# Patient Record
Sex: MALE | Race: BLACK OR AFRICAN AMERICAN | NOT HISPANIC OR LATINO | Employment: OTHER | URBAN - METROPOLITAN AREA
[De-identification: names, ages, dates, MRNs, and addresses within clinical notes are randomized per-mention and may not be internally consistent; named-entity substitution may affect disease eponyms.]

---

## 2018-08-31 LAB
BUN SERPL-MCNC: 9 MG/DL (ref 8–20)
CALCIUM SERPL-MCNC: 8.3 MG/DL (ref 7.7–10.4)
CHLORIDE: 99 MMOL/L (ref 98–110)
CO2 SERPL-SCNC: 26.8 MMOL/L (ref 22.8–31.6)
CREATININE: 0.87 MG/DL (ref 0.6–1.4)
GLUCOSE: 108 MG/DL (ref 70–99)
INR PPP: 1.6
POTASSIUM SERPL-SCNC: 3.5 MMOL/L (ref 3.5–5)
PROTHROMBIN TIME: 18.9 SEC (ref 11.3–15.2)
SODIUM: 134 MMOL/L (ref 134–144)

## 2018-09-02 LAB
BASOPHILS NFR BLD: 0 K/UL (ref 0–0.2)
BASOPHILS NFR BLD: 0.4 %
BUN SERPL-MCNC: 8 MG/DL (ref 8–20)
CALCIUM SERPL-MCNC: 8.5 MG/DL (ref 7.7–10.4)
CHLORIDE: 102 MMOL/L (ref 98–110)
CO2 SERPL-SCNC: 25.3 MMOL/L (ref 22.8–31.6)
CREATININE: 0.91 MG/DL (ref 0.6–1.4)
EOSINOPHIL NFR BLD: 0.1 K/UL (ref 0–0.7)
EOSINOPHIL NFR BLD: 1.6 %
ERYTHROCYTE [DISTWIDTH] IN BLOOD BY AUTOMATED COUNT: 14.1 % (ref 11.7–14.9)
GLUCOSE: 99 MG/DL (ref 70–99)
GRAN #: 4.6 K/UL (ref 1.4–6.5)
GRAN%: 60.5 %
HCT VFR BLD AUTO: 29.9 % (ref 39–55)
HGB BLD-MCNC: 10.3 G/DL (ref 14–16)
IMMATURE GRANS (ABS): 0 K/UL (ref 0–1)
IMMATURE GRANULOCYTES: 0.1 %
INR PPP: 1.8
LYMPH #: 2 K/UL (ref 1.2–3.4)
LYMPH%: 25.5 %
MCH RBC QN AUTO: 37.9 PG (ref 25–35)
MCHC RBC AUTO-ENTMCNC: 34.4 G/DL (ref 31–36)
MCV RBC AUTO: 109.9 FL (ref 80–100)
MONO #: 0.9 K/UL (ref 0.1–0.6)
MONO%: 11.9 %
NUCLEATED RBCS: 0 %
PLATELET # BLD AUTO: 83 K/UL (ref 140–440)
PMV BLD AUTO: 11.3 FL (ref 8.8–12.7)
POTASSIUM SERPL-SCNC: 3.5 MMOL/L (ref 3.5–5)
PROTHROMBIN TIME: 20.7 SEC (ref 11.3–15.2)
RBC # BLD AUTO: 2.72 M/UL (ref 4.3–5.9)
SODIUM: 135 MMOL/L (ref 134–144)
WBC # BLD AUTO: 7.7 K/UL (ref 5–10)

## 2019-05-11 ENCOUNTER — HOSPITAL ENCOUNTER (INPATIENT)
Facility: OTHER | Age: 44
LOS: 2 days | Discharge: HOME | DRG: 682 | End: 2019-05-13
Attending: EMERGENCY MEDICINE | Admitting: HOSPITALIST
Payer: MEDICAID

## 2019-05-11 DIAGNOSIS — N17.9 AKI (ACUTE KIDNEY INJURY): Primary | ICD-10-CM

## 2019-05-11 DIAGNOSIS — R53.1 WEAKNESS: ICD-10-CM

## 2019-05-11 DIAGNOSIS — K70.30 ALCOHOLIC CIRRHOSIS OF LIVER WITHOUT ASCITES: ICD-10-CM

## 2019-05-11 DIAGNOSIS — S72.002A CLOSED FRACTURE OF LEFT HIP, INITIAL ENCOUNTER: ICD-10-CM

## 2019-05-11 PROBLEM — D64.9 ANEMIA: Status: ACTIVE | Noted: 2019-05-11

## 2019-05-11 PROBLEM — E87.6 HYPOKALEMIA: Status: ACTIVE | Noted: 2019-05-11

## 2019-05-11 PROBLEM — N17.0 ACUTE RENAL FAILURE WITH TUBULAR NECROSIS: Status: ACTIVE | Noted: 2019-05-11

## 2019-05-11 PROBLEM — M87.00 AVN (AVASCULAR NECROSIS OF BONE): Status: ACTIVE | Noted: 2019-05-11

## 2019-05-11 PROBLEM — Z86.711 HISTORY OF PULMONARY EMBOLUS (PE): Status: ACTIVE | Noted: 2019-05-11

## 2019-05-11 PROBLEM — I85.00 ESOPHAGEAL VARICES WITHOUT BLEEDING: Status: ACTIVE | Noted: 2019-05-11

## 2019-05-11 PROBLEM — D69.6 THROMBOCYTOPENIA: Status: ACTIVE | Noted: 2019-05-11

## 2019-05-11 PROBLEM — D68.9 COAGULOPATHY: Status: ACTIVE | Noted: 2019-05-11

## 2019-05-11 LAB
ALBUMIN SERPL BCP-MCNC: 3.3 G/DL (ref 3.5–5.2)
ALBUMIN SERPL BCP-MCNC: 3.4 G/DL (ref 3.5–5.2)
ALP SERPL-CCNC: 78 U/L (ref 55–135)
ALP SERPL-CCNC: 86 U/L (ref 55–135)
ALT SERPL W/O P-5'-P-CCNC: 22 U/L (ref 10–44)
ALT SERPL W/O P-5'-P-CCNC: 24 U/L (ref 10–44)
AMMONIA PLAS-SCNC: 46 UMOL/L (ref 10–50)
AMPHET+METHAMPHET UR QL: NEGATIVE
ANION GAP SERPL CALC-SCNC: 11 MMOL/L (ref 8–16)
ANION GAP SERPL CALC-SCNC: 15 MMOL/L (ref 8–16)
APTT BLDCRRT: 31.7 SEC (ref 21–32)
AST SERPL-CCNC: 39 U/L (ref 10–40)
AST SERPL-CCNC: 42 U/L (ref 10–40)
BACTERIA #/AREA URNS HPF: ABNORMAL /HPF
BARBITURATES UR QL SCN>200 NG/ML: NEGATIVE
BASOPHILS # BLD AUTO: 0.02 K/UL (ref 0–0.2)
BASOPHILS # BLD AUTO: 0.03 K/UL (ref 0–0.2)
BASOPHILS NFR BLD: 0.2 % (ref 0–1.9)
BASOPHILS NFR BLD: 0.3 % (ref 0–1.9)
BENZODIAZ UR QL SCN>200 NG/ML: NEGATIVE
BILIRUB SERPL-MCNC: 2.7 MG/DL (ref 0.1–1)
BILIRUB SERPL-MCNC: 2.9 MG/DL (ref 0.1–1)
BILIRUB UR QL STRIP: NEGATIVE
BNP SERPL-MCNC: 13 PG/ML (ref 0–99)
BUN SERPL-MCNC: 35 MG/DL (ref 6–20)
BUN SERPL-MCNC: 38 MG/DL (ref 6–20)
BZE UR QL SCN: NEGATIVE
CALCIUM SERPL-MCNC: 8.4 MG/DL (ref 8.7–10.5)
CALCIUM SERPL-MCNC: 8.6 MG/DL (ref 8.7–10.5)
CANNABINOIDS UR QL SCN: NORMAL
CHLORIDE SERPL-SCNC: 100 MMOL/L (ref 95–110)
CHLORIDE SERPL-SCNC: 95 MMOL/L (ref 95–110)
CK SERPL-CCNC: 236 U/L (ref 20–200)
CLARITY UR: ABNORMAL
CO2 SERPL-SCNC: 23 MMOL/L (ref 23–29)
CO2 SERPL-SCNC: 24 MMOL/L (ref 23–29)
COLOR UR: YELLOW
CREAT SERPL-MCNC: 5 MG/DL (ref 0.5–1.4)
CREAT SERPL-MCNC: 6.1 MG/DL (ref 0.5–1.4)
CREAT UR-MCNC: 38.7 MG/DL (ref 23–375)
CREAT UR-MCNC: 39 MG/DL (ref 23–375)
DIFFERENTIAL METHOD: ABNORMAL
DIFFERENTIAL METHOD: ABNORMAL
EOSINOPHIL # BLD AUTO: 0.1 K/UL (ref 0–0.5)
EOSINOPHIL # BLD AUTO: 0.1 K/UL (ref 0–0.5)
EOSINOPHIL NFR BLD: 0.5 % (ref 0–8)
EOSINOPHIL NFR BLD: 0.6 % (ref 0–8)
EOSINOPHIL URNS QL WRIGHT STN: NORMAL
ERYTHROCYTE [DISTWIDTH] IN BLOOD BY AUTOMATED COUNT: 14 % (ref 11.5–14.5)
ERYTHROCYTE [DISTWIDTH] IN BLOOD BY AUTOMATED COUNT: 14.3 % (ref 11.5–14.5)
ERYTHROCYTE [SEDIMENTATION RATE] IN BLOOD: 60 MM/HR (ref 0–10)
EST. GFR  (AFRICAN AMERICAN): 12 ML/MIN/1.73 M^2
EST. GFR  (AFRICAN AMERICAN): 15 ML/MIN/1.73 M^2
EST. GFR  (NON AFRICAN AMERICAN): 10 ML/MIN/1.73 M^2
EST. GFR  (NON AFRICAN AMERICAN): 13 ML/MIN/1.73 M^2
ETHANOL UR-MCNC: <10 MG/DL
GLUCOSE SERPL-MCNC: 118 MG/DL (ref 70–110)
GLUCOSE SERPL-MCNC: 94 MG/DL (ref 70–110)
GLUCOSE UR QL STRIP: NEGATIVE
HCT VFR BLD AUTO: 28 % (ref 40–54)
HCT VFR BLD AUTO: 29.3 % (ref 40–54)
HGB BLD-MCNC: 9.3 G/DL (ref 14–18)
HGB BLD-MCNC: 9.7 G/DL (ref 14–18)
HGB UR QL STRIP: ABNORMAL
HYALINE CASTS #/AREA URNS LPF: 5 /LPF
INR PPP: 1.3 (ref 0.8–1.2)
KETONES UR QL STRIP: NEGATIVE
LACTATE SERPL-SCNC: 2.3 MMOL/L (ref 0.5–2.2)
LACTATE SERPL-SCNC: 3.2 MMOL/L (ref 0.5–2.2)
LEUKOCYTE ESTERASE UR QL STRIP: NEGATIVE
LIPASE SERPL-CCNC: 124 U/L (ref 4–60)
LYMPHOCYTES # BLD AUTO: 1.9 K/UL (ref 1–4.8)
LYMPHOCYTES # BLD AUTO: 2.7 K/UL (ref 1–4.8)
LYMPHOCYTES NFR BLD: 21.4 % (ref 18–48)
LYMPHOCYTES NFR BLD: 28.8 % (ref 18–48)
MAGNESIUM SERPL-MCNC: 2 MG/DL (ref 1.6–2.6)
MAGNESIUM SERPL-MCNC: 2.3 MG/DL (ref 1.6–2.6)
MCH RBC QN AUTO: 32.9 PG (ref 27–31)
MCH RBC QN AUTO: 33.1 PG (ref 27–31)
MCHC RBC AUTO-ENTMCNC: 33.1 G/DL (ref 32–36)
MCHC RBC AUTO-ENTMCNC: 33.2 G/DL (ref 32–36)
MCV RBC AUTO: 100 FL (ref 82–98)
MCV RBC AUTO: 99 FL (ref 82–98)
METHADONE UR QL SCN>300 NG/ML: NEGATIVE
MICROSCOPIC COMMENT: ABNORMAL
MONOCYTES # BLD AUTO: 0.9 K/UL (ref 0.3–1)
MONOCYTES # BLD AUTO: 1.1 K/UL (ref 0.3–1)
MONOCYTES NFR BLD: 12.2 % (ref 4–15)
MONOCYTES NFR BLD: 9.7 % (ref 4–15)
NEUTROPHILS # BLD AUTO: 5.6 K/UL (ref 1.8–7.7)
NEUTROPHILS # BLD AUTO: 5.8 K/UL (ref 1.8–7.7)
NEUTROPHILS NFR BLD: 60.5 % (ref 38–73)
NEUTROPHILS NFR BLD: 65.4 % (ref 38–73)
NITRITE UR QL STRIP: NEGATIVE
OPIATES UR QL SCN: NEGATIVE
PCP UR QL SCN>25 NG/ML: NEGATIVE
PH UR STRIP: 6 [PH] (ref 5–8)
PHOSPHATE SERPL-MCNC: 4.6 MG/DL (ref 2.7–4.5)
PHOSPHATE SERPL-MCNC: 5.4 MG/DL (ref 2.7–4.5)
PLATELET # BLD AUTO: 129 K/UL (ref 150–350)
PLATELET # BLD AUTO: 140 K/UL (ref 150–350)
PMV BLD AUTO: 10.2 FL (ref 9.2–12.9)
PMV BLD AUTO: 10.8 FL (ref 9.2–12.9)
POTASSIUM SERPL-SCNC: 3.3 MMOL/L (ref 3.5–5.1)
POTASSIUM SERPL-SCNC: 3.3 MMOL/L (ref 3.5–5.1)
PROCALCITONIN SERPL IA-MCNC: 0.67 NG/ML
PROT SERPL-MCNC: 8.2 G/DL (ref 6–8.4)
PROT SERPL-MCNC: 8.2 G/DL (ref 6–8.4)
PROT UR QL STRIP: ABNORMAL
PROT UR-MCNC: 8 MG/DL (ref 0–15)
PROT/CREAT UR: 0.21 MG/G{CREAT} (ref 0–0.2)
PROTHROMBIN TIME: 14.5 SEC (ref 9–12.5)
RBC # BLD AUTO: 2.81 M/UL (ref 4.6–6.2)
RBC # BLD AUTO: 2.95 M/UL (ref 4.6–6.2)
RBC #/AREA URNS HPF: 2 /HPF (ref 0–4)
SODIUM SERPL-SCNC: 133 MMOL/L (ref 136–145)
SODIUM SERPL-SCNC: 135 MMOL/L (ref 136–145)
SODIUM UR-SCNC: 51 MMOL/L (ref 20–250)
SP GR UR STRIP: <=1.005 (ref 1–1.03)
SQUAMOUS #/AREA URNS HPF: 1 /HPF
TOXICOLOGY INFORMATION: NORMAL
TROPONIN I SERPL DL<=0.01 NG/ML-MCNC: 0.06 NG/ML (ref 0–0.03)
URATE UR-MCNC: <5 MG/DL
URN SPEC COLLECT METH UR: ABNORMAL
UROBILINOGEN UR STRIP-ACNC: 1 EU/DL
WBC # BLD AUTO: 8.79 K/UL (ref 3.9–12.7)
WBC # BLD AUTO: 9.34 K/UL (ref 3.9–12.7)
WBC #/AREA URNS HPF: 3 /HPF (ref 0–5)

## 2019-05-11 PROCEDURE — 99223 PR INITIAL HOSPITAL CARE,LEVL III: ICD-10-PCS | Mod: ,,, | Performed by: HOSPITALIST

## 2019-05-11 PROCEDURE — 83605 ASSAY OF LACTIC ACID: CPT | Mod: 91

## 2019-05-11 PROCEDURE — 96360 HYDRATION IV INFUSION INIT: CPT

## 2019-05-11 PROCEDURE — 84560 ASSAY OF URINE/URIC ACID: CPT

## 2019-05-11 PROCEDURE — 81000 URINALYSIS NONAUTO W/SCOPE: CPT

## 2019-05-11 PROCEDURE — 25000003 PHARM REV CODE 250: Performed by: EMERGENCY MEDICINE

## 2019-05-11 PROCEDURE — 85610 PROTHROMBIN TIME: CPT

## 2019-05-11 PROCEDURE — 84145 PROCALCITONIN (PCT): CPT

## 2019-05-11 PROCEDURE — 85025 COMPLETE CBC W/AUTO DIFF WBC: CPT

## 2019-05-11 PROCEDURE — 80053 COMPREHEN METABOLIC PANEL: CPT | Mod: 91

## 2019-05-11 PROCEDURE — 80074 ACUTE HEPATITIS PANEL: CPT

## 2019-05-11 PROCEDURE — 87205 SMEAR GRAM STAIN: CPT

## 2019-05-11 PROCEDURE — 82550 ASSAY OF CK (CPK): CPT

## 2019-05-11 PROCEDURE — 36415 COLL VENOUS BLD VENIPUNCTURE: CPT

## 2019-05-11 PROCEDURE — 80307 DRUG TEST PRSMV CHEM ANLYZR: CPT

## 2019-05-11 PROCEDURE — 99223 1ST HOSP IP/OBS HIGH 75: CPT | Mod: ,,, | Performed by: HOSPITALIST

## 2019-05-11 PROCEDURE — 83735 ASSAY OF MAGNESIUM: CPT | Mod: 91

## 2019-05-11 PROCEDURE — 25000003 PHARM REV CODE 250: Performed by: HOSPITALIST

## 2019-05-11 PROCEDURE — 82140 ASSAY OF AMMONIA: CPT

## 2019-05-11 PROCEDURE — 11000001 HC ACUTE MED/SURG PRIVATE ROOM

## 2019-05-11 PROCEDURE — 83880 ASSAY OF NATRIURETIC PEPTIDE: CPT

## 2019-05-11 PROCEDURE — 83690 ASSAY OF LIPASE: CPT

## 2019-05-11 PROCEDURE — 84100 ASSAY OF PHOSPHORUS: CPT

## 2019-05-11 PROCEDURE — 85730 THROMBOPLASTIN TIME PARTIAL: CPT

## 2019-05-11 PROCEDURE — 84484 ASSAY OF TROPONIN QUANT: CPT

## 2019-05-11 PROCEDURE — 85651 RBC SED RATE NONAUTOMATED: CPT

## 2019-05-11 PROCEDURE — 84300 ASSAY OF URINE SODIUM: CPT

## 2019-05-11 PROCEDURE — 87040 BLOOD CULTURE FOR BACTERIA: CPT

## 2019-05-11 PROCEDURE — 99291 CRITICAL CARE FIRST HOUR: CPT | Mod: 25

## 2019-05-11 PROCEDURE — 82570 ASSAY OF URINE CREATININE: CPT

## 2019-05-11 RX ORDER — GLUCAGON 1 MG
1 KIT INJECTION
Status: DISCONTINUED | OUTPATIENT
Start: 2019-05-11 | End: 2019-05-13 | Stop reason: HOSPADM

## 2019-05-11 RX ORDER — POTASSIUM CHLORIDE 20 MEQ/1
40 TABLET, EXTENDED RELEASE ORAL ONCE
Status: COMPLETED | OUTPATIENT
Start: 2019-05-11 | End: 2019-05-11

## 2019-05-11 RX ORDER — ACETAMINOPHEN 325 MG/1
650 TABLET ORAL
Status: COMPLETED | OUTPATIENT
Start: 2019-05-11 | End: 2019-05-11

## 2019-05-11 RX ORDER — RAMELTEON 8 MG/1
8 TABLET ORAL NIGHTLY PRN
Status: DISCONTINUED | OUTPATIENT
Start: 2019-05-11 | End: 2019-05-11

## 2019-05-11 RX ORDER — ONDANSETRON 8 MG/1
8 TABLET, ORALLY DISINTEGRATING ORAL EVERY 8 HOURS PRN
Status: DISCONTINUED | OUTPATIENT
Start: 2019-05-11 | End: 2019-05-12

## 2019-05-11 RX ORDER — SODIUM CHLORIDE 0.9 % (FLUSH) 0.9 %
10 SYRINGE (ML) INJECTION
Status: DISCONTINUED | OUTPATIENT
Start: 2019-05-11 | End: 2019-05-13 | Stop reason: HOSPADM

## 2019-05-11 RX ORDER — CARVEDILOL 6.25 MG/1
TABLET ORAL
Refills: 3 | Status: ON HOLD | COMMUNITY
Start: 2019-05-10 | End: 2019-05-13 | Stop reason: HOSPADM

## 2019-05-11 RX ORDER — IBUPROFEN 200 MG
16 TABLET ORAL
Status: DISCONTINUED | OUTPATIENT
Start: 2019-05-11 | End: 2019-05-13 | Stop reason: HOSPADM

## 2019-05-11 RX ORDER — SODIUM CHLORIDE 0.9 % (FLUSH) 0.9 %
5 SYRINGE (ML) INJECTION
Status: DISCONTINUED | OUTPATIENT
Start: 2019-05-11 | End: 2019-05-13 | Stop reason: HOSPADM

## 2019-05-11 RX ORDER — HEPARIN SODIUM 5000 [USP'U]/ML
5000 INJECTION, SOLUTION INTRAVENOUS; SUBCUTANEOUS EVERY 8 HOURS
Status: DISCONTINUED | OUTPATIENT
Start: 2019-05-11 | End: 2019-05-11

## 2019-05-11 RX ORDER — IBUPROFEN 200 MG
24 TABLET ORAL
Status: DISCONTINUED | OUTPATIENT
Start: 2019-05-11 | End: 2019-05-13 | Stop reason: HOSPADM

## 2019-05-11 RX ORDER — AMOXICILLIN 250 MG
1 CAPSULE ORAL 2 TIMES DAILY PRN
Status: DISCONTINUED | OUTPATIENT
Start: 2019-05-11 | End: 2019-05-13 | Stop reason: HOSPADM

## 2019-05-11 RX ORDER — SODIUM CHLORIDE, SODIUM LACTATE, POTASSIUM CHLORIDE, CALCIUM CHLORIDE 600; 310; 30; 20 MG/100ML; MG/100ML; MG/100ML; MG/100ML
INJECTION, SOLUTION INTRAVENOUS CONTINUOUS
Status: DISCONTINUED | OUTPATIENT
Start: 2019-05-11 | End: 2019-05-12

## 2019-05-11 RX ORDER — SODIUM CHLORIDE, SODIUM LACTATE, POTASSIUM CHLORIDE, CALCIUM CHLORIDE 600; 310; 30; 20 MG/100ML; MG/100ML; MG/100ML; MG/100ML
1000 INJECTION, SOLUTION INTRAVENOUS
Status: COMPLETED | OUTPATIENT
Start: 2019-05-11 | End: 2019-05-11

## 2019-05-11 RX ORDER — ACETAMINOPHEN 325 MG/1
650 TABLET ORAL EVERY 8 HOURS PRN
Status: DISCONTINUED | OUTPATIENT
Start: 2019-05-11 | End: 2019-05-13 | Stop reason: HOSPADM

## 2019-05-11 RX ORDER — POLYETHYLENE GLYCOL 3350 17 G/17G
17 POWDER, FOR SOLUTION ORAL DAILY PRN
Status: DISCONTINUED | OUTPATIENT
Start: 2019-05-11 | End: 2019-05-13 | Stop reason: HOSPADM

## 2019-05-11 RX ORDER — METHOCARBAMOL 500 MG/1
TABLET, FILM COATED ORAL
Refills: 0 | Status: ON HOLD | COMMUNITY
Start: 2019-05-06 | End: 2019-05-13 | Stop reason: HOSPADM

## 2019-05-11 RX ADMIN — ONDANSETRON 8 MG: 8 TABLET, ORALLY DISINTEGRATING ORAL at 11:05

## 2019-05-11 RX ADMIN — POTASSIUM CHLORIDE 40 MEQ: 1500 TABLET, EXTENDED RELEASE ORAL at 04:05

## 2019-05-11 RX ADMIN — SODIUM CHLORIDE 1721 ML: 0.9 INJECTION, SOLUTION INTRAVENOUS at 02:05

## 2019-05-11 RX ADMIN — SODIUM CHLORIDE, SODIUM LACTATE, POTASSIUM CHLORIDE, AND CALCIUM CHLORIDE: .6; .31; .03; .02 INJECTION, SOLUTION INTRAVENOUS at 08:05

## 2019-05-11 RX ADMIN — SODIUM CHLORIDE, SODIUM LACTATE, POTASSIUM CHLORIDE, AND CALCIUM CHLORIDE 1000 ML: .6; .31; .03; .02 INJECTION, SOLUTION INTRAVENOUS at 04:05

## 2019-05-11 RX ADMIN — ACETAMINOPHEN 650 MG: 325 TABLET, FILM COATED ORAL at 03:05

## 2019-05-11 NOTE — HPI
Mr. Irvin Diaz is a 44yo male with alcoholic cirrhosis, complicated by esophageal varices s/p banding for hematemesis, thrombocytopenia, and coagulopathy, and history of pulmonary embolism (diagnosed Oct 2018, not on anticoagulation because of high risk for bleeding), who presents to the emergency department after he had 2 syncopal episodes at work.  He mentions that he started to feel poorly on 5/8 with some nausea without hematemesis.  He underwent an EGD 5/9 to evaluate his varices, which he was told were stable.  Since then, he has continued to feel nauseous with weakness and decreased p.o. intake.  He went to work the day of admission, and had a syncopal episode, and then had another.  He denies any alcohol intake the last few weeks.  He denies any chest pain or shortness of breath or LE swelling.  He claims to be compliant all of his medications.   He claims that he is not on any anticoagulation because of his varices with hematemesis.  Of note, he recently had a fall with his dog, landing on his left hip.  Since then, he has been ambulating with a limp.  He was told at OSH that he had a fracture.    In the emergency department, he was found to have a blood pressure of 93/53 on arrival, but was reported by EMS to have a systolic blood pressure in the 60s.  Labs were notable for a creatinine of 6.1 up from a baseline of 1.  He was given IV fluids with improvement in his creatinine down to 5 while in the emergency department.  Nephrology was consulted.  He was admitted to Hospital Medicine for further management.

## 2019-05-11 NOTE — ASSESSMENT & PLAN NOTE
· Diagnosed with PE Oct 2018 and was started on Warfarin for increased bleeding risk because of liver disease and varices  · Hasn't been on treatment  · Check LE US, VQ scan  · Would benefit from repeat CTA outpatient to monitor PE progression  · Likely to need IVC filter since not a good candidate for anticoagulation  · Will need to discuss risk/benefit with patient but will start Heparin TID for prophylaxis for now until further evaluated.  If positive, will need Heparin gtt in the meantime

## 2019-05-11 NOTE — ASSESSMENT & PLAN NOTE
· Hgb 9.7, around baseline  · Likely to drop with IVF  · Monitor  · Check type and screen, iron studies

## 2019-05-11 NOTE — ASSESSMENT & PLAN NOTE
MELD-Na score: 27 at 5/11/2019  3:41 PM  MELD score: 26 at 5/11/2019  3:41 PM  Calculated from:  Serum Creatinine: 5.0 mg/dL (Rounded to 4 mg/dL) at 5/11/2019  3:41 PM  Serum Sodium: 135 mmol/L at 5/11/2019  3:41 PM  Total Bilirubin: 2.7 mg/dL at 5/11/2019  3:41 PM  INR(ratio): 1.3 at 5/11/2019  1:46 PM  Age: 43 years  · Worsening MELD 2/2 renal failure  · IVF as above  · Check Hep panel  · Will need referral to Hepatology outpatient

## 2019-05-11 NOTE — ED TRIAGE NOTES
Pt presents to ED via EMS with c/o hypotension, chronic hip pain and alleged hallucinations per EMS. Pt reportedly 60s/30s on scene and describing visual hallucinations, received 1 L NS from EMS. Pt reports compliance with BP medications last taken this morning, but pt is poor historian of medications and is unable to give names or indications of medications. Pt semi-Fowlers in stretcher, drowsy but easily arousable, AAOx4, cooperative, responding appropriately to questions, speaking in full sentences, respirations even and unlabored, skin warm and dry with color appropriate for ethnicity.

## 2019-05-11 NOTE — H&P
[facility] Hospital Medicine  History & Physical    Patient Name: Irvin Diaz  MRN: [id number]  Admission Date: 5/11/2019  Attending Physician: Mila Kinney MD   Primary Care Provider: Primary Doctor No         Patient information was obtained from patient and ER records.     Subjective:     Principal Problem:Acute renal failure with tubular necrosis    Chief Complaint:   Chief Complaint   Patient presents with    Hypotension     Pt with hypotension (initial BP 60/30's), hip pain and alledged hallucinations (pt denies), denies trauma, received 1l NS per EMS        HPI: Mr. Irvin Diaz is a 42yo male with alcoholic cirrhosis, complicated by esophageal varices s/p banding for hematemesis, thrombocytopenia, and coagulopathy, and history of pulmonary embolism (diagnosed Oct 2018, not on anticoagulation because of high risk for bleeding), who presents to the emergency department after he had 2 syncopal episodes at work.  He mentions that he started to feel poorly on 5/8 with some nausea without hematemesis.  He underwent an EGD 5/9 to evaluate his varices, which he was told were stable.  Since then, he has continued to feel nauseous with weakness and decreased p.o. intake.  He went to work the day of admission, and had a syncopal episode, and then had another.  He denies any alcohol intake the last few weeks.  He denies any chest pain or shortness of breath or LE swelling.  He claims to be compliant all of his medications.   He claims that he is not on any anticoagulation because of his varices with hematemesis.  Of note, he recently had a fall with his dog, landing on his left hip.  Since then, he has been ambulating with a limp.  He was told at OSH that he had a fracture.    In the emergency department, he was found to have a blood pressure of 93/53 on arrival, but was reported by EMS to have a systolic blood pressure in the 60s.  Labs were notable for a creatinine of 6.1 up from a  baseline of 1.  He was given IV fluids with improvement in his creatinine down to 5 while in the emergency department.  Nephrology was consulted.  He was admitted to Hospital Medicine for further management.    Past Medical History:   Diagnosis Date    Alcoholic cirrhosis of liver     Hypertension        Past Surgical History:   Procedure Laterality Date    COLON SURGERY      HERNIA REPAIR         Review of patient's allergies indicates:  No Known Allergies    No current facility-administered medications on file prior to encounter.      Current Outpatient Medications on File Prior to Encounter   Medication Sig    carvedilol (COREG) 6.25 MG tablet TK 1 T PO  BID WITH MEALS    lisinopril-hydrochlorothiazide (PRINZIDE,ZESTORETIC) 20-25 mg Tab Take 1 tablet by mouth once daily.    cloNIDine (CATAPRES) 0.1 MG tablet Take 1 tablet (0.1 mg total) by mouth 2 (two) times daily.    methocarbamol (ROBAXIN) 500 MG Tab TAKE 1 TABLET BY MOUTH THREE TIMES A DAY AS NEEDED FOR MUSCLE SPASMS     Family History     None        Tobacco Use    Smoking status: Never Smoker   Substance and Sexual Activity    Alcohol use: Yes     Comment: freq    Drug use: Yes     Types: Marijuana    Sexual activity: Not on file     Comment: occ     Review of Systems   Constitutional: Positive for fatigue. Negative for chills and fever.   HENT: Negative for sore throat and trouble swallowing.    Eyes: Negative for photophobia and visual disturbance.   Respiratory: Negative for cough and shortness of breath.    Cardiovascular: Negative for chest pain, palpitations and leg swelling.   Gastrointestinal: Positive for nausea. Negative for abdominal pain, constipation, diarrhea and vomiting.   Endocrine: Negative for cold intolerance and heat intolerance.   Genitourinary: Negative for dysuria and frequency.   Musculoskeletal: Negative for arthralgias and myalgias.   Skin: Negative for rash and wound.   Neurological: Positive for syncope, weakness and  light-headedness. Negative for dizziness.   Psychiatric/Behavioral: Negative for confusion and hallucinations.   All other systems reviewed and are negative.    Objective:     Vital Signs (Most Recent):  Temp: 98.1 °F (36.7 °C) (05/11/19 1442)  Pulse: 84 (05/11/19 1652)  Resp: 16 (05/11/19 1652)  BP: 128/63 (05/11/19 1651)  SpO2: 100 % (05/11/19 1652) Vital Signs (24h Range):  Temp:  [98.1 °F (36.7 °C)-98.6 °F (37 °C)] 98.1 °F (36.7 °C)  Pulse:  [72-84] 84  Resp:  [16-21] 16  SpO2:  [96 %-100 %] 100 %  BP: ()/(49-63) 128/63     Weight: 90.7 kg (200 lb)  Body mass index is 27.12 kg/m².    Physical Exam   Constitutional: He is oriented to person, place, and time. He appears well-developed and well-nourished.   HENT:   Head: Normocephalic and atraumatic.   Dry mucus membranes   Eyes: Pupils are equal, round, and reactive to light. EOM are normal. No scleral icterus.   Neck: Normal range of motion. Neck supple.   Cardiovascular: Normal rate and regular rhythm.   No murmur heard.  Pulmonary/Chest: Effort normal and breath sounds normal. No respiratory distress. He has no wheezes.   Abdominal: Soft. Bowel sounds are normal. He exhibits no distension. There is no tenderness.   Musculoskeletal: Normal range of motion. He exhibits no edema or tenderness.   Neurological: He is alert and oriented to person, place, and time.   Skin: Skin is warm and dry.   Psychiatric: He has a normal mood and affect. His behavior is normal.   Vitals reviewed.        CRANIAL NERVES     CN III, IV, VI   Pupils are equal, round, and reactive to light.  Extraocular motions are normal.        Significant Labs:   CBC:   Recent Labs   Lab 05/11/19  1346 05/11/19  1541   WBC 9.34 8.79   HGB 9.3* 9.7*   HCT 28.0* 29.3*   * 129*     CMP:   Recent Labs   Lab 05/11/19  1346 05/11/19  1541   * 135*   K 3.3* 3.3*   CL 95 100   CO2 23 24   * 94   BUN 38* 35*   CREATININE 6.1* 5.0*   CALCIUM 8.6* 8.4*   PROT 8.2 8.2   ALBUMIN 3.4*  3.3*   BILITOT 2.9* 2.7*   ALKPHOS 78 86   AST 42* 39   ALT 24 22   ANIONGAP 15 11   EGFRNONAA 10* 13*     Coagulation:   Recent Labs   Lab 05/11/19  1346   INR 1.3*   APTT 31.7     All pertinent labs within the past 24 hours have been reviewed.    Significant Imaging: I have reviewed and interpreted all pertinent imaging results/findings within the past 24 hours.    Assessment/Plan:     * Acute renal failure with tubular necrosis  · Creatinine 6.1 on admit, baseline around 1  · Likely pre-renal from dehydration and volume losses with intra-renal from hypotension from Lisinopril/HCTZ  · Check urine lytes and renal ultrasound  · Strict I&Os  · IVF with LR  · Avoid Nephrotoxic agents  · Nephro consulted, appreciate assistance      AVN (avascular necrosis of bone)  · Reports he had a fall with his dog, landing on his left hip  · Has been ambulating with a limp  · OSH CT scan with reported fracture (unable to be seen on Epic or Care Everywhere) but XR here with AVN  · Check MRI to further evaluate  · PT eval for DME  · Might need Ortho      Esophageal varices without bleeding  · Serial EGD on 5/9 with no acute issues  · Monitor for bleeding      Anemia  · Hgb 9.7, around baseline  · Likely to drop with IVF  · Monitor  · Check type and screen, iron studies      History of pulmonary embolus (PE)  · Diagnosed with PE Oct 2018 and was started on Warfarin for increased bleeding risk because of liver disease and varices  · Hasn't been on treatment  · Check LE US, VQ scan  · Would benefit from repeat CTA outpatient to monitor PE progression  · Likely to need IVC filter since not a good candidate for anticoagulation  · Will need to discuss risk/benefit with patient but will start Heparin TID for prophylaxis for now until further evaluated.  If positive, will need Heparin gtt in the meantime       Hypokalemia  · K 3.3 likely diuretic induced  · Replace      Thrombocytopenia  · Plt chronically low 2/2 liver disease  · Monitor for  bleeding      Coagulopathy  · 2/2 liver disease  · Monitor      Alcoholic cirrhosis  MELD-Na score: 27 at 5/11/2019  3:41 PM  MELD score: 26 at 5/11/2019  3:41 PM  Calculated from:  Serum Creatinine: 5.0 mg/dL (Rounded to 4 mg/dL) at 5/11/2019  3:41 PM  Serum Sodium: 135 mmol/L at 5/11/2019  3:41 PM  Total Bilirubin: 2.7 mg/dL at 5/11/2019  3:41 PM  INR(ratio): 1.3 at 5/11/2019  1:46 PM  Age: 43 years  · Worsening MELD 2/2 renal failure  · IVF as above  · Check Hep panel  · Will need referral to Hepatology outpatient        VTE Risk Mitigation (From admission, onward)        Ordered     heparin (porcine) injection 5,000 Units  Every 8 hours      05/11/19 1709     IP VTE LOW RISK PATIENT  Once      05/11/19 1625     Place sequential compression device  Until discontinued      05/11/19 1625         Dispo pending Nephro and renal function    Mila Kinney MD  Department of Hospital Medicine   [facility]

## 2019-05-11 NOTE — ASSESSMENT & PLAN NOTE
· Creatinine 6.1 on admit, baseline around 1  · Likely pre-renal from dehydration and volume losses with intra-renal from hypotension from Lisinopril/HCTZ  · Check urine lytes and renal ultrasound  · Strict I&Os  · IVF with LR  · Avoid Nephrotoxic agents  · Nephro consulted, appreciate assistance

## 2019-05-11 NOTE — CONSULTS
Consult Note  Nephrology    Consult Requested By: Jono Ludwig MD  Reason for Consult: ENA    SUBJECTIVE:     History of Present Illness:  Patient is a 43 y.o. male presents with weakness found to be severely hypotensive by EMS and upon eval in ED Cr noted to be 6.1 with baseline a few days ago .of 1.12. He unfortunately had taken his BP meds this am which include lisinopril-HCTZ, Metorprol and clonidine. Thus far he has been given  2L NS in ED with some improvement in BP and minimal urineoutput of 25cc. He was recently admitted to [facility] with Dx of PE and early cirrhosis due to ETOH.    Past Medical History:   Diagnosis Date    Alcoholic cirrhosis of liver     Hypertension      Past Surgical History:   Procedure Laterality Date    COLON SURGERY      HERNIA REPAIR       No family history on file.  Social History     Tobacco Use    Smoking status: Never Smoker   Substance Use Topics    Alcohol use: Yes     Comment: freq    Drug use: Yes     Types: Marijuana         (Not in a hospital admission)    Review of patient's allergies indicates:  No Known Allergies     Review of Systems:  Constitutional: No fever or chills, no weight changes.  Eyes: No visual changes or photophobia  HEENT: No nasal congestion or sore throat  Respiratory: No cough or shorness of breath  Cardiovascular: No chest pain or palpitations  Gastrointestinal: Good appetite, no nausea or vomiting, no change in bowel habits  Genitourinary: No hematuria or dysuria  Skin: No rash or pruritis  Hematologic/lymphatic: No easy bruising, bleeding or lymphadenopathy  Musculoskeletal: No arthralgias or myalgias  Neurological: No seizures or tremors  Endocrine: No heat/cold intolerance.  No polyuria/polydipsia.  Psychiatric:  No depression or anxiety.     OBJECTIVE:     Vital Signs (Most Recent)  Temp: 98.1 °F (36.7 °C) (05/11/19 1442)  Pulse: 80 (05/11/19 1540)  Resp: 18 (05/11/19 1540)  BP: (!) 114/54 (05/11/19 1527)  SpO2: 99 % (05/11/19  1540)    Vital Signs Range (Last 24H):  Temp:  [98.1 °F (36.7 °C)-98.6 °F (37 °C)]   Pulse:  [72-82]   Resp:  [18-20]   BP: ()/(53-60)   SpO2:  [96 %-100 %]     Physical Exam:    General appearance: Well developed, well nourished  Head: Normocephalic, atraumatic  Eyes:  Conjunctivae nl. Sclera anicteric. PERRL.  HEENT: Lips, mucosa, and tongue normal; teeth and gums normal and oropharynx clear.  Neck: Supple, trachea midline, thyroid not enlarged,   Lungs: Clear to auscultation bilaterally and normal respiratory effort  Heart: Regular rate and rhythm, S1, S2 normal, no murmur, click, rub or gallop  Abdomen: Soft, non-tender non-distended; bowel sounds normal; no masses,  no organomegaly  Extremities: No cyanosis or clubbing. No edema  Pulses: 2+ and symmetric  Skin: Skin color, texture, turgor normal. No rashes or lesions  Lymph nodes: Cervical, supraclavicular, and axillary nodes normal.  Neurologic: Normal strength and tone. No focal numbness or weakness  Psychiatric:  Alert and oriented times 3.  Affect appropriate.     Diagnostic Results:  Labs: Reviewed    ASSESSMENT/PLAN:     1. ENA/ Hypokalemia  -Baseline Cr 1.1-1.2 on 5/6/19   - Hopefully thus is pre-renal vs ATN  -Continue with volume repletion but would change to LR which has some K+.   -Gentle K replacement.  -Place boyce and monitor closely.  -Will send remainder of w/u and check Renal US as well.    2. Hypotension  -unfortunately he took his home meds this am.  -IVFs  -mild elevation in procalcitonin and Lactate, but I suspect this may be due to degree of volume depletion would repeat both after some volume.    3. Anemia with Hx of GIB in past/ Thrombocytopenia/ Coagulopathy  -Hgb a few days ago was 10.6 and now 9.3  -I suspect this will decline further after volume given as well.  -Send w/u.  -Plts lower than recent baseline, monitor.  -Uremia will decrease plt function as well.  - INR currently 1.3    4. ETOH Cirrhosis  -pending w/u at [facility]    5.  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 Hx of PE  -Has not taken Coumadin as Rx'd for 6 mos.  -Can consider rescan with V/C and dopplers to assess need for full anti-coagulation vs prophylaxis.  -Cannot have IV contrast.    Case discussed with Dr. Gauthier and

## 2019-05-11 NOTE — SUBJECTIVE & OBJECTIVE
Past Medical History:   Diagnosis Date    Alcoholic cirrhosis of liver     Hypertension        Past Surgical History:   Procedure Laterality Date    COLON SURGERY      HERNIA REPAIR         Review of patient's allergies indicates:  No Known Allergies    No current facility-administered medications on file prior to encounter.      Current Outpatient Medications on File Prior to Encounter   Medication Sig    carvedilol (COREG) 6.25 MG tablet TK 1 T PO  BID WITH MEALS    lisinopril-hydrochlorothiazide (PRINZIDE,ZESTORETIC) 20-25 mg Tab Take 1 tablet by mouth once daily.    cloNIDine (CATAPRES) 0.1 MG tablet Take 1 tablet (0.1 mg total) by mouth 2 (two) times daily.    methocarbamol (ROBAXIN) 500 MG Tab TAKE 1 TABLET BY MOUTH THREE TIMES A DAY AS NEEDED FOR MUSCLE SPASMS     Family History     None        Tobacco Use    Smoking status: Never Smoker   Substance and Sexual Activity    Alcohol use: Yes     Comment: mary    Drug use: Yes     Types: Marijuana    Sexual activity: Not on file     Comment: occ     Review of Systems   Constitutional: Positive for fatigue. Negative for chills and fever.   HENT: Negative for sore throat and trouble swallowing.    Eyes: Negative for photophobia and visual disturbance.   Respiratory: Negative for cough and shortness of breath.    Cardiovascular: Negative for chest pain, palpitations and leg swelling.   Gastrointestinal: Positive for nausea. Negative for abdominal pain, constipation, diarrhea and vomiting.   Endocrine: Negative for cold intolerance and heat intolerance.   Genitourinary: Negative for dysuria and frequency.   Musculoskeletal: Negative for arthralgias and myalgias.   Skin: Negative for rash and wound.   Neurological: Positive for syncope, weakness and light-headedness. Negative for dizziness.   Psychiatric/Behavioral: Negative for confusion and hallucinations.   All other systems reviewed and are negative.    Objective:     Vital Signs (Most Recent):  Temp:  98.1 °F (36.7 °C) (05/11/19 1442)  Pulse: 84 (05/11/19 1652)  Resp: 16 (05/11/19 1652)  BP: 128/63 (05/11/19 1651)  SpO2: 100 % (05/11/19 1652) Vital Signs (24h Range):  Temp:  [98.1 °F (36.7 °C)-98.6 °F (37 °C)] 98.1 °F (36.7 °C)  Pulse:  [72-84] 84  Resp:  [16-21] 16  SpO2:  [96 %-100 %] 100 %  BP: ()/(49-63) 128/63     Weight: 90.7 kg (200 lb)  Body mass index is 27.12 kg/m².    Physical Exam   Constitutional: He is oriented to person, place, and time. He appears well-developed and well-nourished.   HENT:   Head: Normocephalic and atraumatic.   Dry mucus membranes   Eyes: Pupils are equal, round, and reactive to light. EOM are normal. No scleral icterus.   Neck: Normal range of motion. Neck supple.   Cardiovascular: Normal rate and regular rhythm.   No murmur heard.  Pulmonary/Chest: Effort normal and breath sounds normal. No respiratory distress. He has no wheezes.   Abdominal: Soft. Bowel sounds are normal. He exhibits no distension. There is no tenderness.   Musculoskeletal: Normal range of motion. He exhibits no edema or tenderness.   Neurological: He is alert and oriented to person, place, and time.   Skin: Skin is warm and dry.   Psychiatric: He has a normal mood and affect. His behavior is normal.   Vitals reviewed.        CRANIAL NERVES     CN III, IV, VI   Pupils are equal, round, and reactive to light.  Extraocular motions are normal.        Significant Labs:   CBC:   Recent Labs   Lab 05/11/19  1346 05/11/19  1541   WBC 9.34 8.79   HGB 9.3* 9.7*   HCT 28.0* 29.3*   * 129*     CMP:   Recent Labs   Lab 05/11/19  1346 05/11/19  1541   * 135*   K 3.3* 3.3*   CL 95 100   CO2 23 24   * 94   BUN 38* 35*   CREATININE 6.1* 5.0*   CALCIUM 8.6* 8.4*   PROT 8.2 8.2   ALBUMIN 3.4* 3.3*   BILITOT 2.9* 2.7*   ALKPHOS 78 86   AST 42* 39   ALT 24 22   ANIONGAP 15 11   EGFRNONAA 10* 13*     Coagulation:   Recent Labs   Lab 05/11/19  1346   INR 1.3*   APTT 31.7     All pertinent labs within the  past 24 hours have been reviewed.    Significant Imaging: I have reviewed and interpreted all pertinent imaging results/findings within the past 24 hours.

## 2019-05-11 NOTE — ED PROVIDER NOTES
Encounter Date: 5/11/2019    SCRIBE #1 NOTE: I, Tiffany Slade, am scribing for, and in the presence of,  Dr. Ludwig. I have scribed the entire note.       History     Chief Complaint   Patient presents with    Hypotension     Pt with hypotension (initial BP 60/30's), hip pain and alledged hallucinations (pt denies), denies trauma, received 1l NS per EMS     This is a 43 y.o. male who has a past medical history of HTN, alcoholic cirrhosis, presents to the Emergency Department via EMS with generalized weakness that began this morning at 10:00 AM. He reports associated symptoms of neck pain and HA that waxes and wanes in intensity and is located towards the top of his head. Patient reports that he has never had symptoms like this before. He says he has been in good health recently and was at normal baseline last night. He denies any blood in stool, cough, fever, abdominal pain, diarrhea, fever or drinking/smoking. He says he is not taking medication at this time HTN. Patient also complains of left hip pain x 6 months that patient believes came from a minor fall that happened when patient was walking his dog. Patient says that pain gradually worsened but has improved over the last x 2 weeks. He is able to walk but with a limp. His PCP ordered X-rays last week and he was initially told he had arthritis but later told he may have a fracture.     The history is provided by the patient.     Review of patient's allergies indicates:  No Known Allergies  Past Medical History:   Diagnosis Date    Alcoholic cirrhosis of liver     Hypertension      Past Surgical History:   Procedure Laterality Date    COLON SURGERY      HERNIA REPAIR       No family history on file.  Social History     Tobacco Use    Smoking status: Never Smoker   Substance Use Topics    Alcohol use: Yes     Comment: freq    Drug use: Yes     Types: Marijuana     Review of Systems   Constitutional: Negative for appetite change, chills and fever.    HENT: Negative for rhinorrhea, sore throat and trouble swallowing.    Eyes: Negative for visual disturbance.   Respiratory: Negative for cough and shortness of breath.    Cardiovascular: Negative for chest pain.   Gastrointestinal: Negative for abdominal pain, blood in stool, diarrhea and vomiting.   Genitourinary: Negative for dysuria and hematuria.   Musculoskeletal: Positive for neck pain. Negative for back pain.   Skin: Negative for rash.   Neurological: Positive for weakness and headaches. Negative for numbness.   Hematological: Negative for adenopathy.       Physical Exam     Initial Vitals   BP Pulse Resp Temp SpO2   05/11/19 1323 05/11/19 1323 05/11/19 1409 05/11/19 1325 05/11/19 1323   (!) 93/53 72 20 98.6 °F (37 °C) 96 %      MAP       --                Physical Exam    Constitutional: He appears well-nourished. He is not diaphoretic.  Non-toxic appearance. No distress.   HENT:   Head: Normocephalic and atraumatic.   Mouth/Throat: Mucous membranes are dry. No oropharyngeal exudate.   Eyes: Conjunctivae and EOM are normal. Pupils are equal, round, and reactive to light. Right eye exhibits no nystagmus. Left eye exhibits no nystagmus.   Neck: Normal range of motion. Neck supple.   No meningeal signs   Cardiovascular: Normal rate, regular rhythm, S1 normal, S2 normal, normal heart sounds and intact distal pulses. Exam reveals no gallop.    No murmur heard.  Pulmonary/Chest: Breath sounds normal. He has no wheezes. He has no rales.   Abdominal: Soft. He exhibits no distension. There is no tenderness.   Musculoskeletal: He exhibits tenderness. He exhibits no edema.   Mild left hip tenderness with full ROM.   Neurological: He is alert and oriented to person, place, and time. He has normal strength. No cranial nerve deficit. GCS score is 15. GCS eye subscore is 4. GCS verbal subscore is 5. GCS motor subscore is 6.   Skin: Skin is warm and dry. No rash noted.   Psychiatric: He has a normal mood and affect. His  behavior is normal.         ED Course   Procedures  Labs Reviewed   CBC W/ AUTO DIFFERENTIAL - Abnormal; Notable for the following components:       Result Value    RBC 2.81 (*)     Hemoglobin 9.3 (*)     Hematocrit 28.0 (*)     Mean Corpuscular Volume 100 (*)     Mean Corpuscular Hemoglobin 33.1 (*)     Platelets 140 (*)     All other components within normal limits   COMPREHENSIVE METABOLIC PANEL - Abnormal; Notable for the following components:    Sodium 133 (*)     Potassium 3.3 (*)     Glucose 118 (*)     BUN, Bld 38 (*)     Creatinine 6.1 (*)     Calcium 8.6 (*)     Albumin 3.4 (*)     Total Bilirubin 2.9 (*)     AST 42 (*)     eGFR if  12 (*)     eGFR if non  10 (*)     All other components within normal limits   LACTIC ACID, PLASMA - Abnormal; Notable for the following components:    Lactate (Lactic Acid) 3.2 (*)     All other components within normal limits   URINALYSIS, REFLEX TO URINE CULTURE - Abnormal; Notable for the following components:    Appearance, UA Hazy (*)     Specific Gravity, UA <=1.005 (*)     Protein, UA Trace (*)     Occult Blood UA 1+ (*)     All other components within normal limits    Narrative:     Preferred Collection Type->Urine, Clean Catch   PHOSPHORUS - Abnormal; Notable for the following components:    Phosphorus 5.4 (*)     All other components within normal limits   PROTIME-INR - Abnormal; Notable for the following components:    Prothrombin Time 14.5 (*)     INR 1.3 (*)     All other components within normal limits   LIPASE - Abnormal; Notable for the following components:    Lipase 124 (*)     All other components within normal limits   TROPONIN I - Abnormal; Notable for the following components:    Troponin I 0.056 (*)     All other components within normal limits   PROCALCITONIN - Abnormal; Notable for the following components:    Procalcitonin 0.67 (*)     All other components within normal limits   URINALYSIS MICROSCOPIC - Abnormal;  Notable for the following components:    Hyaline Casts, UA 5 (*)     All other components within normal limits    Narrative:     Preferred Collection Type->Urine, Clean Catch   CK - Abnormal; Notable for the following components:     (*)     All other components within normal limits   COMPREHENSIVE METABOLIC PANEL - Abnormal; Notable for the following components:    Sodium 135 (*)     Potassium 3.3 (*)     BUN, Bld 35 (*)     Creatinine 5.0 (*)     Calcium 8.4 (*)     Albumin 3.3 (*)     Total Bilirubin 2.7 (*)     eGFR if  15 (*)     eGFR if non  13 (*)     All other components within normal limits   CBC W/ AUTO DIFFERENTIAL - Abnormal; Notable for the following components:    RBC 2.95 (*)     Hemoglobin 9.7 (*)     Hematocrit 29.3 (*)     Mean Corpuscular Volume 99 (*)     Mean Corpuscular Hemoglobin 32.9 (*)     Platelets 129 (*)     Mono # 1.1 (*)     All other components within normal limits   PHOSPHORUS - Abnormal; Notable for the following components:    Phosphorus 4.6 (*)     All other components within normal limits   CULTURE, BLOOD   CULTURE, BLOOD   MAGNESIUM   APTT   B-TYPE NATRIURETIC PEPTIDE   AMMONIA   CK   MAGNESIUM   URIC ACID   IRON AND TIBC   FERRITIN   VITAMIN B12   LACTATE DEHYDROGENASE   FOLATE   IRON AND TIBC   VITAMIN B12   FOLATE   FERRITIN   PROTEIN / CREATININE RATIO, URINE   SODIUM, URINE, RANDOM   URIC ACID, URINE RANDOM   NELSON'S STAIN, URINE RANDOM   LACTIC ACID, PLASMA   HEPATITIS PANEL, ACUTE   TOXICOLOGY SCREEN, URINE, RANDOM (COMPLIANCE)          Imaging Results          X-Ray Hip 2 View Left (Final result)  Result time 05/11/19 15:52:16    Final result by Elver Figueroa MD (05/11/19 15:52:16)                 Impression:      Collapse and sclerosis of the left femoral head consistent with avascular necrosis with fracture, age indeterminate.  Consider MRI for further evaluation.      Electronically signed by: Elver Figueroa  MD  Date:    05/11/2019  Time:    15:52             Narrative:    EXAMINATION:  XR HIP 2 VIEW LEFT    CLINICAL HISTORY:  L hip pain;    TECHNIQUE:  AP view of the pelvis and frog leg lateral view of the left hip were performed.    COMPARISON:  None    FINDINGS:  There is collapse of the left femoral head with sclerotic changes suggestive of avascular necrosis.  No comparisons are available.  This may be in acute process.  Degenerative changes of the right hip.  No soft tissue abnormality.                               X-Ray Chest AP Portable (Final result)  Result time 05/11/19 14:03:52    Final result by Alpa Dial MD (05/11/19 14:03:52)                 Impression:      No acute or active disease.      Electronically signed by: Alpa Dial  Date:    05/11/2019  Time:    14:03             Narrative:    EXAMINATION:  XR CHEST AP PORTABLE    CLINICAL HISTORY:  Sepsis;    TECHNIQUE:  Single frontal view of the chest was performed.    COMPARISON:  10/06/2014    FINDINGS:  The heart is normal size unchanged.  The hilar shadows and trachea appear normal.  No pneumothorax or pleural effusion or interstitial edema or consolidation or nodule or cavitary lesion.  No abdominal free air.  No fracture.  There are overlying leads.  Modest inspiratory exam.                              X-Rays:   Independently Interpreted Readings:   Chest X-Ray: Normal heart size.  No infiltrates.  No acute abnormalities.     Medical Decision Making:   Initial Assessment:       43-year-old male with history of hypertension, alcoholic cirrhosis, PE last year no longer on anticoagulation, brought by EMS due to generalized weakness that started this morning.  Per chart review of [facility] records, patient was found to have signs of alcoholic cirrhosis when he was admitted for PE in October 2018.  He states no definite etiology for PE found, and he was on anticoagulation for 3 months then stopped.  Since then he was admitted for to GI bleeds, the  1st time attributed to NSAID use and the 2nd due to varices.  He is being followed by [facility] and has EGD upcoming to monitor varices.  No known history of ascites or other cirrhosis complications, and he no longer drinks ETOH or takes NSAIDs.  He states he has been at normal baseline recently, though in the past few days he notes decreased appetite and p.o. Intake.  He was at baseline last night, and this morning noted generalized weakness so called EMS.  EMS found him to be hypotensive with BP 60s over 30s, improved with IVF; by ED arrival initial BP 93/53.  EMS mentions some possible hallucinations on route here, but patient with normal mental status now.  No known history of hepatic encephalopathy.    Sepsis workup done given hypotension, though no fever or infectious symptoms on arrival.  He does complain of mild headache and neck pain, but no meningeal signs or neuro deficits, no sign of meningitis or CVA/ICH.  He was given 30 mL/kg IV bolus and SBP improved to 110s, still with no tachycardia or fever.  Labs concerning for ENA, with BUN 38 and CR 6.1; he had outpatient labs done all 5 days ago with normal renal function and Cr 1.1.  He has associated mild hypokalemia and low Ca and elevated Phos. The could be prerenal component causing severe ENA, or also ATN from hypotension.  He had postvoid residual of less than 200 mL, with no history of urinary retention or sign of this now.    He does have mildly elevated lactate and procalcitonin, but this could also be from volume depletion.  No other signs of sepsis such as fever, tachycardia, elevated WBC, and no clear source for any infection, no sign of UTI or pneumonia.  No sign of intra-abdominal infection or ascites.  Since no definite sepsis, will hold empiric antibiotics. He denies any known signs of GI bleed or melena; his hemoglobin now is 9.3; on labs 5 days ago it was 10.6.  Since the 9.3 now may be hemoconcentrated, concern for subtle GI bleed  contributing to hypotension and weakness.   Labs repeated after initial sepsis IVF bolus.  Hemoglobin now 9.7, arguing against GI bleed, and CR improved to 5.  Nephrology consult and will evaluate patient for further recommendations.     Patient also complains of chronic left hip pain for the past 6 months, somewhat improved than last past 2 weeks.  He had minor fall prior to onset of pain, but has been ambulatory with limp since then.  He finally had x-[facility] done at [facility] on 05/[facility]; he was initially told it was left hip arthritis, but later called and told it may be fractured.  Official radiology reading does show left femoral head fracture.  He does have left hip pain but full ROM.  X-ray repeated here does confirm left femoral head collapse and sclerosis consistent with avascular necrosis with fracture.  Will consult Ortho while he is admitted for ENA.     Discussed with hospitalist, who agrees with admission plan and holding antibiotics for now.  Will admit to ICU for close monitoring given degree of ENA and initial hypotension.      Clinical Tests:   Lab Tests: Ordered and Reviewed  Radiological Study: Ordered and Reviewed  Medical Tests: Ordered and Reviewed            Scribe Attestation:   Scribe #1: I performed the above scribed service and the documentation accurately describes the services I performed. I attest to the accuracy of the note.    Attending Attestation:         Attending Critical Care:   Critical Care Times:   Direct Patient Care (initial evaluation, reassessments, and time considering the case)................................................................22 minutes.   Additional History from reviewing old medical records or taking additional history from the family, EMS, PCP, etc.......................7 minutes.   Ordering, Reviewing, and Interpreting Diagnostic Studies...............................................................................................................5  minutes.   Documentation..................................................................................................................................................................................5 minutes.   Consultation with other Physicians. .................................................................................................................................................5 minutes.   ==============================================================  · Total Critical Care Time - exclusive of procedural time: 44 minutes.  ==============================================================  Critical Care Condition: potentially life-threatening   Critical Care Comments: Severe ENA and hypotension, emergent evaluation and ICU admit     Physician Attestation for Scribe:  Physician Attestation Statement for Scribe #1: I, Dr. Ludwig, reviewed documentation, as scribed by Tiffany Slade in my presence, and it is both accurate and complete.                    Clinical Impression:     1. ENA (acute kidney injury)    2. Weakness    3. Closed fracture of left hip, initial encounter                               Jono Ludwig MD  05/11/19 1632       Jono Ludwig MD  05/11/19 2306

## 2019-05-11 NOTE — ED NOTES
Bleeding reported to IV in right wrist by pt, IV assessed and small amount of sanguineous drainage noted, attempted IV flush and pt reported burning, infiltration noted. IV removed.

## 2019-05-12 PROBLEM — E87.6 HYPOKALEMIA: Status: RESOLVED | Noted: 2019-05-11 | Resolved: 2019-05-12

## 2019-05-12 LAB
ABO + RH BLD: NORMAL
ALBUMIN SERPL BCP-MCNC: 3.2 G/DL (ref 3.5–5.2)
ALP SERPL-CCNC: 81 U/L (ref 55–135)
ALT SERPL W/O P-5'-P-CCNC: 23 U/L (ref 10–44)
ANION GAP SERPL CALC-SCNC: 7 MMOL/L (ref 8–16)
AST SERPL-CCNC: 40 U/L (ref 10–40)
BASOPHILS # BLD AUTO: 0.03 K/UL (ref 0–0.2)
BASOPHILS NFR BLD: 0.4 % (ref 0–1.9)
BILIRUB SERPL-MCNC: 2.6 MG/DL (ref 0.1–1)
BLD GP AB SCN CELLS X3 SERPL QL: NORMAL
BUN SERPL-MCNC: 26 MG/DL (ref 6–20)
CALCIUM SERPL-MCNC: 9.2 MG/DL (ref 8.7–10.5)
CHLORIDE SERPL-SCNC: 100 MMOL/L (ref 95–110)
CK SERPL-CCNC: 300 U/L (ref 20–200)
CO2 SERPL-SCNC: 30 MMOL/L (ref 23–29)
CREAT SERPL-MCNC: 2.4 MG/DL (ref 0.5–1.4)
DIFFERENTIAL METHOD: ABNORMAL
EOSINOPHIL # BLD AUTO: 0.1 K/UL (ref 0–0.5)
EOSINOPHIL NFR BLD: 0.8 % (ref 0–8)
ERYTHROCYTE [DISTWIDTH] IN BLOOD BY AUTOMATED COUNT: 14 % (ref 11.5–14.5)
EST. GFR  (AFRICAN AMERICAN): 37 ML/MIN/1.73 M^2
EST. GFR  (NON AFRICAN AMERICAN): 32 ML/MIN/1.73 M^2
GLUCOSE SERPL-MCNC: 116 MG/DL (ref 70–110)
HCT VFR BLD AUTO: 31.3 % (ref 40–54)
HGB BLD-MCNC: 10.4 G/DL (ref 14–18)
INR PPP: 1.3 (ref 0.8–1.2)
LYMPHOCYTES # BLD AUTO: 1.8 K/UL (ref 1–4.8)
LYMPHOCYTES NFR BLD: 23.3 % (ref 18–48)
MAGNESIUM SERPL-MCNC: 2 MG/DL (ref 1.6–2.6)
MCH RBC QN AUTO: 32.5 PG (ref 27–31)
MCHC RBC AUTO-ENTMCNC: 33.2 G/DL (ref 32–36)
MCV RBC AUTO: 98 FL (ref 82–98)
MONOCYTES # BLD AUTO: 0.9 K/UL (ref 0.3–1)
MONOCYTES NFR BLD: 11.1 % (ref 4–15)
NEUTROPHILS # BLD AUTO: 5 K/UL (ref 1.8–7.7)
NEUTROPHILS NFR BLD: 64.1 % (ref 38–73)
PHOSPHATE SERPL-MCNC: 2.7 MG/DL (ref 2.7–4.5)
PLATELET # BLD AUTO: 147 K/UL (ref 150–350)
PMV BLD AUTO: 11.1 FL (ref 9.2–12.9)
POTASSIUM SERPL-SCNC: 3.6 MMOL/L (ref 3.5–5.1)
PROT SERPL-MCNC: 8.1 G/DL (ref 6–8.4)
PROTHROMBIN TIME: 14.7 SEC (ref 9–12.5)
RBC # BLD AUTO: 3.2 M/UL (ref 4.6–6.2)
SODIUM SERPL-SCNC: 137 MMOL/L (ref 136–145)
WBC # BLD AUTO: 7.81 K/UL (ref 3.9–12.7)

## 2019-05-12 PROCEDURE — 85610 PROTHROMBIN TIME: CPT

## 2019-05-12 PROCEDURE — 36415 COLL VENOUS BLD VENIPUNCTURE: CPT

## 2019-05-12 PROCEDURE — 25000003 PHARM REV CODE 250: Performed by: NURSE PRACTITIONER

## 2019-05-12 PROCEDURE — 82550 ASSAY OF CK (CPK): CPT

## 2019-05-12 PROCEDURE — 84100 ASSAY OF PHOSPHORUS: CPT

## 2019-05-12 PROCEDURE — 80053 COMPREHEN METABOLIC PANEL: CPT

## 2019-05-12 PROCEDURE — 99232 SBSQ HOSP IP/OBS MODERATE 35: CPT | Mod: ,,, | Performed by: HOSPITALIST

## 2019-05-12 PROCEDURE — 25000003 PHARM REV CODE 250: Performed by: INTERNAL MEDICINE

## 2019-05-12 PROCEDURE — 11000001 HC ACUTE MED/SURG PRIVATE ROOM

## 2019-05-12 PROCEDURE — 25000003 PHARM REV CODE 250: Performed by: HOSPITALIST

## 2019-05-12 PROCEDURE — 63600175 PHARM REV CODE 636 W HCPCS: Performed by: NURSE PRACTITIONER

## 2019-05-12 PROCEDURE — 85025 COMPLETE CBC W/AUTO DIFF WBC: CPT

## 2019-05-12 PROCEDURE — 99232 PR SUBSEQUENT HOSPITAL CARE,LEVL II: ICD-10-PCS | Mod: ,,, | Performed by: HOSPITALIST

## 2019-05-12 PROCEDURE — 86901 BLOOD TYPING SEROLOGIC RH(D): CPT

## 2019-05-12 PROCEDURE — 94761 N-INVAS EAR/PLS OXIMETRY MLT: CPT

## 2019-05-12 PROCEDURE — 63600175 PHARM REV CODE 636 W HCPCS: Performed by: HOSPITALIST

## 2019-05-12 PROCEDURE — 83735 ASSAY OF MAGNESIUM: CPT

## 2019-05-12 RX ORDER — SODIUM CHLORIDE 9 MG/ML
INJECTION, SOLUTION INTRAVENOUS CONTINUOUS
Status: DISCONTINUED | OUTPATIENT
Start: 2019-05-12 | End: 2019-05-13 | Stop reason: HOSPADM

## 2019-05-12 RX ADMIN — PROMETHAZINE HYDROCHLORIDE 12.5 MG: 25 INJECTION INTRAMUSCULAR; INTRAVENOUS at 03:05

## 2019-05-12 RX ADMIN — PROMETHAZINE HYDROCHLORIDE 12.5 MG: 25 INJECTION INTRAMUSCULAR; INTRAVENOUS at 09:05

## 2019-05-12 RX ADMIN — SODIUM CHLORIDE, SODIUM LACTATE, POTASSIUM CHLORIDE, AND CALCIUM CHLORIDE: .6; .31; .03; .02 INJECTION, SOLUTION INTRAVENOUS at 09:05

## 2019-05-12 RX ADMIN — SODIUM CHLORIDE: 0.9 INJECTION, SOLUTION INTRAVENOUS at 03:05

## 2019-05-12 RX ADMIN — PROMETHAZINE HYDROCHLORIDE 12.5 MG: 25 INJECTION INTRAMUSCULAR; INTRAVENOUS at 05:05

## 2019-05-12 NOTE — PROGRESS NOTES
[facility] Hospital Medicine  Progress Note    Patient Name: Irvin Diaz  MRN: [id number]  Patient Class: IP- Inpatient   Admission Date: 5/11/2019  Length of Stay: 1 days  Attending Physician: Mila Kinney MD  Primary Care Provider: Primary Doctor No        Subjective:     Principal Problem:Acute renal failure with tubular necrosis    HPI:  Mr. Irvin Diaz is a 44yo male with alcoholic cirrhosis, complicated by esophageal varices s/p banding for hematemesis, thrombocytopenia, and coagulopathy, and history of pulmonary embolism (diagnosed Oct 2018, not on anticoagulation because of high risk for bleeding), who presents to the emergency department after he had 2 syncopal episodes at work.  He mentions that he started to feel poorly on 5/8 with some nausea without hematemesis.  He underwent an EGD 5/9 to evaluate his varices, which he was told were stable.  Since then, he has continued to feel nauseous with weakness and decreased p.o. intake.  He went to work the day of admission, and had a syncopal episode, and then had another.  He denies any alcohol intake the last few weeks.  He denies any chest pain or shortness of breath or LE swelling.  He claims to be compliant all of his medications.   He claims that he is not on any anticoagulation because of his varices with hematemesis.  Of note, he recently had a fall with his dog, landing on his left hip.  Since then, he has been ambulating with a limp.  He was told at OSH that he had a fracture.    In the emergency department, he was found to have a blood pressure of 93/53 on arrival, but was reported by EMS to have a systolic blood pressure in the 60s.  Labs were notable for a creatinine of 6.1 up from a baseline of 1.  He was given IV fluids with improvement in his creatinine down to 5 while in the emergency department.  Nephrology was consulted.  He was admitted to Hospital Medicine for further management.    Hospital Course:    Joe was admitted to Hospital Medicine for management of his acute renal failure.  Creatinine was 6 on admit that trended down in the ED with IVF.  Nephro was consulted.  Renal US was normal.  LE US was negative for DVT. MRI of the hip showed an effusion and AVN.  Ortho was consulted.    Interval History: Admitted to Hospital Medicine overnight.  Had nausea and vomiting that responded to Phenergan.  Creatinine trended down this morning.  Continues to have good UOP    Review of Systems   Constitutional: Negative for chills, fatigue and fever.   Respiratory: Negative for cough and shortness of breath.    Cardiovascular: Negative for chest pain, palpitations and leg swelling.   Gastrointestinal: Positive for nausea and vomiting. Negative for abdominal pain and diarrhea.   Genitourinary: Negative for dysuria and urgency.   Musculoskeletal: Positive for arthralgias (Hip pain) and gait problem.   Neurological: Negative for dizziness and headaches.   All other systems reviewed and are negative.    Objective:     Vital Signs (Most Recent):  Temp: 98.9 °F (37.2 °C) (05/12/19 0706)  Pulse: 73 (05/12/19 0706)  Resp: 16 (05/12/19 0706)  BP: 137/66 (05/12/19 0706)  SpO2: 96 % (05/12/19 0706) Vital Signs (24h Range):  Temp:  [98.1 °F (36.7 °C)-99.5 °F (37.5 °C)] 98.9 °F (37.2 °C)  Pulse:  [72-88] 73  Resp:  [14-21] 16  SpO2:  [96 %-100 %] 96 %  BP: ()/(49-79) 137/66     Weight: 81.8 kg (180 lb 5.4 oz)  Body mass index is 24.46 kg/m².    Intake/Output Summary (Last 24 hours) at 5/12/2019 1235  Last data filed at 5/12/2019 1100  Gross per 24 hour   Intake 4604.33 ml   Output 3600 ml   Net 1004.33 ml      Physical Exam   Constitutional: He is oriented to person, place, and time. He appears well-developed and well-nourished.   HENT:   Head: Normocephalic and atraumatic.   Cardiovascular: Normal rate and regular rhythm.   No murmur heard.  Pulmonary/Chest: Effort normal and breath sounds normal. No respiratory distress. He  has no wheezes. He has no rales.   Abdominal: Soft. He exhibits no distension. There is no tenderness.   Musculoskeletal: He exhibits no edema or deformity.   Neurological: He is alert and oriented to person, place, and time.   Skin: Skin is warm and dry. He is not diaphoretic.       Significant Labs:   CBC:   Recent Labs   Lab 05/11/19  1346 05/11/19  1541 05/12/19  0537   WBC 9.34 8.79 7.81   HGB 9.3* 9.7* 10.4*   HCT 28.0* 29.3* 31.3*   * 129* 147*     CMP:   Recent Labs   Lab 05/11/19  1346 05/11/19  1541 05/12/19  0537   * 135* 137   K 3.3* 3.3* 3.6   CL 95 100 100   CO2 23 24 30*   * 94 116*   BUN 38* 35* 26*   CREATININE 6.1* 5.0* 2.4*   CALCIUM 8.6* 8.4* 9.2   PROT 8.2 8.2 8.1   ALBUMIN 3.4* 3.3* 3.2*   BILITOT 2.9* 2.7* 2.6*   ALKPHOS 78 86 81   AST 42* 39 40   ALT 24 22 23   ANIONGAP 15 11 7*   EGFRNONAA 10* 13* 32*     All pertinent labs within the past 24 hours have been reviewed.    Significant Imaging: U/S: I have reviewed all pertinent results/findings within the past 24 hours and my personal findings are:  No DVT    Assessment/Plan:      * Acute renal failure with tubular necrosis  · Creatinine 6.1 on admit, baseline around 1.  Trended down to 2.4  · Likely pre-renal from dehydration and volume losses with intra-renal from hypotension from Lisinopril/HCTZ  · Renal US with no acute pathology  · Strict I&Os  · IVF with LR  · Avoid Nephrotoxic agents  · Nephro consulted, appreciate assistance      History of pulmonary embolus (PE)  · Diagnosed with PE Oct 2018 and was started on Warfarin for increased bleeding risk because of liver disease and varices  · Hasn't been on treatment  · LE US negative for treatment  · VQ scan ordered to evaluate PE.  Would benefit from repeat CTA outpatient to monitor PE progression  · Likely to need IVC filter since not a good candidate for anticoagulation  · Will need to discuss risk/benefit with patient but will start Heparin TID for prophylaxis for  now until further evaluated (holding in case Ortho taps hip effusion).  If positive, will need Heparin gtt in the meantime       AVN (avascular necrosis of bone)  · Reports he had a fall with his dog, landing on his left hip  · Has been ambulating with a limp  · OSH CT scan with reported fracture (unable to be seen on Epic or Care Everywhere) but XR here with AVN  · MRI with AVN and hip effusion with elevated ESR and CRP. Ortho consulted in case tap is needed  · PT eval for DME    Esophageal varices without bleeding  · Serial EGD on 5/9 with no acute issues  · Monitor for bleeding      Anemia  · Hgb 9.7, around baseline.  Currently 10.4  · Likely to drop with IVF  · Monitor      Thrombocytopenia  · Plt chronically low 2/2 liver disease  · Monitor for bleeding      Coagulopathy  · 2/2 liver disease  · Monitor      Alcoholic cirrhosis  MELD-Na score: 21 at 5/12/2019  5:37 AM  MELD score: 21 at 5/12/2019  5:37 AM  Calculated from:  Serum Creatinine: 2.4 mg/dL at 5/12/2019  5:37 AM  Serum Sodium: 137 mmol/L at 5/12/2019  5:37 AM  Total Bilirubin: 2.6 mg/dL at 5/12/2019  5:37 AM  INR(ratio): 1.3 at 5/12/2019  5:37 AM  Age: 43 years  · Worsening MELD 2/2 renal failure  · IVF as above  · Check Hep panel  · Will need referral to Hepatology outpatient        VTE Risk Mitigation (From admission, onward)        Ordered     IP VTE LOW RISK PATIENT  Once      05/11/19 1625     Place sequential compression device  Until discontinued      05/11/19 1625          Possibly home richard pending creatinine, PT and Ortho recs      Mila Kinney MD  Department of Hospital Medicine   [facility]

## 2019-05-12 NOTE — ASSESSMENT & PLAN NOTE
· Diagnosed with PE Oct 2018 and was started on Warfarin for increased bleeding risk because of liver disease and varices  · Hasn't been on treatment  · LE US negative for treatment  · VQ scan ordered to evaluate PE.  Would benefit from repeat CTA outpatient to monitor PE progression  · Likely to need IVC filter since not a good candidate for anticoagulation  · Will need to discuss risk/benefit with patient but will start Heparin TID for prophylaxis for now until further evaluated (holding in case Ortho taps hip effusion).  If positive, will need Heparin gtt in the meantime

## 2019-05-12 NOTE — HOSPITAL COURSE
Mr. Diaz was admitted to Hospital Medicine for management of his acute renal failure.  Creatinine was 6 on admit that trended down back to baseline with IVF.  Nephro was consulted.  Renal US was normal.  LE US was negative for DVT, and VQ study was negative for PE.  MRI of the hip showed an effusion and AVN with femoral neck collapse.  Ortho was consulted, who suggest NWB to LLE, as he is currently high risk for IVETH.  PT was consulted for DME recs.  He was discharged with a rolling walker and Ortho f/u in 1-2 weeks.

## 2019-05-12 NOTE — PLAN OF CARE
Problem: Adult Inpatient Plan of Care  Goal: Plan of Care Review  Outcome: Ongoing (interventions implemented as appropriate)  Pt resting in bed. NAD, VSS on RA, AOx4. Pt complaining of nausea. PRN meds given. Ambulatory with stand by assist. Urinal at the bedside. PT consulted. POC reviewed with pt. Bed low and locked. Personal items and call light within reach. Will continue to monitor.

## 2019-05-12 NOTE — PT/OT/SLP PROGRESS
Physical Therapy - Pt not seen      Patient Name:  Irvin Diaz   MRN:  [id number]    Patient not seen today secondary to Other  MRI of the L hip showed an effusion and AVN.  Ortho was consulted.    Will follow-up after ortho consult.    Hossein Pollard, PT

## 2019-05-12 NOTE — PROGRESS NOTES
Consult Note  Nephrology    Consult Requested By: Mila Kinney MD  Reason for Consult: ENA    SUBJECTIVE:     History of Present Illness:  Patient is a 43 y.o. male presents with weakness found to be severely hypotensive by EMS and upon eval in ED Cr noted to be 6.1 with baseline a few days ago .of 1.12. He unfortunately had taken his BP meds this am which include lisinopril-HCTZ, Metorprol and clonidine. Thus far he has been given  2L NS in ED with some improvement in BP and minimal urineoutput of 25cc. He was recently admitted to [facility] with Dx of PE and early cirrhosis due to ETOH.    Interval History:    Pt reports increased nausea and vomiting today such that he cannot tolerate liquids or solid intake. Denies f/c/cough/ abd pain or diarrhea. Good UOP of 2.7L, no LUTS.    Past Medical History:   Diagnosis Date    Alcoholic cirrhosis of liver     Arthritis     Hypertension      Past Surgical History:   Procedure Laterality Date    COLON SURGERY      HERNIA REPAIR       History reviewed. No pertinent family history.  Social History     Tobacco Use    Smoking status: Never Smoker   Substance Use Topics    Alcohol use: Not Currently     Comment: freq    Drug use: Yes     Types: Marijuana       Medications Prior to Admission   Medication Sig Dispense Refill Last Dose    carvedilol (COREG) 6.25 MG tablet TK 1 T PO  BID WITH MEALS  3 5/11/2019    lisinopril-hydrochlorothiazide (PRINZIDE,ZESTORETIC) 20-25 mg Tab Take 1 tablet by mouth once daily.   5/11/2019    cloNIDine (CATAPRES) 0.1 MG tablet Take 1 tablet (0.1 mg total) by mouth 2 (two) times daily. 60 tablet 10 Unknown    methocarbamol (ROBAXIN) 500 MG Tab TAKE 1 TABLET BY MOUTH THREE TIMES A DAY AS NEEDED FOR MUSCLE SPASMS  0 More than a month at Unknown time       Review of patient's allergies indicates:  No Known Allergies     Review of Systems:  Constitutional: No fever or chills, no weight changes.  Eyes: No visual changes or photophobia  HEENT:  No nasal congestion or sore throat  Respiratory: No cough or shorness of breath  Cardiovascular: No chest pain or palpitations  Gastrointestinal: Good appetite, no nausea or vomiting, no change in bowel habits  Genitourinary: No hematuria or dysuria  Skin: No rash or pruritis  Hematologic/lymphatic: No easy bruising, bleeding or lymphadenopathy  Musculoskeletal: No arthralgias or myalgias  Neurological: No seizures or tremors  Endocrine: No heat/cold intolerance.  No polyuria/polydipsia.  Psychiatric:  No depression or anxiety.     OBJECTIVE:     Vital Signs (Most Recent)  Temp: 98.7 °F (37.1 °C) (05/12/19 1311)  Pulse: (!) 59 (05/12/19 1311)  Resp: 16 (05/12/19 1311)  BP: (!) 153/82 (05/12/19 1311)  SpO2: 99 % (05/12/19 1311)    Vital Signs Range (Last 24H):  Temp:  [98.7 °F (37.1 °C)-99.5 °F (37.5 °C)]   Pulse:  [59-88]   Resp:  [14-21]   BP: ()/(49-82)   SpO2:  [96 %-100 %]     Physical Exam:    General appearance: Well developed, well nourished  Head: Normocephalic, atraumatic  Eyes:  Conjunctivae nl. Sclera anicteric. PERRL.  HEENT: Lips, mucosa, and tongue normal; teeth and gums normal and oropharynx clear.  Neck: Supple, trachea midline, thyroid not enlarged,   Lungs: Clear to auscultation bilaterally and normal respiratory effort  Heart: Regular rate and rhythm, S1, S2 normal, no murmur, click, rub or gallop  Abdomen: Soft, non-tender non-distended; bowel sounds normal; no masses,  no organomegaly  Extremities: No cyanosis or clubbing. No edema  Pulses: 2+ and symmetric  Skin: Skin color, texture, turgor normal. No rashes or lesions  Lymph nodes: Cervical, supraclavicular, and axillary nodes normal.  Neurologic: Normal strength and tone. No focal numbness or weakness  Psychiatric:  Alert and oriented times 3.  Affect appropriate.     Diagnostic Results:  Labs: Reviewed    ASSESSMENT/PLAN:     1. ENA/ Hypokalemia  -Baseline Cr 1.1-1.2 on 5/6/19   - Appears pre-renal /early ATN   -Continue with volume  repletion but would change to NS today.   -K normalized.  -Renal US normal.  -Cr down from 6.0 to 2.4 today    2. Hypotension  -unfortunately he took his home meds on day of admission.  -Now held  -Continue IVFs.  -Would not over treat BP especially in face of ongoing N/V.    3. Anemia with Hx of GIB in past/ Thrombocytopenia/ Coagulopathy  -Hgb a few days ago was 10.6 and now 10.4  -Plts better today, monitor..  - INR currently 1.3    4. ETOH Cirrhosis  -pending w/u at [facility]    5. 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 Hx of PE  -Has not taken Coumadin as Rx'd for 6 mos due to had GIB on Advil.  -Can consider rescan with V/C,to assess need for full anti-coagulation vs prophylaxis.   dopplers done and are neg   -Cannot have IV contrast.    Nausea and Vomiting  -defer to primary team

## 2019-05-12 NOTE — ASSESSMENT & PLAN NOTE
· Reports he had a fall with his dog, landing on his left hip  · Has been ambulating with a limp  · OSH CT scan with reported fracture (unable to be seen on Epic or Care Everywhere) but XR here with AVN  · MRI with AVN and hip effusion with elevated ESR and CRP. Ortho consulted in case tap is needed  · PT eval for DME

## 2019-05-12 NOTE — ASSESSMENT & PLAN NOTE
· Creatinine 6.1 on admit, baseline around 1.  Trended down to 2.4  · Likely pre-renal from dehydration and volume losses with intra-renal from hypotension from Lisinopril/HCTZ  · Renal US with no acute pathology  · Strict I&Os  · IVF with LR  · Avoid Nephrotoxic agents  · Nephro consulted, appreciate assistance

## 2019-05-12 NOTE — CONSULTS
[facility]  Orthopedics  Consult Note    Patient Name: Irvin Diaz  MRN: [id number]  Admission Date: 5/11/2019  Hospital Length of Stay: 1 days  Attending Provider: Mila Kineny MD  Primary Care Provider: Primary Doctor No    Patient information was obtained from ER records.     Inpatient consult to Orthopedic Surgery  Consult performed by: Gurpreet Pat MD  Consult ordered by: Mila Kinney MD        Subjective:     Principal Problem:Acute renal failure with tubular necrosis    Chief Complaint:   Chief Complaint   Patient presents with    Hypotension     Pt with hypotension (initial BP 60/30's), hip pain and alledged hallucinations (pt denies), denies trauma, received 1l NS per EMS        HPI:  43-year-old gentleman with alcoholic cirrhosis, history of varices with banding, also pulmonary embolus in 2018 on no anticoagulation secondary to unacceptable risk of bleeding is admitted for acute renal failure.  He also complains of left hip pain.  X-ray showed avascular necrosis of the left femoral head with collapse.    Past Medical History:   Diagnosis Date    Alcoholic cirrhosis of liver     Arthritis     Hypertension        Past Surgical History:   Procedure Laterality Date    COLON SURGERY      HERNIA REPAIR         Review of patient's allergies indicates:  No Known Allergies    Current Facility-Administered Medications   Medication    acetaminophen tablet 650 mg    dextrose 50% injection 12.5 g    dextrose 50% injection 25 g    glucagon (human recombinant) injection 1 mg    glucose chewable tablet 16 g    glucose chewable tablet 24 g    lactated ringers infusion    polyethylene glycol packet 17 g    promethazine (PHENERGAN) 12.5 mg in dextrose 5 % 50 mL IVPB    senna-docusate 8.6-50 mg per tablet 1 tablet    sodium chloride 0.9% flush 10 mL    sodium chloride 0.9% flush 5 mL     Family History     None        Tobacco Use    Smoking status: Never Smoker    Substance and Sexual Activity    Alcohol use: Not Currently     Comment: nicoq    Drug use: Yes     Types: Marijuana    Sexual activity: Not on file     Comment: occ     ROS  Objective:     Vital Signs (Most Recent):  Temp: 98.7 °F (37.1 °C) (05/12/19 1311)  Pulse: (!) 59 (05/12/19 1311)  Resp: 16 (05/12/19 1311)  BP: (!) 153/82 (05/12/19 1311)  SpO2: 99 % (05/12/19 1311) Vital Signs (24h Range):  Temp:  [98.1 °F (36.7 °C)-99.5 °F (37.5 °C)] 98.7 °F (37.1 °C)  Pulse:  [59-88] 59  Resp:  [14-21] 16  SpO2:  [96 %-100 %] 99 %  BP: ()/(49-82) 153/82     Weight: 81.8 kg (180 lb 5.4 oz)  Height: 6' (182.9 cm)  Body mass index is 24.46 kg/m².      Intake/Output Summary (Last 24 hours) at 5/12/2019 1432  Last data filed at 5/12/2019 1100  Gross per 24 hour   Intake 3604.33 ml   Output 3500 ml   Net 104.33 ml       Ortho/SPM Exam  Left hip:  Slightly shortened with resting external rotation.  Pain with logroll.  1+ DP pulse.  Wiggles toes.    Significant Labs:   CBC:   Recent Labs   Lab 05/11/19  1346 05/11/19  1541 05/12/19  0537   WBC 9.34 8.79 7.81   HGB 9.3* 9.7* 10.4*   HCT 28.0* 29.3* 31.3*   * 129* 147*     All pertinent labs within the past 24 hours have been reviewed.    Significant Imaging: X-Ray: I have reviewed all pertinent results/findings and my personal findings are:  AVN of the left femoral head/neck with collapse    Assessment/Plan:     Active Diagnoses:    Diagnosis Date Noted POA    PRINCIPAL PROBLEM:  Acute renal failure with tubular necrosis [N17.0] 05/11/2019 Yes    Alcoholic cirrhosis [K70.30] 05/11/2019 Yes    Coagulopathy [D68.9] 05/11/2019 Yes    Thrombocytopenia [D69.6] 05/11/2019 Yes    History of pulmonary embolus (PE) [Z86.711] 05/11/2019 Yes    Anemia [D64.9] 05/11/2019 Yes    Esophageal varices without bleeding [I85.00] 05/11/2019 Yes    AVN (avascular necrosis of bone) [M87.00] 05/11/2019 Yes      Problems Resolved During this Admission:    Diagnosis Date Noted Date  Resolved POA    Hypokalemia [E87.6] 05/11/2019 05/12/2019 Yes       Left femoral head/neck AVN with collapse    - NWB LLE  - definitive treatment is IVETH but risk of elective surgery at this time is unacceptably high. He is at extremely high risk for complications if he were to undergo an arthroplasty procedure. NWB x 2 weeks, the can progress to WBAT if symptoms allow      Gurpreet Toussaint MD  Orthopedics  [facility]

## 2019-05-12 NOTE — PLAN OF CARE
Met with patient at bedside to complete discharge planning assessment. Patient is current with Dr Beasley from [facility] on [facility] for primary care - unable to locate in [facility]. Pharmacy of choice is [facility] [address] [facility] updated. Patient's wife will provide transportation home. Patient denied any anticipated DC needs      05/12/19 1027   Discharge Assessment   Assessment Type Discharge Planning Assessment   Confirmed/corrected address and phone number on facesheet? Yes   Assessment information obtained from? Patient   Communicated expected length of stay with patient/caregiver no   Prior to hospitilization cognitive status: Alert/Oriented   Prior to hospitalization functional status: Independent   Current cognitive status: Alert/Oriented   Current Functional Status: Independent   Lives With spouse;child(yolanda), dependent   Able to Return to Prior Arrangements yes   Is patient able to care for self after discharge? Yes   Patient's perception of discharge disposition home or selfcare   Readmission Within the Last 30 Days no previous admission in last 30 days   Patient currently being followed by outpatient case management? No   Patient currently receives any other outside agency services? No   Equipment Currently Used at Home none   Do you have any problems affording any of your prescribed medications? No   Is the patient taking medications as prescribed? yes   Does the patient have transportation home? Yes   Transportation Anticipated family or friend will provide   Does the patient receive services at the Coumadin Clinic? No   Discharge Plan A Home with family   DME Needed Upon Discharge  none   Patient/Family in Agreement with Plan yes

## 2019-05-12 NOTE — ASSESSMENT & PLAN NOTE
MELD-Na score: 21 at 5/12/2019  5:37 AM  MELD score: 21 at 5/12/2019  5:37 AM  Calculated from:  Serum Creatinine: 2.4 mg/dL at 5/12/2019  5:37 AM  Serum Sodium: 137 mmol/L at 5/12/2019  5:37 AM  Total Bilirubin: 2.6 mg/dL at 5/12/2019  5:37 AM  INR(ratio): 1.3 at 5/12/2019  5:37 AM  Age: 43 years  · Worsening MELD 2/2 renal failure  · IVF as above  · Check Hep panel  · Will need referral to Hepatology outpatient

## 2019-05-12 NOTE — SUBJECTIVE & OBJECTIVE
Interval History: Admitted to Hospital Medicine overnight.  Had nausea and vomiting that responded to Phenergan.  Creatinine trended down this morning.  Continues to have good UOP    Review of Systems   Constitutional: Negative for chills, fatigue and fever.   Respiratory: Negative for cough and shortness of breath.    Cardiovascular: Negative for chest pain, palpitations and leg swelling.   Gastrointestinal: Positive for nausea and vomiting. Negative for abdominal pain and diarrhea.   Genitourinary: Negative for dysuria and urgency.   Musculoskeletal: Positive for arthralgias (Hip pain) and gait problem.   Neurological: Negative for dizziness and headaches.   All other systems reviewed and are negative.    Objective:     Vital Signs (Most Recent):  Temp: 98.9 °F (37.2 °C) (05/12/19 0706)  Pulse: 73 (05/12/19 0706)  Resp: 16 (05/12/19 0706)  BP: 137/66 (05/12/19 0706)  SpO2: 96 % (05/12/19 0706) Vital Signs (24h Range):  Temp:  [98.1 °F (36.7 °C)-99.5 °F (37.5 °C)] 98.9 °F (37.2 °C)  Pulse:  [72-88] 73  Resp:  [14-21] 16  SpO2:  [96 %-100 %] 96 %  BP: ()/(49-79) 137/66     Weight: 81.8 kg (180 lb 5.4 oz)  Body mass index is 24.46 kg/m².    Intake/Output Summary (Last 24 hours) at 5/12/2019 1235  Last data filed at 5/12/2019 1100  Gross per 24 hour   Intake 4604.33 ml   Output 3600 ml   Net 1004.33 ml      Physical Exam   Constitutional: He is oriented to person, place, and time. He appears well-developed and well-nourished.   HENT:   Head: Normocephalic and atraumatic.   Cardiovascular: Normal rate and regular rhythm.   No murmur heard.  Pulmonary/Chest: Effort normal and breath sounds normal. No respiratory distress. He has no wheezes. He has no rales.   Abdominal: Soft. He exhibits no distension. There is no tenderness.   Musculoskeletal: He exhibits no edema or deformity.   Neurological: He is alert and oriented to person, place, and time.   Skin: Skin is warm and dry. He is not diaphoretic.        Significant Labs:   CBC:   Recent Labs   Lab 05/11/19  1346 05/11/19  1541 05/12/19  0537   WBC 9.34 8.79 7.81   HGB 9.3* 9.7* 10.4*   HCT 28.0* 29.3* 31.3*   * 129* 147*     CMP:   Recent Labs   Lab 05/11/19  1346 05/11/19  1541 05/12/19  0537   * 135* 137   K 3.3* 3.3* 3.6   CL 95 100 100   CO2 23 24 30*   * 94 116*   BUN 38* 35* 26*   CREATININE 6.1* 5.0* 2.4*   CALCIUM 8.6* 8.4* 9.2   PROT 8.2 8.2 8.1   ALBUMIN 3.4* 3.3* 3.2*   BILITOT 2.9* 2.7* 2.6*   ALKPHOS 78 86 81   AST 42* 39 40   ALT 24 22 23   ANIONGAP 15 11 7*   EGFRNONAA 10* 13* 32*     All pertinent labs within the past 24 hours have been reviewed.    Significant Imaging: U/S: I have reviewed all pertinent results/findings within the past 24 hours and my personal findings are:  No DVT

## 2019-05-13 VITALS
DIASTOLIC BLOOD PRESSURE: 78 MMHG | WEIGHT: 180.31 LBS | BODY MASS INDEX: 24.42 KG/M2 | RESPIRATION RATE: 17 BRPM | TEMPERATURE: 98 F | HEIGHT: 72 IN | HEART RATE: 72 BPM | SYSTOLIC BLOOD PRESSURE: 136 MMHG | OXYGEN SATURATION: 98 %

## 2019-05-13 LAB
ALBUMIN SERPL BCP-MCNC: 3.2 G/DL (ref 3.5–5.2)
ALP SERPL-CCNC: 80 U/L (ref 55–135)
ALT SERPL W/O P-5'-P-CCNC: 23 U/L (ref 10–44)
ANION GAP SERPL CALC-SCNC: 7 MMOL/L (ref 8–16)
AST SERPL-CCNC: 41 U/L (ref 10–40)
BASOPHILS # BLD AUTO: 0.03 K/UL (ref 0–0.2)
BASOPHILS NFR BLD: 0.4 % (ref 0–1.9)
BILIRUB SERPL-MCNC: 2.5 MG/DL (ref 0.1–1)
BUN SERPL-MCNC: 14 MG/DL (ref 6–20)
CALCIUM SERPL-MCNC: 9.3 MG/DL (ref 8.7–10.5)
CHLORIDE SERPL-SCNC: 101 MMOL/L (ref 95–110)
CO2 SERPL-SCNC: 33 MMOL/L (ref 23–29)
CREAT SERPL-MCNC: 1.5 MG/DL (ref 0.5–1.4)
DIFFERENTIAL METHOD: ABNORMAL
EOSINOPHIL # BLD AUTO: 0.1 K/UL (ref 0–0.5)
EOSINOPHIL NFR BLD: 1.6 % (ref 0–8)
ERYTHROCYTE [DISTWIDTH] IN BLOOD BY AUTOMATED COUNT: 14.1 % (ref 11.5–14.5)
EST. GFR  (AFRICAN AMERICAN): >60 ML/MIN/1.73 M^2
EST. GFR  (NON AFRICAN AMERICAN): 56 ML/MIN/1.73 M^2
GLUCOSE SERPL-MCNC: 104 MG/DL (ref 70–110)
HAV IGM SERPL QL IA: NEGATIVE
HBV CORE IGM SERPL QL IA: NEGATIVE
HBV SURFACE AG SERPL QL IA: NEGATIVE
HCT VFR BLD AUTO: 31.8 % (ref 40–54)
HCV AB SERPL QL IA: NEGATIVE
HGB BLD-MCNC: 10.5 G/DL (ref 14–18)
INR PPP: 1.4 (ref 0.8–1.2)
LYMPHOCYTES # BLD AUTO: 2.9 K/UL (ref 1–4.8)
LYMPHOCYTES NFR BLD: 37.3 % (ref 18–48)
MAGNESIUM SERPL-MCNC: 1.8 MG/DL (ref 1.6–2.6)
MCH RBC QN AUTO: 32.7 PG (ref 27–31)
MCHC RBC AUTO-ENTMCNC: 33 G/DL (ref 32–36)
MCV RBC AUTO: 99 FL (ref 82–98)
MONOCYTES # BLD AUTO: 0.7 K/UL (ref 0.3–1)
MONOCYTES NFR BLD: 8.5 % (ref 4–15)
NEUTROPHILS # BLD AUTO: 4 K/UL (ref 1.8–7.7)
NEUTROPHILS NFR BLD: 52.1 % (ref 38–73)
PHOSPHATE SERPL-MCNC: 3 MG/DL (ref 2.7–4.5)
PLATELET # BLD AUTO: 135 K/UL (ref 150–350)
PMV BLD AUTO: 10.8 FL (ref 9.2–12.9)
POTASSIUM SERPL-SCNC: 3.7 MMOL/L (ref 3.5–5.1)
PROT SERPL-MCNC: 8.2 G/DL (ref 6–8.4)
PROTHROMBIN TIME: 15.6 SEC (ref 9–12.5)
RBC # BLD AUTO: 3.21 M/UL (ref 4.6–6.2)
SODIUM SERPL-SCNC: 141 MMOL/L (ref 136–145)
WBC # BLD AUTO: 7.69 K/UL (ref 3.9–12.7)

## 2019-05-13 PROCEDURE — 80053 COMPREHEN METABOLIC PANEL: CPT

## 2019-05-13 PROCEDURE — 99239 HOSP IP/OBS DSCHRG MGMT >30: CPT | Mod: ,,, | Performed by: HOSPITALIST

## 2019-05-13 PROCEDURE — 36415 COLL VENOUS BLD VENIPUNCTURE: CPT

## 2019-05-13 PROCEDURE — 85610 PROTHROMBIN TIME: CPT

## 2019-05-13 PROCEDURE — 94761 N-INVAS EAR/PLS OXIMETRY MLT: CPT

## 2019-05-13 PROCEDURE — 99239 PR HOSPITAL DISCHARGE DAY,>30 MIN: ICD-10-PCS | Mod: ,,, | Performed by: HOSPITALIST

## 2019-05-13 PROCEDURE — 84100 ASSAY OF PHOSPHORUS: CPT

## 2019-05-13 PROCEDURE — 83735 ASSAY OF MAGNESIUM: CPT

## 2019-05-13 PROCEDURE — 85025 COMPLETE CBC W/AUTO DIFF WBC: CPT

## 2019-05-13 NOTE — ASSESSMENT & PLAN NOTE
· Reports he had a fall with his dog, landing on his left hip  · Has been ambulating with a limp  · OSH CT scan with reported fracture (unable to be seen on Epic or Care Everywhere) but XR here with AVN  · MRI with AVN with collapse of the femoral neck, and hip effusion  · Ortho says NWB to LLE - high risk for IVETH  · PT eval pending for DME  · Needs Ortho f/u outpatient

## 2019-05-13 NOTE — PT/OT/SLP DISCHARGE
Physical Therapy Discharge Summary    Name: Irvin Diaz  MRN: [id number]   Principal Problem: Acute renal failure with tubular necrosis     Patient Discharged from acute Physical Therapy prior to PT evaluation. PT attempted to see patient but patient d/c'ed few minutes prior. Spoke with RN who stated patient d/c'ed with RW.       Laney Villalpando, PT  5/13/2019

## 2019-05-13 NOTE — DISCHARGE SUMMARY
[facility] Hospital Medicine  Discharge Summary      Patient Name: Irvin Diaz  MRN: [id number]  Admission Date: 5/11/2019  Hospital Length of Stay: 2 days  Discharge Date and Time:  05/13/2019 1:59 PM  Attending Physician: No att. providers found   Discharging Provider: Mila Kinney MD  Primary Care Provider: Primary Doctor Ladi      HPI:   Mr. Irvin Diaz is a 44yo male with alcoholic cirrhosis, complicated by esophageal varices s/p banding for hematemesis, thrombocytopenia, and coagulopathy, and history of pulmonary embolism (diagnosed Oct 2018, not on anticoagulation because of high risk for bleeding), who presents to the emergency department after he had 2 syncopal episodes at work.  He mentions that he started to feel poorly on 5/8 with some nausea without hematemesis.  He underwent an EGD 5/9 to evaluate his varices, which he was told were stable.  Since then, he has continued to feel nauseous with weakness and decreased p.o. intake.  He went to work the day of admission, and had a syncopal episode, and then had another.  He denies any alcohol intake the last few weeks.  He denies any chest pain or shortness of breath or LE swelling.  He claims to be compliant all of his medications.   He claims that he is not on any anticoagulation because of his varices with hematemesis.  Of note, he recently had a fall with his dog, landing on his left hip.  Since then, he has been ambulating with a limp.  He was told at OSH that he had a fracture.    In the emergency department, he was found to have a blood pressure of 93/53 on arrival, but was reported by EMS to have a systolic blood pressure in the 60s.  Labs were notable for a creatinine of 6.1 up from a baseline of 1.  He was given IV fluids with improvement in his creatinine down to 5 while in the emergency department.  Nephrology was consulted.  He was admitted to Hospital Medicine for further management.    * No surgery found *       Hospital Course:   Mr. Diaz was admitted to Hospital Medicine for management of his acute renal failure.  Creatinine was 6 on admit that trended down back to baseline with IVF.  Nephro was consulted.  Renal US was normal.  LE US was negative for DVT, and VQ study was negative for PE.  MRI of the hip showed an effusion and AVN with femoral neck collapse.  Ortho was consulted, who suggest NWB to LLE, as he is currently high risk for IVETH.  PT was consulted for DME recs.  He was discharged with a rolling walker and Ortho f/u in 1-2 weeks.     Consults:   Consults (From admission, onward)        Status Ordering Provider     Inpatient consult to Nephrology  Once     Provider:  Precious Samson MD    Completed VERN SAMUELS     Inpatient consult to Orthopedic Surgery  Once     Provider:  Gurpreet Pat MD    Completed STACY WOLFF            Final Active Diagnoses:    Diagnosis Date Noted POA    PRINCIPAL PROBLEM:  Acute renal failure with tubular necrosis [N17.0] 05/11/2019 Yes    History of pulmonary embolus (PE) [Z86.711] 05/11/2019 Yes    Alcoholic cirrhosis [K70.30] 05/11/2019 Yes    Coagulopathy [D68.9] 05/11/2019 Yes    Thrombocytopenia [D69.6] 05/11/2019 Yes    Anemia [D64.9] 05/11/2019 Yes    Esophageal varices without bleeding [I85.00] 05/11/2019 Yes    AVN (avascular necrosis of bone) [M87.00] 05/11/2019 Yes      Problems Resolved During this Admission:    Diagnosis Date Noted Date Resolved POA    Hypokalemia [E87.6] 05/11/2019 05/12/2019 Yes       Discharged Condition: stable    Disposition: Home or Self Care    Follow Up:  Follow-up Information     Gurpreet Pat MD. Schedule an appointment as soon as possible for a visit in 1 week.    Specialty:  Orthopedic Surgery  Contact information:  [address] [place] [id number] [phone]                 Patient Instructions:      Ambulatory Referral to Hepatology   Referral Priority: Routine Referral Type:  Consultation   Referral Reason: Specialty Services Required   Number of Visits Requested: 1       Significant Diagnostic Studies: Labs:   CMP   Recent Labs   Lab 05/11/19  1541 05/12/19  0537 05/13/19  0433   * 137 141   K 3.3* 3.6 3.7    100 101   CO2 24 30* 33*   GLU 94 116* 104   BUN 35* 26* 14   CREATININE 5.0* 2.4* 1.5*   CALCIUM 8.4* 9.2 9.3   PROT 8.2 8.1 8.2   ALBUMIN 3.3* 3.2* 3.2*   BILITOT 2.7* 2.6* 2.5*   ALKPHOS 86 81 80   AST 39 40 41*   ALT 22 23 23   ANIONGAP 11 7* 7*   ESTGFRAFRICA 15* 37* >60   EGFRNONAA 13* 32* 56*    and All labs within the past 24 hours have been reviewed    Radiology: MRI: AVN of left hip with femoral neck collapse  Ultrasound: LE negative for DVT  Nuclear Medicine: VQ scan negative for PE    Pending Diagnostic Studies:     None         Medications:  Reconciled Home Medications:      Medication List      STOP taking these medications    carvedilol 6.25 MG tablet  Commonly known as:  COREG     cloNIDine 0.1 MG tablet  Commonly known as:  CATAPRES     lisinopril-hydrochlorothiazide 20-25 mg Tab  Commonly known as:  PRINZIDE,ZESTORETIC     methocarbamol 500 MG Tab  Commonly known as:  ROBAXIN            Indwelling Lines/Drains at time of discharge:   Lines/Drains/Airways          None          Time spent on the discharge of patient: 35 minutes  Patient was seen and examined on the date of discharge and determined to be suitable for discharge.         Mila Kinney MD  Department of Hospital Medicine  [facility]

## 2019-05-13 NOTE — ASSESSMENT & PLAN NOTE
· Creatinine 6.1 on admit, baseline around 1.  Trended down to 1.5  · Likely pre-renal from dehydration and volume losses with intra-renal from hypotension from Lisinopril/HCTZ  · Renal US with no acute pathology  · Strict I&Os  · IVF with LR  · Avoid Nephrotoxic agents  · Nephro consulted, appreciate assistance

## 2019-05-13 NOTE — SUBJECTIVE & OBJECTIVE
Interval History: Admitted to Hospital Medicine overnight.  Had nausea and vomiting that responded to Phenergan.  Creatinine trended down this morning.  Continues to have good UOP    Review of Systems   Constitutional: Negative for chills, fatigue and fever.   Respiratory: Negative for cough and shortness of breath.    Cardiovascular: Negative for chest pain, palpitations and leg swelling.   Gastrointestinal: Negative for abdominal pain, diarrhea, nausea and vomiting.   Genitourinary: Negative for dysuria and urgency.   Musculoskeletal: Positive for arthralgias (Hip pain) and gait problem.   Neurological: Negative for dizziness and headaches.   All other systems reviewed and are negative.    Objective:     Vital Signs (Most Recent):  Temp: 98.2 °F (36.8 °C) (05/13/19 0744)  Pulse: 72 (05/13/19 0744)  Resp: 17 (05/13/19 0744)  BP: 136/78 (05/13/19 0744)  SpO2: 98 % (05/13/19 0744) Vital Signs (24h Range):  Temp:  [98.2 °F (36.8 °C)-99.7 °F (37.6 °C)] 98.2 °F (36.8 °C)  Pulse:  [59-77] 72  Resp:  [16-19] 17  SpO2:  [94 %-100 %] 98 %  BP: (134-153)/(64-82) 136/78     Weight: 81.8 kg (180 lb 5.4 oz)  Body mass index is 24.46 kg/m².    Intake/Output Summary (Last 24 hours) at 5/13/2019 1300  Last data filed at 5/13/2019 0500  Gross per 24 hour   Intake 573.33 ml   Output 600 ml   Net -26.67 ml      Physical Exam   Constitutional: He is oriented to person, place, and time. He appears well-developed and well-nourished.   HENT:   Head: Normocephalic and atraumatic.   Cardiovascular: Normal rate and regular rhythm.   No murmur heard.  Pulmonary/Chest: Effort normal and breath sounds normal. No respiratory distress. He has no wheezes. He has no rales.   Abdominal: Soft. He exhibits no distension. There is no tenderness.   Musculoskeletal: He exhibits no edema or deformity.   Neurological: He is alert and oriented to person, place, and time.   Skin: Skin is warm and dry. He is not diaphoretic.       Significant Labs:   CBC:    Recent Labs   Lab 05/11/19  1541 05/12/19  0537 05/13/19  0433   WBC 8.79 7.81 7.69   HGB 9.7* 10.4* 10.5*   HCT 29.3* 31.3* 31.8*   * 147* 135*     CMP:   Recent Labs   Lab 05/11/19  1541 05/12/19  0537 05/13/19  0433   * 137 141   K 3.3* 3.6 3.7    100 101   CO2 24 30* 33*   GLU 94 116* 104   BUN 35* 26* 14   CREATININE 5.0* 2.4* 1.5*   CALCIUM 8.4* 9.2 9.3   PROT 8.2 8.1 8.2   ALBUMIN 3.3* 3.2* 3.2*   BILITOT 2.7* 2.6* 2.5*   ALKPHOS 86 81 80   AST 39 40 41*   ALT 22 23 23   ANIONGAP 11 7* 7*   EGFRNONAA 13* 32* 56*     All pertinent labs within the past 24 hours have been reviewed.    Significant Imaging: U/S: I have reviewed all pertinent results/findings within the past 24 hours and my personal findings are:  No DVT

## 2019-05-13 NOTE — PROGRESS NOTES
Nephrology    Consult Requested By: Mila Kinney MD  Reason for Consult: ENA    SUBJECTIVE:     History of Present Illness:  Patient is a 43 y.o. male presents with weakness found to be severely hypotensive by EMS and upon eval in ED Cr noted to be 6.1 with baseline a few days ago .of 1.12. He unfortunately had taken his BP meds this am which include lisinopril-HCTZ, Metorprol and clonidine. Thus far he has been given  2L NS in ED with some improvement in BP and minimal urineoutput of 25cc. He was recently admitted to [facility] with Dx of PE and early cirrhosis due to ETOH.    Interval History:    Feels better today and ready to go home.  Discussed with Dr. Kinney.  UOP stable and Creat markedly improved.  No CP/SOB.      Review of patient's allergies indicates:  No Known Allergies     Review of Systems:  Constitutional: No fever or chills, no weight changes.  Eyes: No visual changes or photophobia  HEENT: No nasal congestion or sore throat  Respiratory: No cough or shortness of breath  Cardiovascular: No chest pain or palpitations  Gastrointestinal: Good appetite, no nausea or vomiting, no change in bowel habits  Genitourinary: No hematuria or dysuria  Skin: No rash or pruritis  Hematologic/lymphatic: No easy bruising, bleeding or lymphadenopathy  Musculoskeletal: No arthralgias or myalgias  Neurological: No seizures or tremors  Endocrine: No heat/cold intolerance.  No polyuria/polydipsia.  Psychiatric:  No depression or anxiety.     OBJECTIVE:     Vital Signs (Most Recent)  Temp: 98.2 °F (36.8 °C) (05/13/19 0744)  Pulse: 72 (05/13/19 0744)  Resp: 17 (05/13/19 0744)  BP: 136/78 (05/13/19 0744)  SpO2: 98 % (05/13/19 0744)    Vital Signs Range (Last 24H):  Temp:  [98.2 °F (36.8 °C)-99.7 °F (37.6 °C)]   Pulse:  [59-77]   Resp:  [16-19]   BP: (134-153)/(64-82)   SpO2:  [94 %-100 %]     Physical Exam:    General appearance: Well developed, well nourished  Head: Normocephalic, atraumatic  Eyes:  Conjunctivae nl. Sclera  anicteric. PERRL.  HEENT: Lips, mucosa, and tongue normal; teeth and gums normal and oropharynx clear.  Neck: Supple, trachea midline, thyroid not enlarged,   Lungs: Clear to auscultation bilaterally and normal respiratory effort  Heart: Regular rate and rhythm, S1, S2 normal, no murmur, click, rub or gallop  Abdomen: Soft, non-tender non-distended; bowel sounds normal; no masses,  no organomegaly  Extremities: No cyanosis or clubbing. No edema  Pulses: 2+ and symmetric  Skin: Skin color, texture, turgor normal. No rashes or lesions  Lymph nodes: Cervical, supraclavicular, and axillary nodes normal.  Neurologic: Normal strength and tone. No focal numbness or weakness  Psychiatric:  Alert and oriented times 3.  Affect appropriate.     Diagnostic Results:  Labs: Reviewed    ASSESSMENT/PLAN:     1. Resolved ENA/ Hypokalemia  -Baseline Cr 1.1-1.2 on 5/6/19   - Appears was in pre-renal /early ATN   -did well with volume replacement.    -K normalized.  -Renal US normal.  -Cr down from 6.0 to 1.5 today  -Renally dose meds, avoid nephrotoxins, and monitor I/O's closely.      2. Hypotension  -unfortunately he took his home meds on day of admission.  -Now held  -Continue IVFs.  -stable and would not restart meds at this time.   -see PCP and restart as needed.     3. Anemia with Hx of GIB in past/ Thrombocytopenia/ Coagulopathy  -stable.  -defer.    4. ETOH Cirrhosis  -pending w/u at [facility]    5. 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   -defer.      6. Hx of PE  -Has not taken Coumadin as Rx'd for 6 mos due to had GIB and on Advil.  -VC scan this am and low prob.    - dopplers done and are neg   -defer to primary        Ok to DC from renal

## 2019-05-13 NOTE — ASSESSMENT & PLAN NOTE
· Diagnosed with PE Oct 2018 and was started on Warfarin for increased bleeding risk because of liver disease and varices  · Hasn't been on treatment  · LE US negative for treatment  · VQ scan ordered to evaluate PE which was negative  · Unable to get CTA with contrast given ARF

## 2019-05-13 NOTE — PLAN OF CARE
05/13/2019      Irvin Diaz  [address] [address]          Hospital Medicine Dept.  [facility] [address] [place] [id number] [phone] after hours  [phone] fax Irvin Diaz has been hospitalized at the [facility] since 5/11/2019.  Please excuse the patient from duties.  Patient may return on 5/14/19.  No weight bearing on the LLE for 2 weeks.    Please contact me if you have any questions.                  __________________________  Mila Kinney MD  05/13/2019

## 2019-05-13 NOTE — ASSESSMENT & PLAN NOTE
MELD-Na score: 18 at 5/13/2019  4:33 AM  MELD score: 18 at 5/13/2019  4:33 AM  Calculated from:  Serum Creatinine: 1.5 mg/dL at 5/13/2019  4:33 AM  Serum Sodium: 141 mmol/L (Rounded to 137 mmol/L) at 5/13/2019  4:33 AM  Total Bilirubin: 2.5 mg/dL at 5/13/2019  4:33 AM  INR(ratio): 1.4 at 5/13/2019  4:33 AM  Age: 43 years  · Worsening MELD 2/2 renal failure  · IVF as above  · Check Hep panel  · Will need referral to Hepatology outpatient

## 2019-05-13 NOTE — PLAN OF CARE
MD states pt cleared for DC after being seen by PT.    Spoke with Laney in physical therapy.  She will eval pt and contact SW with DME needs.

## 2019-05-13 NOTE — PROGRESS NOTES
[facility] Hospital Medicine  Progress Note    Patient Name: Irvin Diaz  MRN: [id number]  Patient Class: IP- Inpatient   Admission Date: 5/11/2019  Length of Stay: 2 days  Attending Physician: Mila Kinney MD  Primary Care Provider: Primary Doctor No        Subjective:     Principal Problem:Acute renal failure with tubular necrosis    HPI:  Mr. Irvin Diaz is a 42yo male with alcoholic cirrhosis, complicated by esophageal varices s/p banding for hematemesis, thrombocytopenia, and coagulopathy, and history of pulmonary embolism (diagnosed Oct 2018, not on anticoagulation because of high risk for bleeding), who presents to the emergency department after he had 2 syncopal episodes at work.  He mentions that he started to feel poorly on 5/8 with some nausea without hematemesis.  He underwent an EGD 5/9 to evaluate his varices, which he was told were stable.  Since then, he has continued to feel nauseous with weakness and decreased p.o. intake.  He went to work the day of admission, and had a syncopal episode, and then had another.  He denies any alcohol intake the last few weeks.  He denies any chest pain or shortness of breath or LE swelling.  He claims to be compliant all of his medications.   He claims that he is not on any anticoagulation because of his varices with hematemesis.  Of note, he recently had a fall with his dog, landing on his left hip.  Since then, he has been ambulating with a limp.  He was told at OSH that he had a fracture.    In the emergency department, he was found to have a blood pressure of 93/53 on arrival, but was reported by EMS to have a systolic blood pressure in the 60s.  Labs were notable for a creatinine of 6.1 up from a baseline of 1.  He was given IV fluids with improvement in his creatinine down to 5 while in the emergency department.  Nephrology was consulted.  He was admitted to Hospital Medicine for further management.    Hospital Course:    Joe was admitted to Hospital Medicine for management of his acute renal failure.  Creatinine was 6 on admit that trended down with IVF.  Nephro was consulted.  Renal US was normal.  LE US was negative for DVT.  MRI of the hip showed an effusion and AVN with femoral neck collapse.  Ortho was consulted, who suggest NWB to LLE, as he is currently high risk for IVETH.  PT was consulted for DME recs.    Interval History: Admitted to Hospital Medicine overnight.  Had nausea and vomiting that responded to Phenergan.  Creatinine trended down this morning.  Continues to have good UOP    Review of Systems   Constitutional: Negative for chills, fatigue and fever.   Respiratory: Negative for cough and shortness of breath.    Cardiovascular: Negative for chest pain, palpitations and leg swelling.   Gastrointestinal: Negative for abdominal pain, diarrhea, nausea and vomiting.   Genitourinary: Negative for dysuria and urgency.   Musculoskeletal: Positive for arthralgias (Hip pain) and gait problem.   Neurological: Negative for dizziness and headaches.   All other systems reviewed and are negative.    Objective:     Vital Signs (Most Recent):  Temp: 98.2 °F (36.8 °C) (05/13/19 0744)  Pulse: 72 (05/13/19 0744)  Resp: 17 (05/13/19 0744)  BP: 136/78 (05/13/19 0744)  SpO2: 98 % (05/13/19 0744) Vital Signs (24h Range):  Temp:  [98.2 °F (36.8 °C)-99.7 °F (37.6 °C)] 98.2 °F (36.8 °C)  Pulse:  [59-77] 72  Resp:  [16-19] 17  SpO2:  [94 %-100 %] 98 %  BP: (134-153)/(64-82) 136/78     Weight: 81.8 kg (180 lb 5.4 oz)  Body mass index is 24.46 kg/m².    Intake/Output Summary (Last 24 hours) at 5/13/2019 1300  Last data filed at 5/13/2019 0500  Gross per 24 hour   Intake 573.33 ml   Output 600 ml   Net -26.67 ml      Physical Exam   Constitutional: He is oriented to person, place, and time. He appears well-developed and well-nourished.   HENT:   Head: Normocephalic and atraumatic.   Cardiovascular: Normal rate and regular rhythm.   No murmur  heard.  Pulmonary/Chest: Effort normal and breath sounds normal. No respiratory distress. He has no wheezes. He has no rales.   Abdominal: Soft. He exhibits no distension. There is no tenderness.   Musculoskeletal: He exhibits no edema or deformity.   Neurological: He is alert and oriented to person, place, and time.   Skin: Skin is warm and dry. He is not diaphoretic.       Significant Labs:   CBC:   Recent Labs   Lab 05/11/19  1541 05/12/19  0537 05/13/19  0433   WBC 8.79 7.81 7.69   HGB 9.7* 10.4* 10.5*   HCT 29.3* 31.3* 31.8*   * 147* 135*     CMP:   Recent Labs   Lab 05/11/19  1541 05/12/19  0537 05/13/19  0433   * 137 141   K 3.3* 3.6 3.7    100 101   CO2 24 30* 33*   GLU 94 116* 104   BUN 35* 26* 14   CREATININE 5.0* 2.4* 1.5*   CALCIUM 8.4* 9.2 9.3   PROT 8.2 8.1 8.2   ALBUMIN 3.3* 3.2* 3.2*   BILITOT 2.7* 2.6* 2.5*   ALKPHOS 86 81 80   AST 39 40 41*   ALT 22 23 23   ANIONGAP 11 7* 7*   EGFRNONAA 13* 32* 56*     All pertinent labs within the past 24 hours have been reviewed.    Significant Imaging: U/S: I have reviewed all pertinent results/findings within the past 24 hours and my personal findings are:  No DVT    Assessment/Plan:      * Acute renal failure with tubular necrosis  · Creatinine 6.1 on admit, baseline around 1.  Trended down to 1.5  · Likely pre-renal from dehydration and volume losses with intra-renal from hypotension from Lisinopril/HCTZ  · Renal US with no acute pathology  · Strict I&Os  · IVF with LR  · Avoid Nephrotoxic agents  · Nephro consulted, appreciate assistance      History of pulmonary embolus (PE)  · Diagnosed with PE Oct 2018 and was started on Warfarin for increased bleeding risk because of liver disease and varices  · Hasn't been on treatment  · LE US negative for treatment  · VQ scan ordered to evaluate PE which was negative  · Unable to get CTA with contrast given ARF    AVN (avascular necrosis of bone)  · Reports he had a fall with his dog, landing on his  left hip  · Has been ambulating with a limp  · OSH CT scan with reported fracture (unable to be seen on Epic or Care Everywhere) but XR here with AVN  · MRI with AVN with collapse of the femoral neck, and hip effusion  · Ortho says NWB to LLE - high risk for IVETH  · PT eval pending for DME  · Needs Ortho f/u outpatient    Esophageal varices without bleeding  · Serial EGD on 5/9 with no acute issues  · Monitor for bleeding      Anemia  · Hgb 9.7, around baseline.  Currently 10.5  · Likely to drop with IVF  · Monitor      Thrombocytopenia  · Plt chronically low 2/2 liver disease  · Monitor for bleeding      Coagulopathy  · 2/2 liver disease  · Monitor      Alcoholic cirrhosis  MELD-Na score: 18 at 5/13/2019  4:33 AM  MELD score: 18 at 5/13/2019  4:33 AM  Calculated from:  Serum Creatinine: 1.5 mg/dL at 5/13/2019  4:33 AM  Serum Sodium: 141 mmol/L (Rounded to 137 mmol/L) at 5/13/2019  4:33 AM  Total Bilirubin: 2.5 mg/dL at 5/13/2019  4:33 AM  INR(ratio): 1.4 at 5/13/2019  4:33 AM  Age: 43 years  · Worsening MELD 2/2 renal failure  · IVF as above  · Check Hep panel  · Will need referral to Hepatology outpatient        VTE Risk Mitigation (From admission, onward)        Ordered     IP VTE LOW RISK PATIENT  Once      05/11/19 1625     Place sequential compression device  Until discontinued      05/11/19 1625          Home tonight with DME      Mila Kinney MD  Department of Hospital Medicine   [facility]

## 2019-05-14 NOTE — PLAN OF CARE
05/14/19 0844   Final Note   Assessment Type Final Discharge Note   Anticipated Discharge Disposition Home   Hospital Follow Up  Appt(s) scheduled? Yes   Discharge plans and expectations educations in teach back method with documentation complete? Yes   Right Care Referral Info   Post Acute Recommendation No Care

## 2019-05-16 LAB
BACTERIA BLD CULT: NORMAL
BACTERIA BLD CULT: NORMAL

## 2019-07-25 ENCOUNTER — HOSPITAL ENCOUNTER (INPATIENT)
Facility: OTHER | Age: 44
LOS: 2 days | Discharge: HOME | DRG: 378 | End: 2019-07-27
Attending: EMERGENCY MEDICINE | Admitting: INTERNAL MEDICINE
Payer: MEDICAID

## 2019-07-25 DIAGNOSIS — K70.30 ALCOHOLIC CIRRHOSIS OF LIVER WITHOUT ASCITES: ICD-10-CM

## 2019-07-25 DIAGNOSIS — K76.6 PORTAL HYPERTENSION: ICD-10-CM

## 2019-07-25 DIAGNOSIS — E87.6 HYPOKALEMIA: ICD-10-CM

## 2019-07-25 DIAGNOSIS — I85.01 BLEEDING ESOPHAGEAL VARICES, UNSPECIFIED ESOPHAGEAL VARICES TYPE: ICD-10-CM

## 2019-07-25 DIAGNOSIS — K92.2 UPPER GI BLEED: Primary | ICD-10-CM

## 2019-07-25 PROBLEM — N13.30 HYDRONEPHROSIS OF RIGHT KIDNEY: Status: ACTIVE | Noted: 2019-07-25

## 2019-07-25 PROBLEM — R79.89 ELEVATED LFTS: Status: ACTIVE | Noted: 2019-07-25

## 2019-07-25 PROBLEM — I10 BENIGN ESSENTIAL HTN: Status: ACTIVE | Noted: 2019-07-25

## 2019-07-25 LAB
ABO + RH BLD: NORMAL
ALBUMIN SERPL BCP-MCNC: 3.7 G/DL (ref 3.5–5.2)
ALP SERPL-CCNC: 128 U/L (ref 55–135)
ALT SERPL W/O P-5'-P-CCNC: 98 U/L (ref 10–44)
ANION GAP SERPL CALC-SCNC: 14 MMOL/L (ref 8–16)
AST SERPL-CCNC: 274 U/L (ref 10–40)
BASOPHILS # BLD AUTO: 0.06 K/UL (ref 0–0.2)
BASOPHILS NFR BLD: 0.8 % (ref 0–1.9)
BILIRUB SERPL-MCNC: 7.7 MG/DL (ref 0.1–1)
BILIRUB UR QL STRIP: NEGATIVE
BLD GP AB SCN CELLS X3 SERPL QL: NORMAL
BUN SERPL-MCNC: 7 MG/DL (ref 6–20)
CALCIUM SERPL-MCNC: 9.7 MG/DL (ref 8.7–10.5)
CHLORIDE SERPL-SCNC: 96 MMOL/L (ref 95–110)
CLARITY UR: CLEAR
CO2 SERPL-SCNC: 28 MMOL/L (ref 23–29)
COLOR UR: YELLOW
CREAT SERPL-MCNC: 1.1 MG/DL (ref 0.5–1.4)
DIFFERENTIAL METHOD: ABNORMAL
EOSINOPHIL # BLD AUTO: 0 K/UL (ref 0–0.5)
EOSINOPHIL NFR BLD: 0.4 % (ref 0–8)
ERYTHROCYTE [DISTWIDTH] IN BLOOD BY AUTOMATED COUNT: 16.2 % (ref 11.5–14.5)
EST. GFR  (AFRICAN AMERICAN): >60 ML/MIN/1.73 M^2
EST. GFR  (NON AFRICAN AMERICAN): >60 ML/MIN/1.73 M^2
GLUCOSE SERPL-MCNC: 101 MG/DL (ref 70–110)
GLUCOSE UR QL STRIP: NEGATIVE
HCT VFR BLD AUTO: 28.8 % (ref 40–54)
HCT VFR BLD AUTO: 30.1 % (ref 40–54)
HGB BLD-MCNC: 10 G/DL (ref 14–18)
HGB BLD-MCNC: 10.4 G/DL (ref 14–18)
HGB BLD-MCNC: 9.5 G/DL (ref 14–18)
HGB UR QL STRIP: NEGATIVE
IMM GRANULOCYTES # BLD AUTO: 0.03 K/UL (ref 0–0.04)
IMM GRANULOCYTES NFR BLD AUTO: 0.4 % (ref 0–0.5)
INR PPP: 1.2 (ref 0.8–1.2)
KETONES UR QL STRIP: NEGATIVE
LEUKOCYTE ESTERASE UR QL STRIP: NEGATIVE
LIPASE SERPL-CCNC: 124 U/L (ref 4–60)
LYMPHOCYTES # BLD AUTO: 1.5 K/UL (ref 1–4.8)
LYMPHOCYTES NFR BLD: 19.3 % (ref 18–48)
MCH RBC QN AUTO: 36.2 PG (ref 27–31)
MCHC RBC AUTO-ENTMCNC: 34.6 G/DL (ref 32–36)
MCV RBC AUTO: 105 FL (ref 82–98)
MONOCYTES # BLD AUTO: 0.7 K/UL (ref 0.3–1)
MONOCYTES NFR BLD: 9.3 % (ref 4–15)
NEUTROPHILS # BLD AUTO: 5.5 K/UL (ref 1.8–7.7)
NEUTROPHILS NFR BLD: 69.8 % (ref 38–73)
NITRITE UR QL STRIP: NEGATIVE
NRBC BLD-RTO: 0 /100 WBC
PH UR STRIP: 7 [PH] (ref 5–8)
PLATELET # BLD AUTO: 77 K/UL (ref 150–350)
PMV BLD AUTO: 12 FL (ref 9.2–12.9)
POTASSIUM SERPL-SCNC: 2.9 MMOL/L (ref 3.5–5.1)
PROT SERPL-MCNC: 10.3 G/DL (ref 6–8.4)
PROT UR QL STRIP: NEGATIVE
PROTHROMBIN TIME: 13.4 SEC (ref 9–12.5)
RBC # BLD AUTO: 2.87 M/UL (ref 4.6–6.2)
SODIUM SERPL-SCNC: 138 MMOL/L (ref 136–145)
SP GR UR STRIP: <=1.005 (ref 1–1.03)
URN SPEC COLLECT METH UR: ABNORMAL
UROBILINOGEN UR STRIP-ACNC: NEGATIVE EU/DL
WBC # BLD AUTO: 7.86 K/UL (ref 3.9–12.7)

## 2019-07-25 PROCEDURE — 63600175 PHARM REV CODE 636 W HCPCS: Performed by: PHYSICIAN ASSISTANT

## 2019-07-25 PROCEDURE — 25500020 PHARM REV CODE 255: Performed by: EMERGENCY MEDICINE

## 2019-07-25 PROCEDURE — 80053 COMPREHEN METABOLIC PANEL: CPT

## 2019-07-25 PROCEDURE — 63600175 PHARM REV CODE 636 W HCPCS: Performed by: INTERNAL MEDICINE

## 2019-07-25 PROCEDURE — 85018 HEMOGLOBIN: CPT

## 2019-07-25 PROCEDURE — 25000003 PHARM REV CODE 250: Performed by: PHYSICIAN ASSISTANT

## 2019-07-25 PROCEDURE — 85025 COMPLETE CBC W/AUTO DIFF WBC: CPT

## 2019-07-25 PROCEDURE — 20000000 HC ICU ROOM

## 2019-07-25 PROCEDURE — C9113 INJ PANTOPRAZOLE SODIUM, VIA: HCPCS | Performed by: INTERNAL MEDICINE

## 2019-07-25 PROCEDURE — 96361 HYDRATE IV INFUSION ADD-ON: CPT

## 2019-07-25 PROCEDURE — 96375 TX/PRO/DX INJ NEW DRUG ADDON: CPT

## 2019-07-25 PROCEDURE — 25000003 PHARM REV CODE 250: Performed by: INTERNAL MEDICINE

## 2019-07-25 PROCEDURE — 86850 RBC ANTIBODY SCREEN: CPT

## 2019-07-25 PROCEDURE — 99291 PR CRITICAL CARE, E/M 30-74 MINUTES: ICD-10-PCS | Mod: ,,, | Performed by: INTERNAL MEDICINE

## 2019-07-25 PROCEDURE — 94761 N-INVAS EAR/PLS OXIMETRY MLT: CPT

## 2019-07-25 PROCEDURE — 85014 HEMATOCRIT: CPT

## 2019-07-25 PROCEDURE — C9113 INJ PANTOPRAZOLE SODIUM, VIA: HCPCS | Performed by: PHYSICIAN ASSISTANT

## 2019-07-25 PROCEDURE — 96365 THER/PROPH/DIAG IV INF INIT: CPT

## 2019-07-25 PROCEDURE — 36415 COLL VENOUS BLD VENIPUNCTURE: CPT

## 2019-07-25 PROCEDURE — 83690 ASSAY OF LIPASE: CPT

## 2019-07-25 PROCEDURE — 99285 EMERGENCY DEPT VISIT HI MDM: CPT | Mod: 25

## 2019-07-25 PROCEDURE — 99291 CRITICAL CARE FIRST HOUR: CPT | Mod: ,,, | Performed by: INTERNAL MEDICINE

## 2019-07-25 PROCEDURE — 85018 HEMOGLOBIN: CPT | Mod: 91

## 2019-07-25 PROCEDURE — 85610 PROTHROMBIN TIME: CPT

## 2019-07-25 PROCEDURE — 81003 URINALYSIS AUTO W/O SCOPE: CPT

## 2019-07-25 RX ORDER — ALPRAZOLAM 0.5 MG/1
0.5 TABLET ORAL ONCE
Status: COMPLETED | OUTPATIENT
Start: 2019-07-25 | End: 2019-07-25

## 2019-07-25 RX ORDER — SODIUM CHLORIDE 9 MG/ML
INJECTION, SOLUTION INTRAVENOUS CONTINUOUS
Status: DISCONTINUED | OUTPATIENT
Start: 2019-07-25 | End: 2019-07-26

## 2019-07-25 RX ORDER — ALPRAZOLAM 0.25 MG/1
0.25 TABLET ORAL 3 TIMES DAILY PRN
Status: DISCONTINUED | OUTPATIENT
Start: 2019-07-25 | End: 2019-07-27 | Stop reason: HOSPADM

## 2019-07-25 RX ORDER — SODIUM CHLORIDE 9 MG/ML
1000 INJECTION, SOLUTION INTRAVENOUS
Status: COMPLETED | OUTPATIENT
Start: 2019-07-25 | End: 2019-07-25

## 2019-07-25 RX ORDER — SODIUM CHLORIDE 9 MG/ML
INJECTION, SOLUTION INTRAVENOUS CONTINUOUS
Status: DISCONTINUED | OUTPATIENT
Start: 2019-07-25 | End: 2019-07-25

## 2019-07-25 RX ORDER — POTASSIUM CHLORIDE 7.45 MG/ML
10 INJECTION INTRAVENOUS ONCE
Status: COMPLETED | OUTPATIENT
Start: 2019-07-25 | End: 2019-07-25

## 2019-07-25 RX ORDER — LISINOPRIL 10 MG/1
10 TABLET ORAL DAILY
COMMUNITY
End: 2019-09-23

## 2019-07-25 RX ORDER — PANTOPRAZOLE SODIUM 40 MG/10ML
INJECTION, POWDER, LYOPHILIZED, FOR SOLUTION INTRAVENOUS
Status: DISPENSED
Start: 2019-07-25 | End: 2019-07-25

## 2019-07-25 RX ORDER — LISINOPRIL 10 MG/1
10 TABLET ORAL
Status: COMPLETED | OUTPATIENT
Start: 2019-07-25 | End: 2019-07-25

## 2019-07-25 RX ORDER — SODIUM CHLORIDE 0.9 % (FLUSH) 0.9 %
10 SYRINGE (ML) INJECTION
Status: DISCONTINUED | OUTPATIENT
Start: 2019-07-25 | End: 2019-07-27 | Stop reason: HOSPADM

## 2019-07-25 RX ORDER — ONDANSETRON 2 MG/ML
4 INJECTION INTRAMUSCULAR; INTRAVENOUS EVERY 6 HOURS PRN
Status: DISCONTINUED | OUTPATIENT
Start: 2019-07-25 | End: 2019-07-26 | Stop reason: SDUPTHER

## 2019-07-25 RX ORDER — POTASSIUM CHLORIDE 7.45 MG/ML
10 INJECTION INTRAVENOUS
Status: COMPLETED | OUTPATIENT
Start: 2019-07-25 | End: 2019-07-25

## 2019-07-25 RX ORDER — PANTOPRAZOLE SODIUM 40 MG/10ML
80 INJECTION, POWDER, LYOPHILIZED, FOR SOLUTION INTRAVENOUS
Status: COMPLETED | OUTPATIENT
Start: 2019-07-25 | End: 2019-07-25

## 2019-07-25 RX ORDER — DIPHENHYDRAMINE HCL 25 MG
25 CAPSULE ORAL ONCE
Status: COMPLETED | OUTPATIENT
Start: 2019-07-25 | End: 2019-07-25

## 2019-07-25 RX ORDER — ACETAMINOPHEN 325 MG/1
650 TABLET ORAL
Status: COMPLETED | OUTPATIENT
Start: 2019-07-25 | End: 2019-07-25

## 2019-07-25 RX ORDER — POTASSIUM CHLORIDE 20 MEQ/1
40 TABLET, EXTENDED RELEASE ORAL ONCE
Status: COMPLETED | OUTPATIENT
Start: 2019-07-25 | End: 2019-07-25

## 2019-07-25 RX ORDER — LISINOPRIL 10 MG/1
10 TABLET ORAL DAILY
Status: DISCONTINUED | OUTPATIENT
Start: 2019-07-26 | End: 2019-07-27 | Stop reason: HOSPADM

## 2019-07-25 RX ORDER — ACETAMINOPHEN 325 MG/1
650 TABLET ORAL EVERY 8 HOURS PRN
Status: DISCONTINUED | OUTPATIENT
Start: 2019-07-25 | End: 2019-07-27 | Stop reason: HOSPADM

## 2019-07-25 RX ADMIN — ONDANSETRON 4 MG: 2 INJECTION INTRAMUSCULAR; INTRAVENOUS at 05:07

## 2019-07-25 RX ADMIN — IOHEXOL 75 ML: 350 INJECTION, SOLUTION INTRAVENOUS at 11:07

## 2019-07-25 RX ADMIN — DIPHENHYDRAMINE HYDROCHLORIDE 25 MG: 25 CAPSULE ORAL at 09:07

## 2019-07-25 RX ADMIN — SODIUM CHLORIDE: 0.9 INJECTION, SOLUTION INTRAVENOUS at 04:07

## 2019-07-25 RX ADMIN — POTASSIUM CHLORIDE 40 MEQ: 20 TABLET, EXTENDED RELEASE ORAL at 05:07

## 2019-07-25 RX ADMIN — OCTREOTIDE ACETATE 50 MCG/HR: 500 INJECTION, SOLUTION INTRAVENOUS; SUBCUTANEOUS at 10:07

## 2019-07-25 RX ADMIN — DEXTROSE 8 MG/HR: 50 INJECTION, SOLUTION INTRAVENOUS at 09:07

## 2019-07-25 RX ADMIN — ACETAMINOPHEN 650 MG: 325 TABLET, FILM COATED ORAL at 11:07

## 2019-07-25 RX ADMIN — PANTOPRAZOLE SODIUM 80 MG: 40 INJECTION, POWDER, FOR SOLUTION INTRAVENOUS at 11:07

## 2019-07-25 RX ADMIN — SODIUM CHLORIDE: 0.9 INJECTION, SOLUTION INTRAVENOUS at 09:07

## 2019-07-25 RX ADMIN — LISINOPRIL 10 MG: 10 TABLET ORAL at 01:07

## 2019-07-25 RX ADMIN — SODIUM CHLORIDE 1000 ML: 0.9 INJECTION, SOLUTION INTRAVENOUS at 11:07

## 2019-07-25 RX ADMIN — POTASSIUM CHLORIDE 10 MEQ: 10 INJECTION, SOLUTION INTRAVENOUS at 12:07

## 2019-07-25 RX ADMIN — ALPRAZOLAM 0.5 MG: 0.5 TABLET ORAL at 11:07

## 2019-07-25 RX ADMIN — ALPRAZOLAM 0.25 MG: 0.25 TABLET ORAL at 05:07

## 2019-07-25 RX ADMIN — POTASSIUM CHLORIDE 10 MEQ: 10 INJECTION, SOLUTION INTRAVENOUS at 01:07

## 2019-07-25 RX ADMIN — DEXTROSE 8 MG/HR: 50 INJECTION, SOLUTION INTRAVENOUS at 04:07

## 2019-07-25 RX ADMIN — OCTREOTIDE ACETATE 50 MCG/HR: 500 INJECTION, SOLUTION INTRAVENOUS; SUBCUTANEOUS at 03:07

## 2019-07-25 NOTE — ED NOTES
Appearance: Pt awake, alert & oriented to person, place & time. Pt in no acute distress at present time. Pt is clean and well groomed with clothes appropriately fastened.   Skin: Skin warm, dry & intact. Color consistent with ethnicity. Mucous membranes moist. No breakdown or brusing noted.   Musculoskeletal: Patient moving all extremities well, no obvious swelling or deformities noted.   Respiratory: Respirations spontaneous, even, and non-labored. Visible chest rise noted. Airway is open and patent. No accessory muscle use noted.   Neurologic: Sensation is intact. Speech is clear and appropriate. Eyes open spontaneously, behavior appropriate to situation, follows commands, facial expression symmetrical, bilateral hand grasp equal and even, purposeful motor response noted.  Cardiac: All peripheral pulses present. No Bilateral lower extremity edema. Cap refill is <3 seconds. Pt denies active chest pains, SOB, dizziness, blurred vision.   Abdomen: Abdomen soft, tenderness reported to LUQ abd and LLQ abd pain with reports of coffee ground emesis x 2 episodes. Denies blood in stool, last BM was yesterday and pt reporting it was adequate.   : Pt reports no dysuria or hematuria.

## 2019-07-25 NOTE — H&P
[facility] Hospital Medicine  History & Physical    Patient Name: Irvin Diaz  MRN: [id number]  Admission Date: 7/25/2019  Attending Physician: Kathy Hill MD   Primary Care Provider: Primary Doctor No         Patient information was obtained from patient, past medical records and ER records.     Subjective:     Principal Problem:Upper GI bleed    Chief Complaint:   Chief Complaint   Patient presents with    Hematemesis     Pt reports coffee ground emesis X 1 last night.Report LUQ abdominal pain & nausea. Reports past ETOH related GI bleed. Last ETOH use October.         HPI: 43 year old male with past medical history of alcoholic cirrhosis, htn, thrombocytopenia, esophageal varices with last banding in 10/18 per patient, pulmonary embolism in October 2018 for which he was on anticoagulation but was taken off within 3 months due to the risk of bleeding with esophageal viruses,   came in due to 2 episodes of coffee ground emesis.  He had 1 episode of hematemesis last night and another 1 this morning.  He denied seeing any bright red blood.  He has not had a bowel movement since yesterday and denies melena in the stools.  The patient has not had any further episodes since he has been in the hospital.  He denies drinking alcohol since last year and has not been on NSAIDs since 1 of his GI bleed was attributed to NSAID use.  He also complains of decreased appetite for the last few months as well as weight loss of 20 lb in the last 2-3  Months.  He complains of some left upper abdominal discomfort as well as epigastric discomfort, no back pain, no dysuria or urinary difficulties.  His H&H was stable on admit.  He was started on PPI and octreotide drip and GI was consulted.    Past Medical History:   Diagnosis Date    Alcoholic cirrhosis of liver     Arthritis     Hypertension     Pulmonary embolism        Past Surgical History:   Procedure Laterality Date    COLON SURGERY      HERNIA  REPAIR         Review of patient's allergies indicates:   Allergen Reactions    Morphine Itching       No current facility-administered medications on file prior to encounter.      Current Outpatient Medications on File Prior to Encounter   Medication Sig    lisinopril 10 MG tablet Take 10 mg by mouth once daily.     Family History     None        Tobacco Use    Smoking status: Never Smoker   Substance and Sexual Activity    Alcohol use: Not Currently     Comment: mary    Drug use: Yes     Types: Marijuana    Sexual activity: Not on file     Comment: occ     Review of Systems   Constitutional: Positive for appetite change and unexpected weight change. Negative for chills and fever.   HENT: Negative for trouble swallowing.    Respiratory: Negative for cough and shortness of breath.    Cardiovascular: Negative for chest pain and leg swelling.   Gastrointestinal: Positive for abdominal pain, blood in stool, nausea and vomiting. Negative for diarrhea.   Genitourinary: Negative for dysuria and hematuria.   Musculoskeletal: Positive for arthralgias and gait problem.   Skin: Negative for rash.   Neurological: Negative for headaches.   Psychiatric/Behavioral: Negative for confusion. The patient is nervous/anxious.      Objective:     Vital Signs (Most Recent):  Temp: 98.7 °F (37.1 °C) (07/25/19 1345)  Pulse: 88 (07/25/19 1409)  Resp: 17 (07/25/19 1411)  BP: (!) 160/91 (07/25/19 1406)  SpO2: 98 % (07/25/19 1406) Vital Signs (24h Range):  Temp:  [98.5 °F (36.9 °C)-98.7 °F (37.1 °C)] 98.7 °F (37.1 °C)  Pulse:  [] 88  Resp:  [16-20] 17  SpO2:  [98 %-100 %] 98 %  BP: (160-179)/(80-95) 160/91     Weight: 81.6 kg (180 lb)  Body mass index is 24.41 kg/m².    Physical Exam   Constitutional: He is oriented to person, place, and time. No distress.   anxious   HENT:   Head: Normocephalic and atraumatic.   Eyes: Pupils are equal, round, and reactive to light. EOM are normal.   Neck: Normal range of motion. Neck supple.    Cardiovascular: Regular rhythm.   tachycardic   Pulmonary/Chest: Effort normal and breath sounds normal. No respiratory distress.   Abdominal: Soft. Bowel sounds are normal. He exhibits no distension.   Mild tenderness in left upper quadrant and epigastric area.   Musculoskeletal: Normal range of motion. He exhibits no edema.   Mild hand tremors   Neurological: He is alert and oriented to person, place, and time. No cranial nerve deficit.   Skin: Skin is warm.   On left side of his anal opening, 3 small openings seen which patient says is chronic, no discharge seen currently,? Sinus    Psychiatric: He has a normal mood and affect.   Vitals reviewed.        CRANIAL NERVES     CN III, IV, VI   Pupils are equal, round, and reactive to light.  Extraocular motions are normal.        Significant Labs:   CBC:   Recent Labs   Lab 07/25/19  1053   WBC 7.86   HGB 10.4*   HCT 30.1*   PLT 77*     CMP:   Recent Labs   Lab 07/25/19  1053      K 2.9*   CL 96   CO2 28      BUN 7   CREATININE 1.1   CALCIUM 9.7   PROT 10.3*   ALBUMIN 3.7   BILITOT 7.7*   ALKPHOS 128   *   ALT 98*   ANIONGAP 14   EGFRNONAA >60     Coagulation:   Recent Labs   Lab 07/25/19  1053   INR 1.2       Significant Imaging:   CT Abdomen Pelvis With Contrast  Narrative: EXAMINATION:  CT ABDOMEN PELVIS WITH CONTRAST    CLINICAL HISTORY:  Abdominal distension;GI bleeding;    TECHNIQUE:  Low dose axial images, sagittal and coronal reformations were obtained from the lung bases to the pubic symphysis following the IV administration of 75 mL of Omnipaque 350 .  Oral contrast was not given.    COMPARISON:  None.    FINDINGS:  Images of the lower thorax are remarkable.    The liver is hypoattenuating, may reflect steatosis, correlation with LFTs recommended.  The liver has a somewhat nodular contour, finding is nonspecific.  There is a vague focus of low attenuation adjacent to the gallbladder fossa, could reflect focus of fatty infiltration.   Additionally, ill-defined hypoattenuation is noted along the gallbladder fossa within the posterior aspect of the right hepatic lobe, same differential.  The spleen, pancreas, adrenal glands and gallbladder are grossly unremarkable.  There is no biliary dilation.  Several nonenlarged abdominal lymph nodes are noted.  The portal vein, splenic vein, SMV, celiac axis and SMA all are grossly patent.  Pancreatic duct is not dilated.  Surgical changes are noted of the right upper quadrant.    The kidneys enhance symmetrically without nephrolithiasis.  There is mild right hydronephrosis, no left hydronephrosis.  There is bilateral perinephric fat stranding, right greater than left.  The bilateral ureters are unremarkable without calculi seen.  The urinary bladder is unremarkable.  The prostate is prominent.    There are a few scattered colonic diverticula.  Surgical changes are noted of the right colon.  The appendix is not identified.  No pericecal inflammation.  The small bowel is grossly unremarkable.  No focal organized pelvic fluid collection.  There may be subtle fat stranding adjacent to the mid to distal descending colon in the region of several diverticula versus nonspecific mesenteric edema.  There is atherosclerotic calcification of the aorta and its branches.    Degenerative changes are noted of the spine.  No significant inguinal lymphadenopathy.  There is irregularity, sclerosis, and partial destruction involving the left femoral head, correlation with any history of previous injury versus osteonecrosis/infection recommended.  There is a fat containing umbilical hernia.  Anterior abdominal wall varices noted as well as prominent anterior mesenteric vessels.  Impression: 1. Mild right hydronephrosis noting right perinephric fat stranding greater than left.  Finding is nonspecific however sequela of recently passed calculus or infection are considerations and correlation with urinalysis is recommended.  2.  Findings suggesting hepatic steatosis noting nodular contour to the liver, correlation with any history of cirrhosis recommended.  Prominent venous varices within the anterior abdomen anterior abdominal wall may reflect sequela of portal hypertension.  3. Vague foci of low attenuation about the gallbladder fossa could reflect focal fatty infiltration although nonspecific and suboptimally evaluated.  Nonemergent, outpatient MRI could be performed for further evaluation as warranted to confirm fat deposition.  4. Slight indistinctness about a few diverticula involving the mid to distal descending colon.  This is likely on the basis of scattered strand-like fluid in the region as a generalized process rather than diverticulitis although correlation with any focal tenderness in the region is recommended as early changes of diverticulitis are a consideration.  5. Surgical changes of the colon.  6. Several additional findings above.    Electronically signed by: Brady Hope MD  Date:    07/25/2019  Time:    12:15        Assessment/Plan:     * Upper GI bleed  Could be variceal bleed with h/o cirrhosis and varices  Last egd in 05/19 which showed stable varices, last banding around 10/18  Started on ppi and octreotide drip  Clear liquid diet  Gi consulted , likely plan for egd in am  Monitor h/h  Likely need b blocker outpatient for px for variceal bleeding      Elevated LFTs  Likely due to cirrhosis  Hep panel negative in 05/19  monitor      Hydronephrosis of right kidney  Mild right hydronephrosis on ct scan with perinephric stranding which could be non specific finding  Patient denies flank pain or dysuria or difficulty urinating  ua was negative  Creatinine normal  Will monitor urine output  Check post void residual scan      Benign essential HTN  bp on higher side  Will continue lisinopril and monitor vitals  Coreg listed on his home med lsit but patient is not taking it      AVN (avascular necrosis of bone)  Had avn  of left hip  Has ortho appointment at end of this month  wbat      Anemia  Has chronic anemia which seems stable since last admit  Will monitor h/h and transfuse as needed      Hypokalemia  Replaced 20 meq by iv and 40 meq by mouth   Monitor labs in am  Check mag      Thrombocytopenia  Chronic , likely due to cirrhosis  Will check b12 levels    Alcoholic cirrhosis  Patient says does not drink  Follow gi as scheduled outpatient  Plan for outpatient cscope outpatient scheduled in 09/19        VTE Risk Mitigation (From admission, onward)        Ordered     IP VTE LOW RISK PATIENT  Once      07/25/19 1447     Place sequential compression device  Until discontinued      07/25/19 1447         Critical care time spent on patient 35 minutes.    Kathy Hill MD  Department of Hospital Medicine   [facility]

## 2019-07-25 NOTE — ASSESSMENT & PLAN NOTE
Patient says does not drink  Follow gi as scheduled outpatient  Plan for outpatient cscope outpatient scheduled in 09/19

## 2019-07-25 NOTE — SUBJECTIVE & OBJECTIVE
Past Medical History:   Diagnosis Date    Alcoholic cirrhosis of liver     Arthritis     Hypertension     Pulmonary embolism        Past Surgical History:   Procedure Laterality Date    COLON SURGERY      HERNIA REPAIR         Review of patient's allergies indicates:   Allergen Reactions    Morphine Itching       No current facility-administered medications on file prior to encounter.      Current Outpatient Medications on File Prior to Encounter   Medication Sig    lisinopril 10 MG tablet Take 10 mg by mouth once daily.     Family History     None        Tobacco Use    Smoking status: Never Smoker   Substance and Sexual Activity    Alcohol use: Not Currently     Comment: freq    Drug use: Yes     Types: Marijuana    Sexual activity: Not on file     Comment: occ     Review of Systems   Constitutional: Positive for appetite change and unexpected weight change. Negative for chills and fever.   HENT: Negative for trouble swallowing.    Respiratory: Negative for cough and shortness of breath.    Cardiovascular: Negative for chest pain and leg swelling.   Gastrointestinal: Positive for abdominal pain, blood in stool, nausea and vomiting. Negative for diarrhea.   Genitourinary: Negative for dysuria and hematuria.   Musculoskeletal: Positive for arthralgias and gait problem.   Skin: Negative for rash.   Neurological: Negative for headaches.   Psychiatric/Behavioral: Negative for confusion. The patient is nervous/anxious.      Objective:     Vital Signs (Most Recent):  Temp: 98.7 °F (37.1 °C) (07/25/19 1345)  Pulse: 88 (07/25/19 1409)  Resp: 17 (07/25/19 1411)  BP: (!) 160/91 (07/25/19 1406)  SpO2: 98 % (07/25/19 1406) Vital Signs (24h Range):  Temp:  [98.5 °F (36.9 °C)-98.7 °F (37.1 °C)] 98.7 °F (37.1 °C)  Pulse:  [] 88  Resp:  [16-20] 17  SpO2:  [98 %-100 %] 98 %  BP: (160-179)/(80-95) 160/91     Weight: 81.6 kg (180 lb)  Body mass index is 24.41 kg/m².    Physical Exam   Constitutional: He is oriented  to person, place, and time. No distress.   anxious   HENT:   Head: Normocephalic and atraumatic.   Eyes: Pupils are equal, round, and reactive to light. EOM are normal.   Neck: Normal range of motion. Neck supple.   Cardiovascular: Regular rhythm.   tachycardic   Pulmonary/Chest: Effort normal and breath sounds normal. No respiratory distress.   Abdominal: Soft. Bowel sounds are normal. He exhibits no distension.   Mild tenderness in left upper quadrant and epigastric area.   Musculoskeletal: Normal range of motion. He exhibits no edema.   Mild hand tremors   Neurological: He is alert and oriented to person, place, and time. No cranial nerve deficit.   Skin: Skin is warm.   On left side of his anal opening, 3 small openings seen which patient says is chronic, no discharge seen currently,? Sinus    Psychiatric: He has a normal mood and affect.   Vitals reviewed.        CRANIAL NERVES     CN III, IV, VI   Pupils are equal, round, and reactive to light.  Extraocular motions are normal.        Significant Labs:   CBC:   Recent Labs   Lab 07/25/19  1053   WBC 7.86   HGB 10.4*   HCT 30.1*   PLT 77*     CMP:   Recent Labs   Lab 07/25/19  1053      K 2.9*   CL 96   CO2 28      BUN 7   CREATININE 1.1   CALCIUM 9.7   PROT 10.3*   ALBUMIN 3.7   BILITOT 7.7*   ALKPHOS 128   *   ALT 98*   ANIONGAP 14   EGFRNONAA >60     Coagulation:   Recent Labs   Lab 07/25/19  1053   INR 1.2       Significant Imaging:   CT Abdomen Pelvis With Contrast  Narrative: EXAMINATION:  CT ABDOMEN PELVIS WITH CONTRAST    CLINICAL HISTORY:  Abdominal distension;GI bleeding;    TECHNIQUE:  Low dose axial images, sagittal and coronal reformations were obtained from the lung bases to the pubic symphysis following the IV administration of 75 mL of Omnipaque 350 .  Oral contrast was not given.    COMPARISON:  None.    FINDINGS:  Images of the lower thorax are remarkable.    The liver is hypoattenuating, may reflect steatosis, correlation  with LFTs recommended.  The liver has a somewhat nodular contour, finding is nonspecific.  There is a vague focus of low attenuation adjacent to the gallbladder fossa, could reflect focus of fatty infiltration.  Additionally, ill-defined hypoattenuation is noted along the gallbladder fossa within the posterior aspect of the right hepatic lobe, same differential.  The spleen, pancreas, adrenal glands and gallbladder are grossly unremarkable.  There is no biliary dilation.  Several nonenlarged abdominal lymph nodes are noted.  The portal vein, splenic vein, SMV, celiac axis and SMA all are grossly patent.  Pancreatic duct is not dilated.  Surgical changes are noted of the right upper quadrant.    The kidneys enhance symmetrically without nephrolithiasis.  There is mild right hydronephrosis, no left hydronephrosis.  There is bilateral perinephric fat stranding, right greater than left.  The bilateral ureters are unremarkable without calculi seen.  The urinary bladder is unremarkable.  The prostate is prominent.    There are a few scattered colonic diverticula.  Surgical changes are noted of the right colon.  The appendix is not identified.  No pericecal inflammation.  The small bowel is grossly unremarkable.  No focal organized pelvic fluid collection.  There may be subtle fat stranding adjacent to the mid to distal descending colon in the region of several diverticula versus nonspecific mesenteric edema.  There is atherosclerotic calcification of the aorta and its branches.    Degenerative changes are noted of the spine.  No significant inguinal lymphadenopathy.  There is irregularity, sclerosis, and partial destruction involving the left femoral head, correlation with any history of previous injury versus osteonecrosis/infection recommended.  There is a fat containing umbilical hernia.  Anterior abdominal wall varices noted as well as prominent anterior mesenteric vessels.  Impression: 1. Mild right hydronephrosis  noting right perinephric fat stranding greater than left.  Finding is nonspecific however sequela of recently passed calculus or infection are considerations and correlation with urinalysis is recommended.  2. Findings suggesting hepatic steatosis noting nodular contour to the liver, correlation with any history of cirrhosis recommended.  Prominent venous varices within the anterior abdomen anterior abdominal wall may reflect sequela of portal hypertension.  3. Vague foci of low attenuation about the gallbladder fossa could reflect focal fatty infiltration although nonspecific and suboptimally evaluated.  Nonemergent, outpatient MRI could be performed for further evaluation as warranted to confirm fat deposition.  4. Slight indistinctness about a few diverticula involving the mid to distal descending colon.  This is likely on the basis of scattered strand-like fluid in the region as a generalized process rather than diverticulitis although correlation with any focal tenderness in the region is recommended as early changes of diverticulitis are a consideration.  5. Surgical changes of the colon.  6. Several additional findings above.    Electronically signed by: Brady Hope MD  Date:    07/25/2019  Time:    12:15

## 2019-07-25 NOTE — ED NOTES
Pt reporting PMH of upper GI bleed. Reporting LUQ and LLQ abd pain x yesterday, reporting one episode of coffee ground emesis x yesterday and one this AM. Denies SOB, CP, blood in stool. + reports of fatigue. Pt AAOx4 and appropriate at this time. Respirations even and unlabored. No acute distress noted.

## 2019-07-25 NOTE — CONSULTS
[facility]  Gastroenterology  Consult Note    Patient Name: Irvin Diaz  MRN: [id number]  Admission Date: 7/25/2019  Hospital Length of Stay: 0 days  Code Status: Full Code   Attending Provider: Consulting Provider: Brian Trivedi MD  Primary Care Physician: Primary Doctor No  Principal Problem:Upper GI bleed    Inpatient consult to Gastroenterology  Consult performed by: Brian Trivedi MD  Consult ordered by: Kathy Hill MD  Reason for consult: hematemesis        Subjective:     HPI: This gent presented to the ED with a history of hematemesis last night. He was reclined and felt nauseated suddenly and it was severe resulting in vomiting of coffee ground like material. No fresh blood clots and this was one episode. He was weak and as such came to the ED where he was noted to have stable vital signs. He has cirrhosis related to alcohol use in the past. He has had coffee ground emesis in the past and in 9/2018 had varices banded. He generally tolerates his diet well and has regular bowel habits devoid of passing bloody stool or melena. He has used NSAID's in the past that caused GI symptoms and as such he does not use them especially in the context of prior UGI bleeding. He had  A CT done in the ED and it was reviewed. He has changes of portal hypertension and cirrhosis with fatty liver and he has had a partial colon resection for neoplasia in 2007. He does not smoke or drink alcohol or use illegal drugs. No family history of colon cancer or chronic inheritable liver disease. His H/H shows a modest anemia and he is currently very stable. Long discussion with patient and his wife and discussed with staff. He has hada pulmonary embolus in the relatively recent past and he was on anti-coagulants then but this was stopped months ago.    Past Medical History:   Diagnosis Date    Alcoholic cirrhosis of liver     Arthritis     Hypertension     Pulmonary embolism        Past Surgical  History:   Procedure Laterality Date    COLON SURGERY      HERNIA REPAIR         Review of patient's allergies indicates:   Allergen Reactions    Morphine Itching     Family History     None        Tobacco Use    Smoking status: Never Smoker   Substance and Sexual Activity    Alcohol use: Not Currently     Comment: freq    Drug use: Yes     Types: Marijuana    Sexual activity: Not on file     Comment: occ     Review of Systems   Constitutional: Positive for activity change, appetite change and fatigue. Negative for fever and unexpected weight change.   HENT: Negative for congestion, dental problem, facial swelling, nosebleeds and trouble swallowing.    Eyes: Negative for pain, discharge and itching.   Respiratory: Negative for apnea, cough, choking, chest tightness, shortness of breath, wheezing and stridor.    Cardiovascular: Negative for chest pain, palpitations and leg swelling.   Gastrointestinal: Positive for nausea and vomiting. Negative for abdominal distention, abdominal pain, anal bleeding, blood in stool, constipation, diarrhea and rectal pain.   Endocrine: Negative for cold intolerance, heat intolerance, polydipsia, polyphagia and polyuria.   Genitourinary: Negative for difficulty urinating and flank pain.   Musculoskeletal: Positive for arthralgias and back pain. Negative for gait problem, joint swelling, myalgias, neck pain and neck stiffness.   Skin: Negative for color change, pallor, rash and wound.   Allergic/Immunologic: Negative for environmental allergies, food allergies and immunocompromised state.   Neurological: Positive for tremors and weakness. Negative for dizziness, seizures, facial asymmetry and headaches.   Hematological: Negative for adenopathy. Does not bruise/bleed easily.   Psychiatric/Behavioral: Negative for agitation, behavioral problems, confusion and decreased concentration.     Objective:     Vital Signs (Most Recent):  Temp: 98.7 °F (37.1 °C) (07/25/19 1345)  Pulse: 88  (07/25/19 1409)  Resp: 17 (07/25/19 1411)  BP: (!) 160/91 (07/25/19 1406)  SpO2: 98 % (07/25/19 1406) Vital Signs (24h Range):  Temp:  [98.5 °F (36.9 °C)-98.7 °F (37.1 °C)] 98.7 °F (37.1 °C)  Pulse:  [] 88  Resp:  [16-20] 17  SpO2:  [98 %-100 %] 98 %  BP: (160-179)/(80-95) 160/91     Weight: 79 kg (174 lb 2.6 oz) (07/25/19 1540)  Body mass index is 23.62 kg/m².    No intake or output data in the 24 hours ending 07/25/19 1555    Lines/Drains/Airways     Peripheral Intravenous Line                 Peripheral IV - Single Lumen 07/25/19 1040 20 G Left Antecubital less than 1 day         Peripheral IV - Single Lumen 07/25/19 1108 20 G Right Forearm less than 1 day                Physical Exam   Constitutional: He is oriented to person, place, and time. He appears well-developed and well-nourished. No distress.   HENT:   Head: Normocephalic and atraumatic.   Eyes: Pupils are equal, round, and reactive to light. EOM are normal. No scleral icterus.   Neck: Normal range of motion. Neck supple. No JVD present. No tracheal deviation present. No thyromegaly present.   Cardiovascular: Normal rate, regular rhythm and normal heart sounds. Exam reveals no gallop and no friction rub.   No murmur heard.  Pulmonary/Chest: Effort normal and breath sounds normal. No stridor. No respiratory distress. He has no wheezes. He has no rales. He exhibits no tenderness.   Abdominal: Soft. Bowel sounds are normal. He exhibits no distension and no mass. There is no tenderness. There is no rebound and no guarding. No hernia.   Musculoskeletal: Normal range of motion. He exhibits no edema or deformity.   Neurological: He is alert and oriented to person, place, and time. No cranial nerve deficit.   Skin: Skin is warm and dry. He is not diaphoretic.   Psychiatric: He has a normal mood and affect. His behavior is normal. Judgment and thought content normal.       Significant Labs:  Modest anemia, thrombocytopenia, low albumin, normal  bilirubin    Significant Imaging:  CT scan reviewed    Assessment/Plan: This gent has advanced liver disease/cirrhosis that has been established in the past. He has had esophageal varices banded in 9/2018 and fared quite well until this experience. He is hemodynamically stable ansd has not bled since his admission. No signs of visceral ischemia or encephalopathy and he is on Octreotide as well as pantoprazole. EGD was advised and explained in great detail. The procedure comes with enhanced risk due to advanced liver disease and thrombocytopenia.      Active Diagnoses:    Diagnosis Date Noted POA    PRINCIPAL PROBLEM:  Upper GI bleed [K92.2] 07/25/2019 Yes    Benign essential HTN [I10] 07/25/2019 Yes    Hydronephrosis of right kidney [N13.30] 07/25/2019 Yes    Elevated LFTs [R94.5] 07/25/2019 Yes    AVN (avascular necrosis of bone) [M87.00] 05/11/2019 Yes    Thrombocytopenia [D69.6] 05/11/2019 Yes    Alcoholic cirrhosis [K70.30] 05/11/2019 Yes    Anemia [D64.9] 05/11/2019 Yes    Hypokalemia [E87.6] 05/11/2019 Yes      Problems Resolved During this Admission:           Thank you for your consult.     Brian Trivedi MD  Gastroenterology  [facility]

## 2019-07-25 NOTE — PLAN OF CARE
Problem: Adult Inpatient Plan of Care  Goal: Plan of Care Review  Pt arrived from ED on room air with potassium infusing. Pt tachycardic on arrival, pt voiced anxiety about admission to ICU. MD ordered PRN xanax. Pt with unrepaired fracture to left hip, reports transient need for cane depending on pain. Pt reports chronic pain to left hip. Pt able to ambulate and void per toilet through instructed to call for assistance with mobility. Pt reports new onset blood in stool. No hematemesis since arrival to ICU, PRN order for nausea added to MAR. pts hypertension is stable. Pt continues in the 90s and 100s HR. Pt on room air with octreotide, pantoprazole and NS infusing at this time. Pt stable for handoff.

## 2019-07-25 NOTE — HPI
43 year old male with past medical history of alcoholic cirrhosis, htn, thrombocytopenia, esophageal varices with last banding in 10/18 per patient, pulmonary embolism in October 2018 for which he was on anticoagulation but was taken off within 3 months due to the risk of bleeding with esophageal viruses,   came in due to 2 episodes of coffee ground emesis.  He had 1 episode of hematemesis last night and another 1 this morning.  He denied seeing any bright red blood.  He has not had a bowel movement since yesterday and denies melena in the stools.  The patient has not had any further episodes since he has been in the hospital.  He denies drinking alcohol since last year and has not been on NSAIDs since 1 of his GI bleed was attributed to NSAID use.  He also complains of decreased appetite for the last few months as well as weight loss of 20 lb in the last 2-3  Months.  He complains of some left upper abdominal discomfort as well as epigastric discomfort, no back pain, no dysuria or urinary difficulties.  His H&H was stable on admit.  He was started on PPI and octreotide drip and GI was consulted.

## 2019-07-25 NOTE — ED NOTES
VINCENT at BS for rectal examination. RN at BS for assistance, witness. + FOBT confirmed by VINCENT. Pt tolerated well.

## 2019-07-25 NOTE — ASSESSMENT & PLAN NOTE
bp on higher side  Will continue lisinopril and monitor vitals  Coreg listed on his home med lsit but patient is not taking it

## 2019-07-25 NOTE — ED PROVIDER NOTES
Encounter Date: 7/25/2019       History     Chief Complaint   Patient presents with    Hematemesis     Pt reports ground emesis X 1 last night.Report RUQ abdominal pain & nausea. Reports past ETOH related GI bleed. Last ETOH use October.      Patient is a 43 year old male with alcholic cirrhosis, hypertension and bilateral, unprovoked PE (August 2018), Anticoag x 3 months, history of upper GI bleed (presents secondary to NSAID or alcohol use)  who presents to the emergency department with hematemesis.  Patient reports 1 episode of coffee-ground emesis last night.  He reports he has been spitting up since then.  He reports another episodes of hematemesis this morning.  He reports mild, left upper quadrant pain. He reports generalized fatigue for the past 2 days.  He denies chest pain, shortness of breath, or syncope.  He denies dark stools but states he has not had a bowel movement today.  He also reports unintentional, 20 lb weight loss over the past few months.  He reports decreased appetite.  He is currently being worked up for these symptoms by GI at [facility].  He denies fever.  He denies recent alcohol (last drink October 2018) or NSAID use.        Review of patient's allergies indicates:   Allergen Reactions    Morphine Itching     Past Medical History:   Diagnosis Date    Alcoholic cirrhosis of liver     Arthritis     Hypertension      Past Surgical History:   Procedure Laterality Date    COLON SURGERY      HERNIA REPAIR       No family history on file.  Social History     Tobacco Use    Smoking status: Never Smoker   Substance Use Topics    Alcohol use: Not Currently     Comment: freq    Drug use: Yes     Types: Marijuana     Review of Systems   Constitutional: Positive for appetite change, fatigue and unexpected weight change. Negative for chills and fever.   Eyes: Negative for pain and visual disturbance.   Respiratory: Negative for shortness of breath.    Cardiovascular: Negative for chest pain.    Gastrointestinal: Positive for abdominal distention and vomiting. Negative for abdominal pain, blood in stool, diarrhea and nausea.   Genitourinary: Negative for difficulty urinating and dysuria.   Musculoskeletal: Negative for arthralgias.   Skin: Negative for rash.   Neurological: Negative for headaches.       Physical Exam     Initial Vitals [07/25/19 1010]   BP Pulse Resp Temp SpO2   (!) 179/80 108 18 98.5 °F (36.9 °C) 99 %      MAP       --         Physical Exam    Vitals reviewed.  Constitutional: He is cooperative. No distress.   HENT:   Head: Normocephalic and atraumatic.   Mouth/Throat: Oropharynx is clear and moist.   Eyes: EOM are normal. Pupils are equal, round, and reactive to light. Scleral icterus is present.   Neck: Normal range of motion. Neck supple.   Cardiovascular: Regular rhythm and intact distal pulses. Tachycardia present.    Pulmonary/Chest: Breath sounds normal. He has no wheezes. He has no rhonchi. He has no rales.   Abdominal: Soft. Bowel sounds are normal. He exhibits distension. He exhibits no mass. There is tenderness (Mild left upper quadrant).   Genitourinary: Rectal exam shows guaiac positive stool (positive ). Rectal exam shows no mass. Guaiac positive stool (positive ). : Acceptable.  Musculoskeletal: Normal range of motion. He exhibits no edema.   Neurological: He is alert and oriented to person, place, and time. GCS eye subscore is 4. GCS verbal subscore is 5. GCS motor subscore is 6.   Skin: Skin is warm and dry. No rash noted.   Psychiatric: He has a normal mood and affect. His behavior is normal.         ED Course   Procedures  Labs Reviewed   CBC W/ AUTO DIFFERENTIAL - Abnormal; Notable for the following components:       Result Value    RBC 2.87 (*)     Hemoglobin 10.4 (*)     Hematocrit 30.1 (*)     Mean Corpuscular Volume 105 (*)     Mean Corpuscular Hemoglobin 36.2 (*)     RDW 16.2 (*)     Platelets 77 (*)     All other components within normal limits    COMPREHENSIVE METABOLIC PANEL - Abnormal; Notable for the following components:    Potassium 2.9 (*)     Total Protein 10.3 (*)     Total Bilirubin 7.7 (*)      (*)     ALT 98 (*)     All other components within normal limits   LIPASE - Abnormal; Notable for the following components:    Lipase 124 (*)     All other components within normal limits   PROTIME-INR - Abnormal; Notable for the following components:    Prothrombin Time 13.4 (*)     All other components within normal limits   URINALYSIS, REFLEX TO URINE CULTURE - Abnormal; Notable for the following components:    Specific Gravity, UA <=1.005 (*)     All other components within normal limits    Narrative:     Preferred Collection Type->Urine, Clean Catch   TYPE & SCREEN          Imaging Results          CT Abdomen Pelvis With Contrast (Final result)  Result time 07/25/19 12:15:22    Final result by Brady Hope MD (07/25/19 12:15:22)                 Impression:      1. Mild right hydronephrosis noting right perinephric fat stranding greater than left.  Finding is nonspecific however sequela of recently passed calculus or infection are considerations and correlation with urinalysis is recommended.  2. Findings suggesting hepatic steatosis noting nodular contour to the liver, correlation with any history of cirrhosis recommended.  Prominent venous varices within the anterior abdomen anterior abdominal wall may reflect sequela of portal hypertension.  3. Vague foci of low attenuation about the gallbladder fossa could reflect focal fatty infiltration although nonspecific and suboptimally evaluated.  Nonemergent, outpatient MRI could be performed for further evaluation as warranted to confirm fat deposition.  4. Slight indistinctness about a few diverticula involving the mid to distal descending colon.  This is likely on the basis of scattered strand-like fluid in the region as a generalized process rather than diverticulitis although correlation  with any focal tenderness in the region is recommended as early changes of diverticulitis are a consideration.  5. Surgical changes of the colon.  6. Several additional findings above.      Electronically signed by: Brady Hope MD  Date:    07/25/2019  Time:    12:15             Narrative:    EXAMINATION:  CT ABDOMEN PELVIS WITH CONTRAST    CLINICAL HISTORY:  Abdominal distension;GI bleeding;    TECHNIQUE:  Low dose axial images, sagittal and coronal reformations were obtained from the lung bases to the pubic symphysis following the IV administration of 75 mL of Omnipaque 350 .  Oral contrast was not given.    COMPARISON:  None.    FINDINGS:  Images of the lower thorax are remarkable.    The liver is hypoattenuating, may reflect steatosis, correlation with LFTs recommended.  The liver has a somewhat nodular contour, finding is nonspecific.  There is a vague focus of low attenuation adjacent to the gallbladder fossa, could reflect focus of fatty infiltration.  Additionally, ill-defined hypoattenuation is noted along the gallbladder fossa within the posterior aspect of the right hepatic lobe, same differential.  The spleen, pancreas, adrenal glands and gallbladder are grossly unremarkable.  There is no biliary dilation.  Several nonenlarged abdominal lymph nodes are noted.  The portal vein, splenic vein, SMV, celiac axis and SMA all are grossly patent.  Pancreatic duct is not dilated.  Surgical changes are noted of the right upper quadrant.    The kidneys enhance symmetrically without nephrolithiasis.  There is mild right hydronephrosis, no left hydronephrosis.  There is bilateral perinephric fat stranding, right greater than left.  The bilateral ureters are unremarkable without calculi seen.  The urinary bladder is unremarkable.  The prostate is prominent.    There are a few scattered colonic diverticula.  Surgical changes are noted of the right colon.  The appendix is not identified.  No pericecal inflammation.   The small bowel is grossly unremarkable.  No focal organized pelvic fluid collection.  There may be subtle fat stranding adjacent to the mid to distal descending colon in the region of several diverticula versus nonspecific mesenteric edema.  There is atherosclerotic calcification of the aorta and its branches.    Degenerative changes are noted of the spine.  No significant inguinal lymphadenopathy.  There is irregularity, sclerosis, and partial destruction involving the left femoral head, correlation with any history of previous injury versus osteonecrosis/infection recommended.  There is a fat containing umbilical hernia.  Anterior abdominal wall varices noted as well as prominent anterior mesenteric vessels.                                 Medical Decision Making:   History:   Old Records Summarized: records from another hospital.       <> Summary of Records: EGD 05/10/2019  - Grade 1 Varices Lower 1/3 Esophagus  - Portal Hypertensive gastropathy  - Normal examined duodenum   - No specimens collected  GI history notable for colonic resection of benign unknown lesion in 2008 in [place] and diverticulosis on colonoscopy in 2017  Patient has scheduled CT triple phase liver protocol and colonoscopy next month.  Initial Assessment:   Urgent evaluation of a 43 y.o. male presenting to the emergency department complaining of coffee-ground emesis since yesterday and generalized fatigue. Patient is afebrile, nontoxic appearing and hemodynamically stable. He is mildly tachycardic.  Physical exam outlined above, will workup with GI bleed.  Two large-bore IVs will be established, patient will receive Protonix drip and IV fluids.  Will obtain CT abdomen pelvis.  Patient currently not actively having emesis here in the emergency department.  ED Management:  CBC reveals stable anemia with H&H of 10.4 and 30.1  Chemistry reveals hypokalemia, potassium is 2.9.  LFTs are elevated.  AST is 274 and ALT is 98.  Lipase is elevated at  124.  CT abdomen pelvis reveals right-sided hydronephrosis.  Urinalysis is normal..  There are chronic changes seen, including hepatic steatosis, coronary patient's liver cirrhosis.  There is prominent venous varices, reflecting patient's known portal hypertension.  There are vague low attenuation foci of the gallbladder.  Diverticula noted without diverticulitis.  Patient will be admitted for further observation.   Patient was given IV fluids, IV Protonix drip and IV potassium here in the emergency department.  Case discussed with Dr. Arriaga and  Yuliana Perez.   Will be admitted to inpatient and ICU unit this patient will be on to drips and given high risk of bleeding with varices.    Other:   I have discussed this case with another health care provider.                      Clinical Impression:     1. Upper GI bleed    2. Hypokalemia    3. Portal hypertension    4. Bleeding esophageal varices, unspecified esophageal varices type    5. Alcoholic cirrhosis of liver without ascites                               Rakesh Alexandre PA-C  07/25/19 0779

## 2019-07-25 NOTE — PLAN OF CARE
Problem: Adult Inpatient Plan of Care  Goal: Optimal Comfort and Wellbeing  Pt reports anxiety about needing to be in ICU and in the hospital. Pt has PRN medication for anxiety.

## 2019-07-25 NOTE — ASSESSMENT & PLAN NOTE
Has chronic anemia which seems stable since last admit  Will monitor h/h and transfuse as needed

## 2019-07-25 NOTE — ASSESSMENT & PLAN NOTE
Could be variceal bleed with h/o cirrhosis and varices  Last egd in 05/19 which showed stable varices, last banding around 10/18  Started on ppi and octreotide drip  Clear liquid diet  Gi consulted , likely plan for egd in am  Monitor h/h  Likely need b blocker outpatient for px for variceal bleeding

## 2019-07-25 NOTE — ASSESSMENT & PLAN NOTE
Mild right hydronephrosis on ct scan with perinephric stranding which could be non specific finding  Patient denies flank pain or dysuria or difficulty urinating  ua was negative  Creatinine normal  Will monitor urine output  Check post void residual scan

## 2019-07-26 ENCOUNTER — ANESTHESIA (OUTPATIENT)
Dept: ENDOSCOPY | Facility: OTHER | Age: 44
DRG: 378 | End: 2019-07-26
Payer: MEDICAID

## 2019-07-26 ENCOUNTER — ANESTHESIA EVENT (OUTPATIENT)
Dept: ENDOSCOPY | Facility: OTHER | Age: 44
DRG: 378 | End: 2019-07-26
Payer: MEDICAID

## 2019-07-26 PROBLEM — D62 ACUTE BLOOD LOSS ANEMIA: Status: ACTIVE | Noted: 2019-05-11

## 2019-07-26 PROBLEM — E83.42 HYPOMAGNESEMIA: Status: ACTIVE | Noted: 2019-07-26

## 2019-07-26 LAB
ALBUMIN SERPL BCP-MCNC: 3 G/DL (ref 3.5–5.2)
ALP SERPL-CCNC: 98 U/L (ref 55–135)
ALT SERPL W/O P-5'-P-CCNC: 70 U/L (ref 10–44)
ANION GAP SERPL CALC-SCNC: 9 MMOL/L (ref 8–16)
AST SERPL-CCNC: 174 U/L (ref 10–40)
BASOPHILS # BLD AUTO: 0.02 K/UL (ref 0–0.2)
BASOPHILS NFR BLD: 0.3 % (ref 0–1.9)
BILIRUB DIRECT SERPL-MCNC: 3.5 MG/DL (ref 0.1–0.3)
BILIRUB SERPL-MCNC: 7.1 MG/DL (ref 0.1–1)
BUN SERPL-MCNC: 8 MG/DL (ref 6–20)
CALCIUM SERPL-MCNC: 9.1 MG/DL (ref 8.7–10.5)
CHLORIDE SERPL-SCNC: 102 MMOL/L (ref 95–110)
CO2 SERPL-SCNC: 26 MMOL/L (ref 23–29)
CREAT SERPL-MCNC: 1.2 MG/DL (ref 0.5–1.4)
DIFFERENTIAL METHOD: ABNORMAL
EOSINOPHIL # BLD AUTO: 0 K/UL (ref 0–0.5)
EOSINOPHIL NFR BLD: 0.6 % (ref 0–8)
ERYTHROCYTE [DISTWIDTH] IN BLOOD BY AUTOMATED COUNT: 15.9 % (ref 11.5–14.5)
EST. GFR  (AFRICAN AMERICAN): >60 ML/MIN/1.73 M^2
EST. GFR  (NON AFRICAN AMERICAN): >60 ML/MIN/1.73 M^2
FOLATE SERPL-MCNC: 4.5 NG/ML (ref 4–24)
GLUCOSE SERPL-MCNC: 114 MG/DL (ref 70–110)
HCT VFR BLD AUTO: 27 % (ref 40–54)
HGB BLD-MCNC: 8.9 G/DL (ref 14–18)
IMM GRANULOCYTES # BLD AUTO: 0.01 K/UL (ref 0–0.04)
IMM GRANULOCYTES NFR BLD AUTO: 0.1 % (ref 0–0.5)
LYMPHOCYTES # BLD AUTO: 1.6 K/UL (ref 1–4.8)
LYMPHOCYTES NFR BLD: 24 % (ref 18–48)
MAGNESIUM SERPL-MCNC: 1.4 MG/DL (ref 1.6–2.6)
MCH RBC QN AUTO: 35.3 PG (ref 27–31)
MCHC RBC AUTO-ENTMCNC: 33 G/DL (ref 32–36)
MCV RBC AUTO: 107 FL (ref 82–98)
MONOCYTES # BLD AUTO: 0.7 K/UL (ref 0.3–1)
MONOCYTES NFR BLD: 9.6 % (ref 4–15)
NEUTROPHILS # BLD AUTO: 4.4 K/UL (ref 1.8–7.7)
NEUTROPHILS NFR BLD: 65.4 % (ref 38–73)
NRBC BLD-RTO: 0 /100 WBC
PHOSPHATE SERPL-MCNC: 3.1 MG/DL (ref 2.7–4.5)
PLATELET # BLD AUTO: 68 K/UL (ref 150–350)
PMV BLD AUTO: 12.2 FL (ref 9.2–12.9)
POCT GLUCOSE: 137 MG/DL (ref 70–110)
POTASSIUM SERPL-SCNC: 3.7 MMOL/L (ref 3.5–5.1)
PROT SERPL-MCNC: 8.4 G/DL (ref 6–8.4)
RBC # BLD AUTO: 2.52 M/UL (ref 4.6–6.2)
SODIUM SERPL-SCNC: 137 MMOL/L (ref 136–145)
VIT B12 SERPL-MCNC: 1125 PG/ML (ref 210–950)
WBC # BLD AUTO: 6.79 K/UL (ref 3.9–12.7)

## 2019-07-26 PROCEDURE — 82607 VITAMIN B-12: CPT

## 2019-07-26 PROCEDURE — 83735 ASSAY OF MAGNESIUM: CPT

## 2019-07-26 PROCEDURE — 99233 PR SUBSEQUENT HOSPITAL CARE,LEVL III: ICD-10-PCS | Mod: ,,, | Performed by: INTERNAL MEDICINE

## 2019-07-26 PROCEDURE — 25000003 PHARM REV CODE 250: Performed by: INTERNAL MEDICINE

## 2019-07-26 PROCEDURE — 63600175 PHARM REV CODE 636 W HCPCS: Performed by: NURSE ANESTHETIST, CERTIFIED REGISTERED

## 2019-07-26 PROCEDURE — 11000001 HC ACUTE MED/SURG PRIVATE ROOM

## 2019-07-26 PROCEDURE — 36415 COLL VENOUS BLD VENIPUNCTURE: CPT

## 2019-07-26 PROCEDURE — 43235 EGD DIAGNOSTIC BRUSH WASH: CPT | Performed by: INTERNAL MEDICINE

## 2019-07-26 PROCEDURE — 85025 COMPLETE CBC W/AUTO DIFF WBC: CPT

## 2019-07-26 PROCEDURE — 37000009 HC ANESTHESIA EA ADD 15 MINS: Performed by: INTERNAL MEDICINE

## 2019-07-26 PROCEDURE — 63600175 PHARM REV CODE 636 W HCPCS: Performed by: INTERNAL MEDICINE

## 2019-07-26 PROCEDURE — 99233 SBSQ HOSP IP/OBS HIGH 50: CPT | Mod: ,,, | Performed by: INTERNAL MEDICINE

## 2019-07-26 PROCEDURE — 84100 ASSAY OF PHOSPHORUS: CPT

## 2019-07-26 PROCEDURE — 94761 N-INVAS EAR/PLS OXIMETRY MLT: CPT

## 2019-07-26 PROCEDURE — 82746 ASSAY OF FOLIC ACID SERUM: CPT

## 2019-07-26 PROCEDURE — C9113 INJ PANTOPRAZOLE SODIUM, VIA: HCPCS | Performed by: INTERNAL MEDICINE

## 2019-07-26 PROCEDURE — 82248 BILIRUBIN DIRECT: CPT

## 2019-07-26 PROCEDURE — 80053 COMPREHEN METABOLIC PANEL: CPT

## 2019-07-26 PROCEDURE — 37000008 HC ANESTHESIA 1ST 15 MINUTES: Performed by: INTERNAL MEDICINE

## 2019-07-26 RX ORDER — LANOLIN ALCOHOL/MO/W.PET/CERES
400 CREAM (GRAM) TOPICAL 2 TIMES DAILY
Status: DISCONTINUED | OUTPATIENT
Start: 2019-07-26 | End: 2019-07-27 | Stop reason: HOSPADM

## 2019-07-26 RX ORDER — SODIUM CHLORIDE 0.9 % (FLUSH) 0.9 %
3 SYRINGE (ML) INJECTION
Status: DISCONTINUED | OUTPATIENT
Start: 2019-07-26 | End: 2019-07-27 | Stop reason: HOSPADM

## 2019-07-26 RX ORDER — HYDROMORPHONE HYDROCHLORIDE 2 MG/ML
0.4 INJECTION, SOLUTION INTRAMUSCULAR; INTRAVENOUS; SUBCUTANEOUS EVERY 5 MIN PRN
Status: DISCONTINUED | OUTPATIENT
Start: 2019-07-26 | End: 2019-07-27 | Stop reason: HOSPADM

## 2019-07-26 RX ORDER — MEPERIDINE HYDROCHLORIDE 25 MG/ML
12.5 INJECTION INTRAMUSCULAR; INTRAVENOUS; SUBCUTANEOUS ONCE AS NEEDED
Status: ACTIVE | OUTPATIENT
Start: 2019-07-26 | End: 2019-07-27

## 2019-07-26 RX ORDER — PROPOFOL 10 MG/ML
VIAL (ML) INTRAVENOUS
Status: DISCONTINUED | OUTPATIENT
Start: 2019-07-26 | End: 2019-07-26

## 2019-07-26 RX ORDER — PROPRANOLOL HYDROCHLORIDE 10 MG/1
10 TABLET ORAL 3 TIMES DAILY
Status: DISCONTINUED | OUTPATIENT
Start: 2019-07-26 | End: 2019-07-27 | Stop reason: HOSPADM

## 2019-07-26 RX ORDER — ONDANSETRON 2 MG/ML
4 INJECTION INTRAMUSCULAR; INTRAVENOUS DAILY PRN
Status: DISCONTINUED | OUTPATIENT
Start: 2019-07-26 | End: 2019-07-27 | Stop reason: HOSPADM

## 2019-07-26 RX ORDER — PANTOPRAZOLE SODIUM 40 MG/10ML
40 INJECTION, POWDER, LYOPHILIZED, FOR SOLUTION INTRAVENOUS 2 TIMES DAILY
Status: DISCONTINUED | OUTPATIENT
Start: 2019-07-26 | End: 2019-07-27 | Stop reason: HOSPADM

## 2019-07-26 RX ORDER — OXYCODONE HYDROCHLORIDE 5 MG/1
5 TABLET ORAL
Status: DISCONTINUED | OUTPATIENT
Start: 2019-07-26 | End: 2019-07-27 | Stop reason: HOSPADM

## 2019-07-26 RX ADMIN — DEXTROSE 8 MG/HR: 50 INJECTION, SOLUTION INTRAVENOUS at 02:07

## 2019-07-26 RX ADMIN — PROPRANOLOL HYDROCHLORIDE 10 MG: 10 TABLET ORAL at 09:07

## 2019-07-26 RX ADMIN — ACETAMINOPHEN 650 MG: 325 TABLET ORAL at 09:07

## 2019-07-26 RX ADMIN — OCTREOTIDE ACETATE 50 MCG/HR: 500 INJECTION, SOLUTION INTRAVENOUS; SUBCUTANEOUS at 10:07

## 2019-07-26 RX ADMIN — PROPRANOLOL HYDROCHLORIDE 10 MG: 10 TABLET ORAL at 04:07

## 2019-07-26 RX ADMIN — DEXTROSE 8 MG/HR: 50 INJECTION, SOLUTION INTRAVENOUS at 06:07

## 2019-07-26 RX ADMIN — PANTOPRAZOLE SODIUM 40 MG: 40 INJECTION, POWDER, FOR SOLUTION INTRAVENOUS at 09:07

## 2019-07-26 RX ADMIN — OCTREOTIDE ACETATE 50 MCG/HR: 500 INJECTION, SOLUTION INTRAVENOUS; SUBCUTANEOUS at 12:07

## 2019-07-26 RX ADMIN — LISINOPRIL 10 MG: 10 TABLET ORAL at 08:07

## 2019-07-26 RX ADMIN — PROPOFOL 60 MG: 10 INJECTION, EMULSION INTRAVENOUS at 07:07

## 2019-07-26 RX ADMIN — OCTREOTIDE ACETATE 50 MCG/HR: 500 INJECTION, SOLUTION INTRAVENOUS; SUBCUTANEOUS at 05:07

## 2019-07-26 RX ADMIN — SODIUM CHLORIDE: 0.9 INJECTION, SOLUTION INTRAVENOUS at 06:07

## 2019-07-26 RX ADMIN — Medication 400 MG: at 09:07

## 2019-07-26 RX ADMIN — ALPRAZOLAM 0.25 MG: 0.25 TABLET ORAL at 10:07

## 2019-07-26 NOTE — ASSESSMENT & PLAN NOTE
Likely due to cirrhosis  Hep panel negative in 05/19  Monitor check direct bilirubin with elevated total bilirubin

## 2019-07-26 NOTE — ANESTHESIA PREPROCEDURE EVALUATION
07/26/2019  Irvin Diaz is a 43 y.o., male.    Anesthesia Evaluation    I have reviewed the Patient Summary Reports.    I have reviewed the Nursing Notes.   I have reviewed the Medications.     Review of Systems  Anesthesia Hx:  No problems with previous Anesthesia  Denies Family Hx of Anesthesia complications.   Denies Personal Hx of Anesthesia complications.   Social:  Alcohol Use    Hematology/Oncology:     Oncology Normal    -- Anemia:  -- Thrombocytopenia:    EENT/Dental:EENT/Dental Normal   Cardiovascular:   Exercise tolerance: good Hypertension    Pulmonary:  Pulmonary Normal History of PE   Renal/:  Renal/ Normal     Hepatic/GI:   Liver Disease, esoph varices Liver Disease, Alcoholic Liver Disease    Musculoskeletal:  Musculoskeletal Normal    Neurological:  Neurology Normal    Endocrine:  Endocrine Normal    Dermatological:  Skin Normal    Psych:  Psychiatric Normal           Physical Exam  General:  Well nourished    Airway/Jaw/Neck:  Airway Findings: Mouth Opening: Normal Tongue: Normal  General Airway Assessment: Adult  Mallampati: I  TM Distance: Normal, at least 6 cm  Jaw/Neck Findings:  Neck ROM: Normal ROM      Dental:  Dental Findings: In tact             Anesthesia Plan  Type of Anesthesia, risks & benefits discussed:  Anesthesia Type:  general  Patient's Preference:   Intra-op Monitoring Plan:   Intra-op Monitoring Plan Comments:   Post Op Pain Control Plan:   Post Op Pain Control Plan Comments:   Induction:   IV  Beta Blocker:         Informed Consent: Patient understands risks and agrees with Anesthesia plan.  Questions answered. Anesthesia consent signed with patient.  ASA Score: 3     Day of Surgery Review of History & Physical:    H&P update referred to the surgeon.         Ready For Surgery From Anesthesia Perspective.

## 2019-07-26 NOTE — ASSESSMENT & PLAN NOTE
h/o cirrhosis and varices  Last egd in 05/19 which showed stable varices, last banding around 10/18  egd showed grade 2 varices, portal htn related gastritis  and duodenitis  Started on ppi and octreotide drips, change ppi to 40 mg iv bid, continue octreotide drip  Gi recs appreciated  Monitor h/h  Added propanolol  Advance to low sodium diet  tx to floor

## 2019-07-26 NOTE — PROGRESS NOTES
Stated that he would like something to help with sleep. EICU notified. Also discussed c MAYE the frequency of lab draws after the bloody BM that was flushed on day shift.

## 2019-07-26 NOTE — [employer] PATIENT INSTRUCTIONS
Discharge Summary/Instructions after an Endoscopic Procedure  Patient Name: Irvin Macias  Patient MRN: [id number]  Patient YOB: 1975  Friday, July 26, 2019  Brian Trivedi MD  RESTRICTIONS:  During your procedure today, you received medications for sedation.  These   medications may affect your judgment, balance and coordination.  Therefore,   for 24 hours, you have the following restrictions:   - DO NOT drive a car, operate machinery, make legal/financial decisions,   sign important papers or drink alcohol.    ACTIVITY:  Today: no heavy lifting, straining or running due to procedural   sedation/anesthesia.  The following day: return to full activity including work.  DIET:  Eat and drink normally unless instructed otherwise.     TREATMENT FOR COMMON SIDE EFFECTS:  - Mild abdominal pain, nausea, belching, bloating or excessive gas:  rest,   eat lightly and use a heating pad.  - Sore Throat: treat with throat lozenges and/or gargle with warm salt   water.  - Because air was used during the procedure, expelling large amounts of air   from your rectum or belching is normal.  - If a bowel prep was taken, you may not have a bowel movement for 1-3 days.    This is normal.  SYMPTOMS TO WATCH FOR AND REPORT TO YOUR PHYSICIAN:  1. Abdominal pain or bloating, other than gas cramps.  2. Chest pain.  3. Back pain.  4. Signs of infection such as: chills or fever occurring within 24 hours   after the procedure.  5. Rectal bleeding, which would show as bright red, maroon, or black stools.   (A tablespoon of blood from the rectum is not serious, especially if   hemorrhoids are present.)  6. Vomiting.  7. Weakness or dizziness.  GO DIRECTLY TO THE NEAREST EMERGENCY ROOM IF YOU HAVE ANY OF THE FOLLOWING:      Difficulty breathing              Chills and/or fever over 101 F   Persistent vomiting and/or vomiting blood   Severe abdominal pain   Severe chest pain   Black, tarry stools   Bleeding- more than one  tablespoon   Any other symptom or condition that you feel may need urgent attention  Your doctor recommends these additional instructions:  If any biopsies were taken, your doctors clinic will contact you in 1 to 2   weeks with any results.  - Return patient to ICU for ongoing care.   - Advance diet as tolerated.   - Continue present medications.   - Observe patient's clinical course.  For questions, problems or results please call your physician - Brian Trivedi MD at Work:  [phone].  [facility], EMERGENCY ROOM PHONE NUMBER: [phone], [facility]   [phone].  IF A COMPLICATION OR EMERGENCY SITUATION ARISES AND YOU ARE UNABLE TO REACH   YOUR PHYSICIAN - GO DIRECTLY TO THE EMERGENCY ROOM.  Brian Trivedi MD  7/26/2019 7:59:30 AM  This report has been verified and signed electronically.  [employer]

## 2019-07-26 NOTE — EICU
Insomnia persists inspite of benadryl 2 hours ago    Plan:  Attempt alprazolam 0.5 mg 1 tablet once

## 2019-07-26 NOTE — ANESTHESIA POSTPROCEDURE EVALUATION
Anesthesia Post Evaluation    Patient: Irvin Diaz    Procedure(s) Performed: Procedure(s) (LRB):  EGD (ESOPHAGOGASTRODUODENOSCOPY) (N/A)    Final Anesthesia Type: general  Patient location during evaluation: PACU  Patient participation: Yes- Able to Participate  Level of consciousness: awake and alert  Post-procedure vital signs: reviewed and stable  Pain management: adequate  Airway patency: patent  PONV status at discharge: No PONV  Anesthetic complications: no      Cardiovascular status: blood pressure returned to baseline  Respiratory status: unassisted  Hydration status: euvolemic  Follow-up not needed.          Vitals Value Taken Time   /85 7/26/2019  9:02 AM   Temp 37.4 °C (99.3 °F) 7/26/2019  7:15 AM   Pulse 90 7/26/2019  9:34 AM   Resp 29 7/26/2019  9:34 AM   SpO2 100 % 7/26/2019  9:34 AM   Vitals shown include unvalidated device data.      No case tracking events are documented in the log.      Pain/Leno Score: Pain Rating Prior to Med Admin: 4 (7/26/2019  9:24 AM)

## 2019-07-26 NOTE — ANESTHESIA POSTPROCEDURE EVALUATION
Anesthesia Post Evaluation    Patient: Irvin Diaz    Procedure(s) Performed: Procedure(s) (LRB):  EGD (ESOPHAGOGASTRODUODENOSCOPY) (N/A)    Final Anesthesia Type: general  Patient location during evaluation: ICU  Patient participation: Yes- Able to Participate  Level of consciousness: awake and alert  Post-procedure vital signs: reviewed and stable  Pain management: adequate  Airway patency: patent  PONV status at discharge: No PONV  Anesthetic complications: no      Cardiovascular status: blood pressure returned to baseline  Respiratory status: spontaneous ventilation  Hydration status: euvolemic  Follow-up not needed.          Vitals Value Taken Time   /91 7/26/2019  7:00 AM   Temp 37.3 °C (99.1 °F) 7/26/2019  3:15 AM   Pulse 94 7/26/2019  7:43 AM   Resp 18 7/26/2019  7:43 AM   SpO2 97 % 7/26/2019  7:39 AM   Vitals shown include unvalidated device data.      No case tracking events are documented in the log.      Pain/Leno Score: Pain Rating Prior to Med Admin: 4 (7/25/2019 11:22 AM)

## 2019-07-26 NOTE — PLAN OF CARE
Initial Discharge Planning Assesment:  Patient admitted on 7/25/2019    Chart reviewed, Care plan discussed. Discussed care plan with treatment team,  attending Dr. Hill    PCP updated in Epic: Dr. Hill  Pharmacy, updated in Epic: [facility] on [address]    DME at home: none    Current dispo Home with Family  Transportation: Wife to drive home    Case management  to follow.    No anticipated DC needs from CM perspective.       07/26/19 1224   Discharge Assessment   Assessment Type Discharge Planning Assessment   Confirmed/corrected address and phone number on facesheet? Yes   Assessment information obtained from? Patient   Communicated expected length of stay with patient/caregiver yes   Prior to hospitilization cognitive status: Alert/Oriented   Prior to hospitalization functional status: Independent   Current cognitive status: Alert/Oriented   Current Functional Status: Independent   Lives With spouse   Able to Return to Prior Arrangements yes   Is patient able to care for self after discharge? Yes   Who are your caregiver(s) and their phone number(s)? Wife: Florentino [phone]   Patient's perception of discharge disposition home or selfcare   Readmission Within the Last 30 Days no previous admission in last 30 days   Patient currently being followed by outpatient case management? No   Patient currently receives any other outside agency services? No   Equipment Currently Used at Home none   Do you have any problems affording any of your prescribed medications? No   Is the patient taking medications as prescribed? yes   Does the patient have transportation home? Yes   Transportation Anticipated family or friend will provide   Does the patient receive services at the [facility]mad[facility] Clinic? No   Discharge Plan A Home with family   DME Needed Upon Discharge  none   Patient/Family in Agreement with Plan yes

## 2019-07-26 NOTE — ASSESSMENT & PLAN NOTE
Has chronic anemia with mild decrease , likely due to recent bleeding  Will monitor h/h and transfuse as needed

## 2019-07-26 NOTE — BRIEF OP NOTE
EGD:  1) Esophagitis (reflux) with non-bleeding Grade 2 varices in the distal esophagus  2) Diffuse gastritis related to portal hypertension, with scant oozing of blood with instrument contact  3) Duodenitis related to portal; hypertension.    Plan: continue same medications, advance diet as tolerated.

## 2019-07-26 NOTE — PLAN OF CARE
Problem: Adult Inpatient Plan of Care  Goal: Plan of Care Review  Outcome: Ongoing (interventions implemented as appropriate)  Pt on RA. Sats 100%. No distress noted. Will continue to monitor.

## 2019-07-26 NOTE — HOSPITAL COURSE
He did not have further episodes of hematemesis.Had EGD on 7/26 that showed Esophagitis (reflux) with non-bleeding Grade 2 varices in the distal esophagus, Diffuse gastritis related to portal hypertension, with scant oozing of blood with instrument contact, Duodenitis related to portal; hypertension.H/H was stable and he was tolerating diet.He was started on ppi and propanolol and follow with appointments as scheduled.

## 2019-07-26 NOTE — PROGRESS NOTES
[facility] Hospital Medicine  Progress Note    Patient Name: Irvin Diaz  MRN: [id number]  Patient Class: IP- Inpatient   Admission Date: 7/25/2019  Length of Stay: 1 days  Attending Physician: Kathy Hill MD  Primary Care Provider: [facility]        Subjective:     Principal Problem:Upper GI bleed      HPI:  43 year old male with past medical history of alcoholic cirrhosis, htn, thrombocytopenia, esophageal varices with last banding in 10/18 per patient, pulmonary embolism in October 2018 for which he was on anticoagulation but was taken off within 3 months due to the risk of bleeding with esophageal viruses,   came in due to 2 episodes of coffee ground emesis.  He had 1 episode of hematemesis last night and another 1 this morning.  He denied seeing any bright red blood.  He has not had a bowel movement since yesterday and denies melena in the stools.  The patient has not had any further episodes since he has been in the hospital.  He denies drinking alcohol since last year and has not been on NSAIDs since 1 of his GI bleed was attributed to NSAID use.  He also complains of decreased appetite for the last few months as well as weight loss of 20 lb in the last 2-3  Months.  He complains of some left upper abdominal discomfort as well as epigastric discomfort, no back pain, no dysuria or urinary difficulties.  His H&H was stable on admit.  He was started on PPI and octreotide drip and GI was consulted.    Overview/Hospital Course:  He did not have further episodes of hematemesis.Had EGD on 7/26 that showed Esophagitis (reflux) with non-bleeding Grade 2 varices in the distal esophagus, Diffuse gastritis related to portal hypertension, with scant oozing of blood with instrument contact, Duodenitis related to portal; hypertension.    Interval History: feels better today, had bowel movement yesterday with bright red blood on tissue, no further hematemesis.    Review  of Systems   Constitutional: Positive for appetite change and unexpected weight change. Negative for chills and fever.   HENT: Negative for trouble swallowing.    Respiratory: Negative for cough and shortness of breath.    Cardiovascular: Negative for chest pain and leg swelling.   Gastrointestinal: Positive for blood in stool. Negative for abdominal pain, diarrhea, nausea and vomiting.   Genitourinary: Negative for dysuria and hematuria.   Musculoskeletal: Positive for arthralgias and gait problem.   Skin: Negative for rash.   Neurological: Negative for headaches.   Psychiatric/Behavioral: Negative for confusion. The patient is nervous/anxious.      Objective:     Vital Signs (Most Recent):  Temp: 99.3 °F (37.4 °C) (07/26/19 0715)  Pulse: 84 (07/26/19 1315)  Resp: (!) 29 (07/26/19 1100)  BP: (!) 160/82 (07/26/19 1100)  SpO2: 98 % (07/26/19 1315) Vital Signs (24h Range):  Temp:  [99.1 °F (37.3 °C)-99.5 °F (37.5 °C)] 99.3 °F (37.4 °C)  Pulse:  [] 84  Resp:  [12-29] 29  SpO2:  [97 %-100 %] 98 %  BP: (134-163)/(65-95) 160/82     Weight: 79 kg (174 lb 2.6 oz)  Body mass index is 23.62 kg/m².    Intake/Output Summary (Last 24 hours) at 7/26/2019 1610  Last data filed at 7/26/2019 1055  Gross per 24 hour   Intake 2466.17 ml   Output 2700 ml   Net -233.83 ml      Physical Exam   Constitutional: He is oriented to person, place, and time. No distress.   HENT:   Head: Normocephalic and atraumatic.   Eyes: Pupils are equal, round, and reactive to light. EOM are normal.   Neck: Normal range of motion. Neck supple.   Cardiovascular: Regular rhythm.   tachycardic   Pulmonary/Chest: Effort normal and breath sounds normal. No respiratory distress.   Abdominal: Soft. Bowel sounds are normal. He exhibits no distension.   Mild tenderness in left upper quadrant and epigastric area.   Musculoskeletal: Normal range of motion. He exhibits no edema.   Neurological: He is alert and oriented to person, place, and time. No cranial nerve  deficit.   Skin: Skin is warm.   On left side of his anal opening, 3 small openings seen which patient says is chronic, no discharge seen currently,? Sinus tract   Psychiatric: He has a normal mood and affect.   Vitals reviewed.      Significant Labs:   BMP:   Recent Labs   Lab 07/26/19  0340   *      K 3.7      CO2 26   BUN 8   CREATININE 1.2   CALCIUM 9.1   MG 1.4*     CBC:   Recent Labs   Lab 07/25/19  1053 07/25/19  1634 07/25/19  2046 07/26/19  0340   WBC 7.86  --   --  6.79   HGB 10.4* 10.0* 9.5* 8.9*   HCT 30.1* 28.8*  --  27.0*   PLT 77*  --   --  68*       Significant Imaging: I have reviewed all pertinent imaging results/findings within the past 24 hours.      Assessment/Plan:      * Upper GI bleed   h/o cirrhosis and varices  Last egd in 05/19 which showed stable varices, last banding around 10/18  egd showed grade 2 varices, portal htn related gastritis  and duodenitis  Started on ppi and octreotide drips, change ppi to 40 mg iv bid, continue octreotide drip  Gi recs appreciated  Monitor h/h  Added propanolol  Advance to low sodium diet  tx to floor    Hypomagnesemia  Replace oral       Elevated LFTs  Likely due to cirrhosis  Hep panel negative in 05/19  Monitor check direct bilirubin with elevated total bilirubin      Hydronephrosis of right kidney  Mild right hydronephrosis on ct scan with perinephric stranding which could be non specific finding  Patient denies flank pain or dysuria or difficulty urinating  ua was negative  Creatinine normal  Will monitor urine output  Negative  post void residual scan      Benign essential HTN  bp better than yesterday   Will continue lisinopril and monitor vitals  Coreg listed on his home med lsit but patient is not taking it      AVN (avascular necrosis of bone)  Had avn of left hip  Has ortho appointment at end of this month  wbat      Acute blood loss anemia  Has chronic anemia with mild decrease , likely due to recent bleeding  Will monitor  h/h and transfuse as needed      Hypokalemia  Replaced 20 meq by iv and 40 meq by mouth   Monitor labs in am  Improved  Replace mag    Thrombocytopenia  Chronic , likely due to cirrhosis  nromal  b12 levels    Alcoholic cirrhosis  Patient says does not drink  Follow gi as scheduled outpatient  Plan for outpatient cscope outpatient scheduled in 09/19        VTE Risk Mitigation (From admission, onward)        Ordered     IP VTE LOW RISK PATIENT  Once      07/25/19 1447     Place sequential compression device  Until discontinued      07/25/19 1447                Katyh Hill MD  Department of Hospital Medicine   [facility]

## 2019-07-26 NOTE — NURSING TRANSFER
Nursing Transfer Note      7/26/2019     Transfer to 325    Transfer via wheelchair    Transfer with patient belongings     Transported by Romi Vargas, and transport staff    Medicines sent: octreotide on continuous infusion    Chart send with patient: yes    Notified: Rakesh BERG    Patient reassessed upon arrival to room 325 by Rakesh

## 2019-07-26 NOTE — ASSESSMENT & PLAN NOTE
Mild right hydronephrosis on ct scan with perinephric stranding which could be non specific finding  Patient denies flank pain or dysuria or difficulty urinating  ua was negative  Creatinine normal  Will monitor urine output  Negative  post void residual scan

## 2019-07-26 NOTE — SUBJECTIVE & OBJECTIVE
Interval History: feels better today, had bowel movement yesterday with bright red blood on tissue, no further hematemesis.    Review of Systems   Constitutional: Positive for appetite change and unexpected weight change. Negative for chills and fever.   HENT: Negative for trouble swallowing.    Respiratory: Negative for cough and shortness of breath.    Cardiovascular: Negative for chest pain and leg swelling.   Gastrointestinal: Positive for blood in stool. Negative for abdominal pain, diarrhea, nausea and vomiting.   Genitourinary: Negative for dysuria and hematuria.   Musculoskeletal: Positive for arthralgias and gait problem.   Skin: Negative for rash.   Neurological: Negative for headaches.   Psychiatric/Behavioral: Negative for confusion. The patient is nervous/anxious.      Objective:     Vital Signs (Most Recent):  Temp: 99.3 °F (37.4 °C) (07/26/19 0715)  Pulse: 84 (07/26/19 1315)  Resp: (!) 29 (07/26/19 1100)  BP: (!) 160/82 (07/26/19 1100)  SpO2: 98 % (07/26/19 1315) Vital Signs (24h Range):  Temp:  [99.1 °F (37.3 °C)-99.5 °F (37.5 °C)] 99.3 °F (37.4 °C)  Pulse:  [] 84  Resp:  [12-29] 29  SpO2:  [97 %-100 %] 98 %  BP: (134-163)/(65-95) 160/82     Weight: 79 kg (174 lb 2.6 oz)  Body mass index is 23.62 kg/m².    Intake/Output Summary (Last 24 hours) at 7/26/2019 1610  Last data filed at 7/26/2019 1055  Gross per 24 hour   Intake 2466.17 ml   Output 2700 ml   Net -233.83 ml      Physical Exam   Constitutional: He is oriented to person, place, and time. No distress.   HENT:   Head: Normocephalic and atraumatic.   Eyes: Pupils are equal, round, and reactive to light. EOM are normal.   Neck: Normal range of motion. Neck supple.   Cardiovascular: Regular rhythm.   tachycardic   Pulmonary/Chest: Effort normal and breath sounds normal. No respiratory distress.   Abdominal: Soft. Bowel sounds are normal. He exhibits no distension.   Mild tenderness in left upper quadrant and epigastric area.    Musculoskeletal: Normal range of motion. He exhibits no edema.   Neurological: He is alert and oriented to person, place, and time. No cranial nerve deficit.   Skin: Skin is warm.   On left side of his anal opening, 3 small openings seen which patient says is chronic, no discharge seen currently,? Sinus tract   Psychiatric: He has a normal mood and affect.   Vitals reviewed.      Significant Labs:   BMP:   Recent Labs   Lab 07/26/19  0340   *      K 3.7      CO2 26   BUN 8   CREATININE 1.2   CALCIUM 9.1   MG 1.4*     CBC:   Recent Labs   Lab 07/25/19  1053 07/25/19  1634 07/25/19  2046 07/26/19  0340   WBC 7.86  --   --  6.79   HGB 10.4* 10.0* 9.5* 8.9*   HCT 30.1* 28.8*  --  27.0*   PLT 77*  --   --  68*       Significant Imaging: I have reviewed all pertinent imaging results/findings within the past 24 hours.

## 2019-07-26 NOTE — PLAN OF CARE
Problem: Adult Inpatient Plan of Care  Goal: Plan of Care Review  Pt had EGD at beginning of shift and tolerated well. No nausea or anxiety noted this shift. Pt resumed on clear liquid and advanced and tolerated 2 g sodium diet at lunch. Pt has not had any other bloody bowel movements. Pt urinating per urinal. Pt stands and ambulates independently with mild limp due to left hip fracture. Blood pressures trending lower than yesterday with addition of oral propranolol. Pt currently on room air with octreotide infusing. Pt stable for transport to med surg.

## 2019-07-26 NOTE — EICU
Bloody bowel movement noted twice since 430pm today    Complains of poor sleep inspite of alprazolam 0.25 mg   Plan:  Check HB now  Tab.benadryl 25 mg oral once

## 2019-07-27 VITALS
HEIGHT: 72 IN | DIASTOLIC BLOOD PRESSURE: 85 MMHG | HEART RATE: 81 BPM | TEMPERATURE: 98 F | SYSTOLIC BLOOD PRESSURE: 136 MMHG | OXYGEN SATURATION: 97 % | BODY MASS INDEX: 23.59 KG/M2 | RESPIRATION RATE: 18 BRPM | WEIGHT: 174.19 LBS

## 2019-07-27 LAB
ALBUMIN SERPL BCP-MCNC: 2.8 G/DL (ref 3.5–5.2)
ALP SERPL-CCNC: 102 U/L (ref 55–135)
ALT SERPL W/O P-5'-P-CCNC: 57 U/L (ref 10–44)
ANION GAP SERPL CALC-SCNC: 8 MMOL/L (ref 8–16)
AST SERPL-CCNC: 137 U/L (ref 10–40)
BASOPHILS # BLD AUTO: 0.03 K/UL (ref 0–0.2)
BASOPHILS NFR BLD: 0.4 % (ref 0–1.9)
BILIRUB DIRECT SERPL-MCNC: 2.9 MG/DL (ref 0.1–0.3)
BILIRUB SERPL-MCNC: 5.9 MG/DL (ref 0.1–1)
BUN SERPL-MCNC: 11 MG/DL (ref 6–20)
CALCIUM SERPL-MCNC: 8.9 MG/DL (ref 8.7–10.5)
CHLORIDE SERPL-SCNC: 103 MMOL/L (ref 95–110)
CO2 SERPL-SCNC: 26 MMOL/L (ref 23–29)
CREAT SERPL-MCNC: 1.2 MG/DL (ref 0.5–1.4)
DIFFERENTIAL METHOD: ABNORMAL
EOSINOPHIL # BLD AUTO: 0.1 K/UL (ref 0–0.5)
EOSINOPHIL NFR BLD: 1.5 % (ref 0–8)
ERYTHROCYTE [DISTWIDTH] IN BLOOD BY AUTOMATED COUNT: 15.6 % (ref 11.5–14.5)
EST. GFR  (AFRICAN AMERICAN): >60 ML/MIN/1.73 M^2
EST. GFR  (NON AFRICAN AMERICAN): >60 ML/MIN/1.73 M^2
GLUCOSE SERPL-MCNC: 125 MG/DL (ref 70–110)
HCT VFR BLD AUTO: 26.3 % (ref 40–54)
HGB BLD-MCNC: 8.9 G/DL (ref 14–18)
IMM GRANULOCYTES # BLD AUTO: 0.02 K/UL (ref 0–0.04)
IMM GRANULOCYTES NFR BLD AUTO: 0.2 % (ref 0–0.5)
LYMPHOCYTES # BLD AUTO: 1.8 K/UL (ref 1–4.8)
LYMPHOCYTES NFR BLD: 21.3 % (ref 18–48)
MAGNESIUM SERPL-MCNC: 1.6 MG/DL (ref 1.6–2.6)
MCH RBC QN AUTO: 35.7 PG (ref 27–31)
MCHC RBC AUTO-ENTMCNC: 33.8 G/DL (ref 32–36)
MCV RBC AUTO: 106 FL (ref 82–98)
MONOCYTES # BLD AUTO: 0.7 K/UL (ref 0.3–1)
MONOCYTES NFR BLD: 8.9 % (ref 4–15)
NEUTROPHILS # BLD AUTO: 5.6 K/UL (ref 1.8–7.7)
NEUTROPHILS NFR BLD: 67.7 % (ref 38–73)
NRBC BLD-RTO: 0 /100 WBC
PLATELET # BLD AUTO: 74 K/UL (ref 150–350)
PMV BLD AUTO: 12.1 FL (ref 9.2–12.9)
POTASSIUM SERPL-SCNC: 3.8 MMOL/L (ref 3.5–5.1)
PROT SERPL-MCNC: 8.2 G/DL (ref 6–8.4)
RBC # BLD AUTO: 2.49 M/UL (ref 4.6–6.2)
SODIUM SERPL-SCNC: 137 MMOL/L (ref 136–145)
WBC # BLD AUTO: 8.2 K/UL (ref 3.9–12.7)

## 2019-07-27 PROCEDURE — 63600175 PHARM REV CODE 636 W HCPCS: Performed by: INTERNAL MEDICINE

## 2019-07-27 PROCEDURE — 80053 COMPREHEN METABOLIC PANEL: CPT

## 2019-07-27 PROCEDURE — 82248 BILIRUBIN DIRECT: CPT

## 2019-07-27 PROCEDURE — 99239 PR HOSPITAL DISCHARGE DAY,>30 MIN: ICD-10-PCS | Mod: ,,, | Performed by: INTERNAL MEDICINE

## 2019-07-27 PROCEDURE — C9113 INJ PANTOPRAZOLE SODIUM, VIA: HCPCS | Performed by: INTERNAL MEDICINE

## 2019-07-27 PROCEDURE — 99239 HOSP IP/OBS DSCHRG MGMT >30: CPT | Mod: ,,, | Performed by: INTERNAL MEDICINE

## 2019-07-27 PROCEDURE — 85025 COMPLETE CBC W/AUTO DIFF WBC: CPT

## 2019-07-27 PROCEDURE — 36415 COLL VENOUS BLD VENIPUNCTURE: CPT

## 2019-07-27 PROCEDURE — 83735 ASSAY OF MAGNESIUM: CPT

## 2019-07-27 PROCEDURE — 25000003 PHARM REV CODE 250: Performed by: INTERNAL MEDICINE

## 2019-07-27 RX ORDER — PANTOPRAZOLE SODIUM 40 MG/1
40 TABLET, DELAYED RELEASE ORAL DAILY
Qty: 30 TABLET | Refills: 1 | Status: ON HOLD | OUTPATIENT
Start: 2019-07-27 | End: 2020-04-11 | Stop reason: SDUPTHER

## 2019-07-27 RX ORDER — PROPRANOLOL HYDROCHLORIDE 10 MG/1
10 TABLET ORAL 3 TIMES DAILY
Qty: 90 TABLET | Refills: 0 | Status: SHIPPED | OUTPATIENT
Start: 2019-07-27 | End: 2020-04-08 | Stop reason: CLARIF

## 2019-07-27 RX ADMIN — PROPRANOLOL HYDROCHLORIDE 10 MG: 10 TABLET ORAL at 09:07

## 2019-07-27 RX ADMIN — PANTOPRAZOLE SODIUM 40 MG: 40 INJECTION, POWDER, FOR SOLUTION INTRAVENOUS at 09:07

## 2019-07-27 RX ADMIN — PROPRANOLOL HYDROCHLORIDE 10 MG: 10 TABLET ORAL at 02:07

## 2019-07-27 RX ADMIN — Medication 400 MG: at 09:07

## 2019-07-27 RX ADMIN — LISINOPRIL 10 MG: 10 TABLET ORAL at 09:07

## 2019-07-27 NOTE — PLAN OF CARE
Problem: Adult Inpatient Plan of Care  Goal: Patient-Specific Goal (Individualization)  Outcome: Ongoing (interventions implemented as appropriate)  Pt free of falls/trauma/injuries this shift.VSS.Octreotide infusing, medication administered as perscribed. PRN xanax administered for sleep promotion. Pt stated he had abloody BM last night. Pt instructed to call nurse if he has another BM so it can be seen and document the characteristics. NO N/V this shift. Patient tolerating POC well. Pt verbalizes understanding of care. Pt denies any chest pain, SOB, or any other discomforts at this time. Will continue to monitor.

## 2019-07-27 NOTE — ASSESSMENT & PLAN NOTE
bp improved   Continue lisinopril on dc  Coreg listed on his home med list but patient is not taking it

## 2019-07-27 NOTE — ASSESSMENT & PLAN NOTE
h/o cirrhosis and varices  Last egd in 05/19 which showed stable varices, last banding around 10/18  egd showed grade 2 varices, portal htn related gastritis  and duodenitis  Started on ppi and octreotide drips, which have been dcd  Gi recs appreciated  Monitor h/h, stable   Added propanolol  Advance to low sodium diet  Dc on oral ppi daily

## 2019-07-27 NOTE — PLAN OF CARE
Problem: Adult Inpatient Plan of Care  Goal: Plan of Care Review  Outcome: Ongoing (interventions implemented as appropriate)  Discharge instructions reviewed with patient . No s/s of pain or discomfort at this moment. Bed wheels locked and bed in lowest  Position. IV D/C's with catheter still intact. patient tolerated well. Will continue to monitor patient .

## 2019-07-27 NOTE — PLAN OF CARE
Patient is discharged home.     No CM needs for discharge.     Family will transport the patient home.      07/27/19 1014   Final Note   Assessment Type Final Discharge Note   Anticipated Discharge Disposition Home   What phone number can be called within the next 1-3 days to see how you are doing after discharge? [phone]   Right [facility] Referral Info   Post Acute Recommendation No Care

## 2019-07-27 NOTE — PROGRESS NOTES
Subjective:       Patient ID: Irvin Diaz is a 43 y.o. male.    Chief Complaint:  Hematemesis (Pt reports coffee ground emesis X 1 last night.Report LUQ abdominal pain & nausea. Reports past ETOH related GI bleed. Last ETOH use October. )       History of Present Illness  Pt denies any gi bleed    Review of Systems  Cardiovascular: negative      Objective:     Vitals:    07/27/19 0701 07/27/19 0733 07/27/19 0800 07/27/19 1211   BP:  133/75  136/85   BP Location:    Left arm   Patient Position:    Lying   Pulse: 76 77 77 81   Resp:  18  18   Temp:  97.6 °F (36.4 °C)  98.4 °F (36.9 °C)   TempSrc:    Oral   SpO2:  99%  97%   Weight:       Height:          Abdomen: soft, non-tender; bowel sounds normal; no masses,  no organomegaly    Data Review   Recent Results (from the past 336 hour(s))   CBC auto differential    Collection Time: 07/27/19  5:16 AM   Result Value Ref Range    WBC 8.20 3.90 - 12.70 K/uL    Hemoglobin 8.9 (L) 14.0 - 18.0 g/dL    Hematocrit 26.3 (L) 40.0 - 54.0 %    Platelets 74 (L) 150 - 350 K/uL   CBC auto differential    Collection Time: 07/26/19  3:40 AM   Result Value Ref Range    WBC 6.79 3.90 - 12.70 K/uL    Hemoglobin 8.9 (L) 14.0 - 18.0 g/dL    Hematocrit 27.0 (L) 40.0 - 54.0 %    Platelets 68 (L) 150 - 350 K/uL   CBC auto differential    Collection Time: 07/25/19 10:53 AM   Result Value Ref Range    WBC 7.86 3.90 - 12.70 K/uL    Hemoglobin 10.4 (L) 14.0 - 18.0 g/dL    Hematocrit 30.1 (L) 40.0 - 54.0 %    Platelets 77 (L) 150 - 350 K/uL      Recent Labs   Lab 07/25/19  1053   INR 1.2     Recent Labs   Lab 07/25/19  1053 07/26/19  0340 07/27/19  0516    137 137   K 2.9* 3.7 3.8   CL 96 102 103   CO2 28 26 26   BUN 7 8 11   CREATININE 1.1 1.2 1.2   CALCIUM 9.7 9.1 8.9   PROT 10.3* 8.4 8.2   BILITOT 7.7* 7.1* 5.9*   ALKPHOS 128 98 102   ALT 98* 70* 57*   * 174* 137*      Lab Results   Component Value Date    HEPAIGM Negative 05/11/2019    HEPBIGM Negative 05/11/2019    HEPCAB  Negative 05/11/2019    No results found for: AFP   No results found for: CEA   Recent Labs   Lab 07/25/19  1053   INR 1.2        Assessment:     1. Upper GI bleed    2. Hypokalemia    3. Portal hypertension    4. Bleeding esophageal varices, unspecified esophageal varices type    5. Alcoholic cirrhosis of liver without ascites        Plan:     1) monitor h/h closely  2) Propanolo

## 2019-07-27 NOTE — DISCHARGE SUMMARY
[facility] Hospital Medicine  Discharge Summary      Patient Name: Irvin Diaz  MRN: [id number]  Admission Date: 7/25/2019  Hospital Length of Stay: 2 days  Discharge Date and Time: 7/27/2019  3:14 PM  Attending Physician: No att. providers found   Discharging Provider: Kathy Hill MD  Primary Care Provider:       HPI:   43 year old male with past medical history of alcoholic cirrhosis, htn, thrombocytopenia, esophageal varices with last banding in 10/18 per patient, pulmonary embolism in October 2018 for which he was on anticoagulation but was taken off within 3 months due to the risk of bleeding with esophageal viruses,   came in due to 2 episodes of coffee ground emesis.  He had 1 episode of hematemesis last night and another 1 this morning.  He denied seeing any bright red blood.  He has not had a bowel movement since yesterday and denies melena in the stools.  The patient has not had any further episodes since he has been in the hospital.  He denies drinking alcohol since last year and has not been on NSAIDs since 1 of his GI bleed was attributed to NSAID use.  He also complains of decreased appetite for the last few months as well as weight loss of 20 lb in the last 2-3  Months.  He complains of some left upper abdominal discomfort as well as epigastric discomfort, no back pain, no dysuria or urinary difficulties.  His H&H was stable on admit.  He was started on PPI and octreotide drip and GI was consulted.    Procedure(s) (LRB):  EGD (ESOPHAGOGASTRODUODENOSCOPY) (N/A)      Hospital Course:   He did not have further episodes of hematemesis.Had EGD on 7/26 that showed Esophagitis (reflux) with non-bleeding Grade 2 varices in the distal esophagus, Diffuse gastritis related to portal hypertension, with scant oozing of blood with instrument contact, Duodenitis related to portal; hypertension.H/H was stable and he was tolerating diet.He was started  on ppi and propanolol and follow with appointments as scheduled.     Consults:   Consults (From admission, onward)        Status Ordering Provider     Inpatient consult to Gastroenterology  Once     Provider:  Brian Trivedi MD    Completed LUISA FLOYD          * Upper GI bleed   h/o cirrhosis and varices  Last egd in 05/19 which showed stable varices, last banding around 10/18  egd showed grade 2 varices, portal htn related gastritis  and duodenitis  Started on ppi and octreotide drips, which have been dcd  Gi recs appreciated  Monitor h/h, stable   Added propanolol  Advance to low sodium diet  Dc on oral ppi daily    Hypomagnesemia  Replaced oral       Elevated LFTs  Likely due to cirrhosis  Hep panel negative in 05/19  Improved elevated t bilirubin      Hydronephrosis of right kidney  Mild right hydronephrosis on ct scan with perinephric stranding which could be non specific finding  Patient denies flank pain or dysuria or difficulty urinating  ua was negative  Creatinine normal  Good  urine output  Negative  post void residual scan      Benign essential HTN  bp improved   Continue lisinopril on dc  Coreg listed on his home med list but patient is not taking it      AVN (avascular necrosis of bone)  Had avn of left hip  Has ortho appointment at end of this month  wbat      Acute blood loss anemia  Has chronic anemia with mild decrease , likely due to recent bleeding  H/h stable      Hypokalemia  Replaced 20 meq by iv and 40 meq by mouth   Improved  Replaced mag    Thrombocytopenia  Chronic , likely due to cirrhosis  nromal  b12 levels    Alcoholic cirrhosis  Patient says does not drink  Follow gi as scheduled outpatient  Plan for outpatient cscope outpatient scheduled in 09/19        Final Active Diagnoses:    Diagnosis Date Noted POA    PRINCIPAL PROBLEM:  Upper GI bleed [K92.2] 07/25/2019 Yes    Hypomagnesemia [E83.42] 07/26/2019 Yes    Benign essential HTN [I10] 07/25/2019 Yes    Hydronephrosis of  right kidney [N13.30] 07/25/2019 Yes    Elevated LFTs [R94.5] 07/25/2019 Yes    AVN (avascular necrosis of bone) [M87.00] 05/11/2019 Yes    Thrombocytopenia [D69.6] 05/11/2019 Yes    Alcoholic cirrhosis [K70.30] 05/11/2019 Yes    Acute blood loss anemia [D62] 05/11/2019 Yes    Hypokalemia [E87.6] 05/11/2019 Yes      Problems Resolved During this Admission:       Discharged Condition: stable    Disposition: Home or Self Care    Follow Up:  Follow-up Information     Schedule an appointment as soon as possible for a visit with [facility] and [facility].    Why:  As needed  Contact information:  [address] [place] [address] [phone]           Please follow up.    Contact information:  keep other scheduled appointments               Patient Instructions:      Diet Cardiac   Order Comments: Low sodium diet     Activity as tolerated       Significant Diagnostic Studies:   Lab Results   Component Value Date    WBC 8.20 07/27/2019    HGB 8.9 (L) 07/27/2019    HCT 26.3 (L) 07/27/2019     (H) 07/27/2019    PLT 74 (L) 07/27/2019     CMP  Sodium   Date Value Ref Range Status   07/27/2019 137 136 - 145 mmol/L Final   09/02/2018 135 134 - 144 mmol/L      Potassium   Date Value Ref Range Status   07/27/2019 3.8 3.5 - 5.1 mmol/L Final     Chloride   Date Value Ref Range Status   07/27/2019 103 95 - 110 mmol/L Final   09/02/2018 102 98 - 110 mmol/L      CO2   Date Value Ref Range Status   07/27/2019 26 23 - 29 mmol/L Final     Glucose   Date Value Ref Range Status   07/27/2019 125 (H) 70 - 110 mg/dL Final   09/02/2018 99 70 - 99 mg/dL      BUN, Bld   Date Value Ref Range Status   07/27/2019 11 6 - 20 mg/dL Final     Creatinine   Date Value Ref Range Status   07/27/2019 1.2 0.5 - 1.4 mg/dL Final   09/02/2018 0.91 0.60 - 1.40 mg/dL      Calcium   Date Value Ref Range Status   07/27/2019 8.9 8.7 - 10.5 mg/dL Final     Total Protein   Date Value Ref Range Status   07/27/2019 8.2 6.0 - 8.4  g/dL Final     Albumin   Date Value Ref Range Status   07/27/2019 2.8 (L) 3.5 - 5.2 g/dL Final     Total Bilirubin   Date Value Ref Range Status   07/27/2019 5.9 (H) 0.1 - 1.0 mg/dL Final     Comment:     For infants and newborns, interpretation of results should be based  on gestational age, weight and in agreement with clinical  observations.  Premature Infant recommended reference ranges:  Up to 24 hours.............<8.0 mg/dL  Up to 48 hours............<12.0 mg/dL  3-5 days..................<15.0 mg/dL  6-29 days.................<15.0 mg/dL       Alkaline Phosphatase   Date Value Ref Range Status   07/27/2019 102 55 - 135 U/L Final     AST   Date Value Ref Range Status   07/27/2019 137 (H) 10 - 40 U/L Final     ALT   Date Value Ref Range Status   07/27/2019 57 (H) 10 - 44 U/L Final     Anion Gap   Date Value Ref Range Status   07/27/2019 8 8 - 16 mmol/L Final     eGFR if    Date Value Ref Range Status   07/27/2019 >60 >60 mL/min/1.73 m^2 Final     eGFR if non    Date Value Ref Range Status   07/27/2019 >60 >60 mL/min/1.73 m^2 Final     Comment:     Calculation used to obtain the estimated glomerular filtration  rate (eGFR) is the CKD-EPI equation.        CT Abdomen Pelvis With Contrast  Narrative: EXAMINATION:  CT ABDOMEN PELVIS WITH CONTRAST    CLINICAL HISTORY:  Abdominal distension;GI bleeding;    TECHNIQUE:  Low dose axial images, sagittal and coronal reformations were obtained from the lung bases to the pubic symphysis following the IV administration of 75 mL of Omnipaque 350 .  Oral contrast was not given.    COMPARISON:  None.    FINDINGS:  Images of the lower thorax are remarkable.    The liver is hypoattenuating, may reflect steatosis, correlation with LFTs recommended.  The liver has a somewhat nodular contour, finding is nonspecific.  There is a vague focus of low attenuation adjacent to the gallbladder fossa, could reflect focus of fatty infiltration.  Additionally,  ill-defined hypoattenuation is noted along the gallbladder fossa within the posterior aspect of the right hepatic lobe, same differential.  The spleen, pancreas, adrenal glands and gallbladder are grossly unremarkable.  There is no biliary dilation.  Several nonenlarged abdominal lymph nodes are noted.  The portal vein, splenic vein, SMV, celiac axis and SMA all are grossly patent.  Pancreatic duct is not dilated.  Surgical changes are noted of the right upper quadrant.    The kidneys enhance symmetrically without nephrolithiasis.  There is mild right hydronephrosis, no left hydronephrosis.  There is bilateral perinephric fat stranding, right greater than left.  The bilateral ureters are unremarkable without calculi seen.  The urinary bladder is unremarkable.  The prostate is prominent.    There are a few scattered colonic diverticula.  Surgical changes are noted of the right colon.  The appendix is not identified.  No pericecal inflammation.  The small bowel is grossly unremarkable.  No focal organized pelvic fluid collection.  There may be subtle fat stranding adjacent to the mid to distal descending colon in the region of several diverticula versus nonspecific mesenteric edema.  There is atherosclerotic calcification of the aorta and its branches.    Degenerative changes are noted of the spine.  No significant inguinal lymphadenopathy.  There is irregularity, sclerosis, and partial destruction involving the left femoral head, correlation with any history of previous injury versus osteonecrosis/infection recommended.  There is a fat containing umbilical hernia.  Anterior abdominal wall varices noted as well as prominent anterior mesenteric vessels.  Impression: 1. Mild right hydronephrosis noting right perinephric fat stranding greater than left.  Finding is nonspecific however sequela of recently passed calculus or infection are considerations and correlation with urinalysis is recommended.  2. Findings  suggesting hepatic steatosis noting nodular contour to the liver, correlation with any history of cirrhosis recommended.  Prominent venous varices within the anterior abdomen anterior abdominal wall may reflect sequela of portal hypertension.  3. Vague foci of low attenuation about the gallbladder fossa could reflect focal fatty infiltration although nonspecific and suboptimally evaluated.  Nonemergent, outpatient MRI could be performed for further evaluation as warranted to confirm fat deposition.  4. Slight indistinctness about a few diverticula involving the mid to distal descending colon.  This is likely on the basis of scattered strand-like fluid in the region as a generalized process rather than diverticulitis although correlation with any focal tenderness in the region is recommended as early changes of diverticulitis are a consideration.  5. Surgical changes of the colon.  6. Several additional findings above.    Electronically signed by: Brady Hope MD  Date:    07/25/2019  Time:    12:15        Pending Diagnostic Studies:     None         Medications:  Reconciled Home Medications:      Medication List      START taking these medications    pantoprazole 40 MG tablet  Commonly known as:  PROTONIX  Take 1 tablet (40 mg total) by mouth once daily.     propranolol 10 MG tablet  Commonly known as:  INDERAL  Take 1 tablet (10 mg total) by mouth 3 (three) times daily.        CONTINUE taking these medications    lisinopril 10 MG tablet  Take 10 mg by mouth once daily.            Indwelling Lines/Drains at time of discharge:   Lines/Drains/Airways          None          Time spent on the discharge of patient: 35  minutes  Patient was seen and examined on the date of discharge and determined to be suitable for discharge.         Kathy Hill MD  Department of Hospital Medicine  [facility]

## 2019-07-27 NOTE — ASSESSMENT & PLAN NOTE
Mild right hydronephrosis on ct scan with perinephric stranding which could be non specific finding  Patient denies flank pain or dysuria or difficulty urinating  ua was negative  Creatinine normal  Good  urine output  Negative  post void residual scan

## 2019-07-29 NOTE — PHYSICIAN QUERY
PT Name: Irvin Diaz  MR #: [id number]     Physician Query Form - Etiology of Condition Clarification      CDS Jodie Matamoros, RN, BSN        Contact Information:  [phone]    [email or web address]         This form is a permanent document in the medical record.     Query Date: July 29, 2019    By submitting this query, we are merely seeking further clarification of documentation.  Please utilize your independent clinical judgment when addressing the question(s) below.     The medical record contains the following:    Findings Supporting Clinical Information Location in Medical Record   Upper GI bleed / hematemesis  43 year old male with past medical history of alcoholic cirrhosis, htn, thrombocytopenia, esophageal varices with last banding in 10/18 per patient, pulmonary embolism in October 2018 for which he was on anticoagulation but was taken off within 3 months due to the risk of bleeding with esophageal viruses,   came in due to 2 episodes of coffee ground emesis.  Started on ppi and octreotide drip    Impression:    - Grade II esophageal varices.                        - [place] Grade B reflux esophagitis.                        - Chronic gastritis.                        - Erythematous duodenopathy.                        - No specimens collected    Had EGD on 7/26 that showed Esophagitis (reflux) with non-bleeding Grade 2 varices in the distal esophagus, Diffuse gastritis related to portal hypertension, with scant oozing of blood with instrument contact, Duodenitis related to portal; started on ppi and propanolol and follow with appointments  H&P                7/26 --- EGD             Discharge summary      Please document your best medical opinion regarding the etiology of   Upper GI bleed / hematemesis   for which treatment is/was directed.       _____   Esophageal varices     _____   LA Grade B reflux esophagitis     __x___   Chronic gastritis related to portal hypertension     _____   Duodenitis  related to portal hypertension     _____   Other, (please specify) ________________________________________        [  ] Clinically Undetermined     Please document in your progress notes daily for the duration of treatment, until resolved, and include in your discharge summary.

## 2019-07-30 ENCOUNTER — PATIENT OUTREACH (OUTPATIENT)
Dept: ADMINISTRATIVE | Facility: CLINIC | Age: 44
End: 2019-07-30

## 2019-07-30 NOTE — PATIENT INSTRUCTIONS
When You Have Gastrointestinal (GI) Bleeding    Blood in your vomit or stool can be a sign of gastrointestinal (GI) bleeding. GI bleeding can be scary. But the cause may not be serious. You should always see a doctor if GI bleeding occurs.  The GI tract  The GI tract is the path through which food travels in the body. Food passes from the mouth down the esophagus (the tube from the mouth to the stomach). Food begins to break down in the stomach. It then moves through the duodenum, the first part of the small intestine. Nutrients are absorbed as food travels through the small intestine. What is left passes into the colon (large intestine) as waste. The colon removes water from the waste. Waste continues from the colon to the rectum (where stool is stored). Waste then leaves the body through the anus.  Causes of GI bleeding  GI bleeding can be caused by many different problems. Some of the more common causes include:  · Swollen veins in the anus (hemorrhoids)  · Swollen veins in the esophagus (varices)  · Sore on the lining of the GI tract (ulcer)  · Cuts or scrapes in the mouth or throat  · Infection caused by germs such as bacteria or parasites  · Food allergies, such as milk allergy in young children  · Medicines  · Inflammation of the GI tract (gastritis or esophagitis)  · Colitis (Crohn's disease or ulcerative colitis)  · Cancer (tumors or polyps)  · Abnormal pouches in the colon (diverticula)  · Tears in the esophagus or anus  · Nosebleed  · Abnormal blood vessels in the GI tract (angiodysplasia)  Diagnosing the cause of blood in stool  If blood is coming out in your stool, you may have a lower GI tract problem or a very fast upper GI tract bleed. Bleeding from the GI tract can be bright red. Or it may look dark and tarry. Tests may also find blood in your stool that cant be seen with the eye (occult blood). To find out the cause, tests that may be ordered include:  · Blood tests. A blood sample is taken and  sent to a lab for exam.  · Hemoccult test. Checks a stool sample for blood.  · Stool culture. Checks a stool sample for bacteria or parasites.  · X-ray, ultrasound, or CT scan. Imaging tests that take pictures of the digestive tract.  · Colonoscopy or sigmoidoscopy. This test uses a flexible tube with a tiny camera. The tube is inserted through your anus into your rectum to see the inside of your colon. Your provider can also take a tiny tissue sample (biopsy) and treat a bleeding source  Diagnosing the cause of blood in vomit  If you are vomiting blood or something that looks like coffee grounds, you may have an upper GI tract problem. To find the cause, tests that may be done include:  · Upper Endoscopy. A flexible tube with a tiny camera is inserted through your mouth and throat to see inside your upper GI tract. This lets your provider take a tiny tissue sample (biopsy) and treat a bleeding source.  · Nasogastric lavage. This can tell if you have upper GI or lower GI bleeding.  · X-ray, ultrasound, or CT scan. Imaging tests that take pictures of your digestive tract.  · Upper GI series. X-rays of the upper part of your GI tract taken from inside your body.  · Enteroscopy. This sends a flexible tube or a small, swallowed capsule camera into your small intestine.  When to call your healthcare provider  Call your healthcare provider right away if you have any of the following:  · Bleeding from your mouth or anus that can't be stopped  · Fever of 100.4°F (38.0°) or higher  · Bleeding along with feeling lightheaded or dizzy  · Signs of fluid loss (dehydration). These include a dry, sticky mouth, decreased urine output; and very dark urine.  · Belly (abdominal) pain   Date Last Reviewed: 7/1/2016  © 1914-7272 [employer]. [address] [place] [address], [place] [address]. All rights reserved. This information is not intended as a substitute for professional medical care. Always follow your healthcare  professional's instructions.

## 2019-08-18 ENCOUNTER — HOSPITAL ENCOUNTER (EMERGENCY)
Facility: OTHER | Age: 44
Discharge: HOME | End: 2019-08-19
Attending: EMERGENCY MEDICINE
Payer: MEDICAID

## 2019-08-18 DIAGNOSIS — K76.9 LIVER LESION: ICD-10-CM

## 2019-08-18 DIAGNOSIS — K85.90 ACUTE PANCREATITIS, UNSPECIFIED COMPLICATION STATUS, UNSPECIFIED PANCREATITIS TYPE: Primary | ICD-10-CM

## 2019-08-18 DIAGNOSIS — K52.9 COLITIS: ICD-10-CM

## 2019-08-18 LAB
ALBUMIN SERPL BCP-MCNC: 3.7 G/DL (ref 3.5–5.2)
ALP SERPL-CCNC: 93 U/L (ref 55–135)
ALT SERPL W/O P-5'-P-CCNC: 50 U/L (ref 10–44)
ANION GAP SERPL CALC-SCNC: 18 MMOL/L (ref 8–16)
AST SERPL-CCNC: 124 U/L (ref 10–40)
BASOPHILS # BLD AUTO: 0.05 K/UL (ref 0–0.2)
BASOPHILS NFR BLD: 0.5 % (ref 0–1.9)
BILIRUB SERPL-MCNC: 4.5 MG/DL (ref 0.1–1)
BUN SERPL-MCNC: 13 MG/DL (ref 6–20)
CALCIUM SERPL-MCNC: 9.7 MG/DL (ref 8.7–10.5)
CHLORIDE SERPL-SCNC: 94 MMOL/L (ref 95–110)
CO2 SERPL-SCNC: 24 MMOL/L (ref 23–29)
CREAT SERPL-MCNC: 1.3 MG/DL (ref 0.5–1.4)
DIFFERENTIAL METHOD: ABNORMAL
EOSINOPHIL # BLD AUTO: 0.1 K/UL (ref 0–0.5)
EOSINOPHIL NFR BLD: 0.7 % (ref 0–8)
ERYTHROCYTE [DISTWIDTH] IN BLOOD BY AUTOMATED COUNT: 12.5 % (ref 11.5–14.5)
EST. GFR  (AFRICAN AMERICAN): >60 ML/MIN/1.73 M^2
EST. GFR  (NON AFRICAN AMERICAN): >60 ML/MIN/1.73 M^2
GLUCOSE SERPL-MCNC: 111 MG/DL (ref 70–110)
HCT VFR BLD AUTO: 32.2 % (ref 40–54)
HGB BLD-MCNC: 11 G/DL (ref 14–18)
IMM GRANULOCYTES # BLD AUTO: 0.02 K/UL (ref 0–0.04)
IMM GRANULOCYTES NFR BLD AUTO: 0.2 % (ref 0–0.5)
LIPASE SERPL-CCNC: 484 U/L (ref 4–60)
LYMPHOCYTES # BLD AUTO: 1.5 K/UL (ref 1–4.8)
LYMPHOCYTES NFR BLD: 15.9 % (ref 18–48)
MCH RBC QN AUTO: 36.8 PG (ref 27–31)
MCHC RBC AUTO-ENTMCNC: 34.2 G/DL (ref 32–36)
MCV RBC AUTO: 108 FL (ref 82–98)
MONOCYTES # BLD AUTO: 0.8 K/UL (ref 0.3–1)
MONOCYTES NFR BLD: 8.6 % (ref 4–15)
NEUTROPHILS # BLD AUTO: 6.8 K/UL (ref 1.8–7.7)
NEUTROPHILS NFR BLD: 74.1 % (ref 38–73)
NRBC BLD-RTO: 0 /100 WBC
PLATELET # BLD AUTO: 92 K/UL (ref 150–350)
PMV BLD AUTO: 10.6 FL (ref 9.2–12.9)
POTASSIUM SERPL-SCNC: 3.3 MMOL/L (ref 3.5–5.1)
PROT SERPL-MCNC: 9.7 G/DL (ref 6–8.4)
RBC # BLD AUTO: 2.99 M/UL (ref 4.6–6.2)
SODIUM SERPL-SCNC: 136 MMOL/L (ref 136–145)
WBC # BLD AUTO: 9.21 K/UL (ref 3.9–12.7)

## 2019-08-18 PROCEDURE — 25500020 PHARM REV CODE 255: Performed by: EMERGENCY MEDICINE

## 2019-08-18 PROCEDURE — 85025 COMPLETE CBC W/AUTO DIFF WBC: CPT

## 2019-08-18 PROCEDURE — 96375 TX/PRO/DX INJ NEW DRUG ADDON: CPT

## 2019-08-18 PROCEDURE — 96374 THER/PROPH/DIAG INJ IV PUSH: CPT | Mod: 59

## 2019-08-18 PROCEDURE — 83690 ASSAY OF LIPASE: CPT

## 2019-08-18 PROCEDURE — 80053 COMPREHEN METABOLIC PANEL: CPT

## 2019-08-18 PROCEDURE — 99285 EMERGENCY DEPT VISIT HI MDM: CPT | Mod: 25

## 2019-08-18 PROCEDURE — 63600175 PHARM REV CODE 636 W HCPCS: Performed by: EMERGENCY MEDICINE

## 2019-08-18 RX ORDER — TRAZODONE HYDROCHLORIDE 50 MG/1
50 TABLET ORAL NIGHTLY
COMMUNITY
End: 2020-04-08 | Stop reason: CLARIF

## 2019-08-18 RX ORDER — ONDANSETRON 2 MG/ML
4 INJECTION INTRAMUSCULAR; INTRAVENOUS
Status: COMPLETED | OUTPATIENT
Start: 2019-08-18 | End: 2019-08-18

## 2019-08-18 RX ORDER — FENTANYL CITRATE 50 UG/ML
75 INJECTION, SOLUTION INTRAMUSCULAR; INTRAVENOUS
Status: COMPLETED | OUTPATIENT
Start: 2019-08-18 | End: 2019-08-18

## 2019-08-18 RX ORDER — KETOROLAC TROMETHAMINE 30 MG/ML
15 INJECTION, SOLUTION INTRAMUSCULAR; INTRAVENOUS
Status: DISCONTINUED | OUTPATIENT
Start: 2019-08-18 | End: 2019-08-18

## 2019-08-18 RX ADMIN — FENTANYL CITRATE 75 MCG: 50 INJECTION INTRAMUSCULAR; INTRAVENOUS at 10:08

## 2019-08-18 RX ADMIN — ONDANSETRON 4 MG: 2 INJECTION INTRAMUSCULAR; INTRAVENOUS at 10:08

## 2019-08-18 RX ADMIN — SODIUM CHLORIDE 1000 ML: 0.9 INJECTION, SOLUTION INTRAVENOUS at 10:08

## 2019-08-18 RX ADMIN — IOHEXOL 75 ML: 350 INJECTION, SOLUTION INTRAVENOUS at 11:08

## 2019-08-19 VITALS
HEART RATE: 90 BPM | SYSTOLIC BLOOD PRESSURE: 140 MMHG | DIASTOLIC BLOOD PRESSURE: 76 MMHG | TEMPERATURE: 99 F | RESPIRATION RATE: 18 BRPM | WEIGHT: 178 LBS | BODY MASS INDEX: 24.11 KG/M2 | OXYGEN SATURATION: 98 % | HEIGHT: 72 IN

## 2019-08-19 PROCEDURE — 25000003 PHARM REV CODE 250: Performed by: EMERGENCY MEDICINE

## 2019-08-19 RX ORDER — METRONIDAZOLE 500 MG/1
500 TABLET ORAL
Status: COMPLETED | OUTPATIENT
Start: 2019-08-19 | End: 2019-08-19

## 2019-08-19 RX ORDER — HYDROCODONE BITARTRATE AND ACETAMINOPHEN 5; 325 MG/1; MG/1
1 TABLET ORAL
Status: COMPLETED | OUTPATIENT
Start: 2019-08-19 | End: 2019-08-19

## 2019-08-19 RX ORDER — METRONIDAZOLE 500 MG/1
500 TABLET ORAL EVERY 12 HOURS
Qty: 20 TABLET | Refills: 0 | Status: SHIPPED | OUTPATIENT
Start: 2019-08-19 | End: 2019-08-29

## 2019-08-19 RX ORDER — TRAMADOL HYDROCHLORIDE 50 MG/1
50 TABLET ORAL EVERY 6 HOURS PRN
Qty: 15 TABLET | Refills: 0 | Status: SHIPPED | OUTPATIENT
Start: 2019-08-19 | End: 2019-08-29

## 2019-08-19 RX ORDER — ONDANSETRON 4 MG/1
4 TABLET, ORALLY DISINTEGRATING ORAL EVERY 6 HOURS PRN
Qty: 20 TABLET | Refills: 0 | Status: SHIPPED | OUTPATIENT
Start: 2019-08-19 | End: 2020-09-03

## 2019-08-19 RX ORDER — CIPROFLOXACIN 500 MG/1
500 TABLET ORAL 2 TIMES DAILY
Qty: 20 TABLET | Refills: 0 | Status: SHIPPED | OUTPATIENT
Start: 2019-08-19 | End: 2019-08-29

## 2019-08-19 RX ORDER — CIPROFLOXACIN 500 MG/1
500 TABLET ORAL
Status: COMPLETED | OUTPATIENT
Start: 2019-08-19 | End: 2019-08-19

## 2019-08-19 RX ADMIN — HYDROCODONE BITARTRATE AND ACETAMINOPHEN 1 TABLET: 5; 325 TABLET ORAL at 02:08

## 2019-08-19 RX ADMIN — CIPROFLOXACIN HYDROCHLORIDE 500 MG: 500 TABLET, FILM COATED ORAL at 02:08

## 2019-08-19 RX ADMIN — METRONIDAZOLE 500 MG: 500 TABLET, FILM COATED ORAL at 02:08

## 2019-08-19 NOTE — DISCHARGE INSTRUCTIONS
"Facts about and Definition of Colitis  Colitis is a term used to describe inflammation of the colon.   There are many causes of colitis, for example, infections (food poisoning from E. coli, Salmonella), poor blood supply, and autoimmune reactions.   Symptoms of colitis include   diarrhea that may have blood   frequent and small bowel movements,   abdominal pain and cramping   constipation  Individuals with colitis may have mild, moderate or severe colitis.   Types of colitis include microscopic colitis, C. diff colitis, infectious colitis, ischemic colitis, Crohns disease and ulcerative colitis (one type of inflammatory bowel disease), and chemical colitis.   The diagnosis of colitis is made by patient history, physical examination, laboratory tests, colonoscopy, and imaging tests.   Treatment for colitis depends on the specific type of colitis.  What Is Colitis?  Colitis is an inflammation of the colon, also known as the large intestine. While there are many causes of colitis including infections, poor blood supply (ischemia), and autoimmune reactions, they share common symptoms of abdominal pain and diarrhea.  What Are the Types of Colitis?  There are many types of colitis. The most common include:  Inflammatory bowel disease (IBD) colitis (Crohn's disease or ulcerative colitis)   Microscopic colitis   Chemical colitis   Ischemic colitis   Infectious colitis (food poisoning caused by infections, and infections caused by parasites or bacteria)   Medication caused colitis  Common Causes of Colitis  Inflammation of the colon can be caused by a variety of illnesses and infections. Some of the most common causes are discussed in the next few sections.  1. Infectious Colitis  Viruses and bacteria can cause colon infections. Most are food-borne illnesses or "food poisoning." Common bacterial causes of food borne infection include Shigella, E Coli, Salmonella and Campylobacter. These infections may cause bloody diarrhea " and can result in significant dehydration.   Parasite infections such as giardia also can cause significant diarrhea. The parasite can enter the body when infected water is swallowed. The source may be from recreational water such as rivers, lakes, and swimming pools. It also may be contaminated water from a well or cistern.   Pseudomembranous colitis is caused by the bacteria Clostridium difficile (C. difficile). This disorder is often seen in patients who have recently been taking antibiotics for an infection or have been admitted to the hospital. The antibiotic alters the normal bacteria present in the colon that helps with digestion and allows an overgrowth of the Clostridium bacteria. Clostridium bacteria produce a toxin that causes diarrhea. This is an infection, and often there is a fever present. The diarrhea is usually not bloody.  2. Ischemic Colitis  The arteries that supply blood to the colon are like any other artery in the body. They have the potential to become narrow due to atherosclerosis (just like blood vessels in the heart, which can cause angina, or narrowed vessels in the brain can cause a stroke). When these arteries become narrow, the colon may lose its blood supply and become inflamed.   The colon can also lose its blood supply for mechanical reasons. A couple of examples include volvulus, where the bowel twists on itself, or an incarcerated hernia, where a portion of the colon gets trapped in an outpouching of the abdominal wall, which prevents blood from flowing to the affected portion.   In individuals who are at risk for decreased blood flow to the colon, ischemic colitis can occur if the blood pressure falls. This may occur with dehydration, anemia, or shock.   Ischemia or lack of blood supply causes significant pain, fever, and bloody bowel movements.   Blood clots can also travel or embolize to block an artery and decrease blood flow to the bowel. Individuals who have the common heart  rhythm disturbance, atrial fibrillation, are at risk of forming small clots in the heart, which break off and block the blood supply to the bowel. This is the same mechanism that can cause a stroke or TIA (transient ischemic attack) if the blockage occurs in an artery that supplies the brain.  When to Seek Medical Care for Colitis  Diarrhea is a common symptom of colitis, and most episodes resolve in a matter of hours.  Medical care should be accessed if any of the following conditions exist:  persistent diarrhea,   dehydration (symptoms of dehydration include lightheadedness; weakness; decreased urination; dry mouth, eyes, and skin.   fever,   significant abdominal pain, and/or   blood in the bowel movement.  What Is the Treatment for Colitis?  The definitive treatment of colitis depends upon the cause. Many cases require little more than symptomatic care, including clear fluids to rest the bowel and medications to control pain. Some patients become acutely ill and will need intravenous (IV) fluids and other interventions to treat their illness.  Infections: Depending on the cause, infections that cause diarrhea and colitis may or may not require antibiotics. Viral infections resolve with the supportive care of fluids and time. Some bacterial infections like Salmonella also do not need antibiotic therapy; the body is able to get rid of the infection on its own. However, other bacterial infections like Clostridium difficile always require treatment with antibiotics.   Ischemic colitis: Treatment of ischemic colitis is initially supportive, using intravenous fluids to rest the bowel and prevent dehydration. If adequate blood supply to the bowel isn't restored, surgery may be required to remove parts of the bowel that have lost blood supply and become necrotic (tissue that has ).   Inflammatory bowel disease (IBD): Inflammatory bowel diseases (IBDs) like ulcerative colitis and Crohn's disease, are often controlled  by a combination of medications that are used in a step-wise approach. Initially, anti-inflammatory medications are used, and if these are less than successful, medications that suppress the immune system can be added. In the most severe cases, surgery may be required to remove all or parts of the colon and small intestine.   Diarrhea and abdominal pain: Most causes of colitis present with diarrhea and crampy abdominal pain. These symptoms are also found with mild illnesses like viral enterocolitis (inflammation of the small intestine and colon). Initial treatment at home may include a clear fluid diet for 24 hours, rest, and acetaminophen (Tylenol) or the NSAID ibuprofen (Advil, Motrin, etc.) as needed for pain. Often symptoms resolve quickly and no further care is needed. Loperamide (Imodium) is an effective medicine to control diarrhea if there is no blood or fever present.  Is There a Colitis Diet?  A clear fluid diet may be the best way to treat the diarrhea associated with colitis. Clear fluids are absorbed in the stomach and no waste products are delivered to the colon, allowing it to rest. Clear fluids without carbonation (bubbles) include anything that one can see through, and also includes popsicles and Jell-O.   Depending up on the cause of colitis, there may be some foods that can be tolerated and others that make the symptoms worse or produce flares. Keep a food diary to help identify and eliminate trigger foods, and identify and eat more foods that soothe or calm the colon.   Individuals with certain food intolerance may need to avoid whole groups of foods. Those with lactose intolerance should not eat foods containing dairy products including milk, cheese, yogurt and ice cream. Those with celiac disease need to avoid gluten containing foods.   Individuals with inflammatory bowel disease (ulcerative colitis and Crohn's Disease) may want to limit exposure to fatty, greasy and fried foods, high fiber  foods (seeds, nuts, corn), and dairy products.  Hydration  Hydration: Adequate hydration is important because an individual can lose a significant amount of fluid with each diarrheal bowel movement. Aside from the daily fluid requirements, this excess loss needs to be replaced, otherwise dehydration will occur and potentially worsen the symptoms of abdominal pain and cramping.   IV fluids: Intravenous (IV) fluid may be required, especially if the patient cannot drink enough fluids by mouth. For some illnesses like ischemic colitis, in which blood flow to the bowel is already compromised, adequate hydration is a key element in treatment. Electrolyte replacement may be required in some patients who have significant dehydration.

## 2019-08-19 NOTE — ED TRIAGE NOTES
"Pt reports to ED via personal transport with c/o of L sided abdominal pain x1week.  Pt reports the pain is sharp and cramping and radiates to his back.  Pt reports eating causes the pain to intensify.  Pt reports N/V/D.  Pt reports PMH of diverticulitis, colon resection, GI bleed, and pulmonary embolism.  Pt denies blood in stool.  Pt states "last time I felt like this it was my diverticulitis."  Pt denies fever, CP, and SOB.  Pt lying in stretcher, respirations even and unlabored, eyes open spontaneously.  NAD noted, AAOx4, answering questions appropriately.  Pt placed on BP cycling and pulse ox monitoring q30min.  Bed locked and in lowest position, SRx2 up, call light within reach.  Sister at bedside. Will continue to monitor.    Patient identifiers, allergies, and medications for Irvin Diaz checked and correct   LOC: Patient is awake, alert, and aware of environment with an appropriate affect.  Patient is oriented x4 and speaking appropriately.  APPEARANCE: Patient resting comfortably and in no acute distress.  Patient is clean and well groomed, patient's clothing is properly fastened.  SKIN: The skin is warm and dry.  Patient has normal skin turgor and moist mucus membranes.  Skin is intact: no bruising or breakdown noted.  MUSCULOSKELETAL: Patient is moving all extremities well, no obvious swelling or deformities noted. Pulses intact.  RESPIRATORY: Airway is open and patent.  Respirations are spontaneous, even and unlabored.  Normal effort and rate noted.  CARDIAC: Patient has a normal rate and rhythm.  No peripheral edema noted.  Capillary refill < 3 seconds.  ABDOMEN: No abd distention noted.  Bowel sounds active in all 4 quadrants.  Soft and non-tender upon palpation.  See HPI  NEUROLOGICAL: PERRLA.  Facial expression is symmetrical.  Hand grasps are equal bilaterally.  Normal sensation in all extremities when touched with finger.  Following commands appropriately.  PAIN: Pt reports 9    "

## 2019-08-19 NOTE — ED PROVIDER NOTES
Encounter Date: 8/18/2019    SCRIBE #1 NOTE: I, Latanya Valderrama, am scribing for, and in the presence of, Dr. Avitia.       History     Chief Complaint   Patient presents with    Abdominal Pain     Pt CO LLQ abd pain with N/V/D Xs 1 week.      Time seen by provider: 10:39 PM    This is a 43 y.o. male with hx of HTN, ENA, cirrhosis, and PE who presents with complaint of abdominal pain for one week. The sharp and cramping pain radiates to his back and worsens when eating. He reports N/V/D. He denies blood in stool or rectal bleeding. He experienced similar episode with diverticulitis. He reports hx GI bleed and PSHx colon resection. He denies FHx of PE or DVT. He states he no longer drinks alcohol.    The history is provided by the patient and medical records.     Review of patient's allergies indicates:   Allergen Reactions    Morphine Itching     Past Medical History:   Diagnosis Date    Alcoholic cirrhosis of liver     Arthritis     Hypertension     Pulmonary embolism      Past Surgical History:   Procedure Laterality Date    COLON SURGERY      EGD (ESOPHAGOGASTRODUODENOSCOPY) N/A 7/26/2019    Performed by Brian Trivedi MD at [facility]    HERNIA REPAIR       History reviewed. No pertinent family history.  Social History     Tobacco Use    Smoking status: Never Smoker   Substance Use Topics    Alcohol use: Not Currently     Comment: freq    Drug use: Yes     Types: Marijuana     Review of Systems   Constitutional: Negative for fever.   HENT: Negative for sore throat.    Respiratory: Negative for shortness of breath.    Cardiovascular: Negative for chest pain.   Gastrointestinal: Positive for abdominal pain, diarrhea, nausea and vomiting. Negative for abdominal distention, anal bleeding, blood in stool and constipation.   Genitourinary: Negative for dysuria.   Musculoskeletal: Negative for back pain.   Skin: Negative for rash.   Neurological: Negative for weakness.   Hematological: Does not  bruise/bleed easily.   All other systems reviewed and are negative.      Physical Exam     Initial Vitals [08/18/19 2047]   BP Pulse Resp Temp SpO2   (!) 153/82 96 18 98.2 °F (36.8 °C) 100 %      MAP       --         Physical Exam    Nursing note and vitals reviewed.  Constitutional: He appears well-developed and well-nourished. He is not diaphoretic. No distress.   HENT:   Head: Normocephalic and atraumatic.   Right Ear: External ear normal.   Left Ear: External ear normal.   Nose: Nose normal.   Mouth/Throat: Oropharynx is clear and moist.   Eyes: Conjunctivae and EOM are normal. Pupils are equal, round, and reactive to light.   Neck: Normal range of motion. Neck supple. No tracheal deviation present. No JVD present.   Cardiovascular: Normal rate, regular rhythm, normal heart sounds and intact distal pulses. Exam reveals no gallop and no friction rub.    No murmur heard.  Pulmonary/Chest: Breath sounds normal. No respiratory distress. He has no wheezes. He has no rhonchi. He has no rales. He exhibits no tenderness.   Abdominal: Soft. Bowel sounds are normal. He exhibits no distension and no mass. There is tenderness (left side). There is no rebound and no guarding.   Musculoskeletal: Normal range of motion. He exhibits no edema or tenderness.   Neurological: He is alert and oriented to person, place, and time. He has normal strength. He displays normal reflexes. No cranial nerve deficit or sensory deficit.   Skin: Skin is warm and dry. Capillary refill takes less than 2 seconds. No rash noted. No erythema.   Psychiatric: He has a normal mood and affect. Thought content normal.         ED Course   Procedures  Labs Reviewed   CBC W/ AUTO DIFFERENTIAL - Abnormal; Notable for the following components:       Result Value    RBC 2.99 (*)     Hemoglobin 11.0 (*)     Hematocrit 32.2 (*)     Mean Corpuscular Volume 108 (*)     Mean Corpuscular Hemoglobin 36.8 (*)     Platelets 92 (*)     Gran% 74.1 (*)     Lymph% 15.9 (*)      All other components within normal limits   COMPREHENSIVE METABOLIC PANEL - Abnormal; Notable for the following components:    Potassium 3.3 (*)     Chloride 94 (*)     Glucose 111 (*)     Total Protein 9.7 (*)     Total Bilirubin 4.5 (*)      (*)     ALT 50 (*)     Anion Gap 18 (*)     All other components within normal limits   LIPASE - Abnormal; Notable for the following components:    Lipase 484 (*)     All other components within normal limits          Imaging Results           CT Abdomen Pelvis With Contrast (Final result)  Result time 08/19/19 00:22:28    Final result by Matteo Maguire MD (08/19/19 00:22:28)                 Impression:      Inflammatory appearance throughout the colon, although more prominent involving the descending colon as above, clinical and historical correlation for colitis is needed.  The possibility of a general colitis with a superimposed diverticulitis is a consideration although focal inflamed diverticulum is not seen, as discussed above.    Findings for which clinical and historical correlation for chronic hepatic disease and portal venous hypertension is needed.    Vague area of abnormality of the right lobe of the liver appears similar to the prior study raises concern for a small mass lesion, if not previously evaluated follow-up MRI is recommended as previously discussed.    Mild perinephric stranding without obstructive uropathy may relate to a baseline or may relate to a general systemic process to include mild general edema however given the history of fever correlation for UTI/pyelonephritis is needed.    Abnormal appearance of the left femoral head likely relates to changes of avascular necrosis.    This report was flagged in Epic as abnormal.      Electronically signed by: Matteo Maguire  Date:    08/19/2019  Time:    00:22             Narrative:    EXAMINATION:  CT ABDOMEN PELVIS WITH CONTRAST    CLINICAL HISTORY:  Abd pain, fever, abscess  suspected;    TECHNIQUE:  Low dose axial images, sagittal and coronal reformations were obtained from the lung bases to the pubic symphysis following the IV administration of 75 mL of Omnipaque 350 .  Oral contrast was not given.    COMPARISON:  July 25, 2019    FINDINGS:  Single-phase CT examination of the abdomen and pelvis was performed.  The lung bases demonstrate mild motion artifact and atelectatic change.  The stomach demonstrates incomplete distention nonspecific appearance of mild fluid and air and ingested material.    The liver demonstrates mild irregular contour, appears prominent, there are multiple vascular structures likely representing varices noted, there is mild prominence of the spleen, there is increased attenuation of the mesenteric fat planes that may relate to a general pattern of edema although significant free fluid of ascites not appreciated.  These findings may relate to chronic hepatic disease and portal venous hypertension, clinical and historical correlation is needed.  As seen on axial image 52 there is the subtle suggestion of a lesion of the right lobe of the liver adjacent to the gallbladder fossa measuring approximately 2.2 cm in size.  This appears similar to the prior study, as discussed on the prior examination MRI examination may be helpful to exclude the possibility of a solid lesion.  The liver also demonstrates appearance of mild diminished attenuation which may relate to diffuse fatty infiltrate.    There is no evidence for acute process of the gallbladder, pancreas, spleen or adrenal glands.  There is no evidence for hydronephrosis or obstructive uropathy bilaterally there is mild perinephric haziness, this appears symmetric and may relate to a systemic process, although correlation for UTI/pyelonephritis is recommended given the history of fever.    The abdominal aorta demonstrates appropriate opacification, small retroperitoneal lymph nodes are noted, nonspecific,  stable.  The urinary bladder appears unremarkable for degree of distention, the prostate appears prominent.  There are multiple small groin/inguinal lymph nodes with a prominent lymph node seen on the left.    There is no evidence for small bowel obstructive process.  Postoperative change of the colon with partial colectomy, right colectomy is noted.  There is appearance of mild wall and fold thickening throughout the course of the colon, correlation for colitis is needed, this is more prominent along the descending colon, multiple diverticuli are noted and the possibility of superimposed acute diverticulitis is a consideration although a focal area of inflamed diverticulum is not seen in this would not explain the general pattern of colitis throughout the course of the colon.  Clinical and historical correlation is otherwise needed.  There is no obstruction of the colon there is no free intraperitoneal air there is no evidence for abscess collection.  The osseous structures demonstrate chronic change there is extensive abnormal appearance of the left hip joint, involving the left femoral head likely relating to changes of avascular necrosis.                                 Medical Decision Making:   History:   Old Medical Records: I decided to obtain old medical records.  Differential Diagnosis:   Acute pyelonephritis, ureterolithiais, AAA rupture / dissection, GERD, intestinal spasm, GERD, intestinal spasm, gastroenteritis, gastritis, ulcer, cholecystitis, gallstones, pancreatitis, ileus, small bowel obstruction, appendicitis, constipation, intestinal gas pain, gastritis, gastroenteritis, pancreatitis, testicular torsion, epididymitis, intrabominal hemorrhage, intrabominal thrombus    Clinical Tests:   Lab Tests: Ordered and Reviewed  Radiological Study: Ordered and Reviewed            Scribe Attestation:   Scribe #1: I performed the above scribed service and the documentation accurately describes the services I  performed. I attest to the accuracy of the note.    Attending Attestation:           Physician Attestation for Scribe:  Physician Attestation Statement for Scribe #1: I, Dr. Avitia, reviewed documentation, as scribed by Latanya Valderrama in my presence, and it is both accurate and complete.                 ED Course as of Aug 19 0443   Mon Aug 19, 2019   0042 Discussed with patient and significant other the findings of the CT scan which include colitis and a liver lesion.  Emphasized to them that the liver lesion needs to be imaged and given his history likely he needs a biopsy.  Patient has a GI doctor and instructed them to discuss this with the GI doctor.  Patient and significant other report the patient has not been able to keep any p.o. down.  They are not aware of any past pancreatitis issues.  Patient again reports that he is not drinking alcohol and he has been able to abstain.  Will attempt p.o. challenge    [MA]   0148 Patient was able to tolerate p.o..  Discussed with patient and spouse dietary changes.  Educated them on warning signs and symptoms for which they must  seek immediate medical attention.    [MA]      ED Course User Index  [MA] Edy Avitia MD     Clinical Impression:     1. Acute pancreatitis, unspecified complication status, unspecified pancreatitis type    2. Colitis    3. Liver lesion                                 Edy Avitia MD  08/19/19 0275

## 2019-09-19 ENCOUNTER — TELEPHONE (OUTPATIENT)
Dept: NEUROLOGY | Facility: HOSPITAL | Age: 44
End: 2019-09-19

## 2019-09-19 NOTE — TELEPHONE ENCOUNTER
----- Message from Steve Chairez MD sent at 8/21/2019  8:49 AM CDT -----  Regarding: RE: Make a appointment  Contact: [phone]  Yes. Please refer to [facility]     ----- Message -----  From: Mary Ayon LPN  Sent: 8/20/2019  10:01 AM  To: Steve Chairez MD  Subject: FW: Make a appointment                           New medicaid pt, do want to see or refer to [facility]?  ----- Message -----  From: Maggie Sharp RN  Sent: 8/19/2019   4:27 PM  To: Mary Ayon LPN  Subject: FW: Make a appointment                           This was in the staff messages. Can I help? Do you want all of [facility] messages directly?  ----- Message -----  From: Dorene Otto  Sent: 8/19/2019  10:33 AM  To: [facility] Clinical Support  Subject: Make a appointment                               Patient is calling to schedule a appointment with Dr. Chairez after going to the emergency room. Please call

## 2019-09-23 ENCOUNTER — HOSPITAL ENCOUNTER (EMERGENCY)
Facility: OTHER | Age: 44
Discharge: HOME | End: 2019-09-23
Attending: EMERGENCY MEDICINE
Payer: MEDICAID

## 2019-09-23 ENCOUNTER — TELEPHONE (OUTPATIENT)
Dept: UROLOGY | Facility: CLINIC | Age: 44
End: 2019-09-23

## 2019-09-23 VITALS
TEMPERATURE: 99 F | SYSTOLIC BLOOD PRESSURE: 174 MMHG | HEART RATE: 79 BPM | RESPIRATION RATE: 16 BRPM | WEIGHT: 190 LBS | DIASTOLIC BLOOD PRESSURE: 91 MMHG | HEIGHT: 72 IN | BODY MASS INDEX: 25.73 KG/M2 | OXYGEN SATURATION: 98 %

## 2019-09-23 DIAGNOSIS — R52 PAIN: ICD-10-CM

## 2019-09-23 DIAGNOSIS — N41.0 ACUTE PROSTATITIS: Primary | ICD-10-CM

## 2019-09-23 LAB
ALBUMIN SERPL BCP-MCNC: 3.4 G/DL (ref 3.5–5.2)
ALP SERPL-CCNC: 88 U/L (ref 55–135)
ALT SERPL W/O P-5'-P-CCNC: 35 U/L (ref 10–44)
ANION GAP SERPL CALC-SCNC: 11 MMOL/L (ref 8–16)
APTT BLDCRRT: 32.6 SEC (ref 21–32)
AST SERPL-CCNC: 78 U/L (ref 10–40)
BACTERIA #/AREA URNS HPF: ABNORMAL /HPF
BASOPHILS # BLD AUTO: 0.03 K/UL (ref 0–0.2)
BASOPHILS NFR BLD: 0.5 % (ref 0–1.9)
BILIRUB SERPL-MCNC: 4.9 MG/DL (ref 0.1–1)
BILIRUB UR QL STRIP: ABNORMAL
BUN SERPL-MCNC: 12 MG/DL (ref 6–20)
CALCIUM SERPL-MCNC: 9.5 MG/DL (ref 8.7–10.5)
CHLORIDE SERPL-SCNC: 99 MMOL/L (ref 95–110)
CLARITY UR: CLEAR
CO2 SERPL-SCNC: 27 MMOL/L (ref 23–29)
COLOR UR: YELLOW
CREAT SERPL-MCNC: 1 MG/DL (ref 0.5–1.4)
DIFFERENTIAL METHOD: ABNORMAL
EOSINOPHIL # BLD AUTO: 0 K/UL (ref 0–0.5)
EOSINOPHIL NFR BLD: 0.6 % (ref 0–8)
ERYTHROCYTE [DISTWIDTH] IN BLOOD BY AUTOMATED COUNT: 13.2 % (ref 11.5–14.5)
EST. GFR  (AFRICAN AMERICAN): >60 ML/MIN/1.73 M^2
EST. GFR  (NON AFRICAN AMERICAN): >60 ML/MIN/1.73 M^2
GLUCOSE SERPL-MCNC: 96 MG/DL (ref 70–110)
GLUCOSE UR QL STRIP: NEGATIVE
HCT VFR BLD AUTO: 32 % (ref 40–54)
HGB BLD-MCNC: 10.7 G/DL (ref 14–18)
HGB UR QL STRIP: ABNORMAL
HYALINE CASTS #/AREA URNS LPF: 2 /LPF
IMM GRANULOCYTES # BLD AUTO: 0.02 K/UL (ref 0–0.04)
IMM GRANULOCYTES NFR BLD AUTO: 0.3 % (ref 0–0.5)
INR PPP: 1.2 (ref 0.8–1.2)
KETONES UR QL STRIP: ABNORMAL
LEUKOCYTE ESTERASE UR QL STRIP: ABNORMAL
LYMPHOCYTES # BLD AUTO: 1.4 K/UL (ref 1–4.8)
LYMPHOCYTES NFR BLD: 22 % (ref 18–48)
MCH RBC QN AUTO: 35 PG (ref 27–31)
MCHC RBC AUTO-ENTMCNC: 33.4 G/DL (ref 32–36)
MCV RBC AUTO: 105 FL (ref 82–98)
MICROSCOPIC COMMENT: ABNORMAL
MONOCYTES # BLD AUTO: 0.6 K/UL (ref 0.3–1)
MONOCYTES NFR BLD: 8.8 % (ref 4–15)
NEUTROPHILS # BLD AUTO: 4.3 K/UL (ref 1.8–7.7)
NEUTROPHILS NFR BLD: 67.8 % (ref 38–73)
NITRITE UR QL STRIP: POSITIVE
NRBC BLD-RTO: 0 /100 WBC
PH UR STRIP: 7 [PH] (ref 5–8)
PLATELET # BLD AUTO: 92 K/UL (ref 150–350)
PMV BLD AUTO: 11 FL (ref 9.2–12.9)
POTASSIUM SERPL-SCNC: 3.3 MMOL/L (ref 3.5–5.1)
PROT SERPL-MCNC: 8.1 G/DL (ref 6–8.4)
PROT UR QL STRIP: ABNORMAL
PROTHROMBIN TIME: 13.8 SEC (ref 9–12.5)
RBC # BLD AUTO: 3.06 M/UL (ref 4.6–6.2)
RBC #/AREA URNS HPF: 5 /HPF (ref 0–4)
SODIUM SERPL-SCNC: 137 MMOL/L (ref 136–145)
SP GR UR STRIP: 1.01 (ref 1–1.03)
SQUAMOUS #/AREA URNS HPF: 2 /HPF
URN SPEC COLLECT METH UR: ABNORMAL
UROBILINOGEN UR STRIP-ACNC: 1 EU/DL
WBC # BLD AUTO: 6.36 K/UL (ref 3.9–12.7)
WBC #/AREA URNS HPF: 10 /HPF (ref 0–5)

## 2019-09-23 PROCEDURE — 85610 PROTHROMBIN TIME: CPT

## 2019-09-23 PROCEDURE — 85730 THROMBOPLASTIN TIME PARTIAL: CPT

## 2019-09-23 PROCEDURE — 80053 COMPREHEN METABOLIC PANEL: CPT

## 2019-09-23 PROCEDURE — 99284 EMERGENCY DEPT VISIT MOD MDM: CPT | Mod: 25

## 2019-09-23 PROCEDURE — 85025 COMPLETE CBC W/AUTO DIFF WBC: CPT

## 2019-09-23 PROCEDURE — 81000 URINALYSIS NONAUTO W/SCOPE: CPT

## 2019-09-23 PROCEDURE — 36415 COLL VENOUS BLD VENIPUNCTURE: CPT

## 2019-09-23 RX ORDER — CIPROFLOXACIN 500 MG/1
500 TABLET ORAL 2 TIMES DAILY
Qty: 20 TABLET | Refills: 0 | Status: SHIPPED | OUTPATIENT
Start: 2019-09-23 | End: 2019-10-03

## 2019-09-23 NOTE — ED TRIAGE NOTES
Pt reports to ED c/o lower abd pain and left testicular pain x 3 days worse with movement and coughing. Pt also endorses infrequent episodes of  vomiting, hematuria, and dysuria over past 3 days. Pt denies blood clots in urine, urinary urgency or frequency. Pt also reports fever of 102 yesterday. PMH of cirrhosis, esophageal varices and HTN. 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 Denies other drug use. Pt also reports right flank pain with tenderness to palpation. Abd is distended.     Patient identifiers for Irvin Diaz checked and correct.  LOC: Patient is awake, alert, and aware of environment with an appropriate affect. Patient is oriented x 3 and speaking appropriately.  APPEARANCE: Patient resting comfortably and in no acute distress. Patient is clean and well groomed, patient's clothing is properly fastened.  NEUROLOGICAL: .Normal sensation in all extremities when touched with finger. Following commands appropriately.   Allergies reported:   Review of patient's allergies indicates:   Allergen Reactions    Morphine Itching

## 2019-09-23 NOTE — ED PROVIDER NOTES
Encounter Date: 9/23/2019    SCRIBE #1 NOTE: Connie ISBELL, am scribing for, and in the presence of, Dr. Avitia.       History     Chief Complaint   Patient presents with    Hematuria     since Saturday. pt reports right back pain and testicle pain     Time seen by provider: 8:48 AM    This is a 44 y.o. male, with a hx of alcoholic cirrhosis of the liver, who presents with complaint of testicular pain for the past 2 days. Pt states upon ejaculation, the pain began and he noticed blood in his semen. Pt reports mild abdominal pain, back pain, and one episode of vomiting. He denies bleeding from his gums, easy bruising, blood in stool, hematuria or dysuria. He states is not on blood thinners. [facility] manages his cirrhosis.  Patient reports that his cirrhosis secondary to alcohol use, but he states he has not had any alcohol in greater than 2 years    The history is provided by the patient and medical records.     Review of patient's allergies indicates:   Allergen Reactions    Morphine Itching     Past Medical History:   Diagnosis Date    Alcoholic cirrhosis of liver     Arthritis     Hypertension     Pulmonary embolism      Past Surgical History:   Procedure Laterality Date    COLON SURGERY      ESOPHAGOGASTRODUODENOSCOPY N/A 7/26/2019    Procedure: EGD (ESOPHAGOGASTRODUODENOSCOPY);  Surgeon: Brian Trivedi MD;  Location: [facility];  Service: Endoscopy;  Laterality: N/A;    HERNIA REPAIR       No family history on file.  Social History     Tobacco Use    Smoking status: Never Smoker   Substance Use Topics    Alcohol use: Not Currently     Comment: freq    Drug use: Yes     Types: Marijuana     Review of Systems   Constitutional: Negative for chills and fever.   HENT: Negative for congestion, rhinorrhea and sore throat.    Eyes: Negative for visual disturbance.   Respiratory: Negative for cough and shortness of breath.    Cardiovascular: Negative for chest pain.   Gastrointestinal: Positive for  abdominal pain and vomiting. Negative for blood in stool, diarrhea and nausea.   Genitourinary: Positive for testicular pain. Negative for dysuria and hematuria.   Musculoskeletal: Positive for back pain.   Skin: Negative for rash.   Neurological: Negative for dizziness, weakness and light-headedness.   Hematological: Does not bruise/bleed easily.   Psychiatric/Behavioral: Negative for confusion.   All other systems reviewed and are negative.      Physical Exam     Initial Vitals [09/23/19 0745]   BP Pulse Resp Temp SpO2   (!) 183/93 73 18 98.4 °F (36.9 °C) 100 %      MAP       --         Physical Exam    Nursing note and vitals reviewed.  Constitutional: He appears well-developed and well-nourished. He is not diaphoretic. No distress.   HENT:   Head: Normocephalic and atraumatic.   Right Ear: External ear normal.   Left Ear: External ear normal.   Nose: Nose normal.   Eyes: Conjunctivae and EOM are normal. Pupils are equal, round, and reactive to light. No scleral icterus.   Neck: Normal range of motion. Neck supple. No tracheal deviation present. No JVD present.   Cardiovascular: Normal rate, regular rhythm, normal heart sounds and intact distal pulses. Exam reveals no gallop and no friction rub.    No murmur heard.  Pulmonary/Chest: Breath sounds normal. No respiratory distress. He has no wheezes. He has no rhonchi. He has no rales. He exhibits no tenderness.   Abdominal: Soft. Bowel sounds are normal. He exhibits no distension and no mass. There is no rebound and no guarding.   Slight TTP mid epigastric region.   Genitourinary:   Genitourinary Comments: No testicular edema. Slight TTP anterior left testicle.    Musculoskeletal: Normal range of motion. He exhibits no edema or tenderness.   Neurological: He is alert and oriented to person, place, and time. He has normal strength. He displays normal reflexes. No cranial nerve deficit or sensory deficit.   Skin: Skin is warm and dry. Capillary refill takes less than  2 seconds. No rash noted. No erythema.   Psychiatric: He has a normal mood and affect. Thought content normal.         ED Course   Procedures  Labs Reviewed   URINALYSIS, REFLEX TO URINE CULTURE - Abnormal; Notable for the following components:       Result Value    Protein, UA Trace (*)     Ketones, UA 1+ (*)     Bilirubin (UA) 2+ (*)     Occult Blood UA 1+ (*)     Nitrite, UA Positive (*)     Leukocytes, UA 1+ (*)     All other components within normal limits    Narrative:     Preferred Collection Type->Urine, Clean Catch   CBC W/ AUTO DIFFERENTIAL - Abnormal; Notable for the following components:    RBC 3.06 (*)     Hemoglobin 10.7 (*)     Hematocrit 32.0 (*)     Mean Corpuscular Volume 105 (*)     Mean Corpuscular Hemoglobin 35.0 (*)     Platelets 92 (*)     All other components within normal limits   COMPREHENSIVE METABOLIC PANEL - Abnormal; Notable for the following components:    Potassium 3.3 (*)     Albumin 3.4 (*)     Total Bilirubin 4.9 (*)     AST 78 (*)     All other components within normal limits   PROTIME-INR - Abnormal; Notable for the following components:    Prothrombin Time 13.8 (*)     All other components within normal limits   APTT - Abnormal; Notable for the following components:    aPTT 32.6 (*)     All other components within normal limits   URINALYSIS MICROSCOPIC - Abnormal; Notable for the following components:    RBC, UA 5 (*)     WBC, UA 10 (*)     Hyaline Casts, UA 2 (*)     All other components within normal limits    Narrative:     Preferred Collection Type->Urine, Clean Catch          Imaging Results          US Scrotum And Testicles (Final result)  Result time 09/23/19 09:15:26    Final result by Torrey Carey DO (09/23/19 09:15:26)                 Impression:      Bilateral testicular microlithiasis.    Otherwise testicles are normal size and echogenicity of the testicles bilaterally without evidence for mass lesion or lack of vascular waveform to suggest testicular  torsion.      Electronically signed by: Torrey DO Aurelio  Date:    09/23/2019  Time:    09:15             Narrative:    EXAMINATION:  US SCROTUM AND TESTICLES    CLINICAL HISTORY:  Pain, unspecified    TECHNIQUE:  Transverse and longitudinal scrotal imaging of the testicles with grayscale, color flow and Doppler imaging.    COMPARISON:  None.    FINDINGS:  Right testicle: The right testicle measures 2.3 by2.5 x 3.7 cm    There is normal arterial and venous waveform without varicosity or hydrocele.  There is normal color flow within the right epididymis.    The left testicle measures 2.3x 2.2 x 4.4 cm.  There is normal arterial and venous waveforms in the left testicle.  No evidence for left testicular varicocele or scrotal hydrocele..    1-2 punctate foci of echogenicity within the testicles bilaterally compatible with microlithiasis                                 Medical Decision Making:   History:   Old Medical Records: I decided to obtain old medical records.  Old Records Summarized: records from clinic visits.  Differential Diagnosis:   Acute pyelonephritis, ureterolithiais, AAA rupture / dissection, prostatitis, testicular torsion, epididymitis,  tumor, coagulopathy     Clinical Tests:   Lab Tests: Ordered and Reviewed  Radiological Study: Ordered and Reviewed  ED Management:  Patient's H/H is essentially unchanged, platelets are normal, LFTs have significantly improved.  His urine is significant for positive nitrites, positive leukocytes, 10 white blood cells and positive blood which would be consistent with a UTI or prostatitis.  I do not believe the patient is having an active coagulopathy as he has no other signs of platelet dysfunction and his platelets are unchanged.  Although  tumor still in the possibility, with an abrupt change we will treat 1st with antibiotics.  There is no signs and symptoms for acute pyelonephritis is he does not have any back pain no tenderness palpation, fevers or  tachycardia.  His exam is not consistent with a AAA rupture or ureterolithiasis.  Patient does have midepigastric tenderness palpation and some abdominal pain. We discussed the possibility of gastritis, reflux disease or peptic ulcer disease.  Patient with an otherwise benign abdominal exam.  Discussed dietary changes the patient.  Emphasized the patient the need to follow up with his GI doctors at [facility] for further evaluation.  Educated the patient on warning signs and symptoms which she must seek immediate medical attention            Scribe Attestation:   Scribe #1: I performed the above scribed service and the documentation accurately describes the services I performed. I attest to the accuracy of the note.    Attending Attestation:           Physician Attestation for Scribe:  Physician Attestation Statement for Scribe #1: I, Dr. Avitia, reviewed documentation, as scribed by Connie Álvarez in my presence, and it is both accurate and complete.                 ED Course as of Sep 23 0953   Mon Sep 23, 2019   0944 Discussed case with Dr. Mast, urology.  Given patient's symptoms, likely prostatitis.  Give 2 weeks of antibiotics and he will follow up with him then.   [DM]      ED Course User Index  [DM] Joe Álvarez     Clinical Impression:     1. Acute prostatitis    2. Pain                                   Edy Avitia MD  09/25/19 0630

## 2019-09-23 NOTE — TELEPHONE ENCOUNTER
----- Message from Fran Mast MD sent at 9/23/2019  9:46 AM CDT -----  Please make appt to see NP 2 WEEKS for prostatits    thanks

## 2019-10-16 ENCOUNTER — TELEPHONE (OUTPATIENT)
Dept: UROLOGY | Facility: CLINIC | Age: 44
End: 2019-10-16

## 2019-10-21 ENCOUNTER — TELEPHONE (OUTPATIENT)
Dept: UROLOGY | Facility: CLINIC | Age: 44
End: 2019-10-21

## 2019-10-28 ENCOUNTER — TELEPHONE (OUTPATIENT)
Dept: GASTROENTEROLOGY | Facility: CLINIC | Age: 44
End: 2019-10-28

## 2019-10-28 NOTE — TELEPHONE ENCOUNTER
----- Message from Nadira Kang MA sent at 8/19/2019 10:55 AM CDT -----  Pt had an appt with Dr. Blevins but cancelled. He would like to reschedule it. The appt was for a spot on his liver. He would like to verify that the doctor sees patients for this. Please advise.    [phone]

## 2020-01-08 ENCOUNTER — HOSPITAL ENCOUNTER (INPATIENT)
Facility: OTHER | Age: 45
LOS: 5 days | Discharge: HOME | DRG: 377 | End: 2020-01-13
Attending: EMERGENCY MEDICINE | Admitting: INTERNAL MEDICINE
Payer: MEDICAID

## 2020-01-08 DIAGNOSIS — K62.5 RECTAL BLEEDING: Primary | ICD-10-CM

## 2020-01-08 DIAGNOSIS — N17.9 AKI (ACUTE KIDNEY INJURY): ICD-10-CM

## 2020-01-08 DIAGNOSIS — I10 ESSENTIAL HYPERTENSION: ICD-10-CM

## 2020-01-08 DIAGNOSIS — K57.92 DIVERTICULITIS: ICD-10-CM

## 2020-01-08 DIAGNOSIS — D69.6 THROMBOCYTOPENIA: ICD-10-CM

## 2020-01-08 DIAGNOSIS — N17.0 ACUTE RENAL FAILURE WITH TUBULAR NECROSIS: ICD-10-CM

## 2020-01-08 LAB
BASOPHILS # BLD AUTO: 0.03 K/UL (ref 0–0.2)
BASOPHILS NFR BLD: 0.2 % (ref 0–1.9)
DIFFERENTIAL METHOD: ABNORMAL
EOSINOPHIL # BLD AUTO: 0.1 K/UL (ref 0–0.5)
EOSINOPHIL NFR BLD: 0.6 % (ref 0–8)
ERYTHROCYTE [DISTWIDTH] IN BLOOD BY AUTOMATED COUNT: 17.1 % (ref 11.5–14.5)
HCT VFR BLD AUTO: 35.3 % (ref 40–54)
HGB BLD-MCNC: 11.4 G/DL (ref 14–18)
IMM GRANULOCYTES # BLD AUTO: 0.08 K/UL (ref 0–0.04)
IMM GRANULOCYTES NFR BLD AUTO: 0.5 % (ref 0–0.5)
LYMPHOCYTES # BLD AUTO: 1 K/UL (ref 1–4.8)
LYMPHOCYTES NFR BLD: 5.8 % (ref 18–48)
MCH RBC QN AUTO: 36.3 PG (ref 27–31)
MCHC RBC AUTO-ENTMCNC: 32.3 G/DL (ref 32–36)
MCV RBC AUTO: 112 FL (ref 82–98)
MONOCYTES # BLD AUTO: 1.1 K/UL (ref 0.3–1)
MONOCYTES NFR BLD: 6.7 % (ref 4–15)
NEUTROPHILS # BLD AUTO: 14.2 K/UL (ref 1.8–7.7)
NEUTROPHILS NFR BLD: 86.2 % (ref 38–73)
NRBC BLD-RTO: 0 /100 WBC
PLATELET # BLD AUTO: 97 K/UL (ref 150–350)
PMV BLD AUTO: 12.8 FL (ref 9.2–12.9)
RBC # BLD AUTO: 3.14 M/UL (ref 4.6–6.2)
WBC # BLD AUTO: 16.43 K/UL (ref 3.9–12.7)

## 2020-01-08 PROCEDURE — 96374 THER/PROPH/DIAG INJ IV PUSH: CPT

## 2020-01-08 PROCEDURE — 96375 TX/PRO/DX INJ NEW DRUG ADDON: CPT

## 2020-01-08 PROCEDURE — 85025 COMPLETE CBC W/AUTO DIFF WBC: CPT

## 2020-01-08 PROCEDURE — 80053 COMPREHEN METABOLIC PANEL: CPT

## 2020-01-08 PROCEDURE — 86901 BLOOD TYPING SEROLOGIC RH(D): CPT

## 2020-01-08 PROCEDURE — 96361 HYDRATE IV INFUSION ADD-ON: CPT

## 2020-01-08 PROCEDURE — 63600175 PHARM REV CODE 636 W HCPCS: Performed by: EMERGENCY MEDICINE

## 2020-01-08 PROCEDURE — 12000002 HC ACUTE/MED SURGE SEMI-PRIVATE ROOM

## 2020-01-08 PROCEDURE — 83690 ASSAY OF LIPASE: CPT

## 2020-01-08 PROCEDURE — 99285 EMERGENCY DEPT VISIT HI MDM: CPT | Mod: 25

## 2020-01-08 RX ORDER — SODIUM CHLORIDE 9 MG/ML
1000 INJECTION, SOLUTION INTRAVENOUS
Status: COMPLETED | OUTPATIENT
Start: 2020-01-08 | End: 2020-01-09

## 2020-01-08 RX ORDER — ONDANSETRON 2 MG/ML
4 INJECTION INTRAMUSCULAR; INTRAVENOUS
Status: COMPLETED | OUTPATIENT
Start: 2020-01-08 | End: 2020-01-08

## 2020-01-08 RX ADMIN — ONDANSETRON 4 MG: 2 INJECTION INTRAMUSCULAR; INTRAVENOUS at 11:01

## 2020-01-08 RX ADMIN — SODIUM CHLORIDE 1000 ML: 0.9 INJECTION, SOLUTION INTRAVENOUS at 11:01

## 2020-01-09 PROBLEM — N17.9 AKI (ACUTE KIDNEY INJURY): Status: ACTIVE | Noted: 2020-01-09

## 2020-01-09 PROBLEM — K62.5 RECTAL BLEEDING: Status: ACTIVE | Noted: 2020-01-09

## 2020-01-09 PROBLEM — K57.92 DIVERTICULITIS: Status: ACTIVE | Noted: 2020-01-09

## 2020-01-09 LAB
ABO + RH BLD: NORMAL
ALBUMIN SERPL BCP-MCNC: 3.6 G/DL (ref 3.5–5.2)
ALP SERPL-CCNC: 118 U/L (ref 55–135)
ALT SERPL W/O P-5'-P-CCNC: 61 U/L (ref 10–44)
ANION GAP SERPL CALC-SCNC: 12 MMOL/L (ref 8–16)
ANION GAP SERPL CALC-SCNC: 19 MMOL/L (ref 8–16)
ANION GAP SERPL CALC-SCNC: 31 MMOL/L (ref 8–16)
AST SERPL-CCNC: 186 U/L (ref 10–40)
BACTERIA #/AREA URNS HPF: ABNORMAL /HPF
BASOPHILS # BLD AUTO: 0.02 K/UL (ref 0–0.2)
BASOPHILS # BLD AUTO: 0.03 K/UL (ref 0–0.2)
BASOPHILS # BLD AUTO: 0.04 K/UL (ref 0–0.2)
BASOPHILS NFR BLD: 0.2 % (ref 0–1.9)
BASOPHILS NFR BLD: 0.3 % (ref 0–1.9)
BASOPHILS NFR BLD: 0.4 % (ref 0–1.9)
BILIRUB DIRECT SERPL-MCNC: 3.3 MG/DL (ref 0.1–0.3)
BILIRUB SERPL-MCNC: 7.4 MG/DL (ref 0.1–1)
BILIRUB UR QL STRIP: ABNORMAL
BLD GP AB SCN CELLS X3 SERPL QL: NORMAL
BUN SERPL-MCNC: 23 MG/DL (ref 6–20)
BUN SERPL-MCNC: 26 MG/DL (ref 6–20)
BUN SERPL-MCNC: 31 MG/DL (ref 6–20)
CALCIUM SERPL-MCNC: 7.8 MG/DL (ref 8.7–10.5)
CALCIUM SERPL-MCNC: 8.1 MG/DL (ref 8.7–10.5)
CALCIUM SERPL-MCNC: 9.5 MG/DL (ref 8.7–10.5)
CHLORIDE SERPL-SCNC: 103 MMOL/L (ref 95–110)
CHLORIDE SERPL-SCNC: 103 MMOL/L (ref 95–110)
CHLORIDE SERPL-SCNC: 99 MMOL/L (ref 95–110)
CLARITY UR: CLEAR
CO2 SERPL-SCNC: 11 MMOL/L (ref 23–29)
CO2 SERPL-SCNC: 17 MMOL/L (ref 23–29)
CO2 SERPL-SCNC: 23 MMOL/L (ref 23–29)
COLOR UR: YELLOW
CREAT SERPL-MCNC: 2.8 MG/DL (ref 0.5–1.4)
CREAT SERPL-MCNC: 2.9 MG/DL (ref 0.5–1.4)
CREAT SERPL-MCNC: 2.9 MG/DL (ref 0.5–1.4)
CREAT UR-MCNC: 256.7 MG/DL (ref 23–375)
DIFFERENTIAL METHOD: ABNORMAL
EOSINOPHIL # BLD AUTO: 0 K/UL (ref 0–0.5)
EOSINOPHIL NFR BLD: 0 % (ref 0–8)
EOSINOPHIL NFR BLD: 0.1 % (ref 0–8)
EOSINOPHIL NFR BLD: 0.3 % (ref 0–8)
ERYTHROCYTE [DISTWIDTH] IN BLOOD BY AUTOMATED COUNT: 16.7 % (ref 11.5–14.5)
ERYTHROCYTE [DISTWIDTH] IN BLOOD BY AUTOMATED COUNT: 16.8 % (ref 11.5–14.5)
ERYTHROCYTE [DISTWIDTH] IN BLOOD BY AUTOMATED COUNT: 17 % (ref 11.5–14.5)
EST. GFR  (AFRICAN AMERICAN): 29 ML/MIN/1.73 M^2
EST. GFR  (AFRICAN AMERICAN): 29 ML/MIN/1.73 M^2
EST. GFR  (AFRICAN AMERICAN): 30 ML/MIN/1.73 M^2
EST. GFR  (NON AFRICAN AMERICAN): 25 ML/MIN/1.73 M^2
EST. GFR  (NON AFRICAN AMERICAN): 25 ML/MIN/1.73 M^2
EST. GFR  (NON AFRICAN AMERICAN): 26 ML/MIN/1.73 M^2
GLUCOSE SERPL-MCNC: 118 MG/DL (ref 70–110)
GLUCOSE SERPL-MCNC: 127 MG/DL (ref 70–110)
GLUCOSE SERPL-MCNC: 75 MG/DL (ref 70–110)
GLUCOSE UR QL STRIP: NEGATIVE
HAV IGM SERPL QL IA: NEGATIVE
HBV CORE IGM SERPL QL IA: NEGATIVE
HBV SURFACE AG SERPL QL IA: NEGATIVE
HCT VFR BLD AUTO: 29 % (ref 40–54)
HCT VFR BLD AUTO: 29 % (ref 40–54)
HCT VFR BLD AUTO: 29.3 % (ref 40–54)
HCV AB SERPL QL IA: NEGATIVE
HGB BLD-MCNC: 9.5 G/DL (ref 14–18)
HGB BLD-MCNC: 9.6 G/DL (ref 14–18)
HGB BLD-MCNC: 9.6 G/DL (ref 14–18)
HGB UR QL STRIP: ABNORMAL
IMM GRANULOCYTES # BLD AUTO: 0.02 K/UL (ref 0–0.04)
IMM GRANULOCYTES # BLD AUTO: 0.02 K/UL (ref 0–0.04)
IMM GRANULOCYTES # BLD AUTO: 0.06 K/UL (ref 0–0.04)
IMM GRANULOCYTES NFR BLD AUTO: 0.2 % (ref 0–0.5)
IMM GRANULOCYTES NFR BLD AUTO: 0.2 % (ref 0–0.5)
IMM GRANULOCYTES NFR BLD AUTO: 0.5 % (ref 0–0.5)
KETONES UR QL STRIP: ABNORMAL
LEUKOCYTE ESTERASE UR QL STRIP: ABNORMAL
LIPASE SERPL-CCNC: 132 U/L (ref 4–60)
LYMPHOCYTES # BLD AUTO: 1 K/UL (ref 1–4.8)
LYMPHOCYTES # BLD AUTO: 1.2 K/UL (ref 1–4.8)
LYMPHOCYTES # BLD AUTO: 1.3 K/UL (ref 1–4.8)
LYMPHOCYTES NFR BLD: 10.8 % (ref 18–48)
LYMPHOCYTES NFR BLD: 13.3 % (ref 18–48)
LYMPHOCYTES NFR BLD: 9.4 % (ref 18–48)
MCH RBC QN AUTO: 36 PG (ref 27–31)
MCH RBC QN AUTO: 36.1 PG (ref 27–31)
MCH RBC QN AUTO: 36.2 PG (ref 27–31)
MCHC RBC AUTO-ENTMCNC: 32.4 G/DL (ref 32–36)
MCHC RBC AUTO-ENTMCNC: 33.1 G/DL (ref 32–36)
MCHC RBC AUTO-ENTMCNC: 33.1 G/DL (ref 32–36)
MCV RBC AUTO: 109 FL (ref 82–98)
MCV RBC AUTO: 109 FL (ref 82–98)
MCV RBC AUTO: 111 FL (ref 82–98)
MICROSCOPIC COMMENT: ABNORMAL
MONOCYTES # BLD AUTO: 1.3 K/UL (ref 0.3–1)
MONOCYTES NFR BLD: 13.7 % (ref 4–15)
MONOCYTES NFR BLD: 13.9 % (ref 4–15)
MONOCYTES NFR BLD: 9.7 % (ref 4–15)
NEUTROPHILS # BLD AUTO: 10.5 K/UL (ref 1.8–7.7)
NEUTROPHILS # BLD AUTO: 7.1 K/UL (ref 1.8–7.7)
NEUTROPHILS # BLD AUTO: 7.2 K/UL (ref 1.8–7.7)
NEUTROPHILS NFR BLD: 72.4 % (ref 38–73)
NEUTROPHILS NFR BLD: 74.4 % (ref 38–73)
NEUTROPHILS NFR BLD: 80.2 % (ref 38–73)
NITRITE UR QL STRIP: NEGATIVE
NRBC BLD-RTO: 0 /100 WBC
PH UR STRIP: 6 [PH] (ref 5–8)
PLATELET # BLD AUTO: 62 K/UL (ref 150–350)
PLATELET # BLD AUTO: 62 K/UL (ref 150–350)
PLATELET # BLD AUTO: 73 K/UL (ref 150–350)
PMV BLD AUTO: 12.5 FL (ref 9.2–12.9)
PMV BLD AUTO: 12.5 FL (ref 9.2–12.9)
PMV BLD AUTO: 13.7 FL (ref 9.2–12.9)
POTASSIUM SERPL-SCNC: 4 MMOL/L (ref 3.5–5.1)
POTASSIUM SERPL-SCNC: 4.4 MMOL/L (ref 3.5–5.1)
POTASSIUM SERPL-SCNC: 5 MMOL/L (ref 3.5–5.1)
PROT SERPL-MCNC: 10.6 G/DL (ref 6–8.4)
PROT UR QL STRIP: ABNORMAL
RBC # BLD AUTO: 2.64 M/UL (ref 4.6–6.2)
RBC # BLD AUTO: 2.65 M/UL (ref 4.6–6.2)
RBC # BLD AUTO: 2.66 M/UL (ref 4.6–6.2)
RBC #/AREA URNS HPF: 3 /HPF (ref 0–4)
SODIUM SERPL-SCNC: 138 MMOL/L (ref 136–145)
SODIUM SERPL-SCNC: 139 MMOL/L (ref 136–145)
SODIUM SERPL-SCNC: 141 MMOL/L (ref 136–145)
SODIUM UR-SCNC: 40 MMOL/L (ref 20–250)
SP GR UR STRIP: >=1.03 (ref 1–1.03)
SQUAMOUS #/AREA URNS HPF: 2 /HPF
URN SPEC COLLECT METH UR: ABNORMAL
UROBILINOGEN UR STRIP-ACNC: 1 EU/DL
UUN UR-MCNC: 658 MG/DL (ref 140–1050)
WBC # BLD AUTO: 13.04 K/UL (ref 3.9–12.7)
WBC # BLD AUTO: 9.62 K/UL (ref 3.9–12.7)
WBC # BLD AUTO: 9.8 K/UL (ref 3.9–12.7)
WBC #/AREA URNS HPF: 40 /HPF (ref 0–5)
YEAST URNS QL MICRO: ABNORMAL

## 2020-01-09 PROCEDURE — 25000003 PHARM REV CODE 250: Performed by: PHYSICIAN ASSISTANT

## 2020-01-09 PROCEDURE — 25000003 PHARM REV CODE 250: Performed by: INTERNAL MEDICINE

## 2020-01-09 PROCEDURE — 63600175 PHARM REV CODE 636 W HCPCS: Performed by: PHYSICIAN ASSISTANT

## 2020-01-09 PROCEDURE — 84300 ASSAY OF URINE SODIUM: CPT

## 2020-01-09 PROCEDURE — 99233 SBSQ HOSP IP/OBS HIGH 50: CPT | Mod: ,,, | Performed by: INTERNAL MEDICINE

## 2020-01-09 PROCEDURE — 80048 BASIC METABOLIC PNL TOTAL CA: CPT

## 2020-01-09 PROCEDURE — 11000001 HC ACUTE MED/SURG PRIVATE ROOM

## 2020-01-09 PROCEDURE — 63600175 PHARM REV CODE 636 W HCPCS: Performed by: EMERGENCY MEDICINE

## 2020-01-09 PROCEDURE — 81000 URINALYSIS NONAUTO W/SCOPE: CPT

## 2020-01-09 PROCEDURE — 85025 COMPLETE CBC W/AUTO DIFF WBC: CPT | Mod: 91

## 2020-01-09 PROCEDURE — 82570 ASSAY OF URINE CREATININE: CPT

## 2020-01-09 PROCEDURE — 80074 ACUTE HEPATITIS PANEL: CPT

## 2020-01-09 PROCEDURE — S0030 INJECTION, METRONIDAZOLE: HCPCS | Performed by: PHYSICIAN ASSISTANT

## 2020-01-09 PROCEDURE — 99223 PR INITIAL HOSPITAL CARE,LEVL III: ICD-10-PCS | Mod: ,,, | Performed by: PHYSICIAN ASSISTANT

## 2020-01-09 PROCEDURE — 80048 BASIC METABOLIC PNL TOTAL CA: CPT | Mod: 91

## 2020-01-09 PROCEDURE — 87086 URINE CULTURE/COLONY COUNT: CPT

## 2020-01-09 PROCEDURE — S0030 INJECTION, METRONIDAZOLE: HCPCS | Performed by: EMERGENCY MEDICINE

## 2020-01-09 PROCEDURE — C9113 INJ PANTOPRAZOLE SODIUM, VIA: HCPCS | Performed by: EMERGENCY MEDICINE

## 2020-01-09 PROCEDURE — 99223 1ST HOSP IP/OBS HIGH 75: CPT | Mod: ,,, | Performed by: PHYSICIAN ASSISTANT

## 2020-01-09 PROCEDURE — 63600175 PHARM REV CODE 636 W HCPCS: Performed by: INTERNAL MEDICINE

## 2020-01-09 PROCEDURE — C9113 INJ PANTOPRAZOLE SODIUM, VIA: HCPCS | Performed by: PHYSICIAN ASSISTANT

## 2020-01-09 PROCEDURE — 94761 N-INVAS EAR/PLS OXIMETRY MLT: CPT

## 2020-01-09 PROCEDURE — 82248 BILIRUBIN DIRECT: CPT

## 2020-01-09 PROCEDURE — 99233 PR SUBSEQUENT HOSPITAL CARE,LEVL III: ICD-10-PCS | Mod: ,,, | Performed by: INTERNAL MEDICINE

## 2020-01-09 PROCEDURE — 36415 COLL VENOUS BLD VENIPUNCTURE: CPT

## 2020-01-09 PROCEDURE — 25000003 PHARM REV CODE 250: Performed by: EMERGENCY MEDICINE

## 2020-01-09 PROCEDURE — 84540 ASSAY OF URINE/UREA-N: CPT

## 2020-01-09 RX ORDER — DIPHENHYDRAMINE HCL 25 MG
25 CAPSULE ORAL EVERY 6 HOURS PRN
Status: DISCONTINUED | OUTPATIENT
Start: 2020-01-09 | End: 2020-01-13 | Stop reason: HOSPADM

## 2020-01-09 RX ORDER — ONDANSETRON 2 MG/ML
4 INJECTION INTRAMUSCULAR; INTRAVENOUS EVERY 8 HOURS PRN
Status: DISCONTINUED | OUTPATIENT
Start: 2020-01-09 | End: 2020-01-13 | Stop reason: HOSPADM

## 2020-01-09 RX ORDER — SODIUM CHLORIDE 0.9 % (FLUSH) 0.9 %
10 SYRINGE (ML) INJECTION
Status: DISCONTINUED | OUTPATIENT
Start: 2020-01-09 | End: 2020-01-13 | Stop reason: HOSPADM

## 2020-01-09 RX ORDER — SODIUM CHLORIDE 9 MG/ML
1000 INJECTION, SOLUTION INTRAVENOUS
Status: COMPLETED | OUTPATIENT
Start: 2020-01-09 | End: 2020-01-09

## 2020-01-09 RX ORDER — METRONIDAZOLE 500 MG/100ML
500 INJECTION, SOLUTION INTRAVENOUS
Status: DISCONTINUED | OUTPATIENT
Start: 2020-01-09 | End: 2020-01-13 | Stop reason: HOSPADM

## 2020-01-09 RX ORDER — ACETAMINOPHEN 325 MG/1
650 TABLET ORAL EVERY 6 HOURS PRN
Status: DISCONTINUED | OUTPATIENT
Start: 2020-01-09 | End: 2020-01-13 | Stop reason: HOSPADM

## 2020-01-09 RX ORDER — PROPRANOLOL HYDROCHLORIDE 10 MG/1
10 TABLET ORAL 3 TIMES DAILY
Status: DISCONTINUED | OUTPATIENT
Start: 2020-01-09 | End: 2020-01-13 | Stop reason: HOSPADM

## 2020-01-09 RX ORDER — CIPROFLOXACIN 2 MG/ML
400 INJECTION, SOLUTION INTRAVENOUS
Status: DISCONTINUED | OUTPATIENT
Start: 2020-01-09 | End: 2020-01-13 | Stop reason: HOSPADM

## 2020-01-09 RX ORDER — SODIUM CHLORIDE 9 MG/ML
1000 INJECTION, SOLUTION INTRAVENOUS CONTINUOUS
Status: ACTIVE | OUTPATIENT
Start: 2020-01-09 | End: 2020-01-09

## 2020-01-09 RX ORDER — PANTOPRAZOLE SODIUM 40 MG/10ML
40 INJECTION, POWDER, LYOPHILIZED, FOR SOLUTION INTRAVENOUS 2 TIMES DAILY
Status: DISCONTINUED | OUTPATIENT
Start: 2020-01-09 | End: 2020-01-13 | Stop reason: HOSPADM

## 2020-01-09 RX ORDER — PROPRANOLOL HYDROCHLORIDE 10 MG/1
10 TABLET ORAL
Status: COMPLETED | OUTPATIENT
Start: 2020-01-09 | End: 2020-01-09

## 2020-01-09 RX ORDER — HYDROMORPHONE HYDROCHLORIDE 1 MG/ML
1 INJECTION, SOLUTION INTRAMUSCULAR; INTRAVENOUS; SUBCUTANEOUS
Status: COMPLETED | OUTPATIENT
Start: 2020-01-09 | End: 2020-01-09

## 2020-01-09 RX ORDER — CIPROFLOXACIN 2 MG/ML
400 INJECTION, SOLUTION INTRAVENOUS
Status: COMPLETED | OUTPATIENT
Start: 2020-01-09 | End: 2020-01-09

## 2020-01-09 RX ORDER — METRONIDAZOLE 500 MG/100ML
500 INJECTION, SOLUTION INTRAVENOUS
Status: COMPLETED | OUTPATIENT
Start: 2020-01-09 | End: 2020-01-09

## 2020-01-09 RX ORDER — CIPROFLOXACIN 2 MG/ML
400 INJECTION, SOLUTION INTRAVENOUS
Status: DISCONTINUED | OUTPATIENT
Start: 2020-01-09 | End: 2020-01-09

## 2020-01-09 RX ORDER — PANTOPRAZOLE SODIUM 40 MG/10ML
40 INJECTION, POWDER, LYOPHILIZED, FOR SOLUTION INTRAVENOUS
Status: COMPLETED | OUTPATIENT
Start: 2020-01-09 | End: 2020-01-09

## 2020-01-09 RX ADMIN — PROMETHAZINE HYDROCHLORIDE 12.5 MG: 25 INJECTION INTRAMUSCULAR; INTRAVENOUS at 10:01

## 2020-01-09 RX ADMIN — PANTOPRAZOLE SODIUM 40 MG: 40 INJECTION, POWDER, LYOPHILIZED, FOR SOLUTION INTRAVENOUS at 04:01

## 2020-01-09 RX ADMIN — DIPHENHYDRAMINE HYDROCHLORIDE 25 MG: 25 CAPSULE ORAL at 09:01

## 2020-01-09 RX ADMIN — PROPRANOLOL HYDROCHLORIDE 10 MG: 10 TABLET ORAL at 03:01

## 2020-01-09 RX ADMIN — PANTOPRAZOLE SODIUM 40 MG: 40 INJECTION, POWDER, LYOPHILIZED, FOR SOLUTION INTRAVENOUS at 01:01

## 2020-01-09 RX ADMIN — CIPROFLOXACIN 400 MG: 2 INJECTION, SOLUTION INTRAVENOUS at 02:01

## 2020-01-09 RX ADMIN — DIPHENHYDRAMINE HYDROCHLORIDE 25 MG: 25 CAPSULE ORAL at 03:01

## 2020-01-09 RX ADMIN — METRONIDAZOLE 500 MG: 500 INJECTION, SOLUTION INTRAVENOUS at 08:01

## 2020-01-09 RX ADMIN — PROPRANOLOL HYDROCHLORIDE 10 MG: 10 TABLET ORAL at 01:01

## 2020-01-09 RX ADMIN — HYDROMORPHONE HYDROCHLORIDE 1 MG: 1 INJECTION, SOLUTION INTRAMUSCULAR; INTRAVENOUS; SUBCUTANEOUS at 12:01

## 2020-01-09 RX ADMIN — PROPRANOLOL HYDROCHLORIDE 10 MG: 10 TABLET ORAL at 08:01

## 2020-01-09 RX ADMIN — ACETAMINOPHEN 650 MG: 325 TABLET ORAL at 09:01

## 2020-01-09 RX ADMIN — PANTOPRAZOLE SODIUM 40 MG: 40 INJECTION, POWDER, LYOPHILIZED, FOR SOLUTION INTRAVENOUS at 09:01

## 2020-01-09 RX ADMIN — SODIUM CHLORIDE 1000 ML: 0.9 INJECTION, SOLUTION INTRAVENOUS at 02:01

## 2020-01-09 RX ADMIN — PROPRANOLOL HYDROCHLORIDE 10 MG: 10 TABLET ORAL at 09:01

## 2020-01-09 RX ADMIN — CIPROFLOXACIN 400 MG: 2 INJECTION, SOLUTION INTRAVENOUS at 03:01

## 2020-01-09 RX ADMIN — METRONIDAZOLE 500 MG: 500 INJECTION, SOLUTION INTRAVENOUS at 06:01

## 2020-01-09 RX ADMIN — METRONIDAZOLE 500 MG: 500 INJECTION, SOLUTION INTRAVENOUS at 01:01

## 2020-01-09 RX ADMIN — SODIUM CHLORIDE 1000 ML: 0.9 INJECTION, SOLUTION INTRAVENOUS at 08:01

## 2020-01-09 RX ADMIN — ACETAMINOPHEN 650 MG: 325 TABLET ORAL at 10:01

## 2020-01-09 NOTE — NURSING
Bladder scan done- 45cc; MD notified. Pt also put out 200cc of jess colored urine; urine specimen collected.

## 2020-01-09 NOTE — ASSESSMENT & PLAN NOTE
- Mr. Irvin Diaz is admitted to inpatient status   - he has had 2 episodes of dark red blood per rectum  - he had a colonoscopy showing diverticulosis approx. 1 year ago  - NPO  - GI Bleed pathways initiated   - H&H stable   - GI consulted   - monitor overnight   - PRN pain/nausea meds

## 2020-01-09 NOTE — ASSESSMENT & PLAN NOTE
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 Reggie Wilcox at [facility]

## 2020-01-09 NOTE — HPI
Mr. Irvin Diaz is a 44 y.o. male, with PMH of UGIB, alcoholic cirrhosis, esophageal varicies (s/p banding x 2, on propranolol), previously resected benign colonic lesion, diverticulosis per colonoscopy 1 year ago, who presented to [facility] ED on 1/8/20 with left sided abdominal pain, frequent stooling and passage of dark red blood twice just PTA. He stated over the past few days he has had increased frequency of passing stool, and decreased urination. He has been unable to tolerate PO, and has been nauseous despite PO zofran. He states he was on blood thinners in the past 2/2 PE, but has been off x 1 year. He was evaluated in the ED with imaging c/w diverticulitis and thickening of the left colonic wall. He was admitted to inpatient status.

## 2020-01-09 NOTE — ASSESSMENT & PLAN NOTE
- endorses reduced urination   - BUN &  Cr are elevated at 23 & 2.8 upon admission   - ENA studies & retroperitoneal US ordered   - s/p I fluids in ED   - monitor UOP and renal function

## 2020-01-09 NOTE — ED NOTES
Pt AAOx4, resp pattern even and non labored, denies any needs, all restroom needs met. Pt's bed locked in lowest position, call light within reach, WCTM.

## 2020-01-09 NOTE — ASSESSMENT & PLAN NOTE
- chronic  - associated w/ alcoholic cirrhosis of the liver  - patient states he has been sober >1 year  - acute hep panel ordered    - last hep panel negative 5/19   - Tbili elevated, check direct bili

## 2020-01-09 NOTE — ED TRIAGE NOTES
Pt presents to ed c/o LLQ abd pain that has been constant with episodes of n/v/d. Pt denies any fever or chills. The pt also states having 20 BMs withing the last day, with the last 2 being completely bloody. He states that he could fill the whole toilet bowl with both of those BMs. The pt admits to also being dizzy and weak. Pt AAOx4, resp pattern even and non labored.

## 2020-01-09 NOTE — ED PROVIDER NOTES
"Encounter Date: 1/8/2020    SCRIBE #1 NOTE: I, Latanyaalber Valderrama, am scribing for, and in the presence of, Dr. Dinero.       History     Chief Complaint   Patient presents with    Abdominal Pain     L abd pain radiating to L flank since last night.  +n/+v/+d.  denies any dysuria or polyuria, states he hasn't felt the urge to urinate since yesterday.  Reports hx of cirrhosis.    Rectal Bleeding     started today.  +fatigue and dizziness     Time seen by provider: 11:56 PM    This is a 44 y.o. male with hx of HTN, GI bleed, cirrhosis, and PE who presents with complaint of LUQ abdominal pain since yesterday. He describes the pain as constant and "stabbing." Pain feels similar to past variceal and diverticular bleed. He began having diarrhea last night, which continued today followed by two episodes of rectal bleeding without stool. He reports N/V today, racing heart, shortness of breath, and light headedness. Patient has been unable to tolerate fluids or metoprolol. He has also taken antiemetics. Patient was taken off blood thinners about one year ago. He denies fever. This is the extent of the patient's complaints at this time.    The history is provided by the patient.     Review of patient's allergies indicates:   Allergen Reactions    Morphine Itching     Past Medical History:   Diagnosis Date    Alcoholic cirrhosis of liver     Arthritis     Hypertension     Pulmonary embolism      Past Surgical History:   Procedure Laterality Date    COLON SURGERY      ESOPHAGOGASTRODUODENOSCOPY N/A 7/26/2019    Procedure: EGD (ESOPHAGOGASTRODUODENOSCOPY);  Surgeon: Brian Trivedi MD;  Location: [facility];  Service: Endoscopy;  Laterality: N/A;    HERNIA REPAIR       History reviewed. No pertinent family history.  Social History     Tobacco Use    Smoking status: Never Smoker   Substance Use Topics    Alcohol use: Not Currently     Comment: freq    Drug use: Yes     Types: Marijuana     Review of Systems "   Constitutional: Negative for fever.   HENT: Negative for sore throat.    Respiratory: Positive for shortness of breath.    Cardiovascular: Negative for chest pain.        Positive for racing heart.   Gastrointestinal: Positive for abdominal pain, anal bleeding, diarrhea, nausea and vomiting.   Genitourinary: Negative for dysuria.   Musculoskeletal: Negative for back pain.   Skin: Negative for rash.   Neurological: Positive for light-headedness.   Psychiatric/Behavioral: Negative for confusion.       Physical Exam     Initial Vitals [01/08/20 2240]   BP Pulse Resp Temp SpO2   (!) 162/80 (!) 113 16 98.9 °F (37.2 °C) 100 %      MAP       --         Physical Exam    Nursing note and vitals reviewed.  Constitutional: He appears well-developed and well-nourished. He is not diaphoretic. No distress.   HENT:   Head: Normocephalic and atraumatic.   Eyes: EOM are normal.   Neck: Normal range of motion. Neck supple.   Cardiovascular: Regular rhythm and normal heart sounds. Tachycardia present.  Exam reveals no gallop and no friction rub.    No murmur heard.  Pulmonary/Chest: Breath sounds normal. No respiratory distress. He has no wheezes. He has no rhonchi. He has no rales.   Abdominal: Soft. Bowel sounds are normal. He exhibits distension. There is tenderness (to palpation) in the left upper quadrant and left lower quadrant. There is no rebound and no guarding.   Genitourinary:   Genitourinary Comments: No visible external hemorrhoids and no palpable internal hemorrhoids. Trace amount of brown stool that is guaiac positive.   Musculoskeletal: He exhibits no edema.   Neurological: He is alert and oriented to person, place, and time.   Skin: Skin is warm and dry.         ED Course   Procedures  Labs Reviewed   CBC W/ AUTO DIFFERENTIAL - Abnormal; Notable for the following components:       Result Value    WBC 16.43 (*)     RBC 3.14 (*)     Hemoglobin 11.4 (*)     Hematocrit 35.3 (*)     Mean Corpuscular Volume 112 (*)      Mean Corpuscular Hemoglobin 36.3 (*)     RDW 17.1 (*)     Platelets 97 (*)     Gran # (ANC) 14.2 (*)     Immature Grans (Abs) 0.08 (*)     Mono # 1.1 (*)     Gran% 86.2 (*)     Lymph% 5.8 (*)     All other components within normal limits   COMPREHENSIVE METABOLIC PANEL - Abnormal; Notable for the following components:    CO2 11 (*)     BUN, Bld 23 (*)     Creatinine 2.8 (*)     Total Protein 10.6 (*)     Total Bilirubin 7.4 (*)      (*)     ALT 61 (*)     Anion Gap 31 (*)     eGFR if  30 (*)     eGFR if non  26 (*)     All other components within normal limits   LIPASE - Abnormal; Notable for the following components:    Lipase 132 (*)     All other components within normal limits   URINALYSIS, REFLEX TO URINE CULTURE   TYPE & SCREEN          Imaging Results          CT Abdomen Pelvis  Without Contrast (Final result)  Result time 01/09/20 00:59:54   Procedure changed from CT Abdomen Pelvis With Contrast     Final result by Britta Ma MD (01/09/20 00:59:54)                 Impression:      Stigmata of cirrhosis, including varices and splenomegaly.  Hepatic steatosis.    Colonic diverticulosis.  Prominent thickening of the wall of the decompressed descending colon.  Findings may represent early or resolved diverticulitis.    Mild prostatomegaly.    AVN of the left hip.    Small hiatal hernia.      Electronically signed by: Britta Ma  Date:    01/09/2020  Time:    00:59             Narrative:    EXAMINATION:  CT ABDOMEN PELVIS WITHOUT    CLINICAL HISTORY:  Left abdominal pain radiating to the left flank since last night.    TECHNIQUE:  5 mm unenhanced axial images from the lung bases through the greater trochanters were performed.  Coronal and sagittal reformatted images were provided.    COMPARISON:  None.    FINDINGS:  Within the limits of a noncontrast examination, the pancreas, and adrenal glands are unremarkable.  The gallbladder contains no calcified  gallstones.    There is fatty infiltration of the liver.  The margins of the liver are lobular suggesting cirrhosis.    There is no hydronephrosis.  There are no intrarenal calculi.  There is mild bilateral perinephric stranding.    The spleen is mildly enlarged.    There is no gross abdominal adenopathy or ascites. There is infiltration throughout the mesenteric fat.  There is a tiny fat containing umbilical hernia.    There is a prominent thickening of the left colonic wall.  Colonic diverticulosis is present.  Anastomotic changes are seen involving right colon.  There are no pelvic masses or adenopathy.  There is no free pelvic fluid.  The prostate gland is mildly enlarged.  Consider correlation PSA.    At the lung bases, there is mild bibasilar atelectatic change.  There is a small hiatal hernia.    There is chronic collapse of the left femoral head with degenerative change, which suggest AVN.  Fluid is seen about left hip joint again.                                 Medical Decision Making:   History:   Old Medical Records: I decided to obtain old medical records.  Clinical Tests:   Lab Tests: Ordered and Reviewed  Radiological Study: Ordered and Reviewed  ED Management:  1:17 AM - Discussed case with Chio Mendoza PA-C, and will admit patient to Dr. Becker.    Additional MDM:   Comments: 44-year-old male with history of cirrhosis, esophageal varices and diverticular bleed presents complaining of left-sided abdominal pain since last night with associated nausea, vomiting, diarrhea and to episodes of rectal bleeding tonight.  Triage vital signs significant for tachycardia.  The patient does report a history of tachycardia for which she takes propranolol.  He reports taking his dose today but vomiting shortly thereafter.  On exam he has left upper and lower quadrant tenderness to palpation.  On rectal exam he had no blood present but trace amount of stool was guaiac positive. Labs, urinalysis, and CT of the  abdomen and pelvis will be ordered and IV fluids, Protonix, Zofran, and morphine given.    Labs significant for leukocytosis with a left shift.  H&H slightly improved compared to previous.  BUN and creatinine are double compared to most recent.  His total bilirubin and LFTs have also trended up compared to previous.  CT of the abdomen pelvis with contrast concerning for early diverticulitis.  Will give additional IV fluids as well as Cipro and Flagyl.  Patient will be admitted to the hospitalist service for further evaluation and monitoring.  The case has been discussed with Chio Mendoza the patient has been admitted to Dr. Becker's service..          Scribe Attestation:   Scribe #1: I performed the above scribed service and the documentation accurately describes the services I performed. I attest to the accuracy of the note.    Attending Attestation:           Physician Attestation for Scribe:  Physician Attestation Statement for Scribe #1: I, Dr. Dinero, reviewed documentation, as scribed by Latanya Valderrama in my presence, and it is both accurate and complete.                               Clinical Impression:     1. Rectal bleeding    2. ENA (acute kidney injury)    3. Diverticulitis                              Angela Dinero MD  01/09/20 6368

## 2020-01-09 NOTE — CONSULTS
Gastroenterology Consult    1/9/2020 5:59 AM    Patient Name: Irvin Diaz  MRN: [id number]  Admission Date: 1/8/2020  Hospital Length of Stay: 0 days  Code Status: Full Code   Primary Care Physician: [facility]  Principal Problem:<principal problem not specified>  Consulting Physician: Inpatient consult to Gastroenterology  Consult performed by: Cyndee Bajwa MD  Consult ordered by: Chio Mendoza PA-C        Reason for consultation: cirrhosis, rectal bleeding    HPI:  Irvin Diaz is a 44 y.o. male with a history of HTN, prior PE, arthritis and ETOH cirrhosis complicated by esophageal varices (that have bled and been banded) who presented with LUQ pain as well as nausea, vomiting and diarrhea.  The diarrhea began on Tuesday with about 20 bowel movements and then he developed LUQ pain.  He had nausea and non-bloody emesis.  Then yesterday, he had kenia blood in the stool which was dark.  He has had similar pain to this before, which he gets with his GI bleeds.  He has also had diverticular bleeding in the past.  He has a history of colon resection for a benign mass which was causing obstruction (10+ years ago).  He denies any history of ascites or encephalopathy.  He denies sick contacts or eating raw/unusual foods.  He did have a slightly elevated temperature.  He quit drinking a few years ago and then relapsed and has now been sober again for the last 2-3 months.      Past Medical History:   Diagnosis Date    Alcoholic cirrhosis of liver     Arthritis     Hypertension     Pulmonary embolism        Past Surgical History:   Procedure Laterality Date    COLON SURGERY      ESOPHAGOGASTRODUODENOSCOPY N/A 7/26/2019    Procedure: EGD (ESOPHAGOGASTRODUODENOSCOPY);  Surgeon: Brian Trivedi MD;  Location: [facility];  Service: Endoscopy;  Laterality: N/A;    HERNIA REPAIR         Social History     Tobacco Use    Smoking status: Never Smoker   Substance Use Topics     Alcohol use: Not Currently     Comment: mary    Drug use: Yes     Types: Marijuana        History reviewed. No pertinent family history.  No family history of colon cancer or liver disease.      Review of patient's allergies indicates:   Allergen Reactions    Morphine Itching         Current Facility-Administered Medications:     ciprofloxacin (CIPRO)400mg/200ml D5W IVPB 400 mg, 400 mg, Intravenous, Q12H, Chio Mendoza PA-C    ondansetron injection 4 mg, 4 mg, Intravenous, Q8H PRN, Chio Mendoza PA-C    pantoprazole injection 40 mg, 40 mg, Intravenous, BID, Chio Mendoza PA-C, 40 mg at 01/09/20 0434    propranolol tablet 10 mg, 10 mg, Oral, TID, Chio Mendoza PA-C    sodium chloride 0.9% flush 10 mL, 10 mL, Intravenous, PRN, Angela Dinero MD    Review of Systems   Constitutional: Negative for chills, fever and weight loss.   HENT: Negative.    Eyes: Negative.    Respiratory: Negative.    Cardiovascular: Negative.    Gastrointestinal: Positive for abdominal pain, blood in stool, diarrhea, nausea and vomiting. Negative for constipation, heartburn and melena.   Genitourinary: Negative.         Difficulty urinating   Musculoskeletal: Negative.    Skin: Negative.    Neurological: Negative.    Endo/Heme/Allergies: Negative.        /75 (Patient Position: Sitting)   Pulse 89   Temp 98.9 °F (37.2 °C) (Oral)   Resp 18   Ht 6' (1.829 m)   Wt 83.2 kg (183 lb 6.8 oz)   SpO2 95%   BMI 24.88 kg/m²   Physical Exam   Constitutional: He is oriented to person, place, and time. He appears well-developed and well-nourished. No distress.   HENT:   Head: Normocephalic and atraumatic.   Mouth/Throat: Oropharynx is clear and moist.   Eyes: Pupils are equal, round, and reactive to light. EOM are normal. No scleral icterus.   Cardiovascular: Normal rate, regular rhythm and normal heart sounds.   No murmur heard.  Pulmonary/Chest: Effort normal and breath sounds normal. No respiratory distress.    Abdominal: Soft. Bowel sounds are normal. He exhibits no distension. There is tenderness (LUQ). There is no rebound and no guarding.   Musculoskeletal: Normal range of motion. He exhibits no edema.   Neurological: He is alert and oriented to person, place, and time.   Skin: Skin is warm and dry. No rash noted.   Vitals reviewed.      Labs:  Lab Results   Component Value Date/Time    WBC 16.43 (H) 01/08/2020 11:43 PM    HGB 11.4 (L) 01/08/2020 11:43 PM    HCT 35.3 (L) 01/08/2020 11:43 PM    PLT 97 (L) 01/08/2020 11:43 PM     (H) 01/08/2020 11:43 PM     01/09/2020 03:31 AM     09/02/2018 05:08 AM    K 4.4 01/09/2020 03:31 AM     01/09/2020 03:31 AM     09/02/2018 05:08 AM    CO2 17 (L) 01/09/2020 03:31 AM    BUN 26 (H) 01/09/2020 03:31 AM    CREATININE 2.9 (H) 01/09/2020 03:31 AM    CREATININE 0.91 09/02/2018 05:08 AM     (H) 01/09/2020 03:31 AM    GLU 99 09/02/2018 05:08 AM    CALCIUM 7.8 (L) 01/09/2020 03:31 AM    MG 1.6 07/27/2019 05:16 AM    PHOS 3.1 07/26/2019 03:40 AM    INR 1.2 09/23/2019 08:18 AM    APTT 32.6 (H) 09/23/2019 08:18 AM   ]  Lab Results   Component Value Date/Time    PROT 10.6 (H) 01/08/2020 11:43 PM    ALBUMIN 3.6 01/08/2020 11:43 PM    BILITOT 7.4 (H) 01/08/2020 11:43 PM    BILIDIR 2.9 (H) 07/27/2019 05:16 AM     (H) 01/08/2020 11:43 PM    ALT 61 (H) 01/08/2020 11:43 PM    ALKPHOS 118 01/08/2020 11:43 PM   ]    Imaging and Procedures:  I personally reviewed the imaging/procedures below.  CT A/P 1/9/20:  Stigmata of cirrhosis, including varices and splenomegaly.  Hepatic steatosis.  Colonic diverticulosis.  Prominent thickening of the wall of the decompressed descending colon.  Findings may represent early or resolved diverticulitis.  Mild prostatomegaly.  AVN of the left hip.  Small hiatal hernia.    Assessment:  Irvin Diaz is a 44 y.o. male with a history of HTN, prior PE, arthritis and ETOH cirrhosis complicated by esophageal varices who  presented with LUQ pain as well as nausea, vomiting and diarrhea. Suspect diverticulitis based on CT findings with limited rectal bleeding - brown stool noted on ER rectal exam.    Plan:  - cipro/flagyl  - monitor serial H/H and for evidence of ongoing overt bleeding (low suspicion for variceal bleeding with low BUN)  - continue beta blocker and PPI  - no plans for endoscopy for now unless ongoing blood loss      Cyndee Bajwa MD

## 2020-01-09 NOTE — H&P
[facility] Hospital Medicine  History & Physical    Patient Name: Irvin Diaz  MRN: [id number]  Admission Date: 1/8/2020  Attending Physician: Roberto Becker MD   Primary Care Provider: [facility]         Patient information was obtained from patient, past medical records and ER records.     Subjective:     Principal Problem:Rectal bleeding    Chief Complaint:   Chief Complaint   Patient presents with    Abdominal Pain     L abd pain radiating to L flank since last night.  +n/+v/+d.  denies any dysuria or polyuria, states he hasn't felt the urge to urinate since yesterday.  Reports hx of cirrhosis.    Rectal Bleeding     started today.  +fatigue and dizziness        HPI: Mr. Irvin Diaz is a 44 y.o. male, with PMH of UGIB, alcoholic cirrhosis, esophageal varicies (s/p banding x 2, on propranolol), previously resected benign colonic lesion, diverticulosis per colonoscopy 1 year ago, who presented to [facility] ED on 1/8/20 with left sided abdominal pain, frequent stooling and passage of dark red blood twice just PTA. He stated over the past few days he has had increased frequency of passing stool, and decreased urination. He has been unable to tolerate PO, and has been nauseous despite PO zofran. He states he was on blood thinners in the past 2/2 PE, but has been off x 1 year. He was evaluated in the ED with imaging c/w diverticulitis and thickening of the left colonic wall. He was admitted to inpatient status.     Past Medical History:   Diagnosis Date    Alcoholic cirrhosis of liver     Arthritis     Hypertension     Pulmonary embolism        Past Surgical History:   Procedure Laterality Date    COLON SURGERY      ESOPHAGOGASTRODUODENOSCOPY N/A 7/26/2019    Procedure: EGD (ESOPHAGOGASTRODUODENOSCOPY);  Surgeon: Brian Trivedi MD;  Location: [facility];  Service: Endoscopy;  Laterality: N/A;    HERNIA REPAIR         Review of patient's allergies  indicates:   Allergen Reactions    Morphine Itching       No current facility-administered medications on file prior to encounter.      Current Outpatient Medications on File Prior to Encounter   Medication Sig    ondansetron (ZOFRAN-ODT) 4 MG TbDL Take 1 tablet (4 mg total) by mouth every 6 (six) hours as needed.    pantoprazole (PROTONIX) 40 MG tablet Take 1 tablet (40 mg total) by mouth once daily.    propranolol (INDERAL) 10 MG tablet Take 1 tablet (10 mg total) by mouth 3 (three) times daily.    traZODone (DESYREL) 50 MG tablet Take 50 mg by mouth every evening.     Family History     None        Tobacco Use    Smoking status: Never Smoker   Substance and Sexual Activity    Alcohol use: Not Currently     Comment: freq    Drug use: Yes     Types: Marijuana    Sexual activity: Not on file     Comment: occ     Review of Systems   Constitutional: Positive for appetite change (decreased). Negative for chills, diaphoresis and fever.   Respiratory: Negative for cough, shortness of breath and wheezing.    Cardiovascular: Negative for chest pain and palpitations.   Gastrointestinal: Positive for abdominal pain, blood in stool, diarrhea and nausea. Negative for constipation and vomiting.   Genitourinary: Positive for decreased urine volume. Negative for difficulty urinating, dysuria, frequency, hematuria and urgency.   Musculoskeletal: Negative for back pain, neck pain and neck stiffness.   Skin: Negative for rash and wound.   Neurological: Positive for dizziness and light-headedness. Negative for syncope, weakness and headaches.   Psychiatric/Behavioral: Negative for confusion and decreased concentration.     Objective:     Vital Signs (Most Recent):  Temp: 98.9 °F (37.2 °C) (01/09/20 0258)  Pulse: 88 (01/09/20 0600)  Resp: 18 (01/09/20 0258)  BP: 138/75 (01/09/20 0337)  SpO2: 95 % (01/09/20 0258) Vital Signs (24h Range):  Temp:  [98.9 °F (37.2 °C)] 98.9 °F (37.2 °C)  Pulse:  [] 88  Resp:  [16-18]  18  SpO2:  [95 %-100 %] 95 %  BP: (133-162)/(70-80) 138/75     Weight: 83.2 kg (183 lb 6.8 oz)  Body mass index is 24.88 kg/m².    Physical Exam   Constitutional: He is oriented to person, place, and time. Vital signs are normal. He appears well-developed and well-nourished.  Non-toxic appearance. He does not have a sickly appearance. He does not appear ill. No distress.   HENT:   Head: Normocephalic and atraumatic.   Right Ear: External ear normal.   Left Ear: External ear normal.   Eyes: Pupils are equal, round, and reactive to light. Conjunctivae and EOM are normal. No scleral icterus.   Neck: Normal range of motion. Neck supple. No JVD present. No tracheal deviation present.   Cardiovascular: Normal rate, regular rhythm, normal heart sounds and intact distal pulses. Exam reveals no gallop and no friction rub.   No murmur heard.  Pulmonary/Chest: Effort normal and breath sounds normal. No stridor. No respiratory distress. He has no wheezes. He has no rales.   Abdominal: Soft. Bowel sounds are normal. He exhibits no distension and no mass. There is tenderness (left-sided ). There is guarding (left-sided).   Neurological: He is alert and oriented to person, place, and time.   Skin: Skin is warm and dry. He is not diaphoretic.   Psychiatric: He has a normal mood and affect. His behavior is normal. Judgment and thought content normal.   Nursing note and vitals reviewed.        CRANIAL NERVES     CN III, IV, VI   Pupils are equal, round, and reactive to light.  Extraocular motions are normal.        Significant Labs:   BMP:   Recent Labs   Lab 01/09/20  0331   *      K 4.4      CO2 17*   BUN 26*   CREATININE 2.9*   CALCIUM 7.8*     CBC:   Recent Labs   Lab 01/08/20  2343   WBC 16.43*   HGB 11.4*   HCT 35.3*   PLT 97*     CMP:   Recent Labs   Lab 01/08/20  2343 01/09/20  0331    139   K 5.0 4.4   CL 99 103   CO2 11* 17*   GLU 75 127*   BUN 23* 26*   CREATININE 2.8* 2.9*   CALCIUM 9.5 7.8*    PROT 10.6*  --    ALBUMIN 3.6  --    BILITOT 7.4*  --    ALKPHOS 118  --    *  --    ALT 61*  --    ANIONGAP 31* 19*   EGFRNONAA 26* 25*     Urine Culture: No results for input(s): LABURIN in the last 48 hours.  Urine Studies: No results for input(s): COLORU, APPEARANCEUA, PHUR, SPECGRAV, PROTEINUA, GLUCUA, KETONESU, BILIRUBINUA, OCCULTUA, NITRITE, UROBILINOGEN, LEUKOCYTESUR, RBCUA, WBCUA, BACTERIA, SQUAMEPITHEL, HYALINECASTS in the last 48 hours.    Invalid input(s): WRIGHTSUR  All pertinent labs within the past 24 hours have been reviewed.    Significant Imaging: I have reviewed all pertinent imaging results/findings within the past 24 hours.   Imaging Results          CT Abdomen Pelvis  Without Contrast (Final result)  Result time 01/09/20 00:59:54   Procedure changed from CT Abdomen Pelvis With Contrast     Final result by Britta Ma MD (01/09/20 00:59:54)                 Impression:      Stigmata of cirrhosis, including varices and splenomegaly.  Hepatic steatosis.    Colonic diverticulosis.  Prominent thickening of the wall of the decompressed descending colon.  Findings may represent early or resolved diverticulitis.    Mild prostatomegaly.    AVN of the left hip.    Small hiatal hernia.      Electronically signed by: Britta Ma  Date:    01/09/2020  Time:    00:59             Narrative:    EXAMINATION:  CT ABDOMEN PELVIS WITHOUT    CLINICAL HISTORY:  Left abdominal pain radiating to the left flank since last night.    TECHNIQUE:  5 mm unenhanced axial images from the lung bases through the greater trochanters were performed.  Coronal and sagittal reformatted images were provided.    COMPARISON:  None.    FINDINGS:  Within the limits of a noncontrast examination, the pancreas, and adrenal glands are unremarkable.  The gallbladder contains no calcified gallstones.    There is fatty infiltration of the liver.  The margins of the liver are lobular suggesting cirrhosis.    There is no  hydronephrosis.  There are no intrarenal calculi.  There is mild bilateral perinephric stranding.    The spleen is mildly enlarged.    There is no gross abdominal adenopathy or ascites. There is infiltration throughout the mesenteric fat.  There is a tiny fat containing umbilical hernia.    There is a prominent thickening of the left colonic wall.  Colonic diverticulosis is present.  Anastomotic changes are seen involving right colon.  There are no pelvic masses or adenopathy.  There is no free pelvic fluid.  The prostate gland is mildly enlarged.  Consider correlation PSA.    At the lung bases, there is mild bibasilar atelectatic change.  There is a small hiatal hernia.    There is chronic collapse of the left femoral head with degenerative change, which suggest AVN.  Fluid is seen about left hip joint again.                                 Assessment/Plan:     * Rectal bleeding  - Mr. Irvin Diaz is admitted to inpatient status   - he has had 2 episodes of dark red blood per rectum  - he had a colonoscopy showing diverticulosis approx. 1 year ago  - NPO  - GI Bleed pathways initiated   - H&H stable   - GI consulted   - monitor overnight   - PRN pain/nausea meds       ENA (acute kidney injury)  - endorses reduced urination   - BUN &  Cr are elevated at 23 & 2.8 upon admission   - ENA studies & retroperitoneal US ordered   - s/p I fluids in ED   - monitor UOP and renal function       Diverticulitis  - cipro/flagyl ordered in ED, continue   - no abscess/perforation on imaging  - monitor     Elevated LFTs  - chronic  - associated w/ alcoholic cirrhosis of the liver  - patient states he has been sober >1 year  - acute hep panel ordered    - last hep panel negative 5/19   - Tbili elevated, check direct bili       Benign essential HTN  - normotensive at present   - Continue propranolol   - monitor    Esophageal varices without bleeding  - s/p banding x 2   - follows w/ GI @ [facility]   - last EGD 5/19 showed these were  stable  - last banding 10/18   - continue PPI via IV while NPO  - continue propranolol      History of pulmonary embolus (PE)  - no longer anticoagulated       Thrombocytopenia  - stable w/ prior measurements   - suspect 2/2 cirrhosis   - monitor      Alcoholic cirrhosis  - states he used to drink  - now he is sober x 10/18   - follows w/ GI: Dr. Reggie Wilcox at [facility]     VTE Risk Mitigation (From admission, onward)         Ordered     IP VTE LOW RISK PATIENT  Once      01/09/20 0246     Place sequential compression device  Until discontinued      01/09/20 0246     Reason for No Pharmacological VTE Prophylaxis  Once     Question:  Reasons:  Answer:  Active Bleeding    01/09/20 0246                   Chio Mendoza PA-C  Department of Hospital Medicine   [facility]

## 2020-01-09 NOTE — SUBJECTIVE & OBJECTIVE
Past Medical History:   Diagnosis Date    Alcoholic cirrhosis of liver     Arthritis     Hypertension     Pulmonary embolism        Past Surgical History:   Procedure Laterality Date    COLON SURGERY      ESOPHAGOGASTRODUODENOSCOPY N/A 7/26/2019    Procedure: EGD (ESOPHAGOGASTRODUODENOSCOPY);  Surgeon: Brian Trivedi MD;  Location: [facility];  Service: Endoscopy;  Laterality: N/A;    HERNIA REPAIR         Review of patient's allergies indicates:   Allergen Reactions    Morphine Itching       No current facility-administered medications on file prior to encounter.      Current Outpatient Medications on File Prior to Encounter   Medication Sig    ondansetron (ZOFRAN-ODT) 4 MG TbDL Take 1 tablet (4 mg total) by mouth every 6 (six) hours as needed.    pantoprazole (PROTONIX) 40 MG tablet Take 1 tablet (40 mg total) by mouth once daily.    propranolol (INDERAL) 10 MG tablet Take 1 tablet (10 mg total) by mouth 3 (three) times daily.    traZODone (DESYREL) 50 MG tablet Take 50 mg by mouth every evening.     Family History     None        Tobacco Use    Smoking status: Never Smoker   Substance and Sexual Activity    Alcohol use: Not Currently     Comment: freq    Drug use: Yes     Types: Marijuana    Sexual activity: Not on file     Comment: occ     Review of Systems   Constitutional: Positive for appetite change (decreased). Negative for chills, diaphoresis and fever.   Respiratory: Negative for cough, shortness of breath and wheezing.    Cardiovascular: Negative for chest pain and palpitations.   Gastrointestinal: Positive for abdominal pain, blood in stool, diarrhea and nausea. Negative for constipation and vomiting.   Genitourinary: Positive for decreased urine volume. Negative for difficulty urinating, dysuria, frequency, hematuria and urgency.   Musculoskeletal: Negative for back pain, neck pain and neck stiffness.   Skin: Negative for rash and wound.   Neurological: Positive for dizziness and  light-headedness. Negative for syncope, weakness and headaches.   Psychiatric/Behavioral: Negative for confusion and decreased concentration.     Objective:     Vital Signs (Most Recent):  Temp: 98.9 °F (37.2 °C) (01/09/20 0258)  Pulse: 88 (01/09/20 0600)  Resp: 18 (01/09/20 0258)  BP: 138/75 (01/09/20 0337)  SpO2: 95 % (01/09/20 0258) Vital Signs (24h Range):  Temp:  [98.9 °F (37.2 °C)] 98.9 °F (37.2 °C)  Pulse:  [] 88  Resp:  [16-18] 18  SpO2:  [95 %-100 %] 95 %  BP: (133-162)/(70-80) 138/75     Weight: 83.2 kg (183 lb 6.8 oz)  Body mass index is 24.88 kg/m².    Physical Exam   Constitutional: He is oriented to person, place, and time. Vital signs are normal. He appears well-developed and well-nourished.  Non-toxic appearance. He does not have a sickly appearance. He does not appear ill. No distress.   HENT:   Head: Normocephalic and atraumatic.   Right Ear: External ear normal.   Left Ear: External ear normal.   Eyes: Pupils are equal, round, and reactive to light. Conjunctivae and EOM are normal. No scleral icterus.   Neck: Normal range of motion. Neck supple. No JVD present. No tracheal deviation present.   Cardiovascular: Normal rate, regular rhythm, normal heart sounds and intact distal pulses. Exam reveals no gallop and no friction rub.   No murmur heard.  Pulmonary/Chest: Effort normal and breath sounds normal. No stridor. No respiratory distress. He has no wheezes. He has no rales.   Abdominal: Soft. Bowel sounds are normal. He exhibits no distension and no mass. There is tenderness (left-sided ). There is guarding (left-sided).   Neurological: He is alert and oriented to person, place, and time.   Skin: Skin is warm and dry. He is not diaphoretic.   Psychiatric: He has a normal mood and affect. His behavior is normal. Judgment and thought content normal.   Nursing note and vitals reviewed.        CRANIAL NERVES     CN III, IV, VI   Pupils are equal, round, and reactive to light.  Extraocular motions  are normal.        Significant Labs:   BMP:   Recent Labs   Lab 01/09/20  0331   *      K 4.4      CO2 17*   BUN 26*   CREATININE 2.9*   CALCIUM 7.8*     CBC:   Recent Labs   Lab 01/08/20  2343   WBC 16.43*   HGB 11.4*   HCT 35.3*   PLT 97*     CMP:   Recent Labs   Lab 01/08/20  2343 01/09/20  0331    139   K 5.0 4.4   CL 99 103   CO2 11* 17*   GLU 75 127*   BUN 23* 26*   CREATININE 2.8* 2.9*   CALCIUM 9.5 7.8*   PROT 10.6*  --    ALBUMIN 3.6  --    BILITOT 7.4*  --    ALKPHOS 118  --    *  --    ALT 61*  --    ANIONGAP 31* 19*   EGFRNONAA 26* 25*     Urine Culture: No results for input(s): LABURIN in the last 48 hours.  Urine Studies: No results for input(s): COLORU, APPEARANCEUA, PHUR, SPECGRAV, PROTEINUA, GLUCUA, KETONESU, BILIRUBINUA, OCCULTUA, NITRITE, UROBILINOGEN, LEUKOCYTESUR, RBCUA, WBCUA, BACTERIA, SQUAMEPITHEL, HYALINECASTS in the last 48 hours.    Invalid input(s): WRIGHTSUR  All pertinent labs within the past 24 hours have been reviewed.    Significant Imaging: I have reviewed all pertinent imaging results/findings within the past 24 hours.   Imaging Results          CT Abdomen Pelvis  Without Contrast (Final result)  Result time 01/09/20 00:59:54   Procedure changed from CT Abdomen Pelvis With Contrast     Final result by Britta Ma MD (01/09/20 00:59:54)                 Impression:      Stigmata of cirrhosis, including varices and splenomegaly.  Hepatic steatosis.    Colonic diverticulosis.  Prominent thickening of the wall of the decompressed descending colon.  Findings may represent early or resolved diverticulitis.    Mild prostatomegaly.    AVN of the left hip.    Small hiatal hernia.      Electronically signed by: Britta Ma  Date:    01/09/2020  Time:    00:59             Narrative:    EXAMINATION:  CT ABDOMEN PELVIS WITHOUT    CLINICAL HISTORY:  Left abdominal pain radiating to the left flank since last night.    TECHNIQUE:  5 mm unenhanced axial  images from the lung bases through the greater trochanters were performed.  Coronal and sagittal reformatted images were provided.    COMPARISON:  None.    FINDINGS:  Within the limits of a noncontrast examination, the pancreas, and adrenal glands are unremarkable.  The gallbladder contains no calcified gallstones.    There is fatty infiltration of the liver.  The margins of the liver are lobular suggesting cirrhosis.    There is no hydronephrosis.  There are no intrarenal calculi.  There is mild bilateral perinephric stranding.    The spleen is mildly enlarged.    There is no gross abdominal adenopathy or ascites. There is infiltration throughout the mesenteric fat.  There is a tiny fat containing umbilical hernia.    There is a prominent thickening of the left colonic wall.  Colonic diverticulosis is present.  Anastomotic changes are seen involving right colon.  There are no pelvic masses or adenopathy.  There is no free pelvic fluid.  The prostate gland is mildly enlarged.  Consider correlation PSA.    At the lung bases, there is mild bibasilar atelectatic change.  There is a small hiatal hernia.    There is chronic collapse of the left femoral head with degenerative change, which suggest AVN.  Fluid is seen about left hip joint again.

## 2020-01-09 NOTE — PLAN OF CARE
Initial Discharge Planning Assessment:  Patient admitted on 1/8/2020    Chart reviewed, Care plan discussed with treatment team,  attending Dr. Becker    PCP updated in Epic: Pt states [facility], but cannot recall the name of his new PCP.   Pharmacy, updated in Epic: [facility] on [address]    DME at home: none    Current dispo: Home  Transportation: wife to transport home    Power of  or Living Will: none    Case management  to follow.  No anticipated DC needs from CM perspective.       01/09/20 1525   Discharge Assessment   Assessment Type Discharge Planning Assessment   Confirmed/corrected address and phone number on facesheet? Yes   Assessment information obtained from? Patient   Communicated expected length of stay with patient/caregiver yes   Prior to hospitilization cognitive status: Alert/Oriented   Prior to hospitalization functional status: Independent   Current cognitive status: Alert/Oriented   Current Functional Status: Independent   Lives With spouse   Able to Return to Prior Arrangements yes   Is patient able to care for self after discharge? Yes   Who are your caregiver(s) and their phone number(s)? Florentino Diaz (wife) [phone]   Patient's perception of discharge disposition home or selfcare   Readmission Within the Last 30 Days no previous admission in last 30 days   Patient currently being followed by outpatient case management? No   Patient currently receives any other outside agency services? No   Equipment Currently Used at Home none   Do you have any problems affording any of your prescribed medications? No   Is the patient taking medications as prescribed? yes   Does the patient receive services at the Coumadin Clinic? No   Discharge Plan A Home   DME Needed Upon Discharge  none   Patient/Family in Agreement with Plan yes

## 2020-01-09 NOTE — NURSING
Pt c/o itching and requests Benadryl; thinks the Promethazine is the cause. No s/s of hives or resp distress. Dr. Becker notified, Benadryl 25 mg PO ordered and given. Will continue to monitor.

## 2020-01-10 LAB
ANION GAP SERPL CALC-SCNC: 6 MMOL/L (ref 8–16)
ANION GAP SERPL CALC-SCNC: 9 MMOL/L (ref 8–16)
BACTERIA UR CULT: NORMAL
BASOPHILS # BLD AUTO: 0.02 K/UL (ref 0–0.2)
BASOPHILS NFR BLD: 0.3 % (ref 0–1.9)
BUN SERPL-MCNC: 29 MG/DL (ref 6–20)
BUN SERPL-MCNC: 32 MG/DL (ref 6–20)
CALCIUM SERPL-MCNC: 8.3 MG/DL (ref 8.7–10.5)
CALCIUM SERPL-MCNC: 8.3 MG/DL (ref 8.7–10.5)
CHLORIDE SERPL-SCNC: 103 MMOL/L (ref 95–110)
CHLORIDE SERPL-SCNC: 105 MMOL/L (ref 95–110)
CO2 SERPL-SCNC: 25 MMOL/L (ref 23–29)
CO2 SERPL-SCNC: 26 MMOL/L (ref 23–29)
CREAT SERPL-MCNC: 2.7 MG/DL (ref 0.5–1.4)
CREAT SERPL-MCNC: 2.9 MG/DL (ref 0.5–1.4)
DIFFERENTIAL METHOD: ABNORMAL
EOSINOPHIL # BLD AUTO: 0 K/UL (ref 0–0.5)
EOSINOPHIL NFR BLD: 0.4 % (ref 0–8)
ERYTHROCYTE [DISTWIDTH] IN BLOOD BY AUTOMATED COUNT: 16.5 % (ref 11.5–14.5)
EST. GFR  (AFRICAN AMERICAN): 29 ML/MIN/1.73 M^2
EST. GFR  (AFRICAN AMERICAN): 31 ML/MIN/1.73 M^2
EST. GFR  (NON AFRICAN AMERICAN): 25 ML/MIN/1.73 M^2
EST. GFR  (NON AFRICAN AMERICAN): 27 ML/MIN/1.73 M^2
GLUCOSE SERPL-MCNC: 121 MG/DL (ref 70–110)
GLUCOSE SERPL-MCNC: 97 MG/DL (ref 70–110)
HCT VFR BLD AUTO: 29 % (ref 40–54)
HGB BLD-MCNC: 9.6 G/DL (ref 14–18)
IMM GRANULOCYTES # BLD AUTO: 0.03 K/UL (ref 0–0.04)
IMM GRANULOCYTES NFR BLD AUTO: 0.4 % (ref 0–0.5)
LIPASE SERPL-CCNC: 341 U/L (ref 4–60)
LYMPHOCYTES # BLD AUTO: 1.4 K/UL (ref 1–4.8)
LYMPHOCYTES NFR BLD: 18.3 % (ref 18–48)
MCH RBC QN AUTO: 35.8 PG (ref 27–31)
MCHC RBC AUTO-ENTMCNC: 33.1 G/DL (ref 32–36)
MCV RBC AUTO: 108 FL (ref 82–98)
MONOCYTES # BLD AUTO: 1 K/UL (ref 0.3–1)
MONOCYTES NFR BLD: 13.8 % (ref 4–15)
NEUTROPHILS # BLD AUTO: 5 K/UL (ref 1.8–7.7)
NEUTROPHILS NFR BLD: 66.8 % (ref 38–73)
NRBC BLD-RTO: 0 /100 WBC
PLATELET # BLD AUTO: 60 K/UL (ref 150–350)
PMV BLD AUTO: 12.4 FL (ref 9.2–12.9)
POTASSIUM SERPL-SCNC: 3.5 MMOL/L (ref 3.5–5.1)
POTASSIUM SERPL-SCNC: 3.6 MMOL/L (ref 3.5–5.1)
RBC # BLD AUTO: 2.68 M/UL (ref 4.6–6.2)
SODIUM SERPL-SCNC: 137 MMOL/L (ref 136–145)
SODIUM SERPL-SCNC: 137 MMOL/L (ref 136–145)
WBC # BLD AUTO: 7.48 K/UL (ref 3.9–12.7)

## 2020-01-10 PROCEDURE — 25000003 PHARM REV CODE 250: Performed by: PHYSICIAN ASSISTANT

## 2020-01-10 PROCEDURE — 99233 PR SUBSEQUENT HOSPITAL CARE,LEVL III: ICD-10-PCS | Mod: ,,, | Performed by: INTERNAL MEDICINE

## 2020-01-10 PROCEDURE — C9113 INJ PANTOPRAZOLE SODIUM, VIA: HCPCS | Performed by: PHYSICIAN ASSISTANT

## 2020-01-10 PROCEDURE — 85397 CLOTTING FUNCT ACTIVITY: CPT

## 2020-01-10 PROCEDURE — 11000001 HC ACUTE MED/SURG PRIVATE ROOM

## 2020-01-10 PROCEDURE — 83010 ASSAY OF HAPTOGLOBIN QUANT: CPT

## 2020-01-10 PROCEDURE — 87045 FECES CULTURE AEROBIC BACT: CPT

## 2020-01-10 PROCEDURE — 80048 BASIC METABOLIC PNL TOTAL CA: CPT

## 2020-01-10 PROCEDURE — 83690 ASSAY OF LIPASE: CPT

## 2020-01-10 PROCEDURE — 99233 SBSQ HOSP IP/OBS HIGH 50: CPT | Mod: ,,, | Performed by: INTERNAL MEDICINE

## 2020-01-10 PROCEDURE — 36415 COLL VENOUS BLD VENIPUNCTURE: CPT

## 2020-01-10 PROCEDURE — 87046 STOOL CULTR AEROBIC BACT EA: CPT

## 2020-01-10 PROCEDURE — 63600175 PHARM REV CODE 636 W HCPCS: Performed by: PHYSICIAN ASSISTANT

## 2020-01-10 PROCEDURE — 25000003 PHARM REV CODE 250: Performed by: INTERNAL MEDICINE

## 2020-01-10 PROCEDURE — 63600175 PHARM REV CODE 636 W HCPCS: Performed by: INTERNAL MEDICINE

## 2020-01-10 PROCEDURE — S0030 INJECTION, METRONIDAZOLE: HCPCS | Performed by: PHYSICIAN ASSISTANT

## 2020-01-10 PROCEDURE — 87427 SHIGA-LIKE TOXIN AG IA: CPT

## 2020-01-10 PROCEDURE — 80048 BASIC METABOLIC PNL TOTAL CA: CPT | Mod: 91

## 2020-01-10 PROCEDURE — 85025 COMPLETE CBC W/AUTO DIFF WBC: CPT

## 2020-01-10 RX ORDER — SODIUM CHLORIDE 9 MG/ML
INJECTION, SOLUTION INTRAVENOUS CONTINUOUS
Status: DISCONTINUED | OUTPATIENT
Start: 2020-01-10 | End: 2020-01-11

## 2020-01-10 RX ADMIN — PANTOPRAZOLE SODIUM 40 MG: 40 INJECTION, POWDER, LYOPHILIZED, FOR SOLUTION INTRAVENOUS at 10:01

## 2020-01-10 RX ADMIN — ACETAMINOPHEN 650 MG: 325 TABLET ORAL at 08:01

## 2020-01-10 RX ADMIN — METRONIDAZOLE 500 MG: 500 INJECTION, SOLUTION INTRAVENOUS at 04:01

## 2020-01-10 RX ADMIN — DIPHENHYDRAMINE HYDROCHLORIDE 25 MG: 25 CAPSULE ORAL at 05:01

## 2020-01-10 RX ADMIN — DIPHENHYDRAMINE HYDROCHLORIDE 25 MG: 25 CAPSULE ORAL at 08:01

## 2020-01-10 RX ADMIN — PANTOPRAZOLE SODIUM 40 MG: 40 INJECTION, POWDER, LYOPHILIZED, FOR SOLUTION INTRAVENOUS at 08:01

## 2020-01-10 RX ADMIN — ACETAMINOPHEN 650 MG: 325 TABLET ORAL at 05:01

## 2020-01-10 RX ADMIN — SODIUM CHLORIDE: 0.9 INJECTION, SOLUTION INTRAVENOUS at 07:01

## 2020-01-10 RX ADMIN — CIPROFLOXACIN 400 MG: 2 INJECTION, SOLUTION INTRAVENOUS at 02:01

## 2020-01-10 RX ADMIN — PROPRANOLOL HYDROCHLORIDE 10 MG: 10 TABLET ORAL at 10:01

## 2020-01-10 RX ADMIN — PROPRANOLOL HYDROCHLORIDE 10 MG: 10 TABLET ORAL at 08:01

## 2020-01-10 RX ADMIN — PROPRANOLOL HYDROCHLORIDE 10 MG: 10 TABLET ORAL at 02:01

## 2020-01-10 RX ADMIN — METRONIDAZOLE 500 MG: 500 INJECTION, SOLUTION INTRAVENOUS at 10:01

## 2020-01-10 RX ADMIN — CIPROFLOXACIN 400 MG: 2 INJECTION, SOLUTION INTRAVENOUS at 03:01

## 2020-01-10 RX ADMIN — METRONIDAZOLE 500 MG: 500 INJECTION, SOLUTION INTRAVENOUS at 01:01

## 2020-01-10 NOTE — ASSESSMENT & PLAN NOTE
- chronic  - associated w/ alcoholic cirrhosis of the liver  - patient states he has been sober >1 year  - acute hep panel ordered    - last hep panel negative 5/19   - Tbili elevated, check direct bili     -see above

## 2020-01-10 NOTE — ASSESSMENT & PLAN NOTE
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 Reggie Wilcox at [facility]     Seen by Dr. RENE Guider: "- cipro/flagyl - could transition to PO since tolerating  - monitor serial H/H and for evidence of ongoing overt bleeding (low suspicion for variceal bleeding with low BUN)  - continue beta blocker and PPI  - no plans for endoscopy for now unless ongoing blood loss  - will arrange for clinic follow up to make sure diverticulitis has resolved - will need to obtain prior colonoscopy report- if not done recently, may need a follow up colonoscopy to exclude colon malignancy mimicking diverticulitis".  "

## 2020-01-10 NOTE — ASSESSMENT & PLAN NOTE
- Mr. Irvin Diaz is admitted to inpatient status   - he has had 2 episodes of dark red blood per rectum  - he had a colonoscopy showing diverticulosis approx. 1 year ago  - NPO  - GI Bleed pathways initiated   - H&H stable   - GI consulted   - monitor overnight   - PRN pain/nausea meds     -see above.

## 2020-01-10 NOTE — HOSPITAL COURSE
Given his creatinine is still 2.9 on 1/10/2020 and urine lytes consistent with pre-renal state, so restarted IV fluids.     Creatinine is slightly better at 2.6 on 1/11/2020.  Hemoglobin has been stable at 9.7.    Creatinine on 1/12/2020 was down to 2.2.  K+ ws 3.3, so will check magnesium (1.5) and replaced with KCL and Magnesium Sulfate.  Creatinine down to 2.0 on 1/13/2020.  Per Renal, ok to be discharged with follow up labs in 2 weeks.    Patient is overall improved with resolution of abdominal pain and rectal bleeding on 1/10/2020.   Diet advanced and tolerated well.  Lipase again elevated, but pain free and tolerating diet.  Will complete a 10 day course of Cipro/Flagyl for Diverticulitis on discharge.    See below.

## 2020-01-10 NOTE — ASSESSMENT & PLAN NOTE
- endorses reduced urination   - BUN &  Cr are elevated at 23 & 2.8 upon admission   - ENA studies & retroperitoneal US ordered   - s/p I fluids in ED   - monitor UOP and renal function     He is not actively on IV fluids.  Given his creatinine is still 2.9 and urine lytes consistent with pre-renal state, will restart IV fluids today.  Once creatinine improved, he can be discharged.

## 2020-01-10 NOTE — SUBJECTIVE & OBJECTIVE
Interval History: patient overall improved overnight.    Review of Systems   Constitutional: Negative for appetite change, chills, diaphoresis and fever.   Respiratory: Negative for cough, shortness of breath and wheezing.    Cardiovascular: Negative for chest pain and palpitations.   Gastrointestinal: Negative for abdominal pain, blood in stool, constipation, diarrhea, nausea and vomiting.   Genitourinary: Positive for decreased urine volume. Negative for difficulty urinating, dysuria, frequency, hematuria and urgency.   Musculoskeletal: Negative for back pain, neck pain and neck stiffness.   Skin: Negative for rash and wound.   Neurological: Negative for dizziness, syncope, weakness, light-headedness and headaches.   Psychiatric/Behavioral: Negative for confusion and decreased concentration.     Objective:     Vital Signs (Most Recent):  Temp: 98.6 °F (37 °C) (01/10/20 0717)  Pulse: 76 (01/10/20 1000)  Resp: 18 (01/10/20 0717)  BP: 133/85 (01/10/20 0717)  SpO2: 99 % (01/10/20 0717) Vital Signs (24h Range):  Temp:  [98.4 °F (36.9 °C)-99.3 °F (37.4 °C)] 98.6 °F (37 °C)  Pulse:  [67-90] 76  Resp:  [16-18] 18  SpO2:  [95 %-99 %] 99 %  BP: (124-136)/(72-85) 133/85     Weight: 83.2 kg (183 lb 6.8 oz)  Body mass index is 24.88 kg/m².    Intake/Output Summary (Last 24 hours) at 1/10/2020 1201  Last data filed at 1/10/2020 1004  Gross per 24 hour   Intake 1240 ml   Output 675 ml   Net 565 ml      Physical Exam   Constitutional: He is oriented to person, place, and time. Vital signs are normal. He appears well-developed and well-nourished.  Non-toxic appearance. He does not have a sickly appearance. He does not appear ill. No distress.   HENT:   Head: Normocephalic and atraumatic.   Eyes: Pupils are equal, round, and reactive to light. Conjunctivae and EOM are normal. No scleral icterus.   Neck: Normal range of motion. Neck supple. No JVD present. No tracheal deviation present.   Cardiovascular: Normal rate, regular rhythm,  normal heart sounds and intact distal pulses. Exam reveals no gallop and no friction rub.   No murmur heard.  Pulmonary/Chest: Effort normal and breath sounds normal. No stridor. No respiratory distress. He has no wheezes. He has no rales.   Abdominal: Soft. Bowel sounds are normal. He exhibits no distension and no mass. There is no tenderness.   Neurological: He is alert and oriented to person, place, and time.   Skin: Skin is warm and dry. He is not diaphoretic.   Psychiatric: He has a normal mood and affect. His behavior is normal. Judgment and thought content normal.   Nursing note and vitals reviewed.      Significant Labs: All pertinent labs within the past 24 hours have been reviewed.    Significant Imaging: I have reviewed and interpreted all pertinent imaging results/findings within the past 24 hours.

## 2020-01-10 NOTE — CONSULTS
Consult Note  Nephrology    Consult Requested By: Roberto Becker MD  Reason for Consult: ENA    SUBJECTIVE:     History of Present Illness:  Patient is a 44 y.o. male presents with 2 days of nausea, vomiting, and copious diarrhea.  Evaluation emergency room revealed patient to have acute kidney injury with creatinine of 2.8.  Radiologic studies showing evidence of early diverticulitis and patient admitted and started on IV antibiotics.  Was only given 1 L IV fluids.  This morning creatinine up to 2.9 and consulted for evaluation.    Case discussed with team.  Patient seen and examined.  Known to us from last year following episode of acute kidney injury from volume depletion.  Patient feeling better and has less diarrhea.  Eager to try to eat some solid food.  Urine remains dark jess.  Denies any NSAIDs.  Currently without chest pain, shortness of breath, nausea, vomiting, diarrhea, fever, chills.      Epic reviewed.      Past Medical History:   Diagnosis Date    Alcoholic cirrhosis of liver     Arthritis     Hypertension     Pulmonary embolism      Past Surgical History:   Procedure Laterality Date    COLON SURGERY      ESOPHAGOGASTRODUODENOSCOPY N/A 7/26/2019    Procedure: EGD (ESOPHAGOGASTRODUODENOSCOPY);  Surgeon: Brian Trivedi MD;  Location: [facility];  Service: Endoscopy;  Laterality: N/A;    HERNIA REPAIR       History reviewed. No pertinent family history.  Social History     Tobacco Use    Smoking status: Never Smoker   Substance Use Topics    Alcohol use: Not Currently     Comment: freq    Drug use: Yes     Types: Marijuana       Review of patient's allergies indicates:   Allergen Reactions    Morphine Itching        Review of Systems:  Constitutional: No fever or chills  Respiratory: No cough or shortness of breath  Cardiovascular: No chest pain or palpitations  Gastrointestinal: No nausea or vomiting  Neurological: No confusion or weakness    OBJECTIVE:     Vital Signs (Most  Recent)  Temp: 98.6 °F (37 °C) (01/10/20 0717)  Pulse: 73 (01/10/20 0717)  Resp: 18 (01/10/20 0717)  BP: 133/85 (01/10/20 0717)  SpO2: 99 % (01/10/20 0717)    Vital Signs Range (Last 24H):  Temp:  [98.4 °F (36.9 °C)-99.3 °F (37.4 °C)]   Pulse:  [67-90]   Resp:  [16-18]   BP: (124-136)/(72-85)   SpO2:  [95 %-99 %]       Intake/Output Summary (Last 24 hours) at 1/10/2020 0953  Last data filed at 1/10/2020 0718  Gross per 24 hour   Intake 1000 ml   Output 525 ml   Net 475 ml       Physical Exam:  General appearance: Well developed, well nourished  Eyes:  Conjunctivae/corneas clear. PERRL.  Lungs: Normal respiratory effort,   clear to auscultation bilaterally   Heart: Regular rate and rhythm, S1, S2 normal, no murmur, rub or terence.  Abdomen: Soft, semi-tender non-distended; bowel sounds normal; no masses,  no organomegaly  Extremities: No cyanosis or clubbing. No edema.    Skin: Skin color, texture, turgor normal. No rashes or lesions  Neurologic: Normal strength and tone. No focal numbness or weakness       Laboratory:  Recent Labs   Lab 01/10/20  0407   WBC 7.48   RBC 2.68*   HGB 9.6*   HCT 29.0*   PLT 60*   *   MCH 35.8*   MCHC 33.1     BMP:   Recent Labs   Lab 01/10/20  0407   *      K 3.6      CO2 25   BUN 32*   CREATININE 2.9*   CALCIUM 8.3*     Lab Results   Component Value Date    CALCIUM 8.3 (L) 01/10/2020    PHOS 3.1 07/26/2019     BNP  No results for input(s): BNP, BNPTRIAGEBLO in the last 168 hours.No results found for: URICACIDNo results found for: IRON, TIBC, FERRITIN, SATURATEDIRO  Lab Results   Component Value Date    CALCIUM 8.3 (L) 01/10/2020    PHOS 3.1 07/26/2019       Diagnostic Results:  CT Abdomen Pelvis  Without Contrast   Final Result      Stigmata of cirrhosis, including varices and splenomegaly.  Hepatic steatosis.      Colonic diverticulosis.  Prominent thickening of the wall of the decompressed descending colon.  Findings may represent early or resolved  diverticulitis.      Mild prostatomegaly.      AVN of the left hip.      Small hiatal hernia.         Electronically signed by: Britta Ma   Date:    01/09/2020   Time:    00:59          ASSESSMENT/PLAN:     1. Oliguric acute kidney injury on normal kidney function secondary to volume depletion and early ATN from nausea, vomiting, diarrhea and poor oral intake (N17.9, N17.0):  See HPI.  Denies NSAIDS.  Pt known to us from last year with ATN following volume depletion and hypotension after ETOH.  CT abd w/o obstruction.  Agree with aggressive IVF's as now.  Consider changing PPI to H2 blocker if able.  Will follow trends.  No need for RRT.  Will check for shiga-toxins-adamts to r/o STEC/HUS.  Renally dose meds, avoid nephrotoxins, and monitor I/O's closely.  2. N/VD early diverticulitis:  Defer to GI.  Renally dose antibx.    3. Anemia of GI loss:  Monitor H/H.   4. Thrombocytopenia from cirrhosis vs other:  Defer.       Thanks for consult  See above  Will follow along.

## 2020-01-10 NOTE — PROGRESS NOTES
Gastroenterology Progress Note    Reason for Consult: diverticulitis, cirrhosis    Subjective:  No issues overnight.  He is tolerating PO.  He is urinating.  Abdominal pain improved.  Small bowel movement without blood.    ROS:  Gen: no F/C  CV: no CP/palpitations  Resp: no SOB/wheezing    Physical Exam  /85 (BP Location: Right arm, Patient Position: Lying)   Pulse 73   Temp 98.6 °F (37 °C) (Oral)   Resp 18   Ht 6' (1.829 m)   Wt 83.2 kg (183 lb 6.8 oz)   SpO2 99%   BMI 24.88 kg/m²   General: Awake, alert male in no apparent distress  HEENT: NC/AT, PERRL, EOMI, MMM  CV: RRR, without murmur, gallop, or rubs  Pulm: CTAB, no wheezing, rales, or rhonchi  GI: soft, mild TTP on left side, non-distended, +BS  MSK: no edema and normal ROM  Neuro: A&Ox3, moves all extremities equally, sensation grossly intact  Skin: no rashes or lesions  Psych: normal mood and affect    Medications/Infusions:  Current Facility-Administered Medications   Medication Dose Route Frequency Provider Last Rate Last Dose    0.9%  NaCl infusion   Intravenous Continuous Roberto Becker  mL/hr at 01/10/20 0718      acetaminophen tablet 650 mg  650 mg Oral Q6H PRN Roberto Becker MD   650 mg at 01/10/20 0537    ciprofloxacin (CIPRO)400mg/200ml D5W IVPB 400 mg  400 mg Intravenous Q12H Chio Mendoza PA-C 200 mL/hr at 01/10/20 0300 400 mg at 01/10/20 0300    diphenhydrAMINE capsule 25 mg  25 mg Oral Q6H PRN Roberto Becker MD   25 mg at 01/10/20 0537    metronidazole IVPB 500 mg  500 mg Intravenous Q8H Chio Mendoza PA-C 100 mL/hr at 01/10/20 0100 500 mg at 01/10/20 0100    ondansetron injection 4 mg  4 mg Intravenous Q8H PRN Chio Mendoza PA-C        pantoprazole injection 40 mg  40 mg Intravenous BID Chio Mendoza PA-C   40 mg at 01/09/20 2151    promethazine (PHENERGAN) 12.5 mg in dextrose 5 % 50 mL IVPB  12.5 mg Intravenous Q6H PRN Chio Mendoza PA-C 150 mL/hr at 01/09/20 1052 12.5 mg  at 01/09/20 1052    propranolol tablet 10 mg  10 mg Oral TID Chio Mendoza PA-C   10 mg at 01/09/20 2151    sodium chloride 0.9% flush 10 mL  10 mL Intravenous PRN Angela Dinero MD           Intake and Output:    Intake/Output Summary (Last 24 hours) at 1/10/2020 1004  Last data filed at 1/10/2020 0718  Gross per 24 hour   Intake 1000 ml   Output 525 ml   Net 475 ml       Labs:  Lab Results   Component Value Date/Time    WBC 7.48 01/10/2020 04:07 AM    HGB 9.6 (L) 01/10/2020 04:07 AM    HCT 29.0 (L) 01/10/2020 04:07 AM    PLT 60 (L) 01/10/2020 04:07 AM     (H) 01/10/2020 04:07 AM     01/10/2020 04:07 AM     09/02/2018 05:08 AM    K 3.6 01/10/2020 04:07 AM     01/10/2020 04:07 AM     09/02/2018 05:08 AM    CO2 25 01/10/2020 04:07 AM    BUN 32 (H) 01/10/2020 04:07 AM    CREATININE 2.9 (H) 01/10/2020 04:07 AM    CREATININE 0.91 09/02/2018 05:08 AM     (H) 01/10/2020 04:07 AM    GLU 99 09/02/2018 05:08 AM    CALCIUM 8.3 (L) 01/10/2020 04:07 AM    MG 1.6 07/27/2019 05:16 AM    PHOS 3.1 07/26/2019 03:40 AM    INR 1.2 09/23/2019 08:18 AM    APTT 32.6 (H) 09/23/2019 08:18 AM   ]  Lab Results   Component Value Date/Time    PROT 10.6 (H) 01/08/2020 11:43 PM    ALBUMIN 3.6 01/08/2020 11:43 PM    BILITOT 7.4 (H) 01/08/2020 11:43 PM    BILIDIR 3.3 (H) 01/09/2020 03:31 AM     (H) 01/08/2020 11:43 PM    ALT 61 (H) 01/08/2020 11:43 PM    ALKPHOS 118 01/08/2020 11:43 PM   ]    Imaging and Procedures:  Labs and imaging results were reviewed.  CT A/P 1/9/20:  Stigmata of cirrhosis, including varices and splenomegaly.  Hepatic steatosis.  Colonic diverticulosis.  Prominent thickening of the wall of the decompressed descending colon.  Findings may represent early or resolved diverticulitis.  Mild prostatomegaly.  AVN of the left hip.  Small hiatal hernia.    Assessment:  Irvin Diaz is a 44 y.o. male with a history of HTN, prior PE, arthritis and ETOH cirrhosis complicated by  esophageal varices who presented with LUQ pain as well as nausea, vomiting and diarrhea. Suspect diverticulitis based on CT findings with limited rectal bleeding - brown stool noted on ER rectal exam.     Plan:  - cipro/flagyl - could transition to PO since tolerating  - monitor serial H/H and for evidence of ongoing overt bleeding (low suspicion for variceal bleeding with low BUN)  - continue beta blocker and PPI  - no plans for endoscopy for now unless ongoing blood loss  - will arrange for clinic follow up to make sure diverticulitis has resolved - will need to obtain prior colonoscopy report- if not done recently, may need a follow up colonoscopy to exclude colon malignancy mimicking diverticulitis    Cyndee Bajwa MD

## 2020-01-10 NOTE — ASSESSMENT & PLAN NOTE
- s/p banding x 2   - follows w/ GI @ [facility]   - last EGD 5/19 showed these were stable  - last banding 10/18   - continue PPI via IV while NPO  - continue propranolol    -see above

## 2020-01-10 NOTE — PLAN OF CARE
Assessed pt for spiritual distress - none reported but pt seemed somewhat reluctant to share.  Follow-up spiritual care was offered upon request.  See pt for follow-up care as time allows.

## 2020-01-11 PROBLEM — R74.8 ELEVATED LIPASE: Status: ACTIVE | Noted: 2020-01-11

## 2020-01-11 LAB
ALBUMIN SERPL BCP-MCNC: 2.8 G/DL (ref 3.5–5.2)
ALP SERPL-CCNC: 91 U/L (ref 55–135)
ALT SERPL W/O P-5'-P-CCNC: 41 U/L (ref 10–44)
ANION GAP SERPL CALC-SCNC: 9 MMOL/L (ref 8–16)
AST SERPL-CCNC: 107 U/L (ref 10–40)
BASOPHILS # BLD AUTO: 0.04 K/UL (ref 0–0.2)
BASOPHILS NFR BLD: 0.6 % (ref 0–1.9)
BILIRUB SERPL-MCNC: 4.9 MG/DL (ref 0.1–1)
BUN SERPL-MCNC: 26 MG/DL (ref 6–20)
CALCIUM SERPL-MCNC: 8.2 MG/DL (ref 8.7–10.5)
CHLORIDE SERPL-SCNC: 106 MMOL/L (ref 95–110)
CO2 SERPL-SCNC: 22 MMOL/L (ref 23–29)
CREAT SERPL-MCNC: 2.6 MG/DL (ref 0.5–1.4)
DIFFERENTIAL METHOD: ABNORMAL
EOSINOPHIL # BLD AUTO: 0 K/UL (ref 0–0.5)
EOSINOPHIL NFR BLD: 0.6 % (ref 0–8)
ERYTHROCYTE [DISTWIDTH] IN BLOOD BY AUTOMATED COUNT: 16.6 % (ref 11.5–14.5)
EST. GFR  (AFRICAN AMERICAN): 33 ML/MIN/1.73 M^2
EST. GFR  (NON AFRICAN AMERICAN): 29 ML/MIN/1.73 M^2
FERRITIN SERPL-MCNC: 260 NG/ML (ref 20–300)
GLUCOSE SERPL-MCNC: 104 MG/DL (ref 70–110)
HAPTOGLOB SERPL-MCNC: 52 MG/DL (ref 30–250)
HCT VFR BLD AUTO: 28.9 % (ref 40–54)
HGB BLD-MCNC: 9.7 G/DL (ref 14–18)
IMM GRANULOCYTES # BLD AUTO: 0.02 K/UL (ref 0–0.04)
IMM GRANULOCYTES NFR BLD AUTO: 0.3 % (ref 0–0.5)
IRON SERPL-MCNC: 72 UG/DL (ref 45–160)
LIPASE SERPL-CCNC: 846 U/L (ref 4–60)
LYMPHOCYTES # BLD AUTO: 1.5 K/UL (ref 1–4.8)
LYMPHOCYTES NFR BLD: 21.7 % (ref 18–48)
MCH RBC QN AUTO: 35.9 PG (ref 27–31)
MCHC RBC AUTO-ENTMCNC: 33.6 G/DL (ref 32–36)
MCV RBC AUTO: 107 FL (ref 82–98)
MONOCYTES # BLD AUTO: 0.9 K/UL (ref 0.3–1)
MONOCYTES NFR BLD: 13.5 % (ref 4–15)
NEUTROPHILS # BLD AUTO: 4.4 K/UL (ref 1.8–7.7)
NEUTROPHILS NFR BLD: 63.3 % (ref 38–73)
NRBC BLD-RTO: 0 /100 WBC
PLATELET # BLD AUTO: 57 K/UL (ref 150–350)
PMV BLD AUTO: 11.7 FL (ref 9.2–12.9)
POTASSIUM SERPL-SCNC: 3.5 MMOL/L (ref 3.5–5.1)
PROT SERPL-MCNC: 7.8 G/DL (ref 6–8.4)
RBC # BLD AUTO: 2.7 M/UL (ref 4.6–6.2)
SATURATED IRON: 30 % (ref 20–50)
SODIUM SERPL-SCNC: 137 MMOL/L (ref 136–145)
TOTAL IRON BINDING CAPACITY: 237 UG/DL (ref 250–450)
TRANSFERRIN SERPL-MCNC: 160 MG/DL (ref 200–375)
WBC # BLD AUTO: 6.88 K/UL (ref 3.9–12.7)

## 2020-01-11 PROCEDURE — 11000001 HC ACUTE MED/SURG PRIVATE ROOM

## 2020-01-11 PROCEDURE — 63600175 PHARM REV CODE 636 W HCPCS: Performed by: INTERNAL MEDICINE

## 2020-01-11 PROCEDURE — C9113 INJ PANTOPRAZOLE SODIUM, VIA: HCPCS | Performed by: PHYSICIAN ASSISTANT

## 2020-01-11 PROCEDURE — 85025 COMPLETE CBC W/AUTO DIFF WBC: CPT

## 2020-01-11 PROCEDURE — 25000003 PHARM REV CODE 250: Performed by: PHYSICIAN ASSISTANT

## 2020-01-11 PROCEDURE — 83690 ASSAY OF LIPASE: CPT

## 2020-01-11 PROCEDURE — 25000003 PHARM REV CODE 250: Performed by: INTERNAL MEDICINE

## 2020-01-11 PROCEDURE — 80053 COMPREHEN METABOLIC PANEL: CPT

## 2020-01-11 PROCEDURE — 82728 ASSAY OF FERRITIN: CPT

## 2020-01-11 PROCEDURE — 36415 COLL VENOUS BLD VENIPUNCTURE: CPT

## 2020-01-11 PROCEDURE — 63600175 PHARM REV CODE 636 W HCPCS: Performed by: PHYSICIAN ASSISTANT

## 2020-01-11 PROCEDURE — 99233 SBSQ HOSP IP/OBS HIGH 50: CPT | Mod: ,,, | Performed by: INTERNAL MEDICINE

## 2020-01-11 PROCEDURE — S0030 INJECTION, METRONIDAZOLE: HCPCS | Performed by: PHYSICIAN ASSISTANT

## 2020-01-11 PROCEDURE — 99233 PR SUBSEQUENT HOSPITAL CARE,LEVL III: ICD-10-PCS | Mod: ,,, | Performed by: INTERNAL MEDICINE

## 2020-01-11 PROCEDURE — 83540 ASSAY OF IRON: CPT

## 2020-01-11 RX ORDER — HEPARIN SODIUM 5000 [USP'U]/ML
5000 INJECTION, SOLUTION INTRAVENOUS; SUBCUTANEOUS EVERY 12 HOURS
Status: DISCONTINUED | OUTPATIENT
Start: 2020-01-11 | End: 2020-01-11

## 2020-01-11 RX ORDER — TRAZODONE HYDROCHLORIDE 50 MG/1
50 TABLET ORAL NIGHTLY
Status: DISCONTINUED | OUTPATIENT
Start: 2020-01-11 | End: 2020-01-13

## 2020-01-11 RX ORDER — SODIUM CHLORIDE, SODIUM LACTATE, POTASSIUM CHLORIDE, CALCIUM CHLORIDE 600; 310; 30; 20 MG/100ML; MG/100ML; MG/100ML; MG/100ML
INJECTION, SOLUTION INTRAVENOUS CONTINUOUS
Status: DISCONTINUED | OUTPATIENT
Start: 2020-01-11 | End: 2020-01-13 | Stop reason: HOSPADM

## 2020-01-11 RX ADMIN — CIPROFLOXACIN 400 MG: 2 INJECTION, SOLUTION INTRAVENOUS at 03:01

## 2020-01-11 RX ADMIN — PANTOPRAZOLE SODIUM 40 MG: 40 INJECTION, POWDER, LYOPHILIZED, FOR SOLUTION INTRAVENOUS at 08:01

## 2020-01-11 RX ADMIN — PROPRANOLOL HYDROCHLORIDE 10 MG: 10 TABLET ORAL at 02:01

## 2020-01-11 RX ADMIN — PROPRANOLOL HYDROCHLORIDE 10 MG: 10 TABLET ORAL at 08:01

## 2020-01-11 RX ADMIN — TRAZODONE HYDROCHLORIDE 50 MG: 50 TABLET ORAL at 08:01

## 2020-01-11 RX ADMIN — METRONIDAZOLE 500 MG: 500 INJECTION, SOLUTION INTRAVENOUS at 08:01

## 2020-01-11 RX ADMIN — CIPROFLOXACIN 400 MG: 2 INJECTION, SOLUTION INTRAVENOUS at 02:01

## 2020-01-11 RX ADMIN — DIPHENHYDRAMINE HYDROCHLORIDE 25 MG: 25 CAPSULE ORAL at 07:01

## 2020-01-11 RX ADMIN — METRONIDAZOLE 500 MG: 500 INJECTION, SOLUTION INTRAVENOUS at 05:01

## 2020-01-11 RX ADMIN — SODIUM CHLORIDE, SODIUM LACTATE, POTASSIUM CHLORIDE, AND CALCIUM CHLORIDE: .6; .31; .03; .02 INJECTION, SOLUTION INTRAVENOUS at 12:01

## 2020-01-11 RX ADMIN — METRONIDAZOLE 500 MG: 500 INJECTION, SOLUTION INTRAVENOUS at 02:01

## 2020-01-11 NOTE — PLAN OF CARE
AAOX4. VSS.Pt free of trauma, falls, injury and skin breakdown. Pt denies pain at this time.NaCl infusing @200ml/hr.Pt has been eating and voiding adequately throughout shift. Cardiac monitoring maintained.Pt ambulates and repositions self independently. Purposeful hourly rounding. Pt has call light in reach, side rails up X2, bed in low position, SCDs and nonskid socks on. Pt lying in bed in no distress. Pt seen by Nephrology.

## 2020-01-11 NOTE — ASSESSMENT & PLAN NOTE
- no longer anticoagulated on admission.  -Took coumadin in the past.  Has been off and note in 7/2019 at [facility] confirmed this.  Will give SQ heparin given AKD now for DVT prophylaxis.

## 2020-01-11 NOTE — ASSESSMENT & PLAN NOTE
- endorses reduced urination   - BUN &  Cr are elevated at 23 & 2.8 upon admission   - ENA studies & retroperitoneal US ordered   - s/p I fluids in ED   - monitor UOP and renal function     He was not actively on IV fluids on 1/10/2020.  Given his creatinine is still 2.9 and urine lytes consistent with pre-renal state, we restarted IV fluids 1/10/2020.  Creatinine is 2.6 on 1/11/2020.

## 2020-01-11 NOTE — ASSESSMENT & PLAN NOTE
"- Mr. Irvin Diaz is admitted to inpatient status   - he has had 2 episodes of dark red blood per rectum prior to admission  - he had a colonoscopy showing diverticulosis approx. 1 year ago  - NPO initially.  - Hgb dropped from 11.4 on admission to 9.7 (1/11/2020) - has been stable since initial drop.  Hgb was 10.5 in 9/2019.    - GI consulted - "Irvin Diaz is a 44 y.o. male with a history of HTN, prior PE, arthritis and ETOH cirrhosis complicated by esophageal varices who presented with LUQ pain as well as nausea, vomiting and diarrhea. Suspect diverticulitis based on CT findings with limited rectal bleeding - brown stool noted on ER rectal exam.  Plan:  - cipro/flagyl - could transition to PO since tolerating  - monitor serial H/H and for evidence of ongoing overt bleeding (low suspicion for variceal bleeding with low BUN)  - continue beta blocker and PPI  - no plans for endoscopy for now unless ongoing blood loss  - will arrange for clinic follow up to make sure diverticulitis has resolved - will need to obtain prior colonoscopy report- if not done recently, may need a follow up colonoscopy to exclude colon malignancy mimicking diverticulitis".               "

## 2020-01-11 NOTE — ASSESSMENT & PLAN NOTE
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 Reggie Wilcox at [facility]     Seen by Dr. RENE Guider: "- cipro/flagyl - could transition to PO since tolerating  - monitor serial H/H and for evidence of ongoing overt bleeding (low suspicion for variceal bleeding with low BUN)  - continue beta blocker and PPI  - no plans for endoscopy for now unless ongoing blood loss  - will arrange for clinic follow up to make sure diverticulitis has resolved - will need to obtain prior colonoscopy report- if not done recently, may need a follow up colonoscopy to exclude colon malignancy mimicking diverticulitis".  "

## 2020-01-11 NOTE — PROGRESS NOTES
[facility]  Hospital Medicine  Progress Note    Patient Name: Irvin Diaz  MRN: [id number]  Patient Class: IP- Inpatient   Admission Date: 1/8/2020  Length of Stay: 2 days  Attending Physician: Roberto Becker MD  Primary Care Provider: [facility]        Subjective:     Principal Problem:Rectal bleeding        HPI:  Mr. Irvin Diaz is a 44 y.o. male, with PMH of UGIB, alcoholic cirrhosis, esophageal varicies (s/p banding x 2, on propranolol), previously resected benign colonic lesion, diverticulosis per colonoscopy 1 year ago, who presented to [facility] ED on 1/8/20 with left sided abdominal pain, frequent stooling and passage of dark red blood twice just PTA. He stated over the past few days he has had increased frequency of passing stool, and decreased urination. He has been unable to tolerate PO, and has been nauseous despite PO zofran. He states he was on blood thinners in the past 2/2 PE, but has been off x 1 year. He was evaluated in the ED with imaging c/w diverticulitis and thickening of the left colonic wall. He was admitted to inpatient status.     Overview/Hospital Course:  Patient is overall improved with resolution of abdominal pain and rectal bleeding on 1/10/2020.   Given his creatinine is still 2.9 on 1/10/2020 and urine lytes consistent with pre-renal state, so restarted IV fluids.       Creatinine is slightly better at 2.6 on 1/11/2020.  Hemoglobin has been stable at 9.7.  Lipase again elevated, but pain free and tolerating diet.        Interval History: Patient continues to feel better since admission.  No abdominal pain, N/V/D.    Review of Systems   Constitutional: Negative for appetite change, chills, diaphoresis and fever.   Respiratory: Negative for cough, shortness of breath and wheezing.    Cardiovascular: Negative for chest pain and palpitations.   Gastrointestinal: Negative for abdominal pain, blood in stool, constipation, diarrhea,  nausea and vomiting.   Genitourinary: Negative for decreased urine volume, difficulty urinating, dysuria, frequency, hematuria and urgency.   Musculoskeletal: Negative for back pain, neck pain and neck stiffness.   Skin: Negative for rash and wound.   Neurological: Negative for dizziness, syncope, weakness, light-headedness and headaches.   Psychiatric/Behavioral: Negative for confusion and decreased concentration.     Objective:     Vital Signs (Most Recent):  Temp: 98.4 °F (36.9 °C) (01/11/20 1120)  Pulse: 76 (01/11/20 1120)  Resp: 18 (01/11/20 1120)  BP: (!) 161/93 (01/11/20 1120)  SpO2: 98 % (01/11/20 1120) Vital Signs (24h Range):  Temp:  [98.4 °F (36.9 °C)-99.3 °F (37.4 °C)] 98.4 °F (36.9 °C)  Pulse:  [73-93] 76  Resp:  [18-20] 18  SpO2:  [96 %-99 %] 98 %  BP: (127-161)/(75-94) 161/93     Weight: 83.2 kg (183 lb 6.8 oz)  Body mass index is 24.88 kg/m².    Intake/Output Summary (Last 24 hours) at 1/11/2020 1147  Last data filed at 1/11/2020 0600  Gross per 24 hour   Intake 1560 ml   Output 1125 ml   Net 435 ml      Physical Exam   Constitutional: He is oriented to person, place, and time. Vital signs are normal. He appears well-developed and well-nourished.  Non-toxic appearance. He does not have a sickly appearance. He does not appear ill. No distress.   HENT:   Head: Normocephalic and atraumatic.   Eyes: Pupils are equal, round, and reactive to light. Conjunctivae and EOM are normal. No scleral icterus.   Neck: Normal range of motion. Neck supple. No JVD present. No tracheal deviation present.   Cardiovascular: Normal rate, regular rhythm, normal heart sounds and intact distal pulses. Exam reveals no gallop and no friction rub.   No murmur heard.  Pulmonary/Chest: Effort normal and breath sounds normal. No stridor. No respiratory distress. He has no wheezes. He has no rales.   Abdominal: Soft. Bowel sounds are normal. He exhibits no distension and no mass. There is no tenderness.   Neurological: He is alert  "and oriented to person, place, and time.   Skin: Skin is warm and dry. He is not diaphoretic.   Psychiatric: He has a normal mood and affect. His behavior is normal. Judgment and thought content normal.   Nursing note and vitals reviewed.      Significant Labs: All pertinent labs within the past 24 hours have been reviewed.    Significant Imaging: I have reviewed all pertinent imaging results/findings within the past 24 hours.  I have reviewed and interpreted all pertinent imaging results/findings within the past 24 hours.      Assessment/Plan:      * Rectal bleeding  - Mr. Irvin Diaz is admitted to inpatient status   - he has had 2 episodes of dark red blood per rectum prior to admission  - he had a colonoscopy showing diverticulosis approx. 1 year ago  - NPO initially.  - Hgb dropped from 11.4 on admission to 9.7 (1/11/2020) - has been stable since initial drop.  Hgb was 10.5 in 9/2019.    - GI consulted - "Irvin Diaz is a 44 y.o. male with a history of HTN, prior PE, arthritis and ETOH cirrhosis complicated by esophageal varices who presented with LUQ pain as well as nausea, vomiting and diarrhea. Suspect diverticulitis based on CT findings with limited rectal bleeding - brown stool noted on ER rectal exam.  Plan:  - cipro/flagyl - could transition to PO since tolerating  - monitor serial H/H and for evidence of ongoing overt bleeding (low suspicion for variceal bleeding with low BUN)  - continue beta blocker and PPI  - no plans for endoscopy for now unless ongoing blood loss  - will arrange for clinic follow up to make sure diverticulitis has resolved - will need to obtain prior colonoscopy report- if not done recently, may need a follow up colonoscopy to exclude colon malignancy mimicking diverticulitis".                 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 Reggie Wilcox at [facility]     Seen by GI, Dr. Burkr: "- cipro/flagyl - could transition to PO since tolerating  - monitor serial H/H and for evidence of ongoing overt bleeding (low suspicion for variceal bleeding with low BUN)  - continue beta blocker and PPI  - no plans for endoscopy for now unless ongoing blood loss  - will arrange for clinic follow up to make sure diverticulitis has resolved - will need to obtain prior colonoscopy report- if not done recently, may need a follow up colonoscopy to exclude colon malignancy mimicking diverticulitis".    ENA (acute kidney injury)  - endorses reduced urination   - BUN &  Cr are elevated at 23 & 2.8 upon admission   - ENA studies & retroperitoneal US ordered   - s/p I fluids in ED   - monitor UOP and renal function     He was not actively on IV fluids on 1/10/2020.  Given his creatinine is still 2.9 and urine lytes consistent with pre-renal state, we restarted IV fluids 1/10/2020.  Creatinine is 2.6 on 1/11/2020.      Elevated lipase  Was 132 on admission and increased to 341 on 1/10/2020.  Abdominal pain resolved and tolerating diet well.  Had Acute pancreatitis in 8/2019.  His level was higher at 846 on 1/11/2020.  Given no pain and no pancreas changes on CT, will not alter treatment.  Discussed with Dr. Bajwa and agrees with plan.      Elevated LFTs  - chronic  - associated w/ alcoholic cirrhosis of the liver  - patient states he has been sober >1 year  - acute hep panel ordered    - last hep panel negative 5/19   - Tbili down to 4.9 on 1/11/2020 from 7.4 on admission.  AST/ALT improved.  Follow.  -see above    Benign essential HTN  - normotensive at present   - Continue propranolol   - monitor    Esophageal varices without bleeding  - s/p banding x 2   - follows w/ GI @ [facility]   - last EGD 5/19 showed these were stable  - last banding 10/18   - continue PPI via IV while NPO  - continue propranolol    -see above      History of pulmonary " embolus (PE)  - no longer anticoagulated on admission.  -Took coumadin in the past.  Has been off and note in 7/2019 at [facility] confirmed this.  Will give SQ heparin given AKD now for DVT prophylaxis.      Thrombocytopenia  - stable w/ prior measurements   - suspect 2/2 cirrhosis   - monitor        VTE Risk Mitigation (From admission, onward)         Ordered     heparin (porcine) injection 5,000 Units  Every 12 hours      01/11/20 1211     IP VTE LOW RISK PATIENT  Once      01/09/20 0246     Place sequential compression device  Until discontinued      01/09/20 0246     Reason for No Pharmacological VTE Prophylaxis  Once     Question:  Reasons:  Answer:  Active Bleeding    01/09/20 0246                      Roberto Becker MD  Department of Hospital Medicine   [facility]

## 2020-01-11 NOTE — ASSESSMENT & PLAN NOTE
- s/p banding x 2   - follows w/ GI @ [facility]   - last EGD 5/19 showed these were stable  - last banding 10/18   - continue PPI via IV while NPO  - continue propranolol    -see above

## 2020-01-11 NOTE — ASSESSMENT & PLAN NOTE
- chronic  - associated w/ alcoholic cirrhosis of the liver  - patient states he has been sober >1 year  - acute hep panel ordered    - last hep panel negative 5/19   - Tbili down to 4.9 on 1/11/2020 from 7.4 on admission.  AST/ALT improved.  Follow.  -see above

## 2020-01-11 NOTE — CONSULTS
Progress Note  Nephrology    Consult Requested By: Roberto Becker MD  Reason for Consult: ENA    SUBJECTIVE:     History of Present Illness:  Patient is a 44 y.o. male presents with 2 days of nausea, vomiting, and copious diarrhea.  Evaluation emergency room revealed patient to have acute kidney injury with creatinine of 2.8.  Radiologic studies showing evidence of early diverticulitis and patient admitted and started on IV antibiotics.  Was only given 1 L IV fluids.  This morning creatinine up to 2.9 and consulted for evaluation.    Case discussed with team.  Patient seen and examined.  Known to us from last year following episode of acute kidney injury from volume depletion.  Patient feeling better and has less diarrhea.  Eager to try to eat some solid food.  Urine remains dark jess.  Denies any NSAIDs.  Currently without chest pain, shortness of breath, nausea, vomiting, diarrhea, fever, chills.      Epic reviewed.    Interval History:    Pt feeling much better today. Has only had 2 semi-formed stolls today thus far. No vomiting and is eating and drinking. No LUTS.    Past Medical History:   Diagnosis Date    Alcoholic cirrhosis of liver     Arthritis     Hypertension     Pulmonary embolism      Past Surgical History:   Procedure Laterality Date    COLON SURGERY      ESOPHAGOGASTRODUODENOSCOPY N/A 7/26/2019    Procedure: EGD (ESOPHAGOGASTRODUODENOSCOPY);  Surgeon: Brian Trivedi MD;  Location: [facility];  Service: Endoscopy;  Laterality: N/A;    HERNIA REPAIR       History reviewed. No pertinent family history.  Social History     Tobacco Use    Smoking status: Never Smoker   Substance Use Topics    Alcohol use: Not Currently     Comment: freq    Drug use: Yes     Types: Marijuana       Review of patient's allergies indicates:   Allergen Reactions    Morphine Itching        Review of Systems:  Constitutional: No fever or chills  Respiratory: No cough or shortness of breath  Cardiovascular: No  chest pain or palpitations  Gastrointestinal: No nausea or vomiting  Neurological: No confusion or weakness    OBJECTIVE:     Vital Signs (Most Recent)  Temp: 98.4 °F (36.9 °C) (01/11/20 1120)  Pulse: 76 (01/11/20 1120)  Resp: 18 (01/11/20 1120)  BP: (!) 161/93 (01/11/20 1120)  SpO2: 98 % (01/11/20 1120)    Vital Signs Range (Last 24H):  Temp:  [98.4 °F (36.9 °C)-99.3 °F (37.4 °C)]   Pulse:  [73-93]   Resp:  [18-20]   BP: (127-161)/(75-94)   SpO2:  [96 %-99 %]       Intake/Output Summary (Last 24 hours) at 1/11/2020 1204  Last data filed at 1/11/2020 0600  Gross per 24 hour   Intake 1560 ml   Output 1125 ml   Net 435 ml       Physical Exam:  General appearance: Well developed, well nourished  Eyes:  Conjunctivae/corneas clear. PERRL.  Lungs: Normal respiratory effort,   clear to auscultation bilaterally   Heart: Regular rate and rhythm, S1, S2 normal, no murmur, rub or terence.  Abdomen: Soft, semi-tender non-distended; bowel sounds normal; no masses,  no organomegaly  Extremities: No cyanosis or clubbing. No edema.    Skin: Skin color, texture, turgor normal. No rashes or lesions  Neurologic: Normal strength and tone. No focal numbness or weakness       Laboratory:  Recent Labs   Lab 01/11/20  0351   WBC 6.88   RBC 2.70*   HGB 9.7*   HCT 28.9*   PLT 57*   *   MCH 35.9*   MCHC 33.6     BMP:   Recent Labs   Lab 01/11/20  0351         K 3.5      CO2 22*   BUN 26*   CREATININE 2.6*   CALCIUM 8.2*     Lab Results   Component Value Date    CALCIUM 8.2 (L) 01/11/2020    PHOS 3.1 07/26/2019     BNP  No results for input(s): BNP, BNPTRIAGEBLO in the last 168 hours.No results found for: URICACID  Lab Results   Component Value Date    IRON 72 01/11/2020    TIBC 237 (L) 01/11/2020    FERRITIN 260 01/11/2020     Lab Results   Component Value Date    CALCIUM 8.2 (L) 01/11/2020    PHOS 3.1 07/26/2019       Diagnostic Results:  CT Abdomen Pelvis  Without Contrast   Final Result      Stigmata of cirrhosis,  including varices and splenomegaly.  Hepatic steatosis.      Colonic diverticulosis.  Prominent thickening of the wall of the decompressed descending colon.  Findings may represent early or resolved diverticulitis.      Mild prostatomegaly.      AVN of the left hip.      Small hiatal hernia.         Electronically signed by: Britta Ma   Date:    01/09/2020   Time:    00:59          ASSESSMENT/PLAN:     1. Very Slowly resolving Non-Oliguric acute kidney injury on normal kidney function secondary to volume depletion and early ATN from nausea, vomiting, diarrhea and poor oral intake (N17.9, N17.0):  See HPI.  Denies NSAIDS.  Pt known to us from last year with ATN following volume depletion and hypotension after ETOH.  CT abd w/o obstruction.  Agree with aggressive IVF's as now.  Consider changing PPI to H2 blocker if able.  Will follow trends.  No need for RRT.  Haptoglobin normal, LDH ? Not done, but Hgb and platelets fairly stable. Stool culture pending. Change IVFs to LR.  Renally dose meds, avoid nephrotoxins, and monitor I/O's closely.  2. N/VD early diverticulitis:  Defer to GI.  Renally dose antibx.    3. Anemia of GI loss:  Monitor H/H. Fairly stable over last few days  4. Thrombocytopenia from cirrhosis vs other:  Very mild trend down.Defer.       See above  Will follow along.

## 2020-01-11 NOTE — SUBJECTIVE & OBJECTIVE
Interval History: Patient continues to feel better since admission.  No abdominal pain, N/V/D.    Review of Systems   Constitutional: Negative for appetite change, chills, diaphoresis and fever.   Respiratory: Negative for cough, shortness of breath and wheezing.    Cardiovascular: Negative for chest pain and palpitations.   Gastrointestinal: Negative for abdominal pain, blood in stool, constipation, diarrhea, nausea and vomiting.   Genitourinary: Negative for decreased urine volume, difficulty urinating, dysuria, frequency, hematuria and urgency.   Musculoskeletal: Negative for back pain, neck pain and neck stiffness.   Skin: Negative for rash and wound.   Neurological: Negative for dizziness, syncope, weakness, light-headedness and headaches.   Psychiatric/Behavioral: Negative for confusion and decreased concentration.     Objective:     Vital Signs (Most Recent):  Temp: 98.4 °F (36.9 °C) (01/11/20 1120)  Pulse: 76 (01/11/20 1120)  Resp: 18 (01/11/20 1120)  BP: (!) 161/93 (01/11/20 1120)  SpO2: 98 % (01/11/20 1120) Vital Signs (24h Range):  Temp:  [98.4 °F (36.9 °C)-99.3 °F (37.4 °C)] 98.4 °F (36.9 °C)  Pulse:  [73-93] 76  Resp:  [18-20] 18  SpO2:  [96 %-99 %] 98 %  BP: (127-161)/(75-94) 161/93     Weight: 83.2 kg (183 lb 6.8 oz)  Body mass index is 24.88 kg/m².    Intake/Output Summary (Last 24 hours) at 1/11/2020 1147  Last data filed at 1/11/2020 0600  Gross per 24 hour   Intake 1560 ml   Output 1125 ml   Net 435 ml      Physical Exam   Constitutional: He is oriented to person, place, and time. Vital signs are normal. He appears well-developed and well-nourished.  Non-toxic appearance. He does not have a sickly appearance. He does not appear ill. No distress.   HENT:   Head: Normocephalic and atraumatic.   Eyes: Pupils are equal, round, and reactive to light. Conjunctivae and EOM are normal. No scleral icterus.   Neck: Normal range of motion. Neck supple. No JVD present. No tracheal deviation present.    Cardiovascular: Normal rate, regular rhythm, normal heart sounds and intact distal pulses. Exam reveals no gallop and no friction rub.   No murmur heard.  Pulmonary/Chest: Effort normal and breath sounds normal. No stridor. No respiratory distress. He has no wheezes. He has no rales.   Abdominal: Soft. Bowel sounds are normal. He exhibits no distension and no mass. There is no tenderness.   Neurological: He is alert and oriented to person, place, and time.   Skin: Skin is warm and dry. He is not diaphoretic.   Psychiatric: He has a normal mood and affect. His behavior is normal. Judgment and thought content normal.   Nursing note and vitals reviewed.      Significant Labs: All pertinent labs within the past 24 hours have been reviewed.    Significant Imaging: I have reviewed all pertinent imaging results/findings within the past 24 hours.  I have reviewed and interpreted all pertinent imaging results/findings within the past 24 hours.

## 2020-01-11 NOTE — ASSESSMENT & PLAN NOTE
Was 132 on admission and increased to 341 on 1/10/2020.  Abdominal pain resolved and tolerating diet well.  Had Acute pancreatitis in 8/2019.  His level was higher at 846 on 1/11/2020.  Given no pain and no pancreas changes on CT, will not alter treatment.  Discussed with Dr. Bajwa and agrees with plan.

## 2020-01-12 LAB
ALBUMIN SERPL BCP-MCNC: 2.8 G/DL (ref 3.5–5.2)
ALP SERPL-CCNC: 87 U/L (ref 55–135)
ALT SERPL W/O P-5'-P-CCNC: 34 U/L (ref 10–44)
ANION GAP SERPL CALC-SCNC: 9 MMOL/L (ref 8–16)
AST SERPL-CCNC: 76 U/L (ref 10–40)
BASOPHILS # BLD AUTO: 0.03 K/UL (ref 0–0.2)
BASOPHILS NFR BLD: 0.4 % (ref 0–1.9)
BILIRUB SERPL-MCNC: 5.2 MG/DL (ref 0.1–1)
BUN SERPL-MCNC: 18 MG/DL (ref 6–20)
CALCIUM SERPL-MCNC: 8.5 MG/DL (ref 8.7–10.5)
CHLORIDE SERPL-SCNC: 104 MMOL/L (ref 95–110)
CO2 SERPL-SCNC: 22 MMOL/L (ref 23–29)
CREAT SERPL-MCNC: 2.2 MG/DL (ref 0.5–1.4)
DIFFERENTIAL METHOD: ABNORMAL
E COLI SXT1 STL QL IA: NEGATIVE
E COLI SXT2 STL QL IA: NEGATIVE
EOSINOPHIL # BLD AUTO: 0.1 K/UL (ref 0–0.5)
EOSINOPHIL NFR BLD: 0.8 % (ref 0–8)
ERYTHROCYTE [DISTWIDTH] IN BLOOD BY AUTOMATED COUNT: 16.8 % (ref 11.5–14.5)
EST. GFR  (AFRICAN AMERICAN): 40 ML/MIN/1.73 M^2
EST. GFR  (NON AFRICAN AMERICAN): 35 ML/MIN/1.73 M^2
GLUCOSE SERPL-MCNC: 104 MG/DL (ref 70–110)
HCT VFR BLD AUTO: 31.4 % (ref 40–54)
HGB BLD-MCNC: 10.5 G/DL (ref 14–18)
IMM GRANULOCYTES # BLD AUTO: 0.01 K/UL (ref 0–0.04)
IMM GRANULOCYTES NFR BLD AUTO: 0.1 % (ref 0–0.5)
LYMPHOCYTES # BLD AUTO: 1.7 K/UL (ref 1–4.8)
LYMPHOCYTES NFR BLD: 22 % (ref 18–48)
MAGNESIUM SERPL-MCNC: 1.5 MG/DL (ref 1.6–2.6)
MCH RBC QN AUTO: 36 PG (ref 27–31)
MCHC RBC AUTO-ENTMCNC: 33.4 G/DL (ref 32–36)
MCV RBC AUTO: 108 FL (ref 82–98)
MONOCYTES # BLD AUTO: 1 K/UL (ref 0.3–1)
MONOCYTES NFR BLD: 13.3 % (ref 4–15)
NEUTROPHILS # BLD AUTO: 4.8 K/UL (ref 1.8–7.7)
NEUTROPHILS NFR BLD: 63.4 % (ref 38–73)
NRBC BLD-RTO: 0 /100 WBC
PHOSPHATE SERPL-MCNC: 2.7 MG/DL (ref 2.7–4.5)
PLATELET # BLD AUTO: 72 K/UL (ref 150–350)
PMV BLD AUTO: 11.6 FL (ref 9.2–12.9)
POTASSIUM SERPL-SCNC: 3.3 MMOL/L (ref 3.5–5.1)
PROT SERPL-MCNC: 7.9 G/DL (ref 6–8.4)
RBC # BLD AUTO: 2.92 M/UL (ref 4.6–6.2)
SODIUM SERPL-SCNC: 135 MMOL/L (ref 136–145)
WBC # BLD AUTO: 7.58 K/UL (ref 3.9–12.7)

## 2020-01-12 PROCEDURE — 25000003 PHARM REV CODE 250: Performed by: INTERNAL MEDICINE

## 2020-01-12 PROCEDURE — 63600175 PHARM REV CODE 636 W HCPCS: Performed by: PHYSICIAN ASSISTANT

## 2020-01-12 PROCEDURE — 84100 ASSAY OF PHOSPHORUS: CPT

## 2020-01-12 PROCEDURE — 83735 ASSAY OF MAGNESIUM: CPT

## 2020-01-12 PROCEDURE — 25000003 PHARM REV CODE 250: Performed by: PHYSICIAN ASSISTANT

## 2020-01-12 PROCEDURE — 94761 N-INVAS EAR/PLS OXIMETRY MLT: CPT

## 2020-01-12 PROCEDURE — C9113 INJ PANTOPRAZOLE SODIUM, VIA: HCPCS | Performed by: PHYSICIAN ASSISTANT

## 2020-01-12 PROCEDURE — S0030 INJECTION, METRONIDAZOLE: HCPCS | Performed by: PHYSICIAN ASSISTANT

## 2020-01-12 PROCEDURE — 85025 COMPLETE CBC W/AUTO DIFF WBC: CPT

## 2020-01-12 PROCEDURE — 11000001 HC ACUTE MED/SURG PRIVATE ROOM

## 2020-01-12 PROCEDURE — 63600175 PHARM REV CODE 636 W HCPCS: Performed by: INTERNAL MEDICINE

## 2020-01-12 PROCEDURE — 36415 COLL VENOUS BLD VENIPUNCTURE: CPT

## 2020-01-12 PROCEDURE — 80053 COMPREHEN METABOLIC PANEL: CPT

## 2020-01-12 PROCEDURE — 99233 SBSQ HOSP IP/OBS HIGH 50: CPT | Mod: ,,, | Performed by: INTERNAL MEDICINE

## 2020-01-12 PROCEDURE — 99233 PR SUBSEQUENT HOSPITAL CARE,LEVL III: ICD-10-PCS | Mod: ,,, | Performed by: INTERNAL MEDICINE

## 2020-01-12 RX ORDER — HYDRALAZINE HYDROCHLORIDE 25 MG/1
25 TABLET, FILM COATED ORAL EVERY 8 HOURS PRN
Status: DISCONTINUED | OUTPATIENT
Start: 2020-01-12 | End: 2020-01-13 | Stop reason: HOSPADM

## 2020-01-12 RX ORDER — POTASSIUM CHLORIDE 20 MEQ/1
20 TABLET, EXTENDED RELEASE ORAL 2 TIMES DAILY
Status: COMPLETED | OUTPATIENT
Start: 2020-01-12 | End: 2020-01-12

## 2020-01-12 RX ORDER — MAGNESIUM SULFATE HEPTAHYDRATE 40 MG/ML
2 INJECTION, SOLUTION INTRAVENOUS ONCE
Status: COMPLETED | OUTPATIENT
Start: 2020-01-12 | End: 2020-01-12

## 2020-01-12 RX ADMIN — METRONIDAZOLE 500 MG: 500 INJECTION, SOLUTION INTRAVENOUS at 12:01

## 2020-01-12 RX ADMIN — METRONIDAZOLE 500 MG: 500 INJECTION, SOLUTION INTRAVENOUS at 09:01

## 2020-01-12 RX ADMIN — METRONIDAZOLE 500 MG: 500 INJECTION, SOLUTION INTRAVENOUS at 04:01

## 2020-01-12 RX ADMIN — POTASSIUM CHLORIDE 20 MEQ: 1500 TABLET, EXTENDED RELEASE ORAL at 08:01

## 2020-01-12 RX ADMIN — POTASSIUM CHLORIDE 20 MEQ: 1500 TABLET, EXTENDED RELEASE ORAL at 10:01

## 2020-01-12 RX ADMIN — MAGNESIUM SULFATE IN WATER 2 G: 40 INJECTION, SOLUTION INTRAVENOUS at 01:01

## 2020-01-12 RX ADMIN — CIPROFLOXACIN 400 MG: 2 INJECTION, SOLUTION INTRAVENOUS at 01:01

## 2020-01-12 RX ADMIN — PROPRANOLOL HYDROCHLORIDE 10 MG: 10 TABLET ORAL at 04:01

## 2020-01-12 RX ADMIN — TRAZODONE HYDROCHLORIDE 50 MG: 50 TABLET ORAL at 08:01

## 2020-01-12 RX ADMIN — PROPRANOLOL HYDROCHLORIDE 10 MG: 10 TABLET ORAL at 09:01

## 2020-01-12 RX ADMIN — CIPROFLOXACIN 400 MG: 2 INJECTION, SOLUTION INTRAVENOUS at 02:01

## 2020-01-12 RX ADMIN — PANTOPRAZOLE SODIUM 40 MG: 40 INJECTION, POWDER, LYOPHILIZED, FOR SOLUTION INTRAVENOUS at 08:01

## 2020-01-12 RX ADMIN — PROPRANOLOL HYDROCHLORIDE 10 MG: 10 TABLET ORAL at 08:01

## 2020-01-12 RX ADMIN — PANTOPRAZOLE SODIUM 40 MG: 40 INJECTION, POWDER, LYOPHILIZED, FOR SOLUTION INTRAVENOUS at 09:01

## 2020-01-12 RX ADMIN — HYDRALAZINE HYDROCHLORIDE 25 MG: 25 TABLET, FILM COATED ORAL at 10:01

## 2020-01-12 NOTE — ASSESSMENT & PLAN NOTE
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 Reggie Wilcox at [facility]     Seen by Dr. RENE Guider: "- cipro/flagyl - could transition to PO since tolerating  - monitor serial H/H and for evidence of ongoing overt bleeding (low suspicion for variceal bleeding with low BUN)  - continue beta blocker and PPI  - no plans for endoscopy for now unless ongoing blood loss  - will arrange for clinic follow up to make sure diverticulitis has resolved - will need to obtain prior colonoscopy report- if not done recently, may need a follow up colonoscopy to exclude colon malignancy mimicking diverticulitis".    TBili 5.2 and AST 76/ALT 34.  Albumin 2.8.  "

## 2020-01-12 NOTE — ASSESSMENT & PLAN NOTE
- s/p banding x 2   - follows w/ GI @ [facility]   - last EGD 5/19 showed these were stable  - last banding 10/18   - continue PPI via IV while NPO  - continue propranolol    -see above

## 2020-01-12 NOTE — PLAN OF CARE
AAOX4. VSS.Pt free of trauma, falls, injury and skin breakdown. Pt denies pain at this time.LR infusing @125ml/hr. Pt has been eating and voiding(via urinal) adequately throughout shift. Cardiac monitoring maintained.Pt ambulates and repositions self independently. Purposeful hourly rounding. Pt has call light in reach, side rails up X2, bed in low position, SCDs and nonskid socks on. Pt lying in bed in no distress.

## 2020-01-12 NOTE — ASSESSMENT & PLAN NOTE
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 Reggie Wilcox at [facility]     Seen by Dr. RENE Guider: "- cipro/flagyl - could transition to PO since tolerating  - monitor serial H/H and for evidence of ongoing overt bleeding (low suspicion for variceal bleeding with low BUN)  - continue beta blocker and PPI  - no plans for endoscopy for now unless ongoing blood loss  - will arrange for clinic follow up to make sure diverticulitis has resolved - will need to obtain prior colonoscopy report- if not done recently, may need a follow up colonoscopy to exclude colon malignancy mimicking diverticulitis".    TBili 5.2 and AST 76/ALT 34.  Albumin 2.8.  "

## 2020-01-12 NOTE — PROGRESS NOTES
[facility]  Hospital Medicine  Progress Note    Patient Name: Irvin Diaz  MRN: [id number]  Patient Class: IP- Inpatient   Admission Date: 1/8/2020  Length of Stay: 3 days  Attending Physician: Roberto Becker MD  Primary Care Provider: [facility]        Subjective:     Principal Problem:Rectal bleeding        HPI:  Mr. Irvin Diaz is a 44 y.o. male, with PMH of UGIB, alcoholic cirrhosis, esophageal varicies (s/p banding x 2, on propranolol), previously resected benign colonic lesion, diverticulosis per colonoscopy 1 year ago, who presented to [facility] on 1/8/20 with left sided abdominal pain, frequent stooling and passage of dark red blood twice just PTA. He stated over the past few days he has had increased frequency of passing stool, and decreased urination. He has been unable to tolerate PO, and has been nauseous despite PO zofran. He states he was on blood thinners in the past 2/2 PE, but has been off x 1 year. He was evaluated in the ED with imaging c/w diverticulitis and thickening of the left colonic wall. He was admitted to inpatient status.     Overview/Hospital Course:  Patient is overall improved with resolution of abdominal pain and rectal bleeding on 1/10/2020.   Given his creatinine is still 2.9 on 1/10/2020 and urine lytes consistent with pre-renal state, so restarted IV fluids.       Creatinine is slightly better at 2.6 on 1/11/2020.  Hemoglobin has been stable at 9.7.  Lipase again elevated, but pain free and tolerating diet.  Creatinine on 1/12/2020 was down to 2.2.  K+ ws 3.3, so will check magnesium and replace with KCL.        Interval History: Patient overall stable this morning with no abdominal pain.  No rectal bleeding.  Urine output improved.    Review of Systems   Constitutional: Negative for appetite change, chills, diaphoresis and fever.   Respiratory: Negative for cough, shortness of breath and wheezing.    Cardiovascular:  Negative for chest pain and palpitations.   Gastrointestinal: Negative for abdominal pain, blood in stool, constipation, diarrhea, nausea and vomiting.   Genitourinary: Negative for decreased urine volume, difficulty urinating, dysuria, frequency, hematuria and urgency.   Musculoskeletal: Negative for back pain, neck pain and neck stiffness.   Skin: Negative for rash and wound.   Neurological: Negative for dizziness, syncope, weakness, light-headedness and headaches.   Psychiatric/Behavioral: Negative for confusion and decreased concentration.     Objective:     Vital Signs (Most Recent):  Temp: 98.6 °F (37 °C) (01/12/20 0747)  Pulse: 68 (01/12/20 0747)  Resp: 18 (01/12/20 0747)  BP: (!) 148/93 (01/12/20 0747)  SpO2: 99 % (01/12/20 0747) Vital Signs (24h Range):  Temp:  [98.3 °F (36.8 °C)-98.9 °F (37.2 °C)] 98.6 °F (37 °C)  Pulse:  [68-82] 68  Resp:  [16-20] 18  SpO2:  [95 %-99 %] 99 %  BP: (127-161)/(69-93) 148/93     Weight: 83.2 kg (183 lb 6.8 oz)  Body mass index is 24.88 kg/m².    Intake/Output Summary (Last 24 hours) at 1/12/2020 0946  Last data filed at 1/12/2020 0919  Gross per 24 hour   Intake 2040 ml   Output 2525 ml   Net -485 ml      Physical Exam   Constitutional: He is oriented to person, place, and time. Vital signs are normal. He appears well-developed and well-nourished.  Non-toxic appearance. He does not have a sickly appearance. He does not appear ill. No distress.   HENT:   Head: Normocephalic and atraumatic.   Eyes: Pupils are equal, round, and reactive to light. Conjunctivae and EOM are normal. No scleral icterus.   Neck: Normal range of motion. Neck supple. No JVD present. No tracheal deviation present.   Cardiovascular: Normal rate, regular rhythm, normal heart sounds and intact distal pulses. Exam reveals no gallop and no friction rub.   No murmur heard.  Pulmonary/Chest: Effort normal and breath sounds normal. No stridor. No respiratory distress. He has no wheezes. He has no rales.  "  Abdominal: Soft. Bowel sounds are normal. He exhibits no distension and no mass. There is no tenderness.   Neurological: He is alert and oriented to person, place, and time.   Skin: Skin is warm and dry. He is not diaphoretic.   Psychiatric: He has a normal mood and affect. His behavior is normal. Judgment and thought content normal.   Nursing note and vitals reviewed.      Significant Labs: All pertinent labs within the past 24 hours have been reviewed.    Significant Imaging: I have reviewed all pertinent imaging results/findings within the past 24 hours.      Assessment/Plan:      * Rectal bleeding  - Mr. Irvin Diaz is admitted to inpatient status   - he has had 2 episodes of dark red blood per rectum prior to admission  - he had a colonoscopy showing diverticulosis approx. 1 year ago  - NPO initially.  - Hgb dropped from 11.4 on admission to 9.7 (1/11/2020) - has been stable since initial drop.  Hgb was 10.5 in 9/2019.  Hgb on 1/12/2020 was 10.5.    - GI consulted - "Irvin Diaz is a 44 y.o. male with a history of HTN, prior PE, arthritis and ETOH cirrhosis complicated by esophageal varices who presented with LUQ pain as well as nausea, vomiting and diarrhea. Suspect diverticulitis based on CT findings with limited rectal bleeding - brown stool noted on ER rectal exam.  Plan:  - cipro/flagyl - could transition to PO since tolerating  - monitor serial H/H and for evidence of ongoing overt bleeding (low suspicion for variceal bleeding with low BUN)  - continue beta blocker and PPI  - no plans for endoscopy for now unless ongoing blood loss  - will arrange for clinic follow up to make sure diverticulitis has resolved - will need to obtain prior colonoscopy report- if not done recently, may need a follow up colonoscopy to exclude colon malignancy mimicking diverticulitis".                 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 Reggie Wilcox at [facility]     Seen by GI, Dr. Burkr: "- cipro/flagyl - could transition to PO since tolerating  - monitor serial H/H and for evidence of ongoing overt bleeding (low suspicion for variceal bleeding with low BUN)  - continue beta blocker and PPI  - no plans for endoscopy for now unless ongoing blood loss  - will arrange for clinic follow up to make sure diverticulitis has resolved - will need to obtain prior colonoscopy report- if not done recently, may need a follow up colonoscopy to exclude colon malignancy mimicking diverticulitis".    TBili 5.2 and AST 76/ALT 34.  Albumin 2.8.    ENA (acute kidney injury)  - endorses reduced urination   - BUN &  Cr are elevated at 23 & 2.8 upon admission   - ENA studies & retroperitoneal US ordered   - s/p I fluids in ED   - monitor UOP and renal function     He was not actively on IV fluids on 1/10/2020.  Given his creatinine is still 2.9 and urine lytes consistent with pre-renal state, we restarted IV fluids 1/10/2020.  Creatinine is 2.6 on 1/11/2020.  Down to 2.2 today.  K+ low at 3.2, so will replace with KCl and check Mag      Elevated lipase  Was 132 on admission and increased to 341 on 1/10/2020.  Abdominal pain resolved and tolerating diet well.  Had Acute pancreatitis in 8/2019.  His level was higher at 846 on 1/11/2020.  Given no pain and no pancreas changes on CT, will not alter treatment.  Discussed with Dr. Bajwa and agrees with plan.      Elevated LFTs  - chronic  - associated w/ alcoholic cirrhosis of the liver  - patient states he has been sober >1 year  - acute hep panel ordered    - last hep panel negative 5/19   - Tbili down to 4.9 on 1/11/2020 from 7.4 on admission.  AST/ALT improved.  Follow.  -see above    Benign essential HTN  Normotensive on Admission, but now a little elevated.  - Continue propranolol as above.  Consider adding Amlodipine.  - " monitor    Esophageal varices without bleeding  - s/p banding x 2   - follows w/ GI @ [facility]   - last EGD 5/19 showed these were stable  - last banding 10/18   - continue PPI via IV while NPO  - continue propranolol    -see above      History of pulmonary embolus (PE)  - no longer anticoagulated on admission.  -Took coumadin in the past.  Has been off and note in 7/2019 at [facility] confirmed this.  Will not give SQ heparin given low plts.  Continue SCDs      Thrombocytopenia  - stable w/ prior measurements   - suspect 2/2 cirrhosis   - monitor        VTE Risk Mitigation (From admission, onward)         Ordered     IP VTE LOW RISK PATIENT  Once      01/09/20 0246     Place sequential compression device  Until discontinued      01/09/20 0246     Reason for No Pharmacological VTE Prophylaxis  Once     Question:  Reasons:  Answer:  Active Bleeding    01/09/20 0246                      Roberto Becker MD  Department of Hospital Medicine   [facility]

## 2020-01-12 NOTE — ASSESSMENT & PLAN NOTE
Normotensive on Admission, but now a little elevated.  - Continue propranolol as above.  Consider adding Amlodipine.  - monitor

## 2020-01-12 NOTE — ASSESSMENT & PLAN NOTE
- s/p banding x 2   - follows w/ GI @ [facility]   - last EGD 5/19 showed these were stable  - last banding 10/18   - continue PPI via IV while NPO  - continue propranolol    -see above

## 2020-01-12 NOTE — ASSESSMENT & PLAN NOTE
"- Mr. Irvin Diaz is admitted to inpatient status   - he has had 2 episodes of dark red blood per rectum prior to admission  - he had a colonoscopy showing diverticulosis approx. 1 year ago  - NPO initially.  - Hgb dropped from 11.4 on admission to 9.7 (1/11/2020) - has been stable since initial drop.  Hgb was 10.5 in 9/2019.  Hgb on 1/12/2020 was 10.5.    - GI consulted - "Irvin Diaz is a 44 y.o. male with a history of HTN, prior PE, arthritis and ETOH cirrhosis complicated by esophageal varices who presented with LUQ pain as well as nausea, vomiting and diarrhea. Suspect diverticulitis based on CT findings with limited rectal bleeding - brown stool noted on ER rectal exam.  Plan:  - cipro/flagyl - could transition to PO since tolerating  - monitor serial H/H and for evidence of ongoing overt bleeding (low suspicion for variceal bleeding with low BUN)  - continue beta blocker and PPI  - no plans for endoscopy for now unless ongoing blood loss  - will arrange for clinic follow up to make sure diverticulitis has resolved - will need to obtain prior colonoscopy report- if not done recently, may need a follow up colonoscopy to exclude colon malignancy mimicking diverticulitis".               "

## 2020-01-12 NOTE — ASSESSMENT & PLAN NOTE
- no longer anticoagulated on admission.  -Took coumadin in the past.  Has been off and note in 7/2019 at [facility] confirmed this.  Will not give SQ heparin given low plts.  Continue SCDs

## 2020-01-12 NOTE — ASSESSMENT & PLAN NOTE
"- endorses reduced urination   - BUN &  Cr are elevated at 23 & 2.8 upon admission   - ENA studies & retroperitoneal US ordered   - s/p I fluids in ED   - monitor UOP and renal function     He was not actively on IV fluids on 1/10/2020.  Given his creatinine is still 2.9 and urine lytes consistent with pre-renal state, we restarted IV fluids 1/10/2020.  Creatinine is 2.6 on 1/11/2020.  Down to 2.2 on 1/12/2020.  Changed to LR per Renal.  Replaced K+ and Mag as above.    Seen by Renal (Chap[place]):"   1. Slowly resolving Non-Oliguric acute kidney injury on normal kidney function secondary to volume depletion and early ATN from nausea, vomiting, diarrhea and poor oral intake (N17.9, N17.0):  See HPI.  Denies NSAIDS.  Pt known to us from last year with ATN following volume depletion and hypotension after ETOH.  CT abd w/o obstruction.  Responded to aggressive IVF's, now on oral fluids.  Consider changing PPI to H2 blocker if able.  Will follow trend outpt.  Cr 2.0 today. Stool culture NGTD.Urine culture neg.  Renally dose meds, avoid nephrotoxins, and monitor I/O's closely.  2. Hypokalemia/ Hypomagnesemia: Replace both, check phos.  3. N/VD early diverticulitis/ Pancreatitis:  Defer to GI.  Renally dose antibx.  Lipase markedly elevated, LFTs improving.  4. Anemia of GI loss:  Monitor H/H. Fairly stable over last few days  5. Thrombocytopenia from cirrhosis vs other:  Improving.Defer.   Okay for discharge from renal standpoint. Will need follow up in my clinic within 10-14 day with CMP and CBC a few days prior to OV".    "

## 2020-01-12 NOTE — DISCHARGE SUMMARY
[facility]  Hospital Medicine  Discharge Summary      Patient Name: Irvin Diaz  MRN: [id number]  Admission Date: 1/8/2020  Hospital Length of Stay: 4 days  Discharge Date and Time:  01/13/2020 5:01 PM  Attending Physician: Roberto Becker MD   Discharging Provider: Roberto Becker MD  Primary Care Provider: [facility]      HPI:   Mr. Irvin Diaz is a 44 y.o. male, with PMH of UGIB, alcoholic cirrhosis, esophageal varicies (s/p banding x 2, on propranolol), previously resected benign colonic lesion, diverticulosis per colonoscopy 1 year ago, who presented to [facility] ED on 1/8/20 with left sided abdominal pain, frequent stooling and passage of dark red blood twice just PTA. He stated over the past few days he has had increased frequency of passing stool, and decreased urination. He has been unable to tolerate PO, and has been nauseous despite PO zofran. He states he was on blood thinners in the past 2/2 PE, but has been off x 1 year. He was evaluated in the ED with imaging c/w diverticulitis and thickening of the left colonic wall. He was admitted to inpatient status.     * No surgery found *      Hospital Course:   Given his creatinine is still 2.9 on 1/10/2020 and urine lytes consistent with pre-renal state, so restarted IV fluids.     Creatinine is slightly better at 2.6 on 1/11/2020.  Hemoglobin has been stable at 9.7.    Creatinine on 1/12/2020 was down to 2.2.  K+ ws 3.3, so will check magnesium (1.5) and replaced with KCL and Magnesium Sulfate.  Creatinine down to 2.0 on 1/13/2020.  Per Renal, ok to be discharged with follow up labs in 2 weeks.    Patient is overall improved with resolution of abdominal pain and rectal bleeding on 1/10/2020.   Diet advanced and tolerated well.  Lipase again elevated, but pain free and tolerating diet.  Will complete a 10 day course of Cipro/Flagyl for Diverticulitis on discharge.    See below.               Consults:  "  Consults (From admission, onward)        Status Ordering Provider     Inpatient consult to Gastroenterology  Once     Provider:  Cyndee Bajwa MD    Completed AVELINO STEPHENSON     Inpatient consult to Nephrology  Once     Provider:  Shamar Benitez NP    Completed ELDA FRANCES          * Rectal bleeding-resolved as of 1/13/2020  - Mr. Irvin Diaz is admitted to inpatient status   - he has had 2 episodes of dark red blood per rectum prior to admission  - he had a colonoscopy showing diverticulosis approx. 1 year ago  - NPO initially.  - Hgb dropped from 11.4 on admission to 9.7 (1/11/2020) - has been stable since initial drop.  Hgb was 10.5 in 9/2019.  Hgb on 1/12/2020 was 10.5.    - GI consulted - "Irvin Diaz is a 44 y.o. male with a history of HTN, prior PE, arthritis and ETOH cirrhosis complicated by esophageal varices who presented with LUQ pain as well as nausea, vomiting and diarrhea. Suspect diverticulitis based on CT findings with limited rectal bleeding - brown stool noted on ER rectal exam.  Plan:  - cipro/flagyl - could transition to PO since tolerating  - monitor serial H/H and for evidence of ongoing overt bleeding (low suspicion for variceal bleeding with low BUN)  - continue beta blocker and PPI  - no plans for endoscopy for now unless ongoing blood loss  - will arrange for clinic follow up to make sure diverticulitis has resolved - will need to obtain prior colonoscopy report- if not done recently, may need a follow up colonoscopy to exclude colon malignancy mimicking diverticulitis".     Will d/c home to complete a 10 day course of antibiotics              Diverticulitis  - cipro/flagyl ordered in ED, continue.  Will dose renally.   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 Reggie Wilcox at [facility]     Seen by GI, Dr. Burkr: "- cipro/flagyl - could transition " "to PO since tolerating  - monitor serial H/H and for evidence of ongoing overt bleeding (low suspicion for variceal bleeding with low BUN)  - continue beta blocker and PPI  - no plans for endoscopy for now unless ongoing blood loss  - will arrange for clinic follow up to make sure diverticulitis has resolved - will need to obtain prior colonoscopy report- if not done recently, may need a follow up colonoscopy to exclude colon malignancy mimicking diverticulitis".    TBili 5.2 and AST 76/ALT 34.  Albumin 2.8.    ENA (acute kidney injury)  - endorses reduced urination   - BUN &  Cr are elevated at 23 & 2.8 upon admission   - ENA studies & retroperitoneal US ordered   - s/p I fluids in ED   - monitor UOP and renal function     He was not actively on IV fluids on 1/10/2020.  Given his creatinine is still 2.9 and urine lytes consistent with pre-renal state, we restarted IV fluids 1/10/2020.  Creatinine is 2.6 on 1/11/2020.  Down to 2.2 on 1/12/2020.  Changed to LR per Renal.  Replaced K+ and Mag as above.    Seen by Renal ([place]):"   1. Slowly resolving Non-Oliguric acute kidney injury on normal kidney function secondary to volume depletion and early ATN from nausea, vomiting, diarrhea and poor oral intake (N17.9, N17.0):  See HPI.  Denies NSAIDS.  Pt known to us from last year with ATN following volume depletion and hypotension after ETOH.  CT abd w/o obstruction.  Responded to aggressive IVF's, now on oral fluids.  Consider changing PPI to H2 blocker if able.  Will follow trend outpt.  Cr 2.0 today. Stool culture NGTD.Urine culture neg.  Renally dose meds, avoid nephrotoxins, and monitor I/O's closely.  2. Hypokalemia/ Hypomagnesemia: Replace both, check phos.  3. N/VD early diverticulitis/ Pancreatitis:  Defer to GI.  Renally dose antibx.  Lipase markedly elevated, LFTs improving.  4. Anemia of GI loss:  Monitor H/H. Fairly stable over last few days  5. Thrombocytopenia from cirrhosis vs other:  Improving.Defer. " "  Okay for discharge from renal standpoint. Will need follow up in my clinic within 10-14 day with CMP and CBC a few days prior to OV".      Elevated LFTs  - chronic  - associated w/ alcoholic cirrhosis of the liver  - patient states he has been sober >1 year  - acute hep panel ordered    - last hep panel negative 5/19   - Tbili down to 4.9 on 1/11/2020 from 7.4 on admission.  AST/ALT improved.  Follow.  -see above    Benign essential HTN  Normotensive on Admission, but now a little elevated.  - Continue propranolol as above.  Will Add Amlodipine 5mg once a day.      Esophageal varices without bleeding  - s/p banding x 2   - follows w/ GI @ [facility]   - last EGD 5/19 showed these were stable  - last banding 10/18   - continue PPI via IV while NPO  - continue propranolol    -see above      History of pulmonary embolus (PE)  - no longer anticoagulated on admission.  -Took coumadin in the past.  Has been off and note in 7/2019 at [facility] confirmed this.  Will not give SQ heparin given low plts.  Continue SCDs      Thrombocytopenia  - stable w/ prior measurements   - suspect 2/2 cirrhosis   - monitor      Final Active Diagnoses:    Diagnosis Date Noted POA    Diverticulitis [K57.92] 01/09/2020 Yes    Alcoholic cirrhosis [K70.30] 05/11/2019 Yes    ENA (acute kidney injury) [N17.9] 01/09/2020 Yes    Elevated lipase [R74.8] 01/11/2020 Yes    Benign essential HTN [I10] 07/25/2019 Yes    Elevated LFTs [R94.5] 07/25/2019 Yes    Thrombocytopenia [D69.6] 05/11/2019 Yes    History of pulmonary embolus (PE) [Z86.711] 05/11/2019 Yes    Esophageal varices without bleeding [I85.00] 05/11/2019 Yes      Problems Resolved During this Admission:    Diagnosis Date Noted Date Resolved POA    PRINCIPAL PROBLEM:  Rectal bleeding [K62.5] 01/09/2020 01/13/2020 Yes       Discharged Condition: good    Disposition:     Follow Up:  Follow-up Information     [facility] In 1 week.    Specialties:  Internal Medicine, " Family Medicine  Contact information:  [address] [address]  [phone]             Precious Samson MD In 2 weeks.    Specialty:  Nephrology  Contact information:  [address] [facility] [address] [facility] [address]  [place] [address]  [phone]                 Patient Instructions:   No discharge procedures on file.    Significant Diagnostic Studies: Labs:   CBC   Recent Labs   Lab 01/12/20  0604 01/13/20  0408   WBC 7.58 8.67   HGB 10.5* 12.2*   HCT 31.4* 36.1*   PLT 72* 106*       Pending Diagnostic Studies:     Procedure Component Value Units Date/Time    [id number] Evaluation [[id number]] Collected:  01/10/20 1115    Order Status:  Sent Lab Status:  In process Updated:  01/10/20 1135    Specimen:  Blood          Medications:  Reconciled Home Medications:      Medication List      START taking these medications    amLODIPine 5 MG tablet  Commonly known as:  NORVASC  Take 1 tablet (5 mg total) by mouth once daily.     ciprofloxacin HCl 500 MG tablet  Commonly known as:  CIPRO  Take 1 tablet (500 mg total) by mouth every 12 (twelve) hours. for 5 days     metroNIDAZOLE 500 MG tablet  Commonly known as:  FLAGYL  Take 1 tablet (500 mg total) by mouth every 6 (six) hours. for 5 days        CONTINUE taking these medications    ondansetron 4 MG Tbdl  Commonly known as:  ZOFRAN-ODT  Take 1 tablet (4 mg total) by mouth every 6 (six) hours as needed.     pantoprazole 40 MG tablet  Commonly known as:  PROTONIX  Take 1 tablet (40 mg total) by mouth once daily.     propranolol 10 MG tablet  Commonly known as:  INDERAL  Take 1 tablet (10 mg total) by mouth 3 (three) times daily.     traZODone 50 MG tablet  Commonly known as:  DESYREL  Take 50 mg by mouth every evening.          BMP and CBC labs needed in 10-14 days.    Time spent on the discharge of patient: 35 minutes  Patient was seen and examined on the date of discharge and determined to be suitable for discharge.         Roberto Becker MD  Department of Hospital  Medicine  [facility]

## 2020-01-12 NOTE — PLAN OF CARE
VSS and afebrile, AAOx4. No pain reported this shift. Ordered diet tolerated well. Urinal at bedside, I&Os recorded. Tele maintained - NSR. No significant events occurred this shift. Plan of care reviewed with patient. Purposeful rounding done, call light at bed side, bed at lowest position, brakes on, non-skid socks on, will continue to monitor.

## 2020-01-12 NOTE — SUBJECTIVE & OBJECTIVE
Interval History: Patient overall stable this morning with no abdominal pain.  No rectal bleeding.  Urine output improved.    Review of Systems   Constitutional: Negative for appetite change, chills, diaphoresis and fever.   Respiratory: Negative for cough, shortness of breath and wheezing.    Cardiovascular: Negative for chest pain and palpitations.   Gastrointestinal: Negative for abdominal pain, blood in stool, constipation, diarrhea, nausea and vomiting.   Genitourinary: Negative for decreased urine volume, difficulty urinating, dysuria, frequency, hematuria and urgency.   Musculoskeletal: Negative for back pain, neck pain and neck stiffness.   Skin: Negative for rash and wound.   Neurological: Negative for dizziness, syncope, weakness, light-headedness and headaches.   Psychiatric/Behavioral: Negative for confusion and decreased concentration.     Objective:     Vital Signs (Most Recent):  Temp: 98.6 °F (37 °C) (01/12/20 0747)  Pulse: 68 (01/12/20 0747)  Resp: 18 (01/12/20 0747)  BP: (!) 148/93 (01/12/20 0747)  SpO2: 99 % (01/12/20 0747) Vital Signs (24h Range):  Temp:  [98.3 °F (36.8 °C)-98.9 °F (37.2 °C)] 98.6 °F (37 °C)  Pulse:  [68-82] 68  Resp:  [16-20] 18  SpO2:  [95 %-99 %] 99 %  BP: (127-161)/(69-93) 148/93     Weight: 83.2 kg (183 lb 6.8 oz)  Body mass index is 24.88 kg/m².    Intake/Output Summary (Last 24 hours) at 1/12/2020 0946  Last data filed at 1/12/2020 0919  Gross per 24 hour   Intake 2040 ml   Output 2525 ml   Net -485 ml      Physical Exam   Constitutional: He is oriented to person, place, and time. Vital signs are normal. He appears well-developed and well-nourished.  Non-toxic appearance. He does not have a sickly appearance. He does not appear ill. No distress.   HENT:   Head: Normocephalic and atraumatic.   Eyes: Pupils are equal, round, and reactive to light. Conjunctivae and EOM are normal. No scleral icterus.   Neck: Normal range of motion. Neck supple. No JVD present. No tracheal  deviation present.   Cardiovascular: Normal rate, regular rhythm, normal heart sounds and intact distal pulses. Exam reveals no gallop and no friction rub.   No murmur heard.  Pulmonary/Chest: Effort normal and breath sounds normal. No stridor. No respiratory distress. He has no wheezes. He has no rales.   Abdominal: Soft. Bowel sounds are normal. He exhibits no distension and no mass. There is no tenderness.   Neurological: He is alert and oriented to person, place, and time.   Skin: Skin is warm and dry. He is not diaphoretic.   Psychiatric: He has a normal mood and affect. His behavior is normal. Judgment and thought content normal.   Nursing note and vitals reviewed.      Significant Labs: All pertinent labs within the past 24 hours have been reviewed.    Significant Imaging: I have reviewed all pertinent imaging results/findings within the past 24 hours.

## 2020-01-12 NOTE — ASSESSMENT & PLAN NOTE
- endorses reduced urination   - BUN &  Cr are elevated at 23 & 2.8 upon admission   - ENA studies & retroperitoneal US ordered   - s/p I fluids in ED   - monitor UOP and renal function     He was not actively on IV fluids on 1/10/2020.  Given his creatinine is still 2.9 and urine lytes consistent with pre-renal state, we restarted IV fluids 1/10/2020.  Creatinine is 2.6 on 1/11/2020.  Down to 2.2 today.  K+ low at 3.2, so will replace with KCl and check Mag

## 2020-01-12 NOTE — ASSESSMENT & PLAN NOTE
- no longer anticoagulated on admission.  -Took coumadin in the past.  Has been off and note in 7/2019 at [facility] confirmed this.  Will not give SQ heparin given low plts.  Continue SCDs

## 2020-01-12 NOTE — ASSESSMENT & PLAN NOTE
- cipro/flagyl ordered in ED, continue.  Will dose renally.   - no abscess/perforation on imaging  - monitor   -see above

## 2020-01-12 NOTE — ASSESSMENT & PLAN NOTE
"- Mr. Irvin Diaz is admitted to inpatient status   - he has had 2 episodes of dark red blood per rectum prior to admission  - he had a colonoscopy showing diverticulosis approx. 1 year ago  - NPO initially.  - Hgb dropped from 11.4 on admission to 9.7 (1/11/2020) - has been stable since initial drop.  Hgb was 10.5 in 9/2019.  Hgb on 1/12/2020 was 10.5.    - GI consulted - "Irvin Diaz is a 44 y.o. male with a history of HTN, prior PE, arthritis and ETOH cirrhosis complicated by esophageal varices who presented with LUQ pain as well as nausea, vomiting and diarrhea. Suspect diverticulitis based on CT findings with limited rectal bleeding - brown stool noted on ER rectal exam.  Plan:  - cipro/flagyl - could transition to PO since tolerating  - monitor serial H/H and for evidence of ongoing overt bleeding (low suspicion for variceal bleeding with low BUN)  - continue beta blocker and PPI  - no plans for endoscopy for now unless ongoing blood loss  - will arrange for clinic follow up to make sure diverticulitis has resolved - will need to obtain prior colonoscopy report- if not done recently, may need a follow up colonoscopy to exclude colon malignancy mimicking diverticulitis".     Will d/c home to complete a 10 day course of antibiotics            "

## 2020-01-12 NOTE — ASSESSMENT & PLAN NOTE
Normotensive on Admission, but now a little elevated.  - Continue propranolol as above.  Will Add Amlodipine 5mg once a day.

## 2020-01-12 NOTE — PROGRESS NOTES
Progress Note  Nephrology    Consult Requested By: Roberto Becker MD  Reason for Consult: ENA    SUBJECTIVE:     History of Present Illness:  Patient is a 44 y.o. male presents with 2 days of nausea, vomiting, and copious diarrhea.  Evaluation emergency room revealed patient to have acute kidney injury with creatinine of 2.8.  Radiologic studies showing evidence of early diverticulitis and patient admitted and started on IV antibiotics.  Was only given 1 L IV fluids.  This morning creatinine up to 2.9 and consulted for evaluation.    Case discussed with team.  Patient seen and examined.  Known to us from last year following episode of acute kidney injury from volume depletion.  Patient feeling better and has less diarrhea.  Eager to try to eat some solid food.  Urine remains dark jess.  Denies any NSAIDs.  Currently without chest pain, shortness of breath, nausea, vomiting, diarrhea, fever, chills.      Epic reviewed.    Interval History:    Pt again feeling much better today. Only one formed stool today. Denies abd pain. No vomiting and is eating and drinking. Good UOP.    Past Medical History:   Diagnosis Date    Alcoholic cirrhosis of liver     Arthritis     Hypertension     Pulmonary embolism      Past Surgical History:   Procedure Laterality Date    COLON SURGERY      ESOPHAGOGASTRODUODENOSCOPY N/A 7/26/2019    Procedure: EGD (ESOPHAGOGASTRODUODENOSCOPY);  Surgeon: Brian Trivedi MD;  Location: [facility];  Service: Endoscopy;  Laterality: N/A;    HERNIA REPAIR       History reviewed. No pertinent family history.  Social History     Tobacco Use    Smoking status: Never Smoker   Substance Use Topics    Alcohol use: Not Currently     Comment: freq    Drug use: Yes     Types: Marijuana       Review of patient's allergies indicates:   Allergen Reactions    Morphine Itching        Review of Systems:  Constitutional: No fever or chills  Respiratory: No cough or shortness of breath  Cardiovascular: No  chest pain or palpitations  Gastrointestinal: No nausea or vomiting  Neurological: No confusion or weakness    OBJECTIVE:     Vital Signs (Most Recent)  Temp: 98.6 °F (37 °C) (01/12/20 0747)  Pulse: 68 (01/12/20 0747)  Resp: 18 (01/12/20 0747)  BP: (!) 148/93 (01/12/20 0747)  SpO2: 99 % (01/12/20 0747)    Vital Signs Range (Last 24H):  Temp:  [98.3 °F (36.8 °C)-98.9 °F (37.2 °C)]   Pulse:  [68-82]   Resp:  [16-20]   BP: (127-157)/(69-93)   SpO2:  [95 %-99 %]       Intake/Output Summary (Last 24 hours) at 1/12/2020 1223  Last data filed at 1/12/2020 0919  Gross per 24 hour   Intake 2040 ml   Output 2525 ml   Net -485 ml       Physical Exam:  General appearance: Well developed, well nourished  Eyes:  Conjunctivae/corneas clear. PERRL.  Lungs: Normal respiratory effort,   clear to auscultation bilaterally   Heart: Regular rate and rhythm, S1, S2 normal, no murmur, rub or terence.  Abdomen: Soft, semi-tender non-distended; bowel sounds normal; no masses,  no organomegaly  Extremities: No cyanosis or clubbing. No edema.    Skin: Skin color, texture, turgor normal. No rashes or lesions  Neurologic: Normal strength and tone. No focal numbness or weakness       Laboratory:  Recent Labs   Lab 01/12/20  0604   WBC 7.58   RBC 2.92*   HGB 10.5*   HCT 31.4*   PLT 72*   *   MCH 36.0*   MCHC 33.4     BMP:   Recent Labs   Lab 01/12/20  0604      *   K 3.3*      CO2 22*   BUN 18   CREATININE 2.2*   CALCIUM 8.5*   MG 1.5*     Lab Results   Component Value Date    CALCIUM 8.5 (L) 01/12/2020    PHOS 3.1 07/26/2019     BNP  No results for input(s): BNP, BNPTRIAGEBLO in the last 168 hours.No results found for: URICACID  Lab Results   Component Value Date    IRON 72 01/11/2020    TIBC 237 (L) 01/11/2020    FERRITIN 260 01/11/2020     Lab Results   Component Value Date    CALCIUM 8.5 (L) 01/12/2020    PHOS 3.1 07/26/2019       Diagnostic Results:  CT Abdomen Pelvis  Without Contrast   Final Result      Stigmata of  cirrhosis, including varices and splenomegaly.  Hepatic steatosis.      Colonic diverticulosis.  Prominent thickening of the wall of the decompressed descending colon.  Findings may represent early or resolved diverticulitis.      Mild prostatomegaly.      AVN of the left hip.      Small hiatal hernia.         Electronically signed by: Britta Ma   Date:    01/09/2020   Time:    00:59          ASSESSMENT/PLAN:     1. Slowly resolving Non-Oliguric acute kidney injury on normal kidney function secondary to volume depletion and early ATN from nausea, vomiting, diarrhea and poor oral intake (N17.9, N17.0):  See HPI.  Denies NSAIDS.  Pt known to us from last year with ATN following volume depletion and hypotension after ETOH.  CT abd w/o obstruction.  Agree with aggressive IVF's as now.  Consider changing PPI to H2 blocker if able.  Will follow trends.  Cr 2.2 today. Stool culture pending.Urine culture neg. Changed IVFs to LR and will continue again today to complete current bag then stop since he is taking adequate po now.  Renally dose meds, avoid nephrotoxins, and monitor I/O's closely.  2. Hypokalemia/ Hypomagnesemia: Replace both, check phos.  3. N/VD early diverticulitis/ Pancreatitis:  Defer to GI.  Renally dose antibx.  Lipase markedly elevated, LFTs improving.  4. Anemia of GI loss:  Monitor H/H. Fairly stable over last few days  5. Thrombocytopenia from cirrhosis vs other:  Improving.Defer.       See above  Will follow along.

## 2020-01-13 VITALS
DIASTOLIC BLOOD PRESSURE: 85 MMHG | HEART RATE: 76 BPM | OXYGEN SATURATION: 98 % | TEMPERATURE: 98 F | HEIGHT: 72 IN | BODY MASS INDEX: 24.85 KG/M2 | RESPIRATION RATE: 16 BRPM | WEIGHT: 183.44 LBS | SYSTOLIC BLOOD PRESSURE: 151 MMHG

## 2020-01-13 PROBLEM — K62.5 RECTAL BLEEDING: Status: RESOLVED | Noted: 2020-01-09 | Resolved: 2020-01-13

## 2020-01-13 LAB
ALBUMIN SERPL BCP-MCNC: 3.3 G/DL (ref 3.5–5.2)
ALBUMIN SERPL BCP-MCNC: 3.3 G/DL (ref 3.5–5.2)
ALP SERPL-CCNC: 101 U/L (ref 55–135)
ALP SERPL-CCNC: 102 U/L (ref 55–135)
ALT SERPL W/O P-5'-P-CCNC: 36 U/L (ref 10–44)
ALT SERPL W/O P-5'-P-CCNC: 38 U/L (ref 10–44)
AMPHET+METHAMPHET UR QL: NEGATIVE
ANION GAP SERPL CALC-SCNC: 10 MMOL/L (ref 8–16)
AST SERPL-CCNC: 71 U/L (ref 10–40)
AST SERPL-CCNC: 74 U/L (ref 10–40)
BARBITURATES UR QL SCN>200 NG/ML: NORMAL
BASOPHILS # BLD AUTO: 0.03 K/UL (ref 0–0.2)
BASOPHILS NFR BLD: 0.3 % (ref 0–1.9)
BENZODIAZ UR QL SCN>200 NG/ML: NORMAL
BILIRUB DIRECT SERPL-MCNC: 3.1 MG/DL (ref 0.1–0.3)
BILIRUB SERPL-MCNC: 6.3 MG/DL (ref 0.1–1)
BILIRUB SERPL-MCNC: 6.3 MG/DL (ref 0.1–1)
BUN SERPL-MCNC: 16 MG/DL (ref 6–20)
BZE UR QL SCN: NEGATIVE
CALCIUM SERPL-MCNC: 9.2 MG/DL (ref 8.7–10.5)
CANNABINOIDS UR QL SCN: NORMAL
CHLORIDE SERPL-SCNC: 103 MMOL/L (ref 95–110)
CO2 SERPL-SCNC: 20 MMOL/L (ref 23–29)
CREAT SERPL-MCNC: 2 MG/DL (ref 0.5–1.4)
CREAT UR-MCNC: 264.4 MG/DL (ref 23–375)
DIFFERENTIAL METHOD: ABNORMAL
EOSINOPHIL # BLD AUTO: 0 K/UL (ref 0–0.5)
EOSINOPHIL NFR BLD: 0.1 % (ref 0–8)
ERYTHROCYTE [DISTWIDTH] IN BLOOD BY AUTOMATED COUNT: 16.6 % (ref 11.5–14.5)
EST. GFR  (AFRICAN AMERICAN): 45 ML/MIN/1.73 M^2
EST. GFR  (NON AFRICAN AMERICAN): 39 ML/MIN/1.73 M^2
ETHANOL UR-MCNC: <10 MG/DL
GLUCOSE SERPL-MCNC: 137 MG/DL (ref 70–110)
HCT VFR BLD AUTO: 36.1 % (ref 40–54)
HGB BLD-MCNC: 12.2 G/DL (ref 14–18)
HIV 1+2 AB+HIV1 P24 AG SERPL QL IA: NEGATIVE
IMM GRANULOCYTES # BLD AUTO: 0.04 K/UL (ref 0–0.04)
IMM GRANULOCYTES NFR BLD AUTO: 0.5 % (ref 0–0.5)
LYMPHOCYTES # BLD AUTO: 1.7 K/UL (ref 1–4.8)
LYMPHOCYTES NFR BLD: 19.1 % (ref 18–48)
MAGNESIUM SERPL-MCNC: 2 MG/DL (ref 1.6–2.6)
MCH RBC QN AUTO: 36 PG (ref 27–31)
MCHC RBC AUTO-ENTMCNC: 33.8 G/DL (ref 32–36)
MCV RBC AUTO: 107 FL (ref 82–98)
METHADONE UR QL SCN>300 NG/ML: NORMAL
MONOCYTES # BLD AUTO: 1 K/UL (ref 0.3–1)
MONOCYTES NFR BLD: 11.5 % (ref 4–15)
NEUTROPHILS # BLD AUTO: 5.9 K/UL (ref 1.8–7.7)
NEUTROPHILS NFR BLD: 68.5 % (ref 38–73)
NRBC BLD-RTO: 0 /100 WBC
OPIATES UR QL SCN: NORMAL
PCP UR QL SCN>25 NG/ML: NORMAL
PLATELET # BLD AUTO: 106 K/UL (ref 150–350)
PMV BLD AUTO: 12.1 FL (ref 9.2–12.9)
POTASSIUM SERPL-SCNC: 3.6 MMOL/L (ref 3.5–5.1)
PROT SERPL-MCNC: 9.4 G/DL (ref 6–8.4)
PROT SERPL-MCNC: 9.5 G/DL (ref 6–8.4)
RBC # BLD AUTO: 3.39 M/UL (ref 4.6–6.2)
SODIUM SERPL-SCNC: 133 MMOL/L (ref 136–145)
TOXICOLOGY INFORMATION: NORMAL
TSH SERPL DL<=0.005 MIU/L-ACNC: 1.5 UIU/ML (ref 0.4–4)
WBC # BLD AUTO: 8.67 K/UL (ref 3.9–12.7)

## 2020-01-13 PROCEDURE — 84443 ASSAY THYROID STIM HORMONE: CPT

## 2020-01-13 PROCEDURE — 93010 EKG 12-LEAD: ICD-10-PCS | Mod: ,,, | Performed by: INTERNAL MEDICINE

## 2020-01-13 PROCEDURE — 93010 ELECTROCARDIOGRAM REPORT: CPT | Mod: ,,, | Performed by: INTERNAL MEDICINE

## 2020-01-13 PROCEDURE — 63600175 PHARM REV CODE 636 W HCPCS: Performed by: PHYSICIAN ASSISTANT

## 2020-01-13 PROCEDURE — 80053 COMPREHEN METABOLIC PANEL: CPT

## 2020-01-13 PROCEDURE — 25000003 PHARM REV CODE 250: Performed by: PHYSICIAN ASSISTANT

## 2020-01-13 PROCEDURE — 80307 DRUG TEST PRSMV CHEM ANLYZR: CPT

## 2020-01-13 PROCEDURE — 86703 HIV-1/HIV-2 1 RESULT ANTBDY: CPT

## 2020-01-13 PROCEDURE — 93005 ELECTROCARDIOGRAM TRACING: CPT

## 2020-01-13 PROCEDURE — 99239 HOSP IP/OBS DSCHRG MGMT >30: CPT | Mod: ,,, | Performed by: INTERNAL MEDICINE

## 2020-01-13 PROCEDURE — 80076 HEPATIC FUNCTION PANEL: CPT

## 2020-01-13 PROCEDURE — 36415 COLL VENOUS BLD VENIPUNCTURE: CPT

## 2020-01-13 PROCEDURE — S0030 INJECTION, METRONIDAZOLE: HCPCS | Performed by: PHYSICIAN ASSISTANT

## 2020-01-13 PROCEDURE — 83735 ASSAY OF MAGNESIUM: CPT

## 2020-01-13 PROCEDURE — 99239 PR HOSPITAL DISCHARGE DAY,>30 MIN: ICD-10-PCS | Mod: ,,, | Performed by: INTERNAL MEDICINE

## 2020-01-13 PROCEDURE — C9113 INJ PANTOPRAZOLE SODIUM, VIA: HCPCS | Performed by: PHYSICIAN ASSISTANT

## 2020-01-13 PROCEDURE — 85025 COMPLETE CBC W/AUTO DIFF WBC: CPT

## 2020-01-13 RX ORDER — METRONIDAZOLE 500 MG/1
500 TABLET ORAL EVERY 6 HOURS
Qty: 20 TABLET | Refills: 0 | Status: SHIPPED | OUTPATIENT
Start: 2020-01-13 | End: 2020-01-14

## 2020-01-13 RX ORDER — AMLODIPINE BESYLATE 5 MG/1
5 TABLET ORAL DAILY
Qty: 30 TABLET | Refills: 1 | Status: ON HOLD | OUTPATIENT
Start: 2020-01-13 | End: 2020-04-11 | Stop reason: SDUPTHER

## 2020-01-13 RX ORDER — CIPROFLOXACIN 500 MG/1
500 TABLET ORAL EVERY 12 HOURS
Qty: 10 TABLET | Refills: 0 | Status: SHIPPED | OUTPATIENT
Start: 2020-01-13 | End: 2020-01-14

## 2020-01-13 RX ORDER — AMLODIPINE BESYLATE 5 MG/1
5 TABLET ORAL DAILY
Status: DISCONTINUED | OUTPATIENT
Start: 2020-01-13 | End: 2020-01-13 | Stop reason: HOSPADM

## 2020-01-13 RX ADMIN — HYDRALAZINE HYDROCHLORIDE 25 MG: 25 TABLET, FILM COATED ORAL at 04:01

## 2020-01-13 RX ADMIN — PANTOPRAZOLE SODIUM 40 MG: 40 INJECTION, POWDER, LYOPHILIZED, FOR SOLUTION INTRAVENOUS at 10:01

## 2020-01-13 RX ADMIN — METRONIDAZOLE 500 MG: 500 INJECTION, SOLUTION INTRAVENOUS at 01:01

## 2020-01-13 RX ADMIN — CIPROFLOXACIN 400 MG: 2 INJECTION, SOLUTION INTRAVENOUS at 02:01

## 2020-01-13 RX ADMIN — PROPRANOLOL HYDROCHLORIDE 10 MG: 10 TABLET ORAL at 10:01

## 2020-01-13 RX ADMIN — METRONIDAZOLE 500 MG: 500 INJECTION, SOLUTION INTRAVENOUS at 10:01

## 2020-01-13 NOTE — DISCHARGE INSTRUCTIONS
I added Amlodipine 5mg to help control your blood pressure better.  This may need to be increased when you see your PCP if your blood pressure is still not well controlled.  Continue your Propranolol as well.    Please follow up with Dr. Samson (Nephrology) in 2 weeks.  You needs labs prior to that visit.    Please follow up with your PCP in 1-2 weeks.

## 2020-01-13 NOTE — NURSING
"Contacted WINNIE Uribe regarding the pt complaining of visual hallucinations.  Informed PA that the pt is AAOx4 and that he stated he has been "seeing things" since Sunday (1/12).  Informed PA that the pt's trazodone is scheduled nightly and the pt states he only takes trazodone once a week.  PA stated to d/c trazodone and report any abnormal labs.  Will continue to monitor.   "

## 2020-01-13 NOTE — PLAN OF CARE
AAOX4. VSS.Pt free of trauma, falls, injury and skin breakdown. Pt denies pain at this time.LR infusing @125ml/hr, but will d/c IV fluids after current bag is complete per Dr Samson. Pt has been eating and voiding(via urinal) adequately throughout shift.Pt ambulates and repositions self independently. Treated w/ IV Abx as ordered.Purposeful hourly rounding. Pt has call light in reach, side rails up X2, bed in low position, SCDs and nonskid socks on. Pt lying in bed in no distress. Will continue to monitor.

## 2020-01-13 NOTE — PLAN OF CARE
Pt family to provide transportation home.     Followup with Dr. Samson scheduled for Feb 10 at 11:30 am.     No DC needs from  perspective.       01/13/20 1148   Final Note   Assessment Type Final Discharge Note   Anticipated Discharge Disposition Home   What phone number can be called within the next 1-3 days to see how you are doing after discharge? [phone]   Hospital Follow Up  Appt(s) scheduled? Yes   Discharge plans and expectations educations in teach back method with documentation complete? Yes   Right Care Referral Info   Post Acute Recommendation No Care

## 2020-01-13 NOTE — PLAN OF CARE
Assessed pt for spiritual distress - none reported nor presented.   Pt felt good about upcoming discharge.  Future spiritual care offered upon request.

## 2020-01-13 NOTE — PLAN OF CARE
AAOx4.  NAD noted.  Pt remained free from injury or falls.  Pt remains free from skin breakdowns. Vitals signs stable throughout shift on RA.  Positions self independently.  Pt ambulated in horan x2. Voiding adequately throughout shift.  Pt has no complaints of pain this shift.  Pt remained afebrile this shift.  Pt tolerating regular diet.  Pt able to voice needs and concerns.  Purposeful rounding done.  All needs met.  Bed locked in lowest position, call bell in reach.  Will continue to monitor.

## 2020-01-13 NOTE — NURSING
Eager & in agreement w/ DC. VU of DC instructions--paperwork passed & explained, scripts called to pharm per MD.  IV removed w/ cath tip intact, WNL. To be DCd home w/ family-- pt refused transport and walked off unit once given d/c paperwork. Free from falls, injury, or skin breakdown this hospital admission.  Hourly rounding performed this shift.

## 2020-01-13 NOTE — PROGRESS NOTES
Progress Note  Nephrology    Consult Requested By: Roberto Becker MD  Reason for Consult: ENA    SUBJECTIVE:     History of Present Illness:  Patient is a 44 y.o. male presents with 2 days of nausea, vomiting, and copious diarrhea.  Evaluation emergency room revealed patient to have acute kidney injury with creatinine of 2.8.  Radiologic studies showing evidence of early diverticulitis and patient admitted and started on IV antibiotics.  Was only given 1 L IV fluids.  This morning creatinine up to 2.9 and consulted for evaluation.    Case discussed with team.  Patient seen and examined.  Known to us from last year following episode of acute kidney injury from volume depletion.  Patient feeling better and has less diarrhea.  Eager to try to eat some solid food.  Urine remains dark jess.  Denies any NSAIDs.  Currently without chest pain, shortness of breath, nausea, vomiting, diarrhea, fever, chills.      Epic reviewed.    Interval History:    Pt again feeling much better today. Only one formed stool today. Denies abd pain. No vomiting and is eating and drinking. Good UOP.    Past Medical History:   Diagnosis Date    Alcoholic cirrhosis of liver     Arthritis     Hypertension     Pulmonary embolism      Past Surgical History:   Procedure Laterality Date    COLON SURGERY      ESOPHAGOGASTRODUODENOSCOPY N/A 7/26/2019    Procedure: EGD (ESOPHAGOGASTRODUODENOSCOPY);  Surgeon: Brian Trivedi MD;  Location: [facility];  Service: Endoscopy;  Laterality: N/A;    HERNIA REPAIR       History reviewed. No pertinent family history.  Social History     Tobacco Use    Smoking status: Never Smoker   Substance Use Topics    Alcohol use: Not Currently     Comment: freq    Drug use: Yes     Types: Marijuana       Review of patient's allergies indicates:   Allergen Reactions    Morphine Itching        Review of Systems:  Constitutional: No fever or chills  Respiratory: No cough or shortness of breath  Cardiovascular: No  chest pain or palpitations  Gastrointestinal: No nausea or vomiting  Neurological: No confusion or weakness    OBJECTIVE:     Vital Signs (Most Recent)  Temp: 98.2 °F (36.8 °C) (01/13/20 0737)  Pulse: 76 (01/13/20 0737)  Resp: 16 (01/13/20 0737)  BP: (!) 151/85 (01/13/20 0737)  SpO2: 98 % (01/13/20 0737)    Vital Signs Range (Last 24H):  Temp:  [98.1 °F (36.7 °C)-98.7 °F (37.1 °C)]   Pulse:  [71-87]   Resp:  [16-20]   BP: (113-188)/()   SpO2:  [96 %-99 %]       Intake/Output Summary (Last 24 hours) at 1/13/2020 1123  Last data filed at 1/13/2020 0600  Gross per 24 hour   Intake 1260 ml   Output 1125 ml   Net 135 ml       Physical Exam:  General appearance: Well developed, well nourished  Eyes:  Conjunctivae/corneas clear. PERRL.  Lungs: Normal respiratory effort,   clear to auscultation bilaterally   Heart: Regular rate and rhythm, S1, S2 normal, no murmur, rub or terence.  Abdomen: Soft, semi-tender non-distended; bowel sounds normal; no masses,  no organomegaly  Extremities: No cyanosis or clubbing. No edema.    Skin: Skin color, texture, turgor normal. No rashes or lesions  Neurologic: Normal strength and tone. No focal numbness or weakness       Laboratory:  Recent Labs   Lab 01/13/20 0408   WBC 8.67   RBC 3.39*   HGB 12.2*   HCT 36.1*   *   *   MCH 36.0*   MCHC 33.8     BMP:   Recent Labs   Lab 01/13/20  0408 01/13/20  0555   *  --    *  --    K 3.6  --      --    CO2 20*  --    BUN 16  --    CREATININE 2.0*  --    CALCIUM 9.2  --    MG  --  2.0     Lab Results   Component Value Date    CALCIUM 9.2 01/13/2020    PHOS 2.7 01/12/2020     BNP  No results for input(s): BNP, BNPTRIAGEBLO in the last 168 hours.No results found for: URICACID  Lab Results   Component Value Date    IRON 72 01/11/2020    TIBC 237 (L) 01/11/2020    FERRITIN 260 01/11/2020     Lab Results   Component Value Date    CALCIUM 9.2 01/13/2020    PHOS 2.7 01/12/2020       Diagnostic Results:  CT Abdomen  Pelvis  Without Contrast   Final Result      Stigmata of cirrhosis, including varices and splenomegaly.  Hepatic steatosis.      Colonic diverticulosis.  Prominent thickening of the wall of the decompressed descending colon.  Findings may represent early or resolved diverticulitis.      Mild prostatomegaly.      AVN of the left hip.      Small hiatal hernia.         Electronically signed by: Britta Ma   Date:    01/09/2020   Time:    00:59          ASSESSMENT/PLAN:     1. Slowly resolving Non-Oliguric acute kidney injury on normal kidney function secondary to volume depletion and early ATN from nausea, vomiting, diarrhea and poor oral intake (N17.9, N17.0):  See HPI.  Denies NSAIDS.  Pt known to us from last year with ATN following volume depletion and hypotension after ETOH.  CT abd w/o obstruction.  Responded to aggressive IVF's, now on oral fluids.  Consider changing PPI to H2 blocker if able.  Will follow trend outpt.  Cr 2.0 today. Stool culture NGTD.Urine culture neg.  Renally dose meds, avoid nephrotoxins, and monitor I/O's closely.  2. Hypokalemia/ Hypomagnesemia: Replace both, check phos.  3. N/VD early diverticulitis/ Pancreatitis:  Defer to GI.  Renally dose antibx.  Lipase markedly elevated, LFTs improving.  4. Anemia of GI loss:  Monitor H/H. Fairly stable over last few days  5. Thrombocytopenia from cirrhosis vs other:  Improving.Defer.     Okay for discharge from renal standpoint. Will need follow up in my clinic within 10-14 day with CMP and CBC a few days prior to OV  Case discussed with Dr. Becker

## 2020-01-13 NOTE — PLAN OF CARE
"Patient noted to be having hallucinations. RN relays the patient described these hallucinations as "vivid dreams." He states he normally only takes trazodone once weekly, and has been having it nightly since arrival. Verbal order to d/c trazodone. AM labs drawn, will monitor for any abnormalities.  Also, will add hepatic function panel, TSH, UDS, B12 level and HIV test to evaluate for other non-medicinal causes.   "

## 2020-01-14 ENCOUNTER — NURSE TRIAGE (OUTPATIENT)
Dept: ADMINISTRATIVE | Facility: CLINIC | Age: 45
End: 2020-01-14

## 2020-01-14 LAB — BACTERIA STL CULT: NORMAL

## 2020-01-14 NOTE — TELEPHONE ENCOUNTER
"  Mother advised to dial 911  Patient is displaying hallucinating symptoms of seeing people and it is worse now.  Reason for Disposition   Caller has urgent medication question about med that PCP prescribed and triager unable to answer question    Additional Information   Negative: Caller requesting information not related to medicine   Negative: Drug overdose and nurse unable to answer question   Negative: Caller requesting a prescription for Strep throat and has a positive culture result   Negative: Rash while taking a medication or within 3 days of stopping it   Negative: Immunization reaction suspected   Negative: [1] Asthma AND [2] having symptoms of asthma (cough, wheezing, etc)   Negative: MORE THAN A DOUBLE DOSE of a prescription or over-the-counter (OTC) drug   Negative: [1] DOUBLE DOSE (an extra dose or lesser amount) of over-the-counter (OTC) drug AND [2] any symptoms (e.g., dizziness, nausea, pain, sleepiness)   Negative: [1] DOUBLE DOSE (an extra dose or lesser amount) of prescription drug AND [2] any symptoms (e.g., dizziness, nausea, pain, sleepiness)   Negative: Took another person's prescription drug   Negative: [1] DOUBLE DOSE (an extra dose or lesser amount) of prescription drug AND [2] NO symptoms (Exception: a double dose of antibiotics)   Negative: Diabetes drug error or overdose (e.g., insulin or extra dose)   Negative: [1] Request for URGENT new prescription or refill of "essential" medication (i.e., likelihood of harm to patient if not taken) AND [2] triager unable to fill per unit policy   Negative: [1] Prescription not at pharmacy AND [2] was prescribed today by PCP   Negative: Pharmacy calling with prescription questions and triager unable to answer question    Protocols used: MEDICATION QUESTION CALL-A-AH      "

## 2020-01-14 NOTE — NURSING
Remains free from fall, injury, and skin breakdown. Ambulates with assist to bathroom and urinal at bedside. VSS stable on RA and afebrile. Positions self independently. Pain controlled with PO Tylenol. Neuro checks WDL. SCDs maintained.Tolerating ordered diet. IV site WNL. No other complaints of itching this shift after dose of PO Benadryl given. Plan of care reviewed with patient and all questions answered. Bed low, locked w/ bed alarm on. Call light within reach. Purposeful rounding performed. No other complaints at this time.

## 2020-01-15 ENCOUNTER — PATIENT OUTREACH (OUTPATIENT)
Dept: ADMINISTRATIVE | Facility: CLINIC | Age: 45
End: 2020-01-15

## 2020-01-15 NOTE — PROGRESS NOTES
C3 nurse attempted to contact patient. No answer. The following message was left for the patient to return the call:  Good Morning  I am a nurse calling on behalf of [facility] from the Care Coordination [facility].  This is a Transitional Care Call for Irvin Diaz . When you have a moment please contact us at [phone] or [phone] Monday through Friday, between the hours of 8 am to 4 pm. We look forward to speaking with you. On behalf of [facility] have a nice day.    The patient does not have a scheduled HOSFU appointment within 7-14 days post hospital discharge date 01/13/2020, Non [facility] PCP[facility] appointment scheduling.

## 2020-01-16 NOTE — PHYSICIAN QUERY
PT Name: Irvin Diaz  MR #: [id number]     Physician Query Form - Etiology of Condition Clarification      CDS Jodie Matamoros, RN, BSN        Contact Information:    [email or web address]         This form is a permanent document in the medical record.     Query Date: January 16, 2020    By submitting this query, we are merely seeking further clarification of documentation.  Please utilize your independent clinical judgment when addressing the question(s) below.     The medical record contains the following:    Findings Supporting Clinical Information Location in Medical Record   Principal Problem:Rectal bleeding 44 y.o. male, with PMH of UGIB, alcoholic cirrhosis, esophageal varicies (s/p banding x 2, on propranolol), previously resected benign colonic lesion, diverticulosis per colonoscopy 1 year ago, who presented to [facility] ED on 1/8/20 with left sided abdominal pain, frequent stooling and passage of dark red blood twice just PTA.  Gastrointestinal: Positive for abdominal pain, blood in stool, diarrhea and nausea.      Stigmata of cirrhosis, including varices and splenomegaly.  Hepatic steatosis.    Colonic diverticulosis.  Prominent thickening of the wall of the decompressed descending colon.  Findings may represent early or resolved diverticulitis.    Suspect diverticulitis based on CT findings with limited rectal bleeding - brown stool noted on ER rectal exam.  - cipro/flagyl  - monitor serial H/H and for evidence of ongoing overt bleeding (low suspicion for variceal bleeding with low BUN)  - continue beta blocker and PPI  - no plans for endoscopy for now unless ongoing blood loss      Patient is overall improved with resolution of abdominal pain and rectal bleeding on 1/10/2020.   Diet advanced and tolerated well.  Lipase again elevated, but pain free and tolerating diet.  Will complete a 10 day course of Cipro/Flagyl for Diverticulitis on discharge.    1/9 H&P                  1/9 CT              1/9 GI  consult                 Discharge summary      Please document your best medical opinion regarding the etiology of   Rectal bleeding    for which treatment is/was directed.       x  _____   Diverticulitis of large intestines without perforation or abscess with                  Bleeding     _____   Other (please specify) _______________________________________       [  ] Clinically Undetermined     Please document in your progress notes daily for the duration of treatment, until resolved, and include in your discharge summary.

## 2020-01-28 LAB — ADAMTS13 ACTIVITY: NORMAL %

## 2020-04-08 ENCOUNTER — ANESTHESIA EVENT (OUTPATIENT)
Dept: ENDOSCOPY | Facility: OTHER | Age: 45
DRG: 432 | End: 2020-04-08
Payer: MEDICAID

## 2020-04-08 ENCOUNTER — HOSPITAL ENCOUNTER (INPATIENT)
Facility: OTHER | Age: 45
LOS: 3 days | Discharge: HOME | DRG: 432 | End: 2020-04-11
Attending: EMERGENCY MEDICINE | Admitting: HOSPITALIST
Payer: MEDICAID

## 2020-04-08 ENCOUNTER — ANESTHESIA (OUTPATIENT)
Dept: ENDOSCOPY | Facility: OTHER | Age: 45
DRG: 432 | End: 2020-04-08
Payer: MEDICAID

## 2020-04-08 DIAGNOSIS — D69.6 THROMBOCYTOPENIA: ICD-10-CM

## 2020-04-08 DIAGNOSIS — D53.9 MACROCYTIC ANEMIA: ICD-10-CM

## 2020-04-08 DIAGNOSIS — I85.11 SECONDARY ESOPHAGEAL VARICES WITH BLEEDING: ICD-10-CM

## 2020-04-08 DIAGNOSIS — K92.2 ACUTE UPPER GI BLEED: ICD-10-CM

## 2020-04-08 DIAGNOSIS — R57.1 HYPOVOLEMIC SHOCK: ICD-10-CM

## 2020-04-08 DIAGNOSIS — K70.30 ALCOHOLIC CIRRHOSIS, UNSPECIFIED WHETHER ASCITES PRESENT: ICD-10-CM

## 2020-04-08 DIAGNOSIS — K92.2 GASTROINTESTINAL HEMORRHAGE, UNSPECIFIED GASTROINTESTINAL HEMORRHAGE TYPE: ICD-10-CM

## 2020-04-08 DIAGNOSIS — N18.30 CKD (CHRONIC KIDNEY DISEASE) STAGE 3, GFR 30-59 ML/MIN: ICD-10-CM

## 2020-04-08 DIAGNOSIS — N17.9 AKI (ACUTE KIDNEY INJURY): ICD-10-CM

## 2020-04-08 DIAGNOSIS — I26.99 PULMONARY EMBOLISM, UNSPECIFIED CHRONICITY, UNSPECIFIED PULMONARY EMBOLISM TYPE, UNSPECIFIED WHETHER ACUTE COR PULMONALE PRESENT: ICD-10-CM

## 2020-04-08 DIAGNOSIS — K70.30 ALCOHOLIC CIRRHOSIS OF LIVER WITHOUT ASCITES: Primary | ICD-10-CM

## 2020-04-08 DIAGNOSIS — I10 ESSENTIAL HYPERTENSION: ICD-10-CM

## 2020-04-08 DIAGNOSIS — D62 ACUTE BLOOD LOSS ANEMIA: ICD-10-CM

## 2020-04-08 DIAGNOSIS — R00.0 TACHYCARDIA: ICD-10-CM

## 2020-04-08 DIAGNOSIS — R79.89 ELEVATED TROPONIN: ICD-10-CM

## 2020-04-08 PROBLEM — R74.01 TRANSAMINITIS: Status: ACTIVE | Noted: 2019-07-25

## 2020-04-08 PROBLEM — E87.29 HIGH ANION GAP METABOLIC ACIDOSIS: Status: ACTIVE | Noted: 2020-04-08

## 2020-04-08 PROBLEM — M16.10 HIP ARTHRITIS: Status: ACTIVE | Noted: 2020-04-08

## 2020-04-08 LAB
ABO + RH BLD: NORMAL
ALBUMIN SERPL BCP-MCNC: 3.3 G/DL (ref 3.5–5.2)
ALP SERPL-CCNC: 100 U/L (ref 55–135)
ALT SERPL W/O P-5'-P-CCNC: 48 U/L (ref 10–44)
ANION GAP SERPL CALC-SCNC: 27 MMOL/L (ref 8–16)
APTT BLDCRRT: 32 SEC (ref 21–32)
AST SERPL-CCNC: 119 U/L (ref 10–40)
BASOPHILS # BLD AUTO: 0.02 K/UL (ref 0–0.2)
BASOPHILS # BLD AUTO: 0.03 K/UL (ref 0–0.2)
BASOPHILS # BLD AUTO: 0.07 K/UL (ref 0–0.2)
BASOPHILS NFR BLD: 0.2 % (ref 0–1.9)
BASOPHILS NFR BLD: 0.3 % (ref 0–1.9)
BASOPHILS NFR BLD: 0.6 % (ref 0–1.9)
BILIRUB SERPL-MCNC: 4.9 MG/DL (ref 0.1–1)
BLD GP AB SCN CELLS X3 SERPL QL: NORMAL
BLD PROD TYP BPU: NORMAL
BLOOD UNIT EXPIRATION DATE: NORMAL
BLOOD UNIT TYPE CODE: 5100
BLOOD UNIT TYPE: NORMAL
BUN SERPL-MCNC: 13 MG/DL (ref 6–20)
CALCIUM SERPL-MCNC: 9.3 MG/DL (ref 8.7–10.5)
CHLORIDE SERPL-SCNC: 97 MMOL/L (ref 95–110)
CO2 SERPL-SCNC: 12 MMOL/L (ref 23–29)
CODING SYSTEM: NORMAL
CREAT SERPL-MCNC: 1.7 MG/DL (ref 0.5–1.4)
DIFFERENTIAL METHOD: ABNORMAL
DISPENSE STATUS: NORMAL
EOSINOPHIL # BLD AUTO: 0 K/UL (ref 0–0.5)
EOSINOPHIL # BLD AUTO: 0.1 K/UL (ref 0–0.5)
EOSINOPHIL # BLD AUTO: 0.1 K/UL (ref 0–0.5)
EOSINOPHIL NFR BLD: 0.1 % (ref 0–8)
EOSINOPHIL NFR BLD: 0.5 % (ref 0–8)
EOSINOPHIL NFR BLD: 0.6 % (ref 0–8)
ERYTHROCYTE [DISTWIDTH] IN BLOOD BY AUTOMATED COUNT: 13.5 % (ref 11.5–14.5)
ERYTHROCYTE [DISTWIDTH] IN BLOOD BY AUTOMATED COUNT: 13.8 % (ref 11.5–14.5)
ERYTHROCYTE [DISTWIDTH] IN BLOOD BY AUTOMATED COUNT: 17.4 % (ref 11.5–14.5)
EST. GFR  (AFRICAN AMERICAN): 55 ML/MIN/1.73 M^2
EST. GFR  (NON AFRICAN AMERICAN): 48 ML/MIN/1.73 M^2
ETHANOL SERPL-MCNC: <10 MG/DL
GLUCOSE SERPL-MCNC: 57 MG/DL (ref 70–110)
HCT VFR BLD AUTO: 24 % (ref 40–54)
HCT VFR BLD AUTO: 24.2 % (ref 40–54)
HCT VFR BLD AUTO: 29 % (ref 40–54)
HGB BLD-MCNC: 7.7 G/DL (ref 14–18)
HGB BLD-MCNC: 7.9 G/DL (ref 14–18)
HGB BLD-MCNC: 9.5 G/DL (ref 14–18)
IMM GRANULOCYTES # BLD AUTO: 0.03 K/UL (ref 0–0.04)
IMM GRANULOCYTES # BLD AUTO: 0.04 K/UL (ref 0–0.04)
IMM GRANULOCYTES # BLD AUTO: 0.04 K/UL (ref 0–0.04)
IMM GRANULOCYTES NFR BLD AUTO: 0.3 % (ref 0–0.5)
IMM GRANULOCYTES NFR BLD AUTO: 0.4 % (ref 0–0.5)
IMM GRANULOCYTES NFR BLD AUTO: 0.4 % (ref 0–0.5)
INR PPP: 1.3 (ref 0.8–1.2)
LACTATE SERPL-SCNC: 8.6 MMOL/L (ref 0.5–2.2)
LYMPHOCYTES # BLD AUTO: 0.7 K/UL (ref 1–4.8)
LYMPHOCYTES # BLD AUTO: 1.4 K/UL (ref 1–4.8)
LYMPHOCYTES # BLD AUTO: 4.1 K/UL (ref 1–4.8)
LYMPHOCYTES NFR BLD: 11.9 % (ref 18–48)
LYMPHOCYTES NFR BLD: 38 % (ref 18–48)
LYMPHOCYTES NFR BLD: 7.3 % (ref 18–48)
MCH RBC QN AUTO: 35.4 PG (ref 27–31)
MCH RBC QN AUTO: 36.7 PG (ref 27–31)
MCH RBC QN AUTO: 36.8 PG (ref 27–31)
MCHC RBC AUTO-ENTMCNC: 31.8 G/DL (ref 32–36)
MCHC RBC AUTO-ENTMCNC: 32.8 G/DL (ref 32–36)
MCHC RBC AUTO-ENTMCNC: 32.9 G/DL (ref 32–36)
MCV RBC AUTO: 108 FL (ref 82–98)
MCV RBC AUTO: 112 FL (ref 82–98)
MCV RBC AUTO: 115 FL (ref 82–98)
MONOCYTES # BLD AUTO: 0.8 K/UL (ref 0.3–1)
MONOCYTES # BLD AUTO: 0.8 K/UL (ref 0.3–1)
MONOCYTES # BLD AUTO: 1 K/UL (ref 0.3–1)
MONOCYTES NFR BLD: 6.9 % (ref 4–15)
MONOCYTES NFR BLD: 7.7 % (ref 4–15)
MONOCYTES NFR BLD: 9.1 % (ref 4–15)
NEUTROPHILS # BLD AUTO: 5.8 K/UL (ref 1.8–7.7)
NEUTROPHILS # BLD AUTO: 8.5 K/UL (ref 1.8–7.7)
NEUTROPHILS # BLD AUTO: 8.9 K/UL (ref 1.8–7.7)
NEUTROPHILS NFR BLD: 53.6 % (ref 38–73)
NEUTROPHILS NFR BLD: 78.3 % (ref 38–73)
NEUTROPHILS NFR BLD: 83.8 % (ref 38–73)
NRBC BLD-RTO: 0 /100 WBC
PLATELET # BLD AUTO: 60 K/UL (ref 150–350)
PLATELET # BLD AUTO: 65 K/UL (ref 150–350)
PLATELET # BLD AUTO: 80 K/UL (ref 150–350)
PMV BLD AUTO: 11.4 FL (ref 9.2–12.9)
PMV BLD AUTO: 11.6 FL (ref 9.2–12.9)
PMV BLD AUTO: 11.8 FL (ref 9.2–12.9)
POCT GLUCOSE: 54 MG/DL (ref 70–110)
POTASSIUM SERPL-SCNC: 3.8 MMOL/L (ref 3.5–5.1)
PROT SERPL-MCNC: 8.8 G/DL (ref 6–8.4)
PROTHROMBIN TIME: 14.1 SEC (ref 9–12.5)
RBC # BLD AUTO: 2.1 M/UL (ref 4.6–6.2)
RBC # BLD AUTO: 2.23 M/UL (ref 4.6–6.2)
RBC # BLD AUTO: 2.58 M/UL (ref 4.6–6.2)
SARS-COV-2 RDRP RESP QL NAA+PROBE: NEGATIVE
SODIUM SERPL-SCNC: 136 MMOL/L (ref 136–145)
TRANS ERYTHROCYTES VOL PATIENT: NORMAL ML
TROPONIN I SERPL DL<=0.01 NG/ML-MCNC: 0.04 NG/ML (ref 0–0.03)
TROPONIN I SERPL DL<=0.01 NG/ML-MCNC: 0.04 NG/ML (ref 0–0.03)
TROPONIN I SERPL DL<=0.01 NG/ML-MCNC: 0.05 NG/ML (ref 0–0.03)
WBC # BLD AUTO: 10.09 K/UL (ref 3.9–12.7)
WBC # BLD AUTO: 10.86 K/UL (ref 3.9–12.7)
WBC # BLD AUTO: 11.32 K/UL (ref 3.9–12.7)

## 2020-04-08 PROCEDURE — 80053 COMPREHEN METABOLIC PANEL: CPT

## 2020-04-08 PROCEDURE — 11000001 HC ACUTE MED/SURG PRIVATE ROOM

## 2020-04-08 PROCEDURE — 37000009 HC ANESTHESIA EA ADD 15 MINS: Performed by: INTERNAL MEDICINE

## 2020-04-08 PROCEDURE — 25000003 PHARM REV CODE 250: Performed by: NURSE ANESTHETIST, CERTIFIED REGISTERED

## 2020-04-08 PROCEDURE — 93005 ELECTROCARDIOGRAM TRACING: CPT

## 2020-04-08 PROCEDURE — 63600175 PHARM REV CODE 636 W HCPCS: Performed by: INTERNAL MEDICINE

## 2020-04-08 PROCEDURE — 20000000 HC ICU ROOM

## 2020-04-08 PROCEDURE — 63600175 PHARM REV CODE 636 W HCPCS: Performed by: NURSE PRACTITIONER

## 2020-04-08 PROCEDURE — 63600175 PHARM REV CODE 636 W HCPCS: Performed by: NURSE ANESTHETIST, CERTIFIED REGISTERED

## 2020-04-08 PROCEDURE — 84300 ASSAY OF URINE SODIUM: CPT

## 2020-04-08 PROCEDURE — 36430 TRANSFUSION BLD/BLD COMPNT: CPT

## 2020-04-08 PROCEDURE — 84484 ASSAY OF TROPONIN QUANT: CPT | Mod: 91

## 2020-04-08 PROCEDURE — 93010 ELECTROCARDIOGRAM REPORT: CPT | Mod: ,,, | Performed by: INTERNAL MEDICINE

## 2020-04-08 PROCEDURE — 25000003 PHARM REV CODE 250: Performed by: EMERGENCY MEDICINE

## 2020-04-08 PROCEDURE — C9113 INJ PANTOPRAZOLE SODIUM, VIA: HCPCS | Performed by: EMERGENCY MEDICINE

## 2020-04-08 PROCEDURE — 37000008 HC ANESTHESIA 1ST 15 MINUTES: Performed by: INTERNAL MEDICINE

## 2020-04-08 PROCEDURE — 93010 EKG 12-LEAD: ICD-10-PCS | Mod: 76,,, | Performed by: INTERNAL MEDICINE

## 2020-04-08 PROCEDURE — P9021 RED BLOOD CELLS UNIT: HCPCS

## 2020-04-08 PROCEDURE — 93010 ELECTROCARDIOGRAM REPORT: CPT | Mod: 76,,, | Performed by: INTERNAL MEDICINE

## 2020-04-08 PROCEDURE — 85610 PROTHROMBIN TIME: CPT

## 2020-04-08 PROCEDURE — 63600175 PHARM REV CODE 636 W HCPCS: Performed by: EMERGENCY MEDICINE

## 2020-04-08 PROCEDURE — 87205 SMEAR GRAM STAIN: CPT

## 2020-04-08 PROCEDURE — 43255 EGD CONTROL BLEEDING ANY: CPT | Performed by: INTERNAL MEDICINE

## 2020-04-08 PROCEDURE — 84540 ASSAY OF URINE/UREA-N: CPT

## 2020-04-08 PROCEDURE — 84156 ASSAY OF PROTEIN URINE: CPT

## 2020-04-08 PROCEDURE — 96374 THER/PROPH/DIAG INJ IV PUSH: CPT

## 2020-04-08 PROCEDURE — 25000003 PHARM REV CODE 250: Performed by: INTERNAL MEDICINE

## 2020-04-08 PROCEDURE — 86920 COMPATIBILITY TEST SPIN: CPT

## 2020-04-08 PROCEDURE — 99223 1ST HOSP IP/OBS HIGH 75: CPT | Mod: ,,, | Performed by: INTERNAL MEDICINE

## 2020-04-08 PROCEDURE — 84484 ASSAY OF TROPONIN QUANT: CPT

## 2020-04-08 PROCEDURE — 27201022: Performed by: INTERNAL MEDICINE

## 2020-04-08 PROCEDURE — 36410 VNPNXR 3YR/> PHY/QHP DX/THER: CPT

## 2020-04-08 PROCEDURE — U0002 COVID-19 LAB TEST NON-CDC: HCPCS

## 2020-04-08 PROCEDURE — S5010 5% DEXTROSE AND 0.45% SALINE: HCPCS | Performed by: EMERGENCY MEDICINE

## 2020-04-08 PROCEDURE — 85025 COMPLETE CBC W/AUTO DIFF WBC: CPT | Mod: 91

## 2020-04-08 PROCEDURE — 82962 GLUCOSE BLOOD TEST: CPT

## 2020-04-08 PROCEDURE — C9113 INJ PANTOPRAZOLE SODIUM, VIA: HCPCS | Performed by: INTERNAL MEDICINE

## 2020-04-08 PROCEDURE — 27000221 HC OXYGEN, UP TO 24 HOURS

## 2020-04-08 PROCEDURE — 86850 RBC ANTIBODY SCREEN: CPT

## 2020-04-08 PROCEDURE — 36415 COLL VENOUS BLD VENIPUNCTURE: CPT

## 2020-04-08 PROCEDURE — 96375 TX/PRO/DX INJ NEW DRUG ADDON: CPT

## 2020-04-08 PROCEDURE — 85730 THROMBOPLASTIN TIME PARTIAL: CPT

## 2020-04-08 PROCEDURE — 99291 CRITICAL CARE FIRST HOUR: CPT | Mod: 25

## 2020-04-08 PROCEDURE — 80307 DRUG TEST PRSMV CHEM ANLYZR: CPT

## 2020-04-08 PROCEDURE — 99223 PR INITIAL HOSPITAL CARE,LEVL III: ICD-10-PCS | Mod: ,,, | Performed by: INTERNAL MEDICINE

## 2020-04-08 PROCEDURE — 80320 DRUG SCREEN QUANTALCOHOLS: CPT

## 2020-04-08 PROCEDURE — 83605 ASSAY OF LACTIC ACID: CPT

## 2020-04-08 PROCEDURE — 99900035 HC TECH TIME PER 15 MIN (STAT)

## 2020-04-08 RX ORDER — PROPOFOL 10 MG/ML
VIAL (ML) INTRAVENOUS
Status: DISCONTINUED | OUTPATIENT
Start: 2020-04-08 | End: 2020-04-08

## 2020-04-08 RX ORDER — CARVEDILOL 6.25 MG/1
6.25 TABLET ORAL 2 TIMES DAILY WITH MEALS
Status: ON HOLD | COMMUNITY
End: 2020-11-12 | Stop reason: HOSPADM

## 2020-04-08 RX ORDER — SODIUM CHLORIDE 0.9 % (FLUSH) 0.9 %
3 SYRINGE (ML) INJECTION
Status: DISCONTINUED | OUTPATIENT
Start: 2020-04-08 | End: 2020-04-11 | Stop reason: HOSPADM

## 2020-04-08 RX ORDER — HYDROMORPHONE HYDROCHLORIDE 1 MG/ML
0.5 INJECTION, SOLUTION INTRAMUSCULAR; INTRAVENOUS; SUBCUTANEOUS EVERY 30 MIN PRN
Status: DISCONTINUED | OUTPATIENT
Start: 2020-04-08 | End: 2020-04-08

## 2020-04-08 RX ORDER — OCTREOTIDE ACETATE 50 UG/ML
50 INJECTION, SOLUTION INTRAVENOUS; SUBCUTANEOUS
Status: COMPLETED | OUTPATIENT
Start: 2020-04-08 | End: 2020-04-08

## 2020-04-08 RX ORDER — SUCCINYLCHOLINE CHLORIDE 20 MG/ML
INJECTION INTRAMUSCULAR; INTRAVENOUS
Status: DISCONTINUED | OUTPATIENT
Start: 2020-04-08 | End: 2020-04-08

## 2020-04-08 RX ORDER — HYDROMORPHONE HYDROCHLORIDE 2 MG/ML
0.4 INJECTION, SOLUTION INTRAMUSCULAR; INTRAVENOUS; SUBCUTANEOUS EVERY 5 MIN PRN
Status: CANCELLED | OUTPATIENT
Start: 2020-04-08

## 2020-04-08 RX ORDER — SODIUM CHLORIDE 9 MG/ML
INJECTION, SOLUTION INTRAVENOUS CONTINUOUS
Status: DISCONTINUED | OUTPATIENT
Start: 2020-04-08 | End: 2020-04-11 | Stop reason: HOSPADM

## 2020-04-08 RX ORDER — HYDROCODONE BITARTRATE AND ACETAMINOPHEN 500; 5 MG/1; MG/1
TABLET ORAL
Status: CANCELLED | OUTPATIENT
Start: 2020-04-08

## 2020-04-08 RX ORDER — FENTANYL CITRATE 50 UG/ML
INJECTION, SOLUTION INTRAMUSCULAR; INTRAVENOUS
Status: DISCONTINUED | OUTPATIENT
Start: 2020-04-08 | End: 2020-04-08

## 2020-04-08 RX ORDER — POTASSIUM CHLORIDE 750 MG/1
10 TABLET, EXTENDED RELEASE ORAL DAILY
Status: ON HOLD | COMMUNITY
End: 2020-04-10 | Stop reason: HOSPADM

## 2020-04-08 RX ORDER — SODIUM CHLORIDE 0.9 % (FLUSH) 0.9 %
10 SYRINGE (ML) INJECTION
Status: DISCONTINUED | OUTPATIENT
Start: 2020-04-08 | End: 2020-04-11 | Stop reason: HOSPADM

## 2020-04-08 RX ORDER — OXYCODONE HYDROCHLORIDE 5 MG/1
5 TABLET ORAL
Status: CANCELLED | OUTPATIENT
Start: 2020-04-08

## 2020-04-08 RX ORDER — ROCURONIUM BROMIDE 10 MG/ML
INJECTION, SOLUTION INTRAVENOUS
Status: DISCONTINUED | OUTPATIENT
Start: 2020-04-08 | End: 2020-04-08

## 2020-04-08 RX ORDER — ONDANSETRON 2 MG/ML
8 INJECTION INTRAMUSCULAR; INTRAVENOUS
Status: COMPLETED | OUTPATIENT
Start: 2020-04-08 | End: 2020-04-08

## 2020-04-08 RX ORDER — ONDANSETRON 2 MG/ML
4 INJECTION INTRAMUSCULAR; INTRAVENOUS EVERY 8 HOURS PRN
Status: DISCONTINUED | OUTPATIENT
Start: 2020-04-08 | End: 2020-04-11 | Stop reason: HOSPADM

## 2020-04-08 RX ORDER — FOLIC ACID 1 MG/1
1 TABLET ORAL DAILY
Status: DISCONTINUED | OUTPATIENT
Start: 2020-04-09 | End: 2020-04-11 | Stop reason: HOSPADM

## 2020-04-08 RX ORDER — SODIUM CHLORIDE 9 MG/ML
INJECTION, SOLUTION INTRAVENOUS CONTINUOUS PRN
Status: DISCONTINUED | OUTPATIENT
Start: 2020-04-08 | End: 2020-04-08

## 2020-04-08 RX ORDER — DIPHENHYDRAMINE HYDROCHLORIDE 50 MG/ML
25 INJECTION INTRAMUSCULAR; INTRAVENOUS EVERY 6 HOURS PRN
Status: CANCELLED | OUTPATIENT
Start: 2020-04-08

## 2020-04-08 RX ORDER — TRAMADOL HYDROCHLORIDE 50 MG/1
50 TABLET ORAL EVERY 6 HOURS PRN
Status: DISCONTINUED | OUTPATIENT
Start: 2020-04-08 | End: 2020-04-11 | Stop reason: HOSPADM

## 2020-04-08 RX ORDER — SODIUM CHLORIDE, SODIUM LACTATE, POTASSIUM CHLORIDE, CALCIUM CHLORIDE 600; 310; 30; 20 MG/100ML; MG/100ML; MG/100ML; MG/100ML
INJECTION, SOLUTION INTRAVENOUS CONTINUOUS PRN
Status: DISCONTINUED | OUTPATIENT
Start: 2020-04-08 | End: 2020-04-08

## 2020-04-08 RX ORDER — MEPERIDINE HYDROCHLORIDE 25 MG/ML
12.5 INJECTION INTRAMUSCULAR; INTRAVENOUS; SUBCUTANEOUS ONCE AS NEEDED
Status: CANCELLED | OUTPATIENT
Start: 2020-04-08 | End: 2020-04-09

## 2020-04-08 RX ORDER — TALC
6 POWDER (GRAM) TOPICAL NIGHTLY PRN
Status: DISCONTINUED | OUTPATIENT
Start: 2020-04-08 | End: 2020-04-11 | Stop reason: HOSPADM

## 2020-04-08 RX ORDER — LIDOCAINE HCL/PF 100 MG/5ML
SYRINGE (ML) INTRAVENOUS
Status: DISCONTINUED | OUTPATIENT
Start: 2020-04-08 | End: 2020-04-08

## 2020-04-08 RX ORDER — LANOLIN ALCOHOL/MO/W.PET/CERES
100 CREAM (GRAM) TOPICAL DAILY
Status: DISCONTINUED | OUTPATIENT
Start: 2020-04-09 | End: 2020-04-11 | Stop reason: HOSPADM

## 2020-04-08 RX ORDER — OCTREOTIDE ACETATE 500 UG/ML
500 INJECTION, SOLUTION INTRAVENOUS; SUBCUTANEOUS
Status: DISCONTINUED | OUTPATIENT
Start: 2020-04-08 | End: 2020-04-08

## 2020-04-08 RX ORDER — HYDROCODONE BITARTRATE AND ACETAMINOPHEN 500; 5 MG/1; MG/1
TABLET ORAL
Status: DISCONTINUED | OUTPATIENT
Start: 2020-04-08 | End: 2020-04-11 | Stop reason: HOSPADM

## 2020-04-08 RX ORDER — ONDANSETRON 2 MG/ML
4 INJECTION INTRAMUSCULAR; INTRAVENOUS DAILY PRN
Status: CANCELLED | OUTPATIENT
Start: 2020-04-08

## 2020-04-08 RX ORDER — PANTOPRAZOLE SODIUM 40 MG/10ML
80 INJECTION, POWDER, LYOPHILIZED, FOR SOLUTION INTRAVENOUS
Status: COMPLETED | OUTPATIENT
Start: 2020-04-08 | End: 2020-04-08

## 2020-04-08 RX ORDER — PHENYLEPHRINE HYDROCHLORIDE 10 MG/ML
INJECTION INTRAVENOUS
Status: DISCONTINUED | OUTPATIENT
Start: 2020-04-08 | End: 2020-04-08

## 2020-04-08 RX ADMIN — DEXTROSE 8 MG/HR: 50 INJECTION, SOLUTION INTRAVENOUS at 06:04

## 2020-04-08 RX ADMIN — SUCCINYLCHOLINE CHLORIDE 120 MG: 20 INJECTION, SOLUTION INTRAMUSCULAR; INTRAVENOUS at 02:04

## 2020-04-08 RX ADMIN — ROCURONIUM BROMIDE 5 MG: 10 INJECTION, SOLUTION INTRAVENOUS at 02:04

## 2020-04-08 RX ADMIN — PHENYLEPHRINE HYDROCHLORIDE 200 MCG: 10 INJECTION INTRAVENOUS at 02:04

## 2020-04-08 RX ADMIN — SODIUM CHLORIDE: 0.9 INJECTION, SOLUTION INTRAVENOUS at 01:04

## 2020-04-08 RX ADMIN — OCTREOTIDE ACETATE 50 MCG/HR: 500 INJECTION, SOLUTION INTRAVENOUS; SUBCUTANEOUS at 02:04

## 2020-04-08 RX ADMIN — Medication 6 MG: at 10:04

## 2020-04-08 RX ADMIN — DEXTROSE AND SODIUM CHLORIDE 500 ML: 5; .45 INJECTION, SOLUTION INTRAVENOUS at 02:04

## 2020-04-08 RX ADMIN — TRAMADOL HYDROCHLORIDE 50 MG: 50 TABLET, FILM COATED ORAL at 06:04

## 2020-04-08 RX ADMIN — LIDOCAINE HYDROCHLORIDE 40 MG: 20 INJECTION, SOLUTION INTRAVENOUS at 02:04

## 2020-04-08 RX ADMIN — LORAZEPAM 1 MG: 2 INJECTION, SOLUTION INTRAMUSCULAR; INTRAVENOUS at 07:04

## 2020-04-08 RX ADMIN — PROPOFOL 200 MG: 10 INJECTION, EMULSION INTRAVENOUS at 02:04

## 2020-04-08 RX ADMIN — PROPOFOL 30 MG: 10 INJECTION, EMULSION INTRAVENOUS at 02:04

## 2020-04-08 RX ADMIN — SODIUM CHLORIDE, SODIUM LACTATE, POTASSIUM CHLORIDE, AND CALCIUM CHLORIDE: 600; 310; 30; 20 INJECTION, SOLUTION INTRAVENOUS at 02:04

## 2020-04-08 RX ADMIN — PHENYLEPHRINE HYDROCHLORIDE 100 MCG: 10 INJECTION INTRAVENOUS at 02:04

## 2020-04-08 RX ADMIN — OCTREOTIDE ACETATE 50 MCG/HR: 500 INJECTION, SOLUTION INTRAVENOUS; SUBCUTANEOUS at 11:04

## 2020-04-08 RX ADMIN — ONDANSETRON 8 MG: 2 INJECTION INTRAMUSCULAR; INTRAVENOUS at 12:04

## 2020-04-08 RX ADMIN — PANTOPRAZOLE SODIUM 80 MG: 40 INJECTION, POWDER, LYOPHILIZED, FOR SOLUTION INTRAVENOUS at 12:04

## 2020-04-08 RX ADMIN — OCTREOTIDE ACETATE 50 MCG: 50 INJECTION, SOLUTION INTRAVENOUS; SUBCUTANEOUS at 01:04

## 2020-04-08 RX ADMIN — CEFTRIAXONE 1 G: 1 INJECTION, SOLUTION INTRAVENOUS at 04:04

## 2020-04-08 RX ADMIN — FENTANYL CITRATE 100 MCG: 50 INJECTION, SOLUTION INTRAMUSCULAR; INTRAVENOUS at 02:04

## 2020-04-08 RX ADMIN — Medication 20 MG: at 02:04

## 2020-04-08 RX ADMIN — FOLIC ACID: 5 INJECTION, SOLUTION INTRAMUSCULAR; INTRAVENOUS; SUBCUTANEOUS at 08:04

## 2020-04-08 RX ADMIN — SODIUM CHLORIDE 1000 ML: 0.9 INJECTION, SOLUTION INTRAVENOUS at 12:04

## 2020-04-08 RX ADMIN — DEXTROSE 8 MG/HR: 50 INJECTION, SOLUTION INTRAVENOUS at 03:04

## 2020-04-08 NOTE — ANESTHESIA PROCEDURE NOTES
Intubation  Performed by: Corey Jarvis Jr., CRNA  Authorized by: Corby Gomez MD     Intubation:     Induction:  Rapid sequence induction    Intubated:  Postinduction    Mask Ventilation:  N/a    Attempts:  1    Attempted By:  CRNA    Method of Intubation:  Video laryngoscopy    Blade:  Mazariegos 3    Laryngeal View Grade: Grade I - full view of chords      Difficult Airway Encountered?: No      Complications:  None    Airway Device:  Oral endotracheal tube    Airway Device Size:  7.5    Tube secured:  22    Secured at:  The lips    Placement Verified By:  Capnometry    Complicating Factors:  None    Findings Post-Intubation:  BS equal bilateral and atraumatic/condition of teeth unchanged

## 2020-04-08 NOTE — PROGRESS NOTES
[facility] Hospital Medicine  Progress Note    Patient Name: Irvin Diaz  MRN: [id number]  Patient Class: IP- Inpatient   Admission Date: 4/8/2020  Length of Stay: 0 days  Attending Physician: Ayah Whitney MD  Primary Care Provider: [facility]        Subjective:     Principal Problem:Acute upper GI bleed        HPI:  Irvin Diaz is a 44 y.o. male with PMHx alcoholic cirrhosis, varices (s/p banding x2 ), gastritis, microcytic anemia, thrombocytopenia, PE, CKD 3 who presented to the ED d/t hematemesis.    USOH:  He lives with his significant other.  On disability/SSI.  PCP at [facility].    Yesterday, he felt fatigued with exertion.  He did not eat yesterday or today due to decreased appetite.  Took Aleeve x 2 yesterday d/t headache.  He also reported palpitations late last night.  Denied dizziness, lightheadedness, SOB, CP.  He also did not eat anything this morning due to decreased appetite and started to have episodes of hematemesis around noon.  It initially appeared like coffee ground emesis which progressed to bright red hematemesis right before and in the ED.    He has mild diffuse abd pain.  No hematochezia, melena or diarrhea.     He has a history of ETOH cirrhosis.  Prior imaging was not concerning for HCC.  He underwent EGD  due to coffee-ground emesis which revealed grade II varices, grade B reflux esophagitis, chronic gastritis.  S/p banding x2 in 3-2018.  Follows with [facility]tar L[facility]PE[facility] at [facility].  He was on propranolol, but this was switched to Coreg by nephrology.  He has a history of ETOH abuse and used to drink a pint of gin daily.  Last drink reportedly 1 month ago.  H/o withdrawals when he initially quit, but no hospitalizations for this.  No h/o seizures. No illicit drug use.  H/o C scope at [facility]  with diverticulosis.    He has a history of unprovoked PE, DVT .  He was on Coumadin, but this was discontinued  due to GI bleed.  He underwent hemophilia workup with [facility].    He has chronic hip pain.  Was unable to perform hip replacement due to cirrhosis.  Used to be on tramadol.    He has a history of ATN thought to be from volume depletion from colitis and ETOH use.  He follows with Nephrology, Dr. Samson.    Overview/Hospital Course:  ED reported 200-300 cc of hematemesis in the ED.  He was tachycardic up to 128 with hypotension to 89/44.  H/H 9.5/29 with baseline HGB 9-10.  INR 1.3.  Troponin was elevated at 0.043, likely demand ischemia.  EKGs and troponins were trended.    He was admitted to the ICU and underwent emergent EGD that revealed 2 columns of esophageal varices, 1 with active bleeding.  There was active oozing in the cardia, fundus and gastric body.  S/p banding x4.  He was started on octreotide, Protonix gtts and Rocephin for SBP prophylaxis.  There was no ascites.  He was transfused 1 unit PRBCs due to bleeding seen on EGD.  Plan to repeat EGD in 4 weeks for banding.    Labs also revealed ENA on CKD 3 with creatinine elevated to 1.7.  Baseline creatinine 1.0-1.3.  He was transfused as above, given banana bag and started on IVF.  Lisinopril 10 was held.    Past Medical History:   Diagnosis Date    Alcoholic cirrhosis of liver     Arthritis     ATN (acute tubular necrosis)     Diverticulitis 01/2020    GI bleed     Hip arthritis     Left    Hypertension     Macrocytic anemia     Pulmonary embolism 08/2018    Unprovoked DVT.  Stop Coumadin due to GIB    Thrombocytopenia        Past Surgical History:   Procedure Laterality Date    ANKLE SURGERY      COLON SURGERY  2007    COLONOSCOPY  09/05/2019    [facility]    ESOPHAGOGASTRODUODENOSCOPY N/A 7/26/2019    Procedure: EGD (ESOPHAGOGASTRODUODENOSCOPY);  Surgeon: Brian Trivedi MD;  Location: [facility];  Service: Endoscopy;  Laterality: N/A;    HERNIA REPAIR Left     Inguinal       Review of patient's allergies indicates:   Allergen Reactions     Ciprofloxacin hcl Hallucinations    Morphine Itching       No current facility-administered medications on file prior to encounter.      Current Outpatient Medications on File Prior to Encounter   Medication Sig    amLODIPine (NORVASC) 5 MG tablet Take 1 tablet (5 mg total) by mouth once daily. (Patient taking differently: Take 10 mg by mouth once daily. )    carvediloL (COREG) 6.25 MG tablet Take 12.5 mg by mouth 2 (two) times daily with meals.     lisinopril 10 MG tablet Take 10 mg by mouth once daily.    pantoprazole (PROTONIX) 40 MG tablet Take 1 tablet (40 mg total) by mouth once daily.    potassium chloride SA (K-DUR,KLOR-CON) 10 MEQ tablet Take 10 mEq by mouth once daily.     ondansetron (ZOFRAN-ODT) 4 MG TbDL Take 1 tablet (4 mg total) by mouth every 6 (six) hours as needed.    [DISCONTINUED] amoxicillin-clavulanate 875-125mg (AUGMENTIN) 875-125 mg per tablet Take 1 tablet by mouth 2 (two) times daily.    [DISCONTINUED] propranolol (INDERAL) 10 MG tablet Take 1 tablet (10 mg total) by mouth 3 (three) times daily.    [DISCONTINUED] traZODone (DESYREL) 50 MG tablet Take 50 mg by mouth every evening.     Family History     Problem Relation (Age of Onset)    Bladder Cancer Father    Breast cancer Mother    Hypertension Mother, Father    Prostate cancer Father        Tobacco Use    Smoking status: Former Smoker     Last attempt to quit: 2000     Years since quittin.2    Smokeless tobacco: Never Used    Tobacco comment: quit 20 years ago   Substance and Sexual Activity    Alcohol use: Not Currently     Comment: freq    Drug use: Not Currently     Types: Marijuana    Sexual activity: Yes     Comment: occ     Review of Systems   Constitutional: Positive for appetite change. Negative for fever.   HENT: Positive for sore throat. Negative for rhinorrhea and sneezing.    Eyes: Negative for discharge and redness.   Respiratory: Negative for cough and shortness of breath.    Cardiovascular:  Negative for chest pain, palpitations and leg swelling.   Gastrointestinal: Negative for abdominal pain, blood in stool, nausea and vomiting.   Genitourinary: Negative for difficulty urinating and dysuria.   Musculoskeletal: Positive for arthralgias. Negative for gait problem.   Skin: Negative for rash and wound.   Neurological: Negative for dizziness and light-headedness.   Hematological: Negative for adenopathy.   Psychiatric/Behavioral: Negative for confusion. The patient is not nervous/anxious.      Objective:     Vital Signs (Most Recent):  Temp: 99 °F (37.2 °C) (04/08/20 1205)  Pulse: 92 (04/08/20 1336)  Resp: 20 (04/08/20 1336)  BP: (!) 90/44 (04/08/20 1336)  SpO2: 97 % (04/08/20 1336) Vital Signs (24h Range):  Temp:  [99 °F (37.2 °C)] 99 °F (37.2 °C)  Pulse:  [] 92  Resp:  [17-20] 20  SpO2:  [97 %-100 %] 97 %  BP: ()/(44-78) 90/44     Weight: 86.6 kg (191 lb)  Body mass index is 25.9 kg/m².    Physical Exam   Constitutional: He appears well-developed and well-nourished. No distress.   HENT:   Head: Normocephalic and atraumatic.   Dry mouth. OP clear.  Sublingual jaundice   Eyes: Conjunctivae and EOM are normal. Right eye exhibits no discharge. Left eye exhibits no discharge. Scleral icterus is present.   Neck: No tracheal deviation present.   Cardiovascular: Regular rhythm and normal heart sounds.   No murmur heard.  Tachycardia   Pulmonary/Chest: Effort normal and breath sounds normal. No stridor. No respiratory distress. He has no wheezes.   Abdominal: Soft. Bowel sounds are normal. He exhibits no distension. There is no tenderness.   Musculoskeletal: He exhibits no edema or tenderness.   Neurological: He is alert. No cranial nerve deficit.   Skin: Skin is warm. No rash noted. He is not diaphoretic.   Psychiatric: He has a normal mood and affect. His behavior is normal.         CRANIAL NERVES     CN III, IV, VI   Extraocular motions are normal.        Significant Labs:   CBC:   Recent Labs    Lab 04/08/20  1215   WBC 10.86   HGB 9.5*   HCT 29.0*   PLT 80*     CMP:   Recent Labs   Lab 04/08/20  1215      K 3.8   CL 97   CO2 12*   GLU 57*   BUN 13   CREATININE 1.7*   CALCIUM 9.3   PROT 8.8*   ALBUMIN 3.3*   BILITOT 4.9*   ALKPHOS 100   *   ALT 48*   ANIONGAP 27*   EGFRNONAA 48*     Cardiac Markers: No results for input(s): CKMB, MYOGLOBIN, BNP, TROPISTAT in the last 48 hours.  Coagulation:   Recent Labs   Lab 04/08/20  1215   INR 1.3*   APTT 32.0       Significant Imaging: CXR: I have reviewed all pertinent results/findings within the past 24 hours and my personal findings are:  No acute abnormality, no consolidation      Assessment/Plan:      * Acute upper GI bleed  -with hypotension 89/44, tachycardic to 128 on admission  -Hgb at baseline, 9-10  -GI following.  EGD with varices, oozing.  S/p banding x 4  -will need repeat EGD in 4 weeks  -start PPI, octreotide gtt  -CBC q6h, vitals q.4 hours  -will start 1 unit PRBCs due to active bleeding on EGD      Hip arthritis  -unable to perform outpatient hip replacement due to cirrhosis  -avoid NSAIDs due to GI bleed  -tramadol p.r.n.      Elevated troponin level  -likely demand ischemia from hypovolemia, GI bleed  -will trend troponins, EKG    CKD (chronic kidney disease) stage 3, GFR 30-59 ml/min  -see ENA  -follows with Nephrology, Dr. Samson    High anion gap metabolic acidosis  -possibly from ETOH, ENA  -check lactic acid, ETOH, tox      Macrocytic anemia  -iron panel  with ferritin 160, TIBC low.  B12, folate WNL  -see GI bleed      Alcoholic cirrhosis of liver  -follows with [facility] at [facility]  -prior imaging not c/w HCC  -see GI bleed      Essential hypertension  -currently with hypovolemia from GI bleed  -hold Coreg 12.5 b.i.d., Norvasc 10  -hold lisinopril 10 due to ENA  -will monitor      ENA (acute kidney injury)  -likely hypovolemic from UGIB, decreased p.o. intake  -will receive 1 unit PRBCs, followed by banana bag and NS at  125 cc/h while NPO  -ordering urine lytes  -hold lisinopril 10      Transaminitis  -mildly elevated, 2:1 ratio.  Likely 2/2 ETOH and cirrhosis  -hepatitis panel NR   -monitor    Esophageal varices  -h/o banding x2 in   -Repeat EGD with varices, s/p banding x4  -propranolol was switched to Coreg; hold given hypovolemic shock      History of pulmonary embolus (PE)  -unprovoked  -underwent outpatient hemophilia workup with Hematology at [facility]  -Coumadin was discontinued due to GI bleed  -IAIN hose, SCDs d/t GIB      Thrombocytopenia  -stable  -2/2 cirrhosis  -has UGIB, but no need for platelet transfusion at this time  -continue to monitor      VTE Risk Mitigation (From admission, onward)         Ordered     Place IAIN hose  Until discontinued      04/08/20 1617     IP VTE HIGH RISK PATIENT  Once      04/08/20 1617     Place IAIN hose  Until discontinued      04/08/20 1617     Place sequential compression device  Until discontinued      04/08/20 1617     Reason for no Mechanical VTE Prophylaxis  Once     Question:  Reasons:  Answer:  Risk of Bleeding    04/08/20 1617                      Cyndee Salinas MD  Department of Hospital Medicine   [facility]

## 2020-04-08 NOTE — ASSESSMENT & PLAN NOTE
-h/o banding x2 in   -Repeat EGD with varices, s/p banding x4  -propranolol was switched to Coreg; hold given hypovolemic shock

## 2020-04-08 NOTE — ASSESSMENT & PLAN NOTE
-currently with hypovolemia from GI bleed  -hold Coreg 12.5 b.i.d., Norvasc 10  -hold lisinopril 10 due to ENA  -will monitor

## 2020-04-08 NOTE — ASSESSMENT & PLAN NOTE
-with hypotension 89/44, tachycardic to 128 on admission  -Hgb at baseline, 9-10  -GI following.  EGD with varices, oozing.  S/p banding x 4  -will need repeat EGD in 4 weeks  -start PPI, octreotide gtt  -CBC q6h, vitals q.4 hours  -will start 1 unit PRBCs due to active bleeding on EGD

## 2020-04-08 NOTE — [employer] PATIENT INSTRUCTIONS
Discharge Summary/Instructions after an Endoscopic Procedure  Patient Name: Irvin Macias  Patient MRN: [id number]  Patient YOB: 1975  Wednesday, April 8, 2020  Donn Acuna MD  RESTRICTIONS:  During your procedure today, you received medications for sedation.  These   medications may affect your judgment, balance and coordination.  Therefore,   for 24 hours, you have the following restrictions:   - DO NOT drive a car, operate machinery, make legal/financial decisions,   sign important papers or drink alcohol.    ACTIVITY:  Today: no heavy lifting, straining or running due to procedural   sedation/anesthesia.  The following day: return to full activity including work.  DIET:  Eat and drink normally unless instructed otherwise.     TREATMENT FOR COMMON SIDE EFFECTS:  - Mild abdominal pain, nausea, belching, bloating or excessive gas:  rest,   eat lightly and use a heating pad.  - Sore Throat: treat with throat lozenges and/or gargle with warm salt   water.  - Because air was used during the procedure, expelling large amounts of air   from your rectum or belching is normal.  - If a bowel prep was taken, you may not have a bowel movement for 1-3 days.    This is normal.  SYMPTOMS TO WATCH FOR AND REPORT TO YOUR PHYSICIAN:  1. Abdominal pain or bloating, other than gas cramps.  2. Chest pain.  3. Back pain.  4. Signs of infection such as: chills or fever occurring within 24 hours   after the procedure.  5. Rectal bleeding, which would show as bright red, maroon, or black stools.   (A tablespoon of blood from the rectum is not serious, especially if   hemorrhoids are present.)  6. Vomiting.  7. Weakness or dizziness.  GO DIRECTLY TO THE NEAREST EMERGENCY ROOM IF YOU HAVE ANY OF THE FOLLOWING:      Difficulty breathing              Chills and/or fever over 101 F   Persistent vomiting and/or vomiting blood   Severe abdominal pain   Severe chest pain   Black, tarry stools   Bleeding- more than one  tablespoon   Any other symptom or condition that you feel may need urgent attention  Your doctor recommends these additional instructions:  If any biopsies were taken, your doctors clinic will contact you in 1 to 2   weeks with any results.  - Admit the patient to ICU for ongoing care.   - NPO.   - Give Protonix (pantoprazole): 8 mg/hr IV by continuous infusion.   - Administer an IV bolus of 50 micrograms of octreotide followed by an   infusion of 50 micrograms per hour.   - Repeat upper endoscopy in 4 weeks for endoscopic band ligation.   - Rocephin 1 gram IV daily for SBP prophylaxis.  - Check hemoglobin q 6 hours until stable.  For questions, problems or results please call your physician - Donn Acuna MD at Work:  [phone].  [facility], EMERGENCY ROOM PHONE NUMBER: [phone], [facility]   [phone].  IF A COMPLICATION OR EMERGENCY SITUATION ARISES AND YOU ARE UNABLE TO REACH   YOUR PHYSICIAN - GO DIRECTLY TO THE EMERGENCY ROOM.  MD Donn Fleming MD  4/8/2020 3:27:56 PM  This report has been verified and signed electronically.  [employer]

## 2020-04-08 NOTE — ASSESSMENT & PLAN NOTE
-unprovoked  -underwent outpatient hemophilia workup with Hematology at [facility]  -Coumadin was discontinued due to GI bleed  -IAIN hose, SCDs d/t GIB

## 2020-04-08 NOTE — ASSESSMENT & PLAN NOTE
-stable  -2/2 cirrhosis  -has UGIB, but no need for platelet transfusion at this time  -continue to monitor

## 2020-04-08 NOTE — ANESTHESIA POSTPROCEDURE EVALUATION
Anesthesia Post Evaluation    Patient: Irvin Diaz    Procedure(s) Performed: Procedure(s) (LRB):  EGD (ESOPHAGOGASTRODUODENOSCOPY) (N/A)    Final Anesthesia Type: general    Patient location during evaluation: PACU  Patient participation: Yes- Able to Participate  Level of consciousness: awake and alert and oriented  Post-procedure vital signs: reviewed and stable  Pain management: adequate  Airway patency: patent    PONV status at discharge: No PONV  Anesthetic complications: no      Cardiovascular status: blood pressure returned to baseline and hemodynamically stable  Respiratory status: unassisted, spontaneous ventilation and nasal cannula  Hydration status: euvolemic  Follow-up not needed.          Vitals Value Taken Time   BP 97/49 4/8/2020  4:49 PM   Temp 37.2 °C (99 °F) 4/8/2020 12:05 PM   Pulse 114 4/8/2020  5:37 PM   Resp 21 4/8/2020  1:37 PM   SpO2 100 % 4/8/2020  5:37 PM   Vitals shown include unvalidated device data.      No case tracking events are documented in the log.      Pain/Leno Score: No data recorded

## 2020-04-08 NOTE — ANESTHESIA PREPROCEDURE EVALUATION
04/08/2020  Irvin Diaz is a 44 y.o., male.    Anesthesia Evaluation    I have reviewed the Patient Summary Reports.    I have reviewed the Nursing Notes.   I have reviewed the Medications.     Review of Systems  Anesthesia Hx:  No problems with previous Anesthesia    Social:  Alcohol Use, Former Smoker    Hematology/Oncology:     Oncology Normal    -- Anemia: Hematology Comments: Coagulopathy.    EENT/Dental:EENT/Dental Normal   Cardiovascular:   Hypertension    Pulmonary:  Pulmonary Normal H/O PE.   Renal/:   Chronic Renal Disease, CRI    Hepatic/GI:   Liver Disease,    Musculoskeletal:   Arthritis     Neurological:  Neurology Normal    Endocrine:  Endocrine Normal    Dermatological:  Skin Normal    Psych:  Psychiatric Normal           Physical Exam  General:  Well nourished    Airway/Jaw/Neck:  Airway Findings: Mouth Opening: Normal Tongue: Normal  General Airway Assessment: Adult, Average  Mallampati: II  TM Distance: Normal, at least 6 cm  Jaw/Neck Findings:  Neck ROM: Normal ROM      Dental:  Dental Findings: In tact        Mental Status:  Mental Status Findings:  Cooperative, Alert and Oriented         Anesthesia Plan  Type of Anesthesia, risks & benefits discussed:  Anesthesia Type:  general  Patient's Preference:   Intra-op Monitoring Plan: standard ASA monitors  Intra-op Monitoring Plan Comments:   Post Op Pain Control Plan: per primary service following discharge from PACU  Post Op Pain Control Plan Comments:   Induction:    Beta Blocker:         Informed Consent: Patient understands risks and agrees with Anesthesia plan.  Questions answered. Anesthesia consent signed with patient.  ASA Score: 3  emergent   Day of Surgery Review of History & Physical:    H&P update referred to the provider.     Anesthesia Plan Notes: RSI        Ready For Surgery From Anesthesia Perspective.

## 2020-04-08 NOTE — ASSESSMENT & PLAN NOTE
-unable to perform outpatient hip replacement due to cirrhosis  -avoid NSAIDs due to GI bleed  -tramadol p.r.n.

## 2020-04-08 NOTE — ED NOTES
Pt brought to endoscopy on transport monitor by nurse kika Prater. Pt stable for transport. All belongings and blood consent sent with patient and given to endoscopy nurse.

## 2020-04-08 NOTE — SUBJECTIVE & OBJECTIVE
Past Medical History:   Diagnosis Date    Alcoholic cirrhosis of liver     Arthritis     ATN (acute tubular necrosis)     Diverticulitis 01/2020    GI bleed     Hip arthritis     Left    Hypertension     Macrocytic anemia     Pulmonary embolism 08/2018    Unprovoked DVT.  Stop Coumadin due to GIB    Thrombocytopenia        Past Surgical History:   Procedure Laterality Date    ANKLE SURGERY      COLON SURGERY  2007    [facility]  09/05/2019    [facility]    ESOPHAGOGASTRODUODENOSCOPY N/A 7/26/2019    Procedure: EGD (ESOPHAGOGASTRODUODENOSCOPY);  Surgeon: Brian Trivedi MD;  Location: [facility];  Service: Endoscopy;  Laterality: N/A;    HERNIA REPAIR Left     Inguinal       Review of patient's allergies indicates:   Allergen Reactions    Ciprofloxacin hcl Hallucinations    Morphine Itching       No current facility-administered medications on file prior to encounter.      Current Outpatient Medications on File Prior to Encounter   Medication Sig    amLODIPine (NORVASC) 5 MG tablet Take 1 tablet (5 mg total) by mouth once daily. (Patient taking differently: Take 10 mg by mouth once daily. )    carvediloL (COREG) 6.25 MG tablet Take 12.5 mg by mouth 2 (two) times daily with meals.     lisinopril 10 MG tablet Take 10 mg by mouth once daily.    pantoprazole (PROTONIX) 40 MG tablet Take 1 tablet (40 mg total) by mouth once daily.    potassium chloride SA (K-DUR,KLOR-CON) 10 MEQ tablet Take 10 mEq by mouth once daily.     ondansetron (ZOFRAN-ODT) 4 MG TbDL Take 1 tablet (4 mg total) by mouth every 6 (six) hours as needed.    [DISCONTINUED] amoxicillin-clavulanate 875-125mg (AUGMENTIN) 875-125 mg per tablet Take 1 tablet by mouth 2 (two) times daily.    [DISCONTINUED] propranolol (INDERAL) 10 MG tablet Take 1 tablet (10 mg total) by mouth 3 (three) times daily.    [DISCONTINUED] traZODone (DESYREL) 50 MG tablet Take 50 mg by mouth every evening.     Family History     Problem Relation (Age of Onset)     Bladder Cancer Father    Breast cancer Mother    Hypertension Mother, Father    Prostate cancer Father        Tobacco Use    Smoking status: Former Smoker     Last attempt to quit: 2000     Years since quittin.2    Smokeless tobacco: Never Used    Tobacco comment: quit 20 years ago   Substance and Sexual Activity    Alcohol use: Not Currently     Comment: freq    Drug use: Not Currently     Types: Marijuana    Sexual activity: Yes     Comment: occ     Review of Systems   Constitutional: Positive for appetite change. Negative for fever.   HENT: Positive for sore throat. Negative for rhinorrhea and sneezing.    Eyes: Negative for discharge and redness.   Respiratory: Negative for cough and shortness of breath.    Cardiovascular: Negative for chest pain, palpitations and leg swelling.   Gastrointestinal: Negative for abdominal pain, blood in stool, nausea and vomiting.   Genitourinary: Negative for difficulty urinating and dysuria.   Musculoskeletal: Positive for arthralgias. Negative for gait problem.   Skin: Negative for rash and wound.   Neurological: Negative for dizziness and light-headedness.   Hematological: Negative for adenopathy.   Psychiatric/Behavioral: Negative for confusion. The patient is not nervous/anxious.      Objective:     Vital Signs (Most Recent):  Temp: 99 °F (37.2 °C) (20 1205)  Pulse: 92 (20 1336)  Resp: 20 (20 1336)  BP: (!) 90/44 (20 1336)  SpO2: 97 % (20 1336) Vital Signs (24h Range):  Temp:  [99 °F (37.2 °C)] 99 °F (37.2 °C)  Pulse:  [] 92  Resp:  [17-20] 20  SpO2:  [97 %-100 %] 97 %  BP: ()/(44-78) 90/44     Weight: 86.6 kg (191 lb)  Body mass index is 25.9 kg/m².    Physical Exam   Constitutional: He appears well-developed and well-nourished. No distress.   HENT:   Head: Normocephalic and atraumatic.   Dry mouth. OP clear.  Sublingual jaundice   Eyes: Conjunctivae and EOM are normal. Right eye exhibits no discharge. Left eye exhibits  no discharge. Scleral icterus is present.   Neck: No tracheal deviation present.   Cardiovascular: Regular rhythm and normal heart sounds.   No murmur heard.  Tachycardia   Pulmonary/Chest: Effort normal and breath sounds normal. No stridor. No respiratory distress. He has no wheezes.   Abdominal: Soft. Bowel sounds are normal. He exhibits no distension. There is no tenderness.   Musculoskeletal: He exhibits no edema or tenderness.   Neurological: He is alert. No cranial nerve deficit.   Skin: Skin is warm. No rash noted. He is not diaphoretic.   Psychiatric: He has a normal mood and affect. His behavior is normal.         CRANIAL NERVES     CN III, IV, VI   Extraocular motions are normal.        Significant Labs:   CBC:   Recent Labs   Lab 04/08/20  1215   WBC 10.86   HGB 9.5*   HCT 29.0*   PLT 80*     CMP:   Recent Labs   Lab 04/08/20  1215      K 3.8   CL 97   CO2 12*   GLU 57*   BUN 13   CREATININE 1.7*   CALCIUM 9.3   PROT 8.8*   ALBUMIN 3.3*   BILITOT 4.9*   ALKPHOS 100   *   ALT 48*   ANIONGAP 27*   EGFRNONAA 48*     Cardiac Markers: No results for input(s): CKMB, MYOGLOBIN, BNP, TROPISTAT in the last 48 hours.  Coagulation:   Recent Labs   Lab 04/08/20  1215   INR 1.3*   APTT 32.0       Significant Imaging: CXR: I have reviewed all pertinent results/findings within the past 24 hours and my personal findings are:  No acute abnormality, no consolidation

## 2020-04-08 NOTE — HPI
From H&P by Dr. Salinas:  Mr. Diaz is a 44 y.o. male with PMHx alcoholic cirrhosis, varices (s/p banding x2 ), gastritis, microcytic anemia, thrombocytopenia, PE, CKD 3 who presented to the ED d/t hematemesis.    USOH:  He lives with his significant other.  On disability/SSI.  PCP (Dr. Snyder) at [facility].    Yesterday, he felt fatigued with exertion.  He did not eat yesterday or today due to decreased appetite.  Took Aleve x 2 yesterday d/t headache.  He also reported palpitations late last night.  Denied dizziness, lightheadedness, SOB, CP.  He also did not eat anything this morning due to decreased appetite and started to have episodes of hematemesis around noon.  It initially appeared like coffee ground emesis which progressed to bright red hematemesis right before and in the ED.    He has mild diffuse abd pain.  No hematochezia, melena or diarrhea.     He has a history of ETOH cirrhosis.  Prior imaging was not concerning for HCC.  He underwent EGD  due to coffee-ground emesis which revealed grade II varices, grade B reflux esophagitis, chronic gastritis.  S/p banding x2 in 3-2018.  Follows with Avtar LÓPEZ at [facility].  He was on propranolol, but this was switched to Coreg by nephrology.  He has a history of ETOH abuse and used to drink a pint of gin daily.  Last drink reportedly 1 month ago.  H/o withdrawals when he initially quit, but no hospitalizations for this.  No h/o seizures. No illicit drug use.  H/o C scope at [facility]  with diverticulosis.    He has a history of unprovoked PE, DVT .  He was on Coumadin, but this was discontinued due to GI bleed.  He underwent hemophilia workup with [facility].    He has chronic hip pain.  Was unable to perform hip replacement due to cirrhosis.  Used to be on tramadol.    He has a history of ATN thought to be from volume depletion from colitis and ETOH use.  He follows with Nephrology, Dr. Samson.

## 2020-04-08 NOTE — TRANSFER OF CARE
Anesthesia Transfer of Care Note    Patient: Irvin Diaz    Procedure(s) Performed: Procedure(s) (LRB):  EGD (ESOPHAGOGASTRODUODENOSCOPY) (N/A)    Patient location: PACU (ICU)    Anesthesia Type: general    Transport from OR: Transported from OR on 2-3 L/min O2 by NC with adequate spontaneous ventilation. Continuous SpO2 monitoring in transport    Post pain: adequate analgesia    Post assessment: no apparent anesthetic complications and tolerated procedure well    Post vital signs: stable    Level of consciousness: awake and alert    Nausea/Vomiting: no nausea/vomiting    Complications: none    Transfer of care protocol was followed      Last vitals:   Visit Vitals  BP (!) 90/44   Pulse 92   Temp 37.2 °C (99 °F) (Oral)   Resp 20   Ht 6' (1.829 m)   Wt 86.6 kg (191 lb)   SpO2 97%   BMI 25.90 kg/m²

## 2020-04-08 NOTE — CONSULTS
Gastroenterology Consult    4/8/2020  2:55 PM    Consulting Physician:  Donn Acuna MD    Primary Care Provider: [facility]    Reason for consultation: Hematemesis    HPI:  Irvin Diaz is a 44 y.o. male who presents with complaints of large volume hematemesis that started today around noon.  No specific triggers.  No clear aggravating or alleviating factors.  He had another large volume episode of hematemesis in the ED.  GI consulted emergently as patient has a history of ETOH cirrhosis with known esophageal varices s/p banding.  Review of [facility] medical record showed an EGD with Dr. Trivedi 7/2019 where grade II esophageal varices were seen.  Per review of [facility] medical records, he has a history of esphageal banding on two occasions, last in 2018.  Cirrhosis secondary to ETOH with recent cessation about one month ago.  He had been previously on Coreg per [facility] hepatology.    Emergent EGD today showed two columns of medium varices, one with active oozing.  Four bands applied with cessation of bleeding at conclusion of the procedure.  He was also noted to have actively oozing portal hypertensive gastropathy.      Past Medical History:  Past Medical History:   Diagnosis Date    Alcoholic cirrhosis of liver     Arthritis     ATN (acute tubular necrosis)     Diverticulitis 01/2020    GI bleed     Hip arthritis     Left    Hypertension     Macrocytic anemia     Pulmonary embolism 08/2018    Unprovoked DVT.  Stop Coumadin due to GIB    Thrombocytopenia        Allergies:   Review of patient's allergies indicates:   Allergen Reactions    Ciprofloxacin hcl Hallucinations    Morphine Itching       Current Medications:  Medications Prior to Admission   Medication Sig Dispense Refill Last Dose    amLODIPine (NORVASC) 5 MG tablet Take 1 tablet (5 mg total) by mouth once daily. (Patient taking differently: Take 10 mg by mouth once daily. ) 30 tablet 1 4/7/2020    carvediloL  (COREG) 6.25 MG tablet Take 6.25 mg by mouth 2 (two) times daily with meals.   4/7/2020    lisinopril 10 MG tablet Take 10 mg by mouth once daily.   4/7/2020    pantoprazole (PROTONIX) 40 MG tablet Take 1 tablet (40 mg total) by mouth once daily. 30 tablet 1 4/7/2020    potassium chloride (KLOR-CON) 20 mEq Pack Take 10 mEq by mouth once daily.   4/7/2020    ondansetron (ZOFRAN-ODT) 4 MG TbDL Take 1 tablet (4 mg total) by mouth every 6 (six) hours as needed. 20 tablet 0 More than a month         Social History:  Social History     Socioeconomic History    Marital status:      Spouse name: Not on file    Number of children: Not on file    Years of education: Not on file    Highest education level: Not on file   Occupational History    Not on file   Social Needs    Financial resource strain: Not on file    Food insecurity:     Worry: Not on file     Inability: Not on file    Transportation needs:     Medical: Not on file     Non-medical: Not on file   Tobacco Use    Smoking status: Former Smoker    Smokeless tobacco: Never Used    Tobacco comment: quit 20 years ago   Substance and Sexual Activity    Alcohol use: Not Currently     Comment: freq    Drug use: Not Currently     Types: Marijuana    Sexual activity: Yes     Comment: occ   Lifestyle    Physical activity:     Days per week: Not on file     Minutes per session: Not on file    Stress: Not on file   Relationships    Social connections:     Talks on phone: Not on file     Gets together: Not on file     Attends Congregation service: Not on file     Active member of club or organization: Not on file     Attends meetings of clubs or organizations: Not on file     Relationship status: Not on file   Other Topics Concern    Not on file   Social History Narrative    Not on file       Surgical History:  Past Surgical History:   Procedure Laterality Date    ANKLE SURGERY      COLON SURGERY  2007    [facility]  09/05/2019    [facility]     ESOPHAGOGASTRODUODENOSCOPY N/A 7/26/2019    Procedure: EGD (ESOPHAGOGASTRODUODENOSCOPY);  Surgeon: Brian Trivedi MD;  Location: [facility];  Service: Endoscopy;  Laterality: N/A;    HERNIA REPAIR Left     Inguinal         Family History:  Family History   Problem Relation Age of Onset    Hypertension Mother     Cancer Mother     Hypertension Father     Cancer Father        Review of systems:     CONSTITUTIONAL: Negative for fever, chills, weakness, weight loss, weight gain.  HEENT: Negative for blurred vision, hearing loss, nasal congestion, dry mouth, sore throat.  CARDIOVASCULAR: Negative for chest pain or palpitations.  RESPIRATORY: Negative for SOB or cough.  GASTROINTESTINAL: See HPI  GENITOURINARY: Negative for dysuria or hematuria.  MUSCULOSKELETAL: Negative for osteoarthritis or muscle pain.  SKIN: Negative for rashes/lesions.  NEUROLOGIC: Negative for headaches, numbness/tingling.  ENDOCRINE: Negative for diabetes or thyroid abnormalities.  HEMATOLOGIC: Negative for anemia or blood dyscrasias.  Aside from above positives, complete 10 point review of systems negative.    Physical Exam:  Vital Signs (Most Recent):  Temp: 99 °F (37.2 °C) (04/08/20 1205)  Pulse: 92 (04/08/20 1336)  Resp: 20 (04/08/20 1336)  BP: (!) 90/44 (04/08/20 1336)  SpO2: 97 % (04/08/20 1336) Vital Signs (24h Range):  Temp:  [99 °F (37.2 °C)] 99 °F (37.2 °C)  Pulse:  [] 92  Resp:  [17-20] 20  SpO2:  [97 %-100 %] 97 %  BP: ()/(44-78) 90/44       General: Well developed, well nourished, male in no acute distress.    Eyes:  Anicteric sclera, PERRLA  ENT:  Moist mucous membranes, no drainage from ears or nose, hearing grossly intact  Lymph:  No cervical, supraclavicular or axillary lymphadenopathy  Neck:  Supple, no nodes or masses felt, no thyromegaly  Cardiovascular:  Regular rate and rhythm without murmur  Lungs:  Clear to auscultation with normal effort; no wheezes or rales noted  GI:  Soft, NTND, normal bowel  sounds  Musculoskeletal:  5/5 strength bilaterally  Extremities: No clubbing, cyanosis, or edema, 2+ dorsalis pedis bilaterally  Neurologic:  No focal deficits, alert and oriented x 3  Psych:  Appropriate mood and affect  Skin:  No rash, no pallor, no lesions       Labs:  Results for MARIA ISABEL LUX (MRN [id number]) as of 4/8/2020 15:02   Ref. Range 4/8/2020 12:15   WBC Latest Ref Range: 3.90 - 12.70 K/uL 10.86   RBC Latest Ref Range: 4.60 - 6.20 M/uL 2.58 (L)   Hemoglobin Latest Ref Range: 14.0 - 18.0 g/dL 9.5 (L)   Hematocrit Latest Ref Range: 40.0 - 54.0 % 29.0 (L)   MCV Latest Ref Range: 82 - 98 fL 112 (H)   MCH Latest Ref Range: 27.0 - 31.0 pg 36.8 (H)   MCHC Latest Ref Range: 32.0 - 36.0 g/dL 32.8   RDW Latest Ref Range: 11.5 - 14.5 % 13.5   Platelets Latest Ref Range: 150 - 350 K/uL 80 (L)   Results for MARIA ISABEL LUX (MRN [id number]) as of 4/8/2020 15:02   Ref. Range 4/8/2020 12:15   Protime Latest Ref Range: 9.0 - 12.5 sec 14.1 (H)   INR Latest Ref Range: 0.8 - 1.2  1.3 (H)   aPTT Latest Ref Range: 21.0 - 32.0 sec 32.0   Results for MARIA ISABEL LUX (MRN [id number]) as of 4/8/2020 15:02   Ref. Range 4/8/2020 12:15   Sodium Latest Ref Range: 136 - 145 mmol/L 136   Potassium Latest Ref Range: 3.5 - 5.1 mmol/L 3.8   Chloride Latest Ref Range: 95 - 110 mmol/L 97   CO2 Latest Ref Range: 23 - 29 mmol/L 12 (L)   Anion Gap Latest Ref Range: 8 - 16 mmol/L 27 (H)   BUN, Bld Latest Ref Range: 6 - 20 mg/dL 13   Creatinine Latest Ref Range: 0.5 - 1.4 mg/dL 1.7 (H)   eGFR if non African American Latest Ref Range: >60 mL/min/1.73 m^2 48 (A)   eGFR if African American Latest Ref Range: >60 mL/min/1.73 m^2 55 (A)   Glucose Latest Ref Range: 70 - 110 mg/dL 57 (L)   Calcium Latest Ref Range: 8.7 - 10.5 mg/dL 9.3   Alkaline Phosphatase Latest Ref Range: 55 - 135 U/L 100   PROTEIN TOTAL Latest Ref Range: 6.0 - 8.4 g/dL 8.8 (H)   Albumin Latest Ref Range: 3.5 - 5.2 g/dL 3.3 (L)   BILIRUBIN TOTAL Latest Ref Range: 0.1 - 1.0  mg/dL 4.9 (H)   AST Latest Ref Range: 10 - 40 U/L 119 (H)   ALT Latest Ref Range: 10 - 44 U/L 48 (H)   Troponin I Latest Ref Range: 0.000 - 0.026 ng/mL 0.043 (H)       Imaging and Other Studies:  Single frontal view of the chest was performed.    COMPARISON:  05/11/2019.    FINDINGS:  The heart is not enlarged.  Superior mediastinal structures are unremarkable.  Pulmonary vasculature is within normal limits.  The lungs are free of focal consolidations.  There is no evidence for pneumothorax or large pleural effusions.  Bony structures are grossly intact.      Impression       No acute chest disease identified         Assessment:  45 yo with known history of ETOH cirrhosis, esophageal banding x 2 (last 2018) who presents with large volume hematemesis.  EGD with actively bleeding esophageal varices s/p banding x 4 and portal hypertensive gastropathy.    Plan:  1.  Hold NPO  2.  Pantoprazole infusion  3.  Octreotide infusion  4.  Rocephin 1 gram every 24 hours for SBP prophylaxis  5.  Serial Hb/Hct, trasfuse for Hb less than 7.  6.  Patient lost approx 500 cc of kenia blood during procedure.  He is tachy at conclusion of procedure with stable BP.  Plan to transfuse 1 unit of PRBCs upon arrival to the ICU.  7.  Thiamine, folate, MVI  8.  DT precautions.  AST/ALT elevated consistent with ongoing ETOH use.    Case discussed with Dr. Salinas from hospital medicine who will be admitting the patient.    Donn Acuna

## 2020-04-08 NOTE — ED TRIAGE NOTES
"+hematemesis 30  Min PTA. Pt reports " vomiting about 1 pint of blood". Pt reports SOB and weakness but denies fever, chills, cp, abdominal pain. Pt talking in complete sentences, answering questions appropriately. Pt actively vomiting blood upon arrival   "

## 2020-04-08 NOTE — ED PROVIDER NOTES
"Encounter Date: 4/8/2020    SCRIBE #1 NOTE: I, Mechelle Kevin, am scribing for, and in the presence of, Dr. Fernandes.       History     Chief Complaint   Patient presents with    Hematemesis     pt states " I threw up about a pint of blood about 30 min ago". PMH GI bleed. pt reports weakness.      Time seen by provider: 12:15 PM    This is a 44 y.o. male who presents due to episode of large volume hematemesis just prior to arrival. He reports associated weakness and shortness of breath. He states that he was in his usual state of health and denies any easy bruising, bright red blood per rectum, black or tarry stools, or other bleeding prior to episode. He has a history of esophageal varices and upper GI bleed. He had another episode of hematemesis in the Emergency Department. He states that he stopped drinking alcohol about a month ago. He has no other acute complaints at the time.    The history is provided by the patient and medical records.     Review of patient's allergies indicates:   Allergen Reactions    Ciprofloxacin hcl Hallucinations    Morphine Itching     Past Medical History:   Diagnosis Date    Alcoholic cirrhosis of liver     Arthritis     GI bleed     Hypertension     Pulmonary embolism      Past Surgical History:   Procedure Laterality Date    COLON SURGERY      ESOPHAGOGASTRODUODENOSCOPY N/A 7/26/2019    Procedure: EGD (ESOPHAGOGASTRODUODENOSCOPY);  Surgeon: Brian Trivedi MD;  Location: [facility];  Service: Endoscopy;  Laterality: N/A;    HERNIA REPAIR       History reviewed. No pertinent family history.  Social History     Tobacco Use    Smoking status: Former Smoker    Smokeless tobacco: Never Used    Tobacco comment: quit 20 years ago   Substance Use Topics    Alcohol use: Not Currently     Comment: freq    Drug use: Not Currently     Types: Marijuana     ROS: As per HPI and below:   General: No fever.   HENT: No facial pain.   Eyes: Negative for eye pain.   Cardiovascular: No " chest pain.   Respiratory:  Notes dyspnea.   GI: No abdominal pain. Notes large volume hematemesis. No diarrhea. No bright red blood per rectum. No black or tarry stools.  Skin: No rashes. No easy bruising.  Neuro:  Notes weakness. No syncope.  No focal deficits.   Musculoskeletal: No extremity pain.  All other systems reviewed and are negative.    Physical Exam     Initial Vitals [04/08/20 1205]   BP Pulse Resp Temp SpO2   (!) 105/52 (!) 128 18 99 °F (37.2 °C) 100 %      MAP       --         Nursing note and vitals reviewed.  BP (!) 95/54   Pulse 94   Temp 99 °F (37.2 °C) (Oral)   Resp 17   Ht 6' (1.829 m)   Wt 86.6 kg (191 lb)   SpO2 97%   BMI 25.90 kg/m²   Constitutional: AAOx3. Acutely ill-appearing. 200-300 cc's bright red blood in emesis bag.  Eyes: EOMI. No discharge. Anicteric.  HENT:   Mouth: Bright red blood in oropharynx.  Neck: Normal range of motion. Neck supple.  Cardiovascular: Tachycardic rate.  Regular rhythm.    Pulmonary/Chest: No respiratory distress. Effort normal.  No tachypnea.  Abdominal: Soft. No distension and no mass. There is no tenderness. There is no rebound, no guarding, no tenderness at McBurney's point.  Musculoskeletal: Normal range of motion.   Neurological: GCS 15. Alert and oriented to person, place, and time. No gross cranial nerve, light touch or strength deficit. Coordination normal.   Skin: Skin is warm and dry.   EXT: 2+ radial pulses.   Psychiatric: Behavior is normal. Judgment normal.    ED Course   Procedures  Labs Reviewed   CBC W/ AUTO DIFFERENTIAL - Abnormal; Notable for the following components:       Result Value    RBC 2.58 (*)     Hemoglobin 9.5 (*)     Hematocrit 29.0 (*)     Mean Corpuscular Volume 112 (*)     Mean Corpuscular Hemoglobin 36.8 (*)     Platelets 80 (*)     All other components within normal limits   COMPREHENSIVE METABOLIC PANEL - Abnormal; Notable for the following components:    CO2 12 (*)     Glucose 57 (*)     Creatinine 1.7 (*)      Total Protein 8.8 (*)     Albumin 3.3 (*)     Total Bilirubin 4.9 (*)      (*)     ALT 48 (*)     Anion Gap 27 (*)     eGFR if  55 (*)     eGFR if non  48 (*)     All other components within normal limits   PROTIME-INR - Abnormal; Notable for the following components:    Prothrombin Time 14.1 (*)     INR 1.3 (*)     All other components within normal limits   TROPONIN I - Abnormal; Notable for the following components:    Troponin I 0.043 (*)     All other components within normal limits   POCT GLUCOSE - Abnormal; Notable for the following components:    POCT Glucose 54 (*)     All other components within normal limits   SARS-COV-2 RNA AMPLIFICATION, QUAL   APTT   TROPONIN I   POCT GLUCOSE, HAND-HELD DEVICE   POCT GLUCOSE, HAND-HELD DEVICE   POCT GLUCOSE, HAND-HELD DEVICE   TYPE & SCREEN          Imaging Results          X-Ray Chest AP Portable (Final result)  Result time 04/08/20 12:52:04    Final result by Lv Archer MD (04/08/20 12:52:04)                 Impression:      No acute chest disease identified.      Electronically signed by: Lv Archer MD  Date:    04/08/2020  Time:    12:52             Narrative:    EXAMINATION:  XR CHEST AP PORTABLE    CLINICAL HISTORY:  Suspected Covid-19 Virus Infection;    TECHNIQUE:  Single frontal view of the chest was performed.    COMPARISON:  05/11/2019.    FINDINGS:  The heart is not enlarged.  Superior mediastinal structures are unremarkable.  Pulmonary vasculature is within normal limits.  The lungs are free of focal consolidations.  There is no evidence for pneumothorax or large pleural effusions.  Bony structures are grossly intact.                                            Scribe Attestation:   Scribe #1: I performed the above scribed service and the documentation accurately describes the services I performed. I attest to the accuracy of the note.    Attending Attestation:           Physician Attestation for  Scribe:  Physician Attestation Statement for Scribe #1: I, Dr. Fernandes, reviewed documentation, as scribed by Mechelle Kevin in my presence, and it is both accurate and complete.                 ED Course as of Apr 08 1321   Wed Apr 08, 2020   1223 Patient is a 44-year-old male with history of alcoholic cirrhosis (denies recent alcohol use, reports sobriety for some time), esophageal varices, upper GI bleed who presents with large vomiting hematemesis prior to arrival associated with shortness of breath and generalized weakness.  He had an episode of hematemesis in the emergency department.  On exam patient tachycardic, with initial normal blood pressure.  He had no abdominal tenderness.  Initial differential included acute blood loss anemia, variceal bleeding, gastritis, coagulopathy.Paging GI.    [RC]   1231 Patient history, findings, results discussed with Dr. Acuna who requests adding octreotide, to hold transfusions for now, and requests admission to hospital.    [SF]   1242 Patient history, findings, results discussed with hospitalist, Dr. Becker, who accepts patient pending confirmation that there is an ICU bed available.    [SF]   1246 House supervisor confirms there is an ICU bed available.    [SF]   1249 Calling hospitalist.    [SF]   1249 I have discussed the patient history, exam, findings with Dr. Becker who accepts the patient to Dr. Whitney.    [SF]   1302 I independently reviewed and interpreted labs which are notable for acute anemia with Hb 9.5, elevated troponin of 0.043, acute kidney injury with Cr 1.7, anion gap metabolic acidosis with CO2 of 12, mild hypoglycemia.     I independently reviewed and interpreted CXR which shows no pneumothorax, no focal consolidation, no cardiomegaly, no acute process.  Repeat blood pressure on my reassessment just was 95/64.           [RC]   1319 =====================================  Critical Care:  45 minutes total critical care time was personally spent by me,  exclusive of procedures and separately billable time.   Critical care was necessary to treat or prevent imminent or life-threatening deterioration of the following conditions: hematemesis, hypotension, GI bleed, hypoglycemia, metabolic crisis.   =====================================        [RC]      ED Course User Index  [RC] Donn Fernandes MD  [SF] Mechelle Kevin                Clinical Impression:     1. Tachycardia    2. Acute upper GI bleed                ED Disposition Condition    Admit                           Donn Fernandes MD  04/08/20 9536

## 2020-04-08 NOTE — ASSESSMENT & PLAN NOTE
-likely hypovolemic from UGIB, decreased p.o. intake  -will receive 1 unit PRBCs, followed by banana bag and NS at 125 cc/h while NPO  -ordering urine lytes  -hold lisinopril 10

## 2020-04-08 NOTE — HOSPITAL COURSE
ED reported 200-300 cc of hematemesis in the ED.  He was tachycardic up to 128 with hypotension to 89/44.  H/H 9.5/29 with baseline HGB 9-10.  INR 1.3.  Troponin was elevated at 0.043 and remained stable, likely demand ischemia.  EKGs with sinus tach, but no ischemic changes.  He denied CP.    He was admitted to the ICU and underwent emergent EGD that revealed 2 columns of esophageal varices, 1 with active bleeding.  There was active oozing in the cardia, fundus and gastric body.  S/p banding x4.  He was started on octreotide, Protonix gtts and Rocephin for SBP prophylaxis.  There was no ascites.  He was transfused 1 unit PRBCs due to bleeding seen on EGD.  Post transfusion Hgb remained stable at 7.  Plan to repeat EGD in 4 weeks for banding.    Labs also revealed ENA on CKD 3 with creatinine elevated to 1.7, 2.7, likely ATN from hypovolemia.  Urine lytes c/w prerenal process.  Given NSAID use, urine eosinophils was negative.  Renal U/S without hydro; consistent with medical renal disease.  Baseline creatinine 1.0-1.3.  He was transfused as above, given IVF and Lisinopril 10 was held.  Creatinine improved to 1.7 and was down to 1.4 on discharge.  Patient was instructed to call his nephrologist's office on Monday with blood pressure readings to guide decision whether or not to resume it.

## 2020-04-09 PROBLEM — K57.92 DIVERTICULITIS: Status: RESOLVED | Noted: 2020-01-09 | Resolved: 2020-04-09

## 2020-04-09 PROBLEM — N17.0 ACUTE RENAL FAILURE WITH TUBULAR NECROSIS: Status: RESOLVED | Noted: 2019-05-11 | Resolved: 2020-04-09

## 2020-04-09 PROBLEM — R00.0 TACHYCARDIA: Status: ACTIVE | Noted: 2020-04-09

## 2020-04-09 PROBLEM — E87.6 HYPOKALEMIA: Status: RESOLVED | Noted: 2019-05-11 | Resolved: 2020-04-09

## 2020-04-09 PROBLEM — R74.8 ELEVATED LIPASE: Status: RESOLVED | Noted: 2020-01-11 | Resolved: 2020-04-09

## 2020-04-09 LAB
ALBUMIN SERPL BCP-MCNC: 2.7 G/DL (ref 3.5–5.2)
ALP SERPL-CCNC: 74 U/L (ref 55–135)
ALT SERPL W/O P-5'-P-CCNC: 37 U/L (ref 10–44)
AMPHET+METHAMPHET UR QL: NEGATIVE
ANION GAP SERPL CALC-SCNC: 14 MMOL/L (ref 8–16)
ANISOCYTOSIS BLD QL SMEAR: SLIGHT
AST SERPL-CCNC: 99 U/L (ref 10–40)
BARBITURATES UR QL SCN>200 NG/ML: NEGATIVE
BASOPHILS # BLD AUTO: 0.02 K/UL (ref 0–0.2)
BASOPHILS NFR BLD: 0.2 % (ref 0–1.9)
BASOPHILS NFR BLD: 0.2 % (ref 0–1.9)
BASOPHILS NFR BLD: 0.3 % (ref 0–1.9)
BENZODIAZ UR QL SCN>200 NG/ML: NEGATIVE
BILIRUB SERPL-MCNC: 4.7 MG/DL (ref 0.1–1)
BUN SERPL-MCNC: 21 MG/DL (ref 6–20)
BURR CELLS BLD QL SMEAR: ABNORMAL
BZE UR QL SCN: NEGATIVE
CALCIUM SERPL-MCNC: 7.7 MG/DL (ref 8.7–10.5)
CANNABINOIDS UR QL SCN: NEGATIVE
CHLORIDE SERPL-SCNC: 102 MMOL/L (ref 95–110)
CO2 SERPL-SCNC: 18 MMOL/L (ref 23–29)
CREAT SERPL-MCNC: 2.7 MG/DL (ref 0.5–1.4)
CREAT UR-MCNC: 192.9 MG/DL (ref 23–375)
DIFFERENTIAL METHOD: ABNORMAL
EOSINOPHIL # BLD AUTO: 0.1 K/UL (ref 0–0.5)
EOSINOPHIL NFR BLD: 0.7 % (ref 0–8)
EOSINOPHIL NFR BLD: 0.7 % (ref 0–8)
EOSINOPHIL NFR BLD: 1 % (ref 0–8)
EOSINOPHIL URNS QL WRIGHT STN: NORMAL
ERYTHROCYTE [DISTWIDTH] IN BLOOD BY AUTOMATED COUNT: 17.7 % (ref 11.5–14.5)
ERYTHROCYTE [DISTWIDTH] IN BLOOD BY AUTOMATED COUNT: 17.8 % (ref 11.5–14.5)
ERYTHROCYTE [DISTWIDTH] IN BLOOD BY AUTOMATED COUNT: 18 % (ref 11.5–14.5)
EST. GFR  (AFRICAN AMERICAN): 31 ML/MIN/1.73 M^2
EST. GFR  (NON AFRICAN AMERICAN): 27 ML/MIN/1.73 M^2
ETHANOL UR-MCNC: <10 MG/DL
GLUCOSE SERPL-MCNC: 132 MG/DL (ref 70–110)
HCT VFR BLD AUTO: 21.2 % (ref 40–54)
HCT VFR BLD AUTO: 21.9 % (ref 40–54)
HCT VFR BLD AUTO: 22.6 % (ref 40–54)
HGB BLD-MCNC: 7 G/DL (ref 14–18)
HGB BLD-MCNC: 7.2 G/DL (ref 14–18)
HGB BLD-MCNC: 7.5 G/DL (ref 14–18)
HYPOCHROMIA BLD QL SMEAR: ABNORMAL
IMM GRANULOCYTES # BLD AUTO: 0.02 K/UL (ref 0–0.04)
IMM GRANULOCYTES NFR BLD AUTO: 0.2 % (ref 0–0.5)
IMM GRANULOCYTES NFR BLD AUTO: 0.2 % (ref 0–0.5)
IMM GRANULOCYTES NFR BLD AUTO: 0.3 % (ref 0–0.5)
LACTATE SERPL-SCNC: 1 MMOL/L (ref 0.5–2.2)
LYMPHOCYTES # BLD AUTO: 1 K/UL (ref 1–4.8)
LYMPHOCYTES # BLD AUTO: 1 K/UL (ref 1–4.8)
LYMPHOCYTES # BLD AUTO: 1.1 K/UL (ref 1–4.8)
LYMPHOCYTES NFR BLD: 11.1 % (ref 18–48)
LYMPHOCYTES NFR BLD: 11.5 % (ref 18–48)
LYMPHOCYTES NFR BLD: 13 % (ref 18–48)
MAGNESIUM SERPL-MCNC: 1.5 MG/DL (ref 1.6–2.6)
MCH RBC QN AUTO: 35 PG (ref 27–31)
MCH RBC QN AUTO: 35 PG (ref 27–31)
MCH RBC QN AUTO: 35.1 PG (ref 27–31)
MCHC RBC AUTO-ENTMCNC: 32.9 G/DL (ref 32–36)
MCHC RBC AUTO-ENTMCNC: 33 G/DL (ref 32–36)
MCHC RBC AUTO-ENTMCNC: 33.2 G/DL (ref 32–36)
MCV RBC AUTO: 106 FL (ref 82–98)
MCV RBC AUTO: 106 FL (ref 82–98)
MCV RBC AUTO: 107 FL (ref 82–98)
METHADONE UR QL SCN>300 NG/ML: NEGATIVE
MONOCYTES # BLD AUTO: 0.7 K/UL (ref 0.3–1)
MONOCYTES # BLD AUTO: 0.9 K/UL (ref 0.3–1)
MONOCYTES # BLD AUTO: 0.9 K/UL (ref 0.3–1)
MONOCYTES NFR BLD: 8.9 % (ref 4–15)
MONOCYTES NFR BLD: 9 % (ref 4–15)
MONOCYTES NFR BLD: 9.4 % (ref 4–15)
NEUTROPHILS # BLD AUTO: 5.6 K/UL (ref 1.8–7.7)
NEUTROPHILS # BLD AUTO: 7.1 K/UL (ref 1.8–7.7)
NEUTROPHILS # BLD AUTO: 7.7 K/UL (ref 1.8–7.7)
NEUTROPHILS NFR BLD: 76.4 % (ref 38–73)
NEUTROPHILS NFR BLD: 78.4 % (ref 38–73)
NEUTROPHILS NFR BLD: 78.5 % (ref 38–73)
NRBC BLD-RTO: 0 /100 WBC
OPIATES UR QL SCN: NEGATIVE
OVALOCYTES BLD QL SMEAR: ABNORMAL
PCP UR QL SCN>25 NG/ML: NEGATIVE
PLATELET # BLD AUTO: 56 K/UL (ref 150–350)
PLATELET # BLD AUTO: 57 K/UL (ref 150–350)
PLATELET # BLD AUTO: 61 K/UL (ref 150–350)
PLATELET BLD QL SMEAR: ABNORMAL
PMV BLD AUTO: 11.1 FL (ref 9.2–12.9)
PMV BLD AUTO: 11.2 FL (ref 9.2–12.9)
PMV BLD AUTO: 11.9 FL (ref 9.2–12.9)
POLYCHROMASIA BLD QL SMEAR: ABNORMAL
POTASSIUM SERPL-SCNC: 4.1 MMOL/L (ref 3.5–5.1)
PROT SERPL-MCNC: 6.9 G/DL (ref 6–8.4)
PROT UR-MCNC: 95 MG/DL (ref 0–15)
PROT/CREAT UR: 0.49 MG/G{CREAT} (ref 0–0.2)
RBC # BLD AUTO: 2 M/UL (ref 4.6–6.2)
RBC # BLD AUTO: 2.05 M/UL (ref 4.6–6.2)
RBC # BLD AUTO: 2.14 M/UL (ref 4.6–6.2)
SODIUM SERPL-SCNC: 134 MMOL/L (ref 136–145)
SODIUM UR-SCNC: 26 MMOL/L (ref 20–250)
TARGETS BLD QL SMEAR: ABNORMAL
TOXICOLOGY INFORMATION: NORMAL
UUN UR-MCNC: 313 MG/DL (ref 140–1050)
WBC # BLD AUTO: 7.32 K/UL (ref 3.9–12.7)
WBC # BLD AUTO: 9.06 K/UL (ref 3.9–12.7)
WBC # BLD AUTO: 9.86 K/UL (ref 3.9–12.7)

## 2020-04-09 PROCEDURE — 25000003 PHARM REV CODE 250: Performed by: INTERNAL MEDICINE

## 2020-04-09 PROCEDURE — C1751 CATH, INF, PER/CENT/MIDLINE: HCPCS

## 2020-04-09 PROCEDURE — 93005 ELECTROCARDIOGRAM TRACING: CPT

## 2020-04-09 PROCEDURE — 80053 COMPREHEN METABOLIC PANEL: CPT

## 2020-04-09 PROCEDURE — 83735 ASSAY OF MAGNESIUM: CPT

## 2020-04-09 PROCEDURE — 93010 ELECTROCARDIOGRAM REPORT: CPT | Mod: ,,, | Performed by: INTERNAL MEDICINE

## 2020-04-09 PROCEDURE — 63600175 PHARM REV CODE 636 W HCPCS: Performed by: INTERNAL MEDICINE

## 2020-04-09 PROCEDURE — 36410 VNPNXR 3YR/> PHY/QHP DX/THER: CPT

## 2020-04-09 PROCEDURE — 83605 ASSAY OF LACTIC ACID: CPT

## 2020-04-09 PROCEDURE — 11000001 HC ACUTE MED/SURG PRIVATE ROOM

## 2020-04-09 PROCEDURE — 94761 N-INVAS EAR/PLS OXIMETRY MLT: CPT

## 2020-04-09 PROCEDURE — 76937 US GUIDE VASCULAR ACCESS: CPT

## 2020-04-09 PROCEDURE — 99233 SBSQ HOSP IP/OBS HIGH 50: CPT | Mod: ,,, | Performed by: INTERNAL MEDICINE

## 2020-04-09 PROCEDURE — 36415 COLL VENOUS BLD VENIPUNCTURE: CPT

## 2020-04-09 PROCEDURE — 99233 PR SUBSEQUENT HOSPITAL CARE,LEVL III: ICD-10-PCS | Mod: ,,, | Performed by: INTERNAL MEDICINE

## 2020-04-09 PROCEDURE — 85025 COMPLETE CBC W/AUTO DIFF WBC: CPT

## 2020-04-09 PROCEDURE — 93010 EKG 12-LEAD: ICD-10-PCS | Mod: ,,, | Performed by: INTERNAL MEDICINE

## 2020-04-09 PROCEDURE — C9113 INJ PANTOPRAZOLE SODIUM, VIA: HCPCS | Performed by: INTERNAL MEDICINE

## 2020-04-09 RX ORDER — MAGNESIUM SULFATE 1 G/100ML
1 INJECTION INTRAVENOUS ONCE
Status: COMPLETED | OUTPATIENT
Start: 2020-04-09 | End: 2020-04-09

## 2020-04-09 RX ORDER — SODIUM CHLORIDE 9 MG/ML
INJECTION, SOLUTION INTRAVENOUS ONCE
Status: DISCONTINUED | OUTPATIENT
Start: 2020-04-09 | End: 2020-04-11 | Stop reason: HOSPADM

## 2020-04-09 RX ADMIN — CEFTRIAXONE 1 G: 1 INJECTION, SOLUTION INTRAVENOUS at 04:04

## 2020-04-09 RX ADMIN — TRAMADOL HYDROCHLORIDE 50 MG: 50 TABLET, FILM COATED ORAL at 10:04

## 2020-04-09 RX ADMIN — SODIUM CHLORIDE: 0.9 INJECTION, SOLUTION INTRAVENOUS at 09:04

## 2020-04-09 RX ADMIN — SODIUM CHLORIDE: 0.9 INJECTION, SOLUTION INTRAVENOUS at 03:04

## 2020-04-09 RX ADMIN — Medication 100 MG: at 08:04

## 2020-04-09 RX ADMIN — DEXTROSE 8 MG/HR: 50 INJECTION, SOLUTION INTRAVENOUS at 11:04

## 2020-04-09 RX ADMIN — MAGNESIUM SULFATE 1 G: 1 INJECTION INTRAVENOUS at 10:04

## 2020-04-09 RX ADMIN — THERA TABS 1 TABLET: TAB at 08:04

## 2020-04-09 RX ADMIN — OCTREOTIDE ACETATE 50 MCG/HR: 500 INJECTION, SOLUTION INTRAVENOUS; SUBCUTANEOUS at 09:04

## 2020-04-09 RX ADMIN — DEXTROSE 8 MG/HR: 50 INJECTION, SOLUTION INTRAVENOUS at 04:04

## 2020-04-09 RX ADMIN — DEXTROSE 8 MG/HR: 50 INJECTION, SOLUTION INTRAVENOUS at 12:04

## 2020-04-09 RX ADMIN — TRAMADOL HYDROCHLORIDE 50 MG: 50 TABLET, FILM COATED ORAL at 07:04

## 2020-04-09 RX ADMIN — SODIUM CHLORIDE: 0.9 INJECTION, SOLUTION INTRAVENOUS at 11:04

## 2020-04-09 RX ADMIN — DEXTROSE 8 MG/HR: 50 INJECTION, SOLUTION INTRAVENOUS at 05:04

## 2020-04-09 RX ADMIN — DEXTROSE 8 MG/HR: 50 INJECTION, SOLUTION INTRAVENOUS at 09:04

## 2020-04-09 RX ADMIN — FOLIC ACID 1 MG: 1 TABLET ORAL at 09:04

## 2020-04-09 NOTE — ASSESSMENT & PLAN NOTE
-h/o banding x2 in   -Repeat EGD with varices, s/p banding x4  -propranolol was switched to Coreg; hold given hypovolemia/hypotension

## 2020-04-09 NOTE — ASSESSMENT & PLAN NOTE
-likely hypovolemic from UGIB, decreased p.o. intake  -Received 1 unit PRBCs. Will give additional 1L NS followed by  cc/h while NPO  -urine lytes c/w prerenal process  -hold lisinopril 10  -Worsened.  Likely ATN from hypovolemia  -check renal ultrasound, urine eosinophils given NSAID use  -if renal function continues to worsen, will consult Nephrology

## 2020-04-09 NOTE — ASSESSMENT & PLAN NOTE
-likely demand ischemia from hypovolemia, GI bleed  -no CP  -Troponins have remained stable. EKG with sinus tach, but no ischemic changes

## 2020-04-09 NOTE — ASSESSMENT & PLAN NOTE
-2/2 cirrhosis  -has UGIB, but no need for platelet transfusion at this time  -continue to monitor

## 2020-04-09 NOTE — ASSESSMENT & PLAN NOTE
-mildly elevated, 2:1 ratio.  Improved  -Likely 2/2 ETOH and cirrhosis  -hepatitis panel NR   -monitor

## 2020-04-09 NOTE — PLAN OF CARE
SW spoke to pt and completed discharge assessment, verified PCP and uses [facility] on [address] and would like bedside delivery.  Pt lives with parents and father will provide transportation home.  Pt reported that he has a LW but didn't bring to hospital and has no POA.  Pt stated that a nurse come to his home once a week from [facility].  Pt has no DME.  No needs identified at this time.     04/09/20 0922   Discharge Assessment   Assessment Type Discharge Planning Assessment   Confirmed/corrected address and phone number on facesheet? Yes   Assessment information obtained from? Patient   Communicated expected length of stay with patient/caregiver no   Prior to hospitilization cognitive status: Alert/Oriented   Prior to hospitalization functional status: Independent   Current cognitive status: Alert/Oriented   Current Functional Status: Independent   Lives With parent(s)   Able to Return to Prior Arrangements yes   Is patient able to care for self after discharge? Unable to determine at this time (comments)   Readmission Within the Last 30 Days no previous admission in last 30 days   Patient currently being followed by outpatient case management? No   Patient currently receives any other outside agency services? Yes   Name and contact number of agency or person providing outside services [facility]   Is it the patient/care giver preference to resume care with the current outside agency? Yes   Equipment Currently Used at Home none   Do you have any problems affording any of your prescribed medications? No   Is the patient taking medications as prescribed? yes   Does the patient have transportation home? Yes   Transportation Anticipated family or friend will provide   Does the patient receive services at the [facility]? No   Discharge Plan A Home   DME Needed Upon Discharge  none   Patient/Family in Agreement with Plan yes

## 2020-04-09 NOTE — PLAN OF CARE
Received patient from PACU. Patient is awake, alert, and oriented. Able to ambulate from stretcher to bed safely and without difficulties. Vital signs WNL. Afebrile. Cardiac monitor continues. No complaints of pain. Peripheral IV and midline intact. Tolerating diet well. No nausea or vomiting. Voids spontaneously per urinal. Reviewed plan of care with patient. Reassurance was provided. Call light within reach. Continue monitoring.

## 2020-04-09 NOTE — PROGRESS NOTES
[facility] Hospital Medicine  Progress Note    Patient Name: Irvin Diaz  MRN: [id number]  Patient Class: IP- Inpatient   Admission Date: 4/8/2020  Length of Stay: 1 days  Attending Physician: Ayah Whitney MD  Primary Care Provider: [facility]        Subjective:     Principal Problem:Acute upper GI bleed        HPI:  Irvin Diaz is a 44 y.o. male with PMHx alcoholic cirrhosis, varices (s/p banding x2 ), gastritis, microcytic anemia, thrombocytopenia, PE, CKD 3 who presented to the ED d/t hematemesis.    USOH:  He lives with his significant other.  On disability/SSI.  PCP at [facility].    Yesterday, he felt fatigued with exertion.  He did not eat yesterday or today due to decreased appetite.  Took Aleeve x 2 yesterday d/t headache.  He also reported palpitations late last night.  Denied dizziness, lightheadedness, SOB, CP.  He also did not eat anything this morning due to decreased appetite and started to have episodes of hematemesis around noon.  It initially appeared like coffee ground emesis which progressed to bright red hematemesis right before and in the ED.    He has mild diffuse abd pain.  No hematochezia, melena or diarrhea.     He has a history of ETOH cirrhosis.  Prior imaging was not concerning for HCC.  He underwent EGD  due to coffee-ground emesis which revealed grade II varices, grade B reflux esophagitis, chronic gastritis.  S/p banding x2 in 3-2018.  Follows with [facility] at [facility].  He was on propranolol, but this was switched to Coreg by nephrology.  He has a history of ETOH abuse and used to drink a pint of gin daily.  Last drink reportedly 1 month ago.  H/o withdrawals when he initially quit, but no hospitalizations for this.  No h/o seizures. No illicit drug use.  H/o C scope at [facility]  with diverticulosis.    He has a history of unprovoked PE, DVT .  He was on Coumadin, but this was discontinued  due to GI bleed.  He underwent hemophilia workup with [facility].    He has chronic hip pain.  Was unable to perform hip replacement due to cirrhosis.  Used to be on tramadol.    He has a history of ATN thought to be from volume depletion from colitis and ETOH use.  He follows with Nephrology, Dr. Samson.    Overview/Hospital Course:  ED reported 200-300 cc of hematemesis in the ED.  He was tachycardic up to 128 with hypotension to 89/44.  H/H 9.5/29 with baseline HGB 9-10.  INR 1.3.  Troponin was elevated at 0.043 and remained stable, likely demand ischemia.  EKGs with sinus tach, but no ischemic changes.  He denied CP.    He was admitted to the ICU and underwent emergent EGD that revealed 2 columns of esophageal varices, 1 with active bleeding.  There was active oozing in the cardia, fundus and gastric body.  S/p banding x4.  He was started on octreotide, Protonix gtts and Rocephin for SBP prophylaxis.  There was no ascites.  He was transfused 1 unit PRBCs due to bleeding seen on EGD.  Post transfusion Hgb remained stable at 7.5.  Plan to repeat EGD in 4 weeks for banding.    Labs also revealed ENA on CKD 3 with creatinine elevated to 1.7, 2.7, likely ATN from hypovolemia.  Urine lytes c/w prerenal process.  Urine eos, Renal U/S ordered.  Baseline creatinine 1.0-1.3.  He was transfused as above, given banana bag and IVF.  Lisinopril 10 was held.    Interval History: NAEON.  Received 1 uPRBCs last night with stable Hgb 7.  No nausea, vomiting, abdominal pain.  Had 1 BM with melena.  Headache, hip pain alleviated by tramadol.  Denies CP, SOB.  Palpitations have improved.    Review of Systems   Constitutional: Negative for activity change, appetite change and fever.   HENT: Negative for rhinorrhea and sneezing.    Eyes: Negative for discharge and redness.   Respiratory: Negative for cough and shortness of breath.    Cardiovascular: Positive for palpitations. Negative for chest pain and leg swelling.    Gastrointestinal: Positive for blood in stool. Negative for abdominal pain, nausea and vomiting.   Genitourinary: Negative for difficulty urinating and dysuria.   Musculoskeletal: Positive for arthralgias. Negative for gait problem.   Skin: Negative for rash and wound.   Neurological: Negative for dizziness and light-headedness.   Hematological: Negative for adenopathy.   Psychiatric/Behavioral: Negative for confusion. The patient is not nervous/anxious.      Objective:     Vital Signs (Most Recent):  Temp: (P) 98.5 °F (36.9 °C) (04/09/20 0700)  Pulse: 110 (04/09/20 0645)  Resp: 18 (04/09/20 0645)  BP: 134/69 (04/09/20 0645)  SpO2: 100 % (04/09/20 0645) Vital Signs (24h Range):  Temp:  [98.4 °F (36.9 °C)-99 °F (37.2 °C)] (P) 98.5 °F (36.9 °C)  Pulse:  [] 110  Resp:  [17-20] 18  SpO2:  [97 %-100 %] 100 %  BP: ()/(44-78) 134/69     Weight: 86.6 kg (191 lb)  Body mass index is 25.9 kg/m².    Intake/Output Summary (Last 24 hours) at 4/9/2020 1006  Last data filed at 4/8/2020 2315  Gross per 24 hour   Intake 2550 ml   Output 201 ml   Net 2349 ml      Physical Exam   Constitutional: He appears well-developed and well-nourished. No distress.   HENT:   Head: Normocephalic and atraumatic.   Dry mucous membranes.  Sublingual jaundice   Eyes: Conjunctivae and EOM are normal. Right eye exhibits no discharge. Left eye exhibits no discharge. Scleral icterus is present.   Neck: No tracheal deviation present.   Cardiovascular: Regular rhythm and normal heart sounds.   No murmur heard.  Tachycardic to low 100s   Pulmonary/Chest: Effort normal and breath sounds normal. No stridor. No respiratory distress. He has no wheezes.   Abdominal: Soft. Bowel sounds are normal. He exhibits no distension. There is no tenderness.   Musculoskeletal: He exhibits no edema or tenderness.   Neurological: He is alert. No cranial nerve deficit.   Skin: Skin is warm. No rash noted. He is not diaphoretic.   Psychiatric: He has a normal mood  and affect. His behavior is normal.       Significant Labs:   CBC:   Recent Labs   Lab 04/08/20  1729 04/08/20  2308 04/09/20  0517   WBC 10.09 11.32 9.86   HGB 7.7* 7.9* 7.5*   HCT 24.2* 24.0* 22.6*   PLT 65* 60* 61*     CMP:   Recent Labs   Lab 04/08/20  1215 04/09/20  0517    134*   K 3.8 4.1   CL 97 102   CO2 12* 18*   GLU 57* 132*   BUN 13 21*   CREATININE 1.7* 2.7*   CALCIUM 9.3 7.7*   PROT 8.8* 6.9   ALBUMIN 3.3* 2.7*   BILITOT 4.9* 4.7*   ALKPHOS 100 74   * 99*   ALT 48* 37   ANIONGAP 27* 14   EGFRNONAA 48* 27*     Cardiac Markers: No results for input(s): CKMB, MYOGLOBIN, BNP, TROPISTAT in the last 48 hours.  Lactic Acid:   Recent Labs   Lab 04/08/20  1729   LACTATE 8.6*       Significant Imaging: No new imaging      Assessment/Plan:      * Acute upper GI bleed  -with hypotension 89/44, tachycardic to 128 on admission.  Hypotension has resolved.  Will give additional IV fluids due to tachycardia.  -Hgb baseline 9-10.  Received 1 unit PRBCs due to active bleeding on EGD.  HGB has remained stable at 7.5  -GI following.  EGD with varices, oozing.  S/p banding x 4  -will need repeat EGD in 4 weeks  -Cont PPI, octreotide gtt  -continue Rocephin for SBP prophylaxis for 7 days.  No ascites.  -CBC q6h, vitals q.4 hours  -will discuss with GI potential step-down and diet    Hip arthritis  -unable to perform outpatient hip replacement due to cirrhosis  -avoid NSAIDs due to GI bleed  -tramadol p.r.n.      Elevated troponin level  -likely demand ischemia from hypovolemia, GI bleed  -no CP  -Troponins have remained stable. EKG with sinus tach, but no ischemic changes    CKD (chronic kidney disease) stage 3, GFR 30-59 ml/min  -see ENA  -follows with Nephrology, Dr. Samson    High anion gap metabolic acidosis  -possibly from ENA, lactic acidosis.  Lactic acid 8.6.  Received 1 unit PRBCs, IV fluids.  Will give additional bolus and recheck lactic acid  -improving with fluid resuscitation  -ETOH, tox  neg      Macrocytic anemia  -iron panel  with ferritin 160, TIBC low.  B12, folate WNL  -see GI bleed      Alcoholic cirrhosis of liver  -follows with [facility] at [facility]  -prior imaging not c/w HCC  -see GI bleed      Essential hypertension  -initially with hypotension, hypovolemia from GI bleed  -hold Coreg 12.5 b.i.d., Norvasc 10  -hold lisinopril 10 due to ENA  -BP currently controlled off antihypertensives  -will monitor      ENA (acute kidney injury)  -likely hypovolemic from UGIB, decreased p.o. intake  -Received 1 unit PRBCs. Will give additional 1L NS followed by  cc/h while NPO  -urine lytes c/w prerenal process  -hold lisinopril 10  -Worsened.  Likely ATN from hypovolemia  -check renal ultrasound, urine eosinophils given NSAID use  -if renal function continues to worsen, will consult Nephrology      Hypomagnesemia  -monitor and replete p.r.n.      Transaminitis  -mildly elevated, 2:1 ratio.  Improved  -Likely 2/2 ETOH and cirrhosis  -hepatitis panel NR   -monitor    Esophageal varices  -h/o banding x2 in   -Repeat EGD with varices, s/p banding x4  -propranolol was switched to Coreg; hold given hypovolemia/hypotension      History of pulmonary embolus (PE)  -unprovoked  -underwent outpatient hemophilia workup with Hematology at [facility]  -Coumadin was discontinued due to GI bleed  -IAIN hose, SCDs d/t GIB      Thrombocytopenia  -2/2 cirrhosis  -has UGIB, but no need for platelet transfusion at this time  -continue to monitor        VTE Risk Mitigation (From admission, onward)         Ordered     Place IAIN hose  Until discontinued      04/08/20 1617     IP VTE HIGH RISK PATIENT  Once      04/08/20 1617     Place IAIN hose  Until discontinued      04/08/20 1617     Place sequential compression device  Until discontinued      04/08/20 1617     Reason for no Mechanical VTE Prophylaxis  Once     Question:  Reasons:  Answer:  Risk of Bleeding    04/08/20 1617                      Cyndee Salinas,  MD  Department of Hospital Medicine   [facility]

## 2020-04-09 NOTE — ASSESSMENT & PLAN NOTE
-follows with [facility]tar L[facility]PE[facility] at [facility]  -prior imaging not c/w HCC  -see GI bleed

## 2020-04-09 NOTE — ASSESSMENT & PLAN NOTE
-with hypotension 89/44, tachycardic to 128 on admission.  Hypotension has resolved.  Will give additional IV fluids due to tachycardia.  -Hgb baseline 9-10.  Received 1 unit PRBCs due to active bleeding on EGD.  HGB has remained stable at 7.5  -GI following.  EGD with varices, oozing.  S/p banding x 4  -will need repeat EGD in 4 weeks  -Cont PPI, octreotide gtt  -continue Rocephin for SBP prophylaxis for 7 days.  No ascites.  -CBC q6h, vitals q.4 hours  -will discuss with GI potential step-down and diet

## 2020-04-09 NOTE — ASSESSMENT & PLAN NOTE
-initially with hypotension, hypovolemia from GI bleed  -hold Coreg 12.5 b.i.d., Norvasc 10  -hold lisinopril 10 due to ENA  -BP currently controlled off antihypertensives  -will monitor

## 2020-04-09 NOTE — SUBJECTIVE & OBJECTIVE
Interval History: NAEON.  Received 1 uPRBCs last night with stable Hgb 7.  No nausea, vomiting, abdominal pain.  Had 1 BM with melena.  Headache, hip pain alleviated by tramadol.  Denies CP, SOB.  Palpitations have improved.    Review of Systems   Constitutional: Negative for activity change, appetite change and fever.   HENT: Negative for rhinorrhea and sneezing.    Eyes: Negative for discharge and redness.   Respiratory: Negative for cough and shortness of breath.    Cardiovascular: Positive for palpitations. Negative for chest pain and leg swelling.   Gastrointestinal: Positive for blood in stool. Negative for abdominal pain, nausea and vomiting.   Genitourinary: Negative for difficulty urinating and dysuria.   Musculoskeletal: Positive for arthralgias. Negative for gait problem.   Skin: Negative for rash and wound.   Neurological: Negative for dizziness and light-headedness.   Hematological: Negative for adenopathy.   Psychiatric/Behavioral: Negative for confusion. The patient is not nervous/anxious.      Objective:     Vital Signs (Most Recent):  Temp: (P) 98.5 °F (36.9 °C) (04/09/20 0700)  Pulse: 110 (04/09/20 0645)  Resp: 18 (04/09/20 0645)  BP: 134/69 (04/09/20 0645)  SpO2: 100 % (04/09/20 0645) Vital Signs (24h Range):  Temp:  [98.4 °F (36.9 °C)-99 °F (37.2 °C)] (P) 98.5 °F (36.9 °C)  Pulse:  [] 110  Resp:  [17-20] 18  SpO2:  [97 %-100 %] 100 %  BP: ()/(44-78) 134/69     Weight: 86.6 kg (191 lb)  Body mass index is 25.9 kg/m².    Intake/Output Summary (Last 24 hours) at 4/9/2020 1006  Last data filed at 4/8/2020 2315  Gross per 24 hour   Intake 2550 ml   Output 201 ml   Net 2349 ml      Physical Exam   Constitutional: He appears well-developed and well-nourished. No distress.   HENT:   Head: Normocephalic and atraumatic.   Dry mucous membranes.  Sublingual jaundice   Eyes: Conjunctivae and EOM are normal. Right eye exhibits no discharge. Left eye exhibits no discharge. Scleral icterus is  present.   Neck: No tracheal deviation present.   Cardiovascular: Regular rhythm and normal heart sounds.   No murmur heard.  Tachycardic to low 100s   Pulmonary/Chest: Effort normal and breath sounds normal. No stridor. No respiratory distress. He has no wheezes.   Abdominal: Soft. Bowel sounds are normal. He exhibits no distension. There is no tenderness.   Musculoskeletal: He exhibits no edema or tenderness.   Neurological: He is alert. No cranial nerve deficit.   Skin: Skin is warm. No rash noted. He is not diaphoretic.   Psychiatric: He has a normal mood and affect. His behavior is normal.       Significant Labs:   CBC:   Recent Labs   Lab 04/08/20  1729 04/08/20  2308 04/09/20  0517   WBC 10.09 11.32 9.86   HGB 7.7* 7.9* 7.5*   HCT 24.2* 24.0* 22.6*   PLT 65* 60* 61*     CMP:   Recent Labs   Lab 04/08/20  1215 04/09/20  0517    134*   K 3.8 4.1   CL 97 102   CO2 12* 18*   GLU 57* 132*   BUN 13 21*   CREATININE 1.7* 2.7*   CALCIUM 9.3 7.7*   PROT 8.8* 6.9   ALBUMIN 3.3* 2.7*   BILITOT 4.9* 4.7*   ALKPHOS 100 74   * 99*   ALT 48* 37   ANIONGAP 27* 14   EGFRNONAA 48* 27*     Cardiac Markers: No results for input(s): CKMB, MYOGLOBIN, BNP, TROPISTAT in the last 48 hours.  Lactic Acid:   Recent Labs   Lab 04/08/20  1729   LACTATE 8.6*       Significant Imaging: No new imaging

## 2020-04-09 NOTE — ASSESSMENT & PLAN NOTE
-possibly from ENA, lactic acidosis.  Lactic acid 8.6.  Received 1 unit PRBCs, IV fluids.  Will give additional bolus and recheck lactic acid  -improving with fluid resuscitation  -ETOH, tox neg

## 2020-04-09 NOTE — PROGRESS NOTES
Gastroenterology Progress Note    Active Hospital Problems    Tachycardia      *Acute upper GI bleed      High anion gap metabolic acidosis      CKD (chronic kidney disease) stage 3, GFR 30-59 ml/min      Elevated troponin level      Hip arthritis      Essential hypertension      Macrocytic anemia      Alcoholic cirrhosis of liver      ENA (acute kidney injury)      Hypomagnesemia      Transaminitis      Esophageal varices      History of pulmonary embolus (PE)      Thrombocytopenia        Subjective:  Patient seen and examined.  The patient is stable this AM.  No further overt GI blood loss.  Remains slightly hypotensive, being given an additional bolus.  No chest pain. Does complain of a sore throat.  Cr up.    Reviews of Systems:  General:  Negative for fever or chills  Cardiovascular:  Negative for chest pain, shortness of breath, palpitations    Physical Exam    Vitals:  Vital Signs (Most Recent):  Temp: 98.3 °F (36.8 °C) (04/09/20 1100)  Pulse: 97 (04/09/20 1100)  Resp: 18 (04/09/20 0645)  BP: 120/67 (04/09/20 1100)  SpO2: 100 % (04/09/20 1100) Vital Signs (24h Range):  Temp:  [98.3 °F (36.8 °C)-99 °F (37.2 °C)] 98.3 °F (36.8 °C)  Pulse:  [] 97  Resp:  [17-20] 18  SpO2:  [97 %-100 %] 100 %  BP: ()/(44-78) 120/67     GEN: Well developed, well nourished in no apparent distress   HENT: Normocephalic, anicteric sclera   Cardiovascular: Regular rate and rhythm. No murmurs appreciated.   Chest: Non-labored respirations. Breath sounds equal   Abdomen: soft, NTND, no masses  Psych: Appropriate mood and affect.   Extermities: No C/C/E. 2+ dorsalis pedis pulses bilaterally  Skin: No new visible or palpable lesions.      Medications/Infusions:  Current Facility-Administered Medications   Medication Dose Route Frequency Provider Last Rate Last Dose    0.9%  NaCl infusion (for blood administration)   Intravenous Q24H PRN Cyndee Salinas MD        0.9%  NaCl infusion   Intravenous Continuous Cyndee Salinas MD  150 mL/hr at 04/09/20 0952      0.9%  NaCl infusion   Intravenous Once Cyndee Salinas MD        cefTRIAXone (ROCEPHIN) 1 g in dextrose 5 % 50 mL IVPB  1 g Intravenous Q24H Cyndee Salinas MD   1 g at 04/08/20 1649    folic acid tablet 1 mg  1 mg Oral Daily Cyndee Salinas MD   1 mg at 04/09/20 0900    melatonin tablet 6 mg  6 mg Oral Nightly PRN Cyndee Salinas MD   6 mg at 04/08/20 2220    multivitamin tablet  1 tablet Oral Daily Cyndee Salinas MD   1 tablet at 04/09/20 0839    octreotide (SANDOSTATIN) 500 mcg in sodium chloride 0.9% 100 mL infusion  50 mcg/hr Intravenous Continuous Cyndee Salinas MD 10 mL/hr at 04/09/20 0928 50 mcg/hr at 04/09/20 0928    ondansetron injection 4 mg  4 mg Intravenous Q8H PRN Donn Fernandes MD        pantoprazole 40 mg in dextrose 5 % 100 mL infusion (ready to mix system)  8 mg/hr Intravenous Continuous Cyndee Salinas MD 20 mL/hr at 04/09/20 1110 8 mg/hr at 04/09/20 1110    sodium chloride 0.9% flush 10 mL  10 mL Intravenous PRN Donn Fernandes MD        sodium chloride 0.9% flush 3 mL  3 mL Intravenous PRN Cyndee Salinas MD        thiamine tablet 100 mg  100 mg Oral Daily Cyndee Salinas MD   100 mg at 04/09/20 0839    traMADoL tablet 50 mg  50 mg Oral Q6H PRN Cyndee Salinas MD   50 mg at 04/09/20 0705       Intake and Output:    Intake/Output Summary (Last 24 hours) at 4/9/2020 1138  Last data filed at 4/9/2020 1110  Gross per 24 hour   Intake 2550 ml   Output 1151 ml   Net 1399 ml       Labs:    Results for MARIA ISABEL LUX (MRN [id number]) as of 4/9/2020 11:39   Ref. Range 4/9/2020 05:17 4/9/2020 09:55   WBC Latest Ref Range: 3.90 - 12.70 K/uL 9.86 9.06   RBC Latest Ref Range: 4.60 - 6.20 M/uL 2.14 (L) 2.00 (L)   Hemoglobin Latest Ref Range: 14.0 - 18.0 g/dL 7.5 (L) 7.0 (L)   Hematocrit Latest Ref Range: 40.0 - 54.0 % 22.6 (L) 21.2 (L)   MCV Latest Ref Range: 82 - 98 fL 106 (H) 106 (H)   MCH Latest Ref Range: 27.0 - 31.0 pg 35.0 (H) 35.0 (H)   MCHC Latest Ref Range:  32.0 - 36.0 g/dL 33.2 33.0   RDW Latest Ref Range: 11.5 - 14.5 % 17.7 (H) 17.8 (H)   Platelets Latest Ref Range: 150 - 350 K/uL 61 (L) 57 (L)   Results for MARIA ISABEL LUX (MRN [id number]) as of 4/9/2020 11:39   Ref. Range 4/9/2020 05:17   Sodium Latest Ref Range: 136 - 145 mmol/L 134 (L)   Potassium Latest Ref Range: 3.5 - 5.1 mmol/L 4.1   Chloride Latest Ref Range: 95 - 110 mmol/L 102   CO2 Latest Ref Range: 23 - 29 mmol/L 18 (L)   Anion Gap Latest Ref Range: 8 - 16 mmol/L 14   BUN, Bld Latest Ref Range: 6 - 20 mg/dL 21 (H)   Creatinine Latest Ref Range: 0.5 - 1.4 mg/dL 2.7 (H)   eGFR if non African American Latest Ref Range: >60 mL/min/1.73 m^2 27 (A)   eGFR if African American Latest Ref Range: >60 mL/min/1.73 m^2 31 (A)   Glucose Latest Ref Range: 70 - 110 mg/dL 132 (H)   Calcium Latest Ref Range: 8.7 - 10.5 mg/dL 7.7 (L)   Magnesium Latest Ref Range: 1.6 - 2.6 mg/dL 1.5 (L)   Alkaline Phosphatase Latest Ref Range: 55 - 135 U/L 74   PROTEIN TOTAL Latest Ref Range: 6.0 - 8.4 g/dL 6.9   Albumin Latest Ref Range: 3.5 - 5.2 g/dL 2.7 (L)   BILIRUBIN TOTAL Latest Ref Range: 0.1 - 1.0 mg/dL 4.7 (H)   AST Latest Ref Range: 10 - 40 U/L 99 (H)   ALT Latest Ref Range: 10 - 44 U/L 37       Imaging and other studies:    No new    Assessment:  45 yo with known history of ETOH cirrhosis, esophageal banding x 2 (last 2018) who presents with large volume hematemesis.  EGD 4/8 with actively bleeding esophageal varices s/p banding x 4 and portal hypertensive gastropathy.  Stable overnight with the exception of a bump in Cr which appears pre renal.     Plan:    1.  Clear liquid diet.  2.  Continue with Pantoprazole and Octreotide infusion for an additional 48 hours  3.   Continue with Rocephin for SBP prophylaxis  4.  Transfuse 1 unit for Hb less than 7, serial Hb/Hct  5.  Thiamine folate MVI  6.  Agree with volume given pre renal labs.    Stable for floor transfer.  Discussed with Dr. Salinas.

## 2020-04-09 NOTE — ASSESSMENT & PLAN NOTE
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

## 2020-04-10 PROBLEM — R00.0 TACHYCARDIA: Status: RESOLVED | Noted: 2020-04-09 | Resolved: 2020-04-10

## 2020-04-10 LAB
ALBUMIN SERPL BCP-MCNC: 2.7 G/DL (ref 3.5–5.2)
ALP SERPL-CCNC: 79 U/L (ref 55–135)
ALT SERPL W/O P-5'-P-CCNC: 39 U/L (ref 10–44)
ANION GAP SERPL CALC-SCNC: 8 MMOL/L (ref 8–16)
ANISOCYTOSIS BLD QL SMEAR: SLIGHT
ANISOCYTOSIS BLD QL SMEAR: SLIGHT
AST SERPL-CCNC: 118 U/L (ref 10–40)
BASOPHILS # BLD AUTO: 0.01 K/UL (ref 0–0.2)
BASOPHILS # BLD AUTO: 0.02 K/UL (ref 0–0.2)
BASOPHILS # BLD AUTO: 0.02 K/UL (ref 0–0.2)
BASOPHILS NFR BLD: 0.2 % (ref 0–1.9)
BASOPHILS NFR BLD: 0.3 % (ref 0–1.9)
BASOPHILS NFR BLD: 0.4 % (ref 0–1.9)
BILIRUB SERPL-MCNC: 3.6 MG/DL (ref 0.1–1)
BUN SERPL-MCNC: 13 MG/DL (ref 6–20)
BURR CELLS BLD QL SMEAR: ABNORMAL
CALCIUM SERPL-MCNC: 7.9 MG/DL (ref 8.7–10.5)
CHLORIDE SERPL-SCNC: 105 MMOL/L (ref 95–110)
CO2 SERPL-SCNC: 24 MMOL/L (ref 23–29)
CREAT SERPL-MCNC: 1.7 MG/DL (ref 0.5–1.4)
DIFFERENTIAL METHOD: ABNORMAL
EOSINOPHIL # BLD AUTO: 0.1 K/UL (ref 0–0.5)
EOSINOPHIL NFR BLD: 1 % (ref 0–8)
EOSINOPHIL NFR BLD: 1.4 % (ref 0–8)
EOSINOPHIL NFR BLD: 1.5 % (ref 0–8)
ERYTHROCYTE [DISTWIDTH] IN BLOOD BY AUTOMATED COUNT: 17.5 % (ref 11.5–14.5)
ERYTHROCYTE [DISTWIDTH] IN BLOOD BY AUTOMATED COUNT: 17.6 % (ref 11.5–14.5)
ERYTHROCYTE [DISTWIDTH] IN BLOOD BY AUTOMATED COUNT: 17.9 % (ref 11.5–14.5)
EST. GFR  (AFRICAN AMERICAN): 55 ML/MIN/1.73 M^2
EST. GFR  (NON AFRICAN AMERICAN): 48 ML/MIN/1.73 M^2
GLUCOSE SERPL-MCNC: 126 MG/DL (ref 70–110)
HCT VFR BLD AUTO: 20 % (ref 40–54)
HCT VFR BLD AUTO: 21.2 % (ref 40–54)
HCT VFR BLD AUTO: 21.5 % (ref 40–54)
HGB BLD-MCNC: 6.8 G/DL (ref 14–18)
HGB BLD-MCNC: 7 G/DL (ref 14–18)
HGB BLD-MCNC: 7.2 G/DL (ref 14–18)
HYPOCHROMIA BLD QL SMEAR: ABNORMAL
HYPOCHROMIA BLD QL SMEAR: ABNORMAL
IMM GRANULOCYTES # BLD AUTO: 0.01 K/UL (ref 0–0.04)
IMM GRANULOCYTES # BLD AUTO: 0.01 K/UL (ref 0–0.04)
IMM GRANULOCYTES # BLD AUTO: 0.02 K/UL (ref 0–0.04)
IMM GRANULOCYTES NFR BLD AUTO: 0.2 % (ref 0–0.5)
IMM GRANULOCYTES NFR BLD AUTO: 0.2 % (ref 0–0.5)
IMM GRANULOCYTES NFR BLD AUTO: 0.4 % (ref 0–0.5)
LYMPHOCYTES # BLD AUTO: 1 K/UL (ref 1–4.8)
LYMPHOCYTES # BLD AUTO: 1.2 K/UL (ref 1–4.8)
LYMPHOCYTES # BLD AUTO: 1.2 K/UL (ref 1–4.8)
LYMPHOCYTES NFR BLD: 19.4 % (ref 18–48)
LYMPHOCYTES NFR BLD: 19.7 % (ref 18–48)
LYMPHOCYTES NFR BLD: 20.1 % (ref 18–48)
MAGNESIUM SERPL-MCNC: 1.7 MG/DL (ref 1.6–2.6)
MCH RBC QN AUTO: 34.8 PG (ref 27–31)
MCH RBC QN AUTO: 35 PG (ref 27–31)
MCH RBC QN AUTO: 36.2 PG (ref 27–31)
MCHC RBC AUTO-ENTMCNC: 33 G/DL (ref 32–36)
MCHC RBC AUTO-ENTMCNC: 33.5 G/DL (ref 32–36)
MCHC RBC AUTO-ENTMCNC: 34 G/DL (ref 32–36)
MCV RBC AUTO: 104 FL (ref 82–98)
MCV RBC AUTO: 106 FL (ref 82–98)
MCV RBC AUTO: 106 FL (ref 82–98)
MONOCYTES # BLD AUTO: 0.7 K/UL (ref 0.3–1)
MONOCYTES NFR BLD: 11.1 % (ref 4–15)
MONOCYTES NFR BLD: 11.5 % (ref 4–15)
MONOCYTES NFR BLD: 12.8 % (ref 4–15)
NEUTROPHILS # BLD AUTO: 3.4 K/UL (ref 1.8–7.7)
NEUTROPHILS # BLD AUTO: 3.8 K/UL (ref 1.8–7.7)
NEUTROPHILS # BLD AUTO: 4.2 K/UL (ref 1.8–7.7)
NEUTROPHILS NFR BLD: 65.5 % (ref 38–73)
NEUTROPHILS NFR BLD: 66.6 % (ref 38–73)
NEUTROPHILS NFR BLD: 67.7 % (ref 38–73)
NRBC BLD-RTO: 0 /100 WBC
OVALOCYTES BLD QL SMEAR: ABNORMAL
OVALOCYTES BLD QL SMEAR: ABNORMAL
PLATELET # BLD AUTO: 51 K/UL (ref 150–350)
PLATELET # BLD AUTO: 53 K/UL (ref 150–350)
PLATELET # BLD AUTO: 54 K/UL (ref 150–350)
PLATELET BLD QL SMEAR: ABNORMAL
PLATELET BLD QL SMEAR: ABNORMAL
PMV BLD AUTO: 10.8 FL (ref 9.2–12.9)
PMV BLD AUTO: 11.1 FL (ref 9.2–12.9)
PMV BLD AUTO: 11.3 FL (ref 9.2–12.9)
POLYCHROMASIA BLD QL SMEAR: ABNORMAL
POLYCHROMASIA BLD QL SMEAR: ABNORMAL
POTASSIUM SERPL-SCNC: 3.5 MMOL/L (ref 3.5–5.1)
PROT SERPL-MCNC: 6.9 G/DL (ref 6–8.4)
RBC # BLD AUTO: 1.88 M/UL (ref 4.6–6.2)
RBC # BLD AUTO: 2.01 M/UL (ref 4.6–6.2)
RBC # BLD AUTO: 2.06 M/UL (ref 4.6–6.2)
SODIUM SERPL-SCNC: 137 MMOL/L (ref 136–145)
TARGETS BLD QL SMEAR: ABNORMAL
WBC # BLD AUTO: 5.25 K/UL (ref 3.9–12.7)
WBC # BLD AUTO: 5.76 K/UL (ref 3.9–12.7)
WBC # BLD AUTO: 6.19 K/UL (ref 3.9–12.7)

## 2020-04-10 PROCEDURE — C9113 INJ PANTOPRAZOLE SODIUM, VIA: HCPCS | Performed by: INTERNAL MEDICINE

## 2020-04-10 PROCEDURE — 94761 N-INVAS EAR/PLS OXIMETRY MLT: CPT

## 2020-04-10 PROCEDURE — 63600175 PHARM REV CODE 636 W HCPCS: Performed by: INTERNAL MEDICINE

## 2020-04-10 PROCEDURE — 80053 COMPREHEN METABOLIC PANEL: CPT

## 2020-04-10 PROCEDURE — 83735 ASSAY OF MAGNESIUM: CPT

## 2020-04-10 PROCEDURE — 25000003 PHARM REV CODE 250: Performed by: INTERNAL MEDICINE

## 2020-04-10 PROCEDURE — 99233 PR SUBSEQUENT HOSPITAL CARE,LEVL III: ICD-10-PCS | Mod: ,,, | Performed by: INTERNAL MEDICINE

## 2020-04-10 PROCEDURE — 11000001 HC ACUTE MED/SURG PRIVATE ROOM

## 2020-04-10 PROCEDURE — 99900035 HC TECH TIME PER 15 MIN (STAT)

## 2020-04-10 PROCEDURE — 25000003 PHARM REV CODE 250: Performed by: NURSE PRACTITIONER

## 2020-04-10 PROCEDURE — 99233 SBSQ HOSP IP/OBS HIGH 50: CPT | Mod: ,,, | Performed by: INTERNAL MEDICINE

## 2020-04-10 PROCEDURE — 85025 COMPLETE CBC W/AUTO DIFF WBC: CPT | Mod: 91

## 2020-04-10 RX ORDER — PANTOPRAZOLE SODIUM 40 MG/10ML
40 INJECTION, POWDER, LYOPHILIZED, FOR SOLUTION INTRAVENOUS 2 TIMES DAILY
Status: DISCONTINUED | OUTPATIENT
Start: 2020-04-10 | End: 2020-04-11 | Stop reason: HOSPADM

## 2020-04-10 RX ORDER — DOCUSATE SODIUM 100 MG/1
100 CAPSULE, LIQUID FILLED ORAL ONCE
Status: COMPLETED | OUTPATIENT
Start: 2020-04-10 | End: 2020-04-10

## 2020-04-10 RX ADMIN — CEFTRIAXONE 1 G: 1 INJECTION, SOLUTION INTRAVENOUS at 05:04

## 2020-04-10 RX ADMIN — DOCUSATE SODIUM 100 MG: 100 CAPSULE, LIQUID FILLED ORAL at 09:04

## 2020-04-10 RX ADMIN — THERA TABS 1 TABLET: TAB at 09:04

## 2020-04-10 RX ADMIN — OCTREOTIDE ACETATE 50 MCG/HR: 500 INJECTION, SOLUTION INTRAVENOUS; SUBCUTANEOUS at 05:04

## 2020-04-10 RX ADMIN — Medication 6 MG: at 08:04

## 2020-04-10 RX ADMIN — PANTOPRAZOLE SODIUM 40 MG: 40 INJECTION, POWDER, LYOPHILIZED, FOR SOLUTION INTRAVENOUS at 08:04

## 2020-04-10 RX ADMIN — SODIUM CHLORIDE: 0.9 INJECTION, SOLUTION INTRAVENOUS at 09:04

## 2020-04-10 RX ADMIN — DEXTROSE 8 MG/HR: 50 INJECTION, SOLUTION INTRAVENOUS at 02:04

## 2020-04-10 RX ADMIN — Medication 6 MG: at 01:04

## 2020-04-10 RX ADMIN — FOLIC ACID 1 MG: 1 TABLET ORAL at 09:04

## 2020-04-10 RX ADMIN — DEXTROSE 8 MG/HR: 50 INJECTION, SOLUTION INTRAVENOUS at 07:04

## 2020-04-10 RX ADMIN — Medication 100 MG: at 09:04

## 2020-04-10 RX ADMIN — TRAMADOL HYDROCHLORIDE 50 MG: 50 TABLET, FILM COATED ORAL at 08:04

## 2020-04-10 RX ADMIN — SODIUM CHLORIDE: 0.9 INJECTION, SOLUTION INTRAVENOUS at 02:04

## 2020-04-10 NOTE — ASSESSMENT & PLAN NOTE
-likely hypovolemic from UGIB, decreased p.o. intake  -Received 1 unit PRBCs and bolus NS. Will continue IVF at 150 while diet is being advanced  -urine lytes c/w prerenal process.  Urine eosinophils negative  -hold lisinopril 10  -Likely ATN from hypovolemia  -renal ultrasound w/o hydro; consistent with medical renal disease.    -Improving

## 2020-04-10 NOTE — ASSESSMENT & PLAN NOTE
-follows with [facility] at [facility]  -prior imaging not c/w HCC  -see GI bleed  -on low-sodium diet

## 2020-04-10 NOTE — PROGRESS NOTES
Gastroenterology Progress Note    Active Hospital Problems    Tachycardia      *Acute upper GI bleed      High anion gap metabolic acidosis      CKD (chronic kidney disease) stage 3, GFR 30-59 ml/min      Elevated troponin level      Hip arthritis      Essential hypertension      Macrocytic anemia      Alcoholic cirrhosis of liver      ENA (acute kidney injury)      Hypomagnesemia      Transaminitis      Esophageal varices      History of pulmonary embolus (PE)      Thrombocytopenia        Subjective:  Patient seen and examined.  The patient is stable this AM with no overt GI blood loss.  Hb remains at 7. Cr declining.    Reviews of Systems:  General:  Negative for fever or chills  Cardiovascular:  Negative for chest pain, shortness of breath, palpitations    Physical Exam    Vitals:  Vital Signs (Most Recent):  Temp: 98.3 °F (36.8 °C) (04/10/20 0758)  Pulse: 90 (04/10/20 0800)  Resp: 18 (04/10/20 0758)  BP: 132/75 (04/10/20 0758)  SpO2: 99 % (04/10/20 0758) Vital Signs (24h Range):  Temp:  [98.3 °F (36.8 °C)-98.6 °F (37 °C)] 98.3 °F (36.8 °C)  Pulse:  [] 90  Resp:  [16-20] 18  SpO2:  [97 %-100 %] 99 %  BP: (102-149)/(51-83) 132/75     GEN: Well developed, well nourished in no apparent distress   HENT: Normocephalic, anicteric sclera   Cardiovascular: Regular rate and rhythm. No murmurs appreciated.   Chest: Non-labored respirations. Breath sounds equal   Abdomen: soft, NTND, no masses  Psych: Appropriate mood and affect.   Extermities: No C/C/E. 2+ dorsalis pedis pulses bilaterally  Skin: No new visible or palpable lesions.      Medications/Infusions:  Current Facility-Administered Medications   Medication Dose Route Frequency Provider Last Rate Last Dose    0.9%  NaCl infusion (for blood administration)   Intravenous Q24H PRN Cyndee Salinas MD        0.9%  NaCl infusion   Intravenous Continuous Cyndee Salinas  mL/hr at 04/10/20 0956      0.9%  NaCl infusion   Intravenous Once Cyndee Salinas MD         cefTRIAXone (ROCEPHIN) 1 g in dextrose 5 % 50 mL IVPB  1 g Intravenous Q24H Cyndee Salinas MD   1 g at 04/09/20 1600    folic acid tablet 1 mg  1 mg Oral Daily Cyndee Salinas MD   1 mg at 04/10/20 0955    melatonin tablet 6 mg  6 mg Oral Nightly PRN Cyndee Salinas MD   6 mg at 04/10/20 0109    multivitamin tablet  1 tablet Oral Daily Cyndee Salinas MD   1 tablet at 04/10/20 0955    octreotide (SANDOSTATIN) 500 mcg in sodium chloride 0.9% 100 mL infusion  50 mcg/hr Intravenous Continuous Cyndee Salinas MD 10 mL/hr at 04/10/20 0548 50 mcg/hr at 04/10/20 0548    ondansetron injection 4 mg  4 mg Intravenous Q8H PRN Cyndee Salinas MD        pantoprazole 40 mg in dextrose 5 % 100 mL infusion (ready to mix system)  8 mg/hr Intravenous Continuous Cyndee Salinas MD 20 mL/hr at 04/10/20 0759 8 mg/hr at 04/10/20 0759    sodium chloride 0.9% flush 10 mL  10 mL Intravenous PRN Cyndee Salinas MD        sodium chloride 0.9% flush 3 mL  3 mL Intravenous PRN Cyndee Salinas MD        thiamine tablet 100 mg  100 mg Oral Daily Cyndee Salinas MD   100 mg at 04/10/20 0955    traMADoL tablet 50 mg  50 mg Oral Q6H PRN Cyndee Salinas MD   50 mg at 04/09/20 2203       Intake and Output:    Intake/Output Summary (Last 24 hours) at 4/10/2020 1009  Last data filed at 4/10/2020 0600  Gross per 24 hour   Intake 4115.69 ml   Output 4875 ml   Net -759.31 ml       Labs:    Results for MARIA ISABEL LUX (MRN [id number]) as of 4/10/2020 10:09   Ref. Range 4/9/2020 17:50 4/10/2020 05:20   WBC Latest Ref Range: 3.90 - 12.70 K/uL 7.32 6.19   RBC Latest Ref Range: 4.60 - 6.20 M/uL 2.05 (L) 2.01 (L)   Hemoglobin Latest Ref Range: 14.0 - 18.0 g/dL 7.2 (L) 7.0 (L)   Hematocrit Latest Ref Range: 40.0 - 54.0 % 21.9 (L) 21.2 (L)   MCV Latest Ref Range: 82 - 98 fL 107 (H) 106 (H)   MCH Latest Ref Range: 27.0 - 31.0 pg 35.1 (H) 34.8 (H)   MCHC Latest Ref Range: 32.0 - 36.0 g/dL 32.9 33.0   RDW Latest Ref Range: 11.5 - 14.5 % 18.0 (H) 17.9 (H)    Platelets Latest Ref Range: 150 - 350 K/uL 56 (L) 53 (L)   Results for MARIA ISABEL LUX (MRN [id number]) as of 4/10/2020 10:09   Ref. Range 4/10/2020 05:20   Magnesium Latest Ref Range: 1.6 - 2.6 mg/dL 1.7   Alkaline Phosphatase Latest Ref Range: 55 - 135 U/L 79   PROTEIN TOTAL Latest Ref Range: 6.0 - 8.4 g/dL 6.9   Albumin Latest Ref Range: 3.5 - 5.2 g/dL 2.7 (L)   BILIRUBIN TOTAL Latest Ref Range: 0.1 - 1.0 mg/dL 3.6 (H)   AST Latest Ref Range: 10 - 40 U/L 118 (H)   ALT Latest Ref Range: 10 - 44 U/L 39       Imaging and other studies:  Results for MARIA ISABEL LUX (MRN [id number]) as of 4/10/2020 10:09   Ref. Range 4/10/2020 05:20   Magnesium Latest Ref Range: 1.6 - 2.6 mg/dL 1.7   Alkaline Phosphatase Latest Ref Range: 55 - 135 U/L 79   PROTEIN TOTAL Latest Ref Range: 6.0 - 8.4 g/dL 6.9   Albumin Latest Ref Range: 3.5 - 5.2 g/dL 2.7 (L)   BILIRUBIN TOTAL Latest Ref Range: 0.1 - 1.0 mg/dL 3.6 (H)   AST Latest Ref Range: 10 - 40 U/L 118 (H)   ALT Latest Ref Range: 10 - 44 U/L 39       Assessment:  45 yo with known history of ETOH cirrhosis, esophageal banding x 2 (last 2018) who presents with large volume hematemesis.  EGD 4/8 with actively bleeding esophageal varices s/p banding x 4 and portal hypertensive gastropathy.  Stable overnight, Cr improving.     Plan:    1.  Low Na diet  2.  Continue with Octreotide infusion until tomorrow.  3.  Change Pantoprazole to BID  4.  Continue with Rocephin for SBP prophylaxis  5.  Will follow.    Likely discharge tomorrow.

## 2020-04-10 NOTE — SUBJECTIVE & OBJECTIVE
Interval History:  He feels well.  Appetite has improved.  No nausea, vomiting.  Had 1 BM without hematochezia, melena.  No abdominal pain.  No lightheadedness, dizziness.  Has insomnia, but has been sleeping during the day.  Palpitations resolved.    Review of Systems   Constitutional: Negative for activity change, appetite change and fever.   HENT: Negative for rhinorrhea and sneezing.    Eyes: Negative for discharge and redness.   Respiratory: Negative for cough and shortness of breath.    Cardiovascular: Negative for chest pain, palpitations and leg swelling.   Gastrointestinal: Negative for abdominal pain, blood in stool, constipation, diarrhea, nausea and vomiting.   Genitourinary: Negative for difficulty urinating and dysuria.   Musculoskeletal: Positive for arthralgias. Negative for gait problem.   Skin: Negative for rash and wound.   Neurological: Negative for dizziness and light-headedness.   Hematological: Negative for adenopathy.   Psychiatric/Behavioral: Positive for sleep disturbance. Negative for confusion. The patient is not nervous/anxious.      Objective:     Vital Signs (Most Recent):  Temp: 98.3 °F (36.8 °C) (04/10/20 0758)  Pulse: 98 (04/10/20 1000)  Resp: 18 (04/10/20 0758)  BP: 132/75 (04/10/20 0758)  SpO2: 99 % (04/10/20 0758) Vital Signs (24h Range):  Temp:  [98.3 °F (36.8 °C)-98.6 °F (37 °C)] 98.3 °F (36.8 °C)  Pulse:  [] 98  Resp:  [16-20] 18  SpO2:  [97 %-100 %] 99 %  BP: (102-149)/(51-83) 132/75     Weight: 86.3 kg (190 lb 4.1 oz)  Body mass index is 25.8 kg/m².    Intake/Output Summary (Last 24 hours) at 4/10/2020 1043  Last data filed at 4/10/2020 0600  Gross per 24 hour   Intake 4115.69 ml   Output 4875 ml   Net -759.31 ml      Physical Exam   Constitutional: He appears well-developed and well-nourished. No distress.   HENT:   Head: Normocephalic and atraumatic.   Mouth/Throat: Oropharynx is clear and moist.   Eyes: Conjunctivae and EOM are normal. Right eye exhibits no  discharge. Left eye exhibits no discharge. Scleral icterus is present.   Neck: No tracheal deviation present.   Cardiovascular: Normal rate, regular rhythm and normal heart sounds.   No murmur heard.  Pulmonary/Chest: Effort normal and breath sounds normal. No stridor. No respiratory distress. He has no wheezes.   Abdominal: Soft. Bowel sounds are normal. He exhibits no distension. There is no tenderness.   Musculoskeletal: He exhibits no edema or tenderness.   Neurological: He is alert. No cranial nerve deficit.   Skin: Skin is warm. No rash noted. He is not diaphoretic.   Psychiatric: He has a normal mood and affect. His behavior is normal.       Significant Labs:   CBC:   Recent Labs   Lab 04/09/20  0955 04/09/20  1750 04/10/20  0520   WBC 9.06 7.32 6.19   HGB 7.0* 7.2* 7.0*   HCT 21.2* 21.9* 21.2*   PLT 57* 56* 53*     CMP:   Recent Labs   Lab 04/08/20  1215 04/09/20  0517 04/10/20  0520    134* 137   K 3.8 4.1 3.5   CL 97 102 105   CO2 12* 18* 24   GLU 57* 132* 126*   BUN 13 21* 13   CREATININE 1.7* 2.7* 1.7*   CALCIUM 9.3 7.7* 7.9*   PROT 8.8* 6.9 6.9   ALBUMIN 3.3* 2.7* 2.7*   BILITOT 4.9* 4.7* 3.6*   ALKPHOS 100 74 79   * 99* 118*   ALT 48* 37 39   ANIONGAP 27* 14 8   EGFRNONAA 48* 27* 48*       Significant Imaging: U/S: I have reviewed all pertinent results/findings within the past 24 hours and my personal findings are:  Renal ultrasound without hydro; consistent with medical renal disease

## 2020-04-10 NOTE — ASSESSMENT & PLAN NOTE
-mildly elevated, 2:1 ratio.  Stable  -Likely 2/2 ETOH and cirrhosis  -hepatitis panel NR   -monitor

## 2020-04-10 NOTE — ASSESSMENT & PLAN NOTE
-possibly from ENA, lactic acidosis.  Lactic acid 8.6 and improved with 1 unit PRBCs, IV fluids.  -resolved with fluid resuscitation  -ETOH, tox neg

## 2020-04-10 NOTE — H&P
For H&P please see Dr. Salinas's progress note of 4/8/2020.  It was mislabeled as a progress note.

## 2020-04-10 NOTE — ASSESSMENT & PLAN NOTE
-with hypotension 89/44, tachycardic to 128 on admission.  Hypotension and tachycardia now resolved.    -Hgb baseline 9-10.  Received 1 unit PRBCs due to active bleeding on EGD.  HGB has remained stable at 7  -GI following.  EGD with varices, oozing.  S/p banding x 4  -will need repeat EGD in 4 weeks  -Cont octreotide gtt.  Protonix gtt switched to IV b.i.d.  -continue Rocephin for SBP prophylaxis for 7 days.  No ascites.  -CBC BID, vitals q.4 hours  -advance diet as tolerated

## 2020-04-10 NOTE — PLAN OF CARE
Patient remained free from injury and fall throughout night. Patient's vitals remained stable.  Pt on room air.   Pt on clear liquid diet.  Pt requested tramadol for pain of 3. Tramadol administered as ordered. Pt also requested melatonin for sleep. Administered as ordered. Bed locked in lowest position with side rails up x 2. Call bell within reach of patient. Purposeful rounding maintained throughout shift.

## 2020-04-10 NOTE — PROGRESS NOTES
[facility] Hospital Medicine  Progress Note    Patient Name: Irvin Diaz  MRN: [id number]  Patient Class: IP- Inpatient   Admission Date: 4/8/2020  Length of Stay: 2 days  Attending Physician: Ayah Whitney MD  Primary Care Provider: [facility]        Subjective:     Principal Problem:Acute upper GI bleed        HPI:  Irvin Diaz is a 44 y.o. male with PMHx alcoholic cirrhosis, varices (s/p banding x2 ), gastritis, microcytic anemia, thrombocytopenia, PE, CKD 3 who presented to the ED d/t hematemesis.    USOH:  He lives with his significant other.  On disability/SSI.  PCP at [facility].    Yesterday, he felt fatigued with exertion.  He did not eat yesterday or today due to decreased appetite.  Took Aleeve x 2 yesterday d/t headache.  He also reported palpitations late last night.  Denied dizziness, lightheadedness, SOB, CP.  He also did not eat anything this morning due to decreased appetite and started to have episodes of hematemesis around noon.  It initially appeared like coffee ground emesis which progressed to bright red hematemesis right before and in the ED.    He has mild diffuse abd pain.  No hematochezia, melena or diarrhea.     He has a history of ETOH cirrhosis.  Prior imaging was not concerning for HCC.  He underwent EGD  due to coffee-ground emesis which revealed grade II varices, grade B reflux esophagitis, chronic gastritis.  S/p banding x2 in 3-2018.  Follows with Avtar LÓPEZ at [facility].  He was on propranolol, but this was switched to Coreg by nephrology.  He has a history of ETOH abuse and used to drink a pint of gin daily.  Last drink reportedly 1 month ago.  H/o withdrawals when he initially quit, but no hospitalizations for this.  No h/o seizures. No illicit drug use.  H/o C scope at [facility]  with diverticulosis.    He has a history of unprovoked PE, DVT .  He was on Coumadin, but this was discontinued  due to GI bleed.  He underwent hemophilia workup with [facility].    He has chronic hip pain.  Was unable to perform hip replacement due to cirrhosis.  Used to be on tramadol.    He has a history of ATN thought to be from volume depletion from colitis and ETOH use.  He follows with Nephrology, Dr. Samson.    Overview/Hospital Course:  ED reported 200-300 cc of hematemesis in the ED.  He was tachycardic up to 128 with hypotension to 89/44.  H/H 9.5/29 with baseline HGB 9-10.  INR 1.3.  Troponin was elevated at 0.043 and remained stable, likely demand ischemia.  EKGs with sinus tach, but no ischemic changes.  He denied CP.    He was admitted to the ICU and underwent emergent EGD that revealed 2 columns of esophageal varices, 1 with active bleeding.  There was active oozing in the cardia, fundus and gastric body.  S/p banding x4.  He was started on octreotide, Protonix gtts and Rocephin for SBP prophylaxis.  There was no ascites.  He was transfused 1 unit PRBCs due to bleeding seen on EGD.  Post transfusion Hgb remained stable at 7.  Plan to repeat EGD in 4 weeks for banding.    Labs also revealed ENA on CKD 3 with creatinine elevated to 1.7, 2.7, likely ATN from hypovolemia.  Urine lytes c/w prerenal process.  Given NSAID use, urine eosinophils was negative.  Renal U/S without hydro; consistent with medical renal disease.  Baseline creatinine 1.0-1.3.  He was transfused as above, given IVF and Lisinopril 10 was held.  Creatinine improved to 1.7.    Interval History:  He feels well.  Appetite has improved.  No nausea, vomiting.  Had 1 BM without hematochezia, melena.  No abdominal pain.  No lightheadedness, dizziness.  Has insomnia, but has been sleeping during the day.  Palpitations resolved.    Review of Systems   Constitutional: Negative for activity change, appetite change and fever.   HENT: Negative for rhinorrhea and sneezing.    Eyes: Negative for discharge and redness.   Respiratory: Negative for cough and  shortness of breath.    Cardiovascular: Negative for chest pain, palpitations and leg swelling.   Gastrointestinal: Negative for abdominal pain, blood in stool, constipation, diarrhea, nausea and vomiting.   Genitourinary: Negative for difficulty urinating and dysuria.   Musculoskeletal: Positive for arthralgias. Negative for gait problem.   Skin: Negative for rash and wound.   Neurological: Negative for dizziness and light-headedness.   Hematological: Negative for adenopathy.   Psychiatric/Behavioral: Positive for sleep disturbance. Negative for confusion. The patient is not nervous/anxious.      Objective:     Vital Signs (Most Recent):  Temp: 98.3 °F (36.8 °C) (04/10/20 0758)  Pulse: 98 (04/10/20 1000)  Resp: 18 (04/10/20 0758)  BP: 132/75 (04/10/20 0758)  SpO2: 99 % (04/10/20 0758) Vital Signs (24h Range):  Temp:  [98.3 °F (36.8 °C)-98.6 °F (37 °C)] 98.3 °F (36.8 °C)  Pulse:  [] 98  Resp:  [16-20] 18  SpO2:  [97 %-100 %] 99 %  BP: (102-149)/(51-83) 132/75     Weight: 86.3 kg (190 lb 4.1 oz)  Body mass index is 25.8 kg/m².    Intake/Output Summary (Last 24 hours) at 4/10/2020 1043  Last data filed at 4/10/2020 0600  Gross per 24 hour   Intake 4115.69 ml   Output 4875 ml   Net -759.31 ml      Physical Exam   Constitutional: He appears well-developed and well-nourished. No distress.   HENT:   Head: Normocephalic and atraumatic.   Mouth/Throat: Oropharynx is clear and moist.   Eyes: Conjunctivae and EOM are normal. Right eye exhibits no discharge. Left eye exhibits no discharge. Scleral icterus is present.   Neck: No tracheal deviation present.   Cardiovascular: Normal rate, regular rhythm and normal heart sounds.   No murmur heard.  Pulmonary/Chest: Effort normal and breath sounds normal. No stridor. No respiratory distress. He has no wheezes.   Abdominal: Soft. Bowel sounds are normal. He exhibits no distension. There is no tenderness.   Musculoskeletal: He exhibits no edema or tenderness.   Neurological: He  is alert. No cranial nerve deficit.   Skin: Skin is warm. No rash noted. He is not diaphoretic.   Psychiatric: He has a normal mood and affect. His behavior is normal.       Significant Labs:   CBC:   Recent Labs   Lab 04/09/20  0955 04/09/20  1750 04/10/20  0520   WBC 9.06 7.32 6.19   HGB 7.0* 7.2* 7.0*   HCT 21.2* 21.9* 21.2*   PLT 57* 56* 53*     CMP:   Recent Labs   Lab 04/08/20  1215 04/09/20  0517 04/10/20  0520    134* 137   K 3.8 4.1 3.5   CL 97 102 105   CO2 12* 18* 24   GLU 57* 132* 126*   BUN 13 21* 13   CREATININE 1.7* 2.7* 1.7*   CALCIUM 9.3 7.7* 7.9*   PROT 8.8* 6.9 6.9   ALBUMIN 3.3* 2.7* 2.7*   BILITOT 4.9* 4.7* 3.6*   ALKPHOS 100 74 79   * 99* 118*   ALT 48* 37 39   ANIONGAP 27* 14 8   EGFRNONAA 48* 27* 48*       Significant Imaging: U/S: I have reviewed all pertinent results/findings within the past 24 hours and my personal findings are:  Renal ultrasound without hydro; consistent with medical renal disease      Assessment/Plan:      * Acute upper GI bleed  -with hypotension 89/44, tachycardic to 128 on admission.  Hypotension and tachycardia now resolved.    -Hgb baseline 9-10.  Received 1 unit PRBCs due to active bleeding on EGD.  HGB has remained stable at 7  -GI following.  EGD with varices, oozing.  S/p banding x 4  -will need repeat EGD in 4 weeks  -Cont octreotide gtt.  Protonix gtt switched to IV b.i.d.  -continue Rocephin for SBP prophylaxis for 7 days.  No ascites.  -CBC BID, vitals q.4 hours  -advance diet as tolerated    Hip arthritis  -unable to perform outpatient hip replacement due to cirrhosis  -avoid NSAIDs due to GI bleed  -tramadol p.r.n.      Elevated troponin level  -likely demand ischemia from hypovolemia, GI bleed  -no CP  -Troponins have remained stable. EKG with sinus tach, but no ischemic changes    CKD (chronic kidney disease) stage 3, GFR 30-59 ml/min  -see ENA  -follows with Nephrology, Dr. Samson    High anion gap metabolic acidosis  -possibly from ENA,  lactic acidosis.  Lactic acid 8.6 and improved with 1 unit PRBCs, IV fluids.  -resolved with fluid resuscitation  -ETOH, tox neg      Macrocytic anemia  -iron panel  with ferritin 160, TIBC low.  B12, folate WNL  -see GI bleed      Alcoholic cirrhosis of liver  -follows with [facility] at [facility]  -prior imaging not c/w HCC  -see GI bleed  -on low-sodium diet      Essential hypertension  -initially with hypotension, hypovolemia from GI bleed  -hold Coreg 12.5 b.i.d., Norvasc 10  -hold lisinopril 10 due to ENA  -BP currently controlled off antihypertensives  -will monitor      ENA (acute kidney injury)  -likely hypovolemic from UGIB, decreased p.o. intake  -Received 1 unit PRBCs and bolus NS. Will continue IVF at 150 while diet is being advanced  -urine lytes c/w prerenal process.  Urine eosinophils negative  -hold lisinopril 10  -Likely ATN from hypovolemia  -renal ultrasound w/o hydro; consistent with medical renal disease.    -Improving    Hypomagnesemia  -monitor and replete p.r.n.      Transaminitis  -mildly elevated, 2:1 ratio.  Stable  -Likely 2/2 ETOH and cirrhosis  -hepatitis panel NR   -monitor    Esophageal varices  -h/o banding x2 in   -Repeat EGD with varices, s/p banding x4  -propranolol was switched to Coreg; hold given hypovolemia/hypotension      History of pulmonary embolus (PE)  -unprovoked  -underwent outpatient hemophilia workup with Hematology at [facility]  -Coumadin was discontinued due to GI bleed  -IAIN hose, SCDs d/t GIB      Thrombocytopenia  -2/2 cirrhosis  -has UGIB, but no need for platelet transfusion  -continue to monitor        VTE Risk Mitigation (From admission, onward)         Ordered     Place IAIN hose  Until discontinued      04/08/20 1617     IP VTE HIGH RISK PATIENT  Once      04/08/20 1617     Place sequential compression device  Until discontinued      04/08/20 1617     Reason for no Mechanical VTE Prophylaxis  Once     Question:  Reasons:  Answer:  Risk of  Bleeding    04/08/20 1619                      Cyndee Salinas MD  Department of Hospital Medicine   [facility]

## 2020-04-10 NOTE — ASSESSMENT & PLAN NOTE
-unprovoked  -underwent outpatient hemophilia workup with Hematology at [facility]  -Coumadin was discontinued due to GI bleed  -IAIN hose, SCDs d/t GIB

## 2020-04-11 VITALS
HEART RATE: 89 BPM | WEIGHT: 190.25 LBS | TEMPERATURE: 99 F | RESPIRATION RATE: 20 BRPM | DIASTOLIC BLOOD PRESSURE: 78 MMHG | HEIGHT: 72 IN | OXYGEN SATURATION: 99 % | SYSTOLIC BLOOD PRESSURE: 146 MMHG | BODY MASS INDEX: 25.77 KG/M2

## 2020-04-11 PROBLEM — I85.11 SECONDARY ESOPHAGEAL VARICES WITH BLEEDING: Status: ACTIVE | Noted: 2019-05-11

## 2020-04-11 LAB
ALBUMIN SERPL BCP-MCNC: 2.7 G/DL (ref 3.5–5.2)
ALP SERPL-CCNC: 75 U/L (ref 55–135)
ALT SERPL W/O P-5'-P-CCNC: 45 U/L (ref 10–44)
ANION GAP SERPL CALC-SCNC: 9 MMOL/L (ref 8–16)
ANISOCYTOSIS BLD QL SMEAR: SLIGHT
AST SERPL-CCNC: 115 U/L (ref 10–40)
BASOPHILS # BLD AUTO: 0.02 K/UL (ref 0–0.2)
BASOPHILS NFR BLD: 0.3 % (ref 0–1.9)
BILIRUB SERPL-MCNC: 3.4 MG/DL (ref 0.1–1)
BUN SERPL-MCNC: 8 MG/DL (ref 6–20)
BURR CELLS BLD QL SMEAR: ABNORMAL
CALCIUM SERPL-MCNC: 8.1 MG/DL (ref 8.7–10.5)
CHLORIDE SERPL-SCNC: 104 MMOL/L (ref 95–110)
CO2 SERPL-SCNC: 24 MMOL/L (ref 23–29)
CREAT SERPL-MCNC: 1.4 MG/DL (ref 0.5–1.4)
DIFFERENTIAL METHOD: ABNORMAL
EOSINOPHIL # BLD AUTO: 0.1 K/UL (ref 0–0.5)
EOSINOPHIL NFR BLD: 1.3 % (ref 0–8)
ERYTHROCYTE [DISTWIDTH] IN BLOOD BY AUTOMATED COUNT: 17.3 % (ref 11.5–14.5)
EST. GFR  (AFRICAN AMERICAN): >60 ML/MIN/1.73 M^2
EST. GFR  (NON AFRICAN AMERICAN): >60 ML/MIN/1.73 M^2
GLUCOSE SERPL-MCNC: 108 MG/DL (ref 70–110)
HCT VFR BLD AUTO: 21.3 % (ref 40–54)
HGB BLD-MCNC: 7.1 G/DL (ref 14–18)
HYPOCHROMIA BLD QL SMEAR: ABNORMAL
IMM GRANULOCYTES # BLD AUTO: 0.02 K/UL (ref 0–0.04)
IMM GRANULOCYTES NFR BLD AUTO: 0.3 % (ref 0–0.5)
LYMPHOCYTES # BLD AUTO: 1.5 K/UL (ref 1–4.8)
LYMPHOCYTES NFR BLD: 24.7 % (ref 18–48)
MAGNESIUM SERPL-MCNC: 1.5 MG/DL (ref 1.6–2.6)
MCH RBC QN AUTO: 35 PG (ref 27–31)
MCHC RBC AUTO-ENTMCNC: 33.3 G/DL (ref 32–36)
MCV RBC AUTO: 105 FL (ref 82–98)
MONOCYTES # BLD AUTO: 0.7 K/UL (ref 0.3–1)
MONOCYTES NFR BLD: 11.1 % (ref 4–15)
NEUTROPHILS # BLD AUTO: 3.9 K/UL (ref 1.8–7.7)
NEUTROPHILS NFR BLD: 62.3 % (ref 38–73)
NRBC BLD-RTO: 0 /100 WBC
OVALOCYTES BLD QL SMEAR: ABNORMAL
PLATELET # BLD AUTO: 58 K/UL (ref 150–350)
PLATELET BLD QL SMEAR: ABNORMAL
PMV BLD AUTO: 11.2 FL (ref 9.2–12.9)
POLYCHROMASIA BLD QL SMEAR: ABNORMAL
POTASSIUM SERPL-SCNC: 3.4 MMOL/L (ref 3.5–5.1)
PROT SERPL-MCNC: 6.8 G/DL (ref 6–8.4)
RBC # BLD AUTO: 2.03 M/UL (ref 4.6–6.2)
SODIUM SERPL-SCNC: 137 MMOL/L (ref 136–145)
TARGETS BLD QL SMEAR: ABNORMAL
WBC # BLD AUTO: 6.23 K/UL (ref 3.9–12.7)

## 2020-04-11 PROCEDURE — 83735 ASSAY OF MAGNESIUM: CPT

## 2020-04-11 PROCEDURE — 99238 PR HOSPITAL DISCHARGE DAY,<30 MIN: ICD-10-PCS | Mod: ,,, | Performed by: HOSPITALIST

## 2020-04-11 PROCEDURE — 80053 COMPREHEN METABOLIC PANEL: CPT

## 2020-04-11 PROCEDURE — 99238 HOSP IP/OBS DSCHRG MGMT 30/<: CPT | Mod: ,,, | Performed by: HOSPITALIST

## 2020-04-11 PROCEDURE — 63600175 PHARM REV CODE 636 W HCPCS: Performed by: INTERNAL MEDICINE

## 2020-04-11 PROCEDURE — C9113 INJ PANTOPRAZOLE SODIUM, VIA: HCPCS | Performed by: INTERNAL MEDICINE

## 2020-04-11 PROCEDURE — 25000003 PHARM REV CODE 250: Performed by: INTERNAL MEDICINE

## 2020-04-11 RX ORDER — AMLODIPINE BESYLATE 5 MG/1
10 TABLET ORAL DAILY
Status: ON HOLD
Start: 2020-04-11 | End: 2020-11-12 | Stop reason: HOSPADM

## 2020-04-11 RX ORDER — POTASSIUM CHLORIDE 750 MG/1
10 TABLET, EXTENDED RELEASE ORAL DAILY
Status: ON HOLD | COMMUNITY
End: 2020-09-08 | Stop reason: CLARIF

## 2020-04-11 RX ORDER — PANTOPRAZOLE SODIUM 40 MG/1
40 TABLET, DELAYED RELEASE ORAL 2 TIMES DAILY
Qty: 180 TABLET | Refills: 0 | Status: SHIPPED | OUTPATIENT
Start: 2020-04-11 | End: 2020-10-18 | Stop reason: CLARIF

## 2020-04-11 RX ADMIN — PANTOPRAZOLE SODIUM 40 MG: 40 INJECTION, POWDER, LYOPHILIZED, FOR SOLUTION INTRAVENOUS at 09:04

## 2020-04-11 RX ADMIN — SODIUM CHLORIDE: 0.9 INJECTION, SOLUTION INTRAVENOUS at 01:04

## 2020-04-11 RX ADMIN — FOLIC ACID 1 MG: 1 TABLET ORAL at 09:04

## 2020-04-11 RX ADMIN — THERA TABS 1 TABLET: TAB at 09:04

## 2020-04-11 RX ADMIN — Medication 100 MG: at 09:04

## 2020-04-11 NOTE — PLAN OF CARE
Patient remained free from injury and fall throughout the night. Patient requested medication for pain and sleep. Medications were administered per order.  Patient's vitals remained stable throughout the night. Labs drawn at 0400. Bed locked in lowest position with side rails up x 2. Call bell within reach of patient. Purposeful rounding maintained throughout the shift.

## 2020-04-11 NOTE — DISCHARGE INSTRUCTIONS
Step-by-Step  Checking Your Blood Pressure    Date Last Reviewed: 4/27/2016  © 8434-1134 [employer]. [address] [place] [address], [place] [address]. All rights reserved. This information is not intended as a substitute for professional medical care. Always follow your healthcare professional's instructions.      Upper GI Bleeding (Stable)  Your upper gastrointestinal (GI) tract includes your esophagus, stomach, and upper small intestine. You have signs of bleeding from your upper GI tract. You may have vomited or coughed up blood or coffee-ground like material. Or you may have black or tarry stools. Very small amounts of GI bleeding may not be visible and can only be found by a test of the stool.  Causes of upper GI bleeding can include:  · Tear in the lining of the esophagus  · Enlarged veins in the esophagus  · An ulcer in the stomach or top of the small intestine  · Severe irritation of the stomach  · Inflammation of the digestive tract  Note: A bloody nose or mouth or dental problems may cause blood to be swallowed and vomited up again. This is not true GI bleeding. Iron supplements and medicines for diarrhea and upset stomach can cause black stools. This is not GI bleeding and is not a cause for concern.  Home care  Depending on the cause of your bleeding, care may include the following:  · You may be given medicines to help protect your GI tract, treat your problem, and promote healing. Take these as directed.  · Avoid NSAIDs, such as aspirin, ibuprofen, or naproxen. They can irritate the stomach and cause further bleeding. If you are taking these medicines for other medical reasons, talk to your healthcare provider before you stop them.    · Avoid alcohol, caffeine, and tobacco, which can delay healing and worsen your problem.  Follow-up care  Follow up with your healthcare provider as advised. Further tests may need to be done to find the cause of your bleeding.  When to seek medical advice  Call  your healthcare provider for any of the following:  · Stomach pain appears or gets worse  · Pain spreads to the neck, back, shoulder, or arm  · Weakness or dizziness  · Swelling of your abdomen  · Red blood in your stool  · Fever of 100.4F (38C) or higher, or as directed by your healthcare provider  Call 911  Get emergency medical care if any of these occur:  · Trouble breathing or swallowing  · Severe dizziness  · Loss of consciousness  · Vomiting blood or large amounts of blood in the stool  Date Last Reviewed: 6/24/2015 © 2000-2017 [employer]. [address] [place] [address]. All rights reserved. This information is not intended as a substitute for professional medical care. Always follow your healthcare professional's instructions.

## 2020-04-11 NOTE — DISCHARGE SUMMARY
[facility]  Hospital Medicine  Discharge Summary      Patient Name: Irvin Diaz  MRN: [id number]  Admission Date: 4/8/2020  Hospital Length of Stay: 3 days  Discharge Date and Time:  04/11/2020 8:19 AM  Attending Physician: Ayah Whitney MD   Discharging Provider: Ayah Whitney MD  Primary Care Provider: [facility]      HPI:   From H&P by Dr. Salinas:  Mr. Diaz is a 44 y.o. male with PMHx alcoholic cirrhosis, varices (s/p banding x2 ), gastritis, microcytic anemia, thrombocytopenia, PE, CKD 3 who presented to the ED d/t hematemesis.    USOH:  He lives with his significant other.  On disability/SSI.  PCP (Dr. Snyder) at [facility].    Yesterday, he felt fatigued with exertion.  He did not eat yesterday or today due to decreased appetite.  Took Aleve x 2 yesterday d/t headache.  He also reported palpitations late last night.  Denied dizziness, lightheadedness, SOB, CP.  He also did not eat anything this morning due to decreased appetite and started to have episodes of hematemesis around noon.  It initially appeared like coffee ground emesis which progressed to bright red hematemesis right before and in the ED.    He has mild diffuse abd pain.  No hematochezia, melena or diarrhea.     He has a history of ETOH cirrhosis.  Prior imaging was not concerning for HCC.  He underwent EGD  due to coffee-ground emesis which revealed grade II varices, grade B reflux esophagitis, chronic gastritis.  S/p banding x2 in 3-2018.  Follows with [facility] at [facility].  He was on propranolol, but this was switched to Coreg by nephrology.  He has a history of ETOH abuse and used to drink a pint of gin daily.  Last drink reportedly 1 month ago.  H/o withdrawals when he initially quit, but no hospitalizations for this.  No h/o seizures. No illicit drug use.  H/o C scope at [facility]  with diverticulosis.    He has a history of unprovoked PE, DVT .   He was on Coumadin, but this was discontinued due to GI bleed.  He underwent hemophilia workup with [facility].    He has chronic hip pain.  Was unable to perform hip replacement due to cirrhosis.  Used to be on tramadol.    He has a history of ATN thought to be from volume depletion from colitis and ETOH use.  He follows with Nephrology, Dr. Samson.    Procedure(s) (LRB):  EGD (ESOPHAGOGASTRODUODENOSCOPY) (N/A)      Hospital Course:   ED reported 200-300 cc of hematemesis in the ED.  He was tachycardic up to 128 with hypotension to 89/44.  H/H 9.5/29 with baseline HGB 9-10.  INR 1.3.  Troponin was elevated at 0.043 and remained stable, likely demand ischemia.  EKGs with sinus tach, but no ischemic changes.  He denied CP.    He was admitted to the ICU and underwent emergent EGD that revealed 2 columns of esophageal varices, 1 with active bleeding.  There was active oozing in the cardia, fundus and gastric body.  S/p banding x4.  He was started on octreotide, Protonix gtts and Rocephin for SBP prophylaxis.  There was no ascites.  He was transfused 1 unit PRBCs due to bleeding seen on EGD.  Post transfusion Hgb remained stable at 7.  Plan to repeat EGD in 4 weeks for banding.    Labs also revealed ENA on CKD 3 with creatinine elevated to 1.7, 2.7, likely ATN from hypovolemia.  Urine lytes c/w prerenal process.  Given NSAID use, urine eosinophils was negative.  Renal U/S without hydro; consistent with medical renal disease.  Baseline creatinine 1.0-1.3.  He was transfused as above, given IVF and Lisinopril 10 was held.  Creatinine improved to 1.7 and was down to 1.4 on discharge.  Patient was instructed to call his nephrologist's office on Monday with blood pressure readings to guide decision whether or not to resume it.     Consults:   Consults (From admission, onward)        Status Ordering Provider     Inpatient consult to PICC team (MOHAMUD)  Once     Provider:  (Not yet assigned)    HARDY Oquendo             Final Active Diagnoses:    Diagnosis Date Noted POA    PRINCIPAL PROBLEM:  Secondary esophageal varices with bleeding [I85.11] 05/11/2019 Yes    Acute blood loss anemia [D62] 05/11/2019 Yes    ENA (acute kidney injury) [N17.9] 01/09/2020 Yes    Alcoholic cirrhosis of liver [K70.30]  Yes    High anion gap metabolic acidosis [E87.2] 04/08/2020 Yes    CKD (chronic kidney disease) stage 3, GFR 30-59 ml/min [N18.3] 04/08/2020 Yes    Elevated troponin level [R79.89] 04/08/2020 Yes    Essential hypertension [I10]  Yes    Macrocytic anemia [D53.9]  Yes    Hypomagnesemia [E83.42] 07/26/2019 Yes    Transaminitis [R74.0] 07/25/2019 Yes    History of pulmonary embolus (PE) [Z86.711] 05/11/2019 Yes    Thrombocytopenia [D69.6] 05/11/2019 Yes      Problems Resolved During this Admission:    Diagnosis Date Noted Date Resolved POA    Hypovolemic shock [R57.1] 04/08/2020 04/08/2020 Yes    Tachycardia [R00.0] 04/09/2020 04/10/2020 Yes       Discharged Condition: stable    Disposition: Home or Self Care    Follow Up:  Follow-up Information     [facility].    Specialties:  Internal Medicine, Family Medicine  Why:  Follow up with your PCP at [facility].  Contact information:  [address]  [place] [address]  [phone]             Precious Samson MD.    Specialty:  Nephrology  Why:  Call Monday with blood pressure reading.  Office appointment is 6/2/2020  Contact information:  [address]  [place] [address]  [phone]             Reggie Wilcox DO.    Specialty:  Internal Medicine  Why:  Call to make appointment in 4 weeks for repeat EGD  Contact information:  [id number]  [place] [address] [phone]                 Patient Instructions:      Diet Adult Regular     Order Specific Question Answer Comments   Na restriction, if any: 2gNa      Activity as tolerated         Medications:  Reconciled Home Medications:       Medication List      CHANGE how you take these medications    pantoprazole 40 MG tablet  Commonly known as:  PROTONIX  Take 1 tablet (40 mg total) by mouth 2 (two) times daily.  What changed:  when to take this     potassium chloride SA 10 MEQ tablet  Commonly known as:  K-DUR,KLOR-CON  Take 10 mEq by mouth once daily.  What changed:  Another medication with the same name was removed. Continue taking this medication, and follow the directions you see here.        CONTINUE taking these medications    amLODIPine 5 MG tablet  Commonly known as:  NORVASC  Take 2 tablets (10 mg total) by mouth once daily.     carvediloL 6.25 MG tablet  Commonly known as:  COREG  Take 12.5 mg by mouth 2 (two) times daily with meals.     ondansetron 4 MG Tbdl  Commonly known as:  ZOFRAN-ODT  Take 1 tablet (4 mg total) by mouth every 6 (six) hours as needed.        STOP taking these medications    lisinopriL 10 MG tablet            Time spent on the discharge of patient: <30 minutes  Patient was seen and examined on the date of discharge and determined to be suitable for discharge.         Ayah Rae MD  Department of Hospital Medicine  [facility]

## 2020-04-11 NOTE — PLAN OF CARE
In agreement and eager for DC.  VU of DC instructions, paperwork passed and prescriptions called. IV & Midline removed with cath tip intact, WNL.    Tele removed, to be returned to [facility]  To be DC home with family.  Will be escorted downstairs via  transport team once dressed and ready.   Free from falls or skin breakdown this hospital admission.

## 2020-09-03 ENCOUNTER — HOSPITAL ENCOUNTER (EMERGENCY)
Facility: OTHER | Age: 45
Discharge: HOME | End: 2020-09-03
Attending: EMERGENCY MEDICINE
Payer: MEDICAID

## 2020-09-03 VITALS
SYSTOLIC BLOOD PRESSURE: 149 MMHG | BODY MASS INDEX: 25.6 KG/M2 | DIASTOLIC BLOOD PRESSURE: 83 MMHG | HEIGHT: 72 IN | HEART RATE: 60 BPM | RESPIRATION RATE: 19 BRPM | WEIGHT: 189 LBS | OXYGEN SATURATION: 100 % | TEMPERATURE: 98 F

## 2020-09-03 DIAGNOSIS — K44.9 HIATAL HERNIA: ICD-10-CM

## 2020-09-03 DIAGNOSIS — K52.9 COLITIS: ICD-10-CM

## 2020-09-03 DIAGNOSIS — N28.9 RENAL INSUFFICIENCY: ICD-10-CM

## 2020-09-03 DIAGNOSIS — R10.32 LLQ ABDOMINAL PAIN: Primary | ICD-10-CM

## 2020-09-03 DIAGNOSIS — K74.60 CIRRHOSIS OF LIVER WITHOUT ASCITES, UNSPECIFIED HEPATIC CIRRHOSIS TYPE: ICD-10-CM

## 2020-09-03 LAB
ALBUMIN SERPL BCP-MCNC: 3.7 G/DL (ref 3.5–5.2)
ALP SERPL-CCNC: 112 U/L (ref 55–135)
ALT SERPL W/O P-5'-P-CCNC: 46 U/L (ref 10–44)
ANION GAP SERPL CALC-SCNC: 15 MMOL/L (ref 8–16)
AST SERPL-CCNC: 93 U/L (ref 10–40)
BACTERIA #/AREA URNS HPF: ABNORMAL /HPF
BASOPHILS # BLD AUTO: 0.03 K/UL (ref 0–0.2)
BASOPHILS NFR BLD: 0.4 % (ref 0–1.9)
BILIRUB SERPL-MCNC: 4.6 MG/DL (ref 0.1–1)
BILIRUB UR QL STRIP: ABNORMAL
BUN SERPL-MCNC: 14 MG/DL (ref 6–20)
CALCIUM SERPL-MCNC: 9.2 MG/DL (ref 8.7–10.5)
CHLORIDE SERPL-SCNC: 97 MMOL/L (ref 95–110)
CLARITY UR: CLEAR
CO2 SERPL-SCNC: 24 MMOL/L (ref 23–29)
COLOR UR: YELLOW
CREAT SERPL-MCNC: 1.6 MG/DL (ref 0.5–1.4)
DIFFERENTIAL METHOD: ABNORMAL
EOSINOPHIL # BLD AUTO: 0.1 K/UL (ref 0–0.5)
EOSINOPHIL NFR BLD: 0.8 % (ref 0–8)
ERYTHROCYTE [DISTWIDTH] IN BLOOD BY AUTOMATED COUNT: 16.1 % (ref 11.5–14.5)
EST. GFR  (AFRICAN AMERICAN): 59 ML/MIN/1.73 M^2
EST. GFR  (NON AFRICAN AMERICAN): 51 ML/MIN/1.73 M^2
GLUCOSE SERPL-MCNC: 111 MG/DL (ref 70–110)
GLUCOSE UR QL STRIP: NEGATIVE
GRAN CASTS #/AREA URNS LPF: 1 /LPF
HCT VFR BLD AUTO: 34.2 % (ref 40–54)
HGB BLD-MCNC: 11.6 G/DL (ref 14–18)
HGB UR QL STRIP: NEGATIVE
HYALINE CASTS #/AREA URNS LPF: 34 /LPF
IMM GRANULOCYTES # BLD AUTO: 0.02 K/UL (ref 0–0.04)
IMM GRANULOCYTES NFR BLD AUTO: 0.3 % (ref 0–0.5)
KETONES UR QL STRIP: ABNORMAL
LEUKOCYTE ESTERASE UR QL STRIP: ABNORMAL
LIPASE SERPL-CCNC: 57 U/L (ref 4–60)
LYMPHOCYTES # BLD AUTO: 2.1 K/UL (ref 1–4.8)
LYMPHOCYTES NFR BLD: 28.8 % (ref 18–48)
MCH RBC QN AUTO: 34.8 PG (ref 27–31)
MCHC RBC AUTO-ENTMCNC: 33.9 G/DL (ref 32–36)
MCV RBC AUTO: 103 FL (ref 82–98)
MICROSCOPIC COMMENT: ABNORMAL
MONOCYTES # BLD AUTO: 0.8 K/UL (ref 0.3–1)
MONOCYTES NFR BLD: 10.3 % (ref 4–15)
NEUTROPHILS # BLD AUTO: 4.3 K/UL (ref 1.8–7.7)
NEUTROPHILS NFR BLD: 59.4 % (ref 38–73)
NITRITE UR QL STRIP: NEGATIVE
NON-SQ EPI CELLS #/AREA URNS HPF: 0 /HPF
NRBC BLD-RTO: 0 /100 WBC
PH UR STRIP: 7 [PH] (ref 5–8)
PLATELET # BLD AUTO: 106 K/UL (ref 150–350)
PMV BLD AUTO: 11.5 FL (ref 9.2–12.9)
POTASSIUM SERPL-SCNC: 3.6 MMOL/L (ref 3.5–5.1)
PROT SERPL-MCNC: 9.5 G/DL (ref 6–8.4)
PROT UR QL STRIP: NEGATIVE
RBC # BLD AUTO: 3.33 M/UL (ref 4.6–6.2)
RBC #/AREA URNS HPF: 6 /HPF (ref 0–4)
SODIUM SERPL-SCNC: 136 MMOL/L (ref 136–145)
SP GR UR STRIP: 1.02 (ref 1–1.03)
SQUAMOUS #/AREA URNS HPF: 0 /HPF
URN SPEC COLLECT METH UR: ABNORMAL
UROBILINOGEN UR STRIP-ACNC: >=8 EU/DL
WBC # BLD AUTO: 7.26 K/UL (ref 3.9–12.7)
WBC #/AREA URNS HPF: 3 /HPF (ref 0–5)

## 2020-09-03 PROCEDURE — 25000003 PHARM REV CODE 250: Performed by: EMERGENCY MEDICINE

## 2020-09-03 PROCEDURE — 96375 TX/PRO/DX INJ NEW DRUG ADDON: CPT

## 2020-09-03 PROCEDURE — 81000 URINALYSIS NONAUTO W/SCOPE: CPT

## 2020-09-03 PROCEDURE — 85025 COMPLETE CBC W/AUTO DIFF WBC: CPT

## 2020-09-03 PROCEDURE — 96374 THER/PROPH/DIAG INJ IV PUSH: CPT

## 2020-09-03 PROCEDURE — 80053 COMPREHEN METABOLIC PANEL: CPT

## 2020-09-03 PROCEDURE — 25500020 PHARM REV CODE 255: Performed by: EMERGENCY MEDICINE

## 2020-09-03 PROCEDURE — 99285 EMERGENCY DEPT VISIT HI MDM: CPT | Mod: 25

## 2020-09-03 PROCEDURE — 96361 HYDRATE IV INFUSION ADD-ON: CPT

## 2020-09-03 PROCEDURE — 63600175 PHARM REV CODE 636 W HCPCS: Performed by: EMERGENCY MEDICINE

## 2020-09-03 PROCEDURE — 83690 ASSAY OF LIPASE: CPT

## 2020-09-03 RX ORDER — METRONIDAZOLE 500 MG/1
500 TABLET ORAL 3 TIMES DAILY
Qty: 21 TABLET | Refills: 0 | Status: ON HOLD | OUTPATIENT
Start: 2020-09-03 | End: 2020-09-09 | Stop reason: HOSPADM

## 2020-09-03 RX ORDER — HYDROMORPHONE HYDROCHLORIDE 1 MG/ML
0.5 INJECTION, SOLUTION INTRAMUSCULAR; INTRAVENOUS; SUBCUTANEOUS
Status: COMPLETED | OUTPATIENT
Start: 2020-09-03 | End: 2020-09-03

## 2020-09-03 RX ORDER — ONDANSETRON 4 MG/1
4 TABLET, ORALLY DISINTEGRATING ORAL EVERY 8 HOURS PRN
Qty: 12 TABLET | Refills: 0 | Status: SHIPPED | OUTPATIENT
Start: 2020-09-03 | End: 2021-05-03

## 2020-09-03 RX ORDER — SULFAMETHOXAZOLE AND TRIMETHOPRIM 800; 160 MG/1; MG/1
1 TABLET ORAL 2 TIMES DAILY
Qty: 14 TABLET | Refills: 0 | Status: ON HOLD | OUTPATIENT
Start: 2020-09-03 | End: 2020-09-09 | Stop reason: HOSPADM

## 2020-09-03 RX ORDER — OXYCODONE AND ACETAMINOPHEN 5; 325 MG/1; MG/1
1 TABLET ORAL EVERY 6 HOURS PRN
Qty: 8 TABLET | Refills: 0 | Status: ON HOLD | OUTPATIENT
Start: 2020-09-03 | End: 2020-10-27 | Stop reason: HOSPADM

## 2020-09-03 RX ORDER — ONDANSETRON 2 MG/ML
4 INJECTION INTRAMUSCULAR; INTRAVENOUS
Status: COMPLETED | OUTPATIENT
Start: 2020-09-03 | End: 2020-09-03

## 2020-09-03 RX ADMIN — HYDROMORPHONE HYDROCHLORIDE 0.5 MG: 1 INJECTION, SOLUTION INTRAMUSCULAR; INTRAVENOUS; SUBCUTANEOUS at 09:09

## 2020-09-03 RX ADMIN — IOHEXOL 100 ML: 350 INJECTION, SOLUTION INTRAVENOUS at 11:09

## 2020-09-03 RX ADMIN — ONDANSETRON 4 MG: 2 INJECTION INTRAMUSCULAR; INTRAVENOUS at 09:09

## 2020-09-03 RX ADMIN — SODIUM CHLORIDE 1000 ML: 0.9 INJECTION, SOLUTION INTRAVENOUS at 09:09

## 2020-09-03 NOTE — ED NOTES
Report received from OTIS Syed.  Pt AAO x 4 and NAD noted.  Updated on POC.  Urinal at bedside to address bathroom needs.  Call light within reach and rails up x 2.

## 2020-09-03 NOTE — ED NOTES
"Pt to ED with c/o upper abdomina,l pain x1 week with constipation and N/V. PMH of cirrhosis, HTN, diverticulitis. Pt reporting "burning and thumping" sensation to upper abdomen, unrelieved with home medications. Pt also without a BM x3 days, unable to past gas. TTP to epigastric region. Denies blood in stool or emesis, recent ETOH use. Pt connected to continuous cardiac monitoring, pulse ox, BP cycled. Will continue to monitor.     Two patient identifiers have been checked and are correct.      Appearance: Pt awake, alert & oriented to person, place & time. Pt in no acute distress at present time. Pt is clean and well groomed with clothes appropriately fastened.   Skin: Skin warm, dry & intact. Color consistent with ethnicity. Mucous membranes moist. No breakdown or brusing noted.   Musculoskeletal: Patient moving all extremities well, no obvious swelling or deformities noted.   Respiratory: Respirations spontaneous, even, and non-labored. Visible chest rise noted. Airway is open and patent. No accessory muscle use noted.   Neurologic: Sensation is intact. Speech is clear and appropriate. Eyes open spontaneously, behavior appropriate to situation, follows commands, facial expression symmetrical, bilateral hand grasp equal and even, purposeful motor response noted.  Cardiac: All peripheral pulses present. No Bilateral lower extremity edema. Cap refill is <3 seconds.  Abdomen: Epigastric TTP, intermittent N/V, constipation x3 days.   : Pt reports no dysuria or hematuria.   "

## 2020-09-03 NOTE — ED PROVIDER NOTES
Encounter Date: 9/3/2020    SCRIBE #1 NOTE: IArthur, am scribing for, and in the presence of, Dr. Ibanez.       History     Chief Complaint   Patient presents with    Abdominal Pain     pt with c/o abdominal pain and self vomitng and not eating. pt without bowel movement x 3 days. pt with pmh of gi bleeding.      This is a 44 y.o. male with a history of HTN, alcoholic cirrhosis, and PE who presents with complaint of abdominal pain that began one week ago. He describes the abdominal ain as an intermittent sharp burning sensation. The pain is exacerbated with eating. He reports that eating causes him to vomit. He was seen by his hepatologist yesterday and was told that if his symptoms had continued, to go to the ER for further evaluation. He also reports that when he takes a deep breath the abdominal pain radiates into his chest. He denies fever, chills, sore throat, chest pain, diarrhea, and dysuria.     The history is provided by the patient.     Review of patient's allergies indicates:   Allergen Reactions    Ciprofloxacin hcl Hallucinations    Morphine Itching     Past Medical History:   Diagnosis Date    Alcoholic cirrhosis of liver     Arthritis     ATN (acute tubular necrosis)     Diverticulitis 01/2020    GI bleed     Hip arthritis     Left    Hypertension     Macrocytic anemia     Pulmonary embolism 08/2018    Unprovoked DVT.  Stop Coumadin due to GIB    Thrombocytopenia      Past Surgical History:   Procedure Laterality Date    ANKLE SURGERY      COLON SURGERY  2007    [facility]    ESOPHAGOGASTRODUODENOSCOPY N/A 7/26/2019    Procedure: EGD (ESOPHAGOGASTRODUODENOSCOPY);  Surgeon: Brian Trivedi MD;  Location: [facility];  Service: Endoscopy;  Laterality: N/A;    ESOPHAGOGASTRODUODENOSCOPY N/A 4/8/2020    Procedure: EGD (ESOPHAGOGASTRODUODENOSCOPY);  Surgeon: Donn Acuna MD;  Location: [facility];  Service: Endoscopy;  Laterality: N/A;    HERNIA REPAIR  Left     Inguinal     Family History   Problem Relation Age of Onset    Hypertension Mother     Breast cancer Mother     Hypertension Father     Prostate cancer Father     Bladder Cancer Father      Social History     Tobacco Use    Smoking status: Former Smoker     Quit date: 2000     Years since quittin.6    Smokeless tobacco: Never Used    Tobacco comment: quit 20 years ago   Substance Use Topics    Alcohol use: Not Currently     Comment: freq    Drug use: Not Currently     Types: Marijuana     Review of Systems   Constitutional: Negative for chills and fever.   HENT: Negative for congestion and sore throat.    Eyes: Negative for photophobia and redness.   Respiratory: Negative for cough and shortness of breath.    Cardiovascular: Negative for chest pain.   Gastrointestinal: Positive for abdominal pain, nausea and vomiting. Negative for diarrhea.   Genitourinary: Negative for dysuria.   Musculoskeletal: Negative for back pain.   Skin: Negative for rash.   Neurological: Negative for weakness, light-headedness and headaches.   Psychiatric/Behavioral: Negative for confusion.       Physical Exam     Initial Vitals [20 0852]   BP Pulse Resp Temp SpO2   (!) 166/84 93 18 97.8 °F (36.6 °C) 99 %      MAP       --         Physical Exam    Nursing note and vitals reviewed.  Constitutional: He appears well-developed and well-nourished. He is not diaphoretic. No distress.   HENT:   Head: Normocephalic and atraumatic.   Right Ear: External ear normal.   Left Ear: External ear normal.   Eyes: EOM are normal. Pupils are equal, round, and reactive to light. Right eye exhibits no discharge. Left eye exhibits no discharge.   Neck: Normal range of motion.   Cardiovascular: Normal rate, regular rhythm and normal heart sounds. Exam reveals no gallop and no friction rub.    No murmur heard.  Pulmonary/Chest: Breath sounds normal. No respiratory distress. He has no wheezes. He has no rhonchi. He has no rales.    Abdominal: Soft. There is abdominal tenderness. There is no rebound and no guarding.   Mild left sided abdominal tenderness.   Musculoskeletal: Normal range of motion. No tenderness or edema.   Neurological: He is alert and oriented to person, place, and time.   Skin: Skin is warm and dry. No rash and no abscess noted. No erythema. No pallor.   Psychiatric: He has a normal mood and affect. His behavior is normal. Judgment and thought content normal.         ED Course   Procedures  Labs Reviewed   CBC W/ AUTO DIFFERENTIAL - Abnormal; Notable for the following components:       Result Value    RBC 3.33 (*)     Hemoglobin 11.6 (*)     Hematocrit 34.2 (*)     Mean Corpuscular Volume 103 (*)     Mean Corpuscular Hemoglobin 34.8 (*)     RDW 16.1 (*)     Platelets 106 (*)     All other components within normal limits   COMPREHENSIVE METABOLIC PANEL - Abnormal; Notable for the following components:    Glucose 111 (*)     Creatinine 1.6 (*)     Total Protein 9.5 (*)     Total Bilirubin 4.6 (*)     AST 93 (*)     ALT 46 (*)     eGFR if  59 (*)     eGFR if non  51 (*)     All other components within normal limits   URINALYSIS, REFLEX TO URINE CULTURE - Abnormal; Notable for the following components:    Ketones, UA Trace (*)     Bilirubin (UA) 2+ (*)     Urobilinogen, UA >=8.0 (*)     Leukocytes, UA Trace (*)     All other components within normal limits    Narrative:     Specimen Source->Urine   URINALYSIS MICROSCOPIC - Abnormal; Notable for the following components:    RBC, UA 6 (*)     Bacteria Few (*)     Hyaline Casts, UA 34. (*)     Granular Casts, UA 1 (*)     All other components within normal limits    Narrative:     Specimen Source->Urine   LIPASE          Imaging Results          CT Abdomen Pelvis With Contrast (Final result)  Result time 09/03/20 12:17:06    Final result by Jacek Shultz MD (09/03/20 12:17:06)                 Impression:      Minimal inflammatory changes  adjacent to the left hemicolon, though significantly improved when compared with multiple prior studies.  Findings may reflect early or resolving colitis in the appropriate clinical setting.  Portal colopathy is also a consideration.    Cirrhotic liver morphology and stigmata of portal hypertension including mild splenomegaly with mesenteric collateral vessels and recanalized umbilical vein.    Stable osteonecrosis of the left hip with associated joint effusion.    Postoperative changes of right hemicolectomy.    Small hiatal hernia.      Electronically signed by: Jacek Shultz MD  Date:    09/03/2020  Time:    12:17             Narrative:    EXAMINATION:  CT ABDOMEN PELVIS WITH CONTRAST    CLINICAL HISTORY:  Bowel obstruction high-grade suspected;    TECHNIQUE:  Low dose axial images, sagittal and coronal reformations were obtained from the lung bases to the pubic symphysis following the IV administration of 100 mL of Omnipaque 350 .  Oral contrast was not given.    COMPARISON:  CT abdomen and pelvis without contrast, 01/09/2020.  CT abdomen and pelvis with contrast, 08/18/2019.    FINDINGS:  Lower Chest:    Mild bibasilar subsegmental atelectasis.  Heart size is normal.  No pleural or pericardial effusion.  Small hiatal hernia.    Abdomen:    Cirrhotic liver morphology with mildly heterogeneous parenchymal attenuation and nodular contour.  No convincing focal hepatic mass on this single phase study.  Gallbladder is unremarkable. No intrahepatic biliary ductal dilatation.    Spleen is mildly enlarged but stable.  Adrenal glands and pancreas are unremarkable.    The kidneys are symmetric.  No hydronephrosis. Mild symmetric bilateral perinephric fat stranding.    No evidence of small-bowel obstruction.  There are postoperative changes of right hemicolectomy.  Multiple colonic diverticula in the descending and sigmoid colon.  Minimal fat stranding in the left pericolic gutter, noting the presence of scattered  mesenteric edema elsewhere in the abdomen.  Degree of inflammatory changes adjacent to the left hemicolon has improved when compared with multiple prior studies.    No pneumoperitoneum or organized fluid collection.    No bulky lymphadenopathy.    Abdominal aorta is normal in caliber with mild calcific atherosclerosis.    Portal, splenic, and superior mesenteric veins are patent.  There are prominent mesenteric collateral vessels.  The umbilical vein is recanalized.    Pelvis:    Urinary bladder is decompressed and not well evaluated.  Rectum is unremarkable.  No significant pelvic free fluid.  No pelvic lymphadenopathy.    Bones and soft tissues:    Chronic erosive changes and femoral head collapse consistent with osteonecrosis, similar to the prior study.  There is a left hip joint effusion, also similar to the prior exam.  No new aggressive osseous lesion.  Minimal body wall edema.                                 Medical Decision Making:   History:   Old Medical Records: I decided to obtain old medical records.            Scribe Attestation:   Scribe #1: I performed the above scribed service and the documentation accurately describes the services I performed. I attest to the accuracy of the note.    Attending Attestation:           Physician Attestation for Scribe:  Physician Attestation Statement for Scribe #1: I, Dr. Ibanez, reviewed documentation, as scribed by Arthur Palmer in my presence, and it is both accurate and complete.         Attending ED Notes:   Emergent evaluation a 44-year-old male with left lower quadrant abdominal pain.  Patient is afebrile, nontoxic appearing with stable vital signs.  Urinary analysis reveals ketones, bilirubin and trace leukocytes with few bacteria.  Patient with no urinary complaints.  No flank tenderness to palpation.  Patient has no elevation white blood cell count.  H&H 11.6 and 34.2.  No acute findings on CMP except for creatinine of 1.6 and an elevation in liver  enzymes including total bili of 4.6, AST of 93 and ALT of 46.  Patient has a known history of cirrhosis of the liver and states that he is on the transplant list.  Lipase is 57.  CT scan reveals cirrhosis of liver, small hiatal hernia and inflammatory changes adjusted to the left hemicolon suggestive of colitis.  The patient is extensively counseled on his diagnosis and treatment including all diagnostic, laboratory and physical exam findings.  The patient discharged good condition and directed follow-up with his PCP in the next 24-48 hours.                        Clinical Impression:     1. LLQ abdominal pain    2. Colitis    3. Hiatal hernia    4. Cirrhosis of liver without ascites, unspecified hepatic cirrhosis type    5. Renal insufficiency              ED Disposition Condition    Discharge Good        ED Prescriptions     Medication Sig Dispense Start Date End Date Auth. Provider    oxyCODONE-acetaminophen (PERCOCET) 5-325 mg per tablet Take 1 tablet by mouth every 6 (six) hours as needed for Pain. 8 tablet 9/3/2020  Edouard Haji MD    ondansetron (ZOFRAN ODT) 4 MG TbDL Take 1 tablet (4 mg total) by mouth every 8 (eight) hours as needed (Nausea and Vomiting). 12 tablet 9/3/2020  Edouard Haji MD    sulfamethoxazole-trimethoprim 800-160mg (BACTRIM DS) 800-160 mg Tab Take 1 tablet by mouth 2 (two) times daily. for 7 days 14 tablet 9/3/2020 9/10/2020 Edouard Haji MD    metroNIDAZOLE (FLAGYL) 500 MG tablet Take 1 tablet (500 mg total) by mouth 3 (three) times daily. for 7 days 21 tablet 9/3/2020 9/10/2020 Edouard Haji MD        Follow-up Information     Follow up With Specialties Details Why Contact Info    [facility]STRO[facility] Gastroenterology In 2 days  [address] [address]  [phone]                                     Edouard Haji MD  09/03/20 [id number]

## 2020-09-06 ENCOUNTER — HOSPITAL ENCOUNTER (EMERGENCY)
Facility: OTHER | Age: 45
Discharge: HOME | End: 2020-09-06
Attending: EMERGENCY MEDICINE
Payer: MEDICAID

## 2020-09-06 VITALS
DIASTOLIC BLOOD PRESSURE: 87 MMHG | TEMPERATURE: 98 F | OXYGEN SATURATION: 97 % | HEART RATE: 69 BPM | BODY MASS INDEX: 25.73 KG/M2 | HEIGHT: 72 IN | RESPIRATION RATE: 19 BRPM | SYSTOLIC BLOOD PRESSURE: 147 MMHG | WEIGHT: 190 LBS

## 2020-09-06 DIAGNOSIS — R10.84 GENERALIZED ABDOMINAL PAIN: Primary | ICD-10-CM

## 2020-09-06 DIAGNOSIS — E87.6 HYPOKALEMIA: ICD-10-CM

## 2020-09-06 DIAGNOSIS — D64.9 ANEMIA, UNSPECIFIED TYPE: ICD-10-CM

## 2020-09-06 DIAGNOSIS — R10.9 ABDOMINAL CRAMPING: ICD-10-CM

## 2020-09-06 DIAGNOSIS — R11.2 NON-INTRACTABLE VOMITING WITH NAUSEA, UNSPECIFIED VOMITING TYPE: ICD-10-CM

## 2020-09-06 DIAGNOSIS — R74.8 ELEVATED LIVER ENZYMES: ICD-10-CM

## 2020-09-06 DIAGNOSIS — N28.9 RENAL INSUFFICIENCY: ICD-10-CM

## 2020-09-06 LAB
ALBUMIN SERPL BCP-MCNC: 4.1 G/DL (ref 3.5–5.2)
ALP SERPL-CCNC: 119 U/L (ref 55–135)
ALT SERPL W/O P-5'-P-CCNC: 47 U/L (ref 10–44)
ANION GAP SERPL CALC-SCNC: 16 MMOL/L (ref 8–16)
AST SERPL-CCNC: 88 U/L (ref 10–40)
BASOPHILS # BLD AUTO: 0.03 K/UL (ref 0–0.2)
BASOPHILS NFR BLD: 0.5 % (ref 0–1.9)
BILIRUB SERPL-MCNC: 4.1 MG/DL (ref 0.1–1)
BILIRUB UR QL STRIP: NEGATIVE
BUN SERPL-MCNC: 12 MG/DL (ref 6–20)
CALCIUM SERPL-MCNC: 9.7 MG/DL (ref 8.7–10.5)
CHLORIDE SERPL-SCNC: 99 MMOL/L (ref 95–110)
CLARITY UR: CLEAR
CO2 SERPL-SCNC: 27 MMOL/L (ref 23–29)
COLOR UR: YELLOW
CREAT SERPL-MCNC: 1.7 MG/DL (ref 0.5–1.4)
DIFFERENTIAL METHOD: ABNORMAL
EOSINOPHIL # BLD AUTO: 0 K/UL (ref 0–0.5)
EOSINOPHIL NFR BLD: 0.2 % (ref 0–8)
ERYTHROCYTE [DISTWIDTH] IN BLOOD BY AUTOMATED COUNT: 16.1 % (ref 11.5–14.5)
EST. GFR  (AFRICAN AMERICAN): 55 ML/MIN/1.73 M^2
EST. GFR  (NON AFRICAN AMERICAN): 48 ML/MIN/1.73 M^2
GLUCOSE SERPL-MCNC: 102 MG/DL (ref 70–110)
GLUCOSE UR QL STRIP: NEGATIVE
HCT VFR BLD AUTO: 37.4 % (ref 40–54)
HGB BLD-MCNC: 12.4 G/DL (ref 14–18)
HGB UR QL STRIP: NEGATIVE
IMM GRANULOCYTES # BLD AUTO: 0.01 K/UL (ref 0–0.04)
IMM GRANULOCYTES NFR BLD AUTO: 0.2 % (ref 0–0.5)
KETONES UR QL STRIP: NEGATIVE
LACTATE SERPL-SCNC: 2.5 MMOL/L (ref 0.5–2.2)
LEUKOCYTE ESTERASE UR QL STRIP: NEGATIVE
LIPASE SERPL-CCNC: 51 U/L (ref 4–60)
LYMPHOCYTES # BLD AUTO: 1 K/UL (ref 1–4.8)
LYMPHOCYTES NFR BLD: 17.7 % (ref 18–48)
MCH RBC QN AUTO: 34.5 PG (ref 27–31)
MCHC RBC AUTO-ENTMCNC: 33.2 G/DL (ref 32–36)
MCV RBC AUTO: 104 FL (ref 82–98)
MONOCYTES # BLD AUTO: 0.5 K/UL (ref 0.3–1)
MONOCYTES NFR BLD: 8.5 % (ref 4–15)
NEUTROPHILS # BLD AUTO: 4 K/UL (ref 1.8–7.7)
NEUTROPHILS NFR BLD: 72.9 % (ref 38–73)
NITRITE UR QL STRIP: NEGATIVE
NRBC BLD-RTO: 0 /100 WBC
PH UR STRIP: 8 [PH] (ref 5–8)
PLATELET # BLD AUTO: 104 K/UL (ref 150–350)
PMV BLD AUTO: 11.7 FL (ref 9.2–12.9)
POTASSIUM SERPL-SCNC: 3.4 MMOL/L (ref 3.5–5.1)
PROT SERPL-MCNC: 10 G/DL (ref 6–8.4)
PROT UR QL STRIP: NEGATIVE
RBC # BLD AUTO: 3.59 M/UL (ref 4.6–6.2)
SARS-COV-2 RDRP RESP QL NAA+PROBE: NEGATIVE
SODIUM SERPL-SCNC: 142 MMOL/L (ref 136–145)
SP GR UR STRIP: 1.02 (ref 1–1.03)
URN SPEC COLLECT METH UR: ABNORMAL
UROBILINOGEN UR STRIP-ACNC: ABNORMAL EU/DL
WBC # BLD AUTO: 5.54 K/UL (ref 3.9–12.7)

## 2020-09-06 PROCEDURE — 99285 EMERGENCY DEPT VISIT HI MDM: CPT | Mod: 25

## 2020-09-06 PROCEDURE — 85025 COMPLETE CBC W/AUTO DIFF WBC: CPT

## 2020-09-06 PROCEDURE — 96375 TX/PRO/DX INJ NEW DRUG ADDON: CPT | Mod: 59

## 2020-09-06 PROCEDURE — U0002 COVID-19 LAB TEST NON-CDC: HCPCS

## 2020-09-06 PROCEDURE — 96376 TX/PRO/DX INJ SAME DRUG ADON: CPT | Mod: 59

## 2020-09-06 PROCEDURE — 83690 ASSAY OF LIPASE: CPT

## 2020-09-06 PROCEDURE — 80053 COMPREHEN METABOLIC PANEL: CPT

## 2020-09-06 PROCEDURE — 96374 THER/PROPH/DIAG INJ IV PUSH: CPT

## 2020-09-06 PROCEDURE — 96361 HYDRATE IV INFUSION ADD-ON: CPT

## 2020-09-06 PROCEDURE — 81003 URINALYSIS AUTO W/O SCOPE: CPT

## 2020-09-06 PROCEDURE — 25500020 PHARM REV CODE 255: Performed by: EMERGENCY MEDICINE

## 2020-09-06 PROCEDURE — 63600175 PHARM REV CODE 636 W HCPCS: Performed by: EMERGENCY MEDICINE

## 2020-09-06 PROCEDURE — 83605 ASSAY OF LACTIC ACID: CPT

## 2020-09-06 PROCEDURE — 25000003 PHARM REV CODE 250: Performed by: EMERGENCY MEDICINE

## 2020-09-06 RX ORDER — HYDROMORPHONE HYDROCHLORIDE 1 MG/ML
0.5 INJECTION, SOLUTION INTRAMUSCULAR; INTRAVENOUS; SUBCUTANEOUS
Status: COMPLETED | OUTPATIENT
Start: 2020-09-06 | End: 2020-09-06

## 2020-09-06 RX ORDER — ONDANSETRON 2 MG/ML
4 INJECTION INTRAMUSCULAR; INTRAVENOUS
Status: COMPLETED | OUTPATIENT
Start: 2020-09-06 | End: 2020-09-06

## 2020-09-06 RX ADMIN — ONDANSETRON 4 MG: 2 INJECTION INTRAMUSCULAR; INTRAVENOUS at 01:09

## 2020-09-06 RX ADMIN — SODIUM CHLORIDE 1000 ML: 0.9 INJECTION, SOLUTION INTRAVENOUS at 01:09

## 2020-09-06 RX ADMIN — HYDROMORPHONE HYDROCHLORIDE 0.5 MG: 1 INJECTION, SOLUTION INTRAMUSCULAR; INTRAVENOUS; SUBCUTANEOUS at 03:09

## 2020-09-06 RX ADMIN — IOHEXOL 100 ML: 350 INJECTION, SOLUTION INTRAVENOUS at 04:09

## 2020-09-06 RX ADMIN — HYDROMORPHONE HYDROCHLORIDE 0.5 MG: 1 INJECTION, SOLUTION INTRAMUSCULAR; INTRAVENOUS; SUBCUTANEOUS at 01:09

## 2020-09-06 NOTE — ED PROVIDER NOTES
"Encounter Date: 9/6/2020    SCRIBE #1 NOTE: I, Edouard Shira, am scribing for, and in the presence of, Dr. Haji.       History     Chief Complaint   Patient presents with    Fatigue     Pt states lack of appetite, emesis, and generally not feeling well. Pt reports Hx of GI bleeds. Denies bloody emesis or stool     Time seen by provider: 1:17 PM     This is a 44 y.o. male with hx of alcoholic cirrhosis, diverticulitis, and GI bleeds who presents with complaint of nausea, vomiting, and "burning" abdominal pain for the past week. Pt was seen here on 9/3 for similar symptoms. He reports vomiting bile. He had a colon resection in 2007.    The history is provided by the patient.     Review of patient's allergies indicates:   Allergen Reactions    Ciprofloxacin hcl Hallucinations    Morphine Itching     Past Medical History:   Diagnosis Date    Alcoholic cirrhosis of liver     Arthritis     ATN (acute tubular necrosis)     Diverticulitis 01/2020    GI bleed     Hip arthritis     Left    Hypertension     Macrocytic anemia     Pulmonary embolism 08/2018    Unprovoked DVT.  Stop Coumadin due to GIB    Thrombocytopenia      Past Surgical History:   Procedure Laterality Date    ANKLE SURGERY      COLON SURGERY  2007    [facility]    ESOPHAGOGASTRODUODENOSCOPY N/A 7/26/2019    Procedure: EGD (ESOPHAGOGASTRODUODENOSCOPY);  Surgeon: Brian Trivedi MD;  Location: [facility];  Service: Endoscopy;  Laterality: N/A;    ESOPHAGOGASTRODUODENOSCOPY N/A 4/8/2020    Procedure: EGD (ESOPHAGOGASTRODUODENOSCOPY);  Surgeon: Donn Acuna MD;  Location: [facility];  Service: Endoscopy;  Laterality: N/A;    HERNIA REPAIR Left     Inguinal     Family History   Problem Relation Age of Onset    Hypertension Mother     Breast cancer Mother     Hypertension Father     Prostate cancer Father     Bladder Cancer Father      Social History     Tobacco Use    Smoking status: Former Smoker     Quit " date:      Years since quittin.6    Smokeless tobacco: Never Used    Tobacco comment: quit 20 years ago   Substance Use Topics    Alcohol use: Not Currently     Comment: freq    Drug use: Not Currently     Types: Marijuana     Review of Systems   Constitutional: Negative for fever.   HENT: Negative for sore throat.    Eyes: Negative for visual disturbance.   Respiratory: Negative for shortness of breath.    Cardiovascular: Negative for chest pain.   Gastrointestinal: Positive for abdominal pain, nausea and vomiting.   Genitourinary: Negative for dysuria.   Musculoskeletal: Negative for back pain.   Skin: Negative for rash.   Neurological: Negative for weakness.       Physical Exam     Initial Vitals [20 1200]   BP Pulse Resp Temp SpO2   (!) 167/86 87 16 98.9 °F (37.2 °C) 99 %      MAP       --         Physical Exam    Nursing note and vitals reviewed.  Constitutional: He appears well-developed and well-nourished. He is not diaphoretic. No distress.   HENT:   Head: Normocephalic and atraumatic.   Eyes: EOM are normal. Pupils are equal, round, and reactive to light.   Neck: Normal range of motion. Neck supple.   Cardiovascular: Normal rate, regular rhythm and normal heart sounds. Exam reveals no gallop and no friction rub.    No murmur heard.  Pulmonary/Chest: Breath sounds normal. No respiratory distress. He has no wheezes. He has no rhonchi. He has no rales.   Abdominal: Soft. Bowel sounds are normal. He exhibits no distension. There is abdominal tenderness (generalized tenderness).   Musculoskeletal: Normal range of motion. No tenderness or edema.   Neurological: He is alert and oriented to person, place, and time.   Skin: Skin is warm and dry.   Psychiatric: He has a normal mood and affect. His behavior is normal. Judgment and thought content normal.         ED Course   Procedures  Labs Reviewed   CBC W/ AUTO DIFFERENTIAL - Abnormal; Notable for the following components:       Result Value     RBC 3.59 (*)     Hemoglobin 12.4 (*)     Hematocrit 37.4 (*)     Mean Corpuscular Volume 104 (*)     Mean Corpuscular Hemoglobin 34.5 (*)     RDW 16.1 (*)     Platelets 104 (*)     Lymph% 17.7 (*)     All other components within normal limits   COMPREHENSIVE METABOLIC PANEL - Abnormal; Notable for the following components:    Potassium 3.4 (*)     Creatinine 1.7 (*)     Total Protein 10.0 (*)     Total Bilirubin 4.1 (*)     AST 88 (*)     ALT 47 (*)     eGFR if  55 (*)     eGFR if non  48 (*)     All other components within normal limits   URINALYSIS, REFLEX TO URINE CULTURE - Abnormal; Notable for the following components:    Urobilinogen, UA 4.0-6.0 (*)     All other components within normal limits    Narrative:     Specimen Source->Urine   LACTIC ACID, PLASMA - Abnormal; Notable for the following components:    Lactate (Lactic Acid) 2.5 (*)     All other components within normal limits   LIPASE   SARS-COV-2 RNA AMPLIFICATION, QUAL          Imaging Results          CTA Abdomen and Pelvis (Final result)  Result time 09/06/20 16:52:34    Final result by Manpreet Dorsey MD (09/06/20 16:52:34)                 Impression:      No convincing CT evidence for mesenteric ischemia.    Overall similar appearance to the recent comparison without detrimental change..      Electronically signed by: Manpreet Dorsey MD  Date:    09/06/2020  Time:    16:52             Narrative:    EXAMINATION:  CTA ABDOMEN AND PELVIS    CLINICAL HISTORY:  Mesenteric ischemia, acute;    TECHNIQUE:  Routine CT angiographic imaging of the abdomen/pelvis performed.  100 cc Omnipaque administered.    COMPARISON:  CT 09/03/2020    FINDINGS:  Lung bases clear.    Within the abdomen, liver cirrhotic changes present without focal lesion.  No intrinsic splenic abnormality.  Multiple upper abdominal and paraesophageal varices noted.  No biliary dilatation.  Portal vein patent.    Pancreas, adrenal glands and kidneys  are unremarkable.    Still stomach is unremarkable.  No small bowel dilatation.  No definite CT evidence of small bowel ischemia.  Colon demonstrates mild to moderate fecal material without definite wall thickening allowing for areas of nondistention.  Distal diverticuli present with faint fat stranding again noted adjacent to the left colon.  No significant ascites.  No portal venous gas.    Abdominal aorta opacifies normally.  Iliac vessels opacified normally.  Celiac and superior mesenteric arteries opacify normally.  Inferior mesenteric artery opacifies normal.  No definite mesenteric arterial abnormality appreciated.    Bladder unremarkable.  No pelvic mass or free fluid.    No acute osseous abnormality with left hip osteonecrosis and reactive joint effusion again noted.                               X-Ray Abdomen Flat And Erect (Final result)  Result time 09/06/20 14:38:59    Final result by Brady Hope MD (09/06/20 14:38:59)                 Impression:      1. Nonobstructive bowel gas pattern noting moderate to large amount of stool within the colon, particularly on the right.      Electronically signed by: Brady Hope MD  Date:    09/06/2020  Time:    14:38             Narrative:    EXAMINATION:  XR ABDOMEN FLAT AND ERECT    CLINICAL HISTORY:  Unspecified abdominal pain    TECHNIQUE:  Flat and erect AP views of the abdomen were performed.    COMPARISON:  03/23/2019, CT 09/03/2020    FINDINGS:  Two upright views, 2 supine views.    No significant air-fluid levels on upright view.  Air and stool is seen within the large bowel and projected over the rectum noting moderate stool throughout the colon.  No focally dilated small bowel loops.  There are no calcifications to suggest nephrolithiasis or cholelithiasis.  Surgical change projects over the right upper quadrant.  The visualized lower lung zones are grossly clear.  No large volume free air or pneumatosis.  The osseous structures are intact noting  severe degenerative change/posttraumatic change involving the left femur/acetabulum.                              X-Rays:   Independently Interpreted Readings:   Other Readings:  Abdomen: Moderate to large amount of stool in the colon. No air fluid levels. No free air under the diaphragm.    Medical Decision Making:   History:   Old Medical Records: I decided to obtain old medical records.  Independently Interpreted Test(s):   I have ordered and independently interpreted X-rays - see prior notes.  Clinical Tests:   Lab Tests: Ordered and Reviewed  Radiological Study: Ordered and Reviewed            Scribe Attestation:   Scribe #1: I performed the above scribed service and the documentation accurately describes the services I performed. I attest to the accuracy of the note.    Attending Attestation:           Physician Attestation for Scribe:  Physician Attestation Statement for Scribe #1: I, Dr. Haji, reviewed documentation, as scribed by Edouard Silva in my presence, and it is both accurate and complete.         Attending ED Notes:   Emergent evaluation of a 44-year-old male with past medical history of cirrhosis of the liver and recently diagnosed colitis now presents to the emergency room with nausea and vomiting and persistent abdominal pain.  Patient is afebrile, nontoxic-appearing stable vital signs except for elevation of blood pressure.  No acute findings on urinary analysis.  COVID screen is negative.  Patient has no elevation white blood cell count.  H&H 12.4 and 37.4.  Platelets are 104.  Potassium is 3.4, creatinine is 1.7, total bili of 4.1 with elevation in AST and ALT.  Lipase is 51.  Lactic acid is 2.5.  Concern for possible mesenteric ischemia.  CTA of abdomen and pelvis reveals no convincing evidence for mesenteric ischemia.  Overall similar appearance to recent CT scan 3 days ago.  The patient is extensively counseled on his diagnosis and treatment including all diagnostic, laboratory and  physical exam findings.  The patient is counseled on continuing his antibiotics, pain medicine and nausea medicine as directed.  The patient is discharged good condition, tolerating p.o. intake without complication and directed follow-up with Gastroenterology in the next 24-48 hours.                        Clinical Impression:     1. Generalized abdominal pain    2. Abdominal cramping    3. Non-intractable vomiting with nausea, unspecified vomiting type    4. Anemia, unspecified type    5. Renal insufficiency    6. Hypokalemia    7. Elevated liver enzymes                ED Disposition Condition    Discharge Good        ED Prescriptions     None        Follow-up Information     Follow up With Specialties Details Why Contact Info    [facility]STROENTEROLOG[facility] Gastroenterology In 2 days  [address] [facility] [address]  [phone]                                     Edouard Haji MD  09/06/20 [id number]

## 2020-09-06 NOTE — ED TRIAGE NOTES
"Pt reports to ED with c/o fatigue and vomiting x 1 week. States that he has hx of GI bleeds and he feels like he normally feels before it happens". Pt is also reported some generalized abdominal pain, 8/10.  States that he has not had a BM,  Reports 3 episodes of vomiting today denies any blood in vomit. Pt states that he was here on Thursday for same issue, reports that it is getting worse.   "

## 2020-09-08 ENCOUNTER — HOSPITAL ENCOUNTER (OUTPATIENT)
Facility: HOSPITAL | Age: 45
Discharge: HOME | End: 2020-09-09
Attending: EMERGENCY MEDICINE | Admitting: INTERNAL MEDICINE
Payer: MEDICAID

## 2020-09-08 DIAGNOSIS — R10.9 ABDOMINAL PAIN, UNSPECIFIED ABDOMINAL LOCATION: ICD-10-CM

## 2020-09-08 DIAGNOSIS — K52.9 COLITIS: ICD-10-CM

## 2020-09-08 DIAGNOSIS — R07.9 CHEST PAIN: ICD-10-CM

## 2020-09-08 DIAGNOSIS — R11.2 NAUSEA AND VOMITING, INTRACTABILITY OF VOMITING NOT SPECIFIED, UNSPECIFIED VOMITING TYPE: Primary | ICD-10-CM

## 2020-09-08 DIAGNOSIS — R11.2 NON-INTRACTABLE VOMITING WITH NAUSEA, UNSPECIFIED VOMITING TYPE: ICD-10-CM

## 2020-09-08 PROBLEM — N18.30 CKD (CHRONIC KIDNEY DISEASE) STAGE 3, GFR 30-59 ML/MIN: Chronic | Status: ACTIVE | Noted: 2020-04-08

## 2020-09-08 LAB
ALBUMIN SERPL BCP-MCNC: 3.8 G/DL (ref 3.5–5.2)
ALP SERPL-CCNC: 99 U/L (ref 55–135)
ALT SERPL W/O P-5'-P-CCNC: 50 U/L (ref 10–44)
AMPHET+METHAMPHET UR QL: NEGATIVE
ANION GAP SERPL CALC-SCNC: 16 MMOL/L (ref 8–16)
AST SERPL-CCNC: 93 U/L (ref 10–40)
BACTERIA #/AREA URNS HPF: NEGATIVE /HPF
BARBITURATES UR QL SCN>200 NG/ML: NEGATIVE
BASOPHILS # BLD AUTO: 0.06 K/UL (ref 0–0.2)
BASOPHILS NFR BLD: 0.8 % (ref 0–1.9)
BENZODIAZ UR QL SCN>200 NG/ML: NEGATIVE
BILIRUB SERPL-MCNC: 4.3 MG/DL (ref 0.1–1)
BILIRUB UR QL STRIP: NEGATIVE
BUN SERPL-MCNC: 12 MG/DL (ref 6–20)
BZE UR QL SCN: NEGATIVE
CALCIUM SERPL-MCNC: 9.1 MG/DL (ref 8.7–10.5)
CANNABINOIDS UR QL SCN: NORMAL
CHLORIDE SERPL-SCNC: 96 MMOL/L (ref 95–110)
CLARITY UR: ABNORMAL
CO2 SERPL-SCNC: 25 MMOL/L (ref 23–29)
COLOR UR: YELLOW
CREAT SERPL-MCNC: 1.5 MG/DL (ref 0.5–1.4)
CREAT UR-MCNC: 320 MG/DL (ref 23–375)
DIFFERENTIAL METHOD: ABNORMAL
EOSINOPHIL # BLD AUTO: 0 K/UL (ref 0–0.5)
EOSINOPHIL NFR BLD: 0.3 % (ref 0–8)
ERYTHROCYTE [DISTWIDTH] IN BLOOD BY AUTOMATED COUNT: 15.8 % (ref 11.5–14.5)
EST. GFR  (AFRICAN AMERICAN): >60 ML/MIN/1.73 M^2
EST. GFR  (NON AFRICAN AMERICAN): 55.4 ML/MIN/1.73 M^2
ETHANOL SERPL-MCNC: <5 MG/DL
GLUCOSE SERPL-MCNC: 133 MG/DL (ref 70–110)
GLUCOSE UR QL STRIP: NEGATIVE
HCT VFR BLD AUTO: 36.9 % (ref 40–54)
HGB BLD-MCNC: 12.5 G/DL (ref 14–18)
HGB UR QL STRIP: NEGATIVE
HYALINE CASTS #/AREA URNS LPF: 7 /LPF
IMM GRANULOCYTES # BLD AUTO: 0.01 K/UL (ref 0–0.04)
IMM GRANULOCYTES NFR BLD AUTO: 0.1 % (ref 0–0.5)
KETONES UR QL STRIP: NEGATIVE
LACTATE SERPL-SCNC: 1.2 MMOL/L (ref 0.5–1.9)
LACTATE SERPL-SCNC: 2.2 MMOL/L (ref 0.5–1.9)
LEUKOCYTE ESTERASE UR QL STRIP: NEGATIVE
LIPASE SERPL-CCNC: 43 U/L (ref 4–60)
LYMPHOCYTES # BLD AUTO: 1.7 K/UL (ref 1–4.8)
LYMPHOCYTES NFR BLD: 21.9 % (ref 18–48)
MCH RBC QN AUTO: 35.2 PG (ref 27–31)
MCHC RBC AUTO-ENTMCNC: 33.9 G/DL (ref 32–36)
MCV RBC AUTO: 104 FL (ref 82–98)
MICROSCOPIC COMMENT: ABNORMAL
MONOCYTES # BLD AUTO: 0.7 K/UL (ref 0.3–1)
MONOCYTES NFR BLD: 9.1 % (ref 4–15)
NEUTROPHILS # BLD AUTO: 5.2 K/UL (ref 1.8–7.7)
NEUTROPHILS NFR BLD: 67.8 % (ref 38–73)
NITRITE UR QL STRIP: NEGATIVE
NRBC BLD-RTO: 0 /100 WBC
OPIATES UR QL SCN: NEGATIVE
PCP UR QL SCN>25 NG/ML: NEGATIVE
PH UR STRIP: 8 [PH] (ref 5–8)
PLATELET # BLD AUTO: 116 K/UL (ref 150–350)
PMV BLD AUTO: 11.4 FL (ref 9.2–12.9)
POTASSIUM SERPL-SCNC: 3.1 MMOL/L (ref 3.5–5.1)
PROT SERPL-MCNC: 9 G/DL (ref 6–8.4)
PROT UR QL STRIP: ABNORMAL
RBC # BLD AUTO: 3.55 M/UL (ref 4.6–6.2)
RBC #/AREA URNS HPF: 1 /HPF (ref 0–4)
SARS-COV-2 RDRP RESP QL NAA+PROBE: NEGATIVE
SODIUM SERPL-SCNC: 137 MMOL/L (ref 136–145)
SP GR UR STRIP: 1.02 (ref 1–1.03)
SQUAMOUS #/AREA URNS HPF: 1 /HPF
TOXICOLOGY INFORMATION: NORMAL
URN SPEC COLLECT METH UR: ABNORMAL
UROBILINOGEN UR STRIP-ACNC: >=8 EU/DL
WBC # BLD AUTO: 7.68 K/UL (ref 3.9–12.7)
WBC #/AREA URNS HPF: 1 /HPF (ref 0–5)

## 2020-09-08 PROCEDURE — 83690 ASSAY OF LIPASE: CPT

## 2020-09-08 PROCEDURE — 85025 COMPLETE CBC W/AUTO DIFF WBC: CPT

## 2020-09-08 PROCEDURE — U0002 COVID-19 LAB TEST NON-CDC: HCPCS

## 2020-09-08 PROCEDURE — 99285 EMERGENCY DEPT VISIT HI MDM: CPT | Mod: 25

## 2020-09-08 PROCEDURE — 96376 TX/PRO/DX INJ SAME DRUG ADON: CPT

## 2020-09-08 PROCEDURE — 63600175 PHARM REV CODE 636 W HCPCS: Performed by: EMERGENCY MEDICINE

## 2020-09-08 PROCEDURE — 96374 THER/PROPH/DIAG INJ IV PUSH: CPT

## 2020-09-08 PROCEDURE — 83605 ASSAY OF LACTIC ACID: CPT | Mod: 91

## 2020-09-08 PROCEDURE — 36415 COLL VENOUS BLD VENIPUNCTURE: CPT

## 2020-09-08 PROCEDURE — 80307 DRUG TEST PRSMV CHEM ANLYZR: CPT

## 2020-09-08 PROCEDURE — 81001 URINALYSIS AUTO W/SCOPE: CPT

## 2020-09-08 PROCEDURE — 96375 TX/PRO/DX INJ NEW DRUG ADDON: CPT

## 2020-09-08 PROCEDURE — 25000003 PHARM REV CODE 250: Performed by: INTERNAL MEDICINE

## 2020-09-08 PROCEDURE — 83605 ASSAY OF LACTIC ACID: CPT

## 2020-09-08 PROCEDURE — 25500020 PHARM REV CODE 255: Performed by: EMERGENCY MEDICINE

## 2020-09-08 PROCEDURE — S0030 INJECTION, METRONIDAZOLE: HCPCS | Performed by: EMERGENCY MEDICINE

## 2020-09-08 PROCEDURE — G0378 HOSPITAL OBSERVATION PER HR: HCPCS

## 2020-09-08 PROCEDURE — 96361 HYDRATE IV INFUSION ADD-ON: CPT

## 2020-09-08 PROCEDURE — 25000003 PHARM REV CODE 250: Performed by: EMERGENCY MEDICINE

## 2020-09-08 PROCEDURE — 80320 DRUG SCREEN QUANTALCOHOLS: CPT

## 2020-09-08 PROCEDURE — 80053 COMPREHEN METABOLIC PANEL: CPT

## 2020-09-08 PROCEDURE — 63600175 PHARM REV CODE 636 W HCPCS: Performed by: INTERNAL MEDICINE

## 2020-09-08 RX ORDER — LEVOFLOXACIN 5 MG/ML
500 INJECTION, SOLUTION INTRAVENOUS
Status: DISCONTINUED | OUTPATIENT
Start: 2020-09-08 | End: 2020-09-09 | Stop reason: HOSPADM

## 2020-09-08 RX ORDER — AMLODIPINE BESYLATE 5 MG/1
10 TABLET ORAL DAILY
Status: DISCONTINUED | OUTPATIENT
Start: 2020-09-08 | End: 2020-09-09 | Stop reason: HOSPADM

## 2020-09-08 RX ORDER — CIPROFLOXACIN 2 MG/ML
400 INJECTION, SOLUTION INTRAVENOUS
Status: DISCONTINUED | OUTPATIENT
Start: 2020-09-08 | End: 2020-09-08

## 2020-09-08 RX ORDER — POLYETHYLENE GLYCOL 3350 17 G/17G
17 POWDER, FOR SOLUTION ORAL 2 TIMES DAILY
Status: DISCONTINUED | OUTPATIENT
Start: 2020-09-08 | End: 2020-09-09 | Stop reason: HOSPADM

## 2020-09-08 RX ORDER — GLUCAGON 1 MG
1 KIT INJECTION
Status: DISCONTINUED | OUTPATIENT
Start: 2020-09-08 | End: 2020-09-09 | Stop reason: HOSPADM

## 2020-09-08 RX ORDER — HYDROMORPHONE HYDROCHLORIDE 1 MG/ML
0.25 INJECTION, SOLUTION INTRAMUSCULAR; INTRAVENOUS; SUBCUTANEOUS EVERY 6 HOURS PRN
Status: DISCONTINUED | OUTPATIENT
Start: 2020-09-08 | End: 2020-09-09 | Stop reason: HOSPADM

## 2020-09-08 RX ORDER — HYDROMORPHONE HYDROCHLORIDE 1 MG/ML
0.25 INJECTION, SOLUTION INTRAMUSCULAR; INTRAVENOUS; SUBCUTANEOUS
Status: COMPLETED | OUTPATIENT
Start: 2020-09-08 | End: 2020-09-08

## 2020-09-08 RX ORDER — HYOSCYAMINE SULFATE 0.12 MG/1
0.12 TABLET SUBLINGUAL
Status: COMPLETED | OUTPATIENT
Start: 2020-09-08 | End: 2020-09-08

## 2020-09-08 RX ORDER — IBUPROFEN 200 MG
24 TABLET ORAL
Status: DISCONTINUED | OUTPATIENT
Start: 2020-09-08 | End: 2020-09-09 | Stop reason: HOSPADM

## 2020-09-08 RX ORDER — POTASSIUM CHLORIDE 20 MEQ/1
40 TABLET, EXTENDED RELEASE ORAL ONCE
Status: COMPLETED | OUTPATIENT
Start: 2020-09-08 | End: 2020-09-08

## 2020-09-08 RX ORDER — PANTOPRAZOLE SODIUM 40 MG/1
40 TABLET, DELAYED RELEASE ORAL 2 TIMES DAILY
Status: DISCONTINUED | OUTPATIENT
Start: 2020-09-08 | End: 2020-09-09 | Stop reason: HOSPADM

## 2020-09-08 RX ORDER — IBUPROFEN 200 MG
16 TABLET ORAL
Status: DISCONTINUED | OUTPATIENT
Start: 2020-09-08 | End: 2020-09-09 | Stop reason: HOSPADM

## 2020-09-08 RX ORDER — HYDROCODONE BITARTRATE AND ACETAMINOPHEN 5; 325 MG/1; MG/1
1 TABLET ORAL EVERY 6 HOURS PRN
Status: DISCONTINUED | OUTPATIENT
Start: 2020-09-08 | End: 2020-09-09 | Stop reason: HOSPADM

## 2020-09-08 RX ORDER — ACETAMINOPHEN 325 MG/1
650 TABLET ORAL EVERY 4 HOURS PRN
Status: DISCONTINUED | OUTPATIENT
Start: 2020-09-08 | End: 2020-09-09 | Stop reason: HOSPADM

## 2020-09-08 RX ORDER — TALC
9 POWDER (GRAM) TOPICAL NIGHTLY PRN
Status: DISCONTINUED | OUTPATIENT
Start: 2020-09-08 | End: 2020-09-09 | Stop reason: HOSPADM

## 2020-09-08 RX ORDER — SODIUM CHLORIDE 9 MG/ML
INJECTION, SOLUTION INTRAVENOUS CONTINUOUS
Status: ACTIVE | OUTPATIENT
Start: 2020-09-08 | End: 2020-09-08

## 2020-09-08 RX ORDER — ONDANSETRON 2 MG/ML
4 INJECTION INTRAMUSCULAR; INTRAVENOUS
Status: COMPLETED | OUTPATIENT
Start: 2020-09-08 | End: 2020-09-08

## 2020-09-08 RX ORDER — SODIUM CHLORIDE 0.9 % (FLUSH) 0.9 %
10 SYRINGE (ML) INJECTION EVERY 6 HOURS PRN
Status: DISCONTINUED | OUTPATIENT
Start: 2020-09-08 | End: 2020-09-09 | Stop reason: HOSPADM

## 2020-09-08 RX ORDER — CARVEDILOL 12.5 MG/1
12.5 TABLET ORAL 2 TIMES DAILY WITH MEALS
Status: DISCONTINUED | OUTPATIENT
Start: 2020-09-08 | End: 2020-09-09 | Stop reason: HOSPADM

## 2020-09-08 RX ORDER — ONDANSETRON 4 MG/1
8 TABLET, ORALLY DISINTEGRATING ORAL EVERY 8 HOURS PRN
Status: DISCONTINUED | OUTPATIENT
Start: 2020-09-08 | End: 2020-09-09 | Stop reason: HOSPADM

## 2020-09-08 RX ORDER — ONDANSETRON 2 MG/ML
4 INJECTION INTRAMUSCULAR; INTRAVENOUS EVERY 6 HOURS PRN
Status: DISCONTINUED | OUTPATIENT
Start: 2020-09-08 | End: 2020-09-09 | Stop reason: HOSPADM

## 2020-09-08 RX ORDER — METRONIDAZOLE 500 MG/100ML
500 INJECTION, SOLUTION INTRAVENOUS
Status: COMPLETED | OUTPATIENT
Start: 2020-09-08 | End: 2020-09-08

## 2020-09-08 RX ADMIN — CARVEDILOL 12.5 MG: 12.5 TABLET, FILM COATED ORAL at 11:09

## 2020-09-08 RX ADMIN — HYDROMORPHONE HYDROCHLORIDE 0.25 MG: 1 INJECTION, SOLUTION INTRAMUSCULAR; INTRAVENOUS; SUBCUTANEOUS at 09:09

## 2020-09-08 RX ADMIN — POLYETHYLENE GLYCOL 3350 17 G: 17 POWDER, FOR SOLUTION ORAL at 09:09

## 2020-09-08 RX ADMIN — PANTOPRAZOLE SODIUM 40 MG: 40 TABLET, DELAYED RELEASE ORAL at 05:09

## 2020-09-08 RX ADMIN — HYDROMORPHONE HYDROCHLORIDE 0.25 MG: 1 INJECTION, SOLUTION INTRAMUSCULAR; INTRAVENOUS; SUBCUTANEOUS at 01:09

## 2020-09-08 RX ADMIN — HYDROMORPHONE HYDROCHLORIDE 0.25 MG: 1 INJECTION, SOLUTION INTRAMUSCULAR; INTRAVENOUS; SUBCUTANEOUS at 05:09

## 2020-09-08 RX ADMIN — AMLODIPINE BESYLATE 10 MG: 5 TABLET ORAL at 11:09

## 2020-09-08 RX ADMIN — ONDANSETRON 4 MG: 2 INJECTION INTRAMUSCULAR; INTRAVENOUS at 05:09

## 2020-09-08 RX ADMIN — MELATONIN 9 MG: at 09:09

## 2020-09-08 RX ADMIN — ONDANSETRON 4 MG: 2 INJECTION INTRAMUSCULAR; INTRAVENOUS at 09:09

## 2020-09-08 RX ADMIN — METRONIDAZOLE 500 MG: 500 INJECTION, SOLUTION INTRAVENOUS at 09:09

## 2020-09-08 RX ADMIN — HYOSCYAMINE SULFATE 0.12 MG: 0.12 TABLET ORAL at 05:09

## 2020-09-08 RX ADMIN — PANTOPRAZOLE SODIUM 40 MG: 40 TABLET, DELAYED RELEASE ORAL at 11:09

## 2020-09-08 RX ADMIN — CEFTRIAXONE 1 G: 1 INJECTION, SOLUTION INTRAVENOUS at 08:09

## 2020-09-08 RX ADMIN — CARVEDILOL 12.5 MG: 12.5 TABLET, FILM COATED ORAL at 05:09

## 2020-09-08 RX ADMIN — HYDROCODONE BITARTRATE AND ACETAMINOPHEN 1 TABLET: 5; 325 TABLET ORAL at 11:09

## 2020-09-08 RX ADMIN — POTASSIUM CHLORIDE 40 MEQ: 20 TABLET, EXTENDED RELEASE ORAL at 11:09

## 2020-09-08 RX ADMIN — HYDROMORPHONE HYDROCHLORIDE 0.25 MG: 1 INJECTION, SOLUTION INTRAMUSCULAR; INTRAVENOUS; SUBCUTANEOUS at 08:09

## 2020-09-08 RX ADMIN — IOHEXOL 100 ML: 350 INJECTION, SOLUTION INTRAVENOUS at 06:09

## 2020-09-08 RX ADMIN — LEVOFLOXACIN 500 MG: 500 INJECTION, SOLUTION INTRAVENOUS at 11:09

## 2020-09-08 RX ADMIN — SODIUM CHLORIDE: 0.9 INJECTION, SOLUTION INTRAVENOUS at 11:09

## 2020-09-08 RX ADMIN — SODIUM CHLORIDE 1000 ML: 0.9 INJECTION, SOLUTION INTRAVENOUS at 05:09

## 2020-09-08 NOTE — CONSULTS
"GASTROENTEROLOGY INPATIENT CONSULT NOTE  Patient Name: Irvin Diaz  Patient MRN: [id number]  Patient : 1975    Admit Date: 2020  Service date: 2020    Reason for Consult: abd pain    PCP: [facility]    Chief Complaint   Patient presents with    Abdominal Pain    Vomiting       HPI: Patient is a 44 y.o. male with PMHx HTN, PUD, history of right colon resection due to benign mass in , diverticulitis, PE  (off anticogulation) cirrhosis / EtOH abuse (none since ), THC abuse, diverticulitis w/ past contained perforation, CKD  that presented for evaluation of mild abd pain. Acute / subacute, intermittent, slight progression over past weeks. Seen at various hospitals and taking abx for "colitis". Pain located just above umbilicus and slightly worse w/ PO. No signs of bleeding, f/c or other issues. No EtOH recently but smoking 2-3 marijuana cigars daily. Mild constipation w/o BM in 2-3 days.has appt later this week w/ GI for endoscopy.     CHART REVIEW:   CT Abd  - nodular liver w/ portal HTN; normal panc / biliary; tics; R colon surgery; resolved L colon thickening (CTA earlier this week negative)  EGD  - bleeding varices s/p EVL x 4; PHG  RUQ 3/'19 - nodular liver; normal GB / CBD 4mm;   Colon  - Diverticulosis    Past Medical History:  Past Medical History:   Diagnosis Date    Alcoholic cirrhosis of liver     Arthritis     ATN (acute tubular necrosis)     Diverticulitis 2020    GI bleed     Hip arthritis     Left    Hypertension     Macrocytic anemia     Pulmonary embolism 2018    Unprovoked DVT.  Stop Coumadin due to GIB    Thrombocytopenia         Past Surgical History:  Past Surgical History:   Procedure Laterality Date    ANKLE SURGERY      COLON SURGERY  2007    [facility]    ESOPHAGOGASTRODUODENOSCOPY N/A 2019    Procedure: EGD (ESOPHAGOGASTRODUODENOSCOPY);  Surgeon: Brian Trivedi MD;  Location: " [facility];  Service: Endoscopy;  Laterality: N/A;    ESOPHAGOGASTRODUODENOSCOPY N/A 2020    Procedure: EGD (ESOPHAGOGASTRODUODENOSCOPY);  Surgeon: Donn Acuna MD;  Location: [facility];  Service: Endoscopy;  Laterality: N/A;    HERNIA REPAIR Left     Inguinal        Home Medications:  Medications Prior to Admission   Medication Sig Dispense Refill Last Dose    amLODIPine (NORVASC) 5 MG tablet Take 2 tablets (10 mg total) by mouth once daily.       carvediloL (COREG) 6.25 MG tablet Take 12.5 mg by mouth 2 (two) times daily with meals.        LISINOPRIL ORAL Take by mouth.       metroNIDAZOLE (FLAGYL) 500 MG tablet Take 1 tablet (500 mg total) by mouth 3 (three) times daily. for 7 days 21 tablet 0     ondansetron (ZOFRAN ODT) 4 MG TbDL Take 1 tablet (4 mg total) by mouth every 8 (eight) hours as needed (Nausea and Vomiting). 12 tablet 0     oxyCODONE-acetaminophen (PERCOCET) 5-325 mg per tablet Take 1 tablet by mouth every 6 (six) hours as needed for Pain. 8 tablet 0     pantoprazole (PROTONIX) 40 MG tablet Take 1 tablet (40 mg total) by mouth 2 (two) times daily. 180 tablet 0     sulfamethoxazole-trimethoprim 800-160mg (BACTRIM DS) 800-160 mg Tab Take 1 tablet by mouth 2 (two) times daily. for 7 days 14 tablet 0        Inpatient Medications:   amLODIPine  10 mg Oral Daily    carvediloL  12.5 mg Oral BID WM    levoFLOXacin  500 mg Intravenous Q24H    pantoprazole  40 mg Oral BID     acetaminophen, dextrose 50%, dextrose 50%, glucagon (human recombinant), glucose, glucose, HYDROcodone-acetaminophen, melatonin, ondansetron, ondansetron, sodium chloride 0.9%    Review of patient's allergies indicates:   Allergen Reactions    Ciprofloxacin hcl Hallucinations    Morphine Itching       Social History:   Social History     Occupational History    Not on file   Tobacco Use    Smoking status: Former Smoker     Quit date:      Years since quittin.7    Smokeless tobacco: Never Used     Tobacco comment: quit 20 years ago   Substance and Sexual Activity    Alcohol use: Not Currently     Comment: freq    Drug use: Not Currently     Types: Marijuana    Sexual activity: Yes     Comment: occ       Family History:   Family History   Problem Relation Age of Onset    Hypertension Mother     Breast cancer Mother     Hypertension Father     Prostate cancer Father     Bladder Cancer Father        Review of Systems:  A 10 point review of systems was performed and was normal, except as mentioned in the HPI, including constitutional, HEENT, heme, lymph, cardiovascular, respiratory, gastrointestinal, genitourinary, neurologic, endocrine, psychiatric and musculoskeletal.      OBJECTIVE:    Physical Exam:  24 Hour Vital Sign Ranges: Temp:  [98.3 °F (36.8 °C)-99.4 °F (37.4 °C)] 98.5 °F (36.9 °C)  Pulse:  [71-92] 79  Resp:  [14-39] 18  SpO2:  [94 %-98 %] 97 %  BP: (116-157)/(58-84) 157/84  Most recent vitals: BP (!) 157/84   Pulse 79   Temp 98.5 °F (36.9 °C) (Oral)   Resp 18   Ht 6' (1.829 m)   Wt 86.4 kg (190 lb 7.6 oz)   SpO2 97%   BMI 25.83 kg/m²    GEN: well-developed, well-nourished, awake and alert, non-toxic appearing adult  HEENT: PERRL, sclera anicteric, oral mucosa pink and moist without lesion  NECK: trachea midline; Good ROM  CV: regular rate and rhythm, no murmurs or gallops  RESP: clear to auscultation bilaterally, no wheezes, rhonci or rales  ABD: soft, min-tender, non-distended, normal bowel sounds  EXT: no swelling or edema, 2+ pulses distally  SKIN: no rashes or jaundice  PSYCH: normal affect    Labs:   Recent Labs     09/06/20  1350 09/08/20  0502   WBC 5.54 7.68   * 104*   * 116*     Recent Labs     09/06/20  1350 09/08/20  0502    137   K 3.4* 3.1*   CL 99 96   CO2 27 25   BUN 12 12    133*     No results for input(s): ALB in the last 72 hours.    Invalid input(s): ALKP, SGOT, SGPT, TBIL, DBIL, TPRO  No results for input(s): PT, INR, PTT in the last 72  hours.      Radiology Review:  CT Abdomen Pelvis With Contrast   Final Result            IMPRESSION / RECOMMENDATIONS:  44 y.o. male with PMHx HTN, PUD, history of right colon resection due to benign mass in 2008, diverticulitis, PE '18 (off anticogulation) cirrhosis / EtOH abuse (none since '19), THC abuse, diverticulitis w/ past contained perforation, CKD  that presented for evaluation of mild abd pain w/o signs of bleeding or obvious alarm sx. Recent imaging negative and no obvious concerns.     -Stop abx as colitis improved and may cause stomach issues  -Decrease marijuana use flora if liver transplant considered  -Avoid NSAIDs / EtOH indefinitely  -Miralax to see if improvement w/ BM   -F/u GI later this week for scheduled endoscopy    Thank you for this consult.    Levi Cope III  9/8/2020  1:16 PM

## 2020-09-08 NOTE — H&P
[facility] Medicine History & Physical Examination   Patient Name: Irvin Diaz  MRN: [id number]  Patient Class: OP- Observation   Admission Date: 9/8/2020  4:19 AM  Length of Stay: 0  Attending Physician: Jeff Marquez MD  Primary Care Provider: [facility]  Face-to-Face encounter date: 09/08/2020  Code Status: Full code  MPOA: Elan Good  Chief Complaint: Abdominal Pain and Vomiting        Patient information was obtained from patient, past medical records and ER records.   HISTORY OF PRESENT ILLNESS:   Irvin Diaz is a 44 y.o. Black or  male who  has a past medical history of Alcoholic cirrhosis of liver, Arthritis, ATN (acute tubular necrosis), Diverticulitis (01/2020), GI bleed, Hip arthritis, Hypertension, Macrocytic anemia, Pulmonary embolism (08/2018), and Thrombocytopenia.. The patient presented to [facility] on 9/8/2020 with a primary complaint of Abdominal Pain and Vomiting      History was obtained from the patient,  and ER physician Sign-out. Patient had multiple visits to different ERs in the last week and now presented again with the same complaint of abdominal pain x 7 days associated with nausea and vomiting. He had three CT scans which showed mild inflammation of the colon and subsequent CTs showed improving inflammation however his symptoms continued to worsen. He is unable to keep food and antibiotics down. Only thing he can tolerate is liquids. Initially his pain was episodic but now it is continuous and intensifies with food. He does have history of alcoholic cirrhosis with several visits to hospital for Upper GI bleeding requiring banding. He is currently being evaluated for Liver transplant at [facility].     In the emergency room, patient CBC was unremarkable. CMP showed potassium of 3.1, Creatinine of 1.5 and bilirubin of 4.3 and abnormal ALT and AST. Lactic acid was elevated. Reviewed  CT scan of the abdomen and agree with the findings.     Decision to admit was taken and patient was informed about the plan of care.   REVIEW OF SYSTEMS:   10 Point Review of System was performed and was found to be negative except for that mentioned already in the HPI above.     PAST MEDICAL HISTORY:     Past Medical History:   Diagnosis Date    Alcoholic cirrhosis of liver     Arthritis     ATN (acute tubular necrosis)     Diverticulitis 2020    GI bleed     Hip arthritis     Left    Hypertension     Macrocytic anemia     Pulmonary embolism 2018    Unprovoked DVT.  Stop Coumadin due to GIB    Thrombocytopenia        PAST SURGICAL HISTORY:     Past Surgical History:   Procedure Laterality Date    ANKLE SURGERY      COLON SURGERY      [facility]  2019    [facility]    ESOPHAGOGASTRODUODENOSCOPY N/A 2019    Procedure: EGD (ESOPHAGOGASTRODUODENOSCOPY);  Surgeon: Brian Trivedi MD;  Location: [facility];  Service: Endoscopy;  Laterality: N/A;    ESOPHAGOGASTRODUODENOSCOPY N/A 2020    Procedure: EGD (ESOPHAGOGASTRODUODENOSCOPY);  Surgeon: Donn Acuna MD;  Location: [facility];  Service: Endoscopy;  Laterality: N/A;    HERNIA REPAIR Left     Inguinal       ALLERGIES:   Ciprofloxacin hcl and Morphine    FAMILY HISTORY:     Family History   Problem Relation Age of Onset    Hypertension Mother     Breast cancer Mother     Hypertension Father     Prostate cancer Father     Bladder Cancer Father        SOCIAL HISTORY:     Social History     Tobacco Use    Smoking status: Former Smoker     Quit date:      Years since quittin.7    Smokeless tobacco: Never Used    Tobacco comment: quit 20 years ago   Substance Use Topics    Alcohol use: Not Currently     Comment: freq        Social History     Substance and Sexual Activity   Sexual Activity Yes    Comment: occ        HOME MEDICATIONS:     Prior to Admission medications    Medication Sig Start Date End Date Taking?  Authorizing Provider   amLODIPine (NORVASC) 5 MG tablet Take 2 tablets (10 mg total) by mouth once daily. 4/11/20 4/11/21  Ayah Whitney MD   carvediloL (COREG) 6.25 MG tablet Take 12.5 mg by mouth 2 (two) times daily with meals.     Historical Provider, MD   LISINOPRIL ORAL Take by mouth.    Historical Provider, MD   metroNIDAZOLE (FLAGYL) 500 MG tablet Take 1 tablet (500 mg total) by mouth 3 (three) times daily. for 7 days 9/3/20 9/10/20  Edouard Haji MD   ondansetron (ZOFRAN ODT) 4 MG TbDL Take 1 tablet (4 mg total) by mouth every 8 (eight) hours as needed (Nausea and Vomiting). 9/3/20   Edouard Haji MD   oxyCODONE-acetaminophen (PERCOCET) 5-325 mg per tablet Take 1 tablet by mouth every 6 (six) hours as needed for Pain. 9/3/20   Edouard Haji MD   pantoprazole (PROTONIX) 40 MG tablet Take 1 tablet (40 mg total) by mouth 2 (two) times daily. 4/11/20   Ayah Whitney MD   potassium chloride SA (K-DUR,KLOR-CON) 10 MEQ tablet Take 10 mEq by mouth once daily.    Historical Provider, MD   sulfamethoxazole-trimethoprim 800-160mg (BACTRIM DS) 800-160 mg Tab Take 1 tablet by mouth 2 (two) times daily. for 7 days 9/3/20 9/10/20  Edouard Haji MD         PHYSICAL EXAM:   /80   Pulse 77   Temp 98.3 °F (36.8 °C) (Oral)   Resp 17   Ht 6' (1.829 m)   Wt 86.2 kg (190 lb 0.6 oz)   SpO2 97%   BMI 25.77 kg/m²   Vitals Reviewed  General appearance: Black or  male in no apparent distress.  Skin: No Rash.   Neuro: Motor and sensory exams grossly intact. Good tone. Power in all 4 extremities 5/5.   HENT: Atraumatic head. Moist mucous membranes of oral cavity.  Eyes: Normal extraocular movements.   Neck: Supple. No evidence of lymphadenopathy. No thyroidomegaly.  Lungs: Clear to auscultation bilaterally. No wheezing present.   Heart: Regular rate and rhythm. S1 and S2 present with no murmurs/gallop/rub. No pedal edema. No JVD present.   Abdomen: Soft, non-distended, Mild  tenderness. No rebound tenderness/guarding. No masses or organomegaly. Bowel sounds are normal. Bladder is not palpable.   Extremities: No cyanosis, clubbing.  Psych/mental status: Alert and oriented. Cooperative. Responds appropriately to questions.   EMERGENCY DEPARTMENT LABS AND IMAGING:     Labs Reviewed   CBC W/ AUTO DIFFERENTIAL - Abnormal; Notable for the following components:       Result Value    RBC 3.55 (*)     Hemoglobin 12.5 (*)     Hematocrit 36.9 (*)     Mean Corpuscular Volume 104 (*)     Mean Corpuscular Hemoglobin 35.2 (*)     RDW 15.8 (*)     Platelets 116 (*)     All other components within normal limits   COMPREHENSIVE METABOLIC PANEL - Abnormal; Notable for the following components:    Potassium 3.1 (*)     Glucose 133 (*)     Creatinine 1.5 (*)     Total Protein 9.0 (*)     Total Bilirubin 4.3 (*)     AST 93 (*)     ALT 50 (*)     eGFR if non  55.4 (*)     All other components within normal limits   LACTIC ACID, PLASMA - Abnormal; Notable for the following components:    Lactate (Lactic Acid) 2.2 (*)     All other components within normal limits    Narrative:       Lactid acid critical result(s) called and verbal readback obtained                   from Nestor Schroeder RN ER by MS1 09/08/2020 05:54   URINALYSIS, REFLEX TO URINE CULTURE - Abnormal; Notable for the following components:    Appearance, UA Hazy (*)     Protein, UA 1+ (*)     Urobilinogen, UA >=8.0 (*)     All other components within normal limits    Narrative:     Specimen Source->Urine   URINALYSIS MICROSCOPIC - Abnormal; Notable for the following components:    Hyaline Casts, UA 7 (*)     All other components within normal limits    Narrative:     Specimen Source->Urine   LIPASE   DRUG SCREEN PANEL, URINE EMERGENCY    Narrative:     Specimen Source->Urine   ALCOHOL,MEDICAL (ETHANOL)   SARS-COV-2 RNA AMPLIFICATION, QUAL       CT Abdomen Pelvis With Contrast   Final Result          ASSESSMENT & PLAN:   Irvin WOOD  Joe is a 44 y.o. male admitted for    Active Problems addressed during this visit  Nausea and Vomiting  Mild Colitis    Chronic Problems  Alcoholic Cirrhosis  Macrocytic Anemia  Thrombocytopenia  Hypokalemia  Hyperbilirubinemia  Elevated Alt/Ast  Lactic Acidosis  Chronic Kidney disease  H/o Esophageal varices with banding    Plan  Admit to Med/Surg  Diet advance as tolerated  IV Antiemetics  IV Ciprofloxacin  C/s GI    DVT Prophylaxis: will be placed on SCD for DVT prophylaxis and will be advised to be as mobile as possible and sit in a chair as tolerated.   ________________________________________________________________________________    Discharge Planning and Disposition: No mobility needs. Ambulating well. Patient will be discharged in 1-2 days  Admitted as observation   Face-to-Face encounter date: 09/08/2020  Encounter included review of the medical records, interviewing and examining the patient face-to-face, discussion with family and other health care providers including emergency medicine physician, admission orders, interpreting lab/test results and formulating a plan of care.   Medical Decision Making during this encounter was  [_] Low Complexity  [_] Moderate Complexity  [x] High Complexity  _________________________________________________________________________________    INPATIENT LIST OF MEDICATIONS     Current Facility-Administered Medications:     0.9%  NaCl infusion, , Intravenous, Continuous, Jeff Marquez MD    ciprofloxacin (CIPRO)400mg/200ml D5W IVPB 400 mg, 400 mg, Intravenous, Q12H, Jeff Marquez MD    metronidazole IVPB 500 mg, 500 mg, Intravenous, ED 1 Time, Rosita Salcedo MD    ondansetron injection 4 mg, 4 mg, Intravenous, Q6H PRN, Jeff Marquez MD    Current Outpatient Medications:     amLODIPine (NORVASC) 5 MG tablet, Take 2 tablets (10 mg total) by mouth once daily., Disp: , Rfl:     carvediloL (COREG) 6.25 MG tablet, Take 12.5 mg by mouth 2 (two)  times daily with meals. , Disp: , Rfl:     LISINOPRIL ORAL, Take by mouth., Disp: , Rfl:     metroNIDAZOLE (FLAGYL) 500 MG tablet, Take 1 tablet (500 mg total) by mouth 3 (three) times daily. for 7 days, Disp: 21 tablet, Rfl: 0    ondansetron (ZOFRAN ODT) 4 MG TbDL, Take 1 tablet (4 mg total) by mouth every 8 (eight) hours as needed (Nausea and Vomiting)., Disp: 12 tablet, Rfl: 0    oxyCODONE-acetaminophen (PERCOCET) 5-325 mg per tablet, Take 1 tablet by mouth every 6 (six) hours as needed for Pain., Disp: 8 tablet, Rfl: 0    pantoprazole (PROTONIX) 40 MG tablet, Take 1 tablet (40 mg total) by mouth 2 (two) times daily., Disp: 180 tablet, Rfl: 0    potassium chloride SA (K-DUR,KLOR-CON) 10 MEQ tablet, Take 10 mEq by mouth once daily., Disp: , Rfl:     sulfamethoxazole-trimethoprim 800-160mg (BACTRIM DS) 800-160 mg Tab, Take 1 tablet by mouth 2 (two) times daily. for 7 days, Disp: 14 tablet, Rfl: 0      Scheduled Meds:   ciprofloxacin  400 mg Intravenous Q12H    metronidazole  500 mg Intravenous ED 1 Time     Continuous Infusions:   sodium chloride 0.9%       PRN Meds:.ondansetron      Jeff Marquez  [facility] Hospitalist  09/08/2020

## 2020-09-08 NOTE — PROGRESS NOTES
The medication you have ordered is an automatic therapeutic interchange that has been approved by the appropriate committees.  Your order for CIPRO (ciprofloxacin) 400 mg IV every 12 hours has been changed to LEVAQUIN (levofloxacin) 500 mg IV every 24 hours as per the approved dosing conversion table.

## 2020-09-08 NOTE — ED PROVIDER NOTES
Encounter Date: 9/8/2020       History     Chief Complaint   Patient presents with    Abdominal Pain    Vomiting     Chief complaint is abdominal pain and nausea vomiting.  The patient has a history of alcoholic cirrhosis, GI bleed.  He recently was diagnosed on September 3rd with possible diverticulitis.  He was put on antibiotics.  On September 3rd he was seen at [facility].  He was again seen at [facility] on September 6.  He had a 2nd CT scan which was unchanged.  He was discharged on antibiotics as well.  He now is in [place] and has nausea vomiting and can not keep his antibiotics down and is now here for evaluation.  He is also requesting something for pain.        Review of patient's allergies indicates:   Allergen Reactions    Ciprofloxacin hcl Hallucinations    Morphine Itching     Past Medical History:   Diagnosis Date    Alcoholic cirrhosis of liver     Arthritis     ATN (acute tubular necrosis)     Diverticulitis 01/2020    GI bleed     Hip arthritis     Left    Hypertension     Macrocytic anemia     Pulmonary embolism 08/2018    Unprovoked DVT.  Stop Coumadin due to GIB    Thrombocytopenia      Past Surgical History:   Procedure Laterality Date    ANKLE SURGERY      COLON SURGERY  2007    [facility]    ESOPHAGOGASTRODUODENOSCOPY N/A 7/26/2019    Procedure: EGD (ESOPHAGOGASTRODUODENOSCOPY);  Surgeon: Brian Trivedi MD;  Location: [facility];  Service: Endoscopy;  Laterality: N/A;    ESOPHAGOGASTRODUODENOSCOPY N/A 4/8/2020    Procedure: EGD (ESOPHAGOGASTRODUODENOSCOPY);  Surgeon: Donn Acuna MD;  Location: [facility];  Service: Endoscopy;  Laterality: N/A;    HERNIA REPAIR Left     Inguinal     Family History   Problem Relation Age of Onset    Hypertension Mother     Breast cancer Mother     Hypertension Father     Prostate cancer Father     Bladder Cancer Father      Social History     Tobacco Use    Smoking status: Former Smoker     Quit  date:      Years since quittin.    Smokeless tobacco: Never Used    Tobacco comment: quit 20 years ago   Substance Use Topics    Alcohol use: Not Currently     Comment: freq    Drug use: Not Currently     Types: Marijuana     Review of Systems   Constitutional: Negative for chills and fever.   HENT: Negative for ear pain, rhinorrhea and sore throat.    Eyes: Negative for pain and visual disturbance.   Respiratory: Negative for cough and shortness of breath.    Cardiovascular: Negative for chest pain and palpitations.   Gastrointestinal: Positive for abdominal pain, nausea and vomiting. Negative for constipation and diarrhea.   Genitourinary: Negative for dysuria, frequency, hematuria and urgency.   Musculoskeletal: Negative for back pain, joint swelling and myalgias.   Skin: Negative for rash.   Neurological: Negative for dizziness, seizures, weakness and headaches.   Psychiatric/Behavioral: Negative for dysphoric mood. The patient is not nervous/anxious.        Physical Exam     Initial Vitals [20 0417]   BP Pulse Resp Temp SpO2   (!) 147/83 92 16 99.4 °F (37.4 °C) 98 %      MAP       --         Physical Exam    Nursing note and vitals reviewed.  Constitutional: He appears well-developed and well-nourished.   HENT:   Head: Normocephalic and atraumatic.   Eyes: Conjunctivae, EOM and lids are normal. Pupils are equal, round, and reactive to light.   Neck: Trachea normal. Neck supple. No thyroid mass present.   Cardiovascular: Normal rate, regular rhythm and normal heart sounds.   Pulmonary/Chest: Breath sounds normal. No respiratory distress.   Abdominal: Soft. Bowel sounds are normal. There is abdominal tenderness.   Patient tender palpation epigastric area periumbilical area no rebound good bowel sounds   Musculoskeletal: Normal range of motion.   Neurological: He is alert and oriented to person, place, and time. He has normal strength and normal reflexes. No cranial nerve deficit or sensory  deficit.   Skin: Skin is warm and dry.   Psychiatric: He has a normal mood and affect. His speech is normal and behavior is normal. Judgment and thought content normal.         ED Course   Procedures  Labs Reviewed - No data to display       Imaging Results    None          Medical Decision Making:   ED Management:  The case was discussed in detail with the hospitalist.  The patient will be evaluated and treated for severe abdominal pain nausea vomiting and the inability to take antibiotics for possible colitis. Rosita Salcedo MD  7:58 AM 09/08/2020                                       Clinical Impression:       ICD-10-CM ICD-9-CM   1. Nausea and vomiting, intractability of vomiting not specified, unspecified vomiting type  R11.2 787.01   2. Abdominal pain, unspecified abdominal location  R10.9 789.00   3. Colitis  K52.9 558.9                                Rosita Slacedo MD  09/08/20 0800

## 2020-09-08 NOTE — PLAN OF CARE
Patient stated that he lives at home with his wife and plans to return. Patient has had home health in the past [facility], he has an advance directive and his PCP is Dr. Snyder.        09/08/20 1215   Discharge Assessment   Assessment Type Discharge Planning Assessment   Confirmed/corrected address and phone number on facesheet? Yes   Assessment information obtained from? Patient   Prior to hospitilization cognitive status: Alert/Oriented   Prior to hospitalization functional status: Independent   Current cognitive status: Alert/Oriented   Current Functional Status: Independent   Lives With spouse   Able to Return to Prior Arrangements yes   Is patient able to care for self after discharge? Yes   Patient currently being followed by outpatient case management? No   Patient currently receives any other outside agency services? No   Equipment Currently Used at Home walker, rolling;cane, straight   Do you have any problems affording any of your prescribed medications? No   Is the patient taking medications as prescribed? yes   Does the patient have transportation home? Yes   Transportation Anticipated family or friend will provide   Dialysis Name and Scheduled days none   Does the patient receive services at the [facility]? No   Discharge Plan A Home   Discharge Plan B Home   DME Needed Upon Discharge  none   Patient/Family in Agreement with Plan yes

## 2020-09-08 NOTE — ED NOTES
" MASK IN PLACE.  PRIVATE ROOM. EVEN AND NON LABORED RESPIRATIONS.  AIRWAY CLEAR.  PULSES REGULAR.  < 3" CAPILLARY REFILL. SKIN WDI.  MAEW.  NON DISTENDED ABDOMEN.STATES ABDOMINAL PAIN 8/10. NO PRESENT NAUSEA. DENIES DIARRHEA.   ALERT, ORIENTED AND AMBULATORY WITH CANE.  CALL LIGHT IN REACH.  "

## 2020-09-09 VITALS
HEART RATE: 61 BPM | HEIGHT: 72 IN | WEIGHT: 190.5 LBS | BODY MASS INDEX: 25.8 KG/M2 | OXYGEN SATURATION: 97 % | SYSTOLIC BLOOD PRESSURE: 102 MMHG | DIASTOLIC BLOOD PRESSURE: 54 MMHG | RESPIRATION RATE: 16 BRPM | TEMPERATURE: 98 F

## 2020-09-09 LAB
ALBUMIN SERPL BCP-MCNC: 2.9 G/DL (ref 3.5–5.2)
ALP SERPL-CCNC: 76 U/L (ref 55–135)
ALT SERPL W/O P-5'-P-CCNC: 34 U/L (ref 10–44)
ANION GAP SERPL CALC-SCNC: 10 MMOL/L (ref 8–16)
AST SERPL-CCNC: 58 U/L (ref 10–40)
BASOPHILS # BLD AUTO: 0.04 K/UL (ref 0–0.2)
BASOPHILS NFR BLD: 0.7 % (ref 0–1.9)
BILIRUB SERPL-MCNC: 3.9 MG/DL (ref 0.1–1)
BUN SERPL-MCNC: 13 MG/DL (ref 6–20)
CALCIUM SERPL-MCNC: 8.7 MG/DL (ref 8.7–10.5)
CHLORIDE SERPL-SCNC: 99 MMOL/L (ref 95–110)
CHOLEST SERPL-MCNC: 175 MG/DL (ref 120–199)
CHOLEST/HDLC SERPL: 6.7 {RATIO} (ref 2–5)
CO2 SERPL-SCNC: 28 MMOL/L (ref 23–29)
CREAT SERPL-MCNC: 1.5 MG/DL (ref 0.5–1.4)
DIFFERENTIAL METHOD: ABNORMAL
EOSINOPHIL # BLD AUTO: 0.1 K/UL (ref 0–0.5)
EOSINOPHIL NFR BLD: 1.4 % (ref 0–8)
ERYTHROCYTE [DISTWIDTH] IN BLOOD BY AUTOMATED COUNT: 15.6 % (ref 11.5–14.5)
EST. GFR  (AFRICAN AMERICAN): >60 ML/MIN/1.73 M^2
EST. GFR  (NON AFRICAN AMERICAN): 55.4 ML/MIN/1.73 M^2
GLUCOSE SERPL-MCNC: 101 MG/DL (ref 70–110)
HCT VFR BLD AUTO: 30.9 % (ref 40–54)
HDLC SERPL-MCNC: 26 MG/DL (ref 40–75)
HDLC SERPL: 14.9 % (ref 20–50)
HGB BLD-MCNC: 10.3 G/DL (ref 14–18)
IMM GRANULOCYTES # BLD AUTO: 0.01 K/UL (ref 0–0.04)
IMM GRANULOCYTES NFR BLD AUTO: 0.2 % (ref 0–0.5)
LDLC SERPL CALC-MCNC: 136.2 MG/DL (ref 63–159)
LYMPHOCYTES # BLD AUTO: 2 K/UL (ref 1–4.8)
LYMPHOCYTES NFR BLD: 34.5 % (ref 18–48)
MAGNESIUM SERPL-MCNC: 1.9 MG/DL (ref 1.6–2.6)
MCH RBC QN AUTO: 34.6 PG (ref 27–31)
MCHC RBC AUTO-ENTMCNC: 33.3 G/DL (ref 32–36)
MCV RBC AUTO: 104 FL (ref 82–98)
MONOCYTES # BLD AUTO: 0.7 K/UL (ref 0.3–1)
MONOCYTES NFR BLD: 11.7 % (ref 4–15)
NEUTROPHILS # BLD AUTO: 3 K/UL (ref 1.8–7.7)
NEUTROPHILS NFR BLD: 51.5 % (ref 38–73)
NONHDLC SERPL-MCNC: 149 MG/DL
NRBC BLD-RTO: 0 /100 WBC
PLATELET # BLD AUTO: 84 K/UL (ref 150–350)
PMV BLD AUTO: 11.6 FL (ref 9.2–12.9)
POTASSIUM SERPL-SCNC: 3 MMOL/L (ref 3.5–5.1)
PROT SERPL-MCNC: 7 G/DL (ref 6–8.4)
RBC # BLD AUTO: 2.98 M/UL (ref 4.6–6.2)
SODIUM SERPL-SCNC: 137 MMOL/L (ref 136–145)
TRIGL SERPL-MCNC: 64 MG/DL (ref 30–150)
WBC # BLD AUTO: 5.74 K/UL (ref 3.9–12.7)

## 2020-09-09 PROCEDURE — 96376 TX/PRO/DX INJ SAME DRUG ADON: CPT

## 2020-09-09 PROCEDURE — 80061 LIPID PANEL: CPT

## 2020-09-09 PROCEDURE — 83036 HEMOGLOBIN GLYCOSYLATED A1C: CPT

## 2020-09-09 PROCEDURE — 85025 COMPLETE CBC W/AUTO DIFF WBC: CPT

## 2020-09-09 PROCEDURE — 83735 ASSAY OF MAGNESIUM: CPT

## 2020-09-09 PROCEDURE — G0378 HOSPITAL OBSERVATION PER HR: HCPCS

## 2020-09-09 PROCEDURE — 36415 COLL VENOUS BLD VENIPUNCTURE: CPT

## 2020-09-09 PROCEDURE — 80053 COMPREHEN METABOLIC PANEL: CPT

## 2020-09-09 PROCEDURE — 25000003 PHARM REV CODE 250: Performed by: INTERNAL MEDICINE

## 2020-09-09 PROCEDURE — 63600175 PHARM REV CODE 636 W HCPCS: Performed by: INTERNAL MEDICINE

## 2020-09-09 RX ORDER — POTASSIUM CHLORIDE 7.45 MG/ML
20 INJECTION INTRAVENOUS
Status: DISCONTINUED | OUTPATIENT
Start: 2020-09-09 | End: 2020-09-09 | Stop reason: HOSPADM

## 2020-09-09 RX ORDER — POTASSIUM CHLORIDE 20 MEQ/1
40 TABLET, EXTENDED RELEASE ORAL
Status: DISCONTINUED | OUTPATIENT
Start: 2020-09-09 | End: 2020-09-09 | Stop reason: HOSPADM

## 2020-09-09 RX ORDER — LANOLIN ALCOHOL/MO/W.PET/CERES
800 CREAM (GRAM) TOPICAL
Status: DISCONTINUED | OUTPATIENT
Start: 2020-09-09 | End: 2020-09-09 | Stop reason: HOSPADM

## 2020-09-09 RX ORDER — MAGNESIUM SULFATE HEPTAHYDRATE 40 MG/ML
2 INJECTION, SOLUTION INTRAVENOUS
Status: DISCONTINUED | OUTPATIENT
Start: 2020-09-09 | End: 2020-09-09 | Stop reason: HOSPADM

## 2020-09-09 RX ORDER — POTASSIUM CHLORIDE 20 MEQ/1
20 TABLET, EXTENDED RELEASE ORAL
Status: DISCONTINUED | OUTPATIENT
Start: 2020-09-09 | End: 2020-09-09 | Stop reason: HOSPADM

## 2020-09-09 RX ORDER — POTASSIUM CHLORIDE 7.45 MG/ML
40 INJECTION INTRAVENOUS
Status: DISCONTINUED | OUTPATIENT
Start: 2020-09-09 | End: 2020-09-09 | Stop reason: HOSPADM

## 2020-09-09 RX ORDER — MAGNESIUM SULFATE 1 G/100ML
1 INJECTION INTRAVENOUS
Status: DISCONTINUED | OUTPATIENT
Start: 2020-09-09 | End: 2020-09-09 | Stop reason: HOSPADM

## 2020-09-09 RX ORDER — POLYETHYLENE GLYCOL 3350 17 G/17G
17 POWDER, FOR SOLUTION ORAL 2 TIMES DAILY
Qty: 10 PACKET | Refills: 3 | Status: SHIPPED | OUTPATIENT
Start: 2020-09-09 | End: 2021-02-15

## 2020-09-09 RX ORDER — MAGNESIUM SULFATE HEPTAHYDRATE 40 MG/ML
4 INJECTION, SOLUTION INTRAVENOUS
Status: DISCONTINUED | OUTPATIENT
Start: 2020-09-09 | End: 2020-09-09 | Stop reason: HOSPADM

## 2020-09-09 RX ADMIN — HYDROMORPHONE HYDROCHLORIDE 0.25 MG: 1 INJECTION, SOLUTION INTRAMUSCULAR; INTRAVENOUS; SUBCUTANEOUS at 04:09

## 2020-09-09 RX ADMIN — POTASSIUM CHLORIDE 40 MEQ: 20 TABLET, EXTENDED RELEASE ORAL at 08:09

## 2020-09-09 RX ADMIN — POLYETHYLENE GLYCOL 3350 17 G: 17 POWDER, FOR SOLUTION ORAL at 08:09

## 2020-09-09 RX ADMIN — ONDANSETRON 4 MG: 2 INJECTION INTRAMUSCULAR; INTRAVENOUS at 03:09

## 2020-09-09 RX ADMIN — PANTOPRAZOLE SODIUM 40 MG: 40 TABLET, DELAYED RELEASE ORAL at 06:09

## 2020-09-09 NOTE — PLAN OF CARE
Problem: Fall Injury Risk  Goal: Absence of Fall and Fall-Related Injury  Outcome: Ongoing, Progressing     Problem: Adult Inpatient Plan of Care  Goal: Plan of Care Review  Outcome: Ongoing, Progressing  Goal: Patient-Specific Goal (Individualization)  Outcome: Ongoing, Progressing  Goal: Absence of Hospital-Acquired Illness or Injury  Outcome: Ongoing, Progressing  Goal: Optimal Comfort and Wellbeing  Outcome: Ongoing, Progressing  Goal: Readiness for Transition of Care  Outcome: Ongoing, Progressing  Goal: Rounds/Family Conference  Outcome: Ongoing, Progressing     Problem: Electrolyte Imbalance (Acute Kidney Injury/Impairment)  Goal: Serum Electrolyte Balance  Outcome: Ongoing, Progressing     Problem: Fluid Imbalance (Acute Kidney Injury/Impairment)  Goal: Optimal Fluid Balance  Outcome: Ongoing, Progressing     Problem: Hematologic Alteration (Acute Kidney Injury/Impairment)  Goal: Hemoglobin, Hematocrit and Platelets Within Normal Range  Outcome: Ongoing, Progressing     Problem: Oral Intake Inadequate (Acute Kidney Injury/Impairment)  Goal: Optimal Nutrition Intake  Outcome: Ongoing, Progressing     Problem: Renal Function Impairment (Acute Kidney Injury/Impairment)  Goal: Effective Renal Function  Outcome: Ongoing, Progressing

## 2020-09-09 NOTE — DISCHARGE SUMMARY
[facility]  Discharge Summary  Patient Name: Irvin Diaz MRN: [id number]   Patient Class: OP- Observation  Length of Stay: 0   Admission Date: 9/8/2020  4:19 AM Attending Physician: Jeff Marquez MD   Primary Care Provider: [facility] Face-to-Face encounter date: 09/09/2020   Chief Complaint: Abdominal Pain and Vomiting    Date of Discharge: 9/9/2020  Discharge Disposition: Home  Condition: Stable    Reason for Hospitalization   Primary Diagnosis: intractable nausea and vomiting    Secondary Diagnosis: Alcoholic Cirrhosis  Macrocytic Anemia  Thrombocytopenia  Hypokalemia  Hyperbilirubinemia  Elevated Alt/Ast  Lactic Acidosis  Chronic Kidney disease  H/o Esophageal varices with banding         Patient Active Problem List   Diagnosis    Coagulopathy    Thrombocytopenia    History of pulmonary embolus (PE)    Acute blood loss anemia    Secondary esophageal varices with bleeding    AVN (avascular necrosis of bone)    Hydronephrosis of right kidney    Transaminitis    ENA (acute kidney injury)    Essential hypertension    Alcoholic cirrhosis of liver    Macrocytic anemia    CKD (chronic kidney disease) stage 3, GFR 30-59 ml/min    Elevated troponin level    Hip arthritis    Nausea and vomiting       Brief History of Present Illness    Irvin Diaz is a 44 y.o.  male who  has a past medical history of Alcoholic cirrhosis of liver, Arthritis, ATN (acute tubular necrosis), Diverticulitis (01/2020), GI bleed, Hip arthritis, Hypertension, Macrocytic anemia, Pulmonary embolism (08/2018), and Thrombocytopenia.. The patient presented to [facility] on 9/8/2020 with a primary complaint of Abdominal Pain and Vomiting      For the full HPI please refer to the History & Physical from this admission.    Hospital Course By Problem with Pertinent Findings     This is a 44 year old male admitted for intractable nausea and vomiting. Patient CT scan  showed resoled inflammation. He received one days of antibiotics. Gastroenterology suggested to stop antibiotics. Patient significantly improved next day. He tolerated diet. Patient was discharged home in stable condition.       Physical Exam  BP (!) 91/49 (BP Location: Left arm, Patient Position: Lying)   Pulse (!) 57   Temp 98 °F (36.7 °C) (Oral)   Resp 14   Ht 6' (1.829 m)   Wt 86.4 kg (190 lb 7.6 oz)   SpO2 98%   BMI 25.83 kg/m²   Vitals reviewed.    Constitutional: No distress.   HENT: Atraumatic.   Cardiovascular: Normal rate, regular rhythm and normal heart sounds.   Pulmonary/Chest: Effort normal. Clear to auscultation bilaterally. No wheezes.   Abdominal: Soft. Bowel sounds are normal. Exhibits no distension and no mass. No tenderness  Neurological: Alert.   Skin: Skin is warm and dry.     Following labs were Reviewed   Recent Labs   Lab 09/09/20  0543 09/09/20  0544   WBC  --  5.74   HGB  --  10.3*   HCT  --  30.9*   PLT  --  84*   CALCIUM 8.7  --    ALBUMIN 2.9*  --    PROT 7.0  --      --    K 3.0*  --    CO2 28  --    CL 99  --    BUN 13  --    CREATININE 1.5*  --    ALKPHOS 76  --    ALT 34  --    AST 58*  --    BILITOT 3.9*  --      No results found for: POCTGLUCOSE     All labs within the past 24 hours have been reviewed    Microbiology Results (last 7 days)     ** No results found for the last 168 hours. **        CT Abdomen Pelvis With Contrast   Final Result          No results found for this or any previous visit.      Consultants and Procedures   Consultants:  Gastroenterology    Procedures:   none    Discharge Information:   Diet:  Resume regular diet    Physical Activity:  Activity as tolerated    Instructions:  1. Take all medications as prescribed  2. Keep all follow-up appointments  3. Return to the hospital or call your primary care physicians if any worsening symptoms such as chest pain, shortness of breath, abdominal pain occur.      Follow-Up Appointments:  1. Please call  your primary care physician to schedule an appointment in 1 week time.      Pending laboratory work/Tests to be performed/followed by the Primary care Physician: none    The patient was discharged in the care of her parents//wife/family/caregiver, with discharge instructions were reviewed in written and verbal form. All pertinent questions were discussed and prescriptions were provided. The importance of making follow up appointments and compliance of medications has been stressed repeatedly. The patient will follow up in 1 week or sooner as needed with the PCP, and the patient is on board with the plan. Upon discharge, patient needs to be on following medications.    Discharge Medications:     Medication List      START taking these medications    polyethylene glycol 17 gram Pwpk  Commonly known as: GLYCOLAX  Take 17 g by mouth 2 (two) times daily.        CONTINUE taking these medications    amLODIPine 5 MG tablet  Commonly known as: NORVASC  Take 2 tablets (10 mg total) by mouth once daily.     carvediloL 6.25 MG tablet  Commonly known as: COREG     ondansetron 4 MG Tbdl  Commonly known as: ZOFRAN ODT  Take 1 tablet (4 mg total) by mouth every 8 (eight) hours as needed (Nausea and Vomiting).     oxyCODONE-acetaminophen 5-325 mg per tablet  Commonly known as: PERCOCET  Take 1 tablet by mouth every 6 (six) hours as needed for Pain.     pantoprazole 40 MG tablet  Commonly known as: PROTONIX  Take 1 tablet (40 mg total) by mouth 2 (two) times daily.        STOP taking these medications    LISINOPRIL ORAL     metroNIDAZOLE 500 MG tablet  Commonly known as: FLAGYL     sulfamethoxazole-trimethoprim 800-160mg 800-160 mg Tab  Commonly known as: BACTRIM DS           Where to Get Your Medications      These medications were sent to [id number] - [address] AT [facility] & [address] [address]    Hours: 24-hours Phone: [phone]   · polyethylene glycol  17 gram Pwpk           I spent 30 minutes preparing the discharge including reviewing records from previous encounters, preparation of discharge summary, assessing and final examination of the patient, discharge medicine reconciliation, discussing plan of care, follow up and education and prescriptions.       Jeff Marquez  [facility] Hospitalist  09/09/2020

## 2020-09-09 NOTE — PLAN OF CARE
09/09/20 1019   Final Note   Assessment Type Final Discharge Note   Anticipated Discharge Disposition Home   No needs at this time.

## 2020-09-10 LAB
ESTIMATED AVG GLUCOSE: 97 MG/DL (ref 68–131)
HBA1C MFR BLD HPLC: 5 % (ref 4.5–6.2)

## 2020-10-18 ENCOUNTER — HOSPITAL ENCOUNTER (INPATIENT)
Facility: HOSPITAL | Age: 45
LOS: 3 days | Discharge: HOME | DRG: 552 | End: 2020-10-21
Attending: EMERGENCY MEDICINE | Admitting: INTERNAL MEDICINE
Payer: MEDICAID

## 2020-10-18 DIAGNOSIS — R00.0 TACHYCARDIA: ICD-10-CM

## 2020-10-18 DIAGNOSIS — M79.603 ARM PAIN: ICD-10-CM

## 2020-10-18 DIAGNOSIS — R07.9 CHEST PAIN: ICD-10-CM

## 2020-10-18 LAB
ALBUMIN SERPL BCP-MCNC: 3.7 G/DL (ref 3.5–5.2)
ALP SERPL-CCNC: 99 U/L (ref 55–135)
ALT SERPL W/O P-5'-P-CCNC: 59 U/L (ref 10–44)
ANION GAP SERPL CALC-SCNC: 18 MMOL/L (ref 8–16)
AST SERPL-CCNC: 113 U/L (ref 10–40)
BASOPHILS # BLD AUTO: 0.04 K/UL (ref 0–0.2)
BASOPHILS NFR BLD: 0.6 % (ref 0–1.9)
BILIRUB SERPL-MCNC: 4.1 MG/DL (ref 0.1–1)
BNP SERPL-MCNC: 33 PG/ML (ref 0–99)
BUN SERPL-MCNC: 14 MG/DL (ref 6–20)
CALCIUM SERPL-MCNC: 8.8 MG/DL (ref 8.7–10.5)
CHLORIDE SERPL-SCNC: 100 MMOL/L (ref 95–110)
CO2 SERPL-SCNC: 19 MMOL/L (ref 23–29)
CREAT SERPL-MCNC: 1.2 MG/DL (ref 0.5–1.4)
DIFFERENTIAL METHOD: ABNORMAL
EOSINOPHIL # BLD AUTO: 0.1 K/UL (ref 0–0.5)
EOSINOPHIL NFR BLD: 0.7 % (ref 0–8)
ERYTHROCYTE [DISTWIDTH] IN BLOOD BY AUTOMATED COUNT: 15.4 % (ref 11.5–14.5)
EST. GFR  (AFRICAN AMERICAN): >60 ML/MIN/1.73 M^2
EST. GFR  (NON AFRICAN AMERICAN): >60 ML/MIN/1.73 M^2
ETHANOL SERPL-MCNC: <5 MG/DL
ETHANOL SERPL-MCNC: <5 MG/DL
GLUCOSE SERPL-MCNC: 113 MG/DL (ref 70–110)
HCT VFR BLD AUTO: 33.9 % (ref 40–54)
HGB BLD-MCNC: 11.2 G/DL (ref 14–18)
IMM GRANULOCYTES # BLD AUTO: 0.02 K/UL (ref 0–0.04)
IMM GRANULOCYTES NFR BLD AUTO: 0.3 % (ref 0–0.5)
LYMPHOCYTES # BLD AUTO: 1.4 K/UL (ref 1–4.8)
LYMPHOCYTES NFR BLD: 20.4 % (ref 18–48)
MCH RBC QN AUTO: 35.8 PG (ref 27–31)
MCHC RBC AUTO-ENTMCNC: 33 G/DL (ref 32–36)
MCV RBC AUTO: 108 FL (ref 82–98)
MONOCYTES # BLD AUTO: 0.5 K/UL (ref 0.3–1)
MONOCYTES NFR BLD: 6.5 % (ref 4–15)
NEUTROPHILS # BLD AUTO: 5 K/UL (ref 1.8–7.7)
NEUTROPHILS NFR BLD: 71.5 % (ref 38–73)
NRBC BLD-RTO: 0 /100 WBC
PLATELET # BLD AUTO: 84 K/UL (ref 150–350)
PMV BLD AUTO: 10.7 FL (ref 9.2–12.9)
POTASSIUM SERPL-SCNC: 2.9 MMOL/L (ref 3.5–5.1)
PROT SERPL-MCNC: 8.5 G/DL (ref 6–8.4)
RBC # BLD AUTO: 3.13 M/UL (ref 4.6–6.2)
SARS-COV-2 RDRP RESP QL NAA+PROBE: NEGATIVE
SODIUM SERPL-SCNC: 137 MMOL/L (ref 136–145)
TROPONIN I SERPL DL<=0.01 NG/ML-MCNC: 0.03 NG/ML
TROPONIN I SERPL DL<=0.01 NG/ML-MCNC: <0.03 NG/ML
TSH SERPL DL<=0.005 MIU/L-ACNC: 1.9 UIU/ML (ref 0.34–5.6)
WBC # BLD AUTO: 6.95 K/UL (ref 3.9–12.7)

## 2020-10-18 PROCEDURE — G0378 HOSPITAL OBSERVATION PER HR: HCPCS

## 2020-10-18 PROCEDURE — 25000003 PHARM REV CODE 250: Performed by: EMERGENCY MEDICINE

## 2020-10-18 PROCEDURE — 36415 COLL VENOUS BLD VENIPUNCTURE: CPT

## 2020-10-18 PROCEDURE — 80053 COMPREHEN METABOLIC PANEL: CPT

## 2020-10-18 PROCEDURE — 84484 ASSAY OF TROPONIN QUANT: CPT

## 2020-10-18 PROCEDURE — 85025 COMPLETE CBC W/AUTO DIFF WBC: CPT

## 2020-10-18 PROCEDURE — 83036 HEMOGLOBIN GLYCOSYLATED A1C: CPT

## 2020-10-18 PROCEDURE — 21400001 HC TELEMETRY ROOM

## 2020-10-18 PROCEDURE — 96360 HYDRATION IV INFUSION INIT: CPT

## 2020-10-18 PROCEDURE — 99285 EMERGENCY DEPT VISIT HI MDM: CPT | Mod: 25

## 2020-10-18 PROCEDURE — 93010 EKG 12-LEAD: ICD-10-PCS | Mod: ,,, | Performed by: INTERNAL MEDICINE

## 2020-10-18 PROCEDURE — 80320 DRUG SCREEN QUANTALCOHOLS: CPT

## 2020-10-18 PROCEDURE — 83880 ASSAY OF NATRIURETIC PEPTIDE: CPT

## 2020-10-18 PROCEDURE — U0002 COVID-19 LAB TEST NON-CDC: HCPCS

## 2020-10-18 PROCEDURE — 84484 ASSAY OF TROPONIN QUANT: CPT | Mod: 91

## 2020-10-18 PROCEDURE — 93005 ELECTROCARDIOGRAM TRACING: CPT | Performed by: INTERNAL MEDICINE

## 2020-10-18 PROCEDURE — 25000003 PHARM REV CODE 250: Performed by: NURSE PRACTITIONER

## 2020-10-18 PROCEDURE — 93010 ELECTROCARDIOGRAM REPORT: CPT | Mod: ,,, | Performed by: INTERNAL MEDICINE

## 2020-10-18 PROCEDURE — 84443 ASSAY THYROID STIM HORMONE: CPT

## 2020-10-18 RX ORDER — ONDANSETRON 2 MG/ML
4 INJECTION INTRAMUSCULAR; INTRAVENOUS EVERY 6 HOURS PRN
Status: DISCONTINUED | OUTPATIENT
Start: 2020-10-18 | End: 2020-10-21 | Stop reason: HOSPADM

## 2020-10-18 RX ORDER — SODIUM CHLORIDE 0.9 % (FLUSH) 0.9 %
10 SYRINGE (ML) INJECTION
Status: DISCONTINUED | OUTPATIENT
Start: 2020-10-18 | End: 2020-10-21 | Stop reason: HOSPADM

## 2020-10-18 RX ORDER — NIFEDIPINE 60 MG/1
30 TABLET, EXTENDED RELEASE ORAL DAILY
Status: ON HOLD | COMMUNITY
End: 2020-10-27 | Stop reason: HOSPADM

## 2020-10-18 RX ORDER — PROPRANOLOL HYDROCHLORIDE 10 MG/1
10 TABLET ORAL 2 TIMES DAILY
Status: DISCONTINUED | OUTPATIENT
Start: 2020-10-18 | End: 2020-10-21 | Stop reason: HOSPADM

## 2020-10-18 RX ORDER — LABETALOL HYDROCHLORIDE 5 MG/ML
10 INJECTION, SOLUTION INTRAVENOUS
Status: DISCONTINUED | OUTPATIENT
Start: 2020-10-19 | End: 2020-10-21 | Stop reason: HOSPADM

## 2020-10-18 RX ORDER — POTASSIUM CHLORIDE 1.5 G/1.58G
40 POWDER, FOR SOLUTION ORAL
Status: DISCONTINUED | OUTPATIENT
Start: 2020-10-18 | End: 2020-10-21 | Stop reason: HOSPADM

## 2020-10-18 RX ORDER — PROPRANOLOL HYDROCHLORIDE 10 MG/1
10 TABLET ORAL 2 TIMES DAILY
Status: ON HOLD | COMMUNITY
End: 2021-02-17 | Stop reason: HOSPADM

## 2020-10-18 RX ORDER — TRAMADOL HYDROCHLORIDE 50 MG/1
50 TABLET ORAL EVERY 8 HOURS PRN
COMMUNITY
End: 2021-07-22

## 2020-10-18 RX ORDER — HYDROCODONE BITARTRATE AND ACETAMINOPHEN 5; 325 MG/1; MG/1
1 TABLET ORAL EVERY 6 HOURS PRN
Status: DISCONTINUED | OUTPATIENT
Start: 2020-10-18 | End: 2020-10-18

## 2020-10-18 RX ORDER — POTASSIUM CHLORIDE 20 MEQ/1
20 TABLET, EXTENDED RELEASE ORAL ONCE
Status: COMPLETED | OUTPATIENT
Start: 2020-10-18 | End: 2020-10-18

## 2020-10-18 RX ORDER — OXYCODONE HYDROCHLORIDE 5 MG/1
5 TABLET ORAL EVERY 6 HOURS PRN
Status: DISCONTINUED | OUTPATIENT
Start: 2020-10-18 | End: 2020-10-21 | Stop reason: HOSPADM

## 2020-10-18 RX ORDER — PANTOPRAZOLE SODIUM 20 MG/1
20 TABLET, DELAYED RELEASE ORAL DAILY
Status: DISCONTINUED | OUTPATIENT
Start: 2020-10-19 | End: 2020-10-20

## 2020-10-18 RX ORDER — POTASSIUM CHLORIDE 1.5 G/1.58G
40 POWDER, FOR SOLUTION ORAL ONCE
Status: COMPLETED | OUTPATIENT
Start: 2020-10-18 | End: 2020-10-18

## 2020-10-18 RX ORDER — POLYETHYLENE GLYCOL 3350 17 G/17G
17 POWDER, FOR SOLUTION ORAL 2 TIMES DAILY
Status: DISCONTINUED | OUTPATIENT
Start: 2020-10-18 | End: 2020-10-21 | Stop reason: HOSPADM

## 2020-10-18 RX ORDER — LANOLIN ALCOHOL/MO/W.PET/CERES
800 CREAM (GRAM) TOPICAL
Status: DISCONTINUED | OUTPATIENT
Start: 2020-10-18 | End: 2020-10-21 | Stop reason: HOSPADM

## 2020-10-18 RX ORDER — OXYCODONE HYDROCHLORIDE 5 MG/1
5 TABLET ORAL
Status: COMPLETED | OUTPATIENT
Start: 2020-10-18 | End: 2020-10-18

## 2020-10-18 RX ORDER — CARVEDILOL 6.25 MG/1
6.25 TABLET ORAL 2 TIMES DAILY WITH MEALS
Status: ON HOLD | COMMUNITY
End: 2020-10-21 | Stop reason: HOSPADM

## 2020-10-18 RX ORDER — PANTOPRAZOLE SODIUM 20 MG/1
20 TABLET, DELAYED RELEASE ORAL DAILY
Status: ON HOLD | COMMUNITY
End: 2020-11-08 | Stop reason: SDUPTHER

## 2020-10-18 RX ORDER — GLUCAGON 1 MG
1 KIT INJECTION
Status: DISCONTINUED | OUTPATIENT
Start: 2020-10-18 | End: 2020-10-21 | Stop reason: HOSPADM

## 2020-10-18 RX ORDER — GABAPENTIN 300 MG/1
300 CAPSULE ORAL ONCE
Status: COMPLETED | OUTPATIENT
Start: 2020-10-18 | End: 2020-10-18

## 2020-10-18 RX ORDER — ACETAMINOPHEN 325 MG/1
650 TABLET ORAL EVERY 4 HOURS PRN
Status: DISCONTINUED | OUTPATIENT
Start: 2020-10-18 | End: 2020-10-21 | Stop reason: HOSPADM

## 2020-10-18 RX ORDER — NIFEDIPINE 30 MG/1
60 TABLET, EXTENDED RELEASE ORAL DAILY
Status: DISCONTINUED | OUTPATIENT
Start: 2020-10-18 | End: 2020-10-21 | Stop reason: HOSPADM

## 2020-10-18 RX ORDER — SUCRALFATE 1 G/1
1 TABLET ORAL 4 TIMES DAILY
COMMUNITY
End: 2021-05-03

## 2020-10-18 RX ORDER — IBUPROFEN 200 MG
16 TABLET ORAL
Status: DISCONTINUED | OUTPATIENT
Start: 2020-10-18 | End: 2020-10-21 | Stop reason: HOSPADM

## 2020-10-18 RX ORDER — IBUPROFEN 200 MG
24 TABLET ORAL
Status: DISCONTINUED | OUTPATIENT
Start: 2020-10-18 | End: 2020-10-21 | Stop reason: HOSPADM

## 2020-10-18 RX ORDER — SUCRALFATE 1 G/1
1 TABLET ORAL 4 TIMES DAILY
Status: DISCONTINUED | OUTPATIENT
Start: 2020-10-18 | End: 2020-10-21 | Stop reason: HOSPADM

## 2020-10-18 RX ORDER — METHOCARBAMOL 500 MG/1
1000 TABLET, FILM COATED ORAL
Status: COMPLETED | OUTPATIENT
Start: 2020-10-18 | End: 2020-10-18

## 2020-10-18 RX ORDER — ONDANSETRON 4 MG/1
4 TABLET, ORALLY DISINTEGRATING ORAL EVERY 8 HOURS PRN
Status: DISCONTINUED | OUTPATIENT
Start: 2020-10-18 | End: 2020-10-21 | Stop reason: HOSPADM

## 2020-10-18 RX ORDER — TRAMADOL HYDROCHLORIDE 50 MG/1
50 TABLET ORAL EVERY 6 HOURS PRN
Status: DISCONTINUED | OUTPATIENT
Start: 2020-10-18 | End: 2020-10-21 | Stop reason: HOSPADM

## 2020-10-18 RX ORDER — CARVEDILOL 6.25 MG/1
6.25 TABLET ORAL 2 TIMES DAILY WITH MEALS
Status: DISCONTINUED | OUTPATIENT
Start: 2020-10-18 | End: 2020-10-21 | Stop reason: HOSPADM

## 2020-10-18 RX ADMIN — SODIUM CHLORIDE 500 ML: 0.9 INJECTION, SOLUTION INTRAVENOUS at 02:10

## 2020-10-18 RX ADMIN — OXYCODONE HYDROCHLORIDE 5 MG: 5 TABLET ORAL at 12:10

## 2020-10-18 RX ADMIN — POTASSIUM CHLORIDE 40 MEQ: 1.5 POWDER, FOR SOLUTION ORAL at 12:10

## 2020-10-18 RX ADMIN — PROPRANOLOL HYDROCHLORIDE 10 MG: 10 TABLET ORAL at 07:10

## 2020-10-18 RX ADMIN — SODIUM CHLORIDE 1000 ML: 9 INJECTION, SOLUTION INTRAVENOUS at 11:10

## 2020-10-18 RX ADMIN — POTASSIUM CHLORIDE 20 MEQ: 20 TABLET, EXTENDED RELEASE ORAL at 07:10

## 2020-10-18 RX ADMIN — GABAPENTIN 300 MG: 300 CAPSULE ORAL at 02:10

## 2020-10-18 RX ADMIN — METHOCARBAMOL 1000 MG: 500 TABLET ORAL at 12:10

## 2020-10-18 RX ADMIN — NIFEDIPINE 60 MG: 30 TABLET, FILM COATED, EXTENDED RELEASE ORAL at 07:10

## 2020-10-18 RX ADMIN — CARVEDILOL 6.25 MG: 6.25 TABLET, FILM COATED ORAL at 07:10

## 2020-10-18 RX ADMIN — TRAMADOL HYDROCHLORIDE 50 MG: 50 TABLET, FILM COATED ORAL at 11:10

## 2020-10-18 RX ADMIN — OXYCODONE HYDROCHLORIDE 5 MG: 5 TABLET ORAL at 07:10

## 2020-10-18 RX ADMIN — SUCRALFATE 1 G: 1 TABLET ORAL at 07:10

## 2020-10-18 NOTE — PROGRESS NOTES
MRI of Brain and C-Spine was done. Patient was having l-sided hand numbness and heaviness. Symptoms began three days ago.

## 2020-10-18 NOTE — H&P
Pending sale to [facility] History & Physical Examination   Patient Name: Irvin Diaz  MRN: [id number]  Patient Class: OP- Observation   Admission Date: 10/18/2020 10:02 AM  Length of Stay: 0  Attending Physician: Ben Jarrell MD  Primary Care Provider: [facility]  Face-to-Face encounter date: 10/18/2020  Code Status: full code  Chief Complaint: Arm Pain (X2-3 DAYS, L ARM)        Patient information was obtained from patient, past medical records and ER records.   HISTORY OF PRESENT ILLNESS:   Irvin Diaz is a 45 y.o. Black or  male who  has a past medical history of Alcoholic cirrhosis of liver, Arthritis, ATN (acute tubular necrosis), Diverticulitis (01/2020), GI bleed, Hip arthritis, Hypertension, Macrocytic anemia, Pulmonary embolism (08/2018), and Thrombocytopenia.. The patient presented to [facility] on 10/18/2020 with a primary complaint of Arm Pain (X2-3 DAYS, L ARM)  .       History was obtained from the patient, the family present at the bedside and ER physician Sign-out. Patient presents to the ED with the complaint of neck and left arm pain and tingling that began 2 days ago. He reports that pain began posterior neck. It radiates down his left arm, and he has numbness and tingling to his left hand. Pain is worse with movement and improved with rest. He denies fever, chills, cough, chest pain, sob, headache, visual changes, nausea, vomiting, abdominal pain, dysuria, diarrhea, lightheaded, dizziness, or LOC.     In the emergency room, patient is afebrile. CBC with platelets at 84 which appears to be at baseline. CMP with Potassium 2.9, Tbili 4.1, , ALT 59. BNP and troponin within reference ranges. I have reviewed the EKG and it shows Sinus tachycardia. No concerning finding on chest x ray. Mri brain negative for acute ischemia or other acute etiology. MRI of c-spine shows multilevel cervical spondylosis. The  patient is treated with IVF, muscle relaxer, analgesics, and potassium replacement. The ED provider would like to admit the patient for observation.     Decision to admit was taken and patient was informed about the plan of care.   REVIEW OF SYSTEMS:   10 Point Review of System was performed and was found to be negative except for that mentioned already in the HPI above.     PAST MEDICAL HISTORY:     Past Medical History:   Diagnosis Date    Alcoholic cirrhosis of liver     Arthritis     ATN (acute tubular necrosis)     Diverticulitis 2020    GI bleed     Hip arthritis     Left    Hypertension     Macrocytic anemia     Pulmonary embolism 2018    Unprovoked DVT.  Stop Coumadin due to GIB    Thrombocytopenia        PAST SURGICAL HISTORY:     Past Surgical History:   Procedure Laterality Date    ANKLE SURGERY      COLON SURGERY      [facility]    ESOPHAGOGASTRODUODENOSCOPY N/A 2019    Procedure: EGD (ESOPHAGOGASTRODUODENOSCOPY);  Surgeon: Brian Trivedi MD;  Location: [facility];  Service: Endoscopy;  Laterality: N/A;    ESOPHAGOGASTRODUODENOSCOPY N/A 2020    Procedure: EGD (ESOPHAGOGASTRODUODENOSCOPY);  Surgeon: Donn Acuna MD;  Location: [facility];  Service: Endoscopy;  Laterality: N/A;    HERNIA REPAIR Left     Inguinal       ALLERGIES:   Ciprofloxacin hcl and Morphine    FAMILY HISTORY:     Family History   Problem Relation Age of Onset    Hypertension Mother     Breast cancer Mother     Hypertension Father     Prostate cancer Father     Bladder Cancer Father        SOCIAL HISTORY:     Social History     Tobacco Use    Smoking status: Former Smoker     Quit date:      Years since quittin.8    Smokeless tobacco: Never Used    Tobacco comment: quit 20 years ago   Substance Use Topics    Alcohol use: Not Currently     Comment: freq        Social History     Substance and Sexual Activity   Sexual Activity Yes    Comment: occ        HOME  MEDICATIONS:     Prior to Admission medications    Medication Sig Start Date End Date Taking? Authorizing Provider   carvediloL (COREG) 6.25 MG tablet Take 6.25 mg by mouth 2 (two) times daily with meals.   Yes Historical Provider   NIFEdipine (ADALAT CC) 60 MG TbSR Take 30 mg by mouth once daily.   Yes Historical Provider   pantoprazole (PROTONIX) 20 MG tablet Take 20 mg by mouth once daily.   Yes Historical Provider   propranoloL (INDERAL) 10 MG tablet Take 10 mg by mouth 2 (two) times daily.   Yes Historical Provider   sucralfate (CARAFATE) 1 gram tablet Take 1 g by mouth 4 (four) times daily.   Yes Historical Provider   traMADoL (ULTRAM) 50 mg tablet Take 50 mg by mouth every 8 (eight) hours as needed for Pain.   Yes Historical Provider   amLODIPine (NORVASC) 5 MG tablet Take 2 tablets (10 mg total) by mouth once daily. 4/11/20 4/11/21  Ayah Whitney MD   carvediloL (COREG) 6.25 MG tablet Take 12.5 mg by mouth 2 (two) times daily with meals.     Historical Provider   ondansetron (ZOFRAN ODT) 4 MG TbDL Take 1 tablet (4 mg total) by mouth every 8 (eight) hours as needed (Nausea and Vomiting). 9/3/20   Edouard Haji MD   oxyCODONE-acetaminophen (PERCOCET) 5-325 mg per tablet Take 1 tablet by mouth every 6 (six) hours as needed for Pain. 9/3/20   Edouard Haji MD   polyethylene glycol (GLYCOLAX) 17 gram PwPk Take 17 g by mouth 2 (two) times daily. 9/9/20   Jeff Marquez MD   pantoprazole (PROTONIX) 40 MG tablet Take 1 tablet (40 mg total) by mouth 2 (two) times daily. 4/11/20 10/18/20  Ayah Whitney MD         PHYSICAL EXAM:   BP (!) 176/82   Pulse (!) 114   Temp 98.7 °F (37.1 °C) (Oral)   Resp 17   Ht 6' (1.829 m)   Wt 86.2 kg (190 lb)   SpO2 98%   BMI 25.77 kg/m²   Vitals Reviewed  General appearance: Well-developed, well-nourished,  Black or  male in no apparent distress.  Skin: No Rash. Warm, dry.   Neuro: AAOx3. Motor and sensory exams grossly intact. Good tone.  Power in all 4 extremities 5/5.   HENT: Atraumatic head. Moist mucous membranes of oral cavity.  Eyes: Normal extraocular movements. PERRLA  Neck: Supple. No evidence of lymphadenopathy. No thyroidomegaly. Tenderness to palpation  Lungs: Clear to auscultation bilaterally. No wheezing present.   Heart: Sinus tachycardia. S1 and S2 present with no murmurs/gallop/rub. No pedal edema. No JVD present.   Abdomen: Soft, non-distended, non-tender. No rebound tenderness/guarding. No masses or organomegaly. Bowel sounds are normal. Bladder is not palpable.   Extremities: No cyanosis, clubbing. Full ROM. Decreased sensation to left forearm. Weaker  to left hand  Psych/mental status: Mood and affect appropriate. Cooperative. Responds appropriately to questions.   EMERGENCY DEPARTMENT LABS AND IMAGING:     Labs Reviewed   CBC W/ AUTO DIFFERENTIAL - Abnormal; Notable for the following components:       Result Value    RBC 3.13 (*)     Hemoglobin 11.2 (*)     Hematocrit 33.9 (*)     Mean Corpuscular Volume 108 (*)     Mean Corpuscular Hemoglobin 35.8 (*)     RDW 15.4 (*)     Platelets 84 (*)     All other components within normal limits   COMPREHENSIVE METABOLIC PANEL - Abnormal; Notable for the following components:    Potassium 2.9 (*)     CO2 19 (*)     Glucose 113 (*)     Total Protein 8.5 (*)     Total Bilirubin 4.1 (*)      (*)     ALT 59 (*)     Anion Gap 18 (*)     All other components within normal limits    Narrative:     K critical result(s) repeated. Called and verbal readback obtained   from Shannan Bueno RN/ED by UYEN 10/18/2020 11:51   TROPONIN I   SARS-COV-2 RNA AMPLIFICATION, QUAL   B-TYPE NATRIURETIC PEPTIDE   ALCOHOL,MEDICAL (ETHANOL)   ALCOHOL,MEDICAL (ETHANOL)   B-TYPE NATRIURETIC PEPTIDE   HEMOGLOBIN A1C   TSH   TROPONIN I       US Upper Extremity Veins Left   Final Result      Negative for left upper extremity DVT.         Electronically signed by: Shamar Mercado MD   Date:    10/18/2020    Time:    13:48      MRI Cervical Spine Without Contrast   Final Result      Multilevel cervical spondylosis, most pronounced at C5-6 and C6-7.  There is moderate bilateral foraminal narrowing at these levels and mild spinal canal narrowing at these levels.  See details above.         Electronically signed by: Shamar Mercado MD   Date:    10/18/2020   Time:    12:07      MRI Brain Without Contrast   Final Result      Negative for acute ischemia or acute intracranial pathology.      Bilateral basal ganglia T1 hyperintensity.  This is likely secondary to idiopathic calcification, but can also be seen in the setting of Malik's disease and other metabolic abnormalities.         Electronically signed by: Shamar Mercado MD   Date:    10/18/2020   Time:    11:46      X-Ray Chest AP Portable   Final Result      No acute pulmonary process.         Electronically signed by: Shamar Mercado MD   Date:    10/18/2020   Time:    11:07          ASSESSMENT & PLAN:   Left arm pain and numbness/Neck pain  Tachycardia  Hypokalemia  HTN  Thrombocytopenia  Alcoholic cirrhosis  Hyperbilirubinemia  Elevated liver enzymes   Esophageal varices with banding    Admit to Telemetry  MRI of brain negative for ischemia or any other acute etiology  MRI C-spine shows multilevel cervical spondylosis, most pronounced a C5-6 and C6-7 with moderate bilateral foraminal narrowing  And spinal canal narrowing. This could be the cause of patient's neck and arm pain.  PRN analgesics  PT consult  Tachycardia improved with IVF; 1.5L given in the ED. No additional fluids at this time as the patient has cirrhosis  Potassium 2.9. Potassium 40meq PO given in the ED. Give additional 20meq now.  BP elevated. Patient reports he did not take any of his home medications today. So will give dose of home medications now  Labetalol prn with parameters  Patient reports he no longer drinks alcohol as he is trying to get on transplant list  Liver enzymes mildly elevated  as well as TBili; follow levels  Platelets appear to be at baseline; follow level  Patient has recent EGD and found to have esophageal varices with banding. No s/s of acute bleeding. Denies hematemesis. H/H stable    DVT Prophylaxis: Mechanical DVT prophylaxis and will be advised to be as mobile as possible and sit in a chair as tolerated.   ________________________________________________________________________________    Discharge Planning and Disposition: No mobility needs. Ambulating well. Patient will be discharged in 1-2 days  Face-to-Face encounter date: 10/18/2020  Encounter included review of the medical records, interviewing and examining the patient face-to-face, discussion with family and other health care providers including emergency medicine physician, admission orders, interpreting lab/test results and formulating a plan of care.   Medical Decision Making during this encounter was  [_] Low Complexity  [_] Moderate Complexity  [x] High Complexity  _________________________________________________________________________________    INPATIENT LIST OF MEDICATIONS     Current Facility-Administered Medications:     acetaminophen tablet 650 mg, 650 mg, Oral, Q4H PRN, Tatiana Dai NP    carvediloL tablet 6.25 mg, 6.25 mg, Oral, BID WM, Tatiana Dai NP    dextrose 50% injection 12.5 g, 12.5 g, Intravenous, PRN, Tatiana Dai NP    dextrose 50% injection 25 g, 25 g, Intravenous, PRN, Tatiana Dai NP    glucagon (human recombinant) injection 1 mg, 1 mg, Intramuscular, PRN, Tatiana Dai NP    glucose chewable tablet 16 g, 16 g, Oral, PRN, Tatiana Dai NP    glucose chewable tablet 24 g, 24 g, Oral, PRN, Tatiana Dai NP    ibuprofen tablet 600 mg, 600 mg, Oral, ED 1 Time, Roberto Maradiaga Jr., MD    magnesium oxide tablet 800 mg, 800 mg, Oral, PRN, Tatiana Dai NP    magnesium oxide tablet 800 mg, 800 mg, Oral, PRN, Tatiana Dai NP    NIFEdipine 24 hr  tablet 60 mg, 60 mg, Oral, Daily, Tatiana Dai NP    ondansetron disintegrating tablet 4 mg, 4 mg, Oral, Q8H PRN, Tatiana Dai NP    ondansetron injection 4 mg, 4 mg, Intravenous, Q6H PRN, Tatiana Dai NP    oxyCODONE immediate release tablet 5 mg, 5 mg, Oral, Q6H PRN, Tatiana Dai NP    [START ON 10/19/2020] pantoprazole EC tablet 20 mg, 20 mg, Oral, Daily, Tatiana Dai NP    polyethylene glycol packet 17 g, 17 g, Oral, BID, Tatiana Dai NP    potassium chloride packet 40 mEq, 40 mEq, Oral, PRN, Tatiana Dai NP    potassium chloride packet 40 mEq, 40 mEq, Oral, PRN, Tatiana Dai NP    potassium chloride packet 40 mEq, 40 mEq, Oral, PRN, Tatiana Dai NP    potassium chloride SA CR tablet 20 mEq, 20 mEq, Oral, Once, Tatiana Dai NP    propranoloL tablet 10 mg, 10 mg, Oral, BID, Tatiana Dai NP    sodium chloride 0.9% flush 10 mL, 10 mL, Intravenous, PRN, Tatiana Dai NP    sucralfate tablet 1 g, 1 g, Oral, QID, Tatiana Dai NP    traMADoL tablet 50 mg, 50 mg, Oral, Q6H PRN, Tatiana Dai NP    Current Outpatient Medications:     carvediloL (COREG) 6.25 MG tablet, Take 6.25 mg by mouth 2 (two) times daily with meals., Disp: , Rfl:     NIFEdipine (ADALAT CC) 60 MG TbSR, Take 30 mg by mouth once daily., Disp: , Rfl:     pantoprazole (PROTONIX) 20 MG tablet, Take 20 mg by mouth once daily., Disp: , Rfl:     propranoloL (INDERAL) 10 MG tablet, Take 10 mg by mouth 2 (two) times daily., Disp: , Rfl:     sucralfate (CARAFATE) 1 gram tablet, Take 1 g by mouth 4 (four) times daily., Disp: , Rfl:     traMADoL (ULTRAM) 50 mg tablet, Take 50 mg by mouth every 8 (eight) hours as needed for Pain., Disp: , Rfl:     amLODIPine (NORVASC) 5 MG tablet, Take 2 tablets (10 mg total) by mouth once daily., Disp: , Rfl:     carvediloL (COREG) 6.25 MG tablet, Take 12.5 mg by mouth 2 (two) times daily with meals. , Disp: , Rfl:      ondansetron (ZOFRAN ODT) 4 MG TbDL, Take 1 tablet (4 mg total) by mouth every 8 (eight) hours as needed (Nausea and Vomiting)., Disp: 12 tablet, Rfl: 0    oxyCODONE-acetaminophen (PERCOCET) 5-325 mg per tablet, Take 1 tablet by mouth every 6 (six) hours as needed for Pain., Disp: 8 tablet, Rfl: 0    polyethylene glycol (GLYCOLAX) 17 gram PwPk, Take 17 g by mouth 2 (two) times daily., Disp: 10 packet, Rfl: 3      Scheduled Meds:   carvediloL  6.25 mg Oral BID WM    ibuprofen  600 mg Oral ED 1 Time    NIFEdipine  60 mg Oral Daily    [START ON 10/19/2020] pantoprazole  20 mg Oral Daily    polyethylene glycol  17 g Oral BID    potassium chloride  20 mEq Oral Once    propranoloL  10 mg Oral BID    sucralfate  1 g Oral QID     Continuous Infusions:  PRN Meds:.acetaminophen, dextrose 50%, dextrose 50%, glucagon (human recombinant), glucose, glucose, magnesium oxide, magnesium oxide, ondansetron, ondansetron, oxyCODONE, potassium chloride, potassium chloride, potassium chloride, sodium chloride 0.9%, traMADoL      Tatiana Dai  [facility] Hospitalist  10/18/2020

## 2020-10-18 NOTE — ED NOTES
"First contact with pt. Dr. Henderson at bedside. Pt reports that he has had pain at the back of his neck and the pain radiates down his  left arm. He reports weakness to his left arm and numbness to his left hand. During MD exam, pt explains that he has decreased sensation in his left arm from the mid-upper arm to his fingertips. He has unequal hand  and explains that he cannot  as tightly with his left hand due to "the pain" in that arm.   "

## 2020-10-19 LAB
ALBUMIN SERPL BCP-MCNC: 3.4 G/DL (ref 3.5–5.2)
ALP SERPL-CCNC: 94 U/L (ref 55–135)
ALT SERPL W/O P-5'-P-CCNC: 53 U/L (ref 10–44)
ANION GAP SERPL CALC-SCNC: 10 MMOL/L (ref 8–16)
AST SERPL-CCNC: 100 U/L (ref 10–40)
BASOPHILS # BLD AUTO: 0.03 K/UL (ref 0–0.2)
BASOPHILS NFR BLD: 0.4 % (ref 0–1.9)
BILIRUB SERPL-MCNC: 4.3 MG/DL (ref 0.1–1)
BUN SERPL-MCNC: 13 MG/DL (ref 6–20)
CALCIUM SERPL-MCNC: 8.9 MG/DL (ref 8.7–10.5)
CHLORIDE SERPL-SCNC: 99 MMOL/L (ref 95–110)
CO2 SERPL-SCNC: 26 MMOL/L (ref 23–29)
CREAT SERPL-MCNC: 1 MG/DL (ref 0.5–1.4)
DIFFERENTIAL METHOD: ABNORMAL
EOSINOPHIL # BLD AUTO: 0.1 K/UL (ref 0–0.5)
EOSINOPHIL NFR BLD: 1.2 % (ref 0–8)
ERYTHROCYTE [DISTWIDTH] IN BLOOD BY AUTOMATED COUNT: 15 % (ref 11.5–14.5)
EST. GFR  (AFRICAN AMERICAN): >60 ML/MIN/1.73 M^2
EST. GFR  (NON AFRICAN AMERICAN): >60 ML/MIN/1.73 M^2
ESTIMATED AVG GLUCOSE: 111 MG/DL (ref 68–131)
GLUCOSE SERPL-MCNC: 125 MG/DL (ref 70–110)
HBA1C MFR BLD HPLC: 5.5 % (ref 4.5–6.2)
HCT VFR BLD AUTO: 32.9 % (ref 40–54)
HGB BLD-MCNC: 11.2 G/DL (ref 14–18)
IMM GRANULOCYTES # BLD AUTO: 0.03 K/UL (ref 0–0.04)
IMM GRANULOCYTES NFR BLD AUTO: 0.4 % (ref 0–0.5)
LYMPHOCYTES # BLD AUTO: 1.4 K/UL (ref 1–4.8)
LYMPHOCYTES NFR BLD: 18.5 % (ref 18–48)
MAGNESIUM SERPL-MCNC: 1.9 MG/DL (ref 1.6–2.6)
MAGNESIUM SERPL-MCNC: 1.9 MG/DL (ref 1.6–2.6)
MCH RBC QN AUTO: 35.4 PG (ref 27–31)
MCHC RBC AUTO-ENTMCNC: 34 G/DL (ref 32–36)
MCV RBC AUTO: 104 FL (ref 82–98)
MONOCYTES # BLD AUTO: 0.7 K/UL (ref 0.3–1)
MONOCYTES NFR BLD: 9.3 % (ref 4–15)
NEUTROPHILS # BLD AUTO: 5.5 K/UL (ref 1.8–7.7)
NEUTROPHILS NFR BLD: 70.2 % (ref 38–73)
NRBC BLD-RTO: 0 /100 WBC
PHOSPHATE SERPL-MCNC: 3 MG/DL (ref 2.7–4.5)
PLATELET # BLD AUTO: 73 K/UL (ref 150–350)
PMV BLD AUTO: 11 FL (ref 9.2–12.9)
POTASSIUM SERPL-SCNC: 3.4 MMOL/L (ref 3.5–5.1)
PROT SERPL-MCNC: 7.9 G/DL (ref 6–8.4)
RBC # BLD AUTO: 3.16 M/UL (ref 4.6–6.2)
SODIUM SERPL-SCNC: 135 MMOL/L (ref 136–145)
TROPONIN I SERPL DL<=0.01 NG/ML-MCNC: 0.03 NG/ML
TROPONIN I SERPL DL<=0.01 NG/ML-MCNC: <0.03 NG/ML
WBC # BLD AUTO: 7.78 K/UL (ref 3.9–12.7)

## 2020-10-19 PROCEDURE — 84484 ASSAY OF TROPONIN QUANT: CPT

## 2020-10-19 PROCEDURE — 85025 COMPLETE CBC W/AUTO DIFF WBC: CPT

## 2020-10-19 PROCEDURE — 80053 COMPREHEN METABOLIC PANEL: CPT

## 2020-10-19 PROCEDURE — 83735 ASSAY OF MAGNESIUM: CPT | Mod: 91

## 2020-10-19 PROCEDURE — 25000003 PHARM REV CODE 250: Performed by: INTERNAL MEDICINE

## 2020-10-19 PROCEDURE — 83735 ASSAY OF MAGNESIUM: CPT

## 2020-10-19 PROCEDURE — 97161 PT EVAL LOW COMPLEX 20 MIN: CPT

## 2020-10-19 PROCEDURE — 63600175 PHARM REV CODE 636 W HCPCS: Performed by: INTERNAL MEDICINE

## 2020-10-19 PROCEDURE — 84484 ASSAY OF TROPONIN QUANT: CPT | Mod: 91

## 2020-10-19 PROCEDURE — 84100 ASSAY OF PHOSPHORUS: CPT

## 2020-10-19 PROCEDURE — 97116 GAIT TRAINING THERAPY: CPT

## 2020-10-19 PROCEDURE — 21400001 HC TELEMETRY ROOM

## 2020-10-19 PROCEDURE — G0378 HOSPITAL OBSERVATION PER HR: HCPCS

## 2020-10-19 PROCEDURE — 25000003 PHARM REV CODE 250: Performed by: NURSE PRACTITIONER

## 2020-10-19 PROCEDURE — 36415 COLL VENOUS BLD VENIPUNCTURE: CPT

## 2020-10-19 RX ORDER — GABAPENTIN 300 MG/1
300 CAPSULE ORAL 3 TIMES DAILY
Status: DISCONTINUED | OUTPATIENT
Start: 2020-10-19 | End: 2020-10-21 | Stop reason: HOSPADM

## 2020-10-19 RX ORDER — DEXAMETHASONE SODIUM PHOSPHATE 100 MG/10ML
10 INJECTION INTRAMUSCULAR; INTRAVENOUS ONCE
Status: COMPLETED | OUTPATIENT
Start: 2020-10-19 | End: 2020-10-19

## 2020-10-19 RX ADMIN — SUCRALFATE 1 G: 1 TABLET ORAL at 04:10

## 2020-10-19 RX ADMIN — TRAMADOL HYDROCHLORIDE 50 MG: 50 TABLET, FILM COATED ORAL at 05:10

## 2020-10-19 RX ADMIN — POTASSIUM CHLORIDE 40 MEQ: 1.5 POWDER, FOR SOLUTION ORAL at 07:10

## 2020-10-19 RX ADMIN — TRAMADOL HYDROCHLORIDE 50 MG: 50 TABLET, FILM COATED ORAL at 11:10

## 2020-10-19 RX ADMIN — OXYCODONE HYDROCHLORIDE 5 MG: 5 TABLET ORAL at 08:10

## 2020-10-19 RX ADMIN — PROPRANOLOL HYDROCHLORIDE 10 MG: 10 TABLET ORAL at 09:10

## 2020-10-19 RX ADMIN — NIFEDIPINE 60 MG: 30 TABLET, FILM COATED, EXTENDED RELEASE ORAL at 09:10

## 2020-10-19 RX ADMIN — POTASSIUM CHLORIDE 40 MEQ: 1.5 POWDER, FOR SOLUTION ORAL at 05:10

## 2020-10-19 RX ADMIN — GABAPENTIN 300 MG: 300 CAPSULE ORAL at 10:10

## 2020-10-19 RX ADMIN — SUCRALFATE 1 G: 1 TABLET ORAL at 08:10

## 2020-10-19 RX ADMIN — SUCRALFATE 1 G: 1 TABLET ORAL at 01:10

## 2020-10-19 RX ADMIN — PROPRANOLOL HYDROCHLORIDE 10 MG: 10 TABLET ORAL at 08:10

## 2020-10-19 RX ADMIN — OXYCODONE HYDROCHLORIDE 5 MG: 5 TABLET ORAL at 01:10

## 2020-10-19 RX ADMIN — DEXAMETHASONE SODIUM PHOSPHATE 10 MG: 10 INJECTION INTRAMUSCULAR; INTRAVENOUS at 10:10

## 2020-10-19 RX ADMIN — SUCRALFATE 1 G: 1 TABLET ORAL at 09:10

## 2020-10-19 RX ADMIN — CARVEDILOL 6.25 MG: 6.25 TABLET, FILM COATED ORAL at 07:10

## 2020-10-19 RX ADMIN — CARVEDILOL 6.25 MG: 6.25 TABLET, FILM COATED ORAL at 04:10

## 2020-10-19 RX ADMIN — PANTOPRAZOLE SODIUM 20 MG: 20 TABLET, DELAYED RELEASE ORAL at 09:10

## 2020-10-19 NOTE — PROGRESS NOTES
Results for MARIA ISABEL LUX (MRN [id number]) as of 10/19/2020 05:38   Ref. Range 10/19/2020 01:57   Potassium Latest Ref Range: 3.5 - 5.1 mmol/L 3.4 (L)   Replacement given per MAR.

## 2020-10-19 NOTE — PT/OT/SLP EVAL
Physical Therapy Evaluation    Patient Name:  Irvin Diaz   MRN:  [id number]    Recommendations:     Discharge Recommendations:  home, outpatient PT   Discharge Equipment Recommendations: none   Barriers to discharge: None    Assessment:     Irvin Diaz is a 45 y.o. male admitted with a medical diagnosis of Tachycardia.  He presents with the following impairments/functional limitations:  weakness, impaired functional mobilty, gait instability, impaired balance, decreased upper extremity function, decreased lower extremity function, pain, decreased safety awareness, decreased ROM. Pt supine; states severe neck pain that radiates into shoulder and left upper extremity into hand; denies trauma or fall. MRI positive for spondylosis at C5-6, and C6-7 with spinal canal and foraminal narrowing. PT offered ice and patient states this does not help. Evaluation and assessment performed and gait training performed; pt states not comfortable sitting up in chair and would rater return to bed 2/2 high pain level. RN aware.     Rehab Prognosis: Good; patient would benefit from acute skilled PT services to address these deficits and reach maximum level of function.    Recent Surgery: * No surgery found *      Plan:     During this hospitalization, patient to be seen 5 x/week to address the identified rehab impairments via gait training, therapeutic activities, therapeutic exercises and progress toward the following goals:    · Plan of Care Expires:  11/19/20    Subjective     Chief Complaint: neck and L UE pain  Patient/Family Comments/goals: home  Pain/Comfort:  · Pain Rating 1: 10/10  · Location - Side 1: Left  · Location - Orientation 1: upper  · Location 1: neck(into shoulder, into arm and hand)  · Pain Addressed 1: Cessation of Activity, Reposition, Nurse notified  · Pain Rating Post-Intervention 1: 10/10    Patients cultural, spiritual, Taoist conflicts given the current situation:      Living Environment:  Pt  lives at home with family; teenage son present; [facility].  Prior to admission, patients level of function was Mod indep with wide based single point cane; uses cane 2/2 L hip OA (pain and weakness).  Equipment used at home: (wide based single point cane).  DME owned (not currently used): rolling walker.  Upon discharge, patient will have assistance from family.    Objective:     Communicated with RN prior to session.  Patient found supine with telemetry  upon PT entry to room.    General Precautions: Standard, fall   Orthopedic Precautions:    Braces:       Exams:  · Cognitive Exam:  Patient is oriented to Person, Place, Time and Situation  · RUE ROM: WNL  · RUE Strength: WNL  · LUE ROM: decreased ROM at shouder, elbow and hand; pain and guarding  · LUE Strength: 3-/5  · RLE ROM: WNL  · RLE Strength: WNL  · LLE ROM: grossly 3/5 2/2 history of hip OA   · LLE Strength: hip flexion 3-/5; knee extension 3/5; DF and PF 4/5    Functional Mobility:  · Bed Mobility:     · Rolling Left:  supervision  · Supine to Sit: supervision  · Sit to Supine: supervision  · Transfers:     · Sit to Stand:  stand by assistance with no AD  · Gait: 10' with wide based SPC with CGA; limited gait training 2/2 pain  · Balance: good in sitting; fair in standing    Therapeutic Activities and Exercises:   bed mobility; sitting EOB for trunk control and midline orientation; transfer training; PT educated pt/ family on importance of out of bed to chair and functional mobility to negate negative effects of prolonged bed rest. Will progress activity as tolerated by patient; attempted repositioning L UE and neck in neutral while pt supine    AM-PAC 6 CLICK MOBILITY  Total Score:22     Patient left supine with all lines intact, call button in reach and RN notified.    GOALS:   Multidisciplinary Problems     Physical Therapy Goals        Problem: Physical Therapy Goal    Goal Priority Disciplines Outcome Goal Variances Interventions   Physical Therapy  Goal     PT, PT/OT      Description: Goals to be met by: discharge       Patient will increase functional independence with mobility by performin. Supine to sit with Modified Cameron  2. Sit to supine with Modified Cameron  3. Sit to stand transfer with Modified Cameron  4. Bed to chair transfer with Modified Cameron using Single-point Cane   5. Gait  x 200 feet with Supervision using Single-point Cane .                      History:     Past Medical History:   Diagnosis Date    Alcoholic cirrhosis of liver     Arthritis     ATN (acute tubular necrosis)     Diverticulitis 2020    GI bleed     Hip arthritis     Left    Hypertension     Macrocytic anemia     Pulmonary embolism 2018    Unprovoked DVT.  Stop Coumadin due to GIB    Thrombocytopenia        Past Surgical History:   Procedure Laterality Date    ANKLE SURGERY      COLON SURGERY      [facility]    ESOPHAGOGASTRODUODENOSCOPY N/A 2019    Procedure: EGD (ESOPHAGOGASTRODUODENOSCOPY);  Surgeon: Brian Trivedi MD;  Location: [facility] [place], [place];  Service: Endoscopy;  Laterality: N/A;    ESOPHAGOGASTRODUODENOSCOPY N/A 2020    Procedure: EGD (ESOPHAGOGASTRODUODENOSCOPY);  Surgeon: Donn Acuna MD;  Location: [facility] [place], [place];  Service: Endoscopy;  Laterality: N/A;    HERNIA REPAIR Left     Inguinal       Time Tracking:     PT Received On: 10/19/20  PT Start Time: 1037     PT Stop Time: 1050  PT Total Time (min): 13 min     Billable Minutes: Evaluation 5 and Gait Training 8      Maxine Bansal, PT  10/19/2020

## 2020-10-19 NOTE — ED PROVIDER NOTES
Encounter Date: 10/18/2020       History     Chief Complaint   Patient presents with    Arm Pain     X2-3 DAYS, L ARM     HPI     Seen and evaluated.  Presented with chief complaint of left upper extremity pain and weakness.  This has been for several days.  He denies any traumatic injury.  This is an acute exacerbation and notes is weaker in his the home.  He also has numbness throughout the entirety of his.  This occurs throughout the entirety of his on.  Stents about mid humerus and is persistent all the way down with no specific nerve distribution.  Symptoms of moderate persistent ongoing.  He reports pain associated with this complaint.  He has no associated fever chills nausea vomiting or diarrhea.        Review of patient's allergies indicates:   Allergen Reactions    Ciprofloxacin hcl Hallucinations    Morphine Itching     Past Medical History:   Diagnosis Date    Alcoholic cirrhosis of liver     Arthritis     ATN (acute tubular necrosis)     Diverticulitis 01/2020    GI bleed     Hip arthritis     Left    Hypertension     Macrocytic anemia     Pulmonary embolism 08/2018    Unprovoked DVT.  Stop Coumadin due to GIB    Thrombocytopenia      Past Surgical History:   Procedure Laterality Date    ANKLE SURGERY      COLON SURGERY  2007    [facility]  09[facility]05/[facility]    ESOPHAGOGASTRODUODENOSCOPY N/A 7/26/2019    Procedure: EGD (ESOPHAGOGASTRODUODENOSCOPY);  Surgeon: Brian Trivedi MD;  Location: [facility];  Service: Endoscopy;  Laterality: N/A;    ESOPHAGOGASTRODUODENOSCOPY N/A 4/8/2020    Procedure: EGD (ESOPHAGOGASTRODUODENOSCOPY);  Surgeon: Donn Acuna MD;  Location: [facility];  Service: Endoscopy;  Laterality: N/A;    HERNIA REPAIR Left     Inguinal     Family History   Problem Relation Age of Onset    Hypertension Mother     Breast cancer Mother     Hypertension Father     Prostate cancer Father     Bladder Cancer Father      Social History     Tobacco Use    Smoking  status: Former Smoker     Quit date:      Years since quittin.8    Smokeless tobacco: Never Used    Tobacco comment: quit 20 years ago   Substance Use Topics    Alcohol use: Not Currently     Comment: freq    Drug use: Not Currently     Types: Marijuana     Review of Systems   Constitutional: Negative for fever.   HENT: Negative for sore throat.    Respiratory: Negative for shortness of breath.    Cardiovascular: Negative for chest pain.   Gastrointestinal: Negative for nausea.   Genitourinary: Negative for dysuria.   Musculoskeletal: Positive for arthralgias. Negative for back pain.   Skin: Negative for rash.   Neurological: Positive for weakness.   Psychiatric/Behavioral: Negative for confusion.   All other systems reviewed and are negative.      Physical Exam     Initial Vitals [10/18/20 1000]   BP Pulse Resp Temp SpO2   (!) 188/98 (!) 125 20 98.7 °F (37.1 °C) 98 %      MAP       --         Physical Exam    Nursing note and vitals reviewed.  Constitutional: He appears well-developed and well-nourished.   HENT:   Head: Normocephalic and atraumatic.   Eyes: Conjunctivae are normal.   Cardiovascular: Normal rate and regular rhythm.   Pulmonary/Chest: No respiratory distress.   Abdominal: Soft. Normal appearance.   Musculoskeletal: Normal range of motion.   Neurological: He is alert and oriented to person, place, and time.   5/5 right upper, 4/5 with with reported sensory disruption throughout the left upper extremity.   Skin: Skin is warm and dry.   Psychiatric: He has a normal mood and affect. His speech is normal.         ED Course   Procedures  Labs Reviewed   CBC W/ AUTO DIFFERENTIAL - Abnormal; Notable for the following components:       Result Value    RBC 3.13 (*)     Hemoglobin 11.2 (*)     Hematocrit 33.9 (*)     Mean Corpuscular Volume 108 (*)     Mean Corpuscular Hemoglobin 35.8 (*)     RDW 15.4 (*)     Platelets 84 (*)     All other components within normal limits   COMPREHENSIVE METABOLIC  PANEL - Abnormal; Notable for the following components:    Potassium 2.9 (*)     CO2 19 (*)     Glucose 113 (*)     Total Protein 8.5 (*)     Total Bilirubin 4.1 (*)      (*)     ALT 59 (*)     Anion Gap 18 (*)     All other components within normal limits    Narrative:     K critical result(s) repeated. Called and verbal readback obtained   from Shannan Bueno RN/ED by [facility] 10/18/2020 11:51   TROPONIN I   SARS-COV-2 RNA AMPLIFICATION, QUAL   B-TYPE NATRIURETIC PEPTIDE   B-TYPE NATRIURETIC PEPTIDE        ECG Results          EKG 12-lead (In process)  Result time 10/18/20 10:34:59    In process by Interface, Lab In [facility] (10/18/20 10:34:59)                 Narrative:    Test Reason : M79.603,    Vent. Rate : 125 BPM     Atrial Rate : 125 BPM     P-R Int : 126 ms          QRS Dur : 088 ms      QT Int : 342 ms       P-R-T Axes : 062 078 -28 degrees     QTc Int : 493 ms    Sinus tachycardia  Minimal voltage criteria for LVH, may be normal variant  T wave abnormality, consider inferior ischemia  Abnormal ECG  When compared with ECG of 18-OCT-2020 10:15,  No significant change was found    Referred By: [email or web address]   SELF           Confirmed By:                             Imaging Results          US Upper Extremity Veins Left (Final result)  Result time 10/18/20 13:48:03    Final result by Shamar Mercado MD (10/18/20 13:48:03)                 Impression:      Negative for left upper extremity DVT.      Electronically signed by: Shamar Mercado MD  Date:    10/18/2020  Time:    13:48             Narrative:    EXAMINATION:  pain, tachycardia;    CLINICAL HISTORY:  pain, tachycardia;    TECHNIQUE:  Grayscale, color and spectral Doppler analysis of the left upper extremity deep venous system was performed.    COMPARISON:  None    FINDINGS:  There is normal compressibility, with normal flow by color and spectral Doppler analysis in the left upper extremity deep venous system, with no evidence of deep venous thrombosis.                                MRI Cervical Spine Without Contrast (Final result)  Result time 10/18/20 12:07:21    Final result by Shamar Mercado MD (10/18/20 12:07:21)                 Impression:      Multilevel cervical spondylosis, most pronounced at C5-6 and C6-7.  There is moderate bilateral foraminal narrowing at these levels and mild spinal canal narrowing at these levels.  See details above.      Electronically signed by: Shamar Mercado MD  Date:    10/18/2020  Time:    12:07             Narrative:    EXAMINATION:  MRI CERVICAL SPINE WITHOUT CONTRAST    CLINICAL HISTORY:  Neck pain, abnormal neuro exam;    TECHNIQUE:  Multisequence, multiplanar imaging of the cervical spine obtained without contrast.    COMPARISON:  No prior cervical spine imaging is available for comparison    FINDINGS:  Please see separately dictated brain CT for intracranial findings.  Craniocervical junction is intact.  Straightening of cervical spine with loss of normal lordotic curvature, potentially positional or muscle spasm related.  No prevertebral soft tissue swelling.  No cervical anterolisthesis or retrolisthesis.    Mild reactive marrow changes involving the opposing C6-7 vertebral bodies.  No other bone marrow signal abnormality.  Paraspinal muscles appear normal.  No gross cervical soft tissue abnormality is evident.    Normal morphology and signal intensity of the cervical spinal cord.    C2-3: No significant abnormality.    C3-4: Small bilateral paracentral disc protrusions which minimally narrow the spinal canal.  No neural foramen narrowing.    C4-5: No significant abnormality.    C5-6: Degenerative disc space loss.  Moderate broad-based posterior disc osteophyte complex which mildly narrows the spinal canal.  Central disc extrusion, with herniated disc tissue extending inferiorly along the posterior aspect of the C6 vertebral body.  Bilateral uncovertebral spurring which causes moderate bilateral neural foramen  narrowing.    C6-7: Degenerative disc space loss.  Moderate broad-based posterior disc osteophyte complex which mildly narrows the spinal canal.  Central disc extrusion extending superiorly, contiguous with the C5-6 disc extrusion extending inferiorly, along the posterior aspect of the C6 vertebral body.  Bilateral uncovertebral spurring causing moderate bilateral neural foramen narrowing, greater on the right.    C7-T1: No significant abnormality.                               MRI Brain Without Contrast (Final result)  Result time 10/18/20 11:46:24    Final result by Shamar Mercado MD (10/18/20 11:46:24)                 Impression:      Negative for acute ischemia or acute intracranial pathology.    Bilateral basal ganglia T1 hyperintensity.  This is likely secondary to idiopathic calcification, but can also be seen in the setting of Malik's disease and other metabolic abnormalities.      Electronically signed by: Shamar Mercado MD  Date:    10/18/2020  Time:    11:46             Narrative:    EXAMINATION:  MRI BRAIN WITHOUT CONTRAST    CLINICAL HISTORY:  Neuro deficit, acute, persistent or progressing;    TECHNIQUE:  Multisequence, multiplanar imaging through the brain performed without IV contrast.    COMPARISON:  CT head without contrast 01/14/2020    FINDINGS:  Diffusion-weighted imaging is negative for acute ischemia or focal lesion.  Gradient echo images demonstrate bilateral basal ganglia susceptibility artifact related to basal ganglia mineralization.  Bilateral and symmetric basal ganglia T1 hyperintensity.  No evidence of intracranial hemorrhage.    No intracranial mass, midline shift, or mass effect.  Ventricles and sulci are normal in size.  No significant T2/FLAIR white matter signal abnormality.  Cerebellar hemispheres and brainstem are unremarkable.  Major intracranial T2 flow voids are patent.    Visualized paranasal sinuses and mastoid air cells are clear.  Orbits are unremarkable.    No bone  marrow signal abnormality.  Pituitary gland is unremarkable.                               X-Ray Chest AP Portable (Final result)  Result time 10/18/20 11:07:36   Procedure changed from X-Ray Chest 1 View     Final result by Shamar Mercado MD (10/18/20 11:07:36)                 Impression:      No acute pulmonary process.      Electronically signed by: Shamar Mercado MD  Date:    10/18/2020  Time:    11:07             Narrative:    EXAMINATION:  XR CHEST AP PORTABLE    CLINICAL HISTORY:  arm pain; Pain in arm, unspecified    COMPARISON:  04/08/2020    FINDINGS:  Cardiac silhouette size is within normal limits.  No airspace consolidation.  No pleural effusion or pneumothorax.  No acute osseous abnormality.                                 Medical Decision Making:   Initial Assessment:   Seen and evaluated.  Presented with noted tachycardia and complaint of left upper extremity weakness sensory disruption.  MRI obtained of brain and cervical spine shows likely reactive arthritis of the cervical spine otherwise stable.  Cardiac markers were negative.  He clinically otherwise appears stable.  Of note he has had persistent tachycardia that did improve but did not resolve with IV fluids.  With minimal exertion his heart rate goes into the as high as the 120's.  Had discussed this Internal Medicine observe the patient.                             Clinical Impression:       ICD-10-CM ICD-9-CM   1. Arm pain  M79.603 729.5   2. Tachycardia  R00.0 785.0   3. Chest pain  R07.9 786.50                          ED Disposition Condition    Observation                             Roberto Maradiaga Jr., MD  10/19/20 2712

## 2020-10-19 NOTE — PLAN OF CARE
10/19/20 1207   Discharge Assessment   Assessment Type Discharge Planning Assessment   Confirmed/corrected address and phone number on facesheet? Yes   Assessment information obtained from? Patient   Expected Length of Stay (days) 2   Communicated expected length of stay with patient/caregiver yes   Prior to hospitilization cognitive status: Alert/Oriented   Prior to hospitalization functional status: Independent;Assistive Equipment   Current cognitive status: Alert/Oriented   Current Functional Status: Independent;Assistive Equipment   Facility Arrived From: home   Lives With spouse   Able to Return to Prior Arrangements yes   Is patient able to care for self after discharge? Yes   Who are your caregiver(s) and their phone number(s)? spouse Florentino [phone]   Patient's perception of discharge disposition home or selfcare   Readmission Within the Last 30 Days no previous admission in last 30 days   Patient currently being followed by outpatient case management? No   Patient currently receives any other outside agency services? No   Is it the patient/care giver preference to resume care with the current outside agency? No   Equipment Currently Used at Home walker, rolling;cane, straight   Do you have any problems affording any of your prescribed medications? No   Is the patient taking medications as prescribed? yes   Does the patient have transportation home? Yes   Transportation Anticipated family or friend will provide   Dialysis Name and Scheduled days na   Does the patient receive services at the Coumadin Clinic? No   Discharge Plan A Home with family   Discharge Plan B Home with family   DME Needed Upon Discharge  none   Patient denies and needs at this time.

## 2020-10-20 PROBLEM — M79.603 ARM PAIN: Status: ACTIVE | Noted: 2020-10-20

## 2020-10-20 PROCEDURE — 21400001 HC TELEMETRY ROOM

## 2020-10-20 PROCEDURE — 25000003 PHARM REV CODE 250: Performed by: INTERNAL MEDICINE

## 2020-10-20 PROCEDURE — 97116 GAIT TRAINING THERAPY: CPT | Mod: CQ

## 2020-10-20 PROCEDURE — 25000003 PHARM REV CODE 250: Performed by: NURSE PRACTITIONER

## 2020-10-20 PROCEDURE — 63600175 PHARM REV CODE 636 W HCPCS: Performed by: NEUROLOGICAL SURGERY

## 2020-10-20 RX ORDER — PANTOPRAZOLE SODIUM 40 MG/1
40 TABLET, DELAYED RELEASE ORAL DAILY
Status: DISCONTINUED | OUTPATIENT
Start: 2020-10-21 | End: 2020-10-21 | Stop reason: HOSPADM

## 2020-10-20 RX ORDER — DEXAMETHASONE SODIUM PHOSPHATE 4 MG/ML
4 INJECTION, SOLUTION INTRA-ARTICULAR; INTRALESIONAL; INTRAMUSCULAR; INTRAVENOUS; SOFT TISSUE EVERY 6 HOURS
Status: DISCONTINUED | OUTPATIENT
Start: 2020-10-21 | End: 2020-10-21 | Stop reason: HOSPADM

## 2020-10-20 RX ORDER — DEXAMETHASONE SODIUM PHOSPHATE 4 MG/ML
2 INJECTION, SOLUTION INTRA-ARTICULAR; INTRALESIONAL; INTRAMUSCULAR; INTRAVENOUS; SOFT TISSUE EVERY 8 HOURS
Status: DISCONTINUED | OUTPATIENT
Start: 2020-10-20 | End: 2020-10-20

## 2020-10-20 RX ADMIN — SUCRALFATE 1 G: 1 TABLET ORAL at 04:10

## 2020-10-20 RX ADMIN — NIFEDIPINE 60 MG: 30 TABLET, FILM COATED, EXTENDED RELEASE ORAL at 09:10

## 2020-10-20 RX ADMIN — DEXAMETHASONE SODIUM PHOSPHATE 2 MG: 4 INJECTION, SOLUTION INTRA-ARTICULAR; INTRALESIONAL; INTRAMUSCULAR; INTRAVENOUS; SOFT TISSUE at 09:10

## 2020-10-20 RX ADMIN — SUCRALFATE 1 G: 1 TABLET ORAL at 09:10

## 2020-10-20 RX ADMIN — PROPRANOLOL HYDROCHLORIDE 10 MG: 10 TABLET ORAL at 09:10

## 2020-10-20 RX ADMIN — DEXAMETHASONE SODIUM PHOSPHATE 2 MG: 4 INJECTION, SOLUTION INTRA-ARTICULAR; INTRALESIONAL; INTRAMUSCULAR; INTRAVENOUS; SOFT TISSUE at 01:10

## 2020-10-20 RX ADMIN — PANTOPRAZOLE SODIUM 20 MG: 20 TABLET, DELAYED RELEASE ORAL at 09:10

## 2020-10-20 RX ADMIN — SUCRALFATE 1 G: 1 TABLET ORAL at 01:10

## 2020-10-20 RX ADMIN — GABAPENTIN 300 MG: 300 CAPSULE ORAL at 09:10

## 2020-10-20 RX ADMIN — GABAPENTIN 300 MG: 300 CAPSULE ORAL at 04:10

## 2020-10-20 RX ADMIN — CARVEDILOL 6.25 MG: 6.25 TABLET, FILM COATED ORAL at 05:10

## 2020-10-20 RX ADMIN — CARVEDILOL 6.25 MG: 6.25 TABLET, FILM COATED ORAL at 09:10

## 2020-10-20 NOTE — PROGRESS NOTES
[facility] Medicine Progress Note    Subjective:     10/19:  C/o persistent, sharp neck pain. Having paresthesias in the LUE and c/o of his  feeling weaker today.      Objective:   Last 24 Hour Vital Signs:  BP  Min: 104/58  Max: 171/97  Temp  Av.1 °F (36.7 °C)  Min: 97.5 °F (36.4 °C)  Max: 98.5 °F (36.9 °C)  Pulse  Av  Min: 62  Max: 83  Resp  Av.6  Min: 16  Max: 18  SpO2  Av.2 %  Min: 95 %  Max: 99 %  I/O last 3 completed shifts:  In: 1500 [IV Piggyback:1500]  Out: 200 [Urine:200]    Physical Examination:  Physical Exam  Vitals signs reviewed.   Constitutional:       General: He is not in acute distress.     Appearance: Normal appearance.   HENT:      Head: Normocephalic and atraumatic.      Nose: No congestion.      Mouth/Throat:      Mouth: Mucous membranes are moist.      Pharynx: Oropharynx is clear.   Eyes:      Pupils: Pupils are equal, round, and reactive to light.   Neck:      Musculoskeletal: No neck rigidity or muscular tenderness.      Comments: Mild pain with active flexion/extension of neck   Cardiovascular:      Rate and Rhythm: Normal rate and regular rhythm.   Pulmonary:      Effort: Pulmonary effort is normal. No respiratory distress.   Abdominal:      General: There is no distension.      Tenderness: There is no abdominal tenderness.   Skin:     General: Skin is warm and dry.      Capillary Refill: Capillary refill takes less than 2 seconds.   Neurological:      Mental Status: He is alert and oriented to person, place, and time.      Cranial Nerves: No cranial nerve deficit.      Gait: Gait normal.      Comments: Diminished sensation to light touch distal LUE  4/5  strength LUE, 5/5 RUE   B/l LE strength is 5/5    Psychiatric:         Mood and Affect: Mood normal.         Behavior: Behavior normal.           Laboratory:  Laboratory Data Reviewed: yes    Most Recent Data:  CBC:   Lab Results   Component Value Date    WBC 7.78 10/19/2020    HGB 11.2 (L) 10/19/2020     HCT 32.9 (L) 10/19/2020    PLT 73 (L) 10/19/2020     (H) 10/19/2020    RDW 15.0 (H) 10/19/2020     BMP:   Lab Results   Component Value Date     (L) 10/19/2020    K 3.4 (L) 10/19/2020    CL 99 10/19/2020    CO2 26 10/19/2020    BUN 13 10/19/2020     (H) 10/19/2020    CALCIUM 8.9 10/19/2020    MG 1.9 10/19/2020    MG 1.9 10/19/2020    PHOS 3.0 10/19/2020     LFTs:   Lab Results   Component Value Date    PROT 7.9 10/19/2020    ALBUMIN 3.4 (L) 10/19/2020    BILITOT 4.3 (H) 10/19/2020     (H) 10/19/2020    ALKPHOS 94 10/19/2020    ALT 53 (H) 10/19/2020     Coags:   Lab Results   Component Value Date    INR 1.3 (H) 04/08/2020     FLP:   Lab Results   Component Value Date    CHOL 175 09/09/2020    HDL 26 (L) 09/09/2020    LDLCALC 136.2 09/09/2020    TRIG 64 09/09/2020    CHOLHDL 14.9 (L) 09/09/2020     DM:   Lab Results   Component Value Date    HGBA1C 5.5 10/18/2020    HGBA1C 5.0 09/09/2020    LDLCALC 136.2 09/09/2020    CREATININE 1.0 10/19/2020     Thyroid:   Lab Results   Component Value Date    TSH 1.900 10/18/2020     Anemia:   Lab Results   Component Value Date    IRON 72 01/11/2020    TIBC 237 (L) 01/11/2020    FERRITIN 260 01/11/2020    [id number] 1125 (H) 07/26/2019    FOLATE 4.5 07/26/2019     Cardiac:   Lab Results   Component Value Date    TROPONINI <0.030 10/19/2020    BNP 33 10/18/2020     Urinalysis:   Lab Results   Component Value Date    LABURIN No significant growth 01/09/2020    COLORU Yellow 09/08/2020    SPECGRAV 1.020 09/08/2020    NITRITE Negative 09/08/2020    KETONESU Negative 09/08/2020    UROBILINOGEN >=8.0 (A) 09/08/2020    WBCUA 1 09/08/2020        Radiology:  Data Reviewed: yes  MRI BRAIN WITHOUT CONTRAST  MRI CERVICAL SPINE WITHOUT CONTRAST  XR CHEST AP PORTABLE  US UPPER EXTREMITY VEINS LEFT      Current Medications:     Infusions:       Scheduled:   carvediloL  6.25 mg Oral BID WM    gabapentin  300 mg Oral TID    NIFEdipine  60 mg Oral Daily    pantoprazole   20 mg Oral Daily    polyethylene glycol  17 g Oral BID    predniSONE  50 mg Oral Daily    propranoloL  10 mg Oral BID    sucralfate  1 g Oral QID        PRN:  acetaminophen, dextrose 50%, dextrose 50%, glucagon (human recombinant), glucose, glucose, labetalol, magnesium oxide, magnesium oxide, ondansetron, ondansetron, oxyCODONE, potassium chloride, potassium chloride, potassium chloride, sodium chloride 0.9%, traMADoL    Lines and Day Number of Therapy:      Microbiology Data:  Reviewed: yes  Microbiology Results (last 7 days)     ** No results found for the last 168 hours. **           Antibiotics and Day Number of Therapy:  Antibiotics (From admission, onward)    None         Antivirals (From admission, onward)    None             Assessment/Plan:     Irvin Diaz is a 45 y.o.male with    Suspected cervical radiculopathy with LUE weakness  Tachycardia - resolved  Hypokalemia - resolved   HTN  Thrombocytopenia  Alcoholic cirrhosis  Hyperbilirubinemia  Elevated liver enzymes   Esophageal varices with banding     MRI of brain negative for ischemia or any other acute etiology  MRI C-spine shows multilevel cervical spondylosis, most pronounced a C5-6 and C6-7 with moderate bilateral foraminal narrowing  And spinal canal narrowing.   Given c/o of focal weakness with loss of  strength, will start steroids and consult nsgy.     Patient has recent EGD and found to have esophageal varices with banding. No s/s of acute bleeding. Denies hematemesis. H/H stable       Elton Abbasi  [facility] Medicine

## 2020-10-20 NOTE — PLAN OF CARE
Problem: Physical Therapy Goal  Goal: Physical Therapy Goal  Description: Goals to be met by: discharge       Patient will increase functional independence with mobility by performin. Supine to sit with Modified [place]  2. Sit to supine with Modified [place]  3. Sit to stand transfer with Modified [place]  4. Bed to chair transfer with Modified [place] using Single-point Cane   5. Gait  x 200 feet with Supervision using Single-point Cane .     Outcome: Ongoing, Progressing   Pt continues to progress towards goals.

## 2020-10-20 NOTE — PROGRESS NOTES
Non urgent Consult called in today after I have rounded.  Will see pt in AM.   Pt w multiple comorbidities (PE, cirrhosis, ATN, anemia) w neck and LUE pain.     MRI C spine reviewed: C56, C67 broad-based HNPw mild cord compression;     Rec:  IV decadron for 6 doses;  PT/OT ;   ????WOULD PATIENT ABLE to be cleared for a 2 level ACDF in regards to his platelets and coagulation/liver function?  If so, this unfortunately cannot be performed at [facility] due to current issues w microscope and drill.  Would need transfer to another facility if he is deemed medically cleared.

## 2020-10-20 NOTE — PT/OT/SLP PROGRESS
Physical Therapy Treatment    Patient Name:  Irvin iDaz   MRN:  [id number]    Recommendations:     Discharge Recommendations:  home, outpatient PT   Discharge Equipment Recommendations: none   Barriers to discharge: None    Assessment:     Irvin Diaz is a 45 y.o. male admitted with a medical diagnosis of Tachycardia.  He presents with the following impairments/functional limitations:  weakness, impaired functional mobilty, gait instability, impaired balance, decreased upper extremity function, decreased lower extremity function, pain, decreased safety awareness, decreased ROM.    Patient presents resting in bed with HOB elevated; agreeable to PT treatment. Patient progressing with gait and mobility, increasing gait distance without LOB or SOB noted. Patient used SPC to ambulate. Upper neck/arm pain noted during treatment. Continue with PT and POC.    Rehab Prognosis: Good; patient would benefit from acute skilled PT services to address these deficits and reach maximum level of function.    Recent Surgery: * No surgery found *      Plan:     During this hospitalization, patient to be seen 5 x/week to address the identified rehab impairments via gait training, therapeutic activities, therapeutic exercises and progress toward the following goals:    · Plan of Care Expires:  11/19/20    Subjective     Chief Complaint: Neck/LUE pain  Patient/Family Comments/goals:   Pain/Comfort:  · Pain Rating 1: (Pt did not quantify)  · Location - Side 1: Left  · Location - Orientation 1: upper  · Location 1: neck  · Pain Addressed 1: Reposition, Distraction, Cessation of Activity      Objective:     Communicated with RN prior to session.  Patient found HOB elevated with telemetry upon PT entry to room.     General Precautions: Standard, fall   Orthopedic Precautions:    Braces:       Functional Mobility:  · Bed Mobility:     · Supine to Sit: supervision  · Sit to Supine: supervision  · Transfers:     · Sit to Stand:   supervision with straight cane  · Gait: 60ft with SPC and SBA      AM-PAC 6 CLICK MOBILITY          Therapeutic Activities and Exercises:   bed mobility; sitting EOB for trunk control and midline orientation; sit <> stands; transfer training    Patient left HOB elevated with all lines intact and call button in reach..    GOALS:   Multidisciplinary Problems     Physical Therapy Goals        Problem: Physical Therapy Goal    Goal Priority Disciplines Outcome Goal Variances Interventions   Physical Therapy Goal     PT, PT/OT Ongoing, Progressing     Description: Goals to be met by: discharge       Patient will increase functional independence with mobility by performin. Supine to sit with Modified [place]  2. Sit to supine with Modified [place]  3. Sit to stand transfer with Modified [place]  4. Bed to chair transfer with Modified [place] using Single-point Cane   5. Gait  x 200 feet with Supervision using Single-point Cane .                      Time Tracking:     PT Received On: 10/21/20  PT Start Time: 1045     PT Stop Time: 1053  PT Total Time (min): 8 min     Billable Minutes: Gait Training 8    Treatment Type: Treatment  PT/PTA: PTA     PTA Visit Number: 1     Mechelle Quan PTA  10/20/2020

## 2020-10-21 VITALS
DIASTOLIC BLOOD PRESSURE: 63 MMHG | HEART RATE: 84 BPM | OXYGEN SATURATION: 96 % | WEIGHT: 181.88 LBS | BODY MASS INDEX: 24.63 KG/M2 | TEMPERATURE: 99 F | RESPIRATION RATE: 14 BRPM | SYSTOLIC BLOOD PRESSURE: 109 MMHG | HEIGHT: 72 IN

## 2020-10-21 PROBLEM — R00.0 TACHYCARDIA: Status: RESOLVED | Noted: 2020-10-18 | Resolved: 2020-10-21

## 2020-10-21 LAB
ALBUMIN SERPL BCP-MCNC: 3.3 G/DL (ref 3.5–5.2)
ALP SERPL-CCNC: 99 U/L (ref 55–135)
ALT SERPL W/O P-5'-P-CCNC: 41 U/L (ref 10–44)
ANION GAP SERPL CALC-SCNC: 11 MMOL/L (ref 8–16)
AST SERPL-CCNC: 62 U/L (ref 10–40)
BASOPHILS # BLD AUTO: 0.02 K/UL (ref 0–0.2)
BASOPHILS NFR BLD: 0.1 % (ref 0–1.9)
BILIRUB SERPL-MCNC: 3.6 MG/DL (ref 0.1–1)
BUN SERPL-MCNC: 24 MG/DL (ref 6–20)
CALCIUM SERPL-MCNC: 9.7 MG/DL (ref 8.7–10.5)
CHLORIDE SERPL-SCNC: 101 MMOL/L (ref 95–110)
CO2 SERPL-SCNC: 24 MMOL/L (ref 23–29)
CREAT SERPL-MCNC: 1.3 MG/DL (ref 0.5–1.4)
DIFFERENTIAL METHOD: ABNORMAL
EOSINOPHIL # BLD AUTO: 0 K/UL (ref 0–0.5)
EOSINOPHIL NFR BLD: 0 % (ref 0–8)
ERYTHROCYTE [DISTWIDTH] IN BLOOD BY AUTOMATED COUNT: 14.4 % (ref 11.5–14.5)
EST. GFR  (AFRICAN AMERICAN): >60 ML/MIN/1.73 M^2
EST. GFR  (NON AFRICAN AMERICAN): >60 ML/MIN/1.73 M^2
GLUCOSE SERPL-MCNC: 152 MG/DL (ref 70–110)
HCT VFR BLD AUTO: 33.9 % (ref 40–54)
HGB BLD-MCNC: 11.6 G/DL (ref 14–18)
IMM GRANULOCYTES # BLD AUTO: 0.07 K/UL (ref 0–0.04)
IMM GRANULOCYTES NFR BLD AUTO: 0.5 % (ref 0–0.5)
LYMPHOCYTES # BLD AUTO: 1.2 K/UL (ref 1–4.8)
LYMPHOCYTES NFR BLD: 8.2 % (ref 18–48)
MCH RBC QN AUTO: 35.9 PG (ref 27–31)
MCHC RBC AUTO-ENTMCNC: 34.2 G/DL (ref 32–36)
MCV RBC AUTO: 105 FL (ref 82–98)
MONOCYTES # BLD AUTO: 0.8 K/UL (ref 0.3–1)
MONOCYTES NFR BLD: 5.5 % (ref 4–15)
NEUTROPHILS # BLD AUTO: 12.5 K/UL (ref 1.8–7.7)
NEUTROPHILS NFR BLD: 85.7 % (ref 38–73)
NRBC BLD-RTO: 0 /100 WBC
PLATELET # BLD AUTO: 100 K/UL (ref 150–350)
PMV BLD AUTO: 11.8 FL (ref 9.2–12.9)
POTASSIUM SERPL-SCNC: 4.3 MMOL/L (ref 3.5–5.1)
PROT SERPL-MCNC: 7.7 G/DL (ref 6–8.4)
RBC # BLD AUTO: 3.23 M/UL (ref 4.6–6.2)
SODIUM SERPL-SCNC: 136 MMOL/L (ref 136–145)
WBC # BLD AUTO: 14.6 K/UL (ref 3.9–12.7)

## 2020-10-21 PROCEDURE — 85025 COMPLETE CBC W/AUTO DIFF WBC: CPT

## 2020-10-21 PROCEDURE — 25000003 PHARM REV CODE 250: Performed by: NURSE PRACTITIONER

## 2020-10-21 PROCEDURE — 97116 GAIT TRAINING THERAPY: CPT | Mod: CQ

## 2020-10-21 PROCEDURE — 36415 COLL VENOUS BLD VENIPUNCTURE: CPT

## 2020-10-21 PROCEDURE — 80053 COMPREHEN METABOLIC PANEL: CPT

## 2020-10-21 PROCEDURE — 25000003 PHARM REV CODE 250: Performed by: HOSPITALIST

## 2020-10-21 PROCEDURE — 25000003 PHARM REV CODE 250: Performed by: INTERNAL MEDICINE

## 2020-10-21 PROCEDURE — 63600175 PHARM REV CODE 636 W HCPCS: Performed by: INTERNAL MEDICINE

## 2020-10-21 RX ORDER — METHYLPREDNISOLONE 4 MG/1
TABLET ORAL
Qty: 1 PACKAGE | Refills: 0 | Status: ON HOLD | OUTPATIENT
Start: 2020-10-21 | End: 2020-10-27 | Stop reason: HOSPADM

## 2020-10-21 RX ORDER — GABAPENTIN 300 MG/1
300 CAPSULE ORAL 3 TIMES DAILY
Qty: 90 CAPSULE | Refills: 1 | Status: SHIPPED | OUTPATIENT
Start: 2020-10-21 | End: 2021-05-03

## 2020-10-21 RX ADMIN — DEXAMETHASONE SODIUM PHOSPHATE 4 MG: 4 INJECTION, SOLUTION INTRA-ARTICULAR; INTRALESIONAL; INTRAMUSCULAR; INTRAVENOUS; SOFT TISSUE at 05:10

## 2020-10-21 RX ADMIN — GABAPENTIN 300 MG: 300 CAPSULE ORAL at 09:10

## 2020-10-21 RX ADMIN — CARVEDILOL 6.25 MG: 6.25 TABLET, FILM COATED ORAL at 07:10

## 2020-10-21 RX ADMIN — DEXAMETHASONE SODIUM PHOSPHATE 4 MG: 4 INJECTION, SOLUTION INTRA-ARTICULAR; INTRALESIONAL; INTRAMUSCULAR; INTRAVENOUS; SOFT TISSUE at 12:10

## 2020-10-21 RX ADMIN — PANTOPRAZOLE SODIUM 40 MG: 40 TABLET, DELAYED RELEASE ORAL at 09:10

## 2020-10-21 RX ADMIN — SUCRALFATE 1 G: 1 TABLET ORAL at 09:10

## 2020-10-21 RX ADMIN — SUCRALFATE 1 G: 1 TABLET ORAL at 12:10

## 2020-10-21 NOTE — PLAN OF CARE
Problem: Physical Therapy Goal  Goal: Physical Therapy Goal  Description: Goals to be met by: discharge       Patient will increase functional independence with mobility by performin. Supine to sit with Modified [place]  2. Sit to supine with Modified [place]  3. Sit to stand transfer with Modified [place]  4. Bed to chair transfer with Modified [place] using Single-point Cane   5. Gait  x 200 feet with Supervision using Single-point Cane .     Outcome: Ongoing, Progressing   Pt continues to progress towards goals.

## 2020-10-21 NOTE — ASSESSMENT & PLAN NOTE
Per Neurosurgery note, MRI with possible cord involvement  Steroids  Formal Neurosurgery eval tomorrow  PT/OT

## 2020-10-21 NOTE — CONSULTS
[facility]  Neurosurgery  Consult Note    Consults  Subjective: LUE pain/Numbness/weakness     Chief Complaint/Reason for Admission: neck pain/ LUE pain and numbness    History of Present Illness:  Patient is a very pleasant 45-year-old gentleman who has a history of cirrhosis/liver disease, macrocytic anemia, alcohol abuse, thrombocytopenia and pulmonary embolism who presented on 10/18/2020 with complaint of left upper extremity pain and numbness for the last 2 or 3 days.  Patient denies trauma.  Patient complains of neck pain with radiation down the left arm into his hand with numbness and tingling into the fingers.  Patient states he may have slept wrong.  Patient denies prior history of neck issues.  Patient denies lower extremity weakness numbness tingling.  Patient denies bowel or bladder changes.  The patient had recent banding for esophageal varices about a month ago.  His platelets are around 84.  He had no concerning findings on chest x-ray.  No chest pain or shortness of breath.  He has had some improvement since admission with IV steroids and gabapentin.  Patient is not keen on the idea of surgery.  Patient is amenable to physical therapy.  Patient denies history of cervical spine surgery.    PTA Medications   Medication Sig    amLODIPine (NORVASC) 5 MG tablet Take 2 tablets (10 mg total) by mouth once daily.    carvediloL (COREG) 6.25 MG tablet Take 12.5 mg by mouth 2 (two) times daily with meals.     carvediloL (COREG) 6.25 MG tablet Take 6.25 mg by mouth 2 (two) times daily with meals.    NIFEdipine (ADALAT CC) 60 MG TbSR Take 30 mg by mouth once daily.    pantoprazole (PROTONIX) 20 MG tablet Take 20 mg by mouth once daily.    propranoloL (INDERAL) 10 MG tablet Take 10 mg by mouth 2 (two) times daily.    sucralfate (CARAFATE) 1 gram tablet Take 1 g by mouth 4 (four) times daily.    traMADoL (ULTRAM) 50 mg tablet Take 50 mg by mouth every 8 (eight) hours as needed for Pain.     ondansetron (ZOFRAN ODT) 4 MG TbDL Take 1 tablet (4 mg total) by mouth every 8 (eight) hours as needed (Nausea and Vomiting).    oxyCODONE-acetaminophen (PERCOCET) 5-325 mg per tablet Take 1 tablet by mouth every 6 (six) hours as needed for Pain.    polyethylene glycol (GLYCOLAX) 17 gram PwPk Take 17 g by mouth 2 (two) times daily.       Review of patient's allergies indicates:   Allergen Reactions    Ciprofloxacin hcl Hallucinations    Morphine Itching       Past Medical History:   Diagnosis Date    Alcoholic cirrhosis of liver     Arthritis     ATN (acute tubular necrosis)     Diverticulitis 2020    GI bleed     Hip arthritis     Left    Hypertension     Macrocytic anemia     Pulmonary embolism 2018    Unprovoked DVT.  Stop Coumadin due to GIB    Thrombocytopenia      Past Surgical History:   Procedure Laterality Date    ANKLE SURGERY      COLON SURGERY      COLONOSCOPY  2019    [facility]    ESOPHAGOGASTRODUODENOSCOPY N/A 2019    Procedure: EGD (ESOPHAGOGASTRODUODENOSCOPY);  Surgeon: Brian Trivedi MD;  Location: [facility];  Service: Endoscopy;  Laterality: N/A;    ESOPHAGOGASTRODUODENOSCOPY N/A 2020    Procedure: EGD (ESOPHAGOGASTRODUODENOSCOPY);  Surgeon: Donn Acuna MD;  Location: [facility];  Service: Endoscopy;  Laterality: N/A;    HERNIA REPAIR Left     Inguinal     Family History     Problem Relation (Age of Onset)    Bladder Cancer Father    Breast cancer Mother    Hypertension Mother, Father    Prostate cancer Father        Tobacco Use    Smoking status: Former Smoker     Quit date: 2000     Years since quittin.8    Smokeless tobacco: Never Used    Tobacco comment: quit 20 years ago   Substance and Sexual Activity    Alcohol use: Not Currently     Comment: freq    Drug use: Not Currently     Types: Marijuana    Sexual activity: Yes     Comment: occ     Review of Systems  Objective:     Weight: 82.5 kg (181 lb 14.1 oz)  Body mass index is 24.67  kg/m².  Vital Signs (Most Recent):  Temp: 98.7 °F (37.1 °C) (10/21/20 0319)  Pulse: 88 (10/21/20 0319)  Resp: 20 (10/21/20 0319)  BP: (!) 108/59 (10/21/20 0319)  SpO2: 98 % (10/21/20 0319) Vital Signs (24h Range):  Temp:  [98.4 °F (36.9 °C)-99 °F (37.2 °C)] 98.7 °F (37.1 °C)  Pulse:  [78-96] 88  Resp:  [18-20] 20  SpO2:  [97 %-99 %] 98 %  BP: (108-122)/(58-67) 108/59                          Neurosurgery Physical Exam awake alert oriented x3; no acute distress; very minimal left  weakness of 4+ out of 5 otherwise motor 5/5; positive Spurling sign on the left; positive Chitra sign on the right; decreased sensation left upper extremity and no clear distribution; patient appears nontoxic, cranial nerves 2-12 are intact, gait was not assessed; pain with cervical extension    Significant Labs:  Recent Labs   Lab 10/21/20  0416   *      K 4.3      CO2 24   BUN 24*   CREATININE 1.3   CALCIUM 9.7     Recent Labs   Lab 10/21/20  0416   WBC 14.60*   HGB 11.6*   HCT 33.9*   *     No results for input(s): LABPT, INR, APTT in the last 48 hours.  Microbiology Results (last 7 days)     ** No results found for the last 168 hours. **          Significant Diagnostics:  MRI cervical spine 10/18/2020:  Degenerative disc disease with disc herniation at C5-6 and C6-7; there is bilateral neural foraminal narrowing at both levels  MRI brain dated 10/18/2020:  Within normal limits      Assessment/Plan:  Cervicalgia with left upper extremity radiculopathy which is improving with gabapentin and steroids.  I recommend outpatient physical therapy with cervical traction specifically; he can do this in [place] or here locally; follow-up with me in 3-4 weeks; I would not rush patient into cervical fusion surgery based on his recent procedures in comorbidities.  Ideally his coagulation function should be normal; recommend steroid taper and sent home on a Medrol Dosepak with gabapentin as well     Active  Diagnoses:    Diagnosis Date Noted POA    PRINCIPAL PROBLEM:  Tachycardia [R00.0] 10/18/2020 Yes    Arm pain [M79.603] 10/20/2020 Yes    CKD (chronic kidney disease) stage 3, GFR 30-59 ml/min [N18.30] 04/08/2020 Yes     Chronic    Essential hypertension [I10]  Yes     Chronic    Alcoholic cirrhosis of liver [K70.30]  Yes     Chronic    Thrombocytopenia [D69.6] 05/11/2019 Yes      Problems Resolved During this Admission:       Thank you for your consult.     Brianda Yadav MD  Neurosurgery  [facility]

## 2020-10-21 NOTE — PROGRESS NOTES
[facility] Medicine  Progress Note    Patient Name: Irvin Diaz  MRN: [id number]  Patient Class: IP- Inpatient   Admission Date: 10/18/2020  Length of Stay: 0 days  Attending Physician: Leanne Park MD  Primary Care Provider: [facility]        Subjective:     Principal Problem:Tachycardia        HPI:  No notes on file    Overview/Hospital Course:  Patient admitted with left arm pain, weakness and decreased sensation.  Decreased hand  seen on exam. He was started on steroids and MRI obtained.  MRI with findings concerning for narrowing.  Neurosurgery consulted and per their note and their read, possible cord involvement.  Plan to evaluate 10/21.  Continuing steroids and patient does report some improvement in pain and function. Has Cirrhosis and chronic thrombocytopenia which Neurosurgery has expressed concern about if he would require a surgery.  No current bleeding.    Interval History:left arm pain and weakness POA- over few day duration, persistent, moderately severe. Pain has improved with steroids.      Review of Systems   Constitutional: Negative.    HENT: Negative.    Eyes: Negative.    Respiratory: Negative.    Cardiovascular: Negative.    Gastrointestinal: Negative.    Endocrine: Negative.    Genitourinary: Negative.    Musculoskeletal: Negative.         Left arm pain, weakness and decreased sensation   Skin: Negative.    Allergic/Immunologic: Negative.    Neurological: Negative.    Hematological: Negative.    Psychiatric/Behavioral: Negative.      Objective:     Vital Signs (Most Recent):  Temp: 98.9 °F (37.2 °C) (10/20/20 2321)  Pulse: 96 (10/20/20 2321)  Resp: 18 (10/20/20 2321)  BP: 122/64 (10/20/20 2321)  SpO2: 99 % (10/20/20 2321) Vital Signs (24h Range):  Temp:  [98.4 °F (36.9 °C)-99 °F (37.2 °C)] 98.9 °F (37.2 °C)  Pulse:  [78-96] 96  Resp:  [18-20] 18  SpO2:  [97 %-99 %] 99 %  BP: (110-122)/(58-67) 122/64     Weight: 80.3 kg (177 lb  0.5 oz)  Body mass index is 24.01 kg/m².    Intake/Output Summary (Last 24 hours) at 10/20/2020 2344  Last data filed at 10/20/2020 1737  Gross per 24 hour   Intake 480 ml   Output --   Net 480 ml      Physical Exam  Vitals signs and nursing note reviewed.   Constitutional:       General: He is not in acute distress.     Appearance: He is well-developed.   HENT:      Head: Normocephalic and atraumatic.   Eyes:      Pupils: Pupils are equal, round, and reactive to light.   Neck:      Musculoskeletal: Normal range of motion.   Cardiovascular:      Rate and Rhythm: Regular rhythm.      Heart sounds: No murmur.   Pulmonary:      Effort: Pulmonary effort is normal.      Breath sounds: Normal breath sounds. No wheezing.   Abdominal:      General: There is no distension.      Palpations: Abdomen is soft.      Tenderness: There is no abdominal tenderness. There is no guarding or rebound.   Musculoskeletal: Normal range of motion.   Skin:     Findings: No rash.   Neurological:      Mental Status: He is alert and oriented to person, place, and time.      Sensory: Sensory deficit present.      Comments: Decreased left hand  compared to right         Significant Labs:   CBC:   Recent Labs   Lab 10/19/20  0157   WBC 7.78   HGB 11.2*   HCT 32.9*   PLT 73*     CMP:   Recent Labs   Lab 10/19/20  0157   *   K 3.4*   CL 99   CO2 26   *   BUN 13   CREATININE 1.0   CALCIUM 8.9   PROT 7.9   ALBUMIN 3.4*   BILITOT 4.3*   ALKPHOS 94   *   ALT 53*   ANIONGAP 10   EGFRNONAA >60.0       Significant Imaging: I have reviewed all pertinent imaging results/findings within the past 24 hours.      Assessment/Plan:      Arm pain  Per Neurosurgery note, MRI with possible cord involvement  Steroids  Formal Neurosurgery eval tomorrow  PT/OT        VTE Risk Mitigation (From admission, onward)         Ordered     IP VTE LOW RISK PATIENT  Once      10/18/20 1641     Place sequential compression device  Until discontinued       10/18/20 1641                Discharge Planning   RADHA:      Code Status: Full Code   Is the patient medically ready for discharge?:     Reason for patient still in hospital (select all that apply): Treatment  Discharge Plan A: Home with family                  Leanne Park MD  Department of Hospital Medicine   [facility]

## 2020-10-21 NOTE — PLAN OF CARE
Problem: Adult Inpatient Plan of Care  Goal: Plan of Care Review  Outcome: Ongoing, Progressing  Goal: Patient-Specific Goal (Individualization)  Outcome: Ongoing, Progressing  Goal: Absence of Hospital-Acquired Illness or Injury  Outcome: Ongoing, Progressing  Goal: Optimal Comfort and Wellbeing  Outcome: Ongoing, Progressing  Goal: Readiness for Transition of Care  Outcome: Ongoing, Progressing  Goal: Rounds/Family Conference  Outcome: Ongoing, Progressing     Problem: Electrolyte Imbalance (Acute Kidney Injury/Impairment)  Goal: Serum Electrolyte Balance  Outcome: Ongoing, Progressing     Problem: Fluid Imbalance (Acute Kidney Injury/Impairment)  Goal: Optimal Fluid Balance  Outcome: Ongoing, Progressing     Problem: Hematologic Alteration (Acute Kidney Injury/Impairment)  Goal: Hemoglobin, Hematocrit and Platelets Within Normal Range  Outcome: Ongoing, Progressing     Problem: Oral Intake Inadequate (Acute Kidney Injury/Impairment)  Goal: Optimal Nutrition Intake  Outcome: Ongoing, Progressing     Problem: Renal Function Impairment (Acute Kidney Injury/Impairment)  Goal: Effective Renal Function  Outcome: Ongoing, Progressing     Problem: Fall Injury Risk  Goal: Absence of Fall and Fall-Related Injury  Outcome: Ongoing, Progressing   Pt continue IV steroids, no acute distress noted

## 2020-10-21 NOTE — PLAN OF CARE
10/21/20 1354   Final Note   Assessment Type Final Discharge Note   Anticipated Discharge Disposition Home   Patient sent home with outpatient PT for cervical traction. Faxed order to scheduling and Outpatient PT/OT.

## 2020-10-21 NOTE — SUBJECTIVE & OBJECTIVE
Interval History:left arm pain and weakness POA- over few day duration, persistent, moderately severe. Pain has improved with steroids.      Review of Systems   Constitutional: Negative.    HENT: Negative.    Eyes: Negative.    Respiratory: Negative.    Cardiovascular: Negative.    Gastrointestinal: Negative.    Endocrine: Negative.    Genitourinary: Negative.    Musculoskeletal: Negative.         Left arm pain, weakness and decreased sensation   Skin: Negative.    Allergic/Immunologic: Negative.    Neurological: Negative.    Hematological: Negative.    Psychiatric/Behavioral: Negative.      Objective:     Vital Signs (Most Recent):  Temp: 98.9 °F (37.2 °C) (10/20/20 2321)  Pulse: 96 (10/20/20 2321)  Resp: 18 (10/20/20 2321)  BP: 122/64 (10/20/20 2321)  SpO2: 99 % (10/20/20 2321) Vital Signs (24h Range):  Temp:  [98.4 °F (36.9 °C)-99 °F (37.2 °C)] 98.9 °F (37.2 °C)  Pulse:  [78-96] 96  Resp:  [18-20] 18  SpO2:  [97 %-99 %] 99 %  BP: (110-122)/(58-67) 122/64     Weight: 80.3 kg (177 lb 0.5 oz)  Body mass index is 24.01 kg/m².    Intake/Output Summary (Last 24 hours) at 10/20/2020 2343  Last data filed at 10/20/2020 1737  Gross per 24 hour   Intake 480 ml   Output --   Net 480 ml      Physical Exam  Vitals signs and nursing note reviewed.   Constitutional:       General: He is not in acute distress.     Appearance: He is well-developed.   HENT:      Head: Normocephalic and atraumatic.   Eyes:      Pupils: Pupils are equal, round, and reactive to light.   Neck:      Musculoskeletal: Normal range of motion.   Cardiovascular:      Rate and Rhythm: Regular rhythm.      Heart sounds: No murmur.   Pulmonary:      Effort: Pulmonary effort is normal.      Breath sounds: Normal breath sounds. No wheezing.   Abdominal:      General: There is no distension.      Palpations: Abdomen is soft.      Tenderness: There is no abdominal tenderness. There is no guarding or rebound.   Musculoskeletal: Normal range of motion.   Skin:      Findings: No rash.   Neurological:      Mental Status: He is alert and oriented to person, place, and time.      Sensory: Sensory deficit present.      Comments: Decreased left hand  compared to right         Significant Labs:   CBC:   Recent Labs   Lab 10/19/20  0157   WBC 7.78   HGB 11.2*   HCT 32.9*   PLT 73*     CMP:   Recent Labs   Lab 10/19/20  0157   *   K 3.4*   CL 99   CO2 26   *   BUN 13   CREATININE 1.0   CALCIUM 8.9   PROT 7.9   ALBUMIN 3.4*   BILITOT 4.3*   ALKPHOS 94   *   ALT 53*   ANIONGAP 10   EGFRNONAA >60.0       Significant Imaging: I have reviewed all pertinent imaging results/findings within the past 24 hours.

## 2020-10-21 NOTE — HOSPITAL COURSE
Patient admitted with left arm pain, weakness and decreased sensation.  Decreased hand  seen on exam. He was started on steroids and MRI obtained. Neurosurgery evaluated the patient and thought Cervicalgia with left upper extremity radiculopathy which is improving with gabapentin and steroids. He is not a great surgical candidate given his cirrhosis, chronic thrombocytopenia and other chronic coagulation issues as a result.  Patient will be discharged on medrol dose pack and gabapentin.  CM consulted for outpatient PT with cervical traction.  Patient will follow up with neurosurgery in office.  Examined on day of discharge and NAD, minimal decreased left hand  compared to right.  Return precautions given.

## 2020-10-21 NOTE — PT/OT/SLP PROGRESS
Physical Therapy Treatment    Patient Name:  Ivrin Diaz   MRN:  [id number]    Recommendations:     Discharge Recommendations:  home, outpatient PT   Discharge Equipment Recommendations: none   Barriers to discharge: None    Assessment:     Irvin Diaz is a 45 y.o. male admitted with a medical diagnosis of Tachycardia.  He presents with the following impairments/functional limitations:  weakness, impaired functional mobilty, gait instability, impaired balance, decreased upper extremity function, decreased lower extremity function, pain, decreased safety awareness, decreased ROM.    Patient presents resting in bed with HOB elevated; agreeable to PT treatment. Patient reports neck/arm pain is better today however pain did increase with ambulation. Patient progressing with gait and mobility today, increasing gait distance without LOB or SOB noted. Continue with PT and POC.    Rehab Prognosis: Good; patient would benefit from acute skilled PT services to address these deficits and reach maximum level of function.    Recent Surgery: * No surgery found *      Plan:     During this hospitalization, patient to be seen 5 x/week to address the identified rehab impairments via gait training, therapeutic activities, therapeutic exercises and progress toward the following goals:    · Plan of Care Expires:  11/19/20    Subjective     Chief Complaint: Neck/upper arm pain (L sided)  Patient/Family Comments/goals:   Pain/Comfort:  · Pain Rating 1: (Pt did not quantify)  · Location - Side 1: Left  · Location - Orientation 1: upper  · Location 1: neck  · Pain Addressed 1: Reposition, Distraction, Cessation of Activity      Objective:     Communicated with RN prior to session.  Patient found HOB elevated with telemetry upon PT entry to room.     General Precautions: Standard, fall   Orthopedic Precautions:    Braces:       Functional Mobility:  · Bed Mobility:     · Supine to Sit: modified independence  · Sit to Supine:  modified independence  · Transfers:     · Sit to Stand:  modified independence with straight cane  · Gait: 100ft with SC and SBA      AM-PAC 6 CLICK MOBILITY          Therapeutic Activities and Exercises:   bed mobility; sitting EOB for trunk control and midline orientation; sit <> stands; transfer training    Patient left HOB elevated with all lines intact and call button in reach..    GOALS:   Multidisciplinary Problems     Physical Therapy Goals        Problem: Physical Therapy Goal    Goal Priority Disciplines Outcome Goal Variances Interventions   Physical Therapy Goal     PT, PT/OT Ongoing, Progressing     Description: Goals to be met by: discharge       Patient will increase functional independence with mobility by performin. Supine to sit with Modified [place]  2. Sit to supine with Modified [place]  3. Sit to stand transfer with Modified [place]  4. Bed to chair transfer with Modified [place] using Single-point Cane   5. Gait  x 200 feet with Supervision using Single-point Cane .                      Time Tracking:     PT Received On: 10/21/20  PT Start Time: 1013     PT Stop Time: 1023  PT Total Time (min): 10 min     Billable Minutes: Gait Training 10    Treatment Type: Treatment  PT/PTA: PTA     PTA Visit Number: 2     Mechelle Quan PTA  10/21/2020

## 2020-10-22 NOTE — DISCHARGE SUMMARY
[facility]  Discharge Summary      Patient Name: Irvin Diaz  MRN: [id number]  Admission Date: 10/18/2020  Hospital Length of Stay: 1 days  Discharge Date and Time: 10/21/2020  2:30 PM  Attending Physician: No att. providers found   Discharging Provider: Leanne Park MD  Primary Care Provider: [facility]      HPI:   No notes on file    * No surgery found *      Hospital Course:   Patient admitted with left arm pain, weakness and decreased sensation.  Decreased hand  seen on exam. He was started on steroids and MRI obtained. Neurosurgery evaluated the patient and thought Cervicalgia with left upper extremity radiculopathy which is improving with gabapentin and steroids. He is not a great surgical candidate given his cirrhosis, chronic thrombocytopenia and other chronic coagulation issues as a result.  Patient will be discharged on medrol dose pack and gabapentin.  CM consulted for outpatient PT with cervical traction.  Patient will follow up with neurosurgery in office.  Examined on day of discharge and NAD, minimal decreased left hand  compared to right.  Return precautions given.     Consults:     No new Assessment & Plan notes have been filed under this hospital service since the last note was generated.  Service: Hospital Medicine    Final Active Diagnoses:    Diagnosis Date Noted POA    Arm pain [M79.603] 10/20/2020 Yes    CKD (chronic kidney disease) stage 3, GFR 30-59 ml/min [N18.30] 04/08/2020 Yes     Chronic    Essential hypertension [I10]  Yes     Chronic    Alcoholic cirrhosis of liver [K70.30]  Yes     Chronic    Thrombocytopenia [D69.6] 05/11/2019 Yes      Problems Resolved During this Admission:    Diagnosis Date Noted Date Resolved POA    PRINCIPAL PROBLEM:  Tachycardia [R00.0] 10/18/2020 10/21/2020 Yes       Discharged Condition: good    Disposition: Home or Self Care    Follow Up:  Follow-up Information     [place]  [facility] In 2 weeks.    Specialties: Internal Medicine, Family Medicine  Why: post hospital discharge follow up  Contact information:  [address]  [facility] [id number] [phone]             Brianda Yadav MD. Call in 3 weeks.    Specialty: Neurosurgery  Why: post hospital discharge follow up  Contact information:  [id number]  [place] [id number] [phone]                 Patient Instructions:      Ambulatory referral/consult to Physical/Occupational Therapy   Standing Status: Future   Referral Priority: Routine Referral Type: Physical Medicine   Referral Reason: Specialty Services Required   Number of Visits Requested: 1     Diet Cardiac     Notify your health care provider if you experience any of the following:  temperature >100.4     Notify your health care provider if you experience any of the following:  persistent nausea and vomiting or diarrhea     Notify your health care provider if you experience any of the following:  severe uncontrolled pain     Activity as tolerated       Significant Diagnostic Studies: Labs:   CMP   Recent Labs   Lab 10/21/20  0416      K 4.3      CO2 24   *   BUN 24*   CREATININE 1.3   CALCIUM 9.7   PROT 7.7   ALBUMIN 3.3*   BILITOT 3.6*   ALKPHOS 99   AST 62*   ALT 41   ANIONGAP 11   ESTGFRAFRICA >60.0   EGFRNONAA >60.0       Pending Diagnostic Studies:     None         Medications:  Reconciled Home Medications:      Medication List      START taking these medications    gabapentin 300 MG capsule  Commonly known as: NEURONTIN  Take 1 capsule (300 mg total) by mouth 3 (three) times daily.     methylPREDNISolone 4 mg tablet  Commonly known as: MEDROL DOSEPACK  use as directed        CHANGE how you take these medications    carvediloL 6.25 MG tablet  Commonly known as: COREG  Take 12.5 mg by mouth 2 (two) times daily with meals.  What changed: Another medication with the same name was removed. Continue taking this  medication, and follow the directions you see here.        CONTINUE taking these medications    amLODIPine 5 MG tablet  Commonly known as: NORVASC  Take 2 tablets (10 mg total) by mouth once daily.     NIFEdipine 60 MG Tbsr  Commonly known as: ADALAT CC  Take 30 mg by mouth once daily.     ondansetron 4 MG Tbdl  Commonly known as: ZOFRAN ODT  Take 1 tablet (4 mg total) by mouth every 8 (eight) hours as needed (Nausea and Vomiting).     oxyCODONE-acetaminophen 5-325 mg per tablet  Commonly known as: PERCOCET  Take 1 tablet by mouth every 6 (six) hours as needed for Pain.     pantoprazole 20 MG tablet  Commonly known as: PROTONIX  Take 20 mg by mouth once daily.     polyethylene glycol 17 gram Pwpk  Commonly known as: GLYCOLAX  Take 17 g by mouth 2 (two) times daily.     propranoloL 10 MG tablet  Commonly known as: INDERAL  Take 10 mg by mouth 2 (two) times daily.     sucralfate 1 gram tablet  Commonly known as: CARAFATE  Take 1 g by mouth 4 (four) times daily.     traMADoL 50 mg tablet  Commonly known as: ULTRAM  Take 50 mg by mouth every 8 (eight) hours as needed for Pain.            Indwelling Lines/Drains at time of discharge:   Lines/Drains/Airways     None                 Time spent on the discharge of patient: 31 minutes  Patient was seen and examined on the date of discharge and determined to be suitable for discharge.         Leanne Park MD  Department of Hospital Medicine  [facility]

## 2020-10-26 PROBLEM — I50.9 CHF (CONGESTIVE HEART FAILURE): Status: ACTIVE | Noted: 2020-10-26

## 2020-10-27 PROBLEM — I50.9 CHF (CONGESTIVE HEART FAILURE): Status: RESOLVED | Noted: 2020-10-26 | Resolved: 2020-10-27

## 2020-10-27 PROBLEM — R07.9 CHEST PAIN: Status: ACTIVE | Noted: 2020-10-27

## 2020-11-06 ENCOUNTER — DOCUMENTATION ONLY (OUTPATIENT)
Dept: REHABILITATION | Facility: OTHER | Age: 45
End: 2020-11-06

## 2020-11-06 NOTE — PROGRESS NOTES
Pt failed to appear for physical therapy evaluation without notification today.      Myrna Powers, PT

## 2020-11-07 ENCOUNTER — HOSPITAL ENCOUNTER (OUTPATIENT)
Facility: HOSPITAL | Age: 45
Discharge: HOME | End: 2020-11-08
Attending: EMERGENCY MEDICINE | Admitting: INTERNAL MEDICINE
Payer: MEDICAID

## 2020-11-07 DIAGNOSIS — R07.9 CHEST PAIN, UNSPECIFIED TYPE: Primary | ICD-10-CM

## 2020-11-07 DIAGNOSIS — R07.9 CHEST PAIN: ICD-10-CM

## 2020-11-07 DIAGNOSIS — R06.02 SHORTNESS OF BREATH: ICD-10-CM

## 2020-11-07 LAB
ALBUMIN SERPL BCP-MCNC: 3.4 G/DL (ref 3.5–5.2)
ALP SERPL-CCNC: 100 U/L (ref 55–135)
ALT SERPL W/O P-5'-P-CCNC: 43 U/L (ref 10–44)
ANION GAP SERPL CALC-SCNC: 12 MMOL/L (ref 8–16)
AST SERPL-CCNC: 83 U/L (ref 10–40)
BASOPHILS # BLD AUTO: 0.06 K/UL (ref 0–0.2)
BASOPHILS NFR BLD: 0.7 % (ref 0–1.9)
BILIRUB SERPL-MCNC: 3.9 MG/DL (ref 0.1–1)
BNP SERPL-MCNC: 54 PG/ML (ref 0–99)
BNP SERPL-MCNC: 54 PG/ML (ref 0–99)
BUN SERPL-MCNC: 13 MG/DL (ref 6–20)
CALCIUM SERPL-MCNC: 8.6 MG/DL (ref 8.7–10.5)
CHLORIDE SERPL-SCNC: 103 MMOL/L (ref 95–110)
CO2 SERPL-SCNC: 26 MMOL/L (ref 23–29)
CREAT SERPL-MCNC: 1.2 MG/DL (ref 0.5–1.4)
DIFFERENTIAL METHOD: ABNORMAL
EOSINOPHIL # BLD AUTO: 0.1 K/UL (ref 0–0.5)
EOSINOPHIL NFR BLD: 0.9 % (ref 0–8)
ERYTHROCYTE [DISTWIDTH] IN BLOOD BY AUTOMATED COUNT: 15.4 % (ref 11.5–14.5)
EST. GFR  (AFRICAN AMERICAN): >60 ML/MIN/1.73 M^2
EST. GFR  (NON AFRICAN AMERICAN): >60 ML/MIN/1.73 M^2
GLUCOSE SERPL-MCNC: 102 MG/DL (ref 70–110)
HCT VFR BLD AUTO: 32.2 % (ref 40–54)
HGB BLD-MCNC: 10.7 G/DL (ref 14–18)
IMM GRANULOCYTES # BLD AUTO: 0.03 K/UL (ref 0–0.04)
IMM GRANULOCYTES NFR BLD AUTO: 0.3 % (ref 0–0.5)
INR PPP: 1.4
LYMPHOCYTES # BLD AUTO: 2.4 K/UL (ref 1–4.8)
LYMPHOCYTES NFR BLD: 27.8 % (ref 18–48)
MAGNESIUM SERPL-MCNC: 2 MG/DL (ref 1.6–2.6)
MCH RBC QN AUTO: 35.3 PG (ref 27–31)
MCHC RBC AUTO-ENTMCNC: 33.2 G/DL (ref 32–36)
MCV RBC AUTO: 106 FL (ref 82–98)
MONOCYTES # BLD AUTO: 0.6 K/UL (ref 0.3–1)
MONOCYTES NFR BLD: 7.1 % (ref 4–15)
NEUTROPHILS # BLD AUTO: 5.5 K/UL (ref 1.8–7.7)
NEUTROPHILS NFR BLD: 63.2 % (ref 38–73)
NRBC BLD-RTO: 0 /100 WBC
PLATELET # BLD AUTO: 76 K/UL (ref 150–350)
PMV BLD AUTO: 10.9 FL (ref 9.2–12.9)
POTASSIUM SERPL-SCNC: 3.6 MMOL/L (ref 3.5–5.1)
PROT SERPL-MCNC: 7.8 G/DL (ref 6–8.4)
PROTHROMBIN TIME: 16.4 SEC (ref 10.6–14.8)
RBC # BLD AUTO: 3.03 M/UL (ref 4.6–6.2)
SARS-COV-2 RDRP RESP QL NAA+PROBE: NEGATIVE
SODIUM SERPL-SCNC: 141 MMOL/L (ref 136–145)
TROPONIN I SERPL DL<=0.01 NG/ML-MCNC: <0.03 NG/ML
TROPONIN I SERPL DL<=0.01 NG/ML-MCNC: <0.03 NG/ML
WBC # BLD AUTO: 8.72 K/UL (ref 3.9–12.7)

## 2020-11-07 PROCEDURE — 85025 COMPLETE CBC W/AUTO DIFF WBC: CPT

## 2020-11-07 PROCEDURE — G0378 HOSPITAL OBSERVATION PER HR: HCPCS

## 2020-11-07 PROCEDURE — 93010 ELECTROCARDIOGRAM REPORT: CPT | Mod: ,,, | Performed by: INTERNAL MEDICINE

## 2020-11-07 PROCEDURE — 93005 ELECTROCARDIOGRAM TRACING: CPT | Performed by: INTERNAL MEDICINE

## 2020-11-07 PROCEDURE — 99285 EMERGENCY DEPT VISIT HI MDM: CPT | Mod: 25

## 2020-11-07 PROCEDURE — 93010 EKG 12-LEAD: ICD-10-PCS | Mod: ,,, | Performed by: INTERNAL MEDICINE

## 2020-11-07 PROCEDURE — 96374 THER/PROPH/DIAG INJ IV PUSH: CPT

## 2020-11-07 PROCEDURE — 85610 PROTHROMBIN TIME: CPT

## 2020-11-07 PROCEDURE — 83735 ASSAY OF MAGNESIUM: CPT

## 2020-11-07 PROCEDURE — 63600175 PHARM REV CODE 636 W HCPCS: Performed by: EMERGENCY MEDICINE

## 2020-11-07 PROCEDURE — 84484 ASSAY OF TROPONIN QUANT: CPT

## 2020-11-07 PROCEDURE — U0002 COVID-19 LAB TEST NON-CDC: HCPCS

## 2020-11-07 PROCEDURE — 83880 ASSAY OF NATRIURETIC PEPTIDE: CPT

## 2020-11-07 PROCEDURE — 25000003 PHARM REV CODE 250: Performed by: EMERGENCY MEDICINE

## 2020-11-07 PROCEDURE — 80053 COMPREHEN METABOLIC PANEL: CPT

## 2020-11-07 RX ORDER — ACETAMINOPHEN 325 MG/1
650 TABLET ORAL EVERY 4 HOURS PRN
Status: DISCONTINUED | OUTPATIENT
Start: 2020-11-08 | End: 2020-11-08 | Stop reason: HOSPADM

## 2020-11-07 RX ORDER — NITROGLYCERIN 0.4 MG/1
0.4 TABLET SUBLINGUAL EVERY 5 MIN PRN
Status: DISCONTINUED | OUTPATIENT
Start: 2020-11-08 | End: 2020-11-08 | Stop reason: HOSPADM

## 2020-11-07 RX ORDER — MAGNESIUM SULFATE HEPTAHYDRATE 40 MG/ML
4 INJECTION, SOLUTION INTRAVENOUS
Status: DISCONTINUED | OUTPATIENT
Start: 2020-11-08 | End: 2020-11-08 | Stop reason: HOSPADM

## 2020-11-07 RX ORDER — POTASSIUM CHLORIDE 7.45 MG/ML
40 INJECTION INTRAVENOUS
Status: DISCONTINUED | OUTPATIENT
Start: 2020-11-08 | End: 2020-11-08 | Stop reason: HOSPADM

## 2020-11-07 RX ORDER — POTASSIUM CHLORIDE 20 MEQ/1
40 TABLET, EXTENDED RELEASE ORAL
Status: DISCONTINUED | OUTPATIENT
Start: 2020-11-08 | End: 2020-11-08 | Stop reason: HOSPADM

## 2020-11-07 RX ORDER — POTASSIUM CHLORIDE 7.45 MG/ML
20 INJECTION INTRAVENOUS
Status: DISCONTINUED | OUTPATIENT
Start: 2020-11-08 | End: 2020-11-08 | Stop reason: HOSPADM

## 2020-11-07 RX ORDER — POTASSIUM CHLORIDE 20 MEQ/1
20 TABLET, EXTENDED RELEASE ORAL
Status: DISCONTINUED | OUTPATIENT
Start: 2020-11-08 | End: 2020-11-08 | Stop reason: HOSPADM

## 2020-11-07 RX ORDER — MAGNESIUM SULFATE HEPTAHYDRATE 40 MG/ML
2 INJECTION, SOLUTION INTRAVENOUS
Status: DISCONTINUED | OUTPATIENT
Start: 2020-11-08 | End: 2020-11-08 | Stop reason: HOSPADM

## 2020-11-07 RX ORDER — ASPIRIN 81 MG/1
81 TABLET ORAL DAILY
Status: DISCONTINUED | OUTPATIENT
Start: 2020-11-08 | End: 2020-11-08 | Stop reason: HOSPADM

## 2020-11-07 RX ORDER — ASPIRIN 325 MG
325 TABLET ORAL
Status: COMPLETED | OUTPATIENT
Start: 2020-11-07 | End: 2020-11-07

## 2020-11-07 RX ORDER — BISACODYL 10 MG
10 SUPPOSITORY, RECTAL RECTAL DAILY PRN
Status: DISCONTINUED | OUTPATIENT
Start: 2020-11-08 | End: 2020-11-08 | Stop reason: HOSPADM

## 2020-11-07 RX ORDER — ONDANSETRON 2 MG/ML
4 INJECTION INTRAMUSCULAR; INTRAVENOUS EVERY 8 HOURS PRN
Status: DISCONTINUED | OUTPATIENT
Start: 2020-11-08 | End: 2020-11-08 | Stop reason: HOSPADM

## 2020-11-07 RX ORDER — NAPROXEN SODIUM 220 MG/1
81 TABLET, FILM COATED ORAL DAILY
Status: DISCONTINUED | OUTPATIENT
Start: 2020-11-08 | End: 2020-11-07

## 2020-11-07 RX ORDER — TALC
6 POWDER (GRAM) TOPICAL NIGHTLY PRN
Status: DISCONTINUED | OUTPATIENT
Start: 2020-11-08 | End: 2020-11-08 | Stop reason: HOSPADM

## 2020-11-07 RX ORDER — FUROSEMIDE 10 MG/ML
20 INJECTION INTRAMUSCULAR; INTRAVENOUS
Status: COMPLETED | OUTPATIENT
Start: 2020-11-07 | End: 2020-11-07

## 2020-11-07 RX ORDER — SODIUM CHLORIDE 0.9 % (FLUSH) 0.9 %
10 SYRINGE (ML) INJECTION
Status: DISCONTINUED | OUTPATIENT
Start: 2020-11-08 | End: 2020-11-08 | Stop reason: HOSPADM

## 2020-11-07 RX ORDER — LANOLIN ALCOHOL/MO/W.PET/CERES
800 CREAM (GRAM) TOPICAL
Status: DISCONTINUED | OUTPATIENT
Start: 2020-11-08 | End: 2020-11-08 | Stop reason: HOSPADM

## 2020-11-07 RX ORDER — MAGNESIUM SULFATE 1 G/100ML
1 INJECTION INTRAVENOUS
Status: DISCONTINUED | OUTPATIENT
Start: 2020-11-08 | End: 2020-11-08 | Stop reason: HOSPADM

## 2020-11-07 RX ADMIN — FUROSEMIDE 20 MG: 20 INJECTION, SOLUTION INTRAMUSCULAR; INTRAVENOUS at 08:11

## 2020-11-07 RX ADMIN — NITROGLYCERIN 0.5 INCH: 20 OINTMENT TOPICAL at 08:11

## 2020-11-07 RX ADMIN — ASPIRIN 325 MG ORAL TABLET 325 MG: 325 PILL ORAL at 08:11

## 2020-11-08 ENCOUNTER — HOSPITAL ENCOUNTER (OUTPATIENT)
Dept: RADIOLOGY | Facility: HOSPITAL | Age: 45
Discharge: HOME | End: 2020-11-08
Attending: EMERGENCY MEDICINE
Payer: MEDICAID

## 2020-11-08 ENCOUNTER — CLINICAL SUPPORT (OUTPATIENT)
Dept: CARDIOLOGY | Facility: HOSPITAL | Age: 45
End: 2020-11-08
Attending: EMERGENCY MEDICINE
Payer: MEDICAID

## 2020-11-08 VITALS
SYSTOLIC BLOOD PRESSURE: 132 MMHG | BODY MASS INDEX: 24.67 KG/M2 | DIASTOLIC BLOOD PRESSURE: 80 MMHG | HEART RATE: 73 BPM | RESPIRATION RATE: 16 BRPM | WEIGHT: 182.13 LBS | TEMPERATURE: 97 F | HEIGHT: 72 IN | OXYGEN SATURATION: 95 %

## 2020-11-08 PROBLEM — R07.9 CHEST PAIN: Status: RESOLVED | Noted: 2020-10-27 | Resolved: 2020-11-08

## 2020-11-08 PROBLEM — I50.33 ACUTE ON CHRONIC DIASTOLIC HEART FAILURE: Chronic | Status: RESOLVED | Noted: 2020-11-08 | Resolved: 2020-11-08

## 2020-11-08 PROBLEM — I50.33 ACUTE ON CHRONIC DIASTOLIC HEART FAILURE: Chronic | Status: ACTIVE | Noted: 2020-11-08

## 2020-11-08 LAB
ANION GAP SERPL CALC-SCNC: 15 MMOL/L (ref 8–16)
BASOPHILS # BLD AUTO: 0.05 K/UL (ref 0–0.2)
BASOPHILS NFR BLD: 0.6 % (ref 0–1.9)
BUN SERPL-MCNC: 13 MG/DL (ref 6–20)
CALCIUM SERPL-MCNC: 8.5 MG/DL (ref 8.7–10.5)
CHLORIDE SERPL-SCNC: 99 MMOL/L (ref 95–110)
CO2 SERPL-SCNC: 24 MMOL/L (ref 23–29)
CREAT SERPL-MCNC: 1.1 MG/DL (ref 0.5–1.4)
DIFFERENTIAL METHOD: ABNORMAL
EOSINOPHIL # BLD AUTO: 0.1 K/UL (ref 0–0.5)
EOSINOPHIL NFR BLD: 1.1 % (ref 0–8)
ERYTHROCYTE [DISTWIDTH] IN BLOOD BY AUTOMATED COUNT: 15.2 % (ref 11.5–14.5)
EST. GFR  (AFRICAN AMERICAN): >60 ML/MIN/1.73 M^2
EST. GFR  (NON AFRICAN AMERICAN): >60 ML/MIN/1.73 M^2
GLUCOSE SERPL-MCNC: 97 MG/DL (ref 70–110)
HCT VFR BLD AUTO: 28.4 % (ref 40–54)
HGB BLD-MCNC: 9.5 G/DL (ref 14–18)
IMM GRANULOCYTES # BLD AUTO: 0.02 K/UL (ref 0–0.04)
IMM GRANULOCYTES NFR BLD AUTO: 0.3 % (ref 0–0.5)
LYMPHOCYTES # BLD AUTO: 2.3 K/UL (ref 1–4.8)
LYMPHOCYTES NFR BLD: 28.6 % (ref 18–48)
MCH RBC QN AUTO: 34.7 PG (ref 27–31)
MCHC RBC AUTO-ENTMCNC: 33.5 G/DL (ref 32–36)
MCV RBC AUTO: 104 FL (ref 82–98)
MONOCYTES # BLD AUTO: 0.7 K/UL (ref 0.3–1)
MONOCYTES NFR BLD: 8.5 % (ref 4–15)
NEUTROPHILS # BLD AUTO: 4.9 K/UL (ref 1.8–7.7)
NEUTROPHILS NFR BLD: 60.9 % (ref 38–73)
NRBC BLD-RTO: 0 /100 WBC
PLATELET # BLD AUTO: 64 K/UL (ref 150–350)
PMV BLD AUTO: 11.2 FL (ref 9.2–12.9)
POTASSIUM SERPL-SCNC: 3.5 MMOL/L (ref 3.5–5.1)
RBC # BLD AUTO: 2.74 M/UL (ref 4.6–6.2)
SODIUM SERPL-SCNC: 138 MMOL/L (ref 136–145)
TROPONIN I SERPL DL<=0.01 NG/ML-MCNC: <0.03 NG/ML
TROPONIN I SERPL DL<=0.01 NG/ML-MCNC: <0.03 NG/ML
WBC # BLD AUTO: 7.97 K/UL (ref 3.9–12.7)

## 2020-11-08 PROCEDURE — 93016 CV STRESS TEST SUPVJ ONLY: CPT | Mod: ,,, | Performed by: INTERNAL MEDICINE

## 2020-11-08 PROCEDURE — 96375 TX/PRO/DX INJ NEW DRUG ADDON: CPT

## 2020-11-08 PROCEDURE — 96376 TX/PRO/DX INJ SAME DRUG ADON: CPT

## 2020-11-08 PROCEDURE — 93017 CV STRESS TEST TRACING ONLY: CPT

## 2020-11-08 PROCEDURE — G0378 HOSPITAL OBSERVATION PER HR: HCPCS

## 2020-11-08 PROCEDURE — A9502 TC99M TETROFOSMIN: HCPCS

## 2020-11-08 PROCEDURE — 80048 BASIC METABOLIC PNL TOTAL CA: CPT

## 2020-11-08 PROCEDURE — 85025 COMPLETE CBC W/AUTO DIFF WBC: CPT

## 2020-11-08 PROCEDURE — 93016 NUCLEAR STRESS TEST (CUPID ONLY): ICD-10-PCS | Mod: ,,, | Performed by: INTERNAL MEDICINE

## 2020-11-08 PROCEDURE — 63600175 PHARM REV CODE 636 W HCPCS: Performed by: INTERNAL MEDICINE

## 2020-11-08 PROCEDURE — 25000003 PHARM REV CODE 250: Performed by: NURSE PRACTITIONER

## 2020-11-08 PROCEDURE — 84484 ASSAY OF TROPONIN QUANT: CPT | Mod: 91

## 2020-11-08 PROCEDURE — 63600175 PHARM REV CODE 636 W HCPCS: Performed by: NURSE PRACTITIONER

## 2020-11-08 PROCEDURE — 36415 COLL VENOUS BLD VENIPUNCTURE: CPT

## 2020-11-08 PROCEDURE — 93018 CV STRESS TEST I&R ONLY: CPT | Mod: ,,, | Performed by: INTERNAL MEDICINE

## 2020-11-08 PROCEDURE — 63600175 PHARM REV CODE 636 W HCPCS: Performed by: EMERGENCY MEDICINE

## 2020-11-08 PROCEDURE — 93018 NUCLEAR STRESS TEST (CUPID ONLY): ICD-10-PCS | Mod: ,,, | Performed by: INTERNAL MEDICINE

## 2020-11-08 RX ORDER — GABAPENTIN 300 MG/1
300 CAPSULE ORAL 3 TIMES DAILY
Status: DISCONTINUED | OUTPATIENT
Start: 2020-11-08 | End: 2020-11-08

## 2020-11-08 RX ORDER — CARVEDILOL 12.5 MG/1
12.5 TABLET ORAL 2 TIMES DAILY WITH MEALS
Status: DISCONTINUED | OUTPATIENT
Start: 2020-11-08 | End: 2020-11-08

## 2020-11-08 RX ORDER — PANTOPRAZOLE SODIUM 20 MG/1
40 TABLET, DELAYED RELEASE ORAL DAILY
Status: ON HOLD
Start: 2020-11-08 | End: 2021-02-17 | Stop reason: HOSPADM

## 2020-11-08 RX ORDER — PROPRANOLOL HYDROCHLORIDE 10 MG/1
10 TABLET ORAL 2 TIMES DAILY
Status: DISCONTINUED | OUTPATIENT
Start: 2020-11-08 | End: 2020-11-08

## 2020-11-08 RX ORDER — PANTOPRAZOLE SODIUM 20 MG/1
20 TABLET, DELAYED RELEASE ORAL DAILY
Status: DISCONTINUED | OUTPATIENT
Start: 2020-11-08 | End: 2020-11-08

## 2020-11-08 RX ORDER — LISINOPRIL 5 MG/1
5 TABLET ORAL DAILY
Status: DISCONTINUED | OUTPATIENT
Start: 2020-11-08 | End: 2020-11-08 | Stop reason: HOSPADM

## 2020-11-08 RX ORDER — AMLODIPINE BESYLATE 5 MG/1
10 TABLET ORAL DAILY
Status: DISCONTINUED | OUTPATIENT
Start: 2020-11-08 | End: 2020-11-08 | Stop reason: HOSPADM

## 2020-11-08 RX ORDER — REGADENOSON 0.08 MG/ML
0.4 INJECTION, SOLUTION INTRAVENOUS ONCE
Status: COMPLETED | OUTPATIENT
Start: 2020-11-08 | End: 2020-11-08

## 2020-11-08 RX ORDER — CARVEDILOL 12.5 MG/1
12.5 TABLET ORAL 2 TIMES DAILY WITH MEALS
Status: DISCONTINUED | OUTPATIENT
Start: 2020-11-08 | End: 2020-11-08 | Stop reason: HOSPADM

## 2020-11-08 RX ORDER — POTASSIUM CHLORIDE 20 MEQ/1
20 TABLET, EXTENDED RELEASE ORAL 2 TIMES DAILY
Status: DISCONTINUED | OUTPATIENT
Start: 2020-11-08 | End: 2020-11-08 | Stop reason: HOSPADM

## 2020-11-08 RX ORDER — FUROSEMIDE 20 MG/1
20 TABLET ORAL DAILY
Status: DISCONTINUED | OUTPATIENT
Start: 2020-11-08 | End: 2020-11-08 | Stop reason: HOSPADM

## 2020-11-08 RX ORDER — GABAPENTIN 300 MG/1
300 CAPSULE ORAL 3 TIMES DAILY
Status: DISCONTINUED | OUTPATIENT
Start: 2020-11-08 | End: 2020-11-08 | Stop reason: HOSPADM

## 2020-11-08 RX ORDER — PANTOPRAZOLE SODIUM 20 MG/1
20 TABLET, DELAYED RELEASE ORAL DAILY
Status: DISCONTINUED | OUTPATIENT
Start: 2020-11-08 | End: 2020-11-08 | Stop reason: HOSPADM

## 2020-11-08 RX ORDER — PROPRANOLOL HYDROCHLORIDE 10 MG/1
10 TABLET ORAL 2 TIMES DAILY
Status: DISCONTINUED | OUTPATIENT
Start: 2020-11-08 | End: 2020-11-08 | Stop reason: HOSPADM

## 2020-11-08 RX ORDER — TRAMADOL HYDROCHLORIDE 50 MG/1
50 TABLET ORAL EVERY 8 HOURS PRN
Status: DISCONTINUED | OUTPATIENT
Start: 2020-11-08 | End: 2020-11-08 | Stop reason: HOSPADM

## 2020-11-08 RX ORDER — FUROSEMIDE 10 MG/ML
20 INJECTION INTRAMUSCULAR; INTRAVENOUS ONCE
Status: COMPLETED | OUTPATIENT
Start: 2020-11-08 | End: 2020-11-08

## 2020-11-08 RX ORDER — FUROSEMIDE 20 MG/1
20 TABLET ORAL DAILY
Qty: 30 TABLET | Refills: 11 | Status: ON HOLD | OUTPATIENT
Start: 2020-11-08 | End: 2020-11-12 | Stop reason: SDUPTHER

## 2020-11-08 RX ADMIN — FUROSEMIDE 20 MG: 20 INJECTION, SOLUTION INTRAMUSCULAR; INTRAVENOUS at 01:11

## 2020-11-08 RX ADMIN — REGADENOSON 0.4 MG: 0.08 INJECTION, SOLUTION INTRAVENOUS at 09:11

## 2020-11-08 RX ADMIN — AMLODIPINE BESYLATE 10 MG: 5 TABLET ORAL at 10:11

## 2020-11-08 RX ADMIN — PROPRANOLOL HYDROCHLORIDE 10 MG: 10 TABLET ORAL at 10:11

## 2020-11-08 RX ADMIN — CARVEDILOL 12.5 MG: 12.5 TABLET, FILM COATED ORAL at 10:11

## 2020-11-08 RX ADMIN — ASPIRIN 81 MG: 81 TABLET, DELAYED RELEASE ORAL at 10:11

## 2020-11-08 RX ADMIN — TRAMADOL HYDROCHLORIDE 50 MG: 50 TABLET, FILM COATED ORAL at 10:11

## 2020-11-08 RX ADMIN — CARVEDILOL 12.5 MG: 12.5 TABLET, FILM COATED ORAL at 12:11

## 2020-11-08 RX ADMIN — ONDANSETRON 4 MG: 2 INJECTION INTRAMUSCULAR; INTRAVENOUS at 05:11

## 2020-11-08 RX ADMIN — PANTOPRAZOLE SODIUM 20 MG: 20 TABLET, DELAYED RELEASE ORAL at 12:11

## 2020-11-08 RX ADMIN — POTASSIUM CHLORIDE 20 MEQ: 20 TABLET, EXTENDED RELEASE ORAL at 12:11

## 2020-11-08 RX ADMIN — GABAPENTIN 300 MG: 300 CAPSULE ORAL at 10:11

## 2020-11-08 RX ADMIN — POTASSIUM CHLORIDE 20 MEQ: 20 TABLET, EXTENDED RELEASE ORAL at 10:11

## 2020-11-08 RX ADMIN — TRAMADOL HYDROCHLORIDE 50 MG: 50 TABLET, FILM COATED ORAL at 12:11

## 2020-11-08 RX ADMIN — POTASSIUM CHLORIDE 40 MEQ: 20 TABLET, EXTENDED RELEASE ORAL at 05:11

## 2020-11-08 RX ADMIN — FUROSEMIDE 20 MG: 20 TABLET ORAL at 10:11

## 2020-11-08 RX ADMIN — PROPRANOLOL HYDROCHLORIDE 10 MG: 10 TABLET ORAL at 12:11

## 2020-11-08 RX ADMIN — GABAPENTIN 300 MG: 300 CAPSULE ORAL at 12:11

## 2020-11-08 RX ADMIN — LISINOPRIL 5 MG: 5 TABLET ORAL at 10:11

## 2020-11-08 NOTE — SUBJECTIVE & OBJECTIVE
Past Medical History:   Diagnosis Date    Alcoholic cirrhosis of liver     Arthritis     ATN (acute tubular necrosis)     Diverticulitis 01/2020    GI bleed     Hip arthritis     Left    Hypertension     Macrocytic anemia     Pulmonary embolism 08/2018    Unprovoked DVT.  Stop Coumadin due to GIB    Thrombocytopenia        Past Surgical History:   Procedure Laterality Date    ANKLE SURGERY      COLON SURGERY  2007    [facility]    ESOPHAGOGASTRODUODENOSCOPY N/A 7/26/2019    Procedure: EGD (ESOPHAGOGASTRODUODENOSCOPY);  Surgeon: Brian Trivedi MD;  Location: [facility];  Service: Endoscopy;  Laterality: N/A;    ESOPHAGOGASTRODUODENOSCOPY N/A 4/8/2020    Procedure: EGD (ESOPHAGOGASTRODUODENOSCOPY);  Surgeon: Donn Acuna MD;  Location: [facility];  Service: Endoscopy;  Laterality: N/A;    HERNIA REPAIR Left     Inguinal       Review of patient's allergies indicates:   Allergen Reactions    Ciprofloxacin hcl Hallucinations    Morphine Itching       No current facility-administered medications on file prior to encounter.      Current Outpatient Medications on File Prior to Encounter   Medication Sig    carvediloL (COREG) 6.25 MG tablet Take 12.5 mg by mouth 2 (two) times daily with meals.     furosemide (LASIX) 20 MG tablet Take 1 tablet (20 mg total) by mouth once daily.    gabapentin (NEURONTIN) 300 MG capsule Take 1 capsule (300 mg total) by mouth 3 (three) times daily.    lisinopriL (PRINIVIL,ZESTRIL) 5 MG tablet Take 1 tablet (5 mg total) by mouth once daily.    pantoprazole (PROTONIX) 20 MG tablet Take 20 mg by mouth once daily.    potassium chloride SA (K-DUR,KLOR-CON) 20 MEQ tablet Take 1 tablet (20 mEq total) by mouth 2 (two) times daily.    propranoloL (INDERAL) 10 MG tablet Take 10 mg by mouth 2 (two) times daily.    amLODIPine (NORVASC) 5 MG tablet Take 2 tablets (10 mg total) by mouth once daily.    ondansetron (ZOFRAN ODT) 4 MG TbDL Take 1 tablet (4 mg  total) by mouth every 8 (eight) hours as needed (Nausea and Vomiting).    polyethylene glycol (GLYCOLAX) 17 gram PwPk Take 17 g by mouth 2 (two) times daily.    sucralfate (CARAFATE) 1 gram tablet Take 1 g by mouth 4 (four) times daily.    traMADoL (ULTRAM) 50 mg tablet Take 50 mg by mouth every 8 (eight) hours as needed for Pain.     Family History     Problem Relation (Age of Onset)    Bladder Cancer Father    Breast cancer Mother    Hypertension Mother, Father    Prostate cancer Father        Tobacco Use    Smoking status: Former Smoker     Quit date:      Years since quittin.    Smokeless tobacco: Never Used    Tobacco comment: quit 20 years ago   Substance and Sexual Activity    Alcohol use: Not Currently     Comment: freq    Drug use: Not Currently     Types: Marijuana    Sexual activity: Yes     Comment: occ     Review of Systems   All other systems reviewed and are negative.    Objective:     Vital Signs (Most Recent):  Temp: 98.4 °F (36.9 °C) (20)  Pulse: 80 (20)  Resp: 17 (20)  BP: (!) 141/80 (20)  SpO2: 96 % (20) Vital Signs (24h Range):  Temp:  [98.4 °F (36.9 °C)] 98.4 °F (36.9 °C)  Pulse:  [75-82] 80  Resp:  [17-20] 17  SpO2:  [96 %-100 %] 96 %  BP: (111-141)/(56-80) 141/80     Weight: 86.2 kg (190 lb)  Body mass index is 25.77 kg/m².    Physical Exam  Vitals signs reviewed.   Constitutional:       Appearance: Normal appearance.   HENT:      Head: Normocephalic and atraumatic.      Right Ear: External ear normal.      Left Ear: External ear normal.      Mouth/Throat:      Mouth: Mucous membranes are moist.      Pharynx: No oropharyngeal exudate or posterior oropharyngeal erythema.   Eyes:      General: No scleral icterus.        Right eye: No discharge.         Left eye: No discharge.   Neck:      Musculoskeletal: Normal range of motion and neck supple.   Cardiovascular:      Rate and Rhythm: Normal rate.      Heart sounds: No  murmur.   Pulmonary:      Effort: Pulmonary effort is normal.      Breath sounds: Normal breath sounds. No wheezing or rales.   Abdominal:      Palpations: Abdomen is soft.   Genitourinary:     Comments: No Saravia  Musculoskeletal: Normal range of motion.      Right lower leg: No edema.      Left lower leg: No edema.   Skin:     General: Skin is warm and dry.      Capillary Refill: Capillary refill takes less than 2 seconds.   Neurological:      General: No focal deficit present.      Mental Status: He is alert.   Psychiatric:         Mood and Affect: Mood normal.         Behavior: Behavior normal.             Significant Labs:   CBC:   Recent Labs   Lab 11/07/20 2020   WBC 8.72   HGB 10.7*   HCT 32.2*   PLT 76*     CMP:   Recent Labs   Lab 11/07/20 2020      K 3.6      CO2 26      BUN 13   CREATININE 1.2   CALCIUM 8.6*   PROT 7.8   ALBUMIN 3.4*   BILITOT 3.9*   ALKPHOS 100   AST 83*   ALT 43   ANIONGAP 12   EGFRNONAA >60.0     Cardiac Markers:   Recent Labs   Lab 11/07/20 2020   BNP 54  54     Coagulation:   Recent Labs   Lab 11/07/20 2020   INR 1.4     Lactic Acid: No results for input(s): LACTATE in the last 48 hours.  Lipase: No results for input(s): LIPASE in the last 48 hours.  Magnesium:   Recent Labs   Lab 11/07/20 2020   MG 2.0     Troponin:   Recent Labs   Lab 11/07/20 2020 11/07/20  2231   TROPONINI <0.030 <0.030       Significant Imaging: I have reviewed all pertinent imaging results/findings within the past 24 hours.         X-ray Chest Ap Portable    Result Date: 11/7/2020  EXAMINATION: XR CHEST AP PORTABLE CLINICAL HISTORY: CHF; COMPARISON: 10/27/2020 FINDINGS: Cardiomediastinal silhouette is upper limit of normal, and stable compared to prior.  Slight prominence of central pulmonary vascularity without kenia pulmonary edema.  No airspace consolidation.  No pleural effusion or pneumothorax.  No acute osseous abnormality.     Borderline enlarged heart size with findings of mild  volume overload/vascular congestion, however no kenia pulmonary edema. Electronically signed by: Shamar Mercado MD Date:    11/07/2020 Time:    20:12

## 2020-11-08 NOTE — ED PROVIDER NOTES
"Encounter Date: 11/7/2020       History     Chief Complaint   Patient presents with    Chest Pain     'Feels like an elephant is sitting on my chest." 9/10 pain at this time. Hx of cirrhosis/dvt.    Shortness of Breath     SOB w/ excertion/activity. Recently diagnosed with CHF two weeks ago. Compliant with BP medication.     45-year-old male presented emergency department with 2 weeks of gradually worsening substernal chest tightness which never got better since his previous hospital admission.  Patient was admitted for treatment of congestive heart failure about 2 weeks ago.  Patient still having shortness of breath and patient has chest tightness.  Patient said minimal exertion is making him become very symptomatic and make him short of breath and chest tightness is getting worse.  Patient states if he gets up and starts walking he is getting very short of breath and chest feels tight and he rates the chest tightness 8/10 and it is substernal in nature.  Patient states the pain does not radiate anywhere.  Patient has chronic hip problems in the left hip.  Patient has previous history of varices and cirrhosis of the liver and used to be colic however does not drink anymore.  Denies fever chills or nausea vomiting or abdominal pain or weakness numbness.  Denies any leg pain or swelling or edema.  Denies fever or chills        Review of patient's allergies indicates:   Allergen Reactions    Ciprofloxacin hcl Hallucinations    Morphine Itching     Past Medical History:   Diagnosis Date    Alcoholic cirrhosis of liver     Arthritis     ATN (acute tubular necrosis)     Diverticulitis 01/2020    GI bleed     Hip arthritis     Left    Hypertension     Macrocytic anemia     Pulmonary embolism 08/2018    Unprovoked DVT.  Stop Coumadin due to GIB    Thrombocytopenia      Past Surgical History:   Procedure Laterality Date    ANKLE SURGERY      COLON SURGERY  2007    [facility]    " ESOPHAGOGASTRODUODENOSCOPY N/A 2019    Procedure: EGD (ESOPHAGOGASTRODUODENOSCOPY);  Surgeon: Brian Trivedi MD;  [facility];  Service: Endoscopy;  Laterality: N/A;    ESOPHAGOGASTRODUODENOSCOPY N/A 2020    Procedure: EGD (ESOPHAGOGASTRODUODENOSCOPY);  Surgeon: Donn Acuna MD;  Location: [facility];  Service: Endoscopy;  Laterality: N/A;    HERNIA REPAIR Left     Inguinal     Family History   Problem Relation Age of Onset    Hypertension Mother     Breast cancer Mother     Hypertension Father     Prostate cancer Father     Bladder Cancer Father      Social History     Tobacco Use    Smoking status: Former Smoker     Quit date: 2000     Years since quittin.8    Smokeless tobacco: Never Used    Tobacco comment: quit 20 years ago   Substance Use Topics    Alcohol use: Not Currently     Comment: freq    Drug use: Not Currently     Types: Marijuana     Review of Systems   Constitutional: Negative.    HENT: Negative.    Eyes: Negative.    Respiratory: Positive for chest tightness and shortness of breath.    Cardiovascular: Positive for chest pain. Negative for palpitations.   Gastrointestinal: Negative.    Endocrine: Negative.    Genitourinary: Negative.    Musculoskeletal: Negative.    Skin: Negative.    Allergic/Immunologic: Negative.    Neurological: Negative.    Hematological: Negative.    Psychiatric/Behavioral: Negative.    All other systems reviewed and are negative.      Physical Exam     Initial Vitals [20 1910]   BP Pulse Resp Temp SpO2   127/69 82 20 98.4 °F (36.9 °C) 98 %      MAP       --         Physical Exam    Nursing note and vitals reviewed.  Constitutional: He appears well-developed and well-nourished.   HENT:   Head: Normocephalic and atraumatic.   Nose: Nose normal.   Eyes: Conjunctivae and EOM are normal.   Neck: Normal range of motion. Neck supple. No tracheal deviation present.   Cardiovascular: Normal rate, regular rhythm, normal heart sounds and  intact distal pulses. Exam reveals no friction rub.    No murmur heard.  Pulmonary/Chest: Breath sounds normal. No respiratory distress. He has no wheezes. He has no rales.   Abdominal: Soft. He exhibits no distension. There is no abdominal tenderness.   Musculoskeletal: Normal range of motion. No tenderness or edema.   Neurological: He is alert and oriented to person, place, and time. He has normal strength. No sensory deficit. GCS score is 15. GCS eye subscore is 4. GCS verbal subscore is 5. GCS motor subscore is 6.   Skin: Skin is warm and dry. Capillary refill takes less than 2 seconds.   Psychiatric: He has a normal mood and affect. Thought content normal.         ED Course   Procedures  Labs Reviewed   PROTIME-INR - Abnormal; Notable for the following components:       Result Value    PT 16.4 (*)     All other components within normal limits   CBC W/ AUTO DIFFERENTIAL - Abnormal; Notable for the following components:    RBC 3.03 (*)     Hemoglobin 10.7 (*)     Hematocrit 32.2 (*)      (*)     MCH 35.3 (*)     RDW 15.4 (*)     Platelets 76 (*)     All other components within normal limits   COMPREHENSIVE METABOLIC PANEL - Abnormal; Notable for the following components:    Calcium 8.6 (*)     Albumin 3.4 (*)     Total Bilirubin 3.9 (*)     AST 83 (*)     All other components within normal limits   TROPONIN I   B-TYPE NATRIURETIC PEPTIDE   B-TYPE NATRIURETIC PEPTIDE   MAGNESIUM   SARS-COV-2 RNA AMPLIFICATION, QUAL   TROPONIN I     EKG Readings: (Independently Interpreted)   Initial Reading: No STEMI. Rhythm: Normal Sinus Rhythm. Ectopy: No Ectopy. Conduction: Normal.       Imaging Results          X-Ray Chest AP Portable (Final result)  Result time 11/07/20 20:12:17    Final result by Shamar Mercado MD (11/07/20 20:12:17)                 Impression:      Borderline enlarged heart size with findings of mild volume overload/vascular congestion, however no kenia pulmonary edema.      Electronically signed  by: Shamar Mercado MD  Date:    11/07/2020  Time:    20:12             Narrative:    EXAMINATION:  XR CHEST AP PORTABLE    CLINICAL HISTORY:  CHF;    COMPARISON:  10/27/2020    FINDINGS:  Cardiomediastinal silhouette is upper limit of normal, and stable compared to prior.  Slight prominence of central pulmonary vascularity without kenia pulmonary edema.  No airspace consolidation.  No pleural effusion or pneumothorax.  No acute osseous abnormality.                                 Medical Decision Making:   Differential Diagnosis:   Patient with exertional chest pain and shortness of breath.  History of congestive heart failure patient said he never had stress test or an angiogram.  Given patient's symptoms screening cardiac workup done.  Patient's chest pain resolved with nitroglycerin.  Lasix given.  As patient feeling better Hospital Medicine consulted for evaluation for further management.  Clinical Tests:   Lab Tests: Reviewed  Radiological Study: Reviewed  Medical Tests: Reviewed                             Clinical Impression:       ICD-10-CM ICD-9-CM   1. Chest pain, unspecified type  R07.9 786.50   2. Chest pain  R07.9 786.50   3. Shortness of breath  R06.02 786.05                          ED Disposition Condition    Admit                             Usman Zazueta MD  11/07/20 2239

## 2020-11-08 NOTE — NURSING
Pt verbalized understanding of discharge education and instructions, made aware of prescription sent to pharmacy. Tele box returned to monitor tech. Pt ambulated off unit.

## 2020-11-08 NOTE — ASSESSMENT & PLAN NOTE
Stable  Results for MARIA ISABEL LUX (MRN [id number]) as of 11/7/2020 23:38   Ref. Range 10/19/2020 01:57 10/21/2020 04:16 10/26/2020 06:24 10/27/2020 05:06 11/7/2020 20:20   Hemoglobin Latest Ref Range: 14.0 - 18.0 g/dL 11.2 (L) 11.6 (L) 9.8 (L) 10.6 (L) 10.7 (L)   Hematocrit Latest Ref Range: 40.0 - 54.0 % 32.9 (L) 33.9 (L) 28.5 (L) 31.0 (L) 32.2 (L)   Monitor

## 2020-11-08 NOTE — HPI
"45 year old male with history of cirrhosis, GI bleed due to esophageal varices, DVT  Presented to the emergency department with shortness of breath and chest pain  States that he was admitted at [facility] about 2 weeks ago with heart failure  States that he has not been right since discharge, feeling weak and shortness of breath all the time, worse with talking or minimal activities such as dressing himself  Denies fever, chills, cough  Denies weight gain, edema  Also reports severe persistent entire chest pain, " like elephant sitting on my chest", with shortness of breath, since last night, no alleviating factors  Also has numbness on left fingers, possibly due to pinched nerve  Denies nausea, vomiting, diaphoresis  Reports compliant with medication  Never had a stress test  Had recent EGD to check on his varices, told that it was OK    Her my chart review, he had echocardiogram on 10/27/2020 which showed ejection fraction 55% and normal LV diastolic function  CTA chest on 10/27/2020:  No PE  Ultrasound lower extremities on 10/27/2020:  No DVT    Workup in the ED was unremarkable.  BNP normal at 54, troponins normal less than 0.030.  EKG, personally reviewed, showed sinus rhythm,  ms, no ischemic changes. CXR showed borderline enlarged heart size with findings of mild volume overload/vascular congestion, however no kenia pulmonary edema.       "

## 2020-11-08 NOTE — PROGRESS NOTES
@  09:46 PATIENT INJECTED WITH    MYOVIEW  RT AC IV FOR STRESS IMAGES.    LEXISCAN STRESS       RELEASE NPO STATUS FROM NUCLEAR MEDICINE.

## 2020-11-08 NOTE — ASSESSMENT & PLAN NOTE
EKG showed no ischemic changes  Troponin negative  Reason CTA chest showed no PE  Cardiac monitor  Trend troponin  Lexiscan stress test in a.m.  Aspirin and nitroglycerin  Continue beta-blocker

## 2020-11-08 NOTE — PLAN OF CARE
11/08/20 1612   Final Note   Assessment Type Final Discharge Note   Anticipated Discharge Disposition Home   Post-Acute Status   Post-Acute Authorization Other   Other Status No Post-Acute Service Needs   Discharge Delays None known at this time

## 2020-11-08 NOTE — ASSESSMENT & PLAN NOTE
Unclear etiology  Echo on 10/27 showed normal ejection fraction and normal left ventricular diastolic function  BNP normal  No weight gain, no swelling  Lungs clear  Recent CTA chest showed no PE  Not tachycardic, SpO2 99% on room air  Quit smoking over 10 years ago, denies secondhand exposure  Reports last use of marijuana about a month ago  Chest radiograph showed mild volume overload/vascular congestion, no kenia pulmonary edema  Lasix 20 mg IV given in the ED  Continue home med Lasix for 20 mg daily

## 2020-11-08 NOTE — H&P
"[facility] Medicine  History & Physical    Patient Name: Irvin Diaz  MRN: [id number]  Admission Date: 11/7/2020  Attending Physician: Leanne Park MD  Primary Care Provider: [facility]         Patient information was obtained from patient, past medical records and ER records.     Subjective:     Principal Problem:Chest pain    Chief Complaint:   Chief Complaint   Patient presents with    Chest Pain     'Feels like an elephant is sitting on my chest." 9/10 pain at this time. Hx of cirrhosis/dvt.    Shortness of Breath     SOB w/ excertion/activity. Recently diagnosed with CHF two weeks ago. Compliant with BP medication.        HPI: 45 year old male with history of cirrhosis, GI bleed due to esophageal varices, DVT  Presented to the emergency department with shortness of breath and chest pain  States that he was admitted at [facility] about 2 weeks ago with heart failure  States that he has not been right since discharge, feeling weak and shortness of breath all the time, worse with talking or minimal activities such as dressing himself  Denies fever, chills, cough  Denies weight gain, edema  Also reports severe persistent entire chest pain, " like elephant sitting on my chest", with shortness of breath, since last night, no alleviating factors  Also has numbness on left finger, possibly due to pinched nerve  Denies nausea, vomiting, diaphoresis  Reports compliant with medication  Never had a stress test  Had recent EGD to check on his varices, told that it was OK    Her my chart review, he had echocardiogram on 10/27/2020 which showed ejection fraction 55% and normal LV diastolic function  CTA chest on 10/27/2020:  No PE  Ultrasound lower extremities on 10/27/2020:  No DVT    Workup in the ED was unremarkable.  BNP normal at 54, troponins normal less than 0.030.  EKG, personally reviewed, showed sinus rhythm,  ms, no ischemic changes. " CXR showed borderline enlarged heart size with findings of mild volume overload/vascular congestion, however no kenia pulmonary edema.         Past Medical History:   Diagnosis Date    Alcoholic cirrhosis of liver     Arthritis     ATN (acute tubular necrosis)     Diverticulitis 01/2020    GI bleed     Hip arthritis     Left    Hypertension     Macrocytic anemia     Pulmonary embolism 08/2018    Unprovoked DVT.  Stop Coumadin due to GIB    Thrombocytopenia        Past Surgical History:   Procedure Laterality Date    ANKLE SURGERY      COLON SURGERY  2007    COLONOSCOPY  09/05/2019    [facility]    ESOPHAGOGASTRODUODENOSCOPY N/A 7/26/2019    Procedure: EGD (ESOPHAGOGASTRODUODENOSCOPY);  Surgeon: Brian Trivedi MD;  Location: [facility];  Service: Endoscopy;  Laterality: N/A;    ESOPHAGOGASTRODUODENOSCOPY N/A 4/8/2020    Procedure: EGD (ESOPHAGOGASTRODUODENOSCOPY);  Surgeon: Donn Acuna MD;  Location: [facility];  Service: Endoscopy;  Laterality: N/A;    HERNIA REPAIR Left     Inguinal       Review of patient's allergies indicates:   Allergen Reactions    Ciprofloxacin hcl Hallucinations    Morphine Itching       No current facility-administered medications on file prior to encounter.      Current Outpatient Medications on File Prior to Encounter   Medication Sig    carvediloL (COREG) 6.25 MG tablet Take 12.5 mg by mouth 2 (two) times daily with meals.     furosemide (LASIX) 20 MG tablet Take 1 tablet (20 mg total) by mouth once daily.    gabapentin (NEURONTIN) 300 MG capsule Take 1 capsule (300 mg total) by mouth 3 (three) times daily.    lisinopriL (PRINIVIL,ZESTRIL) 5 MG tablet Take 1 tablet (5 mg total) by mouth once daily.    pantoprazole (PROTONIX) 20 MG tablet Take 20 mg by mouth once daily.    potassium chloride SA (K-DUR,KLOR-CON) 20 MEQ tablet Take 1 tablet (20 mEq total) by mouth 2 (two) times daily.    propranoloL (INDERAL) 10 MG tablet Take 10 mg by mouth 2 (two) times daily.     amLODIPine (NORVASC) 5 MG tablet Take 2 tablets (10 mg total) by mouth once daily.    ondansetron (ZOFRAN ODT) 4 MG TbDL Take 1 tablet (4 mg total) by mouth every 8 (eight) hours as needed (Nausea and Vomiting).    polyethylene glycol (GLYCOLAX) 17 gram PwPk Take 17 g by mouth 2 (two) times daily.    sucralfate (CARAFATE) 1 gram tablet Take 1 g by mouth 4 (four) times daily.    traMADoL (ULTRAM) 50 mg tablet Take 50 mg by mouth every 8 (eight) hours as needed for Pain.     Family History     Problem Relation (Age of Onset)    Bladder Cancer Father    Breast cancer Mother    Hypertension Mother, Father    Prostate cancer Father        Tobacco Use    Smoking status: Former Smoker     Quit date:      Years since quittin.    Smokeless tobacco: Never Used    Tobacco comment: quit 20 years ago   Substance and Sexual Activity    Alcohol use: Not Currently     Comment: freq    Drug use: Not Currently     Types: Marijuana    Sexual activity: Yes     Comment: occ     Review of Systems   All other systems reviewed and are negative.    Objective:     Vital Signs (Most Recent):  Temp: 98.4 °F (36.9 °C) (20)  Pulse: 80 (20)  Resp: 17 (20)  BP: (!) 141/80 (20)  SpO2: 96 % (20) Vital Signs (24h Range):  Temp:  [98.4 °F (36.9 °C)] 98.4 °F (36.9 °C)  Pulse:  [75-82] 80  Resp:  [17-20] 17  SpO2:  [96 %-100 %] 96 %  BP: (111-141)/(56-80) 141/80     Weight: 86.2 kg (190 lb)  Body mass index is 25.77 kg/m².    Physical Exam  Vitals signs reviewed.   Constitutional:       Appearance: Normal appearance.   HENT:      Head: Normocephalic and atraumatic.      Right Ear: External ear normal.      Left Ear: External ear normal.      Mouth/Throat:      Mouth: Mucous membranes are moist.      Pharynx: No oropharyngeal exudate or posterior oropharyngeal erythema.   Eyes:      General: No scleral icterus.        Right eye: No discharge.         Left eye: No discharge.    Neck:      Musculoskeletal: Normal range of motion and neck supple.   Cardiovascular:      Rate and Rhythm: Normal rate.      Heart sounds: No murmur.   Pulmonary:      Effort: Pulmonary effort is normal.      Breath sounds: Normal breath sounds. No wheezing or rales.   Abdominal:      Palpations: Abdomen is soft.   Genitourinary:     Comments: No Saravia  Musculoskeletal: Normal range of motion.      Right lower leg: No edema.      Left lower leg: No edema.   Skin:     General: Skin is warm and dry.      Capillary Refill: Capillary refill takes less than 2 seconds.   Neurological:      General: No focal deficit present.      Mental Status: He is alert.   Psychiatric:         Mood and Affect: Mood normal.         Behavior: Behavior normal.             Significant Labs:   CBC:   Recent Labs   Lab 11/07/20 2020   WBC 8.72   HGB 10.7*   HCT 32.2*   PLT 76*     CMP:   Recent Labs   Lab 11/07/20 2020      K 3.6      CO2 26      BUN 13   CREATININE 1.2   CALCIUM 8.6*   PROT 7.8   ALBUMIN 3.4*   BILITOT 3.9*   ALKPHOS 100   AST 83*   ALT 43   ANIONGAP 12   EGFRNONAA >60.0     Cardiac Markers:   Recent Labs   Lab 11/07/20 2020   BNP 54  54     Coagulation:   Recent Labs   Lab 11/07/20 2020   INR 1.4     Lactic Acid: No results for input(s): LACTATE in the last 48 hours.  Lipase: No results for input(s): LIPASE in the last 48 hours.  Magnesium:   Recent Labs   Lab 11/07/20 2020   MG 2.0     Troponin:   Recent Labs   Lab 11/07/20 2020 11/07/20  2231   TROPONINI <0.030 <0.030       Significant Imaging: I have reviewed all pertinent imaging results/findings within the past 24 hours.         X-ray Chest Ap Portable    Result Date: 11/7/2020  EXAMINATION: XR CHEST AP PORTABLE CLINICAL HISTORY: CHF; COMPARISON: 10/27/2020 FINDINGS: Cardiomediastinal silhouette is upper limit of normal, and stable compared to prior.  Slight prominence of central pulmonary vascularity without kenia pulmonary edema.  No  airspace consolidation.  No pleural effusion or pneumothorax.  No acute osseous abnormality.     Borderline enlarged heart size with findings of mild volume overload/vascular congestion, however no kenia pulmonary edema. Electronically signed by: Shamar Mercado MD Date:    11/07/2020 Time:    20:12        Assessment/Plan:     * Chest pain  EKG showed no ischemic changes  Troponin negative  Reason CTA chest showed no PE  Cardiac monitor  Trend troponin  Lexiscan stress test in a.m.  Aspirin and nitroglycerin  Continue beta-blocker      Shortness of breath  Unclear etiology  Echo on 10/27 showed normal ejection fraction and normal left ventricular diastolic function  BNP normal  No weight gain, no swelling  Lungs clear  Recent CTA chest showed no PE  Not tachycardic, SpO2 99% on room air  Quit smoking over 10 years ago, denies secondhand exposure  Reports last use of marijuana about a month ago  Chest radiograph showed mild volume overload/vascular congestion, no kenia pulmonary edema  Lasix 20 mg IV given in the ED  Continue home med Lasix for 20 mg daily      Macrocytic anemia  Stable  Results for MARIA ISABEL LUX (MRN [id number]) as of 11/7/2020 23:38   Ref. Range 10/19/2020 01:57 10/21/2020 04:16 10/26/2020 06:24 10/27/2020 05:06 11/7/2020 20:20   Hemoglobin Latest Ref Range: 14.0 - 18.0 g/dL 11.2 (L) 11.6 (L) 9.8 (L) 10.6 (L) 10.7 (L)   Hematocrit Latest Ref Range: 40.0 - 54.0 % 32.9 (L) 33.9 (L) 28.5 (L) 31.0 (L) 32.2 (L)   Monitor      Essential hypertension  Stable  Continue home medication  Monitor      Thrombocytopenia  Chronic medical condition  Monitor      VTE Risk Mitigation (From admission, onward)         Ordered     IP VTE LOW RISK PATIENT  Once      11/07/20 2302     Place sequential compression device  Until discontinued      11/07/20 2302                   FLO Cotter  Department of Hospital Medicine   [facility]

## 2020-11-08 NOTE — HOSPITAL COURSE
Patient presented with shortness of breath and chest discomfort described as elephant sitting on his chest.  Recent admission at outside hospital, echo with EF 55%, CTA no PE medications were adjusted and discharged home.  He has a history of alcoholic cirrhosis with portal hypertension with varices and GI bleed.  States he follows with gastroenterologist in [place], has appointment end of month.  Has intermittent nausea, denies any worsening abdominal distension.  Vitals were stable, chest x-ray was concerning for borderline enlarged heart with mild volume overload/edema, he was given dose of iv Lasix.  Admitted for ACS evaluation.  Serial cardiac biomarkers remained non elevated, EKG no ST elevation, no paroxysmal arrhythmias on telemetry, nuclear stress test no evidence of ischemia or infarct, EF at 67%.  On 11/08 reports symptoms are improved, medically stable for discharge.  Discharge plan including test/diagnosis, medication as well as follow-up were reviewed with patient.  All questions were addressed.  No objection to discharge.    Discharge examination  Lying in bed no apparent distress alert, regular heart rhythm, not on supplemental oxygen with good air entry bilaterally, abdomen soft, no lower extremity edema

## 2020-11-08 NOTE — DISCHARGE SUMMARY
"[facility] Medicine  Discharge Summary      Patient Name: Irvin Diaz  MRN: [id number]  Admission Date: 11/7/2020  Hospital Length of Stay: 0 days  Discharge Date and Time:  11/08/2020 1:19 PM  Attending Physician: Melisa Matute MD   Discharging Provider: Melisa Matute MD  Primary Care Provider: [facility]      HPI:   45 year old male with history of cirrhosis, GI bleed due to esophageal varices, DVT  Presented to the emergency department with shortness of breath and chest pain  States that he was admitted at [facility] about 2 weeks ago with heart failure  States that he has not been right since discharge, feeling weak and shortness of breath all the time, worse with talking or minimal activities such as dressing himself  Denies fever, chills, cough  Denies weight gain, edema  Also reports severe persistent entire chest pain, " like elephant sitting on my chest", with shortness of breath, since last night, no alleviating factors  Also has numbness on left fingers, possibly due to pinched nerve  Denies nausea, vomiting, diaphoresis  Reports compliant with medication  Never had a stress test  Had recent EGD to check on his varices, told that it was OK    Her my chart review, he had echocardiogram on 10/27/2020 which showed ejection fraction 55% and normal LV diastolic function  CTA chest on 10/27/2020:  No PE  Ultrasound lower extremities on 10/27/2020:  No DVT    Workup in the ED was unremarkable.  BNP normal at 54, troponins normal less than 0.030.  EKG, personally reviewed, showed sinus rhythm,  ms, no ischemic changes. CXR showed borderline enlarged heart size with findings of mild volume overload/vascular congestion, however no kenia pulmonary edema.         * No surgery found *      Hospital Course:   Patient presented with shortness of breath and chest discomfort described as elephant sitting on his chest.  Recent " admission at outside hospital, echo with EF 55%, CTA no PE medications were adjusted and discharged home.  He has a history of alcoholic cirrhosis with portal hypertension with varices and GI bleed.  States he follows with gastroenterologist in [place], has appointment end of month.  Has intermittent nausea, denies any worsening abdominal distension.  Vitals were stable, chest x-ray was concerning for borderline enlarged heart with mild volume overload/edema, he was given dose of iv Lasix.  Admitted for ACS evaluation.  Serial cardiac biomarkers remained non elevated, EKG no ST elevation, no paroxysmal arrhythmias on telemetry, nuclear stress test no evidence of ischemia or infarct, EF at 67%.  On 11/08 reports symptoms are improved, medically stable for discharge.  Discharge plan including test/diagnosis, medication as well as follow-up were reviewed with patient.  All questions were addressed.  No objection to discharge.    Discharge examination  Lying in bed no apparent distress alert, regular heart rhythm, not on supplemental oxygen with good air entry bilaterally, abdomen soft, no lower extremity edema     Consults:   Consults (From admission, onward)        Status Ordering Provider     Inpatient consult to Hospitalist  Once     Provider:  FLO Wade UJWAL          No new Assessment & Plan notes have been filed under this hospital service since the last note was generated.  Service: Hospital Medicine    Final Active Diagnoses:    Diagnosis Date Noted POA    Acute on chronic pEF [I50.33] 11/08/2020 Yes     Chronic    Essential hypertension [I10]  Yes     Chronic    Macrocytic anemia [D53.9]  Yes    Alcoholic cirrhosis of liver [K70.30]  Yes     Chronic    Thrombocytopenia [D69.6] 05/11/2019 Yes    Secondary esophageal varices with bleeding [I85.11] 05/11/2019 Yes      Problems Resolved During this Admission:    Diagnosis Date Noted Date Resolved POA    PRINCIPAL  PROBLEM:  Chest pain [R07.9] 10/27/2020 11/08/2020 Yes       Discharged Condition: good    Disposition: Home or Self Care    Follow Up:  Follow-up Information     [facility]. Schedule an appointment as soon as possible for a visit in 1 week.    Specialties: Internal Medicine, Family Medicine  Why: post hospital follow up  Contact information:  [address]  [place] [address]  [phone]                 Patient Instructions:      Diet Cardiac     Notify your health care provider if you experience any of the following:  temperature >100.4     Notify your health care provider if you experience any of the following:  difficulty breathing or increased cough     Notify your health care provider if you experience any of the following:  severe uncontrolled pain     Activity as tolerated       Significant Diagnostic Studies: Labs:   BMP:   Recent Labs   Lab 11/07/20 2020 11/08/20 0335    97    138   K 3.6 3.5    99   CO2 26 24   BUN 13 13   CREATININE 1.2 1.1   CALCIUM 8.6* 8.5*   MG 2.0  --    , CMP   Recent Labs   Lab 11/07/20 2020 11/08/20 0335    138   K 3.6 3.5    99   CO2 26 24    97   BUN 13 13   CREATININE 1.2 1.1   CALCIUM 8.6* 8.5*   PROT 7.8  --    ALBUMIN 3.4*  --    BILITOT 3.9*  --    ALKPHOS 100  --    AST 83*  --    ALT 43  --    ANIONGAP 12 15   ESTGFRAFRICA >60.0 >60.0   EGFRNONAA >60.0 >60.0   , CBC   Recent Labs   Lab 11/07/20 2020 11/08/20 0335   WBC 8.72 7.97   HGB 10.7* 9.5*   HCT 32.2* 28.4*   PLT 76* 64*   , INR   Lab Results   Component Value Date    INR 1.4 11/07/2020    INR 1.3 (H) 10/26/2020    INR 1.3 (H) 04/08/2020   , Lipid Panel   Lab Results   Component Value Date    CHOL 251 (H) 10/27/2020    HDL 62 10/27/2020    LDLCALC 179 (H) 10/27/2020    TRIG 52 10/27/2020    CHOLHDL 24.7 10/27/2020   , Troponin   Recent Labs   Lab 11/07/20 2231 11/08/20 0335 11/08/20  1050   TROPONINI <0.030 <0.030 <0.030   , A1C:    Recent Labs   Lab 09/09/20  0544 10/18/20  1916 10/27/20  0506   HGBA1C 5.0 5.5 4.9    and All labs within the past 24 hours have been reviewed    Pending Diagnostic Studies:     None         Medications:  Reconciled Home Medications:      Medication List      CHANGE how you take these medications    furosemide 20 MG tablet  Commonly known as: LASIX  Take 1 tablet (20 mg total) by mouth once daily. Take 20 mg once daily.  Can take additional dose for increasing shortness of breath as needed.  What changed: additional instructions     pantoprazole 20 MG tablet  Commonly known as: PROTONIX  Take 2 tablets (40 mg total) by mouth once daily.  What changed: how much to take        CONTINUE taking these medications    amLODIPine 5 MG tablet  Commonly known as: NORVASC  Take 2 tablets (10 mg total) by mouth once daily.     carvediloL 6.25 MG tablet  Commonly known as: COREG  Take 12.5 mg by mouth 2 (two) times daily with meals.     gabapentin 300 MG capsule  Commonly known as: NEURONTIN  Take 1 capsule (300 mg total) by mouth 3 (three) times daily.     lisinopriL 5 MG tablet  Commonly known as: PRINIVIL,ZESTRIL  Take 1 tablet (5 mg total) by mouth once daily.     ondansetron 4 MG Tbdl  Commonly known as: ZOFRAN ODT  Take 1 tablet (4 mg total) by mouth every 8 (eight) hours as needed (Nausea and Vomiting).     polyethylene glycol 17 gram Pwpk  Commonly known as: GLYCOLAX  Take 17 g by mouth 2 (two) times daily.     potassium chloride SA 20 MEQ tablet  Commonly known as: K-DUR,KLOR-CON  Take 1 tablet (20 mEq total) by mouth 2 (two) times daily.     propranoloL 10 MG tablet  Commonly known as: INDERAL  Take 10 mg by mouth 2 (two) times daily.     sucralfate 1 gram tablet  Commonly known as: CARAFATE  Take 1 g by mouth 4 (four) times daily.     traMADoL 50 mg tablet  Commonly known as: ULTRAM  Take 50 mg by mouth every 8 (eight) hours as needed for Pain.        X-ray Chest 1 View    Result Date: 10/27/2020  EXAMINATION: XR  CHEST 1 VIEW CLINICAL HISTORY: screening/rule out pneumonia/chest discomfort; TECHNIQUE: Single frontal view of the chest was performed. COMPARISON: 10/18/2020 FINDINGS: Cardiomediastinal silhouette within normal limits for age. Lungs grossly clear within limitations of portable technique Pleura, diaphragm, and thoracic cage grossly unremarkable.     No acute cardiopulmonary disease Electronically signed by: Thaddeus Wick Jr Date:    10/27/2020 Time:    08:23    Cta Chest Non-coronary - Pe Study    Result Date: 10/26/2020  EXAMINATION: CTA CHEST NON CORONARY CLINICAL HISTORY: PE suspected, high pretest prob; TECHNIQUE: Low dose axial images, sagittal and coronal reformations were obtained from the thoracic inlet to the lung bases following the IV administration of 100 mL of Omnipaque 350.  Contrast timing was optimized to evaluate the pulmonary arteries.  Maximum intensity projection images were provided for review. COMPARISON: CTA chest from 08/28/2018.  V/Q scan from 02/08/2019. FINDINGS: Pulmonary vasculature: Satisfactory opacification of the pulmonary arterial system with no filling defect to the segmental level. Aorta: Left-sided aortic arch.  No aneurysm and no significant atherosclerosis Base of Neck: No significant abnormality. Thoracic soft tissues: There is bilateral gynecomastia. Heart: Normal size. No effusion. Sierra/Mediastinum: No pathologic mandi enlargement. Airways: The large airways are patent. No foci of endobronchial filling. Lungs/Pleura: Clear lungs. No pleural effusion or thickening. Esophagus: Normal. Upper Abdomen: There are morphologic changes of chronic liver disease and findings of portal hypertension including mild abdominal ascites and upper abdominal varices.  Postop changes of prior partial colectomy.  Hepatic flexure anastomosis is unremarkable.  There is colonic diverticulosis. Bones: No acute fracture. No suspicious lytic or sclerotic lesions.     No evidence of pulmonary  embolism to the level of the segmental pulmonary arteries. Electronically signed by: Miquel Rodrigez Date:    10/26/2020 Time:    07:09    Mri Brain Without Contrast    Result Date: 10/18/2020  EXAMINATION: MRI BRAIN WITHOUT CONTRAST CLINICAL HISTORY: Neuro deficit, acute, persistent or progressing; TECHNIQUE: Multisequence, multiplanar imaging through the brain performed without IV contrast. COMPARISON: CT head without contrast 01/14/2020 FINDINGS: Diffusion-weighted imaging is negative for acute ischemia or focal lesion.  Gradient echo images demonstrate bilateral basal ganglia susceptibility artifact related to basal ganglia mineralization.  Bilateral and symmetric basal ganglia T1 hyperintensity.  No evidence of intracranial hemorrhage. No intracranial mass, midline shift, or mass effect.  Ventricles and sulci are normal in size.  No significant T2/FLAIR white matter signal abnormality.  Cerebellar hemispheres and brainstem are unremarkable.  Major intracranial T2 flow voids are patent. Visualized paranasal sinuses and mastoid air cells are clear.  Orbits are unremarkable. No bone marrow signal abnormality.  Pituitary gland is unremarkable.     Negative for acute ischemia or acute intracranial pathology. Bilateral basal ganglia T1 hyperintensity.  This is likely secondary to idiopathic calcification, but can also be seen in the setting of Malik's disease and other metabolic abnormalities. Electronically signed by: Shamar Mercado MD Date:    10/18/2020 Time:    11:46    Mri Cervical Spine Without Contrast    Result Date: 10/18/2020  EXAMINATION: MRI CERVICAL SPINE WITHOUT CONTRAST CLINICAL HISTORY: Neck pain, abnormal neuro exam; TECHNIQUE: Multisequence, multiplanar imaging of the cervical spine obtained without contrast. COMPARISON: No prior cervical spine imaging is available for comparison FINDINGS: Please see separately dictated brain CT for intracranial findings.  Craniocervical junction is intact.   Straightening of cervical spine with loss of normal lordotic curvature, potentially positional or muscle spasm related.  No prevertebral soft tissue swelling.  No cervical anterolisthesis or retrolisthesis. Mild reactive marrow changes involving the opposing C6-7 vertebral bodies.  No other bone marrow signal abnormality.  Paraspinal muscles appear normal.  No gross cervical soft tissue abnormality is evident. Normal morphology and signal intensity of the cervical spinal cord. C2-3: No significant abnormality. C3-4: Small bilateral paracentral disc protrusions which minimally narrow the spinal canal.  No neural foramen narrowing. C4-5: No significant abnormality. C5-6: Degenerative disc space loss.  Moderate broad-based posterior disc osteophyte complex which mildly narrows the spinal canal.  Central disc extrusion, with herniated disc tissue extending inferiorly along the posterior aspect of the C6 vertebral body.  Bilateral uncovertebral spurring which causes moderate bilateral neural foramen narrowing. C6-7: Degenerative disc space loss.  Moderate broad-based posterior disc osteophyte complex which mildly narrows the spinal canal.  Central disc extrusion extending superiorly, contiguous with the C5-6 disc extrusion extending inferiorly, along the posterior aspect of the C6 vertebral body.  Bilateral uncovertebral spurring causing moderate bilateral neural foramen narrowing, greater on the right. C7-T1: No significant abnormality.     Multilevel cervical spondylosis, most pronounced at C5-6 and C6-7.  There is moderate bilateral foraminal narrowing at these levels and mild spinal canal narrowing at these levels.  See details above. Electronically signed by: Shamar Mercado MD Date:    10/18/2020 Time:    12:07    Ct Abdomen Pelvis With Contrast    Result Date: 10/26/2020  EXAMINATION: CT ABDOMEN PELVIS WITH CONTRAST CLINICAL HISTORY: Abdominal distension; TECHNIQUE: Axial CT images with sagittal and coronal  reformats were obtained of the abdomen and pelvis from the hemidiaphragms through the symphysis pubis after the administration of 100mL Omnipaque 350. COMPARISON: CT abdomen and pelvis from 09/08/2020. FINDINGS: Lung Bases: Clear. Heart: Heart size is normal. No pericardial effusion. Liver: There are morphologic changes of hepatic cirrhosis including a nodular hepatic contour and enlargement of the left lateral segment and the caudate lobe.  There are findings of portal hypertension including upper abdominal varices and mild abdominal ascites.  There is recanalization of the paraumbilical vein.  There is no focal hepatic lesion. Biliary tract: No intrahepatic or extrahepatic biliary ductal dilatation. Gallbladder: No radiodense gallstone. No wall thickening or pericholecystic fluid.  There is gallbladder wall edema, likely reactive. Pancreas: Normal. No pancreatic ductal dilatation. Spleen: Normal. Adrenals: Normal. Kidneys and urinary collecting systems: Normal.  There is contrast within the bilateral renal collecting systems and ureters.  No hydronephrosis or urolithiasis. Lymph nodes: None enlarged. Stomach and bowel: The stomach is normal.  There is bowel wall thickening of the transverse colon likely related to portal colopathy.  There are postoperative changes of prior partial colectomy.  The right upper quadrant anastomosis is unremarkable.  There is colonic diverticulosis without evidence of acute diverticulitis. Peritoneum and mesentery: No ascites or free intraperitoneal air. No abdominal fluid collection. Vasculature: Normal. Urinary bladder: There is contrast within the urinary bladder. Reproductive organs: The prostate and seminal vesicles are unremarkable. Body wall: No abnormality. Musculoskeletal: There is a large left hip joint effusion with erosive changes and collapse of the left femoral head, likely related to longstanding avascular necrosis.  Appearance is unchanged.     1. Morphologic changes  of hepatic cirrhosis and findings of portal hypertension including mild abdominal ascites and upper abdominal varices. 2. Thickening of the transverse colon likely related to portal colopathy. 3. Colonic diverticulosis without evidence of acute diverticulitis. 4. Large left hip joint effusion with avascular necrosis of the left femoral head with subchondral collapse, similar in appearance to prior. Electronically signed by: Miquel Rodrigez Date:    10/26/2020 Time:    07:18    X-ray Chest Ap Portable    Result Date: 11/7/2020  EXAMINATION: XR CHEST AP PORTABLE CLINICAL HISTORY: CHF; COMPARISON: 10/27/2020 FINDINGS: Cardiomediastinal silhouette is upper limit of normal, and stable compared to prior.  Slight prominence of central pulmonary vascularity without kenia pulmonary edema.  No airspace consolidation.  No pleural effusion or pneumothorax.  No acute osseous abnormality.     Borderline enlarged heart size with findings of mild volume overload/vascular congestion, however no kenia pulmonary edema. Electronically signed by: Shamar Mercado MD Date:    11/07/2020 Time:    20:12    X-ray Chest Ap Portable    Result Date: 10/18/2020  EXAMINATION: XR CHEST AP PORTABLE CLINICAL HISTORY: arm pain; Pain in arm, unspecified COMPARISON: 04/08/2020 FINDINGS: Cardiac silhouette size is within normal limits.  No airspace consolidation.  No pleural effusion or pneumothorax.  No acute osseous abnormality.     No acute pulmonary process. Electronically signed by: Shamar Mercado MD Date:    10/18/2020 Time:    11:07    Us Lower Extremity Veins Bilateral    Result Date: 10/26/2020  EXAMINATION: US LOWER EXTREMITY VEINS BILATERAL CLINICAL HISTORY: Pain in leg, unspecified TECHNIQUE: Duplex and color flow Doppler and dynamic compression was performed of the bilateral lower extremity veins was performed. COMPARISON: Lower extremity ultrasound from 05/11/2019. FINDINGS: Right thigh veins: The common femoral, femoral, popliteal, upper  greater saphenous, and deep femoral veins are patent and free of thrombus. The veins are normally compressible and have normal phasic flow and augmentation response. Right calf veins: The visualized calf veins are patent. Left thigh veins: The common femoral, femoral, popliteal, upper greater saphenous, and deep femoral veins are patent and free of thrombus. The veins are normally compressible and have normal phasic flow and augmentation response. Left calf veins: The visualized calf veins are patent. Miscellaneous: None     No evidence of deep venous thrombosis in either lower extremity. Electronically signed by: Miquel Rodrigez Date:    10/26/2020 Time:    08:53    Us Upper Extremity Veins Left    Result Date: 10/18/2020  EXAMINATION: pain, tachycardia; CLINICAL HISTORY: pain, tachycardia; TECHNIQUE: Grayscale, color and spectral Doppler analysis of the left upper extremity deep venous system was performed. COMPARISON: None FINDINGS: There is normal compressibility, with normal flow by color and spectral Doppler analysis in the left upper extremity deep venous system, with no evidence of deep venous thrombosis.     Negative for left upper extremity DVT. Electronically signed by: Shamar Mercado MD Date:    10/18/2020 Time:    13:48    Nm Myocardial Perfusion Spect Multi Pharmacologic    Result Date: 11/8/2020  EXAMINATION: NM MYOCARDIAL PERFUSION SPECT MULTI PHARM CLINICAL HISTORY: Chest pain, normal EKG; hypertension, dyspnea, dizziness, palpitations. TECHNIQUE: 10.8 mCi technetium 99m tetrofosmin was administered IV for the rest portion of the exam, with 27 mCi for the stress portion of the exam, following a 1 day protocol. The patient was stressed with regadenoson 0.4 mg IV. FINDINGS: No prior studies for comparison.  There is normal and homogeneous radiotracer uptake by the left ventricular myocardium, with no photopenic defects to suggest pharmacologically induced reversible ischemia or infarct. There are no  wall motion abnormalities on the gated cine images, with no abnormal transient left ventricular dilatation on stress images. Ejection fraction is calculated at 67 %. The polar map images confirm the planar SPECT findings.     1.   No evidence of pharmacologically induced reversible ischemia or infarct. 2.   Normal left ventricular wall motion. 3.   Calculated ejection fraction of 67 %. Electronically signed by: Ron Bhatia MD Date:    11/08/2020 Time:    12:45      Indwelling Lines/Drains at time of discharge:   Lines/Drains/Airways     None                 Time spent on the discharge of patient: 31 minutes  Patient was seen and examined on the date of discharge and determined to be suitable for discharge.         Melisa Matute MD  Department of Hospital Medicine  formerly [facility]

## 2020-11-10 LAB
CV PHARM DOSE: 0.4 MG
CV STRESS BASE HR: 66 BPM
DIASTOLIC BLOOD PRESSURE: 86 MMHG
OHS CV CPX 85 PERCENT MAX PREDICTED HEART RATE MALE: 148
OHS CV CPX MAX PREDICTED HEART RATE: 174
OHS CV CPX PATIENT IS FEMALE: 0
OHS CV CPX PATIENT IS MALE: 1
OHS CV CPX PEAK DIASTOLIC BLOOD PRESSURE: 79 MMHG
OHS CV CPX PEAK HEAR RATE: 85 BPM
OHS CV CPX PEAK RATE PRESSURE PRODUCT: NORMAL
OHS CV CPX PEAK SYSTOLIC BLOOD PRESSURE: 145 MMHG
OHS CV CPX PERCENT MAX PREDICTED HEART RATE ACHIEVED: 49
OHS CV CPX RATE PRESSURE PRODUCT PRESENTING: 9174
SYSTOLIC BLOOD PRESSURE: 139 MMHG

## 2020-11-11 ENCOUNTER — HOSPITAL ENCOUNTER (OUTPATIENT)
Facility: HOSPITAL | Age: 45
Discharge: HOME | End: 2020-11-12
Attending: EMERGENCY MEDICINE | Admitting: INTERNAL MEDICINE
Payer: MEDICAID

## 2020-11-11 DIAGNOSIS — N17.9 AKI (ACUTE KIDNEY INJURY): Primary | ICD-10-CM

## 2020-11-11 DIAGNOSIS — R55 SYNCOPE, UNSPECIFIED SYNCOPE TYPE: ICD-10-CM

## 2020-11-11 DIAGNOSIS — R07.9 CHEST PAIN: ICD-10-CM

## 2020-11-11 DIAGNOSIS — R55 SYNCOPE: ICD-10-CM

## 2020-11-11 LAB
ALBUMIN SERPL BCP-MCNC: 3.5 G/DL (ref 3.5–5.2)
ALP SERPL-CCNC: 94 U/L (ref 55–135)
ALT SERPL W/O P-5'-P-CCNC: 44 U/L (ref 10–44)
ANION GAP SERPL CALC-SCNC: 13 MMOL/L (ref 8–16)
APTT PPP: 32.6 SEC (ref 23.6–33.3)
AST SERPL-CCNC: 93 U/L (ref 10–40)
BASOPHILS # BLD AUTO: 0.03 K/UL (ref 0–0.2)
BASOPHILS NFR BLD: 0.4 % (ref 0–1.9)
BILIRUB SERPL-MCNC: 3.3 MG/DL (ref 0.1–1)
BILIRUB UR QL STRIP: NEGATIVE
BNP SERPL-MCNC: 34 PG/ML (ref 0–99)
BUN SERPL-MCNC: 29 MG/DL (ref 6–20)
CALCIUM SERPL-MCNC: 8.7 MG/DL (ref 8.7–10.5)
CHLORIDE SERPL-SCNC: 100 MMOL/L (ref 95–110)
CLARITY UR: CLEAR
CO2 SERPL-SCNC: 24 MMOL/L (ref 23–29)
COLOR UR: YELLOW
CREAT SERPL-MCNC: 2.4 MG/DL (ref 0.5–1.4)
DIFFERENTIAL METHOD: ABNORMAL
EOSINOPHIL # BLD AUTO: 0.2 K/UL (ref 0–0.5)
EOSINOPHIL NFR BLD: 1.9 % (ref 0–8)
ERYTHROCYTE [DISTWIDTH] IN BLOOD BY AUTOMATED COUNT: 15.2 % (ref 11.5–14.5)
EST. GFR  (AFRICAN AMERICAN): 36 ML/MIN/1.73 M^2
EST. GFR  (NON AFRICAN AMERICAN): 31.2 ML/MIN/1.73 M^2
GLUCOSE SERPL-MCNC: 120 MG/DL (ref 70–110)
GLUCOSE SERPL-MCNC: 132 MG/DL (ref 70–110)
GLUCOSE UR QL STRIP: NEGATIVE
HCT VFR BLD AUTO: 29.7 % (ref 40–54)
HGB BLD-MCNC: 10.3 G/DL (ref 14–18)
HGB UR QL STRIP: NEGATIVE
IMM GRANULOCYTES # BLD AUTO: 0.02 K/UL (ref 0–0.04)
IMM GRANULOCYTES NFR BLD AUTO: 0.2 % (ref 0–0.5)
INR PPP: 1.2
KETONES UR QL STRIP: NEGATIVE
LEUKOCYTE ESTERASE UR QL STRIP: NEGATIVE
LYMPHOCYTES # BLD AUTO: 2.1 K/UL (ref 1–4.8)
LYMPHOCYTES NFR BLD: 24.9 % (ref 18–48)
MAGNESIUM SERPL-MCNC: 1.9 MG/DL (ref 1.6–2.6)
MCH RBC QN AUTO: 36.1 PG (ref 27–31)
MCHC RBC AUTO-ENTMCNC: 34.7 G/DL (ref 32–36)
MCV RBC AUTO: 104 FL (ref 82–98)
MONOCYTES # BLD AUTO: 0.7 K/UL (ref 0.3–1)
MONOCYTES NFR BLD: 8.3 % (ref 4–15)
NEUTROPHILS # BLD AUTO: 5.4 K/UL (ref 1.8–7.7)
NEUTROPHILS NFR BLD: 64.3 % (ref 38–73)
NITRITE UR QL STRIP: NEGATIVE
NRBC BLD-RTO: 0 /100 WBC
PH UR STRIP: 6 [PH] (ref 5–8)
PLATELET # BLD AUTO: 77 K/UL (ref 150–350)
PMV BLD AUTO: 10.9 FL (ref 9.2–12.9)
POTASSIUM SERPL-SCNC: 3.7 MMOL/L (ref 3.5–5.1)
PROT SERPL-MCNC: 8.1 G/DL (ref 6–8.4)
PROT UR QL STRIP: NEGATIVE
PROTHROMBIN TIME: 14.9 SEC (ref 10.6–14.8)
RBC # BLD AUTO: 2.85 M/UL (ref 4.6–6.2)
SARS-COV-2 RDRP RESP QL NAA+PROBE: NEGATIVE
SODIUM SERPL-SCNC: 137 MMOL/L (ref 136–145)
SP GR UR STRIP: 1.01 (ref 1–1.03)
TROPONIN I SERPL DL<=0.01 NG/ML-MCNC: <0.03 NG/ML
URN SPEC COLLECT METH UR: ABNORMAL
UROBILINOGEN UR STRIP-ACNC: ABNORMAL EU/DL
WBC # BLD AUTO: 8.43 K/UL (ref 3.9–12.7)

## 2020-11-11 PROCEDURE — 81003 URINALYSIS AUTO W/O SCOPE: CPT

## 2020-11-11 PROCEDURE — 93010 EKG 12-LEAD: ICD-10-PCS | Mod: ,,, | Performed by: INTERNAL MEDICINE

## 2020-11-11 PROCEDURE — 93010 ELECTROCARDIOGRAM REPORT: CPT | Mod: ,,, | Performed by: INTERNAL MEDICINE

## 2020-11-11 PROCEDURE — 82962 GLUCOSE BLOOD TEST: CPT

## 2020-11-11 PROCEDURE — 93005 ELECTROCARDIOGRAM TRACING: CPT | Performed by: INTERNAL MEDICINE

## 2020-11-11 PROCEDURE — 99900035 HC TECH TIME PER 15 MIN (STAT)

## 2020-11-11 PROCEDURE — 83880 ASSAY OF NATRIURETIC PEPTIDE: CPT

## 2020-11-11 PROCEDURE — U0002 COVID-19 LAB TEST NON-CDC: HCPCS

## 2020-11-11 PROCEDURE — 85610 PROTHROMBIN TIME: CPT

## 2020-11-11 PROCEDURE — 25000003 PHARM REV CODE 250: Performed by: INTERNAL MEDICINE

## 2020-11-11 PROCEDURE — 94761 N-INVAS EAR/PLS OXIMETRY MLT: CPT

## 2020-11-11 PROCEDURE — 80053 COMPREHEN METABOLIC PANEL: CPT

## 2020-11-11 PROCEDURE — 84484 ASSAY OF TROPONIN QUANT: CPT

## 2020-11-11 PROCEDURE — 85025 COMPLETE CBC W/AUTO DIFF WBC: CPT

## 2020-11-11 PROCEDURE — G0378 HOSPITAL OBSERVATION PER HR: HCPCS

## 2020-11-11 PROCEDURE — 85730 THROMBOPLASTIN TIME PARTIAL: CPT

## 2020-11-11 PROCEDURE — 99285 EMERGENCY DEPT VISIT HI MDM: CPT | Mod: 25

## 2020-11-11 PROCEDURE — 63600175 PHARM REV CODE 636 W HCPCS: Performed by: INTERNAL MEDICINE

## 2020-11-11 PROCEDURE — 83735 ASSAY OF MAGNESIUM: CPT

## 2020-11-11 PROCEDURE — 96374 THER/PROPH/DIAG INJ IV PUSH: CPT

## 2020-11-11 RX ORDER — GABAPENTIN 300 MG/1
300 CAPSULE ORAL 3 TIMES DAILY
Status: DISCONTINUED | OUTPATIENT
Start: 2020-11-11 | End: 2020-11-11

## 2020-11-11 RX ORDER — ONDANSETRON 2 MG/ML
4 INJECTION INTRAMUSCULAR; INTRAVENOUS EVERY 8 HOURS PRN
Status: DISCONTINUED | OUTPATIENT
Start: 2020-11-11 | End: 2020-11-12 | Stop reason: HOSPADM

## 2020-11-11 RX ORDER — NIFEDIPINE 90 MG/1
60 TABLET, EXTENDED RELEASE ORAL DAILY
Status: ON HOLD | COMMUNITY
End: 2021-12-20

## 2020-11-11 RX ORDER — GABAPENTIN 100 MG/1
100 CAPSULE ORAL 3 TIMES DAILY
Status: DISCONTINUED | OUTPATIENT
Start: 2020-11-11 | End: 2020-11-12 | Stop reason: HOSPADM

## 2020-11-11 RX ORDER — TRAMADOL HYDROCHLORIDE 50 MG/1
50 TABLET ORAL EVERY 8 HOURS PRN
Status: DISCONTINUED | OUTPATIENT
Start: 2020-11-11 | End: 2020-11-12 | Stop reason: HOSPADM

## 2020-11-11 RX ORDER — NIFEDIPINE 30 MG/1
60 TABLET, EXTENDED RELEASE ORAL DAILY
Status: DISCONTINUED | OUTPATIENT
Start: 2020-11-12 | End: 2020-11-12 | Stop reason: HOSPADM

## 2020-11-11 RX ORDER — TALC
6 POWDER (GRAM) TOPICAL NIGHTLY PRN
Status: DISCONTINUED | OUTPATIENT
Start: 2020-11-11 | End: 2020-11-12 | Stop reason: HOSPADM

## 2020-11-11 RX ORDER — ACETAMINOPHEN 325 MG/1
650 TABLET ORAL EVERY 4 HOURS PRN
Status: DISCONTINUED | OUTPATIENT
Start: 2020-11-11 | End: 2020-11-12 | Stop reason: HOSPADM

## 2020-11-11 RX ORDER — ACETAMINOPHEN 325 MG/1
650 TABLET ORAL EVERY 8 HOURS PRN
Status: DISCONTINUED | OUTPATIENT
Start: 2020-11-11 | End: 2020-11-12 | Stop reason: HOSPADM

## 2020-11-11 RX ORDER — PROPRANOLOL HYDROCHLORIDE 10 MG/1
10 TABLET ORAL 2 TIMES DAILY
Status: DISCONTINUED | OUTPATIENT
Start: 2020-11-11 | End: 2020-11-12 | Stop reason: HOSPADM

## 2020-11-11 RX ADMIN — ONDANSETRON 4 MG: 2 INJECTION INTRAMUSCULAR; INTRAVENOUS at 10:11

## 2020-11-11 RX ADMIN — PROPRANOLOL HYDROCHLORIDE 10 MG: 10 TABLET ORAL at 08:11

## 2020-11-11 RX ADMIN — TRAMADOL HYDROCHLORIDE 50 MG: 50 TABLET, FILM COATED ORAL at 06:11

## 2020-11-11 RX ADMIN — GABAPENTIN 100 MG: 100 CAPSULE ORAL at 08:11

## 2020-11-11 NOTE — ED PROVIDER NOTES
Encounter Date: 11/11/2020       History     Chief Complaint   Patient presents with    NEAR SYNCOPE     X 2 EVENTS YESTERDAY, GOT FAINT AND FELL TO GROUND, NO LOC     Patient with reported 2 episodes of feeling lightheaded 1 likely episode of syncope patient was briefly unconscious did both episodes occurred while standing she he he denies any chest pain does report some palpitations he states that he had recent admission for hypertensive crisis and his blood pressure medication was changed at that time he has a history of congestive heart failure but denies any worsened shortness of breath denies any fever chills states he has been eating well since his discharge denies any vomiting or diarrhea        Review of patient's allergies indicates:   Allergen Reactions    Ciprofloxacin hcl Hallucinations    Morphine Itching     Past Medical History:   Diagnosis Date    Alcoholic cirrhosis of liver     Arthritis     ATN (acute tubular necrosis)     CHF (congestive heart failure)     Diverticulitis 01/2020    GERD (gastroesophageal reflux disease)     GI bleed     Hip arthritis     Left    Hypertension     Macrocytic anemia     Pulmonary embolism 08/2018    Unprovoked DVT.  Stop Coumadin due to GIB    Thrombocytopenia      Past Surgical History:   Procedure Laterality Date    ANKLE SURGERY      COLON SURGERY  2007    COLONOS[facility]  09/05/2019    [facility]    ESOPHAGOGASTRODUODENOSCOPY N/A 7/26/2019    Procedure: EGD (ESOPHAGOGASTRODUODENOSCOPY);  Surgeon: Brian Trivedi MD;  Location: [facility];  Service: Endoscopy;  Laterality: N/A;    ESOPHAGOGASTRODUODENOSCOPY N/A 4/8/2020    Procedure: EGD (ESOPHAGOGASTRODUODENOSCOPY);  Surgeon: Donn Acuna MD;  Location: [facility];  Service: Endoscopy;  Laterality: N/A;    HERNIA REPAIR Left     Inguinal     Family History   Problem Relation Age of Onset    Hypertension Mother     Breast cancer Mother     Hypertension Father     Prostate cancer Father      Bladder Cancer Father      Social History     Tobacco Use    Smoking status: Former Smoker     Quit date: 2000     Years since quittin.8    Smokeless tobacco: Never Used    Tobacco comment: quit 20 years ago   Substance Use Topics    Alcohol use: Not Currently     Comment: freq    Drug use: Not Currently     Types: Marijuana     Review of Systems   Constitutional: Negative for chills and fever.   HENT: Negative for congestion, ear pain, rhinorrhea and sore throat.    Eyes: Negative for discharge.   Respiratory: Negative for cough and shortness of breath.    Cardiovascular: Positive for palpitations. Negative for chest pain and leg swelling.   Gastrointestinal: Negative for abdominal pain, blood in stool, constipation, diarrhea, nausea and vomiting.   Genitourinary: Negative for dysuria and flank pain.   Musculoskeletal: Negative for myalgias.   Skin: Negative for rash.   Neurological: Positive for syncope and light-headedness. Negative for headaches.   Hematological: Negative for adenopathy.       Physical Exam     Initial Vitals [20 0833]   BP Pulse Resp Temp SpO2   133/67 86 20 98.6 °F (37 °C) 96 %      MAP       --         Physical Exam    Constitutional: He appears well-developed and well-nourished. No distress.   HENT:   Head: Normocephalic and atraumatic.   Right Ear: External ear normal.   Left Ear: External ear normal.   Mouth/Throat: Oropharynx is clear and moist.   Eyes: Conjunctivae and EOM are normal. Pupils are equal, round, and reactive to light.   Neck: Normal range of motion. Neck supple.   Cardiovascular: Normal rate, regular rhythm, S1 normal, S2 normal, normal heart sounds and intact distal pulses.   Pulmonary/Chest: Breath sounds normal. No respiratory distress.   Abdominal: Soft. Normal appearance and bowel sounds are normal. There is no abdominal tenderness.   Musculoskeletal: Normal range of motion.   Neurological: He is alert and oriented to person, place, and time. He has  normal strength. No cranial nerve deficit. GCS score is 15. GCS eye subscore is 4. GCS verbal subscore is 5. GCS motor subscore is 6.   Skin: Skin is warm and dry. Capillary refill takes less than 2 seconds. No rash noted.   Psychiatric: He has a normal mood and affect. His behavior is normal.         ED Course   Procedures  Labs Reviewed   CBC W/ AUTO DIFFERENTIAL - Abnormal; Notable for the following components:       Result Value    RBC 2.85 (*)     Hemoglobin 10.3 (*)     Hematocrit 29.7 (*)      (*)     MCH 36.1 (*)     RDW 15.2 (*)     Platelets 77 (*)     All other components within normal limits   COMPREHENSIVE METABOLIC PANEL - Abnormal; Notable for the following components:    Glucose 120 (*)     BUN 29 (*)     Creatinine 2.4 (*)     Total Bilirubin 3.3 (*)     AST 93 (*)     eGFR if  36.0 (*)     eGFR if non  31.2 (*)     All other components within normal limits   PROTIME-INR - Abnormal; Notable for the following components:    PT 14.9 (*)     All other components within normal limits   URINALYSIS, REFLEX TO URINE CULTURE - Abnormal; Notable for the following components:    Urobilinogen, UA 2.0-3.0 (*)     All other components within normal limits    Narrative:     Specimen Source->Urine   POCT GLUCOSE - Abnormal; Notable for the following components:    POC Glucose 132 (*)     All other components within normal limits   APTT   MAGNESIUM   TROPONIN I   B-TYPE NATRIURETIC PEPTIDE   SARS-COV-2 RNA AMPLIFICATION, QUAL   POCT GLUCOSE MONITORING CONTINUOUS        ECG Results          EKG 12-lead (In process)  Result time 11/11/20 09:49:31    In process by Interface, Lab In [facility] (11/11/20 09:49:31)                 Narrative:    Test Reason : R55,    Vent. Rate : 085 BPM     Atrial Rate : 085 BPM     P-R Int : 142 ms          QRS Dur : 088 ms      QT Int : 396 ms       P-R-T Axes : 050 057 024 degrees     QTc Int : 471 ms    Normal sinus rhythm  Normal ECG  When  compared with ECG of 07-NOV-2020 19:28,  No significant change was found    Referred By: [email or web address]   SELF           Confirmed By:                             Imaging Results          X-Ray Chest AP Portable (Final result)  Result time 11/11/20 08:56:33    Final result by Nadege Rios MD (11/11/20 08:56:33)                 Narrative:    Portable chest x-ray at 8:36 AM is compared to prior study dated  11/7/2020    Clinical history is syncope    The heart is enlarged. The lungs are clear. There are no acute osseous  abnormalities.    IMPRESSION: Mild cardiomegaly with no acute pulmonary process    Electronically Signed by Nadege Rios M.D. on 11/11/2020 9:01 AM                               Medical Decision Making:   ED Management:  Patient here with reported 2 episodes of syncope recent admission for chest pain hypertensive urgency change of his blood pressure medications he does have acute renal injury laboratory evaluation today I have discussed the case with  who has asked that we obtain a CT scan the patient's head given his recurrent syncopal events and obtain a bladder scan                             Clinical Impression:       ICD-10-CM ICD-9-CM   1. ENA (acute kidney injury)  N17.9 584.9   2. Syncope  R55 780.2                          ED Disposition Condition    Admit                             Wilfredo Mcdonald MD  11/11/20 [id number]

## 2020-11-11 NOTE — H&P
[facility] Medicine History & Physical Examination   Patient Name: Irvin Diaz  MRN: [id number]  Patient Class: OP- Observation   Admission Date: 11/11/2020  8:35 AM  Length of Stay: 0  Attending Physician: Yajaira Sam MD  Primary Care Provider: [facility]  Face-to-Face encounter date: 11/11/2020  Code Status: Full  Chief Complaint: NEAR SYNCOPE (X 2 EVENTS YESTERDAY, GOT FAINT AND FELL TO GROUND, NO LOC)      Covid test negative       Patient information was obtained from patient, past medical records and ER records and ED physician sign out.   HISTORY OF PRESENT ILLNESS:   Irvin Diaz is a 45 y.o.  male who  has a past medical history of Alcoholic cirrhosis of liver, CHF ,GERD  Hypertension, Macrocytic anemia, Pulmonary embolism (08/2018), and Thrombocytopenia.. The patient presented to [facility] on 11/11/2020 with a primary complaint of NEAR SYNCOPE (X 2 EVENTS YESTERDAY, GOT FAINT AND FELL TO GROUND, NO LOC)   Of note patient was recently discharged 3 days ago after evaluation of chest pain ischemic workup with negative stress test .   Patient describes 2 episodes of syncopal events yesterday, around noon, upon opening his kitchen cabinet, he felt Black out, and fell on the for floor, unwitnessed, another episode of near-syncope , son helped him to prevent fall.  He denies any chest pain palpitation short of breath associated with this episode.  Denies any abdominal distension lower extremity edema or recent GI bleed.  Patient following GI ,Reggie Wilcox MD   S/P Variceal banding 2 months ago.  Patient attributes his symptoms due to polypharmacy, medications adjusted last admission for uncontrolled blood pressure.  Feels confused with frequent change of medication adjustment in and out of multiple hospital admissions.   On arrival to ED blood pressure stable, no orthostatic hypotension, CT head and chest x-ray  unremarkable  Labs positive for ENA, hemoglobin stable at baseline, normal sinus rhythm on EKG without ischemic changes , thrombocytopenia, mild elevation of bilirubin and AST appears chronic    REVIEW OF SYSTEMS:   10 Point Review of System was performed and was found to be negative except for that mentioned already in the HPI above.     PAST MEDICAL HISTORY:     Past Medical History:   Diagnosis Date    Alcoholic cirrhosis of liver     Arthritis     ATN (acute tubular necrosis)     CHF (congestive heart failure)     Diverticulitis 2020    GERD (gastroesophageal reflux disease)     GI bleed     Hip arthritis     Left    Hypertension     Macrocytic anemia     Pulmonary embolism 2018    Unprovoked DVT.  Stop Coumadin due to GIB    Thrombocytopenia        PAST SURGICAL HISTORY:     Past Surgical History:   Procedure Laterality Date    ANKLE SURGERY      COLON SURGERY      [facility]COPY  2019    [facility]    ESOPHAGOGASTRODUODENOSCOPY N/A 2019    Procedure: EGD (ESOPHAGOGASTRODUODENOSCOPY);  Surgeon: Brian Trivedi MD;  Location: [facility];  Service: Endoscopy;  Laterality: N/A;    ESOPHAGOGASTRODUODENOSCOPY N/A 2020    Procedure: EGD (ESOPHAGOGASTRODUODENOSCOPY);  Surgeon: Donn Acuna MD;  Location: [facility];  Service: Endoscopy;  Laterality: N/A;    HERNIA REPAIR Left     Inguinal       ALLERGIES:   Ciprofloxacin hcl and Morphine    FAMILY HISTORY:     Family History   Problem Relation Age of Onset    Hypertension Mother     Breast cancer Mother     Hypertension Father     Prostate cancer Father     Bladder Cancer Father        SOCIAL HISTORY:     Social History     Tobacco Use    Smoking status: Former Smoker     Quit date:      Years since quittin.8    Smokeless tobacco: Never Used    Tobacco comment: quit 20 years ago   Substance Use Topics    Alcohol use: Not Currently     Comment: freq        Social History     Substance and Sexual Activity   Sexual  Activity Yes    Comment: occ        HOME MEDICATIONS:     Prior to Admission medications    Medication Sig Start Date End Date Taking? Authorizing Provider   carvediloL (COREG) 6.25 MG tablet Take 6.25 mg by mouth 2 (two) times daily with meals.    Yes Historical Provider   furosemide (LASIX) 20 MG tablet Take 1 tablet (20 mg total) by mouth once daily. Take 20 mg once daily.  Can take additional dose for increasing shortness of breath as needed. 11/8/20 11/8/21 Yes Melisa Matute MD   gabapentin (NEURONTIN) 300 MG capsule Take 1 capsule (300 mg total) by mouth 3 (three) times daily. 10/21/20 10/21/21 Yes Leanne Park MD   lisinopriL (PRINIVIL,ZESTRIL) 5 MG tablet Take 1 tablet (5 mg total) by mouth once daily. 10/27/20 10/27/21 Yes Spencer Doll MD   NIFEdipine (PROCARDIA-XL) 60 MG (OSM) 24 hr tablet Take 60 mg by mouth once daily.   Yes Historical Provider   ondansetron (ZOFRAN ODT) 4 MG TbDL Take 1 tablet (4 mg total) by mouth every 8 (eight) hours as needed (Nausea and Vomiting). 9/3/20  Yes Edouard Haji MD   pantoprazole (PROTONIX) 20 MG tablet Take 2 tablets (40 mg total) by mouth once daily. 11/8/20  Yes Melisa Matute MD   potassium chloride SA (K-DUR,KLOR-CON) 20 MEQ tablet Take 1 tablet (20 mEq total) by mouth 2 (two) times daily. 10/27/20  Yes Spencer Doll MD   propranoloL (INDERAL) 10 MG tablet Take 10 mg by mouth 2 (two) times daily.   Yes Historical Provider   traMADoL (ULTRAM) 50 mg tablet Take 50 mg by mouth every 8 (eight) hours as needed for Pain.   Yes Historical Provider   amLODIPine (NORVASC) 5 MG tablet Take 2 tablets (10 mg total) by mouth once daily. 4/11/20 4/11/21  Ayah Whitney MD   polyethylene glycol (GLYCOLAX) 17 gram PwPk Take 17 g by mouth 2 (two) times daily. 9/9/20   Jeff Marquez MD   sucralfate (CARAFATE) 1 gram tablet Take 1 g by mouth 4 (four) times daily.    Historical Provider         PHYSICAL EXAM:   /80 (BP Location: Right arm,  Patient Position: Lying)   Pulse 88   Temp 98.5 °F (36.9 °C) (Oral)   Resp 19   Ht 6' (1.829 m)   Wt 83.9 kg (184 lb 15.5 oz)   SpO2 100%   BMI 25.09 kg/m²   Vitals Reviewed  General appearance: Well-developed, well-nourished male in no apparent distress.  Skin: No Rash.   Neuro: Motor and sensory exams grossly intact. Good tone. Power in all 4 extremities 5/5.   HENT: Atraumatic head. Moist mucous membranes of oral cavity.  Eyes: Normal extraocular movements.   Neck: Supple. No evidence of lymphadenopathy. No thyroidomegaly.  Lungs: Clear to auscultation bilaterally. No wheezing present.   Heart: Regular rate and rhythm. S1 and S2 present with no murmurs/gallop/rub. No pedal edema. No JVD present.   Abdomen: Soft, non-distended, non-tender. No rebound tenderness/guarding. No masses or organomegaly. Bowel sounds are normal. Bladder is not palpable.  :no boyce ,no CVA tenderness  Extremities: No cyanosis, clubbing, or edema.  Psych/mental status: Alert and oriented. Cooperative. Responds appropriately to questions.     EMERGENCY DEPARTMENT LABS AND IMAGING:     Labs Reviewed   CBC W/ AUTO DIFFERENTIAL - Abnormal; Notable for the following components:       Result Value    RBC 2.85 (*)     Hemoglobin 10.3 (*)     Hematocrit 29.7 (*)      (*)     MCH 36.1 (*)     RDW 15.2 (*)     Platelets 77 (*)     All other components within normal limits   COMPREHENSIVE METABOLIC PANEL - Abnormal; Notable for the following components:    Glucose 120 (*)     BUN 29 (*)     Creatinine 2.4 (*)     Total Bilirubin 3.3 (*)     AST 93 (*)     eGFR if  36.0 (*)     eGFR if non  31.2 (*)     All other components within normal limits   PROTIME-INR - Abnormal; Notable for the following components:    PT 14.9 (*)     All other components within normal limits   URINALYSIS, REFLEX TO URINE CULTURE - Abnormal; Notable for the following components:    Urobilinogen, UA 2.0-3.0 (*)     All other  components within normal limits    Narrative:     Specimen Source->Urine   POCT GLUCOSE - Abnormal; Notable for the following components:    POC Glucose 132 (*)     All other components within normal limits   APTT   MAGNESIUM   TROPONIN I   B-TYPE NATRIURETIC PEPTIDE   SARS-COV-2 RNA AMPLIFICATION, QUAL   POCT GLUCOSE MONITORING CONTINUOUS       CT Head Without Contrast   Final Result      X-Ray Chest AP Portable   Final Result          ASSESSMENT & PLAN:   Irvin Diaz is a 45 y.o. male admitted for    Active Hospital Problems    Diagnosis  POA    *Syncope [R55]  Unknown    Macrocytic anemia [D53.9]  Yes    ENA (acute kidney injury) [N17.9]  Yes    Thrombocytopenia [D69.6]  Yes      Resolved Hospital Problems   No resolved problems to display.         Plan:  Admit to telemetry  Trend cardiac enzyme  Recent echo 2 months ago negative for any structural abnormalities  With preserved EF  Mild TR  Will hold Coreg, Lasix, renally dosed gabapentin  Restart home meds  Monitor intake and output, bladder scan prn  Cardiology consult p.r.n.  Monitor blood pressure, hemoglobin  Orthostatic blood pressure q12      DVT Prophylaxis: will be placed on Lovenox for DVT prophylaxis and will be advised to be as mobile as possible and sit in a chair as tolerated.     INPATIENT LIST OF MEDICATIONS     Current Facility-Administered Medications:     acetaminophen tablet 650 mg, 650 mg, Oral, Q4H PRN, Yajaira Sam MD    acetaminophen tablet 650 mg, 650 mg, Oral, Q8H PRN, Yajaira Sam MD    melatonin tablet 6 mg, 6 mg, Oral, Nightly PRN, Yajaira Sam MD    ondansetron injection 4 mg, 4 mg, Intravenous, Q8H PRN, Yajaira Sam MD      Scheduled Meds:  Continuous Infusions:  PRN Meds:.acetaminophen, acetaminophen, melatonin, ondansetron      Yajaira Sam  [facility] Hospitalist  11/11/2020

## 2020-11-12 VITALS
BODY MASS INDEX: 25.05 KG/M2 | TEMPERATURE: 98 F | SYSTOLIC BLOOD PRESSURE: 146 MMHG | DIASTOLIC BLOOD PRESSURE: 88 MMHG | OXYGEN SATURATION: 100 % | HEART RATE: 64 BPM | RESPIRATION RATE: 16 BRPM | WEIGHT: 184.94 LBS | HEIGHT: 72 IN

## 2020-11-12 PROBLEM — N17.9 AKI (ACUTE KIDNEY INJURY): Status: RESOLVED | Noted: 2020-01-09 | Resolved: 2020-11-12

## 2020-11-12 LAB
ANION GAP SERPL CALC-SCNC: 11 MMOL/L (ref 8–16)
BASOPHILS # BLD AUTO: 0.03 K/UL (ref 0–0.2)
BASOPHILS NFR BLD: 0.4 % (ref 0–1.9)
BUN SERPL-MCNC: 18 MG/DL (ref 6–20)
CALCIUM SERPL-MCNC: 9.1 MG/DL (ref 8.7–10.5)
CHLORIDE SERPL-SCNC: 98 MMOL/L (ref 95–110)
CO2 SERPL-SCNC: 26 MMOL/L (ref 23–29)
CREAT SERPL-MCNC: 1.2 MG/DL (ref 0.5–1.4)
DIFFERENTIAL METHOD: ABNORMAL
EOSINOPHIL # BLD AUTO: 0.2 K/UL (ref 0–0.5)
EOSINOPHIL NFR BLD: 2 % (ref 0–8)
ERYTHROCYTE [DISTWIDTH] IN BLOOD BY AUTOMATED COUNT: 15.3 % (ref 11.5–14.5)
EST. GFR  (AFRICAN AMERICAN): >60 ML/MIN/1.73 M^2
EST. GFR  (NON AFRICAN AMERICAN): >60 ML/MIN/1.73 M^2
ESTIMATED AVG GLUCOSE: 117 MG/DL (ref 68–131)
GLUCOSE SERPL-MCNC: 101 MG/DL (ref 70–110)
HBA1C MFR BLD HPLC: 5.7 % (ref 4.5–6.2)
HCT VFR BLD AUTO: 31.1 % (ref 40–54)
HGB BLD-MCNC: 10.5 G/DL (ref 14–18)
IMM GRANULOCYTES # BLD AUTO: 0.01 K/UL (ref 0–0.04)
IMM GRANULOCYTES NFR BLD AUTO: 0.1 % (ref 0–0.5)
LYMPHOCYTES # BLD AUTO: 1.9 K/UL (ref 1–4.8)
LYMPHOCYTES NFR BLD: 25.5 % (ref 18–48)
MAGNESIUM SERPL-MCNC: 2.1 MG/DL (ref 1.6–2.6)
MCH RBC QN AUTO: 35.6 PG (ref 27–31)
MCHC RBC AUTO-ENTMCNC: 33.8 G/DL (ref 32–36)
MCV RBC AUTO: 105 FL (ref 82–98)
MONOCYTES # BLD AUTO: 0.9 K/UL (ref 0.3–1)
MONOCYTES NFR BLD: 12.6 % (ref 4–15)
NEUTROPHILS # BLD AUTO: 4.4 K/UL (ref 1.8–7.7)
NEUTROPHILS NFR BLD: 59.4 % (ref 38–73)
NRBC BLD-RTO: 0 /100 WBC
PLATELET # BLD AUTO: 76 K/UL (ref 150–350)
PMV BLD AUTO: 11.2 FL (ref 9.2–12.9)
POTASSIUM SERPL-SCNC: 3.5 MMOL/L (ref 3.5–5.1)
RBC # BLD AUTO: 2.95 M/UL (ref 4.6–6.2)
SODIUM SERPL-SCNC: 135 MMOL/L (ref 136–145)
TROPONIN I SERPL DL<=0.01 NG/ML-MCNC: <0.03 NG/ML
WBC # BLD AUTO: 7.38 K/UL (ref 3.9–12.7)

## 2020-11-12 PROCEDURE — 85025 COMPLETE CBC W/AUTO DIFF WBC: CPT

## 2020-11-12 PROCEDURE — 83735 ASSAY OF MAGNESIUM: CPT

## 2020-11-12 PROCEDURE — 99205 OFFICE O/P NEW HI 60 MIN: CPT | Mod: ,,, | Performed by: INTERNAL MEDICINE

## 2020-11-12 PROCEDURE — 36415 COLL VENOUS BLD VENIPUNCTURE: CPT

## 2020-11-12 PROCEDURE — 80048 BASIC METABOLIC PNL TOTAL CA: CPT

## 2020-11-12 PROCEDURE — 25000003 PHARM REV CODE 250: Performed by: INTERNAL MEDICINE

## 2020-11-12 PROCEDURE — 83036 HEMOGLOBIN GLYCOSYLATED A1C: CPT

## 2020-11-12 PROCEDURE — 94761 N-INVAS EAR/PLS OXIMETRY MLT: CPT

## 2020-11-12 PROCEDURE — 84484 ASSAY OF TROPONIN QUANT: CPT

## 2020-11-12 PROCEDURE — G0378 HOSPITAL OBSERVATION PER HR: HCPCS

## 2020-11-12 PROCEDURE — 99900035 HC TECH TIME PER 15 MIN (STAT)

## 2020-11-12 PROCEDURE — 99205 PR OFFICE/OUTPT VISIT, NEW, LEVL V, 60-74 MIN: ICD-10-PCS | Mod: ,,, | Performed by: INTERNAL MEDICINE

## 2020-11-12 RX ORDER — FUROSEMIDE 20 MG/1
20 TABLET ORAL
Qty: 30 TABLET | Refills: 11 | Status: ON HOLD
Start: 2020-11-12 | End: 2020-11-22 | Stop reason: HOSPADM

## 2020-11-12 RX ADMIN — ACETAMINOPHEN 650 MG: 325 TABLET ORAL at 08:11

## 2020-11-12 RX ADMIN — NIFEDIPINE 60 MG: 30 TABLET, FILM COATED, EXTENDED RELEASE ORAL at 09:11

## 2020-11-12 RX ADMIN — TRAMADOL HYDROCHLORIDE 50 MG: 50 TABLET, FILM COATED ORAL at 04:11

## 2020-11-12 RX ADMIN — PROPRANOLOL HYDROCHLORIDE 10 MG: 10 TABLET ORAL at 09:11

## 2020-11-12 RX ADMIN — TRAMADOL HYDROCHLORIDE 50 MG: 50 TABLET, FILM COATED ORAL at 12:11

## 2020-11-12 RX ADMIN — GABAPENTIN 100 MG: 100 CAPSULE ORAL at 09:11

## 2020-11-12 NOTE — HOSPITAL COURSE
Patient was admitted for the evaluation of syncope, admitted to telemetry, no arrhythmias reported, no orthostatic blood pressure drop, troponin x2 negative  Patient reports mild dizziness, blood pressure stable, cardiology consult appreciated, patient had recent stress test which was normal  Medications reconciled, advised to use Lasix as needed only and hold for now, discontinue taking amlodipine and Coreg  His blood pressure well controlled with nifedipine and propranolol alone  ENA resolved, encourage p.o. hydration for symptomatic dizziness.  Case discussed with cardiologist Dr. Medina  agreed with the plan    Instructions provided to follow up with primary care physician as outpatient. Patient verbalized understanding and is aware to contact primary care physician or return to ED if new or worsening symptoms.    Physical exam on the day of discharge:  General: Patient resting comfortably in no acute distress.  Lungs: CTA. Good air entry.  Cor: Regular rate and rhythm. No murmurs. No pedal edema.  Abd: Soft. Nontender. Non-distended.  Neuro: A&O x3. Moving all 4 extremities equally  Ext: No clubbing. No cyanosis. Instructions provided to follow up with primary care physician as outpatient. Patient verbalized understanding and is aware to contact primary care physician or return to ED if new or worsening symptoms.

## 2020-11-12 NOTE — CARE UPDATE
11/11/20 2054   Patient Assessment/Suction   Level of Consciousness (AVPU) alert   Respiratory Effort Normal;Unlabored   Expansion/Accessory Muscles/Retractions no use of accessory muscles;no retractions;expansion symmetric   All Lung Fields Breath Sounds equal bilaterally;clear   Rhythm/Pattern, Respiratory unlabored;pattern regular;depth regular   Cough Frequency no cough   PRE-TX-O2   O2 Device (Oxygen Therapy) room air   SpO2 98 %   Pulse Oximetry Type Continuous   $ Pulse Oximetry - Multiple Charge Pulse Oximetry - Multiple   Pulse 73   Resp 19   Respiratory Evaluation   $ Care Plan Tech Time 15 min

## 2020-11-12 NOTE — CONSULTS
[facility]  Cardiology  Consult Note    Patient Name: Irvin Diaz  MRN: [id number]  Admission Date: 11/11/2020  Hospital Length of Stay: 0 days  Code Status: Full Code   Attending Provider: Yajaira Sam MD   Consulting Provider: Stephen Olmstead MD  Primary Care Physician: [facility]  Principal Problem:Syncope    Patient information was obtained from patient and ER records.     Consults  Subjective:     Chief Complaint:  Passing out spell     HPI:  45-year-old male patient reports 1 episode falling to the floor while he walked into his kitchen he spontaneously regained consciousness and said had minor injury to the right forearm.  He has never had seizure activity.  He denies any palpitations preceding the event.  After several minutes later while standing up he felt he was going to fall apparently his son helped him and prevented him from falling down.  Again there is no chest pain or any palpitations.  Patient reports he had similar episode of passing out approximately a year ago when he developed renal failure.  He is diagnosed to have advanced cirrhosis of the liver and has been on diuretics.  He was here in the hospital on 11/07/2020 and underwent evaluation for chest pain including a nuclear stress test that was negative.  Patient denies drinking any alcohol or taking illicit drugs.  He is presently asymptomatic.      Past Medical History:   Diagnosis Date    Alcoholic cirrhosis of liver     Arthritis     ATN (acute tubular necrosis)     CHF (congestive heart failure)     Diverticulitis 01/2020    GERD (gastroesophageal reflux disease)     GI bleed     Hip arthritis     Left    Hypertension     Macrocytic anemia     Pulmonary embolism 08/2018    Unprovoked DVT.  Stop Coumadin due to GIB    Thrombocytopenia        Past Surgical History:   Procedure Laterality Date    ANKLE SURGERY      COLON SURGERY  2007    [facility]  09/05/2019    [facility]     ESOPHAGOGASTRODUODENOSCOPY N/A 7/26/2019    Procedure: EGD (ESOPHAGOGASTRODUODENOSCOPY);  Surgeon: Brian Trivedi MD;  Location: [facility];  Service: Endoscopy;  Laterality: N/A;    ESOPHAGOGASTRODUODENOSCOPY N/A 4/8/2020    Procedure: EGD (ESOPHAGOGASTRODUODENOSCOPY);  Surgeon: Donn Acuna MD;  Location: [facility];  Service: Endoscopy;  Laterality: N/A;    HERNIA REPAIR Left     Inguinal       Review of patient's allergies indicates:   Allergen Reactions    Ciprofloxacin hcl Hallucinations    Morphine Itching       No current facility-administered medications on file prior to encounter.      Current Outpatient Medications on File Prior to Encounter   Medication Sig    carvediloL (COREG) 6.25 MG tablet Take 6.25 mg by mouth 2 (two) times daily with meals.     furosemide (LASIX) 20 MG tablet Take 1 tablet (20 mg total) by mouth once daily. Take 20 mg once daily.  Can take additional dose for increasing shortness of breath as needed.    gabapentin (NEURONTIN) 300 MG capsule Take 1 capsule (300 mg total) by mouth 3 (three) times daily.    lisinopriL (PRINIVIL,ZESTRIL) 5 MG tablet Take 1 tablet (5 mg total) by mouth once daily.    NIFEdipine (PROCARDIA-XL) 60 MG (OSM) 24 hr tablet Take 60 mg by mouth once daily.    ondansetron (ZOFRAN ODT) 4 MG TbDL Take 1 tablet (4 mg total) by mouth every 8 (eight) hours as needed (Nausea and Vomiting).    pantoprazole (PROTONIX) 20 MG tablet Take 2 tablets (40 mg total) by mouth once daily.    potassium chloride SA (K-DUR,KLOR-CON) 20 MEQ tablet Take 1 tablet (20 mEq total) by mouth 2 (two) times daily.    propranoloL (INDERAL) 10 MG tablet Take 10 mg by mouth 2 (two) times daily.    traMADoL (ULTRAM) 50 mg tablet Take 50 mg by mouth every 8 (eight) hours as needed for Pain.    amLODIPine (NORVASC) 5 MG tablet Take 2 tablets (10 mg total) by mouth once daily.    polyethylene glycol (GLYCOLAX) 17 gram PwPk Take 17 g by mouth 2 (two) times daily.     sucralfate (CARAFATE) 1 gram tablet Take 1 g by mouth 4 (four) times daily.     Family History     Problem Relation (Age of Onset)    Bladder Cancer Father    Breast cancer Mother    Hypertension Mother, Father    Prostate cancer Father        Tobacco Use    Smoking status: Former Smoker     Quit date:      Years since quittin.8    Smokeless tobacco: Never Used    Tobacco comment: quit 20 years ago   Substance and Sexual Activity    Alcohol use: Not Currently     Comment: freq    Drug use: Not Currently     Types: Marijuana    Sexual activity: Yes     Comment: occ     ROS  Objective:     Vital Signs (Most Recent):  Temp: 97.9 °F (36.6 °C) (20)  Pulse: 76 (20)  Resp: 18 (20)  BP: 123/70 (20)  SpO2: 98 % (20) Vital Signs (24h Range):  Temp:  [37.4 °F (3 °C)-99.2 °F (37.3 °C)] 97.9 °F (36.6 °C)  Pulse:  [71-91] 76  Resp:  [18-19] 18  SpO2:  [97 %-100 %] 98 %  BP: (116-145)/(57-80) 123/70     Weight: 83.9 kg (184 lb 15.5 oz)  Body mass index is 25.09 kg/m².    SpO2: 98 %  O2 Device (Oxygen Therapy): room air      Intake/Output Summary (Last 24 hours) at 2020 1133  Last data filed at 2020 1146  Gross per 24 hour   Intake --   Output 400 ml   Net -400 ml       Lines/Drains/Airways     Peripheral Intravenous Line                 Peripheral IV - Single Lumen 20 0844 20 G Left Forearm 1 day                Physical Exam he appears jaundiced he is alert fully oriented.  Cardiac exam pulses regular normal volume heart sounds are normal there is no cardiac murmur abdomen is soft and nontender lower extremities no edema.  Lungs are clear to auscultation  CNS is alert oriented x3    Significant Labs:   CMP   Recent Labs   Lab 20  0844 20  0329    135*   K 3.7 3.5    98   CO2 24 26   * 101   BUN 29* 18   CREATININE 2.4* 1.2   CALCIUM 8.7 9.1   PROT 8.1  --    ALBUMIN 3.5  --    BILITOT 3.3*  --    ALKPHOS 94  --     AST 93*  --    ALT 44  --    ANIONGAP 13 11   ESTGFRAFRICA 36.0* >60.0   EGFRNONAA 31.2* >60.0   , CBC   Recent Labs   Lab 11/11/20  0844 11/12/20  0329   WBC 8.43 7.38   HGB 10.3* 10.5*   HCT 29.7* 31.1*   PLT 77* 76*    and Troponin   Recent Labs   Lab 11/11/20  0844 11/12/20  0329   TROPONINI <0.030 <0.030       Significant Imaging: EKG: Admission ECG shows sinus rhythm rate 85  no significant acute changes  1.  Syncope.  2.  Cirrhosis of the liver.  3.  ENA appears to be resolving.      Based on his lab work and history I suspect he may have had a hypotensive episode secondary to dehydration.  He has just had a complete cardiac workup other than watching him on telemetry I do not recommend any further cardiac testing.    Recommend holding diuretics.  Orthostatics have been ordered but not have been recorded       Active Diagnoses:    Diagnosis Date Noted POA    PRINCIPAL PROBLEM:  Syncope [R55] 11/11/2020 Unknown    Arm pain [M79.603] 10/20/2020 Yes    Macrocytic anemia [D53.9]  Yes    ENA (acute kidney injury) [N17.9] 01/09/2020 Yes    Thrombocytopenia [D69.6] 05/11/2019 Yes      Problems Resolved During this Admission:       VTE Risk Mitigation (From admission, onward)         Ordered     IP VTE LOW RISK PATIENT  Once      11/11/20 0755                Thank you for your consult. I will follow-up with patient. Please contact us if you have any additional questions.    Stephen Olmstead MD  Cardiology   [facility]

## 2020-11-12 NOTE — PLAN OF CARE
11/12/20 0819   PRE-TX-O2   O2 Device (Oxygen Therapy) room air   SpO2 98 %   Pulse Oximetry Type Continuous   $ Pulse Oximetry - Multiple Charge Pulse Oximetry - Multiple   Pulse 76   Resp 18   Respiratory Evaluation   $ Care Plan Tech Time 15 min

## 2020-11-12 NOTE — PLAN OF CARE
11/12/20 1521   Final Note   Assessment Type Final Discharge Note   Anticipated Discharge Disposition Home   Post-Acute Status   Post-Acute Authorization Other   Other Status No Post-Acute Service Needs   Discharge Delays None known at this time

## 2020-11-12 NOTE — DISCHARGE SUMMARY
[facility]  Discharge Summary      Patient Name: Irvin Diza  MRN: [id number]  Admission Date: 11/11/2020  Hospital Length of Stay: 0 days  Discharge Date and Time:  11/12/2020 3:27 PM  Attending Physician: No att. providers found   Discharging Provider: Yajaira Sam MD  Primary Care Provider: [facility]      HPI:   No notes on file    * No surgery found *      Hospital Course:   Patient was admitted for the evaluation of syncope, admitted to telemetry, no arrhythmias reported, no orthostatic blood pressure drop, troponin x2 negative  Patient reports mild dizziness, blood pressure stable, cardiology consult appreciated, patient had recent stress test which was normal  Medications reconciled, advised to use Lasix as needed only and hold for now, discontinue taking amlodipine and Coreg  His blood pressure well controlled with nifedipine and propranolol alone  ENA resolved, encourage p.o. hydration for symptomatic dizziness.  Case discussed with cardiologist Dr. Medina  agreed with the plan    Instructions provided to follow up with primary care physician as outpatient. Patient verbalized understanding and is aware to contact primary care physician or return to ED if new or worsening symptoms.    Physical exam on the day of discharge:  General: Patient resting comfortably in no acute distress.  Lungs: CTA. Good air entry.  Cor: Regular rate and rhythm. No murmurs. No pedal edema.  Abd: Soft. Nontender. Non-distended.  Neuro: A&O x3. Moving all 4 extremities equally  Ext: No clubbing. No cyanosis. Instructions provided to follow up with primary care physician as outpatient. Patient verbalized understanding and is aware to contact primary care physician or return to ED if new or worsening symptoms.            Consults:   Consults (From admission, onward)        Status Ordering Provider     Inpatient consult to Cardio Pulmonary Rehab  Once      Provider:  (Not yet assigned)    Acknowledged YAJAIRA MCCULLOUGH     Inpatient consult to Cardiology  Once     Provider:  Stephen Olmstead MD    Completed YAJAIRA MCCULLOUGH     Inpatient consult to Hospitalist  Once     Provider:  Yajaira Mccullough MD    Acknowledged YAJAIRA MCCULLOUGH          No new Assessment & Plan notes have been filed under this hospital service since the last note was generated.  Service: Hospital Medicine    Final Active Diagnoses:    Diagnosis Date Noted POA    PRINCIPAL PROBLEM:  Syncope [R55] 11/11/2020 Unknown    Arm pain [M79.603] 10/20/2020 Yes    Macrocytic anemia [D53.9]  Yes    Thrombocytopenia [D69.6] 05/11/2019 Yes      Problems Resolved During this Admission:    Diagnosis Date Noted Date Resolved POA    ENA (acute kidney injury) [N17.9] 01/09/2020 11/12/2020 Yes       Discharged Condition: stable    Disposition: Home or Self Care    Follow Up:  Follow-up Information     [facility] In 1 week.    Specialties: Internal Medicine, Family Medicine  Contact information:  [address] [place], [place] [id number] [phone]                 Patient Instructions:      Diet Cardiac     Activity as tolerated       Significant Diagnostic Studies: Labs:   BMP:   Recent Labs   Lab 11/11/20  0844 11/12/20  0329   * 101    135*   K 3.7 3.5    98   CO2 24 26   BUN 29* 18   CREATININE 2.4* 1.2   CALCIUM 8.7 9.1   MG 1.9 2.1    and CBC   Recent Labs   Lab 11/11/20  0844 11/12/20  0329   WBC 8.43 7.38   HGB 10.3* 10.5*   HCT 29.7* 31.1*   PLT 77* 76*       Pending Diagnostic Studies:     None         Medications:  Reconciled Home Medications:      Medication List      CHANGE how you take these medications    furosemide 20 MG tablet  Commonly known as: LASIX  Take 1 tablet (20 mg total) by mouth as needed. Take 20 mg once daily.  Can take additional dose for increasing shortness of breath as needed.  What changed:   · when to take  this  · reasons to take this        CONTINUE taking these medications    gabapentin 300 MG capsule  Commonly known as: NEURONTIN  Take 1 capsule (300 mg total) by mouth 3 (three) times daily.     lisinopriL 5 MG tablet  Commonly known as: PRINIVIL,ZESTRIL  Take 1 tablet (5 mg total) by mouth once daily.     NIFEdipine 60 MG (OSM) 24 hr tablet  Commonly known as: PROCARDIA-XL  Take 60 mg by mouth once daily.     ondansetron 4 MG Tbdl  Commonly known as: ZOFRAN ODT  Take 1 tablet (4 mg total) by mouth every 8 (eight) hours as needed (Nausea and Vomiting).     pantoprazole 20 MG tablet  Commonly known as: PROTONIX  Take 2 tablets (40 mg total) by mouth once daily.     polyethylene glycol 17 gram Pwpk  Commonly known as: GLYCOLAX  Take 17 g by mouth 2 (two) times daily.     potassium chloride SA 20 MEQ tablet  Commonly known as: K-DUR,KLOR-CON  Take 1 tablet (20 mEq total) by mouth 2 (two) times daily.     propranoloL 10 MG tablet  Commonly known as: INDERAL  Take 10 mg by mouth 2 (two) times daily.     sucralfate 1 gram tablet  Commonly known as: CARAFATE  Take 1 g by mouth 4 (four) times daily.     traMADoL 50 mg tablet  Commonly known as: ULTRAM  Take 50 mg by mouth every 8 (eight) hours as needed for Pain.        STOP taking these medications    amLODIPine 5 MG tablet  Commonly known as: NORVASC     carvediloL 6.25 MG tablet  Commonly known as: COREG            Indwelling Lines/Drains at time of discharge:   Lines/Drains/Airways     None                 Time spent on the discharge of patient: 33  minutes  Patient was seen and examined on the date of discharge and determined to be suitable for discharge.         Yajaira Sam MD  Department of Hospital Medicine  [facility]

## 2020-11-19 PROBLEM — R06.02 SHORTNESS OF BREATH: Status: ACTIVE | Noted: 2020-11-19

## 2021-01-02 ENCOUNTER — HOSPITAL ENCOUNTER (INPATIENT)
Facility: HOSPITAL | Age: 46
LOS: 2 days | Discharge: HOME | DRG: 442 | End: 2021-01-04
Attending: FAMILY MEDICINE | Admitting: FAMILY MEDICINE
Payer: MEDICAID

## 2021-01-02 ENCOUNTER — ANESTHESIA EVENT (OUTPATIENT)
Dept: ENDOSCOPY | Facility: HOSPITAL | Age: 46
DRG: 442 | End: 2021-01-02
Payer: MEDICAID

## 2021-01-02 DIAGNOSIS — K70.30 ALCOHOLIC CIRRHOSIS OF LIVER WITHOUT ASCITES: Chronic | ICD-10-CM

## 2021-01-02 DIAGNOSIS — I10 ESSENTIAL HYPERTENSION: Chronic | ICD-10-CM

## 2021-01-02 DIAGNOSIS — N18.30 STAGE 3 CHRONIC KIDNEY DISEASE, UNSPECIFIED WHETHER STAGE 3A OR 3B CKD: Chronic | ICD-10-CM

## 2021-01-02 DIAGNOSIS — K92.2 GIB (GASTROINTESTINAL BLEEDING): Primary | ICD-10-CM

## 2021-01-02 DIAGNOSIS — K29.71 GASTROINTESTINAL HEMORRHAGE ASSOCIATED WITH GASTRITIS, UNSPECIFIED GASTRITIS TYPE: ICD-10-CM

## 2021-01-02 DIAGNOSIS — R74.01 TRANSAMINITIS: ICD-10-CM

## 2021-01-02 DIAGNOSIS — N18.31 STAGE 3A CHRONIC KIDNEY DISEASE: Chronic | ICD-10-CM

## 2021-01-02 PROCEDURE — C9113 INJ PANTOPRAZOLE SODIUM, VIA: HCPCS | Performed by: FAMILY MEDICINE

## 2021-01-02 PROCEDURE — 63600175 PHARM REV CODE 636 W HCPCS: Performed by: FAMILY MEDICINE

## 2021-01-02 PROCEDURE — 94761 N-INVAS EAR/PLS OXIMETRY MLT: CPT

## 2021-01-02 PROCEDURE — 25000003 PHARM REV CODE 250: Performed by: STUDENT IN AN ORGANIZED HEALTH CARE EDUCATION/TRAINING PROGRAM

## 2021-01-02 PROCEDURE — 25000003 PHARM REV CODE 250: Performed by: FAMILY MEDICINE

## 2021-01-02 PROCEDURE — 63600175 PHARM REV CODE 636 W HCPCS: Performed by: NURSE PRACTITIONER

## 2021-01-02 PROCEDURE — 63600175 PHARM REV CODE 636 W HCPCS: Performed by: STUDENT IN AN ORGANIZED HEALTH CARE EDUCATION/TRAINING PROGRAM

## 2021-01-02 PROCEDURE — 11000001 HC ACUTE MED/SURG PRIVATE ROOM

## 2021-01-02 RX ORDER — PROPRANOLOL HYDROCHLORIDE 10 MG/1
10 TABLET ORAL 2 TIMES DAILY
Status: DISCONTINUED | OUTPATIENT
Start: 2021-01-02 | End: 2021-01-04 | Stop reason: HOSPADM

## 2021-01-02 RX ORDER — TRAMADOL HYDROCHLORIDE 50 MG/1
50 TABLET ORAL EVERY 6 HOURS PRN
Status: DISCONTINUED | OUTPATIENT
Start: 2021-01-02 | End: 2021-01-04 | Stop reason: HOSPADM

## 2021-01-02 RX ORDER — NIFEDIPINE 30 MG/1
60 TABLET, EXTENDED RELEASE ORAL DAILY
Status: DISCONTINUED | OUTPATIENT
Start: 2021-01-02 | End: 2021-01-04 | Stop reason: HOSPADM

## 2021-01-02 RX ORDER — ONDANSETRON 4 MG/1
4 TABLET, ORALLY DISINTEGRATING ORAL EVERY 8 HOURS PRN
Status: DISCONTINUED | OUTPATIENT
Start: 2021-01-02 | End: 2021-01-04 | Stop reason: HOSPADM

## 2021-01-02 RX ORDER — SODIUM CHLORIDE 0.9 % (FLUSH) 0.9 %
10 SYRINGE (ML) INJECTION
Status: DISCONTINUED | OUTPATIENT
Start: 2021-01-02 | End: 2021-01-04 | Stop reason: HOSPADM

## 2021-01-02 RX ORDER — GABAPENTIN 300 MG/1
300 CAPSULE ORAL 3 TIMES DAILY
Status: DISCONTINUED | OUTPATIENT
Start: 2021-01-02 | End: 2021-01-04 | Stop reason: HOSPADM

## 2021-01-02 RX ORDER — FERROUS SULFATE 325(65) MG
325 TABLET ORAL
COMMUNITY
End: 2021-02-15

## 2021-01-02 RX ORDER — HYDROMORPHONE HYDROCHLORIDE 1 MG/ML
1 INJECTION, SOLUTION INTRAMUSCULAR; INTRAVENOUS; SUBCUTANEOUS EVERY 8 HOURS PRN
Status: DISCONTINUED | OUTPATIENT
Start: 2021-01-02 | End: 2021-01-04 | Stop reason: HOSPADM

## 2021-01-02 RX ADMIN — NIFEDIPINE 60 MG: 30 TABLET, FILM COATED, EXTENDED RELEASE ORAL at 12:01

## 2021-01-02 RX ADMIN — PROPRANOLOL HYDROCHLORIDE 10 MG: 10 TABLET ORAL at 01:01

## 2021-01-02 RX ADMIN — OCTREOTIDE ACETATE 50 MCG/HR: 500 INJECTION, SOLUTION INTRAVENOUS; SUBCUTANEOUS at 05:01

## 2021-01-02 RX ADMIN — PANTOPRAZOLE SODIUM 8 MG/HR: 40 INJECTION, POWDER, FOR SOLUTION INTRAVENOUS at 10:01

## 2021-01-02 RX ADMIN — TRAMADOL HYDROCHLORIDE 50 MG: 50 TABLET, COATED ORAL at 05:01

## 2021-01-02 RX ADMIN — PHYTONADIONE 10 MG: 10 INJECTION, EMULSION INTRAMUSCULAR; INTRAVENOUS; SUBCUTANEOUS at 10:01

## 2021-01-02 RX ADMIN — TRAMADOL HYDROCHLORIDE 50 MG: 50 TABLET, COATED ORAL at 12:01

## 2021-01-02 RX ADMIN — GABAPENTIN 300 MG: 300 CAPSULE ORAL at 02:01

## 2021-01-02 RX ADMIN — PROPRANOLOL HYDROCHLORIDE 10 MG: 10 TABLET ORAL at 09:01

## 2021-01-02 RX ADMIN — GABAPENTIN 300 MG: 300 CAPSULE ORAL at 09:01

## 2021-01-02 RX ADMIN — HYDROMORPHONE HYDROCHLORIDE 1 MG: 1 INJECTION, SOLUTION INTRAMUSCULAR; INTRAVENOUS; SUBCUTANEOUS at 10:01

## 2021-01-02 RX ADMIN — PANTOPRAZOLE SODIUM 8 MG/HR: 40 INJECTION, POWDER, FOR SOLUTION INTRAVENOUS at 01:01

## 2021-01-03 ENCOUNTER — ANESTHESIA (OUTPATIENT)
Dept: ENDOSCOPY | Facility: HOSPITAL | Age: 46
DRG: 442 | End: 2021-01-03
Payer: MEDICAID

## 2021-01-03 LAB
ANION GAP SERPL CALC-SCNC: 15 MMOL/L (ref 8–16)
BASOPHILS # BLD AUTO: 0.03 K/UL (ref 0–0.2)
BASOPHILS NFR BLD: 0.6 % (ref 0–1.9)
BUN SERPL-MCNC: 13 MG/DL (ref 6–20)
CALCIUM SERPL-MCNC: 8.9 MG/DL (ref 8.7–10.5)
CHLORIDE SERPL-SCNC: 98 MMOL/L (ref 95–110)
CO2 SERPL-SCNC: 20 MMOL/L (ref 23–29)
CREAT SERPL-MCNC: 1.2 MG/DL (ref 0.5–1.4)
DIFFERENTIAL METHOD: ABNORMAL
EOSINOPHIL # BLD AUTO: 0.1 K/UL (ref 0–0.5)
EOSINOPHIL NFR BLD: 1.9 % (ref 0–8)
ERYTHROCYTE [DISTWIDTH] IN BLOOD BY AUTOMATED COUNT: 16.6 % (ref 11.5–14.5)
EST. GFR  (AFRICAN AMERICAN): >60 ML/MIN/1.73 M^2
EST. GFR  (NON AFRICAN AMERICAN): >60 ML/MIN/1.73 M^2
GLUCOSE SERPL-MCNC: 123 MG/DL (ref 70–110)
HCT VFR BLD AUTO: 34.6 % (ref 40–54)
HGB BLD-MCNC: 11.3 G/DL (ref 14–18)
IMM GRANULOCYTES # BLD AUTO: 0 K/UL (ref 0–0.04)
IMM GRANULOCYTES NFR BLD AUTO: 0 % (ref 0–0.5)
LYMPHOCYTES # BLD AUTO: 1.4 K/UL (ref 1–4.8)
LYMPHOCYTES NFR BLD: 26.6 % (ref 18–48)
MCH RBC QN AUTO: 36.1 PG (ref 27–31)
MCHC RBC AUTO-ENTMCNC: 32.7 G/DL (ref 32–36)
MCV RBC AUTO: 111 FL (ref 82–98)
MONOCYTES # BLD AUTO: 0.6 K/UL (ref 0.3–1)
MONOCYTES NFR BLD: 11.2 % (ref 4–15)
NEUTROPHILS # BLD AUTO: 3.2 K/UL (ref 1.8–7.7)
NEUTROPHILS NFR BLD: 59.7 % (ref 38–73)
NRBC BLD-RTO: 0 /100 WBC
PLATELET # BLD AUTO: 70 K/UL (ref 150–350)
PMV BLD AUTO: 11.3 FL (ref 9.2–12.9)
POTASSIUM SERPL-SCNC: 4 MMOL/L (ref 3.5–5.1)
RBC # BLD AUTO: 3.13 M/UL (ref 4.6–6.2)
SODIUM SERPL-SCNC: 133 MMOL/L (ref 136–145)
WBC # BLD AUTO: 5.34 K/UL (ref 3.9–12.7)

## 2021-01-03 PROCEDURE — 80048 BASIC METABOLIC PNL TOTAL CA: CPT

## 2021-01-03 PROCEDURE — 85025 COMPLETE CBC W/AUTO DIFF WBC: CPT

## 2021-01-03 PROCEDURE — 63600175 PHARM REV CODE 636 W HCPCS: Performed by: STUDENT IN AN ORGANIZED HEALTH CARE EDUCATION/TRAINING PROGRAM

## 2021-01-03 PROCEDURE — 37000009 HC ANESTHESIA EA ADD 15 MINS: Performed by: INTERNAL MEDICINE

## 2021-01-03 PROCEDURE — 88342 CHG IMMUNOCYTOCHEMISTRY: ICD-10-PCS | Mod: 26,,, | Performed by: PATHOLOGY

## 2021-01-03 PROCEDURE — 88305 TISSUE EXAM BY PATHOLOGIST: CPT | Performed by: PATHOLOGY

## 2021-01-03 PROCEDURE — 43239 EGD BIOPSY SINGLE/MULTIPLE: CPT | Performed by: INTERNAL MEDICINE

## 2021-01-03 PROCEDURE — 88305 TISSUE EXAM BY PATHOLOGIST: CPT | Mod: 26,,, | Performed by: PATHOLOGY

## 2021-01-03 PROCEDURE — 25000003 PHARM REV CODE 250: Performed by: STUDENT IN AN ORGANIZED HEALTH CARE EDUCATION/TRAINING PROGRAM

## 2021-01-03 PROCEDURE — 88342 IMHCHEM/IMCYTCHM 1ST ANTB: CPT | Mod: 26,,, | Performed by: PATHOLOGY

## 2021-01-03 PROCEDURE — 63600175 PHARM REV CODE 636 W HCPCS: Performed by: FAMILY MEDICINE

## 2021-01-03 PROCEDURE — 88342 IMHCHEM/IMCYTCHM 1ST ANTB: CPT | Performed by: PATHOLOGY

## 2021-01-03 PROCEDURE — 36415 COLL VENOUS BLD VENIPUNCTURE: CPT

## 2021-01-03 PROCEDURE — 94761 N-INVAS EAR/PLS OXIMETRY MLT: CPT

## 2021-01-03 PROCEDURE — 25000003 PHARM REV CODE 250: Performed by: FAMILY MEDICINE

## 2021-01-03 PROCEDURE — 37000008 HC ANESTHESIA 1ST 15 MINUTES: Performed by: INTERNAL MEDICINE

## 2021-01-03 PROCEDURE — 27201012 HC FORCEPS, HOT/COLD, DISP: Performed by: INTERNAL MEDICINE

## 2021-01-03 PROCEDURE — 63600175 PHARM REV CODE 636 W HCPCS: Performed by: NURSE PRACTITIONER

## 2021-01-03 PROCEDURE — 11000001 HC ACUTE MED/SURG PRIVATE ROOM

## 2021-01-03 PROCEDURE — C9113 INJ PANTOPRAZOLE SODIUM, VIA: HCPCS | Performed by: FAMILY MEDICINE

## 2021-01-03 PROCEDURE — 88305 TISSUE EXAM BY PATHOLOGIST: ICD-10-PCS | Mod: 26,,, | Performed by: PATHOLOGY

## 2021-01-03 RX ORDER — PROPOFOL 10 MG/ML
VIAL (ML) INTRAVENOUS CONTINUOUS PRN
Status: DISCONTINUED | OUTPATIENT
Start: 2021-01-03 | End: 2021-01-03

## 2021-01-03 RX ORDER — MIDAZOLAM HYDROCHLORIDE 1 MG/ML
INJECTION INTRAMUSCULAR; INTRAVENOUS
Status: DISCONTINUED | OUTPATIENT
Start: 2021-01-03 | End: 2021-01-03

## 2021-01-03 RX ORDER — PANTOPRAZOLE SODIUM 40 MG/1
40 TABLET, DELAYED RELEASE ORAL
Status: DISCONTINUED | OUTPATIENT
Start: 2021-01-03 | End: 2021-01-04 | Stop reason: HOSPADM

## 2021-01-03 RX ORDER — LIDOCAINE HYDROCHLORIDE 20 MG/ML
INJECTION, SOLUTION EPIDURAL; INFILTRATION; INTRACAUDAL; PERINEURAL
Status: DISCONTINUED | OUTPATIENT
Start: 2021-01-03 | End: 2021-01-03

## 2021-01-03 RX ADMIN — TRAMADOL HYDROCHLORIDE 50 MG: 50 TABLET, COATED ORAL at 05:01

## 2021-01-03 RX ADMIN — PANTOPRAZOLE SODIUM 40 MG: 40 TABLET, DELAYED RELEASE ORAL at 03:01

## 2021-01-03 RX ADMIN — GABAPENTIN 300 MG: 300 CAPSULE ORAL at 08:01

## 2021-01-03 RX ADMIN — OCTREOTIDE ACETATE 50 MCG/HR: 500 INJECTION, SOLUTION INTRAVENOUS; SUBCUTANEOUS at 03:01

## 2021-01-03 RX ADMIN — PROPOFOL 50 MCG/KG/MIN: 10 INJECTION, EMULSION INTRAVENOUS at 09:01

## 2021-01-03 RX ADMIN — PANTOPRAZOLE SODIUM 8 MG/HR: 40 INJECTION, POWDER, FOR SOLUTION INTRAVENOUS at 03:01

## 2021-01-03 RX ADMIN — HYDROMORPHONE HYDROCHLORIDE 1 MG: 1 INJECTION, SOLUTION INTRAMUSCULAR; INTRAVENOUS; SUBCUTANEOUS at 06:01

## 2021-01-03 RX ADMIN — PROPRANOLOL HYDROCHLORIDE 10 MG: 10 TABLET ORAL at 10:01

## 2021-01-03 RX ADMIN — PROPRANOLOL HYDROCHLORIDE 10 MG: 10 TABLET ORAL at 08:01

## 2021-01-03 RX ADMIN — GABAPENTIN 300 MG: 300 CAPSULE ORAL at 10:01

## 2021-01-03 RX ADMIN — NIFEDIPINE 60 MG: 30 TABLET, FILM COATED, EXTENDED RELEASE ORAL at 10:01

## 2021-01-03 RX ADMIN — TRAMADOL HYDROCHLORIDE 50 MG: 50 TABLET, COATED ORAL at 02:01

## 2021-01-03 RX ADMIN — MIDAZOLAM HYDROCHLORIDE 2 MG: 1 INJECTION, SOLUTION INTRAMUSCULAR; INTRAVENOUS at 09:01

## 2021-01-03 RX ADMIN — LIDOCAINE HYDROCHLORIDE 60 MG: 20 INJECTION, SOLUTION EPIDURAL; INFILTRATION; INTRACAUDAL; PERINEURAL at 09:01

## 2021-01-03 RX ADMIN — GABAPENTIN 300 MG: 300 CAPSULE ORAL at 02:01

## 2021-01-04 VITALS
SYSTOLIC BLOOD PRESSURE: 108 MMHG | HEIGHT: 72 IN | DIASTOLIC BLOOD PRESSURE: 64 MMHG | HEART RATE: 74 BPM | OXYGEN SATURATION: 98 % | BODY MASS INDEX: 25.47 KG/M2 | WEIGHT: 188.06 LBS | TEMPERATURE: 98 F | RESPIRATION RATE: 15 BRPM

## 2021-01-04 PROBLEM — K92.2 GIB (GASTROINTESTINAL BLEEDING): Status: RESOLVED | Noted: 2021-01-02 | Resolved: 2021-01-04

## 2021-01-04 PROCEDURE — 63600175 PHARM REV CODE 636 W HCPCS: Performed by: NURSE PRACTITIONER

## 2021-01-04 PROCEDURE — 25000003 PHARM REV CODE 250: Performed by: FAMILY MEDICINE

## 2021-01-04 PROCEDURE — 94761 N-INVAS EAR/PLS OXIMETRY MLT: CPT

## 2021-01-04 RX ADMIN — HYDROMORPHONE HYDROCHLORIDE 1 MG: 1 INJECTION, SOLUTION INTRAMUSCULAR; INTRAVENOUS; SUBCUTANEOUS at 04:01

## 2021-01-04 RX ADMIN — NIFEDIPINE 60 MG: 30 TABLET, FILM COATED, EXTENDED RELEASE ORAL at 09:01

## 2021-01-04 RX ADMIN — PROPRANOLOL HYDROCHLORIDE 10 MG: 10 TABLET ORAL at 09:01

## 2021-01-04 RX ADMIN — PANTOPRAZOLE SODIUM 40 MG: 40 TABLET, DELAYED RELEASE ORAL at 06:01

## 2021-01-04 RX ADMIN — TRAMADOL HYDROCHLORIDE 50 MG: 50 TABLET, COATED ORAL at 06:01

## 2021-01-04 RX ADMIN — HYDROMORPHONE HYDROCHLORIDE 1 MG: 1 INJECTION, SOLUTION INTRAMUSCULAR; INTRAVENOUS; SUBCUTANEOUS at 12:01

## 2021-01-04 RX ADMIN — TRAMADOL HYDROCHLORIDE 50 MG: 50 TABLET, COATED ORAL at 12:01

## 2021-01-04 RX ADMIN — GABAPENTIN 300 MG: 300 CAPSULE ORAL at 09:01

## 2021-01-05 ENCOUNTER — TELEPHONE (OUTPATIENT)
Dept: FAMILY MEDICINE | Facility: CLINIC | Age: 46
End: 2021-01-05

## 2021-01-07 LAB
FINAL PATHOLOGIC DIAGNOSIS: NORMAL
GROSS: NORMAL
Lab: NORMAL

## 2021-01-23 ENCOUNTER — HOSPITAL ENCOUNTER (EMERGENCY)
Facility: HOSPITAL | Age: 46
Discharge: HOME | End: 2021-01-23
Attending: EMERGENCY MEDICINE
Payer: MEDICAID

## 2021-01-23 VITALS
OXYGEN SATURATION: 96 % | BODY MASS INDEX: 25.06 KG/M2 | SYSTOLIC BLOOD PRESSURE: 156 MMHG | RESPIRATION RATE: 18 BRPM | TEMPERATURE: 98 F | DIASTOLIC BLOOD PRESSURE: 81 MMHG | HEART RATE: 110 BPM | HEIGHT: 72 IN | WEIGHT: 185 LBS

## 2021-01-23 DIAGNOSIS — R53.83 FATIGUE, UNSPECIFIED TYPE: ICD-10-CM

## 2021-01-23 DIAGNOSIS — R00.2 PALPITATIONS: Primary | ICD-10-CM

## 2021-01-23 DIAGNOSIS — R06.02 SHORTNESS OF BREATH: ICD-10-CM

## 2021-01-23 LAB
ALBUMIN SERPL BCP-MCNC: 3.7 G/DL (ref 3.5–5.2)
ALP SERPL-CCNC: 118 U/L (ref 55–135)
ALT SERPL W/O P-5'-P-CCNC: 64 U/L (ref 10–44)
ANION GAP SERPL CALC-SCNC: 32 MMOL/L (ref 8–16)
AST SERPL-CCNC: 161 U/L (ref 10–40)
BASOPHILS # BLD AUTO: 0.05 K/UL (ref 0–0.2)
BASOPHILS NFR BLD: 0.6 % (ref 0–1.9)
BILIRUB SERPL-MCNC: 7 MG/DL (ref 0.1–1)
BNP SERPL-MCNC: 73 PG/ML (ref 0–99)
BUN SERPL-MCNC: 13 MG/DL (ref 6–20)
CALCIUM SERPL-MCNC: 9.2 MG/DL (ref 8.7–10.5)
CHLORIDE SERPL-SCNC: 98 MMOL/L (ref 95–110)
CO2 SERPL-SCNC: 11 MMOL/L (ref 23–29)
CREAT SERPL-MCNC: 1.3 MG/DL (ref 0.5–1.4)
DIFFERENTIAL METHOD: ABNORMAL
EOSINOPHIL # BLD AUTO: 0 K/UL (ref 0–0.5)
EOSINOPHIL NFR BLD: 0.2 % (ref 0–8)
ERYTHROCYTE [DISTWIDTH] IN BLOOD BY AUTOMATED COUNT: 18 % (ref 11.5–14.5)
EST. GFR  (AFRICAN AMERICAN): >60 ML/MIN/1.73 M^2
EST. GFR  (NON AFRICAN AMERICAN): >60 ML/MIN/1.73 M^2
GLUCOSE SERPL-MCNC: 89 MG/DL (ref 70–110)
HCT VFR BLD AUTO: 31.6 % (ref 40–54)
HGB BLD-MCNC: 10.1 G/DL (ref 14–18)
IMM GRANULOCYTES # BLD AUTO: 0.09 K/UL (ref 0–0.04)
IMM GRANULOCYTES NFR BLD AUTO: 1 % (ref 0–0.5)
INR PPP: 1.5
LYMPHOCYTES # BLD AUTO: 0.9 K/UL (ref 1–4.8)
LYMPHOCYTES NFR BLD: 10.4 % (ref 18–48)
MCH RBC QN AUTO: 36.5 PG (ref 27–31)
MCHC RBC AUTO-ENTMCNC: 32 G/DL (ref 32–36)
MCV RBC AUTO: 114 FL (ref 82–98)
MONOCYTES # BLD AUTO: 0.5 K/UL (ref 0.3–1)
MONOCYTES NFR BLD: 5.7 % (ref 4–15)
NEUTROPHILS # BLD AUTO: 7.3 K/UL (ref 1.8–7.7)
NEUTROPHILS NFR BLD: 82.1 % (ref 38–73)
NRBC BLD-RTO: 0 /100 WBC
PLATELET # BLD AUTO: 87 K/UL (ref 150–350)
PMV BLD AUTO: 11.3 FL (ref 9.2–12.9)
POTASSIUM SERPL-SCNC: 3.4 MMOL/L (ref 3.5–5.1)
PROT SERPL-MCNC: 9 G/DL (ref 6–8.4)
PROTHROMBIN TIME: 17.3 SEC (ref 10.6–14.8)
RBC # BLD AUTO: 2.77 M/UL (ref 4.6–6.2)
SODIUM SERPL-SCNC: 141 MMOL/L (ref 136–145)
TROPONIN I SERPL DL<=0.01 NG/ML-MCNC: 0.04 NG/ML
WBC # BLD AUTO: 8.84 K/UL (ref 3.9–12.7)

## 2021-01-23 PROCEDURE — 84484 ASSAY OF TROPONIN QUANT: CPT

## 2021-01-23 PROCEDURE — 99285 EMERGENCY DEPT VISIT HI MDM: CPT | Mod: 25

## 2021-01-23 PROCEDURE — 93010 EKG 12-LEAD: ICD-10-PCS | Mod: ,,, | Performed by: INTERNAL MEDICINE

## 2021-01-23 PROCEDURE — 63600175 PHARM REV CODE 636 W HCPCS: Performed by: EMERGENCY MEDICINE

## 2021-01-23 PROCEDURE — 85025 COMPLETE CBC W/AUTO DIFF WBC: CPT

## 2021-01-23 PROCEDURE — 93005 ELECTROCARDIOGRAM TRACING: CPT | Performed by: INTERNAL MEDICINE

## 2021-01-23 PROCEDURE — 96374 THER/PROPH/DIAG INJ IV PUSH: CPT

## 2021-01-23 PROCEDURE — 85610 PROTHROMBIN TIME: CPT

## 2021-01-23 PROCEDURE — 96361 HYDRATE IV INFUSION ADD-ON: CPT

## 2021-01-23 PROCEDURE — 96375 TX/PRO/DX INJ NEW DRUG ADDON: CPT

## 2021-01-23 PROCEDURE — 83880 ASSAY OF NATRIURETIC PEPTIDE: CPT

## 2021-01-23 PROCEDURE — 80053 COMPREHEN METABOLIC PANEL: CPT

## 2021-01-23 PROCEDURE — 93010 ELECTROCARDIOGRAM REPORT: CPT | Mod: ,,, | Performed by: INTERNAL MEDICINE

## 2021-01-23 RX ORDER — ONDANSETRON 2 MG/ML
4 INJECTION INTRAMUSCULAR; INTRAVENOUS
Status: COMPLETED | OUTPATIENT
Start: 2021-01-23 | End: 2021-01-23

## 2021-01-23 RX ORDER — KETOROLAC TROMETHAMINE 30 MG/ML
15 INJECTION, SOLUTION INTRAMUSCULAR; INTRAVENOUS
Status: COMPLETED | OUTPATIENT
Start: 2021-01-23 | End: 2021-01-23

## 2021-01-23 RX ADMIN — ONDANSETRON 4 MG: 2 INJECTION INTRAMUSCULAR; INTRAVENOUS at 11:01

## 2021-01-23 RX ADMIN — SODIUM CHLORIDE, SODIUM LACTATE, POTASSIUM CHLORIDE, AND CALCIUM CHLORIDE 1000 ML: .6; .31; .03; .02 INJECTION, SOLUTION INTRAVENOUS at 10:01

## 2021-01-23 RX ADMIN — KETOROLAC TROMETHAMINE 15 MG: 30 INJECTION, SOLUTION INTRAMUSCULAR at 11:01

## 2021-02-15 ENCOUNTER — HOSPITAL ENCOUNTER (INPATIENT)
Facility: HOSPITAL | Age: 46
LOS: 2 days | Discharge: HOME | DRG: 392 | End: 2021-02-17
Attending: EMERGENCY MEDICINE | Admitting: FAMILY MEDICINE
Payer: MEDICAID

## 2021-02-15 DIAGNOSIS — E16.2 HYPOGLYCEMIA: Primary | ICD-10-CM

## 2021-02-15 DIAGNOSIS — D64.9 ANEMIA, UNSPECIFIED TYPE: ICD-10-CM

## 2021-02-15 DIAGNOSIS — R11.2 NAUSEA & VOMITING: ICD-10-CM

## 2021-02-15 DIAGNOSIS — R55 SYNCOPE: ICD-10-CM

## 2021-02-15 DIAGNOSIS — K70.9 ALCOHOLIC LIVER DISEASE: ICD-10-CM

## 2021-02-15 DIAGNOSIS — A09 INFECTIOUS COLITIS: ICD-10-CM

## 2021-02-15 DIAGNOSIS — D69.6 THROMBOCYTOPENIA: ICD-10-CM

## 2021-02-15 DIAGNOSIS — R07.9 CHEST PAIN: ICD-10-CM

## 2021-02-15 PROBLEM — N18.30 ACUTE RENAL FAILURE SUPERIMPOSED ON STAGE 3 CHRONIC KIDNEY DISEASE: Status: ACTIVE | Noted: 2019-05-11

## 2021-02-15 PROBLEM — E87.29 HIGH ANION GAP METABOLIC ACIDOSIS: Status: ACTIVE | Noted: 2021-02-15

## 2021-02-15 PROBLEM — Z86.711 HISTORY OF PULMONARY EMBOLUS (PE): Chronic | Status: ACTIVE | Noted: 2019-05-11

## 2021-02-15 PROBLEM — Z87.19 HISTORY OF GI BLEED: Chronic | Status: ACTIVE | Noted: 2021-02-15

## 2021-02-15 LAB
ALBUMIN SERPL BCP-MCNC: 3.7 G/DL (ref 3.5–5.2)
ALLENS TEST: ABNORMAL
ALP SERPL-CCNC: 110 U/L (ref 55–135)
ALT SERPL W/O P-5'-P-CCNC: 71 U/L (ref 10–44)
ANION GAP SERPL CALC-SCNC: 30 MMOL/L (ref 8–16)
APAP SERPL-MCNC: <10 UG/ML (ref 10–20)
AST SERPL-CCNC: 212 U/L (ref 10–40)
B-OH-BUTYR BLD STRIP-SCNC: 2.5 MMOL/L (ref 0–0.5)
BASOPHILS # BLD AUTO: 0.03 K/UL (ref 0–0.2)
BASOPHILS NFR BLD: 0.3 % (ref 0–1.9)
BILIRUB SERPL-MCNC: 9.8 MG/DL (ref 0.1–1)
BILIRUB UR QL STRIP: ABNORMAL
BUN SERPL-MCNC: 25 MG/DL (ref 6–20)
CALCIUM SERPL-MCNC: 8.4 MG/DL (ref 8.7–10.5)
CHLORIDE SERPL-SCNC: 95 MMOL/L (ref 95–110)
CLARITY UR: CLEAR
CO2 SERPL-SCNC: 13 MMOL/L (ref 23–29)
COLOR UR: YELLOW
CREAT SERPL-MCNC: 2.1 MG/DL (ref 0.5–1.4)
DELSYS: ABNORMAL
DIFFERENTIAL METHOD: ABNORMAL
EOSINOPHIL # BLD AUTO: 0 K/UL (ref 0–0.5)
EOSINOPHIL NFR BLD: 0 % (ref 0–8)
ERYTHROCYTE [DISTWIDTH] IN BLOOD BY AUTOMATED COUNT: 19.1 % (ref 11.5–14.5)
EST. GFR  (AFRICAN AMERICAN): 42.4 ML/MIN/1.73 M^2
EST. GFR  (NON AFRICAN AMERICAN): 36.6 ML/MIN/1.73 M^2
ETHANOL SERPL-MCNC: 125 MG/DL
FIO2: 0.21
GLUCOSE SERPL-MCNC: 106 MG/DL (ref 70–110)
GLUCOSE SERPL-MCNC: 107 MG/DL (ref 70–110)
GLUCOSE SERPL-MCNC: 119 MG/DL (ref 70–110)
GLUCOSE SERPL-MCNC: 130 MG/DL (ref 70–110)
GLUCOSE SERPL-MCNC: 150 MG/DL (ref 70–110)
GLUCOSE SERPL-MCNC: 34 MG/DL (ref 70–110)
GLUCOSE SERPL-MCNC: 38 MG/DL (ref 70–110)
GLUCOSE SERPL-MCNC: 93 MG/DL (ref 70–110)
GLUCOSE UR QL STRIP: NEGATIVE
HCO3 UR-SCNC: 12.1 MMOL/L (ref 24–28)
HCT VFR BLD AUTO: 32.3 % (ref 40–54)
HGB BLD-MCNC: 10.7 G/DL (ref 14–18)
HGB UR QL STRIP: NEGATIVE
IMM GRANULOCYTES # BLD AUTO: 0.07 K/UL (ref 0–0.04)
IMM GRANULOCYTES NFR BLD AUTO: 0.8 % (ref 0–0.5)
KETONES UR QL STRIP: ABNORMAL
LACTATE SERPL-SCNC: 12.3 MMOL/L (ref 0.5–1.9)
LACTATE SERPL-SCNC: 12.9 MMOL/L (ref 0.5–1.9)
LACTATE SERPL-SCNC: 8.8 MMOL/L (ref 0.5–1.9)
LEUKOCYTE ESTERASE UR QL STRIP: NEGATIVE
LYMPHOCYTES # BLD AUTO: 0.8 K/UL (ref 1–4.8)
LYMPHOCYTES NFR BLD: 9 % (ref 18–48)
MCH RBC QN AUTO: 37.5 PG (ref 27–31)
MCHC RBC AUTO-ENTMCNC: 33.1 G/DL (ref 32–36)
MCV RBC AUTO: 113 FL (ref 82–98)
MODE: ABNORMAL
MONOCYTES # BLD AUTO: 0.6 K/UL (ref 0.3–1)
MONOCYTES NFR BLD: 7.2 % (ref 4–15)
NEUTROPHILS # BLD AUTO: 7.1 K/UL (ref 1.8–7.7)
NEUTROPHILS NFR BLD: 82.7 % (ref 38–73)
NITRITE UR QL STRIP: NEGATIVE
NRBC BLD-RTO: 0 /100 WBC
PCO2 BLDA: 26.6 MMHG (ref 35–45)
PH SMN: 7.27 [PH] (ref 7.35–7.45)
PH UR STRIP: 6 [PH] (ref 5–8)
PLATELET # BLD AUTO: 67 K/UL (ref 150–350)
PMV BLD AUTO: 11.5 FL (ref 9.2–12.9)
PO2 BLDA: 106 MMHG (ref 80–100)
POC BE: -15 MMOL/L
POC SATURATED O2: 97 % (ref 95–100)
POC TCO2: 13 MMOL/L (ref 23–27)
POTASSIUM SERPL-SCNC: 4.1 MMOL/L (ref 3.5–5.1)
PROT SERPL-MCNC: 9.7 G/DL (ref 6–8.4)
PROT UR QL STRIP: ABNORMAL
RBC # BLD AUTO: 2.85 M/UL (ref 4.6–6.2)
SAMPLE: ABNORMAL
SARS-COV-2 RDRP RESP QL NAA+PROBE: NEGATIVE
SITE: ABNORMAL
SODIUM SERPL-SCNC: 138 MMOL/L (ref 136–145)
SP GR UR STRIP: 1.02 (ref 1–1.03)
URN SPEC COLLECT METH UR: ABNORMAL
UROBILINOGEN UR STRIP-ACNC: ABNORMAL EU/DL
WBC # BLD AUTO: 8.64 K/UL (ref 3.9–12.7)

## 2021-02-15 PROCEDURE — 93005 ELECTROCARDIOGRAM TRACING: CPT | Performed by: INTERNAL MEDICINE

## 2021-02-15 PROCEDURE — 93010 EKG 12-LEAD: ICD-10-PCS | Mod: ,,, | Performed by: INTERNAL MEDICINE

## 2021-02-15 PROCEDURE — 36415 COLL VENOUS BLD VENIPUNCTURE: CPT

## 2021-02-15 PROCEDURE — 82105 ALPHA-FETOPROTEIN SERUM: CPT

## 2021-02-15 PROCEDURE — 82077 ASSAY SPEC XCP UR&BREATH IA: CPT

## 2021-02-15 PROCEDURE — 93010 ELECTROCARDIOGRAM REPORT: CPT | Mod: ,,, | Performed by: INTERNAL MEDICINE

## 2021-02-15 PROCEDURE — 25000003 PHARM REV CODE 250: Performed by: EMERGENCY MEDICINE

## 2021-02-15 PROCEDURE — 80053 COMPREHEN METABOLIC PANEL: CPT

## 2021-02-15 PROCEDURE — 96374 THER/PROPH/DIAG INJ IV PUSH: CPT

## 2021-02-15 PROCEDURE — 99900031 HC PATIENT EDUCATION (STAT)

## 2021-02-15 PROCEDURE — 25000003 PHARM REV CODE 250: Performed by: INTERNAL MEDICINE

## 2021-02-15 PROCEDURE — 96375 TX/PRO/DX INJ NEW DRUG ADDON: CPT

## 2021-02-15 PROCEDURE — 82010 KETONE BODYS QUAN: CPT

## 2021-02-15 PROCEDURE — 80307 DRUG TEST PRSMV CHEM ANLYZR: CPT

## 2021-02-15 PROCEDURE — 82962 GLUCOSE BLOOD TEST: CPT

## 2021-02-15 PROCEDURE — 12000002 HC ACUTE/MED SURGE SEMI-PRIVATE ROOM

## 2021-02-15 PROCEDURE — 25000003 PHARM REV CODE 250: Performed by: FAMILY MEDICINE

## 2021-02-15 PROCEDURE — 94761 N-INVAS EAR/PLS OXIMETRY MLT: CPT

## 2021-02-15 PROCEDURE — 85025 COMPLETE CBC W/AUTO DIFF WBC: CPT

## 2021-02-15 PROCEDURE — 63600175 PHARM REV CODE 636 W HCPCS: Performed by: EMERGENCY MEDICINE

## 2021-02-15 PROCEDURE — S0030 INJECTION, METRONIDAZOLE: HCPCS | Performed by: FAMILY MEDICINE

## 2021-02-15 PROCEDURE — 80143 DRUG ASSAY ACETAMINOPHEN: CPT

## 2021-02-15 PROCEDURE — 36600 WITHDRAWAL OF ARTERIAL BLOOD: CPT

## 2021-02-15 PROCEDURE — 83605 ASSAY OF LACTIC ACID: CPT | Mod: 91

## 2021-02-15 PROCEDURE — U0002 COVID-19 LAB TEST NON-CDC: HCPCS

## 2021-02-15 PROCEDURE — 99285 EMERGENCY DEPT VISIT HI MDM: CPT | Mod: 25

## 2021-02-15 PROCEDURE — 81003 URINALYSIS AUTO W/O SCOPE: CPT

## 2021-02-15 PROCEDURE — 63600175 PHARM REV CODE 636 W HCPCS: Performed by: FAMILY MEDICINE

## 2021-02-15 PROCEDURE — 82803 BLOOD GASES ANY COMBINATION: CPT

## 2021-02-15 PROCEDURE — 99900035 HC TECH TIME PER 15 MIN (STAT)

## 2021-02-15 PROCEDURE — 96361 HYDRATE IV INFUSION ADD-ON: CPT

## 2021-02-15 RX ORDER — IPRATROPIUM BROMIDE AND ALBUTEROL SULFATE 2.5; .5 MG/3ML; MG/3ML
3 SOLUTION RESPIRATORY (INHALATION) EVERY 6 HOURS PRN
Status: DISCONTINUED | OUTPATIENT
Start: 2021-02-15 | End: 2021-02-17 | Stop reason: HOSPADM

## 2021-02-15 RX ORDER — PENTOXIFYLLINE 400 MG/1
400 TABLET, EXTENDED RELEASE ORAL 2 TIMES DAILY
Status: DISCONTINUED | OUTPATIENT
Start: 2021-02-15 | End: 2021-02-17 | Stop reason: HOSPADM

## 2021-02-15 RX ORDER — DEXTROSE MONOHYDRATE AND SODIUM CHLORIDE 5; .9 G/100ML; G/100ML
INJECTION, SOLUTION INTRAVENOUS
Status: COMPLETED | OUTPATIENT
Start: 2021-02-15 | End: 2021-02-15

## 2021-02-15 RX ORDER — SODIUM CHLORIDE 0.9 % (FLUSH) 0.9 %
2 SYRINGE (ML) INJECTION
Status: DISCONTINUED | OUTPATIENT
Start: 2021-02-15 | End: 2021-02-17 | Stop reason: HOSPADM

## 2021-02-15 RX ORDER — ONDANSETRON 2 MG/ML
4 INJECTION INTRAMUSCULAR; INTRAVENOUS EVERY 8 HOURS PRN
Status: DISCONTINUED | OUTPATIENT
Start: 2021-02-15 | End: 2021-02-17 | Stop reason: HOSPADM

## 2021-02-15 RX ORDER — SUCRALFATE 1 G/1
1 TABLET ORAL 4 TIMES DAILY
Status: DISCONTINUED | OUTPATIENT
Start: 2021-02-15 | End: 2021-02-17 | Stop reason: HOSPADM

## 2021-02-15 RX ORDER — ONDANSETRON 2 MG/ML
4 INJECTION INTRAMUSCULAR; INTRAVENOUS
Status: COMPLETED | OUTPATIENT
Start: 2021-02-15 | End: 2021-02-15

## 2021-02-15 RX ORDER — SODIUM CHLORIDE, SODIUM LACTATE, POTASSIUM CHLORIDE, CALCIUM CHLORIDE 600; 310; 30; 20 MG/100ML; MG/100ML; MG/100ML; MG/100ML
INJECTION, SOLUTION INTRAVENOUS CONTINUOUS
Status: DISCONTINUED | OUTPATIENT
Start: 2021-02-15 | End: 2021-02-16

## 2021-02-15 RX ORDER — LEVOFLOXACIN 5 MG/ML
500 INJECTION, SOLUTION INTRAVENOUS
Status: DISCONTINUED | OUTPATIENT
Start: 2021-02-15 | End: 2021-02-15 | Stop reason: DRUGHIGH

## 2021-02-15 RX ORDER — ACETAMINOPHEN 325 MG/1
650 TABLET ORAL EVERY 8 HOURS PRN
Status: DISCONTINUED | OUTPATIENT
Start: 2021-02-15 | End: 2021-02-17 | Stop reason: HOSPADM

## 2021-02-15 RX ORDER — FAMOTIDINE 20 MG/1
20 TABLET, FILM COATED ORAL DAILY
Status: DISCONTINUED | OUTPATIENT
Start: 2021-02-16 | End: 2021-02-17 | Stop reason: HOSPADM

## 2021-02-15 RX ORDER — SYRING-NEEDL,DISP,INSUL,0.3 ML 29 G X1/2"
296 SYRINGE, EMPTY DISPOSABLE MISCELLANEOUS 2 TIMES DAILY
Status: DISCONTINUED | OUTPATIENT
Start: 2021-02-16 | End: 2021-02-17 | Stop reason: HOSPADM

## 2021-02-15 RX ORDER — THIAMINE HCL 100 MG
100 TABLET ORAL DAILY
Status: DISCONTINUED | OUTPATIENT
Start: 2021-02-16 | End: 2021-02-17 | Stop reason: HOSPADM

## 2021-02-15 RX ORDER — METRONIDAZOLE 500 MG/100ML
500 INJECTION, SOLUTION INTRAVENOUS
Status: DISCONTINUED | OUTPATIENT
Start: 2021-02-15 | End: 2021-02-17 | Stop reason: HOSPADM

## 2021-02-15 RX ORDER — BISACODYL 5 MG
10 TABLET, DELAYED RELEASE (ENTERIC COATED) ORAL ONCE
Status: COMPLETED | OUTPATIENT
Start: 2021-02-15 | End: 2021-02-15

## 2021-02-15 RX ORDER — LEVOFLOXACIN 5 MG/ML
250 INJECTION, SOLUTION INTRAVENOUS
Status: DISCONTINUED | OUTPATIENT
Start: 2021-02-16 | End: 2021-02-17

## 2021-02-15 RX ORDER — POLYETHYLENE GLYCOL 3350 17 G/17G
17 POWDER, FOR SOLUTION ORAL DAILY PRN
Status: DISCONTINUED | OUTPATIENT
Start: 2021-02-15 | End: 2021-02-17 | Stop reason: HOSPADM

## 2021-02-15 RX ORDER — ACETAMINOPHEN 325 MG/1
650 TABLET ORAL EVERY 4 HOURS PRN
Status: DISCONTINUED | OUTPATIENT
Start: 2021-02-15 | End: 2021-02-17 | Stop reason: HOSPADM

## 2021-02-15 RX ORDER — FOLIC ACID 1 MG/1
1 TABLET ORAL DAILY
Status: DISCONTINUED | OUTPATIENT
Start: 2021-02-16 | End: 2021-02-17 | Stop reason: HOSPADM

## 2021-02-15 RX ORDER — SODIUM CHLORIDE 9 MG/ML
INJECTION, SOLUTION INTRAVENOUS
Status: COMPLETED | OUTPATIENT
Start: 2021-02-15 | End: 2021-02-15

## 2021-02-15 RX ORDER — LEVOFLOXACIN 5 MG/ML
500 INJECTION, SOLUTION INTRAVENOUS ONCE
Status: COMPLETED | OUTPATIENT
Start: 2021-02-15 | End: 2021-02-15

## 2021-02-15 RX ORDER — ACETAMINOPHEN 500 MG
1000 TABLET ORAL
Status: COMPLETED | OUTPATIENT
Start: 2021-02-15 | End: 2021-02-15

## 2021-02-15 RX ORDER — THIAMINE HCL 100 MG
100 TABLET ORAL DAILY
Status: DISCONTINUED | OUTPATIENT
Start: 2021-02-15 | End: 2021-02-15

## 2021-02-15 RX ORDER — TALC
6 POWDER (GRAM) TOPICAL NIGHTLY PRN
Status: DISCONTINUED | OUTPATIENT
Start: 2021-02-15 | End: 2021-02-17 | Stop reason: HOSPADM

## 2021-02-15 RX ORDER — PROPRANOLOL HYDROCHLORIDE 10 MG/1
10 TABLET ORAL 2 TIMES DAILY
Status: DISCONTINUED | OUTPATIENT
Start: 2021-02-15 | End: 2021-02-15

## 2021-02-15 RX ORDER — DEXTROSE 50 % IN WATER (D50W) INTRAVENOUS SYRINGE
25
Status: COMPLETED | OUTPATIENT
Start: 2021-02-15 | End: 2021-02-15

## 2021-02-15 RX ORDER — GABAPENTIN 300 MG/1
300 CAPSULE ORAL 3 TIMES DAILY
Status: DISCONTINUED | OUTPATIENT
Start: 2021-02-15 | End: 2021-02-17 | Stop reason: HOSPADM

## 2021-02-15 RX ORDER — PANTOPRAZOLE SODIUM 40 MG/1
40 TABLET, DELAYED RELEASE ORAL DAILY
Status: DISCONTINUED | OUTPATIENT
Start: 2021-02-15 | End: 2021-02-15

## 2021-02-15 RX ADMIN — SUCRALFATE 1 G: 1 TABLET ORAL at 04:02

## 2021-02-15 RX ADMIN — SODIUM CHLORIDE, SODIUM LACTATE, POTASSIUM CHLORIDE, AND CALCIUM CHLORIDE: .6; .31; .03; .02 INJECTION, SOLUTION INTRAVENOUS at 12:02

## 2021-02-15 RX ADMIN — ACETAMINOPHEN 1000 MG: 500 TABLET, FILM COATED ORAL at 08:02

## 2021-02-15 RX ADMIN — PANTOPRAZOLE SODIUM 40 MG: 40 TABLET, DELAYED RELEASE ORAL at 01:02

## 2021-02-15 RX ADMIN — GABAPENTIN 300 MG: 300 CAPSULE ORAL at 09:02

## 2021-02-15 RX ADMIN — DEXTROSE MONOHYDRATE 25 G: 25 INJECTION, SOLUTION INTRAVENOUS at 06:02

## 2021-02-15 RX ADMIN — LEVOFLOXACIN 500 MG: 500 INJECTION, SOLUTION INTRAVENOUS at 12:02

## 2021-02-15 RX ADMIN — THERA TABS 1 TABLET: TAB at 01:02

## 2021-02-15 RX ADMIN — SUCRALFATE 1 G: 1 TABLET ORAL at 09:02

## 2021-02-15 RX ADMIN — ACETAMINOPHEN 650 MG: 325 TABLET, FILM COATED ORAL at 09:02

## 2021-02-15 RX ADMIN — BISACODYL 10 MG: 5 TABLET, COATED ORAL at 04:02

## 2021-02-15 RX ADMIN — PENTOXIFYLLINE 400 MG: 400 TABLET, EXTENDED RELEASE ORAL at 09:02

## 2021-02-15 RX ADMIN — ONDANSETRON 4 MG: 2 INJECTION INTRAMUSCULAR; INTRAVENOUS at 07:02

## 2021-02-15 RX ADMIN — ACETAMINOPHEN 650 MG: 325 TABLET, FILM COATED ORAL at 03:02

## 2021-02-15 RX ADMIN — METRONIDAZOLE 500 MG: 500 INJECTION, SOLUTION INTRAVENOUS at 12:02

## 2021-02-15 RX ADMIN — GABAPENTIN 300 MG: 300 CAPSULE ORAL at 02:02

## 2021-02-15 RX ADMIN — DEXTROSE AND SODIUM CHLORIDE: 5; .9 INJECTION, SOLUTION INTRAVENOUS at 08:02

## 2021-02-15 RX ADMIN — SUCRALFATE 1 G: 1 TABLET ORAL at 01:02

## 2021-02-15 RX ADMIN — METRONIDAZOLE 500 MG: 500 INJECTION, SOLUTION INTRAVENOUS at 09:02

## 2021-02-15 RX ADMIN — PROPRANOLOL HYDROCHLORIDE 10 MG: 10 TABLET ORAL at 01:02

## 2021-02-15 RX ADMIN — MELATONIN 6 MG: at 09:02

## 2021-02-15 RX ADMIN — SODIUM BICARBONATE: 84 INJECTION, SOLUTION INTRAVENOUS at 12:02

## 2021-02-15 RX ADMIN — SODIUM CHLORIDE: 0.9 INJECTION, SOLUTION INTRAVENOUS at 07:02

## 2021-02-15 RX ADMIN — THIAMINE HYDROCHLORIDE: 100 INJECTION, SOLUTION INTRAMUSCULAR; INTRAVENOUS at 04:02

## 2021-02-16 PROBLEM — E16.2 HYPOGLYCEMIA: Status: RESOLVED | Noted: 2021-02-15 | Resolved: 2021-02-16

## 2021-02-16 PROBLEM — R11.2 NAUSEA & VOMITING: Status: RESOLVED | Noted: 2021-02-15 | Resolved: 2021-02-16

## 2021-02-16 PROBLEM — E87.29 HIGH ANION GAP METABOLIC ACIDOSIS: Status: RESOLVED | Noted: 2021-02-15 | Resolved: 2021-02-16

## 2021-02-16 PROBLEM — F10.10 ALCOHOL ABUSE: Status: ACTIVE | Noted: 2021-02-16

## 2021-02-16 PROBLEM — R11.2 NAUSEA AND VOMITING: Status: RESOLVED | Noted: 2020-09-08 | Resolved: 2021-02-16

## 2021-02-16 PROBLEM — A09 INFECTIOUS COLITIS: Status: ACTIVE | Noted: 2021-02-16

## 2021-02-16 PROBLEM — R55 SYNCOPE: Status: RESOLVED | Noted: 2020-11-11 | Resolved: 2021-02-16

## 2021-02-16 LAB
ALBUMIN SERPL BCP-MCNC: 3 G/DL (ref 3.5–5.2)
ALP SERPL-CCNC: 94 U/L (ref 55–135)
ALT SERPL W/O P-5'-P-CCNC: 62 U/L (ref 10–44)
ANION GAP SERPL CALC-SCNC: 14 MMOL/L (ref 8–16)
APTT PPP: 40.6 SEC (ref 23.6–33.3)
AST SERPL-CCNC: 178 U/L (ref 10–40)
BASOPHILS # BLD AUTO: 0.01 K/UL (ref 0–0.2)
BASOPHILS NFR BLD: 0.2 % (ref 0–1.9)
BILIRUB SERPL-MCNC: 8.7 MG/DL (ref 0.1–1)
BUN SERPL-MCNC: 26 MG/DL (ref 6–20)
CALCIUM SERPL-MCNC: 8 MG/DL (ref 8.7–10.5)
CHLORIDE SERPL-SCNC: 98 MMOL/L (ref 95–110)
CO2 SERPL-SCNC: 25 MMOL/L (ref 23–29)
CREAT SERPL-MCNC: 1.8 MG/DL (ref 0.5–1.4)
DIFFERENTIAL METHOD: ABNORMAL
EOSINOPHIL # BLD AUTO: 0 K/UL (ref 0–0.5)
EOSINOPHIL NFR BLD: 0 % (ref 0–8)
ERYTHROCYTE [DISTWIDTH] IN BLOOD BY AUTOMATED COUNT: 18.8 % (ref 11.5–14.5)
EST. GFR  (AFRICAN AMERICAN): 51 ML/MIN/1.73 M^2
EST. GFR  (NON AFRICAN AMERICAN): 44.2 ML/MIN/1.73 M^2
GLUCOSE SERPL-MCNC: 118 MG/DL (ref 70–110)
GLUCOSE SERPL-MCNC: 138 MG/DL (ref 70–110)
GLUCOSE SERPL-MCNC: 143 MG/DL (ref 70–110)
GLUCOSE SERPL-MCNC: 155 MG/DL (ref 70–110)
HCT VFR BLD AUTO: 25.7 % (ref 40–54)
HGB BLD-MCNC: 8.8 G/DL (ref 14–18)
IMM GRANULOCYTES # BLD AUTO: 0.03 K/UL (ref 0–0.04)
IMM GRANULOCYTES NFR BLD AUTO: 0.5 % (ref 0–0.5)
INR PPP: 2.1
LACTATE SERPL-SCNC: 1.4 MMOL/L (ref 0.5–1.9)
LYMPHOCYTES # BLD AUTO: 1 K/UL (ref 1–4.8)
LYMPHOCYTES NFR BLD: 15.7 % (ref 18–48)
MAGNESIUM SERPL-MCNC: 1.5 MG/DL (ref 1.6–2.6)
MCH RBC QN AUTO: 37.8 PG (ref 27–31)
MCHC RBC AUTO-ENTMCNC: 34.2 G/DL (ref 32–36)
MCV RBC AUTO: 110 FL (ref 82–98)
MONOCYTES # BLD AUTO: 0.7 K/UL (ref 0.3–1)
MONOCYTES NFR BLD: 11.1 % (ref 4–15)
NEUTROPHILS # BLD AUTO: 4.4 K/UL (ref 1.8–7.7)
NEUTROPHILS NFR BLD: 72.5 % (ref 38–73)
NRBC BLD-RTO: 0 /100 WBC
PHOSPHATE SERPL-MCNC: 2.3 MG/DL (ref 2.7–4.5)
PLATELET # BLD AUTO: 53 K/UL (ref 150–350)
PMV BLD AUTO: 12.2 FL (ref 9.2–12.9)
POTASSIUM SERPL-SCNC: 3.2 MMOL/L (ref 3.5–5.1)
PROT SERPL-MCNC: 7.7 G/DL (ref 6–8.4)
PROTHROMBIN TIME: 22.7 SEC (ref 10.6–14.8)
RBC # BLD AUTO: 2.33 M/UL (ref 4.6–6.2)
SODIUM SERPL-SCNC: 137 MMOL/L (ref 136–145)
WBC # BLD AUTO: 6.05 K/UL (ref 3.9–12.7)

## 2021-02-16 PROCEDURE — 12000002 HC ACUTE/MED SURGE SEMI-PRIVATE ROOM

## 2021-02-16 PROCEDURE — 80053 COMPREHEN METABOLIC PANEL: CPT

## 2021-02-16 PROCEDURE — S0030 INJECTION, METRONIDAZOLE: HCPCS | Performed by: FAMILY MEDICINE

## 2021-02-16 PROCEDURE — 36415 COLL VENOUS BLD VENIPUNCTURE: CPT

## 2021-02-16 PROCEDURE — 25000003 PHARM REV CODE 250: Performed by: FAMILY MEDICINE

## 2021-02-16 PROCEDURE — 85730 THROMBOPLASTIN TIME PARTIAL: CPT

## 2021-02-16 PROCEDURE — 99900035 HC TECH TIME PER 15 MIN (STAT)

## 2021-02-16 PROCEDURE — 85610 PROTHROMBIN TIME: CPT

## 2021-02-16 PROCEDURE — 25000003 PHARM REV CODE 250: Performed by: INTERNAL MEDICINE

## 2021-02-16 PROCEDURE — 83735 ASSAY OF MAGNESIUM: CPT

## 2021-02-16 PROCEDURE — 94761 N-INVAS EAR/PLS OXIMETRY MLT: CPT

## 2021-02-16 PROCEDURE — 63600175 PHARM REV CODE 636 W HCPCS: Performed by: FAMILY MEDICINE

## 2021-02-16 PROCEDURE — 84100 ASSAY OF PHOSPHORUS: CPT

## 2021-02-16 PROCEDURE — 83605 ASSAY OF LACTIC ACID: CPT

## 2021-02-16 PROCEDURE — 85025 COMPLETE CBC W/AUTO DIFF WBC: CPT

## 2021-02-16 PROCEDURE — 94760 N-INVAS EAR/PLS OXIMETRY 1: CPT

## 2021-02-16 RX ORDER — BISACODYL 5 MG
5 TABLET, DELAYED RELEASE (ENTERIC COATED) ORAL DAILY PRN
Status: DISCONTINUED | OUTPATIENT
Start: 2021-02-16 | End: 2021-02-17 | Stop reason: HOSPADM

## 2021-02-16 RX ORDER — CALCIUM CHLORIDE IN 0.9 % NACL 1 G/100 ML
1 INTRAVENOUS SOLUTION, PIGGYBACK (ML) INTRAVENOUS
Status: DISCONTINUED | OUTPATIENT
Start: 2021-02-16 | End: 2021-02-17 | Stop reason: HOSPADM

## 2021-02-16 RX ORDER — LANOLIN ALCOHOL/MO/W.PET/CERES
800 CREAM (GRAM) TOPICAL
Status: DISCONTINUED | OUTPATIENT
Start: 2021-02-16 | End: 2021-02-17 | Stop reason: HOSPADM

## 2021-02-16 RX ORDER — POTASSIUM CHLORIDE 7.45 MG/ML
20 INJECTION INTRAVENOUS
Status: DISCONTINUED | OUTPATIENT
Start: 2021-02-16 | End: 2021-02-17 | Stop reason: HOSPADM

## 2021-02-16 RX ORDER — POTASSIUM CHLORIDE 20 MEQ/1
40 TABLET, EXTENDED RELEASE ORAL
Status: DISCONTINUED | OUTPATIENT
Start: 2021-02-16 | End: 2021-02-17 | Stop reason: HOSPADM

## 2021-02-16 RX ORDER — POTASSIUM CHLORIDE 20 MEQ/1
20 TABLET, EXTENDED RELEASE ORAL
Status: DISCONTINUED | OUTPATIENT
Start: 2021-02-16 | End: 2021-02-17 | Stop reason: HOSPADM

## 2021-02-16 RX ORDER — POTASSIUM CHLORIDE 7.45 MG/ML
40 INJECTION INTRAVENOUS
Status: DISCONTINUED | OUTPATIENT
Start: 2021-02-16 | End: 2021-02-17 | Stop reason: HOSPADM

## 2021-02-16 RX ORDER — LORAZEPAM 2 MG/ML
2 INJECTION INTRAMUSCULAR EVERY 10 MIN PRN
Status: DISCONTINUED | OUTPATIENT
Start: 2021-02-16 | End: 2021-02-17 | Stop reason: HOSPADM

## 2021-02-16 RX ORDER — CHLORDIAZEPOXIDE HYDROCHLORIDE 25 MG/1
25 CAPSULE, GELATIN COATED ORAL ONCE
Status: COMPLETED | OUTPATIENT
Start: 2021-02-16 | End: 2021-02-16

## 2021-02-16 RX ORDER — MAGNESIUM SULFATE 1 G/100ML
1 INJECTION INTRAVENOUS
Status: DISCONTINUED | OUTPATIENT
Start: 2021-02-16 | End: 2021-02-17 | Stop reason: HOSPADM

## 2021-02-16 RX ORDER — MAGNESIUM SULFATE HEPTAHYDRATE 40 MG/ML
2 INJECTION, SOLUTION INTRAVENOUS
Status: DISCONTINUED | OUTPATIENT
Start: 2021-02-16 | End: 2021-02-17 | Stop reason: HOSPADM

## 2021-02-16 RX ORDER — MAGNESIUM SULFATE HEPTAHYDRATE 40 MG/ML
4 INJECTION, SOLUTION INTRAVENOUS
Status: DISCONTINUED | OUTPATIENT
Start: 2021-02-16 | End: 2021-02-17 | Stop reason: HOSPADM

## 2021-02-16 RX ORDER — DEXTROSE MONOHYDRATE, SODIUM CHLORIDE, AND POTASSIUM CHLORIDE 50; 1.49; 4.5 G/1000ML; G/1000ML; G/1000ML
INJECTION, SOLUTION INTRAVENOUS CONTINUOUS
Status: DISCONTINUED | OUTPATIENT
Start: 2021-02-16 | End: 2021-02-17 | Stop reason: HOSPADM

## 2021-02-16 RX ADMIN — SUCRALFATE 1 G: 1 TABLET ORAL at 09:02

## 2021-02-16 RX ADMIN — METRONIDAZOLE 500 MG: 500 INJECTION, SOLUTION INTRAVENOUS at 03:02

## 2021-02-16 RX ADMIN — GABAPENTIN 300 MG: 300 CAPSULE ORAL at 10:02

## 2021-02-16 RX ADMIN — FAMOTIDINE 20 MG: 20 TABLET ORAL at 10:02

## 2021-02-16 RX ADMIN — METRONIDAZOLE 500 MG: 500 INJECTION, SOLUTION INTRAVENOUS at 11:02

## 2021-02-16 RX ADMIN — METRONIDAZOLE 500 MG: 500 INJECTION, SOLUTION INTRAVENOUS at 09:02

## 2021-02-16 RX ADMIN — Medication 296 ML: at 09:02

## 2021-02-16 RX ADMIN — THERA TABS 1 TABLET: TAB at 10:02

## 2021-02-16 RX ADMIN — FOLIC ACID 1 MG: 1 TABLET ORAL at 10:02

## 2021-02-16 RX ADMIN — PENTOXIFYLLINE 400 MG: 400 TABLET, EXTENDED RELEASE ORAL at 10:02

## 2021-02-16 RX ADMIN — POTASSIUM CHLORIDE, DEXTROSE MONOHYDRATE AND SODIUM CHLORIDE: 150; 5; 450 INJECTION, SOLUTION INTRAVENOUS at 10:02

## 2021-02-16 RX ADMIN — THIAMINE HCL TAB 100 MG 100 MG: 100 TAB at 10:02

## 2021-02-16 RX ADMIN — SUCRALFATE 1 G: 1 TABLET ORAL at 05:02

## 2021-02-16 RX ADMIN — Medication 296 ML: at 10:02

## 2021-02-16 RX ADMIN — SODIUM BICARBONATE: 84 INJECTION, SOLUTION INTRAVENOUS at 01:02

## 2021-02-16 RX ADMIN — SUCRALFATE 1 G: 1 TABLET ORAL at 01:02

## 2021-02-16 RX ADMIN — CHLORDIAZEPOXIDE HYDROCHLORIDE 25 MG: 25 CAPSULE ORAL at 10:02

## 2021-02-16 RX ADMIN — PENTOXIFYLLINE 400 MG: 400 TABLET, EXTENDED RELEASE ORAL at 09:02

## 2021-02-16 RX ADMIN — GABAPENTIN 300 MG: 300 CAPSULE ORAL at 09:02

## 2021-02-16 RX ADMIN — GABAPENTIN 300 MG: 300 CAPSULE ORAL at 03:02

## 2021-02-16 RX ADMIN — LORAZEPAM 2 MG: 2 INJECTION INTRAMUSCULAR; INTRAVENOUS at 09:02

## 2021-02-16 RX ADMIN — LEVOFLOXACIN 250 MG: 5 INJECTION, SOLUTION INTRAVENOUS at 01:02

## 2021-02-17 ENCOUNTER — ANESTHESIA (OUTPATIENT)
Dept: SURGERY | Facility: HOSPITAL | Age: 46
DRG: 392 | End: 2021-02-17
Payer: MEDICAID

## 2021-02-17 ENCOUNTER — ANESTHESIA EVENT (OUTPATIENT)
Dept: SURGERY | Facility: HOSPITAL | Age: 46
DRG: 392 | End: 2021-02-17
Payer: MEDICAID

## 2021-02-17 VITALS
HEIGHT: 72 IN | OXYGEN SATURATION: 97 % | DIASTOLIC BLOOD PRESSURE: 99 MMHG | HEART RATE: 85 BPM | WEIGHT: 182 LBS | RESPIRATION RATE: 16 BRPM | SYSTOLIC BLOOD PRESSURE: 168 MMHG | BODY MASS INDEX: 24.65 KG/M2 | TEMPERATURE: 99 F

## 2021-02-17 PROBLEM — E87.8 REFEEDING SYNDROME: Status: ACTIVE | Noted: 2021-02-17

## 2021-02-17 LAB
AFP-TM SERPL-MCNC: 3 NG/ML (ref 0–8.3)
ALBUMIN SERPL BCP-MCNC: 2.9 G/DL (ref 3.5–5.2)
ALP SERPL-CCNC: 88 U/L (ref 55–135)
ALT SERPL W/O P-5'-P-CCNC: 57 U/L (ref 10–44)
ANION GAP SERPL CALC-SCNC: 12 MMOL/L (ref 8–16)
AST SERPL-CCNC: 172 U/L (ref 10–40)
BASOPHILS # BLD AUTO: 0.02 K/UL (ref 0–0.2)
BASOPHILS NFR BLD: 0.4 % (ref 0–1.9)
BILIRUB SERPL-MCNC: 7.7 MG/DL (ref 0.1–1)
BUN SERPL-MCNC: 16 MG/DL (ref 6–20)
CALCIUM SERPL-MCNC: 8.6 MG/DL (ref 8.7–10.5)
CHLORIDE SERPL-SCNC: 99 MMOL/L (ref 95–110)
CO2 SERPL-SCNC: 28 MMOL/L (ref 23–29)
CREAT SERPL-MCNC: 1.6 MG/DL (ref 0.5–1.4)
DIFFERENTIAL METHOD: ABNORMAL
EOSINOPHIL # BLD AUTO: 0 K/UL (ref 0–0.5)
EOSINOPHIL NFR BLD: 0.2 % (ref 0–8)
ERYTHROCYTE [DISTWIDTH] IN BLOOD BY AUTOMATED COUNT: 19 % (ref 11.5–14.5)
EST. GFR  (AFRICAN AMERICAN): 58.9 ML/MIN/1.73 M^2
EST. GFR  (NON AFRICAN AMERICAN): 50.9 ML/MIN/1.73 M^2
GLUCOSE SERPL-MCNC: 118 MG/DL (ref 70–110)
GLUCOSE SERPL-MCNC: 128 MG/DL (ref 70–110)
GLUCOSE SERPL-MCNC: 91 MG/DL (ref 70–110)
HCT VFR BLD AUTO: 24.7 % (ref 40–54)
HGB BLD-MCNC: 8.5 G/DL (ref 14–18)
IMM GRANULOCYTES # BLD AUTO: 0.03 K/UL (ref 0–0.04)
IMM GRANULOCYTES NFR BLD AUTO: 0.5 % (ref 0–0.5)
LYMPHOCYTES # BLD AUTO: 1.2 K/UL (ref 1–4.8)
LYMPHOCYTES NFR BLD: 21.2 % (ref 18–48)
MAGNESIUM SERPL-MCNC: 1.8 MG/DL (ref 1.6–2.6)
MCH RBC QN AUTO: 37.4 PG (ref 27–31)
MCHC RBC AUTO-ENTMCNC: 34.4 G/DL (ref 32–36)
MCV RBC AUTO: 109 FL (ref 82–98)
MONOCYTES # BLD AUTO: 0.7 K/UL (ref 0.3–1)
MONOCYTES NFR BLD: 11.6 % (ref 4–15)
NEUTROPHILS # BLD AUTO: 3.7 K/UL (ref 1.8–7.7)
NEUTROPHILS NFR BLD: 66.1 % (ref 38–73)
NRBC BLD-RTO: 0 /100 WBC
PHOSPHATE SERPL-MCNC: 2.2 MG/DL (ref 2.7–4.5)
PLATELET # BLD AUTO: 43 K/UL (ref 150–350)
PMV BLD AUTO: 11.5 FL (ref 9.2–12.9)
POTASSIUM SERPL-SCNC: 3 MMOL/L (ref 3.5–5.1)
PROT SERPL-MCNC: 7 G/DL (ref 6–8.4)
RBC # BLD AUTO: 2.27 M/UL (ref 4.6–6.2)
SODIUM SERPL-SCNC: 139 MMOL/L (ref 136–145)
WBC # BLD AUTO: 5.62 K/UL (ref 3.9–12.7)

## 2021-02-17 PROCEDURE — 25000003 PHARM REV CODE 250: Performed by: INTERNAL MEDICINE

## 2021-02-17 PROCEDURE — 63600175 PHARM REV CODE 636 W HCPCS: Performed by: INTERNAL MEDICINE

## 2021-02-17 PROCEDURE — 37000008 HC ANESTHESIA 1ST 15 MINUTES: Performed by: INTERNAL MEDICINE

## 2021-02-17 PROCEDURE — S0030 INJECTION, METRONIDAZOLE: HCPCS | Performed by: FAMILY MEDICINE

## 2021-02-17 PROCEDURE — 99900035 HC TECH TIME PER 15 MIN (STAT)

## 2021-02-17 PROCEDURE — 25000003 PHARM REV CODE 250: Performed by: NURSE ANESTHETIST, CERTIFIED REGISTERED

## 2021-02-17 PROCEDURE — 80053 COMPREHEN METABOLIC PANEL: CPT

## 2021-02-17 PROCEDURE — 85025 COMPLETE CBC W/AUTO DIFF WBC: CPT

## 2021-02-17 PROCEDURE — 27000671 HC TUBING MICROBORE EXT: Performed by: STUDENT IN AN ORGANIZED HEALTH CARE EDUCATION/TRAINING PROGRAM

## 2021-02-17 PROCEDURE — 25000003 PHARM REV CODE 250: Performed by: FAMILY MEDICINE

## 2021-02-17 PROCEDURE — 27202103: Performed by: STUDENT IN AN ORGANIZED HEALTH CARE EDUCATION/TRAINING PROGRAM

## 2021-02-17 PROCEDURE — 27000675 HC TUBING MICRODRIP: Performed by: STUDENT IN AN ORGANIZED HEALTH CARE EDUCATION/TRAINING PROGRAM

## 2021-02-17 PROCEDURE — 84100 ASSAY OF PHOSPHORUS: CPT

## 2021-02-17 PROCEDURE — 27200043 HC FORCEPS, BIOPSY: Performed by: INTERNAL MEDICINE

## 2021-02-17 PROCEDURE — 27100019 HC AMBU BAG ADULT/PED: Performed by: STUDENT IN AN ORGANIZED HEALTH CARE EDUCATION/TRAINING PROGRAM

## 2021-02-17 PROCEDURE — 94761 N-INVAS EAR/PLS OXIMETRY MLT: CPT

## 2021-02-17 PROCEDURE — 45380 COLONOSCOPY AND BIOPSY: CPT | Performed by: INTERNAL MEDICINE

## 2021-02-17 PROCEDURE — 63600175 PHARM REV CODE 636 W HCPCS: Performed by: NURSE ANESTHETIST, CERTIFIED REGISTERED

## 2021-02-17 PROCEDURE — 83735 ASSAY OF MAGNESIUM: CPT

## 2021-02-17 PROCEDURE — 36415 COLL VENOUS BLD VENIPUNCTURE: CPT

## 2021-02-17 PROCEDURE — 37000009 HC ANESTHESIA EA ADD 15 MINS: Performed by: INTERNAL MEDICINE

## 2021-02-17 RX ORDER — PENTOXIFYLLINE 400 MG/1
400 TABLET, EXTENDED RELEASE ORAL 2 TIMES DAILY
Qty: 60 TABLET | Refills: 0 | Status: ON HOLD | OUTPATIENT
Start: 2021-02-17 | End: 2021-05-15 | Stop reason: HOSPADM

## 2021-02-17 RX ORDER — METRONIDAZOLE 500 MG/1
500 TABLET ORAL EVERY 12 HOURS
Qty: 10 TABLET | Refills: 0 | Status: SHIPPED | OUTPATIENT
Start: 2021-02-17 | End: 2021-02-22

## 2021-02-17 RX ORDER — FERROUS SULFATE 325(65) MG
325 TABLET, DELAYED RELEASE (ENTERIC COATED) ORAL DAILY
Qty: 30 TABLET | Refills: 0 | Status: SHIPPED | OUTPATIENT
Start: 2021-02-18 | End: 2022-02-18

## 2021-02-17 RX ORDER — POTASSIUM CHLORIDE 750 MG/1
10 CAPSULE, EXTENDED RELEASE ORAL ONCE
Status: COMPLETED | OUTPATIENT
Start: 2021-02-17 | End: 2021-02-17

## 2021-02-17 RX ORDER — SODIUM,POTASSIUM PHOSPHATES 280-250MG
1 POWDER IN PACKET (EA) ORAL
Status: DISCONTINUED | OUTPATIENT
Start: 2021-02-17 | End: 2021-02-17 | Stop reason: HOSPADM

## 2021-02-17 RX ORDER — POTASSIUM CHLORIDE 20 MEQ/1
40 TABLET, EXTENDED RELEASE ORAL 2 TIMES DAILY
Status: DISCONTINUED | OUTPATIENT
Start: 2021-02-17 | End: 2021-02-17 | Stop reason: HOSPADM

## 2021-02-17 RX ORDER — PREDNISOLONE SODIUM PHOSPHATE 15 MG/5ML
40 SOLUTION ORAL DAILY
Qty: 399 ML | Refills: 0 | Status: SHIPPED | OUTPATIENT
Start: 2021-02-17 | End: 2021-03-19

## 2021-02-17 RX ORDER — PROPOFOL 10 MG/ML
VIAL (ML) INTRAVENOUS
Status: DISCONTINUED | OUTPATIENT
Start: 2021-02-17 | End: 2021-02-17

## 2021-02-17 RX ORDER — LANOLIN ALCOHOL/MO/W.PET/CERES
100 CREAM (GRAM) TOPICAL DAILY
Status: ON HOLD | COMMUNITY
Start: 2021-02-18 | End: 2022-03-08 | Stop reason: ALTCHOICE

## 2021-02-17 RX ORDER — METHYLPREDNISOLONE 4 MG/1
40 TABLET ORAL DAILY
Status: DISCONTINUED | OUTPATIENT
Start: 2021-02-17 | End: 2021-02-17 | Stop reason: HOSPADM

## 2021-02-17 RX ORDER — FAMOTIDINE 20 MG/1
20 TABLET, FILM COATED ORAL DAILY
Qty: 30 TABLET | Refills: 0 | Status: SHIPPED | OUTPATIENT
Start: 2021-02-18 | End: 2021-05-03

## 2021-02-17 RX ORDER — FOLIC ACID 1 MG/1
1 TABLET ORAL DAILY
Qty: 30 TABLET | Refills: 0 | Status: ON HOLD | OUTPATIENT
Start: 2021-02-18 | End: 2022-05-04

## 2021-02-17 RX ORDER — LEVOFLOXACIN 5 MG/ML
500 INJECTION, SOLUTION INTRAVENOUS
Status: DISCONTINUED | OUTPATIENT
Start: 2021-02-17 | End: 2021-02-17 | Stop reason: HOSPADM

## 2021-02-17 RX ORDER — FERROUS SULFATE 325(65) MG
325 TABLET ORAL DAILY
Status: DISCONTINUED | OUTPATIENT
Start: 2021-02-17 | End: 2021-02-17 | Stop reason: HOSPADM

## 2021-02-17 RX ORDER — POTASSIUM CHLORIDE 20 MEQ/1
20 TABLET, EXTENDED RELEASE ORAL 2 TIMES DAILY
Status: DISCONTINUED | OUTPATIENT
Start: 2021-02-17 | End: 2021-02-17

## 2021-02-17 RX ORDER — POTASSIUM CHLORIDE 20 MEQ/1
40 TABLET, EXTENDED RELEASE ORAL 2 TIMES DAILY
Qty: 28 TABLET | Refills: 0 | Status: SHIPPED | OUTPATIENT
Start: 2021-02-17 | End: 2021-02-24

## 2021-02-17 RX ORDER — LEVOFLOXACIN 500 MG/1
500 TABLET, FILM COATED ORAL DAILY
Qty: 5 TABLET | Refills: 0 | Status: SHIPPED | OUTPATIENT
Start: 2021-02-17 | End: 2021-02-22

## 2021-02-17 RX ADMIN — SODIUM CHLORIDE: 0.9 INJECTION, SOLUTION INTRAVENOUS at 08:02

## 2021-02-17 RX ADMIN — PROPOFOL 50 MG: 10 INJECTION, EMULSION INTRAVENOUS at 08:02

## 2021-02-17 RX ADMIN — METRONIDAZOLE 500 MG: 500 INJECTION, SOLUTION INTRAVENOUS at 04:02

## 2021-02-17 RX ADMIN — PENTOXIFYLLINE 400 MG: 400 TABLET, EXTENDED RELEASE ORAL at 11:02

## 2021-02-17 RX ADMIN — FAMOTIDINE 20 MG: 20 TABLET ORAL at 11:02

## 2021-02-17 RX ADMIN — FERROUS SULFATE TAB 325 MG (65 MG ELEMENTAL FE) 325 MG: 325 (65 FE) TAB at 11:02

## 2021-02-17 RX ADMIN — FOLIC ACID 1 MG: 1 TABLET ORAL at 11:02

## 2021-02-17 RX ADMIN — THIAMINE HCL TAB 100 MG 100 MG: 100 TAB at 11:02

## 2021-02-17 RX ADMIN — POTASSIUM CHLORIDE 40 MEQ: 20 TABLET, EXTENDED RELEASE ORAL at 11:02

## 2021-02-17 RX ADMIN — METHYLPREDNISOLONE 40 MG: 4 TABLET ORAL at 12:02

## 2021-02-17 RX ADMIN — POTASSIUM CHLORIDE 10 MEQ: 750 CAPSULE, EXTENDED RELEASE ORAL at 11:02

## 2021-02-17 RX ADMIN — POTASSIUM, SODIUM PHOSPHATES 280 MG-160 MG-250 MG ORAL POWDER PACKET 1 PACKET: POWDER IN PACKET at 11:02

## 2021-02-17 RX ADMIN — THERA TABS 1 TABLET: TAB at 11:02

## 2021-04-28 ENCOUNTER — PATIENT MESSAGE (OUTPATIENT)
Dept: RESEARCH | Facility: HOSPITAL | Age: 46
End: 2021-04-28

## 2021-05-02 ENCOUNTER — HOSPITAL ENCOUNTER (INPATIENT)
Facility: HOSPITAL | Age: 46
LOS: 2 days | Discharge: HOME | DRG: 378 | End: 2021-05-04
Attending: EMERGENCY MEDICINE | Admitting: INTERNAL MEDICINE
Payer: MEDICAID

## 2021-05-02 DIAGNOSIS — R07.9 CHEST PAIN: ICD-10-CM

## 2021-05-02 DIAGNOSIS — K92.0 HEMATEMESIS: Primary | ICD-10-CM

## 2021-05-02 LAB
ABO + RH BLD: NORMAL
ALBUMIN SERPL BCP-MCNC: 3.8 G/DL (ref 3.5–5.2)
ALP SERPL-CCNC: 128 U/L (ref 55–135)
ALT SERPL W/O P-5'-P-CCNC: 46 U/L (ref 10–44)
ANION GAP SERPL CALC-SCNC: 15 MMOL/L (ref 8–16)
AST SERPL-CCNC: 104 U/L (ref 10–40)
BASOPHILS # BLD AUTO: 0.03 K/UL (ref 0–0.2)
BASOPHILS NFR BLD: 0.4 % (ref 0–1.9)
BILIRUB SERPL-MCNC: 7.5 MG/DL (ref 0.1–1)
BLD GP AB SCN CELLS X3 SERPL QL: NORMAL
BUN SERPL-MCNC: 12 MG/DL (ref 6–20)
CALCIUM SERPL-MCNC: 9.3 MG/DL (ref 8.7–10.5)
CHLORIDE SERPL-SCNC: 98 MMOL/L (ref 95–110)
CO2 SERPL-SCNC: 21 MMOL/L (ref 23–29)
CREAT SERPL-MCNC: 2 MG/DL (ref 0.5–1.4)
DIFFERENTIAL METHOD: ABNORMAL
EOSINOPHIL # BLD AUTO: 0 K/UL (ref 0–0.5)
EOSINOPHIL NFR BLD: 0.3 % (ref 0–8)
ERYTHROCYTE [DISTWIDTH] IN BLOOD BY AUTOMATED COUNT: 15.1 % (ref 11.5–14.5)
EST. GFR  (AFRICAN AMERICAN): 44.9 ML/MIN/1.73 M^2
EST. GFR  (NON AFRICAN AMERICAN): 38.9 ML/MIN/1.73 M^2
FIBRINOGEN PPP-MCNC: 193 MG/DL (ref 207–509)
GLUCOSE SERPL-MCNC: 104 MG/DL (ref 70–110)
HCT VFR BLD AUTO: 32.1 % (ref 40–54)
HGB BLD-MCNC: 10.5 G/DL (ref 14–18)
IMM GRANULOCYTES # BLD AUTO: 0.01 K/UL (ref 0–0.04)
IMM GRANULOCYTES NFR BLD AUTO: 0.1 % (ref 0–0.5)
INR PPP: 2
LIPASE SERPL-CCNC: 68 U/L (ref 4–60)
LYMPHOCYTES # BLD AUTO: 1.4 K/UL (ref 1–4.8)
LYMPHOCYTES NFR BLD: 20.2 % (ref 18–48)
MCH RBC QN AUTO: 34.7 PG (ref 27–31)
MCHC RBC AUTO-ENTMCNC: 32.7 G/DL (ref 32–36)
MCV RBC AUTO: 106 FL (ref 82–98)
MONOCYTES # BLD AUTO: 0.7 K/UL (ref 0.3–1)
MONOCYTES NFR BLD: 9.9 % (ref 4–15)
NEUTROPHILS # BLD AUTO: 4.8 K/UL (ref 1.8–7.7)
NEUTROPHILS NFR BLD: 69.1 % (ref 38–73)
NRBC BLD-RTO: 0 /100 WBC
PLATELET # BLD AUTO: 65 K/UL (ref 150–450)
PMV BLD AUTO: 10.5 FL (ref 9.2–12.9)
POTASSIUM SERPL-SCNC: 3.9 MMOL/L (ref 3.5–5.1)
PROT SERPL-MCNC: 9.6 G/DL (ref 6–8.4)
PROTHROMBIN TIME: 21.5 SEC (ref 11.8–14.3)
RBC # BLD AUTO: 3.03 M/UL (ref 4.6–6.2)
SODIUM SERPL-SCNC: 134 MMOL/L (ref 136–145)
WBC # BLD AUTO: 6.99 K/UL (ref 3.9–12.7)

## 2021-05-02 PROCEDURE — 85025 COMPLETE CBC W/AUTO DIFF WBC: CPT | Performed by: EMERGENCY MEDICINE

## 2021-05-02 PROCEDURE — 80053 COMPREHEN METABOLIC PANEL: CPT | Performed by: EMERGENCY MEDICINE

## 2021-05-02 PROCEDURE — 83690 ASSAY OF LIPASE: CPT | Performed by: EMERGENCY MEDICINE

## 2021-05-02 PROCEDURE — 85384 FIBRINOGEN ACTIVITY: CPT | Performed by: EMERGENCY MEDICINE

## 2021-05-02 PROCEDURE — 99291 CRITICAL CARE FIRST HOUR: CPT

## 2021-05-02 PROCEDURE — 36415 COLL VENOUS BLD VENIPUNCTURE: CPT | Performed by: EMERGENCY MEDICINE

## 2021-05-02 PROCEDURE — 85610 PROTHROMBIN TIME: CPT | Performed by: EMERGENCY MEDICINE

## 2021-05-02 PROCEDURE — 86900 BLOOD TYPING SEROLOGIC ABO: CPT | Performed by: EMERGENCY MEDICINE

## 2021-05-02 PROCEDURE — 20000000 HC ICU ROOM

## 2021-05-02 RX ORDER — PANTOPRAZOLE SODIUM 40 MG/10ML
80 INJECTION, POWDER, LYOPHILIZED, FOR SOLUTION INTRAVENOUS ONCE
Status: DISCONTINUED | OUTPATIENT
Start: 2021-05-03 | End: 2021-05-03

## 2021-05-02 RX ORDER — OCTREOTIDE ACETATE 100 UG/ML
50 INJECTION, SOLUTION INTRAVENOUS; SUBCUTANEOUS
Status: DISCONTINUED | OUTPATIENT
Start: 2021-05-02 | End: 2021-05-03

## 2021-05-03 PROBLEM — K92.0 HEMATEMESIS: Status: ACTIVE | Noted: 2021-05-03

## 2021-05-03 LAB
ALBUMIN SERPL BCP-MCNC: 3.7 G/DL (ref 3.5–5.2)
ALP SERPL-CCNC: 107 U/L (ref 55–135)
ALT SERPL W/O P-5'-P-CCNC: 44 U/L (ref 10–44)
ANION GAP SERPL CALC-SCNC: 14 MMOL/L (ref 8–16)
AST SERPL-CCNC: 93 U/L (ref 10–40)
BASOPHILS # BLD AUTO: 0.04 K/UL (ref 0–0.2)
BASOPHILS NFR BLD: 0.4 % (ref 0–1.9)
BILIRUB SERPL-MCNC: 7.1 MG/DL (ref 0.1–1)
BUN SERPL-MCNC: 14 MG/DL (ref 6–20)
CALCIUM SERPL-MCNC: 9.2 MG/DL (ref 8.7–10.5)
CHLORIDE SERPL-SCNC: 99 MMOL/L (ref 95–110)
CO2 SERPL-SCNC: 24 MMOL/L (ref 23–29)
CREAT SERPL-MCNC: 1.9 MG/DL (ref 0.5–1.4)
DIFFERENTIAL METHOD: ABNORMAL
EOSINOPHIL # BLD AUTO: 0.1 K/UL (ref 0–0.5)
EOSINOPHIL NFR BLD: 1.1 % (ref 0–8)
ERYTHROCYTE [DISTWIDTH] IN BLOOD BY AUTOMATED COUNT: 15.2 % (ref 11.5–14.5)
EST. GFR  (AFRICAN AMERICAN): 47.8 ML/MIN/1.73 M^2
EST. GFR  (NON AFRICAN AMERICAN): 41.4 ML/MIN/1.73 M^2
GLUCOSE SERPL-MCNC: 60 MG/DL (ref 70–110)
GLUCOSE SERPL-MCNC: 87 MG/DL (ref 70–110)
HCT VFR BLD AUTO: 27.6 % (ref 40–54)
HCT VFR BLD AUTO: 27.7 % (ref 40–54)
HCT VFR BLD AUTO: 30.3 % (ref 40–54)
HCT VFR BLD AUTO: 30.4 % (ref 40–54)
HCT VFR BLD AUTO: 30.4 % (ref 40–54)
HGB BLD-MCNC: 10 G/DL (ref 14–18)
HGB BLD-MCNC: 10.2 G/DL (ref 14–18)
HGB BLD-MCNC: 10.2 G/DL (ref 14–18)
HGB BLD-MCNC: 9.2 G/DL (ref 14–18)
HGB BLD-MCNC: 9.3 G/DL (ref 14–18)
IMM GRANULOCYTES # BLD AUTO: 0.03 K/UL (ref 0–0.04)
IMM GRANULOCYTES NFR BLD AUTO: 0.3 % (ref 0–0.5)
LYMPHOCYTES # BLD AUTO: 2.2 K/UL (ref 1–4.8)
LYMPHOCYTES NFR BLD: 24.9 % (ref 18–48)
MAGNESIUM SERPL-MCNC: 1.7 MG/DL (ref 1.6–2.6)
MCH RBC QN AUTO: 34.8 PG (ref 27–31)
MCHC RBC AUTO-ENTMCNC: 33.6 G/DL (ref 32–36)
MCV RBC AUTO: 104 FL (ref 82–98)
MONOCYTES # BLD AUTO: 1.1 K/UL (ref 0.3–1)
MONOCYTES NFR BLD: 12.3 % (ref 4–15)
NEUTROPHILS # BLD AUTO: 5.5 K/UL (ref 1.8–7.7)
NEUTROPHILS NFR BLD: 61 % (ref 38–73)
NRBC BLD-RTO: 0 /100 WBC
PLATELET # BLD AUTO: 64 K/UL (ref 150–450)
PMV BLD AUTO: 11.3 FL (ref 9.2–12.9)
POTASSIUM SERPL-SCNC: 4 MMOL/L (ref 3.5–5.1)
PROT SERPL-MCNC: 8.7 G/DL (ref 6–8.4)
RBC # BLD AUTO: 2.93 M/UL (ref 4.6–6.2)
SARS-COV-2 RDRP RESP QL NAA+PROBE: NEGATIVE
SODIUM SERPL-SCNC: 137 MMOL/L (ref 136–145)
WBC # BLD AUTO: 8.96 K/UL (ref 3.9–12.7)

## 2021-05-03 PROCEDURE — 36415 COLL VENOUS BLD VENIPUNCTURE: CPT | Performed by: INTERNAL MEDICINE

## 2021-05-03 PROCEDURE — 99152 MOD SED SAME PHYS/QHP 5/>YRS: CPT | Performed by: INTERNAL MEDICINE

## 2021-05-03 PROCEDURE — 25000003 PHARM REV CODE 250

## 2021-05-03 PROCEDURE — 63600175 PHARM REV CODE 636 W HCPCS: Performed by: INTERNAL MEDICINE

## 2021-05-03 PROCEDURE — 85018 HEMOGLOBIN: CPT | Mod: 91 | Performed by: INTERNAL MEDICINE

## 2021-05-03 PROCEDURE — 80053 COMPREHEN METABOLIC PANEL: CPT | Performed by: INTERNAL MEDICINE

## 2021-05-03 PROCEDURE — 99900035 HC TECH TIME PER 15 MIN (STAT)

## 2021-05-03 PROCEDURE — 96366 THER/PROPH/DIAG IV INF ADDON: CPT

## 2021-05-03 PROCEDURE — 25000003 PHARM REV CODE 250: Performed by: INTERNAL MEDICINE

## 2021-05-03 PROCEDURE — U0002 COVID-19 LAB TEST NON-CDC: HCPCS | Performed by: STUDENT IN AN ORGANIZED HEALTH CARE EDUCATION/TRAINING PROGRAM

## 2021-05-03 PROCEDURE — 85014 HEMATOCRIT: CPT | Mod: 91 | Performed by: INTERNAL MEDICINE

## 2021-05-03 PROCEDURE — 83735 ASSAY OF MAGNESIUM: CPT | Performed by: INTERNAL MEDICINE

## 2021-05-03 PROCEDURE — 96375 TX/PRO/DX INJ NEW DRUG ADDON: CPT

## 2021-05-03 PROCEDURE — 96368 THER/DIAG CONCURRENT INF: CPT

## 2021-05-03 PROCEDURE — 12000002 HC ACUTE/MED SURGE SEMI-PRIVATE ROOM

## 2021-05-03 PROCEDURE — 85025 COMPLETE CBC W/AUTO DIFF WBC: CPT | Performed by: INTERNAL MEDICINE

## 2021-05-03 PROCEDURE — C9113 INJ PANTOPRAZOLE SODIUM, VIA: HCPCS | Performed by: INTERNAL MEDICINE

## 2021-05-03 PROCEDURE — 43235 EGD DIAGNOSTIC BRUSH WASH: CPT | Performed by: INTERNAL MEDICINE

## 2021-05-03 PROCEDURE — 96367 TX/PROPH/DG ADDL SEQ IV INF: CPT

## 2021-05-03 PROCEDURE — 63600175 PHARM REV CODE 636 W HCPCS: Performed by: STUDENT IN AN ORGANIZED HEALTH CARE EDUCATION/TRAINING PROGRAM

## 2021-05-03 PROCEDURE — 94761 N-INVAS EAR/PLS OXIMETRY MLT: CPT

## 2021-05-03 PROCEDURE — 96376 TX/PRO/DX INJ SAME DRUG ADON: CPT

## 2021-05-03 PROCEDURE — 96365 THER/PROPH/DIAG IV INF INIT: CPT | Mod: 59

## 2021-05-03 PROCEDURE — 25000003 PHARM REV CODE 250: Performed by: STUDENT IN AN ORGANIZED HEALTH CARE EDUCATION/TRAINING PROGRAM

## 2021-05-03 RX ORDER — TALC
9 POWDER (GRAM) TOPICAL NIGHTLY PRN
Status: DISCONTINUED | OUTPATIENT
Start: 2021-05-03 | End: 2021-05-04 | Stop reason: HOSPADM

## 2021-05-03 RX ORDER — ACETAMINOPHEN 325 MG/1
650 TABLET ORAL EVERY 4 HOURS PRN
Status: DISCONTINUED | OUTPATIENT
Start: 2021-05-03 | End: 2021-05-04 | Stop reason: HOSPADM

## 2021-05-03 RX ORDER — GLUCAGON 1 MG
1 KIT INJECTION
Status: DISCONTINUED | OUTPATIENT
Start: 2021-05-03 | End: 2021-05-04 | Stop reason: HOSPADM

## 2021-05-03 RX ORDER — IBUPROFEN 200 MG
24 TABLET ORAL
Status: DISCONTINUED | OUTPATIENT
Start: 2021-05-03 | End: 2021-05-04 | Stop reason: HOSPADM

## 2021-05-03 RX ORDER — LORAZEPAM 2 MG/ML
1 INJECTION INTRAMUSCULAR EVERY 8 HOURS PRN
Status: DISCONTINUED | OUTPATIENT
Start: 2021-05-03 | End: 2021-05-04 | Stop reason: HOSPADM

## 2021-05-03 RX ORDER — IBUPROFEN 200 MG
16 TABLET ORAL
Status: DISCONTINUED | OUTPATIENT
Start: 2021-05-03 | End: 2021-05-04 | Stop reason: HOSPADM

## 2021-05-03 RX ORDER — PANTOPRAZOLE SODIUM 40 MG/1
40 TABLET, DELAYED RELEASE ORAL DAILY
Status: DISCONTINUED | OUTPATIENT
Start: 2021-05-04 | End: 2021-05-04 | Stop reason: HOSPADM

## 2021-05-03 RX ORDER — CHLORHEXIDINE GLUCONATE ORAL RINSE 1.2 MG/ML
15 SOLUTION DENTAL 2 TIMES DAILY
Status: DISCONTINUED | OUTPATIENT
Start: 2021-05-03 | End: 2021-05-04 | Stop reason: HOSPADM

## 2021-05-03 RX ORDER — MIDAZOLAM HYDROCHLORIDE 1 MG/ML
INJECTION INTRAMUSCULAR; INTRAVENOUS
Status: COMPLETED | OUTPATIENT
Start: 2021-05-03 | End: 2021-05-03

## 2021-05-03 RX ORDER — HYDROCODONE BITARTRATE AND ACETAMINOPHEN 5; 325 MG/1; MG/1
1 TABLET ORAL EVERY 6 HOURS PRN
Status: DISCONTINUED | OUTPATIENT
Start: 2021-05-03 | End: 2021-05-04 | Stop reason: HOSPADM

## 2021-05-03 RX ORDER — DIAZEPAM 10 MG/2ML
INJECTION INTRAMUSCULAR
Status: COMPLETED | OUTPATIENT
Start: 2021-05-03 | End: 2021-05-03

## 2021-05-03 RX ORDER — ONDANSETRON 4 MG/1
8 TABLET, ORALLY DISINTEGRATING ORAL EVERY 8 HOURS PRN
Status: DISCONTINUED | OUTPATIENT
Start: 2021-05-03 | End: 2021-05-04 | Stop reason: HOSPADM

## 2021-05-03 RX ORDER — DIAZEPAM 10 MG/2ML
INJECTION INTRAMUSCULAR
Status: DISPENSED
Start: 2021-05-03 | End: 2021-05-04

## 2021-05-03 RX ORDER — FENTANYL CITRATE 50 UG/ML
INJECTION, SOLUTION INTRAMUSCULAR; INTRAVENOUS
Status: DISCONTINUED
Start: 2021-05-03 | End: 2021-05-03 | Stop reason: WASHOUT

## 2021-05-03 RX ORDER — MUPIROCIN 20 MG/G
OINTMENT TOPICAL 2 TIMES DAILY
Status: DISCONTINUED | OUTPATIENT
Start: 2021-05-03 | End: 2021-05-04 | Stop reason: HOSPADM

## 2021-05-03 RX ORDER — LORAZEPAM 2 MG/ML
0.25 INJECTION INTRAMUSCULAR
Status: COMPLETED | OUTPATIENT
Start: 2021-05-03 | End: 2021-05-03

## 2021-05-03 RX ORDER — SODIUM CHLORIDE 0.9 % (FLUSH) 0.9 %
10 SYRINGE (ML) INJECTION EVERY 6 HOURS PRN
Status: DISCONTINUED | OUTPATIENT
Start: 2021-05-03 | End: 2021-05-04 | Stop reason: HOSPADM

## 2021-05-03 RX ORDER — MIDAZOLAM HYDROCHLORIDE 1 MG/ML
INJECTION INTRAMUSCULAR; INTRAVENOUS
Status: DISPENSED
Start: 2021-05-03 | End: 2021-05-04

## 2021-05-03 RX ORDER — PANTOPRAZOLE SODIUM 40 MG/10ML
40 INJECTION, POWDER, LYOPHILIZED, FOR SOLUTION INTRAVENOUS 2 TIMES DAILY
Status: DISCONTINUED | OUTPATIENT
Start: 2021-05-03 | End: 2021-05-03

## 2021-05-03 RX ORDER — CARVEDILOL 6.25 MG/1
6.25 TABLET ORAL 2 TIMES DAILY WITH MEALS
Status: ON HOLD | COMMUNITY
End: 2021-05-04 | Stop reason: HOSPADM

## 2021-05-03 RX ADMIN — MIDAZOLAM HYDROCHLORIDE 1 MG: 1 INJECTION, SOLUTION INTRAMUSCULAR; INTRAVENOUS at 05:05

## 2021-05-03 RX ADMIN — MIDAZOLAM HYDROCHLORIDE 2 MG: 1 INJECTION, SOLUTION INTRAMUSCULAR; INTRAVENOUS at 05:05

## 2021-05-03 RX ADMIN — PANTOPRAZOLE SODIUM 40 MG: 40 INJECTION, POWDER, FOR SOLUTION INTRAVENOUS at 02:05

## 2021-05-03 RX ADMIN — LORAZEPAM 1 MG: 2 INJECTION INTRAMUSCULAR; INTRAVENOUS at 07:05

## 2021-05-03 RX ADMIN — CEFTRIAXONE 1 G: 1 INJECTION, SOLUTION INTRAVENOUS at 01:05

## 2021-05-03 RX ADMIN — MUPIROCIN: 20 OINTMENT TOPICAL at 08:05

## 2021-05-03 RX ADMIN — LORAZEPAM 1 MG: 2 INJECTION INTRAMUSCULAR; INTRAVENOUS at 08:05

## 2021-05-03 RX ADMIN — MUPIROCIN: 20 OINTMENT TOPICAL at 09:05

## 2021-05-03 RX ADMIN — PANTOPRAZOLE SODIUM 40 MG: 40 INJECTION, POWDER, FOR SOLUTION INTRAVENOUS at 07:05

## 2021-05-03 RX ADMIN — CHLORHEXIDINE GLUCONATE 15 ML: 1.2 RINSE ORAL at 08:05

## 2021-05-03 RX ADMIN — PHYTONADIONE 10 MG: 10 INJECTION, EMULSION INTRAMUSCULAR; INTRAVENOUS; SUBCUTANEOUS at 02:05

## 2021-05-03 RX ADMIN — HYDROCODONE BITARTRATE AND ACETAMINOPHEN 1 TABLET: 5; 325 TABLET ORAL at 04:05

## 2021-05-03 RX ADMIN — CHLORHEXIDINE GLUCONATE 15 ML: 1.2 RINSE ORAL at 09:05

## 2021-05-03 RX ADMIN — PANTOPRAZOLE SODIUM 40 MG: 40 INJECTION, POWDER, FOR SOLUTION INTRAVENOUS at 12:05

## 2021-05-03 RX ADMIN — OCTREOTIDE ACETATE 50 MCG/HR: 500 INJECTION, SOLUTION INTRAVENOUS; SUBCUTANEOUS at 12:05

## 2021-05-03 RX ADMIN — OCTREOTIDE ACETATE 50 MCG/HR: 500 INJECTION, SOLUTION INTRAVENOUS; SUBCUTANEOUS at 03:05

## 2021-05-03 RX ADMIN — LORAZEPAM 0.25 MG: 2 INJECTION INTRAMUSCULAR; INTRAVENOUS at 02:05

## 2021-05-03 RX ADMIN — DIAZEPAM 5 MG: 5 INJECTION, SOLUTION INTRAMUSCULAR; INTRAVENOUS at 05:05

## 2021-05-04 VITALS
HEIGHT: 72 IN | BODY MASS INDEX: 25.41 KG/M2 | HEART RATE: 106 BPM | RESPIRATION RATE: 25 BRPM | WEIGHT: 187.63 LBS | SYSTOLIC BLOOD PRESSURE: 130 MMHG | OXYGEN SATURATION: 100 % | DIASTOLIC BLOOD PRESSURE: 75 MMHG | TEMPERATURE: 99 F

## 2021-05-04 LAB
ANION GAP SERPL CALC-SCNC: 10 MMOL/L (ref 8–16)
BUN SERPL-MCNC: 17 MG/DL (ref 6–20)
CALCIUM SERPL-MCNC: 8.5 MG/DL (ref 8.7–10.5)
CHLORIDE SERPL-SCNC: 103 MMOL/L (ref 95–110)
CO2 SERPL-SCNC: 23 MMOL/L (ref 23–29)
CREAT SERPL-MCNC: 1.9 MG/DL (ref 0.5–1.4)
ERYTHROCYTE [DISTWIDTH] IN BLOOD BY AUTOMATED COUNT: 15 % (ref 11.5–14.5)
EST. GFR  (AFRICAN AMERICAN): 47.8 ML/MIN/1.73 M^2
EST. GFR  (NON AFRICAN AMERICAN): 41.4 ML/MIN/1.73 M^2
GLUCOSE SERPL-MCNC: 132 MG/DL (ref 70–110)
HCT VFR BLD AUTO: 27.7 % (ref 40–54)
HGB BLD-MCNC: 9 G/DL (ref 14–18)
MAGNESIUM SERPL-MCNC: 1.7 MG/DL (ref 1.6–2.6)
MCH RBC QN AUTO: 34.2 PG (ref 27–31)
MCHC RBC AUTO-ENTMCNC: 32.5 G/DL (ref 32–36)
MCV RBC AUTO: 105 FL (ref 82–98)
PLATELET # BLD AUTO: 53 K/UL (ref 150–450)
PMV BLD AUTO: 11.2 FL (ref 9.2–12.9)
POTASSIUM SERPL-SCNC: 4.1 MMOL/L (ref 3.5–5.1)
RBC # BLD AUTO: 2.63 M/UL (ref 4.6–6.2)
SODIUM SERPL-SCNC: 136 MMOL/L (ref 136–145)
WBC # BLD AUTO: 5.77 K/UL (ref 3.9–12.7)

## 2021-05-04 PROCEDURE — 25000003 PHARM REV CODE 250: Performed by: HOSPITALIST

## 2021-05-04 PROCEDURE — 99900035 HC TECH TIME PER 15 MIN (STAT)

## 2021-05-04 PROCEDURE — 63600175 PHARM REV CODE 636 W HCPCS: Performed by: INTERNAL MEDICINE

## 2021-05-04 PROCEDURE — 94761 N-INVAS EAR/PLS OXIMETRY MLT: CPT

## 2021-05-04 PROCEDURE — 83735 ASSAY OF MAGNESIUM: CPT | Performed by: INTERNAL MEDICINE

## 2021-05-04 PROCEDURE — 25000003 PHARM REV CODE 250: Performed by: INTERNAL MEDICINE

## 2021-05-04 PROCEDURE — 80048 BASIC METABOLIC PNL TOTAL CA: CPT | Performed by: INTERNAL MEDICINE

## 2021-05-04 PROCEDURE — 36415 COLL VENOUS BLD VENIPUNCTURE: CPT | Performed by: INTERNAL MEDICINE

## 2021-05-04 PROCEDURE — 85027 COMPLETE CBC AUTOMATED: CPT | Performed by: INTERNAL MEDICINE

## 2021-05-04 RX ORDER — PANTOPRAZOLE SODIUM 40 MG/1
40 TABLET, DELAYED RELEASE ORAL 2 TIMES DAILY
Qty: 60 TABLET | Refills: 0 | Status: ON HOLD | OUTPATIENT
Start: 2021-05-04 | End: 2021-07-19

## 2021-05-04 RX ORDER — NADOLOL 20 MG/1
20 TABLET ORAL DAILY
Status: DISCONTINUED | OUTPATIENT
Start: 2021-05-04 | End: 2021-05-04 | Stop reason: HOSPADM

## 2021-05-04 RX ORDER — NADOLOL 20 MG/1
20 TABLET ORAL DAILY
Qty: 30 TABLET | Refills: 0 | Status: SHIPPED | OUTPATIENT
Start: 2021-05-04 | End: 2021-05-13

## 2021-05-04 RX ADMIN — NADOLOL 20 MG: 20 TABLET ORAL at 09:05

## 2021-05-04 RX ADMIN — MUPIROCIN: 20 OINTMENT TOPICAL at 08:05

## 2021-05-04 RX ADMIN — LORAZEPAM 1 MG: 2 INJECTION INTRAMUSCULAR; INTRAVENOUS at 03:05

## 2021-05-04 RX ADMIN — CHLORHEXIDINE GLUCONATE 15 ML: 1.2 RINSE ORAL at 08:05

## 2021-05-04 RX ADMIN — CEFTRIAXONE SODIUM 1 G: 1 INJECTION, POWDER, FOR SOLUTION INTRAMUSCULAR; INTRAVENOUS at 12:05

## 2021-05-04 RX ADMIN — PANTOPRAZOLE SODIUM 40 MG: 40 TABLET, DELAYED RELEASE ORAL at 06:05

## 2021-05-13 ENCOUNTER — HOSPITAL ENCOUNTER (OUTPATIENT)
Facility: HOSPITAL | Age: 46
Discharge: HOME | End: 2021-05-15
Attending: EMERGENCY MEDICINE | Admitting: INTERNAL MEDICINE
Payer: MEDICAID

## 2021-05-13 ENCOUNTER — PATIENT OUTREACH (OUTPATIENT)
Dept: EMERGENCY MEDICINE | Facility: HOSPITAL | Age: 46
End: 2021-05-13

## 2021-05-13 DIAGNOSIS — K70.30 ALCOHOLIC CIRRHOSIS OF LIVER WITHOUT ASCITES: ICD-10-CM

## 2021-05-13 DIAGNOSIS — R07.9 CHEST PAIN: ICD-10-CM

## 2021-05-13 DIAGNOSIS — R07.9 CHEST PAIN, UNSPECIFIED TYPE: ICD-10-CM

## 2021-05-13 DIAGNOSIS — N17.9 AKI (ACUTE KIDNEY INJURY): Primary | ICD-10-CM

## 2021-05-13 DIAGNOSIS — R79.89 ELEVATED TROPONIN: ICD-10-CM

## 2021-05-13 DIAGNOSIS — M25.552 CHRONIC LEFT HIP PAIN: ICD-10-CM

## 2021-05-13 DIAGNOSIS — I10 ESSENTIAL HYPERTENSION: ICD-10-CM

## 2021-05-13 DIAGNOSIS — G89.29 CHRONIC LEFT HIP PAIN: ICD-10-CM

## 2021-05-13 LAB
ALBUMIN SERPL BCP-MCNC: 3.4 G/DL (ref 3.5–5.2)
ALP SERPL-CCNC: 112 U/L (ref 55–135)
ALT SERPL W/O P-5'-P-CCNC: 38 U/L (ref 10–44)
ANION GAP SERPL CALC-SCNC: 16 MMOL/L (ref 8–16)
AST SERPL-CCNC: 90 U/L (ref 10–40)
BASOPHILS # BLD AUTO: 0.04 K/UL (ref 0–0.2)
BASOPHILS NFR BLD: 0.5 % (ref 0–1.9)
BILIRUB SERPL-MCNC: 6.4 MG/DL (ref 0.1–1)
BNP SERPL-MCNC: 330 PG/ML (ref 0–99)
BUN SERPL-MCNC: 17 MG/DL (ref 6–20)
CALCIUM SERPL-MCNC: 8.8 MG/DL (ref 8.7–10.5)
CHLORIDE SERPL-SCNC: 94 MMOL/L (ref 95–110)
CO2 SERPL-SCNC: 21 MMOL/L (ref 23–29)
CREAT SERPL-MCNC: 1.9 MG/DL (ref 0.5–1.4)
CREAT SERPL-MCNC: 2.2 MG/DL (ref 0.5–1.4)
DIFFERENTIAL METHOD: ABNORMAL
EOSINOPHIL # BLD AUTO: 0.1 K/UL (ref 0–0.5)
EOSINOPHIL NFR BLD: 1 % (ref 0–8)
ERYTHROCYTE [DISTWIDTH] IN BLOOD BY AUTOMATED COUNT: 15.4 % (ref 11.5–14.5)
EST. GFR  (AFRICAN AMERICAN): 47.8 ML/MIN/1.73 M^2
EST. GFR  (NON AFRICAN AMERICAN): 41.4 ML/MIN/1.73 M^2
ETHANOL SERPL-MCNC: <5 MG/DL
GLUCOSE SERPL-MCNC: 91 MG/DL (ref 70–110)
HCT VFR BLD AUTO: 26.7 % (ref 40–54)
HGB BLD-MCNC: 9.3 G/DL (ref 14–18)
IMM GRANULOCYTES # BLD AUTO: 0.03 K/UL (ref 0–0.04)
IMM GRANULOCYTES NFR BLD AUTO: 0.4 % (ref 0–0.5)
INR PPP: 1.4
LYMPHOCYTES # BLD AUTO: 1.8 K/UL (ref 1–4.8)
LYMPHOCYTES NFR BLD: 25.2 % (ref 18–48)
MAGNESIUM SERPL-MCNC: 1.6 MG/DL (ref 1.6–2.6)
MCH RBC QN AUTO: 34.4 PG (ref 27–31)
MCHC RBC AUTO-ENTMCNC: 34.8 G/DL (ref 32–36)
MCV RBC AUTO: 99 FL (ref 82–98)
MONOCYTES # BLD AUTO: 0.9 K/UL (ref 0.3–1)
MONOCYTES NFR BLD: 12.2 % (ref 4–15)
NEUTROPHILS # BLD AUTO: 4.4 K/UL (ref 1.8–7.7)
NEUTROPHILS NFR BLD: 60.7 % (ref 38–73)
NRBC BLD-RTO: 0 /100 WBC
PLATELET # BLD AUTO: 67 K/UL (ref 150–450)
PMV BLD AUTO: 11.2 FL (ref 9.2–12.9)
POTASSIUM SERPL-SCNC: 3.1 MMOL/L (ref 3.5–5.1)
PROT SERPL-MCNC: 8.4 G/DL (ref 6–8.4)
PROTHROMBIN TIME: 16.9 SEC (ref 11.8–14.3)
RBC # BLD AUTO: 2.7 M/UL (ref 4.6–6.2)
SAMPLE: ABNORMAL
SARS-COV-2 RDRP RESP QL NAA+PROBE: NEGATIVE
SODIUM SERPL-SCNC: 131 MMOL/L (ref 136–145)
TROPONIN I SERPL DL<=0.01 NG/ML-MCNC: 0.04 NG/ML
TROPONIN I SERPL DL<=0.01 NG/ML-MCNC: 0.04 NG/ML
WBC # BLD AUTO: 7.3 K/UL (ref 3.9–12.7)

## 2021-05-13 PROCEDURE — 84484 ASSAY OF TROPONIN QUANT: CPT | Performed by: EMERGENCY MEDICINE

## 2021-05-13 PROCEDURE — 93010 ELECTROCARDIOGRAM REPORT: CPT | Mod: ,,, | Performed by: SPECIALIST

## 2021-05-13 PROCEDURE — G0378 HOSPITAL OBSERVATION PER HR: HCPCS

## 2021-05-13 PROCEDURE — 36415 COLL VENOUS BLD VENIPUNCTURE: CPT | Performed by: INTERNAL MEDICINE

## 2021-05-13 PROCEDURE — 83735 ASSAY OF MAGNESIUM: CPT | Performed by: EMERGENCY MEDICINE

## 2021-05-13 PROCEDURE — 84484 ASSAY OF TROPONIN QUANT: CPT | Mod: 91 | Performed by: INTERNAL MEDICINE

## 2021-05-13 PROCEDURE — 99285 EMERGENCY DEPT VISIT HI MDM: CPT | Mod: 25

## 2021-05-13 PROCEDURE — 85610 PROTHROMBIN TIME: CPT | Performed by: EMERGENCY MEDICINE

## 2021-05-13 PROCEDURE — 83880 ASSAY OF NATRIURETIC PEPTIDE: CPT | Performed by: EMERGENCY MEDICINE

## 2021-05-13 PROCEDURE — 85025 COMPLETE CBC W/AUTO DIFF WBC: CPT | Performed by: EMERGENCY MEDICINE

## 2021-05-13 PROCEDURE — 93010 EKG 12-LEAD: ICD-10-PCS | Mod: ,,, | Performed by: SPECIALIST

## 2021-05-13 PROCEDURE — 93005 ELECTROCARDIOGRAM TRACING: CPT | Performed by: SPECIALIST

## 2021-05-13 PROCEDURE — U0002 COVID-19 LAB TEST NON-CDC: HCPCS | Performed by: EMERGENCY MEDICINE

## 2021-05-13 PROCEDURE — 63600175 PHARM REV CODE 636 W HCPCS: Performed by: INTERNAL MEDICINE

## 2021-05-13 PROCEDURE — 36415 COLL VENOUS BLD VENIPUNCTURE: CPT | Performed by: EMERGENCY MEDICINE

## 2021-05-13 PROCEDURE — 82077 ASSAY SPEC XCP UR&BREATH IA: CPT | Performed by: INTERNAL MEDICINE

## 2021-05-13 PROCEDURE — 25000003 PHARM REV CODE 250: Performed by: INTERNAL MEDICINE

## 2021-05-13 PROCEDURE — 96374 THER/PROPH/DIAG INJ IV PUSH: CPT

## 2021-05-13 PROCEDURE — 80053 COMPREHEN METABOLIC PANEL: CPT | Performed by: EMERGENCY MEDICINE

## 2021-05-13 RX ORDER — MAGNESIUM SULFATE HEPTAHYDRATE 40 MG/ML
2 INJECTION, SOLUTION INTRAVENOUS
Status: DISCONTINUED | OUTPATIENT
Start: 2021-05-13 | End: 2021-05-15 | Stop reason: HOSPADM

## 2021-05-13 RX ORDER — WARFARIN SODIUM 5 MG/1
TABLET ORAL
COMMUNITY
End: 2021-05-13

## 2021-05-13 RX ORDER — POTASSIUM CHLORIDE 20 MEQ/1
40 TABLET, EXTENDED RELEASE ORAL ONCE
Status: COMPLETED | OUTPATIENT
Start: 2021-05-13 | End: 2021-05-13

## 2021-05-13 RX ORDER — SODIUM CHLORIDE 0.9 % (FLUSH) 0.9 %
10 SYRINGE (ML) INJECTION
Status: DISCONTINUED | OUTPATIENT
Start: 2021-05-13 | End: 2021-05-15 | Stop reason: HOSPADM

## 2021-05-13 RX ORDER — KETOCONAZOLE 20 MG/G
1 CREAM TOPICAL DAILY
COMMUNITY
Start: 2021-02-22 | End: 2021-06-06

## 2021-05-13 RX ORDER — ONDANSETRON 4 MG/1
4 TABLET, FILM COATED ORAL DAILY PRN
COMMUNITY
Start: 2021-03-04 | End: 2021-06-06

## 2021-05-13 RX ORDER — ACETAMINOPHEN 325 MG/1
650 TABLET ORAL EVERY 8 HOURS PRN
Status: DISCONTINUED | OUTPATIENT
Start: 2021-05-13 | End: 2021-05-15 | Stop reason: HOSPADM

## 2021-05-13 RX ORDER — KETOCONAZOLE 20 MG/ML
1 SHAMPOO, SUSPENSION TOPICAL 3 TIMES DAILY
COMMUNITY
Start: 2021-02-22 | End: 2021-06-06

## 2021-05-13 RX ORDER — FUROSEMIDE 20 MG/1
TABLET ORAL
COMMUNITY
Start: 2020-10-27 | End: 2021-05-13

## 2021-05-13 RX ORDER — TRIAMCINOLONE ACETONIDE 1 MG/G
1 OINTMENT TOPICAL 2 TIMES DAILY
COMMUNITY
Start: 2021-02-22 | End: 2021-06-06

## 2021-05-13 RX ORDER — FERROUS GLUCONATE 324(38)MG
324 TABLET ORAL
COMMUNITY
Start: 2020-12-10 | End: 2021-05-13

## 2021-05-13 RX ORDER — POTASSIUM CHLORIDE 20 MEQ/1
TABLET, EXTENDED RELEASE ORAL
COMMUNITY
Start: 2020-10-27 | End: 2021-05-13

## 2021-05-13 RX ORDER — SUCRALFATE 1 G/1
1 TABLET ORAL
Status: DISCONTINUED | OUTPATIENT
Start: 2021-05-13 | End: 2021-05-15 | Stop reason: HOSPADM

## 2021-05-13 RX ORDER — POTASSIUM CHLORIDE 20 MEQ/1
20 TABLET, EXTENDED RELEASE ORAL
Status: DISCONTINUED | OUTPATIENT
Start: 2021-05-13 | End: 2021-05-15 | Stop reason: HOSPADM

## 2021-05-13 RX ORDER — POTASSIUM CHLORIDE 20 MEQ/1
40 TABLET, EXTENDED RELEASE ORAL
Status: DISCONTINUED | OUTPATIENT
Start: 2021-05-13 | End: 2021-05-15 | Stop reason: HOSPADM

## 2021-05-13 RX ORDER — POTASSIUM CHLORIDE 7.45 MG/ML
20 INJECTION INTRAVENOUS
Status: DISCONTINUED | OUTPATIENT
Start: 2021-05-13 | End: 2021-05-15 | Stop reason: HOSPADM

## 2021-05-13 RX ORDER — POTASSIUM CHLORIDE 7.45 MG/ML
40 INJECTION INTRAVENOUS
Status: DISCONTINUED | OUTPATIENT
Start: 2021-05-13 | End: 2021-05-15 | Stop reason: HOSPADM

## 2021-05-13 RX ORDER — CARVEDILOL 6.25 MG/1
6.25 TABLET ORAL 2 TIMES DAILY WITH MEALS
Status: ON HOLD | COMMUNITY
Start: 2020-10-12 | End: 2021-06-08 | Stop reason: HOSPADM

## 2021-05-13 RX ORDER — FAMOTIDINE 20 MG/1
20 TABLET, FILM COATED ORAL NIGHTLY PRN
Status: ON HOLD | COMMUNITY
End: 2021-05-15 | Stop reason: HOSPADM

## 2021-05-13 RX ORDER — CARVEDILOL 6.25 MG/1
6.25 TABLET ORAL 2 TIMES DAILY WITH MEALS
Status: DISCONTINUED | OUTPATIENT
Start: 2021-05-13 | End: 2021-05-15 | Stop reason: HOSPADM

## 2021-05-13 RX ORDER — SODIUM CHLORIDE, SODIUM LACTATE, POTASSIUM CHLORIDE, CALCIUM CHLORIDE 600; 310; 30; 20 MG/100ML; MG/100ML; MG/100ML; MG/100ML
INJECTION, SOLUTION INTRAVENOUS CONTINUOUS
Status: DISCONTINUED | OUTPATIENT
Start: 2021-05-13 | End: 2021-05-15 | Stop reason: HOSPADM

## 2021-05-13 RX ORDER — LANOLIN ALCOHOL/MO/W.PET/CERES
800 CREAM (GRAM) TOPICAL
Status: DISCONTINUED | OUTPATIENT
Start: 2021-05-13 | End: 2021-05-15 | Stop reason: HOSPADM

## 2021-05-13 RX ORDER — HYDROCHLOROTHIAZIDE 25 MG/1
TABLET ORAL
COMMUNITY
End: 2021-05-13

## 2021-05-13 RX ORDER — SUCRALFATE 1 G/1
1 TABLET ORAL
Status: ON HOLD | COMMUNITY
Start: 2020-09-10 | End: 2022-05-04 | Stop reason: HOSPADM

## 2021-05-13 RX ORDER — MUPIROCIN 20 MG/G
OINTMENT TOPICAL 3 TIMES DAILY
COMMUNITY
Start: 2021-02-22 | End: 2021-06-06

## 2021-05-13 RX ORDER — MAGNESIUM SULFATE HEPTAHYDRATE 40 MG/ML
4 INJECTION, SOLUTION INTRAVENOUS
Status: DISCONTINUED | OUTPATIENT
Start: 2021-05-13 | End: 2021-05-15 | Stop reason: HOSPADM

## 2021-05-13 RX ORDER — FERROUS SULFATE 325(65) MG
325 TABLET ORAL DAILY
Status: DISCONTINUED | OUTPATIENT
Start: 2021-05-13 | End: 2021-05-15 | Stop reason: HOSPADM

## 2021-05-13 RX ORDER — THIAMINE HCL 100 MG
100 TABLET ORAL DAILY
Status: DISCONTINUED | OUTPATIENT
Start: 2021-05-13 | End: 2021-05-15 | Stop reason: HOSPADM

## 2021-05-13 RX ORDER — HYDROCORTISONE 25 MG/G
1 CREAM TOPICAL DAILY
Status: ON HOLD | COMMUNITY
Start: 2021-02-22 | End: 2021-05-15 | Stop reason: HOSPADM

## 2021-05-13 RX ORDER — AMLODIPINE BESYLATE 10 MG/1
TABLET ORAL
COMMUNITY
End: 2021-05-13

## 2021-05-13 RX ORDER — ACETAMINOPHEN 325 MG/1
650 TABLET ORAL EVERY 4 HOURS PRN
Status: DISCONTINUED | OUTPATIENT
Start: 2021-05-13 | End: 2021-05-15 | Stop reason: HOSPADM

## 2021-05-13 RX ORDER — NIFEDIPINE 30 MG/1
60 TABLET, EXTENDED RELEASE ORAL DAILY
Status: DISCONTINUED | OUTPATIENT
Start: 2021-05-13 | End: 2021-05-15 | Stop reason: HOSPADM

## 2021-05-13 RX ORDER — FAMOTIDINE 20 MG/1
20 TABLET, FILM COATED ORAL DAILY
Status: DISCONTINUED | OUTPATIENT
Start: 2021-05-13 | End: 2021-05-13

## 2021-05-13 RX ORDER — HYDROCODONE BITARTRATE AND ACETAMINOPHEN 5; 325 MG/1; MG/1
1 TABLET ORAL EVERY 6 HOURS PRN
Status: DISCONTINUED | OUTPATIENT
Start: 2021-05-13 | End: 2021-05-15 | Stop reason: HOSPADM

## 2021-05-13 RX ORDER — FOLIC ACID 1 MG/1
1 TABLET ORAL DAILY
Status: DISCONTINUED | OUTPATIENT
Start: 2021-05-13 | End: 2021-05-15 | Stop reason: HOSPADM

## 2021-05-13 RX ORDER — PANTOPRAZOLE SODIUM 40 MG/1
40 TABLET, DELAYED RELEASE ORAL 2 TIMES DAILY
Status: DISCONTINUED | OUTPATIENT
Start: 2021-05-13 | End: 2021-05-15 | Stop reason: HOSPADM

## 2021-05-13 RX ORDER — ONDANSETRON 2 MG/ML
4 INJECTION INTRAMUSCULAR; INTRAVENOUS EVERY 8 HOURS PRN
Status: DISCONTINUED | OUTPATIENT
Start: 2021-05-13 | End: 2021-05-15 | Stop reason: HOSPADM

## 2021-05-13 RX ORDER — GABAPENTIN 300 MG/1
CAPSULE ORAL
COMMUNITY
Start: 2020-10-21 | End: 2021-05-13

## 2021-05-13 RX ORDER — MAGNESIUM SULFATE 1 G/100ML
1 INJECTION INTRAVENOUS
Status: DISCONTINUED | OUTPATIENT
Start: 2021-05-13 | End: 2021-05-15 | Stop reason: HOSPADM

## 2021-05-13 RX ORDER — PROPRANOLOL HYDROCHLORIDE 10 MG/1
TABLET ORAL
COMMUNITY
Start: 2020-10-12 | End: 2021-05-13

## 2021-05-13 RX ORDER — LISINOPRIL AND HYDROCHLOROTHIAZIDE 10; 12.5 MG/1; MG/1
TABLET ORAL
COMMUNITY
End: 2021-05-13

## 2021-05-13 RX ADMIN — NIFEDIPINE 60 MG: 30 TABLET, FILM COATED, EXTENDED RELEASE ORAL at 04:05

## 2021-05-13 RX ADMIN — PANTOPRAZOLE SODIUM 40 MG: 40 TABLET, DELAYED RELEASE ORAL at 08:05

## 2021-05-13 RX ADMIN — FERROUS SULFATE TAB 325 MG (65 MG ELEMENTAL FE) 325 MG: 325 (65 FE) TAB at 04:05

## 2021-05-13 RX ADMIN — SODIUM CHLORIDE, SODIUM LACTATE, POTASSIUM CHLORIDE, AND CALCIUM CHLORIDE: .6; .31; .03; .02 INJECTION, SOLUTION INTRAVENOUS at 09:05

## 2021-05-13 RX ADMIN — POTASSIUM CHLORIDE 40 MEQ: 20 TABLET, EXTENDED RELEASE ORAL at 04:05

## 2021-05-13 RX ADMIN — HYDROCODONE BITARTRATE AND ACETAMINOPHEN 1 TABLET: 5; 325 TABLET ORAL at 11:05

## 2021-05-13 RX ADMIN — MAGNESIUM OXIDE 800 MG: 400 TABLET ORAL at 06:05

## 2021-05-13 RX ADMIN — SUCRALFATE 1 G: 1 TABLET ORAL at 08:05

## 2021-05-13 RX ADMIN — HYDROCODONE BITARTRATE AND ACETAMINOPHEN 1 TABLET: 5; 325 TABLET ORAL at 04:05

## 2021-05-13 RX ADMIN — ONDANSETRON 4 MG: 2 INJECTION INTRAMUSCULAR; INTRAVENOUS at 05:05

## 2021-05-13 RX ADMIN — THIAMINE HCL TAB 100 MG 100 MG: 100 TAB at 04:05

## 2021-05-13 RX ADMIN — CARVEDILOL 6.25 MG: 6.25 TABLET, FILM COATED ORAL at 05:05

## 2021-05-13 RX ADMIN — MAGNESIUM OXIDE 800 MG: 400 TABLET ORAL at 11:05

## 2021-05-13 RX ADMIN — SUCRALFATE 1 G: 1 TABLET ORAL at 04:05

## 2021-05-13 RX ADMIN — FOLIC ACID 1 MG: 1 TABLET ORAL at 04:05

## 2021-05-14 PROBLEM — R07.9 CHEST PAIN: Status: RESOLVED | Noted: 2021-05-13 | Resolved: 2021-05-14

## 2021-05-14 LAB
ALBUMIN SERPL BCP-MCNC: 3 G/DL (ref 3.5–5.2)
ALP SERPL-CCNC: 99 U/L (ref 55–135)
ALT SERPL W/O P-5'-P-CCNC: 32 U/L (ref 10–44)
ANION GAP SERPL CALC-SCNC: 8 MMOL/L (ref 8–16)
AST SERPL-CCNC: 73 U/L (ref 10–40)
BASOPHILS # BLD AUTO: 0.02 K/UL (ref 0–0.2)
BASOPHILS NFR BLD: 0.4 % (ref 0–1.9)
BILIRUB SERPL-MCNC: 5.2 MG/DL (ref 0.1–1)
BUN SERPL-MCNC: 19 MG/DL (ref 6–20)
CALCIUM SERPL-MCNC: 8.3 MG/DL (ref 8.7–10.5)
CHLORIDE SERPL-SCNC: 98 MMOL/L (ref 95–110)
CO2 SERPL-SCNC: 26 MMOL/L (ref 23–29)
CREAT SERPL-MCNC: 1.6 MG/DL (ref 0.5–1.4)
DIFFERENTIAL METHOD: ABNORMAL
EOSINOPHIL # BLD AUTO: 0.1 K/UL (ref 0–0.5)
EOSINOPHIL NFR BLD: 1.4 % (ref 0–8)
ERYTHROCYTE [DISTWIDTH] IN BLOOD BY AUTOMATED COUNT: 15.2 % (ref 11.5–14.5)
EST. GFR  (AFRICAN AMERICAN): 58.9 ML/MIN/1.73 M^2
EST. GFR  (NON AFRICAN AMERICAN): 50.9 ML/MIN/1.73 M^2
GLUCOSE SERPL-MCNC: 139 MG/DL (ref 70–110)
HCT VFR BLD AUTO: 26.3 % (ref 40–54)
HGB BLD-MCNC: 8.8 G/DL (ref 14–18)
IMM GRANULOCYTES # BLD AUTO: 0.02 K/UL (ref 0–0.04)
IMM GRANULOCYTES NFR BLD AUTO: 0.4 % (ref 0–0.5)
LYMPHOCYTES # BLD AUTO: 1.2 K/UL (ref 1–4.8)
LYMPHOCYTES NFR BLD: 20.2 % (ref 18–48)
MAGNESIUM SERPL-MCNC: 1.8 MG/DL (ref 1.6–2.6)
MCH RBC QN AUTO: 34.4 PG (ref 27–31)
MCHC RBC AUTO-ENTMCNC: 33.5 G/DL (ref 32–36)
MCV RBC AUTO: 103 FL (ref 82–98)
MONOCYTES # BLD AUTO: 0.7 K/UL (ref 0.3–1)
MONOCYTES NFR BLD: 12.8 % (ref 4–15)
NEUTROPHILS # BLD AUTO: 3.7 K/UL (ref 1.8–7.7)
NEUTROPHILS NFR BLD: 64.8 % (ref 38–73)
NRBC BLD-RTO: 0 /100 WBC
PLATELET # BLD AUTO: 54 K/UL (ref 150–450)
PMV BLD AUTO: 12.1 FL (ref 9.2–12.9)
POTASSIUM SERPL-SCNC: 3.3 MMOL/L (ref 3.5–5.1)
PROT SERPL-MCNC: 7.6 G/DL (ref 6–8.4)
RBC # BLD AUTO: 2.56 M/UL (ref 4.6–6.2)
SODIUM SERPL-SCNC: 132 MMOL/L (ref 136–145)
WBC # BLD AUTO: 5.69 K/UL (ref 3.9–12.7)

## 2021-05-14 PROCEDURE — 36415 COLL VENOUS BLD VENIPUNCTURE: CPT | Performed by: INTERNAL MEDICINE

## 2021-05-14 PROCEDURE — 83735 ASSAY OF MAGNESIUM: CPT | Performed by: INTERNAL MEDICINE

## 2021-05-14 PROCEDURE — 80053 COMPREHEN METABOLIC PANEL: CPT | Performed by: INTERNAL MEDICINE

## 2021-05-14 PROCEDURE — 85025 COMPLETE CBC W/AUTO DIFF WBC: CPT | Performed by: INTERNAL MEDICINE

## 2021-05-14 PROCEDURE — 25000003 PHARM REV CODE 250: Performed by: INTERNAL MEDICINE

## 2021-05-14 PROCEDURE — G0378 HOSPITAL OBSERVATION PER HR: HCPCS

## 2021-05-14 PROCEDURE — 63600175 PHARM REV CODE 636 W HCPCS: Performed by: INTERNAL MEDICINE

## 2021-05-14 RX ADMIN — HYDROCODONE BITARTRATE AND ACETAMINOPHEN 1 TABLET: 5; 325 TABLET ORAL at 08:05

## 2021-05-14 RX ADMIN — PANTOPRAZOLE SODIUM 40 MG: 40 TABLET, DELAYED RELEASE ORAL at 08:05

## 2021-05-14 RX ADMIN — MAGNESIUM OXIDE 800 MG: 400 TABLET ORAL at 06:05

## 2021-05-14 RX ADMIN — HYDROCODONE BITARTRATE AND ACETAMINOPHEN 1 TABLET: 5; 325 TABLET ORAL at 04:05

## 2021-05-14 RX ADMIN — FOLIC ACID 1 MG: 1 TABLET ORAL at 08:05

## 2021-05-14 RX ADMIN — FERROUS SULFATE TAB 325 MG (65 MG ELEMENTAL FE) 325 MG: 325 (65 FE) TAB at 08:05

## 2021-05-14 RX ADMIN — SUCRALFATE 1 G: 1 TABLET ORAL at 04:05

## 2021-05-14 RX ADMIN — CARVEDILOL 6.25 MG: 6.25 TABLET, FILM COATED ORAL at 04:05

## 2021-05-14 RX ADMIN — POTASSIUM CHLORIDE 40 MEQ: 20 TABLET, EXTENDED RELEASE ORAL at 06:05

## 2021-05-14 RX ADMIN — SUCRALFATE 1 G: 1 TABLET ORAL at 06:05

## 2021-05-14 RX ADMIN — SUCRALFATE 1 G: 1 TABLET ORAL at 11:05

## 2021-05-14 RX ADMIN — SUCRALFATE 1 G: 1 TABLET ORAL at 08:05

## 2021-05-14 RX ADMIN — SODIUM CHLORIDE, SODIUM LACTATE, POTASSIUM CHLORIDE, AND CALCIUM CHLORIDE: .6; .31; .03; .02 INJECTION, SOLUTION INTRAVENOUS at 06:05

## 2021-05-14 RX ADMIN — THIAMINE HCL TAB 100 MG 100 MG: 100 TAB at 08:05

## 2021-05-14 RX ADMIN — NIFEDIPINE 60 MG: 30 TABLET, FILM COATED, EXTENDED RELEASE ORAL at 08:05

## 2021-05-14 RX ADMIN — CARVEDILOL 6.25 MG: 6.25 TABLET, FILM COATED ORAL at 08:05

## 2021-05-14 RX ADMIN — SODIUM CHLORIDE, SODIUM LACTATE, POTASSIUM CHLORIDE, AND CALCIUM CHLORIDE: .6; .31; .03; .02 INJECTION, SOLUTION INTRAVENOUS at 04:05

## 2021-05-15 VITALS
RESPIRATION RATE: 18 BRPM | WEIGHT: 183.44 LBS | BODY MASS INDEX: 24.85 KG/M2 | SYSTOLIC BLOOD PRESSURE: 109 MMHG | OXYGEN SATURATION: 100 % | HEART RATE: 89 BPM | HEIGHT: 72 IN | DIASTOLIC BLOOD PRESSURE: 60 MMHG | TEMPERATURE: 100 F

## 2021-05-15 PROBLEM — N17.9 AKI (ACUTE KIDNEY INJURY): Status: RESOLVED | Noted: 2020-01-09 | Resolved: 2021-05-15

## 2021-05-15 PROBLEM — E87.6 HYPOKALEMIA: Status: RESOLVED | Noted: 2019-05-11 | Resolved: 2021-05-15

## 2021-05-15 LAB
ALBUMIN SERPL BCP-MCNC: 3.1 G/DL (ref 3.5–5.2)
ALP SERPL-CCNC: 110 U/L (ref 55–135)
ALT SERPL W/O P-5'-P-CCNC: 32 U/L (ref 10–44)
ANION GAP SERPL CALC-SCNC: 11 MMOL/L (ref 8–16)
AST SERPL-CCNC: 75 U/L (ref 10–40)
BASOPHILS # BLD AUTO: 0.01 K/UL (ref 0–0.2)
BASOPHILS NFR BLD: 0.2 % (ref 0–1.9)
BILIRUB SERPL-MCNC: 4.3 MG/DL (ref 0.1–1)
BUN SERPL-MCNC: 17 MG/DL (ref 6–20)
CALCIUM SERPL-MCNC: 8.5 MG/DL (ref 8.7–10.5)
CHLORIDE SERPL-SCNC: 97 MMOL/L (ref 95–110)
CO2 SERPL-SCNC: 24 MMOL/L (ref 23–29)
CREAT SERPL-MCNC: 1.6 MG/DL (ref 0.5–1.4)
DIFFERENTIAL METHOD: ABNORMAL
EOSINOPHIL # BLD AUTO: 0.1 K/UL (ref 0–0.5)
EOSINOPHIL NFR BLD: 2 % (ref 0–8)
ERYTHROCYTE [DISTWIDTH] IN BLOOD BY AUTOMATED COUNT: 15.2 % (ref 11.5–14.5)
EST. GFR  (AFRICAN AMERICAN): 58.9 ML/MIN/1.73 M^2
EST. GFR  (NON AFRICAN AMERICAN): 50.9 ML/MIN/1.73 M^2
GLUCOSE SERPL-MCNC: 112 MG/DL (ref 70–110)
HCT VFR BLD AUTO: 28.3 % (ref 40–54)
HGB BLD-MCNC: 9.4 G/DL (ref 14–18)
IMM GRANULOCYTES # BLD AUTO: 0.01 K/UL (ref 0–0.04)
IMM GRANULOCYTES NFR BLD AUTO: 0.2 % (ref 0–0.5)
LYMPHOCYTES # BLD AUTO: 1.2 K/UL (ref 1–4.8)
LYMPHOCYTES NFR BLD: 21.2 % (ref 18–48)
MAGNESIUM SERPL-MCNC: 1.8 MG/DL (ref 1.6–2.6)
MCH RBC QN AUTO: 34.3 PG (ref 27–31)
MCHC RBC AUTO-ENTMCNC: 33.2 G/DL (ref 32–36)
MCV RBC AUTO: 103 FL (ref 82–98)
MONOCYTES # BLD AUTO: 0.5 K/UL (ref 0.3–1)
MONOCYTES NFR BLD: 9.1 % (ref 4–15)
NEUTROPHILS # BLD AUTO: 3.8 K/UL (ref 1.8–7.7)
NEUTROPHILS NFR BLD: 67.3 % (ref 38–73)
NRBC BLD-RTO: 0 /100 WBC
PLATELET # BLD AUTO: 60 K/UL (ref 150–450)
PMV BLD AUTO: 11.6 FL (ref 9.2–12.9)
POTASSIUM SERPL-SCNC: 3.7 MMOL/L (ref 3.5–5.1)
PROT SERPL-MCNC: 7.9 G/DL (ref 6–8.4)
RBC # BLD AUTO: 2.74 M/UL (ref 4.6–6.2)
SODIUM SERPL-SCNC: 132 MMOL/L (ref 136–145)
WBC # BLD AUTO: 5.61 K/UL (ref 3.9–12.7)

## 2021-05-15 PROCEDURE — 63600175 PHARM REV CODE 636 W HCPCS: Performed by: INTERNAL MEDICINE

## 2021-05-15 PROCEDURE — 83735 ASSAY OF MAGNESIUM: CPT | Performed by: INTERNAL MEDICINE

## 2021-05-15 PROCEDURE — 36415 COLL VENOUS BLD VENIPUNCTURE: CPT | Performed by: INTERNAL MEDICINE

## 2021-05-15 PROCEDURE — 80053 COMPREHEN METABOLIC PANEL: CPT | Performed by: INTERNAL MEDICINE

## 2021-05-15 PROCEDURE — 85025 COMPLETE CBC W/AUTO DIFF WBC: CPT | Performed by: INTERNAL MEDICINE

## 2021-05-15 PROCEDURE — 25000003 PHARM REV CODE 250: Performed by: INTERNAL MEDICINE

## 2021-05-15 PROCEDURE — G0378 HOSPITAL OBSERVATION PER HR: HCPCS

## 2021-05-15 RX ORDER — POTASSIUM CHLORIDE 20 MEQ/1
20 TABLET, EXTENDED RELEASE ORAL DAILY
Qty: 30 TABLET | Refills: 2 | Status: SHIPPED | OUTPATIENT
Start: 2021-05-15 | End: 2021-08-13

## 2021-05-15 RX ADMIN — FOLIC ACID 1 MG: 1 TABLET ORAL at 09:05

## 2021-05-15 RX ADMIN — HYDROCODONE BITARTRATE AND ACETAMINOPHEN 1 TABLET: 5; 325 TABLET ORAL at 05:05

## 2021-05-15 RX ADMIN — THIAMINE HCL TAB 100 MG 100 MG: 100 TAB at 09:05

## 2021-05-15 RX ADMIN — SUCRALFATE 1 G: 1 TABLET ORAL at 05:05

## 2021-05-15 RX ADMIN — CARVEDILOL 6.25 MG: 6.25 TABLET, FILM COATED ORAL at 09:05

## 2021-05-15 RX ADMIN — POTASSIUM CHLORIDE 40 MEQ: 20 TABLET, EXTENDED RELEASE ORAL at 06:05

## 2021-05-15 RX ADMIN — SUCRALFATE 1 G: 1 TABLET ORAL at 12:05

## 2021-05-15 RX ADMIN — FERROUS SULFATE TAB 325 MG (65 MG ELEMENTAL FE) 325 MG: 325 (65 FE) TAB at 09:05

## 2021-05-15 RX ADMIN — SODIUM CHLORIDE, SODIUM LACTATE, POTASSIUM CHLORIDE, AND CALCIUM CHLORIDE: .6; .31; .03; .02 INJECTION, SOLUTION INTRAVENOUS at 02:05

## 2021-05-15 RX ADMIN — PANTOPRAZOLE SODIUM 40 MG: 40 TABLET, DELAYED RELEASE ORAL at 09:05

## 2021-05-15 RX ADMIN — NIFEDIPINE 60 MG: 30 TABLET, FILM COATED, EXTENDED RELEASE ORAL at 09:05

## 2021-06-06 ENCOUNTER — HOSPITAL ENCOUNTER (OUTPATIENT)
Facility: HOSPITAL | Age: 46
Discharge: HOME | End: 2021-06-08
Attending: EMERGENCY MEDICINE | Admitting: INTERNAL MEDICINE
Payer: MEDICAID

## 2021-06-06 DIAGNOSIS — K74.60 DECOMPENSATED HEPATIC CIRRHOSIS: ICD-10-CM

## 2021-06-06 DIAGNOSIS — K72.90 DECOMPENSATED HEPATIC CIRRHOSIS: ICD-10-CM

## 2021-06-06 DIAGNOSIS — K70.31 ASCITES DUE TO ALCOHOLIC CIRRHOSIS: ICD-10-CM

## 2021-06-06 DIAGNOSIS — R17 ELEVATED BILIRUBIN: ICD-10-CM

## 2021-06-06 DIAGNOSIS — I50.9 CONGESTIVE HEART FAILURE, UNSPECIFIED HF CHRONICITY, UNSPECIFIED HEART FAILURE TYPE: Primary | ICD-10-CM

## 2021-06-06 DIAGNOSIS — E87.29 ALCOHOLIC KETOACIDOSIS: ICD-10-CM

## 2021-06-06 DIAGNOSIS — I50.33 ACUTE ON CHRONIC DIASTOLIC HEART FAILURE: ICD-10-CM

## 2021-06-06 PROBLEM — R79.89 ELEVATED TROPONIN LEVEL: Status: RESOLVED | Noted: 2020-04-08 | Resolved: 2021-06-06

## 2021-06-06 PROBLEM — A09 INFECTIOUS COLITIS: Status: RESOLVED | Noted: 2021-02-16 | Resolved: 2021-06-06

## 2021-06-06 PROBLEM — D62 ACUTE BLOOD LOSS ANEMIA: Status: RESOLVED | Noted: 2019-05-11 | Resolved: 2021-06-06

## 2021-06-06 PROBLEM — K92.0 HEMATEMESIS: Status: RESOLVED | Noted: 2021-05-03 | Resolved: 2021-06-06

## 2021-06-06 PROBLEM — I85.11 SECONDARY ESOPHAGEAL VARICES WITH BLEEDING: Status: RESOLVED | Noted: 2019-05-11 | Resolved: 2021-06-06

## 2021-06-06 LAB
ALBUMIN SERPL BCP-MCNC: 3.7 G/DL (ref 3.5–5.2)
ALLENS TEST: ABNORMAL
ALP SERPL-CCNC: 77 U/L (ref 55–135)
ALT SERPL W/O P-5'-P-CCNC: 44 U/L (ref 10–44)
AMMONIA PLAS-SCNC: 68 UMOL/L (ref 10–50)
AMPHET+METHAMPHET UR QL: NEGATIVE
ANION GAP SERPL CALC-SCNC: 19 MMOL/L (ref 8–16)
ANION GAP SERPL CALC-SCNC: 20 MMOL/L (ref 8–16)
AST SERPL-CCNC: 93 U/L (ref 10–40)
B-OH-BUTYR BLD STRIP-SCNC: 2.4 MMOL/L (ref 0–0.5)
BARBITURATES UR QL SCN>200 NG/ML: NEGATIVE
BASOPHILS NFR BLD: 0 % (ref 0–1.9)
BENZODIAZ UR QL SCN>200 NG/ML: NEGATIVE
BILIRUB SERPL-MCNC: 7.1 MG/DL (ref 0.1–1)
BILIRUB UR QL STRIP: NEGATIVE
BNP SERPL-MCNC: 647 PG/ML (ref 0–99)
BUN SERPL-MCNC: 19 MG/DL (ref 6–20)
BUN SERPL-MCNC: 19 MG/DL (ref 6–20)
BZE UR QL SCN: NEGATIVE
CALCIUM SERPL-MCNC: 9.1 MG/DL (ref 8.7–10.5)
CALCIUM SERPL-MCNC: 9.3 MG/DL (ref 8.7–10.5)
CANNABINOIDS UR QL SCN: NEGATIVE
CHLORIDE SERPL-SCNC: 93 MMOL/L (ref 95–110)
CHLORIDE SERPL-SCNC: 99 MMOL/L (ref 95–110)
CLARITY UR: CLEAR
CO2 SERPL-SCNC: 16 MMOL/L (ref 23–29)
CO2 SERPL-SCNC: 23 MMOL/L (ref 23–29)
COLOR UR: YELLOW
CREAT SERPL-MCNC: 1.7 MG/DL (ref 0.5–1.4)
CREAT SERPL-MCNC: 1.8 MG/DL (ref 0.5–1.4)
CREAT UR-MCNC: 51 MG/DL (ref 23–375)
DELSYS: ABNORMAL
DIFFERENTIAL METHOD: ABNORMAL
EOSINOPHIL NFR BLD: 1 % (ref 0–8)
ERYTHROCYTE [DISTWIDTH] IN BLOOD BY AUTOMATED COUNT: 17.2 % (ref 11.5–14.5)
EST. GFR  (AFRICAN AMERICAN): 51 ML/MIN/1.73 M^2
EST. GFR  (AFRICAN AMERICAN): 54.7 ML/MIN/1.73 M^2
EST. GFR  (NON AFRICAN AMERICAN): 44.2 ML/MIN/1.73 M^2
EST. GFR  (NON AFRICAN AMERICAN): 47.3 ML/MIN/1.73 M^2
ETHANOL SERPL-MCNC: 5 MG/DL
FLOW: 2
GLUCOSE SERPL-MCNC: 102 MG/DL (ref 70–110)
GLUCOSE SERPL-MCNC: 111 MG/DL (ref 70–110)
GLUCOSE SERPL-MCNC: 114 MG/DL (ref 70–110)
GLUCOSE SERPL-MCNC: 77 MG/DL (ref 70–110)
GLUCOSE SERPL-MCNC: 79 MG/DL (ref 70–110)
GLUCOSE UR QL STRIP: NEGATIVE
HCO3 UR-SCNC: 20.1 MMOL/L (ref 24–28)
HCT VFR BLD AUTO: 24.1 % (ref 40–54)
HGB BLD-MCNC: 8.3 G/DL (ref 14–18)
HGB UR QL STRIP: NEGATIVE
IMM GRANULOCYTES # BLD AUTO: ABNORMAL K/UL (ref 0–0.04)
IMM GRANULOCYTES NFR BLD AUTO: ABNORMAL % (ref 0–0.5)
INR PPP: 1.4
KETONES UR QL STRIP: ABNORMAL
LACTATE SERPL-SCNC: 4.1 MMOL/L (ref 0.5–1.9)
LACTATE SERPL-SCNC: 6 MMOL/L (ref 0.5–1.9)
LEUKOCYTE ESTERASE UR QL STRIP: NEGATIVE
LYMPHOCYTES NFR BLD: 26 % (ref 18–48)
MAGNESIUM SERPL-MCNC: 1.7 MG/DL (ref 1.6–2.6)
MCH RBC QN AUTO: 36.1 PG (ref 27–31)
MCHC RBC AUTO-ENTMCNC: 34.4 G/DL (ref 32–36)
MCV RBC AUTO: 105 FL (ref 82–98)
MODE: ABNORMAL
MONOCYTES NFR BLD: 8 % (ref 4–15)
NEUTROPHILS NFR BLD: 65 % (ref 38–73)
NITRITE UR QL STRIP: NEGATIVE
NRBC BLD-RTO: 0 /100 WBC
OPIATES UR QL SCN: NEGATIVE
PCO2 BLDA: 28.6 MMHG (ref 35–45)
PCP UR QL SCN>25 NG/ML: NEGATIVE
PH SMN: 7.46 [PH] (ref 7.35–7.45)
PH UR STRIP: 6 [PH] (ref 5–8)
PLATELET # BLD AUTO: 59 K/UL (ref 150–450)
PLATELET BLD QL SMEAR: ABNORMAL
PMV BLD AUTO: 11.3 FL (ref 9.2–12.9)
PO2 BLDA: 59 MMHG (ref 40–60)
POC BE: -4 MMOL/L
POC SATURATED O2: 92 % (ref 95–100)
POC TCO2: 21 MMOL/L (ref 24–29)
POTASSIUM SERPL-SCNC: 3.6 MMOL/L (ref 3.5–5.1)
POTASSIUM SERPL-SCNC: 4.3 MMOL/L (ref 3.5–5.1)
PROT SERPL-MCNC: 8.3 G/DL (ref 6–8.4)
PROT UR QL STRIP: NEGATIVE
PROTHROMBIN TIME: 16.6 SEC (ref 11.8–14.3)
RBC # BLD AUTO: 2.3 M/UL (ref 4.6–6.2)
SAMPLE: ABNORMAL
SARS-COV-2 RDRP RESP QL NAA+PROBE: NEGATIVE
SITE: ABNORMAL
SODIUM SERPL-SCNC: 135 MMOL/L (ref 136–145)
SODIUM SERPL-SCNC: 135 MMOL/L (ref 136–145)
SP GR UR STRIP: 1.01 (ref 1–1.03)
TOXICOLOGY INFORMATION: NORMAL
TROPONIN I SERPL DL<=0.01 NG/ML-MCNC: 0.03 NG/ML
URN SPEC COLLECT METH UR: ABNORMAL
UROBILINOGEN UR STRIP-ACNC: NEGATIVE EU/DL
WBC # BLD AUTO: 7.11 K/UL (ref 3.9–12.7)

## 2021-06-06 PROCEDURE — 63600175 PHARM REV CODE 636 W HCPCS: Performed by: EMERGENCY MEDICINE

## 2021-06-06 PROCEDURE — 80307 DRUG TEST PRSMV CHEM ANLYZR: CPT | Performed by: NURSE PRACTITIONER

## 2021-06-06 PROCEDURE — G0378 HOSPITAL OBSERVATION PER HR: HCPCS

## 2021-06-06 PROCEDURE — 83735 ASSAY OF MAGNESIUM: CPT | Performed by: EMERGENCY MEDICINE

## 2021-06-06 PROCEDURE — 83605 ASSAY OF LACTIC ACID: CPT | Performed by: NURSE PRACTITIONER

## 2021-06-06 PROCEDURE — 63600175 PHARM REV CODE 636 W HCPCS: Performed by: NURSE PRACTITIONER

## 2021-06-06 PROCEDURE — 96375 TX/PRO/DX INJ NEW DRUG ADDON: CPT

## 2021-06-06 PROCEDURE — 83880 ASSAY OF NATRIURETIC PEPTIDE: CPT | Performed by: EMERGENCY MEDICINE

## 2021-06-06 PROCEDURE — 80048 BASIC METABOLIC PNL TOTAL CA: CPT | Mod: XB | Performed by: NURSE PRACTITIONER

## 2021-06-06 PROCEDURE — 85025 COMPLETE CBC W/AUTO DIFF WBC: CPT | Performed by: EMERGENCY MEDICINE

## 2021-06-06 PROCEDURE — 82140 ASSAY OF AMMONIA: CPT | Performed by: NURSE PRACTITIONER

## 2021-06-06 PROCEDURE — U0002 COVID-19 LAB TEST NON-CDC: HCPCS | Performed by: EMERGENCY MEDICINE

## 2021-06-06 PROCEDURE — 82962 GLUCOSE BLOOD TEST: CPT

## 2021-06-06 PROCEDURE — 99900035 HC TECH TIME PER 15 MIN (STAT)

## 2021-06-06 PROCEDURE — 96374 THER/PROPH/DIAG INJ IV PUSH: CPT

## 2021-06-06 PROCEDURE — 93010 EKG 12-LEAD: ICD-10-PCS | Mod: ,,, | Performed by: SPECIALIST

## 2021-06-06 PROCEDURE — 99900031 HC PATIENT EDUCATION (STAT)

## 2021-06-06 PROCEDURE — 25000003 PHARM REV CODE 250: Performed by: NURSE PRACTITIONER

## 2021-06-06 PROCEDURE — 82077 ASSAY SPEC XCP UR&BREATH IA: CPT | Performed by: NURSE PRACTITIONER

## 2021-06-06 PROCEDURE — 99285 EMERGENCY DEPT VISIT HI MDM: CPT | Mod: 25

## 2021-06-06 PROCEDURE — 82803 BLOOD GASES ANY COMBINATION: CPT

## 2021-06-06 PROCEDURE — 93005 ELECTROCARDIOGRAM TRACING: CPT | Performed by: SPECIALIST

## 2021-06-06 PROCEDURE — 82010 KETONE BODYS QUAN: CPT | Performed by: NURSE PRACTITIONER

## 2021-06-06 PROCEDURE — 80053 COMPREHEN METABOLIC PANEL: CPT | Performed by: EMERGENCY MEDICINE

## 2021-06-06 PROCEDURE — 81003 URINALYSIS AUTO W/O SCOPE: CPT | Mod: 59 | Performed by: NURSE PRACTITIONER

## 2021-06-06 PROCEDURE — 93010 ELECTROCARDIOGRAM REPORT: CPT | Mod: ,,, | Performed by: SPECIALIST

## 2021-06-06 PROCEDURE — 36415 COLL VENOUS BLD VENIPUNCTURE: CPT | Performed by: EMERGENCY MEDICINE

## 2021-06-06 PROCEDURE — 85610 PROTHROMBIN TIME: CPT | Performed by: EMERGENCY MEDICINE

## 2021-06-06 PROCEDURE — 36415 COLL VENOUS BLD VENIPUNCTURE: CPT | Performed by: NURSE PRACTITIONER

## 2021-06-06 PROCEDURE — 84484 ASSAY OF TROPONIN QUANT: CPT | Performed by: EMERGENCY MEDICINE

## 2021-06-06 PROCEDURE — 96376 TX/PRO/DX INJ SAME DRUG ADON: CPT

## 2021-06-06 RX ORDER — CLOBETASOL PROPIONATE 0.46 MG/ML
SOLUTION TOPICAL
COMMUNITY
End: 2021-06-06

## 2021-06-06 RX ORDER — ERGOCALCIFEROL 1.25 MG/1
50000 CAPSULE ORAL
Status: ON HOLD | COMMUNITY
End: 2021-12-20

## 2021-06-06 RX ORDER — FUROSEMIDE 10 MG/ML
40 INJECTION INTRAMUSCULAR; INTRAVENOUS
Status: COMPLETED | OUTPATIENT
Start: 2021-06-06 | End: 2021-06-06

## 2021-06-06 RX ORDER — GLUCAGON 1 MG
1 KIT INJECTION
Status: DISCONTINUED | OUTPATIENT
Start: 2021-06-06 | End: 2021-06-08 | Stop reason: HOSPADM

## 2021-06-06 RX ORDER — FAMOTIDINE 20 MG/1
20 TABLET, FILM COATED ORAL DAILY
Status: DISCONTINUED | OUTPATIENT
Start: 2021-06-06 | End: 2021-06-08 | Stop reason: HOSPADM

## 2021-06-06 RX ORDER — LACTULOSE 10 G/15ML
20 SOLUTION ORAL 2 TIMES DAILY
Status: DISCONTINUED | OUTPATIENT
Start: 2021-06-06 | End: 2021-06-08 | Stop reason: HOSPADM

## 2021-06-06 RX ORDER — IBUPROFEN 200 MG
16 TABLET ORAL
Status: DISCONTINUED | OUTPATIENT
Start: 2021-06-06 | End: 2021-06-08 | Stop reason: HOSPADM

## 2021-06-06 RX ORDER — HYDROCORTISONE 25 MG/G
CREAM TOPICAL
COMMUNITY
End: 2021-06-06

## 2021-06-06 RX ORDER — ONDANSETRON 2 MG/ML
4 INJECTION INTRAMUSCULAR; INTRAVENOUS EVERY 8 HOURS PRN
Status: DISCONTINUED | OUTPATIENT
Start: 2021-06-06 | End: 2021-06-08 | Stop reason: HOSPADM

## 2021-06-06 RX ORDER — MAGNESIUM SULFATE HEPTAHYDRATE 40 MG/ML
2 INJECTION, SOLUTION INTRAVENOUS
Status: DISCONTINUED | OUTPATIENT
Start: 2021-06-06 | End: 2021-06-08 | Stop reason: HOSPADM

## 2021-06-06 RX ORDER — PREDNISOLONE 15 MG/5ML
SOLUTION ORAL
COMMUNITY
End: 2021-06-06

## 2021-06-06 RX ORDER — PIMECROLIMUS 10 MG/G
CREAM TOPICAL
COMMUNITY
End: 2021-06-06

## 2021-06-06 RX ORDER — GABAPENTIN 300 MG/1
300 CAPSULE ORAL DAILY
Status: DISCONTINUED | OUTPATIENT
Start: 2021-06-06 | End: 2021-06-06

## 2021-06-06 RX ORDER — SPIRONOLACTONE 50 MG/1
50 TABLET, FILM COATED ORAL DAILY
Status: DISCONTINUED | OUTPATIENT
Start: 2021-06-06 | End: 2021-06-08 | Stop reason: HOSPADM

## 2021-06-06 RX ORDER — POTASSIUM CHLORIDE 20 MEQ/1
20 TABLET, EXTENDED RELEASE ORAL
Status: DISCONTINUED | OUTPATIENT
Start: 2021-06-06 | End: 2021-06-08 | Stop reason: HOSPADM

## 2021-06-06 RX ORDER — POTASSIUM CHLORIDE 20 MEQ/1
40 TABLET, EXTENDED RELEASE ORAL
Status: DISCONTINUED | OUTPATIENT
Start: 2021-06-06 | End: 2021-06-08 | Stop reason: HOSPADM

## 2021-06-06 RX ORDER — MAGNESIUM SULFATE HEPTAHYDRATE 40 MG/ML
4 INJECTION, SOLUTION INTRAVENOUS
Status: DISCONTINUED | OUTPATIENT
Start: 2021-06-06 | End: 2021-06-08 | Stop reason: HOSPADM

## 2021-06-06 RX ORDER — MAGNESIUM SULFATE 1 G/100ML
1 INJECTION INTRAVENOUS
Status: DISCONTINUED | OUTPATIENT
Start: 2021-06-06 | End: 2021-06-08 | Stop reason: HOSPADM

## 2021-06-06 RX ORDER — LANOLIN ALCOHOL/MO/W.PET/CERES
400 CREAM (GRAM) TOPICAL ONCE
Status: COMPLETED | OUTPATIENT
Start: 2021-06-06 | End: 2021-06-06

## 2021-06-06 RX ORDER — SUCRALFATE 1 G/1
1 TABLET ORAL
Status: DISCONTINUED | OUTPATIENT
Start: 2021-06-06 | End: 2021-06-08 | Stop reason: HOSPADM

## 2021-06-06 RX ORDER — GABAPENTIN 300 MG/1
300 CAPSULE ORAL DAILY
Status: ON HOLD | COMMUNITY
End: 2022-03-08 | Stop reason: ALTCHOICE

## 2021-06-06 RX ORDER — POLYETHYLENE GLYCOL 3350 17 G/17G
17 POWDER, FOR SOLUTION ORAL 2 TIMES DAILY PRN
Status: DISCONTINUED | OUTPATIENT
Start: 2021-06-06 | End: 2021-06-08 | Stop reason: HOSPADM

## 2021-06-06 RX ORDER — PENTOXIFYLLINE 400 MG/1
400 TABLET, EXTENDED RELEASE ORAL 2 TIMES DAILY WITH MEALS
Status: DISCONTINUED | OUTPATIENT
Start: 2021-06-06 | End: 2021-06-08 | Stop reason: HOSPADM

## 2021-06-06 RX ORDER — FUROSEMIDE 20 MG/1
20 TABLET ORAL DAILY
Status: ON HOLD | COMMUNITY
End: 2021-06-08 | Stop reason: SDUPTHER

## 2021-06-06 RX ORDER — FERROUS SULFATE 325(65) MG
325 TABLET ORAL DAILY
Status: DISCONTINUED | OUTPATIENT
Start: 2021-06-06 | End: 2021-06-08 | Stop reason: HOSPADM

## 2021-06-06 RX ORDER — LISINOPRIL 5 MG/1
TABLET ORAL
COMMUNITY
Start: 2020-10-27 | End: 2021-06-06

## 2021-06-06 RX ORDER — OXYCODONE AND ACETAMINOPHEN 5; 325 MG/1; MG/1
TABLET ORAL
COMMUNITY
Start: 2020-09-03 | End: 2021-06-06

## 2021-06-06 RX ORDER — CEFEPIME HYDROCHLORIDE 1 G/50ML
1 INJECTION, SOLUTION INTRAVENOUS
Status: DISCONTINUED | OUTPATIENT
Start: 2021-06-06 | End: 2021-06-08

## 2021-06-06 RX ORDER — SULFAMETHOXAZOLE AND TRIMETHOPRIM 800; 160 MG/1; MG/1
TABLET ORAL
COMMUNITY
End: 2021-06-06

## 2021-06-06 RX ORDER — POTASSIUM CHLORIDE 7.45 MG/ML
20 INJECTION INTRAVENOUS
Status: DISCONTINUED | OUTPATIENT
Start: 2021-06-06 | End: 2021-06-08 | Stop reason: HOSPADM

## 2021-06-06 RX ORDER — CARVEDILOL 6.25 MG/1
6.25 TABLET ORAL 2 TIMES DAILY WITH MEALS
Status: DISCONTINUED | OUTPATIENT
Start: 2021-06-06 | End: 2021-06-06

## 2021-06-06 RX ORDER — THIAMINE HCL 100 MG
100 TABLET ORAL DAILY
Status: DISCONTINUED | OUTPATIENT
Start: 2021-06-07 | End: 2021-06-07

## 2021-06-06 RX ORDER — LABETALOL HYDROCHLORIDE 5 MG/ML
10 INJECTION, SOLUTION INTRAVENOUS
Status: DISCONTINUED | OUTPATIENT
Start: 2021-06-06 | End: 2021-06-06

## 2021-06-06 RX ORDER — POTASSIUM CHLORIDE 7.45 MG/ML
40 INJECTION INTRAVENOUS
Status: DISCONTINUED | OUTPATIENT
Start: 2021-06-06 | End: 2021-06-08 | Stop reason: HOSPADM

## 2021-06-06 RX ORDER — TALC
6 POWDER (GRAM) TOPICAL NIGHTLY PRN
Status: DISCONTINUED | OUTPATIENT
Start: 2021-06-06 | End: 2021-06-08 | Stop reason: HOSPADM

## 2021-06-06 RX ORDER — KETOROLAC TROMETHAMINE 10 MG/1
TABLET, FILM COATED ORAL
COMMUNITY
End: 2021-06-06

## 2021-06-06 RX ORDER — FOLIC ACID 1 MG/1
1 TABLET ORAL DAILY
Status: DISCONTINUED | OUTPATIENT
Start: 2021-06-06 | End: 2021-06-08 | Stop reason: HOSPADM

## 2021-06-06 RX ORDER — PROPRANOLOL HYDROCHLORIDE 10 MG/1
TABLET ORAL
COMMUNITY
End: 2021-06-06

## 2021-06-06 RX ORDER — LORAZEPAM 2 MG/ML
1 INJECTION INTRAMUSCULAR EVERY 4 HOURS PRN
Status: DISCONTINUED | OUTPATIENT
Start: 2021-06-06 | End: 2021-06-08 | Stop reason: HOSPADM

## 2021-06-06 RX ORDER — MAGNESIUM SULFATE 1 G/100ML
1 INJECTION INTRAVENOUS
Status: DISCONTINUED | OUTPATIENT
Start: 2021-06-06 | End: 2021-06-06

## 2021-06-06 RX ORDER — IBUPROFEN 200 MG
24 TABLET ORAL
Status: DISCONTINUED | OUTPATIENT
Start: 2021-06-06 | End: 2021-06-08 | Stop reason: HOSPADM

## 2021-06-06 RX ORDER — LEVOFLOXACIN 500 MG/1
TABLET, FILM COATED ORAL
COMMUNITY
End: 2021-06-06

## 2021-06-06 RX ORDER — PENTOXIFYLLINE 400 MG/1
400 TABLET, EXTENDED RELEASE ORAL 2 TIMES DAILY WITH MEALS
Status: ON HOLD | COMMUNITY
End: 2022-03-08 | Stop reason: ALTCHOICE

## 2021-06-06 RX ORDER — ONDANSETRON 4 MG/1
4 TABLET, ORALLY DISINTEGRATING ORAL EVERY 8 HOURS PRN
COMMUNITY
End: 2021-07-22

## 2021-06-06 RX ORDER — FERROUS GLUCONATE 324(38)MG
TABLET ORAL
COMMUNITY
End: 2021-06-06

## 2021-06-06 RX ORDER — FAMOTIDINE 20 MG/1
20 TABLET, FILM COATED ORAL DAILY PRN
Status: ON HOLD | COMMUNITY
End: 2022-05-04 | Stop reason: HOSPADM

## 2021-06-06 RX ORDER — POTASSIUM CHLORIDE 20 MEQ/1
20 TABLET, EXTENDED RELEASE ORAL ONCE
Status: COMPLETED | OUTPATIENT
Start: 2021-06-06 | End: 2021-06-06

## 2021-06-06 RX ORDER — CEFEPIME HYDROCHLORIDE 1 G/50ML
1 INJECTION, SOLUTION INTRAVENOUS
Status: DISCONTINUED | OUTPATIENT
Start: 2021-06-06 | End: 2021-06-06

## 2021-06-06 RX ORDER — POTASSIUM CHLORIDE 20 MEQ/1
20 TABLET, EXTENDED RELEASE ORAL DAILY
Status: DISCONTINUED | OUTPATIENT
Start: 2021-06-06 | End: 2021-06-06

## 2021-06-06 RX ORDER — DIAZEPAM 5 MG/1
5 TABLET ORAL EVERY 8 HOURS
Status: DISCONTINUED | OUTPATIENT
Start: 2021-06-06 | End: 2021-06-06

## 2021-06-06 RX ORDER — NIFEDIPINE 30 MG/1
90 TABLET, EXTENDED RELEASE ORAL DAILY
Status: DISCONTINUED | OUTPATIENT
Start: 2021-06-06 | End: 2021-06-08 | Stop reason: HOSPADM

## 2021-06-06 RX ORDER — PANTOPRAZOLE SODIUM 40 MG/1
40 TABLET, DELAYED RELEASE ORAL 2 TIMES DAILY
Status: DISCONTINUED | OUTPATIENT
Start: 2021-06-06 | End: 2021-06-08 | Stop reason: HOSPADM

## 2021-06-06 RX ORDER — LORAZEPAM 2 MG/ML
1 INJECTION INTRAMUSCULAR ONCE
Status: COMPLETED | OUTPATIENT
Start: 2021-06-06 | End: 2021-06-06

## 2021-06-06 RX ORDER — SODIUM CHLORIDE 0.9 % (FLUSH) 0.9 %
10 SYRINGE (ML) INJECTION
Status: DISCONTINUED | OUTPATIENT
Start: 2021-06-06 | End: 2021-06-08 | Stop reason: HOSPADM

## 2021-06-06 RX ORDER — MAGNESIUM SULFATE 1 G/100ML
1 INJECTION INTRAVENOUS
Status: ACTIVE | OUTPATIENT
Start: 2021-06-06 | End: 2021-06-06

## 2021-06-06 RX ORDER — FUROSEMIDE 20 MG/1
20 TABLET ORAL DAILY
Status: DISCONTINUED | OUTPATIENT
Start: 2021-06-07 | End: 2021-06-07

## 2021-06-06 RX ORDER — LANOLIN ALCOHOL/MO/W.PET/CERES
800 CREAM (GRAM) TOPICAL
Status: DISCONTINUED | OUTPATIENT
Start: 2021-06-06 | End: 2021-06-08 | Stop reason: HOSPADM

## 2021-06-06 RX ORDER — METRONIDAZOLE 500 MG/1
TABLET ORAL
COMMUNITY
Start: 2020-09-03 | End: 2021-06-06

## 2021-06-06 RX ADMIN — LORAZEPAM 1 MG: 2 INJECTION INTRAMUSCULAR; INTRAVENOUS at 11:06

## 2021-06-06 RX ADMIN — FUROSEMIDE 40 MG: 10 INJECTION, SOLUTION INTRAMUSCULAR; INTRAVENOUS at 11:06

## 2021-06-06 RX ADMIN — MAGNESIUM SULFATE IN DEXTROSE 1 G: 10 INJECTION, SOLUTION INTRAVENOUS at 06:06

## 2021-06-06 RX ADMIN — THIAMINE HYDROCHLORIDE 300 MG: 100 INJECTION, SOLUTION INTRAMUSCULAR; INTRAVENOUS at 02:06

## 2021-06-06 RX ADMIN — GABAPENTIN 300 MG: 300 CAPSULE ORAL at 04:06

## 2021-06-06 RX ADMIN — LORAZEPAM 1 MG: 2 INJECTION INTRAMUSCULAR; INTRAVENOUS at 02:06

## 2021-06-06 RX ADMIN — FOLIC ACID 1 MG: 1 TABLET ORAL at 04:06

## 2021-06-06 RX ADMIN — POTASSIUM CHLORIDE 20 MEQ: 20 TABLET, EXTENDED RELEASE ORAL at 04:06

## 2021-06-06 RX ADMIN — FAMOTIDINE 20 MG: 20 TABLET ORAL at 04:06

## 2021-06-06 RX ADMIN — PANTOPRAZOLE SODIUM 40 MG: 40 TABLET, DELAYED RELEASE ORAL at 06:06

## 2021-06-06 RX ADMIN — SPIRONOLACTONE 50 MG: 50 TABLET, FILM COATED ORAL at 04:06

## 2021-06-06 RX ADMIN — CEFEPIME HYDROCHLORIDE 1 G: 1 INJECTION, SOLUTION INTRAVENOUS at 09:06

## 2021-06-06 RX ADMIN — MAGNESIUM OXIDE 400 MG: 400 TABLET ORAL at 02:06

## 2021-06-06 RX ADMIN — DIAZEPAM 5 MG: 5 TABLET ORAL at 04:06

## 2021-06-06 RX ADMIN — PENTOXIFYLLINE 400 MG: 400 TABLET, EXTENDED RELEASE ORAL at 04:06

## 2021-06-06 RX ADMIN — SUCRALFATE 1 G: 1 TABLET ORAL at 04:06

## 2021-06-06 RX ADMIN — LACTULOSE 20 G: 20 SOLUTION ORAL at 04:06

## 2021-06-06 RX ADMIN — LORAZEPAM 1 MG: 2 INJECTION INTRAMUSCULAR; INTRAVENOUS at 06:06

## 2021-06-06 RX ADMIN — MAGNESIUM SULFATE HEPTAHYDRATE 1 G: 1 INJECTION, SOLUTION INTRAVENOUS at 09:06

## 2021-06-06 RX ADMIN — FERROUS SULFATE TAB 325 MG (65 MG ELEMENTAL FE) 325 MG: 325 (65 FE) TAB at 04:06

## 2021-06-06 RX ADMIN — NIFEDIPINE 90 MG: 30 TABLET, FILM COATED, EXTENDED RELEASE ORAL at 04:06

## 2021-06-06 RX ADMIN — POTASSIUM CHLORIDE 20 MEQ: 20 TABLET, EXTENDED RELEASE ORAL at 11:06

## 2021-06-06 RX ADMIN — SUCRALFATE 1 G: 1 TABLET ORAL at 11:06

## 2021-06-07 LAB
ALBUMIN SERPL BCP-MCNC: 3.5 G/DL (ref 3.5–5.2)
ALP SERPL-CCNC: 72 U/L (ref 55–135)
ALT SERPL W/O P-5'-P-CCNC: 43 U/L (ref 10–44)
AMMONIA PLAS-SCNC: 38 UMOL/L (ref 10–50)
ANION GAP SERPL CALC-SCNC: 14 MMOL/L (ref 8–16)
AST SERPL-CCNC: 83 U/L (ref 10–40)
BASOPHILS # BLD AUTO: 0.02 K/UL (ref 0–0.2)
BASOPHILS NFR BLD: 0.3 % (ref 0–1.9)
BILIRUB SERPL-MCNC: 7.4 MG/DL (ref 0.1–1)
BUN SERPL-MCNC: 19 MG/DL (ref 6–20)
CALCIUM SERPL-MCNC: 8.7 MG/DL (ref 8.7–10.5)
CHLORIDE SERPL-SCNC: 94 MMOL/L (ref 95–110)
CO2 SERPL-SCNC: 25 MMOL/L (ref 23–29)
CREAT SERPL-MCNC: 1.8 MG/DL (ref 0.5–1.4)
DIFFERENTIAL METHOD: ABNORMAL
EOSINOPHIL # BLD AUTO: 0 K/UL (ref 0–0.5)
EOSINOPHIL NFR BLD: 0.4 % (ref 0–8)
ERYTHROCYTE [DISTWIDTH] IN BLOOD BY AUTOMATED COUNT: 17 % (ref 11.5–14.5)
EST. GFR  (AFRICAN AMERICAN): 51 ML/MIN/1.73 M^2
EST. GFR  (NON AFRICAN AMERICAN): 44.2 ML/MIN/1.73 M^2
GLUCOSE SERPL-MCNC: 105 MG/DL (ref 70–110)
GLUCOSE SERPL-MCNC: 111 MG/DL (ref 70–110)
GLUCOSE SERPL-MCNC: 122 MG/DL (ref 70–110)
GLUCOSE SERPL-MCNC: 123 MG/DL (ref 70–110)
GLUCOSE SERPL-MCNC: 123 MG/DL (ref 70–110)
GLUCOSE SERPL-MCNC: 139 MG/DL (ref 70–110)
HCT VFR BLD AUTO: 25.7 % (ref 40–54)
HGB BLD-MCNC: 8.9 G/DL (ref 14–18)
IMM GRANULOCYTES # BLD AUTO: 0.03 K/UL (ref 0–0.04)
IMM GRANULOCYTES NFR BLD AUTO: 0.4 % (ref 0–0.5)
LACTATE SERPL-SCNC: 2 MMOL/L (ref 0.5–1.9)
LYMPHOCYTES # BLD AUTO: 0.8 K/UL (ref 1–4.8)
LYMPHOCYTES NFR BLD: 10.7 % (ref 18–48)
MAGNESIUM SERPL-MCNC: 2.2 MG/DL (ref 1.6–2.6)
MCH RBC QN AUTO: 36.3 PG (ref 27–31)
MCHC RBC AUTO-ENTMCNC: 34.6 G/DL (ref 32–36)
MCV RBC AUTO: 105 FL (ref 82–98)
MONOCYTES # BLD AUTO: 0.6 K/UL (ref 0.3–1)
MONOCYTES NFR BLD: 9 % (ref 4–15)
NEUTROPHILS # BLD AUTO: 5.6 K/UL (ref 1.8–7.7)
NEUTROPHILS NFR BLD: 79.2 % (ref 38–73)
NRBC BLD-RTO: 0 /100 WBC
PHOSPHATE SERPL-MCNC: 3.2 MG/DL (ref 2.7–4.5)
PLATELET # BLD AUTO: 60 K/UL (ref 150–450)
PMV BLD AUTO: 11.1 FL (ref 9.2–12.9)
POTASSIUM SERPL-SCNC: 3.7 MMOL/L (ref 3.5–5.1)
PROT SERPL-MCNC: 7.6 G/DL (ref 6–8.4)
RBC # BLD AUTO: 2.45 M/UL (ref 4.6–6.2)
SODIUM SERPL-SCNC: 133 MMOL/L (ref 136–145)
WBC # BLD AUTO: 7.08 K/UL (ref 3.9–12.7)

## 2021-06-07 PROCEDURE — 25000003 PHARM REV CODE 250: Performed by: NURSE PRACTITIONER

## 2021-06-07 PROCEDURE — 96376 TX/PRO/DX INJ SAME DRUG ADON: CPT

## 2021-06-07 PROCEDURE — 83735 ASSAY OF MAGNESIUM: CPT | Performed by: NURSE PRACTITIONER

## 2021-06-07 PROCEDURE — 63600175 PHARM REV CODE 636 W HCPCS: Performed by: STUDENT IN AN ORGANIZED HEALTH CARE EDUCATION/TRAINING PROGRAM

## 2021-06-07 PROCEDURE — 63600175 PHARM REV CODE 636 W HCPCS: Performed by: NURSE PRACTITIONER

## 2021-06-07 PROCEDURE — 80053 COMPREHEN METABOLIC PANEL: CPT | Performed by: NURSE PRACTITIONER

## 2021-06-07 PROCEDURE — 36415 COLL VENOUS BLD VENIPUNCTURE: CPT | Performed by: STUDENT IN AN ORGANIZED HEALTH CARE EDUCATION/TRAINING PROGRAM

## 2021-06-07 PROCEDURE — 85025 COMPLETE CBC W/AUTO DIFF WBC: CPT | Performed by: NURSE PRACTITIONER

## 2021-06-07 PROCEDURE — G0378 HOSPITAL OBSERVATION PER HR: HCPCS

## 2021-06-07 PROCEDURE — 25000003 PHARM REV CODE 250: Performed by: STUDENT IN AN ORGANIZED HEALTH CARE EDUCATION/TRAINING PROGRAM

## 2021-06-07 PROCEDURE — 36415 COLL VENOUS BLD VENIPUNCTURE: CPT | Performed by: NURSE PRACTITIONER

## 2021-06-07 PROCEDURE — 83605 ASSAY OF LACTIC ACID: CPT | Performed by: STUDENT IN AN ORGANIZED HEALTH CARE EDUCATION/TRAINING PROGRAM

## 2021-06-07 PROCEDURE — 82140 ASSAY OF AMMONIA: CPT | Performed by: NURSE PRACTITIONER

## 2021-06-07 PROCEDURE — 84100 ASSAY OF PHOSPHORUS: CPT | Performed by: NURSE PRACTITIONER

## 2021-06-07 RX ORDER — FUROSEMIDE 10 MG/ML
20 INJECTION INTRAMUSCULAR; INTRAVENOUS
Status: DISCONTINUED | OUTPATIENT
Start: 2021-06-07 | End: 2021-06-08

## 2021-06-07 RX ORDER — THIAMINE HCL 100 MG
100 TABLET ORAL 3 TIMES DAILY
Status: DISCONTINUED | OUTPATIENT
Start: 2021-06-07 | End: 2021-06-08 | Stop reason: HOSPADM

## 2021-06-07 RX ADMIN — LORAZEPAM 1 MG: 2 INJECTION INTRAMUSCULAR; INTRAVENOUS at 11:06

## 2021-06-07 RX ADMIN — FAMOTIDINE 20 MG: 20 TABLET ORAL at 11:06

## 2021-06-07 RX ADMIN — CEFEPIME HYDROCHLORIDE 1 G: 1 INJECTION, SOLUTION INTRAVENOUS at 11:06

## 2021-06-07 RX ADMIN — FUROSEMIDE 20 MG: 10 INJECTION, SOLUTION INTRAVENOUS at 11:06

## 2021-06-07 RX ADMIN — FUROSEMIDE 20 MG: 20 TABLET ORAL at 11:06

## 2021-06-07 RX ADMIN — LORAZEPAM 1 MG: 2 INJECTION INTRAMUSCULAR; INTRAVENOUS at 08:06

## 2021-06-07 RX ADMIN — PANTOPRAZOLE SODIUM 40 MG: 40 TABLET, DELAYED RELEASE ORAL at 06:06

## 2021-06-07 RX ADMIN — THERA TABS 1 TABLET: TAB at 03:06

## 2021-06-07 RX ADMIN — SUCRALFATE 1 G: 1 TABLET ORAL at 11:06

## 2021-06-07 RX ADMIN — FERROUS SULFATE TAB 325 MG (65 MG ELEMENTAL FE) 325 MG: 325 (65 FE) TAB at 11:06

## 2021-06-07 RX ADMIN — LACTULOSE 20 G: 20 SOLUTION ORAL at 11:06

## 2021-06-07 RX ADMIN — FOLIC ACID 1 MG: 1 TABLET ORAL at 11:06

## 2021-06-07 RX ADMIN — PENTOXIFYLLINE 400 MG: 400 TABLET, EXTENDED RELEASE ORAL at 05:06

## 2021-06-07 RX ADMIN — THIAMINE HCL TAB 100 MG 100 MG: 100 TAB at 11:06

## 2021-06-07 RX ADMIN — THIAMINE HCL TAB 100 MG 100 MG: 100 TAB at 08:06

## 2021-06-07 RX ADMIN — NIFEDIPINE 90 MG: 30 TABLET, FILM COATED, EXTENDED RELEASE ORAL at 11:06

## 2021-06-07 RX ADMIN — PENTOXIFYLLINE 400 MG: 400 TABLET, EXTENDED RELEASE ORAL at 11:06

## 2021-06-07 RX ADMIN — SPIRONOLACTONE 50 MG: 50 TABLET, FILM COATED ORAL at 11:06

## 2021-06-07 RX ADMIN — SUCRALFATE 1 G: 1 TABLET ORAL at 06:06

## 2021-06-07 RX ADMIN — SUCRALFATE 1 G: 1 TABLET ORAL at 05:06

## 2021-06-07 RX ADMIN — THIAMINE HCL TAB 100 MG 100 MG: 100 TAB at 03:06

## 2021-06-07 RX ADMIN — PANTOPRAZOLE SODIUM 40 MG: 40 TABLET, DELAYED RELEASE ORAL at 05:06

## 2021-06-07 RX ADMIN — CEFEPIME HYDROCHLORIDE 1 G: 1 INJECTION, SOLUTION INTRAVENOUS at 08:06

## 2021-06-07 RX ADMIN — LACTULOSE 20 G: 20 SOLUTION ORAL at 08:06

## 2021-06-08 VITALS
HEIGHT: 72 IN | DIASTOLIC BLOOD PRESSURE: 73 MMHG | HEART RATE: 98 BPM | RESPIRATION RATE: 20 BRPM | OXYGEN SATURATION: 99 % | TEMPERATURE: 99 F | SYSTOLIC BLOOD PRESSURE: 128 MMHG | WEIGHT: 184.5 LBS | BODY MASS INDEX: 24.99 KG/M2

## 2021-06-08 LAB
ALBUMIN SERPL BCP-MCNC: 3.2 G/DL (ref 3.5–5.2)
ALP SERPL-CCNC: 64 U/L (ref 55–135)
ALT SERPL W/O P-5'-P-CCNC: 36 U/L (ref 10–44)
ANION GAP SERPL CALC-SCNC: 13 MMOL/L (ref 8–16)
AST SERPL-CCNC: 75 U/L (ref 10–40)
BASOPHILS # BLD AUTO: 0.02 K/UL (ref 0–0.2)
BASOPHILS NFR BLD: 0.3 % (ref 0–1.9)
BILIRUB SERPL-MCNC: 6.3 MG/DL (ref 0.1–1)
BUN SERPL-MCNC: 16 MG/DL (ref 6–20)
CALCIUM SERPL-MCNC: 8.7 MG/DL (ref 8.7–10.5)
CHLORIDE SERPL-SCNC: 94 MMOL/L (ref 95–110)
CO2 SERPL-SCNC: 27 MMOL/L (ref 23–29)
CREAT SERPL-MCNC: 2.1 MG/DL (ref 0.5–1.4)
DIFFERENTIAL METHOD: ABNORMAL
EOSINOPHIL # BLD AUTO: 0.1 K/UL (ref 0–0.5)
EOSINOPHIL NFR BLD: 1.3 % (ref 0–8)
ERYTHROCYTE [DISTWIDTH] IN BLOOD BY AUTOMATED COUNT: 17.2 % (ref 11.5–14.5)
EST. GFR  (AFRICAN AMERICAN): 42.4 ML/MIN/1.73 M^2
EST. GFR  (NON AFRICAN AMERICAN): 36.6 ML/MIN/1.73 M^2
GLUCOSE SERPL-MCNC: 109 MG/DL (ref 70–110)
GLUCOSE SERPL-MCNC: 110 MG/DL (ref 70–110)
GLUCOSE SERPL-MCNC: 122 MG/DL (ref 70–110)
HCT VFR BLD AUTO: 25.7 % (ref 40–54)
HGB BLD-MCNC: 8.6 G/DL (ref 14–18)
IMM GRANULOCYTES # BLD AUTO: 0.05 K/UL (ref 0–0.04)
IMM GRANULOCYTES NFR BLD AUTO: 0.8 % (ref 0–0.5)
LYMPHOCYTES # BLD AUTO: 0.9 K/UL (ref 1–4.8)
LYMPHOCYTES NFR BLD: 15.5 % (ref 18–48)
MAGNESIUM SERPL-MCNC: 2 MG/DL (ref 1.6–2.6)
MCH RBC QN AUTO: 36 PG (ref 27–31)
MCHC RBC AUTO-ENTMCNC: 33.5 G/DL (ref 32–36)
MCV RBC AUTO: 108 FL (ref 82–98)
MONOCYTES # BLD AUTO: 0.8 K/UL (ref 0.3–1)
MONOCYTES NFR BLD: 13.2 % (ref 4–15)
NEUTROPHILS # BLD AUTO: 4.1 K/UL (ref 1.8–7.7)
NEUTROPHILS NFR BLD: 68.9 % (ref 38–73)
NRBC BLD-RTO: 0 /100 WBC
PHOSPHATE SERPL-MCNC: 2.9 MG/DL (ref 2.7–4.5)
PLATELET # BLD AUTO: 54 K/UL (ref 150–450)
PMV BLD AUTO: 11.1 FL (ref 9.2–12.9)
POTASSIUM SERPL-SCNC: 3.1 MMOL/L (ref 3.5–5.1)
PROT SERPL-MCNC: 7.5 G/DL (ref 6–8.4)
RBC # BLD AUTO: 2.39 M/UL (ref 4.6–6.2)
SODIUM SERPL-SCNC: 134 MMOL/L (ref 136–145)
WBC # BLD AUTO: 5.93 K/UL (ref 3.9–12.7)

## 2021-06-08 PROCEDURE — 25000003 PHARM REV CODE 250: Performed by: NURSE PRACTITIONER

## 2021-06-08 PROCEDURE — 83735 ASSAY OF MAGNESIUM: CPT | Performed by: NURSE PRACTITIONER

## 2021-06-08 PROCEDURE — 63600175 PHARM REV CODE 636 W HCPCS: Performed by: NURSE PRACTITIONER

## 2021-06-08 PROCEDURE — 84100 ASSAY OF PHOSPHORUS: CPT | Performed by: NURSE PRACTITIONER

## 2021-06-08 PROCEDURE — 25000003 PHARM REV CODE 250: Performed by: STUDENT IN AN ORGANIZED HEALTH CARE EDUCATION/TRAINING PROGRAM

## 2021-06-08 PROCEDURE — 96376 TX/PRO/DX INJ SAME DRUG ADON: CPT

## 2021-06-08 PROCEDURE — 80053 COMPREHEN METABOLIC PANEL: CPT | Performed by: NURSE PRACTITIONER

## 2021-06-08 PROCEDURE — 36415 COLL VENOUS BLD VENIPUNCTURE: CPT | Performed by: NURSE PRACTITIONER

## 2021-06-08 PROCEDURE — 85025 COMPLETE CBC W/AUTO DIFF WBC: CPT | Performed by: NURSE PRACTITIONER

## 2021-06-08 PROCEDURE — G0378 HOSPITAL OBSERVATION PER HR: HCPCS

## 2021-06-08 RX ORDER — LORAZEPAM 0.5 MG/1
TABLET ORAL
Status: ON HOLD
Start: 2021-06-08 | End: 2021-12-20

## 2021-06-08 RX ORDER — FUROSEMIDE 40 MG/1
20 TABLET ORAL DAILY
Qty: 30 TABLET | Refills: 1 | Status: ON HOLD | OUTPATIENT
Start: 2021-06-08 | End: 2022-03-12 | Stop reason: HOSPADM

## 2021-06-08 RX ORDER — SPIRONOLACTONE 50 MG/1
50 TABLET, FILM COATED ORAL DAILY
Qty: 30 TABLET | Refills: 11 | Status: ON HOLD | OUTPATIENT
Start: 2021-06-09 | End: 2022-03-12 | Stop reason: HOSPADM

## 2021-06-08 RX ORDER — FUROSEMIDE 20 MG/1
20 TABLET ORAL DAILY
Status: DISCONTINUED | OUTPATIENT
Start: 2021-06-09 | End: 2021-06-08 | Stop reason: HOSPADM

## 2021-06-08 RX ADMIN — NIFEDIPINE 90 MG: 30 TABLET, FILM COATED, EXTENDED RELEASE ORAL at 10:06

## 2021-06-08 RX ADMIN — FAMOTIDINE 20 MG: 20 TABLET ORAL at 10:06

## 2021-06-08 RX ADMIN — POTASSIUM CHLORIDE 40 MEQ: 20 TABLET, EXTENDED RELEASE ORAL at 10:06

## 2021-06-08 RX ADMIN — CEFEPIME HYDROCHLORIDE 1 G: 1 INJECTION, SOLUTION INTRAVENOUS at 10:06

## 2021-06-08 RX ADMIN — THERA TABS 1 TABLET: TAB at 10:06

## 2021-06-08 RX ADMIN — PANTOPRAZOLE SODIUM 40 MG: 40 TABLET, DELAYED RELEASE ORAL at 05:06

## 2021-06-08 RX ADMIN — FERROUS SULFATE TAB 325 MG (65 MG ELEMENTAL FE) 325 MG: 325 (65 FE) TAB at 10:06

## 2021-06-08 RX ADMIN — SPIRONOLACTONE 50 MG: 50 TABLET, FILM COATED ORAL at 10:06

## 2021-06-08 RX ADMIN — FOLIC ACID 1 MG: 1 TABLET ORAL at 10:06

## 2021-06-08 RX ADMIN — SUCRALFATE 1 G: 1 TABLET ORAL at 10:06

## 2021-06-08 RX ADMIN — LORAZEPAM 1 MG: 2 INJECTION INTRAMUSCULAR; INTRAVENOUS at 01:06

## 2021-06-08 RX ADMIN — SUCRALFATE 1 G: 1 TABLET ORAL at 05:06

## 2021-06-08 RX ADMIN — THIAMINE HCL TAB 100 MG 100 MG: 100 TAB at 10:06

## 2021-06-08 RX ADMIN — LORAZEPAM 1 MG: 2 INJECTION INTRAMUSCULAR; INTRAVENOUS at 10:06

## 2021-06-08 RX ADMIN — LACTULOSE 20 G: 20 SOLUTION ORAL at 10:06

## 2021-06-08 RX ADMIN — PENTOXIFYLLINE 400 MG: 400 TABLET, EXTENDED RELEASE ORAL at 10:06

## 2021-06-08 RX ADMIN — THIAMINE HCL TAB 100 MG 100 MG: 100 TAB at 02:06

## 2021-06-09 LAB — GLUCOSE SERPL-MCNC: 114 MG/DL (ref 70–110)

## 2021-07-16 PROBLEM — E80.6 HYPERBILIRUBINEMIA: Status: ACTIVE | Noted: 2021-07-16

## 2021-07-16 PROBLEM — I50.32 CHRONIC DIASTOLIC CHF (CONGESTIVE HEART FAILURE): Status: ACTIVE | Noted: 2021-06-06

## 2021-07-16 PROBLEM — A41.9 SEVERE SEPSIS WITHOUT SEPTIC SHOCK: Status: ACTIVE | Noted: 2021-07-16

## 2021-07-16 PROBLEM — E80.6 BILIRUBINEMIA: Status: ACTIVE | Noted: 2021-07-16

## 2021-07-16 PROBLEM — R65.20 SEVERE SEPSIS WITHOUT SEPTIC SHOCK: Status: ACTIVE | Noted: 2021-07-16

## 2021-07-21 ENCOUNTER — HOSPITAL ENCOUNTER (OUTPATIENT)
Facility: OTHER | Age: 46
Discharge: HOME | End: 2021-07-22
Attending: EMERGENCY MEDICINE | Admitting: EMERGENCY MEDICINE
Payer: MEDICAID

## 2021-07-21 DIAGNOSIS — Z87.19 HISTORY OF CIRRHOSIS OF LIVER: ICD-10-CM

## 2021-07-21 DIAGNOSIS — R74.8 ELEVATED LIPASE: ICD-10-CM

## 2021-07-21 DIAGNOSIS — K57.30 DIVERTICULA OF COLON: ICD-10-CM

## 2021-07-21 DIAGNOSIS — K83.8 DILATION OF COMMON BILE DUCT: Primary | ICD-10-CM

## 2021-07-21 DIAGNOSIS — R10.9 ABDOMINAL PAIN: ICD-10-CM

## 2021-07-21 DIAGNOSIS — R06.4 LABORED BREATHING: ICD-10-CM

## 2021-07-21 DIAGNOSIS — E80.6 HYPERBILIRUBINEMIA: ICD-10-CM

## 2021-07-21 LAB
ALBUMIN SERPL BCP-MCNC: 3.5 G/DL (ref 3.5–5.2)
ALP SERPL-CCNC: 98 U/L (ref 55–135)
ALT SERPL W/O P-5'-P-CCNC: 22 U/L (ref 10–44)
AMMONIA PLAS-SCNC: 47 UMOL/L (ref 10–50)
ANION GAP SERPL CALC-SCNC: 15 MMOL/L (ref 8–16)
ANISOCYTOSIS BLD QL SMEAR: SLIGHT
AST SERPL-CCNC: 42 U/L (ref 10–40)
BASOPHILS # BLD AUTO: 0.04 K/UL (ref 0–0.2)
BASOPHILS NFR BLD: 0.4 % (ref 0–1.9)
BILIRUB SERPL-MCNC: 4.8 MG/DL (ref 0.1–1)
BNP SERPL-MCNC: 618 PG/ML (ref 0–99)
BUN SERPL-MCNC: 20 MG/DL (ref 6–20)
CALCIUM SERPL-MCNC: 9.3 MG/DL (ref 8.7–10.5)
CHLORIDE SERPL-SCNC: 96 MMOL/L (ref 95–110)
CO2 SERPL-SCNC: 21 MMOL/L (ref 23–29)
CREAT SERPL-MCNC: 2 MG/DL (ref 0.5–1.4)
DIFFERENTIAL METHOD: ABNORMAL
EOSINOPHIL # BLD AUTO: 0.1 K/UL (ref 0–0.5)
EOSINOPHIL NFR BLD: 0.6 % (ref 0–8)
ERYTHROCYTE [DISTWIDTH] IN BLOOD BY AUTOMATED COUNT: 14.8 % (ref 11.5–14.5)
EST. GFR  (AFRICAN AMERICAN): 45 ML/MIN/1.73 M^2
EST. GFR  (NON AFRICAN AMERICAN): 39 ML/MIN/1.73 M^2
GLUCOSE SERPL-MCNC: 92 MG/DL (ref 70–110)
HCT VFR BLD AUTO: 29.4 % (ref 40–54)
HGB BLD-MCNC: 10.1 G/DL (ref 14–18)
IMM GRANULOCYTES # BLD AUTO: 0.06 K/UL (ref 0–0.04)
IMM GRANULOCYTES NFR BLD AUTO: 0.6 % (ref 0–0.5)
LACTATE SERPL-SCNC: 1.7 MMOL/L (ref 0.5–2.2)
LIPASE SERPL-CCNC: 760 U/L (ref 4–60)
LYMPHOCYTES # BLD AUTO: 1.9 K/UL (ref 1–4.8)
LYMPHOCYTES NFR BLD: 19.9 % (ref 18–48)
MCH RBC QN AUTO: 35.2 PG (ref 27–31)
MCHC RBC AUTO-ENTMCNC: 34.4 G/DL (ref 32–36)
MCV RBC AUTO: 102 FL (ref 82–98)
MONOCYTES # BLD AUTO: 0.9 K/UL (ref 0.3–1)
MONOCYTES NFR BLD: 9 % (ref 4–15)
NEUTROPHILS # BLD AUTO: 6.5 K/UL (ref 1.8–7.7)
NEUTROPHILS NFR BLD: 69.5 % (ref 38–73)
NRBC BLD-RTO: 0 /100 WBC
PLATELET # BLD AUTO: 105 K/UL (ref 150–450)
PLATELET BLD QL SMEAR: ABNORMAL
PMV BLD AUTO: 10.4 FL (ref 9.2–12.9)
POTASSIUM SERPL-SCNC: 4.3 MMOL/L (ref 3.5–5.1)
PROT SERPL-MCNC: 8.8 G/DL (ref 6–8.4)
RBC # BLD AUTO: 2.87 M/UL (ref 4.6–6.2)
SODIUM SERPL-SCNC: 132 MMOL/L (ref 136–145)
TROPONIN I SERPL DL<=0.01 NG/ML-MCNC: 0.03 NG/ML (ref 0–0.03)
TROPONIN I SERPL DL<=0.01 NG/ML-MCNC: 0.04 NG/ML (ref 0–0.03)
WBC # BLD AUTO: 9.41 K/UL (ref 3.9–12.7)

## 2021-07-21 PROCEDURE — 25000003 PHARM REV CODE 250: Performed by: PHYSICIAN ASSISTANT

## 2021-07-21 PROCEDURE — 93010 EKG 12-LEAD: ICD-10-PCS | Mod: ,,, | Performed by: INTERNAL MEDICINE

## 2021-07-21 PROCEDURE — 83690 ASSAY OF LIPASE: CPT | Performed by: PHYSICIAN ASSISTANT

## 2021-07-21 PROCEDURE — 93010 ELECTROCARDIOGRAM REPORT: CPT | Mod: ,,, | Performed by: INTERNAL MEDICINE

## 2021-07-21 PROCEDURE — 83605 ASSAY OF LACTIC ACID: CPT | Performed by: PHYSICIAN ASSISTANT

## 2021-07-21 PROCEDURE — 84484 ASSAY OF TROPONIN QUANT: CPT | Performed by: PHYSICIAN ASSISTANT

## 2021-07-21 PROCEDURE — 83880 ASSAY OF NATRIURETIC PEPTIDE: CPT | Performed by: PHYSICIAN ASSISTANT

## 2021-07-21 PROCEDURE — 96365 THER/PROPH/DIAG IV INF INIT: CPT

## 2021-07-21 PROCEDURE — 82140 ASSAY OF AMMONIA: CPT | Performed by: PHYSICIAN ASSISTANT

## 2021-07-21 PROCEDURE — G0378 HOSPITAL OBSERVATION PER HR: HCPCS

## 2021-07-21 PROCEDURE — 96375 TX/PRO/DX INJ NEW DRUG ADDON: CPT

## 2021-07-21 PROCEDURE — 63600175 PHARM REV CODE 636 W HCPCS: Performed by: PHYSICIAN ASSISTANT

## 2021-07-21 PROCEDURE — 84484 ASSAY OF TROPONIN QUANT: CPT | Mod: 91 | Performed by: PHYSICIAN ASSISTANT

## 2021-07-21 PROCEDURE — 99285 EMERGENCY DEPT VISIT HI MDM: CPT | Mod: 25

## 2021-07-21 PROCEDURE — 80053 COMPREHEN METABOLIC PANEL: CPT | Performed by: PHYSICIAN ASSISTANT

## 2021-07-21 PROCEDURE — 93005 ELECTROCARDIOGRAM TRACING: CPT

## 2021-07-21 PROCEDURE — 96361 HYDRATE IV INFUSION ADD-ON: CPT

## 2021-07-21 PROCEDURE — 96366 THER/PROPH/DIAG IV INF ADDON: CPT

## 2021-07-21 PROCEDURE — 85025 COMPLETE CBC W/AUTO DIFF WBC: CPT | Performed by: PHYSICIAN ASSISTANT

## 2021-07-21 RX ORDER — SODIUM CHLORIDE 0.9 % (FLUSH) 0.9 %
10 SYRINGE (ML) INJECTION
Status: DISCONTINUED | OUTPATIENT
Start: 2021-07-21 | End: 2021-07-22 | Stop reason: HOSPADM

## 2021-07-21 RX ORDER — CARVEDILOL 6.25 MG/1
6.25 TABLET ORAL 2 TIMES DAILY WITH MEALS
Status: ON HOLD | COMMUNITY
End: 2022-03-08 | Stop reason: ALTCHOICE

## 2021-07-21 RX ORDER — POTASSIUM CHLORIDE 20 MEQ/1
20 TABLET, EXTENDED RELEASE ORAL DAILY
Status: DISCONTINUED | OUTPATIENT
Start: 2021-07-22 | End: 2021-07-22 | Stop reason: HOSPADM

## 2021-07-21 RX ORDER — CARVEDILOL 6.25 MG/1
6.25 TABLET ORAL 2 TIMES DAILY WITH MEALS
Status: DISCONTINUED | OUTPATIENT
Start: 2021-07-21 | End: 2021-07-22 | Stop reason: HOSPADM

## 2021-07-21 RX ORDER — TALC
6 POWDER (GRAM) TOPICAL NIGHTLY PRN
Status: DISCONTINUED | OUTPATIENT
Start: 2021-07-21 | End: 2021-07-22 | Stop reason: HOSPADM

## 2021-07-21 RX ORDER — ACAMPROSATE CALCIUM 333 MG/1
666 TABLET, DELAYED RELEASE ORAL 3 TIMES DAILY
COMMUNITY
End: 2022-05-04

## 2021-07-21 RX ORDER — SPIRONOLACTONE 25 MG/1
50 TABLET ORAL DAILY
Status: DISCONTINUED | OUTPATIENT
Start: 2021-07-22 | End: 2021-07-22 | Stop reason: HOSPADM

## 2021-07-21 RX ORDER — FUROSEMIDE 20 MG/1
20 TABLET ORAL DAILY
Status: DISCONTINUED | OUTPATIENT
Start: 2021-07-22 | End: 2021-07-22 | Stop reason: HOSPADM

## 2021-07-21 RX ORDER — PANTOPRAZOLE SODIUM 40 MG/1
40 TABLET, DELAYED RELEASE ORAL 2 TIMES DAILY
Status: DISCONTINUED | OUTPATIENT
Start: 2021-07-21 | End: 2021-07-22 | Stop reason: HOSPADM

## 2021-07-21 RX ORDER — ONDANSETRON 2 MG/ML
4 INJECTION INTRAMUSCULAR; INTRAVENOUS
Status: COMPLETED | OUTPATIENT
Start: 2021-07-21 | End: 2021-07-21

## 2021-07-21 RX ORDER — NIFEDIPINE 30 MG/1
60 TABLET, EXTENDED RELEASE ORAL DAILY
Status: DISCONTINUED | OUTPATIENT
Start: 2021-07-22 | End: 2021-07-22 | Stop reason: HOSPADM

## 2021-07-21 RX ORDER — HYDROMORPHONE HYDROCHLORIDE 1 MG/ML
1 INJECTION, SOLUTION INTRAMUSCULAR; INTRAVENOUS; SUBCUTANEOUS EVERY 4 HOURS PRN
Status: DISCONTINUED | OUTPATIENT
Start: 2021-07-21 | End: 2021-07-22 | Stop reason: HOSPADM

## 2021-07-21 RX ORDER — SUCRALFATE 1 G/1
1 TABLET ORAL
Status: DISCONTINUED | OUTPATIENT
Start: 2021-07-21 | End: 2021-07-22 | Stop reason: HOSPADM

## 2021-07-21 RX ORDER — ONDANSETRON 2 MG/ML
4 INJECTION INTRAMUSCULAR; INTRAVENOUS EVERY 8 HOURS PRN
Status: DISCONTINUED | OUTPATIENT
Start: 2021-07-21 | End: 2021-07-22 | Stop reason: HOSPADM

## 2021-07-21 RX ORDER — HYDROMORPHONE HYDROCHLORIDE 1 MG/ML
0.5 INJECTION, SOLUTION INTRAMUSCULAR; INTRAVENOUS; SUBCUTANEOUS
Status: COMPLETED | OUTPATIENT
Start: 2021-07-21 | End: 2021-07-21

## 2021-07-21 RX ORDER — OXYCODONE HYDROCHLORIDE 5 MG/1
5 TABLET ORAL EVERY 4 HOURS PRN
Status: DISCONTINUED | OUTPATIENT
Start: 2021-07-21 | End: 2021-07-22 | Stop reason: HOSPADM

## 2021-07-21 RX ORDER — LANOLIN ALCOHOL/MO/W.PET/CERES
100 CREAM (GRAM) TOPICAL DAILY
Status: DISCONTINUED | OUTPATIENT
Start: 2021-07-22 | End: 2021-07-22 | Stop reason: HOSPADM

## 2021-07-21 RX ORDER — PENTOXIFYLLINE 400 MG/1
400 TABLET, EXTENDED RELEASE ORAL 2 TIMES DAILY WITH MEALS
Status: DISCONTINUED | OUTPATIENT
Start: 2021-07-21 | End: 2021-07-22 | Stop reason: HOSPADM

## 2021-07-21 RX ADMIN — CARVEDILOL 6.25 MG: 6.25 TABLET, FILM COATED ORAL at 09:07

## 2021-07-21 RX ADMIN — SUCRALFATE 1 G: 1 TABLET ORAL at 09:07

## 2021-07-21 RX ADMIN — HYDROMORPHONE HYDROCHLORIDE 0.5 MG: 1 INJECTION, SOLUTION INTRAMUSCULAR; INTRAVENOUS; SUBCUTANEOUS at 01:07

## 2021-07-21 RX ADMIN — PIPERACILLIN AND TAZOBACTAM 4.5 G: 4; .5 INJECTION, POWDER, LYOPHILIZED, FOR SOLUTION INTRAVENOUS; PARENTERAL at 06:07

## 2021-07-21 RX ADMIN — PANTOPRAZOLE SODIUM 40 MG: 40 TABLET, DELAYED RELEASE ORAL at 09:07

## 2021-07-21 RX ADMIN — SODIUM CHLORIDE 500 ML: 0.9 INJECTION, SOLUTION INTRAVENOUS at 01:07

## 2021-07-21 RX ADMIN — PENTOXIFYLLINE 400 MG: 400 TABLET, EXTENDED RELEASE ORAL at 09:07

## 2021-07-21 RX ADMIN — OXYCODONE 5 MG: 5 TABLET ORAL at 09:07

## 2021-07-21 RX ADMIN — ONDANSETRON 4 MG: 2 INJECTION INTRAMUSCULAR; INTRAVENOUS at 01:07

## 2021-07-22 VITALS
WEIGHT: 185 LBS | BODY MASS INDEX: 25.06 KG/M2 | TEMPERATURE: 98 F | HEART RATE: 72 BPM | HEIGHT: 72 IN | OXYGEN SATURATION: 99 % | DIASTOLIC BLOOD PRESSURE: 65 MMHG | RESPIRATION RATE: 17 BRPM | SYSTOLIC BLOOD PRESSURE: 108 MMHG

## 2021-07-22 LAB
ALBUMIN SERPL BCP-MCNC: 2.9 G/DL (ref 3.5–5.2)
ALP SERPL-CCNC: 83 U/L (ref 55–135)
ALT SERPL W/O P-5'-P-CCNC: 19 U/L (ref 10–44)
ANION GAP SERPL CALC-SCNC: 11 MMOL/L (ref 8–16)
ANISOCYTOSIS BLD QL SMEAR: SLIGHT
AST SERPL-CCNC: 40 U/L (ref 10–40)
BASOPHILS # BLD AUTO: 0.03 K/UL (ref 0–0.2)
BASOPHILS # BLD AUTO: ABNORMAL K/UL (ref 0–0.2)
BASOPHILS NFR BLD: 0.5 % (ref 0–1.9)
BASOPHILS NFR BLD: 1 % (ref 0–1.9)
BILIRUB SERPL-MCNC: 3.9 MG/DL (ref 0.1–1)
BUN SERPL-MCNC: 21 MG/DL (ref 6–20)
CALCIUM SERPL-MCNC: 8.8 MG/DL (ref 8.7–10.5)
CHLORIDE SERPL-SCNC: 100 MMOL/L (ref 95–110)
CO2 SERPL-SCNC: 22 MMOL/L (ref 23–29)
CREAT SERPL-MCNC: 2 MG/DL (ref 0.5–1.4)
DIFFERENTIAL METHOD: ABNORMAL
DIFFERENTIAL METHOD: ABNORMAL
EOSINOPHIL # BLD AUTO: 0.1 K/UL (ref 0–0.5)
EOSINOPHIL # BLD AUTO: ABNORMAL K/UL (ref 0–0.5)
EOSINOPHIL NFR BLD: 1 % (ref 0–8)
EOSINOPHIL NFR BLD: 3 % (ref 0–8)
ERYTHROCYTE [DISTWIDTH] IN BLOOD BY AUTOMATED COUNT: 14.7 % (ref 11.5–14.5)
ERYTHROCYTE [DISTWIDTH] IN BLOOD BY AUTOMATED COUNT: 14.7 % (ref 11.5–14.5)
EST. GFR  (AFRICAN AMERICAN): 45 ML/MIN/1.73 M^2
EST. GFR  (NON AFRICAN AMERICAN): 39 ML/MIN/1.73 M^2
GLUCOSE SERPL-MCNC: 122 MG/DL (ref 70–110)
HCT VFR BLD AUTO: 25.8 % (ref 40–54)
HCT VFR BLD AUTO: 26.9 % (ref 40–54)
HGB BLD-MCNC: 8.8 G/DL (ref 14–18)
HGB BLD-MCNC: 9.2 G/DL (ref 14–18)
IMM GRANULOCYTES # BLD AUTO: 0.02 K/UL (ref 0–0.04)
IMM GRANULOCYTES # BLD AUTO: ABNORMAL K/UL (ref 0–0.04)
IMM GRANULOCYTES NFR BLD AUTO: 0.3 % (ref 0–0.5)
IMM GRANULOCYTES NFR BLD AUTO: ABNORMAL % (ref 0–0.5)
LYMPHOCYTES # BLD AUTO: 1.6 K/UL (ref 1–4.8)
LYMPHOCYTES # BLD AUTO: ABNORMAL K/UL (ref 1–4.8)
LYMPHOCYTES NFR BLD: 23 % (ref 18–48)
LYMPHOCYTES NFR BLD: 26 % (ref 18–48)
MCH RBC QN AUTO: 34.8 PG (ref 27–31)
MCH RBC QN AUTO: 34.9 PG (ref 27–31)
MCHC RBC AUTO-ENTMCNC: 34.1 G/DL (ref 32–36)
MCHC RBC AUTO-ENTMCNC: 34.2 G/DL (ref 32–36)
MCV RBC AUTO: 102 FL (ref 82–98)
MCV RBC AUTO: 102 FL (ref 82–98)
MONOCYTES # BLD AUTO: 0.8 K/UL (ref 0.3–1)
MONOCYTES # BLD AUTO: ABNORMAL K/UL (ref 0.3–1)
MONOCYTES NFR BLD: 10 % (ref 4–15)
MONOCYTES NFR BLD: 12.5 % (ref 4–15)
NEUTROPHILS # BLD AUTO: 3.6 K/UL (ref 1.8–7.7)
NEUTROPHILS NFR BLD: 59.7 % (ref 38–73)
NEUTROPHILS NFR BLD: 63 % (ref 38–73)
NRBC BLD-RTO: 0 /100 WBC
NRBC BLD-RTO: 0 /100 WBC
PLATELET # BLD AUTO: 83 K/UL (ref 150–450)
PLATELET # BLD AUTO: 93 K/UL (ref 150–450)
PLATELET BLD QL SMEAR: ABNORMAL
PMV BLD AUTO: 10.4 FL (ref 9.2–12.9)
PMV BLD AUTO: 10.7 FL (ref 9.2–12.9)
POTASSIUM SERPL-SCNC: 4.4 MMOL/L (ref 3.5–5.1)
PROT SERPL-MCNC: 7.7 G/DL (ref 6–8.4)
RBC # BLD AUTO: 2.52 M/UL (ref 4.6–6.2)
RBC # BLD AUTO: 2.64 M/UL (ref 4.6–6.2)
SODIUM SERPL-SCNC: 133 MMOL/L (ref 136–145)
TROPONIN I SERPL DL<=0.01 NG/ML-MCNC: 0.04 NG/ML (ref 0–0.03)
WBC # BLD AUTO: 5.93 K/UL (ref 3.9–12.7)
WBC # BLD AUTO: 6.08 K/UL (ref 3.9–12.7)

## 2021-07-22 PROCEDURE — 85007 BL SMEAR W/DIFF WBC COUNT: CPT | Mod: NCS | Performed by: PHYSICIAN ASSISTANT

## 2021-07-22 PROCEDURE — 25000003 PHARM REV CODE 250: Performed by: PHYSICIAN ASSISTANT

## 2021-07-22 PROCEDURE — G0378 HOSPITAL OBSERVATION PER HR: HCPCS

## 2021-07-22 PROCEDURE — 85027 COMPLETE CBC AUTOMATED: CPT | Mod: 59 | Performed by: PHYSICIAN ASSISTANT

## 2021-07-22 PROCEDURE — 84484 ASSAY OF TROPONIN QUANT: CPT | Performed by: PHYSICIAN ASSISTANT

## 2021-07-22 PROCEDURE — 85025 COMPLETE CBC W/AUTO DIFF WBC: CPT | Performed by: EMERGENCY MEDICINE

## 2021-07-22 PROCEDURE — 80053 COMPREHEN METABOLIC PANEL: CPT | Performed by: PHYSICIAN ASSISTANT

## 2021-07-22 RX ORDER — TRAMADOL HYDROCHLORIDE 50 MG/1
50 TABLET ORAL EVERY 6 HOURS PRN
Qty: 8 TABLET | Refills: 0 | Status: SHIPPED | OUTPATIENT
Start: 2021-07-22 | End: 2021-07-24

## 2021-07-22 RX ORDER — AMOXICILLIN AND CLAVULANATE POTASSIUM 875; 125 MG/1; MG/1
1 TABLET, FILM COATED ORAL 2 TIMES DAILY
Qty: 14 TABLET | Refills: 0 | Status: ON HOLD | OUTPATIENT
Start: 2021-07-22 | End: 2021-12-20

## 2021-07-22 RX ORDER — ONDANSETRON 4 MG/1
4 TABLET, ORALLY DISINTEGRATING ORAL EVERY 8 HOURS PRN
Qty: 20 TABLET | Refills: 0 | Status: ON HOLD | OUTPATIENT
Start: 2021-07-22 | End: 2022-05-04 | Stop reason: HOSPADM

## 2021-07-22 RX ADMIN — SUCRALFATE 1 G: 1 TABLET ORAL at 09:07

## 2021-07-22 RX ADMIN — NIFEDIPINE 60 MG: 30 TABLET, FILM COATED, EXTENDED RELEASE ORAL at 09:07

## 2021-07-22 RX ADMIN — Medication 100 MG: at 09:07

## 2021-07-22 RX ADMIN — OXYCODONE 5 MG: 5 TABLET ORAL at 11:07

## 2021-07-22 RX ADMIN — PANTOPRAZOLE SODIUM 40 MG: 40 TABLET, DELAYED RELEASE ORAL at 11:07

## 2021-07-22 RX ADMIN — SPIRONOLACTONE 50 MG: 25 TABLET ORAL at 09:07

## 2021-07-22 RX ADMIN — FUROSEMIDE 20 MG: 20 TABLET ORAL at 09:07

## 2021-07-22 RX ADMIN — POTASSIUM CHLORIDE 20 MEQ: 1500 TABLET, EXTENDED RELEASE ORAL at 09:07

## 2021-07-22 RX ADMIN — PENTOXIFYLLINE 400 MG: 400 TABLET, EXTENDED RELEASE ORAL at 09:07

## 2021-07-22 RX ADMIN — SUCRALFATE 1 G: 1 TABLET ORAL at 11:07

## 2021-07-22 RX ADMIN — OXYCODONE 5 MG: 5 TABLET ORAL at 05:07

## 2021-07-23 ENCOUNTER — TELEPHONE (OUTPATIENT)
Dept: ADMINISTRATIVE | Facility: OTHER | Age: 46
End: 2021-07-23

## 2021-08-12 ENCOUNTER — TELEPHONE (OUTPATIENT)
Dept: GASTROENTEROLOGY | Facility: CLINIC | Age: 46
End: 2021-08-12

## 2021-08-28 NOTE — NURSING
Patient given discharge instructions. Patient verbalized understanding of follow up appointment and medication changes. PIV removed without difficulty, catheter tip intact. Tele removed. Patient to discharge home via private vehicle.   
98.6

## 2021-12-02 ENCOUNTER — DOCUMENTATION ONLY (OUTPATIENT)
Dept: SURGERY | Facility: CLINIC | Age: 46
End: 2021-12-02
Payer: MEDICARE

## 2021-12-02 ENCOUNTER — OFFICE VISIT (OUTPATIENT)
Dept: GASTROENTEROLOGY | Facility: CLINIC | Age: 46
End: 2021-12-02
Payer: MEDICARE

## 2021-12-02 VITALS
WEIGHT: 184.88 LBS | DIASTOLIC BLOOD PRESSURE: 89 MMHG | SYSTOLIC BLOOD PRESSURE: 154 MMHG | HEART RATE: 92 BPM | BODY MASS INDEX: 25.07 KG/M2

## 2021-12-02 DIAGNOSIS — R10.9 ABDOMINAL PAIN: ICD-10-CM

## 2021-12-02 DIAGNOSIS — E80.6 HYPERBILIRUBINEMIA: ICD-10-CM

## 2021-12-02 DIAGNOSIS — K70.30 ALCOHOLIC CIRRHOSIS, UNSPECIFIED WHETHER ASCITES PRESENT: Primary | ICD-10-CM

## 2021-12-02 DIAGNOSIS — K25.9 GASTRIC ULCER, UNSPECIFIED CHRONICITY, UNSPECIFIED WHETHER GASTRIC ULCER HEMORRHAGE OR PERFORATION PRESENT: ICD-10-CM

## 2021-12-02 DIAGNOSIS — K83.8 DILATION OF COMMON BILE DUCT: ICD-10-CM

## 2021-12-02 DIAGNOSIS — R10.9 ABDOMINAL PAIN, UNSPECIFIED ABDOMINAL LOCATION: Primary | ICD-10-CM

## 2021-12-02 PROCEDURE — 99214 OFFICE O/P EST MOD 30 MIN: CPT | Mod: S$GLB,,, | Performed by: INTERNAL MEDICINE

## 2021-12-02 PROCEDURE — 99999 PR PBB SHADOW E&M-EST. PATIENT-LVL V: ICD-10-PCS | Mod: PBBFAC,,, | Performed by: INTERNAL MEDICINE

## 2021-12-02 PROCEDURE — 99214 PR OFFICE/OUTPT VISIT, EST, LEVL IV, 30-39 MIN: ICD-10-PCS | Mod: S$GLB,,, | Performed by: INTERNAL MEDICINE

## 2021-12-02 PROCEDURE — 99999 PR PBB SHADOW E&M-EST. PATIENT-LVL V: CPT | Mod: PBBFAC,,, | Performed by: INTERNAL MEDICINE

## 2021-12-02 PROCEDURE — 99215 OFFICE O/P EST HI 40 MIN: CPT | Mod: PBBFAC,PN | Performed by: INTERNAL MEDICINE

## 2022-02-15 ENCOUNTER — TELEPHONE (OUTPATIENT)
Dept: GASTROENTEROLOGY | Facility: CLINIC | Age: 47
End: 2022-02-15
Payer: MEDICARE

## 2022-02-15 NOTE — TELEPHONE ENCOUNTER
Patient is calling to schedule a consult with Dr. Medeiros. Can you please contact patient and schedule him?

## 2022-02-15 NOTE — TELEPHONE ENCOUNTER
----- Message from Shamar Pollard sent at 2/15/2022 10:39 AM CST -----  Regarding: appt for abdominal pain      Please contact the Pt to sched a consult for his abdominal pain.     # [phone]

## 2022-03-09 PROBLEM — N17.9 AKI (ACUTE KIDNEY INJURY): Status: RESOLVED | Noted: 2020-01-09 | Resolved: 2022-03-09

## 2022-03-09 PROBLEM — K52.9 COLITIS: Status: ACTIVE | Noted: 2021-02-16

## 2022-03-15 ENCOUNTER — PATIENT OUTREACH (OUTPATIENT)
Dept: ADMINISTRATIVE | Facility: CLINIC | Age: 47
End: 2022-03-15
Payer: MEDICARE

## 2022-03-15 NOTE — PROGRESS NOTES
C3 nurse spoke with Irvin Diaz Jr. for a TCC post hospital discharge follow up call. The patient has a scheduled HOSFU appointment with PETAR Scruggs NP on 3/23/2022 @ 8:00 am.

## 2022-03-23 ENCOUNTER — OFFICE VISIT (OUTPATIENT)
Dept: HOME HEALTH SERVICES | Facility: CLINIC | Age: 47
End: 2022-03-23
Payer: MEDICARE

## 2022-03-23 DIAGNOSIS — N18.30 ACUTE RENAL FAILURE SUPERIMPOSED ON STAGE 3 CHRONIC KIDNEY DISEASE, UNSPECIFIED ACUTE RENAL FAILURE TYPE, UNSPECIFIED WHETHER STAGE 3A OR 3B CKD: ICD-10-CM

## 2022-03-23 DIAGNOSIS — N17.9 ACUTE RENAL FAILURE SUPERIMPOSED ON STAGE 3 CHRONIC KIDNEY DISEASE, UNSPECIFIED ACUTE RENAL FAILURE TYPE, UNSPECIFIED WHETHER STAGE 3A OR 3B CKD: ICD-10-CM

## 2022-03-23 DIAGNOSIS — G89.29 CHRONIC LEFT HIP PAIN: Primary | ICD-10-CM

## 2022-03-23 DIAGNOSIS — M25.552 CHRONIC LEFT HIP PAIN: Primary | ICD-10-CM

## 2022-03-23 DIAGNOSIS — I50.32 CHRONIC DIASTOLIC CHF (CONGESTIVE HEART FAILURE): ICD-10-CM

## 2022-03-23 DIAGNOSIS — I10 ESSENTIAL HYPERTENSION: Chronic | ICD-10-CM

## 2022-03-23 DIAGNOSIS — K74.60 HEPATIC CIRRHOSIS, UNSPECIFIED HEPATIC CIRRHOSIS TYPE, UNSPECIFIED WHETHER ASCITES PRESENT: ICD-10-CM

## 2022-03-23 PROCEDURE — 99497 ADVNCD CARE PLAN 30 MIN: CPT | Mod: S$GLB,,, | Performed by: NURSE PRACTITIONER

## 2022-03-23 PROCEDURE — 99495 TRANSJ CARE MGMT MOD F2F 14D: CPT | Mod: S$GLB,,, | Performed by: NURSE PRACTITIONER

## 2022-03-23 PROCEDURE — 99495 TCM SERVICES (MODERATE COMPLEXITY): ICD-10-PCS | Mod: S$GLB,,, | Performed by: NURSE PRACTITIONER

## 2022-03-23 PROCEDURE — 99497 PR ADVNCD CARE PLAN 30 MIN: ICD-10-PCS | Mod: S$GLB,,, | Performed by: NURSE PRACTITIONER

## 2022-03-24 VITALS
DIASTOLIC BLOOD PRESSURE: 63 MMHG | TEMPERATURE: 98 F | OXYGEN SATURATION: 100 % | HEART RATE: 78 BPM | SYSTOLIC BLOOD PRESSURE: 127 MMHG | RESPIRATION RATE: 18 BRPM

## 2022-03-24 PROBLEM — G89.29 CHRONIC LEFT HIP PAIN: Status: ACTIVE | Noted: 2022-03-24

## 2022-03-24 PROBLEM — M25.552 CHRONIC LEFT HIP PAIN: Status: ACTIVE | Noted: 2022-03-24

## 2022-03-24 NOTE — PROGRESS NOTES
"[facility]  Transition of Care Home Visit    Visit Date: 3/23/2022  Encounter Provider: Kirstin Scruggs NP  PCP:  Shalonda Snyder MD    PRESENTING HISTORY      Patient ID: Irvin Diaz Jr. 46 y.o. male.    Consult Requested By:  No ref. provider found  Reason for Consult:  Transitional Care Coordination    Chief Complaint: Transitional Care     The patient is being seen at home due to a physical debility that presents a taxing effort to leave the home, to mitigate high risk of hospital readmission or due to the limited availability of reliable or safe options for transportation to the point of access to the provider. The visit meets the criteria for medical necessity as defined by CMS as "health-care services needed to prevent, diagnose, or treat an illness, injury, condition, disease, or its symptoms and that meet accepted standards of medicine." Prior to treatment on this visit the chart was reviewed and patient consent was obtained.    HPI:   This is a 45 y/o male with PMH CKD, Cirrhosis, CHF, Diverticulitis, who comes in with complaints of abdominal pain.  Patient reports that abdominal pain started 3 days ago, located in left upper quadrant, intermittent, cramping in nature.  Patient also reports blood in his urine and blood in semen.   Patient recently saw his primary care provider who started him on Bactrim DS for an infection in his urine.  Patient denies fever, chills, cough, congestion, changes in taste or smell, headache, lightheadedness, dizziness, changes in vision, chest pain, shortness of breath.  Patient denies recent drug order to contacts.  Patient has not been able to tolerate p.o. intake today.     Hospital Course:  Patient was admitted for acute on chronic renal failure and colitis.  Patient was treated with IV fluid and antibiotic with Rocephin/Flagyl.  Diarrhea improved and acute on chronic renal failure persisted.  Nephrology consulted and suspecting Bactrim induced.  " Creatinine trended down and nephrology recommended holding spironolactone and cleared for discharge with close follow-up with PCP and nephrology for further care.    Today:  Mr. Irvin Diaz Jr. is a 46 y.o. male is being seen and examined at home today for transitional care visit to the home environment post-discharge from inpatient hospitalization encounter described above. Irvin presents at baseline state of health as reported by patient and caregiver. He reports he would like PCP in [facility]. I gave him number to call and schedule. VSS. Reports diarrhea has resolved. Reports urine output at baseline.  He reports good appetite, bm pattern, sleep pattern.  He is independent with ADLs.  Medications reviewed. Reports taking all medications as prescribed. Denies chest pain, SOB, peripheral edema, ascites, fever, chills, cough, congestion, change in bladder habits, change in bowel habits. He is aware of upcoming follow up appts and plan on attending.  No other needs identified at this time. Risks of environmental exposure to coronavirus discussed including: social distancing, hand hygiene, and limiting departures from the home for necessities only.  Reports understanding and willingness to comply.     Attestation: Screening criteria to assess the level of the patient's risk for infection with COVID-19 as recommended by the CDC at the time of the above documented home visit concluded appropriateness to proceed. Universal precautions were maintained at all times, including provider use of >60% alcohol gel hand  immediately prior to entry and upon departing the patient's home as well as cleaning of equipment used in home visit with antibacterial/germicidal disposable wipes.    Admission Date: 3/7/2022  Hospital Length of Stay: 2 days  Discharge Date and Time:  03/12/2022 9:56 AM  _________________________________________________________________    Review of Systems   Constitutional: Negative for  chills and fever.   HENT: Negative for congestion, dental problem and rhinorrhea.    Eyes: Negative for visual disturbance.   Respiratory: Negative for chest tightness and shortness of breath.    Cardiovascular: Negative for chest pain, palpitations and leg swelling.   Gastrointestinal: Negative for abdominal distention, abdominal pain, constipation, diarrhea, nausea and vomiting.   Genitourinary: Negative for difficulty urinating.   Musculoskeletal: Positive for arthralgias.   Skin: Negative for color change.   Neurological: Negative for dizziness and weakness.   Hematological: Does not bruise/bleed easily.   Psychiatric/Behavioral: Negative for agitation.       Assessments:  · Environmental: single story home, no steps to enter, adequate lighting and temperature control  · Functional Status: Independent with ADL's/IADL's, ambulates with assistance of a cane/walker, continent of bowel and bladder  · Safety: Fall Precautions, COVID Precautions/Social Distancing/Mask Use  · Nutritional: Adequate  · Home Health: n/a  · DME/Supplies: cane    Ethical / Legal: Advance Care Planning   Capacity to make medical decisions:  Yes, Conflict No  · Surrogate decision maker:  Myrna Herrera , Relationship: Mother  · Advance Directives:  No  · HCPOA: No  · [place]:  No  · Code Status:  Full code    Advanced Care Directives, HCPOA and [place] forms left in the home for family review, discussion and signing with instructions to return upon their next provider encounter for inclusion to the medical record. Discussed ACP for 20 minutes.    PAST HISTORY:     Past Medical History:   Diagnosis Date    Alcoholic cirrhosis of liver     Arthritis     ATN (acute tubular necrosis)     CHF (congestive heart failure)     Diverticulitis 01/2020    Esophageal varices     GERD (gastroesophageal reflux disease)     GI bleed     Hip arthritis     Left    Hypertension     Liver cirrhosis     Macrocytic anemia     Pulmonary embolism 08/2018     Unprovoked DVT.  Stop Coumadin due to GIB    Thrombocytopenia        Past Surgical History:   Procedure Laterality Date    ANKLE SURGERY      COLON SURGERY  2007    [facility]    COLONOSCOPY N/A 2/17/2021    Procedure: COLONOSCOPY;  Surgeon: Renea Billingsley MD;  Location: [facility];  Service: Endoscopy;  Laterality: N/A;    COLONOSCOPY W/ BIOPSIES  02/17/2021    ESOPHAGOGASTRODUODENOSCOPY N/A 7/26/2019    Procedure: EGD (ESOPHAGOGASTRODUODENOSCOPY);  Surgeon: Brian Trivedi MD;  Location: [facility];  Service: Endoscopy;  Laterality: N/A;    ESOPHAGOGASTRODUODENOSCOPY N/A 4/8/2020    Procedure: EGD (ESOPHAGOGASTRODUODENOSCOPY);  Surgeon: Donn Acuna MD;  Location: [facility];  Service: Endoscopy;  Laterality: N/A;    ESOPHAGOGASTRODUODENOSCOPY N/A 1/3/2021    Procedure: EGD (ESOPHAGOGASTRODUODENOSCOPY)- coffee ground emesis, hx varices;  Surgeon: Steve Chairez MD;  Location: [facility];  Service: Endoscopy;  Laterality: N/A;    ESOPHAGOGASTRODUODENOSCOPY N/A 5/3/2021    Procedure: EGD (ESOPHAGOGASTRODUODENOSCOPY);  Surgeon: Jeanmarie Ngo MD;  Location: [facility];  Service: Endoscopy;  Laterality: N/A;    ESOPHAGOGASTRODUODENOSCOPY N/A 7/19/2021    Procedure: ESOPHAGOGASTRODUODENOSCOPY (EGD);  Surgeon: Claudio Medeiros MD;  Location: [facility];  Service: Endoscopy;  Laterality: N/A;    ESOPHAGOGASTRODUODENOSCOPY  12/20/2021    ESOPHAGOGASTRODUODENOSCOPY N/A 12/20/2021    Procedure: EGD (ESOPHAGOGASTRODUODENOSCOPY);  Surgeon: Ajay Jackson MD;  Location: [facility];  Service: Endoscopy;  Laterality: N/A;    HERNIA REPAIR Left     Inguinal       Family History   Problem Relation Age of Onset    Hypertension Mother     Breast cancer Mother     Hypertension Father     Prostate cancer Father     Bladder Cancer Father        Social History     Socioeconomic History    Marital status:    Tobacco Use    Smoking status: Former Smoker     Quit date:  2000     Years since quittin.2    Smokeless tobacco: Never Used    Tobacco comment: quit 20 years ago   Substance and Sexual Activity    Alcohol use: Not Currently     Comment: freq    Drug use: Not Currently     Types: Marijuana    Sexual activity: Yes     Comment: occ     Social Determinants of Health     Financial Resource Strain: Low Risk     Difficulty of Paying Living Expenses: Not hard at all   Food Insecurity: No Food Insecurity    Worried About Running Out of Food in the Last Year: Never true    Ran Out of Food in the Last Year: Never true   Transportation Needs: No Transportation Needs    Lack of Transportation (Medical): No    Lack of Transportation (Non-Medical): No   Physical Activity: Inactive    Days of Exercise per Week: 0 days    Minutes of Exercise per Session: 0 min   Stress: No Stress Concern Present    Feeling of Stress : Not at all   Social Connections: Unknown    Frequency of Communication with Friends and Family: More than three times a week    Frequency of Social Gatherings with Friends and Family: More than three times a week    Active Member of Clubs or Organizations: No    Attends Club or Organization Meetings: Never    Marital Status:    Housing Stability: Low Risk     Unable to Pay for Housing in the Last Year: No    Number of Places Lived in the Last Year: 1    Unstable Housing in the Last Year: No       MEDICATIONS & ALLERGIES:     Current Outpatient Medications on File Prior to Visit   Medication Sig Dispense Refill    acamprosate (CAMPRAL) 333 mg tablet Take 666 mg by mouth 3 (three) times daily.      famotidine (PEPCID) 20 MG tablet Take 20 mg by mouth daily as needed.       folic acid (FOLVITE) 1 MG tablet Take 1 tablet (1 mg total) by mouth once daily. 30 tablet 0    NIFEdipine (PROCARDIA-XL) 30 MG (OSM) 24 hr tablet Take 1 tablet (30 mg total) by mouth once daily. 30 tablet 11    ondansetron (ZOFRAN-ODT) 4 MG TbDL Take 1 tablet (4 mg total)  by mouth every 8 (eight) hours as needed (nausea). 20 tablet 0    pantoprazole (PROTONIX) 40 MG tablet Take 1 tablet (40 mg total) by mouth 2 (two) times daily. 60 tablet 1    rifAXIMin (XIFAXAN) 550 mg Tab Take 1 tablet (550 mg total) by mouth 2 (two) times daily. 60 tablet 5    sucralfate (CARAFATE) 1 gram tablet Take 1 g by mouth 4 (four) times daily before meals and nightly.       [DISCONTINUED] furosemide (LASIX) 40 MG tablet Take 0.5 tablets (20 mg total) by mouth once daily. 30 tablet 1    [DISCONTINUED] spironolactone (ALDACTONE) 50 MG tablet Take 1 tablet (50 mg total) by mouth once daily. (Patient not taking: No sig reported) 30 tablet 11     No current facility-administered medications on file prior to visit.        Review of patient's allergies indicates:   Allergen Reactions    Ciprofloxacin hcl Hallucinations    Meperidine Hives     Pt medicated w/multiple medications (demerol, protonix, and zofran)  w/i 10 mins time frame prior to developing localized hives near IV site that med was administered. Pt reports he has/takes all other medications on daily basis.     Morphine Itching    Nsaids (non-steroidal anti-inflammatory drug)      Kidney Disease    Tylenol [acetaminophen]      Hx of liver disease       OBJECTIVE:     Vital Signs:  Vitals:    03/23/22 1000   BP: 127/63   Pulse: 78   Resp: 18   Temp: 98 °F (36.7 °C)     There is no height or weight on file to calculate BMI.       Physical Exam  Constitutional:       Appearance: Normal appearance.   HENT:      Head: Normocephalic and atraumatic.      Nose: No congestion or rhinorrhea.      Mouth/Throat:      Mouth: Mucous membranes are moist.   Eyes:      Extraocular Movements: Extraocular movements intact.   Cardiovascular:      Pulses: Normal pulses.   Pulmonary:      Effort: No respiratory distress.      Breath sounds: Normal breath sounds.   Abdominal:      General: Bowel sounds are normal.      Palpations: Abdomen is soft.    Musculoskeletal:      Cervical back: Neck supple.      Right lower leg: No edema.      Left lower leg: No edema.   Skin:     General: Skin is warm and dry.   Neurological:      Mental Status: He is alert. Mental status is at baseline.   Psychiatric:         Mood and Affect: Mood normal.       Laboratory  Lab Results   Component Value Date    WBC 6.03 03/12/2022    HGB 8.7 (L) 03/12/2022    HCT 26.4 (L) 03/12/2022     (H) 03/12/2022    PLT 51 (L) 03/12/2022     Lab Results   Component Value Date    INR 1.3 (H) 03/07/2022    INR 1.2 07/15/2021    INR 1.4 06/06/2021     Lab Results   Component Value Date    HGBA1C 5.7 11/12/2020     No results for input(s): POCTGLUCOSE in the last 72 hours.    TRANSITION OF CARE:     [facility] Contact Note: 3/15/22    Family and/or Caretaker present at visit?  Yes.  Diagnostic tests reviewed/disposition: No diagnosic tests pending after this hospitalization.  Disease/illness education: Importance of Compliance with all prescribed medications and treatments, COVID Precautions/Social Distancing/Mask Use  Home health/community services discussion/referrals: Patient does not have home health established from hospital visit.  They do not need home health.  If needed, we will set up home health for the patient.   Establishment or re-establishment of referral orders for community resources: No other necessary community resources.   Discussion with other health care providers: No discussion with other health care providers necessary.     Transition of Care Visit:  I have reviewed and updated the history and problem list. I have reconciled the medication list. I have discussed the hospitalization and current medical issues, prognosis and plans with the patient/family.     Medications Reconciliation:   I have reconciled the patient's home medications and discharge medications with the patient/family. I have updated all changes. Refer to After-Visit Medication List.    Discharge  plans, follow-up instructions, future appointments, provider contact information, indicators to seek emergency treatment and encouragement to call for any questions, concerns or clarification of the patient's plan of care explained to patient and/or caregiver(s), whom confirm understanding of provided information and endorse willingness to comply.     ASSESSMENT & PLAN:     HIGH RISK CONDITION(S):  Patient has a condition that poses threat to life and bodily function: ENA on CKD    Irvin was seen today for transitional care.    Diagnoses and all orders for this visit:      Problem List Items Addressed This Visit        Cardiac/Vascular    Essential hypertension (Chronic)     Stable  Continue nifedipine           Chronic diastolic CHF (congestive heart failure)     Appears euvolemic on exam  Lasix and spironolactone recently stopped due to ENA on CKD  1. Denies chest pain, SOB  2. Continue medication as prescribed  3. Schedule follow up with cards  4. Activity and Diet restrictions:   ? Recommend 2-3 gram sodium restriction and 1500cc- 2000cc fluid restriction.  ? Encourage physical activity with graded exercise program.  ? Requested patient to weigh themselves daily, and to notify us if their weight increases by more than 3 lbs in 1 day or 5 lbs in 3 days.                Renal/    Acute renal failure superimposed on stage 3 chronic kidney disease     Reports adequate po intake  Voiding at baseline  Denies peripheral edema  He reports he has been intouch with nephrology and will have upcoming appt              GI    Cirrhosis of liver     No ascites or peripheral edema noted on exam  Continue campral and xifaxan as prescribed  Continue alcohol cessation  Keep scheduled follow up with GI              Orthopedic    Chronic left hip pain - Primary     Referral to pain management           Relevant Orders    Ambulatory referral/consult to Pain Clinic          Instructions:  - [facility] Nurse Practitioner to schedule  home follow-up visit with patient  as needed.  - Continue all medications, treatments and therapies as ordered.   - Follow all instructions, recommendations as discussed.  - Maintain Safety Precautions at all times.  - Attend all medical appointments as scheduled.  - For worsening symptoms: call Primary Care Physician or Nurse Practitioner.  - For emergencies, call 911 or immediately report to the nearest emergency room.  - Limit Risks of environmental exposure to coronavirus/COVID-19 as discussed including: social distancing, hand hygiene, and limiting departures from the home for necessities only.        Were controlled substances prescribed?  No      Scheduled Follow-up :  Future Appointments   Date Time Provider Department Center   5/5/2022  1:40 PM Claudio Medeiros MD [facility]       After Visit Medication List :     Medication List          Accurate as of March 23, 2022 11:59 PM. If you have any questions, ask your nurse or doctor.            CONTINUE taking these medications    acamprosate 333 mg tablet  Commonly known as: CAMPRAL     famotidine 20 MG tablet  Commonly known as: PEPCID     NIFEdipine 30 MG (OSM) 24 hr tablet  Commonly known as: PROCARDIA-XL  Take 1 tablet (30 mg total) by mouth once daily.     ondansetron 4 MG Tbdl  Commonly known as: ZOFRAN-ODT  Take 1 tablet (4 mg total) by mouth every 8 (eight) hours as needed (nausea).     rifAXIMin 550 mg Tab  Commonly known as: XIFAXAN  Take 1 tablet (550 mg total) by mouth 2 (two) times daily.     sucralfate 1 gram tablet  Commonly known as: CARAFATE            Patient consent was obtained prior to treatment on this visit.    Attestation: Screening criteria to assess the level of the patient's risk for infection with COVID-19 as recommended by the CDC at the time of the above documented home visit concluded appropriateness to proceed. Universal precautions were maintained at all times, including provider use of >60% alcohol gel  hand  immediately prior to entry and upon departing the patient's home as well as cleaning of equipment used in home visit with antibacterial/germicidal disposable wipes.     Signature:       FLO Salazar-LILIANE   [facility]    Total Face-to-Face Encounter: 60 minutes with >50% of time spent discussing the care with the patient/family.

## 2022-03-24 NOTE — ASSESSMENT & PLAN NOTE
Reports adequate po intake  Voiding at baseline  Denies peripheral edema  He reports he has been intouch with nephrology and will have upcoming appt

## 2022-03-24 NOTE — ASSESSMENT & PLAN NOTE
Appears euvolemic on exam  Lasix and spironolactone recently stopped due to ENA on CKD  1. Denies chest pain, SOB  2. Continue medication as prescribed  3. Schedule follow up with cards  4. Activity and Diet restrictions:   ? Recommend 2-3 gram sodium restriction and 1500cc- 2000cc fluid restriction.  ? Encourage physical activity with graded exercise program.  ? Requested patient to weigh themselves daily, and to notify us if their weight increases by more than 3 lbs in 1 day or 5 lbs in 3 days.

## 2022-03-24 NOTE — PATIENT INSTRUCTIONS
Instructions:  - [facility] Practition[facility] to schedule home follow-up visit with patient as needed.  - Continue all medications, treatments and therapies as ordered.   - Follow all instructions, recommendations as discussed.  - Maintain Safety Precautions at all times.  - Attend all medical appointments as scheduled.  - For worsening symptoms: call Primary Care Physician or Nurse Practitioner.  - For emergencies, call 911 or immediately report to the nearest emergency room.  - Limit Risks of environmental exposure to coronavirus/COVID-19 as discussed including: social distancing, hand hygiene, and limiting departures from the home for necessities only.

## 2022-03-24 NOTE — ASSESSMENT & PLAN NOTE
No ascites or peripheral edema noted on exam  Continue campral and xifaxan as prescribed  Continue alcohol cessation  Keep scheduled follow up with GI

## 2022-03-29 ENCOUNTER — TELEPHONE (OUTPATIENT)
Dept: PAIN MEDICINE | Facility: CLINIC | Age: 47
End: 2022-03-29
Payer: MEDICARE

## 2022-03-29 NOTE — PROGRESS NOTES
Chronic Pain - New Consult    Referring Physician: Julia Scruggs NP    Date: 03/29/2022     Re: Irvin Diaz Jr.  MR#: [id number]  YOB: 1975  Age: 46 y.o.    Chief Complaint: No chief complaint on file.        **This note is dictated using the M*Modal Fluency Direct word recognition program. There are word recognition mistakes that are occasionally missed on review.**    ASSESSMENT: 46 y.o. year old male with left hip pain, consistent with     No diagnosis found.      PLAN:     Avascular necrosis of the left hip   -We will schedule the patient for left hip block.  Risks,benefits, and alternatives of the procedure were discussed with the patient and He would like to proceed with the procedure.  At this juncture, we believe that the patient's medical comorbidity status adds high risk and complexity to our proposed evaluation and treatment.  -will plan on RFA on 4/29 if he gets good relief from the hip block.  Need to clarify about platelet cut off.  -Tramadol #60 BID PRN. Goal to get off completely once his pain is improved from the RFA.  If patient needs chronic opioids then will need to have UDS and sign opioid contract, but his goal is to not need medications.  -refer to ortho for new evaluation. Likely a difficult case given comobidities.    Thorombocytopenia  -platelets are 51 on 3/12/22  -will clarify with Aaronanos about cutoff for hip block    Cirrhosis 2/2 past EtOH  -following with hepatology  -may need liver transplant    Kidney disease  -GFR 28    - RTC 2 months  - Counseled patient regarding the importance of  activity modification.    The above plan and management options were discussed at length with patient. Patient is in agreement with the above and verbalized understanding. It will be communicated with the referring physician via electronic record, fax, or mail.  Lab/study reports reviewed were important and necessary because subsequent medical and treatment recommendations required  review of the above lab/study reports. Images viewed/reviewed above were important and necessary because subsequent medical and treatment recommendations required review of the reviewed image(s).     Electronically signed by:  Robbin De La Garza DO  03/29/2022    =========================================================================================================    SUBJECTIVE:    Irvin Diaz Jr. is a 46 y.o. male presents to the clinic for the evaluation of left hip pain. The pain started 3 years ago following fall and symptoms have been worsening. The patient states used to get hip injections of the left hip.  He is unable to walk without a cane.  He states that he was told after imaging that he had a fracture in 2019.  He had an injection (and aspiration) in December/january and it helps the pain for about 3 weeks.  After the injections he still needs the pain but it helps.  The ortho physician at [facility]    CT note about the hip:  There is collapse of the left femoral head superior portion with bone-on-bone as well as underlying femoral sclerotic changes suggesting AVN with severe secondary degenerative changes of the acetabulum and the left hip effusion stable since prior exam.     The patient says that he has seen a ortho surgeon in the past and that they are unable to do surgery due to his liver failure.    Pain Description:    The pain is located in the left hip area and radiates to the left leg/ groin area.    At BEST  8/10   At WORST  10/10 on the WORST day.    On average pain is rated as 8/10.   Today the pain is rated as 8/10  The pain is continuous.  The pain is described as aching, shooting and throbbing    Symptoms interfere with daily activity, sleeping and work.   Exacerbating factors: Standing, Laying, Bending, Walking, Night Time, Morning, Flexing, Lifting and Getting out of bed/chair.    Mitigating factors laying down and medications.   He reports 5 hours of sleep  per night.    Physical Therapy/Home Exercise: No, not currently in physical therapy or home exercise program    Current Pain Medications:    - none    Failed Pain Medications:    - cannot take NSAIDs due to gastric bleeding, cannot take tylenol due to liver failure.     Pain Treatment Therapies:    Pain procedures: hip injections  Physical Therapy: physical therapy made things worse  Chiropractor: none  Acupuncture: none  TENS unit: none  Spinal decompression: none  Joint replacement: none    Patient denies urinary incontinence, bowel incontinence and loss of sensations. (+) weakness in the left leg  Patient denies any suicidal or homicidal ideations     report:  Reviewed and consistent with medication use as prescribed.    Imaging:   XR abdomen 03/2022:  Please note the hemidiaphragms are not included in their entirety.  Multiple surgical clips and presumed anastomotic suture line project over the right abdomen.  There are a few mildly prominent loops of gas-filled small bowel loops present measuring up to 3.3 cm in the left abdomen.  Limited evaluation of free intraperitoneal air to patient positioning/technique.  Calcifications project over the pelvis, likely phleboliths.  Osseous structures demonstrate significant degenerative change of the left hip with chronic appearing deformity/collapse of the left femoral head.    CT abdomen 03/2022:  Mild circumferential wall thickening involving the proximal colon extending from the ileocolic anastomosis at the hepatic flexure through around the level of the splenic flexure suggests inflammatory or infectious colitis.  No convincing transmural inflammation is seen.     Postoperative changes of proximal colectomy and scattered mild diverticulosis of the more distal colon.  No convincing diverticulitis, bowel obstruction, free air or abscess.     Findings of cirrhosis and mild abdominal ascites as described essentially stable.     Left hip findings suggesting AVN as  described.     This report was flagged in Epic as abnormal.     No other significant or acute findings.       Past Medical History:   Diagnosis Date    Alcoholic cirrhosis of liver     Arthritis     ATN (acute tubular necrosis)     CHF (congestive heart failure)     Diverticulitis 01/2020    Esophageal varices     GERD (gastroesophageal reflux disease)     GI bleed     Hip arthritis     Left    Hypertension     Liver cirrhosis     Macrocytic anemia     Pulmonary embolism 08/2018    Unprovoked DVT.  Stop Coumadin due to GIB    Thrombocytopenia      Past Surgical History:   Procedure Laterality Date    ANKLE SURGERY      COLON SURGERY  2007    [facility]    COLONOSCOPY N/A 2/17/2021    Procedure: COLONOSCOPY;  Surgeon: Renea Billingsley MD;  Location: [facility];  Service: Endoscopy;  Laterality: N/A;    COLONOSCOPY W/ BIOPSIES  02/17/2021    ESOPHAGOGASTRODUODENOSCOPY N/A 7/26/2019    Procedure: EGD (ESOPHAGOGASTRODUODENOSCOPY);  Surgeon: Brian Trivedi MD;  Location: [facility];  Service: Endoscopy;  Laterality: N/A;    ESOPHAGOGASTRODUODENOSCOPY N/A 4/8/2020    Procedure: EGD (ESOPHAGOGASTRODUODENOSCOPY);  Surgeon: Donn Acuna MD;  Location: [facility];  Service: Endoscopy;  Laterality: N/A;    ESOPHAGOGASTRODUODENOSCOPY N/A 1/3/2021    Procedure: EGD (ESOPHAGOGASTRODUODENOSCOPY)- coffee ground emesis, hx varices;  Surgeon: Steve Chairez MD;  Location: [facility];  Service: Endoscopy;  Laterality: N/A;    ESOPHAGOGASTRODUODENOSCOPY N/A 5/3/2021    Procedure: EGD (ESOPHAGOGASTRODUODENOSCOPY);  Surgeon: Jeanmarie Ngo MD;  Location: [facility];  Service: Endoscopy;  Laterality: N/A;    ESOPHAGOGASTRODUODENOSCOPY N/A 7/19/2021    Procedure: ESOPHAGOGASTRODUODENOSCOPY (EGD);  Surgeon: Claudio Medeiros MD;  Location: [facility];  Service: Endoscopy;  Laterality: N/A;    ESOPHAGOGASTRODUODENOSCOPY  12/20/2021    ESOPHAGOGASTRODUODENOSCOPY N/A 12/20/2021     Procedure: EGD (ESOPHAGOGASTRODUODENOSCOPY);  Surgeon: Ajay Jackson MD;  Location: [facility];  Service: Endoscopy;  Laterality: N/A;    HERNIA REPAIR Left     Inguinal     Social History     Socioeconomic History    Marital status:    Tobacco Use    Smoking status: Former Smoker     Quit date:      Years since quittin.2    Smokeless tobacco: Never Used    Tobacco comment: quit 20 years ago   Substance and Sexual Activity    Alcohol use: Not Currently     Comment: freq    Drug use: Not Currently     Types: Marijuana    Sexual activity: Yes     Comment: occ     Social Determinants of Health     Financial Resource Strain: Low Risk     Difficulty of Paying Living Expenses: Not hard at all   Food Insecurity: No Food Insecurity    Worried About Running Out of Food in the Last Year: Never true    Ran Out of Food in the Last Year: Never true   Transportation Needs: No Transportation Needs    Lack of Transportation (Medical): No    Lack of Transportation (Non-Medical): No   Physical Activity: Inactive    Days of Exercise per Week: 0 days    Minutes of Exercise per Session: 0 min   Stress: No Stress Concern Present    Feeling of Stress : Not at all   Social Connections: Unknown    Frequency of Communication with Friends and Family: More than three times a week    Frequency of Social Gatherings with Friends and Family: More than three times a week    Active Member of Clubs or Organizations: No    Attends Club or Organization Meetings: Never    Marital Status:    Housing Stability: Low Risk     Unable to Pay for Housing in the Last Year: No    Number of Places Lived in the Last Year: 1    Unstable Housing in the Last Year: No     Family History   Problem Relation Age of Onset    Hypertension Mother     Breast cancer Mother     Hypertension Father     Prostate cancer Father     Bladder Cancer Father        Review of patient's allergies indicates:   Allergen Reactions     Ciprofloxacin hcl Hallucinations    Meperidine Hives     Pt medicated w/multiple medications (demerol, protonix, and zofran)  w/i 10 mins time frame prior to developing localized hives near IV site that med was administered. Pt reports he has/takes all other medications on daily basis.     Morphine Itching    Nsaids (non-steroidal anti-inflammatory drug)      Kidney Disease    Tylenol [acetaminophen]      Hx of liver disease       Current Outpatient Medications   Medication Sig    acamprosate (CAMPRAL) 333 mg tablet Take 666 mg by mouth 3 (three) times daily.    famotidine (PEPCID) 20 MG tablet Take 20 mg by mouth daily as needed.     folic acid (FOLVITE) 1 MG tablet Take 1 tablet (1 mg total) by mouth once daily.    NIFEdipine (PROCARDIA-XL) 30 MG (OSM) 24 hr tablet Take 1 tablet (30 mg total) by mouth once daily.    ondansetron (ZOFRAN-ODT) 4 MG TbDL Take 1 tablet (4 mg total) by mouth every 8 (eight) hours as needed (nausea).    pantoprazole (PROTONIX) 40 MG tablet Take 1 tablet (40 mg total) by mouth 2 (two) times daily.    rifAXIMin (XIFAXAN) 550 mg Tab Take 1 tablet (550 mg total) by mouth 2 (two) times daily.    sucralfate (CARAFATE) 1 gram tablet Take 1 g by mouth 4 (four) times daily before meals and nightly.      No current facility-administered medications for this visit.       REVIEW OF SYSTEMS:    GENERAL:  No weight loss, malaise or fevers.   HEENT:   No recent changes in vision or hearing   NECK:  Negative for lumps, no difficulty with swallowing.  RESPIRATORY:  Negative for cough, wheezing or shortness of breath, patient denies any recent URI.  CARDIOVASCULAR:  Negative for chest pain, leg swelling or palpitations.  GI:  Negative for abdominal discomfort, blood in stools or black stools or change in bowel habits.  MUSCULOSKELETAL:  See HPI.  SKIN:  Negative for lesions, rash, and itching. + skin itching  PSYCH:  No mood disorder or recent psychosocial stressors.  Patients sleep is not  disturbed secondary to pain.  HEMATOLOGY/LYMPHOLOGY:  Negative for prolonged bleeding, bruising easily or swollen nodes.  Patient is not currently taking any anti-coagulants  NEURO:   No history of headaches, syncope, paralysis, seizures or tremors.  All other reviewed and negative other than HPI.    OBJECTIVE:    There were no vitals taken for this visit.    PHYSICAL EXAMINATION:    GENERAL: Fraile, in no acute distress, alert and oriented x3.  PSYCH:  Mood and affect appropriate.  SKIN: Skin color, texture, turgor normal, no rashes or lesions.  HEAD/FACE:  Normocephalic, atraumatic. Cranial nerves grossly intact.  CV: RRR with palpation of the radial artery.  PULM: CTAB. No evidence of respiratory difficulty, symmetric chest rise.  GI:  Soft    MUSKULOSKELETAL:    EXTREMITIES:   Left Hip Exam (limited ROM and exam 2/2/ pain)  - Log Roll Positive  - FADIR Positive  - Stinchfield Unable to Perform  - Hip Scour Unable to Perform  - GTB Tenderness Positive   -TTP over anterior and posterior hip joint  - TTP of the superior knee joint.    MUSCULOSKELETAL:  Atrophy of the left leg compared to the right  No deformities, edema, or skin discoloration are noted on visible skin. Good capillary refill.     NEURO: Bilateral upper and lower extremity coordination and muscle stretch reflexes are physiologic and symmetric.      NEUROLOGICAL EXAM:  MENTAL STATUS: A x O x 3, good concentration, speech is fluent and goal directed  MEMORY: recent and remote are intact  CN: CN2-12 grossly intact  MOTOR: 5/5 in all muscle groups on the right. HF 3/5 on the left, 4/5 KE, 4/5 KF with pain  DTRs: 3+ intact symmetric patella and 2 + achilles  Sensation:    -yes Loss of sensation in a left lower L-1 and L-2 on the left distribution.  Babinski: absent     Gait: Cane, abnormal. Short stride. Favors left leg

## 2022-03-29 NOTE — TELEPHONE ENCOUNTER
This message is for Mr. Macias in regards to his/her appointment 03/30/2022 at 11:00am    [facility] Policy: For the safety of all patients and staff members. Please review [facility] Patient Visitor policy below.    Patient Visitor Policy:     Due to social distancing and limited seating, the clinic staff asks that all patients arrive on time for their scheduled appointment.  During this visit, we ask all patients to only have one visitor over the age of 18yrs old to accompany them to be seen by Dr. Robbin De La Garza.  If the patient does not require assistance with their visit, all visitors remain in the waiting area.  Upon arriving, patients and visitors are required to wear a face mask.  If the patient or visitor does not have a face mask, one will be provided to you when entering the facility.      [facility] providers and Mid-levels offer interventional, procedure-based options to treat chronic pain.  The goal is to manage chronic pain by reducing pain frequency and intensity and address your functional goals for activities of daily living while simultaneously reducing or eliminating your reliance on medications. Please bring any records or imaging from prior treatments to visit for provider review.  You will be presented with a multi-modal treatment plan that may or may not include imaging, interventional procedures, Physical/occupational/aqua therapy, pain creams, and non-narcotic pain medications used for the treatments of chronic pain.    Address: [address], [address] [place] [address]    Thank you,  Interventional Pain Staff    Patient verbalized understanding

## 2022-03-30 ENCOUNTER — OFFICE VISIT (OUTPATIENT)
Dept: PAIN MEDICINE | Facility: CLINIC | Age: 47
End: 2022-03-30
Payer: MEDICARE

## 2022-03-30 ENCOUNTER — TELEPHONE (OUTPATIENT)
Dept: PAIN MEDICINE | Facility: CLINIC | Age: 47
End: 2022-03-30

## 2022-03-30 VITALS
HEIGHT: 72 IN | DIASTOLIC BLOOD PRESSURE: 83 MMHG | TEMPERATURE: 98 F | WEIGHT: 188 LBS | RESPIRATION RATE: 18 BRPM | BODY MASS INDEX: 25.47 KG/M2 | SYSTOLIC BLOOD PRESSURE: 142 MMHG | HEART RATE: 93 BPM

## 2022-03-30 DIAGNOSIS — M87.052 AVASCULAR NECROSIS OF HIP, LEFT: Primary | ICD-10-CM

## 2022-03-30 DIAGNOSIS — M25.552 CHRONIC LEFT HIP PAIN: Primary | ICD-10-CM

## 2022-03-30 DIAGNOSIS — D69.6 THROMBOCYTOPENIA: ICD-10-CM

## 2022-03-30 DIAGNOSIS — M25.552 CHRONIC LEFT HIP PAIN: ICD-10-CM

## 2022-03-30 DIAGNOSIS — G89.29 CHRONIC LEFT HIP PAIN: Primary | ICD-10-CM

## 2022-03-30 DIAGNOSIS — G89.29 CHRONIC LEFT HIP PAIN: ICD-10-CM

## 2022-03-30 PROCEDURE — 99999 PR PBB SHADOW E&M-EST. PATIENT-LVL III: ICD-10-PCS | Mod: PBBFAC,,, | Performed by: STUDENT IN AN ORGANIZED HEALTH CARE EDUCATION/TRAINING PROGRAM

## 2022-03-30 PROCEDURE — 99205 OFFICE O/P NEW HI 60 MIN: CPT | Mod: S$PBB,,, | Performed by: STUDENT IN AN ORGANIZED HEALTH CARE EDUCATION/TRAINING PROGRAM

## 2022-03-30 PROCEDURE — 99205 PR OFFICE/OUTPT VISIT, NEW, LEVL V, 60-74 MIN: ICD-10-PCS | Mod: S$PBB,,, | Performed by: STUDENT IN AN ORGANIZED HEALTH CARE EDUCATION/TRAINING PROGRAM

## 2022-03-30 PROCEDURE — 99999 PR PBB SHADOW E&M-EST. PATIENT-LVL III: CPT | Mod: PBBFAC,,, | Performed by: STUDENT IN AN ORGANIZED HEALTH CARE EDUCATION/TRAINING PROGRAM

## 2022-03-30 PROCEDURE — 99213 OFFICE O/P EST LOW 20 MIN: CPT | Mod: PBBFAC | Performed by: STUDENT IN AN ORGANIZED HEALTH CARE EDUCATION/TRAINING PROGRAM

## 2022-03-30 RX ORDER — TRAMADOL HYDROCHLORIDE 50 MG/1
50 TABLET ORAL EVERY 12 HOURS PRN
Qty: 60 TABLET | Refills: 0 | Status: SHIPPED | OUTPATIENT
Start: 2022-03-30 | End: 2022-04-29

## 2022-03-31 ENCOUNTER — TELEPHONE (OUTPATIENT)
Dept: PAIN MEDICINE | Facility: CLINIC | Age: 47
End: 2022-03-31
Payer: MEDICARE

## 2022-03-31 DIAGNOSIS — G89.29 CHRONIC LEFT HIP PAIN: Primary | ICD-10-CM

## 2022-03-31 DIAGNOSIS — M25.552 CHRONIC LEFT HIP PAIN: Primary | ICD-10-CM

## 2022-04-20 ENCOUNTER — TELEPHONE (OUTPATIENT)
Dept: PAIN MEDICINE | Facility: CLINIC | Age: 47
End: 2022-04-20
Payer: MEDICARE

## 2022-04-20 NOTE — TELEPHONE ENCOUNTER
Staff spoke with the patient regarding their procedure with Dr. De La Garza scheduled on 04/22/2022; the patient was provided the Arrival Information and scheduled time 06:30AM    The patient verbalized understanding.    Thank you,

## 2022-04-22 ENCOUNTER — TELEPHONE (OUTPATIENT)
Dept: PAIN MEDICINE | Facility: CLINIC | Age: 47
End: 2022-04-22
Payer: MEDICARE

## 2022-04-22 NOTE — TELEPHONE ENCOUNTER
----- Message from Robbin De La Garza DO sent at 4/22/2022  9:06 AM CDT -----  He hasn't shown up for his procedure.  Can you call him and make sure everything is okay? Thank you

## 2022-04-22 NOTE — TELEPHONE ENCOUNTER
Staff spoke with patient regarding procedure dated for today, patient was informed that we have left vm's stating the time for him to arrive to his procedure with instructions. Patient states he does not check his vm and also tried to call to get the time of arrival. We informed patient that we will move this procedure to next week Friday the 29th and the scheduled one for the 29th will be moved to the 6th of May. Patient verbalized understanding.

## 2022-04-28 ENCOUNTER — TELEPHONE (OUTPATIENT)
Dept: PAIN MEDICINE | Facility: CLINIC | Age: 47
End: 2022-04-28
Payer: MEDICARE

## 2022-04-28 NOTE — TELEPHONE ENCOUNTER
----- Message from Tremontana Chevalier sent at 4/28/2022  4:02 PM CDT -----  Regarding: Pt advice  Contact: pt  or   Pt called to get the arrival time of tomorrow's procedure for pain mgmt. Please call.

## 2022-04-28 NOTE — TELEPHONE ENCOUNTER
Staff spoke with patient regarding procedure, we canceled procedure date and wants patient to come in for a follow up to discuss another plan of care. Patient verbalized understanding and will call when he is ready to set up f/u. Staff verbalized understanding.

## 2022-05-01 ENCOUNTER — HOSPITAL ENCOUNTER (INPATIENT)
Facility: OTHER | Age: 47
LOS: 2 days | Discharge: HOME | DRG: 368 | End: 2022-05-04
Attending: EMERGENCY MEDICINE | Admitting: INTERNAL MEDICINE
Payer: MEDICARE

## 2022-05-01 DIAGNOSIS — E87.29 ALCOHOLIC KETOACIDOSIS: ICD-10-CM

## 2022-05-01 DIAGNOSIS — F10.939 ALCOHOL WITHDRAWAL SYNDROME WITH COMPLICATION: ICD-10-CM

## 2022-05-01 DIAGNOSIS — K92.0 HEMATEMESIS WITH NAUSEA: ICD-10-CM

## 2022-05-01 DIAGNOSIS — F10.930 ALCOHOL WITHDRAWAL SYNDROME WITHOUT COMPLICATION: ICD-10-CM

## 2022-05-01 DIAGNOSIS — K74.60 HEPATIC CIRRHOSIS, UNSPECIFIED HEPATIC CIRRHOSIS TYPE, UNSPECIFIED WHETHER ASCITES PRESENT: Primary | ICD-10-CM

## 2022-05-01 DIAGNOSIS — K92.2 GI BLEED: ICD-10-CM

## 2022-05-01 LAB
ABO + RH BLD: NORMAL
ALBUMIN SERPL BCP-MCNC: 3.5 G/DL (ref 3.5–5.2)
ALP SERPL-CCNC: 91 U/L (ref 55–135)
ALT SERPL W/O P-5'-P-CCNC: 31 U/L (ref 10–44)
AMPHET+METHAMPHET UR QL: NEGATIVE
ANION GAP SERPL CALC-SCNC: 33 MMOL/L (ref 8–16)
ANISOCYTOSIS BLD QL SMEAR: SLIGHT
ANISOCYTOSIS BLD QL SMEAR: SLIGHT
AST SERPL-CCNC: 95 U/L (ref 10–40)
BACTERIA #/AREA URNS HPF: ABNORMAL /HPF
BARBITURATES UR QL SCN>200 NG/ML: NEGATIVE
BASOPHILS # BLD AUTO: 0.02 K/UL (ref 0–0.2)
BASOPHILS # BLD AUTO: 0.03 K/UL (ref 0–0.2)
BASOPHILS NFR BLD: 0.3 % (ref 0–1.9)
BASOPHILS NFR BLD: 0.4 % (ref 0–1.9)
BENZODIAZ UR QL SCN>200 NG/ML: NEGATIVE
BILIRUB SERPL-MCNC: 5.6 MG/DL (ref 0.1–1)
BILIRUB UR QL STRIP: NEGATIVE
BLD GP AB SCN CELLS X3 SERPL QL: NORMAL
BLD PROD TYP BPU: NORMAL
BLOOD UNIT EXPIRATION DATE: NORMAL
BLOOD UNIT TYPE CODE: 6200
BLOOD UNIT TYPE: NORMAL
BUN SERPL-MCNC: 23 MG/DL (ref 6–20)
BZE UR QL SCN: NEGATIVE
CALCIUM SERPL-MCNC: 9.2 MG/DL (ref 8.7–10.5)
CANNABINOIDS UR QL SCN: NEGATIVE
CHLORIDE SERPL-SCNC: 99 MMOL/L (ref 95–110)
CLARITY UR: CLEAR
CO2 SERPL-SCNC: 10 MMOL/L (ref 23–29)
CODING SYSTEM: NORMAL
COLOR UR: YELLOW
CREAT SERPL-MCNC: 2 MG/DL (ref 0.5–1.4)
CREAT UR-MCNC: 175 MG/DL (ref 23–375)
CTP QC/QA: YES
DIFFERENTIAL METHOD: ABNORMAL
DIFFERENTIAL METHOD: ABNORMAL
DISPENSE STATUS: NORMAL
EOSINOPHIL # BLD AUTO: 0 K/UL (ref 0–0.5)
EOSINOPHIL # BLD AUTO: 0.2 K/UL (ref 0–0.5)
EOSINOPHIL NFR BLD: 0 % (ref 0–8)
EOSINOPHIL NFR BLD: 3 % (ref 0–8)
ERYTHROCYTE [DISTWIDTH] IN BLOOD BY AUTOMATED COUNT: 15.9 % (ref 11.5–14.5)
ERYTHROCYTE [DISTWIDTH] IN BLOOD BY AUTOMATED COUNT: 16.2 % (ref 11.5–14.5)
EST. GFR  (AFRICAN AMERICAN): 45 ML/MIN/1.73 M^2
EST. GFR  (NON AFRICAN AMERICAN): 39 ML/MIN/1.73 M^2
ETHANOL SERPL-MCNC: <10 MG/DL
ETHANOL UR-MCNC: 21 MG/DL
FERRITIN SERPL-MCNC: 309 NG/ML (ref 20–300)
GLUCOSE SERPL-MCNC: 51 MG/DL (ref 70–110)
GLUCOSE UR QL STRIP: NEGATIVE
GRAN CASTS #/AREA URNS LPF: 1 /LPF
HCT VFR BLD AUTO: 24.1 % (ref 40–54)
HCT VFR BLD AUTO: 27.2 % (ref 40–54)
HGB BLD-MCNC: 8.1 G/DL (ref 14–18)
HGB BLD-MCNC: 8.8 G/DL (ref 14–18)
HGB UR QL STRIP: ABNORMAL
HYALINE CASTS #/AREA URNS LPF: 3 /LPF
HYPOCHROMIA BLD QL SMEAR: ABNORMAL
IMM GRANULOCYTES # BLD AUTO: 0.03 K/UL (ref 0–0.04)
IMM GRANULOCYTES # BLD AUTO: 0.05 K/UL (ref 0–0.04)
IMM GRANULOCYTES NFR BLD AUTO: 0.5 % (ref 0–0.5)
IMM GRANULOCYTES NFR BLD AUTO: 0.6 % (ref 0–0.5)
INR PPP: 1.3 (ref 0.8–1.2)
IRON SERPL-MCNC: 225 UG/DL (ref 45–160)
KETONES UR QL STRIP: ABNORMAL
LEUKOCYTE ESTERASE UR QL STRIP: NEGATIVE
LYMPHOCYTES # BLD AUTO: 0.6 K/UL (ref 1–4.8)
LYMPHOCYTES # BLD AUTO: 0.7 K/UL (ref 1–4.8)
LYMPHOCYTES NFR BLD: 10.8 % (ref 18–48)
LYMPHOCYTES NFR BLD: 6.8 % (ref 18–48)
MCH RBC QN AUTO: 38.9 PG (ref 27–31)
MCH RBC QN AUTO: 39.5 PG (ref 27–31)
MCHC RBC AUTO-ENTMCNC: 32.4 G/DL (ref 32–36)
MCHC RBC AUTO-ENTMCNC: 33.6 G/DL (ref 32–36)
MCV RBC AUTO: 116 FL (ref 82–98)
MCV RBC AUTO: 122 FL (ref 82–98)
METHADONE UR QL SCN>300 NG/ML: NEGATIVE
MICROSCOPIC COMMENT: ABNORMAL
MONOCYTES # BLD AUTO: 0.6 K/UL (ref 0.3–1)
MONOCYTES # BLD AUTO: 0.7 K/UL (ref 0.3–1)
MONOCYTES NFR BLD: 11.8 % (ref 4–15)
MONOCYTES NFR BLD: 7.8 % (ref 4–15)
NEUTROPHILS # BLD AUTO: 4.5 K/UL (ref 1.8–7.7)
NEUTROPHILS # BLD AUTO: 6.8 K/UL (ref 1.8–7.7)
NEUTROPHILS NFR BLD: 73.6 % (ref 38–73)
NEUTROPHILS NFR BLD: 84.4 % (ref 38–73)
NITRITE UR QL STRIP: NEGATIVE
NRBC BLD-RTO: 0 /100 WBC
NRBC BLD-RTO: 0 /100 WBC
OPIATES UR QL SCN: NEGATIVE
PCP UR QL SCN>25 NG/ML: NEGATIVE
PH UR STRIP: 6 [PH] (ref 5–8)
PLATELET # BLD AUTO: 37 K/UL (ref 150–450)
PLATELET # BLD AUTO: 51 K/UL (ref 150–450)
PLATELET BLD QL SMEAR: ABNORMAL
PLATELET BLD QL SMEAR: ABNORMAL
PMV BLD AUTO: 11.2 FL (ref 9.2–12.9)
PMV BLD AUTO: 11.2 FL (ref 9.2–12.9)
POCT GLUCOSE: 128 MG/DL (ref 70–110)
POCT GLUCOSE: 148 MG/DL (ref 70–110)
POCT GLUCOSE: 188 MG/DL (ref 70–110)
POCT GLUCOSE: 202 MG/DL (ref 70–110)
POCT GLUCOSE: 51 MG/DL (ref 70–110)
POCT GLUCOSE: 60 MG/DL (ref 70–110)
POCT GLUCOSE: 76 MG/DL (ref 70–110)
POTASSIUM SERPL-SCNC: 4.2 MMOL/L (ref 3.5–5.1)
PROT SERPL-MCNC: 8.8 G/DL (ref 6–8.4)
PROT UR QL STRIP: ABNORMAL
PROTHROMBIN TIME: 14.5 SEC (ref 9–12.5)
RBC # BLD AUTO: 2.08 M/UL (ref 4.6–6.2)
RBC # BLD AUTO: 2.23 M/UL (ref 4.6–6.2)
RBC #/AREA URNS HPF: 4 /HPF (ref 0–4)
SARS-COV-2 RDRP RESP QL NAA+PROBE: NEGATIVE
SATURATED IRON: 72 % (ref 20–50)
SODIUM SERPL-SCNC: 142 MMOL/L (ref 136–145)
SP GR UR STRIP: >=1.03 (ref 1–1.03)
TOTAL IRON BINDING CAPACITY: 311 UG/DL (ref 250–450)
TOXICOLOGY INFORMATION: ABNORMAL
TRANSFERRIN SERPL-MCNC: 210 MG/DL (ref 200–375)
UNIT NUMBER: NORMAL
URN SPEC COLLECT METH UR: ABNORMAL
UROBILINOGEN UR STRIP-ACNC: NEGATIVE EU/DL
WBC # BLD AUTO: 6.1 K/UL (ref 3.9–12.7)
WBC # BLD AUTO: 8.08 K/UL (ref 3.9–12.7)
WBC #/AREA URNS HPF: 1 /HPF (ref 0–5)

## 2022-05-01 PROCEDURE — 63600175 PHARM REV CODE 636 W HCPCS: Performed by: PHYSICIAN ASSISTANT

## 2022-05-01 PROCEDURE — 86901 BLOOD TYPING SEROLOGIC RH(D): CPT | Performed by: EMERGENCY MEDICINE

## 2022-05-01 PROCEDURE — 96361 HYDRATE IV INFUSION ADD-ON: CPT

## 2022-05-01 PROCEDURE — 96374 THER/PROPH/DIAG INJ IV PUSH: CPT

## 2022-05-01 PROCEDURE — 93010 ELECTROCARDIOGRAM REPORT: CPT | Mod: ,,, | Performed by: INTERNAL MEDICINE

## 2022-05-01 PROCEDURE — 99291 CRITICAL CARE FIRST HOUR: CPT | Mod: 25

## 2022-05-01 PROCEDURE — U0002 COVID-19 LAB TEST NON-CDC: HCPCS | Performed by: EMERGENCY MEDICINE

## 2022-05-01 PROCEDURE — 85025 COMPLETE CBC W/AUTO DIFF WBC: CPT | Mod: 91 | Performed by: INTERNAL MEDICINE

## 2022-05-01 PROCEDURE — 80307 DRUG TEST PRSMV CHEM ANLYZR: CPT | Performed by: INTERNAL MEDICINE

## 2022-05-01 PROCEDURE — 85610 PROTHROMBIN TIME: CPT | Performed by: INTERNAL MEDICINE

## 2022-05-01 PROCEDURE — 82962 GLUCOSE BLOOD TEST: CPT

## 2022-05-01 PROCEDURE — 82607 VITAMIN B-12: CPT | Performed by: INTERNAL MEDICINE

## 2022-05-01 PROCEDURE — C9113 INJ PANTOPRAZOLE SODIUM, VIA: HCPCS | Performed by: INTERNAL MEDICINE

## 2022-05-01 PROCEDURE — 96372 THER/PROPH/DIAG INJ SC/IM: CPT | Performed by: INTERNAL MEDICINE

## 2022-05-01 PROCEDURE — 81000 URINALYSIS NONAUTO W/SCOPE: CPT | Performed by: EMERGENCY MEDICINE

## 2022-05-01 PROCEDURE — 25000003 PHARM REV CODE 250: Performed by: INTERNAL MEDICINE

## 2022-05-01 PROCEDURE — 36430 TRANSFUSION BLD/BLD COMPNT: CPT

## 2022-05-01 PROCEDURE — 80053 COMPREHEN METABOLIC PANEL: CPT | Performed by: EMERGENCY MEDICINE

## 2022-05-01 PROCEDURE — 82746 ASSAY OF FOLIC ACID SERUM: CPT | Performed by: INTERNAL MEDICINE

## 2022-05-01 PROCEDURE — 94761 N-INVAS EAR/PLS OXIMETRY MLT: CPT

## 2022-05-01 PROCEDURE — 85025 COMPLETE CBC W/AUTO DIFF WBC: CPT | Performed by: EMERGENCY MEDICINE

## 2022-05-01 PROCEDURE — 84466 ASSAY OF TRANSFERRIN: CPT | Performed by: INTERNAL MEDICINE

## 2022-05-01 PROCEDURE — S5010 5% DEXTROSE AND 0.45% SALINE: HCPCS | Performed by: INTERNAL MEDICINE

## 2022-05-01 PROCEDURE — 82728 ASSAY OF FERRITIN: CPT | Performed by: INTERNAL MEDICINE

## 2022-05-01 PROCEDURE — 99220 PR INITIAL OBSERVATION CARE,LEVL III: CPT | Mod: ,,, | Performed by: INTERNAL MEDICINE

## 2022-05-01 PROCEDURE — 96375 TX/PRO/DX INJ NEW DRUG ADDON: CPT

## 2022-05-01 PROCEDURE — 99220 PR INITIAL OBSERVATION CARE,LEVL III: ICD-10-PCS | Mod: ,,, | Performed by: INTERNAL MEDICINE

## 2022-05-01 PROCEDURE — 93010 EKG 12-LEAD: ICD-10-PCS | Mod: ,,, | Performed by: INTERNAL MEDICINE

## 2022-05-01 PROCEDURE — 93005 ELECTROCARDIOGRAM TRACING: CPT

## 2022-05-01 PROCEDURE — 25000003 PHARM REV CODE 250: Performed by: PHYSICIAN ASSISTANT

## 2022-05-01 PROCEDURE — 36415 COLL VENOUS BLD VENIPUNCTURE: CPT | Performed by: INTERNAL MEDICINE

## 2022-05-01 PROCEDURE — 63600175 PHARM REV CODE 636 W HCPCS: Performed by: EMERGENCY MEDICINE

## 2022-05-01 PROCEDURE — P9035 PLATELET PHERES LEUKOREDUCED: HCPCS | Performed by: PHYSICIAN ASSISTANT

## 2022-05-01 PROCEDURE — G0378 HOSPITAL OBSERVATION PER HR: HCPCS

## 2022-05-01 PROCEDURE — 82077 ASSAY SPEC XCP UR&BREATH IA: CPT | Performed by: INTERNAL MEDICINE

## 2022-05-01 PROCEDURE — 63600175 PHARM REV CODE 636 W HCPCS: Performed by: INTERNAL MEDICINE

## 2022-05-01 PROCEDURE — 25000003 PHARM REV CODE 250: Performed by: EMERGENCY MEDICINE

## 2022-05-01 RX ORDER — FOLIC ACID 1 MG/1
1 TABLET ORAL DAILY
Status: DISCONTINUED | OUTPATIENT
Start: 2022-05-01 | End: 2022-05-04 | Stop reason: HOSPADM

## 2022-05-01 RX ORDER — GLUCAGON 1 MG
1 KIT INJECTION
Status: DISCONTINUED | OUTPATIENT
Start: 2022-05-01 | End: 2022-05-04 | Stop reason: HOSPADM

## 2022-05-01 RX ORDER — PANTOPRAZOLE SODIUM 40 MG/10ML
40 INJECTION, POWDER, LYOPHILIZED, FOR SOLUTION INTRAVENOUS 2 TIMES DAILY
Status: DISCONTINUED | OUTPATIENT
Start: 2022-05-01 | End: 2022-05-01

## 2022-05-01 RX ORDER — SODIUM CHLORIDE 9 MG/ML
1000 INJECTION, SOLUTION INTRAVENOUS
Status: COMPLETED | OUTPATIENT
Start: 2022-05-01 | End: 2022-05-01

## 2022-05-01 RX ORDER — HYDROCODONE BITARTRATE AND ACETAMINOPHEN 500; 5 MG/1; MG/1
TABLET ORAL
Status: DISCONTINUED | OUTPATIENT
Start: 2022-05-01 | End: 2022-05-04

## 2022-05-01 RX ORDER — OCTREOTIDE ACETATE 50 UG/ML
50 INJECTION, SOLUTION INTRAVENOUS; SUBCUTANEOUS ONCE
Status: COMPLETED | OUTPATIENT
Start: 2022-05-01 | End: 2022-05-01

## 2022-05-01 RX ORDER — CARVEDILOL 6.25 MG/1
6.25 TABLET ORAL DAILY
Status: DISCONTINUED | OUTPATIENT
Start: 2022-05-01 | End: 2022-05-04 | Stop reason: HOSPADM

## 2022-05-01 RX ORDER — DEXTROSE MONOHYDRATE AND SODIUM CHLORIDE 5; .45 G/100ML; G/100ML
INJECTION, SOLUTION INTRAVENOUS CONTINUOUS
Status: DISCONTINUED | OUTPATIENT
Start: 2022-05-01 | End: 2022-05-02

## 2022-05-01 RX ORDER — IBUPROFEN 200 MG
16 TABLET ORAL
Status: DISCONTINUED | OUTPATIENT
Start: 2022-05-01 | End: 2022-05-04 | Stop reason: HOSPADM

## 2022-05-01 RX ORDER — IBUPROFEN 200 MG
24 TABLET ORAL
Status: DISCONTINUED | OUTPATIENT
Start: 2022-05-01 | End: 2022-05-04 | Stop reason: HOSPADM

## 2022-05-01 RX ORDER — DIAZEPAM 10 MG/2ML
10 INJECTION INTRAMUSCULAR EVERY 4 HOURS PRN
Status: DISCONTINUED | OUTPATIENT
Start: 2022-05-01 | End: 2022-05-04

## 2022-05-01 RX ORDER — ONDANSETRON 2 MG/ML
4 INJECTION INTRAMUSCULAR; INTRAVENOUS
Status: DISCONTINUED | OUTPATIENT
Start: 2022-05-01 | End: 2022-05-04 | Stop reason: HOSPADM

## 2022-05-01 RX ORDER — LANOLIN ALCOHOL/MO/W.PET/CERES
100 CREAM (GRAM) TOPICAL DAILY
Status: DISCONTINUED | OUTPATIENT
Start: 2022-05-01 | End: 2022-05-04 | Stop reason: HOSPADM

## 2022-05-01 RX ORDER — PANTOPRAZOLE SODIUM 40 MG/10ML
40 INJECTION, POWDER, LYOPHILIZED, FOR SOLUTION INTRAVENOUS 2 TIMES DAILY
Status: DISCONTINUED | OUTPATIENT
Start: 2022-05-01 | End: 2022-05-04

## 2022-05-01 RX ADMIN — LORAZEPAM 2 MG: 2 INJECTION INTRAMUSCULAR; INTRAVENOUS at 09:05

## 2022-05-01 RX ADMIN — FOLIC ACID 1 MG: 1 TABLET ORAL at 02:05

## 2022-05-01 RX ADMIN — OCTREOTIDE ACETATE 50 MCG/HR: 500 INJECTION, SOLUTION INTRAVENOUS; SUBCUTANEOUS at 05:05

## 2022-05-01 RX ADMIN — SODIUM CHLORIDE 1000 ML: 0.9 INJECTION, SOLUTION INTRAVENOUS at 08:05

## 2022-05-01 RX ADMIN — PANTOPRAZOLE SODIUM 40 MG: 40 INJECTION, POWDER, FOR SOLUTION INTRAVENOUS at 08:05

## 2022-05-01 RX ADMIN — CEFTRIAXONE 1 G: 1 INJECTION, SOLUTION INTRAVENOUS at 02:05

## 2022-05-01 RX ADMIN — LORAZEPAM 2 MG: 2 INJECTION INTRAMUSCULAR; INTRAVENOUS at 11:05

## 2022-05-01 RX ADMIN — DIAZEPAM 10 MG: 5 INJECTION, SOLUTION INTRAMUSCULAR; INTRAVENOUS at 02:05

## 2022-05-01 RX ADMIN — PROMETHAZINE HYDROCHLORIDE 12.5 MG: 25 INJECTION INTRAMUSCULAR; INTRAVENOUS at 08:05

## 2022-05-01 RX ADMIN — OCTREOTIDE ACETATE 50 MCG: 50 INJECTION, SOLUTION INTRAVENOUS; SUBCUTANEOUS at 02:05

## 2022-05-01 RX ADMIN — CARVEDILOL 6.25 MG: 6.25 TABLET, FILM COATED ORAL at 05:05

## 2022-05-01 RX ADMIN — THIAMINE HCL TAB 100 MG 100 MG: 100 TAB at 02:05

## 2022-05-01 RX ADMIN — THERA TABS 1 TABLET: TAB at 02:05

## 2022-05-01 RX ADMIN — ONDANSETRON 4 MG: 2 INJECTION INTRAMUSCULAR; INTRAVENOUS at 08:05

## 2022-05-01 RX ADMIN — RIFAXIMIN 550 MG: 550 TABLET ORAL at 08:05

## 2022-05-01 RX ADMIN — SODIUM CHLORIDE 1000 ML: 0.9 INJECTION, SOLUTION INTRAVENOUS at 09:05

## 2022-05-01 RX ADMIN — DIAZEPAM 10 MG: 5 INJECTION, SOLUTION INTRAMUSCULAR; INTRAVENOUS at 08:05

## 2022-05-01 RX ADMIN — PANTOPRAZOLE SODIUM 40 MG: 40 INJECTION, POWDER, FOR SOLUTION INTRAVENOUS at 02:05

## 2022-05-01 RX ADMIN — RIFAXIMIN 550 MG: 550 TABLET ORAL at 02:05

## 2022-05-01 RX ADMIN — DEXTROSE AND SODIUM CHLORIDE: 5; .45 INJECTION, SOLUTION INTRAVENOUS at 11:05

## 2022-05-01 NOTE — ASSESSMENT & PLAN NOTE
Hgb 8.8 on admission with last Hgb 8.7 in 3/2022.  MCV increased at 122.  -Check Fe studies  -Check B12/Folate  -See above

## 2022-05-01 NOTE — H&P
"[facility], [place], operated by [facility]  History & Physical    Patient Name: Irvin Diaz Jr.  MRN: [id number]  Patient Class: OP- Observation  Admission Date: 2022  Attending Physician: Roberto Becker MD   Primary Care Provider: Shalonda Snyder MD         Patient information was obtained from patient, past medical records and ER records.     Subjective:     Principal Problem:Hematemesis    Chief Complaint:   Chief Complaint   Patient presents with    vomiting blood     Pt c.o vomiting blood onset 4 AM. Pt states he has hx of gi bleed.  Pt c.o SOB at times and states he feels his heart beating hard.  AAO x 3 nadn skin w.d conjunctiva pink.  Bbs c.e.  pt c.o abd pain         HPI: Patient is a 46 year old male with a PMH significant for Alcohol Abuse complicated by Cirrhosis, Esophageal Varices with history of GIB, PE, HTN, HFpEF, Thrombocytopenia, JAY, CKD3 who presents with N/V and hematemesis that began early this morning.  Patient was sober from alcohol for about 2 months, but relapsed 2 weeks ago after his brother  from a stroke.  Patient is currently drinking a "bottle of gin" daily with his las drink about 2 days PTA.  Patient has had admissions for withdrawal in past.  Patient is awake and alert, and oriented.  He is still with some nausea, but emesis has resolved.  Mild epigastric pain.  Denies f/c, diarrhea.  No dysuria.  No chest pain or SOB.  Has a mild headache.  He was treated with Ativan 2mg IV x 2, NS x 2L.  CIWA at the time of my interview was 8.      Past Medical History:   Diagnosis Date    Alcoholic cirrhosis of liver     Arthritis     ATN (acute tubular necrosis)     CHF (congestive heart failure)     Diverticulitis 2020    Esophageal varices     GERD (gastroesophageal reflux disease)     GI bleed     Hip arthritis     Left    Hypertension     Liver cirrhosis     Macrocytic anemia     Pulmonary embolism 2018    Unprovoked DVT.  Stop Coumadin " due to GIB    Thrombocytopenia        Past Surgical History:   Procedure Laterality Date    ANKLE SURGERY      COLON SURGERY  2007    [facility]    COLONOSCOPY N/A 2/17/2021    Procedure: COLONOSCOPY;  Surgeon: Renea Billingsley MD;  Location: [facility];  Service: Endoscopy;  Laterality: N/A;    COLONOSCOPY W/ BIOPSIES  02/17/2021    ESOPHAGOGASTRODUODENOSCOPY N/A 7/26/2019    Procedure: EGD (ESOPHAGOGASTRODUODENOSCOPY);  Surgeon: Brian Trivedi MD;  Location: [facility];  Service: Endoscopy;  Laterality: N/A;    ESOPHAGOGASTRODUODENOSCOPY N/A 4/8/2020    Procedure: EGD (ESOPHAGOGASTRODUODENOSCOPY);  Surgeon: Donn Acuna MD;  Location: [facility];  Service: Endoscopy;  Laterality: N/A;    ESOPHAGOGASTRODUODENOSCOPY N/A 1/3/2021    Procedure: EGD (ESOPHAGOGASTRODUODENOSCOPY)- coffee ground emesis, hx varices;  Surgeon: Steve Chairez MD;  Location: [facility];  Service: Endoscopy;  Laterality: N/A;    ESOPHAGOGASTRODUODENOSCOPY N/A 5/3/2021    Procedure: EGD (ESOPHAGOGASTRODUODENOSCOPY);  Surgeon: Jeanmarie Ngo MD;  Location: [facility];  Service: Endoscopy;  Laterality: N/A;    ESOPHAGOGASTRODUODENOSCOPY N/A 7/19/2021    Procedure: ESOPHAGOGASTRODUODENOSCOPY (EGD);  Surgeon: Claudio Medeiros MD;  Location: [facility];  Service: Endoscopy;  Laterality: N/A;    ESOPHAGOGASTRODUODENOSCOPY  12/20/2021    ESOPHAGOGASTRODUODENOSCOPY N/A 12/20/2021    Procedure: EGD (ESOPHAGOGASTRODUODENOSCOPY);  Surgeon: Ajay Jackson MD;  Location: [facility];  Service: Endoscopy;  Laterality: N/A;    HERNIA REPAIR Left     Inguinal       Review of patient's allergies indicates:   Allergen Reactions    Ciprofloxacin hcl Hallucinations    Meperidine Hives     Pt medicated w/multiple medications (demerol, protonix, and zofran)  w/i 10 mins time frame prior to developing localized hives near IV site that med was administered. Pt reports he has/takes all other medications on daily  basis.     Morphine Itching    Nsaids (non-steroidal anti-inflammatory drug)      Kidney Disease    Tylenol [acetaminophen]      Hx of liver disease       No current facility-administered medications on file prior to encounter.     Current Outpatient Medications on File Prior to Encounter   Medication Sig    acamprosate (CAMPRAL) 333 mg tablet Take 666 mg by mouth 3 (three) times daily.    famotidine (PEPCID) 20 MG tablet Take 20 mg by mouth daily as needed.     folic acid (FOLVITE) 1 MG tablet Take 1 tablet (1 mg total) by mouth once daily.    NIFEdipine (PROCARDIA-XL) 30 MG (OSM) 24 hr tablet Take 1 tablet (30 mg total) by mouth once daily.    pantoprazole (PROTONIX) 40 MG tablet Take 1 tablet (40 mg total) by mouth 2 (two) times daily.    sucralfate (CARAFATE) 1 gram tablet Take 1 g by mouth 4 (four) times daily before meals and nightly.     ondansetron (ZOFRAN-ODT) 4 MG TbDL Take 1 tablet (4 mg total) by mouth every 8 (eight) hours as needed (nausea).    rifAXIMin (XIFAXAN) 550 mg Tab Take 1 tablet (550 mg total) by mouth 2 (two) times daily.    [DISCONTINUED] furosemide (LASIX) 40 MG tablet Take 0.5 tablets (20 mg total) by mouth once daily.    [DISCONTINUED] spironolactone (ALDACTONE) 50 MG tablet Take 1 tablet (50 mg total) by mouth once daily. (Patient not taking: No sig reported)     Family History       Problem Relation (Age of Onset)    Bladder Cancer Father    Breast cancer Mother    Hypertension Mother, Father    Prostate cancer Father          Tobacco Use    Smoking status: Former Smoker     Quit date:      Years since quittin.3    Smokeless tobacco: Never Used    Tobacco comment: quit 20 years ago   Substance and Sexual Activity    Alcohol use: Not Currently     Comment: freq    Drug use: Not Currently     Types: Marijuana    Sexual activity: Yes     Comment: occ     Review of Systems   Constitutional:  Negative for chills and fever.   HENT:  Negative for congestion and ear  pain.    Eyes:  Negative for pain.   Respiratory:  Negative for cough, shortness of breath and wheezing.    Cardiovascular:  Negative for chest pain and leg swelling.   Gastrointestinal:  Positive for abdominal pain, nausea and vomiting. Negative for abdominal distention, blood in stool and diarrhea.        Hematemesis   Genitourinary:  Negative for difficulty urinating and dysuria.   Musculoskeletal:  Negative for back pain and neck pain.   Skin:  Negative for rash.   Neurological:  Positive for tremors (mild) and headaches (mild frontal). Negative for dizziness and seizures.   Psychiatric/Behavioral:  Negative for agitation, behavioral problems, confusion and decreased concentration.    Objective:     Vital Signs (Most Recent):  Temp: 98.9 °F (37.2 °C) (05/01/22 1241)  Pulse: (!) 119 (05/01/22 1241)  Resp: 19 (05/01/22 1241)  BP: (!) 151/78 (05/01/22 1241)  SpO2: 98 % (05/01/22 1241)   Vital Signs (24h Range):  Temp:  [98.1 °F (36.7 °C)-99.2 °F (37.3 °C)] 98.9 °F (37.2 °C)  Pulse:  [111-124] 119  Resp:  [14-19] 19  SpO2:  [98 %-100 %] 98 %  BP: (147-163)/(73-96) 151/78        There is no height or weight on file to calculate BMI.    Physical Exam  Vitals reviewed.   Constitutional:       General: He is not in acute distress.     Appearance: He is ill-appearing (chronic). He is not toxic-appearing or diaphoretic.   HENT:      Head: Normocephalic and atraumatic.      Right Ear: External ear normal.      Left Ear: External ear normal.      Nose: Nose normal.      Mouth/Throat:      Mouth: Mucous membranes are dry.   Eyes:      General: Scleral icterus present.      Conjunctiva/sclera: Conjunctivae normal.      Pupils: Pupils are equal, round, and reactive to light.   Cardiovascular:      Rate and Rhythm: Regular rhythm. Tachycardia present.      Pulses: Normal pulses.      Heart sounds: Normal heart sounds. No murmur heard.  Pulmonary:      Effort: Pulmonary effort is normal. No respiratory distress.      Breath  sounds: Normal breath sounds. No wheezing, rhonchi or rales.   Abdominal:      General: Bowel sounds are normal.      Palpations: Abdomen is soft.      Tenderness: There is abdominal tenderness (mild epigastric).      Comments: Protuberant, but soft   Musculoskeletal:         General: Normal range of motion.      Cervical back: Normal range of motion and neck supple. No rigidity.      Right lower leg: No edema.      Left lower leg: No edema.   Skin:     General: Skin is warm and dry.      Findings: No rash.   Neurological:      General: No focal deficit present.      Mental Status: He is alert and oriented to person, place, and time. Mental status is at baseline.      Motor: No weakness.   Psychiatric:         Mood and Affect: Mood normal.         Behavior: Behavior normal.         CRANIAL NERVES     CN III, IV, VI   Pupils are equal, round, and reactive to light.     Significant Labs: All pertinent labs within the past 24 hours have been reviewed.    Significant Imaging: I have reviewed all pertinent imaging results/findings within the past 24 hours.    Assessment/Plan:     * Hematemesis  Patient presented with N/V with Coffee Ground Emesis which began early morning on the day of admission.  Hgb on admission was 8.8 (was 8.7 in 3/2022).  Patient with history of Alcohol-associated Cirrhosis.  EGD in 12/2021 with small esophageal varices and portal hypertensive gastropathy.  Vitals significant for tachycardia, but no hypotension.    Plan:  GI consult  Protonix 40mg IV q12  Ceftriaxone 1g q24  Octreotide bolus and infuision  CBC q8 with Type and Screen        Alcohol withdrawal  Ethanol level <10.  Tox screen negative on admission.  Given a total of Ativan 4mg in ED.  CIWA at time of my interview was 8.  Last drink was 48 hours PTA.  Drinking a bottle of gin daily for past 2 weeks.  History of ETOH WD, but denies seizures.  -CIWA-Ar q4 with IV Valium 10mg for CIWA >8  -Thiamine/Folic Acid/MVI  -IV D5 1/2 NS given  ketoacidosis and hypoglycemia  -Fall and Seizure precautions      Alcoholic ketoacidosis  Labs on admission with AG 33, 1+ ketones on UA.  Glucose 51.  -Start D5 1/2 NS  -Follow Glucose q4 for now      CKD (chronic kidney disease) stage 3, GFR 30-59 ml/min  Creatinine 2.0 on admission with baseline around 1.6-2.0 over past year.  -Renal Dose Medications  -Avoid Nephrotoxins      Macrocytic anemia  Hgb 8.8 on admission with last Hgb 8.7 in 3/2022.  MCV increased at 122.  -Check Fe studies  -Check B12/Folate  -See above      Alcoholic cirrhosis of liver  Followed by [facility] and last seen in 12/2021.  CT A/P done in 3/2022 with findings of cirrhosis and mild abdominal ascites as described essentially stable.  Last EGD done in 12/2021 with small esophageal varices and portal hypertensive gastropathy as above.  Labs on admission with INR 1.3, T Bili of 5.6, AST/ALT of 95/31.  No evidence of significant encephalopathy at this time.  MELD-Na on admission was 23 (7-10% 90-day mortality).  Home Meds:  Rifaximin 500mg bid  -Continue Rifaximin      Thrombocytopenia  Chronic and stable at 51K.    -Monitor and keep >50k with evidence of bleeding        VTE Risk Mitigation (From admission, onward)         Ordered     IP VTE HIGH RISK PATIENT  Once         05/01/22 1158     Place sequential compression device  Until discontinued         05/01/22 1158                   Roberto Becker MD  Department of Hospital Medicine   [facility] ([facility])

## 2022-05-01 NOTE — ED NOTES
Called lab about unresulted blood specimens.  Per lab, blood has not been received.  OK to redraw and place pt labels on tubes

## 2022-05-01 NOTE — ASSESSMENT & PLAN NOTE
Labs on admission with AG 33, 1+ ketones on UA.  Glucose 51.  -Start D5 1/2 NS  -Follow Glucose q4 for now

## 2022-05-01 NOTE — ASSESSMENT & PLAN NOTE
Creatinine 2.0 on admission with baseline around 1.6-2.0 over past year.  -Renal Dose Medications  -Avoid Nephrotoxins

## 2022-05-01 NOTE — ED PROVIDER NOTES
SCRIBE #1 NOTE: IJarocho am scribing for, and in the presence of,  Pancho Lawrence DO. I have scribed the following portions of the note - Other sections scribed: HPI, ROS, PE.         Source of History:  Patient    Chief complaint:  vomiting blood (Pt c.o vomiting blood onset 4 AM. Pt states he has hx of gi bleed.  Pt c.o SOB at times and states he feels his heart beating hard.  AAO x 3 nadn skin w.d conjunctiva pink.  Bbs c.e.  pt c.o abd pain )      HPI:  Irvin Diaz Jr. is a 46 y.o. male with alcoholic cirrhosis with multiple prior GI bleeds presenting with bloody emesis over the past 3.5 hours. Patient recalls relapsing on alcohol use two days ago due to the recent death of his brother, and has not eaten much since. He does endorse adequate hydration. This morning he vomited coffee ground emesis. This progressively became more brown throughout the morning. Other symptoms include generalized weakness and shortness of breath. He denies any black or bloody stool. PMHx of CHF. He is fully vaccinated against COVID-19    This is the extent to the patients complaints today here in the emergency department.    ROS: As per HPI and below:  Constitutional: No fever.  No chills.  Eyes: No visual changes.  ENT: No sore throat. No ear pain    Cardiovascular: No chest pain.  Respiratory: No shortness of breath.  GI: Positive for nausea, vomiting, and hematemesis. No black or bloody stool.  Genitourinary: No abnormal urination.  Neurologic: No headache. No focal weakness.  No numbness.  MSK: no back pain.  Integument: No rashes or lesions.  Hematologic: No easy bruising.  Endocrine: No excessive thirst or urination.    Review of patient's allergies indicates:   Allergen Reactions    Ciprofloxacin hcl Hallucinations    Meperidine Hives     Pt medicated w/multiple medications (demerol, protonix, and zofran)  w/i 10 mins time frame prior to developing localized hives near IV site that med was administered. Pt  reports he has/takes all other medications on daily basis.     Morphine Itching    Nsaids (non-steroidal anti-inflammatory drug)      Kidney Disease    Tylenol [acetaminophen]      Hx of liver disease       PMH:  As per HPI and below:  Past Medical History:   Diagnosis Date    Alcoholic cirrhosis of liver     Arthritis     ATN (acute tubular necrosis)     CHF (congestive heart failure)     Diverticulitis 01/2020    Esophageal varices     GERD (gastroesophageal reflux disease)     GI bleed     Hip arthritis     Left    Hypertension     Liver cirrhosis     Macrocytic anemia     Pulmonary embolism 08/2018    Unprovoked DVT.  Stop Coumadin due to GIB    Thrombocytopenia      Past Surgical History:   Procedure Laterality Date    ANKLE SURGERY      COLON SURGERY  2007    [facility]    COLONOSCOPY N/A 2/17/2021    Procedure: COLONOSCOPY;  Surgeon: Renea Billingsley MD;  Location: [facility];  Service: Endoscopy;  Laterality: N/A;    COLONOSCOPY W/ BIOPSIES  02/17/2021    ESOPHAGOGASTRODUODENOSCOPY N/A 7/26/2019    Procedure: EGD (ESOPHAGOGASTRODUODENOSCOPY);  Surgeon: Brian Trivedi MD;  Location: [facility];  Service: Endoscopy;  Laterality: N/A;    ESOPHAGOGASTRODUODENOSCOPY N/A 4/8/2020    Procedure: EGD (ESOPHAGOGASTRODUODENOSCOPY);  Surgeon: Donn Acuna MD;  Location: [facility];  Service: Endoscopy;  Laterality: N/A;    ESOPHAGOGASTRODUODENOSCOPY N/A 1/3/2021    Procedure: EGD (ESOPHAGOGASTRODUODENOSCOPY)- coffee ground emesis, hx varices;  Surgeon: Steve Chairez MD;  Location: [facility];  Service: Endoscopy;  Laterality: N/A;    ESOPHAGOGASTRODUODENOSCOPY N/A 5/3/2021    Procedure: EGD (ESOPHAGOGASTRODUODENOSCOPY);  Surgeon: Jeanmarie Ngo MD;  Location: [facility];  Service: Endoscopy;  Laterality: N/A;    ESOPHAGOGASTRODUODENOSCOPY N/A 7/19/2021    Procedure: ESOPHAGOGASTRODUODENOSCOPY (EGD);  Surgeon: Claudio Medeiros MD;  Location: [facility];   Service: Endoscopy;  Laterality: N/A;    ESOPHAGOGASTRODUODENOSCOPY  2021    ESOPHAGOGASTRODUODENOSCOPY N/A 2021    Procedure: EGD (ESOPHAGOGASTRODUODENOSCOPY);  Surgeon: Ajay Jackson MD;  Location: [facility];  Service: Endoscopy;  Laterality: N/A;    HERNIA REPAIR Left     Inguinal       Social History     Tobacco Use    Smoking status: Former Smoker     Quit date:      Years since quittin.3    Smokeless tobacco: Never Used    Tobacco comment: quit 20 years ago   Substance Use Topics    Alcohol use: Not Currently     Comment: freq    Drug use: Not Currently     Types: Marijuana       Physical Exam:    BP (!) 147/83   Pulse (!) 119   Temp 99.2 °F (37.3 °C) (Oral)   Resp 15   SpO2 100%   Nursing note and vital signs reviewed.  Constitutional: Uncomfortable and ill appearing.  Nontoxic  Eyes: No conjunctival injection.  Extraocular muscles are intact.  ENT: Oropharynx clear.  Normal phonation.  Cardiovascular: Tachycardic at regular rhythm. Systolic murmur. No gallops. No rubs  Respiratory: Clear to auscultation bilaterally.  Good air movement.  No wheezes.  No rhonchi. No rales. No accessory muscle use.  Abdomen:  Soft and non-tender. No distension. Negative Guerra's. Non-peritoneal. Holding emesis bag with brownish vomit.  Musculoskeletal: Good range of motion all joints.  No deformities.  Neck supple.  No meningismus.  Skin: No rashes seen.  Good turgor.  No abrasions.  No ecchymoses.  Neuro: alert and oriented x3,  no focal neurological deficits.  Psych: Appropriate, conversant    Critical Care    Date/Time: 2022 7:50 AM  Performed by: Pancho Lawrence DO  Authorized by: Pancho Lawrence DO   Direct patient critical care time: 25 minutes  Additional history critical care time: 5 minutes  Ordering / reviewing critical care time: 6 minutes  Documentation critical care time: 6 minutes  Consulting other physicians critical care time: 5 minutes  Total critical care time  (exclusive of procedural time) : 47 minutes  Critical care time was exclusive of separately billable procedures and treating other patients and teaching time.  Critical care was necessary to treat or prevent imminent or life-threatening deterioration of the following conditions: metabolic crisis.  Critical care was time spent personally by me on the following activities: interpretation of cardiac output measurements, evaluation of patient's response to treatment, examination of patient, obtaining history from patient or surrogate, ordering and performing treatments and interventions, ordering and review of laboratory studies, ordering and review of radiographic studies, pulse oximetry, re-evaluation of patient's condition and review of old charts.          Labs that have been ordered have been independently reviewed and interpreted by myself.    I decided to obtain the patient's medical records.  Summary of Medical Records:  12/20/2021 upper GI showing small esophageal varices.  No active bleeding.    03/07/2022-03/12/2022 2 day hospital course admitted for acute on chronic renal failure colitis.  Treated with IV fluids and antibiotics.        MDM/ Differential Dx:   46 y.o. male with significant past medical history including, renal failure colitis, GI bleed.  Concern of GI bleed today.  Relapsed with alcohol 2 weeks ago.  He is tachycardic.  Will produce IV fluids.  Will get labs give antiemetics is actively vomiting and observed.    ED Course as of 05/01/22 1033   Sun May 01, 2022   0801 EKG 12-lead  EKG independently interpreted by myself shows sinus tachycardia rate of 116, normal intervals, narrow QRS, no acute ST T wave abnormalities.  Compared to an EKG on March 11, 2022 shows no significant change. [SM]   0847 WBC: 8.08 [SM]   0847 Hemoglobin(!): 8.8  Baseline for the patient. [SM]   0929 Creatinine(!): 2.0  Improved from baseline at approximately 2.9. [SM]   0929 Sodium: 142 [SM]   0929 Potassium: 4.2 [SM]    0935 Glucose(!): 51 [SM]   0935 CO2(!): 10 [SM]   0935 Anion Gap(!): 33 [SM]   0936 Electrolytes consistent with a metabolic acidosis picture with significantly elevated anion gap.  Give IV fluids and  admit to Hospital Medicine. Likely alcholic ketoacidosis. []   1031 I spoke with Hospital Medicine and will admit the patient to Dr. Becker service.  Updated the patient answered all questions. []      ED Course User Index  [] Pancho Lawrence DO              Scribe Attestation:   Scribe #1: I performed the above scribed service and the documentation accurately describes the services I performed. I attest to the accuracy of the note.     Physician Attestation for Scribe: I, Dr Pancho Lawrence, reviewed documentation as scribed in my presence, which is both accurate and complete.    Diagnostic Impression:    1. Alcoholic ketoacidosis    2. GI bleed    3. Alcohol withdrawal syndrome with complication         ED Disposition Condition    Observation                 Pancho Lawrence DO  05/01/22 1033

## 2022-05-01 NOTE — ASSESSMENT & PLAN NOTE
Patient presented with N/V with Coffee Ground Emesis which began early morning on the day of admission.  Hgb on admission was 8.8 (was 8.7 in 3/2022).  Patient with history of Alcohol-associated Cirrhosis.  EGD in 12/2021 with small esophageal varices and portal hypertensive gastropathy.  Vitals significant for tachycardia, but no hypotension.    Plan:  GI consult  Protonix 40mg IV q12  Ceftriaxone 1g q24  Octreotide bolus and infuision  CBC q8 with Type and Screen

## 2022-05-01 NOTE — ED NOTES
MD updated on low CBG.  Per MD, give food and encourage pt to eat.  Pt provided turkey sandwich.

## 2022-05-01 NOTE — ASSESSMENT & PLAN NOTE
Followed by [facility] and last seen in 12/2021.  CT A/P done in 3/2022 with findings of cirrhosis and mild abdominal ascites as described essentially stable.  Last EGD done in 12/2021 with small esophageal varices and portal hypertensive gastropathy as above.  Labs on admission with INR 1.3, T Bili of 5.6, AST/ALT of 95/31.  No evidence of significant encephalopathy at this time.  MELD-Na on admission was 23 (7-10% 90-day mortality).  Home Meds:  Rifaximin 500mg bid  -Continue Rifaximin

## 2022-05-01 NOTE — ASSESSMENT & PLAN NOTE
Ethanol level <10.  Tox screen negative on admission.  Given a total of Ativan 4mg in ED.  CIWA at time of my interview was 8.  Last drink was 48 hours PTA.  Drinking a bottle of gin daily for past 2 weeks.  History of ETOH WD, but denies seizures.  -CIWA-Ar q4 with IV Valium 10mg for CIWA >8  -Thiamine/Folic Acid/MVI  -IV D5 1/2 NS given ketoacidosis and hypoglycemia  -Fall and Seizure precautions

## 2022-05-01 NOTE — PLAN OF CARE
05/01/22 1430   Discharge Assessment   Assessment Type Discharge Planning Assessment   Confirmed/corrected address, phone number and insurance Yes   Confirmed Demographics Correct on Facesheet   Source of Information patient   Does patient/caregiver understand observation status Yes   Communicated RADHA with patient/caregiver Yes   Lives With sibling(s)   Do you expect to return to your current living situation? Yes   Do you have help at home or someone to help you manage your care at home? Yes   Who are your caregiver(s) and their phone number(s)? Family   Prior to hospitilization cognitive status: Alert/Oriented   Current cognitive status: Alert/Oriented   Walking or Climbing Stairs Difficulty ambulation difficulty, requires equipment   Mobility Management Cane   Dressing/Bathing Difficulty none   Equipment Currently Used at Home cane, straight   Readmission within 30 days? No   Patient currently being followed by outpatient case management? No   Do you currently have service(s) that help you manage your care at home? No   Do you take prescription medications? Yes   Who is going to help you get home at discharge? Family   Are you on dialysis? No   Do you take coumadin? No   Discharge Plan A Home   Discharge Plan B Home with family   Discharge Barriers Identified None

## 2022-05-01 NOTE — HPI
"Per Dr. Mejia. REJI Becker:    "Patient is a 46 year old male with a PMH significant for Alcohol Abuse complicated by Cirrhosis, Esophageal Varices with history of GIB, PE, HTN, HFpEF, Thrombocytopenia, JAY, CKD3 who presents with N/V and hematemesis that began early this morning.  Patient was sober from alcohol for about 2 months, but relapsed 2 weeks ago after his brother  from a stroke.  Patient is currently drinking a "bottle of gin" daily with his las drink about 2 days PTA.  Patient has had admissions for withdrawal in past.  Patient is awake and alert, and oriented.  He is still with some nausea, but emesis has resolved.  Mild epigastric pain.  Denies f/c, diarrhea.  No dysuria.  No chest pain or SOB.  Has a mild headache.  He was treated with Ativan 2mg IV x 2, NS x 2L.  CIWA at the time of my interview was 8."  "

## 2022-05-01 NOTE — ED TRIAGE NOTES
Chief Complaint   Patient presents with    vomiting blood     Pt c.o vomiting blood onset 4 AM. Pt states he has hx of gi bleed.  Pt c.o SOB at times and states he feels his heart beating hard.  AAO x 3 nadn skin w.d conjunctiva pink.  Bbs c.e.  pt c.o abd pain      Pt presents to the ED for evaluation of bloody emesis x 4 AM today.  Hx esophageal varices and has 4 bands from 6 months ago.  Pt has dark red spots on shorts and vomited dark brown emesis in lobby.  Hx cirrhosis.  Pt queried; pt denies further complaints at this time.

## 2022-05-01 NOTE — SUBJECTIVE & OBJECTIVE
Past Medical History:   Diagnosis Date    Alcoholic cirrhosis of liver     Arthritis     ATN (acute tubular necrosis)     CHF (congestive heart failure)     Diverticulitis 01/2020    Esophageal varices     GERD (gastroesophageal reflux disease)     GI bleed     Hip arthritis     Left    Hypertension     Liver cirrhosis     Macrocytic anemia     Pulmonary embolism 08/2018    Unprovoked DVT.  Stop Coumadin due to GIB    Thrombocytopenia        Past Surgical History:   Procedure Laterality Date    ANKLE SURGERY      COLON SURGERY  2007    COLONOSCOPY  09/05/2019    [facility]    COLONOSCOPY N/A 2/17/2021    Procedure: COLONOSCOPY;  Surgeon: Renea Billingsley MD;  Location: [facility];  Service: Endoscopy;  Laterality: N/A;    COLONOSCOPY W/ BIOPSIES  02/17/2021    ESOPHAGOGASTRODUODENOSCOPY N/A 7/26/2019    Procedure: EGD (ESOPHAGOGASTRODUODENOSCOPY);  Surgeon: Brian Trivedi MD;  Location: [facility];  Service: Endoscopy;  Laterality: N/A;    ESOPHAGOGASTRODUODENOSCOPY N/A 4/8/2020    Procedure: EGD (ESOPHAGOGASTRODUODENOSCOPY);  Surgeon: Donn Acuna MD;  Location: [facility];  Service: Endoscopy;  Laterality: N/A;    ESOPHAGOGASTRODUODENOSCOPY N/A 1/3/2021    Procedure: EGD (ESOPHAGOGASTRODUODENOSCOPY)- coffee ground emesis, hx varices;  Surgeon: Steve Chairez MD;  Location: [facility];  Service: Endoscopy;  Laterality: N/A;    ESOPHAGOGASTRODUODENOSCOPY N/A 5/3/2021    Procedure: EGD (ESOPHAGOGASTRODUODENOSCOPY);  Surgeon: Jeanmarie Ngo MD;  Location: [facility];  Service: Endoscopy;  Laterality: N/A;    ESOPHAGOGASTRODUODENOSCOPY N/A 7/19/2021    Procedure: ESOPHAGOGASTRODUODENOSCOPY (EGD);  Surgeon: Claudio Medeiros MD;  Location: [facility];  Service: Endoscopy;  Laterality: N/A;    ESOPHAGOGASTRODUODENOSCOPY  12/20/2021    ESOPHAGOGASTRODUODENOSCOPY N/A 12/20/2021    Procedure: EGD (ESOPHAGOGASTRODUODENOSCOPY);  Surgeon: Ajay Jackson MD;  Location: [facility];  Service: Endoscopy;  Laterality: N/A;     HERNIA REPAIR Left     Inguinal       Review of patient's allergies indicates:   Allergen Reactions    Ciprofloxacin hcl Hallucinations    Meperidine Hives     Pt medicated w/multiple medications (demerol, protonix, and zofran)  w/i 10 mins time frame prior to developing localized hives near IV site that med was administered. Pt reports he has/takes all other medications on daily basis.     Morphine Itching    Nsaids (non-steroidal anti-inflammatory drug)      Kidney Disease    Tylenol [acetaminophen]      Hx of liver disease       No current facility-administered medications on file prior to encounter.     Current Outpatient Medications on File Prior to Encounter   Medication Sig    acamprosate (CAMPRAL) 333 mg tablet Take 666 mg by mouth 3 (three) times daily.    famotidine (PEPCID) 20 MG tablet Take 20 mg by mouth daily as needed.     folic acid (FOLVITE) 1 MG tablet Take 1 tablet (1 mg total) by mouth once daily.    NIFEdipine (PROCARDIA-XL) 30 MG (OSM) 24 hr tablet Take 1 tablet (30 mg total) by mouth once daily.    pantoprazole (PROTONIX) 40 MG tablet Take 1 tablet (40 mg total) by mouth 2 (two) times daily.    sucralfate (CARAFATE) 1 gram tablet Take 1 g by mouth 4 (four) times daily before meals and nightly.     ondansetron (ZOFRAN-ODT) 4 MG TbDL Take 1 tablet (4 mg total) by mouth every 8 (eight) hours as needed (nausea).    rifAXIMin (XIFAXAN) 550 mg Tab Take 1 tablet (550 mg total) by mouth 2 (two) times daily.    [DISCONTINUED] furosemide (LASIX) 40 MG tablet Take 0.5 tablets (20 mg total) by mouth once daily.    [DISCONTINUED] spironolactone (ALDACTONE) 50 MG tablet Take 1 tablet (50 mg total) by mouth once daily. (Patient not taking: No sig reported)     Family History       Problem Relation (Age of Onset)    Bladder Cancer Father    Breast cancer Mother    Hypertension Mother, Father    Prostate cancer Father          Tobacco Use    Smoking status: Former Smoker     Quit date: 2000     Years since  quittin.3    Smokeless tobacco: Never Used    Tobacco comment: quit 20 years ago   Substance and Sexual Activity    Alcohol use: Not Currently     Comment: freq    Drug use: Not Currently     Types: Marijuana    Sexual activity: Yes     Comment: occ     Review of Systems   Constitutional:  Negative for chills and fever.   HENT:  Negative for congestion and ear pain.    Eyes:  Negative for pain.   Respiratory:  Negative for cough, shortness of breath and wheezing.    Cardiovascular:  Negative for chest pain and leg swelling.   Gastrointestinal:  Positive for abdominal pain, nausea and vomiting. Negative for abdominal distention, blood in stool and diarrhea.        Hematemesis   Genitourinary:  Negative for difficulty urinating and dysuria.   Musculoskeletal:  Negative for back pain and neck pain.   Skin:  Negative for rash.   Neurological:  Positive for tremors (mild) and headaches (mild frontal). Negative for dizziness and seizures.   Psychiatric/Behavioral:  Negative for agitation, behavioral problems, confusion and decreased concentration.    Objective:     Vital Signs (Most Recent):  Temp: 98.9 °F (37.2 °C) (22 1241)  Pulse: (!) 119 (22 1241)  Resp: 19 (22 1241)  BP: (!) 151/78 (22 1241)  SpO2: 98 % (22 1241)   Vital Signs (24h Range):  Temp:  [98.1 °F (36.7 °C)-99.2 °F (37.3 °C)] 98.9 °F (37.2 °C)  Pulse:  [111-124] 119  Resp:  [14-19] 19  SpO2:  [98 %-100 %] 98 %  BP: (147-163)/(73-96) 151/78        There is no height or weight on file to calculate BMI.    Physical Exam  Vitals reviewed.   Constitutional:       General: He is not in acute distress.     Appearance: He is ill-appearing (chronic). He is not toxic-appearing or diaphoretic.   HENT:      Head: Normocephalic and atraumatic.      Right Ear: External ear normal.      Left Ear: External ear normal.      Nose: Nose normal.      Mouth/Throat:      Mouth: Mucous membranes are dry.   Eyes:      General: Scleral icterus  present.      Conjunctiva/sclera: Conjunctivae normal.      Pupils: Pupils are equal, round, and reactive to light.   Cardiovascular:      Rate and Rhythm: Regular rhythm. Tachycardia present.      Pulses: Normal pulses.      Heart sounds: Normal heart sounds. No murmur heard.  Pulmonary:      Effort: Pulmonary effort is normal. No respiratory distress.      Breath sounds: Normal breath sounds. No wheezing, rhonchi or rales.   Abdominal:      General: Bowel sounds are normal.      Palpations: Abdomen is soft.      Tenderness: There is abdominal tenderness (mild epigastric).      Comments: Protuberant, but soft   Musculoskeletal:         General: Normal range of motion.      Cervical back: Normal range of motion and neck supple. No rigidity.      Right lower leg: No edema.      Left lower leg: No edema.   Skin:     General: Skin is warm and dry.      Findings: No rash.   Neurological:      General: No focal deficit present.      Mental Status: He is alert and oriented to person, place, and time. Mental status is at baseline.      Motor: No weakness.   Psychiatric:         Mood and Affect: Mood normal.         Behavior: Behavior normal.         CRANIAL NERVES     CN III, IV, VI   Pupils are equal, round, and reactive to light.     Significant Labs: All pertinent labs within the past 24 hours have been reviewed.    Significant Imaging: I have reviewed all pertinent imaging results/findings within the past 24 hours.

## 2022-05-02 ENCOUNTER — TELEPHONE (OUTPATIENT)
Dept: INTERNAL MEDICINE | Facility: CLINIC | Age: 47
End: 2022-05-02
Payer: MEDICARE

## 2022-05-02 LAB
ALBUMIN SERPL BCP-MCNC: 3.1 G/DL (ref 3.5–5.2)
ALP SERPL-CCNC: 78 U/L (ref 55–135)
ALT SERPL W/O P-5'-P-CCNC: 33 U/L (ref 10–44)
ANION GAP SERPL CALC-SCNC: 10 MMOL/L (ref 8–16)
AST SERPL-CCNC: 108 U/L (ref 10–40)
BASOPHILS # BLD AUTO: 0.02 K/UL (ref 0–0.2)
BASOPHILS NFR BLD: 0.5 % (ref 0–1.9)
BILIRUB SERPL-MCNC: 5.3 MG/DL (ref 0.1–1)
BUN SERPL-MCNC: 24 MG/DL (ref 6–20)
CALCIUM SERPL-MCNC: 9 MG/DL (ref 8.7–10.5)
CHLORIDE SERPL-SCNC: 101 MMOL/L (ref 95–110)
CO2 SERPL-SCNC: 25 MMOL/L (ref 23–29)
CREAT SERPL-MCNC: 2.3 MG/DL (ref 0.5–1.4)
DIFFERENTIAL METHOD: ABNORMAL
EOSINOPHIL # BLD AUTO: 0 K/UL (ref 0–0.5)
EOSINOPHIL NFR BLD: 0.7 % (ref 0–8)
ERYTHROCYTE [DISTWIDTH] IN BLOOD BY AUTOMATED COUNT: 15.6 % (ref 11.5–14.5)
EST. GFR  (AFRICAN AMERICAN): 38 ML/MIN/1.73 M^2
EST. GFR  (NON AFRICAN AMERICAN): 33 ML/MIN/1.73 M^2
FOLATE SERPL-MCNC: 7.2 NG/ML (ref 4–24)
GLUCOSE SERPL-MCNC: 173 MG/DL (ref 70–110)
HCT VFR BLD AUTO: 23.8 % (ref 40–54)
HGB BLD-MCNC: 8 G/DL (ref 14–18)
IMM GRANULOCYTES # BLD AUTO: 0.01 K/UL (ref 0–0.04)
IMM GRANULOCYTES NFR BLD AUTO: 0.2 % (ref 0–0.5)
LYMPHOCYTES # BLD AUTO: 0.6 K/UL (ref 1–4.8)
LYMPHOCYTES NFR BLD: 15.1 % (ref 18–48)
MCH RBC QN AUTO: 38.3 PG (ref 27–31)
MCHC RBC AUTO-ENTMCNC: 33.6 G/DL (ref 32–36)
MCV RBC AUTO: 114 FL (ref 82–98)
MONOCYTES # BLD AUTO: 0.5 K/UL (ref 0.3–1)
MONOCYTES NFR BLD: 11.4 % (ref 4–15)
NEUTROPHILS # BLD AUTO: 2.9 K/UL (ref 1.8–7.7)
NEUTROPHILS NFR BLD: 72.1 % (ref 38–73)
NRBC BLD-RTO: 0 /100 WBC
PLATELET # BLD AUTO: 43 K/UL (ref 150–450)
PMV BLD AUTO: 11.6 FL (ref 9.2–12.9)
POCT GLUCOSE: 192 MG/DL (ref 70–110)
POTASSIUM SERPL-SCNC: 3.8 MMOL/L (ref 3.5–5.1)
PROT SERPL-MCNC: 7.6 G/DL (ref 6–8.4)
RBC # BLD AUTO: 2.09 M/UL (ref 4.6–6.2)
SODIUM SERPL-SCNC: 136 MMOL/L (ref 136–145)
VIT B12 SERPL-MCNC: 718 PG/ML (ref 210–950)
WBC # BLD AUTO: 4.04 K/UL (ref 3.9–12.7)

## 2022-05-02 PROCEDURE — 80053 COMPREHEN METABOLIC PANEL: CPT | Performed by: INTERNAL MEDICINE

## 2022-05-02 PROCEDURE — 25000003 PHARM REV CODE 250: Performed by: INTERNAL MEDICINE

## 2022-05-02 PROCEDURE — 99226 PR SUBSEQUENT OBSERVATION CARE,LEVEL III: ICD-10-PCS | Mod: ,,, | Performed by: INTERNAL MEDICINE

## 2022-05-02 PROCEDURE — 85025 COMPLETE CBC W/AUTO DIFF WBC: CPT | Performed by: INTERNAL MEDICINE

## 2022-05-02 PROCEDURE — 21400001 HC TELEMETRY ROOM

## 2022-05-02 PROCEDURE — C9113 INJ PANTOPRAZOLE SODIUM, VIA: HCPCS | Performed by: INTERNAL MEDICINE

## 2022-05-02 PROCEDURE — 99226 PR SUBSEQUENT OBSERVATION CARE,LEVEL III: CPT | Mod: ,,, | Performed by: INTERNAL MEDICINE

## 2022-05-02 PROCEDURE — 36415 COLL VENOUS BLD VENIPUNCTURE: CPT | Performed by: INTERNAL MEDICINE

## 2022-05-02 PROCEDURE — 63600175 PHARM REV CODE 636 W HCPCS: Performed by: INTERNAL MEDICINE

## 2022-05-02 PROCEDURE — S5010 5% DEXTROSE AND 0.45% SALINE: HCPCS | Performed by: INTERNAL MEDICINE

## 2022-05-02 RX ORDER — SODIUM CHLORIDE, SODIUM LACTATE, POTASSIUM CHLORIDE, CALCIUM CHLORIDE 600; 310; 30; 20 MG/100ML; MG/100ML; MG/100ML; MG/100ML
INJECTION, SOLUTION INTRAVENOUS CONTINUOUS
Status: DISCONTINUED | OUTPATIENT
Start: 2022-05-02 | End: 2022-05-04

## 2022-05-02 RX ADMIN — RIFAXIMIN 550 MG: 550 TABLET ORAL at 09:05

## 2022-05-02 RX ADMIN — CARVEDILOL 6.25 MG: 6.25 TABLET, FILM COATED ORAL at 05:05

## 2022-05-02 RX ADMIN — CEFTRIAXONE 1 G: 1 INJECTION, SOLUTION INTRAVENOUS at 12:05

## 2022-05-02 RX ADMIN — OCTREOTIDE ACETATE 50 MCG/HR: 500 INJECTION, SOLUTION INTRAVENOUS; SUBCUTANEOUS at 04:05

## 2022-05-02 RX ADMIN — PANTOPRAZOLE SODIUM 40 MG: 40 INJECTION, POWDER, FOR SOLUTION INTRAVENOUS at 09:05

## 2022-05-02 RX ADMIN — PANTOPRAZOLE SODIUM 40 MG: 40 INJECTION, POWDER, FOR SOLUTION INTRAVENOUS at 08:05

## 2022-05-02 RX ADMIN — SODIUM CHLORIDE, SODIUM LACTATE, POTASSIUM CHLORIDE, AND CALCIUM CHLORIDE: .6; .31; .03; .02 INJECTION, SOLUTION INTRAVENOUS at 12:05

## 2022-05-02 RX ADMIN — THERA TABS 1 TABLET: TAB at 08:05

## 2022-05-02 RX ADMIN — FOLIC ACID 1 MG: 1 TABLET ORAL at 08:05

## 2022-05-02 RX ADMIN — DIAZEPAM 10 MG: 5 INJECTION, SOLUTION INTRAMUSCULAR; INTRAVENOUS at 04:05

## 2022-05-02 RX ADMIN — SODIUM CHLORIDE, SODIUM LACTATE, POTASSIUM CHLORIDE, AND CALCIUM CHLORIDE: .6; .31; .03; .02 INJECTION, SOLUTION INTRAVENOUS at 11:05

## 2022-05-02 RX ADMIN — THIAMINE HCL TAB 100 MG 100 MG: 100 TAB at 08:05

## 2022-05-02 RX ADMIN — DEXTROSE AND SODIUM CHLORIDE: 5; .45 INJECTION, SOLUTION INTRAVENOUS at 04:05

## 2022-05-02 NOTE — PLAN OF CARE
Patient A&OX4. CIWA Q4 see flowsheet. Patient in bed resting. Bed alarm on. Patient call light within reach. Side railsX2.     Problem: Adult Inpatient Plan of Care  Goal: Plan of Care Review  Outcome: Ongoing, Progressing  Goal: Patient-Specific Goal (Individualized)  Outcome: Ongoing, Progressing     Problem: Adjustment to Illness (Gastrointestinal Bleeding)  Goal: Optimal Coping with Acute Illness  Outcome: Ongoing, Progressing

## 2022-05-02 NOTE — ASSESSMENT & PLAN NOTE
Hemoglobin stable and EGD revealed not revealed esophageal varices but without evidence of acute bleeding.  Starting oral diet.  Will advance as tolerated.

## 2022-05-02 NOTE — CONSULTS
Gastroenterology Consult    5/2/2022  2:45 PM    Consulting Physician:  Donn Acuna MD    Primary Care Provider: Shalonda Snyder MD    Reason for consultation: GI bleed    HPI:  Irvin Diaz Jr. is a 46 y.o. male who presents with with complaints of nausea/vomiting and hematemesis that started AM of presentation.  Long standing history of ETOH cirrhosis with recent relapse.  Known history of DTs.  He had an EGD on 12/21/21showing small esophageal varices, moderate portal hypertensive gastropathy.  He also has a known history of gastric ulcer seen on EGD 7/19/21.  No ongoing NSAID abuse.  Denies any bleeding since admission.  He was placed on Octreotide, PPI and empiric abx.     Past Medical History:  Past Medical History:   Diagnosis Date    Alcoholic cirrhosis of liver     Arthritis     ATN (acute tubular necrosis)     CHF (congestive heart failure)     Diverticulitis 01/2020    Esophageal varices     GERD (gastroesophageal reflux disease)     GI bleed     Hip arthritis     Left    Hypertension     Liver cirrhosis     Macrocytic anemia     Pulmonary embolism 08/2018    Unprovoked DVT.  Stop Coumadin due to GIB    Thrombocytopenia        Allergies:   Review of patient's allergies indicates:   Allergen Reactions    Ciprofloxacin hcl Hallucinations    Meperidine Hives     Pt medicated w/multiple medications (demerol, protonix, and zofran)  w/i 10 mins time frame prior to developing localized hives near IV site that med was administered. Pt reports he has/takes all other medications on daily basis.     Morphine Itching    Nsaids (non-steroidal anti-inflammatory drug)      Kidney Disease    Tylenol [acetaminophen]      Hx of liver disease       Current Medications:  Medications Prior to Admission   Medication Sig Dispense Refill Last Dose    acamprosate (CAMPRAL) 333 mg tablet Take 666 mg by mouth 3 (three) times daily.   Past Week    famotidine (PEPCID) 20 MG tablet Take 20 mg  by mouth daily as needed.    Past Week    folic acid (FOLVITE) 1 MG tablet Take 1 tablet (1 mg total) by mouth once daily. 30 tablet 0 Past Week    NIFEdipine (PROCARDIA-XL) 30 MG (OSM) 24 hr tablet Take 1 tablet (30 mg total) by mouth once daily. 30 tablet 11 Past Week    pantoprazole (PROTONIX) 40 MG tablet Take 1 tablet (40 mg total) by mouth 2 (two) times daily. 60 tablet 1 Past Week    sucralfate (CARAFATE) 1 gram tablet Take 1 g by mouth 4 (four) times daily before meals and nightly.    Past Week    ondansetron (ZOFRAN-ODT) 4 MG TbDL Take 1 tablet (4 mg total) by mouth every 8 (eight) hours as needed (nausea). 20 tablet 0     rifAXIMin (XIFAXAN) 550 mg Tab Take 1 tablet (550 mg total) by mouth 2 (two) times daily. 60 tablet 5          Social History:  Social History     Socioeconomic History    Marital status:    Tobacco Use    Smoking status: Former Smoker     Quit date:      Years since quittin.3    Smokeless tobacco: Never Used    Tobacco comment: quit 20 years ago   Substance and Sexual Activity    Alcohol use: Not Currently     Comment: freq    Drug use: Not Currently     Types: Marijuana    Sexual activity: Yes     Comment: occ     Social Determinants of Health     Financial Resource Strain: Low Risk     Difficulty of Paying Living Expenses: Not hard at all   Food Insecurity: No Food Insecurity    Worried About Running Out of Food in the Last Year: Never true    Ran Out of Food in the Last Year: Never true   Transportation Needs: No Transportation Needs    Lack of Transportation (Medical): No    Lack of Transportation (Non-Medical): No   Physical Activity: Inactive    Days of Exercise per Week: 0 days    Minutes of Exercise per Session: 0 min   Stress: No Stress Concern Present    Feeling of Stress : Not at all   Social Connections: Unknown    Frequency of Communication with Friends and Family: More than three times a week    Frequency of Social Gatherings with  Friends and Family: More than three times a week    Active Member of Clubs or Organizations: No    Attends Club or Organization Meetings: Never    Marital Status:    Housing Stability: Low Risk     Unable to Pay for Housing in the Last Year: No    Number of Places Lived in the Last Year: 1    Unstable Housing in the Last Year: No       Surgical History:  Past Surgical History:   Procedure Laterality Date    ANKLE SURGERY      COLON SURGERY  2007    [facility]    COLONOSCOPY N/A 2/17/2021    Procedure: COLONOSCOPY;  Surgeon: Renea Billingsley MD;  Location: [facility];  Service: Endoscopy;  Laterality: N/A;    COLONOSCOPY W/ BIOPSIES  02/17/2021    ESOPHAGOGASTRODUODENOSCOPY N/A 7/26/2019    Procedure: EGD (ESOPHAGOGASTRODUODENOSCOPY);  Surgeon: Brian Trivedi MD;  Location: [facility];  Service: Endoscopy;  Laterality: N/A;    ESOPHAGOGASTRODUODENOSCOPY N/A 4/8/2020    Procedure: EGD (ESOPHAGOGASTRODUODENOSCOPY);  Surgeon: Donn Acuna MD;  Location: [facility];  Service: Endoscopy;  Laterality: N/A;    ESOPHAGOGASTRODUODENOSCOPY N/A 1/3/2021    Procedure: EGD (ESOPHAGOGASTRODUODENOSCOPY)- coffee ground emesis, hx varices;  Surgeon: Steve Chairez MD;  Location: [facility];  Service: Endoscopy;  Laterality: N/A;    ESOPHAGOGASTRODUODENOSCOPY N/A 5/3/2021    Procedure: EGD (ESOPHAGOGASTRODUODENOSCOPY);  Surgeon: Jeanmarie Ngo MD;  Location: [facility];  Service: Endoscopy;  Laterality: N/A;    ESOPHAGOGASTRODUODENOSCOPY N/A 7/19/2021    Procedure: ESOPHAGOGASTRODUODENOSCOPY (EGD);  Surgeon: Claudio Medeiros MD;  Location: [facility];  Service: Endoscopy;  Laterality: N/A;    ESOPHAGOGASTRODUODENOSCOPY  12/20/2021    ESOPHAGOGASTRODUODENOSCOPY N/A 12/20/2021    Procedure: EGD (ESOPHAGOGASTRODUODENOSCOPY);  Surgeon: Ajay Jackson MD;  Location: [facility];  Service: Endoscopy;  Laterality: N/A;    HERNIA REPAIR Left     Inguinal         Family  History:  Family History   Problem Relation Age of Onset    Hypertension Mother     Breast cancer Mother     Hypertension Father     Prostate cancer Father     Bladder Cancer Father        Review of systems:     CONSTITUTIONAL: Negative for fever, chills, weakness, weight loss, weight gain.  HEENT: Negative for blurred vision, hearing loss, nasal congestion, dry mouth, sore throat.  CARDIOVASCULAR: Negative for chest pain or palpitations.  RESPIRATORY: Negative for SOB or cough.  GASTROINTESTINAL: See HPI  GENITOURINARY: Negative for dysuria or hematuria.  MUSCULOSKELETAL: Negative for osteoarthritis or muscle pain.  SKIN: Negative for rashes/lesions.  NEUROLOGIC: Negative for headaches, numbness/tingling.  ENDOCRINE: Negative for diabetes or thyroid abnormalities.  HEMATOLOGIC: Negative for anemia or blood dyscrasias.  Aside from above positives, complete 10 point review of systems negative.    Physical Exam:  Vital Signs (Most Recent):  Temp: 98.6 °F (37 °C) (05/02/22 1248)  Pulse: 88 (05/02/22 1248)  Resp: 18 (05/02/22 1248)  BP: 123/67 (05/02/22 1248)  SpO2: 98 % (05/02/22 1248) Vital Signs (24h Range):  Temp:  [98 °F (36.7 °C)-98.8 °F (37.1 °C)] 98.6 °F (37 °C)  Pulse:  [] 88  Resp:  [14-20] 18  SpO2:  [95 %-99 %] 98 %  BP: (123-154)/(67-84) 123/67       General: Well developed, well nourished, male in no acute distress.    Eyes:  Anicteric sclera, PERRLA  ENT:  Moist mucous membranes, no drainage from ears or nose, hearing grossly intact  Lymph:  No cervical, supraclavicular or axillary lymphadenopathy  Neck:  Supple, no nodes or masses felt, no thyromegaly  Cardiovascular:  Regular rate and rhythm without murmur  Lungs:  Clear to auscultation with normal effort; no wheezes or rales noted  GI:  Soft, NTND, no masses  Musculoskeletal:  5/5 strength bilaterally  Extremities: No clubbing, cyanosis, or edema, 2+ dorsalis pedis bilaterally  Neurologic:  No focal deficits, alert and oriented x 3  Psych:   Appropriate mood and affect  Skin:  No rash, no pallor, no lesions       Labs:   Latest Reference Range & Units 05/01/22 18:11 05/02/22 07:52   WBC 3.90 - 12.70 K/uL 6.10 4.04   RBC 4.60 - 6.20 M/uL 2.08 (L) 2.09 (L)   Hemoglobin 14.0 - 18.0 g/dL 8.1 (L) 8.0 (L)   Hematocrit 40.0 - 54.0 % 24.1 (L) 23.8 (L)   MCV 82 - 98 fL 116 (H) 114 (H)   MCH 27.0 - 31.0 pg 38.9 (H) 38.3 (H)   MCHC 32.0 - 36.0 g/dL 33.6 33.6   RDW 11.5 - 14.5 % 15.9 (H) 15.6 (H)   Platelets 150 - 450 K/uL 37 (LL) [1] 43 (L)   (L): Data is abnormally low  (H): Data is abnormally high  (LL): Data is critically low  [1] PLT  critical result(s) called and verbal readback obtained from    Edouard Thomas RN by ALEXIS 05/01/2022 20:04    Latest Reference Range & Units 05/01/22 11:58   Iron 45 - 160 ug/dL 225 (H)   TIBC 250 - 450 ug/dL 311   Saturated Iron 20 - 50 % 72 (H)   Transferrin 200 - 375 mg/dL 210   Ferritin 20.0 - 300.0 ng/mL 309 (H)   Folate 4.0 - 24.0 ng/mL 7.2   Vitamin B-12 210 - 950 pg/mL 718   (H): Data is abnormally high   Latest Reference Range & Units 05/02/22 07:52   Sodium 136 - 145 mmol/L 136   Potassium 3.5 - 5.1 mmol/L 3.8   Chloride 95 - 110 mmol/L 101   CO2 23 - 29 mmol/L 25   Anion Gap 8 - 16 mmol/L 10   BUN 6 - 20 mg/dL 24 (H)   Creatinine 0.5 - 1.4 mg/dL 2.3 (H)   EGFR if non African American >60 mL/min/1.73 m^2 33 ! [1]   EGFR if African American >60 mL/min/1.73 m^2 38 !   Glucose 70 - 110 mg/dL 173 (H)   Calcium 8.7 - 10.5 mg/dL 9.0   Alkaline Phosphatase 55 - 135 U/L 78   PROTEIN TOTAL 6.0 - 8.4 g/dL 7.6   Albumin 3.5 - 5.2 g/dL 3.1 (L)   BILIRUBIN TOTAL 0.1 - 1.0 mg/dL 5.3 (H) [2]   AST 10 - 40 U/L 108 (H)   ALT 10 - 44 U/L 33   (H): Data is abnormally high  !: Data is abnormal  (L): Data is abnormally low  [1] Calculation used to obtain the estimated glomerular filtration   rate (eGFR) is the CKD-EPI equation.      [2] For infants and newborns, interpretation of results should be based   on gestational age, weight and in  agreement with clinical   observations.      Premature Infant recommended reference ranges:   Up to 24 hours.............<8.0 mg/dL   Up to 48 hours............<12.0 mg/dL   3-5 days..................<15.0 mg/dL   6-29 days.................<15.0 mg/dL   Imaging and Other Studies:  No new    Assessment:  Patient is a 47 yo with PMHx of ETOH cirrhosis, small esophageal varices, portal HTN gastropagthy, CKD admitted with N/V and hematemesis after relapse of ETOH use.    Plan:  1.  CIWA protocol  2.  Thiamine, folate, MVI  3.  Octreotide, PPI infusions  4.  Rocephin for SBP prophylaxis  5.  EGD in the AM     Discussed with Dr. Becker.    Donn Acuna

## 2022-05-02 NOTE — ASSESSMENT & PLAN NOTE
Ethanol level <10.  Tox screen negative on admission.  Given a total of Ativan 4mg in ED.  CIWA at time of my interview was 8.  Last drink was 48 hours PTA.  Drinking a bottle of gin daily for past 2 weeks.  History of ETOH WD, but denies seizures.  Received IV Valium 10mg x 2 overnight.  CIWA <8 this morning.  -Continue with CIWA-Ar q4 with IV Valium 10mg for CIWA >8  -Thiamine/Folic Acid/MVI  -Continue with IV fluids  -Fall and Seizure precautions     1 person + 1 person to manage equipment

## 2022-05-02 NOTE — ASSESSMENT & PLAN NOTE
Labs on admission with AG 33, 1+ ketones on UA.  Glucose 51.  Started D5 1/2 NS.  Glucose now elevated.  -Change IV fluids to LR this morning

## 2022-05-02 NOTE — ASSESSMENT & PLAN NOTE
Ethanol level <10.  Tox screen negative on admission.  Given a total of Ativan 4mg in ED.  CIWA at time of my interview was 8.  Last drink was 48 hours PTA.  Drinking a bottle of gin daily for past 2 weeks.  History of ETOH WD, but denies seizures.  Received IV Valium 10mg x 2 overnight.  CIWA <8 this morning.  -Continue with CIWA-Ar q4 with IV Valium 10mg for CIWA >8  -Thiamine/Folic Acid/MVI  -Continue with IV fluids  -Fall and Seizure precautions

## 2022-05-02 NOTE — ASSESSMENT & PLAN NOTE
Chronic and stable at 51K and admission.  Dropped to 37k and transfused 1 unit of Plts.  -Monitor and keep >50k with evidence of bleeding

## 2022-05-02 NOTE — PROGRESS NOTES
"[facility] Medicine  Progress Note    Patient Name: Irvin Diaz Jr.  MRN: [id number]  Patient Class: OP- Observation   Admission Date: 2022  Length of Stay: 0 days  Attending Physician: Roberto Becker MD  Primary Care Provider: Shalonda Snyder MD        Subjective:     Principal Problem:Hematemesis    HPI:  Patient is a 46 year old male with a PMH significant for Alcohol Abuse complicated by Cirrhosis, Esophageal Varices with history of GIB, PE, HTN, HFpEF, Thrombocytopenia, JAY, CKD3 who presents with N/V and hematemesis that began early this morning.  Patient was sober from alcohol for about 2 months, but relapsed 2 weeks ago after his brother  from a stroke.  Patient is currently drinking a "bottle of gin" daily with his las drink about 2 days PTA.  Patient has had admissions for withdrawal in past.  Patient is awake and alert, and oriented.  He is still with some nausea, but emesis has resolved.  Mild epigastric pain.  Denies f/c, diarrhea.  No dysuria.  No chest pain or SOB.  Has a mild headache.  He was treated with Ativan 2mg IV x 2, NS x 2L.  CIWA at the time of my interview was 8.      Overview/Hospital Course:  No notes on file    Interval History: Patient is awake and alert this morning.  No further emesis/hematemesis since admission.  No f/c.  CIWA was <8 this morning.    Review of Systems   Constitutional:  Negative for chills and fever.   HENT:  Negative for congestion and ear pain.    Eyes:  Negative for pain.   Respiratory:  Negative for cough, shortness of breath and wheezing.    Cardiovascular:  Negative for chest pain and leg swelling.   Gastrointestinal:  Positive for abdominal pain. Negative for abdominal distention, blood in stool, diarrhea, nausea and vomiting.   Genitourinary:  Negative for difficulty urinating and dysuria.   Musculoskeletal:  Negative for back pain and neck pain.   Skin:  Negative for rash.   Neurological:  Positive for " tremors (mild and stable). Negative for dizziness, seizures and headaches.   Psychiatric/Behavioral:  Negative for agitation, behavioral problems, confusion and decreased concentration.    Objective:     Vital Signs (Most Recent):  Temp: 98.2 °F (36.8 °C) (05/02/22 0715)  Pulse: 91 (05/02/22 1000)  Resp: 14 (05/02/22 0715)  BP: (!) 154/72 (05/02/22 0715)  SpO2: 95 % (05/02/22 0715)   Vital Signs (24h Range):  Temp:  [98 °F (36.7 °C)-98.9 °F (37.2 °C)] 98.2 °F (36.8 °C)  Pulse:  [] 91  Resp:  [14-20] 14  SpO2:  [95 %-99 %] 95 %  BP: (131-154)/(68-84) 154/72     Weight: 93 kg (205 lb)  Body mass index is 27.8 kg/m².    Intake/Output Summary (Last 24 hours) at 5/2/2022 1144  Last data filed at 5/2/2022 0800  Gross per 24 hour   Intake 2359.65 ml   Output 1475 ml   Net 884.65 ml      Physical Exam  Vitals reviewed.   Constitutional:       General: He is not in acute distress.     Appearance: He is ill-appearing (chronic). He is not toxic-appearing or diaphoretic.   HENT:      Head: Normocephalic and atraumatic.      Right Ear: External ear normal.      Left Ear: External ear normal.      Nose: Nose normal.      Mouth/Throat:      Mouth: Mucous membranes are dry.   Eyes:      General: Scleral icterus present.      Conjunctiva/sclera: Conjunctivae normal.      Pupils: Pupils are equal, round, and reactive to light.   Cardiovascular:      Rate and Rhythm: Normal rate and regular rhythm.      Pulses: Normal pulses.      Heart sounds: Normal heart sounds. No murmur heard.  Pulmonary:      Effort: Pulmonary effort is normal. No respiratory distress.      Breath sounds: Normal breath sounds. No wheezing, rhonchi or rales.   Abdominal:      General: Bowel sounds are normal.      Palpations: Abdomen is soft.      Tenderness: There is abdominal tenderness (mild epigastric).      Comments: Protuberant, but soft   Musculoskeletal:         General: Normal range of motion.      Cervical back: Normal range of motion and neck  supple. No rigidity.      Right lower leg: No edema.      Left lower leg: No edema.   Skin:     General: Skin is warm and dry.      Findings: No rash.   Neurological:      General: No focal deficit present.      Mental Status: He is alert and oriented to person, place, and time. Mental status is at baseline.      Motor: No weakness.   Psychiatric:         Mood and Affect: Mood normal.         Behavior: Behavior normal.       Significant Labs: All pertinent labs within the past 24 hours have been reviewed.    Significant Imaging: I have reviewed all pertinent imaging results/findings within the past 24 hours.      Assessment/Plan:      * Hematemesis  Patient presented with N/V with Coffee Ground Emesis which began early morning on the day of admission.  Hgb on admission was 8.8 (was 8.7 in 3/2022).  Patient with history of Alcohol-associated Cirrhosis.  EGD in 12/2021 with small esophageal varices and portal hypertensive gastropathy.  Vitals significant for tachycardia, but no hypotension.  He was started on IV Ceftriaxone, IV Octreotide, IV Protonix.  GI was consulted.   Hgb stable at 8.0.  Plts dropped to 37k and was transfused 1 unit of Plts.  Emesis resolved and withdrawal symptoms improved.    Plan:  GI consulted - await recommendations  Continue with Protonix 40mg IV q12  Continue with Ceftriaxone 1g q24  Continue with Octreotide infuision  Follow CBC and transfuse to keep Hgb >7 and Plts >50k        Alcohol withdrawal  Ethanol level <10.  Tox screen negative on admission.  Given a total of Ativan 4mg in ED.  CIWA at time of my interview was 8.  Last drink was 48 hours PTA.  Drinking a bottle of gin daily for past 2 weeks.  History of ETOH WD, but denies seizures.  Received IV Valium 10mg x 2 overnight.  CIWA <8 this morning.  -Continue with CIWA-Ar q4 with IV Valium 10mg for CIWA >8  -Thiamine/Folic Acid/MVI  -Continue with IV fluids  -Fall and Seizure precautions      Alcoholic ketoacidosis  Labs on admission  with AG 33, 1+ ketones on UA.  Glucose 51.  Started D5 1/2 NS.  Glucose now elevated.  -Change IV fluids to LR this morning        CKD (chronic kidney disease) stage 3, GFR 30-59 ml/min  Creatinine 2.0 on admission with baseline around 1.6-2.0 over past year.  -Renal Dose Medications  -Avoid Nephrotoxins      Macrocytic anemia  Hgb 8.8 on admission with last Hgb 8.7 in 3/2022.  MCV increased at 122.  Fe studies with Ferritin 309 with Fe sat of 72%.  B12/Folate normal.  -Follow      Alcoholic cirrhosis of liver  Followed by [facility] and last seen in 12/2021.  CT A/P done in 3/2022 with findings of cirrhosis and mild abdominal ascites as described essentially stable.  Last EGD done in 12/2021 with small esophageal varices and portal hypertensive gastropathy as above.  Labs on admission with INR 1.3, T Bili of 5.6, AST/ALT of 95/31.  No evidence of significant encephalopathy at this time.  MELD-Na on admission was 23 (7-10% 90-day mortality).  Home Meds:  Rifaximin 500mg bid  -Continue Rifaximin      Thrombocytopenia  Chronic and stable at 51K and admission.  Dropped to 37k and transfused 1 unit of Plts.  -Monitor and keep >50k with evidence of bleeding        VTE Risk Mitigation (From admission, onward)         Ordered     IP VTE HIGH RISK PATIENT  Once         05/01/22 1158     Place sequential compression device  Until discontinued         05/01/22 1158                Discharge Planning   RADHA:      Code Status: Full Code   Is the patient medically ready for discharge?:     Reason for patient still in hospital (select all that apply): Patient trending condition, Treatment and Consult recommendations  Discharge Plan A: Home                  Roberto Becker MD  Department of Hospital Medicine   [facility]

## 2022-05-02 NOTE — PLAN OF CARE
Plan of care reviewed with patient and family.  Patient verbalized understanding and had no further questions.  Patient continues to receive IVF and sandostatin drip throughout the shift.  Patient will be NPO past midnight for EGD tomorrow AM.  Patient now resting comfortably in bed locked in lowest position, side rails up x3, and call bell in reach.  Will continue to monitor.

## 2022-05-02 NOTE — TELEPHONE ENCOUNTER
----- Message from Hilda Langley sent at 5/2/2022  7:35 AM CDT -----  Type: Hospital Consult    Name of Who is Calling: Marianela (nurse) with [address]     Room/Bed# :  Rm 368    Diagnosis:   Hematemesis and   Alcoholic cirrhosis of liver      Referring Physician: Dr. Becker    Call Back Number:   [phone]

## 2022-05-02 NOTE — ASSESSMENT & PLAN NOTE
Patient presented with N/V with Coffee Ground Emesis which began early morning on the day of admission.  Hgb on admission was 8.8 (was 8.7 in 3/2022).  Patient with history of Alcohol-associated Cirrhosis.  EGD in 12/2021 with small esophageal varices and portal hypertensive gastropathy.  Vitals significant for tachycardia, but no hypotension.  He was started on IV Ceftriaxone, IV Octreotide, IV Protonix.  GI was consulted.   Hgb stable at 8.0.  Plts dropped to 37k and was transfused 1 unit of Plts.  Emesis resolved and withdrawal symptoms improved.    Plan:  GI consulted - await recommendations  Continue with Protonix 40mg IV q12  Continue with Ceftriaxone 1g q24  Continue with Octreotide infuision  Follow CBC and transfuse to keep Hgb >7 and Plts >50k

## 2022-05-02 NOTE — ASSESSMENT & PLAN NOTE
Hgb 8.8 on admission with last Hgb 8.7 in 3/2022.  MCV increased at 122.  Fe studies with Ferritin 309 with Fe sat of 72%.  B12/Folate normal.  -Follow

## 2022-05-02 NOTE — HOSPITAL COURSE
Patient is a 46-year-old man with history of alcohol abuse complicated by alcoholic cirrhosis, esophageal varices, with prior history of gastrointestinal bleeding, hypertension, chronic thrombocytopenia, pulmonary embolism, chronic diastolic heart failure, chronic kidney disease stage 3 who was admitted to the hospital with evidence of acute upper gastrointestinal bleeding along with evidence of alcohol withdrawal.  Patient treated with intravenous ceftriaxone, octreotide, and pantoprazole.  Patient also transfuse 1 dose of platelets.    Patient given doses of benzodiazepines as needed to treat alcohol withdrawal.  Gastroenterology service consulted recommended upper endoscopy which was performed and revealed esophageal varices, esophagitis, gastritis, and a nonbleeding gastric ulcer, and duodenitis.  No stigmata of bleeding noted with gastric ulcer.  Suspect bleeding secondary to esophagitis, gastritis, complicating portal hypertensive gastropathy.  Patient had resolution of bleeding with stable serial hemoglobin levels.  Patient transitioned to oral pantoprazole and further intravenous octreotide and ceftriaxone was discontinued as it was not felt that his bleeding was secondary to esophageal varices.  Patient weaned off benzodiazepines with resolution of withdrawal symptoms.  Patient counseled on the critical importance of abstaining from further alcohol abuse.  Close outpatient follow-up with primary care physician physician for ongoing management of chronic medical problems along with follow-up with gastroenterology clinic for repeat endoscopy to ensure resolution of inflamed upper gastrointestinal tract and healing of his gastric ulcer advised.

## 2022-05-02 NOTE — SUBJECTIVE & OBJECTIVE
Interval History: Patient is awake and alert this morning.  No further emesis/hematemesis since admission.  No f/c.  CIWA was <8 this morning.    Review of Systems   Constitutional:  Negative for chills and fever.   HENT:  Negative for congestion and ear pain.    Eyes:  Negative for pain.   Respiratory:  Negative for cough, shortness of breath and wheezing.    Cardiovascular:  Negative for chest pain and leg swelling.   Gastrointestinal:  Positive for abdominal pain. Negative for abdominal distention, blood in stool, diarrhea, nausea and vomiting.   Genitourinary:  Negative for difficulty urinating and dysuria.   Musculoskeletal:  Negative for back pain and neck pain.   Skin:  Negative for rash.   Neurological:  Positive for tremors (mild and stable). Negative for dizziness, seizures and headaches.   Psychiatric/Behavioral:  Negative for agitation, behavioral problems, confusion and decreased concentration.    Objective:     Vital Signs (Most Recent):  Temp: 98.2 °F (36.8 °C) (05/02/22 0715)  Pulse: 91 (05/02/22 1000)  Resp: 14 (05/02/22 0715)  BP: (!) 154/72 (05/02/22 0715)  SpO2: 95 % (05/02/22 0715)   Vital Signs (24h Range):  Temp:  [98 °F (36.7 °C)-98.9 °F (37.2 °C)] 98.2 °F (36.8 °C)  Pulse:  [] 91  Resp:  [14-20] 14  SpO2:  [95 %-99 %] 95 %  BP: (131-154)/(68-84) 154/72     Weight: 93 kg (205 lb)  Body mass index is 27.8 kg/m².    Intake/Output Summary (Last 24 hours) at 5/2/2022 1144  Last data filed at 5/2/2022 0800  Gross per 24 hour   Intake 2359.65 ml   Output 1475 ml   Net 884.65 ml      Physical Exam  Vitals reviewed.   Constitutional:       General: He is not in acute distress.     Appearance: He is ill-appearing (chronic). He is not toxic-appearing or diaphoretic.   HENT:      Head: Normocephalic and atraumatic.      Right Ear: External ear normal.      Left Ear: External ear normal.      Nose: Nose normal.      Mouth/Throat:      Mouth: Mucous membranes are dry.   Eyes:      General: Scleral  icterus present.      Conjunctiva/sclera: Conjunctivae normal.      Pupils: Pupils are equal, round, and reactive to light.   Cardiovascular:      Rate and Rhythm: Normal rate and regular rhythm.      Pulses: Normal pulses.      Heart sounds: Normal heart sounds. No murmur heard.  Pulmonary:      Effort: Pulmonary effort is normal. No respiratory distress.      Breath sounds: Normal breath sounds. No wheezing, rhonchi or rales.   Abdominal:      General: Bowel sounds are normal.      Palpations: Abdomen is soft.      Tenderness: There is abdominal tenderness (mild epigastric).      Comments: Protuberant, but soft   Musculoskeletal:         General: Normal range of motion.      Cervical back: Normal range of motion and neck supple. No rigidity.      Right lower leg: No edema.      Left lower leg: No edema.   Skin:     General: Skin is warm and dry.      Findings: No rash.   Neurological:      General: No focal deficit present.      Mental Status: He is alert and oriented to person, place, and time. Mental status is at baseline.      Motor: No weakness.   Psychiatric:         Mood and Affect: Mood normal.         Behavior: Behavior normal.       Significant Labs: All pertinent labs within the past 24 hours have been reviewed.    Significant Imaging: I have reviewed all pertinent imaging results/findings within the past 24 hours.

## 2022-05-02 NOTE — PLAN OF CARE
AAOX4. VSS. Moderate tremors with unsteady gait. No sweating with clear speech. Mobility per walking cane. Voiding yellow urine per urinal. No stools during shift. One person assist per adls and transfers, fall and seizure precautions. Tele. Received critical lab of Platelets 37 @ 2001, on call Hospitalist notified @ 2004. New orders noted. Tolerating clear liquid diet, medications, and IVF well. HOB 30 degrees with call bell and bedside table within reach, bed in lowest position with hourly purposeful rounding. Alarms on and audible, will continue to monitor.   Problem: Adult Inpatient Plan of Care  Goal: Plan of Care Review  Outcome: Ongoing, Progressing  Goal: Patient-Specific Goal (Individualized)  Outcome: Ongoing, Progressing  Goal: Absence of Hospital-Acquired Illness or Injury  Outcome: Ongoing, Progressing  Goal: Optimal Comfort and Wellbeing  Outcome: Ongoing, Progressing  Goal: Readiness for Transition of Care  Outcome: Ongoing, Progressing     Problem: Adjustment to Illness (Gastrointestinal Bleeding)  Goal: Optimal Coping with Acute Illness  Outcome: Ongoing, Progressing     Problem: Bleeding (Gastrointestinal Bleeding)  Goal: Hemostasis  Outcome: Ongoing, Progressing     Problem: Adjustment to Illness (Sepsis/Septic Shock)  Goal: Optimal Coping  Outcome: Ongoing, Progressing     Problem: Bleeding (Sepsis/Septic Shock)  Goal: Absence of Bleeding  Outcome: Ongoing, Progressing     Problem: Glycemic Control Impaired (Sepsis/Septic Shock)  Goal: Blood Glucose Level Within Desired Range  Outcome: Ongoing, Progressing     Problem: Infection Progression (Sepsis/Septic Shock)  Goal: Absence of Infection Signs and Symptoms  Outcome: Ongoing, Progressing     Problem: Nutrition Impaired (Sepsis/Septic Shock)  Goal: Optimal Nutrition Intake  Outcome: Ongoing, Progressing     Problem: Fluid and Electrolyte Imbalance (Acute Kidney Injury/Impairment)  Goal: Fluid and Electrolyte Balance  Outcome: Ongoing,  Progressing     Problem: Oral Intake Inadequate (Acute Kidney Injury/Impairment)  Goal: Optimal Nutrition Intake  Outcome: Ongoing, Progressing     Problem: Renal Function Impairment (Acute Kidney Injury/Impairment)  Goal: Effective Renal Function  Outcome: Ongoing, Progressing     Problem: Fall Injury Risk  Goal: Absence of Fall and Fall-Related Injury  Outcome: Ongoing, Progressing

## 2022-05-02 NOTE — ASSESSMENT & PLAN NOTE
Followed by [facility] and last seen in 12/2021.  CT A/P done in 3/2022 with findings of cirrhosis and mild abdominal ascites as described essentially stable.  Last EGD done in 12/2021 with small esophageal varices and portal hypertensive gastropathy as above.  Labs on admission with INR 1.3, T Bili of 5.6, AST/ALT of 95/31.  No evidence of significant encephalopathy at this time.  MELD-Na on admission was 23 (7-10% 90-day mortality).  Home Meds:  Rifaximin 500mg bid  -Continue Rifaximin

## 2022-05-03 ENCOUNTER — ANESTHESIA EVENT (OUTPATIENT)
Dept: ENDOSCOPY | Facility: OTHER | Age: 47
DRG: 368 | End: 2022-05-03
Payer: MEDICARE

## 2022-05-03 ENCOUNTER — ANESTHESIA (OUTPATIENT)
Dept: ENDOSCOPY | Facility: OTHER | Age: 47
DRG: 368 | End: 2022-05-03
Payer: MEDICARE

## 2022-05-03 ENCOUNTER — PATIENT OUTREACH (OUTPATIENT)
Dept: ADMINISTRATIVE | Facility: OTHER | Age: 47
End: 2022-05-03
Payer: MEDICARE

## 2022-05-03 LAB
BASOPHILS # BLD AUTO: 0.01 K/UL (ref 0–0.2)
BASOPHILS NFR BLD: 0.2 % (ref 0–1.9)
DIFFERENTIAL METHOD: ABNORMAL
EOSINOPHIL # BLD AUTO: 0 K/UL (ref 0–0.5)
EOSINOPHIL NFR BLD: 0.7 % (ref 0–8)
ERYTHROCYTE [DISTWIDTH] IN BLOOD BY AUTOMATED COUNT: 15.6 % (ref 11.5–14.5)
HCT VFR BLD AUTO: 24.6 % (ref 40–54)
HGB BLD-MCNC: 8.2 G/DL (ref 14–18)
IMM GRANULOCYTES # BLD AUTO: 0.01 K/UL (ref 0–0.04)
IMM GRANULOCYTES NFR BLD AUTO: 0.2 % (ref 0–0.5)
LYMPHOCYTES # BLD AUTO: 0.9 K/UL (ref 1–4.8)
LYMPHOCYTES NFR BLD: 19.7 % (ref 18–48)
MCH RBC QN AUTO: 39.4 PG (ref 27–31)
MCHC RBC AUTO-ENTMCNC: 33.3 G/DL (ref 32–36)
MCV RBC AUTO: 118 FL (ref 82–98)
MONOCYTES # BLD AUTO: 0.4 K/UL (ref 0.3–1)
MONOCYTES NFR BLD: 8 % (ref 4–15)
NEUTROPHILS # BLD AUTO: 3.2 K/UL (ref 1.8–7.7)
NEUTROPHILS NFR BLD: 71.2 % (ref 38–73)
NRBC BLD-RTO: 0 /100 WBC
PLATELET # BLD AUTO: 43 K/UL (ref 150–450)
PMV BLD AUTO: 12 FL (ref 9.2–12.9)
RBC # BLD AUTO: 2.08 M/UL (ref 4.6–6.2)
WBC # BLD AUTO: 4.51 K/UL (ref 3.9–12.7)

## 2022-05-03 PROCEDURE — 43235 EGD DIAGNOSTIC BRUSH WASH: CPT | Performed by: INTERNAL MEDICINE

## 2022-05-03 PROCEDURE — 99233 SBSQ HOSP IP/OBS HIGH 50: CPT | Mod: ,,, | Performed by: HOSPITALIST

## 2022-05-03 PROCEDURE — 63600175 PHARM REV CODE 636 W HCPCS: Performed by: INTERNAL MEDICINE

## 2022-05-03 PROCEDURE — 94761 N-INVAS EAR/PLS OXIMETRY MLT: CPT

## 2022-05-03 PROCEDURE — 25000003 PHARM REV CODE 250: Performed by: INTERNAL MEDICINE

## 2022-05-03 PROCEDURE — C9113 INJ PANTOPRAZOLE SODIUM, VIA: HCPCS | Performed by: INTERNAL MEDICINE

## 2022-05-03 PROCEDURE — 99233 PR SUBSEQUENT HOSPITAL CARE,LEVL III: ICD-10-PCS | Mod: ,,, | Performed by: HOSPITALIST

## 2022-05-03 PROCEDURE — 85025 COMPLETE CBC W/AUTO DIFF WBC: CPT | Performed by: INTERNAL MEDICINE

## 2022-05-03 PROCEDURE — 25000003 PHARM REV CODE 250: Performed by: HOSPITALIST

## 2022-05-03 PROCEDURE — 63600175 PHARM REV CODE 636 W HCPCS: Performed by: PHYSICIAN ASSISTANT

## 2022-05-03 PROCEDURE — 21400001 HC TELEMETRY ROOM

## 2022-05-03 PROCEDURE — 37000008 HC ANESTHESIA 1ST 15 MINUTES: Performed by: INTERNAL MEDICINE

## 2022-05-03 PROCEDURE — 37000009 HC ANESTHESIA EA ADD 15 MINS: Performed by: INTERNAL MEDICINE

## 2022-05-03 PROCEDURE — 25000003 PHARM REV CODE 250: Performed by: NURSE ANESTHETIST, CERTIFIED REGISTERED

## 2022-05-03 PROCEDURE — 36415 COLL VENOUS BLD VENIPUNCTURE: CPT | Performed by: INTERNAL MEDICINE

## 2022-05-03 PROCEDURE — 63600175 PHARM REV CODE 636 W HCPCS: Performed by: NURSE ANESTHETIST, CERTIFIED REGISTERED

## 2022-05-03 RX ORDER — DIPHENHYDRAMINE HYDROCHLORIDE 50 MG/ML
25 INJECTION INTRAMUSCULAR; INTRAVENOUS EVERY 6 HOURS PRN
Status: DISCONTINUED | OUTPATIENT
Start: 2022-05-03 | End: 2022-05-03

## 2022-05-03 RX ORDER — SODIUM CHLORIDE 0.9 % (FLUSH) 0.9 %
3 SYRINGE (ML) INJECTION
Status: DISCONTINUED | OUTPATIENT
Start: 2022-05-03 | End: 2022-05-04

## 2022-05-03 RX ORDER — PROPOFOL 10 MG/ML
VIAL (ML) INTRAVENOUS
Status: DISCONTINUED | OUTPATIENT
Start: 2022-05-03 | End: 2022-05-03

## 2022-05-03 RX ORDER — DIPHENHYDRAMINE HCL 25 MG
25 CAPSULE ORAL EVERY 6 HOURS PRN
Status: DISCONTINUED | OUTPATIENT
Start: 2022-05-03 | End: 2022-05-04 | Stop reason: HOSPADM

## 2022-05-03 RX ORDER — LIDOCAINE HYDROCHLORIDE 20 MG/ML
INJECTION INTRAVENOUS
Status: DISCONTINUED | OUTPATIENT
Start: 2022-05-03 | End: 2022-05-03

## 2022-05-03 RX ORDER — MEPERIDINE HYDROCHLORIDE 25 MG/ML
12.5 INJECTION INTRAMUSCULAR; INTRAVENOUS; SUBCUTANEOUS ONCE AS NEEDED
Status: CANCELLED | OUTPATIENT
Start: 2022-05-03 | End: 2022-05-04

## 2022-05-03 RX ORDER — HYDROMORPHONE HYDROCHLORIDE 2 MG/ML
0.4 INJECTION, SOLUTION INTRAMUSCULAR; INTRAVENOUS; SUBCUTANEOUS EVERY 5 MIN PRN
Status: DISCONTINUED | OUTPATIENT
Start: 2022-05-03 | End: 2022-05-04

## 2022-05-03 RX ORDER — PROCHLORPERAZINE EDISYLATE 5 MG/ML
5 INJECTION INTRAMUSCULAR; INTRAVENOUS EVERY 30 MIN PRN
Status: DISCONTINUED | OUTPATIENT
Start: 2022-05-03 | End: 2022-05-04

## 2022-05-03 RX ORDER — OXYCODONE HYDROCHLORIDE 5 MG/1
5 TABLET ORAL
Status: DISCONTINUED | OUTPATIENT
Start: 2022-05-03 | End: 2022-05-04

## 2022-05-03 RX ADMIN — DIPHENHYDRAMINE HYDROCHLORIDE 25 MG: 25 CAPSULE ORAL at 08:05

## 2022-05-03 RX ADMIN — RIFAXIMIN 550 MG: 550 TABLET ORAL at 09:05

## 2022-05-03 RX ADMIN — FOLIC ACID 1 MG: 1 TABLET ORAL at 09:05

## 2022-05-03 RX ADMIN — CARVEDILOL 6.25 MG: 6.25 TABLET, FILM COATED ORAL at 04:05

## 2022-05-03 RX ADMIN — CEFTRIAXONE 1 G: 1 INJECTION, SOLUTION INTRAVENOUS at 12:05

## 2022-05-03 RX ADMIN — PANTOPRAZOLE SODIUM 40 MG: 40 INJECTION, POWDER, FOR SOLUTION INTRAVENOUS at 08:05

## 2022-05-03 RX ADMIN — SODIUM CHLORIDE, SODIUM LACTATE, POTASSIUM CHLORIDE, AND CALCIUM CHLORIDE 500 ML: .6; .31; .03; .02 INJECTION, SOLUTION INTRAVENOUS at 03:05

## 2022-05-03 RX ADMIN — THIAMINE HCL TAB 100 MG 100 MG: 100 TAB at 09:05

## 2022-05-03 RX ADMIN — OCTREOTIDE ACETATE 50 MCG/HR: 500 INJECTION, SOLUTION INTRAVENOUS; SUBCUTANEOUS at 02:05

## 2022-05-03 RX ADMIN — PROPOFOL 300 MG: 10 INJECTION, EMULSION INTRAVENOUS at 08:05

## 2022-05-03 RX ADMIN — LIDOCAINE HYDROCHLORIDE 100 MG: 20 INJECTION, SOLUTION INTRAVENOUS at 08:05

## 2022-05-03 RX ADMIN — DIPHENHYDRAMINE HYDROCHLORIDE 25 MG: 25 CAPSULE ORAL at 02:05

## 2022-05-03 RX ADMIN — PANTOPRAZOLE SODIUM 40 MG: 40 INJECTION, POWDER, FOR SOLUTION INTRAVENOUS at 09:05

## 2022-05-03 RX ADMIN — RIFAXIMIN 550 MG: 550 TABLET ORAL at 08:05

## 2022-05-03 RX ADMIN — THERA TABS 1 TABLET: TAB at 09:05

## 2022-05-03 NOTE — PROGRESS NOTES
IP Liaison - Initial Visit Note    Patient: Irvin Diaz Jr.  MRN:  [id number]  Date of Service:  5/3/2022  Completed by:  WILBER Cota    Reason for Visit   Patient presents with    IP Liaison Initial Visit     RSW met with patient at bedside in order to complete SDOH questionnaire and liaison assessment.  Pt has identified no barriers to care.  RSW offered alcoholism resources to patient and patient declined stating he does not drink often although he was admitted for Alcoholic ketoacidosis. RSW to follow up and patient already has follow up appt scheduled.     The following were addressed during this visit:  - Review SDOH Questions   - Complete patient assessment   - Review and discuss options to become more physically active        Patient Summary     IP Liaison Patient Assessment    General  Level of Caregiver support: Member independent and does not need caregiver assistance  Have you had to make a decision between paying for any of the following in the last 2 months?: None  Transportation means: Insurance-provided transportation  Employment status: Disabled  Do you have any of the following?: Medical power of   Current symptoms: Sleep disturbances  Assessments  Was the PHQ Depression Screening completed this visit?: Yes  Was the DUC-7 Screening completed this visit?: No       WILBER Cota

## 2022-05-03 NOTE — ANESTHESIA PREPROCEDURE EVALUATION
05/03/2022  Irvin Diaz Jr. is a 46 y.o., male.      Pre-op Assessment    I have reviewed the Patient Summary Reports.     I have reviewed the Nursing Notes.    I have reviewed the Medications.     Review of Systems  Anesthesia Hx:  No problems with previous Anesthesia  Denies Family Hx of Anesthesia complications.   Denies Personal Hx of Anesthesia complications.   Social:  Non-Smoker    Hematology/Oncology:  Hematology Normal   Oncology Normal     EENT/Dental:EENT/Dental Normal   Cardiovascular:   Hypertension    Pulmonary:  Pulmonary Normal    Renal/:   Chronic Renal Disease    Hepatic/GI:   GERD Liver Disease,    Musculoskeletal:  Musculoskeletal Normal    Neurological:  Neurology Normal    Endocrine:  Endocrine Normal    Dermatological:  Skin Normal    Psych:   Psychiatric History Depression: marijuana.          Physical Exam  General: Well nourished and Alert    Airway:  Mallampati: II   Mouth Opening: Normal  Tongue: Normal    Dental:  Intact        Anesthesia Plan  Type of Anesthesia, risks & benefits discussed:    Anesthesia Type: Gen Natural Airway  Intra-op Monitoring Plan: Standard ASA Monitors  Post Op Pain Control Plan: multimodal analgesia  Induction:  IV  Airway Plan: Video  Informed Consent: Informed consent signed with the Patient and all parties understand the risks and agree with anesthesia plan.  All questions answered.   ASA Score: 3    Ready For Surgery From Anesthesia Perspective.     .

## 2022-05-03 NOTE — PLAN OF CARE
AAOX4. VSS.Pt free of trauma, falls, injury and skin breakdown. Pt denies pain at this time. IV fluids infusing per MD order. Pt voiding(via urinal)adequately throughout shift. Pt ambulates and repositions self independently. Purposeful hourly rounding. Pt has call light in reach, side rails up X2, bed in low position and nonskid socks on. Pt lying in bed in no distress. Will continue to monitor.EGD completed this shift.

## 2022-05-03 NOTE — [employer] PATIENT INSTRUCTIONS
Discharge Summary/Instructions after an Endoscopic Procedure  Patient Name: Irvin Macias  Patient MRN: [id number]  Patient YOB: 1975  Tuesday, May 3, 2022  Brian Trivedi MD  RESTRICTIONS:  During your procedure today, you received medications for sedation.  These   medications may affect your judgment, balance and coordination.  Therefore,   for 24 hours, you have the following restrictions:   - DO NOT drive a car, operate machinery, make legal/financial decisions,   sign important papers or drink alcohol.    ACTIVITY:  Today: no heavy lifting, straining or running due to procedural   sedation/anesthesia.  The following day: return to full activity including work.  DIET:  Eat and drink normally unless instructed otherwise.     TREATMENT FOR COMMON SIDE EFFECTS:  - Mild abdominal pain, nausea, belching, bloating or excessive gas:  rest,   eat lightly and use a heating pad.  - Sore Throat: treat with throat lozenges and/or gargle with warm salt   water.  - Because air was used during the procedure, expelling large amounts of air   from your rectum or belching is normal.  - If a bowel prep was taken, you may not have a bowel movement for 1-3 days.    This is normal.  SYMPTOMS TO WATCH FOR AND REPORT TO YOUR PHYSICIAN:  1. Abdominal pain or bloating, other than gas cramps.  2. Chest pain.  3. Back pain.  4. Signs of infection such as: chills or fever occurring within 24 hours   after the procedure.  5. Rectal bleeding, which would show as bright red, maroon, or black stools.   (A tablespoon of blood from the rectum is not serious, especially if   hemorrhoids are present.)  6. Vomiting.  7. Weakness or dizziness.  GO DIRECTLY TO THE NEAREST EMERGENCY ROOM IF YOU HAVE ANY OF THE FOLLOWING:      Difficulty breathing              Chills and/or fever over 101 F   Persistent vomiting and/or vomiting blood   Severe abdominal pain   Severe chest pain   Black, tarry stools   Bleeding- more than one  tablespoon   Any other symptom or condition that you feel may need urgent attention  Your doctor recommends these additional instructions:  If any biopsies were taken, your doctors clinic will contact you in 1 to 2   weeks with any results.  - Return patient to [facility] for [facility] care.   - Advance diet as tolerated.   - Continue present medications.   - Observe patient's clinical course.  For questions, problems or results please call your physician - Brian Trivedi MD at Work:  [phone].  [facility], EMERGENCY ROOM PHONE NUMBER: [phone], [facility]   [phone].  IF A COMPLICATION OR EMERGENCY SITUATION ARISES AND YOU ARE UNABLE TO REACH   YOUR PHYSICIAN - GO DIRECTLY TO THE EMERGENCY ROOM.  Brian Trivedi MD  5/3/2022 8:21:43 AM  This report has been verified and signed electronically.  Dear patient,  As a result of recent federal legislation (The Federal Cures Act), you may   receive lab or pathology results from your procedure in your [facility]   account before your physician is able to contact you. Your physician or   their representative will relay the results to you with their   recommendations at their soonest availability.  Thank you,  [employer]

## 2022-05-03 NOTE — PLAN OF CARE
Patient is AAOx4. Patient reported pain and order followed. NPO at midnight. No acute events overnight. Patient resting comfortably at present. Bed locked in lowest position with side rails up x 2. Call light within reach of patient. Will continue purposeful rounding.

## 2022-05-03 NOTE — TRANSFER OF CARE
Anesthesia Transfer of Care Note    Patient: Irvin Diaz JrDavid    Procedure(s) Performed: Procedure(s) (LRB):  EGD (ESOPHAGOGASTRODUODENOSCOPY) (N/A)    Patient location: PACU    Anesthesia Type: general    Transport from OR: Transported from OR on 2-3 L/min O2 by NC with adequate spontaneous ventilation    Post pain: adequate analgesia    Post assessment: no apparent anesthetic complications    Post vital signs: stable    Level of consciousness: awake, alert and oriented    Nausea/Vomiting: no nausea/vomiting    Complications: none    Transfer of care protocol was followed      Last vitals:   Visit Vitals  BP (!) 157/94 (BP Location: Right arm, Patient Position: Lying)   Pulse 80   Temp 37.6 °C (99.6 °F) (Oral)   Resp 18   Ht 6' (1.829 m)   Wt 93 kg (205 lb)   SpO2 100%   BMI 27.80 kg/m²

## 2022-05-03 NOTE — PROGRESS NOTES
This gent had an upper endoscopy that was uncomplicated.  Findings:  1) Grade 1 lower esophageal varices, 3 cords, flat, no stigmata of blood losss  2) Distal Grade A esophagitis  3) Diffuse portal hypertension related gastropathy, no active bleeding. Nodular antral gastritis with small non-bleeding ulcer noted with a  Clean base  4) Duodenitis    Plan: serial labs, same medications, close observation for withdrawal symptoms.

## 2022-05-03 NOTE — ANESTHESIA POSTPROCEDURE EVALUATION
Anesthesia Post Evaluation    Patient: Irvin Diaz     Procedure(s) Performed: Procedure(s) (LRB):  EGD (ESOPHAGOGASTRODUODENOSCOPY) (N/A)    Final Anesthesia Type: general      Patient location during evaluation: PACU  Patient participation: Yes- Able to Participate  Level of consciousness: awake and alert  Post-procedure vital signs: reviewed and stable  Pain management: adequate  Airway patency: patent    PONV status at discharge: No PONV  Anesthetic complications: no      Cardiovascular status: blood pressure returned to baseline  Respiratory status: unassisted and room air  Hydration status: euvolemic  Follow-up not needed.          Vitals Value Taken Time   /84 05/03/22 0843   Temp 37.1 °C (98.7 °F) 05/03/22 0854   Pulse 76 05/03/22 0854   Resp 16 05/03/22 0854   SpO2 97 % 05/03/22 0854   Vitals shown include unvalidated device data.      Event Time   Out of Recovery 05/03/2022 09:07:00         Pain/Leno Score: Leno Score: 10 (5/3/2022  8:54 AM)

## 2022-05-04 ENCOUNTER — PATIENT OUTREACH (OUTPATIENT)
Dept: ADMINISTRATIVE | Facility: OTHER | Age: 47
End: 2022-05-04
Payer: MEDICARE

## 2022-05-04 VITALS
SYSTOLIC BLOOD PRESSURE: 123 MMHG | OXYGEN SATURATION: 95 % | WEIGHT: 205 LBS | HEIGHT: 72 IN | DIASTOLIC BLOOD PRESSURE: 60 MMHG | HEART RATE: 81 BPM | RESPIRATION RATE: 18 BRPM | TEMPERATURE: 99 F | BODY MASS INDEX: 27.77 KG/M2

## 2022-05-04 LAB
BASOPHILS # BLD AUTO: 0.01 K/UL (ref 0–0.2)
BASOPHILS NFR BLD: 0.2 % (ref 0–1.9)
DIFFERENTIAL METHOD: ABNORMAL
EOSINOPHIL # BLD AUTO: 0.1 K/UL (ref 0–0.5)
EOSINOPHIL NFR BLD: 1.7 % (ref 0–8)
ERYTHROCYTE [DISTWIDTH] IN BLOOD BY AUTOMATED COUNT: 15.4 % (ref 11.5–14.5)
HCT VFR BLD AUTO: 24 % (ref 40–54)
HGB BLD-MCNC: 8.2 G/DL (ref 14–18)
IMM GRANULOCYTES # BLD AUTO: 0.01 K/UL (ref 0–0.04)
IMM GRANULOCYTES NFR BLD AUTO: 0.2 % (ref 0–0.5)
LYMPHOCYTES # BLD AUTO: 1.1 K/UL (ref 1–4.8)
LYMPHOCYTES NFR BLD: 22.6 % (ref 18–48)
MCH RBC QN AUTO: 38.9 PG (ref 27–31)
MCHC RBC AUTO-ENTMCNC: 34.2 G/DL (ref 32–36)
MCV RBC AUTO: 114 FL (ref 82–98)
MONOCYTES # BLD AUTO: 0.6 K/UL (ref 0.3–1)
MONOCYTES NFR BLD: 11.8 % (ref 4–15)
NEUTROPHILS # BLD AUTO: 3 K/UL (ref 1.8–7.7)
NEUTROPHILS NFR BLD: 63.5 % (ref 38–73)
NRBC BLD-RTO: 0 /100 WBC
PLATELET # BLD AUTO: 41 K/UL (ref 150–450)
PMV BLD AUTO: 12.2 FL (ref 9.2–12.9)
RBC # BLD AUTO: 2.11 M/UL (ref 4.6–6.2)
WBC # BLD AUTO: 4.73 K/UL (ref 3.9–12.7)

## 2022-05-04 PROCEDURE — 25000003 PHARM REV CODE 250: Performed by: INTERNAL MEDICINE

## 2022-05-04 PROCEDURE — 63600175 PHARM REV CODE 636 W HCPCS: Performed by: INTERNAL MEDICINE

## 2022-05-04 PROCEDURE — 36415 COLL VENOUS BLD VENIPUNCTURE: CPT | Performed by: INTERNAL MEDICINE

## 2022-05-04 PROCEDURE — 25000003 PHARM REV CODE 250: Performed by: HOSPITALIST

## 2022-05-04 PROCEDURE — 85025 COMPLETE CBC W/AUTO DIFF WBC: CPT | Performed by: INTERNAL MEDICINE

## 2022-05-04 PROCEDURE — C9113 INJ PANTOPRAZOLE SODIUM, VIA: HCPCS | Performed by: INTERNAL MEDICINE

## 2022-05-04 PROCEDURE — 99239 PR HOSPITAL DISCHARGE DAY,>30 MIN: ICD-10-PCS | Mod: ,,, | Performed by: HOSPITALIST

## 2022-05-04 PROCEDURE — 99239 HOSP IP/OBS DSCHRG MGMT >30: CPT | Mod: ,,, | Performed by: HOSPITALIST

## 2022-05-04 RX ORDER — LANOLIN ALCOHOL/MO/W.PET/CERES
100 CREAM (GRAM) TOPICAL DAILY
Qty: 30 TABLET | Refills: 0 | Status: SHIPPED | OUTPATIENT
Start: 2022-05-05 | End: 2022-06-30 | Stop reason: SDUPTHER

## 2022-05-04 RX ORDER — PANTOPRAZOLE SODIUM 40 MG/1
40 TABLET, DELAYED RELEASE ORAL 2 TIMES DAILY
Qty: 60 TABLET | Refills: 0 | Status: SHIPPED | OUTPATIENT
Start: 2022-05-04 | End: 2022-06-30 | Stop reason: SDUPTHER

## 2022-05-04 RX ORDER — PANTOPRAZOLE SODIUM 40 MG/1
40 TABLET, DELAYED RELEASE ORAL DAILY
Status: DISCONTINUED | OUTPATIENT
Start: 2022-05-04 | End: 2022-05-04 | Stop reason: HOSPADM

## 2022-05-04 RX ORDER — FOLIC ACID 1 MG/1
1 TABLET ORAL DAILY
Qty: 30 TABLET | Refills: 0 | Status: SHIPPED | OUTPATIENT
Start: 2022-05-04 | End: 2022-06-30 | Stop reason: SDUPTHER

## 2022-05-04 RX ORDER — CARVEDILOL 6.25 MG/1
6.25 TABLET ORAL DAILY
Qty: 30 TABLET | Refills: 0 | Status: SHIPPED | OUTPATIENT
Start: 2022-05-04 | End: 2022-06-30 | Stop reason: SDUPTHER

## 2022-05-04 RX ADMIN — PANTOPRAZOLE SODIUM 40 MG: 40 INJECTION, POWDER, FOR SOLUTION INTRAVENOUS at 08:05

## 2022-05-04 RX ADMIN — FOLIC ACID 1 MG: 1 TABLET ORAL at 08:05

## 2022-05-04 RX ADMIN — THERA TABS 1 TABLET: TAB at 08:05

## 2022-05-04 RX ADMIN — SODIUM CHLORIDE, SODIUM LACTATE, POTASSIUM CHLORIDE, AND CALCIUM CHLORIDE: .6; .31; .03; .02 INJECTION, SOLUTION INTRAVENOUS at 02:05

## 2022-05-04 RX ADMIN — RIFAXIMIN 550 MG: 550 TABLET ORAL at 08:05

## 2022-05-04 RX ADMIN — OCTREOTIDE ACETATE 50 MCG/HR: 500 INJECTION, SOLUTION INTRAVENOUS; SUBCUTANEOUS at 12:05

## 2022-05-04 RX ADMIN — PANTOPRAZOLE SODIUM 40 MG: 40 TABLET, DELAYED RELEASE ORAL at 11:05

## 2022-05-04 RX ADMIN — THIAMINE HCL TAB 100 MG 100 MG: 100 TAB at 08:05

## 2022-05-04 NOTE — PLAN OF CARE
Problem: Adult Inpatient Plan of Care  Goal: Plan of Care Review  Outcome: Ongoing, Progressing  Goal: Patient-Specific Goal (Individualized)  Outcome: Ongoing, Progressing  Goal: Absence of Hospital-Acquired Illness or Injury  Outcome: Ongoing, Progressing  Goal: Optimal Comfort and Wellbeing  Outcome: Ongoing, Progressing  Goal: Readiness for Transition of Care  Outcome: Ongoing, Progressing     Problem: Adjustment to Illness (Gastrointestinal Bleeding)  Goal: Optimal Coping with Acute Illness  Outcome: Ongoing, Progressing     Problem: Bleeding (Gastrointestinal Bleeding)  Goal: Hemostasis  Outcome: Ongoing, Progressing     Problem: Adjustment to Illness (Sepsis/Septic Shock)  Goal: Optimal Coping  Outcome: Ongoing, Progressing     Problem: Bleeding (Sepsis/Septic Shock)  Goal: Absence of Bleeding  Outcome: Ongoing, Progressing     Problem: Glycemic Control Impaired (Sepsis/Septic Shock)  Goal: Blood Glucose Level Within Desired Range  Outcome: Ongoing, Progressing

## 2022-05-04 NOTE — PLAN OF CARE
LMSW contacted PCP office for PT 7 day follow up appt. [facility] will contact PT later today to establish appt, they will reach out to LMSW as well.

## 2022-05-04 NOTE — SUBJECTIVE & OBJECTIVE
Interval History: Status post EGD without evidence of overt bleeding.    Review of Systems   Constitutional:  Negative for chills and fever.   Respiratory:  Negative for shortness of breath.    Cardiovascular:  Negative for chest pain.   Gastrointestinal:  Negative for abdominal pain, constipation, diarrhea, nausea and vomiting.   Objective:     Vital Signs (Most Recent):  Temp: 99 °F (37.2 °C) (05/03/22 1935)  Pulse: 82 (05/03/22 2000)  Resp: 18 (05/03/22 1935)  BP: (!) 141/86 (05/03/22 1935)  SpO2: 95 % (05/03/22 1935)   Vital Signs (24h Range):  Temp:  [98.7 °F (37.1 °C)-99.6 °F (37.6 °C)] 99 °F (37.2 °C)  Pulse:  [74-88] 82  Resp:  [16-18] 18  SpO2:  [92 %-100 %] 95 %  BP: (122-157)/(63-94) 141/86     Weight: 93 kg (205 lb)  Body mass index is 27.8 kg/m².    Intake/Output Summary (Last 24 hours) at 5/3/2022 2134  Last data filed at 5/3/2022 1900  Gross per 24 hour   Intake 1620 ml   Output 2350 ml   Net -730 ml      Physical Exam  Cardiovascular:      Rate and Rhythm: Normal rate and regular rhythm.      Heart sounds: Normal heart sounds. No murmur heard.    No friction rub. No gallop.   Pulmonary:      Effort: Pulmonary effort is normal. No respiratory distress.      Breath sounds: Normal breath sounds. No wheezing or rales.   Abdominal:      General: Bowel sounds are normal. There is no distension.      Palpations: Abdomen is soft.      Tenderness: There is no abdominal tenderness.   Musculoskeletal:         General: No tenderness. Normal range of motion.   Skin:     General: Skin is warm and dry.      Coloration: Skin is not pale.      Findings: No erythema or rash.   Neurological:      Mental Status: He is alert and oriented to person, place, and time.      Motor: Tremor present.       Significant Labs: All pertinent labs within the past 24 hours have been reviewed.    Significant Imaging: I have reviewed all pertinent imaging results/findings within the past 24 hours.

## 2022-05-04 NOTE — PLAN OF CARE
LMSW scheduled follow up appts with PCP who will reach out to PT for scheduling.  GI appt scheduled for 1:40 tomorrow with Dr. Medeiros.  Soonest Nephrology appt scheduled for June 30th at 2:30 with Dr. Parra.  PT ready for D/C.

## 2022-05-04 NOTE — PLAN OF CARE
Free from falls, injury, or skin breakdown this hospital admission.  Pt eager & in agreement w/ DC. VU of DC instructions and the need to attend follow-up appointments--paperwork passed & explained--scripts called to pharmacy per MD. IV removed w/ cath tip intact, WNL. Voiding, ambulating, & tolerating PO well.To be DCd home --will be escorted downstairs via  transport team once dressed, ready & ride arrives. Pt discharged in no distress.

## 2022-05-04 NOTE — PROGRESS NOTES
"[facility] Medicine  Progress Note    Patient Name: Irvin Diaz Jr.  MRN: [id number]  Patient Class: IP- Inpatient   Admission Date: 2022  Length of Stay: 1 days  Attending Physician: Cyrus Gauthier MD  Primary Care Provider: Shalonda Snyder MD        Subjective:     Principal Problem:Hematemesis        HPI:  Patient is a 46 year old male with a PMH significant for Alcohol Abuse complicated by Cirrhosis, Esophageal Varices with history of GIB, PE, HTN, HFpEF, Thrombocytopenia, JAY, CKD3 who presents with N/V and hematemesis that began early this morning.  Patient was sober from alcohol for about 2 months, but relapsed 2 weeks ago after his brother  from a stroke.  Patient is currently drinking a "bottle of gin" daily with his las drink about 2 days PTA.  Patient has had admissions for withdrawal in past.  Patient is awake and alert, and oriented.  He is still with some nausea, but emesis has resolved.  Mild epigastric pain.  Denies f/c, diarrhea.  No dysuria.  No chest pain or SOB.  Has a mild headache.  He was treated with Ativan 2mg IV x 2, NS x 2L.  CIWA at the time of my interview was 8.      Overview/Hospital Course:  Patient presented with N/V with Coffee Ground Emesis which began early morning on the day of admission.  Hgb on admission was 8.8 (was 8.7 in 3/2022).  Patient with history of Alcohol-associated Cirrhosis.  EGD in 2021 with small esophageal varices and portal hypertensive gastropathy.  Vitals significant for tachycardia, but no hypotension.  He was started on IV Ceftriaxone, IV Octreotide, IV Protonix.  GI was consulted.   Hgb stable at 8.0.  Plts dropped to 37k and was transfused 1 unit of Plts.  Ethanol level <10.  Tox screen negative on admission.  Given a total of Ativan 4mg in ED.  CIWA at time of my interview was 8.  Last drink was 48 hours PTA.  Drinking a bottle of gin daily for past 2 weeks.  History of ETOH WD, but denies " seizures.  Received IV Valium 10mg x 2 overnight.  CIWA <8 this morning.  Emesis resolved and withdrawal symptoms improved.      Interval History: Status post EGD without evidence of overt bleeding.    Review of Systems   Constitutional:  Negative for chills and fever.   Respiratory:  Negative for shortness of breath.    Cardiovascular:  Negative for chest pain.   Gastrointestinal:  Negative for abdominal pain, constipation, diarrhea, nausea and vomiting.   Objective:     Vital Signs (Most Recent):  Temp: 99 °F (37.2 °C) (05/03/22 1935)  Pulse: 82 (05/03/22 2000)  Resp: 18 (05/03/22 1935)  BP: (!) 141/86 (05/03/22 1935)  SpO2: 95 % (05/03/22 1935)   Vital Signs (24h Range):  Temp:  [98.7 °F (37.1 °C)-99.6 °F (37.6 °C)] 99 °F (37.2 °C)  Pulse:  [74-88] 82  Resp:  [16-18] 18  SpO2:  [92 %-100 %] 95 %  BP: (122-157)/(63-94) 141/86     Weight: 93 kg (205 lb)  Body mass index is 27.8 kg/m².    Intake/Output Summary (Last 24 hours) at 5/3/2022 2134  Last data filed at 5/3/2022 1900  Gross per 24 hour   Intake 1620 ml   Output 2350 ml   Net -730 ml      Physical Exam  Cardiovascular:      Rate and Rhythm: Normal rate and regular rhythm.      Heart sounds: Normal heart sounds. No murmur heard.    No friction rub. No gallop.   Pulmonary:      Effort: Pulmonary effort is normal. No respiratory distress.      Breath sounds: Normal breath sounds. No wheezing or rales.   Abdominal:      General: Bowel sounds are normal. There is no distension.      Palpations: Abdomen is soft.      Tenderness: There is no abdominal tenderness.   Musculoskeletal:         General: No tenderness. Normal range of motion.   Skin:     General: Skin is warm and dry.      Coloration: Skin is not pale.      Findings: No erythema or rash.   Neurological:      Mental Status: He is alert and oriented to person, place, and time.      Motor: Tremor present.       Significant Labs: All pertinent labs within the past 24 hours have been reviewed.    Significant  Imaging: I have reviewed all pertinent imaging results/findings within the past 24 hours.        Assessment/Plan:      * Hematemesis  Hemoglobin stable and EGD revealed not revealed esophageal varices but without evidence of acute bleeding.  Starting oral diet.  Will advance as tolerated.      Alcohol withdrawal  Ethanol level <10.  Tox screen negative on admission.  Given a total of Ativan 4mg in ED.  CIWA at time of my interview was 8.  Last drink was 48 hours PTA.  Drinking a bottle of gin daily for past 2 weeks.  History of ETOH WD, but denies seizures.  Received IV Valium 10mg x 2 overnight.  CIWA <8 this morning.  -Continue with CIWA-Ar q4 with IV Valium 10mg for CIWA >8  -Thiamine/Folic Acid/MVI  -Continue with IV fluids  -Fall and Seizure precautions      Alcoholic ketoacidosis  Labs on admission with AG 33, 1+ ketones on UA.  Glucose 51.  Started D5 1/2 NS.  Glucose now elevated.  -Change IV fluids to LR this morning        CKD (chronic kidney disease) stage 3, GFR 30-59 ml/min  Creatinine 2.0 on admission with baseline around 1.6-2.0 over past year.  -Renal Dose Medications  -Avoid Nephrotoxins      Macrocytic anemia  Hgb 8.8 on admission with last Hgb 8.7 in 3/2022.  MCV increased at 122.  Fe studies with Ferritin 309 with Fe sat of 72%.  B12/Folate normal.  -Follow      Alcoholic cirrhosis of liver  Followed by [facility] and last seen in 12/2021.  CT A/P done in 3/2022 with findings of cirrhosis and mild abdominal ascites as described essentially stable.  Last EGD done in 12/2021 with small esophageal varices and portal hypertensive gastropathy as above.  Labs on admission with INR 1.3, T Bili of 5.6, AST/ALT of 95/31.  No evidence of significant encephalopathy at this time.  MELD-Na on admission was 23 (7-10% 90-day mortality).  Home Meds:  Rifaximin 500mg bid  -Continue Rifaximin      Thrombocytopenia  Stable.  Monitor for further bleeding.        VTE Risk Mitigation (From admission, onward)          Ordered     IP VTE HIGH RISK PATIENT  Once         05/01/22 1158     Place sequential compression device  Until discontinued         05/01/22 1158                  Cyrus Gauthier MD  Department of Hospital Medicine   [facility]

## 2022-05-04 NOTE — PROGRESS NOTES
IP Liaison - Final Visit Note    Patient: Irvin Diaz Jr.  MRN:  [id number]  Date of Service:  5/4/2022  Completed by:  WILBER Cota    Reason for Visit   Patient presents with    IP Liaison Follow-up Visit       The following were addressed during this visit:  - Complete initial visit with patient   - Complete follow-up visit with patient        Patient Summary     Discharge Date: 5/4/2022  Discharge telephone number/address: [phone]/[address] [place] [phone]  Follow up provider: Claudio Medeiros MD  Follow up appointments: 5/5/2022, 1:40pm  Home Health agency & telephone number: n/a  DME ordered &  name: n/a  Assigned OPCM RN/SW: n/a  Report sent to follow up team (PCP/OPCM) via in basket message: n/a  Community Resources arranged including agency name & contact info: n/a    WILBER Cota

## 2022-05-05 ENCOUNTER — PATIENT OUTREACH (OUTPATIENT)
Dept: ADMINISTRATIVE | Facility: CLINIC | Age: 47
End: 2022-05-05
Payer: MEDICARE

## 2022-05-05 ENCOUNTER — OFFICE VISIT (OUTPATIENT)
Dept: GASTROENTEROLOGY | Facility: CLINIC | Age: 47
End: 2022-05-05
Payer: MEDICARE

## 2022-05-05 VITALS
WEIGHT: 191.69 LBS | BODY MASS INDEX: 25.96 KG/M2 | DIASTOLIC BLOOD PRESSURE: 86 MMHG | HEART RATE: 80 BPM | SYSTOLIC BLOOD PRESSURE: 150 MMHG | OXYGEN SATURATION: 98 % | HEIGHT: 72 IN

## 2022-05-05 DIAGNOSIS — K25.9 GASTRIC ULCER, UNSPECIFIED CHRONICITY, UNSPECIFIED WHETHER GASTRIC ULCER HEMORRHAGE OR PERFORATION PRESENT: Primary | ICD-10-CM

## 2022-05-05 DIAGNOSIS — K70.30 ALCOHOLIC CIRRHOSIS, UNSPECIFIED WHETHER ASCITES PRESENT: ICD-10-CM

## 2022-05-05 DIAGNOSIS — K92.0 HEMATEMESIS, PRESENCE OF NAUSEA NOT SPECIFIED: Primary | ICD-10-CM

## 2022-05-05 PROCEDURE — 99999 PR PBB SHADOW E&M-EST. PATIENT-LVL III: ICD-10-PCS | Mod: PBBFAC,,, | Performed by: INTERNAL MEDICINE

## 2022-05-05 PROCEDURE — 99214 PR OFFICE/OUTPT VISIT, EST, LEVL IV, 30-39 MIN: ICD-10-PCS | Mod: S$PBB,,, | Performed by: INTERNAL MEDICINE

## 2022-05-05 PROCEDURE — 99999 PR PBB SHADOW E&M-EST. PATIENT-LVL III: CPT | Mod: PBBFAC,,, | Performed by: INTERNAL MEDICINE

## 2022-05-05 PROCEDURE — 99214 OFFICE O/P EST MOD 30 MIN: CPT | Mod: S$PBB,,, | Performed by: INTERNAL MEDICINE

## 2022-05-05 PROCEDURE — 99213 OFFICE O/P EST LOW 20 MIN: CPT | Mod: PBBFAC,PN | Performed by: INTERNAL MEDICINE

## 2022-05-05 RX ORDER — TRAMADOL HYDROCHLORIDE 50 MG/1
50 TABLET ORAL EVERY 6 HOURS PRN
COMMUNITY
End: 2022-05-30 | Stop reason: SDUPTHER

## 2022-05-05 RX ORDER — POTASSIUM CHLORIDE 20 MEQ/1
20 TABLET, EXTENDED RELEASE ORAL DAILY
COMMUNITY
Start: 2022-02-18 | End: 2022-06-03 | Stop reason: SDUPTHER

## 2022-05-05 NOTE — PROGRESS NOTES
Subjective:       Patient ID: Irvin Diaz Jr. is a 46 y.o. male.    Chief Complaint: Follow-up and Results (EGD)    Patient here today to follow-up with aforementioned complaints.  He was previously seen by me in December with similar complaints.  At that time he was started on rifaximin and sent for EGD to document healing of gastric ulcer.  EGD was performed later that month and showed interval resolution of of ulcer.  However more recently patient was admitted to [facility] [place] operated by [facility] with c/o hematemesis. EGD was performed which  underwent EGD earlier this week in [facility] during hospitalization for hematemsias, again with ulcer noted. Today patient reports doing well. Denies further bleeding. Reports compliance with rifaximin. Referral to hepatology pending.    Review of Systems   Gastrointestinal: Negative for abdominal distention, abdominal pain and blood in stool.       The following portions of the patient's history were reviewed and updated as appropriate: allergies, current medications, past family history, past medical history, past social history, past surgical history and problem list.    Objective:      Physical Exam  Constitutional:       Appearance: He is well-developed.   HENT:      Head: Normocephalic and atraumatic.   Eyes:      Conjunctiva/sclera: Conjunctivae normal.   Pulmonary:      Effort: Pulmonary effort is normal. No respiratory distress.   Neurological:      Mental Status: He is alert and oriented to person, place, and time.   Psychiatric:         Behavior: Behavior normal.         Thought Content: Thought content normal.         Judgment: Judgment normal.           Pertinent labs and imaging studies reviewed    Assessment:       No diagnosis found.    Plan:       Continue PPI BID x 8 weeks then daily there after  Establish care with Hepatology  Continue rifaximin    35 minutes of total time spent on the encounter, which includes face to face time and non-face to face time preparing to see the  patient (eg, review of tests), Obtaining and/or reviewing separately obtained history, Documenting clinical information in the electronic or other health record, Independently interpreting results (not separately reported) and communicating results to the patient/family/caregiver, or Care coordination (not separately reported).     (Portions of this note were dictated using voice recognition software and may contain dictation related errors in spelling/grammar/syntax not found on text review)

## 2022-05-05 NOTE — PHYSICIAN QUERY
PT Name: Irvin Diaz Jr.  MR #: [id number]     DOCUMENTATION CLARIFICATION      CDS/: Loraine Call               Contact information: [email or web address]  This form is a permanent document in the medical record.     Query Date: May 5, 2022    By submitting this query, we are merely seeking further clarification of documentation.  Please utilize your independent clinical judgment when addressing the question(s) below.     The Medical Record contains the following:    Clinical Information Location in Medical Record   Status post EGD without evidence of overt bleeding.    Hematemesis  Hemoglobin stable and EGD revealed not revealed esophageal varices but without evidence of acute bleeding.    Alcoholic cirrhosis of liver  Last EGD done in 12/2021 with small esophageal varices and portal hypertensive gastropathy as above.   [facility] Medicine PN 05/03   Upper endoscopy that was uncomplicated.  Findings:  1) Grade 1 lower esophageal varices, 3 cords, flat, no stigmata of blood losss  2) Distal Grade A esophagitis  3) Diffuse portal hypertension related gastropathy, no active bleeding. Nodular antral gastritis with small non-bleeding ulcer noted with a  Clean base  4) Duodenitis     Plan: serial labs, same medications, close observation for withdrawal symptoms.   GI PN 05/03   Presents with with complaints of nausea/vomiting and hematemesis that started AM of presentation.  Long standing history of ETOH cirrhosis with recent relapse.  He had an EGD on 12/21/21showing small esophageal varices, moderate portal hypertensive gastropathy.  He also has a known history of gastric ulcer seen on EGD 7/19/21.    GI Consult Note 05/02   Findings:   Grade I varices were found in the lower third of the esophagus.  LA Grade A (one or more mucosal breaks less than 5 mm, not extending between tops of 2 mucosal folds) esophagitis with no bleeding was found in the lower third of the esophagus.   Diffuse moderate  inflammation characterized by congestion (edema), erythema and mucus was found in the entire examined stomach.   One non-bleeding superficial gastric ulcer with no stigmata of bleeding was found in the gastric antrum. The lesion was 2 mm in largest dimension.   Diffuse mild inflammation characterized by congestion (edema) and erythema was found in the duodenal bulb.     Impression:  Grade I esophageal varices.   LA Grade A reflux esophagitis with no bleeding.   Gastritis.   Non-bleeding gastric ulcer with no stigmata of bleeding.   Duodenitis.   No specimens collected.    EGD Report 05/03       Please document your best medical opinion regarding the etiology of _Hematemesis_?  Likely multifactorial    [   ]  Esophageal Varicies   [x   ] Esophagitis   [x   ] Gastritis   [ x  ] Portal Hypertensive Gastropathy    [  ] Gastric Ulcer    [   ] Acute  [   ] Chronic  [   ] Acuity/Chronicity Clinically Undetermined   [   ] Duodenitis   [   ] Other etiology (please specify):___________________   [  ] Clinically Undetermined             Please document in your progress notes daily for the duration of treatment, until resolved, and include in your discharge summary.

## 2022-05-05 NOTE — PROGRESS NOTES
C3 nurse spoke with Irvin Diaz Jr.  for a TCC post hospital discharge follow up call. The patient reports does not have a scheduled HOSFU appointment. C3 nurse was unable to schedule [facility]. Patient advised to contact their PCP to schedule a HOSPFU within 7 days.

## 2022-05-07 ENCOUNTER — DOCUMENTATION ONLY (OUTPATIENT)
Dept: TRANSPLANT | Facility: CLINIC | Age: 47
End: 2022-05-07
Payer: MEDICARE

## 2022-05-07 NOTE — NURSING
Pt records reviewed.   Pt will be referred to Hepatology.  Hepatic cirrhosis, unspecified hepatic cirrhosis type, unspecified whether ascites present     Initial referral received  from the workque.   Referring provider  Cyrus Gauthier MD      Referral letter sent to patient.

## 2022-05-07 NOTE — LETTER
May 7, 2022    Irvin Diaz  [address] [place] [id number]      Dear Irvin Diaz:    Your doctor has referred you to the [facility]. We are sending this letter to remind you to make an appointment with us to complete the referral process.     Please call us at [phone] to schedule an appointment. We look forward to seeing you soon.     If you received a call and have been scheduled, please disregard this letter.       Sincerely,        [facility]   [facility] [place], [place] [id number] [phone]

## 2022-05-09 ENCOUNTER — TELEPHONE (OUTPATIENT)
Dept: TRANSPLANT | Facility: CLINIC | Age: 47
End: 2022-05-09
Payer: MEDICARE

## 2022-05-09 ENCOUNTER — PES CALL (OUTPATIENT)
Dept: ADMINISTRATIVE | Facility: CLINIC | Age: 47
End: 2022-05-09
Payer: MEDICARE

## 2022-05-09 NOTE — TELEPHONE ENCOUNTER
Patient called to confirm his appointment with Dr Arreaga at 10am on 5/10/22.  Patient stated that he would be present for the appointment .

## 2022-05-10 ENCOUNTER — PES CALL (OUTPATIENT)
Dept: ADMINISTRATIVE | Facility: CLINIC | Age: 47
End: 2022-05-10
Payer: MEDICARE

## 2022-05-10 ENCOUNTER — OFFICE VISIT (OUTPATIENT)
Dept: HEPATOLOGY | Facility: CLINIC | Age: 47
End: 2022-05-10
Payer: MEDICARE

## 2022-05-10 VITALS
HEART RATE: 86 BPM | BODY MASS INDEX: 24.95 KG/M2 | WEIGHT: 184 LBS | DIASTOLIC BLOOD PRESSURE: 90 MMHG | OXYGEN SATURATION: 99 % | SYSTOLIC BLOOD PRESSURE: 157 MMHG

## 2022-05-10 DIAGNOSIS — K74.60 HEPATIC CIRRHOSIS, UNSPECIFIED HEPATIC CIRRHOSIS TYPE, UNSPECIFIED WHETHER ASCITES PRESENT: ICD-10-CM

## 2022-05-10 PROCEDURE — 99205 PR OFFICE/OUTPT VISIT, NEW, LEVL V, 60-74 MIN: ICD-10-PCS | Mod: S$PBB,,, | Performed by: INTERNAL MEDICINE

## 2022-05-10 PROCEDURE — 99205 OFFICE O/P NEW HI 60 MIN: CPT | Mod: S$PBB,,, | Performed by: INTERNAL MEDICINE

## 2022-05-10 PROCEDURE — 99999 PR PBB SHADOW E&M-EST. PATIENT-LVL III: ICD-10-PCS | Mod: PBBFAC,,, | Performed by: INTERNAL MEDICINE

## 2022-05-10 PROCEDURE — 99999 PR PBB SHADOW E&M-EST. PATIENT-LVL III: CPT | Mod: PBBFAC,,, | Performed by: INTERNAL MEDICINE

## 2022-05-10 PROCEDURE — 99213 OFFICE O/P EST LOW 20 MIN: CPT | Mod: PBBFAC | Performed by: INTERNAL MEDICINE

## 2022-05-10 NOTE — PROGRESS NOTES
Subjective:       Patient ID: Irvin Diaz Jr. is a 46 y.o. male.    Chief Complaint: No chief complaint on file.    HPI  I saw this 46 y.o. 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  He came to clinic with his mother.    He was previously followed by GI at [facility] but has recently been coming to [facility].    He has had some minor variceal bleeds in the past and his last admission was in the beginning of May 2022 at [facility]. Last banded in Aug 2020 but his varices have since been characterized as small.    He does not report any edema or ascites but his imaging does show abdominal fluid.  No episodes of HE.    EGD: 5/3/22  - Grade I esophageal varices.                          - LA Grade A reflux esophagitis with no bleeding.                          - Gastritis.                          - Non-bleeding gastric ulcer with no stigmata of                          bleeding.                          - Duodenitis.     CT abdo: 3/7/2022  Mild circumferential wall thickening involving the proximal colon extending from the ileocolic anastomosis at the hepatic flexure through around the level of the splenic flexure suggests inflammatory or infectious colitis.  No convincing transmural inflammation is seen.  Postoperative changes of proximal colectomy and scattered mild diverticulosis of the more distal colon.  No convincing diverticulitis, bowel obstruction, free air or abscess.  Findings of cirrhosis and mild abdominal ascites as described essentially stable.  Left hip findings suggesting AVN as described.    Alcohol hx:  - 1/5 of gin per day  - completed outpatient rehab at [facility] and managed to stay off alcohol for 5 months but relapsed about 1 month ago.  - stopped drinking 2 weeks ago (Late April 2022).    No DUI  No legal troubles with alcohol      MELD-Na score: 25 at 5/2/2022  7:52 AM  MELD score: 24 at 5/2/2022  7:52 AM  Calculated from:  Serum Creatinine: 2.3 mg/dL at 5/2/2022  7:52  AM  Serum Sodium: 136 mmol/L at 5/2/2022  7:52 AM  Total Bilirubin: 5.3 mg/dL at 5/2/2022  7:52 AM  INR(ratio): 1.3 at 5/1/2022 11:54 AM  Age: 46 years    PMH:  Colon resection- 2007- mass- not Ca  Diverticulitis  CKD  Hypertension  Hip OA  PE- 2018- stopped warfarin due to GI bleed      SH:  Disabled  Ex-smoker (1 pack robert day for 3 years in his 20s)  Lives with mother  6 kids- healthy    FH:  Nil liver      Review of Systems   Constitutional: Negative for activity change, appetite change, chills, fatigue, fever and unexpected weight change.   HENT: Negative for hearing loss.    Eyes: Negative for discharge and visual disturbance.   Respiratory: Negative for cough, chest tightness, shortness of breath and wheezing.    Cardiovascular: Negative for chest pain, palpitations and leg swelling.   Gastrointestinal: Negative for abdominal distention, abdominal pain, constipation, diarrhea and nausea.   Genitourinary: Negative for dysuria and frequency.   Musculoskeletal: Negative for arthralgias and back pain.   Skin: Negative for pallor and rash.   Neurological: Negative for dizziness, tremors, speech difficulty and headaches.   Hematological: Negative for adenopathy.   Psychiatric/Behavioral: Negative for agitation and confusion.         Lab Results   Component Value Date    ALT 33 05/02/2022     (H) 05/02/2022    ALKPHOS 78 05/02/2022    BILITOT 5.3 (H) 05/02/2022     Past Medical History:   Diagnosis Date    Alcoholic cirrhosis of liver     Arthritis     ATN (acute tubular necrosis)     CHF (congestive heart failure)     Diverticulitis 01/2020    Esophageal varices     GERD (gastroesophageal reflux disease)     GI bleed     Hip arthritis     Left    Hypertension     Liver cirrhosis     Macrocytic anemia     Pulmonary embolism 08/2018    Unprovoked DVT.  Stop Coumadin due to GIB    Thrombocytopenia      Past Surgical History:   Procedure Laterality Date    ANKLE SURGERY      COLON SURGERY  2007     COLONOSCOPY  09/05/2019    [facility]    COLONOSCOPY N/A 2/17/2021    Procedure: COLONOSCOPY;  Surgeon: Renea Blilingsley MD;  Location: [facility];  Service: Endoscopy;  Laterality: N/A;    COLONOSCOPY W/ BIOPSIES  02/17/2021    ESOPHAGOGASTRODUODENOSCOPY N/A 7/26/2019    Procedure: EGD (ESOPHAGOGASTRODUODENOSCOPY);  Surgeon: Brian Trivedi MD;  Location: [facility];  Service: Endoscopy;  Laterality: N/A;    ESOPHAGOGASTRODUODENOSCOPY N/A 4/8/2020    Procedure: EGD (ESOPHAGOGASTRODUODENOSCOPY);  Surgeon: Donn Acuna MD;  Location: [facility];  Service: Endoscopy;  Laterality: N/A;    ESOPHAGOGASTRODUODENOSCOPY N/A 1/3/2021    Procedure: EGD (ESOPHAGOGASTRODUODENOSCOPY)- coffee ground emesis, hx varices;  Surgeon: Steve Chairez MD;  Location: [facility];  Service: Endoscopy;  Laterality: N/A;    ESOPHAGOGASTRODUODENOSCOPY N/A 5/3/2021    Procedure: EGD (ESOPHAGOGASTRODUODENOSCOPY);  Surgeon: Jeanmarie Ngo MD;  Location: [facility];  Service: Endoscopy;  Laterality: N/A;    ESOPHAGOGASTRODUODENOSCOPY N/A 7/19/2021    Procedure: ESOPHAGOGASTRODUODENOSCOPY (EGD);  Surgeon: Claudio Medeiros MD;  Location: [facility];  Service: Endoscopy;  Laterality: N/A;    ESOPHAGOGASTRODUODENOSCOPY  12/20/2021    ESOPHAGOGASTRODUODENOSCOPY N/A 12/20/2021    Procedure: EGD (ESOPHAGOGASTRODUODENOSCOPY);  Surgeon: Ajay Jackson MD;  Location: [facility];  Service: Endoscopy;  Laterality: N/A;    ESOPHAGOGASTRODUODENOSCOPY N/A 5/3/2022    Procedure: EGD (ESOPHAGOGASTRODUODENOSCOPY);  Surgeon: Brian Trivedi MD;  Location: [facility];  Service: Endoscopy;  Laterality: N/A;    HERNIA REPAIR Left     Inguinal     Current Outpatient Medications   Medication Sig    carvediloL (COREG) 6.25 MG tablet Take 1 tablet (6.25 mg total) by mouth Daily.    folic acid (FOLVITE) 1 MG tablet Take 1 tablet (1 mg total) by mouth once daily.    multivitamin Tab Take 1 tablet by mouth once daily.    pantoprazole (PROTONIX) 40  MG tablet Take 1 tablet (40 mg total) by mouth 2 (two) times daily.    potassium chloride SA (K-DUR,KLOR-CON) 20 MEQ tablet Take 20 mEq by mouth once daily.    rifAXIMin (XIFAXAN) 550 mg Tab Take 1 tablet (550 mg total) by mouth 2 (two) times daily.    thiamine 100 MG tablet Take 1 tablet (100 mg total) by mouth once daily.    traMADoL (ULTRAM) 50 mg tablet Take 50 mg by mouth every 6 (six) hours as needed.     No current facility-administered medications for this visit.       Objective:      Physical Exam  HENT:      Head: Normocephalic.   Eyes:      Pupils: Pupils are equal, round, and reactive to light.   Neck:      Thyroid: No thyromegaly.   Cardiovascular:      Rate and Rhythm: Normal rate and regular rhythm.      Heart sounds: Normal heart sounds.   Pulmonary:      Effort: Pulmonary effort is normal.      Breath sounds: Normal breath sounds. No wheezing.   Abdominal:      General: There is distension.      Palpations: Abdomen is soft. There is no mass.      Tenderness: There is no abdominal tenderness.      Comments: Mild ascites   Lymphadenopathy:      Cervical: No cervical adenopathy.   Skin:     General: Skin is warm.      Findings: No erythema or rash.   Neurological:      Mental Status: He is alert and oriented to person, place, and time.   Psychiatric:         Behavior: Behavior normal.           Assessment:       1. Hepatic cirrhosis, unspecified hepatic cirrhosis type, unspecified whether ascites present        Plan:   This 46 year old man has decompensated alcohol related cirrhosis with jaundice and mild ascites. His MELD score is 25 ,ainly driven by his bilirubin and his chronically elevated creatinine.    I explained to him that his liver function is severely imparied but can recover with prolonged abstinence. Explained that he should aim for liflong complete abstinence.    - suggested attending rehab again- and he is in process of contacting [facility] 2 weeks + 4  weeks  - clinic in 6 weeks and we will reassess his candidacy for liver transplant at that point.

## 2022-05-11 ENCOUNTER — PES CALL (OUTPATIENT)
Dept: ADMINISTRATIVE | Facility: CLINIC | Age: 47
End: 2022-05-11
Payer: MEDICARE

## 2022-05-11 NOTE — DISCHARGE SUMMARY
"[facility], [place], operated by [facility]  Discharge Summary      Patient Name: Irvin Diaz Jr.  MRN: [id number]  Patient Class: IP- Inpatient  Admission Date: 2022  Hospital Length of Stay: 2 days  Discharge Date and Time: 2022  3:07 PM  Attending Physician: Cyrus Gauthier MD  Discharging Provider: Cyrus Gauthier MD  Primary Care Provider: Shalonda Snyder MD      HPI:   Per Dr. Mejia. REJI Becker:    "Patient is a 46 year old male with a PMH significant for Alcohol Abuse complicated by Cirrhosis, Esophageal Varices with history of GIB, PE, HTN, HFpEF, Thrombocytopenia, JAY, CKD3 who presents with N/V and hematemesis that began early this morning.  Patient was sober from alcohol for about 2 months, but relapsed 2 weeks ago after his brother  from a stroke.  Patient is currently drinking a "bottle of gin" daily with his las drink about 2 days PTA.  Patient has had admissions for withdrawal in past.  Patient is awake and alert, and oriented.  He is still with some nausea, but emesis has resolved.  Mild epigastric pain.  Denies f/c, diarrhea.  No dysuria.  No chest pain or SOB.  Has a mild headache.  He was treated with Ativan 2mg IV x 2, NS x 2L.  CIWA at the time of my interview was 8."      Procedure(s) (LRB):  EGD (ESOPHAGOGASTRODUODENOSCOPY) (N/A)      Hospital Course:   Patient is a 46-year-old man with history of alcohol abuse complicated by alcoholic cirrhosis, esophageal varices, with prior history of gastrointestinal bleeding, hypertension, chronic thrombocytopenia, pulmonary embolism, chronic diastolic heart failure, chronic kidney disease stage 3 who was admitted to the hospital with evidence of acute upper gastrointestinal bleeding along with evidence of alcohol withdrawal.  Patient treated with intravenous ceftriaxone, octreotide, and pantoprazole.  Patient also transfuse 1 dose of platelets.    Patient given doses of benzodiazepines as needed to treat alcohol " withdrawal.  Gastroenterology service consulted recommended upper endoscopy which was performed and revealed esophageal varices, esophagitis, gastritis, and a nonbleeding gastric ulcer, and duodenitis.  No stigmata of bleeding noted with gastric ulcer.  Suspect bleeding secondary to esophagitis, gastritis, complicating portal hypertensive gastropathy.  Patient had resolution of bleeding with stable serial hemoglobin levels.  Patient transitioned to oral pantoprazole and further intravenous octreotide and ceftriaxone was discontinued as it was not felt that his bleeding was secondary to esophageal varices.  Patient weaned off benzodiazepines with resolution of withdrawal symptoms.  Patient counseled on the critical importance of abstaining from further alcohol abuse.  Close outpatient follow-up with primary care physician physician for ongoing management of chronic medical problems along with follow-up with gastroenterology clinic for repeat endoscopy to ensure resolution of inflamed upper gastrointestinal tract and healing of his gastric ulcer advised.       Goals of Care Treatment Preferences:  Code Status: Full Code      Consults:   Consults (From admission, onward)        Status Ordering Provider     Inpatient consult to Gastroenterology  Once        Provider:  Rebel Valencia MD    Completed ELDA FRANCES          Final Active Diagnoses:    Diagnosis Date Noted POA    PRINCIPAL PROBLEM:  Hematemesis [K92.0] 05/03/2021 Yes    Alcohol withdrawal [F10.239] 05/01/2022 Yes    Alcoholic ketoacidosis [E87.2] 06/06/2021 Yes    CKD (chronic kidney disease) stage 3, GFR 30-59 ml/min [N18.30] 04/08/2020 Yes    Alcoholic cirrhosis of liver [K70.30]  Yes    Macrocytic anemia [D53.9]  Yes    Thrombocytopenia [D69.6] 05/11/2019 Yes      Problems Resolved During this Admission:       Discharged Condition: Stable    Disposition: Home or Self Care    Follow Up:   Follow-up Information     Claudio Medeiros MD  Follow up in 1 day(s).    Specialties: Gastroenterology, Hospitalist  Why: Please attend your appointment tomorrow with Dr. Medeiros at 1:40 pm  Contact information:  [address]  [address]  [facility] [address] [phone]             Garth Morales MD Follow up on 6/30/2022.    Specialty: Internal Medicine  Why: Please attend this appointment at 2:30 pm  Contact information:  [address] [address]                       Patient Instructions:      Ambulatory referral/consult to Nephrology   Standing Status: Future   Referral Priority: Urgent Referral Type: Consultation   Referral Reason: Specialty Services Required   Requested Specialty: Nephrology   Number of Visits Requested: 1     Ambulatory referral/consult to Hepatology   Standing Status: Future   Referral Priority: Urgent Referral Type: Consultation   Referral Reason: Specialty Services Required   Requested Specialty: Hepatology   Number of Visits Requested: 1     Ambulatory referral/consult to Addiction Psychiatry   Standing Status: Future   Referral Priority: Urgent Referral Type: Psychiatric   Referral Reason: Specialty Services Required   Requested Specialty: Addiction Medicine   Number of Visits Requested: 1     Diet Adult Regular     Order Specific Question Answer Comments   Na restriction, if any: 2gNa      Activity as tolerated        Medications:  Reconciled Home Medications:      Medication List      START taking these medications    carvediloL 6.25 MG tablet  Commonly known as: COREG  Take 1 tablet (6.25 mg total) by mouth Daily.     multivitamin Tab  Take 1 tablet by mouth once daily.     thiamine 100 MG tablet  Take 1 tablet (100 mg total) by mouth once daily.        CONTINUE taking these medications    folic acid 1 MG tablet  Commonly known as: FOLVITE  Take 1 tablet (1 mg total) by mouth once daily.     pantoprazole 40 MG tablet  Commonly known as: PROTONIX  Take 1 tablet (40 mg total) by mouth 2 (two) times  daily.     rifAXIMin 550 mg Tab  Commonly known as: XIFAXAN  Take 1 tablet (550 mg total) by mouth 2 (two) times daily.        STOP taking these medications    acamprosate 333 mg tablet  Commonly known as: CAMPRAL     famotidine 20 MG tablet  Commonly known as: PEPCID     furosemide 40 MG tablet  Commonly known as: LASIX     NIFEdipine 30 MG (OSM) 24 hr tablet  Commonly known as: PROCARDIA-XL     ondansetron 4 MG Tbdl  Commonly known as: ZOFRAN-ODT     spironolactone 50 MG tablet  Commonly known as: ALDACTONE     sucralfate 1 gram tablet  Commonly known as: CARAFATE            Indwelling Lines/Drains at time of discharge:   Lines/Drains/Airways     None                 Time spent on the discharge of patient: 35 minutes         Cyrus Gauthier MD  Department of Hospital Medicine  [facility] ([facility]

## 2022-05-16 ENCOUNTER — OFFICE VISIT (OUTPATIENT)
Dept: HOME HEALTH SERVICES | Facility: CLINIC | Age: 47
End: 2022-05-16
Payer: MEDICARE

## 2022-05-16 DIAGNOSIS — E46 MALNUTRITION, UNSPECIFIED TYPE: ICD-10-CM

## 2022-05-16 DIAGNOSIS — I10 ESSENTIAL HYPERTENSION: Chronic | ICD-10-CM

## 2022-05-16 DIAGNOSIS — N18.30 STAGE 3 CHRONIC KIDNEY DISEASE, UNSPECIFIED WHETHER STAGE 3A OR 3B CKD: ICD-10-CM

## 2022-05-16 DIAGNOSIS — M25.552 CHRONIC LEFT HIP PAIN: ICD-10-CM

## 2022-05-16 DIAGNOSIS — I50.32 CHRONIC DIASTOLIC CHF (CONGESTIVE HEART FAILURE): ICD-10-CM

## 2022-05-16 DIAGNOSIS — G89.29 CHRONIC LEFT HIP PAIN: ICD-10-CM

## 2022-05-16 DIAGNOSIS — F10.10 ALCOHOL ABUSE: ICD-10-CM

## 2022-05-16 DIAGNOSIS — K92.0 HEMATEMESIS, PRESENCE OF NAUSEA NOT SPECIFIED: ICD-10-CM

## 2022-05-16 PROCEDURE — 99495 TCM SERVICES (MODERATE COMPLEXITY): ICD-10-PCS | Mod: S$GLB,,, | Performed by: NURSE PRACTITIONER

## 2022-05-16 PROCEDURE — 99495 TRANSJ CARE MGMT MOD F2F 14D: CPT | Mod: S$GLB,,, | Performed by: NURSE PRACTITIONER

## 2022-05-21 VITALS
OXYGEN SATURATION: 99 % | RESPIRATION RATE: 20 BRPM | HEART RATE: 77 BPM | TEMPERATURE: 98 F | DIASTOLIC BLOOD PRESSURE: 80 MMHG | SYSTOLIC BLOOD PRESSURE: 140 MMHG

## 2022-05-21 PROBLEM — E46 MALNUTRITION: Status: ACTIVE | Noted: 2022-05-21

## 2022-05-21 NOTE — PROGRESS NOTES
"[facility]  Transition of Care Home Visit    Visit Date: 2022  Encounter Provider: Kirstin Scruggs NP  PCP:  Shalonda Snyder MD    PRESENTING HISTORY      Patient ID: Irvin Diaz Jr. 46 y.o. male.    Consult Requested By:  Julia Scruggs  Reason for Consult:  Hospital Follow Up    Chief Complaint: Hospital Follow Up    The patient is being seen at home due to a physical debility that presents a taxing effort to leave the home, to mitigate high risk of hospital readmission or due to the limited availability of reliable or safe options for transportation to the point of access to the provider. The visit meets the criteria for medical necessity as defined by CMS as "health-care services needed to prevent, diagnose, or treat an illness, injury, condition, disease, or its symptoms and that meet accepted standards of medicine." Prior to treatment on this visit the chart was reviewed and patient consent was obtained.    HPI:   Per Dr. Margi Becker:   "Patient is a 46 year old male with a PMH significant for Alcohol Abuse complicated by Cirrhosis, Esophageal Varices with history of GIB, PE, HTN, HFpEF, Thrombocytopenia, JAY, CKD3 who presents with N/V and hematemesis that began early this morning.  Patient was sober from alcohol for about 2 months, but relapsed 2 weeks ago after his brother  from a stroke.  Patient is currently drinking a "bottle of gin" daily with his las drink about 2 days PTA.  Patient has had admissions for withdrawal in past.  Patient is awake and alert, and oriented.  He is still with some nausea, but emesis has resolved.  Mild epigastric pain.  Denies f/c, diarrhea.  No dysuria.  No chest pain or SOB.  Has a mild headache.  He was treated with Ativan 2mg IV x 2, NS x 2L.  CIWA at the time of my interview was 8."        Hospital Course:   Patient is a 46-year-old man with history of alcohol abuse complicated by alcoholic cirrhosis, esophageal varices, with prior " history of gastrointestinal bleeding, hypertension, chronic thrombocytopenia, pulmonary embolism, chronic diastolic heart failure, chronic kidney disease stage 3 who was admitted to the hospital with evidence of acute upper gastrointestinal bleeding along with evidence of alcohol withdrawal.  Patient treated with intravenous ceftriaxone, octreotide, and pantoprazole.  Patient also transfuse 1 dose of platelets.    Patient given doses of benzodiazepines as needed to treat alcohol withdrawal.  Gastroenterology service consulted recommended upper endoscopy which was performed and revealed esophageal varices, esophagitis, gastritis, and a nonbleeding gastric ulcer, and duodenitis.  No stigmata of bleeding noted with gastric ulcer.  Suspect bleeding secondary to esophagitis, gastritis, complicating portal hypertensive gastropathy.  Patient had resolution of bleeding with stable serial hemoglobin levels.  Patient transitioned to oral pantoprazole and further intravenous octreotide and ceftriaxone was discontinued as it was not felt that his bleeding was secondary to esophageal varices.  Patient weaned off benzodiazepines with resolution of withdrawal symptoms.  Patient counseled on the critical importance of abstaining from further alcohol abuse.  Close outpatient follow-up with primary care physician physician for ongoing management of chronic medical problems along with follow-up with gastroenterology clinic for repeat endoscopy to ensure resolution of inflamed upper gastrointestinal tract and healing of his gastric ulcer advised.       Today:  Mr. Irvin Diaz David is a 46 y.o. male is being seen and examined at home today for transitional care visit to the home environment post-discharge from inpatient hospitalization encounter described above. Irvin presents at baseline state of health.  He reports no visual signs of bleeding, fatigue, weakness, SOB, chest pain, fever, chills, cough, congestion, change in  bladder habits, change in bowel habits.  Denies alcohol use since recent hospitalization. Reports taking medication as prescribed.  He had recent follow up with hepatology, Dr. Calabrese.  Reports fair appetite, good bm pattern, good sleep pattern. Reports good bm pattern, sleep pattern.  He follows with PCP, nephrology, hepatology, pain management.  VSS. Denies any acute issues, concerns or complaints to address on today's visit. Risks of environmental exposure to coronavirus discussed including: social distancing, hand hygiene, and limiting departures from the home for necessities only.  Reports understanding and willingness to comply.     Attestation: Screening criteria to assess the level of the patient's risk for infection with COVID-19 as recommended by the CDC at the time of the above documented home visit concluded appropriateness to proceed. Universal precautions were maintained at all times, including provider use of >60% alcohol gel hand  immediately prior to entry and upon departing the patient's home as well as cleaning of equipment used in home visit with antibacterial/germicidal disposable wipes.    Admission Date: 5/1/2022  Hospital Length of Stay: 2 days  Discharge Date and Time: 5/4/2022  3:07 PM  _________________________________________________________________    Review of Systems   Constitutional: Negative for chills and fatigue.   HENT: Negative for congestion, postnasal drip and rhinorrhea.    Eyes: Negative for visual disturbance.   Respiratory: Negative for chest tightness and shortness of breath.    Gastrointestinal: Negative for abdominal pain.   Genitourinary: Negative for difficulty urinating.   Musculoskeletal: Negative for arthralgias and myalgias.   Skin: Negative for color change.   Neurological: Negative for dizziness, weakness and light-headedness.   Hematological: Does not bruise/bleed easily.   Psychiatric/Behavioral: Negative for agitation.      Assessments:  · Environmental: single story home, steps to enter, adequate lighting and temperature control  · Functional Status: Independent with ADL's/IADL's, ambulates with assistance of a cane/walker, continent of bowel and bladder  · Safety: Fall Precautions, COVID Precautions/Social Distancing/Mask Use  · Nutritional: Adequate  · Home Health: n/a  · DME/Supplies: cane    PAST HISTORY:     Past Medical History:   Diagnosis Date    Alcoholic cirrhosis of liver     Arthritis     ATN (acute tubular necrosis)     CHF (congestive heart failure)     Diverticulitis 01/2020    Esophageal varices     GERD (gastroesophageal reflux disease)     GI bleed     Hip arthritis     Left    Hypertension     Liver cirrhosis     Macrocytic anemia     Pulmonary embolism 08/2018    Unprovoked DVT.  Stop Coumadin due to GIB    Thrombocytopenia        Past Surgical History:   Procedure Laterality Date    ANKLE SURGERY      COLON SURGERY  2007    [facility]    COLONOSCOPY N/A 2/17/2021    Procedure: COLONOSCOPY;  Surgeon: Renea Billingsley MD;  Location: [facility];  Service: Endoscopy;  Laterality: N/A;    COLONOSCOPY W/ BIOPSIES  02/17/2021    ESOPHAGOGASTRODUODENOSCOPY N/A 7/26/2019    Procedure: EGD (ESOPHAGOGASTRODUODENOSCOPY);  Surgeon: Brian Trivedi MD;  Location: [facility];  Service: Endoscopy;  Laterality: N/A;    ESOPHAGOGASTRODUODENOSCOPY N/A 4/8/2020    Procedure: EGD (ESOPHAGOGASTRODUODENOSCOPY);  Surgeon: Donn Acuna MD;  Location: [facility];  Service: Endoscopy;  Laterality: N/A;    ESOPHAGOGASTRODUODENOSCOPY N/A 1/3/2021    Procedure: EGD (ESOPHAGOGASTRODUODENOSCOPY)- coffee ground emesis, hx varices;  Surgeon: Steve Chairez MD;  Location: [facility];  Service: Endoscopy;  Laterality: N/A;    ESOPHAGOGASTRODUODENOSCOPY N/A 5/3/2021    Procedure: EGD (ESOPHAGOGASTRODUODENOSCOPY);  Surgeon: Jeanmarie Ngo MD;  Location: [facility];  Service: Endoscopy;   Laterality: N/A;    ESOPHAGOGASTRODUODENOSCOPY N/A 2021    Procedure: ESOPHAGOGASTRODUODENOSCOPY (EGD);  Surgeon: Claudio Medeiros MD;  Location: [facility];  Service: Endoscopy;  Laterality: N/A;    ESOPHAGOGASTRODUODENOSCOPY  2021    ESOPHAGOGASTRODUODENOSCOPY N/A 2021    Procedure: EGD (ESOPHAGOGASTRODUODENOSCOPY);  Surgeon: jAay Jackson MD;  Location: [facility];  Service: Endoscopy;  Laterality: N/A;    ESOPHAGOGASTRODUODENOSCOPY N/A 5/3/2022    Procedure: EGD (ESOPHAGOGASTRODUODENOSCOPY);  Surgeon: Brian Trivedi MD;  Location: [facility];  Service: Endoscopy;  Laterality: N/A;    HERNIA REPAIR Left     Inguinal       Family History   Problem Relation Age of Onset    Hypertension Mother     Breast cancer Mother     Hypertension Father     Prostate cancer Father     Bladder Cancer Father        Social History     Socioeconomic History    Marital status:    Tobacco Use    Smoking status: Former Smoker     Quit date:      Years since quittin.4    Smokeless tobacco: Never Used    Tobacco comment: quit 20 years ago   Substance and Sexual Activity    Alcohol use: Not Currently     Comment: freq    Drug use: Not Currently     Types: Marijuana    Sexual activity: Yes     Comment: occ     Social Determinants of Health     Financial Resource Strain: Low Risk     Difficulty of Paying Living Expenses: Not very hard   Food Insecurity: No Food Insecurity    Worried About Running Out of Food in the Last Year: Never true    Ran Out of Food in the Last Year: Never true   Transportation Needs: No Transportation Needs    Lack of Transportation (Medical): No    Lack of Transportation (Non-Medical): No   Physical Activity: Inactive    Days of Exercise per Week: 0 days    Minutes of Exercise per Session: 0 min   Stress: No Stress Concern Present    Feeling of Stress : Not at all   Social Connections: Socially Isolated    Frequency of Communication with Friends  and Family: Three times a week    Frequency of Social Gatherings with Friends and Family: Three times a week    Attends Buddhism Services: Never    Active Member of Clubs or Organizations: No    Attends Club or Organization Meetings: Never    Marital Status:    Housing Stability: Low Risk     Unable to Pay for Housing in the Last Year: No    Number of Places Lived in the Last Year: 1    Unstable Housing in the Last Year: No       MEDICATIONS & ALLERGIES:     Current Outpatient Medications on File Prior to Visit   Medication Sig Dispense Refill    carvediloL (COREG) 6.25 MG tablet Take 1 tablet (6.25 mg total) by mouth Daily. 30 tablet 0    folic acid (FOLVITE) 1 MG tablet Take 1 tablet (1 mg total) by mouth once daily. 30 tablet 0    multivitamin Tab Take 1 tablet by mouth once daily. 30 tablet 0    pantoprazole (PROTONIX) 40 MG tablet Take 1 tablet (40 mg total) by mouth 2 (two) times daily. 60 tablet 0    potassium chloride SA (K-DUR,KLOR-CON) 20 MEQ tablet Take 20 mEq by mouth once daily.      rifAXIMin (XIFAXAN) 550 mg Tab Take 1 tablet (550 mg total) by mouth 2 (two) times daily. 60 tablet 5    thiamine 100 MG tablet Take 1 tablet (100 mg total) by mouth once daily. 30 tablet 0    traMADoL (ULTRAM) 50 mg tablet Take 50 mg by mouth every 6 (six) hours as needed.      [DISCONTINUED] acamprosate (CAMPRAL) 333 mg tablet Take 666 mg by mouth 3 (three) times daily.      [DISCONTINUED] furosemide (LASIX) 40 MG tablet Take 0.5 tablets (20 mg total) by mouth once daily. 30 tablet 1    [DISCONTINUED] NIFEdipine (PROCARDIA-XL) 30 MG (OSM) 24 hr tablet Take 1 tablet (30 mg total) by mouth once daily. 30 tablet 11    [DISCONTINUED] spironolactone (ALDACTONE) 50 MG tablet Take 1 tablet (50 mg total) by mouth once daily. (Patient not taking: No sig reported) 30 tablet 11     No current facility-administered medications on file prior to visit.        Review of patient's allergies indicates:    Allergen Reactions    Ciprofloxacin hcl Hallucinations    Meperidine Hives     Pt medicated w/multiple medications (demerol, protonix, and zofran)  w/i 10 mins time frame prior to developing localized hives near IV site that med was administered. Pt reports he has/takes all other medications on daily basis.     Morphine Itching    Nsaids (non-steroidal anti-inflammatory drug)      Kidney Disease    Tylenol [acetaminophen]      Hx of liver disease       OBJECTIVE:     Vital Signs:  Vitals:    05/16/22 1300   BP: (!) 140/80   Pulse: 77   Resp: 20   Temp: 98 °F (36.7 °C)     There is no height or weight on file to calculate BMI.       Physical Exam  Constitutional:       Appearance: Normal appearance.   HENT:      Head: Normocephalic and atraumatic.      Nose: No congestion or rhinorrhea.      Mouth/Throat:      Mouth: Mucous membranes are moist.   Cardiovascular:      Rate and Rhythm: Normal rate.      Pulses: Normal pulses.   Pulmonary:      Effort: No respiratory distress.      Breath sounds: Normal breath sounds.   Abdominal:      General: Bowel sounds are normal.      Palpations: Abdomen is soft.   Musculoskeletal:      Cervical back: Normal range of motion and neck supple.      Right lower leg: No edema.      Left lower leg: No edema.   Skin:     General: Skin is warm and dry.   Neurological:      Mental Status: He is alert. Mental status is at baseline.   Psychiatric:         Mood and Affect: Mood normal.         Laboratory  Lab Results   Component Value Date    WBC 4.73 05/04/2022    HGB 8.2 (L) 05/04/2022    HCT 24.0 (L) 05/04/2022     (H) 05/04/2022    PLT 41 (L) 05/04/2022     Lab Results   Component Value Date    INR 1.3 (H) 05/01/2022    INR 1.3 (H) 03/07/2022    INR 1.2 07/15/2021     Lab Results   Component Value Date    HGBA1C 5.7 11/12/2020     No results for input(s): POCTGLUCOSE in the last 72 hours.    TRANSITION OF CARE:     [facility] Contact Note: 5/4/22    Family and/or  Caretaker present at visit?  Yes.  Diagnostic tests reviewed/disposition: No diagnosic tests pending after this hospitalization.  Disease/illness education: Importance of Compliance with all prescribed medications and treatments, COVID Precautions/Social Distancing/Mask Use  Home health/community services discussion/referrals: Patient does not have home health established from hospital visit.  They do not need home health.  If needed, we will set up home health for the patient.   Establishment or re-establishment of referral orders for community resources: No other necessary community resources.   Discussion with other health care providers: No discussion with other health care providers necessary.     Transition of Care Visit:  I have reviewed and updated the history and problem list. I have reconciled the medication list. I have discussed the hospitalization and current medical issues, prognosis and plans with the patient/family.     Medications Reconciliation:   I have reconciled the patient's home medications and discharge medications with the patient/family. I have updated all changes. Refer to After-Visit Medication List.    Discharge plans, follow-up instructions, future appointments, provider contact information, indicators to seek emergency treatment and encouragement to call for any questions, concerns or clarification of the patient's plan of care explained to patient and/or caregiver(s), whom confirm understanding of provided information and endorse willingness to comply.     ASSESSMENT & PLAN:     HIGH RISK CONDITION(S):  Patient has a condition that poses threat to life and bodily function: s/p RENE Bryan was seen today for transitional care.    Diagnoses and all orders for this visit:          Problem List Items Addressed This Visit        Cardiac/Vascular    Essential hypertension (Chronic)    Current Assessment & Plan     Chronic  Continue meds as prescribed              Chronic diastolic  CHF (congestive heart failure)    Current Assessment & Plan     No signs of decompensation  1. Denies chest pain, SOB  2. Continue medication as prescribed  3. Keep scheduled follow up appts  4. Activity and Diet restrictions:   ? Recommend 2-3 gram sodium restriction and 1500cc- 2000cc fluid restriction.  ? Encourage physical activity with graded exercise program.  ? Requested patient to weigh themselves daily, and to notify us if their weight increases by more than 3 lbs in 1 day or 5 lbs in 3 days.                  Renal/    CKD (chronic kidney disease) stage 3, GFR 30-59 ml/min    Current Assessment & Plan     Chronic  Continue to follow with PCP and nephrology                 Endocrine    Malnutrition    Current Assessment & Plan     Encouraged small frequent meals  Nutritional supplements                 GI    Hematemesis    Current Assessment & Plan     Currently denies visual signs of bleeding  Reports no alcohol use since recent hospitalization  Continue to follow with GI and hepatology                 Orthopedic    Chronic left hip pain    Current Assessment & Plan     Continue to follow with pain management                 Other    Alcohol abuse    Current Assessment & Plan     Reports no alcohol use since recent hospitalization.  Educated on risk of alcohol use.                        Encounter for Support and Coordination of Transition of Care   Instructions:  - [facility] Nurse Practitioner to schedule home follow-up visit with patient as needed.  - Continue all medications, treatments and therapies as ordered.   - Follow all instructions, recommendations as discussed.  - Maintain Safety Precautions at all times.  - Attend all medical appointments as scheduled.  - For worsening symptoms: call Primary Care Physician or Nurse Practitioner.  - For emergencies, call 911 or immediately report to the nearest emergency room.  - Limit Risks of environmental exposure to coronavirus/COVID-19 as discussed  including: social distancing, hand hygiene, and limiting departures from the home for necessities only.         Were controlled substances prescribed?  No      Scheduled Follow-up :  Future Appointments   Date Time Provider Department Center   5/24/2022 10:30 AM LAB, [facility] LAB [facility]   5/25/2022 10:00 AM Hossein De La O MD [facility]   5/30/2022 11:30 AM Robbin De La Garza DO [facility] [place]   6/21/2022 10:30 AM LAB, [facility] LAB [facility]   6/27/2022 11:00 AM Jacek Arreaga MD [facility]   6/30/2022  2:30 PM Garth Morales MD [facility] NEPHRO [place]       After Visit Medication List :     Medication List          Accurate as of May 16, 2022 11:59 PM. If you have any questions, ask your nurse or doctor.            CONTINUE taking these medications    carvediloL 6.25 MG tablet  Commonly known as: COREG  Take 1 tablet (6.25 mg total) by mouth Daily.     folic acid 1 MG tablet  Commonly known as: FOLVITE  Take 1 tablet (1 mg total) by mouth once daily.     multivitamin Tab  Take 1 tablet by mouth once daily.     pantoprazole 40 MG tablet  Commonly known as: PROTONIX  Take 1 tablet (40 mg total) by mouth 2 (two) times daily.     potassium chloride SA 20 MEQ tablet  Commonly known as: K-DUR,KLOR-CON     rifAXIMin 550 mg Tab  Commonly known as: XIFAXAN  Take 1 tablet (550 mg total) by mouth 2 (two) times daily.     thiamine 100 MG tablet  Take 1 tablet (100 mg total) by mouth once daily.     traMADoL 50 mg tablet  Commonly known as: ULTRAM            Patient consent was obtained prior to treatment on this visit.    Attestation: Screening criteria to assess the level of the patient's risk for infection with COVID-19 as recommended by the CDC at the time of the above documented home visit concluded appropriateness to proceed. Universal precautions were maintained at all times, including provider use of >60% alcohol gel hand  immediately prior to entry and upon  departing the patient's home as well as cleaning of equipment used in home visit with antibacterial/germicidal disposable wipes.     Signature:       [facility]    Total Face-to-Face Encounter: 60 minutes with >50% of time spent discussing the care with the patient/family.

## 2022-05-21 NOTE — ASSESSMENT & PLAN NOTE
No signs of decompensation  1. Denies chest pain, SOB  2. Continue medication as prescribed  3. Keep scheduled follow up appts  4. Activity and Diet restrictions:   ? Recommend 2-3 gram sodium restriction and 1500cc- 2000cc fluid restriction.  ? Encourage physical activity with graded exercise program.  ? Requested patient to weigh themselves daily, and to notify us if their weight increases by more than 3 lbs in 1 day or 5 lbs in 3 days.

## 2022-05-21 NOTE — PATIENT INSTRUCTIONS
Instructions:  - [facility] to schedule home follow-up visit with patient as needed.  - Continue all medications, treatments and therapies as ordered.   - Follow all instructions, recommendations as discussed.  - Maintain Safety Precautions at all times.  - Attend all medical appointments as scheduled.  - For worsening symptoms: call Primary Care Physician or Nurse Practitioner.  - For emergencies, call 911 or immediately report to the nearest emergency room.  - Limit Risks of environmental exposure to coronavirus/COVID-19 as discussed including: social distancing, hand hygiene, and limiting departures from the home for necessities only.

## 2022-05-21 NOTE — ASSESSMENT & PLAN NOTE
Currently denies visual signs of bleeding  Reports no alcohol use since recent hospitalization  Continue to follow with GI and hepatology

## 2022-05-30 ENCOUNTER — TELEPHONE (OUTPATIENT)
Dept: PAIN MEDICINE | Facility: CLINIC | Age: 47
End: 2022-05-30

## 2022-05-30 ENCOUNTER — OFFICE VISIT (OUTPATIENT)
Dept: PAIN MEDICINE | Facility: CLINIC | Age: 47
End: 2022-05-30
Payer: MEDICARE

## 2022-05-30 VITALS
DIASTOLIC BLOOD PRESSURE: 89 MMHG | RESPIRATION RATE: 18 BRPM | BODY MASS INDEX: 24.92 KG/M2 | HEIGHT: 72 IN | WEIGHT: 184 LBS | HEART RATE: 82 BPM | SYSTOLIC BLOOD PRESSURE: 145 MMHG

## 2022-05-30 DIAGNOSIS — M25.552 CHRONIC LEFT HIP PAIN: Primary | ICD-10-CM

## 2022-05-30 DIAGNOSIS — G89.29 CHRONIC LEFT HIP PAIN: Primary | ICD-10-CM

## 2022-05-30 DIAGNOSIS — M87.052 AVASCULAR NECROSIS OF HIP, LEFT: Primary | ICD-10-CM

## 2022-05-30 DIAGNOSIS — D69.6 THROMBOCYTOPENIA: ICD-10-CM

## 2022-05-30 DIAGNOSIS — G89.29 CHRONIC LEFT HIP PAIN: ICD-10-CM

## 2022-05-30 DIAGNOSIS — M25.552 CHRONIC LEFT HIP PAIN: ICD-10-CM

## 2022-05-30 PROCEDURE — 99999 PR PBB SHADOW E&M-EST. PATIENT-LVL III: ICD-10-PCS | Mod: PBBFAC,,, | Performed by: STUDENT IN AN ORGANIZED HEALTH CARE EDUCATION/TRAINING PROGRAM

## 2022-05-30 PROCEDURE — 99999 PR PBB SHADOW E&M-EST. PATIENT-LVL III: CPT | Mod: PBBFAC,,, | Performed by: STUDENT IN AN ORGANIZED HEALTH CARE EDUCATION/TRAINING PROGRAM

## 2022-05-30 PROCEDURE — 99214 OFFICE O/P EST MOD 30 MIN: CPT | Mod: S$PBB,,, | Performed by: STUDENT IN AN ORGANIZED HEALTH CARE EDUCATION/TRAINING PROGRAM

## 2022-05-30 PROCEDURE — 99214 PR OFFICE/OUTPT VISIT, EST, LEVL IV, 30-39 MIN: ICD-10-PCS | Mod: S$PBB,,, | Performed by: STUDENT IN AN ORGANIZED HEALTH CARE EDUCATION/TRAINING PROGRAM

## 2022-05-30 PROCEDURE — 99213 OFFICE O/P EST LOW 20 MIN: CPT | Mod: PBBFAC | Performed by: STUDENT IN AN ORGANIZED HEALTH CARE EDUCATION/TRAINING PROGRAM

## 2022-05-30 NOTE — TELEPHONE ENCOUNTER
----- Message from Khadra Trimble sent at 5/30/2022  4:00 PM CDT -----  Regarding: Refill  Contact: [phone]  Type:  RX Refill Request    Who Called: Patient  Refill or New Rx:Refill  RX Name and Strength:traMADoL (ULTRAM) 50 mg tablet  How is the patient currently taking it? (ex. 1XDay):  Is this a 30 day or 90 day RX:  Preferred Pharmacy with phone number:[id number] [address] [facility] [address]  Local or Mail Order:Local  Ordering Provider:Robbin Robertson  Would the patient rather a call back or a response via [facility]? Call Back  Best Call Back Number:[phone]  Additional Information: completely out of medication

## 2022-05-30 NOTE — H&P (VIEW-ONLY)
Chronic Pain - New Consult    Referring Physician: No ref. provider found    Date: 05/30/2022     Re: Irvin Diaz Jr.  MR#: [id number]  YOB: 1975  Age: 46 y.o.    Chief Complaint:   Chief Complaint   Patient presents with    Hip Pain     left    Leg Pain     left    Groin Pain     **This note is dictated using the M*Modal Fluency Direct word recognition program. There are word recognition mistakes that are occasionally missed on review.**    ASSESSMENT: 46 y.o. year old male with left hip pain, consistent with     1. Avascular necrosis of hip, left  CBC Without Differential    CBC Without Differential   2. Chronic left hip pain     3. Thrombocytopenia  CBC Without Differential    CBC Without Differential         PLAN:     Avascular necrosis of the left hip   -We will reschedule the patient for left hip block.  Risks,benefits, and alternatives of the procedure were discussed with the patient and He would like to proceed with the procedure.  At this juncture, we believe that the patient's medical comorbidity status adds high risk and complexity to our proposed evaluation and treatment.  -will plan on RFA on 4/29 if he gets good relief from the hip block.  Platelet cutoff is 50.     -repeat CBC 2 days before block and another repeat CBC 2 days before RFA to ensure PLT > 50. \   -Will get PT/INR before hip RFA  -previously had Tramadol #60 BID PRN. No refill today.  -refer to ortho for new evaluation. Likely a difficult case given comobidities.    Thorombocytopenia  -platelets are 41 on 5/4/22, 92 on 5/26/22  -Avanos recommended above 50.    Cirrhosis 2/2 past EtOH  -following with hepatology  -may need liver transplant    Kidney disease  -GFR 54    - RTC 2.5 months  - Counseled patient regarding the importance of  activity modification.    The above plan and management options were discussed at length with patient. Patient is in agreement with the above and verbalized understanding. It will be  communicated with the referring physician via electronic record, fax, or mail.  Lab/study reports reviewed were important and necessary because subsequent medical and treatment recommendations required review of the above lab/study reports. Images viewed/reviewed above were important and necessary because subsequent medical and treatment recommendations required review of the reviewed image(s).     Electronically signed by:  Robbin De La Garza, DO  05/30/2022    =========================================================================================================    SUBJECTIVE:      Interval History 5/30/2022:     Irvin Diaz Jr. is a 46 y.o. male presents to the clinic for follow up.  Since last visit the pain has is unchanged. He missed his original procedure date due to not checking his voicemail and transportation issues. He is still interested in the procedure.  WE had a long talk about bleeding risk with his low platelets and high INR.    The pain is located in the left hip area and radiates to the left leg/groin.  The pain is described as aching, shooting and stabbing    At BEST  8/10   At WORST  10/10 on the WORST day.    On average pain is rated as 8/10.   Today the pain is rated as 8/10  Symptoms interfere with daily activity, sleeping and work.   Exacerbating factors: walking.    Mitigating factors nothing.     Current pain medications: none  Failed Pain Medications: cannot take NSAIDs due to gastric bleeding, cannot take tylenol due to liver failure.     Initial Hx:  Irvin Diaz Jr. is a 46 y.o. male presents to the clinic for the evaluation of left hip pain. The pain started 3 years ago following fall and symptoms have been worsening. The patient states used to get hip injections of the left hip.  He is unable to walk without a cane.  He states that he was told after imaging that he had a fracture in 2019.  He had an injection (and aspiration) in December/january and it helps the  pain for about 3 weeks.  After the injections he still needs the pain but it helps.  The ortho physician at [facility]    CT note about the hip:  There is collapse of the left femoral head superior portion with bone-on-bone as well as underlying femoral sclerotic changes suggesting AVN with severe secondary degenerative changes of the acetabulum and the left hip effusion stable since prior exam.     The patient says that he has seen a ortho surgeon in the past and that they are unable to do surgery due to his liver failure.    Pain Description:    The pain is located in the left hip area and radiates to the left leg/ groin area.    At BEST  8/10   At WORST  10/10 on the WORST day.    On average pain is rated as 8/10.   Today the pain is rated as 8/10  The pain is continuous.  The pain is described as aching, shooting and throbbing    Symptoms interfere with daily activity, sleeping and work.   Exacerbating factors: Standing, Laying, Bending, Walking, Night Time, Morning, Flexing, Lifting and Getting out of bed/chair.    Mitigating factors laying down and medications.   He reports 5 hours of sleep per night.    Physical Therapy/Home Exercise: No, not currently in physical therapy or home exercise program    Current Pain Medications:    - none    Failed Pain Medications:    - cannot take NSAIDs due to gastric bleeding, cannot take tylenol due to liver failure.     Pain Treatment Therapies:    Pain procedures: hip injections  Physical Therapy: physical therapy made things worse  Chiropractor: none  Acupuncture: none  TENS unit: none  Spinal decompression: none  Joint replacement: none    Patient denies urinary incontinence, bowel incontinence and loss of sensations. (+) weakness in the left leg  Patient denies any suicidal or homicidal ideations     report:  Reviewed and consistent with medication use as prescribed.    Imaging:   XR abdomen 03/2022:  Please note the hemidiaphragms are not included in  their entirety.  Multiple surgical clips and presumed anastomotic suture line project over the right abdomen.  There are a few mildly prominent loops of gas-filled small bowel loops present measuring up to 3.3 cm in the left abdomen.  Limited evaluation of free intraperitoneal air to patient positioning/technique.  Calcifications project over the pelvis, likely phleboliths.  Osseous structures demonstrate significant degenerative change of the left hip with chronic appearing deformity/collapse of the left femoral head.    CT abdomen 03/2022:  Mild circumferential wall thickening involving the proximal colon extending from the ileocolic anastomosis at the hepatic flexure through around the level of the splenic flexure suggests inflammatory or infectious colitis.  No convincing transmural inflammation is seen.     Postoperative changes of proximal colectomy and scattered mild diverticulosis of the more distal colon.  No convincing diverticulitis, bowel obstruction, free air or abscess.     Findings of cirrhosis and mild abdominal ascites as described essentially stable.     Left hip findings suggesting AVN as described.     This report was flagged in Epic as abnormal.     No other significant or acute findings.       Past Medical History:   Diagnosis Date    Alcoholic cirrhosis of liver     Arthritis     ATN (acute tubular necrosis)     CHF (congestive heart failure)     Diverticulitis 01/2020    Esophageal varices     GERD (gastroesophageal reflux disease)     GI bleed     Hip arthritis     Left    Hypertension     Liver cirrhosis     Macrocytic anemia     Pulmonary embolism 08/2018    Unprovoked DVT.  Stop Coumadin due to GIB    Thrombocytopenia      Past Surgical History:   Procedure Laterality Date    ANKLE SURGERY      COLON SURGERY  2007    [facility]    COLONOSCOPY N/A 2/17/2021    Procedure: COLONOSCOPY;  Surgeon: Renea Billingsley MD;  Location: [facility];  Service:  Endoscopy;  Laterality: N/A;    COLONOSCOPY W/ BIOPSIES  2021    ESOPHAGOGASTRODUODENOSCOPY N/A 2019    Procedure: EGD (ESOPHAGOGASTRODUODENOSCOPY);  Surgeon: Brian Trivedi MD;  Location: [facility];  Service: Endoscopy;  Laterality: N/A;    ESOPHAGOGASTRODUODENOSCOPY N/A 2020    Procedure: EGD (ESOPHAGOGASTRODUODENOSCOPY);  Surgeon: Donn Acuna MD;  Location: [facility];  Service: Endoscopy;  Laterality: N/A;    ESOPHAGOGASTRODUODENOSCOPY N/A 1/3/2021    Procedure: EGD (ESOPHAGOGASTRODUODENOSCOPY)- coffee ground emesis, hx varices;  Surgeon: Steve Chairez MD;  Location: [facility];  Service: Endoscopy;  Laterality: N/A;    ESOPHAGOGASTRODUODENOSCOPY N/A 5/3/2021    Procedure: EGD (ESOPHAGOGASTRODUODENOSCOPY);  Surgeon: Jeanmarie Ngo MD;  Location: [facility];  Service: Endoscopy;  Laterality: N/A;    ESOPHAGOGASTRODUODENOSCOPY N/A 2021    Procedure: ESOPHAGOGASTRODUODENOSCOPY (EGD);  Surgeon: Claudio Medeiros MD;  Location: [facility];  Service: Endoscopy;  Laterality: N/A;    ESOPHAGOGASTRODUODENOSCOPY  2021    ESOPHAGOGASTRODUODENOSCOPY N/A 2021    Procedure: EGD (ESOPHAGOGASTRODUODENOSCOPY);  Surgeon: Ajay Jackson MD;  Location: [facility];  Service: Endoscopy;  Laterality: N/A;    ESOPHAGOGASTRODUODENOSCOPY N/A 5/3/2022    Procedure: EGD (ESOPHAGOGASTRODUODENOSCOPY);  Surgeon: Brian Trivedi MD;  Location: [facility];  Service: Endoscopy;  Laterality: N/A;    HERNIA REPAIR Left     Inguinal     Social History     Socioeconomic History    Marital status:    Tobacco Use    Smoking status: Former Smoker     Quit date: 2000     Years since quittin.4    Smokeless tobacco: Never Used    Tobacco comment: quit 20 years ago   Substance and Sexual Activity    Alcohol use: Not Currently     Comment: freq    Drug use: Not Currently     Types: Marijuana    Sexual activity: Yes     Comment: occ     Social Determinants of Health      Financial Resource Strain: Low Risk     Difficulty of Paying Living Expenses: Not very hard   Food Insecurity: No Food Insecurity    Worried About Running Out of Food in the Last Year: Never true    Ran Out of Food in the Last Year: Never true   Transportation Needs: No Transportation Needs    Lack of Transportation (Medical): No    Lack of Transportation (Non-Medical): No   Physical Activity: Inactive    Days of Exercise per Week: 0 days    Minutes of Exercise per Session: 0 min   Stress: No Stress Concern Present    Feeling of Stress : Not at all   Social Connections: Socially Isolated    Frequency of Communication with Friends and Family: Three times a week    Frequency of Social Gatherings with Friends and Family: Three times a week    Attends Islam Services: Never    Active Member of Clubs or Organizations: No    Attends Club or Organization Meetings: Never    Marital Status:    Housing Stability: Low Risk     Unable to Pay for Housing in the Last Year: No    Number of Places Lived in the Last Year: 1    Unstable Housing in the Last Year: No     Family History   Problem Relation Age of Onset    Hypertension Mother     Breast cancer Mother     Hypertension Father     Prostate cancer Father     Bladder Cancer Father        Review of patient's allergies indicates:   Allergen Reactions    Ciprofloxacin hcl Hallucinations    Meperidine Hives     Pt medicated w/multiple medications (demerol, protonix, and zofran)  w/i 10 mins time frame prior to developing localized hives near IV site that med was administered. Pt reports he has/takes all other medications on daily basis.     Morphine Itching    Nsaids (non-steroidal anti-inflammatory drug)      Kidney Disease    Tylenol [acetaminophen]      Hx of liver disease       Current Outpatient Medications   Medication Sig    carvediloL (COREG) 6.25 MG tablet Take 1 tablet (6.25 mg total) by mouth Daily.    folic acid (FOLVITE) 1  MG tablet Take 1 tablet (1 mg total) by mouth once daily.    multivitamin Tab Take 1 tablet by mouth once daily.    pantoprazole (PROTONIX) 40 MG tablet Take 1 tablet (40 mg total) by mouth 2 (two) times daily.    potassium chloride SA (K-DUR,KLOR-CON) 20 MEQ tablet Take 20 mEq by mouth once daily.    rifAXIMin (XIFAXAN) 550 mg Tab Take 1 tablet (550 mg total) by mouth 2 (two) times daily.    thiamine 100 MG tablet Take 1 tablet (100 mg total) by mouth once daily.    traMADoL (ULTRAM) 50 mg tablet Take 50 mg by mouth every 6 (six) hours as needed.     No current facility-administered medications for this visit.       REVIEW OF SYSTEMS:    GENERAL:  No weight loss, malaise or fevers.   HEENT:   No recent changes in vision or hearing   NECK:  Negative for lumps, no difficulty with swallowing.  RESPIRATORY:  Negative for cough, wheezing or shortness of breath, patient denies any recent URI.  CARDIOVASCULAR:  Negative for chest pain, leg swelling or palpitations.  GI:  Negative for abdominal discomfort, blood in stools or black stools or change in bowel habits.  MUSCULOSKELETAL:  See HPI.  SKIN:  Negative for lesions, rash, and itching. + skin itching  PSYCH:  No mood disorder or recent psychosocial stressors.  Patients sleep is not disturbed secondary to pain.  HEMATOLOGY/LYMPHOLOGY:  Negative for prolonged bleeding, bruising easily or swollen nodes.  Patient is not currently taking any anti-coagulants  NEURO:   No history of headaches, syncope, paralysis, seizures or tremors.  All other reviewed and negative other than HPI.    OBJECTIVE:    BP (!) 145/89   Pulse 82   Resp 18   Ht 6' (1.829 m)   Wt 83.5 kg (184 lb)   BMI 24.95 kg/m²     PHYSICAL EXAMINATION:    GENERAL: Fraile, in no acute distress, alert and oriented x3.  PSYCH:  Mood and affect appropriate.  SKIN: Skin color, texture, turgor normal, no rashes or lesions.  HEAD/FACE:  Normocephalic, atraumatic. Cranial nerves grossly intact.  CV: RRR with  palpation of the radial artery.  PULM: CTAB. No evidence of respiratory difficulty, symmetric chest rise.  GI:  Soft    MUSKULOSKELETAL:    EXTREMITIES:   Left Hip Exam (limited ROM and exam 2/2/ pain)  - Log Roll Positive  - FADIR Positive  - Stinchfield Unable to Perform  - Hip Scour Unable to Perform  - GTB Tenderness Positive   -TTP over anterior and posterior hip joint  - TTP of the superior knee joint.    MUSCULOSKELETAL:  Atrophy of the left leg compared to the right  No deformities, edema, or skin discoloration are noted on visible skin. Good capillary refill.     NEURO: Bilateral upper and lower extremity coordination and muscle stretch reflexes are physiologic and symmetric.      NEUROLOGICAL EXAM:  MENTAL STATUS: A x O x 3, good concentration, speech is fluent and goal directed  MEMORY: recent and remote are intact  CN: CN2-12 grossly intact  MOTOR: 5/5 in all muscle groups on the right. HF 3/5 on the left, 4/5 KE, 4/5 KF with pain  DTRs: 3+ intact symmetric patella and 2 + achilles  Sensation:    -yes Loss of sensation in a left lower L-1 and L-2 on the left distribution.  Babinski: absent     Gait: Cane, abnormal. Short stride. Favors left leg

## 2022-05-30 NOTE — PROGRESS NOTES
Chronic Pain - New Consult    Referring Physician: No ref. provider found    Date: 05/30/2022     Re: Irvin Diaz Jr.  MR#: [id number]  YOB: 1975  Age: 46 y.o.    Chief Complaint:   Chief Complaint   Patient presents with    Hip Pain     left    Leg Pain     left    Groin Pain     **This note is dictated using the M*Modal Fluency Direct word recognition program. There are word recognition mistakes that are occasionally missed on review.**    ASSESSMENT: 46 y.o. year old male with left hip pain, consistent with     1. Avascular necrosis of hip, left  CBC Without Differential    CBC Without Differential   2. Chronic left hip pain     3. Thrombocytopenia  CBC Without Differential    CBC Without Differential         PLAN:     Avascular necrosis of the left hip   -We will reschedule the patient for left hip block.  Risks,benefits, and alternatives of the procedure were discussed with the patient and He would like to proceed with the procedure.  At this juncture, we believe that the patient's medical comorbidity status adds high risk and complexity to our proposed evaluation and treatment.  -will plan on RFA on 4/29 if he gets good relief from the hip block.  Platelet cutoff is 50.     -repeat CBC 2 days before block and another repeat CBC 2 days before RFA to ensure PLT > 50. \   -Will get PT/INR before hip RFA  -previously had Tramadol #60 BID PRN. No refill today.  -refer to ortho for new evaluation. Likely a difficult case given comobidities.    Thorombocytopenia  -platelets are 41 on 5/4/22, 92 on 5/26/22  -Avanos recommended above 50.    Cirrhosis 2/2 past EtOH  -following with hepatology  -may need liver transplant    Kidney disease  -GFR 54    - RTC 2.5 months  - Counseled patient regarding the importance of  activity modification.    The above plan and management options were discussed at length with patient. Patient is in agreement with the above and verbalized understanding. It will be  communicated with the referring physician via electronic record, fax, or mail.  Lab/study reports reviewed were important and necessary because subsequent medical and treatment recommendations required review of the above lab/study reports. Images viewed/reviewed above were important and necessary because subsequent medical and treatment recommendations required review of the reviewed image(s).     Electronically signed by:  Robbin De La Garza, DO  05/30/2022    =========================================================================================================    SUBJECTIVE:      Interval History 5/30/2022:     Irvin Diaz Jr. is a 46 y.o. male presents to the clinic for follow up.  Since last visit the pain has is unchanged. He missed his original procedure date due to not checking his voicemail and transportation issues. He is still interested in the procedure.  WE had a long talk about bleeding risk with his low platelets and high INR.    The pain is located in the left hip area and radiates to the left leg/groin.  The pain is described as aching, shooting and stabbing    At BEST  8/10   At WORST  10/10 on the WORST day.    On average pain is rated as 8/10.   Today the pain is rated as 8/10  Symptoms interfere with daily activity, sleeping and work.   Exacerbating factors: walking.    Mitigating factors nothing.     Current pain medications: none  Failed Pain Medications: cannot take NSAIDs due to gastric bleeding, cannot take tylenol due to liver failure.     Initial Hx:  Irvin Diaz Jr. is a 46 y.o. male presents to the clinic for the evaluation of left hip pain. The pain started 3 years ago following fall and symptoms have been worsening. The patient states used to get hip injections of the left hip.  He is unable to walk without a cane.  He states that he was told after imaging that he had a fracture in 2019.  He had an injection (and aspiration) in December/january and it helps the  pain for about 3 weeks.  After the injections he still needs the pain but it helps.  The ortho physician at [facility]    CT note about the hip:  There is collapse of the left femoral head superior portion with bone-on-bone as well as underlying femoral sclerotic changes suggesting AVN with severe secondary degenerative changes of the acetabulum and the left hip effusion stable since prior exam.     The patient says that he has seen a ortho surgeon in the past and that they are unable to do surgery due to his liver failure.    Pain Description:    The pain is located in the left hip area and radiates to the left leg/ groin area.    At BEST  8/10   At WORST  10/10 on the WORST day.    On average pain is rated as 8/10.   Today the pain is rated as 8/10  The pain is continuous.  The pain is described as aching, shooting and throbbing    Symptoms interfere with daily activity, sleeping and work.   Exacerbating factors: Standing, Laying, Bending, Walking, Night Time, Morning, Flexing, Lifting and Getting out of bed/chair.    Mitigating factors laying down and medications.   He reports 5 hours of sleep per night.    Physical Therapy/Home Exercise: No, not currently in physical therapy or home exercise program    Current Pain Medications:    - none    Failed Pain Medications:    - cannot take NSAIDs due to gastric bleeding, cannot take tylenol due to liver failure.     Pain Treatment Therapies:    Pain procedures: hip injections  Physical Therapy: physical therapy made things worse  Chiropractor: none  Acupuncture: none  TENS unit: none  Spinal decompression: none  Joint replacement: none    Patient denies urinary incontinence, bowel incontinence and loss of sensations. (+) weakness in the left leg  Patient denies any suicidal or homicidal ideations     report:  Reviewed and consistent with medication use as prescribed.    Imaging:   XR abdomen 03/2022:  Please note the hemidiaphragms are not included in  their entirety.  Multiple surgical clips and presumed anastomotic suture line project over the right abdomen.  There are a few mildly prominent loops of gas-filled small bowel loops present measuring up to 3.3 cm in the left abdomen.  Limited evaluation of free intraperitoneal air to patient positioning/technique.  Calcifications project over the pelvis, likely phleboliths.  Osseous structures demonstrate significant degenerative change of the left hip with chronic appearing deformity/collapse of the left femoral head.    CT abdomen 03/2022:  Mild circumferential wall thickening involving the proximal colon extending from the ileocolic anastomosis at the hepatic flexure through around the level of the splenic flexure suggests inflammatory or infectious colitis.  No convincing transmural inflammation is seen.     Postoperative changes of proximal colectomy and scattered mild diverticulosis of the more distal colon.  No convincing diverticulitis, bowel obstruction, free air or abscess.     Findings of cirrhosis and mild abdominal ascites as described essentially stable.     Left hip findings suggesting AVN as described.     This report was flagged in Epic as abnormal.     No other significant or acute findings.       Past Medical History:   Diagnosis Date    Alcoholic cirrhosis of liver     Arthritis     ATN (acute tubular necrosis)     CHF (congestive heart failure)     Diverticulitis 01/2020    Esophageal varices     GERD (gastroesophageal reflux disease)     GI bleed     Hip arthritis     Left    Hypertension     Liver cirrhosis     Macrocytic anemia     Pulmonary embolism 08/2018    Unprovoked DVT.  Stop Coumadin due to GIB    Thrombocytopenia      Past Surgical History:   Procedure Laterality Date    ANKLE SURGERY      COLON SURGERY  2007    [facility]    COLONOSCOPY N/A 2/17/2021    Procedure: COLONOSCOPY;  Surgeon: Renea Billingsley MD;  Location[facility];  Service:  Endoscopy;  Laterality: N/A;    COLONOSCOPY W/ BIOPSIES  2021    ESOPHAGOGASTRODUODENOSCOPY N/A 2019    Procedure: EGD (ESOPHAGOGASTRODUODENOSCOPY);  Surgeon: Brian Trivedi MD;  Location: [facility];  Service: Endoscopy;  Laterality: N/A;    ESOPHAGOGASTRODUODENOSCOPY N/A 2020    Procedure: EGD (ESOPHAGOGASTRODUODENOSCOPY);  Surgeon: Donn Acuna MD;  Location: [facility];  Service: Endoscopy;  Laterality: N/A;    ESOPHAGOGASTRODUODENOSCOPY N/A 1/3/2021    Procedure: EGD (ESOPHAGOGASTRODUODENOSCOPY)- coffee ground emesis, hx varices;  Surgeon: Steve Chairez MD;  Location: [facility];  Service: Endoscopy;  Laterality: N/A;    ESOPHAGOGASTRODUODENOSCOPY N/A 5/3/2021    Procedure: EGD (ESOPHAGOGASTRODUODENOSCOPY);  Surgeon: Jeanmarie Ngo MD;  [facility];  Service: Endoscopy;  Laterality: N/A;    ESOPHAGOGASTRODUODENOSCOPY N/A 2021    Procedure: ESOPHAGOGASTRODUODENOSCOPY (EGD);  Surgeon: Claudio Medeiros MD;  Location: [facility];  Service: Endoscopy;  Laterality: N/A;    ESOPHAGOGASTRODUODENOSCOPY  2021    ESOPHAGOGASTRODUODENOSCOPY N/A 2021    Procedure: EGD (ESOPHAGOGASTRODUODENOSCOPY);  Surgeon: Ajay Jackson MD;  Location: [facility];  Service: Endoscopy;  Laterality: N/A;    ESOPHAGOGASTRODUODENOSCOPY N/A 5/3/2022    Procedure: EGD (ESOPHAGOGASTRODUODENOSCOPY);  Surgeon: Brian Trivedi MD;  Location: [facility];  Service: Endoscopy;  Laterality: N/A;    HERNIA REPAIR Left     Inguinal     Social History     Socioeconomic History    Marital status:    Tobacco Use    Smoking status: Former Smoker     Quit date: 2000     Years since quittin.4    Smokeless tobacco: Never Used    Tobacco comment: quit 20 years ago   Substance and Sexual Activity    Alcohol use: Not Currently     Comment: freq    Drug use: Not Currently     Types: Marijuana    Sexual activity: Yes     Comment: occ     Social Determinants of Health      Financial Resource Strain: Low Risk     Difficulty of Paying Living Expenses: Not very hard   Food Insecurity: No Food Insecurity    Worried About Running Out of Food in the Last Year: Never true    Ran Out of Food in the Last Year: Never true   Transportation Needs: No Transportation Needs    Lack of Transportation (Medical): No    Lack of Transportation (Non-Medical): No   Physical Activity: Inactive    Days of Exercise per Week: 0 days    Minutes of Exercise per Session: 0 min   Stress: No Stress Concern Present    Feeling of Stress : Not at all   Social Connections: Socially Isolated    Frequency of Communication with Friends and Family: Three times a week    Frequency of Social Gatherings with Friends and Family: Three times a week    Attends [employer] Services: Never    Active Member of Clubs or Organizations: No    Attends Club or Organization Meetings: Never    Marital Status:    Housing Stability: Low Risk     Unable to Pay for Housing in the Last Year: No    Number of Places Lived in the Last Year: 1    Unstable Housing in the Last Year: No     Family History   Problem Relation Age of Onset    Hypertension Mother     Breast cancer Mother     Hypertension Father     Prostate cancer Father     Bladder Cancer Father        Review of patient's allergies indicates:   Allergen Reactions    Ciprofloxacin hcl Hallucinations    Meperidine Hives     Pt medicated w/multiple medications (demerol, protonix, and zofran)  w/i 10 mins time frame prior to developing localized hives near IV site that med was administered. Pt reports he has/takes all other medications on daily basis.     Morphine Itching    Nsaids (non-steroidal anti-inflammatory drug)      Kidney Disease    Tylenol [acetaminophen]      Hx of liver disease       Current Outpatient Medications   Medication Sig    carvediloL (COREG) 6.25 MG tablet Take 1 tablet (6.25 mg total) by mouth Daily.    folic acid (FOLVITE) 1  MG tablet Take 1 tablet (1 mg total) by mouth once daily.    multivitamin Tab Take 1 tablet by mouth once daily.    pantoprazole (PROTONIX) 40 MG tablet Take 1 tablet (40 mg total) by mouth 2 (two) times daily.    potassium chloride SA (K-DUR,KLOR-CON) 20 MEQ tablet Take 20 mEq by mouth once daily.    rifAXIMin (XIFAXAN) 550 mg Tab Take 1 tablet (550 mg total) by mouth 2 (two) times daily.    thiamine 100 MG tablet Take 1 tablet (100 mg total) by mouth once daily.    traMADoL (ULTRAM) 50 mg tablet Take 50 mg by mouth every 6 (six) hours as needed.     No current facility-administered medications for this visit.       REVIEW OF SYSTEMS:    GENERAL:  No weight loss, malaise or fevers.   HEENT:   No recent changes in vision or hearing   NECK:  Negative for lumps, no difficulty with swallowing.  RESPIRATORY:  Negative for cough, wheezing or shortness of breath, patient denies any recent URI.  CARDIOVASCULAR:  Negative for chest pain, leg swelling or palpitations.  GI:  Negative for abdominal discomfort, blood in stools or black stools or change in bowel habits.  MUSCULOSKELETAL:  See HPI.  SKIN:  Negative for lesions, rash, and itching. + skin itching  PSYCH:  No mood disorder or recent psychosocial stressors.  Patients sleep is not disturbed secondary to pain.  HEMATOLOGY/LYMPHOLOGY:  Negative for prolonged bleeding, bruising easily or swollen nodes.  Patient is not currently taking any anti-coagulants  NEURO:   No history of headaches, syncope, paralysis, seizures or tremors.  All other reviewed and negative other than HPI.    OBJECTIVE:    BP (!) 145/89   Pulse 82   Resp 18   Ht 6' (1.829 m)   Wt 83.5 kg (184 lb)   BMI 24.95 kg/m²     PHYSICAL EXAMINATION:    GENERAL: Fraile, in no acute distress, alert and oriented x3.  PSYCH:  Mood and affect appropriate.  SKIN: Skin color, texture, turgor normal, no rashes or lesions.  HEAD/FACE:  Normocephalic, atraumatic. Cranial nerves grossly intact.  CV: RRR with  palpation of the radial artery.  PULM: CTAB. No evidence of respiratory difficulty, symmetric chest rise.  GI:  Soft    MUSKULOSKELETAL:    EXTREMITIES:   Left Hip Exam (limited ROM and exam 2/2/ pain)  - Log Roll Positive  - FADIR Positive  - Stinchfield Unable to Perform  - Hip Scour Unable to Perform  - GTB Tenderness Positive   -TTP over anterior and posterior hip joint  - TTP of the superior knee joint.    MUSCULOSKELETAL:  Atrophy of the left leg compared to the right  No deformities, edema, or skin discoloration are noted on visible skin. Good capillary refill.     NEURO: Bilateral upper and lower extremity coordination and muscle stretch reflexes are physiologic and symmetric.      NEUROLOGICAL EXAM:  MENTAL STATUS: A x O x 3, good concentration, speech is fluent and goal directed  MEMORY: recent and remote are intact  CN: CN2-12 grossly intact  MOTOR: 5/5 in all muscle groups on the right. HF 3/5 on the left, 4/5 KE, 4/5 KF with pain  DTRs: 3+ intact symmetric patella and 2 + achilles  Sensation:    -yes Loss of sensation in a left lower L-1 and L-2 on the left distribution.  Babinski: absent     Gait: Cane, abnormal. Short stride. Favors left leg

## 2022-05-30 NOTE — TELEPHONE ENCOUNTER
Patient requesting refill on: Tramadol 50mg  Last office visit : 05/30/2022   shows last refill on: 03/30/2022  Contract: No File  Patient last UDS: No file

## 2022-05-31 ENCOUNTER — TELEPHONE (OUTPATIENT)
Dept: PAIN MEDICINE | Facility: CLINIC | Age: 47
End: 2022-05-31
Payer: MEDICARE

## 2022-05-31 DIAGNOSIS — M87.052 AVASCULAR NECROSIS OF HIP, LEFT: ICD-10-CM

## 2022-05-31 DIAGNOSIS — M25.552 CHRONIC LEFT HIP PAIN: Primary | ICD-10-CM

## 2022-05-31 DIAGNOSIS — G89.29 CHRONIC LEFT HIP PAIN: Primary | ICD-10-CM

## 2022-05-31 RX ORDER — TRAMADOL HYDROCHLORIDE 50 MG/1
50 TABLET ORAL EVERY 12 HOURS PRN
Qty: 60 TABLET | Refills: 0 | Status: SHIPPED | OUTPATIENT
Start: 2022-05-31 | End: 2022-05-31 | Stop reason: SDUPTHER

## 2022-05-31 RX ORDER — TRAMADOL HYDROCHLORIDE 50 MG/1
50 TABLET ORAL EVERY 12 HOURS PRN
Qty: 60 TABLET | Refills: 0 | Status: SHIPPED | OUTPATIENT
Start: 2022-05-31 | End: 2022-06-26 | Stop reason: ALTCHOICE

## 2022-05-31 NOTE — TELEPHONE ENCOUNTER
----- Message from Trish Lynn sent at 5/31/2022 12:00 PM CDT -----  Regarding: Refill / Pharmacy Change  Can the clinic reply in [facility]: NO           Please refill the medication(s) listed below. Please call the patient when the prescription(s) is ready for  at this phone number   [phone]           Medication #1 traMADoL (ULTRAM) 50 mg tablet         Medication #2            Preferred Pharmacy: [facility] [address] [place] [address] [address] AT [facility] [address]     Patient discharged home  with family at side ambulatory to personal car. No distress noted. Personal belongings with patient. Patient voiced understanding to instructions given.        Paige De Jesus RN  01/02/17 [id number]

## 2022-05-31 NOTE — TELEPHONE ENCOUNTER
Staff returned patients call, patient stated that the medication was called into the wrong pharmacy and would like it to be sent to [facility]. Staff verbalized understanding and asked provider to cancel prescription he originally sent and resend it to correct pharmacy.

## 2022-06-03 RX ORDER — POTASSIUM CHLORIDE 20 MEQ/1
20 TABLET, EXTENDED RELEASE ORAL DAILY
Qty: 30 TABLET | Refills: 0 | Status: SHIPPED | OUTPATIENT
Start: 2022-06-03 | End: 2022-06-23 | Stop reason: SDUPTHER

## 2022-06-06 ENCOUNTER — OFFICE VISIT (OUTPATIENT)
Dept: PRIMARY CARE CLINIC | Facility: CLINIC | Age: 47
End: 2022-06-06
Payer: MEDICARE

## 2022-06-06 VITALS
HEIGHT: 72 IN | HEART RATE: 82 BPM | TEMPERATURE: 98 F | WEIGHT: 186.81 LBS | RESPIRATION RATE: 18 BRPM | OXYGEN SATURATION: 97 % | BODY MASS INDEX: 25.3 KG/M2 | SYSTOLIC BLOOD PRESSURE: 136 MMHG | DIASTOLIC BLOOD PRESSURE: 74 MMHG

## 2022-06-06 DIAGNOSIS — Z23 NEED FOR VACCINATION: ICD-10-CM

## 2022-06-06 DIAGNOSIS — Z76.89 ENCOUNTER TO ESTABLISH CARE: Primary | ICD-10-CM

## 2022-06-06 PROCEDURE — 99214 OFFICE O/P EST MOD 30 MIN: CPT | Mod: S$PBB,,, | Performed by: STUDENT IN AN ORGANIZED HEALTH CARE EDUCATION/TRAINING PROGRAM

## 2022-06-06 PROCEDURE — 90677 PCV20 VACCINE IM: CPT | Mod: PBBFAC,PN

## 2022-06-06 PROCEDURE — 99214 PR OFFICE/OUTPT VISIT, EST, LEVL IV, 30-39 MIN: ICD-10-PCS | Mod: S$PBB,,, | Performed by: STUDENT IN AN ORGANIZED HEALTH CARE EDUCATION/TRAINING PROGRAM

## 2022-06-06 PROCEDURE — 99999 PR PBB SHADOW E&M-EST. PATIENT-LVL V: CPT | Mod: PBBFAC,,, | Performed by: STUDENT IN AN ORGANIZED HEALTH CARE EDUCATION/TRAINING PROGRAM

## 2022-06-06 PROCEDURE — 90714 TD VACC NO PRESV 7 YRS+ IM: CPT | Mod: PBBFAC,PN

## 2022-06-06 PROCEDURE — 99999 PR PBB SHADOW E&M-EST. PATIENT-LVL V: ICD-10-PCS | Mod: PBBFAC,,, | Performed by: STUDENT IN AN ORGANIZED HEALTH CARE EDUCATION/TRAINING PROGRAM

## 2022-06-06 PROCEDURE — 99215 OFFICE O/P EST HI 40 MIN: CPT | Mod: PBBFAC,PN,25 | Performed by: STUDENT IN AN ORGANIZED HEALTH CARE EDUCATION/TRAINING PROGRAM

## 2022-06-06 NOTE — PROGRESS NOTES
Subjective:       Patient ID: Irvin Diaz Jr. is a 46 y.o. male.    Chief Complaint: Establish Care      HPI:  46 y.o. male presents to [facility] to establish care    Acute concerns?: None today's visit    Patient is due to have procedure on hip, will require more blood work in near future.    Follows with Cardiology [facility]  Last PCP?: Dr. Cordon  Allergies: ciprofloxacin, meperidine, morphine, NSAIDs, tylenol  Medical History: Essential HTN, CHF, CKD stage 3, thrombocytopenia, PE, esophageal varicies, cirrhosis, macrocytic anemia.  Medications: carvedilol 6.25 mg, MVI, KCl 20 mEq daily, rifaximin 500 mg, thiamine 100 mg, tramadol 50 mg, pantoprazole 40 mg  Surgical History: 2007 colon surgery, ankle surgery, left inguinal hernia repair  Family History: Mother breast cancer, father prostate cancer and bladder cancer (only known member with prostate cancer); no known autoimmune disease  Social History: Quit smoking 20 years ago, no recent EtOH use, marijuana on occasion    Review of Systems   Constitutional: Negative for chills, diaphoresis, fatigue and fever.   HENT: Negative for congestion, sinus pressure, sneezing and sore throat.    Respiratory: Negative for cough and shortness of breath.    Cardiovascular: Negative for chest pain and palpitations.   Gastrointestinal: Positive for vomiting (intermittently. Occurs every 1-2 months for a few days. Has always had stomach issues.). Negative for abdominal pain, diarrhea and nausea.   Skin: Negative for rash and wound.   Neurological: Negative for dizziness, weakness, numbness and headaches.   Hematological: Negative for adenopathy. Bruises/bleeds easily.       Objective:      Vitals:    06/06/22 1310   BP: 136/74   BP Location: Left arm   Patient Position: Sitting   BP Method: Medium (Manual)   Pulse: 82   Resp: 18   Temp: 98.3 °F (36.8 °C)   TempSrc: Oral   SpO2: 97%   Weight: 84.8 kg (186 lb 13.4 oz)   Height: 6' (1.829 m)     Physical Exam  Vitals  reviewed.   Constitutional:       General: He is not in acute distress.     Appearance: Normal appearance. He is not ill-appearing.   HENT:      Head: Normocephalic and atraumatic.   Eyes:      General:         Right eye: No discharge.         Left eye: No discharge.      Conjunctiva/sclera: Conjunctivae normal.   Cardiovascular:      Rate and Rhythm: Normal rate and regular rhythm.      Pulses: Normal pulses.      Heart sounds: Normal heart sounds.   Pulmonary:      Effort: Pulmonary effort is normal.      Breath sounds: Normal breath sounds.   Abdominal:      General: Abdomen is flat. Bowel sounds are normal. There is no distension.      Palpations: Abdomen is soft. There is no mass.      Tenderness: There is no abdominal tenderness. There is no guarding or rebound.   Musculoskeletal:         General: No deformity.      Cervical back: Neck supple. No rigidity.   Skin:     General: Skin is warm and dry.      Coloration: Skin is not jaundiced.   Neurological:      General: No focal deficit present.      Mental Status: He is alert and oriented to person, place, and time.   Psychiatric:         Mood and Affect: Mood normal.         Behavior: Behavior normal.             Lab Results   Component Value Date     05/26/2022     09/02/2018    K 3.9 05/26/2022     05/26/2022     09/02/2018    CO2 19 (L) 05/26/2022    BUN 14 05/26/2022    CREATININE 1.7 (H) 05/26/2022    CREATININE 0.91 09/02/2018    ANIONGAP 16 05/26/2022     Lab Results   Component Value Date    HGBA1C 5.7 11/12/2020     Lab Results   Component Value Date     (H) 03/07/2022     (H) 07/21/2021    BNP 56 07/15/2021       Lab Results   Component Value Date    WBC 5.82 05/26/2022    HGB 9.6 (L) 05/26/2022    HCT 29.4 (L) 05/26/2022    HCT 29 (L) 01/02/2021    PLT 92 (L) 05/26/2022    GRAN 3.2 05/26/2022    GRAN 54.7 05/26/2022     Lab Results   Component Value Date    CHOL 251 (H) 10/27/2020    HDL 62 10/27/2020    LDLCALC  179 (H) 10/27/2020    TRIG 52 10/27/2020          Current Outpatient Medications:     carvediloL (COREG) 6.25 MG tablet, Take 1 tablet (6.25 mg total) by mouth Daily., Disp: 30 tablet, Rfl: 0    folic acid (FOLVITE) 1 MG tablet, Take 1 tablet (1 mg total) by mouth once daily., Disp: 30 tablet, Rfl: 0    multivitamin Tab, Take 1 tablet by mouth once daily., Disp: 30 tablet, Rfl: 0    potassium chloride SA (K-DUR,KLOR-CON) 20 MEQ tablet, Take 1 tablet (20 mEq total) by mouth once daily., Disp: 30 tablet, Rfl: 0    rifAXIMin (XIFAXAN) 550 mg Tab, Take 1 tablet (550 mg total) by mouth 2 (two) times daily., Disp: 60 tablet, Rfl: 0    thiamine 100 MG tablet, Take 1 tablet (100 mg total) by mouth once daily., Disp: 30 tablet, Rfl: 0    traMADoL (ULTRAM) 50 mg tablet, Take 1 tablet (50 mg total) by mouth every 12 (twelve) hours as needed for Pain., Disp: 60 tablet, Rfl: 0    pantoprazole (PROTONIX) 40 MG tablet, Take 1 tablet (40 mg total) by mouth 2 (two) times daily., Disp: 60 tablet, Rfl: 0        Assessment:       1. Encounter to establish care    2. Need for vaccination           Plan:       Encounter to establish care  Need for vaccination  -     (In Office Administered) Td Vaccine - Preservative Free  -     (In Office Administered) Pneumococcal Conjugate Vaccine (20 Valent) (IM)  - No concerns today's exam  - Suspect bruising related to known thrombocytopenia    RTC in 1 year, contact clinic if new concerns arise

## 2022-06-07 NOTE — TELEPHONE ENCOUNTER
----- Message from Kg Peguero sent at 6/7/2022 10:50 AM CDT -----  Contact: pt  Refill request.      rifAXIMin (XIFAXAN) 550 mg Tab            [id number] - [place] - [address] AT [facility] [address]  [address] [address]  Phone: [phone] Fax: [phone]

## 2022-06-22 ENCOUNTER — TELEPHONE (OUTPATIENT)
Dept: PAIN MEDICINE | Facility: CLINIC | Age: 47
End: 2022-06-22
Payer: MEDICARE

## 2022-06-22 NOTE — TELEPHONE ENCOUNTER
Staff spoke with the patient regarding their procedure with Dr. De La Garza scheduled on 06/24/2022; the patient was provided the Arrival Information and scheduled time 07:00AM.     Staff left detailed vm with procedure time and instructions.     Thank you,

## 2022-06-23 ENCOUNTER — TELEPHONE (OUTPATIENT)
Dept: PRIMARY CARE CLINIC | Facility: CLINIC | Age: 47
End: 2022-06-23
Payer: MEDICARE

## 2022-06-23 RX ORDER — POTASSIUM CHLORIDE 20 MEQ/1
20 TABLET, EXTENDED RELEASE ORAL DAILY
Qty: 30 TABLET | Refills: 0 | Status: SHIPPED | OUTPATIENT
Start: 2022-06-23 | End: 2022-06-30 | Stop reason: SDUPTHER

## 2022-06-23 RX ORDER — POTASSIUM CHLORIDE 20 MEQ/1
TABLET, EXTENDED RELEASE ORAL
Qty: 90 TABLET | OUTPATIENT
Start: 2022-06-23

## 2022-06-23 NOTE — TELEPHONE ENCOUNTER
No new care gaps identified.  [facility] Embedded Care Gaps. Reference number: [id number]. 6/23/2022   12:18:48 PM CDT

## 2022-06-23 NOTE — TELEPHONE ENCOUNTER
----- Message from Johny Reeves sent at 6/23/2022 10:40 AM CDT -----  Contact: Pt [phone]  Caller is requesting an earlier appointment then we can schedule.  Caller is requesting a message be sent to the provider.  If this is for urgent care symptoms, did you offer other providers at this location, providers at other locations, or [facility]? Yes, the 1st available for any doctor at your is 7/11/22    When is the next available appointment with their provider:  7/24/22  Reason for the appointment:  Unexplained bruises popped up all over his body  Patient preference of timeframe to be scheduled:  Any day and any time  Would the patient like a call back, or a response through their [facility] portal?:   Either

## 2022-06-23 NOTE — TELEPHONE ENCOUNTER
No new care gaps identified.  [facility] Embedded Care Gaps. Reference number: [id number]. 6/23/2022   11:17:22 AM HAKANT

## 2022-06-23 NOTE — TELEPHONE ENCOUNTER
----- Message from Johny Reeves sent at 6/23/2022 10:33 AM CDT -----  Requesting an RX refill or new RX.  Is this a refill or new RX: Refill  RX name and strength potassium chloride SA (K-DUR,KLOR-CON) 20 MEQ tablet and rifAXIMin (XIFAXAN) 550 mg Tab, thiamine 100 MG tablet, carvediloL (COREG) 6.25 MG tablet, multivitamin Tab, and folic acid (FOLVITE) 1 MG tablet  Is this a 30 day or 90 day RX:   Pharmacy name and phone # [id number] - [id number] - [address] AT [facility] [address]  Phone:  [phone]  Fax:  [phone]    Comments: He said #1 he didn't  the potassium and the pharm put it back and #2 the rifAXIMin needs a PA

## 2022-06-23 NOTE — TELEPHONE ENCOUNTER
Refill Decision Note   Irvin Diaz  is requesting a refill authorization.  Brief Assessment and Rationale for Refill:  Quick Discontinue     Medication Therapy Plan:       Medication Reconciliation Completed: No   Comments:     No Care Gaps recommended.     Note composed:12:24 PM 06/23/2022

## 2022-06-24 ENCOUNTER — PATIENT MESSAGE (OUTPATIENT)
Dept: SURGERY | Facility: HOSPITAL | Age: 47
End: 2022-06-24
Payer: MEDICARE

## 2022-06-24 ENCOUNTER — HOSPITAL ENCOUNTER (OUTPATIENT)
Facility: HOSPITAL | Age: 47
Discharge: HOME | End: 2022-06-24
Attending: STUDENT IN AN ORGANIZED HEALTH CARE EDUCATION/TRAINING PROGRAM | Admitting: STUDENT IN AN ORGANIZED HEALTH CARE EDUCATION/TRAINING PROGRAM
Payer: MEDICARE

## 2022-06-24 VITALS
BODY MASS INDEX: 25.19 KG/M2 | HEIGHT: 72 IN | HEART RATE: 83 BPM | OXYGEN SATURATION: 98 % | WEIGHT: 186 LBS | DIASTOLIC BLOOD PRESSURE: 83 MMHG | TEMPERATURE: 99 F | RESPIRATION RATE: 20 BRPM | SYSTOLIC BLOOD PRESSURE: 145 MMHG

## 2022-06-24 DIAGNOSIS — G89.29 CHRONIC LEFT HIP PAIN: Primary | ICD-10-CM

## 2022-06-24 DIAGNOSIS — G89.29 CHRONIC HIP PAIN: ICD-10-CM

## 2022-06-24 DIAGNOSIS — M25.552 CHRONIC LEFT HIP PAIN: Primary | ICD-10-CM

## 2022-06-24 DIAGNOSIS — M25.559 CHRONIC HIP PAIN: ICD-10-CM

## 2022-06-24 PROCEDURE — 64450 PR NERVE BLOCK INJ, ANES/STEROID, OTHER PERIPHERAL: ICD-10-PCS | Mod: LT,,, | Performed by: STUDENT IN AN ORGANIZED HEALTH CARE EDUCATION/TRAINING PROGRAM

## 2022-06-24 PROCEDURE — 77002 PR FLUOROSCOPIC GUIDANCE NEEDLE PLACEMENT: ICD-10-PCS | Mod: 26,,, | Performed by: STUDENT IN AN ORGANIZED HEALTH CARE EDUCATION/TRAINING PROGRAM

## 2022-06-24 PROCEDURE — 64447 NJX AA&/STRD FEMORAL NRV IMG: CPT | Mod: 59,LT,, | Performed by: STUDENT IN AN ORGANIZED HEALTH CARE EDUCATION/TRAINING PROGRAM

## 2022-06-24 PROCEDURE — 77002 NEEDLE LOCALIZATION BY XRAY: CPT | Mod: 26,,, | Performed by: STUDENT IN AN ORGANIZED HEALTH CARE EDUCATION/TRAINING PROGRAM

## 2022-06-24 PROCEDURE — 64447 NJX AA&/STRD FEMORAL NRV IMG: CPT | Mod: LT | Performed by: STUDENT IN AN ORGANIZED HEALTH CARE EDUCATION/TRAINING PROGRAM

## 2022-06-24 PROCEDURE — 64450 NJX AA&/STRD OTHER PN/BRANCH: CPT | Mod: LT,,, | Performed by: STUDENT IN AN ORGANIZED HEALTH CARE EDUCATION/TRAINING PROGRAM

## 2022-06-24 PROCEDURE — 25000003 PHARM REV CODE 250: Performed by: STUDENT IN AN ORGANIZED HEALTH CARE EDUCATION/TRAINING PROGRAM

## 2022-06-24 PROCEDURE — 64450 NJX AA&/STRD OTHER PN/BRANCH: CPT | Mod: LT

## 2022-06-24 PROCEDURE — 64447 PR NERVE BLOCK INJ, ANES/STEROID, FEMORAL, INCL IMAG GUIDANCE: ICD-10-PCS | Mod: 59,LT,, | Performed by: STUDENT IN AN ORGANIZED HEALTH CARE EDUCATION/TRAINING PROGRAM

## 2022-06-24 RX ORDER — LIDOCAINE HYDROCHLORIDE 10 MG/ML
INJECTION, SOLUTION EPIDURAL; INFILTRATION; INTRACAUDAL; PERINEURAL
Status: DISCONTINUED | OUTPATIENT
Start: 2022-06-24 | End: 2022-06-24 | Stop reason: HOSPADM

## 2022-06-24 RX ORDER — BUPIVACAINE HYDROCHLORIDE 2.5 MG/ML
INJECTION, SOLUTION EPIDURAL; INFILTRATION; INTRACAUDAL
Status: DISCONTINUED | OUTPATIENT
Start: 2022-06-24 | End: 2022-06-24 | Stop reason: HOSPADM

## 2022-06-24 NOTE — OP NOTE
"PROCEDURE:  left Articular Branches of the Femoral and Obturator Nerve Block With Fluoroscopic Guidance    Diagnosis: Chronic Hip Pain  CPT code:  93121 (femoral nerve), 02800 (obturator nerve), 45601 (fluoroscopic guidance)    Block # 1.    Postprocedural Diagnosis: Same  Needle Type: - 5" 22 Gauge Spinal Needle    Solution injected: 1cc 0.25% Bupivacaine at each location    Estimated Blood Loss - <2ml  Drains: None  Specimens Removed: None  Urine Output - Not Measured  Complications: None  Outcome: Good    Informed Consent:  The patient's condition and proposed procedures, risks (including complications of nerve damage,  bleeding, infection, and failure of pain relief), and alternatives were discussed with the patient or responsible party.  The patient's/responsible party's questions were answered.  The patient/responsible party appeared to understand and chose to proceed.  Informed consent was obtained.    PROCEDURE IN DETAIL:   The patient was taken back to the OR fluoroscopy suite and placed in a supine position. The skin overlying the injection site was prepped and draped in an aseptic fashion.     AP fluoroscopy was used to identify the landmarks which included the greater trochanter of the femur, the femoral head, and acetabulum.  The fluoroscope was oriented in an anterior posterior projection such the targets, the anterolateral acetabulum (for the femoral articular branches) and Kholers teardrop (for the obturator articular branch) were easily visualized. The approach was lateral to the femoral nerve, artery, and vein for the femoral articular branch and medial to the femoral nerve/artery/vein for the obturator articular branch.  The skin entry site was marked.    Procedural Pause:  A procedural pause verifying correct patient, medical record number, allergies, medications to be administered, current vital signs, and surgical site was performed immediately prior to beginning the procedure.    The skin and " subcutaneous tissue overlying the target site of injection was anesthetized using 5 ml of 1% lidocaine MPF with a  25-gauge, 1½ -inch needle.   The above noted needle was directed until the anterolateral acetabulum was contacted near the 12 o'clock position for the FEMORAL articular nerves, and the lateral border of Kholer's point (femoral incisura) was contacted for the OBTURATOR articular nerves.    Contrast was not injected to look for vascular flow.  Next, the above noted diagnostic/therapeutic solution was injected.  The needles were then retracted and removed.     The same procedural technique outlined above was not repeated on the OPPOSITE side.    The heart rate, pulse oximetry, and blood pressure were continuously monitored throughout the procedure (see Nursing Flowsheet for details).  The patient tolerated the procedure well..The patient was carefully escorted to the recovery room in stable condition.  Patient was monitored by RN for recovery period. After meeting discharge criteria, the patient was discharged with .     DISCUSSION:  FEMORAL and OBTURATOR articular branch nerve blocks were performed today. The purpose was to provide diagnostic information about the patient's hip pain to determine if he is a candidate for radiofrequency ablation of the same nerves.     The patient was advised to relax and avoid any heavy lifting or excessive bending for the rest of the day. he was advised that he may return to his usual activities after 24 hours if he is otherwise feeling well.  The patient was advised not to bathe or soak in water for 24 hours but that showering would be acceptable.  The patient was instructed that if he experienced fever or chills, new weakness, new sensory changes, any changes in bowel or bladder habits, worsening back pain, new headache, neck stiffness, or other new symptoms, that he should contact the pain clinic immediately or dial 911 if unable to reach the pain clinic.  We  recommended to the patient that they not travel out of the area for the next 4 days following the procedure so that he can be reevaluated if necessary.    Note Electronically Signed By:  Robbin De La Garza  06/24/2022

## 2022-06-24 NOTE — PLAN OF CARE
VSS. Patient able to tolerate oral liquids. Patient denies c/o pain. Patient/family received home medication per bedside delivery. Dressing intact. No distress noted. Patient states he is ready for discharge. Discharge instructions reviewed with patient and family, verbalized understanding. IV discontinued with catheter tip intact. Staff at bedside to help patient dress. Patient wheeled to lobby via staff.

## 2022-06-24 NOTE — PLAN OF CARE
Pre Op Check list complete. Pt resting in bed with call light in reach. Pt father brought to bedside and belongings place in locker. Cane in PP1.

## 2022-06-24 NOTE — PATIENT INSTRUCTIONS
[facility]  [facility]  Messaging service # [phone]    POST-PROCEDURE INSTRUCTIONS:    What you need to do:    Keep a record of your response to the injection you had today.    How much relief did you get?   When did the relief start and how long did it last?  Were you able to decrease the use of any of your pain medications?  Were you able to increase your level of activity?  How long did the relief last?    What to watch out for:    If you experience any of the following symptoms after your procedure, please notify the messaging service immediately (see above for contact information):   fever (increased oral temperature)   bleeding or swelling at the injection site,    drainage, rash or redness at the injection site    possible signs of infection    increased pain at the injection site   worsening of your usual pain   severe headache   new or worsening numbness    new arm and/or leg weakness, or    changes in bowel and/or bladder function: urinating or defecating on yourself and not knowing that you did it.    PLEASE FOLLOW ALL INSTRUCTIONS CAREFULLY     Do not engage in strenuous activity (e.g., lifting or pushing heavy objects or repeated bending) for 24 hours.     Do not take a bath, swim or use Jacuzzi for 24 hours after procedure. (A shower is fine).   Remove any Band-Aids when you get home.    Use cold/ice, as needed for comfort.  We recommend the use of cold therapy alternating on for 20 minutes, off for 20 minutes.    Do not apply direct heat (heating pad or heat packs) to the injection site for 24 hours.     Resume your usual medications, unless instructed otherwise by your Pain Physician.     If you are on warfarin (Coumadin) or other blood thinner, resume this medication as instructed by your prescribing Physician.    IF AT ANY POINT YOU ARE VERY CONCERNED ABOUT YOUR SYMPTOMS, PLEASE GO TO THE EMERGENCY ROOM.    If you develop worsening pain, weakness,  numbness, lose bowel or bladder control (i.e., having an accident where you did not even know you had to go to the bathroom and suddenly noticed you soiled yourself), saddle anesthesia (a loss of sensation restricted to the area of the buttocks, anus and between the legs -- i.e., those parts of your body that would touch a saddle if you were sitting on one) you need to go immediately to the emergency department for evaluation and treatment.    ----------------------------------------------------------------------------------------------------------------------------------------------------------------  If you received Sedation please read the following instructions:  POST SEDATION INSTRUCTIONS    Today you received intravenous medication (also known as sedation) that was used to help you relax and/or decrease discomfort during your procedure. This medication will be acting in your body for the next 24 hours, so you might feel a little tired or sleepy. This feeling will slowly wear off.   Common side effects associated with these medications include: drowsiness, dizziness, sleepiness, confusion, feeling excited, difficulty remembering things, lack of steadiness with walking or balance, loss of fine muscle control, slowed reflexes, difficulty focusing, and blurred vision.  Some over-the-counter and prescription medications (e.g., muscle relaxants, opioids, mood-altering medications, sedatives/hypnotics, antihistamines) can interact with the intravenous medication you received and cause an increased risk of the side effects listed above in addition to other potentially life threatening side effects. Use extreme caution if you are taking such medications, and consult with your Pain Physician or prescribing physician if you have any questions.  For the next 12-24 hours:    DO NOT--Drive a car, operate machinery or power tools   DO NOT--Drink any alcoholic beverages (not even beer), they may dangerously increase the risk  of side effects.    DO NOT--Make any important legal or business decisions or sign important documents.  We advise you to have someone to assist you at home. Move slowly and carefully. Do not make sudden changes in position. Be aware of dizziness or light-headedness and move accordingly.   If you seek medical treatment within 24 hours, let the nurse or doctor caring for you know that you have received the above medications. If you have any questions or concerns related to your sedation or treatment today please contact us.

## 2022-06-24 NOTE — DISCHARGE SUMMARY
[facility] - Surgery (Hospital)  Discharge Note  Short Stay    Procedure(s) (LRB):  Left Hip femoral-obturator accessory nerve block (Left)    OUTCOME: Patient tolerated treatment/procedure well without complication and is now ready for discharge.    DISPOSITION: Home or Self Care    FINAL DIAGNOSIS:  <principal problem not specified>    FOLLOWUP: In clinic    DISCHARGE INSTRUCTIONS:  No discharge procedures on file.     TIME SPENT ON DISCHARGE: 10 minutes

## 2022-06-27 ENCOUNTER — TELEPHONE (OUTPATIENT)
Dept: PAIN MEDICINE | Facility: CLINIC | Age: 47
End: 2022-06-27
Payer: MEDICARE

## 2022-06-27 ENCOUNTER — TELEPHONE (OUTPATIENT)
Dept: PRIMARY CARE CLINIC | Facility: CLINIC | Age: 47
End: 2022-06-27
Payer: MEDICARE

## 2022-06-27 ENCOUNTER — TELEPHONE (OUTPATIENT)
Dept: TRANSPLANT | Facility: CLINIC | Age: 47
End: 2022-06-27
Payer: MEDICARE

## 2022-06-27 ENCOUNTER — OFFICE VISIT (OUTPATIENT)
Dept: HEPATOLOGY | Facility: CLINIC | Age: 47
End: 2022-06-27
Payer: MEDICARE

## 2022-06-27 DIAGNOSIS — K76.82 HEPATIC ENCEPHALOPATHY: Primary | ICD-10-CM

## 2022-06-27 DIAGNOSIS — K70.31 ALCOHOLIC CIRRHOSIS OF LIVER WITH ASCITES: ICD-10-CM

## 2022-06-27 DIAGNOSIS — F10.10 ALCOHOL ABUSE: ICD-10-CM

## 2022-06-27 PROCEDURE — 99213 PR OFFICE/OUTPT VISIT, EST, LEVL III, 20-29 MIN: ICD-10-PCS | Mod: 95,,, | Performed by: INTERNAL MEDICINE

## 2022-06-27 PROCEDURE — 99213 OFFICE O/P EST LOW 20 MIN: CPT | Mod: 95,,, | Performed by: INTERNAL MEDICINE

## 2022-06-27 NOTE — LETTER
June 28, 2022    Irvin Diaz  [address] [facility] [address]        Dear Irvin Diaz  MRN: [id number]    Securing your caregivers is one of your most important actions during your transplant work-up. Their care and support are so vital, you can only be transplanted if you have reliable caregivers.     What You Need to Know:   Due to current Covid restrictions, one adult caregiver-s must meet with the transplant  during your work-up. The second caregiver should be available by phone. We need to make sure that both caregivers are very clear about what is expected of them.    Why are my caregivers so important?  Your caregivers play a major role before, during and especially after your transplant. They help you do things that you physically cannot do as you get better. They support you mentally and emotionally as you handle the ups and downs of the transplant process.   They also transport you to your follow-up appointments during your post-transplant recovery.    How many caregivers do I need?  You must have at least two adult caregivers:   ? Primary caregiver - the main person to stay with you, help you and coordinate other caregivers  AND   ? Secondary caregiver - someone who is committed to serving as a reliable back-up caregiver for you    We strongly recommend that you have a third caregiver to also help in case they are needed. The more caregivers you have, the easier it will be for your primary caregiver to call on back-ups when needed.    Who can be my caregiver?  Your caregivers may be a spouse, partner, parent, adult family member or close friend, or some combination of these persons. Note: Your caregiver cannot be home health or a random person you hire. Caregivers must be reliable and committed. They must be able to provide assistance and be available with short notice, as time of transplant typically cannot be determined ahead of time.    How long do my caregivers need to help  me?  One of your two adult caregivers must be able to be with you and help you 24 hours a day, seven days a week, for at least three months - or longer as needed, until your doctor says you can be alone.    What do my caregivers agree to do for me?  For at least three months - or longer as needed, your caregivers agree to:    ? Care for you 24-hours a day, seven days a week   ? Stay locally in the [place] area if you live more than 1-hour away, or if recommended by the transplant team  ? Transporting you where you to need to go before and after transplant. This includes transporting you for:  o All needed clinic/hospital visits, labs and procedures  o Emergencies  ? Be available to you as needed while you are in the hospital for your transplant  ? Be with you for all teaching in the hospital prior to discharge  ? Stay actively involved with all aspects of your care   o Learn what symptoms to look for before and after transplant  o Use good judgment about any complications  o Tell the transplant team right away if you have any concerns or health changes before and after transplant  ? Learn your medicines; Make sure you are taking them the right way and at the right time, on the right days  ? Be a strong, stable support for you to help you  o [place] during tough emotional times  o Communicate directly with the health care team members  o Stay sober and drug-free  o Take their own medications on time  o Follow the advice of your transplant team, including mental health and social work recommendations  ? Be able to take off from work/school without worrying about unpaid and/or missed work/school days  ? Have back-up caregiver plans for the unexpected times when they cannot care for you  o Take breaks and bring in back-up caregivers when needed  o Coordinate caregiver plan schedule - who is coming when and for how many days each     Thank you for choosing us as your transplant center. We are committed to providing you  with excellent care. We want the best for you!     Be sure to bring your primary  adult caregiver with you for your workup appointments.    Sincerely,  Your Liver Transplant Team  [facility]  [facility] [place] [address]  [phone]

## 2022-06-27 NOTE — LETTER
June 28, 2022    Irvin Estradaruder  [address] [address]      Dear Irvin Diaz:  MRN: [id number]    You have been referred to the [facility] in [place] for evaluation for a liver transplant. Thank you for your interest in our program. Since its creation in 1987, we have performed over 2000 liver transplants. Our success rates have ranked us as one of the top programs in the country.     We are excited to have you begin your transplant evaluation on. Please make the necessary arrangements to be in our transplant center for 2 days on these dates July 14 and 15. If you are more than 30 minutes late for these appointments, your evaluation will have to be rescheduled.    All appointments on the first day except for ultrasound and CXR will be located in our Transplant Clinic on the 1st floor of the clinic.      Your appointment will include the following:   · Blood work (if you are told that you must fast, you may take your medications with a sip of water  · Educational seminar   · Ultrasound/MRI/Bone Mineral Density   · 2 D echo and EKG   · /Surgeon/Infectious Disease provider/Dietician/  · Immunizations      Things to remember for your initial appointment: Things to remember for your appointments for transplant evaluation:   · We have enclosed an instruction sheet for directions. Please allow plenty of time for travel/traffic.  · A caregiver is REQUIRED to attend these appointments with you.  · Bring ALL insurance information including medication card and  your immunization records.  · Bring a current list of your medications and your medication bottles.  · Please bring copies of any outside medical reports from the past year, such as mammogram, PAP smear, ultrasounds, CAT scans, colonoscopy, endoscopy, cardiac angiogram, cardiac stress tests or angioplasty reports. This may prevent us from repeating tests.     To reschedule your  appointments, please call [phone] or [phone] ext. [phone] and ask for an . Failure to cancel in advance could result in a $50.00 cancellation fee.     We look forward to meeting you and assisting you through the evaluation process.     Sincerely,    [facility]  [address] [place] [address]  [phone]

## 2022-06-27 NOTE — TELEPHONE ENCOUNTER
I called and left him a message.  Lets cancel the RFA, and schedule a follow up in the office in 1-2 weeks.  Thanks!

## 2022-06-27 NOTE — TELEPHONE ENCOUNTER
Referral received from Dr. Arreaga  Initial  referral from Dr. Cyrus Gauthier  Patient with Alcoholic Cirrhosis. MELD 22  ICD-10:    Referred for liver transplant for EVALUATION.    Referral completed and forwarded to [facility].    Insurance: in Epic

## 2022-06-27 NOTE — TELEPHONE ENCOUNTER
Staff spoke with patient regarding pain he is having after his procedure on the 24th of July. Patient discussed with staff about his hematoma and was told that we would cancel upcoming ablation due to the hematoma. Patient was also informed that the provider will send in a refill of pain medictation once he has finished what the ER prescribed him. Patient verbalized understanding

## 2022-06-27 NOTE — TELEPHONE ENCOUNTER
----- Message from Teresa Ciro sent at 6/27/2022  8:15 AM CDT -----  Regarding: Pt called to regarding a previous pharmacy authorization for refills that hasn't been sent to the [facility] and pt would like a call back this morning asap  Rx Refill Request:    Pt called to regarding a previous pharmacy authorization for refills for all of his medications on file that hasn't been sent to the [facility] and pt would like a call back this morning asa.    Pt can be reached at [phone]

## 2022-06-27 NOTE — Clinical Note
Can we please start a liver T evaluation and first thing we need to do is a set of labs and PEth before we proceed

## 2022-06-27 NOTE — PROGRESS NOTES
Subjective:       Patient ID: Irvin Diaz Jr. is a 46 y.o. male.    Chief Complaint: No chief complaint on file.  The patient location is: Home  The chief complaint leading to consultation is: Cirrhosis    Visit type: audiovisual    Face to Face time with patient: 20 minutes of total time spent on the encounter, which includes face to face time and non-face to face time preparing to see the patient (eg, review of tests), Obtaining and/or reviewing separately obtained history, Documenting clinical information in the electronic or other health record, Independently interpreting results (not separately reported) and communicating results to the patient/family/caregiver, or Care coordination (not separately reported).         Each patient to whom he or she provides medical services by telemedicine is:  (1) informed of the relationship between the physician and patient and the respective role of any other health care provider with respect to management of the patient; and (2) notified that he or she may decline to receive medical services by telemedicine and may withdraw from such care at any time.    Notes:   HPI  I saw this 46 y.o. man with mildly decompensated cirrhosis related to alcohol for follow up.  I last saw him in clinic in May 2022.      He was previously followed by GI at [facility] but has recently been coming to [facility].    He has had some minor variceal bleeds in the past and his last admission was in the beginning of May 2022 at [facility] [place], operated by [facility]. Last banded in Aug 2020 but his varices have since been characterized as small.    He does not report any edema or ascites but his imaging does show abdominal fluid.  No episodes of HE.    EGD: 5/3/22  - Grade I esophageal varices.                          - LA Grade A reflux esophagitis with no bleeding.                          - Gastritis.                          - Non-bleeding gastric ulcer with no stigmata of                          bleeding.                           - Duodenitis.     CT abdo: 3/7/2022  Mild circumferential wall thickening involving the proximal colon extending from the ileocolic anastomosis at the hepatic flexure through around the level of the splenic flexure suggests inflammatory or infectious colitis.  No convincing transmural inflammation is seen.  Postoperative changes of proximal colectomy and scattered mild diverticulosis of the more distal colon.  No convincing diverticulitis, bowel obstruction, free air or abscess.  Findings of cirrhosis and mild abdominal ascites as described essentially stable.  Left hip findings suggesting AVN as described.    Alcohol hx:  - 1/5 of gin per day  - completed outpatient rehab at [facility] and managed to stay off alcohol for 5 months but relapsed about 1 month ago.  - stopped drinking in late April 2022.    No DUI  No legal troubles with alcohol      MELD-Na score: 22 at 6/26/2022  9:31 PM  MELD score: 21 at 6/26/2022  9:31 PM  Calculated from:  Serum Creatinine: 1.5 mg/dL at 6/26/2022  9:31 PM  Serum Sodium: 136 mmol/L at 6/26/2022  9:31 PM  Total Bilirubin: 5.7 mg/dL at 6/26/2022  9:31 PM  INR(ratio): 1.4 at 6/26/2022  9:31 PM  Age: 46 years    PMH:  Colon resection- 2007- mass- not Ca  Diverticulitis  CKD  Hypertension  Hip OA  PE- 2018- stopped warfarin due to GI bleed      SH:  Disabled  Ex-smoker (1 pack robert day for 3 years in his 20s)  Lives with mother  6 kids- healthy    FH:  Nil liver      Review of Systems   Constitutional: Negative for activity change, appetite change, chills, fatigue, fever and unexpected weight change.   HENT: Negative for hearing loss.    Eyes: Negative for discharge and visual disturbance.   Respiratory: Negative for cough, chest tightness, shortness of breath and wheezing.    Cardiovascular: Negative for chest pain, palpitations and leg swelling.   Gastrointestinal: Negative for abdominal distention, abdominal pain, constipation, diarrhea and nausea.   Genitourinary:  Negative for dysuria and frequency.   Musculoskeletal: Negative for arthralgias and back pain.   Skin: Negative for pallor and rash.   Neurological: Negative for dizziness, tremors, speech difficulty and headaches.   Hematological: Negative for adenopathy.   Psychiatric/Behavioral: Negative for agitation and confusion.         Lab Results   Component Value Date    ALT 34 06/26/2022     (H) 06/26/2022    ALKPHOS 79 06/26/2022    BILITOT 5.7 (H) 06/26/2022     Past Medical History:   Diagnosis Date    Alcoholic cirrhosis of liver     Arthritis     ATN (acute tubular necrosis)     CHF (congestive heart failure)     Diverticulitis 01/2020    Esophageal varices     GERD (gastroesophageal reflux disease)     GI bleed     Hip arthritis     Left    Hypertension     Liver cirrhosis     Macrocytic anemia     Pulmonary embolism 08/2018    Unprovoked DVT.  Stop Coumadin due to GIB    Thrombocytopenia      Past Surgical History:   Procedure Laterality Date    ANKLE SURGERY      BLOCK, NERVE, PERIPHERAL Left 6/24/2022    Procedure: Left Hip femoral-obturator accessory nerve block;  Surgeon: Robbin De La Garza DO;  Location: [facility];  Service: Pain Management;  Laterality: Left;    COLON SURGERY  2007    COL[facility]OSCOPY  09/05/2019    [facility]    COLONOSCOPY N/A 2/17/2021    Procedure: COLONOSCOPY;  Surgeon: Renea Billingsley MD;  Location: [facility];  Service: Endoscopy;  Laterality: N/A;    COLONOSCOPY W/ BIOPSIES  02/17/2021    ESOPHAGOGASTRODUODENOSCOPY N/A 7/26/2019    Procedure: EGD (ESOPHAGOGASTRODUODENOSCOPY);  Surgeon: Brian Trivedi MD;  Location: [facility];  Service: Endoscopy;  Laterality: N/A;    ESOPHAGOGASTRODUODENOSCOPY N/A 4/8/2020    Procedure: EGD (ESOPHAGOGASTRODUODENOSCOPY);  Surgeon: Donn Acuna MD;  Location: [facility];  Service: Endoscopy;  Laterality: N/A;    ESOPHAGOGASTRODUODENOSCOPY N/A 1/3/2021    Procedure: EGD (ESOPHAGOGASTRODUODENOSCOPY)- coffee ground emesis, hx  varices;  Surgeon: Steve Chairez MD;  Location: [facility] ENDO;  Service: Endoscopy;  Laterality: N/A;    ESOPHAGOGASTRODUODENOSCOPY N/A 5/3/2021    Procedure: EGD (ESOPHAGOGASTRODUODENOSCOPY);  Surgeon: Jeanmarie Ngo MD;  Location: [facility] ENDO;  Service: Endoscopy;  Laterality: N/A;    ESOPHAGOGASTRODUODENOSCOPY N/A 7/19/2021    Procedure: ESOPHAGOGASTRODUODENOSCOPY (EGD);  Surgeon: Claudio Medeiros MD;  Location: [facility];  Service: Endoscopy;  Laterality: N/A;    ESOPHAGOGASTRODUODENOSCOPY  12/20/2021    ESOPHAGOGASTRODUODENOSCOPY N/A 12/20/2021    Procedure: EGD (ESOPHAGOGASTRODUODENOSCOPY);  Surgeon: Ajay Jackson MD;  Location: [facility];  Service: Endoscopy;  Laterality: N/A;    ESOPHAGOGASTRODUODENOSCOPY N/A 5/3/2022    Procedure: EGD (ESOPHAGOGASTRODUODENOSCOPY);  Surgeon: Brian Trivedi MD;  Location: [facility];  Service: Endoscopy;  Laterality: N/A;    HERNIA REPAIR Left     Inguinal     Current Outpatient Medications   Medication Sig    carvediloL (COREG) 6.25 MG tablet Take 1 tablet (6.25 mg total) by mouth Daily.    folic acid (FOLVITE) 1 MG tablet Take 1 tablet (1 mg total) by mouth once daily.    multivitamin Tab Take 1 tablet by mouth once daily.    oxyCODONE (ROXICODONE) 5 MG immediate release tablet Take 1 tablet (5 mg total) by mouth 3 (three) times daily as needed (pain).    pantoprazole (PROTONIX) 40 MG tablet Take 1 tablet (40 mg total) by mouth 2 (two) times daily.    potassium chloride SA (K-DUR,KLOR-CON) 20 MEQ tablet Take 1 tablet (20 mEq total) by mouth once daily.    rifAXIMin (XIFAXAN) 550 mg Tab Take 1 tablet (550 mg total) by mouth 2 (two) times daily.    thiamine 100 MG tablet Take 1 tablet (100 mg total) by mouth once daily.     No current facility-administered medications for this visit.       Objective:        EXAM NOT DONE- VIDEO VISIT    Assessment:       1. Hepatic encephalopathy    2. Alcohol abuse    3. Alcoholic cirrhosis of liver with ascites         Plan:   This 46 year old man has decompensated alcohol related cirrhosis with jaundice and mild ascites. His MELD score is now 22 mainly driven by his bilirubin and his INR.    I explained to him that his liver function is severely imparied but can recover with prolonged abstinence. Explained that he should aim for liflong complete abstinence.    - stopped drinking 2 months ago  - no edema or ascites  - no HE    Hematoma post hip injection    Would like to proceed with LT evaluation.

## 2022-06-27 NOTE — TELEPHONE ENCOUNTER
----- Message from Carmela Hernandez sent at 6/27/2022  1:19 PM CDT -----  Regarding: pt advice  Contact: pt @ [phone]  Pt calling to speak with someone in Dr. Robertson's office regarding his surgery. Please call.

## 2022-06-28 ENCOUNTER — TELEPHONE (OUTPATIENT)
Dept: TRANSPLANT | Facility: CLINIC | Age: 47
End: 2022-06-28
Payer: MEDICARE

## 2022-06-28 DIAGNOSIS — K70.30 ALCOHOLIC CIRRHOSIS OF LIVER WITHOUT ASCITES: ICD-10-CM

## 2022-06-28 DIAGNOSIS — Z76.82 AWAITING ORGAN TRANSPLANT STATUS: Primary | ICD-10-CM

## 2022-06-28 NOTE — TELEPHONE ENCOUNTER
Spoke with the patient's caregiver and informed him of the appointment to have additional labs done .  The appointment is scheduled for 6/30 at 1pm.

## 2022-06-28 NOTE — TELEPHONE ENCOUNTER
Patient called and notified of clearance for Transplant evaluation.  Patient and caregiver  given information about [facility]  and scheduled evaluation for July 14 and 15..  Patient questions answered at this time.  Patient's father will be the primary caregiver.

## 2022-06-29 ENCOUNTER — TELEPHONE (OUTPATIENT)
Dept: TRANSPLANT | Facility: CLINIC | Age: 47
End: 2022-06-29
Payer: MEDICARE

## 2022-06-29 ENCOUNTER — TELEPHONE (OUTPATIENT)
Dept: TRANSPLANT | Facility: CLINIC | Age: 47
End: 2022-06-29

## 2022-06-30 ENCOUNTER — TELEPHONE (OUTPATIENT)
Dept: PAIN MEDICINE | Facility: CLINIC | Age: 47
End: 2022-06-30
Payer: MEDICARE

## 2022-06-30 ENCOUNTER — TELEPHONE (OUTPATIENT)
Dept: TRANSPLANT | Facility: CLINIC | Age: 47
End: 2022-06-30
Payer: MEDICARE

## 2022-06-30 ENCOUNTER — TELEPHONE (OUTPATIENT)
Dept: PRIMARY CARE CLINIC | Facility: CLINIC | Age: 47
End: 2022-06-30

## 2022-06-30 ENCOUNTER — OFFICE VISIT (OUTPATIENT)
Dept: PRIMARY CARE CLINIC | Facility: CLINIC | Age: 47
End: 2022-06-30
Payer: MEDICARE

## 2022-06-30 VITALS
DIASTOLIC BLOOD PRESSURE: 72 MMHG | SYSTOLIC BLOOD PRESSURE: 124 MMHG | RESPIRATION RATE: 18 BRPM | OXYGEN SATURATION: 99 % | HEART RATE: 102 BPM | TEMPERATURE: 100 F | WEIGHT: 192.44 LBS | BODY MASS INDEX: 26.07 KG/M2 | HEIGHT: 72 IN

## 2022-06-30 DIAGNOSIS — M25.552 CHRONIC LEFT HIP PAIN: ICD-10-CM

## 2022-06-30 DIAGNOSIS — I85.10 SECONDARY ESOPHAGEAL VARICES WITHOUT BLEEDING: ICD-10-CM

## 2022-06-30 DIAGNOSIS — K70.30 ALCOHOLIC CIRRHOSIS OF LIVER WITHOUT ASCITES: ICD-10-CM

## 2022-06-30 DIAGNOSIS — G89.29 CHRONIC PAIN OF LEFT LOWER EXTREMITY: ICD-10-CM

## 2022-06-30 DIAGNOSIS — G89.29 CHRONIC LEFT HIP PAIN: ICD-10-CM

## 2022-06-30 DIAGNOSIS — L30.9 ECZEMA, UNSPECIFIED TYPE: ICD-10-CM

## 2022-06-30 DIAGNOSIS — R00.2 INTERMITTENT PALPITATIONS: ICD-10-CM

## 2022-06-30 DIAGNOSIS — M79.605 CHRONIC PAIN OF LEFT LOWER EXTREMITY: ICD-10-CM

## 2022-06-30 DIAGNOSIS — Z09 FOLLOW-UP EXAM: Primary | ICD-10-CM

## 2022-06-30 DIAGNOSIS — D69.6 THROMBOCYTOPENIA: ICD-10-CM

## 2022-06-30 DIAGNOSIS — I10 ESSENTIAL HYPERTENSION: ICD-10-CM

## 2022-06-30 DIAGNOSIS — M87.052 AVASCULAR NECROSIS OF HIP, LEFT: Primary | ICD-10-CM

## 2022-06-30 DIAGNOSIS — R79.1 ELEVATED CLOTTING TIME: ICD-10-CM

## 2022-06-30 PROCEDURE — 99999 PR PBB SHADOW E&M-EST. PATIENT-LVL IV: ICD-10-PCS | Mod: PBBFAC,TXP,, | Performed by: STUDENT IN AN ORGANIZED HEALTH CARE EDUCATION/TRAINING PROGRAM

## 2022-06-30 PROCEDURE — 99999 PR PBB SHADOW E&M-EST. PATIENT-LVL IV: CPT | Mod: PBBFAC,TXP,, | Performed by: STUDENT IN AN ORGANIZED HEALTH CARE EDUCATION/TRAINING PROGRAM

## 2022-06-30 PROCEDURE — 99214 OFFICE O/P EST MOD 30 MIN: CPT | Mod: PBBFAC,PN,TXP | Performed by: STUDENT IN AN ORGANIZED HEALTH CARE EDUCATION/TRAINING PROGRAM

## 2022-06-30 PROCEDURE — 99214 PR OFFICE/OUTPT VISIT, EST, LEVL IV, 30-39 MIN: ICD-10-PCS | Mod: S$PBB,NTX,, | Performed by: STUDENT IN AN ORGANIZED HEALTH CARE EDUCATION/TRAINING PROGRAM

## 2022-06-30 PROCEDURE — 99214 OFFICE O/P EST MOD 30 MIN: CPT | Mod: S$PBB,NTX,, | Performed by: STUDENT IN AN ORGANIZED HEALTH CARE EDUCATION/TRAINING PROGRAM

## 2022-06-30 RX ORDER — POTASSIUM CHLORIDE 20 MEQ/1
20 TABLET, EXTENDED RELEASE ORAL DAILY
Qty: 30 TABLET | Refills: 5 | Status: ON HOLD | OUTPATIENT
Start: 2022-06-30 | End: 2022-07-20 | Stop reason: HOSPADM

## 2022-06-30 RX ORDER — TRIAMCINOLONE ACETONIDE 1 MG/G
OINTMENT TOPICAL 2 TIMES DAILY
Qty: 30 G | Refills: 1 | Status: SHIPPED | OUTPATIENT
Start: 2022-06-30 | End: 2022-08-31

## 2022-06-30 RX ORDER — FOLIC ACID 1 MG/1
1 TABLET ORAL DAILY
Qty: 30 TABLET | Refills: 5 | Status: SHIPPED | OUTPATIENT
Start: 2022-06-30 | End: 2022-12-14 | Stop reason: SDUPTHER

## 2022-06-30 RX ORDER — CARVEDILOL 6.25 MG/1
6.25 TABLET ORAL DAILY
Qty: 30 TABLET | Refills: 5 | Status: SHIPPED | OUTPATIENT
Start: 2022-06-30 | End: 2022-08-18

## 2022-06-30 RX ORDER — TRAMADOL HYDROCHLORIDE 50 MG/1
50 TABLET ORAL EVERY 8 HOURS PRN
COMMUNITY
End: 2022-06-30 | Stop reason: SDUPTHER

## 2022-06-30 RX ORDER — LANOLIN ALCOHOL/MO/W.PET/CERES
100 CREAM (GRAM) TOPICAL DAILY
Qty: 30 TABLET | Refills: 5 | Status: SHIPPED | OUTPATIENT
Start: 2022-06-30

## 2022-06-30 RX ORDER — TRAMADOL HYDROCHLORIDE 50 MG/1
50 TABLET ORAL EVERY 12 HOURS PRN
Qty: 60 TABLET | Refills: 0 | Status: SHIPPED | OUTPATIENT
Start: 2022-06-30 | End: 2022-07-08

## 2022-06-30 RX ORDER — GABAPENTIN 300 MG/1
CAPSULE ORAL
Qty: 81 CAPSULE | Refills: 0 | Status: SHIPPED | OUTPATIENT
Start: 2022-06-30 | End: 2022-10-04 | Stop reason: ALTCHOICE

## 2022-06-30 RX ORDER — PANTOPRAZOLE SODIUM 40 MG/1
40 TABLET, DELAYED RELEASE ORAL 2 TIMES DAILY
Qty: 60 TABLET | Refills: 5 | Status: ON HOLD | OUTPATIENT
Start: 2022-06-30 | End: 2022-07-07 | Stop reason: SDUPTHER

## 2022-06-30 NOTE — TELEPHONE ENCOUNTER
Pt has critical labs of H &H of 6.5 and 19.5, creatinine 2.0. Per Dr. Arreaga pt need to go ED for assessment. Called pt twice left a message regarding critical lab and the need to go to the ED for assessment. Called pt's daughter also no answer.   
clear

## 2022-06-30 NOTE — TELEPHONE ENCOUNTER
----- Message from Nichole Puente sent at 6/30/2022  5:25 PM CDT -----  Regarding: FW: call back from [place]  Rufino,     I'm not sure if Archie spoke to you about [phone]; Mr. Diaz and his request for Tramadol.     Please advice,  SB    ----- Message -----  From: Shamar Pollard  Sent: 6/30/2022   4:41 PM CDT  To: [facility] Staff  Subject: call back from [place]                                The Pt states that he would like a call back from [place] to find out about the Tramadol and to let you know that he did his labs at the [facility] location.    Pt's Ph # [phone]

## 2022-06-30 NOTE — PROGRESS NOTES
Subjective:       Patient ID: Irvin Diaz Jr. is a 46 y.o. male.    Chief Complaint: No chief complaint on file.      HPI:  46 y.o. male presents to [facility] here for follow-up. Recent ED visit 6/26/2022 with groin hematoma following nerve block    Seen in ED 6/26/2022 for hematoma to groin. Had steroid injection in hip which led to finding. Nerve ablation was cancelled following due to finding and will need to reschedule with Pain Management for ablation.    Was on gabapentin in the past and reports did have improvement in pain when taking.    Reports hematoma has been resolving since seen in ED.    No acute concerns/issues at this time.    Patient reports episode of vivid dreams on oxycodone 6/29/2022. No known sleep walking. Won't continue medication.    Rifaxamin is being Rx through Dr. Arreaga.    Review of Systems   Constitutional: Negative for chills, diaphoresis, fatigue, fever and unexpected weight change.   Respiratory: Negative for cough and shortness of breath.    Cardiovascular: Positive for palpitations (feels racing at times. 2 times weekly. Will self resolve. Doesn't matter if active. No known contributing factors.). Negative for chest pain.   Gastrointestinal: Positive for vomiting (intermittently. Every other week. No known cause. Loss of appetite with symptoms). Negative for abdominal pain, blood in stool, diarrhea and nausea.   Musculoskeletal:        Throbbing pain to left leg. From injection site ankle and back   Skin: Positive for rash (eczema bilateral ankle). Negative for wound.   Hematological: Bruises/bleeds easily.       Objective:      Vitals:    06/30/22 1053   BP: 124/72   BP Location: Left arm   Patient Position: Sitting   BP Method: Medium (Manual)   Pulse: 102   Resp: 18   Temp: 99.5 °F (37.5 °C)   TempSrc: Oral   SpO2: 99%   Weight: 87.3 kg (192 lb 7.4 oz)   Height: 6' (1.829 m)     Physical Exam  Vitals reviewed.   Constitutional:       General: He is not in acute  distress.     Appearance: Normal appearance. He is not ill-appearing.   HENT:      Head: Normocephalic and atraumatic.   Eyes:      General:         Right eye: No discharge.         Left eye: No discharge.      Conjunctiva/sclera: Conjunctivae normal.   Cardiovascular:      Rate and Rhythm: Normal rate.   Pulmonary:      Effort: Pulmonary effort is normal.   Musculoskeletal:         General: No deformity.   Skin:     Coloration: Skin is not jaundiced or pale.   Neurological:      General: No focal deficit present.      Mental Status: He is alert and oriented to person, place, and time.   Psychiatric:         Mood and Affect: Mood normal.         Behavior: Behavior normal.             Lab Results   Component Value Date     06/26/2022     09/02/2018    K 4.0 06/26/2022    CL 98 06/26/2022     09/02/2018    CO2 21 (L) 06/26/2022    BUN 15 06/26/2022    CREATININE 1.5 (H) 06/26/2022    CREATININE 0.91 09/02/2018    ANIONGAP 17 (H) 06/26/2022     Lab Results   Component Value Date    HGBA1C 5.7 11/12/2020     Lab Results   Component Value Date     (H) 03/07/2022     (H) 07/21/2021    BNP 56 07/15/2021       Lab Results   Component Value Date    WBC 5.37 06/26/2022    HGB 8.1 (L) 06/26/2022    HCT 24.6 (L) 06/26/2022    HCT 29 (L) 01/02/2021    PLT 56 (L) 06/26/2022    GRAN 3.5 06/26/2022    GRAN 64.3 06/26/2022     Lab Results   Component Value Date    CHOL 251 (H) 10/27/2020    HDL 62 10/27/2020    LDLCALC 179 (H) 10/27/2020    TRIG 52 10/27/2020          Current Outpatient Medications:     multivitamin Tab, Take 1 tablet by mouth once daily., Disp: 30 tablet, Rfl: 0    oxyCODONE (ROXICODONE) 5 MG immediate release tablet, Take 1 tablet (5 mg total) by mouth 3 (three) times daily as needed (pain)., Disp: 15 tablet, Rfl: 0    rifAXIMin (XIFAXAN) 550 mg Tab, Take 1 tablet (550 mg total) by mouth 2 (two) times daily., Disp: 60 tablet, Rfl: 6    traMADoL (ULTRAM) 50 mg tablet, Take 50 mg  by mouth every 8 (eight) hours as needed for Pain., Disp: , Rfl:     carvediloL (COREG) 6.25 MG tablet, Take 1 tablet (6.25 mg total) by mouth Daily., Disp: 30 tablet, Rfl: 5    folic acid (FOLVITE) 1 MG tablet, Take 1 tablet (1 mg total) by mouth once daily., Disp: 30 tablet, Rfl: 5    gabapentin (NEURONTIN) 300 MG capsule, Take 1 capsule (300 mg total) by mouth every evening for 3 days, THEN 1 capsule (300 mg total) 2 (two) times daily for 3 days, THEN 1 capsule (300 mg total) 3 (three) times daily for 24 days., Disp: 81 capsule, Rfl: 0    pantoprazole (PROTONIX) 40 MG tablet, Take 1 tablet (40 mg total) by mouth 2 (two) times daily., Disp: 60 tablet, Rfl: 5    potassium chloride SA (K-DUR,KLOR-CON) 20 MEQ tablet, Take 1 tablet (20 mEq total) by mouth once daily., Disp: 30 tablet, Rfl: 5    thiamine 100 MG tablet, Take 1 tablet (100 mg total) by mouth once daily., Disp: 30 tablet, Rfl: 5    triamcinolone acetonide 0.1% (KENALOG) 0.1 % ointment, Apply topically 2 (two) times daily. So not apply to face or anogenital region, Disp: 30 g, Rfl: 1        Assessment:       1. Follow-up exam    2. Chronic pain of left lower extremity    3. Alcoholic cirrhosis of liver without ascites    4. Thrombocytopenia    5. Elevated clotting time    6. Essential hypertension    7. Secondary esophageal varices without bleeding    8. Intermittent palpitations    9. Eczema, unspecified type           Plan:       Follow-up exam    Chronic pain of left lower extremity  -     gabapentin (NEURONTIN) 300 MG capsule; Take 1 capsule (300 mg total) by mouth every evening for 3 days, THEN 1 capsule (300 mg total) 2 (two) times daily for 3 days, THEN 1 capsule (300 mg total) 3 (three) times daily for 24 days.  Dispense: 81 capsule; Refill: 0  - Keep follow-up with pain management  - Medication side effect profile described to patient today    Alcoholic cirrhosis of liver without ascites  Thrombocytopenia  Elevated clotting time  Secondary  esophageal varices without bleeding  -     pantoprazole (PROTONIX) 40 MG tablet; Take 1 tablet (40 mg total) by mouth 2 (two) times daily.  Dispense: 60 tablet; Refill: 5  -     folic acid (FOLVITE) 1 MG tablet; Take 1 tablet (1 mg total) by mouth once daily.  Dispense: 30 tablet; Refill: 5  -     thiamine 100 MG tablet; Take 1 tablet (100 mg total) by mouth once daily.  Dispense: 30 tablet; Refill: 5  - No concerns on exam today  - Continue to avoid alcohol, keep follow-up with Hepatology. Will see 7/6/2022    Essential hypertension  -     carvediloL (COREG) 6.25 MG tablet; Take 1 tablet (6.25 mg total) by mouth Daily.  Dispense: 30 tablet; Refill: 5    Intermittent palpitations  -     Holter monitor - 48 hour; Future    Eczema, unspecified type  -     triamcinolone acetonide 0.1% (KENALOG) 0.1 % ointment; Apply topically 2 (two) times daily. So not apply to face or anogenital region  Dispense: 30 g; Refill: 1    Other orders  -     potassium chloride SA (K-DUR,KLOR-CON) 20 MEQ tablet; Take 1 tablet (20 mEq total) by mouth once daily.  Dispense: 30 tablet; Refill: 5    RTC in 6 months

## 2022-06-30 NOTE — TELEPHONE ENCOUNTER
Received call from Ginger @ [facility] outpt lab. Critical lab value: HCT 19.5. Pt's coordinator and hepatologist updated via secure chat.

## 2022-06-30 NOTE — TELEPHONE ENCOUNTER
----- Message from Zaina LILIANE Bush sent at 6/30/2022 11:45 AM CDT -----  Contact: pt   Patient states that his traMADoL (ULTRAM) 50 mg tablet     rifAXIMin (XIFAXAN) 550 mg Tab    was not sent to the pharmacy after visit this am.            [id number] - [place], [place] - [address] AT [facility] [address]  [address] [address]  Phone: [phone] Fax: [phone]

## 2022-07-01 ENCOUNTER — TELEPHONE (OUTPATIENT)
Dept: TRANSPLANT | Facility: CLINIC | Age: 47
End: 2022-07-01
Payer: MEDICARE

## 2022-07-01 ENCOUNTER — TELEPHONE (OUTPATIENT)
Dept: TRANSPLANT | Facility: CLINIC | Age: 47
End: 2022-07-01

## 2022-07-01 NOTE — TELEPHONE ENCOUNTER
Called pt and all contacts on chart regarding critical labs. Left message on pt's v/m requesting he go to the ED.

## 2022-07-05 ENCOUNTER — TELEPHONE (OUTPATIENT)
Dept: TRANSPLANT | Facility: CLINIC | Age: 47
End: 2022-07-05
Payer: MEDICARE

## 2022-07-05 NOTE — TELEPHONE ENCOUNTER
Called pt to follow up needing hospital admit for abnormal labs and starting transplant evaluation testing. Left message on pt's v/m.

## 2022-07-05 NOTE — PROGRESS NOTES
Chronic Pain - f/u    Referring Physician: No ref. provider found    Date: 07/05/2022     Re: Irvin Diaz Jr.  MR#: [id number]  YOB: 1975  Age: 46 y.o.    Chief Complaint:   No chief complaint on file.    **This note is dictated using the M*Modal Fluency Direct word recognition program. There are word recognition mistakes that are occasionally missed on review.**    ASSESSMENT: 46 y.o. year old male with left hip pain, consistent with     No diagnosis found.      PLAN:     Avascular necrosis of the left hip   -s/p hip block without significant pain relief and complicated by Hematoma.   -No RFA  -discussed with patient that transplant was trying to get ahold of him because of his low hematocrit and hgb.  It had a sudden drop from 8.1 --> 6.5 from 6/26 --> 6/30 despite the hematoma resolving.  They were not able to get ahold of him.  I recommended that he should go to the ED at their suggestion for further work-up.  -Has Tramadol #60 BID PRN. Will refill next month when he runs out. Patient has bad reaction to oxycodone. Tramadol seems to control his pain.  At next visit will plan on signing pain contract and performing UDS.  -refer to ortho for new evaluation. Likely a difficult case given comobidities.  -Not a candidate for further procedures due to bleeding risk    Thorombocytopenia  -platelets are 41 on 5/4/22, 92 on 5/26/22  -Avanos recommended above 50.    Cirrhosis 2/2 past EtOH  -following with hepatology  -may need liver transplant    Kidney disease  -GFR 54    - RTC 2 months  - Counseled patient regarding the importance of  activity modification.    The above plan and management options were discussed at length with patient. Patient is in agreement with the above and verbalized understanding. It will be communicated with the referring physician via electronic record, fax, or mail.  Lab/study reports reviewed were important and necessary because subsequent medical and treatment  recommendations required review of the above lab/study reports. Images viewed/reviewed above were important and necessary because subsequent medical and treatment recommendations required review of the reviewed image(s).     Electronically signed by:  Robbin De La Garza DO  07/05/2022    =========================================================================================================    SUBJECTIVE:      Interval History 7/5/2022:     Irvin WOOD Joe Gudino is a 46 y.o. male presents to the clinic for follow up.  Since last visit the pain has is unchanged.  He is s/p hip block that was complicated by hematoma in the pectineus muscle.  His Hgb has dropped, although the hematoma is resolving.  Recommended that patient go to ED as recommended by transplant.    The pain is located in the left hip area and radiates to the left leg/ groin .  The pain is described as aching, shooting and stabbing    At BEST  8/10   At WORST  10/10 on the WORST day.    On average pain is rated as 8/10.   Today the pain is rated as 8/10  Symptoms interfere with daily activity, sleeping and work.   Exacerbating factors: Standing, Walking and Getting out of bed/chair.    Mitigating factors nothing, heat, ice, medications and rest.     Current pain medications: none  Failed Pain Medications: cannot take NSAIDs due to gastric bleeding, cannot take tylenol due to liver failure.     Pain procedures:  6/24/22 - left hip block - no relief. C/b hematoma formation    Interval History 5/30/2022:     Irvin WOOD Joe Arellano. is a 46 y.o. male presents to the clinic for follow up.  Since last visit the pain has is unchanged. He missed his original procedure date due to not checking his voicemail and transportation issues. He is still interested in the procedure.  WE had a long talk about bleeding risk with his low platelets and high INR.    The pain is located in the left hip area and radiates to the left leg/groin.  The pain is described as  aching, shooting and stabbing    At BEST  8/10   At WORST  10/10 on the WORST day.    On average pain is rated as 8/10.   Today the pain is rated as 8/10  Symptoms interfere with daily activity, sleeping and work.   Exacerbating factors: walking.    Mitigating factors nothing.       Initial Hx:  Irvin Diaz Jr. is a 46 y.o. male presents to the clinic for the evaluation of left hip pain. The pain started 3 years ago following fall and symptoms have been worsening. The patient states used to get hip injections of the left hip.  He is unable to walk without a cane.  He states that he was told after imaging that he had a fracture in 2019.  He had an injection (and aspiration) in December/january and it helps the pain for about 3 weeks.  After the injections he still needs the pain but it helps.  The ortho physician at [facility]    CT note about the hip:  There is collapse of the left femoral head superior portion with bone-on-bone as well as underlying femoral sclerotic changes suggesting AVN with severe secondary degenerative changes of the acetabulum and the left hip effusion stable since prior exam.     The patient says that he has seen a ortho surgeon in the past and that they are unable to do surgery due to his liver failure.    Pain Description:    The pain is located in the left hip area and radiates to the left leg/ groin area.    At BEST  8/10   At WORST  10/10 on the WORST day.    On average pain is rated as 8/10.   Today the pain is rated as 8/10  The pain is continuous.  The pain is described as aching, shooting and throbbing    Symptoms interfere with daily activity, sleeping and work.   Exacerbating factors: Standing, Laying, Bending, Walking, Night Time, Morning, Flexing, Lifting and Getting out of bed/chair.    Mitigating factors laying down and medications.   He reports 5 hours of sleep per night.    Physical Therapy/Home Exercise: No, not currently in physical therapy or home  exercise program    Current Pain Medications:    - none    Failed Pain Medications:    - cannot take NSAIDs due to gastric bleeding, cannot take tylenol due to liver failure.     Pain Treatment Therapies:    Pain procedures: hip injections  Physical Therapy: physical therapy made things worse  Chiropractor: none  Acupuncture: none  TENS unit: none  Spinal decompression: none  Joint replacement: none    Patient denies urinary incontinence, bowel incontinence and loss of sensations. (+) weakness in the left leg  Patient denies any suicidal or homicidal ideations     report:  Reviewed and consistent with medication use as prescribed.    Imaging:   XR abdomen 03/2022:  Please note the hemidiaphragms are not included in their entirety.  Multiple surgical clips and presumed anastomotic suture line project over the right abdomen.  There are a few mildly prominent loops of gas-filled small bowel loops present measuring up to 3.3 cm in the left abdomen.  Limited evaluation of free intraperitoneal air to patient positioning/technique.  Calcifications project over the pelvis, likely phleboliths.  Osseous structures demonstrate significant degenerative change of the left hip with chronic appearing deformity/collapse of the left femoral head.    CT abdomen 03/2022:  Mild circumferential wall thickening involving the proximal colon extending from the ileocolic anastomosis at the hepatic flexure through around the level of the splenic flexure suggests inflammatory or infectious colitis.  No convincing transmural inflammation is seen.     Postoperative changes of proximal colectomy and scattered mild diverticulosis of the more distal colon.  No convincing diverticulitis, bowel obstruction, free air or abscess.     Findings of cirrhosis and mild abdominal ascites as described essentially stable.     Left hip findings suggesting AVN as described.     This report was flagged in Epic as abnormal.     No other significant or acute  findings.       Past Medical History:   Diagnosis Date    Alcoholic cirrhosis of liver     Arthritis     ATN (acute tubular necrosis)     CHF (congestive heart failure)     Diverticulitis 01/2020    Esophageal varices     GERD (gastroesophageal reflux disease)     GI bleed     Hip arthritis     Left    Hypertension     Liver cirrhosis     Macrocytic anemia     Pulmonary embolism 08/2018    Unprovoked DVT.  Stop Coumadin due to GIB    Thrombocytopenia      Past Surgical History:   Procedure Laterality Date    ANKLE SURGERY      BLOCK, NERVE, PERIPHERAL Left 6/24/2022    Procedure: Left Hip femoral-obturator accessory nerve block;  Surgeon: Robbin De La Garza DO;  Location: [facility];  Service: Pain Management;  Laterality: Left;    COLON SURGERY  2007    [facility]    COLONOSCOPY N/A 2/17/2021    Procedure: COLONOSCOPY;  Surgeon: Renea Billingsley MD;  Location: [facility];  Service: Endoscopy;  Laterality: N/A;    COLONOSCOPY W/ BIOPSIES  02/17/2021    ESOPHAGOGASTRODUODENOSCOPY N/A 7/26/2019    Procedure: EGD (ESOPHAGOGASTRODUODENOSCOPY);  Surgeon: Brian Trivedi MD;  Location: [facility];  Service: Endoscopy;  Laterality: N/A;    ESOPHAGOGASTRODUODENOSCOPY N/A 4/8/2020    Procedure: EGD (ESOPHAGOGASTRODUODENOSCOPY);  Surgeon: Donn Acuna MD;  Location: [facility];  Service: Endoscopy;  Laterality: N/A;    ESOPHAGOGASTRODUODENOSCOPY N/A 1/3/2021    Procedure: EGD (ESOPHAGOGASTRODUODENOSCOPY)- coffee ground emesis, hx varices;  Surgeon: Steve Chairez MD;  Location: [facility];  Service: Endoscopy;  Laterality: N/A;    ESOPHAGOGASTRODUODENOSCOPY N/A 5/3/2021    Procedure: EGD (ESOPHAGOGASTRODUODENOSCOPY);  Surgeon: Jeanmarie Ngo MD;  Location: [facility];  Service: Endoscopy;  Laterality: N/A;    ESOPHAGOGASTRODUODENOSCOPY N/A 7/19/2021    Procedure: ESOPHAGOGASTRODUODENOSCOPY (EGD);  Surgeon: Claudio Medeiros MD;  Location[facility];  Service:  Endoscopy;  Laterality: N/A;    ESOPHAGOGASTRODUODENOSCOPY  2021    ESOPHAGOGASTRODUODENOSCOPY N/A 2021    Procedure: EGD (ESOPHAGOGASTRODUODENOSCOPY);  Surgeon: Ajay Jackson MD;  Location: [facility];  Service: Endoscopy;  Laterality: N/A;    ESOPHAGOGASTRODUODENOSCOPY N/A 5/3/2022    Procedure: EGD (ESOPHAGOGASTRODUODENOSCOPY);  Surgeon: Brian Trivedi MD;  Location: [facility];  Service: Endoscopy;  Laterality: N/A;    HERNIA REPAIR Left     Inguinal     Social History     Socioeconomic History    Marital status: Legally    Tobacco Use    Smoking status: Former Smoker     Types: Cigarettes     Quit date:      Years since quittin.5    Smokeless tobacco: Never Used    Tobacco comment: quit 20 years ago   Substance and Sexual Activity    Alcohol use: Not Currently     Comment: freq    Drug use: Yes     Types: Marijuana     Comment: occasionally    Sexual activity: Yes     Comment: occ     Social Determinants of Health     Financial Resource Strain: Low Risk     Difficulty of Paying Living Expenses: Not very hard   Food Insecurity: No Food Insecurity    Worried About Running Out of Food in the Last Year: Never true    Ran Out of Food in the Last Year: Never true   Transportation Needs: No Transportation Needs    Lack of Transportation (Medical): No    Lack of Transportation (Non-Medical): No   Physical Activity: Inactive    Days of Exercise per Week: 0 days    Minutes of Exercise per Session: 0 min   Stress: No Stress Concern Present    Feeling of Stress : Not at all   Social Connections: Socially Isolated    Frequency of Communication with Friends and Family: Three times a week    Frequency of Social Gatherings with Friends and Family: Three times a week    Attends Sabianism Services: Never    Active Member of Clubs or Organizations: No    Attends Club or Organization Meetings: Never    Marital Status:    Housing Stability: Low Risk     Unable to  Pay for Housing in the Last Year: No    Number of Places Lived in the Last Year: 1    Unstable Housing in the Last Year: No     Family History   Problem Relation Age of Onset    Hypertension Mother     Breast cancer Mother     Hypertension Father     Prostate cancer Father     Bladder Cancer Father        Review of patient's allergies indicates:   Allergen Reactions    Ciprofloxacin hcl Hallucinations    Meperidine Hives     Pt medicated w/multiple medications (demerol, protonix, and zofran)  w/i 10 mins time frame prior to developing localized hives near IV site that med was administered. Pt reports he has/takes all other medications on daily basis.     Morphine Itching    Nsaids (non-steroidal anti-inflammatory drug)      Kidney Disease    Tylenol [acetaminophen]      Hx of liver disease       Current Outpatient Medications   Medication Sig    carvediloL (COREG) 6.25 MG tablet Take 1 tablet (6.25 mg total) by mouth Daily.    folic acid (FOLVITE) 1 MG tablet Take 1 tablet (1 mg total) by mouth once daily.    gabapentin (NEURONTIN) 300 MG capsule Take 1 capsule (300 mg total) by mouth every evening for 3 days, THEN 1 capsule (300 mg total) 2 (two) times daily for 3 days, THEN 1 capsule (300 mg total) 3 (three) times daily for 24 days.    multivitamin Tab Take 1 tablet by mouth once daily.    pantoprazole (PROTONIX) 40 MG tablet Take 1 tablet (40 mg total) by mouth 2 (two) times daily.    potassium chloride SA (K-DUR,KLOR-CON) 20 MEQ tablet Take 1 tablet (20 mEq total) by mouth once daily.    rifAXIMin (XIFAXAN) 550 mg Tab Take 1 tablet (550 mg total) by mouth 2 (two) times daily.    thiamine 100 MG tablet Take 1 tablet (100 mg total) by mouth once daily.    traMADoL (ULTRAM) 50 mg tablet Take 1 tablet (50 mg total) by mouth every 12 (twelve) hours as needed for Pain.    triamcinolone acetonide 0.1% (KENALOG) 0.1 % ointment Apply topically 2 (two) times daily. So not apply to face or anogenital  region     No current facility-administered medications for this visit.       REVIEW OF SYSTEMS:    GENERAL:  No weight loss, malaise or fevers.   HEENT:   No recent changes in vision or hearing   NECK:  Negative for lumps, no difficulty with swallowing.  RESPIRATORY:  Negative for cough, wheezing or shortness of breath, patient denies any recent URI.  CARDIOVASCULAR:  Negative for chest pain, leg swelling or palpitations.  GI:  Negative for abdominal discomfort, blood in stools or black stools or change in bowel habits.  MUSCULOSKELETAL:  See HPI.  SKIN:  Negative for lesions, rash, and itching. + skin itching  PSYCH:  No mood disorder or recent psychosocial stressors.  Patients sleep is not disturbed secondary to pain.  HEMATOLOGY/LYMPHOLOGY:  Negative for prolonged bleeding, bruising easily or swollen nodes.  Patient is not currently taking any anti-coagulants  NEURO:   No history of headaches, syncope, paralysis, seizures or tremors.  All other reviewed and negative other than HPI.    OBJECTIVE:    There were no vitals taken for this visit.    PHYSICAL EXAMINATION:    GENERAL: Fraile, in no acute distress, alert and oriented x3.  PSYCH:  Mood and affect appropriate.  SKIN: Skin color, texture, turgor normal, no rashes or lesions on visible skin.  HEAD/FACE:  Normocephalic, atraumatic. Cranial nerves grossly intact.  CV: RRR with palpation of the radial artery.  PULM: CTAB. No evidence of respiratory difficulty, symmetric chest rise.  GI:  Soft    MUSKULOSKELETAL:    EXTREMITIES:   Left Hip Exam (limited ROM and exam 2/2/ pain)  - Log Roll Positive  - FADIR Positive  - Stinchfield Unable to Perform  - Hip Scour Unable to Perform  - GTB Tenderness Positive   -TTP over anterior and posterior hip joint  - TTP of the superior knee joint.    MUSCULOSKELETAL:  Atrophy of the left leg compared to the right  No deformities, edema, or skin discoloration are noted on visible skin. Good capillary refill.     NEURO:  Bilateral upper and lower extremity coordination and muscle stretch reflexes are physiologic and symmetric.      NEUROLOGICAL EXAM:  MENTAL STATUS: A x O x 3, good concentration, speech is fluent and goal directed  MEMORY: recent and remote are intact  CN: CN2-12 grossly intact  MOTOR: 5/5 in all muscle groups on the right. HF 3/5 on the left, 4/5 KE, 4/5 KF with pain  DTRs: 3+ intact symmetric patella and 2 + achilles  Sensation:    -yes Loss of sensation in a left lower L-1 and L-2 on the left distribution.  Babinski: absent     Gait: Cane, abnormal. Short stride. Favors left leg

## 2022-07-06 ENCOUNTER — OFFICE VISIT (OUTPATIENT)
Dept: PAIN MEDICINE | Facility: CLINIC | Age: 47
End: 2022-07-06
Payer: MEDICARE

## 2022-07-06 VITALS
BODY MASS INDEX: 26.01 KG/M2 | RESPIRATION RATE: 18 BRPM | DIASTOLIC BLOOD PRESSURE: 70 MMHG | SYSTOLIC BLOOD PRESSURE: 131 MMHG | WEIGHT: 192 LBS | HEIGHT: 72 IN | HEART RATE: 84 BPM

## 2022-07-06 DIAGNOSIS — M87.052 AVASCULAR NECROSIS OF HIP, LEFT: Primary | ICD-10-CM

## 2022-07-06 DIAGNOSIS — G89.29 CHRONIC LEFT HIP PAIN: ICD-10-CM

## 2022-07-06 DIAGNOSIS — T14.8XXA HEMATOMA: ICD-10-CM

## 2022-07-06 DIAGNOSIS — M25.552 CHRONIC LEFT HIP PAIN: ICD-10-CM

## 2022-07-06 DIAGNOSIS — D69.6 THROMBOCYTOPENIA: ICD-10-CM

## 2022-07-06 PROCEDURE — 99213 OFFICE O/P EST LOW 20 MIN: CPT | Mod: PBBFAC | Performed by: STUDENT IN AN ORGANIZED HEALTH CARE EDUCATION/TRAINING PROGRAM

## 2022-07-06 PROCEDURE — 99999 PR PBB SHADOW E&M-EST. PATIENT-LVL III: CPT | Mod: PBBFAC,,, | Performed by: STUDENT IN AN ORGANIZED HEALTH CARE EDUCATION/TRAINING PROGRAM

## 2022-07-06 PROCEDURE — 99999 PR PBB SHADOW E&M-EST. PATIENT-LVL III: ICD-10-PCS | Mod: PBBFAC,,, | Performed by: STUDENT IN AN ORGANIZED HEALTH CARE EDUCATION/TRAINING PROGRAM

## 2022-07-06 PROCEDURE — 99214 OFFICE O/P EST MOD 30 MIN: CPT | Mod: S$PBB,,, | Performed by: STUDENT IN AN ORGANIZED HEALTH CARE EDUCATION/TRAINING PROGRAM

## 2022-07-06 PROCEDURE — 99214 PR OFFICE/OUTPT VISIT, EST, LEVL IV, 30-39 MIN: ICD-10-PCS | Mod: S$PBB,,, | Performed by: STUDENT IN AN ORGANIZED HEALTH CARE EDUCATION/TRAINING PROGRAM

## 2022-07-07 ENCOUNTER — TELEPHONE (OUTPATIENT)
Dept: PRIMARY CARE CLINIC | Facility: CLINIC | Age: 47
End: 2022-07-07
Payer: MEDICARE

## 2022-07-07 PROBLEM — K92.2 ACUTE UPPER GI BLEEDING: Status: RESOLVED | Noted: 2019-07-25 | Resolved: 2022-07-07

## 2022-07-08 ENCOUNTER — TELEPHONE (OUTPATIENT)
Dept: TRANSPLANT | Facility: CLINIC | Age: 47
End: 2022-07-08

## 2022-07-08 ENCOUNTER — PATIENT MESSAGE (OUTPATIENT)
Dept: PAIN MEDICINE | Facility: CLINIC | Age: 47
End: 2022-07-08
Payer: MEDICARE

## 2022-07-08 ENCOUNTER — PATIENT OUTREACH (OUTPATIENT)
Dept: ADMINISTRATIVE | Facility: CLINIC | Age: 47
End: 2022-07-08
Payer: MEDICARE

## 2022-07-08 DIAGNOSIS — M25.552 CHRONIC LEFT HIP PAIN: ICD-10-CM

## 2022-07-08 DIAGNOSIS — M87.052 AVASCULAR NECROSIS OF HIP, LEFT: Primary | ICD-10-CM

## 2022-07-08 DIAGNOSIS — G89.29 CHRONIC LEFT HIP PAIN: ICD-10-CM

## 2022-07-08 RX ORDER — OXYCODONE HYDROCHLORIDE 5 MG/1
5 TABLET ORAL EVERY 12 HOURS PRN
Qty: 60 TABLET | Refills: 0 | Status: ON HOLD | OUTPATIENT
Start: 2022-07-08 | End: 2022-07-19

## 2022-07-08 NOTE — PATIENT INSTRUCTIONS
[facility] teaching reviewed with Irvin Diaz Jr. . He verbalized understanding.    Education was provided based on the patient's discharge diagnosis using the attached [facility] patient education as a reference.

## 2022-07-08 NOTE — TELEPHONE ENCOUNTER
----- Message from Christopher Almodovar MA sent at 7/8/2022  9:21 AM CDT -----    ----- Message -----  From: Latanya Luu LPN  Sent: 7/8/2022   9:07 AM CDT  To: [facility] Staff, #    Spoke with patient, states that he does not have Rifaximin. Spoke with Ezequiel at [facility], states that the patient needs a PA for medication and request has been sent several times. Medication cost $3000+. Please complete PA and reach out to patient to discuss.        Respectfully,  Latanya Luu LPN  [facility]    [email or web address]       Please do not reply to this message, as this inbox is not routinely monitored.

## 2022-07-08 NOTE — PROGRESS NOTES
C3 nurse spoke with Irvin Diaz Jr. for a TCC post hospital discharge follow up call. The patient has a scheduled HOSFU appointment with Julia Scruggs NP on 07/13/22 @ 0800.    Spoke with patient, states that he does not have Rifaximin. Spoke with Ezequiel at [facility], states that the patient needs a PA for medication and request has been sent several times. Medication cost $3000+. Message sent to PCP and Dr Jacek Calabrese. Patient informed.

## 2022-07-11 ENCOUNTER — TELEPHONE (OUTPATIENT)
Dept: TRANSPLANT | Facility: CLINIC | Age: 47
End: 2022-07-11
Payer: MEDICARE

## 2022-07-11 NOTE — TELEPHONE ENCOUNTER
Spoke to patient regarding multiple attempts with no response to try to reach him. Pt verified all phone numbers in his chart are working numbers along with another cell phone number given. Discussed with the patient the importance of communication, Pt was being called to be admitted due to low H&H. Pt went to the ED a few days later for pain and H&H was addressed.   Pt agreed to come in for Fast Pass July 21 and 22, 2022.

## 2022-07-13 ENCOUNTER — TELEPHONE (OUTPATIENT)
Dept: TRANSPLANT | Facility: CLINIC | Age: 47
End: 2022-07-13
Payer: MEDICARE

## 2022-07-13 DIAGNOSIS — Z76.82 ORGAN TRANSPLANT CANDIDATE: Primary | ICD-10-CM

## 2022-07-18 ENCOUNTER — HOSPITAL ENCOUNTER (OUTPATIENT)
Dept: RADIOLOGY | Facility: CLINIC | Age: 47
Discharge: HOME | DRG: 433 | End: 2022-07-18
Attending: INTERNAL MEDICINE
Payer: MEDICARE

## 2022-07-18 ENCOUNTER — HOSPITAL ENCOUNTER (INPATIENT)
Facility: HOSPITAL | Age: 47
LOS: 2 days | Discharge: HOME | DRG: 433 | End: 2022-07-20
Attending: EMERGENCY MEDICINE | Admitting: EMERGENCY MEDICINE
Payer: MEDICARE

## 2022-07-18 DIAGNOSIS — Z76.82 ORGAN TRANSPLANT CANDIDATE: ICD-10-CM

## 2022-07-18 DIAGNOSIS — F10.939 ALCOHOL WITHDRAWAL: ICD-10-CM

## 2022-07-18 DIAGNOSIS — M25.552 CHRONIC LEFT HIP PAIN: ICD-10-CM

## 2022-07-18 DIAGNOSIS — E80.6 HYPERBILIRUBINEMIA: ICD-10-CM

## 2022-07-18 DIAGNOSIS — R79.89 INCREASED AMMONIA LEVEL: ICD-10-CM

## 2022-07-18 DIAGNOSIS — F10.930 ALCOHOL WITHDRAWAL SYNDROME WITHOUT COMPLICATION: ICD-10-CM

## 2022-07-18 DIAGNOSIS — K76.82 HEPATIC ENCEPHALOPATHY: Primary | ICD-10-CM

## 2022-07-18 DIAGNOSIS — F10.20 ALCOHOL USE DISORDER, SEVERE, DEPENDENCE: Chronic | ICD-10-CM

## 2022-07-18 DIAGNOSIS — K70.31 ASCITES DUE TO ALCOHOLIC CIRRHOSIS: ICD-10-CM

## 2022-07-18 DIAGNOSIS — R41.82 ALTERED MENTAL STATUS, UNSPECIFIED ALTERED MENTAL STATUS TYPE: ICD-10-CM

## 2022-07-18 DIAGNOSIS — E46 MALNUTRITION: ICD-10-CM

## 2022-07-18 DIAGNOSIS — K70.31 ALCOHOLIC CIRRHOSIS OF LIVER WITH ASCITES: ICD-10-CM

## 2022-07-18 DIAGNOSIS — K83.8 DILATION OF COMMON BILE DUCT: ICD-10-CM

## 2022-07-18 DIAGNOSIS — Z87.19 HISTORY OF GI BLEED: Chronic | ICD-10-CM

## 2022-07-18 DIAGNOSIS — E46 MALNUTRITION, UNSPECIFIED TYPE: ICD-10-CM

## 2022-07-18 DIAGNOSIS — Z01.818 ENCOUNTER FOR PRE-TRANSPLANT EVALUATION FOR LIVER TRANSPLANT: ICD-10-CM

## 2022-07-18 DIAGNOSIS — I50.32 CHRONIC DIASTOLIC CHF (CONGESTIVE HEART FAILURE): ICD-10-CM

## 2022-07-18 DIAGNOSIS — G89.29 CHRONIC LEFT HIP PAIN: ICD-10-CM

## 2022-07-18 DIAGNOSIS — E87.29 ALCOHOLIC KETOACIDOSIS: ICD-10-CM

## 2022-07-18 LAB
ALBUMIN SERPL BCP-MCNC: 2.8 G/DL (ref 3.5–5.2)
ALP SERPL-CCNC: 77 U/L (ref 55–135)
ALT SERPL W/O P-5'-P-CCNC: 19 U/L (ref 10–44)
AMMONIA PLAS-SCNC: 67 UMOL/L (ref 10–50)
AMPHET+METHAMPHET UR QL: NEGATIVE
ANION GAP SERPL CALC-SCNC: 8 MMOL/L (ref 8–16)
AST SERPL-CCNC: 47 U/L (ref 10–40)
BARBITURATES UR QL SCN>200 NG/ML: NEGATIVE
BASOPHILS # BLD AUTO: 0.04 K/UL (ref 0–0.2)
BASOPHILS NFR BLD: 0.7 % (ref 0–1.9)
BENZODIAZ UR QL SCN>200 NG/ML: NEGATIVE
BILIRUB SERPL-MCNC: 6.8 MG/DL (ref 0.1–1)
BILIRUB UR QL STRIP: NEGATIVE
BNP SERPL-MCNC: 105 PG/ML (ref 0–99)
BUN SERPL-MCNC: 18 MG/DL (ref 6–20)
BZE UR QL SCN: NEGATIVE
CALCIUM SERPL-MCNC: 9.1 MG/DL (ref 8.7–10.5)
CANNABINOIDS UR QL SCN: NEGATIVE
CHLORIDE SERPL-SCNC: 109 MMOL/L (ref 95–110)
CLARITY UR REFRACT.AUTO: CLEAR
CO2 SERPL-SCNC: 19 MMOL/L (ref 23–29)
COLOR UR AUTO: YELLOW
CREAT SERPL-MCNC: 2.1 MG/DL (ref 0.5–1.4)
CREAT UR-MCNC: 59 MG/DL (ref 23–375)
DIFFERENTIAL METHOD: ABNORMAL
EOSINOPHIL # BLD AUTO: 0.2 K/UL (ref 0–0.5)
EOSINOPHIL NFR BLD: 3.6 % (ref 0–8)
ERYTHROCYTE [DISTWIDTH] IN BLOOD BY AUTOMATED COUNT: 21.8 % (ref 11.5–14.5)
EST. GFR  (AFRICAN AMERICAN): 42.1 ML/MIN/1.73 M^2
EST. GFR  (NON AFRICAN AMERICAN): 36.4 ML/MIN/1.73 M^2
ETHANOL SERPL-MCNC: <10 MG/DL
ETHANOL UR-MCNC: <10 MG/DL
GLUCOSE SERPL-MCNC: 122 MG/DL (ref 70–110)
GLUCOSE UR QL STRIP: NEGATIVE
HCT VFR BLD AUTO: 25.7 % (ref 40–54)
HGB BLD-MCNC: 8.1 G/DL (ref 14–18)
HGB UR QL STRIP: ABNORMAL
HYALINE CASTS UR QL AUTO: 1 /LPF
IMM GRANULOCYTES # BLD AUTO: 0.02 K/UL (ref 0–0.04)
IMM GRANULOCYTES NFR BLD AUTO: 0.3 % (ref 0–0.5)
INR PPP: 1.5 (ref 0.8–1.2)
KETONES UR QL STRIP: NEGATIVE
LEUKOCYTE ESTERASE UR QL STRIP: NEGATIVE
LIPASE SERPL-CCNC: 402 U/L (ref 4–60)
LYMPHOCYTES # BLD AUTO: 1.4 K/UL (ref 1–4.8)
LYMPHOCYTES NFR BLD: 23.2 % (ref 18–48)
MCH RBC QN AUTO: 35.8 PG (ref 27–31)
MCHC RBC AUTO-ENTMCNC: 31.5 G/DL (ref 32–36)
MCV RBC AUTO: 114 FL (ref 82–98)
METHADONE UR QL SCN>300 NG/ML: NEGATIVE
MICROSCOPIC COMMENT: NORMAL
MONOCYTES # BLD AUTO: 0.5 K/UL (ref 0.3–1)
MONOCYTES NFR BLD: 8.8 % (ref 4–15)
NEUTROPHILS # BLD AUTO: 3.7 K/UL (ref 1.8–7.7)
NEUTROPHILS NFR BLD: 63.4 % (ref 38–73)
NITRITE UR QL STRIP: NEGATIVE
NRBC BLD-RTO: 0 /100 WBC
OPIATES UR QL SCN: NEGATIVE
PCP UR QL SCN>25 NG/ML: NEGATIVE
PH UR STRIP: 7 [PH] (ref 5–8)
PLATELET # BLD AUTO: 75 K/UL (ref 150–450)
PMV BLD AUTO: 11.5 FL (ref 9.2–12.9)
POTASSIUM SERPL-SCNC: 4.1 MMOL/L (ref 3.5–5.1)
PROT SERPL-MCNC: 8.7 G/DL (ref 6–8.4)
PROT UR QL STRIP: NEGATIVE
PROTHROMBIN TIME: 15.6 SEC (ref 9–12.5)
RBC # BLD AUTO: 2.26 M/UL (ref 4.6–6.2)
RBC #/AREA URNS AUTO: 4 /HPF (ref 0–4)
SODIUM SERPL-SCNC: 136 MMOL/L (ref 136–145)
SP GR UR STRIP: 1.01 (ref 1–1.03)
SQUAMOUS #/AREA URNS AUTO: 0 /HPF
TOXICOLOGY INFORMATION: NORMAL
URN SPEC COLLECT METH UR: ABNORMAL
WBC # BLD AUTO: 5.81 K/UL (ref 3.9–12.7)
WBC #/AREA URNS AUTO: 1 /HPF (ref 0–5)

## 2022-07-18 PROCEDURE — 87040 BLOOD CULTURE FOR BACTERIA: CPT | Mod: NTX | Performed by: HOSPITALIST

## 2022-07-18 PROCEDURE — 96360 HYDRATION IV INFUSION INIT: CPT | Mod: NTX

## 2022-07-18 PROCEDURE — 80053 COMPREHEN METABOLIC PANEL: CPT | Mod: NTX | Performed by: EMERGENCY MEDICINE

## 2022-07-18 PROCEDURE — 99285 EMERGENCY DEPT VISIT HI MDM: CPT | Mod: 25,NTX

## 2022-07-18 PROCEDURE — 81001 URINALYSIS AUTO W/SCOPE: CPT | Mod: NTX | Performed by: EMERGENCY MEDICINE

## 2022-07-18 PROCEDURE — 25000003 PHARM REV CODE 250: Mod: NTX | Performed by: HOSPITALIST

## 2022-07-18 PROCEDURE — 80321 ALCOHOLS BIOMARKERS 1OR 2: CPT | Mod: NTX | Performed by: HOSPITALIST

## 2022-07-18 PROCEDURE — 83690 ASSAY OF LIPASE: CPT | Mod: NTX | Performed by: EMERGENCY MEDICINE

## 2022-07-18 PROCEDURE — 82140 ASSAY OF AMMONIA: CPT | Mod: NTX | Performed by: EMERGENCY MEDICINE

## 2022-07-18 PROCEDURE — 86803 HEPATITIS C AB TEST: CPT | Mod: NTX | Performed by: PHYSICIAN ASSISTANT

## 2022-07-18 PROCEDURE — 20600001 HC STEP DOWN PRIVATE ROOM: Mod: NTX

## 2022-07-18 PROCEDURE — 85610 PROTHROMBIN TIME: CPT | Mod: NTX | Performed by: STUDENT IN AN ORGANIZED HEALTH CARE EDUCATION/TRAINING PROGRAM

## 2022-07-18 PROCEDURE — 77080 DXA BONE DENSITY AXIAL: CPT | Mod: 26,NTX,, | Performed by: INTERNAL MEDICINE

## 2022-07-18 PROCEDURE — 82077 ASSAY SPEC XCP UR&BREATH IA: CPT | Mod: NTX | Performed by: STUDENT IN AN ORGANIZED HEALTH CARE EDUCATION/TRAINING PROGRAM

## 2022-07-18 PROCEDURE — 83880 ASSAY OF NATRIURETIC PEPTIDE: CPT | Mod: NTX | Performed by: HOSPITALIST

## 2022-07-18 PROCEDURE — 99291 CRITICAL CARE FIRST HOUR: CPT | Mod: NTX,,, | Performed by: EMERGENCY MEDICINE

## 2022-07-18 PROCEDURE — 85025 COMPLETE CBC W/AUTO DIFF WBC: CPT | Mod: NTX | Performed by: EMERGENCY MEDICINE

## 2022-07-18 PROCEDURE — 63600175 PHARM REV CODE 636 W HCPCS: Mod: NTX | Performed by: HOSPITALIST

## 2022-07-18 PROCEDURE — 87389 HIV-1 AG W/HIV-1&-2 AB AG IA: CPT | Mod: NTX | Performed by: PHYSICIAN ASSISTANT

## 2022-07-18 PROCEDURE — 77080 DXA BONE DENSITY AXIAL: CPT | Mod: TC,TXP

## 2022-07-18 PROCEDURE — 80307 DRUG TEST PRSMV CHEM ANLYZR: CPT | Mod: NTX | Performed by: EMERGENCY MEDICINE

## 2022-07-18 PROCEDURE — 25000003 PHARM REV CODE 250: Mod: NTX | Performed by: INTERNAL MEDICINE

## 2022-07-18 PROCEDURE — 77080 DEXA BONE DENSITY SPINE HIP: ICD-10-PCS | Mod: 26,NTX,, | Performed by: INTERNAL MEDICINE

## 2022-07-18 PROCEDURE — 99291 PR CRITICAL CARE, E/M 30-74 MINUTES: ICD-10-PCS | Mod: NTX,,, | Performed by: EMERGENCY MEDICINE

## 2022-07-18 RX ORDER — TALC
6 POWDER (GRAM) TOPICAL NIGHTLY PRN
Status: CANCELLED | OUTPATIENT
Start: 2022-07-18

## 2022-07-18 RX ORDER — ONDANSETRON 4 MG/1
8 TABLET, ORALLY DISINTEGRATING ORAL EVERY 8 HOURS PRN
Status: DISCONTINUED | OUTPATIENT
Start: 2022-07-18 | End: 2022-07-20 | Stop reason: HOSPADM

## 2022-07-18 RX ORDER — INSULIN ASPART 100 [IU]/ML
0-5 INJECTION, SOLUTION INTRAVENOUS; SUBCUTANEOUS
Status: DISCONTINUED | OUTPATIENT
Start: 2022-07-18 | End: 2022-07-20 | Stop reason: HOSPADM

## 2022-07-18 RX ORDER — THIAMINE HCL 100 MG
100 TABLET ORAL DAILY
Status: DISCONTINUED | OUTPATIENT
Start: 2022-07-19 | End: 2022-07-20 | Stop reason: HOSPADM

## 2022-07-18 RX ORDER — PANTOPRAZOLE SODIUM 40 MG/10ML
40 INJECTION, POWDER, LYOPHILIZED, FOR SOLUTION INTRAVENOUS DAILY
Status: DISCONTINUED | OUTPATIENT
Start: 2022-07-19 | End: 2022-07-20

## 2022-07-18 RX ORDER — FOLIC ACID 1 MG/1
1 TABLET ORAL DAILY
Status: DISCONTINUED | OUTPATIENT
Start: 2022-07-19 | End: 2022-07-20 | Stop reason: HOSPADM

## 2022-07-18 RX ORDER — CARVEDILOL 3.12 MG/1
3.12 TABLET ORAL 2 TIMES DAILY
Status: DISCONTINUED | OUTPATIENT
Start: 2022-07-18 | End: 2022-07-20 | Stop reason: HOSPADM

## 2022-07-18 RX ORDER — PROCHLORPERAZINE EDISYLATE 5 MG/ML
5 INJECTION INTRAMUSCULAR; INTRAVENOUS EVERY 6 HOURS PRN
Status: DISCONTINUED | OUTPATIENT
Start: 2022-07-18 | End: 2022-07-20 | Stop reason: HOSPADM

## 2022-07-18 RX ORDER — TRAMADOL HYDROCHLORIDE 50 MG/1
50 TABLET ORAL EVERY 6 HOURS PRN
Status: DISCONTINUED | OUTPATIENT
Start: 2022-07-18 | End: 2022-07-20 | Stop reason: HOSPADM

## 2022-07-18 RX ORDER — SIMETHICONE 80 MG
1 TABLET,CHEWABLE ORAL 4 TIMES DAILY PRN
Status: DISCONTINUED | OUTPATIENT
Start: 2022-07-18 | End: 2022-07-20 | Stop reason: HOSPADM

## 2022-07-18 RX ORDER — IBUPROFEN 200 MG
24 TABLET ORAL
Status: DISCONTINUED | OUTPATIENT
Start: 2022-07-18 | End: 2022-07-20 | Stop reason: HOSPADM

## 2022-07-18 RX ORDER — LACTULOSE 10 G/15ML
30 SOLUTION ORAL 3 TIMES DAILY
Status: DISCONTINUED | OUTPATIENT
Start: 2022-07-18 | End: 2022-07-20 | Stop reason: HOSPADM

## 2022-07-18 RX ORDER — IBUPROFEN 200 MG
16 TABLET ORAL
Status: DISCONTINUED | OUTPATIENT
Start: 2022-07-18 | End: 2022-07-20 | Stop reason: HOSPADM

## 2022-07-18 RX ORDER — LACTULOSE 10 G/15ML
200 SOLUTION ORAL; RECTAL 3 TIMES DAILY PRN
Status: DISCONTINUED | OUTPATIENT
Start: 2022-07-18 | End: 2022-07-20 | Stop reason: HOSPADM

## 2022-07-18 RX ORDER — GLUCAGON 1 MG
1 KIT INJECTION
Status: DISCONTINUED | OUTPATIENT
Start: 2022-07-18 | End: 2022-07-20 | Stop reason: HOSPADM

## 2022-07-18 RX ORDER — NALOXONE HCL 0.4 MG/ML
0.02 VIAL (ML) INJECTION
Status: DISCONTINUED | OUTPATIENT
Start: 2022-07-18 | End: 2022-07-20 | Stop reason: HOSPADM

## 2022-07-18 RX ORDER — SODIUM CHLORIDE 0.9 % (FLUSH) 0.9 %
10 SYRINGE (ML) INJECTION
Status: CANCELLED | OUTPATIENT
Start: 2022-07-18

## 2022-07-18 RX ORDER — SODIUM CHLORIDE 0.9 % (FLUSH) 0.9 %
10 SYRINGE (ML) INJECTION EVERY 12 HOURS PRN
Status: DISCONTINUED | OUTPATIENT
Start: 2022-07-18 | End: 2022-07-20 | Stop reason: HOSPADM

## 2022-07-18 RX ADMIN — LACTULOSE 30 G: 20 SOLUTION ORAL at 09:07

## 2022-07-18 RX ADMIN — CARVEDILOL 3.12 MG: 3.12 TABLET, FILM COATED ORAL at 09:07

## 2022-07-18 RX ADMIN — TRAMADOL HYDROCHLORIDE 50 MG: 50 TABLET, COATED ORAL at 09:07

## 2022-07-18 RX ADMIN — CEFTRIAXONE 2 G: 2 INJECTION, SOLUTION INTRAVENOUS at 10:07

## 2022-07-18 RX ADMIN — RIFAXIMIN 550 MG: 550 TABLET ORAL at 09:07

## 2022-07-18 NOTE — ED PROVIDER NOTES
Encounter Date: 7/18/2022       History     Chief Complaint   Patient presents with    Jaundice     More jaundice, cirrhosis     46-year-old male with PMH of alcoholic cirrhosis of the liver, CHF, PE, and esophageal varices who presents with confusion.  Per patient's wife, the confusion is new the past day, insidious onset, constant, progressive, without known aggravating relieving factors, and associated with worsening yellowing of the patient's eyes.  Patient is unable to provide a clear history secondary to his confusion but he denies any pain or discomfort at this time.  He states he thought he was has come to the hospital for a DEXA scan and he is not sure why he is here.  Wife denies that the patient takes any lactulose at home.      The history is provided by the patient, the spouse and medical records. The history is limited by the condition of the patient.     Review of patient's allergies indicates:   Allergen Reactions    Ciprofloxacin hcl Hallucinations    Meperidine Hives     Pt medicated w/multiple medications (demerol, protonix, and zofran)  w/i 10 mins time frame prior to developing localized hives near IV site that med was administered. Pt reports he has/takes all other medications on daily basis.     Morphine Itching    Nsaids (non-steroidal anti-inflammatory drug)      Kidney Disease    Tylenol [acetaminophen]      Hx of liver disease     Past Medical History:   Diagnosis Date    Alcoholic cirrhosis of liver     Arthritis     ATN (acute tubular necrosis)     CHF (congestive heart failure)     Diverticulitis 01/2020    Esophageal varices     GERD (gastroesophageal reflux disease)     GI bleed     Hip arthritis     Left    Hypertension     Liver cirrhosis     Macrocytic anemia     Pulmonary embolism 08/2018    Unprovoked DVT.  Stop Coumadin due to GIB    Thrombocytopenia      Past Surgical History:   Procedure Laterality Date    ANKLE SURGERY      BLOCK, NERVE, PERIPHERAL Left  06/24/2022    Procedure: Left Hip femoral-obturator accessory nerve block;  Surgeon: Robbin De La Garza DO;  Location: [facility];  Service: Pain Management;  Laterality: Left;    COLON SURGERY  2007    [facility]OSCOPY  09/05/2019    [facility]    COLONOSCOPY N/A 02/17/2021    Procedure: COLONOSCOPY;  Surgeon: Renea Billingsley MD;  Location: [facility];  Service: Endoscopy;  Laterality: N/A;    COLONOSCOPY W/ BIOPSIES  02/17/2021    ESOPHAGOGASTRODUODENOSCOPY N/A 07/26/2019    Procedure: EGD (ESOPHAGOGASTRODUODENOSCOPY);  Surgeon: Brian Trivedi MD;  Location: [facility];  Service: Endoscopy;  Laterality: N/A;    ESOPHAGOGASTRODUODENOSCOPY N/A 04/08/2020    Procedure: EGD (ESOPHAGOGASTRODUODENOSCOPY);  Surgeon: Donn Acuna MD;  Location: [facility];  Service: Endoscopy;  Laterality: N/A;    ESOPHAGOGASTRODUODENOSCOPY N/A 01/03/2021    Procedure: EGD (ESOPHAGOGASTRODUODENOSCOPY)- coffee ground emesis, hx varices;  Surgeon: Steve Chairez MD;  Location: [facility];  Service: Endoscopy;  Laterality: N/A;    ESOPHAGOGASTRODUODENOSCOPY N/A 05/03/2021    Procedure: EGD (ESOPHAGOGASTRODUODENOSCOPY);  Surgeon: Jeanmarie Ngo MD;  Location: [facility];  Service: Endoscopy;  Laterality: N/A;    ESOPHAGOGASTRODUODENOSCOPY N/A 07/19/2021    Procedure: ESOPHAGOGASTRODUODENOSCOPY (EGD);  Surgeon: Claudio Medeiros MD;  Location: [facility];  Service: Endoscopy;  Laterality: N/A;    ESOPHAGOGASTRODUODENOSCOPY  12/20/2021    ESOPHAGOGASTRODUODENOSCOPY N/A 12/20/2021    Procedure: EGD (ESOPHAGOGASTRODUODENOSCOPY);  Surgeon: Ajay Jackson MD;  Location: [facility];  Service: Endoscopy;  Laterality: N/A;    ESOPHAGOGASTRODUODENOSCOPY N/A 05/03/2022    Procedure: EGD (ESOPHAGOGASTRODUODENOSCOPY);  Surgeon: Brian Trivedi MD;  Location: [facility];  Service: Endoscopy;  Laterality: N/A;    ESOPHAGOGASTRODUODENOSCOPY N/A 7/7/2022    Procedure: EGD (ESOPHAGOGASTRODUODENOSCOPY);  Surgeon: Ajay Jackson MD;   Location: [facility];  Service: Endoscopy;  Laterality: N/A;    HERNIA REPAIR Left     Inguinal    UPPER GASTROINTESTINAL ENDOSCOPY N/A 2022     Family History   Problem Relation Age of Onset    Hypertension Mother     Breast cancer Mother     Hypertension Father     Prostate cancer Father     Bladder Cancer Father      Social History     Tobacco Use    Smoking status: Former Smoker     Types: Cigarettes     Quit date: 2000     Years since quittin.5    Smokeless tobacco: Never Used    Tobacco comment: quit 20 years ago   Substance Use Topics    Alcohol use: Not Currently     Comment: freq    Drug use: Yes     Types: Marijuana     Comment: occasionally     Review of Systems   Constitutional: Negative for chills and fever.   HENT: Negative for congestion and sore throat.    Eyes: Negative for pain and visual disturbance.        Eye yellowing   Respiratory: Negative for cough and shortness of breath.    Cardiovascular: Negative for chest pain and leg swelling.   Gastrointestinal: Negative for abdominal pain, constipation, diarrhea, nausea and vomiting.   Endocrine: Negative for polydipsia and polyuria.   Genitourinary: Negative for dysuria and hematuria.   Musculoskeletal: Negative for arthralgias and back pain.   Skin: Positive for color change. Negative for rash.   Neurological: Negative for dizziness and headaches.       Physical Exam     Initial Vitals [22 1344]   BP Pulse Resp Temp SpO2   (!) 148/81 86 18 99 °F (37.2 °C) 100 %      MAP       --         Physical Exam    Nursing note and vitals reviewed.  Constitutional: He appears well-developed. He is not diaphoretic. No distress.   HENT:   Head: Normocephalic and atraumatic.   Eyes: Conjunctivae and EOM are normal. Pupils are equal, round, and reactive to light. Scleral icterus is present.   Neck: Neck supple. No JVD present.   Normal range of motion.  Cardiovascular: Normal rate, regular rhythm, normal heart sounds and intact distal  pulses.   No murmur heard.  Pulmonary/Chest: Breath sounds normal. No respiratory distress. He has no wheezes. He has no rhonchi. He has no rales.   Abdominal: Abdomen is soft. He exhibits no distension. There is no abdominal tenderness.   Soft, nondistended abdomen without TTP to light or deep palpation diffusely There is no rebound and no guarding.   Musculoskeletal:         General: No tenderness or edema.      Cervical back: Normal range of motion and neck supple.     Neurological: He is alert and oriented to person, place, and time.   No asterixis   Skin: Skin is warm and dry. Capillary refill takes less than 2 seconds.   Diffuse jaundice         ED Course   Procedures  Labs Reviewed   CBC W/ AUTO DIFFERENTIAL - Abnormal; Notable for the following components:       Result Value    RBC 2.26 (*)     Hemoglobin 8.1 (*)     Hematocrit 25.7 (*)      (*)     MCH 35.8 (*)     MCHC 31.5 (*)     RDW 21.8 (*)     Platelets 75 (*)     All other components within normal limits    Narrative:     Release to patient->Immediate   COMPREHENSIVE METABOLIC PANEL - Abnormal; Notable for the following components:    CO2 19 (*)     Glucose 122 (*)     Creatinine 2.1 (*)     Total Protein 8.7 (*)     Albumin 2.8 (*)     Total Bilirubin 6.8 (*)     AST 47 (*)     eGFR if  42.1 (*)     eGFR if non  36.4 (*)     All other components within normal limits    Narrative:     Release to patient->Immediate   LIPASE - Abnormal; Notable for the following components:    Lipase 402 (*)     All other components within normal limits    Narrative:     Release to patient->Immediate   AMMONIA - Abnormal; Notable for the following components:    Ammonia 67 (*)     All other components within normal limits    Narrative:     Release to patient->Immediate   PROTIME-INR - Abnormal; Notable for the following components:    Prothrombin Time 15.6 (*)     INR 1.5 (*)     All other components within normal limits    ALCOHOL,MEDICAL (ETHANOL)   HIV 1 / 2 ANTIBODY   HEPATITIS C ANTIBODY   URINALYSIS, REFLEX TO URINE CULTURE          Imaging Results    None          Medications   sodium chloride 0.9% bolus 500 mL (0 mLs Intravenous Stopped 7/18/22 1613)     Medical Decision Making:   History:   I obtained history from: someone other than patient.       <> Summary of History: Wife  Old Medical Records: I decided to obtain old medical records.  Initial Assessment:   47 yo M with suspected decompensated liver failure (2/2 alcohol use), hemodynamically stable but worsening jaundice and confusion  - labs  - CT Head  - 500cc NS  Differential Diagnosis:   Decompensated liver failure, hepatorenal syndrome, hyperammonemia, ENA, electrolyte derangement, intracranial process  Clinical Tests:   Lab Tests: Ordered and Reviewed  Radiological Study: Ordered and Reviewed  ED Management:  See ED course             ED Course as of 07/18/22 1636   Mon Jul 18, 2022   1455 Lipase(!): 402  Chronically elevated, lower suspicion for acute pancreatitis [BD]   1455 Ammonia(!): 67  Elevated compared to prior concerning for decompensated liver failure [BD]   1455 Comp. Metabolic Panel(!)  CMP shows ENA with Cr 2.1, significantly elevated bilirubin of 6.8 (slightly less than prior). No electrolyte derangement [BD]   1456 CBC W/ AUTO DIFFERENTIAL(!)  Stable H/H without leukocytosis. Thrombocytopenia of 75 [BD]   1603 INR(!): 1.5 [BD]   1603 Alcohol, Serum: <10 [BD]   1635 Patient has been admitted to Hospital Medicine for hepatic encephalopathy.  His CT head is still pending but he remains clinically and hemodynamically stable.  Patient and his wife agree with and are comfortable with the plan for admission.  [BD]      ED Course User Index  [BD] Shaheen Diana MD             Clinical Impression:   Final diagnoses:  [K72.90] Hepatic encephalopathy (Primary)  [R79.89] Increased ammonia level  [E80.6] Hyperbilirubinemia  [R41.82] Altered mental status,  unspecified altered mental status type          ED Disposition Condition    Admit               Shaheen Diana MD  Resident  07/18/22 3209

## 2022-07-18 NOTE — FIRST PROVIDER EVALUATION
Medical screening exam completed.  I have conducted a focused provider triage encounter, findings are as follows:    Brief history of present illness:  46yM ESLD awaiting transplant here with worsening jaundice. H./o CHF, PE, GI bleed, cirrhosis. Worsening abdominal pain.     Vitals:    07/18/22 1344   BP: (!) 148/81   Pulse: 86   Resp: 18   Temp: 99 °F (37.2 °C)   TempSrc: Oral   SpO2: 100%   Weight: 86.6 kg (191 lb)   Height: 6' (1.829 m)       Pertinent physical exam:  Gen: AxOx4, NAD, appears stated age, well appearing  Eye: EOMI, +scleral icterus, no periorbital edema or ecchymosis  Head: Normocephalic, atraumatic, no lesions, scalp grossly normal  ENT: neck supple, no stridor, no masses, no abnormal phonation or drooling  CVS: warm and well perfused, cap refill <2 seconds, no extremity pallor  PULM: normal work of breathing, no stridor, equal chest rise, no peripheral cyanosis  ABD: scaphoid, nondistended  Ext: no rash, no deformities, moving all joints with normal ROM  Neuro: SANTOYO, gait intact, face grossly symmetric  Psych: well groomed, mood and affect congruent, makes good eye contact, cooperative      Brief workup plan:  labs    Preliminary workup initiated; this workup will be continued and followed by the physician or advanced practice provider that is assigned to the patient when roomed.

## 2022-07-19 PROBLEM — F10.20 ALCOHOL USE DISORDER, SEVERE, DEPENDENCE: Chronic | Status: ACTIVE | Noted: 2022-07-19

## 2022-07-19 PROBLEM — Z01.818 ENCOUNTER FOR PRE-TRANSPLANT EVALUATION FOR LIVER TRANSPLANT: Status: ACTIVE | Noted: 2022-07-19

## 2022-07-19 LAB
ALBUMIN SERPL BCP-MCNC: 2.7 G/DL (ref 3.5–5.2)
ALP SERPL-CCNC: 69 U/L (ref 55–135)
ALT SERPL W/O P-5'-P-CCNC: 18 U/L (ref 10–44)
ANION GAP SERPL CALC-SCNC: 9 MMOL/L (ref 8–16)
ASCENDING AORTA: 3.3 CM
AST SERPL-CCNC: 47 U/L (ref 10–40)
AV INDEX (PROSTH): 0.88
AV MEAN GRADIENT: 4 MMHG
AV PEAK GRADIENT: 7 MMHG
AV VALVE AREA: 3.03 CM2
AV VELOCITY RATIO: 0.8
BASOPHILS # BLD AUTO: 0.04 K/UL (ref 0–0.2)
BASOPHILS NFR BLD: 0.6 % (ref 0–1.9)
BILIRUB SERPL-MCNC: 6.2 MG/DL (ref 0.1–1)
BSA FOR ECHO PROCEDURE: 2.1 M2
BUN SERPL-MCNC: 17 MG/DL (ref 6–20)
CALCIUM SERPL-MCNC: 8.8 MG/DL (ref 8.7–10.5)
CHLORIDE SERPL-SCNC: 110 MMOL/L (ref 95–110)
CO2 SERPL-SCNC: 19 MMOL/L (ref 23–29)
CREAT SERPL-MCNC: 2.1 MG/DL (ref 0.5–1.4)
CREAT UR-MCNC: 112 MG/DL (ref 23–375)
CREAT UR-MCNC: 112 MG/DL (ref 23–375)
CV ECHO LV RWT: 0.38 CM
DIFFERENTIAL METHOD: ABNORMAL
DOP CALC AO PEAK VEL: 1.32 M/S
DOP CALC AO VTI: 26.38 CM
DOP CALC LVOT AREA: 3.5 CM2
DOP CALC LVOT DIAMETER: 2.1 CM
DOP CALC LVOT PEAK VEL: 1.06 M/S
DOP CALC LVOT STROKE VOLUME: 79.97 CM3
DOP CALCLVOT PEAK VEL VTI: 23.1 CM
E WAVE DECELERATION TIME: 413.93 MSEC
E/A RATIO: 1.13
E/E' RATIO: 10.75 M/S
ECHO LV POSTERIOR WALL: 1.09 CM (ref 0.6–1.1)
EJECTION FRACTION: 63 %
EOSINOPHIL # BLD AUTO: 0.2 K/UL (ref 0–0.5)
EOSINOPHIL NFR BLD: 2.8 % (ref 0–8)
ERYTHROCYTE [DISTWIDTH] IN BLOOD BY AUTOMATED COUNT: 21.3 % (ref 11.5–14.5)
EST. GFR  (AFRICAN AMERICAN): 42.1 ML/MIN/1.73 M^2
EST. GFR  (NON AFRICAN AMERICAN): 36.4 ML/MIN/1.73 M^2
FRACTIONAL SHORTENING: 37 % (ref 28–44)
GLUCOSE SERPL-MCNC: 98 MG/DL (ref 70–110)
HCT VFR BLD AUTO: 23.2 % (ref 40–54)
HCV AB SERPL QL IA: NEGATIVE
HGB BLD-MCNC: 7.5 G/DL (ref 14–18)
HIV 1+2 AB+HIV1 P24 AG SERPL QL IA: NEGATIVE
IMM GRANULOCYTES # BLD AUTO: 0.01 K/UL (ref 0–0.04)
IMM GRANULOCYTES NFR BLD AUTO: 0.1 % (ref 0–0.5)
INR PPP: 1.6 (ref 0.8–1.2)
INTERVENTRICULAR SEPTUM: 0.86 CM (ref 0.6–1.1)
LA MAJOR: 5.13 CM
LA MINOR: 4.9 CM
LA WIDTH: 4.41 CM
LEFT ATRIUM SIZE: 4.14 CM
LEFT ATRIUM VOLUME INDEX MOD: 40.5 ML/M2
LEFT ATRIUM VOLUME INDEX: 37.2 ML/M2
LEFT ATRIUM VOLUME MOD: 84.61 CM3
LEFT ATRIUM VOLUME: 77.79 CM3
LEFT INTERNAL DIMENSION IN SYSTOLE: 3.67 CM (ref 2.1–4)
LEFT VENTRICLE DIASTOLIC VOLUME INDEX: 79.34 ML/M2
LEFT VENTRICLE DIASTOLIC VOLUME: 165.83 ML
LEFT VENTRICLE MASS INDEX: 108 G/M2
LEFT VENTRICLE SYSTOLIC VOLUME INDEX: 27.2 ML/M2
LEFT VENTRICLE SYSTOLIC VOLUME: 56.9 ML
LEFT VENTRICULAR INTERNAL DIMENSION IN DIASTOLE: 5.79 CM (ref 3.5–6)
LEFT VENTRICULAR MASS: 224.89 G
LV LATERAL E/E' RATIO: 9.56 M/S
LV SEPTAL E/E' RATIO: 12.29 M/S
LYMPHOCYTES # BLD AUTO: 1.9 K/UL (ref 1–4.8)
LYMPHOCYTES NFR BLD: 25.8 % (ref 18–48)
MCH RBC QN AUTO: 35.4 PG (ref 27–31)
MCHC RBC AUTO-ENTMCNC: 32.3 G/DL (ref 32–36)
MCV RBC AUTO: 109 FL (ref 82–98)
MONOCYTES # BLD AUTO: 0.8 K/UL (ref 0.3–1)
MONOCYTES NFR BLD: 11.4 % (ref 4–15)
MV PEAK A VEL: 0.76 M/S
MV PEAK E VEL: 0.86 M/S
NEUTROPHILS # BLD AUTO: 4.3 K/UL (ref 1.8–7.7)
NEUTROPHILS NFR BLD: 59.3 % (ref 38–73)
NRBC BLD-RTO: 0 /100 WBC
PLATELET # BLD AUTO: 66 K/UL (ref 150–450)
PMV BLD AUTO: 11.2 FL (ref 9.2–12.9)
POTASSIUM SERPL-SCNC: 3.7 MMOL/L (ref 3.5–5.1)
PROT SERPL-MCNC: 8.2 G/DL (ref 6–8.4)
PROT UR-MCNC: <7 MG/DL (ref 0–15)
PROT/CREAT UR: NORMAL MG/G{CREAT} (ref 0–0.2)
PROTHROMBIN TIME: 16.1 SEC (ref 9–12.5)
RA MAJOR: 4.88 CM
RA WIDTH: 2.61 CM
RBC # BLD AUTO: 2.12 M/UL (ref 4.6–6.2)
RIGHT VENTRICULAR END-DIASTOLIC DIMENSION: 3.9 CM
RV TISSUE DOPPLER FREE WALL SYSTOLIC VELOCITY 1 (APICAL 4 CHAMBER VIEW): 15.23 CM/S
SINUS: 3.34 CM
SODIUM SERPL-SCNC: 138 MMOL/L (ref 136–145)
SODIUM UR-SCNC: 140 MMOL/L (ref 20–250)
STJ: 3.09 CM
TDI LATERAL: 0.09 M/S
TDI SEPTAL: 0.07 M/S
TDI: 0.08 M/S
TRICUSPID ANNULAR PLANE SYSTOLIC EXCURSION: 3.05 CM
UUN UR-MCNC: 421 MG/DL (ref 140–1050)
WBC # BLD AUTO: 7.26 K/UL (ref 3.9–12.7)

## 2022-07-19 PROCEDURE — 85025 COMPLETE CBC W/AUTO DIFF WBC: CPT | Mod: NTX | Performed by: HOSPITALIST

## 2022-07-19 PROCEDURE — 63600175 PHARM REV CODE 636 W HCPCS: Mod: NTX | Performed by: HOSPITALIST

## 2022-07-19 PROCEDURE — C9113 INJ PANTOPRAZOLE SODIUM, VIA: HCPCS | Mod: NTX | Performed by: HOSPITALIST

## 2022-07-19 PROCEDURE — 99223 PR INITIAL HOSPITAL CARE,LEVL III: ICD-10-PCS | Mod: NTX,,, | Performed by: PSYCHIATRY & NEUROLOGY

## 2022-07-19 PROCEDURE — 36415 COLL VENOUS BLD VENIPUNCTURE: CPT | Mod: NTX | Performed by: HOSPITALIST

## 2022-07-19 PROCEDURE — 25000003 PHARM REV CODE 250: Mod: NTX | Performed by: INTERNAL MEDICINE

## 2022-07-19 PROCEDURE — 99223 1ST HOSP IP/OBS HIGH 75: CPT | Mod: NTX,,, | Performed by: PSYCHIATRY & NEUROLOGY

## 2022-07-19 PROCEDURE — 80053 COMPREHEN METABOLIC PANEL: CPT | Mod: NTX | Performed by: HOSPITALIST

## 2022-07-19 PROCEDURE — 85610 PROTHROMBIN TIME: CPT | Mod: NTX | Performed by: HOSPITALIST

## 2022-07-19 PROCEDURE — 84540 ASSAY OF URINE/UREA-N: CPT | Mod: NTX | Performed by: HOSPITALIST

## 2022-07-19 PROCEDURE — P9047 ALBUMIN (HUMAN), 25%, 50ML: HCPCS | Mod: JG,NTX | Performed by: HOSPITALIST

## 2022-07-19 PROCEDURE — 84300 ASSAY OF URINE SODIUM: CPT | Mod: NTX | Performed by: HOSPITALIST

## 2022-07-19 PROCEDURE — 25000003 PHARM REV CODE 250: Mod: NTX | Performed by: HOSPITALIST

## 2022-07-19 PROCEDURE — 82570 ASSAY OF URINE CREATININE: CPT | Mod: NTX | Performed by: HOSPITALIST

## 2022-07-19 PROCEDURE — 99223 PR INITIAL HOSPITAL CARE,LEVL III: ICD-10-PCS | Mod: GC,NTX,, | Performed by: INTERNAL MEDICINE

## 2022-07-19 PROCEDURE — 99223 1ST HOSP IP/OBS HIGH 75: CPT | Mod: GC,NTX,, | Performed by: INTERNAL MEDICINE

## 2022-07-19 PROCEDURE — 20600001 HC STEP DOWN PRIVATE ROOM: Mod: NTX

## 2022-07-19 RX ORDER — TRAMADOL HYDROCHLORIDE 50 MG/1
50 TABLET ORAL EVERY 12 HOURS PRN
Status: ON HOLD | COMMUNITY
End: 2022-07-20 | Stop reason: HOSPADM

## 2022-07-19 RX ORDER — ALBUMIN HUMAN 250 G/1000ML
25 SOLUTION INTRAVENOUS ONCE
Status: COMPLETED | OUTPATIENT
Start: 2022-07-19 | End: 2022-07-19

## 2022-07-19 RX ORDER — TRAMADOL HYDROCHLORIDE 50 MG/1
50 TABLET ORAL ONCE
Status: COMPLETED | OUTPATIENT
Start: 2022-07-19 | End: 2022-07-19

## 2022-07-19 RX ORDER — HYDROMORPHONE HYDROCHLORIDE 2 MG/1
2 TABLET ORAL EVERY 8 HOURS PRN
Status: DISCONTINUED | OUTPATIENT
Start: 2022-07-19 | End: 2022-07-20 | Stop reason: HOSPADM

## 2022-07-19 RX ORDER — LIDOCAINE 50 MG/G
1 PATCH TOPICAL
Status: DISCONTINUED | OUTPATIENT
Start: 2022-07-19 | End: 2022-07-20 | Stop reason: HOSPADM

## 2022-07-19 RX ORDER — GABAPENTIN 300 MG/1
300 CAPSULE ORAL ONCE
Status: COMPLETED | OUTPATIENT
Start: 2022-07-19 | End: 2022-07-19

## 2022-07-19 RX ORDER — OCTREOTIDE ACETATE 100 UG/ML
100 INJECTION, SOLUTION INTRAVENOUS; SUBCUTANEOUS EVERY 8 HOURS
Status: DISCONTINUED | OUTPATIENT
Start: 2022-07-19 | End: 2022-07-20 | Stop reason: HOSPADM

## 2022-07-19 RX ORDER — DIPHENHYDRAMINE HCL 25 MG
25 CAPSULE ORAL 2 TIMES DAILY PRN
Status: ON HOLD | COMMUNITY
End: 2022-07-20 | Stop reason: HOSPADM

## 2022-07-19 RX ORDER — ALBUMIN HUMAN 250 G/1000ML
25 SOLUTION INTRAVENOUS 2 TIMES DAILY
Status: DISCONTINUED | OUTPATIENT
Start: 2022-07-20 | End: 2022-07-20 | Stop reason: HOSPADM

## 2022-07-19 RX ADMIN — LACTULOSE 30 G: 20 SOLUTION ORAL at 09:07

## 2022-07-19 RX ADMIN — RIFAXIMIN 550 MG: 550 TABLET ORAL at 09:07

## 2022-07-19 RX ADMIN — TRAMADOL HYDROCHLORIDE 50 MG: 50 TABLET, COATED ORAL at 03:07

## 2022-07-19 RX ADMIN — CARVEDILOL 3.12 MG: 3.12 TABLET, FILM COATED ORAL at 09:07

## 2022-07-19 RX ADMIN — ONDANSETRON 8 MG: 4 TABLET, ORALLY DISINTEGRATING ORAL at 10:07

## 2022-07-19 RX ADMIN — HYDROMORPHONE HYDROCHLORIDE 2 MG: 2 TABLET ORAL at 09:07

## 2022-07-19 RX ADMIN — TRAMADOL HYDROCHLORIDE 50 MG: 50 TABLET, COATED ORAL at 05:07

## 2022-07-19 RX ADMIN — ALBUMIN (HUMAN) 25 G: 12.5 SOLUTION INTRAVENOUS at 11:07

## 2022-07-19 RX ADMIN — OCTREOTIDE ACETATE 100 MCG: 100 INJECTION, SOLUTION INTRAVENOUS; SUBCUTANEOUS at 09:07

## 2022-07-19 RX ADMIN — LACTULOSE 30 G: 20 SOLUTION ORAL at 05:07

## 2022-07-19 RX ADMIN — HYDROMORPHONE HYDROCHLORIDE 2 MG: 2 TABLET ORAL at 11:07

## 2022-07-19 RX ADMIN — GABAPENTIN 300 MG: 300 CAPSULE ORAL at 02:07

## 2022-07-19 RX ADMIN — LIDOCAINE 1 PATCH: 50 PATCH CUTANEOUS at 02:07

## 2022-07-19 RX ADMIN — PANTOPRAZOLE SODIUM 40 MG: 40 INJECTION, POWDER, FOR SOLUTION INTRAVENOUS at 09:07

## 2022-07-19 RX ADMIN — CEFTRIAXONE 2 G: 2 INJECTION, SOLUTION INTRAVENOUS at 05:07

## 2022-07-19 RX ADMIN — FOLIC ACID 1 MG: 1 TABLET ORAL at 09:07

## 2022-07-19 RX ADMIN — THIAMINE HCL TAB 100 MG 100 MG: 100 TAB at 09:07

## 2022-07-19 RX ADMIN — OCTREOTIDE ACETATE 100 MCG: 100 INJECTION, SOLUTION INTRAVENOUS; SUBCUTANEOUS at 05:07

## 2022-07-19 NOTE — CONSULTS
7/19/2022 12:05 PM  Irvin Diaz Jr.  1975  [id number]        ADDICTION CONSULT INITIAL EVALUATION      DEPARTMENT:  Psychiatry  SITE: [facility], [address]     DATE OF ADMISSION: 7/18/2022  2:49 PM  LENGTH OF STAY: 1 days     EXAMINING PRACTITIONER: Andrew Crook     CONSULT REQUESTED BY: Chanel Hernández MD        SUBJECTIVE      CHIEF COMPLAINT  Irvin Diaz Jr. is a 46 y.o. male who is seen today for an initial psychiatric evaluation by the addiction psychiatry consult service.  Irvin Diaz Jr. presents with the chief complaint of: pre-transplant evaluation        HISTORY OF PRESENT ILLNESS     Per Primary MD:  46-year-old male with PMH of alcoholic cirrhosis of the liver, CHF, PE, and esophageal varices who presents with confusion.  Per patient's wife, the confusion is new the past day, insidious onset, constant, progressive, without known aggravating relieving factors, and associated with worsening yellowing of the patient's eyes.  Patient is unable to provide a clear history secondary to his confusion but he denies any pain or discomfort at this time.  He states he thought he was has come to the hospital for a DEXA scan and he is not sure why he is here.  Wife denies that the patient takes any lactulose at home.       The history is provided by the patient, the spouse and medical records. The history is limited by the condition of the patient.         Per Addiction Psych MD:  The patient was spoken too at bedside.  Though he showed confusion on admission, at the time of this interview he was alert, oriented and able to participate meaningfully in the discussion.  His mother was present at bedside with his permission.  He stated that he does have an extensive history of alcohol use.  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  He is now seeing a pain doctor but only recently started.  He also notes that many of his close friends are  drinkers and is often around the wrong crowd.    In the last year he said his drinking has been on and off.  At its worst he will drink about 1 pint of gin a day.  He has not had a drink in the last 2 weeks and is not noting withdrawal symptoms currently. He did a IOP in [place] a few months back and was able to stay sober for 1 month.  He says he would be able to do this again or our program here at [facility].    He denies psychiatric issues outside of the present issue.  Notes he has never seen a psychiatrist and does not struggle with depression, anxiety, AVH, or thoughts of self harm.  He minimally uses marijuana but otherwise denies drug use.         COLLATERAL  Mother present at bedside        SUBSTANCE ABUSE HISTORY  Substance(s) of Choice: Alcohol  Substances Used: Marijuana  History of IVDU?: No  Use of Alcohol: Up to 1 pint a day of gin  Average Consumption: above  Last Drink: 2 wees ago   Use of Medications for Alcohol/Opioid Use Disorder: no  History of Complicated Withdrawal: no  History of Detox: yes  Rehab History: Yes, an IOP in [place] a few months ago  AA/NA involvement: yes, but minimal engagement, only during his IOP  Tobacco: Yes  Spouse/Partner Consumption: No  Patient Aware of Biomedical Complications: Yes     DSM-5 SUBSTANCE USE DISORDER CRITERIA   Mild (1-3), Moderate (4-5), Severe (?6)  1. Often take in larger amounts or over a longer period of time than was intended.  2. Persistent desire or unsuccessful efforts to cut down or control use.  3. Great deal of time spent in activities necessary to obtain substance, use, or recover from effects.  4. Craving/strong desire for substance or urge to use.  5. Use resulting in failure to fulfill major role obligations at home, work or school.  6. Social, occupational, recreational activities decreased because of use.  7. Continued use despite having persistent or recurrent social or interpersonal problems cause or exaserbated by the  substance.  8. Recurrent use in situations in which it is physically hazardous.  9. Use despite physical or psychological problems that are likely to have been caused or exacerbated by the substance.  10. Tolerance, as defined by either of the following.              A. A need for markedly increased amounts of substance to achieve intoxication or desired effect. -OR-               B. A markedly diminished effect with continued use of the same amount of substance.  11. Withdrawal, as manifested by the following.              A. The characteristic withdrawal syndrome for substance. -AND-              B. Substance is taken to relieve or avoid withdrawal symptoms.     ARE THE CRITERIA MET FOR DSM-5 SUBSTANCE USE DISORDER: Yes, severe        TRANSPLANT EVALUATION  Date first informed of impending organ failure - Several months ago  When was the subject of transplantation first broached - months ago with his hepatologist   Pt made the following lifestyle adjustments and how - Has tried rehab, but has relapsed  Does the patient accept/understand the connection between substance use and subsequent organ failure - yes  Date of last use of substances - 2 weeks ago  Understands need for lifetime medication - yes  Understands need for lifetime sobriety - yes  View of AA/NA - ok, but has only minimally engaged in past  Social support - Good, lives with mother who is supportive of him and does not drink herself         PSYCHIATRIC HISTORY  Reported Diagnose(s): Alcohol use disorder  Previous Medication Trials: No  Previous Psychiatric Hospitalizations: No  Outpatient Psychiatrist?: No        SUICIDE/HOMICIDE RISK ASSESSMENT  Current/active suicidal ideation/plan/intent: No  Previous suicide attempts: No  Current/active homicidal ideation/plan/intent: No        HISTORY OF TRAUMA, ABUSE & VIOLENCE  Trauma: No  Physical Abuse: No  Sexual Abuse: No  Violent Conduct: No     Access to Gun: Denies         FAMILY PSYCHIATRIC HISTORY    Denies, states he is only one of family with substance use issues         SOCIAL HISTORY  Lives with mother  Has 6 kids,  from wife  On disability        PAST MEDICAL HISTORY   CHF, PE, Esophageal Varices, Avascular necrosis of hips  Liver Cirrhosis         PSYCHOSOCIAL FACTORS  Stressors (Psychosocial and Environmental): financial, health and drug and alcohol.         PSYCHIATRIC ROS  Psychiatric Review of Systems  Is the patient experiences or having changes in:  sleep: no  appetite: no  weight: no  energy/anergy: no  interest/pleasure/anhedonia: no  somatic symptoms: no  guilty/hopelessness: no  concentration: no  S.I.B.s/risky behavior: yes  SI/SA:  no     anxiety/panic: no  Agoraphobia:  no  Social phobia:  no  Recurrent nightmares:  no  hyper startle response:  no  Avoidance: no  Recurrent thoughts:  no  Recurrent behaviors:  no     Irritability: no  Racing thoughts: no  Impulsive behaviors: no  Pressured speech:  no     Paranoia:no  Delusions: no  AVH:no        MEDICAL ROS     Complete review of systems performed covering Constitutional, Eyes, ENT/Mouth, Cardiovascular, Respiratory, Gastrointestinal, Genitourinary, Musculoskeletal, Skin, Neurologic, Endocrine, Heme/Lymph, and Allergy/Immune.      Complete review of systems was negative with the exception of the following positive symptoms: Fatigue, Hip pain        ALLERGIES  Ciprofloxacin hcl, Meperidine, Morphine, Nsaids (non-steroidal anti-inflammatory drug), and Tylenol [acetaminophen]        MEDICATIONS     Psychotropics:  None     Infusions:     Scheduled:   albumin human 25%  25 g Intravenous Once    [START ON 7/20/2022] albumin human 25%  25 g Intravenous BID    carvediloL  3.125 mg Oral BID    cefTRIAXone (ROCEPHIN) IVPB  2 g Intravenous Q24H    folic acid  1 mg Oral Daily    lactulose  30 g Oral TID    LIDOcaine  1 patch Transdermal Q24H    octreotide  100 mcg Subcutaneous Q8H    pantoprazole  40 mg Intravenous Daily    rifAXImin   550 mg Oral BID    thiamine  100 mg Oral Daily         PRN:  dextrose 10%, dextrose 10%, glucagon (human recombinant), glucose, glucose, HYDROmorphone, insulin aspart U-100, lactulose, naloxone, ondansetron, prochlorperazine, simethicone, sodium chloride 0.9%, traMADoL     Home Medications:          Prior to Admission medications    Medication Sig Start Date End Date Taking? Authorizing Provider   carvediloL (COREG) 6.25 MG tablet Take 1 tablet (6.25 mg total) by mouth Daily. 6/30/22     Edouard Gibson MD   folic acid (FOLVITE) 1 MG tablet Take 1 tablet (1 mg total) by mouth once daily. 6/30/22     Edouard Gibson MD   gabapentin (NEURONTIN) 300 MG capsule Take 1 capsule (300 mg total) by mouth every evening for 3 days, THEN 1 capsule (300 mg total) 2 (two) times daily for 3 days, THEN 1 capsule (300 mg total) 3 (three) times daily for 24 days. 6/30/22 7/30/22   Edouard Gibson MD   multivitamin Tab Take 1 tablet by mouth once daily. 5/5/22     Cyrus Gauthier MD   oxyCODONE (ROXICODONE) 5 MG immediate release tablet Take 1 tablet (5 mg total) by mouth every 12 (twelve) hours as needed for Pain. 7/8/22 8/7/22   Robbin De La Garza DO   pantoprazole (PROTONIX) 40 MG tablet Take 1 tablet (40 mg total) by mouth once daily. 7/7/22 7/2/23   Parag Ty MD   potassium chloride SA (K-DUR,KLOR-CON) 20 MEQ tablet Take 1 tablet (20 mEq total) by mouth once daily. 6/30/22     Edouard Gibson MD   rifAXIMin (XIFAXAN) 550 mg Tab Take 1 tablet (550 mg total) by mouth 2 (two) times daily.  Patient not taking: Reported on 7/8/2022 6/27/22     Jacek Arreaga MD   sucralfate (CARAFATE) 1 gram tablet Take 1 g by mouth 4 (four) times daily.       Historical Provider   thiamine 100 MG tablet Take 1 tablet (100 mg total) by mouth once daily. 6/30/22     Edouard Gibson MD   triamcinolone acetonide 0.1% (KENALOG) 0.1 % ointment Apply topically 2 (two) times daily. So not apply to face or anogenital region  "6/30/22     Edouard Gibson MD   acamprosate (CAMPRAL) 333 mg tablet Take 666 mg by mouth 3 (three) times daily.   5/4/22   Historical Provider   furosemide (LASIX) 40 MG tablet Take 0.5 tablets (20 mg total) by mouth once daily. 6/8/21 3/12/22   Jarret Hernandez MD   NIFEdipine (PROCARDIA-XL) 30 MG (OSM) 24 hr tablet Take 1 tablet (30 mg total) by mouth once daily. 3/13/22 5/4/22   Parag Ty MD   spironolactone (ALDACTONE) 50 MG tablet Take 1 tablet (50 mg total) by mouth once daily.  Patient not taking: No sig reported 6/9/21 3/12/22   Jarret Hernandez MD            OBJECTIVE:      EXAMINATION     Vitals          Vitals:     07/19/22 1103 07/19/22 1104 07/19/22 1136 07/19/22 1141   BP: (!) 160/90 (!) 160/90 (!) 184/104     BP Location:     Left arm     Patient Position:     Sitting     Pulse: 90 90 73     Resp: 18 18 18 18   Temp: 98.3 °F (36.8 °C) 98.3 °F (36.8 °C) 97.7 °F (36.5 °C)     TempSrc:     Oral     SpO2: 98% 98% 100%     Weight:   86.6 kg (191 lb)       Height:   6' (1.829 m)                PAIN   3/10     CONSTITUTIONAL  General Appearance and Manner: Appears fatigued, older than stated age, cooperative     MUSCULOSKELETAL  Abnormal Involuntary Movements: No  Muscle Strength and Tone:  Full  Gait and Station: Normal       PSYCHIATRIC   Orientation: AOx3  Speech: Articulate, no slurring noted, linear  Language: Fluid english  Mood: "good"  Affect: Euthymic  Thought Process: Linear/goal oriented  Associations: No loosening of associations on conversation  Thought Content: No SI, HI, AVH or paranoia  Memory: No recent or remote impairments  Attention and Concentration: Intact to conversation  Fund of Knowledge: Appropriate  Insight: Fair, understands the negative consequences of alcohol on health and can appreciate long term implications  Judgment: Fair, has taken some steps but not fully engaged in the treatment needed to obtain sobriety           ASSESSMENT:      DIAGNOSES & " PROBLEMS     Alcohol use disorder, severe        STRENGTHS AND LIABILITIES   Strength: Patient accepts guidance/feedback, Strength: Patient is motivated for change., Strength: Patient has positive support network.        MOTIVATION TO PURSUE RECOVERY: moderate     ACCEPTANCE OF ADDICTION: good     ABILITY TO ADHERE TO TREATMENT PLAN: moderate        PLAN:         TRANSPLANT SUITABILITY  From a psychiatric perspective, this patient presents as a High risk for transplant.        MANAGEMENT PLAN, TREATMENT GOALS, THERAPEUTIC TECHNIQUES/APPROACHES & CLINICAL REASONING     - Pt 2 weeks from last drink and without signs of alcohol withdrawal currently  - Currently, we consider this patient to be high risk given recent drinking in the last month and relapse despite attending abstinence programs  - He expressed motivation to improve, and willingness to attend an IOP whether it is ours at [facility] or an alternative.  Information was provided to him regarding the different programs he can attend        · Patient counseled on abstinence from alcohol and substances of abuse (illicit and prescription).  · Additional workup planned to address substance use disorder, in order to guide and refine ongoing management options, includes serial alcohol and drug laboratory testing (e.g. PETH, urine toxicology).  · Relapse prevention and motivational interviewing provided.  · Education provided on 12 step recovery programs.        PRESCRIPTION DRUG MANAGEMENT  - The risks and benefits of medication were discussed with this patient.  - Possible expectable adverse effects of any current or proposed individual psychotropic agents were discussed with this patient.  - Counseling was provided on the importance of full compliance with medication regimens.        In cases of emergency, daily coverage provided by Acute/ER Psych MD, NP, or SW, with contact numbers located in [facility] On Call Schedule     Case discussed with staff  addiction psychiatrist: MD Andrew NGUYEN M.D.  [facility], PGY-4

## 2022-07-19 NOTE — PHARMACY MED REC
"Admission Medication History     The home medication history was taken by Christine Powers.    You may go to "Admission" then "Reconcile Home Medications" tabs to review and/or act upon these items.      The home medication list has been updated by the Pharmacy department.    Please read ALL comments highlighted in yellow.    Please address this information as you see fit.     Feel free to contact us if you have any questions or require assistance.      The medications listed below were removed from the home medication list. Please reorder if appropriate:  Patient reports no longer taking the following medication(s):   ACAMPROSATE 333MG   FUROSEMIDE 40MG   MULTIVITAMIN   NIFEDIPINE XL 30MG   OXYCODONE 5MG IMMEDIATE RELEASE    SPIRONOLACTONE 50MG    Medications listed below were obtained from: Patient/family  PTA Medications   Medication Sig    carvediloL (COREG) 6.25 MG tablet   Take 1 tablet (6.25 mg total) by mouth Daily.    diphenhydrAMINE (BENADRYL) 25 mg capsule   Take 25 mg by mouth 2 (two) times daily as needed for Itching.    folic acid (FOLVITE) 1 MG tablet   Take 1 tablet (1 mg total) by mouth once daily.    gabapentin (NEURONTIN) 300 MG capsule   Take 1 capsule by mouth three times daily    pantoprazole (PROTONIX) 40 MG tablet   Take 40 mg by mouth 2 (two) times daily.    potassium chloride SA (K-DUR,KLOR-CON) 20 MEQ tablet   Take 1 tablet (20 mEq total) by mouth once daily.    sucralfate (CARAFATE) 1 gram tablet   Take 1 g by mouth 4 (four) times daily.    thiamine 100 MG tablet   Take 1 tablet (100 mg total) by mouth once daily.    traMADoL (ULTRAM) 50 mg tablet   Take 50 mg by mouth every 12 (twelve) hours as needed for Pain.    triamcinolone acetonide 0.1% (KENALOG) 0.1 % ointment   Apply topically 2 (two) times daily as needed (itching). do not apply to face or anogenital region    rifAXIMin (XIFAXAN) 550 mg Tab Take 1 tablet (550 mg total) by mouth 2 (two) times daily. (Patient " not taking: Reported on 7/8/2022)       Potential issues to be addressed PRIOR TO DISCHARGE  Patient reported not taking the following medications: (RIFAXIMIN 550MG). These medications remain on the home medication list. Please address accordingly.   Please discuss with the patient barriers to adherence with medication treatment plans  Patient requires education regarding drug therapies   Drug cost interfering with therapy: (RIFAXIMIN 550MG )    Christine Powers  EXT [phone]                  .

## 2022-07-19 NOTE — PROGRESS NOTES
7/19/2022 12:05 PM  Irvin Diaz Jr.  1975  [id number]      ADDICTION CONSULT INITIAL EVALUATION     DEPARTMENT:  Psychiatry  SITE: [facility] [address]    DATE OF ADMISSION: 7/18/2022  2:49 PM  LENGTH OF STAY: 1 days    EXAMINING PRACTITIONER: Andrew Crook    CONSULT REQUESTED BY: Chanel Hernández MD      SUBJECTIVE     CHIEF COMPLAINT  Irvin Diaz Jr. is a 46 y.o. male who is seen today for an initial psychiatric evaluation by the addiction psychiatry consult service.  Irvin Diaz Jr. presents with the chief complaint of: pre-transplant evaluation      HISTORY OF PRESENT ILLNESS    Per Primary MD:  46-year-old male with PMH of alcoholic cirrhosis of the liver, CHF, PE, and esophageal varices who presents with confusion.  Per patient's wife, the confusion is new the past day, insidious onset, constant, progressive, without known aggravating relieving factors, and associated with worsening yellowing of the patient's eyes.  Patient is unable to provide a clear history secondary to his confusion but he denies any pain or discomfort at this time.  He states he thought he was has come to the hospital for a DEXA scan and he is not sure why he is here.  Wife denies that the patient takes any lactulose at home.       The history is provided by the patient, the spouse and medical records. The history is limited by the condition of the patient.       Per Addiction Psych MD:  The patient was spoken too at bedside.  Though he showed confusion on admission, at the time of this interview he was alert, oriented and able to participate meaningfully in the discussion.  His mother was present at bedside with his permission.  He stated that he does have an extensive history of alcohol use.  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  He is now seeing a pain doctor but only recently started.  He also notes that many of his close friends are drinkers and is  often around the wrong crowd.    In the last year he said his drinking has been on and off.  At its worst he will drink about 1 pint of gin a day.  He has not had a drink in the last 2 weeks and is not noting withdrawal symptoms currently. He did a IOP in [place] a few months back and was able to stay sober for 1 month.  He says he would be able to do this again or our program here at [facility].    He denies psychiatric issues outside of the present issue.  Notes he has never seen a psychiatrist and does not struggle with depression, anxiety, AVH, or thoughts of self harm.  He minimally uses marijuana but otherwise denies drug use.       COLLATERAL  Mother present at bedside      SUBSTANCE ABUSE HISTORY  Substance(s) of Choice: Alcohol  Substances Used: Marijuana  History of IVDU?: No  Use of Alcohol: Up to 1 pint a day of gin  Average Consumption: above  Last Drink: 2 wees ago   Use of Medications for Alcohol/Opioid Use Disorder: no  History of Complicated Withdrawal: no  History of Detox: yes  Rehab History: Yes, an IOP in [place] a few months ago  AA/NA involvement: yes, but minimal engagement, only during his IOP  Tobacco: Yes  Spouse/Partner Consumption: No  Patient Aware of Biomedical Complications: Yes    DSM-5 SUBSTANCE USE DISORDER CRITERIA   Mild (1-3), Moderate (4-5), Severe (?6)  1. Often take in larger amounts or over a longer period of time than was intended.  2. Persistent desire or unsuccessful efforts to cut down or control use.  3. Great deal of time spent in activities necessary to obtain substance, use, or recover from effects.  4. Craving/strong desire for substance or urge to use.  5. Use resulting in failure to fulfill major role obligations at home, work or school.  6. Social, occupational, recreational activities decreased because of use.  7. Continued use despite having persistent or recurrent social or interpersonal problems cause or exaserbated by the substance.  8. Recurrent use in  situations in which it is physically hazardous.  9. Use despite physical or psychological problems that are likely to have been caused or exacerbated by the substance.  10. Tolerance, as defined by either of the following.   A. A need for markedly increased amounts of substance to achieve intoxication or desired effect. -OR-    B. A markedly diminished effect with continued use of the same amount of substance.  11. Withdrawal, as manifested by the following.   A. The characteristic withdrawal syndrome for substance. -AND-   B. Substance is taken to relieve or avoid withdrawal symptoms.    ARE THE CRITERIA MET FOR DSM-5 SUBSTANCE USE DISORDER: Yes, severe      TRANSPLANT EVALUATION  Date first informed of impending organ failure - Several months ago  When was the subject of transplantation first broached - months ago with his hepatologist   Pt made the following lifestyle adjustments and how - Has tried rehab, but has relapsed  Does the patient accept/understand the connection between substance use and subsequent organ failure - yes  Date of last use of substances - 2 weeks ago  Understands need for lifetime medication - yes  Understands need for lifetime sobriety - yes  View of AA/NA - ok, but has only minimally engaged in past  Social support - Good, lives with mother who is supportive of him and does not drink herself       PSYCHIATRIC HISTORY  Reported Diagnose(s): Alcohol use disorder  Previous Medication Trials: No  Previous Psychiatric Hospitalizations: No  Outpatient Psychiatrist?: No      SUICIDE/HOMICIDE RISK ASSESSMENT  Current/active suicidal ideation/plan/intent: No  Previous suicide attempts: No  Current/active homicidal ideation/plan/intent: No      HISTORY OF TRAUMA, ABUSE & VIOLENCE  Trauma: No  Physical Abuse: No  Sexual Abuse: No  Violent Conduct: No    Access to Gun: Denies       FAMILY PSYCHIATRIC HISTORY   Denies, states he is only one of family with substance use issues       SOCIAL  HISTORY  Lives with mother  Has 6 kids,  from wife  On disability      PAST MEDICAL HISTORY   CHF, PE, Esophageal Varices, Avascular necrosis of hips  Liver Cirrhosis       PSYCHOSOCIAL FACTORS  Stressors (Psychosocial and Environmental): financial, health and drug and alcohol.       PSYCHIATRIC ROS  Psychiatric Review of Systems  Is the patient experiences or having changes in:  sleep: no  appetite: no  weight: no  energy/anergy: no  interest/pleasure/anhedonia: no  somatic symptoms: no  guilty/hopelessness: no  concentration: no  S.I.B.s/risky behavior: yes  SI/SA:  no     anxiety/panic: no  Agoraphobia:  no  Social phobia:  no  Recurrent nightmares:  no  hyper startle response:  no  Avoidance: no  Recurrent thoughts:  no  Recurrent behaviors:  no     Irritability: no  Racing thoughts: no  Impulsive behaviors: no  Pressured speech:  no     Paranoia:no  Delusions: no  AVH:no      MEDICAL ROS    Complete review of systems performed covering Constitutional, Eyes, ENT/Mouth, Cardiovascular, Respiratory, Gastrointestinal, Genitourinary, Musculoskeletal, Skin, Neurologic, Endocrine, Heme/Lymph, and Allergy/Immune.     Complete review of systems was negative with the exception of the following positive symptoms: Fatigue, Hip pain      ALLERGIES  Ciprofloxacin hcl, Meperidine, Morphine, Nsaids (non-steroidal anti-inflammatory drug), and Tylenol [acetaminophen]      MEDICATIONS    Psychotropics:  None    Infusions:      Scheduled:   albumin human 25%  25 g Intravenous Once    [START ON 7/20/2022] albumin human 25%  25 g Intravenous BID    carvediloL  3.125 mg Oral BID    cefTRIAXone (ROCEPHIN) IVPB  2 g Intravenous Q24H    folic acid  1 mg Oral Daily    lactulose  30 g Oral TID    LIDOcaine  1 patch Transdermal Q24H    octreotide  100 mcg Subcutaneous Q8H    pantoprazole  40 mg Intravenous Daily    rifAXImin  550 mg Oral BID    thiamine  100 mg Oral Daily       PRN:  dextrose 10%, dextrose 10%, glucagon  (human recombinant), glucose, glucose, HYDROmorphone, insulin aspart U-100, lactulose, naloxone, ondansetron, prochlorperazine, simethicone, sodium chloride 0.9%, traMADoL    Home Medications:  Prior to Admission medications    Medication Sig Start Date End Date Taking? Authorizing Provider   carvediloL (COREG) 6.25 MG tablet Take 1 tablet (6.25 mg total) by mouth Daily. 6/30/22   Edouard Gibson MD   folic acid (FOLVITE) 1 MG tablet Take 1 tablet (1 mg total) by mouth once daily. 6/30/22   Edouard Gibson MD   gabapentin (NEURONTIN) 300 MG capsule Take 1 capsule (300 mg total) by mouth every evening for 3 days, THEN 1 capsule (300 mg total) 2 (two) times daily for 3 days, THEN 1 capsule (300 mg total) 3 (three) times daily for 24 days. 6/30/22 7/30/22  Edouard Gibson MD   multivitamin Tab Take 1 tablet by mouth once daily. 5/5/22   Cyrus Gauthier MD   oxyCODONE (ROXICODONE) 5 MG immediate release tablet Take 1 tablet (5 mg total) by mouth every 12 (twelve) hours as needed for Pain. 7/8/22 8/7/22  Robbin De La Garza DO   pantoprazole (PROTONIX) 40 MG tablet Take 1 tablet (40 mg total) by mouth once daily. 7/7/22 7/2/23  Parag Ty MD   potassium chloride SA (K-DUR,KLOR-CON) 20 MEQ tablet Take 1 tablet (20 mEq total) by mouth once daily. 6/30/22   Edouard Gibson MD   rifAXIMin (XIFAXAN) 550 mg Tab Take 1 tablet (550 mg total) by mouth 2 (two) times daily.  Patient not taking: Reported on 7/8/2022 6/27/22   Jacek Arreaga MD   sucralfate (CARAFATE) 1 gram tablet Take 1 g by mouth 4 (four) times daily.    Historical Provider   thiamine 100 MG tablet Take 1 tablet (100 mg total) by mouth once daily. 6/30/22   Edouard Gibson MD   triamcinolone acetonide 0.1% (KENALOG) 0.1 % ointment Apply topically 2 (two) times daily. So not apply to face or anogenital region 6/30/22   Edouard Gibson MD   acamprosate (CAMPRAL) 333 mg tablet Take 666 mg by mouth 3 (three) times daily.  5/4/22   "Historical Provider   furosemide (LASIX) 40 MG tablet Take 0.5 tablets (20 mg total) by mouth once daily. 6/8/21 3/12/22  Jarret Hernandez MD   NIFEdipine (PROCARDIA-XL) 30 MG (OSM) 24 hr tablet Take 1 tablet (30 mg total) by mouth once daily. 3/13/22 5/4/22  Parag Ty MD   spironolactone (ALDACTONE) 50 MG tablet Take 1 tablet (50 mg total) by mouth once daily.  Patient not taking: No sig reported 6/9/21 3/12/22  Jarret Hernandez MD         OBJECTIVE:     EXAMINATION    Vitals:    07/19/22 1103 07/19/22 1104 07/19/22 1136 07/19/22 1141   BP: (!) 160/90 (!) 160/90 (!) 184/104    BP Location:   Left arm    Patient Position:   Sitting    Pulse: 90 90 73    Resp: 18 18 18 18   Temp: 98.3 °F (36.8 °C) 98.3 °F (36.8 °C) 97.7 °F (36.5 °C)    TempSrc:   Oral    SpO2: 98% 98% 100%    Weight:  86.6 kg (191 lb)     Height:  6' (1.829 m)         PAIN   3/10    CONSTITUTIONAL  General Appearance and Manner: Appears fatigued, older than stated age, cooperative    MUSCULOSKELETAL  Abnormal Involuntary Movements: No  Muscle Strength and Tone:  Full  Gait and Station: Normal      PSYCHIATRIC   Orientation: AOx3  Speech: Articulate, no slurring noted, linear  Language: Fluid english  Mood: "good"  Affect: Euthymic  Thought Process: Linear/goal oriented  Associations: No loosening of associations on conversation  Thought Content: No SI, HI, AVH or paranoia  Memory: No recent or remote impairments  Attention and Concentration: Intact to conversation  Fund of Knowledge: Appropriate  Insight: Fair, understands the negative consequences of alcohol on health and can appreciate long term implications  Judgment: Fair, has taken some steps but not fully engaged in the treatment needed to obtain sobriety        ASSESSMENT:     DIAGNOSES & PROBLEMS    Alcohol use disorder, severe      STRENGTHS AND LIABILITIES   Strength: Patient accepts guidance/feedback, Strength: Patient is motivated for change., Strength: Patient has positive " support network.      MOTIVATION TO PURSUE RECOVERY: moderate    ACCEPTANCE OF ADDICTION: good    ABILITY TO ADHERE TO TREATMENT PLAN: moderate      PLAN:       TRANSPLANT SUITABILITY  From a psychiatric perspective, this patient presents as a High risk for transplant.      MANAGEMENT PLAN, TREATMENT GOALS, THERAPEUTIC TECHNIQUES/APPROACHES & CLINICAL REASONING    - Pt 2 weeks from last drink and without signs of alcohol withdrawal currently  - Currently, we consider this patient to be high risk given recent drinking in the last month and relapse despite attending abstinence programs  - He expressed motivation to improve, and willingness to attend an IOP whether it is ours at [facility] or an alternative.  Information was provided to him regarding the different programs he can attend      · Patient counseled on abstinence from alcohol and substances of abuse (illicit and prescription).  · Additional workup planned to address substance use disorder, in order to guide and refine ongoing management options, includes serial alcohol and drug laboratory testing (e.g. PETH, urine toxicology).  · Relapse prevention and motivational interviewing provided.  · Education provided on 12 step recovery programs.      PRESCRIPTION DRUG MANAGEMENT  - The risks and benefits of medication were discussed with this patient.  - Possible expectable adverse effects of any current or proposed individual psychotropic agents were discussed with this patient.  - Counseling was provided on the importance of full compliance with medication regimens.      In cases of emergency, daily coverage provided by Acute/ER Psych MD, NP, or SW, with contact numbers located in [facility] On Call Schedule    Case discussed with staff addiction psychiatrist: MD Andrew NGUYEN M.D.  \[facility] [place] [facility]\""-[facility], PGY-4

## 2022-07-19 NOTE — PLAN OF CARE
Went over POC at beginning of shift.  Questions asked and answered.  Pt remained AAO x 4.  Pt had complaints of pain in left hip that required multiple secure chats to the on call.  Would get very little relief. Pt has been NPO since MN for U/S of liver. Pt remained safe and free from injury.  Will continue to monitor.

## 2022-07-19 NOTE — PLAN OF CARE
[facility] - Telemetry Stepdown  Initial Discharge Assessment       Primary Care Provider: Edouard Gibson MD    Admission Diagnosis: Alcohol withdrawal [F10.239]  Hepatic encephalopathy [K72.90]  Hyperbilirubinemia [E80.6]  Malnutrition [E46]  Increased ammonia level [R79.89]  Chronic left hip pain [M25.552, G89.29]  Chronic diastolic CHF (congestive heart failure) [I50.32]  Alcohol withdrawal syndrome without complication [F10.230]  Altered mental status, unspecified altered mental status type [R41.82]  Malnutrition, unspecified type [E46]    Admission Date: 7/18/2022  Expected Discharge Date: 7/21/2022    Discharge Barriers Identified: Social, Substance Abuse    Payor: MEDICARE / Plan: MEDICARE PART A & B / Product Type: Government /     Extended Emergency Contact Information  Primary Emergency Contact: Irvin Macias   [facility]  Home Phone: [phone]  Work Phone: [phone]  Mobile Phone: [phone]  Relation: Father  Preferred language: English   needed? No    Discharge Plan A: Home with family  Discharge Plan B: [id number] - [place] - [address] [facility] [address]  [address] [address]  Phone: [phone] Fax: [phone]      Initial Assessment (most recent)     Adult Discharge Assessment - 07/19/22 1204        Discharge Assessment    Assessment Type Discharge Planning Brief Assessment     Confirmed/corrected address, phone number and insurance Yes     Confirmed Demographics Correct on Facesheet     Source of Information patient;family   Mother at bedside    When was your last doctors appointment? 07/05/22     Does patient/caregiver understand observation status Yes     Communicated RADHA with patient/caregiver Yes     Reason For Admission ETOH Withdrawl     Lives With parent(s)     Facility Arrived From: Home     Do you expect to return to your current living situation? Yes     Do you  have help at home or someone to help you manage your care at home? Yes     Who are your caregiver(s) and their phone number(s)? [phone]     Prior to hospitilization cognitive status: Alert/Oriented     Current cognitive status: Alert/Oriented     Walking or Climbing Stairs Difficulty ambulation difficulty, requires equipment;stair climbing difficulty, requires equipment;transferring difficulty, requires equipment     Mobility Management Pt uses a cane     Dressing/Bathing Difficulty none     Equipment Currently Used at Home cane, straight     Readmission within 30 days? Yes     Patient currently being followed by outpatient case management? No     Do you currently have service(s) that help you manage your care at home? No     Do you take prescription medications? Yes     Do you have prescription coverage? Yes     Coverage Medicare     Do you have any problems affording any of your prescribed medications? No     Is the patient taking medications as prescribed? yes     Who is going to help you get home at discharge? [phone]     How do you get to doctors appointments? car, drives self;family or friend will provide     Are you on dialysis? No     Do you take coumadin? No     Discharge Plan A Home with family     Discharge Plan B Home Health     DME Needed Upon Discharge  other (see comments)   Unknown at this time    Discharge Plan discussed with: Patient;Parent(s)     Name(s) and Number(s) [phone]     Discharge Barriers Identified Social;Substance Abuse        Relationship/Environment    Name(s) of Who Lives With Patient [phone]                      SW poke to pt and pt mother. Pt lives at home with [phone]. Post hospital stay mother will be pt support person and pt. has transportation at d/c with mother. There have been  hospitalizations within the last 30 days per pt chart. Verified pt PCP and preferred  pharmacy. Pt stated not on Coumadin and is not receiving dialysis. All questions answered regarding case management/ discharge planning , pt verbalized understanding. Discharge booklet with SW contact information given to pt.       Sandy Saucedo LCSW  Case Management/[facility]  [phone]

## 2022-07-19 NOTE — PLAN OF CARE
Patient presents for paracentesis. Patient is actively vomiting upon arrival; states it came on suddenly. No longer nauseated. Otherwise patient is awake and alert, no distress noted, respirations even and unlabored. Allergies reviewed. Waiting for eval and consent.    Vitals:    07/19/22 1405   BP: (!) 175/96   Pulse: 72   Resp: 18   Temp:

## 2022-07-19 NOTE — DISCHARGE INSTRUCTIONS
MENTAL HEALTH/ADDICTIVE DISORDERS  REFERRAL RECOMMENDATIONS      I. AA ([phone]) [email or web address]/ or NA ([phone])    II. [facility] [phone], option 2    III. Other Places for Outpatient Addictive Disorders and Mental Health Treatment in [facility]:    [phone]; [address]    [facility] ([place], [place], [place]; accepts Medicaid, commercial insurance) [phone]    [facility] ([place]) [phone], [address] [facility] [phone]; Crisis [phone] - Call for options A-E:    [facility] [place], [address] [facility] [place] [address]    [facility], [address] [place] [address]    [facility], [address] [place] [address],    [facility], 2nd floor [address] [place] [address]    [facility], [address], [place] [address]    [facility], [address], [place] [address]    [facility] [place], [address] [place] [address], 0-[facility]    [facility] [place]/[place] [place]ESSI [phone]    [facility] (Mental & General), [facility], [phone]    [facility] (Mental & General Health, not only HIV+, 3 [facility] locations) [phone]    [facility], [address] [facility], [address], [phone]     [facility], [address] [place], [place] [address]Debra, [phone], [phone]      IV. Addiction and Mental Health Treatment in Other [facility]:    [facility], [address] [place], [phone]    [facility], [phone], [address] [facility], [address], [phone]    [facility]. [address] [place] [address],  [place]ong Garcia, [phone]    [facility], [address] [facility], [place], [phone]    [facility], [address] [facility], [phone] ([facility])    [facility], [address] [facility], [phone] ([facility])    [facility], [address] [facility], [address], [place], [phone] ([place], [place], and [facility])    [facility], [address], [place], [phone] ([facility])    [facility] Hotline [phone], [phone]    [facility], [address] [facility], [address] [facility], [address], [facility], [address] [facility], [facility], [address] [facility] [facility], [address], [place]    [facility], [address], [place] [phone]    [facility], [address] [facility] [phone]    [facility], [address] Critical access hospital, [phone]    [facility] for [facility], [address] [facility], [phone]      V. Residential Addictive Disorders Treatment (call every day until you get in):    [facility] [address] [facility], [phone]    [facility] (men only) [address] [facility], [phone]    [facility], [facility], men's program [phone], women's program, [phone]    [facility], [address] [facility], [phone]    [facility] (Female only) [address] [place] [address], [phone]    [facility] (IOP, residential, low cost, MCaid) [address], [place], [phone]    [facility] (Men only, [phone]), [address] [facility] [address], [facility] (Pregnant/women with or without children, [phone])    [facility] (Women only), [address] [facility], [phone]    [facility] (men only), [place] [phone]    VI. Inpatient Rehabs (out of area)    [place], [place], [phone]     [facility], [phone]    [facility], [phone]    [facility], [phone]    [place], [place] ([phone])    [place] ([facility]) [phone]        VIII. In case of suicidal thinking, call the COPE LINE [phone] / [phone]

## 2022-07-19 NOTE — CONSULTS
[facility]  Hepatology  Consult Note    Patient Name: Irvin Diaz Jr.  MRN: [id number]  Admission Date: 7/18/2022  Hospital Length of Stay: 1 days  Code Status: Prior   Attending Provider: Chanel Hernández MD   Consulting Provider: Tan Braun MD  Primary Care Physician: Edouard Gibson MD  Principal Problem:<principal problem not specified>    Consults  Subjective:     HPI: Irvin Diaz Jr. is a 46 y.o. male with history of decompensated EtOH cirrhosis (HE), CHF, GERD, HTN who presents for AMS. Was in usual state of health up until a few days ago when mom reported he did not feel like himself and seemed confused. Pt reports he stopped drinking 1-2 weeks ago and had some symptoms of withdrawals at the time but seems to confuse timeline when interviewed. Reports thinking he was coming to hospital today to get a DEXA scan. Denies melena or hematochezia. No fevers, chills, dysuria.         Past Medical History:   Diagnosis Date    Alcoholic cirrhosis of liver     Arthritis     ATN (acute tubular necrosis)     CHF (congestive heart failure)     Diverticulitis 01/2020    Esophageal varices     GERD (gastroesophageal reflux disease)     GI bleed     Hip arthritis     Left    Hypertension     Liver cirrhosis     Macrocytic anemia     Pulmonary embolism 08/2018    Unprovoked DVT.  Stop Coumadin due to GIB    Thrombocytopenia        Past Surgical History:   Procedure Laterality Date    ANKLE SURGERY      BLOCK, NERVE, PERIPHERAL Left 06/24/2022    Procedure: Left Hip femoral-obturator accessory nerve block;  Surgeon: Robbin De La Garza DO;  Location: [facility] OR;  Service: Pain Management;  Laterality: Left;    COLON SURGERY  2007    [facility]  09/05/2019    [facility]    COLONOSCOPY N/A 02/17/2021    Procedure: COLONOSCOPY;  Surgeon: Renea Billingsley MD;  Location[facility];  Service: Endoscopy;  Laterality: N/A;    COLONOSCOPY W/ BIOPSIES  02/17/2021     ESOPHAGOGASTRODUODENOSCOPY N/A 07/26/2019    Procedure: EGD (ESOPHAGOGASTRODUODENOSCOPY);  Surgeon: Brian Trivedi MD;  Location: [facility];  Service: Endoscopy;  Laterality: N/A;    ESOPHAGOGASTRODUODENOSCOPY N/A 04/08/2020    Procedure: EGD (ESOPHAGOGASTRODUODENOSCOPY);  Surgeon: Donn Acuna MD;  Location: [facility];  Service: Endoscopy;  Laterality: N/A;    ESOPHAGOGASTRODUODENOSCOPY N/A 01/03/2021    Procedure: EGD (ESOPHAGOGASTRODUODENOSCOPY)- coffee ground emesis, hx varices;  Surgeon: Steve Chairez MD;  Location: [facility];  Service: Endoscopy;  Laterality: N/A;    ESOPHAGOGASTRODUODENOSCOPY N/A 05/03/2021    Procedure: EGD (ESOPHAGOGASTRODUODENOSCOPY);  Surgeon: Jeanmarie Ngo MD;  Location: [facility];  Service: Endoscopy;  Laterality: N/A;    ESOPHAGOGASTRODUODENOSCOPY N/A 07/19/2021    Procedure: ESOPHAGOGASTRODUODENOSCOPY (EGD);  Surgeon: Claudio Medeiros MD;  Location: [facility];  Service: Endoscopy;  Laterality: N/A;    ESOPHAGOGASTRODUODENOSCOPY  12/20/2021    ESOPHAGOGASTRODUODENOSCOPY N/A 12/20/2021    Procedure: EGD (ESOPHAGOGASTRODUODENOSCOPY);  Surgeon: Ajay Jackson MD;  Location: [facility];  Service: Endoscopy;  Laterality: N/A;    ESOPHAGOGASTRODUODENOSCOPY N/A 05/03/2022    Procedure: EGD (ESOPHAGOGASTRODUODENOSCOPY);  Surgeon: Brian Trivedi MD;  Location: [facility];  Service: Endoscopy;  Laterality: N/A;    ESOPHAGOGASTRODUODENOSCOPY N/A 7/7/2022    Procedure: EGD (ESOPHAGOGASTRODUODENOSCOPY);  Surgeon: Ajay Jackson MD;  Location: [facility];  Service: Endoscopy;  Laterality: N/A;    HERNIA REPAIR Left     Inguinal    UPPER GASTROINTESTINAL ENDOSCOPY N/A 07/07/2022       Family History   Problem Relation Age of Onset    Hypertension Mother     Breast cancer Mother     Hypertension Father     Prostate cancer Father     Bladder Cancer Father        Social History     Socioeconomic History    Marital status: Legally    Tobacco Use     Smoking status: Former Smoker     Types: Cigarettes     Quit date:      Years since quittin.5    Smokeless tobacco: Never Used    Tobacco comment: quit 20 years ago   Substance and Sexual Activity    Alcohol use: Not Currently     Comment: freq    Drug use: Yes     Types: Marijuana     Comment: occasionally    Sexual activity: Yes     Comment: occ     Social Determinants of Health     Financial Resource Strain: Low Risk     Difficulty of Paying Living Expenses: Not very hard   Food Insecurity: No Food Insecurity    Worried About Running Out of Food in the Last Year: Never true    Ran Out of Food in the Last Year: Never true   Transportation Needs: No Transportation Needs    Lack of Transportation (Medical): No    Lack of Transportation (Non-Medical): No   Physical Activity: Inactive    Days of Exercise per Week: 0 days    Minutes of Exercise per Session: 0 min   Stress: No Stress Concern Present    Feeling of Stress : Not at all   Social Connections: Socially Isolated    Frequency of Communication with Friends and Family: Three times a week    Frequency of Social Gatherings with Friends and Family: Three times a week    Attends Pentecostalism Services: Never    Active Member of Clubs or Organizations: No    Attends Club or Organization Meetings: Never    Marital Status:    Housing Stability: Low Risk     Unable to Pay for Housing in the Last Year: No    Number of Places Lived in the Last Year: 1    Unstable Housing in the Last Year: No       No current facility-administered medications on file prior to encounter.     Current Outpatient Medications on File Prior to Encounter   Medication Sig Dispense Refill    carvediloL (COREG) 6.25 MG tablet Take 1 tablet (6.25 mg total) by mouth Daily. 30 tablet 5    diphenhydrAMINE (BENADRYL) 25 mg capsule Take 25 mg by mouth 2 (two) times daily as needed for Itching.      folic acid (FOLVITE) 1 MG tablet Take 1 tablet (1 mg total) by mouth  once daily. 30 tablet 5    gabapentin (NEURONTIN) 300 MG capsule Take 1 capsule (300 mg total) by mouth every evening for 3 days, THEN 1 capsule (300 mg total) 2 (two) times daily for 3 days, THEN 1 capsule (300 mg total) 3 (three) times daily for 24 days. (Patient taking differently: Take 1 capsule by mouth three times daily) 81 capsule 0    pantoprazole (PROTONIX) 40 MG tablet Take 1 tablet (40 mg total) by mouth once daily. (Patient taking differently: Take 40 mg by mouth 2 (two) times daily.) 30 tablet 11    potassium chloride SA (K-DUR,KLOR-CON) 20 MEQ tablet Take 1 tablet (20 mEq total) by mouth once daily. 30 tablet 5    sucralfate (CARAFATE) 1 gram tablet Take 1 g by mouth 4 (four) times daily.      thiamine 100 MG tablet Take 1 tablet (100 mg total) by mouth once daily. 30 tablet 5    traMADoL (ULTRAM) 50 mg tablet Take 50 mg by mouth every 12 (twelve) hours as needed for Pain.      triamcinolone acetonide 0.1% (KENALOG) 0.1 % ointment Apply topically 2 (two) times daily. So not apply to face or anogenital region (Patient taking differently: Apply topically 2 (two) times daily as needed (itching). do not apply to face or anogenital region) 30 g 1    rifAXIMin (XIFAXAN) 550 mg Tab Take 1 tablet (550 mg total) by mouth 2 (two) times daily. (Patient not taking: Reported on 7/8/2022) 60 tablet 6    [DISCONTINUED] acamprosate (CAMPRAL) 333 mg tablet Take 666 mg by mouth 3 (three) times daily.      [DISCONTINUED] furosemide (LASIX) 40 MG tablet Take 0.5 tablets (20 mg total) by mouth once daily. 30 tablet 1    [DISCONTINUED] multivitamin Tab Take 1 tablet by mouth once daily. 30 tablet 0    [DISCONTINUED] NIFEdipine (PROCARDIA-XL) 30 MG (OSM) 24 hr tablet Take 1 tablet (30 mg total) by mouth once daily. 30 tablet 11    [DISCONTINUED] oxyCODONE (ROXICODONE) 5 MG immediate release tablet Take 1 tablet (5 mg total) by mouth every 12 (twelve) hours as needed for Pain. 60 tablet 0    [DISCONTINUED]  spironolactone (ALDACTONE) 50 MG tablet Take 1 tablet (50 mg total) by mouth once daily. (Patient not taking: No sig reported) 30 tablet 11       Review of patient's allergies indicates:   Allergen Reactions    Ciprofloxacin hcl Hallucinations    Meperidine Hives     Pt medicated w/multiple medications (demerol, protonix, and zofran)  w/i 10 mins time frame prior to developing localized hives near IV site that med was administered. Pt reports he has/takes all other medications on daily basis.     Morphine Itching    Nsaids (non-steroidal anti-inflammatory drug)      Kidney Disease    Tylenol [acetaminophen]      Hx of liver disease       ROS     Objective:     Vitals:    07/19/22 1405   BP: (!) 175/96   Pulse: 72   Resp: 18   Temp:          Constitutional:  not in acute distress and well developed  HENT: Head: Normal, normocephalic, atraumatic.  Eyes: conjunctiva clear and sclera icteric  Cardiovascular: regular rate and rhythm and no murmur  Respiratory: normal chest expansion & respiratory effort   and no accessory muscle use  GI: soft, non-tender, without masses or organomegaly  Musculoskeletal: no muscular tenderness noted  Skin: normal color  Neurological: alert, oriented x3  Psychiatric: mood and affect are within normal limits, pt is a good historian; no memory problems were noted    Significant Labs:  Recent Labs   Lab 07/18/22  1353 07/19/22  0404   HGB 8.1* 7.5*       Lab Results   Component Value Date    WBC 7.26 07/19/2022    HGB 7.5 (L) 07/19/2022    HCT 23.2 (L) 07/19/2022     (H) 07/19/2022    PLT 66 (L) 07/19/2022       Lab Results   Component Value Date     07/19/2022    K 3.7 07/19/2022     07/19/2022    CO2 19 (L) 07/19/2022    BUN 17 07/19/2022    CREATININE 2.1 (H) 07/19/2022    CALCIUM 8.8 07/19/2022    ANIONGAP 9 07/19/2022    ESTGFRAFRICA 42.1 (A) 07/19/2022    EGFRNONAA 36.4 (A) 07/19/2022       Lab Results   Component Value Date    ALT 18 07/19/2022    AST 47 (H)  07/19/2022    ALKPHOS 69 07/19/2022    BILITOT 6.2 (H) 07/19/2022       Lab Results   Component Value Date    INR 1.6 (H) 07/19/2022    INR 1.5 (H) 07/18/2022    INR 1.4 (H) 06/26/2022       Significant Imaging:  Reviewed pertinent radiology findings.       Assessment/Plan:     Irvin Diaz Jr. is a 46 y.o. male with history of decompensated EtOH cirrhosis (HE), CHF, GERD, HTN who presents for AMS concerning for HE. Symptomatically improved with lactulose overnight. Noted to have new hyperbilirubinemia as well, US negative for mass or PVT. ENA evident as well, would benefit from nephrology input. MELD noted to be 25 on admission. Would benefit from addiction psych and SW consult to assist with transplant evaluation process but otherwise can continue outpatient.    Problem List:  1. Hepatic encephalopathy, improved  2. Jaundice  3. ENA on CKD        Recommendations:  - Addiction psychiatry and SW consults  - Noted recent elevated PETH after outpatient hepatology appt  - Will defer further transplant eval workup to outpatient at this time  - Continue lactulose, titrate to 2-3 BMs per day  - Agree with diagnostic paracentesis if able  - Empiric ceftriaxone for SBP coverage at this time  - Would obtain nephrology consult to evaluate ENA vs pre-existing CKD    Thank you for involving us in the care of Irvin Diaz Jr.. Please call with any additional questions, concerns or changes in the patient's clinical status. We will continue to follow.     Tan Braun MD  Gastroenterology Fellow PGY IV  [facility]

## 2022-07-19 NOTE — PLAN OF CARE
Provider assessed patient and found no fluid for paracentesis and/or labs. Patient alert and oriented, no distress noted, respirations even and unlabored. Patient stable for transfer back to floor. Report called to Davy BERG.

## 2022-07-20 ENCOUNTER — TELEPHONE (OUTPATIENT)
Dept: TRANSPLANT | Facility: CLINIC | Age: 47
End: 2022-07-20
Payer: MEDICARE

## 2022-07-20 VITALS
RESPIRATION RATE: 18 BRPM | TEMPERATURE: 99 F | HEART RATE: 76 BPM | WEIGHT: 191 LBS | HEIGHT: 72 IN | SYSTOLIC BLOOD PRESSURE: 126 MMHG | DIASTOLIC BLOOD PRESSURE: 63 MMHG | OXYGEN SATURATION: 97 % | BODY MASS INDEX: 25.87 KG/M2

## 2022-07-20 DIAGNOSIS — N18.1 CKD (CHRONIC KIDNEY DISEASE) STAGE 1, GFR 90 ML/MIN OR GREATER: Primary | ICD-10-CM

## 2022-07-20 LAB
ALBUMIN SERPL BCP-MCNC: 2.9 G/DL (ref 3.5–5.2)
ALP SERPL-CCNC: 67 U/L (ref 55–135)
ALT SERPL W/O P-5'-P-CCNC: 20 U/L (ref 10–44)
ANION GAP SERPL CALC-SCNC: 10 MMOL/L (ref 8–16)
ANISOCYTOSIS BLD QL SMEAR: SLIGHT
AST SERPL-CCNC: 47 U/L (ref 10–40)
BASOPHILS # BLD AUTO: 0.03 K/UL (ref 0–0.2)
BASOPHILS NFR BLD: 0.3 % (ref 0–1.9)
BILIRUB SERPL-MCNC: 6.2 MG/DL (ref 0.1–1)
BUN SERPL-MCNC: 15 MG/DL (ref 6–20)
CALCIUM SERPL-MCNC: 9.2 MG/DL (ref 8.7–10.5)
CHLORIDE SERPL-SCNC: 105 MMOL/L (ref 95–110)
CO2 SERPL-SCNC: 17 MMOL/L (ref 23–29)
CREAT SERPL-MCNC: 2.1 MG/DL (ref 0.5–1.4)
DIFFERENTIAL METHOD: ABNORMAL
EOSINOPHIL # BLD AUTO: 0.2 K/UL (ref 0–0.5)
EOSINOPHIL NFR BLD: 1.4 % (ref 0–8)
ERYTHROCYTE [DISTWIDTH] IN BLOOD BY AUTOMATED COUNT: 21.2 % (ref 11.5–14.5)
EST. GFR  (AFRICAN AMERICAN): 42.1 ML/MIN/1.73 M^2
EST. GFR  (NON AFRICAN AMERICAN): 36.4 ML/MIN/1.73 M^2
GLUCOSE SERPL-MCNC: 149 MG/DL (ref 70–110)
HCT VFR BLD AUTO: 23 % (ref 40–54)
HGB BLD-MCNC: 7.3 G/DL (ref 14–18)
IMM GRANULOCYTES # BLD AUTO: 0.05 K/UL (ref 0–0.04)
IMM GRANULOCYTES NFR BLD AUTO: 0.4 % (ref 0–0.5)
INR PPP: 1.6 (ref 0.8–1.2)
LYMPHOCYTES # BLD AUTO: 1.1 K/UL (ref 1–4.8)
LYMPHOCYTES NFR BLD: 9.3 % (ref 18–48)
MCH RBC QN AUTO: 34.3 PG (ref 27–31)
MCHC RBC AUTO-ENTMCNC: 31.7 G/DL (ref 32–36)
MCV RBC AUTO: 108 FL (ref 82–98)
MONOCYTES # BLD AUTO: 0.5 K/UL (ref 0.3–1)
MONOCYTES NFR BLD: 4.1 % (ref 4–15)
NEUTROPHILS # BLD AUTO: 9.6 K/UL (ref 1.8–7.7)
NEUTROPHILS NFR BLD: 84.5 % (ref 38–73)
NRBC BLD-RTO: 0 /100 WBC
PLATELET # BLD AUTO: 64 K/UL (ref 150–450)
PLATELET BLD QL SMEAR: ABNORMAL
PMV BLD AUTO: 11.1 FL (ref 9.2–12.9)
POIKILOCYTOSIS BLD QL SMEAR: SLIGHT
POTASSIUM SERPL-SCNC: 4 MMOL/L (ref 3.5–5.1)
PROT SERPL-MCNC: 8.4 G/DL (ref 6–8.4)
PROTHROMBIN TIME: 16.2 SEC (ref 9–12.5)
RBC # BLD AUTO: 2.13 M/UL (ref 4.6–6.2)
SODIUM SERPL-SCNC: 132 MMOL/L (ref 136–145)
WBC # BLD AUTO: 11.32 K/UL (ref 3.9–12.7)

## 2022-07-20 PROCEDURE — P9047 ALBUMIN (HUMAN), 25%, 50ML: HCPCS | Mod: JG,NTX | Performed by: HOSPITALIST

## 2022-07-20 PROCEDURE — 63600175 PHARM REV CODE 636 W HCPCS: Mod: JG,NTX | Performed by: HOSPITALIST

## 2022-07-20 PROCEDURE — 36415 COLL VENOUS BLD VENIPUNCTURE: CPT | Mod: NTX | Performed by: HOSPITALIST

## 2022-07-20 PROCEDURE — 99239 HOSP IP/OBS DSCHRG MGMT >30: CPT | Mod: NTX,,, | Performed by: HOSPITALIST

## 2022-07-20 PROCEDURE — 85610 PROTHROMBIN TIME: CPT | Mod: NTX | Performed by: HOSPITALIST

## 2022-07-20 PROCEDURE — 85025 COMPLETE CBC W/AUTO DIFF WBC: CPT | Mod: NTX | Performed by: HOSPITALIST

## 2022-07-20 PROCEDURE — C9113 INJ PANTOPRAZOLE SODIUM, VIA: HCPCS | Mod: NTX | Performed by: HOSPITALIST

## 2022-07-20 PROCEDURE — 25000003 PHARM REV CODE 250: Mod: NTX | Performed by: INTERNAL MEDICINE

## 2022-07-20 PROCEDURE — 25000003 PHARM REV CODE 250: Mod: NTX | Performed by: HOSPITALIST

## 2022-07-20 PROCEDURE — 99239 PR HOSPITAL DISCHARGE DAY,>30 MIN: ICD-10-PCS | Mod: NTX,,, | Performed by: HOSPITALIST

## 2022-07-20 PROCEDURE — 80053 COMPREHEN METABOLIC PANEL: CPT | Mod: NTX | Performed by: HOSPITALIST

## 2022-07-20 RX ORDER — ACETAMINOPHEN 325 MG/1
650 TABLET ORAL ONCE
Status: COMPLETED | OUTPATIENT
Start: 2022-07-20 | End: 2022-07-20

## 2022-07-20 RX ORDER — LORAZEPAM 0.5 MG/1
0.5 TABLET ORAL EVERY 6 HOURS PRN
Qty: 5 TABLET | Refills: 0 | Status: SHIPPED | OUTPATIENT
Start: 2022-07-20 | End: 2022-09-07

## 2022-07-20 RX ORDER — PANTOPRAZOLE SODIUM 40 MG/1
40 TABLET, DELAYED RELEASE ORAL DAILY
Status: DISCONTINUED | OUTPATIENT
Start: 2022-07-21 | End: 2022-07-20 | Stop reason: HOSPADM

## 2022-07-20 RX ORDER — LACTULOSE 10 G/15ML
30 SOLUTION ORAL; RECTAL 3 TIMES DAILY
Qty: 4050 ML | Refills: 0 | Status: SHIPPED | OUTPATIENT
Start: 2022-07-20 | End: 2022-08-19

## 2022-07-20 RX ADMIN — HYDROMORPHONE HYDROCHLORIDE 2 MG: 2 TABLET ORAL at 08:07

## 2022-07-20 RX ADMIN — PANTOPRAZOLE SODIUM 40 MG: 40 INJECTION, POWDER, FOR SOLUTION INTRAVENOUS at 08:07

## 2022-07-20 RX ADMIN — OCTREOTIDE ACETATE 100 MCG: 100 INJECTION, SOLUTION INTRAVENOUS; SUBCUTANEOUS at 05:07

## 2022-07-20 RX ADMIN — LACTULOSE 30 G: 20 SOLUTION ORAL at 08:07

## 2022-07-20 RX ADMIN — ALBUMIN (HUMAN) 25 G: 12.5 SOLUTION INTRAVENOUS at 09:07

## 2022-07-20 RX ADMIN — THIAMINE HCL TAB 100 MG 100 MG: 100 TAB at 08:07

## 2022-07-20 RX ADMIN — LIDOCAINE 1 PATCH: 50 PATCH CUTANEOUS at 03:07

## 2022-07-20 RX ADMIN — CARVEDILOL 3.12 MG: 3.12 TABLET, FILM COATED ORAL at 08:07

## 2022-07-20 RX ADMIN — RIFAXIMIN 550 MG: 550 TABLET ORAL at 08:07

## 2022-07-20 RX ADMIN — ACETAMINOPHEN 650 MG: 325 TABLET ORAL at 05:07

## 2022-07-20 RX ADMIN — FOLIC ACID 1 MG: 1 TABLET ORAL at 08:07

## 2022-07-20 NOTE — PLAN OF CARE
[facility] - Telemetry Stepdown  Discharge Final Note    Primary Care Provider: Edouard Gibson MD    Expected Discharge Date: 7/20/2022       Future Appointments   Date Time Provider Department Center   7/21/2022  7:30 AM LAB, TRANSPLANT [facility] LABTX [facility]   7/21/2022  8:00 AM MTM, ABDOMINAL TRANSPLANT [facility] KIDNTX [facility]   7/21/2022  9:00 AM PRE-LIVER, TRANSPLANT NURSE EDUCATION [facility] LIVERTX [facility]   7/21/2022 10:15 AM [facility] XROP3 485 LB LIMIT [facility] XRAY OP [facility]   7/21/2022  1:00 PM LIVER, TRANSPLANT [facility] LIVERTX [facility]   7/21/2022  4:15 PM [facility] US TRANSPLANT ONLY [facility] ULTR [facility]   7/22/2022 11:30 AM LAB, [facility] [place] [facility]   7/22/2022 12:30 PM NUCLEAR [place]ER[place], [facility] NUCC[facility]   7/22/2022 12:35 PM NUCLEAR CAMERA, [facility]   7/22/2022 12:40 PM NUCLEAR CAMERA, [facility]   7/26/2022  2:40 PM Wade Whaley MD [facility]   8/2/2022  1:00 PM Robbin De La Garza DO [facility] [place]   8/2/2022  4:00 PM Floridalma Veronica MD [facility] HEPAT [facility]   9/6/2022 11:45 AM Robbin De La Garza DO [facility] [place]   12/30/2022 10:30 AM Edouard Gibson MD [facility]           Final Discharge Note (most recent)     Final Note - 07/20/22 1406        Final Note    Assessment Type Final Discharge Note     Anticipated Discharge Disposition Home or Self Care     What phone number can be called within the next 1-3 days to see how you are doing after discharge? [phone]     Hospital Resources/Appts/Education Provided Appointments scheduled and added to AVS   Ambulatory referral sent to Nephrology.       Post-Acute Status    Post-Acute Authorization Other     Other Status No Post-Acute Service Needs     Discharge Delays None known at this time                 Important Message from Medicare             Contact Info     Edouard Gibson MD   Specialty: Family Medicine, Hospitalist   Relationship: PCP -  General    [address] [place] [address]   Phone: [phone]       Next Steps: Go on 7/26/2022    Instructions: Appointment time: 2:40PM    [facility] 5th Fl   Specialty: Nephrology    [address]  [place] [address]   Phone: [phone]       Next Steps: Schedule an appointment as soon as possible for a visit          Leonarda Pearce RN  Ext [phone]

## 2022-07-20 NOTE — CARE UPDATE
[facility] scheduled pt's hospital f/u visit on 7/26/22 @ 2:40pm and Hepatology 8/2/22 @ 4:00pm. Nephrology will make contact with pt to schedule his f/u due to appointments being far out past September.

## 2022-07-20 NOTE — NURSING
Patient discharged home with all belongings. Discharge instructions reviewed with patient-verbalized understanding. Medications delivered to bedside.

## 2022-07-20 NOTE — TELEPHONE ENCOUNTER
Patient called to confirm that he  will be attending the scheduled appointments for Liver Transplant Fast Pass Evaluation scheduled to start 7/21/22  at 0730.  Patient confirms that caregivers will be present for the scheduled appointments.  Patient appointments reviewed along with location and special instructions.  Patient questions answered at this time and number provided to call the office if there is any problem.

## 2022-07-21 ENCOUNTER — CLINICAL SUPPORT (OUTPATIENT)
Dept: TRANSPLANT | Facility: CLINIC | Age: 47
End: 2022-07-21
Payer: MEDICARE

## 2022-07-21 ENCOUNTER — TELEPHONE (OUTPATIENT)
Dept: PRIMARY CARE CLINIC | Facility: CLINIC | Age: 47
End: 2022-07-21
Payer: MEDICARE

## 2022-07-21 ENCOUNTER — HOSPITAL ENCOUNTER (OUTPATIENT)
Dept: RADIOLOGY | Facility: HOSPITAL | Age: 47
Discharge: HOME | End: 2022-07-21
Attending: INTERNAL MEDICINE
Payer: MEDICARE

## 2022-07-21 ENCOUNTER — EVALUATION (OUTPATIENT)
Dept: TRANSPLANT | Facility: CLINIC | Age: 47
End: 2022-07-21
Payer: MEDICARE

## 2022-07-21 VITALS
HEIGHT: 70 IN | DIASTOLIC BLOOD PRESSURE: 81 MMHG | HEART RATE: 84 BPM | WEIGHT: 185.44 LBS | DIASTOLIC BLOOD PRESSURE: 81 MMHG | RESPIRATION RATE: 16 BRPM | BODY MASS INDEX: 26.55 KG/M2 | TEMPERATURE: 97 F | OXYGEN SATURATION: 100 % | BODY MASS INDEX: 26.55 KG/M2 | RESPIRATION RATE: 16 BRPM | TEMPERATURE: 97 F | TEMPERATURE: 97 F | HEART RATE: 84 BPM | SYSTOLIC BLOOD PRESSURE: 163 MMHG | RESPIRATION RATE: 16 BRPM | OXYGEN SATURATION: 100 % | WEIGHT: 185.44 LBS | BODY MASS INDEX: 26.55 KG/M2 | HEART RATE: 84 BPM | SYSTOLIC BLOOD PRESSURE: 163 MMHG | DIASTOLIC BLOOD PRESSURE: 81 MMHG | SYSTOLIC BLOOD PRESSURE: 163 MMHG | HEIGHT: 70 IN | HEIGHT: 70 IN | OXYGEN SATURATION: 100 % | WEIGHT: 185.44 LBS

## 2022-07-21 DIAGNOSIS — Z76.82 ORGAN TRANSPLANT CANDIDATE: ICD-10-CM

## 2022-07-21 DIAGNOSIS — Z01.818 PRE-TRANSPLANT EVALUATION FOR LIVER TRANSPLANT: Primary | ICD-10-CM

## 2022-07-21 LAB
AMPHET+METHAMPHET UR QL: NEGATIVE
BARBITURATES UR QL SCN>200 NG/ML: NEGATIVE
BENZODIAZ UR QL SCN>200 NG/ML: NEGATIVE
BILIRUB UR QL STRIP: NEGATIVE
BZE UR QL SCN: NEGATIVE
CANNABINOIDS UR QL SCN: ABNORMAL
CLARITY UR REFRACT.AUTO: CLEAR
COLOR UR AUTO: ABNORMAL
CREAT UR-MCNC: 293 MG/DL (ref 23–375)
ETHANOL UR-MCNC: <10 MG/DL
GLUCOSE UR QL STRIP: NEGATIVE
HGB UR QL STRIP: ABNORMAL
KETONES UR QL STRIP: NEGATIVE
LEUKOCYTE ESTERASE UR QL STRIP: NEGATIVE
METHADONE UR QL SCN>300 NG/ML: NEGATIVE
MICROSCOPIC COMMENT: NORMAL
NITRITE UR QL STRIP: NEGATIVE
OPIATES UR QL SCN: NEGATIVE
PCP UR QL SCN>25 NG/ML: NEGATIVE
PETH 16:0/18.1 (POPETH): 135 NG/ML
PETH 16:0/18.2 (PLPETH): 60 NG/ML
PH UR STRIP: 5 [PH] (ref 5–8)
PROT UR QL STRIP: NEGATIVE
RBC #/AREA URNS AUTO: 1 /HPF (ref 0–4)
SP GR UR STRIP: 1.02 (ref 1–1.03)
SQUAMOUS #/AREA URNS AUTO: 0 /HPF
TOXICOLOGY INFORMATION: ABNORMAL
URN SPEC COLLECT METH UR: ABNORMAL
WBC #/AREA URNS AUTO: 2 /HPF (ref 0–5)

## 2022-07-21 PROCEDURE — 74160 CT ABDOMEN W/CONTRAST: CPT | Mod: TC,TXP

## 2022-07-21 PROCEDURE — 99205 PR OFFICE/OUTPT VISIT, NEW, LEVL V, 60-74 MIN: ICD-10-PCS | Mod: S$PBB,TXP,, | Performed by: TRANSPLANT SURGERY

## 2022-07-21 PROCEDURE — 81001 URINALYSIS AUTO W/SCOPE: CPT | Mod: TXP | Performed by: INTERNAL MEDICINE

## 2022-07-21 PROCEDURE — 25500020 PHARM REV CODE 255: Mod: TXP | Performed by: INTERNAL MEDICINE

## 2022-07-21 PROCEDURE — 99999 PR PBB SHADOW E&M-EST. PATIENT-LVL III: ICD-10-PCS | Mod: PBBFAC,TXP,,

## 2022-07-21 PROCEDURE — 99213 OFFICE O/P EST LOW 20 MIN: CPT | Mod: PBBFAC,TXP,25

## 2022-07-21 PROCEDURE — 99213 OFFICE O/P EST LOW 20 MIN: CPT | Mod: PBBFAC,25,27,TXP

## 2022-07-21 PROCEDURE — 71046 X-RAY EXAM CHEST 2 VIEWS: CPT | Mod: 26,TXP,, | Performed by: RADIOLOGY

## 2022-07-21 PROCEDURE — 74160 CT ABDOMEN W/CONTRAST: CPT | Mod: 26,TXP,, | Performed by: RADIOLOGY

## 2022-07-21 PROCEDURE — 99999 PR PBB SHADOW E&M-EST. PATIENT-LVL III: CPT | Mod: PBBFAC,TXP,,

## 2022-07-21 PROCEDURE — 71046 XR CHEST PA AND LATERAL: ICD-10-PCS | Mod: 26,TXP,, | Performed by: RADIOLOGY

## 2022-07-21 PROCEDURE — 99999 PR PBB SHADOW E&M-EST. PATIENT-LVL III: ICD-10-PCS | Mod: PBBFAC,TXP,, | Performed by: TRANSPLANT SURGERY

## 2022-07-21 PROCEDURE — 74160 CT ABDOMEN WITH CONTRAST: ICD-10-PCS | Mod: 26,TXP,, | Performed by: RADIOLOGY

## 2022-07-21 PROCEDURE — 99205 OFFICE O/P NEW HI 60 MIN: CPT | Mod: S$PBB,TXP,, | Performed by: TRANSPLANT SURGERY

## 2022-07-21 PROCEDURE — 71046 X-RAY EXAM CHEST 2 VIEWS: CPT | Mod: TC,FY,TXP

## 2022-07-21 PROCEDURE — 97802 MEDICAL NUTRITION INDIV IN: CPT | Mod: PBBFAC,TXP | Performed by: DIETITIAN, REGISTERED

## 2022-07-21 PROCEDURE — 99213 OFFICE O/P EST LOW 20 MIN: CPT | Mod: PBBFAC,25,27,TXP | Performed by: TRANSPLANT SURGERY

## 2022-07-21 PROCEDURE — 99999 PR PBB SHADOW E&M-EST. PATIENT-LVL III: CPT | Mod: PBBFAC,TXP,, | Performed by: TRANSPLANT SURGERY

## 2022-07-21 PROCEDURE — 80307 DRUG TEST PRSMV CHEM ANLYZR: CPT | Mod: TXP | Performed by: INTERNAL MEDICINE

## 2022-07-21 RX ORDER — OXYCODONE HYDROCHLORIDE 5 MG/1
5 TABLET ORAL EVERY 4 HOURS PRN
COMMUNITY
End: 2022-08-02

## 2022-07-21 RX ADMIN — IOHEXOL 100 ML: 350 INJECTION, SOLUTION INTRAVENOUS at 06:07

## 2022-07-21 NOTE — PROGRESS NOTES
Transplant Surgery  Liver Transplant Recipient Evaluation    Referring Provider: Brian Lamas    Subjective:     Reason for Visit: evaluation for liver transplant    History of Present Illness: Irvin Diaz Jr. is a 46 y.o. male who is being evaluated for liver transplant due to Alcoholic Cirrhosis. Irvin reports esophageal or gastric varices, fatigue, hyponatremia, portal hypertension and thrombocytopenia.    External provider notes reviewed: Yes    Review of Systems   Constitutional: Positive for activity change, appetite change and fatigue.   Respiratory: Negative.    Cardiovascular: Negative.    Gastrointestinal: Negative.    Genitourinary: Negative.      Objective:     Physical Exam  Constitutional:       Appearance: Normal appearance.   HENT:      Head: Normocephalic.   Eyes:      Conjunctiva/sclera: Conjunctivae normal.   Cardiovascular:      Rate and Rhythm: Normal rate and regular rhythm.      Pulses: Normal pulses.      Heart sounds: Normal heart sounds.   Pulmonary:      Effort: Pulmonary effort is normal.      Breath sounds: Normal breath sounds.   Abdominal:      Palpations: Abdomen is soft.      Tenderness: There is no abdominal tenderness. There is no guarding.      Hernia: No hernia is present.          Comments: Transverse incision at level of umbilicus that patient indicates was for colon resection   Skin:     General: Skin is warm and dry.   Neurological:      Mental Status: He is alert. Mental status is at baseline.   Psychiatric:         Mood and Affect: Mood normal.         Behavior: Behavior normal.         MELD-Na score: 28 at 7/21/2022  7:56 AM  MELD score: 26 at 7/21/2022  7:56 AM  Calculated from:  Serum Creatinine: 2.2 mg/dL at 7/21/2022  7:56 AM  Serum Sodium: 133 mmol/L at 7/21/2022  7:56 AM  Total Bilirubin: 8.4 mg/dL at 7/21/2022  7:56 AM  INR(ratio): 1.4 at 7/21/2022  7:56 AM  Age: 46 years    Diagnostics:  The following labs have been reviewed:  CBC  CMP  PT  INR  The following radiology images have been independently reviewed and interpreted: Liver US  CT Abd/Pelvis    Diagnoses:  1. Organ transplant candidate        Transplant Surgery - Candidacy   Assessment/Plan:     Transplant Candidacy: Irvin Diaz Jr. is a 46 y.o. male with ESLD secondary to Alcoholic Cirrhosis here for evaluation for possible OLT.  Based on available information, Irvin is a suitable liver transplant candidate from a surgical standpoint.  Social work evaluation and recommendations pending in setting of recurrent ETOH abuse.  He will be presented to selection committee after all tests and evaluations are complete.      Abdominal/Body Habitus: No concerns    Ascites: None    SBP History: None reported    Portal vein: No evidence of PVT on US (7/19/22) or cross sectional imaging (7/22/22)    Biliary/Pancreas: No history of biliary disease or pancreatitis.     Abdominal Surgery: 2007 unspecified colon resection reportedly for obstruction (transverse incision)     Other surgical considerations:  Delayed systolic upstroke of main hepatic artery and right hepatic arteries on US, but no significant arterial disease appreciated on CT. Replaced left hepatic artery    Medical Considerations:  Renal dysfunction, consider intraop HD.  History of unprovoked DVT/PE treated with anticoagulation that has since been discontinued.     Additional testing to be completed according to Liver : Written Order Guideline for Adult Liver Transplant Evaluation (LI-02)    Interpretation of tests and discussion of patient management involves all members of the multidisciplinary transplant team.    Lv Farmer MD           [facility] Patient Status  Functional Status: 50% - Requires considerable assistance and frequent medical care  Physical Capacity: No Limitations    Counseling: I discussed with Irvin the benefits of liver transplantation.  We discussed the evaluation and listing procedures.  We discussed  the MELD system and the associated waiting times.  We discussed national and center specific survival results.  We discussed the option of being multiply listed in different OPOs.  We discussed the option of living donation versus  donor transplantation and the advantages and relative disadvantages of each.   We discussed the risks, benefits and potential complications related to surgery including the risks related to anesthesia, bleeding, infection, primary non-function of the allograft, the risk of reoperation as well as the risk of death.  We discussed the typical post-operative course, length of hospitalization, the long-term use of immunosuppressive therapy as well as the need for long-term routine follow-up.    Patient advised that it is recommended that all transplant candidates, and their close contacts and household members receive Covid vaccination.    Coronavirus disease (COVID-19) caused by severe acute respiratory virus coronavirus 2 (SARS-C0V 2) is associated with increased mortality in solid organ transplant recipients (SOT) compared to non-transplant patients. Vaccine responses to vaccination are depressed against SARS-CoV2 compared to normal individuals but improve with third vaccination doses. Vaccination prior to SOT provides both the best opportunity for transplant candidates to develop protective immunity and to reduce the risk of serious COVID19 infections post transplantation. Organ transplant candidates at [facility] will be required to receive SARS-CoV-2 vaccination prior to being listed with a an active status, whenever possible. Exceptions will be made for disability related reasons or for sincerely held Tenriism beliefs.     PHS: I discussed the use of organs from donors with PHS risk criteria, including the testing protocols utilized, as well as data from the literature regarding the likelihood of transmission of hepatitis or HIV.  The  patient is willing to consider such grafts.  DCD: I discussed the use of organs recovered by donation after cardiac death (DCD), including slightly decreased graft survival and greater risk of arterial and biliary complications. The potential advantage to the recipient is possibly receiving a transplant sooner by accepting such an organ. The patient is willing to consider such grafts.  HBcAb: I discussed the use of organs from donors with HBcAb in conjunction with long term use of HBV antiviral drugs, such as lamivudine. The small but measurable risk of hepatitis B seroconversion was discussed as well as the potentially life long need to continue antiviral drugs. The patient is willing to consider such grafts.  HCV Non-viremic recipient: I discussed the use of HCV-positive organs in naive recipients, including the risk of viral transmission to the patients or others, potential insurance barriers for antiviral medication coverage, risk for fibrosing cholestatic hepatitis, death or graft loss. The potential advantage to the recipient is the possibility of receiving a transplant sooner with decreased mortality risk by accepting such an organ. The patient is willing to consider such grafts.  LDLT: I discussed the nature of living donor liver transplant, including donor risks and more frequent recipient complications. The patient is willing to consider such grafts.

## 2022-07-21 NOTE — PROGRESS NOTES
Patient seen in clinic with caregiver.  Patient's name and date of birth verified.. Electronic consents reviewed and signatures obtained.   Patient given supplies needed to obtain urine specimen.    Instructions reviewed with the patient on the way the specimen should be obtained.   Patient stated that  they understood the information provided for the collection and  placement of the specimen. Specimen collected in clinic.  Patient given supplies and printed instructions for the collection of the 24 hour urine specimen.  Patient reports understanding the instructions provided to obtain the specimen and the storage of the specimen while at home.  Patient given instructed to bring the container to 2nd floor lab when the collection is completed. Patient appointments reviewed along with location and special instructions.  Patient contact information verified.  Patient questions answered and concerns addressed.

## 2022-07-21 NOTE — PROGRESS NOTES
Transplant Recipient Adult Psychosocial Assessment    Both patients parents Sharon and Irvin Sr, who are the patients primary and secondary caregivers were present during evaluation.     Irvin Diaz  [address] [address]  Telephone Information:   Mobile [phone]   Mobile [phone]   Home  [phone] (home)  Work  [phone] (work)  E-mail  [email or web address]    Sex: male  YOB: 1975  Age: 46 y.o.    Encounter Date: 7/21/2022  [place] Citizen: yes  Primary Language: English   Needed: no    Emergency Contact:  Name: Sharon Diaz   Relationship: mother  Address: same as patient   Phone Numbers:  [phone] (mobile)    Family/Social Support:   Number of dependents/: Patient denies any dependents in his care.   Marital history: Patient reports 2 past marriages and is currently  since 2005 to Florentino (44).  Other family dynamics: Patient has both parents in evaluation Sharon (72) and Irvin Sr. (73). Pt reports 6 adult child who all live local except Reema (28) who lives in [place]. Patients adult children who live local include: Meagan Diaz (24), Marielena Diaz (24), Stan Diaz (20), Don Joe (19) and Irvin III (17) and one grand child. Patient has 2 sisters Laz José (50) and Janeth Piña (43) and 1 brother Telly Diaz (41). Pt reports being close with all family members.    Household Composition:  Name: rIvin Diaz .   Age: 46  Relationship: patient  Does person drive? yes    Name: Sharon Diaz   Age: 71  Relationship: mother  Does person drive? no     Name: Irvin Murilloder Sr.   Age: 73  Relationship: father  Does person drive? yes     Name: Janeth Ayana  Age: 43  Relationship: sister   Does person drive? yes    Name: Tony Piña III  Age: 20  Relationship: nephew   Does person drive? yes    Name: Myranda Piña   Age: 8  Relationship: niece   Does person drive? no    Do you and your  caregivers have access to reliable transportation? yes- Patients mother and father bring patient everywhere together and patients father will drive patients mother and patient.   PRIMARY CAREGIVER: Sharon Diaz, patients mother will be primary caregiver, phone number .      provided in-depth information to patient and caregiver regarding pre- and post-transplant caregiver role.   strongly encourages patient and caregiver to have concrete plan regarding post-transplant care giving, including back-up caregiver(s) to ensure care giving needs are met as needed.    Patient and Caregiver states understanding all aspects of caregiver role/commitment and is able/willing/committed to being caregiver to the fullest extent necessary.    Patient and Caregiver verbalizes understanding of the education provided today and caregiver responsibilities.         remains available. Patient and Caregiver agree to contact  in a timely manner if concerns arise.      Able to take time off work without financial concerns: yes. Patient reports both of patients parents are retired.     Additional Significant Others who will Assist with Transplant:  Name: Irvin Sr (present in evaluation)  Age: 72  City: [place]: [place]  Relationship: father  Does person drive? yes  [phone]     Name: Sourav Diaz   Age: 44  City: [place]  Relationship: wife  Does person drive? yes   [phone]    Living Will: yes  Healthcare Power of : no  Advance Directives on file: <<no information> per medical record.  Verbally reviewed LW/HCPA information.   provided patient with copy of LW/HCPA documents and provided education on completion of forms.    Living Donors: N/A    Highest Education Level: Attended College/Technical School  Reading Ability: 12th grade  Reports difficulty with: seeing- patient reports wearing reading glasses.   Learns Best By:  Patient reports learning best by combination of hands on learning, written information, and reading information.      Status: no  VA Benefits: no     Working for Income: No  If no, reason not working: Disability Patient reports that he has been receiving disability since 2019 due to a hip injury and is now receiving it due to his liver diagnosis.   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      Spouse/Significant Other Employment: Patient reports patients wife is a real state agent for 3 yrs now.    Disabled: yes, since .    Monthly Income: $ 1400   Able to afford all costs now and if transplanted, including medications: yes  Patient and Caregiver verbalizes understanding of personal responsibilities related to transplant costs and the importance of having a financial plan to ensure that patients transplant costs are fully covered.      provided fundraising information/education. Patient and Caregiver verbalizes understanding.   remains available.    Insurance:   Payer/Plan Subscr  Sex Relation Sub. Ins. ID Effective Group Num   1. [place] - [place]* MAR[place] IS[place]EL LUX* 1975 Male Self [id number] 21                                    [address]   2. [place] L[place]* 1975 Male Self [id number]* 20                                    [address] [address]     Primary Insurance (for UNOS reporting): Public Insurance - Medicare & Choice  Secondary Insurance (for UNOS reporting): Public Insurance - Medicaid  Patient and Caregiver verbalizes clear understanding that patient may experience difficulty obtaining and/or be denied insurance coverage post-surgery. This includes and is not limited to disability insurance, life insurance, health insurance, burial insurance, long term care insurance, and other insurances.    Patient and Caregiver also reports understanding that future health concerns related to or unrelated to  "transplantation may not be covered by patient's insurance. Resources and information provided and reviewed.      Patient and Caregiver provides verbal permission to release any necessary information to outside resources for patient care and discharge planning. Resources and information provided are reviewed.      Dialysis Adherence: patient denies     Infusion Service: patient utilizing? no  Home Health: patient utilizing? yes [facility] in the past and now using [facility] currently.   DME: yes cane, walker, bp cuff   Pulmonary/Cardiac Rehab: pt denies   ADLS: Patient reports being independent with all his day to day tasks "for the most part".    Adherence: Patient reports adhering to his health care regiemen. Adherence education and counseling provided.     Per History Section:  Past Medical History:   Diagnosis Date    Alcoholic cirrhosis of liver     Arthritis     ATN (acute tubular necrosis)     CHF (congestive heart failure)     Diverticulitis 2020    Esophageal varices     GERD (gastroesophageal reflux disease)     GI bleed     Hip arthritis     Left    Hypertension     Liver cirrhosis     Macrocytic anemia     Pulmonary embolism 2018    Unprovoked DVT.  Stop Coumadin due to GIB    Thrombocytopenia      Social History     Tobacco Use    Smoking status: Former Smoker     Types: Cigarettes     Quit date: 2000     Years since quittin.5    Smokeless tobacco: Never Used    Tobacco comment: quit 20 years ago   Substance Use Topics    Alcohol use: Not Currently     Comment: freq     Social History     Substance and Sexual Activity   Drug Use Yes    Types: Marijuana    Comment: occasionally     Social History     Substance and Sexual Activity   Sexual Activity Yes    Comment: occ       Per Today's Psychosocial:  Tobacco: pt denies current use. Patient reports last use of smoking cigarettes being over 25 yrs ago.   Alcohol: Patient reports last use being 1-2 weeks ago. Patient reports when " he drinks he drinks Gin and drinking about a pint a day. Patient reports relapsing the last 2-3 months and attending an IOP before that. Patient reports drinking a pint of Gin a day for a couple of years. Patient reports signing up for [facility] and is unsure of his start date. Patient reports being committed to sobriety.   Per patients chart: Patient was drinking up until April 2022 and was sober for 5 months until he relapsed a month ago and had a positive PETH.   Illicit Drugs/Non-prescribed Medications: Patient denies current use. Patient reports last smoking marijuana 3 weeks to a month ago. Patient reports smoking marijuana on and off for 15 yrs. Patient reports planning on quitting smoking marijuana. Patient reports using Cocaine in the past in 2002 for about a month before getting arrested for possession.     Patient and Caregiver states clear understanding of the potential impact of substance use as it relates to transplant candidacy and is aware of possible random substance screening. Substance abstinence/cessation counseling, education and resources provided and reviewed.     Arrests/DWI/Treatment/Rehab: Patient reports being currently signed up for [facility] yesterday and is unsure of when his first day will be. Patient reports attending an IOP in the past about 1 yr ago. Patient reports not being able to remember the name of the IOP he has attended in the past. Patient also reports attending AA online in the past for 3 months during COVID. Patient reports being arrested in the past for possession of marijuana and cocaine in 2002 and completed a drug tiffany program.     Psychiatric History:    Mental Health: patient denies.  Psychiatrist/Counselor: Patient reports seeing a psychiatrist through his IOP in the past.   Medications: Patient reports currently taking Gavapenton for anxiety. Patient reports being prescribed this medication through his last IOP. Patient reports currently taking Ativan for 5  days after getting out of his recently hospital admission.   Suicide/Homicide Issues: pt denies.  Safety at home: Patient reports feeling safe in his home environment.    Knowledge: Patient and Caregiver states having clear understanding and realistic expectations regarding the potential risks and potential benefits of organ transplantation and organ donation, agrees to discuss with health care team members and support system members and to utilize available resources and express questions and/or concerns in order to further facilitate the pt informed decision-making. Resources and information provided and reviewed.     Patient and Caregiver is aware of [facility]'s affiliation and/or partnership with agencies in home health care, LTAC, SNF, [facility], and other hospitals and clinics.    Understanding: Patient and Caregiver reports having a clear understanding of the many lifetime commitments involved with being a transplant recipient, including costs, compliance, medications, lab work, procedures, appointments, concrete and financial planning, preparedness, timely and appropriate communication of concerns, abstinence (ETOH, tobacco, illicit non-prescribed drugs), adherence to all health care team recommendations, support system and caregiver involvement, appropriate and timely resource utilization and follow-through, mental health counseling as needed/recommended, and patient and caregiver responsibilities.  Social Service Handbook, resources and detailed educational information provided and reviewed. Educational information provided.    Patient and Caregiver also reports current and expected compliance with health care regime and states having a clear understanding of the importance of compliance.      Patient and Caregiver reports a clear understanding that risks and benefits may be involved with organ transplantation and with organ donation.      Patient and Caregiver also reports clear understanding that psychosocial  risk factors may affect patient, and include but are not limited to feelings of depression, generalized anxiety, anxiety regarding dependence on others, post traumatic stress disorder, feelings of guilt and other emotional and/or mental concerns, and/or exacerbation of existing mental health concerns.  Detailed resources provided and discussed.     Patient and Caregiver agrees to access appropriate resources in a timely manner as needed and/or as recommended, and to communicate concerns appropriately. Patient and Caregiver also reports a clear understanding of treatment options available.      reviewed education, provided additional information, and answered questions.    Feelings or Concerns: Patient reports feeling fine about transplant and overall wants to stop drinking.     Coping: Identify Patient & Caregiver Strategies to Vickery:   1. Currently & Pre-transplant - watching TV and spending time with his kids and grandson   2. At the time of surgery - family support   3. During post-Transplant & Recovery Period - family support    Goals: Patient reports hopefully getting back to the point where he can get back to work. Patient referred to Vocational Rehabilitation.    Interview Behavior: Patient and Caregiver presents as alert and oriented x 4, calm, communicative, cooperative and asking and answering questions appropriately.      Transplant Social Work - Candidacy  Assessment/Plan:     Psychosocial Suitability: Patient presents as a high risk candidate for liver transplant at this time. Based on psychosocial risk factors, patient presents as high risk due to less than a year of sobriety and recent alcohol and marijuana use. Patient reports last ETOH use being 1-2 weeks ago. Per patients chart: patient relapsed a month ago and was sober for 5 months prior. Patient had a positive PETH on 6/30/22 of 663. Patient reports drinking Gin and drinking about a pint a day. Patient reports drinking at this  capacity for a couple of years. Patient reports relapsing the last 2-3 months and attending an IOP before that.  Patient reports signing up for [facility] and is unsure of his start date. Patient does report being committed to sobriety wanting to stop drinking. Patient reports last use of marijuana being 3 weeks ago. Patient reports smoking marijuana on and off for 15 yrs. Patient reports planning on quitting smoking marijuana. Patient reports using Cocaine in the past in 2002 for about a month before getting arrested for possession and completing a drug tiffany program afterwards.     Protective factors: Patient with confirmed caregiver plan and family support. Patient with lack of tobacco use and adequate finances and insurance. Patient denies any mental health difficulties and is currently taking Gavapenton.     Recommendations/Additional Comments:   - maintain sobriety from ETOH use  - Attend [facility] IOP   - abstain from marijuana use    Yuridia Cunha LMSW

## 2022-07-21 NOTE — TELEPHONE ENCOUNTER
----- Message from Edouard Gibson MD sent at 7/20/2022  5:10 PM CDT -----  Please schedule for recent hospital visit follow-up

## 2022-07-21 NOTE — PROGRESS NOTES
TRANSPLANT NUTRITIONAL ASSESSMENT    Referring Provider: Jacek Arreaga MD    Reason for Visit: Pre-liver transplant work-up    Age: 46 y.o.  Sex: male    Patient Active Problem List   Diagnosis    Acute renal failure superimposed on stage 3 chronic kidney disease    Coagulopathy    Thrombocytopenia    History of pulmonary embolus (PE)    AVN (avascular necrosis of bone)    Hydronephrosis of right kidney    Transaminitis    Elevated lipase    Essential hypertension    Alcoholic cirrhosis of liver    CKD (chronic kidney disease) stage 3, GFR 30-59 ml/min    Hip arthritis    Arm pain    Shortness of breath    History of GI bleed    Colitis    Alcohol abuse    Refeeding syndrome    Hematemesis    Alcoholic ketoacidosis    Cirrhosis of liver    Ascites due to alcoholic cirrhosis    Chronic diastolic CHF (congestive heart failure)    Jaundice    Severe sepsis without septic shock    Hyperbilirubinemia    Dilation of common bile duct    Chronic left hip pain    Alcohol withdrawal    Malnutrition    Alcohol use disorder, severe, dependence    Encounter for pre-transplant evaluation for liver transplant    Hepatic encephalopathy     Past Medical History:   Diagnosis Date    Alcoholic cirrhosis of liver     Arthritis     ATN (acute tubular necrosis)     CHF (congestive heart failure)     Diverticulitis 01/2020    Esophageal varices     GERD (gastroesophageal reflux disease)     GI bleed     Hip arthritis     Left    Hypertension     Liver cirrhosis     Macrocytic anemia     Pulmonary embolism 08/2018    Unprovoked DVT.  Stop Coumadin due to GIB    Thrombocytopenia      Lab Results   Component Value Date     07/21/2022    GLU 99 09/02/2018    K 3.8 07/21/2022    PHOS 2.7 03/11/2022    MG 2.0 06/08/2021    CHOL 251 (H) 10/27/2020    HDL 62 10/27/2020    TRIG 52 10/27/2020    ALBUMIN 3.7 07/21/2022    AMMONIA 67 (H) 07/18/2022    HGBA1C 5.7 11/12/2020    CALCIUM 9.4  "07/21/2022     Other Pertinent Labs: none    Current Outpatient Medications   Medication Sig    carvediloL (COREG) 6.25 MG tablet Take 1 tablet (6.25 mg total) by mouth Daily.    folic acid (FOLVITE) 1 MG tablet Take 1 tablet (1 mg total) by mouth once daily.    gabapentin (NEURONTIN) 300 MG capsule Take 1 capsule (300 mg total) by mouth every evening for 3 days, THEN 1 capsule (300 mg total) 2 (two) times daily for 3 days, THEN 1 capsule (300 mg total) 3 (three) times daily for 24 days. (Patient taking differently: Take 1 capsule by mouth three times daily)    lactulose (CHRONULAC) 10 gram/15 mL solution Take 45 mL (30 g total) by mouth 3 (three) times daily.    LORazepam (ATIVAN) 0.5 MG tablet Take 1 tablet (0.5 mg total) by mouth every 6 (six) hours as needed for Anxiety.    oxyCODONE (ROXICODONE) 5 MG immediate release tablet Take 5 mg by mouth every 4 (four) hours as needed for Pain.    pantoprazole (PROTONIX) 40 MG tablet Take 1 tablet (40 mg total) by mouth once daily. (Patient taking differently: Take 40 mg by mouth 2 (two) times daily.)    rifAXIMin (XIFAXAN) 550 mg Tab Take 1 tablet (550 mg total) by mouth 2 (two) times daily.    sucralfate (CARAFATE) 1 gram tablet Take 1 g by mouth 4 (four) times daily.    thiamine 100 MG tablet Take 1 tablet (100 mg total) by mouth once daily.    triamcinolone acetonide 0.1% (KENALOG) 0.1 % ointment Apply topically 2 (two) times daily. So not apply to face or anogenital region (Patient taking differently: Apply topically 2 (two) times daily as needed (itching). do not apply to face or anogenital region)     No current facility-administered medications for this visit.     Allergies: Ciprofloxacin hcl, Meperidine, Morphine, Nsaids (non-steroidal anti-inflammatory drug), Oxycodone, and Tylenol [acetaminophen]    Ht Readings from Last 1 Encounters:   07/21/22 5' 10.08" (1.78 m)     Wt Readings from Last 1 Encounters:   07/21/22 84.1 kg (185 lb 6.5 oz)      BMI: " "Body mass index is 26.54 kg/m².    Usual Weight: 187-191 lb  Weight Change/Time: reports over the 5 yrs he has lost 40-60 lb, unintentional  Current Diet: regular  Appetite/Current Intake: fair   Exercise/Physical Activity: functional in ADL"s, using cane for balance/stability  Frailty Score: 4.35    Nutritional/Herbal Supplements: Vit D, thiamine, folic acid, occasional Vanilla muscle milk shake  Potential Food/Medication Interactions: reviewed  Chewing/Swallowing Problems: none  Symptoms: none  Assessment of Lab Values: reviewed  Support System: pt's mother present, pt states he lives with parents, his father likes to cook    Estimated Kcal Need: 3748-1299 (25-30 kcal/kg)  Estimated Protein Need:  gm (1-1.5 gm/kg)    Nutritional History:   Pt reports to have a fair appetite , no n/v/d/adb pain. Pt has take out/ out to eat 2-3 x/ week. Pt may have Canes, [employer] or [employer].   He provided the following diet recall:  B: eggs, kelley or sausage, toast  L: left overs / sandwich (tuna/ham/liver cheese)  Beverages: water, juice  Snack: chips, peaches, fruit cocktail  D: red beans & rice / chicken/beef/pork/fried shrimp, corn/green beans/peas, rice/potatoes/pasta - meat are usually grilled or baked at home  Snack: can of soup    Nutritional Diagnoses  Problem: food- and nutrition-related knowledge deficit  Etiology: r/t no prior edu on pre liver transplant nutrition recommendations  Symptoms: aeb diet recall    Educational Need? yes  Barriers: none identified  Discussed with: patient and caregiver  Interventions: Patient taught nutrition information regarding Pre-liver transplant work-up.    Pre liver transplant nutrition packet provided and discussed. Pt advised on the recommendations to follow a low sodium diet while taking in adequate protein.  This packet includes label low sodium reading tips, seasoning suggestions, protein intake goal amount (gm) for the individual patient, as well as oral supplement " suggestions.   Exercise and physical activity are encouraged.    Goals/Recommendations: diet adherence  Actions Taken: instruct/provide written information  Strategies Used: problem solving, goal setting, motivational interviewing  Patient and/or family comprehend instructions: yes , adherence expected  Outcome: Verbalizes understanding  Monitoring: Contact information provided, will f/u in clinic and communicate with the care team as needed.     Counseling Time: 15 minutes

## 2022-07-21 NOTE — PROGRESS NOTES
Irvin was  seen in clinic for [facility] evaluation.  Handbook on pre-liver transplant information (see outline below) was given to the patient.  Patient's parents , accompanied him to scheduled appointments.  Patient viewed pre-liver transplant education slides via desktop in transplant clinic.  Informed consent signed and written information given on selection criteria.    LIVER TRANSPLANT WORK-UP EDUCATION   I. UNDERSTANDING THE TRANSPLANT PROCESS     A. Transplant team      B. MELD score      C. Balancing urgency and outcome     D. Liver Transplant Options         1.  Donor         2. Living Donor--rationale, benefits     E. Transplant Work-up         1. Medical         2. Psychological and Social--lifetime commitment, life changes, personal plan/ goal         3. Financial--fundraising     F.  Completed work-up and Next Steps    G. Wait Time         1.  Can be listed at more than one center         2.  Can transfer wait time     H. The Call       I. Possible donor options         1. DCD         2. Hep B Core and Hep C Positive         3. Increased Risk     J.  Liver Transplant Surgery         1. Length         2. Transfusions, cell saver         3. Surgical risks         4. What to expect after sugery--Central lines, drains, Saravia catheter, incision, endotracheal              tube, NG tube, length of stay in ICU/ TSU  II.  HOW TO BEST CARE FOR YOURSELF (Take Five To Thrive)  III. UNDERSTANDING LIFE AFTER TRANSPLANT  A. Medicines after transplant      1. Immunosuppression--infection and rejection  B. Labs   IV. ADULT LIVER SURVIVAL RATES

## 2022-07-21 NOTE — PROGRESS NOTES
PHARM.D. PRE-TRANSPLANT NOTE:    This patient's medication therapy was evaluated as part of his pre-transplant evaluation.      The following general pharmacologic concerns were noted: hasn't started rifaximin yet; will need to separate sucralfate from meds (espeically anti-rejection meds) if continued post-op      The following concerns for post-operative pain management were noted:  with oxycodone and tramadol picked up monthly, pt did not report taking tramadol though.  Also lorazepam on  for short supplies.  May need increased pain regimen post-op.    The following pharmacologic concerns related to HCV therapy were noted: none      This patient's medication profile was reviewed for considerations for DAA Hepatitis C therapy:    [x]  No current inducers of CYP 3A4 or PGP  [x]  No amiodarone on this patient's EMR profile in the last 24 months  [x]  No past or current atrial fibrillation on this patient's EMR profile       Current Outpatient Medications   Medication Sig Dispense Refill    carvediloL (COREG) 6.25 MG tablet Take 1 tablet (6.25 mg total) by mouth Daily. 30 tablet 5    folic acid (FOLVITE) 1 MG tablet Take 1 tablet (1 mg total) by mouth once daily. 30 tablet 5    gabapentin (NEURONTIN) 300 MG capsule Take 1 capsule (300 mg total) by mouth every evening for 3 days, THEN 1 capsule (300 mg total) 2 (two) times daily for 3 days, THEN 1 capsule (300 mg total) 3 (three) times daily for 24 days. (Patient taking differently: Take 1 capsule by mouth three times daily) 81 capsule 0    lactulose (CHRONULAC) 10 gram/15 mL solution Take 45 mL (30 g total) by mouth 3 (three) times daily. 4050 mL 0    LORazepam (ATIVAN) 0.5 MG tablet Take 1 tablet (0.5 mg total) by mouth every 6 (six) hours as needed for Anxiety. 5 tablet 0    oxyCODONE (ROXICODONE) 5 MG immediate release tablet Take 5 mg by mouth every 4 (four) hours as needed for Pain.      pantoprazole (PROTONIX) 40 MG tablet Take 1 tablet (40 mg  total) by mouth once daily. (Patient taking differently: Take 40 mg by mouth 2 (two) times daily.) 30 tablet 11    rifAXIMin (XIFAXAN) 550 mg Tab Take 1 tablet (550 mg total) by mouth 2 (two) times daily. 60 tablet 6    sucralfate (CARAFATE) 1 gram tablet Take 1 g by mouth 4 (four) times daily.      thiamine 100 MG tablet Take 1 tablet (100 mg total) by mouth once daily. 30 tablet 5    triamcinolone acetonide 0.1% (KENALOG) 0.1 % ointment Apply topically 2 (two) times daily. So not apply to face or anogenital region (Patient taking differently: Apply topically 2 (two) times daily as needed (itching). do not apply to face or anogenital region) 30 g 1     No current facility-administered medications for this visit.           I am available for consultation and can be contacted, as needed by the other members of the Transplant team.

## 2022-07-22 ENCOUNTER — HOSPITAL ENCOUNTER (OUTPATIENT)
Dept: CARDIOLOGY | Facility: HOSPITAL | Age: 47
Discharge: HOME | End: 2022-07-22
Attending: INTERNAL MEDICINE
Payer: MEDICARE

## 2022-07-22 VITALS
WEIGHT: 191 LBS | DIASTOLIC BLOOD PRESSURE: 69 MMHG | HEART RATE: 76 BPM | HEIGHT: 72 IN | RESPIRATION RATE: 18 BRPM | SYSTOLIC BLOOD PRESSURE: 140 MMHG | BODY MASS INDEX: 25.87 KG/M2

## 2022-07-22 DIAGNOSIS — Z76.82 ORGAN TRANSPLANT CANDIDATE: ICD-10-CM

## 2022-07-22 LAB
CV STRESS BASE HR: 65 BPM
DIASTOLIC BLOOD PRESSURE: 80 MMHG
EJECTION FRACTION- HIGH: 65 %
END DIASTOLIC INDEX-HIGH: 153 ML/M2
END DIASTOLIC INDEX-LOW: 93 ML/M2
END SYSTOLIC INDEX-HIGH: 71 ML/M2
END SYSTOLIC INDEX-LOW: 31 ML/M2
NUC REST DIASTOLIC VOLUME INDEX: 159
NUC REST EJECTION FRACTION: 58
NUC REST SYSTOLIC VOLUME INDEX: 66
NUC STRESS DIASTOLIC VOLUME INDEX: 173
NUC STRESS EJECTION FRACTION: 58 %
NUC STRESS SYSTOLIC VOLUME INDEX: 73
OHS CV CPX 1 MINUTE RECOVERY HEART RATE: 87 BPM
OHS CV CPX 85 PERCENT MAX PREDICTED HEART RATE MALE: 147
OHS CV CPX MAX PREDICTED HEART RATE: 173
OHS CV CPX PATIENT IS FEMALE: 0
OHS CV CPX PATIENT IS MALE: 1
OHS CV CPX PEAK DIASTOLIC BLOOD PRESSURE: 70 MMHG
OHS CV CPX PEAK HEAR RATE: 68 BPM
OHS CV CPX PEAK RATE PRESSURE PRODUCT: NORMAL
OHS CV CPX PEAK SYSTOLIC BLOOD PRESSURE: 150 MMHG
OHS CV CPX PERCENT MAX PREDICTED HEART RATE ACHIEVED: 39
OHS CV CPX RATE PRESSURE PRODUCT PRESENTING: 9750
RETIRED EF AND QEF - SEE NOTES: 53 %
SYSTOLIC BLOOD PRESSURE: 150 MMHG

## 2022-07-22 PROCEDURE — 93018 CV STRESS TEST I&R ONLY: CPT | Mod: TXP,,, | Performed by: INTERNAL MEDICINE

## 2022-07-22 PROCEDURE — 78452 HT MUSCLE IMAGE SPECT MULT: CPT | Mod: 26,TXP,, | Performed by: INTERNAL MEDICINE

## 2022-07-22 PROCEDURE — 63600175 PHARM REV CODE 636 W HCPCS: Mod: TXP | Performed by: INTERNAL MEDICINE

## 2022-07-22 PROCEDURE — 93018 STRESS TEST WITH MYOCARDIAL PERFUSION (CUPID ONLY): ICD-10-PCS | Mod: TXP,,, | Performed by: INTERNAL MEDICINE

## 2022-07-22 PROCEDURE — 93016 CV STRESS TEST SUPVJ ONLY: CPT | Mod: TXP,,, | Performed by: INTERNAL MEDICINE

## 2022-07-22 PROCEDURE — 78452 STRESS TEST WITH MYOCARDIAL PERFUSION (CUPID ONLY): ICD-10-PCS | Mod: 26,TXP,, | Performed by: INTERNAL MEDICINE

## 2022-07-22 PROCEDURE — A9502 TC99M TETROFOSMIN: HCPCS | Mod: TXP

## 2022-07-22 PROCEDURE — 93016 STRESS TEST WITH MYOCARDIAL PERFUSION (CUPID ONLY): ICD-10-PCS | Mod: TXP,,, | Performed by: INTERNAL MEDICINE

## 2022-07-22 RX ORDER — AMINOPHYLLINE 25 MG/ML
75 INJECTION, SOLUTION INTRAVENOUS ONCE
Status: COMPLETED | OUTPATIENT
Start: 2022-07-22 | End: 2022-07-22

## 2022-07-22 RX ORDER — REGADENOSON 0.08 MG/ML
0.4 INJECTION, SOLUTION INTRAVENOUS ONCE
Status: COMPLETED | OUTPATIENT
Start: 2022-07-22 | End: 2022-07-22

## 2022-07-22 RX ADMIN — AMINOPHYLLINE 75 MG: 25 INJECTION, SOLUTION INTRAVENOUS at 01:07

## 2022-07-22 RX ADMIN — REGADENOSON 0.4 MG: 0.08 INJECTION, SOLUTION INTRAVENOUS at 01:07

## 2022-07-22 NOTE — PHYSICIAN QUERY
PT Name: Irvin Diaz Jr.  MR #: [id number]     DOCUMENTATION CLARIFICATION      CDS: Irma NUNO,RN        Contact information:[email or web address]   This form is a permanent document in the medical record.     Query Date: July 22, 2022    By submitting this query, we are merely seeking further clarification of documentation to reflect the severity of illness of your patient. Please utilize your independent clinical judgment when addressing the question(s) below.    The Medical Record contains the following:     Indicators   Supporting Clinical Findings Location in Medical Record   x Documentation/History of Hepatic Encephalopathy  Patient has been admitted to Hospital Medicine for hepatic encephalopathy.   Hepatic encephalopathy    7/18 ED Provider Notes   x Neurological Symptoms 46-year-old male  who presents with confusion.  Per patient's wife, the confusion is new the past day, insidious onset, constant, progressive.  Altered mental status, unspecified altered mental status type    46 y.o. male who presents for AMS   7/18 ED Provider Notes             7/19 Hepatology Consults      x Lab Value(s) Alk Phosphate: 77---->69--->67----->84  T bilirubin:         6.8-->6.2-->6.2---->8.4  AST:                 47--->47--->47----->53  ALT:                  19-->18--->20------>23    GGT: 81   Ammonia: 67     7/18---->7/21 Labs         7/21 Lab   7/18 Lab   x Treatment/Medication  Lactulose 20 gram/30 ml solution Oral  3 times daily   Rifaximin  Tablet 550 mg  Oral 2 times daily       7/18---->7/20 MAR   7/18---->7/20 MAR   x Other  45 yo M with suspected decompensated liver failure (2/2 alcohol use), hemodynamically stable but worsening jaundice and confusion    No asterixis  PMH of alcoholic cirrhosis of the liver, CHF, PE, and esophageal varices   MELD-Na score: 25 at 7/18/2022    Past Medical History:  Alcoholic cirrhosis of liver  Liver cirrhosis    Home Medications:  Rifaximin  550 mg Tab 2 by mouth 2  times daily                7/18 ED Provider Notes                     7/19 Psychiatry Consults      Provider, please further specify the diagnosis of Hepatic Encephalopathy.  [   ] Acute   [   ] Chronic   [   ] Subacute   [  X ] Acute on chronic   [   ] Other clarification (please specify): _____________   [   ] Clinically undetermined     Please document in your progress notes daily for the duration of treatment until resolved, and include in your discharge summary.    Form No. [id number]

## 2022-07-23 LAB
BACTERIA BLD CULT: NORMAL
BACTERIA BLD CULT: NORMAL

## 2022-07-26 ENCOUNTER — OFFICE VISIT (OUTPATIENT)
Dept: PRIMARY CARE CLINIC | Facility: CLINIC | Age: 47
End: 2022-07-26
Payer: MEDICARE

## 2022-07-26 VITALS
HEART RATE: 81 BPM | WEIGHT: 194.75 LBS | HEIGHT: 72 IN | SYSTOLIC BLOOD PRESSURE: 118 MMHG | OXYGEN SATURATION: 99 % | RESPIRATION RATE: 18 BRPM | DIASTOLIC BLOOD PRESSURE: 70 MMHG | BODY MASS INDEX: 26.38 KG/M2

## 2022-07-26 DIAGNOSIS — D68.9 COAGULOPATHY: ICD-10-CM

## 2022-07-26 DIAGNOSIS — D62 ACUTE BLOOD LOSS ANEMIA: ICD-10-CM

## 2022-07-26 DIAGNOSIS — K76.82 HEPATIC ENCEPHALOPATHY: Primary | ICD-10-CM

## 2022-07-26 DIAGNOSIS — Z86.711 HISTORY OF PULMONARY EMBOLUS (PE): Chronic | ICD-10-CM

## 2022-07-26 DIAGNOSIS — K70.31 ASCITES DUE TO ALCOHOLIC CIRRHOSIS: ICD-10-CM

## 2022-07-26 DIAGNOSIS — Z87.19 HISTORY OF GI BLEED: Chronic | ICD-10-CM

## 2022-07-26 DIAGNOSIS — I10 ESSENTIAL HYPERTENSION: Chronic | ICD-10-CM

## 2022-07-26 DIAGNOSIS — R17 JAUNDICE: ICD-10-CM

## 2022-07-26 DIAGNOSIS — N13.30 HYDRONEPHROSIS OF RIGHT KIDNEY: ICD-10-CM

## 2022-07-26 DIAGNOSIS — K74.69 OTHER CIRRHOSIS OF LIVER: ICD-10-CM

## 2022-07-26 DIAGNOSIS — E80.6 HYPERBILIRUBINEMIA: ICD-10-CM

## 2022-07-26 DIAGNOSIS — I50.32 CHRONIC DIASTOLIC CHF (CONGESTIVE HEART FAILURE): ICD-10-CM

## 2022-07-26 PROCEDURE — 99214 OFFICE O/P EST MOD 30 MIN: CPT | Mod: PBBFAC,PN,TXP | Performed by: FAMILY MEDICINE

## 2022-07-26 PROCEDURE — 99214 OFFICE O/P EST MOD 30 MIN: CPT | Mod: S$PBB,NTX,, | Performed by: FAMILY MEDICINE

## 2022-07-26 PROCEDURE — 99999 PR PBB SHADOW E&M-EST. PATIENT-LVL IV: CPT | Mod: PBBFAC,TXP,, | Performed by: FAMILY MEDICINE

## 2022-07-26 PROCEDURE — 99999 PR PBB SHADOW E&M-EST. PATIENT-LVL IV: ICD-10-PCS | Mod: PBBFAC,TXP,, | Performed by: FAMILY MEDICINE

## 2022-07-26 PROCEDURE — 99214 PR OFFICE/OUTPT VISIT, EST, LEVL IV, 30-39 MIN: ICD-10-PCS | Mod: S$PBB,NTX,, | Performed by: FAMILY MEDICINE

## 2022-07-26 RX ORDER — SILDENAFIL 100 MG/1
100 TABLET, FILM COATED ORAL DAILY PRN
Qty: 30 TABLET | Refills: 5 | Status: SHIPPED | OUTPATIENT
Start: 2022-07-26 | End: 2022-10-28 | Stop reason: SDUPTHER

## 2022-07-26 NOTE — PROGRESS NOTES
Subjective:       Patient ID: Irvin Diaz Jr. is a 46 y.o. male.    Chief Complaint: Follow-up    HPI: 47 yo BM --follow up form hospital -seen emergency room [id number] left hip pain/alcohol withdrawal/altered mental status/CHF/possible liver transplant  2-3 days before went into the hospital at stop drinking--started seeing things--writing on the walls--bugs--felt was from medication he was taking for left hip so stopped it. In [facility] First time had procedure on him --hurt alot . Got blood . Went home --took pain meds then seeing things .         Patient thought that he was going to get a bone scan but parents checked him into the emergency room since patient was hallucinating see above lab        Eating well  + BM --Ambulating OK --bowels OKwithout lactulose .       Patient with osteoporosis at some point needs left hip replacement--patient trying to work on a liver replacement 1st.Pt with transplant teAM HAILE       ROS:  Skin: no psoriasis, +eczema, skin cancer  HEENT: No headache, ocular pain, blurred vision, diplopia, epistaxis, hoarseness change in voice, thyroid trouble  Lung: No pneumonia, asthma, Tb, wheezing, SOB, NO SMOKING  Heart: No chest pain, ankle edema, palpitations, MI, yo murmur, +hypertension, nO hyperlipidemia --NO STENT BYPASS ARRHYTHMIA  Abdomen: No nausea, vomiting, diarrhea, constipation, ulcers, hepatitis, gallbladder disease, melena, hematochezia, hematemesis--had upper GI bleed in the past had lower GI bleed in the past has history of cirrhosis of the liver ascites due to alcoholic cirrhosis  : no UTI, renal disease, stones  MS: no fractures, O/A, lupus, rheumatoid, gout history avascular necrosis of the femoral head why patient needs left hip replaced  Neuro: No dizziness, LOC, seizures   No diabetes, no anemia, no anxiety, no depression    5 children work disbaled due left hip lives with parents     Objective:   Physical Exam:  General: Well nourished, well developed, no  acute distress Overweight   Skin: No lesions  HEENT: Eyes PERRLA, EOM intact, nose patent, throat non-erythematous ears TM cler   NECK: Supple, no bruits, No JVD, no nodes  Lungs: Clear, no rales, rhonchi, wheezing  Heart: Regular rate and rhythm, no murmurs, gallops, or rubs  Abdomen: flat, bowel sounds positive, no tenderness, or organomegaly  MS: left hip pain Hx AVN fem head needs hip replacement   Neuro: Alert, CN intact, oriented X 3  Extremities: No cyanosis, clubbing, or edema         Assessment:       1. Hepatic encephalopathy    2. Hyperbilirubinemia    3. Jaundice    4. Ascites due to alcoholic cirrhosis    5. Other cirrhosis of liver    6. History of GI bleed    7. Acute blood loss anemia    8. Coagulopathy    9. History of pulmonary embolus (PE)    10. Hydronephrosis of right kidney    11. Essential hypertension    12. Chronic diastolic CHF (congestive heart failure)        Plan:       Hepatic encephalopathy    Hyperbilirubinemia    Jaundice    Ascites due to alcoholic cirrhosis    Other cirrhosis of liver    History of GI bleed    Acute blood loss anemia    Coagulopathy    History of pulmonary embolus (PE)    Hydronephrosis of right kidney    Essential hypertension    Chronic diastolic CHF (congestive heart failure)        Hx alcohol abuse with recent alcohol withdrawal --recent admit hepatic encephalopathy/cirrhosis of the liver/esophageal varices with 8 banding/history GI bleed-transfuse/history jaundice  History avascular necrosis of the femoral head left hip needs left hip replacement  History pulmonary embolus number cytopenia  Stage III chronic renal insufficiency  Hypertension CHF  Patient to see liver transplant  Anemia hematocrit 26.6 patient--was transfused in the hospital platelets [id number] vitamin-D and vitamin-A deficiency  Lab CBC CMP lipid Thyroid T4 TSH ammonia level   Health maintenance COVID vaccine  History exam

## 2022-08-02 ENCOUNTER — LAB VISIT (OUTPATIENT)
Dept: LAB | Facility: HOSPITAL | Age: 47
End: 2022-08-02
Attending: STUDENT IN AN ORGANIZED HEALTH CARE EDUCATION/TRAINING PROGRAM
Payer: MEDICARE

## 2022-08-02 ENCOUNTER — OFFICE VISIT (OUTPATIENT)
Dept: TRANSPLANT | Facility: CLINIC | Age: 47
End: 2022-08-02
Payer: MEDICARE

## 2022-08-02 ENCOUNTER — OFFICE VISIT (OUTPATIENT)
Dept: HEPATOLOGY | Facility: CLINIC | Age: 47
End: 2022-08-02
Payer: MEDICARE

## 2022-08-02 ENCOUNTER — OFFICE VISIT (OUTPATIENT)
Dept: PAIN MEDICINE | Facility: CLINIC | Age: 47
End: 2022-08-02
Payer: MEDICARE

## 2022-08-02 VITALS
BODY MASS INDEX: 25.73 KG/M2 | RESPIRATION RATE: 18 BRPM | SYSTOLIC BLOOD PRESSURE: 156 MMHG | HEART RATE: 72 BPM | WEIGHT: 190 LBS | HEIGHT: 72 IN | DIASTOLIC BLOOD PRESSURE: 86 MMHG

## 2022-08-02 VITALS
BODY MASS INDEX: 26.25 KG/M2 | RESPIRATION RATE: 18 BRPM | SYSTOLIC BLOOD PRESSURE: 160 MMHG | DIASTOLIC BLOOD PRESSURE: 81 MMHG | HEART RATE: 70 BPM | TEMPERATURE: 98 F | WEIGHT: 193.81 LBS | HEIGHT: 72 IN | OXYGEN SATURATION: 100 %

## 2022-08-02 VITALS
WEIGHT: 190.69 LBS | BODY MASS INDEX: 25.83 KG/M2 | SYSTOLIC BLOOD PRESSURE: 148 MMHG | HEIGHT: 72 IN | DIASTOLIC BLOOD PRESSURE: 75 MMHG | HEART RATE: 75 BPM

## 2022-08-02 DIAGNOSIS — M87.052 AVASCULAR NECROSIS OF HIP, LEFT: ICD-10-CM

## 2022-08-02 DIAGNOSIS — G89.4 CHRONIC PAIN SYNDROME: Primary | ICD-10-CM

## 2022-08-02 DIAGNOSIS — G89.29 CHRONIC LEFT HIP PAIN: ICD-10-CM

## 2022-08-02 DIAGNOSIS — D69.6 THROMBOCYTOPENIA: ICD-10-CM

## 2022-08-02 DIAGNOSIS — F11.90 CHRONIC, CONTINUOUS USE OF OPIOIDS: ICD-10-CM

## 2022-08-02 DIAGNOSIS — E87.29 ALCOHOLIC KETOACIDOSIS: ICD-10-CM

## 2022-08-02 DIAGNOSIS — K70.31 ALCOHOLIC CIRRHOSIS OF LIVER WITH ASCITES: ICD-10-CM

## 2022-08-02 DIAGNOSIS — D50.0 IRON DEFICIENCY ANEMIA DUE TO CHRONIC BLOOD LOSS: Primary | ICD-10-CM

## 2022-08-02 DIAGNOSIS — K70.31 ASCITES DUE TO ALCOHOLIC CIRRHOSIS: ICD-10-CM

## 2022-08-02 DIAGNOSIS — I10 ESSENTIAL HYPERTENSION: Chronic | ICD-10-CM

## 2022-08-02 DIAGNOSIS — Z79.899 OTHER LONG TERM (CURRENT) DRUG THERAPY: ICD-10-CM

## 2022-08-02 DIAGNOSIS — Z76.82 ORGAN TRANSPLANT CANDIDATE: ICD-10-CM

## 2022-08-02 DIAGNOSIS — G89.4 CHRONIC PAIN SYNDROME: ICD-10-CM

## 2022-08-02 DIAGNOSIS — M25.552 CHRONIC LEFT HIP PAIN: ICD-10-CM

## 2022-08-02 DIAGNOSIS — N18.30 STAGE 3 CHRONIC KIDNEY DISEASE, UNSPECIFIED WHETHER STAGE 3A OR 3B CKD: ICD-10-CM

## 2022-08-02 DIAGNOSIS — Z01.818 ENCOUNTER FOR PRE-TRANSPLANT EVALUATION FOR LIVER TRANSPLANT: Primary | ICD-10-CM

## 2022-08-02 PROBLEM — I51.89 DIASTOLIC DYSFUNCTION: Status: ACTIVE | Noted: 2021-06-06

## 2022-08-02 PROCEDURE — 99214 OFFICE O/P EST MOD 30 MIN: CPT | Mod: PBBFAC,TXP | Performed by: INTERNAL MEDICINE

## 2022-08-02 PROCEDURE — 99215 OFFICE O/P EST HI 40 MIN: CPT | Mod: PBBFAC,27 | Performed by: STUDENT IN AN ORGANIZED HEALTH CARE EDUCATION/TRAINING PROGRAM

## 2022-08-02 PROCEDURE — 99215 OFFICE O/P EST HI 40 MIN: CPT | Mod: S$PBB,,, | Performed by: STUDENT IN AN ORGANIZED HEALTH CARE EDUCATION/TRAINING PROGRAM

## 2022-08-02 PROCEDURE — 99204 OFFICE O/P NEW MOD 45 MIN: CPT | Mod: S$PBB,TXP,, | Performed by: INTERNAL MEDICINE

## 2022-08-02 PROCEDURE — 99213 OFFICE O/P EST LOW 20 MIN: CPT | Mod: PBBFAC,27,TXP | Performed by: INTERNAL MEDICINE

## 2022-08-02 PROCEDURE — 99999 PR PBB SHADOW E&M-EST. PATIENT-LVL V: CPT | Mod: PBBFAC,,, | Performed by: STUDENT IN AN ORGANIZED HEALTH CARE EDUCATION/TRAINING PROGRAM

## 2022-08-02 PROCEDURE — 99999 PR PBB SHADOW E&M-EST. PATIENT-LVL IV: CPT | Mod: PBBFAC,TXP,, | Performed by: INTERNAL MEDICINE

## 2022-08-02 PROCEDURE — 99999 PR PBB SHADOW E&M-EST. PATIENT-LVL III: ICD-10-PCS | Mod: PBBFAC,TXP,, | Performed by: INTERNAL MEDICINE

## 2022-08-02 PROCEDURE — 99999 PR PBB SHADOW E&M-EST. PATIENT-LVL V: ICD-10-PCS | Mod: PBBFAC,,, | Performed by: STUDENT IN AN ORGANIZED HEALTH CARE EDUCATION/TRAINING PROGRAM

## 2022-08-02 PROCEDURE — 99214 OFFICE O/P EST MOD 30 MIN: CPT | Mod: S$PBB,TXP,, | Performed by: INTERNAL MEDICINE

## 2022-08-02 PROCEDURE — 99204 PR OFFICE/OUTPT VISIT, NEW, LEVL IV, 45-59 MIN: ICD-10-PCS | Mod: S$PBB,TXP,, | Performed by: INTERNAL MEDICINE

## 2022-08-02 PROCEDURE — 99999 PR PBB SHADOW E&M-EST. PATIENT-LVL IV: ICD-10-PCS | Mod: PBBFAC,TXP,, | Performed by: INTERNAL MEDICINE

## 2022-08-02 PROCEDURE — 99999 PR PBB SHADOW E&M-EST. PATIENT-LVL III: CPT | Mod: PBBFAC,TXP,, | Performed by: INTERNAL MEDICINE

## 2022-08-02 PROCEDURE — 99215 PR OFFICE/OUTPT VISIT, EST, LEVL V, 40-54 MIN: ICD-10-PCS | Mod: S$PBB,,, | Performed by: STUDENT IN AN ORGANIZED HEALTH CARE EDUCATION/TRAINING PROGRAM

## 2022-08-02 PROCEDURE — 99214 PR OFFICE/OUTPT VISIT, EST, LEVL IV, 30-39 MIN: ICD-10-PCS | Mod: S$PBB,TXP,, | Performed by: INTERNAL MEDICINE

## 2022-08-02 RX ORDER — POTASSIUM CHLORIDE 1500 MG/1
TABLET, EXTENDED RELEASE ORAL
Status: ON HOLD | COMMUNITY
End: 2022-11-02 | Stop reason: CLARIF

## 2022-08-02 RX ORDER — TRAMADOL HYDROCHLORIDE 50 MG/1
50 TABLET ORAL 2 TIMES DAILY PRN
Qty: 60 TABLET | Refills: 0 | Status: SHIPPED | OUTPATIENT
Start: 2022-08-02 | End: 2022-09-01

## 2022-08-02 RX ORDER — ERGOCALCIFEROL 1.25 MG/1
CAPSULE ORAL
COMMUNITY
End: 2022-11-08

## 2022-08-02 RX ORDER — TRAMADOL HYDROCHLORIDE 50 MG/1
50 TABLET ORAL 2 TIMES DAILY PRN
Qty: 60 TABLET | Refills: 0 | Status: SHIPPED | OUTPATIENT
Start: 2022-09-01 | End: 2022-09-07

## 2022-08-02 RX ORDER — FAMOTIDINE 20 MG/1
TABLET, FILM COATED ORAL
Status: ON HOLD | COMMUNITY
End: 2022-11-30 | Stop reason: HOSPADM

## 2022-08-02 RX ORDER — QUINIDINE GLUCONATE 324 MG
480 TABLET, EXTENDED RELEASE ORAL 2 TIMES DAILY WITH MEALS
Qty: 120 TABLET | Refills: 3 | Status: SHIPPED | OUTPATIENT
Start: 2022-08-02 | End: 2022-10-28 | Stop reason: SDUPTHER

## 2022-08-02 RX ORDER — ONDANSETRON 4 MG/1
TABLET, ORALLY DISINTEGRATING ORAL
Status: ON HOLD | COMMUNITY
End: 2022-11-02 | Stop reason: CLARIF

## 2022-08-02 NOTE — PROGRESS NOTES
Subjective:   Initial evaluation of liver transplant candidacy from cardiac risk standpoint.     HPI:  Mr. Diaz is a 46 y.o. year old Black or  male who has presents to be considered for unknown reason from cardiac standpoint for liver transplant evaluation.   Has had PET stress that is normal. TTE with normal LVEF, indeterminate diastolic function, with no evidence of elevated PASP in setting of normal TV.   In 2020 had sycnope x 2 in setting of what appears to have been hypovolemia, was asked to stop lasix after which resolved. In 2020 had stress test that was negative for ischemia as well. Was having difficult to control HTN after which had polypharmacy per notes, leading to these episodes. Was not orthostatic in ED at that time based on notes. Of note had alcohol relapse about May of this year. July 2022 was admitted with hepatic encephalopathy.   Of note presumptive positive THC on urine test 07/21/22.   Denies chest pain, chest pressure, additionally feels he does not remember having this happen. Mom states he was drinking at the time still too.   TTE 07/19/22:  · The left ventricle is mildly enlarged with normal systolic function.  · The estimated ejection fraction is 63%.  · Indeterminate left ventricular diastolic function.  · Normal right ventricular size with normal right ventricular systolic function.  · Mild left atrial enlargement.  · Mild right atrial enlargement.  · Trivial posterior pericardial effusion. And outside the RA.    Nuclear stress test:     Normal myocardial perfusion scan. There is no evidence of myocardial ischemia or infarction.    The gated perfusion images showed an ejection fraction of 58% at rest. The gated perfusion images showed an ejection fraction of 58% post stress. Normal ejection fraction is greater than 53%.    There is normal wall motion at rest and post stress.    LV cavity size is normal at rest and normal at stress.    The EKG portion of this  study is negative for ischemia.    The patient reported no chest pain during the stress test.    There were no arrhythmias during stress.    When compared to the previous study from 11/7/2020, there are no significant changes.    06/30/22 Holter:   · Normal sinus rhythm with a heart rate variable between 72 and 162 bpm, averaging 97 bpm  · There are extremely rare, isolated PVC's and one PVC couplet  · One single PAC is observed.    EKG 05/01/22:   Sinus tach  Otherwise normal    [place] [facility] [place]  1. Normal left ventricular systolic and diastolic functions with LVEF >55%.   2. Normal right ventricular systolic function.   3. Normal central venous pressure.   I10   Electronically Signed By: RL MATOS   Electronically Signed On: 2019/08/01 10:48:00      Past Medical History:   Diagnosis Date    Alcoholic cirrhosis of liver     Arthritis     ATN (acute tubular necrosis)     CHF (congestive heart failure)     Diverticulitis 01/2020    Esophageal varices     GERD (gastroesophageal reflux disease)     GI bleed     Hip arthritis     Left    Hypertension     Liver cirrhosis     Macrocytic anemia     Pulmonary embolism 08/2018    Unprovoked DVT.  Stop Coumadin due to GIB    Thrombocytopenia      Past Surgical History:   Procedure Laterality Date    ANKLE SURGERY      BLOCK, NERVE, PERIPHERAL Left 06/24/2022    Procedure: Left Hip femoral-obturator accessory nerve block;  Surgeon: Robbin De La Garza DO;  Location: [facility];  Service: Pain Management;  Laterality: Left;    COLON SURGERY  2007    COLONOSCOPY  09/05/2019    [facility]    COLONOSCOPY N/A 02/17/2021    Procedure: COLONOSCOPY;  Surgeon: Renea Billingsley MD;  Location: [facility];  Service: Endoscopy;  Laterality: N/A;    COLONOSCOPY W/ BIOPSIES  02/17/2021    ESOPHAGOGASTRODUODENOSCOPY N/A 07/26/2019    Procedure: EGD (ESOPHAGOGASTRODUODENOSCOPY);  Surgeon: Brian Trivedi MD;  Location: [facility];  Service: Endoscopy;  Laterality: N/A;     ESOPHAGOGASTRODUODENOSCOPY N/A 04/08/2020    Procedure: EGD (ESOPHAGOGASTRODUODENOSCOPY);  Surgeon: Donn Acuna MD;  Location: [facility];  Service: Endoscopy;  Laterality: N/A;    ESOPHAGOGASTRODUODENOSCOPY N/A 01/03/2021    Procedure: EGD (ESOPHAGOGASTRODUODENOSCOPY)- coffee ground emesis, hx varices;  Surgeon: Steve Chairez MD;  Location: [facility];  Service: Endoscopy;  Laterality: N/A;    ESOPHAGOGASTRODUODENOSCOPY N/A 05/03/2021    Procedure: EGD (ESOPHAGOGASTRODUODENOSCOPY);  Surgeon: Jeanmarie Ngo MD;  Location: [facility];  Service: Endoscopy;  Laterality: N/A;    ESOPHAGOGASTRODUODENOSCOPY N/A 07/19/2021    Procedure: ESOPHAGOGASTRODUODENOSCOPY (EGD);  Surgeon: Claudio Medeiros MD;  Location: [facility];  Service: Endoscopy;  Laterality: N/A;    ESOPHAGOGASTRODUODENOSCOPY  12/20/2021    ESOPHAGOGASTRODUODENOSCOPY N/A 12/20/2021    Procedure: EGD (ESOPHAGOGASTRODUODENOSCOPY);  Surgeon: Ajay Jackson MD;  Location: [facility];  Service: Endoscopy;  Laterality: N/A;    ESOPHAGOGASTRODUODENOSCOPY N/A 05/03/2022    Procedure: EGD (ESOPHAGOGASTRODUODENOSCOPY);  Surgeon: Brian Trivedi MD;  Location: [facility];  Service: Endoscopy;  Laterality: N/A;    ESOPHAGOGASTRODUODENOSCOPY N/A 7/7/2022    Procedure: EGD (ESOPHAGOGASTRODUODENOSCOPY);  Surgeon: Ajay Jackson MD;  Location: [facility];  Service: Endoscopy;  Laterality: N/A;    HERNIA REPAIR Left     Inguinal    UPPER GASTROINTESTINAL ENDOSCOPY N/A 07/07/2022       Family History   Problem Relation Age of Onset    Hypertension Mother     Breast cancer Mother     Hypertension Father     Prostate cancer Father     Bladder Cancer Father        Review of Systems   Constitutional: Positive for weight gain. Negative for chills, decreased appetite, diaphoresis, fever, malaise/fatigue and weight loss.   HENT: Negative for congestion.    Eyes: Negative for blurred vision and visual disturbance.   Cardiovascular: Negative for  chest pain, dyspnea on exertion, irregular heartbeat, leg swelling, near-syncope, orthopnea, palpitations, paroxysmal nocturnal dyspnea and syncope.   Respiratory: Negative for cough, shortness of breath, sleep disturbances due to breathing, snoring and wheezing.    Hematologic/Lymphatic: Negative for bleeding problem. Does not bruise/bleed easily.   Skin: Negative for poor wound healing and rash.   Musculoskeletal: Negative for arthritis, joint pain and muscle weakness.   Gastrointestinal: Negative for bloating, abdominal pain, anorexia, constipation, diarrhea, hematemesis, hematochezia, melena, nausea and vomiting.   Genitourinary: Negative for frequency and urgency.   Neurological: Negative for difficulty with concentration, excessive daytime sleepiness, dizziness, headaches, light-headedness and weakness.   Psychiatric/Behavioral: Negative for depression. The patient does not have insomnia and is not nervous/anxious.        Objective:   Blood pressure (!) 148/75, pulse 75, height 6' (1.829 m), weight 86.5 kg (190 lb 11.2 oz).body mass index is 25.86 kg/m².    Physical Exam  Vitals and nursing note reviewed.   Constitutional:       General: He is not in acute distress.     Appearance: He is well-developed. He is not diaphoretic.   HENT:      Head: Normocephalic and atraumatic.   Eyes:      General:         Right eye: No discharge.         Left eye: No discharge.      Conjunctiva/sclera: Conjunctivae normal.   Neck:      Vascular: No JVD.   Cardiovascular:      Rate and Rhythm: Normal rate and regular rhythm.      Heart sounds: No murmur heard.    No friction rub. No gallop.   Pulmonary:      Effort: Pulmonary effort is normal.      Breath sounds: Normal breath sounds. No wheezing or rales.   Chest:      Chest wall: No tenderness.   Abdominal:      General: Bowel sounds are normal. There is distension.      Palpations: Abdomen is soft. There is no mass.      Tenderness: There is no abdominal tenderness. There is  no guarding or rebound.      Comments: ascites   Musculoskeletal:         General: No tenderness. Normal range of motion.      Cervical back: Normal range of motion and neck supple.   Skin:     General: Skin is warm and dry.      Findings: No erythema or rash.   Neurological:      Mental Status: He is alert and oriented to person, place, and time.   Psychiatric:         Behavior: Behavior normal.         Thought Content: Thought content normal.         Judgment: Judgment normal.         Labs:      Chemistry        Component Value Date/Time     (L) 07/21/2022 0756     09/02/2018 0508    K 3.8 07/21/2022 0756     07/21/2022 0756     09/02/2018 0508    CO2 19 (L) 07/21/2022 0756    BUN 17 07/21/2022 0756    CREATININE 2.2 (H) 07/22/2022 1142    CREATININE 0.91 09/02/2018 0508     07/21/2022 0756    GLU 99 09/02/2018 0508        Component Value Date/Time    CALCIUM 9.4 07/21/2022 0756    ALKPHOS 84 07/21/2022 0756    AST 53 (H) 07/21/2022 0756    ALT 23 07/21/2022 0756    BILITOT 8.4 (H) 07/21/2022 0756    ESTGFRAFRICA 39.8 (A) 07/21/2022 0756    EGFRNONAA 34.4 (A) 07/21/2022 0756          Magnesium   Date Value Ref Range Status   06/08/2021 2.0 1.6 - 2.6 mg/dL Final     Lab Results   Component Value Date    WBC 9.61 07/21/2022    HGB 8.4 (L) 07/21/2022    HCT 26.6 (L) 07/21/2022     (H) 07/21/2022    PLT 84 (L) 07/21/2022     BNP   Date Value Ref Range Status   07/18/2022 105 (H) 0 - 99 pg/mL Final     Comment:     Values of less than 100 pg/ml are consistent with non-CHF populations.   03/07/2022 146 (H) 0 - 99 pg/mL Final     Comment:     Values of less than 100 pg/ml are consistent with non-CHF populations.   07/21/2021 618 (H) 0 - 99 pg/mL Final     Comment:     Values of less than 100 pg/ml are consistent with non-CHF populations.     No results found for this or any previous visit.      Labs were reviewed with the patient.    Assessment:      1. Encounter for pre-transplant  evaluation for liver transplant    2. Organ transplant candidate    3. Ascites due to alcoholic cirrhosis    4. Alcoholic cirrhosis of liver with ascites    5. Essential hypertension    6. Stage 3 chronic kidney disease, unspecified whether stage 3a or 3b CKD        Plan:   Patient with slightly elevated blood pressure today in clinic, but not checking at home.  Recommend he check at home for now and see what it runs. Reach out to pcp re: this.   Would recommend blood pressure management by primary care or general cardiology. Not sure I see evidence of diastolic heart failure- not really had admission per patient for heart failure, although there was a question of this in June 2021 at [facility] in [place]; do not see evidence of this on echo from [facility] either from 2019. Had decompensated liver cirrhosis at the same time, not sure this is an accurate diagnosis. That being said, at this time, from stress test being negative and echo being essentially normal, do not see cardiac reason for him to not proceed with liver transplant listing. Recommend adequate blood pressure and volume management perioperatively.  Will reach out to liver team to confirm if there is something we are not seeing in regards to why he is referred to us or if they have a specific request for further testing.   Thank you for allowing us to participate in the cardiovascular management of this patient. We look forward to partnering in their care. Please contact us with any questions or concerns you may have regarding this patient.     Ruth Calderon MD

## 2022-08-02 NOTE — PATIENT INSTRUCTIONS
IS BUPRENORPHINE RIGHT FOR MY CHRONIC PAIN?    What is buprenorphine?   Buprenorphine is used to treat chronic pain. It works similarly to other common opioid pain medications, such as oxycodone, hydrocodone or morphine.  While these medications can help manage pain, they can also cause other side effects such as confusion or slowing of your breathing while you sleep. Over time, patients can develop tolerance to these medications, making them less effective at the prescribed dose.      Why would I want to try something new?   Unlike other pain medications that cause bothersome side effects, buprenorphine works on different receptors in the body. Patients typically report fewer side effects, like mental cloudiness, constipation and it is better for patients at risk for slowed breathing e.g., those with obstructive sleep apnea.     Will this medication treat my chronic pain?  Buprenorphine, in the film or patch form, is a medication that can be used to treat chronic pain. Similar to other pain medications, you will be placed on a starting dose that may be increased depending on how your body responds to this medication. It may take a few days to several weeks to determine your best dose for your optimal function and pain relief. You will work closely with your physician and healthcare team during this process. Many patients achieve pain relief with this medication.     How do I take buprenorphine?   Form  Name  Where       Directions         Patch  Butrans® Upper arm, chest, back, or  sides of your chest Place on a dry clean area for 7 days. Place somewhere you can easily visualize (I.e. chest or deltoid).  You may shower or bathe, but avoid hot tubs and heating pads. Use the disposal pouch to get rid of used patches.    Film  Belbuca® Inside your mouth on your cheek   Use a dry finger to remove film from package and place inside your mouth against your cheek for 5-10 seconds until dissolved. Do not chew, swallow,  touch or move the film. Do not eat or drink until after it dissolves.     Are there side effects from using buprenorphine?  Buprenorphine has the potential to cause similar side effects as other opioid medications such as sedation, dizziness, constipation, or decreasing your breathing. However, these effects are likely to be less severe than compared to other opioids like Norco or oxycodone.     I heard buprenorphine is used for people addicted to pain medication. Is that why Im using it?  Buprenorphine is one of the main ingredients in Suboxone® which is a medication used to treat patients with opioid dependence. However, buprenorphine can also be used on its own help patients with chronic pain only. We often use the same medications to treat multiple conditions when we treat chronic pain and many medications often have multiple uses.     Can I take my oxycodone, morphine or other opioid medication while on buprenorphine?  When using buprenorphine, it is important to not use other opioid medications like oxycodone or morphine unless advised by your physician. Buprenorphine is meant to replace these medications and if taken together, can increase the risk of slowing down your breathing and death.  Buprenorphine should also not be used with alcohol or anti-anxiety medications like alprazolam (Xanax) or clonazepam (Klonopin). You can still take other pain medications such as duloxetine, gabapentin, baclofen, ibuprofen, or acetaminophen with buprenorphine after speaking with your doctor.

## 2022-08-02 NOTE — Clinical Note
Recommendations: -  Labs today:  CBC, CMP, PT INR -  Ferrous gluconate 480 mg twice daily x 3 months -  Low salt in the diet, avoid canned, bottled and processed foods. -  Continue current meds -  Avoid alcohol, smoking, sedatives and meds with codeine. -  Avoid high intake of Tylenol (more than 4 extra-strength pills in one day) -  Call us if any bleeding, fevers, confusion, disorientation occur -  Return in a month to see Dr Arreaga

## 2022-08-02 NOTE — PROGRESS NOTES
Pre-liver transplant education provided via slide show in exam room of transplant clinic.  E-consents obtained by Laura Whittaker LPN and can be found under the Media tab.  ANANT and HIV consent submitted to ISMAEL Acosta MA for scanning.

## 2022-08-02 NOTE — PROGRESS NOTES
Chronic Pain - f/u    Referring Physician: No ref. provider found    Date: 08/02/2022     Re: Irvin Diaz Jr.  MR#: [id number]  YOB: 1975  Age: 46 y.o.    Chief Complaint:   Chief Complaint   Patient presents with    Leg Pain    Groin Pain    Hip Pain     left     **This note is dictated using the M*Modal Fluency Direct word recognition program. There are word recognition mistakes that are occasionally missed on review.**    ASSESSMENT: 46 y.o. year old male with left hip pain, consistent with     1. Chronic pain syndrome  POCT RAPID DRUG SCREEN    traMADoL (ULTRAM) 50 mg tablet    traMADoL (ULTRAM) 50 mg tablet    Pain Clinic Drug Screen    CANCELED: Pain Clinic Drug Screen   2. Chronic, continuous use of opioids  POCT RAPID DRUG SCREEN    Pain Clinic Drug Screen    CANCELED: Pain Clinic Drug Screen   3. Avascular necrosis of hip, left  POCT RAPID DRUG SCREEN    traMADoL (ULTRAM) 50 mg tablet    traMADoL (ULTRAM) 50 mg tablet   4. Chronic left hip pain     5. Thrombocytopenia     6. Other long term (current) drug therapy   POCT RAPID DRUG SCREEN    Pain Clinic Drug Screen    CANCELED: Pain Clinic Drug Screen         PLAN:     Avascular necrosis of the left hip   -s/p hip block without significant pain relief and complicated by Hematoma.   -Not a candidate for RFA due to complications from the block  -discussed with patient that transplant was trying to get ahold of him because of his low hematocrit and hgb. He went to the ED and got transfused  -Refill Tramadol #60 BID PRN x2 months  -not surgical candidate  -Not a candidate for further procedures due to bleeding risk  -Weird dreams from oxycodone. STOP this.  -Sign pain contract and perform UDS today.  -discussed buprenorphine. Need to see how it interacts in liver failure, and might interfere with pain control after liver transplant surgery    Thorombocytopenia 2/2 cirrhosis  -platelets are 41 on 5/4/22, 92 on 5/26/22  -Avanos recommended  above 50.    Cirrhosis 2/2 past EtOH  -following with hepatology  -Qualifies for liver transplant.  Going through the process now.    Kidney disease  -GFR 54    - RTC 2 months  - Counseled patient regarding the importance of  activity modification.    The above plan and management options were discussed at length with patient. Patient is in agreement with the above and verbalized understanding. It will be communicated with the referring physician via electronic record, fax, or mail.  Lab/study reports reviewed were important and necessary because subsequent medical and treatment recommendations required review of the above lab/study reports. Images viewed/reviewed above were important and necessary because subsequent medical and treatment recommendations required review of the reviewed image(s).     Electronically signed by:  Robbin De La Garza DO  08/02/2022    =========================================================================================================    SUBJECTIVE:    Interval History 8/2/2022:     Irvin Estradasenia Gudino is a 46 y.o. male presents to the clinic for follow up.  Since last visit the pain has is unchanged.  He stopped the Oxycodone because it gave severe, lucid dreams.  Ran out of tramadol and has not been taking anything for his pain.    The pain is located in the left hip area and radiates to the left knee/ groin.  The pain is described as aching, shooting and stabbing    At BEST  8/10   At WORST  10/10 on the WORST day.    On average pain is rated as 8/10.   Today the pain is rated as 8/10  Symptoms interfere with daily activity, sleeping and work.   Exacerbating factors: Standing, Walking and Getting out of bed/chair.    Mitigating factors nothing, heat, ice, medications and rest.     Current pain medications: none  Failed Pain Medications: cannot take NSAIDs due to gastric bleeding, cannot take tylenol due to liver failure.     Pain procedures:  6/24/22 - left hip block - no  relief. C/b hematoma formation    Interval History 7/5/2022:     Irvin Diaz Jr. is a 46 y.o. male presents to the clinic for follow up.  Since last visit the pain has is unchanged.  He is s/p hip block that was complicated by hematoma in the pectineus muscle.  His Hgb has dropped, although the hematoma is resolving.  Recommended that patient go to ED as recommended by transplant.    The pain is located in the left hip area and radiates to the left leg/ groin .  The pain is described as aching, shooting and stabbing    At BEST  8/10   At WORST  10/10 on the WORST day.    On average pain is rated as 8/10.   Today the pain is rated as 8/10  Symptoms interfere with daily activity, sleeping and work.   Exacerbating factors: Standing, Walking and Getting out of bed/chair.    Mitigating factors nothing, heat, ice, medications and rest.     Current pain medications: none  Failed Pain Medications: cannot take NSAIDs due to gastric bleeding, cannot take tylenol due to liver failure.     Pain procedures:  6/24/22 - left hip block - no relief. C/b hematoma formation    Interval History 5/30/2022:     Irvin Diaz Jr. is a 46 y.o. male presents to the clinic for follow up.  Since last visit the pain has is unchanged. He missed his original procedure date due to not checking his voicemail and transportation issues. He is still interested in the procedure.  WE had a long talk about bleeding risk with his low platelets and high INR.    The pain is located in the left hip area and radiates to the left leg/groin.  The pain is described as aching, shooting and stabbing    At BEST  8/10   At WORST  10/10 on the WORST day.    On average pain is rated as 8/10.   Today the pain is rated as 8/10  Symptoms interfere with daily activity, sleeping and work.   Exacerbating factors: walking.    Mitigating factors nothing.     Initial Hx:  Irvin Diaz Jr. is a 46 y.o. male presents to the clinic for the evaluation of left  hip pain. The pain started 3 years ago following fall and symptoms have been worsening. The patient states used to get hip injections of the left hip.  He is unable to walk without a cane.  He states that he was told after imaging that he had a fracture in 2019.  He had an injection (and aspiration) in December/january and it helps the pain for about 3 weeks.  After the injections he still needs the pain but it helps.  The ortho physician at [facility]    CT note about the hip:  There is collapse of the left femoral head superior portion with bone-on-bone as well as underlying femoral sclerotic changes suggesting AVN with severe secondary degenerative changes of the acetabulum and the left hip effusion stable since prior exam.     The patient says that he has seen a ortho surgeon in the past and that they are unable to do surgery due to his liver failure.    Pain Description:    The pain is located in the left hip area and radiates to the left leg/ groin area.    At BEST  8/10   At WORST  10/10 on the WORST day.    On average pain is rated as 8/10.   Today the pain is rated as 8/10  The pain is continuous.  The pain is described as aching, shooting and throbbing    Symptoms interfere with daily activity, sleeping and work.   Exacerbating factors: Standing, Laying, Bending, Walking, Night Time, Morning, Flexing, Lifting and Getting out of bed/chair.    Mitigating factors laying down and medications.   He reports 5 hours of sleep per night.    Physical Therapy/Home Exercise: No, not currently in physical therapy or home exercise program    Current Pain Medications:    - none    Failed Pain Medications:    - cannot take NSAIDs due to gastric bleeding, cannot take tylenol due to liver failure.     Pain Treatment Therapies:    Pain procedures: hip injections  Physical Therapy: physical therapy made things worse  Chiropractor: none  Acupuncture: none  TENS unit: none  Spinal decompression: none  Joint  replacement: none    Patient denies urinary incontinence, bowel incontinence and loss of sensations. (+) weakness in the left leg  Patient denies any suicidal or homicidal ideations     report:  Reviewed and consistent with medication use as prescribed.    Imaging:   XR abdomen 03/2022:  Please note the hemidiaphragms are not included in their entirety.  Multiple surgical clips and presumed anastomotic suture line project over the right abdomen.  There are a few mildly prominent loops of gas-filled small bowel loops present measuring up to 3.3 cm in the left abdomen.  Limited evaluation of free intraperitoneal air to patient positioning/technique.  Calcifications project over the pelvis, likely phleboliths.  Osseous structures demonstrate significant degenerative change of the left hip with chronic appearing deformity/collapse of the left femoral head.    CT abdomen 03/2022:  Mild circumferential wall thickening involving the proximal colon extending from the ileocolic anastomosis at the hepatic flexure through around the level of the splenic flexure suggests inflammatory or infectious colitis.  No convincing transmural inflammation is seen.     Postoperative changes of proximal colectomy and scattered mild diverticulosis of the more distal colon.  No convincing diverticulitis, bowel obstruction, free air or abscess.     Findings of cirrhosis and mild abdominal ascites as described essentially stable.     Left hip findings suggesting AVN as described.     This report was flagged in Epic as abnormal.     No other significant or acute findings.       Past Medical History:   Diagnosis Date    Alcoholic cirrhosis of liver     Arthritis     ATN (acute tubular necrosis)     CHF (congestive heart failure)     Diverticulitis 01/2020    Esophageal varices     GERD (gastroesophageal reflux disease)     GI bleed     Hip arthritis     Left    Hypertension     Liver cirrhosis     Macrocytic anemia     Pulmonary  embolism 08/2018    Unprovoked DVT.  Stop Coumadin due to GIB    Thrombocytopenia      Past Surgical History:   Procedure Laterality Date    ANKLE SURGERY      BLOCK, NERVE, PERIPHERAL Left 06/24/2022    Procedure: Left Hip femoral-obturator accessory nerve block;  Surgeon: Robbin De La Garza DO;  Location: [facility];  Service: Pain Management;  Laterality: Left;    COLON SURGERY  2007    COL[facility]OSCOPY  09/05/2019    [facility]    COLONOSCOPY N/A 02/17/2021    Procedure: COLONOSCOPY;  Surgeon: Renea Billingsley MD;  Location: [facility];  Service: Endoscopy;  Laterality: N/A;    COLONOSCOPY W/ BIOPSIES  02/17/2021    ESOPHAGOGASTRODUODENOSCOPY N/A 07/26/2019    Procedure: EGD (ESOPHAGOGASTRODUODENOSCOPY);  Surgeon: Brian Trivedi MD;  Location: [facility];  Service: Endoscopy;  Laterality: N/A;    ESOPHAGOGASTRODUODENOSCOPY N/A 04/08/2020    Procedure: EGD (ESOPHAGOGASTRODUODENOSCOPY);  Surgeon: Donn Acuna MD;  Location: [facility];  Service: Endoscopy;  Laterality: N/A;    ESOPHAGOGASTRODUODENOSCOPY N/A 01/03/2021    Procedure: EGD (ESOPHAGOGASTRODUODENOSCOPY)- coffee ground emesis, hx varices;  Surgeon: Steve Chairez MD;  Location: [facility];  Service: Endoscopy;  Laterality: N/A;    ESOPHAGOGASTRODUODENOSCOPY N/A 05/03/2021    Procedure: EGD (ESOPHAGOGASTRODUODENOSCOPY);  Surgeon: Jeanmarie Ngo MD;  Location: [facility];  Service: Endoscopy;  Laterality: N/A;    ESOPHAGOGASTRODUODENOSCOPY N/A 07/19/2021    Procedure: ESOPHAGOGASTRODUODENOSCOPY (EGD);  Surgeon: Claudio Medeiros MD;  Location: [facility];  Service: Endoscopy;  Laterality: N/A;    ESOPHAGOGASTRODUODENOSCOPY  12/20/2021    ESOPHAGOGASTRODUODENOSCOPY N/A 12/20/2021    Procedure: EGD (ESOPHAGOGASTRODUODENOSCOPY);  Surgeon: Ajay Jackson MD;  Location: [facility];  Service: Endoscopy;  Laterality: N/A;    ESOPHAGOGASTRODUODENOSCOPY N/A 05/03/2022    Procedure: EGD (ESOPHAGOGASTRODUODENOSCOPY);  Surgeon: Brian Trivedi,  MD;  Location: [facility];  Service: Endoscopy;  Laterality: N/A;    ESOPHAGOGASTRODUODENOSCOPY N/A 2022    Procedure: EGD (ESOPHAGOGASTRODUODENOSCOPY);  Surgeon: Ajay Jackson MD;  Location: [facility];  Service: Endoscopy;  Laterality: N/A;    HERNIA REPAIR Left     Inguinal    UPPER GASTROINTESTINAL ENDOSCOPY N/A 2022     Social History     Socioeconomic History    Marital status: Legally    Tobacco Use    Smoking status: Former Smoker     Types: Cigarettes     Quit date:      Years since quittin.6    Smokeless tobacco: Never Used    Tobacco comment: quit 20 years ago   Substance and Sexual Activity    Alcohol use: Not Currently     Comment: freq    Drug use: Yes     Types: Marijuana     Comment: occasionally    Sexual activity: Yes     Comment: occ     Social Determinants of Health     Financial Resource Strain: Low Risk     Difficulty of Paying Living Expenses: Not very hard   Food Insecurity: No Food Insecurity    Worried About Running Out of Food in the Last Year: Never true    Ran Out of Food in the Last Year: Never true   Transportation Needs: No Transportation Needs    Lack of Transportation (Medical): No    Lack of Transportation (Non-Medical): No   Physical Activity: Inactive    Days of Exercise per Week: 0 days    Minutes of Exercise per Session: 0 min   Stress: No Stress Concern Present    Feeling of Stress : Not at all   Social Connections: Socially Isolated    Frequency of Communication with Friends and Family: Three times a week    Frequency of Social Gatherings with Friends and Family: Three times a week    Attends Mandaeism Services: Never    Active Member of Clubs or Organizations: No    Attends Club or Organization Meetings: Never    Marital Status:    Housing Stability: Low Risk     Unable to Pay for Housing in the Last Year: No    Number of Places Lived in the Last Year: 1    Unstable Housing in the Last Year: No     Family  History   Problem Relation Age of Onset    Hypertension Mother     Breast cancer Mother     Hypertension Father     Prostate cancer Father     Bladder Cancer Father        Review of patient's allergies indicates:   Allergen Reactions    Ciprofloxacin hcl Hallucinations    Meperidine Hives     Pt medicated w/multiple medications (demerol, protonix, and zofran)  w/i 10 mins time frame prior to developing localized hives near IV site that med was administered. Pt reports he has/takes all other medications on daily basis.     Morphine Itching    Nsaids (non-steroidal anti-inflammatory drug)      Kidney Disease    Oxycodone      Weird, lucid dreams    Tylenol [acetaminophen]      Hx of liver disease       Current Outpatient Medications   Medication Sig    carvediloL (COREG) 6.25 MG tablet Take 1 tablet (6.25 mg total) by mouth Daily.    ergocalciferol (ERGOCALCIFEROL) 50,000 unit Cap 1 tablet    famotidine (PEPCID) 20 MG tablet 1 tablet at bedtime as needed    folic acid (FOLVITE) 1 MG tablet Take 1 tablet (1 mg total) by mouth once daily.    gabapentin (NEURONTIN) 300 MG capsule Take 1 capsule (300 mg total) by mouth every evening for 3 days, THEN 1 capsule (300 mg total) 2 (two) times daily for 3 days, THEN 1 capsule (300 mg total) 3 (three) times daily for 24 days. (Patient taking differently: Take 1 capsule by mouth three times daily)    lactulose (CHRONULAC) 10 gram/15 mL solution Take 45 mL (30 g total) by mouth 3 (three) times daily.    LORazepam (ATIVAN) 0.5 MG tablet Take 1 tablet (0.5 mg total) by mouth every 6 (six) hours as needed for Anxiety.    ondansetron (ZOFRAN-ODT) 4 MG TbDL 1 tablet on the tongue and allow to dissolve    pantoprazole (PROTONIX) 40 MG tablet Take 1 tablet (40 mg total) by mouth once daily. (Patient taking differently: Take 40 mg by mouth 2 (two) times daily.)    potassium chloride (K-TAB) 20 mEq 1 tablet with food    rifAXIMin (XIFAXAN) 550 mg Tab Take 1 tablet  (550 mg total) by mouth 2 (two) times daily.    sildenafiL (VIAGRA) 100 MG tablet Take 1 tablet (100 mg total) by mouth daily as needed for Erectile Dysfunction.    sucralfate (CARAFATE) 1 gram tablet Take 1 g by mouth 4 (four) times daily.    thiamine 100 MG tablet Take 1 tablet (100 mg total) by mouth once daily.    triamcinolone acetonide 0.1% (KENALOG) 0.1 % ointment Apply topically 2 (two) times daily. So not apply to face or anogenital region (Patient taking differently: Apply topically 2 (two) times daily as needed (itching). do not apply to face or anogenital region)    ferrous gluconate (FERGON) 240 (27 FE) MG tablet Take 2 tablets (480 mg total) by mouth 2 (two) times daily with meals.    traMADoL (ULTRAM) 50 mg tablet Take 1 tablet (50 mg total) by mouth 2 (two) times daily as needed for Pain.    [START ON 9/1/2022] traMADoL (ULTRAM) 50 mg tablet Take 1 tablet (50 mg total) by mouth 2 (two) times daily as needed for Pain.     No current facility-administered medications for this visit.       REVIEW OF SYSTEMS:    GENERAL:  No weight loss, malaise or fevers.   HEENT:   No recent changes in vision or hearing   NECK:  Negative for lumps, no difficulty with swallowing.  RESPIRATORY:  Negative for cough, wheezing or shortness of breath, patient denies any recent URI.  CARDIOVASCULAR:  Negative for chest pain, leg swelling or palpitations.  GI:  Negative for abdominal discomfort, blood in stools or black stools or change in bowel habits.  MUSCULOSKELETAL:  See HPI.  SKIN:  Negative for lesions, rash, and itching. + skin itching  PSYCH:  No mood disorder or recent psychosocial stressors.  Patients sleep is not disturbed secondary to pain.  HEMATOLOGY/LYMPHOLOGY:  Negative for prolonged bleeding, bruising easily or swollen nodes.  Patient is not currently taking any anti-coagulants  NEURO:   No history of headaches, syncope, paralysis, seizures or tremors.  All other reviewed and negative other than  HPI.    OBJECTIVE:    BP (!) 156/86   Pulse 72   Resp 18   Ht 6' (1.829 m)   Wt 86.2 kg (190 lb)   BMI 25.77 kg/m²     PHYSICAL EXAMINATION:    GENERAL: Fraile, in no acute distress, alert and oriented x3.  PSYCH:  Mood and affect appropriate.  SKIN: Skin color, texture, turgor normal, no rashes or lesions on visible skin.  HEAD/FACE:  Normocephalic, atraumatic. Cranial nerves grossly intact.  CV: RRR with palpation of the radial artery.  PULM: CTAB. No evidence of respiratory difficulty, symmetric chest rise.  GI:  Soft    MUSKULOSKELETAL:    EXTREMITIES:   Left Hip Exam (limited ROM and exam 2/2/ pain)  - Log Roll Positive  - FADIR Positive  - Stinchfield Unable to Perform  - Hip Scour Unable to Perform  - GTB Tenderness Positive   -TTP over anterior and posterior hip joint  - TTP of the superior knee joint.    MUSCULOSKELETAL:  Atrophy of the left leg compared to the right  No deformities, edema, or skin discoloration are noted on visible skin. Good capillary refill.     NEURO: Bilateral upper and lower extremity coordination and muscle stretch reflexes are physiologic and symmetric.      NEUROLOGICAL EXAM:  MENTAL STATUS: A x O x 3, good concentration, speech is fluent and goal directed  MEMORY: recent and remote are intact  CN: CN2-12 grossly intact  MOTOR: 5/5 in all muscle groups on the right. HF 3/5 on the left, 4/5 KE, 4/5 KF with pain  DTRs: 3+ intact symmetric patella and 2 + achilles  Sensation:    -yes Loss of sensation in a left lower L-1 and L-2 on the left distribution.  Babinski: absent     Gait: Cane, abnormal. Short stride. Favors left leg

## 2022-08-02 NOTE — LETTER
August 2, 2022        Brian Trivedi  [address]  [address]  [facility] [address]  Phone: [phone]  Fax: [phone]     Cyrus Gauthier  [address] [facility] [address]  Phone: [phone]  Fax: [phone]             [id number]  [address]  [place] [address]  Phone: [phone]   Patient: Irvin Diaz Jr.   MR Number: [id number]   YOB: 1975   Date of Visit: 8/2/2022       Dear Dr. Cyrus Gauthier, Brian Trivedi    Thank you for referring Irvin Diaz to me for evaluation. Attached you will find relevant portions of my assessment and plan of care.    If you have questions, please do not hesitate to call me. I look forward to following Irvin Diaz along with you.    Sincerely,    Ruth Calderon MD    Enclosure    If you would like to receive this communication electronically, please contact [email or web address] or [phone] to request [employer] access.    [employer] is a tool which provides read-only access to select patient information with whom you have a relationship. Its easy to use and provides real time access to review your patients record including encounter summaries, notes, results, and demographic information.    If you feel you have received this communication in error or would no longer like to receive these types of communications, please e-mail [email or web address]

## 2022-08-02 NOTE — PROGRESS NOTES
"   [facility] Consultation Note    Reason for Consult:  The primary encounter diagnosis was Iron deficiency anemia due to chronic blood loss. A diagnosis of Alcoholic ketoacidosis was also pertinent to this visit.    PCP: Edouard Gibson   [address] [place] [address]    HPI:  This is a 46 y.o. male here for evaluation of: post-hosp discharge visit:      Admission Date: 7/6/2022  Hospital Length of Stay: 0 days  Discharge Date and Time:  07/07/2022 2:31 PM        HPI:   Irvin Diaz Jr. is a 46 y.o.  male, previously seen by Dr. Arreaga about 5-6 weeks ago, who  has a past medical history of Alcoholic cirrhosis of liver, HTN, ATN, CHF, Diverticulitis (01/2020), Esophageal varices, GERD, GI bleeds from varices and ulcer, Hip arthritis, Liver cirrhosis, Macrocytic anemia, Pulmonary embolism (08/2018), and Thrombocytopenia. The patient presented to [facility] on 7/6/2022 with a primary complaint of Referral (Reports awaiting liver transplant, was told by transplant DrDavid To come to ED after blood work showed "low" blood count, count of 6.5. +generalized weakness, denies any known bleeding. Reports having been transfused in past. )     The patient was in their usual state of health until he was instructed to come to hospital for low blood counts. He denies melena or hematochezia. Reports persistent hip pain. He is asymptomatic.     Procedure(s) (LRB):  EGD (ESOPHAGOGASTRODUODENOSCOPY) (N/A)      Hospital Course:   Patient was admitted for symptomatic anemia/GI bleed.  Patient was transfused 2 units of PRBC and H&H trended up appropriately and remained stable.  GI consulted and underwent EGD - findings:  Small (< 5 mm) esophageal varices with no bleeding and no stigmata of recent bleeding, Portal hypertensive gastropathy  - GI recommended surveillance follow-up in 6 month with EGD.      Since being home from [facility], feels "fine but tired".  Had Fracture of left " hip about 4 yrs ago, healing on its own, but will consider doing something after the liver transplant.  Also has osteoporosis.     Not drinking anymore.  Last drink was a week before 6/30/22, i.e. 6/23/22.   Will start AA now and join the intensive outpatient program (IOP) 9/6/22.      HE improved tremendously.  Took lactulose 45 mL TID at the start, then down to twice/day, then down to once day, then stopped altogether about a week ago.  Mother with patient states he is doing fine off the lactulose. Asking if he can take 30 mL once daily.  And I have said OK, with his mom will keep an eye on him.  He not able to get Insurance approval for xifaxan.  Therefore, not taking it.    Weaning down gabapentin (not helping to control pain).       gabapentin 300 MG capsule  Commonly known as: NEURONTIN  Take 1 capsule (300 mg total) by mouth every evening for 3 days, THEN 1 capsule (300 mg total) 2 (two) times daily for 3 days, THEN 1 capsule (300 mg total) 3 (three) times daily for 24 days.  Start taking on: June 30, 2022      multivitamin Tab  Take 1 tablet by mouth once daily.      potassium chloride SA 20 MEQ tablet  Commonly known as: K-DUR,KLOR-CON  Take 1 tablet (20 mEq total) by mouth once daily.      rifAXIMin 550 mg Tab  Commonly known as: XIFAXAN  Take 1 tablet (550 mg total) by mouth 2 (two) times daily.      sucralfate 1 gram tablet  Commonly known as: CARAFATE  Take 1 g by mouth 4 (four) times daily.      thiamine 100 MG tablet  Take 1 tablet (100 mg total) by mouth once daily.      traMADoL 50 mg tablet  Commonly known as: ULTRAM  Take 1 tablet (50 mg total) by mouth every 12 (twelve) hours as needed for Pain.      triamcinolone acetonide 0.1% 0.1 % ointment  Commonly known as: KENALOG  Apply topically 2 (two) times daily. So not apply to face or anogenital region          STOP taking these medications    acamprosate 333 mg tablet  Commonly known as: CAMPRAL      furosemide 40 MG tablet  Commonly known as:  LASIX      NIFEdipine 30 MG (OSM) 24 hr tablet  Commonly known as: PROCARDIA-XL      spironolactone 50 MG tablet  Commonly known as: ALDACTONE                Significant Diagnostic Studies: see chart         Pending Diagnostic Studies:      None          Indwelling Lines/Drains at time of discharge:       Lines/Drains/Airways      None                   BP (!) 140/85 (BP Location: Left arm, Patient Position: Lying)   Pulse 79   Temp 98.2 °F (36.8 °C)   Resp 20   Ht 6' (1.829 m)   Wt 84.7 kg (186 lb 11.7 oz)   SpO2 98%   BMI 25.33 kg/m²   Physical Exam  Vitals and nursing note reviewed.   Constitutional:       General: He is not in acute distress.  HENT:      Head: Normocephalic and atraumatic.   Cardiovascular:      Rate and Rhythm: Normal rate and regular rhythm.      Heart sounds: Normal heart sounds.   Pulmonary:      Effort: Pulmonary effort is normal.      Breath sounds: Normal breath sounds.   Neurological:      Mental Status: He is alert and oriented to person, place, and time.        Medical History:  has a past medical history of Alcoholic cirrhosis of liver, Arthritis, ATN (acute tubular necrosis), CHF (congestive heart failure), Diverticulitis (01/2020), Esophageal varices, GERD (gastroesophageal reflux disease), GI bleed, Hip arthritis, Hypertension, Liver cirrhosis, Macrocytic anemia, Pulmonary embolism (08/2018), and Thrombocytopenia.    Surgical History:  has a past surgical history that includes Hernia repair (Left); Colon surgery (2007); Esophagogastroduodenoscopy (N/A, 07/26/2019); Ankle surgery; Colonoscopy (09/05/2019); Esophagogastroduodenoscopy (N/A, 04/08/2020); Esophagogastroduodenoscopy (N/A, 01/03/2021); Colonoscopy w/ biopsies (02/17/2021); Colonoscopy (N/A, 02/17/2021); Esophagogastroduodenoscopy (N/A, 05/03/2021); Esophagogastroduodenoscopy (N/A, 07/19/2021); Esophagogastroduodenoscopy (12/20/2021); Esophagogastroduodenoscopy (N/A, 12/20/2021); Esophagogastroduodenoscopy (N/A,  05/03/2022); block, nerve, peripheral (Left, 06/24/2022); Upper gastrointestinal endoscopy (N/A, 07/07/2022); and Esophagogastroduodenoscopy (N/A, 7/7/2022).    Family History: family history includes Bladder Cancer in his father; Breast cancer in his mother; Hypertension in his father and mother; Prostate cancer in his father..     Social History:  reports that he quit smoking about 22 years ago. His smoking use included cigarettes. He has never used smokeless tobacco. He reports previous alcohol use. He reports current drug use. Drug: Marijuana.    Review of patient's allergies indicates:   Allergen Reactions    Ciprofloxacin hcl Hallucinations    Meperidine Hives     Pt medicated w/multiple medications (demerol, protonix, and zofran)  w/i 10 mins time frame prior to developing localized hives near IV site that med was administered. Pt reports he has/takes all other medications on daily basis.     Morphine Itching    Nsaids (non-steroidal anti-inflammatory drug)      Kidney Disease    Oxycodone      Weird, lucid dreams    Tylenol [acetaminophen]      Hx of liver disease       Current Outpatient Rx   Medication Sig Dispense Refill    carvediloL (COREG) 6.25 MG tablet Take 1 tablet (6.25 mg total) by mouth Daily. 30 tablet 5    ergocalciferol (ERGOCALCIFEROL) 50,000 unit Cap 1 tablet      famotidine (PEPCID) 20 MG tablet 1 tablet at bedtime as needed      ferrous gluconate (FERGON) 240 (27 FE) MG tablet Take 2 tablets (480 mg total) by mouth 2 (two) times daily with meals. 120 tablet 3    folic acid (FOLVITE) 1 MG tablet Take 1 tablet (1 mg total) by mouth once daily. 30 tablet 5    gabapentin (NEURONTIN) 300 MG capsule Take 1 capsule (300 mg total) by mouth every evening for 3 days, THEN 1 capsule (300 mg total) 2 (two) times daily for 3 days, THEN 1 capsule (300 mg total) 3 (three) times daily for 24 days. (Patient taking differently: Take 1 capsule by mouth three times daily) 81 capsule 0     lactulose (CHRONULAC) 10 gram/15 mL solution Take 45 mL (30 g total) by mouth 3 (three) times daily. 4050 mL 0    LORazepam (ATIVAN) 0.5 MG tablet Take 1 tablet (0.5 mg total) by mouth every 6 (six) hours as needed for Anxiety. 5 tablet 0    ondansetron (ZOFRAN-ODT) 4 MG TbDL 1 tablet on the tongue and allow to dissolve      pantoprazole (PROTONIX) 40 MG tablet Take 1 tablet (40 mg total) by mouth once daily. (Patient taking differently: Take 40 mg by mouth 2 (two) times daily.) 30 tablet 11    potassium chloride (K-TAB) 20 mEq 1 tablet with food      rifAXIMin (XIFAXAN) 550 mg Tab Take 1 tablet (550 mg total) by mouth 2 (two) times daily. 60 tablet 6    sildenafiL (VIAGRA) 100 MG tablet Take 1 tablet (100 mg total) by mouth daily as needed for Erectile Dysfunction. 30 tablet 5    sucralfate (CARAFATE) 1 gram tablet Take 1 g by mouth 4 (four) times daily.      thiamine 100 MG tablet Take 1 tablet (100 mg total) by mouth once daily. 30 tablet 5    traMADoL (ULTRAM) 50 mg tablet Take 1 tablet (50 mg total) by mouth 2 (two) times daily as needed for Pain. 60 tablet 0    [START ON 9/1/2022] traMADoL (ULTRAM) 50 mg tablet Take 1 tablet (50 mg total) by mouth 2 (two) times daily as needed for Pain. 60 tablet 0    triamcinolone acetonide 0.1% (KENALOG) 0.1 % ointment Apply topically 2 (two) times daily. So not apply to face or anogenital region (Patient taking differently: Apply topically 2 (two) times daily as needed (itching). do not apply to face or anogenital region) 30 g 1       Objective Findings:    Vital Signs:  BP (!) 160/81 (BP Location: Right arm, Patient Position: Sitting, BP Method: Medium (Automatic))   Pulse 70   Temp 98 °F (36.7 °C)   Resp 18   Ht 6' (1.829 m)   Wt 87.9 kg (193 lb 12.6 oz)   SpO2 100%   BMI 26.28 kg/m²   Body mass index is 26.28 kg/m².      Labs:  Lab Results   Component Value Date    WBC 9.61 07/21/2022    HGB 8.4 (L) 07/21/2022    HCT 26.6 (L) 07/21/2022    PLT 84 (L)  07/21/2022    CHOL 251 (H) 10/27/2020    TRIG 52 10/27/2020    HDL 62 10/27/2020    INR 1.4 (H) 07/21/2022    CREATININE 2.2 (H) 07/22/2022    BUN 17 07/21/2022    BILITOT 8.4 (H) 07/21/2022    ALT 23 07/21/2022    AST 53 (H) 07/21/2022    ALKPHOS 84 07/21/2022     (L) 07/21/2022    K 3.8 07/21/2022     07/21/2022    CO2 19 (L) 07/21/2022    TSH 1.280 07/21/2022    PSA 1.2 07/21/2022    HGBA1C 5.7 11/12/2020    AFP 3.1 06/30/2022       Imaging:       Endoscopy:      Assessment:  1. Iron deficiency anemia due to chronic blood loss    2. Alcoholic ketoacidosis    Alcoholic hepatitis, cirrhosis - recovering well  Encephalopathy - improving  No labs done for this visit.      Recommendations:  -  Labs today:  CBC, CMP, PT INR  -  Ferrous gluconate 480 mg twice daily x 3 months  -  Low salt in the diet, avoid canned, bottled and processed foods.  -  Continue current meds  -  Avoid alcohol, smoking, sedatives and meds with codeine.  -  Avoid high intake of Tylenol (more than 4 extra-strength pills in one day)  -  Call us if any bleeding, fevers, confusion, disorientation occur  -  Return in a month to see Dr Arreaga      No follow-ups on file.      Order summary:  No orders of the defined types were placed in this encounter.      Thank you so much for allowing me to participate in the care of Irvin Veronica MD

## 2022-08-03 ENCOUNTER — TELEPHONE (OUTPATIENT)
Dept: TRANSPLANT | Facility: CLINIC | Age: 47
End: 2022-08-03
Payer: MEDICARE

## 2022-08-03 ENCOUNTER — TELEPHONE (OUTPATIENT)
Dept: PRIMARY CARE CLINIC | Facility: CLINIC | Age: 47
End: 2022-08-03
Payer: MEDICARE

## 2022-08-03 NOTE — TELEPHONE ENCOUNTER
----- Message from Susi Bush sent at 8/3/2022 10:49 AM CDT -----  Contact: [phone]  Pt is calling he states he stopped drinking about 2 months ago and he is itching like crazy and he states he has has a liver condition and he can not take benadryl  please advise and give return call

## 2022-08-03 NOTE — TELEPHONE ENCOUNTER
"Returned call to patient who reports "itching all over" and is unrelieved with Benadryl.  He called his PCP about this and was told to contact transplant.  Will discuss with Dr Arreaga and get back with him.  Patient verbalized understanding.     ----- Message from James Pat sent at 8/3/2022 11:19 AM CDT -----  Regarding: call back  Pt call to speak with Naina in regards to some meds for itching requesting call back    Call       "

## 2022-08-05 ENCOUNTER — PATIENT MESSAGE (OUTPATIENT)
Dept: TRANSPLANT | Facility: CLINIC | Age: 47
End: 2022-08-05
Payer: MEDICARE

## 2022-08-05 DIAGNOSIS — L29.9 ITCHING: ICD-10-CM

## 2022-08-05 DIAGNOSIS — Z76.82 ORGAN TRANSPLANT CANDIDATE: Primary | ICD-10-CM

## 2022-08-05 RX ORDER — CHOLESTYRAMINE 4 G/9G
4 POWDER, FOR SUSPENSION ORAL 2 TIMES DAILY
Qty: 180 PACKET | Refills: 3 | Status: SHIPPED | OUTPATIENT
Start: 2022-08-05 | End: 2022-10-04 | Stop reason: ALTCHOICE

## 2022-08-09 NOTE — DISCHARGE SUMMARY
DISCHARGE SUMMARY  Hospital Medicine    Team: [facility]    Patient Name: Irvin Diaz Jr.  YOB: 1975    Admit Date: 7/18/2022    Discharge Date: 7/20/2022    Discharge Attending Physician: Chanel Hernández     Admitting Resident    Diagnoses:  Active Hospital Problems    Diagnosis  POA    *Alcohol use disorder, severe, dependence [F10.20]  Yes     Chronic    Encounter for pre-transplant evaluation for liver transplant [Z01.818]  Not Applicable    Hepatic encephalopathy [K72.90]  Yes    Alcoholic cirrhosis of liver [K70.30]  Yes      Resolved Hospital Problems   No resolved problems to display.       Discharged Condition: admit problems have stabilized       HOSPITAL COURSE:      Initial Presentation:    Irvin Diaz Jr. is a 46 y.o. male with history of decompensated EtOH cirrhosis (HE), CHF, GERD, HTN who presents for AMS. Was in usual state of health up until a few days ago when mom reported he did not feel like himself and seemed confused. Pt reports he stopped drinking 1-2 weeks ago and had some symptoms of withdrawals at the time but seems to confuse timeline when interviewed. Reports thinking he was coming to hospital today to get a DEXA scan. Denies melena or hematochezia. No fevers, chills, dysuria.        Course of Principle Problem for Admission:    Irvin Diaz Jr. is a 46 y.o. male with history of decompensated EtOH cirrhosis (HE), CHF, GERD, HTN who presents for AMS concerning for HE. Symptomatically improved with lactulose overnight. Noted to have new hyperbilirubinemia as well, US negative for mass or PVT. ENA evident as well, would benefit from nephrology input. MELD noted to be 25 on admission. Would benefit from addiction psych and SW consult to assist with transplant evaluation process but otherwise can continue outpatient.     Problem List:  1. Hepatic encephalopathy, improved  2. Jaundice  3. ENA on CKD resolved        Recommendations:  - Addiction  psychiatry and SW consults  - Noted recent elevated PETH after outpatient hepatology appt  - Will defer further transplant eval workup to outpatient at this time  - Continue lactulose, titrate to 2-3 BMs per day  - Agree with diagnostic paracentesis- no SBP        CONSULTS: hepatology     Last CBC/BMP/HgbA1c (if applicable):  Recent Results (from the past 336 hour(s))   CBC Auto Differential    Collection Time: 08/02/22  3:30 PM   Result Value Ref Range    WBC 7.11 3.90 - 12.70 K/uL    Hemoglobin 8.2 (L) 14.0 - 18.0 g/dL    Hematocrit 25.3 (L) 40.0 - 54.0 %    Platelets 97 (L) 150 - 450 K/uL     No results found for this or any previous visit (from the past 336 hour(s)).  Lab Results   Component Value Date    HGBA1C 5.7 11/12/2020       Disposition:  Home     Future Scheduled Appointments:  Future Appointments   Date Time Provider Department Center   8/15/2022 11:30 AM María Silva PA-C [facility] ID [facility]   8/15/2022 12:35 PM LAB, APPOINTMENT [facility] LAB VNP [facility]   8/15/2022 12:40 PM SPECIMEN, [facility] SPECLAB [facility]   8/18/2022  1:30 PM Ambreen Hou MD [facility] NEPHRO [facility]   9/7/2022  2:00 PM [facility]   9/8/2022 10:00 AM Robbin De La Garza DO [facility] PAINMGT [facility]   9/27/2022 10:00 AM Robbin De La Garza DO [facility] PAINMG [place]   10/25/2022 10:00 AM Jacek Arreaga MD [facility] HEPAT [facility]   12/30/2022 10:30 AM Edouard Gibson MD [facility]       Follow-up Plans from This Hospitalization:  Per above list    Discharge Medication List:       Medication List      START taking these medications    lactulose 10 gram/15 mL solution  Commonly known as: CHRONULAC  Take 45 mL (30 g total) by mouth 3 (three) times daily.     LORazepam 0.5 MG tablet  Commonly known as: ATIVAN  Take 1 tablet (0.5 mg total) by mouth every 6 (six) hours as needed for Anxiety.        CHANGE how you take these medications    gabapentin 300 MG  capsule  Commonly known as: NEURONTIN  Take 1 capsule (300 mg total) by mouth every evening for 3 days, THEN 1 capsule (300 mg total) 2 (two) times daily for 3 days, THEN 1 capsule (300 mg total) 3 (three) times daily for 24 days.  Start taking on: June 30, 2022  What changed: See the new instructions.     pantoprazole 40 MG tablet  Commonly known as: PROTONIX  Take 1 tablet (40 mg total) by mouth once daily.  What changed: when to take this     triamcinolone acetonide 0.1% 0.1 % ointment  Commonly known as: KENALOG  Apply topically 2 (two) times daily. So not apply to face or anogenital region  What changed:   · when to take this  · reasons to take this  · additional instructions        CONTINUE taking these medications    carvediloL 6.25 MG tablet  Commonly known as: COREG  Take 1 tablet (6.25 mg total) by mouth Daily.     folic acid 1 MG tablet  Commonly known as: FOLVITE  Take 1 tablet (1 mg total) by mouth once daily.     rifAXIMin 550 mg Tab  Commonly known as: XIFAXAN  Take 1 tablet (550 mg total) by mouth 2 (two) times daily.     sucralfate 1 gram tablet  Commonly known as: CARAFATE     thiamine 100 MG tablet  Take 1 tablet (100 mg total) by mouth once daily.        STOP taking these medications    acamprosate 333 mg tablet  Commonly known as: CAMPRAL     diphenhydrAMINE 25 mg capsule  Commonly known as: BENADRYL     furosemide 40 MG tablet  Commonly known as: LASIX     NIFEdipine 30 MG (OSM) 24 hr tablet  Commonly known as: PROCARDIA-XL     potassium chloride SA 20 MEQ tablet  Commonly known as: K-DURKLOR-CON     spironolactone 50 MG tablet  Commonly known as: ALDACTONE     traMADoL 50 mg tablet  Commonly known as: ULTRAM           Where to Get Your Medications      These medications were sent to [facility] [address] [facility] [address]    Hours: Mon-Fri 7a-7p, Sat-Sun 10a-4p Phone: [phone]   · lactulose 10 gram/15 mL solution  · LORazepam 0.5 MG tablet         Patient  Instructions:  Discharge Procedure Orders   Ambulatory referral/consult to Hepatology   Standing Status: Future   Referral Priority: Routine Referral Type: Consultation   Referral Reason: Specialty Services Required   Requested Specialty: Hepatology   Number of Visits Requested: 1     Ambulatory referral/consult to Nephrology   Standing Status: Future   Referral Priority: Routine Referral Type: Consultation   Referral Reason: Specialty Services Required   Requested Specialty: Nephrology   Number of Visits Requested: 1       Signing Physician:  Chanel Hernández MD

## 2022-08-12 ENCOUNTER — TELEPHONE (OUTPATIENT)
Dept: TRANSPLANT | Facility: CLINIC | Age: 47
End: 2022-08-12
Payer: MEDICARE

## 2022-08-12 DIAGNOSIS — Z76.82 ORGAN TRANSPLANT CANDIDATE: Primary | ICD-10-CM

## 2022-08-12 NOTE — TELEPHONE ENCOUNTER
Spoke to pt regarding itching, patient is taking 4gm of cholestyramin BID. Recommended patient try oatmeal soap and lotion. Pt has appt with ID Monday and will be presented to committee Wednesday, pt will have repeat PETH Monday.

## 2022-08-15 ENCOUNTER — LAB VISIT (OUTPATIENT)
Dept: LAB | Facility: HOSPITAL | Age: 47
End: 2022-08-15
Attending: INTERNAL MEDICINE
Payer: MEDICARE

## 2022-08-15 ENCOUNTER — OFFICE VISIT (OUTPATIENT)
Dept: INFECTIOUS DISEASES | Facility: CLINIC | Age: 47
End: 2022-08-15
Payer: MEDICARE

## 2022-08-15 ENCOUNTER — CLINICAL SUPPORT (OUTPATIENT)
Dept: INFECTIOUS DISEASES | Facility: CLINIC | Age: 47
End: 2022-08-15
Payer: MEDICARE

## 2022-08-15 ENCOUNTER — TELEPHONE (OUTPATIENT)
Dept: TRANSPLANT | Facility: CLINIC | Age: 47
End: 2022-08-15
Payer: MEDICARE

## 2022-08-15 VITALS
SYSTOLIC BLOOD PRESSURE: 128 MMHG | BODY MASS INDEX: 25.59 KG/M2 | TEMPERATURE: 99 F | DIASTOLIC BLOOD PRESSURE: 70 MMHG | HEIGHT: 72 IN | WEIGHT: 188.94 LBS

## 2022-08-15 DIAGNOSIS — Z86.711 HISTORY OF PULMONARY EMBOLUS (PE): Chronic | ICD-10-CM

## 2022-08-15 DIAGNOSIS — Z76.82 ORGAN TRANSPLANT CANDIDATE: ICD-10-CM

## 2022-08-15 DIAGNOSIS — I10 ESSENTIAL HYPERTENSION: Chronic | ICD-10-CM

## 2022-08-15 DIAGNOSIS — K74.69 OTHER CIRRHOSIS OF LIVER: ICD-10-CM

## 2022-08-15 DIAGNOSIS — Z87.19 HISTORY OF GI BLEED: Chronic | ICD-10-CM

## 2022-08-15 DIAGNOSIS — D62 ACUTE BLOOD LOSS ANEMIA: ICD-10-CM

## 2022-08-15 DIAGNOSIS — N13.30 HYDRONEPHROSIS OF RIGHT KIDNEY: ICD-10-CM

## 2022-08-15 DIAGNOSIS — R17 JAUNDICE: ICD-10-CM

## 2022-08-15 DIAGNOSIS — K76.82 HEPATIC ENCEPHALOPATHY: ICD-10-CM

## 2022-08-15 DIAGNOSIS — K70.31 ASCITES DUE TO ALCOHOLIC CIRRHOSIS: ICD-10-CM

## 2022-08-15 DIAGNOSIS — I50.32 CHRONIC DIASTOLIC CHF (CONGESTIVE HEART FAILURE): ICD-10-CM

## 2022-08-15 DIAGNOSIS — E80.6 HYPERBILIRUBINEMIA: ICD-10-CM

## 2022-08-15 DIAGNOSIS — N18.1 CKD (CHRONIC KIDNEY DISEASE) STAGE 1, GFR 90 ML/MIN OR GREATER: ICD-10-CM

## 2022-08-15 DIAGNOSIS — D68.9 COAGULOPATHY: ICD-10-CM

## 2022-08-15 LAB
ALBUMIN SERPL BCP-MCNC: 2.8 G/DL (ref 3.5–5.2)
ALP SERPL-CCNC: 74 U/L (ref 55–135)
ALT SERPL W/O P-5'-P-CCNC: 17 U/L (ref 10–44)
AMMONIA PLAS-SCNC: 89 UMOL/L (ref 10–50)
ANION GAP SERPL CALC-SCNC: 7 MMOL/L (ref 8–16)
ANION GAP SERPL CALC-SCNC: 7 MMOL/L (ref 8–16)
AST SERPL-CCNC: 40 U/L (ref 10–40)
BASOPHILS # BLD AUTO: 0.02 K/UL (ref 0–0.2)
BASOPHILS # BLD AUTO: 0.02 K/UL (ref 0–0.2)
BASOPHILS NFR BLD: 0.4 % (ref 0–1.9)
BASOPHILS NFR BLD: 0.4 % (ref 0–1.9)
BILIRUB SERPL-MCNC: 3.6 MG/DL (ref 0.1–1)
BUN SERPL-MCNC: 14 MG/DL (ref 6–20)
BUN SERPL-MCNC: 14 MG/DL (ref 6–20)
CALCIUM SERPL-MCNC: 9.2 MG/DL (ref 8.7–10.5)
CALCIUM SERPL-MCNC: 9.2 MG/DL (ref 8.7–10.5)
CHLORIDE SERPL-SCNC: 109 MMOL/L (ref 95–110)
CHLORIDE SERPL-SCNC: 109 MMOL/L (ref 95–110)
CHOLEST SERPL-MCNC: 146 MG/DL (ref 120–199)
CHOLEST/HDLC SERPL: 4.9 {RATIO} (ref 2–5)
CO2 SERPL-SCNC: 22 MMOL/L (ref 23–29)
CO2 SERPL-SCNC: 22 MMOL/L (ref 23–29)
CREAT SERPL-MCNC: 1.8 MG/DL (ref 0.5–1.4)
CREAT SERPL-MCNC: 1.8 MG/DL (ref 0.5–1.4)
DIFFERENTIAL METHOD: ABNORMAL
DIFFERENTIAL METHOD: ABNORMAL
EOSINOPHIL # BLD AUTO: 0.3 K/UL (ref 0–0.5)
EOSINOPHIL # BLD AUTO: 0.3 K/UL (ref 0–0.5)
EOSINOPHIL NFR BLD: 5.2 % (ref 0–8)
EOSINOPHIL NFR BLD: 5.2 % (ref 0–8)
ERYTHROCYTE [DISTWIDTH] IN BLOOD BY AUTOMATED COUNT: 14.9 % (ref 11.5–14.5)
ERYTHROCYTE [DISTWIDTH] IN BLOOD BY AUTOMATED COUNT: 14.9 % (ref 11.5–14.5)
EST. GFR  (NO RACE VARIABLE): 46.1 ML/MIN/1.73 M^2
EST. GFR  (NO RACE VARIABLE): 46.1 ML/MIN/1.73 M^2
GLUCOSE SERPL-MCNC: 109 MG/DL (ref 70–110)
GLUCOSE SERPL-MCNC: 109 MG/DL (ref 70–110)
HCT VFR BLD AUTO: 27.9 % (ref 40–54)
HCT VFR BLD AUTO: 27.9 % (ref 40–54)
HDLC SERPL-MCNC: 30 MG/DL (ref 40–75)
HDLC SERPL: 20.5 % (ref 20–50)
HGB BLD-MCNC: 9.5 G/DL (ref 14–18)
HGB BLD-MCNC: 9.5 G/DL (ref 14–18)
IMM GRANULOCYTES # BLD AUTO: 0.01 K/UL (ref 0–0.04)
IMM GRANULOCYTES # BLD AUTO: 0.01 K/UL (ref 0–0.04)
IMM GRANULOCYTES NFR BLD AUTO: 0.2 % (ref 0–0.5)
IMM GRANULOCYTES NFR BLD AUTO: 0.2 % (ref 0–0.5)
LDLC SERPL CALC-MCNC: 98 MG/DL (ref 63–159)
LYMPHOCYTES # BLD AUTO: 1.1 K/UL (ref 1–4.8)
LYMPHOCYTES # BLD AUTO: 1.1 K/UL (ref 1–4.8)
LYMPHOCYTES NFR BLD: 22.9 % (ref 18–48)
LYMPHOCYTES NFR BLD: 22.9 % (ref 18–48)
MCH RBC QN AUTO: 36 PG (ref 27–31)
MCH RBC QN AUTO: 36 PG (ref 27–31)
MCHC RBC AUTO-ENTMCNC: 34.1 G/DL (ref 32–36)
MCHC RBC AUTO-ENTMCNC: 34.1 G/DL (ref 32–36)
MCV RBC AUTO: 106 FL (ref 82–98)
MCV RBC AUTO: 106 FL (ref 82–98)
MONOCYTES # BLD AUTO: 0.6 K/UL (ref 0.3–1)
MONOCYTES # BLD AUTO: 0.6 K/UL (ref 0.3–1)
MONOCYTES NFR BLD: 12.3 % (ref 4–15)
MONOCYTES NFR BLD: 12.3 % (ref 4–15)
NEUTROPHILS # BLD AUTO: 2.8 K/UL (ref 1.8–7.7)
NEUTROPHILS # BLD AUTO: 2.8 K/UL (ref 1.8–7.7)
NEUTROPHILS NFR BLD: 59 % (ref 38–73)
NEUTROPHILS NFR BLD: 59 % (ref 38–73)
NONHDLC SERPL-MCNC: 116 MG/DL
NRBC BLD-RTO: 0 /100 WBC
NRBC BLD-RTO: 0 /100 WBC
PLATELET # BLD AUTO: 82 K/UL (ref 150–450)
PLATELET # BLD AUTO: 82 K/UL (ref 150–450)
PMV BLD AUTO: 10.8 FL (ref 9.2–12.9)
PMV BLD AUTO: 10.8 FL (ref 9.2–12.9)
POTASSIUM SERPL-SCNC: 3.9 MMOL/L (ref 3.5–5.1)
POTASSIUM SERPL-SCNC: 3.9 MMOL/L (ref 3.5–5.1)
PROT SERPL-MCNC: 8.6 G/DL (ref 6–8.4)
RBC # BLD AUTO: 2.64 M/UL (ref 4.6–6.2)
RBC # BLD AUTO: 2.64 M/UL (ref 4.6–6.2)
SODIUM SERPL-SCNC: 138 MMOL/L (ref 136–145)
SODIUM SERPL-SCNC: 138 MMOL/L (ref 136–145)
TRIGL SERPL-MCNC: 90 MG/DL (ref 30–150)
TSH SERPL DL<=0.005 MIU/L-ACNC: 1.68 UIU/ML (ref 0.4–4)
WBC # BLD AUTO: 4.8 K/UL (ref 3.9–12.7)
WBC # BLD AUTO: 4.8 K/UL (ref 3.9–12.7)

## 2022-08-15 PROCEDURE — 80321 ALCOHOLS BIOMARKERS 1OR 2: CPT | Mod: NTX | Performed by: INTERNAL MEDICINE

## 2022-08-15 PROCEDURE — 84443 ASSAY THYROID STIM HORMONE: CPT | Mod: NTX | Performed by: FAMILY MEDICINE

## 2022-08-15 PROCEDURE — 85025 COMPLETE CBC W/AUTO DIFF WBC: CPT | Mod: NTX | Performed by: INTERNAL MEDICINE

## 2022-08-15 PROCEDURE — 80061 LIPID PANEL: CPT | Mod: TXP | Performed by: FAMILY MEDICINE

## 2022-08-15 PROCEDURE — 99204 PR OFFICE/OUTPT VISIT, NEW, LEVL IV, 45-59 MIN: ICD-10-PCS | Mod: S$PBB,TXP,, | Performed by: PHYSICIAN ASSISTANT

## 2022-08-15 PROCEDURE — 99999 PR PBB SHADOW E&M-EST. PATIENT-LVL IV: ICD-10-PCS | Mod: PBBFAC,TXP,, | Performed by: PHYSICIAN ASSISTANT

## 2022-08-15 PROCEDURE — 99999 PR PBB SHADOW E&M-EST. PATIENT-LVL IV: CPT | Mod: PBBFAC,TXP,, | Performed by: PHYSICIAN ASSISTANT

## 2022-08-15 PROCEDURE — 80053 COMPREHEN METABOLIC PANEL: CPT | Mod: NTX | Performed by: FAMILY MEDICINE

## 2022-08-15 PROCEDURE — 36415 COLL VENOUS BLD VENIPUNCTURE: CPT | Mod: TXP | Performed by: FAMILY MEDICINE

## 2022-08-15 PROCEDURE — 99204 OFFICE O/P NEW MOD 45 MIN: CPT | Mod: S$PBB,TXP,, | Performed by: PHYSICIAN ASSISTANT

## 2022-08-15 PROCEDURE — 99214 OFFICE O/P EST MOD 30 MIN: CPT | Mod: PBBFAC,TXP | Performed by: PHYSICIAN ASSISTANT

## 2022-08-15 PROCEDURE — 90750 HZV VACC RECOMBINANT IM: CPT | Mod: PBBFAC,TXP

## 2022-08-15 PROCEDURE — 82140 ASSAY OF AMMONIA: CPT | Mod: TXP | Performed by: FAMILY MEDICINE

## 2022-08-15 NOTE — TELEPHONE ENCOUNTER
Psychosocial Follow Up     contacted patient for follow up regarding recommendations made by SW team during transplant evaluation. Patient had previously reported last alcohol use as 1-2 weeks prior to evaluation in July 2022. Patient had previously attended IOP and had some months of sobriety but relapsed. Patient noted [facility].     During this phone call, patient states he will be starting [facility] on September 6th (unconfirmed) and he has been attending online AA meetings 2-3x per week (has not kept logs). SW advised patient to start logging meetings and sending SW confirmation. Patient verbalizes understanding. SW to also confirm patient's enrollment with IOP when patient begins program. Patient denies any other changes or concerns at this time. Patient's suitability remains high risk due to patient's last alcohol use being July 2022 (positive peth 135 on 7/18/2022). Patient also has history of relapse.     SW remains available and will continue to follow, providing psychosocial support, education and assistance as needed.

## 2022-08-15 NOTE — PROGRESS NOTES
Pre Transplant Infectious Diseases Consult  Liver Transplant Recipient Evaluation    Requesting Physician: Dr. Arreaga    Reason for Visit:  Pre Transplant Evaluation    Organ:  Liver    Etiology of Liver Disease:  Alcohol cirrhosis c/b GI bleed, esophageal varices, hepatic encephalopathy (now off of lactulose). dehnies  Not on lactulose at this time. Denies ascites or paracentesis. Denies SBP.     History of Prior Transplant:  No    Currently taking immunosuppressants/steroids:  No    History of Splenectomy:  No    Infectious History:  Current/recent infections or currently taking antibiotics?  No  History of recurrent infections (sinuses, throat, bladder/kidneys, intestines, skin, dental, lung, catheter (HD/PD) related, or peritonitis/SBP)?  No  Sees PCP for his eczema on ankles. Steroid ointment.   Any major hospitalizations due to infection?  None   If diabetic, history of diabetic foot infection/osteomyelitis?  No  History of shingles?  No  History of STDs (syphilis, viral hepatitis, HIV)?  Yes - STDS in the past. No recurrences   Exposure to TB or ever had a positive TB skin test?  No  History of residence in coccidioides endemic areas ([facility]?  No  Any foreign travel?  No  Any associated illness?  No    Social/Environmental:  Occupational:     Animal exposures (dogs, cats, farm animals, bird cages, fish tanks):  No  Hobbies (gardening, hike, fish/hunting, etc): none  Consumption of raw/undercooked meat or seafood?  No  Any injectable or smoked recreational drug use?  Yes last used two months ago smoking marijuana.       Immunization History   Administered Date(s) Administered    COVID-19, MRNA, LN-S, PF (Pfizer) (Purple Cap) 03/11/2021, 04/01/2021    Hepatitis A, Adult 10/08/2019, 08/18/2020    Hepatitis B, Adult 10/08/2019, 08/18/2020    Influenza - Quadrivalent - PF *Preferred* (6 months and older) 10/06/2020    Pneumococcal Conjugate - 13 Valent 08/18/2020     Pneumococcal Conjugate - 20 Valent 06/06/2022    Td - PF (ADULT) 06/06/2022    Zoster Recombinant 08/15/2022       Immunization History:  Childhood vaccines:  Yes  Shingles (Zostavax/Shingrix):  Other:     Serologies:  CMV IgG Interpretation   Date Value Ref Range Status   07/21/2022 Reactive (A) Non-Reactive Final     Hepatitis A Antibody IgG   Date Value Ref Range Status   07/21/2022 Positive  Final     Comment:     IgG anti-HAV detected. The presence of IgG anti-HAV  implies past infection (recent or distant) or   vaccination against HAV. Detectable levels above  the assay cutoff suggest immunity to HAV infection.       Hep B Core Total Ab   Date Value Ref Range Status   07/21/2022 Negative  Final     Hep B S Ab   Date Value Ref Range Status   07/21/2022 Positive  Final     Comment:     Individual is considered immune to HBV infection.     Hepatitis B Surface Ag   Date Value Ref Range Status   07/21/2022 Negative Negative Final     HIV 1/2 Ag/Ab   Date Value Ref Range Status   07/21/2022 Negative Negative Final     TB Gold Plus   Date Value Ref Range Status   07/21/2022 Negative Negative Final     Comment:     M. tuberculosis infection NOT likely.     RPR   Date Value Ref Range Status   07/21/2022 Non-reactive Non-reactive Final     Strongyloides Ab IgG   Date Value Ref Range Status   07/21/2022 Negative Negative Final     Comment:     No detectable levels of IgG antibodies to Strongyloides.  Repeat testing in 1-2 weeks if clinically indicated.    Test Performed by:  [facility]  [address] [place] [address]  : Tony Owen M.D. Ph.D.; CLIA# [id number]       Varicella Interpretation   Date Value Ref Range Status   07/21/2022 Positive (A) Negative Final     Comment:     <or=0.8    Negative        No detectable IgG antibody to Varicella zoster  by the DEANDRE test. Such individuals are presumed to be   uninfected with Varicella zoster and to be  susceptible to   primary infection.    0.9-1.0    Equivocal    >or=1.1    Positive        Indicates presence of detectable IgG antibody to Varicella   zoster by the DEANDRE test. Indicative of previous or current   infection.          Review of Systems   Constitutional: Positive for malaise/fatigue. Negative for chills, decreased appetite, fever, night sweats, weight gain and weight loss.   HENT: Negative for congestion, ear pain, hearing loss, hoarse voice, sore throat and tinnitus.    Eyes: Negative for blurred vision, pain, vision loss in left eye, vision loss in right eye and visual disturbance.   Cardiovascular: Negative for chest pain, dyspnea on exertion, leg swelling and palpitations.   Respiratory: Negative for cough, shortness of breath, sputum production and wheezing.    Skin: Negative for dry skin, itching, rash and suspicious lesions.   Musculoskeletal: Negative for back pain, joint pain, myalgias and neck pain.   Gastrointestinal: Negative for abdominal pain, constipation, diarrhea, heartburn, nausea and vomiting.   Genitourinary: Negative for dysuria, flank pain, frequency, hematuria, hesitancy and urgency.   Neurological: Negative for dizziness, headaches, numbness, paresthesias and weakness.   Psychiatric/Behavioral: Negative for depression and memory loss. The patient does not have insomnia and is not nervous/anxious.      Physical Exam  Vitals and nursing note reviewed.   Constitutional:       General: He is not in acute distress.     Appearance: He is well-developed. He is not diaphoretic.   HENT:      Head: Normocephalic and atraumatic.   Eyes:      Pupils: Pupils are equal, round, and reactive to light.   Cardiovascular:      Rate and Rhythm: Normal rate and regular rhythm.      Heart sounds: Normal heart sounds. No murmur heard.    No friction rub. No gallop.   Pulmonary:      Effort: Pulmonary effort is normal. No respiratory distress.      Breath sounds: Normal breath sounds. No wheezing or  rales.   Chest:      Chest wall: No tenderness.   Abdominal:      General: Bowel sounds are normal. There is no distension.      Palpations: There is no mass.      Tenderness: There is no abdominal tenderness. There is no guarding.   Musculoskeletal:         General: No deformity. Normal range of motion.      Cervical back: Normal range of motion and neck supple.   Skin:     General: Skin is warm and dry.      Findings: No erythema or rash.      Comments: Eczema of right ankle    Neurological:      Mental Status: He is alert and oriented to person, place, and time.   Psychiatric:         Behavior: Behavior normal.         Thought Content: Thought content normal.         Judgment: Judgment normal.         Counseling:   I discussed with the patient the risk for increased susceptibility to infections following transplantation including increased risk for infection right after transplant and if rejection should occur.  The patient has been counseled on the importance of vaccinations including but not limited to a yearly flu vaccine. Patient was also instructed to encourage that family/caretakers receive their flu vaccine yearly. The patient was encouraged to contact us about any problems that may develop after immunizations and possible side effects were reviewed.     Specific guidance has been provided to the patient regarding the patient's occupation, hobbies and activities to avoid future infectious complications. These include but are not limited to: avoiding raw/undercooked meats and seafood, avoiding unpasteurized milk/cheeses, proper (hand) hygiene, contact with animals and appropriate vaccination of animals, use of mosquito/tick precautions, avoiding walking barefoot, avoiding sick contacts, and seeking medical advice prior to foreign travel (specifically developing countries).     Transplant Candidacy: Based on available information, there are no identified significant barriers to transplantation from an  infectious disease standpoint pending acceptable serologies and subject to recommendations below.     Final determination of transplant candidacy will be made once evaluation is complete and reviewed by the Transplant Selection Committee.    ID recommendations:     Quantiferon gold, HIV, Strongyloides, and RPR pending. If positive, please refer to ID clinic.    Shingrix today, 2-6 months  Influenza

## 2022-08-16 ENCOUNTER — TELEPHONE (OUTPATIENT)
Dept: TRANSPLANT | Facility: CLINIC | Age: 47
End: 2022-08-16
Payer: MEDICARE

## 2022-08-17 ENCOUNTER — TELEPHONE (OUTPATIENT)
Dept: TRANSPLANT | Facility: CLINIC | Age: 47
End: 2022-08-17
Payer: MEDICARE

## 2022-08-17 ENCOUNTER — COMMITTEE REVIEW (OUTPATIENT)
Dept: TRANSPLANT | Facility: CLINIC | Age: 47
End: 2022-08-17
Payer: MEDICARE

## 2022-08-17 LAB
PETH 16:0/18.1 (POPETH): <10 NG/ML
PETH 16:0/18.2 (PLPETH): <10 NG/ML

## 2022-08-17 NOTE — COMMITTEE REVIEW
Irvin Diaz's case presented to selection committee.  Patient has been declined for liver transplant due to high risk of alcohol recidivism. Patient started drinking again despite being  told by his physician in the past that he has liver disease due to alcohol. Patient has history of previously attended [facility] and had some months of sobriety but relapsed. Patient's suitability for transplant remains high risk due to patient's last alcohol use being July 2022 (positive peth 135 on 7/18/2022) and history of relapse. For these reasons patient is declined for liver transplant.     I was present at the committee meeting and attest to the decision of the committee.    Jacek Arreaga  08/17/2022

## 2022-08-18 ENCOUNTER — OFFICE VISIT (OUTPATIENT)
Dept: NEPHROLOGY | Facility: CLINIC | Age: 47
End: 2022-08-18
Payer: MEDICARE

## 2022-08-18 ENCOUNTER — LAB VISIT (OUTPATIENT)
Dept: LAB | Facility: HOSPITAL | Age: 47
End: 2022-08-18
Payer: MEDICARE

## 2022-08-18 VITALS
BODY MASS INDEX: 25.5 KG/M2 | WEIGHT: 188.25 LBS | DIASTOLIC BLOOD PRESSURE: 80 MMHG | OXYGEN SATURATION: 98 % | SYSTOLIC BLOOD PRESSURE: 140 MMHG | HEIGHT: 72 IN | HEART RATE: 79 BPM

## 2022-08-18 DIAGNOSIS — N18.9 ANEMIA IN CHRONIC KIDNEY DISEASE, UNSPECIFIED CKD STAGE: ICD-10-CM

## 2022-08-18 DIAGNOSIS — D63.1 ANEMIA IN CHRONIC KIDNEY DISEASE, UNSPECIFIED CKD STAGE: ICD-10-CM

## 2022-08-18 DIAGNOSIS — N18.30 STAGE 3 CHRONIC KIDNEY DISEASE, UNSPECIFIED WHETHER STAGE 3A OR 3B CKD: Primary | ICD-10-CM

## 2022-08-18 DIAGNOSIS — N18.30 STAGE 3 CHRONIC KIDNEY DISEASE, UNSPECIFIED WHETHER STAGE 3A OR 3B CKD: ICD-10-CM

## 2022-08-18 DIAGNOSIS — E87.29 ALCOHOLIC KETOACIDOSIS: ICD-10-CM

## 2022-08-18 LAB
FERRITIN SERPL-MCNC: 179 NG/ML (ref 20–300)
IRON SERPL-MCNC: 64 UG/DL (ref 45–160)
PTH-INTACT SERPL-MCNC: 67 PG/ML (ref 9–77)
SATURATED IRON: 20 % (ref 20–50)
TOTAL IRON BINDING CAPACITY: 320 UG/DL (ref 250–450)
TRANSFERRIN SERPL-MCNC: 216 MG/DL (ref 200–375)

## 2022-08-18 PROCEDURE — 84466 ASSAY OF TRANSFERRIN: CPT | Performed by: INTERNAL MEDICINE

## 2022-08-18 PROCEDURE — 84165 PATHOLOGIST INTERPRETATION SPE: ICD-10-PCS | Mod: 26,,, | Performed by: PATHOLOGY

## 2022-08-18 PROCEDURE — 99215 OFFICE O/P EST HI 40 MIN: CPT | Mod: PBBFAC | Performed by: INTERNAL MEDICINE

## 2022-08-18 PROCEDURE — 99999 PR PBB SHADOW E&M-EST. PATIENT-LVL V: ICD-10-PCS | Mod: PBBFAC,GC,, | Performed by: INTERNAL MEDICINE

## 2022-08-18 PROCEDURE — 82652 VIT D 1 25-DIHYDROXY: CPT | Performed by: INTERNAL MEDICINE

## 2022-08-18 PROCEDURE — 99214 PR OFFICE/OUTPT VISIT, EST, LEVL IV, 30-39 MIN: ICD-10-PCS | Mod: S$PBB,GC,, | Performed by: INTERNAL MEDICINE

## 2022-08-18 PROCEDURE — 99214 OFFICE O/P EST MOD 30 MIN: CPT | Mod: S$PBB,GC,, | Performed by: INTERNAL MEDICINE

## 2022-08-18 PROCEDURE — 86334 PATHOLOGIST INTERPRETATION IFE: ICD-10-PCS | Mod: 26,,, | Performed by: PATHOLOGY

## 2022-08-18 PROCEDURE — 86334 IMMUNOFIX E-PHORESIS SERUM: CPT | Mod: 26,,, | Performed by: PATHOLOGY

## 2022-08-18 PROCEDURE — 84165 PROTEIN E-PHORESIS SERUM: CPT | Mod: 26,,, | Performed by: PATHOLOGY

## 2022-08-18 PROCEDURE — 99999 PR PBB SHADOW E&M-EST. PATIENT-LVL V: CPT | Mod: PBBFAC,GC,, | Performed by: INTERNAL MEDICINE

## 2022-08-18 PROCEDURE — 83970 ASSAY OF PARATHORMONE: CPT | Performed by: INTERNAL MEDICINE

## 2022-08-18 PROCEDURE — 84165 PROTEIN E-PHORESIS SERUM: CPT | Performed by: INTERNAL MEDICINE

## 2022-08-18 PROCEDURE — 82728 ASSAY OF FERRITIN: CPT | Performed by: INTERNAL MEDICINE

## 2022-08-18 PROCEDURE — 86334 IMMUNOFIX E-PHORESIS SERUM: CPT | Performed by: INTERNAL MEDICINE

## 2022-08-18 RX ORDER — CARVEDILOL 12.5 MG/1
12.5 TABLET ORAL 2 TIMES DAILY WITH MEALS
Qty: 60 TABLET | Refills: 11 | Status: ON HOLD | OUTPATIENT
Start: 2022-08-18 | End: 2024-04-01 | Stop reason: HOSPADM

## 2022-08-18 NOTE — PROGRESS NOTES
Nephrology Clinic Note   8/18/2022    CC: here to establish care for CKD3      History of present illness:  Patient is a 46 y.o. male. With PMH of alcoholic liver cirrhosis. Baseline cr is 1.7-2 known to have CKD3a. Patient did have an ENA back in march with cr up to 3 at that time was attributed to ATN. Patient was denied liver transplant because of alcohol. Denies SOB, no chest pain , no urinary symptoms, no NSAID use. Says that his BP runs at 150s at home. Denies any other symptoms     No problems updated.  Review of Systems   All other systems reviewed and are negative.      History:  Past Medical History:   Diagnosis Date    Alcoholic cirrhosis of liver     Arthritis     ATN (acute tubular necrosis)     CHF (congestive heart failure)     Diverticulitis 01/2020    Esophageal varices     GERD (gastroesophageal reflux disease)     GI bleed     Hip arthritis     Left    Hypertension     Liver cirrhosis     Macrocytic anemia     Pulmonary embolism 08/2018    Unprovoked DVT.  Stop Coumadin due to GIB    Thrombocytopenia       Past Surgical History:   Procedure Laterality Date    ANKLE SURGERY      BLOCK, NERVE, PERIPHERAL Left 06/24/2022    Procedure: Left Hip femoral-obturator accessory nerve block;  Surgeon: Robbin De La Garza DO;  Location: [facility] OR;  Service: Pain Management;  Laterality: Left;    COLON SURGERY  2007    [facility]  [facility]/2019    [facility]    COLONOSCOPY N/A 02/17/2021    Procedure: COLONOSCOPY;  Surgeon: Renea Billingsley MD;  Location: [facility];  Service: Endoscopy;  Laterality: N/A;    COLONOSCOPY W/ BIOPSIES  02/17/2021    ESOPHAGOGASTRODUODENOSCOPY N/A 07/26/2019    Procedure: EGD (ESOPHAGOGASTRODUODENOSCOPY);  Surgeon: Brian Trivedi MD;  Location[facility];  Service: Endoscopy;  Laterality: N/A;    ESOPHAGOGASTRODUODENOSCOPY N/A 04/08/2020    Procedure: EGD (ESOPHAGOGASTRODUODENOSCOPY);  Surgeon: Donn Acuna MD;  Location: [facility];  Service: Endoscopy;   Laterality: N/A;    ESOPHAGOGASTRODUODENOSCOPY N/A 01/03/2021    Procedure: EGD (ESOPHAGOGASTRODUODENOSCOPY)- coffee ground emesis, hx varices;  Surgeon: Steve Chairez MD;  Location: [facility];  Service: Endoscopy;  Laterality: N/A;    ESOPHAGOGASTRODUODENOSCOPY N/A 05/03/2021    Procedure: EGD (ESOPHAGOGASTRODUODENOSCOPY);  Surgeon: Jeanmarie Ngo MD;  Location: [facility];  Service: Endoscopy;  Laterality: N/A;    ESOPHAGOGASTRODUODENOSCOPY N/A 07/19/2021    Procedure: ESOPHAGOGASTRODUODENOSCOPY (EGD);  Surgeon: Claudio Medeiros MD;  Location: [facility];  Service: Endoscopy;  Laterality: N/A;    ESOPHAGOGASTRODUODENOSCOPY  12/20/2021    ESOPHAGOGASTRODUODENOSCOPY N/A 12/20/2021    Procedure: EGD (ESOPHAGOGASTRODUODENOSCOPY);  Surgeon: Ajay Jackson MD;  Location: [facility];  Service: Endoscopy;  Laterality: N/A;    ESOPHAGOGASTRODUODENOSCOPY N/A 05/03/2022    Procedure: EGD (ESOPHAGOGASTRODUODENOSCOPY);  Surgeon: Brian Trivedi MD;  Location[facility];  Service: Endoscopy;  Laterality: N/A;    ESOPHAGOGASTRODUODENOSCOPY N/A 7/7/2022    Procedure: EGD (ESOPHAGOGASTRODUODENOSCOPY);  Surgeon: Ajay Jackson MD;  Location: [facility];  Service: Endoscopy;  Laterality: N/A;    HERNIA REPAIR Left     Inguinal    UPPER GASTROINTESTINAL ENDOSCOPY N/A 07/07/2022        Current Outpatient Medications:     carvediloL (COREG) 6.25 MG tablet, Take 1 tablet (6.25 mg total) by mouth Daily., Disp: 30 tablet, Rfl: 5    cholestyramine (QUESTRAN) 4 gram packet, Take 1 packet (4 g total) by mouth 2 (two) times daily., Disp: 180 packet, Rfl: 3    ergocalciferol (ERGOCALCIFEROL) 50,000 unit Cap, 1 tablet, Disp: , Rfl:     famotidine (PEPCID) 20 MG tablet, 1 tablet at bedtime as needed, Disp: , Rfl:     ferrous gluconate (FERGON) 240 (27 FE) MG tablet, Take 2 tablets (480 mg total) by mouth 2 (two) times daily with meals., Disp: 120 tablet, Rfl: 3    folic acid (FOLVITE) 1 MG tablet, Take 1 tablet (1  mg total) by mouth once daily., Disp: 30 tablet, Rfl: 5    gabapentin (NEURONTIN) 300 MG capsule, Take 1 capsule (300 mg total) by mouth every evening for 3 days, THEN 1 capsule (300 mg total) 2 (two) times daily for 3 days, THEN 1 capsule (300 mg total) 3 (three) times daily for 24 days. (Patient taking differently: Take 1 capsule by mouth three times daily), Disp: 81 capsule, Rfl: 0    lactulose (CHRONULAC) 10 gram/15 mL solution, Take 45 mL (30 g total) by mouth 3 (three) times daily., Disp: 4050 mL, Rfl: 0    LORazepam (ATIVAN) 0.5 MG tablet, Take 1 tablet (0.5 mg total) by mouth every 6 (six) hours as needed for Anxiety., Disp: 5 tablet, Rfl: 0    ondansetron (ZOFRAN-ODT) 4 MG TbDL, 1 tablet on the tongue and allow to dissolve, Disp: , Rfl:     pantoprazole (PROTONIX) 40 MG tablet, Take 1 tablet (40 mg total) by mouth once daily. (Patient taking differently: Take 40 mg by mouth 2 (two) times daily.), Disp: 30 tablet, Rfl: 11    potassium chloride (K-TAB) 20 mEq, 1 tablet with food, Disp: , Rfl:     rifAXIMin (XIFAXAN) 550 mg Tab, Take 1 tablet (550 mg total) by mouth 2 (two) times daily., Disp: 60 tablet, Rfl: 6    sildenafiL (VIAGRA) 100 MG tablet, Take 1 tablet (100 mg total) by mouth daily as needed for Erectile Dysfunction., Disp: 30 tablet, Rfl: 5    sucralfate (CARAFATE) 1 gram tablet, Take 1 g by mouth 4 (four) times daily., Disp: , Rfl:     thiamine 100 MG tablet, Take 1 tablet (100 mg total) by mouth once daily., Disp: 30 tablet, Rfl: 5    traMADoL (ULTRAM) 50 mg tablet, Take 1 tablet (50 mg total) by mouth 2 (two) times daily as needed for Pain., Disp: 60 tablet, Rfl: 0    [START ON 9/1/2022] traMADoL (ULTRAM) 50 mg tablet, Take 1 tablet (50 mg total) by mouth 2 (two) times daily as needed for Pain., Disp: 60 tablet, Rfl: 0    triamcinolone acetonide 0.1% (KENALOG) 0.1 % ointment, Apply topically 2 (two) times daily. So not apply to face or anogenital region (Patient taking differently:  Apply topically 2 (two) times daily as needed (itching). do not apply to face or anogenital region), Disp: 30 g, Rfl: 1  Review of patient's allergies indicates:   Allergen Reactions    Ciprofloxacin hcl Hallucinations    Meperidine Hives     Pt medicated w/multiple medications (demerol, protonix, and zofran)  w/i 10 mins time frame prior to developing localized hives near IV site that med was administered. Pt reports he has/takes all other medications on daily basis.     Morphine Itching    Nsaids (non-steroidal anti-inflammatory drug)      Kidney Disease    Oxycodone      Weird, lucid dreams    Tylenol [acetaminophen]      Hx of liver disease      Social History     Tobacco Use    Smoking status: Former Smoker     Types: Cigarettes     Quit date:      Years since quittin.6    Smokeless tobacco: Never Used    Tobacco comment: quit 20 years ago   Substance Use Topics    Alcohol use: Not Currently     Comment: mary      Family History   Problem Relation Age of Onset    Hypertension Mother     Breast cancer Mother     Hypertension Father     Prostate cancer Father     Bladder Cancer Father         Physical Exam :  There were no vitals filed for this visit.  Physical Exam  Vitals reviewed.   Constitutional:       Appearance: Normal appearance.   HENT:      Head: Normocephalic and atraumatic.      Mouth/Throat:      Mouth: Mucous membranes are moist.   Eyes:      Conjunctiva/sclera: Conjunctivae normal.      Pupils: Pupils are equal, round, and reactive to light.   Cardiovascular:      Rate and Rhythm: Normal rate and regular rhythm.      Pulses: Normal pulses.      Heart sounds: Normal heart sounds.   Pulmonary:      Effort: Pulmonary effort is normal.      Breath sounds: Normal breath sounds.   Abdominal:      General: Bowel sounds are normal.      Palpations: Abdomen is soft.   Musculoskeletal:         General: Normal range of motion.   Skin:     General: Skin is warm.      Capillary Refill:  Capillary refill takes less than 2 seconds.   Neurological:      General: No focal deficit present.      Mental Status: He is alert and oriented to person, place, and time.   Psychiatric:         Mood and Affect: Mood normal.         Labs reviewed       Assessment:    1) CKD3 secondary to cirrhosis: scr 1.8 at baseline ( baseline 1.7-2)  Patient did have ENA back in march/2022 with cr up to 3s and was attributed to ATN and now cr back at baseline  Bicarb 22 no need for sodium bicarb at this time   Renal US no hydro   UPCR 0.04   Will check SPEP, IF, had 5 RBC on UA will spin urine       2) CKD-MBD:   Check phos   Vit D is low, will check calcitriol and PTH    3)Anemia in CKD3:   Check iron studies     4) HTN: not well controlled on coreg 6.25 qday. Will increase coreg to 12.5BID     5) liver cirrhosis : follow with hepatology. Patient was denied liver transplant       RTC in 4 months

## 2022-08-19 ENCOUNTER — TELEPHONE (OUTPATIENT)
Dept: TRANSPLANT | Facility: CLINIC | Age: 47
End: 2022-08-19
Payer: MEDICARE

## 2022-08-19 DIAGNOSIS — Z76.82 ORGAN TRANSPLANT CANDIDATE: Primary | ICD-10-CM

## 2022-08-19 LAB
ALBUMIN SERPL ELPH-MCNC: 3.11 G/DL (ref 3.35–5.55)
ALPHA1 GLOB SERPL ELPH-MCNC: 0.24 G/DL (ref 0.17–0.41)
ALPHA2 GLOB SERPL ELPH-MCNC: 0.49 G/DL (ref 0.43–0.99)
B-GLOBULIN SERPL ELPH-MCNC: 1 G/DL (ref 0.5–1.1)
GAMMA GLOB SERPL ELPH-MCNC: 3.36 G/DL (ref 0.67–1.58)
INTERPRETATION SERPL IFE-IMP: NORMAL
PATHOLOGIST INTERPRETATION IFE: NORMAL
PATHOLOGIST INTERPRETATION SPE: NORMAL
PROT SERPL-MCNC: 8.2 G/DL (ref 6–8.4)

## 2022-08-19 NOTE — TELEPHONE ENCOUNTER
Spoke to patient regarding transplant committee's decision. Informed patient he has been declined at this time due to being high risk due to his last alcohol use being July 2022 (positive peth 135 on 7/18/2022) and history of relapse. Discussed his follow up with Dr. Arreaga in hepatology. Understanding stated.

## 2022-08-19 NOTE — LETTER
August 19, 2022    Irvin Diaz  [address] [place] [address]  [id number]    Dear Mr. Diaz:    MRN:  [id number]    It is the duty of the [facility] Liver Transplant Selection Committee to determine which patients are candidates for a liver transplant.  Unfortunately, there are many more patients who need liver transplants than there are organs available.  For this reason, our Committee has the difficult task of evaluating patients to determine which ones have the greatest chance of having a successful transplant.  We are aware of the magnitude of this responsibility, and we approach it with reverence and humility.    The Liver Selection Committee has completed your evaluation. We conducted a thorough review of your medical records, your psychosocial history and alcohol screens.  Based on this review, we have determined that you are high risk due to your last alcohol use being July 2022 (positive peth 135 on 7/18/2022) and history of relapse.  At this time, you are not a candidate for a liver transplant at [facility], and you will not be listed for liver transplant.  We are very sorry that this decision had to be made, and we want to assure you that, along with your primary care physician, [facility] doctors will continue to provide you with medical care.  We recommend that you follow up with your primary care physician.  If at any time, you want to have your care reviewed by other physicians and/or institutions, we will be happy to make your complete record available.  To obtain a copy of your medical record, please contact the Release of Information Office at [phone].    Attached is a letter from the [employer] for Organ Sharing (UNOS).  It describes the services and information offered to patients by UNOS and the Organ Procurement and Transplant Network.    We wish you all the best in the future.    Sincerely,    Mitch Lewis MD, MSc, [facility], FACS  Medical Director, Multi-Organ Transplant [place]  Head,  Abdominal Transplant Surgery  Surgical Director, Kidney Transplant Program    AJLILIANE:JOSE/brook    Enclosure    c:  Brian Trivedi MD    [facility] [place]  [facility] [facility] * [place]  [address] * [phone]             The Organ Procurement and Transplantation Network   Toll-free patient services line:  Your resource for organ transplant information      If you have a question regarding your own medical care, you always should call your transplant hospital first. However, for general organ transplant-related information, you can call the Organ Procurement and Transplantation Network (OPTN) toll-free patient services line at [phone].     Anyone, including potential transplant candidates, candidates, recipients, family members, friends, living donors, and donor family members, can call this number to:     · Talk about organ donation, living donation, the transplant process, the donation process, and transplant policies.   · Get a free patient information kit with helpful booklets, waiting list and transplant information, and a list of all transplant hospitals.   · Ask questions about the OPTN website (https://optn.transplant.hrsa.gov/), the [employer] for Organ Sharings (UNOS) website (https://unos.org/), or the UNOS website for living donors and transplant recipients. (https://www.transplantliving.org/).   · Learn how the OPTN can help you.   · Talk about any concerns that you may have with a transplant hospital.     The nations transplant system, the OPTN, is managed under federal contract by the [employer] Organ Sharing (UNOS), which is a non-profit charitable organization. The OPTN helps create and define organ sharing policies that make the best use of donated organs. This process continuously evaluating new advances and discoveries so policies can be adapted to best serve patients waiting for transplants. To do so, the OPTN works closely with transplant  professionals, transplant patients, transplant candidates, donor families, living donors, and the public. All transplant programs and organ procurement organizations throughout the country are OPTN members and are obligated to follow the policies the OPTN creates for allocating organs.     The OPTN also is responsible for:   · Providing educational material for patients, the public, and professionals.   · Raising awareness of the need for donated organs and tissue.   · Coordinating organ procurement, matching, and placement.   · Collecting information about every organ transplant and donation that occurs in the [place].     Remember, you should contact your transplant hospital directly if you have questions or concerns about your own medical care including medical records, work-up progress, and test results.     We are not your transplant hospital, and our staff will not be able to answer questions about your case, so please keep your transplant hospitals phone number handy.   However, while you research your transplant needs and learn as much as you can about transplantation and donation, we welcome your call to our toll-free patient services line at [phone].

## 2022-08-22 LAB — 1,25(OH)2D3 SERPL-MCNC: 12 PG/ML (ref 20–79)

## 2022-08-29 ENCOUNTER — TELEPHONE (OUTPATIENT)
Dept: PAIN MEDICINE | Facility: CLINIC | Age: 47
End: 2022-08-29
Payer: MEDICARE

## 2022-08-29 NOTE — TELEPHONE ENCOUNTER
Staff spoke with patient regarding the pain he is having in his leg, patient would like to see the provider soon to discuss another injection. Patient was scheduled on the 7th of Sept. At 10:00am. Patient verbalized understanding.

## 2022-08-29 NOTE — TELEPHONE ENCOUNTER
----- Message from Francesca Orozco sent at 8/29/2022  8:51 AM CDT -----  Regarding: Requesting a call back  Contact: MARIA ISABEL LUX JR. [[id number]]  Name of Who is Calling:MARIA ISABEL LUX JR. [[id number]]          What is the request in detail:  Pt states he legs is in pain and could barley walk, he is requesting a injection. Please advise he is requesting a call back in regards         Can the clinic reply by [facility]:          What Number to Call Back if not in [facility]:[phone]

## 2022-09-07 ENCOUNTER — OFFICE VISIT (OUTPATIENT)
Dept: PRIMARY CARE CLINIC | Facility: CLINIC | Age: 47
End: 2022-09-07
Payer: MEDICARE

## 2022-09-07 ENCOUNTER — OFFICE VISIT (OUTPATIENT)
Dept: SPINE | Facility: CLINIC | Age: 47
End: 2022-09-07
Payer: MEDICARE

## 2022-09-07 ENCOUNTER — PATIENT MESSAGE (OUTPATIENT)
Dept: PAIN MEDICINE | Facility: CLINIC | Age: 47
End: 2022-09-07
Payer: MEDICARE

## 2022-09-07 VITALS
RESPIRATION RATE: 18 BRPM | WEIGHT: 188.06 LBS | HEART RATE: 87 BPM | SYSTOLIC BLOOD PRESSURE: 138 MMHG | HEIGHT: 72 IN | DIASTOLIC BLOOD PRESSURE: 78 MMHG | TEMPERATURE: 98 F | OXYGEN SATURATION: 97 % | BODY MASS INDEX: 25.47 KG/M2

## 2022-09-07 VITALS — BODY MASS INDEX: 25.47 KG/M2 | WEIGHT: 188 LBS | RESPIRATION RATE: 18 BRPM | HEIGHT: 72 IN

## 2022-09-07 DIAGNOSIS — R20.8 ALLODYNIA: ICD-10-CM

## 2022-09-07 DIAGNOSIS — M87.00 AVN (AVASCULAR NECROSIS OF BONE): ICD-10-CM

## 2022-09-07 DIAGNOSIS — F10.930 ALCOHOL WITHDRAWAL SYNDROME WITHOUT COMPLICATION: ICD-10-CM

## 2022-09-07 DIAGNOSIS — K70.30 ALCOHOLIC CIRRHOSIS, UNSPECIFIED WHETHER ASCITES PRESENT: ICD-10-CM

## 2022-09-07 DIAGNOSIS — L70.0 ACNE VULGARIS: Primary | ICD-10-CM

## 2022-09-07 DIAGNOSIS — G89.29 CHRONIC LEFT HIP PAIN: ICD-10-CM

## 2022-09-07 DIAGNOSIS — K76.82 HEPATIC ENCEPHALOPATHY: ICD-10-CM

## 2022-09-07 DIAGNOSIS — N18.30 STAGE 3 CHRONIC KIDNEY DISEASE, UNSPECIFIED WHETHER STAGE 3A OR 3B CKD: ICD-10-CM

## 2022-09-07 DIAGNOSIS — M87.052 AVASCULAR NECROSIS OF BONE OF HIP, LEFT: Primary | ICD-10-CM

## 2022-09-07 DIAGNOSIS — D63.8 ANEMIA OF CHRONIC DISEASE: ICD-10-CM

## 2022-09-07 DIAGNOSIS — I10 ESSENTIAL HYPERTENSION: Chronic | ICD-10-CM

## 2022-09-07 DIAGNOSIS — Z87.19 HISTORY OF GI BLEED: Chronic | ICD-10-CM

## 2022-09-07 DIAGNOSIS — K70.31 ASCITES DUE TO ALCOHOLIC CIRRHOSIS: ICD-10-CM

## 2022-09-07 DIAGNOSIS — M25.552 CHRONIC LEFT HIP PAIN: ICD-10-CM

## 2022-09-07 PROCEDURE — 99214 PR OFFICE/OUTPT VISIT, EST, LEVL IV, 30-39 MIN: ICD-10-PCS | Mod: S$PBB,,, | Performed by: FAMILY MEDICINE

## 2022-09-07 PROCEDURE — 99215 PR OFFICE/OUTPT VISIT, EST, LEVL V, 40-54 MIN: ICD-10-PCS | Mod: S$PBB,,, | Performed by: STUDENT IN AN ORGANIZED HEALTH CARE EDUCATION/TRAINING PROGRAM

## 2022-09-07 PROCEDURE — 99215 OFFICE O/P EST HI 40 MIN: CPT | Mod: PBBFAC,27,PN | Performed by: FAMILY MEDICINE

## 2022-09-07 PROCEDURE — 99214 OFFICE O/P EST MOD 30 MIN: CPT | Mod: S$PBB,,, | Performed by: FAMILY MEDICINE

## 2022-09-07 PROCEDURE — 99214 OFFICE O/P EST MOD 30 MIN: CPT | Mod: PBBFAC | Performed by: STUDENT IN AN ORGANIZED HEALTH CARE EDUCATION/TRAINING PROGRAM

## 2022-09-07 PROCEDURE — 99999 PR PBB SHADOW E&M-EST. PATIENT-LVL V: CPT | Mod: PBBFAC,,, | Performed by: FAMILY MEDICINE

## 2022-09-07 PROCEDURE — 99215 OFFICE O/P EST HI 40 MIN: CPT | Mod: S$PBB,,, | Performed by: STUDENT IN AN ORGANIZED HEALTH CARE EDUCATION/TRAINING PROGRAM

## 2022-09-07 PROCEDURE — 99999 PR PBB SHADOW E&M-EST. PATIENT-LVL IV: ICD-10-PCS | Mod: PBBFAC,,, | Performed by: STUDENT IN AN ORGANIZED HEALTH CARE EDUCATION/TRAINING PROGRAM

## 2022-09-07 PROCEDURE — 99999 PR PBB SHADOW E&M-EST. PATIENT-LVL V: ICD-10-PCS | Mod: PBBFAC,,, | Performed by: FAMILY MEDICINE

## 2022-09-07 PROCEDURE — 99999 PR PBB SHADOW E&M-EST. PATIENT-LVL IV: CPT | Mod: PBBFAC,,, | Performed by: STUDENT IN AN ORGANIZED HEALTH CARE EDUCATION/TRAINING PROGRAM

## 2022-09-07 RX ORDER — OXYCODONE HYDROCHLORIDE 5 MG/1
5 CAPSULE ORAL 2 TIMES DAILY PRN
Qty: 60 CAPSULE | Refills: 0 | Status: SHIPPED | OUTPATIENT
Start: 2022-09-07 | End: 2022-09-09

## 2022-09-07 RX ORDER — METHYLPREDNISOLONE 4 MG/1
TABLET ORAL
Qty: 21 EACH | Refills: 0 | Status: SHIPPED | OUTPATIENT
Start: 2022-09-07 | End: 2022-09-28

## 2022-09-07 RX ORDER — OXYCODONE HYDROCHLORIDE 5 MG/1
5 CAPSULE ORAL 2 TIMES DAILY PRN
Qty: 60 CAPSULE | Refills: 0 | Status: SHIPPED | OUTPATIENT
Start: 2022-10-07 | End: 2022-09-09

## 2022-09-07 RX ORDER — NORTRIPTYLINE HYDROCHLORIDE 25 MG/1
25 CAPSULE ORAL NIGHTLY
Qty: 30 CAPSULE | Refills: 3 | Status: SHIPPED | OUTPATIENT
Start: 2022-09-07 | End: 2023-01-23 | Stop reason: SDUPTHER

## 2022-09-07 NOTE — PROGRESS NOTES
Subjective:       Patient ID: Irvin Diaz Jr. is a 46 y.o. male.    Chief Complaint: Follow-up    HPI: 47 yo M in for 6 week follow-up--bumps on face  Just home from hospital --home since July--  Patient with problems with left hip--just saw DR Petros Ornelas--pain management -- txed with new medication--elapsed time was having an ablation and was a 2 part procedure ended up with a hematoma so 2nd part of the ablation was canceled Once gets liver transplant can do hip surgery --untilm then will try control  Liver Transplant --intensive care for alcohol--teach how cope not go alcohol for problems--patient has good meetings daily 6-8 weeks  Lesions face x 2 weeks       47 yo BM --follow up form hospital -seen emergency room [id number] left hip pain/alcohol withdrawal/altered mental status/CHF/possible liver transplant  2-3 days before went into the hospital at stop drinking--started seeing things--writing on the walls--bugs--felt was from medication he was taking for left hip so stopped it. In [facility] First time had procedure on him --hurt alot . Got blood . Went home --took pain meds then seeing things .         Patient thought that he was going to get a bone scan but parents checked him into the emergency room since patient was hallucinating see above lab        Eating well  + BM --Ambulating OK --bowels OKwithout lactulose .       Patient with osteoporosis at some point needs left hip replacement--patient trying to work on a liver replacement 1st.Pt with transplant te[facility]       ROS:  Skin: no psoriasis, +eczema, skin cancer skin lesion zygomatic areas bilateral appeared to be papulopustular  ? Acneform   HEENT: No headache, ocular pain, blurred vision, diplopia, epistaxis, hoarseness change in voice, thyroid trouble  Lung: No pneumonia, asthma, Tb, wheezing, SOB, NO SMOKING  Heart: No chest pain, ankle edema, palpitations, MI, yo murmur, +hypertension, nO hyperlipidemia --NO STENT BYPASS  ARRHYTHMIA  Abdomen: No nausea, vomiting, diarrhea, constipation, ulcers, hepatitis, gallbladder disease, melena, hematochezia, hematemesis--had upper GI bleed in the past had lower GI bleed in the past has history of cirrhosis of the liver ascites due to alcoholic cirrhosis  : no UTI, renal disease, stones  MS: no fractures, O/A, lupus, rheumatoid, gout history avascular necrosis of the femoral head why patient needs left hip replaced  Neuro: No dizziness, LOC, seizures   No diabetes, no anemia, no anxiety, no depression    5 children work disbaled due left hip lives with parents     Objective:   Physical Exam:  General: Well nourished, well developed, no acute distress Overweight   Skin papular pustular lesions zygomatic arches bilaterally superficial and minimal   HEENT: Eyes PERRLA, EOM intact, nose patent, throat non-erythematous ears TM cler   NECK: Supple, no bruits, No JVD, no nodes  Lungs: Clear, no rales, rhonchi, wheezing  Heart: Regular rate and rhythm, no murmurs, gallops, or rubs  Abdomen: flat, bowel sounds positive, no tenderness, or organomegaly  MS: left hip pain Hx AVN fem head needs hip replacement pt has difficulty standing--only able to take 2 or 3 steps--has difficulty using wheelchair because has to go long distances leaning forward and back hurts left hip--patient remains a left hip replaced  Neuro: Alert, CN intact, oriented X 3  Extremities: No cyanosis, clubbing, or edema         Assessment:       1. Acne vulgaris    2. AVN (avascular necrosis of bone)    3. Chronic left hip pain    4. History of GI bleed    5. Hepatic encephalopathy    6. Ascites due to alcoholic cirrhosis    7. Alcoholic cirrhosis, unspecified whether ascites present    8. Essential hypertension    9. Alcohol withdrawal syndrome without complication          Plan:       Acne vulgaris    AVN (avascular necrosis of bone)    Chronic left hip pain    History of GI bleed    Hepatic encephalopathy    Ascites due to  alcoholic cirrhosis    Alcoholic cirrhosis, unspecified whether ascites present    Essential hypertension    Alcohol withdrawal syndrome without complication        One scooter--barely able to stand to left hip pain with avascular necrosis of the femoral head--needs hip replacement but needs to have liver transplant 1st--only able to take 2 or 3 stab--unable to use wheelchair because has pain when has to lean forward back to move the wheel due to hip pain  Skin lesions on zygomatic area probably acne toe best to just use some over-the-counter acne preparations to cut down 1  Hx alcohol abuse with recent alcohol withdrawal --recent admit hepatic encephalopathy/cirrhosis of the liver/esophageal varices with 8 banding/history GI bleed-transfused--patient going to an intensive program 6-8 weeks to try and stop drinking so able get liver transplant  History avascular necrosis of the femoral head left hip needs left hip replacement after liver transplant  History pulmonary embolus number cytopenia  Stage III chronic renal insufficiency  Hypertension CHF  Lab CBC CMP lipid Thyroid T4 TSH ammonia level in 3 months  Health maintenance COVID flu  Patient given appointment for 6 months because needs to see multiple other specialists orthopedist/liver doctors/and go through alcohol program

## 2022-09-07 NOTE — PROGRESS NOTES
Chronic Pain - f/u    Referring Physician: No ref. provider found    Date: 09/07/2022     Re: Irvin Diaz Jr.  MR#: [id number]  YOB: 1975  Age: 46 y.o.    Chief Complaint:   Chief Complaint   Patient presents with    Hip Pain     L    Leg Pain     L    Foot Pain     L     **This note is dictated using the M*Modal Fluency Direct word recognition program. There are word recognition mistakes that are occasionally missed on review.**    ASSESSMENT: 46 y.o. year old male with left hip pain, consistent with     1. Avascular necrosis of bone of hip, left  methylPREDNISolone (MEDROL DOSEPACK) 4 mg tablet    nortriptyline (PAMELOR) 25 MG capsule    oxyCODONE (OXY-IR) 5 mg Cap    oxyCODONE (OXY-IR) 5 mg Cap      2. Allodynia  nortriptyline (PAMELOR) 25 MG capsule      3. Stage 3 chronic kidney disease, unspecified whether stage 3a or 3b CKD        4. Anemia of chronic disease                PLAN:     Avascular necrosis of the left hip   -s/p hip block without significant pain relief and complicated by Hematoma.   -Tramadol ineffective (discontinued)  -will retry oxycodone 5 mg BID PRN (he thinks his lucid dreams were due to EtOH withdrawal)  - Medrol dose pack, discussed risk of steroid injections though his Plt ct has improved  -not surgical candidate  -Not a candidate for further procedures due to bleeding risk  - Plts improved to ~80, discussed possibility of   -buprenorphine likely not an option 2/2 to his ESLD and liver transplant status    Allodynia  -start Nortriptyline 25 mg nightly    Thorombocytopenia 2/2 cirrhosis  -platelets are 41 on 5/4/22, 92 on 5/26/22, 82 on 9/7/22  -Avanos recommended above 50.    Chronic Opioid Use  - UDS prn    Cirrhosis 2/2 past EtOH  -following with hepatology  -possible liver transplant?    Kidney disease  -GFR 54    - RTC 2 months  - Counseled patient regarding the importance of  activity modification.    The above plan and management options were discussed at length  with patient. Patient is in agreement with the above and verbalized understanding. It will be communicated with the referring physician via electronic record, fax, or mail.  Lab/study reports reviewed were important and necessary because subsequent medical and treatment recommendations required review of the above lab/study reports. Images viewed/reviewed above were important and necessary because subsequent medical and treatment recommendations required review of the reviewed image(s).     Electronically signed by:  Robbin De La Garza DO  09/07/2022    =========================================================================================================    SUBJECTIVE:    Interval History 9/7/2022:     Irvin Diaz Jr. is a 46 y.o. male presents to the clinic for follow up.  Since last visit the pain has has worsened. He is now using a walker full time for the last 2 weeks. In a wheelchair today. He is unable to describe if his limited mobility is due to just pain or maybe a component of weakness.     The pain is located in the L hip area and radiates to the L foot .  The pain is described as aching, shooting, and stabbing    At BEST  7/10   At WORST  10/10 on the WORST day.    On average pain is rated as 8/10.   Today the pain is rated as 10/10  Symptoms interfere with daily activity, sleeping, and work.   Exacerbating factors: Standing, Laying, Bending, Walking, Lifting, and Getting out of bed/chair.    Mitigating factors none.     Current pain medications: Tramadol (BID), he starting taking it up to TID    Failed Pain Medications: oxycodone, tramadol, gabapentin, cannot take NSAIDs due to gastric bleeding, cannot take tylenol due to liver failure.     Pain procedures:  6/24/22 - left hip block - no relief. C/b hematoma formation    Interval History 8/2/2022:     Irvin Diaz Jr. is a 46 y.o. male presents to the clinic for follow up.  Since last visit the pain has is unchanged.  He stopped the  Oxycodone because it gave severe, lucid dreams.  Ran out of tramadol and has not been taking anything for his pain.    The pain is located in the left hip area and radiates to the left knee/ groin .  The pain is described as aching, shooting and stabbing    At BEST  8/10   At WORST  10/10 on the WORST day.    On average pain is rated as 8/10.   Today the pain is rated as 8/10  Symptoms interfere with daily activity, sleeping and work.   Exacerbating factors: Standing, Walking and Getting out of bed/chair.    Mitigating factors nothing, heat, ice, medications and rest.     Interval History 7/5/2022:     Irvin Diaz Jr. is a 46 y.o. male presents to the clinic for follow up.  Since last visit the pain has is unchanged.  He is s/p hip block that was complicated by hematoma in the pectineus muscle.  His Hgb has dropped, although the hematoma is resolving.  Recommended that patient go to ED as recommended by transplant.    The pain is located in the left hip area and radiates to the left leg/ groin  .  The pain is described as aching, shooting and stabbing    At BEST  8/10   At WORST  10/10 on the WORST day.    On average pain is rated as 8/10.   Today the pain is rated as 8/10  Symptoms interfere with daily activity, sleeping and work.   Exacerbating factors: Standing, Walking and Getting out of bed/chair.    Mitigating factors nothing, heat, ice, medications and rest.     Interval History 5/30/2022:     Irvin Diaz Jr. is a 46 y.o. male presents to the clinic for follow up.  Since last visit the pain has is unchanged. He missed his original procedure date due to not checking his voicemail and transportation issues. He is still interested in the procedure.  WE had a long talk about bleeding risk with his low platelets and high INR.    The pain is located in the left hip area and radiates to the left leg/groin .  The pain is described as aching, shooting and stabbing    At BEST  8/10   At WORST  10/10 on  the WORST day.    On average pain is rated as 8/10.   Today the pain is rated as 8/10  Symptoms interfere with daily activity, sleeping and work.   Exacerbating factors: walking.    Mitigating factors nothing.     Initial Hx:  Irvin Diaz Jr. is a 46 y.o. male presents to the clinic for the evaluation of left hip pain. The pain started 3 years ago following fall and symptoms have been worsening. The patient states used to get hip injections of the left hip.  He is unable to walk without a cane.  He states that he was told after imaging that he had a fracture in 2019.  He had an injection (and aspiration) in December/january and it helps the pain for about 3 weeks.  After the injections he still needs the pain but it helps.  The ortho physician at [facility]    CT note about the hip:  There is collapse of the left femoral head superior portion with bone-on-bone as well as underlying femoral sclerotic changes suggesting AVN with severe secondary degenerative changes of the acetabulum and the left hip effusion stable since prior exam.     The patient says that he has seen a ortho surgeon in the past and that they are unable to do surgery due to his liver failure.    Pain Description:    The pain is located in the left hip area and radiates to the left leg/ groin area .    At BEST  8/10   At WORST  10/10 on the WORST day.    On average pain is rated as 8/10.   Today the pain is rated as 8/10  The pain is continuous.  The pain is described as aching, shooting and throbbing    Symptoms interfere with daily activity, sleeping and work.   Exacerbating factors: Standing, Laying, Bending, Walking, Night Time, Morning, Flexing, Lifting and Getting out of bed/chair.    Mitigating factors laying down and medications.   He reports 5 hours of sleep per night.    Physical Therapy/Home Exercise: No, not currently in physical therapy or home exercise program    Current Pain Medications:    - none    Failed Pain  Medications:    - cannot take NSAIDs due to gastric bleeding, cannot take tylenol due to liver failure.     Pain Treatment Therapies:    Pain procedures: hip injections  Physical Therapy: physical therapy made things worse  Chiropractor: none  Acupuncture: none  TENS unit: none  Spinal decompression: none  Joint replacement: none    Patient denies urinary incontinence, bowel incontinence and loss of sensations. (+) weakness in the left leg  Patient denies any suicidal or homicidal ideations     report:  Reviewed and consistent with medication use as prescribed.    Imaging:   XR abdomen 03/2022:  Please note the hemidiaphragms are not included in their entirety.  Multiple surgical clips and presumed anastomotic suture line project over the right abdomen.  There are a few mildly prominent loops of gas-filled small bowel loops present measuring up to 3.3 cm in the left abdomen.  Limited evaluation of free intraperitoneal air to patient positioning/technique.  Calcifications project over the pelvis, likely phleboliths.  Osseous structures demonstrate significant degenerative change of the left hip with chronic appearing deformity/collapse of the left femoral head.    CT abdomen 03/2022:  Mild circumferential wall thickening involving the proximal colon extending from the ileocolic anastomosis at the hepatic flexure through around the level of the splenic flexure suggests inflammatory or infectious colitis.  No convincing transmural inflammation is seen.     Postoperative changes of proximal colectomy and scattered mild diverticulosis of the more distal colon.  No convincing diverticulitis, bowel obstruction, free air or abscess.     Findings of cirrhosis and mild abdominal ascites as described essentially stable.     Left hip findings suggesting AVN as described.     This report was flagged in Epic as abnormal.     No other significant or acute findings.       Past Medical History:   Diagnosis Date    Alcoholic  cirrhosis of liver     Arthritis     ATN (acute tubular necrosis)     CHF (congestive heart failure)     Diverticulitis 01/2020    Esophageal varices     GERD (gastroesophageal reflux disease)     GI bleed     Hip arthritis     Left    Hypertension     Liver cirrhosis     Macrocytic anemia     Pulmonary embolism 08/2018    Unprovoked DVT.  Stop Coumadin due to GIB    Thrombocytopenia      Past Surgical History:   Procedure Laterality Date    ANKLE SURGERY      BLOCK, NERVE, PERIPHERAL Left 06/24/2022    Procedure: Left Hip femoral-obturator accessory nerve block;  Surgeon: Robbin De La Garza DO;  Location: [facility];  Service: Pain Management;  Laterality: Left;    COLON SURGERY  2007    COLONOSCOPY  09/05/2019    [facility]    COLONOSCOPY N/A 02/17/2021    Procedure: COLONOSCOPY;  Surgeon: Renea Billingsley MD;  Location: [facility];  Service: Endoscopy;  Laterality: N/A;    COLONOSCOPY W/ BIOPSIES  02/17/2021    ESOPHAGOGASTRODUODENOSCOPY N/A 07/26/2019    Procedure: EGD (ESOPHAGOGASTRODUODENOSCOPY);  Surgeon: Brian Trivedi MD;  Location: [facility];  Service: Endoscopy;  Laterality: N/A;    ESOPHAGOGASTRODUODENOSCOPY N/A 04/08/2020    Procedure: EGD (ESOPHAGOGASTRODUODENOSCOPY);  Surgeon: Donn Acuna MD;  Location: [facility];  Service: Endoscopy;  Laterality: N/A;    ESOPHAGOGASTRODUODENOSCOPY N/A 01/03/2021    Procedure: EGD (ESOPHAGOGASTRODUODENOSCOPY)- coffee ground emesis, hx varices;  Surgeon: Steve Chairez MD;  Location: [facility];  Service: Endoscopy;  Laterality: N/A;    ESOPHAGOGASTRODUODENOSCOPY N/A 05/03/2021    Procedure: EGD (ESOPHAGOGASTRODUODENOSCOPY);  Surgeon: Jeanmarie Ngo MD;  Location: [facility];  Service: Endoscopy;  Laterality: N/A;    ESOPHAGOGASTRODUODENOSCOPY N/A 07/19/2021    Procedure: ESOPHAGOGASTRODUODENOSCOPY (EGD);  Surgeon: Claudio Medeiros MD;  Location: [facility];  Service: Endoscopy;  Laterality: N/A;    ESOPHAGOGASTRODUODENOSCOPY  12/20/2021     ESOPHAGOGASTRODUODENOSCOPY N/A 2021    Procedure: EGD (ESOPHAGOGASTRODUODENOSCOPY);  Surgeon: Ajay Jackson MD;  Location: [facility];  Service: Endoscopy;  Laterality: N/A;    ESOPHAGOGASTRODUODENOSCOPY N/A 2022    Procedure: EGD (ESOPHAGOGASTRODUODENOSCOPY);  Surgeon: Brian Trivedi MD;  Location: [facility];  Service: Endoscopy;  Laterality: N/A;    ESOPHAGOGASTRODUODENOSCOPY N/A 2022    Procedure: EGD (ESOPHAGOGASTRODUODENOSCOPY);  Surgeon: Ajay Jackson MD;  Location: [facility];  Service: Endoscopy;  Laterality: N/A;    HERNIA REPAIR Left     Inguinal    UPPER GASTROINTESTINAL ENDOSCOPY N/A 2022     Social History     Socioeconomic History    Marital status: Legally    Tobacco Use    Smoking status: Former     Types: Cigarettes     Quit date: 2000     Years since quittin.6    Smokeless tobacco: Never    Tobacco comments:     quit 20 years ago   Substance and Sexual Activity    Alcohol use: Not Currently     Comment: freq    Drug use: Yes     Types: Marijuana     Comment: occasionally    Sexual activity: Yes     Comment: occ     Social Determinants of Health     Financial Resource Strain: Low Risk     Difficulty of Paying Living Expenses: Not very hard   Food Insecurity: No Food Insecurity    Worried About Running Out of Food in the Last Year: Never true    Ran Out of Food in the Last Year: Never true   Transportation Needs: No Transportation Needs    Lack of Transportation (Medical): No    Lack of Transportation (Non-Medical): No   Physical Activity: Inactive    Days of Exercise per Week: 0 days    Minutes of Exercise per Session: 0 min   Stress: No Stress Concern Present    Feeling of Stress : Not at all   Social Connections: Socially Isolated    Frequency of Communication with Friends and Family: Three times a week    Frequency of Social Gatherings with Friends and Family: Three times a week    Attends Rastafari Services: Never    Active Member of Clubs or  Organizations: No    Attends Club or Organization Meetings: Never    Marital Status:    Housing Stability: Low Risk     Unable to Pay for Housing in the Last Year: No    Number of Places Lived in the Last Year: 1    Unstable Housing in the Last Year: No     Family History   Problem Relation Age of Onset    Hypertension Mother     Breast cancer Mother     Hypertension Father     Prostate cancer Father     Bladder Cancer Father        Review of patient's allergies indicates:   Allergen Reactions    Ciprofloxacin hcl Hallucinations    Meperidine Hives     Pt medicated w/multiple medications (demerol, protonix, and zofran)  w/i 10 mins time frame prior to developing localized hives near IV site that med was administered. Pt reports he has/takes all other medications on daily basis.     Morphine Itching    Nsaids (non-steroidal anti-inflammatory drug)      Kidney Disease    Oxycodone      Weird, lucid dreams    Tylenol [acetaminophen]      Hx of liver disease       Current Outpatient Medications   Medication Sig    carvediloL (COREG) 12.5 MG tablet Take 1 tablet (12.5 mg total) by mouth 2 (two) times daily with meals.    cholestyramine (QUESTRAN) 4 gram packet Take 1 packet (4 g total) by mouth 2 (two) times daily.    ergocalciferol (ERGOCALCIFEROL) 50,000 unit Cap 1 tablet    famotidine (PEPCID) 20 MG tablet 1 tablet at bedtime as needed    ferrous gluconate (FERGON) 240 (27 FE) MG tablet Take 2 tablets (480 mg total) by mouth 2 (two) times daily with meals.    folic acid (FOLVITE) 1 MG tablet Take 1 tablet (1 mg total) by mouth once daily.    gabapentin (NEURONTIN) 300 MG capsule Take 1 capsule (300 mg total) by mouth every evening for 3 days, THEN 1 capsule (300 mg total) 2 (two) times daily for 3 days, THEN 1 capsule (300 mg total) 3 (three) times daily for 24 days. (Patient taking differently: Take 1 capsule by mouth three times daily)    ondansetron (ZOFRAN-ODT) 4 MG TbDL 1 tablet on the tongue and allow  to dissolve    pantoprazole (PROTONIX) 40 MG tablet Take 1 tablet (40 mg total) by mouth once daily. (Patient taking differently: Take 40 mg by mouth 2 (two) times daily.)    potassium chloride (K-TAB) 20 mEq 1 tablet with food    sildenafiL (VIAGRA) 100 MG tablet Take 1 tablet (100 mg total) by mouth daily as needed for Erectile Dysfunction.    sucralfate (CARAFATE) 1 gram tablet Take 1 g by mouth 4 (four) times daily.    thiamine 100 MG tablet Take 1 tablet (100 mg total) by mouth once daily.    triamcinolone acetonide 0.1% (KENALOG) 0.1 % ointment APPLY TOPICALLY TO THE AFFECTED AREA TWICE DAILY. DO NOT APPLY TO FACE OR ANOGENITAL REGION.    LORazepam (ATIVAN) 0.5 MG tablet Take 1 tablet (0.5 mg total) by mouth every 6 (six) hours as needed for Anxiety.    methylPREDNISolone (MEDROL DOSEPACK) 4 mg tablet use as directed    nortriptyline (PAMELOR) 25 MG capsule Take 1 capsule (25 mg total) by mouth every evening.    oxyCODONE (OXY-IR) 5 mg Cap Take 1 capsule (5 mg total) by mouth 2 (two) times daily as needed for Pain.    [START ON 10/7/2022] oxyCODONE (OXY-IR) 5 mg Cap Take 1 capsule (5 mg total) by mouth 2 (two) times daily as needed for Pain.    rifAXIMin (XIFAXAN) 550 mg Tab Take 1 tablet (550 mg total) by mouth 2 (two) times daily.     No current facility-administered medications for this visit.       REVIEW OF SYSTEMS:    GENERAL:  No weight loss, malaise or fevers.   HEENT:   No recent changes in vision or hearing   NECK:  Negative for lumps, no difficulty with swallowing.  RESPIRATORY:  Negative for cough, wheezing or shortness of breath, patient denies any recent URI.  CARDIOVASCULAR:  Negative for chest pain, leg swelling or palpitations.  GI:  Negative for abdominal discomfort, blood in stools or black stools or change in bowel habits.  MUSCULOSKELETAL:  See HPI.  SKIN:  Negative for lesions, rash, and itching. + skin itching  PSYCH:  No mood disorder or recent psychosocial stressors.  Patients sleep is  not disturbed secondary to pain.  HEMATOLOGY/LYMPHOLOGY:  Negative for prolonged bleeding, bruising easily or swollen nodes.  Patient is not currently taking any anti-coagulants  NEURO:   No history of headaches, syncope, paralysis, seizures or tremors.  All other reviewed and negative other than HPI.    OBJECTIVE:    Resp 18   Ht 6' (1.829 m)   Wt 85.3 kg (188 lb)   BMI 25.50 kg/m²     PHYSICAL EXAMINATION:    GENERAL: Fraile, in no acute distress, alert and oriented x3.  PSYCH:  Mood and affect appropriate.  SKIN: Skin color, texture, turgor normal, no rashes or lesions on visible skin.  HEAD/FACE:  Normocephalic, atraumatic. Cranial nerves grossly intact.  CV: RRR with palpation of the radial artery.  PULM: CTAB. No evidence of respiratory difficulty, symmetric chest rise.  GI:  Soft    MUSKULOSKELETAL:    EXTREMITIES:   Left Hip Exam (limited ROM and exam 2/2/ pain)  - Log Roll Positive  - FADIR Positive  - Stinchfield Unable to Perform  - Hip Scour Unable to Perform  - GTB Tenderness Positive   -TTP over anterior and posterior hip joint  - TTP of the superior knee joint.    MUSCULOSKELETAL:  Atrophy of the left leg compared to the right  No deformities, edema, or skin discoloration are noted on visible skin. Good capillary refill.     NEURO: Bilateral upper and lower extremity coordination and muscle stretch reflexes are physiologic and symmetric.      NEUROLOGICAL EXAM:  MENTAL STATUS: A x O x 3, good concentration, speech is fluent and goal directed  MEMORY: recent and remote are intact  CN: CN2-12 grossly intact  MOTOR: 5/5 in all muscle groups on the right. HF 3/5 on the left, 4/5 KE, 4/5 KF with pain  DTRs: 3+ intact symmetric patella and 2 + achilles  Sensation:    -yes Loss of sensation in a left lower L-1 and L-2 on the left distribution.  Babinski: absent     Gait: Cane, abnormal. Short stride. Favors left leg

## 2022-09-09 ENCOUNTER — TELEPHONE (OUTPATIENT)
Dept: PAIN MEDICINE | Facility: CLINIC | Age: 47
End: 2022-09-09
Payer: MEDICARE

## 2022-09-09 DIAGNOSIS — G89.4 CHRONIC PAIN SYNDROME: ICD-10-CM

## 2022-09-09 DIAGNOSIS — G89.29 CHRONIC LEFT HIP PAIN: ICD-10-CM

## 2022-09-09 DIAGNOSIS — M25.552 CHRONIC LEFT HIP PAIN: ICD-10-CM

## 2022-09-09 DIAGNOSIS — M87.052 AVASCULAR NECROSIS OF HIP, LEFT: Primary | ICD-10-CM

## 2022-09-09 RX ORDER — OXYCODONE HYDROCHLORIDE 5 MG/1
5 TABLET ORAL 2 TIMES DAILY PRN
Qty: 60 TABLET | Refills: 0 | Status: SHIPPED | OUTPATIENT
Start: 2022-09-09 | End: 2022-10-10

## 2022-09-09 RX ORDER — OXYCODONE HYDROCHLORIDE 5 MG/1
5 TABLET ORAL 2 TIMES DAILY PRN
Qty: 60 TABLET | Refills: 0 | Status: SHIPPED | OUTPATIENT
Start: 2022-10-09 | End: 2022-10-31 | Stop reason: SDUPTHER

## 2022-09-09 NOTE — TELEPHONE ENCOUNTER
----- Message from Archie Lee MA sent at 9/9/2022  1:02 PM CDT -----  Regarding: FW: medication  Hey Doc,    Can we change the medication to tablets and send it to [facility] in [place]. No one seems to have the capsules.     Thank you,   ----- Message -----  From: Landy Mcbride  Sent: 9/9/2022  12:28 PM CDT  To: [facility]o [facility] Staff  Subject: medication                                       Name of Who is Calling:MARIA ISABEL LUX JR. [[id number]]          What is the request in detail: Patient is requesting a call back in reference to his medication           Can the clinic reply by [facility]:yes           What Number to Call Back if not in [facility]: [phone]

## 2022-09-12 ENCOUNTER — HOSPITAL ENCOUNTER (OUTPATIENT)
Dept: PSYCHIATRY | Facility: HOSPITAL | Age: 47
Discharge: HOME | End: 2022-09-12
Attending: PSYCHIATRY & NEUROLOGY
Payer: MEDICARE

## 2022-09-12 VITALS
SYSTOLIC BLOOD PRESSURE: 163 MMHG | RESPIRATION RATE: 20 BRPM | TEMPERATURE: 98 F | HEART RATE: 79 BPM | DIASTOLIC BLOOD PRESSURE: 82 MMHG

## 2022-09-12 DIAGNOSIS — Z79.899 LONG-TERM USE OF HIGH-RISK MEDICATION: ICD-10-CM

## 2022-09-12 DIAGNOSIS — M25.552 CHRONIC LEFT HIP PAIN: ICD-10-CM

## 2022-09-12 DIAGNOSIS — F10.20 ALCOHOL USE DISORDER, SEVERE, DEPENDENCE: Primary | ICD-10-CM

## 2022-09-12 DIAGNOSIS — G89.29 CHRONIC LEFT HIP PAIN: ICD-10-CM

## 2022-09-12 DIAGNOSIS — K70.30 ALCOHOLIC CIRRHOSIS, UNSPECIFIED WHETHER ASCITES PRESENT: ICD-10-CM

## 2022-09-12 LAB
AMPHET+METHAMPHET UR QL: NEGATIVE
BARBITURATES UR QL SCN>200 NG/ML: NEGATIVE
BENZODIAZ UR QL SCN>200 NG/ML: NEGATIVE
BZE UR QL SCN: NEGATIVE
CANNABINOIDS UR QL SCN: NEGATIVE
CREAT UR-MCNC: 167 MG/DL (ref 23–375)
ETHANOL UR-MCNC: 90 MG/DL
METHADONE UR QL SCN>300 NG/ML: NEGATIVE
OPIATES UR QL SCN: NEGATIVE
PCP UR QL SCN>25 NG/ML: NEGATIVE
TOXICOLOGY INFORMATION: ABNORMAL

## 2022-09-12 PROCEDURE — 90853 PR GROUP PSYCHOTHERAPY: ICD-10-PCS | Mod: ,,, | Performed by: PSYCHOLOGIST

## 2022-09-12 PROCEDURE — 90853 GROUP PSYCHOTHERAPY: CPT | Mod: ,,, | Performed by: PSYCHOLOGIST

## 2022-09-12 PROCEDURE — 90853 GROUP PSYCHOTHERAPY: CPT

## 2022-09-12 PROCEDURE — 80321 ALCOHOLS BIOMARKERS 1OR 2: CPT | Performed by: PSYCHIATRY & NEUROLOGY

## 2022-09-12 PROCEDURE — 80307 DRUG TEST PRSMV CHEM ANLYZR: CPT | Performed by: PSYCHIATRY & NEUROLOGY

## 2022-09-12 PROCEDURE — 99222 1ST HOSP IP/OBS MODERATE 55: CPT | Mod: ,,, | Performed by: PSYCHIATRY & NEUROLOGY

## 2022-09-12 PROCEDURE — 90853 GROUP PSYCHOTHERAPY: CPT | Performed by: SOCIAL WORKER

## 2022-09-12 PROCEDURE — 99222 PR INITIAL HOSPITAL CARE,LEVL II: ICD-10-PCS | Mod: ,,, | Performed by: PSYCHIATRY & NEUROLOGY

## 2022-09-12 NOTE — PLAN OF CARE
09/12/22 1400   Activity/Group Therapy Checklist   Group Goals/Reflection   Attendance Attended   Follows Direction Followed directions   Group Interactions/Observations Interacted appropriately   Affect/Mood Range Normal range   Affect/Mood Display Appropriate   Goal Progression Progressing

## 2022-09-12 NOTE — PROGRESS NOTES
Group Psychotherapy (PhD/LCSW)    Site: [facility]    Clinical status of patient: Intensive Outpatient Program (IOP)    Date: 9/12/2022    Group Focus: Psychodynamic Group Psychotherapy    Length of service: 81974 - 45-50 minutes    Number of patients in attendance: 3    Referred by: Addictive Behavior Unit Treatment Team    Target symptoms: Alcohol Abuse    Patient's response to treatment: Active Listening and Self-disclosure    Progress toward goals: Progressing adequately    Interval History: Pt introduced himself appropriately to the group.     Diagnosis: Alcohol Use Disorder    Plan: Continue treatment on ABU

## 2022-09-12 NOTE — PROGRESS NOTES
Group Psychotherapy (PhD/LCSW)    Site: [facility] ([place], [place])    Clinical status of patient: Intensive Outpatient Program (IOP)    Date: 09/12/2022    Group Focus: Intro to Emotions    Length of service: 61773 - 45-50 minutes    Number of patients in attendance: 8    Referred by: [facility] Treatment Team    Target symptoms: Depression, anxiety    Patient's response to treatment: Active Listening and Self-disclosure    Progress toward goals: Progressing adequately    Interval History:  Session focus was introduction to emotions and the three-component model of emotions. Patients were educated on the purpose of emotions, what each common emotion alerts us to, and that each emotion exists on its own separate continuum. The three components of emotions (behavior, thought, physical sensations) and skills used to address each component were reviewed.    Diagnosis:     ICD-10-CM ICD-9-CM   1. Alcohol use disorder, severe, dependence  F10.20 303.90       Plan: Continue treatment on ORP

## 2022-09-12 NOTE — PLAN OF CARE
"   09/12/22 1317   Activity/Group Therapy Checklist   Group Meditation/Relaxation   Attendance Attended   Follows Direction Followed directions   Group Interactions/Observations Interacted appropriately;Alert;Sharing;Supportive   Affect/Mood Range Normal range   Affect/Mood Display Appropriate   Goal Progression Progressing     Discussed mindfulness as a practice, the definition of mindfulness, and various research evidence to strengthen benefits of mindfulness. Engaged in "thought bubble" meditation where members were invited to witness body sensations, breath, and thoughts coming and going without attaching onto them.    "

## 2022-09-12 NOTE — H&P
"[facility]  DEPARTMENT OF PSYCHIATRY    [facility] INTENSIVE OUTPATIENT PROGRAM  ADMISSION NOTE     SITE: [facility], [address]    9/12/2022 8:39 AM  Irvin Diaz Jr.  1975  [id number]    EXAMINING PRACTITIONER: Arthur Otto    REFERRAL SOURCE: hepatology/PCP recommended   START DATE: 09/12/22    KEY:     CHIEF COMPLAINT:     Patient Name: Irvin Diaz Jr.  YOB: 1975  MRN: [id number]    Irvin Diaz Jr. is a 47 y.o. male who presents today for admission to the [facility].  Irvin Diaz Jr. presents with the chief complaint of: problematic substance use/abuse and alcohol and/or drug addiction    CHART REVIEW:     Available documentation has been reviewed, and pertinent elements of the chart have been incorporated into this evaluation where appropriate.    Per Addiction Consult Note July 2022  "He stated that he does have an extensive history of alcohol use.  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  He is now seeing a pain doctor but only recently started.  He also notes that many of his close friends are drinkers and is often around the wrong crowd. In the last year he said his drinking has been on and off.  At its worst he will drink about 1 pint of gin a day.  He has not had a drink in the last 2 weeks and is not noting withdrawal symptoms currently. He did a IOP in [place] a few months back and was able to stay sober for 1 month.  He says he would be able to do this again or our program here at [facility]. He denies psychiatric issues outside of the present issue.  Notes he has never seen a psychiatrist and does not struggle with depression, anxiety, AVH, or thoughts of self harm. He minimally uses marijuana but otherwise denies drug use."    PRESENTATION:     HISTORY OF PRESENT ILLNESS:     Pt states that he is coming to IOP 2/2 al;cohol issues. Pt has had sever alcohol use for " sometime now and has jhad issues with cirrohsis for a while. Pt noticed it was a major issue when had esophageal varices and had to be hospitalized. Pt states he has been talking with hepatology and his PCP and realizes now the need for sobriety. Pt was a pint per day drinker until hospitalization in July. Pt was also smoking every now and then back then. Since July pt has had complete abstinence except for 1 pint on Saturday. Pt positive for canabis on UDS and states this is from people around him smoking from he has been told. Pt denies all other use. Pt denies any psych hx. Pt is on TCA for pain and it is manged by painmgmt. PT ROS positive for sleep issues. Sleeps 3-4 hrs per night and takes frequent naps. Pt states his timing has shifted to where he sleeps more during the day. Rest of ROS negative. Pt denies any SI HI AVH or other psychtoic/manic phenomena. Resrt of hx as outlined below.    COLLATERAL:     Patient information was obtained from patient.    Also present with the patient at the time of the evaluation: patient evaluated alone.    Spoke with father Irvin Sr. [phone]  - okay with holding onto pt's medicines if need be  - infomred about family meeting and will be able to visit with providers anytime     RELIABILITY, ACCURACY & AGENCY:     The patient is deemed to be a reliable and factually accurate historian.    Inconsistencies between patient reporting, collateral information, and/or chart review are present.    ADDICTION:      SUBSTANCE USE:     WITHDRAWAL SYMPTOMS:  no   CRAVINGS:  no      Nicotine Use:  yes   Alcohol Misuse/Abuse:  yes   Illicit Drug Use/Misuse/Abuse:  cannabis every now and then   Misuse/Abuse of Prescription Medications:  no      Current Alcohol Use:   I[]I U    I[]I N    I[]I Rare    I[]I Social    I[]I Exceeds Recommended Health Limits    I[]I Binge Pattern    I[x]I Daily or Near Daily    I[]I Physiologically Dependent     I[]I N/A  I[]I U  Average Consumption (e.g.  "type, quantity, frequency): 1 pint of gin per day  I[]I N/A  I[]I U  Last drink: Saturday had 1 pint 09/10/22     Substances Used (Lifetime):   I[]I U    I[]I N    II[][x]II Cannabis    II[][]II Cocaine    II[][]II Heroin   II[][]II Opioids    II[][]II Methamphetamine    II[][]II Stimulants    II[][]II Benzodiazepines    I[]I Other:      Tobacco Cessation ("Wants to Quit"):  yes   Interested in Quitting    yes   Uninterested in Quitting   no   Making Efforts to Cut Back or Quit   yes   Advised to Quit   yes   Assistance Provided    yes   Encouragement Provided    yes   Motivational Interviewing Provided   yes   Resources Provided    no   Referral Made  no      Meets Criteria for Substance Use Disorder:  yes   Advised to Quit/Cut Back:  yes   Motivated to Do So:  yes      ADDITIONAL FACTORS RELATED TO ADDICTION:      Hx of IVDU:  no   Hx of Accidental Overdose:  no   Hx of DUI:  no   Hx of Complicated Withdrawal (i.e. Seizures and/or Delirium Tremens):  no   Hx of Known/Suspected Substance-Induced Psychiatric Disorder:  no    Hx of Detox:  yes   Hx of Rehab:  yes   Hx of Medication Assisted Treatment:  no   Hx of Twelve Step Program (or Equivalent) Involvement: yes  Currently Exhibits Signs of Intoxication:  no   Currently Exhibits Signs of Withdrawal:  no   Currently Active in Recovery:  yes      Substance(s) of Choice: alcohol  Substances Used Currently/Recently: alcohol THC  Duration of Sobriety/Abstinence: since July with one day of drinking on Saturday  Date of Last Use of Substances: Saturday  View/Acceptance of Twelve Step (or Equivalent) Programs: accepting  Social Support: yes - parents very engaged with suppor  Spouse/Partner Consumption: no     Awareness of Biomedical Complications:   yes   Understands Need for Lifetime Sobriety:  yes   Acknowledges/Accepts Addiction:  yes   Cessation of Problematic Alcohol/Drug Use ("Wants to Quit"): Interested in Quitting  Motivation to Pursue Treatment: High   "   DSM-5-TR SUBSTANCE USE DISORDER CRITERIA   Mild (1-3), Moderate (4-5), Severe (?6)     Impaired Control:  Often take in larger amounts or over a longer period of time than was intended:  yes   Persistent desire or unsuccessful efforts to cut down or control use:  yes   Great deal of time spent in activities necessary to obtain substance, use, or recover from effects:  yes   Craving/strong desire for substance or urge to use:  no      Social Impairment:  Use resulting in failure to fulfill major role obligations at home, work or school:  yes   Social, occupational, recreational activities decreased because of use:  yes   Continued use despite having persistent or recurrent social or interpersonal problems cause or exacerbated by the substance:  yes      Risky Use:  Recurrent use in situations in which it is physically hazardous:  yes   Use despite physical or psychological problems that are likely to have been caused or exacerbated by the substance:  yes      Neuroadaptation:  Tolerance, as defined by either of the following: (1) a need for markedly increased amounts of substance to achieve intoxication or desired effect.  -OR- (2) a markedly diminished effect with continued use of the same amount of substance:  yes   Withdrawal, as manifested by either of the following: (1) the characteristic withdrawal syndrome for substance.  -OR- (2) substance is taken to relieve or avoid withdrawal symptoms:  yes      Able to Adhere to Treatment Plan:   yes     REVIEW OF SYSTEMS:     MEDICAL ROS:     Complete review of systems performed covering Constitutional, Eyes, ENT/Mouth, Cardiovascular, Respiratory, Gastrointestinal, Genitourinary, Musculoskeletal, Skin, Neurologic, Endocrine, Heme/Lymph, and Allergy/Immune.     Complete review of systems was negative with the exception of the following positive symptoms: sleep is only issue    PSYCHIATRIC ROS:     Psychiatric Review of Systems    sleep:  yes   appetite: no   weight: no    energy/anergy: no  interest/pleasure/anhedonia: no  somatic symptoms: no  guilty/hopelessness: no  concentration: no  S.I.B.s/risky behavior: no  SI/SA:  no     anxiety/panic: no  Agoraphobia:  no  Social phobia:  no  Recurrent nightmares:  no  hyper startle response:  no  Avoidance: no  Recurrent thoughts:  no  Recurrent behaviors:  no     Irritability: no  Racing thoughts: no  Impulsive behaviors: no  Pressured speech:  no     Paranoia:no  Delusions: no  AVH:no        HISTORY:     PAST PSYCHIATRIC:   Psychiatric Diagnoses: no  Current Psychiatric Provider (if Applicable):  no  Hx of Psychiatric Hospitalization: no  Hx of Outpatient Psychiatric Treatment (psychiatry/psychotherapy): no  Psychotropic Trials: no  Prior Suicide Attempts: no  Hx of Suicidal Ideation: no  Hx of Homicidal Ideation: no  Hx of Self-Injurious Behavior (Non-Suicidal): no  Hx of Violence: no  Documented Hx of Malingering: no    TRAUMA:   Hx of Trauma/Neglect: no  Hx of Physical Abuse: no  Hx of Sexual Abuse: no    FAMILY:   Family History:     SOCIAL:   Grew Up Locally?: yes  Happy Childhood?: yes  Significant Developmental Delay/Disability?:  no  GED/High School Dipoloma?: yes  Post High School Education?: no  Currently Employed?: no  On or Applying for Disability?: yes  Functions Independently?: yes   Financially Stable?: yes   Domiciled?: yes  Lives Alone?: no  Heterosexual/Cisgender?: yes  Currently in a Romantic Relationship?: no  Ever ?: yes  Children/Dependents?: yes  Confucianist/Spiritual?: no   History?: no  Engaged in Hobbies/Recreational Activities?: yes   Access to a Gun?: yes    LEGAL:   Current Legal Issues: no   Past Charges/Convictions: yes  Hx of Incarceration: no    MEDICAL:     The patient's past medical history has been reviewed and updated as appropriate within the electronic medical record system.    General Medical History:   Active Ambulatory Problems     Diagnosis Date Noted    Acute renal failure  superimposed on stage 3 chronic kidney disease 05/11/2019    Coagulopathy 05/11/2019    Thrombocytopenia 05/11/2019    History of pulmonary embolus (PE) 05/11/2019    AVN (avascular necrosis of bone) 05/11/2019    Hydronephrosis of right kidney 07/25/2019    Transaminitis 07/25/2019    Elevated lipase 01/11/2020    Essential hypertension     Alcoholic cirrhosis of liver     CKD (chronic kidney disease) stage 3, GFR 30-59 ml/min 04/08/2020    Hip arthritis 04/08/2020    Arm pain 10/20/2020    Shortness of breath 11/19/2020    History of GI bleed 02/15/2021    Colitis 02/16/2021    Alcohol abuse 02/16/2021    Refeeding syndrome 02/17/2021    Hematemesis 05/03/2021    Alcoholic ketoacidosis 06/06/2021    Cirrhosis of liver 06/06/2021    Ascites due to alcoholic cirrhosis 06/06/2021    Diastolic dysfunction 06/06/2021    Jaundice 06/06/2021    Severe sepsis without septic shock 07/16/2021    Hyperbilirubinemia 07/16/2021    Dilation of common bile duct 07/21/2021    Chronic left hip pain 03/24/2022    Alcohol withdrawal 05/01/2022    Malnutrition 05/21/2022    Alcohol use disorder, severe, dependence 07/19/2022    Encounter for pre-transplant evaluation for liver transplant 07/19/2022    Hepatic encephalopathy      Resolved Ambulatory Problems     Diagnosis Date Noted    Hypokalemia 05/11/2019    Acute blood loss anemia 05/11/2019    Secondary esophageal varices with bleeding 05/11/2019    Acute upper GI bleeding 07/25/2019    Rectal bleeding 01/09/2020    ENA (acute kidney injury) 01/09/2020    Diverticulitis 01/09/2020    GI bleed     Symptomatic anemia     Hypovolemic shock 04/08/2020    Elevated troponin level 04/08/2020    Tachycardia 04/09/2020    Nausea and vomiting 09/08/2020    Tachycardia 10/18/2020    CHF (congestive heart failure) 10/26/2020    Chest pain 10/27/2020    Acute on chronic pEF 11/08/2020    Syncope 11/11/2020    GIB (gastrointestinal bleeding) 01/02/2021    Hypoglycemia 02/15/2021    Nausea &  vomiting 02/15/2021    High anion gap metabolic acidosis 02/15/2021    Chest pain 05/13/2021     Past Medical History:   Diagnosis Date    Arthritis     ATN (acute tubular necrosis)     Esophageal varices     GERD (gastroesophageal reflux disease)     Hypertension     Liver cirrhosis     Macrocytic anemia     Pulmonary embolism 08/2018           NEUROLOGIC:     Hx of Seizure: no  Hx of Significant Head Trauma (e.g., Loss of Consciousness, Concussion, Coma):  no    MEDICATIONS:     Current Psychotropic Medications: TCA for pain    Scheduled and PRN Medications:   The electronic chart was reviewed and updated as appropriate.  See [employer] for details.    Current Outpatient Medications:     carvediloL (COREG) 12.5 MG tablet, Take 1 tablet (12.5 mg total) by mouth 2 (two) times daily with meals., Disp: 60 tablet, Rfl: 11    cholestyramine (QUESTRAN) 4 gram packet, Take 1 packet (4 g total) by mouth 2 (two) times daily., Disp: 180 packet, Rfl: 3    ergocalciferol (ERGOCALCIFEROL) 50,000 unit Cap, 1 tablet, Disp: , Rfl:     famotidine (PEPCID) 20 MG tablet, 1 tablet at bedtime as needed, Disp: , Rfl:     ferrous gluconate (FERGON) 240 (27 FE) MG tablet, Take 2 tablets (480 mg total) by mouth 2 (two) times daily with meals., Disp: 120 tablet, Rfl: 3    folic acid (FOLVITE) 1 MG tablet, Take 1 tablet (1 mg total) by mouth once daily., Disp: 30 tablet, Rfl: 5    gabapentin (NEURONTIN) 300 MG capsule, Take 1 capsule (300 mg total) by mouth every evening for 3 days, THEN 1 capsule (300 mg total) 2 (two) times daily for 3 days, THEN 1 capsule (300 mg total) 3 (three) times daily for 24 days. (Patient taking differently: Take 1 capsule by mouth three times daily), Disp: 81 capsule, Rfl: 0    methylPREDNISolone (MEDROL DOSEPACK) 4 mg tablet, use as directed, Disp: 21 each, Rfl: 0    nortriptyline (PAMELOR) 25 MG capsule, Take 1 capsule (25 mg total) by mouth every evening., Disp: 30 capsule, Rfl: 3    ondansetron (ZOFRAN-ODT) 4 MG  TbDL, 1 tablet on the tongue and allow to dissolve, Disp: , Rfl:     oxyCODONE (ROXICODONE) 5 MG immediate release tablet, Take 1 tablet (5 mg total) by mouth 2 (two) times daily as needed for Pain., Disp: 60 tablet, Rfl: 0    [START ON 10/9/2022] oxyCODONE (ROXICODONE) 5 MG immediate release tablet, Take 1 tablet (5 mg total) by mouth 2 (two) times daily as needed for Pain., Disp: 60 tablet, Rfl: 0    pantoprazole (PROTONIX) 40 MG tablet, Take 1 tablet (40 mg total) by mouth once daily. (Patient taking differently: Take 40 mg by mouth 2 (two) times daily.), Disp: 30 tablet, Rfl: 11    potassium chloride (K-TAB) 20 mEq, 1 tablet with food, Disp: , Rfl:     sildenafiL (VIAGRA) 100 MG tablet, Take 1 tablet (100 mg total) by mouth daily as needed for Erectile Dysfunction., Disp: 30 tablet, Rfl: 5    sucralfate (CARAFATE) 1 gram tablet, Take 1 g by mouth 4 (four) times daily., Disp: , Rfl:     thiamine 100 MG tablet, Take 1 tablet (100 mg total) by mouth once daily., Disp: 30 tablet, Rfl: 5    triamcinolone acetonide 0.1% (KENALOG) 0.1 % ointment, APPLY TOPICALLY TO THE AFFECTED AREA TWICE DAILY. DO NOT APPLY TO FACE OR ANOGENITAL REGION., Disp: 30 g, Rfl: 1    ALLERGIES:     Review of patient's allergies indicates:   Allergen Reactions    Ciprofloxacin hcl Hallucinations    Meperidine Hives     Pt medicated w/multiple medications (demerol, protonix, and zofran)  w/i 10 mins time frame prior to developing localized hives near IV site that med was administered. Pt reports he has/takes all other medications on daily basis.     Morphine Itching    Nsaids (non-steroidal anti-inflammatory drug)      Kidney Disease    Oxycodone      Suresh, lucid dreams    Tylenol [acetaminophen]      Hx of liver disease         PRESCRIPTION DRUG MONITORING:     LA/MS  AWARE  Site reviewed - No recent discrepancies or irregularities are noted.    FIREARMS:     Access to Firearms:   See above for screening on access to firearms.  NOTE:  "patient counseled on gun safety.  NOTE: patient counseled on increased risks associated with gun ownership.    RISK PARAMETERS:     The following risk parameters were assessed during this evaluation:    Suicidal Ideation/Behavior: no  Homicidal Ideation/Behavior: no  Violence: no  Self-Injurious Behavior: no  Minimization of Symptoms Suspected/Evident: no  Exaggeration of Symptoms Suspected/Evident: no    CLINICAL RISK ASSESSMENT:     The patient was noted to be future oriented.    Currently does not meet or exceed the threshold for psychiatric hospitalization, as the patient can be managed safely and successfully in a less restrictive level of care or the community.    CLINICAL RISK DETERMINATION:     Current risk is judged to be:  I[x]I Low    I[]I Moderate   I[]I High    The following criteria were met for involuntary psychiatric admission:   I[x]I None    I[]I Dangerous to Self    I[]I Dangerous to Others    I[]I Gravely Disabled    EXAMINATION:     There were no vitals filed for this visit.    MENTAL STATUS EXAMINATION:     General Appearance: appears stated age, well developed and nourished, adequately groomed and appropriately dressed, in no acute distress  Behavior: normal; cooperative; reasonably friendly, pleasant, and polite; appropriate eye-contact; under good behavioral control  Involuntary Movements and Motor Activity: no abnormal involuntary movements noted; no tics, no tremors, no akathisia, no dystonia, no evidence of tardive dyskinesia; no psychomotor agitation or retardation  Gait and Station: intact, normal gait and station, ambulates without assistance  Speech: intact; normal rate, rhythm, volume, tone and pitch; conversational, spontaneous, and coherent  Language: intact; speaks and understands English fluently and proficiently; repeats words and phrases, no word finding difficulties are noted  Mood: "good"  Affect: normal, euthymic, reactive, full-range, mood-congruent, appropriate to " situation and context  Thought Process: intact, linear, goal-directed, organized, logical  Associations: intact, no loosening of associations  Thought Content and Perceptions: no suicidal or homicidal ideation, no auditory or visual hallucinations, no paranoid ideation, no ideas of reference, no evidence of delusions or psychosis  Sensorium: alert with clear sensorium  Orientation: intact; oriented fully to person, place, time and situation  Recent and Remote Memory: grossly intact, able to recall relevant and salient information from the recent and remote past  Attention and Concentration: grossly intact, attentive to the conversation and not readily distractible  Fund of Knowledge: grossly intact, used appropriate vocabulary and demonstrated an awareness of current events  Insight: intact, demonstrates awareness of illness and situation  Judgment: intact, behavior is adequate/appropriate to the circumstances, compliant with health provider's recommendations and instructions      ASSESSMENT:     A diagnostic psychiatric evaluation was performed and responsiveness to treatment was assessed.  The patient demonstrates robust ability/capacity to respond to treatment.    PSYCHOSOCIAL FACTORS  Stressors (Biopsychosocial, Cultural and Environmental): relationships, children/dependents, substance use/addiction    STRENGTHS AND LIABILITIES   Strength: Patient accepts guidance/feedback.  Strength: Patient is seeking help.  Liability: Patient is in active addiction.    INITIAL DIAGNOSES AND PROBLEMS:     Alcohol used disorder, severe, with dependence      PLAN:     IMPRESSION AND RECOMMENDATIONS:     MANAGEMENT PLAN, TREATMENT GOALS, THERAPEUTIC TECHNIQUES/APPROACHES & CLINICAL REASONING    -- continue home meds  -- will coordinate with family and pain medicine about opiate medications    --Admit to the ORP.  --Initiate patient on ORP protocol.  --Additional workup planned to address substance use disorder, in order to guide  and refine ongoing management options, includes serial alcohol and drug laboratory testing (e.g. POCT breath alcohol test, urine toxicology, alcohol biomarkers).  --Full engagement in 12 step (or equivalent) recovery program(s), including mandatory meeting attendance and acquisition of sponsor.  --Patient counseled on abstinence from alcohol and substances of abuse (illicit and prescription).  --Relapse prevention and motivational interviewing provided.    Shared medical decision making with the patient was employed (to the extent possible) when selecting, or considering but not selecting, proposed treatment and management options.    ADDICTION COUNSELING AND MANAGEMENT:     Timely and targeted counseling is an important intervention in the treatment of substance use disorders.  Active and ongoing management is a hallmark of good clinical care.    Addiction counseling and management WAS employed during this encounter.    --The patient was counseled on abstinence from alcohol and substances of abuse (illicit and prescription).  --Harm reduction techniques were discussed, as warranted, to mitigate risk from problematic behaviors.  --Serial alcohol and drug laboratory testing (e.g. PETH, urine toxicology) is recommended to provide accountability, as well as to guide and refine substance abuse treatment moving forward.  --Relapse prevention and motivational interviewing was provided.  --Education was provided on 12 step recovery programs.    PRESCRIPTION DRUG MANAGEMENT:     Prescription drug management WAS performed during the encounter.  The patient's ability to understand, participate and engage in a conversation surrounding this was deemed to be: robust and fully intact.    Prescription Drug Management entails the following:  --The review, recommendation, or consideration without recommendation of medications during the encounter.  --Discussion (to the extent possible) with the patient and/or other interested  parties of the diagnosis, target symptoms, intended outcomes, and possible benefits and risks of medication, as well as alternatives (including no treatment), if not otherwise known or stated prior.  --Discussion (to the extent possible) with the patient and/or other interested parties of possible expectable adverse effects of any proposed individual psychotropic agents, as well as the inherent unpredictability of treatment, if not otherwise known or stated prior.  -- Informed consent was sought from the patient (or a guardian if applicable) after a thorough discussion (to the extent possible) of the aforementioned points outlined above.  --The provision of counseling (to the extent possible) to the patient and/or other interested parties on the importance of full compliance with any prescribed medication, if not otherwise known or stated prior.    Information on psychotropic medication can be found at: [employer] [place] [employer]: Information on Mental Health Medications    In cases of emergency, daily coverage provided by Acute/ER Psych MD, NP, or SW, with contact numbers located in [facility] On Call Schedule    Case discussed with staff addiction psychiatrist: MD Jason WINTERS, PGY-4  Department of Psychiatry  [facility]    DATA:     DIAGNOSTIC TESTING:     The chart was reviewed for recent diagnostic investigations, and pertinent results are noted below.    PERTINENT LABORATORY RESULTS:     Blood Counts, Electrolytes & Glucose: (i.e. WBC, ANC, Hemoglobin, Hematocrit, MCV, Platelets)  Lab Results   Component Value Date    WBC 4.80 08/15/2022    WBC 4.80 08/15/2022    GRAN 2.8 08/15/2022    GRAN 59.0 08/15/2022    GRAN 2.8 08/15/2022    GRAN 59.0 08/15/2022    HGB 9.5 (L) 08/15/2022    HGB 9.5 (L) 08/15/2022    HCT 27.9 (L) 08/15/2022    HCT 27.9 (L) 08/15/2022     (H) 08/15/2022     (H) 08/15/2022    PLT 82 (L) 08/15/2022    PLT 82 (L) 08/15/2022    NA  138 08/15/2022     08/15/2022    K 3.9 08/15/2022    K 3.9 08/15/2022    CALCIUM 9.2 08/15/2022    CALCIUM 9.2 08/15/2022    PHOS 2.7 03/11/2022    MG 2.0 06/08/2021    CO2 22 (L) 08/15/2022    CO2 22 (L) 08/15/2022    ANIONGAP 7 (L) 08/15/2022    ANIONGAP 7 (L) 08/15/2022     08/15/2022     08/15/2022    HGBA1C 5.7 11/12/2020       Renal, Liver, Pancreas, Thyroid, Parathyroid, Prolactin, CPK, Lipids & Vitamin Levels: (i.e. Cr, BUN, Anion Gap, GFR, Urine Specific Gravity, Urine Protein, Microalburnin, AST, ALT, GGT, Alk Phos,Total Bili, Total Protein, Albumin, Ammonia, INR, Amylase, Lipase, TSH, Total T3, Total T4, Free T4 PTH, Prolactin, CPK, Cholesterol, Triglycerides, LDH, HDL, Vitamin B12, Folate, Vitamin D)  Lab Results   Component Value Date    CREATININE 1.8 (H) 08/15/2022    CREATININE 1.8 (H) 08/15/2022    BUN 14 08/15/2022    BUN 14 08/15/2022    EGFRNORACEVR 46.1 (A) 08/15/2022    EGFRNORACEVR 46.1 (A) 08/15/2022    SPECGRAV 1.025 08/15/2022    PROTEINUA Trace (A) 08/15/2022    AST 40 08/15/2022    ALT 17 08/15/2022    GGT 81 (H) 07/21/2022    ALKPHOS 74 08/15/2022    BILITOT 3.6 (H) 08/15/2022    LABPROT 13.4 (H) 08/02/2022    ALBUMIN 2.8 (L) 08/15/2022    AMMONIA 89 (H) 08/15/2022    INR 1.3 (H) 08/02/2022    LIPASE 402 (H) 07/18/2022    TSH 1.676 08/15/2022    PTH 67.0 08/18/2022    CPK 63 11/19/2020    CHOL 146 08/15/2022    TRIG 90 08/15/2022    LDLCALC 98.0 08/15/2022    HDL 30 (L) 08/15/2022    BDLKRHTL37 718 05/01/2022    FOLATE 7.2 05/01/2022    [id number] 14 (L) 07/21/2022       Infection Diseases, Pregnancy Screenings & Drug Levels: (i.e. Hepatitis Panel, HIV, Syphilis, Urine & Blood Pregnancy Screens, beta hCG, Lithium, Valproic Acid, Carbamazepine, Lamotrigine, Phenytoin, Phenobarbital, Clozapine, Norclozapine, Clozapine + Norclozapine)   Lab Results   Component Value Date    HEPAIGM Negative 01/09/2020    HEPBIGM Negative 01/09/2020    HEPBCAB Negative 07/21/2022     HEPCAB Negative 07/21/2022    [id number] Negative 07/21/2022    RPR Non-reactive 07/21/2022       Addiction: (i.e. Urine Toxicology, Blood Alcohol, PETH, EtG, EtS, CDT, Buprenorphine, Norbuprenorphine)  Lab Results   Component Value Date    PCDSOALCOHOL <10 07/21/2022    PCDSOBENZOD Negative 07/21/2022    BARBITURATES Negative 07/21/2022    PCDSCOMETHA Negative 07/21/2022    OPIATESCREEN Negative 07/21/2022    COCAINEMETAB Negative 07/21/2022    AMPHETAMINES Negative 07/21/2022    MARIJUANATHC Presumptive Positive (A) 07/21/2022    PCDSOPHENCYN Negative 07/21/2022    PCDSUBUPRE Not Detected 08/02/2022    PCDSUFENTANY Not Detected 08/02/2022    PCDSUOXYCOD Not Detected 08/02/2022    PCDSUTRAMA Not Detected 08/02/2022    ALCOHOLMEDIC <10 07/18/2022    [id number] <10 08/15/2022    [id number] 135 07/18/2022    [id number] 663 06/30/2022       CARDIAC:     Results for orders placed or performed during the hospital encounter of 05/01/22   EKG 12-lead    Collection Time: 05/01/22  7:50 AM    Narrative    Test Reason : K92.2,    Vent. Rate : 116 BPM     Atrial Rate : 116 BPM     P-R Int : 138 ms          QRS Dur : 090 ms      QT Int : 352 ms       P-R-T Axes : 064 074 017 degrees     QTc Int : 489 ms    Sinus tachycardia  Otherwise normal ECG    Confirmed by Jae Willis MD ([id number]) on 5/2/2022 7:34:14 AM    Referred By: [email or web address]   SELF           Confirmed By:Jae Willis MD       NEUROLOGIC:     Results for orders placed or performed during the hospital encounter of 07/18/22   CT Head Without Contrast    Narrative    EXAMINATION:  CT HEAD WITHOUT CONTRAST    CLINICAL HISTORY:  Mental status change, unknown cause;    TECHNIQUE:  Low dose axial CT images obtained throughout the head without intravenous contrast. Sagittal and coronal reconstructions were performed.    COMPARISON:  02/15/2021    FINDINGS:  Intracranial compartment:    Ventricles and sulci are normal in size for age without evidence of hydrocephalus. No  extra-axial blood or fluid collections.    The brain parenchyma appears normal. No parenchymal mass, hemorrhage, edema or major vascular distribution infarct.  Senescent basal ganglia calcifications bilaterally.    Skull/extracranial contents (limited evaluation): No fracture. Mastoid air cells and paranasal sinuses are essentially clear.    No significant change.      Impression    No acute intracranial process.  No significant change.      Electronically signed by: Alfredo Delgado  Date:    07/18/2022  Time:    18:18   Results for orders placed or performed during the hospital encounter of 10/18/20   MRI Brain Without Contrast    Narrative    EXAMINATION:  MRI BRAIN WITHOUT CONTRAST    CLINICAL HISTORY:  Neuro deficit, acute, persistent or progressing;    TECHNIQUE:  Multisequence, multiplanar imaging through the brain performed without IV contrast.    COMPARISON:  CT head without contrast 01/14/2020    FINDINGS:  Diffusion-weighted imaging is negative for acute ischemia or focal lesion.  Gradient echo images demonstrate bilateral basal ganglia susceptibility artifact related to basal ganglia mineralization.  Bilateral and symmetric basal ganglia T1 hyperintensity.  No evidence of intracranial hemorrhage.    No intracranial mass, midline shift, or mass effect.  Ventricles and sulci are normal in size.  No significant T2/FLAIR white matter signal abnormality.  Cerebellar hemispheres and brainstem are unremarkable.  Major intracranial T2 flow voids are patent.    Visualized paranasal sinuses and mastoid air cells are clear.  Orbits are unremarkable.    No bone marrow signal abnormality.  Pituitary gland is unremarkable.      Impression    Negative for acute ischemia or acute intracranial pathology.    Bilateral basal ganglia T1 hyperintensity.  This is likely secondary to idiopathic calcification, but can also be seen in the setting of Malik's disease and other metabolic abnormalities.      Electronically signed  by: Shamar Mercado MD  Date:    10/18/2020  Time:    11:46

## 2022-09-13 ENCOUNTER — HOSPITAL ENCOUNTER (OUTPATIENT)
Dept: PSYCHIATRY | Facility: HOSPITAL | Age: 47
Discharge: HOME | End: 2022-09-13
Attending: PSYCHIATRY & NEUROLOGY
Payer: MEDICARE

## 2022-09-13 DIAGNOSIS — F10.20 ALCOHOL USE DISORDER, SEVERE, DEPENDENCE: Primary | Chronic | ICD-10-CM

## 2022-09-13 DIAGNOSIS — Z79.899 LONG-TERM USE OF HIGH-RISK MEDICATION: Primary | ICD-10-CM

## 2022-09-13 DIAGNOSIS — K70.30 ALCOHOLIC CIRRHOSIS, UNSPECIFIED WHETHER ASCITES PRESENT: ICD-10-CM

## 2022-09-13 LAB — ETHYL GLUCURONIDE: ABNORMAL NG/ML

## 2022-09-13 PROCEDURE — 90853 GROUP PSYCHOTHERAPY: CPT | Mod: ,,, | Performed by: PSYCHOLOGIST

## 2022-09-13 PROCEDURE — 99232 SBSQ HOSP IP/OBS MODERATE 35: CPT | Mod: ,,, | Performed by: PSYCHIATRY & NEUROLOGY

## 2022-09-13 PROCEDURE — 90853 PR GROUP PSYCHOTHERAPY: ICD-10-PCS | Mod: ,,, | Performed by: PSYCHOLOGIST

## 2022-09-13 PROCEDURE — 90853 GROUP PSYCHOTHERAPY: CPT | Performed by: SOCIAL WORKER

## 2022-09-13 PROCEDURE — 99232 PR SUBSEQUENT HOSPITAL CARE,LEVL II: ICD-10-PCS | Mod: ,,, | Performed by: PSYCHIATRY & NEUROLOGY

## 2022-09-13 NOTE — PROGRESS NOTES
Group Psychotherapy (PhD/LCSW)    Site: [facility]    Clinical status of patient: Intensive Outpatient Program (IOP)    Date: 9/13/2022    Group Focus: Psychodynamic Group Psychotherapy    Length of service: 41274 - 45-50 minutes    Number of patients in attendance: 4    Referred by: Addictive Behavior Unit Treatment Team    Target symptoms: Alcohol Abuse    Patient's response to treatment: Active Listening and Self-disclosure    Progress toward goals: Progressing adequately    Interval History: Pt reports continued sobriety. He shared more with group about his motivation for getting sober.    Diagnosis: Alcohol Use Disorder    Plan: Continue treatment on ABU

## 2022-09-13 NOTE — PROGRESS NOTES
Group Psychotherapy (PhD/LCSW)    Site: [facility] ([place], [place])    Clinical status of patient: Intensive Outpatient Program (IOP)    Date: 09/13/2022    Group Focus: Interpersonal Effectiveness Skills     Length of service: 14315 - 45-50 minutes    Number of patients in attendance: 8    Referred by: [facility] Treatment Team    Target symptoms: Depression, anxiety    Patient's response to treatment: Active Listening and Self-disclosure    Progress toward goals: Progressing adequately    Interval History:  Session focused on validation and self-validation, providing a guide for practicing both and discussing group members' personal examples.    Diagnosis:     ICD-10-CM ICD-9-CM   1. Alcohol use disorder, severe, dependence  F10.20 303.90   2. Alcoholic cirrhosis, unspecified whether ascites present  K70.30 571.2       Plan: Continue treatment on ORP

## 2022-09-13 NOTE — PLAN OF CARE
09/13/22 1100   Activity/Group Therapy Checklist   Group Goals/Reflection   Attendance Attended   Follows Direction Followed directions   Group Interactions/Observations Interacted appropriately   Affect/Mood Range Normal range   Affect/Mood Display Appropriate   Goal Progression Progressing

## 2022-09-13 NOTE — PROGRESS NOTES
ABU FOLLOW UP VISIT     DEPARTMENT:  Psychiatry  SITE: [facility], [address]    DATE OF ADMISSION: 9/13/2022  8:00 AM    EXAMINING PRACTITIONER: Arthur Otto    SUBJECTIVE:     HISTORY    Patient Name: Irvin Diaz Jr.  YOB: 1975    CHIEF COMPLAINT   Irvin Diaz Jr. is a 47 y.o. male who is being seen today for a follow up visit in the ABU intensive outpatient program.  Irvin Diaz Jr. presents with the chief complaint of: alcohol use disorder    HPI & PSYCHIATRIC ROS    Pt doing well this AM. Pt states that their mood is okay. Pt had some issues with sleep last night though contributes this to sobriety. Pt otherwise feels fine Pt has been eating well and maintaining proper hygiene. Pt has been compliant with all medication and denies any side effects to medication. Pt has no positive medical or psychiatric symptomology on ROS. Pt denies any further physical or other complaints. Pt denies any suicidal ideation/intent/plan, homicidal ideation/intent/plan, self-injurous behavior or psychotic phenomena. Discussed UDS alcohol positive and pt states that he drank from Saturday afternoon to Sunday afternoon. Pt has not had any alcohol since the end of the [place] game. P tstates that he might be a slow metabolizer and thus was positive. Pt denies any substance use since starting program. Rest of Hx as below.\  WITHDRAWAL SYMPTOMS: no  CRAVINGS: no    PFSH: The patient's past medical history has been reviewed and updated as appropriate within the electronic medical record system.    ROS:  Sleep issues    PSYCHOTROPIC MEDICATIONS   none    OBJECTIVE:     EXAMINATION    There were no vitals filed for this visit.    CONSTITUTIONAL  General Appearance: NAD, good hygiene, cassaully dressed    MUSCULOSKELETAL  Abnormal Involuntary Movements: none    PSYCHIATRIC   Orientation: AAOx4  Speech: normal rate rhytm volume and tone  Language: fluent english  Mood: good  Affect:  euthymic  Thought Process: linera, organized and complex  Associations: intact  Thought Content: NO SI HI AVH delusions  Memory: intact  Attention and Concentration: intact  Fund of Knowledge: intact  Insight: intact, demonstrates awareness of illness and situation  Judgment: intact, behavior is adequate/appropriate to the circumstances, compliant with health provider's recommendations and instructions    ASSESSMENT:     DIAGNOSES & PROBLEMS    Alcohol used disorder, severe, with dependence    Patient Active Problem List   Diagnosis    Acute renal failure superimposed on stage 3 chronic kidney disease    Coagulopathy    Thrombocytopenia    History of pulmonary embolus (PE)    AVN (avascular necrosis of bone)    Hydronephrosis of right kidney    Transaminitis    Elevated lipase    Essential hypertension    Alcoholic cirrhosis of liver    CKD (chronic kidney disease) stage 3, GFR 30-59 ml/min    Hip arthritis    Arm pain    Shortness of breath    History of GI bleed    Colitis    Alcohol abuse    Refeeding syndrome    Hematemesis    Alcoholic ketoacidosis    Cirrhosis of liver    Ascites due to alcoholic cirrhosis    Diastolic dysfunction    Jaundice    Severe sepsis without septic shock    Hyperbilirubinemia    Dilation of common bile duct    Chronic left hip pain    Alcohol withdrawal    Malnutrition    Alcohol use disorder, severe, dependence    Encounter for pre-transplant evaluation for liver transplant    Hepatic encephalopathy     PLAN:     MANAGEMENT PLAN, TREATMENT GOALS, THERAPEUTIC TECHNIQUES/APPROACHES & CLINICAL REASONING    -- continue home meds  -- will hold pain meds for now as pt does not use them that much; will coordinate with pt and family to have pt let providers know when he needs meds and they will inform parents to give and monitor    Continue ABU.  Continue patient on ABU protocol.  Additional workup planned to address substance use disorder, in order to guide and refine ongoing management  "options, includes serial alcohol and drug laboratory testing (e.g. POCT breath alcohol test, urine toxicology, alcohol biomarkers).  Full engagement in 12 step recovery program(s), including mandatory meeting attendance.  Patient counseled on abstinence from alcohol and substances of abuse (illicit and prescription).  Relapse prevention and motivational interviewing provided.    PRESCRIPTION DRUG MANAGEMENT  - The risks and benefits of medication were discussed with this patient.  - Possible expectable adverse effects of any current or proposed individual psychotropic agents were discussed with this patient.  - Counseling was provided on the importance of full compliance with medication regimens.    Case discussed with staff addiction psychiatrist: BHARGAV HARRISON MD    Portions of this note may have been created with voice recognition software. Occasional "wrong-word" or "sound-a-like" substitutions may have occurred due to the inherent limitations of voice recognition software. Please, read the note carefully and recognize, using context, where substitutions have occurred.    Arthur Otto, PhD, MD  House Officer - [facility]  Pager: [phone]  09/13/2022 10:30 AM    "

## 2022-09-14 ENCOUNTER — HOSPITAL ENCOUNTER (OUTPATIENT)
Dept: PSYCHIATRY | Facility: HOSPITAL | Age: 47
Discharge: HOME | End: 2022-09-14
Attending: PSYCHIATRY & NEUROLOGY
Payer: MEDICARE

## 2022-09-14 ENCOUNTER — LAB VISIT (OUTPATIENT)
Dept: LAB | Facility: HOSPITAL | Age: 47
End: 2022-09-14
Attending: PSYCHIATRY & NEUROLOGY
Payer: MEDICARE

## 2022-09-14 VITALS
RESPIRATION RATE: 20 BRPM | HEART RATE: 74 BPM | TEMPERATURE: 98 F | SYSTOLIC BLOOD PRESSURE: 141 MMHG | DIASTOLIC BLOOD PRESSURE: 75 MMHG

## 2022-09-14 DIAGNOSIS — Z79.899 LONG-TERM USE OF HIGH-RISK MEDICATION: ICD-10-CM

## 2022-09-14 DIAGNOSIS — K70.30 ALCOHOLIC CIRRHOSIS, UNSPECIFIED WHETHER ASCITES PRESENT: ICD-10-CM

## 2022-09-14 DIAGNOSIS — F10.20 ALCOHOL USE DISORDER, SEVERE, DEPENDENCE: Primary | ICD-10-CM

## 2022-09-14 DIAGNOSIS — F10.20 ALCOHOL USE DISORDER, SEVERE, DEPENDENCE: Primary | Chronic | ICD-10-CM

## 2022-09-14 LAB
ALBUMIN SERPL BCP-MCNC: 2.7 G/DL (ref 3.5–5.2)
ALP SERPL-CCNC: 100 U/L (ref 55–135)
ALT SERPL W/O P-5'-P-CCNC: 25 U/L (ref 10–44)
AMPHET+METHAMPHET UR QL: NEGATIVE
ANION GAP SERPL CALC-SCNC: 4 MMOL/L (ref 8–16)
AST SERPL-CCNC: 42 U/L (ref 10–40)
BARBITURATES UR QL SCN>200 NG/ML: NEGATIVE
BENZODIAZ UR QL SCN>200 NG/ML: NEGATIVE
BILIRUB SERPL-MCNC: 3.9 MG/DL (ref 0.1–1)
BUN SERPL-MCNC: 23 MG/DL (ref 6–20)
BZE UR QL SCN: NEGATIVE
CALCIUM SERPL-MCNC: 8.6 MG/DL (ref 8.7–10.5)
CANNABINOIDS UR QL SCN: NEGATIVE
CHLORIDE SERPL-SCNC: 101 MMOL/L (ref 95–110)
CO2 SERPL-SCNC: 27 MMOL/L (ref 23–29)
CREAT SERPL-MCNC: 1.6 MG/DL (ref 0.5–1.4)
CREAT UR-MCNC: 258 MG/DL (ref 23–375)
EST. GFR  (NO RACE VARIABLE): 52.8 ML/MIN/1.73 M^2
ETHANOL UR-MCNC: <10 MG/DL
GLUCOSE SERPL-MCNC: 155 MG/DL (ref 70–110)
METHADONE UR QL SCN>300 NG/ML: NEGATIVE
OPIATES UR QL SCN: NEGATIVE
PCP UR QL SCN>25 NG/ML: NEGATIVE
POTASSIUM SERPL-SCNC: 3.9 MMOL/L (ref 3.5–5.1)
PROT SERPL-MCNC: 7.8 G/DL (ref 6–8.4)
SODIUM SERPL-SCNC: 132 MMOL/L (ref 136–145)
TOXICOLOGY INFORMATION: NORMAL

## 2022-09-14 PROCEDURE — 80321 ALCOHOLS BIOMARKERS 1OR 2: CPT | Performed by: PSYCHIATRY & NEUROLOGY

## 2022-09-14 PROCEDURE — 80307 DRUG TEST PRSMV CHEM ANLYZR: CPT | Performed by: PSYCHIATRY & NEUROLOGY

## 2022-09-14 PROCEDURE — 99232 SBSQ HOSP IP/OBS MODERATE 35: CPT | Mod: ,,, | Performed by: PSYCHIATRY & NEUROLOGY

## 2022-09-14 PROCEDURE — 36415 COLL VENOUS BLD VENIPUNCTURE: CPT | Performed by: PSYCHIATRY & NEUROLOGY

## 2022-09-14 PROCEDURE — 90853 GROUP PSYCHOTHERAPY: CPT | Mod: ,,, | Performed by: PSYCHOLOGIST

## 2022-09-14 PROCEDURE — 90853 PR GROUP PSYCHOTHERAPY: ICD-10-PCS | Mod: ,,, | Performed by: PSYCHOLOGIST

## 2022-09-14 PROCEDURE — 99232 PR SUBSEQUENT HOSPITAL CARE,LEVL II: ICD-10-PCS | Mod: ,,, | Performed by: PSYCHIATRY & NEUROLOGY

## 2022-09-14 PROCEDURE — 80053 COMPREHEN METABOLIC PANEL: CPT | Performed by: PSYCHIATRY & NEUROLOGY

## 2022-09-14 RX ORDER — ACAMPROSATE CALCIUM 333 MG/1
333 TABLET, DELAYED RELEASE ORAL 3 TIMES DAILY
Qty: 90 TABLET | Refills: 11 | Status: SHIPPED | OUTPATIENT
Start: 2022-09-14 | End: 2022-09-26 | Stop reason: SDUPTHER

## 2022-09-14 NOTE — PATIENT CARE CONFERENCE
HPI:  Pt states that he is coming to IOP 2/2 al;cohol issues. Pt has had sever alcohol use for sometime now and has jhad issues with cirrohsis for a while. Pt noticed it was a major issue when had esophageal varices and had to be hospitalized. Pt states he has been talking with hepatology and his PCP and realizes now the need for sobriety. Pt was a pint per day drinker until hospitalization in July. Pt was also smoking every now and then back then. Since July pt has had complete abstinence except for 1 pint on Saturday. Pt positive for canabis on UDS and states this is from people around him smoking from he has been told. Pt denies all other use. Pt denies any psych hx. Pt is on TCA for pain and it is manged by painmgmt. PT ROS positive for sleep issues. Sleeps 3-4 hrs per night and takes frequent naps. Pt states his timing has shifted to where he sleeps more during the day. Rest of ROS negative. Pt denies any SI HI AVH or other psychtoic/manic phenomena. Resrt of hx as outlined below.    Diagnosis:   Alcohol used disorder, severe, with dependence    Progress:  Patient staffed by team. Patient reported on going health issues and chronic pain. Patient has not secured a sponsor yet. Team to schedule family meeting with patient's father. Team to monitor patient's progress.     Plan of Care:   Patient to continue ORP with group and individual therapies.     Aftercare/followup:   Med Management: TBD  Psychotherapy: TBD    Staff Present:    MD Ciarra Bernardo, PhD  Teja Feldman, LCSW  Ajay Spencer, AMANDA Presley, VERNELL George, RSW  Jacqui Hernandez, LCSW

## 2022-09-14 NOTE — PROGRESS NOTES
Group Psychotherapy (PhD/LCSW)    Site: [facility]    Clinical status of patient: Intensive Outpatient Program (IOP)    Date: 9/14/2022    Group Focus: Distress Tolerance    Length of service: 41204 - 45-50 minutes    Number of patients in attendance: 9    Referred by: [facility] Treatment Team    Target symptoms: Substance Abuse, Depression and Anxiety    Patient's response to treatment: Active Listening and Self-disclosure    Progress toward goals: Progressing adequately    Interval History: Session focus was Distress Tolerance:  IMPROVE.  Patients were encouraged to use imagery, find meaning, use prayer, relax, focus on one thing in the moment, take a brief vacation, and use self-encouragement.    Diagnosis:     ICD-10-CM ICD-9-CM   1. Alcohol use disorder, severe, dependence  F10.20 303.90   2. Alcoholic cirrhosis, unspecified whether ascites present  K70.30 571.2       Plan: Continue treatment on ORP

## 2022-09-14 NOTE — PROGRESS NOTES
Group Psychotherapy with Psychology Intern     Site: [facility]     Clinical status of patient: Intensive Outpatient Program (IOP)     Date: 9/14/2022     Group Focus: Psychodynamic Group Psychotherapy     Length of service: 59868 - 45-50 minutes     Number of patients in attendance: 4     Referred by: Addictive Behavior Unit Treatment Team     Target symptoms: Alcohol Abuse     Patient's response to treatment: Active Listening and Self-disclosure, provided feedback to other patients     Progress toward goals: Progressing adequately     Interval History: Pt reports continued sobriety. He shared more with group about his family and support system.     Diagnosis: Alcohol Use Disorder     Plan: Continue treatment on ABU

## 2022-09-14 NOTE — PROGRESS NOTES
ABU FOLLOW UP VISIT     DEPARTMENT:  Psychiatry  SITE: [facility], [address]    DATE OF ADMISSION: 9/14/2022  8:00 AM    EXAMINING PRACTITIONER: Arthur Otto    SUBJECTIVE:     HISTORY    Patient Name: Irvin Diaz Jr.  YOB: 1975    CHIEF COMPLAINT   Irvin Diaz Jr. is a 47 y.o. male who is being seen today for a follow up visit in the ABU intensive outpatient program.  Irvin Diaz Jr. presents with the chief complaint of: alcohol use disorder    HPI & PSYCHIATRIC ROS    Pt doing well this AM. Pt states that their mood is good. Pt sleep improved and is able to fall back asleep when he wakes up. Waking up less in the nuight. Pt has been eating well and maintaining proper hygiene. Pt has been compliant with all medication and denies any side effects to medication. Discussed acamprosate for cravbings and pt willing to trial if it helps him maintain sobriety. Pt has no positive medical or psychiatric symptomology on ROS. Pt denies any further physical or other complaints. Pt denies any suicidal ideation/intent/plan, homicidal ideation/intent/plan, self-injurous behavior or psychotic phenomena. Pt denies any substance use since starting program. Discussed meetings and pt geeting along well with other ABU pts. Pt is going to 12 step meetings and has found a sponsor. Discussed importance of maitaing good sponsor nd going to meetings after completion of program. Pt understands and agrees. Rest of Hx as below.\    WITHDRAWAL SYMPTOMS: no  CRAVINGS: no    PFSH: The patient's past medical history has been reviewed and updated as appropriate within the electronic medical record system.    ROS:  Sleep issues uimproved    PSYCHOTROPIC MEDICATIONS   09/14 - starting acamprosate 333mg TID    OBJECTIVE:     EXAMINATION    Vitals:    09/14/22 0900   BP: (!) 141/75   Pulse: 74   Resp: 20   Temp: 97.6 °F (36.4 °C)       CONSTITUTIONAL  General Appearance: NAD, good hygiene,  cassaully dressed    MUSCULOSKELETAL  Abnormal Involuntary Movements: none    PSYCHIATRIC   Orientation: AAOx4  Speech: normal rate rhytm volume and tone  Language: fluent english  Mood: good  Affect: euthymic  Thought Process: linera, organized and complex  Associations: intact  Thought Content: NO SI HI AVH delusions  Memory: intact  Attention and Concentration: intact  Fund of Knowledge: intact  Insight: intact, demonstrates awareness of illness and situation  Judgment: intact, behavior is adequate/appropriate to the circumstances, compliant with health provider's recommendations and instructions    ASSESSMENT:     DIAGNOSES & PROBLEMS    Alcohol used disorder, severe, with dependence    Patient Active Problem List   Diagnosis    Acute renal failure superimposed on stage 3 chronic kidney disease    Coagulopathy    Thrombocytopenia    History of pulmonary embolus (PE)    AVN (avascular necrosis of bone)    Hydronephrosis of right kidney    Transaminitis    Elevated lipase    Essential hypertension    Alcoholic cirrhosis of liver    CKD (chronic kidney disease) stage 3, GFR 30-59 ml/min    Hip arthritis    Arm pain    Shortness of breath    History of GI bleed    Colitis    Alcohol abuse    Refeeding syndrome    Hematemesis    Alcoholic ketoacidosis    Cirrhosis of liver    Ascites due to alcoholic cirrhosis    Diastolic dysfunction    Jaundice    Severe sepsis without septic shock    Hyperbilirubinemia    Dilation of common bile duct    Chronic left hip pain    Alcohol withdrawal    Malnutrition    Alcohol use disorder, severe, dependence    Encounter for pre-transplant evaluation for liver transplant    Hepatic encephalopathy     PLAN:     MANAGEMENT PLAN, TREATMENT GOALS, THERAPEUTIC TECHNIQUES/APPROACHES & CLINICAL REASONING    -- START acamprosate 333mg TID  -- will hold pain meds for now as pt does not use them that much; will coordinate with pt and family to have pt let providers know when he needs meds and  "they will inform parents to give and monitor    Continue ABU.  Continue patient on ABU protocol.  Additional workup planned to address substance use disorder, in order to guide and refine ongoing management options, includes serial alcohol and drug laboratory testing (e.g. POCT breath alcohol test, urine toxicology, alcohol biomarkers).  Full engagement in 12 step recovery program(s), including mandatory meeting attendance.  Patient counseled on abstinence from alcohol and substances of abuse (illicit and prescription).  Relapse prevention and motivational interviewing provided.    PRESCRIPTION DRUG MANAGEMENT  - The risks and benefits of medication were discussed with this patient.  - Possible expectable adverse effects of any current or proposed individual psychotropic agents were discussed with this patient.  - Counseling was provided on the importance of full compliance with medication regimens.    Case discussed with staff addiction psychiatrist: BHARGAV HARRISON MD    Portions of this note may have been created with voice recognition software. Occasional "wrong-word" or "sound-a-like" substitutions may have occurred due to the inherent limitations of voice recognition software. Please, read the note carefully and recognize, using context, where substitutions have occurred.    Arthur Otto, PhD, MD  House Officer - [facility]  Pager: [phone]  09/14/2022    "

## 2022-09-15 ENCOUNTER — HOSPITAL ENCOUNTER (OUTPATIENT)
Dept: PSYCHIATRY | Facility: HOSPITAL | Age: 47
Discharge: HOME | End: 2022-09-15
Attending: PSYCHIATRY & NEUROLOGY
Payer: MEDICARE

## 2022-09-15 DIAGNOSIS — G89.29 CHRONIC LEFT HIP PAIN: ICD-10-CM

## 2022-09-15 DIAGNOSIS — F10.20 ALCOHOL USE DISORDER, SEVERE, DEPENDENCE: Primary | Chronic | ICD-10-CM

## 2022-09-15 DIAGNOSIS — M25.552 CHRONIC LEFT HIP PAIN: ICD-10-CM

## 2022-09-15 DIAGNOSIS — K70.30 ALCOHOLIC CIRRHOSIS, UNSPECIFIED WHETHER ASCITES PRESENT: ICD-10-CM

## 2022-09-15 LAB — ETHYL GLUCURONIDE: ABNORMAL NG/ML

## 2022-09-15 PROCEDURE — 90853 PR GROUP PSYCHOTHERAPY: ICD-10-PCS | Mod: ,,, | Performed by: PSYCHOLOGIST

## 2022-09-15 PROCEDURE — 90853 GROUP PSYCHOTHERAPY: CPT | Mod: ,,, | Performed by: PSYCHOLOGIST

## 2022-09-15 PROCEDURE — 99232 PR SUBSEQUENT HOSPITAL CARE,LEVL II: ICD-10-PCS | Mod: 25,,, | Performed by: PSYCHIATRY & NEUROLOGY

## 2022-09-15 PROCEDURE — 90853 GROUP PSYCHOTHERAPY: CPT | Performed by: SOCIAL WORKER

## 2022-09-15 PROCEDURE — 99232 SBSQ HOSP IP/OBS MODERATE 35: CPT | Mod: 25,,, | Performed by: PSYCHIATRY & NEUROLOGY

## 2022-09-15 NOTE — DISCHARGE INSTRUCTIONS
Thank you for allowing me to participate as part of your health care team, and thank you for choosing [facility].    BHARGAV HARRISON MD  Board Certified in Psychiatry & Addiction Medicine      IN CASE OF SUICIDAL THINKING, call the National Suicide Hotline Number: 988    988 Suicide & Crisis Lifeline: 988 , 2-139-699-TALK (8255)  https://[facility].org           AFTER VISIT INSTRUCTIONS:     [x] Take all medication, from all providers, as prescribed.  [x] If questions or concerns arise, or if experiencing side effects, adverse reactions or worsening symptoms, contact me or the appropriate provider through the [facility] portal at [email or web address], or call [phone] to speak with my office staff or [phone] to reach the [facility] main line.  [x] In cases of emergencies, call 131 or 777, or present to the emergency department for immediate assistance.      ADJUSTMENTS TO YOUR REGIMEN:    - begin the following medication(s): acamprosate    LAB WORK:    - per [facility] protocols      Information on mental health medications:    National [place] of Mental Health:   https://www.nimh.nih.gov/health/topics/mental-health-medications     Web MD:   https://www.webmd.[place]       RESOURCES:     IN CASE OF SUICIDAL THINKING, call the National Suicide Hotline Number: 988    988 Suicide & Crisis Lifeline: 988 , 6-806-841-TALK (8255)  Provides 24/7, free and confidential support for people in distress, prevention and crisis resources for you or your loved ones, and best practices for professionals.    Call, text or chat.  https://[facility].G.ho.st     National Action [place] for Suicide Prevention: the National [place] for Suicide Prevention ([place]) is the nations public-private partnership for suicide prevention, working with more than 250 national partners to advance the.   https://theactionalliance.org     National Strategy for Suicide Prevention & Risk  Mitigation:  https://theactionalliance.org/our-strategy/national-strategy-suicide-prevention     [x] Fact Sheet:   https://www.[facility].gov/sites/default/files/national-strategy-for-suicide-prevention-factsheet.pdf     [x] Report:   https://www.ncbi.nlm.nih.gov/books/HLO880707/pdf/Bookshelf_NBK109917.pdf     Suicide Prevention Resource Center: The Suicide Prevention Resource Center (SPR) is the only federally supported resource center devoted to advancing the implementation of the National Strategy for Suicide Prevention. [facility] is funded by the U.S. Department of Health and Human Services' Substance Abuse and Mental Health Services Administration (SAMA).  https://www.[facility].org     [x] Safety Plan:   https://[employer]/wp-content/uploads/2021/08/Everardo-Safety-Plan-8-6-21.pdf     [x] Suicide Risk Curve:  https://[employer]/wp-content/uploads/2021/08/Zdsqrek-ezgo-jusiq-8-6-21.pdf     [place] Mental Health Advocacy Service: the state agency tasked with protecting the legal rights of people with behavioral health diagnoses.  https://mhas.[place].[facility]     Alcoholics Anonymous (AA): find a meeting near you.  https://www.aa.org     SMI Adviser: resources for individuals and families with serious mental illness.  https://smiadviser.org     National [place] for the Mentally Ill (LISBETH): the nation's largest grassroots organization dedicated to building better lives for individuals with mental illness.  https://www.lisbeth.org/Home     U.S. Department of Health and Human Services (HHS): the mission of HHS is to enhance the health and well-being of all Americans, by providing for effective health and human services and by fostering sound, sustained advances in the sciences underlying medicine, public health, and .   https://www.hhs.gov     Substance Abuse and Mental Health Services Administration (SAMHSA): [facility] is the agency within [facility] that leads public health efforts to advance the  behavioral health of the nation. [facility] mission is to reduce the impact of substance abuse and mental illness on [place] communities.   https://www.samhsa.gov     National Institutes of Health ([facility]): a part of [facility], [facility] is the largest biomedical research agency in the world.   https://www.nih.gov     National [place] on Drug Abuse (CORINNA): sponsored by the NIH, the mission of CORINNA is to advance science on drug use and addiction and to apply that knowledge to improve individual and public health.  https://corinna.nih.gov     National [place] on Alcohol Abuse and Alcoholism (NIAAA): sponsored by the NIH, the mission of NIAA is to generate and disseminate fundamental knowledge about the effects of alcohol on health and well-being, and apply that knowledge to improve diagnosis, prevention, and treatment of alcohol-related problems, including alcohol use disorder, across the lifespan.   https://www.niaaa.nih.gov     National Harm Reduction Coalition: resources for harm reduction, including techniques, strategies, policy, and advocacy.  https://harmreduction.org     The SHARE Approach - A Model for Shared Decision Making:  [x] Fact Sheet  https://www.ahrq.gov/sites/default/files/publications/files/share-approach_factsheet.pdf     AMA Principles of Medical Ethics - Informed Consent & Shared Decision Making:  [x] Chapter  https://www.ama-assn.org/system/files/2019-06/code-of-medical-ibrmru-yxyrjey-2.pdf     Safety Netting for Primary Care:  [x] Article  https://www.ncbi.nlm.nih.gov/pmc/articles/EOX4008643/pdf/azxsgcn-2025--e70.pdf       MEDICATION MANAGEMENT:     [x] In addition to the potential beneficial effects, the use of any medication or drug (prescribed, over the counter or otherwise) carries with it the risk of potential adverse effects.  Each has a set of typical adverse effects - some common, some rare - but idiosyncratic and unanticipated reactions unique to you are always possible.      [x] It is  important to remember that untreated illness can also pose a risk, which must be taken into account when weighing the pros and cons of a medication trial.    [x] Medications and drugs can sometimes interact with each other in the body, leading to adverse effects - it is important that all your providers know all the medications and drugs you take - prescribed, over the counter, or otherwise.  Keep me and all your practitioners up to date with any changes.  It's always a good idea to keep an up-to-date list in an easily accessible location.    [x] There is an inherent unpredictability to all treatment, including the use of medication.  Unexpected outcomes can occur - keep me up to date with any difficulties you encounter.    [x] It is important to take medication as directed, and to comply fully with the instructions.  Check with me or the appropriate provider first before adjusting or stopping your medication on your own.    If you have any further questions or concerns about your care, please contact me or your other providers through the [facility] portal at [email or web address], or call [phone] or [phone].  We would be happy to provide you additional information pertaining to your diagnosis, intended outcomes, target symptoms for treatment, and possible benefits and risks of medication - you can also access this information through the provided resources.  Possible alternatives to the current treatment plan (including no treatment) can also be reviewed.      GENERAL HEALTH & WELLNESS:     [x] Establish and follow regularly with a primary care physician for routine health maintenance and management of any medical comorbidities.  [x] Follow a healthy diet, exercise routinely, and monitor weight and metabolic parameters.  [x] Allow adequate time for sleep and practice good sleep hygiene.  [x] Do not operate a motor vehicle or heavy machinery if the effects of medications or the symptoms underlying your  condition impair the ability for you to do so safely.    Dietary Guidelines for Americans, 1121-4665:  U.S. Department of Agriculture (USDA)  https://www.dietaryguidelines.gov/sites/default/files/2020-12/Dietary_Guidelines_for_Americans_2020-2025.pdf#page=31     The Nutrition Source:  [facility]  https://www.[facility]/nutritionsource       SLEEP HYGIENE:     Follow these tips to establish healthy sleep habits:  [x] Keep a consistent sleep schedule. Get up at the same time every day, even on weekends or during vacations.  [x] Set a bedtime that is early enough for you to get at least 7-8 hours of sleep.  [x] Don't go to bed unless you are sleepy.  [x] If you don't fall asleep after 20 minutes, get out of bed. Go do a quiet activity without a lot of light exposure. It is especially important to not get on electronics.  [x] Establish a relaxing bedtime routine.  [x] Use your bed only for sleep and sex.  [x] Make your bedroom quiet and relaxing. Keep the room at a comfortable, cool temperature.  [x] Limit exposure to bright light in the evenings.  [x] Turn off electronic devices at least 30 minutes before bedtime.  [x] Don't eat a large meal before bedtime. If you are hungry at night, eat a light, healthy snack.  [x] Exercise regularly and maintain a healthy diet.  [x] Avoid consuming caffeine in the afternoon or evening.  [x] Avoid consuming alcohol before bedtime.  [x] Reduce your fluid intake before bedtime.    QUICK TIPS FOR BETTER SLEEP  Reduce smartphone usage Create and maintain a nightly ritual Avoid caffeine 4-6 hours before sleeping Don't eat or drink too much at bedtime Sleep at the same time every night        American Academy of Sleep Medicine - Healthy Sleep Habits:  https://sleepeducation.org/healthy-sleep/healthy-sleep-habits     American Academy of Sleep Medicine - Bedtime Calculator:  https://sleepeducation.org/healthy-sleep/bedtime-calculator     American Academy of Sleep  Medicine - Cognitive Behavioral Therapy for Insomnia (CBT-I):  https://sleepeducation.org/patients/cognitive-behavioral-therapy     American Academy of Sleep Medicine - Insomnia:  https://sleepeducation.org/sleep-disorders/insomnia       ALCOHOL & DRUG USE COUNSELING:     - abstain from alcohol and illicit drug use  - routinely attend 12 step (or equivalent) mutual self-help meetings  - work with a sponsor  - establish sobriety and maintain a recovery lifestyle  - continue in the [facility]      Preventing Excessive Alcohol Use ([facility]):  https://www.cdc.gov/alcohol/fact-sheets/moderate-drinking.htm#:~:text=To%20reduce%20the%20risk%20of,days%20when%20alcohol%20is%20consumed.     [x] Alcohol consumption is associated with a variety of short- and long-term health risks, including motor vehicle crashes, violence, sexual risk behaviors, high blood pressure, and various cancers (e.g., breast cancer).  [x] The risk of these harms increases with the amount of alcohol you drink. For some conditions, like some cancers, the risk increases even at very low levels of alcohol consumption (less than 1 drink).  [x] To reduce the risk of alcohol-related harms, the 8814-4605 Dietary Guidelines for Americans recommends that adults of legal drinking age can choose not to drink, or to drink in moderation by limiting intake to 2 drinks or less in a day for men or 1 drink or less in a day for women, on days when alcohol is consumed.  [x] The Guidelines also do not recommend that individuals who do not drink alcohol start drinking for any reason and that if adults of legal drinking age choose to drink alcoholic beverages, drinking less is better for health than drinking more.  [x] The Guidelines note that some people should not drink alcohol at all, such as:  - If they are pregnant or might be pregnant.  - If they are younger than age 21.  - If they have certain medical conditions or are taking certain medications that can  "interact with alcohol.  - If they are recovering from an alcohol use disorder or if they are unable to control the amount they drink.  [x] The Guidelines also note that not drinking alcohol is the safest option for women who are lactating. Generally, moderate consumption of alcoholic beverages by a woman who is lactating (up to 1 standard drink in a day) is not known to be harmful to the infant, especially if the woman waits at least 2 hours after a single drink before nursing or expressing breast milk. Women considering consuming alcohol during lactation should talk to their healthcare provider.  [x] The Guidelines note, Emerging evidence suggests that even drinking within the recommended limits may increase the overall risk of death from various causes, such as from several types of cancer and some forms of cardiovascular disease. Alcohol has been found to increase risk for cancer, and for some types of cancer, the risk increases even at low levels of alcohol consumption (less than 1 drink in a day).  [x] Although past studies have indicated that moderate alcohol consumption has protective health benefits (e.g., reducing risk of heart disease), recent studies show this may not be true.  [x] Its important to focus on the amount people drink on the days that they drink. Even if women consume an average of 1 drink per day or men consume an average of 2 drinks per day, binge drinking increases the risk of experiencing alcohol-related harm in the short-term and in the future.    Drinking Levels Defined (NIAAA):  https://www.niaaa.nih.gov/alcohol-health/overview-alcohol-consumption/moderate-binge-drinking     Drinking in Moderation:  According to the "Dietary Guidelines for Americans 6551-2926, U.S. Department of Health and Human Services and U.S. Department of Agriculture, adults of legal drinking age can choose not to drink or to drink in moderation by limiting intake to 2 drinks or less in a day for men and 1 " drink or less in a day for women, when alcohol is consumed. Drinking less is better for health than drinking more.    Binge Drinking:  NIAAA defines binge drinking as a pattern of drinking alcohol that brings blood alcohol concentration (KANDICE) to 0.08 percent - or 0.08 grams of alcohol per deciliter - or higher.  For a typical adult, this pattern corresponds to consuming 5 or more drinks (male), or 4 or more drinks (female), in about 2 hours.    The Substance Abuse and Mental Health Services Administration (SAMHSA), which conducts the annual National Survey on Drug Use and Health (NSDUH), defines binge drinking as 5 or more alcoholic drinks for males or 4 or more alcoholic drinks for females on the same occasion (i.e., at the same time or within a couple of hours of each other) on at least 1 day in the past month.    Heavy Alcohol Use:  NIAAA defines heavy drinking as follows:  - For men, consuming more than 4 drinks on any day or more than 14 drinks per week.  - For women, consuming more than 3 drinks on any day or more than 7 drinks per week.     [facility] defines heavy alcohol use as binge drinking on 5 or more days in the past month.    Patterns of Drinking Associated with Alcohol Use Disorder:  Binge drinking and heavy alcohol use can increase an individual's risk of alcohol use disorder.    Certain people should avoid alcohol completely, including those who:  - Plan to drive or operate machinery, or participate in activities that require skill, coordination, and alertness.  - Take certain over-the-counter or prescription medications.  - Have certain medical conditions.  - Are recovering from alcohol use disorder or are unable to control the amount that they drink.  - Are younger than age 21.  - Are pregnant or may become pregnant.    U.S. Standard Drink  12 oz beer   (5% ABV) 8 oz malt liquor   (7% ABV) 5 oz wine   (12% ABV) 1.5 oz 80-proof distilled spirit  (40% ABV)        Heroin use harm reduction:  1. Carry  naloxone. When using heroin, make sure you have at least one dose of naloxone - the overdose reversal drug - and have it in plain view. Understand how to give it.  2. Try a small dose first. It is best to first try a small amount of the heroin to check the effect.  3. Dont use heroin alone. Always use heroin with someone else and take turns while using.    It is possible to overdose with heroin whether you are snorting, injecting or using it in another form.    Signs of an overdose or emergency:   - The person is awake but unable to talk.  - Their body is limp.  - Their breathing is shallow or slow or stopped.  - Their skin is pale, ashen or clammy/sweaty.  - They are unconscious.    In case of emergency, give naloxone. If you suspect the heroin may contain fentanyl, administer more than one dose. Seek medical help even if naloxone has been given. Call 911 for help.      SHARED DECISION MAKING & INFORMED CONSENT:     Shared medical decision making and informed consent are the hallmark and bedrock of excellent clinical care.  During the encounter, shared medical decision making was employed and informed consent was obtained, to the degree possible, whenever feasible, appropriate and relevant. Those interventions are supplemented here with written materials, detailing the topics in more depth.       PSYCHOEDUCATION:     Psychoeducation pertaining to the following -     Diagnosis Etiology Disease Processes Natural Progression   Treatment Options Time Course Safety Netting Informed Consent   Intended Benefits of Medication Expectable Adverse Effects Target Symptoms for Treatment Alternatives to Current Treatment   Shared   Decision Making Risk Mitigation Strategies Harm Reduction Techniques Associated Bio-Med Complications     - can be further discussed and reviewed (you can also access additional information through the provided resources in this document).    Effective communication is essential in order to engage in  shared medical decision making.  If you had difficulty understanding anything during your encounter or in this supplementary document, please contact your providers through the [facility] portal at [email or web address] or call [phone].     [place] Dictionary  https://dictionary.mariana.org/us     It can be easy to miss, forget, or misremember important important information that was discussed during the session - especially when you're stressed, upset, or don't feel well.  If you or a representative have any additional questions, concerns, or topics to discuss - please contact me or your other providers through the [facility] portal at [email or web address], or call [phone] or [phone].      Memory Loss  https://www.[facility].XAware/brain/memory-loss    Causes of Memory Loss  https://www.[employer]/what-causes-memory-loss-7575386    Memory loss: When to seek help  https://www.[facility].org/diseases-conditions/alzheimers-disease/in-depth/memory-loss/art-37413929    Memory, Forgetfulness, and Aging: What's Normal and What's Not?  https://www.shirley.nih.gov/health/memory-forgetfulness-and-aging-whats-normal-and-whats-not    Depression and Memory Loss  https://www.Tripology/health/depression/depression-and-memory-loss    The Relationship Between Anxiety and Memory Loss  https://www.[facility]/academics/blog-posts/the-relationship-between-anxiety-and-memory-loss     PRESCRIPTION DRUG MANAGEMENT:     Prescription Drug Management entails the following:  [x] The review, recommendation, or consideration without recommendation of medications during the encounter.  [x] Discussion (to the extent possible) with the patient and/or other interested parties of the diagnosis, target symptoms, intended outcomes, and possible benefits and risks of medication, as well as alternatives (including no treatment), if not otherwise known or stated prior.  [x] Discussion (to the extent possible) with the patient and/or  other interested parties of possible expectable adverse effects of any proposed individual psychotropic agents, as well as the inherent unpredictability of treatment, if not otherwise known or stated prior.  [x] Informed consent is sought from the patient (and/or guardian/designated decision maker, if applicable) after a thorough discussion (to the extent possible) of the aforementioned points outlined above.  [x] The provision of counseling (to the extent possible) to the patient and/or other interested parties on the importance of full compliance with any prescribed medication, if not otherwise known or stated prior.    Information on psychotropic medication can be found at:   National [place] of Mental Health: Information on Mental Health Medications      RISK MITIGATION, HARM REDUCTION & SAFETY NETTING:     Risk Mitigation Strategies, Harm Reduction Techniques, and Safety Netting are important interventions that can reduce acute and chronic risk.  As such, opportunities were sought to incorporate psychoeducation and practical advice pertaining to these topics into the encounter, to the degree possible, whenever feasible, appropriate and relevant.  Those interventions are supplemented here with written materials, detailing the topics in more depth.       RISK MITIGATION STRATEGIES:     Risk mitigation strategies are used to reduce the likelihood of future episodes of suicide, homicide, violence, and/or other problematic behaviors (e.g. self-injurious, risky, addictive, compulsive, impulsive). The following are examples of risk mitigation strategies which you can employ in order to reduce your overall burden of risk.     [x] Treatment of underlying psychopathology driving acute and chronic risk to the extent possible.  [x] Use of self administered rating scales and journaling to assist in risk tracking.  [x] Exploration of protective factors to potentially counterbalance risk.  [x] Identification and avoidance  of triggers and situations that increase risk, including excessive alcohol and drug use.  [x] Timely follow up and ongoing treatment of mental health issues moving forward.  [x] Full compliance with medication regimen.  [x] A good working knowledge of your medication regimen, including specific instructions on the administration of the medications.  [x] Consultation with an appropriate medical provider prior to altering or deviating from these instructions on your own.  [x] Active involvement and participation of family and natural support wherever feasible and possible.  [x] Development and review of coping strategies that can be immediately deployed in times of acute crisis.  [x] Implementation of home safety practices and the removal/reduction of access to lethal means (including, but not limited to, firearms, certain types and quantities of medication, poisons, or other methods you may have contemplated or identified).  [x] Collaborative development of a written safety plan with your treatment team and loved ones that can be immediately referred to in times of acute crisis.  [x] Utilization of a safety contract to engage your treatment team and further assess/manage risk.  [x] A good working knowledge of how to access emergency treatment in times of acute crisis.  [x] Utilization of suicide hotlines number (988) and resources in times of crisis.    If you require further information pertaining to the issues outlined above, please reach out to me or your other providers through the [facility] portal at [email or web address], or call [phone] or [phone] to discuss.  See resource list for additional material.      SAFETY NETTING:     In healthcare, safety netting refers to the provision of information to help patients or carers identify the need to consult a health care professional if a health concern arises or changes.  The relevance of this advice is most obvious with chronic mental illnesses, as their  dynamic nature, with symptoms and signs emerging at different times and in different combinations, makes safety netting particularly important.  Specific safety net advice for you includes the following:    [x] The existence of uncertainty. Mental health diagnoses and conditions contain at least some degree of uncertainty - knowing this, you should feel empowered to reconsult if necessary.  [x] What exactly to look out for. Given the recognised risk of possible deterioration or the development of complications, you should become familiar with the specific clinical features (including red flags) to look out for.    [x] How exactly to seek further help. You should know how and where to seek further help if needed.  Make a plan in advance and keep it handy.  It's also a good idea to share the plan with your treatment providers and loved ones.  [x] What to expect about time course. Mental health diagnoses and conditions often have an expected time course, which is important information for you to know.  However, if your difficulties do not conform to this time line and concerns arise, do not delay seeking further medical advice.    If you require further information pertaining to the issues outlined above, please reach out to me or your other providers through the [facility] portal at [email or web address], or call [phone] or [phone] to discuss.  See resource list for additional material.      HARM REDUCTION:     Harm Reduction techniques are used in an effort to reduce negative consequences associated with risky and maladaptive behaviors, until cessation of the problematic behaviors can be established.  Harm reduction is best thought of as a journey and not a destination; it is not an endorsement of problematic behavior, but an acknowledgement and recognition of the step-by-step nature of recovery.      Although commonly employed in working with people who suffer with drug addiction, harm reduction can be  more broadly applied to any problematic behavior.    Harm Reduction and Substance Abuse:  [x] Incorporates a spectrum of strategies that includes safer use, managed use, abstinence, meeting people who use drugs where theyre at, and addressing conditions of use along with the use itself.  [x] Accepts, for better or worse, that licit and illicit drug use is part of our world and chooses to work to minimize its harmful effects rather than simply ignore or condemn them.  [x] Understands drug use as a complex, multi-faceted phenomenon that encompasses a continuum of behaviors from severe use to total abstinence, and acknowledges that some ways of using drugs are clearly safer than others.  [x] Calls for the non-judgmental, non-coercive provision of services and resources to people who use drugs and the communities in which they live in order to assist them in reducing attendant harm.  [x] Affirms people who use drugs themselves as the primary agents of reducing the harms of their drug use and seeks to empower them to share information and support each other in strategies which meet their actual conditions of use.  [x] Does not attempt to minimize or ignore the real and tragic harm and danger that can be associated with illicit drug use.  [x] Meets people where they are, but seeks to not leave them there.  [x] Examples of specific interventions include, but are not limited to, narcan (naloxone), medication assisted treatment, syringe access, overdose prevention, and safer drug use techniques.    Key Harm Reduction Strategies: Opioid Use Disorder  [x] Safe Injection Sites & Equipment  [x] Managed Use  [x] Syringe Exchange Programs  [x] Fentanyl Test Strips  [x] Pharmacotherapy/Medication Assisted Treatment  [x] Narcan  [x] Good Jehovah's witness Laws  [x] Treatment Instead of residential  [x] Diversion Programs  [x] Overdose Education  [x] Abstinence    Whether or not you struggle with substance abuse, any and all opportunities to  "employ harm reduction techniques to address difficult to change problematic behaviors should be sought and implemented - whenever and wherever feasible, relevant and applicable. Additionally, harm reduction techniques can be applied broadly, and are relevant for a multitude of situations - even those that do not involve problematic or maladaptive behaviors.     EXAMPLES OF HARM REDUCTION IN OTHER AREAS  SUN SCREEN SEAT BELTS SPEED LIMITS BIRTH CONTROL        If you require further information pertaining to the issues outlined above, please reach out to me through the [facility] portal or call [phone] to discuss.  See resource list for additional material.      FIREARM SAFETY:     THE SIX BASIC GUN SAFETY RULES  There are six basic gun safety rules for gun owners to understand and practice at all times:  Treat all guns as if they are loaded. Always assume that a gun is loaded even if you think it is unloaded. Every time a gun is handled for any reason, check to see that it is unloaded. If you are unable to check a gun to see if it is unloaded, leave it alone and seek help from someone more knowledgeable about guns.  Keep the gun pointed in the safest possible direction. Always be aware of where a gun is pointing. A "safe direction" is one where an accidental discharge of the gun will not cause injury or damage. Only point a gun at an object you intend to shoot. Never point a gun toward yourself or another person.  Keep your finger off the trigger until you are ready to shoot. Always keep your finger off the trigger and outside the trigger guard until you are ready to shoot. Even though it may be comfortable to rest your finger on the trigger, it also is unsafe. If you are moving around with your finger on the trigger and stumble or fall, you could inadvertently pull the trigger. Sudden loud noises or movements can result in an accidental discharge because there is a natural tendency to tighten the muscles when " startled. The trigger is for firing and the handle is for handling.  Know your target, its surroundings and beyond. Check that the areas in front of and behind your target are safe before shooting. Be aware that if the bullet misses or completely passes through the target, it could strike a person or object. Identify the target and make sure it is what you intend to shoot. If you are in doubt, DON'T SHOOT! Never fire at a target that is only a movement, color, sound or unidentifiable shape. Be aware of all the people around you before you shoot.  Know how to properly operate your gun. It is important to become thoroughly familiar with your gun. You should know its mechanical characteristics including how to properly load, unload and clear a malfunction from your gun. Obviously, not all guns are mechanically the same. Never assume that what applies to one make or model is exactly applicable to another. You should direct questions regarding the operation of your gun to your firearms dealer, or contact the  directly.  Store your gun safely and securely to prevent unauthorized use. Guns and ammunition should be stored separately. When the gun is not in your hands, you must still think of safety. Use an approved firearms safety device on the gun, such as a trigger lock or cable lock, so it cannot be fired. Store it unloaded in a locked container, such as an approved lock box or a gun safe. Store your gun in a different location than the ammunition. For maximum safety you should use both a locking device and a storage container.    ADDITIONAL SAFETY POINTS  The six basic safety rules are the foundational rules for gun safety. However, there are additional safety points that must not be overlooked.  [x] Never handle a gun when you are in an emotional state such as anger or depression. Your judgment may be impaired. If you have acute or chronic suicidal ideation, a suicide plan, or suicidal intent, have firearms  "removed and your access restricted by a trusted loved one or other responsible individual or agency.  [x] Never shoot a gun in celebration (the Fourth of July or New Year's Donita, for example). Not only is this unsafe, but it is generally illegal. A bullet fired into the air will return to the ground with enough speed to cause injury or death.  [x] Do not shoot at water, flat or hard surfaces. The bullet can ricochet and hit someone or something other than the target.  [x] Hand your gun to someone only after you verify that it is unloaded and the cylinder or action is open. Take a gun from someone only after you verify that it is unloaded and the cylinder or action is open.  [x] Guns, alcohol and drugs don't mix. Alcohol and drugs can negatively affect judgment as well as physical coordination. Alcohol and any other substance likely to impair normal mental or physical functions should not be used before or while handling guns. Avoid handling and using your gun when you are taking medications that cause drowsiness or include a warning to not operate machinery while taking this drug.   [x] The loud noise from a fired gun can cause hearing damage, and the debris and hot gas that is often emitted can result in eye injury. Always wear ear and eye protection when shooting a gun.      GUNS AND CHILDREN - FIREARM OWNER RESPONSIBILITIES    You Cannot Be Too Careful with Children and Guns  [x] There is no such thing as being too careful with children and guns. Never assume that simply because a toddler may lack finger strength, they can't pull the trigger. A child's thumb has twice the strength of the other fingers. When a toddler's thumb "pushes" against a trigger, invariably the barrel of the gun is pointing directly at the child's face. NEVER leave a firearm lying around the house.  [x] Child safety precautions still apply even if you have no children or if your children have grown to adulthood and left home. A nephew, " "niece, neighbor's child or a grandchild may come to visit. Practice gun safety at all times.  [x] To prevent injury or death caused by improper storage of guns in a home where children are likely to be present, you should store all guns unloaded, lock them with a firearms safety device and store them in a locked container. Ammunition should be stored in a location separate from the gun.    Talking to Children About Guns  [x] Children are naturally curious about things they don't know about or think are "forbidden." When a child asks questions or begins to act out "gun play," you may want to address his or her curiosity by answering the questions as honestly and openly as possible. This will remove the mystery and reduce the natural curiosity. Also, it is important to remember to talk to children in a manner they can relate to and understand. This is very important, especially when teaching children about the difference between "real" and "make-believe." Let children know that, even though they may look the same, real guns are very different than toy guns. A real gun will hurt or kill someone who is shot.    Instill a Mind Set of Safety and Responsibility  [x] The American Academy of Pediatrics reports that adolescence is a highly vulnerable stage in life for teenagers struggling to develop traits of identity, independence and autonomy. Children, of course, are both naturally curious and innocently unaware of many dangers around them. Thus, adolescents as well as children may not be sufficiently safeguarded by cautionary words, however frequent. Contrary actions can completely undermine good advice. A "Do as I say and not as I do" approach to gun safety is both irresponsible and dangerous.  [x] Remember that actions speak louder than words. Children learn most by observing the adults around them. By practicing safe conduct you will also be teaching safe conduct.    Safety and Storage Devices  [x] If you decide to " keep a firearm in your home you must consider the issue of how to store the firearm in a safe and secure manner. There are a variety of safety and storage devices currently available to the public in a wide range of prices. Some devices are locking mechanisms designed to keep the firearm from being loaded or fired, but don't prevent the firearm from being handled or stolen. There are also locking storage containers that hold the firearm out of sight. For maximum safety you should use both a firearm safety device and a locking storage container to store your unloaded firearm.   Two of the most common locking mechanisms are trigger locks and cable locks. Trigger locks are typically two-piece devices that fit around the trigger and trigger guard to prevent access to the trigger. One side has a post that fits into a hole in the other side. They are locked by a key or combination locking mechanism. Cable locks typically work by looping a strong steel cable through the action of the firearm to block the firearm's operation and prevent accidental firing. However, neither trigger locks nor cable locks are designed to prevent access to the firearm.   [x] Smaller lock boxes and larger gun safes are two of the most common types of locking storage containers. One advantage of lock boxes and gun safes is that they are designed to completely prevent unintended handling and removal of a firearm. Lock boxes are generally constructed of sturdy, high-grade metal opened by either a key or combination lock. Gun safes are quite heavy, usually weighing at least 50 pounds. While gun safes are typically the most expensive firearm storage devices, they are generally more reliable and secure.     Remember: Safety and storage devices are only as secure as the precautions you take to protect the key or combination to the lock.    RULES FOR KIDS  Adults should be aware that a child could discover a gun when a parent or another adult is not  present. This could happen in the child's own home; the home of a neighbor, friend or relative; or in a public place such as a school or park. If this should happen, a child should know the following rules and be taught to practice them.   Stop  The first rule for a child to follow if he/she finds or sees a gun is to stop what he/she is doing.  Don't Touch!  The second rule is for a child not to touch a gun he/she finds or sees. A child may think the best thing to do if he/she finds a gun is to pick it up and take it to an adult. A child needs to know he/she should NEVER touch a gun he/she may find or see.  Leave the Area  The third rule is to immediately leave the area. This would include never taking a gun away from another child or trying to stop someone from using gun.  Tell an Adult  The last rule is for a child to tell an adult about the gun he/she has seen. This includes times when other kids are playing with or shooting a gun.     METHODS OF CHILDPROOFING YOUR FIREARM  As a responsible handgun owner, you must recognize the need and be aware of the methods of childproofing your handgun, whether or not you have children.  Whenever children could be around, whether your own, or a friend's, relative's or neighbor's, additional safety steps should be taken when storing firearms and ammunition in your home.  [x] Always store your firearm unloaded.  [x] Use a firearms safety device AND store the firearm in a locked container.  [x] Store the ammunition separately in a locked container.  Always storing your firearm securely is the best method of childproofing your firearm; however, your choice of a storage place can add another element of safety. Carefully choose the storage place in your home especially if children may be around.  [x] Do not store your firearm where it is visible.  [x] Do not store your firearm in a bedside table, under your mattress or pillow, or on a closet shelf.  [x] Do not store your firearm  among your valuables (such as jewelry or cameras) unless it is locked in a secure container.  [x] Consider storing firearms not possessed for self-defense in a safe and secure manner away from the home.    [employer] for Gun Safety:  https://www.everytown.org       Gun Violence: Prediction, Prevention and Policy  American Psychological Association Panel of Experts Report  https://www.apa.org/pubs/reports/gun-violence-report.pdf       If you require further information pertaining to any of the issues outlined above, please reach out to me or your other providers through the [facility] portal at [email or web address], or call [phone] or [phone] to discuss.  See resource list for additional material.      IN CASE OF SUICIDAL THINKING, call the National Suicide Hotline Number: 988    988 Suicide & Crisis Lifeline: 988 , 5-594-588-TALK (8294)  Provides 24/7, free and confidential support for people in distress, prevention and crisis resources for you or your loved ones, and best practices for professionals.    Call, text or chat.  https://988Fair[employer].org

## 2022-09-15 NOTE — PROGRESS NOTES
Group Psychotherapy (PhD/LCSW)    Site: [facility]    Clinical status of patient: Intensive Outpatient Program (IOP)    Date: 9/15/2022    Group Focus: Emotion Regulation    Length of service: 48661 - 45-50 minutes    Number of patients in attendance: 10    Referred by: [facility]    Target symptoms: Substance Abuse, Depression and Anxiety    Patient's response to treatment: Active Listening and Self-disclosure    Progress toward goals: Progressing adequately    Interval History: Session focus was Emotion Regulation:  Opposite Action.  Patients identified action urges for each emotion and learned how to engage in opposite action when the emotions are not justified or unhelpful.    Diagnosis:     ICD-10-CM ICD-9-CM   1. Alcohol use disorder, severe, dependence  F10.20 303.90   2. Alcoholic cirrhosis, unspecified whether ascites present  K70.30 571.2   3. Chronic left hip pain  M25.552 719.45    G89.29 338.29       Plan: Continue treatment on ORP

## 2022-09-15 NOTE — PLAN OF CARE
09/15/22 1400   Activity/Group Therapy Checklist   Group Addiction Education   Attendance Attended   Follows Direction Followed directions   Group Interactions/Observations Interacted appropriately   Affect/Mood Range Normal range   Affect/Mood Display Appropriate   Goal Progression Progressing

## 2022-09-15 NOTE — PROGRESS NOTES
"Group Psychotherapy (PhD/LCSW)    Site: [facility]    Clinical status of patient: Intensive Outpatient Program (IOP)    Date: 9/15/2022    Group Focus: Psychodynamic Group Psychotherapy    Length of service: 11789 - 45-50 minutes    Number of patients in attendance: 5    Referred by: [facility] Treatment Team    Target symptoms: Alcohol Abuse    Patient's response to treatment: Active Listening and Self-disclosure    Progress toward goals: Progressing adequately    Interval History: Pt reports continued sobriety. He discussed some fears around his alcoholism "ruining" his marriage, and received feedback well about this from group.     Diagnosis: Alcohol Use Disorder    Plan: Continue treatment on ABU        "

## 2022-09-15 NOTE — PROGRESS NOTES
[facility]  DEPARTMENT OF PSYCHIATRY    SUBSEQUENT VISIT  [facility]    EXAMINING PRACTITIONER: Thaddeus Odonnell MD  BOARD CERTIFICATION: Psychiatry & Addiction Medicine      KEY:     I[]I Y = II[x][]II = Yes / Present / Present Though Denies / Endorses  I[]I N = II[][x]II = No / Absent / Absent Though Endorses / Denies  I[]I U = II[][]II = Unknown / Unable to Assess/Enact / Unwilling to Participate/Provide  I[]I A = II[x][x]II = Ambiguity / Uncertainty of Accuracy Exists  I[]I D = Denial or Minimization is Suspected/Evident  I[]I N/A = Non-Applicable    CHIEF COMPLAINT:     Patient Name: Irvin Diaz Jr.  YOB: 1975  MRN: [id number]    Irvin Diaz Jr. is a 47 y.o. male who is being seen by me for a follow up visit.  Irvin Diaz Jr. presents with the chief complaint of: problematic substance use/abuse and alcohol and/or drug addiction    CHART REVIEW:     Available documentation has been reviewed, and pertinent elements of the chart - including previous psychiatric evaluations - have been incorporated into this evaluation where appropriate.      The patient's last encounter with me was on: Visit date not found  The patient's last encounter in the psychiatry department was on: Visit date not found    PRESENTATION:     HPI, PSYCHIATRIC ROS & APPLICABLE MEDICAL ROS:   .    He has yet to start the acamprosate but will do so.  He denies cravings.  This will be first weekend in program - we discussed action plan if he does feel triggered to drink.  Relapse prevention provided.  He is attending 12 step meetings online.  He will avoid people he drank with in the past - will spend time with family.                 .  CURRENT PSYCHOTROPIC REGIMEN:     Acamprosate - has yet to begin      HISTORY:     PFSH: The patient's past psychiatric, family, social and medical history have been reviewed and updated as appropriate within the electronic medical record system.         "    The electronic chart was reviewed and updated as appropriate.  See Medcard for details.             RISK:     III[x]III  RISK PARAMETERS:     The following risk parameters were assessed during this evaluation:    I[]I Y  I[x]I N  I[]I U  I[]I A  Suicidal Ideation/Behavior: **   I[]I Y  I[x]I N  I[]I U  I[]I A  Homicidal Ideation/Behavior: **  I[]I Y  I[x]I N  I[]I U  I[]I A  Violence: **  I[]I Y  I[x]I N  I[]I U  I[]I A  Self-Injurious Behavior: **    I[]I Y  I[x]I N  I[]I U  I[]I A  I[]I N/A  Minimization of Symptoms Suspected/Evident: **  I[]I Y  I[x]I N  I[]I U  I[]I A  I[]I N/A  Exaggeration of Symptoms Suspected/Evident: **      III[x]III  CLINICAL RISK DETERMINATION:     Current risk is judged to be:  I[x]I Low    I[]I Moderate   I[]I High    The following criteria were met for involuntary psychiatric admission:   I[x]I None    I[]I Dangerous to Self    I[]I Dangerous to Others    I[]I Gravely Disabled      EXAMINATION:     VITALS:     There were no vitals filed for this visit.    MENTAL STATUS EXAMINATION:     General Appearance & Behavior: ** adequately groomed, appropriately dressed, in no apparent distress, under good behavioral control  Involuntary Movements and Motor Activity: *can ambulate but utilizing wheel rustam* no abnormal involuntary movements noted, no psychomotor agitation or retardation  Speech & Language: ** conversational, spontaneous, speaks and understands English proficiently  Mood: "fine"  Affect: *low-key* reactive, mood-congruent, appropriate to situation and context  Thought Process & Associations: ** linear and goal-directed, with no loosening of associations  Thought Content & Perceptions: ** no suicidal or homicidal ideation, no auditory or visual hallucinations, no paranoid ideation, no ideas of reference, no evidence of delusions or psychosis  Sensorium: ** alert with clear sensorium  Memory, Attention and Fund of Knowledge: ** grossly intact, no significant cognitive deficits " are noted  Insight: ** intact, demonstrates awareness of illness  Judgment: ** intact, behavior is adequate/appropriate given the circumstances       ASSESSMENT:     III[x]III  DIAGNOSES AND PROBLEMS:             .  Alcohol Use Disorder  Alcoholic Cirrhosis            .      PLAN:     IMPRESSION AND RECOMMENDATIONS:     MANAGEMENT PLAN, TREATMENT GOALS, THERAPEUTIC TECHNIQUES/APPROACHES & CLINICAL REASONING:     Begin Acamprosate.  Disposition:    - Continue ORP.  - Continue ORP protocol.  - Additional workup is planned to address substance use disorder, in order to guide and refine ongoing management options, including serial alcohol and drug laboratory testing (e.g. POCT breath alcohol test, urine toxicology, alcohol biomarkers, PETH).  - Full engagement in 12 step (or equivalent) recovery program(s), including mandatory meeting attendance and acquisition of sponsor.  - Patient counseled on abstinence from alcohol and substances of abuse (illicit and prescription).  - Relapse prevention and motivational interviewing provided.  - MAT for alcohol use disorder was discussed including acamprosate, naltrexone and disulfiram.    III[x]III  PRESCRIPTION DRUG MANAGEMENT:     Prescription Drug Management entails the review, recommendation, or consideration without recommendation of medications, and as such was employed during the encounter.    Discussed, to the extent possible, diagnosis, risks and benefits of proposed treatment vs alternative treatments vs no treatment, potential side effects of these treatments and the inherent unpredictability of treatment. The patient's ability to understand, participate and engage in a conversation surrounding this was deemed to be: adequate. The patient expresses understanding of the above and displays the capacity to agree with this treatment given said understanding. Patient also agrees that, currently, the benefits outweigh the risks and would like to continue treatment at this  time.     Written material has additionally been provided, via the AVS or other pre-printed handouts.                MEDICAL DECISION MAKING:     Shared medical decision making and informed consent are the hallmark and bedrock of good clinical care, and as such have been employed and obtained, respectively, to the degree possible.  Written material has been provided to supplement, augment, and reinforce any discussions and interventions, via the AVS or other pre-printed handouts.    Additional psychoeducation has been provided, as warranted.      LEVEL OF MEDICAL DECISION MAKING AND TIME:     Complexity (level) of medical decision making employed in the encounter: MODERATE    The total time for services performed on the date of the encounter: 15 minutes    Thaddeus Odonnell MD  Department of Psychiatry  [facility]    DATA:     DIAGNOSTIC TESTING:     The chart was reviewed for recent diagnostic procedures and investigations, and pertinent results are noted below.      ADDICTION LABORATORY RESULTS:     Urine Toxicology:  Benzodiazepines (no units)   Date Value   09/14/2022 Negative     Barbiturate Screen, Ur (no units)   Date Value   09/14/2022 Negative     Opiate Scrn, Ur (no units)   Date Value   09/14/2022 Negative     Methadone metabolites (no units)   Date Value   09/14/2022 Negative     BUPRENORPHINE (no units)   Date Value   08/02/2022 Not Detected     FENTANYL (no units)   Date Value   08/02/2022 Not Detected     OXYCODONE (no units)   Date Value   08/02/2022 Not Detected     Cocaine (Metab.) (no units)   Date Value   09/14/2022 Negative     Amphetamine Screen, Ur (no units)   Date Value   09/14/2022 Negative     THC (no units)   Date Value   09/14/2022 Negative     Phencyclidine (no units)   Date Value   09/14/2022 Negative       Alcohol:  PEth 16:0/18.1 (POPEth) (ng/mL)   Date Value   08/15/2022 <10   07/18/2022 135   06/30/2022 663     Alcohol, Serum (mg/dL)   Date Value   07/18/2022 <10     Alcohol,  Urine (mg/dL)   Date Value   09/14/2022 <10     Ethyl Glucuronide (ng/mL)   Date Value   09/12/2022 Presumptive Positive (A)     Lab Results   Component Value Date     (H) 08/15/2022     (H) 08/15/2022    AST 42 (H) 09/14/2022    ALT 25 09/14/2022    GGT 81 (H) 07/21/2022    AMMONIA 89 (H) 08/15/2022       Miscellaneous:  No results found for: ALCOHOL, LABBENZ, BARBITURATE, LABMETH, AMPHETAMINE, THCMARIJUANA, LABPHEN, BUPRENORPH, NORBUPRENOR, NICOTINESERU, COTININESERU

## 2022-09-16 ENCOUNTER — HOSPITAL ENCOUNTER (OUTPATIENT)
Dept: PSYCHIATRY | Facility: HOSPITAL | Age: 47
Discharge: HOME | End: 2022-09-16
Attending: PSYCHIATRY & NEUROLOGY
Payer: MEDICARE

## 2022-09-16 VITALS
TEMPERATURE: 98 F | RESPIRATION RATE: 20 BRPM | DIASTOLIC BLOOD PRESSURE: 79 MMHG | HEART RATE: 72 BPM | SYSTOLIC BLOOD PRESSURE: 144 MMHG

## 2022-09-16 DIAGNOSIS — Z79.899 LONG-TERM USE OF HIGH-RISK MEDICATION: Primary | ICD-10-CM

## 2022-09-16 DIAGNOSIS — Z79.899 LONG-TERM USE OF HIGH-RISK MEDICATION: ICD-10-CM

## 2022-09-16 LAB
AMPHET+METHAMPHET UR QL: NEGATIVE
BARBITURATES UR QL SCN>200 NG/ML: NEGATIVE
BENZODIAZ UR QL SCN>200 NG/ML: NEGATIVE
BZE UR QL SCN: NEGATIVE
CANNABINOIDS UR QL SCN: NEGATIVE
CREAT UR-MCNC: 167 MG/DL (ref 23–375)
ETHANOL UR-MCNC: <10 MG/DL
ETHYL GLUC/SULFATE INTERPRETATION: NORMAL
ETHYL GLUCURONIDE UR CFM-MCNC: NORMAL NG/ML
ETHYL SULFATE UR CFM-MCNC: NORMAL NG/ML
METHADONE UR QL SCN>300 NG/ML: NEGATIVE
OPIATES UR QL SCN: NEGATIVE
PCP UR QL SCN>25 NG/ML: NEGATIVE
PETH 16:0/18.1 (POPETH): 684 NG/ML
PETH 16:0/18.2 (PLPETH): 907 NG/ML
TOXICOLOGY INFORMATION: NORMAL

## 2022-09-16 PROCEDURE — 90853 GROUP PSYCHOTHERAPY: CPT

## 2022-09-16 PROCEDURE — 80307 DRUG TEST PRSMV CHEM ANLYZR: CPT | Performed by: PSYCHIATRY & NEUROLOGY

## 2022-09-16 PROCEDURE — 90853 GROUP PSYCHOTHERAPY: CPT | Performed by: SOCIAL WORKER

## 2022-09-16 NOTE — PSYCH
[facility] ([facility])  Psychosocial Assessment    Date: 9/16/2022  Time: 10:06 AM    Name: Irvin Diaz       Chief Complaint: addictive disorder     History of Present Illness: 47 year old male patient admitted to the [facility] with chief complaint of alcohol use disorder, severe, dependence.  Referred by hepatology.  He was drinking up to a pint of gin daily.  Diagnosed with alcoholic cirrhosis.  Last drank two weeks prior to entering [facility].  He reports intermittent cannabis use.  Prescribed prn opiate pain medication for worsening hip pain.           Consequences of use: Family negatively impacting relationships and Other health issues-alcoholic cirrhosis     Biomedical complications related to use: patient verbalizes understanding     Motivation for change:  yes    Medical History:   Past Medical History:   Diagnosis Date    Alcoholic cirrhosis of liver     Arthritis     ATN (acute tubular necrosis)     CHF (congestive heart failure)     Diverticulitis 01/2020    Esophageal varices     GERD (gastroesophageal reflux disease)     GI bleed     Hip arthritis     Left    Hypertension     Liver cirrhosis     Macrocytic anemia     Pulmonary embolism 08/2018    Unprovoked DVT.  Stop Coumadin due to GIB    Thrombocytopenia        Past Surgical History:   Procedure Laterality Date    ANKLE SURGERY      BLOCK, NERVE, PERIPHERAL Left 06/24/2022    Procedure: Left Hip femoral-obturator accessory nerve block;  Surgeon: Robbin De La Garza DO;  Location: [facility] OR;  Service: Pain Management;  Laterality: Left;    COLON SURGERY  2007    COLONOSCOPY  09/05/2019    [facility]    COLONOSCOPY N/A 02/17/2021    Procedure: COLONOSCOPY;  Surgeon: Renea Billingsley MD;  Location: [facility];  Service: Endoscopy;  Laterality: N/A;    COLONOSCOPY W/ BIOPSIES  02/17/2021    ESOPHAGOGASTRODUODENOSCOPY N/A 07/26/2019    Procedure: EGD (ESOPHAGOGASTRODUODENOSCOPY);  Surgeon: Brian Trivedi MD;  Location: [facility];   Service: Endoscopy;  Laterality: N/A;    ESOPHAGOGASTRODUODENOSCOPY N/A 2020    Procedure: EGD (ESOPHAGOGASTRODUODENOSCOPY);  Surgeon: Donn Acuna MD;  Location: [facility];  Service: Endoscopy;  Laterality: N/A;    ESOPHAGOGASTRODUODENOSCOPY N/A 2021    Procedure: EGD (ESOPHAGOGASTRODUODENOSCOPY)- coffee ground emesis, hx varices;  Surgeon: Steve Chairez MD;  Location: [facility];  Service: Endoscopy;  Laterality: N/A;    ESOPHAGOGASTRODUODENOSCOPY N/A 2021    Procedure: EGD (ESOPHAGOGASTRODUODENOSCOPY);  Surgeon: Jeanmarie Ngo MD;  Location: [facility];  Service: Endoscopy;  Laterality: N/A;    ESOPHAGOGASTRODUODENOSCOPY N/A 2021    Procedure: ESOPHAGOGASTRODUODENOSCOPY (EGD);  Surgeon: Claudio Medeiros MD;  Location: [facility];  Service: Endoscopy;  Laterality: N/A;    ESOPHAGOGASTRODUODENOSCOPY  2021    ESOPHAGOGASTRODUODENOSCOPY N/A 2021    Procedure: EGD (ESOPHAGOGASTRODUODENOSCOPY);  Surgeon: Ajay Jackson MD;  Location: [facility];  Service: Endoscopy;  Laterality: N/A;    ESOPHAGOGASTRODUODENOSCOPY N/A 2022    Procedure: EGD (ESOPHAGOGASTRODUODENOSCOPY);  Surgeon: Brian Trivedi MD;  Location: [facility];  Service: Endoscopy;  Laterality: N/A;    ESOPHAGOGASTRODUODENOSCOPY N/A 2022    Procedure: EGD (ESOPHAGOGASTRODUODENOSCOPY);  Surgeon: Ajay Jackson MD;  Location: [facility];  Service: Endoscopy;  Laterality: N/A;    HERNIA REPAIR Left     Inguinal    UPPER GASTROINTESTINAL ENDOSCOPY N/A 2022       Family History   Problem Relation Age of Onset    Hypertension Mother     Breast cancer Mother     Hypertension Father     Prostate cancer Father     Bladder Cancer Father        Social History     Socioeconomic History    Marital status: Legally    Tobacco Use    Smoking status: Former     Types: Cigarettes     Quit date: 2000     Years since quittin.7    Smokeless tobacco: Never    Tobacco comments:     quit 20 years  ago   Substance and Sexual Activity    Alcohol use: Not Currently     Comment: mary    Drug use: Yes     Types: Marijuana     Comment: occasionally    Sexual activity: Yes     Comment: occ     Social Determinants of Health     Financial Resource Strain: Low Risk     Difficulty of Paying Living Expenses: Not very hard   Food Insecurity: No Food Insecurity    Worried About Running Out of Food in the Last Year: Never true    Ran Out of Food in the Last Year: Never true   Transportation Needs: No Transportation Needs    Lack of Transportation (Medical): No    Lack of Transportation (Non-Medical): No   Physical Activity: Inactive    Days of Exercise per Week: 0 days    Minutes of Exercise per Session: 0 min   Stress: No Stress Concern Present    Feeling of Stress : Not at all   Social Connections: Socially Isolated    Frequency of Communication with Friends and Family: Three times a week    Frequency of Social Gatherings with Friends and Family: Three times a week    Attends Islam Services: Never    Active Member of Clubs or Organizations: No    Attends Club or Organization Meetings: Never    Marital Status:    Housing Stability: Low Risk     Unable to Pay for Housing in the Last Year: No    Number of Places Lived in the Last Year: 1    Unstable Housing in the Last Year: No       Current Outpatient Medications   Medication Sig Dispense Refill    acamprosate (CAMPRAL) 333 mg tablet Take 1 tablet (333 mg total) by mouth 3 (three) times daily. 90 tablet 11    carvediloL (COREG) 12.5 MG tablet Take 1 tablet (12.5 mg total) by mouth 2 (two) times daily with meals. 60 tablet 11    cholestyramine (QUESTRAN) 4 gram packet Take 1 packet (4 g total) by mouth 2 (two) times daily. 180 packet 3    ergocalciferol (ERGOCALCIFEROL) 50,000 unit Cap 1 tablet      famotidine (PEPCID) 20 MG tablet 1 tablet at bedtime as needed      ferrous gluconate (FERGON) 240 (27 FE) MG tablet Take 2 tablets (480 mg total) by mouth 2 (two)  times daily with meals. 120 tablet 3    folic acid (FOLVITE) 1 MG tablet Take 1 tablet (1 mg total) by mouth once daily. 30 tablet 5    gabapentin (NEURONTIN) 300 MG capsule Take 1 capsule (300 mg total) by mouth every evening for 3 days, THEN 1 capsule (300 mg total) 2 (two) times daily for 3 days, THEN 1 capsule (300 mg total) 3 (three) times daily for 24 days. (Patient taking differently: Take 1 capsule by mouth three times daily) 81 capsule 0    methylPREDNISolone (MEDROL DOSEPACK) 4 mg tablet use as directed 21 each 0    nortriptyline (PAMELOR) 25 MG capsule Take 1 capsule (25 mg total) by mouth every evening. 30 capsule 3    ondansetron (ZOFRAN-ODT) 4 MG TbDL 1 tablet on the tongue and allow to dissolve      oxyCODONE (ROXICODONE) 5 MG immediate release tablet Take 1 tablet (5 mg total) by mouth 2 (two) times daily as needed for Pain. 60 tablet 0    [START ON 10/9/2022] oxyCODONE (ROXICODONE) 5 MG immediate release tablet Take 1 tablet (5 mg total) by mouth 2 (two) times daily as needed for Pain. 60 tablet 0    pantoprazole (PROTONIX) 40 MG tablet Take 1 tablet (40 mg total) by mouth once daily. (Patient taking differently: Take 40 mg by mouth 2 (two) times daily.) 30 tablet 11    potassium chloride (K-TAB) 20 mEq 1 tablet with food      sildenafiL (VIAGRA) 100 MG tablet Take 1 tablet (100 mg total) by mouth daily as needed for Erectile Dysfunction. 30 tablet 5    sucralfate (CARAFATE) 1 gram tablet Take 1 g by mouth 4 (four) times daily.      thiamine 100 MG tablet Take 1 tablet (100 mg total) by mouth once daily. 30 tablet 5    triamcinolone acetonide 0.1% (KENALOG) 0.1 % ointment APPLY TOPICALLY TO THE AFFECTED AREA TWICE DAILY. DO NOT APPLY TO FACE OR ANOGENITAL REGION. 30 g 1     No current facility-administered medications for this encounter.       Review of patient's allergies indicates:   Allergen Reactions    Ciprofloxacin hcl Hallucinations    Meperidine Hives     Pt medicated w/multiple medications  (demerol, protonix, and zofran)  w/i 10 mins time frame prior to developing localized hives near IV site that med was administered. Pt reports he has/takes all other medications on daily basis.     Morphine Itching    Nsaids (non-steroidal anti-inflammatory drug)      Kidney Disease    Oxycodone      Weird, lucid dreams    Tylenol [acetaminophen]      Hx of liver disease       Family History: (mental illness, substance abuse)  Raised by parents.  Two sisters, one brother.  Denies family hx of mental illness/substance abuse.  Currently  from spouse.  Twice .  Three sons, three daughters.        Psychiatric History/Previous Substance Abuse TX (incluse response to past substance abuse tx):  Past Psychiatric History:   None   Currently in treatment with: no current outpatient providers       Jewish/Spiritual Orientation: Druze     Sexual/Physical Abuse (indicate if patient is abuser or the abused): patient denies     Sexual Orientation and History: heterosexual      History:  no    Legal Issues: none     Financial Status: patient is currently unemployed     Social, Peer Group, Living Situation, and Living Environment: Living with parents, sister, and niece.  Currently,  from spouse.    Education Level: high school graduate     Stressors:Health Problems, Marital or Family Conflict, and Substance Abuse    Substance Use History: (include age of onset, duration, intensity, patterns of use, relapse history, and consequences of use for each substance):  Alcohol-Drinking up to a pint of gin daily.     Any Other Addictive Behaviors: none     Diagnoses:    Alcohol Use Disorder, severe, dependence  Alcoholic cirrhosis, unspecified whether ascites present          As Evidenced by: Tolerance, Withdrawal, The substance is often taken in larger amounts or over a longer period of time than was intended., Persistent desire to cut down or control use., A great deal of time was spent in obtaining,  using or recovering from the effects of a substance., and Important social, occupational, or recreational activities were reduced or discontinued due to use.

## 2022-09-16 NOTE — PLAN OF CARE
09/16/22 [id number]   Activity/Group Therapy Checklist   Group Other (Comments)  (Self Care)   Attendance Attended   Follows Direction Followed directions   Group Interactions/Observations Interacted appropriately;Alert;Supportive;Sharing   Affect/Mood Range Normal range   Affect/Mood Display Appropriate   Goal Progression Progressing      and SW Intern facilitated self care group with patients. SW and Intern discussed Nurturing and depleting exercises and had patients to identify activities of daily living and score each to determine balance in their schedules and to help identify areas that could be worked on.

## 2022-09-16 NOTE — PROGRESS NOTES
Group Psychotherapy with Psychology Intern    [facility]    Clinical status of patient: Intensive Outpatient Program (IOP)    Date: 9/16/2022    Group Focus: Psychodynamic Group Psychotherapy    Length of service: 45-50 minutes    Number of patients in attendance: 5    Referred by: Addictive Behavior Unit Treatment Team    Target symptoms: Alcohol Abuse    Patient's response to treatment: Active Listening and Self-disclosure, Provided feedback to other group members    Progress toward goals: Progressing adequately    Interval History: Pt reports continued sobriety. He discussed his plans for the weekend and expressed a need for rest.    Diagnosis: Alcohol Use Disorder    Plan: Continue treatment on ABU

## 2022-09-16 NOTE — TREATMENT PLAN
[facility] ([facility])  ADDICTIVE BEHAVIOR UNIT  INTERDISCIPLINARY TREATMENT PLAN      INTERDISCIPLINARY  TREATMENT TEAM:    Thaddeus Odonnell M.D., Psychiatrist      Ciarra Kulkarni, Ph.D., Clinical Psychologist    Ajay Spencer L.P.N., Nurse    Teja Feldman, [facility],     Torie Presley [facility],       Resident: Arthur Otto MD    (Signatures scanned into record separately).      ESTIMATED LOS:  4-6 weeks    The patient has reviewed the treatment plan with staff and has signed the Patient Responsibilities form.  Patient signature scanned into record separately    Dr. Thaddeus Odonnell certifies that the patient would require inpatient psychiatric care if the Partial Hospitalization services were not provided, and services will be furnished under the care of a physician, and under a written Plan of Treatment.    Thaddeus Odonnell M.D., Psychiatrist - Signature scanned into record separately.      TREATMENT PLAN    DIAGNOSIS:   Alcohol used disorder, severe, with dependence      Patient/Family Education Needs/Barriers to Learning (i.e., Language, Reading, Comprehension): None    Support/Advocacy Services/Needs (i.e., Financial, Transportation, Medications): None    Community Resources (i.e., Alcoholics Anonymous, Al Anon, Cocaine Anonymous, Narcotics Anonymous): AA resources provided             Patient Goals:   Sobriety   2. Better relationship with sons  3. Liver transplant   4. Repair marriage         Current Coping Skills:  Reading   2. Spend time with family   3. Walked dog   4. Just sat on patio       Patient Identified Strengths:  I love reading   2. I try to never    3. Always willing to listen to my kids  4. Try my best to always be honest with others       Patient Identified Limitations:  I  2. Have   3. None   4. Other than myself         Goals and Objectives:  1. Goal:  Abstain from alcohol and illicit drugs   Objective measure: Negative breathalyzer,  negative urine screens   Time frame to reach goal: By discharge    2  Goal: Attend daily 12-step meetings   Objective measure: Signed attendance sheet daily   Time frame to reach goal: Each day    3. Goal: Participate in group sessions    Objective measure: Progress notes indicating active listening,    self-disclosure, feedback   Time frame to reach goal: Each day    4. Goal: Obtain a 12-step sponsor   Objective measure: self-report   Time frame to reach goal: By discharge    5. Goal: Complete Life Story   Objective measure: Share story with group   Time frame to reach goal: Within first two weeks of treatment    6. Goal: Complete First    Objective measure: Share 1st step with group   Time frame to reach goal:  By discharge    7. Goal: Complete Relapse Prevention Plan   Objective measure: Share plan with group   Time frame to reach goal: By discharge    8.  Goal: Family involvement/participation   Objective measure: Family session documented in progress notes   Time frame to reach goal: By discharge    9. Goal:  Reduce depression   Objective measure: Physician progress note indicating depression is improved   Time frame to reach goal: By discharge    10.  Goal: Reduce anxiety   Objective measure: Physician progress note indicating anxiety is improved   Time frame to reach goal: By discharge        Group Interventions:    Psychodynamic Group Psychotherapy  1 hour, five times per week  Goals: 1. Utilize group empathy and support for problem solving; 2. Apply stress management, communication, and assertive skills to personal issues; 3. Discuss negative consequences of addictive behavior; 4. Discuss ways to change lifestyle to support sobriety; 5. Discuss addiction history    Addiction Education Group  1 hour, 4 times per week  Goals:  1. Verbalize increased knowledge of the process of recovery; 2. Understand basic concepts of addiction (denial, powerlessness, unmanageabiltiy, etc.); 3. Develop a consistent, positive  image of self; 4. Identify personal values that may aid in recovery    Steps to Recovery Group  1 hour, 5 times per week  Goals:  1. Learn 12 steps; 2. Identify ways to incorporate 12 step principles into daily life; 3. Complete first step; 4. Verbalize knowledge and understanding of the concept of a higher power    Relapse Prevention Group  1 hour, 2 times per week  Goals: 1. Verbalize increased knowledge of chemical dependence and the process of recovery; 2. Verbally identify specific behaviors, attitudes, and feelings that may lead to relapse, focusing on triggers; 3. Identify behavior patterns that will need to be changed to maintain sobriety, including people and places that must be avoided; 4. Identify specific strategies to address relevant identified relapse triggers; 5. Identify positive rewards associated with abstinence    Living Sober Group  1 hour, 2 times per week  Goals:  1. Reflect upon events of day/weekend, focusing on positive change; 2.  Discuss dynamics of 12 step meetings attended; 3. Discuss topics from book Living Sober     Stress Management Skills Group  1 hour, 3 times per week  Goals: 1. Identify types and levels of stress; 2. Identify and change maladaptive beliefs and behaviors; 3. Identify and practice techniques of stress management    Disease Concept Group  1 hour, 1 time per week  Goals: 1. Verbalize an understanding of the disease concept of addiction; 2. Increase familys understanding of the disease concept of addiction    Communication Skills Group  1 hour, 2 times per week  Goals: 1. Learn rules of effective communication; 2. Improve listening skills; 3. Practice clear communication    Promoting Healthy Lifestyles Group  1 hour, 1 time per week  Goals:  1. Understand the biopsychosocial model of health; 2. Develop insight into how substance abuse/dependency can impact dimensions of health; 3. Develop appropriate health promotion strategies    Relationship Dynamics Group  1 hour,  1 time per week  Goals:  1. Learn about factors that shape relationships; 2. Understand the central role of relationships in personal well-being; 3. Learn how to improve all relationships    Medical Complications Group  1 hour, 1 time per week  Goals:  1.  Increase knowledge of how addiction negatively affects the body; 2. Increase awareness of how abstinence can positively impact health     Mental Hygiene Group  1 hour, 1 time per week  Goals:  1. Increase healthy habits of relaxation, exercise, and nutrition; 2. Discuss strategies for coping with depression and anxiety    Daily Reflections Group  ½ hour, 5 times per week  Goals:  1. Independently journal events and emotions; 2. Independently consider positive concepts of recovery on a daily basis    Check-In/Weekend Process  1 hour, 1 time per week  Goals:  1. Identification of situations/needs that may have arisen over the weekend.  2. Identification of goals for the week.  3. Emotional, physical, spiritual, and social check-in to begin group.

## 2022-09-16 NOTE — PROGRESS NOTES
[facility]  DEPARTMENT OF PSYCHIATRY    SUBSEQUENT VISIT  [facility]    KEY:     I[]I Y = II[x][]II = Yes / Present / Present Though Denies / Endorses  I[]I N = II[][x]II = No / Absent / Absent Though Endorses / Denies  I[]I U = II[][]II = Unknown / Unable to Assess/Enact / Unwilling to Participate/Provide  I[]I A = II[x][x]II = Ambiguity / Uncertainty of Accuracy Exists  I[]I D = Denial or Minimization is Suspected/Evident  I[]I N/A = Non-Applicable    CHIEF COMPLAINT:     Patient Name: Irvin Diaz Jr.  YOB: 1975  MRN: [id number]    Irvin Diaz Jr. is a 47 y.o. male who is being seen by me for a follow up visit.  Irvin Diaz Jr. presents with the chief complaint of: problematic substance use/abuse and alcohol and/or drug addiction    CHART REVIEW:     Available documentation has been reviewed, and pertinent elements of the chart - including previous psychiatric evaluations - have been incorporated into this evaluation where appropriate.    PRESENTATION:     HPI, PSYCHIATRIC ROS & APPLICABLE MEDICAL ROS:   .  Pt doing well this AM. Pt states that their mood is good. Pt started acamprosate and tolerating it well. Denies any ADEs to medication. Pt has been eating well and maintaining proper hygiene. Sleep has been off and on though overall is improving. Pt has no positive medical or psychiatric symptomology on ROS. Pt denies any further physical or other complaints. Disucssed and pt will attend 2x meetings this weekend.             .  CURRENT PSYCHOTROPIC REGIMEN:     Acamprosate - 33mg TID    HISTORY:     PFSH: The patient's past psychiatric, family, social and medical history have been reviewed and updated as appropriate within the electronic medical record system.            The electronic chart was reviewed and updated as appropriate.  See Medcard for details.             RISK:     III[x]III  RISK PARAMETERS:     The following risk parameters were assessed  "during this evaluation:    I[]I Y  I[x]I N  I[]I U  I[]I A  Suicidal Ideation/Behavior: **   I[]I Y  I[x]I N  I[]I U  I[]I A  Homicidal Ideation/Behavior: **  I[]I Y  I[x]I N  I[]I U  I[]I A  Violence: **  I[]I Y  I[x]I N  I[]I U  I[]I A  Self-Injurious Behavior: **    I[]I Y  I[x]I N  I[]I U  I[]I A  I[]I N/A  Minimization of Symptoms Suspected/Evident: **  I[]I Y  I[x]I N  I[]I U  I[]I A  I[]I N/A  Exaggeration of Symptoms Suspected/Evident: **      III[x]III  CLINICAL RISK DETERMINATION:     Current risk is judged to be:  I[x]I Low    I[]I Moderate   I[]I High    The following criteria were met for involuntary psychiatric admission:   I[x]I None    I[]I Dangerous to Self    I[]I Dangerous to Others    I[]I Gravely Disabled      EXAMINATION:     VITALS:     Vitals:    09/16/22 0900   BP: (!) 144/79   Pulse: 72   Resp: 20   Temp: 98.4 °F (36.9 °C)       MENTAL STATUS EXAMINATION:   MENTAL STATUS EXAMINATION  General Appearance: appropriately dressed, adequately groomed  Behavior: cooperative, appropriate eye contact, under good behavioral control  Movements and Motor Activity: no abnormal involuntary movements noted, no psychomotor agitation or retardation  Gait and Station: intact, normal gait and station, ambulates without assistance  Speech: normal rate/rhythm/volume/tone, conversational, spontaneous, coherent  Language: fluent English, repeats words/phrases, no word-finding difficulties noted  Mood: "good"  Affect: euthymic, reactive, appropriate  Thought Process: linear, goal-directed, organized, logical  Associations: intact, no loosening of associations  Thought Content: no suicidal ideation, no homicidal ideation, no paranoid ideation, no ideas of reference, no evidence of delusions or psychosis  Perceptions: no auditory or visual hallucinations  Sensorium: alert  Orientation: oriented fully to person, place, time, and situation  Recent and Remote Memory: recent memory intact, remote memory intact, no " impairments noted  Attention and Concentration: intact, attentive to conversation, not distractible  Fund of Knowledge: intact, aware of current events, vocabulary appropriate  Insight: intact, demonstrates awareness of situation  Judgment: intact, behavior is adequate/appropriate to the circumstances      ASSESSMENT:     III[x]III  DIAGNOSES AND PROBLEMS:             .  Alcohol Use Disorder  Alcoholic Cirrhosis            .      PLAN:     IMPRESSION AND RECOMMENDATIONS:     MANAGEMENT PLAN, TREATMENT GOALS, THERAPEUTIC TECHNIQUES/APPROACHES & CLINICAL REASONING:     Continue acamprosate -- consider increase on Monday if tolerating well    Disposition:    - Continue ORP.  - Continue ORP protocol.  - Additional workup is planned to address substance use disorder, in order to guide and refine ongoing management options, including serial alcohol and drug laboratory testing (e.g. POCT breath alcohol test, urine toxicology, alcohol biomarkers, PETH).  - Full engagement in 12 step (or equivalent) recovery program(s), including mandatory meeting attendance and acquisition of sponsor.  - Patient counseled on abstinence from alcohol and substances of abuse (illicit and prescription).  - Relapse prevention and motivational interviewing provided.  - MAT for alcohol use disorder was discussed including acamprosate, naltrexone and disulfiram.    III[x]III  PRESCRIPTION DRUG MANAGEMENT:     Prescription Drug Management entails the review, recommendation, or consideration without recommendation of medications, and as such was employed during the encounter.    Discussed, to the extent possible, diagnosis, risks and benefits of proposed treatment vs alternative treatments vs no treatment, potential side effects of these treatments and the inherent unpredictability of treatment. The patient's ability to understand, participate and engage in a conversation surrounding this was deemed to be: adequate. The patient expresses  "understanding of the above and displays the capacity to agree with this treatment given said understanding. Patient also agrees that, currently, the benefits outweigh the risks and would like to continue treatment at this time.     Written material has additionally been provided, via the AVS or other pre-printed handouts.      MEDICAL DECISION MAKING:     Shared medical decision making and informed consent are the hallmark and bedrock of good clinical care, and as such have been employed and obtained, respectively, to the degree possible.  Written material has been provided to supplement, augment, and reinforce any discussions and interventions, via the AVS or other pre-printed handouts.    Additional psychoeducation has been provided, as warranted.      LEVEL OF MEDICAL DECISION MAKING AND TIME:     Complexity (level) of medical decision making employed in the encounter: MODERATE    The total time for services performed on the date of the encounter: 15 minutes    Portions of this note may have been created with voice recognition software. Occasional "wrong-word" or "sound-a-like" substitutions may have occurred due to the inherent limitations of voice recognition software. Please, read the note carefully and recognize, using context, where substitutions have occurred.    Arthur Otto, PhD, MD  House Officer - [facility]  Pager: [phone]  09/16/2022      DATA:     DIAGNOSTIC TESTING:     The chart was reviewed for recent diagnostic procedures and investigations, and pertinent results are noted below.      ADDICTION LABORATORY RESULTS:     Urine Toxicology:  Benzodiazepines (no units)   Date Value   09/14/2022 Negative     Barbiturate Screen, Ur (no units)   Date Value   09/14/2022 Negative     Opiate Scrn, Ur (no units)   Date Value   09/14/2022 Negative     Methadone metabolites (no units)   Date Value   09/14/2022 Negative     BUPRENORPHINE (no units)   Date Value   08/02/2022 Not Detected "     FENTANYL (no units)   Date Value   08/02/2022 Not Detected     OXYCODONE (no units)   Date Value   08/02/2022 Not Detected     Cocaine (Metab.) (no units)   Date Value   09/14/2022 Negative     Amphetamine Screen, Ur (no units)   Date Value   09/14/2022 Negative     THC (no units)   Date Value   09/14/2022 Negative     Phencyclidine (no units)   Date Value   09/14/2022 Negative       Alcohol:  PEth 16:0/18.1 (POPEth) (ng/mL)   Date Value   08/15/2022 <10   07/18/2022 135   06/30/2022 663     Alcohol, Serum (mg/dL)   Date Value   07/18/2022 <10     Alcohol, Urine (mg/dL)   Date Value   09/14/2022 <10     Ethyl Glucuronide (ng/mL)   Date Value   09/14/2022 Presumptive Positive (A)     Lab Results   Component Value Date     (H) 08/15/2022     (H) 08/15/2022    AST 42 (H) 09/14/2022    ALT 25 09/14/2022    GGT 81 (H) 07/21/2022    AMMONIA 89 (H) 08/15/2022       Miscellaneous:  No results found for: ALCOHOL, LABBENZ, BARBITURATE, LABMETH, AMPHETAMINE, THCMARIJUANA, LABPHEN, BUPRENORPH, NORBUPRENOR, NICOTINESERU, COTININESERU

## 2022-09-17 LAB — ETHYL GLUCURONIDE: NEGATIVE NG/ML

## 2022-09-19 ENCOUNTER — HOSPITAL ENCOUNTER (OUTPATIENT)
Dept: PSYCHIATRY | Facility: HOSPITAL | Age: 47
Discharge: HOME | End: 2022-09-19
Attending: PSYCHIATRY & NEUROLOGY
Payer: MEDICARE

## 2022-09-19 DIAGNOSIS — F10.20 ALCOHOL USE DISORDER, SEVERE, DEPENDENCE: Primary | Chronic | ICD-10-CM

## 2022-09-19 DIAGNOSIS — Z79.899 LONG-TERM USE OF HIGH-RISK MEDICATION: ICD-10-CM

## 2022-09-19 DIAGNOSIS — K70.30 ALCOHOLIC CIRRHOSIS, UNSPECIFIED WHETHER ASCITES PRESENT: ICD-10-CM

## 2022-09-19 DIAGNOSIS — Z79.899 LONG-TERM USE OF HIGH-RISK MEDICATION: Primary | ICD-10-CM

## 2022-09-19 LAB
AMPHET+METHAMPHET UR QL: NEGATIVE
BARBITURATES UR QL SCN>200 NG/ML: NEGATIVE
BENZODIAZ UR QL SCN>200 NG/ML: NEGATIVE
BZE UR QL SCN: NEGATIVE
CANNABINOIDS UR QL SCN: NEGATIVE
CREAT UR-MCNC: 237 MG/DL (ref 23–375)
ETHANOL UR-MCNC: <10 MG/DL
METHADONE UR QL SCN>300 NG/ML: NEGATIVE
OPIATES UR QL SCN: NEGATIVE
PCP UR QL SCN>25 NG/ML: NEGATIVE
TOXICOLOGY INFORMATION: NORMAL

## 2022-09-19 PROCEDURE — 90853 GROUP PSYCHOTHERAPY: CPT | Performed by: SOCIAL WORKER

## 2022-09-19 PROCEDURE — 90853 GROUP PSYCHOTHERAPY: CPT | Mod: ,,, | Performed by: PSYCHOLOGIST

## 2022-09-19 PROCEDURE — 80307 DRUG TEST PRSMV CHEM ANLYZR: CPT | Performed by: PSYCHIATRY & NEUROLOGY

## 2022-09-19 PROCEDURE — 90853 PR GROUP PSYCHOTHERAPY: ICD-10-PCS | Mod: ,,, | Performed by: PSYCHOLOGIST

## 2022-09-19 PROCEDURE — 99232 PR SUBSEQUENT HOSPITAL CARE,LEVL II: ICD-10-PCS | Mod: 25,,, | Performed by: PSYCHIATRY & NEUROLOGY

## 2022-09-19 PROCEDURE — 90853 GROUP PSYCHOTHERAPY: CPT

## 2022-09-19 PROCEDURE — 99232 SBSQ HOSP IP/OBS MODERATE 35: CPT | Mod: 25,,, | Performed by: PSYCHIATRY & NEUROLOGY

## 2022-09-19 NOTE — PLAN OF CARE
09/16/22 1400   Activity/Group Therapy Checklist   Group Goals/Reflection   Attendance Attended   Follows Direction Followed directions   Group Interactions/Observations Interacted appropriately   Affect/Mood Range Normal range   Affect/Mood Display Appropriate   Goal Progression Progressing

## 2022-09-19 NOTE — PLAN OF CARE
09/19/22 1400   Activity/Group Therapy Checklist   Group Relapse Prevention   Attendance Attended   Follows Direction Followed directions   Group Interactions/Observations Interacted appropriately   Affect/Mood Range Normal range   Affect/Mood Display Appropriate   Goal Progression Progressing

## 2022-09-19 NOTE — PROGRESS NOTES
[facility]  DEPARTMENT OF PSYCHIATRY    SUBSEQUENT VISIT  [facility]    KEY:     I[]I Y = II[x][]II = Yes / Present / Present Though Denies / Endorses  I[]I N = II[][x]II = No / Absent / Absent Though Endorses / Denies  I[]I U = II[][]II = Unknown / Unable to Assess/Enact / Unwilling to Participate/Provide  I[]I A = II[x][x]II = Ambiguity / Uncertainty of Accuracy Exists  I[]I D = Denial or Minimization is Suspected/Evident  I[]I N/A = Non-Applicable    CHIEF COMPLAINT:     Patient Name: Irvin Diaz Jr.  YOB: 1975  MRN: [id number]    Irvin Diaz Jr. is a 47 y.o. male who is being seen by me for a follow up visit.  Irvin Diaz Jr. presents with the chief complaint of: problematic substance use/abuse and alcohol and/or drug addiction    CHART REVIEW:     Available documentation has been reviewed, and pertinent elements of the chart - including previous psychiatric evaluations - have been incorporated into this evaluation where appropriate.    PRESENTATION:     HPI, PSYCHIATRIC ROS & APPLICABLE MEDICAL ROS:   .  Pt doing well this AM.   Pt states that their mood is good.   Pt had a good weekend. Pt was able to go to meetings and liked them. Pt is still looking for a sponsor.  Pt was hanging out with friends and watching football. Pt had no issues with cravings or urges.  Pt has been taking acamprosate and tolerating it well. Discussed and pt will trial increase in med to help optimize control of cravings.  Pt has been compliant with all meds and denies any ADEs to medication.   Pt has been eating well and maintaining proper hygiene. Sleep is improving.   Pt did not endorse any further positive medical or psychiatric symptomology on ROS.   Pt denies any further physical or other complaints.          .  CURRENT PSYCHOTROPIC REGIMEN:     Acamprosate - 666mg TID    HISTORY:     PFSH: The patient's past psychiatric, family, social and medical history have been  "reviewed and updated as appropriate within the electronic medical record system.            The electronic chart was reviewed and updated as appropriate.  See [employer] for details.           RISK:     III[x]III  RISK PARAMETERS:     The following risk parameters were assessed during this evaluation:    I[]I Y  I[x]I N  I[]I U  I[]I A  Suicidal Ideation/Behavior: **   I[]I Y  I[x]I N  I[]I U  I[]I A  Homicidal Ideation/Behavior: **  I[]I Y  I[x]I N  I[]I U  I[]I A  Violence: **  I[]I Y  I[x]I N  I[]I U  I[]I A  Self-Injurious Behavior: **    I[]I Y  I[x]I N  I[]I U  I[]I A  I[]I N/A  Minimization of Symptoms Suspected/Evident: **  I[]I Y  I[x]I N  I[]I U  I[]I A  I[]I N/A  Exaggeration of Symptoms Suspected/Evident: **      III[x]III  CLINICAL RISK DETERMINATION:     Current risk is judged to be:  I[x]I Low    I[]I Moderate   I[]I High    The following criteria were met for involuntary psychiatric admission:   I[x]I None    I[]I Dangerous to Self    I[]I Dangerous to Others    I[]I Gravely Disabled      EXAMINATION:     VITALS:     There were no vitals filed for this visit.      MENTAL STATUS EXAMINATION:   MENTAL STATUS EXAMINATION  General Appearance: appropriately dressed, adequately groomed  Behavior: cooperative, appropriate eye contact, under good behavioral control  Movements and Motor Activity: no abnormal involuntary movements noted, no psychomotor agitation or retardation  Gait and Station: intact, normal gait and station, ambulates without assistance  Speech: normal rate/rhythm/volume/tone, conversational, spontaneous, coherent  Language: fluent English, repeats words/phrases, no word-finding difficulties noted  Mood: "okay"  Affect: euthymic, reactive, appropriate  Thought Process: linear, goal-directed, organized, logical  Associations: intact, no loosening of associations  Thought Content: no suicidal ideation, no homicidal ideation, no paranoid ideation, no ideas of reference, no evidence of delusions " or psychosis  Perceptions: no auditory or visual hallucinations  Sensorium: alert  Orientation: oriented fully to person, place, time, and situation  Recent and Remote Memory: recent memory intact, remote memory intact, no impairments noted  Attention and Concentration: intact, attentive to conversation, not distractible  Fund of Knowledge: intact, aware of current events, vocabulary appropriate  Insight: intact, demonstrates awareness of situation  Judgment: intact, behavior is adequate/appropriate to the circumstances      ASSESSMENT:     III[x]III  DIAGNOSES AND PROBLEMS:             .  Alcohol Use Disorder  Alcoholic Cirrhosis            .      PLAN:     IMPRESSION AND RECOMMENDATIONS:     MANAGEMENT PLAN, TREATMENT GOALS, THERAPEUTIC TECHNIQUES/APPROACHES & CLINICAL REASONING:     Continue acamprosate 666mg TID    TO DO  - find sponsor  - family meeting Tuesday  - PETH early to mid October  - find psychyiatrist to follow up with after IOP    Disposition:    - Continue ORP.  - Continue ORP protocol.  - Additional workup is planned to address substance use disorder, in order to guide and refine ongoing management options, including serial alcohol and drug laboratory testing (e.g. POCT breath alcohol test, urine toxicology, alcohol biomarkers, PETH).  - Full engagement in 12 step (or equivalent) recovery program(s), including mandatory meeting attendance and acquisition of sponsor.  - Patient counseled on abstinence from alcohol and substances of abuse (illicit and prescription).  - Relapse prevention and motivational interviewing provided.  - MAT for alcohol use disorder was discussed including acamprosate, naltrexone and disulfiram.    III[x]III  PRESCRIPTION DRUG MANAGEMENT:     Prescription Drug Management entails the review, recommendation, or consideration without recommendation of medications, and as such was employed during the encounter.    Discussed, to the extent possible, diagnosis, risks and benefits  "of proposed treatment vs alternative treatments vs no treatment, potential side effects of these treatments and the inherent unpredictability of treatment. The patient's ability to understand, participate and engage in a conversation surrounding this was deemed to be: adequate. The patient expresses understanding of the above and displays the capacity to agree with this treatment given said understanding. Patient also agrees that, currently, the benefits outweigh the risks and would like to continue treatment at this time.     Written material has additionally been provided, via the AVS or other pre-printed handouts.      MEDICAL DECISION MAKING:     Shared medical decision making and informed consent are the hallmark and bedrock of good clinical care, and as such have been employed and obtained, respectively, to the degree possible.  Written material has been provided to supplement, augment, and reinforce any discussions and interventions, via the AVS or other pre-printed handouts.    Additional psychoeducation has been provided, as warranted.      LEVEL OF MEDICAL DECISION MAKING AND TIME:     Complexity (level) of medical decision making employed in the encounter: MODERATE    The total time for services performed on the date of the encounter: 15 minutes    Portions of this note may have been created with voice recognition software. Occasional "wrong-word" or "sound-a-like" substitutions may have occurred due to the inherent limitations of voice recognition software. Please, read the note carefully and recognize, using context, where substitutions have occurred.    Arthur Otto, PhD, MD  House Officer - [facility]  Pager: [phone]  09/19/2022      DATA:     DIAGNOSTIC TESTING:     The chart was reviewed for recent diagnostic procedures and investigations, and pertinent results are noted below.      ADDICTION LABORATORY RESULTS:     Urine Toxicology:  Benzodiazepines (no units)   Date Value "   09/16/2022 Negative     Barbiturate Screen, Ur (no units)   Date Value   09/16/2022 Negative     Opiate Scrn, Ur (no units)   Date Value   09/16/2022 Negative     Methadone metabolites (no units)   Date Value   09/16/2022 Negative     BUPRENORPHINE (no units)   Date Value   08/02/2022 Not Detected     FENTANYL (no units)   Date Value   08/02/2022 Not Detected     OXYCODONE (no units)   Date Value   08/02/2022 Not Detected     Cocaine (Metab.) (no units)   Date Value   09/16/2022 Negative     Amphetamine Screen, Ur (no units)   Date Value   09/16/2022 Negative     THC (no units)   Date Value   09/16/2022 Negative     Phencyclidine (no units)   Date Value   09/16/2022 Negative       Alcohol:  PEth 16:0/18.1 (POPEth) (ng/mL)   Date Value   09/14/2022 684   08/15/2022 <10   07/18/2022 135   06/30/2022 663     Alcohol, Serum (mg/dL)   Date Value   07/18/2022 <10     Alcohol, Urine (mg/dL)   Date Value   09/16/2022 <10     Ethyl Glucuronide (ng/mL)   Date Value   09/16/2022 Negative     Ethyl Sulfate (ng/mL)   Date Value   09/12/2022 >06903     Lab Results   Component Value Date     (H) 08/15/2022     (H) 08/15/2022    AST 42 (H) 09/14/2022    ALT 25 09/14/2022    GGT 81 (H) 07/21/2022    AMMONIA 89 (H) 08/15/2022       Miscellaneous:  No results found for: ALCOHOL, LABBENZ, BARBITURATE, LABMETH, AMPHETAMINE, THCMARIJUANA, LABPHEN, BUPRENORPH, NORBUPRENOR, NICOTINESERU, COTININESERU

## 2022-09-19 NOTE — PROGRESS NOTES
Group Psychotherapy (PhD/LCSW)    Site: [facility]    Clinical status of patient: Intensive Outpatient Program (IOP)    Date: 09/19/2022    Group Focus: ACT Skills    Length of service: 97061 - 45-50 minutes    Number of patients in attendance: 8    Referred by: [facility] Treatment Team    Target symptoms: Depression, anxiety    Patient's response to treatment: Active Listening and Self-disclosure    Progress toward goals: Progressing adequately    Interval History: Session focus was Psychological Flexibility and the ACT Matrix. Patient populated the matrix with personalized experiences in the designated quadrants (toward, away, internal, and external). Patients were encouraged to use present moment awareness to practice noticing their toward and away moves to assist with making values-based actions.    Risk parameters:  Patient reports no suicidal ideation  Patient reports no homicidal ideation  Patient reports no self-injurious behavior  Patient reports no violent behavior    Diagnosis:     ICD-10-CM ICD-9-CM   1. Alcohol use disorder, severe, dependence  F10.20 303.90   2. Alcoholic cirrhosis, unspecified whether ascites present  K70.30 571.2       Plan: Continue treatment on ORP

## 2022-09-19 NOTE — PROGRESS NOTES
Group Psychotherapy (PhD/LCSW)    Site: [facility]    Clinical status of patient: Intensive Outpatient Program (IOP)    Date: 9/19/2022    Group Focus: Psychodynamic Group Psychotherapy    Length of service: 15793 - 45-50 minutes    Number of patients in attendance: 4    Referred by: Addictive Behavior Unit Treatment Team    Target symptoms: Alcohol Abuse    Patient's response to treatment: Active Listening and Self-disclosure    Progress toward goals: Progressing adequately    Interval History: Pt reports continued sobriety. He shared his feelings about his estranged wife today.     Diagnosis: Alcohol Use Disorder    Plan: Continue treatment on ABU

## 2022-09-20 ENCOUNTER — HOSPITAL ENCOUNTER (OUTPATIENT)
Dept: PSYCHIATRY | Facility: HOSPITAL | Age: 47
Discharge: HOME | End: 2022-09-20
Attending: PSYCHIATRY & NEUROLOGY
Payer: MEDICARE

## 2022-09-20 DIAGNOSIS — K70.30 ALCOHOLIC CIRRHOSIS, UNSPECIFIED WHETHER ASCITES PRESENT: ICD-10-CM

## 2022-09-20 DIAGNOSIS — F10.20 ALCOHOL USE DISORDER, SEVERE, DEPENDENCE: Primary | Chronic | ICD-10-CM

## 2022-09-20 LAB — ETHYL GLUCURONIDE: NEGATIVE NG/ML

## 2022-09-20 PROCEDURE — 99232 SBSQ HOSP IP/OBS MODERATE 35: CPT | Mod: 25,,, | Performed by: PSYCHIATRY & NEUROLOGY

## 2022-09-20 PROCEDURE — 90853 GROUP PSYCHOTHERAPY: CPT | Performed by: SOCIAL WORKER

## 2022-09-20 PROCEDURE — 90853 PR GROUP PSYCHOTHERAPY: ICD-10-PCS | Mod: ,,, | Performed by: PSYCHOLOGIST

## 2022-09-20 PROCEDURE — 90853 GROUP PSYCHOTHERAPY: CPT

## 2022-09-20 PROCEDURE — 90853 GROUP PSYCHOTHERAPY: CPT | Mod: ,,, | Performed by: PSYCHOLOGIST

## 2022-09-20 PROCEDURE — 99232 PR SUBSEQUENT HOSPITAL CARE,LEVL II: ICD-10-PCS | Mod: 25,,, | Performed by: PSYCHIATRY & NEUROLOGY

## 2022-09-20 NOTE — PROGRESS NOTES
Group Psychotherapy (PhD/LCSW)    Site: [facility]    Clinical status of patient: Intensive Outpatient Program (IOP)    Date: 9/20/2022    Group Focus: Psychodynamic Group Psychotherapy    Length of service: 45870 - 45-50 minutes    Number of patients in attendance: 4    Referred by: Add[facility] Behavior [facility] Treatment Team    Target symptoms: Alcohol Abuse    Patient's response to treatment: Active Listening and Self-disclosure    Progress toward goals: Progressing adequately    Interval History: Pt reports continued sobriety. He discussed his family meeting today. Group gave him feedback about how to improve his emotional communication with wife.    Diagnosis: Alcohol Use Disorder    Plan: Continue treatment on ABU

## 2022-09-20 NOTE — PLAN OF CARE
"   09/20/22 0910   Activity/Group Therapy Checklist   Group Meditation/Relaxation   Attendance Attended   Follows Direction Followed directions   Group Interactions/Observations Interacted appropriately;Alert;Sharing;Supportive   Affect/Mood Range Normal range   Affect/Mood Display Appropriate   Goal Progression Progressing     Discussed mindfulness as a practice, the definition of mindfulness, and various research evidence to strengthen benefits of mindfulness. Engaged in "thought bubble" meditation where members were invited to witness body sensations, breath, and thoughts coming and going without attaching onto them.   "

## 2022-09-20 NOTE — PLAN OF CARE
09/20/22 1100   Activity/Group Therapy Checklist   Group Educational   Attendance Attended   Follows Direction Followed directions   Group Interactions/Observations Interacted appropriately   Affect/Mood Range Normal range   Affect/Mood Display Appropriate   Goal Progression Progressing

## 2022-09-20 NOTE — PROGRESS NOTES
"    [facility]  DEPARTMENT OF PSYCHIATRY    SUBSEQUENT VISIT  [facility]    KEY:     I[]I Y = II[x][]II = Yes / Present / Present Though Denies / Endorses  I[]I N = II[][x]II = No / Absent / Absent Though Endorses / Denies  I[]I U = II[][]II = Unknown / Unable to Assess/Enact / Unwilling to Participate/Provide  I[]I A = II[x][x]II = Ambiguity / Uncertainty of Accuracy Exists  I[]I D = Denial or Minimization is Suspected/Evident  I[]I N/A = Non-Applicable    CHIEF COMPLAINT:     Patient Name: Irvin Diaz Jr.  YOB: 1975  MRN: [id number]    Irvin Diaz Jr. is a 47 y.o. male who is being seen by me for a follow up visit.  Irvin Diaz Jr. presents with the chief complaint of: problematic substance use/abuse and alcohol and/or drug addiction    CHART REVIEW:     Available documentation has been reviewed, and pertinent elements of the chart - including previous psychiatric evaluations - have been incorporated into this evaluation where appropriate.    PRESENTATION:     HPI, PSYCHIATRIC ROS & APPLICABLE MEDICAL ROS:   .  Pt interviewed with ex-wife/significant other in room as part of family meeting.  Pt doing well this AM.   Pt states that their mood is okay and is tolerating increase in acamporosate well  Family meeting went well and discussed program and pt's progression within the program. Issues discussed were triggers as pt has had issues with substance use for years. Issues seem to stem from not wanting to deal with stressful situations and wanting to feel better. Wife concerned about long term sobriety and had questions about the "root cause" of huis substance use. Discussed addiction and how sobriety is taking one day at a time. Pt feels like the stress of work and his wife contributed to use. Pt also thinks enviorment in [facility] with friends who drink has caused some issues. Concern over mental status and meds came up. Discussed affect of liver on mentation and " how medications play a role in sobriety. Discussed disulfram vs acamprosate. Overall meeting went well and wife seems supportive. Pt's only concern was about how to handle stress. Pt mentioned using playing it forward, taking one day at a time, and other coping strategies. All other questions answered.     .  CURRENT PSYCHOTROPIC REGIMEN:     Acamprosate - 666mg TID    HISTORY:     PFSH: The patient's past psychiatric, family, social and medical history have been reviewed and updated as appropriate within the electronic medical record system.            The electronic chart was reviewed and updated as appropriate.  See [employer] for details.           RISK:     III[x]III  RISK PARAMETERS:     The following risk parameters were assessed during this evaluation:    I[]I Y  I[x]I N  I[]I U  I[]I A  Suicidal Ideation/Behavior: **   I[]I Y  I[x]I N  I[]I U  I[]I A  Homicidal Ideation/Behavior: **  I[]I Y  I[x]I N  I[]I U  I[]I A  Violence: **  I[]I Y  I[x]I N  I[]I U  I[]I A  Self-Injurious Behavior: **    I[]I Y  I[x]I N  I[]I U  I[]I A  I[]I N/A  Minimization of Symptoms Suspected/Evident: **  I[]I Y  I[x]I N  I[]I U  I[]I A  I[]I N/A  Exaggeration of Symptoms Suspected/Evident: **      III[x]III  CLINICAL RISK DETERMINATION:     Current risk is judged to be:  I[x]I Low    I[]I Moderate   I[]I High    The following criteria were met for involuntary psychiatric admission:   I[x]I None    I[]I Dangerous to Self    I[]I Dangerous to Others    I[]I Gravely Disabled      EXAMINATION:     VITALS:     There were no vitals filed for this visit.      MENTAL STATUS EXAMINATION:   MENTAL STATUS EXAMINATION  General Appearance: appropriately dressed, adequately groomed  Behavior: cooperative, appropriate eye contact, under good behavioral control  Movements and Motor Activity: no abnormal involuntary movements noted, no psychomotor agitation or retardation  Gait and Station: intact, normal gait and station, ambulates without  "assistance  Speech: normal rate/rhythm/volume/tone, conversational, spontaneous, coherent  Language: fluent English, repeats words/phrases, no word-finding difficulties noted  Mood: "fine"  Affect: euthymic, reactive, appropriate  Thought Process: linear, goal-directed, organized, logical  Associations: intact, no loosening of associations  Thought Content: no suicidal ideation, no homicidal ideation, no paranoid ideation, no ideas of reference, no evidence of delusions or psychosis  Perceptions: no auditory or visual hallucinations  Sensorium: alert  Orientation: oriented fully to person, place, time, and situation  Recent and Remote Memory: recent memory intact, remote memory intact, no impairments noted  Attention and Concentration: intact, attentive to conversation, not distractible  Fund of Knowledge: intact, aware of current events, vocabulary appropriate  Insight: intact, demonstrates awareness of situation  Judgment: intact, behavior is adequate/appropriate to the circumstances      ASSESSMENT:     III[x]III  DIAGNOSES AND PROBLEMS:             .  Alcohol Use Disorder  Alcoholic Cirrhosis            .  PLAN:     IMPRESSION AND RECOMMENDATIONS:     MANAGEMENT PLAN, TREATMENT GOALS, THERAPEUTIC TECHNIQUES/APPROACHES & CLINICAL REASONING:     Continue acamprosate 666mg TID    TO DO  - find sponsor  - PET early to mid October  - find psychyiatrist to follow up with after IOP    Disposition:    - Continue ORP.  - Continue ORP protocol.  - Additional workup is planned to address substance use disorder, in order to guide and refine ongoing management options, including serial alcohol and drug laboratory testing (e.g. POCT breath alcohol test, urine toxicology, alcohol biomarkers, PETH).  - Full engagement in 12 step (or equivalent) recovery program(s), including mandatory meeting attendance and acquisition of sponsor.  - Patient counseled on abstinence from alcohol and substances of abuse (illicit and " "prescription).  - Relapse prevention and motivational interviewing provided.  - MAT for alcohol use disorder was discussed including acamprosate, naltrexone and disulfiram.    III[x]III  PRESCRIPTION DRUG MANAGEMENT:     Prescription Drug Management entails the review, recommendation, or consideration without recommendation of medications, and as such was employed during the encounter.    Discussed, to the extent possible, diagnosis, risks and benefits of proposed treatment vs alternative treatments vs no treatment, potential side effects of these treatments and the inherent unpredictability of treatment. The patient's ability to understand, participate and engage in a conversation surrounding this was deemed to be: adequate. The patient expresses understanding of the above and displays the capacity to agree with this treatment given said understanding. Patient also agrees that, currently, the benefits outweigh the risks and would like to continue treatment at this time.     Written material has additionally been provided, via the AVS or other pre-printed handouts.      MEDICAL DECISION MAKING:     Shared medical decision making and informed consent are the hallmark and bedrock of good clinical care, and as such have been employed and obtained, respectively, to the degree possible.  Written material has been provided to supplement, augment, and reinforce any discussions and interventions, via the AVS or other pre-printed handouts.    Additional psychoeducation has been provided, as warranted.      LEVEL OF MEDICAL DECISION MAKING AND TIME:     Complexity (level) of medical decision making employed in the encounter: MODERATE    The total time for services performed on the date of the encounter: 23 minutes    Portions of this note may have been created with voice recognition software. Occasional "wrong-word" or "sound-a-like" substitutions may have occurred due to the inherent limitations of voice recognition " software. Please, read the note carefully and recognize, using context, where substitutions have occurred.    Arthur Otto, PhD, MD  House Officer - [facility]  Pager: [phone]  09/20/2022      DATA:     DIAGNOSTIC TESTING:     The chart was reviewed for recent diagnostic procedures and investigations, and pertinent results are noted below.      ADDICTION LABORATORY RESULTS:     Urine Toxicology:  Benzodiazepines (no units)   Date Value   09/19/2022 Negative     Barbiturate Screen, Ur (no units)   Date Value   09/19/2022 Negative     Opiate Scrn, Ur (no units)   Date Value   09/19/2022 Negative     Methadone metabolites (no units)   Date Value   09/19/2022 Negative     BUPRENORPHINE (no units)   Date Value   08/02/2022 Not Detected     FENTANYL (no units)   Date Value   08/02/2022 Not Detected     OXYCODONE (no units)   Date Value   08/02/2022 Not Detected     Cocaine (Metab.) (no units)   Date Value   09/19/2022 Negative     Amphetamine Screen, Ur (no units)   Date Value   09/19/2022 Negative     THC (no units)   Date Value   09/19/2022 Negative     Phencyclidine (no units)   Date Value   09/19/2022 Negative       Alcohol:  PEth 16:0/18.1 (POPEth) (ng/mL)   Date Value   09/14/2022 684   08/15/2022 <10   07/18/2022 135   06/30/2022 663     Alcohol, Serum (mg/dL)   Date Value   07/18/2022 <10     Alcohol, Urine (mg/dL)   Date Value   09/19/2022 <10     Ethyl Glucuronide (ng/mL)   Date Value   09/19/2022 Negative     Ethyl Sulfate (ng/mL)   Date Value   09/12/2022 >75547     Lab Results   Component Value Date     (H) 08/15/2022     (H) 08/15/2022    AST 42 (H) 09/14/2022    ALT 25 09/14/2022    GGT 81 (H) 07/21/2022    AMMONIA 89 (H) 08/15/2022       Miscellaneous:  No results found for: ALCOHOL, LABBENZ, BARBITURATE, LABMETH, AMPHETAMINE, THCMARIJUANA, LABPHEN, BUPRENORPH, NORBUPRENOR, NICOTINESERU, COTININESERU      STAFF NOTE    The patient was seen by me, the chart was  reviewed, and the case was discussed with the resident.  I agree with the above assessment and plan.    Family meeting today with ex-wife.      Thaddeus Odonnell MD

## 2022-09-20 NOTE — PROGRESS NOTES
"      [facility]  DEPARTMENT OF PSYCHIATRY    SUBSEQUENT VISIT  [facility]    KEY:     I[]I Y = II[x][]II = Yes / Present / Present Though Denies / Endorses  I[]I N = II[][x]II = No / Absent / Absent Though Endorses / Denies  I[]I U = II[][]II = Unknown / Unable to Assess/Enact / Unwilling to Participate/Provide  I[]I A = II[x][x]II = Ambiguity / Uncertainty of Accuracy Exists  I[]I D = Denial or Minimization is Suspected/Evident  I[]I N/A = Non-Applicable    CHIEF COMPLAINT:     Patient Name: Irvin Diaz Jr.  YOB: 1975  MRN: [id number]    Irvin Diaz Jr. is a 47 y.o. male who is being seen by me for a follow up visit.  Irvin Diaz Jr. presents with the chief complaint of: problematic substance use/abuse and alcohol and/or drug addiction    CHART REVIEW:     Available documentation has been reviewed, and pertinent elements of the chart - including previous psychiatric evaluations - have been incorporated into this evaluation where appropriate.    PRESENTATION:     HPI, PSYCHIATRIC ROS & APPLICABLE MEDICAL ROS:   .  Pt interviewed with ex-wife/significant other in room as part of family meeting.  Pt doing well this AM.   Pt states that their mood is okay and is tolerating increase in acamporosate well  Family meeting went well and discussed program and pt's progression within the program. Issues discussed were triggers as pt has had issues with substance use for years. Issues seem to stem from not wanting to deal with stressful situations and wanting to feel better. Wife concerned about long term sobriety and had questions about the "root cause" of huis substance use. Discussed addiction and how sobriety is taking one day at a time. Pt feels like the stress of work and his wife contributed to use. Pt also thinks enviorment in [facility] with friends who drink has caused some issues. Concern over mental status and meds came up. Discussed affect of liver on mentation " and how medications play a role in sobriety. Discussed disulfram vs acamprosate. Overall meeting went well and wife seems supportive. Pt's only concern was about how to handle stress. Pt mentioned using playing it forward, taking one day at a time, and other coping strategies. All other questions answered.     .  CURRENT PSYCHOTROPIC REGIMEN:     Acamprosate - 666mg TID    HISTORY:     PFSH: The patient's past psychiatric, family, social and medical history have been reviewed and updated as appropriate within the electronic medical record system.            The electronic chart was reviewed and updated as appropriate.  See [employer] for details.           RISK:     III[x]III  RISK PARAMETERS:     The following risk parameters were assessed during this evaluation:    I[]I Y  I[x]I N  I[]I U  I[]I A  Suicidal Ideation/Behavior: **   I[]I Y  I[x]I N  I[]I U  I[]I A  Homicidal Ideation/Behavior: **  I[]I Y  I[x]I N  I[]I U  I[]I A  Violence: **  I[]I Y  I[x]I N  I[]I U  I[]I A  Self-Injurious Behavior: **    I[]I Y  I[x]I N  I[]I U  I[]I A  I[]I N/A  Minimization of Symptoms Suspected/Evident: **  I[]I Y  I[x]I N  I[]I U  I[]I A  I[]I N/A  Exaggeration of Symptoms Suspected/Evident: **      III[x]III  CLINICAL RISK DETERMINATION:     Current risk is judged to be:  I[x]I Low    I[]I Moderate   I[]I High    The following criteria were met for involuntary psychiatric admission:   I[x]I None    I[]I Dangerous to Self    I[]I Dangerous to Others    I[]I Gravely Disabled      EXAMINATION:     VITALS:     There were no vitals filed for this visit.      MENTAL STATUS EXAMINATION:   MENTAL STATUS EXAMINATION  General Appearance: appropriately dressed, adequately groomed  Behavior: cooperative, appropriate eye contact, under good behavioral control  Movements and Motor Activity: no abnormal involuntary movements noted, no psychomotor agitation or retardation  Gait and Station: intact, normal gait and station, ambulates without  "assistance  Speech: normal rate/rhythm/volume/tone, conversational, spontaneous, coherent  Language: fluent English, repeats words/phrases, no word-finding difficulties noted  Mood: "fine"  Affect: euthymic, reactive, appropriate  Thought Process: linear, goal-directed, organized, logical  Associations: intact, no loosening of associations  Thought Content: no suicidal ideation, no homicidal ideation, no paranoid ideation, no ideas of reference, no evidence of delusions or psychosis  Perceptions: no auditory or visual hallucinations  Sensorium: alert  Orientation: oriented fully to person, place, time, and situation  Recent and Remote Memory: recent memory intact, remote memory intact, no impairments noted  Attention and Concentration: intact, attentive to conversation, not distractible  Fund of Knowledge: intact, aware of current events, vocabulary appropriate  Insight: intact, demonstrates awareness of situation  Judgment: intact, behavior is adequate/appropriate to the circumstances      ASSESSMENT:     III[x]III  DIAGNOSES AND PROBLEMS:             .  Alcohol Use Disorder  Alcoholic Cirrhosis            .  PLAN:     IMPRESSION AND RECOMMENDATIONS:     MANAGEMENT PLAN, TREATMENT GOALS, THERAPEUTIC TECHNIQUES/APPROACHES & CLINICAL REASONING:     Continue acamprosate 666mg TID    TO DO  - find sponsor  - PET early to mid October  - find psychyiatrist to follow up with after IOP    Disposition:    - Continue ORP.  - Continue ORP protocol.  - Additional workup is planned to address substance use disorder, in order to guide and refine ongoing management options, including serial alcohol and drug laboratory testing (e.g. POCT breath alcohol test, urine toxicology, alcohol biomarkers, PETH).  - Full engagement in 12 step (or equivalent) recovery program(s), including mandatory meeting attendance and acquisition of sponsor.  - Patient counseled on abstinence from alcohol and substances of abuse (illicit and " "prescription).  - Relapse prevention and motivational interviewing provided.  - MAT for alcohol use disorder was discussed including acamprosate, naltrexone and disulfiram.    III[x]III  PRESCRIPTION DRUG MANAGEMENT:     Prescription Drug Management entails the review, recommendation, or consideration without recommendation of medications, and as such was employed during the encounter.    Discussed, to the extent possible, diagnosis, risks and benefits of proposed treatment vs alternative treatments vs no treatment, potential side effects of these treatments and the inherent unpredictability of treatment. The patient's ability to understand, participate and engage in a conversation surrounding this was deemed to be: adequate. The patient expresses understanding of the above and displays the capacity to agree with this treatment given said understanding. Patient also agrees that, currently, the benefits outweigh the risks and would like to continue treatment at this time.     Written material has additionally been provided, via the AVS or other pre-printed handouts.      MEDICAL DECISION MAKING:     Shared medical decision making and informed consent are the hallmark and bedrock of good clinical care, and as such have been employed and obtained, respectively, to the degree possible.  Written material has been provided to supplement, augment, and reinforce any discussions and interventions, via the AVS or other pre-printed handouts.    Additional psychoeducation has been provided, as warranted.      LEVEL OF MEDICAL DECISION MAKING AND TIME:     Complexity (level) of medical decision making employed in the encounter: MODERATE    The total time for services performed on the date of the encounter: 23 minutes    Portions of this note may have been created with voice recognition software. Occasional "wrong-word" or "sound-a-like" substitutions may have occurred due to the inherent limitations of voice recognition " software. Please, read the note carefully and recognize, using context, where substitutions have occurred.    Arthur Otto, PhD, MD  House Officer - [facility]  Pager: [phone]  09/20/2022      DATA:     DIAGNOSTIC TESTING:     The chart was reviewed for recent diagnostic procedures and investigations, and pertinent results are noted below.      ADDICTION LABORATORY RESULTS:     Urine Toxicology:  Benzodiazepines (no units)   Date Value   09/19/2022 Negative     Barbiturate Screen, Ur (no units)   Date Value   09/19/2022 Negative     Opiate Scrn, Ur (no units)   Date Value   09/19/2022 Negative     Methadone metabolites (no units)   Date Value   09/19/2022 Negative     BUPRENORPHINE (no units)   Date Value   08/02/2022 Not Detected     FENTANYL (no units)   Date Value   08/02/2022 Not Detected     OXYCODONE (no units)   Date Value   08/02/2022 Not Detected     Cocaine (Metab.) (no units)   Date Value   09/19/2022 Negative     Amphetamine Screen, Ur (no units)   Date Value   09/19/2022 Negative     THC (no units)   Date Value   09/19/2022 Negative     Phencyclidine (no units)   Date Value   09/19/2022 Negative       Alcohol:  PEth 16:0/18.1 (POPEth) (ng/mL)   Date Value   09/14/2022 684   08/15/2022 <10   07/18/2022 135   06/30/2022 663     Alcohol, Serum (mg/dL)   Date Value   07/18/2022 <10     Alcohol, Urine (mg/dL)   Date Value   09/19/2022 <10     Ethyl Glucuronide (ng/mL)   Date Value   09/16/2022 Negative     Ethyl Sulfate (ng/mL)   Date Value   09/12/2022 >90935     Lab Results   Component Value Date     (H) 08/15/2022     (H) 08/15/2022    AST 42 (H) 09/14/2022    ALT 25 09/14/2022    GGT 81 (H) 07/21/2022    AMMONIA 89 (H) 08/15/2022       Miscellaneous:  No results found for: ALCOHOL, LABBENZ, BARBITURATE, LABMETH, AMPHETAMINE, THCMARIJUANA, LABPHEN, BUPRENORPH, NORBUPRENOR, NICOTINESERU, COTININESERU

## 2022-09-20 NOTE — PROGRESS NOTES
Group Psychotherapy (PhD/LCSW)     Site: [facility]     Clinical status of patient: Intensive Outpatient Program (IOP)     Date: 9/20/2022     Group Focus: Interpersonal Effectiveness Skills     Length of service: 26347 - 50 minutes     Number of patients in attendance: 8     Referred by: [facility] Treatment Team     Target symptoms: Alcohol Use     Patient's response to treatment: Active Listening and Self-disclosure     Progress toward goals: Progressing adequately     Interval History: Interpersonal Effectiveness- Session focused on DEAR MAN skill of objective effectiveness. Patients practiced describing, expressing, asserting, reinforcing, being mindful, appearing confident, and negotiating.        Diagnosis:     ICD-10-CM ICD-9-CM   1. Alcohol use disorder, severe, dependence  F10.20 303.90   2. Alcoholic cirrhosis, unspecified whether ascites present  K70.30 571.2         Plan: Continue treatment on ORP.

## 2022-09-21 ENCOUNTER — HOSPITAL ENCOUNTER (OUTPATIENT)
Dept: PSYCHIATRY | Facility: HOSPITAL | Age: 47
Discharge: HOME | End: 2022-09-21
Attending: PSYCHIATRY & NEUROLOGY
Payer: MEDICARE

## 2022-09-21 VITALS
RESPIRATION RATE: 20 BRPM | HEART RATE: 73 BPM | DIASTOLIC BLOOD PRESSURE: 76 MMHG | SYSTOLIC BLOOD PRESSURE: 141 MMHG | TEMPERATURE: 98 F

## 2022-09-21 DIAGNOSIS — F10.20 ALCOHOL USE DISORDER, SEVERE, DEPENDENCE: Primary | ICD-10-CM

## 2022-09-21 DIAGNOSIS — Z79.899 LONG-TERM USE OF HIGH-RISK MEDICATION: ICD-10-CM

## 2022-09-21 DIAGNOSIS — F10.20 ALCOHOL USE DISORDER, SEVERE, DEPENDENCE: Primary | Chronic | ICD-10-CM

## 2022-09-21 DIAGNOSIS — K70.30 ALCOHOLIC CIRRHOSIS, UNSPECIFIED WHETHER ASCITES PRESENT: ICD-10-CM

## 2022-09-21 LAB
AMPHET+METHAMPHET UR QL: NEGATIVE
BARBITURATES UR QL SCN>200 NG/ML: NEGATIVE
BENZODIAZ UR QL SCN>200 NG/ML: NEGATIVE
BZE UR QL SCN: NEGATIVE
CANNABINOIDS UR QL SCN: NEGATIVE
CREAT UR-MCNC: 198 MG/DL (ref 23–375)
ETHANOL UR-MCNC: <10 MG/DL
ETHYL GLUC/SULFATE INTERPRETATION: NORMAL
ETHYL GLUCURONIDE UR CFM-MCNC: 1886 NG/ML
ETHYL SULFATE UR CFM-MCNC: 1841 NG/ML
METHADONE UR QL SCN>300 NG/ML: NEGATIVE
OPIATES UR QL SCN: NEGATIVE
PCP UR QL SCN>25 NG/ML: NEGATIVE
TOXICOLOGY INFORMATION: NORMAL

## 2022-09-21 PROCEDURE — 90853 GROUP PSYCHOTHERAPY: CPT | Performed by: SOCIAL WORKER

## 2022-09-21 PROCEDURE — 90853 GROUP PSYCHOTHERAPY: CPT | Mod: ,,, | Performed by: PSYCHOLOGIST

## 2022-09-21 PROCEDURE — 90853 GROUP PSYCHOTHERAPY: CPT

## 2022-09-21 PROCEDURE — 90853 PR GROUP PSYCHOTHERAPY: ICD-10-PCS | Mod: ,,, | Performed by: PSYCHOLOGIST

## 2022-09-21 PROCEDURE — 99232 SBSQ HOSP IP/OBS MODERATE 35: CPT | Mod: 25,,, | Performed by: PSYCHIATRY & NEUROLOGY

## 2022-09-21 PROCEDURE — 80307 DRUG TEST PRSMV CHEM ANLYZR: CPT | Performed by: PSYCHIATRY & NEUROLOGY

## 2022-09-21 PROCEDURE — 99232 PR SUBSEQUENT HOSPITAL CARE,LEVL II: ICD-10-PCS | Mod: 25,,, | Performed by: PSYCHIATRY & NEUROLOGY

## 2022-09-21 NOTE — PLAN OF CARE
09/21/22 1453   Activity/Group Therapy Checklist   Group Other (Comments)  (Urge Surfing)   Attendance Attended   Follows Direction Followed directions   Group Interactions/Observations Alert;Interacted appropriately;Sharing;Supportive   Affect/Mood Range Normal range   Affect/Mood Display Appropriate   Goal Progression Progressing     SW and SW Intern discussed Urge Surfing technique with patients for managing unwanted behaviors/urges.  SW intern discussed  the wave pattern (triggers, rise, peak, and fall). SW Intern went over guided meditation script with group.

## 2022-09-21 NOTE — PLAN OF CARE
09/21/22 1100   Activity/Group Therapy Checklist   Group Addiction Education   Attendance Attended   Follows Direction Followed directions   Group Interactions/Observations Interacted appropriately   Affect/Mood Range Normal range   Affect/Mood Display Appropriate   Goal Progression Progressing

## 2022-09-21 NOTE — PROGRESS NOTES
Group Psychotherapy (PhD/LCSW)    Site: [facility]    Clinical status of patient: Intensive Outpatient Program (IOP)    Date: 9/21/2022    Group Focus: Distress Tolerance    Length of service: 60273 - 45-50 minutes    Number of patients in attendance: 7    Referred by: [facility] Treatment Team    Target symptoms: Substance Abuse, Depression and Anxiety    Patient's response to treatment: Active Listening and Self-disclosure    Progress toward goals: Progressing adequately    Interval History: Session focus was Distress Tolerance:  STOP and  Pros & Cons. Patients were encouraged to use crisis survival skills to reduce intensity of distress.  They were taught to STOP in the moment to reduce unhelpful action urges. They were taught to use Pros & Cons to reinforce desired behaviors.    Diagnosis:     ICD-10-CM ICD-9-CM   1. Alcohol use disorder, severe, dependence  F10.20 303.90   2. Alcoholic cirrhosis, unspecified whether ascites present  K70.30 571.2       Plan: Continue treatment on ORP

## 2022-09-21 NOTE — PATIENT CARE CONFERENCE
Diagnosis:   Alcohol Use Disorder  Alcoholic Cirrhosis    Progress:  Patient staffed by team. Patient completed Family Day meeting with estranged spouse. Patient has been appropriate in group. Team to continue to monitor patient's progress.    Plan of Care:   Patient to continue ORP with group and individual therapies.     Aftercare/followup:   Med Management: TBD  Psychotherapy: TBD    Staff Present:    MD Ciarra Bernardo, PhD  Teja Feldman, LCSW  AMANDA Burch, NOESW  Sheron George, W  Jacqui Hernandez, [facility]    Resident: Arthur Otto MD

## 2022-09-21 NOTE — PROGRESS NOTES
Group Psychotherapy (PhD/LCSW)    Site: [facility]    Clinical status of patient: Intensive Outpatient Program (IOP)    Date: 9/21/2022    Group Focus: Psychodynamic Group Psychotherapy    Length of service: 41341 - 45-50 minutes    Number of patients in attendance: 4    Referred by: Add[facility] Behavior [facility] Treatment Team    Target symptoms: Alcohol Abuse    Patient's response to treatment: Active Listening and Self-disclosure    Progress toward goals: Progressing adequately    Interval History: Pt reports continued sobriety. He spoke more about his desire to improve his relationship with his wife, and got feedback from group about his starting to write her a letter.    Diagnosis: Alcohol Use Disorder    Plan: Continue treatment on ABU

## 2022-09-21 NOTE — PROGRESS NOTES
[facility]  DEPARTMENT OF PSYCHIATRY    SUBSEQUENT VISIT  [facility]    KEY:     I[]I Y = II[x][]II = Yes / Present / Present Though Denies / Endorses  I[]I N = II[][x]II = No / Absent / Absent Though Endorses / Denies  I[]I U = II[][]II = Unknown / Unable to Assess/Enact / Unwilling to Participate/Provide  I[]I A = II[x][x]II = Ambiguity / Uncertainty of Accuracy Exists  I[]I D = Denial or Minimization is Suspected/Evident  I[]I N/A = Non-Applicable    CHIEF COMPLAINT:     Patient Name: Irvin Diaz Jr.  YOB: 1975  MRN: [id number]    Irvin Diaz Jr. is a 47 y.o. male who is being seen by me for a follow up visit.  Irvin Diaz Jr. presents with the chief complaint of: problematic substance use/abuse and alcohol and/or drug addiction    CHART REVIEW:     Available documentation has been reviewed, and pertinent elements of the chart - including previous psychiatric evaluations - have been incorporated into this evaluation where appropriate.    PRESENTATION:     HPI, PSYCHIATRIC ROS & APPLICABLE MEDICAL ROS:   .  Pt doing well today.  Pt states that the meeting yesterday went well and he was able to process a lot. Pt had all his questions about the progrma and aftercare answered.  Pt states that his mood is doing well and hasnt had any issues with depression or anxiety.  Pt states that he still has his baseline issues with sleep in that he wakes up often and naps a lot. Pt has this almost every nioght and denies any apnea associated s/sx. Discussed and pt woul like to hold off on any sleep aides at this time. Suggested sleep medicine MD and pt states he will look ionto it.  Because of sleep pt has had issues related to energy and motivation though does not find these things to be impairing the life he wants.  Pt appetite good.  Pt denies any cravings and states that these are well controled on the current meds. Pt would like to continue meds as is. Pt denies any  ADEs.  Rest of ROS grossly negative except for pain in huis hip. P tworking to see his regular Orthio MD to get an injection for this.  Pt denies any further complaints.    CURRENT PSYCHOTROPIC REGIMEN:     Acamprosate - 666mg TID    HISTORY:     PFSH: The patient's past psychiatric, family, social and medical history have been reviewed and updated as appropriate within the electronic medical record system.            The electronic chart was reviewed and updated as appropriate.  See [employer] for details.           RISK:     III[x]III  RISK PARAMETERS:     The following risk parameters were assessed during this evaluation:    I[]I Y  I[x]I N  I[]I U  I[]I A  Suicidal Ideation/Behavior: **   I[]I Y  I[x]I N  I[]I U  I[]I A  Homicidal Ideation/Behavior: **  I[]I Y  I[x]I N  I[]I U  I[]I A  Violence: **  I[]I Y  I[x]I N  I[]I U  I[]I A  Self-Injurious Behavior: **    I[]I Y  I[x]I N  I[]I U  I[]I A  I[]I N/A  Minimization of Symptoms Suspected/Evident: **  I[]I Y  I[x]I N  I[]I U  I[]I A  I[]I N/A  Exaggeration of Symptoms Suspected/Evident: **      III[x]III  CLINICAL RISK DETERMINATION:     Current risk is judged to be:  I[x]I Low    I[]I Moderate   I[]I High    The following criteria were met for involuntary psychiatric admission:   I[x]I None    I[]I Dangerous to Self    I[]I Dangerous to Others    I[]I Gravely Disabled      EXAMINATION:     VITALS:     Vitals:    09/21/22 0900   BP: (!) 141/76   Pulse: 73   Resp: 20   Temp: 98.2 °F (36.8 °C)       MENTAL STATUS EXAMINATION:   MENTAL STATUS EXAMINATION  General Appearance: appropriately dressed, adequately groomed  Behavior: cooperative, appropriate eye contact, under good behavioral control  Movements and Motor Activity: no abnormal involuntary movements noted, no psychomotor agitation or retardation  Gait and Station: intact, normal gait and station, ambulates without assistance  Speech: normal rate/rhythm/volume/tone, conversational, spontaneous,  "coherent  Language: fluent English, repeats words/phrases, no word-finding difficulties noted  Mood: "good"  Affect: euthymic, reactive, appropriate  Thought Process: linear, goal-directed, organized, logical  Associations: intact, no loosening of associations  Thought Content: no suicidal ideation, no homicidal ideation, no paranoid ideation, no ideas of reference, no evidence of delusions or psychosis  Perceptions: no auditory or visual hallucinations  Sensorium: alert  Orientation: oriented fully to person, place, time, and situation  Recent and Remote Memory: recent memory intact, remote memory intact, no impairments noted  Attention and Concentration: intact, attentive to conversation, not distractible  Fund of Knowledge: intact, aware of current events, vocabulary appropriate  Insight: intact, demonstrates awareness of situation  Judgment: intact, behavior is adequate/appropriate to the circumstances      ASSESSMENT:     III[x]III  DIAGNOSES AND PROBLEMS:             .  Alcohol Use Disorder  Alcoholic Cirrhosis            .  PLAN:     IMPRESSION AND RECOMMENDATIONS:     MANAGEMENT PLAN, TREATMENT GOALS, THERAPEUTIC TECHNIQUES/APPROACHES & CLINICAL REASONING:     Continue acamprosate 666mg TID    TO DO  - find sponsor  - [facility] early to mid October  - will follow up with [facility] for aftercare    Disposition:    - Continue ORP.  - Continue ORP protocol.  - Additional workup is planned to address substance use disorder, in order to guide and refine ongoing management options, including serial alcohol and drug laboratory testing (e.g. POCT breath alcohol test, urine toxicology, alcohol biomarkers, PETH).  - Full engagement in 12 step (or equivalent) recovery program(s), including mandatory meeting attendance and acquisition of sponsor.  - Patient counseled on abstinence from alcohol and substances of abuse (illicit and prescription).  - Relapse prevention and motivational interviewing provided.  - MAT " "for alcohol use disorder was discussed including acamprosate, naltrexone and disulfiram.    III[x]III  PRESCRIPTION DRUG MANAGEMENT:     Prescription Drug Management entails the review, recommendation, or consideration without recommendation of medications, and as such was employed during the encounter.    Discussed, to the extent possible, diagnosis, risks and benefits of proposed treatment vs alternative treatments vs no treatment, potential side effects of these treatments and the inherent unpredictability of treatment. The patient's ability to understand, participate and engage in a conversation surrounding this was deemed to be: adequate. The patient expresses understanding of the above and displays the capacity to agree with this treatment given said understanding. Patient also agrees that, currently, the benefits outweigh the risks and would like to continue treatment at this time.     Written material has additionally been provided, via the AVS or other pre-printed handouts.      MEDICAL DECISION MAKING:     Shared medical decision making and informed consent are the hallmark and bedrock of good clinical care, and as such have been employed and obtained, respectively, to the degree possible.  Written material has been provided to supplement, augment, and reinforce any discussions and interventions, via the AVS or other pre-printed handouts.    Additional psychoeducation has been provided, as warranted.      LEVEL OF MEDICAL DECISION MAKING AND TIME:     Complexity (level) of medical decision making employed in the encounter: MODERATE    The total time for services performed on the date of the encounter: 24 minutes    Portions of this note may have been created with voice recognition software. Occasional "wrong-word" or "sound-a-like" substitutions may have occurred due to the inherent limitations of voice recognition software. Please, read the note carefully and recognize, using context, where substitutions " have occurred.    Arthur Otto, PhD, MD  House Officer - [facility]  Pager: [phone]  09/21/2022      DATA:     DIAGNOSTIC TESTING:     The chart was reviewed for recent diagnostic procedures and investigations, and pertinent results are noted below.      ADDICTION LABORATORY RESULTS:     Urine Toxicology:  Benzodiazepines (no units)   Date Value   09/19/2022 Negative     Barbiturate Screen, Ur (no units)   Date Value   09/19/2022 Negative     Opiate Scrn, Ur (no units)   Date Value   09/19/2022 Negative     Methadone metabolites (no units)   Date Value   09/19/2022 Negative     BUPRENORPHINE (no units)   Date Value   08/02/2022 Not Detected     FENTANYL (no units)   Date Value   08/02/2022 Not Detected     OXYCODONE (no units)   Date Value   08/02/2022 Not Detected     Cocaine (Metab.) (no units)   Date Value   09/19/2022 Negative     Amphetamine Screen, Ur (no units)   Date Value   09/19/2022 Negative     THC (no units)   Date Value   09/19/2022 Negative     Phencyclidine (no units)   Date Value   09/19/2022 Negative       Alcohol:  PEth 16:0/18.1 (POPEth) (ng/mL)   Date Value   09/14/2022 684   08/15/2022 <10   07/18/2022 135   06/30/2022 663     Alcohol, Serum (mg/dL)   Date Value   07/18/2022 <10     Alcohol, Urine (mg/dL)   Date Value   09/19/2022 <10     Ethyl Glucuronide (ng/mL)   Date Value   09/19/2022 Negative     Ethyl Sulfate (ng/mL)   Date Value   09/14/2022 1841     Lab Results   Component Value Date     (H) 08/15/2022     (H) 08/15/2022    AST 42 (H) 09/14/2022    ALT 25 09/14/2022    GGT 81 (H) 07/21/2022    AMMONIA 89 (H) 08/15/2022       Miscellaneous:  No results found for: ALCOHOL, LABBENZ, BARBITURATE, LABMETH, AMPHETAMINE, THCMARIJUANA, LABPHEN, BUPRENORPH, NORBUPRENOR, NICOTINESERU, COTININESERU

## 2022-09-22 ENCOUNTER — HOSPITAL ENCOUNTER (OUTPATIENT)
Dept: PSYCHIATRY | Facility: HOSPITAL | Age: 47
Discharge: HOME | End: 2022-09-22
Attending: PSYCHIATRY & NEUROLOGY
Payer: MEDICARE

## 2022-09-22 DIAGNOSIS — F10.20 ALCOHOL USE DISORDER, SEVERE, DEPENDENCE: Primary | ICD-10-CM

## 2022-09-22 PROCEDURE — 90853 GROUP PSYCHOTHERAPY: CPT

## 2022-09-22 PROCEDURE — 90853 GROUP PSYCHOTHERAPY: CPT | Performed by: SOCIAL WORKER

## 2022-09-22 PROCEDURE — 90853 PR GROUP PSYCHOTHERAPY: ICD-10-PCS | Mod: ,,, | Performed by: PSYCHOLOGIST

## 2022-09-22 PROCEDURE — 90853 GROUP PSYCHOTHERAPY: CPT | Mod: ,,, | Performed by: PSYCHOLOGIST

## 2022-09-22 NOTE — PROGRESS NOTES
Group Psychotherapy (PhD/LCSW)    Site: [facility]    Clinical status of patient: Intensive Outpatient Program (IOP)    Date: 9/22/2022    Group Focus: Emotion Regulation    Length of service: 04548 - 45-50 minutes    Number of patients in attendance: 8    Referred by: [facility] Treatment Team    Target symptoms: Substance Abuse, Depression and Anxiety    Patient's response to treatment: Active Listening and Self-disclosure    Progress toward goals: Progressing adequately    Interval History: Session focus was Emotion Regulation:  Problem-Solving.  Patients were encouraged to identify problems, check the facts, generate solutions, look at pros/cons, and create action steps to use the solution.    Diagnosis:     ICD-10-CM ICD-9-CM   1. Alcohol use disorder, severe, dependence  F10.20 303.90       Plan: Continue treatment on ORP

## 2022-09-22 NOTE — PROGRESS NOTES
Group Psychotherapy (PhD/LCSW)    Site: [facility]    Clinical status of patient: Intensive Outpatient Program (IOP)    Date: 9/22/2022    Group Focus: Psychodynamic Group Psychotherapy    Length of service: 53592 - 45-50 minutes    Number of patients in attendance: 4    Referred by: [facility] Behavior [facility] Treatment Team    Target symptoms: Alcohol Abuse    Patient's response to treatment: Active Listening and Self-disclosure    Progress toward goals: Progressing adequately    Interval History: Pt reports continued sobriety. He discussed showing up for his son yesterday at school, and his anger at the situation. He got support from his peers.    Diagnosis: Alcohol Use Disorder    Plan: Continue treatment on ABU

## 2022-09-22 NOTE — PROGRESS NOTES
"Site: [facility]     Clinical status of patient: Intensive Outpatient Program (IOP)     Date: 09/22/2022      Length of service:  30 minutes     Number of sessions with patient: 1     Referred by: Add[facility] Behavior Unit Treatment Team     Target symptoms: Intake session     Patient's response to treatment: Actively participating and self-disclosing, receptive to treatment plan     Identified goals: Emotion regulation to improve the quality of relationships with his family.     Interval History: Pt expressed difficulties expressing his emotions to his family and cited a fear of that causing a strain on the relationship with his sons. He reported having six children between the ages of 30 and 17. Due to his three daughters being older, he stated that they have seen a "kinder" version of him as his drinking did not increase until they were older and his sons were born. 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     Diagnosis:    Alcohol Use Disorder     Plan: Resume sessions next week to work on emotion regulation.  "

## 2022-09-23 ENCOUNTER — HOSPITAL ENCOUNTER (OUTPATIENT)
Dept: PSYCHIATRY | Facility: HOSPITAL | Age: 47
Discharge: HOME | End: 2022-09-23
Attending: PSYCHIATRY & NEUROLOGY
Payer: MEDICARE

## 2022-09-23 VITALS
TEMPERATURE: 98 F | HEART RATE: 77 BPM | DIASTOLIC BLOOD PRESSURE: 68 MMHG | RESPIRATION RATE: 18 BRPM | SYSTOLIC BLOOD PRESSURE: 137 MMHG

## 2022-09-23 DIAGNOSIS — Z79.899 LONG-TERM USE OF HIGH-RISK MEDICATION: ICD-10-CM

## 2022-09-23 DIAGNOSIS — F10.20 ALCOHOL USE DISORDER, SEVERE, DEPENDENCE: Primary | Chronic | ICD-10-CM

## 2022-09-23 DIAGNOSIS — K70.30 ALCOHOLIC CIRRHOSIS, UNSPECIFIED WHETHER ASCITES PRESENT: ICD-10-CM

## 2022-09-23 DIAGNOSIS — F10.20 ALCOHOL USE DISORDER, SEVERE, DEPENDENCE: Primary | ICD-10-CM

## 2022-09-23 LAB
AMPHET+METHAMPHET UR QL: NEGATIVE
BARBITURATES UR QL SCN>200 NG/ML: NEGATIVE
BENZODIAZ UR QL SCN>200 NG/ML: NEGATIVE
BZE UR QL SCN: NEGATIVE
CANNABINOIDS UR QL SCN: NEGATIVE
CREAT UR-MCNC: 146 MG/DL (ref 23–375)
ETHANOL UR-MCNC: <10 MG/DL
ETHYL GLUCURONIDE: NEGATIVE NG/ML
METHADONE UR QL SCN>300 NG/ML: NEGATIVE
OPIATES UR QL SCN: NEGATIVE
PCP UR QL SCN>25 NG/ML: NEGATIVE
TOXICOLOGY INFORMATION: NORMAL

## 2022-09-23 PROCEDURE — 99232 PR SUBSEQUENT HOSPITAL CARE,LEVL II: ICD-10-PCS | Mod: 25,,, | Performed by: PSYCHIATRY & NEUROLOGY

## 2022-09-23 PROCEDURE — 90853 GROUP PSYCHOTHERAPY: CPT

## 2022-09-23 PROCEDURE — 90853 PR GROUP PSYCHOTHERAPY: ICD-10-PCS | Mod: ,,, | Performed by: PSYCHOLOGIST

## 2022-09-23 PROCEDURE — 90853 GROUP PSYCHOTHERAPY: CPT | Performed by: SOCIAL WORKER

## 2022-09-23 PROCEDURE — 80307 DRUG TEST PRSMV CHEM ANLYZR: CPT | Performed by: PSYCHIATRY & NEUROLOGY

## 2022-09-23 PROCEDURE — 90853 GROUP PSYCHOTHERAPY: CPT | Mod: ,,, | Performed by: PSYCHOLOGIST

## 2022-09-23 PROCEDURE — 99232 SBSQ HOSP IP/OBS MODERATE 35: CPT | Mod: 25,,, | Performed by: PSYCHIATRY & NEUROLOGY

## 2022-09-23 NOTE — PROGRESS NOTES
[facility]  DEPARTMENT OF PSYCHIATRY    SUBSEQUENT VISIT  [facility]    KEY:     I[]I Y = II[x][]II = Yes / Present / Present Though Denies / Endorses  I[]I N = II[][x]II = No / Absent / Absent Though Endorses / Denies  I[]I U = II[][]II = Unknown / Unable to Assess/Enact / Unwilling to Participate/Provide  I[]I A = II[x][x]II = Ambiguity / Uncertainty of Accuracy Exists  I[]I D = Denial or Minimization is Suspected/Evident  I[]I N/A = Non-Applicable    CHIEF COMPLAINT:     Patient Name: Irvin Diaz Jr.  YOB: 1975  MRN: [id number]    Irvin Diaz Jr. is a 47 y.o. male who is being seen by me for a follow up visit.  Irvin Diaz Jr. presents with the chief complaint of: problematic substance use/abuse and alcohol and/or drug addiction    CHART REVIEW:     Available documentation has been reviewed, and pertinent elements of the chart - including previous psychiatric evaluations - have been incorporated into this evaluation where appropriate.    PRESENTATION:     HPI, PSYCHIATRIC ROS & APPLICABLE MEDICAL ROS:   .  Pt doing well today and states that the program has been helping him.  Pthas been going to meetings virtually and will try to go inperson this weekend. Pt wishes to do this so he can find a sponsor.   Pt states that his mood is good.  Pt sleep improved and sates that he is now having issues with waking up needing to use the bathroom. Discussed and pt possibly dehydrated throughout day and drinks were when he gets home. Pt thinks it may just need time and does not wish to start any sleep meds right now.  Pt appetite good.  Pt denies any cravings and states that these are well controled on the current meds. Pt would like to continue meds as is. Pt denies any ADEs.  Rest of ROS grossly negative except for pain in huis hip that pt was able to schedule an appouintment with his Ortho MD and will see him sometime next week.  Pt denies any further  "complaints.    CURRENT PSYCHOTROPIC REGIMEN:     Acamprosate - 666mg TID    HISTORY:     PFSH: The patient's past psychiatric, family, social and medical history have been reviewed and updated as appropriate within the electronic medical record system.            The electronic chart was reviewed and updated as appropriate.  See [employer] for details.           RISK:     III[x]III  RISK PARAMETERS:     The following risk parameters were assessed during this evaluation:    I[]I Y  I[x]I N  I[]I U  I[]I A  Suicidal Ideation/Behavior: **   I[]I Y  I[x]I N  I[]I U  I[]I A  Homicidal Ideation/Behavior: **  I[]I Y  I[x]I N  I[]I U  I[]I A  Violence: **  I[]I Y  I[x]I N  I[]I U  I[]I A  Self-Injurious Behavior: **    I[]I Y  I[x]I N  I[]I U  I[]I A  I[]I N/A  Minimization of Symptoms Suspected/Evident: **  I[]I Y  I[x]I N  I[]I U  I[]I A  I[]I N/A  Exaggeration of Symptoms Suspected/Evident: **      III[x]III  CLINICAL RISK DETERMINATION:     Current risk is judged to be:  I[x]I Low    I[]I Moderate   I[]I High    The following criteria were met for involuntary psychiatric admission:   I[x]I None    I[]I Dangerous to Self    I[]I Dangerous to Others    I[]I Gravely Disabled      EXAMINATION:     VITALS:     Vitals:    09/23/22 0900   BP: 137/68   Pulse: 77   Resp: 18   Temp: 98.1 °F (36.7 °C)       MENTAL STATUS EXAMINATION:   MENTAL STATUS EXAMINATION  General Appearance: appropriately dressed, adequately groomed  Behavior: cooperative, appropriate eye contact, under good behavioral control  Movements and Motor Activity: no abnormal involuntary movements noted, no psychomotor agitation or retardation  Gait and Station: intact, normal gait and station, ambulates without assistance  Speech: normal rate/rhythm/volume/tone, conversational, spontaneous, coherent  Language: fluent English, repeats words/phrases, no word-finding difficulties noted  Mood: "fine"  Affect: euthymic, reactive, appropriate  Thought Process: linear, " goal-directed, organized, logical  Associations: intact, no loosening of associations  Thought Content: no suicidal ideation, no homicidal ideation, no paranoid ideation, no ideas of reference, no evidence of delusions or psychosis  Perceptions: no auditory or visual hallucinations  Sensorium: alert  Orientation: oriented fully to person, place, time, and situation  Recent and Remote Memory: recent memory intact, remote memory intact, no impairments noted  Attention and Concentration: intact, attentive to conversation, not distractible  Fund of Knowledge: intact, aware of current events, vocabulary appropriate  Insight: intact, demonstrates awareness of situation  Judgment: intact, behavior is adequate/appropriate to the circumstances      ASSESSMENT:     III[x]III  DIAGNOSES AND PROBLEMS:             .  Alcohol Use Disorder  Alcoholic Cirrhosis            .  PLAN:     IMPRESSION AND RECOMMENDATIONS:     MANAGEMENT PLAN, TREATMENT GOALS, THERAPEUTIC TECHNIQUES/APPROACHES & CLINICAL REASONING:     Continue acamprosate 666mg TID    TO DO  - find sponsor  - [facility] early to mid October  - will follow up with [facility] for aftercare    Disposition:    - Continue ORP.  - Continue ORP protocol.  - Additional workup is planned to address substance use disorder, in order to guide and refine ongoing management options, including serial alcohol and drug laboratory testing (e.g. POCT breath alcohol test, urine toxicology, alcohol biomarkers, PETH).  - Full engagement in 12 step (or equivalent) recovery program(s), including mandatory meeting attendance and acquisition of sponsor.  - Patient counseled on abstinence from alcohol and substances of abuse (illicit and prescription).  - Relapse prevention and motivational interviewing provided.  - MAT for alcohol use disorder was discussed including acamprosate, naltrexone and disulfiram.    III[x]III  PRESCRIPTION DRUG MANAGEMENT:     Prescription Drug Management entails  "the review, recommendation, or consideration without recommendation of medications, and as such was employed during the encounter.    Discussed, to the extent possible, diagnosis, risks and benefits of proposed treatment vs alternative treatments vs no treatment, potential side effects of these treatments and the inherent unpredictability of treatment. The patient's ability to understand, participate and engage in a conversation surrounding this was deemed to be: adequate. The patient expresses understanding of the above and displays the capacity to agree with this treatment given said understanding. Patient also agrees that, currently, the benefits outweigh the risks and would like to continue treatment at this time.     Written material has additionally been provided, via the AVS or other pre-printed handouts.      MEDICAL DECISION MAKING:     Shared medical decision making and informed consent are the hallmark and bedrock of good clinical care, and as such have been employed and obtained, respectively, to the degree possible.  Written material has been provided to supplement, augment, and reinforce any discussions and interventions, via the AVS or other pre-printed handouts.    Additional psychoeducation has been provided, as warranted.      LEVEL OF MEDICAL DECISION MAKING AND TIME:     Complexity (level) of medical decision making employed in the encounter: MODERATE    The total time for services performed on the date of the encounter: 24 minutes    Portions of this note may have been created with voice recognition software. Occasional "wrong-word" or "sound-a-like" substitutions may have occurred due to the inherent limitations of voice recognition software. Please, read the note carefully and recognize, using context, where substitutions have occurred.    Arthur Otto, PhD, MD  House Officer - [facility]  Pager: [phone]  09/23/2022      DATA:     DIAGNOSTIC TESTING:     The chart was " reviewed for recent diagnostic procedures and investigations, and pertinent results are noted below.      ADDICTION LABORATORY RESULTS:     Urine Toxicology:  Benzodiazepines (no units)   Date Value   09/21/2022 Negative     Barbiturate Screen, Ur (no units)   Date Value   09/21/2022 Negative     Opiate Scrn, Ur (no units)   Date Value   09/21/2022 Negative     Methadone metabolites (no units)   Date Value   09/21/2022 Negative     BUPRENORPHINE (no units)   Date Value   08/02/2022 Not Detected     FENTANYL (no units)   Date Value   08/02/2022 Not Detected     OXYCODONE (no units)   Date Value   08/02/2022 Not Detected     Cocaine (Metab.) (no units)   Date Value   09/21/2022 Negative     Amphetamine Screen, Ur (no units)   Date Value   09/21/2022 Negative     THC (no units)   Date Value   09/21/2022 Negative     Phencyclidine (no units)   Date Value   09/21/2022 Negative       Alcohol:  PEth 16:0/18.1 (POPEth) (ng/mL)   Date Value   09/14/2022 684   08/15/2022 <10   07/18/2022 135   06/30/2022 663     Alcohol, Serum (mg/dL)   Date Value   07/18/2022 <10     Alcohol, Urine (mg/dL)   Date Value   09/21/2022 <10     Ethyl Glucuronide (ng/mL)   Date Value   09/21/2022 Negative     Ethyl Sulfate (ng/mL)   Date Value   09/14/2022 1841     Lab Results   Component Value Date     (H) 08/15/2022     (H) 08/15/2022    AST 42 (H) 09/14/2022    ALT 25 09/14/2022    GGT 81 (H) 07/21/2022    AMMONIA 89 (H) 08/15/2022       Miscellaneous:  No results found for: ALCOHOL, LABBENZ, BARBITURATE, LABMETH, AMPHETAMINE, THCMARIJUANA, LABPHEN, BUPRENORPH, NORBUPRENOR, NICOTINESERU, COTININESERU

## 2022-09-23 NOTE — PLAN OF CARE
09/23/22 1400   Activity/Group Therapy Checklist   Group Relapse Prevention   Attendance Attended   Follows Direction Followed directions   Group Interactions/Observations Interacted appropriately   Affect/Mood Range Normal range   Affect/Mood Display Appropriate   Goal Progression Progressing

## 2022-09-23 NOTE — PROGRESS NOTES
Group Psychotherapy (PhD/LCSW)    Site: [facility]    Clinical status of patient: Intensive Outpatient Program (IOP)    Date: 9/23/2022    Group Focus: Psychodynamic Group Psychotherapy    Length of service: 20245 - 45-50 minutes    Number of patients in attendance: 5    Referred by: Addictive Behavior Unit Treatment Team    Target symptoms: Alcohol Abuse    Patient's response to treatment: Active Listening and Self-disclosure    Progress toward goals: Progressing adequately    Interval History: Pt reports continued sobriety. He engaged in weekend planning discussion. He discussed his decision to go back to the place he used to drink yesterday.    Diagnosis: Alcohol Use Disorder    Plan: Continue treatment on ABU

## 2022-09-23 NOTE — PLAN OF CARE
09/23/22 1059   Activity/Group Therapy Checklist   Group Other (Comments)  (Self-Care)   Attendance Attended   Follows Direction Followed directions   Group Interactions/Observations Interacted appropriately;Sharing;Supportive   Affect/Mood Range Normal range   Affect/Mood Display Appropriate   Goal Progression Progressing        and group reviewed and completed Self-Care Wheel. Patient denied any questions or concerns at this time.

## 2022-09-23 NOTE — PLAN OF CARE
09/22/22 1400   Activity/Group Therapy Checklist   Group Goals/Reflection   Attendance Attended   Follows Direction Followed directions   Group Interactions/Observations Interacted appropriately   Affect/Mood Range Normal range   Affect/Mood Display Appropriate   Goal Progression Progressing

## 2022-09-26 ENCOUNTER — HOSPITAL ENCOUNTER (OUTPATIENT)
Dept: PSYCHIATRY | Facility: HOSPITAL | Age: 47
Discharge: HOME | End: 2022-09-26
Attending: PSYCHIATRY & NEUROLOGY
Payer: MEDICARE

## 2022-09-26 VITALS
RESPIRATION RATE: 18 BRPM | HEART RATE: 76 BPM | DIASTOLIC BLOOD PRESSURE: 94 MMHG | TEMPERATURE: 98 F | SYSTOLIC BLOOD PRESSURE: 170 MMHG

## 2022-09-26 DIAGNOSIS — F10.20 ALCOHOL USE DISORDER, SEVERE, DEPENDENCE: Primary | ICD-10-CM

## 2022-09-26 DIAGNOSIS — F10.20 ALCOHOL USE DISORDER, SEVERE, DEPENDENCE: Primary | Chronic | ICD-10-CM

## 2022-09-26 DIAGNOSIS — Z79.899 LONG-TERM USE OF HIGH-RISK MEDICATION: ICD-10-CM

## 2022-09-26 DIAGNOSIS — Z79.899 LONG-TERM USE OF HIGH-RISK MEDICATION: Primary | ICD-10-CM

## 2022-09-26 DIAGNOSIS — F10.20 ALCOHOL USE DISORDER, SEVERE, DEPENDENCE: ICD-10-CM

## 2022-09-26 DIAGNOSIS — K70.30 ALCOHOLIC CIRRHOSIS, UNSPECIFIED WHETHER ASCITES PRESENT: ICD-10-CM

## 2022-09-26 LAB
AMPHET+METHAMPHET UR QL: NEGATIVE
BARBITURATES UR QL SCN>200 NG/ML: NEGATIVE
BENZODIAZ UR QL SCN>200 NG/ML: NEGATIVE
BZE UR QL SCN: NEGATIVE
CANNABINOIDS UR QL SCN: NEGATIVE
CREAT UR-MCNC: 241 MG/DL (ref 23–375)
ETHANOL UR-MCNC: <10 MG/DL
ETHYL GLUCURONIDE: NEGATIVE NG/ML
METHADONE UR QL SCN>300 NG/ML: NEGATIVE
OPIATES UR QL SCN: NEGATIVE
PCP UR QL SCN>25 NG/ML: NEGATIVE
TOXICOLOGY INFORMATION: NORMAL

## 2022-09-26 PROCEDURE — 90853 GROUP PSYCHOTHERAPY: CPT

## 2022-09-26 PROCEDURE — 99232 SBSQ HOSP IP/OBS MODERATE 35: CPT | Mod: 25,,, | Performed by: PSYCHIATRY & NEUROLOGY

## 2022-09-26 PROCEDURE — 80307 DRUG TEST PRSMV CHEM ANLYZR: CPT | Performed by: PSYCHIATRY & NEUROLOGY

## 2022-09-26 PROCEDURE — 90853 PR GROUP PSYCHOTHERAPY: ICD-10-PCS | Mod: ,,, | Performed by: PSYCHOLOGIST

## 2022-09-26 PROCEDURE — 90853 GROUP PSYCHOTHERAPY: CPT | Performed by: SOCIAL WORKER

## 2022-09-26 PROCEDURE — 99232 PR SUBSEQUENT HOSPITAL CARE,LEVL II: ICD-10-PCS | Mod: 25,,, | Performed by: PSYCHIATRY & NEUROLOGY

## 2022-09-26 PROCEDURE — 90853 GROUP PSYCHOTHERAPY: CPT | Mod: ,,, | Performed by: PSYCHOLOGIST

## 2022-09-26 RX ORDER — ACAMPROSATE CALCIUM 333 MG/1
666 TABLET, DELAYED RELEASE ORAL 3 TIMES DAILY
Qty: 180 TABLET | Refills: 11 | Status: SHIPPED | OUTPATIENT
Start: 2022-09-26 | End: 2023-09-26

## 2022-09-26 NOTE — PROGRESS NOTES
[facility]  DEPARTMENT OF PSYCHIATRY    SUBSEQUENT VISIT  [facility]    KEY:     I[]I Y = II[x][]II = Yes / Present / Present Though Denies / Endorses  I[]I N = II[][x]II = No / Absent / Absent Though Endorses / Denies  I[]I U = II[][]II = Unknown / Unable to Assess/Enact / Unwilling to Participate/Provide  I[]I A = II[x][x]II = Ambiguity / Uncertainty of Accuracy Exists  I[]I D = Denial or Minimization is Suspected/Evident  I[]I N/A = Non-Applicable    CHIEF COMPLAINT:     Patient Name: Irvin Diaz Jr.  YOB: 1975  MRN: [id number]    Irvin Diaz Jr. is a 47 y.o. male who is being seen by me for a follow up visit.  Irvin Diaz Jr. presents with the chief complaint of: problematic substance use/abuse and alcohol and/or drug addiction    CHART REVIEW:     Available documentation has been reviewed, and pertinent elements of the chart - including previous psychiatric evaluations - have been incorporated into this evaluation where appropriate.    PRESENTATION:     HPI, PSYCHIATRIC ROS & APPLICABLE MEDICAL ROS:   .  Pt doing well today and pt has been utilizing his coping skills  Pt had a good weekend and did not do much besides watching the football. Pt states that this was a trigger in the past and he had no issues with cravings/urges this weekend. Pt thinks medication is working as he has not felt like drinking at all.  Pt has been going to meetings virtually and went to an in person meeting on Sunday. Pt found it interesting and wants to go to more. Pt is still looking for a sponsor.   Pt states that his mood is good and everything is going well.  Pt sleep and appetite improved.  Pt denies any cravings and states that these are well controled on the current meds. Pt would like to continue meds as is. Pt denies any ADEs.  Rest of ROS grossly negative except for pain in huis hip that pt is trying to see Ortho for. Pt frustrated as Ortho tells him to see PCP while  PCP tells him to see Ortho. Providers at [facility] and pt will continue to call them.  Pt denies any further psychiatritc or physical complaints.    CURRENT PSYCHOTROPIC REGIMEN:     Acamprosate - 666mg TID    HISTORY:     PFSH: The patient's past psychiatric, family, social and medical history have been reviewed and updated as appropriate within the electronic medical record system.            The electronic chart was reviewed and updated as appropriate.  See [employer] for details.           RISK:     III[x]III  RISK PARAMETERS:     The following risk parameters were assessed during this evaluation:    I[]I Y  I[x]I N  I[]I U  I[]I A  Suicidal Ideation/Behavior: **   I[]I Y  I[x]I N  I[]I U  I[]I A  Homicidal Ideation/Behavior: **  I[]I Y  I[x]I N  I[]I U  I[]I A  Violence: **  I[]I Y  I[x]I N  I[]I U  I[]I A  Self-Injurious Behavior: **    I[]I Y  I[x]I N  I[]I U  I[]I A  I[]I N/A  Minimization of Symptoms Suspected/Evident: **  I[]I Y  I[x]I N  I[]I U  I[]I A  I[]I N/A  Exaggeration of Symptoms Suspected/Evident: **      III[x]III  CLINICAL RISK DETERMINATION:     Current risk is judged to be:  I[x]I Low    I[]I Moderate   I[]I High    The following criteria were met for involuntary psychiatric admission:   I[x]I None    I[]I Dangerous to Self    I[]I Dangerous to Others    I[]I Gravely Disabled      EXAMINATION:     VITALS:     Vitals:    09/26/22 0900   BP: (!) 170/94   Pulse: 76   Resp: 18   Temp: 98.4 °F (36.9 °C)       MENTAL STATUS EXAMINATION:   MENTAL STATUS EXAMINATION  General Appearance: appropriately dressed, adequately groomed  Behavior: cooperative, appropriate eye contact, under good behavioral control  Movements and Motor Activity: no abnormal involuntary movements noted, no psychomotor agitation or retardation  Gait and Station: intact, normal gait and station, ambulates without assistance  Speech: normal rate/rhythm/volume/tone, conversational, spontaneous, coherent  Language: fluent English, repeats  "words/phrases, no word-finding difficulties noted  Mood: "good"  Affect: euthymic, reactive, appropriate  Thought Process: linear, goal-directed, organized, logical  Associations: intact, no loosening of associations  Thought Content: no suicidal ideation, no homicidal ideation, no paranoid ideation, no ideas of reference, no evidence of delusions or psychosis  Perceptions: no auditory or visual hallucinations  Sensorium: alert  Orientation: oriented fully to person, place, time, and situation  Recent and Remote Memory: recent memory intact, remote memory intact, no impairments noted  Attention and Concentration: intact, attentive to conversation, not distractible  Fund of Knowledge: intact, aware of current events, vocabulary appropriate  Insight: intact, demonstrates awareness of situation  Judgment: intact, behavior is adequate/appropriate to the circumstances      ASSESSMENT:     III[x]III  DIAGNOSES AND PROBLEMS:             .  Alcohol Use Disorder  Alcoholic Cirrhosis            .  PLAN:     IMPRESSION AND RECOMMENDATIONS:     MANAGEMENT PLAN, TREATMENT GOALS, THERAPEUTIC TECHNIQUES/APPROACHES & CLINICAL REASONING:     Continue acamprosate 666mg TID    TO DO  - find sponsor  - [facility] early to mid October  - will follow up with [facility] for aftercare    Disposition:    - Continue ORP.  - Continue ORP protocol.  - Additional workup is planned to address substance use disorder, in order to guide and refine ongoing management options, including serial alcohol and drug laboratory testing (e.g. POCT breath alcohol test, urine toxicology, alcohol biomarkers, PETH).  - Full engagement in 12 step (or equivalent) recovery program(s), including mandatory meeting attendance and acquisition of sponsor.  - Patient counseled on abstinence from alcohol and substances of abuse (illicit and prescription).  - Relapse prevention and motivational interviewing provided.  - MAT for alcohol use disorder was discussed " "including acamprosate, naltrexone and disulfiram.    III[x]III  PRESCRIPTION DRUG MANAGEMENT:     Prescription Drug Management entails the review, recommendation, or consideration without recommendation of medications, and as such was employed during the encounter.    Discussed, to the extent possible, diagnosis, risks and benefits of proposed treatment vs alternative treatments vs no treatment, potential side effects of these treatments and the inherent unpredictability of treatment. The patient's ability to understand, participate and engage in a conversation surrounding this was deemed to be: adequate. The patient expresses understanding of the above and displays the capacity to agree with this treatment given said understanding. Patient also agrees that, currently, the benefits outweigh the risks and would like to continue treatment at this time.     Written material has additionally been provided, via the AVS or other pre-printed handouts.      MEDICAL DECISION MAKING:     Shared medical decision making and informed consent are the hallmark and bedrock of good clinical care, and as such have been employed and obtained, respectively, to the degree possible.  Written material has been provided to supplement, augment, and reinforce any discussions and interventions, via the AVS or other pre-printed handouts.    Additional psychoeducation has been provided, as warranted.      LEVEL OF MEDICAL DECISION MAKING AND TIME:     Complexity (level) of medical decision making employed in the encounter: MODERATE    The total time for services performed on the date of the encounter: 24 minutes    Portions of this note may have been created with voice recognition software. Occasional "wrong-word" or "sound-a-like" substitutions may have occurred due to the inherent limitations of voice recognition software. Please, read the note carefully and recognize, using context, where substitutions have occurred.    Arthur Otto, PhD, " MD  House Officer - [facility]  Pager: [phone]  09/26/2022      DATA:     DIAGNOSTIC TESTING:     The chart was reviewed for recent diagnostic procedures and investigations, and pertinent results are noted below.      ADDICTION LABORATORY RESULTS:     Urine Toxicology:  Benzodiazepines (no units)   Date Value   09/23/2022 Negative     Barbiturate Screen, Ur (no units)   Date Value   09/23/2022 Negative     Opiate Scrn, Ur (no units)   Date Value   09/23/2022 Negative     Methadone metabolites (no units)   Date Value   09/23/2022 Negative     BUPRENORPHINE (no units)   Date Value   08/02/2022 Not Detected     FENTANYL (no units)   Date Value   08/02/2022 Not Detected     OXYCODONE (no units)   Date Value   08/02/2022 Not Detected     Cocaine (Metab.) (no units)   Date Value   09/23/2022 Negative     Amphetamine Screen, Ur (no units)   Date Value   09/23/2022 Negative     THC (no units)   Date Value   09/23/2022 Negative     Phencyclidine (no units)   Date Value   09/23/2022 Negative       Alcohol:  PEth 16:0/18.1 (POPEth) (ng/mL)   Date Value   09/14/2022 684   08/15/2022 <10   07/18/2022 135   06/30/2022 663     Alcohol, Serum (mg/dL)   Date Value   07/18/2022 <10     Alcohol, Urine (mg/dL)   Date Value   09/23/2022 <10     Ethyl Glucuronide (ng/mL)   Date Value   09/21/2022 Negative     Ethyl Sulfate (ng/mL)   Date Value   09/14/2022 1841     Lab Results   Component Value Date     (H) 08/15/2022     (H) 08/15/2022    AST 42 (H) 09/14/2022    ALT 25 09/14/2022    GGT 81 (H) 07/21/2022    AMMONIA 89 (H) 08/15/2022       Miscellaneous:  No results found for: ALCOHOL, LABBENZ, BARBITURATE, LABMETH, AMPHETAMINE, THCMARIJUANA, LABPHEN, BUPRENORPH, NORBUPRENOR, NICOTINESERU, COTININESERU

## 2022-09-26 NOTE — PLAN OF CARE
Per team, patient lethargic and a bit confused today.  I spoke with parents, who also noticed a slight change this morning.  I messaged PCP and hepatologist to alert them.  Father states they will call PCP to see if there are any instructions.

## 2022-09-26 NOTE — PROGRESS NOTES
Group Psychotherapy (with Psychology Intern)    Site: [facility]    Clinical status of patient: Intensive Outpatient Program (IOP)    Date: 9/26/2022    Group Focus: Psychodynamic Group Psychotherapy    Length of service: 45-50 minutes    Number of patients in attendance: 3    Referred by: Addictive Behavior Unit Treatment Team    Target symptoms: Alcohol Abuse    Patient's response to treatment: Active Listening and Self-disclosure, provided feedback to another patient.    Progress toward goals: Progressing adequately    Interval History: Pt reports continued sobriety. He discussed his weekend and progress towards goals. Patient reported feeling drowsy throughout the day.    Diagnosis: Alcohol Use Disorder    Plan: Continue treatment on ABU

## 2022-09-26 NOTE — PROGRESS NOTES
Group Psychotherapy (PhD/LCSW)    Site: [facility] ([place], [place])    Clinical status of patient: Intensive Outpatient Program (IOP)    Date: 09/26/2022    Group Focus: ACT- Values    Length of service: 74606 - 45-50 minutes    Number of patients in attendance: 6    Referred by: [facility] Treatment Team    Target symptoms: Depression, anxiety    Patient's response to treatment: Active Listening and Self-disclosure    Progress toward goals: Progressing appropriately    Interval History: Session focus was Values Clarification.  Reviewed the distinction between values and goals. Discussed the definition of values as being freely chosen, controllable, and action based. Patients were led in a guided meditation then asked to briefly journal about what they noticed in the activity. Patients then discussed their values in certain life domains, their importance rating, and how their behavior in the past month has or has not been consistent with their values.     Diagnosis:     ICD-10-CM ICD-9-CM   1. Alcohol use disorder, severe, dependence  F10.20 303.90   2. Alcoholic cirrhosis, unspecified whether ascites present  K70.30 571.2           Plan: Continue treatment on ORP

## 2022-09-26 NOTE — PLAN OF CARE
09/26/22 1400   Activity/Group Therapy Checklist   Group Addiction Education   Attendance Attended   Group Interactions/Observations Drowsy

## 2022-09-26 NOTE — PLAN OF CARE
"   09/26/22 1203   Activity/Group Therapy Checklist   Group Meditation/Relaxation   Attendance Attended   Follows Direction Followed directions   Group Interactions/Observations Interacted appropriately;Alert;Sharing;Supportive   Affect/Mood Range Normal range   Affect/Mood Display Appropriate   Goal Progression Progressing     Discussed mindfulness as a practice, the definition of mindfulness, and various research evidence to strengthen benefits of mindfulness. Engaged in "thought bubble" meditation where members were invited to witness body sensations, breath, and thoughts coming and going without attaching onto them.    "

## 2022-09-27 ENCOUNTER — TELEPHONE (OUTPATIENT)
Dept: PRIMARY CARE CLINIC | Facility: CLINIC | Age: 47
End: 2022-09-27
Payer: MEDICARE

## 2022-09-27 ENCOUNTER — PATIENT MESSAGE (OUTPATIENT)
Dept: PRIMARY CARE CLINIC | Facility: CLINIC | Age: 47
End: 2022-09-27
Payer: MEDICARE

## 2022-09-27 ENCOUNTER — HOSPITAL ENCOUNTER (OUTPATIENT)
Dept: PSYCHIATRY | Facility: HOSPITAL | Age: 47
Discharge: HOME | End: 2022-09-27
Attending: PSYCHIATRY & NEUROLOGY
Payer: MEDICARE

## 2022-09-27 DIAGNOSIS — F10.20 ALCOHOL USE DISORDER, SEVERE, DEPENDENCE: ICD-10-CM

## 2022-09-27 DIAGNOSIS — Z79.899 LONG-TERM USE OF HIGH-RISK MEDICATION: Primary | ICD-10-CM

## 2022-09-27 PROCEDURE — 99233 SBSQ HOSP IP/OBS HIGH 50: CPT | Mod: ,,, | Performed by: PSYCHIATRY & NEUROLOGY

## 2022-09-27 PROCEDURE — 90853 GROUP PSYCHOTHERAPY: CPT

## 2022-09-27 PROCEDURE — 99233 PR SUBSEQUENT HOSPITAL CARE,LEVL III: ICD-10-PCS | Mod: ,,, | Performed by: PSYCHIATRY & NEUROLOGY

## 2022-09-27 RX ORDER — SUCRALFATE 1 G/1
1 TABLET ORAL 4 TIMES DAILY
Qty: 120 TABLET | Refills: 0 | Status: SHIPPED | OUTPATIENT
Start: 2022-09-27 | End: 2022-10-28

## 2022-09-27 NOTE — PLAN OF CARE
09/27/22 1200   Activity/Group Therapy Checklist   Group Other (Comments)  (Strengths Spotting)   Attendance Attended   Follows Direction Followed directions   Group Interactions/Observations Interacted appropriately;Alert;Sharing;Supportive   Affect/Mood Range Normal range   Affect/Mood Display Appropriate   Goal Progression Progressing      facilitated group and discussed with patients a strengths spotting exercise called: Three good people. SW discussed identifying strengths and changing a negative mindset to a positive one. Patients used strength list to identify three people: a fictional character, an inspiring person, and self.

## 2022-09-27 NOTE — PROGRESS NOTES
"Group Psychotherapy (with Psychology Intern)    Site: [facility]    Clinical status of patient: Intensive Outpatient Program (IOP)    Date: 9/27/2022    Group Focus: Psychodynamic Group Psychotherapy    Length of service: 45-50 minutes    Number of patients in attendance: 3    Referred by: Addictive Behavior Unit Treatment Team    Target symptoms: Alcohol Abuse    Patient's response to treatment: Active Listening and Self-disclosure, provided feedback to another patient.    Progress toward goals: Progressing adequately    Interval History: Pt reports continued sobriety. He discussed feeling frustrated that his family and doctor were notified about his drowsiness the previous day and that he believes himself to be "under a microscope" in this program.Group members worked to reframe some of his concerns.    Diagnosis: Alcohol Use Disorder    Plan: Continue treatment on ABU        "

## 2022-09-27 NOTE — TELEPHONE ENCOUNTER
----- Message from Edouard Gibson MD sent at 9/27/2022  4:47 PM CDT -----  Please reach out to patient to schedule closer follow-up visit for overall health evaluation    Edouard Gibson MD

## 2022-09-27 NOTE — PROGRESS NOTES
Group Psychotherapy (PhD/LCSW)     Site: [facility] [address]     Clinical status of patient: Intensive Outpatient Program (IOP)     Date: 9/27/2022     Group Focus: Interpersonal Effectiveness Skills     Length of service: 73676 - 50 minutes     Number of patients in attendance: 6     Referred by: [facility] Treatment Team     Target symptoms: Alcohol Use     Patient's response to treatment: Active Listening and Self-disclosure     Progress toward goals: Progressing adequately     Interval History: Session focus was Interpersonal Effectiveness: GIVE FAST.  Patients learned relationship effectiveness skills of being Gentle, acting Interested, validating, using an Easy manner, being Fair, no Apologizing, Sticking to values, and being Truthful.     Diagnosis:     ICD-10-CM ICD-9-CM   1. Long-term use of high-risk medication  Z79.899 V58.69   2. Alcohol use disorder, severe, dependence  F10.20 303.90       Plan: Continue treatment on ORP.

## 2022-09-27 NOTE — PROGRESS NOTES
[facility]  DEPARTMENT OF PSYCHIATRY    SUBSEQUENT VISIT  [facility]    KEY:     I[]I Y = II[x][]II = Yes / Present / Present Though Denies / Endorses  I[]I N = II[][x]II = No / Absent / Absent Though Endorses / Denies  I[]I U = II[][]II = Unknown / Unable to Assess/Enact / Unwilling to Participate/Provide  I[]I A = II[x][x]II = Ambiguity / Uncertainty of Accuracy Exists  I[]I D = Denial or Minimization is Suspected/Evident  I[]I N/A = Non-Applicable    CHIEF COMPLAINT:     Patient Name: Irvin Diaz Jr.  YOB: 1975  MRN: [id number]    Irvin Diaz Jr. is a 47 y.o. male who is being seen by me for a follow up visit.  Irvin Diaz Jr. presents with the chief complaint of: problematic substance use/abuse and alcohol and/or drug addiction    CHART REVIEW:     Available documentation has been reviewed, and pertinent elements of the chart - including previous psychiatric evaluations - have been incorporated into this evaluation where appropriate.    PRESENTATION:     HPI, PSYCHIATRIC ROS & APPLICABLE MEDICAL ROS:   .  Pt doing well today and pt has been utilizing his coping skills  Majority of discussion around tiredness yesterday. Pt states that he did not sleep well Sunday and that's why he was lethargic. Pt states that family asked him about that as well. Pt states he became frustrated because everyone kept asking if he was okay. Pt feels like he can never have a bad day. Discussed and pt trying to understand that people do this because they are concenred especially given his liver. Pt re-iterates that he is fine and does not have any issues related to disorientation, confusion, halluciantions like last time.  Pt has been going to meetings and is still looking for a sponsor.   Pt denies any cravings and states that these are well controled on the current meds. Pt would like to continue meds as is. Pt denies any ADEs.  Rest of ROS grossly negative.  Pt denies any  "further psychiatritc or physical complaints.    CURRENT PSYCHOTROPIC REGIMEN:     Acamprosate - 666mg TID    HISTORY:     PFSH: The patient's past psychiatric, family, social and medical history have been reviewed and updated as appropriate within the electronic medical record system.            The electronic chart was reviewed and updated as appropriate.  See [place] for details.           RISK:     III[x]III  RISK PARAMETERS:     The following risk parameters were assessed during this evaluation:    I[]I Y  I[x]I N  I[]I U  I[]I A  Suicidal Ideation/Behavior: **   I[]I Y  I[x]I N  I[]I U  I[]I A  Homicidal Ideation/Behavior: **  I[]I Y  I[x]I N  I[]I U  I[]I A  Violence: **  I[]I Y  I[x]I N  I[]I U  I[]I A  Self-Injurious Behavior: **    I[]I Y  I[x]I N  I[]I U  I[]I A  I[]I N/A  Minimization of Symptoms Suspected/Evident: **  I[]I Y  I[x]I N  I[]I U  I[]I A  I[]I N/A  Exaggeration of Symptoms Suspected/Evident: **      III[x]III  CLINICAL RISK DETERMINATION:     Current risk is judged to be:  I[x]I Low    I[]I Moderate   I[]I High    The following criteria were met for involuntary psychiatric admission:   I[x]I None    I[]I Dangerous to Self    I[]I Dangerous to Others    I[]I Gravely Disabled      EXAMINATION:     VITALS:     There were no vitals filed for this visit.      MENTAL STATUS EXAMINATION:   MENTAL STATUS EXAMINATION  General Appearance: appropriately dressed, adequately groomed  Behavior: cooperative, appropriate eye contact, under good behavioral control  Movements and Motor Activity: no abnormal involuntary movements noted, no psychomotor agitation or retardation  Gait and Station: intact, normal gait and station, ambulates without assistance  Speech: normal rate/rhythm/volume/tone, conversational, spontaneous, coherent  Language: fluent English, repeats words/phrases, no word-finding difficulties noted  Mood: "fine"  Affect: euthymic, reactive, appropriate  Thought Process: linear, goal-directed, " organized, logical  Associations: intact, no loosening of associations  Thought Content: no suicidal ideation, no homicidal ideation, no paranoid ideation, no ideas of reference, no evidence of delusions or psychosis  Perceptions: no auditory or visual hallucinations  Sensorium: alert  Orientation: oriented fully to person, place, time, and situation  Recent and Remote Memory: recent memory intact, remote memory intact, no impairments noted  Attention and Concentration: intact, attentive to conversation, not distractible  Fund of Knowledge: intact, aware of current events, vocabulary appropriate  Insight: intact, demonstrates awareness of situation  Judgment: intact, behavior is adequate/appropriate to the circumstances      ASSESSMENT:     III[x]III  DIAGNOSES AND PROBLEMS:             .  Alcohol Use Disorder, severe   Alcoholic Cirrhosis            .  PLAN:     IMPRESSION AND RECOMMENDATIONS:     MANAGEMENT PLAN, TREATMENT GOALS, THERAPEUTIC TECHNIQUES/APPROACHES & CLINICAL REASONING:     Continue acamprosate 666mg TID    TO DO  - find sponsor  - [facility] early to mid October  - Pt has follow up appointment with [facility] psych    Disposition:    - Continue ORP.  - Continue ORP protocol.  - Additional workup is planned to address substance use disorder, in order to guide and refine ongoing management options, including serial alcohol and drug laboratory testing (e.g. POCT breath alcohol test, urine toxicology, alcohol biomarkers, PETH).  - Full engagement in 12 step (or equivalent) recovery program(s), including mandatory meeting attendance and acquisition of sponsor.  - Patient counseled on abstinence from alcohol and substances of abuse (illicit and prescription).  - Relapse prevention and motivational interviewing provided.  - MAT for alcohol use disorder was discussed including acamprosate, naltrexone and disulfiram.    III[x]III  PRESCRIPTION DRUG MANAGEMENT:     Prescription Drug Management entails the review,  "recommendation, or consideration without recommendation of medications, and as such was employed during the encounter.    Discussed, to the extent possible, diagnosis, risks and benefits of proposed treatment vs alternative treatments vs no treatment, potential side effects of these treatments and the inherent unpredictability of treatment. The patient's ability to understand, participate and engage in a conversation surrounding this was deemed to be: adequate. The patient expresses understanding of the above and displays the capacity to agree with this treatment given said understanding. Patient also agrees that, currently, the benefits outweigh the risks and would like to continue treatment at this time.     Written material has additionally been provided, via the AVS or other pre-printed handouts.      MEDICAL DECISION MAKING:     Shared medical decision making and informed consent are the hallmark and bedrock of good clinical care, and as such have been employed and obtained, respectively, to the degree possible.  Written material has been provided to supplement, augment, and reinforce any discussions and interventions, via the AVS or other pre-printed handouts.    Additional psychoeducation has been provided, as warranted.      LEVEL OF MEDICAL DECISION MAKING AND TIME:     Complexity (level) of medical decision making employed in the encounter: MODERATE    The total time for services performed on the date of the encounter: 27 minutes    Portions of this note may have been created with voice recognition software. Occasional "wrong-word" or "sound-a-like" substitutions may have occurred due to the inherent limitations of voice recognition software. Please, read the note carefully and recognize, using context, where substitutions have occurred.    Arthur Otto, PhD, MD  House Officer - IV  [facility]  Pager: [phone]  09/27/2022       I , Cezar Hoover MD, have reviewed the attached history and " exam . Pt examined personally by me  on 9-27-22 . The case discussed with the examining psychiatry resident physician . I agree the assessment and recommended treatment plan .     DATA:     DIAGNOSTIC TESTING:     The chart was reviewed for recent diagnostic procedures and investigations, and pertinent results are noted below.      ADDICTION LABORATORY RESULTS:     Urine Toxicology:  Benzodiazepines (no units)   Date Value   09/26/2022 Negative     Barbiturate Screen, Ur (no units)   Date Value   09/26/2022 Negative     Opiate Scrn, Ur (no units)   Date Value   09/26/2022 Negative     Methadone metabolites (no units)   Date Value   09/26/2022 Negative     BUPRENORPHINE (no units)   Date Value   08/02/2022 Not Detected     FENTANYL (no units)   Date Value   08/02/2022 Not Detected     OXYCODONE (no units)   Date Value   08/02/2022 Not Detected     Cocaine (Metab.) (no units)   Date Value   09/26/2022 Negative     Amphetamine Screen, Ur (no units)   Date Value   09/26/2022 Negative     THC (no units)   Date Value   09/26/2022 Negative     Phencyclidine (no units)   Date Value   09/26/2022 Negative       Alcohol:  PEth 16:0/18.1 (POPEth) (ng/mL)   Date Value   09/14/2022 684   08/15/2022 <10   07/18/2022 135   06/30/2022 663     Alcohol, Serum (mg/dL)   Date Value   07/18/2022 <10     Alcohol, Urine (mg/dL)   Date Value   09/26/2022 <10     Ethyl Glucuronide (ng/mL)   Date Value   09/23/2022 Negative     Ethyl Sulfate (ng/mL)   Date Value   09/14/2022 1841     Lab Results   Component Value Date     (H) 08/15/2022     (H) 08/15/2022    AST 42 (H) 09/14/2022    ALT 25 09/14/2022    GGT 81 (H) 07/21/2022    AMMONIA 89 (H) 08/15/2022       Miscellaneous:  No results found for: ALCOHOL, LABBENZ, BARBITURATE, LABMETH, AMPHETAMINE, THCMARIJUANA, LABPHEN, BUPRENORPH, NORBUPRENOR, NICOTINESERU, COTININESERU

## 2022-09-27 NOTE — TELEPHONE ENCOUNTER
----- Message from Cleo Velarde sent at 9/27/2022  3:02 PM CDT -----  Contact: Patient, [phone]  Requesting an RX refill or new RX.  Is this a refill or new RX: Refill  RX name and strength (copy/paste from chart):  sucralfate (CARAFATE) 1 gram tablet  Is this a 30 day or 90 day RX:   Pharmacy name and phone # (copy/paste from chart):    [facility] [address] [address]  Phone: [phone] Fax: [phone]  The doctors have asked that we provide their patients with the following 2 reminders -- prescription refills can take up to 72 hours, and a friendly reminder that in the future you can use your [facility] account to request refills: Yes

## 2022-09-28 ENCOUNTER — HOSPITAL ENCOUNTER (OUTPATIENT)
Dept: PSYCHIATRY | Facility: HOSPITAL | Age: 47
Discharge: HOME | End: 2022-09-28
Attending: PSYCHIATRY & NEUROLOGY
Payer: MEDICARE

## 2022-09-28 VITALS
DIASTOLIC BLOOD PRESSURE: 96 MMHG | HEART RATE: 68 BPM | TEMPERATURE: 98 F | SYSTOLIC BLOOD PRESSURE: 173 MMHG | RESPIRATION RATE: 18 BRPM

## 2022-09-28 DIAGNOSIS — Z79.899 LONG-TERM USE OF HIGH-RISK MEDICATION: ICD-10-CM

## 2022-09-28 DIAGNOSIS — Z79.899 LONG-TERM USE OF HIGH-RISK MEDICATION: Primary | ICD-10-CM

## 2022-09-28 DIAGNOSIS — K70.30 ALCOHOLIC CIRRHOSIS, UNSPECIFIED WHETHER ASCITES PRESENT: ICD-10-CM

## 2022-09-28 DIAGNOSIS — F10.20 ALCOHOL USE DISORDER, SEVERE, DEPENDENCE: Primary | Chronic | ICD-10-CM

## 2022-09-28 LAB
AMPHET+METHAMPHET UR QL: NEGATIVE
BARBITURATES UR QL SCN>200 NG/ML: NEGATIVE
BENZODIAZ UR QL SCN>200 NG/ML: NEGATIVE
BZE UR QL SCN: NEGATIVE
CANNABINOIDS UR QL SCN: NEGATIVE
CREAT UR-MCNC: 201 MG/DL (ref 23–375)
ETHANOL UR-MCNC: <10 MG/DL
ETHYL GLUCURONIDE: NEGATIVE NG/ML
METHADONE UR QL SCN>300 NG/ML: NEGATIVE
OPIATES UR QL SCN: NEGATIVE
PCP UR QL SCN>25 NG/ML: NEGATIVE
TOXICOLOGY INFORMATION: NORMAL

## 2022-09-28 PROCEDURE — 80307 DRUG TEST PRSMV CHEM ANLYZR: CPT | Mod: 59 | Performed by: PSYCHIATRY & NEUROLOGY

## 2022-09-28 PROCEDURE — 99232 SBSQ HOSP IP/OBS MODERATE 35: CPT | Mod: 25,,, | Performed by: PSYCHIATRY & NEUROLOGY

## 2022-09-28 PROCEDURE — 90853 GROUP PSYCHOTHERAPY: CPT

## 2022-09-28 PROCEDURE — 80307 DRUG TEST PRSMV CHEM ANLYZR: CPT | Performed by: PSYCHIATRY & NEUROLOGY

## 2022-09-28 PROCEDURE — 99232 PR SUBSEQUENT HOSPITAL CARE,LEVL II: ICD-10-PCS | Mod: 25,,, | Performed by: PSYCHIATRY & NEUROLOGY

## 2022-09-28 NOTE — PROGRESS NOTES
[facility]  DEPARTMENT OF PSYCHIATRY    SUBSEQUENT VISIT  [facility]    KEY:     I[]I Y = II[x][]II = Yes / Present / Present Though Denies / Endorses  I[]I N = II[][x]II = No / Absent / Absent Though Endorses / Denies  I[]I U = II[][]II = Unknown / Unable to Assess/Enact / Unwilling to Participate/Provide  I[]I A = II[x][x]II = Ambiguity / Uncertainty of Accuracy Exists  I[]I D = Denial or Minimization is Suspected/Evident  I[]I N/A = Non-Applicable    CHIEF COMPLAINT:     Patient Name: Irvin Diaz Jr.  YOB: 1975  MRN: [id number]    Irvin Diaz Jr. is a 47 y.o. male who is being seen by me for a follow up visit.  Irvin Diaz Jr. presents with the chief complaint of: problematic substance use/abuse and alcohol and/or drug addiction    CHART REVIEW:     Available documentation has been reviewed, and pertinent elements of the chart - including previous psychiatric evaluations - have been incorporated into this evaluation where appropriate.    PRESENTATION:     HPI, PSYCHIATRIC ROS & APPLICABLE MEDICAL ROS:   .  Pt doing okay today. Is coping with everyone asking him about how he is feeling in terms of being tired. Pt feels like he is under a microscope at times. Discussed frustrations and pt trying to re-frame the situation though is finding this dificult.  Pt has been going to meetings and is still looking for a sponsor.   Pt denies any cravings and states that these are well controled on the current meds. Pt would like to continue meds as is. Pt denies any ADEs.  Rest of ROS grossly negative except for complaint of itching. Pt wants to know if there is something he can take. Dsicussed benadryl though informed pt that will need to coordinate wioth hepatology to insure this is okay.  Pt denies any further psychiatritc or physical complaints.  Sleep has improved and appetite remains good.    CURRENT PSYCHOTROPIC REGIMEN:     Acamprosate - 666mg TID    HISTORY:  "    PFSH: The patient's past psychiatric, family, social and medical history have been reviewed and updated as appropriate within the electronic medical record system.            The electronic chart was reviewed and updated as appropriate.  See [facility] for details.           RISK:     III[x]III  RISK PARAMETERS:     The following risk parameters were assessed during this evaluation:    I[]I Y  I[x]I N  I[]I U  I[]I A  Suicidal Ideation/Behavior: **   I[]I Y  I[x]I N  I[]I U  I[]I A  Homicidal Ideation/Behavior: **  I[]I Y  I[x]I N  I[]I U  I[]I A  Violence: **  I[]I Y  I[x]I N  I[]I U  I[]I A  Self-Injurious Behavior: **    I[]I Y  I[x]I N  I[]I U  I[]I A  I[]I N/A  Minimization of Symptoms Suspected/Evident: **  I[]I Y  I[x]I N  I[]I U  I[]I A  I[]I N/A  Exaggeration of Symptoms Suspected/Evident: **      III[x]III  CLINICAL RISK DETERMINATION:     Current risk is judged to be:  I[x]I Low    I[]I Moderate   I[]I High    The following criteria were met for involuntary psychiatric admission:   I[x]I None    I[]I Dangerous to Self    I[]I Dangerous to Others    I[]I Gravely Disabled      EXAMINATION:     VITALS:     Vitals:    09/28/22 0900   BP: (!) 173/96   Pulse: 68   Resp: 18   Temp: 98.4 °F (36.9 °C)         MENTAL STATUS EXAMINATION:   MENTAL STATUS EXAMINATION  General Appearance: appropriately dressed, adequately groomed  Behavior: cooperative, appropriate eye contact, under good behavioral control  Movements and Motor Activity: no abnormal involuntary movements noted, no psychomotor agitation or retardation  Gait and Station: intact, normal gait and station, ambulates without assistance  Speech: normal rate/rhythm/volume/tone, conversational, spontaneous, coherent  Language: fluent English, repeats words/phrases, no word-finding difficulties noted  Mood: "okay"  Affect: euthymic, reactive, appropriate  Thought Process: linear, goal-directed, organized, logical  Associations: intact, no loosening of " associations  Thought Content: no suicidal ideation, no homicidal ideation, no paranoid ideation, no ideas of reference, no evidence of delusions or psychosis  Perceptions: no auditory or visual hallucinations  Sensorium: alert  Orientation: oriented fully to person, place, time, and situation  Recent and Remote Memory: recent memory intact, remote memory intact, no impairments noted  Attention and Concentration: intact, attentive to conversation, not distractible  Fund of Knowledge: intact, aware of current events, vocabulary appropriate  Insight: intact, demonstrates awareness of situation  Judgment: intact, behavior is adequate/appropriate to the circumstances      ASSESSMENT:     III[x]III  DIAGNOSES AND PROBLEMS:             .  Alcohol Use Disorder  Alcoholic Cirrhosis            .  PLAN:     IMPRESSION AND RECOMMENDATIONS:     MANAGEMENT PLAN, TREATMENT GOALS, THERAPEUTIC TECHNIQUES/APPROACHES & CLINICAL REASONING:     Continue acamprosate 666mg TID    TO DO  - find sponsor  - PETH ordered today  - Pt has follow up appointment with [facility] rosibel Doyle NP 10/31 at 2pm    Disposition:    - Continue ORP.  - Continue ORP protocol.  - Additional workup is planned to address substance use disorder, in order to guide and refine ongoing management options, including serial alcohol and drug laboratory testing (e.g. POCT breath alcohol test, urine toxicology, alcohol biomarkers, PETH).  - Full engagement in 12 step (or equivalent) recovery program(s), including mandatory meeting attendance and acquisition of sponsor.  - Patient counseled on abstinence from alcohol and substances of abuse (illicit and prescription).  - Relapse prevention and motivational interviewing provided.  - MAT for alcohol use disorder was discussed including acamprosate, naltrexone and disulfiram.    III[x]III  PRESCRIPTION DRUG MANAGEMENT:     Prescription Drug Management entails the review, recommendation, or consideration without recommendation  "of medications, and as such was employed during the encounter.    Discussed, to the extent possible, diagnosis, risks and benefits of proposed treatment vs alternative treatments vs no treatment, potential side effects of these treatments and the inherent unpredictability of treatment. The patient's ability to understand, participate and engage in a conversation surrounding this was deemed to be: adequate. The patient expresses understanding of the above and displays the capacity to agree with this treatment given said understanding. Patient also agrees that, currently, the benefits outweigh the risks and would like to continue treatment at this time.     Written material has additionally been provided, via the AVS or other pre-printed handouts.      MEDICAL DECISION MAKING:     Shared medical decision making and informed consent are the hallmark and bedrock of good clinical care, and as such have been employed and obtained, respectively, to the degree possible.  Written material has been provided to supplement, augment, and reinforce any discussions and interventions, via the AVS or other pre-printed handouts.    Additional psychoeducation has been provided, as warranted.      LEVEL OF MEDICAL DECISION MAKING AND TIME:     Complexity (level) of medical decision making employed in the encounter: MODERATE    The total time for services performed on the date of the encounter: 23 minutes    Portions of this note may have been created with voice recognition software. Occasional "wrong-word" or "sound-a-like" substitutions may have occurred due to the inherent limitations of voice recognition software. Please, read the note carefully and recognize, using context, where substitutions have occurred.    Arthur Otto, PhD, MD  House Officer - [facility]  Pager: [phone]  09/28/2022      DATA:     DIAGNOSTIC TESTING:     The chart was reviewed for recent diagnostic procedures and investigations, and " pertinent results are noted below.      ADDICTION LABORATORY RESULTS:     Urine Toxicology:  Benzodiazepines (no units)   Date Value   09/26/2022 Negative     Barbiturate Screen, Ur (no units)   Date Value   09/26/2022 Negative     Opiate Scrn, Ur (no units)   Date Value   09/26/2022 Negative     Methadone metabolites (no units)   Date Value   09/26/2022 Negative     BUPRENORPHINE (no units)   Date Value   08/02/2022 Not Detected     FENTANYL (no units)   Date Value   08/02/2022 Not Detected     OXYCODONE (no units)   Date Value   08/02/2022 Not Detected     Cocaine (Metab.) (no units)   Date Value   09/26/2022 Negative     Amphetamine Screen, Ur (no units)   Date Value   09/26/2022 Negative     THC (no units)   Date Value   09/26/2022 Negative     Phencyclidine (no units)   Date Value   09/26/2022 Negative       Alcohol:  PEth 16:0/18.1 (POPEth) (ng/mL)   Date Value   09/14/2022 684   08/15/2022 <10   07/18/2022 135   06/30/2022 663     Alcohol, Serum (mg/dL)   Date Value   07/18/2022 <10     Alcohol, Urine (mg/dL)   Date Value   09/26/2022 <10     Ethyl Glucuronide (ng/mL)   Date Value   09/26/2022 Negative     Ethyl Sulfate (ng/mL)   Date Value   09/14/2022 1841     Lab Results   Component Value Date     (H) 08/15/2022     (H) 08/15/2022    AST 42 (H) 09/14/2022    ALT 25 09/14/2022    GGT 81 (H) 07/21/2022    AMMONIA 89 (H) 08/15/2022       Miscellaneous:  No results found for: ALCOHOL, LABBENZ, BARBITURATE, LABMETH, AMPHETAMINE, THCMARIJUANA, LABPHEN, BUPRENORPH, NORBUPRENOR, NICOTINESERU, COTININESERU

## 2022-09-28 NOTE — PROGRESS NOTES
Group Psychotherapy (PhD/LCSW)    Site: [facility]    Clinical status of patient: Intensive Outpatient Program (IOP)    Date: 9/28/2022    Group Focus: Distress Tolerance    Length of service: 85755 - 45-50 minutes    Number of patients in attendance: 10    Referred by: [facility] Treatment Team    Target symptoms: Substance Abuse, Depression and Anxiety    Patient's response to treatment: Active Listening and Self-disclosure    Progress toward goals: Progressing adequately    Interval History: Session focus was Distress Tolerance:  TIP skill.  Patients were encouraged to use temperature, intense exercise, paced breathing, or paired muscle relaxation to reduce intensity of distress.    Diagnosis:     ICD-10-CM ICD-9-CM   1. Alcohol use disorder, severe, dependence  F10.20 303.90   2. Alcoholic cirrhosis, unspecified whether ascites present  K70.30 571.2       Plan: Continue treatment on ORP

## 2022-09-28 NOTE — PLAN OF CARE
09/28/22 1200   Activity/Group Therapy Checklist   Group Other (Comments)  (Lifestory)   Attendance Attended   Follows Direction Followed directions   Group Interactions/Observations Interacted appropriately;Alert;Sharing;Supportive   Affect/Mood Range Normal range   Affect/Mood Display Appropriate   Goal Progression Progressing     Patient was cooperative and supportive during group member's Life Story.

## 2022-09-28 NOTE — PATIENT CARE CONFERENCE
Diagnosis:   Alcohol Use Disorder  Alcoholic Cirrhosis    Progress:  Patient staffed by team. Patient completed family meeting with estranged spouse. Patient has not secured a sponsor yet. Patient to d/c at 30 days of treatment. Team to continue to monitor patient's progress.     Plan of Care:   Patient to continue ORP with group and individual therapies.     Aftercare/followup:   Med Management: 10/31 @2pm with Keshawn Doyle NP  Psychotherapy: TBD    Staff Present:    MD Ajay Bernardo LPN Brigette Escandon, LMSW Bianca Oscar, RSW    Resident: Arthur Otto MD

## 2022-09-29 ENCOUNTER — HOSPITAL ENCOUNTER (OUTPATIENT)
Dept: PSYCHIATRY | Facility: HOSPITAL | Age: 47
Discharge: HOME | End: 2022-09-29
Attending: PSYCHIATRY & NEUROLOGY
Payer: MEDICARE

## 2022-09-29 DIAGNOSIS — K70.30 ALCOHOLIC CIRRHOSIS, UNSPECIFIED WHETHER ASCITES PRESENT: ICD-10-CM

## 2022-09-29 DIAGNOSIS — F10.20 ALCOHOL USE DISORDER, SEVERE, DEPENDENCE: Primary | Chronic | ICD-10-CM

## 2022-09-29 LAB — ETHYL GLUCURONIDE: NEGATIVE NG/ML

## 2022-09-29 PROCEDURE — 99232 SBSQ HOSP IP/OBS MODERATE 35: CPT | Mod: 25,,, | Performed by: PSYCHIATRY & NEUROLOGY

## 2022-09-29 PROCEDURE — 99232 PR SUBSEQUENT HOSPITAL CARE,LEVL II: ICD-10-PCS | Mod: 25,,, | Performed by: PSYCHIATRY & NEUROLOGY

## 2022-09-29 PROCEDURE — 90853 GROUP PSYCHOTHERAPY: CPT | Performed by: SOCIAL WORKER

## 2022-09-29 NOTE — PROGRESS NOTES
[facility]  DEPARTMENT OF PSYCHIATRY    SUBSEQUENT VISIT  [facility]    KEY:     I[]I Y = II[x][]II = Yes / Present / Present Though Denies / Endorses  I[]I N = II[][x]II = No / Absent / Absent Though Endorses / Denies  I[]I U = II[][]II = Unknown / Unable to Assess/Enact / Unwilling to Participate/Provide  I[]I A = II[x][x]II = Ambiguity / Uncertainty of Accuracy Exists  I[]I D = Denial or Minimization is Suspected/Evident  I[]I N/A = Non-Applicable    CHIEF COMPLAINT:     Patient Name: Irvin Diaz Jr.  YOB: 1975  MRN: [id number]    Irvin Diaz Jr. is a 47 y.o. male who is being seen by me for a follow up visit.  Irvin Diaz Jr. presents with the chief complaint of: problematic substance use/abuse and alcohol and/or drug addiction    CHART REVIEW:     Available documentation has been reviewed, and pertinent elements of the chart - including previous psychiatric evaluations - have been incorporated into this evaluation where appropriate.    PRESENTATION:     HPI, PSYCHIATRIC ROS & APPLICABLE MEDICAL ROS:   .  Patient states he is doing well.  He denies any cravings.  We discussed pending PETH test and that we wouldn't expect it to be zero at this point - but that it should be falling.  He remains off any opiates for pain.       .  CURRENT PSYCHOTROPIC REGIMEN:     Acamprosate - 666mg TID  Nortriptyline 25mg bedtime    HISTORY:     PFSH: The patient's past psychiatric, family, social and medical history have been reviewed and updated as appropriate within the electronic medical record system.            The electronic chart was reviewed and updated as appropriate.  See [facility] for details.           RISK:     III[x]III  RISK PARAMETERS:     The following risk parameters were assessed during this evaluation:    I[]I Y  I[x]I N  I[]I U  I[]I A  Suicidal Ideation/Behavior: **   I[]I Y  I[x]I N  I[]I U  I[]I A  Homicidal Ideation/Behavior: **  I[]I Y  I[x]I N  I[]I U  " I[]I A  Violence: **  I[]I Y  I[x]I N  I[]I U  I[]I A  Self-Injurious Behavior: **    I[]I Y  I[x]I N  I[]I U  I[]I A  I[]I N/A  Minimization of Symptoms Suspected/Evident: **  I[]I Y  I[x]I N  I[]I U  I[]I A  I[]I N/A  Exaggeration of Symptoms Suspected/Evident: **      III[x]III  CLINICAL RISK DETERMINATION:     Current risk is judged to be:  I[x]I Low    I[]I Moderate   I[]I High    The following criteria were met for involuntary psychiatric admission:   I[x]I None    I[]I Dangerous to Self    I[]I Dangerous to Others    I[]I Gravely Disabled      EXAMINATION:     VITALS:     There were no vitals filed for this visit.      MENTAL STATUS EXAMINATION:     General Appearance: appropriately dressed, adequately groomed  Behavior: cooperative, appropriate eye contact, under good behavioral control  Movements and Motor Activity: no abnormal involuntary movements noted, no psychomotor agitation or retardation  Gait and Station: utilizes wheel chair for long distances  Speech: normal rate/rhythm/volume/tone, conversational, decreased spontaneity, coherent  Language: fluent English, repeats words/phrases, no word-finding difficulties noted  Mood: "okay"  Affect: subdued, low key, reactive, appropriate  Thought Process: linear, goal-directed, organized, logical  Associations: intact, no loosening of associations  Thought Content: no suicidal ideation, no homicidal ideation, no paranoid ideation, no ideas of reference, no evidence of delusions or psychosis  Perceptions: no auditory or visual hallucinations  Insight: intact, demonstrates awareness of situation  Judgment: intact, behavior is adequate/appropriate to the circumstances      ASSESSMENT:     III[x]III  DIAGNOSES AND PROBLEMS:             .  Alcohol Use Disorder  Alcoholic Cirrhosis            .  PLAN:     IMPRESSION AND RECOMMENDATIONS:     MANAGEMENT PLAN, TREATMENT GOALS, THERAPEUTIC TECHNIQUES/APPROACHES & CLINICAL REASONING:     - continue acamprosate 666mg " TID  - sent message to hepatologist Dr. Veronica about recent labs - CMP and ammonia  - follow up PETH level  - cont nortriptyline - through pain management  - avoid oxycodone at this time - pain management has ok'ed the use of opiates - if pain exacerbated we will revisit if need be - but currently he prefers to avoid - he agrees to speak with us prior to resuming so we can discuss    Disposition:    - Continue ORP.  - Continue ORP protocol.  - Additional workup is planned to address substance use disorder, in order to guide and refine ongoing management options, including serial alcohol and drug laboratory testing (e.g. POCT breath alcohol test, urine toxicology, alcohol biomarkers, PETH).  - Full engagement in 12 step (or equivalent) recovery program(s), including mandatory meeting attendance and acquisition of sponsor.  - Patient counseled on abstinence from alcohol and substances of abuse (illicit and prescription).  - Relapse prevention and motivational interviewing provided.  - MAT for alcohol use disorder was discussed including acamprosate, naltrexone and disulfiram.    III[x]III  PRESCRIPTION DRUG MANAGEMENT:     Prescription Drug Management entails the review, recommendation, or consideration without recommendation of medications, and as such was employed during the encounter.    Discussed, to the extent possible, diagnosis, risks and benefits of proposed treatment vs alternative treatments vs no treatment, potential side effects of these treatments and the inherent unpredictability of treatment. The patient's ability to understand, participate and engage in a conversation surrounding this was deemed to be: adequate. The patient expresses understanding of the above and displays the capacity to agree with this treatment given said understanding. Patient also agrees that, currently, the benefits outweigh the risks and would like to continue treatment at this time.     Written material has additionally been  provided, via the AVS or other pre-printed handouts.      MEDICAL DECISION MAKING:     Shared medical decision making and informed consent are the hallmark and bedrock of good clinical care, and as such have been employed and obtained, respectively, to the degree possible.  Written material has been provided to supplement, augment, and reinforce any discussions and interventions, via the AVS or other pre-printed handouts.    Additional psychoeducation has been provided, as warranted.      LEVEL OF MEDICAL DECISION MAKING AND TIME:     Complexity (level) of medical decision making employed in the encounter: MODERATE    The total time for services performed on the date of the encounter: 15 minutes    Thaddeus Odonnell MD  Department of Psychiatry  [facility]      DATA:     DIAGNOSTIC TESTING:     The chart was reviewed for recent diagnostic procedures and investigations, and pertinent results are noted below.      ADDICTION LABORATORY RESULTS:     Urine Toxicology:  Benzodiazepines (no units)   Date Value   09/19/2022 Negative     Barbiturate Screen, Ur (no units)   Date Value   09/19/2022 Negative     Opiate Scrn, Ur (no units)   Date Value   09/19/2022 Negative     Methadone metabolites (no units)   Date Value   09/19/2022 Negative     BUPRENORPHINE (no units)   Date Value   08/02/2022 Not Detected     FENTANYL (no units)   Date Value   08/02/2022 Not Detected     OXYCODONE (no units)   Date Value   08/02/2022 Not Detected     Cocaine (Metab.) (no units)   Date Value   09/19/2022 Negative     Amphetamine Screen, Ur (no units)   Date Value   09/19/2022 Negative     THC (no units)   Date Value   09/19/2022 Negative     Phencyclidine (no units)   Date Value   09/19/2022 Negative       Alcohol:  PEth 16:0/18.1 (POPEth) (ng/mL)   Date Value   09/14/2022 684   08/15/2022 <10   07/18/2022 135   06/30/2022 663     Alcohol, Serum (mg/dL)   Date Value   07/18/2022 <10     Alcohol, Urine (mg/dL)   Date Value   09/19/2022  <10     Ethyl Glucuronide (ng/mL)   Date Value   09/19/2022 Negative     Ethyl Sulfate (ng/mL)   Date Value   09/12/2022 >23948     Lab Results   Component Value Date     (H) 08/15/2022     (H) 08/15/2022    AST 42 (H) 09/14/2022    ALT 25 09/14/2022    GGT 81 (H) 07/21/2022    AMMONIA 89 (H) 08/15/2022       Miscellaneous:  No results found for: ALCOHOL, LABBENZ, BARBITURATE, LABMETH, AMPHETAMINE, THCMARIJUANA, LABPHEN, BUPRENORPH, NORBUPRENOR, NICOTINESERU, COTININESERU      STAFF NOTE    The patient was seen by me, the chart was reviewed, and the case was discussed with the resident.  I agree with the above assessment and plan.    Family meeting today with ex-wife.      Thaddeus Odonnell MD

## 2022-09-29 NOTE — PROGRESS NOTES
Group Psychotherapy (with Psychology Intern)    Site: [facility]    Clinical status of patient: Intensive Outpatient Program (IOP)    Date: 9/29/2022    Group Focus: Psychodynamic Group Psychotherapy    Length of service: 45-50 minutes    Number of patients in attendance: 3    Referred by: Addictive Behavior Unit Treatment Team    Target symptoms: Alcohol Abuse    Patient's response to treatment: Active Listening and Self-disclosure, provided feedback to another patient.    Progress toward goals: Progressing adequately    Interval History: Pt reports continued sobriety. He discussed his relationships with his family and provided updates on his son's schooling. He also reported deciding against attending the karaoke bar event. Patient denied the presence of cravings or urges.    Diagnosis: Alcohol Use Disorder    Plan: Continue treatment on ABU

## 2022-09-29 NOTE — PROGRESS NOTES
Group Psychotherapy (PhD/LCSW)    Site: [facility]    Clinical status of patient: Intensive Outpatient Program (IOP)    Date: 9/29/2022    Group Focus: Emotion Regulation    Length of service: 98480 - 45-50 minutes    Number of patients in attendance: 8    Referred by: [facility] Treatment Team    Target symptoms: Substance Abuse, Depression and Anxiety    Patient's response to treatment: Active Listening and Self-disclosure    Progress toward goals: Progressing adequately    Interval History: Session focus was Emotion Regulation:  ABC PLEASE.  Patients were encouraged to reduce vulnerability to distress by accumulating positive emotions, building mastery, coping ahead, and by attending to physical well-being.  Each patient identified a way to accumulate positive emotions and build mastery.    Diagnosis:     ICD-10-CM ICD-9-CM   1. Alcohol use disorder, severe, dependence  F10.20 303.90   2. Alcoholic cirrhosis, unspecified whether ascites present  K70.30 571.2       Plan: Continue treatment on ORP

## 2022-09-30 ENCOUNTER — HOSPITAL ENCOUNTER (OUTPATIENT)
Dept: PSYCHIATRY | Facility: HOSPITAL | Age: 47
Discharge: HOME | End: 2022-09-30
Attending: PSYCHIATRY & NEUROLOGY
Payer: MEDICARE

## 2022-09-30 ENCOUNTER — TELEPHONE (OUTPATIENT)
Dept: HEPATOLOGY | Facility: CLINIC | Age: 47
End: 2022-09-30
Payer: MEDICARE

## 2022-09-30 ENCOUNTER — TELEPHONE (OUTPATIENT)
Dept: PHARMACY | Facility: CLINIC | Age: 47
End: 2022-09-30
Payer: MEDICARE

## 2022-09-30 VITALS
SYSTOLIC BLOOD PRESSURE: 156 MMHG | TEMPERATURE: 98 F | HEART RATE: 65 BPM | RESPIRATION RATE: 18 BRPM | DIASTOLIC BLOOD PRESSURE: 83 MMHG

## 2022-09-30 DIAGNOSIS — G93.40 ENCEPHALOPATHY: Primary | ICD-10-CM

## 2022-09-30 DIAGNOSIS — Z79.899 LONG-TERM USE OF HIGH-RISK MEDICATION: ICD-10-CM

## 2022-09-30 DIAGNOSIS — Z79.899 LONG-TERM USE OF HIGH-RISK MEDICATION: Primary | ICD-10-CM

## 2022-09-30 LAB
AMPHET+METHAMPHET UR QL: NEGATIVE
BARBITURATES UR QL SCN>200 NG/ML: NEGATIVE
BENZODIAZ UR QL SCN>200 NG/ML: NEGATIVE
BZE UR QL SCN: NEGATIVE
CANNABINOIDS UR QL SCN: NEGATIVE
CREAT UR-MCNC: 171 MG/DL (ref 23–375)
ETHANOL UR-MCNC: <10 MG/DL
METHADONE UR QL SCN>300 NG/ML: NEGATIVE
OPIATES UR QL SCN: NEGATIVE
PCP UR QL SCN>25 NG/ML: NEGATIVE
TOXICOLOGY INFORMATION: NORMAL

## 2022-09-30 PROCEDURE — 80307 DRUG TEST PRSMV CHEM ANLYZR: CPT | Performed by: PSYCHIATRY & NEUROLOGY

## 2022-09-30 PROCEDURE — 90853 GROUP PSYCHOTHERAPY: CPT

## 2022-09-30 NOTE — PROGRESS NOTES
Group Psychotherapy (with Psychology Intern)    Site: [facility]    Clinical status of patient: Intensive Outpatient Program (IOP)    Date: 9/30/2022    Group Focus: Psychodynamic Group Psychotherapy    Length of service: 45-50 minutes    Number of patients in attendance: 5    Referred by: Add[facility] Behavior [facility] Treatment Team    Target symptoms: Alcohol Abuse    Patient's response to treatment: Active Listening and Self-disclosure, provided feedback to another patient.    Progress toward goals: Progressing adequately    Interval History: Pt reports continued sobriety. He participated in the weekend planning discussion and introduced himself to the new group member.    Diagnosis: Alcohol Use Disorder    Plan: Continue treatment on ABU

## 2022-09-30 NOTE — TELEPHONE ENCOUNTER
Labs:   -  Low K: but due to creat elevation, cannot give much K.  So, please have patient drink some (6-8 oz) OJ every day for 3 days.   -  Ammonia elevated: Please have patient take lactulose 30 mL twice/day.  If he has loose stools during his alcohol rehab sessions, take 30 mL only after he gets home from rehab. Also needs xifaxan 550 mg BID. Placing order. Escript sent to [facility].      MIGUEL ÁNGEL Veronica

## 2022-09-30 NOTE — PROGRESS NOTES
"Group Psychotherapy (with Psychology Intern)    Site: [facility]    Clinical status of patient: Intensive Outpatient Program (IOP)    Date: 9/29/2022    Group Focus: Psychodynamic Group Psychotherapy    Length of service: 45-50 minutes    Number of patients in attendance: 4    Referred by: Addictive Behavior Unit Treatment Team    Target symptoms: Alcohol Abuse    Patient's response to treatment: Active Listening and Self-disclosure, provided feedback to another patient.    Progress toward goals: Progressing adequately    Interval History: Pt reports continued sobriety. He reported an "uneventful" evening. Patient provided updates on his son's expulsion, and gave updates on his elder son's wedding planning.    Diagnosis: Alcohol Use Disorder    Plan: Continue treatment on ABU        "

## 2022-09-30 NOTE — PLAN OF CARE
09/30/22 1341   Activity/Group Therapy Checklist   Group Other (Comments)  (Self Care)   Attendance Attended   Follows Direction Followed directions   Group Interactions/Observations Interacted appropriately;Alert;Sharing;Supportive   Affect/Mood Range Normal range   Affect/Mood Display Appropriate   Goal Progression Progressing      discussed with patients the importance of self care routines each day to help maintain mental health. Patient's created a mental health maintenance plan and identified triggers and symptoms that could be a risk to their mental health. Patient's identified self-care and coping skills that could help to manage symptoms and get back on track.

## 2022-10-01 LAB — ETHYL GLUCURONIDE: NEGATIVE NG/ML

## 2022-10-03 ENCOUNTER — HOSPITAL ENCOUNTER (OUTPATIENT)
Dept: PSYCHIATRY | Facility: HOSPITAL | Age: 47
Discharge: HOME | End: 2022-10-03
Attending: PSYCHIATRY & NEUROLOGY
Payer: MEDICARE

## 2022-10-03 VITALS
RESPIRATION RATE: 18 BRPM | TEMPERATURE: 98 F | SYSTOLIC BLOOD PRESSURE: 175 MMHG | DIASTOLIC BLOOD PRESSURE: 88 MMHG | HEART RATE: 80 BPM

## 2022-10-03 DIAGNOSIS — Z79.899 LONG-TERM USE OF HIGH-RISK MEDICATION: Primary | ICD-10-CM

## 2022-10-03 DIAGNOSIS — Z79.899 LONG-TERM USE OF HIGH-RISK MEDICATION: ICD-10-CM

## 2022-10-03 DIAGNOSIS — F10.20 ALCOHOL USE DISORDER, SEVERE, DEPENDENCE: Primary | ICD-10-CM

## 2022-10-03 LAB
AMPHET+METHAMPHET UR QL: NEGATIVE
BARBITURATES UR QL SCN>200 NG/ML: NEGATIVE
BENZODIAZ UR QL SCN>200 NG/ML: NEGATIVE
BZE UR QL SCN: NEGATIVE
CANNABINOIDS UR QL SCN: NEGATIVE
CREAT UR-MCNC: 202 MG/DL (ref 23–375)
ETHANOL UR-MCNC: <10 MG/DL
METHADONE UR QL SCN>300 NG/ML: NEGATIVE
OPIATES UR QL SCN: NEGATIVE
PCP UR QL SCN>25 NG/ML: NEGATIVE
TOXICOLOGY INFORMATION: NORMAL

## 2022-10-03 PROCEDURE — 99233 PR SUBSEQUENT HOSPITAL CARE,LEVL III: ICD-10-PCS | Mod: ,,, | Performed by: PSYCHIATRY & NEUROLOGY

## 2022-10-03 PROCEDURE — 99233 SBSQ HOSP IP/OBS HIGH 50: CPT | Mod: ,,, | Performed by: PSYCHIATRY & NEUROLOGY

## 2022-10-03 PROCEDURE — 80307 DRUG TEST PRSMV CHEM ANLYZR: CPT | Performed by: PSYCHIATRY & NEUROLOGY

## 2022-10-03 PROCEDURE — 90853 PR GROUP PSYCHOTHERAPY: ICD-10-PCS | Mod: ,,, | Performed by: PSYCHOLOGIST

## 2022-10-03 PROCEDURE — 90853 GROUP PSYCHOTHERAPY: CPT | Mod: ,,, | Performed by: PSYCHOLOGIST

## 2022-10-03 PROCEDURE — 90853 GROUP PSYCHOTHERAPY: CPT

## 2022-10-03 NOTE — PLAN OF CARE
"   10/03/22 1510   Activity/Group Therapy Checklist   Group Meditation/Relaxation   Attendance Attended   Follows Direction Followed directions   Group Interactions/Observations Interacted appropriately;Alert;Sharing;Supportive   Affect/Mood Range Normal range   Affect/Mood Display Appropriate   Goal Progression Progressing     Discussed mindfulness as a practice, the definition of mindfulness, and various research evidence to strengthen benefits of mindfulness. Engaged in "thought bubble" meditation where members were invited to witness body sensations, breath, and thoughts coming and going without attaching onto them.    "

## 2022-10-03 NOTE — PLAN OF CARE
09/29/22 1400   Activity/Group Therapy Checklist   Group Relapse Prevention   Attendance Attended   Follows Direction Followed directions   Group Interactions/Observations Interacted appropriately   Affect/Mood Range Normal range   Affect/Mood Display Appropriate   Goal Progression Progressing

## 2022-10-03 NOTE — PROGRESS NOTES
[facility]  DEPARTMENT OF PSYCHIATRY    SUBSEQUENT VISIT  [facility]    KEY:     I[]I Y = II[x][]II = Yes / Present / Present Though Denies / Endorses  I[]I N = II[][x]II = No / Absent / Absent Though Endorses / Denies  I[]I U = II[][]II = Unknown / Unable to Assess/Enact / Unwilling to Participate/Provide  I[]I A = II[x][x]II = Ambiguity / Uncertainty of Accuracy Exists  I[]I D = Denial or Minimization is Suspected/Evident  I[]I N/A = Non-Applicable    CHIEF COMPLAINT:     Patient Name: Irvin Diaz Jr.  YOB: 1975  MRN: [id number]    Irvin Diaz Jr. is a 47 y.o. male who is being seen by me for a follow up visit.  Irvin Diaz Jr. presents with the chief complaint of: problematic substance use/abuse and alcohol and/or drug addiction    CHART REVIEW:     Available documentation has been reviewed, and pertinent elements of the chart - including previous psychiatric evaluations - have been incorporated into this evaluation where appropriate.    PRESENTATION:     HPI, PSYCHIATRIC ROS & APPLICABLE MEDICAL ROS:   .  Patient states he is doing well.  He denies any cravings.  We discussed results of PETH test, showing that his levels are coming down and is consistent with his abstinence. Also shows negative ETG as well.    Patient denies depression, hopelessness, SI. Reports family continues to be a positive and motivating factor for him. Denies other issues or questions. Continues to go to meetings and look for suitable sponsor.     .  CURRENT PSYCHOTROPIC REGIMEN:     Acamprosate - 666mg TID  Nortriptyline 25mg bedtime    HISTORY:     PFSH: The patient's past psychiatric, family, social and medical history have been reviewed and updated as appropriate within the electronic medical record system.            The electronic chart was reviewed and updated as appropriate.  See Medcard for details.           RISK:     III[x]III  RISK PARAMETERS:     The following risk  "parameters were assessed during this evaluation:    I[]I Y  I[x]I N  I[]I U  I[]I A  Suicidal Ideation/Behavior: **   I[]I Y  I[x]I N  I[]I U  I[]I A  Homicidal Ideation/Behavior: **  I[]I Y  I[x]I N  I[]I U  I[]I A  Violence: **  I[]I Y  I[x]I N  I[]I U  I[]I A  Self-Injurious Behavior: **    I[]I Y  I[x]I N  I[]I U  I[]I A  I[]I N/A  Minimization of Symptoms Suspected/Evident: **  I[]I Y  I[x]I N  I[]I U  I[]I A  I[]I N/A  Exaggeration of Symptoms Suspected/Evident: **      III[x]III  CLINICAL RISK DETERMINATION:     Current risk is judged to be:  I[x]I Low    I[]I Moderate   I[]I High    The following criteria were met for involuntary psychiatric admission:   I[x]I None    I[]I Dangerous to Self    I[]I Dangerous to Others    I[]I Gravely Disabled      EXAMINATION:     VITALS:     There were no vitals filed for this visit.      MENTAL STATUS EXAMINATION:     General Appearance: appropriately dressed, adequately groomed  Behavior: cooperative, appropriate eye contact, under good behavioral control  Movements and Motor Activity: no abnormal involuntary movements noted, no psychomotor agitation or retardation  Gait and Station: utilizes wheel chair for long distances  Speech: normal rate/rhythm/volume/tone, conversational, decreased spontaneity, coherent  Language: fluent English, repeats words/phrases, no word-finding difficulties noted  Mood: "okay"  Affect: subdued, low key, reactive, appropriate  Thought Process: linear, goal-directed, organized, logical  Associations: intact, no loosening of associations  Thought Content: no suicidal ideation, no homicidal ideation, no paranoid ideation, no ideas of reference, no evidence of delusions or psychosis  Perceptions: no auditory or visual hallucinations  Insight: intact, demonstrates awareness of situation  Judgment: intact, behavior is adequate/appropriate to the circumstances      ASSESSMENT:     III[x]III  DIAGNOSES AND PROBLEMS:             .  Alcohol Use " Disorder, moderate   Alcoholic Cirrhosis            .  PLAN:     IMPRESSION AND RECOMMENDATIONS:     MANAGEMENT PLAN, TREATMENT GOALS, THERAPEUTIC TECHNIQUES/APPROACHES & CLINICAL REASONING:     - continue acamprosate 666mg TID  - sent message to hepatologist Dr. Veronica about recent labs - CMP and ammonia  - follow up PETH level  - cont nortriptyline - through pain management  -continue lactulose, rifaximin for ammonia - informed of need to take as prescribed and not to miss dosages due to risk of elevated ammonia being neurotoxic and can cause encephalopathy, confusion   - avoid oxycodone at this time - pain management has ok'ed the use of opiates - if pain exacerbated we will revisit if need be - but currently he prefers to avoid - he agrees to speak with us prior to resuming so we can discuss    Disposition:    - Continue ORP.  - Continue ORP protocol.  - Additional workup is planned to address substance use disorder, in order to guide and refine ongoing management options, including serial alcohol and drug laboratory testing (e.g. POCT breath alcohol test, urine toxicology, alcohol biomarkers, PETH).  - Full engagement in 12 step (or equivalent) recovery program(s), including mandatory meeting attendance and acquisition of sponsor.  - Patient counseled on abstinence from alcohol and substances of abuse (illicit and prescription).  - Relapse prevention and motivational interviewing provided.  - MAT for alcohol use disorder was discussed including acamprosate, naltrexone and disulfiram.    III[x]III  PRESCRIPTION DRUG MANAGEMENT:     Prescription Drug Management entails the review, recommendation, or consideration without recommendation of medications, and as such was employed during the encounter.    Discussed, to the extent possible, diagnosis, risks and benefits of proposed treatment vs alternative treatments vs no treatment, potential side effects of these treatments and the inherent unpredictability of  treatment. The patient's ability to understand, participate and engage in a conversation surrounding this was deemed to be: adequate. The patient expresses understanding of the above and displays the capacity to agree with this treatment given said understanding. Patient also agrees that, currently, the benefits outweigh the risks and would like to continue treatment at this time.     Written material has additionally been provided, via the AVS or other pre-printed handouts.      MEDICAL DECISION MAKING:     Shared medical decision making and informed consent are the hallmark and bedrock of good clinical care, and as such have been employed and obtained, respectively, to the degree possible.  Written material has been provided to supplement, augment, and reinforce any discussions and interventions, via the AVS or other pre-printed handouts.    Additional psychoeducation has been provided, as warranted.      LEVEL OF MEDICAL DECISION MAKING AND TIME:     Complexity (level) of medical decision making employed in the encounter: MODERATE    The total time for services performed on the date of the encounter: 30 minutes    Case discussed with staff Dr. Hickman, MD Ross Wiedemann, MD  [facility] PGY-4  [facility] Ethan Tejada I , Cezar Sneha ALLAN, have reviewed the attached history and exam . Pt examined personally by me on 10-3-22 . The case discussed with the examining psychiatry resident physician . I agree the assessment and recommended treatment plan .         DATA:     DIAGNOSTIC TESTING:     The chart was reviewed for recent diagnostic procedures and investigations, and pertinent results are noted below.      ADDICTION LABORATORY RESULTS:     Urine Toxicology:  Benzodiazepines (no units)   Date Value   09/19/2022 Negative     Barbiturate Screen, Ur (no units)   Date Value   09/19/2022 Negative     Opiate Scrn, Ur (no units)   Date Value   09/19/2022 Negative     Methadone metabolites  (no units)   Date Value   09/19/2022 Negative     BUPRENORPHINE (no units)   Date Value   08/02/2022 Not Detected     FENTANYL (no units)   Date Value   08/02/2022 Not Detected     OXYCODONE (no units)   Date Value   08/02/2022 Not Detected     Cocaine (Metab.) (no units)   Date Value   09/19/2022 Negative     Amphetamine Screen, Ur (no units)   Date Value   09/19/2022 Negative     THC (no units)   Date Value   09/19/2022 Negative     Phencyclidine (no units)   Date Value   09/19/2022 Negative       Alcohol:  PEth 16:0/18.1 (POPEth) (ng/mL)   Date Value   09/14/2022 684   08/15/2022 <10   07/18/2022 135   06/30/2022 663     Alcohol, Serum (mg/dL)   Date Value   07/18/2022 <10     Alcohol, Urine (mg/dL)   Date Value   09/19/2022 <10     Ethyl Glucuronide (ng/mL)   Date Value   09/19/2022 Negative     Ethyl Sulfate (ng/mL)   Date Value   09/12/2022 >03990     Lab Results   Component Value Date     (H) 08/15/2022     (H) 08/15/2022    AST 42 (H) 09/14/2022    ALT 25 09/14/2022    GGT 81 (H) 07/21/2022    AMMONIA 89 (H) 08/15/2022       Miscellaneous:  No results found for: ALCOHOL, LABBENZ, BARBITURATE, LABMETH, AMPHETAMINE, THCMARIJUANA, LABPHEN, BUPRENORPH, NORBUPRENOR, NICOTINESERU, COTININESERU

## 2022-10-03 NOTE — PROGRESS NOTES
Group Psychotherapy (PhD/LCSW)    Site: [facility] ([place], [place])    Clinical status of patient: Intensive Outpatient Program (IOP)    Date: 10/3/2022    Group Focus: ACT Skills    Length of service: 07743 - 45-50 minutes    Number of patients in attendance: 10    Referred by: [facility] Treatment Team    Target symptoms: Depression, Anxiety    Patient's response to treatment: Active Listening and Self-disclosure    Progress toward goals: Progressing adequately    Interval History: Session focus was the ACT skill of Defusion or gaining space between one's thoughts and one's identity. Patients participated in a discussion regarding the controllability of thoughts and the distinction between looking from one's thoughts versus looking at one's thoughts. Patients were then led in several experiential exercises (thoughts on a card, word repetition, thinking statements) demonstrating defusion    Diagnosis:     ICD-10-CM ICD-9-CM   1. Alcohol use disorder, severe, dependence  F10.20 303.90       Plan: Continue treatment on ORP

## 2022-10-03 NOTE — TELEPHONE ENCOUNTER
I spoke with patient's father.  I told him, per Dr Veronica, patient needs   Labs:   -  Low K: but due to creat elevation, cannot give much K.  So, please have patient drink some (6-8 oz) OJ every day for 3 days.   -  Ammonia elevated: Please have patient take lactulose 30 mL twice/day.  If he has loose stools during his alcohol rehab sessions, take 30 mL only after he gets home from rehab.   Also needs xifaxan 550 mg BID. Placing order. Escript sent to [facility].        Mr Macias Sr verbalized understanding.

## 2022-10-04 ENCOUNTER — OFFICE VISIT (OUTPATIENT)
Dept: PRIMARY CARE CLINIC | Facility: CLINIC | Age: 47
End: 2022-10-04
Payer: MEDICARE

## 2022-10-04 VITALS
RESPIRATION RATE: 20 BRPM | BODY MASS INDEX: 26.58 KG/M2 | SYSTOLIC BLOOD PRESSURE: 136 MMHG | OXYGEN SATURATION: 100 % | WEIGHT: 196 LBS | HEART RATE: 77 BPM | DIASTOLIC BLOOD PRESSURE: 70 MMHG

## 2022-10-04 DIAGNOSIS — M87.00 AVN (AVASCULAR NECROSIS OF BONE): Primary | ICD-10-CM

## 2022-10-04 DIAGNOSIS — Z74.09 LIMITED MOBILITY: ICD-10-CM

## 2022-10-04 DIAGNOSIS — L70.0 ACNE VULGARIS: ICD-10-CM

## 2022-10-04 DIAGNOSIS — K70.30 ALCOHOLIC CIRRHOSIS OF LIVER WITHOUT ASCITES: ICD-10-CM

## 2022-10-04 PROCEDURE — 99214 PR OFFICE/OUTPT VISIT, EST, LEVL IV, 30-39 MIN: ICD-10-PCS | Mod: S$PBB,,, | Performed by: STUDENT IN AN ORGANIZED HEALTH CARE EDUCATION/TRAINING PROGRAM

## 2022-10-04 PROCEDURE — 99214 OFFICE O/P EST MOD 30 MIN: CPT | Mod: S$PBB,,, | Performed by: STUDENT IN AN ORGANIZED HEALTH CARE EDUCATION/TRAINING PROGRAM

## 2022-10-04 PROCEDURE — 99999 PR PBB SHADOW E&M-EST. PATIENT-LVL V: ICD-10-PCS | Mod: PBBFAC,,, | Performed by: STUDENT IN AN ORGANIZED HEALTH CARE EDUCATION/TRAINING PROGRAM

## 2022-10-04 PROCEDURE — 99999 PR PBB SHADOW E&M-EST. PATIENT-LVL V: CPT | Mod: PBBFAC,,, | Performed by: STUDENT IN AN ORGANIZED HEALTH CARE EDUCATION/TRAINING PROGRAM

## 2022-10-04 PROCEDURE — 99215 OFFICE O/P EST HI 40 MIN: CPT | Mod: PBBFAC,PN | Performed by: STUDENT IN AN ORGANIZED HEALTH CARE EDUCATION/TRAINING PROGRAM

## 2022-10-04 RX ORDER — BENZOYL PEROXIDE 100 MG/ML
LIQUID TOPICAL 2 TIMES DAILY
Qty: 148 G | Refills: 1 | Status: SHIPPED | OUTPATIENT
Start: 2022-10-04 | End: 2022-10-04

## 2022-10-04 RX ORDER — CLINDAMYCIN AND BENZOYL PEROXIDE 10; 50 MG/G; MG/G
GEL TOPICAL 2 TIMES DAILY
Qty: 50 G | Refills: 1 | Status: SHIPPED | OUTPATIENT
Start: 2022-10-04 | End: 2023-01-22 | Stop reason: SDUPTHER

## 2022-10-04 NOTE — PROGRESS NOTES
Subjective:       Patient ID: Irvin Diaz Jr. is a 47 y.o. male.    Chief Complaint: Rash (Rash on forehead/)    HPI:  47 y.o. male presents to [facility] here for follow-up    Patient has stage 6 avascular necrosis and alcohol related cirrhosis on liver transplant list. Currently in rehab follow-up program. Will meet with transplant board with liver specialist and transplant specialist 10/25/2022.    Today patient reports no acute concerns at this time.    Nortriptyline is helping with sleep if used consistently.    Not taking oxycodone for pain because medication made him feel weird.    On Xifaxin at this time for ammonia levels.    Review of Systems   Constitutional:  Negative for chills, diaphoresis, fatigue and fever.   HENT:  Negative for congestion, sinus pressure, sneezing and sore throat.    Respiratory:  Negative for cough and shortness of breath.    Cardiovascular:  Negative for chest pain and palpitations.   Gastrointestinal:  Negative for abdominal pain, diarrhea, nausea and vomiting.   Musculoskeletal:  Positive for arthralgias (left hip). Negative for joint swelling and myalgias.   Skin:  Positive for rash (forehead. Worsened since last seen in clinic). Negative for wound.   Neurological:  Positive for weakness (In hip with pain). Negative for dizziness, light-headedness, numbness and headaches.   Psychiatric/Behavioral:  Positive for sleep disturbance (Patient reports sporadic sleep with onging liver disease. Duration varies a lot. No relief on melatonin.). Negative for confusion (Improved on Xifaxin) and hallucinations.      Objective:      Vitals:    10/04/22 1152   BP: 136/70   BP Location: Right arm   Patient Position: Sitting   BP Method: Medium (Manual)   Pulse: 77   Resp: 20   SpO2: 100%   Weight: 88.9 kg (195 lb 15.8 oz)     Physical Exam  Vitals reviewed.   Constitutional:       General: He is not in acute distress.     Appearance: Normal appearance. He is not ill-appearing.   HENT:       Head: Normocephalic and atraumatic.   Eyes:      General:         Right eye: No discharge.         Left eye: No discharge.      Conjunctiva/sclera: Conjunctivae normal.   Cardiovascular:      Rate and Rhythm: Normal rate and regular rhythm.      Pulses: Normal pulses.      Heart sounds: No murmur heard.  Pulmonary:      Effort: Pulmonary effort is normal.      Breath sounds: Normal breath sounds.   Musculoskeletal:         General: No deformity.      Cervical back: Neck supple. No rigidity.   Skin:     General: Skin is warm and dry.      Coloration: Skin is not jaundiced.      Comments: Numerous closed comedones to forehead only   Neurological:      General: No focal deficit present.      Mental Status: He is alert and oriented to person, place, and time.   Psychiatric:         Mood and Affect: Mood normal.         Behavior: Behavior normal.           Lab Results   Component Value Date     09/28/2022     09/02/2018    K 3.4 (L) 09/28/2022     09/28/2022     09/02/2018    CO2 22 (L) 09/28/2022    BUN 20 09/28/2022    CREATININE 1.7 (H) 09/28/2022    CREATININE 0.91 09/02/2018    ANIONGAP 9 09/28/2022     Lab Results   Component Value Date    HGBA1C 5.7 11/12/2020     Lab Results   Component Value Date     (H) 07/18/2022     (H) 03/07/2022     (H) 07/21/2021       Lab Results   Component Value Date    WBC 4.80 08/15/2022    WBC 4.80 08/15/2022    HGB 9.5 (L) 08/15/2022    HGB 9.5 (L) 08/15/2022    HCT 27.9 (L) 08/15/2022    HCT 27.9 (L) 08/15/2022    HCT 29 (L) 01/02/2021    PLT 82 (L) 08/15/2022    PLT 82 (L) 08/15/2022    GRAN 2.8 08/15/2022    GRAN 59.0 08/15/2022    GRAN 2.8 08/15/2022    GRAN 59.0 08/15/2022     Lab Results   Component Value Date    CHOL 146 08/15/2022    HDL 30 (L) 08/15/2022    LDLCALC 98.0 08/15/2022    TRIG 90 08/15/2022          Current Outpatient Medications:     acamprosate (CAMPRAL) 333 mg tablet, Take 2 tablets (666 mg total) by mouth 3  (three) times daily., Disp: 180 tablet, Rfl: 11    carvediloL (COREG) 12.5 MG tablet, Take 1 tablet (12.5 mg total) by mouth 2 (two) times daily with meals., Disp: 60 tablet, Rfl: 11    ergocalciferol (ERGOCALCIFEROL) 50,000 unit Cap, 1 tablet, Disp: , Rfl:     famotidine (PEPCID) 20 MG tablet, 1 tablet at bedtime as needed, Disp: , Rfl:     ferrous gluconate (FERGON) 240 (27 FE) MG tablet, Take 2 tablets (480 mg total) by mouth 2 (two) times daily with meals., Disp: 120 tablet, Rfl: 3    folic acid (FOLVITE) 1 MG tablet, Take 1 tablet (1 mg total) by mouth once daily., Disp: 30 tablet, Rfl: 5    nortriptyline (PAMELOR) 25 MG capsule, Take 1 capsule (25 mg total) by mouth every evening., Disp: 30 capsule, Rfl: 3    ondansetron (ZOFRAN-ODT) 4 MG TbDL, 1 tablet on the tongue and allow to dissolve, Disp: , Rfl:     pantoprazole (PROTONIX) 40 MG tablet, Take 1 tablet (40 mg total) by mouth once daily. (Patient taking differently: Take 40 mg by mouth 2 (two) times daily.), Disp: 30 tablet, Rfl: 11    sucralfate (CARAFATE) 1 gram tablet, Take 1 tablet (1 g total) by mouth 4 (four) times daily., Disp: 120 tablet, Rfl: 0    thiamine 100 MG tablet, Take 1 tablet (100 mg total) by mouth once daily., Disp: 30 tablet, Rfl: 5    triamcinolone acetonide 0.1% (KENALOG) 0.1 % ointment, APPLY TOPICALLY TO THE AFFECTED AREA TWICE DAILY. DO NOT APPLY TO FACE OR ANOGENITAL REGION., Disp: 30 g, Rfl: 1    clindamycin-benzoyl peroxide (BENZACLIN) gel, Apply topically 2 (two) times daily., Disp: 50 g, Rfl: 1    oxyCODONE (ROXICODONE) 5 MG immediate release tablet, Take 1 tablet (5 mg total) by mouth 2 (two) times daily as needed for Pain. (Patient not taking: Reported on 10/4/2022), Disp: 60 tablet, Rfl: 0    [START ON 10/9/2022] oxyCODONE (ROXICODONE) 5 MG immediate release tablet, Take 1 tablet (5 mg total) by mouth 2 (two) times daily as needed for Pain., Disp: 60 tablet, Rfl: 0    potassium chloride (K-TAB) 20 mEq, 1 tablet with food,  Disp: , Rfl:     rifAXIMin (XIFAXAN) 550 mg Tab, Take 1 tablet (550 mg total) by mouth 2 (two) times daily., Disp: 60 tablet, Rfl: 11    sildenafiL (VIAGRA) 100 MG tablet, Take 1 tablet (100 mg total) by mouth daily as needed for Erectile Dysfunction., Disp: 30 tablet, Rfl: 5        Assessment:       1. AVN (avascular necrosis of bone)    2. Limited mobility    3. Alcoholic cirrhosis of liver without ascites    4. Acne vulgaris           Plan:       AVN (avascular necrosis of bone)  Limited mobility  Alcoholic cirrhosis of liver without ascites  -     MOTORIZED SCOOTER FOR HOME USE  - Agree with need for mobility scooter at this time as wheelchair manipulation will likely exacerbate pain    Acne vulgaris  -     Discontinue: benzoyl peroxide (BENZAC AC) 10 % external wash; Apply topically 2 (two) times daily.  Dispense: 148 g; Refill: 1  -     Ambulatory referral/consult to Dermatology; Future; Expected date: 10/11/2022  -     clindamycin-benzoyl peroxide (BENZACLIN) gel; Apply topically 2 (two) times daily.  Dispense: 50 g; Refill: 1     RTC in 3 months

## 2022-10-05 ENCOUNTER — HOSPITAL ENCOUNTER (OUTPATIENT)
Dept: PSYCHIATRY | Facility: HOSPITAL | Age: 47
Discharge: HOME | End: 2022-10-05
Attending: PSYCHIATRY & NEUROLOGY | Admitting: PSYCHIATRY & NEUROLOGY
Payer: MEDICARE

## 2022-10-05 ENCOUNTER — LAB VISIT (OUTPATIENT)
Dept: LAB | Facility: HOSPITAL | Age: 47
End: 2022-10-05
Attending: PSYCHIATRY & NEUROLOGY
Payer: MEDICARE

## 2022-10-05 DIAGNOSIS — Z79.899 LONG-TERM USE OF HIGH-RISK MEDICATION: ICD-10-CM

## 2022-10-05 DIAGNOSIS — F10.20 ALCOHOL USE DISORDER, SEVERE, DEPENDENCE: Primary | Chronic | ICD-10-CM

## 2022-10-05 DIAGNOSIS — Z79.899 LONG-TERM USE OF HIGH-RISK MEDICATION: Primary | ICD-10-CM

## 2022-10-05 LAB
AMMONIA PLAS-SCNC: 90 UMOL/L (ref 10–50)
AMPHET+METHAMPHET UR QL: NEGATIVE
BARBITURATES UR QL SCN>200 NG/ML: NEGATIVE
BENZODIAZ UR QL SCN>200 NG/ML: NEGATIVE
BZE UR QL SCN: NEGATIVE
CANNABINOIDS UR QL SCN: NEGATIVE
CREAT UR-MCNC: 152 MG/DL (ref 23–375)
ETHANOL UR-MCNC: <10 MG/DL
ETHYL GLUCURONIDE: NEGATIVE NG/ML
METHADONE UR QL SCN>300 NG/ML: NEGATIVE
OPIATES UR QL SCN: NEGATIVE
PCP UR QL SCN>25 NG/ML: NEGATIVE
TOXICOLOGY INFORMATION: NORMAL

## 2022-10-05 PROCEDURE — 90853 GROUP PSYCHOTHERAPY: CPT

## 2022-10-05 PROCEDURE — 90853 GROUP PSYCHOTHERAPY: CPT | Performed by: SOCIAL WORKER

## 2022-10-05 PROCEDURE — 99233 PR SUBSEQUENT HOSPITAL CARE,LEVL III: ICD-10-PCS | Mod: 25,GC,, | Performed by: PSYCHIATRY & NEUROLOGY

## 2022-10-05 PROCEDURE — 36415 COLL VENOUS BLD VENIPUNCTURE: CPT | Performed by: PSYCHIATRY & NEUROLOGY

## 2022-10-05 PROCEDURE — 99233 SBSQ HOSP IP/OBS HIGH 50: CPT | Mod: 25,GC,, | Performed by: PSYCHIATRY & NEUROLOGY

## 2022-10-05 PROCEDURE — 80307 DRUG TEST PRSMV CHEM ANLYZR: CPT | Performed by: PSYCHIATRY & NEUROLOGY

## 2022-10-05 PROCEDURE — 90853 GROUP PSYCHOTHERAPY: CPT | Mod: ,,, | Performed by: PSYCHOLOGIST

## 2022-10-05 PROCEDURE — 82140 ASSAY OF AMMONIA: CPT | Performed by: PSYCHIATRY & NEUROLOGY

## 2022-10-05 PROCEDURE — 90853 PR GROUP PSYCHOTHERAPY: ICD-10-PCS | Mod: ,,, | Performed by: PSYCHOLOGIST

## 2022-10-05 NOTE — PROGRESS NOTES
"    [facility]  DEPARTMENT OF PSYCHIATRY    SUBSEQUENT VISIT  [facility]    KEY:     I[]I Y = II[x][]II = Yes / Present / Present Though Denies / Endorses  I[]I N = II[][x]II = No / Absent / Absent Though Endorses / Denies  I[]I U = II[][]II = Unknown / Unable to Assess/Enact / Unwilling to Participate/Provide  I[]I A = II[x][x]II = Ambiguity / Uncertainty of Accuracy Exists  I[]I D = Denial or Minimization is Suspected/Evident  I[]I N/A = Non-Applicable    CHIEF COMPLAINT:     Patient Name: Irvin Diaz Jr.  YOB: 1975  MRN: [id number]    Irvin Diaz Jr. is a 47 y.o. male who is being seen by me for a follow up visit.  Irvin Diaz Jr. presents with the chief complaint of: problematic substance use/abuse and alcohol and/or drug addiction    CHART REVIEW:     Available documentation has been reviewed, and pertinent elements of the chart - including previous psychiatric evaluations - have been incorporated into this evaluation where appropriate.    PRESENTATION:     HPI, PSYCHIATRIC ROS & APPLICABLE MEDICAL ROS:   .  Patient reports doing well this morning, states that he continues to attend virtual meetings. He does not currently have a sponsor, and is in the process of finding one. Does report an episode of "shakiness" when holding a plate yesterday, but denies anxiety at that time.     Patient reports recently being put on rifaximin by his hepatologist in order to lower his ammonia level. Patient also taking lactulose but states he is only taking once a day - patient informed he should be taking all meds as prescribed and warned of the risks of elevated ammonia level. Patient reports understanding.     .  CURRENT PSYCHOTROPIC REGIMEN:     Acamprosate - 666mg TID  Nortriptyline 25mg bedtime    HISTORY:     PFSH: The patient's past psychiatric, family, social and medical history have been reviewed and updated as appropriate within the electronic medical record system. " "           The electronic chart was reviewed and updated as appropriate.  See [employer] for details.           RISK:     III[x]III  RISK PARAMETERS:     The following risk parameters were assessed during this evaluation:    I[]I Y  I[x]I N  I[]I U  I[]I A  Suicidal Ideation/Behavior: **   I[]I Y  I[x]I N  I[]I U  I[]I A  Homicidal Ideation/Behavior: **  I[]I Y  I[x]I N  I[]I U  I[]I A  Violence: **  I[]I Y  I[x]I N  I[]I U  I[]I A  Self-Injurious Behavior: **    I[]I Y  I[x]I N  I[]I U  I[]I A  I[]I N/A  Minimization of Symptoms Suspected/Evident: **  I[]I Y  I[x]I N  I[]I U  I[]I A  I[]I N/A  Exaggeration of Symptoms Suspected/Evident: **      III[x]III  CLINICAL RISK DETERMINATION:     Current risk is judged to be:  I[x]I Low    I[]I Moderate   I[]I High    The following criteria were met for involuntary psychiatric admission:   I[x]I None    I[]I Dangerous to Self    I[]I Dangerous to Others    I[]I Gravely Disabled      EXAMINATION:     VITALS:     There were no vitals filed for this visit.        MENTAL STATUS EXAMINATION:     General Appearance: appropriately dressed, adequately groomed  Behavior: cooperative, appropriate eye contact, under good behavioral control  Movements and Motor Activity: no abnormal involuntary movements noted, no psychomotor agitation or retardation  Gait and Station: utilizes wheel chair for long distances  Speech: normal rate/rhythm/volume/tone, conversational, decreased spontaneity, coherent  Language: fluent English, repeats words/phrases, no word-finding difficulties noted  Mood: "pretty good"  Affect: subdued, low key, reactive, appropriate  Thought Process: linear, goal-directed, organized, logical  Associations: intact, no loosening of associations  Thought Content: no suicidal ideation, no homicidal ideation, no paranoid ideation, no ideas of reference, no evidence of delusions or psychosis  Perceptions: no auditory or visual hallucinations  Insight: intact, demonstrates " awareness of situation  Judgment: intact, behavior is adequate/appropriate to the circumstances      ASSESSMENT:     III[x]III  DIAGNOSES AND PROBLEMS:             .  Alcohol Use Disorder, moderate-severe  Alcoholic Cirrhosis            .  PLAN:     IMPRESSION AND RECOMMENDATIONS:     MANAGEMENT PLAN, TREATMENT GOALS, THERAPEUTIC TECHNIQUES/APPROACHES & CLINICAL REASONING:     - continue acamprosate 666mg TID  - sent message to hepatologist Dr. Veronica about recent labs - CMP and ammonia  - follow up PETH level  - cont nortriptyline - through pain management  -continue lactulose, rifaximin for ammonia - informed of need to take as prescribed and not to miss dosages due to risk of elevated ammonia being neurotoxic and can cause encephalopathy, confusion   - avoid oxycodone at this time - pain management has ok'ed the use of opiates - if pain exacerbated we will revisit if need be - but currently he prefers to avoid - he agrees to speak with us prior to resuming so we can discuss    Disposition:    - Continue ORP.  - Continue ORP protocol.  - Additional workup is planned to address substance use disorder, in order to guide and refine ongoing management options, including serial alcohol and drug laboratory testing (e.g. POCT breath alcohol test, urine toxicology, alcohol biomarkers, PETH).  - Full engagement in 12 step (or equivalent) recovery program(s), including mandatory meeting attendance and acquisition of sponsor.  - Patient counseled on abstinence from alcohol and substances of abuse (illicit and prescription).  - Relapse prevention and motivational interviewing provided.  - MAT for alcohol use disorder was discussed including acamprosate, naltrexone and disulfiram.    III[x]III  PRESCRIPTION DRUG MANAGEMENT:     Prescription Drug Management entails the review, recommendation, or consideration without recommendation of medications, and as such was employed during the encounter.    Discussed, to the extent  possible, diagnosis, risks and benefits of proposed treatment vs alternative treatments vs no treatment, potential side effects of these treatments and the inherent unpredictability of treatment. The patient's ability to understand, participate and engage in a conversation surrounding this was deemed to be: adequate. The patient expresses understanding of the above and displays the capacity to agree with this treatment given said understanding. Patient also agrees that, currently, the benefits outweigh the risks and would like to continue treatment at this time.     Written material has additionally been provided, via the AVS or other pre-printed handouts.      MEDICAL DECISION MAKING:     Shared medical decision making and informed consent are the hallmark and bedrock of good clinical care, and as such have been employed and obtained, respectively, to the degree possible.  Written material has been provided to supplement, augment, and reinforce any discussions and interventions, via the AVS or other pre-printed handouts.    Additional psychoeducation has been provided, as warranted.      LEVEL OF MEDICAL DECISION MAKING AND TIME:     Complexity (level) of medical decision making employed in the encounter: MODERATE-HIGH    The total time for services performed on the date of the encounter: 30 minutes    Case discussed with staff Dr. Desselle, MD Ross Wiedemann, MD  [facility] PGY-4  [facility] [address]       DATA:     DIAGNOSTIC TESTING:     The chart was reviewed for recent diagnostic procedures and investigations, and pertinent results are noted below.      ADDICTION LABORATORY RESULTS:     Urine Toxicology:  Benzodiazepines (no units)   Date Value   09/19/2022 Negative     Barbiturate Screen, Ur (no units)   Date Value   09/19/2022 Negative     Opiate Scrn, Ur (no units)   Date Value   09/19/2022 Negative     Methadone metabolites (no units)   Date Value   09/19/2022 Negative      BUPRENORPHINE (no units)   Date Value   08/02/2022 Not Detected     FENTANYL (no units)   Date Value   08/02/2022 Not Detected     OXYCODONE (no units)   Date Value   08/02/2022 Not Detected     Cocaine (Metab.) (no units)   Date Value   09/19/2022 Negative     Amphetamine Screen, Ur (no units)   Date Value   09/19/2022 Negative     THC (no units)   Date Value   09/19/2022 Negative     Phencyclidine (no units)   Date Value   09/19/2022 Negative       Alcohol:  PEth 16:0/18.1 (POPEth) (ng/mL)   Date Value   09/14/2022 684   08/15/2022 <10   07/18/2022 135   06/30/2022 663     Alcohol, Serum (mg/dL)   Date Value   07/18/2022 <10     Alcohol, Urine (mg/dL)   Date Value   09/19/2022 <10     Ethyl Glucuronide (ng/mL)   Date Value   09/19/2022 Negative     Ethyl Sulfate (ng/mL)   Date Value   09/12/2022 >10830     Lab Results   Component Value Date     (H) 08/15/2022     (H) 08/15/2022    AST 42 (H) 09/14/2022    ALT 25 09/14/2022    GGT 81 (H) 07/21/2022    AMMONIA 89 (H) 08/15/2022       Miscellaneous:  No results found for: ALCOHOL, LABBENZ, BARBITURATE, LABMETH, AMPHETAMINE, THCMARIJUANA, LABPHEN, BUPRENORPH, NORBUPRENOR, NICOTINESERU, COTININESERU

## 2022-10-05 NOTE — PROGRESS NOTES
Group Psychotherapy (PhD/LCSW)    Site: [facility]    Clinical status of patient: Intensive Outpatient Program (IOP)    Date: 10/05/2022    Group Focus: Distress Tolerance    Length of service: 13943 - 45-50 minutes    Number of patients in attendance: 12    Referred by: [facility] Treatment Team    Target symptoms: Substance Abuse, Depression and Anxiety    Patient's response to treatment: Active Listening and Self-disclosure    Progress toward goals: Progressing adequately    Interval History: Session focus was Distress Tolerance:  Distract with ACCEPTS.  Patients were encouraged to use activities, contributing, comparisons, different emotions, pushing away, other thoughts, and sensations to reduce intensity of distress.      Diagnosis:     ICD-10-CM ICD-9-CM   1. Alcohol use disorder, severe, dependence  F10.20 303.90   2. Long-term use of high-risk medication  Z79.899 V58.69       Plan: Continue treatment on ORP

## 2022-10-05 NOTE — PLAN OF CARE
10/05/22 1100   Activity/Group Therapy Checklist   Group Addiction Education   Attendance Attended   Follows Direction Followed directions   Group Interactions/Observations Interacted appropriately   Affect/Mood Range Normal range   Affect/Mood Display Appropriate   Goal Progression Progressing

## 2022-10-05 NOTE — PLAN OF CARE
10/05/22 1656   Activity/Group Therapy Checklist   Group Relapse Prevention   Attendance Attended   Follows Direction Followed directions   Group Interactions/Observations Interacted appropriately;Alert;Sharing;Supportive   Affect/Mood Range Normal range   Affect/Mood Display Appropriate   Goal Progression Progressing      facilitated group and discussed Triggers. Patients identified triggers and developed a plan for dealing with triggers when faced head on and strategies to help avoid or reduce exposure when triggers arise.

## 2022-10-06 ENCOUNTER — HOSPITAL ENCOUNTER (OUTPATIENT)
Dept: PSYCHIATRY | Facility: HOSPITAL | Age: 47
Discharge: HOME | End: 2022-10-06
Payer: MEDICARE

## 2022-10-06 DIAGNOSIS — K70.30 ALCOHOLIC CIRRHOSIS, UNSPECIFIED WHETHER ASCITES PRESENT: ICD-10-CM

## 2022-10-06 DIAGNOSIS — F10.20 ALCOHOL USE DISORDER, SEVERE, DEPENDENCE: Primary | ICD-10-CM

## 2022-10-06 DIAGNOSIS — Z79.899 LONG-TERM USE OF HIGH-RISK MEDICATION: ICD-10-CM

## 2022-10-06 LAB — ETHYL GLUCURONIDE: NEGATIVE NG/ML

## 2022-10-06 PROCEDURE — 90853 GROUP PSYCHOTHERAPY: CPT | Mod: ,,, | Performed by: PSYCHOLOGIST

## 2022-10-06 PROCEDURE — 90853 PR GROUP PSYCHOTHERAPY: ICD-10-PCS | Mod: ,,, | Performed by: PSYCHOLOGIST

## 2022-10-06 PROCEDURE — 90853 GROUP PSYCHOTHERAPY: CPT | Performed by: SOCIAL WORKER

## 2022-10-06 PROCEDURE — 99233 PR SUBSEQUENT HOSPITAL CARE,LEVL III: ICD-10-PCS | Mod: ,,, | Performed by: PSYCHIATRY & NEUROLOGY

## 2022-10-06 PROCEDURE — 99233 SBSQ HOSP IP/OBS HIGH 50: CPT | Mod: ,,, | Performed by: PSYCHIATRY & NEUROLOGY

## 2022-10-06 NOTE — PROGRESS NOTES
Art Therapy Techniques Group (with Psychology Intern)    Site: [facility]    Clinical status of patient: Intensive Outpatient Program (IOP)    Date: 10/06/2022    Group Focus: Psychodynamic Group Psychotherapy    Length of service: 45-50 minutes    Number of patients in attendance: 6    Referred by: Addictive Behavior Unit Treatment Team    Target symptoms: Alcohol Abuse    Patient's response to treatment: Active Listening and provided feedback to another patient.    Progress toward goals: Progressing adequately    Interval History:  Intern provided psychoeducation on the reason for and helpful properties associated with using art therapy techniques. Pt actively listened and participated in the activity. Patient asked questions when needed and provided feedback to others.    Diagnosis: Alcohol Use Disorder    Plan: Continue treatment on ABU

## 2022-10-06 NOTE — PROGRESS NOTES
Group Psychotherapy (PhD/LCSW)    Site: [facility]    Clinical status of patient: Intensive Outpatient Program (IOP)    Date: 10/6/2022    Group Focus: Psychodynamic Group Psychotherapy    Length of service: 32723 - 45-50 minutes    Number of patients in attendance: 4    Referred by: Addictive Behavior Unit Treatment Team    Target symptoms: Alcohol Abuse    Patient's response to treatment: Active Listening and Self-disclosure    Progress toward goals: Progressing adequately    Interval History: Pt reports continued sobriety. He demonstrated improved affect, and described improvements in treatment so far.    Diagnosis: Alcohol Use Disorder    Plan: Continue treatment on ABU

## 2022-10-06 NOTE — PROGRESS NOTES
"    [facility]  DEPARTMENT OF PSYCHIATRY    SUBSEQUENT VISIT  [facility]    KEY:     I[]I Y = II[x][]II = Yes / Present / Present Though Denies / Endorses  I[]I N = II[][x]II = No / Absent / Absent Though Endorses / Denies  I[]I U = II[][]II = Unknown / Unable to Assess/Enact / Unwilling to Participate/Provide  I[]I A = II[x][x]II = Ambiguity / Uncertainty of Accuracy Exists  I[]I D = Denial or Minimization is Suspected/Evident  I[]I N/A = Non-Applicable    CHIEF COMPLAINT:     Patient Name: Irvin Diaz Jr.  YOB: 1975  MRN: [id number]    Irvin Diaz Jr. is a 47 y.o. male who is being seen by me for a follow up visit.  Irvin Diaz Jr. presents with the chief complaint of: problematic substance use/abuse and alcohol and/or drug addiction    CHART REVIEW:     Available documentation has been reviewed, and pertinent elements of the chart - including previous psychiatric evaluations - have been incorporated into this evaluation where appropriate.    PRESENTATION:     HPI, PSYCHIATRIC ROS & APPLICABLE MEDICAL ROS:   .  Patient states he is doing well this AM. Denies any cravings or recent use. Reports sleeping well overnight. Patient states his goal for recovery is to "be more present for my kids. I feel like they only know me as strict when I was drinking and I don't want them to remember me like that." Patient does not have sponsor, but continues  to attend AA meetings online. Reports future orientation, family support.     .  CURRENT PSYCHOTROPIC REGIMEN:     Acamprosate - 666mg TID  Nortriptyline 25mg bedtime    HISTORY:     PFSH: The patient's past psychiatric, family, social and medical history have been reviewed and updated as appropriate within the electronic medical record system.            The electronic chart was reviewed and updated as appropriate.  See [employer] for details.           RISK:     III[x]III  RISK PARAMETERS:     The following risk parameters were " "assessed during this evaluation:    I[]I Y  I[x]I N  I[]I U  I[]I A  Suicidal Ideation/Behavior: **   I[]I Y  I[x]I N  I[]I U  I[]I A  Homicidal Ideation/Behavior: **  I[]I Y  I[x]I N  I[]I U  I[]I A  Violence: **  I[]I Y  I[x]I N  I[]I U  I[]I A  Self-Injurious Behavior: **    I[]I Y  I[x]I N  I[]I U  I[]I A  I[]I N/A  Minimization of Symptoms Suspected/Evident: **  I[]I Y  I[x]I N  I[]I U  I[]I A  I[]I N/A  Exaggeration of Symptoms Suspected/Evident: **      III[x]III  CLINICAL RISK DETERMINATION:     Current risk is judged to be:  I[x]I Low    I[]I Moderate   I[]I High    The following criteria were met for involuntary psychiatric admission:   I[x]I None    I[]I Dangerous to Self    I[]I Dangerous to Others    I[]I Gravely Disabled      EXAMINATION:     VITALS:     There were no vitals filed for this visit.        MENTAL STATUS EXAMINATION:     General Appearance: appropriately dressed, adequately groomed  Behavior: cooperative, appropriate eye contact, under good behavioral control  Movements and Motor Activity: no abnormal involuntary movements noted, no psychomotor agitation or retardation  Gait and Station: utilizes wheel chair   Speech: normal rate/rhythm/volume/tone, conversational, decreased spontaneity, coherent  Language: fluent English, repeats words/phrases, no word-finding difficulties noted  Mood: "pretty good"  Affect: subdued, low key, reactive, appropriate  Thought Process: linear, goal-directed, organized, logical  Memory: able to register 3/3 words and recall 3/3 words at 5 minutes   Concentration: able to recite months of year in reverse without error  Associations: intact, no loosening of associations  Thought Content: no suicidal ideation, no homicidal ideation, no paranoid ideation, no ideas of reference, no evidence of delusions or psychosis  Perceptions: no auditory or visual hallucinations  Insight: intact, demonstrates awareness of situation  Judgment: intact, behavior is " adequate/appropriate to the circumstances    PSYCHOTHERAPY ADD-ON +27923   16-37 minutes    Site: [facility] [address]  Time: 16 minutes  Participants: Met with patient    Therapeutic Intervention Type: insight oriented psychotherapy, supportive psychotherapy  Why chosen therapy is appropriate versus another modality: relevant to diagnosis, patient responds to this modality    Target symptoms: alcohol abuse  Primary focus: insight and ego building   Psychotherapeutic techniques: supportive    Outcome monitoring methods: self-report    Patient's response to intervention:  The patient's response to intervention is accepting.    Progress toward goals:  The patient's progress toward goals is fair .          ASSESSMENT:     III[x]III  DIAGNOSES AND PROBLEMS:             .  Alcohol Use Disorder, moderate-severe  Alcoholic Cirrhosis            .  PLAN:     IMPRESSION AND RECOMMENDATIONS:     MANAGEMENT PLAN, TREATMENT GOALS, THERAPEUTIC TECHNIQUES/APPROACHES & CLINICAL REASONING:     - continue acamprosate 666mg TID  - message  sent to hepatologist Dr. Veronica about recent labs - CMP and ammonia  - follow up PETH level second week of October  - cont nortriptyline - through pain management  -continue lactulose, rifaximin for ammonia - informed of need to take as prescribed and not to miss dosages due to risk of elevated ammonia being neurotoxic and can cause encephalopathy, confusion   - avoid oxycodone at this time - pain management has ok'ed the use of opiates - if pain exacerbated we will revisit if need be - but currently he prefers to avoid - he agrees to speak with us prior to resuming so we can discuss    Disposition:    - Continue ORP.  - Continue ORP protocol.  - Additional workup is planned to address substance use disorder, in order to guide and refine ongoing management options, including serial alcohol and drug laboratory testing (e.g. POCT breath alcohol test, urine toxicology, alcohol  biomarkers, PETH).  - Full engagement in 12 step (or equivalent) recovery program(s), including mandatory meeting attendance and acquisition of sponsor.  - Patient counseled on abstinence from alcohol and substances of abuse (illicit and prescription).  - Relapse prevention and motivational interviewing provided.  - MAT for alcohol use disorder was discussed including acamprosate, naltrexone and disulfiram.    III[x]III  PRESCRIPTION DRUG MANAGEMENT:     Prescription Drug Management entails the review, recommendation, or consideration without recommendation of medications, and as such was employed during the encounter.    Discussed, to the extent possible, diagnosis, risks and benefits of proposed treatment vs alternative treatments vs no treatment, potential side effects of these treatments and the inherent unpredictability of treatment. The patient's ability to understand, participate and engage in a conversation surrounding this was deemed to be: adequate. The patient expresses understanding of the above and displays the capacity to agree with this treatment given said understanding. Patient also agrees that, currently, the benefits outweigh the risks and would like to continue treatment at this time.     Written material has additionally been provided, via the AVS or other pre-printed handouts.      MEDICAL DECISION MAKING:     Shared medical decision making and informed consent are the hallmark and bedrock of good clinical care, and as such have been employed and obtained, respectively, to the degree possible.  Written material has been provided to supplement, augment, and reinforce any discussions and interventions, via the AVS or other pre-printed handouts.    Additional psychoeducation has been provided, as warranted.      LEVEL OF MEDICAL DECISION MAKING AND TIME:     Complexity (level) of medical decision making employed in the encounter: MODERATE-HIGH    The total time for services performed on the date  of the encounter: 25 minutes    Case discussed with staff Dr. Bliss, MD Ross Wiedemann, MD  [facility] PGY-4  [facility] [address]       DATA:     DIAGNOSTIC TESTING:     The chart was reviewed for recent diagnostic procedures and investigations, and pertinent results are noted below.      ADDICTION LABORATORY RESULTS:     Urine Toxicology:  Benzodiazepines (no units)   Date Value   09/19/2022 Negative     Barbiturate Screen, Ur (no units)   Date Value   09/19/2022 Negative     Opiate Scrn, Ur (no units)   Date Value   09/19/2022 Negative     Methadone metabolites (no units)   Date Value   09/19/2022 Negative     BUPRENORPHINE (no units)   Date Value   08/02/2022 Not Detected     FENTANYL (no units)   Date Value   08/02/2022 Not Detected     OXYCODONE (no units)   Date Value   08/02/2022 Not Detected     Cocaine (Metab.) (no units)   Date Value   09/19/2022 Negative     Amphetamine Screen, Ur (no units)   Date Value   09/19/2022 Negative     THC (no units)   Date Value   09/19/2022 Negative     Phencyclidine (no units)   Date Value   09/19/2022 Negative       Alcohol:  PEth 16:0/18.1 (POPEth) (ng/mL)   Date Value   09/14/2022 684   08/15/2022 <10   07/18/2022 135   06/30/2022 663     Alcohol, Serum (mg/dL)   Date Value   07/18/2022 <10     Alcohol, Urine (mg/dL)   Date Value   09/19/2022 <10     Ethyl Glucuronide (ng/mL)   Date Value   09/19/2022 Negative     Ethyl Sulfate (ng/mL)   Date Value   09/12/2022 >97623     Lab Results   Component Value Date     (H) 08/15/2022     (H) 08/15/2022    AST 42 (H) 09/14/2022    ALT 25 09/14/2022    GGT 81 (H) 07/21/2022    AMMONIA 89 (H) 08/15/2022       Miscellaneous:  No results found for: ALCOHOL, LABBENZ, BARBITURATE, LABMETH, AMPHETAMINE, THCMARIJUANA, LABPHEN, BUPRENORPH, NORBUPRENOR, NICOTINESERU, COTININESERU

## 2022-10-06 NOTE — PROGRESS NOTES
Group Psychotherapy (PhD/LCSW)    Site: [facility]    Clinical status of patient: Intensive Outpatient Program (IOP)    Date: 10/6/2022    Group Focus: Emotion Regulation    Length of service: 83974 - 45-50 minutes    Number of patients in attendance: 11    Referred by: [facility] Treatment Team    Target symptoms: Substance Abuse, Depression and Anxiety    Patient's response to treatment: Active Listening and Self-disclosure    Progress toward goals: Progressing adequately    Interval History: Session focus was Emotion Regulation:  Understanding Emotions.  Patients were encouraged to understand what their emotions do for them (motivate them to action, communicate to themselves and others).  Check the Facts was introduced.    Diagnosis:     ICD-10-CM ICD-9-CM   1. Long-term use of high-risk medication  Z79.899 V58.69   2. Alcohol use disorder, severe, dependence  F10.20 303.90   3. Alcoholic cirrhosis, unspecified whether ascites present  K70.30 571.2       Plan: Continue treatment on ORP

## 2022-10-06 NOTE — PLAN OF CARE
10/06/22 1400   Activity/Group Therapy Checklist   Group Addiction Education   Attendance Attended   Follows Direction Followed directions   Group Interactions/Observations Interacted appropriately   Affect/Mood Range Normal range   Affect/Mood Display Appropriate   Goal Progression Progressing

## 2022-10-07 ENCOUNTER — HOSPITAL ENCOUNTER (OUTPATIENT)
Dept: PSYCHIATRY | Facility: HOSPITAL | Age: 47
Discharge: HOME | End: 2022-10-07
Payer: MEDICARE

## 2022-10-07 VITALS
DIASTOLIC BLOOD PRESSURE: 80 MMHG | HEART RATE: 76 BPM | RESPIRATION RATE: 18 BRPM | SYSTOLIC BLOOD PRESSURE: 154 MMHG | TEMPERATURE: 98 F

## 2022-10-07 DIAGNOSIS — Z79.899 LONG-TERM USE OF HIGH-RISK MEDICATION: Primary | ICD-10-CM

## 2022-10-07 DIAGNOSIS — F10.20 ALCOHOL USE DISORDER, SEVERE, DEPENDENCE: Primary | ICD-10-CM

## 2022-10-07 DIAGNOSIS — Z79.899 LONG-TERM USE OF HIGH-RISK MEDICATION: ICD-10-CM

## 2022-10-07 LAB
AMPHET+METHAMPHET UR QL: NEGATIVE
BARBITURATES UR QL SCN>200 NG/ML: NEGATIVE
BENZODIAZ UR QL SCN>200 NG/ML: NEGATIVE
BZE UR QL SCN: NEGATIVE
CANNABINOIDS UR QL SCN: NEGATIVE
CREAT UR-MCNC: 158 MG/DL (ref 23–375)
ETHANOL UR-MCNC: <10 MG/DL
METHADONE UR QL SCN>300 NG/ML: NEGATIVE
OPIATES UR QL SCN: NEGATIVE
PCP UR QL SCN>25 NG/ML: NEGATIVE
TOXICOLOGY INFORMATION: NORMAL

## 2022-10-07 PROCEDURE — 99233 SBSQ HOSP IP/OBS HIGH 50: CPT | Mod: ,,, | Performed by: PSYCHIATRY & NEUROLOGY

## 2022-10-07 PROCEDURE — 99233 PR SUBSEQUENT HOSPITAL CARE,LEVL III: ICD-10-PCS | Mod: ,,, | Performed by: PSYCHIATRY & NEUROLOGY

## 2022-10-07 PROCEDURE — 90853 PR GROUP PSYCHOTHERAPY: ICD-10-PCS | Mod: ,,, | Performed by: PSYCHOLOGIST

## 2022-10-07 PROCEDURE — 90853 GROUP PSYCHOTHERAPY: CPT | Mod: ,,, | Performed by: PSYCHOLOGIST

## 2022-10-07 PROCEDURE — 90853 GROUP PSYCHOTHERAPY: CPT

## 2022-10-07 PROCEDURE — 80307 DRUG TEST PRSMV CHEM ANLYZR: CPT | Performed by: PSYCHIATRY & NEUROLOGY

## 2022-10-07 PROCEDURE — 90853 GROUP PSYCHOTHERAPY: CPT | Performed by: SOCIAL WORKER

## 2022-10-07 NOTE — PLAN OF CARE
10/07/22 1123   Activity/Group Therapy Checklist   Group Wellness   Attendance Attended   Follows Direction Followed directions   Group Interactions/Observations Interacted appropriately;Sharing;Supportive   Affect/Mood Range Normal range   Affect/Mood Display Appropriate   Goal Progression Progressing      and group reviewed Genogram assignment. Group to present genograms next Friday (10/14).  and group reviewed Strengths & Qualities worksheet. Patient denied any questions or concerns at this time.

## 2022-10-07 NOTE — PROGRESS NOTES
Group Psychotherapy (PhD/LCSW)    Site: [facility]    Clinical status of patient: Intensive Outpatient Program (IOP)    Date: 10/7/2022    Group Focus: Psychodynamic Group Psychotherapy    Length of service: 83632 - 45-50 minutes    Number of patients in attendance: 3    Referred by: Addictive Behavior Unit Treatment Team    Target symptoms: Alcohol Abuse    Patient's response to treatment: Active Listening and Self-disclosure    Progress toward goals: Progressing adequately    Interval History: Pt reports continued sobriety. He discussed his weekend plan, including a plan to return to Zoroastrianism for the first time since getting sober.    Diagnosis: Alcohol Use Disorder    Plan: Continue treatment on ABU

## 2022-10-07 NOTE — PROGRESS NOTES
[facility]  DEPARTMENT OF PSYCHIATRY    SUBSEQUENT VISIT  [facility]    KEY:     I[]I Y = II[x][]II = Yes / Present / Present Though Denies / Endorses  I[]I N = II[][x]II = No / Absent / Absent Though Endorses / Denies  I[]I U = II[][]II = Unknown / Unable to Assess/Enact / Unwilling to Participate/Provide  I[]I A = II[x][x]II = Ambiguity / Uncertainty of Accuracy Exists  I[]I D = Denial or Minimization is Suspected/Evident  I[]I N/A = Non-Applicable    CHIEF COMPLAINT:     Patient Name: Irvin Diaz Jr.  YOB: 1975  MRN: [id number]    Irvin Diaz Jr. is a 47 y.o. male who is being seen by me for a follow up visit.  Irvin Diaz Jr. presents with the chief complaint of: problematic substance use/abuse and alcohol and/or drug addiction    CHART REVIEW:     Available documentation has been reviewed, and pertinent elements of the chart - including previous psychiatric evaluations - have been incorporated into this evaluation where appropriate.    PRESENTATION:     HPI, PSYCHIATRIC ROS & APPLICABLE MEDICAL ROS:   .  Patient reports doing well today. Denies cravings to drink. States that he likes that this program is a little more intense than previous programs he was enrolled in. Patient reports he feels like he has opened up to greater extent this time. Continues to attend virtual [employer] meetings and is attempting to find a suitable sponsor. Reports taking medications as prescribed. States he is feeling more confident in his recovery.     .  CURRENT PSYCHOTROPIC REGIMEN:     Acamprosate - 666mg TID  Nortriptyline 25mg bedtime    HISTORY:     PFSH: The patient's past psychiatric, family, social and medical history have been reviewed and updated as appropriate within the electronic medical record system.            The electronic chart was reviewed and updated as appropriate.  See Medcard for details.           RISK:     III[x]III  RISK PARAMETERS:     The following risk  "parameters were assessed during this evaluation:    I[]I Y  I[x]I N  I[]I U  I[]I A  Suicidal Ideation/Behavior: **   I[]I Y  I[x]I N  I[]I U  I[]I A  Homicidal Ideation/Behavior: **  I[]I Y  I[x]I N  I[]I U  I[]I A  Violence: **  I[]I Y  I[x]I N  I[]I U  I[]I A  Self-Injurious Behavior: **    I[]I Y  I[x]I N  I[]I U  I[]I A  I[]I N/A  Minimization of Symptoms Suspected/Evident: **  I[]I Y  I[x]I N  I[]I U  I[]I A  I[]I N/A  Exaggeration of Symptoms Suspected/Evident: **      III[x]III  CLINICAL RISK DETERMINATION:     Current risk is judged to be:  I[x]I Low    I[]I Moderate   I[]I High    The following criteria were met for involuntary psychiatric admission:   I[x]I None    I[]I Dangerous to Self    I[]I Dangerous to Others    I[]I Gravely Disabled      EXAMINATION:     VITALS:     Vitals:    10/07/22 0900   BP: (!) 154/80   Pulse: 76   Resp: 18   Temp: 98.2 °F (36.8 °C)           MENTAL STATUS EXAMINATION:     General Appearance: appropriately dressed, adequately groomed  Behavior: cooperative, appropriate eye contact, under good behavioral control  Movements and Motor Activity: no abnormal involuntary movements noted, no psychomotor agitation or retardation  Gait and Station: utilizes wheel chair   Speech: normal rate/rhythm/volume/tone, conversational, decreased spontaneity, coherent  Language: fluent English, repeats words/phrases, no word-finding difficulties noted  Mood: "pretty good"  Affect: subdued, low key, reactive, appropriate  Thought Process: linear, goal-directed, organized, logical  Memory: able to register 3/3 words and recall 3/3 words at 5 minutes   Concentration: able to recite months of year in reverse without error  Associations: intact, no loosening of associations  Thought Content: no suicidal ideation, no homicidal ideation, no paranoid ideation, no ideas of reference, no evidence of delusions or psychosis  Perceptions: no auditory or visual hallucinations  Insight: intact, demonstrates " awareness of situation  Judgment: intact, behavior is adequate/appropriate to the circumstances    PSYCHOTHERAPY ADD-ON +[id number]   16-37 minutes    Site: [facility] [address]  Time: 16 minutes  Participants: Met with patient    Therapeutic Intervention Type: insight oriented psychotherapy, supportive psychotherapy  Why chosen therapy is appropriate versus another modality: relevant to diagnosis, patient responds to this modality    Target symptoms: alcohol abuse  Primary focus: insight and ego building   Psychotherapeutic techniques: supportive    Outcome monitoring methods: self-report    Patient's response to intervention:  The patient's response to intervention is accepting.    Progress toward goals:  The patient's progress toward goals is fair .          ASSESSMENT:     III[x]III  DIAGNOSES AND PROBLEMS:             .  Alcohol Use Disorder, moderate-severe  Alcoholic Cirrhosis            .  PLAN:     IMPRESSION AND RECOMMENDATIONS:     MANAGEMENT PLAN, TREATMENT GOALS, THERAPEUTIC TECHNIQUES/APPROACHES & CLINICAL REASONING:     - continue acamprosate 666mg TID  - message  sent to hepatologist Dr. Veronica about recent labs - CMP and ammonia  - follow up PETH level second week of October  - cont nortriptyline - through pain management  -continue lactulose, rifaximin for ammonia - informed of need to take as prescribed and not to miss dosages due to risk of elevated ammonia being neurotoxic and can cause encephalopathy, confusion   - avoid oxycodone at this time - pain management has ok'ed the use of opiates - if pain exacerbated we will revisit if need be - but currently he prefers to avoid - he agrees to speak with us prior to resuming so we can discuss    Disposition:    - Continue ORP.  - Continue ORP protocol.  - Additional workup is planned to address substance use disorder, in order to guide and refine ongoing management options, including serial alcohol and drug laboratory testing (e.g. POCT  breath alcohol test, urine toxicology, alcohol biomarkers, PETH).  - Full engagement in 12 step (or equivalent) recovery program(s), including mandatory meeting attendance and acquisition of sponsor.  - Patient counseled on abstinence from alcohol and substances of abuse (illicit and prescription).  - Relapse prevention and motivational interviewing provided.  - MAT for alcohol use disorder was discussed including acamprosate, naltrexone and disulfiram.    III[x]III  PRESCRIPTION DRUG MANAGEMENT:     Prescription Drug Management entails the review, recommendation, or consideration without recommendation of medications, and as such was employed during the encounter.    Discussed, to the extent possible, diagnosis, risks and benefits of proposed treatment vs alternative treatments vs no treatment, potential side effects of these treatments and the inherent unpredictability of treatment. The patient's ability to understand, participate and engage in a conversation surrounding this was deemed to be: adequate. The patient expresses understanding of the above and displays the capacity to agree with this treatment given said understanding. Patient also agrees that, currently, the benefits outweigh the risks and would like to continue treatment at this time.     Written material has additionally been provided, via the AVS or other pre-printed handouts.      MEDICAL DECISION MAKING:     Shared medical decision making and informed consent are the hallmark and bedrock of good clinical care, and as such have been employed and obtained, respectively, to the degree possible.  Written material has been provided to supplement, augment, and reinforce any discussions and interventions, via the AVS or other pre-printed handouts.    Additional psychoeducation has been provided, as warranted.      LEVEL OF MEDICAL DECISION MAKING AND TIME:     Complexity (level) of medical decision making employed in the encounter: MODERATE-HIGH    The  total time for services performed on the date of the encounter: 25 minutes    Case discussed with staff Dr. Bliss, MD Ross Wiedemann, MD  [facility] PGY-4  [facility] [address]       DATA:     DIAGNOSTIC TESTING:     The chart was reviewed for recent diagnostic procedures and investigations, and pertinent results are noted below.      ADDICTION LABORATORY RESULTS:     Urine Toxicology:  Benzodiazepines (no units)   Date Value   09/19/2022 Negative     Barbiturate Screen, Ur (no units)   Date Value   09/19/2022 Negative     Opiate Scrn, Ur (no units)   Date Value   09/19/2022 Negative     Methadone metabolites (no units)   Date Value   09/19/2022 Negative     BUPRENORPHINE (no units)   Date Value   08/02/2022 Not Detected     FENTANYL (no units)   Date Value   08/02/2022 Not Detected     OXYCODONE (no units)   Date Value   08/02/2022 Not Detected     Cocaine (Metab.) (no units)   Date Value   09/19/2022 Negative     Amphetamine Screen, Ur (no units)   Date Value   09/19/2022 Negative     THC (no units)   Date Value   09/19/2022 Negative     Phencyclidine (no units)   Date Value   09/19/2022 Negative       Alcohol:  PEth 16:0/18.1 (POPEth) (ng/mL)   Date Value   09/14/2022 684   08/15/2022 <10   07/18/2022 135   06/30/2022 663     Alcohol, Serum (mg/dL)   Date Value   07/18/2022 <10     Alcohol, Urine (mg/dL)   Date Value   09/19/2022 <10     Ethyl Glucuronide (ng/mL)   Date Value   09/19/2022 Negative     Ethyl Sulfate (ng/mL)   Date Value   09/12/2022 >87257     Lab Results   Component Value Date     (H) 08/15/2022     (H) 08/15/2022    AST 42 (H) 09/14/2022    ALT 25 09/14/2022    GGT 81 (H) 07/21/2022    AMMONIA 89 (H) 08/15/2022       Miscellaneous:  No results found for: ALCOHOL, LABBENZ, BARBITURATE, LABMETH, AMPHETAMINE, THCMARIJUANA, LABPHEN, BUPRENORPH, NORBUPRENOR, NICOTINESERU, COTININESERU

## 2022-10-08 LAB — ETHYL GLUCURONIDE: NEGATIVE NG/ML

## 2022-10-09 NOTE — PLAN OF CARE
10/07/22 1400   Activity/Group Therapy Checklist   Group Addiction Education   Attendance Attended   Follows Direction Followed directions   Group Interactions/Observations Interacted appropriately   Affect/Mood Range Normal range   Affect/Mood Display Appropriate   Goal Progression Progressing

## 2022-10-10 ENCOUNTER — HOSPITAL ENCOUNTER (OUTPATIENT)
Dept: PSYCHIATRY | Facility: HOSPITAL | Age: 47
Discharge: HOME | End: 2022-10-10
Payer: MEDICARE

## 2022-10-10 VITALS
SYSTOLIC BLOOD PRESSURE: 172 MMHG | TEMPERATURE: 98 F | RESPIRATION RATE: 18 BRPM | DIASTOLIC BLOOD PRESSURE: 83 MMHG | HEART RATE: 72 BPM

## 2022-10-10 DIAGNOSIS — Z79.899 LONG-TERM USE OF HIGH-RISK MEDICATION: ICD-10-CM

## 2022-10-10 DIAGNOSIS — F10.20 ALCOHOL USE DISORDER, SEVERE, DEPENDENCE: Primary | ICD-10-CM

## 2022-10-10 DIAGNOSIS — Z79.899 LONG-TERM USE OF HIGH-RISK MEDICATION: Primary | ICD-10-CM

## 2022-10-10 LAB
AMPHET+METHAMPHET UR QL: NEGATIVE
BARBITURATES UR QL SCN>200 NG/ML: NEGATIVE
BENZODIAZ UR QL SCN>200 NG/ML: NEGATIVE
BZE UR QL SCN: NEGATIVE
CANNABINOIDS UR QL SCN: NEGATIVE
CREAT UR-MCNC: 196 MG/DL (ref 23–375)
ETHANOL UR-MCNC: <10 MG/DL
METHADONE UR QL SCN>300 NG/ML: NEGATIVE
OPIATES UR QL SCN: NEGATIVE
PCP UR QL SCN>25 NG/ML: NEGATIVE
TOXICOLOGY INFORMATION: NORMAL

## 2022-10-10 PROCEDURE — 99233 SBSQ HOSP IP/OBS HIGH 50: CPT | Mod: ,,, | Performed by: PSYCHIATRY & NEUROLOGY

## 2022-10-10 PROCEDURE — 90853 PR GROUP PSYCHOTHERAPY: ICD-10-PCS | Mod: ,,, | Performed by: PSYCHOLOGIST

## 2022-10-10 PROCEDURE — 80307 DRUG TEST PRSMV CHEM ANLYZR: CPT | Performed by: PSYCHIATRY & NEUROLOGY

## 2022-10-10 PROCEDURE — 99233 PR SUBSEQUENT HOSPITAL CARE,LEVL III: ICD-10-PCS | Mod: ,,, | Performed by: PSYCHIATRY & NEUROLOGY

## 2022-10-10 PROCEDURE — 90853 GROUP PSYCHOTHERAPY: CPT

## 2022-10-10 PROCEDURE — 90853 GROUP PSYCHOTHERAPY: CPT | Mod: ,,, | Performed by: PSYCHOLOGIST

## 2022-10-10 NOTE — PROGRESS NOTES
"ABU FOLLOW UP VISIT     DEPARTMENT:  Psychiatry  SITE: [facility] [address]    DATE OF ADMISSION: 10/10/2022  8:00 AM    EXAMINING PRACTITIONER: Jason Mosley      SUBJECTIVE:     HISTORY    Patient Name: Irvin Diaz Jr.  YOB: 1975    CHIEF COMPLAINT   Irvin Diaz Jr. is a 47 y.o. male who is being seen today for a follow up visit in the ABU intensive outpatient program.  Irvin Diaz Jr. presents with the chief complaint of: problematic substance use/abuse and alcohol and/or drug addiction    HPI & PSYCHIATRIC ROS  Patient reports that he is "pretty cool."  States that he was able to have a good weekend and spent quality time with his sister and pedro.  States that he has engaged in virtual meeting over the weekend and is attempting to find a sponsor.  Voices that he will be sharing his life story on 10/19 with group.  Denies SI/HI/AVH.  Reports no use over the weekend.  Reviewed medications- patient able to recall which provider is writing and each medications.  Reports needing refill for nortriptyline, will be reaching out to pain management.  States that depression/anxiety.  Voicing that [facility] has been of significant therapeutic benefit and attributes this to structure of care provided.      WITHDRAWAL SYMPTOMS: none  CRAVINGS: none      PFSH: The patient's past medical history has been reviewed and updated as appropriate within the electronic medical record system.      PSYCHOTROPIC MEDICATIONS   Acamprosate - 666mg TID  Nortriptyline 25mg bedtime      OBJECTIVE:     EXAMINATION    Vitals:    10/10/22 0900   BP: (!) 172/83   Pulse: 72   Resp: 18   Temp: 98 °F (36.7 °C)       MENTAL STATUS EXAMINATION:  General Appearance: appropriately dressed, adequately groomed  Behavior: cooperative, appropriate eye contact, under good behavioral control  Movements and Motor Activity: no abnormal involuntary movements noted, no psychomotor agitation " "or retardation  Gait and Station: utilizes wheel chair   Speech: normal rate/rhythm/volume/tone, conversational, decreased spontaneity, coherent  Language: fluent English, repeats words/phrases, no word-finding difficulties noted  Mood: "pretty good"  Affect: subdued, low key, reactive, appropriate  Thought Process: linear, goal-directed, organized, logical  Memory: able to register 3/3 words and recall 3/3 words at 5 minutes   Concentration: able to recite months of year in reverse without error  Associations: intact, no loosening of associations  Thought Content: no suicidal ideation, no homicidal ideation, no paranoid ideation, no ideas of reference, no evidence of delusions or psychosis  Perceptions: no auditory or visual hallucinations  Insight: intact, demonstrates awareness of situation  Judgment: intact, behavior is adequate/appropriate to the circumstances      ASSESSMENT:     DIAGNOSES & PROBLEMS      Patient Active Problem List   Diagnosis    Acute renal failure superimposed on stage 3 chronic kidney disease    Coagulopathy    Thrombocytopenia    History of pulmonary embolus (PE)    AVN (avascular necrosis of bone)    Hydronephrosis of right kidney    Transaminitis    Elevated lipase    Essential hypertension    Alcoholic cirrhosis of liver    CKD (chronic kidney disease) stage 3, GFR 30-59 ml/min    Hip arthritis    Arm pain    Shortness of breath    History of GI bleed    Colitis    Alcohol abuse    Refeeding syndrome    Hematemesis    Alcoholic ketoacidosis    Cirrhosis of liver    Ascites due to alcoholic cirrhosis    Diastolic dysfunction    Jaundice    Severe sepsis without septic shock    Hyperbilirubinemia    Dilation of common bile duct    Chronic left hip pain    Alcohol withdrawal    Malnutrition    Alcohol use disorder, severe, dependence    Encounter for pre-transplant evaluation for liver transplant    Hepatic encephalopathy    Long-term use of high-risk medication          III[x]III  " DIAGNOSES AND PROBLEMS:             .  Alcohol Use Disorder, moderate-severe  Alcoholic Cirrhosis            .  PLAN:      IMPRESSION AND RECOMMENDATIONS:      MANAGEMENT PLAN, TREATMENT GOALS, THERAPEUTIC TECHNIQUES/APPROACHES & CLINICAL REASONING:      - continue acamprosate 666mg TID  - message  sent to hepatologist Dr. Veronica about recent labs - CMP and ammonia  - follow up PETH level second week of October  - cont nortriptyline - through pain management   -continue lactulose, rifaximin for ammonia - informed of need to take as prescribed and not to miss dosages due to risk of elevated ammonia being neurotoxic and can cause encephalopathy, confusion   - avoid oxycodone at this time - pain management has ok'ed the use of opiates - if pain exacerbated we will revisit if need be - but currently he prefers to avoid - he agrees to speak with us prior to resuming so we can discuss     Disposition:    - Continue ORP.  - Continue ORP protocol.  - Additional workup is planned to address substance use disorder, in order to guide and refine ongoing management options, including serial alcohol and drug laboratory testing (e.g. POCT breath alcohol test, urine toxicology, alcohol biomarkers, PETH).  - Full engagement in 12 step (or equivalent) recovery program(s), including mandatory meeting attendance and acquisition of sponsor.  - Patient counseled on abstinence from alcohol and substances of abuse (illicit and prescription).  - Relapse prevention and motivational interviewing provided.  - MAT for alcohol use disorder was discussed including acamprosate, naltrexone and disulfiram.     III[x]III  PRESCRIPTION DRUG MANAGEMENT:      Prescription Drug Management entails the review, recommendation, or consideration without recommendation of medications, and as such was employed during the encounter.     Discussed, to the extent possible, diagnosis, risks and benefits of proposed treatment vs alternative treatments vs no  treatment, potential side effects of these treatments and the inherent unpredictability of treatment. The patient's ability to understand, participate and engage in a conversation surrounding this was deemed to be: adequate. The patient expresses understanding of the above and displays the capacity to agree with this treatment given said understanding. Patient also agrees that, currently, the benefits outweigh the risks and would like to continue treatment at this time.      Written material has additionally been provided, via the AVS or other pre-printed handouts.         Case discussed with staff addiction psychiatrist: REBEKAH LESLIE MD      LABS/IMAGING/STUDIES   [unfilled]

## 2022-10-10 NOTE — PROGRESS NOTES
Group Psychotherapy (PhD/LCSW)    Site: [facility]    Clinical status of patient: Intensive Outpatient Program (IOP)    Date: 10/10/2022    Group Focus: Psychodynamic Group Psychotherapy    Length of service: 78221 - 45-50 minutes    Number of patients in attendance: 5    Referred by: Addictive Behavior Unit Treatment Team    Target symptoms: Alcohol Abuse    Patient's response to treatment: Active Listening and Self-disclosure    Progress toward goals: Progressing adequately    Interval History: Pt reports continued sobriety. He shared some successes from the weekend, including avoiding risky situations.    Diagnosis: Alcohol Use Disorder    Plan: Continue treatment on ABU

## 2022-10-10 NOTE — PLAN OF CARE
"   10/10/22 1214   Activity/Group Therapy Checklist   Group Meditation/Relaxation   Attendance Attended   Follows Direction Followed directions   Group Interactions/Observations Interacted appropriately;Alert;Sharing;Supportive   Affect/Mood Range Normal range   Affect/Mood Display Appropriate   Goal Progression Progressing     Discussed mindfulness as a practice, the definition of mindfulness, and various research evidence to strengthen benefits of mindfulness. Engaged in "thought bubble" meditation where members were invited to witness body sensations, breath, and thoughts coming and going without attaching onto them.    "

## 2022-10-11 ENCOUNTER — HOSPITAL ENCOUNTER (OUTPATIENT)
Dept: PSYCHIATRY | Facility: HOSPITAL | Age: 47
Discharge: HOME | End: 2022-10-11
Payer: MEDICARE

## 2022-10-11 DIAGNOSIS — F10.20 ALCOHOL USE DISORDER, SEVERE, DEPENDENCE: Primary | ICD-10-CM

## 2022-10-11 PROCEDURE — 90853 PR GROUP PSYCHOTHERAPY: ICD-10-PCS | Mod: ,,, | Performed by: PSYCHOLOGIST

## 2022-10-11 PROCEDURE — 90853 GROUP PSYCHOTHERAPY: CPT | Mod: ,,, | Performed by: PSYCHOLOGIST

## 2022-10-11 PROCEDURE — 90853 GROUP PSYCHOTHERAPY: CPT | Performed by: SOCIAL WORKER

## 2022-10-11 NOTE — PROGRESS NOTES
Group Psychotherapy (PhD/LCSW)    Site: [facility]    Clinical status of patient: Intensive Outpatient Program (IOP)    Date: 10/11/2022    Group Focus: Psychodynamic Group Psychotherapy    Length of service: 90242 - 45-50 minutes    Number of patients in attendance: 6    Referred by: Addictive Behavior Unit Treatment Team    Target symptoms: Alcohol Abuse    Patient's response to treatment: Active Listening and Self-disclosure    Progress toward goals: Progressing adequately    Interval History: Pt reports continued sobriety. He shared with group a very upsetting situation that happened last night with son, and received a lot of support.     Diagnosis: Alcohol Use Disorder    Plan: Continue treatment on ABU

## 2022-10-11 NOTE — PROGRESS NOTES
Individual Psychotherapy (with Psychology Intern)    Site: [facility]    Clinical status of patient: Intensive Outpatient Program (IOP)    Date: 10/11/2022    Group Focus: Psychodynamic Group Psychotherapy    Length of service: 45 minutes    Number of sessions with patient: 2    Referred by: Addictive Behavior Unit Treatment Team    Target symptoms: Alcohol Abuse    Patient's response to treatment: Active Listening and Self-disclosure    Progress toward goals: Progressing adequately    Interval History: Pt detailed a situation the previous evening involving his son and worked with the clinician to explore his feelings on the matter. He denied any urges or cravings. Patient further worked to explore his goals after completion of the IOP.    Diagnosis: Alcohol Use Disorder    Plan: Continue treatment on ABU

## 2022-10-11 NOTE — PROGRESS NOTES
Group Psychotherapy (with Psychology Intern)     Site: [facility]     Clinical status of patient: Intensive Outpatient Program (IOP)     Date: 10/10/2022     Group Focus: ACT Skills     Length of service: 45-50 minutes     Number of patients in attendance: 8     Referred by: [facility]     Target symptoms: Depression and Anxiety     Patient's response to treatment: Active Listening and Participation     Progress toward goals: Progressing adequately     Interval History: Session focus was discussing value conflicts. Patient discussed handouts and provided examples of values, goals, and life domains when asked. He asked clarifying questions and provided personal examples.     Diagnosis:    Alcohol Use Disorder     Plan: Continue treatment on ORP

## 2022-10-12 ENCOUNTER — HOSPITAL ENCOUNTER (OUTPATIENT)
Dept: PSYCHIATRY | Facility: HOSPITAL | Age: 47
Discharge: HOME | End: 2022-10-12
Payer: MEDICARE

## 2022-10-12 VITALS
DIASTOLIC BLOOD PRESSURE: 84 MMHG | TEMPERATURE: 98 F | RESPIRATION RATE: 18 BRPM | HEART RATE: 70 BPM | SYSTOLIC BLOOD PRESSURE: 166 MMHG

## 2022-10-12 DIAGNOSIS — G89.29 CHRONIC LEFT HIP PAIN: ICD-10-CM

## 2022-10-12 DIAGNOSIS — M25.552 CHRONIC LEFT HIP PAIN: ICD-10-CM

## 2022-10-12 DIAGNOSIS — F10.20 ALCOHOL USE DISORDER, SEVERE, DEPENDENCE: Primary | ICD-10-CM

## 2022-10-12 DIAGNOSIS — K70.30 ALCOHOLIC CIRRHOSIS, UNSPECIFIED WHETHER ASCITES PRESENT: ICD-10-CM

## 2022-10-12 DIAGNOSIS — F10.20 ALCOHOL USE DISORDER, SEVERE, DEPENDENCE: Primary | Chronic | ICD-10-CM

## 2022-10-12 DIAGNOSIS — Z79.899 LONG-TERM USE OF HIGH-RISK MEDICATION: ICD-10-CM

## 2022-10-12 LAB
AMPHET+METHAMPHET UR QL: NEGATIVE
BARBITURATES UR QL SCN>200 NG/ML: NEGATIVE
BENZODIAZ UR QL SCN>200 NG/ML: NEGATIVE
BZE UR QL SCN: NEGATIVE
CANNABINOIDS UR QL SCN: NEGATIVE
CREAT UR-MCNC: 232 MG/DL (ref 23–375)
ETHANOL UR-MCNC: <10 MG/DL
ETHYL GLUCURONIDE: NEGATIVE NG/ML
METHADONE UR QL SCN>300 NG/ML: NEGATIVE
OPIATES UR QL SCN: NEGATIVE
PCP UR QL SCN>25 NG/ML: NEGATIVE
TOXICOLOGY INFORMATION: NORMAL

## 2022-10-12 PROCEDURE — 90853 GROUP PSYCHOTHERAPY: CPT | Mod: ,,, | Performed by: PSYCHOLOGIST

## 2022-10-12 PROCEDURE — 90853 PR GROUP PSYCHOTHERAPY: ICD-10-PCS | Mod: ,,, | Performed by: PSYCHOLOGIST

## 2022-10-12 PROCEDURE — 80307 DRUG TEST PRSMV CHEM ANLYZR: CPT | Mod: 59 | Performed by: PSYCHIATRY & NEUROLOGY

## 2022-10-12 PROCEDURE — 99232 PR SUBSEQUENT HOSPITAL CARE,LEVL II: ICD-10-PCS | Mod: ,,, | Performed by: PSYCHIATRY & NEUROLOGY

## 2022-10-12 PROCEDURE — 90853 GROUP PSYCHOTHERAPY: CPT

## 2022-10-12 PROCEDURE — 80307 DRUG TEST PRSMV CHEM ANLYZR: CPT | Performed by: PSYCHIATRY & NEUROLOGY

## 2022-10-12 PROCEDURE — 99232 SBSQ HOSP IP/OBS MODERATE 35: CPT | Mod: ,,, | Performed by: PSYCHIATRY & NEUROLOGY

## 2022-10-12 PROCEDURE — 90853 GROUP PSYCHOTHERAPY: CPT | Performed by: SOCIAL WORKER

## 2022-10-12 NOTE — PLAN OF CARE
10/12/22 1100   Activity/Group Therapy Checklist   Group Educational   Attendance Attended   Follows Direction Followed directions   Group Interactions/Observations Interacted appropriately   Affect/Mood Range Normal range   Affect/Mood Display Appropriate   Goal Progression Progressing

## 2022-10-12 NOTE — PLAN OF CARE
10/11/22 1100   Activity/Group Therapy Checklist   Group Relapse Prevention   Attendance Attended   Follows Direction Followed directions   Group Interactions/Observations Interacted appropriately   Affect/Mood Range Normal range   Affect/Mood Display Appropriate   Goal Progression Progressing

## 2022-10-12 NOTE — PROGRESS NOTES
Group Psychotherapy (PhD/LCSW)    Site: [facility]    Clinical status of patient: Intensive Outpatient Program (IOP)    Date: 10/12/2022     Group Focus: Distress Tolerance    Length of service: 19883 - 45-50 minutes    Number of patients in attendance: 9    Referred by: [facility] Treatment Team    Target symptoms: Substance Abuse, Depression and Anxiety    Patient's response to treatment: Active Listening and Self-disclosure    Progress toward goals: Progressing adequately    Interval History: Session focus was Distress Tolerance: Self-Soothe.  Patients were encouraged to use crisis survival skills to reduce intensity of distress.  Each patient identified something soothing for all five senses.    Diagnosis:     ICD-10-CM ICD-9-CM   1. Alcohol use disorder, severe, dependence  F10.20 303.90   2. Alcoholic cirrhosis, unspecified whether ascites present  K70.30 571.2   3. Chronic left hip pain  M25.552 719.45    G89.29 338.29       Plan: Continue treatment on ORP

## 2022-10-12 NOTE — PLAN OF CARE
10/12/22 1532   Activity/Group Therapy Checklist   Group Other (Comments)  (Life Story)   Attendance Attended   Follows Direction Followed directions   Group Interactions/Observations Interacted appropriately;Alert;Sharing;Supportive   Affect/Mood Range Normal range   Affect/Mood Display Appropriate   Goal Progression Progressing     Patient listened to other group member's life story and was able to offered support and feedback.

## 2022-10-12 NOTE — PATIENT CARE CONFERENCE
Diagnoses:    Alcohol Use Disorder, moderate-severe  Alcoholic Cirrhosis    Progress:    Team staffed patient. Patient has been appropriate and cooperative during program. Patient has been supportive and offered feedback to other group members. Team will continue to monitor patient's progress during program.       Plan of Care:    Patient will continue ORP.     Aftercare/Follow ups  Med Management: 10/31 at 2 PM  Dariusz Doyle NP  Psychotherapy: TBD      Staff present:  MD Ciarra Bernardo, PhD  Corby Braun MD Resident  Jason Mosley MD Resident  Ajay Spencer, AMANDA Feldman, LCSW  Jacqui Ybarra, LCSW  Sheron George, RSW

## 2022-10-12 NOTE — PROGRESS NOTES
"ABU FOLLOW UP VISIT     DEPARTMENT:  Psychiatry  SITE: [facility], [address]    DATE OF ADMISSION: 10/12/2022  8:00 AM    EXAMINING PRACTITIONER: Corby Braun      SUBJECTIVE:     HISTORY    Patient Name: Irvin Diaz Jr.  YOB: 1975    CHIEF COMPLAINT   Irvin Diaz Jr. is a 47 y.o. male who is being seen today for a follow up visit in the ABU intensive outpatient program.  Irvin Diaz Jr. presents with the chief complaint of: problematic substance use/abuse and alcohol and/or drug addiction    HPI & PSYCHIATRIC ROS  Patient reports that he is "doing well," but shares that his son was arrested for possession of a gun after an altercation at a gas station on Monday 10/10/22. This came as a complete surprise to him and his wife and they are now looking into boarding schools for their 16 year old son. "He has been hanging around a bad crowd and now we have to wait until January's court date to find out what will happen." Patient reports that, in the past, he would have obtained alcohol in order to deal with the stress of the situation; however, this did not enter his mind. He denies any cravings or relapse. He mentions the group therapy has been helpful, especially now that he feels more comfortable in that setting. He has been attending virtual AA meetings and plans to attend another tonight. He denies any complications with his medication regimen and states his pain has been tolerable without opioid medications.     WITHDRAWAL SYMPTOMS: none  CRAVINGS: none      PFSH: The patient's past medical history has been reviewed and updated as appropriate within the electronic medical record system.      PSYCHOTROPIC MEDICATIONS   Acamprosate 666 mg PO TID  Nortriptyline 25 mg PO Nightly      OBJECTIVE:     EXAMINATION    Vitals:    10/12/22 0900   BP: (!) 166/84   Pulse: 70   Resp: 18   Temp: 97.5 °F (36.4 °C)       MENTAL STATUS EXAMINATION:  General Appearance: " "appropriately dressed, adequately groomed  Behavior: cooperative, appropriate eye contact, under good behavioral control  Movements and Motor Activity: no abnormal involuntary movements noted, no psychomotor agitation or retardation  Gait and Station: utilizes wheel chair   Speech: normal rate/rhythm/volume/tone, conversational, decreased spontaneity, coherent  Language: fluent English, repeats words/phrases, no word-finding difficulties noted  Mood: "doing well"  Affect: subdued, low key, reactive, appropriate  Thought Process: linear, goal-directed, organized, logical  Memory: able to register 3/3 words and recall 3/3 words at 5 minutes   Concentration: able to recite months of year in reverse without error  Associations: intact, no loosening of associations  Thought Content: no suicidal ideation, no homicidal ideation, no paranoid ideation, no ideas of reference, no evidence of delusions or psychosis  Perceptions: no auditory or visual hallucinations  Insight: intact, demonstrates awareness of situation  Judgment: intact, behavior is adequate/appropriate to the circumstances      ASSESSMENT:     DIAGNOSES & PROBLEMS      Patient Active Problem List   Diagnosis    Acute renal failure superimposed on stage 3 chronic kidney disease    Coagulopathy    Thrombocytopenia    History of pulmonary embolus (PE)    AVN (avascular necrosis of bone)    Hydronephrosis of right kidney    Transaminitis    Elevated lipase    Essential hypertension    Alcoholic cirrhosis of liver    CKD (chronic kidney disease) stage 3, GFR 30-59 ml/min    Hip arthritis    Arm pain    Shortness of breath    History of GI bleed    Colitis    Alcohol abuse    Refeeding syndrome    Hematemesis    Alcoholic ketoacidosis    Cirrhosis of liver    Ascites due to alcoholic cirrhosis    Diastolic dysfunction    Jaundice    Severe sepsis without septic shock    Hyperbilirubinemia    Dilation of common bile duct    Chronic left hip pain    Alcohol withdrawal "    Malnutrition    Alcohol use disorder, severe, dependence    Encounter for pre-transplant evaluation for liver transplant    Hepatic encephalopathy    Long-term use of high-risk medication          III[x]III  DIAGNOSES AND PROBLEMS:             .  Alcohol Use Disorder, moderate-severe  Alcoholic Cirrhosis            .  PLAN:      IMPRESSION AND RECOMMENDATIONS:      MANAGEMENT PLAN, TREATMENT GOALS, THERAPEUTIC TECHNIQUES/APPROACHES & CLINICAL REASONING:      - Acamprosate 666 mg PO TID  - message  sent to hepatologist Dr. Veronica about recent labs - CMP and ammonia  - PETH results colected on 9/28/22: 230  - continue Nortriptyline - managed through pain management   - continue lactulose, rifaximin for ammonia - informed of need to take as prescribed and not to miss dosages due to risk of elevated ammonia being neurotoxic and can cause encephalopathy, confusion   - avoid oxycodone at this time - pain management has ok'ed the use of opiates - if pain exacerbated we will revisit if need be - but currently he prefers to avoid - he agrees to speak with us prior to resuming so we can discuss     Disposition:    - Continue ORP.  - Continue ORP protocol.  - Additional workup is planned to address substance use disorder, in order to guide and refine ongoing management options, including serial alcohol and drug laboratory testing (e.g. POCT breath alcohol test, urine toxicology, alcohol biomarkers, PETH).  - Full engagement in 12 step (or equivalent) recovery program(s), including mandatory meeting attendance and acquisition of sponsor.  - Patient counseled on abstinence from alcohol and substances of abuse (illicit and prescription).  - Relapse prevention and motivational interviewing provided.  - MAT for alcohol use disorder was discussed including acamprosate, naltrexone and disulfiram.     III[x]III  PRESCRIPTION DRUG MANAGEMENT:      Prescription Drug Management entails the review, recommendation, or consideration  without recommendation of medications, and as such was employed during the encounter.     Discussed, to the extent possible, diagnosis, risks and benefits of proposed treatment vs alternative treatments vs no treatment, potential side effects of these treatments and the inherent unpredictability of treatment. The patient's ability to understand, participate and engage in a conversation surrounding this was deemed to be: adequate. The patient expresses understanding of the above and displays the capacity to agree with this treatment given said understanding. Patient also agrees that, currently, the benefits outweigh the risks and would like to continue treatment at this time.      Written material has additionally been provided, via the AVS or other pre-printed handouts.         Case discussed with staff addiction psychiatrist: Dr. Brock Braun MD  [facility], PGY-2

## 2022-10-12 NOTE — PROGRESS NOTES
Group Psychotherapy (PhD/LCSW)    Site: [facility]    Clinical status of patient: Intensive Outpatient Program (IOP)    Date: 10/12/2022    Group Focus: Psychodynamic Group Psychotherapy    Length of service: 42932 - 45-50 minutes    Number of patients in attendance: 5    Referred by: Addictive Behavior Unit Treatment Team    Target symptoms: Alcohol Abuse    Patient's response to treatment: Active Listening and Self-disclosure    Progress toward goals: Progressing adequately    Interval History: Pt reports continued sobriety. He shared how he was able to be there for his son after his crisis since he is sober.    Diagnosis: Alcohol Use Disorder    Plan: Continue treatment on ABU

## 2022-10-14 ENCOUNTER — HOSPITAL ENCOUNTER (OUTPATIENT)
Dept: PSYCHIATRY | Facility: HOSPITAL | Age: 47
Discharge: HOME | End: 2022-10-14
Payer: MEDICARE

## 2022-10-14 DIAGNOSIS — Z79.899 LONG-TERM USE OF HIGH-RISK MEDICATION: Primary | ICD-10-CM

## 2022-10-14 DIAGNOSIS — F10.20 ALCOHOL USE DISORDER, SEVERE, DEPENDENCE: Primary | Chronic | ICD-10-CM

## 2022-10-14 DIAGNOSIS — K70.30 ALCOHOLIC CIRRHOSIS, UNSPECIFIED WHETHER ASCITES PRESENT: ICD-10-CM

## 2022-10-14 DIAGNOSIS — Z79.899 LONG-TERM USE OF HIGH-RISK MEDICATION: ICD-10-CM

## 2022-10-14 LAB
AMPHET+METHAMPHET UR QL: NEGATIVE
BARBITURATES UR QL SCN>200 NG/ML: NEGATIVE
BENZODIAZ UR QL SCN>200 NG/ML: NEGATIVE
BZE UR QL SCN: NEGATIVE
CANNABINOIDS UR QL SCN: NEGATIVE
CREAT UR-MCNC: 214 MG/DL (ref 23–375)
ETHANOL UR-MCNC: <10 MG/DL
ETHYL GLUCURONIDE: NEGATIVE NG/ML
METHADONE UR QL SCN>300 NG/ML: NEGATIVE
OPIATES UR QL SCN: NEGATIVE
PCP UR QL SCN>25 NG/ML: NEGATIVE
TOXICOLOGY INFORMATION: NORMAL

## 2022-10-14 PROCEDURE — 90853 PR GROUP PSYCHOTHERAPY: ICD-10-PCS | Mod: ,,, | Performed by: PSYCHOLOGIST

## 2022-10-14 PROCEDURE — 99232 PR SUBSEQUENT HOSPITAL CARE,LEVL II: ICD-10-PCS | Mod: ,,, | Performed by: PSYCHIATRY & NEUROLOGY

## 2022-10-14 PROCEDURE — 90853 GROUP PSYCHOTHERAPY: CPT | Mod: ,,, | Performed by: PSYCHOLOGIST

## 2022-10-14 PROCEDURE — 99232 SBSQ HOSP IP/OBS MODERATE 35: CPT | Mod: ,,, | Performed by: PSYCHIATRY & NEUROLOGY

## 2022-10-14 PROCEDURE — 80307 DRUG TEST PRSMV CHEM ANLYZR: CPT | Performed by: PSYCHIATRY & NEUROLOGY

## 2022-10-14 PROCEDURE — 80307 DRUG TEST PRSMV CHEM ANLYZR: CPT | Mod: 59 | Performed by: PSYCHIATRY & NEUROLOGY

## 2022-10-14 PROCEDURE — 90853 GROUP PSYCHOTHERAPY: CPT | Performed by: SOCIAL WORKER

## 2022-10-14 PROCEDURE — 90853 GROUP PSYCHOTHERAPY: CPT

## 2022-10-14 NOTE — PROGRESS NOTES
"Group Psychotherapy (PhD/LCSW)    Site: [facility]    Clinical status of patient: Intensive Outpatient Program (IOP)    Date: 10/14/2022    Group Focus: Psychodynamic Group Psychotherapy    Length of service: 70283 - 45-50 minutes    Number of patients in attendance: 5    Referred by: Addictive Behavior Unit Treatment Team    Target symptoms: Alcohol Abuse    Patient's response to treatment: Active Listening and Self-disclosure    Progress toward goals: Progressing adequately    Interval History: Pt reports continued sobriety. He engaged in a weekend planning discussion, including plans to "cope ahead" with possible stress.    Diagnosis: Alcohol Use Disorder    Plan: Continue treatment on ABU        "

## 2022-10-14 NOTE — PROGRESS NOTES
"ABU FOLLOW UP VISIT     DEPARTMENT:  Psychiatry  SITE: [facility], [address]    DATE OF ADMISSION: 10/14/2022  8:00 AM    EXAMINING PRACTITIONER: Corby Braun      SUBJECTIVE:     HISTORY    Patient Name: Irvin Diaz Jr.  YOB: 1975    CHIEF COMPLAINT   Irvin Diaz Jr. is a 47 y.o. male who is being seen today for a follow up visit in the ABU intensive outpatient program.  Irvin Diaz Jr. presents with the chief complaint of: problematic substance use/abuse and alcohol and/or drug addiction    HPI & PSYCHIATRIC ROS  Patient reports that he is "fine." States that he is sleeping better the past couple of nights and not waking up as often. He also endorses good appetite. Patient shares that he and his family went yesterday to view a potential boarding school for his son. He is encouraged by the possibility of his acceptance, but does voice having thoughts/feelings of "being a failure or feeling down" due to his son getting in trouble with the law. Discussion was held concerning appropriate emotions and allowing ones self to experience and accept how he has been affected by this process. Patient acknowledged that "its how I react to these situations/feelings that will determine how I will be moving forward." Patient states his current medication regimen is adequate and denies any side effects at this time. He states that he has only been taking Lactulose at night instead of twice per day, but that he takes rifaximin twice a day as scheduled. He claims his pain is well controlled with Nortiptyline.      WITHDRAWAL SYMPTOMS: none  CRAVINGS: none      PFSH: The patient's past medical history has been reviewed and updated as appropriate within the electronic medical record system.      PSYCHOTROPIC MEDICATIONS   Acamprosate 666 mg PO TID  Nortriptyline 25 mg PO Nightly      OBJECTIVE:     EXAMINATION    There were no vitals filed for this visit.      MENTAL STATUS " "EXAMINATION:  General Appearance: appropriately dressed, adequately groomed  Behavior: cooperative, appropriate eye contact, under good behavioral control  Movements and Motor Activity: no abnormal involuntary movements noted, no psychomotor agitation or retardation  Gait and Station: utilizes wheel chair   Speech: normal rate/rhythm/volume/tone, conversational, decreased spontaneity, coherent  Language: fluent English, repeats words/phrases, no word-finding difficulties noted  Mood: "fine"  Affect: subdued, low key, reactive, appropriate  Thought Process: linear, goal-directed, organized, logical  Memory: able to register 3/3 words and recall 3/3 words at 5 minutes   Concentration: able to recite months of year in reverse without error  Associations: intact, no loosening of associations  Thought Content: no suicidal ideation, no homicidal ideation, no paranoid ideation, no ideas of reference, no evidence of delusions or psychosis  Perceptions: no auditory or visual hallucinations  Insight: intact, demonstrates awareness of situation  Judgment: intact, behavior is adequate/appropriate to the circumstances      ASSESSMENT:     DIAGNOSES & PROBLEMS      Patient Active Problem List   Diagnosis    Acute renal failure superimposed on stage 3 chronic kidney disease    Coagulopathy    Thrombocytopenia    History of pulmonary embolus (PE)    AVN (avascular necrosis of bone)    Hydronephrosis of right kidney    Transaminitis    Elevated lipase    Essential hypertension    Alcoholic cirrhosis of liver    CKD (chronic kidney disease) stage 3, GFR 30-59 ml/min    Hip arthritis    Arm pain    Shortness of breath    History of GI bleed    Colitis    Alcohol abuse    Refeeding syndrome    Hematemesis    Alcoholic ketoacidosis    Cirrhosis of liver    Ascites due to alcoholic cirrhosis    Diastolic dysfunction    Jaundice    Severe sepsis without septic shock    Hyperbilirubinemia    Dilation of common bile duct    Chronic left hip " pain    Alcohol withdrawal    Malnutrition    Alcohol use disorder, severe, dependence    Encounter for pre-transplant evaluation for liver transplant    Hepatic encephalopathy    Long-term use of high-risk medication          III[x]III  DIAGNOSES AND PROBLEMS:             .  Alcohol Use Disorder, moderate-severe  Alcoholic Cirrhosis            .  PLAN:      IMPRESSION AND RECOMMENDATIONS:      MANAGEMENT PLAN, TREATMENT GOALS, THERAPEUTIC TECHNIQUES/APPROACHES & CLINICAL REASONING:      - Acamprosate 666 mg PO TID  - message  sent to hepatologist Dr. Veronica about recent labs - CMP and ammonia  - PETH results colected on 9/28/22: 230  - continue Nortriptyline - managed through pain management   - continue lactulose, rifaximin for ammonia - informed of need to take as prescribed and not to miss dosages due to risk of elevated ammonia being neurotoxic and can cause encephalopathy, confusion   - avoid oxycodone at this time - pain management has ok'ed the use of opiates - if pain exacerbated we will revisit if need be - but currently he prefers to avoid - he agrees to speak with us prior to resuming so we can discuss     Disposition:    - Continue ORP.  - Continue ORP protocol.  - Additional workup is planned to address substance use disorder, in order to guide and refine ongoing management options, including serial alcohol and drug laboratory testing (e.g. POCT breath alcohol test, urine toxicology, alcohol biomarkers, PETH).  - Full engagement in 12 step (or equivalent) recovery program(s), including mandatory meeting attendance and acquisition of sponsor.  - Patient counseled on abstinence from alcohol and substances of abuse (illicit and prescription).  - Relapse prevention and motivational interviewing provided.  - MAT for alcohol use disorder was discussed including acamprosate, naltrexone and disulfiram.     III[x]III  PRESCRIPTION DRUG MANAGEMENT:      Prescription Drug Management entails the review,  recommendation, or consideration without recommendation of medications, and as such was employed during the encounter.     Discussed, to the extent possible, diagnosis, risks and benefits of proposed treatment vs alternative treatments vs no treatment, potential side effects of these treatments and the inherent unpredictability of treatment. The patient's ability to understand, participate and engage in a conversation surrounding this was deemed to be: adequate. The patient expresses understanding of the above and displays the capacity to agree with this treatment given said understanding. Patient also agrees that, currently, the benefits outweigh the risks and would like to continue treatment at this time.      Written material has additionally been provided, via the AVS or other pre-printed handouts.         Case discussed with staff addiction psychiatrist: Dr. Brock Braun MD  [facility], PGY-2

## 2022-10-14 NOTE — PLAN OF CARE
10/14/22 1108   Activity/Group Therapy Checklist   Group Other (Comments)  (Communication Skills)   Attendance Attended   Follows Direction Followed directions   Group Interactions/Observations Interacted appropriately;Sharing;Supportive   Affect/Mood Range Normal range   Affect/Mood Display Appropriate   Goal Progression Progressing      and group reviewed Strengths & Qualities worksheet. Patient denied any questions or concerns at this time.

## 2022-10-15 LAB — ETHYL GLUCURONIDE: NEGATIVE NG/ML

## 2022-10-17 ENCOUNTER — HOSPITAL ENCOUNTER (OUTPATIENT)
Dept: PSYCHIATRY | Facility: HOSPITAL | Age: 47
Discharge: HOME | End: 2022-10-17
Payer: MEDICARE

## 2022-10-17 ENCOUNTER — LAB VISIT (OUTPATIENT)
Dept: LAB | Facility: HOSPITAL | Age: 47
End: 2022-10-17
Attending: PSYCHIATRY & NEUROLOGY
Payer: MEDICARE

## 2022-10-17 VITALS
HEART RATE: 87 BPM | RESPIRATION RATE: 18 BRPM | DIASTOLIC BLOOD PRESSURE: 95 MMHG | SYSTOLIC BLOOD PRESSURE: 183 MMHG | TEMPERATURE: 99 F

## 2022-10-17 DIAGNOSIS — Z79.899 LONG-TERM USE OF HIGH-RISK MEDICATION: Primary | ICD-10-CM

## 2022-10-17 DIAGNOSIS — Z79.899 LONG-TERM USE OF HIGH-RISK MEDICATION: ICD-10-CM

## 2022-10-17 DIAGNOSIS — K70.30 ALCOHOLIC CIRRHOSIS, UNSPECIFIED WHETHER ASCITES PRESENT: ICD-10-CM

## 2022-10-17 DIAGNOSIS — F10.20 ALCOHOL USE DISORDER, SEVERE, DEPENDENCE: Primary | ICD-10-CM

## 2022-10-17 LAB
AMPHET+METHAMPHET UR QL: NEGATIVE
BARBITURATES UR QL SCN>200 NG/ML: NEGATIVE
BENZODIAZ UR QL SCN>200 NG/ML: NEGATIVE
BZE UR QL SCN: NEGATIVE
CANNABINOIDS UR QL SCN: NEGATIVE
CREAT UR-MCNC: 259 MG/DL (ref 23–375)
ETHANOL UR-MCNC: <10 MG/DL
METHADONE UR QL SCN>300 NG/ML: NEGATIVE
OPIATES UR QL SCN: NEGATIVE
PCP UR QL SCN>25 NG/ML: NEGATIVE
TOXICOLOGY INFORMATION: NORMAL

## 2022-10-17 PROCEDURE — 36415 COLL VENOUS BLD VENIPUNCTURE: CPT | Performed by: PSYCHIATRY & NEUROLOGY

## 2022-10-17 PROCEDURE — 90853 GROUP PSYCHOTHERAPY: CPT

## 2022-10-17 PROCEDURE — 99232 SBSQ HOSP IP/OBS MODERATE 35: CPT | Mod: ,,, | Performed by: PSYCHIATRY & NEUROLOGY

## 2022-10-17 PROCEDURE — 80307 DRUG TEST PRSMV CHEM ANLYZR: CPT | Mod: 59 | Performed by: PSYCHIATRY & NEUROLOGY

## 2022-10-17 PROCEDURE — 80321 ALCOHOLS BIOMARKERS 1OR 2: CPT | Performed by: PSYCHIATRY & NEUROLOGY

## 2022-10-17 PROCEDURE — 99232 PR SUBSEQUENT HOSPITAL CARE,LEVL II: ICD-10-PCS | Mod: ,,, | Performed by: PSYCHIATRY & NEUROLOGY

## 2022-10-17 PROCEDURE — 90853 GROUP PSYCHOTHERAPY: CPT | Mod: ,,, | Performed by: PSYCHOLOGIST

## 2022-10-17 PROCEDURE — 80307 DRUG TEST PRSMV CHEM ANLYZR: CPT | Performed by: PSYCHIATRY & NEUROLOGY

## 2022-10-17 PROCEDURE — 90853 PR GROUP PSYCHOTHERAPY: ICD-10-PCS | Mod: ,,, | Performed by: PSYCHOLOGIST

## 2022-10-17 PROCEDURE — 90853 GROUP PSYCHOTHERAPY: CPT | Performed by: SOCIAL WORKER

## 2022-10-17 NOTE — PROGRESS NOTES
Group Psychotherapy (PhD/LCSW)    Site: [facility] ([place], [place])    Clinical status of patient: Intensive Outpatient Program (IOP)    Date: 10/17/2022    Group Focus: Intro to Emotions    Length of service: 47543 - 45-50 minutes    Number of patients in attendance: 7    Referred by: [facility] Treatment Team    Target symptoms: Depression, anxiety    Patient's response to treatment: Active Listening and Self-disclosure    Progress toward goals: Progressing adequately    Interval History:  Session focus was introduction to emotions and the three-component model of emotions. Patients were educated on the purpose of emotions, what each common emotion alerts us to, and that each emotion exists on its own separate continuum. The three components of emotions (behavior, thought, physical sensations) and skills used to address each component were reviewed.    Diagnosis:     ICD-10-CM ICD-9-CM   1. Alcohol use disorder, severe, dependence  F10.20 303.90       Plan: Continue treatment on ORP

## 2022-10-17 NOTE — PROGRESS NOTES
"ABU FOLLOW UP VISIT     DEPARTMENT:  Psychiatry  SITE: [facility] [address]    DATE OF ADMISSION: 10/17/2022  8:00 AM    EXAMINING PRACTITIONER: Ross Wiedemann      SUBJECTIVE:     HISTORY    Patient Name: Irvin Diaz Jr.  YOB: 1975    CHIEF COMPLAINT   Irvin Diaz Jr. is a 47 y.o. male who is being seen today for a follow up visit in the ABU intensive outpatient program.  Irvin Diaz Jr. presents with the chief complaint of: problematic substance use/abuse and alcohol and/or drug addiction    HPI & PSYCHIATRIC ROS  Patient reports that he is doing well. Reports had a good weekend. States he was bringing his grandson back to [place] with his wife and youngest son.     Did not go to any meetings over the weekend. Continues to go to online AA meetings. Reports he is attending roughly 4-5 meetings. Continues to try to find a sponsor. Plans on going to aftercare here at [facility]. Reports he is better able to enjoy himself now- states he used to have to drink or smoke marijuana to enjoy a car ride but now he is able to "just enjoy the moment."     Patient reports he has appointment with hepatologist on October 25 - confirmed in chart - encouraged patient to take xifaxin and lactulose as prescribed. Patient reports understanding.     Denies other issues or questions.     WITHDRAWAL SYMPTOMS: none  CRAVINGS: none      PFSH: The patient's past medical history has been reviewed and updated as appropriate within the electronic medical record system.      PSYCHOTROPIC MEDICATIONS   Acamprosate 666 mg PO TID  Nortriptyline 25 mg PO Nightly      OBJECTIVE:     EXAMINATION    Vitals:    10/17/22 0900   BP: (!) 183/95   Pulse: 87   Resp: 18   Temp: 98.8 °F (37.1 °C)         MENTAL STATUS EXAMINATION:  General Appearance: appropriately dressed, adequately groomed  Behavior: cooperative, appropriate eye contact, under good behavioral control  Movements and Motor Activity: no " "abnormal involuntary movements noted, no psychomotor agitation or retardation  Gait and Station: utilizes wheel chair   Speech: normal rate/rhythm/volume/tone, conversational, decreased spontaneity, coherent  Language: fluent English, repeats words/phrases, no word-finding difficulties noted  Mood: "fine"  Affect: subdued, low key, reactive, appropriate  Thought Process: linear, goal-directed, organized, logical  Memory: able to register 3/3 words and recall 3/3 words at 5 minutes   Concentration: able to recite months of year in reverse without error  Associations: intact, no loosening of associations  Thought Content: no suicidal ideation, no homicidal ideation, no paranoid ideation, no ideas of reference, no evidence of delusions or psychosis  Perceptions: no auditory or visual hallucinations  Insight: intact, demonstrates awareness of situation  Judgment: intact, behavior is adequate/appropriate to the circumstances      ASSESSMENT:     DIAGNOSES & PROBLEMS      Patient Active Problem List   Diagnosis    Acute renal failure superimposed on stage 3 chronic kidney disease    Coagulopathy    Thrombocytopenia    History of pulmonary embolus (PE)    AVN (avascular necrosis of bone)    Hydronephrosis of right kidney    Transaminitis    Elevated lipase    Essential hypertension    Alcoholic cirrhosis of liver    CKD (chronic kidney disease) stage 3, GFR 30-59 ml/min    Hip arthritis    Arm pain    Shortness of breath    History of GI bleed    Colitis    Alcohol abuse    Refeeding syndrome    Hematemesis    Alcoholic ketoacidosis    Cirrhosis of liver    Ascites due to alcoholic cirrhosis    Diastolic dysfunction    Jaundice    Severe sepsis without septic shock    Hyperbilirubinemia    Dilation of common bile duct    Chronic left hip pain    Alcohol withdrawal    Malnutrition    Alcohol use disorder, severe, dependence    Encounter for pre-transplant evaluation for liver transplant    Hepatic encephalopathy    " Long-term use of high-risk medication          III[x]III  DIAGNOSES AND PROBLEMS:             .  Alcohol Use Disorder, moderate-severe  Alcoholic Cirrhosis            .  PLAN:      IMPRESSION AND RECOMMENDATIONS:      MANAGEMENT PLAN, TREATMENT GOALS, THERAPEUTIC TECHNIQUES/APPROACHES & CLINICAL REASONING:      - Acamprosate 666 mg PO TID  - message  sent to hepatologist Dr. Veronica about recent labs - CMP and ammonia  - PETH results colected on 9/28/22: 230 - repeat ordered 10/17  - continue Nortriptyline - managed through pain management   - continue lactulose, rifaximin for ammonia - informed of need to take as prescribed and not to miss dosages due to risk of elevated ammonia being neurotoxic and can cause encephalopathy, confusion   - avoid oxycodone at this time - pain management has ok'ed the use of opiates - if pain exacerbated we will revisit if need be - but currently he prefers to avoid - he agrees to speak with us prior to resuming so we can discuss     Disposition:    - Continue ORP.  - Continue ORP protocol.  - Additional workup is planned to address substance use disorder, in order to guide and refine ongoing management options, including serial alcohol and drug laboratory testing (e.g. POCT breath alcohol test, urine toxicology, alcohol biomarkers, PETH).  - Full engagement in 12 step (or equivalent) recovery program(s), including mandatory meeting attendance and acquisition of sponsor.  - Patient counseled on abstinence from alcohol and substances of abuse (illicit and prescription).  - Relapse prevention and motivational interviewing provided.  - MAT for alcohol use disorder was discussed including acamprosate, naltrexone and disulfiram.     III[x]III  PRESCRIPTION DRUG MANAGEMENT:      Prescription Drug Management entails the review, recommendation, or consideration without recommendation of medications, and as such was employed during the encounter.     Discussed, to the extent possible, diagnosis,  risks and benefits of proposed treatment vs alternative treatments vs no treatment, potential side effects of these treatments and the inherent unpredictability of treatment. The patient's ability to understand, participate and engage in a conversation surrounding this was deemed to be: adequate. The patient expresses understanding of the above and displays the capacity to agree with this treatment given said understanding. Patient also agrees that, currently, the benefits outweigh the risks and would like to continue treatment at this time.      Written material has additionally been provided, via the AVS or other pre-printed handouts.         Case discussed with staff addiction psychiatrist: Dr. [place] Ross Wiedemann, MD  [facility] PGY-4  [facility]

## 2022-10-17 NOTE — PROGRESS NOTES
Group Psychotherapy (PhD/LCSW)    Site: [facility]    Clinical status of patient: Intensive Outpatient Program (IOP)    Date: 10/17/2022    Group Focus: Psychodynamic Group Psychotherapy    Length of service: 40717 - 45-50 minutes    Number of patients in attendance: 4    Referred by: Addictive Behavior Unit Treatment Team    Target symptoms: Alcohol Abuse    Patient's response to treatment: Active Listening and Self-disclosure    Progress toward goals: Progressing adequately    Interval History: Pt reports continued sobriety. He reports that he had a very pleasant weekend, and spent a lot of time with his wife and son who are getting to see his improvements from sobriety.    Diagnosis: Alcohol Use Disorder    Plan: Continue treatment on ABU

## 2022-10-17 NOTE — PLAN OF CARE
10/17/22 1218   Activity/Group Therapy Checklist   Group Other (Comments)  (Communication Skills)   Attendance Attended   Follows Direction Followed directions   Group Interactions/Observations Interacted appropriately;Sharing;Supportive   Affect/Mood Range Normal range   Affect/Mood Display Appropriate   Goal Progression Progressing      and group reviewed conflict resolution skills. Patient denied any questions or concerns at this time.

## 2022-10-17 NOTE — PLAN OF CARE
10/17/22 1400   Activity/Group Therapy Checklist   Group Addiction Education   Attendance Attended   Follows Direction Followed directions   Group Interactions/Observations Interacted appropriately   Affect/Mood Range Normal range   Affect/Mood Display Appropriate   Goal Progression Progressing

## 2022-10-17 NOTE — PLAN OF CARE
10/14/22 1400   Activity/Group Therapy Checklist   Group Goals/Reflection   Attendance Attended   Follows Direction Followed directions   Group Interactions/Observations Interacted appropriately   Affect/Mood Range Normal range   Affect/Mood Display Appropriate   Goal Progression Progressing

## 2022-10-18 ENCOUNTER — HOSPITAL ENCOUNTER (OUTPATIENT)
Dept: PSYCHIATRY | Facility: HOSPITAL | Age: 47
Discharge: HOME | End: 2022-10-18
Payer: MEDICARE

## 2022-10-18 DIAGNOSIS — F10.20 ALCOHOL USE DISORDER, SEVERE, DEPENDENCE: Primary | ICD-10-CM

## 2022-10-18 LAB — ETHYL GLUCURONIDE: NEGATIVE NG/ML

## 2022-10-18 PROCEDURE — 90853 GROUP PSYCHOTHERAPY: CPT | Mod: ,,, | Performed by: PSYCHOLOGIST

## 2022-10-18 PROCEDURE — 90853 PR GROUP PSYCHOTHERAPY: ICD-10-PCS | Mod: ,,, | Performed by: PSYCHOLOGIST

## 2022-10-18 PROCEDURE — 90853 GROUP PSYCHOTHERAPY: CPT | Performed by: SOCIAL WORKER

## 2022-10-18 NOTE — PROGRESS NOTES
Group Psychotherapy (PhD/LCSW)    Site: [facility]    Clinical status of patient: Intensive Outpatient Program (IOP)    Date: 10/18/2022    Group Focus: Psychodynamic Group Psychotherapy    Length of service: 95394 - 45-50 minutes    Number of patients in attendance: 5    Referred by: Addictive Behavior Unit Treatment Team    Target symptoms: Alcohol Abuse    Patient's response to treatment: Active Listening and Self-disclosure    Progress toward goals: Progressing adequately    Interval History: Pt reports continued sobriety. He discussed some of his hesitation around getting a sponsor today, and was open to feedback.    Diagnosis: Alcohol Use Disorder    Plan: Continue treatment on ABU

## 2022-10-18 NOTE — PROGRESS NOTES
Group Psychotherapy (PhD/LCSW)    Site: [facility]    Clinical status of patient: Intensive Outpatient Program (IOP)    Date: 10/18/2022    Group Focus: Interpersonal Effectiveness Skills    Length of service: 45-50 minutes    Number of patients in attendance: 9    Referred by: [facility] Treatment Team    Target symptoms: Alcohol use    Patient's response to treatment: Active Listening and Self-disclosure    Progress toward goals: Progressing adequately    Interval History: Session focus was Interpersonal Effectiveness. Patients were introduced to GIVE FAST. Patient engaged with how to be gentle, act interested, validate, use an easy manner, be fair,  no apologies, stick to values, and be truthful.    Diagnosis:     ICD-10-CM ICD-9-CM   1. Alcohol use disorder, severe, dependence  F10.20 303.90           Plan: Continue treatment on ORP

## 2022-10-18 NOTE — PLAN OF CARE
10/18/22 1100   Activity/Group Therapy Checklist   Group Goals/Reflection   Attendance Attended   Follows Direction Followed directions   Group Interactions/Observations Interacted appropriately   Affect/Mood Range Normal range   Affect/Mood Display Appropriate   Goal Progression Progressing

## 2022-10-19 ENCOUNTER — HOSPITAL ENCOUNTER (OUTPATIENT)
Dept: PSYCHIATRY | Facility: HOSPITAL | Age: 47
Discharge: HOME | End: 2022-10-19
Payer: MEDICARE

## 2022-10-19 VITALS
HEART RATE: 84 BPM | RESPIRATION RATE: 18 BRPM | TEMPERATURE: 98 F | DIASTOLIC BLOOD PRESSURE: 90 MMHG | SYSTOLIC BLOOD PRESSURE: 182 MMHG

## 2022-10-19 DIAGNOSIS — Z79.899 LONG-TERM USE OF HIGH-RISK MEDICATION: ICD-10-CM

## 2022-10-19 DIAGNOSIS — F10.20 ALCOHOL USE DISORDER, SEVERE, DEPENDENCE: Primary | ICD-10-CM

## 2022-10-19 DIAGNOSIS — F10.20 ALCOHOL USE DISORDER, SEVERE, DEPENDENCE: Primary | Chronic | ICD-10-CM

## 2022-10-19 DIAGNOSIS — K70.30 ALCOHOLIC CIRRHOSIS, UNSPECIFIED WHETHER ASCITES PRESENT: ICD-10-CM

## 2022-10-19 LAB
AMPHET+METHAMPHET UR QL: NEGATIVE
BARBITURATES UR QL SCN>200 NG/ML: NEGATIVE
BENZODIAZ UR QL SCN>200 NG/ML: NEGATIVE
BZE UR QL SCN: NEGATIVE
CANNABINOIDS UR QL SCN: NEGATIVE
CREAT UR-MCNC: 220 MG/DL (ref 23–375)
ETHANOL UR-MCNC: <10 MG/DL
METHADONE UR QL SCN>300 NG/ML: NEGATIVE
OPIATES UR QL SCN: NEGATIVE
PCP UR QL SCN>25 NG/ML: NEGATIVE
TOXICOLOGY INFORMATION: NORMAL

## 2022-10-19 PROCEDURE — 90853 GROUP PSYCHOTHERAPY: CPT | Performed by: SOCIAL WORKER

## 2022-10-19 PROCEDURE — 90853 PR GROUP PSYCHOTHERAPY: ICD-10-PCS | Mod: ,,, | Performed by: PSYCHOLOGIST

## 2022-10-19 PROCEDURE — 90853 GROUP PSYCHOTHERAPY: CPT | Mod: ,,, | Performed by: PSYCHOLOGIST

## 2022-10-19 PROCEDURE — 90853 GROUP PSYCHOTHERAPY: CPT

## 2022-10-19 PROCEDURE — 80307 DRUG TEST PRSMV CHEM ANLYZR: CPT | Performed by: PSYCHIATRY & NEUROLOGY

## 2022-10-19 PROCEDURE — 80307 DRUG TEST PRSMV CHEM ANLYZR: CPT | Mod: 59 | Performed by: PSYCHIATRY & NEUROLOGY

## 2022-10-19 PROCEDURE — 99232 SBSQ HOSP IP/OBS MODERATE 35: CPT | Mod: ,,, | Performed by: PSYCHIATRY & NEUROLOGY

## 2022-10-19 PROCEDURE — 99232 PR SUBSEQUENT HOSPITAL CARE,LEVL II: ICD-10-PCS | Mod: ,,, | Performed by: PSYCHIATRY & NEUROLOGY

## 2022-10-19 NOTE — PLAN OF CARE
10/19/22 1503   Activity/Group Therapy Checklist   Group Other (Comments)  (Life Story)   Attendance Attended   Follows Direction Followed directions   Group Interactions/Observations Interacted appropriately;Alert;Sharing;Supportive   Affect/Mood Range Normal range   Affect/Mood Display Appropriate   Goal Progression Progressing   Patient presented Life Story to group.

## 2022-10-19 NOTE — PROGRESS NOTES
Group Psychotherapy (PhD/LCSW)    Site: [facility]    Clinical status of patient: Intensive Outpatient Program (IOP)    Date: 10/19/2022    Group Focus: Psychodynamic Group Psychotherapy    Length of service: 26709 - 45-50 minutes    Number of patients in attendance: 5    Referred by: Addictive Behavior Unit Treatment Team    Target symptoms: Alcohol Abuse    Patient's response to treatment: Active Listening and Self-disclosure    Progress toward goals: Progressing adequately    Interval History: Pt reports continued sobriety. He discussed his improvements in relationships with his family members due to sobriety today.    Diagnosis: Alcohol Use Disorder    Plan: Continue treatment on ABU

## 2022-10-19 NOTE — PROGRESS NOTES
"ABU FOLLOW UP VISIT     DEPARTMENT:  Psychiatry  SITE: [facility] [address]    DATE OF ADMISSION: 10/19/2022  8:00 AM    EXAMINING PRACTITIONER: Ross Wiedemann      SUBJECTIVE:     HISTORY    Patient Name: Irvin Diaz Jr.  YOB: 1975    CHIEF COMPLAINT   Irvin Diaz Jr. is a 47 y.o. male who is being seen today for a follow up visit in the ABU intensive outpatient program.  Irvin Diaz Jr. presents with the chief complaint of: problematic substance use/abuse and alcohol and/or drug addiction    HPI & PSYCHIATRIC ROS  Patient reports things are going well. States he thinks he "might have had the wrong idea" about sponsors and discussed this at one of his recent AA meetings. Understands that he can switch sponsors if needed. Reports understanding of goal of this program to have a sponsor and importance of meetings these goals to document his compliance for potential future transplant.     Patient agrees that graduation date of this Friday the 21st is appropriate. Voices his intent to attend aftercare her at [facility].     WITHDRAWAL SYMPTOMS: none  CRAVINGS: none      PFSH: The patient's past medical history has been reviewed and updated as appropriate within the electronic medical record system.      PSYCHOTROPIC MEDICATIONS   Acamprosate 666 mg PO TID  Nortriptyline 25 mg PO Nightly      OBJECTIVE:     EXAMINATION    There were no vitals filed for this visit.        MENTAL STATUS EXAMINATION:  General Appearance: appropriately dressed, adequately groomed  Behavior: cooperative, appropriate eye contact, under good behavioral control  Movements and Motor Activity: no abnormal involuntary movements noted, no psychomotor agitation or retardation  Gait and Station: utilizes wheel chair   Speech: normal rate/rhythm/volume/tone, conversational, decreased spontaneity, coherent  Language: fluent English, repeats words/phrases, no word-finding difficulties noted  Mood: " ""fine"  Affect: subdued, low key, reactive, appropriate  Thought Process: linear, goal-directed, organized, logical  Memory: able to register 3/3 words and recall 3/3 words at 5 minutes   Concentration: able to recite months of year in reverse without error  Associations: intact, no loosening of associations  Thought Content: no suicidal ideation, no homicidal ideation, no paranoid ideation, no ideas of reference, no evidence of delusions or psychosis  Perceptions: no auditory or visual hallucinations  Insight: intact, demonstrates awareness of situation  Judgment: intact, behavior is adequate/appropriate to the circumstances      ASSESSMENT:     DIAGNOSES & PROBLEMS      Patient Active Problem List   Diagnosis    Acute renal failure superimposed on stage 3 chronic kidney disease    Coagulopathy    Thrombocytopenia    History of pulmonary embolus (PE)    AVN (avascular necrosis of bone)    Hydronephrosis of right kidney    Transaminitis    Elevated lipase    Essential hypertension    Alcoholic cirrhosis of liver    CKD (chronic kidney disease) stage 3, GFR 30-59 ml/min    Hip arthritis    Arm pain    Shortness of breath    History of GI bleed    Colitis    Alcohol abuse    Refeeding syndrome    Hematemesis    Alcoholic ketoacidosis    Cirrhosis of liver    Ascites due to alcoholic cirrhosis    Diastolic dysfunction    Jaundice    Severe sepsis without septic shock    Hyperbilirubinemia    Dilation of common bile duct    Chronic left hip pain    Alcohol withdrawal    Malnutrition    Alcohol use disorder, severe, dependence    Encounter for pre-transplant evaluation for liver transplant    Hepatic encephalopathy    Long-term use of high-risk medication          III[x]III  DIAGNOSES AND PROBLEMS:             .  Alcohol Use Disorder, moderate-severe  Alcoholic Cirrhosis            .  PLAN:      IMPRESSION AND RECOMMENDATIONS:      MANAGEMENT PLAN, TREATMENT GOALS, THERAPEUTIC TECHNIQUES/APPROACHES & CLINICAL REASONING: "      - Acamprosate 666 mg PO TID  - message  sent to hepatologist Dr. Veronica about recent labs - CMP and ammonia  - PETH results colected on 9/28/22: 230 - repeat ordered 10/17  - continue Nortriptyline - managed through pain management   - continue lactulose, rifaximin for ammonia - informed of need to take as prescribed and not to miss dosages due to risk of elevated ammonia being neurotoxic and can cause encephalopathy, confusion   - avoid oxycodone at this time - pain management has ok'ed the use of opiates - if pain exacerbated we will revisit if need be - but currently he prefers to avoid - he agrees to speak with us prior to resuming so we can discuss     Disposition:    - Continue ORP.  - Continue ORP protocol.  - Additional workup is planned to address substance use disorder, in order to guide and refine ongoing management options, including serial alcohol and drug laboratory testing (e.g. POCT breath alcohol test, urine toxicology, alcohol biomarkers, PETH).  - Full engagement in 12 step (or equivalent) recovery program(s), including mandatory meeting attendance and acquisition of sponsor.  - Patient counseled on abstinence from alcohol and substances of abuse (illicit and prescription).  - Relapse prevention and motivational interviewing provided.  - MAT for alcohol use disorder was discussed including acamprosate, naltrexone and disulfiram.     III[x]III  PRESCRIPTION DRUG MANAGEMENT:      Prescription Drug Management entails the review, recommendation, or consideration without recommendation of medications, and as such was employed during the encounter.     Discussed, to the extent possible, diagnosis, risks and benefits of proposed treatment vs alternative treatments vs no treatment, potential side effects of these treatments and the inherent unpredictability of treatment. The patient's ability to understand, participate and engage in a conversation surrounding this was deemed to be: adequate. The  patient expresses understanding of the above and displays the capacity to agree with this treatment given said understanding. Patient also agrees that, currently, the benefits outweigh the risks and would like to continue treatment at this time.      Written material has additionally been provided, via the AVS or other pre-printed handouts.         Case discussed with staff addiction psychiatrist: Dr. Galraneau Ross Wiedemann, MD  [facility] PGY-4  [facility]

## 2022-10-19 NOTE — PROGRESS NOTES
Group Psychotherapy (PhD/LCSW)    Site: [facility]    Clinical status of patient: Intensive Outpatient Program (IOP)    Date: 10/19/2022     Group Focus: Distress Tolerance    Length of service: 20335 - 45-50 minutes    Number of patients in attendance: 10    Referred by: [facility]    Target symptoms: Substance Abuse, Depression and Anxiety    Patient's response to treatment: Active Listening and Self-disclosure    Progress toward goals: Progressing adequately    Interval History: Session focus was Distress Tolerance:  IMPROVE.  Patients were encouraged to use imagery, find meaning, use prayer, relax, focus on one thing in the moment, take a brief vacation, and use self-encouragement.    Diagnosis:     ICD-10-CM ICD-9-CM   1. Alcohol use disorder, severe, dependence  F10.20 303.90   2. Alcoholic cirrhosis, unspecified whether ascites present  K70.30 571.2       Plan: Continue treatment on ORP

## 2022-10-19 NOTE — PATIENT CARE CONFERENCE
Diagnosis:   Alcohol Use Disorder, moderate-severe  Alcoholic Cirrhosis    Progress:  Patient staffed by team. Patient scheduled to be discharged/complete program on Friday (10/21/22). Patient has not secured a sponsor. Patient has been appropriate in groups. Team to continue to monitor patient's progress.     Plan of Care:   Patient to continue ORP with group and individual therapies until scheduled discharge (10/21).    Aftercare/followup:   Med Management: 10/31 @ 2:00pm with Dairusz Doyle NP  Psychotherapy: Jacqui Hernandez LCSW    Staff Present:    MD Ciarra Bernardo, PhD  Teja Feldman, [facility]  Torie Presley, [facility]  Jacqui Hernandez, [facility]  Sheron George, W  Ajay Spencer LPN    Resident: Yair Castle MD

## 2022-10-20 ENCOUNTER — HOSPITAL ENCOUNTER (OUTPATIENT)
Dept: PSYCHIATRY | Facility: HOSPITAL | Age: 47
Discharge: HOME | End: 2022-10-20
Payer: MEDICARE

## 2022-10-20 DIAGNOSIS — Z79.899 LONG-TERM USE OF HIGH-RISK MEDICATION: ICD-10-CM

## 2022-10-20 DIAGNOSIS — F10.20 ALCOHOL USE DISORDER, SEVERE, DEPENDENCE: Primary | ICD-10-CM

## 2022-10-20 PROCEDURE — 90853 PR GROUP PSYCHOTHERAPY: ICD-10-PCS | Mod: ,,, | Performed by: PSYCHOLOGIST

## 2022-10-20 PROCEDURE — 90853 GROUP PSYCHOTHERAPY: CPT | Mod: ,,, | Performed by: PSYCHOLOGIST

## 2022-10-20 PROCEDURE — 90853 GROUP PSYCHOTHERAPY: CPT | Performed by: SOCIAL WORKER

## 2022-10-20 NOTE — PROGRESS NOTES
Group Psychotherapy (PhD/LCSW)    Site: [facility]    Clinical status of patient: Intensive Outpatient Program (IOP)    Date: 10/20/2022    Group Focus: Distress Tolerance Skills    Length of service: 71898 - 45-50 minutes    Number of patients in attendance: 10    Referred by: [facility] Treatment Team    Target symptoms: Alcohol use    Patient's response to treatment: Active Listening and Self-disclosure    Progress toward goals: Progressing adequately    Interval History: Session focus was Emotion Regulation:  Opposite Action.  Patients identified action urges for each emotion and learned how to engage in opposite action when the emotions are not justified or unhelpful.    Diagnosis:     ICD-10-CM ICD-9-CM   1. Alcohol use disorder, severe, dependence  F10.20 303.90   2. Long-term use of high-risk medication  Z79.899 V58.69       Plan: Continue treatment on ORP

## 2022-10-20 NOTE — PROGRESS NOTES
Group Psychotherapy (PhD/LCSW)    Site: [facility]    Clinical status of patient: Intensive Outpatient Program (IOP)    Date: 10/20/2022    Group Focus: Psychodynamic Group Psychotherapy    Length of service: 93342 - 45-50 minutes    Number of patients in attendance: 4    Referred by: Addictive Behavior Unit Treatment Team    Target symptoms: Alcohol Abuse    Patient's response to treatment: Active Listening and Self-disclosure    Progress toward goals: Progressing adequately    Interval History: Pt reports continued sobriety. He shared his story with a new group member and helped acclimate her to treatment. He shared some apprehension and excitement about graduation tomorrow.    Diagnosis: Alcohol Use Disorder    Plan: Continue treatment on ABU

## 2022-10-20 NOTE — PROGRESS NOTES
Art Therapy Techniques Group (with Psychology Intern)    Site: [facility]    Clinical status of patient: Intensive Outpatient Program (IOP)    Date: 10/20/2022    Group Focus: Psychodynamic Group Psychotherapy    Length of service: 56758 - 45-50 minutes    Number of patients in attendance: 4    Referred by: Addictive Behavior Unit Treatment Team    Target symptoms: Substance Abuse    Patient's response to treatment: Active Listening, Feedback given to another patient    Progress toward goals: Progressing adequately    Interval History: Intern provided psychoeducation on the reason for and helpful properties associated with using art therapy techniques. 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 Patient was given feedback by other group members. He further participated in the creative assignment for the week.    Diagnosis: Alcohol Use Disorder    Plan: Continue treatment on ABU

## 2022-10-21 ENCOUNTER — HOSPITAL ENCOUNTER (OUTPATIENT)
Dept: PSYCHIATRY | Facility: HOSPITAL | Age: 47
Discharge: HOME | End: 2022-10-21
Payer: MEDICARE

## 2022-10-21 VITALS
SYSTOLIC BLOOD PRESSURE: 144 MMHG | DIASTOLIC BLOOD PRESSURE: 78 MMHG | TEMPERATURE: 98 F | HEART RATE: 72 BPM | RESPIRATION RATE: 18 BRPM

## 2022-10-21 DIAGNOSIS — F10.20 ALCOHOL USE DISORDER, SEVERE, DEPENDENCE: Primary | ICD-10-CM

## 2022-10-21 DIAGNOSIS — G89.29 CHRONIC LEFT HIP PAIN: ICD-10-CM

## 2022-10-21 DIAGNOSIS — F10.20 ALCOHOL USE DISORDER, SEVERE, DEPENDENCE: Primary | Chronic | ICD-10-CM

## 2022-10-21 DIAGNOSIS — M25.552 CHRONIC LEFT HIP PAIN: ICD-10-CM

## 2022-10-21 DIAGNOSIS — Z79.899 LONG-TERM USE OF HIGH-RISK MEDICATION: ICD-10-CM

## 2022-10-21 DIAGNOSIS — K70.30 ALCOHOLIC CIRRHOSIS, UNSPECIFIED WHETHER ASCITES PRESENT: ICD-10-CM

## 2022-10-21 LAB
AMPHET+METHAMPHET UR QL: NEGATIVE
BARBITURATES UR QL SCN>200 NG/ML: NEGATIVE
BENZODIAZ UR QL SCN>200 NG/ML: NEGATIVE
BZE UR QL SCN: NEGATIVE
CANNABINOIDS UR QL SCN: NEGATIVE
CLINICAL BIOCHEMIST REVIEW: NORMAL
CREAT UR-MCNC: 151 MG/DL (ref 23–375)
ETHANOL UR-MCNC: <10 MG/DL
ETHYL GLUCURONIDE: NEGATIVE NG/ML
METHADONE UR QL SCN>300 NG/ML: NEGATIVE
OPIATES UR QL SCN: NEGATIVE
PCP UR QL SCN>25 NG/ML: NEGATIVE
PETH 16:0/18.1 (POPETH): 48 NG/ML
PETH 16:0/18.2 (PLPETH): <10 NG/ML
TOXICOLOGY INFORMATION: NORMAL

## 2022-10-21 PROCEDURE — 90853 GROUP PSYCHOTHERAPY: CPT | Mod: ,,, | Performed by: PSYCHOLOGIST

## 2022-10-21 PROCEDURE — 80307 DRUG TEST PRSMV CHEM ANLYZR: CPT | Performed by: PSYCHIATRY & NEUROLOGY

## 2022-10-21 PROCEDURE — 99238 PR HOSPITAL DISCHARGE DAY,<30 MIN: ICD-10-PCS | Mod: ,,, | Performed by: PSYCHIATRY & NEUROLOGY

## 2022-10-21 PROCEDURE — 90853 PR GROUP PSYCHOTHERAPY: ICD-10-PCS | Mod: ,,, | Performed by: PSYCHOLOGIST

## 2022-10-21 PROCEDURE — 90853 GROUP PSYCHOTHERAPY: CPT

## 2022-10-21 PROCEDURE — 90853 GROUP PSYCHOTHERAPY: CPT | Performed by: SOCIAL WORKER

## 2022-10-21 PROCEDURE — 99238 HOSP IP/OBS DSCHRG MGMT 30/<: CPT | Mod: ,,, | Performed by: PSYCHIATRY & NEUROLOGY

## 2022-10-21 NOTE — DISCHARGE SUMMARY
NAME: Irvin Diaz Jr.  : 1975  MRN: [id number]  DATE OF ADMISSION: 2022  DATE OF DISCHARGE: 10/21/2022     Attending Physician: Thaddeus Odonnell MD    Resident/Fellow: Ross Wiedemann     Addiction Behavioral Unit Intensive Outpatient Program  Discharge Summary     DIAGNOSES            Patient Active Problem List   Diagnosis    Acute renal failure superimposed on stage 3 chronic kidney disease    Coagulopathy    Thrombocytopenia    History of pulmonary embolus (PE)    AVN (avascular necrosis of bone)    Hydronephrosis of right kidney    Transaminitis    Elevated lipase    Essential hypertension    Alcoholic cirrhosis of liver    CKD (chronic kidney disease) stage 3, GFR 30-59 ml/min    Hip arthritis    Arm pain    Shortness of breath    History of GI bleed    Colitis    Alcohol abuse    Refeeding syndrome    Hematemesis    Alcoholic ketoacidosis    Cirrhosis of liver    Ascites due to alcoholic cirrhosis    Diastolic dysfunction    Jaundice    Severe sepsis without septic shock    Hyperbilirubinemia    Dilation of common bile duct    Chronic left hip pain    Alcohol withdrawal    Malnutrition    Alcohol use disorder, severe, dependence    Encounter for pre-transplant evaluation for liver transplant    Hepatic encephalopathy    Long-term use of high-risk medication         HISTORY OF PRESENT ILLNESS ON ADMIT  Pt states that he is coming to IOP / al;cohol issues. Pt has had sever alcohol use for sometime now and has jhad issues with cirrohsis for a while. Pt noticed it was a major issue when had esophageal varices and had to be hospitalized. Pt states he has been talking with hepatology and his PCP and realizes now the need for sobriety. Pt was a pint per day drinker until hospitalization in July. Pt was also smoking every now and then back then. Since July pt has had complete abstinence except for 1 pint on Saturday. Pt positive for canabis on UDS and states this is from people around him smoking  from he has been told. Pt denies all other use. Pt denies any psych hx. Pt is on TCA for pain and it is manged by painmgmt. PT ROS positive for sleep issues. Sleeps 3-4 hrs per night and takes frequent naps. Pt states his timing has shifted to where he sleeps more during the day. Rest of ROS negative. Pt denies any SI HI AVH or other psychtoic/manic phenomena. Resrt of hx as outlined below.     COURSE OF TREATMENT THROUGHOUT PROGRAM  Patient admitted to ABU for severe alcohol use with hepatic cirrhosis. Patient able to attend program appropriately and slowly embraced goals of program. Over time, patient became more vocal in group meetings and able to demonstrate appropriate participation and affect towards his alcohol use recovery. Patient progressing through goals of program and attending AA and other meetings weekly through virtual platforms. Patient initially resistant to having a sponsor, however he slowly became more open to the idea as he learned what a sponsor serves as and how it can be an additional leg of support for addiction recovery. Patient biomarkers for drug and alcohol use consistently negative, indicating appropriate abstinence from all substances during the program.      On day of discharge, patient reports he feels apprehensive about being discharged but also reports enthusiasm to start the next phase of his recovery. States he is meeting with a potential sponsor this Sunday that he met through an AA meeting. States he plans to attend weekly aftercare meetings as well. Denies cravings to use at this time. Reports enthusiasm regarding this plan.      AFTERCARE PLAN  Patient to attend [facility] substance use aftercare program, will continue with AA and other substance use support meetings         DAY OF DISCHARGE EXAMINATION     Vitals       Vitals:     10/21/22 0900   BP: (!) 144/78   Pulse: 72   Resp: 18   Temp: 97.7 °F (36.5 °C)               CONSTITUTIONAL  General Appearance: casually dressed,  appropriate weight, in no acute distress      MUSCULOSKELETAL  Abnormal Involuntary Movements: n/a      PSYCHIATRIC   Orientation: person, place, time, situation  Speech: normal rate, tone, volume  Language: english, fluent  Mood: good   Affect: appropriate, mood congruent   Thought Process: linear, organized   Associations: intact   Thought Content: future oriented, help seeking, no SI/HI/AVH  Memory: recent and remote intact, intact to conversation   Attention: intact to conversation   Fund of Knowledge: appropriate, aware of current events   Insight: good   Judgment: good     RISK PARAMATERS  Patient reports no suicidal ideation  Patient reports no homicidal ideation  Patient reports no self-injurious behavior  Patient reports no violent behavior        DISCHARGE INSTRUCTIONS     DIET  No restrictions     ACTIVITY  Activity as tolerated     CONDITION  Stable     MEDICATIONS  -continue acamprosate 666mg PO TID   -continue nortriptyline 25mg PO nightly (prescribed for pain)   -informed to continue lactulose, xifaxin and other medications as prescribed      Take all medications as prescribed.  Follow up with outpatient mental health provider as discussed with treatment team.  Talk to your provider about any concerns or side effects with your medications.  Patient counseled on abstinence from alcohol and substances of abuse (illicit and prescription).  Relapse prevention and motivational interviewing provided.  Call the crisis line at: 1-505.632.4124 for help in a crisis and emergent situations and present to the Emergency Department for any worsening of psychiatric symptoms or any suicidal or homicidal ideation.     PRESCRIPTION DRUG MANAGEMENT  - The risks and benefits of medication were discussed with this patient.  - Possible expectable adverse effects of any current or proposed individual psychotropic agents were discussed with this patient.  - Counseling was provided on the importance of full compliance with  medication regimens.        Case discussed with staff Dr. Galarneau, MD Ross Wiedemann, MD  [facility] PGY-4  [facility] [address]         STAFF NOTE    The patient was seen by me, the chart was reviewed, and the case was discussed with the resident.  I agree with the above assessment and plan.    Patient has successfully completed the ORP, maintaining his sobriety.  He will enter aftercare program.  Dispo plans reviewed with patient.    Thaddeus Odonnell MD

## 2022-10-21 NOTE — PLAN OF CARE
10/21/22 1104   Activity/Group Therapy Checklist   Group Wellness   Attendance Attended   Follows Direction Followed directions   Group Interactions/Observations Interacted appropriately;Supportive;Sharing   Affect/Mood Range Normal range   Affect/Mood Display Appropriate   Goal Progression Progressing      and SW Intern reviewed [place] of [employer] worksheets with group. Patient denied any questions or concerns at this time.

## 2022-10-21 NOTE — PROGRESS NOTES
Group Psychotherapy (PhD/LCSW)    Site: [facility]    Clinical status of patient: Intensive Outpatient Program (IOP)    Date: 10/21/2022    Group Focus: Psychodynamic Group Psychotherapy    Length of service: 07560 - 45-50 minutes    Number of patients in attendance: 4    Referred by: Addictive Behavior Unit Treatment Team    Target symptoms: Alcohol Abuse    Patient's response to treatment: Active Listening and Self-disclosure    Progress toward goals: Progressing adequately    Interval History: Pt reports continued sobriety. He reviewed his progress in treatment and discussed future plans.    Diagnosis: Alcohol Use Disorder    Plan: Complete IOP; follow up with aftercare plan.

## 2022-10-21 NOTE — PROGRESS NOTES
NAME: Irvin Diaz Jr.  : 1975  MRN: [id number]  DATE OF ADMISSION: 2022  DATE OF DISCHARGE: 10/21/2022    Attending Physician: Thaddeus Odonnell MD    Resident/Fellow: Ross Wiedemann    [facility]  Discharge Summary    DIAGNOSES      Patient Active Problem List   Diagnosis    Acute renal failure superimposed on stage 3 chronic kidney disease    Coagulopathy    Thrombocytopenia    History of pulmonary embolus (PE)    AVN (avascular necrosis of bone)    Hydronephrosis of right kidney    Transaminitis    Elevated lipase    Essential hypertension    Alcoholic cirrhosis of liver    CKD (chronic kidney disease) stage 3, GFR 30-59 ml/min    Hip arthritis    Arm pain    Shortness of breath    History of GI bleed    Colitis    Alcohol abuse    Refeeding syndrome    Hematemesis    Alcoholic ketoacidosis    Cirrhosis of liver    Ascites due to alcoholic cirrhosis    Diastolic dysfunction    Jaundice    Severe sepsis without septic shock    Hyperbilirubinemia    Dilation of common bile duct    Chronic left hip pain    Alcohol withdrawal    Malnutrition    Alcohol use disorder, severe, dependence    Encounter for pre-transplant evaluation for liver transplant    Hepatic encephalopathy    Long-term use of high-risk medication       HISTORY OF PRESENT ILLNESS ON ADMIT  Pt states that he is coming to IOP / al;cohol issues. Pt has had sever alcohol use for sometime now and has jhad issues with cirrohsis for a while. Pt noticed it was a major issue when had esophageal varices and had to be hospitalized. Pt states he has been talking with hepatology and his PCP and realizes now the need for sobriety. Pt was a pint per day drinker until hospitalization in July. Pt was also smoking every now and then back then. Since July pt has had complete abstinence except for 1 pint on Saturday. Pt positive for canabis on UDS and states this is from people around him smoking from he has  been told. Pt denies all other use. Pt denies any psych hx. Pt is on TCA for pain and it is manged by painmgmt. PT ROS positive for sleep issues. Sleeps 3-4 hrs per night and takes frequent naps. Pt states his timing has shifted to where he sleeps more during the day. Rest of ROS negative. Pt denies any SI HI AVH or other psychtoic/manic phenomena. Resrt of hx as outlined below.    COURSE OF TREATMENT THROUGHOUT PROGRAM  Patient admitted to ABU for severe alcohol use with hepatic cirrhosis. Patient able to attend program appropriately and slowly embraced goals of program. Over time, patient became more vocal in group meetings and able to demonstrate appropriate participation and affect towards his alcohol use recovery. Patient progressing through goals of program and attending AA and other meetings weekly through virtual platforms. Patient initially resistant to having a sponsor, however he slowly became more open to the idea as he learned what a sponsor serves as and how it can be an additional leg of support for addiction recovery. Patient biomarkers for drug and alcohol use consistently negative, indicating appropriate abstinence from all substances during the program.     On day of discharge, patient reports he feels apprehensive about being discharged but also reports enthusiasm to start the next phase of his recovery. States he is meeting with a potential sponsor this Sunday that he met through an AA meeting. States he plans to attend weekly aftercare meetings as well. Denies cravings to use at this time. Reports enthusiasm regarding this plan.     AFTERCARE PLAN  Patient to attend [facility] substance use aftercare program, will continue with AA and other substance use support meetings       DAY OF DISCHARGE EXAMINATION    Vitals:    10/21/22 0900   BP: (!) 144/78   Pulse: 72   Resp: 18   Temp: 97.7 °F (36.5 °C)         CONSTITUTIONAL  General Appearance: casually dressed, appropriate weight, in no acute  distress     MUSCULOSKELETAL  Abnormal Involuntary Movements: n/a     PSYCHIATRIC   Orientation: person, place, time, situation  Speech: normal rate, tone, volume  Language: english, fluent  Mood: good   Affect: appropriate, mood congruent   Thought Process: linear, organized   Associations: intact   Thought Content: future oriented, help seeking, no SI/HI/AVH  Memory: recent and remote intact, intact to conversation   Attention: intact to conversation   Fund of Knowledge: appropriate, aware of current events   Insight: good   Judgment: good    RISK PARAMATERS  Patient reports no suicidal ideation  Patient reports no homicidal ideation  Patient reports no self-injurious behavior  Patient reports no violent behavior      DISCHARGE INSTRUCTIONS    DIET  No restrictions    ACTIVITY  Activity as tolerated    CONDITION  Stable    MEDICATIONS  -continue acamprosate 666mg PO TID   -continue nortriptyline 25mg PO nightly (prescribed for pain)   -informed to continue lactulose, xifaxin and other medications as prescribed     Take all medications as prescribed.  Follow up with outpatient mental health provider as discussed with treatment team.  Talk to your provider about any concerns or side effects with your medications.  Patient counseled on abstinence from alcohol and substances of abuse (illicit and prescription).  Relapse prevention and motivational interviewing provided.  Call the crisis line at: [phone] for help in a crisis and emergent situations and present to the Emergency Department for any worsening of psychiatric symptoms or any suicidal or homicidal ideation.    PRESCRIPTION DRUG MANAGEMENT  - The risks and benefits of medication were discussed with this patient.  - Possible expectable adverse effects of any current or proposed individual psychotropic agents were discussed with this patient.  - Counseling was provided on the importance of full compliance with medication regimens.      Case discussed with  staff Dr. Galarneau, MD Ross Wiedemann, MD  [facility] PGY-4  [facility]       LABS  [unfilled]

## 2022-10-21 NOTE — DISCHARGE SUMMARY
NAME: Irvin Diaz Jr.  : 1975  MRN: [id number]  DATE OF ADMISSION: 2022  DATE OF DISCHARGE: 10/21/2022    Attending Physician: Thaddeus Odonnell MD    Resident/Fellow: Ross Wiedemann    Add[facility] Intensive Outpatient Program  Discharge Summary    DIAGNOSES      Patient Active Problem List  Diagnosis   Acute renal failure superimposed on stage 3 chronic kidney disease   Coagulopathy   Thrombocytopenia   History of pulmonary embolus (PE)   AVN (avascular necrosis of bone)   Hydronephrosis of right kidney   Transaminitis   Elevated lipase   Essential hypertension   Alcoholic cirrhosis of liver   CKD (chronic kidney disease) stage 3, GFR 30-59 ml/min   Hip arthritis   Arm pain   Shortness of breath   History of GI bleed   Colitis   Alcohol abuse   Refeeding syndrome   Hematemesis   Alcoholic ketoacidosis   Cirrhosis of liver   Ascites due to alcoholic cirrhosis   Diastolic dysfunction   Jaundice   Severe sepsis without septic shock   Hyperbilirubinemia   Dilation of common bile duct   Chronic left hip pain   Alcohol withdrawal   Malnutrition   Alcohol use disorder, severe, dependence   Encounter for pre-transplant evaluation for liver transplant   Hepatic encephalopathy   Long-term use of high-risk medication      HISTORY OF PRESENT ILLNESS ON ADMIT  Pt states that he is coming to IOP / al;cohol issues. Pt has had sever alcohol use for sometime now and has jhad issues with cirrohsis for a while. Pt noticed it was a major issue when had esophageal varices and had to be hospitalized. Pt states he has been talking with hepatology and his PCP and realizes now the need for sobriety. Pt was a pint per day drinker until hospitalization in July. Pt was also smoking every now and then back then. Since July pt has had complete abstinence except for 1 pint on Saturday. Pt positive for canabis on UDS and states this is from people around him smoking from he has been told. Pt denies all other use.  Pt denies any psych hx. Pt is on TCA for pain and it is manged by painmgmt. PT ROS positive for sleep issues. Sleeps 3-4 hrs per night and takes frequent naps. Pt states his timing has shifted to where he sleeps more during the day. Rest of ROS negative. Pt denies any SI HI AVH or other psychtoic/manic phenomena. Resrt of hx as outlined below.    COURSE OF TREATMENT THROUGHOUT PROGRAM  Patient admitted to ABU for severe alcohol use with hepatic cirrhosis. Patient able to attend program appropriately and slowly embraced goals of program. Over time, patient became more vocal in group meetings and able to demonstrate appropriate participation and affect towards his alcohol use recovery. Patient progressing through goals of program and attending AA and other meetings weekly through virtual platforms. Patient initially resistant to having a sponsor, however he slowly became more open to the idea as he learned what a sponsor serves as and how it can be an additional leg of support for addiction recovery. Patient biomarkers for drug and alcohol use consistently negative, indicating appropriate abstinence from all substances during the program.     On day of discharge, patient reports he feels apprehensive about being discharged but also reports enthusiasm to start the next phase of his recovery. States he is meeting with a potential sponsor this Sunday that he met through an AA meeting. States he plans to attend weekly aftercare meetings as well. Denies cravings to use at this time. Reports enthusiasm regarding this plan.     AFTERCARE PLAN  Patient to attend [facility] substance use aftercare program, will continue with AA and other substance use support meetings       DAY OF DISCHARGE EXAMINATION    Vitals:   10/21/22 0900  BP: (!) 144/78  Pulse: 72  Resp: 18  Temp: 97.7 °F (36.5 °C)        CONSTITUTIONAL  General Appearance: casually dressed, appropriate weight, in no acute distress     MUSCULOSKELETAL  Abnormal  Involuntary Movements: n/a     PSYCHIATRIC   Orientation: person, place, time, situation  Speech: normal rate, tone, volume  Language: english, fluent  Mood: good   Affect: appropriate, mood congruent   Thought Process: linear, organized   Associations: intact   Thought Content: future oriented, help seeking, no SI/HI/AVH  Memory: recent and remote intact, intact to conversation   Attention: intact to conversation   Fund of Knowledge: appropriate, aware of current events   Insight: good   Judgment: good    RISK PARAMATERS  Patient reports no suicidal ideation  Patient reports no homicidal ideation  Patient reports no self-injurious behavior  Patient reports no violent behavior      DISCHARGE INSTRUCTIONS    DIET  No restrictions    ACTIVITY  Activity as tolerated    CONDITION  Stable    MEDICATIONS  -continue acamprosate 666mg PO TID   -continue nortriptyline 25mg PO nightly (prescribed for pain)   -informed to continue lactulose, xifaxin and other medications as prescribed     ? Take all medications as prescribed.  ? Follow up with outpatient mental health provider as discussed with treatment team.  ? Talk to your provider about any concerns or side effects with your medications.  ? Patient counseled on abstinence from alcohol and substances of abuse (illicit and prescription).  ? Relapse prevention and motivational interviewing provided.  ? Call the crisis line at: [phone] for help in a crisis and emergent situations and present to the Emergency Department for any worsening of psychiatric symptoms or any suicidal or homicidal ideation.    PRESCRIPTION DRUG MANAGEMENT  - The risks and benefits of medication were discussed with this patient.  - Possible expectable adverse effects of any current or proposed individual psychotropic agents were discussed with this patient.  - Counseling was provided on the importance of full compliance with medication regimens.      Case discussed with staff Dr. Odonnell,  MD Ross Wiedemann, MD  [facility] PGY-4  [facility]       LABS  [unfilled]

## 2022-10-22 LAB — ETHYL GLUCURONIDE: NEGATIVE NG/ML

## 2022-10-24 NOTE — PLAN OF CARE
10/19/22 1100   Activity/Group Therapy Checklist   Group Goals/Reflection   Attendance Attended   Follows Direction Followed directions   Group Interactions/Observations Interacted appropriately   Affect/Mood Range Normal range   Affect/Mood Display Appropriate   Goal Progression Progressing

## 2022-10-24 NOTE — PLAN OF CARE
10/21/22 1400   Activity/Group Therapy Checklist   Group Goals/Reflection   Attendance Attended   Follows Direction Followed directions   Group Interactions/Observations Interacted appropriately   Affect/Mood Range Normal range   Affect/Mood Display Appropriate   Goal Progression Progressing

## 2022-10-24 NOTE — PLAN OF CARE
10/20/22 1400   Activity/Group Therapy Checklist   Group Goals/Reflection   Attendance Attended   Follows Direction Followed directions   Group Interactions/Observations Interacted appropriately   Affect/Mood Range Normal range   Affect/Mood Display Appropriate   Goal Progression Progressing

## 2022-10-25 ENCOUNTER — LAB VISIT (OUTPATIENT)
Dept: LAB | Facility: HOSPITAL | Age: 47
End: 2022-10-25
Payer: MEDICARE

## 2022-10-25 ENCOUNTER — PATIENT MESSAGE (OUTPATIENT)
Dept: HEPATOLOGY | Facility: CLINIC | Age: 47
End: 2022-10-25

## 2022-10-25 ENCOUNTER — CLINICAL SUPPORT (OUTPATIENT)
Dept: INFECTIOUS DISEASES | Facility: CLINIC | Age: 47
End: 2022-10-25
Payer: MEDICARE

## 2022-10-25 ENCOUNTER — OFFICE VISIT (OUTPATIENT)
Dept: HEPATOLOGY | Facility: CLINIC | Age: 47
End: 2022-10-25
Payer: MEDICARE

## 2022-10-25 VITALS
SYSTOLIC BLOOD PRESSURE: 145 MMHG | TEMPERATURE: 98 F | HEART RATE: 78 BPM | OXYGEN SATURATION: 100 % | DIASTOLIC BLOOD PRESSURE: 80 MMHG

## 2022-10-25 DIAGNOSIS — Z76.82 ORGAN TRANSPLANT CANDIDATE: ICD-10-CM

## 2022-10-25 DIAGNOSIS — K74.60 HEPATIC CIRRHOSIS, UNSPECIFIED HEPATIC CIRRHOSIS TYPE, UNSPECIFIED WHETHER ASCITES PRESENT: ICD-10-CM

## 2022-10-25 DIAGNOSIS — K70.30 ALCOHOLIC CIRRHOSIS OF LIVER WITHOUT ASCITES: Primary | ICD-10-CM

## 2022-10-25 LAB
ALBUMIN SERPL BCP-MCNC: 2.8 G/DL (ref 3.5–5.2)
ALP SERPL-CCNC: 137 U/L (ref 55–135)
ALT SERPL W/O P-5'-P-CCNC: 23 U/L (ref 10–44)
ANION GAP SERPL CALC-SCNC: 7 MMOL/L (ref 8–16)
AST SERPL-CCNC: 49 U/L (ref 10–40)
BASOPHILS # BLD AUTO: 0.02 K/UL (ref 0–0.2)
BASOPHILS NFR BLD: 0.4 % (ref 0–1.9)
BILIRUB SERPL-MCNC: 4 MG/DL (ref 0.1–1)
BUN SERPL-MCNC: 23 MG/DL (ref 6–20)
CALCIUM SERPL-MCNC: 9.2 MG/DL (ref 8.7–10.5)
CHLORIDE SERPL-SCNC: 107 MMOL/L (ref 95–110)
CO2 SERPL-SCNC: 24 MMOL/L (ref 23–29)
CREAT SERPL-MCNC: 1.6 MG/DL (ref 0.5–1.4)
DIFFERENTIAL METHOD: ABNORMAL
EOSINOPHIL # BLD AUTO: 0.1 K/UL (ref 0–0.5)
EOSINOPHIL NFR BLD: 2.3 % (ref 0–8)
ERYTHROCYTE [DISTWIDTH] IN BLOOD BY AUTOMATED COUNT: 15.9 % (ref 11.5–14.5)
EST. GFR  (NO RACE VARIABLE): 52.8 ML/MIN/1.73 M^2
GLUCOSE SERPL-MCNC: 106 MG/DL (ref 70–110)
HCT VFR BLD AUTO: 28.5 % (ref 40–54)
HGB BLD-MCNC: 9.5 G/DL (ref 14–18)
IMM GRANULOCYTES # BLD AUTO: 0.01 K/UL (ref 0–0.04)
IMM GRANULOCYTES NFR BLD AUTO: 0.2 % (ref 0–0.5)
INR PPP: 1.3 (ref 0.8–1.2)
LYMPHOCYTES # BLD AUTO: 1 K/UL (ref 1–4.8)
LYMPHOCYTES NFR BLD: 18.4 % (ref 18–48)
MCH RBC QN AUTO: 34.1 PG (ref 27–31)
MCHC RBC AUTO-ENTMCNC: 33.3 G/DL (ref 32–36)
MCV RBC AUTO: 102 FL (ref 82–98)
MONOCYTES # BLD AUTO: 0.5 K/UL (ref 0.3–1)
MONOCYTES NFR BLD: 9 % (ref 4–15)
NEUTROPHILS # BLD AUTO: 3.7 K/UL (ref 1.8–7.7)
NEUTROPHILS NFR BLD: 69.7 % (ref 38–73)
NRBC BLD-RTO: 0 /100 WBC
PLATELET # BLD AUTO: 74 K/UL (ref 150–450)
PMV BLD AUTO: 11 FL (ref 9.2–12.9)
POTASSIUM SERPL-SCNC: 3.8 MMOL/L (ref 3.5–5.1)
PROT SERPL-MCNC: 8 G/DL (ref 6–8.4)
PROTHROMBIN TIME: 13.4 SEC (ref 9–12.5)
RBC # BLD AUTO: 2.79 M/UL (ref 4.6–6.2)
SODIUM SERPL-SCNC: 138 MMOL/L (ref 136–145)
WBC # BLD AUTO: 5.33 K/UL (ref 3.9–12.7)

## 2022-10-25 PROCEDURE — 99213 OFFICE O/P EST LOW 20 MIN: CPT | Mod: PBBFAC,59 | Performed by: INTERNAL MEDICINE

## 2022-10-25 PROCEDURE — 36415 COLL VENOUS BLD VENIPUNCTURE: CPT | Performed by: INTERNAL MEDICINE

## 2022-10-25 PROCEDURE — 99999 PR PBB SHADOW E&M-EST. PATIENT-LVL III: ICD-10-PCS | Mod: PBBFAC,,, | Performed by: INTERNAL MEDICINE

## 2022-10-25 PROCEDURE — 99999 PR PBB SHADOW E&M-EST. PATIENT-LVL III: CPT | Mod: PBBFAC,,, | Performed by: INTERNAL MEDICINE

## 2022-10-25 PROCEDURE — 90750 HZV VACC RECOMBINANT IM: CPT | Mod: PBBFAC

## 2022-10-25 PROCEDURE — 85025 COMPLETE CBC W/AUTO DIFF WBC: CPT | Performed by: INTERNAL MEDICINE

## 2022-10-25 PROCEDURE — 85610 PROTHROMBIN TIME: CPT | Performed by: INTERNAL MEDICINE

## 2022-10-25 PROCEDURE — 99215 OFFICE O/P EST HI 40 MIN: CPT | Mod: S$PBB,,, | Performed by: INTERNAL MEDICINE

## 2022-10-25 PROCEDURE — 80053 COMPREHEN METABOLIC PANEL: CPT | Performed by: INTERNAL MEDICINE

## 2022-10-25 PROCEDURE — 99215 PR OFFICE/OUTPT VISIT, EST, LEVL V, 40-54 MIN: ICD-10-PCS | Mod: S$PBB,,, | Performed by: INTERNAL MEDICINE

## 2022-10-25 NOTE — PROGRESS NOTES
Subjective:       Patient ID: Irvin Diaz Jr. is a 47 y.o. male.    Chief Complaint: No chief complaint on file.    HPI  I saw this 47 y.o. 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  He came to clinic with his mother.    He was previously followed by GI at [facility] but has recently been coming to [facility].    He has had some minor variceal bleeds in the past and his last admission was in the beginning of May 2022 at [facility]. Last banded in Aug 2020 but his varices have since been characterized as small.    He does not report any edema or ascites but his imaging did show abdominal fluid.  No episodes of HE.    EGD: 5/3/22  - Grade I esophageal varices.                          - LA Grade A reflux esophagitis with no bleeding.                          - Gastritis.                          - Non-bleeding gastric ulcer with no stigmata of                          bleeding.                          - Duodenitis.     Abdo US: 7/19/22  Morphological changes of cirrhosis.  No focal hepatic lesions.  Sequelae of portal venous hypertension, including patent umbilical vein, reversal of flow of the main and right portal veins, and mild splenomegaly.  Small right pleural effusion    Alcohol hx:  - 1/5 of gin per day  - completed outpatient rehab at [facility] and managed to stay off alcohol for 5 months but relapsed about 1 month ago.  - stopped drinking 2 weeks ago (Late April 2022).    No DUI  No legal troubles with alcohol    Completed [facility] on 10/21/22      MELD-Na score: 21 at 8/2/2022  3:30 PM  MELD score: 20 at 8/2/2022  3:30 PM  Calculated from:  Serum Creatinine: 1.7 mg/dL at 8/2/2022  3:30 PM  Serum Sodium: 135 mmol/L at 8/2/2022  3:30 PM  Total Bilirubin: 4.4 mg/dL at 8/2/2022  3:30 PM  INR(ratio): 1.3 at 8/2/2022  3:30 PM  Age: 46 years    PMH:  Colon resection- 2007- mass- not Ca  Diverticulitis  CKD  Hypertension  Hip OA  PE- 2018- stopped warfarin due to GI  bleed      SH:  Disabled  Ex-smoker (1 pack robert day for 3 years in his 20s)  Lives with mother  6 kids- healthy    FH:  Nil liver      Review of Systems   Constitutional:  Negative for activity change, appetite change, chills, fatigue, fever and unexpected weight change.   HENT:  Negative for hearing loss.    Eyes:  Negative for discharge and visual disturbance.   Respiratory:  Negative for cough, chest tightness, shortness of breath and wheezing.    Cardiovascular:  Negative for chest pain, palpitations and leg swelling.   Gastrointestinal:  Negative for abdominal distention, abdominal pain, constipation, diarrhea and nausea.   Genitourinary:  Negative for dysuria and frequency.   Musculoskeletal:  Negative for arthralgias and back pain.   Skin:  Negative for pallor and rash.   Neurological:  Negative for dizziness, tremors, speech difficulty and headaches.   Hematological:  Negative for adenopathy.   Psychiatric/Behavioral:  Negative for agitation and confusion.        Lab Results   Component Value Date    ALT 21 09/28/2022    AST 39 09/28/2022    GGT 81 (H) 07/21/2022    ALKPHOS 103 09/28/2022    BILITOT 3.8 (H) 09/28/2022     Past Medical History:   Diagnosis Date    Alcoholic cirrhosis of liver     Arthritis     ATN (acute tubular necrosis)     CHF (congestive heart failure)     Diverticulitis 01/2020    Esophageal varices     GERD (gastroesophageal reflux disease)     GI bleed     Hip arthritis     Left    Hypertension     Liver cirrhosis     Macrocytic anemia     Pulmonary embolism 08/2018    Unprovoked DVT.  Stop Coumadin due to GIB    Thrombocytopenia      Past Surgical History:   Procedure Laterality Date    ANKLE SURGERY      BLOCK, NERVE, PERIPHERAL Left 06/24/2022    Procedure: Left Hip femoral-obturator accessory nerve block;  Surgeon: Robbin De La Garza DO;  Location: [facility] OR;  Service: Pain Management;  Laterality: Left;    COLON SURGERY  2007    COLONOSCOPY  09/05/2019    [facility]    COLONOSCOPY N/A  02/17/2021    Procedure: COLONOSCOPY;  Surgeon: Renea Billingsley MD;  Location: [facility];  Service: Endoscopy;  Laterality: N/A;    COLONOSCOPY W/ BIOPSIES  02/17/2021    ESOPHAGOGASTRODUODENOSCOPY N/A 07/26/2019    Procedure: EGD (ESOPHAGOGASTRODUODENOSCOPY);  Surgeon: Brian Trivedi MD;  Location: [facility];  Service: Endoscopy;  Laterality: N/A;    ESOPHAGOGASTRODUODENOSCOPY N/A 04/08/2020    Procedure: EGD (ESOPHAGOGASTRODUODENOSCOPY);  Surgeon: Donn Acuna MD;  Location: [facility];  Service: Endoscopy;  Laterality: N/A;    ESOPHAGOGASTRODUODENOSCOPY N/A 01/03/2021    Procedure: EGD (ESOPHAGOGASTRODUODENOSCOPY)- coffee ground emesis, hx varices;  Surgeon: Steve Chairez MD;  Location: [facility];  Service: Endoscopy;  Laterality: N/A;    ESOPHAGOGASTRODUODENOSCOPY N/A 05/03/2021    Procedure: EGD (ESOPHAGOGASTRODUODENOSCOPY);  Surgeon: Jeanmarie Ngo MD;  Location: [facility];  Service: Endoscopy;  Laterality: N/A;    ESOPHAGOGASTRODUODENOSCOPY N/A 07/19/2021    Procedure: ESOPHAGOGASTRODUODENOSCOPY (EGD);  Surgeon: Claudio Medeiros MD;  Location: [facility];  Service: Endoscopy;  Laterality: N/A;    ESOPHAGOGASTRODUODENOSCOPY  12/20/2021    ESOPHAGOGASTRODUODENOSCOPY N/A 12/20/2021    Procedure: EGD (ESOPHAGOGASTRODUODENOSCOPY);  Surgeon: Ajay Jackson MD;  Location: [facility];  Service: Endoscopy;  Laterality: N/A;    ESOPHAGOGASTRODUODENOSCOPY N/A 05/03/2022    Procedure: EGD (ESOPHAGOGASTRODUODENOSCOPY);  Surgeon: Brian Trivedi MD;  Location: [facility];  Service: Endoscopy;  Laterality: N/A;    ESOPHAGOGASTRODUODENOSCOPY N/A 7/7/2022    Procedure: EGD (ESOPHAGOGASTRODUODENOSCOPY);  Surgeon: Ajay Jackson MD;  Location: [facility];  Service: Endoscopy;  Laterality: N/A;    HERNIA REPAIR Left     Inguinal    UPPER GASTROINTESTINAL ENDOSCOPY N/A 07/07/2022     Current Outpatient Medications   Medication Sig    acamprosate (CAMPRAL) 333 mg tablet Take 2 tablets (666 mg total) by  mouth 3 (three) times daily.    carvediloL (COREG) 12.5 MG tablet Take 1 tablet (12.5 mg total) by mouth 2 (two) times daily with meals.    clindamycin-benzoyl peroxide (BENZACLIN) gel Apply topically 2 (two) times daily.    ergocalciferol (ERGOCALCIFEROL) 50,000 unit Cap 1 tablet    folic acid (FOLVITE) 1 MG tablet Take 1 tablet (1 mg total) by mouth once daily.    nortriptyline (PAMELOR) 25 MG capsule Take 1 capsule (25 mg total) by mouth every evening.    pantoprazole (PROTONIX) 40 MG tablet Take 1 tablet (40 mg total) by mouth once daily. (Patient taking differently: Take 40 mg by mouth 2 (two) times daily.)    rifAXIMin (XIFAXAN) 550 mg Tab Take 1 tablet (550 mg total) by mouth 2 (two) times daily.    sildenafiL (VIAGRA) 100 MG tablet Take 1 tablet (100 mg total) by mouth daily as needed for Erectile Dysfunction.    sucralfate (CARAFATE) 1 gram tablet Take 1 tablet (1 g total) by mouth 4 (four) times daily.    thiamine 100 MG tablet Take 1 tablet (100 mg total) by mouth once daily.    famotidine (PEPCID) 20 MG tablet 1 tablet at bedtime as needed    ferrous gluconate (FERGON) 240 (27 FE) MG tablet Take 2 tablets (480 mg total) by mouth 2 (two) times daily with meals.    ondansetron (ZOFRAN-ODT) 4 MG TbDL 1 tablet on the tongue and allow to dissolve    oxyCODONE (ROXICODONE) 5 MG immediate release tablet Take 1 tablet (5 mg total) by mouth 2 (two) times daily as needed for Pain.    potassium chloride (K-TAB) 20 mEq 1 tablet with food    triamcinolone acetonide 0.1% (KENALOG) 0.1 % ointment APPLY TOPICALLY TO THE AFFECTED AREA TWICE DAILY. DO NOT APPLY TO FACE OR ANOGENITAL REGION.     No current facility-administered medications for this visit.       Objective:      Physical Exam  HENT:      Head: Normocephalic.   Eyes:      Pupils: Pupils are equal, round, and reactive to light.   Neck:      Thyroid: No thyromegaly.   Cardiovascular:      Rate and Rhythm: Normal rate and regular rhythm.      Heart sounds:  Normal heart sounds.   Pulmonary:      Effort: Pulmonary effort is normal.      Breath sounds: Normal breath sounds. No wheezing.   Abdominal:      General: There is no distension.      Palpations: Abdomen is soft. There is no mass.      Tenderness: There is no abdominal tenderness.   Lymphadenopathy:      Cervical: No cervical adenopathy.   Skin:     General: Skin is warm.      Findings: No erythema or rash.   Neurological:      Mental Status: He is alert and oriented to person, place, and time.   Psychiatric:         Behavior: Behavior normal.         Assessment:       1. Alcoholic cirrhosis of liver without ascites          Plan:   This 47 year old man has decompensated alcohol related cirrhosis with jaundice and mild ascites. His last available MELD score in Aug 2022 was 21 driven by his bilirubin and his chronically elevated creatinine.    Once again, I explained to him that his liver function is severely imparied but can recover with prolonged abstinence. Explained that he should aim for lifelong complete abstinence.    - Stopped drinking in mid Aug 2022  - just completed [facility] and is starting aftercare program on Nov 1 2022.    - Left hip fracture 3 years ago with poor healing and dificulty walking  - Walker around the house  - wheelchair for distances    Labs done today  We will repeat them in 1 month and two months and I will see him in Jan 2023.  He will hopefully contiue to improve and may not need a liver Tx of he remains abstinent.

## 2022-10-28 RX ORDER — SILDENAFIL 100 MG/1
100 TABLET, FILM COATED ORAL DAILY PRN
Qty: 30 TABLET | Refills: 5 | Status: SHIPPED | OUTPATIENT
Start: 2022-10-28 | End: 2023-12-08 | Stop reason: SDUPTHER

## 2022-10-28 RX ORDER — SILDENAFIL 100 MG/1
100 TABLET, FILM COATED ORAL DAILY PRN
Qty: 30 TABLET | Refills: 5 | Status: SHIPPED | OUTPATIENT
Start: 2022-10-28 | End: 2022-10-28

## 2022-10-28 NOTE — TELEPHONE ENCOUNTER
No new care gaps identified.  [employer] Embedded Care Gaps. Reference number: [id number]. 10/28/2022   2:30:16 AM CDT

## 2022-10-31 ENCOUNTER — OFFICE VISIT (OUTPATIENT)
Dept: PSYCHIATRY | Facility: CLINIC | Age: 47
End: 2022-10-31
Payer: MEDICARE

## 2022-10-31 ENCOUNTER — OFFICE VISIT (OUTPATIENT)
Dept: PAIN MEDICINE | Facility: CLINIC | Age: 47
End: 2022-10-31
Payer: MEDICARE

## 2022-10-31 VITALS
BODY MASS INDEX: 26.41 KG/M2 | HEART RATE: 80 BPM | WEIGHT: 195 LBS | SYSTOLIC BLOOD PRESSURE: 137 MMHG | HEIGHT: 72 IN | RESPIRATION RATE: 18 BRPM | DIASTOLIC BLOOD PRESSURE: 81 MMHG

## 2022-10-31 VITALS
WEIGHT: 202 LBS | DIASTOLIC BLOOD PRESSURE: 78 MMHG | HEART RATE: 73 BPM | SYSTOLIC BLOOD PRESSURE: 139 MMHG | BODY MASS INDEX: 27.4 KG/M2

## 2022-10-31 DIAGNOSIS — Z79.899 OTHER LONG TERM (CURRENT) DRUG THERAPY: ICD-10-CM

## 2022-10-31 DIAGNOSIS — F10.21 ALCOHOL USE DISORDER, SEVERE, IN EARLY REMISSION: Primary | ICD-10-CM

## 2022-10-31 DIAGNOSIS — G89.4 CHRONIC PAIN SYNDROME: ICD-10-CM

## 2022-10-31 DIAGNOSIS — F11.90 CHRONIC, CONTINUOUS USE OF OPIOIDS: ICD-10-CM

## 2022-10-31 DIAGNOSIS — M25.552 CHRONIC LEFT HIP PAIN: ICD-10-CM

## 2022-10-31 DIAGNOSIS — G89.29 CHRONIC LEFT HIP PAIN: ICD-10-CM

## 2022-10-31 DIAGNOSIS — D69.6 THROMBOCYTOPENIA: ICD-10-CM

## 2022-10-31 DIAGNOSIS — M87.052 AVASCULAR NECROSIS OF HIP, LEFT: Primary | ICD-10-CM

## 2022-10-31 PROCEDURE — 99211 OFF/OP EST MAY X REQ PHY/QHP: CPT | Mod: PBBFAC,27 | Performed by: NURSE PRACTITIONER

## 2022-10-31 PROCEDURE — 99214 PR OFFICE/OUTPT VISIT, EST, LEVL IV, 30-39 MIN: ICD-10-PCS | Mod: S$PBB,,, | Performed by: STUDENT IN AN ORGANIZED HEALTH CARE EDUCATION/TRAINING PROGRAM

## 2022-10-31 PROCEDURE — 99999 PR PBB SHADOW E&M-EST. PATIENT-LVL I: ICD-10-PCS | Mod: PBBFAC,,, | Performed by: NURSE PRACTITIONER

## 2022-10-31 PROCEDURE — 99999 PR PBB SHADOW E&M-EST. PATIENT-LVL IV: CPT | Mod: PBBFAC,,, | Performed by: STUDENT IN AN ORGANIZED HEALTH CARE EDUCATION/TRAINING PROGRAM

## 2022-10-31 PROCEDURE — 99999 PR PBB SHADOW E&M-EST. PATIENT-LVL IV: ICD-10-PCS | Mod: PBBFAC,,, | Performed by: STUDENT IN AN ORGANIZED HEALTH CARE EDUCATION/TRAINING PROGRAM

## 2022-10-31 PROCEDURE — 99214 OFFICE O/P EST MOD 30 MIN: CPT | Mod: S$PBB,,, | Performed by: NURSE PRACTITIONER

## 2022-10-31 PROCEDURE — 99214 OFFICE O/P EST MOD 30 MIN: CPT | Mod: PBBFAC,25 | Performed by: STUDENT IN AN ORGANIZED HEALTH CARE EDUCATION/TRAINING PROGRAM

## 2022-10-31 PROCEDURE — 99214 PR OFFICE/OUTPT VISIT, EST, LEVL IV, 30-39 MIN: ICD-10-PCS | Mod: S$PBB,,, | Performed by: NURSE PRACTITIONER

## 2022-10-31 PROCEDURE — 99214 OFFICE O/P EST MOD 30 MIN: CPT | Mod: S$PBB,,, | Performed by: STUDENT IN AN ORGANIZED HEALTH CARE EDUCATION/TRAINING PROGRAM

## 2022-10-31 PROCEDURE — 99999 PR PBB SHADOW E&M-EST. PATIENT-LVL I: CPT | Mod: PBBFAC,,, | Performed by: NURSE PRACTITIONER

## 2022-10-31 RX ORDER — OXYCODONE HYDROCHLORIDE 5 MG/1
5 TABLET ORAL 2 TIMES DAILY PRN
Qty: 60 TABLET | Refills: 0 | Status: ON HOLD | OUTPATIENT
Start: 2023-01-12 | End: 2022-11-30 | Stop reason: HOSPADM

## 2022-10-31 RX ORDER — OXYCODONE HYDROCHLORIDE 5 MG/1
5 TABLET ORAL 2 TIMES DAILY PRN
Qty: 60 TABLET | Refills: 0 | Status: SHIPPED | OUTPATIENT
Start: 2022-11-13 | End: 2022-12-14 | Stop reason: SDUPTHER

## 2022-10-31 RX ORDER — OXYCODONE HYDROCHLORIDE 5 MG/1
5 TABLET ORAL 2 TIMES DAILY PRN
Qty: 60 TABLET | Refills: 0 | Status: ON HOLD | OUTPATIENT
Start: 2022-12-13 | End: 2022-11-30 | Stop reason: HOSPADM

## 2022-10-31 NOTE — PROGRESS NOTES
"Outpatient Psychiatry Follow-Up Visit (MD/NP)    10/31/2022    Clinical Status of Patient:  Outpatient (Ambulatory)    Chief Complaint:  Irvin Diaz Jr. is a 47 y.o. male who presents today for follow-up of  alcohol use disorder .  Met with patient.      Interval History and Content of Current Session:    From [facility] discharge summary:    "HISTORY OF PRESENT ILLNESS ON ADMIT  Pt states that he is coming to IOP 2/2 al;cohol issues. Pt has had sever alcohol use for sometime now and has jhad issues with cirrohsis for a while. Pt noticed it was a major issue when had esophageal varices and had to be hospitalized. Pt states he has been talking with hepatology and his PCP and realizes now the need for sobriety. Pt was a pint per day drinker until hospitalization in July. Pt was also smoking every now and then back then. Since July pt has had complete abstinence except for 1 pint on Saturday. Pt positive for canabis on UDS and states this is from people around him smoking from he has been told. Pt denies all other use. Pt denies any psych hx. Pt is on TCA for pain and it is manged by painmgmt. PT ROS positive for sleep issues. Sleeps 3-4 hrs per night and takes frequent naps. Pt states his timing has shifted to where he sleeps more during the day. Rest of ROS negative. Pt denies any SI HI AVH or other psychtoic/manic phenomena. Resrt of hx as outlined below.     COURSE OF TREATMENT THROUGHOUT PROGRAM  Patient admitted to [facility] for severe alcohol use with hepatic cirrhosis. Patient able to attend program appropriately and slowly embraced goals of program. Over time, patient became more vocal in group meetings and able to demonstrate appropriate participation and affect towards his alcohol use recovery. Patient progressing through goals of program and attending AA and other meetings weekly through virtual platforms. Patient initially resistant to having a sponsor, however " "he slowly became more open to the idea as he learned what a sponsor serves as and how it can be an additional leg of support for addiction recovery. Patient biomarkers for drug and alcohol use consistently negative, indicating appropriate abstinence from all substances during the program."      Patient reports living with his parents. Has 6 children, 1 grandchild who all live in the area except for 1 daughter who lives in [place]. Patient states that things have been going well since he completed IOP on 10/21/22. He has not drank alcohol, denies any recent urges/cravings to drink. Has been attending AA meetings online. Patient states he was supposed to get an in-person sponsor, however this fell through. He does plan on attending in-person AA meeting this Wednesday. Patient states that his mood is good. Others have commented that he is "not as grouchy" since he has stopped drinking. Sleep is adequate, averaging ~ 6 hours nightly. Appetite is strong, eating 2-3 meals daily. Energy level is fair. He denies concerns regarding anxiety. Denies lethality concerns.     HPI/Psychiatric Review Of Systems (is patient experiencing or having changes in):    Mood: euthymic mood and affect. Pleasant, engages easily in conversation  Anhedonia/interest: denies, enjoys watching sports  Guilt/hopelessness: denies  Concentration: no concerns expressed  Sleep: regulated, averages 6-7 hours nightly which he reports is good for him  Energy: fair  Appetite: strong  SI/SIB/VI/HI: denies  Anxiety/panic: no concerns  Paranoia: denies  AVH: denies  Substance use: non-smoker. Sober from ETOH. Denies other substance use     Medications:    Acamprosate 666 mg PO TID -- 90-95% compliance, denies SE    Nortriptyline 25 mg nightly (prescribed for pain)    Psychiatric hx --     No formal psychiatric hx. Previous CD treatment (brief engagement with IOP in 2020, then completed [facility] recovery IOP in 10/2022). No psychiatric hospitalizations. No " suicide attempts or violence.    Medical hx --     Cirrhosis, avascular necrosis of L hip, thrombocytopenia, hypertension.     Brief synopsis:  Sober from ETOH, euthymic      Review of Systems   PSYCHIATRIC: Pertinant items are noted in the narrative.  CONSTITUTIONAL: No weight gain or loss.   MUSCULOSKELETAL: Positive for pain and hip pain.  NEUROLOGIC: No weakness, sensory changes, seizures, confusion, memory loss, tremor or other abnormal movements.  GASTROINTESTINAL: No nausea, vomiting, pain, constipation or diarrhea.    Past Medical, Family and Social History: The patient's past medical, family and social history have been reviewed and updated as appropriate within the electronic medical record - see encounter notes.    Compliance: see above    Side effects: see above    Risk Parameters:  Patient reports no suicidal ideation  Patient reports no homicidal ideation  Patient reports no self-injurious behavior  Patient reports no violent behavior    Exam (detailed: at least 9 elements; comprehensive: all 15 elements)   Constitutional  Vitals:  Most recent vital signs, dated less than 90 days prior to this appointment, were reviewed.   Vitals:    10/31/22 1501   BP: 139/78   Pulse: 73   Weight: 91.6 kg (202 lb)        General:  unremarkable, age appropriate     Musculoskeletal  Muscle Strength/Tone:  no spasicity, no rigidity, no cogwheeling, no flaccidity   Gait & Station:  in wheelchair     Psychiatric  Speech:  no latency; no press   Mood & Affect:  euthymic  congruent and appropriate, full   Thought Process:  normal and logical   Associations:  intact   Thought Content:  normal, no suicidality, no homicidality, delusions, or paranoia   Insight:  intact   Judgement: behavior is adequate to circumstances   Orientation:  grossly intact   Memory: intact for content of interview   Language: grossly intact   Attention Span & Concentration:  able to focus   Fund of Knowledge:  intact and appropriate to age and level  of education     Assessment and Diagnosis   Status/Progress: Based on the examination today, the patient's problem(s) is/are well controlled.  New problems have not been presented today.   Co-morbidities, Diagnostic uncertainty, and Lack of compliance are not complicating management of the primary condition.  There are no active rule-out diagnoses for this patient at this time.     General Impression: Irvin Diaz Jr. is a 47 y.o. male with a psychiatric hx of severe alcohol use disorder, currently in early remission. Medical hx is significant for cirrhosis, avascular necrosis of L hip, thrombocytopenia, hypertension. No formal psychiatric hx. Completed recovery IOP through [facility] in October 2022. No hx of psychiatric hospitalizations. No hx of suicide attempts or violence. Lives with his parents. On medical disability       ICD-10-CM ICD-9-CM   1. Alcohol use disorder, severe, in early remission  F10.21 303.93       Intervention/Counseling/Treatment Plan   Reviewed patient's symptoms and medication regimen  Strongly encouraged engagement in AA  Reviewed importance of following up with Dr. Ciarra Kulkarni   Continue acamprosate as prescribed      Return to Clinic: 3 months    Face-to-face time spent: 24 minutes  41 minutes total time spent. This includes face to face time and non-face to face time preparing to see the patient (eg, review of tests), obtaining and/or reviewing separately obtained history, documenting clinical information in the electronic or other health record, independently interpreting results and communicating results to the patient/family/caregiver, or care coordinator.

## 2022-10-31 NOTE — PROGRESS NOTES
Chronic Pain - f/u    Referring Physician: No ref. provider found    Date: 10/31/2022     Re: Irvin Diaz Jr.  MR#: [id number]  YOB: 1975  Age: 47 y.o.    Chief Complaint: hip pain  No chief complaint on file.    **This note is dictated using the M*Modal Fluency Direct word recognition program. There are word recognition mistakes that are occasionally missed on review.**    ASSESSMENT: 47 y.o. year old male with left hip pain, consistent with     1. Avascular necrosis of hip, left  oxyCODONE (ROXICODONE) 5 MG immediate release tablet    oxyCODONE (ROXICODONE) 5 MG immediate release tablet    oxyCODONE (ROXICODONE) 5 MG immediate release tablet      2. Chronic left hip pain  oxyCODONE (ROXICODONE) 5 MG immediate release tablet    oxyCODONE (ROXICODONE) 5 MG immediate release tablet    oxyCODONE (ROXICODONE) 5 MG immediate release tablet      3. Chronic pain syndrome  oxyCODONE (ROXICODONE) 5 MG immediate release tablet    oxyCODONE (ROXICODONE) 5 MG immediate release tablet    oxyCODONE (ROXICODONE) 5 MG immediate release tablet      4. Chronic, continuous use of opioids        5. Thrombocytopenia        6. Other long term (current) drug therapy             PLAN:     Avascular necrosis of the left hip   -s/p hip block without significant pain relief and complicated by Hematoma.   -Tramadol ineffective (discontinued)  -tolerating oxycodone 5 mg BID PRN (he thinks his lucid dreams were due to EtOH withdrawal). No more lucid dreams. Refill x3 months provided.  - Medrol dose pack, helped while taking, but not sustained.  -not surgical candidate  -Not a candidate for further procedures due to bleeding risk  - Plts improved to ~80, discussed possibility of   -buprenorphine likely not an option 2/2 to his ESLD and liver transplant status    Allodynia  -continue Nortriptyline 25 mg nightly. helpful    Thorombocytopenia 2/2 cirrhosis  -platelets are92 on 5/26/22, 82 on 9/7/22, 74 on 10/25/22  -Avanos  recommended above 50, but he had hematoma, so will not proceed.    Chronic Opioid Use  - UDS from October and September did not show opioids but he states he was decreasing use at that time.  -should get new UDS at next visit to verify taking medications appropriately.    Cirrhosis 2/2 past EtOH. He states he is not drinking anymore.  -following with hepatology  -possible liver transplant?  -working with psychiatry for EtOH.    Kidney disease  -GFR 54    - RTC 3 months  - Counseled patient regarding the importance of  activity modification.    The above plan and management options were discussed at length with patient. Patient is in agreement with the above and verbalized understanding. It will be communicated with the referring physician via electronic record, fax, or mail.  Lab/study reports reviewed were important and necessary because subsequent medical and treatment recommendations required review of the above lab/study reports. Images viewed/reviewed above were important and necessary because subsequent medical and treatment recommendations required review of the reviewed image(s).     Electronically signed by:  Robbin De La Garza DO  10/31/2022    =========================================================================================================    SUBJECTIVE:    Interval History 10/31/2022:     Irvin WOOD Joe Gudino is a 47 y.o. male presents to the clinic for follow up.  Since last visit the pain has has significantly worsened.    The pain is located in the L hip area and radiates to the L foot .  The pain is described as aching, dull, shooting, stabbing, tight band, and tingling    At BEST  8/10   At WORST  10/10 on the WORST day.    On average pain is rated as 7/10.   Today the pain is rated as 8/10  Symptoms interfere with daily activity, sleeping, and work.   Exacerbating factors: Standing, Bending, Coughing/Sneezing, Walking, and Getting out of bed/chair.    Mitigating factors nothing and  medications.     Current pain medications: Oxycodone 5mg BID    Failed Pain Medications: oxycodone, tramadol, gabapentin, cannot take NSAIDs due to gastric bleeding, cannot take tylenol due to liver failure.     Pain procedures:  6/24/22 - left hip block - no relief. C/b hematoma formation    Interval History 9/7/2022:     Irvin Diaz Jr. is a 47 y.o. male presents to the clinic for follow up.  Since last visit the pain has has worsened. He is now using a walker full time for the last 2 weeks. In a wheelchair today. He is unable to describe if his limited mobility is due to just pain or maybe a component of weakness.     The pain is located in the L hip area and radiates to the L foot .  The pain is described as aching, shooting, and stabbing    At BEST  7/10   At WORST  10/10 on the WORST day.    On average pain is rated as 8/10.   Today the pain is rated as 10/10  Symptoms interfere with daily activity, sleeping, and work.   Exacerbating factors: Standing, Laying, Bending, Walking, Lifting, and Getting out of bed/chair.    Mitigating factors none.     Interval History 8/2/2022:     Irvin Diaz Jr. is a 47 y.o. male presents to the clinic for follow up.  Since last visit the pain has is unchanged.  He stopped the Oxycodone because it gave severe, lucid dreams.  Ran out of tramadol and has not been taking anything for his pain.    The pain is located in the left hip area and radiates to the left knee/ groin .  The pain is described as aching, shooting and stabbing    At BEST  8/10   At WORST  10/10 on the WORST day.    On average pain is rated as 8/10.   Today the pain is rated as 8/10  Symptoms interfere with daily activity, sleeping and work.   Exacerbating factors: Standing, Walking and Getting out of bed/chair.    Mitigating factors nothing, heat, ice, medications and rest.     Interval History 7/5/2022:     Irvin Diaz Jr. is a 47 y.o. male presents to the clinic for follow up.  Since last  visit the pain has is unchanged.  He is s/p hip block that was complicated by hematoma in the pectineus muscle.  His Hgb has dropped, although the hematoma is resolving.  Recommended that patient go to ED as recommended by transplant.    The pain is located in the left hip area and radiates to the left leg/ groin  .  The pain is described as aching, shooting and stabbing    At BEST  8/10   At WORST  10/10 on the WORST day.    On average pain is rated as 8/10.   Today the pain is rated as 8/10  Symptoms interfere with daily activity, sleeping and work.   Exacerbating factors: Standing, Walking and Getting out of bed/chair.    Mitigating factors nothing, heat, ice, medications and rest.     Interval History 5/30/2022:     Irvin Diaz Jr. is a 47 y.o. male presents to the clinic for follow up.  Since last visit the pain has is unchanged. He missed his original procedure date due to not checking his voicemail and transportation issues. He is still interested in the procedure.  WE had a long talk about bleeding risk with his low platelets and high INR.    The pain is located in the left hip area and radiates to the left leg/groin .  The pain is described as aching, shooting and stabbing    At BEST  8/10   At WORST  10/10 on the WORST day.    On average pain is rated as 8/10.   Today the pain is rated as 8/10  Symptoms interfere with daily activity, sleeping and work.   Exacerbating factors: walking.    Mitigating factors nothing.     Initial Hx:  Irvin Diaz Jr. is a 47 y.o. male presents to the clinic for the evaluation of left hip pain. The pain started 3 years ago following fall and symptoms have been worsening. The patient states used to get hip injections of the left hip.  He is unable to walk without a cane.  He states that he was told after imaging that he had a fracture in 2019.  He had an injection (and aspiration) in December/january and it helps the pain for about 3 weeks.  After the injections  he still needs the pain but it helps.  The ortho physician at [facility]    CT note about the hip:  There is collapse of the left femoral head superior portion with bone-on-bone as well as underlying femoral sclerotic changes suggesting AVN with severe secondary degenerative changes of the acetabulum and the left hip effusion stable since prior exam.     The patient says that he has seen a ortho surgeon in the past and that they are unable to do surgery due to his liver failure.    Pain Description:    The pain is located in the left hip area and radiates to the left leg/ groin area .    At BEST  8/10   At WORST  10/10 on the WORST day.    On average pain is rated as 8/10.   Today the pain is rated as 8/10  The pain is continuous.  The pain is described as aching, shooting and throbbing    Symptoms interfere with daily activity, sleeping and work.   Exacerbating factors: Standing, Laying, Bending, Walking, Night Time, Morning, Flexing, Lifting and Getting out of bed/chair.    Mitigating factors laying down and medications.   He reports 5 hours of sleep per night.    Physical Therapy/Home Exercise: No, not currently in physical therapy or home exercise program    Current Pain Medications:    - none    Failed Pain Medications:    - cannot take NSAIDs due to gastric bleeding, cannot take tylenol due to liver failure.     Pain Treatment Therapies:    Pain procedures: hip injections  Physical Therapy: physical therapy made things worse  Chiropractor: none  Acupuncture: none  TENS unit: none  Spinal decompression: none  Joint replacement: none    Patient denies urinary incontinence, bowel incontinence and loss of sensations. (+) weakness in the left leg  Patient denies any suicidal or homicidal ideations     report:  Reviewed and consistent with medication use as prescribed.    Imaging:   XR abdomen 03/2022:  Please note the hemidiaphragms are not included in their entirety.  Multiple surgical clips and  presumed anastomotic suture line project over the right abdomen.  There are a few mildly prominent loops of gas-filled small bowel loops present measuring up to 3.3 cm in the left abdomen.  Limited evaluation of free intraperitoneal air to patient positioning/technique.  Calcifications project over the pelvis, likely phleboliths.  Osseous structures demonstrate significant degenerative change of the left hip with chronic appearing deformity/collapse of the left femoral head.    CT abdomen 03/2022:  Mild circumferential wall thickening involving the proximal colon extending from the ileocolic anastomosis at the hepatic flexure through around the level of the splenic flexure suggests inflammatory or infectious colitis.  No convincing transmural inflammation is seen.     Postoperative changes of proximal colectomy and scattered mild diverticulosis of the more distal colon.  No convincing diverticulitis, bowel obstruction, free air or abscess.     Findings of cirrhosis and mild abdominal ascites as described essentially stable.     Left hip findings suggesting AVN as described.     This report was flagged in Epic as abnormal.     No other significant or acute findings.       Past Medical History:   Diagnosis Date    Alcoholic cirrhosis of liver     Arthritis     ATN (acute tubular necrosis)     CHF (congestive heart failure)     Diverticulitis 01/2020    Esophageal varices     GERD (gastroesophageal reflux disease)     GI bleed     Hip arthritis     Left    Hypertension     Liver cirrhosis     Macrocytic anemia     Pulmonary embolism 08/2018    Unprovoked DVT.  Stop Coumadin due to GIB    Thrombocytopenia      Past Surgical History:   Procedure Laterality Date    ANKLE SURGERY      BLOCK, NERVE, PERIPHERAL Left 06/24/2022    Procedure: Left Hip femoral-obturator accessory nerve block;  Surgeon: Robbin De La Garza DO;  Location: [facility] OR;  Service: Pain Management;  Laterality: Left;    COLON SURGERY  2007     COLONOSCOPY  09/05/2019    [facility]    COLONOSCOPY N/A 02/17/2021    Procedure: COLONOSCOPY;  Surgeon: Renea Billingsley MD;  Location: [facility];  Service: Endoscopy;  Laterality: N/A;    COLONOSCOPY W/ BIOPSIES  02/17/2021    ESOPHAGOGASTRODUODENOSCOPY N/A 07/26/2019    Procedure: EGD (ESOPHAGOGASTRODUODENOSCOPY);  Surgeon: Brian Trivedi MD;  Location: [facility];  Service: Endoscopy;  Laterality: N/A;    ESOPHAGOGASTRODUODENOSCOPY N/A 04/08/2020    Procedure: EGD (ESOPHAGOGASTRODUODENOSCOPY);  Surgeon: Donn Acuna MD;  Location: [facility];  Service: Endoscopy;  Laterality: N/A;    ESOPHAGOGASTRODUODENOSCOPY N/A 01/03/2021    Procedure: EGD (ESOPHAGOGASTRODUODENOSCOPY)- coffee ground emesis, hx varices;  Surgeon: Steve Chairez MD;  Location: [facility];  Service: Endoscopy;  Laterality: N/A;    ESOPHAGOGASTRODUODENOSCOPY N/A 05/03/2021    Procedure: EGD (ESOPHAGOGASTRODUODENOSCOPY);  Surgeon: Jeanmarie Ngo MD;  Location: [facility];  Service: Endoscopy;  Laterality: N/A;    ESOPHAGOGASTRODUODENOSCOPY N/A 07/19/2021    Procedure: ESOPHAGOGASTRODUODENOSCOPY (EGD);  Surgeon: Claudio Medeiros MD;  Location: [facility];  Service: Endoscopy;  Laterality: N/A;    ESOPHAGOGASTRODUODENOSCOPY  12/20/2021    ESOPHAGOGASTRODUODENOSCOPY N/A 12/20/2021    Procedure: EGD (ESOPHAGOGASTRODUODENOSCOPY);  Surgeon: Ajay Jackson MD;  Location: [facility];  Service: Endoscopy;  Laterality: N/A;    ESOPHAGOGASTRODUODENOSCOPY N/A 05/03/2022    Procedure: EGD (ESOPHAGOGASTRODUODENOSCOPY);  Surgeon: Brian Trivedi MD;  Location: [facility];  Service: Endoscopy;  Laterality: N/A;    ESOPHAGOGASTRODUODENOSCOPY N/A 7/7/2022    Procedure: EGD (ESOPHAGOGASTRODUODENOSCOPY);  Surgeon: Ajay Jacskon MD;  Location: [facility];  Service: Endoscopy;  Laterality: N/A;    HERNIA REPAIR Left     Inguinal    UPPER GASTROINTESTINAL ENDOSCOPY N/A 07/07/2022     Social History     Socioeconomic History    Marital status: Legally     Tobacco Use    Smoking status: Former     Types: Cigarettes     Quit date: 2000     Years since quittin.8    Smokeless tobacco: Never    Tobacco comments:     quit 20 years ago   Substance and Sexual Activity    Alcohol use: Not Currently     Comment: freq    Drug use: Yes     Types: Marijuana     Comment: occasionally    Sexual activity: Yes     Comment: occ     Social Determinants of Health     Financial Resource Strain: Low Risk     Difficulty of Paying Living Expenses: Not very hard   Food Insecurity: No Food Insecurity    Worried About Running Out of Food in the Last Year: Never true    Ran Out of Food in the Last Year: Never true   Transportation Needs: No Transportation Needs    Lack of Transportation (Medical): No    Lack of Transportation (Non-Medical): No   Physical Activity: Inactive    Days of Exercise per Week: 0 days    Minutes of Exercise per Session: 0 min   Stress: No Stress Concern Present    Feeling of Stress : Not at all   Social Connections: Socially Isolated    Frequency of Communication with Friends and Family: Three times a week    Frequency of Social Gatherings with Friends and Family: Three times a week    Attends Religion Services: Never    Active Member of Clubs or Organizations: No    Attends Club or Organization Meetings: Never    Marital Status:    Housing Stability: Low Risk     Unable to Pay for Housing in the Last Year: No    Number of Places Lived in the Last Year: 1    Unstable Housing in the Last Year: No     Family History   Problem Relation Age of Onset    Hypertension Mother     Breast cancer Mother     Hypertension Father     Prostate cancer Father     Bladder Cancer Father        Review of patient's allergies indicates:   Allergen Reactions    Ciprofloxacin hcl Hallucinations    Meperidine Hives     Pt medicated w/multiple medications (demerol, protonix, and zofran)  w/i 10 mins time frame prior to developing localized hives near IV site that med  was administered. Pt reports he has/takes all other medications on daily basis.     Morphine Itching    Nsaids (non-steroidal anti-inflammatory drug)      Kidney Disease    Oxycodone      Weird, lucid dreams    Tylenol [acetaminophen]      Hx of liver disease       Current Outpatient Medications   Medication Sig    acamprosate (CAMPRAL) 333 mg tablet Take 2 tablets (666 mg total) by mouth 3 (three) times daily.    carvediloL (COREG) 12.5 MG tablet Take 1 tablet (12.5 mg total) by mouth 2 (two) times daily with meals.    clindamycin-benzoyl peroxide (BENZACLIN) gel Apply topically 2 (two) times daily.    ergocalciferol (ERGOCALCIFEROL) 50,000 unit Cap 1 tablet    famotidine (PEPCID) 20 MG tablet 1 tablet at bedtime as needed    ferrous gluconate (FERGON) 240 (27 FE) MG tablet Take 2 tablets (480 mg total) by mouth 2 (two) times daily with meals.    folic acid (FOLVITE) 1 MG tablet Take 1 tablet (1 mg total) by mouth once daily.    nortriptyline (PAMELOR) 25 MG capsule Take 1 capsule (25 mg total) by mouth every evening.    ondansetron (ZOFRAN-ODT) 4 MG TbDL 1 tablet on the tongue and allow to dissolve    pantoprazole (PROTONIX) 40 MG tablet Take 1 tablet (40 mg total) by mouth once daily. (Patient taking differently: Take 40 mg by mouth 2 (two) times daily.)    potassium chloride (K-TAB) 20 mEq 1 tablet with food    rifAXIMin (XIFAXAN) 550 mg Tab Take 1 tablet (550 mg total) by mouth 2 (two) times daily.    sildenafiL (VIAGRA) 100 MG tablet Take 1 tablet (100 mg total) by mouth daily as needed for Erectile Dysfunction.    thiamine 100 MG tablet Take 1 tablet (100 mg total) by mouth once daily.    triamcinolone acetonide 0.1% (KENALOG) 0.1 % ointment Apply topically 2 (two) times daily. Do not apply to face or anogenital regions    [START ON 11/13/2022] oxyCODONE (ROXICODONE) 5 MG immediate release tablet Take 1 tablet (5 mg total) by mouth 2 (two) times daily as needed for Pain.    [START ON 12/13/2022] oxyCODONE  (ROXICODONE) 5 MG immediate release tablet Take 1 tablet (5 mg total) by mouth 2 (two) times daily as needed for Pain.    [START ON 1/12/2023] oxyCODONE (ROXICODONE) 5 MG immediate release tablet Take 1 tablet (5 mg total) by mouth 2 (two) times daily as needed for Pain.     No current facility-administered medications for this visit.       REVIEW OF SYSTEMS:    GENERAL:  No weight loss, malaise or fevers.   HEENT:   No recent changes in vision or hearing   NECK:  Negative for lumps, no difficulty with swallowing.  RESPIRATORY:  Negative for cough, wheezing or shortness of breath, patient denies any recent URI.  CARDIOVASCULAR:  Negative for chest pain, leg swelling or palpitations.  GI:  Negative for abdominal discomfort, blood in stools or black stools or change in bowel habits.  MUSCULOSKELETAL:  See HPI.  SKIN:  Negative for lesions, rash, and itching. + skin itching  PSYCH:  No mood disorder or recent psychosocial stressors.  Patients sleep is not disturbed secondary to pain.  HEMATOLOGY/LYMPHOLOGY:  Negative for prolonged bleeding, bruising easily or swollen nodes.  Patient is not currently taking any anti-coagulants  NEURO:   No history of headaches, syncope, paralysis, seizures or tremors.  All other reviewed and negative other than HPI.    OBJECTIVE:    /81   Pulse 80   Resp 18   Ht 6' (1.829 m)   Wt 88.5 kg (195 lb)   BMI 26.45 kg/m²     PHYSICAL EXAMINATION:    GENERAL: Fraile, in no acute distress, alert and oriented x3.  PSYCH:  Mood and affect appropriate.  SKIN: Skin color, texture, turgor normal, no rashes or lesions on visible skin.  HEAD/FACE:  Normocephalic, atraumatic. Cranial nerves grossly intact.  CV: RRR with palpation of the radial artery.  PULM: CTAB. No evidence of respiratory difficulty, symmetric chest rise.  GI:  Soft    MUSKULOSKELETAL:    EXTREMITIES:   Left Hip Exam (limited ROM and exam 2/2/ pain)  - Log Roll Positive  - FADIR Positive  - [facility]  -  Hip Scour Unable to Perform  - GTB Tenderness Positive   -TTP over anterior and posterior hip joint  - TTP of the superior knee joint.    MUSCULOSKELETAL:  Atrophy of the left leg compared to the right  No deformities, edema, or skin discoloration are noted on visible skin. Good capillary refill.     NEURO: Bilateral upper and lower extremity coordination and muscle stretch reflexes are physiologic and symmetric.      NEUROLOGICAL EXAM:  MENTAL STATUS: A x O x 3, good concentration, speech is fluent and goal directed  MEMORY: recent and remote are intact  CN: CN2-12 grossly intact  MOTOR: 5/5 in all muscle groups on the right. HF 3/5 on the left, 4/5 KE, 4/5 KF with pain  DTRs: 3+ intact symmetric patella and 2 + achilles  Sensation:    -yes Loss of sensation in a left lower L-1 and L-2 on the left distribution.  Babinski: absent     Gait: Cane, abnormal. Short stride. Favors left leg

## 2022-11-01 ENCOUNTER — HOSPITAL ENCOUNTER (INPATIENT)
Facility: HOSPITAL | Age: 47
LOS: 1 days | Discharge: HOME | DRG: 442 | End: 2022-11-04
Attending: EMERGENCY MEDICINE | Admitting: STUDENT IN AN ORGANIZED HEALTH CARE EDUCATION/TRAINING PROGRAM
Payer: MEDICARE

## 2022-11-01 ENCOUNTER — TELEPHONE (OUTPATIENT)
Dept: PRIMARY CARE CLINIC | Facility: CLINIC | Age: 47
End: 2022-11-01
Payer: MEDICARE

## 2022-11-01 ENCOUNTER — OFFICE VISIT (OUTPATIENT)
Dept: PSYCHIATRY | Facility: CLINIC | Age: 47
End: 2022-11-01
Payer: MEDICARE

## 2022-11-01 DIAGNOSIS — F10.21 ALCOHOL USE DISORDER, SEVERE, IN EARLY REMISSION: Primary | ICD-10-CM

## 2022-11-01 DIAGNOSIS — I10 ESSENTIAL HYPERTENSION: Chronic | ICD-10-CM

## 2022-11-01 DIAGNOSIS — K76.82 HEPATIC ENCEPHALOPATHY: ICD-10-CM

## 2022-11-01 DIAGNOSIS — Z01.818 ENCOUNTER FOR PRE-TRANSPLANT EVALUATION FOR LIVER TRANSPLANT: Primary | ICD-10-CM

## 2022-11-01 DIAGNOSIS — I85.10 SECONDARY ESOPHAGEAL VARICES WITHOUT BLEEDING: ICD-10-CM

## 2022-11-01 LAB
ALBUMIN SERPL BCP-MCNC: 3 G/DL (ref 3.5–5.2)
ALP SERPL-CCNC: 140 U/L (ref 55–135)
ALT SERPL W/O P-5'-P-CCNC: 31 U/L (ref 10–44)
AMMONIA PLAS-SCNC: 112 UMOL/L (ref 10–50)
ANION GAP SERPL CALC-SCNC: 10 MMOL/L (ref 8–16)
AST SERPL-CCNC: 73 U/L (ref 10–40)
BASOPHILS # BLD AUTO: 0.03 K/UL (ref 0–0.2)
BASOPHILS NFR BLD: 0.5 % (ref 0–1.9)
BILIRUB SERPL-MCNC: 4.8 MG/DL (ref 0.1–1)
BUN SERPL-MCNC: 12 MG/DL (ref 6–30)
BUN SERPL-MCNC: 15 MG/DL (ref 6–20)
CALCIUM SERPL-MCNC: 9.2 MG/DL (ref 8.7–10.5)
CHLORIDE SERPL-SCNC: 105 MMOL/L (ref 95–110)
CHLORIDE SERPL-SCNC: 114 MMOL/L (ref 95–110)
CO2 SERPL-SCNC: 22 MMOL/L (ref 23–29)
CREAT SERPL-MCNC: 1.6 MG/DL (ref 0.5–1.4)
CREAT SERPL-MCNC: 1.9 MG/DL (ref 0.5–1.4)
DIFFERENTIAL METHOD: ABNORMAL
EOSINOPHIL # BLD AUTO: 0.1 K/UL (ref 0–0.5)
EOSINOPHIL NFR BLD: 1.3 % (ref 0–8)
ERYTHROCYTE [DISTWIDTH] IN BLOOD BY AUTOMATED COUNT: 16.2 % (ref 11.5–14.5)
EST. GFR  (NO RACE VARIABLE): 43 ML/MIN/1.73 M^2
GLUCOSE SERPL-MCNC: 101 MG/DL (ref 70–110)
GLUCOSE SERPL-MCNC: 97 MG/DL (ref 70–110)
HCT VFR BLD AUTO: 31.1 % (ref 40–54)
HCT VFR BLD CALC: 25 %PCV (ref 36–54)
HGB BLD-MCNC: 10.6 G/DL (ref 14–18)
IMM GRANULOCYTES # BLD AUTO: 0.02 K/UL (ref 0–0.04)
IMM GRANULOCYTES NFR BLD AUTO: 0.3 % (ref 0–0.5)
INR PPP: 1.4 (ref 0.8–1.2)
LIPASE SERPL-CCNC: 44 U/L (ref 4–60)
LYMPHOCYTES # BLD AUTO: 1.7 K/UL (ref 1–4.8)
LYMPHOCYTES NFR BLD: 27.7 % (ref 18–48)
MCH RBC QN AUTO: 33.7 PG (ref 27–31)
MCHC RBC AUTO-ENTMCNC: 34.1 G/DL (ref 32–36)
MCV RBC AUTO: 99 FL (ref 82–98)
MONOCYTES # BLD AUTO: 0.6 K/UL (ref 0.3–1)
MONOCYTES NFR BLD: 10.7 % (ref 4–15)
NEUTROPHILS # BLD AUTO: 3.6 K/UL (ref 1.8–7.7)
NEUTROPHILS NFR BLD: 59.5 % (ref 38–73)
NRBC BLD-RTO: 0 /100 WBC
PLATELET # BLD AUTO: 85 K/UL (ref 150–450)
PMV BLD AUTO: 10.4 FL (ref 9.2–12.9)
POC IONIZED CALCIUM: 0.97 MMOL/L (ref 1.06–1.42)
POC TCO2 (MEASURED): 18 MMOL/L (ref 23–29)
POTASSIUM BLD-SCNC: 3.1 MMOL/L (ref 3.5–5.1)
POTASSIUM SERPL-SCNC: 3.6 MMOL/L (ref 3.5–5.1)
PROT SERPL-MCNC: 8.5 G/DL (ref 6–8.4)
PROTHROMBIN TIME: 14.6 SEC (ref 9–12.5)
RBC # BLD AUTO: 3.15 M/UL (ref 4.6–6.2)
SAMPLE: ABNORMAL
SODIUM BLD-SCNC: 145 MMOL/L (ref 136–145)
SODIUM SERPL-SCNC: 137 MMOL/L (ref 136–145)
WBC # BLD AUTO: 6 K/UL (ref 3.9–12.7)

## 2022-11-01 PROCEDURE — 99285 PR EMERGENCY DEPT VISIT,LEVEL V: ICD-10-PCS | Mod: ,,, | Performed by: EMERGENCY MEDICINE

## 2022-11-01 PROCEDURE — 82330 ASSAY OF CALCIUM: CPT

## 2022-11-01 PROCEDURE — 83690 ASSAY OF LIPASE: CPT | Performed by: EMERGENCY MEDICINE

## 2022-11-01 PROCEDURE — 90853 GROUP PSYCHOTHERAPY: CPT | Mod: ,,, | Performed by: PSYCHOLOGIST

## 2022-11-01 PROCEDURE — 82140 ASSAY OF AMMONIA: CPT | Performed by: EMERGENCY MEDICINE

## 2022-11-01 PROCEDURE — 80053 COMPREHEN METABOLIC PANEL: CPT | Performed by: EMERGENCY MEDICINE

## 2022-11-01 PROCEDURE — 80047 BASIC METABLC PNL IONIZED CA: CPT

## 2022-11-01 PROCEDURE — 99285 EMERGENCY DEPT VISIT HI MDM: CPT | Mod: ,,, | Performed by: EMERGENCY MEDICINE

## 2022-11-01 PROCEDURE — G0378 HOSPITAL OBSERVATION PER HR: HCPCS

## 2022-11-01 PROCEDURE — 99285 EMERGENCY DEPT VISIT HI MDM: CPT

## 2022-11-01 PROCEDURE — 85025 COMPLETE CBC W/AUTO DIFF WBC: CPT | Performed by: EMERGENCY MEDICINE

## 2022-11-01 PROCEDURE — 90853 PR GROUP PSYCHOTHERAPY: ICD-10-PCS | Mod: ,,, | Performed by: PSYCHOLOGIST

## 2022-11-01 PROCEDURE — 85610 PROTHROMBIN TIME: CPT | Performed by: STUDENT IN AN ORGANIZED HEALTH CARE EDUCATION/TRAINING PROGRAM

## 2022-11-01 PROCEDURE — 25000003 PHARM REV CODE 250: Performed by: STUDENT IN AN ORGANIZED HEALTH CARE EDUCATION/TRAINING PROGRAM

## 2022-11-01 RX ORDER — ONDANSETRON 2 MG/ML
4 INJECTION INTRAMUSCULAR; INTRAVENOUS EVERY 8 HOURS PRN
Status: DISCONTINUED | OUTPATIENT
Start: 2022-11-02 | End: 2022-11-04 | Stop reason: HOSPADM

## 2022-11-01 RX ORDER — TALC
6 POWDER (GRAM) TOPICAL NIGHTLY PRN
Status: DISCONTINUED | OUTPATIENT
Start: 2022-11-02 | End: 2022-11-04 | Stop reason: HOSPADM

## 2022-11-01 RX ORDER — PROCHLORPERAZINE EDISYLATE 5 MG/ML
5 INJECTION INTRAMUSCULAR; INTRAVENOUS EVERY 6 HOURS PRN
Status: DISCONTINUED | OUTPATIENT
Start: 2022-11-02 | End: 2022-11-04 | Stop reason: HOSPADM

## 2022-11-01 RX ORDER — IBUPROFEN 200 MG
24 TABLET ORAL
Status: DISCONTINUED | OUTPATIENT
Start: 2022-11-02 | End: 2022-11-04 | Stop reason: HOSPADM

## 2022-11-01 RX ORDER — SODIUM CHLORIDE 0.9 % (FLUSH) 0.9 %
10 SYRINGE (ML) INJECTION
Status: DISCONTINUED | OUTPATIENT
Start: 2022-11-02 | End: 2022-11-04 | Stop reason: HOSPADM

## 2022-11-01 RX ORDER — LACTULOSE 10 G/15ML
30 SOLUTION ORAL 3 TIMES DAILY
Status: DISCONTINUED | OUTPATIENT
Start: 2022-11-01 | End: 2022-11-04 | Stop reason: HOSPADM

## 2022-11-01 RX ORDER — ACETAMINOPHEN 325 MG/1
650 TABLET ORAL EVERY 4 HOURS PRN
Status: DISCONTINUED | OUTPATIENT
Start: 2022-11-02 | End: 2022-11-04 | Stop reason: HOSPADM

## 2022-11-01 RX ORDER — NALOXONE HCL 0.4 MG/ML
0.02 VIAL (ML) INJECTION
Status: DISCONTINUED | OUTPATIENT
Start: 2022-11-02 | End: 2022-11-04 | Stop reason: HOSPADM

## 2022-11-01 RX ORDER — IBUPROFEN 200 MG
16 TABLET ORAL
Status: DISCONTINUED | OUTPATIENT
Start: 2022-11-02 | End: 2022-11-04 | Stop reason: HOSPADM

## 2022-11-01 RX ORDER — IPRATROPIUM BROMIDE AND ALBUTEROL SULFATE 2.5; .5 MG/3ML; MG/3ML
3 SOLUTION RESPIRATORY (INHALATION) EVERY 6 HOURS PRN
Status: DISCONTINUED | OUTPATIENT
Start: 2022-11-02 | End: 2022-11-04 | Stop reason: HOSPADM

## 2022-11-01 RX ADMIN — LACTULOSE 30 G: 20 SOLUTION ORAL at 10:11

## 2022-11-01 NOTE — TELEPHONE ENCOUNTER
----- Message from Jacqueline Viera sent at 11/1/2022  2:17 PM CDT -----  Contact: Shari/Janel/[phone] reference #[id number]  Janel said that she is calling in regards to needing to get a copy of pt's Progress note from the  mobility from 10/4, 3-4 progress notes, and a written order faxed to :[phone]. Please advise

## 2022-11-01 NOTE — PROGRESS NOTES
Group Psychotherapy    Site: [facility]    Clinical status of patient: Outpatient    11/1/2022    Length of service:[id number]    Referred by: Addictive Behavior Unit     Number of patients in attendance: 5    Target symptoms: alcohol abuse    Patient's response to intervention:  The patient's response to intervention is active listening.    Progress toward goals and other mental status changes:  The patient's progress toward goals is fair .    Interval history: Pt arrived late to group, but wanted to come in and listen. He was apologetic for his lateness and stated he remains sober. He would like to return next week and stated that he would be on time.    Diagnosis: Alcohol Use Disorder, in early remission    Plan: group psychotherapy    Return to clinic: 1 week

## 2022-11-02 LAB
ALBUMIN SERPL BCP-MCNC: 2.7 G/DL (ref 3.5–5.2)
ALP SERPL-CCNC: 121 U/L (ref 55–135)
ALT SERPL W/O P-5'-P-CCNC: 31 U/L (ref 10–44)
ANION GAP SERPL CALC-SCNC: 8 MMOL/L (ref 8–16)
AST SERPL-CCNC: 65 U/L (ref 10–40)
BASOPHILS # BLD AUTO: 0.03 K/UL (ref 0–0.2)
BASOPHILS NFR BLD: 0.5 % (ref 0–1.9)
BILIRUB SERPL-MCNC: 4.7 MG/DL (ref 0.1–1)
BUN SERPL-MCNC: 14 MG/DL (ref 6–20)
CALCIUM SERPL-MCNC: 9.1 MG/DL (ref 8.7–10.5)
CHLORIDE SERPL-SCNC: 112 MMOL/L (ref 95–110)
CO2 SERPL-SCNC: 20 MMOL/L (ref 23–29)
CREAT SERPL-MCNC: 1.9 MG/DL (ref 0.5–1.4)
DIFFERENTIAL METHOD: ABNORMAL
EOSINOPHIL # BLD AUTO: 0.1 K/UL (ref 0–0.5)
EOSINOPHIL NFR BLD: 2.3 % (ref 0–8)
ERYTHROCYTE [DISTWIDTH] IN BLOOD BY AUTOMATED COUNT: 16.4 % (ref 11.5–14.5)
EST. GFR  (NO RACE VARIABLE): 43 ML/MIN/1.73 M^2
GLUCOSE SERPL-MCNC: 90 MG/DL (ref 70–110)
HCT VFR BLD AUTO: 28.7 % (ref 40–54)
HGB BLD-MCNC: 9.8 G/DL (ref 14–18)
IMM GRANULOCYTES # BLD AUTO: 0.02 K/UL (ref 0–0.04)
IMM GRANULOCYTES NFR BLD AUTO: 0.3 % (ref 0–0.5)
INR PPP: 1.4 (ref 0.8–1.2)
LYMPHOCYTES # BLD AUTO: 1.8 K/UL (ref 1–4.8)
LYMPHOCYTES NFR BLD: 29 % (ref 18–48)
MCH RBC QN AUTO: 33.4 PG (ref 27–31)
MCHC RBC AUTO-ENTMCNC: 34.1 G/DL (ref 32–36)
MCV RBC AUTO: 98 FL (ref 82–98)
MONOCYTES # BLD AUTO: 0.7 K/UL (ref 0.3–1)
MONOCYTES NFR BLD: 12 % (ref 4–15)
NEUTROPHILS # BLD AUTO: 3.5 K/UL (ref 1.8–7.7)
NEUTROPHILS NFR BLD: 55.9 % (ref 38–73)
NRBC BLD-RTO: 0 /100 WBC
PLATELET # BLD AUTO: 85 K/UL (ref 150–450)
PMV BLD AUTO: 10.6 FL (ref 9.2–12.9)
POCT GLUCOSE: 122 MG/DL (ref 70–110)
POCT GLUCOSE: 88 MG/DL (ref 70–110)
POTASSIUM SERPL-SCNC: 3.6 MMOL/L (ref 3.5–5.1)
PROT SERPL-MCNC: 7.6 G/DL (ref 6–8.4)
PROTHROMBIN TIME: 14.7 SEC (ref 9–12.5)
RBC # BLD AUTO: 2.93 M/UL (ref 4.6–6.2)
SODIUM SERPL-SCNC: 140 MMOL/L (ref 136–145)
WBC # BLD AUTO: 6.17 K/UL (ref 3.9–12.7)

## 2022-11-02 PROCEDURE — 25000003 PHARM REV CODE 250: Performed by: HOSPITALIST

## 2022-11-02 PROCEDURE — 80053 COMPREHEN METABOLIC PANEL: CPT | Performed by: HOSPITALIST

## 2022-11-02 PROCEDURE — 99220 PR INITIAL OBSERVATION CARE,LEVL III: ICD-10-PCS | Mod: ,,, | Performed by: HOSPITALIST

## 2022-11-02 PROCEDURE — G0378 HOSPITAL OBSERVATION PER HR: HCPCS

## 2022-11-02 PROCEDURE — 85025 COMPLETE CBC W/AUTO DIFF WBC: CPT | Performed by: HOSPITALIST

## 2022-11-02 PROCEDURE — 25000003 PHARM REV CODE 250: Performed by: STUDENT IN AN ORGANIZED HEALTH CARE EDUCATION/TRAINING PROGRAM

## 2022-11-02 PROCEDURE — 85610 PROTHROMBIN TIME: CPT | Performed by: HOSPITALIST

## 2022-11-02 PROCEDURE — 80321 ALCOHOLS BIOMARKERS 1OR 2: CPT | Performed by: HOSPITALIST

## 2022-11-02 PROCEDURE — 99220 PR INITIAL OBSERVATION CARE,LEVL III: CPT | Mod: ,,, | Performed by: HOSPITALIST

## 2022-11-02 RX ORDER — TRAMADOL HYDROCHLORIDE 50 MG/1
50 TABLET ORAL EVERY 6 HOURS PRN
Status: DISCONTINUED | OUTPATIENT
Start: 2022-11-02 | End: 2022-11-04 | Stop reason: HOSPADM

## 2022-11-02 RX ORDER — CARVEDILOL 12.5 MG/1
12.5 TABLET ORAL 2 TIMES DAILY WITH MEALS
Status: DISCONTINUED | OUTPATIENT
Start: 2022-11-02 | End: 2022-11-04 | Stop reason: HOSPADM

## 2022-11-02 RX ORDER — NIFEDIPINE 30 MG/1
30 TABLET, EXTENDED RELEASE ORAL DAILY
Status: DISCONTINUED | OUTPATIENT
Start: 2022-11-02 | End: 2022-11-04 | Stop reason: HOSPADM

## 2022-11-02 RX ORDER — TRAMADOL HYDROCHLORIDE 50 MG/1
50 TABLET ORAL EVERY 12 HOURS PRN
COMMUNITY
End: 2022-11-08

## 2022-11-02 RX ORDER — HYDRALAZINE HYDROCHLORIDE 25 MG/1
25 TABLET, FILM COATED ORAL EVERY 8 HOURS PRN
Status: DISCONTINUED | OUTPATIENT
Start: 2022-11-02 | End: 2022-11-04 | Stop reason: HOSPADM

## 2022-11-02 RX ORDER — PANTOPRAZOLE SODIUM 40 MG/1
40 TABLET, DELAYED RELEASE ORAL 2 TIMES DAILY
Status: DISCONTINUED | OUTPATIENT
Start: 2022-11-02 | End: 2022-11-04 | Stop reason: HOSPADM

## 2022-11-02 RX ORDER — OXYCODONE HYDROCHLORIDE 5 MG/1
5 TABLET ORAL EVERY 6 HOURS PRN
Status: DISCONTINUED | OUTPATIENT
Start: 2022-11-02 | End: 2022-11-04 | Stop reason: HOSPADM

## 2022-11-02 RX ORDER — NORTRIPTYLINE HYDROCHLORIDE 25 MG/1
25 CAPSULE ORAL NIGHTLY
Status: DISCONTINUED | OUTPATIENT
Start: 2022-11-02 | End: 2022-11-04 | Stop reason: HOSPADM

## 2022-11-02 RX ORDER — ACAMPROSATE CALCIUM 333 MG/1
666 TABLET, DELAYED RELEASE ORAL 3 TIMES DAILY
Status: DISCONTINUED | OUTPATIENT
Start: 2022-11-02 | End: 2022-11-04 | Stop reason: HOSPADM

## 2022-11-02 RX ORDER — THIAMINE HCL 100 MG
100 TABLET ORAL DAILY
Status: DISCONTINUED | OUTPATIENT
Start: 2022-11-02 | End: 2022-11-04 | Stop reason: HOSPADM

## 2022-11-02 RX ADMIN — HYDRALAZINE HYDROCHLORIDE 25 MG: 25 TABLET, FILM COATED ORAL at 11:11

## 2022-11-02 RX ADMIN — PANTOPRAZOLE SODIUM 40 MG: 40 TABLET, DELAYED RELEASE ORAL at 09:11

## 2022-11-02 RX ADMIN — NIFEDIPINE 30 MG: 30 TABLET, FILM COATED, EXTENDED RELEASE ORAL at 09:11

## 2022-11-02 RX ADMIN — ACAMPROSATE CALCIUM 666 MG: 333 TABLET, DELAYED RELEASE ORAL at 09:11

## 2022-11-02 RX ADMIN — CARVEDILOL 12.5 MG: 12.5 TABLET, FILM COATED ORAL at 04:11

## 2022-11-02 RX ADMIN — CARVEDILOL 12.5 MG: 12.5 TABLET, FILM COATED ORAL at 09:11

## 2022-11-02 RX ADMIN — NORTRIPTYLINE HYDROCHLORIDE 25 MG: 25 CAPSULE ORAL at 09:11

## 2022-11-02 RX ADMIN — RIFAXIMIN 550 MG: 550 TABLET ORAL at 09:11

## 2022-11-02 RX ADMIN — RIFAXIMIN 550 MG: 550 TABLET ORAL at 01:11

## 2022-11-02 RX ADMIN — OXYCODONE 5 MG: 5 TABLET ORAL at 09:11

## 2022-11-02 RX ADMIN — ACAMPROSATE CALCIUM 666 MG: 333 TABLET, DELAYED RELEASE ORAL at 04:11

## 2022-11-02 RX ADMIN — LACTULOSE 30 G: 20 SOLUTION ORAL at 09:11

## 2022-11-02 RX ADMIN — LACTULOSE 30 G: 20 SOLUTION ORAL at 04:11

## 2022-11-02 RX ADMIN — Medication 100 MG: at 09:11

## 2022-11-02 RX ADMIN — TRAMADOL HYDROCHLORIDE 50 MG: 50 TABLET, COATED ORAL at 11:11

## 2022-11-02 NOTE — ED NOTES
I-STAT Chem-8+ Results:   Value Reference Range   Sodium 145 136-145 mmol/L   Potassium  3.1 3.5-5.1 mmol/L   Chloride 114  mmol/L   Ionized Calcium 0.97 1.06-1.42 mmol/L   CO2 (measured) 18 23-29 mmol/L   Glucose 97  mg/dL   BUN 12 6-30 mg/dL   Creatinine 1.6 0.5-1.4 mg/dL   Hematocrit 25 36-54%

## 2022-11-02 NOTE — ASSESSMENT & PLAN NOTE
-Sandrita hypertensive on presentation, home coreg ordered for now  -Hydralazine PO ordered PRN. Can uptitrate coreg if needed

## 2022-11-02 NOTE — ED TRIAGE NOTES
"Irvin Diaz Jr., a 47 y.o. male presents to the ED w/ complaint of AMS    Triage note: Patient states that he has a HX of liver and kidney disease, says that his amonia levels has been high in the past and that he feels"off" "like it might be high again"  Chief Complaint   Patient presents with    Altered Mental Status     Pt states he feels "incoherent".  Hx liver disease.   Pt states he has not been taking his Lactulose but took a dose today     Review of patient's allergies indicates:   Allergen Reactions    Ciprofloxacin hcl Hallucinations    Meperidine Hives     Pt medicated w/multiple medications (demerol, protonix, and zofran)  w/i 10 mins time frame prior to developing localized hives near IV site that med was administered. Pt reports he has/takes all other medications on daily basis.     Morphine Itching    Nsaids (non-steroidal anti-inflammatory drug)      Kidney Disease    Oxycodone      Weird, lucid dreams    Tylenol [acetaminophen]      Hx of liver disease     Past Medical History:   Diagnosis Date    Alcoholic cirrhosis of liver     Arthritis     ATN (acute tubular necrosis)     CHF (congestive heart failure)     Diverticulitis 01/2020    Esophageal varices     GERD (gastroesophageal reflux disease)     GI bleed     Hip arthritis     Left    Hypertension     Liver cirrhosis     Macrocytic anemia     Pulmonary embolism 08/2018    Unprovoked DVT.  Stop Coumadin due to GIB    Thrombocytopenia       "

## 2022-11-02 NOTE — ASSESSMENT & PLAN NOTE
-Pt. Followed by psychology, reports no recent alcohol use. Counseled on continued cessation  -PETH ordered and pending

## 2022-11-02 NOTE — SUBJECTIVE & OBJECTIVE
Past Medical History:   Diagnosis Date    Alcoholic cirrhosis of liver     Arthritis     ATN (acute tubular necrosis)     CHF (congestive heart failure)     Diverticulitis 01/2020    Esophageal varices     GERD (gastroesophageal reflux disease)     GI bleed     Hip arthritis     Left    Hypertension     Liver cirrhosis     Macrocytic anemia     Pulmonary embolism 08/2018    Unprovoked DVT.  Stop Coumadin due to GIB    Thrombocytopenia        Past Surgical History:   Procedure Laterality Date    ANKLE SURGERY      BLOCK, NERVE, PERIPHERAL Left 06/24/2022    Procedure: Left Hip femoral-obturator accessory nerve block;  Surgeon: Robbin De La Garza DO;  Location: [facility];  Service: Pain Management;  Laterality: Left;    COLON SURGERY  2007    COLONOSCOPY  09/05/2019    [facility]    COLONOSCOPY N/A 02/17/2021    Procedure: COLONOSCOPY;  Surgeon: Renea Billingsley MD;  Location: [facility];  Service: Endoscopy;  Laterality: N/A;    COLONOSCOPY W/ BIOPSIES  02/17/2021    ESOPHAGOGASTRODUODENOSCOPY N/A 07/26/2019    Procedure: EGD (ESOPHAGOGASTRODUODENOSCOPY);  Surgeon: Brian Trivedi MD;  Location: [facility];  Service: Endoscopy;  Laterality: N/A;    ESOPHAGOGASTRODUODENOSCOPY N/A 04/08/2020    Procedure: EGD (ESOPHAGOGASTRODUODENOSCOPY);  Surgeon: Donn Acuna MD;  Location: [facility];  Service: Endoscopy;  Laterality: N/A;    ESOPHAGOGASTRODUODENOSCOPY N/A 01/03/2021    Procedure: EGD (ESOPHAGOGASTRODUODENOSCOPY)- coffee ground emesis, hx varices;  Surgeon: Steve Chairez MD;  Location: [facility];  Service: Endoscopy;  Laterality: N/A;    ESOPHAGOGASTRODUODENOSCOPY N/A 05/03/2021    Procedure: EGD (ESOPHAGOGASTRODUODENOSCOPY);  Surgeon: Jeanmarie Ngo MD;  Location: [facility];  Service: Endoscopy;  Laterality: N/A;    ESOPHAGOGASTRODUODENOSCOPY N/A 07/19/2021    Procedure: ESOPHAGOGASTRODUODENOSCOPY (EGD);  Surgeon: Claudio Medeiros MD;  Location: [facility];  Service: Endoscopy;  Laterality: N/A;     ESOPHAGOGASTRODUODENOSCOPY  12/20/2021    ESOPHAGOGASTRODUODENOSCOPY N/A 12/20/2021    Procedure: EGD (ESOPHAGOGASTRODUODENOSCOPY);  Surgeon: Ajay Jackson MD;  Location: [facility];  Service: Endoscopy;  Laterality: N/A;    ESOPHAGOGASTRODUODENOSCOPY N/A 05/03/2022    Procedure: EGD (ESOPHAGOGASTRODUODENOSCOPY);  Surgeon: Brian Trivedi MD;  Location: [facility];  Service: Endoscopy;  Laterality: N/A;    ESOPHAGOGASTRODUODENOSCOPY N/A 7/7/2022    Procedure: EGD (ESOPHAGOGASTRODUODENOSCOPY);  Surgeon: Ajay Jackson MD;  Location: [facility];  Service: Endoscopy;  Laterality: N/A;    HERNIA REPAIR Left     Inguinal    UPPER GASTROINTESTINAL ENDOSCOPY N/A 07/07/2022       Review of patient's allergies indicates:   Allergen Reactions    Ciprofloxacin hcl Hallucinations    Meperidine Hives     Pt medicated w/multiple medications (demerol, protonix, and zofran)  w/i 10 mins time frame prior to developing localized hives near IV site that med was administered. Pt reports he has/takes all other medications on daily basis.     Morphine Itching    Nsaids (non-steroidal anti-inflammatory drug)      Kidney Disease    Oxycodone      Weird, lucid dreams    Tylenol [acetaminophen]      Hx of liver disease       No current facility-administered medications on file prior to encounter.     Current Outpatient Medications on File Prior to Encounter   Medication Sig    acamprosate (CAMPRAL) 333 mg tablet Take 2 tablets (666 mg total) by mouth 3 (three) times daily.    carvediloL (COREG) 12.5 MG tablet Take 1 tablet (12.5 mg total) by mouth 2 (two) times daily with meals.    clindamycin-benzoyl peroxide (BENZACLIN) gel Apply topically 2 (two) times daily.    ergocalciferol (ERGOCALCIFEROL) 50,000 unit Cap 1 tablet    famotidine (PEPCID) 20 MG tablet 1 tablet at bedtime as needed    ferrous gluconate (FERGON) 240 (27 FE) MG tablet Take 2 tablets (480 mg total) by mouth 2 (two) times daily with meals.    folic acid (FOLVITE) 1 MG  tablet Take 1 tablet (1 mg total) by mouth once daily.    nortriptyline (PAMELOR) 25 MG capsule Take 1 capsule (25 mg total) by mouth every evening.    ondansetron (ZOFRAN-ODT) 4 MG TbDL 1 tablet on the tongue and allow to dissolve    [START ON 11/13/2022] oxyCODONE (ROXICODONE) 5 MG immediate release tablet Take 1 tablet (5 mg total) by mouth 2 (two) times daily as needed for Pain.    [START ON 12/13/2022] oxyCODONE (ROXICODONE) 5 MG immediate release tablet Take 1 tablet (5 mg total) by mouth 2 (two) times daily as needed for Pain.    [START ON 1/12/2023] oxyCODONE (ROXICODONE) 5 MG immediate release tablet Take 1 tablet (5 mg total) by mouth 2 (two) times daily as needed for Pain.    pantoprazole (PROTONIX) 40 MG tablet Take 1 tablet (40 mg total) by mouth once daily. (Patient taking differently: Take 40 mg by mouth 2 (two) times daily.)    potassium chloride (K-TAB) 20 mEq 1 tablet with food    rifAXIMin (XIFAXAN) 550 mg Tab Take 1 tablet (550 mg total) by mouth 2 (two) times daily.    sildenafiL (VIAGRA) 100 MG tablet Take 1 tablet (100 mg total) by mouth daily as needed for Erectile Dysfunction.    thiamine 100 MG tablet Take 1 tablet (100 mg total) by mouth once daily.    triamcinolone acetonide 0.1% (KENALOG) 0.1 % ointment Apply topically 2 (two) times daily. Do not apply to face or anogenital regions    [DISCONTINUED] furosemide (LASIX) 40 MG tablet Take 0.5 tablets (20 mg total) by mouth once daily.    [DISCONTINUED] NIFEdipine (PROCARDIA-XL) 30 MG (OSM) 24 hr tablet Take 1 tablet (30 mg total) by mouth once daily.    [DISCONTINUED] spironolactone (ALDACTONE) 50 MG tablet Take 1 tablet (50 mg total) by mouth once daily. (Patient not taking: No sig reported)     Family History       Problem Relation (Age of Onset)    Bladder Cancer Father    Breast cancer Mother    Hypertension Mother, Father    Prostate cancer Father          Tobacco Use    Smoking status: Former     Types: Cigarettes     Quit date: 2000      Years since quittin.8    Smokeless tobacco: Never    Tobacco comments:     quit 20 years ago   Substance and Sexual Activity    Alcohol use: Not Currently     Comment: freq    Drug use: Yes     Types: Marijuana     Comment: occasionally    Sexual activity: Yes     Comment: occ     Review of Systems   Constitutional:  Positive for fatigue. Negative for activity change, chills, fever and unexpected weight change.   HENT:  Negative for congestion and sore throat.    Respiratory:  Negative for cough, shortness of breath and wheezing.    Cardiovascular:  Negative for chest pain, palpitations and leg swelling.   Gastrointestinal:  Negative for abdominal pain, blood in stool, nausea and vomiting.   Genitourinary:  Negative for dysuria and hematuria.   Musculoskeletal:  Negative for arthralgias and neck pain.   Skin:  Negative for color change and rash.   Neurological:  Negative for dizziness, seizures and numbness.   Psychiatric/Behavioral:  Positive for confusion and decreased concentration. Negative for hallucinations and suicidal ideas.    Objective:     Vital Signs (Most Recent):  Temp: 98 °F (36.7 °C) (22)  Pulse: 80 (22)  Resp: 16 (22)  BP: (!) 153/72 (22)  SpO2: 99 % (22)   Vital Signs (24h Range):  Temp:  [98 °F (36.7 °C)-98.6 °F (37 °C)] 98 °F (36.7 °C)  Pulse:  [80-99] 80  Resp:  [16-18] 16  SpO2:  [97 %-100 %] 99 %  BP: (153-190)/(72-99) 153/72     Weight: 90.5 kg (199 lb 8.3 oz)  Body mass index is 27.06 kg/m².    Physical Exam  Vitals reviewed.   Constitutional:       General: He is not in acute distress.     Appearance: He is well-developed.   HENT:      Head: Normocephalic and atraumatic.   Eyes:      General: Scleral icterus present.      Extraocular Movements: Extraocular movements intact.      Pupils: Pupils are equal, round, and reactive to light.   Neck:      Vascular: No JVD.      Trachea: No tracheal deviation.   Cardiovascular:      Rate  and Rhythm: Normal rate and regular rhythm.      Heart sounds: No murmur heard.    No friction rub. No gallop.   Pulmonary:      Effort: No respiratory distress.      Breath sounds: Normal breath sounds. No wheezing or rales.   Abdominal:      General: Bowel sounds are normal. There is no distension.      Palpations: Abdomen is soft. There is no mass.      Tenderness: There is no abdominal tenderness.   Musculoskeletal:         General: No deformity.      Cervical back: Neck supple.   Lymphadenopathy:      Cervical: No cervical adenopathy.   Skin:     General: Skin is warm and dry.      Findings: No rash.   Neurological:      Mental Status: He is alert and oriented to person, place, and time.         CRANIAL NERVES     CN III, IV, VI   Pupils are equal, round, and reactive to light.     Significant Labs: All pertinent labs within the past 24 hours have been reviewed.    Significant Imaging: I have reviewed all pertinent imaging results/findings within the past 24 hours.

## 2022-11-02 NOTE — ASSESSMENT & PLAN NOTE
-Slowed mentation, decreased focus and forgetfulness. Ammonia level 112 consisitent with HE  -Lactulose 30 g TID, xifaximin ordered. Encouraged outpatient compliance with lactulose  -Monitor in obs for clinical improvement

## 2022-11-02 NOTE — ASSESSMENT & PLAN NOTE
MELD-Na score: 22 at 11/1/2022  7:43 PM  MELD score: 22 at 11/1/2022  7:43 PM  Calculated from:  Serum Creatinine: 1.9 mg/dL at 11/1/2022  7:40 PM  Serum Sodium: 137 mmol/L at 11/1/2022  7:40 PM  Total Bilirubin: 4.8 mg/dL at 11/1/2022  7:40 PM  INR(ratio): 1.4 at 11/1/2022  7:43 PM  Age: 47 years  -Meld stable, no acute issues. Treat HE as above

## 2022-11-02 NOTE — ED PROVIDER NOTES
"Encounter Date: 11/1/2022       History     Chief Complaint   Patient presents with    Altered Mental Status     Pt states he feels "incoherent".  Hx liver disease.   Pt states he has not been taking his Lactulose but took a dose today     47-year-old male with history of cirrhosis with esophageal varices, CKD presenting to the ED with intermittent altered mental status, increased drowsiness at home.  The symptoms have been ongoing over the past few days.  Patient reports that he is not taking his lactulose because he does not like the fact it causes him diarrhea. No other associated symptoms.   Family member reports that patient has been intermittently confused at home. No vomiting, diarrhea, abd pain or fevers.      Review of patient's allergies indicates:   Allergen Reactions    Ciprofloxacin hcl Hallucinations    Meperidine Hives     Pt medicated w/multiple medications (demerol, protonix, and zofran)  w/i 10 mins time frame prior to developing localized hives near IV site that med was administered. Pt reports he has/takes all other medications on daily basis.     Morphine Itching    Nsaids (non-steroidal anti-inflammatory drug)      Kidney Disease    Oxycodone      Weird, lucid dreams    Tylenol [acetaminophen]      Hx of liver disease     Past Medical History:   Diagnosis Date    Alcoholic cirrhosis of liver     Arthritis     ATN (acute tubular necrosis)     CHF (congestive heart failure)     Diverticulitis 01/2020    Esophageal varices     GERD (gastroesophageal reflux disease)     GI bleed     Hip arthritis     Left    Hypertension     Liver cirrhosis     Macrocytic anemia     Pulmonary embolism 08/2018    Unprovoked DVT.  Stop Coumadin due to GIB    Thrombocytopenia      Past Surgical History:   Procedure Laterality Date    ANKLE SURGERY      BLOCK, NERVE, PERIPHERAL Left 06/24/2022    Procedure: Left Hip femoral-obturator accessory nerve block;  Surgeon: Robbin De La Garza DO;  Location: [facility] OR;  " Service: Pain Management;  Laterality: Left;    COLON SURGERY  2007    COLONOSCOPY  09/05/2019    [facility]    COLONOSCOPY N/A 02/17/2021    Procedure: COLONOSCOPY;  Surgeon: Renea Billingsley MD;  Location: [facility];  Service: Endoscopy;  Laterality: N/A;    COLONOSCOPY W/ BIOPSIES  02/17/2021    ESOPHAGOGASTRODUODENOSCOPY N/A 07/26/2019    Procedure: EGD (ESOPHAGOGASTRODUODENOSCOPY);  Surgeon: Brian Trivedi MD;  Location: [facility];  Service: Endoscopy;  Laterality: N/A;    ESOPHAGOGASTRODUODENOSCOPY N/A 04/08/2020    Procedure: EGD (ESOPHAGOGASTRODUODENOSCOPY);  Surgeon: Donn Acuna MD;  Location: [facility];  Service: Endoscopy;  Laterality: N/A;    ESOPHAGOGASTRODUODENOSCOPY N/A 01/03/2021    Procedure: EGD (ESOPHAGOGASTRODUODENOSCOPY)- coffee ground emesis, hx varices;  Surgeon: Steve Chairez MD;  Location: [facility];  Service: Endoscopy;  Laterality: N/A;    ESOPHAGOGASTRODUODENOSCOPY N/A 05/03/2021    Procedure: EGD (ESOPHAGOGASTRODUODENOSCOPY);  Surgeon: Jeanmarie Ngo MD;  Location: [facility];  Service: Endoscopy;  Laterality: N/A;    ESOPHAGOGASTRODUODENOSCOPY N/A 07/19/2021    Procedure: ESOPHAGOGASTRODUODENOSCOPY (EGD);  Surgeon: Claudio Medeiros MD;  Location: [facility];  Service: Endoscopy;  Laterality: N/A;    ESOPHAGOGASTRODUODENOSCOPY  12/20/2021    ESOPHAGOGASTRODUODENOSCOPY N/A 12/20/2021    Procedure: EGD (ESOPHAGOGASTRODUODENOSCOPY);  Surgeon: Ajay Jackson MD;  Location: [facility];  Service: Endoscopy;  Laterality: N/A;    ESOPHAGOGASTRODUODENOSCOPY N/A 05/03/2022    Procedure: EGD (ESOPHAGOGASTRODUODENOSCOPY);  Surgeon: Brian Trivedi MD;  Location: [facility];  Service: Endoscopy;  Laterality: N/A;    ESOPHAGOGASTRODUODENOSCOPY N/A 7/7/2022    Procedure: EGD (ESOPHAGOGASTRODUODENOSCOPY);  Surgeon: Ajay Jackson MD;  Location: [facility];  Service: Endoscopy;  Laterality: N/A;    HERNIA REPAIR Left     Inguinal    UPPER GASTROINTESTINAL ENDOSCOPY N/A 07/07/2022      Family History   Problem Relation Age of Onset    Hypertension Mother     Breast cancer Mother     Hypertension Father     Prostate cancer Father     Bladder Cancer Father      Social History     Tobacco Use    Smoking status: Former     Types: Cigarettes     Quit date: 2000     Years since quittin.8    Smokeless tobacco: Never    Tobacco comments:     quit 20 years ago   Substance Use Topics    Alcohol use: Not Currently     Comment: freq    Drug use: Yes     Types: Marijuana     Comment: occasionally     Review of Systems   Constitutional:  Positive for fatigue. Negative for fever.   HENT:  Negative for sore throat.    Respiratory:  Negative for shortness of breath.    Cardiovascular:  Negative for chest pain.   Gastrointestinal:  Negative for abdominal pain, diarrhea, nausea and vomiting.   Genitourinary:  Negative for dysuria.   Skin:  Negative for rash.   Neurological:  Negative for dizziness, weakness and light-headedness.   Hematological:  Does not bruise/bleed easily.   Psychiatric/Behavioral:  Positive for confusion.      Physical Exam     Initial Vitals [22 1711]   BP Pulse Resp Temp SpO2   (!) 181/99 96 16 98.6 °F (37 °C) 100 %      MAP       --         Physical Exam    Nursing note and vitals reviewed.  Constitutional: Vital signs are normal. He appears well-developed and well-nourished. He is not diaphoretic. He does not appear ill. No distress.   HENT:   Head: Normocephalic and atraumatic.   Eyes: Conjunctivae and EOM are normal. Right conjunctiva is not injected. Left conjunctiva is not injected.   Neck:   Normal range of motion.  Cardiovascular:  Normal rate, regular rhythm, S1 normal and S2 normal.     Exam reveals no gallop and no friction rub.       No murmur heard.  Pulmonary/Chest: No respiratory distress. He has no wheezes. He has no rhonchi. He has no rales. He exhibits no tenderness.   Abdominal: Abdomen is soft. He exhibits no distension and no mass. There is no abdominal  tenderness. There is no rebound and no guarding.   Musculoskeletal:         General: No edema.      Cervical back: Normal range of motion.     Neurological: He is alert and oriented to person, place, and time. He has normal strength. No cranial nerve deficit or sensory deficit.   Asterixis present   Skin: Skin is warm and dry. No rash noted. No erythema. No pallor.     ED Course   Procedures  Labs Reviewed   CBC W/ AUTO DIFFERENTIAL - Abnormal; Notable for the following components:       Result Value    RBC 3.15 (*)     Hemoglobin 10.6 (*)     Hematocrit 31.1 (*)     MCV 99 (*)     MCH 33.7 (*)     RDW 16.2 (*)     Platelets 85 (*)     All other components within normal limits   COMPREHENSIVE METABOLIC PANEL - Abnormal; Notable for the following components:    CO2 22 (*)     Creatinine 1.9 (*)     Total Protein 8.5 (*)     Albumin 3.0 (*)     Total Bilirubin 4.8 (*)     Alkaline Phosphatase 140 (*)     AST 73 (*)     eGFR 43.0 (*)     All other components within normal limits   AMMONIA - Abnormal; Notable for the following components:    Ammonia 112 (*)     All other components within normal limits   PROTIME-INR - Abnormal; Notable for the following components:    Prothrombin Time 14.6 (*)     INR 1.4 (*)     All other components within normal limits   ISTAT PROCEDURE - Abnormal; Notable for the following components:    POC Creatinine 1.6 (*)     POC Potassium 3.1 (*)     POC Chloride 114 (*)     POC TCO2 (MEASURED) 18 (*)     POC Ionized Calcium 0.97 (*)     POC Hematocrit 25 (*)     All other components within normal limits   LIPASE   URINALYSIS, REFLEX TO URINE CULTURE   ISTAT CHEM8          Imaging Results    None          Medications   lactulose 20 gram/30 mL solution Soln 30 g (30 g Oral Given 11/2/22 0940)   sodium chloride 0.9% flush 10 mL (has no administration in time range)   albuterol-ipratropium 2.5 mg-0.5 mg/3 mL nebulizer solution 3 mL (has no administration in time range)   melatonin tablet 6 mg (has  no administration in time range)   ondansetron injection 4 mg (has no administration in time range)   prochlorperazine injection Soln 5 mg (has no administration in time range)   acetaminophen tablet 650 mg (has no administration in time range)   naloxone 0.4 mg/mL injection 0.02 mg (has no administration in time range)   glucose chewable tablet 16 g (has no administration in time range)   glucose chewable tablet 24 g (has no administration in time range)   acamprosate tablet 666 mg (666 mg Oral Given 11/2/22 0940)   carvediloL tablet 12.5 mg (12.5 mg Oral Given 11/2/22 0940)   nortriptyline capsule 25 mg (25 mg Oral Not Given 11/2/22 0115)   pantoprazole EC tablet 40 mg (40 mg Oral Given 11/2/22 0940)   rifAXIMin tablet 550 mg (550 mg Oral Given 11/2/22 0942)   thiamine tablet 100 mg (100 mg Oral Given 11/2/22 0940)   hydrALAZINE tablet 25 mg (25 mg Oral Given 11/2/22 1120)   NIFEdipine 24 hr tablet 30 mg (30 mg Oral Given 11/2/22 0940)   traMADoL tablet 50 mg (50 mg Oral Given 11/2/22 1120)   oxyCODONE immediate release tablet 5 mg (has no administration in time range)     Medical Decision Making:   History:   Old Medical Records: I decided to obtain old medical records.  Initial Assessment:   47-year-old male with history of cirrhosis, CKD presenting to the ED with altered mental status intermittently as well as fatigue at home.  No fevers.  No abdominal tenderness palpation.  Similar presentations to last times he has been admitted for hepatic encephalopathy.  Has not been taking his lactulose at home.  Has asterixis on exam.  Otherwise nonfocal physical exam.    Differential includes was not limited to hepatic encephalopathy, electrolyte abnormalities, UTI, less likely infectious cause such as SBP as patient has no tenderness to palpation of abdomen.    CBC without leukocytosis or anemia.  CMP showed no significant electrolyte abnormalities concerning hospitalization.  Ammonia elevated at 112, given lactulose.   PT/INR mildly elevated.  Lipase within normal limits.  UA pending.  Discussed with Hospital Medicine for admission for hepatic encephalopathy.  Clinical Tests:   Lab Tests: Ordered and Reviewed  Other:   I have discussed this case with another health care provider.       <> Summary of the Discussion: Hospital medicine                        Clinical Impression:   Final diagnoses:  [K76.82] Hepatic encephalopathy      ED Disposition Condition    Observation                 Rohan Santillan MD  Resident  11/01/22 [id number]       Rohan Santillan MD  Resident  11/02/22 1305       Rohan Santillan MD  Resident  11/02/22 1301

## 2022-11-02 NOTE — HPI
46 yo M with PMHx decompensated EtOH cirrhosis who presents to the ED for confusion x a few days. Pt. Self reports mind fogginess and states that he feels off. He reports forgetfulness and poor concentration. He admits that he has not been taking his home lactulose and he believes that his ammonia level may be elevated because it has been elevated in the past. He denies any fevers, chills, nausea, vomiting, or abdominal pain, no new edema. Of note pt. Was just [facility] and he reports that he has remained sober since.

## 2022-11-02 NOTE — H&P
"[facility]  History & Physical    Patient Name: Irvin Diaz Jr.  MRN: [id number]  Patient Class: OP- Observation  Admission Date: 11/1/2022  Attending Physician: Adán Caba MD   Primary Care Provider: Edouard Gibson MD         Patient information was obtained from patient, past medical records and ER records.     Subjective:     Principal Problem:Hepatic encephalopathy    Chief Complaint:   Chief Complaint   Patient presents with    Altered Mental Status     Pt states he feels "incoherent".  Hx liver disease.   Pt states he has not been taking his Lactulose but took a dose today        HPI: 46 yo M with PMHx decompensated EtOH cirrhosis who presents to the ED for confusion x a few days. Pt. Self reports mind fogginess and states that he feels off. He reports forgetfulness and poor concentration. He admits that he has not been taking his home lactulose and he believes that his ammonia level may be elevated because it has been elevated in the past. He denies any fevers, chills, nausea, vomiting, or abdominal pain, no new edema. Of note pt. [facility] just [facility] and he reports that he has remained sober since.      Past Medical History:   Diagnosis Date    Alcoholic cirrhosis of liver     Arthritis     ATN (acute tubular necrosis)     CHF (congestive heart failure)     Diverticulitis 01/2020    Esophageal varices     GERD (gastroesophageal reflux disease)     GI bleed     Hip arthritis     Left    Hypertension     Liver cirrhosis     Macrocytic anemia     Pulmonary embolism 08/2018    Unprovoked DVT.  Stop Coumadin due to GIB    Thrombocytopenia        Past Surgical History:   Procedure Laterality Date    ANKLE SURGERY      BLOCK, NERVE, PERIPHERAL Left 06/24/2022    Procedure: Left Hip femoral-obturator accessory nerve block;  Surgeon: Robbin De La Garza DO;  Location: [facility] OR;  " Service: Pain Management;  Laterality: Left;    COLON SURGERY  2007    COLONOSCOPY  09/05/2019    [facility]    COLONOSCOPY N/A 02/17/2021    Procedure: COLONOSCOPY;  Surgeon: Renea Billingsley MD;  Location: [facility];  Service: Endoscopy;  Laterality: N/A;    COLONOSCOPY W/ BIOPSIES  02/17/2021    ESOPHAGOGASTRODUODENOSCOPY N/A 07/26/2019    Procedure: EGD (ESOPHAGOGASTRODUODENOSCOPY);  Surgeon: Brian Trivedi MD;  Location: [facility];  Service: Endoscopy;  Laterality: N/A;    ESOPHAGOGASTRODUODENOSCOPY N/A 04/08/2020    Procedure: EGD (ESOPHAGOGASTRODUODENOSCOPY);  Surgeon: Donn Acuna MD;  Location: [facility];  Service: Endoscopy;  Laterality: N/A;    ESOPHAGOGASTRODUODENOSCOPY N/A 01/03/2021    Procedure: EGD (ESOPHAGOGASTRODUODENOSCOPY)- coffee ground emesis, hx varices;  Surgeon: Steve Chairez MD;  Location: [facility];  Service: Endoscopy;  Laterality: N/A;    ESOPHAGOGASTRODUODENOSCOPY N/A 05/03/2021    Procedure: EGD (ESOPHAGOGASTRODUODENOSCOPY);  Surgeon: Jeanmarie Ngo MD;  Location: [facility];  Service: Endoscopy;  Laterality: N/A;    ESOPHAGOGASTRODUODENOSCOPY N/A 07/19/2021    Procedure: ESOPHAGOGASTRODUODENOSCOPY (EGD);  Surgeon: Claudio Medeiros MD;  Location: [facility];  Service: Endoscopy;  Laterality: N/A;    ESOPHAGOGASTRODUODENOSCOPY  12/20/2021    ESOPHAGOGASTRODUODENOSCOPY N/A 12/20/2021    Procedure: EGD (ESOPHAGOGASTRODUODENOSCOPY);  Surgeon: Ajay Jackson MD;  Location: [facility];  Service: Endoscopy;  Laterality: N/A;    ESOPHAGOGASTRODUODENOSCOPY N/A 05/03/2022    Procedure: EGD (ESOPHAGOGASTRODUODENOSCOPY);  Surgeon: Brian Trivedi MD;  Location: [facility];  Service: Endoscopy;  Laterality: N/A;    ESOPHAGOGASTRODUODENOSCOPY N/A 7/7/2022    Procedure: EGD (ESOPHAGOGASTRODUODENOSCOPY);  Surgeon: Ajay Jackson MD;  Location: [facility];  Service: Endoscopy;  Laterality: N/A;    HERNIA REPAIR Left     Inguinal    UPPER GASTROINTESTINAL ENDOSCOPY N/A  07/07/2022       Review of patient's allergies indicates:   Allergen Reactions    Ciprofloxacin hcl Hallucinations    Meperidine Hives     Pt medicated w/multiple medications (demerol, protonix, and zofran)  w/i 10 mins time frame prior to developing localized hives near IV site that med was administered. Pt reports he has/takes all other medications on daily basis.     Morphine Itching    Nsaids (non-steroidal anti-inflammatory drug)      Kidney Disease    Oxycodone      Weird, lucid dreams    Tylenol [acetaminophen]      Hx of liver disease       No current facility-administered medications on file prior to encounter.     Current Outpatient Medications on File Prior to Encounter   Medication Sig    acamprosate (CAMPRAL) 333 mg tablet Take 2 tablets (666 mg total) by mouth 3 (three) times daily.    carvediloL (COREG) 12.5 MG tablet Take 1 tablet (12.5 mg total) by mouth 2 (two) times daily with meals.    clindamycin-benzoyl peroxide (BENZACLIN) gel Apply topically 2 (two) times daily.    ergocalciferol (ERGOCALCIFEROL) 50,000 unit Cap 1 tablet    famotidine (PEPCID) 20 MG tablet 1 tablet at bedtime as needed    ferrous gluconate (FERGON) 240 (27 FE) MG tablet Take 2 tablets (480 mg total) by mouth 2 (two) times daily with meals.    folic acid (FOLVITE) 1 MG tablet Take 1 tablet (1 mg total) by mouth once daily.    nortriptyline (PAMELOR) 25 MG capsule Take 1 capsule (25 mg total) by mouth every evening.    ondansetron (ZOFRAN-ODT) 4 MG TbDL 1 tablet on the tongue and allow to dissolve    [START ON 11/13/2022] oxyCODONE (ROXICODONE) 5 MG immediate release tablet Take 1 tablet (5 mg total) by mouth 2 (two) times daily as needed for Pain.    [START ON 12/13/2022] oxyCODONE (ROXICODONE) 5 MG immediate release tablet Take 1 tablet (5 mg total) by mouth 2 (two) times daily as needed for Pain.    [START ON 1/12/2023] oxyCODONE (ROXICODONE) 5 MG immediate release tablet Take 1 tablet (5 mg total) by mouth  2 (two) times daily as needed for Pain.    pantoprazole (PROTONIX) 40 MG tablet Take 1 tablet (40 mg total) by mouth once daily. (Patient taking differently: Take 40 mg by mouth 2 (two) times daily.)    potassium chloride (K-TAB) 20 mEq 1 tablet with food    rifAXIMin (XIFAXAN) 550 mg Tab Take 1 tablet (550 mg total) by mouth 2 (two) times daily.    sildenafiL (VIAGRA) 100 MG tablet Take 1 tablet (100 mg total) by mouth daily as needed for Erectile Dysfunction.    thiamine 100 MG tablet Take 1 tablet (100 mg total) by mouth once daily.    triamcinolone acetonide 0.1% (KENALOG) 0.1 % ointment Apply topically 2 (two) times daily. Do not apply to face or anogenital regions    [DISCONTINUED] furosemide (LASIX) 40 MG tablet Take 0.5 tablets (20 mg total) by mouth once daily.    [DISCONTINUED] NIFEdipine (PROCARDIA-XL) 30 MG (OSM) 24 hr tablet Take 1 tablet (30 mg total) by mouth once daily.    [DISCONTINUED] spironolactone (ALDACTONE) 50 MG tablet Take 1 tablet (50 mg total) by mouth once daily. (Patient not taking: No sig reported)     Family History       Problem Relation (Age of Onset)    Bladder Cancer Father    Breast cancer Mother    Hypertension Mother, Father    Prostate cancer Father          Tobacco Use    Smoking status: Former     Types: Cigarettes     Quit date:      Years since quittin.8    Smokeless tobacco: Never    Tobacco comments:     quit 20 years ago   Substance and Sexual Activity    Alcohol use: Not Currently     Comment: freq    Drug use: Yes     Types: Marijuana     Comment: occasionally    Sexual activity: Yes     Comment: occ     Review of Systems   Constitutional:  Positive for fatigue. Negative for activity change, chills, fever and unexpected weight change.   HENT:  Negative for congestion and sore throat.    Respiratory:  Negative for cough, shortness of breath and wheezing.    Cardiovascular:  Negative for chest pain, palpitations and leg swelling.    Gastrointestinal:  Negative for abdominal pain, blood in stool, nausea and vomiting.   Genitourinary:  Negative for dysuria and hematuria.   Musculoskeletal:  Negative for arthralgias and neck pain.   Skin:  Negative for color change and rash.   Neurological:  Negative for dizziness, seizures and numbness.   Psychiatric/Behavioral:  Positive for confusion and decreased concentration. Negative for hallucinations and suicidal ideas.    Objective:     Vital Signs (Most Recent):  Temp: 98 °F (36.7 °C) (11/02/22 0008)  Pulse: 80 (11/02/22 0008)  Resp: 16 (11/02/22 0008)  BP: (!) 153/72 (11/02/22 0008)  SpO2: 99 % (11/02/22 0008)   Vital Signs (24h Range):  Temp:  [98 °F (36.7 °C)-98.6 °F (37 °C)] 98 °F (36.7 °C)  Pulse:  [80-99] 80  Resp:  [16-18] 16  SpO2:  [97 %-100 %] 99 %  BP: (153-190)/(72-99) 153/72     Weight: 90.5 kg (199 lb 8.3 oz)  Body mass index is 27.06 kg/m².    Physical Exam  Vitals reviewed.   Constitutional:       General: He is not in acute distress.     Appearance: He is well-developed.   HENT:      Head: Normocephalic and atraumatic.   Eyes:      General: Scleral icterus present.      Extraocular Movements: Extraocular movements intact.      Pupils: Pupils are equal, round, and reactive to light.   Neck:      Vascular: No JVD.      Trachea: No tracheal deviation.   Cardiovascular:      Rate and Rhythm: Normal rate and regular rhythm.      Heart sounds: No murmur heard.    No friction rub. No gallop.   Pulmonary:      Effort: No respiratory distress.      Breath sounds: Normal breath sounds. No wheezing or rales.   Abdominal:      General: Bowel sounds are normal. There is no distension.      Palpations: Abdomen is soft. There is no mass.      Tenderness: There is no abdominal tenderness.   Musculoskeletal:         General: No deformity.      Cervical back: Neck supple.   Lymphadenopathy:      Cervical: No cervical adenopathy.   Skin:     General: Skin is warm and dry.      Findings: No rash.    Neurological:      Mental Status: He is alert and oriented to person, place, and time.         CRANIAL NERVES     CN III, IV, VI   Pupils are equal, round, and reactive to light.     Significant Labs: All pertinent labs within the past 24 hours have been reviewed.    Significant Imaging: I have reviewed all pertinent imaging results/findings within the past 24 hours.    Assessment/Plan:     * Hepatic encephalopathy  -Slowed mentation, decreased focus and forgetfulness. Ammonia level 112 consisitent with HE  -Lactulose 30 g TID, xifaximin ordered. Encouraged outpatient compliance with lactulose  -Monitor in obs for clinical improvement      Alcohol use disorder, severe, dependence  -Pt. Followed by psychology, reports no recent alcohol use. Counseled on continued cessation  -PETH ordered and pending      Alcoholic cirrhosis of liver  MELD-Na score: 22 at 11/1/2022  7:43 PM  MELD score: 22 at 11/1/2022  7:43 PM  Calculated from:  Serum Creatinine: 1.9 mg/dL at 11/1/2022  7:40 PM  Serum Sodium: 137 mmol/L at 11/1/2022  7:40 PM  Total Bilirubin: 4.8 mg/dL at 11/1/2022  7:40 PM  INR(ratio): 1.4 at 11/1/2022  7:43 PM  Age: 47 years  -Meld stable, no acute issues. Treat HE as above      Essential hypertension  -Markdely hypertensive on presentation, home coreg ordered for now  -Hydralazine PO ordered PRN. Can uptitrate coreg if needed        VTE Risk Mitigation (From admission, onward)         Ordered     IP VTE LOW RISK PATIENT  Once         11/01/22 6684     Place sequential compression device  Until discontinued         11/01/22 3030                   Cristian Mercado MD  Department of Hospital Medicine   [facility] - Telemetry Stepdown

## 2022-11-03 LAB — POCT GLUCOSE: 109 MG/DL (ref 70–110)

## 2022-11-03 PROCEDURE — 99232 PR SUBSEQUENT HOSPITAL CARE,LEVL II: ICD-10-PCS | Mod: ,,, | Performed by: HOSPITALIST

## 2022-11-03 PROCEDURE — 25000003 PHARM REV CODE 250: Performed by: STUDENT IN AN ORGANIZED HEALTH CARE EDUCATION/TRAINING PROGRAM

## 2022-11-03 PROCEDURE — 20600001 HC STEP DOWN PRIVATE ROOM

## 2022-11-03 PROCEDURE — 25000003 PHARM REV CODE 250: Performed by: HOSPITALIST

## 2022-11-03 PROCEDURE — 99232 SBSQ HOSP IP/OBS MODERATE 35: CPT | Mod: ,,, | Performed by: HOSPITALIST

## 2022-11-03 RX ADMIN — PANTOPRAZOLE SODIUM 40 MG: 40 TABLET, DELAYED RELEASE ORAL at 09:11

## 2022-11-03 RX ADMIN — NIFEDIPINE 30 MG: 30 TABLET, FILM COATED, EXTENDED RELEASE ORAL at 09:11

## 2022-11-03 RX ADMIN — CARVEDILOL 12.5 MG: 12.5 TABLET, FILM COATED ORAL at 04:11

## 2022-11-03 RX ADMIN — PANTOPRAZOLE SODIUM 40 MG: 40 TABLET, DELAYED RELEASE ORAL at 08:11

## 2022-11-03 RX ADMIN — ACAMPROSATE CALCIUM 666 MG: 333 TABLET, DELAYED RELEASE ORAL at 09:11

## 2022-11-03 RX ADMIN — TRAMADOL HYDROCHLORIDE 50 MG: 50 TABLET, COATED ORAL at 09:11

## 2022-11-03 RX ADMIN — ACAMPROSATE CALCIUM 666 MG: 333 TABLET, DELAYED RELEASE ORAL at 04:11

## 2022-11-03 RX ADMIN — NORTRIPTYLINE HYDROCHLORIDE 25 MG: 25 CAPSULE ORAL at 08:11

## 2022-11-03 RX ADMIN — LACTULOSE 30 G: 20 SOLUTION ORAL at 04:11

## 2022-11-03 RX ADMIN — ACAMPROSATE CALCIUM 666 MG: 333 TABLET, DELAYED RELEASE ORAL at 08:11

## 2022-11-03 RX ADMIN — LACTULOSE 30 G: 20 SOLUTION ORAL at 09:11

## 2022-11-03 RX ADMIN — LACTULOSE 30 G: 20 SOLUTION ORAL at 08:11

## 2022-11-03 RX ADMIN — Medication 100 MG: at 09:11

## 2022-11-03 RX ADMIN — OXYCODONE 5 MG: 5 TABLET ORAL at 08:11

## 2022-11-03 RX ADMIN — RIFAXIMIN 550 MG: 550 TABLET ORAL at 08:11

## 2022-11-03 RX ADMIN — RIFAXIMIN 550 MG: 550 TABLET ORAL at 09:11

## 2022-11-03 RX ADMIN — CARVEDILOL 12.5 MG: 12.5 TABLET, FILM COATED ORAL at 09:11

## 2022-11-03 NOTE — PROGRESS NOTES
Progress Note  [facility] Medicine      Patient Name: Irvin Diaz Jr.  MRN: [id number]  Patient Class: OP- Observation  Admission Date: 11/1/2022  Attending Physician: Adán Caba MD   Primary Care Provider: Edouard Gibson MD          SUBJECTIVE:     Follow-up For:  Hepatic encephalopathy     Interval history/ROS:   11/3: clinically improving    HPI: 46 yo M with PMHx decompensated EtOH cirrhosis who presents to the ED for confusion x a few days. Pt. Self reports mind fogginess and states that he feels off. He reports forgetfulness and poor concentration. He admits that he has not been taking his home lactulose and he believes that his ammonia level may be elevated because it has been elevated in the past. He denies any fevers, chills, nausea, vomiting, or abdominal pain, no new edema. Of note pt. Was just [facility] and he reports that he has remained sober since.       OBJECTIVE:     Body mass index is 27.06 kg/m².    Vital Signs Range (Last 24H):  Temp:  [96.3 °F (35.7 °C)-99.1 °F (37.3 °C)]   Pulse:  [83-93]   Resp:  [16-20]   BP: (101-161)/(56-97)   SpO2:  [96 %-100 %]     I & O (Last 24H):  No intake or output data in the 24 hours ending 11/03/22 1651     Physical Exam:  Vitals reviewed.   Constitutional:       General: He is not in acute distress.     Appearance: He is well-developed.   HENT:      Head: Normocephalic and atraumatic.   Eyes:      General: Scleral icterus present.      Extraocular Movements: Extraocular movements intact.      Pupils: Pupils are equal, round, and reactive to light.   Neck:      Vascular: No JVD.      Trachea: No tracheal deviation.   Cardiovascular:      Rate and Rhythm: Normal rate and regular rhythm.      Heart sounds: No murmur heard.    No friction rub. No gallop.   Pulmonary:      Effort: No respiratory distress.      Breath sounds: Normal breath sounds. No wheezing or rales.   Abdominal:       General: Bowel sounds are normal. There is no distension.      Palpations: Abdomen is soft. There is no mass.      Tenderness: There is no abdominal tenderness.   Musculoskeletal:         General: No deformity.      Cervical back: Neck supple.   Lymphadenopathy:      Cervical: No cervical adenopathy.   Skin:     General: Skin is warm and dry.      Findings: No rash.   Neurological:      Mental Status: He is alert and oriented to person, place, and time.        Recent Labs   Lab 11/01/22 1940 11/02/22 0317    140   K 3.6 3.6    112*   CO2 22* 20*   BUN 15 14   CREATININE 1.9* 1.9*    90   CALCIUM 9.2 9.1     Recent Labs   Lab 11/01/22 1940 11/01/22 1943 11/02/22 0317   ALKPHOS 140*  --  121   ALT 31  --  31   AST 73*  --  65*   ALBUMIN 3.0*  --  2.7*   PROT 8.5*  --  7.6   BILITOT 4.8*  --  4.7*   INR  --  1.4* 1.4*       Recent Labs   Lab 11/01/22 1940 11/01/22 2001 11/02/22 0317   WBC 6.00  --  6.17   HGB 10.6*  --  9.8*   HCT 31.1* 25* 28.7*   PLT 85*  --  85*   GRAN 59.5  3.6  --  55.9  3.5   LYMPH 27.7  1.7  --  29.0  1.8   MONO 10.7  0.6  --  12.0  0.7       Recent Labs   Lab 11/02/22  0820 11/02/22  1111 11/03/22  0744   POCTGLUCOSE 88 122* 109       No results for input(s): TROPONINI in the last 168 hours.    Diagnostic Results:  Labs: Reviewed    acamprosate, 666 mg, Oral, TID  carvediloL, 12.5 mg, Oral, BID WM  lactulose, 30 g, Oral, TID  NIFEdipine, 30 mg, Oral, Daily  nortriptyline, 25 mg, Oral, QHS  pantoprazole, 40 mg, Oral, BID  rifAXIMin, 550 mg, Oral, BID  thiamine, 100 mg, Oral, Daily        As Needed acetaminophen, albuterol-ipratropium, glucose, glucose, hydrALAZINE, melatonin, naloxone, ondansetron, oxyCODONE, prochlorperazine, sodium chloride 0.9%, traMADoL    ASSESSMENT/PLAN:     Assessment: Irvin Diaz Jr. is a 47 y.o. male here with:     Active Hospital Problems    Diagnosis  POA    *Hepatic encephalopathy [K76.82]  Yes    Alcohol use disorder, severe,  dependence [F10.20]  Yes     Chronic    Essential hypertension [I10]  Yes     Chronic    Alcoholic cirrhosis of liver [K70.30]  Yes      Resolved Hospital Problems   No resolved problems to display.        Plan:    * Hepatic encephalopathy  -Slowed mentation, decreased focus and forgetfulness. Ammonia level 112 consisitent with HE  -Lactulose 30 g TID, xifaximin ordered. Encouraged outpatient compliance with lactulose  -Monitor in obs for clinical improvement        Alcohol use disorder, severe, dependence  -Pt. Followed by psychology, reports no recent alcohol use. Counseled on continued cessation  -PETH ordered and pending        Alcoholic cirrhosis of liver  MELD-Na score: 22 at 11/1/2022  7:43 PM  MELD score: 22 at 11/1/2022  7:43 PM  Calculated from:  Serum Creatinine: 1.9 mg/dL at 11/1/2022  7:40 PM  Serum Sodium: 137 mmol/L at 11/1/2022  7:40 PM  Total Bilirubin: 4.8 mg/dL at 11/1/2022  7:40 PM  INR(ratio): 1.4 at 11/1/2022  7:43 PM  Age: 47 years  -Meld stable, no acute issues. Treat HE as above        Essential hypertension  -Markdely hypertensive on presentation, home coreg ordered for now  -Hydralazine PO ordered PRN. Can uptitrate coreg if needed              HIGH RISK CONDITION(S):  Patient has a condition that poses threat to life and bodily function: hepatic encephalopathy      Chanel Hernández MD

## 2022-11-03 NOTE — PLAN OF CARE
[facility] - Telemetry Stepdown  Initial Discharge Assessment       Primary Care Provider: Edouard Gibson MD    Admission Diagnosis: Hepatic encephalopathy [K76.82]    Admission Date: 11/1/2022  Expected Discharge Date: 11/4/2022    Discharge Barriers Identified: None    Payor: MEDICARE / Plan: MEDICARE PART A & B / Product Type: Government /     Extended Emergency Contact Information  Primary Emergency Contact: Irvin Macias   [facility]  Home Phone: [phone]  Work Phone: [phone]  Mobile Phone: [phone]  Relation: Father  Preferred language: English   needed? No  Secondary Emergency Contact: Sharon Macisa  Address: [address] [place] [address]  Home Phone: [phone]  Mobile Phone: [phone]  Relation: Mother  Preferred language: English   needed? No    Discharge Plan A: Home, Home with family  Discharge Plan B: Home      [facility]  [address]  [facility] [address]  Phone: [phone] Fax: [phone]    [facility]  [address] [facility] [address]  Phone: [phone] Fax: [phone]      Initial Assessment (most recent)       Adult Discharge Assessment - 11/03/22 1327          Discharge Assessment    Assessment Type Discharge Planning Assessment     Confirmed/corrected address, phone number and insurance Yes     Confirmed Demographics Correct on Facesheet     Source of Information patient;family;health record     Reason For Admission Hepatic encephaloapthy     Lives With parent(s)     Do you expect to return to your current living situation? Yes     Do you have help at home or someone to help you manage your care at home? Yes     Who are your caregiver(s) and their phone number(s)? Irvin Macias (Father) [phone], Sharon Macias (Mother) [phone]     Current cognitive status: Alert/Oriented     Walking or Climbing Stairs Difficulty  ambulation difficulty, requires equipment     Mobility Management Walker, Standard, wheelchair.     Dressing/Bathing Difficulty none     Equipment Currently Used at Home walker, standard;wheelchair;other (see comments)   Pt request a power scooter.    Readmission within 30 days? No     Patient currently being followed by outpatient case management? No     Do you currently have service(s) that help you manage your care at home? No     Do you take prescription medications? Yes     Do you have prescription coverage? Yes     Coverage Medicare A & B     Do you have any problems affording any of your prescribed medications? No     Is the patient taking medications as prescribed? yes     Who is going to help you get home at discharge? Pt's father (Irvin Sharpe.) will provide transportation home.     How do you get to doctors appointments? family or friend will provide     Are you on dialysis? No     Do you take coumadin? No     Discharge Plan A Home;Home with family     Discharge Plan B Home     DME Needed Upon Discharge  other (see comments)   Pt requesting a power scooter.  Will need to follow up with PCP.    Discharge Plan discussed with: Patient;Spouse/sig other     Name(s) and Number(s) Irvin [phone]     Discharge Barriers Identified None        Physical Activity    On average, how many days per week do you engage in moderate to strenuous exercise (like a brisk walk)? 0 days     On average, how many minutes do you engage in exercise at this level? 0 min        Financial Resource Strain    How hard is it for you to pay for the very basics like food, housing, medical care, and heating? Not very hard        Housing Stability    In the last 12 months, was there a time when you were not able to pay the mortgage or rent on time? No     In the last 12 months, was there a time when you did not have a steady place to sleep or slept in a shelter (including now)? No        Transportation Needs    In the  past 12 months, has lack of transportation kept you from medical appointments or from getting medications? No     In the past 12 months, has lack of transportation kept you from meetings, work, or from getting things needed for daily living? No        Food Insecurity    Within the past 12 months, you worried that your food would run out before you got the money to buy more. Never true     Within the past 12 months, the food you bought just didn't last and you didn't have money to get more. Never true        Social Connections    In a typical week, how many times do you talk on the phone with family, friends, or neighbors? More than three times a week     How often do you get together with friends or relatives? More than three times a week     How often do you attend Hindu or Tenriism services? Never     Do you belong to any clubs or organizations such as Hindu groups, unions, fraternal or athletic groups, or school groups? Yes     How often do you attend meetings of the clubs or organizations you belong to? Never     Are you , , , , never , or living with a partner?         Alcohol Use    Q1: How often do you have a drink containing alcohol? 2-3 times a week     Q2: How many drinks containing alcohol do you have on a typical day when you are drinking? 3 or 4     Q3: How often do you have six or more drinks on one occasion? Weekly                      SW met with pt and pt's father to complete initial discharge assessment.  No hospital readmissions.  Pt's father will provide transportation home.      Pt lives with his parents.  There are 8 steps for entry into home.  Pt requires assistance with mobility and ADLS.  Pt has the following DME:  Walker Standard, and wheel chair.  Pt's parents are available to provide help at home.      Pt does not receive coumadin or dialysis.  Pt reported he receives behavioral health services with [facility].      Pt  inquired about process for receiving a power scooter.      Disp:  Home. No needs.   SW provided pt with contact number for [employer] [phone] to follow up with request for power scooter.      Nichole Garrett LMSW  PRN-  [facility]  Ext. [phone]

## 2022-11-04 VITALS
DIASTOLIC BLOOD PRESSURE: 58 MMHG | TEMPERATURE: 98 F | BODY MASS INDEX: 27.02 KG/M2 | RESPIRATION RATE: 19 BRPM | OXYGEN SATURATION: 100 % | SYSTOLIC BLOOD PRESSURE: 94 MMHG | HEART RATE: 83 BPM | HEIGHT: 72 IN | WEIGHT: 199.5 LBS

## 2022-11-04 LAB
CLINICAL BIOCHEMIST REVIEW: NORMAL
PETH 16:0/18.1 (POPETH): <10 NG/ML
PETH 16:0/18.2 (PLPETH): <10 NG/ML

## 2022-11-04 PROCEDURE — 99239 PR HOSPITAL DISCHARGE DAY,>30 MIN: ICD-10-PCS | Mod: ,,, | Performed by: HOSPITALIST

## 2022-11-04 PROCEDURE — 25000003 PHARM REV CODE 250: Performed by: STUDENT IN AN ORGANIZED HEALTH CARE EDUCATION/TRAINING PROGRAM

## 2022-11-04 PROCEDURE — 99239 HOSP IP/OBS DSCHRG MGMT >30: CPT | Mod: ,,, | Performed by: HOSPITALIST

## 2022-11-04 PROCEDURE — 25000003 PHARM REV CODE 250: Performed by: HOSPITALIST

## 2022-11-04 RX ORDER — NIFEDIPINE 30 MG/1
30 TABLET, EXTENDED RELEASE ORAL DAILY
Qty: 30 TABLET | Refills: 2 | Status: SHIPPED | OUTPATIENT
Start: 2022-11-05 | End: 2022-11-04 | Stop reason: HOSPADM

## 2022-11-04 RX ORDER — LACTULOSE 10 G/15ML
20 SOLUTION ORAL; RECTAL 3 TIMES DAILY
Qty: 2700 ML | Refills: 2 | Status: SHIPPED | OUTPATIENT
Start: 2022-11-04 | End: 2022-12-04

## 2022-11-04 RX ORDER — AMLODIPINE BESYLATE 5 MG/1
5 TABLET ORAL DAILY
Qty: 30 TABLET | Refills: 2 | Status: SHIPPED | OUTPATIENT
Start: 2022-11-04 | End: 2022-12-22

## 2022-11-04 RX ORDER — PANTOPRAZOLE SODIUM 40 MG/1
40 TABLET, DELAYED RELEASE ORAL 2 TIMES DAILY
Qty: 60 TABLET | Refills: 11 | Status: ON HOLD
Start: 2022-11-04 | End: 2023-04-29 | Stop reason: SDUPTHER

## 2022-11-04 RX ADMIN — ACAMPROSATE CALCIUM 666 MG: 333 TABLET, DELAYED RELEASE ORAL at 08:11

## 2022-11-04 RX ADMIN — LACTULOSE 30 G: 20 SOLUTION ORAL at 08:11

## 2022-11-04 RX ADMIN — NIFEDIPINE 30 MG: 30 TABLET, FILM COATED, EXTENDED RELEASE ORAL at 08:11

## 2022-11-04 RX ADMIN — TRAMADOL HYDROCHLORIDE 50 MG: 50 TABLET, COATED ORAL at 08:11

## 2022-11-04 RX ADMIN — Medication 100 MG: at 08:11

## 2022-11-04 RX ADMIN — RIFAXIMIN 550 MG: 550 TABLET ORAL at 08:11

## 2022-11-04 RX ADMIN — PANTOPRAZOLE SODIUM 40 MG: 40 TABLET, DELAYED RELEASE ORAL at 08:11

## 2022-11-04 RX ADMIN — CARVEDILOL 12.5 MG: 12.5 TABLET, FILM COATED ORAL at 08:11

## 2022-11-04 NOTE — PLAN OF CARE
Patient discharging home with no post-acute needs.    Krystal Nelson, [facility]  Case Management Department  [email or web address]       11/04/22 1213   Final Note   Assessment Type Final Discharge Note   Anticipated Discharge Disposition Home   What phone number can be called within the next 1-3 days to see how you are doing after discharge? [phone]   Hospital Resources/Appts/Education Provided Provided patient/caregiver with written discharge plan information;Appointments scheduled by Navigator/Coordinator;Appointments scheduled and added to AVS   Post-Acute Status   Discharge Delays None known at this time       Future Appointments   Date Time Provider Department Center   11/8/2022 10:00 AM Ciarra Kulkarni PhD [facility] PSYCHOL [address]   11/8/2022  2:15 PM Hossein De La O MD [facility]   11/15/2022 10:00 AM Ciarra Kulkarni, PhD [facility] PSYCHOL [address]   11/15/2022 12:00 PM Jacqui Hernandez [facility] SOCL [address]   11/22/2022 10:00 AM Ciarra Kulkarni PhD [facility] PSYCHOL [address]   11/25/2022 10:00 AM LAB, APPOINTMENT [place] [facility] VNP [facility]   11/29/2022 10:00 AM Ciarra Kulkarni PhD [facility] PSYCHOL [address]   12/1/2022  2:00 PM Ambreen Hou MD [facility] NEPHRO [address]   12/6/2022 10:00 AM Ciarra Kulkarni PhD [facility] PSYCHOL [address]   12/13/2022 10:00 AM Ciarra Kulkarni PhD [facility] PSYCH[facility] [address]   12/20/2022 10:00 AM Ciarra Kulkarni, PhD [facility] PSYCHOL [address]   12/28/2022 10:00 AM LAB, APPOINTMENT [place] [facility] LAB VNP [facility]   1/4/2023 10:30 AM Edouard Gibson MD [facility]   1/13/2023  3:30 PM Jacek Arreaga MD [facility] HEPAT [address]   1/31/2023 10:00 AM Dariusz Doyle NP [facility] PSYCH [facility]   2/1/2023  9:00 AM Robbin De La Garza,  [facility]

## 2022-11-04 NOTE — NURSING
Discharge instructions discussed with patient and family at the bedside, all questions were answered.  Prescriptions were delivered to the room and iv and telemetry monitor were removed and returned to proper place.  Patient wheeled out in personal wheelchair by family.

## 2022-11-04 NOTE — DISCHARGE SUMMARY
DISCHARGE SUMMARY  Hospital Medicine    Team: [facility]    Patient Name: Irvin Diaz Jr.  YOB: 1975    Admit Date: 11/1/2022    Discharge Date: 11/04/2022    Discharge Attending Physician: Chanel Hernández MD     Admitting Resident    Diagnoses:  Active Hospital Problems    Diagnosis  POA    *Hepatic encephalopathy [K76.82]  Yes    Alcohol use disorder, severe, dependence [F10.20]  Yes     Chronic    Essential hypertension [I10]  Yes     Chronic    Alcoholic cirrhosis of liver [K70.30]  Yes      Resolved Hospital Problems   No resolved problems to display.       Discharged Condition: admit problems have stabilized       HOSPITAL COURSE:      Initial Presentation:    48 yo M with PMHx decompensated EtOH cirrhosis who presents to the ED for confusion x a few days. Pt. Self reports mind fogginess and states that he feels off. He reports forgetfulness and poor concentration. He admits that he has not been taking his home lactulose and he believes that his ammonia level may be elevated because it has been elevated in the past. He denies any fevers, chills, nausea, vomiting, or abdominal pain, no new edema. Of note pt. [facility] and he reports that he has remained sober since.       Course of Principle Problem for Admission:    * Hepatic encephalopathy  -Slowed mentation, decreased focus and forgetfulness. Ammonia level 112 consisitent with HE  -Lactulose 30 g TID, xifaximin ordered. Encouraged outpatient compliance with lactulose    Other Medical Problems Addressed in the Hospital:    Alcoholic cirrhosis of liver  MELD-Na score: 22 at 11/1/2022  7:43 PM  MELD score: 22 at 11/1/2022  7:43 PM  Calculated from:  Serum Creatinine: 1.9 mg/dL at 11/1/2022  7:40 PM  Serum Sodium: 137 mmol/L at 11/1/2022  7:40 PM  Total Bilirubin: 4.8 mg/dL at 11/1/2022  7:40 PM  INR(ratio): 1.4 at 11/1/2022  7:43 PM  Age: 47 years  -Meld  stable, no acute issues. Treat HE as above        Essential hypertension  -Markdely hypertensive on presentation, home coreg ordered for now  -Hydralazine PO ordered PRN. Can uptitrate coreg if needed    CONSULTS: hepatology     Last CBC/BMP/HgbA1c (if applicable):  Recent Results (from the past 336 hour(s))   CBC with Automated Differential    Collection Time: 11/02/22  3:17 AM   Result Value Ref Range    WBC 6.17 3.90 - 12.70 K/uL    Hemoglobin 9.8 (L) 14.0 - 18.0 g/dL    Hematocrit 28.7 (L) 40.0 - 54.0 %    Platelets 85 (L) 150 - 450 K/uL   CBC W/ AUTO DIFFERENTIAL    Collection Time: 11/01/22  7:40 PM   Result Value Ref Range    WBC 6.00 3.90 - 12.70 K/uL    Hemoglobin 10.6 (L) 14.0 - 18.0 g/dL    Hematocrit 31.1 (L) 40.0 - 54.0 %    Platelets 85 (L) 150 - 450 K/uL   CBC auto differential    Collection Time: 10/25/22 10:35 AM   Result Value Ref Range    WBC 5.33 3.90 - 12.70 K/uL    Hemoglobin 9.5 (L) 14.0 - 18.0 g/dL    Hematocrit 28.5 (L) 40.0 - 54.0 %    Platelets 74 (L) 150 - 450 K/uL     No results found for this or any previous visit (from the past 336 hour(s)).  Lab Results   Component Value Date    HGBA1C 5.7 11/12/2020       Disposition:  Home       Future Scheduled Appointments:  Future Appointments   Date Time Provider Department Center   11/8/2022 10:00 AM Ciarra Kulkarni, PhD [facility] PSYCHOL [facility]   11/8/2022  2:15 PM Hossein De La O MD [facility]   11/15/2022 10:00 AM Ciarra Kulkarni, PhD [facility] PSYCHOL [facility]   11/15/2022 12:00 PM SHERIF WalkerW [facility] SOCL [facility]   11/22/2022 10:00 AM Ciarra Kulkarni, PhD [facility] PSYCHOL [facility]   11/25/2022 10:00 AM LAB, APPOINTMENT [facility] LAB P [facility]   11/29/2022 10:00 AM Ciarra Kulkarni, PhD [facility] PSYCHOL [facility]   12/1/2022  2:00 PM Ambreen Hou MD [facility] NEPHRO [facility]   12/6/2022 10:00 AM Ciarra Kulkarni, PhD [facility] PSYCHOL [facility]   12/13/2022 10:00 AM Ciarra Kulkarni, PhD [facility] PSYCHOL [facility]   12/20/2022 10:00 AM Ciarra MOROCHO  Cayla, PhD [facility] PSYCHOL [facility]   12/28/2022 10:00 AM LAB, APPOINTMENT [place] [facility] VNP [facility]   1/4/2023 10:30 AM Edouard Gibson MD [facility]   1/13/2023  3:30 PM Jacek Arreaga MD [facility]   1/31/2023 10:00 AM Dariusz Doyle NP [facility] PSYCH [facility]   2/1/2023  9:00 AM Robbin De La Garza DO [facility]       Follow-up Plans from This Hospitalization:   Hepatology     Discharge Medication List:     Medication List        START taking these medications      amLODIPine 5 MG tablet  Commonly known as: NORVASC  Take 1 tablet (5 mg total) by mouth once daily.     CONSTULOSE 10 gram/15 mL solution  Generic drug: lactulose  Take 30 mLs (20 g total) by mouth 3 (three) times daily. TITRATE TO 3-4 BOWEL MOVEMENTS DAILY.            CONTINUE taking these medications      acamprosate 333 mg tablet  Commonly known as: CAMPRAL  Take 2 tablets (666 mg total) by mouth 3 (three) times daily.     carvediloL 12.5 MG tablet  Commonly known as: COREG  Take 1 tablet (12.5 mg total) by mouth 2 (two) times daily with meals.     clindamycin-benzoyl peroxide gel  Commonly known as: BENZACLIN  Apply topically 2 (two) times daily.     ergocalciferol 50,000 unit Cap  Commonly known as: ERGOCALCIFEROL     famotidine 20 MG tablet  Commonly known as: PEPCID     FERATE 240 (27 FE) MG tablet  Generic drug: ferrous gluconate  Take 2 tablets (480 mg total) by mouth 2 (two) times daily with meals.     folic acid 1 MG tablet  Commonly known as: FOLVITE  Take 1 tablet (1 mg total) by mouth once daily.     nortriptyline 25 MG capsule  Commonly known as: PAMELOR  Take 1 capsule (25 mg total) by mouth every evening.     * oxyCODONE 5 MG immediate release tablet  Commonly known as: ROXICODONE  Take 1 tablet (5 mg total) by mouth 2 (two) times daily as needed for Pain.  Start taking on: November 13, 2022     * oxyCODONE 5 MG immediate release tablet  Commonly known as: ROXICODONE  Take 1  tablet (5 mg total) by mouth 2 (two) times daily as needed for Pain.  Start taking on: December 13, 2022     * oxyCODONE 5 MG immediate release tablet  Commonly known as: ROXICODONE  Take 1 tablet (5 mg total) by mouth 2 (two) times daily as needed for Pain.  Start taking on: January 12, 2023     pantoprazole 40 MG tablet  Commonly known as: PROTONIX  Take 1 tablet (40 mg total) by mouth 2 (two) times daily.     sildenafiL 100 MG tablet  Commonly known as: VIAGRA  Take 1 tablet (100 mg total) by mouth daily as needed for Erectile Dysfunction.     thiamine 100 MG tablet  Take 1 tablet (100 mg total) by mouth once daily.     traMADoL 50 mg tablet  Commonly known as: ULTRAM     XIFAXAN 550 mg Tab  Generic drug: rifAXIMin  Take 1 tablet (550 mg total) by mouth 2 (two) times daily.           * This list has 3 medication(s) that are the same as other medications prescribed for you. Read the directions carefully, and ask your doctor or other care provider to review them with you.                   Where to Get Your Medications        These medications were sent to [facility] [address], [place] [address]      Hours: Mon-Fri 7a-7p, Sat-Sun 10a-4p Phone: [phone]   amLODIPine 5 MG tablet  CONSTULOSE 10 gram/15 mL solution       Information about where to get these medications is not yet available    Ask your nurse or doctor about these medications  pantoprazole 40 MG tablet          Patient Instructions:  No discharge procedures on file.    Signing Physician:  Chanel Hernández MD

## 2022-11-04 NOTE — PLAN OF CARE
SSC met with patient/family at bedside. Patient experience rounding completed and reviewed the following.     Do you know your discharge plan? Yes,    If yes, what is the plan? Home    If you are discharging home, do you have help at home? Yes    Do you think you will need help at home at discharge? Yes    Have you discussed your needs and preferences with your SW/CM? Yes    Assigned SW/CM notified of any patient/family needs or concerns.

## 2022-11-05 ENCOUNTER — TELEPHONE (OUTPATIENT)
Dept: PRIMARY CARE CLINIC | Facility: CLINIC | Age: 47
End: 2022-11-05
Payer: MEDICARE

## 2022-11-05 NOTE — TELEPHONE ENCOUNTER
----- Message from Edouard Gibson MD sent at 11/4/2022  4:59 PM CDT -----  Patient needs close hospital follow-up with anyone in clinic

## 2022-11-07 ENCOUNTER — PATIENT OUTREACH (OUTPATIENT)
Dept: ADMINISTRATIVE | Facility: CLINIC | Age: 47
End: 2022-11-07
Payer: MEDICARE

## 2022-11-07 NOTE — PROGRESS NOTES
C3 nurse spoke with Irvin Diaz Jr. for a TCC post hospital discharge follow up call. The patient has a scheduled HOSFU appointment with Hossein De La O on 11/08/22 @ 7988.

## 2022-11-08 ENCOUNTER — OFFICE VISIT (OUTPATIENT)
Dept: PSYCHIATRY | Facility: CLINIC | Age: 47
End: 2022-11-08
Payer: MEDICARE

## 2022-11-08 ENCOUNTER — TELEPHONE (OUTPATIENT)
Dept: PRIMARY CARE CLINIC | Facility: CLINIC | Age: 47
End: 2022-11-08

## 2022-11-08 ENCOUNTER — OFFICE VISIT (OUTPATIENT)
Dept: PRIMARY CARE CLINIC | Facility: CLINIC | Age: 47
End: 2022-11-08
Payer: MEDICARE

## 2022-11-08 VITALS
HEIGHT: 72 IN | DIASTOLIC BLOOD PRESSURE: 58 MMHG | BODY MASS INDEX: 26.95 KG/M2 | WEIGHT: 199 LBS | HEART RATE: 76 BPM | OXYGEN SATURATION: 96 % | RESPIRATION RATE: 18 BRPM | SYSTOLIC BLOOD PRESSURE: 102 MMHG

## 2022-11-08 DIAGNOSIS — K70.30 ALCOHOLIC CIRRHOSIS OF LIVER WITHOUT ASCITES: Primary | ICD-10-CM

## 2022-11-08 DIAGNOSIS — I85.10 SECONDARY ESOPHAGEAL VARICES WITHOUT BLEEDING: ICD-10-CM

## 2022-11-08 DIAGNOSIS — I10 ESSENTIAL HYPERTENSION: Chronic | ICD-10-CM

## 2022-11-08 DIAGNOSIS — N18.30 STAGE 3 CHRONIC KIDNEY DISEASE, UNSPECIFIED WHETHER STAGE 3A OR 3B CKD: ICD-10-CM

## 2022-11-08 DIAGNOSIS — L30.9 ECZEMA, UNSPECIFIED TYPE: ICD-10-CM

## 2022-11-08 DIAGNOSIS — F10.10 ALCOHOL ABUSE: ICD-10-CM

## 2022-11-08 DIAGNOSIS — D69.6 THROMBOCYTOPENIA: ICD-10-CM

## 2022-11-08 DIAGNOSIS — K76.82 HEPATIC ENCEPHALOPATHY: ICD-10-CM

## 2022-11-08 DIAGNOSIS — F10.21 ALCOHOL USE DISORDER, SEVERE, IN EARLY REMISSION: Primary | ICD-10-CM

## 2022-11-08 PROCEDURE — 99214 OFFICE O/P EST MOD 30 MIN: CPT | Mod: PBBFAC,PN | Performed by: INTERNAL MEDICINE

## 2022-11-08 PROCEDURE — 90853 PR GROUP PSYCHOTHERAPY: ICD-10-PCS | Mod: ,,, | Performed by: PSYCHOLOGIST

## 2022-11-08 PROCEDURE — 99999 PR PBB SHADOW E&M-EST. PATIENT-LVL IV: ICD-10-PCS | Mod: PBBFAC,,, | Performed by: INTERNAL MEDICINE

## 2022-11-08 PROCEDURE — 99214 OFFICE O/P EST MOD 30 MIN: CPT | Mod: S$PBB,,, | Performed by: INTERNAL MEDICINE

## 2022-11-08 PROCEDURE — 99999 PR PBB SHADOW E&M-EST. PATIENT-LVL IV: CPT | Mod: PBBFAC,,, | Performed by: INTERNAL MEDICINE

## 2022-11-08 PROCEDURE — 99214 PR OFFICE/OUTPT VISIT, EST, LEVL IV, 30-39 MIN: ICD-10-PCS | Mod: S$PBB,,, | Performed by: INTERNAL MEDICINE

## 2022-11-08 PROCEDURE — 90853 GROUP PSYCHOTHERAPY: CPT | Mod: ,,, | Performed by: PSYCHOLOGIST

## 2022-11-08 RX ORDER — BETAMETHASONE DIPROPIONATE 0.5 MG/G
CREAM TOPICAL 2 TIMES DAILY
Qty: 50 G | Refills: 2 | Status: SHIPPED | OUTPATIENT
Start: 2022-11-08 | End: 2023-01-22 | Stop reason: SDUPTHER

## 2022-11-08 NOTE — PROGRESS NOTES
[facility] Psych[facility]    [facility]    [facility] status of patient: Outpatient    11/8/2022    Length of service:[id number]    Referred by: [facility]     Number of patients in attendance: 3    Target symptoms: alcohol abuse    Patient's response to intervention:  The patient's response to intervention is active listening.    Progress toward goals and other mental status changes:  The patient's progress toward goals is fair .    Interval history: Pt reports that he remains sober. He arrived on time today and introduced himself to the group.    Diagnosis: Alcohol Use Disorder, in early remission    Plan: group psychotherapy    Return to clinic: 1 week

## 2022-11-09 ENCOUNTER — TELEPHONE (OUTPATIENT)
Dept: HEPATOLOGY | Facility: CLINIC | Age: 47
End: 2022-11-09
Payer: MEDICARE

## 2022-11-09 NOTE — PROGRESS NOTES
Subjective:       Patient ID: Irvin Diaz Jr. is a 47 y.o. male.    Chief Complaint: Hospital Follow Up, Hip Pain (Broke his hip a while back - left side), Rash (Rash on the right leg - has some itching ), and Cirrhosis (Alcoholic cirrhosis )    HPI  Pt visit today for f/u from hospital pt ha sh/o liver cirrhoes from etoh abuse has been sober x 2 months admit for hapetic encephalopathy on lactulose low protein diet and xifaxan pt is current stable awake alert oriented having chronic left hip pain avascular necrosese not able to replace hi to medically not stable for surgery high risk he also has anemia and CRI u/s kidney nomal U/A normal he had EGD postive esophageal varices no ascites pt is being consider for liver transplant but has to be sober from alcohol pt is  has kids and grandkid pt ha sappt for labs and then hepatology f/u next week pt under stand about lwo protein diet and cw medication and absolute no etoh  Review of Systems    Objective:      Physical Exam  Vitals and nursing note reviewed.   Constitutional:       General: He is not in acute distress.     Appearance: He is well-developed.   HENT:      Head: Normocephalic and atraumatic.      Right Ear: External ear normal.      Left Ear: External ear normal.      Nose: Nose normal.      Mouth/Throat:      Pharynx: No oropharyngeal exudate.   Eyes:      Extraocular Movements: Extraocular movements intact.      Conjunctiva/sclera: Conjunctivae normal.      Pupils: Pupils are equal, round, and reactive to light.   Neck:      Thyroid: No thyromegaly.   Cardiovascular:      Rate and Rhythm: Normal rate and regular rhythm.      Heart sounds: Normal heart sounds. No murmur heard.    No friction rub. No gallop.   Pulmonary:      Effort: Pulmonary effort is normal. No respiratory distress.      Breath sounds: Normal breath sounds. No wheezing.   Abdominal:      General: Bowel sounds are normal. There is no distension.      Palpations: Abdomen is  soft.      Tenderness: There is no abdominal tenderness.   Musculoskeletal:         General: No tenderness or deformity. Normal range of motion.      Cervical back: Normal range of motion and neck supple.   Lymphadenopathy:      Cervical: No cervical adenopathy.   Skin:     General: Skin is warm and dry.      Findings: No erythema or rash.      Comments: Large area lateral distal rt leg with hyperpigmented scaly pruritic skin rash   Neurological:      Mental Status: He is alert and oriented to person, place, and time.      Comments: Wheel chair due to pain in left hip from avascular necroses in pain management   Psychiatric:         Mood and Affect: Mood normal.         Thought Content: Thought content normal.         Judgment: Judgment normal.       Assessment:       1. Alcoholic cirrhosis of liver without ascites    2. Eczema, unspecified type    3. Alcohol abuse    4. Essential hypertension    5. Stage 3 chronic kidney disease, unspecified whether stage 3a or 3b CKD    6. Thrombocytopenia    7. Secondary esophageal varices without bleeding    8. Hepatic encephalopathy          Plan:       Alcoholic cirrhosis of liver without ascites    Eczema, unspecified type  -     augmented betamethasone dipropionate (DIPROLENE-AF) 0.05 % cream; Apply topically 2 (two) times daily.  Dispense: 50 g; Refill: 2    Alcohol abuse    Essential hypertension    Stage 3 chronic kidney disease, unspecified whether stage 3a or 3b CKD    Thrombocytopenia    Secondary esophageal varices without bleeding    Hepatic encephalopathy  Comments:  continue with lactulose xifaxan low protein diet avid acetaminophen raw seafood etoh      Medication List with Changes/Refills   New Medications    AUGMENTED BETAMETHASONE DIPROPIONATE (DIPROLENE-AF) 0.05 % CREAM    Apply topically 2 (two) times daily.   Current Medications    ACAMPROSATE (CAMPRAL) 333 MG TABLET    Take 2 tablets (666 mg total) by mouth 3 (three) times daily.    AMLODIPINE (NORVASC) 5  MG TABLET    Take 1 tablet (5 mg total) by mouth once daily.    CARVEDILOL (COREG) 12.5 MG TABLET    Take 1 tablet (12.5 mg total) by mouth 2 (two) times daily with meals.    CLINDAMYCIN-BENZOYL PEROXIDE (BENZACLIN) GEL    Apply topically 2 (two) times daily.    FAMOTIDINE (PEPCID) 20 MG TABLET    1 tablet at bedtime as needed    FERROUS GLUCONATE (FERGON) 240 (27 FE) MG TABLET    Take 2 tablets (480 mg total) by mouth 2 (two) times daily with meals.    FOLIC ACID (FOLVITE) 1 MG TABLET    Take 1 tablet (1 mg total) by mouth once daily.    LACTULOSE (CHRONULAC) 10 GRAM/15 ML SOLUTION    Take 30 mLs (20 g total) by mouth 3 (three) times daily. TITRATE TO 3-4 BOWEL MOVEMENTS DAILY.    NORTRIPTYLINE (PAMELOR) 25 MG CAPSULE    Take 1 capsule (25 mg total) by mouth every evening.    OXYCODONE (ROXICODONE) 5 MG IMMEDIATE RELEASE TABLET    Take 1 tablet (5 mg total) by mouth 2 (two) times daily as needed for Pain.    OXYCODONE (ROXICODONE) 5 MG IMMEDIATE RELEASE TABLET    Take 1 tablet (5 mg total) by mouth 2 (two) times daily as needed for Pain.    OXYCODONE (ROXICODONE) 5 MG IMMEDIATE RELEASE TABLET    Take 1 tablet (5 mg total) by mouth 2 (two) times daily as needed for Pain.    PANTOPRAZOLE (PROTONIX) 40 MG TABLET    Take 1 tablet (40 mg total) by mouth 2 (two) times daily.    RIFAXIMIN (XIFAXAN) 550 MG TAB    Take 1 tablet (550 mg total) by mouth 2 (two) times daily.    SILDENAFIL (VIAGRA) 100 MG TABLET    Take 1 tablet (100 mg total) by mouth daily as needed for Erectile Dysfunction.    THIAMINE 100 MG TABLET    Take 1 tablet (100 mg total) by mouth once daily.   Discontinued Medications    ERGOCALCIFEROL (ERGOCALCIFEROL) 50,000 UNIT CAP    1 tablet    TRAMADOL (ULTRAM) 50 MG TABLET    Take 50 mg by mouth every 12 (twelve) hours as needed for Pain.

## 2022-11-14 ENCOUNTER — TELEPHONE (OUTPATIENT)
Dept: PAIN MEDICINE | Facility: CLINIC | Age: 47
End: 2022-11-14
Payer: MEDICARE

## 2022-11-14 NOTE — TELEPHONE ENCOUNTER
Patient requesting medication refill, informed that provider has already sent in refill for medication and to call pharmacy for  time. Patient verbalized understanding.

## 2022-11-14 NOTE — TELEPHONE ENCOUNTER
----- Message from Francesca Orozco sent at 11/14/2022  3:34 PM CST -----  Regarding: Refill  Contact: IRVIN LUX JR. [[id number]]  Type:  RX Refill Request    Who Called: Irvin Lux Jr.    Refill or New Rx:Refill   RX Name and Strength:oxyCODONE (ROXICODONE) 5 MG   How is the patient currently taking it? (ex. 1XDay):2 x day   Is this a 30 day or 90 day RX:60 day   Preferred Pharmacy with phone number:[facility]   Local or Mail Order:Local   Ordering Provider:Dr Robertson   Would the patient rather a call back or a response via [facility]? Call back   Best Call Back Number:[phone]  Additional Information:

## 2022-11-25 ENCOUNTER — NURSE TRIAGE (OUTPATIENT)
Dept: ADMINISTRATIVE | Facility: CLINIC | Age: 47
End: 2022-11-25
Payer: MEDICARE

## 2022-11-25 ENCOUNTER — HOSPITAL ENCOUNTER (EMERGENCY)
Facility: HOSPITAL | Age: 47
Discharge: HOME | End: 2022-11-25
Attending: EMERGENCY MEDICINE
Payer: MEDICARE

## 2022-11-25 VITALS
DIASTOLIC BLOOD PRESSURE: 78 MMHG | RESPIRATION RATE: 15 BRPM | HEART RATE: 70 BPM | SYSTOLIC BLOOD PRESSURE: 132 MMHG | TEMPERATURE: 99 F | OXYGEN SATURATION: 98 %

## 2022-11-25 DIAGNOSIS — M79.89 LEG SWELLING: Primary | ICD-10-CM

## 2022-11-25 PROCEDURE — 99284 PR EMERGENCY DEPT VISIT,LEVEL IV: ICD-10-PCS | Mod: ,,, | Performed by: PHYSICIAN ASSISTANT

## 2022-11-25 PROCEDURE — 99284 EMERGENCY DEPT VISIT MOD MDM: CPT | Mod: 25

## 2022-11-25 PROCEDURE — 99284 EMERGENCY DEPT VISIT MOD MDM: CPT | Mod: ,,, | Performed by: PHYSICIAN ASSISTANT

## 2022-11-25 PROCEDURE — 99284 EMERGENCY DEPT VISIT MOD MDM: CPT | Mod: 25,27

## 2022-11-25 NOTE — TELEPHONE ENCOUNTER
O[facility] RN   Liver Transplant Candidate     Patient calling in reports that both   Ankles and feet are swollen.    Dr. De La O PCP.    Neuro, in tact Warm pink.    Right side more swollen then left. no liver disease TXP protocol used.  No SOB,     Care advise is to go to ED.  Patient hung up phone and could not complete triage protocol.    Reason for Disposition   Thigh, calf, or ankle swelling in both legs, but one side is definitely more swollen (Exception: longstanding difference between legs)    Additional Information   Negative: Sounds like a life-threatening emergency to the triager   Negative: Difficulty breathing at rest   Negative: Entire foot is cool or blue in comparison to other side   Negative: SEVERE swelling (e.g., swelling extends above knee, entire leg is swollen, weeping fluid)   Negative: Thigh or calf pain and only 1 side and present > 1 hour   Negative: Thigh, calf, or ankle swelling in only one leg    Protocols used: Leg Swelling and Edema-A-OH

## 2022-11-25 NOTE — TELEPHONE ENCOUNTER
Tried calling the Pt to see if she would like to schedule an appointment and advise her to go to the ER. No answer.

## 2022-11-26 NOTE — ED TRIAGE NOTES
"Irvin Diaz Jr., a 47 y.o. male presents to the ED w/ complaint of extremity swelling. Pt reports he has a bad liver and noticed swelling to hands/feet yesterday. Reports that he has a "low platelet count". Hx of blood clots and liver cirrohosis, -blood thinners.    Adult Physical Assessment  LOC: Irvin Diaz Jr., 47 y.o. male verified via two identifiers.  The patient is awake, alert, oriented and speaking appropriately at this time.  APPEARANCE: Patient resting comfortably and appears to be in no acute distress at this time. Patient is clean and well groomed, patient's clothing is properly fastened.  SKIN:The skin is warm and dry, color consistent with ethnicity, patient has normal skin turgor and moist mucus membranes, skin intact, no breakdown or brusing noted.  MUSCULOSKELETAL: Patient moving all extremities well, Patient reports swelling in bilateral ankles and feet. Right leg more swollen than left. Ambulates with a cane.  RESPIRATORY: Airway is open and patent, respirations are spontaneous, patient has a normal effort and rate, no accessory muscle use noted.  CARDIAC: Patient has a normal rate and rhythm, no periphreal edema noted in any extremity, capillary refill < 3 seconds in all extremities  ABDOMEN: Soft and non tender to palpation, no abdominal distention noted. Bowel sounds present in all four quadrants.  NEUROLOGIC: Eyes open spontaneously, behavior appropriate to situation, follows commands, facial expression symmetrical, bilateral hand grasp equal and even, purposeful motor response noted, normal sensation in all extremities when touched with a finger.      Triage note:  Chief Complaint   Patient presents with    Extremity Swelling     Reports he has a bad liver and noticed swelling to hands/feet yesterday. Reports he was also told that he has low plt count     Review of patient's allergies indicates:   Allergen Reactions    Ciprofloxacin hcl Hallucinations    Meperidine Hives     Pt " medicated w/multiple medications (demerol, protonix, and zofran)  w/i 10 mins time frame prior to developing localized hives near IV site that med was administered. Pt reports he has/takes all other medications on daily basis.     Morphine Itching    Nsaids (non-steroidal anti-inflammatory drug)      Kidney Disease    Tylenol [acetaminophen]      Hx of liver disease     Past Medical History:   Diagnosis Date    Alcoholic cirrhosis of liver     Arthritis     ATN (acute tubular necrosis)     CHF (congestive heart failure)     Diverticulitis 01/2020    Esophageal varices     GERD (gastroesophageal reflux disease)     GI bleed     Hip arthritis     Left    Hypertension     Liver cirrhosis     Macrocytic anemia     Pulmonary embolism 08/2018    Unprovoked DVT.  Stop Coumadin due to GIB    Thrombocytopenia

## 2022-11-26 NOTE — PROVIDER PROGRESS NOTES - EMERGENCY DEPT.
Encounter Date: 11/25/2022    ED Physician Progress Notes        Physician Note:   I have assumed care for this patient from JOHNNA Nova and HARRIETT Jacobs.  I have discussed care with the previous attending.   48 y/o M with history of alcoholic liver cirrhosis and prior unprovoked DVT (no longer on coumadin due to GIB) presents with leg swelling and right leg pain  At the time of sign out ultrasound of right LE pending    11:15 PM   US Rt LE negative for DVT  Discharged home. Routine return precautions. Follow up PCP

## 2022-11-26 NOTE — ED PROVIDER NOTES
Encounter Date: 11/25/2022       History     Chief Complaint   Patient presents with    Extremity Swelling     Reports he has a bad liver and noticed swelling to hands/feet yesterday. Reports he was also told that he has low plt count     47-year-old male with alcoholic liver cirrhosis, history of unprovoked DVT (Coumadin was discontinued due to GI bleed) presents to the ED with leg swelling.  Patient noticed swelling in his ankles and feet yesterday.  Patient does not typically have leg swelling.  He denies shortness of breath, chest pain.  Denies any calf pain.  Not currently on anticoagulation.  Had basic labs today.  Patient states that he called the on-call nursing triage and was directed to the ER for his leg swelling.     Review of patient's allergies indicates:   Allergen Reactions    Ciprofloxacin hcl Hallucinations    Meperidine Hives     Pt medicated w/multiple medications (demerol, protonix, and zofran)  w/i 10 mins time frame prior to developing localized hives near IV site that med was administered. Pt reports he has/takes all other medications on daily basis.     Morphine Itching    Nsaids (non-steroidal anti-inflammatory drug)      Kidney Disease    Tylenol [acetaminophen]      Hx of liver disease     Past Medical History:   Diagnosis Date    Alcoholic cirrhosis of liver     Arthritis     ATN (acute tubular necrosis)     CHF (congestive heart failure)     Diverticulitis 01/2020    Esophageal varices     GERD (gastroesophageal reflux disease)     GI bleed     Hip arthritis     Left    Hypertension     Liver cirrhosis     Macrocytic anemia     Pulmonary embolism 08/2018    Unprovoked DVT.  Stop Coumadin due to GIB    Thrombocytopenia      Past Surgical History:   Procedure Laterality Date    ANKLE SURGERY      BLOCK, NERVE, PERIPHERAL Left 06/24/2022    Procedure: Left Hip femoral-obturator accessory nerve block;  Surgeon: Robbin De La Garza DO;  Location: [facility];  Service: Pain Management;   Laterality: Left;    COLON SURGERY  2007    COLONOSCOPY  09/05/2019    [facility]    COLONOSCOPY N/A 02/17/2021    Procedure: COLONOSCOPY;  Surgeon: Renea Billingsley MD;  Location: [facility] [place] [place];  Service: Endoscopy;  Laterality: N/A;    COLONOSCOPY W/ BIOPSIES  02/17/2021    ESOPHAGOGASTRODUODENOSCOPY N/A 07/26/2019    Procedure: EGD (ESOPHAGOGASTRODUODENOSCOPY);  Surgeon: Brian Trivedi MD;  Location: [facility];  Service: Endoscopy;  Laterality: N/A;    ESOPHAGOGASTRODUODENOSCOPY N/A 04/08/2020    Procedure: EGD (ESOPHAGOGASTRODUODENOSCOPY);  Surgeon: Donn Acuna MD;  Location: [facility];  Service: Endoscopy;  Laterality: N/A;    ESOPHAGOGASTRODUODENOSCOPY N/A 01/03/2021    Procedure: EGD (ESOPHAGOGASTRODUODENOSCOPY)- coffee ground emesis, hx varices;  Surgeon: Steve Chairez MD;  Location: [facility];  Service: Endoscopy;  Laterality: N/A;    ESOPHAGOGASTRODUODENOSCOPY N/A 05/03/2021    Procedure: EGD (ESOPHAGOGASTRODUODENOSCOPY);  Surgeon: Jeamnarie Ngo MD;  Location: [facility] [place] [place];  Service: Endoscopy;  Laterality: N/A;    ESOPHAGOGASTRODUODENOSCOPY N/A 07/19/2021    Procedure: ESOPHAGOGASTRODUODENOSCOPY (EGD);  Surgeon: Claudio Medeiros MD;  Location: [facility];  Service: Endoscopy;  Laterality: N/A;    ESOPHAGOGASTRODUODENOSCOPY  12/20/2021    ESOPHAGOGASTRODUODENOSCOPY N/A 12/20/2021    Procedure: EGD (ESOPHAGOGASTRODUODENOSCOPY);  Surgeon: Ajay Jackson MD;  Location: [facility];  Service: Endoscopy;  Laterality: N/A;    ESOPHAGOGASTRODUODENOSCOPY N/A 05/03/2022    Procedure: EGD (ESOPHAGOGASTRODUODENOSCOPY);  Surgeon: Brian Trivedi MD;  Location: [facility];  Service: Endoscopy;  Laterality: N/A;    ESOPHAGOGASTRODUODENOSCOPY N/A 7/7/2022    Procedure: EGD (ESOPHAGOGASTRODUODENOSCOPY);  Surgeon: Ajay Jackson MD;  Location: [facility];  Service: Endoscopy;  Laterality: N/A;    HERNIA REPAIR Left     Inguinal    UPPER GASTROINTESTINAL ENDOSCOPY N/A 07/07/2022     Family History   Problem  Relation Age of Onset    Hypertension Mother     Breast cancer Mother     Hypertension Father     Prostate cancer Father     Bladder Cancer Father      Social History     Tobacco Use    Smoking status: Former     Types: Cigarettes     Quit date: 2000     Years since quittin.9    Smokeless tobacco: Never    Tobacco comments:     quit 20 years ago   Substance Use Topics    Alcohol use: Not Currently     Comment: freq    Drug use: Yes     Types: Marijuana     Comment: occasionally     Review of Systems   Constitutional:  Negative for fever.   HENT:  Negative for sore throat.    Respiratory:  Negative for shortness of breath.    Cardiovascular:  Positive for leg swelling. Negative for chest pain.   Gastrointestinal:  Negative for nausea.   Genitourinary:  Negative for dysuria.   Musculoskeletal:  Negative for back pain.   Skin:  Negative for rash.   Neurological:  Negative for weakness.   Hematological:  Does not bruise/bleed easily.     Physical Exam     Initial Vitals [22 1905]   BP Pulse Resp Temp SpO2   (!) 140/71 81 18 98 °F (36.7 °C) 98 %      MAP       --         Physical Exam    Nursing note and vitals reviewed.  Constitutional: He appears well-developed and well-nourished.  Non-toxic appearance. He does not appear ill. No distress.   HENT:   Head: Normocephalic and atraumatic.   Neck: Neck supple.   Normal range of motion.  Cardiovascular:  Normal rate and regular rhythm.     Exam reveals no gallop, no distant heart sounds and no friction rub.       No murmur heard.  Pulmonary/Chest: Effort normal and breath sounds normal. No accessory muscle usage. No tachypnea. No respiratory distress. He has no decreased breath sounds. He has no wheezes. He has no rhonchi. He has no rales.   Abdominal: He exhibits no distension.   Musculoskeletal:      Cervical back: Normal range of motion and neck supple.      Comments: Trace pitting edema to BLE.  The right leg does appear more swollen than the left leg.  No  significant tenderness to the calf.  No skin color change.      Neurological: He is alert.   Skin: No rash noted.       ED Course   Procedures  Labs Reviewed - No data to display       Imaging Results              US Lower Extremity Veins Right (Final result)  Result time 11/25/22 23:07:28      Final result by Alfredo Banerjee MD (11/25/22 23:07:28)                   Impression:      No evidence of deep venous thrombosis in the right lower extremity.    Electronically signed by resident: Ramses Weathers  Date:    11/25/2022  Time:    23:02    Electronically signed by: Alfredo Banerjee  Date:    11/25/2022  Time:    23:07               Narrative:    EXAMINATION:  US LOWER EXTREMITY VEINS RIGHT    CLINICAL HISTORY:  Other specified soft tissue disorders    TECHNIQUE:  Duplex and color flow Doppler evaluation and graded compression of the right lower extremity veins was performed.    COMPARISON:  Ultrasound lower extremity veins bilateral 11/19/2020, 10/26/2020, 05/11/2019    FINDINGS:  Right thigh veins: The common femoral, femoral, popliteal, upper greater saphenous, and deep femoral veins are patent and free of thrombus. The veins are normally compressible and have normal phasic flow and augmentation response.    Right calf veins: The visualized calf veins are patent.    Contralateral CFV: The contralateral (left) common femoral vein is patent and free of thrombus.    Miscellaneous: None                                       Medications - No data to display  Medical Decision Making:   History:   Old Medical Records: I decided to obtain old medical records.  Initial Assessment:   47-year-old male presents to the ED with leg swelling since last night. Directed to the ED by on-call nursing triage.  Hemodynamically stable.  See physical exam above.  Differential Diagnosis:   My differential diagnosis includes but is not limited to:  DVT, hypervolemia, hypoalbuminemia, renal insufficiency, venous insufficiency,  dependent  Clinical Tests:   Radiological Study: Ordered  ED Management:  Patient had basic labs earlier today prior to ED arrival.  Liver function is stable.  Patient has no complaints of chest pain or shortness of breath to suggest decompensated heart failure.  Most recent echo was overall unremarkable.  Do not feel that addition emergent lab studies are indicated at this time.  Given unilateral leg swelling, will obtain an ultrasound to assess for DVT.  I have reviewed the patient's records and discussed this case with my supervising physician.                  ED Course as of 11/26/22 1124   Fri Nov 25, 2022 2205 Ultrasound pending at shift end.  Patient signed out to oncoming ED team pending imaging results and final disposition. [EH]      ED Course User Index  [EH] Gloria Jacobs PA-C                 Clinical Impression:   Final diagnoses:  [M79.89] Leg swelling (Primary)        ED Disposition Condition    Discharge Stable          ED Prescriptions    None       Follow-up Information       Follow up With Specialties Details Why Contact Info    Edouard Gibson MD Family Medicine, Hospitalist Schedule an appointment as soon as possible for a visit   [address] [place] [address]  [phone]               Gloria Jacobs PA-C  11/26/22 1125

## 2022-11-26 NOTE — DISCHARGE INSTRUCTIONS
Follow up with your doctor.  Return to ED for worsening swelling, chest pain, shortness of breath or any other concerns

## 2022-11-28 ENCOUNTER — HOSPITAL ENCOUNTER (OUTPATIENT)
Facility: HOSPITAL | Age: 47
Discharge: HOME | End: 2022-11-30
Attending: EMERGENCY MEDICINE | Admitting: STUDENT IN AN ORGANIZED HEALTH CARE EDUCATION/TRAINING PROGRAM
Payer: MEDICARE

## 2022-11-28 DIAGNOSIS — I85.10 SECONDARY ESOPHAGEAL VARICES WITHOUT BLEEDING: ICD-10-CM

## 2022-11-28 DIAGNOSIS — K70.31 ALCOHOLIC CIRRHOSIS OF LIVER WITH ASCITES: ICD-10-CM

## 2022-11-28 DIAGNOSIS — K92.1 MELENA: Primary | ICD-10-CM

## 2022-11-28 DIAGNOSIS — R10.13 EPIGASTRIC ABDOMINAL PAIN: ICD-10-CM

## 2022-11-28 DIAGNOSIS — K76.82 HEPATIC ENCEPHALOPATHY: ICD-10-CM

## 2022-11-28 DIAGNOSIS — R11.0 NAUSEA: ICD-10-CM

## 2022-11-28 DIAGNOSIS — R07.9 CHEST PAIN: ICD-10-CM

## 2022-11-28 DIAGNOSIS — R41.0 CONFUSION: ICD-10-CM

## 2022-11-28 LAB
ALBUMIN SERPL BCP-MCNC: 3.2 G/DL (ref 3.5–5.2)
ALP SERPL-CCNC: 166 U/L (ref 55–135)
ALT SERPL W/O P-5'-P-CCNC: 27 U/L (ref 10–44)
AMMONIA PLAS-SCNC: 31 UMOL/L (ref 10–50)
ANION GAP SERPL CALC-SCNC: 12 MMOL/L (ref 8–16)
AST SERPL-CCNC: 57 U/L (ref 10–40)
BILIRUB SERPL-MCNC: 5 MG/DL (ref 0.1–1)
BUN SERPL-MCNC: 17 MG/DL (ref 6–20)
CALCIUM SERPL-MCNC: 9.3 MG/DL (ref 8.7–10.5)
CHLORIDE SERPL-SCNC: 105 MMOL/L (ref 95–110)
CO2 SERPL-SCNC: 21 MMOL/L (ref 23–29)
CREAT SERPL-MCNC: 1.9 MG/DL (ref 0.5–1.4)
ERYTHROCYTE [DISTWIDTH] IN BLOOD BY AUTOMATED COUNT: 16.1 % (ref 11.5–14.5)
EST. GFR  (NO RACE VARIABLE): 43 ML/MIN/1.73 M^2
GLUCOSE SERPL-MCNC: 120 MG/DL (ref 70–110)
HCT VFR BLD AUTO: 34.9 % (ref 40–54)
HGB BLD-MCNC: 11.3 G/DL (ref 14–18)
INR PPP: 1.4 (ref 0.8–1.2)
LACTATE SERPL-SCNC: 1.7 MMOL/L (ref 0.5–2.2)
LIPASE SERPL-CCNC: 65 U/L (ref 4–60)
MCH RBC QN AUTO: 33.2 PG (ref 27–31)
MCHC RBC AUTO-ENTMCNC: 32.4 G/DL (ref 32–36)
MCV RBC AUTO: 103 FL (ref 82–98)
PLATELET # BLD AUTO: 103 K/UL (ref 150–450)
PMV BLD AUTO: 10.9 FL (ref 9.2–12.9)
POTASSIUM SERPL-SCNC: 3.7 MMOL/L (ref 3.5–5.1)
PROT SERPL-MCNC: 9.4 G/DL (ref 6–8.4)
PROTHROMBIN TIME: 14 SEC (ref 9–12.5)
RBC # BLD AUTO: 3.4 M/UL (ref 4.6–6.2)
SODIUM SERPL-SCNC: 138 MMOL/L (ref 136–145)
WBC # BLD AUTO: 7.13 K/UL (ref 3.9–12.7)

## 2022-11-28 PROCEDURE — 63600175 PHARM REV CODE 636 W HCPCS: Performed by: NURSE PRACTITIONER

## 2022-11-28 PROCEDURE — G0378 HOSPITAL OBSERVATION PER HR: HCPCS

## 2022-11-28 PROCEDURE — C9113 INJ PANTOPRAZOLE SODIUM, VIA: HCPCS | Performed by: EMERGENCY MEDICINE

## 2022-11-28 PROCEDURE — 99219 PR INITIAL OBSERVATION CARE,LEVL II: CPT | Mod: ,,, | Performed by: STUDENT IN AN ORGANIZED HEALTH CARE EDUCATION/TRAINING PROGRAM

## 2022-11-28 PROCEDURE — 99285 EMERGENCY DEPT VISIT HI MDM: CPT | Mod: ,,, | Performed by: EMERGENCY MEDICINE

## 2022-11-28 PROCEDURE — 96365 THER/PROPH/DIAG IV INF INIT: CPT

## 2022-11-28 PROCEDURE — 25000003 PHARM REV CODE 250: Performed by: EMERGENCY MEDICINE

## 2022-11-28 PROCEDURE — 99285 PR EMERGENCY DEPT VISIT,LEVEL V: ICD-10-PCS | Mod: ,,, | Performed by: EMERGENCY MEDICINE

## 2022-11-28 PROCEDURE — 25000003 PHARM REV CODE 250: Performed by: STUDENT IN AN ORGANIZED HEALTH CARE EDUCATION/TRAINING PROGRAM

## 2022-11-28 PROCEDURE — 85027 COMPLETE CBC AUTOMATED: CPT | Performed by: EMERGENCY MEDICINE

## 2022-11-28 PROCEDURE — 99214 OFFICE O/P EST MOD 30 MIN: CPT | Mod: GC,,, | Performed by: INTERNAL MEDICINE

## 2022-11-28 PROCEDURE — 82272 OCCULT BLD FECES 1-3 TESTS: CPT

## 2022-11-28 PROCEDURE — 63600175 PHARM REV CODE 636 W HCPCS: Performed by: EMERGENCY MEDICINE

## 2022-11-28 PROCEDURE — 80321 ALCOHOLS BIOMARKERS 1OR 2: CPT | Performed by: STUDENT IN AN ORGANIZED HEALTH CARE EDUCATION/TRAINING PROGRAM

## 2022-11-28 PROCEDURE — 83690 ASSAY OF LIPASE: CPT | Performed by: EMERGENCY MEDICINE

## 2022-11-28 PROCEDURE — 96376 TX/PRO/DX INJ SAME DRUG ADON: CPT

## 2022-11-28 PROCEDURE — 96375 TX/PRO/DX INJ NEW DRUG ADDON: CPT

## 2022-11-28 PROCEDURE — 85610 PROTHROMBIN TIME: CPT | Performed by: STUDENT IN AN ORGANIZED HEALTH CARE EDUCATION/TRAINING PROGRAM

## 2022-11-28 PROCEDURE — C9113 INJ PANTOPRAZOLE SODIUM, VIA: HCPCS | Performed by: STUDENT IN AN ORGANIZED HEALTH CARE EDUCATION/TRAINING PROGRAM

## 2022-11-28 PROCEDURE — 80053 COMPREHEN METABOLIC PANEL: CPT | Performed by: EMERGENCY MEDICINE

## 2022-11-28 PROCEDURE — 83605 ASSAY OF LACTIC ACID: CPT | Performed by: EMERGENCY MEDICINE

## 2022-11-28 PROCEDURE — 99219 PR INITIAL OBSERVATION CARE,LEVL II: ICD-10-PCS | Mod: ,,, | Performed by: STUDENT IN AN ORGANIZED HEALTH CARE EDUCATION/TRAINING PROGRAM

## 2022-11-28 PROCEDURE — 99214 PR OFFICE/OUTPT VISIT, EST, LEVL IV, 30-39 MIN: ICD-10-PCS | Mod: GC,,, | Performed by: INTERNAL MEDICINE

## 2022-11-28 PROCEDURE — 99285 EMERGENCY DEPT VISIT HI MDM: CPT | Mod: 25

## 2022-11-28 PROCEDURE — 82140 ASSAY OF AMMONIA: CPT | Performed by: EMERGENCY MEDICINE

## 2022-11-28 PROCEDURE — 63600175 PHARM REV CODE 636 W HCPCS: Performed by: STUDENT IN AN ORGANIZED HEALTH CARE EDUCATION/TRAINING PROGRAM

## 2022-11-28 RX ORDER — NALOXONE HCL 0.4 MG/ML
0.02 VIAL (ML) INJECTION
Status: DISCONTINUED | OUTPATIENT
Start: 2022-11-28 | End: 2022-11-30 | Stop reason: HOSPADM

## 2022-11-28 RX ORDER — ACAMPROSATE CALCIUM 333 MG/1
666 TABLET, DELAYED RELEASE ORAL 3 TIMES DAILY
Status: DISCONTINUED | OUTPATIENT
Start: 2022-11-28 | End: 2022-11-30 | Stop reason: HOSPADM

## 2022-11-28 RX ORDER — CARVEDILOL 12.5 MG/1
12.5 TABLET ORAL 2 TIMES DAILY
Status: DISCONTINUED | OUTPATIENT
Start: 2022-11-28 | End: 2022-11-30 | Stop reason: HOSPADM

## 2022-11-28 RX ORDER — SODIUM CHLORIDE 0.9 % (FLUSH) 0.9 %
10 SYRINGE (ML) INJECTION EVERY 12 HOURS PRN
Status: DISCONTINUED | OUTPATIENT
Start: 2022-11-28 | End: 2022-11-30 | Stop reason: HOSPADM

## 2022-11-28 RX ORDER — THIAMINE HCL 100 MG
100 TABLET ORAL DAILY
Status: DISCONTINUED | OUTPATIENT
Start: 2022-11-29 | End: 2022-11-30 | Stop reason: HOSPADM

## 2022-11-28 RX ORDER — HYDROMORPHONE HYDROCHLORIDE 1 MG/ML
0.2 INJECTION, SOLUTION INTRAMUSCULAR; INTRAVENOUS; SUBCUTANEOUS ONCE
Status: COMPLETED | OUTPATIENT
Start: 2022-11-28 | End: 2022-11-28

## 2022-11-28 RX ORDER — TALC
6 POWDER (GRAM) TOPICAL NIGHTLY PRN
Status: DISCONTINUED | OUTPATIENT
Start: 2022-11-28 | End: 2022-11-30 | Stop reason: HOSPADM

## 2022-11-28 RX ORDER — GLUCAGON 1 MG
1 KIT INJECTION
Status: DISCONTINUED | OUTPATIENT
Start: 2022-11-28 | End: 2022-11-30 | Stop reason: HOSPADM

## 2022-11-28 RX ORDER — AMLODIPINE BESYLATE 5 MG/1
5 TABLET ORAL
Status: COMPLETED | OUTPATIENT
Start: 2022-11-28 | End: 2022-11-28

## 2022-11-28 RX ORDER — LACTULOSE 10 G/15ML
20 SOLUTION ORAL 3 TIMES DAILY
Status: DISCONTINUED | OUTPATIENT
Start: 2022-11-28 | End: 2022-11-30 | Stop reason: HOSPADM

## 2022-11-28 RX ORDER — AMLODIPINE BESYLATE 5 MG/1
5 TABLET ORAL DAILY
Status: DISCONTINUED | OUTPATIENT
Start: 2022-11-29 | End: 2022-11-30 | Stop reason: HOSPADM

## 2022-11-28 RX ORDER — HYDRALAZINE HYDROCHLORIDE 25 MG/1
25 TABLET, FILM COATED ORAL EVERY 8 HOURS PRN
Status: DISCONTINUED | OUTPATIENT
Start: 2022-11-28 | End: 2022-11-30 | Stop reason: HOSPADM

## 2022-11-28 RX ORDER — NORTRIPTYLINE HYDROCHLORIDE 25 MG/1
25 CAPSULE ORAL NIGHTLY
Status: DISCONTINUED | OUTPATIENT
Start: 2022-11-28 | End: 2022-11-30 | Stop reason: HOSPADM

## 2022-11-28 RX ORDER — ONDANSETRON 2 MG/ML
4 INJECTION INTRAMUSCULAR; INTRAVENOUS
Status: COMPLETED | OUTPATIENT
Start: 2022-11-28 | End: 2022-11-28

## 2022-11-28 RX ORDER — IBUPROFEN 200 MG
16 TABLET ORAL
Status: DISCONTINUED | OUTPATIENT
Start: 2022-11-28 | End: 2022-11-30 | Stop reason: HOSPADM

## 2022-11-28 RX ORDER — PANTOPRAZOLE SODIUM 40 MG/10ML
80 INJECTION, POWDER, LYOPHILIZED, FOR SOLUTION INTRAVENOUS
Status: COMPLETED | OUTPATIENT
Start: 2022-11-28 | End: 2022-11-28

## 2022-11-28 RX ORDER — OXYCODONE HYDROCHLORIDE 5 MG/1
5 TABLET ORAL EVERY 6 HOURS PRN
Status: DISCONTINUED | OUTPATIENT
Start: 2022-11-28 | End: 2022-11-30 | Stop reason: HOSPADM

## 2022-11-28 RX ORDER — FOLIC ACID 1 MG/1
1 TABLET ORAL DAILY
Status: DISCONTINUED | OUTPATIENT
Start: 2022-11-29 | End: 2022-11-30 | Stop reason: HOSPADM

## 2022-11-28 RX ORDER — PANTOPRAZOLE SODIUM 40 MG/10ML
40 INJECTION, POWDER, LYOPHILIZED, FOR SOLUTION INTRAVENOUS 2 TIMES DAILY
Status: DISCONTINUED | OUTPATIENT
Start: 2022-11-28 | End: 2022-11-30 | Stop reason: HOSPADM

## 2022-11-28 RX ORDER — IBUPROFEN 200 MG
24 TABLET ORAL
Status: DISCONTINUED | OUTPATIENT
Start: 2022-11-28 | End: 2022-11-30 | Stop reason: HOSPADM

## 2022-11-28 RX ORDER — OXYCODONE HYDROCHLORIDE 5 MG/1
5 TABLET ORAL EVERY 8 HOURS PRN
Status: DISCONTINUED | OUTPATIENT
Start: 2022-11-28 | End: 2022-11-28

## 2022-11-28 RX ORDER — MAG HYDROX/ALUMINUM HYD/SIMETH 200-200-20
30 SUSPENSION, ORAL (FINAL DOSE FORM) ORAL 4 TIMES DAILY PRN
Status: DISCONTINUED | OUTPATIENT
Start: 2022-11-28 | End: 2022-11-30 | Stop reason: HOSPADM

## 2022-11-28 RX ADMIN — CEFTRIAXONE 2 G: 2 INJECTION, SOLUTION INTRAVENOUS at 09:11

## 2022-11-28 RX ADMIN — LACTULOSE 20 G: 20 SOLUTION ORAL at 08:11

## 2022-11-28 RX ADMIN — CARVEDILOL 12.5 MG: 12.5 TABLET, FILM COATED ORAL at 08:11

## 2022-11-28 RX ADMIN — OXYCODONE 5 MG: 5 TABLET ORAL at 04:11

## 2022-11-28 RX ADMIN — NORTRIPTYLINE HYDROCHLORIDE 25 MG: 25 CAPSULE ORAL at 09:11

## 2022-11-28 RX ADMIN — PANTOPRAZOLE SODIUM 80 MG: 40 INJECTION, POWDER, FOR SOLUTION INTRAVENOUS at 11:11

## 2022-11-28 RX ADMIN — SODIUM CHLORIDE 250 ML: 0.9 INJECTION, SOLUTION INTRAVENOUS at 09:11

## 2022-11-28 RX ADMIN — RIFAXIMIN 550 MG: 550 TABLET ORAL at 08:11

## 2022-11-28 RX ADMIN — ACAMPROSATE CALCIUM 666 MG: 333 TABLET, DELAYED RELEASE ORAL at 09:11

## 2022-11-28 RX ADMIN — AMLODIPINE BESYLATE 5 MG: 5 TABLET ORAL at 01:11

## 2022-11-28 RX ADMIN — HYDROMORPHONE HYDROCHLORIDE 0.2 MG: 1 INJECTION, SOLUTION INTRAMUSCULAR; INTRAVENOUS; SUBCUTANEOUS at 09:11

## 2022-11-28 RX ADMIN — ONDANSETRON 4 MG: 2 INJECTION INTRAMUSCULAR; INTRAVENOUS at 01:11

## 2022-11-28 RX ADMIN — PANTOPRAZOLE SODIUM 40 MG: 40 INJECTION, POWDER, FOR SOLUTION INTRAVENOUS at 08:11

## 2022-11-28 NOTE — CONSULTS
[facility]  Gastroenterology  Consult Note    Patient Name: Irvin Diaz Jr.  MRN: [id number]  Admission Date: 11/28/2022  Hospital Length of Stay: 0 days  Code Status: Full Code   Attending Provider: Keesha Martin MD   Consulting Provider: Tan Braun MD  Primary Care Physician: Edouard Gibson MD  Principal Problem:<principal problem not specified>    Inpatient consult to Gastroenterology  Consult performed by: Tan Braun MD  Consult ordered by: Keesha Martin MD      Subjective:     HPI: Irvin Diaz Jr. is a 47 y.o. male with history of decompensated EtOH cirrhosis, HTN, JAY who presents with abd pain and melena. Onset a day prior to presentation, had multiple melenic Bms. Had new onset epigastric abdominal pain the day prior. Denies NSAID use. Denies hematemesis, did have nonbloody emesis. Does not take PPI at home. Last EGD 4 months ago showed small esophageal varices, portal hypertensive gastropathy, but no stigmata of bleeding. Does have history of variceal bleed two years ago. Hgb 11, improved from baseline. HDS.        Past Medical History:   Diagnosis Date    Alcoholic cirrhosis of liver     Arthritis     ATN (acute tubular necrosis)     CHF (congestive heart failure)     Diverticulitis 01/2020    Esophageal varices     GERD (gastroesophageal reflux disease)     GI bleed     Hip arthritis     Left    Hypertension     Liver cirrhosis     Macrocytic anemia     Pulmonary embolism 08/2018    Unprovoked DVT.  Stop Coumadin due to GIB    Thrombocytopenia        Past Surgical History:   Procedure Laterality Date    ANKLE SURGERY      BLOCK, NERVE, PERIPHERAL Left 06/24/2022    Procedure: Left Hip femoral-obturator accessory nerve block;  Surgeon: Robbin De La Garza DO;  Location: [facility];  Service: Pain Management;  Laterality: Left;    COLON SURGERY  2007    COLONOSCOPY  09/05/2019    [facility]    COLONOSCOPY N/A 02/17/2021    Procedure: COLONOSCOPY;  Surgeon:  Renea Billingsley MD;  Location: [facility];  Service: Endoscopy;  Laterality: N/A;    COLONOSCOPY W/ BIOPSIES  02/17/2021    ESOPHAGOGASTRODUODENOSCOPY N/A 07/26/2019    Procedure: EGD (ESOPHAGOGASTRODUODENOSCOPY);  Surgeon: Brian Trivedi MD;  Location: [facility];  Service: Endoscopy;  Laterality: N/A;    ESOPHAGOGASTRODUODENOSCOPY N/A 04/08/2020    Procedure: EGD (ESOPHAGOGASTRODUODENOSCOPY);  Surgeon: Donn Acuna MD;  Location: [facility];  Service: Endoscopy;  Laterality: N/A;    ESOPHAGOGASTRODUODENOSCOPY N/A 01/03/2021    Procedure: EGD (ESOPHAGOGASTRODUODENOSCOPY)- coffee ground emesis, hx varices;  Surgeon: Steve Chairez MD;  Location: [facility];  Service: Endoscopy;  Laterality: N/A;    ESOPHAGOGASTRODUODENOSCOPY N/A 05/03/2021    Procedure: EGD (ESOPHAGOGASTRODUODENOSCOPY);  Surgeon: Jeanmarie Ngo MD;  Location: [facility];  Service: Endoscopy;  Laterality: N/A;    ESOPHAGOGASTRODUODENOSCOPY N/A 07/19/2021    Procedure: ESOPHAGOGASTRODUODENOSCOPY (EGD);  Surgeon: Claudio Medeiros MD;  Location: [facility];  Service: Endoscopy;  Laterality: N/A;    ESOPHAGOGASTRODUODENOSCOPY  12/20/2021    ESOPHAGOGASTRODUODENOSCOPY N/A 12/20/2021    Procedure: EGD (ESOPHAGOGASTRODUODENOSCOPY);  Surgeon: Ajay Jackson MD;  Location: [facility];  Service: Endoscopy;  Laterality: N/A;    ESOPHAGOGASTRODUODENOSCOPY N/A 05/03/2022    Procedure: EGD (ESOPHAGOGASTRODUODENOSCOPY);  Surgeon: Brian Trivedi MD;  Location: [facility];  Service: Endoscopy;  Laterality: N/A;    ESOPHAGOGASTRODUODENOSCOPY N/A 7/7/2022    Procedure: EGD (ESOPHAGOGASTRODUODENOSCOPY);  Surgeon: Ajay Jackson MD;  Location: [facility];  Service: Endoscopy;  Laterality: N/A;    HERNIA REPAIR Left     Inguinal    UPPER GASTROINTESTINAL ENDOSCOPY N/A 07/07/2022       Family History   Problem Relation Age of Onset    Hypertension Mother     Breast cancer Mother     Hypertension Father     Prostate cancer Father     Bladder Cancer  Father        Social History     Socioeconomic History    Marital status: Legally    Tobacco Use    Smoking status: Former     Types: Cigarettes     Quit date: 2000     Years since quittin.9    Smokeless tobacco: Never    Tobacco comments:     quit 20 years ago   Substance and Sexual Activity    Alcohol use: Not Currently     Comment: mary    Drug use: Yes     Types: Marijuana     Comment: occasionally    Sexual activity: Yes     Comment: occ     Social Determinants of Health     Financial Resource Strain: Low Risk     Difficulty of Paying Living Expenses: Not very hard   Food Insecurity: No Food Insecurity    Worried About Running Out of Food in the Last Year: Never true    Ran Out of Food in the Last Year: Never true   Transportation Needs: No Transportation Needs    Lack of Transportation (Medical): No    Lack of Transportation (Non-Medical): No   Physical Activity: Inactive    Days of Exercise per Week: 0 days    Minutes of Exercise per Session: 0 min   Stress: No Stress Concern Present    Feeling of Stress : Not at all   Social Connections: Moderately Integrated    Frequency of Communication with Friends and Family: More than three times a week    Frequency of Social Gatherings with Friends and Family: More than three times a week    Attends Zoroastrian Services: Never    Active Member of Clubs or Organizations: Yes    Attends Club or Organization Meetings: Never    Marital Status:    Housing Stability: Low Risk     Unable to Pay for Housing in the Last Year: No    Number of Places Lived in the Last Year: 1    Unstable Housing in the Last Year: No       No current facility-administered medications on file prior to encounter.     Current Outpatient Medications on File Prior to Encounter   Medication Sig Dispense Refill    acamprosate (CAMPRAL) 333 mg tablet Take 2 tablets (666 mg total) by mouth 3 (three) times daily. 180 tablet 11    amLODIPine (NORVASC) 5 MG tablet Take 1 tablet (5 mg total)  by mouth once daily. 30 tablet 2    augmented betamethasone dipropionate (DIPROLENE-AF) 0.05 % cream Apply topically 2 (two) times daily. 50 g 2    carvediloL (COREG) 12.5 MG tablet Take 1 tablet (12.5 mg total) by mouth 2 (two) times daily with meals. 60 tablet 11    clindamycin-benzoyl peroxide (BENZACLIN) gel Apply topically 2 (two) times daily. 50 g 1    famotidine (PEPCID) 20 MG tablet 1 tablet at bedtime as needed      ferrous gluconate (FERGON) 240 (27 FE) MG tablet Take 2 tablets (480 mg total) by mouth 2 (two) times daily with meals. (Patient taking differently: Take 480 mg by mouth 2 (two) times daily with meals. Takes PRN) 120 tablet 3    folic acid (FOLVITE) 1 MG tablet Take 1 tablet (1 mg total) by mouth once daily. 30 tablet 5    lactulose (CHRONULAC) 10 gram/15 mL solution Take 30 mLs (20 g total) by mouth 3 (three) times daily. TITRATE TO 3-4 BOWEL MOVEMENTS DAILY. 2700 mL 2    nortriptyline (PAMELOR) 25 MG capsule Take 1 capsule (25 mg total) by mouth every evening. 30 capsule 3    oxyCODONE (ROXICODONE) 5 MG immediate release tablet Take 1 tablet (5 mg total) by mouth 2 (two) times daily as needed for Pain. 60 tablet 0    [START ON 12/13/2022] oxyCODONE (ROXICODONE) 5 MG immediate release tablet Take 1 tablet (5 mg total) by mouth 2 (two) times daily as needed for Pain. (Patient not taking: Reported on 11/7/2022) 60 tablet 0    [START ON 1/12/2023] oxyCODONE (ROXICODONE) 5 MG immediate release tablet Take 1 tablet (5 mg total) by mouth 2 (two) times daily as needed for Pain. (Patient not taking: Reported on 11/7/2022) 60 tablet 0    pantoprazole (PROTONIX) 40 MG tablet Take 1 tablet (40 mg total) by mouth 2 (two) times daily. 60 tablet 11    rifAXIMin (XIFAXAN) 550 mg Tab Take 1 tablet (550 mg total) by mouth 2 (two) times daily. 60 tablet 11    sildenafiL (VIAGRA) 100 MG tablet Take 1 tablet (100 mg total) by mouth daily as needed for Erectile Dysfunction. 30 tablet 5    thiamine 100 MG tablet  Take 1 tablet (100 mg total) by mouth once daily. 30 tablet 5    [DISCONTINUED] furosemide (LASIX) 40 MG tablet Take 0.5 tablets (20 mg total) by mouth once daily. 30 tablet 1    [DISCONTINUED] spironolactone (ALDACTONE) 50 MG tablet Take 1 tablet (50 mg total) by mouth once daily. (Patient not taking: No sig reported) 30 tablet 11       Review of patient's allergies indicates:   Allergen Reactions    Ciprofloxacin hcl Hallucinations    Meperidine Hives     Pt medicated w/multiple medications (demerol, protonix, and zofran)  w/i 10 mins time frame prior to developing localized hives near IV site that med was administered. Pt reports he has/takes all other medications on daily basis.     Morphine Itching    Nsaids (non-steroidal anti-inflammatory drug)      Kidney Disease    Tylenol [acetaminophen]      Hx of liver disease       Review of Systems   Constitutional:  Negative for chills and fever.   HENT:  Negative for congestion and sore throat.    Eyes:  Negative for blurred vision and double vision.   Respiratory:  Negative for cough and shortness of breath.    Cardiovascular:  Negative for chest pain and palpitations.   Gastrointestinal:         See HPI   Genitourinary:  Negative for frequency and urgency.   Musculoskeletal:  Negative for joint pain and myalgias.   Skin:  Negative for itching and rash.   Neurological:  Negative for sensory change and focal weakness.      Objective:     Vitals:    11/28/22 1624   BP:    Pulse:    Resp: 18   Temp:          Constitutional:  not in acute distress and well developed  HENT: Head: Normal, normocephalic, atraumatic.  Eyes: conjunctiva clear and sclera nonicteric  Cardiovascular: regular rate and rhythm and no murmur  Respiratory: normal chest expansion & respiratory effort   and no accessory muscle use  GI: soft, non-tender, without masses or organomegaly  Musculoskeletal: no muscular tenderness noted  Skin: normal color  Neurological: alert, oriented x3  Psychiatric: mood  and affect are within normal limits, pt is a good historian; no memory problems were noted      Significant Labs:  Recent Labs   Lab 11/25/22  1007 11/28/22  0925   HGB 8.6* 11.3*       Lab Results   Component Value Date    WBC 7.13 11/28/2022    HGB 11.3 (L) 11/28/2022    HCT 34.9 (L) 11/28/2022     (H) 11/28/2022     (L) 11/28/2022       Lab Results   Component Value Date     11/28/2022    K 3.7 11/28/2022     11/28/2022    CO2 21 (L) 11/28/2022    BUN 17 11/28/2022    CREATININE 1.9 (H) 11/28/2022    CALCIUM 9.3 11/28/2022    ANIONGAP 12 11/28/2022    ESTGFRAFRICA 39.8 (A) 07/21/2022    EGFRNONAA 34.4 (A) 07/21/2022       Lab Results   Component Value Date    ALT 27 11/28/2022    AST 57 (H) 11/28/2022    GGT 81 (H) 07/21/2022    ALKPHOS 166 (H) 11/28/2022    BILITOT 5.0 (H) 11/28/2022       Lab Results   Component Value Date    INR 1.4 (H) 11/28/2022    INR 1.4 (H) 11/25/2022    INR 1.4 (H) 11/02/2022       Significant Imaging:  Reviewed pertinent radiology findings.       Assessment/Plan:     Irvin Diaz  is a 47 y.o. male with history of decompensated cirrhosis, JAY, HTN here for melena and abdominal pain. Hgb stable, actually improved from baseline and is hemodynamically stable. Low concern for variceal bleed, OK to hold off on octreotide. Does have history of mild PUD, possible he may have developed recurrent ulcer with new abdominal pain. Would continue IV PPI and CTX for SBP ppx but could likely defer endoscopy if H/H remains stable throughout observation.    Problem List:  Melena  Decompensated cirrhosis  History of PUD    Recommendations:  - Trend Hgb q24 hrs. Transfuse for Hgb <7, unless otherwise indicated  - No plans for endoscopy at this time unless drop in H/H  - Maintain IV access with 2 large bore Ivs  - Intravascular resuscitation/support with IVFs   - NPO at midnight  - Hold all NSAIDs and anticoagulants, unless contraindicated  - Bolus IV pantoprazole 80mg  followed by 40mg BID  - Would continue CTX for SBP prophylaxis but hold off on octreotide with low concern for variceal bleed  - Please correct any coagulopathy with platelets and FFP for goal of platelets >50K and INR <2.0  - Please notify GI team if there is significant change in patient's clinical status      Thank you for involving us in the care of Irvinradha Diaz Jr.. Please call with any additional questions, concerns or changes in the patient's clinical status. We will continue to follow.     Tan Braun MD  Gastroenterology Fellow PGY IV  [facility]

## 2022-11-28 NOTE — HPI
Irvin Diaz is a 47YOM with history of alcoholic cirrhosis, HTN, iron-deficiency anemia who presented to the ED with a several day history of abdominal pain and decreased PO intake + a 1d history of melena and bloody stools. He reports that he intermittently has GI bleeding historically with upper (varices) and lower (polyps) bleeding. He has had some non-bloody emesis. Denies missing any doses of medications. Has not had fevers, URI sx. Does report some hallucinations, which he associates with constipation. Improved when he started taking lactulose again. Has not had alcohol in 3.5 months.    In the ED he was afebrile, hypertensive. Given home dose of amlodipine, coreg. Labs with stable Hgb, Cr. Ammonia WNL. MELD 22. CT without obstruction. Rectal exam with dark brown stool. Was given IV PPI, Rocephin, and admitted to the hospital medicine service for further management.

## 2022-11-28 NOTE — H&P
"[facility] - Emergency Dept  [facility] Medicine  History & Physical    Patient Name: Irvin Diaz Jr.  MRN: [id number]  Patient Class: OP- Observation  Admission Date: 11/28/2022  Attending Physician: Keesha Martin MD   Primary Care Provider: Edouard Gibson MD         Patient information was obtained from patient and ER records.     Subjective:     Principal Problem:<principal problem not specified>    Chief Complaint:   Chief Complaint   Patient presents with    Abdominal Pain     Hx of cirrhosis states blood in stool and starting to "hallucinated"         HPI: Irvin Diaz is a 47YOM with history of alcoholic cirrhosis, HTN, iron-deficiency anemia who presented to the ED with a several day history of abdominal pain and decreased PO intake + a 1d history of melena and bloody stools. He reports that he intermittently has GI bleeding historically with upper (varices) and lower (polyps) bleeding. He has had some non-bloody emesis. Denies missing any doses of medications. Has not had fevers, URI sx. Does report some hallucinations, which he associates with constipation. Improved when he started taking lactulose again. Has not had alcohol in 3.5 months.    In the ED he was afebrile, hypertensive. Given home dose of amlodipine, coreg. Labs with stable Hgb, Cr. Ammonia WNL. MELD 22. CT without obstruction. Rectal exam with dark brown stool. Was given IV PPI, Rocephin, and admitted to the hospital medicine service for further management.       Past Medical History:   Diagnosis Date    Alcoholic cirrhosis of liver     Arthritis     ATN (acute tubular necrosis)     CHF (congestive heart failure)     Diverticulitis 01/2020    Esophageal varices     GERD (gastroesophageal reflux disease)     GI bleed     Hip arthritis     Left    Hypertension     Liver cirrhosis     Macrocytic anemia     Pulmonary embolism 08/2018    Unprovoked DVT.  Stop Coumadin due to GIB    Thrombocytopenia        Past Surgical " History:   Procedure Laterality Date    ANKLE SURGERY      BLOCK, NERVE, PERIPHERAL Left 06/24/2022    Procedure: Left Hip femoral-obturator accessory nerve block;  Surgeon: Robbin De La Garza DO;  Location: [facility];  Service: Pain Management;  Laterality: Left;    COLON SURGERY  2007    [facility]  09/05/2019    [facility]    COLONOSCOPY N/A 02/17/2021    Procedure: COLONOSCOPY;  Surgeon: Renea Billingsley MD;  Location: [facility];  Service: Endoscopy;  Laterality: N/A;    COLONOSCOPY W/ BIOPSIES  02/17/2021    ESOPHAGOGASTRODUODENOSCOPY N/A 07/26/2019    Procedure: EGD (ESOPHAGOGASTRODUODENOSCOPY);  Surgeon: Brian Trivedi MD;  Location: [facility];  Service: Endoscopy;  Laterality: N/A;    ESOPHAGOGASTRODUODENOSCOPY N/A 04/08/2020    Procedure: EGD (ESOPHAGOGASTRODUODENOSCOPY);  Surgeon: Donn Acuna MD;  Location: [facility];  Service: Endoscopy;  Laterality: N/A;    ESOPHAGOGASTRODUODENOSCOPY N/A 01/03/2021    Procedure: EGD (ESOPHAGOGASTRODUODENOSCOPY)- coffee ground emesis, hx varices;  Surgeon: Steve Chairez MD;  Location: [facility];  Service: Endoscopy;  Laterality: N/A;    ESOPHAGOGASTRODUODENOSCOPY N/A 05/03/2021    Procedure: EGD (ESOPHAGOGASTRODUODENOSCOPY);  Surgeon: Jeanmarie Ngo MD;  Location: [facility];  Service: Endoscopy;  Laterality: N/A;    ESOPHAGOGASTRODUODENOSCOPY N/A 07/19/2021    Procedure: ESOPHAGOGASTRODUODENOSCOPY (EGD);  Surgeon: Claudio Medeiros MD;  Location: [facility];  Service: Endoscopy;  Laterality: N/A;    ESOPHAGOGASTRODUODENOSCOPY  12/20/2021    ESOPHAGOGASTRODUODENOSCOPY N/A 12/20/2021    Procedure: EGD (ESOPHAGOGASTRODUODENOSCOPY);  Surgeon: Ajay Jackson MD;  Location: [facility];  Service: Endoscopy;  Laterality: N/A;    ESOPHAGOGASTRODUODENOSCOPY N/A 05/03/2022    Procedure: EGD (ESOPHAGOGASTRODUODENOSCOPY);  Surgeon: Brian Trivedi MD;  Location: [facility];  Service: Endoscopy;  Laterality: N/A;    ESOPHAGOGASTRODUODENOSCOPY N/A  7/7/2022    Procedure: EGD (ESOPHAGOGASTRODUODENOSCOPY);  Surgeon: Ajay Jackson MD;  Location: [facility];  Service: Endoscopy;  Laterality: N/A;    HERNIA REPAIR Left     Inguinal    UPPER GASTROINTESTINAL ENDOSCOPY N/A 07/07/2022       Review of patient's allergies indicates:   Allergen Reactions    Ciprofloxacin hcl Hallucinations    Meperidine Hives     Pt medicated w/multiple medications (demerol, protonix, and zofran)  w/i 10 mins time frame prior to developing localized hives near IV site that med was administered. Pt reports he has/takes all other medications on daily basis.     Morphine Itching    Nsaids (non-steroidal anti-inflammatory drug)      Kidney Disease    Tylenol [acetaminophen]      Hx of liver disease       No current facility-administered medications on file prior to encounter.     Current Outpatient Medications on File Prior to Encounter   Medication Sig    acamprosate (CAMPRAL) 333 mg tablet Take 2 tablets (666 mg total) by mouth 3 (three) times daily.    amLODIPine (NORVASC) 5 MG tablet Take 1 tablet (5 mg total) by mouth once daily.    augmented betamethasone dipropionate (DIPROLENE-AF) 0.05 % cream Apply topically 2 (two) times daily.    carvediloL (COREG) 12.5 MG tablet Take 1 tablet (12.5 mg total) by mouth 2 (two) times daily with meals.    clindamycin-benzoyl peroxide (BENZACLIN) gel Apply topically 2 (two) times daily.    famotidine (PEPCID) 20 MG tablet 1 tablet at bedtime as needed    ferrous gluconate (FERGON) 240 (27 FE) MG tablet Take 2 tablets (480 mg total) by mouth 2 (two) times daily with meals. (Patient taking differently: Take 480 mg by mouth 2 (two) times daily with meals. Takes PRN)    folic acid (FOLVITE) 1 MG tablet Take 1 tablet (1 mg total) by mouth once daily.    lactulose (CHRONULAC) 10 gram/15 mL solution Take 30 mLs (20 g total) by mouth 3 (three) times daily. TITRATE TO 3-4 BOWEL MOVEMENTS DAILY.    nortriptyline (PAMELOR) 25 MG capsule Take 1  capsule (25 mg total) by mouth every evening.    oxyCODONE (ROXICODONE) 5 MG immediate release tablet Take 1 tablet (5 mg total) by mouth 2 (two) times daily as needed for Pain.    [START ON 2022] oxyCODONE (ROXICODONE) 5 MG immediate release tablet Take 1 tablet (5 mg total) by mouth 2 (two) times daily as needed for Pain. (Patient not taking: Reported on 2022)    [START ON 2023] oxyCODONE (ROXICODONE) 5 MG immediate release tablet Take 1 tablet (5 mg total) by mouth 2 (two) times daily as needed for Pain. (Patient not taking: Reported on 2022)    pantoprazole (PROTONIX) 40 MG tablet Take 1 tablet (40 mg total) by mouth 2 (two) times daily.    rifAXIMin (XIFAXAN) 550 mg Tab Take 1 tablet (550 mg total) by mouth 2 (two) times daily.    sildenafiL (VIAGRA) 100 MG tablet Take 1 tablet (100 mg total) by mouth daily as needed for Erectile Dysfunction.    thiamine 100 MG tablet Take 1 tablet (100 mg total) by mouth once daily.    [DISCONTINUED] furosemide (LASIX) 40 MG tablet Take 0.5 tablets (20 mg total) by mouth once daily.    [DISCONTINUED] spironolactone (ALDACTONE) 50 MG tablet Take 1 tablet (50 mg total) by mouth once daily. (Patient not taking: No sig reported)     Family History       Problem Relation (Age of Onset)    Bladder Cancer Father    Breast cancer Mother    Hypertension Mother, Father    Prostate cancer Father          Tobacco Use    Smoking status: Former     Types: Cigarettes     Quit date: 2000     Years since quittin.9    Smokeless tobacco: Never    Tobacco comments:     quit 20 years ago   Substance and Sexual Activity    Alcohol use: Not Currently     Comment: freq    Drug use: Yes     Types: Marijuana     Comment: occasionally    Sexual activity: Yes     Comment: occ     Review of Systems   Constitutional:  Positive for appetite change. Negative for activity change and fever.   HENT:  Negative for congestion and trouble swallowing.    Eyes:  Negative for  visual disturbance.   Respiratory:  Negative for cough and shortness of breath.    Cardiovascular:  Negative for leg swelling.   Gastrointestinal:  Positive for abdominal pain, blood in stool and nausea. Negative for diarrhea and vomiting.   Genitourinary:  Negative for dysuria.   Skin:  Negative for wound.   Neurological:  Negative for weakness and headaches.   Psychiatric/Behavioral:  Positive for hallucinations. Negative for confusion.    Objective:     Vital Signs (Most Recent):  Temp: 98.6 °F (37 °C) (11/28/22 0946)  Pulse: 100 (11/28/22 1302)  Resp: 18 (11/28/22 0946)  BP: (!) 173/97 (11/28/22 1302)  SpO2: 100 % (11/28/22 1302)   Vital Signs (24h Range):  Temp:  [98.5 °F (36.9 °C)-98.6 °F (37 °C)] 98.6 °F (37 °C)  Pulse:  [] 100  Resp:  [18] 18  SpO2:  [98 %-100 %] 100 %  BP: (162-202)/() 173/97     Weight: 90.7 kg (200 lb)  Body mass index is 27.12 kg/m².    Physical Exam  Vitals and nursing note reviewed.   Constitutional:       General: He is not in acute distress.     Appearance: Normal appearance. He is not ill-appearing.   HENT:      Head: Normocephalic and atraumatic.   Eyes:      Extraocular Movements: Extraocular movements intact.      Conjunctiva/sclera: Conjunctivae normal.   Cardiovascular:      Rate and Rhythm: Normal rate and regular rhythm.      Heart sounds: Normal heart sounds.   Pulmonary:      Effort: Pulmonary effort is normal. No respiratory distress.      Breath sounds: Normal breath sounds.   Abdominal:      General: Bowel sounds are normal.      Palpations: Abdomen is soft.      Tenderness: There is abdominal tenderness. There is no guarding or rebound.   Musculoskeletal:      Cervical back: Normal range of motion and neck supple.   Skin:     General: Skin is warm and dry.      Capillary Refill: Capillary refill takes less than 2 seconds.   Neurological:      General: No focal deficit present.      Mental Status: He is alert.   Psychiatric:         Mood and Affect: Mood  normal.         Behavior: Behavior normal.         Thought Content: Thought content normal.         Judgment: Judgment normal.           Significant Labs: All pertinent labs within the past 24 hours have been reviewed.    Significant Imaging: I have reviewed all pertinent imaging results/findings within the past 24 hours.    Assessment/Plan:     Melena  Continue IV PPI  Continue Rocephin ppx  GI consulted, appreciate recs  Clear liquid diet overnight  NPO at midnight pending recs        Alcohol use disorder, severe, dependence  In remission, with no alcohol use reported in 2.5 months  PETH pending  Will monitor for s/s withdrawal    CKD (chronic kidney disease) stage 3, GFR 30-59 ml/min  Cr 1.9 on admission, at baseline  Continue to monitor and avoid nephrotoxic meds as able      Alcoholic cirrhosis of liver  Hepatology consulted  Admission MELD 22  Continue rifampin and lactulose        Essential hypertension  Continue home coreg and amlodipine  Hydralazine PRN        VTE Risk Mitigation (From admission, onward)         Ordered     IP VTE LOW RISK PATIENT  Once         11/28/22 1520     Place sequential compression device  Until discontinued         11/28/22 1520                   May MIGUELITO Martin MD  Department of Hospital Medicine   [facility] - Emergency Dept

## 2022-11-28 NOTE — ED NOTES
"Irvin Diaz ., a 47 y.o. male presents to the ED ambulatory, AAOx3, blood in stool, abdominal pain     Triage note:  Chief Complaint   Patient presents with    Abdominal Pain     Hx of cirrhosis states blood in stool and starting to "hallucinated"      Review of patient's allergies indicates:   Allergen Reactions    Ciprofloxacin hcl Hallucinations    Meperidine Hives     Pt medicated w/multiple medications (demerol, protonix, and zofran)  w/i 10 mins time frame prior to developing localized hives near IV site that med was administered. Pt reports he has/takes all other medications on daily basis.     Morphine Itching    Nsaids (non-steroidal anti-inflammatory drug)      Kidney Disease    Tylenol [acetaminophen]      Hx of liver disease     Past Medical History:   Diagnosis Date    Alcoholic cirrhosis of liver     Arthritis     ATN (acute tubular necrosis)     CHF (congestive heart failure)     Diverticulitis 01/2020    Esophageal varices     GERD (gastroesophageal reflux disease)     GI bleed     Hip arthritis     Left    Hypertension     Liver cirrhosis     Macrocytic anemia     Pulmonary embolism 08/2018    Unprovoked DVT.  Stop Coumadin due to GIB    Thrombocytopenia       "

## 2022-11-28 NOTE — ED NOTES
Pt reporting 10/10 pain, no PRN medications available. [facility] med MD notified, awaiting new orders

## 2022-11-28 NOTE — SUBJECTIVE & OBJECTIVE
Past Medical History:   Diagnosis Date    Alcoholic cirrhosis of liver     Arthritis     ATN (acute tubular necrosis)     CHF (congestive heart failure)     Diverticulitis 01/2020    Esophageal varices     GERD (gastroesophageal reflux disease)     GI bleed     Hip arthritis     Left    Hypertension     Liver cirrhosis     Macrocytic anemia     Pulmonary embolism 08/2018    Unprovoked DVT.  Stop Coumadin due to GIB    Thrombocytopenia        Past Surgical History:   Procedure Laterality Date    ANKLE SURGERY      BLOCK, NERVE, PERIPHERAL Left 06/24/2022    Procedure: Left Hip femoral-obturator accessory nerve block;  Surgeon: Robbin De La Garza DO;  Location: [facility];  Service: Pain Management;  Laterality: Left;    COLON SURGERY  2007    COLONOSCOPY  09/05/2019    [facility]    COLONOSCOPY N/A 02/17/2021    Procedure: COLONOSCOPY;  Surgeon: Renea Billingsley MD;  Location: [facility];  Service: Endoscopy;  Laterality: N/A;    COLONOSCOPY W/ BIOPSIES  02/17/2021    ESOPHAGOGASTRODUODENOSCOPY N/A 07/26/2019    Procedure: EGD (ESOPHAGOGASTRODUODENOSCOPY);  Surgeon: Brian Trivedi MD;  Location: [facility];  Service: Endoscopy;  Laterality: N/A;    ESOPHAGOGASTRODUODENOSCOPY N/A 04/08/2020    Procedure: EGD (ESOPHAGOGASTRODUODENOSCOPY);  Surgeon: Donn Acuna MD;  Location: [facility];  Service: Endoscopy;  Laterality: N/A;    ESOPHAGOGASTRODUODENOSCOPY N/A 01/03/2021    Procedure: EGD (ESOPHAGOGASTRODUODENOSCOPY)- coffee ground emesis, hx varices;  Surgeon: Steve Chairez MD;  Location: [facility];  Service: Endoscopy;  Laterality: N/A;    ESOPHAGOGASTRODUODENOSCOPY N/A 05/03/2021    Procedure: EGD (ESOPHAGOGASTRODUODENOSCOPY);  Surgeon: Jeanmarie Ngo MD;  Location: [facility];  Service: Endoscopy;  Laterality: N/A;    ESOPHAGOGASTRODUODENOSCOPY N/A 07/19/2021    Procedure: ESOPHAGOGASTRODUODENOSCOPY (EGD);  Surgeon: Claudio Medeiros MD;  Location: [facility];  Service: Endoscopy;  Laterality: N/A;     ESOPHAGOGASTRODUODENOSCOPY  12/20/2021    ESOPHAGOGASTRODUODENOSCOPY N/A 12/20/2021    Procedure: EGD (ESOPHAGOGASTRODUODENOSCOPY);  Surgeon: Ajay Jackson MD;  Location: [facility];  Service: Endoscopy;  Laterality: N/A;    ESOPHAGOGASTRODUODENOSCOPY N/A 05/03/2022    Procedure: EGD (ESOPHAGOGASTRODUODENOSCOPY);  Surgeon: Brian Trivedi MD;  Location: [facility];  Service: Endoscopy;  Laterality: N/A;    ESOPHAGOGASTRODUODENOSCOPY N/A 7/7/2022    Procedure: EGD (ESOPHAGOGASTRODUODENOSCOPY);  Surgeon: Ajay Jackson MD;  Location: [facility];  Service: Endoscopy;  Laterality: N/A;    HERNIA REPAIR Left     Inguinal    UPPER GASTROINTESTINAL ENDOSCOPY N/A 07/07/2022       Review of patient's allergies indicates:   Allergen Reactions    Ciprofloxacin hcl Hallucinations    Meperidine Hives     Pt medicated w/multiple medications (demerol, protonix, and zofran)  w/i 10 mins time frame prior to developing localized hives near IV site that med was administered. Pt reports he has/takes all other medications on daily basis.     Morphine Itching    Nsaids (non-steroidal anti-inflammatory drug)      Kidney Disease    Tylenol [acetaminophen]      Hx of liver disease       No current facility-administered medications on file prior to encounter.     Current Outpatient Medications on File Prior to Encounter   Medication Sig    acamprosate (CAMPRAL) 333 mg tablet Take 2 tablets (666 mg total) by mouth 3 (three) times daily.    amLODIPine (NORVASC) 5 MG tablet Take 1 tablet (5 mg total) by mouth once daily.    augmented betamethasone dipropionate (DIPROLENE-AF) 0.05 % cream Apply topically 2 (two) times daily.    carvediloL (COREG) 12.5 MG tablet Take 1 tablet (12.5 mg total) by mouth 2 (two) times daily with meals.    clindamycin-benzoyl peroxide (BENZACLIN) gel Apply topically 2 (two) times daily.    famotidine (PEPCID) 20 MG tablet 1 tablet at bedtime as needed    ferrous gluconate (FERGON) 240 (27 FE) MG tablet Take 2  tablets (480 mg total) by mouth 2 (two) times daily with meals. (Patient taking differently: Take 480 mg by mouth 2 (two) times daily with meals. Takes PRN)    folic acid (FOLVITE) 1 MG tablet Take 1 tablet (1 mg total) by mouth once daily.    lactulose (CHRONULAC) 10 gram/15 mL solution Take 30 mLs (20 g total) by mouth 3 (three) times daily. TITRATE TO 3-4 BOWEL MOVEMENTS DAILY.    nortriptyline (PAMELOR) 25 MG capsule Take 1 capsule (25 mg total) by mouth every evening.    oxyCODONE (ROXICODONE) 5 MG immediate release tablet Take 1 tablet (5 mg total) by mouth 2 (two) times daily as needed for Pain.    [START ON 2022] oxyCODONE (ROXICODONE) 5 MG immediate release tablet Take 1 tablet (5 mg total) by mouth 2 (two) times daily as needed for Pain. (Patient not taking: Reported on 2022)    [START ON 2023] oxyCODONE (ROXICODONE) 5 MG immediate release tablet Take 1 tablet (5 mg total) by mouth 2 (two) times daily as needed for Pain. (Patient not taking: Reported on 2022)    pantoprazole (PROTONIX) 40 MG tablet Take 1 tablet (40 mg total) by mouth 2 (two) times daily.    rifAXIMin (XIFAXAN) 550 mg Tab Take 1 tablet (550 mg total) by mouth 2 (two) times daily.    sildenafiL (VIAGRA) 100 MG tablet Take 1 tablet (100 mg total) by mouth daily as needed for Erectile Dysfunction.    thiamine 100 MG tablet Take 1 tablet (100 mg total) by mouth once daily.    [DISCONTINUED] furosemide (LASIX) 40 MG tablet Take 0.5 tablets (20 mg total) by mouth once daily.    [DISCONTINUED] spironolactone (ALDACTONE) 50 MG tablet Take 1 tablet (50 mg total) by mouth once daily. (Patient not taking: No sig reported)     Family History       Problem Relation (Age of Onset)    Bladder Cancer Father    Breast cancer Mother    Hypertension Mother, Father    Prostate cancer Father          Tobacco Use    Smoking status: Former     Types: Cigarettes     Quit date: 2000     Years since quittin.9    Smokeless tobacco: Never     Tobacco comments:     quit 20 years ago   Substance and Sexual Activity    Alcohol use: Not Currently     Comment: freq    Drug use: Yes     Types: Marijuana     Comment: occasionally    Sexual activity: Yes     Comment: occ     Review of Systems   Constitutional:  Positive for appetite change. Negative for activity change and fever.   HENT:  Negative for congestion and trouble swallowing.    Eyes:  Negative for visual disturbance.   Respiratory:  Negative for cough and shortness of breath.    Cardiovascular:  Negative for leg swelling.   Gastrointestinal:  Positive for abdominal pain, blood in stool and nausea. Negative for diarrhea and vomiting.   Genitourinary:  Negative for dysuria.   Skin:  Negative for wound.   Neurological:  Negative for weakness and headaches.   Psychiatric/Behavioral:  Positive for hallucinations. Negative for confusion.    Objective:     Vital Signs (Most Recent):  Temp: 98.6 °F (37 °C) (11/28/22 0946)  Pulse: 100 (11/28/22 1302)  Resp: 18 (11/28/22 0946)  BP: (!) 173/97 (11/28/22 1302)  SpO2: 100 % (11/28/22 1302)   Vital Signs (24h Range):  Temp:  [98.5 °F (36.9 °C)-98.6 °F (37 °C)] 98.6 °F (37 °C)  Pulse:  [] 100  Resp:  [18] 18  SpO2:  [98 %-100 %] 100 %  BP: (162-202)/() 173/97     Weight: 90.7 kg (200 lb)  Body mass index is 27.12 kg/m².    Physical Exam  Vitals and nursing note reviewed.   Constitutional:       General: He is not in acute distress.     Appearance: Normal appearance. He is not ill-appearing.   HENT:      Head: Normocephalic and atraumatic.   Eyes:      Extraocular Movements: Extraocular movements intact.      Conjunctiva/sclera: Conjunctivae normal.   Cardiovascular:      Rate and Rhythm: Normal rate and regular rhythm.      Heart sounds: Normal heart sounds.   Pulmonary:      Effort: Pulmonary effort is normal. No respiratory distress.      Breath sounds: Normal breath sounds.   Abdominal:      General: Bowel sounds are normal.      Palpations: Abdomen  is soft.      Tenderness: There is abdominal tenderness. There is no guarding or rebound.   Musculoskeletal:      Cervical back: Normal range of motion and neck supple.   Skin:     General: Skin is warm and dry.      Capillary Refill: Capillary refill takes less than 2 seconds.   Neurological:      General: No focal deficit present.      Mental Status: He is alert.   Psychiatric:         Mood and Affect: Mood normal.         Behavior: Behavior normal.         Thought Content: Thought content normal.         Judgment: Judgment normal.           Significant Labs: All pertinent labs within the past 24 hours have been reviewed.    Significant Imaging: I have reviewed all pertinent imaging results/findings within the past 24 hours.

## 2022-11-28 NOTE — ASSESSMENT & PLAN NOTE
Continue IV PPI  Continue Rocephin ppx  GI consulted, appreciate recs  Clear liquid diet overnight  NPO at midnight pending recs

## 2022-11-28 NOTE — ASSESSMENT & PLAN NOTE
In remission, with no alcohol use reported in 2.5 months  PET pending  Will monitor for s/s withdrawal

## 2022-11-28 NOTE — H&P (VIEW-ONLY)
[facility]  Gastroenterology  Consult Note    Patient Name: Irvin Diaz Jr.  MRN: [id number]  Admission Date: 11/28/2022  Hospital Length of Stay: 0 days  Code Status: Full Code   Attending Provider: Keesha Martin MD   Consulting Provider: Tan Braun MD  Primary Care Physician: Edouard Gibson MD  Principal Problem:<principal problem not specified>    Inpatient consult to Gastroenterology  Consult performed by: Tan Braun MD  Consult ordered by: Keesha Martin MD      Subjective:     HPI: Irvin Diaz Jr. is a 47 y.o. male with history of decompensated EtOH cirrhosis, HTN, JAY who presents with abd pain and melena. Onset a day prior to presentation, had multiple melenic Bms. Had new onset epigastric abdominal pain the day prior. Denies NSAID use. Denies hematemesis, did have nonbloody emesis. Does not take PPI at home. Last EGD 4 months ago showed small esophageal varices, portal hypertensive gastropathy, but no stigmata of bleeding. Does have history of variceal bleed two years ago. Hgb 11, improved from baseline. HDS.        Past Medical History:   Diagnosis Date    Alcoholic cirrhosis of liver     Arthritis     ATN (acute tubular necrosis)     CHF (congestive heart failure)     Diverticulitis 01/2020    Esophageal varices     GERD (gastroesophageal reflux disease)     GI bleed     Hip arthritis     Left    Hypertension     Liver cirrhosis     Macrocytic anemia     Pulmonary embolism 08/2018    Unprovoked DVT.  Stop Coumadin due to GIB    Thrombocytopenia        Past Surgical History:   Procedure Laterality Date    ANKLE SURGERY      BLOCK, NERVE, PERIPHERAL Left 06/24/2022    Procedure: Left Hip femoral-obturator accessory nerve block;  Surgeon: Robbin De La Garza DO;  Location: [facility] OR;  Service: Pain Management;  Laterality: Left;    COLON SURGERY  2007    COLONOSCOPY  09/05/2019    [facility]    COLONOSCOPY N/A 02/17/2021    Procedure: COLONOSCOPY;  Surgeon:  Renea Billingsley MD;  Location: [facility];  Service: Endoscopy;  Laterality: N/A;    COLONOSCOPY W/ BIOPSIES  02/17/2021    ESOPHAGOGASTRODUODENOSCOPY N/A 07/26/2019    Procedure: EGD (ESOPHAGOGASTRODUODENOSCOPY);  Surgeon: Brian Trivedi MD;  Location: [facility];  Service: Endoscopy;  Laterality: N/A;    ESOPHAGOGASTRODUODENOSCOPY N/A 04/08/2020    Procedure: EGD (ESOPHAGOGASTRODUODENOSCOPY);  Surgeon: Donn Acuna MD;  Location: [facility];  Service: Endoscopy;  Laterality: N/A;    ESOPHAGOGASTRODUODENOSCOPY N/A 01/03/2021    Procedure: EGD (ESOPHAGOGASTRODUODENOSCOPY)- coffee ground emesis, hx varices;  Surgeon: Steve Chairez MD;  Location: [facility];  Service: Endoscopy;  Laterality: N/A;    ESOPHAGOGASTRODUODENOSCOPY N/A 05/03/2021    Procedure: EGD (ESOPHAGOGASTRODUODENOSCOPY);  Surgeon: Jeanmarie Ngo MD;  Location: [facility];  Service: Endoscopy;  Laterality: N/A;    ESOPHAGOGASTRODUODENOSCOPY N/A 07/19/2021    Procedure: ESOPHAGOGASTRODUODENOSCOPY (EGD);  Surgeon: Claudio Medeiros MD;  Location: [facility];  Service: Endoscopy;  Laterality: N/A;    ESOPHAGOGASTRODUODENOSCOPY  12/20/2021    ESOPHAGOGASTRODUODENOSCOPY N/A 12/20/2021    Procedure: EGD (ESOPHAGOGASTRODUODENOSCOPY);  Surgeon: Ajay Jackson MD;  Location: [facility];  Service: Endoscopy;  Laterality: N/A;    ESOPHAGOGASTRODUODENOSCOPY N/A 05/03/2022    Procedure: EGD (ESOPHAGOGASTRODUODENOSCOPY);  Surgeon: Brian Trivedi MD;  Location: [facility];  Service: Endoscopy;  Laterality: N/A;    ESOPHAGOGASTRODUODENOSCOPY N/A 7/7/2022    Procedure: EGD (ESOPHAGOGASTRODUODENOSCOPY);  Surgeon: Ajay Jackson MD;  Location: [facility];  Service: Endoscopy;  Laterality: N/A;    HERNIA REPAIR Left     Inguinal    UPPER GASTROINTESTINAL ENDOSCOPY N/A 07/07/2022       Family History   Problem Relation Age of Onset    Hypertension Mother     Breast cancer Mother     Hypertension Father     Prostate cancer Father     Bladder Cancer  Father        Social History     Socioeconomic History    Marital status: Legally    Tobacco Use    Smoking status: Former     Types: Cigarettes     Quit date: 2000     Years since quittin.9    Smokeless tobacco: Never    Tobacco comments:     quit 20 years ago   Substance and Sexual Activity    Alcohol use: Not Currently     Comment: mary    Drug use: Yes     Types: Marijuana     Comment: occasionally    Sexual activity: Yes     Comment: occ     Social Determinants of Health     Financial Resource Strain: Low Risk     Difficulty of Paying Living Expenses: Not very hard   Food Insecurity: No Food Insecurity    Worried About Running Out of Food in the Last Year: Never true    Ran Out of Food in the Last Year: Never true   Transportation Needs: No Transportation Needs    Lack of Transportation (Medical): No    Lack of Transportation (Non-Medical): No   Physical Activity: Inactive    Days of Exercise per Week: 0 days    Minutes of Exercise per Session: 0 min   Stress: No Stress Concern Present    Feeling of Stress : Not at all   Social Connections: Moderately Integrated    Frequency of Communication with Friends and Family: More than three times a week    Frequency of Social Gatherings with Friends and Family: More than three times a week    Attends Tenriism Services: Never    Active Member of Clubs or Organizations: Yes    Attends Club or Organization Meetings: Never    Marital Status:    Housing Stability: Low Risk     Unable to Pay for Housing in the Last Year: No    Number of Places Lived in the Last Year: 1    Unstable Housing in the Last Year: No       No current facility-administered medications on file prior to encounter.     Current Outpatient Medications on File Prior to Encounter   Medication Sig Dispense Refill    acamprosate (CAMPRAL) 333 mg tablet Take 2 tablets (666 mg total) by mouth 3 (three) times daily. 180 tablet 11    amLODIPine (NORVASC) 5 MG tablet Take 1 tablet (5 mg total)  by mouth once daily. 30 tablet 2    augmented betamethasone dipropionate (DIPROLENE-AF) 0.05 % cream Apply topically 2 (two) times daily. 50 g 2    carvediloL (COREG) 12.5 MG tablet Take 1 tablet (12.5 mg total) by mouth 2 (two) times daily with meals. 60 tablet 11    clindamycin-benzoyl peroxide (BENZACLIN) gel Apply topically 2 (two) times daily. 50 g 1    famotidine (PEPCID) 20 MG tablet 1 tablet at bedtime as needed      ferrous gluconate (FERGON) 240 (27 FE) MG tablet Take 2 tablets (480 mg total) by mouth 2 (two) times daily with meals. (Patient taking differently: Take 480 mg by mouth 2 (two) times daily with meals. Takes PRN) 120 tablet 3    folic acid (FOLVITE) 1 MG tablet Take 1 tablet (1 mg total) by mouth once daily. 30 tablet 5    lactulose (CHRONULAC) 10 gram/15 mL solution Take 30 mLs (20 g total) by mouth 3 (three) times daily. TITRATE TO 3-4 BOWEL MOVEMENTS DAILY. 2700 mL 2    nortriptyline (PAMELOR) 25 MG capsule Take 1 capsule (25 mg total) by mouth every evening. 30 capsule 3    oxyCODONE (ROXICODONE) 5 MG immediate release tablet Take 1 tablet (5 mg total) by mouth 2 (two) times daily as needed for Pain. 60 tablet 0    [START ON 12/13/2022] oxyCODONE (ROXICODONE) 5 MG immediate release tablet Take 1 tablet (5 mg total) by mouth 2 (two) times daily as needed for Pain. (Patient not taking: Reported on 11/7/2022) 60 tablet 0    [START ON 1/12/2023] oxyCODONE (ROXICODONE) 5 MG immediate release tablet Take 1 tablet (5 mg total) by mouth 2 (two) times daily as needed for Pain. (Patient not taking: Reported on 11/7/2022) 60 tablet 0    pantoprazole (PROTONIX) 40 MG tablet Take 1 tablet (40 mg total) by mouth 2 (two) times daily. 60 tablet 11    rifAXIMin (XIFAXAN) 550 mg Tab Take 1 tablet (550 mg total) by mouth 2 (two) times daily. 60 tablet 11    sildenafiL (VIAGRA) 100 MG tablet Take 1 tablet (100 mg total) by mouth daily as needed for Erectile Dysfunction. 30 tablet 5    thiamine 100 MG tablet  Take 1 tablet (100 mg total) by mouth once daily. 30 tablet 5    [DISCONTINUED] furosemide (LASIX) 40 MG tablet Take 0.5 tablets (20 mg total) by mouth once daily. 30 tablet 1    [DISCONTINUED] spironolactone (ALDACTONE) 50 MG tablet Take 1 tablet (50 mg total) by mouth once daily. (Patient not taking: No sig reported) 30 tablet 11       Review of patient's allergies indicates:   Allergen Reactions    Ciprofloxacin hcl Hallucinations    Meperidine Hives     Pt medicated w/multiple medications (demerol, protonix, and zofran)  w/i 10 mins time frame prior to developing localized hives near IV site that med was administered. Pt reports he has/takes all other medications on daily basis.     Morphine Itching    Nsaids (non-steroidal anti-inflammatory drug)      Kidney Disease    Tylenol [acetaminophen]      Hx of liver disease       Review of Systems   Constitutional:  Negative for chills and fever.   HENT:  Negative for congestion and sore throat.    Eyes:  Negative for blurred vision and double vision.   Respiratory:  Negative for cough and shortness of breath.    Cardiovascular:  Negative for chest pain and palpitations.   Gastrointestinal:         See HPI   Genitourinary:  Negative for frequency and urgency.   Musculoskeletal:  Negative for joint pain and myalgias.   Skin:  Negative for itching and rash.   Neurological:  Negative for sensory change and focal weakness.      Objective:     Vitals:    11/28/22 1624   BP:    Pulse:    Resp: 18   Temp:          Constitutional:  not in acute distress and well developed  HENT: Head: Normal, normocephalic, atraumatic.  Eyes: conjunctiva clear and sclera nonicteric  Cardiovascular: regular rate and rhythm and no murmur  Respiratory: normal chest expansion & respiratory effort   and no accessory muscle use  GI: soft, non-tender, without masses or organomegaly  Musculoskeletal: no muscular tenderness noted  Skin: normal color  Neurological: alert, oriented x3  Psychiatric: mood  and affect are within normal limits, pt is a good historian; no memory problems were noted      Significant Labs:  Recent Labs   Lab 11/25/22  1007 11/28/22  0925   HGB 8.6* 11.3*       Lab Results   Component Value Date    WBC 7.13 11/28/2022    HGB 11.3 (L) 11/28/2022    HCT 34.9 (L) 11/28/2022     (H) 11/28/2022     (L) 11/28/2022       Lab Results   Component Value Date     11/28/2022    K 3.7 11/28/2022     11/28/2022    CO2 21 (L) 11/28/2022    BUN 17 11/28/2022    CREATININE 1.9 (H) 11/28/2022    CALCIUM 9.3 11/28/2022    ANIONGAP 12 11/28/2022    ESTGFRAFRICA 39.8 (A) 07/21/2022    EGFRNONAA 34.4 (A) 07/21/2022       Lab Results   Component Value Date    ALT 27 11/28/2022    AST 57 (H) 11/28/2022    GGT 81 (H) 07/21/2022    ALKPHOS 166 (H) 11/28/2022    BILITOT 5.0 (H) 11/28/2022       Lab Results   Component Value Date    INR 1.4 (H) 11/28/2022    INR 1.4 (H) 11/25/2022    INR 1.4 (H) 11/02/2022       Significant Imaging:  Reviewed pertinent radiology findings.       Assessment/Plan:     Irvin Diaz  is a 47 y.o. male with history of decompensated cirrhosis, JAY, HTN here for melena and abdominal pain. Hgb stable, actually improved from baseline and is hemodynamically stable. Low concern for variceal bleed, OK to hold off on octreotide. Does have history of mild PUD, possible he may have developed recurrent ulcer with new abdominal pain. Would continue IV PPI and CTX for SBP ppx but could likely defer endoscopy if H/H remains stable throughout observation.    Problem List:  Melena  Decompensated cirrhosis  History of PUD    Recommendations:  - Trend Hgb q24 hrs. Transfuse for Hgb <7, unless otherwise indicated  - No plans for endoscopy at this time unless drop in H/H  - Maintain IV access with 2 large bore Ivs  - Intravascular resuscitation/support with IVFs   - NPO at midnight  - Hold all NSAIDs and anticoagulants, unless contraindicated  - Bolus IV pantoprazole 80mg  followed by 40mg BID  - Would continue CTX for SBP prophylaxis but hold off on octreotide with low concern for variceal bleed  - Please correct any coagulopathy with platelets and FFP for goal of platelets >50K and INR <2.0  - Please notify GI team if there is significant change in patient's clinical status      Thank you for involving us in the care of Irvinradha Diaz Jr.. Please call with any additional questions, concerns or changes in the patient's clinical status. We will continue to follow.     Tan Braun MD  Gastroenterology Fellow PGY IV  [facility]

## 2022-11-28 NOTE — Clinical Note
Diagnosis: Melena [[id number]]   Future Attending Provider: RHODA YOU [[id number]]   Admitting Provider:: RHODA YOU [[id number]]

## 2022-11-28 NOTE — ED PROVIDER NOTES
"Encounter Date: 11/28/2022       History     Chief Complaint   Patient presents with    Abdominal Pain     Hx of cirrhosis states blood in stool and starting to "hallucinated"      Irvin Diaz Jr. is a 47 y.o. male who  has a past medical history of Alcoholic cirrhosis of liver, Arthritis, ATN (acute tubular necrosis), CHF (congestive heart failure), Diverticulitis (01/2020), Esophageal varices, GERD (gastroesophageal reflux disease), GI bleed, Hip arthritis, Hypertension, Liver cirrhosis, Macrocytic anemia, Pulmonary embolism (08/2018), and Thrombocytopenia.    The patient presents to the ED due to multiple complaints.   Patient is accompanied by family, who provides additional history.  Patient has been complaining of abdominal pain and nausea for the last 3 days. He has been unable to eat or drink anything in that time. He is unsure if he has bene able to take any of his medications.  He denies any vomiting or diarrhea. He does report noticing dark black stools over the last few days as well, most recently this morning.  He reports no fever.   He has also started to have "hallucinations" and episodes of confusion as well. He denies any current hallucinations.  He has history of alcoholic cirrhosis and is followed by hepatology.         Review of patient's allergies indicates:   Allergen Reactions    Ciprofloxacin hcl Hallucinations    Meperidine Hives     Pt medicated w/multiple medications (demerol, protonix, and zofran)  w/i 10 mins time frame prior to developing localized hives near IV site that med was administered. Pt reports he has/takes all other medications on daily basis.     Morphine Itching    Nsaids (non-steroidal anti-inflammatory drug)      Kidney Disease    Tylenol [acetaminophen]      Hx of liver disease     Past Medical History:   Diagnosis Date    Alcoholic cirrhosis of liver     Arthritis     ATN (acute tubular necrosis)     CHF (congestive heart failure)     Diverticulitis 01/2020    " Esophageal varices     GERD (gastroesophageal reflux disease)     GI bleed     Hip arthritis     Left    Hypertension     Liver cirrhosis     Macrocytic anemia     Pulmonary embolism 08/2018    Unprovoked DVT.  Stop Coumadin due to GIB    Thrombocytopenia      Past Surgical History:   Procedure Laterality Date    ANKLE SURGERY      BLOCK, NERVE, PERIPHERAL Left 06/24/2022    Procedure: Left Hip femoral-obturator accessory nerve block;  Surgeon: Robbin De La Garza DO;  Location: [facility];  Service: Pain Management;  Laterality: Left;    COLON SURGERY  2007    COLONOSCOPY  09/05/2019    [facility]    COLONOSCOPY N/A 02/17/2021    Procedure: COLONOSCOPY;  Surgeon: Renea Billingsley MD;  Location: [facility];  Service: Endoscopy;  Laterality: N/A;    COLONOSCOPY W/ BIOPSIES  02/17/2021    ESOPHAGOGASTRODUODENOSCOPY N/A 07/26/2019    Procedure: EGD (ESOPHAGOGASTRODUODENOSCOPY);  Surgeon: Brian Trivedi MD;  Location: [facility];  Service: Endoscopy;  Laterality: N/A;    ESOPHAGOGASTRODUODENOSCOPY N/A 04/08/2020    Procedure: EGD (ESOPHAGOGASTRODUODENOSCOPY);  Surgeon: Donn Acuna MD;  Location: [facility];  Service: Endoscopy;  Laterality: N/A;    ESOPHAGOGASTRODUODENOSCOPY N/A 01/03/2021    Procedure: EGD (ESOPHAGOGASTRODUODENOSCOPY)- coffee ground emesis, hx varices;  Surgeon: Steve Chairez MD;  Location: [facility];  Service: Endoscopy;  Laterality: N/A;    ESOPHAGOGASTRODUODENOSCOPY N/A 05/03/2021    Procedure: EGD (ESOPHAGOGASTRODUODENOSCOPY);  Surgeon: Jeanmarie Ngo MD;  Location: [facility];  Service: Endoscopy;  Laterality: N/A;    ESOPHAGOGASTRODUODENOSCOPY N/A 07/19/2021    Procedure: ESOPHAGOGASTRODUODENOSCOPY (EGD);  Surgeon: Claudio Medeiros MD;  Location: [facility];  Service: Endoscopy;  Laterality: N/A;    ESOPHAGOGASTRODUODENOSCOPY  12/20/2021    ESOPHAGOGASTRODUODENOSCOPY N/A 12/20/2021    Procedure: EGD (ESOPHAGOGASTRODUODENOSCOPY);  Surgeon: Ajay Jackson MD;  Location: [facility];   Service: Endoscopy;  Laterality: N/A;    ESOPHAGOGASTRODUODENOSCOPY N/A 2022    Procedure: EGD (ESOPHAGOGASTRODUODENOSCOPY);  Surgeon: Brian Trivedi MD;  Location: [facility];  Service: Endoscopy;  Laterality: N/A;    ESOPHAGOGASTRODUODENOSCOPY N/A 2022    Procedure: EGD (ESOPHAGOGASTRODUODENOSCOPY);  Surgeon: Ajay Jackson MD;  [facility];  Service: Endoscopy;  Laterality: N/A;    HERNIA REPAIR Left     Inguinal    UPPER GASTROINTESTINAL ENDOSCOPY N/A 2022     Family History   Problem Relation Age of Onset    Hypertension Mother     Breast cancer Mother     Hypertension Father     Prostate cancer Father     Bladder Cancer Father      Social History     Tobacco Use    Smoking status: Former     Types: Cigarettes     Quit date:      Years since quittin.    Smokeless tobacco: Never    Tobacco comments:     quit 20 years ago   Substance Use Topics    Alcohol use: Not Currently     Comment: freq    Drug use: Yes     Types: Marijuana     Comment: occasionally     Review of Systems   Constitutional:  Negative for chills and fever.   HENT:  Negative for sore throat.    Respiratory:  Negative for shortness of breath.    Cardiovascular:  Negative for chest pain.   Gastrointestinal:  Positive for abdominal pain, blood in stool (melena) and nausea. Negative for constipation, diarrhea and vomiting.   Genitourinary:  Negative for dysuria, frequency and urgency.   Musculoskeletal:  Negative for back pain.   Skin:  Negative for rash and wound.   Neurological:  Negative for weakness.   Hematological:  Does not bruise/bleed easily.   Psychiatric/Behavioral:  Positive for confusion and hallucinations. Negative for agitation and behavioral problems.      Physical Exam     Initial Vitals [22 0747]   BP Pulse Resp Temp SpO2   (!) 181/90 109 18 98.5 °F (36.9 °C) 100 %      MAP       --         Physical Exam    Nursing note and vitals reviewed.  Constitutional: He appears well-developed and  well-nourished. He is not diaphoretic. No distress.   Well-appearing, in no distress.    HENT:   Head: Normocephalic and atraumatic.   Mouth/Throat: Oropharynx is clear and moist.   Eyes: EOM are normal. Pupils are equal, round, and reactive to light.   Neck: No tracheal deviation present.   Cardiovascular:  Normal rate, regular rhythm, normal heart sounds and intact distal pulses.           Pulmonary/Chest: Breath sounds normal. No stridor. No respiratory distress.   Abdominal: Abdomen is soft and protuberant. Bowel sounds are normal. He exhibits no distension, no fluid wave and no mass. There is generalized abdominal tenderness.   No right CVA tenderness.  No left CVA tenderness. There is no rebound and no guarding.   Genitourinary:    Rectum normal.   Rectum:      No rectal mass, anal fissure, tenderness, external hemorrhoid, internal hemorrhoid or abnormal anal tone.      Genitourinary Comments: Rectal exam normal, no active bleeding or melena appreciated.      Musculoskeletal:         General: No edema. Normal range of motion.     Neurological: He is alert and oriented to person, place, and time. No cranial nerve deficit or sensory deficit.   Skin: Skin is warm and dry. Capillary refill takes less than 2 seconds. No rash noted.   Psychiatric: He has a normal mood and affect. His behavior is normal. Thought content normal.   No confusion or apparent hallucinations.        ED Course   Procedures  Labs Reviewed   LIPASE - Abnormal; Notable for the following components:       Result Value    Lipase 65 (*)     All other components within normal limits   CBC WITHOUT DIFFERENTIAL - Abnormal; Notable for the following components:    RBC 3.40 (*)     Hemoglobin 11.3 (*)     Hematocrit 34.9 (*)      (*)     MCH 33.2 (*)     RDW 16.1 (*)     Platelets 103 (*)     All other components within normal limits   COMPREHENSIVE METABOLIC PANEL - Abnormal; Notable for the following components:    CO2 21 (*)     Glucose 120  (*)     Creatinine 1.9 (*)     Total Protein 9.4 (*)     Albumin 3.2 (*)     Total Bilirubin 5.0 (*)     Alkaline Phosphatase 166 (*)     AST 57 (*)     eGFR 43.0 (*)     All other components within normal limits   PROTIME-INR - Abnormal; Notable for the following components:    Prothrombin Time 14.0 (*)     INR 1.4 (*)     All other components within normal limits   LACTIC ACID, PLASMA   AMMONIA   PROTIME-INR   OCCULT BLOOD X 1, STOOL   PHOSPHATIDYLETHANOL (PETH)          Imaging Results              CT Abdomen Pelvis  Without Contrast (Final result)  Result time 11/28/22 12:16:11      Final result by Manpreet Kingsley MD (11/28/22 12:16:11)                   Impression:      1. Nonspecific diffuse edematous wall thickening of the stomach, proximal duodenum, and proximal colon.  Findings may represent sequela of portal gastropathy and enterocolopathy. However, cannot entirely exclude gastric and bowel infection/inflammation.  No evidence of bowel obstruction.  Recommend clinical correlation.  2. Hepatic cirrhosis.  Sequela of portal venous hypertension including ascites, splenomegaly, and numerous upper abdominal collateral vessel formation.  3. Circumferential gallbladder wall thickening and edema.  Finding could represent sequela of hepatic dysfunction.  If clinical concern for acute cholecystitis, further evaluation may be obtained with right upper quadrant ultrasound.  4. Additional findings as above.      Electronically signed by: Manpreet Kingsley  Date:    11/28/2022  Time:    12:16               Narrative:    EXAMINATION:  CT ABDOMEN PELVIS WITHOUT CONTRAST    CLINICAL HISTORY:  Bowel obstruction suspected;    TECHNIQUE:  Low dose axial images, sagittal and coronal reformations were obtained from the lung bases to the pubic symphysis, without contrast.    COMPARISON:  CT 07/21/2022    FINDINGS:  Inferior heart is normal size.  No pericardial effusion.    Trace left pleural effusion.  Otherwise lung bases are  clear.    Cirrhotic liver morphology.  No focal hepatic lesion.    Circumferential gallbladder wall thickening and edema.  No significant biliary ductal dilatation.    Spleen is enlarged measuring 14.5 cm.  Small splenule.  Adrenal glands and pancreas are unremarkable.    No hydronephrosis or ureteral dilatation.  Bladder is nondistended and not well evaluated.  Prostate is normal size.    Diffuse edematous wall thickening of the stomach and duodenum.  Small and large bowel are normal caliber.  No evidence of bowel obstruction.  Postoperative changes of right hemicolectomy.  Diffuse edematous wall thickening of the proximal colon.  Colonic diverticulosis.    Small volume abdominopelvic ascites and diffuse mesenteric edema.    Numerous normal in upper limit of normal caliber mesenteric and retroperitoneal lymph nodes.    Abdominal aorta is normal caliber.  Mild aortoiliac calcific atherosclerosis.  Numerous upper abdominal collateral vessel formation including recanalized umbilical vein.    Body wall soft tissues are unremarkable.    Similar chronic deformity of the left femoral head with severe femoroacetabular degenerative changes.  No acute fracture or aggressive osseous lesion.                                       Medications   carvediloL tablet 12.5 mg (12.5 mg Oral Given 11/28/22 2051)   sodium chloride 0.9% flush 10 mL (has no administration in time range)   naloxone 0.4 mg/mL injection 0.02 mg (has no administration in time range)   glucose chewable tablet 16 g (has no administration in time range)   glucose chewable tablet 24 g (has no administration in time range)   glucagon (human recombinant) injection 1 mg (has no administration in time range)   dextrose 10% bolus 125 mL (has no administration in time range)   dextrose 10% bolus 250 mL (has no administration in time range)   melatonin tablet 6 mg (has no administration in time range)   aluminum-magnesium hydroxide-simethicone 200-200-20 mg/5 mL  suspension 30 mL (has no administration in time range)   acamprosate tablet 666 mg (666 mg Oral Given 11/28/22 2138)   amLODIPine tablet 5 mg (has no administration in time range)   folic acid tablet 1 mg (has no administration in time range)   lactulose 20 gram/30 mL solution Soln 20 g (20 g Oral Given 11/28/22 2050)   nortriptyline capsule 25 mg (25 mg Oral Given 11/28/22 2138)   rifAXIMin tablet 550 mg (550 mg Oral Given 11/28/22 2051)   thiamine tablet 100 mg (has no administration in time range)   pantoprazole injection 40 mg (40 mg Intravenous Given 11/28/22 2050)   cefTRIAXone (ROCEPHIN) 2 g/50 mL D5W IVPB (has no administration in time range)   hydrALAZINE tablet 25 mg (has no administration in time range)   oxyCODONE immediate release tablet 5 mg (has no administration in time range)   sodium chloride 0.9% bolus 250 mL (0 mLs Intravenous Stopped 11/28/22 1053)   cefTRIAXone (ROCEPHIN) 2 g/50 mL D5W IVPB (0 g Intravenous Stopped 11/28/22 1053)   pantoprazole injection 80 mg (80 mg Intravenous Given 11/28/22 1144)   ondansetron injection 4 mg (4 mg Intravenous Given 11/28/22 1308)   amLODIPine tablet 5 mg (5 mg Oral Given 11/28/22 1308)   HYDROmorphone injection 0.2 mg (0.2 mg Intravenous Given 11/28/22 2139)     Medical Decision Making:   History:   Old Medical Records: I decided to obtain old medical records.  Old Records Summarized: records from clinic visits and other records.       <> Summary of Records: History of alcoholic cirrhosis, ?admitted 11/1-11/4 for hepatic encephalopathy.   Followed by Hepatology, seen 10/25. MELD 21 at that time.   History of variceal bleed requiring banding in past.  Initial Assessment:   46 yo M with alcoholic cirrhosis presents with abdominal pain, confusion, melena.  Will obtain labs, guaiac, and continue to monitor.   Differential Diagnosis:   Differential Diagnosis includes, but is not limited to:  Lower GI bleed (AVM, colitis, colon cancer, polyp, internal/external  hemorrhoid, IBS, rectal injury/foreign body, chronic diarrhea, anal fissure), upper GI bleed (PUD, perforated ulcer, esophageal variceal bleed), Crohn's disease, ulcerative colitis, chronic diarrhea, dietary intake    Clinical Tests:   Lab Tests: Reviewed and Ordered  Radiological Study: Reviewed  Medical Tests: Ordered and Reviewed  ED Management:  Patient given IVF, IV Rocephin, IV Protonix due to history of gastric varices and melena.  Labs appear at baseline. H/H stable.  No current bleeding in ED. Vitals have remained stable.  CT A/P without acute findings. No focal ascites.  Given history of alcoholic cirrhosis with varices, patient is at high risk for UGIB and decompensation.   Will request observation for H/H trend and further management.         On re-evaluation, the patient's status has remained stable.  At this time, I believe the patient should be admitted to the hospital for further evaluation and management of melena.  HM service was contacted and the case was discussed.   The consulting physician/team agrees with plan and will admit under their service.   The patient and family were updated with test results, overall impression, and further plan of care. All questions were answered. The patient expressed understanding and agrees with the current plan.                            Clinical Impression:   Final diagnoses:  [K92.1] Melena (Primary)  [R10.13] Epigastric abdominal pain  [R11.0] Nausea  [R41.0] Confusion                    Pancho Morocho MD  11/29/22 0946

## 2022-11-29 ENCOUNTER — ANESTHESIA EVENT (OUTPATIENT)
Dept: ENDOSCOPY | Facility: HOSPITAL | Age: 47
End: 2022-11-29
Payer: MEDICARE

## 2022-11-29 ENCOUNTER — ANESTHESIA (OUTPATIENT)
Dept: ENDOSCOPY | Facility: HOSPITAL | Age: 47
End: 2022-11-29
Payer: MEDICARE

## 2022-11-29 PROBLEM — I85.10 SECONDARY ESOPHAGEAL VARICES WITHOUT BLEEDING: Status: ACTIVE | Noted: 2022-11-29

## 2022-11-29 LAB
ALBUMIN SERPL BCP-MCNC: 2.7 G/DL (ref 3.5–5.2)
ALP SERPL-CCNC: 133 U/L (ref 55–135)
ALT SERPL W/O P-5'-P-CCNC: 23 U/L (ref 10–44)
ANION GAP SERPL CALC-SCNC: 11 MMOL/L (ref 8–16)
AST SERPL-CCNC: 51 U/L (ref 10–40)
BASOPHILS # BLD AUTO: 0.03 K/UL (ref 0–0.2)
BASOPHILS NFR BLD: 0.4 % (ref 0–1.9)
BILIRUB SERPL-MCNC: 3.5 MG/DL (ref 0.1–1)
BUN SERPL-MCNC: 18 MG/DL (ref 6–20)
CALCIUM SERPL-MCNC: 8.6 MG/DL (ref 8.7–10.5)
CHLORIDE SERPL-SCNC: 105 MMOL/L (ref 95–110)
CO2 SERPL-SCNC: 20 MMOL/L (ref 23–29)
CREAT SERPL-MCNC: 1.9 MG/DL (ref 0.5–1.4)
DIFFERENTIAL METHOD: ABNORMAL
EOSINOPHIL # BLD AUTO: 0.1 K/UL (ref 0–0.5)
EOSINOPHIL NFR BLD: 1.7 % (ref 0–8)
ERYTHROCYTE [DISTWIDTH] IN BLOOD BY AUTOMATED COUNT: 15.9 % (ref 11.5–14.5)
EST. GFR  (NO RACE VARIABLE): 43 ML/MIN/1.73 M^2
GLUCOSE SERPL-MCNC: 90 MG/DL (ref 70–110)
HCT VFR BLD AUTO: 27.7 % (ref 40–54)
HGB BLD-MCNC: 9.2 G/DL (ref 14–18)
IMM GRANULOCYTES # BLD AUTO: 0.02 K/UL (ref 0–0.04)
IMM GRANULOCYTES NFR BLD AUTO: 0.3 % (ref 0–0.5)
INR PPP: 1.5 (ref 0.8–1.2)
LYMPHOCYTES # BLD AUTO: 1.8 K/UL (ref 1–4.8)
LYMPHOCYTES NFR BLD: 24.9 % (ref 18–48)
MAGNESIUM SERPL-MCNC: 1.9 MG/DL (ref 1.6–2.6)
MCH RBC QN AUTO: 34.3 PG (ref 27–31)
MCHC RBC AUTO-ENTMCNC: 33.2 G/DL (ref 32–36)
MCV RBC AUTO: 103 FL (ref 82–98)
MONOCYTES # BLD AUTO: 0.9 K/UL (ref 0.3–1)
MONOCYTES NFR BLD: 12.3 % (ref 4–15)
NEUTROPHILS # BLD AUTO: 4.2 K/UL (ref 1.8–7.7)
NEUTROPHILS NFR BLD: 60.4 % (ref 38–73)
NRBC BLD-RTO: 0 /100 WBC
PLATELET # BLD AUTO: 75 K/UL (ref 150–450)
PMV BLD AUTO: 10.8 FL (ref 9.2–12.9)
POTASSIUM SERPL-SCNC: 3.4 MMOL/L (ref 3.5–5.1)
PROT SERPL-MCNC: 7.6 G/DL (ref 6–8.4)
PROTHROMBIN TIME: 15.1 SEC (ref 9–12.5)
RBC # BLD AUTO: 2.68 M/UL (ref 4.6–6.2)
SODIUM SERPL-SCNC: 136 MMOL/L (ref 136–145)
WBC # BLD AUTO: 7.02 K/UL (ref 3.9–12.7)

## 2022-11-29 PROCEDURE — 80053 COMPREHEN METABOLIC PANEL: CPT | Performed by: STUDENT IN AN ORGANIZED HEALTH CARE EDUCATION/TRAINING PROGRAM

## 2022-11-29 PROCEDURE — 99214 OFFICE O/P EST MOD 30 MIN: CPT | Mod: GC,,, | Performed by: STUDENT IN AN ORGANIZED HEALTH CARE EDUCATION/TRAINING PROGRAM

## 2022-11-29 PROCEDURE — 96376 TX/PRO/DX INJ SAME DRUG ADON: CPT | Mod: 59

## 2022-11-29 PROCEDURE — 85610 PROTHROMBIN TIME: CPT | Performed by: STUDENT IN AN ORGANIZED HEALTH CARE EDUCATION/TRAINING PROGRAM

## 2022-11-29 PROCEDURE — 63600175 PHARM REV CODE 636 W HCPCS: Performed by: STUDENT IN AN ORGANIZED HEALTH CARE EDUCATION/TRAINING PROGRAM

## 2022-11-29 PROCEDURE — 83735 ASSAY OF MAGNESIUM: CPT | Performed by: STUDENT IN AN ORGANIZED HEALTH CARE EDUCATION/TRAINING PROGRAM

## 2022-11-29 PROCEDURE — D9220A PRA ANESTHESIA: Mod: ANES,,, | Performed by: ANESTHESIOLOGY

## 2022-11-29 PROCEDURE — 43235 PR EGD, FLEX, DIAGNOSTIC: ICD-10-PCS | Mod: GC,,, | Performed by: INTERNAL MEDICINE

## 2022-11-29 PROCEDURE — D9220A PRA ANESTHESIA: ICD-10-PCS | Mod: ANES,,, | Performed by: ANESTHESIOLOGY

## 2022-11-29 PROCEDURE — 25000003 PHARM REV CODE 250: Performed by: STUDENT IN AN ORGANIZED HEALTH CARE EDUCATION/TRAINING PROGRAM

## 2022-11-29 PROCEDURE — 25000003 PHARM REV CODE 250: Performed by: NURSE ANESTHETIST, CERTIFIED REGISTERED

## 2022-11-29 PROCEDURE — 99225 PR SUBSEQUENT OBSERVATION CARE,LEVEL II: CPT | Mod: ,,, | Performed by: STUDENT IN AN ORGANIZED HEALTH CARE EDUCATION/TRAINING PROGRAM

## 2022-11-29 PROCEDURE — 85025 COMPLETE CBC W/AUTO DIFF WBC: CPT | Performed by: STUDENT IN AN ORGANIZED HEALTH CARE EDUCATION/TRAINING PROGRAM

## 2022-11-29 PROCEDURE — 99225 PR SUBSEQUENT OBSERVATION CARE,LEVEL II: ICD-10-PCS | Mod: ,,, | Performed by: STUDENT IN AN ORGANIZED HEALTH CARE EDUCATION/TRAINING PROGRAM

## 2022-11-29 PROCEDURE — 43235 EGD DIAGNOSTIC BRUSH WASH: CPT | Mod: GC,,, | Performed by: INTERNAL MEDICINE

## 2022-11-29 PROCEDURE — D9220A PRA ANESTHESIA: Mod: CRNA,,, | Performed by: NURSE ANESTHETIST, CERTIFIED REGISTERED

## 2022-11-29 PROCEDURE — C9113 INJ PANTOPRAZOLE SODIUM, VIA: HCPCS | Performed by: STUDENT IN AN ORGANIZED HEALTH CARE EDUCATION/TRAINING PROGRAM

## 2022-11-29 PROCEDURE — 37000009 HC ANESTHESIA EA ADD 15 MINS: Performed by: INTERNAL MEDICINE

## 2022-11-29 PROCEDURE — 25000003 PHARM REV CODE 250: Performed by: NURSE PRACTITIONER

## 2022-11-29 PROCEDURE — 96366 THER/PROPH/DIAG IV INF ADDON: CPT | Mod: 59

## 2022-11-29 PROCEDURE — 99214 PR OFFICE/OUTPT VISIT, EST, LEVL IV, 30-39 MIN: ICD-10-PCS | Mod: GC,,, | Performed by: STUDENT IN AN ORGANIZED HEALTH CARE EDUCATION/TRAINING PROGRAM

## 2022-11-29 PROCEDURE — 63600175 PHARM REV CODE 636 W HCPCS: Performed by: NURSE ANESTHETIST, CERTIFIED REGISTERED

## 2022-11-29 PROCEDURE — D9220A PRA ANESTHESIA: ICD-10-PCS | Mod: CRNA,,, | Performed by: NURSE ANESTHETIST, CERTIFIED REGISTERED

## 2022-11-29 PROCEDURE — 37000008 HC ANESTHESIA 1ST 15 MINUTES: Performed by: INTERNAL MEDICINE

## 2022-11-29 PROCEDURE — 25000003 PHARM REV CODE 250: Performed by: EMERGENCY MEDICINE

## 2022-11-29 PROCEDURE — G0378 HOSPITAL OBSERVATION PER HR: HCPCS

## 2022-11-29 PROCEDURE — 36415 COLL VENOUS BLD VENIPUNCTURE: CPT | Performed by: STUDENT IN AN ORGANIZED HEALTH CARE EDUCATION/TRAINING PROGRAM

## 2022-11-29 PROCEDURE — 94761 N-INVAS EAR/PLS OXIMETRY MLT: CPT

## 2022-11-29 PROCEDURE — 43235 EGD DIAGNOSTIC BRUSH WASH: CPT | Performed by: INTERNAL MEDICINE

## 2022-11-29 RX ORDER — FUROSEMIDE 20 MG/1
20 TABLET ORAL DAILY
Status: DISCONTINUED | OUTPATIENT
Start: 2022-11-29 | End: 2022-11-30 | Stop reason: HOSPADM

## 2022-11-29 RX ORDER — POTASSIUM CHLORIDE 20 MEQ/1
40 TABLET, EXTENDED RELEASE ORAL ONCE
Status: COMPLETED | OUTPATIENT
Start: 2022-11-29 | End: 2022-11-29

## 2022-11-29 RX ORDER — PROPOFOL 10 MG/ML
VIAL (ML) INTRAVENOUS
Status: DISCONTINUED | OUTPATIENT
Start: 2022-11-29 | End: 2022-11-29

## 2022-11-29 RX ORDER — LIDOCAINE 50 MG/G
2 PATCH TOPICAL
Status: DISCONTINUED | OUTPATIENT
Start: 2022-11-30 | End: 2022-11-30 | Stop reason: HOSPADM

## 2022-11-29 RX ORDER — SPIRONOLACTONE 25 MG/1
50 TABLET ORAL DAILY
Status: DISCONTINUED | OUTPATIENT
Start: 2022-11-29 | End: 2022-11-30 | Stop reason: HOSPADM

## 2022-11-29 RX ORDER — LIDOCAINE HYDROCHLORIDE 20 MG/ML
INJECTION, SOLUTION EPIDURAL; INFILTRATION; INTRACAUDAL; PERINEURAL
Status: DISCONTINUED | OUTPATIENT
Start: 2022-11-29 | End: 2022-11-29

## 2022-11-29 RX ORDER — PROPOFOL 10 MG/ML
VIAL (ML) INTRAVENOUS CONTINUOUS PRN
Status: DISCONTINUED | OUTPATIENT
Start: 2022-11-29 | End: 2022-11-29

## 2022-11-29 RX ORDER — METHOCARBAMOL 750 MG/1
750 TABLET, FILM COATED ORAL ONCE
Status: COMPLETED | OUTPATIENT
Start: 2022-11-30 | End: 2022-11-30

## 2022-11-29 RX ORDER — ONDANSETRON 2 MG/ML
4 INJECTION INTRAMUSCULAR; INTRAVENOUS ONCE AS NEEDED
Status: DISCONTINUED | OUTPATIENT
Start: 2022-11-29 | End: 2022-11-29 | Stop reason: HOSPADM

## 2022-11-29 RX ADMIN — SPIRONOLACTONE 50 MG: 25 TABLET, FILM COATED ORAL at 03:11

## 2022-11-29 RX ADMIN — CARVEDILOL 12.5 MG: 12.5 TABLET, FILM COATED ORAL at 08:11

## 2022-11-29 RX ADMIN — PANTOPRAZOLE SODIUM 40 MG: 40 INJECTION, POWDER, FOR SOLUTION INTRAVENOUS at 11:11

## 2022-11-29 RX ADMIN — LACTULOSE 20 G: 20 SOLUTION ORAL at 08:11

## 2022-11-29 RX ADMIN — FUROSEMIDE 20 MG: 20 TABLET ORAL at 03:11

## 2022-11-29 RX ADMIN — RIFAXIMIN 550 MG: 550 TABLET ORAL at 11:11

## 2022-11-29 RX ADMIN — GLYCOPYRROLATE 0.2 MG: 0.2 INJECTION, SOLUTION INTRAMUSCULAR; INTRAVITREAL at 01:11

## 2022-11-29 RX ADMIN — PANTOPRAZOLE SODIUM 40 MG: 40 INJECTION, POWDER, FOR SOLUTION INTRAVENOUS at 09:11

## 2022-11-29 RX ADMIN — LACTULOSE 20 G: 20 SOLUTION ORAL at 09:11

## 2022-11-29 RX ADMIN — SODIUM CHLORIDE: 0.9 INJECTION, SOLUTION INTRAVENOUS at 01:11

## 2022-11-29 RX ADMIN — POTASSIUM CHLORIDE 40 MEQ: 1500 TABLET, EXTENDED RELEASE ORAL at 03:11

## 2022-11-29 RX ADMIN — OXYCODONE 5 MG: 5 TABLET ORAL at 11:11

## 2022-11-29 RX ADMIN — RIFAXIMIN 550 MG: 550 TABLET ORAL at 08:11

## 2022-11-29 RX ADMIN — LACTULOSE 20 G: 20 SOLUTION ORAL at 03:11

## 2022-11-29 RX ADMIN — FOLIC ACID 1 MG: 1 TABLET ORAL at 11:11

## 2022-11-29 RX ADMIN — OXYCODONE 5 MG: 5 TABLET ORAL at 08:11

## 2022-11-29 RX ADMIN — ACAMPROSATE CALCIUM 666 MG: 333 TABLET, DELAYED RELEASE ORAL at 11:11

## 2022-11-29 RX ADMIN — PROPOFOL 50 MG: 10 INJECTION, EMULSION INTRAVENOUS at 01:11

## 2022-11-29 RX ADMIN — AMLODIPINE BESYLATE 5 MG: 5 TABLET ORAL at 11:11

## 2022-11-29 RX ADMIN — CEFTRIAXONE 2 G: 2 INJECTION, SOLUTION INTRAVENOUS at 11:11

## 2022-11-29 RX ADMIN — Medication 100 MG: at 11:11

## 2022-11-29 RX ADMIN — PROPOFOL 100 MG: 10 INJECTION, EMULSION INTRAVENOUS at 01:11

## 2022-11-29 RX ADMIN — ACAMPROSATE CALCIUM 666 MG: 333 TABLET, DELAYED RELEASE ORAL at 03:11

## 2022-11-29 RX ADMIN — NORTRIPTYLINE HYDROCHLORIDE 25 MG: 25 CAPSULE ORAL at 08:11

## 2022-11-29 RX ADMIN — LIDOCAINE HYDROCHLORIDE 100 MG: 20 INJECTION, SOLUTION EPIDURAL; INFILTRATION; INTRACAUDAL; PERINEURAL at 01:11

## 2022-11-29 RX ADMIN — Medication 200 MCG/KG/MIN: at 01:11

## 2022-11-29 RX ADMIN — CARVEDILOL 12.5 MG: 12.5 TABLET, FILM COATED ORAL at 11:11

## 2022-11-29 NOTE — SUBJECTIVE & OBJECTIVE
Review of Systems   Constitutional:  Positive for appetite change. Negative for activity change and fever.   HENT:  Negative for congestion and trouble swallowing.    Eyes:  Negative for visual disturbance.   Respiratory:  Negative for cough and shortness of breath.    Cardiovascular:  Negative for leg swelling.   Gastrointestinal:  Positive for blood in stool. Negative for abdominal pain, diarrhea, nausea and vomiting.   Genitourinary:  Negative for dysuria.   Skin:  Negative for wound.   Neurological:  Negative for weakness and headaches.   Psychiatric/Behavioral:  Negative for confusion and hallucinations.      Past Medical History:   Diagnosis Date    Alcoholic cirrhosis of liver     Arthritis     ATN (acute tubular necrosis)     CHF (congestive heart failure)     Diverticulitis 01/2020    Esophageal varices     GERD (gastroesophageal reflux disease)     GI bleed     Hip arthritis     Left    Hypertension     Liver cirrhosis     Macrocytic anemia     Pulmonary embolism 08/2018    Unprovoked DVT.  Stop Coumadin due to GIB    Thrombocytopenia        Past Surgical History:   Procedure Laterality Date    ANKLE SURGERY      BLOCK, NERVE, PERIPHERAL Left 06/24/2022    Procedure: Left Hip femoral-obturator accessory nerve block;  Surgeon: Robbin De La Garza DO;  Location: [facility];  Service: Pain Management;  Laterality: Left;    COLON SURGERY  2007    COLONOSCOPY  09/05/2019    [facility]    COLONOSCOPY N/A 02/17/2021    Procedure: COLONOSCOPY;  Surgeon: Renea Billingsley MD;  Location: [facility];  Service: Endoscopy;  Laterality: N/A;    COLONOSCOPY W/ BIOPSIES  02/17/2021    ESOPHAGOGASTRODUODENOSCOPY N/A 07/26/2019    Procedure: EGD (ESOPHAGOGASTRODUODENOSCOPY);  Surgeon: Brian Trivedi MD;  Location: [facility];  Service: Endoscopy;  Laterality: N/A;    ESOPHAGOGASTRODUODENOSCOPY N/A 04/08/2020    Procedure: EGD (ESOPHAGOGASTRODUODENOSCOPY);  Surgeon: Donn Acuna MD;  Location: [facility];  Service: Endoscopy;   Laterality: N/A;    ESOPHAGOGASTRODUODENOSCOPY N/A 2021    Procedure: EGD (ESOPHAGOGASTRODUODENOSCOPY)- coffee ground emesis, hx varices;  Surgeon: Steve Chairez MD;  Location: [facility];  Service: Endoscopy;  Laterality: N/A;    ESOPHAGOGASTRODUODENOSCOPY N/A 2021    Procedure: EGD (ESOPHAGOGASTRODUODENOSCOPY);  Surgeon: Jeanmarie Ngo MD;  Location: [facility];  Service: Endoscopy;  Laterality: N/A;    ESOPHAGOGASTRODUODENOSCOPY N/A 2021    Procedure: ESOPHAGOGASTRODUODENOSCOPY (EGD);  Surgeon: Claudio Medeiros MD;  Location: [facility];  Service: Endoscopy;  Laterality: N/A;    ESOPHAGOGASTRODUODENOSCOPY  2021    ESOPHAGOGASTRODUODENOSCOPY N/A 2021    Procedure: EGD (ESOPHAGOGASTRODUODENOSCOPY);  Surgeon: Ajay Jackson MD;  Location: [facility];  Service: Endoscopy;  Laterality: N/A;    ESOPHAGOGASTRODUODENOSCOPY N/A 2022    Procedure: EGD (ESOPHAGOGASTRODUODENOSCOPY);  Surgeon: Brina Trivedi MD;  Location: [facility];  Service: Endoscopy;  Laterality: N/A;    ESOPHAGOGASTRODUODENOSCOPY N/A 2022    Procedure: EGD (ESOPHAGOGASTRODUODENOSCOPY);  Surgeon: Ajay Jackson MD;  Location: [facility];  Service: Endoscopy;  Laterality: N/A;    HERNIA REPAIR Left     Inguinal    UPPER GASTROINTESTINAL ENDOSCOPY N/A 2022     Review of patient's allergies indicates:   Allergen Reactions    Ciprofloxacin hcl Hallucinations    Meperidine Hives     Pt medicated w/multiple medications (demerol, protonix, and zofran)  w/i 10 mins time frame prior to developing localized hives near IV site that med was administered. Pt reports he has/takes all other medications on daily basis.     Morphine Itching    Nsaids (non-steroidal anti-inflammatory drug)      Kidney Disease    Tylenol [acetaminophen]      Hx of liver disease         Tobacco Use    Smoking status: Former     Types: Cigarettes     Quit date: 2000     Years since quittin.9    Smokeless tobacco: Never     Tobacco comments:     quit 20 years ago   Substance and Sexual Activity    Alcohol use: Not Currently     Comment: freq    Drug use: Yes     Types: Marijuana     Comment: occasionally    Sexual activity: Yes     Comment: occ       Medications Prior to Admission   Medication Sig Dispense Refill Last Dose    acamprosate (CAMPRAL) 333 mg tablet Take 2 tablets (666 mg total) by mouth 3 (three) times daily. 180 tablet 11     amLODIPine (NORVASC) 5 MG tablet Take 1 tablet (5 mg total) by mouth once daily. 30 tablet 2     augmented betamethasone dipropionate (DIPROLENE-AF) 0.05 % cream Apply topically 2 (two) times daily. 50 g 2     carvediloL (COREG) 12.5 MG tablet Take 1 tablet (12.5 mg total) by mouth 2 (two) times daily with meals. 60 tablet 11     clindamycin-benzoyl peroxide (BENZACLIN) gel Apply topically 2 (two) times daily. 50 g 1     famotidine (PEPCID) 20 MG tablet 1 tablet at bedtime as needed       ferrous gluconate (FERGON) 240 (27 FE) MG tablet Take 2 tablets (480 mg total) by mouth 2 (two) times daily with meals. (Patient taking differently: Take 480 mg by mouth 2 (two) times daily with meals. Takes PRN) 120 tablet 3     folic acid (FOLVITE) 1 MG tablet Take 1 tablet (1 mg total) by mouth once daily. 30 tablet 5     lactulose (CHRONULAC) 10 gram/15 mL solution Take 30 mLs (20 g total) by mouth 3 (three) times daily. TITRATE TO 3-4 BOWEL MOVEMENTS DAILY. 2700 mL 2     nortriptyline (PAMELOR) 25 MG capsule Take 1 capsule (25 mg total) by mouth every evening. 30 capsule 3     oxyCODONE (ROXICODONE) 5 MG immediate release tablet Take 1 tablet (5 mg total) by mouth 2 (two) times daily as needed for Pain. 60 tablet 0     [START ON 12/13/2022] oxyCODONE (ROXICODONE) 5 MG immediate release tablet Take 1 tablet (5 mg total) by mouth 2 (two) times daily as needed for Pain. (Patient not taking: Reported on 11/7/2022) 60 tablet 0     [START ON 1/12/2023] oxyCODONE (ROXICODONE) 5 MG immediate release tablet Take 1  tablet (5 mg total) by mouth 2 (two) times daily as needed for Pain. (Patient not taking: Reported on 11/7/2022) 60 tablet 0     pantoprazole (PROTONIX) 40 MG tablet Take 1 tablet (40 mg total) by mouth 2 (two) times daily. 60 tablet 11     rifAXIMin (XIFAXAN) 550 mg Tab Take 1 tablet (550 mg total) by mouth 2 (two) times daily. 60 tablet 11     sildenafiL (VIAGRA) 100 MG tablet Take 1 tablet (100 mg total) by mouth daily as needed for Erectile Dysfunction. 30 tablet 5     thiamine 100 MG tablet Take 1 tablet (100 mg total) by mouth once daily. 30 tablet 5        Objective:     Vital Signs (Most Recent):  Temp: 98.1 °F (36.7 °C) (11/29/22 0741)  Pulse: 86 (11/29/22 0741)  Resp: 18 (11/29/22 0741)  BP: 136/75 (11/29/22 0741)  SpO2: 97 % (11/29/22 0741) Vital Signs (24h Range):  Temp:  [98.1 °F (36.7 °C)-99.3 °F (37.4 °C)] 98.1 °F (36.7 °C)  Pulse:  [] 86  Resp:  [18-20] 18  SpO2:  [97 %-100 %] 97 %  BP: (136-199)/() 136/75     Weight: 90.7 kg (199 lb 15.3 oz) (11/28/22 1923)  Body mass index is 27.12 kg/m².    Physical Exam  Vitals and nursing note reviewed.   Constitutional:       General: He is not in acute distress.     Appearance: Normal appearance. He is not ill-appearing.   HENT:      Head: Normocephalic and atraumatic.   Eyes:      Extraocular Movements: Extraocular movements intact.      Conjunctiva/sclera: Conjunctivae normal.   Cardiovascular:      Rate and Rhythm: Normal rate and regular rhythm.      Heart sounds: Normal heart sounds.   Pulmonary:      Effort: Pulmonary effort is normal. No respiratory distress.      Breath sounds: Normal breath sounds.   Abdominal:      General: Bowel sounds are normal.      Palpations: Abdomen is soft.      Tenderness: There is no abdominal tenderness. There is no guarding or rebound.   Musculoskeletal:      Cervical back: Normal range of motion and neck supple.   Skin:     General: Skin is warm and dry.      Capillary Refill: Capillary refill takes less than  2 seconds.   Neurological:      General: No focal deficit present.      Mental Status: He is alert.   Psychiatric:         Mood and Affect: Mood normal.         Behavior: Behavior normal.         Thought Content: Thought content normal.         Judgment: Judgment normal.       MELD-Na score: 23 at 11/29/2022  5:54 AM  MELD score: 22 at 11/29/2022  5:54 AM  Calculated from:  Serum Creatinine: 1.9 mg/dL at 11/29/2022  5:54 AM  Serum Sodium: 136 mmol/L at 11/29/2022  5:54 AM  Total Bilirubin: 3.5 mg/dL at 11/29/2022  5:54 AM  INR(ratio): 1.5 at 11/29/2022  5:54 AM  Age: 47 years    Significant Labs:  Labs within the past month have been reviewed.  Recent Labs   Lab 11/28/22  0925 11/29/22  0554    136   K 3.7 3.4*    105   CO2 21* 20*   BUN 17 18   CREATININE 1.9* 1.9*   * 90   CALCIUM 9.3 8.6*   MG  --  1.9     Recent Labs   Lab 11/25/22  1007 11/28/22  0925 11/28/22  1416 11/29/22  0554   ALKPHOS 125 166*  --  133   ALT 25 27  --  23   AST 54* 57*  --  51*   ALBUMIN 2.6* 3.2*  --  2.7*   PROT 7.4 9.4*  --  7.6   BILITOT 3.1* 5.0*  --  3.5*   INR 1.4*  --  1.4* 1.5*     Recent Labs   Lab 11/25/22  1007 11/28/22  0925 11/29/22  0554   WBC 4.91 7.13 7.02   HGB 8.6* 11.3* 9.2*   HCT 27.0* 34.9* 27.7*   PLT 90* 103* 75*       Significant Imaging:  CT: Reviewed  Nonspecific diffuse edematous wall thickening of the stomach, proximal duodenum, and proximal colon. Findings may represent sequela of portal gastropathy and enterocolopathy.

## 2022-11-29 NOTE — ASSESSMENT & PLAN NOTE
Pt is a 46 yo M with known history of alcoholic cirrhosis previously evaluated and denied for liver transplant. Hepatology consulted for management of cirrhosis. Pt states that he has not had a drink in the past 3 months. No abdominal tenderness. Confusion improved. Abdominal distension at baseline per patient.       MELD-Na score: 23 at 11/29/2022  5:54 AM  MELD score: 22 at 11/29/2022  5:54 AM  Calculated from:  Serum Creatinine: 1.9 mg/dL at 11/29/2022  5:54 AM  Serum Sodium: 136 mmol/L at 11/29/2022  5:54 AM  Total Bilirubin: 3.5 mg/dL at 11/29/2022  5:54 AM  INR(ratio): 1.5 at 11/29/2022  5:54 AM  Age: 47 years    Recommendations:     -Pt takes Lasix 20 and Spironolactone 50 at home for ascites management. Recommend restarting as patient has small volume ascites noted on CT scan.    -Continue Lactulose TID and Rifaximin BID for encephalopathy management; titrate Lactulose to 3 bowel movements daily.   -CMP and INR daily.   -Pt with likely CKD and has upcoming nephrology appointment, Cr appears at baseline.   -Avoid use of medications that may precipitate encephalopathy (e.g. opioids and benzo's).

## 2022-11-29 NOTE — HPI
Irvin Diaz is a 46 yo M with PMH of alcoholic cirrhosis, HTN, iron deficiency anemia who presented to the ED with a several day history of abdominal pain and decreased PO intake + a 1d history of melena and bloody stools. He reports that he intermittently has GI bleeding historically with upper (varices) and lower (polyps) bleeding. He had one episode of non bloody non bilious emesis. Denies missing any doses of medications. Has not had fevers, URI sx. Does report some hallucinations, which have since resolved with lactulose. Improved when he started taking lactulose again. Has not had alcohol in 3 months, states that his last drink was September 10th.    The patient follows with Dr. Arreaga and was last seen 10/25. Patient was previously evaluated for liver transplant however was denied.      In the ED he was afebrile, hypertensive. Given home dose of amlodipine, coreg. Labs with stable Hgb, Cr. Ammonia WNL. MELD 22. CT without obstruction, small volume ascites noted. Rectal exam with dark brown stool. Was given IV PPI, Rocephin, and admitted to the hospital medicine service for further management. Hepatology was consulted for management of cirrhosis in the setting of GI bleed.

## 2022-11-29 NOTE — TRANSFER OF CARE
Anesthesia Transfer of Care Note    Patient: Irvin Diaz     Procedure(s) Performed: Procedure(s) (LRB):  EGD (ESOPHAGOGASTRODUODENOSCOPY) (N/A)    Patient location: PACU    Anesthesia Type: general    Transport from OR: Transported from OR on room air with adequate spontaneous ventilation    Post pain: adequate analgesia    Post assessment: no apparent anesthetic complications    Post vital signs: stable    Level of consciousness: awake    Nausea/Vomiting: no nausea/vomiting    Complications: none    Transfer of care protocol was followed      Last vitals:   Visit Vitals  BP 83/53 (BP Location: Right arm, Patient Position: Lying)   Pulse 77   Temp 37.1 °C (98.8 °F) (Temporal)   Resp 16   Ht 6' (1.829 m)   Wt 90.7 kg (199 lb 15.3 oz)   SpO2 100%   BMI 27.12 kg/m²

## 2022-11-29 NOTE — NURSING TRANSFER
Nursing Transfer Note      11/29/2022     Reason patient is being transferred: post procedure     Transfer To: 1157, pt returning, report called to bedside RN, Melissa, at the time of this note    Transfer via stretcher    Transported by PCT    Medicines sent: na    Any special needs or follow-up needed: routine    Chart send with patient: Yes    Patient reassessed at: 1345 on 11/29

## 2022-11-29 NOTE — PROGRESS NOTES
[facility] [address] [place] [address] Medicine  Progress Note    Patient Name: Irvin Diaz Jr.  MRN: [id number]  Patient Class: OP- Observation   Admission Date: 11/28/2022  Length of Stay: 0 days  Attending Physician: Keesha Martin MD  Primary Care Provider: Edouard Gibson MD        Subjective:     Principal Problem:Melena        HPI:  Irvin Diaz is a 47YOM with history of alcoholic cirrhosis, HTN, iron-deficiency anemia who presented to the ED with a several day history of abdominal pain and decreased PO intake + a 1d history of melena and bloody stools. He reports that he intermittently has GI bleeding historically with upper (varices) and lower (polyps) bleeding. He has had some non-bloody emesis. Denies missing any doses of medications. Has not had fevers, URI sx. Does report some hallucinations, which he associates with constipation. Improved when he started taking lactulose again. Has not had alcohol in 3.5 months.    In the ED he was afebrile, hypertensive. Given home dose of amlodipine, coreg. Labs with stable Hgb, Cr. Ammonia WNL. MELD 22. CT without obstruction. Rectal exam with dark brown stool. Was given IV PPI, Rocephin, and admitted to the hospital medicine service for further management.       Overview/Hospital Course:  No notes on file    Interval History: No bowel movements overnight. No significant abdominal pain or nausea. Had EGD today, tolerated well. Patient and family updated and bedside, questions answered.     Review of Systems   Constitutional:  Positive for appetite change. Negative for activity change and fever.   Eyes:  Negative for visual disturbance.   Respiratory:  Negative for cough and shortness of breath.    Cardiovascular:  Negative for leg swelling.   Gastrointestinal:  Positive for blood in stool. Negative for abdominal pain, diarrhea, nausea and vomiting.   Neurological:  Negative for weakness.   Psychiatric/Behavioral:  Negative for confusion.    Objective:      Vital Signs (Most Recent):  Temp: 98.1 °F (36.7 °C) (11/29/22 1551)  Pulse: 75 (11/29/22 1551)  Resp: 18 (11/29/22 1551)  BP: 135/77 (11/29/22 1551)  SpO2: 98 % (11/29/22 1551) Vital Signs (24h Range):  Temp:  [98 °F (36.7 °C)-99.9 °F (37.7 °C)] 98.1 °F (36.7 °C)  Pulse:  [] 75  Resp:  [12-20] 18  SpO2:  [96 %-100 %] 98 %  BP: ()/() 135/77     Weight: 90.7 kg (199 lb 15.3 oz)  Body mass index is 27.12 kg/m².    Intake/Output Summary (Last 24 hours) at 11/29/2022 1622  Last data filed at 11/29/2022 1333  Gross per 24 hour   Intake 610 ml   Output --   Net 610 ml      Physical Exam  Vitals and nursing note reviewed.   Constitutional:       General: He is not in acute distress.     Appearance: Normal appearance. He is not ill-appearing.   HENT:      Head: Normocephalic and atraumatic.   Eyes:      Extraocular Movements: Extraocular movements intact.      Conjunctiva/sclera: Conjunctivae normal.   Cardiovascular:      Rate and Rhythm: Normal rate and regular rhythm.      Heart sounds: Normal heart sounds.   Pulmonary:      Effort: Pulmonary effort is normal. No respiratory distress.      Breath sounds: Normal breath sounds.   Abdominal:      General: Bowel sounds are normal.      Palpations: Abdomen is soft.      Tenderness: There is abdominal tenderness (improved from previous exam). There is no guarding or rebound.   Musculoskeletal:      Cervical back: Normal range of motion and neck supple.   Skin:     General: Skin is warm and dry.      Capillary Refill: Capillary refill takes less than 2 seconds.   Neurological:      General: No focal deficit present.      Mental Status: He is alert.   Psychiatric:         Mood and Affect: Mood normal.         Behavior: Behavior normal.         Thought Content: Thought content normal.         Judgment: Judgment normal.       Significant Labs: All pertinent labs within the past 24 hours have been reviewed.    Significant Imaging: I have reviewed all pertinent  imaging results/findings within the past 24 hours.      Assessment/Plan:      * Melena  Continue IV PPI  Continue Rocephin ppx  GI consulted, appreciate recs  POAL  EKG 11/29 with portal HTN gastropathy w/o active bleeding, small varices  Repeat CBC in AM          Alcohol use disorder, severe, dependence  In remission, with no alcohol use reported in 2.5 months  PETH pending  Will monitor for s/s withdrawal    CKD (chronic kidney disease) stage 3, GFR 30-59 ml/min  Cr 1.9 on admission, at baseline  Continue to monitor and avoid nephrotoxic meds as able      Alcoholic cirrhosis of liver  Hepatology consulted  Admission MELD 22  Continue rifampin and lactulose  Continue lasix and spironolactone         Essential hypertension  Continue home coreg and amlodipine  Hydralazine PRN        VTE Risk Mitigation (From admission, onward)         Ordered     IP VTE LOW RISK PATIENT  Once         11/28/22 1520     Place sequential compression device  Until discontinued         11/28/22 1520                Discharge Planning   RADHA: 11/30/2022     Code Status: Full Code   Is the patient medically ready for discharge?:     Reason for patient still in hospital (select all that apply): Laboratory test and Consult recommendations  Discharge Plan A: Home                  May MIGUELITO Martin MD  Department of Hospital Medicine   [facility]

## 2022-11-29 NOTE — PLAN OF CARE
[facility]  Discharge Assessment    Primary Care Provider: Edouard Gibson MD     Discharge Assessment (most recent)       BRIEF DISCHARGE ASSESSMENT - 11/29/22 1046          Discharge Planning    Assessment Type Discharge Planning Brief Assessment     Resource/Environmental Concerns none     Support Systems Family members     Equipment Currently Used at Home cane, straight;walker, rolling;wheelchair     Current Living Arrangements home/apartment/condo     Patient/Family Anticipates Transition to home     Patient/Family Anticipated Services at Transition none     DME Needed Upon Discharge  none     Discharge Plan A Home     Discharge Plan B Home        Physical Activity    On average, how many days per week do you engage in moderate to strenuous exercise (like a brisk walk)? 0 days     On average, how many minutes do you engage in exercise at this level? 0 min        Financial Resource Strain    How hard is it for you to pay for the very basics like food, housing, medical care, and heating? Not very hard        Housing Stability    In the last 12 months, was there a time when you were not able to pay the mortgage or rent on time? No     In the last 12 months, how many places have you lived? 1     In the last 12 months, was there a time when you did not have a steady place to sleep or slept in a shelter (including now)? No        Transportation Needs    In the past 12 months, has lack of transportation kept you from medical appointments or from getting medications? No     In the past 12 months, has lack of transportation kept you from meetings, work, or from getting things needed for daily living? No        Food Insecurity    Within the past 12 months, you worried that your food would run out before you got the money to buy more. Never true     Within the past 12 months, the food you bought just didn't last and you didn't have money to get more. Never true        Stress    Do you feel stress -  tense, restless, nervous, or anxious, or unable to sleep at night because your mind is troubled all the time - these days? Not at all        Social Connections    In a typical week, how many times do you talk on the phone with family, friends, or neighbors? More than three times a week     How often do you get together with friends or relatives? More than three times a week     How often do you attend Roman Catholic or Anabaptism services? Never     Do you belong to any clubs or organizations such as Roman Catholic groups, unions, fraternal or athletic groups, or school groups? Yes     How often do you attend meetings of the clubs or organizations you belong to? Never     Are you , , , , never , or living with a partner?         Alcohol Use    Q1: How often do you have a drink containing alcohol? 2-3 times a week     Q2: How many drinks containing alcohol do you have on a typical day when you are drinking? 3 or 4     Q3: How often do you have six or more drinks on one occasion? Weekly                 Pt is a 47 y.o. male admitted with melena and has a PMH of alcoholic cirrhosis and HTN. He shares a 1 story house with 6 steps to enter. He is independent with his ADLs and IADLs. Pt is requesting OPCM.   [facility] Discharge Packet given to patient and/or family with understanding verbalized.   name and number and estimated discharge date written on white board in patient's room with request to call for any questions or concerns.  Will continue to follow for needs.  Hossein Gibson RN,BSN

## 2022-11-29 NOTE — INTERVAL H&P NOTE
The patient has been examined and the H&P has been reviewed:    Pre-Procedure H and P Addendum    Patient seen and examined.  History and exam unchanged from prior history and physical.  Melena and drop in hemoglobin since admit.     Procedure: EGD  Indication: Melena  ASA Class: per anesthesiology  Airway: normal  Neck Mobility: full range of motion  Mallampatti score: per anesthesia  History of anesthesia problems: no  Family history of anesthesia problems: no  Anesthesia Plan: MAC        Active Hospital Problems    Diagnosis  POA    *Melena [K92.1]  Unknown    Secondary esophageal varices without bleeding [I85.10]  Yes    Alcohol use disorder, severe, dependence [F10.20]  Yes     Chronic    CKD (chronic kidney disease) stage 3, GFR 30-59 ml/min [N18.30]  Yes    Essential hypertension [I10]  Yes     Chronic    Alcoholic cirrhosis of liver [K70.30]  Yes      Resolved Hospital Problems   No resolved problems to display.

## 2022-11-29 NOTE — PLAN OF CARE
Problem: Adult Inpatient Plan of Care  Goal: Plan of Care Review  Outcome: Ongoing, Progressing  Goal: Patient-Specific Goal (Individualized)  Outcome: Ongoing, Progressing  Goal: Absence of Hospital-Acquired Illness or Injury  Outcome: Ongoing, Progressing  Goal: Optimal Comfort and Wellbeing  Outcome: Ongoing, Progressing  Goal: Readiness for Transition of Care  Outcome: Ongoing, Progressing     Problem: Adjustment to Illness (Sepsis/Septic Shock)  Goal: Optimal Coping  Outcome: Ongoing, Progressing     Problem: Bleeding (Sepsis/Septic Shock)  Goal: Absence of Bleeding  Outcome: Ongoing, Progressing     Problem: Glycemic Control Impaired (Sepsis/Septic Shock)  Goal: Blood Glucose Level Within Desired Range  Outcome: Ongoing, Progressing     Problem: Infection Progression (Sepsis/Septic Shock)  Goal: Absence of Infection Signs and Symptoms  Outcome: Ongoing, Progressing     Problem: Nutrition Impaired (Sepsis/Septic Shock)  Goal: Optimal Nutrition Intake  Outcome: Ongoing, Progressing     Problem: Fluid and Electrolyte Imbalance (Acute Kidney Injury/Impairment)  Goal: Fluid and Electrolyte Balance  Outcome: Ongoing, Progressing     Problem: Oral Intake Inadequate (Acute Kidney Injury/Impairment)  Goal: Optimal Nutrition Intake  Outcome: Ongoing, Progressing     Problem: Renal Function Impairment (Acute Kidney Injury/Impairment)  Goal: Effective Renal Function  Outcome: Ongoing, Progressing

## 2022-11-29 NOTE — TREATMENT PLAN
GI Post-Procedure Treatment Plan    EGD complete    Impression:            - Small (< 5 mm) esophageal varices with no                          bleeding and no stigmata of recent bleeding.                          - Portal hypertensive gastropathy.                          - One gastric polyp.                          - Normal examined duodenum.                          - No specimens collected.     Recommendation:        - Return patient to [facility] for observation.                          - Resume previous diet.                          - Continue present medications.     Would observe overnight, if H/H and stools stable, can discharge and undergo colonoscopy as outpatient given history of partial colectomy. If ongoing bleeding, would consider as inpatient.    Tan Braun  Gastroenterology Fellow, PGY-IV

## 2022-11-29 NOTE — CONSULTS
[facility]  Hepatology  Consult Note    Patient Name: Irvin Diaz Jr.  MRN: [id number]  Admission Date: 11/28/2022  Hospital Length of Stay: 0 days  Attending Provider: Keesha Martin MD   Primary Care Physician: Edouard Gibson MD  Principal Problem:Melena    Inpatient consult to Hepatology  Consult performed by: Mel Sanchez MD  Consult ordered by: Keesha Martin MD  Reason for consult: cirrhosis managmenet         Subjective:     Transplant status: No    HPI:  Irvin Diaz is a 48 yo M with PMH of alcoholic cirrhosis, HTN, iron deficiency anemia who presented to the ED with a several day history of abdominal pain and decreased PO intake + a 1d history of melena and bloody stools. He reports that he intermittently has GI bleeding historically with upper (varices) and lower (polyps) bleeding. He had one episode of non bloody non bilious emesis. Denies missing any doses of medications. Has not had fevers, URI sx. Does report some hallucinations, which have since resolved with lactulose. Improved when he started taking lactulose again. Has not had alcohol in 3 months, states that his last drink was September 10th.    The patient follows with Dr. Arreaga and was last seen 10/25. Patient was previously evaluated for liver transplant however was denied.      In the ED he was afebrile, hypertensive. Given home dose of amlodipine, coreg. Labs with stable Hgb, Cr. Ammonia WNL. MELD 22. CT without obstruction, small volume ascites noted. Rectal exam with dark brown stool. Was given IV PPI, Rocephin, and admitted to the hospital medicine service for further management. Hepatology was consulted for management of cirrhosis in the setting of GI bleed.       Review of Systems   Constitutional:  Positive for appetite change. Negative for activity change and fever.   HENT:  Negative for congestion and trouble swallowing.    Eyes:  Negative for visual disturbance.   Respiratory:  Negative  for cough and shortness of breath.    Cardiovascular:  Negative for leg swelling.   Gastrointestinal:  Positive for blood in stool. Negative for abdominal pain, diarrhea, nausea and vomiting.   Genitourinary:  Negative for dysuria.   Skin:  Negative for wound.   Neurological:  Negative for weakness and headaches.   Psychiatric/Behavioral:  Negative for confusion and hallucinations.      Past Medical History:   Diagnosis Date    Alcoholic cirrhosis of liver     Arthritis     ATN (acute tubular necrosis)     CHF (congestive heart failure)     Diverticulitis 01/2020    Esophageal varices     GERD (gastroesophageal reflux disease)     GI bleed     Hip arthritis     Left    Hypertension     Liver cirrhosis     Macrocytic anemia     Pulmonary embolism 08/2018    Unprovoked DVT.  Stop Coumadin due to GIB    Thrombocytopenia        Past Surgical History:   Procedure Laterality Date    ANKLE SURGERY      BLOCK, NERVE, PERIPHERAL Left 06/24/2022    Procedure: Left Hip femoral-obturator accessory nerve block;  Surgeon: Robbin De La Garza DO;  Location: [facility];  Service: Pain Management;  Laterality: Left;    COLON SURGERY  2007    COLONOSCOPY  09/05/2019    [facility]    COLONOSCOPY N/A 02/17/2021    Procedure: COLONOSCOPY;  Surgeon: Renea Billingsley MD;  Location[facility];  Service: Endoscopy;  Laterality: N/A;    COLONOSCOPY W/ BIOPSIES  02/17/2021    ESOPHAGOGASTRODUODENOSCOPY N/A 07/26/2019    Procedure: EGD (ESOPHAGOGASTRODUODENOSCOPY);  Surgeon: Brian Trivedi MD;  Location: [facility];  Service: Endoscopy;  Laterality: N/A;    ESOPHAGOGASTRODUODENOSCOPY N/A 04/08/2020    Procedure: EGD (ESOPHAGOGASTRODUODENOSCOPY);  Surgeon: Donn Acuna MD;  Location: [facility];  Service: Endoscopy;  Laterality: N/A;    ESOPHAGOGASTRODUODENOSCOPY N/A 01/03/2021    Procedure: EGD (ESOPHAGOGASTRODUODENOSCOPY)- coffee ground emesis, hx varices;  Surgeon: Steve Chairez MD;  Location: [facility];  Service:  Endoscopy;  Laterality: N/A;    ESOPHAGOGASTRODUODENOSCOPY N/A 2021    Procedure: EGD (ESOPHAGOGASTRODUODENOSCOPY);  Surgeon: Jeanmarie Ngo MD;  Location[facility];  Service: Endoscopy;  Laterality: N/A;    ESOPHAGOGASTRODUODENOSCOPY N/A 2021    Procedure: ESOPHAGOGASTRODUODENOSCOPY (EGD);  Surgeon: Claudio Medeiros MD;  Location: [facility];  Service: Endoscopy;  Laterality: N/A;    ESOPHAGOGASTRODUODENOSCOPY  2021    ESOPHAGOGASTRODUODENOSCOPY N/A 2021    Procedure: EGD (ESOPHAGOGASTRODUODENOSCOPY);  Surgeon: Ajay Jackson MD;  Location: [facility];  Service: Endoscopy;  Laterality: N/A;    ESOPHAGOGASTRODUODENOSCOPY N/A 2022    Procedure: EGD (ESOPHAGOGASTRODUODENOSCOPY);  Surgeon: Brian Trivedi MD;  Location[facility];  Service: Endoscopy;  Laterality: N/A;    ESOPHAGOGASTRODUODENOSCOPY N/A 2022    Procedure: EGD (ESOPHAGOGASTRODUODENOSCOPY);  Surgeon: Ajay Jackson MD;  Location: [facility];  Service: Endoscopy;  Laterality: N/A;    HERNIA REPAIR Left     Inguinal    UPPER GASTROINTESTINAL ENDOSCOPY N/A 2022     Review of patient's allergies indicates:   Allergen Reactions    Ciprofloxacin hcl Hallucinations    Meperidine Hives     Pt medicated w/multiple medications (demerol, protonix, and zofran)  w/i 10 mins time frame prior to developing localized hives near IV site that med was administered. Pt reports he has/takes all other medications on daily basis.     Morphine Itching    Nsaids (non-steroidal anti-inflammatory drug)      Kidney Disease    Tylenol [acetaminophen]      Hx of liver disease         Tobacco Use    Smoking status: Former     Types: Cigarettes     Quit date: 2000     Years since quittin.9    Smokeless tobacco: Never    Tobacco comments:     quit 20 years ago   Substance and Sexual Activity    Alcohol use: Not Currently     Comment: freq    Drug use: Yes     Types: Marijuana     Comment: occasionally    Sexual  activity: Yes     Comment: occ       Medications Prior to Admission   Medication Sig Dispense Refill Last Dose    acamprosate (CAMPRAL) 333 mg tablet Take 2 tablets (666 mg total) by mouth 3 (three) times daily. 180 tablet 11     amLODIPine (NORVASC) 5 MG tablet Take 1 tablet (5 mg total) by mouth once daily. 30 tablet 2     augmented betamethasone dipropionate (DIPROLENE-AF) 0.05 % cream Apply topically 2 (two) times daily. 50 g 2     carvediloL (COREG) 12.5 MG tablet Take 1 tablet (12.5 mg total) by mouth 2 (two) times daily with meals. 60 tablet 11     clindamycin-benzoyl peroxide (BENZACLIN) gel Apply topically 2 (two) times daily. 50 g 1     famotidine (PEPCID) 20 MG tablet 1 tablet at bedtime as needed       ferrous gluconate (FERGON) 240 (27 FE) MG tablet Take 2 tablets (480 mg total) by mouth 2 (two) times daily with meals. (Patient taking differently: Take 480 mg by mouth 2 (two) times daily with meals. Takes PRN) 120 tablet 3     folic acid (FOLVITE) 1 MG tablet Take 1 tablet (1 mg total) by mouth once daily. 30 tablet 5     lactulose (CHRONULAC) 10 gram/15 mL solution Take 30 mLs (20 g total) by mouth 3 (three) times daily. TITRATE TO 3-4 BOWEL MOVEMENTS DAILY. 2700 mL 2     nortriptyline (PAMELOR) 25 MG capsule Take 1 capsule (25 mg total) by mouth every evening. 30 capsule 3     oxyCODONE (ROXICODONE) 5 MG immediate release tablet Take 1 tablet (5 mg total) by mouth 2 (two) times daily as needed for Pain. 60 tablet 0     [START ON 12/13/2022] oxyCODONE (ROXICODONE) 5 MG immediate release tablet Take 1 tablet (5 mg total) by mouth 2 (two) times daily as needed for Pain. (Patient not taking: Reported on 11/7/2022) 60 tablet 0     [START ON 1/12/2023] oxyCODONE (ROXICODONE) 5 MG immediate release tablet Take 1 tablet (5 mg total) by mouth 2 (two) times daily as needed for Pain. (Patient not taking: Reported on 11/7/2022) 60 tablet 0     pantoprazole (PROTONIX) 40 MG tablet Take 1 tablet (40  mg total) by mouth 2 (two) times daily. 60 tablet 11     rifAXIMin (XIFAXAN) 550 mg Tab Take 1 tablet (550 mg total) by mouth 2 (two) times daily. 60 tablet 11     sildenafiL (VIAGRA) 100 MG tablet Take 1 tablet (100 mg total) by mouth daily as needed for Erectile Dysfunction. 30 tablet 5     thiamine 100 MG tablet Take 1 tablet (100 mg total) by mouth once daily. 30 tablet 5        Objective:     Vital Signs (Most Recent):  Temp: 98.1 °F (36.7 °C) (11/29/22 0741)  Pulse: 86 (11/29/22 0741)  Resp: 18 (11/29/22 0741)  BP: 136/75 (11/29/22 0741)  SpO2: 97 % (11/29/22 0741) Vital Signs (24h Range):  Temp:  [98.1 °F (36.7 °C)-99.3 °F (37.4 °C)] 98.1 °F (36.7 °C)  Pulse:  [] 86  Resp:  [18-20] 18  SpO2:  [97 %-100 %] 97 %  BP: (136-199)/() 136/75     Weight: 90.7 kg (199 lb 15.3 oz) (11/28/22 1923)  Body mass index is 27.12 kg/m².    Physical Exam  Vitals and nursing note reviewed.   Constitutional:       General: He is not in acute distress.     Appearance: Normal appearance. He is not ill-appearing.   HENT:      Head: Normocephalic and atraumatic.   Eyes:      Extraocular Movements: Extraocular movements intact.      Conjunctiva/sclera: Conjunctivae normal.   Cardiovascular:      Rate and Rhythm: Normal rate and regular rhythm.      Heart sounds: Normal heart sounds.   Pulmonary:      Effort: Pulmonary effort is normal. No respiratory distress.      Breath sounds: Normal breath sounds.   Abdominal:      General: Bowel sounds are normal.      Palpations: Abdomen is soft.      Tenderness: There is no abdominal tenderness. There is no guarding or rebound.   Musculoskeletal:      Cervical back: Normal range of motion and neck supple.   Skin:     General: Skin is warm and dry.      Capillary Refill: Capillary refill takes less than 2 seconds.   Neurological:      General: No focal deficit present.      Mental Status: He is alert.   Psychiatric:         Mood and Affect: Mood normal.         Behavior: Behavior  normal.         Thought Content: Thought content normal.         Judgment: Judgment normal.       MELD-Na score: 23 at 11/29/2022  5:54 AM  MELD score: 22 at 11/29/2022  5:54 AM  Calculated from:  Serum Creatinine: 1.9 mg/dL at 11/29/2022  5:54 AM  Serum Sodium: 136 mmol/L at 11/29/2022  5:54 AM  Total Bilirubin: 3.5 mg/dL at 11/29/2022  5:54 AM  INR(ratio): 1.5 at 11/29/2022  5:54 AM  Age: 47 years    Significant Labs:  Labs within the past month have been reviewed.  Recent Labs   Lab 11/28/22  0925 11/29/22  0554    136   K 3.7 3.4*    105   CO2 21* 20*   BUN 17 18   CREATININE 1.9* 1.9*   * 90   CALCIUM 9.3 8.6*   MG  --  1.9     Recent Labs   Lab 11/25/22  1007 11/28/22  0925 11/28/22  1416 11/29/22  0554   ALKPHOS 125 166*  --  133   ALT 25 27  --  23   AST 54* 57*  --  51*   ALBUMIN 2.6* 3.2*  --  2.7*   PROT 7.4 9.4*  --  7.6   BILITOT 3.1* 5.0*  --  3.5*   INR 1.4*  --  1.4* 1.5*     Recent Labs   Lab 11/25/22  1007 11/28/22  0925 11/29/22  0554   WBC 4.91 7.13 7.02   HGB 8.6* 11.3* 9.2*   HCT 27.0* 34.9* 27.7*   PLT 90* 103* 75*       Significant Imaging:  CT: Reviewed  Nonspecific diffuse edematous wall thickening of the stomach, proximal duodenum, and proximal colon. Findings may represent sequela of portal gastropathy and enterocolopathy.    Assessment/Plan:     Alcoholic cirrhosis of liver  Pt is a 46 yo M with known history of alcoholic cirrhosis previously evaluated and denied for liver transplant. Hepatology consulted for management of cirrhosis. Pt states that he has not had a drink in the past 3 months. No abdominal tenderness. Confusion improved. Abdominal distension at baseline per patient.       MELD-Na score: 23 at 11/29/2022  5:54 AM  MELD score: 22 at 11/29/2022  5:54 AM  Calculated from:  Serum Creatinine: 1.9 mg/dL at 11/29/2022  5:54 AM  Serum Sodium: 136 mmol/L at 11/29/2022  5:54 AM  Total Bilirubin: 3.5 mg/dL at 11/29/2022  5:54 AM  INR(ratio): 1.5 at 11/29/2022  5:54  AM  Age: 47 years    Recommendations:     -Pt takes Lasix 20 and Spironolactone 50 at home for ascites management. Recommend restarting as patient has small volume ascites noted on CT scan.    -Continue Lactulose TID and Rifaximin BID for encephalopathy management; titrate Lactulose to 3 bowel movements daily.   -CMP and INR daily.   -Pt with likely CKD and has upcoming nephrology appointment, Cr appears at baseline.   -Avoid use of medications that may precipitate encephalopathy (e.g. opioids and benzo's).         Patient liver disease appears to be stable. Has upcoming appointment with Dr. Arreaga.     Thank you for your consult. I will sign off. Please contact us if you have any additional questions.    Mel Sanchez MD  Hepatology  [facility] - Observation [facility]

## 2022-11-29 NOTE — ASSESSMENT & PLAN NOTE
Hepatology consulted  Admission MELD 22  Continue rifampin and lactulose  Continue lasix and spironolactone

## 2022-11-29 NOTE — ANESTHESIA PREPROCEDURE EVALUATION
11/29/2022  Irvin Diaz Jr. is a 47 y.o., male.      Pre-op Assessment    I have reviewed the Patient Summary Reports.     I have reviewed the Nursing Notes. I have reviewed the NPO Status.   I have reviewed the Medications.     Review of Systems  Anesthesia Hx:  Denies Family Hx of Anesthesia complications.   Denies Personal Hx of Anesthesia complications.   Social:  Former Smoker, Alcohol Use    Hematology/Oncology:     Oncology Normal    -- Anemia:   EENT/Dental:EENT/Dental Normal   Cardiovascular:   Hypertension CHF  Denies Cardiovascular Symptoms.  Denies Coronary Artery Disease.  Denies Valvular Heart Disease. Denies Congenital Heart Disease.    Denies Congenital Heart Disease.   Congestive Heart Failure (CHF)  Denies Deep Venous Thrombosis (DVT)  Hypertension    Pulmonary:  Pulmonary Normal  Denies Asthma.  Denies Chronic Obstructive Pulmonary Disease (COPD).  Denies Obstructive Sleep Apnea (RUPAL) Denies Possible Obstructive Sleep Apnea        Renal/:   Chronic Renal Disease, CRI  Kidney Function/Disease, Chronic Kidney Disease (CKD)    Hepatic/GI:   GERD Liver Disease, bleeding Liver Disease, Alcoholic Liver Disease , Cirrhosis    Musculoskeletal:  Musculoskeletal Normal  Denies Musculoskeletal General/Symptoms  Joint Disease:  hip Denies Cervical Spine Disorder    Neurological:  Neurology Normal  Denies Dx of Headaches Denies Seizure Disorder  Denies CVA - Cerebrovasular Accident  Denies TIA - Transient Ischemic Attack  Denies Movement Disorder Dx Denies Neuromuscular Disease   Endocrine:  Endocrine Normal  Denies Diabetes  Denies Thyroid Disease    Dermatological:  Skin Normal    Psych:  Psychiatric Normal           Physical Exam  General: Well nourished, Cooperative, Alert and Oriented    Airway:  Mallampati: I   Mouth Opening: Normal  TM Distance: Normal  Tongue: Normal  Neck ROM: Normal  ROM    Chest/Lungs:  Clear to auscultation, Normal Respiratory Rate    Heart:  Rate: Normal  Rhythm: Regular Rhythm        Anesthesia Plan  Type of Anesthesia, risks & benefits discussed:    Anesthesia Type: Gen Natural Airway  Intra-op Monitoring Plan: Standard ASA Monitors  Post Op Pain Control Plan: multimodal analgesia  Induction:  IV  Airway Plan: Direct  Informed Consent: Informed consent signed with the Patient and all parties understand the risks and agree with anesthesia plan.  All questions answered.   ASA Score: 3    Ready For Surgery From Anesthesia Perspective.     .

## 2022-11-29 NOTE — TREATMENT PLAN
Brief GI Treatment Plan    No recurrent melena since admission but Hgb 11.3 > 9.2 overnight. Mildly tachycardic last night, otherwise HDS. BUN stable.    Plan  - EGD today given significant drop in H/H  - Continue IV PPI and ceftriaxone    Tan Braun  Gastroenterology Fellow, PGY-IV

## 2022-11-29 NOTE — SUBJECTIVE & OBJECTIVE
Interval History: No bowel movements overnight. No significant abdominal pain or nausea. Had EGD today, tolerated well. Patient and family updated and bedside, questions answered.     Review of Systems   Constitutional:  Positive for appetite change. Negative for activity change and fever.   Eyes:  Negative for visual disturbance.   Respiratory:  Negative for cough and shortness of breath.    Cardiovascular:  Negative for leg swelling.   Gastrointestinal:  Positive for blood in stool. Negative for abdominal pain, diarrhea, nausea and vomiting.   Neurological:  Negative for weakness.   Psychiatric/Behavioral:  Negative for confusion.    Objective:     Vital Signs (Most Recent):  Temp: 98.1 °F (36.7 °C) (11/29/22 1551)  Pulse: 75 (11/29/22 1551)  Resp: 18 (11/29/22 1551)  BP: 135/77 (11/29/22 1551)  SpO2: 98 % (11/29/22 1551) Vital Signs (24h Range):  Temp:  [98 °F (36.7 °C)-99.9 °F (37.7 °C)] 98.1 °F (36.7 °C)  Pulse:  [] 75  Resp:  [12-20] 18  SpO2:  [96 %-100 %] 98 %  BP: ()/() 135/77     Weight: 90.7 kg (199 lb 15.3 oz)  Body mass index is 27.12 kg/m².    Intake/Output Summary (Last 24 hours) at 11/29/2022 1622  Last data filed at 11/29/2022 1333  Gross per 24 hour   Intake 610 ml   Output --   Net 610 ml      Physical Exam  Vitals and nursing note reviewed.   Constitutional:       General: He is not in acute distress.     Appearance: Normal appearance. He is not ill-appearing.   HENT:      Head: Normocephalic and atraumatic.   Eyes:      Extraocular Movements: Extraocular movements intact.      Conjunctiva/sclera: Conjunctivae normal.   Cardiovascular:      Rate and Rhythm: Normal rate and regular rhythm.      Heart sounds: Normal heart sounds.   Pulmonary:      Effort: Pulmonary effort is normal. No respiratory distress.      Breath sounds: Normal breath sounds.   Abdominal:      General: Bowel sounds are normal.      Palpations: Abdomen is soft.      Tenderness: There is abdominal tenderness  (improved from previous exam). There is no guarding or rebound.   Musculoskeletal:      Cervical back: Normal range of motion and neck supple.   Skin:     General: Skin is warm and dry.      Capillary Refill: Capillary refill takes less than 2 seconds.   Neurological:      General: No focal deficit present.      Mental Status: He is alert.   Psychiatric:         Mood and Affect: Mood normal.         Behavior: Behavior normal.         Thought Content: Thought content normal.         Judgment: Judgment normal.       Significant Labs: All pertinent labs within the past 24 hours have been reviewed.    Significant Imaging: I have reviewed all pertinent imaging results/findings within the past 24 hours.   General Abdominal pain

## 2022-11-29 NOTE — ASSESSMENT & PLAN NOTE
Continue IV PPI  Continue Rocephin ppx  GI consulted, appreciate recs  POAL  EKG 11/29 with portal HTN gastropathy w/o active bleeding, small varices  Repeat CBC in AM

## 2022-11-29 NOTE — PROVATION PATIENT INSTRUCTIONS
Discharge Summary/Instructions after an Endoscopic Procedure  Patient Name: Irvin Macias  Patient MRN: [id number]  Patient YOB: 1975  Tuesday, November 29, 2022  Edouard Maynard MD  Dear patient,  As a result of recent federal legislation (The Federal Cures Act), you may   receive lab or pathology results from your procedure in your [facility]   account before your physician is able to contact you. Your physician or   their representative will relay the results to you with their   recommendations at their soonest availability.  Thank you,  RESTRICTIONS:  During your procedure today, you received medications for sedation.  These   medications may affect your judgment, balance and coordination.  Therefore,   for 24 hours, you have the following restrictions:   - DO NOT drive a car, operate machinery, make legal/financial decisions,   sign important papers or drink alcohol.    ACTIVITY:  Today: no heavy lifting, straining or running due to procedural   sedation/anesthesia.  The following day: return to full activity including work.  DIET:  Eat and drink normally unless instructed otherwise.     TREATMENT FOR COMMON SIDE EFFECTS:  - Mild abdominal pain, nausea, belching, bloating or excessive gas:  rest,   eat lightly and use a heating pad.  - Sore Throat: treat with throat lozenges and/or gargle with warm salt   water.  - Because air was used during the procedure, expelling large amounts of air   from your rectum or belching is normal.  - If a bowel prep was taken, you may not have a bowel movement for 1-3 days.    This is normal.  SYMPTOMS TO WATCH FOR AND REPORT TO YOUR PHYSICIAN:  1. Abdominal pain or bloating, other than gas cramps.  2. Chest pain.  3. Back pain.  4. Signs of infection such as: chills or fever occurring within 24 hours   after the procedure.  5. Rectal bleeding, which would show as bright red, maroon, or black stools.   (A tablespoon of blood from the rectum is not serious, especially  if   hemorrhoids are present.)  6. Vomiting.  7. Weakness or dizziness.  GO DIRECTLY TO THE NEAREST EMERGENCY ROOM IF YOU HAVE ANY OF THE FOLLOWING:      Difficulty breathing              Chills and/or fever over 101 F   Persistent vomiting and/or vomiting blood   Severe abdominal pain   Severe chest pain   Black, tarry stools   Bleeding- more than one tablespoon   Any other symptom or condition that you feel may need urgent attention  Your doctor recommends these additional instructions:  If any biopsies were taken, your doctors clinic will contact you in 1 to 2   weeks with any results.  - Return patient to [facility] for observation.   - Resume previous diet.   - Continue present medications.  For questions, problems or results please call your physician - Edouard Maynard MD at Work:  [phone].  [facility] [place], EMERGENCY ROOM PHONE NUMBER: [phone]  IF A COMPLICATION OR EMERGENCY SITUATION ARISES AND YOU ARE UNABLE TO REACH   YOUR PHYSICIAN - GO DIRECTLY TO THE EMERGENCY ROOM.  Edouard Maynard MD  11/29/2022 1:50:50 PM  This report has been verified and signed electronically.  Dear patient,  As a result of recent federal legislation (The Federal Cures Act), you may   receive lab or pathology results from your procedure in your [facility]   account before your physician is able to contact you. Your physician or   their representative will relay the results to you with their   recommendations at their soonest availability.  Thank you,  PROVATION

## 2022-11-30 VITALS
TEMPERATURE: 98 F | HEART RATE: 70 BPM | DIASTOLIC BLOOD PRESSURE: 73 MMHG | BODY MASS INDEX: 27.08 KG/M2 | SYSTOLIC BLOOD PRESSURE: 128 MMHG | HEIGHT: 72 IN | OXYGEN SATURATION: 98 % | RESPIRATION RATE: 18 BRPM | WEIGHT: 199.94 LBS

## 2022-11-30 DIAGNOSIS — K92.1 MELENA: Primary | ICD-10-CM

## 2022-11-30 LAB
ALBUMIN SERPL BCP-MCNC: 2.5 G/DL (ref 3.5–5.2)
ALP SERPL-CCNC: 134 U/L (ref 55–135)
ALT SERPL W/O P-5'-P-CCNC: 25 U/L (ref 10–44)
ANION GAP SERPL CALC-SCNC: 8 MMOL/L (ref 8–16)
AST SERPL-CCNC: 54 U/L (ref 10–40)
BASOPHILS # BLD AUTO: 0.03 K/UL (ref 0–0.2)
BASOPHILS NFR BLD: 0.6 % (ref 0–1.9)
BILIRUB SERPL-MCNC: 2.6 MG/DL (ref 0.1–1)
BUN SERPL-MCNC: 14 MG/DL (ref 6–20)
CALCIUM SERPL-MCNC: 8.2 MG/DL (ref 8.7–10.5)
CHLORIDE SERPL-SCNC: 105 MMOL/L (ref 95–110)
CO2 SERPL-SCNC: 23 MMOL/L (ref 23–29)
CREAT SERPL-MCNC: 1.9 MG/DL (ref 0.5–1.4)
DIFFERENTIAL METHOD: ABNORMAL
EOSINOPHIL # BLD AUTO: 0.1 K/UL (ref 0–0.5)
EOSINOPHIL NFR BLD: 2.6 % (ref 0–8)
ERYTHROCYTE [DISTWIDTH] IN BLOOD BY AUTOMATED COUNT: 15.6 % (ref 11.5–14.5)
EST. GFR  (NO RACE VARIABLE): 43 ML/MIN/1.73 M^2
GLUCOSE SERPL-MCNC: 100 MG/DL (ref 70–110)
HCT VFR BLD AUTO: 26.9 % (ref 40–54)
HGB BLD-MCNC: 8.7 G/DL (ref 14–18)
IMM GRANULOCYTES # BLD AUTO: 0.01 K/UL (ref 0–0.04)
IMM GRANULOCYTES NFR BLD AUTO: 0.2 % (ref 0–0.5)
INR PPP: 1.5 (ref 0.8–1.2)
LYMPHOCYTES # BLD AUTO: 1.3 K/UL (ref 1–4.8)
LYMPHOCYTES NFR BLD: 23.8 % (ref 18–48)
MAGNESIUM SERPL-MCNC: 2 MG/DL (ref 1.6–2.6)
MCH RBC QN AUTO: 33.3 PG (ref 27–31)
MCHC RBC AUTO-ENTMCNC: 32.3 G/DL (ref 32–36)
MCV RBC AUTO: 103 FL (ref 82–98)
MONOCYTES # BLD AUTO: 0.6 K/UL (ref 0.3–1)
MONOCYTES NFR BLD: 11.8 % (ref 4–15)
NEUTROPHILS # BLD AUTO: 3.3 K/UL (ref 1.8–7.7)
NEUTROPHILS NFR BLD: 61 % (ref 38–73)
NRBC BLD-RTO: 0 /100 WBC
PLATELET # BLD AUTO: 67 K/UL (ref 150–450)
PMV BLD AUTO: 10.7 FL (ref 9.2–12.9)
POTASSIUM SERPL-SCNC: 3.7 MMOL/L (ref 3.5–5.1)
PROT SERPL-MCNC: 7.3 G/DL (ref 6–8.4)
PROTHROMBIN TIME: 15.2 SEC (ref 9–12.5)
RBC # BLD AUTO: 2.61 M/UL (ref 4.6–6.2)
SODIUM SERPL-SCNC: 136 MMOL/L (ref 136–145)
WBC # BLD AUTO: 5.33 K/UL (ref 3.9–12.7)

## 2022-11-30 PROCEDURE — 80053 COMPREHEN METABOLIC PANEL: CPT | Performed by: STUDENT IN AN ORGANIZED HEALTH CARE EDUCATION/TRAINING PROGRAM

## 2022-11-30 PROCEDURE — 96376 TX/PRO/DX INJ SAME DRUG ADON: CPT

## 2022-11-30 PROCEDURE — 36415 COLL VENOUS BLD VENIPUNCTURE: CPT | Performed by: STUDENT IN AN ORGANIZED HEALTH CARE EDUCATION/TRAINING PROGRAM

## 2022-11-30 PROCEDURE — 25000003 PHARM REV CODE 250: Performed by: EMERGENCY MEDICINE

## 2022-11-30 PROCEDURE — 99217 PR OBSERVATION CARE DISCHARGE: ICD-10-PCS | Mod: ,,, | Performed by: STUDENT IN AN ORGANIZED HEALTH CARE EDUCATION/TRAINING PROGRAM

## 2022-11-30 PROCEDURE — 25000003 PHARM REV CODE 250: Performed by: NURSE PRACTITIONER

## 2022-11-30 PROCEDURE — 63600175 PHARM REV CODE 636 W HCPCS: Performed by: STUDENT IN AN ORGANIZED HEALTH CARE EDUCATION/TRAINING PROGRAM

## 2022-11-30 PROCEDURE — 83735 ASSAY OF MAGNESIUM: CPT | Performed by: STUDENT IN AN ORGANIZED HEALTH CARE EDUCATION/TRAINING PROGRAM

## 2022-11-30 PROCEDURE — C9113 INJ PANTOPRAZOLE SODIUM, VIA: HCPCS | Performed by: STUDENT IN AN ORGANIZED HEALTH CARE EDUCATION/TRAINING PROGRAM

## 2022-11-30 PROCEDURE — 85610 PROTHROMBIN TIME: CPT | Performed by: STUDENT IN AN ORGANIZED HEALTH CARE EDUCATION/TRAINING PROGRAM

## 2022-11-30 PROCEDURE — 85025 COMPLETE CBC W/AUTO DIFF WBC: CPT | Performed by: STUDENT IN AN ORGANIZED HEALTH CARE EDUCATION/TRAINING PROGRAM

## 2022-11-30 PROCEDURE — 99217 PR OBSERVATION CARE DISCHARGE: CPT | Mod: ,,, | Performed by: STUDENT IN AN ORGANIZED HEALTH CARE EDUCATION/TRAINING PROGRAM

## 2022-11-30 PROCEDURE — 25000003 PHARM REV CODE 250: Performed by: STUDENT IN AN ORGANIZED HEALTH CARE EDUCATION/TRAINING PROGRAM

## 2022-11-30 PROCEDURE — G0378 HOSPITAL OBSERVATION PER HR: HCPCS

## 2022-11-30 RX ORDER — FUROSEMIDE 20 MG/1
20 TABLET ORAL DAILY
Qty: 30 TABLET | Refills: 11 | Status: SHIPPED | OUTPATIENT
Start: 2022-12-01 | End: 2023-12-01

## 2022-11-30 RX ORDER — SPIRONOLACTONE 50 MG/1
50 TABLET, FILM COATED ORAL DAILY
Qty: 30 TABLET | Refills: 11 | Status: SHIPPED | OUTPATIENT
Start: 2022-12-01 | End: 2023-12-01

## 2022-11-30 RX ADMIN — PANTOPRAZOLE SODIUM 40 MG: 40 INJECTION, POWDER, FOR SOLUTION INTRAVENOUS at 08:11

## 2022-11-30 RX ADMIN — CEFTRIAXONE 2 G: 2 INJECTION, SOLUTION INTRAVENOUS at 11:11

## 2022-11-30 RX ADMIN — RIFAXIMIN 550 MG: 550 TABLET ORAL at 08:11

## 2022-11-30 RX ADMIN — METHOCARBAMOL 750 MG: 750 TABLET ORAL at 12:11

## 2022-11-30 RX ADMIN — FOLIC ACID 1 MG: 1 TABLET ORAL at 08:11

## 2022-11-30 RX ADMIN — CARVEDILOL 12.5 MG: 12.5 TABLET, FILM COATED ORAL at 08:11

## 2022-11-30 RX ADMIN — AMLODIPINE BESYLATE 5 MG: 5 TABLET ORAL at 08:11

## 2022-11-30 RX ADMIN — FUROSEMIDE 20 MG: 20 TABLET ORAL at 08:11

## 2022-11-30 RX ADMIN — OXYCODONE 5 MG: 5 TABLET ORAL at 08:11

## 2022-11-30 RX ADMIN — SPIRONOLACTONE 50 MG: 25 TABLET, FILM COATED ORAL at 08:11

## 2022-11-30 RX ADMIN — LACTULOSE 20 G: 20 SOLUTION ORAL at 08:11

## 2022-11-30 RX ADMIN — ACAMPROSATE CALCIUM 666 MG: 333 TABLET, DELAYED RELEASE ORAL at 08:11

## 2022-11-30 RX ADMIN — Medication 100 MG: at 08:11

## 2022-11-30 RX ADMIN — LIDOCAINE 2 PATCH: 50 PATCH CUTANEOUS at 12:11

## 2022-11-30 NOTE — PLAN OF CARE
[facility] - Emergency Dept  Discharge Final Note    Primary Care Provider: Edouard Gibson MD    Expected Discharge Date: 11/30/22  Future Appointments   Date Time Provider Department Center   12/1/2022  2:00 PM Ambreen Hou MD [facility] [address]   12/6/2022 10:00 AM Ciarra Kulkarni, PhD [facility] [address]   12/13/2022 10:00 AM Ciarra Kulkarni, PhD [facility] [address]   12/20/2022 10:00 AM Ciarra Kulkarni, PhD [facility] PSYCHOL [address]   12/28/2022 10:00 AM LAB, APPOINTMENT [place] [facility]   1/4/2023 10:30 AM Edouard Gibson MD [facility]   1/11/2023 11:15 AM Hossein De La O MD [facility]   1/13/2023  3:30 PM Jacek Arreaga MD [facility] HEPAT [address]   1/31/2023 10:00 AM Dariusz Doyle NP [facility] PSYCH [address]   2/1/2023  9:00 AM Robbin De La Garza,  [facility]     Pt discharged home with no services.  Hossein Gibson RN,BSN        Final Discharge Note (most recent)       Final Note - 11/30/22 1330          Final Note    Assessment Type Final Discharge Note     Anticipated Discharge Disposition Home or Self Care     Hospital Resources/Appts/Education Provided Provided patient/caregiver with written discharge plan information;Appointments scheduled and added to AVS        Post-Acute Status    Discharge Delays None known at this time                     Important Message from Medicare             Contact Info       Edouard Gibson MD   Specialty: Family Medicine, Hospitalist   Relationship: PCP - General    [address] [place] [address]   Phone: [phone]       Next Steps: Schedule an appointment as soon as possible for a visit

## 2022-11-30 NOTE — ANESTHESIA POSTPROCEDURE EVALUATION
Anesthesia Post Evaluation    Patient: Irvin Diaz Jr.    Procedure(s) Performed: Procedure(s) (LRB):  EGD (ESOPHAGOGASTRODUODENOSCOPY) (N/A)    Final Anesthesia Type: general      Patient location during evaluation: PACU  Patient participation: Yes- Able to Participate  Level of consciousness: awake and alert and oriented  Post-procedure vital signs: reviewed and stable  Pain management: adequate  Airway patency: patent    PONV status at discharge: No PONV  Anesthetic complications: no      Cardiovascular status: blood pressure returned to baseline and hemodynamically stable  Respiratory status: unassisted, spontaneous ventilation and room air  Hydration status: euvolemic  Follow-up not needed.          Vitals Value Taken Time   /70 11/29/22 1406   Temp 37.7 °C (99.9 °F) 11/29/22 1343   Pulse 79 11/29/22 1409   Resp 17 11/29/22 1409   SpO2 98 % 11/29/22 1409   Vitals shown include unvalidated device data.      No case tracking events are documented in the log.      Pain/Leno Score: Pain Rating Prior to Med Admin: 8 (11/30/2022  8:39 AM)  Pain Rating Post Med Admin: 6 (11/30/2022  9:39 AM)  Leno Score: 9 (11/29/2022  1:45 PM)

## 2022-11-30 NOTE — TREATMENT PLAN
GI Treatment Plan    Irvin Diaz Jr. is a 47 y.o. male admitted to hospital 11/28/2022 (Hospital Day: 3) due to Melena.     Interval History  Had brown stool last night  Hgb 9.2 > 8.7    Objective  Temp:  [97.5 °F (36.4 °C)-99.9 °F (37.7 °C)] 97.5 °F (36.4 °C) (11/30 0747)  Pulse:  [71-86] 71 (11/30 0747)  BP: ()/(45-80) 114/55 (11/30 0747)  Resp:  [12-20] 16 (11/30 0839)  SpO2:  [96 %-100 %] 98 % (11/30 0747)    General: Alert, Oriented x3, no distress  Abdomen: Non-distended. Normal tympany. Soft. Non-tender. No peritoneal signs.    Laboratory    Recent Labs   Lab 11/28/22  0925 11/29/22  0554 11/30/22  0215   HGB 11.3* 9.2* 8.7*         Assessment and Plan    Irvin Diaz Jr. is a 47 y.o. male with history of decompensated cirrhosis, JAY, HTN here for melena and abdominal pain. Hgb stable, actually improved from baseline and is hemodynamically stable. Low concern for variceal bleed, OK to hold off on octreotide. Does have history of mild PUD, possible he may have developed recurrent ulcer with new abdominal pain. Now s/p EGD showing small varices, portal HTN gastropathy. No ongoing bleeding.      Problem List:  Melena  Decompensated cirrhosis  History of PUD    Recommendations:  - Ok for discharge from GI perspective  - Will arrange for outpatient colonoscopy to evaluate his anastamosis given his history of partial colectomy  - Continue home pantoprazole 40 mg BID at discharge    Thank you for involving us in the care of Irvin Diaz Jr.. We will sign off. Please call with any additional questions, concerns or changes in the patient's clinical status.    Tan Braun MD  Gastroenterology Fellow, PGY IV

## 2022-11-30 NOTE — DISCHARGE INSTRUCTIONS
Please follow up after discharge with GI to have a colonoscopy done. They will call you with this appointment time. You will also need to follow up with hepatology in clinic. Please continue all home medications as you are. Continue taking the Protonix twice daily. Return to the hospital with black or bloody stools, fevers, abdominal pain, severe nausea/vomiting, or you have any other concerns.

## 2022-12-01 ENCOUNTER — TELEPHONE (OUTPATIENT)
Dept: NEPHROLOGY | Facility: CLINIC | Age: 47
End: 2022-12-01
Payer: MEDICARE

## 2022-12-01 NOTE — TELEPHONE ENCOUNTER
----- Message from Katja Ashton sent at 12/1/2022 10:21 AM CST -----  Regarding: Reschedule  Pt is Requesting a Call back regarding Rescheduling appt 12/1/2022  Pt states he was recently discharged from   hospital  yesterday Please call to discuss further .    [phone]

## 2022-12-02 LAB
CLINICAL BIOCHEMIST REVIEW: NORMAL
PLPETH BLD-MCNC: <10 NG/ML
POPETH BLD-MCNC: <10 NG/ML

## 2022-12-09 NOTE — HOSPITAL COURSE
Admitted with melena and abdominal pain. Hgb stable throughout admission, remained hemodynamically stable. GI was consulted, with low concern for variceal bleed. EGD done showing small varices, portal HTN gastropathy. No ongoing bleeding. He was monitored overnight and discharged to continue pantoprazole 40mg BID at discharge. Will have outpatient colonoscopy after DC. GI to arrange.

## 2022-12-09 NOTE — DISCHARGE SUMMARY
[facility] - Observation [address] [place] [address] Medicine  Discharge Summary      Patient Name: Irvin Diaz Jr.  MRN: [id number]  SOLEDAD: [id number]  Patient Class: OP- Observation  Admission Date: 11/28/2022  Hospital Length of Stay: 0 days  Discharge Date and Time: 11/30/2022  1:05 PM  Attending Physician: No att. providers found   Discharging Provider: Keesha Martin MD  Primary Care Provider: Edouard Gibson MD  [facility] Medicine Team: [facility] Keesha Martin MD  Primary Care Team: [facility]    HPI:   Irivn Diaz is a 47YOM with history of alcoholic cirrhosis, HTN, iron-deficiency anemia who presented to the ED with a several day history of abdominal pain and decreased PO intake + a 1d history of melena and bloody stools. He reports that he intermittently has GI bleeding historically with upper (varices) and lower (polyps) bleeding. He has had some non-bloody emesis. Denies missing any doses of medications. Has not had fevers, URI sx. Does report some hallucinations, which he associates with constipation. Improved when he started taking lactulose again. Has not had alcohol in 3.5 months.    In the ED he was afebrile, hypertensive. Given home dose of amlodipine, coreg. Labs with stable Hgb, Cr. Ammonia WNL. MELD 22. CT without obstruction. Rectal exam with dark brown stool. Was given IV PPI, Rocephin, and admitted to the hospital medicine service for further management.       Procedure(s) (LRB):  EGD (ESOPHAGOGASTRODUODENOSCOPY) (N/A)      Hospital Course:   Admitted with melena and abdominal pain. Hgb stable throughout admission, remained hemodynamically stable. GI was consulted, with low concern for variceal bleed. EGD done showing small varices, portal HTN gastropathy. No ongoing bleeding. He was monitored overnight and discharged to continue pantoprazole 40mg BID at discharge. Will have outpatient colonoscopy after DC. GI to arrange.          Goals of Care Treatment Preferences:  Code Status:  Full Code      Consults:   Consults (From admission, onward)        Status Ordering Provider     Inpatient consult to Hepatology  Once        Provider:  (Not yet assigned)    Completed POUNDSTONE, MAY M.     Inpatient consult to Gastroenterology  Once        Provider:  (Not yet assigned)    Completed POUNDSTONE, MAY M.          No new Assessment & Plan notes have been filed under this hospital service since the last note was generated.  Service: Hospital Medicine    Final Active Diagnoses:    Diagnosis Date Noted POA    PRINCIPAL PROBLEM:  Melena [K92.1] 11/28/2022 Unknown    Alcohol use disorder, severe, dependence [F10.20] 07/19/2022 Yes     Chronic    CKD (chronic kidney disease) stage 3, GFR 30-59 ml/min [N18.30] 04/08/2020 Yes    Essential hypertension [I10]  Yes     Chronic    Alcoholic cirrhosis of liver [K70.30]  Yes      Problems Resolved During this Admission:       Discharged Condition: good    Disposition: Home or Self Care    Follow Up:   Follow-up Information     Hepatology Clinic Follow up.    Why: The Clinic Nurse will contact you with a follow up appointment. If you do not hear from them within 48 hours, please call [phone]. Thank You.                     Patient Instructions:      Ambulatory referral/consult to Outpatient Case Management   Referral Priority: Routine Referral Type: Consultation   Referral Reason: Specialty Services Required   Number of Visits Requested: 1     Ambulatory referral/consult to Hepatology   Standing Status: Future   Referral Priority: Routine Referral Type: Consultation   Referral Reason: Specialty Services Required   Requested Specialty: Hepatology   Number of Visits Requested: 1     Diet Cardiac     Notify your health care provider if you experience any of the following:  temperature >100.4     Notify your health care provider if you experience any of the following:  persistent nausea and vomiting or diarrhea     Notify your health care provider if you  experience any of the following:  severe uncontrolled pain     Notify your health care provider if you experience any of the following:  difficulty breathing or increased cough     Notify your health care provider if you experience any of the following:  severe persistent headache     Notify your health care provider if you experience any of the following:  worsening rash     Notify your health care provider if you experience any of the following:  persistent dizziness, light-headedness, or visual disturbances     Notify your health care provider if you experience any of the following:  increased confusion or weakness     Activity as tolerated       Significant Diagnostic Studies: Labs: All labs within the past 24 hours have been reviewed    Pending Diagnostic Studies:     None         Medications:  Reconciled Home Medications:      Medication List      START taking these medications    furosemide 20 MG tablet  Commonly known as: LASIX  Take 1 tablet (20 mg total) by mouth once daily.     spironolactone 50 MG tablet  Commonly known as: ALDACTONE  Take 1 tablet (50 mg total) by mouth once daily.        CHANGE how you take these medications    FERATE 240 (27 FE) MG tablet  Generic drug: ferrous gluconate  Take 2 tablets (480 mg total) by mouth 2 (two) times daily with meals.  What changed: additional instructions     oxyCODONE 5 MG immediate release tablet  Commonly known as: ROXICODONE  Take 1 tablet (5 mg total) by mouth 2 (two) times daily as needed for Pain.  What changed: Another medication with the same name was removed. Continue taking this medication, and follow the directions you see here.        CONTINUE taking these medications    acamprosate 333 mg tablet  Commonly known as: CAMPRAL  Take 2 tablets (666 mg total) by mouth 3 (three) times daily.     amLODIPine 5 MG tablet  Commonly known as: NORVASC  Take 1 tablet (5 mg total) by mouth once daily.     augmented betamethasone dipropionate 0.05 %  cream  Commonly known as: DIPROLENE-AF  Apply topically 2 (two) times daily.     carvediloL 12.5 MG tablet  Commonly known as: COREG  Take 1 tablet (12.5 mg total) by mouth 2 (two) times daily with meals.     clindamycin-benzoyl peroxide gel  Commonly known as: BENZACLIN  Apply topically 2 (two) times daily.     folic acid 1 MG tablet  Commonly known as: FOLVITE  Take 1 tablet (1 mg total) by mouth once daily.     nortriptyline 25 MG capsule  Commonly known as: PAMELOR  Take 1 capsule (25 mg total) by mouth every evening.     pantoprazole 40 MG tablet  Commonly known as: PROTONIX  Take 1 tablet (40 mg total) by mouth 2 (two) times daily.     sildenafiL 100 MG tablet  Commonly known as: VIAGRA  Take 1 tablet (100 mg total) by mouth daily as needed for Erectile Dysfunction.     thiamine 100 MG tablet  Take 1 tablet (100 mg total) by mouth once daily.     XIFAXAN 550 mg Tab  Generic drug: rifAXIMin  Take 1 tablet (550 mg total) by mouth 2 (two) times daily.        STOP taking these medications    famotidine 20 MG tablet  Commonly known as: PEPCID        ASK your doctor about these medications    CONSTULOSE 10 gram/15 mL solution  Generic drug: lactulose  Take 30 mLs (20 g total) by mouth 3 (three) times daily. TITRATE TO 3-4 BOWEL MOVEMENTS DAILY.  Ask about: Should I take this medication?            Indwelling Lines/Drains at time of discharge:   Lines/Drains/Airways     None                 Time spent on the discharge of patient: 25 minutes         May MIGUELITO Martin MD  Department of Hospital Medicine  [facility] - Observation 11H

## 2022-12-14 DIAGNOSIS — M87.052 AVASCULAR NECROSIS OF HIP, LEFT: Primary | ICD-10-CM

## 2022-12-14 DIAGNOSIS — G89.4 CHRONIC PAIN SYNDROME: ICD-10-CM

## 2022-12-14 DIAGNOSIS — R79.1 ELEVATED CLOTTING TIME: ICD-10-CM

## 2022-12-14 DIAGNOSIS — D69.6 THROMBOCYTOPENIA: ICD-10-CM

## 2022-12-14 DIAGNOSIS — M25.552 CHRONIC LEFT HIP PAIN: ICD-10-CM

## 2022-12-14 DIAGNOSIS — G89.29 CHRONIC LEFT HIP PAIN: ICD-10-CM

## 2022-12-14 RX ORDER — FOLIC ACID 1 MG/1
1 TABLET ORAL DAILY
Qty: 30 TABLET | Refills: 5 | Status: SHIPPED | OUTPATIENT
Start: 2022-12-14

## 2022-12-14 RX ORDER — OXYCODONE HYDROCHLORIDE 5 MG/1
5 TABLET ORAL 2 TIMES DAILY PRN
Qty: 60 TABLET | Refills: 0 | Status: SHIPPED | OUTPATIENT
Start: 2022-12-14 | End: 2023-01-18

## 2022-12-14 NOTE — TELEPHONE ENCOUNTER
Patient requesting refill on: Oxycodone 5mg  Last office visit : 10/31/2022   shows last refill on: 11/13/2022  Contract: on file  Patient last UDS:08/02/2022    CODEINE  Not Detected          MORPHINE  Not Detected          6-ACETYLMORPHINE  Not Detected          OXYCODONE  Not Detected       Negative R    NOROYXCODONE  Not Detected          OXYMORPHONE  Not Detected          NOROXYMORPHONE  Not Detected          HYDROCODONE  Not Detected          NORHYDROCODONE  Not Detected          HYDROMORPHONE  Not Detected          BUPRENORPHINE  Not Detected          NORUBPRENORPHINE  Not Detected          FENTANYL  Not Detected          NORFENTANYL  Not Detected          MEPERIDINE METABOLITE  Not Detected          TAPENTADOL  Not Detected          TAPENTADOL-O-SULF  Not Detected          METHADONE  Not Detected          TRAMADOL  Not Detected          AMPHETAMINE  Not Detected          METHAMPHETAMINE  Not Detected          MDMA- ECSTASY  Not Detected       Negative R    MDA  Not Detected          MDEA- Donita  Not Detected          METHYLPHENIDATE  Not Detected          PHENTERMINE  Not Detected          BENZOYLECGONINE  Not Detected          ALPRAZOLAM  Not Detected          ALPHA-OH-ALPRAZOLAM  Not Detected          CLONAZEPAM  Not Detected          7-AMINOCLONAZEPAM  Not Detected          DIAZEPAM  Not Detected          NORDIAZEPAM  Not Detected          OXAZEPAM  Not Detected          TEMAZEPAM  Not Detected          Lorazepam  Not Detected          MIDAZOLAM  Not Detected          ZOLPIDEM  Not Detected          BARBITURATES  Not Detected          Creatinine, Urine 20.0 - 400.0 mg/dL 101.7  293.0 R  112.0 R  112.0 R  59.0 R  175.0 R     ETHYL GLUCURONIDE  Not Detected          MARIJUANA METABOLITE  Present          PCP  Not Detected          CARISOPRODOL  Not Detected          Comment: The carisoprodol immunoassay has cross-reactivity to   carisoprodol and meprobamate.    Naloxone  Not Detected          Gabapentin   Present          Pregabalin  Not Detected          Alpha-OH-Midazolam  Not Detected          Zolpidem Metabolite  Not Detected          PAIN MANAGEMENT DRUG PANEL  See Below

## 2022-12-22 ENCOUNTER — OFFICE VISIT (OUTPATIENT)
Dept: NEPHROLOGY | Facility: CLINIC | Age: 47
End: 2022-12-22
Payer: MEDICARE

## 2022-12-22 VITALS
SYSTOLIC BLOOD PRESSURE: 158 MMHG | HEART RATE: 86 BPM | BODY MASS INDEX: 26.82 KG/M2 | WEIGHT: 198 LBS | OXYGEN SATURATION: 99 % | DIASTOLIC BLOOD PRESSURE: 96 MMHG | HEIGHT: 72 IN

## 2022-12-22 DIAGNOSIS — D63.1 ANEMIA IN CHRONIC KIDNEY DISEASE, UNSPECIFIED CKD STAGE: ICD-10-CM

## 2022-12-22 DIAGNOSIS — N18.30 STAGE 3 CHRONIC KIDNEY DISEASE, UNSPECIFIED WHETHER STAGE 3A OR 3B CKD: Primary | ICD-10-CM

## 2022-12-22 DIAGNOSIS — N18.9 ANEMIA IN CHRONIC KIDNEY DISEASE, UNSPECIFIED CKD STAGE: ICD-10-CM

## 2022-12-22 PROCEDURE — 99999 PR PBB SHADOW E&M-EST. PATIENT-LVL III: ICD-10-PCS | Mod: PBBFAC,GC,, | Performed by: INTERNAL MEDICINE

## 2022-12-22 PROCEDURE — 99213 OFFICE O/P EST LOW 20 MIN: CPT | Mod: PBBFAC | Performed by: INTERNAL MEDICINE

## 2022-12-22 PROCEDURE — 99999 PR PBB SHADOW E&M-EST. PATIENT-LVL III: CPT | Mod: PBBFAC,GC,, | Performed by: INTERNAL MEDICINE

## 2022-12-22 RX ORDER — CALCITRIOL 0.25 UG/1
0.25 CAPSULE ORAL DAILY
Qty: 30 CAPSULE | Refills: 1 | Status: SHIPPED | OUTPATIENT
Start: 2022-12-22

## 2022-12-22 RX ORDER — AMLODIPINE BESYLATE 10 MG/1
10 TABLET ORAL DAILY
Qty: 30 TABLET | Refills: 11 | Status: SHIPPED | OUTPATIENT
Start: 2022-12-22 | End: 2024-03-27

## 2022-12-22 NOTE — PROGRESS NOTES
Nephrology Clinic Note   12/22/2022    CC: here to establish care for CKD3      History of present illness:  Patient is a 47 y.o. male. With PMH of alcoholic liver cirrhosis. Baseline cr is 1.7-2 known to have CKD3a. Patient did have an ENA back in march with cr up to 3 at that time was attributed to ATN. Patient was denied liver transplant because of alcohol. Denies SOB, no chest pain , no urinary symptoms, no NSAID use.  Denies any other symptoms     Patient was discharged from hospital on 11/30/22 after being admitted for melena was seen by GI and had EGD which showed non bleeding varices. His hgb was stable was discharged to follow up with GI to arrange for OPT colonoscopy     No problems updated.  Review of Systems   All other systems reviewed and are negative.    History:  Past Medical History:   Diagnosis Date    Alcoholic cirrhosis of liver     Arthritis     ATN (acute tubular necrosis)     CHF (congestive heart failure)     Diverticulitis 01/2020    Esophageal varices     GERD (gastroesophageal reflux disease)     GI bleed     Hip arthritis     Left    Hypertension     Liver cirrhosis     Macrocytic anemia     Pulmonary embolism 08/2018    Unprovoked DVT.  Stop Coumadin due to GIB    Thrombocytopenia       Past Surgical History:   Procedure Laterality Date    ANKLE SURGERY      BLOCK, NERVE, PERIPHERAL Left 06/24/2022    Procedure: Left Hip femoral-obturator accessory nerve block;  Surgeon: Robbin De La Garza DO;  Location: [facility];  Service: Pain Management;  Laterality: Left;    COLON SURGERY  2007    COLONOSCOPY  09/05/2019    [facility]    COLONOSCOPY N/A 02/17/2021    Procedure: COLONOSCOPY;  Surgeon: Renea Billingsley MD;  Location: [facility];  Service: Endoscopy;  Laterality: N/A;    COLONOSCOPY W/ BIOPSIES  02/17/2021    ESOPHAGOGASTRODUODENOSCOPY N/A 07/26/2019    Procedure: EGD (ESOPHAGOGASTRODUODENOSCOPY);  Surgeon: Brian Trivedi MD;  Location: [facility];  Service: Endoscopy;  Laterality: N/A;     ESOPHAGOGASTRODUODENOSCOPY N/A 04/08/2020    Procedure: EGD (ESOPHAGOGASTRODUODENOSCOPY);  Surgeon: Donn Acuna MD;  Location: [facility];  Service: Endoscopy;  Laterality: N/A;    ESOPHAGOGASTRODUODENOSCOPY N/A 01/03/2021    Procedure: EGD (ESOPHAGOGASTRODUODENOSCOPY)- coffee ground emesis, hx varices;  Surgeon: Steve Chairez MD;  Location: [facility];  Service: Endoscopy;  Laterality: N/A;    ESOPHAGOGASTRODUODENOSCOPY N/A 05/03/2021    Procedure: EGD (ESOPHAGOGASTRODUODENOSCOPY);  Surgeon: Jeanmarie Ngo MD;  Location: [facility];  Service: Endoscopy;  Laterality: N/A;    ESOPHAGOGASTRODUODENOSCOPY N/A 07/19/2021    Procedure: ESOPHAGOGASTRODUODENOSCOPY (EGD);  Surgeon: Claudio Medeiros MD;  Location: [facility];  Service: Endoscopy;  Laterality: N/A;    ESOPHAGOGASTRODUODENOSCOPY  12/20/2021    ESOPHAGOGASTRODUODENOSCOPY N/A 12/20/2021    Procedure: EGD (ESOPHAGOGASTRODUODENOSCOPY);  Surgeon: Ajay Jackson MD;  Location: [facility];  Service: Endoscopy;  Laterality: N/A;    ESOPHAGOGASTRODUODENOSCOPY N/A 05/03/2022    Procedure: EGD (ESOPHAGOGASTRODUODENOSCOPY);  Surgeon: Brian Trivedi MD;  Location: [facility];  Service: Endoscopy;  Laterality: N/A;    ESOPHAGOGASTRODUODENOSCOPY N/A 7/7/2022    Procedure: EGD (ESOPHAGOGASTRODUODENOSCOPY);  Surgeon: Ajay Jackson MD;  Location: [facility];  Service: Endoscopy;  Laterality: N/A;    ESOPHAGOGASTRODUODENOSCOPY N/A 11/29/2022    Procedure: EGD (ESOPHAGOGASTRODUODENOSCOPY);  Surgeon: Edouard Maynard MD;  Location: [facility] ([address] [place] [address]);  Service: Endoscopy;  Laterality: N/A;    HERNIA REPAIR Left     Inguinal    UPPER GASTROINTESTINAL ENDOSCOPY N/A 07/07/2022        Current Outpatient Medications:     acamprosate (CAMPRAL) 333 mg tablet, Take 2 tablets (666 mg total) by mouth 3 (three) times daily., Disp: 180 tablet, Rfl: 11    amLODIPine (NORVASC) 5 MG tablet, Take 1 tablet (5 mg total) by mouth once daily., Disp: 30 tablet, Rfl: 2     augmented betamethasone dipropionate (DIPROLENE-AF) 0.05 % cream, Apply topically 2 (two) times daily., Disp: 50 g, Rfl: 2    carvediloL (COREG) 12.5 MG tablet, Take 1 tablet (12.5 mg total) by mouth 2 (two) times daily with meals., Disp: 60 tablet, Rfl: 11    clindamycin-benzoyl peroxide (BENZACLIN) gel, Apply topically 2 (two) times daily., Disp: 50 g, Rfl: 1    ferrous gluconate (FERGON) 240 (27 FE) MG tablet, Take 2 tablets (480 mg total) by mouth 2 (two) times daily with meals., Disp: 120 tablet, Rfl: 3    folic acid (FOLVITE) 1 MG tablet, Take 1 tablet (1 mg total) by mouth once daily., Disp: 30 tablet, Rfl: 5    furosemide (LASIX) 20 MG tablet, Take 1 tablet (20 mg total) by mouth once daily., Disp: 30 tablet, Rfl: 11    nortriptyline (PAMELOR) 25 MG capsule, Take 1 capsule (25 mg total) by mouth every evening., Disp: 30 capsule, Rfl: 3    oxyCODONE (ROXICODONE) 5 MG immediate release tablet, Take 1 tablet (5 mg total) by mouth 2 (two) times daily as needed for Pain., Disp: 60 tablet, Rfl: 0    pantoprazole (PROTONIX) 40 MG tablet, Take 1 tablet (40 mg total) by mouth 2 (two) times daily., Disp: 60 tablet, Rfl: 11    rifAXIMin (XIFAXAN) 550 mg Tab, Take 1 tablet (550 mg total) by mouth 2 (two) times daily., Disp: 60 tablet, Rfl: 11    sildenafiL (VIAGRA) 100 MG tablet, Take 1 tablet (100 mg total) by mouth daily as needed for Erectile Dysfunction., Disp: 30 tablet, Rfl: 5    spironolactone (ALDACTONE) 50 MG tablet, Take 1 tablet (50 mg total) by mouth once daily., Disp: 30 tablet, Rfl: 11    thiamine 100 MG tablet, Take 1 tablet (100 mg total) by mouth once daily., Disp: 30 tablet, Rfl: 5  Review of patient's allergies indicates:   Allergen Reactions    Ciprofloxacin hcl Hallucinations    Meperidine Hives     Pt medicated w/multiple medications (demerol, protonix, and zofran)  w/i 10 mins time frame prior to developing localized hives near IV site that med was administered. Pt reports he has/takes all other  medications on daily basis.     Morphine Itching    Nsaids (non-steroidal anti-inflammatory drug)      Kidney Disease    Tylenol [acetaminophen]      Hx of liver disease      Social History     Tobacco Use    Smoking status: Former     Types: Cigarettes     Quit date:      Years since quittin.    Smokeless tobacco: Never    Tobacco comments:     quit 20 years ago   Substance Use Topics    Alcohol use: Not Currently     Comment: Quit drinking 21      Family History   Problem Relation Age of Onset    Hypertension Mother     Breast cancer Mother     Hypertension Father     Prostate cancer Father     Bladder Cancer Father         Physical Exam :  There were no vitals filed for this visit.  Physical Exam  Vitals reviewed.   Constitutional:       Appearance: Normal appearance.   HENT:      Head: Normocephalic and atraumatic.      Mouth/Throat:      Mouth: Mucous membranes are moist.   Eyes:      Conjunctiva/sclera: Conjunctivae normal.      Pupils: Pupils are equal, round, and reactive to light.   Cardiovascular:      Rate and Rhythm: Normal rate and regular rhythm.      Pulses: Normal pulses.      Heart sounds: Normal heart sounds.   Pulmonary:      Effort: Pulmonary effort is normal.      Breath sounds: Normal breath sounds.   Abdominal:      General: Bowel sounds are normal.      Palpations: Abdomen is soft.   Musculoskeletal:         General: Normal range of motion.   Skin:     General: Skin is warm.      Capillary Refill: Capillary refill takes less than 2 seconds.   Neurological:      General: No focal deficit present.      Mental Status: He is alert and oriented to person, place, and time.   Psychiatric:         Mood and Affect: Mood normal.       Labs reviewed       Assessment and plan     1) CKD3 secondary to cirrhosis: scr 1.9 at baseline ( baseline 1.7-2)  Patient did have ENA back in 2022 with cr up to 3s and was attributed to ATN and now cr back at baseline  Bicarb 23 no need for sodium  bicarb at this time   Renal US no hydro   UPCR 0.04       2) CKD-MBD:   Check phos last phos was 2.7 in march/2022   Vit D is 14 low   Calcitriol 12 low   Ionized calcium was 0.97 11/1/22  PTH 67 8/18/22   Will prescribe calcitriol     3)Anemia in CKD3:   TSAT 20   Ferritin 179 in august/2022   Will order IV IRON   Had recent admission in November for melena had EGD showed non bleeding varices. Colonoscopy to be arranged by GI as per discharge summary     4) HTN: on coreg 12.5 BID, aldactone 50 mg qday , lasix . Amlodipine 5 mg po qday will increase to 10 mg qday.     5) liver cirrhosis : follow with hepatology. Patient was denied liver transplant       RTC in 4 months

## 2022-12-28 ENCOUNTER — LAB VISIT (OUTPATIENT)
Dept: LAB | Facility: HOSPITAL | Age: 47
End: 2022-12-28
Attending: INTERNAL MEDICINE
Payer: MEDICARE

## 2022-12-28 DIAGNOSIS — L30.9 ECZEMA, UNSPECIFIED TYPE: ICD-10-CM

## 2022-12-28 DIAGNOSIS — K70.30 ALCOHOLIC CIRRHOSIS OF LIVER WITHOUT ASCITES: ICD-10-CM

## 2022-12-28 LAB
ALBUMIN SERPL BCP-MCNC: 3 G/DL (ref 3.5–5.2)
ALP SERPL-CCNC: 166 U/L (ref 55–135)
ALT SERPL W/O P-5'-P-CCNC: 27 U/L (ref 10–44)
ANION GAP SERPL CALC-SCNC: 9 MMOL/L (ref 8–16)
AST SERPL-CCNC: 44 U/L (ref 10–40)
BILIRUB SERPL-MCNC: 4.6 MG/DL (ref 0.1–1)
BUN SERPL-MCNC: 9 MG/DL (ref 6–20)
CALCIUM SERPL-MCNC: 9 MG/DL (ref 8.7–10.5)
CHLORIDE SERPL-SCNC: 104 MMOL/L (ref 95–110)
CO2 SERPL-SCNC: 25 MMOL/L (ref 23–29)
CREAT SERPL-MCNC: 1.6 MG/DL (ref 0.5–1.4)
ERYTHROCYTE [DISTWIDTH] IN BLOOD BY AUTOMATED COUNT: 14.9 % (ref 11.5–14.5)
EST. GFR  (NO RACE VARIABLE): 52.8 ML/MIN/1.73 M^2
GLUCOSE SERPL-MCNC: 96 MG/DL (ref 70–110)
HCT VFR BLD AUTO: 34.8 % (ref 40–54)
HGB BLD-MCNC: 11.4 G/DL (ref 14–18)
INR PPP: 1.5 (ref 0.8–1.2)
MCH RBC QN AUTO: 33.3 PG (ref 27–31)
MCHC RBC AUTO-ENTMCNC: 32.8 G/DL (ref 32–36)
MCV RBC AUTO: 102 FL (ref 82–98)
PLATELET # BLD AUTO: 82 K/UL (ref 150–450)
PMV BLD AUTO: 10.2 FL (ref 9.2–12.9)
POTASSIUM SERPL-SCNC: 3.5 MMOL/L (ref 3.5–5.1)
PROT SERPL-MCNC: 8 G/DL (ref 6–8.4)
PROTHROMBIN TIME: 15.1 SEC (ref 9–12.5)
RBC # BLD AUTO: 3.42 M/UL (ref 4.6–6.2)
SODIUM SERPL-SCNC: 138 MMOL/L (ref 136–145)
WBC # BLD AUTO: 7.25 K/UL (ref 3.9–12.7)

## 2022-12-28 PROCEDURE — 85610 PROTHROMBIN TIME: CPT | Performed by: INTERNAL MEDICINE

## 2022-12-28 PROCEDURE — 85027 COMPLETE CBC AUTOMATED: CPT | Performed by: INTERNAL MEDICINE

## 2022-12-28 PROCEDURE — 80053 COMPREHEN METABOLIC PANEL: CPT | Performed by: INTERNAL MEDICINE

## 2022-12-28 PROCEDURE — 36415 COLL VENOUS BLD VENIPUNCTURE: CPT | Performed by: INTERNAL MEDICINE

## 2022-12-28 RX ORDER — BETAMETHASONE DIPROPIONATE 0.5 MG/G
CREAM TOPICAL 2 TIMES DAILY
Qty: 50 G | Refills: 2 | Status: CANCELLED | OUTPATIENT
Start: 2022-12-28

## 2022-12-28 NOTE — TELEPHONE ENCOUNTER
No new care gaps identified.  [facility] Embedded Care Gaps. Reference number: [id number]. 12/28/2022   1:10:30 PM CST

## 2022-12-28 NOTE — TELEPHONE ENCOUNTER
Refill Routing Note   Medication(s) are not appropriate for processing by [facility] for the following reason(s):      - Outside of protocol    [facility] action(s):  Route          Medication reconciliation completed: No     Appointments  past 12m or future 3m with PCP    Date Provider   Last Visit   Visit date not found Edouard Gibson   Next Visit   Visit date not found Edouard Gibson   ED visits in past 90 days: 1        Note composed:1:45 PM 12/28/2022

## 2023-01-04 ENCOUNTER — TELEPHONE (OUTPATIENT)
Dept: PRIMARY CARE CLINIC | Facility: CLINIC | Age: 48
End: 2023-01-04
Payer: MEDICARE

## 2023-01-04 NOTE — TELEPHONE ENCOUNTER
Returned call to patient in regards to message. I have patient schedule with  on 1/5/2023 at 1045pm

## 2023-01-04 NOTE — TELEPHONE ENCOUNTER
----- Message from Kilo Sevilla sent at 1/4/2023  3:02 PM CST -----  Contact: [phone]  Pt needs a call back about his appt his missed today and would like another appt before 01/13/23. Please call pt back. Next appt is not until March.

## 2023-01-05 ENCOUNTER — OFFICE VISIT (OUTPATIENT)
Dept: PRIMARY CARE CLINIC | Facility: CLINIC | Age: 48
End: 2023-01-05
Payer: MEDICARE

## 2023-01-05 VITALS
HEART RATE: 112 BPM | HEIGHT: 72 IN | WEIGHT: 202.19 LBS | BODY MASS INDEX: 27.39 KG/M2 | SYSTOLIC BLOOD PRESSURE: 142 MMHG | RESPIRATION RATE: 18 BRPM | DIASTOLIC BLOOD PRESSURE: 60 MMHG | OXYGEN SATURATION: 98 % | TEMPERATURE: 99 F

## 2023-01-05 DIAGNOSIS — N18.30 STAGE 3 CHRONIC KIDNEY DISEASE, UNSPECIFIED WHETHER STAGE 3A OR 3B CKD: ICD-10-CM

## 2023-01-05 DIAGNOSIS — I10 ESSENTIAL HYPERTENSION: Primary | ICD-10-CM

## 2023-01-05 DIAGNOSIS — M87.00 AVN (AVASCULAR NECROSIS OF BONE): ICD-10-CM

## 2023-01-05 DIAGNOSIS — D69.6 THROMBOCYTOPENIA: ICD-10-CM

## 2023-01-05 PROCEDURE — 99215 OFFICE O/P EST HI 40 MIN: CPT | Mod: PBBFAC,PN | Performed by: INTERNAL MEDICINE

## 2023-01-05 PROCEDURE — 99999 PR PBB SHADOW E&M-EST. PATIENT-LVL V: CPT | Mod: PBBFAC,,, | Performed by: INTERNAL MEDICINE

## 2023-01-05 PROCEDURE — 99213 OFFICE O/P EST LOW 20 MIN: CPT | Mod: S$PBB,,, | Performed by: INTERNAL MEDICINE

## 2023-01-05 PROCEDURE — 99999 PR PBB SHADOW E&M-EST. PATIENT-LVL V: ICD-10-PCS | Mod: PBBFAC,,, | Performed by: INTERNAL MEDICINE

## 2023-01-05 PROCEDURE — 99213 PR OFFICE/OUTPT VISIT, EST, LEVL III, 20-29 MIN: ICD-10-PCS | Mod: S$PBB,,, | Performed by: INTERNAL MEDICINE

## 2023-01-09 RX ORDER — DIPHENHYDRAMINE HYDROCHLORIDE 50 MG/ML
50 INJECTION INTRAMUSCULAR; INTRAVENOUS ONCE AS NEEDED
Status: CANCELLED | OUTPATIENT
Start: 2023-01-09

## 2023-01-09 RX ORDER — EPINEPHRINE 0.3 MG/.3ML
0.3 INJECTION SUBCUTANEOUS ONCE AS NEEDED
Status: CANCELLED | OUTPATIENT
Start: 2023-01-09

## 2023-01-09 RX ORDER — HEPARIN 100 UNIT/ML
5 SYRINGE INTRAVENOUS
Status: CANCELLED | OUTPATIENT
Start: 2023-01-09

## 2023-01-09 RX ORDER — SODIUM CHLORIDE 0.9 % (FLUSH) 0.9 %
10 SYRINGE (ML) INJECTION
Status: CANCELLED | OUTPATIENT
Start: 2023-01-09

## 2023-01-09 RX ORDER — SODIUM CHLORIDE 9 MG/ML
INJECTION, SOLUTION INTRAVENOUS CONTINUOUS
Status: CANCELLED | OUTPATIENT
Start: 2023-01-09

## 2023-01-09 RX ORDER — METHYLPREDNISOLONE SOD SUCC 125 MG
125 VIAL (EA) INJECTION ONCE AS NEEDED
Status: CANCELLED | OUTPATIENT
Start: 2023-01-09

## 2023-01-10 NOTE — PROGRESS NOTES
Subjective:       Patient ID: Irvin Diaz Jr. is a 47 y.o. male.    Chief Complaint: Cirrhosis (Follow up)    HPI  pt visit today for f/u liver cirrhoses he is being evaluate for liver transplant and ha sbenn quitting etoh he also c/o left hip pain chronic had Xray severe degenerative ds of left hip joint need replacement but need fiux liver problem fisrt he denies sob cp PALAFOX abd pain or distension Review labs higher Tbili but better albumin level no ascites but had esophageal varices  Review of Systems    Objective:      Physical Exam  Vitals and nursing note reviewed.   Constitutional:       General: He is not in acute distress.     Appearance: He is well-developed.   HENT:      Head: Normocephalic and atraumatic.      Right Ear: External ear normal.      Left Ear: External ear normal.      Nose: Nose normal.      Mouth/Throat:      Pharynx: No oropharyngeal exudate.   Eyes:      Extraocular Movements: Extraocular movements intact.      Conjunctiva/sclera: Conjunctivae normal.      Pupils: Pupils are equal, round, and reactive to light.   Neck:      Thyroid: No thyromegaly.   Cardiovascular:      Rate and Rhythm: Normal rate and regular rhythm.      Heart sounds: Normal heart sounds. No murmur heard.    No friction rub. No gallop.   Pulmonary:      Effort: Pulmonary effort is normal. No respiratory distress.      Breath sounds: Normal breath sounds. No wheezing or rales.   Chest:      Chest wall: No tenderness.   Abdominal:      General: Bowel sounds are normal. There is no distension.      Palpations: Abdomen is soft.      Tenderness: There is no abdominal tenderness.   Musculoskeletal:         General: No tenderness or deformity. Normal range of motion.      Cervical back: Normal range of motion and neck supple.   Lymphadenopathy:      Cervical: No cervical adenopathy.   Skin:     General: Skin is warm and dry.      Capillary Refill: Capillary refill takes less than 2 seconds.      Findings: No erythema  or rash.   Neurological:      Mental Status: He is alert and oriented to person, place, and time.   Psychiatric:         Thought Content: Thought content normal.         Judgment: Judgment normal.       Assessment:       1. Essential hypertension    2. Stage 3 chronic kidney disease, unspecified whether stage 3a or 3b CKD    3. Thrombocytopenia    4. AVN (avascular necrosis of bone)        Plan:       Essential hypertension  -     Hypertension Digital Medicine (HDMP) Enrollment Order  -     Hypertension Digital Medicine (HDMP): Assign Onboarding Questionnaires    Stage 3 chronic kidney disease, unspecified whether stage 3a or 3b CKD  Comments:  o change repaet labs    Thrombocytopenia  Comments:  secondary to liver cirrhoses    AVN (avascular necrosis of bone)        Medication List with Changes/Refills   Current Medications    ACAMPROSATE (CAMPRAL) 333 MG TABLET    Take 2 tablets (666 mg total) by mouth 3 (three) times daily.    AMLODIPINE (NORVASC) 10 MG TABLET    Take 1 tablet (10 mg total) by mouth once daily.    AUGMENTED BETAMETHASONE DIPROPIONATE (DIPROLENE-AF) 0.05 % CREAM    Apply topically 2 (two) times daily.    CALCITRIOL (ROCALTROL) 0.25 MCG CAP    Take 1 capsule (0.25 mcg total) by mouth once daily.    CARVEDILOL (COREG) 12.5 MG TABLET    Take 1 tablet (12.5 mg total) by mouth 2 (two) times daily with meals.    CLINDAMYCIN-BENZOYL PEROXIDE (BENZACLIN) GEL    Apply topically 2 (two) times daily.    FERROUS GLUCONATE (FERGON) 240 (27 FE) MG TABLET    Take 2 tablets (480 mg total) by mouth 2 (two) times daily with meals.    FOLIC ACID (FOLVITE) 1 MG TABLET    Take 1 tablet (1 mg total) by mouth once daily.    FUROSEMIDE (LASIX) 20 MG TABLET    Take 1 tablet (20 mg total) by mouth once daily.    NORTRIPTYLINE (PAMELOR) 25 MG CAPSULE    Take 1 capsule (25 mg total) by mouth every evening.    OXYCODONE (ROXICODONE) 5 MG IMMEDIATE RELEASE TABLET    Take 1 tablet (5 mg total) by mouth 2 (two) times daily as  needed for Pain.    PANTOPRAZOLE (PROTONIX) 40 MG TABLET    Take 1 tablet (40 mg total) by mouth 2 (two) times daily.    RIFAXIMIN (XIFAXAN) 550 MG TAB    Take 1 tablet (550 mg total) by mouth 2 (two) times daily.    SILDENAFIL (VIAGRA) 100 MG TABLET    Take 1 tablet (100 mg total) by mouth daily as needed for Erectile Dysfunction.    SPIRONOLACTONE (ALDACTONE) 50 MG TABLET    Take 1 tablet (50 mg total) by mouth once daily.    THIAMINE 100 MG TABLET    Take 1 tablet (100 mg total) by mouth once daily.

## 2023-01-11 ENCOUNTER — HOSPITAL ENCOUNTER (INPATIENT)
Facility: HOSPITAL | Age: 48
LOS: 1 days | Discharge: HOME HEALTH | DRG: 442 | End: 2023-01-12
Attending: EMERGENCY MEDICINE | Admitting: INTERNAL MEDICINE
Payer: MEDICARE

## 2023-01-11 DIAGNOSIS — E72.20 HYPERAMMONEMIA: ICD-10-CM

## 2023-01-11 DIAGNOSIS — K76.82 HEPATIC ENCEPHALOPATHY: Chronic | ICD-10-CM

## 2023-01-11 DIAGNOSIS — R44.3 HALLUCINATIONS: Primary | ICD-10-CM

## 2023-01-11 DIAGNOSIS — K74.60 HEPATIC CIRRHOSIS, UNSPECIFIED HEPATIC CIRRHOSIS TYPE, UNSPECIFIED WHETHER ASCITES PRESENT: ICD-10-CM

## 2023-01-11 DIAGNOSIS — R79.89 ABNORMAL LFTS: ICD-10-CM

## 2023-01-11 LAB
ALBUMIN SERPL BCP-MCNC: 2.9 G/DL (ref 3.5–5.2)
ALP SERPL-CCNC: 175 U/L (ref 55–135)
ALT SERPL W/O P-5'-P-CCNC: 36 U/L (ref 10–44)
AMMONIA PLAS-SCNC: 67 UMOL/L (ref 10–50)
ANION GAP SERPL CALC-SCNC: 8 MMOL/L (ref 8–16)
AST SERPL-CCNC: 63 U/L (ref 10–40)
BASOPHILS # BLD AUTO: 0.01 K/UL (ref 0–0.2)
BASOPHILS NFR BLD: 0.1 % (ref 0–1.9)
BILIRUB SERPL-MCNC: 4.7 MG/DL (ref 0.1–1)
BILIRUB UR QL STRIP: NEGATIVE
BUN SERPL-MCNC: 13 MG/DL (ref 6–20)
CALCIUM SERPL-MCNC: 9 MG/DL (ref 8.7–10.5)
CHLORIDE SERPL-SCNC: 104 MMOL/L (ref 95–110)
CLARITY UR REFRACT.AUTO: CLEAR
CO2 SERPL-SCNC: 22 MMOL/L (ref 23–29)
COLOR UR AUTO: YELLOW
CREAT SERPL-MCNC: 1.8 MG/DL (ref 0.5–1.4)
DIFFERENTIAL METHOD: ABNORMAL
EOSINOPHIL # BLD AUTO: 0.1 K/UL (ref 0–0.5)
EOSINOPHIL NFR BLD: 2.1 % (ref 0–8)
ERYTHROCYTE [DISTWIDTH] IN BLOOD BY AUTOMATED COUNT: 14.9 % (ref 11.5–14.5)
EST. GFR  (NO RACE VARIABLE): 45.9 ML/MIN/1.73 M^2
GLUCOSE SERPL-MCNC: 112 MG/DL (ref 70–110)
GLUCOSE UR QL STRIP: NEGATIVE
HCT VFR BLD AUTO: 34.8 % (ref 40–54)
HGB BLD-MCNC: 11.3 G/DL (ref 14–18)
HGB UR QL STRIP: NEGATIVE
IMM GRANULOCYTES # BLD AUTO: 0.01 K/UL (ref 0–0.04)
IMM GRANULOCYTES NFR BLD AUTO: 0.1 % (ref 0–0.5)
INR PPP: 1.5 (ref 0.8–1.2)
KETONES UR QL STRIP: NEGATIVE
LEUKOCYTE ESTERASE UR QL STRIP: NEGATIVE
LYMPHOCYTES # BLD AUTO: 1.6 K/UL (ref 1–4.8)
LYMPHOCYTES NFR BLD: 23.3 % (ref 18–48)
MCH RBC QN AUTO: 31.9 PG (ref 27–31)
MCHC RBC AUTO-ENTMCNC: 32.5 G/DL (ref 32–36)
MCV RBC AUTO: 98 FL (ref 82–98)
MONOCYTES # BLD AUTO: 0.6 K/UL (ref 0.3–1)
MONOCYTES NFR BLD: 9.1 % (ref 4–15)
NEUTROPHILS # BLD AUTO: 4.4 K/UL (ref 1.8–7.7)
NEUTROPHILS NFR BLD: 65.3 % (ref 38–73)
NITRITE UR QL STRIP: NEGATIVE
NRBC BLD-RTO: 0 /100 WBC
PH UR STRIP: 6 [PH] (ref 5–8)
PLATELET # BLD AUTO: 84 K/UL (ref 150–450)
PMV BLD AUTO: 10.8 FL (ref 9.2–12.9)
POTASSIUM SERPL-SCNC: 3.5 MMOL/L (ref 3.5–5.1)
PROT SERPL-MCNC: 8.3 G/DL (ref 6–8.4)
PROT UR QL STRIP: NEGATIVE
PROTHROMBIN TIME: 15.6 SEC (ref 9–12.5)
RBC # BLD AUTO: 3.54 M/UL (ref 4.6–6.2)
SODIUM SERPL-SCNC: 134 MMOL/L (ref 136–145)
SP GR UR STRIP: 1.01 (ref 1–1.03)
URN SPEC COLLECT METH UR: NORMAL
WBC # BLD AUTO: 6.69 K/UL (ref 3.9–12.7)

## 2023-01-11 PROCEDURE — 85610 PROTHROMBIN TIME: CPT | Performed by: EMERGENCY MEDICINE

## 2023-01-11 PROCEDURE — 25000003 PHARM REV CODE 250: Performed by: INTERNAL MEDICINE

## 2023-01-11 PROCEDURE — 85025 COMPLETE CBC W/AUTO DIFF WBC: CPT | Performed by: EMERGENCY MEDICINE

## 2023-01-11 PROCEDURE — 25000003 PHARM REV CODE 250: Performed by: EMERGENCY MEDICINE

## 2023-01-11 PROCEDURE — 82140 ASSAY OF AMMONIA: CPT | Performed by: EMERGENCY MEDICINE

## 2023-01-11 PROCEDURE — 99285 EMERGENCY DEPT VISIT HI MDM: CPT | Mod: ,,, | Performed by: EMERGENCY MEDICINE

## 2023-01-11 PROCEDURE — 81003 URINALYSIS AUTO W/O SCOPE: CPT | Performed by: EMERGENCY MEDICINE

## 2023-01-11 PROCEDURE — 80053 COMPREHEN METABOLIC PANEL: CPT | Performed by: EMERGENCY MEDICINE

## 2023-01-11 PROCEDURE — 99285 PR EMERGENCY DEPT VISIT,LEVEL V: ICD-10-PCS | Mod: ,,, | Performed by: EMERGENCY MEDICINE

## 2023-01-11 PROCEDURE — 99222 PR INITIAL HOSPITAL CARE,LEVL II: ICD-10-PCS | Mod: AI,,, | Performed by: INTERNAL MEDICINE

## 2023-01-11 PROCEDURE — 99222 1ST HOSP IP/OBS MODERATE 55: CPT | Mod: AI,,, | Performed by: INTERNAL MEDICINE

## 2023-01-11 PROCEDURE — 12000002 HC ACUTE/MED SURGE SEMI-PRIVATE ROOM

## 2023-01-11 PROCEDURE — 99285 EMERGENCY DEPT VISIT HI MDM: CPT | Mod: 25

## 2023-01-11 RX ORDER — THIAMINE HCL 100 MG
100 TABLET ORAL DAILY
Status: DISCONTINUED | OUTPATIENT
Start: 2023-01-12 | End: 2023-01-12 | Stop reason: HOSPADM

## 2023-01-11 RX ORDER — OXYCODONE HYDROCHLORIDE 5 MG/1
5 TABLET ORAL 2 TIMES DAILY PRN
Status: DISCONTINUED | OUTPATIENT
Start: 2023-01-11 | End: 2023-01-12 | Stop reason: HOSPADM

## 2023-01-11 RX ORDER — SODIUM CHLORIDE 0.9 % (FLUSH) 0.9 %
10 SYRINGE (ML) INJECTION
Status: DISCONTINUED | OUTPATIENT
Start: 2023-01-11 | End: 2023-01-12 | Stop reason: HOSPADM

## 2023-01-11 RX ORDER — LACTULOSE 10 G/15ML
20 SOLUTION ORAL ONCE
Status: COMPLETED | OUTPATIENT
Start: 2023-01-11 | End: 2023-01-11

## 2023-01-11 RX ORDER — ACAMPROSATE CALCIUM 333 MG/1
666 TABLET, DELAYED RELEASE ORAL 3 TIMES DAILY
Status: DISCONTINUED | OUTPATIENT
Start: 2023-01-11 | End: 2023-01-12 | Stop reason: HOSPADM

## 2023-01-11 RX ORDER — AMLODIPINE BESYLATE 10 MG/1
10 TABLET ORAL DAILY
Status: DISCONTINUED | OUTPATIENT
Start: 2023-01-12 | End: 2023-01-12 | Stop reason: HOSPADM

## 2023-01-11 RX ORDER — NALOXONE HCL 0.4 MG/ML
0.02 VIAL (ML) INJECTION
Status: DISCONTINUED | OUTPATIENT
Start: 2023-01-11 | End: 2023-01-12 | Stop reason: HOSPADM

## 2023-01-11 RX ORDER — IBUPROFEN 200 MG
16 TABLET ORAL
Status: DISCONTINUED | OUTPATIENT
Start: 2023-01-11 | End: 2023-01-12 | Stop reason: HOSPADM

## 2023-01-11 RX ORDER — FUROSEMIDE 20 MG/1
20 TABLET ORAL DAILY
Status: DISCONTINUED | OUTPATIENT
Start: 2023-01-12 | End: 2023-01-12 | Stop reason: HOSPADM

## 2023-01-11 RX ORDER — FOLIC ACID 1 MG/1
1 TABLET ORAL DAILY
Status: DISCONTINUED | OUTPATIENT
Start: 2023-01-12 | End: 2023-01-12 | Stop reason: HOSPADM

## 2023-01-11 RX ORDER — SPIRONOLACTONE 50 MG/1
50 TABLET, FILM COATED ORAL DAILY
Status: DISCONTINUED | OUTPATIENT
Start: 2023-01-12 | End: 2023-01-12 | Stop reason: HOSPADM

## 2023-01-11 RX ORDER — IBUPROFEN 200 MG
24 TABLET ORAL
Status: DISCONTINUED | OUTPATIENT
Start: 2023-01-11 | End: 2023-01-12 | Stop reason: HOSPADM

## 2023-01-11 RX ORDER — FERROUS GLUCONATE 324(37.5)
324 TABLET ORAL 2 TIMES DAILY WITH MEALS
Status: DISCONTINUED | OUTPATIENT
Start: 2023-01-11 | End: 2023-01-12 | Stop reason: HOSPADM

## 2023-01-11 RX ORDER — SODIUM CHLORIDE 0.9 % (FLUSH) 0.9 %
10 SYRINGE (ML) INJECTION EVERY 12 HOURS PRN
Status: DISCONTINUED | OUTPATIENT
Start: 2023-01-11 | End: 2023-01-12 | Stop reason: HOSPADM

## 2023-01-11 RX ORDER — PANTOPRAZOLE SODIUM 40 MG/1
40 TABLET, DELAYED RELEASE ORAL 2 TIMES DAILY
Status: DISCONTINUED | OUTPATIENT
Start: 2023-01-11 | End: 2023-01-12 | Stop reason: HOSPADM

## 2023-01-11 RX ORDER — LACTULOSE 10 G/15ML
20 SOLUTION ORAL 3 TIMES DAILY
Status: DISCONTINUED | OUTPATIENT
Start: 2023-01-11 | End: 2023-01-12 | Stop reason: HOSPADM

## 2023-01-11 RX ORDER — GLUCAGON 1 MG
1 KIT INJECTION
Status: DISCONTINUED | OUTPATIENT
Start: 2023-01-11 | End: 2023-01-12 | Stop reason: HOSPADM

## 2023-01-11 RX ORDER — CALCITRIOL 0.25 UG/1
0.25 CAPSULE ORAL DAILY
Status: DISCONTINUED | OUTPATIENT
Start: 2023-01-12 | End: 2023-01-12 | Stop reason: HOSPADM

## 2023-01-11 RX ORDER — TALC
6 POWDER (GRAM) TOPICAL NIGHTLY PRN
Status: DISCONTINUED | OUTPATIENT
Start: 2023-01-11 | End: 2023-01-12 | Stop reason: HOSPADM

## 2023-01-11 RX ORDER — CARVEDILOL 12.5 MG/1
12.5 TABLET ORAL 2 TIMES DAILY WITH MEALS
Status: DISCONTINUED | OUTPATIENT
Start: 2023-01-11 | End: 2023-01-12 | Stop reason: HOSPADM

## 2023-01-11 RX ADMIN — CARVEDILOL 12.5 MG: 12.5 TABLET, FILM COATED ORAL at 04:01

## 2023-01-11 RX ADMIN — LACTULOSE 20 G: 20 SOLUTION ORAL at 02:01

## 2023-01-11 RX ADMIN — RIFAXIMIN 550 MG: 550 TABLET ORAL at 08:01

## 2023-01-11 RX ADMIN — PANTOPRAZOLE SODIUM 40 MG: 40 TABLET, DELAYED RELEASE ORAL at 08:01

## 2023-01-11 RX ADMIN — ACAMPROSATE CALCIUM 666 MG: 333 TABLET, DELAYED RELEASE ORAL at 09:01

## 2023-01-11 RX ADMIN — OXYCODONE HYDROCHLORIDE 5 MG: 5 TABLET ORAL at 08:01

## 2023-01-11 RX ADMIN — Medication 324 MG: at 04:01

## 2023-01-11 RX ADMIN — LACTULOSE 20 G: 20 SOLUTION ORAL at 08:01

## 2023-01-11 NOTE — H&P
[facility] - Emergency Dept  Emergency Medicine  History & Physical      Patient Name: Irvin Diaz Jr.  MRN: [id number]  Admission Date: 1/11/2023  Attending Physician: Thaddeus Harmon MD   Primary Care Provider: Edouard Gibson MD         Patient information was obtained from patient and ER records.     Subjective:     Principal Problem:<principal problem not specified>    Chief Complaint:   Chief Complaint   Patient presents with    Hallucinations     Hx of Cirrhosis         HPI:  47-year-old gentleman with a past medical history of alcoholic cirrhosis(sees transplant team as an outpatient), thrombocytopenia, hepatic encephalopathy volume overload, history of GI bleeds, avascular necrosis of left hip, hypertension, was well until today when he found himself hallucinating, specifically he reports see writing on the walls changing, in addition to this he also experience tremors that worsened from his baseline.  Of note, patient is supposed to take lactulose at home, however has not been compliant with medication as he does not like to go to the bathroom very often, I educated patient on the role of lactulose management of his hepatic encephalopathy.  Last bowel movement was 2 days ago with normal consistency. He denies any bleeding per rectum, shortness of breath or worsening abdominal distention.  He has no complaints at this time.    Past Medical History:   Diagnosis Date    Alcoholic cirrhosis of liver     Arthritis     ATN (acute tubular necrosis)     CHF (congestive heart failure)     Diverticulitis 01/2020    Esophageal varices     GERD (gastroesophageal reflux disease)     GI bleed     Hip arthritis     Left    Hypertension     Liver cirrhosis     Macrocytic anemia     Pulmonary embolism 08/2018    Unprovoked DVT.  Stop Coumadin due to GIB    Thrombocytopenia        Past Surgical History:   Procedure Laterality Date    ANKLE SURGERY      BLOCK, NERVE, PERIPHERAL Left 06/24/2022    Procedure: Left Hip  femoral-obturator accessory nerve block;  Surgeon: Robbin De La Garza DO;  Location: [facility] OR;  Service: Pain Management;  Laterality: Left;    COLON SURGERY  2007    COLONOSCOPY  09/05/2019    [facility]    COLONOSCOPY N/A 02/17/2021    Procedure: COLONOSCOPY;  Surgeon: Renea Billingsley MD;  Location: [facility];  Service: Endoscopy;  Laterality: N/A;    COLONOSCOPY W/ BIOPSIES  02/17/2021    ESOPHAGOGASTRODUODENOSCOPY N/A 07/26/2019    Procedure: EGD (ESOPHAGOGASTRODUODENOSCOPY);  Surgeon: Brian Trivedi MD;  Location: [facility];  Service: Endoscopy;  Laterality: N/A;    ESOPHAGOGASTRODUODENOSCOPY N/A 04/08/2020    Procedure: EGD (ESOPHAGOGASTRODUODENOSCOPY);  Surgeon: Donn Acuna MD;  Location: [facility];  Service: Endoscopy;  Laterality: N/A;    ESOPHAGOGASTRODUODENOSCOPY N/A 01/03/2021    Procedure: EGD (ESOPHAGOGASTRODUODENOSCOPY)- coffee ground emesis, hx varices;  Surgeon: Steve Chairez MD;  Location: [facility];  Service: Endoscopy;  Laterality: N/A;    ESOPHAGOGASTRODUODENOSCOPY N/A 05/03/2021    Procedure: EGD (ESOPHAGOGASTRODUODENOSCOPY);  Surgeon: Jeanmarie Ngo MD;  Location: [facility];  Service: Endoscopy;  Laterality: N/A;    ESOPHAGOGASTRODUODENOSCOPY N/A 07/19/2021    Procedure: ESOPHAGOGASTRODUODENOSCOPY (EGD);  Surgeon: Claudio Medeiros MD;  Location: [facility];  Service: Endoscopy;  Laterality: N/A;    ESOPHAGOGASTRODUODENOSCOPY  12/20/2021    ESOPHAGOGASTRODUODENOSCOPY N/A 12/20/2021    Procedure: EGD (ESOPHAGOGASTRODUODENOSCOPY);  Surgeon: Ajay [place] MD;  Location: [facility];  Service: Endoscopy;  Laterality: N/A;    ESOPHAGOGASTRODUODENOSCOPY N/A 05/03/2022    Procedure: EGD (ESOPHAGOGASTRODUODENOSCOPY);  Surgeon: Brian Trivedi MD;  Location: [facility];  Service: Endoscopy;  Laterality: N/A;    ESOPHAGOGASTRODUODENOSCOPY N/A 7/7/2022    Procedure: EGD (ESOPHAGOGASTRODUODENOSCOPY);  Surgeon: Ajay [place] MD;  Location: [facility];  Service: Endoscopy;   Laterality: N/A;    ESOPHAGOGASTRODUODENOSCOPY N/A 11/29/2022    Procedure: EGD (ESOPHAGOGASTRODUODENOSCOPY);  Surgeon: Edouard Maynard MD;  Location: [facility] ([address] [place] [address]);  Service: Endoscopy;  Laterality: N/A;    HERNIA REPAIR Left     Inguinal    UPPER GASTROINTESTINAL ENDOSCOPY N/A 07/07/2022       Review of patient's allergies indicates:   Allergen Reactions    Ciprofloxacin hcl Hallucinations    Meperidine Hives     Pt medicated w/multiple medications (demerol, protonix, and zofran)  w/i 10 mins time frame prior to developing localized hives near IV site that med was administered. Pt reports he has/takes all other medications on daily basis.     Morphine Itching    Nsaids (non-steroidal anti-inflammatory drug)      Kidney Disease    Tylenol [acetaminophen]      Hx of liver disease       No current facility-administered medications on file prior to encounter.     Current Outpatient Medications on File Prior to Encounter   Medication Sig    acamprosate (CAMPRAL) 333 mg tablet Take 2 tablets (666 mg total) by mouth 3 (three) times daily.    amLODIPine (NORVASC) 10 MG tablet Take 1 tablet (10 mg total) by mouth once daily.    augmented betamethasone dipropionate (DIPROLENE-AF) 0.05 % cream Apply topically 2 (two) times daily.    calcitRIOL (ROCALTROL) 0.25 MCG Cap Take 1 capsule (0.25 mcg total) by mouth once daily.    carvediloL (COREG) 12.5 MG tablet Take 1 tablet (12.5 mg total) by mouth 2 (two) times daily with meals.    clindamycin-benzoyl peroxide (BENZACLIN) gel Apply topically 2 (two) times daily.    ferrous gluconate (FERGON) 240 (27 FE) MG tablet Take 2 tablets (480 mg total) by mouth 2 (two) times daily with meals.    folic acid (FOLVITE) 1 MG tablet Take 1 tablet (1 mg total) by mouth once daily.    furosemide (LASIX) 20 MG tablet Take 1 tablet (20 mg total) by mouth once daily.    nortriptyline (PAMELOR) 25 MG capsule Take 1 capsule (25 mg total) by mouth every evening.    oxyCODONE (ROXICODONE) 5 MG  immediate release tablet Take 1 tablet (5 mg total) by mouth 2 (two) times daily as needed for Pain.    pantoprazole (PROTONIX) 40 MG tablet Take 1 tablet (40 mg total) by mouth 2 (two) times daily.    rifAXIMin (XIFAXAN) 550 mg Tab Take 1 tablet (550 mg total) by mouth 2 (two) times daily.    sildenafiL (VIAGRA) 100 MG tablet Take 1 tablet (100 mg total) by mouth daily as needed for Erectile Dysfunction.    spironolactone (ALDACTONE) 50 MG tablet Take 1 tablet (50 mg total) by mouth once daily.    thiamine 100 MG tablet Take 1 tablet (100 mg total) by mouth once daily.     Family History       Problem Relation (Age of Onset)    Bladder Cancer Father    Breast cancer Mother    Hypertension Mother, Father    Prostate cancer Father          Tobacco Use    Smoking status: Former     Types: Cigarettes     Quit date:      Years since quittin.0    Smokeless tobacco: Never    Tobacco comments:     quit 20 years ago   Substance and Sexual Activity    Alcohol use: Not Currently     Comment: Quit drinking 21    Drug use: Not Currently     Types: Marijuana    Sexual activity: Yes     Comment: occ     Review of Systems   All other systems reviewed and are negative.  Objective:     Vital Signs (Most Recent):  Temp: 99.7 °F (37.6 °C) (23)  Pulse: 78 (23)  Resp: 16 (23)  BP: (!) 148/70 (23)  SpO2: 99 % (23)   Vital Signs (24h Range):  Temp:  [98.6 °F (37 °C)-99.7 °F (37.6 °C)] 99.7 °F (37.6 °C)  Pulse:  [78-89] 78  Resp:  [15-16] 16  SpO2:  [98 %-99 %] 99 %  BP: (129-148)/(70-77) 148/70     Weight: 91.6 kg (202 lb)  Body mass index is 27.4 kg/m².    Physical Exam  Constitutional:       General: He is not in acute distress.  HENT:      Head: Normocephalic.      Right Ear: External ear normal.      Left Ear: External ear normal.      Nose: Nose normal.   Eyes:      General: Scleral icterus present.   Cardiovascular:      Rate and Rhythm: Normal rate.      Heart  sounds: Normal heart sounds.   Pulmonary:      Breath sounds: Normal breath sounds.   Abdominal:      Palpations: Abdomen is soft.      Tenderness: There is no abdominal tenderness.   Neurological:      General: No focal deficit present.      Mental Status: He is alert and oriented to person, place, and time.      Comments: Very mild tremors on full extension of both arms   Psychiatric:         Mood and Affect: Mood normal.         Thought Content: Thought content normal.          Significant Labs: All pertinent labs within the past 24 hours have been reviewed.    Significant Imaging: I have reviewed all pertinent imaging results/findings within the past 24 hours.    Assessment/Plan:     Active Diagnoses:    Diagnosis Date Noted POA    Hepatic encephalopathy [K76.82]  Yes    Jaundice [R17] 06/06/2021 Yes    CKD (chronic kidney disease) stage 3, GFR 30-59 ml/min [N18.30] 04/08/2020 Yes    Essential hypertension [I10]  Yes     Chronic    Alcoholic cirrhosis of liver [K70.30]  Yes    Thrombocytopenia [D69.6] 05/11/2019 Yes    AVN (avascular necrosis of bone) [M87.00] 05/11/2019 Yes      Problems Resolved During this Admission:     VTE Risk Mitigation (From admission, onward)           Ordered     Place sequential compression device  Until discontinued         01/11/23 1530     IP VTE LOW RISK PATIENT  Once         01/11/23 1530                MELD-Na score: 21 at 12/28/2022  1:14 PM  MELD score: 21 at 12/28/2022  1:14 PM  Calculated from:  Serum Creatinine: 1.6 mg/dL at 12/28/2022  1:14 PM  Serum Sodium: 138 mmol/L (Using max of 137 mmol/L) at 12/28/2022  1:14 PM  Total Bilirubin: 4.6 mg/dL at 12/28/2022  1:14 PM  INR(ratio): 1.5 at 12/28/2022  1:14 PM  Age: 47 years     Hepatic encephalopathy, lactulose noncompliance.  Will continue patient on lactulose 20 g oral 3 times a day and aim for 3 bowel movements per day.  Continue his rifaximin.  History of alcoholic cirrhosis.  Meld score of 21.  Continues home meds,  outpatient transplant team/hepatology follow-up  History of hypertension.  Continue home meds  CKD.  Stable  Other home meds reviewed and resumed accordingly.  Thrombocytopenia.  Stable      Encourage ambulation with assistance. Fall precaustion    Thaddeus Harmon MD  Department of Hospital Medicine   [facility] - Emergency Dept

## 2023-01-11 NOTE — ED NOTES
Irvin WOOD Joe Gudino, a 47 y.o. male presents to the ED     Triage note:  Chief Complaint   Patient presents with    Hallucinations     Hx of Cirrhosis      Review of patient's allergies indicates:   Allergen Reactions    Ciprofloxacin hcl Hallucinations    Meperidine Hives     Pt medicated w/multiple medications (demerol, protonix, and zofran)  w/i 10 mins time frame prior to developing localized hives near IV site that med was administered. Pt reports he has/takes all other medications on daily basis.     Morphine Itching    Nsaids (non-steroidal anti-inflammatory drug)      Kidney Disease    Tylenol [acetaminophen]      Hx of liver disease     Past Medical History:   Diagnosis Date    Alcoholic cirrhosis of liver     Arthritis     ATN (acute tubular necrosis)     CHF (congestive heart failure)     Diverticulitis 01/2020    Esophageal varices     GERD (gastroesophageal reflux disease)     GI bleed     Hip arthritis     Left    Hypertension     Liver cirrhosis     Macrocytic anemia     Pulmonary embolism 08/2018    Unprovoked DVT.  Stop Coumadin due to GIB    Thrombocytopenia         APPEARANCE: awake and alert in NAD.  SKIN: warm, dry and intact. No breakdown or bruising.  MUSCULOSKELETAL: Patient moving all extremities spontaneously, no obvious swelling or deformities noted. Ambulates independently.  RESPIRATORY: Denies shortness of breath.Respirations unlabored.   CARDIAC: Denies CP, 2+ distal pulses; no peripheral edema  ABDOMEN: S/ND/NT, Denies nausea  : voids spontaneously, denies difficulty  Neurologic: AAO x 4; follows commands equal strength in all extremities; denies numbness/tingling. Denies dizziness. Hallucinations

## 2023-01-11 NOTE — ED PROVIDER NOTES
Encounter Date: 1/11/2023       History     Chief Complaint   Patient presents with    Hallucinations     Hx of Cirrhosis      48 yo male presenting with hallucinations.    PMH: PE, AVN, hepatic encephalopathy, EtOH w/d, CHF, EtOH cirrhosis, esophageal varices, anemia of chronic disease    Context:  The patient states he is experiencing hallucinations.  He describes the hallucinations as seeing writing on the wall that he knows is not there.  Reports this happens when his ammonia is elevated.  He reports he is prescribed lactulose t.i.d., but he typically takes it every other day because he does not like how often it makes him use the bathroom. Patient is under evaluation for liver transplant.   Onset:  Gradual  Duration:  Today  Associated Symptoms:  Denies extremity numbness or weakness       The history is provided by the patient and medical records. No  was used.   Review of patient's allergies indicates:   Allergen Reactions    Ciprofloxacin hcl Hallucinations    Meperidine Hives     Pt medicated w/multiple medications (demerol, protonix, and zofran)  w/i 10 mins time frame prior to developing localized hives near IV site that med was administered. Pt reports he has/takes all other medications on daily basis.     Morphine Itching    Nsaids (non-steroidal anti-inflammatory drug)      Kidney Disease    Tylenol [acetaminophen]      Hx of liver disease     Past Medical History:   Diagnosis Date    Alcoholic cirrhosis of liver     Arthritis     ATN (acute tubular necrosis)     CHF (congestive heart failure)     Diverticulitis 01/2020    Esophageal varices     GERD (gastroesophageal reflux disease)     GI bleed     Hip arthritis     Left    Hypertension     Liver cirrhosis     Macrocytic anemia     Pulmonary embolism 08/2018    Unprovoked DVT.  Stop Coumadin due to GIB    Thrombocytopenia      Past Surgical History:   Procedure Laterality Date    ANKLE SURGERY      BLOCK, NERVE, PERIPHERAL Left  06/24/2022    Procedure: Left Hip femoral-obturator accessory nerve block;  Surgeon: Robbin De La Garza DO;  Location: [facility];  Service: Pain Management;  Laterality: Left;    COLON SURGERY  2007    COLONOSCOPY  09/05/2019    [facility]    COLONOSCOPY N/A 02/17/2021    Procedure: COLONOSCOPY;  Surgeon: Renea Billingsley MD;  Location: [facility];  Service: Endoscopy;  Laterality: N/A;    COLONOSCOPY W/ BIOPSIES  02/17/2021    ESOPHAGOGASTRODUODENOSCOPY N/A 07/26/2019    Procedure: EGD (ESOPHAGOGASTRODUODENOSCOPY);  Surgeon: Brian Trivedi MD;  Location: [facility];  Service: Endoscopy;  Laterality: N/A;    ESOPHAGOGASTRODUODENOSCOPY N/A 04/08/2020    Procedure: EGD (ESOPHAGOGASTRODUODENOSCOPY);  Surgeon: Donn Acuna MD;  Location: [facility];  Service: Endoscopy;  Laterality: N/A;    ESOPHAGOGASTRODUODENOSCOPY N/A 01/03/2021    Procedure: EGD (ESOPHAGOGASTRODUODENOSCOPY)- coffee ground emesis, hx varices;  Surgeon: Steve Chairez MD;  Location: [facility];  Service: Endoscopy;  Laterality: N/A;    ESOPHAGOGASTRODUODENOSCOPY N/A 05/03/2021    Procedure: EGD (ESOPHAGOGASTRODUODENOSCOPY);  Surgeon: Jeanmarie Ngo MD;  Location: [facility];  Service: Endoscopy;  Laterality: N/A;    ESOPHAGOGASTRODUODENOSCOPY N/A 07/19/2021    Procedure: ESOPHAGOGASTRODUODENOSCOPY (EGD);  Surgeon: Claudio Medeiros MD;  Location: [facility];  Service: Endoscopy;  Laterality: N/A;    ESOPHAGOGASTRODUODENOSCOPY  12/20/2021    ESOPHAGOGASTRODUODENOSCOPY N/A 12/20/2021    Procedure: EGD (ESOPHAGOGASTRODUODENOSCOPY);  Surgeon: Ajay [place] MD;  Location: [facility];  Service: Endoscopy;  Laterality: N/A;    ESOPHAGOGASTRODUODENOSCOPY N/A 05/03/2022    Procedure: EGD (ESOPHAGOGASTRODUODENOSCOPY);  Surgeon: Brian Trivedi MD;  Location: [facility];  Service: Endoscopy;  Laterality: N/A;    ESOPHAGOGASTRODUODENOSCOPY N/A 7/7/2022    Procedure: EGD (ESOPHAGOGASTRODUODENOSCOPY);  Surgeon: Ajay [place] MD;  Location: [facility]  ENDO;  Service: Endoscopy;  Laterality: N/A;    ESOPHAGOGASTRODUODENOSCOPY N/A 2022    Procedure: EGD (ESOPHAGOGASTRODUODENOSCOPY);  Surgeon: Edouard Maynard MD;  Location: [facility] ([address] [place] [address]);  Service: Endoscopy;  Laterality: N/A;    HERNIA REPAIR Left     Inguinal    UPPER GASTROINTESTINAL ENDOSCOPY N/A 2022     Family History   Problem Relation Age of Onset    Hypertension Mother     Breast cancer Mother     Hypertension Father     Prostate cancer Father     Bladder Cancer Father      Social History     Tobacco Use    Smoking status: Former     Types: Cigarettes     Quit date:      Years since quittin.0    Smokeless tobacco: Never    Tobacco comments:     quit 20 years ago   Substance Use Topics    Alcohol use: Not Currently     Comment: Quit drinking 21    Drug use: Not Currently     Types: Marijuana     Review of Systems   Constitutional:  Negative for fever.   HENT:  Negative for trouble swallowing.    Eyes:  Negative for visual disturbance.   Respiratory:  Negative for shortness of breath.    Cardiovascular:  Negative for chest pain.   Gastrointestinal:  Negative for vomiting.   Skin:  Negative for rash.   Allergic/Immunologic: Negative for food allergies.   Neurological:  Negative for weakness.   Psychiatric/Behavioral:  Positive for hallucinations. Negative for confusion.      Physical Exam     Initial Vitals [23 0917]   BP Pulse Resp Temp SpO2   129/77 89 15 98.6 °F (37 °C) 98 %      MAP       --         Physical Exam    Nursing note and vitals reviewed.  Constitutional: He is not diaphoretic. No distress.   HENT:   Head: Normocephalic and atraumatic.   Eyes: Right eye exhibits no discharge. Left eye exhibits no discharge.   Neck: Neck supple. No tracheal deviation present.   Cardiovascular:  Normal rate and regular rhythm.           Pulmonary/Chest: Breath sounds normal. No respiratory distress.   Abdominal: Abdomen is soft. There is no abdominal tenderness.    Musculoskeletal:      Cervical back: Neck supple.     Neurological: He is alert and oriented to person, place, and time. He has normal strength. No cranial nerve deficit or sensory deficit.   Negative asterixis   Skin: Skin is warm. No rash noted.   Psychiatric: He has a normal mood and affect. His behavior is normal. He expresses no homicidal and no suicidal ideation.       ED Course   Procedures  Labs Reviewed   CBC W/ AUTO DIFFERENTIAL - Abnormal; Notable for the following components:       Result Value    RBC 3.54 (*)     Hemoglobin 11.3 (*)     Hematocrit 34.8 (*)     MCH 31.9 (*)     RDW 14.9 (*)     Platelets 84 (*)     All other components within normal limits   COMPREHENSIVE METABOLIC PANEL - Abnormal; Notable for the following components:    Sodium 134 (*)     CO2 22 (*)     Glucose 112 (*)     Creatinine 1.8 (*)     Albumin 2.9 (*)     Total Bilirubin 4.7 (*)     Alkaline Phosphatase 175 (*)     AST 63 (*)     eGFR 45.9 (*)     All other components within normal limits   AMMONIA - Abnormal; Notable for the following components:    Ammonia 67 (*)     All other components within normal limits   URINALYSIS, REFLEX TO URINE CULTURE   PROTIME-INR          Imaging Results              CT Head Without Contrast (Final result)  Result time 01/11/23 11:39:44      Final result by Elton Guillory MD (01/11/23 11:39:44)                   Impression:      No acute intracranial process is identified.      Electronically signed by: Elton Guillory  Date:    01/11/2023  Time:    11:39               Narrative:    EXAMINATION:  CT HEAD WITHOUT CONTRAST    CLINICAL HISTORY:  Mental status change, unknown cause;    TECHNIQUE:  Low dose axial images were obtained through the head.  Coronal and sagittal reformations were also performed. Contrast was not administered.    COMPARISON:  07/18/2022    FINDINGS:  No evidence of hydrocephalus, mass effect, intracranial hemorrhage or acute territorial infarct.  The brain parenchyma  maintains normal attenuation.    Few scattered atherosclerotic vascular calcification at the skull base.    The calvarium is intact. The visualized sinuses and mastoid air cells are clear.                                       Medications   sodium chloride 0.9% flush 10 mL (has no administration in time range)   melatonin tablet 6 mg (has no administration in time range)   sodium chloride 0.9% flush 10 mL (has no administration in time range)   naloxone 0.4 mg/mL injection 0.02 mg (has no administration in time range)   glucose chewable tablet 16 g (has no administration in time range)   glucose chewable tablet 24 g (has no administration in time range)   glucagon (human recombinant) injection 1 mg (has no administration in time range)   acamprosate tablet 666 mg (has no administration in time range)   amLODIPine tablet 10 mg (has no administration in time range)   calcitRIOL capsule 0.25 mcg (has no administration in time range)   carvediloL tablet 12.5 mg (has no administration in time range)   ferrous gluconate tablet 324 mg (has no administration in time range)   folic acid tablet 1 mg (has no administration in time range)   furosemide tablet 20 mg (has no administration in time range)   oxyCODONE immediate release tablet 5 mg (has no administration in time range)   pantoprazole EC tablet 40 mg (has no administration in time range)   rifAXIMin tablet 550 mg (has no administration in time range)   spironolactone tablet 50 mg (has no administration in time range)   thiamine tablet 100 mg (has no administration in time range)   lactulose 20 gram/30 mL solution Soln 20 g (has no administration in time range)   lactulose 20 gram/30 mL solution Soln 20 g (20 g Oral Given 1/11/23 1442)     Medical Decision Making:   History:   Old Medical Records: I decided to obtain old medical records.  Old Records Summarized: other records.       <> Summary of Records: 2022 ECHO:  · The left ventricle is mildly enlarged with normal  systolic function.  · The estimated ejection fraction is 63%.  · Indeterminate left ventricular diastolic function.  Most recent 11/2022 discharge summary reviewed, admitted for GIB.  Initial Assessment:   Emergent evaluation of a 48 yo male presenting with hallucinations, concern for elevated ammonia.  On arrival, nonfocal neuro exam, no PEC criteria, no distress.    Differential Diagnosis:   Including, but not limited to:  Hyperammonemia  Decompensated liver failure  No s/sx EtoH WD, no tachycardia, no tremor, no HTN  ICH  Clinical Tests:   Lab Tests: Ordered and Reviewed       <> Summary of Lab: See ED course  UA pending at admission  Radiological Study: Reviewed and Ordered  ED Management:  No acute CTH findings.  Ammonia elevated, likely etiology of patient's presentation.  I discussed disposition options with the patient - offered trial of lactulose and discharge if feeling improved.  We agreed since he is still symptomatic from his elevated ammonia, admission would be safer.  Patient understands and agrees with this plan.  Counseled on importance of compliance with prescribed medications.  I discussed the case with Hospital Medicine for admission, trend sx and ammonia levels, tx of chronic illness with exacerbation.  Other:   I have discussed this case with another health care provider.           ED Course as of 01/11/23 1635   Wed Jan 11, 2023   1224 WBC: 6.69  No leukocytosis  [AB]   1224 Hemoglobin(!): 11.3  Stable  [AB]   1224 Creatinine(!): 1.8  Stable, most recent 1.8 [AB]   1225 AST(!): 63 [AB]   1225 Alkaline Phosphatase(!): 175 [AB]   1225 BILIRUBIN TOTAL(!): 4.7 [AB]   1225 Albumin(!): 2.9 [AB]      ED Course User Index  [AB] Pb See MD                 Clinical Impression:   Final diagnoses:  [E72.20] Hyperammonemia  [R44.3] Hallucinations (Primary)  [K74.60] Hepatic cirrhosis, unspecified hepatic cirrhosis type, unspecified whether ascites present  [R79.89] Abnormal LFTs        ED  Disposition Condition    Admit Stable                Pb See MD  01/11/23 6314

## 2023-01-12 ENCOUNTER — TELEPHONE (OUTPATIENT)
Dept: HEPATOLOGY | Facility: CLINIC | Age: 48
End: 2023-01-12
Payer: MEDICARE

## 2023-01-12 ENCOUNTER — TELEPHONE (OUTPATIENT)
Dept: PRIMARY CARE CLINIC | Facility: CLINIC | Age: 48
End: 2023-01-12
Payer: MEDICARE

## 2023-01-12 VITALS
RESPIRATION RATE: 18 BRPM | OXYGEN SATURATION: 98 % | TEMPERATURE: 99 F | WEIGHT: 202 LBS | HEART RATE: 75 BPM | DIASTOLIC BLOOD PRESSURE: 80 MMHG | SYSTOLIC BLOOD PRESSURE: 153 MMHG | BODY MASS INDEX: 27.36 KG/M2 | HEIGHT: 72 IN

## 2023-01-12 PROBLEM — E87.29 ALCOHOLIC KETOACIDOSIS: Status: RESOLVED | Noted: 2021-06-06 | Resolved: 2023-01-12

## 2023-01-12 PROBLEM — R65.20 SEVERE SEPSIS WITHOUT SEPTIC SHOCK: Status: RESOLVED | Noted: 2021-07-16 | Resolved: 2023-01-12

## 2023-01-12 PROBLEM — M87.00 AVN (AVASCULAR NECROSIS OF BONE): Chronic | Status: ACTIVE | Noted: 2019-05-11

## 2023-01-12 PROBLEM — E87.8 REFEEDING SYNDROME: Status: RESOLVED | Noted: 2021-02-17 | Resolved: 2023-01-12

## 2023-01-12 PROBLEM — R06.02 SHORTNESS OF BREATH: Status: RESOLVED | Noted: 2020-11-19 | Resolved: 2023-01-12

## 2023-01-12 PROBLEM — K92.0 HEMATEMESIS: Status: RESOLVED | Noted: 2021-05-03 | Resolved: 2023-01-12

## 2023-01-12 PROBLEM — R17 JAUNDICE: Chronic | Status: ACTIVE | Noted: 2021-06-06

## 2023-01-12 PROBLEM — I85.10 SECONDARY ESOPHAGEAL VARICES WITHOUT BLEEDING: Chronic | Status: ACTIVE | Noted: 2022-11-29

## 2023-01-12 PROBLEM — N17.9 ACUTE RENAL FAILURE SUPERIMPOSED ON STAGE 3 CHRONIC KIDNEY DISEASE: Status: RESOLVED | Noted: 2019-05-11 | Resolved: 2023-01-12

## 2023-01-12 PROBLEM — M25.552 CHRONIC LEFT HIP PAIN: Chronic | Status: ACTIVE | Noted: 2022-03-24

## 2023-01-12 PROBLEM — G89.29 CHRONIC LEFT HIP PAIN: Chronic | Status: ACTIVE | Noted: 2022-03-24

## 2023-01-12 PROBLEM — A41.9 SEVERE SEPSIS WITHOUT SEPTIC SHOCK: Status: RESOLVED | Noted: 2021-07-16 | Resolved: 2023-01-12

## 2023-01-12 PROBLEM — F10.939 ALCOHOL WITHDRAWAL: Status: RESOLVED | Noted: 2022-05-01 | Resolved: 2023-01-12

## 2023-01-12 PROBLEM — N18.30 ACUTE RENAL FAILURE SUPERIMPOSED ON STAGE 3 CHRONIC KIDNEY DISEASE: Status: RESOLVED | Noted: 2019-05-11 | Resolved: 2023-01-12

## 2023-01-12 PROCEDURE — 25000003 PHARM REV CODE 250: Performed by: INTERNAL MEDICINE

## 2023-01-12 PROCEDURE — 99239 HOSP IP/OBS DSCHRG MGMT >30: CPT | Mod: ,,, | Performed by: HOSPITALIST

## 2023-01-12 PROCEDURE — 99239 PR HOSPITAL DISCHARGE DAY,>30 MIN: ICD-10-PCS | Mod: ,,, | Performed by: HOSPITALIST

## 2023-01-12 RX ORDER — LACTULOSE 10 G/15ML
20 SOLUTION ORAL; RECTAL 3 TIMES DAILY
Qty: 2700 ML | Refills: 11 | Status: SHIPPED | OUTPATIENT
Start: 2023-01-12 | End: 2024-01-12

## 2023-01-12 RX ADMIN — FUROSEMIDE 20 MG: 20 TABLET ORAL at 08:01

## 2023-01-12 RX ADMIN — OXYCODONE HYDROCHLORIDE 5 MG: 5 TABLET ORAL at 03:01

## 2023-01-12 RX ADMIN — PANTOPRAZOLE SODIUM 40 MG: 40 TABLET, DELAYED RELEASE ORAL at 08:01

## 2023-01-12 RX ADMIN — ACAMPROSATE CALCIUM 666 MG: 333 TABLET, DELAYED RELEASE ORAL at 10:01

## 2023-01-12 RX ADMIN — AMLODIPINE BESYLATE 10 MG: 10 TABLET ORAL at 08:01

## 2023-01-12 RX ADMIN — ACAMPROSATE CALCIUM 666 MG: 333 TABLET, DELAYED RELEASE ORAL at 04:01

## 2023-01-12 RX ADMIN — FOLIC ACID 1 MG: 1 TABLET ORAL at 08:01

## 2023-01-12 RX ADMIN — Medication 100 MG: at 08:01

## 2023-01-12 RX ADMIN — LACTULOSE 20 G: 20 SOLUTION ORAL at 03:01

## 2023-01-12 RX ADMIN — OXYCODONE HYDROCHLORIDE 5 MG: 5 TABLET ORAL at 02:01

## 2023-01-12 RX ADMIN — LACTULOSE 20 G: 20 SOLUTION ORAL at 08:01

## 2023-01-12 RX ADMIN — CALCITRIOL CAPSULES 0.25 MCG 0.25 MCG: 0.25 CAPSULE ORAL at 08:01

## 2023-01-12 RX ADMIN — SPIRONOLACTONE 50 MG: 50 TABLET, FILM COATED ORAL at 08:01

## 2023-01-12 RX ADMIN — Medication 324 MG: at 08:01

## 2023-01-12 RX ADMIN — RIFAXIMIN 550 MG: 550 TABLET ORAL at 08:01

## 2023-01-12 RX ADMIN — CARVEDILOL 12.5 MG: 12.5 TABLET, FILM COATED ORAL at 08:01

## 2023-01-12 NOTE — HPI
Irvin Diaz Jr. Is a 47 year old Black man with alcoholic cirrhosis, esophageall varices, hepatic encephalopathy, thrombocytopenia, hypertension, chronic kidney disease stage 3, avascular necrosis of bone, hip arthritis, chronic left hip pain, history of pulmonary embolism in October 2018, history of gastrointestinal bleed. He lives in [facility]. He is legally . His primary care physician is Dr. Edouard Gibson.    He presented to [facility] Emergency Department on 1/11/2023 after he stated hallucinating. He reported seeing writing on the walls that was changing. He also experienced tremors worse than his baseline. He is prescribed lactulose for hepatic encephalopathy but he had not been compliant because he does not like to go to the bathroom often. His last bowel movement was 2 days prior with normal consistency. He also takes rifaximin. Ammonia was 67 umol/L. He was admitted to Hospital Medicine Team B. He was educated on the purpose of lactulose at that time.

## 2023-01-12 NOTE — PLAN OF CARE
[facility] - Emergency Dept      HOME HEALTH ORDERS  FACE TO FACE ENCOUNTER    Patient Name: Irvin Diaz Jr.  YOB: 1975    PCP: Edouard Gibson MD   PCP Address: [address] / [place] [address]  PCP Phone Number: [phone]  PCP Fax: [phone]    Encounter Date: 1/11/23    Admit to Home Health    Diagnoses:  Active Hospital Problems    Diagnosis  POA    *Hepatic encephalopathy [K76.82]  Yes     Chronic    Secondary esophageal varices without bleeding [I85.10]  Yes     Chronic    Jaundice [R17]  Yes     Chronic    CKD (chronic kidney disease) stage 3, GFR 30-59 ml/min [N18.30]  Yes     Chronic    Essential hypertension [I10]  Yes     Chronic    Alcoholic cirrhosis of liver [K70.30]  Yes     Chronic    Thrombocytopenia [D69.6]  Yes     Chronic    AVN (avascular necrosis of bone) [M87.00]  Yes     Chronic      Resolved Hospital Problems   No resolved problems to display.       Follow Up Appointments:  Future Appointments   Date Time Provider Department Center   1/13/2023  3:30 PM Jacek Arreaga MD [facility] HEPAT [facility]   1/19/2023 10:30 AM NURSE, [facility]   2/1/2023  9:00 AM Robbin De La Garza DO [facility]   2/2/2023 11:00 AM PRE-ADMIT, [facility]       Allergies:  Review of patient's allergies indicates:   Allergen Reactions    Ciprofloxacin hcl Hallucinations    Meperidine Hives     Pt medicated w/multiple medications (demerol, protonix, and zofran)  w/i 10 mins time frame prior to developing localized hives near IV site that med was administered. Pt reports he has/takes all other medications on daily basis.     Morphine Itching    Nsaids (non-steroidal anti-inflammatory drug)      Kidney Disease    Tylenol [acetaminophen]      Hx of liver disease       Medications: Review discharge medications with patient and family and provide education.    Current Facility-Administered Medications    Medication Dose Route Frequency Provider Last Rate Last Admin    acamprosate tablet 666 mg  666 mg Oral TID Thaddeus Harmon MD   666 mg at 01/12/23 1635    amLODIPine tablet 10 mg  10 mg Oral Daily Thaddeus Harmon MD   10 mg at 01/12/23 0821    calcitRIOL capsule 0.25 mcg  0.25 mcg Oral Daily Thaddeus Harmon MD   0.25 mcg at 01/12/23 0821    carvediloL tablet 12.5 mg  12.5 mg Oral BID  Thaddeus Harmon MD   12.5 mg at 01/12/23 0822    ferrous gluconate tablet 324 mg  324 mg Oral BID  Thaddeus Harmon MD   324 mg at 01/12/23 0821    folic acid tablet 1 mg  1 mg Oral Daily Thaddeus Harmon MD   1 mg at 01/12/23 0822    furosemide tablet 20 mg  20 mg Oral Daily Thaddeus Harmon MD   20 mg at 01/12/23 0822    glucagon (human recombinant) injection 1 mg  1 mg Intramuscular PRN Thaddeus Harmon MD        glucose chewable tablet 16 g  16 g Oral PRN Thaddeus Harmon MD        glucose chewable tablet 24 g  24 g Oral PRN Thaddeus Harmon MD        lactulose 20 gram/30 mL solution Soln 20 g  20 g Oral TID Thaddeus Harmon MD   20 g at 01/12/23 1535    melatonin tablet 6 mg  6 mg Oral Nightly PRN Pb See MD        naloxone 0.4 mg/mL injection 0.02 mg  0.02 mg Intravenous PRN Thaddeus Harmon MD        oxyCODONE immediate release tablet 5 mg  5 mg Oral BID PRN Thaddeus Harmon MD   5 mg at 01/12/23 1535    pantoprazole EC tablet 40 mg  40 mg Oral BID Thaddeus Harmon MD   40 mg at 01/12/23 0822    rifAXIMin tablet 550 mg  550 mg Oral BID Thaddeus Harmon MD   550 mg at 01/12/23 0822    sodium chloride 0.9% flush 10 mL  10 mL Intravenous PRN Pb See MD        sodium chloride 0.9% flush 10 mL  10 mL Intravenous Q12H PRN Thaddeus Harmon MD        spironolactone tablet 50 mg  50 mg Oral Daily Thaddeus Harmon MD   50 mg at 01/12/23 0822    thiamine tablet 100 mg  100 mg Oral Daily Thaddeus Harmon MD   100 mg at 01/12/23 0822     Current Outpatient Medications   Medication Sig Dispense Refill    acamprosate (CAMPRAL) 333 mg tablet Take 2 tablets (666 mg total) by mouth 3 (three) times daily. 180 tablet 11     amLODIPine (NORVASC) 10 MG tablet Take 1 tablet (10 mg total) by mouth once daily. 30 tablet 11    calcitRIOL (ROCALTROL) 0.25 MCG Cap Take 1 capsule (0.25 mcg total) by mouth once daily. 30 capsule 1    carvediloL (COREG) 12.5 MG tablet Take 1 tablet (12.5 mg total) by mouth 2 (two) times daily with meals. 60 tablet 11    ferrous gluconate (FERGON) 240 (27 FE) MG tablet Take 2 tablets (480 mg total) by mouth 2 (two) times daily with meals. 120 tablet 3    folic acid (FOLVITE) 1 MG tablet Take 1 tablet (1 mg total) by mouth once daily. 30 tablet 5    furosemide (LASIX) 20 MG tablet Take 1 tablet (20 mg total) by mouth once daily. 30 tablet 11    nortriptyline (PAMELOR) 25 MG capsule Take 1 capsule (25 mg total) by mouth every evening. 30 capsule 3    oxyCODONE (ROXICODONE) 5 MG immediate release tablet Take 1 tablet (5 mg total) by mouth 2 (two) times daily as needed for Pain. 60 tablet 0    pantoprazole (PROTONIX) 40 MG tablet Take 1 tablet (40 mg total) by mouth 2 (two) times daily. 60 tablet 11    rifAXIMin (XIFAXAN) 550 mg Tab Take 1 tablet (550 mg total) by mouth 2 (two) times daily. 60 tablet 11    spironolactone (ALDACTONE) 50 MG tablet Take 1 tablet (50 mg total) by mouth once daily. 30 tablet 11    thiamine 100 MG tablet Take 1 tablet (100 mg total) by mouth once daily. 30 tablet 5    augmented betamethasone dipropionate (DIPROLENE-AF) 0.05 % cream Apply topically 2 (two) times daily. 50 g 2    clindamycin-benzoyl peroxide (BENZACLIN) gel Apply topically 2 (two) times daily. 50 g 1    lactulose (CHRONULAC) 10 gram/15 mL solution Take 30 mLs (20 g total) by mouth 3 (three) times daily. 2700 mL 11    sildenafiL (VIAGRA) 100 MG tablet Take 1 tablet (100 mg total) by mouth daily as needed for Erectile Dysfunction. 30 tablet 5     Current Discharge Medication List        START taking these medications    Details   lactulose (CHRONULAC) 10 gram/15 mL solution Take 30 mLs (20 g total) by mouth 3 (three) times  daily.  Qty: 2700 mL, Refills: 11    Associated Diagnoses: Hepatic encephalopathy           CONTINUE these medications which have NOT CHANGED    Details   acamprosate (CAMPRAL) 333 mg tablet Take 2 tablets (666 mg total) by mouth 3 (three) times daily.  Qty: 180 tablet, Refills: 11    Associated Diagnoses: Alcohol use disorder, severe, dependence      amLODIPine (NORVASC) 10 MG tablet Take 1 tablet (10 mg total) by mouth once daily.  Qty: 30 tablet, Refills: 11    Comments: .      calcitRIOL (ROCALTROL) 0.25 MCG Cap Take 1 capsule (0.25 mcg total) by mouth once daily.  Qty: 30 capsule, Refills: 1      carvediloL (COREG) 12.5 MG tablet Take 1 tablet (12.5 mg total) by mouth 2 (two) times daily with meals.  Qty: 60 tablet, Refills: 11    Comments: .      ferrous gluconate (FERGON) 240 (27 FE) MG tablet Take 2 tablets (480 mg total) by mouth 2 (two) times daily with meals.  Qty: 120 tablet, Refills: 3    Associated Diagnoses: Iron deficiency anemia due to chronic blood loss      folic acid (FOLVITE) 1 MG tablet Take 1 tablet (1 mg total) by mouth once daily.  Qty: 30 tablet, Refills: 5    Associated Diagnoses: Thrombocytopenia; Elevated clotting time      furosemide (LASIX) 20 MG tablet Take 1 tablet (20 mg total) by mouth once daily.  Qty: 30 tablet, Refills: 11      nortriptyline (PAMELOR) 25 MG capsule Take 1 capsule (25 mg total) by mouth every evening.  Qty: 30 capsule, Refills: 3    Associated Diagnoses: Avascular necrosis of bone of hip, left; Allodynia      oxyCODONE (ROXICODONE) 5 MG immediate release tablet Take 1 tablet (5 mg total) by mouth 2 (two) times daily as needed for Pain.  Qty: 60 tablet, Refills: 0    Comments: Quantity prescribed more than 7 day supply? Yes, quantity medically necessary. Fill 12/14/22  Associated Diagnoses: Avascular necrosis of hip, left; Chronic left hip pain; Chronic pain syndrome      pantoprazole (PROTONIX) 40 MG tablet Take 1 tablet (40 mg total) by mouth 2 (two) times  daily.  Qty: 60 tablet, Refills: 11    Associated Diagnoses: Secondary esophageal varices without bleeding      rifAXIMin (XIFAXAN) 550 mg Tab Take 1 tablet (550 mg total) by mouth 2 (two) times daily.  Qty: 60 tablet, Refills: 11    Associated Diagnoses: Encephalopathy      spironolactone (ALDACTONE) 50 MG tablet Take 1 tablet (50 mg total) by mouth once daily.  Qty: 30 tablet, Refills: 11    Comments: .      thiamine 100 MG tablet Take 1 tablet (100 mg total) by mouth once daily.  Qty: 30 tablet, Refills: 5    Associated Diagnoses: Alcoholic cirrhosis of liver without ascites      augmented betamethasone dipropionate (DIPROLENE-AF) 0.05 % cream Apply topically 2 (two) times daily.  Qty: 50 g, Refills: 2    Associated Diagnoses: Eczema, unspecified type      clindamycin-benzoyl peroxide (BENZACLIN) gel Apply topically 2 (two) times daily.  Qty: 50 g, Refills: 1    Associated Diagnoses: Acne vulgaris      sildenafiL (VIAGRA) 100 MG tablet Take 1 tablet (100 mg total) by mouth daily as needed for Erectile Dysfunction.  Qty: 30 tablet, Refills: 5               I have seen and examined this patient within the last 30 days. My clinical findings that support the need for the home health skilled services and home bound status are the following:no   Patient with medication mismanagement issues requiring home bound status as evidenced by  Poor understanding of medication regimen/dosage and Poor adherence to medication regimen/dosage.     Diet:   regular diet    Labs:  Ammonia level if confused of hallucinating    Referrals/ Consults  Physical Therapy to evaluate and treat. Evaluate for home safety and equipment needs; Establish/upgrade home exercise program. Perform / instruct on therapeutic exercises, gait training, transfer training, and Range of Motion.  Occupational Therapy to evaluate and treat. Evaluate home environment for safety and equipment needs. Perform/Instruct on transfers, ADL training, ROM, and therapeutic  exercises.    Activities:   activity as tolerated    Nursing:   Agency to admit patient within 24 hours of hospital discharge unless specified on physician order or at patient request    SN to complete comprehensive assessment including routine vital signs. Instruct on disease process and s/s of complications to report to MD. Review/verify medication list sent home with the patient at time of discharge  and instruct patient/caregiver as needed. Frequency may be adjusted depending on start of care date.     Skilled nurse to perform up to 3 visits PRN for symptoms related to diagnosis    Notify MD if SBP > 160 or < 90; DBP > 90 or < 50; HR > 120 or < 50; Temp > 101; O2 < 88%; Other:   confusion    Ok to schedule additional visits based on staff availability and patient request on consecutive days within the home health episode.    When multiple disciplines ordered:    Start of Care occurs on Sunday - Wednesday schedule remaining discipline evaluations as ordered on separate consecutive days following the start of care.    Thursday SOC -schedule subsequent evaluations Friday and Monday the following week.     Friday - Saturday SOC - schedule subsequent discipline evaluations on consecutive days starting Monday of the following week.    For all post-discharge communication and subsequent orders please contact patient's primary care physician. If unable to reach primary care physician or do not receive response within 30 minutes, please contact primary care physician for clinical staff order clarification    Miscellaneous   Increase lactulose dose if confused and constipated    Home Health Aide:  Nursing Three times weekly, Physical Therapy Three times weekly, and Occupational Therapy Three times weekly    Wound Care Orders  no    I certify that this patient is confined to his home and needs intermittent skilled nursing care, physical therapy, and occupational therapy.      Electronically signed  Abiel Owusu  MD [facility] Medicine  01/12/2023

## 2023-01-12 NOTE — PROGRESS NOTES
[facility] - Emergency Dept  [facility] Medicine  Progress Note    Patient Name: Irvin Diaz Jr.  MRN: [id number]  Patient Class: IP- Inpatient   Admission Date: 1/11/2023  Length of Stay: 1 days  Attending Physician: Abiel Owusu MD  Primary Care Provider: Edouard Gibson MD        Subjective:     Principal Problem:Hepatic encephalopathy        HPI:  Irvin Diaz Jr. Is a 47 year old Black man with alcoholic cirrhosis, esophageall varices, hepatic encephalopathy, thrombocytopenia, hypertension, chronic kidney disease stage 3, avascular necrosis of bone, hip arthritis, chronic left hip pain, history of pulmonary embolism in October 2018, history of gastrointestinal bleed. He lives in [facility]. He is legally . His primary care physician is Dr. Edouard Gibson.    He presented to [facility] Emergency Department on 1/11/2023 after he stated hallucinating. He reported seeing writing on the walls that was changing. He also experienced tremors worse than his baseline. He is prescribed lactulose for hepatic encephalopathy but he had not been compliant because he does not like to go to the bathroom often. His last bowel movement was 2 days prior with normal consistency. He also takes rifaximin. Ammonia was 67 umol/L. He was admitted to Hospital Medicine Team B. He was educated on the purpose of lactulose at that time.       Overview/Hospital Course:  Lactulose was resumed. He had bowel movements. Symptoms resolved. He felt well enough to go home the next day. Lactulose was refilled.       Interval History: Better    Review of Systems   Constitutional:  Negative for chills and fever.   Respiratory:  Negative for cough and shortness of breath.    Psychiatric/Behavioral:  Negative for confusion and hallucinations.    Objective:     Vital Signs (Most Recent):  Temp: 98.7 °F (37.1 °C) (01/12/23 1054)  Pulse: 75 (01/12/23 1054)  Resp: 20 (01/12/23 1054)  BP: (!) 153/80  (01/12/23 1054)  SpO2: 98 % (01/12/23 1054)   Vital Signs (24h Range):  Temp:  [98.5 °F (36.9 °C)-99.7 °F (37.6 °C)] 98.7 °F (37.1 °C)  Pulse:  [73-80] 75  Resp:  [16-20] 20  SpO2:  [98 %-99 %] 98 %  BP: (147-175)/(70-92) 153/80     Weight: 91.6 kg (202 lb)  Body mass index is 27.4 kg/m².  No intake or output data in the 24 hours ending 01/12/23 1203   Physical Exam  Vitals and nursing note reviewed.   Constitutional:       General: He is not in acute distress.     Appearance: He is well-developed. He is not toxic-appearing or diaphoretic.   Pulmonary:      Effort: Pulmonary effort is normal. No respiratory distress.   Neurological:      Mental Status: He is alert and oriented to person, place, and time. Mental status is at baseline.      Motor: No seizure activity.   Psychiatric:         Attention and Perception: Attention normal.         Mood and Affect: Mood and affect normal.         Behavior: Behavior is cooperative.       Significant Labs: All pertinent labs within the past 24 hours have been reviewed.    Significant Imaging: I have reviewed all pertinent imaging results/findings within the past 24 hours.  CT Head Without Contrast 1/11/23: FINDINGS:   No evidence of hydrocephalus, mass effect, intracranial hemorrhage or acute territorial infarct.  The brain parenchyma maintains normal attenuation.   Few scattered atherosclerotic vascular calcification at the skull base.   The calvarium is intact. The visualized sinuses and mastoid air cells are clear.   Impression:  No acute intracranial process is identified.       Assessment/Plan:      * Hepatic encephalopathy  Resumed home lactulose. Continue home rifaximin.    Secondary esophageal varices without bleeding  Continue home carvedilol.    CKD (chronic kidney disease) stage 3, GFR 30-59 ml/min  Stable.     Alcoholic cirrhosis of liver  Followed outpatient. Stable.    Essential hypertension  Continue home amlodipine, carvedilol.    Thrombocytopenia  Stable.      VTE Risk Mitigation (From admission, onward)         Ordered     Place sequential compression device  Until discontinued         01/11/23 1530     IP VTE LOW RISK PATIENT  Once         01/11/23 1530                Discharge Planning   RADHA: 1/12/2023     Code Status: Full Code   Is the patient medically ready for discharge?: Yes                         Abiel Owusu MD  Department of Hospital Medicine   [facility] - Emergency Dept

## 2023-01-12 NOTE — TELEPHONE ENCOUNTER
----- Message from Yenifer Ordonez MA sent at 1/11/2023  4:40 PM CST -----    ----- Message -----  From: Edouard Gibson MD  Sent: 1/11/2023   4:26 PM CST  To: [facility] Staff    Please schedule [facility] hospital follow-up visit

## 2023-01-12 NOTE — SUBJECTIVE & OBJECTIVE
Interval History: Better    Review of Systems   Constitutional:  Negative for chills and fever.   Respiratory:  Negative for cough and shortness of breath.    Psychiatric/Behavioral:  Negative for confusion and hallucinations.    Objective:     Vital Signs (Most Recent):  Temp: 98.7 °F (37.1 °C) (01/12/23 1054)  Pulse: 75 (01/12/23 1054)  Resp: 20 (01/12/23 1054)  BP: (!) 153/80 (01/12/23 1054)  SpO2: 98 % (01/12/23 1054)   Vital Signs (24h Range):  Temp:  [98.5 °F (36.9 °C)-99.7 °F (37.6 °C)] 98.7 °F (37.1 °C)  Pulse:  [73-80] 75  Resp:  [16-20] 20  SpO2:  [98 %-99 %] 98 %  BP: (147-175)/(70-92) 153/80     Weight: 91.6 kg (202 lb)  Body mass index is 27.4 kg/m².  No intake or output data in the 24 hours ending 01/12/23 1203   Physical Exam  Vitals and nursing note reviewed.   Constitutional:       General: He is not in acute distress.     Appearance: He is well-developed. He is not toxic-appearing or diaphoretic.   Pulmonary:      Effort: Pulmonary effort is normal. No respiratory distress.   Neurological:      Mental Status: He is alert and oriented to person, place, and time. Mental status is at baseline.      Motor: No seizure activity.   Psychiatric:         Attention and Perception: Attention normal.         Mood and Affect: Mood and affect normal.         Behavior: Behavior is cooperative.       Significant Labs: All pertinent labs within the past 24 hours have been reviewed.    Significant Imaging: I have reviewed all pertinent imaging results/findings within the past 24 hours.  CT Head Without Contrast 1/11/23: FINDINGS:   No evidence of hydrocephalus, mass effect, intracranial hemorrhage or acute territorial infarct.  The brain parenchyma maintains normal attenuation.   Few scattered atherosclerotic vascular calcification at the skull base.   The calvarium is intact. The visualized sinuses and mastoid air cells are clear.   Impression:  No acute intracranial process is identified.

## 2023-01-12 NOTE — DISCHARGE SUMMARY
[facility] - Emergency Dept  [facility] Medicine  Discharge Summary      Patient Name: Irvin Diaz Jr.  MRN: [id number]  SOLEDAD: [id number]  Patient Class: IP- Inpatient  Admission Date: 1/11/2023  Hospital Length of Stay: 1 days  Discharge Date and Time: 1/12/2023  5:51 PM  Attending Physician: Abiel Owusu MD   Discharging Provider: Abiel Owusu MD  Primary Care Provider: Edouard Gibson MD  [facility] Medicine Team: [facility] MED B Abiel Owusu MD  Primary Care Team: [facility] MED B    HPI:   Irvin Diaz Jr. Is a 47 year old Black man with alcoholic cirrhosis, esophageall varices, hepatic encephalopathy, thrombocytopenia, hypertension, chronic kidney disease stage 3, avascular necrosis of bone, hip arthritis, chronic left hip pain, history of pulmonary embolism in October 2018, history of gastrointestinal bleed. He lives in [facility]. He is legally . His primary care physician is Dr. Edouard Gibson.    He presented to [facility] Emergency Department on 1/11/2023 after he stated hallucinating. He reported seeing writing on the walls that was changing. He also experienced tremors worse than his baseline. He is prescribed lactulose for hepatic encephalopathy but he had not been compliant because he does not like to go to the bathroom often. His last bowel movement was 2 days prior with normal consistency. He also takes rifaximin. Ammonia was 67 umol/L. He was admitted to Hospital Medicine Team B. He was educated on the purpose of lactulose at that time.         Hospital Course:   Lactulose was resumed. He had bowel movements. Symptoms resolved. He felt well enough to go home the next day. Lactulose was refilled. Home health was ordered.       Goals of Care Treatment Preferences:  Code Status: Full Code      Consults: none    Final Active Diagnoses:    Diagnosis Date Noted POA    PRINCIPAL PROBLEM:  Hepatic encephalopathy [K76.82]  Yes     Chronic     Secondary esophageal varices without bleeding [I85.10] 11/29/2022 Yes     Chronic    Jaundice [R17] 06/06/2021 Yes     Chronic    CKD (chronic kidney disease) stage 3, GFR 30-59 ml/min [N18.30] 04/08/2020 Yes     Chronic    Essential hypertension [I10]  Yes     Chronic    Alcoholic cirrhosis of liver [K70.30]  Yes     Chronic    Thrombocytopenia [D69.6] 05/11/2019 Yes     Chronic    AVN (avascular necrosis of bone) [M87.00] 05/11/2019 Yes     Chronic      Problems Resolved During this Admission:       Discharged Condition: good    Disposition: Home or Self Care    Follow Up:   Follow-up Information       Edouard Gibson MD Follow up.    Specialties: Family Medicine, Hospitalist  Contact information:  [address] [place] [address] [phone]                           Patient Instructions:      Diet Adult Regular     Notify your health care provider if you experience any of the following:  increased confusion or weakness     Activity as tolerated       Significant Diagnostic Studies:  CT Head Without Contrast 1/11/23: FINDINGS:   No evidence of hydrocephalus, mass effect, intracranial hemorrhage or acute territorial infarct.  The brain parenchyma maintains normal attenuation.   Few scattered atherosclerotic vascular calcification at the skull base.   The calvarium is intact. The visualized sinuses and mastoid air cells are clear.   Impression:  No acute intracranial process is identified.      Medications:  Reconciled Home Medications:      Medication List        START taking these medications      lactulose 10 gram/15 mL solution  Commonly known as: CHRONULAC  Take 30 mLs (20 g total) by mouth 3 (three) times daily.            CONTINUE taking these medications      acamprosate 333 mg tablet  Commonly known as: CAMPRAL  Take 2 tablets (666 mg total) by mouth 3 (three) times daily.     amLODIPine 10 MG tablet  Commonly known as: NORVASC  Take 1 tablet (10 mg total) by mouth once daily.     augmented  betamethasone dipropionate 0.05 % cream  Commonly known as: DIPROLENE-AF  Apply topically 2 (two) times daily.     calcitRIOL 0.25 MCG Cap  Commonly known as: ROCALTROL  Take 1 capsule (0.25 mcg total) by mouth once daily.     carvediloL 12.5 MG tablet  Commonly known as: COREG  Take 1 tablet (12.5 mg total) by mouth 2 (two) times daily with meals.     clindamycin-benzoyl peroxide gel  Commonly known as: BENZACLIN  Apply topically 2 (two) times daily.     FERATE 240 (27 FE) MG tablet  Generic drug: ferrous gluconate  Take 2 tablets (480 mg total) by mouth 2 (two) times daily with meals.     folic acid 1 MG tablet  Commonly known as: FOLVITE  Take 1 tablet (1 mg total) by mouth once daily.     furosemide 20 MG tablet  Commonly known as: LASIX  Take 1 tablet (20 mg total) by mouth once daily.     nortriptyline 25 MG capsule  Commonly known as: PAMELOR  Take 1 capsule (25 mg total) by mouth every evening.     oxyCODONE 5 MG immediate release tablet  Commonly known as: ROXICODONE  Take 1 tablet (5 mg total) by mouth 2 (two) times daily as needed for Pain.     pantoprazole 40 MG tablet  Commonly known as: PROTONIX  Take 1 tablet (40 mg total) by mouth 2 (two) times daily.     sildenafiL 100 MG tablet  Commonly known as: VIAGRA  Take 1 tablet (100 mg total) by mouth daily as needed for Erectile Dysfunction.     spironolactone 50 MG tablet  Commonly known as: ALDACTONE  Take 1 tablet (50 mg total) by mouth once daily.     thiamine 100 MG tablet  Take 1 tablet (100 mg total) by mouth once daily.     XIFAXAN 550 mg Tab  Generic drug: rifAXIMin  Take 1 tablet (550 mg total) by mouth 2 (two) times daily.              Indwelling Lines/Drains at time of discharge: none         Time spent on the discharge of patient: 35 minutes         Abiel Owusu MD  Department of Hospital Medicine  [facility] - Emergency Dept

## 2023-01-12 NOTE — HOSPITAL COURSE
Lactulose was resumed. He had bowel movements. Symptoms resolved. He felt well enough to go home the next day. Lactulose was refilled. Home health was ordered.

## 2023-01-13 ENCOUNTER — OFFICE VISIT (OUTPATIENT)
Dept: HEPATOLOGY | Facility: CLINIC | Age: 48
End: 2023-01-13
Payer: MEDICARE

## 2023-01-13 ENCOUNTER — PATIENT OUTREACH (OUTPATIENT)
Dept: ADMINISTRATIVE | Facility: CLINIC | Age: 48
End: 2023-01-13
Payer: MEDICARE

## 2023-01-13 ENCOUNTER — LAB VISIT (OUTPATIENT)
Dept: LAB | Facility: HOSPITAL | Age: 48
End: 2023-01-13
Payer: MEDICARE

## 2023-01-13 VITALS
SYSTOLIC BLOOD PRESSURE: 166 MMHG | DIASTOLIC BLOOD PRESSURE: 80 MMHG | WEIGHT: 198.19 LBS | OXYGEN SATURATION: 99 % | HEART RATE: 88 BPM | HEIGHT: 72 IN | TEMPERATURE: 98 F | BODY MASS INDEX: 26.84 KG/M2

## 2023-01-13 DIAGNOSIS — K70.30 ALCOHOLIC CIRRHOSIS OF LIVER WITHOUT ASCITES: Chronic | ICD-10-CM

## 2023-01-13 DIAGNOSIS — K74.60 CIRRHOSIS OF LIVER WITH ASCITES, UNSPECIFIED HEPATIC CIRRHOSIS TYPE: Primary | ICD-10-CM

## 2023-01-13 DIAGNOSIS — R18.8 CIRRHOSIS OF LIVER WITH ASCITES, UNSPECIFIED HEPATIC CIRRHOSIS TYPE: Primary | ICD-10-CM

## 2023-01-13 DIAGNOSIS — R18.8 CIRRHOSIS OF LIVER WITH ASCITES, UNSPECIFIED HEPATIC CIRRHOSIS TYPE: ICD-10-CM

## 2023-01-13 DIAGNOSIS — K74.60 CIRRHOSIS OF LIVER WITH ASCITES, UNSPECIFIED HEPATIC CIRRHOSIS TYPE: ICD-10-CM

## 2023-01-13 PROCEDURE — 99999 PR PBB SHADOW E&M-EST. PATIENT-LVL IV: ICD-10-PCS | Mod: PBBFAC,,, | Performed by: INTERNAL MEDICINE

## 2023-01-13 PROCEDURE — 99214 OFFICE O/P EST MOD 30 MIN: CPT | Mod: PBBFAC | Performed by: INTERNAL MEDICINE

## 2023-01-13 PROCEDURE — 99999 PR PBB SHADOW E&M-EST. PATIENT-LVL IV: CPT | Mod: PBBFAC,,, | Performed by: INTERNAL MEDICINE

## 2023-01-13 PROCEDURE — 99214 PR OFFICE/OUTPT VISIT, EST, LEVL IV, 30-39 MIN: ICD-10-PCS | Mod: S$PBB,,, | Performed by: INTERNAL MEDICINE

## 2023-01-13 PROCEDURE — 80321 ALCOHOLS BIOMARKERS 1OR 2: CPT | Performed by: INTERNAL MEDICINE

## 2023-01-13 PROCEDURE — 99214 OFFICE O/P EST MOD 30 MIN: CPT | Mod: S$PBB,,, | Performed by: INTERNAL MEDICINE

## 2023-01-13 PROCEDURE — 36415 COLL VENOUS BLD VENIPUNCTURE: CPT | Performed by: INTERNAL MEDICINE

## 2023-01-13 NOTE — PLAN OF CARE
[facility] - Emergency Dept  Discharge Final Note    Primary Care Provider: Edouard Gibson MD    Expected Discharge Date: 1/12/2023    Final Discharge Note (most recent)       Final Note - 01/12/23 1847          Final Note    Assessment Type Final Discharge Note (P)      Anticipated Discharge Disposition Home-Health Care c (P)      Hospital Resources/Appts/Education Provided Provided patient/caregiver with written discharge plan information (P)         Post-Acute Status    Post-Acute Authorization Home Health (P)      Home Health Status Pending Clinical Review (P)      Discharge Delays None known at this time (P)                      Important Message from Medicare             Contact Info       Edouard Gibson MD   Specialty: Family Medicine, Hospitalist   Relationship: PCP - General    [address] [place] [address]   Phone: [phone]       Next Steps: Follow up

## 2023-01-13 NOTE — PROGRESS NOTES
C3 nurse spoke with Irvin Diaz Jr.  for a TCC post hospital discharge follow up call. The patient does not have a scheduled HOSFU appointment with Edouard Gibson MD  within 5-7 days post hospital discharge date 1/12/23. C3 nurse was unable to schedule HOSFU appointment in [facility].    Message sent to PCP staff requesting they contact patient and schedule follow up appointment.

## 2023-01-13 NOTE — PLAN OF CARE
D/C today  HH ordered  HH orders shared via [facility] to follow     [facility] - Emergency Dept  Initial Discharge Assessment       Primary Care Provider: Edouard Gibson MD    Admission Diagnosis: Hyperammonemia [E72.20]    Admission Date: 1/11/2023  Expected Discharge Date: 1/12/2023         Payor: MEDICARE / Plan: MEDICARE PART A & B / Product Type: Government /     Extended Emergency Contact Information  Primary Emergency Contact: Irvin Macias   [facility]  Home Phone: [phone]  Work Phone: [phone]  Mobile Phone: [phone]  Relation: Father  Preferred language: English   needed? No  Secondary Emergency Contact: Sharon Macias  Address: [address] [place] [address] [id number] [place] [address]  Home Phone: [phone]  Mobile Phone: [phone]  Relation: Mother  Preferred language: English   needed? No    Discharge Plan A: (P) Home Health         [facility]  [address] [facility] [address]  Phone: [phone] Fax: [phone]      Initial Assessment (most recent)       Adult Discharge Assessment - 01/12/23 1844          Discharge Assessment    Assessment Type Discharge Planning Assessment (P)      Confirmed/corrected address, phone number and insurance Yes (P)      Confirmed Demographics Correct on Facesheet (P)      Source of Information patient;family;health record (P)      People in Home parent(s) (P)      Prior to hospitilization cognitive status: Alert/Oriented (P)      Current cognitive status: Alert/Oriented (P)      Equipment Currently Used at Home cane, straight (P)      Discharge Plan A Home Health (P)      DME Needed Upon Discharge  none (P)      Discharge Plan discussed with: Patient (P)

## 2023-01-13 NOTE — PROGRESS NOTES
Subjective:       Patient ID: Irvin Diaz Jr. is a 47 y.o. male.    Chief Complaint: No chief complaint on file.      HPI  I saw this 47 y.o. 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  He came to clinic alone.    He was previously followed by GI at [facility] but has recently been coming to [facility].    He has had some minor variceal bleeds in the past and his last admission was in the beginning of May 2022 at [facility]. Last banded in Aug 2020 but his varices have since been characterized as small.    He does not report any edema or ascites but his imaging did show abdominal fluid.  No episodes of HE.    EGD: 5/3/22  - Grade I esophageal varices.                          - LA Grade A reflux esophagitis with no bleeding.                          - Gastritis.                          - Non-bleeding gastric ulcer with no stigmata of                          bleeding.                          - Duodenitis.     Abdo US: 7/19/22  Morphological changes of cirrhosis.  No focal hepatic lesions.  Sequelae of portal venous hypertension, including patent umbilical vein, reversal of flow of the main and right portal veins, and mild splenomegaly.  Small right pleural effusion    Alcohol hx:  - 1/5 of gin per day  - completed outpatient rehab at [facility] and managed to stay off alcohol for 5 months but relapsed about 1 month ago.  - stopped drinking 2 weeks ago (Late April 2022).    No DUI  No legal troubles with alcohol    Completed [facility] on 10/21/22    No HE or ascites/edema but he remains jaundiced      MELD-Na score: 24 at 1/11/2023  5:52 PM  MELD score: 22 at 1/11/2023  5:52 PM  Calculated from:  Serum Creatinine: 1.8 mg/dL at 1/11/2023 11:44 AM  Serum Sodium: 134 mmol/L at 1/11/2023 11:44 AM  Total Bilirubin: 4.7 mg/dL at 1/11/2023 11:44 AM  INR(ratio): 1.5 at 1/11/2023  5:52 PM  Age: 47 years    PMH:  Colon resection- 2007- mass- not Ca  Diverticulitis  CKD  Hypertension  Hip OA  PE- 2018-  stopped warfarin due to GI bleed      SH:  Disabled  Ex-smoker (1 pack robert day for 3 years in his 20s)  Lives with mother  6 kids- healthy    FH:  Nil liver      Review of Systems   Constitutional:  Negative for activity change, appetite change, chills, fatigue, fever and unexpected weight change.   HENT:  Negative for hearing loss.    Eyes:  Negative for discharge and visual disturbance.   Respiratory:  Negative for cough, chest tightness, shortness of breath and wheezing.    Cardiovascular:  Negative for chest pain, palpitations and leg swelling.   Gastrointestinal:  Negative for abdominal distention, abdominal pain, constipation, diarrhea and nausea.   Genitourinary:  Negative for dysuria and frequency.   Musculoskeletal:  Negative for arthralgias and back pain.   Skin:  Negative for pallor and rash.   Neurological:  Negative for dizziness, tremors, speech difficulty and headaches.   Hematological:  Negative for adenopathy.   Psychiatric/Behavioral:  Negative for agitation and confusion.        Lab Results   Component Value Date    ALT 36 01/11/2023    AST 63 (H) 01/11/2023    GGT 81 (H) 07/21/2022    ALKPHOS 175 (H) 01/11/2023    BILITOT 4.7 (H) 01/11/2023     Past Medical History:   Diagnosis Date    Alcohol withdrawal 5/1/2022    Alcoholic cirrhosis of liver     Alcoholic ketoacidosis 6/6/2021    Arthritis     ATN (acute tubular necrosis)     CHF (congestive heart failure)     Diverticulitis 01/2020    Esophageal varices     GERD (gastroesophageal reflux disease)     GI bleed     Hip arthritis     Left    Hypertension     Liver cirrhosis     Macrocytic anemia     Pulmonary embolism 08/2018    Unprovoked DVT.  Stop Coumadin due to GIB    Thrombocytopenia      Past Surgical History:   Procedure Laterality Date    ANKLE SURGERY      BLOCK, NERVE, PERIPHERAL Left 06/24/2022    Procedure: Left Hip femoral-obturator accessory nerve block;  Surgeon: Robbin De La Garza DO;  Location: [facility] OR;  Service: Pain  Management;  Laterality: Left;    COLON SURGERY  2007    COLONOSCOPY  09/05/2019    [facility]    COLONOSCOPY N/A 02/17/2021    Procedure: COLONOSCOPY;  Surgeon: Renea Billingsley MD;  Location: [facility];  Service: Endoscopy;  Laterality: N/A;    COLONOSCOPY W/ BIOPSIES  02/17/2021    ESOPHAGOGASTRODUODENOSCOPY N/A 07/26/2019    Procedure: EGD (ESOPHAGOGASTRODUODENOSCOPY);  Surgeon: Brian Trivedi MD;  Location: [facility];  Service: Endoscopy;  Laterality: N/A;    ESOPHAGOGASTRODUODENOSCOPY N/A 04/08/2020    Procedure: EGD (ESOPHAGOGASTRODUODENOSCOPY);  Surgeon: Donn Acuna MD;  Location: [facility];  Service: Endoscopy;  Laterality: N/A;    ESOPHAGOGASTRODUODENOSCOPY N/A 01/03/2021    Procedure: EGD (ESOPHAGOGASTRODUODENOSCOPY)- coffee ground emesis, hx varices;  Surgeon: Steve Chairez MD;  Location: [facility];  Service: Endoscopy;  Laterality: N/A;    ESOPHAGOGASTRODUODENOSCOPY N/A 05/03/2021    Procedure: EGD (ESOPHAGOGASTRODUODENOSCOPY);  Surgeon: Jeanmarie Ngo MD;  Location: [facility];  Service: Endoscopy;  Laterality: N/A;    ESOPHAGOGASTRODUODENOSCOPY N/A 07/19/2021    Procedure: ESOPHAGOGASTRODUODENOSCOPY (EGD);  Surgeon: Claudio Medeiros MD;  Location: [facility];  Service: Endoscopy;  Laterality: N/A;    ESOPHAGOGASTRODUODENOSCOPY  12/20/2021    ESOPHAGOGASTRODUODENOSCOPY N/A 12/20/2021    Procedure: EGD (ESOPHAGOGASTRODUODENOSCOPY);  Surgeon: Ajay Jackson MD;  Location: [facility];  Service: Endoscopy;  Laterality: N/A;    ESOPHAGOGASTRODUODENOSCOPY N/A 05/03/2022    Procedure: EGD (ESOPHAGOGASTRODUODENOSCOPY);  Surgeon: Brian Trivedi MD;  Location: [facility];  Service: Endoscopy;  Laterality: N/A;    ESOPHAGOGASTRODUODENOSCOPY N/A 7/7/2022    Procedure: EGD (ESOPHAGOGASTRODUODENOSCOPY);  Surgeon: Ajay Jackson MD;  Location: [facility];  Service: Endoscopy;  Laterality: N/A;    ESOPHAGOGASTRODUODENOSCOPY N/A 11/29/2022    Procedure: EGD (ESOPHAGOGASTRODUODENOSCOPY);  Surgeon:  Edouard Maynard MD;  Location: [facility] ([address] [place] [address]);  Service: Endoscopy;  Laterality: N/A;    HERNIA REPAIR Left     Inguinal    UPPER GASTROINTESTINAL ENDOSCOPY N/A 07/07/2022     Current Outpatient Medications   Medication Sig    acamprosate (CAMPRAL) 333 mg tablet Take 2 tablets (666 mg total) by mouth 3 (three) times daily.    amLODIPine (NORVASC) 10 MG tablet Take 1 tablet (10 mg total) by mouth once daily.    augmented betamethasone dipropionate (DIPROLENE-AF) 0.05 % cream Apply topically 2 (two) times daily.    calcitRIOL (ROCALTROL) 0.25 MCG Cap Take 1 capsule (0.25 mcg total) by mouth once daily.    carvediloL (COREG) 12.5 MG tablet Take 1 tablet (12.5 mg total) by mouth 2 (two) times daily with meals.    clindamycin-benzoyl peroxide (BENZACLIN) gel Apply topically 2 (two) times daily.    ferrous gluconate (FERGON) 240 (27 FE) MG tablet Take 2 tablets (480 mg total) by mouth 2 (two) times daily with meals.    folic acid (FOLVITE) 1 MG tablet Take 1 tablet (1 mg total) by mouth once daily.    furosemide (LASIX) 20 MG tablet Take 1 tablet (20 mg total) by mouth once daily.    lactulose (CHRONULAC) 10 gram/15 mL solution Take 30 mLs (20 g total) by mouth 3 (three) times daily.    nortriptyline (PAMELOR) 25 MG capsule Take 1 capsule (25 mg total) by mouth every evening.    oxyCODONE (ROXICODONE) 5 MG immediate release tablet Take 1 tablet (5 mg total) by mouth 2 (two) times daily as needed for Pain.    pantoprazole (PROTONIX) 40 MG tablet Take 1 tablet (40 mg total) by mouth 2 (two) times daily.    rifAXIMin (XIFAXAN) 550 mg Tab Take 1 tablet (550 mg total) by mouth 2 (two) times daily.    sildenafiL (VIAGRA) 100 MG tablet Take 1 tablet (100 mg total) by mouth daily as needed for Erectile Dysfunction.    spironolactone (ALDACTONE) 50 MG tablet Take 1 tablet (50 mg total) by mouth once daily.    thiamine 100 MG tablet Take 1 tablet (100 mg total) by mouth once daily.     No current facility-administered  medications for this visit.       Objective:      Physical Exam  HENT:      Head: Normocephalic.   Eyes:      General: Scleral icterus present.      Pupils: Pupils are equal, round, and reactive to light.   Neck:      Thyroid: No thyromegaly.   Cardiovascular:      Rate and Rhythm: Normal rate and regular rhythm.      Heart sounds: Normal heart sounds.   Pulmonary:      Effort: Pulmonary effort is normal.      Breath sounds: Normal breath sounds. No wheezing.   Abdominal:      General: There is no distension.      Palpations: Abdomen is soft. There is no mass.      Tenderness: There is no abdominal tenderness.   Lymphadenopathy:      Cervical: No cervical adenopathy.   Skin:     General: Skin is warm.      Findings: No erythema or rash.   Neurological:      Mental Status: He is alert and oriented to person, place, and time.   Psychiatric:         Behavior: Behavior normal.         Assessment:       1. Cirrhosis of liver with ascites, unspecified hepatic cirrhosis type    2. Alcoholic cirrhosis of liver without ascites            Plan:   This 47 year old man has decompensated alcohol related cirrhosis with jaundice and mild ascites. His MELD score despite abstinence from alcohol is 24 driven by his bilirubin and his chronically elevated creatinine.    Once again, I explained to him that his liver function is severely imparied but can recover with prolonged abstinence. Explained that he should aim for lifelong complete abstinence.    - Stopped drinking in mid Aug 2022  - just completed [facility] and is starting aftercare program on Nov 1 2022.    - Left hip fracture 3 years ago with poor healing and dificulty walking  - Walker around the house  - wheelchair for distances    - checked Pet today  - agrees to liver Tx evaluation

## 2023-01-13 NOTE — PLAN OF CARE
01/12/23 1846   Post-Acute Status   Post-Acute Authorization Home Health   Home Health Status Pending Clinical Review   Discharge Delays None known at this time   Discharge Plan   Discharge Plan A Home Health

## 2023-01-14 PROCEDURE — G0180 PR HOME HEALTH MD CERTIFICATION: ICD-10-PCS | Mod: NTX,,, | Performed by: HOSPITALIST

## 2023-01-14 PROCEDURE — G0180 MD CERTIFICATION HHA PATIENT: HCPCS | Mod: NTX,,, | Performed by: HOSPITALIST

## 2023-01-17 LAB
CLINICAL BIOCHEMIST REVIEW: NORMAL
PLPETH BLD-MCNC: <10 NG/ML
POPETH BLD-MCNC: 29 NG/ML

## 2023-01-18 ENCOUNTER — TELEPHONE (OUTPATIENT)
Dept: TRANSPLANT | Facility: CLINIC | Age: 48
End: 2023-01-18

## 2023-01-18 ENCOUNTER — PATIENT MESSAGE (OUTPATIENT)
Dept: SPINE | Facility: CLINIC | Age: 48
End: 2023-01-18
Payer: MEDICARE

## 2023-01-18 DIAGNOSIS — M87.052 AVASCULAR NECROSIS OF BONE OF HIP, LEFT: Primary | ICD-10-CM

## 2023-01-18 RX ORDER — TRAMADOL HYDROCHLORIDE 50 MG/1
50 TABLET ORAL 3 TIMES DAILY PRN
Qty: 90 TABLET | Refills: 0 | Status: SHIPPED | OUTPATIENT
Start: 2023-01-18 | End: 2023-01-25 | Stop reason: SDUPTHER

## 2023-01-22 ENCOUNTER — PATIENT MESSAGE (OUTPATIENT)
Dept: SPINE | Facility: CLINIC | Age: 48
End: 2023-01-22
Payer: MEDICARE

## 2023-01-22 DIAGNOSIS — M87.052 AVASCULAR NECROSIS OF BONE OF HIP, LEFT: ICD-10-CM

## 2023-01-22 DIAGNOSIS — R20.8 ALLODYNIA: ICD-10-CM

## 2023-01-22 DIAGNOSIS — L70.0 ACNE VULGARIS: ICD-10-CM

## 2023-01-23 RX ORDER — CLINDAMYCIN AND BENZOYL PEROXIDE 10; 50 MG/G; MG/G
GEL TOPICAL 2 TIMES DAILY
Qty: 50 G | Refills: 1 | Status: SHIPPED | OUTPATIENT
Start: 2023-01-23 | End: 2024-01-23

## 2023-01-23 RX ORDER — NORTRIPTYLINE HYDROCHLORIDE 25 MG/1
25 CAPSULE ORAL NIGHTLY
Qty: 30 CAPSULE | Refills: 3 | Status: SHIPPED | OUTPATIENT
Start: 2023-01-23 | End: 2023-01-25

## 2023-01-24 ENCOUNTER — TELEPHONE (OUTPATIENT)
Dept: PHARMACY | Facility: CLINIC | Age: 48
End: 2023-01-24
Payer: MEDICARE

## 2023-01-25 ENCOUNTER — TELEPHONE (OUTPATIENT)
Dept: TRANSPLANT | Facility: CLINIC | Age: 48
End: 2023-01-25

## 2023-01-25 ENCOUNTER — TELEPHONE (OUTPATIENT)
Dept: TRANSPLANT | Facility: CLINIC | Age: 48
End: 2023-01-25
Payer: MEDICARE

## 2023-01-25 ENCOUNTER — OFFICE VISIT (OUTPATIENT)
Dept: SPINE | Facility: CLINIC | Age: 48
End: 2023-01-25
Payer: MEDICARE

## 2023-01-25 DIAGNOSIS — R20.8 ALLODYNIA: ICD-10-CM

## 2023-01-25 DIAGNOSIS — Z76.82 ORGAN TRANSPLANT CANDIDATE: Primary | ICD-10-CM

## 2023-01-25 DIAGNOSIS — M87.052 AVASCULAR NECROSIS OF BONE OF HIP, LEFT: Primary | ICD-10-CM

## 2023-01-25 PROCEDURE — 99213 OFFICE O/P EST LOW 20 MIN: CPT | Mod: PBBFAC,NTX | Performed by: STUDENT IN AN ORGANIZED HEALTH CARE EDUCATION/TRAINING PROGRAM

## 2023-01-25 PROCEDURE — 99999 PR PBB SHADOW E&M-EST. PATIENT-LVL III: CPT | Mod: PBBFAC,,, | Performed by: STUDENT IN AN ORGANIZED HEALTH CARE EDUCATION/TRAINING PROGRAM

## 2023-01-25 PROCEDURE — 99214 OFFICE O/P EST MOD 30 MIN: CPT | Mod: S$PBB,,, | Performed by: STUDENT IN AN ORGANIZED HEALTH CARE EDUCATION/TRAINING PROGRAM

## 2023-01-25 PROCEDURE — 99214 PR OFFICE/OUTPT VISIT, EST, LEVL IV, 30-39 MIN: ICD-10-PCS | Mod: S$PBB,,, | Performed by: STUDENT IN AN ORGANIZED HEALTH CARE EDUCATION/TRAINING PROGRAM

## 2023-01-25 PROCEDURE — 99999 PR PBB SHADOW E&M-EST. PATIENT-LVL III: ICD-10-PCS | Mod: PBBFAC,,, | Performed by: STUDENT IN AN ORGANIZED HEALTH CARE EDUCATION/TRAINING PROGRAM

## 2023-01-25 RX ORDER — TRAMADOL HYDROCHLORIDE 50 MG/1
50 TABLET ORAL 3 TIMES DAILY PRN
Qty: 90 TABLET | Refills: 0 | Status: SHIPPED | OUTPATIENT
Start: 2023-02-17 | End: 2023-04-03

## 2023-01-25 RX ORDER — TRAMADOL HYDROCHLORIDE 50 MG/1
50 TABLET ORAL 3 TIMES DAILY PRN
Qty: 90 TABLET | Refills: 0 | Status: SHIPPED | OUTPATIENT
Start: 2023-03-19 | End: 2023-05-03

## 2023-01-25 RX ORDER — NORTRIPTYLINE HYDROCHLORIDE 50 MG/1
50 CAPSULE ORAL NIGHTLY PRN
Qty: 90 CAPSULE | Refills: 1 | Status: SHIPPED | OUTPATIENT
Start: 2023-01-25 | End: 2023-09-13

## 2023-01-25 NOTE — PROGRESS NOTES
Chronic Pain - f/u    Referring Physician: No ref. provider found    Date: 01/25/2023     Re: Irvin Diaz Jr.  MR#: [id number]  YOB: 1975  Age: 47 y.o.    Chief Complaint: hip pain  Chief Complaint   Patient presents with    Knee Pain     L    Leg Pain     L    Hip Pain     L     **This note is dictated using the M*Modal Fluency Direct word recognition program. There are word recognition mistakes that are occasionally missed on review.**    ASSESSMENT: 47 y.o. year old male with left hip pain, consistent with     1. Avascular necrosis of bone of hip, left  traMADoL (ULTRAM) 50 mg tablet    traMADoL (ULTRAM) 50 mg tablet    nortriptyline (PAMELOR) 50 MG capsule      2. Allodynia  nortriptyline (PAMELOR) 50 MG capsule            PLAN:     Avascular necrosis of the left hip   -s/p hip block without significant pain relief and complicated by Hematoma.   -Tramadol ineffective (discontinued)  -tolerating oxycodone 5 mg BID PRN (he thinks his lucid dreams were due to EtOH withdrawal). No more lucid dreams. Switch back to tramadol  -switched back to tramadol because he has been doing better and does not think that he needs the oxycodone anymore. Refill through April.  - Medrol dose pack, helped while taking, but not sustained.  -not surgical candidate  -Not a candidate for further procedures due to bleeding risk  - Plts improved to ~80, discussed possibility of   -buprenorphine likely not an option 2/2 to his ESLD and liver transplant status    Allodynia  -increase Nortriptyline to 50 mg nightly. Helpful. Helps him sleep    Thorombocytopenia 2/2 cirrhosis  -platelets are92 on 5/26/22, 82 on 9/7/22, 74 on 10/25/22  -Avanos recommended above 50, but he had hematoma, so will not proceed.    Chronic Opioid Use  - UDS from October and September did not show opioids but he states he was decreasing use at that time.  -should get new UDS at next visit to verify taking medications appropriately.    Cirrhosis 2/2  past EtOH. He states he is not drinking anymore.  -following with hepatology  -possible liver transplant?  -working with psychiatry for EtOH.    Kidney disease  -GFR 54    - RTC before 4/18/23 for me refill  - Counseled patient regarding the importance of  activity modification.    The above plan and management options were discussed at length with patient. Patient is in agreement with the above and verbalized understanding. It will be communicated with the referring physician via electronic record, fax, or mail.  Lab/study reports reviewed were important and necessary because subsequent medical and treatment recommendations required review of the above lab/study reports. Images viewed/reviewed above were important and necessary because subsequent medical and treatment recommendations required review of the reviewed image(s).     Electronically signed by:  Robbin De La Garza DO  01/25/2023    =========================================================================================================    SUBJECTIVE:    Interval History 1/25/2023:     Irvin WOOD Joe Gudino is a 47 y.o. male presents to the clinic for follow up.  Since last visit the pain has has moderately improved. Reports that he went on a cruise and noticed his pain was doing better.  He has stopped using the cane because it has been better.  He went back to tramadol because he did not think he required the oxycodone. Pain 8/10 today. Feels like a tolerable 8.    Current pain medications: Tramadol 50mg TID    Failed Pain Medications: oxycodone, tramadol, gabapentin, cannot take NSAIDs due to gastric bleeding, cannot take tylenol due to liver failure.     Pain procedures:  6/24/22 - left hip block - no relief. C/b hematoma formation    Interval History 10/31/2022:     Irvin WOOD Joe Arellano. is a 47 y.o. male presents to the clinic for follow up.  Since last visit the pain has has significantly worsened.    The pain is located in the L hip area and  radiates to the L foot .  The pain is described as aching, dull, shooting, stabbing, tight band, and tingling    At BEST  8/10   At WORST  10/10 on the WORST day.    On average pain is rated as 7/10.   Today the pain is rated as 8/10  Symptoms interfere with daily activity, sleeping, and work.   Exacerbating factors: Standing, Bending, Coughing/Sneezing, Walking, and Getting out of bed/chair.    Mitigating factors nothing and medications.     Interval History 9/7/2022:     Irvin Diaz Jr. is a 47 y.o. male presents to the clinic for follow up.  Since last visit the pain has has worsened. He is now using a walker full time for the last 2 weeks. In a wheelchair today. He is unable to describe if his limited mobility is due to just pain or maybe a component of weakness.     The pain is located in the L hip area and radiates to the L foot .  The pain is described as aching, shooting, and stabbing    At BEST  7/10   At WORST  10/10 on the WORST day.    On average pain is rated as 8/10.   Today the pain is rated as 10/10  Symptoms interfere with daily activity, sleeping, and work.   Exacerbating factors: Standing, Laying, Bending, Walking, Lifting, and Getting out of bed/chair.    Mitigating factors none.     Interval History 8/2/2022:     Irvin Diaz Jr. is a 47 y.o. male presents to the clinic for follow up.  Since last visit the pain has is unchanged.  He stopped the Oxycodone because it gave severe, lucid dreams.  Ran out of tramadol and has not been taking anything for his pain.    The pain is located in the left hip area and radiates to the left knee/ groin .  The pain is described as aching, shooting and stabbing    At BEST  8/10   At WORST  10/10 on the WORST day.    On average pain is rated as 8/10.   Today the pain is rated as 8/10  Symptoms interfere with daily activity, sleeping and work.   Exacerbating factors: Standing, Walking and Getting out of bed/chair.    Mitigating factors nothing, heat,  ice, medications and rest.     Interval History 7/5/2022:     Irvin Diaz Jr. is a 47 y.o. male presents to the clinic for follow up.  Since last visit the pain has is unchanged.  He is s/p hip block that was complicated by hematoma in the pectineus muscle.  His Hgb has dropped, although the hematoma is resolving.  Recommended that patient go to ED as recommended by transplant.    The pain is located in the left hip area and radiates to the left leg/ groin  .  The pain is described as aching, shooting and stabbing    At BEST  8/10   At WORST  10/10 on the WORST day.    On average pain is rated as 8/10.   Today the pain is rated as 8/10  Symptoms interfere with daily activity, sleeping and work.   Exacerbating factors: Standing, Walking and Getting out of bed/chair.    Mitigating factors nothing, heat, ice, medications and rest.     Interval History 5/30/2022:     Irvin Diaz Jr. is a 47 y.o. male presents to the clinic for follow up.  Since last visit the pain has is unchanged. He missed his original procedure date due to not checking his voicemail and transportation issues. He is still interested in the procedure.  WE had a long talk about bleeding risk with his low platelets and high INR.    The pain is located in the left hip area and radiates to the left leg/groin .  The pain is described as aching, shooting and stabbing    At BEST  8/10   At WORST  10/10 on the WORST day.    On average pain is rated as 8/10.   Today the pain is rated as 8/10  Symptoms interfere with daily activity, sleeping and work.   Exacerbating factors: walking.    Mitigating factors nothing.     Initial Hx:  Irvin Diaz Jr. is a 47 y.o. male presents to the clinic for the evaluation of left hip pain. The pain started 3 years ago following fall and symptoms have been worsening. The patient states used to get hip injections of the left hip.  He is unable to walk without a cane.  He states that he was told after imaging  that he had a fracture in 2019.  He had an injection (and aspiration) in December/january and it helps the pain for about 3 weeks.  After the injections he still needs the pain but it helps.  The ortho physician at [facility]    CT note about the hip:  There is collapse of the left femoral head superior portion with bone-on-bone as well as underlying femoral sclerotic changes suggesting AVN with severe secondary degenerative changes of the acetabulum and the left hip effusion stable since prior exam.     The patient says that he has seen a ortho surgeon in the past and that they are unable to do surgery due to his liver failure.    Pain Description:    The pain is located in the left hip area and radiates to the left leg/ groin area .    At BEST  8/10   At WORST  10/10 on the WORST day.    On average pain is rated as 8/10.   Today the pain is rated as 8/10  The pain is continuous.  The pain is described as aching, shooting and throbbing    Symptoms interfere with daily activity, sleeping and work.   Exacerbating factors: Standing, Laying, Bending, Walking, Night Time, Morning, Flexing, Lifting and Getting out of bed/chair.    Mitigating factors laying down and medications.   He reports 5 hours of sleep per night.    Physical Therapy/Home Exercise: No, not currently in physical therapy or home exercise program    Current Pain Medications:    - none    Failed Pain Medications:    - cannot take NSAIDs due to gastric bleeding, cannot take tylenol due to liver failure.     Pain Treatment Therapies:    Pain procedures: hip injections  Physical Therapy: physical therapy made things worse  Chiropractor: none  Acupuncture: none  TENS unit: none  Spinal decompression: none  Joint replacement: none    Patient denies urinary incontinence, bowel incontinence and loss of sensations. (+) weakness in the left leg  Patient denies any suicidal or homicidal ideations     report:  Reviewed and consistent with  medication use as prescribed.    Imaging:   XR abdomen 03/2022:  Please note the hemidiaphragms are not included in their entirety.  Multiple surgical clips and presumed anastomotic suture line project over the right abdomen.  There are a few mildly prominent loops of gas-filled small bowel loops present measuring up to 3.3 cm in the left abdomen.  Limited evaluation of free intraperitoneal air to patient positioning/technique.  Calcifications project over the pelvis, likely phleboliths.  Osseous structures demonstrate significant degenerative change of the left hip with chronic appearing deformity/collapse of the left femoral head.    CT abdomen 03/2022:  Mild circumferential wall thickening involving the proximal colon extending from the ileocolic anastomosis at the hepatic flexure through around the level of the splenic flexure suggests inflammatory or infectious colitis.  No convincing transmural inflammation is seen.     Postoperative changes of proximal colectomy and scattered mild diverticulosis of the more distal colon.  No convincing diverticulitis, bowel obstruction, free air or abscess.     Findings of cirrhosis and mild abdominal ascites as described essentially stable.     Left hip findings suggesting AVN as described.     This report was flagged in Epic as abnormal.     No other significant or acute findings.       Past Medical History:   Diagnosis Date    Alcohol withdrawal 5/1/2022    Alcoholic cirrhosis of liver     Alcoholic ketoacidosis 6/6/2021    Arthritis     ATN (acute tubular necrosis)     CHF (congestive heart failure)     Diverticulitis 01/2020    Esophageal varices     GERD (gastroesophageal reflux disease)     GI bleed     Hip arthritis     Left    Hypertension     Liver cirrhosis     Macrocytic anemia     Pulmonary embolism 08/2018    Unprovoked DVT.  Stop Coumadin due to GIB    Thrombocytopenia      Past Surgical History:   Procedure Laterality Date    ANKLE SURGERY      BLOCK, NERVE,  PERIPHERAL Left 06/24/2022    Procedure: Left Hip femoral-obturator accessory nerve block;  Surgeon: Robbin De La Garza DO;  Location: [facility];  Service: Pain Management;  Laterality: Left;    COLON SURGERY  2007    COLONOSCOPY  09/05/2019    [facility]    COLONOSCOPY N/A 02/17/2021    Procedure: COLONOSCOPY;  Surgeon: Renea Billingsley MD;  Location: [facility];  Service: Endoscopy;  Laterality: N/A;    COLONOSCOPY W/ BIOPSIES  02/17/2021    ESOPHAGOGASTRODUODENOSCOPY N/A 07/26/2019    Procedure: EGD (ESOPHAGOGASTRODUODENOSCOPY);  Surgeon: Brian Trivedi MD;  Location: [facility];  Service: Endoscopy;  Laterality: N/A;    ESOPHAGOGASTRODUODENOSCOPY N/A 04/08/2020    Procedure: EGD (ESOPHAGOGASTRODUODENOSCOPY);  Surgeon: Donn Acuna MD;  Location: [facility];  Service: Endoscopy;  Laterality: N/A;    ESOPHAGOGASTRODUODENOSCOPY N/A 01/03/2021    Procedure: EGD (ESOPHAGOGASTRODUODENOSCOPY)- coffee ground emesis, hx varices;  Surgeon: Steve Chairez MD;  Location: [facility];  Service: Endoscopy;  Laterality: N/A;    ESOPHAGOGASTRODUODENOSCOPY N/A 05/03/2021    Procedure: EGD (ESOPHAGOGASTRODUODENOSCOPY);  Surgeon: Jeanmarie Ngo MD;  Location: [facility];  Service: Endoscopy;  Laterality: N/A;    ESOPHAGOGASTRODUODENOSCOPY N/A 07/19/2021    Procedure: ESOPHAGOGASTRODUODENOSCOPY (EGD);  Surgeon: Claudio Medeiros MD;  Location: [facility];  Service: Endoscopy;  Laterality: N/A;    ESOPHAGOGASTRODUODENOSCOPY  12/20/2021    ESOPHAGOGASTRODUODENOSCOPY N/A 12/20/2021    Procedure: EGD (ESOPHAGOGASTRODUODENOSCOPY);  Surgeon: Ajay Jackson MD;  Location: [facility];  Service: Endoscopy;  Laterality: N/A;    ESOPHAGOGASTRODUODENOSCOPY N/A 05/03/2022    Procedure: EGD (ESOPHAGOGASTRODUODENOSCOPY);  Surgeon: Brian Trivedi MD;  Location: [facility];  Service: Endoscopy;  Laterality: N/A;    ESOPHAGOGASTRODUODENOSCOPY N/A 7/7/2022    Procedure: EGD (ESOPHAGOGASTRODUODENOSCOPY);  Surgeon: Ajay Jackson MD;   Location: [facility];  Service: Endoscopy;  Laterality: N/A;    ESOPHAGOGASTRODUODENOSCOPY N/A 2022    Procedure: EGD (ESOPHAGOGASTRODUODENOSCOPY);  Surgeon: Edouard Maynard MD;  Location: [facility] ([address] [place] [address]);  Service: Endoscopy;  Laterality: N/A;    HERNIA REPAIR Left     Inguinal    UPPER GASTROINTESTINAL ENDOSCOPY N/A 2022     Social History     Socioeconomic History    Marital status: Legally    Tobacco Use    Smoking status: Former     Types: Cigarettes     Quit date:      Years since quittin.0    Smokeless tobacco: Never    Tobacco comments:     quit 20 years ago   Substance and Sexual Activity    Alcohol use: Not Currently     Comment: Quit drinking 21    Drug use: Not Currently     Types: Marijuana    Sexual activity: Yes     Comment: occ     Social Determinants of Health     Financial Resource Strain: Low Risk     Difficulty of Paying Living Expenses: Not very hard   Food Insecurity: No Food Insecurity    Worried About Running Out of Food in the Last Year: Never true    Ran Out of Food in the Last Year: Never true   Transportation Needs: No Transportation Needs    Lack of Transportation (Medical): No    Lack of Transportation (Non-Medical): No   Physical Activity: Inactive    Days of Exercise per Week: 0 days    Minutes of Exercise per Session: 0 min   Stress: No Stress Concern Present    Feeling of Stress : Not at all   Social Connections: Moderately Integrated    Frequency of Communication with Friends and Family: More than three times a week    Frequency of Social Gatherings with Friends and Family: More than three times a week    Attends [employer] Services: Never    Active Member of Clubs or Organizations: Yes    Attends Club or Organization Meetings: Never    Marital Status:    Housing Stability: Low Risk     Unable to Pay for Housing in the Last Year: No    Number of Places Lived in the Last Year: 1    Unstable Housing in the Last Year: No     Family History    Problem Relation Age of Onset    Hypertension Mother     Breast cancer Mother     Hypertension Father     Prostate cancer Father     Bladder Cancer Father        Review of patient's allergies indicates:   Allergen Reactions    Ciprofloxacin hcl Hallucinations    Meperidine Hives     Pt medicated w/multiple medications (demerol, protonix, and zofran)  w/i 10 mins time frame prior to developing localized hives near IV site that med was administered. Pt reports he has/takes all other medications on daily basis.     Morphine Itching    Nsaids (non-steroidal anti-inflammatory drug)      Kidney Disease    Tylenol [acetaminophen]      Hx of liver disease       Current Outpatient Medications   Medication Sig    acamprosate (CAMPRAL) 333 mg tablet Take 2 tablets (666 mg total) by mouth 3 (three) times daily.    amLODIPine (NORVASC) 10 MG tablet Take 1 tablet (10 mg total) by mouth once daily.    augmented betamethasone dipropionate (DIPROLENE-AF) 0.05 % cream Apply topically 2 (two) times daily.    calcitRIOL (ROCALTROL) 0.25 MCG Cap Take 1 capsule (0.25 mcg total) by mouth once daily.    carvediloL (COREG) 12.5 MG tablet Take 1 tablet (12.5 mg total) by mouth 2 (two) times daily with meals.    clindamycin-benzoyl peroxide (BENZACLIN) gel Apply topically 2 (two) times daily.    ferrous gluconate (FERGON) 240 (27 FE) MG tablet Take 2 tablets (480 mg total) by mouth 2 (two) times daily with meals.    folic acid (FOLVITE) 1 MG tablet Take 1 tablet (1 mg total) by mouth once daily.    furosemide (LASIX) 20 MG tablet Take 1 tablet (20 mg total) by mouth once daily.    lactulose (CHRONULAC) 10 gram/15 mL solution Take 30 mLs (20 g total) by mouth 3 (three) times daily. Take enough to have 2 to 3 bowel movements a day to prevent hepatic encephalopathy.    pantoprazole (PROTONIX) 40 MG tablet Take 1 tablet (40 mg total) by mouth 2 (two) times daily.    rifAXIMin (XIFAXAN) 550 mg Tab Take 1 tablet (550 mg total) by mouth 2 (two)  times daily.    sildenafiL (VIAGRA) 100 MG tablet Take 1 tablet (100 mg total) by mouth daily as needed for Erectile Dysfunction.    spironolactone (ALDACTONE) 50 MG tablet Take 1 tablet (50 mg total) by mouth once daily.    thiamine 100 MG tablet Take 1 tablet (100 mg total) by mouth once daily.    nortriptyline (PAMELOR) 50 MG capsule Take 1 capsule (50 mg total) by mouth nightly as needed (pain and insomnia).    [START ON 2/17/2023] traMADoL (ULTRAM) 50 mg tablet Take 1 tablet (50 mg total) by mouth 3 (three) times daily as needed for Pain.    [START ON 3/19/2023] traMADoL (ULTRAM) 50 mg tablet Take 1 tablet (50 mg total) by mouth 3 (three) times daily as needed for Pain.     No current facility-administered medications for this visit.       REVIEW OF SYSTEMS:    GENERAL:  No weight loss, malaise or fevers.   HEENT:   No recent changes in vision or hearing   NECK:  Negative for lumps, no difficulty with swallowing.  RESPIRATORY:  Negative for cough, wheezing or shortness of breath, patient denies any recent URI.  CARDIOVASCULAR:  Negative for chest pain, leg swelling or palpitations.  GI:  Negative for abdominal discomfort, blood in stools or black stools or change in bowel habits.  MUSCULOSKELETAL:  See HPI.  SKIN:  Negative for lesions, rash, and itching. + skin itching  PSYCH:  No mood disorder or recent psychosocial stressors.  Patients sleep is not disturbed secondary to pain.  HEMATOLOGY/LYMPHOLOGY:  Negative for prolonged bleeding, bruising easily or swollen nodes.  Patient is not currently taking any anti-coagulants  NEURO:   No history of headaches, syncope, paralysis, seizures or tremors.  All other reviewed and negative other than HPI.    OBJECTIVE:    BP (!) (P) 148/83   Pulse (!) (P) 5     PHYSICAL EXAMINATION:    GENERAL: Fraile, in no acute distress, alert and oriented x3.  PSYCH:  Mood and affect appropriate.  SKIN: Skin color, texture, turgor normal, no rashes or lesions on visible  skin.  HEAD/FACE:  Normocephalic, atraumatic. Cranial nerves grossly intact.  CV: RRR with palpation of the radial artery.  PULM: CTAB. No evidence of respiratory difficulty, symmetric chest rise.  GI:  Soft    MUSKULOSKELETAL:    EXTREMITIES:   Left Hip Exam (limited ROM and exam 2/2/ pain)  - Log Roll Positive  - FADIR Positive  - Stinchfield Unable to Perform  - Hip Scour Unable to Perform  - GTB Tenderness Positive   -TTP over anterior hip joint. Posterior not palpated  - TTP of the superior knee joint.    MUSCULOSKELETAL:  Atrophy of the left leg compared to the right  No deformities, edema, or skin discoloration are noted on visible skin. Good capillary refill.     NEURO: Bilateral upper and lower extremity coordination and muscle stretch reflexes are physiologic and symmetric.      NEUROLOGICAL EXAM:  MENTAL STATUS: A x O x 3, good concentration, speech is fluent and goal directed  MEMORY: recent and remote are intact  CN: CN2-12 grossly intact  MOTOR: 5/5 in all muscle groups on the right. HF 3/5 on the left, 4/5 KE, 4/5 KF with pain  DTRs: 3+ intact symmetric patella and 2 + achilles  Sensation:    -yes Loss of sensation in a left lower L-1 and L-2 on the left distribution.  Babinski: absent     Gait: Cane, abnormal. Short stride. Favors left leg

## 2023-01-25 NOTE — TELEPHONE ENCOUNTER
Informed pt he will be re-presented to the committee for liver transplant. He has a few things to update, labs, US, tox screen and SW follow up. Appt card given to . Understanding stated.

## 2023-01-31 ENCOUNTER — HOSPITAL ENCOUNTER (OUTPATIENT)
Dept: RADIOLOGY | Facility: HOSPITAL | Age: 48
Discharge: HOME | End: 2023-01-31
Attending: INTERNAL MEDICINE
Payer: MEDICARE

## 2023-01-31 ENCOUNTER — SOCIAL WORK (OUTPATIENT)
Dept: TRANSPLANT | Facility: CLINIC | Age: 48
End: 2023-01-31
Payer: MEDICARE

## 2023-01-31 ENCOUNTER — OFFICE VISIT (OUTPATIENT)
Dept: PSYCHIATRY | Facility: CLINIC | Age: 48
End: 2023-01-31
Payer: MEDICARE

## 2023-01-31 VITALS
WEIGHT: 194.44 LBS | HEART RATE: 87 BPM | BODY MASS INDEX: 26.37 KG/M2 | DIASTOLIC BLOOD PRESSURE: 75 MMHG | SYSTOLIC BLOOD PRESSURE: 150 MMHG

## 2023-01-31 DIAGNOSIS — F10.21 ALCOHOL USE DISORDER, SEVERE, IN EARLY REMISSION: Primary | ICD-10-CM

## 2023-01-31 DIAGNOSIS — Z76.82 ORGAN TRANSPLANT CANDIDATE: ICD-10-CM

## 2023-01-31 PROCEDURE — 93975 VASCULAR STUDY: CPT | Mod: 26,TXP,, | Performed by: STUDENT IN AN ORGANIZED HEALTH CARE EDUCATION/TRAINING PROGRAM

## 2023-01-31 PROCEDURE — 76700 US ABDOMEN COMP WITH DOPPLER (XPD): ICD-10-PCS | Mod: 26,TXP,, | Performed by: STUDENT IN AN ORGANIZED HEALTH CARE EDUCATION/TRAINING PROGRAM

## 2023-01-31 PROCEDURE — 76700 US EXAM ABDOM COMPLETE: CPT | Mod: 26,TXP,, | Performed by: STUDENT IN AN ORGANIZED HEALTH CARE EDUCATION/TRAINING PROGRAM

## 2023-01-31 PROCEDURE — 99999 PR PBB SHADOW E&M-EST. PATIENT-LVL I: ICD-10-PCS | Mod: PBBFAC,TXP,, | Performed by: NURSE PRACTITIONER

## 2023-01-31 PROCEDURE — 99213 OFFICE O/P EST LOW 20 MIN: CPT | Mod: S$PBB,NTX,, | Performed by: NURSE PRACTITIONER

## 2023-01-31 PROCEDURE — 93975 VASCULAR STUDY: CPT | Mod: TC,TXP

## 2023-01-31 PROCEDURE — 99999 PR PBB SHADOW E&M-EST. PATIENT-LVL I: CPT | Mod: PBBFAC,TXP,, | Performed by: NURSE PRACTITIONER

## 2023-01-31 PROCEDURE — 93975 US ABDOMEN COMP WITH DOPPLER (XPD): ICD-10-PCS | Mod: 26,TXP,, | Performed by: STUDENT IN AN ORGANIZED HEALTH CARE EDUCATION/TRAINING PROGRAM

## 2023-01-31 PROCEDURE — 99213 PR OFFICE/OUTPT VISIT, EST, LEVL III, 20-29 MIN: ICD-10-PCS | Mod: S$PBB,NTX,, | Performed by: NURSE PRACTITIONER

## 2023-01-31 PROCEDURE — 99211 OFF/OP EST MAY X REQ PHY/QHP: CPT | Mod: PBBFAC,25,TXP | Performed by: NURSE PRACTITIONER

## 2023-01-31 PROCEDURE — 76700 US EXAM ABDOM COMPLETE: CPT | Mod: TC,TXP

## 2023-01-31 NOTE — PROGRESS NOTES
"Outpatient Psychiatry Follow-Up Visit (MD/NP)    1/31/2023    Clinical Status of Patient:  Outpatient (Ambulatory)    Chief Complaint:  Irvin Diaz Jr. is a 47 y.o. male who presents today for follow-up of  alcohol use disorder .  Met with patient.      Interval History and Content of Current Session:    "I haven't been drinking."    Patient reports that things have been going well. Denies major social changes since previous appointment. Remains sober, denies cravings/urges, no uncomfortable using dreams. Patient states that sustained sobriety has come from changing his environment, "I just don't hang out where I hung out before." Patient is not engaged with formal sober supports at this time, no group, no AA, no individual counseling. Spends most of his time with family, especially his younger sons and grandchildren. Presents with euthymic mood and affect. Denies depression. Denies anxiety concerns. No eladio. No psychosis (reports previous hallucinations with elevated ammonia earlier this month). Reports poor sleep, nortriptyline dose recently adjusted, hopeful this will help.    HPI/Psychiatric Review Of Systems (is patient experiencing or having changes in):    Mood: euthymic mood and affect  Anhedonia/interest: denies  Guilt/hopelessness: denies  Concentration: no concerns expressed  Sleep: poor, dose of nortriptyline recently increased by pain management  Energy: adequate  Appetite: adequate, weight relatively stable, 194 lb today  SI/SIB/VI/HI: denies all  Anxiety/panic: denies concerns  Paranoia: denies  AVH: denies  Substance use: non-smoker. No ETOH use, remains sober. No illicit substance use.    Medications:    Acamprosate 666 mg PO TID -- not taking    Nortriptyline 50 mg nightly (prescribed for pain/insomnia)    Brief synopsis:  Reports continued sobriety       Review of Systems   PSYCHIATRIC: Pertinant items are noted in the narrative.  CONSTITUTIONAL: No weight gain or loss.   MUSCULOSKELETAL: " Positive for hip pain.  NEUROLOGIC: No weakness, sensory changes, seizures, confusion, memory loss, tremor or other abnormal movements.  GASTROINTESTINAL: No nausea, vomiting, pain, constipation or diarrhea.    Past Medical, Family and Social History: The patient's past medical, family and social history have been reviewed and updated as appropriate within the electronic medical record - see encounter notes.    Compliance: see above    Side effects: see above    Risk Parameters:  Patient reports no suicidal ideation  Patient reports no homicidal ideation  Patient reports no self-injurious behavior  Patient reports no violent behavior    Exam (detailed: at least 9 elements; comprehensive: all 15 elements)   Constitutional  Vitals:  Most recent vital signs, dated less than 90 days prior to this appointment, were reviewed.   Vitals:    01/31/23 1002   BP: (!) 150/75   Pulse: 87   Weight: 88.2 kg (194 lb 7.1 oz)        General:  unremarkable, age appropriate, groomed     Musculoskeletal  Muscle Strength/Tone:  no spasicity, no rigidity, no cogwheeling, no flaccidity   Gait & Station:  in wheelchair     Psychiatric  Speech:  no latency; no press   Mood & Affect:  euthymic  congruent and appropriate, full   Thought Process:  normal and logical   Associations:  intact   Thought Content:  normal, no suicidality, no homicidality, delusions, or paranoia   Insight:  intact   Judgement: behavior is adequate to circumstances   Orientation:  grossly intact   Memory: intact for content of interview   Language: grossly intact   Attention Span & Concentration:  able to focus   Fund of Knowledge:  intact and appropriate to age and level of education     Assessment and Diagnosis   Status/Progress: Based on the examination today, the patient's problem(s) is/are well controlled.  New problems have not been presented today.   Co-morbidities, Diagnostic uncertainty, and Lack of compliance are not complicating management of the primary  condition.  There are no active rule-out diagnoses for this patient at this time.     General Impression: Irvin Diaz Jr. is a 47 y.o. male with a psychiatric hx of severe alcohol use disorder, currently in early remission. Medical hx is significant for cirrhosis, avascular necrosis of L hip, thrombocytopenia, hypertension. No formal psychiatric hx. Completed recovery IOP through [facility] in October 2022. No hx of psychiatric hospitalizations. No hx of suicide attempts or violence. Lives with his parents. On medical disability       ICD-10-CM ICD-9-CM   1. Alcohol use disorder, severe, in early remission  F10.21 303.93         Intervention/Counseling/Treatment Plan   Reviewed patient's symptoms and medication regimen  Patient denies interest in continuing acamprosate at this time  Encouraged him to keep prescription and resume taking with any urges/cravings  Patient reports interest in re-engaging with AA  Strongly encourage this  Reviewed continued importance of avoiding settings that coincide with previous drinking    Medication Management  Prescription drug management was employed during the encounter, as medications were prescribed, or considered but not prescribed.   [facility] reviewed  The risks and benefits of medication were discussed with the patient.  Possible expectable adverse effects of any current or proposed individual psychotropic agents were discussed with this patient.  Counseling was provided on the importance of full compliance with any prescribed medication.  Detailed instructions were provided to the patient regarding the administration of any prescribed medication.  Patient voiced understanding      Return to Clinic: as needed    Face-to-face time spent: 20 minutes  25 minutes total time spent. This includes face to face time and non-face to face time preparing to see the patient (eg, review of tests), obtaining and/or reviewing separately obtained history, documenting clinical  information in the electronic or other health record, independently interpreting results and communicating results to the patient/family/caregiver, or care coordinator.

## 2023-01-31 NOTE — PROGRESS NOTES
"  Please see previous social work notes for continuity.  TRUDY met with patient at clinic to follow up on Substance Abuse Relapse Prevention Treatment progress.  Patient came alone to this appointment.  Per patient, he finished IOP with [facility] in November and has not been participating in AA Meetings or other groups at this time.    TRUDY informed patient about his positive Peth of 29 from 1/13/23.  Patient said that he was aware of the result and did not believe it.  He accused this  of "making up the numbers".  TRUDY requested patient to participate in AA Meetings again and patient then said that he has been actually attending meetings.  Patient agreed to start keeping a meeting log that he will bring to his next appointment.  TRUDY will assist as needed.  "

## 2023-02-02 ENCOUNTER — TELEPHONE (OUTPATIENT)
Dept: TRANSPLANT | Facility: CLINIC | Age: 48
End: 2023-02-02
Payer: MEDICARE

## 2023-02-02 ENCOUNTER — CLINICAL SUPPORT (OUTPATIENT)
Dept: ENDOSCOPY | Facility: HOSPITAL | Age: 48
End: 2023-02-02
Payer: MEDICARE

## 2023-02-02 VITALS — HEIGHT: 72 IN | WEIGHT: 194 LBS | BODY MASS INDEX: 26.28 KG/M2

## 2023-02-02 DIAGNOSIS — Z12.11 SCREENING FOR COLON CANCER: Primary | ICD-10-CM

## 2023-02-02 DIAGNOSIS — K92.1 MELENA: ICD-10-CM

## 2023-02-02 RX ORDER — LIDOCAINE HYDROCHLORIDE 10 MG/ML
INJECTION INFILTRATION; PERINEURAL
Status: DISPENSED
Start: 2023-02-02 | End: 2023-02-03

## 2023-02-02 RX ORDER — POLYETHYLENE GLYCOL 3350, SODIUM SULFATE ANHYDROUS, SODIUM BICARBONATE, SODIUM CHLORIDE, POTASSIUM CHLORIDE 236; 22.74; 6.74; 5.86; 2.97 G/4L; G/4L; G/4L; G/4L; G/4L
4 POWDER, FOR SOLUTION ORAL ONCE
Qty: 4000 ML | Refills: 0 | Status: SHIPPED | OUTPATIENT
Start: 2023-02-02 | End: 2023-02-02

## 2023-02-02 NOTE — TELEPHONE ENCOUNTER
Pt did not have labs done when he was here 1/31 to complete testing needed to be represented. Pt states he is going out of town today and will not be back until 2/15. Pt will have labs drawn 2/16. Pt will be presented 2/22/23 Pt is aware he can not be presented until after labs are completed.

## 2023-02-09 ENCOUNTER — EXTERNAL HOME HEALTH (OUTPATIENT)
Dept: HOME HEALTH SERVICES | Facility: HOSPITAL | Age: 48
End: 2023-02-09
Payer: MEDICARE

## 2023-02-23 ENCOUNTER — TELEPHONE (OUTPATIENT)
Dept: TRANSPLANT | Facility: CLINIC | Age: 48
End: 2023-02-23
Payer: MEDICARE

## 2023-02-27 ENCOUNTER — TELEPHONE (OUTPATIENT)
Dept: TRANSPLANT | Facility: CLINIC | Age: 48
End: 2023-02-27
Payer: MEDICARE

## 2023-02-27 NOTE — TELEPHONE ENCOUNTER
Called to discuss cancelled appts 2/16 to complete evaluation, left message on pt's v/m requesting a return call.

## 2023-03-03 ENCOUNTER — PATIENT MESSAGE (OUTPATIENT)
Dept: ENDOSCOPY | Facility: HOSPITAL | Age: 48
End: 2023-03-03
Payer: MEDICARE

## 2023-03-03 DIAGNOSIS — K92.1 MELENA: Primary | ICD-10-CM

## 2023-03-03 RX ORDER — POLYETHYLENE GLYCOL 3350, SODIUM SULFATE ANHYDROUS, SODIUM BICARBONATE, SODIUM CHLORIDE, POTASSIUM CHLORIDE 236; 22.74; 6.74; 5.86; 2.97 G/4L; G/4L; G/4L; G/4L; G/4L
4 POWDER, FOR SOLUTION ORAL ONCE
Qty: 4000 ML | Refills: 0 | Status: SHIPPED | OUTPATIENT
Start: 2023-03-03 | End: 2023-03-10

## 2023-03-06 ENCOUNTER — TELEPHONE (OUTPATIENT)
Dept: TRANSPLANT | Facility: CLINIC | Age: 48
End: 2023-03-06
Payer: MEDICARE

## 2023-03-06 NOTE — TELEPHONE ENCOUNTER
Pt states he is out of town taking care of his grandson until maybe mid April. Discussed with pt importance of completing evaluation for transplant candidacy. Pt states she he is aware. Pt started re evaluation 2-2/3 was due to complete 2/16. Pt was out of town. He needs hepatology follow up with Dr. Pérez tox screen, pt is scheduled for repeat colon 3/10 number given to call and reschedule. Informed pt to call as soon as he knows he will return to [place] to complete eval. Pt is aware he can not be considered for transplant until all eval testing is complete. Understanding stated.

## 2023-03-10 ENCOUNTER — ANESTHESIA EVENT (OUTPATIENT)
Dept: ENDOSCOPY | Facility: HOSPITAL | Age: 48
End: 2023-03-10
Payer: MEDICARE

## 2023-03-10 ENCOUNTER — HOSPITAL ENCOUNTER (OUTPATIENT)
Facility: HOSPITAL | Age: 48
Discharge: HOME | End: 2023-03-10
Attending: INTERNAL MEDICINE | Admitting: INTERNAL MEDICINE
Payer: MEDICARE

## 2023-03-10 ENCOUNTER — ANESTHESIA (OUTPATIENT)
Dept: ENDOSCOPY | Facility: HOSPITAL | Age: 48
End: 2023-03-10
Payer: MEDICARE

## 2023-03-10 ENCOUNTER — DOCUMENT SCAN (OUTPATIENT)
Dept: HOME HEALTH SERVICES | Facility: HOSPITAL | Age: 48
End: 2023-03-10
Payer: MEDICARE

## 2023-03-10 VITALS
HEART RATE: 76 BPM | OXYGEN SATURATION: 100 % | TEMPERATURE: 98 F | WEIGHT: 195 LBS | BODY MASS INDEX: 26.41 KG/M2 | HEIGHT: 72 IN | DIASTOLIC BLOOD PRESSURE: 77 MMHG | SYSTOLIC BLOOD PRESSURE: 163 MMHG | RESPIRATION RATE: 16 BRPM

## 2023-03-10 DIAGNOSIS — K92.1 MELENA: Primary | ICD-10-CM

## 2023-03-10 PROCEDURE — E9220 PRA ENDO ANESTHESIA: ICD-10-PCS | Mod: NTX,,, | Performed by: NURSE ANESTHETIST, CERTIFIED REGISTERED

## 2023-03-10 PROCEDURE — 37000009 HC ANESTHESIA EA ADD 15 MINS: Mod: TXP | Performed by: INTERNAL MEDICINE

## 2023-03-10 PROCEDURE — 45378 PR COLONOSCOPY,DIAGNOSTIC: ICD-10-PCS | Mod: NTX,,, | Performed by: INTERNAL MEDICINE

## 2023-03-10 PROCEDURE — 45378 DIAGNOSTIC COLONOSCOPY: CPT | Mod: NTX,,, | Performed by: INTERNAL MEDICINE

## 2023-03-10 PROCEDURE — 37000008 HC ANESTHESIA 1ST 15 MINUTES: Mod: NTX | Performed by: INTERNAL MEDICINE

## 2023-03-10 PROCEDURE — 45378 DIAGNOSTIC COLONOSCOPY: CPT | Mod: TXP | Performed by: INTERNAL MEDICINE

## 2023-03-10 PROCEDURE — 25000003 PHARM REV CODE 250: Mod: TXP | Performed by: NURSE ANESTHETIST, CERTIFIED REGISTERED

## 2023-03-10 PROCEDURE — E9220 PRA ENDO ANESTHESIA: HCPCS | Mod: NTX,,, | Performed by: NURSE ANESTHETIST, CERTIFIED REGISTERED

## 2023-03-10 PROCEDURE — 63600175 PHARM REV CODE 636 W HCPCS: Mod: NTX | Performed by: NURSE ANESTHETIST, CERTIFIED REGISTERED

## 2023-03-10 RX ORDER — LIDOCAINE HYDROCHLORIDE 20 MG/ML
INJECTION, SOLUTION EPIDURAL; INFILTRATION; INTRACAUDAL; PERINEURAL
Status: DISCONTINUED | OUTPATIENT
Start: 2023-03-10 | End: 2023-03-10

## 2023-03-10 RX ORDER — SODIUM CHLORIDE 9 MG/ML
INJECTION, SOLUTION INTRAVENOUS CONTINUOUS
Status: DISCONTINUED | OUTPATIENT
Start: 2023-03-10 | End: 2023-03-10 | Stop reason: HOSPADM

## 2023-03-10 RX ORDER — PROPOFOL 10 MG/ML
VIAL (ML) INTRAVENOUS
Status: DISCONTINUED | OUTPATIENT
Start: 2023-03-10 | End: 2023-03-10

## 2023-03-10 RX ORDER — PROPOFOL 10 MG/ML
VIAL (ML) INTRAVENOUS CONTINUOUS PRN
Status: DISCONTINUED | OUTPATIENT
Start: 2023-03-10 | End: 2023-03-10

## 2023-03-10 RX ADMIN — LIDOCAINE HYDROCHLORIDE 100 MG: 20 INJECTION, SOLUTION EPIDURAL; INFILTRATION; INTRACAUDAL; PERINEURAL at 03:03

## 2023-03-10 RX ADMIN — Medication 175 MCG/KG/MIN: at 03:03

## 2023-03-10 RX ADMIN — SODIUM CHLORIDE: 0.9 INJECTION, SOLUTION INTRAVENOUS at 03:03

## 2023-03-10 RX ADMIN — PROPOFOL 100 MG: 10 INJECTION, EMULSION INTRAVENOUS at 03:03

## 2023-03-10 NOTE — TRANSFER OF CARE
Anesthesia Transfer of Care Note    Patient: Irvin Diaz     Procedure(s) Performed: Procedure(s) (LRB):  COLONOSCOPY (N/A)    Patient location: PACU    Anesthesia Type: general    Transport from OR: Transported from OR on room air with adequate spontaneous ventilation    Post pain: adequate analgesia    Post assessment: no apparent anesthetic complications    Post vital signs: stable    Level of consciousness: awake    Nausea/Vomiting: no nausea/vomiting    Complications: none    Transfer of care protocol was followed      Last vitals:   Visit Vitals  /84 (BP Location: Left arm, Patient Position: Lying)   Pulse 88   Temp 37 °C (98.6 °F) (Temporal)   Resp 16   Ht 6' (1.829 m)   Wt 88.5 kg (195 lb)   SpO2 100%   BMI 26.45 kg/m²

## 2023-03-10 NOTE — PROVATION PATIENT INSTRUCTIONS
Discharge Summary/Instructions after an Endoscopic Procedure  Patient Name: Irvin Macias  Patient MRN: [id number]  Patient YOB: 1975  Friday, March 10, 2023  Rudy Orellana MD  Dear patient,  As a result of recent federal legislation (The Federal Cures Act), you may   receive lab or pathology results from your procedure in your [facility]   account before your physician is able to contact you. Your physician or   their representative will relay the results to you with their   recommendations at their soonest availability.  Thank you,  RESTRICTIONS:  During your procedure today, you received medications for sedation.  These   medications may affect your judgment, balance and coordination.  Therefore,   for 24 hours, you have the following restrictions:   - DO NOT drive a car, operate machinery, make legal/financial decisions,   sign important papers or drink alcohol.    ACTIVITY:  Today: no heavy lifting, straining or running due to procedural   sedation/anesthesia.  The following day: return to full activity including work.  DIET:  Eat and drink normally unless instructed otherwise.     TREATMENT FOR COMMON SIDE EFFECTS:  - Mild abdominal pain, nausea, belching, bloating or excessive gas:  rest,   eat lightly and use a heating pad.  - Sore Throat: treat with throat lozenges and/or gargle with warm salt   water.  - Because air was used during the procedure, expelling large amounts of air   from your rectum or belching is normal.  - If a bowel prep was taken, you may not have a bowel movement for 1-3 days.    This is normal.  SYMPTOMS TO WATCH FOR AND REPORT TO YOUR PHYSICIAN:  1. Abdominal pain or bloating, other than gas cramps.  2. Chest pain.  3. Back pain.  4. Signs of infection such as: chills or fever occurring within 24 hours   after the procedure.  5. Rectal bleeding, which would show as bright red, maroon, or black stools.   (A tablespoon of blood from the rectum is not serious, especially if    hemorrhoids are present.)  6. Vomiting.  7. Weakness or dizziness.  GO DIRECTLY TO THE NEAREST EMERGENCY ROOM IF YOU HAVE ANY OF THE FOLLOWING:      Difficulty breathing              Chills and/or fever over 101 F   Persistent vomiting and/or vomiting blood   Severe abdominal pain   Severe chest pain   Black, tarry stools   Bleeding- more than one tablespoon   Any other symptom or condition that you feel may need urgent attention  Your doctor recommends these additional instructions:  If any biopsies were taken, your doctors clinic will contact you in 1 to 2   weeks with any results.  - Patient has a contact number available for emergencies.  The signs and   symptoms of potential delayed complications were discussed with the   patient.  Return to normal activities tomorrow.  Written discharge   instructions were provided to the patient.   - Discharge patient to home.   - Repeat colonoscopy in 10 years for screening purposes.   - The findings and recommendations were discussed with the designated   responsible adult.  For questions, problems or results please call your physician - Rudy Orellana MD at Work:  [phone].  [facility] [place], EMERGENCY ROOM PHONE NUMBER: [phone]  IF A COMPLICATION OR EMERGENCY SITUATION ARISES AND YOU ARE UNABLE TO REACH   YOUR PHYSICIAN - GO DIRECTLY TO THE EMERGENCY ROOM.  Rudy Orellana MD  3/10/2023 3:52:16 PM  This report has been verified and signed electronically.  Dear patient,  As a result of recent federal legislation (The Federal Cures Act), you may   receive lab or pathology results from your procedure in your [facility]   account before your physician is able to contact you. Your physician or   their representative will relay the results to you with their   recommendations at their soonest availability.  Thank you,  PROVATION

## 2023-03-10 NOTE — ANESTHESIA POSTPROCEDURE EVALUATION
Anesthesia Post Evaluation    Patient: Irvin Diaz     Procedure(s) Performed: Procedure(s) (LRB):  COLONOSCOPY (N/A)    Final Anesthesia Type: general      Patient location during evaluation: PACU  Patient participation: Yes- Able to Participate  Level of consciousness: awake and alert  Post-procedure vital signs: reviewed and stable  Pain management: adequate  Airway patency: patent    PONV status at discharge: No PONV  Anesthetic complications: no      Cardiovascular status: blood pressure returned to baseline  Respiratory status: unassisted  Hydration status: euvolemic  Follow-up not needed.          Vitals Value Taken Time   /77 03/10/23 1622   Temp 36.7 °C (98 °F) 03/10/23 1552   Pulse 76 03/10/23 1622   Resp 16 03/10/23 1622   SpO2 100 % 03/10/23 1622         Event Time   Out of Recovery 16:35:55         Pain/Leno Score: Leno Score: 10 (3/10/2023  4:22 PM)

## 2023-03-10 NOTE — ANESTHESIA PREPROCEDURE EVALUATION
[facility]  Anesthesia Pre-Operative Evaluation       Patient Name: Irvin Diaz Jr.  YOB: 1975  MRN: [id number]  [facility]: [id number]      Code Status: Prior   Date of Procedure: 3/10/2023  Anesthesia: Choice Procedure: Procedure(s) (LRB):  COLONOSCOPY (N/A)  Pre-Operative Diagnosis: Melena [K92.1]  Proceduralist: Surgeon(s) and Role:     * Rudy Orellana MD - Primary Nurse: (Unknown)      SUBJECTIVE:   Irvin Diaz Jr. is a 47 y.o. male who  has a past medical history of Alcohol withdrawal (5/1/2022), Alcoholic cirrhosis of liver, Alcoholic ketoacidosis (6/6/2021), Arthritis, ATN (acute tubular necrosis), CHF (congestive heart failure), Diverticulitis (01/2020), Esophageal varices, GERD (gastroesophageal reflux disease), GI bleed, Hip arthritis, Hypertension, Liver cirrhosis, Macrocytic anemia, Pulmonary embolism (08/2018), and Thrombocytopenia. No notes on file    No current facility-administered medications for this encounter.       he has a current medication list which includes the following long-term medication(s): acamprosate, amlodipine, augmented betamethasone dipropionate, carvedilol, clindamycin-benzoyl peroxide, folic acid, furosemide, nortriptyline, pantoprazole, sildenafil, and spironolactone.   ALLERGIES:     Review of patient's allergies indicates:   Allergen Reactions    Ciprofloxacin hcl Hallucinations    Meperidine Hives     Pt medicated w/multiple medications (demerol, protonix, and zofran)  w/i 10 mins time frame prior to developing localized hives near IV site that med was administered. Pt reports he has/takes all other medications on daily basis.     Morphine Itching    Nsaids (non-steroidal anti-inflammatory drug)      Kidney Disease    Tylenol [acetaminophen]      Hx of liver disease     LDA:      Lines/Drains/Airways     None               MEDICATIONS:     Antibiotics (From admission, onward)    None        VTE Risk Mitigation (From admission, onward)     None        No current facility-administered medications for this encounter.     Current Outpatient Medications   Medication Sig Dispense Refill    acamprosate (CAMPRAL) 333 mg tablet Take 2 tablets (666 mg total) by mouth 3 (three) times daily. 180 tablet 11    amLODIPine (NORVASC) 10 MG tablet Take 1 tablet (10 mg total) by mouth once daily. 30 tablet 11    augmented betamethasone dipropionate (DIPROLENE-AF) 0.05 % cream Apply topically 2 (two) times daily. 50 g 1    calcitRIOL (ROCALTROL) 0.25 MCG Cap Take 1 capsule (0.25 mcg total) by mouth once daily. 30 capsule 1    carvediloL (COREG) 12.5 MG tablet Take 1 tablet (12.5 mg total) by mouth 2 (two) times daily with meals. 60 tablet 11    clindamycin-benzoyl peroxide (BENZACLIN) gel Apply topically 2 (two) times daily. 50 g 1    ferrous gluconate (FERGON) 240 (27 FE) MG tablet Take 2 tablets (480 mg total) by mouth 2 (two) times daily with meals. 120 tablet 3    folic acid (FOLVITE) 1 MG tablet Take 1 tablet (1 mg total) by mouth once daily. 30 tablet 5    furosemide (LASIX) 20 MG tablet Take 1 tablet (20 mg total) by mouth once daily. 30 tablet 11    lactulose (CHRONULAC) 10 gram/15 mL solution Take 30 mLs (20 g total) by mouth 3 (three) times daily. Take enough to have 2 to 3 bowel movements a day to prevent hepatic encephalopathy. 2700 mL 11    nortriptyline (PAMELOR) 50 MG capsule Take 1 capsule (50 mg total) by mouth nightly as needed (pain and insomnia). 90 capsule 1    pantoprazole (PROTONIX) 40 MG tablet Take 1 tablet (40 mg total) by mouth 2 (two) times daily. 60 tablet 11    polyethylene glycol (GOLYTELY) 236-22.74-6.74 -5.86 gram suspension Take 4,000 mLs (4 L total) by mouth once. for 1 dose 4000 mL 0    rifAXIMin (XIFAXAN) 550 mg Tab Take 1 tablet (550 mg total) by mouth 2 (two) times daily. 60 tablet 11    sildenafiL (VIAGRA) 100 MG tablet Take 1 tablet (100 mg total) by mouth daily as needed for Erectile Dysfunction. 30 tablet 5     spironolactone (ALDACTONE) 50 MG tablet Take 1 tablet (50 mg total) by mouth once daily. 30 tablet 11    thiamine 100 MG tablet Take 1 tablet (100 mg total) by mouth once daily. 30 tablet 5    traMADoL (ULTRAM) 50 mg tablet Take 1 tablet (50 mg total) by mouth 3 (three) times daily as needed for Pain. 90 tablet 0    [START ON 3/19/2023] traMADoL (ULTRAM) 50 mg tablet Take 1 tablet (50 mg total) by mouth 3 (three) times daily as needed for Pain. 90 tablet 0          History:   There are no hospital problems to display for this patient.    Surgical History:    has a past surgical history that includes Hernia repair (Left); Colon surgery (2007); Esophagogastroduodenoscopy (N/A, 07/26/2019); Ankle surgery; Colonoscopy (09/05/2019); Esophagogastroduodenoscopy (N/A, 04/08/2020); Esophagogastroduodenoscopy (N/A, 01/03/2021); Colonoscopy w/ biopsies (02/17/2021); Colonoscopy (N/A, 02/17/2021); Esophagogastroduodenoscopy (N/A, 05/03/2021); Esophagogastroduodenoscopy (N/A, 07/19/2021); Esophagogastroduodenoscopy (12/20/2021); Esophagogastroduodenoscopy (N/A, 12/20/2021); Esophagogastroduodenoscopy (N/A, 05/03/2022); block, nerve, peripheral (Left, 06/24/2022); Upper gastrointestinal endoscopy (N/A, 07/07/2022); Esophagogastroduodenoscopy (N/A, 7/7/2022); Esophagogastroduodenoscopy (N/A, 11/29/2022); and Colonoscopy (N/A, 2/13/2023).   Social History:    reports being sexually active.  reports that he quit smoking about 23 years ago. His smoking use included cigarettes. He has never used smokeless tobacco. He reports that he does not currently use alcohol. He reports that he does not currently use drugs after having used the following drugs: Marijuana.     OBJECTIVE:     Vital Signs (Most Recent):    Vital Signs Range (Last 24H):          There is no height or weight on file to calculate BMI.   Wt Readings from Last 4 Encounters:   02/02/23 88 kg (194 lb)   01/13/23 89.9 kg (198 lb 3.1 oz)   01/11/23 91.6 kg (202 lb)    01/05/23 91.7 kg (202 lb 2.6 oz)     Significant Labs:  Lab Results   Component Value Date    WBC 6.69 01/11/2023    HGB 11.3 (L) 01/11/2023    HCT 34.8 (L) 01/11/2023    PLT 84 (L) 01/11/2023     (L) 01/11/2023    K 3.5 01/11/2023     01/11/2023    CREATININE 1.8 (H) 01/11/2023    BUN 13 01/11/2023    CO2 22 (L) 01/11/2023     (H) 01/11/2023    CALCIUM 9.0 01/11/2023    MG 2.0 11/30/2022    PHOS 2.7 03/11/2022    ALKPHOS 175 (H) 01/11/2023    ALT 36 01/11/2023    AST 63 (H) 01/11/2023    ALBUMIN 2.9 (L) 01/11/2023    INR 1.5 (H) 01/11/2023    APTT 29.6 06/26/2022    HGBA1C 5.7 11/12/2020    CPK 63 11/19/2020    CPKMB 0.8 11/19/2020    TROPONINI 0.055 (H) 03/10/2022     (H) 07/18/2022     No LMP for male patient.  No results found for this or any previous visit (from the past 72 hour(s)).    EKG:   Results for orders placed or performed in visit on 11/25/22   EKG 12-lead    Collection Time: 11/25/22  7:51 PM    Narrative    Test Reason :     Vent. Rate : 084 BPM     Atrial Rate : 084 BPM     P-R Int : 166 ms          QRS Dur : 104 ms      QT Int : 384 ms       P-R-T Axes : 045 040 002 degrees     QTc Int : 453 ms    Normal sinus rhythm  Normal ECG  When compared with ECG of 22-JUL-2022 13:06,  Previous ECG has undetermined rhythm, needs review  Incomplete right bundle branch block is no longer Present  Confirmed by DAVID SCOTT MD ([id number]) on 11/26/2022 8:25:16 AM    Referred By: [email or web address]   SELF           Confirmed By:DAVID SCOTT MD       TTE:  Results for orders placed or performed during the hospital encounter of 07/18/22   Echo Saline Bubble? No   Result Value Ref Range    BSA 2.1 m2    TDI SEPTAL 0.07 m/s    LV LATERAL E/E' RATIO 9.56 m/s    LV SEPTAL E/E' RATIO 12.29 m/s    LA WIDTH 4.41 cm    EF 63 %    TDI LATERAL 0.09 m/s    LVIDd 5.79 3.5 - 6.0 cm    IVS 0.86 0.6 - 1.1 cm    Posterior Wall 1.09 0.6 - 1.1 cm    LVIDs 3.67 2.1 - 4.0 cm    FS 37 28 - 44 %    LA volume 77.79  cm3    Sinus 3.34 cm    STJ 3.09 cm    Ascending aorta 3.30 cm    LV mass 224.89 g    LA size 4.14 cm    RVDD 3.90 cm    TAPSE 3.05 cm    RV S' 15.23 cm/s    Left Ventricle Relative Wall Thickness 0.38 cm    AV mean gradient 4 mmHg    AV valve area 3.03 cm2    AV Velocity Ratio 0.80     AV index (prosthetic) 0.88     E/A ratio 1.13     Mean e' 0.08 m/s    E wave deceleration time 413.93 msec    LVOT diameter 2.10 cm    LVOT area 3.5 cm2    LVOT peak cole 1.06 m/s    LVOT peak VTI 23.10 cm    Ao peak cole 1.32 m/s    Ao VTI 26.38 cm    LVOT stroke volume 79.97 cm3    AV peak gradient 7 mmHg    E/E' ratio 10.75 m/s    MV Peak E Cole 0.86 m/s    MV Peak A Cole 0.76 m/s    LV Systolic Volume 56.90 mL    LV Systolic Volume Index 27.2 mL/m2    LV Diastolic Volume 165.83 mL    LV Diastolic Volume Index 79.34 mL/m2    LA Volume Index 37.2 mL/m2    LV Mass Index 108 g/m2    RA Major Axis 4.88 cm    Left Atrium Minor Axis 4.90 cm    Left Atrium Major Axis 5.13 cm    LA Volume Index (Mod) 40.5 mL/m2    LA volume (mod) 84.61 cm3    RA Width 2.61 cm    Narrative    · The left ventricle is mildly enlarged with normal systolic function.  · The estimated ejection fraction is 63%.  · Indeterminate left ventricular diastolic function.  · Normal right ventricular size with normal right ventricular systolic   function.  · Mild left atrial enlargement.  · Mild right atrial enlargement.  · Trivial posterior pericardial effusion. And outside the RA.        EF   Date Value Ref Range Status   07/19/2022 63 % Final   03/11/2022 60 % Final     Nuc Stress EF   Date Value Ref Range Status   07/22/2022 58 % Final     Nuc Rest EF   Date Value Ref Range Status   07/22/2022 58  Final      No results found for this or any previous visit.  ALLAN:  No results found for this or any previous visit.  Stress Test:  Results for orders placed during the hospital encounter of 07/22/22    Nuclear Stress - Cardiology Interpreted    Interpretation Summary    Normal  myocardial perfusion scan. There is no evidence of myocardial ischemia or infarction.    The gated perfusion images showed an ejection fraction of 58% at rest. The gated perfusion images showed an ejection fraction of 58% post stress. Normal ejection fraction is greater than 53%.    There is normal wall motion at rest and post stress.    LV cavity size is normal at rest and normal at stress.    The EKG portion of this study is negative for ischemia.    The patient reported no chest pain during the stress test.    There were no arrhythmias during stress.    When compared to the previous study from 11/7/2020, there are no significant changes.     LHC:  No results found for this or any previous visit.     PFT:  No results found for: FEV1, FVC, ICK8TAU, TLC, DLCO   ASSESSMENT/PLAN:         Pre-op Assessment    I have reviewed the Patient Summary Reports.     I have reviewed the Nursing Notes.    I have reviewed the Medications.     Review of Systems  Anesthesia Hx:  No problems with previous Anesthesia    Hematology/Oncology:  Hematology Normal   Oncology Normal     EENT/Dental:EENT/Dental Normal   Cardiovascular:   Exercise tolerance: good Hypertension CHF    Pulmonary:  Pulmonary Normal    Renal/:   Chronic Renal Disease    Hepatic/GI:   GERD Liver Disease,    Musculoskeletal:  Musculoskeletal Normal    Neurological:  Neurology Normal    Endocrine:  Endocrine Normal    Dermatological:  Skin Normal    Psych:   Psychiatric History          Physical Exam  General: Well nourished    Airway:  Mallampati: III / II  Mouth Opening: Normal  TM Distance: Normal  Tongue: Normal  Neck ROM: Normal ROM    Dental:  Intact        Anesthesia Plan  Type of Anesthesia, risks & benefits discussed:    Anesthesia Type: Gen ETT, Gen Natural Airway  Intra-op Monitoring Plan: Standard ASA Monitors  Induction:  IV  Informed Consent: Informed consent signed with the Patient and all parties understand the risks and agree with anesthesia  plan.  All questions answered.   ASA Score: 3    Ready For Surgery From Anesthesia Perspective.     .

## 2023-03-10 NOTE — H&P
Short Stay Endoscopy History and Physical    PCP - Edouard Gibson MD    Procedure - Colonoscopy  ASA - per anesthesia  Mallampati - per anesthesia  History of Anesthesia problems - no  Family history Anesthesia problems - no   Plan of anesthesia - General    HPI:  This is a 47 y.o. male here for evaluation of : rectal bleeding. HAd melena in hospital with negative EGD. HAs anastomosis      ROS:  Constitutional: No fevers, chills, No weight loss  CV: No chest pain  Pulm: No cough, No shortness of breath  GI: see HPI  Derm: No rash    Medical History:  has a past medical history of Alcohol withdrawal (5/1/2022), Alcoholic cirrhosis of liver, Alcoholic ketoacidosis (6/6/2021), Arthritis, ATN (acute tubular necrosis), CHF (congestive heart failure), Diverticulitis (01/2020), Esophageal varices, GERD (gastroesophageal reflux disease), GI bleed, Hip arthritis, Hypertension, Liver cirrhosis, Macrocytic anemia, Pulmonary embolism (08/2018), and Thrombocytopenia.    Surgical History:  has a past surgical history that includes Hernia repair (Left); Colon surgery (2007); Esophagogastroduodenoscopy (N/A, 07/26/2019); Ankle surgery; Colonoscopy (09/05/2019); Esophagogastroduodenoscopy (N/A, 04/08/2020); Esophagogastroduodenoscopy (N/A, 01/03/2021); Colonoscopy w/ biopsies (02/17/2021); Colonoscopy (N/A, 02/17/2021); Esophagogastroduodenoscopy (N/A, 05/03/2021); Esophagogastroduodenoscopy (N/A, 07/19/2021); Esophagogastroduodenoscopy (12/20/2021); Esophagogastroduodenoscopy (N/A, 12/20/2021); Esophagogastroduodenoscopy (N/A, 05/03/2022); block, nerve, peripheral (Left, 06/24/2022); Upper gastrointestinal endoscopy (N/A, 07/07/2022); Esophagogastroduodenoscopy (N/A, 7/7/2022); Esophagogastroduodenoscopy (N/A, 11/29/2022); and Colonoscopy (N/A, 2/13/2023).    Family History: family history includes Bladder Cancer in his father; Breast cancer in his mother; Hypertension in his father and mother; Prostate cancer in his  father.. Otherwise no colon cancer, inflammatory bowel disease, or GI malignancies.    Social History:  reports that he quit smoking about 23 years ago. His smoking use included cigarettes. He has never used smokeless tobacco. He reports that he does not currently use alcohol. He reports that he does not currently use drugs.    Review of patient's allergies indicates:   Allergen Reactions    Ciprofloxacin hcl Hallucinations    Meperidine Hives     Pt medicated w/multiple medications (demerol, protonix, and zofran)  w/i 10 mins time frame prior to developing localized hives near IV site that med was administered. Pt reports he has/takes all other medications on daily basis.     Morphine Itching    Nsaids (non-steroidal anti-inflammatory drug)      Kidney Disease    Tylenol [acetaminophen]      Hx of liver disease       Medications:   Medications Prior to Admission   Medication Sig Dispense Refill Last Dose    acamprosate (CAMPRAL) 333 mg tablet Take 2 tablets (666 mg total) by mouth 3 (three) times daily. 180 tablet 11 3/9/2023    amLODIPine (NORVASC) 10 MG tablet Take 1 tablet (10 mg total) by mouth once daily. 30 tablet 11 3/9/2023    calcitRIOL (ROCALTROL) 0.25 MCG Cap Take 1 capsule (0.25 mcg total) by mouth once daily. 30 capsule 1 3/9/2023    carvediloL (COREG) 12.5 MG tablet Take 1 tablet (12.5 mg total) by mouth 2 (two) times daily with meals. 60 tablet 11 3/9/2023    ferrous gluconate (FERGON) 240 (27 FE) MG tablet Take 2 tablets (480 mg total) by mouth 2 (two) times daily with meals. 120 tablet 3 Past Week    folic acid (FOLVITE) 1 MG tablet Take 1 tablet (1 mg total) by mouth once daily. 30 tablet 5 Past Week    furosemide (LASIX) 20 MG tablet Take 1 tablet (20 mg total) by mouth once daily. 30 tablet 11 Past Week    lactulose (CHRONULAC) 10 gram/15 mL solution Take 30 mLs (20 g total) by mouth 3 (three) times daily. Take enough to have 2 to 3 bowel movements a day to prevent hepatic encephalopathy. 2700  mL 11 3/9/2023    nortriptyline (PAMELOR) 50 MG capsule Take 1 capsule (50 mg total) by mouth nightly as needed (pain and insomnia). 90 capsule 1 3/9/2023    pantoprazole (PROTONIX) 40 MG tablet Take 1 tablet (40 mg total) by mouth 2 (two) times daily. 60 tablet 11 3/9/2023    rifAXIMin (XIFAXAN) 550 mg Tab Take 1 tablet (550 mg total) by mouth 2 (two) times daily. 60 tablet 11 3/9/2023    spironolactone (ALDACTONE) 50 MG tablet Take 1 tablet (50 mg total) by mouth once daily. 30 tablet 11 3/9/2023    thiamine 100 MG tablet Take 1 tablet (100 mg total) by mouth once daily. 30 tablet 5 3/9/2023    traMADoL (ULTRAM) 50 mg tablet Take 1 tablet (50 mg total) by mouth 3 (three) times daily as needed for Pain. 90 tablet 0 Past Week    augmented betamethasone dipropionate (DIPROLENE-AF) 0.05 % cream Apply topically 2 (two) times daily. 50 g 1     clindamycin-benzoyl peroxide (BENZACLIN) gel Apply topically 2 (two) times daily. 50 g 1     polyethylene glycol (GOLYTELY) 236-22.74-6.74 -5.86 gram suspension Take 4,000 mLs (4 L total) by mouth once. for 1 dose 4000 mL 0     sildenafiL (VIAGRA) 100 MG tablet Take 1 tablet (100 mg total) by mouth daily as needed for Erectile Dysfunction. 30 tablet 5 More than a month    [START ON 3/19/2023] traMADoL (ULTRAM) 50 mg tablet Take 1 tablet (50 mg total) by mouth 3 (three) times daily as needed for Pain. 90 tablet 0          Physical Exam:    Vital Signs:   Vitals:    03/10/23 1517   BP: (!) 175/92   Pulse: 88   Resp: 16   Temp: 98.6 °F (37 °C)       General Appearance: Well appearing in no acute distress  Eyes:    No scleral icterus  ENT: Neck supple, Lips, mucosa, and tongue normal; teeth and gums normal  Abdomen: Soft, non tender, non distended with positive bowel sounds. No hepatosplenomegaly, ascites, or mass.  Extremities: 2+ pulses, no clubbing, cyanosis or edema  Skin: No rash      Labs:  Lab Results   Component Value Date    WBC 6.69 01/11/2023    HGB 11.3 (L) 01/11/2023     HCT 34.8 (L) 01/11/2023    PLT 84 (L) 01/11/2023    CHOL 146 08/15/2022    TRIG 90 08/15/2022    HDL 30 (L) 08/15/2022    ALT 36 01/11/2023    AST 63 (H) 01/11/2023     (L) 01/11/2023    K 3.5 01/11/2023     01/11/2023    CREATININE 1.8 (H) 01/11/2023    BUN 13 01/11/2023    CO2 22 (L) 01/11/2023    TSH 1.676 08/15/2022    PSA 1.2 07/21/2022    INR 1.5 (H) 01/11/2023    HGBA1C 5.7 11/12/2020       I have explained the risks and benefits of endoscopy procedures to the patient including but not limited to bleeding, perforation, infection, and death.  The patient was asked if they understand and allowed to ask any further questions to their satisfaction.    Rudy Orellana MD

## 2023-04-28 ENCOUNTER — ANESTHESIA (OUTPATIENT)
Dept: SURGERY | Facility: HOSPITAL | Age: 48
DRG: 378 | End: 2023-04-28
Payer: MEDICARE

## 2023-04-28 ENCOUNTER — ANESTHESIA EVENT (OUTPATIENT)
Dept: SURGERY | Facility: HOSPITAL | Age: 48
DRG: 378 | End: 2023-04-28
Payer: MEDICARE

## 2023-04-28 ENCOUNTER — HOSPITAL ENCOUNTER (INPATIENT)
Facility: HOSPITAL | Age: 48
LOS: 1 days | Discharge: HOME | DRG: 378 | End: 2023-04-29
Attending: EMERGENCY MEDICINE | Admitting: STUDENT IN AN ORGANIZED HEALTH CARE EDUCATION/TRAINING PROGRAM
Payer: MEDICARE

## 2023-04-28 DIAGNOSIS — R10.9 ABDOMINAL PAIN: ICD-10-CM

## 2023-04-28 DIAGNOSIS — K92.2 GI BLEED: ICD-10-CM

## 2023-04-28 DIAGNOSIS — K70.30 ALCOHOLIC CIRRHOSIS OF LIVER WITHOUT ASCITES: ICD-10-CM

## 2023-04-28 DIAGNOSIS — K92.0 GASTROINTESTINAL HEMORRHAGE WITH HEMATEMESIS: Primary | ICD-10-CM

## 2023-04-28 DIAGNOSIS — I85.10 SECONDARY ESOPHAGEAL VARICES WITHOUT BLEEDING: ICD-10-CM

## 2023-04-28 DIAGNOSIS — G93.40 ENCEPHALOPATHY: ICD-10-CM

## 2023-04-28 LAB
ABO + RH BLD: NORMAL
ALBUMIN SERPL BCP-MCNC: 3.2 G/DL (ref 3.5–5.2)
ALP SERPL-CCNC: 151 U/L (ref 55–135)
ALT SERPL W/O P-5'-P-CCNC: 24 U/L (ref 10–44)
ANION GAP SERPL CALC-SCNC: 10 MMOL/L (ref 8–16)
APTT PPP: 29.3 SEC (ref 21–32)
AST SERPL-CCNC: 57 U/L (ref 10–40)
BASOPHILS # BLD AUTO: 0.03 K/UL (ref 0–0.2)
BASOPHILS NFR BLD: 0.4 % (ref 0–1.9)
BILIRUB SERPL-MCNC: 4.4 MG/DL (ref 0.1–1)
BLD GP AB SCN CELLS X3 SERPL QL: NORMAL
BNP SERPL-MCNC: 48 PG/ML (ref 0–99)
BUN SERPL-MCNC: 19 MG/DL (ref 6–20)
CALCIUM SERPL-MCNC: 8.6 MG/DL (ref 8.7–10.5)
CHLORIDE SERPL-SCNC: 105 MMOL/L (ref 95–110)
CO2 SERPL-SCNC: 22 MMOL/L (ref 23–29)
CREAT SERPL-MCNC: 1.7 MG/DL (ref 0.5–1.4)
DIFFERENTIAL METHOD: ABNORMAL
EOSINOPHIL # BLD AUTO: 0 K/UL (ref 0–0.5)
EOSINOPHIL NFR BLD: 0.2 % (ref 0–8)
ERYTHROCYTE [DISTWIDTH] IN BLOOD BY AUTOMATED COUNT: 15.7 % (ref 11.5–14.5)
EST. GFR  (NO RACE VARIABLE): 49.1 ML/MIN/1.73 M^2
GLUCOSE SERPL-MCNC: 100 MG/DL (ref 70–110)
HCT VFR BLD AUTO: 35.4 % (ref 40–54)
HGB BLD-MCNC: 10.4 G/DL (ref 14–18)
HGB BLD-MCNC: 11.5 G/DL (ref 14–18)
HGB BLD-MCNC: 12 G/DL (ref 14–18)
HGB BLD-MCNC: 12.8 G/DL (ref 14–18)
IMM GRANULOCYTES # BLD AUTO: 0.06 K/UL (ref 0–0.04)
IMM GRANULOCYTES NFR BLD AUTO: 0.7 % (ref 0–0.5)
INR PPP: 1.3 (ref 0.8–1.2)
LACTATE SERPL-SCNC: 1.4 MMOL/L (ref 0.5–1.9)
LIPASE SERPL-CCNC: 72 U/L (ref 4–60)
LYMPHOCYTES # BLD AUTO: 1.2 K/UL (ref 1–4.8)
LYMPHOCYTES NFR BLD: 15.1 % (ref 18–48)
MCH RBC QN AUTO: 34.7 PG (ref 27–31)
MCHC RBC AUTO-ENTMCNC: 33.9 G/DL (ref 32–36)
MCV RBC AUTO: 102 FL (ref 82–98)
MONOCYTES # BLD AUTO: 0.5 K/UL (ref 0.3–1)
MONOCYTES NFR BLD: 6 % (ref 4–15)
NEUTROPHILS # BLD AUTO: 6.4 K/UL (ref 1.8–7.7)
NEUTROPHILS NFR BLD: 77.6 % (ref 38–73)
NRBC BLD-RTO: 0 /100 WBC
PLATELET # BLD AUTO: 86 K/UL (ref 150–450)
PMV BLD AUTO: 10.3 FL (ref 9.2–12.9)
POTASSIUM SERPL-SCNC: 3.5 MMOL/L (ref 3.5–5.1)
PROT SERPL-MCNC: 8.9 G/DL (ref 6–8.4)
PROTHROMBIN TIME: 13.5 SEC (ref 9–12.5)
RBC # BLD AUTO: 3.46 M/UL (ref 4.6–6.2)
SODIUM SERPL-SCNC: 137 MMOL/L (ref 136–145)
SPECIMEN OUTDATE: NORMAL
TROPONIN I SERPL HS-MCNC: 47.5 PG/ML (ref 0–14.9)
TROPONIN I SERPL HS-MCNC: 49.1 PG/ML (ref 0–14.9)
TROPONIN I SERPL HS-MCNC: 49.4 PG/ML (ref 0–14.9)
WBC # BLD AUTO: 8.23 K/UL (ref 3.9–12.7)

## 2023-04-28 PROCEDURE — C9113 INJ PANTOPRAZOLE SODIUM, VIA: HCPCS | Performed by: STUDENT IN AN ORGANIZED HEALTH CARE EDUCATION/TRAINING PROGRAM

## 2023-04-28 PROCEDURE — D9220A PRA ANESTHESIA: ICD-10-PCS | Mod: ANES,,, | Performed by: ANESTHESIOLOGY

## 2023-04-28 PROCEDURE — 63600175 PHARM REV CODE 636 W HCPCS: Performed by: STUDENT IN AN ORGANIZED HEALTH CARE EDUCATION/TRAINING PROGRAM

## 2023-04-28 PROCEDURE — 85730 THROMBOPLASTIN TIME PARTIAL: CPT | Performed by: STUDENT IN AN ORGANIZED HEALTH CARE EDUCATION/TRAINING PROGRAM

## 2023-04-28 PROCEDURE — 25000003 PHARM REV CODE 250: Performed by: STUDENT IN AN ORGANIZED HEALTH CARE EDUCATION/TRAINING PROGRAM

## 2023-04-28 PROCEDURE — 37000009 HC ANESTHESIA EA ADD 15 MINS: Performed by: INTERNAL MEDICINE

## 2023-04-28 PROCEDURE — 63600175 PHARM REV CODE 636 W HCPCS: Performed by: EMERGENCY MEDICINE

## 2023-04-28 PROCEDURE — 96366 THER/PROPH/DIAG IV INF ADDON: CPT

## 2023-04-28 PROCEDURE — 86900 BLOOD TYPING SEROLOGIC ABO: CPT | Performed by: STUDENT IN AN ORGANIZED HEALTH CARE EDUCATION/TRAINING PROGRAM

## 2023-04-28 PROCEDURE — 83605 ASSAY OF LACTIC ACID: CPT | Performed by: STUDENT IN AN ORGANIZED HEALTH CARE EDUCATION/TRAINING PROGRAM

## 2023-04-28 PROCEDURE — 96365 THER/PROPH/DIAG IV INF INIT: CPT

## 2023-04-28 PROCEDURE — 25000003 PHARM REV CODE 250: Performed by: INTERNAL MEDICINE

## 2023-04-28 PROCEDURE — 96367 TX/PROPH/DG ADDL SEQ IV INF: CPT

## 2023-04-28 PROCEDURE — 37000008 HC ANESTHESIA 1ST 15 MINUTES: Performed by: INTERNAL MEDICINE

## 2023-04-28 PROCEDURE — 85610 PROTHROMBIN TIME: CPT | Performed by: STUDENT IN AN ORGANIZED HEALTH CARE EDUCATION/TRAINING PROGRAM

## 2023-04-28 PROCEDURE — C9113 INJ PANTOPRAZOLE SODIUM, VIA: HCPCS | Performed by: EMERGENCY MEDICINE

## 2023-04-28 PROCEDURE — 43235 EGD DIAGNOSTIC BRUSH WASH: CPT | Performed by: INTERNAL MEDICINE

## 2023-04-28 PROCEDURE — 21400001 HC TELEMETRY ROOM

## 2023-04-28 PROCEDURE — 96375 TX/PRO/DX INJ NEW DRUG ADDON: CPT

## 2023-04-28 PROCEDURE — 63600175 PHARM REV CODE 636 W HCPCS: Mod: JA | Performed by: EMERGENCY MEDICINE

## 2023-04-28 PROCEDURE — D9220A PRA ANESTHESIA: Mod: CRNA,,, | Performed by: NURSE ANESTHETIST, CERTIFIED REGISTERED

## 2023-04-28 PROCEDURE — 93010 ELECTROCARDIOGRAM REPORT: CPT | Mod: ,,, | Performed by: INTERNAL MEDICINE

## 2023-04-28 PROCEDURE — 96376 TX/PRO/DX INJ SAME DRUG ADON: CPT

## 2023-04-28 PROCEDURE — 85018 HEMOGLOBIN: CPT | Mod: 91 | Performed by: STUDENT IN AN ORGANIZED HEALTH CARE EDUCATION/TRAINING PROGRAM

## 2023-04-28 PROCEDURE — 83880 ASSAY OF NATRIURETIC PEPTIDE: CPT | Performed by: STUDENT IN AN ORGANIZED HEALTH CARE EDUCATION/TRAINING PROGRAM

## 2023-04-28 PROCEDURE — 84484 ASSAY OF TROPONIN QUANT: CPT | Performed by: STUDENT IN AN ORGANIZED HEALTH CARE EDUCATION/TRAINING PROGRAM

## 2023-04-28 PROCEDURE — 84484 ASSAY OF TROPONIN QUANT: CPT | Mod: 91 | Performed by: STUDENT IN AN ORGANIZED HEALTH CARE EDUCATION/TRAINING PROGRAM

## 2023-04-28 PROCEDURE — 85025 COMPLETE CBC W/AUTO DIFF WBC: CPT | Performed by: STUDENT IN AN ORGANIZED HEALTH CARE EDUCATION/TRAINING PROGRAM

## 2023-04-28 PROCEDURE — 96361 HYDRATE IV INFUSION ADD-ON: CPT

## 2023-04-28 PROCEDURE — D9220A PRA ANESTHESIA: Mod: ANES,,, | Performed by: ANESTHESIOLOGY

## 2023-04-28 PROCEDURE — 99285 EMERGENCY DEPT VISIT HI MDM: CPT | Mod: 25

## 2023-04-28 PROCEDURE — 83690 ASSAY OF LIPASE: CPT | Performed by: STUDENT IN AN ORGANIZED HEALTH CARE EDUCATION/TRAINING PROGRAM

## 2023-04-28 PROCEDURE — 63600175 PHARM REV CODE 636 W HCPCS: Performed by: NURSE ANESTHETIST, CERTIFIED REGISTERED

## 2023-04-28 PROCEDURE — 93010 EKG 12-LEAD: ICD-10-PCS | Mod: ,,, | Performed by: INTERNAL MEDICINE

## 2023-04-28 PROCEDURE — 93005 ELECTROCARDIOGRAM TRACING: CPT | Performed by: INTERNAL MEDICINE

## 2023-04-28 PROCEDURE — 25000003 PHARM REV CODE 250: Performed by: EMERGENCY MEDICINE

## 2023-04-28 PROCEDURE — D9220A PRA ANESTHESIA: ICD-10-PCS | Mod: CRNA,,, | Performed by: NURSE ANESTHETIST, CERTIFIED REGISTERED

## 2023-04-28 PROCEDURE — 80053 COMPREHEN METABOLIC PANEL: CPT | Performed by: STUDENT IN AN ORGANIZED HEALTH CARE EDUCATION/TRAINING PROGRAM

## 2023-04-28 PROCEDURE — 25000003 PHARM REV CODE 250: Performed by: NURSE ANESTHETIST, CERTIFIED REGISTERED

## 2023-04-28 RX ORDER — NORTRIPTYLINE HYDROCHLORIDE 25 MG/1
50 CAPSULE ORAL NIGHTLY PRN
Status: DISCONTINUED | OUTPATIENT
Start: 2023-04-28 | End: 2023-04-29 | Stop reason: HOSPADM

## 2023-04-28 RX ORDER — DEXAMETHASONE SODIUM PHOSPHATE 4 MG/ML
INJECTION, SOLUTION INTRA-ARTICULAR; INTRALESIONAL; INTRAMUSCULAR; INTRAVENOUS; SOFT TISSUE
Status: DISCONTINUED | OUTPATIENT
Start: 2023-04-28 | End: 2023-04-28

## 2023-04-28 RX ORDER — HYDROMORPHONE HYDROCHLORIDE 1 MG/ML
0.5 INJECTION, SOLUTION INTRAMUSCULAR; INTRAVENOUS; SUBCUTANEOUS EVERY 4 HOURS PRN
Status: DISCONTINUED | OUTPATIENT
Start: 2023-04-28 | End: 2023-04-29 | Stop reason: HOSPADM

## 2023-04-28 RX ORDER — FENTANYL CITRATE 50 UG/ML
25 INJECTION, SOLUTION INTRAMUSCULAR; INTRAVENOUS EVERY 5 MIN PRN
Status: DISCONTINUED | OUTPATIENT
Start: 2023-04-28 | End: 2023-04-28 | Stop reason: HOSPADM

## 2023-04-28 RX ORDER — LORAZEPAM 2 MG/ML
1 INJECTION INTRAMUSCULAR
Status: COMPLETED | OUTPATIENT
Start: 2023-04-28 | End: 2023-04-28

## 2023-04-28 RX ORDER — ONDANSETRON 2 MG/ML
4 INJECTION INTRAMUSCULAR; INTRAVENOUS DAILY PRN
Status: DISCONTINUED | OUTPATIENT
Start: 2023-04-28 | End: 2023-04-28 | Stop reason: HOSPADM

## 2023-04-28 RX ORDER — OCTREOTIDE ACETATE 100 UG/ML
50 INJECTION, SOLUTION INTRAVENOUS; SUBCUTANEOUS
Status: DISCONTINUED | OUTPATIENT
Start: 2023-04-28 | End: 2023-04-28

## 2023-04-28 RX ORDER — LIDOCAINE HYDROCHLORIDE 20 MG/ML
INJECTION, SOLUTION EPIDURAL; INFILTRATION; INTRACAUDAL; PERINEURAL
Status: DISCONTINUED | OUTPATIENT
Start: 2023-04-28 | End: 2023-04-28

## 2023-04-28 RX ORDER — PROPOFOL 10 MG/ML
VIAL (ML) INTRAVENOUS
Status: DISCONTINUED | OUTPATIENT
Start: 2023-04-28 | End: 2023-04-28

## 2023-04-28 RX ORDER — THIAMINE HCL 100 MG
100 TABLET ORAL DAILY
Status: DISCONTINUED | OUTPATIENT
Start: 2023-04-28 | End: 2023-04-29 | Stop reason: HOSPADM

## 2023-04-28 RX ORDER — FAMOTIDINE 10 MG/ML
INJECTION INTRAVENOUS
Status: DISCONTINUED | OUTPATIENT
Start: 2023-04-28 | End: 2023-04-28

## 2023-04-28 RX ORDER — ONDANSETRON 2 MG/ML
8 INJECTION INTRAMUSCULAR; INTRAVENOUS
Status: COMPLETED | OUTPATIENT
Start: 2023-04-28 | End: 2023-04-28

## 2023-04-28 RX ORDER — DIPHENHYDRAMINE HYDROCHLORIDE 50 MG/ML
12.5 INJECTION INTRAMUSCULAR; INTRAVENOUS
Status: DISCONTINUED | OUTPATIENT
Start: 2023-04-28 | End: 2023-04-28 | Stop reason: HOSPADM

## 2023-04-28 RX ORDER — LORAZEPAM 2 MG/ML
1 INJECTION INTRAMUSCULAR
Status: DISCONTINUED | OUTPATIENT
Start: 2023-04-28 | End: 2023-04-29 | Stop reason: HOSPADM

## 2023-04-28 RX ORDER — CARVEDILOL 12.5 MG/1
12.5 TABLET ORAL 2 TIMES DAILY
Status: DISCONTINUED | OUTPATIENT
Start: 2023-04-28 | End: 2023-04-29 | Stop reason: HOSPADM

## 2023-04-28 RX ORDER — ONDANSETRON 2 MG/ML
INJECTION INTRAMUSCULAR; INTRAVENOUS
Status: DISCONTINUED | OUTPATIENT
Start: 2023-04-28 | End: 2023-04-28

## 2023-04-28 RX ORDER — TALC
6 POWDER (GRAM) TOPICAL NIGHTLY PRN
Status: DISCONTINUED | OUTPATIENT
Start: 2023-04-28 | End: 2023-04-29 | Stop reason: HOSPADM

## 2023-04-28 RX ORDER — LACTULOSE 10 G/15ML
20 SOLUTION ORAL 3 TIMES DAILY
Status: DISCONTINUED | OUTPATIENT
Start: 2023-04-29 | End: 2023-04-29 | Stop reason: HOSPADM

## 2023-04-28 RX ORDER — DIPHENHYDRAMINE HYDROCHLORIDE 50 MG/ML
INJECTION INTRAMUSCULAR; INTRAVENOUS
Status: DISCONTINUED | OUTPATIENT
Start: 2023-04-28 | End: 2023-04-28

## 2023-04-28 RX ORDER — SUCCINYLCHOLINE CHLORIDE 20 MG/ML
INJECTION INTRAMUSCULAR; INTRAVENOUS
Status: DISCONTINUED | OUTPATIENT
Start: 2023-04-28 | End: 2023-04-28

## 2023-04-28 RX ORDER — PANTOPRAZOLE SODIUM 40 MG/10ML
80 INJECTION, POWDER, LYOPHILIZED, FOR SOLUTION INTRAVENOUS
Status: COMPLETED | OUTPATIENT
Start: 2023-04-28 | End: 2023-04-28

## 2023-04-28 RX ORDER — AMLODIPINE BESYLATE 5 MG/1
10 TABLET ORAL DAILY
Status: DISCONTINUED | OUTPATIENT
Start: 2023-04-28 | End: 2023-04-29 | Stop reason: HOSPADM

## 2023-04-28 RX ORDER — ONDANSETRON 2 MG/ML
4 INJECTION INTRAMUSCULAR; INTRAVENOUS EVERY 8 HOURS PRN
Status: DISCONTINUED | OUTPATIENT
Start: 2023-04-28 | End: 2023-04-29 | Stop reason: HOSPADM

## 2023-04-28 RX ORDER — HYDROMORPHONE HYDROCHLORIDE 1 MG/ML
0.5 INJECTION, SOLUTION INTRAMUSCULAR; INTRAVENOUS; SUBCUTANEOUS
Status: COMPLETED | OUTPATIENT
Start: 2023-04-28 | End: 2023-04-28

## 2023-04-28 RX ADMIN — PROPOFOL 200 MG: 10 INJECTION, EMULSION INTRAVENOUS at 01:04

## 2023-04-28 RX ADMIN — LORAZEPAM 1 MG: 2 INJECTION INTRAMUSCULAR; INTRAVENOUS at 06:04

## 2023-04-28 RX ADMIN — SODIUM CHLORIDE 8 MG/HR: 9 INJECTION, SOLUTION INTRAVENOUS at 11:04

## 2023-04-28 RX ADMIN — PANTOPRAZOLE SODIUM 80 MG: 40 INJECTION, POWDER, LYOPHILIZED, FOR SOLUTION INTRAVENOUS at 05:04

## 2023-04-28 RX ADMIN — FAMOTIDINE 20 MG: 10 INJECTION, SOLUTION INTRAVENOUS at 01:04

## 2023-04-28 RX ADMIN — SODIUM CHLORIDE, SODIUM LACTATE, POTASSIUM CHLORIDE, AND CALCIUM CHLORIDE: .6; .31; .03; .02 INJECTION, SOLUTION INTRAVENOUS at 01:04

## 2023-04-28 RX ADMIN — HYDROMORPHONE HYDROCHLORIDE 0.5 MG: 0.5 INJECTION, SOLUTION INTRAMUSCULAR; INTRAVENOUS; SUBCUTANEOUS at 07:04

## 2023-04-28 RX ADMIN — PROPOFOL 100 MG: 10 INJECTION, EMULSION INTRAVENOUS at 01:04

## 2023-04-28 RX ADMIN — DIPHENHYDRAMINE HYDROCHLORIDE 6.25 MG: 50 INJECTION INTRAMUSCULAR; INTRAVENOUS at 01:04

## 2023-04-28 RX ADMIN — SODIUM CHLORIDE 8 MG/HR: 9 INJECTION, SOLUTION INTRAVENOUS at 08:04

## 2023-04-28 RX ADMIN — OCTREOTIDE ACETATE 50 MCG/HR: 500 INJECTION, SOLUTION INTRAVENOUS; SUBCUTANEOUS at 08:04

## 2023-04-28 RX ADMIN — Medication 180 MG: at 01:04

## 2023-04-28 RX ADMIN — ONDANSETRON 4 MG: 2 INJECTION INTRAMUSCULAR; INTRAVENOUS at 01:04

## 2023-04-28 RX ADMIN — CEFTRIAXONE SODIUM 1 G: 1 INJECTION, POWDER, FOR SOLUTION INTRAMUSCULAR; INTRAVENOUS at 07:04

## 2023-04-28 RX ADMIN — SODIUM CHLORIDE 1000 ML: 0.9 INJECTION, SOLUTION INTRAVENOUS at 05:04

## 2023-04-28 RX ADMIN — LIDOCAINE HYDROCHLORIDE 100 MG: 20 INJECTION, SOLUTION EPIDURAL; INFILTRATION; INTRACAUDAL; PERINEURAL at 01:04

## 2023-04-28 RX ADMIN — DEXAMETHASONE SODIUM PHOSPHATE 8 MG: 4 INJECTION, SOLUTION INTRAMUSCULAR; INTRAVENOUS at 01:04

## 2023-04-28 RX ADMIN — CARVEDILOL 12.5 MG: 12.5 TABLET, FILM COATED ORAL at 11:04

## 2023-04-28 RX ADMIN — ONDANSETRON 8 MG: 2 INJECTION INTRAMUSCULAR; INTRAVENOUS at 06:04

## 2023-04-28 RX ADMIN — HYDROMORPHONE HYDROCHLORIDE 0.5 MG: 1 INJECTION, SOLUTION INTRAMUSCULAR; INTRAVENOUS; SUBCUTANEOUS at 09:04

## 2023-04-28 NOTE — CONSULTS
GASTROENTEROLOGY INPATIENT CONSULT NOTE  Patient Name: Irvin Diaz Jr.  Patient MRN: [id number]  Patient : 1975    Admit Date: 2023  Service date: 2023    Reason for Consult:  Hematemesis    PCP: Edouard Gibson MD    Chief Complaint   Patient presents with    Abdominal Pain    Vomiting     Dark red, coffee ground, since 1am       HPI: Patient is a 47 y.o. male with PMHx  with history of cirrhosis of the liver and recurrent variceal bleeding who is had multiple episodes of esophageal banding.  The last episode where he underwent endoscopy demonstrated only scarring and no active bleeding.  He did have significant portal gastropathy at that time.  He is stopped drinking sips September of last year until the last 2 weeks began drinking again which is likely what precipitated his new onset of hematemesis..     Past Medical History:  Past Medical History:   Diagnosis Date    Alcohol withdrawal 2022    Alcoholic cirrhosis of liver     Alcoholic ketoacidosis 2021    Arthritis     ATN (acute tubular necrosis)     CHF (congestive heart failure)     Diverticulitis 2020    Esophageal varices     GERD (gastroesophageal reflux disease)     GI bleed     Hip arthritis     Left    Hypertension     Liver cirrhosis     Macrocytic anemia     Pulmonary embolism 2018    Unprovoked DVT.  Stop Coumadin due to GIB    Thrombocytopenia         Past Surgical History:  Past Surgical History:   Procedure Laterality Date    ANKLE SURGERY      BLOCK, NERVE, PERIPHERAL Left 2022    Procedure: Left Hip femoral-obturator accessory nerve block;  Surgeon: Robbin De La Garza DO;  Location: [facility] OR;  Service: Pain Management;  Laterality: Left;    COLON SURGERY      COLONOSCOPY  2019    [facility]    COLONOSCOPY N/A 2021    Procedure: COLONOSCOPY;  Surgeon: Renea Billingsley MD;  Location: [facility];  Service: Endoscopy;  Laterality: N/A;    COLONOSCOPY N/A 2023    Procedure: COLONOSCOPY;   Surgeon: Rudy Orellana MD;  Location: [facility] (4TH FLR);  Service: Endoscopy;  Laterality: N/A;  cirrhosis-labs done on 1/11/23  instr portal-GT  No answer for precall- [place]    COLONOSCOPY N/A 3/10/2023    Procedure: COLONOSCOPY;  Surgeon: Rudy Orellana MD;  Location: [facility] (4TH FLR);  Service: Endoscopy;  Laterality: N/A;  inst via poral per pt request    COLONOSCOPY W/ BIOPSIES  02/17/2021    ESOPHAGOGASTRODUODENOSCOPY N/A 07/26/2019    Procedure: EGD (ESOPHAGOGASTRODUODENOSCOPY);  Surgeon: Brian Trivedi MD;  Location: [facility];  Service: Endoscopy;  Laterality: N/A;    ESOPHAGOGASTRODUODENOSCOPY N/A 04/08/2020    Procedure: EGD (ESOPHAGOGASTRODUODENOSCOPY);  Surgeon: Donn Acuna MD;  Location: [facility];  Service: Endoscopy;  Laterality: N/A;    ESOPHAGOGASTRODUODENOSCOPY N/A 01/03/2021    Procedure: EGD (ESOPHAGOGASTRODUODENOSCOPY)- coffee ground emesis, hx varices;  Surgeon: Steve Chairez MD;  Location: [facility];  Service: Endoscopy;  Laterality: N/A;    ESOPHAGOGASTRODUODENOSCOPY N/A 05/03/2021    Procedure: EGD (ESOPHAGOGASTRODUODENOSCOPY);  Surgeon: Jeanmarie Ngo MD;  Location: [facility];  Service: Endoscopy;  Laterality: N/A;    ESOPHAGOGASTRODUODENOSCOPY N/A 07/19/2021    Procedure: ESOPHAGOGASTRODUODENOSCOPY (EGD);  Surgeon: Claudio Medeiros MD;  Location: [facility];  Service: Endoscopy;  Laterality: N/A;    ESOPHAGOGASTRODUODENOSCOPY  12/20/2021    ESOPHAGOGASTRODUODENOSCOPY N/A 12/20/2021    Procedure: EGD (ESOPHAGOGASTRODUODENOSCOPY);  Surgeon: Ajay Jackson MD;  Location: [facility];  Service: Endoscopy;  Laterality: N/A;    ESOPHAGOGASTRODUODENOSCOPY N/A 05/03/2022    Procedure: EGD (ESOPHAGOGASTRODUODENOSCOPY);  Surgeon: Brian Trivedi MD;  Location: [facility];  Service: Endoscopy;  Laterality: N/A;    ESOPHAGOGASTRODUODENOSCOPY N/A 7/7/2022    Procedure: EGD (ESOPHAGOGASTRODUODENOSCOPY);  Surgeon: Ajay Jackson MD;  Location: [facility];  Service: Endoscopy;   Laterality: N/A;    ESOPHAGOGASTRODUODENOSCOPY N/A 11/29/2022    Procedure: EGD (ESOPHAGOGASTRODUODENOSCOPY);  Surgeon: Edouard Maynard MD;  Location: [facility] ([address] [place] [address]);  Service: Endoscopy;  Laterality: N/A;    HERNIA REPAIR Left     Inguinal    UPPER GASTROINTESTINAL ENDOSCOPY N/A 07/07/2022        Home Medications:  Medications Prior to Admission   Medication Sig Dispense Refill Last Dose    acamprosate (CAMPRAL) 333 mg tablet Take 2 tablets (666 mg total) by mouth 3 (three) times daily. 180 tablet 11 Past Week    amLODIPine (NORVASC) 10 MG tablet Take 1 tablet (10 mg total) by mouth once daily. 30 tablet 11 Past Week    augmented betamethasone dipropionate (DIPROLENE-AF) 0.05 % cream Apply topically 2 (two) times daily. (Patient taking differently: Apply 1 application topically 2 (two) times daily.) 50 g 1 Past Week    calcitRIOL (ROCALTROL) 0.25 MCG Cap Take 1 capsule (0.25 mcg total) by mouth once daily. 30 capsule 1 Past Week    carvediloL (COREG) 12.5 MG tablet Take 1 tablet (12.5 mg total) by mouth 2 (two) times daily with meals. 60 tablet 11 Past Week    clindamycin-benzoyl peroxide (BENZACLIN) gel Apply topically 2 (two) times daily. (Patient taking differently: Apply 1 application topically 2 (two) times daily.) 50 g 1 Past Week    ferrous gluconate (FERGON) 240 (27 FE) MG tablet Take 2 tablets (480 mg total) by mouth 2 (two) times daily with meals. 120 tablet 3 Past Week    folic acid (FOLVITE) 1 MG tablet Take 1 tablet (1 mg total) by mouth once daily. 30 tablet 5 Past Week    furosemide (LASIX) 20 MG tablet Take 1 tablet (20 mg total) by mouth once daily. 30 tablet 11 Past Week    nortriptyline (PAMELOR) 50 MG capsule Take 1 capsule (50 mg total) by mouth nightly as needed (pain and insomnia). 90 capsule 1 Past Week    pantoprazole (PROTONIX) 40 MG tablet Take 1 tablet (40 mg total) by mouth 2 (two) times daily. 60 tablet 11 Past Week    rifAXIMin (XIFAXAN) 550 mg Tab Take 1 tablet (550 mg total)  by mouth 2 (two) times daily. 60 tablet 11 Past Week    sildenafiL (VIAGRA) 100 MG tablet Take 1 tablet (100 mg total) by mouth daily as needed for Erectile Dysfunction. 30 tablet 5 Past Month    spironolactone (ALDACTONE) 50 MG tablet Take 1 tablet (50 mg total) by mouth once daily. 30 tablet 11 Past Week    thiamine 100 MG tablet Take 1 tablet (100 mg total) by mouth once daily. 30 tablet 5 Past Week    traMADoL (ULTRAM) 50 mg tablet Take 1 tablet (50 mg total) by mouth 3 (three) times daily as needed for Pain. 90 tablet 0 Past Week    lactulose (CHRONULAC) 10 gram/15 mL solution Take 30 mLs (20 g total) by mouth 3 (three) times daily. Take enough to have 2 to 3 bowel movements a day to prevent hepatic encephalopathy. (Patient not taking: Reported on 2023) 2700 mL 11 Not Taking       Inpatient Medications:   [START ON 2023] lactulose  20 g Oral TID    [START ON 2023] rifAXIMin  550 mg Oral BID    thiamine  100 mg Oral Daily     melatonin, nortriptyline, ondansetron    Review of patient's allergies indicates:   Allergen Reactions    Ciprofloxacin hcl Hallucinations    Meperidine Hives     Pt medicated w/multiple medications (demerol, protonix, and zofran)  w/i 10 mins time frame prior to developing localized hives near IV site that med was administered. Pt reports he has/takes all other medications on daily basis.     Morphine Itching    Nsaids (non-steroidal anti-inflammatory drug)      Kidney Disease    Tylenol [acetaminophen]      Hx of liver disease       Social History:   Social History     Occupational History    Not on file   Tobacco Use    Smoking status: Former     Types: Cigarettes     Quit date:      Years since quittin.3    Smokeless tobacco: Never    Tobacco comments:     quit 20 years ago   Substance and Sexual Activity    Alcohol use: Not Currently     Comment: Quit drinking 21    Drug use: Not Currently    Sexual activity: Yes     Comment: occ       Family History:    Family History   Problem Relation Age of Onset    Hypertension Mother     Breast cancer Mother     Hypertension Father     Prostate cancer Father     Bladder Cancer Father        Review of Systems:  A 10 point review of systems was performed and was normal, except as mentioned in the HPI, including constitutional, HEENT, heme, lymph, cardiovascular, respiratory, gastrointestinal, genitourinary, neurologic, endocrine, psychiatric and musculoskeletal.      OBJECTIVE:    Physical Exam:  24 Hour Vital Sign Ranges: Temp:  [98.1 °F (36.7 °C)-98.4 °F (36.9 °C)] 98.1 °F (36.7 °C)  Pulse:  [100-116] 106  Resp:  [13-22] 15  SpO2:  [94 %-99 %] 98 %  BP: (165-212)/() 165/95  Most recent vitals: BP (!) 165/95   Pulse 106   Temp 98.1 °F (36.7 °C) (Oral)   Resp 15   Ht 6' (1.829 m)   Wt 90.7 kg (200 lb)   SpO2 98% Comment: room air  BMI 27.12 kg/m²    GEN: well-developed, well-nourished, awake and alert, non-toxic appearing adult  HEENT: PERRL, sclera anicteric, oral mucosa pink and moist without lesion  NECK: trachea midline; Good ROM  CV: regular rate and rhythm, no murmurs or gallops  RESP: clear to auscultation bilaterally, no wheezes, rhonci or rales  ABD: soft, non-tender, non-distended, normal bowel sounds  EXT: no swelling or edema, 2+ pulses distally  SKIN: no rashes or jaundice  PSYCH: normal affect    Labs:   Recent Labs     04/28/23  0557   WBC 8.23   *   PLT 86*     Recent Labs     04/28/23  0557      K 3.5      CO2 22*   BUN 19        No results for input(s): ALB in the last 72 hours.    Invalid input(s): ALKP, SGOT, SGPT, TBIL, DBIL, TPRO  Recent Labs     04/28/23  0557   INR 1.3*         Radiology Review:  X-Ray Chest 1 View   Final Result            IMPRESSION / RECOMMENDATIONS:  Cirrhosis of the liver with jaundice pancytopenia.  Mild elevation in liver enzymes.  Acute onset of hematemesis this morning.  Known esophageal varices.  Plan is Sandostatin drip followed by  emergent upper endoscopy which will arrange.  He has been given Protonix in the emergency room.  Denies any nonsteroidals.  Endoscopy will be done in the OR after intubation to protect his airway.  In case of bleeding actively.    Thank you for this consult.    Caesar Dickens  4/28/2023  1:27 PM

## 2023-04-28 NOTE — PROGRESS NOTES
04/28/23 1515   Handoff Report   Given To OTIS Mendosa       Nursing Transfer Note      4/28/2023     Reason patient is being transferred: Return to ER post procedure and recovery     Transfer To: ER Room #19    Transfer via stretcher    Transfer with na    Transported by OTIS Dunne     Medicines sent: Protonix not infusing     Any special needs or follow-up needed: may eat    Chart send with patient: No chart left with patient    Notified: No one here but wife had called earlier to check on patient    Upon arrival to floor: cardiac monitor applied, patient oriented to room, call bell in reach, and bed in lowest position

## 2023-04-28 NOTE — ED NOTES
Bed: 19  Expected date:   Expected time:   Means of arrival:   Comments:  Endo pt returning to unit

## 2023-04-28 NOTE — PHARMACY MED REC
"  Admission Medication History     The home medication history was taken by Shayy Ashton.    You may go to "Admission" then "Reconcile Home Medications" tabs to review and/or act upon these items.     The home medication list has been updated by the Pharmacy department.   Please read ALL comments highlighted in yellow.   Please address this information as you see fit.    Feel free to contact us if you have any questions or require assistance.          Medications listed below were obtained from: Patient/family  Prior to Admission medications    Medication Sig Start Date End Date Taking? Authorizing Provider   acamprosate (CAMPRAL) 333 mg tablet Take 2 tablets (666 mg total) by mouth 3 (three) times daily. 9/26/22 9/26/23 Yes Arthur Otto MD   amLODIPine (NORVASC) 10 MG tablet Take 1 tablet (10 mg total) by mouth once daily. 12/22/22 12/22/23 Yes Ambreen Hou MD   augmented betamethasone dipropionate (DIPROLENE-AF) 0.05 % cream Apply topically 2 (two) times daily.  Patient taking differently: Apply 1 application topically 2 (two) times daily. 1/23/23  Yes Hossein De La O MD   calcitRIOL (ROCALTROL) 0.25 MCG Cap Take 1 capsule (0.25 mcg total) by mouth once daily. 12/22/22  Yes Ambreen Hou MD   carvediloL (COREG) 12.5 MG tablet Take 1 tablet (12.5 mg total) by mouth 2 (two) times daily with meals. 8/18/22 8/18/23 Yes Ambreen Hou MD   clindamycin-benzoyl peroxide (BENZACLIN) gel Apply topically 2 (two) times daily.  Patient taking differently: Apply 1 application topically 2 (two) times daily. 1/23/23 1/23/24 Yes Edouard Gibson MD   ferrous gluconate (FERGON) 240 (27 FE) MG tablet Take 2 tablets (480 mg total) by mouth 2 (two) times daily with meals. 10/30/22  Yes Floridalma Veronica MD   folic acid (FOLVITE) 1 MG tablet Take 1 tablet (1 mg total) by mouth once daily. 12/14/22  Yes Edouard Gibson MD   furosemide (LASIX) 20 MG tablet Take 1 tablet (20 mg total) by mouth once daily. 12/1/22 12/1/23 Yes Keesha FARLEY " MD Veronica   nortriptyline (PAMELOR) 50 MG capsule Take 1 capsule (50 mg total) by mouth nightly as needed (pain and insomnia). 1/25/23 7/24/23 Yes Robbin De La Garza DO   pantoprazole (PROTONIX) 40 MG tablet Take 1 tablet (40 mg total) by mouth 2 (two) times daily. 11/4/22 10/30/23 Yes Chanel Hernández MD   rifAXIMin (XIFAXAN) 550 mg Tab Take 1 tablet (550 mg total) by mouth 2 (two) times daily. 9/30/22  Yes Floridalma Veronica MD   sildenafiL (VIAGRA) 100 MG tablet Take 1 tablet (100 mg total) by mouth daily as needed for Erectile Dysfunction. 10/28/22 10/28/23 Yes Edouard Gibson MD   spironolactone (ALDACTONE) 50 MG tablet Take 1 tablet (50 mg total) by mouth once daily. 12/1/22 12/1/23 Yes Keesha Martin MD   thiamine 100 MG tablet Take 1 tablet (100 mg total) by mouth once daily. 6/30/22  Yes Edouard Gibson MD   traMADoL (ULTRAM) 50 mg tablet Take 1 tablet (50 mg total) by mouth 3 (three) times daily as needed for Pain. 3/19/23 5/3/23 Yes Robbin De La Garza,    lactulose (CHRONULAC) 10 gram/15 mL solution Take 30 mLs (20 g total) by mouth 3 (three) times daily. Take enough to have 2 to 3 bowel movements a day to prevent hepatic encephalopathy.  Patient not taking: Reported on 4/28/2023 1/12/23 1/12/24  Abiel Owusu MD       Potential issues to be addressed PRIOR TO DISCHARGE  Patient states he takes Ferrous Gluconate 1 tab EOD instead of  the prescribed dose 2 tab BID.  Patient also states he does not take Lactulose.    Shayy Ashton  EXT 1924               .

## 2023-04-28 NOTE — PLAN OF CARE
"Critical access hospital - Emergency Dept  Initial Discharge Assessment       Primary Care Provider: Edouard Gibson MD    Admission Diagnosis: GI bleed [K92.2]    Admission Date: 4/28/2023  Expected Discharge Date: 5/1/2023    Assessment completed at bedside with patient in ED19. Patient lives with spouse, completes his ADL's independently without any DME, no HH at time of admission, drives himself. No needs anticipated at discharge, case management to continue to follow.    Discharge Barriers Identified: None    Payor: MEDICARE / Plan: MEDICARE PART A & B / Product Type: Government /     Extended Emergency Contact Information  Primary Emergency Contact: Aj Diaz  Mobile Phone: [phone]  Relation: Spouse  Secondary Emergency Contact: Irvin Diaz   [place]  Mobile Phone: [phone]  Relation: Father  Preferred language: English   needed? No  Mother: Sharon Diaz  Address: [address] [place] [address]  Mobile Phone: [phone]    Discharge Plan A: Home with family  Discharge Plan B: Home with family      [facility] [address] [place] [address]  Phone: [phone] Fax: [phone]      Initial Assessment (most recent)       Adult Discharge Assessment - 04/28/23 1211          Discharge Assessment    Assessment Type Discharge Planning Assessment     Confirmed/corrected address, phone number and insurance Yes     Confirmed Demographics Correct on Facesheet     Source of Information patient     When was your last doctors appointment? --   "3 months ago"    Communicated RADHA with patient/caregiver Date not available/Unable to determine     Reason For Admission Gastrointestinal hemorrhage with hematemesis     People in Home spouse     Facility Arrived From: home     Do you expect to return to your current living situation? Yes     Do you have help at home or someone to help you manage your " care at home? Yes     Who are your caregiver(s) and their phone number(s)? Spouse / Aj [phone]     Prior to hospitilization cognitive status: Alert/Oriented     Current cognitive status: Alert/Oriented     Equipment Currently Used at Home none     Readmission within 30 days? No     Patient currently being followed by outpatient case management? No     Do you currently have service(s) that help you manage your care at home? No     Do you take prescription medications? Yes     Do you have prescription coverage? Yes     Coverage Payor:  MEDICARE - MEDICARE PART A & B     Do you have any problems affording any of your prescribed medications? No     Is the patient taking medications as prescribed? yes     Who is going to help you get home at discharge? Spouse / Aj [phone]     How do you get to doctors appointments? car, drives self     Are you on dialysis? No     Do you take coumadin? No     Discharge Plan A Home with family     Discharge Plan B Home with family     DME Needed Upon Discharge  none     Discharge Plan discussed with: Patient     Discharge Barriers Identified None

## 2023-04-28 NOTE — ANESTHESIA PROCEDURE NOTES
Intubation    Date/Time: 4/28/2023 1:43 PM  Performed by: Rebel Tanner CRNA  Authorized by: Tad Ortiz MD     Intubation:     Induction:  Intravenous    Intubated:  Postinduction    Mask Ventilation:  Easy with oral airway    Attempts:  1    Attempted By:  CRNA    Method of Intubation:  Direct    Blade:  Nancy 3    Laryngeal View Grade: Grade I - full view of cords      Difficult Airway Encountered?: No      Complications:  None    Airway Device:  Oral endotracheal tube    Airway Device Size:  7.0    Style/Cuff Inflation:  Cuffed    Inflation Amount (mL):  8    Tube secured:  22    Secured at:  The lips    Placement Verified By:  Capnometry    Complicating Factors:  None    Findings Post-Intubation:  BS equal bilateral and atraumatic/condition of teeth unchanged  Notes:      Smooth induction, atraumatic intubation, dentition/lips intact and unchanged,

## 2023-04-28 NOTE — BRIEF OP NOTE
Patient underwent endoscopy after intubation.  There was no active bleeding.  There was no blood in the stomach.  There was active esophagitis.  No significant varices seen.    Recommendation high-dose proton pump inhibitor.  May advance diet.  Serial H&Hs.  She will be observed in the hospital for 24 hours.

## 2023-04-28 NOTE — TRANSFER OF CARE
Anesthesia Transfer of Care Note    Patient: Irvin Diaz     Procedure(s) Performed: Procedure(s) (LRB):  EGD (ESOPHAGOGASTRODUODENOSCOPY) (N/A)    Patient location: PACU    Anesthesia Type: general    Transport from OR: Transported from OR on room air with adequate spontaneous ventilation    Post pain: adequate analgesia    Post assessment: no apparent anesthetic complications and tolerated procedure well    Post vital signs: stable    Level of consciousness: responds to stimulation and awake    Nausea/Vomiting: no nausea/vomiting    Complications: none    Transfer of care protocol was followed      Last vitals:   Visit Vitals  BP (!) 165/95   Pulse 106   Temp 36.7 °C (98.1 °F) (Oral)   Resp 15   Ht 6' (1.829 m)   Wt 90.7 kg (200 lb)   SpO2 98%   BMI 27.12 kg/m²

## 2023-04-28 NOTE — PROVATION PATIENT INSTRUCTIONS
Discharge Summary/Instructions after an Endoscopic Procedure  Patient Name: Irvin Macias  Patient MRN: [id number]  Patient YOB: 1975  Friday, April 28, 2023  Caesar Dickens MD  RESTRICTIONS:  During your procedure today, you received medications for sedation.  These   medications may affect your judgment, balance and coordination.  Therefore,   for 24 hours, you have the following restrictions:   - DO NOT drive a car, operate machinery, make legal/financial decisions,   sign important papers or drink alcohol.    ACTIVITY:  Today: no heavy lifting, straining or running due to procedural   sedation/anesthesia.  The following day: return to full activity including work.  DIET:  Eat and drink normally unless instructed otherwise.     TREATMENT FOR COMMON SIDE EFFECTS:  - Mild abdominal pain, nausea, belching, bloating or excessive gas:  rest,   eat lightly and use a heating pad.  - Sore Throat: treat with throat lozenges and/or gargle with warm salt   water.  - Because air was used during the procedure, expelling large amounts of air   from your rectum or belching is normal.  - If a bowel prep was taken, you may not have a bowel movement for 1-3 days.    This is normal.  SYMPTOMS TO WATCH FOR AND REPORT TO YOUR PHYSICIAN:  1. Abdominal pain or bloating, other than gas cramps.  2. Chest pain.  3. Back pain.  4. Signs of infection such as: chills or fever occurring within 24 hours   after the procedure.  5. Rectal bleeding, which would show as bright red, maroon, or black stools.   (A tablespoon of blood from the rectum is not serious, especially if   hemorrhoids are present.)  6. Vomiting.  7. Weakness or dizziness.  GO DIRECTLY TO THE NEAREST EMERGENCY ROOM IF YOU HAVE ANY OF THE FOLLOWING:      Difficulty breathing              Chills and/or fever over 101 F   Persistent vomiting and/or vomiting blood   Severe abdominal pain   Severe chest pain   Black, tarry stools   Bleeding- more than one  tablespoon   Any other symptom or condition that you feel may need urgent attention  Your doctor recommends these additional instructions:  If any biopsies were taken, your doctors clinic will contact you in 1 to 2   weeks with any results.  - Return patient to [facility] for [facility] care.   - Use Protonix (pantoprazole) 40 mg PO BID.  For questions, problems or results please call your physician - Caesar Dickens MD at Work:  [phone].  [facility], EMERGENCY ROOM PHONE NUMBER: [phone]  IF A COMPLICATION OR EMERGENCY SITUATION ARISES AND YOU ARE UNABLE TO REACH   YOUR PHYSICIAN - GO DIRECTLY TO THE EMERGENCY ROOM.  Caesar Dickens MD  4/28/2023 1:57:48 PM  This report has been verified and signed electronically.  Dear patient,  As a result of recent federal legislation (The Federal Cures Act), you may   receive lab or pathology results from your procedure in your [facility]   account before your physician is able to contact you. Your physician or   their representative will relay the results to you with their   recommendations at their soonest availability.  Thank you,  PROVATION

## 2023-04-28 NOTE — ANESTHESIA PREPROCEDURE EVALUATION
04/28/2023  Irvin Diaz Jr. is a 47 y.o., male.      Patient Active Problem List   Diagnosis    Coagulopathy    Thrombocytopenia    History of pulmonary embolus (PE)    AVN (avascular necrosis of bone)    Hydronephrosis of right kidney    Transaminitis    Elevated lipase    Essential hypertension    Alcoholic cirrhosis of liver    CKD (chronic kidney disease) stage 3, GFR 30-59 ml/min    Hip arthritis    Arm pain    History of GI bleed    Colitis    Alcohol abuse    Ascites due to alcoholic cirrhosis    Diastolic dysfunction    Jaundice    Hyperbilirubinemia    Dilation of common bile duct    Chronic left hip pain    Malnutrition    Alcohol use disorder, severe, dependence    Encounter for pre-transplant evaluation for liver transplant    Hepatic encephalopathy    Long-term use of high-risk medication    Melena    Secondary esophageal varices without bleeding    Anemia in chronic kidney disease (CKD)    Gastrointestinal hemorrhage with hematemesis       Past Surgical History:   Procedure Laterality Date    ANKLE SURGERY      BLOCK, NERVE, PERIPHERAL Left 06/24/2022    Procedure: Left Hip femoral-obturator accessory nerve block;  Surgeon: Robbin De La Garza DO;  Location: [facility];  Service: Pain Management;  Laterality: Left;    COLON SURGERY  2007    [facility]  09/05/2019    [facility]    COLONOSCOPY N/A 02/17/2021    Procedure: COLONOSCOPY;  Surgeon: Renea Billingsley MD;  Location: [facility];  Service: Endoscopy;  Laterality: N/A;    COLONOSCOPY N/A 2/13/2023    Procedure: COLONOSCOPY;  Surgeon: Rudy Orellana MD;  Location: [facility] ([address] [place] [address]);  Service: Endoscopy;  Laterality: N/A;  cirrhosis-labs done on 1/11/23  instr portal-GT  No answer for precall- [place]    COLONOSCOPY N/A 3/10/2023    Procedure: COLONOSCOPY;  Surgeon: Rudy Orellana MD;  [facility]   Service: Endoscopy;  Laterality: N/A;  inst via poral per pt request    COLONOSCOPY W/ BIOPSIES  02/17/2021    ESOPHAGOGASTRODUODENOSCOPY N/A 07/26/2019    Procedure: EGD (ESOPHAGOGASTRODUODENOSCOPY);  Surgeon: Brian Trivedi MD;  Location: [facility];  Service: Endoscopy;  Laterality: N/A;    ESOPHAGOGASTRODUODENOSCOPY N/A 04/08/2020    Procedure: EGD (ESOPHAGOGASTRODUODENOSCOPY);  Surgeon: Donn Acuna MD;  Location: [facility];  Service: Endoscopy;  Laterality: N/A;    ESOPHAGOGASTRODUODENOSCOPY N/A 01/03/2021    Procedure: EGD (ESOPHAGOGASTRODUODENOSCOPY)- coffee ground emesis, hx varices;  Surgeon: Steve Chairez MD;  Location: [facility];  Service: Endoscopy;  Laterality: N/A;    ESOPHAGOGASTRODUODENOSCOPY N/A 05/03/2021    Procedure: EGD (ESOPHAGOGASTRODUODENOSCOPY);  Surgeon: Jeanmarie Ngo MD;  [facility];  Service: Endoscopy;  Laterality: N/A;    ESOPHAGOGASTRODUODENOSCOPY N/A 07/19/2021    Procedure: ESOPHAGOGASTRODUODENOSCOPY (EGD);  Surgeon: Claudio Medeiros MD;  Location: [facility];  Service: Endoscopy;  Laterality: N/A;    ESOPHAGOGASTRODUODENOSCOPY  12/20/2021    ESOPHAGOGASTRODUODENOSCOPY N/A 12/20/2021    Procedure: EGD (ESOPHAGOGASTRODUODENOSCOPY);  Surgeon: Ajay Jackson MD;  Location: [facility];  Service: Endoscopy;  Laterality: N/A;    ESOPHAGOGASTRODUODENOSCOPY N/A 05/03/2022    Procedure: EGD (ESOPHAGOGASTRODUODENOSCOPY);  Surgeon: Brian Trivedi MD;  Location: [facility];  Service: Endoscopy;  Laterality: N/A;    ESOPHAGOGASTRODUODENOSCOPY N/A 7/7/2022    Procedure: EGD (ESOPHAGOGASTRODUODENOSCOPY);  Surgeon: Ajay Jackson MD;  Location: [facility];  Service: Endoscopy;  Laterality: N/A;    ESOPHAGOGASTRODUODENOSCOPY N/A 11/29/2022    Procedure: EGD (ESOPHAGOGASTRODUODENOSCOPY);  Surgeon: Edouard Maynard MD;  Location: [address]  Service: Endoscopy;  Laterality: N/A;    HERNIA REPAIR Left     Inguinal    UPPER GASTROINTESTINAL  ENDOSCOPY N/A 07/07/2022        Tobacco Use:  The patient  reports that he quit smoking about 23 years ago. His smoking use included cigarettes. He has never used smokeless tobacco.     Results for orders placed or performed during the hospital encounter of 04/28/23   EKG 12-lead    Collection Time: 04/28/23  5:50 AM    Narrative    Test Reason : K92.2,    Vent. Rate : 122 BPM     Atrial Rate : 122 BPM     P-R Int : 146 ms          QRS Dur : 090 ms      QT Int : 324 ms       P-R-T Axes : 051 084 -02 degrees     QTc Int : 461 ms    Sinus tachycardia  T wave abnormality, consider inferior ischemia  Abnormal ECG  When compared with ECG of 25-NOV-2022 19:51,  T wave amplitude has increased in Anterior leads    Referred By: [email or web address]   SELF           Confirmed By:         Imaging Results          X-Ray Chest 1 View (Final result)  Result time 04/28/23 07:10:11    Final result by Jarret Dunaway MD (04/28/23 07:10:11)                 Narrative:    Reason: Abdominal pain.    FINDINGS:    Portable chest with comparison chest x-ray June 6, 2021 show normal cardiomediastinal silhouette. There is prominence of the central hilar vascular structures.  Lungs are clear. Pulmonary vasculature is normal. No acute osseous abnormality.    IMPRESSION:    Prominence of the central hilar vascular structures could reflect pulmonary vascular congestion.    Electronically signed by:  Jarret Dunaway DO  4/28/2023 7:10 AM CDT Workstation: [id number]                               Lab Results   Component Value Date    WBC 8.23 04/28/2023    HGB 12.0 (L) 04/28/2023    HCT 35.4 (L) 04/28/2023     (H) 04/28/2023    PLT 86 (L) 04/28/2023     BMP  Lab Results   Component Value Date     04/28/2023    K 3.5 04/28/2023     04/28/2023    CO2 22 (L) 04/28/2023    BUN 19 04/28/2023    CREATININE 1.7 (H) 04/28/2023    CALCIUM 8.6 (L) 04/28/2023    ANIONGAP 10 04/28/2023     04/28/2023     (H) 01/11/2023    GLU 96  12/28/2022       Results for orders placed during the hospital encounter of 07/18/22    Echo Saline Bubble? No    Interpretation Summary  · The left ventricle is mildly enlarged with normal systolic function.  · The estimated ejection fraction is 63%.  · Indeterminate left ventricular diastolic function.  · Normal right ventricular size with normal right ventricular systolic function.  · Mild left atrial enlargement.  · Mild right atrial enlargement.  · Trivial posterior pericardial effusion. And outside the RA.        Pre-op Assessment    I have reviewed the Patient Summary Reports.     I have reviewed the Nursing Notes. I have reviewed the NPO Status.   I have reviewed the Medications.     Review of Systems  Anesthesia Hx:  No problems with previous Anesthesia  Denies Family Hx of Anesthesia complications.   Denies Personal Hx of Anesthesia complications.   Social:  Former Smoker, Alcohol Use  Alcohol Use:   Alcohol Abuse:   Hematology/Oncology:     Oncology Normal    -- Anemia: Anemia of Chronic Kidney Disease  Acute and macrocytic   -- Thrombocytopenia:  Hematology Comments: History of pulmonary embolus (PE)    Thrombocytopenia    EENT/Dental:EENT/Dental Normal   Cardiovascular:   Hypertension, poorly controlled CHF ECG has been reviewed.  Congestive Heart Failure (CHF)  Hypertension    Pulmonary:   History of pulmonary embolus (PE)   Renal/:   Chronic Renal Disease ATN (acute tubular necrosis) Kidney Function/Disease, Chronic Kidney Disease (CKD) , CKD Stage III (GFR 30-59)  Other Renal / Gu Conditions: Hydronephrosis  Hepatic/GI:   GERD Liver Disease, History of GI bleed    Gastrointestinal hemorrhage with hematemesis Esophageal / Stomach Disorders Gerd, Esophageal Disorder, Esophageal Varices  Liver Disease, Alcoholic Liver Disease , Cirrhosis Ascites, Esophageal Varices, Thrombocytopenia    Musculoskeletal:   Arthritis   Bone Disorders: AVN (avascular necrosis of bone)   Neurological:  Neurology Normal     Endocrine:  Endocrine Normal    Dermatological:  Skin Normal    Psych:  Psychiatric Normal           Physical Exam  General: Well nourished, Cooperative, Alert and Oriented    Airway:  Mallampati: III   Mouth Opening: Normal  TM Distance: Normal  Neck ROM: Normal ROM    Dental:  Intact    Chest/Lungs:  Clear to auscultation, Normal Respiratory Rate    Heart:  Rate: Tachycardia  Rhythm: Regular Rhythm  Sounds: Normal        Anesthesia Plan  Type of Anesthesia, risks & benefits discussed:    Anesthesia Type: Gen ETT  Intra-op Monitoring Plan: Standard ASA Monitors  Induction:  rapid sequence and IV  Airway Plan: Direct and Video, Post-Induction  ASA Score: 4  Anesthesia Plan Notes:     GETA  RSI  Decadron 8 mg, iv Zofran 4 mg iv, Pepcid 20 mg iv       Ready For Surgery From Anesthesia Perspective.     .

## 2023-04-28 NOTE — ANESTHESIA POSTPROCEDURE EVALUATION
Anesthesia Post Evaluation    Patient: Irvin Diaz     Procedure(s) Performed: Procedure(s) (LRB):  EGD (ESOPHAGOGASTRODUODENOSCOPY) (N/A)    Final Anesthesia Type: general      Patient location during evaluation: PACU  Patient participation: Yes- Able to Participate  Level of consciousness: awake and alert  Post-procedure vital signs: reviewed and stable  Pain management: adequate  Airway patency: patent    PONV status at discharge: No PONV  Anesthetic complications: no      Cardiovascular status: blood pressure returned to baseline and stable  Respiratory status: unassisted and room air  Hydration status: euvolemic  Follow-up not needed.          Vitals Value Taken Time   /91 04/28/23 1512   Temp 99.1 04/28/23 1514   Pulse 93 04/28/23 1512   Resp 21 04/28/23 1512   SpO2 95 % 04/28/23 1512   Vitals shown include unvalidated device data.      Event Time   Out of Recovery 15:05:34         Pain/Leno Score: Pain Rating Prior to Med Admin: 9 (4/28/2023  7:16 AM)  Pain Rating Post Med Admin: 8 (4/28/2023  7:45 AM)  Leno Score: 9 (4/28/2023  2:30 PM)

## 2023-04-28 NOTE — ED PROVIDER NOTES
Encounter Date: 4/28/2023       History     Chief Complaint   Patient presents with    Abdominal Pain    Vomiting     Dark red, coffee ground, since 1am     Patient presents emergency department with reported vomiting blood onset this morning arrival emergency department patient is tachycardic with active emesis denies any melena reports upper abdominal pain patient reports history of esophageal varices secondary to cirrhosis states that he had been off alcohol for some time but started drinking again 2 weeks ago denies any fever chills he denies any noted melena      Review of patient's allergies indicates:   Allergen Reactions    Ciprofloxacin hcl Hallucinations    Meperidine Hives     Pt medicated w/multiple medications (demerol, protonix, and zofran)  w/i 10 mins time frame prior to developing localized hives near IV site that med was administered. Pt reports he has/takes all other medications on daily basis.     Morphine Itching    Nsaids (non-steroidal anti-inflammatory drug)      Kidney Disease    Tylenol [acetaminophen]      Hx of liver disease     Past Medical History:   Diagnosis Date    Alcohol withdrawal 5/1/2022    Alcoholic cirrhosis of liver     Alcoholic ketoacidosis 6/6/2021    Arthritis     ATN (acute tubular necrosis)     CHF (congestive heart failure)     Diverticulitis 01/2020    Esophageal varices     GERD (gastroesophageal reflux disease)     GI bleed     Hip arthritis     Left    Hypertension     Liver cirrhosis     Macrocytic anemia     Pulmonary embolism 08/2018    Unprovoked DVT.  Stop Coumadin due to GIB    Thrombocytopenia      Past Surgical History:   Procedure Laterality Date    ANKLE SURGERY      BLOCK, NERVE, PERIPHERAL Left 06/24/2022    Procedure: Left Hip femoral-obturator accessory nerve block;  Surgeon: Robbin De La Garza DO;  Location: [facility];  Service: Pain Management;  Laterality: Left;    COLON SURGERY  2007    COLONOSCOPY  09/05/2019    [facility]    COLONOSCOPY N/A  02/17/2021    Procedure: COLONOSCOPY;  Surgeon: Renea Billingsley MD;  Location: [facility];  Service: Endoscopy;  Laterality: N/A;    COLONOSCOPY N/A 2/13/2023    Procedure: COLONOSCOPY;  Surgeon: Rudy Orellana MD;  [facility]  Service: Endoscopy;  Laterality: N/A;  cirrhosis-labs done on 1/11/23  instr portal-GT  No answer for precall- [place]    COLONOSCOPY N/A 3/10/2023    Procedure: COLONOSCOPY;  Surgeon: Rudy Orellana MD;  [facility]  Service: Endoscopy;  Laterality: N/A;  inst via poral per pt request    COLONOSCOPY W/ BIOPSIES  02/17/2021    ESOPHAGOGASTRODUODENOSCOPY N/A 07/26/2019    Procedure: EGD (ESOPHAGOGASTRODUODENOSCOPY);  Surgeon: Brian Trivedi MD;  Location: [facility];  Service: Endoscopy;  Laterality: N/A;    ESOPHAGOGASTRODUODENOSCOPY N/A 04/08/2020    Procedure: EGD (ESOPHAGOGASTRODUODENOSCOPY);  Surgeon: Donn Acuna MD;  Location: [facility];  Service: Endoscopy;  Laterality: N/A;    ESOPHAGOGASTRODUODENOSCOPY N/A 01/03/2021    Procedure: EGD (ESOPHAGOGASTRODUODENOSCOPY)- coffee ground emesis, hx varices;  Surgeon: Steve Chairez MD;  Location: [facility];  Service: Endoscopy;  Laterality: N/A;    ESOPHAGOGASTRODUODENOSCOPY N/A 05/03/2021    Procedure: EGD (ESOPHAGOGASTRODUODENOSCOPY);  Surgeon: Jeanmarie Ngo MD;  Location: [facility];  Service: Endoscopy;  Laterality: N/A;    ESOPHAGOGASTRODUODENOSCOPY N/A 07/19/2021    Procedure: ESOPHAGOGASTRODUODENOSCOPY (EGD);  Surgeon: Claudio Medeiros MD;  Location: [facility];  Service: Endoscopy;  Laterality: N/A;    ESOPHAGOGASTRODUODENOSCOPY  12/20/2021    ESOPHAGOGASTRODUODENOSCOPY N/A 12/20/2021    Procedure: EGD (ESOPHAGOGASTRODUODENOSCOPY);  Surgeon: Ajay Jackson MD;  Location: [facility];  Service: Endoscopy;  Laterality: N/A;    ESOPHAGOGASTRODUODENOSCOPY N/A 05/03/2022    Procedure: EGD (ESOPHAGOGASTRODUODENOSCOPY);  Surgeon: Brian Trivedi MD;  Location: [facility];  Service: Endoscopy;   Laterality: N/A;    ESOPHAGOGASTRODUODENOSCOPY N/A 2022    Procedure: EGD (ESOPHAGOGASTRODUODENOSCOPY);  Surgeon: Ajay Jackson MD;  Location: [facility];  Service: Endoscopy;  Laterality: N/A;    ESOPHAGOGASTRODUODENOSCOPY N/A 2022    Procedure: EGD (ESOPHAGOGASTRODUODENOSCOPY);  Surgeon: Edouard Maynard MD;  [facility]  Service: Endoscopy;  Laterality: N/A;    HERNIA REPAIR Left     Inguinal    UPPER GASTROINTESTINAL ENDOSCOPY N/A 2022     Family History   Problem Relation Age of Onset    Hypertension Mother     Breast cancer Mother     Hypertension Father     Prostate cancer Father     Bladder Cancer Father      Social History     Tobacco Use    Smoking status: Former     Types: Cigarettes     Quit date:      Years since quittin.3    Smokeless tobacco: Never    Tobacco comments:     quit 20 years ago   Substance Use Topics    Alcohol use: Not Currently     Comment: Quit drinking 21    Drug use: Not Currently     Review of Systems   Constitutional:  Negative for fever.   HENT:  Negative for congestion.    Respiratory:  Negative for shortness of breath.    Gastrointestinal:  Positive for abdominal pain, nausea and vomiting. Negative for abdominal distention, anal bleeding, blood in stool and diarrhea.   Musculoskeletal:  Negative for back pain.   Skin:  Negative for rash.   All other systems reviewed and are negative.    Physical Exam     Initial Vitals [23 0553]   BP Pulse Resp Temp SpO2   (!) 212/119 (!) 116 (!) 22 98.4 °F (36.9 °C) 98 %      MAP       --         Physical Exam    Constitutional: He appears well-developed and well-nourished. He appears distressed.   HENT:   Head: Normocephalic and atraumatic.   Right Ear: External ear normal.   Left Ear: External ear normal.   Mouth/Throat: Oropharynx is clear and moist.   Eyes: EOM are normal. Pupils are equal, round, and reactive to light.   Neck: Neck supple.   Normal range of motion.  Cardiovascular:   Regular rhythm, S1 normal, S2 normal, normal heart sounds and intact distal pulses.           Tachycardic   Pulmonary/Chest: Breath sounds normal.   Abdominal: Abdomen is soft. Bowel sounds are normal. There is abdominal tenderness in the right upper quadrant and epigastric area.   Musculoskeletal:         General: Normal range of motion.      Cervical back: Normal range of motion and neck supple.     Neurological: He is alert and oriented to person, place, and time. GCS score is 15. GCS eye subscore is 4. GCS verbal subscore is 5. GCS motor subscore is 6.   Skin: Skin is warm and dry. Capillary refill takes less than 2 seconds. No rash noted. There is pallor.   Psychiatric: He has a normal mood and affect. His behavior is normal.       ED Course   Procedures  Labs Reviewed   CBC W/ AUTO DIFFERENTIAL - Abnormal; Notable for the following components:       Result Value    RBC 3.46 (*)     Hemoglobin 12.0 (*)     Hematocrit 35.4 (*)      (*)     MCH 34.7 (*)     RDW 15.7 (*)     Platelets 86 (*)     Immature Granulocytes 0.7 (*)     Immature Grans (Abs) 0.06 (*)     Gran % 77.6 (*)     Lymph % 15.1 (*)     All other components within normal limits   COMPREHENSIVE METABOLIC PANEL - Abnormal; Notable for the following components:    CO2 22 (*)     Creatinine 1.7 (*)     Calcium 8.6 (*)     Total Protein 8.9 (*)     Albumin 3.2 (*)     Total Bilirubin 4.4 (*)     Alkaline Phosphatase 151 (*)     AST 57 (*)     eGFR 49.1 (*)     All other components within normal limits   PROTIME-INR - Abnormal; Notable for the following components:    Prothrombin Time 13.5 (*)     INR 1.3 (*)     All other components within normal limits   LIPASE - Abnormal; Notable for the following components:    Lipase 72 (*)     All other components within normal limits   TROPONIN I HIGH SENSITIVITY - Abnormal; Notable for the following components:    Troponin I High Sensitivity 47.5 (*)     All other components within normal limits   APTT    B-TYPE NATRIURETIC PEPTIDE   TYPE & SCREEN          Imaging Results              X-Ray Chest 1 View (Final result)  Result time 04/28/23 07:10:11      Final result by Jarret Dunaway MD (04/28/23 07:10:11)                   Narrative:    Reason: Abdominal pain.    FINDINGS:    Portable chest with comparison chest x-ray June 6, 2021 show normal cardiomediastinal silhouette. There is prominence of the central hilar vascular structures.  Lungs are clear. Pulmonary vasculature is normal. No acute osseous abnormality.    IMPRESSION:    Prominence of the central hilar vascular structures could reflect pulmonary vascular congestion.    Electronically signed by:  Jarret Dunaway DO  4/28/2023 7:10 AM CDT Workstation: [id number]                                     Medications   cefTRIAXone (ROCEPHIN) 1 g in dextrose 5 % 100 mL IVPB (ready to mix) (has no administration in time range)   pantoprazole 40 mg in  mL infusion (ready to mix) (has no administration in time range)   HYDROmorphone injection 0.5 mg (has no administration in time range)   sodium chloride 0.9% bolus 1,000 mL 1,000 mL (0 mLs Intravenous Stopped 4/28/23 0700)   pantoprazole injection 80 mg (80 mg Intravenous Given 4/28/23 0559)   ondansetron injection 8 mg (8 mg Intravenous Given 4/28/23 0603)   LORazepam injection 1 mg (1 mg Intravenous Given 4/28/23 0609)     Medical Decision Making:   Differential Diagnosis:   Esophageal varices gastritis esophagitis ulcer pancreatitis  Clinical Tests:   Lab Tests: Ordered and Reviewed  Radiological Study: Ordered and Reviewed  Medical Tests: Ordered and Reviewed  ED Management:  IV protomnix zofran type and screen type and screen [facility] fluid [facility] have consult to G[facility] Hospital Medicine will admit to intensive care unit for further treatment of gastrointestinal hemorrhage                        Clinical Impression:   Final diagnoses:  [K92.2] GI  bleed  [R10.9] Abdominal pain        ED Disposition Condition    Discharge Stable          ED Prescriptions    None       Follow-up Information    None          Wilfredo Mcdonald MD  04/28/23 0727

## 2023-04-29 VITALS
TEMPERATURE: 98 F | BODY MASS INDEX: 27.64 KG/M2 | OXYGEN SATURATION: 97 % | HEIGHT: 72 IN | SYSTOLIC BLOOD PRESSURE: 144 MMHG | HEART RATE: 73 BPM | DIASTOLIC BLOOD PRESSURE: 82 MMHG | RESPIRATION RATE: 18 BRPM | WEIGHT: 204.06 LBS

## 2023-04-29 LAB
ALBUMIN SERPL BCP-MCNC: 2.8 G/DL (ref 3.5–5.2)
ALBUMIN SERPL BCP-MCNC: 2.9 G/DL (ref 3.5–5.2)
ALP SERPL-CCNC: 137 U/L (ref 55–135)
ALP SERPL-CCNC: 142 U/L (ref 55–135)
ALT SERPL W/O P-5'-P-CCNC: 24 U/L (ref 10–44)
ALT SERPL W/O P-5'-P-CCNC: 24 U/L (ref 10–44)
ANION GAP SERPL CALC-SCNC: 6 MMOL/L (ref 8–16)
ANION GAP SERPL CALC-SCNC: 7 MMOL/L (ref 8–16)
AST SERPL-CCNC: 48 U/L (ref 10–40)
AST SERPL-CCNC: 49 U/L (ref 10–40)
BASOPHILS # BLD AUTO: 0.01 K/UL (ref 0–0.2)
BASOPHILS NFR BLD: 0.2 % (ref 0–1.9)
BILIRUB SERPL-MCNC: 4.6 MG/DL (ref 0.1–1)
BILIRUB SERPL-MCNC: 4.7 MG/DL (ref 0.1–1)
BUN SERPL-MCNC: 22 MG/DL (ref 6–20)
BUN SERPL-MCNC: 23 MG/DL (ref 6–20)
CALCIUM SERPL-MCNC: 8.3 MG/DL (ref 8.7–10.5)
CALCIUM SERPL-MCNC: 8.6 MG/DL (ref 8.7–10.5)
CHLORIDE SERPL-SCNC: 102 MMOL/L (ref 95–110)
CHLORIDE SERPL-SCNC: 104 MMOL/L (ref 95–110)
CO2 SERPL-SCNC: 22 MMOL/L (ref 23–29)
CO2 SERPL-SCNC: 24 MMOL/L (ref 23–29)
CREAT SERPL-MCNC: 1.9 MG/DL (ref 0.5–1.4)
CREAT SERPL-MCNC: 2 MG/DL (ref 0.5–1.4)
DIFFERENTIAL METHOD: ABNORMAL
EOSINOPHIL # BLD AUTO: 0 K/UL (ref 0–0.5)
EOSINOPHIL NFR BLD: 0 % (ref 0–8)
ERYTHROCYTE [DISTWIDTH] IN BLOOD BY AUTOMATED COUNT: 15.1 % (ref 11.5–14.5)
EST. GFR  (NO RACE VARIABLE): 40.4 ML/MIN/1.73 M^2
EST. GFR  (NO RACE VARIABLE): 43 ML/MIN/1.73 M^2
GLUCOSE SERPL-MCNC: 120 MG/DL (ref 70–110)
GLUCOSE SERPL-MCNC: 184 MG/DL (ref 70–110)
HCT VFR BLD AUTO: 33.7 % (ref 40–54)
HGB BLD-MCNC: 11.2 G/DL (ref 14–18)
IMM GRANULOCYTES # BLD AUTO: 0.03 K/UL (ref 0–0.04)
IMM GRANULOCYTES NFR BLD AUTO: 0.5 % (ref 0–0.5)
LYMPHOCYTES # BLD AUTO: 0.8 K/UL (ref 1–4.8)
LYMPHOCYTES NFR BLD: 12.4 % (ref 18–48)
MAGNESIUM SERPL-MCNC: 2 MG/DL (ref 1.6–2.6)
MCH RBC QN AUTO: 34.5 PG (ref 27–31)
MCHC RBC AUTO-ENTMCNC: 33.2 G/DL (ref 32–36)
MCV RBC AUTO: 104 FL (ref 82–98)
MONOCYTES # BLD AUTO: 0.5 K/UL (ref 0.3–1)
MONOCYTES NFR BLD: 7.2 % (ref 4–15)
NEUTROPHILS # BLD AUTO: 5.2 K/UL (ref 1.8–7.7)
NEUTROPHILS NFR BLD: 79.7 % (ref 38–73)
NRBC BLD-RTO: 0 /100 WBC
PHOSPHATE SERPL-MCNC: 4.1 MG/DL (ref 2.7–4.5)
PLATELET # BLD AUTO: 78 K/UL (ref 150–450)
PMV BLD AUTO: 10.7 FL (ref 9.2–12.9)
POTASSIUM SERPL-SCNC: 4.4 MMOL/L (ref 3.5–5.1)
POTASSIUM SERPL-SCNC: 4.6 MMOL/L (ref 3.5–5.1)
PROT SERPL-MCNC: 8 G/DL (ref 6–8.4)
PROT SERPL-MCNC: 8.3 G/DL (ref 6–8.4)
RBC # BLD AUTO: 3.25 M/UL (ref 4.6–6.2)
SODIUM SERPL-SCNC: 132 MMOL/L (ref 136–145)
SODIUM SERPL-SCNC: 133 MMOL/L (ref 136–145)
WBC # BLD AUTO: 6.54 K/UL (ref 3.9–12.7)

## 2023-04-29 PROCEDURE — 63600175 PHARM REV CODE 636 W HCPCS: Performed by: STUDENT IN AN ORGANIZED HEALTH CARE EDUCATION/TRAINING PROGRAM

## 2023-04-29 PROCEDURE — 85025 COMPLETE CBC W/AUTO DIFF WBC: CPT | Performed by: STUDENT IN AN ORGANIZED HEALTH CARE EDUCATION/TRAINING PROGRAM

## 2023-04-29 PROCEDURE — 25000003 PHARM REV CODE 250: Performed by: INTERNAL MEDICINE

## 2023-04-29 PROCEDURE — 25000003 PHARM REV CODE 250: Performed by: STUDENT IN AN ORGANIZED HEALTH CARE EDUCATION/TRAINING PROGRAM

## 2023-04-29 PROCEDURE — 36415 COLL VENOUS BLD VENIPUNCTURE: CPT | Performed by: STUDENT IN AN ORGANIZED HEALTH CARE EDUCATION/TRAINING PROGRAM

## 2023-04-29 PROCEDURE — 84100 ASSAY OF PHOSPHORUS: CPT | Performed by: STUDENT IN AN ORGANIZED HEALTH CARE EDUCATION/TRAINING PROGRAM

## 2023-04-29 PROCEDURE — C9113 INJ PANTOPRAZOLE SODIUM, VIA: HCPCS | Performed by: STUDENT IN AN ORGANIZED HEALTH CARE EDUCATION/TRAINING PROGRAM

## 2023-04-29 PROCEDURE — 80053 COMPREHEN METABOLIC PANEL: CPT | Mod: 91 | Performed by: STUDENT IN AN ORGANIZED HEALTH CARE EDUCATION/TRAINING PROGRAM

## 2023-04-29 PROCEDURE — 83735 ASSAY OF MAGNESIUM: CPT | Performed by: STUDENT IN AN ORGANIZED HEALTH CARE EDUCATION/TRAINING PROGRAM

## 2023-04-29 RX ORDER — PANTOPRAZOLE SODIUM 40 MG/1
80 TABLET, DELAYED RELEASE ORAL 2 TIMES DAILY
Qty: 60 TABLET | Refills: 11
Start: 2023-04-29 | End: 2023-06-21

## 2023-04-29 RX ORDER — PANTOPRAZOLE SODIUM 40 MG/1
40 TABLET, DELAYED RELEASE ORAL 2 TIMES DAILY
Status: DISCONTINUED | OUTPATIENT
Start: 2023-04-29 | End: 2023-04-29 | Stop reason: HOSPADM

## 2023-04-29 RX ADMIN — SODIUM CHLORIDE, SODIUM LACTATE, POTASSIUM CHLORIDE, AND CALCIUM CHLORIDE 500 ML: .6; .31; .03; .02 INJECTION, SOLUTION INTRAVENOUS at 08:04

## 2023-04-29 RX ADMIN — THIAMINE HCL TAB 100 MG 100 MG: 100 TAB at 09:04

## 2023-04-29 RX ADMIN — SODIUM CHLORIDE 8 MG/HR: 9 INJECTION, SOLUTION INTRAVENOUS at 05:04

## 2023-04-29 RX ADMIN — AMLODIPINE BESYLATE 10 MG: 5 TABLET ORAL at 09:04

## 2023-04-29 RX ADMIN — HYDROMORPHONE HYDROCHLORIDE 0.5 MG: 1 INJECTION, SOLUTION INTRAMUSCULAR; INTRAVENOUS; SUBCUTANEOUS at 02:04

## 2023-04-29 RX ADMIN — CARVEDILOL 12.5 MG: 12.5 TABLET, FILM COATED ORAL at 09:04

## 2023-04-29 RX ADMIN — RIFAXIMIN 550 MG: 550 TABLET ORAL at 09:04

## 2023-04-29 RX ADMIN — LACTULOSE 20 G: 20 SOLUTION ORAL at 09:04

## 2023-04-29 RX ADMIN — HYDROMORPHONE HYDROCHLORIDE 0.5 MG: 1 INJECTION, SOLUTION INTRAMUSCULAR; INTRAVENOUS; SUBCUTANEOUS at 07:04

## 2023-04-29 RX ADMIN — PANTOPRAZOLE SODIUM 40 MG: 40 TABLET, DELAYED RELEASE ORAL at 09:04

## 2023-04-29 NOTE — NURSING
Pt received d/c instructions. IV d/c'd. Ambulated with tech to car. Alert and oriented upon departure.

## 2023-04-29 NOTE — PLAN OF CARE
04/29/23 1154   Final Note   Assessment Type Final Discharge Note   Anticipated Discharge Disposition Home   What phone number can be called within the next 1-3 days to see how you are doing after discharge? [phone]   Post-Acute Status   Discharge Delays None known at this time     Patient cleared for discharge from case management standpoint.    Chart and discharge orders reviewed.  Patient discharged home with no further case management needs.

## 2023-05-01 ENCOUNTER — TELEPHONE (OUTPATIENT)
Dept: PRIMARY CARE CLINIC | Facility: CLINIC | Age: 48
End: 2023-05-01
Payer: MEDICARE

## 2023-05-01 NOTE — TELEPHONE ENCOUNTER
Spoke to patient and he stated he will call back, that he needs to get his schedule for work first.

## 2023-05-01 NOTE — TELEPHONE ENCOUNTER
----- Message from Edouard Gibson MD sent at 5/1/2023  8:58 AM CDT -----  Please schedule close hospital follow-up for patient    Edouard Gibson MD

## 2023-05-04 NOTE — DISCHARGE SUMMARY
[facility]  Discharge Summary  Patient Name: Irvin Diaz Jr. MRN: [id number]   Patient Class: IP- Inpatient  Length of Stay: 1   Admission Date: 4/28/2023  5:39 AM Attending Physician: Claudio Moe MD   Primary Care Provider: Edouard Gibson MD Face-to-Face encounter date: 4/29/23   Chief Complaint: Abdominal Pain and Vomiting (Dark red, coffee ground, since 1am)    Date of Discharge: 4/29/23  Discharge Disposition:Home or Self Care   Condition: Stable       Reason for Hospitalization     Active Hospital Problems    Diagnosis    *Gastrointestinal hemorrhage with hematemesis         Brief History of Present Illness    Irvin Diaz Jr. is a 47 y.o.  male who  has a past medical history of Alcohol withdrawal (5/1/2022), Alcoholic cirrhosis of liver, Alcoholic ketoacidosis (6/6/2021), Arthritis, ATN (acute tubular necrosis), CHF (congestive heart failure), Diverticulitis (01/2020), Esophageal varices, GERD (gastroesophageal reflux disease), GI bleed, Hip arthritis, Hypertension, Liver cirrhosis, Macrocytic anemia, Pulmonary embolism (08/2018), and Thrombocytopenia.. The patient presented to [facility] on 4/28/2023 with a primary complaint of Abdominal Pain and Vomiting (Dark red, coffee ground, since 1am)  .     For the full HPI please refer to the History & Physical from this admission.    Hospital Course By Problem with Pertinent Findings     Upper GIB  Hx of cirrhosis and variceal bleeding  IVF resuscitation   1 unit prbc ordered in ER  2 large bore PIVs  Protonix and octreotide infusion  GI consulted  Plan for EGD today  Trend hgb    Patient underwent endoscopy after intubation. There was no active bleeding. There was no blood in the stomach. There was active esophagitis. No significant varices seen.    Recommendation high-dose proton pump inhibitor. May advance diet. Serial H&Hs.  observe in the hospital for 24 hours.       Patient was seen and examined on the date  of discharge and determined to be suitable for discharge.    Physical Exam  BP (!) 144/82 (BP Location: Left arm, Patient Position: Lying)   Pulse 73   Temp 97.7 °F (36.5 °C) (Oral)   Resp 18   Ht 6' (1.829 m)   Wt 92.5 kg (204 lb 0.6 oz)   SpO2 97%   BMI 27.67 kg/m²   Vitals reviewed.    Constitutional: No distress.   HENT: Atraumatic.   Cardiovascular: Normal rate, regular rhythm.  S1 S2.    Pulmonary/Chest: Effort normal. Clear to auscultation bilaterally. No wheezes.   Abdominal: Soft. Bowel sounds are normal. Exhibits no distension and no mass. No tenderness  Neurological: Alert.   Skin: Skin is warm and dry.     Following labs were Reviewed   No results for input(s): WBC, HGB, HCT, PLT, GLUCOSE, CALCIUM, ALBUMIN, PROT, NA, K, CO2, CL, BUN, CREATININE, ALKPHOS, ALT, AST, BILITOT in the last 24 hours.  No results found for: POCTGLUCOSE     All labs within the past 24 hours have been reviewed    Microbiology Results (last 7 days)       ** No results found for the last 168 hours. **          X-Ray Chest 1 View   Final Result          No results found for this or any previous visit.      Consultants and Procedures   Consultants:      Procedures:   Procedure(s) (LRB):  EGD (ESOPHAGOGASTRODUODENOSCOPY) (N/A)     Discharge Information:   Diet:  Resume Cardiac diet/Diabetic Diet    Physical Activity:  Activity as tolerated    Instructions:  1. Take all medications as prescribed  2. Keep all follow-up appointments  3. Return to the hospital or call your primary care physicians if any worsening symptoms such as chest pain, shortness of breath, bleeding,  intractable pain, fever >101 occur.      Follow-Up Appointments:  Please call your primary care physician to schedule an appointment in 1 week time.     Follow-up Information       Edouard Gibson MD Follow up in 1 week(s).    Specialties: Family Medicine, Hospitalist  Contact information:  [address] [place] [address] [phone]                Caesar Dickens MD Follow up in 1 week(s).    Specialty: Gastroenterology  Contact information:  [address] [facility]  [address]  [phone]                               Pending laboratory work/Tests to be performed/followed by the Primary care Physician:    The patient was discharged with discharge instructions reviewed in written and verbal form. All questions were answered and prescriptions were provided. The importance of making follow up appointments and compliance of medications has been emphasized. The patient will follow up in 1 week or sooner as needed with the PCP. Tthe patient understands and agrees with the plan. Upon discharge, patient needs to be on following medications.    Discharge Medications:     Medication List        CHANGE how you take these medications      pantoprazole 40 MG tablet  Commonly known as: PROTONIX  Take 2 tablets (80 mg total) by mouth 2 (two) times daily.  What changed: how much to take            CONTINUE taking these medications      acamprosate 333 mg tablet  Commonly known as: CAMPRAL  Take 2 tablets (666 mg total) by mouth 3 (three) times daily.     amLODIPine 10 MG tablet  Commonly known as: NORVASC  Take 1 tablet (10 mg total) by mouth once daily.     augmented betamethasone dipropionate 0.05 % cream  Commonly known as: DIPROLENE-AF  Apply topically 2 (two) times daily.     calcitRIOL 0.25 MCG Cap  Commonly known as: ROCALTROL  Take 1 capsule (0.25 mcg total) by mouth once daily.     carvediloL 12.5 MG tablet  Commonly known as: COREG  Take 1 tablet (12.5 mg total) by mouth 2 (two) times daily with meals.     clindamycin-benzoyl peroxide gel  Commonly known as: BENZACLIN  Apply topically 2 (two) times daily.     FERATE 240 (27 FE) MG tablet  Generic drug: ferrous gluconate  Take 2 tablets (480 mg total) by mouth 2 (two) times daily with meals.     folic acid 1 MG tablet  Commonly known as: FOLVITE  Take 1 tablet (1 mg total) by mouth once daily.      furosemide 20 MG tablet  Commonly known as: LASIX  Take 1 tablet (20 mg total) by mouth once daily.     nortriptyline 50 MG capsule  Commonly known as: PAMELOR  Take 1 capsule (50 mg total) by mouth nightly as needed (pain and insomnia).     sildenafiL 100 MG tablet  Commonly known as: VIAGRA  Take 1 tablet (100 mg total) by mouth daily as needed for Erectile Dysfunction.     spironolactone 50 MG tablet  Commonly known as: ALDACTONE  Take 1 tablet (50 mg total) by mouth once daily.     thiamine 100 MG tablet  Take 1 tablet (100 mg total) by mouth once daily.     XIFAXAN 550 mg Tab  Generic drug: rifAXIMin  Take 1 tablet (550 mg total) by mouth 2 (two) times daily.            ASK your doctor about these medications      lactulose 10 gram/15 mL solution  Commonly known as: CHRONULAC  Take 30 mLs (20 g total) by mouth 3 (three) times daily. Take enough to have 2 to 3 bowel movements a day to prevent hepatic encephalopathy.     traMADoL 50 mg tablet  Commonly known as: ULTRAM  Take 1 tablet (50 mg total) by mouth 3 (three) times daily as needed for Pain.  Ask about: Should I take this medication?               Where to Get Your Medications        Information about where to get these medications is not yet available    Ask your nurse or doctor about these medications  pantoprazole 40 MG tablet           I spent 31 minutes preparing the discharge for this patient including reviewing records from previous encounters, preparation of discharge summary, assessing and final examination of the patient, discharge medicine reconciliation, discussing plan of care, follow up and education and prescriptions.       Claudio Moe  [facility] Hospitalist

## 2023-05-13 ENCOUNTER — HOSPITAL ENCOUNTER (EMERGENCY)
Facility: HOSPITAL | Age: 48
Discharge: HOME | End: 2023-05-13
Attending: EMERGENCY MEDICINE
Payer: MEDICARE

## 2023-05-13 VITALS
HEART RATE: 102 BPM | DIASTOLIC BLOOD PRESSURE: 78 MMHG | RESPIRATION RATE: 19 BRPM | SYSTOLIC BLOOD PRESSURE: 154 MMHG | BODY MASS INDEX: 27.8 KG/M2 | WEIGHT: 205 LBS | TEMPERATURE: 99 F | OXYGEN SATURATION: 96 %

## 2023-05-13 DIAGNOSIS — R74.01 TRANSAMINITIS: ICD-10-CM

## 2023-05-13 DIAGNOSIS — K70.30 ALCOHOLIC CIRRHOSIS OF LIVER WITHOUT ASCITES: Primary | Chronic | ICD-10-CM

## 2023-05-13 DIAGNOSIS — K70.31 ASCITES DUE TO ALCOHOLIC CIRRHOSIS: ICD-10-CM

## 2023-05-13 DIAGNOSIS — D63.1 ANEMIA IN CHRONIC KIDNEY DISEASE, UNSPECIFIED CKD STAGE: ICD-10-CM

## 2023-05-13 DIAGNOSIS — R10.10 PAIN OF UPPER ABDOMEN: ICD-10-CM

## 2023-05-13 DIAGNOSIS — N18.30 STAGE 3 CHRONIC KIDNEY DISEASE, UNSPECIFIED WHETHER STAGE 3A OR 3B CKD: Chronic | ICD-10-CM

## 2023-05-13 DIAGNOSIS — I10 ESSENTIAL HYPERTENSION: Chronic | ICD-10-CM

## 2023-05-13 DIAGNOSIS — N18.9 ANEMIA IN CHRONIC KIDNEY DISEASE, UNSPECIFIED CKD STAGE: ICD-10-CM

## 2023-05-13 DIAGNOSIS — D69.6 THROMBOCYTOPENIA: ICD-10-CM

## 2023-05-13 DIAGNOSIS — D64.9 ANEMIA, UNSPECIFIED TYPE: ICD-10-CM

## 2023-05-13 LAB
ALBUMIN SERPL BCP-MCNC: 3.4 G/DL (ref 3.5–5.2)
ALP SERPL-CCNC: 141 U/L (ref 55–135)
ALT SERPL W/O P-5'-P-CCNC: 28 U/L (ref 10–44)
AMMONIA PLAS-SCNC: 33 UMOL/L (ref 10–50)
ANION GAP SERPL CALC-SCNC: 7 MMOL/L (ref 8–16)
AST SERPL-CCNC: 58 U/L (ref 10–40)
BACTERIA #/AREA URNS HPF: NEGATIVE /HPF
BASOPHILS # BLD AUTO: 0.03 K/UL (ref 0–0.2)
BASOPHILS NFR BLD: 0.3 % (ref 0–1.9)
BILIRUB SERPL-MCNC: 3.5 MG/DL (ref 0.1–1)
BILIRUB UR QL STRIP: NEGATIVE
BUN SERPL-MCNC: 23 MG/DL (ref 6–20)
CALCIUM SERPL-MCNC: 8.8 MG/DL (ref 8.7–10.5)
CHLORIDE SERPL-SCNC: 107 MMOL/L (ref 95–110)
CLARITY UR: CLEAR
CO2 SERPL-SCNC: 23 MMOL/L (ref 23–29)
COLOR UR: YELLOW
CREAT SERPL-MCNC: 2 MG/DL (ref 0.5–1.4)
DIFFERENTIAL METHOD: ABNORMAL
EOSINOPHIL # BLD AUTO: 0.1 K/UL (ref 0–0.5)
EOSINOPHIL NFR BLD: 1.3 % (ref 0–8)
ERYTHROCYTE [DISTWIDTH] IN BLOOD BY AUTOMATED COUNT: 15 % (ref 11.5–14.5)
EST. GFR  (NO RACE VARIABLE): 40.4 ML/MIN/1.73 M^2
GLUCOSE SERPL-MCNC: 113 MG/DL (ref 70–110)
GLUCOSE UR QL STRIP: NEGATIVE
HCT VFR BLD AUTO: 32.1 % (ref 40–54)
HGB BLD-MCNC: 10.9 G/DL (ref 14–18)
HGB UR QL STRIP: ABNORMAL
HYALINE CASTS #/AREA URNS LPF: 0 /LPF
IMM GRANULOCYTES # BLD AUTO: 0.05 K/UL (ref 0–0.04)
IMM GRANULOCYTES NFR BLD AUTO: 0.5 % (ref 0–0.5)
KETONES UR QL STRIP: NEGATIVE
LEUKOCYTE ESTERASE UR QL STRIP: NEGATIVE
LIPASE SERPL-CCNC: 74 U/L (ref 4–60)
LYMPHOCYTES # BLD AUTO: 1.3 K/UL (ref 1–4.8)
LYMPHOCYTES NFR BLD: 14.4 % (ref 18–48)
MAGNESIUM SERPL-MCNC: 2 MG/DL (ref 1.6–2.6)
MCH RBC QN AUTO: 34.4 PG (ref 27–31)
MCHC RBC AUTO-ENTMCNC: 34 G/DL (ref 32–36)
MCV RBC AUTO: 101 FL (ref 82–98)
MICROSCOPIC COMMENT: ABNORMAL
MONOCYTES # BLD AUTO: 0.7 K/UL (ref 0.3–1)
MONOCYTES NFR BLD: 7.1 % (ref 4–15)
NEUTROPHILS # BLD AUTO: 7.1 K/UL (ref 1.8–7.7)
NEUTROPHILS NFR BLD: 76.4 % (ref 38–73)
NITRITE UR QL STRIP: NEGATIVE
NRBC BLD-RTO: 0 /100 WBC
PH UR STRIP: 7 [PH] (ref 5–8)
PLATELET # BLD AUTO: 69 K/UL (ref 150–450)
PMV BLD AUTO: 10.9 FL (ref 9.2–12.9)
POTASSIUM SERPL-SCNC: 3.8 MMOL/L (ref 3.5–5.1)
PROT SERPL-MCNC: 8.6 G/DL (ref 6–8.4)
PROT UR QL STRIP: NEGATIVE
RBC # BLD AUTO: 3.17 M/UL (ref 4.6–6.2)
RBC #/AREA URNS HPF: 5 /HPF (ref 0–4)
SODIUM SERPL-SCNC: 137 MMOL/L (ref 136–145)
SP GR UR STRIP: 1.01 (ref 1–1.03)
SQUAMOUS #/AREA URNS HPF: 0 /HPF
URN SPEC COLLECT METH UR: ABNORMAL
UROBILINOGEN UR STRIP-ACNC: NEGATIVE EU/DL
WBC # BLD AUTO: 9.29 K/UL (ref 3.9–12.7)
WBC #/AREA URNS HPF: 1 /HPF (ref 0–5)

## 2023-05-13 PROCEDURE — 83690 ASSAY OF LIPASE: CPT

## 2023-05-13 PROCEDURE — 81001 URINALYSIS AUTO W/SCOPE: CPT

## 2023-05-13 PROCEDURE — 80053 COMPREHEN METABOLIC PANEL: CPT

## 2023-05-13 PROCEDURE — 85025 COMPLETE CBC W/AUTO DIFF WBC: CPT

## 2023-05-13 PROCEDURE — 96374 THER/PROPH/DIAG INJ IV PUSH: CPT

## 2023-05-13 PROCEDURE — 83735 ASSAY OF MAGNESIUM: CPT | Performed by: EMERGENCY MEDICINE

## 2023-05-13 PROCEDURE — 99285 EMERGENCY DEPT VISIT HI MDM: CPT | Mod: 25

## 2023-05-13 PROCEDURE — 25000003 PHARM REV CODE 250: Performed by: EMERGENCY MEDICINE

## 2023-05-13 PROCEDURE — 63600175 PHARM REV CODE 636 W HCPCS: Performed by: EMERGENCY MEDICINE

## 2023-05-13 PROCEDURE — 36415 COLL VENOUS BLD VENIPUNCTURE: CPT | Performed by: EMERGENCY MEDICINE

## 2023-05-13 PROCEDURE — 82140 ASSAY OF AMMONIA: CPT | Performed by: EMERGENCY MEDICINE

## 2023-05-13 RX ORDER — HYDROMORPHONE HYDROCHLORIDE 1 MG/ML
0.5 INJECTION, SOLUTION INTRAMUSCULAR; INTRAVENOUS; SUBCUTANEOUS
Status: COMPLETED | OUTPATIENT
Start: 2023-05-13 | End: 2023-05-13

## 2023-05-13 RX ADMIN — SODIUM CHLORIDE 1000 ML: 0.9 INJECTION, SOLUTION INTRAVENOUS at 03:05

## 2023-05-13 RX ADMIN — HYDROMORPHONE HYDROCHLORIDE 0.5 MG: 0.5 INJECTION, SOLUTION INTRAMUSCULAR; INTRAVENOUS; SUBCUTANEOUS at 03:05

## 2023-05-13 NOTE — ED PROVIDER NOTES
Encounter Date: 5/13/2023       History     Chief Complaint   Patient presents with    Abdominal Pain     Upper abdominal pain.      Emergent evaluation of a 47-year-old male with history of alcoholic cirrhosis, esophageal varices, alcoholic ketoacidosis, diverticulitis, GERD, acute tubular necrosis, CHF, hypertension, prior PE, and upper GI bleed who presents to the ER due to generalized abdominal pain and distension he reports pain is worse in the upper abdomen he reports it is 10/10 with nausea minimal emesis with no blood.  He reports that he was also having diarrhea but no blood in the stool.  No fevers chills or sweats.  No weakness or dizziness  He reports that he is concerned he may be developing a variceal bleed    Review of patient's allergies indicates:   Allergen Reactions    Ciprofloxacin hcl Hallucinations    Meperidine Hives     Pt medicated w/multiple medications (demerol, protonix, and zofran)  w/i 10 mins time frame prior to developing localized hives near IV site that med was administered. Pt reports he has/takes all other medications on daily basis.     Morphine Itching    Nsaids (non-steroidal anti-inflammatory drug)      Kidney Disease    Tylenol [acetaminophen]      Hx of liver disease     Past Medical History:   Diagnosis Date    Alcohol withdrawal 5/1/2022    Alcoholic cirrhosis of liver     Alcoholic ketoacidosis 6/6/2021    Arthritis     ATN (acute tubular necrosis)     CHF (congestive heart failure)     Diverticulitis 01/2020    Esophageal varices     GERD (gastroesophageal reflux disease)     GI bleed     Hip arthritis     Left    Hypertension     Liver cirrhosis     Macrocytic anemia     Pulmonary embolism 08/2018    Unprovoked DVT.  Stop Coumadin due to GIB    Thrombocytopenia      Past Surgical History:   Procedure Laterality Date    ANKLE SURGERY      BLOCK, NERVE, PERIPHERAL Left 06/24/2022    Procedure: Left Hip femoral-obturator accessory nerve block;  Surgeon: Robbin  DO Frederic;  Location: [facility] OR;  Service: Pain Management;  Laterality: Left;    COLON SURGERY  2007    COLONOSCOPY  09/05/2019    [facility]    COLONOSCOPY N/A 02/17/2021    Procedure: COLONOSCOPY;  Surgeon: Renea Billingsley MD;  Location: [facility];  Service: Endoscopy;  Laterality: N/A;    COLONOSCOPY N/A 2/13/2023    Procedure: COLONOSCOPY;  Surgeon: Rudy Orellana MD;  [facility]  Service: Endoscopy;  Laterality: N/A;  cirrhosis-labs done on 1/11/23  instr portal-GT  No answer for precall- [place]    COLONOSCOPY N/A 3/10/2023    Procedure: COLONOSCOPY;  Surgeon: Rudy Orellana MD;  [facility]  Service: Endoscopy;  Laterality: N/A;  inst via poral per pt request    COLONOSCOPY W/ BIOPSIES  02/17/2021    ESOPHAGOGASTRODUODENOSCOPY N/A 07/26/2019    Procedure: EGD (ESOPHAGOGASTRODUODENOSCOPY);  Surgeon: Brian Trivedi MD;  Location: [facility];  Service: Endoscopy;  Laterality: N/A;    ESOPHAGOGASTRODUODENOSCOPY N/A 04/08/2020    Procedure: EGD (ESOPHAGOGASTRODUODENOSCOPY);  Surgeon: Donn Acuna MD;  Location: [facility];  Service: Endoscopy;  Laterality: N/A;    ESOPHAGOGASTRODUODENOSCOPY N/A 01/03/2021    Procedure: EGD (ESOPHAGOGASTRODUODENOSCOPY)- coffee ground emesis, hx varices;  Surgeon: Steve Chairez MD;  Location: [facility];  Service: Endoscopy;  Laterality: N/A;    ESOPHAGOGASTRODUODENOSCOPY N/A 05/03/2021    Procedure: EGD (ESOPHAGOGASTRODUODENOSCOPY);  Surgeon: Jeanmarie Ngo MD;  Location: [facility];  Service: Endoscopy;  Laterality: N/A;    ESOPHAGOGASTRODUODENOSCOPY N/A 07/19/2021    Procedure: ESOPHAGOGASTRODUODENOSCOPY (EGD);  Surgeon: Claudio Medeiros MD;  Location: [facility];  Service: Endoscopy;  Laterality: N/A;    ESOPHAGOGASTRODUODENOSCOPY  12/20/2021    ESOPHAGOGASTRODUODENOSCOPY N/A 12/20/2021    Procedure: EGD (ESOPHAGOGASTRODUODENOSCOPY);  Surgeon: Ajay Jackson MD;  Location: [facility];  Service: Endoscopy;  Laterality: N/A;     ESOPHAGOGASTRODUODENOSCOPY N/A 2022    Procedure: EGD (ESOPHAGOGASTRODUODENOSCOPY);  Surgeon: Brian Trivedi MD;  Location: [facility];  Service: Endoscopy;  Laterality: N/A;    ESOPHAGOGASTRODUODENOSCOPY N/A 2022    Procedure: EGD (ESOPHAGOGASTRODUODENOSCOPY);  Surgeon: Ajay Jackson MD;  Location: [facility];  Service: Endoscopy;  Laterality: N/A;    ESOPHAGOGASTRODUODENOSCOPY N/A 2022    Procedure: EGD (ESOPHAGOGASTRODUODENOSCOPY);  Surgeon: Edouard Maynard MD;  [facility]  Service: Endoscopy;  Laterality: N/A;    ESOPHAGOGASTRODUODENOSCOPY N/A 2023    Procedure: EGD (ESOPHAGOGASTRODUODENOSCOPY);  Surgeon: Caesar Dickens MD;  Location: [facility];  Service: Endoscopy;  Laterality: N/A;    HERNIA REPAIR Left     Inguinal    UPPER GASTROINTESTINAL ENDOSCOPY N/A 2022     Family History   Problem Relation Age of Onset    Hypertension Mother     Breast cancer Mother     Hypertension Father     Prostate cancer Father     Bladder Cancer Father      Social History     Tobacco Use    Smoking status: Former     Types: Cigarettes     Quit date: 2000     Years since quittin.3    Smokeless tobacco: Never    Tobacco comments:     quit 20 years ago   Substance Use Topics    Alcohol use: Not Currently     Comment: Quit drinking 21    Drug use: Not Currently     Review of Systems   Constitutional:  Negative for activity change, appetite change, chills, diaphoresis, fatigue and fever.   HENT:  Negative for congestion, postnasal drip and rhinorrhea.    Respiratory:  Negative for cough, chest tightness, shortness of breath and wheezing.    Cardiovascular:  Negative for chest pain and palpitations.   Gastrointestinal:  Positive for abdominal distention, abdominal pain, diarrhea, nausea and vomiting. Negative for constipation.   Genitourinary:  Negative for dysuria, frequency and urgency.   Musculoskeletal:  Negative for neck pain and neck stiffness.   Neurological:   Negative for dizziness, weakness, light-headedness, numbness and headaches.   All other systems reviewed and are negative.    Physical Exam     Initial Vitals [05/13/23 0213]   BP Pulse Resp Temp SpO2   (!) 185/96 (!) 114 20 99.1 °F (37.3 °C) 99 %      MAP       --         Physical Exam    Nursing note and vitals reviewed.  Constitutional: He appears well-developed and well-nourished. He is not diaphoretic. No distress.   HENT:   Head: Normocephalic and atraumatic.   Right Ear: External ear normal.   Left Ear: External ear normal.   Nose: Nose normal.   Mouth/Throat: Oropharynx is clear and moist.   Eyes: Conjunctivae and EOM are normal. Pupils are equal, round, and reactive to light. Scleral icterus is present.   Neck: Neck supple. No tracheal deviation present.   Normal range of motion.  Cardiovascular:  Regular rhythm, normal heart sounds and intact distal pulses.     Exam reveals no gallop and no friction rub.       No murmur heard.  Heart rate 103 blood pressure 159/83   Pulmonary/Chest: Breath sounds normal. No stridor. No respiratory distress. He has no wheezes. He has no rhonchi. He has no rales. He exhibits no tenderness.   Abdominal: Abdomen is soft. Bowel sounds are normal. He exhibits distension. He exhibits no mass. There is abdominal tenderness.   Generalized tenderness There is no rebound and no guarding.   Musculoskeletal:         General: No edema. Normal range of motion.      Cervical back: Normal range of motion and neck supple.     Neurological: He is alert and oriented to person, place, and time. He has normal strength. No cranial nerve deficit or sensory deficit.   Skin: Skin is warm and dry. No rash noted. No erythema. No pallor.   Psychiatric: He has a normal mood and affect. His behavior is normal. Judgment and thought content normal.       ED Course   Procedures  Labs Reviewed   CBC W/ AUTO DIFFERENTIAL - Abnormal; Notable for the following components:       Result Value    RBC 3.17 (*)      Hemoglobin 10.9 (*)     Hematocrit 32.1 (*)      (*)     MCH 34.4 (*)     RDW 15.0 (*)     Platelets 69 (*)     Immature Grans (Abs) 0.05 (*)     Gran % 76.4 (*)     Lymph % 14.4 (*)     All other components within normal limits    Narrative:     For upper or mid abdominal pain.   COMPREHENSIVE METABOLIC PANEL - Abnormal; Notable for the following components:    Glucose 113 (*)     BUN 23 (*)     Creatinine 2.0 (*)     Total Protein 8.6 (*)     Albumin 3.4 (*)     Total Bilirubin 3.5 (*)     Alkaline Phosphatase 141 (*)     AST 58 (*)     Anion Gap 7 (*)     eGFR 40.4 (*)     All other components within normal limits    Narrative:     For upper or mid abdominal pain.   URINALYSIS, REFLEX TO URINE CULTURE - Abnormal; Notable for the following components:    Occult Blood UA 1+ (*)     All other components within normal limits    Narrative:     In and Out Cath as needed it patient unable to void  Specimen Source->Urine   LIPASE - Abnormal; Notable for the following components:    Lipase 74 (*)     All other components within normal limits    Narrative:     For upper or mid abdominal pain.   URINALYSIS MICROSCOPIC - Abnormal; Notable for the following components:    RBC, UA 5 (*)     Hyaline Casts, UA 0.00 (*)     All other components within normal limits    Narrative:     In and Out Cath as needed it patient unable to void  Specimen Source->Urine   AMMONIA   MAGNESIUM   MAGNESIUM          Imaging Results              CT Abdomen Pelvis  Without Contrast (Final result)  Result time 05/13/23 04:11:48      Final result by Hossein Abrams MD (05/13/23 04:11:48)                   Narrative:    EXAM DESCRIPTION: CT ABDOMEN PELVIS WITHOUT CONTRAST 5/13/2023 4:09 AM CDT    CLINICAL HISTORY: 47 years, Male, Abdominal pain, acute, nonlocalized    COMPARISON: None.    PROCEDURE:  Multiple transaxial tomograms of the abdomen and pelvis were performed from the lung bases to the symphysis pubis utilizing 2 mm slice thickness  at 2 mm interval reconstruction, without administration of IV and oral contrast.  Multiplanar reformats in the sagittal and coronal plane were generated and reviewed.  This exam was performed according to our departmental dose-optimization protocol, which includes automated exposure control, adjustment of the mA and/or kV according to patient size and/or use of iterative reconstruction technique.    FINDINGS:  The lack of IV and oral contrast limits evaluation of solid organs, subtle lesions cannot be excluded.    The lung bases demonstrate very minimal dependent atelectatic changes. Mild cardiomegaly.    Grossly the unopacified liver demonstrate nodular surface increased size recanalized umbilical veins corresponding to changes of cirrhosis and portal hypertension.  The gallbladder, pancreas, spleen and adrenal glands demonstrate to be within normal limits, no significant focal lesions were identified.  The kidneys demonstrate grossly unremarkable.  There is no evidence for nephrolithiasis and/or hydronephrosis.  No focal masses were demonstrated.  Grossly the unopacified stomach, small bowel and large bowel demonstrate to be within normal limits. There is changes of right hemicolectomy. There is no evidence for bowel dilatation/or free air. Minimal diverticulosis within the left site colon. There is minimal haziness throughout the peritoneum suggesting early changes of edema/ascites.  The urinary bladder demonstrate to be within normal limits. The prostate gland demonstrate to be unremarkable. The aorta demonstrate very minimal atheromatous plaque at the aortic bifurcation.  There is no retroperitoneal lymphadenopathy.  There is no evidence for ascites. The rest of the soft tissue demonstrate to be grossly unremarkable. There are significant degenerative changes within the left hip joint corresponding to severe osteoarthritis.    IMPRESSION:  No evidence for nephrolithiasis and/or hydronephrosis.    Cirrhotic  liver with portal hypertension.    Minimal haziness throughout the peritoneum suggesting early changes of edema/ascites.    Status post right hemicolectomy.    Mild cardiomegaly.    Severe osteoarthritis left hip joint.    Electronically signed by:  Hossein Abrams MD  5/13/2023 4:11 AM CDT Workstation: [id number]                                     Medications   sodium chloride 0.9% bolus 1,000 mL 1,000 mL (1,000 mLs Intravenous New Bag 5/13/23 5092)   HYDROmorphone injection 0.5 mg (0.5 mg Intravenous Given 5/13/23 7165)     Medical Decision Making:   Clinical Tests:   Lab Tests: Ordered and Reviewed       <> Summary of Lab: Urine with 1+ blood otherwise normal  Magnesium at 2  H&H 10.9 and 32.1 platelets 32798  BUN 23 creatinine 2 total protein 8.6 albumin 3.4 bili 3.5 alk-phos 141 AST 58  Lipase 74  Radiological Study: Ordered and Reviewed  ED Management:  Emergent evaluation of a 47-year-old male with history of alcoholic cirrhosis, esophageal varices, alcoholic ketoacidosis, diverticulitis, GERD, acute tubular necrosis, CHF, hypertension, prior PE, and upper GI bleed who presents to the ER due to generalized abdominal pain and distension he reports pain is worse in the upper abdomen he reports it is 10/10 with nausea minimal emesis with no blood.  He reports that he was also having diarrhea but no blood in the stool.  No fevers chills or sweats.  No weakness or dizziness  He reports that he is concerned he may be developing a variceal bleed  On physical exam patient is laid back in bed.  Pulse 103 blood pressure 159/83 temp 99.1° sats 100% on room air respirations 20 patient has mild ascites tenderness throughout the abdomen worse in the epigastrium no rebound or guarding.  Mild scleral jaundice.  No asterixis.  No confusion.  Normal cardiac and lung exam  MDM    Patient presents for emergent evaluation of acute abdominal pain nausea vomiting that poses a threat to life and/or bodily function.   Differential  diagnosis includes but was not limited to GI bleeding from esophageal varices or ulcers, alcoholic ketoacidosis, alcoholic cirrhosis, liver failure, alcoholic encephalopathy, portal hypertension, portal vein thrombosis, acute pancreatitis, acute cholecystitis and cholangitis, colitis, acute appendicitis, urinary tract infection, ovarian or testicular torsion,epididymitis and orchitis, prostatitis, pyelonephritis, kidney stone, volvulus, small bowel obstruction, enteritis, gastritis, mesenteric ischemia, AAA, AAA rupture, aortic dissection    .   In the ED patient found to have acute upper abdominal pain with alcoholic cirrhosis, chronic kidney disease stage 3, chronic anemia and thrombocytopenia there is no acute change in his H&H no worsening of his renal insufficiency patient is actually had improvements in both.  He was transaminitis and hyperbilirubinemia but also improved from prior.  CT revealed early changes of edema and ascites cirrhotic liver with portal hypertension no signs of kidney stones no other acute abnormalities.  Patient be discharged home to follow up with his GI physician    I ordered labs and personally reviewed them.  Labs significant for see above    I ordered CT scan and personally reviewed it and reviewed the radiologist interpretation.  CT significant for No evidence for nephrolithiasis and/or hydronephrosis.     Cirrhotic liver with portal hypertension.     Minimal haziness throughout the peritoneum suggesting early changes of edema/ascites.     Status post right hemicolectomy.     Mild cardiomegaly.     Severe osteoarthritis left hip joint.      Discharge MDM     Patient was managed in the ED with IV 0.5 mg of Dilaudid, 1 L normal saline, 4 mg of Zofran    The response to treatment was good.    Patient was discharged in stable condition.  Detailed return precautions discussed.  Patient was told to follow up with primary care physician or specialist based on their diagnosis  Ines  Jose ALLAN                          Clinical Impression:   Final diagnoses:  [D64.9] Anemia, unspecified type  [D69.6] Thrombocytopenia  [N18.30] Stage 3 chronic kidney disease, unspecified whether stage 3a or 3b CKD (Chronic)  [K70.31] Ascites due to alcoholic cirrhosis  [N18.9, D63.1] Anemia in chronic kidney disease, unspecified CKD stage  [I10] Essential hypertension (Chronic)  [R74.01] Transaminitis  [K70.30] Alcoholic cirrhosis of liver without ascites (Primary) (Chronic)  [R10.10] Pain of upper abdomen        ED Disposition Condition    Discharge Stable          ED Prescriptions    None       Follow-up Information       Follow up With Specialties Details Why Contact Info Additional Information    formerly [facility] - Emergency Dept Emergency Medicine Go to  If symptoms worsen [address] [facility] [address] [phone] 1st floor    Edouard Gibson MD Family Medicine, Hospitalist Schedule an appointment as soon as possible for a visit in 3 days If your symptoms do not improve [address] [place] [place] [address]  [phone]                Ines Garcia MD  05/13/23 0437

## 2023-05-18 ENCOUNTER — HOSPITAL ENCOUNTER (EMERGENCY)
Facility: HOSPITAL | Age: 48
Discharge: HOME | End: 2023-05-18
Attending: EMERGENCY MEDICINE
Payer: MEDICARE

## 2023-05-18 VITALS
RESPIRATION RATE: 20 BRPM | BODY MASS INDEX: 27.77 KG/M2 | WEIGHT: 205 LBS | SYSTOLIC BLOOD PRESSURE: 191 MMHG | TEMPERATURE: 99 F | DIASTOLIC BLOOD PRESSURE: 96 MMHG | HEART RATE: 104 BPM | HEIGHT: 72 IN | OXYGEN SATURATION: 99 %

## 2023-05-18 DIAGNOSIS — R10.9 ABDOMINAL PAIN: ICD-10-CM

## 2023-05-18 LAB
ALBUMIN SERPL BCP-MCNC: 3.7 G/DL (ref 3.5–5.2)
ALP SERPL-CCNC: 149 U/L (ref 55–135)
ALT SERPL W/O P-5'-P-CCNC: 29 U/L (ref 10–44)
AMPHET+METHAMPHET UR QL: NEGATIVE
ANION GAP SERPL CALC-SCNC: 11 MMOL/L (ref 8–16)
AST SERPL-CCNC: 58 U/L (ref 10–40)
BACTERIA #/AREA URNS HPF: NEGATIVE /HPF
BARBITURATES UR QL SCN>200 NG/ML: NEGATIVE
BASOPHILS # BLD AUTO: 0.02 K/UL (ref 0–0.2)
BASOPHILS NFR BLD: 0.3 % (ref 0–1.9)
BENZODIAZ UR QL SCN>200 NG/ML: NEGATIVE
BILIRUB SERPL-MCNC: 5 MG/DL (ref 0.1–1)
BILIRUB UR QL STRIP: NEGATIVE
BUN SERPL-MCNC: 23 MG/DL (ref 6–20)
BZE UR QL SCN: NEGATIVE
CALCIUM SERPL-MCNC: 9.3 MG/DL (ref 8.7–10.5)
CANNABINOIDS UR QL SCN: NEGATIVE
CHLORIDE SERPL-SCNC: 102 MMOL/L (ref 95–110)
CLARITY UR: CLEAR
CO2 SERPL-SCNC: 22 MMOL/L (ref 23–29)
COLOR UR: YELLOW
CREAT SERPL-MCNC: 1.9 MG/DL (ref 0.5–1.4)
CREAT UR-MCNC: 191 MG/DL (ref 23–375)
DIFFERENTIAL METHOD: ABNORMAL
EOSINOPHIL # BLD AUTO: 0 K/UL (ref 0–0.5)
EOSINOPHIL NFR BLD: 0 % (ref 0–8)
ERYTHROCYTE [DISTWIDTH] IN BLOOD BY AUTOMATED COUNT: 15 % (ref 11.5–14.5)
EST. GFR  (NO RACE VARIABLE): 43 ML/MIN/1.73 M^2
ETHANOL SERPL-MCNC: <5 MG/DL
GLUCOSE SERPL-MCNC: 112 MG/DL (ref 70–110)
GLUCOSE UR QL STRIP: NEGATIVE
HCT VFR BLD AUTO: 35.4 % (ref 40–54)
HGB BLD-MCNC: 11.8 G/DL (ref 14–18)
HGB UR QL STRIP: ABNORMAL
HYALINE CASTS #/AREA URNS LPF: 4 /LPF
IMM GRANULOCYTES # BLD AUTO: 0.05 K/UL (ref 0–0.04)
IMM GRANULOCYTES NFR BLD AUTO: 0.6 % (ref 0–0.5)
KETONES UR QL STRIP: NEGATIVE
LEUKOCYTE ESTERASE UR QL STRIP: NEGATIVE
LIPASE SERPL-CCNC: 51 U/L (ref 4–60)
LYMPHOCYTES # BLD AUTO: 1.1 K/UL (ref 1–4.8)
LYMPHOCYTES NFR BLD: 13.9 % (ref 18–48)
MAGNESIUM SERPL-MCNC: 2.1 MG/DL (ref 1.6–2.6)
MCH RBC QN AUTO: 34.1 PG (ref 27–31)
MCHC RBC AUTO-ENTMCNC: 33.3 G/DL (ref 32–36)
MCV RBC AUTO: 102 FL (ref 82–98)
MICROSCOPIC COMMENT: ABNORMAL
MONOCYTES # BLD AUTO: 0.6 K/UL (ref 0.3–1)
MONOCYTES NFR BLD: 7.5 % (ref 4–15)
NEUTROPHILS # BLD AUTO: 6.1 K/UL (ref 1.8–7.7)
NEUTROPHILS NFR BLD: 77.7 % (ref 38–73)
NITRITE UR QL STRIP: NEGATIVE
NRBC BLD-RTO: 0 /100 WBC
OPIATES UR QL SCN: NEGATIVE
PCP UR QL SCN>25 NG/ML: NEGATIVE
PH UR STRIP: 6 [PH] (ref 5–8)
PLATELET # BLD AUTO: 110 K/UL (ref 150–450)
PLATELET BLD QL SMEAR: ABNORMAL
PMV BLD AUTO: 10.2 FL (ref 9.2–12.9)
POTASSIUM SERPL-SCNC: 4 MMOL/L (ref 3.5–5.1)
PROT SERPL-MCNC: 9.6 G/DL (ref 6–8.4)
PROT UR QL STRIP: ABNORMAL
RBC # BLD AUTO: 3.46 M/UL (ref 4.6–6.2)
RBC #/AREA URNS HPF: 11 /HPF (ref 0–4)
SODIUM SERPL-SCNC: 135 MMOL/L (ref 136–145)
SP GR UR STRIP: 1.01 (ref 1–1.03)
SQUAMOUS #/AREA URNS HPF: 1 /HPF
TOXICOLOGY INFORMATION: NORMAL
TROPONIN I SERPL HS-MCNC: 22 PG/ML (ref 0–14.9)
URN SPEC COLLECT METH UR: ABNORMAL
UROBILINOGEN UR STRIP-ACNC: NEGATIVE EU/DL
WBC # BLD AUTO: 7.85 K/UL (ref 3.9–12.7)
WBC #/AREA URNS HPF: 2 /HPF (ref 0–5)

## 2023-05-18 PROCEDURE — 96375 TX/PRO/DX INJ NEW DRUG ADDON: CPT

## 2023-05-18 PROCEDURE — 83735 ASSAY OF MAGNESIUM: CPT | Performed by: EMERGENCY MEDICINE

## 2023-05-18 PROCEDURE — 96374 THER/PROPH/DIAG INJ IV PUSH: CPT

## 2023-05-18 PROCEDURE — 93010 ELECTROCARDIOGRAM REPORT: CPT | Mod: ,,, | Performed by: INTERNAL MEDICINE

## 2023-05-18 PROCEDURE — 81001 URINALYSIS AUTO W/SCOPE: CPT | Performed by: EMERGENCY MEDICINE

## 2023-05-18 PROCEDURE — 85025 COMPLETE CBC W/AUTO DIFF WBC: CPT | Performed by: EMERGENCY MEDICINE

## 2023-05-18 PROCEDURE — 82077 ASSAY SPEC XCP UR&BREATH IA: CPT | Performed by: EMERGENCY MEDICINE

## 2023-05-18 PROCEDURE — 83690 ASSAY OF LIPASE: CPT | Performed by: EMERGENCY MEDICINE

## 2023-05-18 PROCEDURE — 25000003 PHARM REV CODE 250: Performed by: EMERGENCY MEDICINE

## 2023-05-18 PROCEDURE — 63600175 PHARM REV CODE 636 W HCPCS: Performed by: EMERGENCY MEDICINE

## 2023-05-18 PROCEDURE — 99284 EMERGENCY DEPT VISIT MOD MDM: CPT

## 2023-05-18 PROCEDURE — 93010 EKG 12-LEAD: ICD-10-PCS | Mod: ,,, | Performed by: INTERNAL MEDICINE

## 2023-05-18 PROCEDURE — 80053 COMPREHEN METABOLIC PANEL: CPT | Performed by: EMERGENCY MEDICINE

## 2023-05-18 PROCEDURE — 80307 DRUG TEST PRSMV CHEM ANLYZR: CPT | Performed by: EMERGENCY MEDICINE

## 2023-05-18 PROCEDURE — 93005 ELECTROCARDIOGRAM TRACING: CPT | Performed by: INTERNAL MEDICINE

## 2023-05-18 PROCEDURE — 84484 ASSAY OF TROPONIN QUANT: CPT | Performed by: EMERGENCY MEDICINE

## 2023-05-18 RX ORDER — LIDOCAINE HYDROCHLORIDE 20 MG/ML
15 SOLUTION OROPHARYNGEAL ONCE
Status: COMPLETED | OUTPATIENT
Start: 2023-05-18 | End: 2023-05-18

## 2023-05-18 RX ORDER — ONDANSETRON 2 MG/ML
4 INJECTION INTRAMUSCULAR; INTRAVENOUS
Status: COMPLETED | OUTPATIENT
Start: 2023-05-18 | End: 2023-05-18

## 2023-05-18 RX ORDER — MAG HYDROX/ALUMINUM HYD/SIMETH 200-200-20
30 SUSPENSION, ORAL (FINAL DOSE FORM) ORAL ONCE
Status: COMPLETED | OUTPATIENT
Start: 2023-05-18 | End: 2023-05-18

## 2023-05-18 RX ORDER — FAMOTIDINE 10 MG/ML
20 INJECTION INTRAVENOUS
Status: COMPLETED | OUTPATIENT
Start: 2023-05-18 | End: 2023-05-18

## 2023-05-18 RX ORDER — HYOSCYAMINE SULFATE 0.5 MG/ML
0.12 INJECTION, SOLUTION SUBCUTANEOUS
Status: COMPLETED | OUTPATIENT
Start: 2023-05-18 | End: 2023-05-18

## 2023-05-18 RX ADMIN — ALUMINUM HYDROXIDE, MAGNESIUM HYDROXIDE, AND SIMETHICONE 30 ML: 200; 200; 20 SUSPENSION ORAL at 04:05

## 2023-05-18 RX ADMIN — FAMOTIDINE 20 MG: 10 INJECTION INTRAVENOUS at 03:05

## 2023-05-18 RX ADMIN — ONDANSETRON 4 MG: 2 INJECTION INTRAMUSCULAR; INTRAVENOUS at 03:05

## 2023-05-18 RX ADMIN — LIDOCAINE HYDROCHLORIDE 15 ML: 20 SOLUTION ORAL; TOPICAL at 04:05

## 2023-05-18 RX ADMIN — HYOSCYAMINE SULFATE 0.12 MG: 0.5 INJECTION, SOLUTION SUBCUTANEOUS at 04:05

## 2023-05-18 NOTE — ED PROVIDER NOTES
Encounter Date: 5/18/2023       History     Chief Complaint   Patient presents with    Abdominal Pain     Abd pain starting this morning    Rectal Bleeding     Rectal bleeding starting 1 hour pta     Patient with a history of cirrhosis.  Patient reports no alcohol in about 2 weeks.  Patient started with epigastric abdominal pain today.  Patient reports when he got here he used toilet paper to life himself and noted his blood.  There was no bowel movement at time.  There was no blood mixed with stool in the last 2 weeks.  Patient was here couple weeks ago with reported gastrointestinal bleed.  There is no vomiting.  No hematemesis.    Review of patient's allergies indicates:   Allergen Reactions    Ciprofloxacin hcl Hallucinations    Meperidine Hives     Pt medicated w/multiple medications (demerol, protonix, and zofran)  w/i 10 mins time frame prior to developing localized hives near IV site that med was administered. Pt reports he has/takes all other medications on daily basis.     Morphine Itching    Nsaids (non-steroidal anti-inflammatory drug)      Kidney Disease    Tylenol [acetaminophen]      Hx of liver disease     Past Medical History:   Diagnosis Date    Alcohol withdrawal 5/1/2022    Alcoholic cirrhosis of liver     Alcoholic ketoacidosis 6/6/2021    Arthritis     ATN (acute tubular necrosis)     CHF (congestive heart failure)     Diverticulitis 01/2020    Esophageal varices     GERD (gastroesophageal reflux disease)     GI bleed     Hip arthritis     Left    Hypertension     Liver cirrhosis     Macrocytic anemia     Pulmonary embolism 08/2018    Unprovoked DVT.  Stop Coumadin due to GIB    Thrombocytopenia      Past Surgical History:   Procedure Laterality Date    ANKLE SURGERY      BLOCK, NERVE, PERIPHERAL Left 06/24/2022    Procedure: Left Hip femoral-obturator accessory nerve block;  Surgeon: Robbin De La Garza DO;  Location: [facility] OR;  Service: Pain Management;  Laterality: Left;    COLON SURGERY   2007    COLONOSCOPY  09/05/2019    [facility]    COLONOSCOPY N/A 02/17/2021    Procedure: COLONOSCOPY;  Surgeon: Renea Billingsley MD;  Location: [facility];  Service: Endoscopy;  Laterality: N/A;    COLONOSCOPY N/A 2/13/2023    Procedure: COLONOSCOPY;  Surgeon: Rudy Orellana MD;  Location: [facility] (4TH FLR);  Service: Endoscopy;  Laterality: N/A;  cirrhosis-labs done on 1/11/23  instr portal-GT  No answer for precall- [place]    COLONOSCOPY N/A 3/10/2023    Procedure: COLONOSCOPY;  Surgeon: Rudy Orellana MD;  Location: [facility] (4TH FLR);  Service: Endoscopy;  Laterality: N/A;  inst via poral per pt request    COLONOSCOPY W/ BIOPSIES  02/17/2021    ESOPHAGOGASTRODUODENOSCOPY N/A 07/26/2019    Procedure: EGD (ESOPHAGOGASTRODUODENOSCOPY);  Surgeon: Brian Trivedi MD;  Location: [facility];  Service: Endoscopy;  Laterality: N/A;    ESOPHAGOGASTRODUODENOSCOPY N/A 04/08/2020    Procedure: EGD (ESOPHAGOGASTRODUODENOSCOPY);  Surgeon: Donn Acuna MD;  Location: [facility];  Service: Endoscopy;  Laterality: N/A;    ESOPHAGOGASTRODUODENOSCOPY N/A 01/03/2021    Procedure: EGD (ESOPHAGOGASTRODUODENOSCOPY)- coffee ground emesis, hx varices;  Surgeon: Steve Chairez MD;  Location: [facility];  Service: Endoscopy;  Laterality: N/A;    ESOPHAGOGASTRODUODENOSCOPY N/A 05/03/2021    Procedure: EGD (ESOPHAGOGASTRODUODENOSCOPY);  Surgeon: Jeanmarie Ngo MD;  Location: [facility];  Service: Endoscopy;  Laterality: N/A;    ESOPHAGOGASTRODUODENOSCOPY N/A 07/19/2021    Procedure: ESOPHAGOGASTRODUODENOSCOPY (EGD);  Surgeon: Claudio Medeiros MD;  Location: [facility];  Service: Endoscopy;  Laterality: N/A;    ESOPHAGOGASTRODUODENOSCOPY  12/20/2021    ESOPHAGOGASTRODUODENOSCOPY N/A 12/20/2021    Procedure: EGD (ESOPHAGOGASTRODUODENOSCOPY);  Surgeon: Ajay Jackson MD;  Location: [facility];  Service: Endoscopy;  Laterality: N/A;    ESOPHAGOGASTRODUODENOSCOPY N/A 05/03/2022    Procedure: EGD (ESOPHAGOGASTRODUODENOSCOPY);  Surgeon: Brian PRIETO  MD Farooq;  Location: [facility];  Service: Endoscopy;  Laterality: N/A;    ESOPHAGOGASTRODUODENOSCOPY N/A 2022    Procedure: EGD (ESOPHAGOGASTRODUODENOSCOPY);  Surgeon: Ajay Jackson MD;  [facility];  Service: Endoscopy;  Laterality: N/A;    ESOPHAGOGASTRODUODENOSCOPY N/A 2022    Procedure: EGD (ESOPHAGOGASTRODUODENOSCOPY);  Surgeon: Edouard Maynard MD;  Location: [facility] (2ND FLR);  Service: Endoscopy;  Laterality: N/A;    ESOPHAGOGASTRODUODENOSCOPY N/A 2023    Procedure: EGD (ESOPHAGOGASTRODUODENOSCOPY);  Surgeon: Caesar Dickens MD;  Location: [facility];  Service: Endoscopy;  Laterality: N/A;    HERNIA REPAIR Left     Inguinal    UPPER GASTROINTESTINAL ENDOSCOPY N/A 2022     Family History   Problem Relation Age of Onset    Hypertension Mother     Breast cancer Mother     Hypertension Father     Prostate cancer Father     Bladder Cancer Father      Social History     Tobacco Use    Smoking status: Former     Types: Cigarettes     Quit date:      Years since quittin.3    Smokeless tobacco: Never    Tobacco comments:     quit 20 years ago   Substance Use Topics    Alcohol use: Not Currently     Comment: Quit drinking 21    Drug use: Not Currently     Review of Systems   Constitutional:  Negative for chills and fever.   HENT:  Negative for sore throat.    Eyes:  Negative for photophobia and visual disturbance.   Respiratory:  Negative for shortness of breath.    Cardiovascular:  Negative for chest pain.   Gastrointestinal:  Positive for abdominal pain and nausea. Negative for blood in stool and vomiting.   Genitourinary:  Negative for dysuria.   Musculoskeletal:  Negative for joint swelling.   Skin:  Negative for rash.   Neurological:  Negative for weakness and headaches.   Psychiatric/Behavioral:  Negative for confusion.      Physical Exam     Initial Vitals [23 1535]   BP Pulse Resp Temp SpO2   (!) 189/99 110 18 98.7 °F (37.1 °C) 100 %      MAP       --          Physical Exam    Nursing note and vitals reviewed.  Constitutional: He is not diaphoretic. No distress.   HENT:   Head: Normocephalic and atraumatic.   Eyes: Conjunctivae are normal.   Neck:   Normal range of motion.  Cardiovascular:  Regular rhythm.           Pulmonary/Chest: Breath sounds normal.   Abdominal: Abdomen is soft. There is no abdominal tenderness.   No abdominal distention.  Mild reproducible epigastric tenderness.  No guarding or rebound.   Musculoskeletal:         General: Normal range of motion.      Cervical back: Normal range of motion.     Neurological: He is alert and oriented to person, place, and time. He has normal strength. No cranial nerve deficit or sensory deficit.   Skin: No rash noted.   Psychiatric: He has a normal mood and affect.       ED Course   Procedures  Labs Reviewed   URINALYSIS, REFLEX TO URINE CULTURE - Abnormal; Notable for the following components:       Result Value    Protein, UA 1+ (*)     Occult Blood UA 2+ (*)     All other components within normal limits    Narrative:     Specimen Source->Urine   TROPONIN I HIGH SENSITIVITY - Abnormal; Notable for the following components:    Troponin I High Sensitivity 22.0 (*)     All other components within normal limits   COMPREHENSIVE METABOLIC PANEL - Abnormal; Notable for the following components:    Sodium 135 (*)     CO2 22 (*)     Glucose 112 (*)     BUN 23 (*)     Creatinine 1.9 (*)     Total Protein 9.6 (*)     Total Bilirubin 5.0 (*)     Alkaline Phosphatase 149 (*)     AST 58 (*)     eGFR 43.0 (*)     All other components within normal limits   CBC W/ AUTO DIFFERENTIAL - Abnormal; Notable for the following components:    RBC 3.46 (*)     Hemoglobin 11.8 (*)     Hematocrit 35.4 (*)      (*)     MCH 34.1 (*)     RDW 15.0 (*)     Platelets 110 (*)     Immature Granulocytes 0.6 (*)     Immature Grans (Abs) 0.05 (*)     Gran % 77.7 (*)     Lymph % 13.9 (*)     Platelet Estimate Decreased (*)     All other  components within normal limits   URINALYSIS MICROSCOPIC - Abnormal; Notable for the following components:    RBC, UA 11 (*)     Hyaline Casts, UA 4 (*)     All other components within normal limits    Narrative:     Specimen Source->Urine   MAGNESIUM   LIPASE   DRUG SCREEN PANEL, URINE EMERGENCY    Narrative:     Specimen Source->Urine   ALCOHOL,MEDICAL (ETHANOL)        ECG Results              EKG 12-lead (In process)  Result time 05/18/23 16:08:19      In process by Interface, Lab In [facility] (05/18/23 16:08:19)                   Narrative:    Test Reason : R10.9,    Vent. Rate : 108 BPM     Atrial Rate : 108 BPM     P-R Int : 156 ms          QRS Dur : 092 ms      QT Int : 362 ms       P-R-T Axes : 063 053 010 degrees     QTc Int : 485 ms    Sinus tachycardia  T wave abnormality, consider inferior ischemia  Abnormal ECG  When compared with ECG of 28-APR-2023 05:50,  No significant change was found    Referred By: [email or web address]   SELF           Confirmed By:                                   Imaging Results    None          Medications   ondansetron injection 4 mg (4 mg Intravenous Given 5/18/23 1558)   hyoscyamine injection 0.125 mg (0.125 mg Intravenous Given 5/18/23 1604)   famotidine (PF) injection 20 mg (20 mg Intravenous Given 5/18/23 1559)   aluminum-magnesium hydroxide-simethicone 200-200-20 mg/5 mL suspension 30 mL (30 mLs Oral Given 5/18/23 1633)     And   LIDOcaine HCl 2% oral solution 15 mL (15 mLs Oral Given 5/18/23 1633)     Medical Decision Making:   History:   Old Medical Records: I decided to obtain old medical records.  Old Records Summarized: records from clinic visits and records from previous admission(s).       <> Summary of Records: Recent CT the abdomen and pelvis reviewed.  Independently Interpreted Test(s):   I have ordered and independently interpreted EKG Reading(s) - see summary below       <> Summary of EKG Reading(s): Normal sinus rhythm with ventricular rate of 108.  Inverted T-waves  inferior leads.  EKG for April 28th reviewed.  Similar T-wave inversions.  Clinical Tests:   Lab Tests: Reviewed  Radiological Study: Reviewed  Medical Tests: Reviewed  ED Management:  Patient reports some blood on toilet paper with no bowel movement.  Patient likely with hemorrhoidal bleeding.  Hemoglobin is actually improving.  Patient does have epigastric pain.  Patient is somewhat improved after treatment here.  Given recent CT, no repeat advanced abdominal imaging warranted at this time.  Will refer to gastroenterology.  Liver function tests remain elevated especially total bilirubin of 5.0.  Patient already involved with liver transplant at [facility].                        Clinical Impression:   Final diagnoses:  [R10.9] Abdominal pain        ED Disposition Condition    Discharge Stable          ED Prescriptions    None       Follow-up Information       Follow up With Specialties Details Why Contact Info Additional Information    [facility] - Emergency Dept Emergency Medicine  If symptoms worsen [address] [facility] [address]  [phone] 1st floor             Edbill Bray MD  05/18/23 2165

## 2023-05-24 ENCOUNTER — TELEPHONE (OUTPATIENT)
Dept: PAIN MEDICINE | Facility: CLINIC | Age: 48
End: 2023-05-24
Payer: MEDICARE

## 2023-05-24 DIAGNOSIS — M87.052 AVASCULAR NECROSIS OF HIP, LEFT: Primary | ICD-10-CM

## 2023-05-24 RX ORDER — TRAMADOL HYDROCHLORIDE 50 MG/1
50 TABLET ORAL 3 TIMES DAILY PRN
Qty: 90 EACH | Refills: 0 | Status: SHIPPED | OUTPATIENT
Start: 2023-05-24 | End: 2023-06-23

## 2023-06-08 ENCOUNTER — INFUSION (OUTPATIENT)
Dept: INFECTIOUS DISEASES | Facility: HOSPITAL | Age: 48
End: 2023-06-08
Payer: MEDICARE

## 2023-06-08 VITALS
OXYGEN SATURATION: 99 % | HEART RATE: 100 BPM | TEMPERATURE: 98 F | SYSTOLIC BLOOD PRESSURE: 165 MMHG | WEIGHT: 208.69 LBS | RESPIRATION RATE: 18 BRPM | HEIGHT: 72 IN | DIASTOLIC BLOOD PRESSURE: 85 MMHG | BODY MASS INDEX: 28.27 KG/M2

## 2023-06-08 DIAGNOSIS — D63.1 ANEMIA IN CHRONIC KIDNEY DISEASE, UNSPECIFIED CKD STAGE: Primary | ICD-10-CM

## 2023-06-08 DIAGNOSIS — N18.9 ANEMIA IN CHRONIC KIDNEY DISEASE, UNSPECIFIED CKD STAGE: Primary | ICD-10-CM

## 2023-06-08 PROCEDURE — 63600175 PHARM REV CODE 636 W HCPCS: Mod: JZ,JG,TXP | Performed by: INTERNAL MEDICINE

## 2023-06-08 PROCEDURE — 96365 THER/PROPH/DIAG IV INF INIT: CPT | Mod: TXP

## 2023-06-08 PROCEDURE — 25000003 PHARM REV CODE 250: Mod: TXP | Performed by: INTERNAL MEDICINE

## 2023-06-08 RX ORDER — SODIUM CHLORIDE 0.9 % (FLUSH) 0.9 %
10 SYRINGE (ML) INJECTION
Status: DISCONTINUED | OUTPATIENT
Start: 2023-06-08 | End: 2023-06-08 | Stop reason: HOSPADM

## 2023-06-08 RX ORDER — DIPHENHYDRAMINE HYDROCHLORIDE 50 MG/ML
50 INJECTION INTRAMUSCULAR; INTRAVENOUS ONCE AS NEEDED
Status: CANCELLED | OUTPATIENT
Start: 2023-06-15

## 2023-06-08 RX ORDER — SODIUM CHLORIDE 9 MG/ML
INJECTION, SOLUTION INTRAVENOUS CONTINUOUS
Status: DISCONTINUED | OUTPATIENT
Start: 2023-06-08 | End: 2023-06-08 | Stop reason: HOSPADM

## 2023-06-08 RX ORDER — HEPARIN 100 UNIT/ML
5 SYRINGE INTRAVENOUS
Status: CANCELLED | OUTPATIENT
Start: 2023-06-15

## 2023-06-08 RX ORDER — SODIUM CHLORIDE 9 MG/ML
INJECTION, SOLUTION INTRAVENOUS CONTINUOUS
Status: CANCELLED | OUTPATIENT
Start: 2023-06-15

## 2023-06-08 RX ORDER — SODIUM CHLORIDE 0.9 % (FLUSH) 0.9 %
10 SYRINGE (ML) INJECTION
Status: CANCELLED | OUTPATIENT
Start: 2023-06-15

## 2023-06-08 RX ORDER — METHYLPREDNISOLONE SOD SUCC 125 MG
125 VIAL (EA) INJECTION ONCE AS NEEDED
Status: DISCONTINUED | OUTPATIENT
Start: 2023-06-08 | End: 2023-06-08 | Stop reason: HOSPADM

## 2023-06-08 RX ORDER — DIPHENHYDRAMINE HYDROCHLORIDE 50 MG/ML
50 INJECTION INTRAMUSCULAR; INTRAVENOUS ONCE AS NEEDED
Status: DISCONTINUED | OUTPATIENT
Start: 2023-06-08 | End: 2023-06-08 | Stop reason: HOSPADM

## 2023-06-08 RX ORDER — METHYLPREDNISOLONE SOD SUCC 125 MG
125 VIAL (EA) INJECTION ONCE AS NEEDED
Status: CANCELLED | OUTPATIENT
Start: 2023-06-15

## 2023-06-08 RX ORDER — EPINEPHRINE 0.3 MG/.3ML
0.3 INJECTION SUBCUTANEOUS ONCE AS NEEDED
Status: CANCELLED | OUTPATIENT
Start: 2023-06-15

## 2023-06-08 RX ORDER — EPINEPHRINE 0.3 MG/.3ML
0.3 INJECTION SUBCUTANEOUS ONCE AS NEEDED
Status: DISCONTINUED | OUTPATIENT
Start: 2023-06-08 | End: 2023-06-08 | Stop reason: HOSPADM

## 2023-06-08 RX ORDER — HEPARIN 100 UNIT/ML
5 SYRINGE INTRAVENOUS
Status: DISCONTINUED | OUTPATIENT
Start: 2023-06-08 | End: 2023-06-08 | Stop reason: HOSPADM

## 2023-06-08 RX ADMIN — FERRIC CARBOXYMALTOSE INJECTION 750 MG: 50 INJECTION, SOLUTION INTRAVENOUS at 11:06

## 2023-06-08 RX ADMIN — SODIUM CHLORIDE: 9 INJECTION, SOLUTION INTRAVENOUS at 11:06

## 2023-06-08 NOTE — PROGRESS NOTES
Pt arrived to infusion suite for 1/2 Injectafer over 30 mins., NS @ 25 cc/hr ran concurrently as flush bag.  Left unit in NAD.

## 2023-06-15 ENCOUNTER — INFUSION (OUTPATIENT)
Dept: INFECTIOUS DISEASES | Facility: HOSPITAL | Age: 48
End: 2023-06-15
Payer: MEDICARE

## 2023-06-15 VITALS
SYSTOLIC BLOOD PRESSURE: 160 MMHG | OXYGEN SATURATION: 98 % | HEART RATE: 80 BPM | TEMPERATURE: 99 F | BODY MASS INDEX: 28.04 KG/M2 | DIASTOLIC BLOOD PRESSURE: 74 MMHG | WEIGHT: 207 LBS | RESPIRATION RATE: 20 BRPM | HEIGHT: 72 IN

## 2023-06-15 DIAGNOSIS — N18.9 ANEMIA IN CHRONIC KIDNEY DISEASE, UNSPECIFIED CKD STAGE: Primary | ICD-10-CM

## 2023-06-15 DIAGNOSIS — D63.1 ANEMIA IN CHRONIC KIDNEY DISEASE, UNSPECIFIED CKD STAGE: Primary | ICD-10-CM

## 2023-06-15 PROCEDURE — 25000003 PHARM REV CODE 250: Mod: NTX | Performed by: INTERNAL MEDICINE

## 2023-06-15 PROCEDURE — 63600175 PHARM REV CODE 636 W HCPCS: Mod: JZ,JG,NTX | Performed by: INTERNAL MEDICINE

## 2023-06-15 PROCEDURE — 96366 THER/PROPH/DIAG IV INF ADDON: CPT | Mod: TXP

## 2023-06-15 PROCEDURE — 96365 THER/PROPH/DIAG IV INF INIT: CPT | Mod: TXP

## 2023-06-15 RX ORDER — SODIUM CHLORIDE 9 MG/ML
INJECTION, SOLUTION INTRAVENOUS CONTINUOUS
Status: CANCELLED | OUTPATIENT
Start: 2023-06-15

## 2023-06-15 RX ORDER — SODIUM CHLORIDE 0.9 % (FLUSH) 0.9 %
10 SYRINGE (ML) INJECTION
Status: DISCONTINUED | OUTPATIENT
Start: 2023-06-15 | End: 2023-06-15 | Stop reason: HOSPADM

## 2023-06-15 RX ORDER — EPINEPHRINE 0.3 MG/.3ML
0.3 INJECTION SUBCUTANEOUS ONCE AS NEEDED
Status: DISCONTINUED | OUTPATIENT
Start: 2023-06-15 | End: 2023-06-15 | Stop reason: HOSPADM

## 2023-06-15 RX ORDER — HEPARIN 100 UNIT/ML
5 SYRINGE INTRAVENOUS
Status: DISCONTINUED | OUTPATIENT
Start: 2023-06-15 | End: 2023-06-15 | Stop reason: HOSPADM

## 2023-06-15 RX ORDER — DIPHENHYDRAMINE HYDROCHLORIDE 50 MG/ML
50 INJECTION INTRAMUSCULAR; INTRAVENOUS ONCE AS NEEDED
OUTPATIENT
Start: 2023-06-15

## 2023-06-15 RX ORDER — HEPARIN 100 UNIT/ML
5 SYRINGE INTRAVENOUS
Status: CANCELLED | OUTPATIENT
Start: 2023-06-15

## 2023-06-15 RX ORDER — SODIUM CHLORIDE 0.9 % (FLUSH) 0.9 %
10 SYRINGE (ML) INJECTION
Status: CANCELLED | OUTPATIENT
Start: 2023-06-15

## 2023-06-15 RX ORDER — DIPHENHYDRAMINE HYDROCHLORIDE 50 MG/ML
50 INJECTION INTRAMUSCULAR; INTRAVENOUS ONCE AS NEEDED
Status: DISCONTINUED | OUTPATIENT
Start: 2023-06-15 | End: 2023-06-15 | Stop reason: HOSPADM

## 2023-06-15 RX ORDER — METHYLPREDNISOLONE SOD SUCC 125 MG
125 VIAL (EA) INJECTION ONCE AS NEEDED
OUTPATIENT
Start: 2023-06-15

## 2023-06-15 RX ORDER — EPINEPHRINE 0.3 MG/.3ML
0.3 INJECTION SUBCUTANEOUS ONCE AS NEEDED
OUTPATIENT
Start: 2023-06-15

## 2023-06-15 RX ORDER — SODIUM CHLORIDE 9 MG/ML
INJECTION, SOLUTION INTRAVENOUS CONTINUOUS
Status: DISCONTINUED | OUTPATIENT
Start: 2023-06-15 | End: 2023-06-15 | Stop reason: HOSPADM

## 2023-06-15 RX ORDER — METHYLPREDNISOLONE SOD SUCC 125 MG
125 VIAL (EA) INJECTION ONCE AS NEEDED
Status: DISCONTINUED | OUTPATIENT
Start: 2023-06-15 | End: 2023-06-15 | Stop reason: HOSPADM

## 2023-06-15 RX ADMIN — SODIUM CHLORIDE: 900 INJECTION INTRAVENOUS at 01:06

## 2023-06-15 RX ADMIN — FERRIC CARBOXYMALTOSE INJECTION 750 MG: 50 INJECTION, SOLUTION INTRAVENOUS at 02:06

## 2023-06-15 NOTE — PROGRESS NOTES
Pt arrived to infusion suite for 2/2 Injectafer over 30 mins., NS @ 25 cc/hr ran concurrently as flush bag.  Left unit in NAD.

## 2023-06-21 ENCOUNTER — OFFICE VISIT (OUTPATIENT)
Dept: PAIN MEDICINE | Facility: CLINIC | Age: 48
End: 2023-06-21
Payer: MEDICARE

## 2023-06-21 VITALS
WEIGHT: 207 LBS | SYSTOLIC BLOOD PRESSURE: 152 MMHG | DIASTOLIC BLOOD PRESSURE: 84 MMHG | HEIGHT: 72 IN | BODY MASS INDEX: 28.04 KG/M2 | HEART RATE: 87 BPM

## 2023-06-21 DIAGNOSIS — M25.551 RIGHT HIP PAIN: ICD-10-CM

## 2023-06-21 DIAGNOSIS — M87.052 AVASCULAR NECROSIS OF HIP, LEFT: Primary | ICD-10-CM

## 2023-06-21 DIAGNOSIS — G89.29 CHRONIC LEFT HIP PAIN: ICD-10-CM

## 2023-06-21 DIAGNOSIS — M25.552 CHRONIC LEFT HIP PAIN: ICD-10-CM

## 2023-06-21 PROCEDURE — 99213 OFFICE O/P EST LOW 20 MIN: CPT | Mod: PBBFAC | Performed by: STUDENT IN AN ORGANIZED HEALTH CARE EDUCATION/TRAINING PROGRAM

## 2023-06-21 PROCEDURE — 99214 PR OFFICE/OUTPT VISIT, EST, LEVL IV, 30-39 MIN: ICD-10-PCS | Mod: S$PBB,,, | Performed by: STUDENT IN AN ORGANIZED HEALTH CARE EDUCATION/TRAINING PROGRAM

## 2023-06-21 PROCEDURE — 99999 PR PBB SHADOW E&M-EST. PATIENT-LVL III: ICD-10-PCS | Mod: PBBFAC,,, | Performed by: STUDENT IN AN ORGANIZED HEALTH CARE EDUCATION/TRAINING PROGRAM

## 2023-06-21 PROCEDURE — 99999 PR PBB SHADOW E&M-EST. PATIENT-LVL III: CPT | Mod: PBBFAC,,, | Performed by: STUDENT IN AN ORGANIZED HEALTH CARE EDUCATION/TRAINING PROGRAM

## 2023-06-21 PROCEDURE — 99214 OFFICE O/P EST MOD 30 MIN: CPT | Mod: S$PBB,,, | Performed by: STUDENT IN AN ORGANIZED HEALTH CARE EDUCATION/TRAINING PROGRAM

## 2023-06-21 RX ORDER — TRAMADOL HYDROCHLORIDE 50 MG/1
50 TABLET ORAL 4 TIMES DAILY PRN
Qty: 120 TABLET | Refills: 2 | Status: SHIPPED | OUTPATIENT
Start: 2023-06-23 | End: 2023-09-13 | Stop reason: SDUPTHER

## 2023-06-21 NOTE — PROGRESS NOTES
Chronic Pain - f/u    Referring Physician: No ref. provider found    Date: 06/21/2023     Re: Irvin Diaz Jr.  MR#: [id number]  YOB: 1975  Age: 47 y.o.    Chief Complaint: hip pain  No chief complaint on file.    **This note is dictated using the M*Modal Fluency Direct word recognition program. There are word recognition mistakes that are occasionally missed on review.**    ASSESSMENT: 47 y.o. year old male with left hip pain, consistent with     1. Avascular necrosis of hip, left  traMADoL (ULTRAM) 50 mg tablet      2. Chronic left hip pain  traMADoL (ULTRAM) 50 mg tablet      3. Right hip pain  X-Ray Hip 2 or 3 views Right (with Pelvis when performed)          PLAN:     Avascular necrosis of the left hip   -s/p hip block without significant pain relief and complicated by Hematoma.   -Tramadol ineffective (discontinued)  -tolerating oxycodone 5 mg BID PRN (he thinks his lucid dreams were due to EtOH withdrawal). No more lucid dreams. Switch back to tramadol  -switched back to tramadol because he has been doing better and does not think that he needs the oxycodone anymore. Refill through September  -Increase Tramadol to QID.  - Medrol dose pack, helped while taking, but not sustained.  -not surgical candidate  -Not a candidate for further procedures due to bleeding risk  - Plts improved to ~110.  -buprenorphine likely not an option 2/2 to his ESLD and liver transplant status    Right hip pain  -XR Right hip given hx of avascular necrosis in the left hip    Allodynia  -stopped Nortriptyline to 50 mg nightly. Helps him sleep, but made him nauseous    Thorombocytopenia 2/2 cirrhosis  -platelets are 92 on 5/26/22, 82 on 9/7/22, 74 on 10/25/22, 110 on 5/18/23  -Avanos recommended above 50, but he had hematoma, so will not proceed.    Chronic Opioid Use  - UDS from October and September did not show opioids but he states he was decreasing use at that time.  -no UDS todaym, but will get at next  appointment    Cirrhosis 2/2 past EtOH. He states he is not drinking anymore.  -following with hepatology  -possible liver transplant?  -working with psychiatry for EtOH.    Kidney disease  -GFR 54    - RTC before 9/21/23  - Counseled patient regarding the importance of  activity modification.    The above plan and management options were discussed at length with patient. Patient is in agreement with the above and verbalized understanding. It will be communicated with the referring physician via electronic record, fax, or mail.  Lab/study reports reviewed were important and necessary because subsequent medical and treatment recommendations required review of the above lab/study reports. Images viewed/reviewed above were important and necessary because subsequent medical and treatment recommendations required review of the reviewed image(s).     Electronically signed by:  Robbin De La Garza DO  06/21/2023    =========================================================================================================    SUBJECTIVE:      Interval History 6/21/2023:   Irvin Diaz David is a 47 y.o. male presents to the clinic for follow up.  Since last visit the pain has has improved in some ways and worsened in others. The right hip has been bothering him more now. States that his right leg is clicking.     The pain is located in the left hip area and radiates to the right hip and down to his left knee .  The pain is described as aching and throbbing    At BEST  7/10   At WORST  9/10 on the WORST day.    On average pain is rated as 8/10.   Today the pain is rated as 8/10  Symptoms interfere with daily activity.   Exacerbating factors: Bending.    Mitigating factors medications (tramadol not oxycodone).     Current pain medications: Tramadol 50mg TID    Failed Pain Medications: oxycodone, tramadol, gabapentin, cannot take NSAIDs due to gastric bleeding, cannot take tylenol due to liver failure.     Pain  procedures:  6/24/22 - left hip block - no relief. C/b hematoma formation    Interval History 1/25/2023:     Irvin Diaz Jr. is a 47 y.o. male presents to the clinic for follow up.  Since last visit the pain has has moderately improved. Reports that he went on a cruise and noticed his pain was doing better.  He has stopped using the cane because it has been better.  He went back to tramadol because he did not think he required the oxycodone. Pain 8/10 today. Feels like a tolerable 8.    Interval History 10/31/2022:     Irvin Diaz Jr. is a 47 y.o. male presents to the clinic for follow up.  Since last visit the pain has has significantly worsened.    The pain is located in the L hip area and radiates to the L foot .  The pain is described as aching, dull, shooting, stabbing, tight band, and tingling    At BEST  8/10   At WORST  10/10 on the WORST day.    On average pain is rated as 7/10.   Today the pain is rated as 8/10  Symptoms interfere with daily activity, sleeping, and work.   Exacerbating factors: Standing, Bending, Coughing/Sneezing, Walking, and Getting out of bed/chair.    Mitigating factors nothing and medications.     Interval History 9/7/2022:     Irvin Diaz Jr. is a 47 y.o. male presents to the clinic for follow up.  Since last visit the pain has has worsened. He is now using a walker full time for the last 2 weeks. In a wheelchair today. He is unable to describe if his limited mobility is due to just pain or maybe a component of weakness.     The pain is located in the L hip area and radiates to the L foot .  The pain is described as aching, shooting, and stabbing    At BEST  7/10   At WORST  10/10 on the WORST day.    On average pain is rated as 8/10.   Today the pain is rated as 10/10  Symptoms interfere with daily activity, sleeping, and work.   Exacerbating factors: Standing, Laying, Bending, Walking, Lifting, and Getting out of bed/chair.    Mitigating factors none.      Interval History 8/2/2022:     Irvin Diaz Jr. is a 47 y.o. male presents to the clinic for follow up.  Since last visit the pain has is unchanged.  He stopped the Oxycodone because it gave severe, lucid dreams.  Ran out of tramadol and has not been taking anything for his pain.    The pain is located in the left hip area and radiates to the left knee/ groin .  The pain is described as aching, shooting and stabbing    At BEST  8/10   At WORST  10/10 on the WORST day.    On average pain is rated as 8/10.   Today the pain is rated as 8/10  Symptoms interfere with daily activity, sleeping and work.   Exacerbating factors: Standing, Walking and Getting out of bed/chair.    Mitigating factors nothing, heat, ice, medications and rest.     Interval History 7/5/2022:     Irvin Diaz Jr. is a 47 y.o. male presents to the clinic for follow up.  Since last visit the pain has is unchanged.  He is s/p hip block that was complicated by hematoma in the pectineus muscle.  His Hgb has dropped, although the hematoma is resolving.  Recommended that patient go to ED as recommended by transplant.    The pain is located in the left hip area and radiates to the left leg/ groin  .  The pain is described as aching, shooting and stabbing    At BEST  8/10   At WORST  10/10 on the WORST day.    On average pain is rated as 8/10.   Today the pain is rated as 8/10  Symptoms interfere with daily activity, sleeping and work.   Exacerbating factors: Standing, Walking and Getting out of bed/chair.    Mitigating factors nothing, heat, ice, medications and rest.     Interval History 5/30/2022:     Irvin Diaz Jr. is a 47 y.o. male presents to the clinic for follow up.  Since last visit the pain has is unchanged. He missed his original procedure date due to not checking his voicemail and transportation issues. He is still interested in the procedure.  WE had a long talk about bleeding risk with his low platelets and high  INR.    The pain is located in the left hip area and radiates to the left leg/groin .  The pain is described as aching, shooting and stabbing    At BEST  8/10   At WORST  10/10 on the WORST day.    On average pain is rated as 8/10.   Today the pain is rated as 8/10  Symptoms interfere with daily activity, sleeping and work.   Exacerbating factors: walking.    Mitigating factors nothing.     Initial Hx:  Irvin Diaz Jr. is a 47 y.o. male presents to the clinic for the evaluation of left hip pain. The pain started 3 years ago following fall and symptoms have been worsening. The patient states used to get hip injections of the left hip.  He is unable to walk without a cane.  He states that he was told after imaging that he had a fracture in 2019.  He had an injection (and aspiration) in December/january and it helps the pain for about 3 weeks.  After the injections he still needs the pain but it helps.  The ortho physician at [facility]    CT note about the hip:  There is collapse of the left femoral head superior portion with bone-on-bone as well as underlying femoral sclerotic changes suggesting AVN with severe secondary degenerative changes of the acetabulum and the left hip effusion stable since prior exam.     The patient says that he has seen a ortho surgeon in the past and that they are unable to do surgery due to his liver failure.    Pain Description:    The pain is located in the left hip area and radiates to the left leg/ groin area .    At BEST  8/10   At WORST  10/10 on the WORST day.    On average pain is rated as 8/10.   Today the pain is rated as 8/10  The pain is continuous.  The pain is described as aching, shooting and throbbing    Symptoms interfere with daily activity, sleeping and work.   Exacerbating factors: Standing, Laying, Bending, Walking, Night Time, Morning, Flexing, Lifting and Getting out of bed/chair.    Mitigating factors laying down and medications.   He reports  5 hours of sleep per night.    Physical Therapy/Home Exercise: No, not currently in physical therapy or home exercise program    Current Pain Medications:    - none    Failed Pain Medications:    - cannot take NSAIDs due to gastric bleeding, cannot take tylenol due to liver failure.     Pain Treatment Therapies:    Pain procedures: hip injections  Physical Therapy: physical therapy made things worse  Chiropractor: none  Acupuncture: none  TENS unit: none  Spinal decompression: none  Joint replacement: none    Patient denies urinary incontinence, bowel incontinence and loss of sensations. (+) weakness in the left leg  Patient denies any suicidal or homicidal ideations     report:  Reviewed and consistent with medication use as prescribed.    Imaging:   XR abdomen 03/2022:  Please note the hemidiaphragms are not included in their entirety.  Multiple surgical clips and presumed anastomotic suture line project over the right abdomen.  There are a few mildly prominent loops of gas-filled small bowel loops present measuring up to 3.3 cm in the left abdomen.  Limited evaluation of free intraperitoneal air to patient positioning/technique.  Calcifications project over the pelvis, likely phleboliths.  Osseous structures demonstrate significant degenerative change of the left hip with chronic appearing deformity/collapse of the left femoral head.    CT abdomen 03/2022:  Mild circumferential wall thickening involving the proximal colon extending from the ileocolic anastomosis at the hepatic flexure through around the level of the splenic flexure suggests inflammatory or infectious colitis.  No convincing transmural inflammation is seen.     Postoperative changes of proximal colectomy and scattered mild diverticulosis of the more distal colon.  No convincing diverticulitis, bowel obstruction, free air or abscess.     Findings of cirrhosis and mild abdominal ascites as described essentially stable.     Left hip findings  suggesting AVN as described.     This report was flagged in Epic as abnormal.     No other significant or acute findings.       Past Medical History:   Diagnosis Date    Alcohol withdrawal 5/1/2022    Alcoholic cirrhosis of liver     Alcoholic ketoacidosis 6/6/2021    Arthritis     ATN (acute tubular necrosis)     CHF (congestive heart failure)     Diverticulitis 01/2020    Esophageal varices     GERD (gastroesophageal reflux disease)     GI bleed     Hip arthritis     Left    Hypertension     Liver cirrhosis     Macrocytic anemia     Pulmonary embolism 08/2018    Unprovoked DVT.  Stop Coumadin due to GIB    Thrombocytopenia      Past Surgical History:   Procedure Laterality Date    ANKLE SURGERY      BLOCK, NERVE, PERIPHERAL Left 06/24/2022    Procedure: Left Hip femoral-obturator accessory nerve block;  Surgeon: Robbin De La Garza DO;  Location: [facility];  Service: Pain Management;  Laterality: Left;    COLON SURGERY  2007    COLONOSCOPY  09/05/2019    [facility]    COLONOSCOPY N/A 02/17/2021    Procedure: COLONOSCOPY;  Surgeon: Renea Billingsley MD;  Location: [facility];  Service: Endoscopy;  Laterality: N/A;    COLONOSCOPY N/A 2/13/2023    Procedure: COLONOSCOPY;  Surgeon: Rudy Orellana MD;  Location: [facility] ([address] [place] [address]);  Service: Endoscopy;  Laterality: N/A;  cirrhosis-labs done on 1/11/23  instr portal-GT  No answer for precall- [place]    COLONOSCOPY N/A 3/10/2023    Procedure: COLONOSCOPY;  Surgeon: Rudy Orellana MD;  [facility]  Service: Endoscopy;  Laterality: N/A;  inst via poral per pt request    COLONOSCOPY W/ BIOPSIES  02/17/2021    ESOPHAGOGASTRODUODENOSCOPY N/A 07/26/2019    Procedure: EGD (ESOPHAGOGASTRODUODENOSCOPY);  Surgeon: Brian Trivedi MD;  Location: [facility];  Service: Endoscopy;  Laterality: N/A;    ESOPHAGOGASTRODUODENOSCOPY N/A 04/08/2020    Procedure: EGD (ESOPHAGOGASTRODUODENOSCOPY);  Surgeon: Donn Acuna MD;  Location: [facility];  Service: Endoscopy;  Laterality: N/A;     ESOPHAGOGASTRODUODENOSCOPY N/A 2021    Procedure: EGD (ESOPHAGOGASTRODUODENOSCOPY)- coffee ground emesis, hx varices;  Surgeon: Steve Chairez MD;  Location: [facility];  Service: Endoscopy;  Laterality: N/A;    ESOPHAGOGASTRODUODENOSCOPY N/A 2021    Procedure: EGD (ESOPHAGOGASTRODUODENOSCOPY);  Surgeon: Jeanmarie Ngo MD;  Location: [facility];  Service: Endoscopy;  Laterality: N/A;    ESOPHAGOGASTRODUODENOSCOPY N/A 2021    Procedure: ESOPHAGOGASTRODUODENOSCOPY (EGD);  Surgeon: Claudio Medeiros MD;  Location: [facility];  Service: Endoscopy;  Laterality: N/A;    ESOPHAGOGASTRODUODENOSCOPY  2021    ESOPHAGOGASTRODUODENOSCOPY N/A 2021    Procedure: EGD (ESOPHAGOGASTRODUODENOSCOPY);  Surgeon: Ajay Jackson MD;  Location: [facility];  Service: Endoscopy;  Laterality: N/A;    ESOPHAGOGASTRODUODENOSCOPY N/A 2022    Procedure: EGD (ESOPHAGOGASTRODUODENOSCOPY);  Surgeon: Brian Trivedi MD;  Location: [facility];  Service: Endoscopy;  Laterality: N/A;    ESOPHAGOGASTRODUODENOSCOPY N/A 2022    Procedure: EGD (ESOPHAGOGASTRODUODENOSCOPY);  Surgeon: Ajay Jackson MD;  Location: [facility];  Service: Endoscopy;  Laterality: N/A;    ESOPHAGOGASTRODUODENOSCOPY N/A 2022    Procedure: EGD (ESOPHAGOGASTRODUODENOSCOPY);  Surgeon: Edouard Maynard MD;  Location: [facility] ([address] [place] [address]);  Service: Endoscopy;  Laterality: N/A;    ESOPHAGOGASTRODUODENOSCOPY N/A 2023    Procedure: EGD (ESOPHAGOGASTRODUODENOSCOPY);  Surgeon: Caesar Dickens MD;  Location: [facility];  Service: Endoscopy;  Laterality: N/A;    HERNIA REPAIR Left     Inguinal    UPPER GASTROINTESTINAL ENDOSCOPY N/A 2022     Social History     Socioeconomic History    Marital status:    Tobacco Use    Smoking status: Former     Types: Cigarettes     Quit date: 2000     Years since quittin.4    Smokeless tobacco: Never    Tobacco comments:     quit 20 years ago   Substance and Sexual Activity     Alcohol use: Not Currently     Comment: Quit drinking 9/12/22    Drug use: Not Currently    Sexual activity: Yes     Comment: occ     Social Determinants of Health     Financial Resource Strain: Low Risk     Difficulty of Paying Living Expenses: Not very hard   Food Insecurity: No Food Insecurity    Worried About Running Out of Food in the Last Year: Never true    Ran Out of Food in the Last Year: Never true   Transportation Needs: No Transportation Needs    Lack of Transportation (Medical): No    Lack of Transportation (Non-Medical): No   Physical Activity: Inactive    Days of Exercise per Week: 0 days    Minutes of Exercise per Session: 0 min   Stress: No Stress Concern Present    Feeling of Stress : Not at all   Social Connections: Moderately Integrated    Frequency of Communication with Friends and Family: More than three times a week    Frequency of Social Gatherings with Friends and Family: More than three times a week    Attends [employer] Services: Never    Active Member of Clubs or Organizations: Yes    Attends Club or Organization Meetings: Never    Marital Status:    Housing Stability: Low Risk     Unable to Pay for Housing in the Last Year: No    Number of Places Lived in the Last Year: 1    Unstable Housing in the Last Year: No     Family History   Problem Relation Age of Onset    Hypertension Mother     Breast cancer Mother     Hypertension Father     Prostate cancer Father     Bladder Cancer Father        Review of patient's allergies indicates:   Allergen Reactions    Ciprofloxacin hcl Hallucinations    Meperidine Hives     Pt medicated w/multiple medications (demerol, protonix, and zofran)  w/i 10 mins time frame prior to developing localized hives near IV site that med was administered. Pt reports he has/takes all other medications on daily basis.     Morphine Itching    Nsaids (non-steroidal anti-inflammatory drug)      Kidney Disease    Tylenol [acetaminophen]      Hx of liver disease        Current Outpatient Medications   Medication Sig    acamprosate (CAMPRAL) 333 mg tablet Take 2 tablets (666 mg total) by mouth 3 (three) times daily.    amLODIPine (NORVASC) 10 MG tablet Take 1 tablet (10 mg total) by mouth once daily.    augmented betamethasone dipropionate (DIPROLENE-AF) 0.05 % cream Apply topically 2 (two) times daily. (Patient taking differently: Apply 1 application topically 2 (two) times daily.)    calcitRIOL (ROCALTROL) 0.25 MCG Cap Take 1 capsule (0.25 mcg total) by mouth once daily.    carvediloL (COREG) 12.5 MG tablet Take 1 tablet (12.5 mg total) by mouth 2 (two) times daily with meals.    clindamycin-benzoyl peroxide (BENZACLIN) gel Apply topically 2 (two) times daily. (Patient taking differently: Apply 1 application topically 2 (two) times daily.)    ferrous gluconate (FERGON) 240 (27 FE) MG tablet Take 2 tablets (480 mg total) by mouth 2 (two) times daily with meals.    folic acid (FOLVITE) 1 MG tablet Take 1 tablet (1 mg total) by mouth once daily.    furosemide (LASIX) 20 MG tablet Take 1 tablet (20 mg total) by mouth once daily.    lactulose (CHRONULAC) 10 gram/15 mL solution Take 30 mLs (20 g total) by mouth 3 (three) times daily. Take enough to have 2 to 3 bowel movements a day to prevent hepatic encephalopathy.    nortriptyline (PAMELOR) 50 MG capsule Take 1 capsule (50 mg total) by mouth nightly as needed (pain and insomnia).    pantoprazole (PROTONIX) 40 MG tablet Take 2 tablets (80 mg total) by mouth 2 (two) times daily.    rifAXIMin (XIFAXAN) 550 mg Tab Take 1 tablet (550 mg total) by mouth 2 (two) times daily.    sildenafiL (VIAGRA) 100 MG tablet Take 1 tablet (100 mg total) by mouth daily as needed for Erectile Dysfunction.    spironolactone (ALDACTONE) 50 MG tablet Take 1 tablet (50 mg total) by mouth once daily.    thiamine 100 MG tablet Take 1 tablet (100 mg total) by mouth once daily.    traMADoL (ULTRAM) 50 mg tablet Take 1 tablet (50 mg total) by mouth 3 (three)  times daily as needed for Pain.    [START ON 6/23/2023] traMADoL (ULTRAM) 50 mg tablet Take 1 tablet (50 mg total) by mouth 4 (four) times daily as needed for Pain.     No current facility-administered medications for this visit.       REVIEW OF SYSTEMS:    GENERAL:  No weight loss, malaise or fevers.   HEENT:   No recent changes in vision or hearing   NECK:  Negative for lumps, no difficulty with swallowing.  RESPIRATORY:  Negative for cough, wheezing or shortness of breath, patient denies any recent URI.  CARDIOVASCULAR:  Negative for chest pain, leg swelling or palpitations.  GI:  Negative for abdominal discomfort, blood in stools or black stools or change in bowel habits.  MUSCULOSKELETAL:  See HPI.  SKIN:  Negative for lesions, rash, and itching. + skin itching  PSYCH:  No mood disorder or recent psychosocial stressors.  Patients sleep is not disturbed secondary to pain.  HEMATOLOGY/LYMPHOLOGY:  Negative for prolonged bleeding, bruising easily or swollen nodes.  Patient is not currently taking any anti-coagulants  NEURO:   No history of headaches, syncope, paralysis, seizures or tremors.  All other reviewed and negative other than HPI.    OBJECTIVE:    BP (!) 152/84 (BP Location: Left arm, Patient Position: Sitting)   Pulse 87   Ht 6' (1.829 m)   Wt 93.9 kg (207 lb 0.2 oz)   BMI 28.08 kg/m²     PHYSICAL EXAMINATION:    GENERAL: Fraile, in no acute distress, alert and oriented x3.  PSYCH:  Mood and affect appropriate.  SKIN: Skin color, texture, turgor normal, no rashes or lesions on visible skin.  HEAD/FACE:  Normocephalic, atraumatic. Cranial nerves grossly intact.  CV: RRR with palpation of the radial artery.  PULM: CTAB. No evidence of respiratory difficulty, symmetric chest rise.  GI:  Soft    MUSKULOSKELETAL:    EXTREMITIES:   Left Hip Exam (limited ROM and exam 2/2/ pain)  - Log Roll Positive  - FADIR Positive  - Stinchfield Unable to Perform  - Hip Scour Unable to Perform  - GTB Tenderness Positive    -TTP over anterior hip joint. Posterior not palpated  - TTP of the superior knee joint.    Right hip exam:  (+) log roll  (+) fadir  (+) TTP of the right groin    MUSCULOSKELETAL:  Atrophy of the left leg compared to the right  No deformities, edema, or skin discoloration are noted on visible skin. Good capillary refill.     NEURO: Bilateral upper and lower extremity coordination and muscle stretch reflexes are physiologic and symmetric.      NEUROLOGICAL EXAM:  MENTAL STATUS: A x O x 3, good concentration, speech is fluent and goal directed  MEMORY: recent and remote are intact  CN: CN2-12 grossly intact  MOTOR: 5/5 in all muscle groups on the right. HF 3/5 on the left, 4/5 KE, 4/5 KF with pain  DTRs: 3+ intact symmetric patella and 2 + achilles  Sensation:    -yes Loss of sensation in a left lower L-1 and L-2 on the left distribution.  Babinski: absent     Gait: Cane, abnormal. Short stride. Favors left leg

## 2023-06-22 ENCOUNTER — HOSPITAL ENCOUNTER (OUTPATIENT)
Dept: RADIOLOGY | Facility: CLINIC | Age: 48
Discharge: HOME | End: 2023-06-22
Attending: STUDENT IN AN ORGANIZED HEALTH CARE EDUCATION/TRAINING PROGRAM
Payer: MEDICARE

## 2023-06-22 DIAGNOSIS — M25.551 RIGHT HIP PAIN: ICD-10-CM

## 2023-06-22 PROCEDURE — 73502 XR HIP WITH PELVIS WHEN PERFORMED, 2 OR 3  VIEWS RIGHT: ICD-10-PCS | Mod: 26,RT,S$GLB,TXP | Performed by: RADIOLOGY

## 2023-06-22 PROCEDURE — 73502 X-RAY EXAM HIP UNI 2-3 VIEWS: CPT | Mod: 26,RT,S$GLB,TXP | Performed by: RADIOLOGY

## 2023-06-22 PROCEDURE — 73502 X-RAY EXAM HIP UNI 2-3 VIEWS: CPT | Mod: TC,FY,PO,RT,NTX

## 2023-07-31 PROBLEM — K92.0 GASTROINTESTINAL HEMORRHAGE WITH HEMATEMESIS: Status: RESOLVED | Noted: 2023-04-28 | Resolved: 2023-07-31

## 2023-08-22 ENCOUNTER — TELEPHONE (OUTPATIENT)
Dept: TRANSPLANT | Facility: CLINIC | Age: 48
End: 2023-08-22
Payer: MEDICARE

## 2023-08-22 NOTE — TELEPHONE ENCOUNTER
Spoke to pt regarding missing many appointments to complete transplant evaluation since March of this year. Pt states he travels and not home much to attend appointments. Informed pt we will need to close his transplant episode if he can not commit to attending appointments and follow up care as needed. Pt states he wants a transplant. He asked to be scheduled in October. Asked pt could we schedule him sooner if appt available pt states, he's going on vacation and then he will be in [place] for 3 weeks. Informed pt this is the same information he gave in March as to why he couldn't come in. Informed pt I will update Dr. Arreaga and will let him know if we will keep his transplant episode open. Understanding stated.

## 2023-09-13 ENCOUNTER — OFFICE VISIT (OUTPATIENT)
Dept: PAIN MEDICINE | Facility: CLINIC | Age: 48
End: 2023-09-13
Payer: MEDICARE

## 2023-09-13 ENCOUNTER — TELEPHONE (OUTPATIENT)
Dept: PAIN MEDICINE | Facility: CLINIC | Age: 48
End: 2023-09-13
Payer: MEDICARE

## 2023-09-13 VITALS
HEIGHT: 72 IN | HEART RATE: 81 BPM | DIASTOLIC BLOOD PRESSURE: 85 MMHG | BODY MASS INDEX: 28.04 KG/M2 | WEIGHT: 207 LBS | SYSTOLIC BLOOD PRESSURE: 156 MMHG

## 2023-09-13 DIAGNOSIS — G89.29 CHRONIC LEFT HIP PAIN: ICD-10-CM

## 2023-09-13 DIAGNOSIS — M16.11 PRIMARY OSTEOARTHRITIS OF RIGHT HIP: Primary | ICD-10-CM

## 2023-09-13 DIAGNOSIS — M87.052 AVASCULAR NECROSIS OF HIP, LEFT: ICD-10-CM

## 2023-09-13 DIAGNOSIS — M25.552 CHRONIC LEFT HIP PAIN: ICD-10-CM

## 2023-09-13 DIAGNOSIS — G89.4 CHRONIC PAIN SYNDROME: ICD-10-CM

## 2023-09-13 DIAGNOSIS — M16.11 OSTEOARTHRITIS OF RIGHT HIP: Primary | ICD-10-CM

## 2023-09-13 PROCEDURE — 99214 PR OFFICE/OUTPT VISIT, EST, LEVL IV, 30-39 MIN: ICD-10-PCS | Mod: S$PBB,,, | Performed by: STUDENT IN AN ORGANIZED HEALTH CARE EDUCATION/TRAINING PROGRAM

## 2023-09-13 PROCEDURE — 99214 OFFICE O/P EST MOD 30 MIN: CPT | Mod: S$PBB,,, | Performed by: STUDENT IN AN ORGANIZED HEALTH CARE EDUCATION/TRAINING PROGRAM

## 2023-09-13 PROCEDURE — 99213 OFFICE O/P EST LOW 20 MIN: CPT | Mod: PBBFAC | Performed by: STUDENT IN AN ORGANIZED HEALTH CARE EDUCATION/TRAINING PROGRAM

## 2023-09-13 PROCEDURE — 99999 PR PBB SHADOW E&M-EST. PATIENT-LVL III: CPT | Mod: PBBFAC,,, | Performed by: STUDENT IN AN ORGANIZED HEALTH CARE EDUCATION/TRAINING PROGRAM

## 2023-09-13 PROCEDURE — 99999 PR PBB SHADOW E&M-EST. PATIENT-LVL III: ICD-10-PCS | Mod: PBBFAC,,, | Performed by: STUDENT IN AN ORGANIZED HEALTH CARE EDUCATION/TRAINING PROGRAM

## 2023-09-13 RX ORDER — TRAMADOL HYDROCHLORIDE 50 MG/1
50 TABLET ORAL 4 TIMES DAILY PRN
Qty: 120 TABLET | Refills: 3 | Status: SHIPPED | OUTPATIENT
Start: 2023-09-16 | End: 2023-12-11 | Stop reason: SDUPTHER

## 2023-09-13 NOTE — TELEPHONE ENCOUNTER
----- Message from Robbin Villarreal DO sent at 2023 10:32 AM CDT -----  Regarding: Order for MARIA ISABEL LUX JR.    Patient Name: MARIA ISABEL LUX JR.([id number])  Sex: Male  : 1975      PCP: ELENI ESTRADA    Center: [facility] [place]     Types of orders made on 2023: Procedure Request    Order Date:2023  Ordering User:ROBBIN VILLARREAL [[id number]]  Encounter Provider:Robbin Villarreal DO [[id number]]  Z1   Authorizing Provider: Robbin Villarreal DO [[id number]]  Department:[facility] PAIN [facility]NAGEMENT[[id number]]    Common Order Information  Procedure -> Joint/Bursa Injection (Specify joint and laterality) Cmt: right             hip injection    Pre-op Diagnosis -> Osteoarthritis of right hip     Order Specific Information  Order: Procedure Order to Pain Management [Custom: [id number]]  Order #:          [id number]: 1 FUTURE      Priority: Routine  Class: Clinic Performed    Future Order Information      Expires on:2024            Expected by:2023                   Associated Diagnoses      M16.11 Primary osteoarthritis of right hip      Physician -> du Cmt: 10/10/23        Is patient on anti-coagulants? -> No         Facility Name: -> [facility]           Priority: Routine  Class: Clinic Performed    Future Order   Information      Expires on:2024            Expected by:2023                   Associated Diagnoses      M16.11 Primary osteoarthritis of right hip      Procedure -> Joint/Bursa Injection (Specify joint and laterality) Cmt:                 right hip injection        Physician -> du Cmt: 10/10/23        Is patient on anti-coagulants? -> No         Pre-op Diagnosis -> Osteoarthritis of right hip         Facility Name:   -> [facility]

## 2023-09-13 NOTE — H&P (VIEW-ONLY)
Chronic Pain - f/u    Referring Physician: No ref. provider found    Date: 09/13/2023     Re: Irvin Diaz Jr.  MR#: [id number]  YOB: 1975  Age: 48 y.o.    Chief Complaint: hip pain  No chief complaint on file.    **This note is dictated using the M*Modal Fluency Direct word recognition program. There are word recognition mistakes that are occasionally missed on review.**    ASSESSMENT: 48 y.o. year old male with left hip pain, consistent with     1. Primary osteoarthritis of right hip  Procedure Order to Pain Management      2. Avascular necrosis of hip, left  traMADoL (ULTRAM) 50 mg tablet      3. Chronic pain syndrome        4. Chronic left hip pain  traMADoL (ULTRAM) 50 mg tablet        PLAN:     Avascular necrosis of the left hip   -s/p hip block without significant pain relief and complicated by Hematoma.   -tolerating oxycodone 5 mg BID PRN (he thinks his lucid dreams were due to EtOH withdrawal). No more lucid dreams. Switch back to tramadol  -switched back to tramadol because he has been doing better and does not think that he needs the oxycodone anymore.  -refill Tramadol QID. This has been working for him. Helps him function. Rfill x4 months  - Medrol dose pack, helped while taking, but not sustained.  -not surgical candidate  -Not a candidate for further procedures due to bleeding risk  - Plts improved to ~110.  -buprenorphine likely not an option 2/2 to his ESLD and liver transplant status    Right hip pain  -XR Right hip given hx of avascular necrosis in the left hip. This showed moderate OA in the hip  -disucssed right hip injection. He would liek to proceed    Allodynia  -stopped Nortriptyline to 50 mg nightly. Helps him sleep, but made him nauseous    Thorombocytopenia 2/2 cirrhosis  -platelets are 92 on 5/26/22, 82 on 9/7/22, 74 on 10/25/22, 110 on 5/18/23  -Avanos recommended above 50, but he had hematoma, so will not proceed.    Chronic Opioid Use  - UDS from October and  September did not show opioids but he states he was decreasing use at that time.  -no UDS today, He had a drug screen 05/2023 which was fine.    Cirrhosis 2/2 past EtOH. He states he is not drinking anymore.  -following with hepatology  -possible liver transplant?  -working with psychiatry for EtOH.    Kidney disease  -GFR 54    - RTC before 1/14/23  - Counseled patient regarding the importance of  activity modification.    The above plan and management options were discussed at length with patient. Patient is in agreement with the above and verbalized understanding. It will be communicated with the referring physician via electronic record, fax, or mail.  Lab/study reports reviewed were important and necessary because subsequent medical and treatment recommendations required review of the above lab/study reports. Images viewed/reviewed above were important and necessary because subsequent medical and treatment recommendations required review of the reviewed image(s).     Electronically signed by:  Robbin De La Garza,   09/13/2023    =========================================================================================================    SUBJECTIVE:    Interval History 9/13/2023:   Irvin Diaz Jr. is a 48 y.o. male presents to the clinic for follow up.  Since last visit the pain has has worsened.    The pain is located in the groin, left knee, and both ankles area and radiates to the knee and ankle .  The pain is described as aching    At BEST  8/10   At WORST  10/10 on the WORST day.    On average pain is rated as 8/10.   Today the pain is rated as 8/10  Symptoms interfere with daily activity and sleeping.   Exacerbating factors: Sitting, Standing, Bending, Walking, and Flexing.    Mitigating factors medications.     Current pain medications: Tramadol 50mg QID  Failed Pain Medications: oxycodone, tramadol, gabapentin, cannot take NSAIDs due to gastric bleeding, cannot take tylenol due to liver  failure.     Pain procedures:  6/24/22 - left hip block - no relief. C/b hematoma formation    Interval History 6/21/2023:   Irvin Diaz Jr. is a 48 y.o. male presents to the clinic for follow up.  Since last visit the pain has has improved in some ways and worsened in others. The right hip has been bothering him more now. States that his right leg is clicking.     The pain is located in the left hip area and radiates to the right hip and down to his left knee .  The pain is described as aching and throbbing    At BEST  7/10   At WORST  9/10 on the WORST day.    On average pain is rated as 8/10.   Today the pain is rated as 8/10  Symptoms interfere with daily activity.   Exacerbating factors: Bending.    Mitigating factors medications (tramadol not oxycodone).     Interval History 1/25/2023:     Irvin Diaz Jr. is a 48 y.o. male presents to the clinic for follow up.  Since last visit the pain has has moderately improved. Reports that he went on a cruise and noticed his pain was doing better.  He has stopped using the cane because it has been better.  He went back to tramadol because he did not think he required the oxycodone. Pain 8/10 today. Feels like a tolerable 8.    Interval History 10/31/2022:     Irvin Diaz Jr. is a 48 y.o. male presents to the clinic for follow up.  Since last visit the pain has has significantly worsened.    The pain is located in the L hip area and radiates to the L foot .  The pain is described as aching, dull, shooting, stabbing, tight band, and tingling    At BEST  8/10   At WORST  10/10 on the WORST day.    On average pain is rated as 7/10.   Today the pain is rated as 8/10  Symptoms interfere with daily activity, sleeping, and work.   Exacerbating factors: Standing, Bending, Coughing/Sneezing, Walking, and Getting out of bed/chair.    Mitigating factors nothing and medications.     Interval History 9/7/2022:     Irvin Diaz Jr. is a 48 y.o. male presents  to the clinic for follow up.  Since last visit the pain has has worsened. He is now using a walker full time for the last 2 weeks. In a wheelchair today. He is unable to describe if his limited mobility is due to just pain or maybe a component of weakness.     The pain is located in the L hip area and radiates to the L foot .  The pain is described as aching, shooting, and stabbing    At BEST  7/10   At WORST  10/10 on the WORST day.    On average pain is rated as 8/10.   Today the pain is rated as 10/10  Symptoms interfere with daily activity, sleeping, and work.   Exacerbating factors: Standing, Laying, Bending, Walking, Lifting, and Getting out of bed/chair.    Mitigating factors none.     Interval History 8/2/2022:     Irvin Murilloder  is a 48 y.o. male presents to the clinic for follow up.  Since last visit the pain has is unchanged.  He stopped the Oxycodone because it gave severe, lucid dreams.  Ran out of tramadol and has not been taking anything for his pain.    The pain is located in the left hip area and radiates to the left knee/ groin .  The pain is described as aching, shooting and stabbing    At BEST  8/10   At WORST  10/10 on the WORST day.    On average pain is rated as 8/10.   Today the pain is rated as 8/10  Symptoms interfere with daily activity, sleeping and work.   Exacerbating factors: Standing, Walking and Getting out of bed/chair.    Mitigating factors nothing, heat, ice, medications and rest.     Interval History 7/5/2022:     Irvin Murilloder . is a 48 y.o. male presents to the clinic for follow up.  Since last visit the pain has is unchanged.  He is s/p hip block that was complicated by hematoma in the pectineus muscle.  His Hgb has dropped, although the hematoma is resolving.  Recommended that patient go to ED as recommended by transplant.    The pain is located in the left hip area and radiates to the left leg/ groin  .  The pain is described as aching, shooting and stabbing     At BEST  8/10   At WORST  10/10 on the WORST day.    On average pain is rated as 8/10.   Today the pain is rated as 8/10  Symptoms interfere with daily activity, sleeping and work.   Exacerbating factors: Standing, Walking and Getting out of bed/chair.    Mitigating factors nothing, heat, ice, medications and rest.     Interval History 5/30/2022:     Irvin Diaz Jr. is a 48 y.o. male presents to the clinic for follow up.  Since last visit the pain has is unchanged. He missed his original procedure date due to not checking his voicemail and transportation issues. He is still interested in the procedure.  WE had a long talk about bleeding risk with his low platelets and high INR.    The pain is located in the left hip area and radiates to the left leg/groin .  The pain is described as aching, shooting and stabbing    At BEST  8/10   At WORST  10/10 on the WORST day.    On average pain is rated as 8/10.   Today the pain is rated as 8/10  Symptoms interfere with daily activity, sleeping and work.   Exacerbating factors: walking.    Mitigating factors nothing.     Initial Hx:  Ivrin Diaz Jr. is a 48 y.o. male presents to the clinic for the evaluation of left hip pain. The pain started 3 years ago following fall and symptoms have been worsening. The patient states used to get hip injections of the left hip.  He is unable to walk without a cane.  He states that he was told after imaging that he had a fracture in 2019.  He had an injection (and aspiration) in December/january and it helps the pain for about 3 weeks.  After the injections he still needs the pain but it helps.  The ortho physician at [facility]    CT note about the hip:  There is collapse of the left femoral head superior portion with bone-on-bone as well as underlying femoral sclerotic changes suggesting AVN with severe secondary degenerative changes of the acetabulum and the left hip effusion stable since prior exam.     The  patient says that he has seen a ortho surgeon in the past and that they are unable to do surgery due to his liver failure.    Pain Description:    The pain is located in the left hip area and radiates to the left leg/ groin area .    At BEST  8/10   At WORST  10/10 on the WORST day.    On average pain is rated as 8/10.   Today the pain is rated as 8/10  The pain is continuous.  The pain is described as aching, shooting and throbbing    Symptoms interfere with daily activity, sleeping and work.   Exacerbating factors: Standing, Laying, Bending, Walking, Night Time, Morning, Flexing, Lifting and Getting out of bed/chair.    Mitigating factors laying down and medications.   He reports 5 hours of sleep per night.    Physical Therapy/Home Exercise: No, not currently in physical therapy or home exercise program    Current Pain Medications:    - none    Failed Pain Medications:    - cannot take NSAIDs due to gastric bleeding, cannot take tylenol due to liver failure.     Pain Treatment Therapies:    Pain procedures: hip injections  Physical Therapy: physical therapy made things worse  Chiropractor: none  Acupuncture: none  TENS unit: none  Spinal decompression: none  Joint replacement: none    Patient denies urinary incontinence, bowel incontinence and loss of sensations. (+) weakness in the left leg  Patient denies any suicidal or homicidal ideations     report:  Reviewed and consistent with medication use as prescribed.    Imaging:   XR abdomen 03/2022:  Please note the hemidiaphragms are not included in their entirety.  Multiple surgical clips and presumed anastomotic suture line project over the right abdomen.  There are a few mildly prominent loops of gas-filled small bowel loops present measuring up to 3.3 cm in the left abdomen.  Limited evaluation of free intraperitoneal air to patient positioning/technique.  Calcifications project over the pelvis, likely phleboliths.  Osseous structures demonstrate significant  degenerative change of the left hip with chronic appearing deformity/collapse of the left femoral head.    CT abdomen 03/2022:  Mild circumferential wall thickening involving the proximal colon extending from the ileocolic anastomosis at the hepatic flexure through around the level of the splenic flexure suggests inflammatory or infectious colitis.  No convincing transmural inflammation is seen.     Postoperative changes of proximal colectomy and scattered mild diverticulosis of the more distal colon.  No convincing diverticulitis, bowel obstruction, free air or abscess.     Findings of cirrhosis and mild abdominal ascites as described essentially stable.     Left hip findings suggesting AVN as described.     This report was flagged in Epic as abnormal.     No other significant or acute findings.       Past Medical History:   Diagnosis Date    Alcohol withdrawal 5/1/2022    Alcoholic cirrhosis of liver     Alcoholic ketoacidosis 6/6/2021    Arthritis     ATN (acute tubular necrosis)     CHF (congestive heart failure)     Diverticulitis 01/2020    Esophageal varices     GERD (gastroesophageal reflux disease)     GI bleed     Hip arthritis     Left    Hypertension     Liver cirrhosis     Macrocytic anemia     Pulmonary embolism 08/2018    Unprovoked DVT.  Stop Coumadin due to GIB    Thrombocytopenia      Past Surgical History:   Procedure Laterality Date    ANKLE SURGERY      BLOCK, NERVE, PERIPHERAL Left 06/24/2022    Procedure: Left Hip femoral-obturator accessory nerve block;  Surgeon: Robbin De La Garza DO;  Location: [facility] OR;  Service: Pain Management;  Laterality: Left;    COLON SURGERY  2007    COLONOSCOPY  09/05/2019    [facility]    COLONOSCOPY N/A 02/17/2021    Procedure: COLONOSCOPY;  Surgeon: Renea Billingsley MD;  Location: [facility];  Service: Endoscopy;  Laterality: N/A;    COLONOSCOPY N/A 2/13/2023    Procedure: COLONOSCOPY;  Surgeon: Rudy Orellana MD;  Location: [facility] ([address] [place] [address]);  Service: Endoscopy;   Laterality: N/A;  cirrhosis-labs done on 1/11/23  instr portal-GT  No answer for precall- [place]    COLONOSCOPY N/A 3/10/2023    Procedure: COLONOSCOPY;  Surgeon: Rudy Orellana MD;  Location: [address]);  Service: Endoscopy;  Laterality: N/A;  inst via poral per pt request    COLONOSCOPY W/ BIOPSIES  02/17/2021    ESOPHAGOGASTRODUODENOSCOPY N/A 07/26/2019    Procedure: EGD (ESOPHAGOGASTRODUODENOSCOPY);  Surgeon: Brian Trivedi MD;  Location: [facility];  Service: Endoscopy;  Laterality: N/A;    ESOPHAGOGASTRODUODENOSCOPY N/A 04/08/2020    Procedure: EGD (ESOPHAGOGASTRODUODENOSCOPY);  Surgeon: Donn Acuna MD;  Location: [facility];  Service: Endoscopy;  Laterality: N/A;    ESOPHAGOGASTRODUODENOSCOPY N/A 01/03/2021    Procedure: EGD (ESOPHAGOGASTRODUODENOSCOPY)- coffee ground emesis, hx varices;  Surgeon: Steve Chairez MD;  Location: [facility];  Service: Endoscopy;  Laterality: N/A;    ESOPHAGOGASTRODUODENOSCOPY N/A 05/03/2021    Procedure: EGD (ESOPHAGOGASTRODUODENOSCOPY);  Surgeon: Jeanmarie Ngo MD;  Location: [facility];  Service: Endoscopy;  Laterality: N/A;    ESOPHAGOGASTRODUODENOSCOPY N/A 07/19/2021    Procedure: ESOPHAGOGASTRODUODENOSCOPY (EGD);  Surgeon: Claudio Medeiros MD;  Location: [facility];  Service: Endoscopy;  Laterality: N/A;    ESOPHAGOGASTRODUODENOSCOPY  12/20/2021    ESOPHAGOGASTRODUODENOSCOPY N/A 12/20/2021    Procedure: EGD (ESOPHAGOGASTRODUODENOSCOPY);  Surgeon: Ajay Jackson MD;  Location: [facility];  Service: Endoscopy;  Laterality: N/A;    ESOPHAGOGASTRODUODENOSCOPY N/A 05/03/2022    Procedure: EGD (ESOPHAGOGASTRODUODENOSCOPY);  Surgeon: Brian Trivedi MD;  Location: [facility];  Service: Endoscopy;  Laterality: N/A;    ESOPHAGOGASTRODUODENOSCOPY N/A 7/7/2022    Procedure: EGD (ESOPHAGOGASTRODUODENOSCOPY);  Surgeon: Ajay Jackson MD;  Location: [facility];  Service: Endoscopy;  Laterality: N/A;    ESOPHAGOGASTRODUODENOSCOPY N/A 11/29/2022    Procedure: EGD  (ESOPHAGOGASTRODUODENOSCOPY);  Surgeon: Edouard Maynard MD;  Location: [facility] ([address] [place] [address]);  Service: Endoscopy;  Laterality: N/A;    ESOPHAGOGASTRODUODENOSCOPY N/A 2023    Procedure: EGD (ESOPHAGOGASTRODUODENOSCOPY);  Surgeon: Caesar Dickens MD;  Location: [facility];  Service: Endoscopy;  Laterality: N/A;    HERNIA REPAIR Left     Inguinal    UPPER GASTROINTESTINAL ENDOSCOPY N/A 2022     Social History     Socioeconomic History    Marital status:    Tobacco Use    Smoking status: Former     Current packs/day: 0.00     Types: Cigarettes     Quit date: 2000     Years since quittin.7    Smokeless tobacco: Never    Tobacco comments:     quit 20 years ago   Substance and Sexual Activity    Alcohol use: Not Currently     Comment: Quit drinking 21    Drug use: Not Currently    Sexual activity: Yes     Comment: occ     Social Determinants of Health     Financial Resource Strain: Low Risk  (2022)    Overall Financial Resource Strain (CARDIA)     Difficulty of Paying Living Expenses: Not very hard   Food Insecurity: No Food Insecurity (2022)    Hunger Vital Sign     Worried About Running Out of Food in the Last Year: Never true     Ran Out of Food in the Last Year: Never true   Transportation Needs: No Transportation Needs (2022)    PRAPARE - Transportation     Lack of Transportation (Medical): No     Lack of Transportation (Non-Medical): No   Physical Activity: Inactive (2022)    Exercise Vital Sign     Days of Exercise per Week: 0 days     Minutes of Exercise per Session: 0 min   Stress: No Stress Concern Present (2022)    [employer] [employer] - Occupational Stress Questionnaire     Feeling of Stress : Not at all   Social Connections: Moderately Integrated (2022)    Social Connection and Isolation Panel [NHANES]     Frequency of Communication with Friends and Family: More than three times a week     Frequency of Social Gatherings  with Friends and Family: More than three times a week     Attends Shinto Services: Never     Active Member of Clubs or Organizations: Yes     Attends Club or Organization Meetings: Never     Marital Status:    Housing Stability: Low Risk  (11/29/2022)    Housing Stability Vital Sign     Unable to Pay for Housing in the Last Year: No     Number of Places Lived in the Last Year: 1     Unstable Housing in the Last Year: No     Family History   Problem Relation Age of Onset    Hypertension Mother     Breast cancer Mother     Hypertension Father     Prostate cancer Father     Bladder Cancer Father        Review of patient's allergies indicates:   Allergen Reactions    Ciprofloxacin hcl Hallucinations    Meperidine Hives     Pt medicated w/multiple medications (demerol, protonix, and zofran)  w/i 10 mins time frame prior to developing localized hives near IV site that med was administered. Pt reports he has/takes all other medications on daily basis.     Morphine Itching    Nsaids (non-steroidal anti-inflammatory drug)      Kidney Disease    Tylenol [acetaminophen]      Hx of liver disease       Current Outpatient Medications   Medication Sig    acamprosate (CAMPRAL) 333 mg tablet Take 2 tablets (666 mg total) by mouth 3 (three) times daily.    amLODIPine (NORVASC) 10 MG tablet Take 1 tablet (10 mg total) by mouth once daily.    augmented betamethasone dipropionate (DIPROLENE-AF) 0.05 % cream Apply topically 2 (two) times daily. (Patient taking differently: Apply 1 application  topically 2 (two) times daily.)    calcitRIOL (ROCALTROL) 0.25 MCG Cap Take 1 capsule (0.25 mcg total) by mouth once daily.    clindamycin-benzoyl peroxide (BENZACLIN) gel Apply topically 2 (two) times daily. (Patient taking differently: Apply 1 application  topically 2 (two) times daily.)    ferrous gluconate (FERGON) 240 (27 FE) MG tablet Take 2 tablets (480 mg total) by mouth 2 (two) times daily with meals.    folic acid (FOLVITE) 1 MG  tablet Take 1 tablet (1 mg total) by mouth once daily.    furosemide (LASIX) 20 MG tablet Take 1 tablet (20 mg total) by mouth once daily.    lactulose (CHRONULAC) 10 gram/15 mL solution Take 30 mLs (20 g total) by mouth 3 (three) times daily. Take enough to have 2 to 3 bowel movements a day to prevent hepatic encephalopathy.    nortriptyline (PAMELOR) 50 MG capsule Take 1 capsule (50 mg total) by mouth nightly as needed (pain and insomnia).    rifAXIMin (XIFAXAN) 550 mg Tab Take 1 tablet (550 mg total) by mouth 2 (two) times daily.    sildenafiL (VIAGRA) 100 MG tablet Take 1 tablet (100 mg total) by mouth daily as needed for Erectile Dysfunction.    spironolactone (ALDACTONE) 50 MG tablet Take 1 tablet (50 mg total) by mouth once daily.    thiamine 100 MG tablet Take 1 tablet (100 mg total) by mouth once daily.    carvediloL (COREG) 12.5 MG tablet Take 1 tablet (12.5 mg total) by mouth 2 (two) times daily with meals.    pantoprazole (PROTONIX) 40 MG tablet Take 2 tablets (80 mg total) by mouth 2 (two) times daily.    [START ON 9/16/2023] traMADoL (ULTRAM) 50 mg tablet Take 1 tablet (50 mg total) by mouth 4 (four) times daily as needed for Pain.     No current facility-administered medications for this visit.       REVIEW OF SYSTEMS:    GENERAL:  No weight loss, malaise or fevers.   HEENT:   No recent changes in vision or hearing   NECK:  Negative for lumps, no difficulty with swallowing.  RESPIRATORY:  Negative for cough, wheezing or shortness of breath, patient denies any recent URI.  CARDIOVASCULAR:  Negative for chest pain, leg swelling or palpitations.  GI:  Negative for abdominal discomfort, blood in stools or black stools or change in bowel habits.  MUSCULOSKELETAL:  See HPI.  SKIN:  Negative for lesions, rash, and itching. + skin itching  PSYCH:  No mood disorder or recent psychosocial stressors.  Patients sleep is not disturbed secondary to pain.  HEMATOLOGY/LYMPHOLOGY:  Negative for prolonged bleeding,  bruising easily or swollen nodes.  Patient is not currently taking any anti-coagulants  NEURO:   No history of headaches, syncope, paralysis, seizures or tremors.  All other reviewed and negative other than HPI.    OBJECTIVE:    BP (!) 156/85 (BP Location: Right arm, Patient Position: Sitting)   Pulse 81   Ht 6' (1.829 m)   Wt 93.9 kg (207 lb 0.2 oz)   BMI 28.08 kg/m²     PHYSICAL EXAMINATION:    GENERAL: Fraile, in no acute distress, alert and oriented x3.  PSYCH:  Mood and affect appropriate.  SKIN: Skin color, texture, turgor normal, no rashes or lesions on visible skin.  HEAD/FACE:  Normocephalic, atraumatic. Cranial nerves grossly intact.  CV: RRR with palpation of the radial artery.  PULM: CTAB. No evidence of respiratory difficulty, symmetric chest rise.  GI:  Soft    MUSKULOSKELETAL:    EXTREMITIES:   Left Hip Exam (limited ROM and exam 2/2/ pain)  - Log Roll Positive  - FADIR Positive  - Stinchfield Unable to Perform  - Hip Scour Unable to Perform  - GTB Tenderness Positive   -TTP over anterior hip joint. Posterior not palpated  - TTP of the superior knee joint.    Right hip exam:  (+) log roll  (+) fadir  (+) TTP of the right groin    MUSCULOSKELETAL:  Atrophy of the left leg compared to the right  No deformities, edema, or skin discoloration are noted on visible skin. Good capillary refill.     NEURO: Bilateral upper and lower extremity coordination and muscle stretch reflexes are physiologic and symmetric.      NEUROLOGICAL EXAM:  MENTAL STATUS: A x O x 3, good concentration, speech is fluent and goal directed  MEMORY: recent and remote are intact  CN: CN2-12 grossly intact  MOTOR: 5/5 in all muscle groups on the right. HF 3/5 on the left, 4/5 KE, 4/5 KF with pain  DTRs: 3+ intact symmetric patella and 2 + achilles  Sensation:    -yes Loss of sensation in a left lower L-1 and L-2 on the left distribution.  Babinski: absent     Gait: Cane, abnormal. Short stride. Favors left leg

## 2023-09-13 NOTE — PROGRESS NOTES
Chronic Pain - f/u    Referring Physician: No ref. provider found    Date: 09/13/2023     Re: Irvin Diaz Jr.  MR#: [id number]  YOB: 1975  Age: 48 y.o.    Chief Complaint: hip pain  No chief complaint on file.    **This note is dictated using the M*Modal Fluency Direct word recognition program. There are word recognition mistakes that are occasionally missed on review.**    ASSESSMENT: 48 y.o. year old male with left hip pain, consistent with     1. Primary osteoarthritis of right hip  Procedure Order to Pain Management      2. Avascular necrosis of hip, left  traMADoL (ULTRAM) 50 mg tablet      3. Chronic pain syndrome        4. Chronic left hip pain  traMADoL (ULTRAM) 50 mg tablet        PLAN:     Avascular necrosis of the left hip   -s/p hip block without significant pain relief and complicated by Hematoma.   -tolerating oxycodone 5 mg BID PRN (he thinks his lucid dreams were due to EtOH withdrawal). No more lucid dreams. Switch back to tramadol  -switched back to tramadol because he has been doing better and does not think that he needs the oxycodone anymore.  -refill Tramadol QID. This has been working for him. Helps him function. Rfill x4 months  - Medrol dose pack, helped while taking, but not sustained.  -not surgical candidate  -Not a candidate for further procedures due to bleeding risk  - Plts improved to ~110.  -buprenorphine likely not an option 2/2 to his ESLD and liver transplant status    Right hip pain  -XR Right hip given hx of avascular necrosis in the left hip. This showed moderate OA in the hip  -disucssed right hip injection. He would liek to proceed    Allodynia  -stopped Nortriptyline to 50 mg nightly. Helps him sleep, but made him nauseous    Thorombocytopenia 2/2 cirrhosis  -platelets are 92 on 5/26/22, 82 on 9/7/22, 74 on 10/25/22, 110 on 5/18/23  -Avanos recommended above 50, but he had hematoma, so will not proceed.    Chronic Opioid Use  - UDS from October and  September did not show opioids but he states he was decreasing use at that time.  -no UDS today, He had a drug screen 05/2023 which was fine.    Cirrhosis 2/2 past EtOH. He states he is not drinking anymore.  -following with hepatology  -possible liver transplant?  -working with psychiatry for EtOH.    Kidney disease  -GFR 54    - RTC before 1/14/23  - Counseled patient regarding the importance of  activity modification.    The above plan and management options were discussed at length with patient. Patient is in agreement with the above and verbalized understanding. It will be communicated with the referring physician via electronic record, fax, or mail.  Lab/study reports reviewed were important and necessary because subsequent medical and treatment recommendations required review of the above lab/study reports. Images viewed/reviewed above were important and necessary because subsequent medical and treatment recommendations required review of the reviewed image(s).     Electronically signed by:  Robbin De La Garza,   09/13/2023    =========================================================================================================    SUBJECTIVE:    Interval History 9/13/2023:   Irvin Diaz Jr. is a 48 y.o. male presents to the clinic for follow up.  Since last visit the pain has has worsened.    The pain is located in the groin, left knee, and both ankles area and radiates to the knee and ankle .  The pain is described as aching    At BEST  8/10   At WORST  10/10 on the WORST day.    On average pain is rated as 8/10.   Today the pain is rated as 8/10  Symptoms interfere with daily activity and sleeping.   Exacerbating factors: Sitting, Standing, Bending, Walking, and Flexing.    Mitigating factors medications.     Current pain medications: Tramadol 50mg QID  Failed Pain Medications: oxycodone, tramadol, gabapentin, cannot take NSAIDs due to gastric bleeding, cannot take tylenol due to liver  failure.     Pain procedures:  6/24/22 - left hip block - no relief. C/b hematoma formation    Interval History 6/21/2023:   Irvin Diaz Jr. is a 48 y.o. male presents to the clinic for follow up.  Since last visit the pain has has improved in some ways and worsened in others. The right hip has been bothering him more now. States that his right leg is clicking.     The pain is located in the left hip area and radiates to the right hip and down to his left knee .  The pain is described as aching and throbbing    At BEST  7/10   At WORST  9/10 on the WORST day.    On average pain is rated as 8/10.   Today the pain is rated as 8/10  Symptoms interfere with daily activity.   Exacerbating factors: Bending.    Mitigating factors medications (tramadol not oxycodone).     Interval History 1/25/2023:     Irvin Diaz Jr. is a 48 y.o. male presents to the clinic for follow up.  Since last visit the pain has has moderately improved. Reports that he went on a cruise and noticed his pain was doing better.  He has stopped using the cane because it has been better.  He went back to tramadol because he did not think he required the oxycodone. Pain 8/10 today. Feels like a tolerable 8.    Interval History 10/31/2022:     Irvin Diaz Jr. is a 48 y.o. male presents to the clinic for follow up.  Since last visit the pain has has significantly worsened.    The pain is located in the L hip area and radiates to the L foot .  The pain is described as aching, dull, shooting, stabbing, tight band, and tingling    At BEST  8/10   At WORST  10/10 on the WORST day.    On average pain is rated as 7/10.   Today the pain is rated as 8/10  Symptoms interfere with daily activity, sleeping, and work.   Exacerbating factors: Standing, Bending, Coughing/Sneezing, Walking, and Getting out of bed/chair.    Mitigating factors nothing and medications.     Interval History 9/7/2022:     Irvin Diaz Jr. is a 48 y.o. male presents  to the clinic for follow up.  Since last visit the pain has has worsened. He is now using a walker full time for the last 2 weeks. In a wheelchair today. He is unable to describe if his limited mobility is due to just pain or maybe a component of weakness.     The pain is located in the L hip area and radiates to the L foot .  The pain is described as aching, shooting, and stabbing    At BEST  7/10   At WORST  10/10 on the WORST day.    On average pain is rated as 8/10.   Today the pain is rated as 10/10  Symptoms interfere with daily activity, sleeping, and work.   Exacerbating factors: Standing, Laying, Bending, Walking, Lifting, and Getting out of bed/chair.    Mitigating factors none.     Interval History 8/2/2022:     Irvin Murilloder  is a 48 y.o. male presents to the clinic for follow up.  Since last visit the pain has is unchanged.  He stopped the Oxycodone because it gave severe, lucid dreams.  Ran out of tramadol and has not been taking anything for his pain.    The pain is located in the left hip area and radiates to the left knee/ groin .  The pain is described as aching, shooting and stabbing    At BEST  8/10   At WORST  10/10 on the WORST day.    On average pain is rated as 8/10.   Today the pain is rated as 8/10  Symptoms interfere with daily activity, sleeping and work.   Exacerbating factors: Standing, Walking and Getting out of bed/chair.    Mitigating factors nothing, heat, ice, medications and rest.     Interval History 7/5/2022:     Irvin Murilloder . is a 48 y.o. male presents to the clinic for follow up.  Since last visit the pain has is unchanged.  He is s/p hip block that was complicated by hematoma in the pectineus muscle.  His Hgb has dropped, although the hematoma is resolving.  Recommended that patient go to ED as recommended by transplant.    The pain is located in the left hip area and radiates to the left leg/ groin  .  The pain is described as aching, shooting and stabbing     At BEST  8/10   At WORST  10/10 on the WORST day.    On average pain is rated as 8/10.   Today the pain is rated as 8/10  Symptoms interfere with daily activity, sleeping and work.   Exacerbating factors: Standing, Walking and Getting out of bed/chair.    Mitigating factors nothing, heat, ice, medications and rest.     Interval History 5/30/2022:     Irvin Diaz Jr. is a 48 y.o. male presents to the clinic for follow up.  Since last visit the pain has is unchanged. He missed his original procedure date due to not checking his voicemail and transportation issues. He is still interested in the procedure.  WE had a long talk about bleeding risk with his low platelets and high INR.    The pain is located in the left hip area and radiates to the left leg/groin .  The pain is described as aching, shooting and stabbing    At BEST  8/10   At WORST  10/10 on the WORST day.    On average pain is rated as 8/10.   Today the pain is rated as 8/10  Symptoms interfere with daily activity, sleeping and work.   Exacerbating factors: walking.    Mitigating factors nothing.     Initial Hx:  Irvin Diaz Jr. is a 48 y.o. male presents to the clinic for the evaluation of left hip pain. The pain started 3 years ago following fall and symptoms have been worsening. The patient states used to get hip injections of the left hip.  He is unable to walk without a cane.  He states that he was told after imaging that he had a fracture in 2019.  He had an injection (and aspiration) in December/january and it helps the pain for about 3 weeks.  After the injections he still needs the pain but it helps.  The ortho physician at [facility]    CT note about the hip:  There is collapse of the left femoral head superior portion with bone-on-bone as well as underlying femoral sclerotic changes suggesting AVN with severe secondary degenerative changes of the acetabulum and the left hip effusion stable since prior exam.     The  patient says that he has seen a ortho surgeon in the past and that they are unable to do surgery due to his liver failure.    Pain Description:    The pain is located in the left hip area and radiates to the left leg/ groin area .    At BEST  8/10   At WORST  10/10 on the WORST day.    On average pain is rated as 8/10.   Today the pain is rated as 8/10  The pain is continuous.  The pain is described as aching, shooting and throbbing    Symptoms interfere with daily activity, sleeping and work.   Exacerbating factors: Standing, Laying, Bending, Walking, Night Time, Morning, Flexing, Lifting and Getting out of bed/chair.    Mitigating factors laying down and medications.   He reports 5 hours of sleep per night.    Physical Therapy/Home Exercise: No, not currently in physical therapy or home exercise program    Current Pain Medications:    - none    Failed Pain Medications:    - cannot take NSAIDs due to gastric bleeding, cannot take tylenol due to liver failure.     Pain Treatment Therapies:    Pain procedures: hip injections  Physical Therapy: physical therapy made things worse  Chiropractor: none  Acupuncture: none  TENS unit: none  Spinal decompression: none  Joint replacement: none    Patient denies urinary incontinence, bowel incontinence and loss of sensations. (+) weakness in the left leg  Patient denies any suicidal or homicidal ideations     report:  Reviewed and consistent with medication use as prescribed.    Imaging:   XR abdomen 03/2022:  Please note the hemidiaphragms are not included in their entirety.  Multiple surgical clips and presumed anastomotic suture line project over the right abdomen.  There are a few mildly prominent loops of gas-filled small bowel loops present measuring up to 3.3 cm in the left abdomen.  Limited evaluation of free intraperitoneal air to patient positioning/technique.  Calcifications project over the pelvis, likely phleboliths.  Osseous structures demonstrate significant  degenerative change of the left hip with chronic appearing deformity/collapse of the left femoral head.    CT abdomen 03/2022:  Mild circumferential wall thickening involving the proximal colon extending from the ileocolic anastomosis at the hepatic flexure through around the level of the splenic flexure suggests inflammatory or infectious colitis.  No convincing transmural inflammation is seen.     Postoperative changes of proximal colectomy and scattered mild diverticulosis of the more distal colon.  No convincing diverticulitis, bowel obstruction, free air or abscess.     Findings of cirrhosis and mild abdominal ascites as described essentially stable.     Left hip findings suggesting AVN as described.     This report was flagged in Epic as abnormal.     No other significant or acute findings.       Past Medical History:   Diagnosis Date    Alcohol withdrawal 5/1/2022    Alcoholic cirrhosis of liver     Alcoholic ketoacidosis 6/6/2021    Arthritis     ATN (acute tubular necrosis)     CHF (congestive heart failure)     Diverticulitis 01/2020    Esophageal varices     GERD (gastroesophageal reflux disease)     GI bleed     Hip arthritis     Left    Hypertension     Liver cirrhosis     Macrocytic anemia     Pulmonary embolism 08/2018    Unprovoked DVT.  Stop Coumadin due to GIB    Thrombocytopenia      Past Surgical History:   Procedure Laterality Date    ANKLE SURGERY      BLOCK, NERVE, PERIPHERAL Left 06/24/2022    Procedure: Left Hip femoral-obturator accessory nerve block;  Surgeon: Robbin De La Garza DO;  Location: [facility];  Service: Pain Management;  Laterality: Left;    COLON SURGERY  2007    COLONOSCOPY  09/05/2019    [facility]    COLONOSCOPY N/A 02/17/2021    Procedure: COLONOSCOPY;  Surgeon: Renea Billingsley MD;  Location: [facility];  Service: Endoscopy;  Laterality: N/A;    COLONOSCOPY N/A 2/13/2023    Procedure: COLONOSCOPY;  Surgeon: Rudy Orellana MD;  Location: [facility] ([address] [place] [address]);  Service: Endoscopy;   Laterality: N/A;  cirrhosis-labs done on 1/11/23  instr portal-GT  No answer for precall- [place]    COLONOSCOPY N/A 3/10/2023    Procedure: COLONOSCOPY;  Surgeon: Rudy Orellana MD;  Location: [address]);  Service: Endoscopy;  Laterality: N/A;  inst via poral per pt request    COLONOSCOPY W/ BIOPSIES  02/17/2021    ESOPHAGOGASTRODUODENOSCOPY N/A 07/26/2019    Procedure: EGD (ESOPHAGOGASTRODUODENOSCOPY);  Surgeon: Brian Trivedi MD;  Location: [facility];  Service: Endoscopy;  Laterality: N/A;    ESOPHAGOGASTRODUODENOSCOPY N/A 04/08/2020    Procedure: EGD (ESOPHAGOGASTRODUODENOSCOPY);  Surgeon: Donn Acuna MD;  Location: [facility];  Service: Endoscopy;  Laterality: N/A;    ESOPHAGOGASTRODUODENOSCOPY N/A 01/03/2021    Procedure: EGD (ESOPHAGOGASTRODUODENOSCOPY)- coffee ground emesis, hx varices;  Surgeon: Steve Chairez MD;  Location[facility];  Service: Endoscopy;  Laterality: N/A;    ESOPHAGOGASTRODUODENOSCOPY N/A 05/03/2021    Procedure: EGD (ESOPHAGOGASTRODUODENOSCOPY);  Surgeon: Jeanmarie Ngo MD;  Location: [facility];  Service: Endoscopy;  Laterality: N/A;    ESOPHAGOGASTRODUODENOSCOPY N/A 07/19/2021    Procedure: ESOPHAGOGASTRODUODENOSCOPY (EGD);  Surgeon: Claudio Medeiros MD;  [facility];  Service: Endoscopy;  Laterality: N/A;    ESOPHAGOGASTRODUODENOSCOPY  12/20/2021    ESOPHAGOGASTRODUODENOSCOPY N/A 12/20/2021    Procedure: EGD (ESOPHAGOGASTRODUODENOSCOPY);  Surgeon: Ajay Jackson MD;  [facility];  Service: Endoscopy;  Laterality: N/A;    ESOPHAGOGASTRODUODENOSCOPY N/A 05/03/2022    Procedure: EGD (ESOPHAGOGASTRODUODENOSCOPY);  Surgeon: Brian Trivedi MD;  Location: [facility];  Service: Endoscopy;  Laterality: N/A;    ESOPHAGOGASTRODUODENOSCOPY N/A 7/7/2022    Procedure: EGD (ESOPHAGOGASTRODUODENOSCOPY);  Surgeon: Ajay Jackson MD;  [facility];  Service: Endoscopy;  Laterality: N/A;    ESOPHAGOGASTRODUODENOSCOPY N/A 11/29/2022    Procedure: EGD  (ESOPHAGOGASTRODUODENOSCOPY);  Surgeon: Edouard Maynard MD;  Location: [facility] ([address] [place] [address]);  Service: Endoscopy;  Laterality: N/A;    ESOPHAGOGASTRODUODENOSCOPY N/A 2023    Procedure: EGD (ESOPHAGOGASTRODUODENOSCOPY);  Surgeon: Caesar Dickens MD;  Location: [facility];  Service: Endoscopy;  Laterality: N/A;    HERNIA REPAIR Left     Inguinal    UPPER GASTROINTESTINAL ENDOSCOPY N/A 2022     Social History     Socioeconomic History    Marital status:    Tobacco Use    Smoking status: Former     Current packs/day: 0.00     Types: Cigarettes     Quit date: 2000     Years since quittin.7    Smokeless tobacco: Never    Tobacco comments:     quit 20 years ago   Substance and Sexual Activity    Alcohol use: Not Currently     Comment: Quit drinking 21    Drug use: Not Currently    Sexual activity: Yes     Comment: occ     Social Determinants of Health     Financial Resource Strain: Low Risk  (2022)    Overall Financial Resource Strain (CARDIA)     Difficulty of Paying Living Expenses: Not very hard   Food Insecurity: No Food Insecurity (2022)    Hunger Vital Sign     Worried About Running Out of Food in the Last Year: Never true     Ran Out of Food in the Last Year: Never true   Transportation Needs: No Transportation Needs (2022)    PRAPARE - Transportation     Lack of Transportation (Medical): No     Lack of Transportation (Non-Medical): No   Physical Activity: Inactive (2022)    Exercise Vital Sign     Days of Exercise per Week: 0 days     Minutes of Exercise per Session: 0 min   Stress: No Stress Concern Present (2022)    [employer] [place] [employer] - Occupational Stress Questionnaire     Feeling of Stress : Not at all   Social Connections: Moderately Integrated (2022)    Social Connection and Isolation Panel [NHANES]     Frequency of Communication with Friends and Family: More than three times a week     Frequency of Social Gatherings  with Friends and Family: More than three times a week     Attends Taoism Services: Never     Active Member of Clubs or Organizations: Yes     Attends Club or Organization Meetings: Never     Marital Status:    Housing Stability: Low Risk  (11/29/2022)    Housing Stability Vital Sign     Unable to Pay for Housing in the Last Year: No     Number of Places Lived in the Last Year: 1     Unstable Housing in the Last Year: No     Family History   Problem Relation Age of Onset    Hypertension Mother     Breast cancer Mother     Hypertension Father     Prostate cancer Father     Bladder Cancer Father        Review of patient's allergies indicates:   Allergen Reactions    Ciprofloxacin hcl Hallucinations    Meperidine Hives     Pt medicated w/multiple medications (demerol, protonix, and zofran)  w/i 10 mins time frame prior to developing localized hives near IV site that med was administered. Pt reports he has/takes all other medications on daily basis.     Morphine Itching    Nsaids (non-steroidal anti-inflammatory drug)      Kidney Disease    Tylenol [acetaminophen]      Hx of liver disease       Current Outpatient Medications   Medication Sig    acamprosate (CAMPRAL) 333 mg tablet Take 2 tablets (666 mg total) by mouth 3 (three) times daily.    amLODIPine (NORVASC) 10 MG tablet Take 1 tablet (10 mg total) by mouth once daily.    augmented betamethasone dipropionate (DIPROLENE-AF) 0.05 % cream Apply topically 2 (two) times daily. (Patient taking differently: Apply 1 application  topically 2 (two) times daily.)    calcitRIOL (ROCALTROL) 0.25 MCG Cap Take 1 capsule (0.25 mcg total) by mouth once daily.    clindamycin-benzoyl peroxide (BENZACLIN) gel Apply topically 2 (two) times daily. (Patient taking differently: Apply 1 application  topically 2 (two) times daily.)    ferrous gluconate (FERGON) 240 (27 FE) MG tablet Take 2 tablets (480 mg total) by mouth 2 (two) times daily with meals.    folic acid (FOLVITE) 1 MG  tablet Take 1 tablet (1 mg total) by mouth once daily.    furosemide (LASIX) 20 MG tablet Take 1 tablet (20 mg total) by mouth once daily.    lactulose (CHRONULAC) 10 gram/15 mL solution Take 30 mLs (20 g total) by mouth 3 (three) times daily. Take enough to have 2 to 3 bowel movements a day to prevent hepatic encephalopathy.    nortriptyline (PAMELOR) 50 MG capsule Take 1 capsule (50 mg total) by mouth nightly as needed (pain and insomnia).    rifAXIMin (XIFAXAN) 550 mg Tab Take 1 tablet (550 mg total) by mouth 2 (two) times daily.    sildenafiL (VIAGRA) 100 MG tablet Take 1 tablet (100 mg total) by mouth daily as needed for Erectile Dysfunction.    spironolactone (ALDACTONE) 50 MG tablet Take 1 tablet (50 mg total) by mouth once daily.    thiamine 100 MG tablet Take 1 tablet (100 mg total) by mouth once daily.    carvediloL (COREG) 12.5 MG tablet Take 1 tablet (12.5 mg total) by mouth 2 (two) times daily with meals.    pantoprazole (PROTONIX) 40 MG tablet Take 2 tablets (80 mg total) by mouth 2 (two) times daily.    [START ON 9/16/2023] traMADoL (ULTRAM) 50 mg tablet Take 1 tablet (50 mg total) by mouth 4 (four) times daily as needed for Pain.     No current facility-administered medications for this visit.       REVIEW OF SYSTEMS:    GENERAL:  No weight loss, malaise or fevers.   HEENT:   No recent changes in vision or hearing   NECK:  Negative for lumps, no difficulty with swallowing.  RESPIRATORY:  Negative for cough, wheezing or shortness of breath, patient denies any recent URI.  CARDIOVASCULAR:  Negative for chest pain, leg swelling or palpitations.  GI:  Negative for abdominal discomfort, blood in stools or black stools or change in bowel habits.  MUSCULOSKELETAL:  See HPI.  SKIN:  Negative for lesions, rash, and itching. + skin itching  PSYCH:  No mood disorder or recent psychosocial stressors.  Patients sleep is not disturbed secondary to pain.  HEMATOLOGY/LYMPHOLOGY:  Negative for prolonged bleeding,  bruising easily or swollen nodes.  Patient is not currently taking any anti-coagulants  NEURO:   No history of headaches, syncope, paralysis, seizures or tremors.  All other reviewed and negative other than HPI.    OBJECTIVE:    BP (!) 156/85 (BP Location: Right arm, Patient Position: Sitting)   Pulse 81   Ht 6' (1.829 m)   Wt 93.9 kg (207 lb 0.2 oz)   BMI 28.08 kg/m²     PHYSICAL EXAMINATION:    GENERAL: Fraile, in no acute distress, alert and oriented x3.  PSYCH:  Mood and affect appropriate.  SKIN: Skin color, texture, turgor normal, no rashes or lesions on visible skin.  HEAD/FACE:  Normocephalic, atraumatic. Cranial nerves grossly intact.  CV: RRR with palpation of the radial artery.  PULM: CTAB. No evidence of respiratory difficulty, symmetric chest rise.  GI:  Soft    MUSKULOSKELETAL:    EXTREMITIES:   Left Hip Exam (limited ROM and exam 2/2/ pain)  - Log Roll Positive  - FADIR Positive  - Stinchfield Unable to Perform  - Hip Scour Unable to Perform  - GTB Tenderness Positive   -TTP over anterior hip joint. Posterior not palpated  - TTP of the superior knee joint.    Right hip exam:  (+) log roll  (+) fadir  (+) TTP of the right groin    MUSCULOSKELETAL:  Atrophy of the left leg compared to the right  No deformities, edema, or skin discoloration are noted on visible skin. Good capillary refill.     NEURO: Bilateral upper and lower extremity coordination and muscle stretch reflexes are physiologic and symmetric.      NEUROLOGICAL EXAM:  MENTAL STATUS: A x O x 3, good concentration, speech is fluent and goal directed  MEMORY: recent and remote are intact  CN: CN2-12 grossly intact  MOTOR: 5/5 in all muscle groups on the right. HF 3/5 on the left, 4/5 KE, 4/5 KF with pain  DTRs: 3+ intact symmetric patella and 2 + achilles  Sensation:    -yes Loss of sensation in a left lower L-1 and L-2 on the left distribution.  Babinski: absent     Gait: Cane, abnormal. Short stride. Favors left leg

## 2023-09-18 ENCOUNTER — PATIENT MESSAGE (OUTPATIENT)
Dept: PRIMARY CARE CLINIC | Facility: CLINIC | Age: 48
End: 2023-09-18
Payer: MEDICARE

## 2023-09-27 ENCOUNTER — TELEPHONE (OUTPATIENT)
Dept: PAIN MEDICINE | Facility: CLINIC | Age: 48
End: 2023-09-27
Payer: MEDICARE

## 2023-09-27 NOTE — PRE-PROCEDURE INSTRUCTIONS
The following was discussed with pt via phone and sent to pt portal. Pt verbalized understanding.    Dear Irvin ,    You are scheduled for a procedure with Dr. Robbin De La Garza on 9/28/2023.  Your scheduled arrival time is 9:45am.  This arrival time is roughly 1 hour before your anticipated procedure time to allow sufficient time for pre-op..  Please wear comfortable clothes.  Most patients do not need to change into a gown.  Please do not wear a dress.  This procedure will take place at the [facility] at the corner of [facility] and [facility].  It is in the [place] [place] next to [facility].  The address is:    [address] [place] [address].  [place] [address]    After entering the building, you will proceed to the second floor where you can check in with registration. You should take any medications that you routinely take for blood pressure, heart medications, thyroid, cholesterol, etc.     The fasting restrictions are dependent on whether or not you are receiving sedation.  Sedation is not available for all procedures.     Your fasting instructions are as follow:  Oral Sedation. You do not need to fast before this procedure.  You can eat and drink like normal.  You CANNOT drive yourself and must have a .    If you are on blood thinners, you need to follow the anticoagulation instructions that had been discussed previously.  You should only stop the blood thinners if it was approved by your primary care physician or your cardiologist.  In the event that you are not able to stop your blood thinners, a blood thinner was not listed on your medication list, or we were not able to get clearance from your cardiologist, then the procedure may have to be postponed/canceled.     IF you were told to stop your blood thinners, this is how long you should generally hold some of the more common ones.  Remember that stopping blood thinners is only necessary for certain procedures. If  you are unsure of your instructions, please call us.   Aspirin - 5 days  Plavix/Clopidogrel - 7 days  Warfarin / Coumadin - 5 days  Eliquis - 3 days  Pradaxa/Dabigatran - 4 days  Xarelto/Rivaroxaban - 3 days    If you are a diabetic, do not take your medication if you will be fasting, but bring it with you. Please plan on being here for roughly 2 hours.     Please call us if you have been sick (running fever, having any flu-like symptoms) or have been taking antibiotics in the past 2 weeks or had any outpatient procedures other than with us (colonoscopy, endoscopy, OBGYN, dental, etc.). If you have been previously COVID positive, you will need to hold off on your procedure until you are symptom free for 10 days. If you did not have any symptoms, you can have your procedure 10 days from your positive test result.      Thank you,  [facility]

## 2023-09-27 NOTE — TELEPHONE ENCOUNTER
----- Message from Mary Figueroa sent at 9/27/2023  1:49 PM CDT -----  Type:  Patient Returning Call - Procedure    Who Called: Pt  Who Left Message for Patient:  Does the patient know what this is regarding?: Procedure  Would the patient rather a call back or a response via [facility]? Call  Best Call Back Number: [phone]  Additional Information: Pt would like to keep the original appt date.  He does not wish to have appt change to 9/28, tomorrow.

## 2023-09-28 ENCOUNTER — HOSPITAL ENCOUNTER (OUTPATIENT)
Facility: HOSPITAL | Age: 48
Discharge: HOME | End: 2023-09-28
Attending: STUDENT IN AN ORGANIZED HEALTH CARE EDUCATION/TRAINING PROGRAM | Admitting: STUDENT IN AN ORGANIZED HEALTH CARE EDUCATION/TRAINING PROGRAM
Payer: MEDICARE

## 2023-09-28 ENCOUNTER — TELEPHONE (OUTPATIENT)
Dept: PAIN MEDICINE | Facility: CLINIC | Age: 48
End: 2023-09-28
Payer: MEDICARE

## 2023-09-28 VITALS
TEMPERATURE: 98 F | OXYGEN SATURATION: 99 % | SYSTOLIC BLOOD PRESSURE: 172 MMHG | DIASTOLIC BLOOD PRESSURE: 90 MMHG | RESPIRATION RATE: 20 BRPM | HEART RATE: 92 BPM

## 2023-09-28 DIAGNOSIS — M25.559 HIP PAIN: ICD-10-CM

## 2023-09-28 DIAGNOSIS — M25.559 HIP PAIN, UNSPECIFIED LATERALITY: Primary | ICD-10-CM

## 2023-09-28 PROCEDURE — 20610 DRAIN/INJ JOINT/BURSA W/O US: CPT | Mod: RT,NTX | Performed by: STUDENT IN AN ORGANIZED HEALTH CARE EDUCATION/TRAINING PROGRAM

## 2023-09-28 PROCEDURE — 25000003 PHARM REV CODE 250: Performed by: STUDENT IN AN ORGANIZED HEALTH CARE EDUCATION/TRAINING PROGRAM

## 2023-09-28 PROCEDURE — 20610 PR DRAIN/INJECT LARGE JOINT/BURSA: ICD-10-PCS | Mod: RT,,, | Performed by: STUDENT IN AN ORGANIZED HEALTH CARE EDUCATION/TRAINING PROGRAM

## 2023-09-28 PROCEDURE — 77002 PR FLUOROSCOPIC GUIDANCE NEEDLE PLACEMENT: ICD-10-PCS | Mod: 26,,, | Performed by: STUDENT IN AN ORGANIZED HEALTH CARE EDUCATION/TRAINING PROGRAM

## 2023-09-28 PROCEDURE — 20610 DRAIN/INJ JOINT/BURSA W/O US: CPT | Mod: RT,,, | Performed by: STUDENT IN AN ORGANIZED HEALTH CARE EDUCATION/TRAINING PROGRAM

## 2023-09-28 PROCEDURE — 25500020 PHARM REV CODE 255: Performed by: STUDENT IN AN ORGANIZED HEALTH CARE EDUCATION/TRAINING PROGRAM

## 2023-09-28 PROCEDURE — 77002 NEEDLE LOCALIZATION BY XRAY: CPT | Mod: 26,,, | Performed by: STUDENT IN AN ORGANIZED HEALTH CARE EDUCATION/TRAINING PROGRAM

## 2023-09-28 PROCEDURE — 63600175 PHARM REV CODE 636 W HCPCS: Performed by: STUDENT IN AN ORGANIZED HEALTH CARE EDUCATION/TRAINING PROGRAM

## 2023-09-28 RX ORDER — LIDOCAINE HYDROCHLORIDE 20 MG/ML
INJECTION, SOLUTION EPIDURAL; INFILTRATION; INTRACAUDAL; PERINEURAL
Status: DISCONTINUED | OUTPATIENT
Start: 2023-09-28 | End: 2023-09-28 | Stop reason: HOSPADM

## 2023-09-28 RX ORDER — BUPIVACAINE HYDROCHLORIDE 2.5 MG/ML
INJECTION, SOLUTION EPIDURAL; INFILTRATION; INTRACAUDAL
Status: DISCONTINUED | OUTPATIENT
Start: 2023-09-28 | End: 2023-09-28 | Stop reason: HOSPADM

## 2023-09-28 RX ORDER — TRIAMCINOLONE ACETONIDE 40 MG/ML
INJECTION, SUSPENSION INTRA-ARTICULAR; INTRAMUSCULAR
Status: DISCONTINUED | OUTPATIENT
Start: 2023-09-28 | End: 2023-09-28 | Stop reason: HOSPADM

## 2023-09-28 RX ORDER — ALPRAZOLAM 0.5 MG/1
1 TABLET, ORALLY DISINTEGRATING ORAL ONCE AS NEEDED
Status: DISCONTINUED | OUTPATIENT
Start: 2023-09-28 | End: 2023-09-28 | Stop reason: HOSPADM

## 2023-09-28 NOTE — OP NOTE
Hip Joint Injection under Fluoroscopic Guidance    The procedure, risks, benefits, and options were discussed with the patient. There are no contraindications to the procedure. The patent expressed understanding and agreed to the procedure. Informed written consent was obtained prior to the start of the procedure and can be found in the patient's chart.    PATIENT NAME: Irvin Diaz Jr.   MRN: [id number]     DATE OF PROCEDURE: 09/28/2023    PROCEDURE: Right Hip Joint Injection under Fluoroscopic Guidance    PRE-OP DIAGNOSIS: Osteoarthritis of right hip [M16.11]    POST-OP DIAGNOSIS: Osteoarthritis of right hip [M16.11]    PHYSICIAN: Robbin De La Garza DO    ASSISTANTS: none     MEDICATIONS INJECTED: Preservative-free Kenalog 40mg with 5cc of Bupivacine 0.25%     LOCAL ANESTHETIC INJECTED: Xylocaine 1%     SEDATION: Oral Sedation     ESTIMATED BLOOD LOSS: None    COMPLICATIONS: None    TECHNIQUE: Time-out was performed to identify the patient and procedure to be performed. With the patient laying in a supine position, the surgical area was prepped and draped in the usual sterile fashion using ChloraPrep and a fenestrated drape. The area overlying the hip joint was determined under fluoroscopy guidance. Skin anesthesia was achieved by injecting Lidocaine 2% over the injection site. A 22 gauge, 3.5 inch spinal quinke needle was introduced under fluoroscopy until the tip reached the greater trochanter. The tip of the needle was hinged cephalad from the greater trochanter into the joint space.  When the needle tip was in the appropriate position, and there was no blood aspiration, Contrast dye  Omnipaque (300mg/mL) was injected to confirm placement and there was no vascular runoff. 5 mL of the medication mixture listed above was injected slowly. The needles were removed and bleeding was nil. A sterile dressing was applied. No specimens collected. The patient tolerated the procedure well.    The patient was  monitored after the procedure in the recovery area. They were given post-procedure and discharge instructions to follow at home. The patient was discharged in a stable condition.      Robbin Abernathy DO

## 2023-09-28 NOTE — PLAN OF CARE
Discharge instructions reviewed with pt, verbalized understanding, all belongings with pt. ambulated to POV without incident.

## 2023-09-28 NOTE — PLAN OF CARE
Pt in preop bay 13, VSS. Pt denies any open wounds on body or the use of any immunizations or antibiotics in the past 2 weeks. Pt needs an updated H&P, site marking and procedural consents, otherwise ready to roll.

## 2023-09-28 NOTE — TELEPHONE ENCOUNTER
Pt advised to come in and go to the 2nd floor. Procedures are running behind today and they haven't gotten to his slot yet.

## 2023-09-28 NOTE — DISCHARGE INSTRUCTIONS
[facility]  [facility]  [employer] service # [phone]    POST-PROCEDURE INSTRUCTIONS:    Today you had an injection that included a steroid medications.  The steroid may or may not have been mixed with a local anesthetic when it was injected.   If the injection was in the neck, you may feel some pressure, numbness, or slight weakness in the arm after the procedure for a short period of time (this is a normal response), if this persists for longer than 1 day please contact our office or go to the emergency room.  If the injection was in the low back, you may feel some pressure, numbness, or slight weakness in the leg after the procedure for a short period of time (this is a normal response), if this persists for longer than 1 day please contact our office or go to the emergency room.  You may get side effects from the steroid.  This is not uncommon.  Symptoms include: elevated blood sugar, elevated blood pressure, headache, flushing, nausea, insomnia.  These symptoms are transient and will resolve within 1-3 days.  If symptoms last longer than this please contact our office or head to the emergency room.  Steroid medications can take anywhere from 3-14 days to take effect (rarely longer).  You may notice that your pain worsens for a short period of time after the injection, this would not be unusual due to the pressure and trauma from the needle.    If you do not have a follow up appointment scheduled, please contact my office (or the office of the physician who referred you for the procedure) to get a post-procedure follow up scheduled 2-4 weeks after the procedure.  This can be done as a virtual visit if that is more convenient for you.      What you need to do:    Keep a record of your response to the injection you had today.    How much relief did you get?   When did the relief start and how long did it last?  Were you able to decrease the use of any of your pain  medications?  Were you able to increase your level of activity?  How long did the relief last?    What to watch out for:    If you experience any of the following symptoms after your procedure, please notify the messaging service immediately (see above for contact information):   fever (increased oral temperature)   bleeding or swelling at the injection site,    drainage, rash or redness at the injection site    possible signs of infection    increased pain at the injection site   worsening of your usual pain   severe headache   new or worsening numbness    new arm and/or leg weakness, or    changes in bowel and/or bladder function: urinating or defecating on yourself and not knowing that you did it.    PLEASE FOLLOW ALL INSTRUCTIONS CAREFULLY     Do not engage in strenuous activity (e.g., lifting or pushing heavy objects or repeated bending) for 24 hours.     Do not take a bath, swim or use Jacuzzi for 24 hours after procedure. (A shower is fine).   Remove any Band-Aids when you get home.    Use cold/ice, as needed for comfort.  We recommend the use of cold therapy alternating on for 20 minutes, off for 20 minutes.    Do not apply direct heat (heating pad or heat packs) to the injection site for 24 hours.     Resume your usual medications, unless instructed otherwise by your Pain Physician.     If you are on warfarin (Coumadin) or other blood thinner, resume this medication as instructed by your prescribing Physician.    IF AT ANY POINT YOU ARE VERY CONCERNED ABOUT YOUR SYMPTOMS, PLEASE GO TO THE EMERGENCY ROOM.    If you develop worsening pain, weakness, numbness, lose bowel or bladder control (i.e., having an accident where you did not even know you had to go to the bathroom and suddenly noticed you soiled yourself), saddle anesthesia (a loss of sensation restricted to the area of the buttocks, anus and between the legs -- i.e., those parts of your body that would touch a saddle if you were sitting on one) you  need to go immediately to the emergency department for evaluation and treatment.    ----------------------------------------------------------------------------------------------------------------------------------------------------------------  If you received Sedation please read the following instructions:  POST SEDATION INSTRUCTIONS    Today you received intravenous medication (also known as sedation) that was used to help you relax and/or decrease discomfort during your procedure. This medication will be acting in your body for the next 24 hours, so you might feel a little tired or sleepy. This feeling will slowly wear off.   Common side effects associated with these medications include: drowsiness, dizziness, sleepiness, confusion, feeling excited, difficulty remembering things, lack of steadiness with walking or balance, loss of fine muscle control, slowed reflexes, difficulty focusing, and blurred vision.  Some over-the-counter and prescription medications (e.g., muscle relaxants, opioids, mood-altering medications, sedatives/hypnotics, antihistamines) can interact with the intravenous medication you received and cause an increased risk of the side effects listed above in addition to other potentially life threatening side effects. Use extreme caution if you are taking such medications, and consult with your Pain Physician or prescribing physician if you have any questions.  For the next 12-24 hours:    DO NOT--Drive a car, operate machinery or power tools   DO NOT--Drink any alcoholic beverages (not even beer), they may dangerously increase the risk of side effects.    DO NOT--Make any important legal or business decisions or sign important documents.  We advise you to have someone to assist you at home. Move slowly and carefully. Do not make sudden changes in position. Be aware of dizziness or light-headedness and move accordingly.   If you seek medical treatment within 24 hours, let the nurse or doctor  caring for you know that you have received the above medications. If you have any questions or concerns related to your sedation or treatment today please contact us.

## 2023-09-28 NOTE — DISCHARGE SUMMARY
[facility]  Discharge Note  Short Stay    Procedure(s) (LRB):  RT Hip Injection (Right)      OUTCOME: Patient tolerated treatment/procedure well without complication and is now ready for discharge.    DISPOSITION: Home or Self Care    FINAL DIAGNOSIS:  <principal problem not specified>    FOLLOWUP: In clinic    DISCHARGE INSTRUCTIONS:  No discharge procedures on file.     TIME SPENT ON DISCHARGE: 10 minutes

## 2023-10-01 DIAGNOSIS — G93.40 ENCEPHALOPATHY: ICD-10-CM

## 2023-10-01 RX ORDER — RIFAXIMIN 550 MG/1
550 TABLET ORAL 2 TIMES DAILY
Qty: 60 TABLET | Refills: 11 | Status: CANCELLED | OUTPATIENT
Start: 2023-10-01

## 2023-10-02 DIAGNOSIS — G93.40 ENCEPHALOPATHY: ICD-10-CM

## 2023-10-17 RX ORDER — POTASSIUM CHLORIDE 20 MEQ/1
40 TABLET, EXTENDED RELEASE ORAL 2 TIMES DAILY
Qty: 28 TABLET | Refills: 0 | Status: CANCELLED | OUTPATIENT
Start: 2023-10-17 | End: 2023-10-24

## 2023-10-18 ENCOUNTER — PATIENT MESSAGE (OUTPATIENT)
Dept: CARDIOLOGY | Facility: CLINIC | Age: 48
End: 2023-10-18
Payer: MEDICARE

## 2023-12-08 DIAGNOSIS — M87.052 AVASCULAR NECROSIS OF HIP, LEFT: ICD-10-CM

## 2023-12-08 DIAGNOSIS — M25.552 CHRONIC LEFT HIP PAIN: ICD-10-CM

## 2023-12-08 DIAGNOSIS — G89.29 CHRONIC LEFT HIP PAIN: ICD-10-CM

## 2023-12-08 RX ORDER — SILDENAFIL 100 MG/1
100 TABLET, FILM COATED ORAL DAILY PRN
Qty: 30 TABLET | Refills: 5 | Status: SHIPPED | OUTPATIENT
Start: 2023-12-08 | End: 2024-12-07

## 2023-12-08 RX ORDER — TRAMADOL HYDROCHLORIDE 50 MG/1
50 TABLET ORAL 4 TIMES DAILY PRN
Qty: 120 TABLET | Refills: 3 | Status: CANCELLED | OUTPATIENT
Start: 2023-12-08 | End: 2024-04-06

## 2023-12-08 NOTE — TELEPHONE ENCOUNTER
Care Due:                  Date            Visit Type   Department     Provider  --------------------------------------------------------------------------------                                EP -                              PRIMARY      [facility]  Last Visit: 01-      CARE ([facility])   PRIMARY CARE   Hossein De La O  Next Visit: None Scheduled  None         None Found                                                            Last  Test          Frequency    Reason                     Performed    Due Date  --------------------------------------------------------------------------------    Office Visit  12 months..  folic....................  01- 12-    [facility] Embedded Care Due Messages. Reference number: [id number].   12/08/2023 9:14:31 AM CST

## 2023-12-08 NOTE — TELEPHONE ENCOUNTER
He should have 1 refill left.  If the pharmacy is telling him no, then I can refill it, but he does need to see me on the 27th at his scheduled appointment since 3-4 visits a year are necessary for chronic narcotic patients.  Thanks!

## 2023-12-11 DIAGNOSIS — M87.052 AVASCULAR NECROSIS OF HIP, LEFT: ICD-10-CM

## 2023-12-11 DIAGNOSIS — G89.29 CHRONIC LEFT HIP PAIN: Primary | ICD-10-CM

## 2023-12-11 DIAGNOSIS — M25.552 CHRONIC LEFT HIP PAIN: Primary | ICD-10-CM

## 2023-12-11 RX ORDER — TRAMADOL HYDROCHLORIDE 50 MG/1
50 TABLET ORAL 4 TIMES DAILY PRN
Qty: 120 TABLET | Refills: 2 | Status: SHIPPED | OUTPATIENT
Start: 2023-12-14 | End: 2024-03-18

## 2023-12-26 RX ORDER — FUROSEMIDE 20 MG/1
20 TABLET ORAL DAILY
Qty: 30 TABLET | Refills: 11 | OUTPATIENT
Start: 2023-12-26 | End: 2024-12-25

## 2023-12-26 RX ORDER — SPIRONOLACTONE 50 MG/1
50 TABLET, FILM COATED ORAL DAILY
Qty: 30 TABLET | Refills: 11 | OUTPATIENT
Start: 2023-12-26 | End: 2024-12-25

## 2024-01-12 RX ORDER — AMLODIPINE BESYLATE 10 MG/1
10 TABLET ORAL DAILY
Qty: 30 TABLET | Refills: 11 | Status: CANCELLED | OUTPATIENT
Start: 2024-01-12 | End: 2025-01-11

## 2024-01-16 RX ORDER — AMLODIPINE BESYLATE 10 MG/1
10 TABLET ORAL DAILY
Qty: 30 TABLET | Refills: 11 | Status: CANCELLED | OUTPATIENT
Start: 2024-01-12 | End: 2025-01-11

## 2024-03-07 DIAGNOSIS — G89.29 CHRONIC LEFT HIP PAIN: ICD-10-CM

## 2024-03-07 DIAGNOSIS — M25.552 CHRONIC LEFT HIP PAIN: ICD-10-CM

## 2024-03-07 DIAGNOSIS — M87.052 AVASCULAR NECROSIS OF HIP, LEFT: ICD-10-CM

## 2024-03-07 RX ORDER — TRAMADOL HYDROCHLORIDE 50 MG/1
50 TABLET ORAL 4 TIMES DAILY PRN
Qty: 120 TABLET | Refills: 2 | Status: CANCELLED | OUTPATIENT
Start: 2024-03-07 | End: 2024-06-05

## 2024-03-08 DIAGNOSIS — M25.552 CHRONIC LEFT HIP PAIN: ICD-10-CM

## 2024-03-08 DIAGNOSIS — M87.052 AVASCULAR NECROSIS OF HIP, LEFT: ICD-10-CM

## 2024-03-08 DIAGNOSIS — G89.29 CHRONIC LEFT HIP PAIN: ICD-10-CM

## 2024-03-08 RX ORDER — TRAMADOL HYDROCHLORIDE 50 MG/1
50 TABLET ORAL 4 TIMES DAILY PRN
Qty: 120 TABLET | Refills: 2 | OUTPATIENT
Start: 2024-03-08 | End: 2024-06-06

## 2024-03-08 NOTE — TELEPHONE ENCOUNTER
Can you please call him and let him know that I have to see him back in the office to refill his medications?  It has been 6 months since I saw him and he keeps cancelling his appointments.  I can't keep refilling narcotic medications without a follow up.  Please schedule him ASAP. I cannot refill him without seeing him.  Thank you.

## 2024-03-18 ENCOUNTER — OFFICE VISIT (OUTPATIENT)
Dept: PAIN MEDICINE | Facility: CLINIC | Age: 49
End: 2024-03-18
Payer: MEDICARE

## 2024-03-18 ENCOUNTER — LAB VISIT (OUTPATIENT)
Dept: LAB | Facility: HOSPITAL | Age: 49
End: 2024-03-18
Attending: STUDENT IN AN ORGANIZED HEALTH CARE EDUCATION/TRAINING PROGRAM
Payer: MEDICARE

## 2024-03-18 VITALS
DIASTOLIC BLOOD PRESSURE: 98 MMHG | HEIGHT: 72 IN | SYSTOLIC BLOOD PRESSURE: 179 MMHG | HEART RATE: 76 BPM | BODY MASS INDEX: 28.08 KG/M2

## 2024-03-18 DIAGNOSIS — G89.4 CHRONIC PAIN SYNDROME: ICD-10-CM

## 2024-03-18 DIAGNOSIS — F11.90 CHRONIC, CONTINUOUS USE OF OPIOIDS: ICD-10-CM

## 2024-03-18 DIAGNOSIS — M87.052 AVASCULAR NECROSIS OF HIP, LEFT: Primary | ICD-10-CM

## 2024-03-18 DIAGNOSIS — M16.11 PRIMARY OSTEOARTHRITIS OF RIGHT HIP: ICD-10-CM

## 2024-03-18 DIAGNOSIS — M87.052 AVASCULAR NECROSIS OF HIP, LEFT: ICD-10-CM

## 2024-03-18 PROCEDURE — 80307 DRUG TEST PRSMV CHEM ANLYZR: CPT | Mod: NTX | Performed by: STUDENT IN AN ORGANIZED HEALTH CARE EDUCATION/TRAINING PROGRAM

## 2024-03-18 PROCEDURE — 80326 AMPHETAMINES 5 OR MORE: CPT | Mod: TXP | Performed by: STUDENT IN AN ORGANIZED HEALTH CARE EDUCATION/TRAINING PROGRAM

## 2024-03-18 PROCEDURE — 99999 PR PBB SHADOW E&M-EST. PATIENT-LVL III: CPT | Mod: PBBFAC,,, | Performed by: STUDENT IN AN ORGANIZED HEALTH CARE EDUCATION/TRAINING PROGRAM

## 2024-03-18 PROCEDURE — 99213 OFFICE O/P EST LOW 20 MIN: CPT | Mod: PBBFAC,PN | Performed by: STUDENT IN AN ORGANIZED HEALTH CARE EDUCATION/TRAINING PROGRAM

## 2024-03-18 PROCEDURE — 99214 OFFICE O/P EST MOD 30 MIN: CPT | Mod: S$PBB,,, | Performed by: STUDENT IN AN ORGANIZED HEALTH CARE EDUCATION/TRAINING PROGRAM

## 2024-03-18 RX ORDER — TRAMADOL HYDROCHLORIDE 50 MG/1
50 TABLET ORAL 4 TIMES DAILY PRN
Qty: 120 EACH | Refills: 2 | Status: SHIPPED | OUTPATIENT
Start: 2024-03-18 | End: 2024-06-29

## 2024-03-18 NOTE — PROGRESS NOTES
Chronic Pain - f/u    Referring Physician: No ref. provider found    Date: 03/18/2024     Re: Irvin Diaz Jr.  MR#: [id number]  YOB: 1975  Age: 48 y.o.    Chief Complaint: hip pain  No chief complaint on file.    **This note is dictated using the M*Modal Fluency Direct word recognition program. There are word recognition mistakes that are occasionally missed on review.**    ASSESSMENT: 48 y.o. year old male with left hip pain, consistent with     No diagnosis found.    PLAN:     Avascular necrosis of the left hip   -s/p hip block without significant pain relief and complicated by Hematoma.   -tolerating oxycodone 5 mg BID PRN (he thinks his lucid dreams were due to EtOH withdrawal). No more lucid dreams. Switch back to tramadol  -switched back to tramadol because he has been doing better and does not think that he needs the oxycodone anymore.  -refill Tramadol QID. This has been working for him. Helps him function. Refill x3 months  - Medrol dose pack, helped while taking, but not sustained.  -not surgical candidate  -Not a candidate for further procedures due to bleeding risk  - Plts improved to ~110.  -buprenorphine likely not an option 2/2 to his ESLD and liver transplant status    Right hip pain  -XR Right hip given hx of avascular necrosis in the left hip. This showed moderate OA in the hip  9/28/23 - right hip injection - >50% relief for 6 months  -recommended against any repeat due to risks unless the pain becomes unbearable.    Allodynia  -stopped Nortriptyline to 50 mg nightly. Helps him sleep, but made him nauseous    Thorombocytopenia 2/2 cirrhosis  -platelets are 92 on 5/26/22, 82 on 9/7/22, 74 on 10/25/22, 110 on 5/18/23  -Avanos recommended above 50, but he had hematoma, so will not proceed.    Chronic Opioid Use  - UDS from October and September did not show opioids but he states he was decreasing use at that time.  -no UDS today, He had a drug screen 05/2023 which was  appropriate.  -new UDS today.    Cirrhosis 2/2 past EtOH. He states he is not drinking anymore.  -following with hepatology  -possible liver transplant?  -working with psychiatry for EtOH.    Kidney disease  -GFR 54    - RTC before 6/16/24  - Counseled patient regarding the importance of  activity modification.    The above plan and management options were discussed at length with patient. Patient is in agreement with the above and verbalized understanding. It will be communicated with the referring physician via electronic record, fax, or mail.  Lab/study reports reviewed were important and necessary because subsequent medical and treatment recommendations required review of the above lab/study reports. Images viewed/reviewed above were important and necessary because subsequent medical and treatment recommendations required review of the reviewed image(s).     Electronically signed by:  Robbin De La Garza DO  03/18/2024    =========================================================================================================    SUBJECTIVE:    Interval History 3/18/2024:   Irvin Diaz Jr. is a 48 y.o. male presents to the clinic for follow up.  Since last visit the pain has has worsened. He has been having to travel to [place] a lot recently due to his son-in-law getting in to a MVA and helping out. His right hip has been bothering him more recently    The pain is located in the groin area and radiates to the right hip, left knee and ankles .  The pain is described as aching    At BEST  8/10   At WORST  10/10 on the WORST day.    On average pain is rated as 8/10.   Today the pain is rated as 8/10  Symptoms interfere with daily activity and sleeping.   Exacerbating factors: Sitting, Standing, Bending, and Flexing.    Mitigating factors medications.     Current pain medications: Tramadol 50mg QID  Failed Pain Medications: oxycodone, tramadol, gabapentin, cannot take NSAIDs due to gastric bleeding, cannot  take tylenol due to liver failure.     Pain procedures:  6/24/22 - left hip block - no relief. C/b hematoma formation  10/10/23 - Right hip injection - oral sed - no AC - >50% @ 6m    Interval History 9/13/2023:   Irvin Diaz Jr. is a 48 y.o. male presents to the clinic for follow up.  Since last visit the pain has has worsened.    The pain is located in the groin, left knee, and both ankles area and radiates to the knee and ankle .  The pain is described as aching    At BEST  8/10   At WORST  10/10 on the WORST day.    On average pain is rated as 8/10.   Today the pain is rated as 8/10  Symptoms interfere with daily activity and sleeping.   Exacerbating factors: Sitting, Standing, Bending, Walking, and Flexing.    Mitigating factors medications.     Interval History 6/21/2023:   Irvin Diaz Jr. is a 48 y.o. male presents to the clinic for follow up.  Since last visit the pain has has improved in some ways and worsened in others. The right hip has been bothering him more now. States that his right leg is clicking.     The pain is located in the left hip area and radiates to the right hip and down to his left knee .  The pain is described as aching and throbbing    At BEST  7/10   At WORST  9/10 on the WORST day.    On average pain is rated as 8/10.   Today the pain is rated as 8/10  Symptoms interfere with daily activity.   Exacerbating factors: Bending.    Mitigating factors medications (tramadol not oxycodone).     Interval History 1/25/2023:     Irvin Diaz Jr. is a 48 y.o. male presents to the clinic for follow up.  Since last visit the pain has has moderately improved. Reports that he went on a cruise and noticed his pain was doing better.  He has stopped using the cane because it has been better.  He went back to tramadol because he did not think he required the oxycodone. Pain 8/10 today. Feels like a tolerable 8.    Interval History 10/31/2022:     Irvin Diaz Jr. is a 48 y.o.  male presents to the clinic for follow up.  Since last visit the pain has has significantly worsened.    The pain is located in the L hip area and radiates to the L foot .  The pain is described as aching, dull, shooting, stabbing, tight band, and tingling    At BEST  8/10   At WORST  10/10 on the WORST day.    On average pain is rated as 7/10.   Today the pain is rated as 8/10  Symptoms interfere with daily activity, sleeping, and work.   Exacerbating factors: Standing, Bending, Coughing/Sneezing, Walking, and Getting out of bed/chair.    Mitigating factors nothing and medications.     Interval History 9/7/2022:     Irvin Diaz Jr. is a 48 y.o. male presents to the clinic for follow up.  Since last visit the pain has has worsened. He is now using a walker full time for the last 2 weeks. In a wheelchair today. He is unable to describe if his limited mobility is due to just pain or maybe a component of weakness.     The pain is located in the L hip area and radiates to the L foot .  The pain is described as aching, shooting, and stabbing    At BEST  7/10   At WORST  10/10 on the WORST day.    On average pain is rated as 8/10.   Today the pain is rated as 10/10  Symptoms interfere with daily activity, sleeping, and work.   Exacerbating factors: Standing, Laying, Bending, Walking, Lifting, and Getting out of bed/chair.    Mitigating factors none.     Interval History 8/2/2022:     Irvin Diaz Jr. is a 48 y.o. male presents to the clinic for follow up.  Since last visit the pain has is unchanged.  He stopped the Oxycodone because it gave severe, lucid dreams.  Ran out of tramadol and has not been taking anything for his pain.    The pain is located in the left hip area and radiates to the left knee/ groin .  The pain is described as aching, shooting and stabbing    At BEST  8/10   At WORST  10/10 on the WORST day.    On average pain is rated as 8/10.   Today the pain is rated as 8/10  Symptoms interfere  with daily activity, sleeping and work.   Exacerbating factors: Standing, Walking and Getting out of bed/chair.    Mitigating factors nothing, heat, ice, medications and rest.     Interval History 7/5/2022:     Irvin Diaz Jr. is a 48 y.o. male presents to the clinic for follow up.  Since last visit the pain has is unchanged.  He is s/p hip block that was complicated by hematoma in the pectineus muscle.  His Hgb has dropped, although the hematoma is resolving.  Recommended that patient go to ED as recommended by transplant.    The pain is located in the left hip area and radiates to the left leg/ groin  .  The pain is described as aching, shooting and stabbing    At BEST  8/10   At WORST  10/10 on the WORST day.    On average pain is rated as 8/10.   Today the pain is rated as 8/10  Symptoms interfere with daily activity, sleeping and work.   Exacerbating factors: Standing, Walking and Getting out of bed/chair.    Mitigating factors nothing, heat, ice, medications and rest.     Interval History 5/30/2022:     Irvin Diaz Jr. is a 48 y.o. male presents to the clinic for follow up.  Since last visit the pain has is unchanged. He missed his original procedure date due to not checking his voicemail and transportation issues. He is still interested in the procedure.  WE had a long talk about bleeding risk with his low platelets and high INR.    The pain is located in the left hip area and radiates to the left leg/groin .  The pain is described as aching, shooting and stabbing    At BEST  8/10   At WORST  10/10 on the WORST day.    On average pain is rated as 8/10.   Today the pain is rated as 8/10  Symptoms interfere with daily activity, sleeping and work.   Exacerbating factors: walking.    Mitigating factors nothing.     Initial Hx:  Irvin Diaz Jr. is a 48 y.o. male presents to the clinic for the evaluation of left hip pain. The pain started 3 years ago following fall and symptoms have been  worsening. The patient states used to get hip injections of the left hip.  He is unable to walk without a cane.  He states that he was told after imaging that he had a fracture in 2019.  He had an injection (and aspiration) in December/january and it helps the pain for about 3 weeks.  After the injections he still needs the pain but it helps.  The ortho physician at [facility]    CT note about the hip:  There is collapse of the left femoral head superior portion with bone-on-bone as well as underlying femoral sclerotic changes suggesting AVN with severe secondary degenerative changes of the acetabulum and the left hip effusion stable since prior exam.     The patient says that he has seen a ortho surgeon in the past and that they are unable to do surgery due to his liver failure.    Pain Description:    The pain is located in the left hip area and radiates to the left leg/ groin area .    At BEST  8/10   At WORST  10/10 on the WORST day.    On average pain is rated as 8/10.   Today the pain is rated as 8/10  The pain is continuous.  The pain is described as aching, shooting and throbbing    Symptoms interfere with daily activity, sleeping and work.   Exacerbating factors: Standing, Laying, Bending, Walking, Night Time, Morning, Flexing, Lifting and Getting out of bed/chair.    Mitigating factors laying down and medications.   He reports 5 hours of sleep per night.    Physical Therapy/Home Exercise: No, not currently in physical therapy or home exercise program    Current Pain Medications:    - none    Failed Pain Medications:    - cannot take NSAIDs due to gastric bleeding, cannot take tylenol due to liver failure.     Pain Treatment Therapies:    Pain procedures: hip injections  Physical Therapy: physical therapy made things worse  Chiropractor: none  Acupuncture: none  TENS unit: none  Spinal decompression: none  Joint replacement: none    Patient denies urinary incontinence, bowel incontinence and  loss of sensations. (+) weakness in the left leg  Patient denies any suicidal or homicidal ideations     report:  Reviewed and consistent with medication use as prescribed.    Imaging:   XR abdomen 03/2022:  Please note the hemidiaphragms are not included in their entirety.  Multiple surgical clips and presumed anastomotic suture line project over the right abdomen.  There are a few mildly prominent loops of gas-filled small bowel loops present measuring up to 3.3 cm in the left abdomen.  Limited evaluation of free intraperitoneal air to patient positioning/technique.  Calcifications project over the pelvis, likely phleboliths.  Osseous structures demonstrate significant degenerative change of the left hip with chronic appearing deformity/collapse of the left femoral head.    CT abdomen 03/2022:  Mild circumferential wall thickening involving the proximal colon extending from the ileocolic anastomosis at the hepatic flexure through around the level of the splenic flexure suggests inflammatory or infectious colitis.  No convincing transmural inflammation is seen.     Postoperative changes of proximal colectomy and scattered mild diverticulosis of the more distal colon.  No convincing diverticulitis, bowel obstruction, free air or abscess.     Findings of cirrhosis and mild abdominal ascites as described essentially stable.     Left hip findings suggesting AVN as described.     This report was flagged in Epic as abnormal.     No other significant or acute findings.       Past Medical History:   Diagnosis Date    Alcohol withdrawal 5/1/2022    Alcoholic cirrhosis of liver     Alcoholic ketoacidosis 6/6/2021    Arthritis     ATN (acute tubular necrosis)     CHF (congestive heart failure)     Diverticulitis 01/2020    Esophageal varices     GERD (gastroesophageal reflux disease)     GI bleed     Hip arthritis     Left    Hypertension     Liver cirrhosis     Macrocytic anemia     Pulmonary embolism 08/2018     Unprovoked DVT.  Stop Coumadin due to GIB    Thrombocytopenia      Past Surgical History:   Procedure Laterality Date    ANKLE SURGERY      BLOCK, NERVE, PERIPHERAL Left 06/24/2022    Procedure: Left Hip femoral-obturator accessory nerve block;  Surgeon: Robbin De La Garza DO;  Location: [facility] OR;  Service: Pain Management;  Laterality: Left;    COLON SURGERY  2007    COLONOSCOPY  09/05/2019    [facility]    COLONOSCOPY N/A 02/17/2021    Procedure: COLONOSCOPY;  Surgeon: Renea Billingsley MD;  Location: [facility];  Service: Endoscopy;  Laterality: N/A;    COLONOSCOPY N/A 2/13/2023    Procedure: COLONOSCOPY;  Surgeon: Rudy Orellana MD;  Location: [facility] ([facility]);  Service: Endoscopy;  Laterality: N/A;  cirrhosis-labs done on 1/11/23  instr portal-GT  No answer for precall- [place]    COLONOSCOPY N/A 3/10/2023    Procedure: COLONOSCOPY;  Surgeon: Rudy Orellana MD;  [facility]  Service: Endoscopy;  Laterality: N/A;  inst via poral per pt request    COLONOSCOPY W/ BIOPSIES  02/17/2021    ESOPHAGOGASTRODUODENOSCOPY N/A 07/26/2019    Procedure: EGD (ESOPHAGOGASTRODUODENOSCOPY);  Surgeon: Brian Trivedi MD;  Location: [facility];  Service: Endoscopy;  Laterality: N/A;    ESOPHAGOGASTRODUODENOSCOPY N/A 04/08/2020    Procedure: EGD (ESOPHAGOGASTRODUODENOSCOPY);  Surgeon: Donn Acuna MD;  Location: [facility];  Service: Endoscopy;  Laterality: N/A;    ESOPHAGOGASTRODUODENOSCOPY N/A 01/03/2021    Procedure: EGD (ESOPHAGOGASTRODUODENOSCOPY)- coffee ground emesis, hx varices;  Surgeon: Steve Chairez MD;  Location: [facility];  Service: Endoscopy;  Laterality: N/A;    ESOPHAGOGASTRODUODENOSCOPY N/A 05/03/2021    Procedure: EGD (ESOPHAGOGASTRODUODENOSCOPY);  Surgeon: Jeanmarie Ngo MD;  Location: [facility];  Service: Endoscopy;  Laterality: N/A;    ESOPHAGOGASTRODUODENOSCOPY N/A 07/19/2021    Procedure: ESOPHAGOGASTRODUODENOSCOPY (EGD);  Surgeon: Claudio Medeiros MD;  [facility];  Service:  Endoscopy;  Laterality: N/A;    ESOPHAGOGASTRODUODENOSCOPY  2021    ESOPHAGOGASTRODUODENOSCOPY N/A 2021    Procedure: EGD (ESOPHAGOGASTRODUODENOSCOPY);  Surgeon: Ajay Jackson MD;  Location: [facility];  Service: Endoscopy;  Laterality: N/A;    ESOPHAGOGASTRODUODENOSCOPY N/A 2022    Procedure: EGD (ESOPHAGOGASTRODUODENOSCOPY);  Surgeon: Brian Trivedi MD;  Location: [facility];  Service: Endoscopy;  Laterality: N/A;    ESOPHAGOGASTRODUODENOSCOPY N/A 2022    Procedure: EGD (ESOPHAGOGASTRODUODENOSCOPY);  Surgeon: Ajay Jackson MD;  Location: [facility];  Service: Endoscopy;  Laterality: N/A;    ESOPHAGOGASTRODUODENOSCOPY N/A 2022    Procedure: EGD (ESOPHAGOGASTRODUODENOSCOPY);  Surgeon: Edouard Maynard MD;  Location: [address]);  Service: Endoscopy;  Laterality: N/A;    ESOPHAGOGASTRODUODENOSCOPY N/A 2023    Procedure: EGD (ESOPHAGOGASTRODUODENOSCOPY);  Surgeon: Caesar Dickens MD;  Location[facility];  Service: Endoscopy;  Laterality: N/A;    HERNIA REPAIR Left     Inguinal    INJECTION OF JOINT Right 2023    Procedure: RT Hip Injection;  Surgeon: Robbin De La Garza DO;  Location: [facility];  Service: Pain Management;  Laterality: Right;  oral - 20 mins    UPPER GASTROINTESTINAL ENDOSCOPY N/A 2022     Social History     Socioeconomic History    Marital status:    Tobacco Use    Smoking status: Former     Current packs/day: 0.00     Types: Cigarettes     Quit date: 2000     Years since quittin.2    Smokeless tobacco: Never    Tobacco comments:     quit 20 years ago   Substance and Sexual Activity    Alcohol use: Not Currently     Comment: Quit drinking 21    Drug use: Not Currently    Sexual activity: Yes     Comment: occ     Social Determinants of Health     Financial Resource Strain: Low Risk  (2022)    Overall Financial Resource Strain (CARDIA)     Difficulty of Paying Living Expenses: Not very hard   Food  Insecurity: No Food Insecurity (11/29/2022)    Hunger Vital Sign     Worried About Running Out of Food in the Last Year: Never true     Ran Out of Food in the Last Year: Never true   Transportation Needs: No Transportation Needs (11/29/2022)    PRAPARE - Transportation     Lack of Transportation (Medical): No     Lack of Transportation (Non-Medical): No   Physical Activity: Inactive (11/29/2022)    Exercise Vital Sign     Days of Exercise per Week: 0 days     Minutes of Exercise per Session: 0 min   Stress: No Stress Concern Present (11/29/2022)    [place] of Occupational Health - Occupational Stress Questionnaire     Feeling of Stress : Not at all   Social Connections: Moderately Integrated (11/29/2022)    Social Connection and Isolation Panel [NHANES]     Frequency of Communication with Friends and Family: More than three times a week     Frequency of Social Gatherings with Friends and Family: More than three times a week     Attends [employer] Services: Never     Active Member of Clubs or Organizations: Yes     Attends Club or Organization Meetings: Never     Marital Status:    Housing Stability: Low Risk  (11/29/2022)    Housing Stability Vital Sign     Unable to Pay for Housing in the Last Year: No     Number of Places Lived in the Last Year: 1     Unstable Housing in the Last Year: No     Family History   Problem Relation Age of Onset    Hypertension Mother     Breast cancer Mother     Hypertension Father     Prostate cancer Father     Bladder Cancer Father        Review of patient's allergies indicates:   Allergen Reactions    Ciprofloxacin hcl Hallucinations    Meperidine Hives     Pt medicated w/multiple medications (demerol, protonix, and zofran)  w/i 10 mins time frame prior to developing localized hives near IV site that med was administered. Pt reports he has/takes all other medications on daily basis.     Morphine Itching    Nsaids (non-steroidal anti-inflammatory drug)      Kidney  Disease    Tylenol [acetaminophen]      Hx of liver disease       Current Outpatient Medications   Medication Sig    acamprosate (CAMPRAL) 333 mg tablet Take 2 tablets (666 mg total) by mouth 3 (three) times daily.    amLODIPine (NORVASC) 10 MG tablet Take 1 tablet (10 mg total) by mouth once daily.    augmented betamethasone dipropionate (DIPROLENE-AF) 0.05 % cream Apply topically 2 (two) times daily. (Patient taking differently: Apply 1 application  topically 2 (two) times daily.)    calcitRIOL (ROCALTROL) 0.25 MCG Cap Take 1 capsule (0.25 mcg total) by mouth once daily.    carvediloL (COREG) 12.5 MG tablet Take 1 tablet (12.5 mg total) by mouth 2 (two) times daily with meals.    clindamycin-benzoyl peroxide (BENZACLIN) gel Apply topically 2 (two) times daily. (Patient taking differently: Apply 1 application  topically 2 (two) times daily.)    ferrous gluconate (FERGON) 240 (27 FE) MG tablet Take 2 tablets (480 mg total) by mouth 2 (two) times daily with meals.    folic acid (FOLVITE) 1 MG tablet Take 1 tablet (1 mg total) by mouth once daily.    furosemide (LASIX) 20 MG tablet Take 1 tablet (20 mg total) by mouth once daily.    pantoprazole (PROTONIX) 40 MG tablet Take 2 tablets (80 mg total) by mouth 2 (two) times daily.    rifAXIMin (XIFAXAN) 550 mg Tab Take 1 tablet (550 mg total) by mouth 2 (two) times daily.    sildenafiL (VIAGRA) 100 MG tablet Take 1 tablet (100 mg total) by mouth daily as needed for Erectile Dysfunction.    spironolactone (ALDACTONE) 50 MG tablet Take 1 tablet (50 mg total) by mouth once daily.    thiamine 100 MG tablet Take 1 tablet (100 mg total) by mouth once daily.    traMADoL (ULTRAM) 50 mg tablet Take 1 tablet (50 mg total) by mouth 4 (four) times daily as needed for Pain.     No current facility-administered medications for this visit.       REVIEW OF SYSTEMS:    GENERAL:  No weight loss, malaise or fevers.   HEENT:   No recent changes in vision or hearing   NECK:  Negative for  lumps, no difficulty with swallowing.  RESPIRATORY:  Negative for cough, wheezing or shortness of breath, patient denies any recent URI.  CARDIOVASCULAR:  Negative for chest pain, leg swelling or palpitations.  GI:  Negative for abdominal discomfort, blood in stools or black stools or change in bowel habits.  MUSCULOSKELETAL:  See HPI.  SKIN:  Negative for lesions, rash, and itching. + skin itching  PSYCH:  No mood disorder or recent psychosocial stressors.  Patients sleep is not disturbed secondary to pain.  HEMATOLOGY/LYMPHOLOGY:  Negative for prolonged bleeding, bruising easily or swollen nodes.  Patient is not currently taking any anti-coagulants  NEURO:   No history of headaches, syncope, paralysis, seizures or tremors.  All other reviewed and negative other than HPI.    OBJECTIVE:    There were no vitals taken for this visit.    PHYSICAL EXAMINATION:    GENERAL: Fraile, in no acute distress, alert and oriented x3.  PSYCH:  Mood and affect appropriate.  SKIN: Skin color, texture, turgor normal, no rashes or lesions on visible skin.  HEAD/FACE:  Normocephalic, atraumatic. Cranial nerves grossly intact.  CV: RRR with palpation of the radial artery.  PULM: CTAB. No evidence of respiratory difficulty, symmetric chest rise.  GI:  Soft    MUSKULOSKELETAL:    EXTREMITIES:   Left Hip Exam (limited ROM and exam 2/2/ pain)  - Log Roll Positive  - FADIR Positive  - Stinchfield Unable to Perform  - Hip Scour Unable to Perform  - GTB Tenderness Positive   -TTP over anterior hip joint. Posterior not palpated  - TTP of the superior knee joint.    Right hip exam:  (+) log roll  (+) fadir  (+) TTP of the right groin    MUSCULOSKELETAL:  Atrophy of the left leg compared to the right  No deformities, edema, or skin discoloration are noted on visible skin. Good capillary refill.     NEURO: Bilateral upper and lower extremity coordination and muscle stretch reflexes are physiologic and symmetric.      NEUROLOGICAL EXAM:  MENTAL  STATUS: A x O x 3, good concentration, speech is fluent and goal directed  MEMORY: recent and remote are intact  CN: CN2-12 grossly intact  MOTOR: 5/5 in all muscle groups on the right. HF 3/5 on the left, 4/5 KE, 4/5 KF with pain  DTRs: 3+ intact symmetric patella and 2 + achilles  Sensation:    -yes Loss of sensation in a left lower L-1 and L-2 on the left distribution.  Babinski: absent     Gait: Cane, abnormal. Short stride. Favors left leg

## 2024-03-22 ENCOUNTER — PATIENT MESSAGE (OUTPATIENT)
Dept: PAIN MEDICINE | Facility: CLINIC | Age: 49
End: 2024-03-22
Payer: MEDICARE

## 2024-03-22 LAB
6MAM UR QL: NOT DETECTED
7AMINOCLONAZEPAM UR QL: NOT DETECTED
A-OH ALPRAZ UR QL: NOT DETECTED
ALPHA-OH-MIDAZOLAM: NOT DETECTED
ALPRAZ UR QL: NOT DETECTED
AMPHET UR QL SCN: NOT DETECTED
ANNOTATION COMMENT IMP: NORMAL
BARBITURATES UR QL: NEGATIVE
BUPRENORPHINE UR QL: NOT DETECTED
BZE UR QL: NEGATIVE
CARBOXYTHC UR QL: NORMAL
CARISOPRODOL UR QL: NEGATIVE
CLONAZEPAM UR QL: NOT DETECTED
CODEINE UR QL: NOT DETECTED
CREAT UR-MCNC: 234.9 MG/DL (ref 20–400)
DIAZEPAM UR QL: NOT DETECTED
ETHYL GLUCURONIDE UR QL: NORMAL
FENTANYL UR QL: NOT DETECTED
GABAPENTIN: NOT DETECTED
HYDROCODONE UR QL: NOT DETECTED
HYDROMORPHONE UR QL: NOT DETECTED
LORAZEPAM UR QL: NOT DETECTED
MDA UR QL: NOT DETECTED
MDEA UR QL: NOT DETECTED
MDMA UR QL: NOT DETECTED
ME-PHENIDATE UR QL: NOT DETECTED
METHADONE UR QL: NEGATIVE
METHAMPHET UR QL: NOT DETECTED
MIDAZOLAM UR QL SCN: NOT DETECTED
MORPHINE UR QL: NOT DETECTED
NALOXONE: NOT DETECTED
NORBUPRENORPHINE UR QL CFM: NOT DETECTED
NORDIAZEPAM UR QL: NOT DETECTED
NORFENTANYL UR QL: NOT DETECTED
NORHYDROCODONE UR QL CFM: NOT DETECTED
NORMEPERIDINE UR QL CFM: NOT DETECTED
NOROXYCODONE UR QL CFM: NOT DETECTED
NOROXYMORPHONE UR QL SCN: NOT DETECTED
OXAZEPAM UR QL: NOT DETECTED
OXYCODONE UR QL: NOT DETECTED
OXYMORPHONE UR QL: NOT DETECTED
PATHOLOGY STUDY: NORMAL
PCP UR QL: NEGATIVE
PHENTERMINE UR QL: NOT DETECTED
PREGABALIN: NOT DETECTED
SERVICE CMNT-IMP: NORMAL
TAPENTADOL UR QL SCN: NOT DETECTED
TAPENTADOL UR QL SCN: NOT DETECTED
TEMAZEPAM UR QL: NOT DETECTED
TRAMADOL UR QL: NEGATIVE
ZOLPIDEM METABOLITE: NOT DETECTED
ZOLPIDEM UR QL: NOT DETECTED

## 2024-03-27 ENCOUNTER — HOSPITAL ENCOUNTER (INPATIENT)
Facility: HOSPITAL | Age: 49
LOS: 5 days | Discharge: HOME | DRG: 151 | End: 2024-04-01
Attending: EMERGENCY MEDICINE | Admitting: INTERNAL MEDICINE
Payer: MEDICARE

## 2024-03-27 DIAGNOSIS — R07.9 CHEST PAIN: ICD-10-CM

## 2024-03-27 DIAGNOSIS — I49.3 PVC (PREMATURE VENTRICULAR CONTRACTION): ICD-10-CM

## 2024-03-27 DIAGNOSIS — K92.2 GIB (GASTROINTESTINAL BLEEDING): ICD-10-CM

## 2024-03-27 DIAGNOSIS — I47.20 VENTRICULAR TACHYCARDIA: Primary | ICD-10-CM

## 2024-03-27 DIAGNOSIS — R04.0 RIGHT-SIDED EPISTAXIS: ICD-10-CM

## 2024-03-27 DIAGNOSIS — I42.6 ALCOHOLIC CARDIOMYOPATHY: ICD-10-CM

## 2024-03-27 DIAGNOSIS — K92.2 GASTROINTESTINAL HEMORRHAGE, UNSPECIFIED GASTROINTESTINAL HEMORRHAGE TYPE: ICD-10-CM

## 2024-03-27 PROBLEM — I10 HTN (HYPERTENSION): Status: ACTIVE | Noted: 2024-03-27

## 2024-03-27 LAB
ABO + RH BLD: NORMAL
ALBUMIN SERPL BCP-MCNC: 3.8 G/DL (ref 3.5–5.2)
ALP SERPL-CCNC: 102 U/L (ref 55–135)
ALT SERPL W/O P-5'-P-CCNC: 26 U/L (ref 10–44)
ANION GAP SERPL CALC-SCNC: 11 MMOL/L (ref 8–16)
APTT PPP: 29.3 SEC (ref 21–32)
AST SERPL-CCNC: 47 U/L (ref 10–40)
BACTERIA #/AREA URNS HPF: ABNORMAL /HPF
BASOPHILS # BLD AUTO: 0.03 K/UL (ref 0–0.2)
BASOPHILS NFR BLD: 0.5 % (ref 0–1.9)
BILIRUB SERPL-MCNC: 3.9 MG/DL (ref 0.1–1)
BILIRUB UR QL STRIP: NEGATIVE
BLD GP AB SCN CELLS X3 SERPL QL: NORMAL
BUN SERPL-MCNC: 31 MG/DL (ref 6–20)
CALCIUM SERPL-MCNC: 8.9 MG/DL (ref 8.7–10.5)
CHLORIDE SERPL-SCNC: 104 MMOL/L (ref 95–110)
CLARITY UR: CLEAR
CO2 SERPL-SCNC: 25 MMOL/L (ref 23–29)
COLOR UR: YELLOW
CREAT SERPL-MCNC: 1.6 MG/DL (ref 0.5–1.4)
DIFFERENTIAL METHOD BLD: ABNORMAL
EOSINOPHIL # BLD AUTO: 0 K/UL (ref 0–0.5)
EOSINOPHIL NFR BLD: 0.3 % (ref 0–8)
ERYTHROCYTE [DISTWIDTH] IN BLOOD BY AUTOMATED COUNT: 17.2 % (ref 11.5–14.5)
EST. GFR  (NO RACE VARIABLE): 52.5 ML/MIN/1.73 M^2
ETHANOL SERPL-MCNC: <10 MG/DL
GLUCOSE SERPL-MCNC: 108 MG/DL (ref 70–110)
GLUCOSE UR QL STRIP: NEGATIVE
HCT VFR BLD AUTO: 36.2 % (ref 40–54)
HGB BLD-MCNC: 10.7 G/DL (ref 14–18)
HGB BLD-MCNC: 11.9 G/DL (ref 14–18)
HGB UR QL STRIP: ABNORMAL
HYALINE CASTS #/AREA URNS LPF: 1 /LPF
IMM GRANULOCYTES # BLD AUTO: 0.07 K/UL (ref 0–0.04)
IMM GRANULOCYTES NFR BLD AUTO: 1.1 % (ref 0–0.5)
INR PPP: 1.2 (ref 0.8–1.2)
KETONES UR QL STRIP: ABNORMAL
LEUKOCYTE ESTERASE UR QL STRIP: ABNORMAL
LIPASE SERPL-CCNC: 67 U/L (ref 4–60)
LYMPHOCYTES # BLD AUTO: 1.6 K/UL (ref 1–4.8)
LYMPHOCYTES NFR BLD: 25.2 % (ref 18–48)
MCH RBC QN AUTO: 34.8 PG (ref 27–31)
MCHC RBC AUTO-ENTMCNC: 32.9 G/DL (ref 32–36)
MCV RBC AUTO: 106 FL (ref 82–98)
MICROSCOPIC COMMENT: ABNORMAL
MONOCYTES # BLD AUTO: 0.4 K/UL (ref 0.3–1)
MONOCYTES NFR BLD: 6.8 % (ref 4–15)
NEUTROPHILS # BLD AUTO: 4.1 K/UL (ref 1.8–7.7)
NEUTROPHILS NFR BLD: 66.1 % (ref 38–73)
NITRITE UR QL STRIP: NEGATIVE
NRBC BLD-RTO: 0 /100 WBC
OB PNL STL: POSITIVE
PH UR STRIP: 6 [PH] (ref 5–8)
PLATELET # BLD AUTO: 69 K/UL (ref 150–450)
PMV BLD AUTO: 10.8 FL (ref 9.2–12.9)
POTASSIUM SERPL-SCNC: 4.1 MMOL/L (ref 3.5–5.1)
PROT SERPL-MCNC: 8.6 G/DL (ref 6–8.4)
PROT UR QL STRIP: ABNORMAL
PROTHROMBIN TIME: 13.1 SEC (ref 9–12.5)
RBC # BLD AUTO: 3.42 M/UL (ref 4.6–6.2)
RBC #/AREA URNS HPF: 2 /HPF (ref 0–4)
SODIUM SERPL-SCNC: 140 MMOL/L (ref 136–145)
SP GR UR STRIP: 1.02 (ref 1–1.03)
SPECIMEN OUTDATE: NORMAL
SQUAMOUS #/AREA URNS HPF: 1 /HPF
URN SPEC COLLECT METH UR: ABNORMAL
UROBILINOGEN UR STRIP-ACNC: NEGATIVE EU/DL
WBC # BLD AUTO: 6.16 K/UL (ref 3.9–12.7)
WBC #/AREA URNS HPF: 19 /HPF (ref 0–5)

## 2024-03-27 PROCEDURE — 81001 URINALYSIS AUTO W/SCOPE: CPT

## 2024-03-27 PROCEDURE — 96375 TX/PRO/DX INJ NEW DRUG ADDON: CPT

## 2024-03-27 PROCEDURE — 86850 RBC ANTIBODY SCREEN: CPT

## 2024-03-27 PROCEDURE — 83690 ASSAY OF LIPASE: CPT | Performed by: EMERGENCY MEDICINE

## 2024-03-27 PROCEDURE — 25000003 PHARM REV CODE 250

## 2024-03-27 PROCEDURE — 93010 ELECTROCARDIOGRAM REPORT: CPT | Mod: ,,, | Performed by: GENERAL PRACTICE

## 2024-03-27 PROCEDURE — 85025 COMPLETE CBC W/AUTO DIFF WBC: CPT | Performed by: EMERGENCY MEDICINE

## 2024-03-27 PROCEDURE — 96376 TX/PRO/DX INJ SAME DRUG ADON: CPT

## 2024-03-27 PROCEDURE — C9113 INJ PANTOPRAZOLE SODIUM, VIA: HCPCS | Performed by: EMERGENCY MEDICINE

## 2024-03-27 PROCEDURE — G0378 HOSPITAL OBSERVATION PER HR: HCPCS

## 2024-03-27 PROCEDURE — 25000003 PHARM REV CODE 250: Performed by: EMERGENCY MEDICINE

## 2024-03-27 PROCEDURE — 63600175 PHARM REV CODE 636 W HCPCS

## 2024-03-27 PROCEDURE — 12000002 HC ACUTE/MED SURGE SEMI-PRIVATE ROOM

## 2024-03-27 PROCEDURE — 99285 EMERGENCY DEPT VISIT HI MDM: CPT | Mod: 25

## 2024-03-27 PROCEDURE — 63600175 PHARM REV CODE 636 W HCPCS: Mod: JA | Performed by: EMERGENCY MEDICINE

## 2024-03-27 PROCEDURE — 85730 THROMBOPLASTIN TIME PARTIAL: CPT

## 2024-03-27 PROCEDURE — 87186 SC STD MICRODIL/AGAR DIL: CPT

## 2024-03-27 PROCEDURE — C9113 INJ PANTOPRAZOLE SODIUM, VIA: HCPCS

## 2024-03-27 PROCEDURE — 36415 COLL VENOUS BLD VENIPUNCTURE: CPT

## 2024-03-27 PROCEDURE — 85018 HEMOGLOBIN: CPT

## 2024-03-27 PROCEDURE — 87147 CULTURE TYPE IMMUNOLOGIC: CPT

## 2024-03-27 PROCEDURE — 80053 COMPREHEN METABOLIC PANEL: CPT | Performed by: EMERGENCY MEDICINE

## 2024-03-27 PROCEDURE — 93005 ELECTROCARDIOGRAM TRACING: CPT | Performed by: GENERAL PRACTICE

## 2024-03-27 PROCEDURE — 85610 PROTHROMBIN TIME: CPT | Performed by: EMERGENCY MEDICINE

## 2024-03-27 PROCEDURE — 96374 THER/PROPH/DIAG INJ IV PUSH: CPT

## 2024-03-27 PROCEDURE — 87086 URINE CULTURE/COLONY COUNT: CPT

## 2024-03-27 PROCEDURE — 63600175 PHARM REV CODE 636 W HCPCS: Performed by: INTERNAL MEDICINE

## 2024-03-27 PROCEDURE — 82272 OCCULT BLD FECES 1-3 TESTS: CPT | Performed by: EMERGENCY MEDICINE

## 2024-03-27 PROCEDURE — 87077 CULTURE AEROBIC IDENTIFY: CPT

## 2024-03-27 PROCEDURE — 96374 THER/PROPH/DIAG INJ IV PUSH: CPT | Mod: 59

## 2024-03-27 PROCEDURE — 82077 ASSAY SPEC XCP UR&BREATH IA: CPT

## 2024-03-27 RX ORDER — ALUMINUM HYDROXIDE, MAGNESIUM HYDROXIDE, AND SIMETHICONE 1200; 120; 1200 MG/30ML; MG/30ML; MG/30ML
30 SUSPENSION ORAL 4 TIMES DAILY PRN
Status: DISCONTINUED | OUTPATIENT
Start: 2024-03-27 | End: 2024-04-01 | Stop reason: HOSPADM

## 2024-03-27 RX ORDER — LANOLIN ALCOHOL/MO/W.PET/CERES
800 CREAM (GRAM) TOPICAL
Status: DISCONTINUED | OUTPATIENT
Start: 2024-03-27 | End: 2024-04-01 | Stop reason: HOSPADM

## 2024-03-27 RX ORDER — SODIUM,POTASSIUM PHOSPHATES 280-250MG
2 POWDER IN PACKET (EA) ORAL
Status: DISCONTINUED | OUTPATIENT
Start: 2024-03-27 | End: 2024-04-01 | Stop reason: HOSPADM

## 2024-03-27 RX ORDER — NALOXONE HCL 0.4 MG/ML
0.02 VIAL (ML) INJECTION
Status: DISCONTINUED | OUTPATIENT
Start: 2024-03-27 | End: 2024-04-01 | Stop reason: HOSPADM

## 2024-03-27 RX ORDER — SODIUM CHLORIDE, SODIUM LACTATE, POTASSIUM CHLORIDE, CALCIUM CHLORIDE 600; 310; 30; 20 MG/100ML; MG/100ML; MG/100ML; MG/100ML
INJECTION, SOLUTION INTRAVENOUS CONTINUOUS
Status: DISCONTINUED | OUTPATIENT
Start: 2024-03-27 | End: 2024-03-27

## 2024-03-27 RX ORDER — SODIUM CHLORIDE 0.9 % (FLUSH) 0.9 %
10 SYRINGE (ML) INJECTION EVERY 12 HOURS PRN
Status: DISCONTINUED | OUTPATIENT
Start: 2024-03-27 | End: 2024-04-01 | Stop reason: HOSPADM

## 2024-03-27 RX ORDER — OXYMETAZOLINE HCL 0.05 %
1 SPRAY, NON-AEROSOL (ML) NASAL
Status: COMPLETED | OUTPATIENT
Start: 2024-03-27 | End: 2024-03-27

## 2024-03-27 RX ORDER — HYDROXYZINE HYDROCHLORIDE 25 MG/1
25 TABLET, FILM COATED ORAL NIGHTLY PRN
COMMUNITY
Start: 2024-01-22 | End: 2024-06-12

## 2024-03-27 RX ORDER — SILVER NITRATE 38.21; 12.74 MG/1; MG/1
2 STICK TOPICAL ONCE
Status: DISCONTINUED | OUTPATIENT
Start: 2024-03-27 | End: 2024-04-01 | Stop reason: HOSPADM

## 2024-03-27 RX ORDER — PANTOPRAZOLE SODIUM 40 MG/10ML
80 INJECTION, POWDER, LYOPHILIZED, FOR SOLUTION INTRAVENOUS
Status: COMPLETED | OUTPATIENT
Start: 2024-03-27 | End: 2024-03-27

## 2024-03-27 RX ORDER — TALC
6 POWDER (GRAM) TOPICAL NIGHTLY PRN
Status: DISCONTINUED | OUTPATIENT
Start: 2024-03-27 | End: 2024-04-01 | Stop reason: HOSPADM

## 2024-03-27 RX ORDER — LORAZEPAM 2 MG/ML
2 INJECTION INTRAMUSCULAR
Status: DISCONTINUED | OUTPATIENT
Start: 2024-03-27 | End: 2024-04-01 | Stop reason: HOSPADM

## 2024-03-27 RX ORDER — OCTREOTIDE ACETATE 100 UG/ML
100 INJECTION, SOLUTION INTRAVENOUS; SUBCUTANEOUS
Status: COMPLETED | OUTPATIENT
Start: 2024-03-27 | End: 2024-03-27

## 2024-03-27 RX ORDER — LABETALOL HYDROCHLORIDE 5 MG/ML
10 INJECTION, SOLUTION INTRAVENOUS ONCE
Status: COMPLETED | OUTPATIENT
Start: 2024-03-28 | End: 2024-03-27

## 2024-03-27 RX ORDER — ONDANSETRON HYDROCHLORIDE 2 MG/ML
4 INJECTION, SOLUTION INTRAVENOUS EVERY 6 HOURS PRN
Status: DISCONTINUED | OUTPATIENT
Start: 2024-03-27 | End: 2024-04-01 | Stop reason: HOSPADM

## 2024-03-27 RX ORDER — LABETALOL HYDROCHLORIDE 5 MG/ML
10 INJECTION, SOLUTION INTRAVENOUS EVERY 4 HOURS PRN
Status: DISCONTINUED | OUTPATIENT
Start: 2024-03-27 | End: 2024-04-01 | Stop reason: HOSPADM

## 2024-03-27 RX ORDER — HYDROMORPHONE HYDROCHLORIDE 1 MG/ML
1 INJECTION, SOLUTION INTRAMUSCULAR; INTRAVENOUS; SUBCUTANEOUS EVERY 6 HOURS PRN
Status: DISCONTINUED | OUTPATIENT
Start: 2024-03-27 | End: 2024-04-01 | Stop reason: HOSPADM

## 2024-03-27 RX ADMIN — OCTREOTIDE ACETATE 50 MCG/HR: 500 INJECTION, SOLUTION INTRAVENOUS; SUBCUTANEOUS at 03:03

## 2024-03-27 RX ADMIN — Medication 1 SPRAY: at 01:03

## 2024-03-27 RX ADMIN — LORAZEPAM 2 MG: 2 INJECTION INTRAMUSCULAR; INTRAVENOUS at 03:03

## 2024-03-27 RX ADMIN — HYDROMORPHONE HYDROCHLORIDE 1 MG: 1 INJECTION, SOLUTION INTRAMUSCULAR; INTRAVENOUS; SUBCUTANEOUS at 04:03

## 2024-03-27 RX ADMIN — PANTOPRAZOLE SODIUM 8 MG/HR: 40 INJECTION, POWDER, FOR SOLUTION INTRAVENOUS at 03:03

## 2024-03-27 RX ADMIN — OCTREOTIDE ACETATE 100 MCG: 100 INJECTION, SOLUTION INTRAVENOUS; SUBCUTANEOUS at 02:03

## 2024-03-27 RX ADMIN — LABETALOL HYDROCHLORIDE 10 MG: 5 INJECTION, SOLUTION INTRAVENOUS at 11:03

## 2024-03-27 RX ADMIN — LABETALOL HYDROCHLORIDE 10 MG: 5 INJECTION, SOLUTION INTRAVENOUS at 04:03

## 2024-03-27 RX ADMIN — LABETALOL HYDROCHLORIDE 10 MG: 5 INJECTION, SOLUTION INTRAVENOUS at 10:03

## 2024-03-27 RX ADMIN — FOLIC ACID: 5 INJECTION, SOLUTION INTRAMUSCULAR; INTRAVENOUS; SUBCUTANEOUS at 10:03

## 2024-03-27 RX ADMIN — ONDANSETRON 4 MG: 2 INJECTION INTRAMUSCULAR; INTRAVENOUS at 10:03

## 2024-03-27 RX ADMIN — HYDROMORPHONE HYDROCHLORIDE 1 MG: 1 INJECTION, SOLUTION INTRAMUSCULAR; INTRAVENOUS; SUBCUTANEOUS at 10:03

## 2024-03-27 RX ADMIN — PANTOPRAZOLE SODIUM 80 MG: 40 INJECTION, POWDER, FOR SOLUTION INTRAVENOUS at 02:03

## 2024-03-27 RX ADMIN — PANTOPRAZOLE SODIUM 8 MG/HR: 40 INJECTION, POWDER, FOR SOLUTION INTRAVENOUS at 07:03

## 2024-03-27 NOTE — ASSESSMENT & PLAN NOTE
Patient was found to have thrombocytopenia, the likely etiology is secondary to cirrhosis/portal hypertension, will monitor the platelets Daily. Will transfuse if platelet count is <10k. Hold DVT prophylaxis if platelets are <50k. The patient's platelet results have been reviewed and are listed below.  Recent Labs   Lab 03/27/24  0850   PLT 69*

## 2024-03-27 NOTE — ASSESSMENT & PLAN NOTE
Creatine stable for now. BMP reviewed- noted Estimated Creatinine Clearance: 70.5 mL/min (A) (based on SCr of 1.6 mg/dL (H)). according to latest data. Based on current GFR, CKD stage is stage 3 - GFR 30-59.  Monitor UOP and serial BMP and adjust therapy as needed. Renally dose meds. Avoid nephrotoxic medications and procedures.

## 2024-03-27 NOTE — Clinical Note
Diagnosis: Gastrointestinal hemorrhage, unspecified gastrointestinal hemorrhage type [[id number]]   Future Attending Provider: MARIA ISABEL SILVESTRE [[id number]]   Place in Observation: [facility] [[id number]]

## 2024-03-27 NOTE — H&P
[facility] - Emergency Dept  Hospital Medicine  History & Physical    Patient Name: Irvin Diaz Jr.  MRN: [id number]  Patient Class: OP- Observation  Admission Date: 3/27/2024  Attending Physician: Irvin Jones MD   Primary Care Provider: Edouard Gibson MD         Patient information was obtained from patient, past medical records, and ER records.     Subjective:     Principal Problem:Gastrointestinal hemorrhage    Chief Complaint:   Chief Complaint   Patient presents with    Epistaxis     Has had nose bleed x 24 hours, throwing up blood. Dark stools and has a hx of esophageal varices         HPI: 48-year-old male presented to ED for eval of bleeding. Patient reported he has been having intermittent epistaxis since yesterday. Patient also reported today he had hematemesis and tarry stools. Patient has hx of alcoholic cirrhosis. Patient reported he had not had a drink for about 2 months prior to 2 days ago when he did consume alcohol. Patient has had episodes similar to this in the past and has required transfusions. Denies taking blood thinners or NSAIDs. Denied abd pain. Denied fever. Hgb & hct stable. Noted with thrombocytopenia, appears to be around baseline. Noted to have bright red bloody emesis to emesis bag on exam, epistaxis has ceased. Heme occult positive in ED. GI consulted in ED. Admit to hospital medicine for further eval.     Past Medical History:   Diagnosis Date    Alcohol withdrawal 5/1/2022    Alcoholic cirrhosis of liver     Alcoholic ketoacidosis 6/6/2021    Arthritis     ATN (acute tubular necrosis)     CHF (congestive heart failure)     Diverticulitis 01/2020    Esophageal varices     GERD (gastroesophageal reflux disease)     GI bleed     Hip arthritis     Left    Hypertension     Liver cirrhosis     Macrocytic anemia     Pulmonary embolism 08/2018    Unprovoked DVT.  Stop Coumadin due to GIB    Thrombocytopenia        Past Surgical History:   Procedure Laterality  Date    ANKLE SURGERY      BLOCK, NERVE, PERIPHERAL Left 06/24/2022    Procedure: Left Hip femoral-obturator accessory nerve block;  Surgeon: Robbin De La Garza DO;  Location: [facility] OR;  Service: Pain Management;  Laterality: Left;    COLON SURGERY  2007    COLONOSCOPY  09/05/2019    [facility]    COLONOSCOPY N/A 02/17/2021    Procedure: COLONOSCOPY;  Surgeon: Renea Billingsley MD;  Location: [facility];  Service: Endoscopy;  Laterality: N/A;    COLONOSCOPY N/A 2/13/2023    Procedure: COLONOSCOPY;  Surgeon: Rudy Orellana MD;  Location: [facility] (4TH FLR);  Service: Endoscopy;  Laterality: N/A;  cirrhosis-labs done on 1/11/23  instr portal-GT  No answer for precall- [place]    COLONOSCOPY N/A 3/10/2023    Procedure: COLONOSCOPY;  Surgeon: Rudy Orellana MD;  [facility]  Service: Endoscopy;  Laterality: N/A;  inst via poral per pt request    COLONOSCOPY W/ BIOPSIES  02/17/2021    ESOPHAGOGASTRODUODENOSCOPY N/A 07/26/2019    Procedure: EGD (ESOPHAGOGASTRODUODENOSCOPY);  Surgeon: Brian Trivedi MD;  Location: [facility];  Service: Endoscopy;  Laterality: N/A;    ESOPHAGOGASTRODUODENOSCOPY N/A 04/08/2020    Procedure: EGD (ESOPHAGOGASTRODUODENOSCOPY);  Surgeon: Donn Acuna MD;  Location: [facility];  Service: Endoscopy;  Laterality: N/A;    ESOPHAGOGASTRODUODENOSCOPY N/A 01/03/2021    Procedure: EGD (ESOPHAGOGASTRODUODENOSCOPY)- coffee ground emesis, hx varices;  Surgeon: Steve Chairez MD;  Location: [facility];  Service: Endoscopy;  Laterality: N/A;    ESOPHAGOGASTRODUODENOSCOPY N/A 05/03/2021    Procedure: EGD (ESOPHAGOGASTRODUODENOSCOPY);  Surgeon: Jeanmarie Ngo MD;  Location: [facility];  Service: Endoscopy;  Laterality: N/A;    ESOPHAGOGASTRODUODENOSCOPY N/A 07/19/2021    Procedure: ESOPHAGOGASTRODUODENOSCOPY (EGD);  Surgeon: Claudio Medeiros MD;  Location[facility];  Service: Endoscopy;  Laterality: N/A;    ESOPHAGOGASTRODUODENOSCOPY  12/20/2021    ESOPHAGOGASTRODUODENOSCOPY N/A 12/20/2021     Procedure: EGD (ESOPHAGOGASTRODUODENOSCOPY);  Surgeon: Ajay Jackson MD;  Location: [facility] ENDO;  Service: Endoscopy;  Laterality: N/A;    ESOPHAGOGASTRODUODENOSCOPY N/A 05/03/2022    Procedure: EGD (ESOPHAGOGASTRODUODENOSCOPY);  Surgeon: Brian Trivedi MD;  Location: [facility];  Service: Endoscopy;  Laterality: N/A;    ESOPHAGOGASTRODUODENOSCOPY N/A 7/7/2022    Procedure: EGD (ESOPHAGOGASTRODUODENOSCOPY);  Surgeon: Ajay Jackson MD;  Location: [facility];  Service: Endoscopy;  Laterality: N/A;    ESOPHAGOGASTRODUODENOSCOPY N/A 11/29/2022    Procedure: EGD (ESOPHAGOGASTRODUODENOSCOPY);  Surgeon: Edouard Maynard MD;  Location: [facility] ([facility] FLR);  Service: Endoscopy;  Laterality: N/A;    ESOPHAGOGASTRODUODENOSCOPY N/A 4/28/2023    Procedure: EGD (ESOPHAGOGASTRODUODENOSCOPY);  Surgeon: Caesar Dickens MD;  Location: [facility];  Service: Endoscopy;  Laterality: N/A;    HERNIA REPAIR Left     Inguinal    INJECTION OF JOINT Right 9/28/2023    Procedure: RT Hip Injection;  Surgeon: Robbin De La Garza DO;  Location: [facility] PAIN MANAGEMENT;  Service: Pain Management;  Laterality: Right;  oral - 20 mins    UPPER GASTROINTESTINAL ENDOSCOPY N/A 07/07/2022       Review of patient's allergies indicates:   Allergen Reactions    Ciprofloxacin hcl Hallucinations    Meperidine Hives     Pt medicated w/multiple medications (demerol, protonix, and zofran)  w/i 10 mins time frame prior to developing localized hives near IV site that med was administered. Pt reports he has/takes all other medications on daily basis.     Morphine Itching    Nsaids (non-steroidal anti-inflammatory drug)      Kidney Disease    Tylenol [acetaminophen]      Hx of liver disease       No current facility-administered medications on file prior to encounter.     Current Outpatient Medications on File Prior to Encounter   Medication Sig    traMADoL (ULTRAM) 50 mg tablet Take 1 tablet (50 mg total) by mouth 4 (four) times daily as needed for  Pain.    acamprosate (CAMPRAL) 333 mg tablet Take 2 tablets (666 mg total) by mouth 3 (three) times daily.    amLODIPine (NORVASC) 10 MG tablet Take 1 tablet (10 mg total) by mouth once daily.    augmented betamethasone dipropionate (DIPROLENE-AF) 0.05 % cream Apply topically 2 (two) times daily. (Patient taking differently: Apply 1 application  topically 2 (two) times daily.)    calcitRIOL (ROCALTROL) 0.25 MCG Cap Take 1 capsule (0.25 mcg total) by mouth once daily.    carvediloL (COREG) 12.5 MG tablet Take 1 tablet (12.5 mg total) by mouth 2 (two) times daily with meals.    clindamycin-benzoyl peroxide (BENZACLIN) gel Apply topically 2 (two) times daily. (Patient taking differently: Apply 1 application  topically 2 (two) times daily.)    ferrous gluconate (FERGON) 240 (27 FE) MG tablet Take 2 tablets (480 mg total) by mouth 2 (two) times daily with meals.    folic acid (FOLVITE) 1 MG tablet Take 1 tablet (1 mg total) by mouth once daily.    furosemide (LASIX) 20 MG tablet Take 1 tablet (20 mg total) by mouth once daily.    hydrOXYzine HCL (ATARAX) 25 MG tablet Take 25 mg by mouth nightly as needed for Itching.    pantoprazole (PROTONIX) 40 MG tablet Take 2 tablets (80 mg total) by mouth 2 (two) times daily.    rifAXIMin (XIFAXAN) 550 mg Tab Take 1 tablet (550 mg total) by mouth 2 (two) times daily.    sildenafiL (VIAGRA) 100 MG tablet Take 1 tablet (100 mg total) by mouth daily as needed for Erectile Dysfunction.    spironolactone (ALDACTONE) 50 MG tablet Take 1 tablet (50 mg total) by mouth once daily.    thiamine 100 MG tablet Take 1 tablet (100 mg total) by mouth once daily.     Family History       Problem Relation (Age of Onset)    Bladder Cancer Father    Breast cancer Mother    Hypertension Mother, Father    Prostate cancer Father          Tobacco Use    Smoking status: Former     Current packs/day: 0.00     Types: Cigarettes     Quit date:      Years since quittin.2    Smokeless tobacco: Never     Tobacco comments:     quit 20 years ago   Substance and Sexual Activity    Alcohol use: Not Currently     Comment: Quit drinking 9/12/22    Drug use: Not Currently    Sexual activity: Yes     Comment: occ     Review of Systems   Constitutional:  Negative for chills and fever.   HENT:  Positive for nosebleeds.    Respiratory:  Negative for chest tightness and shortness of breath.    Cardiovascular:  Negative for chest pain, palpitations and leg swelling.   Gastrointestinal:  Positive for abdominal pain, blood in stool, diarrhea, nausea and vomiting. Negative for constipation.   Neurological:  Negative for syncope.     Objective:     Vital Signs (Most Recent):  Temp: 98.2 °F (36.8 °C) (03/27/24 0818)  Pulse: 105 (03/27/24 0818)  Resp: 18 (03/27/24 0818)  BP: (!) 202/102 (03/27/24 0818)  SpO2: 98 % (03/27/24 0818) Vital Signs (24h Range):  Temp:  [98.2 °F (36.8 °C)] 98.2 °F (36.8 °C)  Pulse:  [105] 105  Resp:  [18] 18  SpO2:  [98 %] 98 %  BP: (202)/(102) 202/102     Weight: 106.6 kg (235 lb)  Body mass index is 31.87 kg/m².     Physical Exam  Vitals reviewed.   Constitutional:       Appearance: He is ill-appearing.   HENT:      Head: Normocephalic and atraumatic.      Mouth/Throat:      Mouth: Mucous membranes are moist.   Eyes:      Conjunctiva/sclera: Conjunctivae normal.   Cardiovascular:      Rate and Rhythm: Regular rhythm. Tachycardia present.   Pulmonary:      Effort: Pulmonary effort is normal. No respiratory distress.      Breath sounds: Normal breath sounds.   Abdominal:      General: Bowel sounds are normal.      Palpations: Abdomen is soft.      Tenderness: There is no abdominal tenderness.      Comments: Hematemesis noted to emesis bag.    Musculoskeletal:         General: No swelling. Normal range of motion.      Cervical back: Normal range of motion.   Skin:     General: Skin is warm and dry.   Neurological:      Mental Status: He is alert and oriented to person, place, and time. Mental status is at  "baseline.   Psychiatric:         Mood and Affect: Mood normal.                Significant Labs: All pertinent labs within the past 24 hours have been reviewed.  CBC:   Recent Labs   Lab 03/27/24  0850   WBC 6.16   HGB 11.9*   HCT 36.2*   PLT 69*     CMP:   Recent Labs   Lab 03/27/24  0850      K 4.1      CO2 25      BUN 31*   CREATININE 1.6*   CALCIUM 8.9   PROT 8.6*   ALBUMIN 3.8   BILITOT 3.9*   ALKPHOS 102   AST 47*   ALT 26   ANIONGAP 11     Coagulation:   Recent Labs   Lab 03/27/24  0850   INR 1.2     Lipase:   Recent Labs   Lab 03/27/24  0850   LIPASE 67*     Magnesium: No results for input(s): "MG" in the last 48 hours.    Significant Imaging: I have reviewed all pertinent imaging results/findings within the past 24 hours.  Assessment/Plan:     * Gastrointestinal hemorrhage  Consult Gastroenterologist - follow recommendations.  NPO.  Follow H/H closely, serial h&h q6h x 4.   Type and screen, transfuse blood as needed hgb<7.  IV Protonix and Octreotide transfusions, monitor plts closely  IV hydration.  IV antiemetics prn.       HTN (hypertension)  Chronic, uncontrolled. Latest blood pressure and vitals reviewed-     Temp:  [98.2 °F (36.8 °C)]   Pulse:  [105]   Resp:  [18]   BP: (202)/(102)   SpO2:  [98 %] .   Home meds for hypertension were reviewed and noted below.   Hypertension Medications               amLODIPine (NORVASC) 10 MG tablet Take 1 tablet (10 mg total) by mouth once daily.    carvediloL (COREG) 12.5 MG tablet Take 1 tablet (12.5 mg total) by mouth 2 (two) times daily with meals.    furosemide (LASIX) 20 MG tablet Take 1 tablet (20 mg total) by mouth once daily.    spironolactone (ALDACTONE) 50 MG tablet Take 1 tablet (50 mg total) by mouth once daily.            While in the hospital, will manage blood pressure as follows; Adjust home antihypertensive regimen as follows- IV labetalol, holding home PO meds given varices and active hematemesis    Will utilize p.r.n. blood " pressure medication only if patient's blood pressure greater than 180/110 and he develops symptoms such as worsening chest pain or shortness of breath.    Alcohol abuse  Banana bag  CIWA precautions  Ativan PRN withdrawal  Tele monitoring      CKD (chronic kidney disease) stage 3, GFR 30-59 ml/min  Creatine stable for now. BMP reviewed- noted Estimated Creatinine Clearance: 70.5 mL/min (A) (based on SCr of 1.6 mg/dL (H)). according to latest data. Based on current GFR, CKD stage is stage 3 - GFR 30-59.  Monitor UOP and serial BMP and adjust therapy as needed. Renally dose meds. Avoid nephrotoxic medications and procedures.    Alcoholic cirrhosis of liver  Patient with known Cirrhosis with Child's class B. Co-morbidities are present and inclusive of esophageal varices and anemia.  MELD-Na score calculated; MELD 3.0: 19 at 3/27/2024  8:50 AM  MELD-Na: 18 at 3/27/2024  8:50 AM  Calculated from:  Serum Creatinine: 1.6 mg/dL at 3/27/2024  8:50 AM  Serum Sodium: 140 mmol/L (Using max of 137 mmol/L) at 3/27/2024  8:50 AM  Total Bilirubin: 3.9 mg/dL at 3/27/2024  8:50 AM  Serum Albumin: 3.8 g/dL (Using max of 3.5 g/dL) at 3/27/2024  8:50 AM  INR(ratio): 1.2 at 3/27/2024  8:50 AM  Age at listing (hypothetical): 48 years  Sex: Male at 3/27/2024  8:50 AM      Continue chronic meds. Etiology likely ETOH. Will avoid any hepatotoxic meds, and monitor CBC/CMP/INR for synthetic function.     Thrombocytopenia  Patient was found to have thrombocytopenia, the likely etiology is secondary to cirrhosis/portal hypertension, will monitor the platelets Daily. Will transfuse if platelet count is <10k. Hold DVT prophylaxis if platelets are <50k. The patient's platelet results have been reviewed and are listed below.  Recent Labs   Lab 03/27/24  0850   PLT 69*           VTE Risk Mitigation (From admission, onward)           Ordered     Reason for No Pharmacological VTE Prophylaxis  Once        Question:  Reasons:  Answer:  Active Bleeding     03/27/24 1457     IP VTE HIGH RISK PATIENT  Once         03/27/24 1457     Place sequential compression device  Until discontinued         03/27/24 1457                         On 03/27/2024, patient should be placed in hospital observation services under my care in collaboration with Dr. Jones.           Zoila León NP  Department of Hospital Medicine  Formerly [facility], [facility] - Emergency Dept

## 2024-03-27 NOTE — ASSESSMENT & PLAN NOTE
Chronic, uncontrolled. Latest blood pressure and vitals reviewed-     Temp:  [98.2 °F (36.8 °C)]   Pulse:  [105]   Resp:  [18]   BP: (202)/(102)   SpO2:  [98 %] .   Home meds for hypertension were reviewed and noted below.   Hypertension Medications               amLODIPine (NORVASC) 10 MG tablet Take 1 tablet (10 mg total) by mouth once daily.    carvediloL (COREG) 12.5 MG tablet Take 1 tablet (12.5 mg total) by mouth 2 (two) times daily with meals.    furosemide (LASIX) 20 MG tablet Take 1 tablet (20 mg total) by mouth once daily.    spironolactone (ALDACTONE) 50 MG tablet Take 1 tablet (50 mg total) by mouth once daily.            While in the hospital, will manage blood pressure as follows; Adjust home antihypertensive regimen as follows- IV labetalol, holding home PO meds given varices and active hematemesis    Will utilize p.r.n. blood pressure medication only if patient's blood pressure greater than 180/110 and he develops symptoms such as worsening chest pain or shortness of breath.

## 2024-03-27 NOTE — ASSESSMENT & PLAN NOTE
Patient with known Cirrhosis with Child's class B. Co-morbidities are present and inclusive of esophageal varices and anemia.  MELD-Na score calculated; MELD 3.0: 19 at 3/27/2024  8:50 AM  MELD-Na: 18 at 3/27/2024  8:50 AM  Calculated from:  Serum Creatinine: 1.6 mg/dL at 3/27/2024  8:50 AM  Serum Sodium: 140 mmol/L (Using max of 137 mmol/L) at 3/27/2024  8:50 AM  Total Bilirubin: 3.9 mg/dL at 3/27/2024  8:50 AM  Serum Albumin: 3.8 g/dL (Using max of 3.5 g/dL) at 3/27/2024  8:50 AM  INR(ratio): 1.2 at 3/27/2024  8:50 AM  Age at listing (hypothetical): 48 years  Sex: Male at 3/27/2024  8:50 AM      Continue chronic meds. Etiology likely ETOH. Will avoid any hepatotoxic meds, and monitor CBC/CMP/INR for synthetic function.

## 2024-03-27 NOTE — SUBJECTIVE & OBJECTIVE
Past Medical History:   Diagnosis Date    Alcohol withdrawal 5/1/2022    Alcoholic cirrhosis of liver     Alcoholic ketoacidosis 6/6/2021    Arthritis     ATN (acute tubular necrosis)     CHF (congestive heart failure)     Diverticulitis 01/2020    Esophageal varices     GERD (gastroesophageal reflux disease)     GI bleed     Hip arthritis     Left    Hypertension     Liver cirrhosis     Macrocytic anemia     Pulmonary embolism 08/2018    Unprovoked DVT.  Stop Coumadin due to GIB    Thrombocytopenia        Past Surgical History:   Procedure Laterality Date    ANKLE SURGERY      BLOCK, NERVE, PERIPHERAL Left 06/24/2022    Procedure: Left Hip femoral-obturator accessory nerve block;  Surgeon: Robbin De La Garza DO;  Location: [facility] OR;  Service: Pain Management;  Laterality: Left;    COLON SURGERY  2007    COLONOSCOPY  09/05/2019    [facility]    COLONOSCOPY N/A 02/17/2021    Procedure: COLONOSCOPY;  Surgeon: Renea Billingsley MD;  Location: [facility];  Service: Endoscopy;  Laterality: N/A;    COLONOSCOPY N/A 2/13/2023    Procedure: COLONOSCOPY;  Surgeon: Rudy Orellana MD;  Location: [facility] ([address] [place] [address]);  Service: Endoscopy;  Laterality: N/A;  cirrhosis-labs done on 1/11/23  instr portal-GT  No answer for precall- [place]    COLONOSCOPY N/A 3/10/2023    Procedure: COLONOSCOPY;  Surgeon: Rudy Orellana MD;  Location: [facility] ([address] [place] [address]);  Service: Endoscopy;  Laterality: N/A;  inst via poral per pt request    COLONOSCOPY W/ BIOPSIES  02/17/2021    ESOPHAGOGASTRODUODENOSCOPY N/A 07/26/2019    Procedure: EGD (ESOPHAGOGASTRODUODENOSCOPY);  Surgeon: Brian Trivedi MD;  Location: [facility];  Service: Endoscopy;  Laterality: N/A;    ESOPHAGOGASTRODUODENOSCOPY N/A 04/08/2020    Procedure: EGD (ESOPHAGOGASTRODUODENOSCOPY);  Surgeon: Donn Acuna MD;  Location: [facility];  Service: Endoscopy;  Laterality: N/A;    ESOPHAGOGASTRODUODENOSCOPY N/A 01/03/2021    Procedure: EGD (ESOPHAGOGASTRODUODENOSCOPY)- coffee ground emesis, hx varices;   Surgeon: Steve Chairez MD;  Location: [facility];  Service: Endoscopy;  Laterality: N/A;    ESOPHAGOGASTRODUODENOSCOPY N/A 05/03/2021    Procedure: EGD (ESOPHAGOGASTRODUODENOSCOPY);  Surgeon: Jeanmarie Ngo MD;  Location: [facility];  Service: Endoscopy;  Laterality: N/A;    ESOPHAGOGASTRODUODENOSCOPY N/A 07/19/2021    Procedure: ESOPHAGOGASTRODUODENOSCOPY (EGD);  Surgeon: Claudio Medeiros MD;  Location: [facility];  Service: Endoscopy;  Laterality: N/A;    ESOPHAGOGASTRODUODENOSCOPY  12/20/2021    ESOPHAGOGASTRODUODENOSCOPY N/A 12/20/2021    Procedure: EGD (ESOPHAGOGASTRODUODENOSCOPY);  Surgeon: Ajay Jackson MD;  Location: [facility];  Service: Endoscopy;  Laterality: N/A;    ESOPHAGOGASTRODUODENOSCOPY N/A 05/03/2022    Procedure: EGD (ESOPHAGOGASTRODUODENOSCOPY);  Surgeon: Brian Trivedi MD;  Location: [facility];  Service: Endoscopy;  Laterality: N/A;    ESOPHAGOGASTRODUODENOSCOPY N/A 7/7/2022    Procedure: EGD (ESOPHAGOGASTRODUODENOSCOPY);  Surgeon: Ajay Jackson MD;  Location: [facility];  Service: Endoscopy;  Laterality: N/A;    ESOPHAGOGASTRODUODENOSCOPY N/A 11/29/2022    Procedure: EGD (ESOPHAGOGASTRODUODENOSCOPY);  Surgeon: Edouard Maynard MD;  Location: [facility] ([address] [place] [address]);  Service: Endoscopy;  Laterality: N/A;    ESOPHAGOGASTRODUODENOSCOPY N/A 4/28/2023    Procedure: EGD (ESOPHAGOGASTRODUODENOSCOPY);  Surgeon: Caesar Dickens MD;  Location: [facility];  Service: Endoscopy;  Laterality: N/A;    HERNIA REPAIR Left     Inguinal    INJECTION OF JOINT Right 9/28/2023    Procedure: RT Hip Injection;  Surgeon: Robbin De La Garza DO;  Location: [facility];  Service: Pain Management;  Laterality: Right;  oral - 20 mins    UPPER GASTROINTESTINAL ENDOSCOPY N/A 07/07/2022       Review of patient's allergies indicates:   Allergen Reactions    Ciprofloxacin hcl Hallucinations    Meperidine Hives     Pt medicated w/multiple medications (demerol, protonix, and zofran)  w/i 10 mins  time frame prior to developing localized hives near IV site that med was administered. Pt reports he has/takes all other medications on daily basis.     Morphine Itching    Nsaids (non-steroidal anti-inflammatory drug)      Kidney Disease    Tylenol [acetaminophen]      Hx of liver disease       No current facility-administered medications on file prior to encounter.     Current Outpatient Medications on File Prior to Encounter   Medication Sig    traMADoL (ULTRAM) 50 mg tablet Take 1 tablet (50 mg total) by mouth 4 (four) times daily as needed for Pain.    acamprosate (CAMPRAL) 333 mg tablet Take 2 tablets (666 mg total) by mouth 3 (three) times daily.    amLODIPine (NORVASC) 10 MG tablet Take 1 tablet (10 mg total) by mouth once daily.    augmented betamethasone dipropionate (DIPROLENE-AF) 0.05 % cream Apply topically 2 (two) times daily. (Patient taking differently: Apply 1 application  topically 2 (two) times daily.)    calcitRIOL (ROCALTROL) 0.25 MCG Cap Take 1 capsule (0.25 mcg total) by mouth once daily.    carvediloL (COREG) 12.5 MG tablet Take 1 tablet (12.5 mg total) by mouth 2 (two) times daily with meals.    clindamycin-benzoyl peroxide (BENZACLIN) gel Apply topically 2 (two) times daily. (Patient taking differently: Apply 1 application  topically 2 (two) times daily.)    ferrous gluconate (FERGON) 240 (27 FE) MG tablet Take 2 tablets (480 mg total) by mouth 2 (two) times daily with meals.    folic acid (FOLVITE) 1 MG tablet Take 1 tablet (1 mg total) by mouth once daily.    furosemide (LASIX) 20 MG tablet Take 1 tablet (20 mg total) by mouth once daily.    hydrOXYzine HCL (ATARAX) 25 MG tablet Take 25 mg by mouth nightly as needed for Itching.    pantoprazole (PROTONIX) 40 MG tablet Take 2 tablets (80 mg total) by mouth 2 (two) times daily.    rifAXIMin (XIFAXAN) 550 mg Tab Take 1 tablet (550 mg total) by mouth 2 (two) times daily.    sildenafiL (VIAGRA) 100 MG tablet Take 1 tablet (100 mg total) by  mouth daily as needed for Erectile Dysfunction.    spironolactone (ALDACTONE) 50 MG tablet Take 1 tablet (50 mg total) by mouth once daily.    thiamine 100 MG tablet Take 1 tablet (100 mg total) by mouth once daily.     Family History       Problem Relation (Age of Onset)    Bladder Cancer Father    Breast cancer Mother    Hypertension Mother, Father    Prostate cancer Father          Tobacco Use    Smoking status: Former     Current packs/day: 0.00     Types: Cigarettes     Quit date:      Years since quittin.2    Smokeless tobacco: Never    Tobacco comments:     quit 20 years ago   Substance and Sexual Activity    Alcohol use: Not Currently     Comment: Quit drinking 21    Drug use: Not Currently    Sexual activity: Yes     Comment: occ     Review of Systems   Constitutional:  Negative for chills and fever.   HENT:  Positive for nosebleeds.    Respiratory:  Negative for chest tightness and shortness of breath.    Cardiovascular:  Negative for chest pain, palpitations and leg swelling.   Gastrointestinal:  Positive for abdominal pain, blood in stool, diarrhea, nausea and vomiting. Negative for constipation.   Neurological:  Negative for syncope.     Objective:     Vital Signs (Most Recent):  Temp: 98.2 °F (36.8 °C) (24)  Pulse: 105 (24)  Resp: 18 (24)  BP: (!) 202/102 (24)  SpO2: 98 % (24) Vital Signs (24h Range):  Temp:  [98.2 °F (36.8 °C)] 98.2 °F (36.8 °C)  Pulse:  [105] 105  Resp:  [18] 18  SpO2:  [98 %] 98 %  BP: (202)/(102) 202/102     Weight: 106.6 kg (235 lb)  Body mass index is 31.87 kg/m².     Physical Exam  Vitals reviewed.   Constitutional:       Appearance: He is ill-appearing.   HENT:      Head: Normocephalic and atraumatic.      Mouth/Throat:      Mouth: Mucous membranes are moist.   Eyes:      Conjunctiva/sclera: Conjunctivae normal.   Cardiovascular:      Rate and Rhythm: Regular rhythm. Tachycardia present.   Pulmonary:       "Effort: Pulmonary effort is normal. No respiratory distress.      Breath sounds: Normal breath sounds.   Abdominal:      General: Bowel sounds are normal.      Palpations: Abdomen is soft.      Tenderness: There is no abdominal tenderness.      Comments: Hematemesis noted to emesis bag.    Musculoskeletal:         General: No swelling. Normal range of motion.      Cervical back: Normal range of motion.   Skin:     General: Skin is warm and dry.   Neurological:      Mental Status: He is alert and oriented to person, place, and time. Mental status is at baseline.   Psychiatric:         Mood and Affect: Mood normal.                Significant Labs: All pertinent labs within the past 24 hours have been reviewed.  CBC:   Recent Labs   Lab 03/27/24  0850   WBC 6.16   HGB 11.9*   HCT 36.2*   PLT 69*     CMP:   Recent Labs   Lab 03/27/24  0850      K 4.1      CO2 25      BUN 31*   CREATININE 1.6*   CALCIUM 8.9   PROT 8.6*   ALBUMIN 3.8   BILITOT 3.9*   ALKPHOS 102   AST 47*   ALT 26   ANIONGAP 11     Coagulation:   Recent Labs   Lab 03/27/24  0850   INR 1.2     Lipase:   Recent Labs   Lab 03/27/24  0850   LIPASE 67*     Magnesium: No results for input(s): "MG" in the last 48 hours.    Significant Imaging: I have reviewed all pertinent imaging results/findings within the past 24 hours.  "

## 2024-03-27 NOTE — HPI
48-year-old male presented to ED for eval of bleeding. Patient reported he has been having intermittent epistaxis since yesterday. Patient also reported today he had hematemesis and tarry stools. Patient has hx of alcoholic cirrhosis. Patient reported he had not had a drink for about 2 months prior to 2 days ago when he did consume alcohol. Patient has had episodes similar to this in the past and has required transfusions. Denies taking blood thinners or NSAIDs. Denied abd pain. Denied fever. Hgb & hct stable. Noted with thrombocytopenia, appears to be around baseline. Noted to have bright red bloody emesis to emesis bag on exam, epistaxis has ceased. Heme occult positive in ED. GI consulted in ED. Admit to hospital medicine for further eval.

## 2024-03-27 NOTE — ASSESSMENT & PLAN NOTE
Consult Gastroenterologist - follow recommendations.  NPO.  Follow H/H closely, serial h&h q6h x 4.   Type and screen, transfuse blood as needed hgb<7.  IV Protonix and Octreotide transfusions, monitor plts closely  IV hydration.  IV antiemetics prn.

## 2024-03-27 NOTE — ED PROVIDER NOTES
Encounter Date: 3/27/2024       History     Chief Complaint   Patient presents with    Epistaxis     Has had nose bleed x 24 hours, throwing up blood. Dark stools and has a hx of esophageal varices      48-year-old male past medical history of CHF, esophageal varices, GI bleeding, cirrhosis, macrocytic anemia, thrombocytopenia, alcoholic ketoacidosis, presents emergency department with nosebleed, vomiting blood, blood in stool.  Patient says that yesterday he started with a nosebleed for about the last 24 hours.  Says that it keeps coming and going.  Says that it will stop and then it will start up again.  He says that he in addition started to vomit up blood after 1 of the nosebleed episodes.  He says he has also had black stool as well since this morning.  No abdominal pain.  Says he drank some alcohol 2 days ago was his last drink.  He says that he has been doing good and really has not drank anything in 2 months but now started with symptoms as mentioned above.  He has not on any blood thinners.      Review of patient's allergies indicates:   Allergen Reactions    Ciprofloxacin hcl Hallucinations    Meperidine Hives     Pt medicated w/multiple medications (demerol, protonix, and zofran)  w/i 10 mins time frame prior to developing localized hives near IV site that med was administered. Pt reports he has/takes all other medications on daily basis.     Morphine Itching    Nsaids (non-steroidal anti-inflammatory drug)      Kidney Disease    Tylenol [acetaminophen]      Hx of liver disease     Past Medical History:   Diagnosis Date    Alcohol withdrawal 5/1/2022    Alcoholic cirrhosis of liver     Alcoholic ketoacidosis 6/6/2021    Arthritis     ATN (acute tubular necrosis)     CHF (congestive heart failure)     Diverticulitis 01/2020    Esophageal varices     GERD (gastroesophageal reflux disease)     GI bleed     Hip arthritis     Left    Hypertension     Liver cirrhosis     Macrocytic anemia     Pulmonary embolism  08/2018    Unprovoked DVT.  Stop Coumadin due to GIB    Thrombocytopenia      Past Surgical History:   Procedure Laterality Date    ANKLE SURGERY      BLOCK, NERVE, PERIPHERAL Left 06/24/2022    Procedure: Left Hip femoral-obturator accessory nerve block;  Surgeon: Robbin De La Garza DO;  Location: [facility];  Service: Pain Management;  Laterality: Left;    COLON SURGERY  2007    COLONOSCOPY  09/05/2019    [facility]    COLONOSCOPY N/A 02/17/2021    Procedure: COLONOSCOPY;  Surgeon: Renae Billingsley MD;  Location: [facility];  Service: Endoscopy;  Laterality: N/A;    COLONOSCOPY N/A 2/13/2023    Procedure: COLONOSCOPY;  Surgeon: Rudy Orellana MD;  Location: [facility] ([facility]);  Service: Endoscopy;  Laterality: N/A;  cirrhosis-labs done on 1/11/23  instr portal-GT  No answer for precall- [place]    COLONOSCOPY N/A 3/10/2023    Procedure: COLONOSCOPY;  Surgeon: Rudy Orellana MD;  [facility]  Service: Endoscopy;  Laterality: N/A;  inst via poral per pt request    COLONOSCOPY W/ BIOPSIES  02/17/2021    ESOPHAGOGASTRODUODENOSCOPY N/A 07/26/2019    Procedure: EGD (ESOPHAGOGASTRODUODENOSCOPY);  Surgeon: Brian Trivedi MD;  Location[facility];  Service: Endoscopy;  Laterality: N/A;    ESOPHAGOGASTRODUODENOSCOPY N/A 04/08/2020    Procedure: EGD (ESOPHAGOGASTRODUODENOSCOPY);  Surgeon: Donn Acuna MD;  Location: [facility];  Service: Endoscopy;  Laterality: N/A;    ESOPHAGOGASTRODUODENOSCOPY N/A 01/03/2021    Procedure: EGD (ESOPHAGOGASTRODUODENOSCOPY)- coffee ground emesis, hx varices;  Surgeon: Steve Chairez MD;  Location: [facility];  Service: Endoscopy;  Laterality: N/A;    ESOPHAGOGASTRODUODENOSCOPY N/A 05/03/2021    Procedure: EGD (ESOPHAGOGASTRODUODENOSCOPY);  Surgeon: Jeanmarie Ngo MD;  Location: [facility];  Service: Endoscopy;  Laterality: N/A;    ESOPHAGOGASTRODUODENOSCOPY N/A 07/19/2021    Procedure: ESOPHAGOGASTRODUODENOSCOPY (EGD);  Surgeon: Claudio Medeiros MD;  Location: [facility];   Service: Endoscopy;  Laterality: N/A;    ESOPHAGOGASTRODUODENOSCOPY  2021    ESOPHAGOGASTRODUODENOSCOPY N/A 2021    Procedure: EGD (ESOPHAGOGASTRODUODENOSCOPY);  Surgeon: Ajay Jackson MD;  Location: [facility];  Service: Endoscopy;  Laterality: N/A;    ESOPHAGOGASTRODUODENOSCOPY N/A 2022    Procedure: EGD (ESOPHAGOGASTRODUODENOSCOPY);  Surgeon: Brian Trivedi MD;  Location: [facility];  Service: Endoscopy;  Laterality: N/A;    ESOPHAGOGASTRODUODENOSCOPY N/A 2022    Procedure: EGD (ESOPHAGOGASTRODUODENOSCOPY);  Surgeon: Ajay Jackson MD;  Location: [facility];  Service: Endoscopy;  Laterality: N/A;    ESOPHAGOGASTRODUODENOSCOPY N/A 2022    Procedure: EGD (ESOPHAGOGASTRODUODENOSCOPY);  Surgeon: Edouard Maynard MD;  Location: [address]);  Service: Endoscopy;  Laterality: N/A;    ESOPHAGOGASTRODUODENOSCOPY N/A 2023    Procedure: EGD (ESOPHAGOGASTRODUODENOSCOPY);  Surgeon: Caesar Dickens MD;  Location: [facility];  Service: Endoscopy;  Laterality: N/A;    HERNIA REPAIR Left     Inguinal    INJECTION OF JOINT Right 2023    Procedure: RT Hip Injection;  Surgeon: Robbin De La Garza DO;  Location: [facility];  Service: Pain Management;  Laterality: Right;  oral - 20 mins    UPPER GASTROINTESTINAL ENDOSCOPY N/A 2022     Family History   Problem Relation Age of Onset    Hypertension Mother     Breast cancer Mother     Hypertension Father     Prostate cancer Father     Bladder Cancer Father      Social History     Tobacco Use    Smoking status: Former     Current packs/day: 0.00     Types: Cigarettes     Quit date:      Years since quittin.2    Smokeless tobacco: Never    Tobacco comments:     quit 20 years ago   Substance Use Topics    Alcohol use: Not Currently     Comment: Quit drinking 21    Drug use: Not Currently     Review of Systems   Constitutional:  Negative for chills and fever.   HENT:  Positive for nosebleeds. Negative  for sore throat.    Respiratory:  Negative for shortness of breath.    Cardiovascular:  Negative for chest pain.   Gastrointestinal:  Positive for blood in stool and vomiting. Negative for abdominal pain and nausea.   Genitourinary:  Negative for dysuria.   Musculoskeletal:  Negative for back pain.   Skin:  Negative for rash.   Neurological:  Negative for weakness and headaches.   Hematological:  Does not bruise/bleed easily.   All other systems reviewed and are negative.      Physical Exam     Initial Vitals [03/27/24 0818]   BP Pulse Resp Temp SpO2   (!) 202/102 105 18 98.2 °F (36.8 °C) 98 %      MAP       --         Physical Exam    Nursing note and vitals reviewed.  Constitutional: He appears well-developed and well-nourished. He is not diaphoretic. No distress.   HENT:   Head: Normocephalic and atraumatic.   Mouth/Throat: Oropharynx is clear and moist. No oropharyngeal exudate.   Patient has Kiesselbach's plexus/anterior epistaxis noted which is mild.  This is in the right nare   Eyes: Conjunctivae and EOM are normal. Pupils are equal, round, and reactive to light.   Neck: No tracheal deviation present.   Cardiovascular:  Regular rhythm, normal heart sounds and intact distal pulses.           No murmur heard.  Borderline tachycardic   Pulmonary/Chest: Breath sounds normal. No stridor. No respiratory distress. He has no wheezes. He has no rhonchi. He has no rales.   Abdominal: Abdomen is soft. Bowel sounds are normal. He exhibits no distension. There is no abdominal tenderness. There is no rebound and no guarding.   Musculoskeletal:         General: No tenderness or edema. Normal range of motion.     Neurological: He is alert and oriented to person, place, and time. He has normal strength. No cranial nerve deficit or sensory deficit. GCS score is 15. GCS eye subscore is 4. GCS verbal subscore is 5. GCS motor subscore is 6.   Skin: Skin is warm and dry. Capillary refill takes less than 2 seconds. No rash noted.  No erythema. No pallor.   Psychiatric: He has a normal mood and affect. His behavior is normal. Thought content normal.         ED Course   Procedures  Labs Reviewed   CBC W/ AUTO DIFFERENTIAL - Abnormal; Notable for the following components:       Result Value    RBC 3.42 (*)     Hemoglobin 11.9 (*)     Hematocrit 36.2 (*)      (*)     MCH 34.8 (*)     RDW 17.2 (*)     Platelets 69 (*)     Immature Granulocytes 1.1 (*)     Immature Grans (Abs) 0.07 (*)     All other components within normal limits   COMPREHENSIVE METABOLIC PANEL - Abnormal; Notable for the following components:    BUN 31 (*)     Creatinine 1.6 (*)     Total Protein 8.6 (*)     Total Bilirubin 3.9 (*)     AST 47 (*)     eGFR 52.5 (*)     All other components within normal limits   PROTIME-INR - Abnormal; Notable for the following components:    Prothrombin Time 13.1 (*)     All other components within normal limits   LIPASE - Abnormal; Notable for the following components:    Lipase 67 (*)     All other components within normal limits   OCCULT BLOOD X 1, STOOL - Abnormal; Notable for the following components:    Occult Blood Positive (*)     All other components within normal limits   ALCOHOL,MEDICAL (ETHANOL)   APTT   URINALYSIS, REFLEX TO URINE CULTURE   ALCOHOL,MEDICAL (ETHANOL)   HEMATOCRIT   TYPE & SCREEN          Results for orders placed or performed during the hospital encounter of 03/27/24   CBC auto differential   Result Value Ref Range    WBC 6.16 3.90 - 12.70 K/uL    RBC 3.42 (L) 4.60 - 6.20 M/uL    Hemoglobin 11.9 (L) 14.0 - 18.0 g/dL    Hematocrit 36.2 (L) 40.0 - 54.0 %     (H) 82 - 98 fL    MCH 34.8 (H) 27.0 - 31.0 pg    MCHC 32.9 32.0 - 36.0 g/dL    RDW 17.2 (H) 11.5 - 14.5 %    Platelets 69 (L) 150 - 450 K/uL    MPV 10.8 9.2 - 12.9 fL    Immature Granulocytes 1.1 (H) 0.0 - 0.5 %    Gran # (ANC) 4.1 1.8 - 7.7 K/uL    Immature Grans (Abs) 0.07 (H) 0.00 - 0.04 K/uL    Lymph # 1.6 1.0 - 4.8 K/uL    Mono # 0.4 0.3 - 1.0  K/uL    Eos # 0.0 0.0 - 0.5 K/uL    Baso # 0.03 0.00 - 0.20 K/uL    nRBC 0 0 /100 WBC    Gran % 66.1 38.0 - 73.0 %    Lymph % 25.2 18.0 - 48.0 %    Mono % 6.8 4.0 - 15.0 %    Eosinophil % 0.3 0.0 - 8.0 %    Basophil % 0.5 0.0 - 1.9 %    Differential Method Automated    Comprehensive metabolic panel   Result Value Ref Range    Sodium 140 136 - 145 mmol/L    Potassium 4.1 3.5 - 5.1 mmol/L    Chloride 104 95 - 110 mmol/L    CO2 25 23 - 29 mmol/L    Glucose 108 70 - 110 mg/dL    BUN 31 (H) 6 - 20 mg/dL    Creatinine 1.6 (H) 0.5 - 1.4 mg/dL    Calcium 8.9 8.7 - 10.5 mg/dL    Total Protein 8.6 (H) 6.0 - 8.4 g/dL    Albumin 3.8 3.5 - 5.2 g/dL    Total Bilirubin 3.9 (H) 0.1 - 1.0 mg/dL    Alkaline Phosphatase 102 55 - 135 U/L    AST 47 (H) 10 - 40 U/L    ALT 26 10 - 44 U/L    eGFR 52.5 (A) >60 mL/min/1.73 m^2    Anion Gap 11 8 - 16 mmol/L   Protime-INR   Result Value Ref Range    Prothrombin Time 13.1 (H) 9.0 - 12.5 sec    INR 1.2 0.8 - 1.2   Lipase   Result Value Ref Range    Lipase 67 (H) 4 - 60 U/L   Occult blood x 1, stool    Specimen: Stool   Result Value Ref Range    Occult Blood Positive (A) Negative     No orders to display       Imaging Results    None          Medications   silver nitrate applicators applicator 2 applicator (has no administration in time range)   pantoprazole 40 mg in  mL infusion (ready to mix) (8 mg/hr Intravenous New Bag 3/27/24 1542)   octreotide (SANDOSTATIN) 500 mcg in sodium chloride 0.9% 100 mL infusion (50 mcg/hr Intravenous New Bag 3/27/24 1542)   LORazepam injection 2 mg (2 mg Intravenous Given 3/27/24 1522)   labetaloL injection 10 mg (has no administration in time range)   sodium chloride 0.9% flush 10 mL (has no administration in time range)   melatonin tablet 6 mg (has no administration in time range)   ondansetron injection 4 mg (has no administration in time range)   aluminum-magnesium hydroxide-simethicone 200-200-20 mg/5 mL suspension 30 mL (has no administration in time  range)   naloxone 0.4 mg/mL injection 0.02 mg (has no administration in time range)   potassium bicarbonate disintegrating tablet 50 mEq (has no administration in time range)   potassium bicarbonate disintegrating tablet 35 mEq (has no administration in time range)   potassium bicarbonate disintegrating tablet 60 mEq (has no administration in time range)   magnesium oxide tablet 800 mg (has no administration in time range)   magnesium oxide tablet 800 mg (has no administration in time range)   potassium, sodium phosphates 280-160-250 mg packet 2 packet (has no administration in time range)   potassium, sodium phosphates 280-160-250 mg packet 2 packet (has no administration in time range)   potassium, sodium phosphates 280-160-250 mg packet 2 packet (has no administration in time range)   sodium chloride 0.9% 1,000 mL with mvi, (ADULT) no.4 with vit K 3,300 unit- 150 mcg/10 mL 10 mL, thiamine 100 mg, folic acid 1 mg infusion (has no administration in time range)   oxymetazoline 0.05 % nasal spray 1 spray (1 spray Each Nostril Given 3/27/24 1318)   pantoprazole injection 80 mg (80 mg Intravenous Given 3/27/24 1402)   octreotide injection 100 mcg (100 mcg Intravenous Given 3/27/24 1400)     Medical Decision Making  Differential includes but not limited to GI bleeding, epistaxis, coagulopathy, anemia, dehydration, fatigue, alcohol abuse, alcohol intoxication    Emergent evaluation of a 48-year-old male presents emergency department epistaxis and also with GI bleeding.  Patient has a history of cirrhosis.  Patient emergency department with evidence of anemia, thrombocytopenia.  Patient is around baseline however but platelets are slightly lower than with the have been.  Patient gives history of 2 episodes of black stool today.  He is heme-positive he does have black stool on rectal exam.  Concern for GI bleeding.  He has been given IV Protonix IV octreotide.  Patient does have history of esophageal varices.  I am  awaiting to hear back from GI regarding scope.  In addition patient has had nosebleed.  Patient has had direct pressure applied to his nose which mostly stopped it.  Also put oxymetazoline spray directly up his nose which helped to stop the nosebleed.  Initially saw an area that was amenable to cauterization but when I rechecked it it stopped bleeding from this area.  Patient will be admitted for trending of H&H and GI consultation may need scope given history of esophageal varices.  At this point he does not need any blood.  He has not acutely hemorrhaging.  He will be admitted for observation/GI consultation.    A dictation software program was used for this note.  Please expect some simple typographical  errors in this note.     Amount and/or Complexity of Data Reviewed  Labs: ordered.    Risk  OTC drugs.  Prescription drug management.              Attending Attestation:             Attending ED Notes:   Attending Critical Care:   Critical Care Times:   Direct Patient Care (initial evaluation, reassessments, and time considering the case)................................................................10 minutes.   Additional History from reviewing old medical records or taking additional history from the family, EMS, PCP, etc.......................10 minutes.   Ordering, Reviewing, and Interpreting Diagnostic Studies...............................................................................................................10 minutes.   Documentation..................................................................................................................................................................................5 minutes.   ==============================================================  · Total Critical Care Time - exclusive of procedural time: 35 minutes.  ==============================================================  Critical care was necessary to treat or prevent imminent or life-threatening  deterioration of the following conditions:  GI bleeding.   Critical care was time spent personally by me on the following activities: obtaining history from patient or relative, examination of patient, review of x-rays / CT sent with the patient, ordering lab, x-rays, and/or EKG, development of treatment plan with patient or relative, ordering and performing treatments and interventions, interpretation of cardiac measurements and re-evaluation of patient's conition.   Critical Care Condition: potentially life-threatening         ED Course as of 03/27/24 1602   Wed Mar 27, 2024   1351 I discussed the case with Kalyn from Hospital Medicine who will admit the patient. [JR]   5113 I discussed the case with GI Dr. Billingsley and they stated that he can have clear liquids and plan to do an EGD tomorrow. [JR]      ED Course User Index  [JR] Johnnie Baker DO                           Clinical Impression:  Final diagnoses:  [K92.2] Gastrointestinal hemorrhage, unspecified gastrointestinal hemorrhage type (Primary)  [R04.0] Right-sided epistaxis          ED Disposition Condition    Admit                 Johnnie Baker DO  03/27/24 1602

## 2024-03-27 NOTE — CONSULTS
GASTROENTEROLOGY INPATIENT CONSULT NOTE  Patient Name: Irvin Diaz Jr.  Patient MRN: [id number]  Patient : 1975    Admit Date: 3/27/2024  Service date: 3/27/2024    Reason for Consult: gi bleed    PCP: Edouard Gibson MD    Chief Complaint   Patient presents with    Epistaxis     Has had nose bleed x 24 hours, throwing up blood. Dark stools and has a hx of esophageal varices        HPI: Patient is a 48-year-old male presented to ED for eval of bleeding. Patient reported he has been having intermittent epistaxis since yesterday. Patient also reported today he had hematemesis and tarry stools. Patient has hx of alcoholic cirrhosis. Patient reported he had not had a drink for about 2 months prior to 2 days ago when he did consume alcohol. Patient has had episodes similar to this in the past and has required transfusions. Denies taking blood thinners or NSAIDs. Denied abd pain. Denied fever. Hgb & hct stable. Noted with thrombocytopenia, appears to be around baseline. Noted to have bright red bloody emesis to emesis bag on exam, epistaxis has ceased. Heme occult positive in ED. GI consulted in ED. Admit to hospital medicine for further eval. .   Spoke with er staff. No bleeding in 8 hours of observation.        Chart review:  EGD 23 : GRADE B esophagitis. No varices. Scar from  prior banding. Moderate gastritis  3/10/23 colonoscopy: ileocolonic anastomosis. Diverticulosis  22: small varices. Scar from prior banding. Severe PHG  22 : egd with samll varices. Moderate PHG. Scar from prior banding  5/3/22: EGD with grade I varices. Grade A esopagitis. Moderate PHG  21 EGD with moderate PHG. Small varices.   21: EGD : gastric ulcer. PHG, small EV  5/3/21: EGD with grade I varices. PHG  21 : colonoscopy with IC anastomosis, ulcerated. Congested mucosa with PHTN  1/3/21 EGD : mild portal HTN  2020 EGD bleeding EV , medium four bands places, PHG  19 EGD : severe gastritis.  Grade II varcies. Grade b esophagitis        Past Medical History:  Past Medical History:   Diagnosis Date    Alcohol withdrawal 5/1/2022    Alcoholic cirrhosis of liver     Alcoholic ketoacidosis 6/6/2021    Arthritis     ATN (acute tubular necrosis)     CHF (congestive heart failure)     Diverticulitis 01/2020    Esophageal varices     GERD (gastroesophageal reflux disease)     GI bleed     Hip arthritis     Left    Hypertension     Liver cirrhosis     Macrocytic anemia     Pulmonary embolism 08/2018    Unprovoked DVT.  Stop Coumadin due to GIB    Thrombocytopenia         Past Surgical History:  Past Surgical History:   Procedure Laterality Date    ANKLE SURGERY      BLOCK, NERVE, PERIPHERAL Left 06/24/2022    Procedure: Left Hip femoral-obturator accessory nerve block;  Surgeon: Robbin De La Garza DO;  Location: [facility] OR;  Service: Pain Management;  Laterality: Left;    COLON SURGERY  2007    COLONOSCOPY  09/05/2019    [facility]    COLONOSCOPY N/A 02/17/2021    Procedure: COLONOSCOPY;  Surgeon: Renea Billingsley MD;  Location: [facility];  Service: Endoscopy;  Laterality: N/A;    COLONOSCOPY N/A 2/13/2023    Procedure: COLONOSCOPY;  Surgeon: Rudy Orellana MD;  Location: [facility] ([address] [place] [address]);  Service: Endoscopy;  Laterality: N/A;  cirrhosis-labs done on 1/11/23  instr portal-GT  No answer for precall- [place]    COLONOSCOPY N/A 3/10/2023    Procedure: COLONOSCOPY;  Surgeon: Rudy Orellana MD;  Location: [facility] ([address] [place] [address]);  Service: Endoscopy;  Laterality: N/A;  inst via poral per pt request    COLONOSCOPY W/ BIOPSIES  02/17/2021    ESOPHAGOGASTRODUODENOSCOPY N/A 07/26/2019    Procedure: EGD (ESOPHAGOGASTRODUODENOSCOPY);  Surgeon: Brian Trivedi MD;  Location: [facility];  Service: Endoscopy;  Laterality: N/A;    ESOPHAGOGASTRODUODENOSCOPY N/A 04/08/2020    Procedure: EGD (ESOPHAGOGASTRODUODENOSCOPY);  Surgeon: Donn Acuna MD;  Location: [facility];  Service: Endoscopy;  Laterality: N/A;    ESOPHAGOGASTRODUODENOSCOPY N/A  01/03/2021    Procedure: EGD (ESOPHAGOGASTRODUODENOSCOPY)- coffee ground emesis, hx varices;  Surgeon: Steve Chairez MD;  Location: [facility];  Service: Endoscopy;  Laterality: N/A;    ESOPHAGOGASTRODUODENOSCOPY N/A 05/03/2021    Procedure: EGD (ESOPHAGOGASTRODUODENOSCOPY);  Surgeon: Jeanmarie Ngo MD;  Location: [facility];  Service: Endoscopy;  Laterality: N/A;    ESOPHAGOGASTRODUODENOSCOPY N/A 07/19/2021    Procedure: ESOPHAGOGASTRODUODENOSCOPY (EGD);  Surgeon: Claudio Medeiros MD;  Location: [facility];  Service: Endoscopy;  Laterality: N/A;    ESOPHAGOGASTRODUODENOSCOPY  12/20/2021    ESOPHAGOGASTRODUODENOSCOPY N/A 12/20/2021    Procedure: EGD (ESOPHAGOGASTRODUODENOSCOPY);  Surgeon: Ajay Jackson MD;  Location: [facility];  Service: Endoscopy;  Laterality: N/A;    ESOPHAGOGASTRODUODENOSCOPY N/A 05/03/2022    Procedure: EGD (ESOPHAGOGASTRODUODENOSCOPY);  Surgeon: Brian Trivedi MD;  Location: [facility];  Service: Endoscopy;  Laterality: N/A;    ESOPHAGOGASTRODUODENOSCOPY N/A 7/7/2022    Procedure: EGD (ESOPHAGOGASTRODUODENOSCOPY);  Surgeon: Ajay Jackson MD;  Location: [facility];  Service: Endoscopy;  Laterality: N/A;    ESOPHAGOGASTRODUODENOSCOPY N/A 11/29/2022    Procedure: EGD (ESOPHAGOGASTRODUODENOSCOPY);  Surgeon: Edouard Maynard MD;  Location: [facility] ([address] [place] [address]);  Service: Endoscopy;  Laterality: N/A;    ESOPHAGOGASTRODUODENOSCOPY N/A 4/28/2023    Procedure: EGD (ESOPHAGOGASTRODUODENOSCOPY);  Surgeon: Caesar Dickens MD;  Location: [facility];  Service: Endoscopy;  Laterality: N/A;    HERNIA REPAIR Left     Inguinal    INJECTION OF JOINT Right 9/28/2023    Procedure: RT Hip Injection;  Surgeon: Robbin De La Garza DO;  Location: [facility];  Service: Pain Management;  Laterality: Right;  oral - 20 mins    UPPER GASTROINTESTINAL ENDOSCOPY N/A 07/07/2022        Home Medications:  (Not in a hospital admission)      Inpatient Medications:   silver nitrate applicators  2  applicator Topical (Top) Once    sodium chloride 0.9% 1,000 mL with mvi, (ADULT) no.4 with vit K 3,300 unit- 150 mcg/10 mL 10 mL, thiamine 100 mg, folic acid 1 mg infusion   Intravenous Once     Review of patient's allergies indicates:   Allergen Reactions    Ciprofloxacin hcl Hallucinations    Meperidine Hives     Pt medicated w/multiple medications (demerol, protonix, and zofran)  w/i 10 mins time frame prior to developing localized hives near IV site that med was administered. Pt reports he has/takes all other medications on daily basis.     Morphine Itching    Nsaids (non-steroidal anti-inflammatory drug)      Kidney Disease    Tylenol [acetaminophen]      Hx of liver disease       Social History:   Social History     Socioeconomic History    Marital status:    Tobacco Use    Smoking status: Former     Current packs/day: 0.00     Types: Cigarettes     Quit date:      Years since quittin.2    Smokeless tobacco: Never    Tobacco comments:     quit 20 years ago   Substance and Sexual Activity    Alcohol use: Not Currently     Comment: Quit drinking 21    Drug use: Not Currently    Sexual activity: Yes     Comment: occ     Social Determinants of Health     Financial Resource Strain: Low Risk  (2022)    Overall Financial Resource Strain (CARDIA)     Difficulty of Paying Living Expenses: Not very hard   Food Insecurity: No Food Insecurity (2022)    Hunger Vital Sign     Worried About Running Out of Food in the Last Year: Never true     Ran Out of Food in the Last Year: Never true   Transportation Needs: No Transportation Needs (2022)    PRAPARE - Transportation     Lack of Transportation (Medical): No     Lack of Transportation (Non-Medical): No   Physical Activity: Inactive (2022)    Exercise Vital Sign     Days of Exercise per Week: 0 days     Minutes of Exercise per Session: 0 min   Stress: No Stress Concern Present (2022)    [employer] - Occupational Stress Questionnaire     Feeling of Stress : Not at all   Social Connections: Moderately Integrated (11/29/2022)    Social Connection and Isolation Panel [NHANES]     Frequency of Communication with Friends and Family: More than three times a week     Frequency of Social Gatherings with Friends and Family: More than three times a week     Attends Temple Services: Never     Active Member of Clubs or Organizations: Yes     Attends Club or Organization Meetings: Never     Marital Status:    Housing Stability: Low Risk  (11/29/2022)    Housing Stability Vital Sign     Unable to Pay for Housing in the Last Year: No     Number of Places Lived in the Last Year: 1     Unstable Housing in the Last Year: No       Family History:   Family History   Problem Relation Age of Onset    Hypertension Mother     Breast cancer Mother     Hypertension Father     Prostate cancer Father     Bladder Cancer Father        Review of Systems:  A 10 point review of systems was performed and was normal, except as mentioned in the HPI, including constitutional, HEENT, heme, lymph, cardiovascular, respiratory, gastrointestinal, genitourinary, neurologic, endocrine, psychiatric and musculoskeletal.      OBJECTIVE:    Physical Exam:  24 Hour Vital Sign Ranges: Temp:  [98 °F (36.7 °C)-98.2 °F (36.8 °C)] 98 °F (36.7 °C)  Pulse:  [] 84  Resp:  [16-18] 16  SpO2:  [98 %] 98 %  BP: (179-202)/() 179/85  Most recent vitals: BP (!) 179/85   Pulse 84   Temp 98 °F (36.7 °C)   Resp 16   Ht 6' (1.829 m)   Wt 106.6 kg (235 lb)   SpO2 98%   BMI 31.87 kg/m²    GEN: well-developed, well-nourished, awake and alert, non-toxic appearing adult  HEENT: PERRL, sclera anicteric, oral mucosa pink and moist without lesion  NECK: trachea midline; Good ROM  CV: regular rate and rhythm, no murmurs or gallops  RESP: clear to auscultation bilaterally, no wheezes, rhonci or rales  ABD: soft, non-tender, non-distended, normal bowel  "sounds  EXT: no swelling or edema, 2+ pulses distally  SKIN: no rashes or jaundice  PSYCH: normal affect    Labs:   Recent Labs     03/27/24  0850   WBC 6.16   HGB 11.9*   *   PLT 69*     Recent Labs     03/27/24  0850      K 4.1      CO2 25   BUN 31*        No results for input(s): "ALB" in the last 72 hours.    Invalid input(s): "ALKP", "SGOT", "SGPT", "TBIL", "DBIL", "TPRO"  Recent Labs     03/27/24  0850   INR 1.2          Radiology Review:  No orders to display      No results found for this or any previous visit (from the past 2160 hour(s)).   No results found for this or any previous visit (from the past 2160 hour(s)).       IMPRESSION / RECOMMENDATIONS:     Active Problem List with Overview Notes    Diagnosis Date Noted    Gastrointestinal hemorrhage 03/27/2024    HTN (hypertension) 03/27/2024    Anemia in chronic kidney disease (CKD) 12/22/2022    Secondary esophageal varices without bleeding 11/29/2022    Melena 11/28/2022    Long-term use of high-risk medication 10/06/2022    Alcohol use disorder, severe, dependence 07/19/2022    Encounter for pre-transplant evaluation for liver transplant 07/19/2022    Hepatic encephalopathy     Malnutrition 05/21/2022    Chronic left hip pain 03/24/2022    Dilation of common bile duct 07/21/2021    Hyperbilirubinemia 07/16/2021    Ascites due to alcoholic cirrhosis 06/06/2021    Diastolic dysfunction 06/06/2021    Jaundice 06/06/2021    Colitis 02/16/2021    Alcohol abuse 02/16/2021    History of GI bleed 02/15/2021    Arm pain 10/20/2020    CKD (chronic kidney disease) stage 3, GFR 30-59 ml/min 04/08/2020    Hip arthritis 04/08/2020    Essential hypertension     Alcoholic cirrhosis of liver     Elevated lipase 01/11/2020    Hydronephrosis of right kidney 07/25/2019    Transaminitis 07/25/2019    Coagulopathy 05/11/2019    Thrombocytopenia 05/11/2019    History of pulmonary embolus (PE) 05/11/2019     Oct 2018      AVN (avascular necrosis of " bone) 05/11/2019     Alcohol abuse  Banana bag  CIWA precautions  Ativan PRN withdrawal  Tele monitoring      Gastrointestinal hemorrhage  Consult Gastroenterologist - follow recommendations.  NPO.  Follow H/H closely, serial h&h q6h x 4.   Type and screen, transfuse blood as needed hgb<7.  IV Protonix and Octreotide transfusions, monitor plts closely  IV hydration.  IV antiemetics prn.       CKD (chronic kidney disease) stage 3, GFR 30-59 ml/min  Creatine stable for now. BMP reviewed- noted Estimated Creatinine Clearance: 70.5 mL/min (A) (based on SCr of 1.6 mg/dL (H)). according to latest data. Based on current GFR, CKD stage is stage 3 - GFR 30-59.  Monitor UOP and serial BMP and adjust therapy as needed. Renally dose meds. Avoid nephrotoxic medications and procedures.    HTN (hypertension)  Chronic, uncontrolled. Latest blood pressure and vitals reviewed-     Temp:  [98.2 °F (36.8 °C)]   Pulse:  [105]   Resp:  [18]   BP: (202)/(102)   SpO2:  [98 %] .   Home meds for hypertension were reviewed and noted below.   Hypertension Medications               amLODIPine (NORVASC) 10 MG tablet Take 1 tablet (10 mg total) by mouth once daily.    carvediloL (COREG) 12.5 MG tablet Take 1 tablet (12.5 mg total) by mouth 2 (two) times daily with meals.    furosemide (LASIX) 20 MG tablet Take 1 tablet (20 mg total) by mouth once daily.    spironolactone (ALDACTONE) 50 MG tablet Take 1 tablet (50 mg total) by mouth once daily.            While in the hospital, will manage blood pressure as follows; Adjust home antihypertensive regimen as follows- IV labetalol, holding home PO meds given varices and active hematemesis    Will utilize p.r.n. blood pressure medication only if patient's blood pressure greater than 180/110 and he develops symptoms such as worsening chest pain or shortness of breath.    Thrombocytopenia  Patient was found to have thrombocytopenia, the likely etiology is secondary to cirrhosis/portal hypertension, will  monitor the platelets Daily. Will transfuse if platelet count is <10k. Hold DVT prophylaxis if platelets are <50k. The patient's platelet results have been reviewed and are listed below.  Recent Labs   Lab 03/27/24  0850   PLT 69*         Alcoholic cirrhosis of liver  Patient with known Cirrhosis with Child's class B. Co-morbidities are present and inclusive of esophageal varices and anemia.  MELD-Na score calculated; MELD 3.0: 19 at 3/27/2024  8:50 AM  MELD-Na: 18 at 3/27/2024  8:50 AM  Calculated from:  Serum Creatinine: 1.6 mg/dL at 3/27/2024  8:50 AM  Serum Sodium: 140 mmol/L (Using max of 137 mmol/L) at 3/27/2024  8:50 AM  Total Bilirubin: 3.9 mg/dL at 3/27/2024  8:50 AM  Serum Albumin: 3.8 g/dL (Using max of 3.5 g/dL) at 3/27/2024  8:50 AM  INR(ratio): 1.2 at 3/27/2024  8:50 AM  Age at listing (hypothetical): 48 years  Sex: Male at 3/27/2024  8:50 AM      Continue chronic meds. Etiology likely ETOH. Will avoid any hepatotoxic meds, and monitor CBC/CMP/INR for synthetic function.        Clear liquid diet now, npo after midnight.  EGD in am  Thank you for this consult.    Renea Billingsley  3/27/2024  3:54 PM

## 2024-03-28 ENCOUNTER — ANESTHESIA EVENT (OUTPATIENT)
Dept: SURGERY | Facility: HOSPITAL | Age: 49
DRG: 151 | End: 2024-03-28
Payer: MEDICARE

## 2024-03-28 ENCOUNTER — ANESTHESIA (OUTPATIENT)
Dept: SURGERY | Facility: HOSPITAL | Age: 49
DRG: 151 | End: 2024-03-28
Payer: MEDICARE

## 2024-03-28 VITALS
SYSTOLIC BLOOD PRESSURE: 88 MMHG | DIASTOLIC BLOOD PRESSURE: 52 MMHG | RESPIRATION RATE: 12 BRPM | OXYGEN SATURATION: 100 % | HEART RATE: 77 BPM

## 2024-03-28 LAB
ALBUMIN SERPL BCP-MCNC: 3.1 G/DL (ref 3.5–5.2)
ALP SERPL-CCNC: 75 U/L (ref 55–135)
ALT SERPL W/O P-5'-P-CCNC: 18 U/L (ref 10–44)
ANION GAP SERPL CALC-SCNC: 6 MMOL/L (ref 8–16)
ANION GAP SERPL CALC-SCNC: 7 MMOL/L (ref 8–16)
AST SERPL-CCNC: 33 U/L (ref 10–40)
BASOPHILS # BLD AUTO: 0.02 K/UL (ref 0–0.2)
BASOPHILS NFR BLD: 0.4 % (ref 0–1.9)
BILIRUB SERPL-MCNC: 3.2 MG/DL (ref 0.1–1)
BUN SERPL-MCNC: 22 MG/DL (ref 6–20)
BUN SERPL-MCNC: 29 MG/DL (ref 6–20)
CALCIUM SERPL-MCNC: 8.3 MG/DL (ref 8.7–10.5)
CALCIUM SERPL-MCNC: 8.4 MG/DL (ref 8.7–10.5)
CHLORIDE SERPL-SCNC: 105 MMOL/L (ref 95–110)
CHLORIDE SERPL-SCNC: 106 MMOL/L (ref 95–110)
CO2 SERPL-SCNC: 26 MMOL/L (ref 23–29)
CO2 SERPL-SCNC: 26 MMOL/L (ref 23–29)
CREAT SERPL-MCNC: 1.7 MG/DL (ref 0.5–1.4)
CREAT SERPL-MCNC: 1.8 MG/DL (ref 0.5–1.4)
DIFFERENTIAL METHOD BLD: ABNORMAL
EOSINOPHIL # BLD AUTO: 0.1 K/UL (ref 0–0.5)
EOSINOPHIL NFR BLD: 1.5 % (ref 0–8)
ERYTHROCYTE [DISTWIDTH] IN BLOOD BY AUTOMATED COUNT: 17.4 % (ref 11.5–14.5)
ERYTHROCYTE [DISTWIDTH] IN BLOOD BY AUTOMATED COUNT: 17.4 % (ref 11.5–14.5)
EST. GFR  (NO RACE VARIABLE): 45.6 ML/MIN/1.73 M^2
EST. GFR  (NO RACE VARIABLE): 48.8 ML/MIN/1.73 M^2
GLUCOSE SERPL-MCNC: 108 MG/DL (ref 70–110)
GLUCOSE SERPL-MCNC: 122 MG/DL (ref 70–110)
HCT VFR BLD AUTO: 27 % (ref 40–54)
HCT VFR BLD AUTO: 28.9 % (ref 40–54)
HGB BLD-MCNC: 10.4 G/DL (ref 14–18)
HGB BLD-MCNC: 9.1 G/DL (ref 14–18)
HGB BLD-MCNC: 9.1 G/DL (ref 14–18)
HGB BLD-MCNC: 9.7 G/DL (ref 14–18)
HGB BLD-MCNC: 9.8 G/DL (ref 14–18)
IMM GRANULOCYTES # BLD AUTO: 0.02 K/UL (ref 0–0.04)
IMM GRANULOCYTES NFR BLD AUTO: 0.4 % (ref 0–0.5)
INR PPP: 1.2 (ref 0.8–1.2)
LYMPHOCYTES # BLD AUTO: 1.4 K/UL (ref 1–4.8)
LYMPHOCYTES NFR BLD: 25 % (ref 18–48)
MAGNESIUM SERPL-MCNC: 1.7 MG/DL (ref 1.6–2.6)
MAGNESIUM SERPL-MCNC: 1.8 MG/DL (ref 1.6–2.6)
MCH RBC QN AUTO: 35.9 PG (ref 27–31)
MCH RBC QN AUTO: 36.1 PG (ref 27–31)
MCHC RBC AUTO-ENTMCNC: 33.6 G/DL (ref 32–36)
MCHC RBC AUTO-ENTMCNC: 33.7 G/DL (ref 32–36)
MCV RBC AUTO: 107 FL (ref 82–98)
MCV RBC AUTO: 107 FL (ref 82–98)
MONOCYTES # BLD AUTO: 0.4 K/UL (ref 0.3–1)
MONOCYTES NFR BLD: 7.7 % (ref 4–15)
NEUTROPHILS # BLD AUTO: 3.6 K/UL (ref 1.8–7.7)
NEUTROPHILS NFR BLD: 65 % (ref 38–73)
NRBC BLD-RTO: 0 /100 WBC
PLATELET # BLD AUTO: 50 K/UL (ref 150–450)
PLATELET # BLD AUTO: 52 K/UL (ref 150–450)
PMV BLD AUTO: 10.7 FL (ref 9.2–12.9)
PMV BLD AUTO: 10.9 FL (ref 9.2–12.9)
POTASSIUM SERPL-SCNC: 3.7 MMOL/L (ref 3.5–5.1)
POTASSIUM SERPL-SCNC: 3.9 MMOL/L (ref 3.5–5.1)
PROT SERPL-MCNC: 6.7 G/DL (ref 6–8.4)
PROTHROMBIN TIME: 13.7 SEC (ref 9–12.5)
RBC # BLD AUTO: 2.52 M/UL (ref 4.6–6.2)
RBC # BLD AUTO: 2.7 M/UL (ref 4.6–6.2)
SODIUM SERPL-SCNC: 138 MMOL/L (ref 136–145)
SODIUM SERPL-SCNC: 138 MMOL/L (ref 136–145)
TROPONIN I SERPL HS-MCNC: 18 PG/ML (ref 0–14.9)
WBC # BLD AUTO: 5.34 K/UL (ref 3.9–12.7)
WBC # BLD AUTO: 5.49 K/UL (ref 3.9–12.7)

## 2024-03-28 PROCEDURE — 83735 ASSAY OF MAGNESIUM: CPT | Mod: 91 | Performed by: INTERNAL MEDICINE

## 2024-03-28 PROCEDURE — D9220A PRA ANESTHESIA: Mod: ANES,,, | Performed by: ANESTHESIOLOGY

## 2024-03-28 PROCEDURE — C9113 INJ PANTOPRAZOLE SODIUM, VIA: HCPCS

## 2024-03-28 PROCEDURE — 36415 COLL VENOUS BLD VENIPUNCTURE: CPT

## 2024-03-28 PROCEDURE — 43235 EGD DIAGNOSTIC BRUSH WASH: CPT | Performed by: INTERNAL MEDICINE

## 2024-03-28 PROCEDURE — 85610 PROTHROMBIN TIME: CPT

## 2024-03-28 PROCEDURE — 37000009 HC ANESTHESIA EA ADD 15 MINS: Performed by: INTERNAL MEDICINE

## 2024-03-28 PROCEDURE — 85018 HEMOGLOBIN: CPT

## 2024-03-28 PROCEDURE — 37000008 HC ANESTHESIA 1ST 15 MINUTES: Performed by: INTERNAL MEDICINE

## 2024-03-28 PROCEDURE — 63600175 PHARM REV CODE 636 W HCPCS

## 2024-03-28 PROCEDURE — 80053 COMPREHEN METABOLIC PANEL: CPT

## 2024-03-28 PROCEDURE — 36415 COLL VENOUS BLD VENIPUNCTURE: CPT | Performed by: INTERNAL MEDICINE

## 2024-03-28 PROCEDURE — 63600175 PHARM REV CODE 636 W HCPCS: Performed by: INTERNAL MEDICINE

## 2024-03-28 PROCEDURE — 25000003 PHARM REV CODE 250

## 2024-03-28 PROCEDURE — 12000002 HC ACUTE/MED SURGE SEMI-PRIVATE ROOM

## 2024-03-28 PROCEDURE — 0DJ08ZZ INSPECTION OF UPPER INTESTINAL TRACT, VIA NATURAL OR ARTIFICIAL OPENING ENDOSCOPIC: ICD-10-PCS | Performed by: INTERNAL MEDICINE

## 2024-03-28 PROCEDURE — 85025 COMPLETE CBC W/AUTO DIFF WBC: CPT

## 2024-03-28 PROCEDURE — 93005 ELECTROCARDIOGRAM TRACING: CPT | Performed by: GENERAL PRACTICE

## 2024-03-28 PROCEDURE — 85027 COMPLETE CBC AUTOMATED: CPT | Performed by: INTERNAL MEDICINE

## 2024-03-28 PROCEDURE — 80048 BASIC METABOLIC PNL TOTAL CA: CPT | Performed by: INTERNAL MEDICINE

## 2024-03-28 PROCEDURE — 63600175 PHARM REV CODE 636 W HCPCS: Performed by: NURSE ANESTHETIST, CERTIFIED REGISTERED

## 2024-03-28 PROCEDURE — 93010 ELECTROCARDIOGRAM REPORT: CPT | Mod: ,,, | Performed by: GENERAL PRACTICE

## 2024-03-28 PROCEDURE — D9220A PRA ANESTHESIA: Mod: CRNA,,, | Performed by: NURSE ANESTHETIST, CERTIFIED REGISTERED

## 2024-03-28 PROCEDURE — 25000003 PHARM REV CODE 250: Performed by: INTERNAL MEDICINE

## 2024-03-28 PROCEDURE — 84484 ASSAY OF TROPONIN QUANT: CPT | Performed by: INTERNAL MEDICINE

## 2024-03-28 PROCEDURE — 83735 ASSAY OF MAGNESIUM: CPT

## 2024-03-28 RX ORDER — LABETALOL HYDROCHLORIDE 5 MG/ML
10 INJECTION, SOLUTION INTRAVENOUS ONCE
Status: COMPLETED | OUTPATIENT
Start: 2024-03-28 | End: 2024-03-28

## 2024-03-28 RX ORDER — METOPROLOL TARTRATE 50 MG/1
50 TABLET ORAL 2 TIMES DAILY
Status: DISCONTINUED | OUTPATIENT
Start: 2024-03-28 | End: 2024-04-01 | Stop reason: HOSPADM

## 2024-03-28 RX ORDER — TRAMADOL HYDROCHLORIDE 50 MG/1
50 TABLET ORAL ONCE
Status: COMPLETED | OUTPATIENT
Start: 2024-03-28 | End: 2024-03-28

## 2024-03-28 RX ORDER — PROPOFOL 10 MG/ML
VIAL (ML) INTRAVENOUS
Status: DISCONTINUED | OUTPATIENT
Start: 2024-03-28 | End: 2024-03-28

## 2024-03-28 RX ADMIN — OCTREOTIDE ACETATE 50 MCG/HR: 500 INJECTION, SOLUTION INTRAVENOUS; SUBCUTANEOUS at 06:03

## 2024-03-28 RX ADMIN — TRAMADOL HYDROCHLORIDE 50 MG: 50 TABLET, COATED ORAL at 06:03

## 2024-03-28 RX ADMIN — Medication 800 MG: at 06:03

## 2024-03-28 RX ADMIN — Medication 800 MG: at 10:03

## 2024-03-28 RX ADMIN — HYDROMORPHONE HYDROCHLORIDE 1 MG: 1 INJECTION, SOLUTION INTRAMUSCULAR; INTRAVENOUS; SUBCUTANEOUS at 06:03

## 2024-03-28 RX ADMIN — POTASSIUM BICARBONATE 50 MEQ: 977.5 TABLET, EFFERVESCENT ORAL at 12:03

## 2024-03-28 RX ADMIN — HYDROMORPHONE HYDROCHLORIDE 1 MG: 1 INJECTION, SOLUTION INTRAMUSCULAR; INTRAVENOUS; SUBCUTANEOUS at 12:03

## 2024-03-28 RX ADMIN — Medication 100 MG: at 11:03

## 2024-03-28 RX ADMIN — SODIUM CHLORIDE, SODIUM LACTATE, POTASSIUM CHLORIDE, AND CALCIUM CHLORIDE: .6; .31; .03; .02 INJECTION, SOLUTION INTRAVENOUS at 11:03

## 2024-03-28 RX ADMIN — Medication 20 MG: at 11:03

## 2024-03-28 RX ADMIN — LORAZEPAM 2 MG: 2 INJECTION INTRAMUSCULAR; INTRAVENOUS at 08:03

## 2024-03-28 RX ADMIN — OCTREOTIDE ACETATE 50 MCG/HR: 500 INJECTION, SOLUTION INTRAVENOUS; SUBCUTANEOUS at 04:03

## 2024-03-28 RX ADMIN — LABETALOL HYDROCHLORIDE 10 MG: 5 INJECTION, SOLUTION INTRAVENOUS at 02:03

## 2024-03-28 RX ADMIN — PANTOPRAZOLE SODIUM 8 MG/HR: 40 INJECTION, POWDER, FOR SOLUTION INTRAVENOUS at 01:03

## 2024-03-28 RX ADMIN — PANTOPRAZOLE SODIUM 8 MG/HR: 40 INJECTION, POWDER, FOR SOLUTION INTRAVENOUS at 12:03

## 2024-03-28 RX ADMIN — METOPROLOL TARTRATE 50 MG: 50 TABLET, FILM COATED ORAL at 08:03

## 2024-03-28 RX ADMIN — PANTOPRAZOLE SODIUM 8 MG/HR: 40 INJECTION, POWDER, FOR SOLUTION INTRAVENOUS at 06:03

## 2024-03-28 RX ADMIN — PANTOPRAZOLE SODIUM 8 MG/HR: 40 INJECTION, POWDER, FOR SOLUTION INTRAVENOUS at 11:03

## 2024-03-28 RX ADMIN — HYDROMORPHONE HYDROCHLORIDE 1 MG: 1 INJECTION, SOLUTION INTRAMUSCULAR; INTRAVENOUS; SUBCUTANEOUS at 07:03

## 2024-03-28 NOTE — ASSESSMENT & PLAN NOTE
GI plans EGD today  NPO.  Follow H/H closely, serial h&h q6h x 4.   Type and screen, transfuse blood as needed hgb<7.  IV Protonix and Octreotide transfusions, monitor plts closely  IV hydration.  IV antiemetics prn.

## 2024-03-28 NOTE — HOSPITAL COURSE
The patient was admitted and observed for any bleeding, persistent vomiting or change in blood pressure.  He was continued on octreotide and ppi. GI was consulted and planned an EGD. The patient has chronic thrombocytopenia, and this was monitored closely, with the plan to transfuse platelets for count less than 10,000 and stop dvt prophylaxis for count less then 50,000. EGD was done and revealed no acute findings. The patient had a 15-beat run of V-tach on 03/29. EKG was ordered and revealed no change from previous EKG's, with prolonged QT interval. Echo and cardiology consult were ordered. Patient placed on lopressor.  And did not have further episodes V-tach.  He was cleared for discharge by Cardiology.  Patient was discharged home with referral to ENT for epistaxis.  He will also follow up with PCP and Cardiology.

## 2024-03-28 NOTE — PROGRESS NOTES
Discussed case with patient prior to endoscopy.  Discussed risks of endoscopy including bleeding, need for banding, failed banding, ulcer post banding. Pt agrees to proceed.

## 2024-03-28 NOTE — SUBJECTIVE & OBJECTIVE
Interval History: Patient without bleeding since admission    Review of Systems   Constitutional:  Negative for activity change, appetite change, chills and fever.   HENT:  Negative for congestion, ear pain, nosebleeds and sinus pain.    Eyes:  Negative for discharge and itching.   Respiratory:  Negative for apnea, cough, chest tightness and shortness of breath.    Cardiovascular:  Negative for chest pain, palpitations and leg swelling.   Gastrointestinal:  Positive for abdominal pain, blood in stool, diarrhea, nausea and vomiting. Negative for abdominal distention.   Genitourinary:  Negative for difficulty urinating, dysuria, flank pain and frequency.   Musculoskeletal:  Negative for arthralgias, back pain, joint swelling and myalgias.   Skin:  Negative for color change, pallor and rash.   Neurological:  Negative for dizziness, weakness, light-headedness and headaches.   Psychiatric/Behavioral:  Negative for agitation, behavioral problems, confusion and suicidal ideas.      Objective:     Vital Signs (Most Recent):  Temp: 97.3 °F (36.3 °C) (03/28/24 1148)  Pulse: 63 (03/28/24 1207)  Resp: (!) 3 (03/28/24 1207)  BP: 127/66 (03/28/24 1200)  SpO2: 97 % (03/28/24 1207) Vital Signs (24h Range):  Temp:  [97.3 °F (36.3 °C)-98.3 °F (36.8 °C)] 97.3 °F (36.3 °C)  Pulse:  [62-93] 63  Resp:  [3-33] 3  SpO2:  [95 %-100 %] 97 %  BP: ()/() 127/66     Weight: 106.6 kg (235 lb)  Body mass index is 31.87 kg/m².    Intake/Output Summary (Last 24 hours) at 3/28/2024 1223  Last data filed at 3/28/2024 1146  Gross per 24 hour   Intake 300 ml   Output 775 ml   Net -475 ml         Physical Exam  Vitals reviewed.   Constitutional:       General: He is not in acute distress.     Appearance: Normal appearance. He is normal weight. He is not ill-appearing.   HENT:      Head: Normocephalic and atraumatic.      Nose: Nose normal.      Mouth/Throat:      Mouth: Mucous membranes are moist.      Pharynx: Oropharynx is clear.   Eyes:       General: No scleral icterus.     Extraocular Movements: Extraocular movements intact.      Conjunctiva/sclera: Conjunctivae normal.      Pupils: Pupils are equal, round, and reactive to light.   Cardiovascular:      Rate and Rhythm: Normal rate and regular rhythm.      Pulses: Normal pulses.      Heart sounds: Normal heart sounds. No murmur heard.     No gallop.   Pulmonary:      Effort: Pulmonary effort is normal. No respiratory distress.      Breath sounds: Normal breath sounds. No wheezing, rhonchi or rales.   Chest:      Chest wall: No tenderness.   Abdominal:      General: Abdomen is flat. There is no distension.      Palpations: Abdomen is soft.      Tenderness: There is no abdominal tenderness.   Musculoskeletal:         General: No swelling or tenderness.      Cervical back: Normal range of motion and neck supple. No rigidity or tenderness.      Right lower leg: No edema.      Left lower leg: No edema.   Skin:     General: Skin is warm and dry.   Neurological:      General: No focal deficit present.      Mental Status: He is alert and oriented to person, place, and time. Mental status is at baseline.      Motor: No weakness.   Psychiatric:         Mood and Affect: Mood normal.         Behavior: Behavior normal.         Thought Content: Thought content normal.             Significant Labs: All pertinent labs within the past 24 hours have been reviewed.  BMP:   Recent Labs   Lab 03/28/24  0607   *      K 3.7      CO2 26   BUN 29*   CREATININE 1.7*   CALCIUM 8.3*   MG 1.7     CBC:   Recent Labs   Lab 03/27/24  0850 03/27/24  1935 03/27/24  2348 03/28/24  0607   WBC 6.16  --   --  5.49   HGB 11.9* 10.7* 9.8* 9.1*  9.1*   HCT 36.2*  --   --  27.0*   PLT 69*  --   --  50*     CMP:   Recent Labs   Lab 03/27/24  0850 03/28/24  0607    138   K 4.1 3.7    105   CO2 25 26    122*   BUN 31* 29*   CREATININE 1.6* 1.7*   CALCIUM 8.9 8.3*   PROT 8.6* 6.7   ALBUMIN 3.8 3.1*   BILITOT  3.9* 3.2*   ALKPHOS 102 75   AST 47* 33   ALT 26 18   ANIONGAP 11 7*       Significant Imaging: I have reviewed all pertinent imaging results/findings within the past 24 hours.

## 2024-03-28 NOTE — ASSESSMENT & PLAN NOTE
Chronic, uncontrolled. Latest blood pressure and vitals reviewed-     Temp:  [97.3 °F (36.3 °C)-98.3 °F (36.8 °C)]   Pulse:  [62-93]   Resp:  [3-33]   BP: ()/()   SpO2:  [95 %-100 %] .   Home meds for hypertension were reviewed and noted below.   Hypertension Medications               amLODIPine (NORVASC) 10 MG tablet Take 1 tablet (10 mg total) by mouth once daily.    carvediloL (COREG) 12.5 MG tablet Take 1 tablet (12.5 mg total) by mouth 2 (two) times daily with meals.    furosemide (LASIX) 20 MG tablet Take 1 tablet (20 mg total) by mouth once daily.    spironolactone (ALDACTONE) 50 MG tablet Take 1 tablet (50 mg total) by mouth once daily.            While in the hospital, will manage blood pressure as follows; Adjust home antihypertensive regimen as follows- IV labetalol, holding home PO meds given varices and active hematemesis    Will utilize p.r.n. blood pressure medication only if patient's blood pressure greater than 180/110 and he develops symptoms such as worsening chest pain or shortness of breath.

## 2024-03-28 NOTE — ASSESSMENT & PLAN NOTE
Patient was found to have thrombocytopenia, the likely etiology is secondary to cirrhosis/portal hypertension, will monitor the platelets Daily. Will transfuse if platelet count is <10k. Hold DVT prophylaxis if platelets are <50k. The patient's platelet results have been reviewed and are listed below.  Recent Labs   Lab 03/28/24  0607   PLT 50*

## 2024-03-28 NOTE — ANESTHESIA PREPROCEDURE EVALUATION
03/28/2024  Irvin Diaz Jr. is a 48 y.o., male.      Patient Active Problem List   Diagnosis    Coagulopathy    Thrombocytopenia    History of pulmonary embolus (PE)    AVN (avascular necrosis of bone)    Hydronephrosis of right kidney    Transaminitis    Elevated lipase    Essential hypertension    Alcoholic cirrhosis of liver    CKD (chronic kidney disease) stage 3, GFR 30-59 ml/min    Hip arthritis    Arm pain    History of GI bleed    Colitis    Alcohol abuse    Ascites due to alcoholic cirrhosis    Diastolic dysfunction    Jaundice    Hyperbilirubinemia    Dilation of common bile duct    Chronic left hip pain    Malnutrition    Alcohol use disorder, severe, dependence    Encounter for pre-transplant evaluation for liver transplant    Hepatic encephalopathy    Long-term use of high-risk medication    Melena    Secondary esophageal varices without bleeding    Anemia in chronic kidney disease (CKD)    Gastrointestinal hemorrhage    HTN (hypertension)       Past Surgical History:   Procedure Laterality Date    ANKLE SURGERY      BLOCK, NERVE, PERIPHERAL Left 06/24/2022    Procedure: Left Hip femoral-obturator accessory nerve block;  Surgeon: Robbin De La Garza DO;  Location: [facility];  Service: Pain Management;  Laterality: Left;    COLON SURGERY  2007    COLONOSCOPY  09/05/2019    [facility]    COLONOSCOPY N/A 02/17/2021    Procedure: COLONOSCOPY;  Surgeon: Renea Billingsley MD;  Location: [facility];  Service: Endoscopy;  Laterality: N/A;    COLONOSCOPY N/A 2/13/2023    Procedure: COLONOSCOPY;  Surgeon: Rudy Orellana MD;  Location: [facility] ([address] [place] [address]);  Service: Endoscopy;  Laterality: N/A;  cirrhosis-labs done on 1/11/23  instr portal-GT  No answer for precall- [place]    COLONOSCOPY N/A 3/10/2023    Procedure: COLONOSCOPY;  Surgeon: Rudy Orellana MD;  [facility]  Service: Endoscopy;   Laterality: N/A;  inst via poral per pt request    COLONOSCOPY W/ BIOPSIES  02/17/2021    ESOPHAGOGASTRODUODENOSCOPY N/A 07/26/2019    Procedure: EGD (ESOPHAGOGASTRODUODENOSCOPY);  Surgeon: Brian Trivedi MD;  Location: [facility];  Service: Endoscopy;  Laterality: N/A;    ESOPHAGOGASTRODUODENOSCOPY N/A 04/08/2020    Procedure: EGD (ESOPHAGOGASTRODUODENOSCOPY);  Surgeon: Donn Acuna MD;  Location: [facility];  Service: Endoscopy;  Laterality: N/A;    ESOPHAGOGASTRODUODENOSCOPY N/A 01/03/2021    Procedure: EGD (ESOPHAGOGASTRODUODENOSCOPY)- coffee ground emesis, hx varices;  Surgeon: Steve Chairez MD;  Location: [facility];  Service: Endoscopy;  Laterality: N/A;    ESOPHAGOGASTRODUODENOSCOPY N/A 05/03/2021    Procedure: EGD (ESOPHAGOGASTRODUODENOSCOPY);  Surgeon: Jeanmarie Ngo MD;  Location: [facility];  Service: Endoscopy;  Laterality: N/A;    ESOPHAGOGASTRODUODENOSCOPY N/A 07/19/2021    Procedure: ESOPHAGOGASTRODUODENOSCOPY (EGD);  Surgeon: Claudio Medeiros MD;  Location: [facility];  Service: Endoscopy;  Laterality: N/A;    ESOPHAGOGASTRODUODENOSCOPY  12/20/2021    ESOPHAGOGASTRODUODENOSCOPY N/A 12/20/2021    Procedure: EGD (ESOPHAGOGASTRODUODENOSCOPY);  Surgeon: Ajay Jackson MD;  Location: [facility];  Service: Endoscopy;  Laterality: N/A;    ESOPHAGOGASTRODUODENOSCOPY N/A 05/03/2022    Procedure: EGD (ESOPHAGOGASTRODUODENOSCOPY);  Surgeon: Brian Trivedi MD;  Location: [facility];  Service: Endoscopy;  Laterality: N/A;    ESOPHAGOGASTRODUODENOSCOPY N/A 7/7/2022    Procedure: EGD (ESOPHAGOGASTRODUODENOSCOPY);  Surgeon: Ajay Jackson MD;  Location: [facility];  Service: Endoscopy;  Laterality: N/A;    ESOPHAGOGASTRODUODENOSCOPY N/A 11/29/2022    Procedure: EGD (ESOPHAGOGASTRODUODENOSCOPY);  Surgeon: Edouard Maynard MD;  Location: [address]);  Service: Endoscopy;  Laterality: N/A;    ESOPHAGOGASTRODUODENOSCOPY N/A 4/28/2023    Procedure: EGD (ESOPHAGOGASTRODUODENOSCOPY);  Surgeon:  Caesar Dickens MD;  Location: [facility];  Service: Endoscopy;  Laterality: N/A;    HERNIA REPAIR Left     Inguinal    INJECTION OF JOINT Right 9/28/2023    Procedure: RT Hip Injection;  Surgeon: Robbin De La Garza DO;  Location: [facility];  Service: Pain Management;  Laterality: Right;  oral - 20 mins    UPPER GASTROINTESTINAL ENDOSCOPY N/A 07/07/2022        Tobacco Use:  The patient  reports that he quit smoking about 24 years ago. His smoking use included cigarettes. He has never used smokeless tobacco.     Results for orders placed or performed during the hospital encounter of 03/27/24   EKG 12-lead    Collection Time: 03/27/24  7:01 PM   Result Value Ref Range    QRS Duration 86 ms    [facility] QTC Calculation 488 ms    Narrative    Test Reason : K92.2,    Vent. Rate : 076 BPM     Atrial Rate : 076 BPM     P-R Int : 144 ms          QRS Dur : 086 ms      QT Int : 434 ms       P-R-T Axes : 013 042 031 degrees     QTc Int : 488 ms    Normal sinus rhythm  Prolonged QT  Abnormal ECG  When compared with ECG of 18-MAY-2023 16:05,  No significant change was found    Referred By: [email or web address]   SELF           Confirmed By:         Imaging Results    None          Lab Results   Component Value Date    WBC 5.49 03/28/2024    HGB 9.1 (L) 03/28/2024    HGB 9.1 (L) 03/28/2024    HCT 27.0 (L) 03/28/2024     (H) 03/28/2024    PLT 50 (L) 03/28/2024     BMP  Lab Results   Component Value Date     03/28/2024    K 3.7 03/28/2024     03/28/2024    CO2 26 03/28/2024    BUN 29 (H) 03/28/2024    CREATININE 1.7 (H) 03/28/2024    CALCIUM 8.3 (L) 03/28/2024    ANIONGAP 7 (L) 03/28/2024     (H) 03/28/2024     03/27/2024     (H) 05/18/2023       Results for orders placed during the hospital encounter of 07/18/22    Echo Saline Bubble? No    Interpretation Summary  · The left ventricle is mildly enlarged with normal systolic function.  · The estimated ejection fraction is 63%.  ·  Indeterminate left ventricular diastolic function.  · Normal right ventricular size with normal right ventricular systolic function.  · Mild left atrial enlargement.  · Mild right atrial enlargement.  · Trivial posterior pericardial effusion. And outside the RA.        Pre-op Assessment    I have reviewed the Patient Summary Reports.     I have reviewed the Nursing Notes. I have reviewed the NPO Status.   I have reviewed the Medications.     Review of Systems  Anesthesia Hx:  No problems with previous Anesthesia             Denies Family Hx of Anesthesia complications.    Denies Personal Hx of Anesthesia complications.                    Social:  Former Smoker, Alcohol Use    Alcohol Use:   Alcohol Abuse:   Hematology/Oncology:    Oncology Normal    -- Anemia: Anemia of Chronic Kidney Disease    acute and macrocytic      --  Thrombocytopenia:            Hematology Comments: History of pulmonary embolus (PE)    Thrombocytopenia                    EENT/Dental:  EENT/Dental Normal           Cardiovascular:     Hypertension, poorly controlled       CHF       ECG has been reviewed.       Congestive Heart Failure (CHF)                Hypertension         Pulmonary:         History of pulmonary embolus (PE)               Renal/:  Chronic Renal Disease   ATN (acute tubular necrosis)     Kidney Function/Disease, Chronic Kidney Disease (CKD) , CKD Stage III (GFR 30-59)          Other Renal / Gu Conditions: Hydronephrosis  Hepatic/GI:     GERD Liver Disease, (Cirrhosis ETOH)  History of GI bleed    Gastrointestinal hemorrhage with hematemesis  Esophageal / Stomach Disorders Gerd, Esophageal Disorder, Esophageal Varices       Liver Disease, Alcoholic Liver Disease  , Cirrhosis  Ascites, Esophageal Varices, Thrombocytopenia    Musculoskeletal:  Arthritis        Bone Disorders:     AVN (avascular necrosis of bone)       Neurological:  Neurology Normal                                      Endocrine:  Endocrine Normal             Dermatological:  Skin Normal    Psych:  Psychiatric Normal                    Physical Exam  General: Well nourished, Cooperative, Alert and Oriented    Airway:  Mallampati: III   Mouth Opening: Normal  TM Distance: Normal  Neck ROM: Normal ROM    Dental:  Intact    Chest/Lungs:  Clear to auscultation, Normal Respiratory Rate    Heart:  Rate: Tachycardia  Rhythm: Regular Rhythm  Sounds: Normal        Anesthesia Plan  Type of Anesthesia, risks & benefits discussed:    Anesthesia Type: Gen Natural Airway  Intra-op Monitoring Plan: Standard ASA Monitors  Informed Consent: Informed consent signed with the Patient and all parties understand the risks and agree with anesthesia plan.  All questions answered.   ASA Score: 4  Anesthesia Plan Notes:       Ready For Surgery From Anesthesia Perspective.     .

## 2024-03-28 NOTE — NURSING
Nurses Note -- 4 Eyes      3/28/2024   2:15 AM      Skin assessed during: Admit      [x] No Altered Skin Integrity Present    []Prevention Measures Documented      [] Yes- Altered Skin Integrity Present or Discovered   [] LDA Added if Not in Epic (Describe Wound)   [] New Altered Skin Integrity was Present on Admit and Documented in LDA   [] Wound Image Taken    Wound Care Consulted? No    Attending Nurse:  Rene thakur rn    Second RN/Staff Member:   misti

## 2024-03-28 NOTE — ANESTHESIA POSTPROCEDURE EVALUATION
Anesthesia Post Evaluation    Patient: Irvin Diaz JrDavid    Procedure(s) Performed: Procedure(s) (LRB):  EGD (ESOPHAGOGASTRODUODENOSCOPY) (N/A)    Final Anesthesia Type: general      Patient location during evaluation: GI PACU  Patient participation: Yes- Able to Participate  Level of consciousness: awake and alert and oriented  Post-procedure vital signs: reviewed and stable  Pain management: adequate  Airway patency: patent    PONV status at discharge: No PONV  Anesthetic complications: no      Cardiovascular status: blood pressure returned to baseline, hemodynamically stable and stable  Respiratory status: unassisted, spontaneous ventilation and room air  Hydration status: euvolemic  Follow-up not needed.              Vitals Value Taken Time   /75 03/28/24 1211   Temp 36.3 °C (97.3 °F) 03/28/24 1148   Pulse 64 03/28/24 1212   Resp 15 03/28/24 1212   SpO2 97 % 03/28/24 1212   Vitals shown include unvalidated device data.      Event Time   Out of Recovery 03/28/2024 12:17:28         Pain/Leno Score: Pain Rating Prior to Med Admin: 8 (3/28/2024  6:00 AM)  Pain Rating Post Med Admin: 2 (3/28/2024  6:30 AM)  Leno Score: 10 (3/28/2024 11:49 AM)

## 2024-03-28 NOTE — PROGRESS NOTES
[facility]  Initial Discharge Assessment                Primary Care Provider: Edouard Gibson MD    Admission Diagnosis: Gastrointestinal hemorrhage, unspecified gastrointestinal hemorrhage type [K92.2]    Admission Date: 3/27/2024  Expected Discharge Date: 3/31/2024    Transition of Care Barriers: (P) Substance Abuse    Payor: MEDICARE / Plan: MEDICARE PART A & B / Product Type: Government /     Extended Emergency Contact Information  Primary Emergency Contact: Aj Diaz  Mobile Phone: [phone]  Relation: Spouse  Secondary Emergency Contact: JoeIrvin   [place]  Mobile Phone: [phone]  Relation: Father  Preferred language: English   needed? No  Mother: Sharon Diaz  Address: [address] [place] [address]  Mobile Phone: [phone]    Discharge Plan A: (P) Home Health  Discharge Plan B: (P) Home with family      [facility] [address] [address]  Phone: [phone] Fax: [phone]    [facility]  [address]  [facility] [address]  Phone: [phone] Fax: [phone]      Initial Assessment (most recent)       Adult Discharge Assessment - 03/28/24 1444          Discharge Assessment    Assessment Type Discharge Planning Assessment (P)      Confirmed/corrected address, phone number and insurance Yes (P)      Confirmed Demographics Correct on Facesheet (P)      Source of Information patient (P)      People in Home child(yolanda), adult;spouse (P)      Do you expect to return to your current living situation? Yes (P)      Do you have help at home or someone to help you manage your care at home? Yes (P)      Prior to hospitilization cognitive status: Alert/Oriented (P)      Current cognitive status: Alert/Oriented (P)      Walking or Climbing Stairs Difficulty yes (P)      Walking or Climbing Stairs ambulation difficulty, requires  equipment;stair climbing difficulty, requires equipment (P)      Mobility Management patient uses a cane for balance (P)      Dressing/Bathing Difficulty no (P)      Home Layout Able to live on 1st floor (P)      Equipment Currently Used at Home cane, straight (P)      Readmission within 30 days? No (P)      Patient currently being followed by outpatient case management? No (P)      Do you currently have service(s) that help you manage your care at home? No (P)      Do you take prescription medications? Yes (P)      Do you have prescription coverage? Yes (P)      Do you have any problems affording any of your prescribed medications? No (P)      Is the patient taking medications as prescribed? no (P)      If no, which medications is patient not taking? patient states he is not compliant with medications all the time. (P)      How do you get to doctors appointments? car, drives self;family or friend will provide (P)      Are you on dialysis? No (P)      Do you take coumadin? No (P)      Discharge Plan A Home Health (P)      Discharge Plan B Home with family (P)      DME Needed Upon Discharge  none (P)      Discharge Plan discussed with: Patient (P)      Transition of Care Barriers Substance Abuse (P)         Physical Activity    On average, how many days per week do you engage in moderate to strenuous exercise (like a brisk walk)? 0 days (P)      On average, how many minutes do you engage in exercise at this level? 0 min (P)         Financial Resource Strain    How hard is it for you to pay for the very basics like food, housing, medical care, and heating? Patient declined (P)         Housing Stability    In the last 12 months, was there a time when you were not able to pay the mortgage or rent on time? Patient declined (P)      In the last 12 months, was there a time when you did not have a steady place to sleep or slept in a shelter (including now)? No (P)         Transportation Needs    In the past 12 months, has  lack of transportation kept you from medical appointments or from getting medications? No (P)      In the past 12 months, has lack of transportation kept you from meetings, work, or from getting things needed for daily living? No (P)         Food Insecurity    Within the past 12 months, you worried that your food would run out before you got the money to buy more. Patient declined (P)      Within the past 12 months, the food you bought just didn't last and you didn't have money to get more. Patient declined (P)         Stress    Do you feel stress - tense, restless, nervous, or anxious, or unable to sleep at night because your mind is troubled all the time - these days? Patient declined (P)         Social Connections    In a typical week, how many times do you talk on the phone with family, friends, or neighbors? Patient declined (P)      How often do you get together with friends or relatives? Patient declined (P)      How often do you attend Yazdanism or Moravian services? Patient declined (P)      Do you belong to any clubs or organizations such as Yazdanism groups, unions, fraternal or athletic groups, or school groups? Patient declined (P)      How often do you attend meetings of the clubs or organizations you belong to? Patient declined (P)      Are you , , , , never , or living with a partner? Patient declined (P)         Alcohol Use    Q1: How often do you have a drink containing alcohol? Patient declined (P)      Q2: How many drinks containing alcohol do you have on a typical day when you are drinking? Patient declined (P)      Q3: How often do you have six or more drinks on one occasion? Patient declined (P)                  Met with patient at son at bedside for initial assessment. Demographics on face sheet confirmed as correct. Patient lives at home with wife and 3 children. He uses a cane for ambulation but otherwise does well with ADLs. He does report poor medication  management and requests home health on discharge. He has used [facility] before and would like to use that company again.

## 2024-03-28 NOTE — ASSESSMENT & PLAN NOTE
Creatine stable for now. BMP reviewed- noted Estimated Creatinine Clearance: 66.3 mL/min (A) (based on SCr of 1.7 mg/dL (H)). according to latest data. Based on current GFR, CKD stage is stage 3 - GFR 30-59.  Monitor UOP and serial BMP and adjust therapy as needed. Renally dose meds. Avoid nephrotoxic medications and procedures.

## 2024-03-28 NOTE — [employer] PATIENT INSTRUCTIONS
Discharge Summary/Instructions after an Endoscopic Procedure  Patient Name: Irvin Macias  Patient MRN: [id number]  Patient YOB: 1975  Thursday, March 28, 2024  Jeanmarie Ngo MD  RESTRICTIONS:  During your procedure today, you received medications for sedation.  These   medications may affect your judgment, balance and coordination.  Therefore,   for 24 hours, you have the following restrictions:   - DO NOT drive a car, operate machinery, make legal/financial decisions,   sign important papers or drink alcohol.    ACTIVITY:  Today: no heavy lifting, straining or running due to procedural   sedation/anesthesia.  The following day: return to full activity including work.  DIET:  Eat and drink normally unless instructed otherwise.     TREATMENT FOR COMMON SIDE EFFECTS:  - Mild abdominal pain, nausea, belching, bloating or excessive gas:  rest,   eat lightly and use a heating pad.  - Sore Throat: treat with throat lozenges and/or gargle with warm salt   water.  - Because air was used during the procedure, expelling large amounts of air   from your rectum or belching is normal.  - If a bowel prep was taken, you may not have a bowel movement for 1-3 days.    This is normal.  SYMPTOMS TO WATCH FOR AND REPORT TO YOUR PHYSICIAN:  1. Abdominal pain or bloating, other than gas cramps.  2. Chest pain.  3. Back pain.  4. Signs of infection such as: chills or fever occurring within 24 hours   after the procedure.  5. Rectal bleeding, which would show as bright red, maroon, or black stools.   (A tablespoon of blood from the rectum is not serious, especially if   hemorrhoids are present.)  6. Vomiting.  7. Weakness or dizziness.  GO DIRECTLY TO THE NEAREST EMERGENCY ROOM IF YOU HAVE ANY OF THE FOLLOWING:      Difficulty breathing              Chills and/or fever over 101 F   Persistent vomiting and/or vomiting blood   Severe abdominal pain   Severe chest pain   Black, tarry stools   Bleeding- more than one  tablespoon   Any other symptom or condition that you feel may need urgent attention  Your doctor recommends these additional instructions:  If any biopsies were taken, your doctors clinic will contact you in 1 to 2   weeks with any results.  - Return patient to [facility] for ongoing care.  For questions, problems or results please call your physician - Jeanmarie Ngo MD at Work:  [phone].  [facility], EMERGENCY ROOM PHONE NUMBER: [phone]  IF A COMPLICATION OR EMERGENCY SITUATION ARISES AND YOU ARE UNABLE TO REACH   YOUR PHYSICIAN - GO DIRECTLY TO THE EMERGENCY ROOM.  MD Jeanmarie Tripp MD  3/28/2024 11:51:02 AM  This report has been verified and signed electronically.  Dear patient,  As a result of recent federal legislation (The Federal Cures Act), you may   receive lab or pathology results from your procedure in your [facility]   account before your physician is able to contact you. Your physician or   their representative will relay the results to you with their   recommendations at their soonest availability.  Thank you,  [employer]ATION

## 2024-03-28 NOTE — TRANSFER OF CARE
Anesthesia Transfer of Care Note    Patient: Irvin Diaz     Procedure(s) Performed: Procedure(s) (LRB):  EGD (ESOPHAGOGASTRODUODENOSCOPY) (N/A)    Patient location: GI    Anesthesia Type: general    Transport from OR: Transported from OR on room air with adequate spontaneous ventilation    Post pain: adequate analgesia    Post assessment: no apparent anesthetic complications    Post vital signs: stable    Level of consciousness: awake and alert    Nausea/Vomiting: no nausea/vomiting    Complications: none    Transfer of care protocol was followedComments: AAXO3 SV, exchanging well.  To PACU, VSS upon arrival. Report to RN       Last vitals: Visit Vitals  BP (!) 176/81 (BP Location: Right arm, Patient Position: Lying)   Pulse 80   Temp 36.3 °C (97.3 °F) (Tympanic)   Resp 18   Ht 6' (1.829 m)   Wt 106.6 kg (235 lb)   SpO2 100%   BMI 31.87 kg/m²

## 2024-03-28 NOTE — ASSESSMENT & PLAN NOTE
Patient with known Cirrhosis with Child's class B. Co-morbidities are present and inclusive of esophageal varices and anemia.  MELD-Na score calculated; MELD 3.0: 19 at 3/28/2024  6:07 AM  MELD-Na: 18 at 3/28/2024  6:07 AM  Calculated from:  Serum Creatinine: 1.7 mg/dL at 3/28/2024  6:07 AM  Serum Sodium: 138 mmol/L (Using max of 137 mmol/L) at 3/28/2024  6:07 AM  Total Bilirubin: 3.2 mg/dL at 3/28/2024  6:07 AM  Serum Albumin: 3.1 g/dL at 3/28/2024  6:07 AM  INR(ratio): 1.2 at 3/28/2024  6:07 AM  Age at listing (hypothetical): 48 years  Sex: Male at 3/28/2024  6:07 AM      Continue chronic meds. Etiology likely ETOH. Will avoid any hepatotoxic meds, and monitor CBC/CMP/INR for synthetic function.

## 2024-03-29 ENCOUNTER — CLINICAL SUPPORT (OUTPATIENT)
Dept: CARDIOLOGY | Facility: HOSPITAL | Age: 49
DRG: 151 | End: 2024-03-29
Attending: INTERNAL MEDICINE
Payer: MEDICARE

## 2024-03-29 VITALS — SYSTOLIC BLOOD PRESSURE: 146 MMHG | DIASTOLIC BLOOD PRESSURE: 85 MMHG

## 2024-03-29 PROBLEM — I47.20 VENTRICULAR TACHYCARDIA: Status: ACTIVE | Noted: 2024-03-29

## 2024-03-29 LAB
ALBUMIN SERPL BCP-MCNC: 3.1 G/DL (ref 3.5–5.2)
ALP SERPL-CCNC: 75 U/L (ref 55–135)
ALT SERPL W/O P-5'-P-CCNC: 18 U/L (ref 10–44)
ANION GAP SERPL CALC-SCNC: 4 MMOL/L (ref 8–16)
AORTIC ROOT ANNULUS: 2.9 CM
AORTIC VALVE CUSP SEPERATION: 2.2 CM
AST SERPL-CCNC: 37 U/L (ref 10–40)
AV INDEX (PROSTH): 0.93
AV MEAN GRADIENT: 4 MMHG
AV PEAK GRADIENT: 8 MMHG
AV VALVE AREA BY VELOCITY RATIO: 2.94 CM²
AV VALVE AREA: 2.91 CM²
AV VELOCITY RATIO: 0.94
BASOPHILS # BLD AUTO: 0.02 K/UL (ref 0–0.2)
BASOPHILS NFR BLD: 0.4 % (ref 0–1.9)
BILIRUB SERPL-MCNC: 2.7 MG/DL (ref 0.1–1)
BSA FOR ECHO PROCEDURE: 2.33 M2
BUN SERPL-MCNC: 20 MG/DL (ref 6–20)
CALCIUM SERPL-MCNC: 8.2 MG/DL (ref 8.7–10.5)
CHLORIDE SERPL-SCNC: 106 MMOL/L (ref 95–110)
CO2 SERPL-SCNC: 27 MMOL/L (ref 23–29)
CREAT SERPL-MCNC: 1.9 MG/DL (ref 0.5–1.4)
CV ECHO LV RWT: 0.57 CM
DIFFERENTIAL METHOD BLD: ABNORMAL
DOP CALC AO PEAK VEL: 1.39 M/S
DOP CALC AO VTI: 34.2 CM
DOP CALC LVOT AREA: 3.1 CM2
DOP CALC LVOT DIAMETER: 2 CM
DOP CALC LVOT PEAK VEL: 1.3 M/S
DOP CALC LVOT STROKE VOLUME: 99.54 CM3
DOP CALC MV VTI: 40 CM
DOP CALCLVOT PEAK VEL VTI: 31.7 CM
E WAVE DECELERATION TIME: 204 MSEC
E/A RATIO: 1.93
E/E' RATIO: 11.2 M/S
ECHO LV POSTERIOR WALL: 1.43 CM (ref 0.6–1.1)
EOSINOPHIL # BLD AUTO: 0.1 K/UL (ref 0–0.5)
EOSINOPHIL NFR BLD: 2.5 % (ref 0–8)
ERYTHROCYTE [DISTWIDTH] IN BLOOD BY AUTOMATED COUNT: 17.3 % (ref 11.5–14.5)
EST. GFR  (NO RACE VARIABLE): 42.7 ML/MIN/1.73 M^2
FRACTIONAL SHORTENING: 32 % (ref 28–44)
GLUCOSE SERPL-MCNC: 111 MG/DL (ref 70–110)
HCT VFR BLD AUTO: 27.7 % (ref 40–54)
HGB BLD-MCNC: 9.1 G/DL (ref 14–18)
IMM GRANULOCYTES # BLD AUTO: 0.02 K/UL (ref 0–0.04)
IMM GRANULOCYTES NFR BLD AUTO: 0.4 % (ref 0–0.5)
INTERVENTRICULAR SEPTUM: 1.31 CM (ref 0.6–1.1)
IVC DIAMETER: 1.43 CM
LEFT ATRIUM SIZE: 4.5 CM
LEFT INTERNAL DIMENSION IN SYSTOLE: 3.39 CM (ref 2.1–4)
LEFT VENTRICLE DIASTOLIC VOLUME INDEX: 51.75 ML/M2
LEFT VENTRICLE DIASTOLIC VOLUME: 118 ML
LEFT VENTRICLE MASS INDEX: 123 G/M2
LEFT VENTRICLE SYSTOLIC VOLUME INDEX: 20.7 ML/M2
LEFT VENTRICLE SYSTOLIC VOLUME: 47.1 ML
LEFT VENTRICULAR INTERNAL DIMENSION IN DIASTOLE: 4.99 CM (ref 3.5–6)
LEFT VENTRICULAR MASS: 281.52 G
LV LATERAL E/E' RATIO: 9.33 M/S
LV SEPTAL E/E' RATIO: 14 M/S
LVOT MG: 3 MMHG
LVOT MV: 0.83 CM/S
LYMPHOCYTES # BLD AUTO: 1.4 K/UL (ref 1–4.8)
LYMPHOCYTES NFR BLD: 25.8 % (ref 18–48)
MAGNESIUM SERPL-MCNC: 1.9 MG/DL (ref 1.6–2.6)
MCH RBC QN AUTO: 34.9 PG (ref 27–31)
MCHC RBC AUTO-ENTMCNC: 32.9 G/DL (ref 32–36)
MCV RBC AUTO: 106 FL (ref 82–98)
MONOCYTES # BLD AUTO: 0.5 K/UL (ref 0.3–1)
MONOCYTES NFR BLD: 8.7 % (ref 4–15)
MR PISA EROA: 0.29 CM2
MV MEAN GRADIENT: 2 MMHG
MV PEAK A VEL: 0.58 M/S
MV PEAK E VEL: 1.12 M/S
MV PEAK GRADIENT: 6 MMHG
MV STENOSIS PRESSURE HALF TIME: 60 MS
MV VALVE AREA BY CONTINUITY EQUATION: 2.49 CM2
MV VALVE AREA P 1/2 METHOD: 3.67 CM2
NEUTROPHILS # BLD AUTO: 3.4 K/UL (ref 1.8–7.7)
NEUTROPHILS NFR BLD: 62.2 % (ref 38–73)
NRBC BLD-RTO: 0 /100 WBC
OHS LV EJECTION FRACTION SIMPSONS BIPLANE MOD: 57 %
PISA MRMAX VEL: 3.63 M/S
PISA RADIUS: 0.7 CM
PISA TR MAX VEL: 1.97 M/S
PISA VN NYQUIST MS: 0.34 M/S
PISA VN NYQUIST: 0.34 M/S
PLATELET # BLD AUTO: 59 K/UL (ref 150–450)
PMV BLD AUTO: 10.7 FL (ref 9.2–12.9)
POTASSIUM SERPL-SCNC: 4 MMOL/L (ref 3.5–5.1)
PROT SERPL-MCNC: 6.8 G/DL (ref 6–8.4)
PV MV: 0.73 M/S
PV PEAK GRADIENT: 5 MMHG
PV PEAK VELOCITY: 1.07 M/S
RA PRESSURE ESTIMATED: 3 MMHG
RBC # BLD AUTO: 2.61 M/UL (ref 4.6–6.2)
RV TB RVSP: 5 MMHG
RV TISSUE DOPPLER FREE WALL SYSTOLIC VELOCITY 1 (APICAL 4 CHAMBER VIEW): 14.7 CM/S
SODIUM SERPL-SCNC: 137 MMOL/L (ref 136–145)
TDI LATERAL: 0.12 M/S
TDI SEPTAL: 0.08 M/S
TDI: 0.1 M/S
TR MAX PG: 16 MMHG
TRICUSPID ANNULAR PLANE SYSTOLIC EXCURSION: 2.14 CM
TV REST PULMONARY ARTERY PRESSURE: 19 MMHG
WBC # BLD AUTO: 5.5 K/UL (ref 3.9–12.7)
Z-SCORE OF LEFT VENTRICULAR DIMENSION IN END DIASTOLE: -5.43
Z-SCORE OF LEFT VENTRICULAR DIMENSION IN END SYSTOLE: -3.37

## 2024-03-29 PROCEDURE — 36415 COLL VENOUS BLD VENIPUNCTURE: CPT

## 2024-03-29 PROCEDURE — 25000003 PHARM REV CODE 250

## 2024-03-29 PROCEDURE — 25000003 PHARM REV CODE 250: Performed by: INTERNAL MEDICINE

## 2024-03-29 PROCEDURE — 99223 1ST HOSP IP/OBS HIGH 75: CPT | Mod: ,,, | Performed by: INTERNAL MEDICINE

## 2024-03-29 PROCEDURE — C9113 INJ PANTOPRAZOLE SODIUM, VIA: HCPCS

## 2024-03-29 PROCEDURE — 63600175 PHARM REV CODE 636 W HCPCS

## 2024-03-29 PROCEDURE — 93306 TTE W/DOPPLER COMPLETE: CPT | Mod: 26,,, | Performed by: INTERNAL MEDICINE

## 2024-03-29 PROCEDURE — 93306 TTE W/DOPPLER COMPLETE: CPT

## 2024-03-29 PROCEDURE — 80053 COMPREHEN METABOLIC PANEL: CPT

## 2024-03-29 PROCEDURE — 83735 ASSAY OF MAGNESIUM: CPT

## 2024-03-29 PROCEDURE — 85025 COMPLETE CBC W/AUTO DIFF WBC: CPT

## 2024-03-29 PROCEDURE — 12000002 HC ACUTE/MED SURGE SEMI-PRIVATE ROOM

## 2024-03-29 PROCEDURE — 63600175 PHARM REV CODE 636 W HCPCS: Performed by: INTERNAL MEDICINE

## 2024-03-29 RX ORDER — HYDRALAZINE HYDROCHLORIDE 20 MG/ML
10 INJECTION INTRAMUSCULAR; INTRAVENOUS ONCE
Status: COMPLETED | OUTPATIENT
Start: 2024-03-29 | End: 2024-03-29

## 2024-03-29 RX ADMIN — HYDROMORPHONE HYDROCHLORIDE 1 MG: 1 INJECTION, SOLUTION INTRAMUSCULAR; INTRAVENOUS; SUBCUTANEOUS at 10:03

## 2024-03-29 RX ADMIN — METOPROLOL TARTRATE 50 MG: 50 TABLET, FILM COATED ORAL at 08:03

## 2024-03-29 RX ADMIN — OCTREOTIDE ACETATE 50 MCG/HR: 500 INJECTION, SOLUTION INTRAVENOUS; SUBCUTANEOUS at 08:03

## 2024-03-29 RX ADMIN — HYDROMORPHONE HYDROCHLORIDE 1 MG: 1 INJECTION, SOLUTION INTRAMUSCULAR; INTRAVENOUS; SUBCUTANEOUS at 04:03

## 2024-03-29 RX ADMIN — PANTOPRAZOLE SODIUM 8 MG/HR: 40 INJECTION, POWDER, FOR SOLUTION INTRAVENOUS at 01:03

## 2024-03-29 RX ADMIN — OCTREOTIDE ACETATE 50 MCG/HR: 500 INJECTION, SOLUTION INTRAVENOUS; SUBCUTANEOUS at 04:03

## 2024-03-29 RX ADMIN — HYDROMORPHONE HYDROCHLORIDE 1 MG: 1 INJECTION, SOLUTION INTRAMUSCULAR; INTRAVENOUS; SUBCUTANEOUS at 06:03

## 2024-03-29 RX ADMIN — PANTOPRAZOLE SODIUM 8 MG/HR: 40 INJECTION, POWDER, FOR SOLUTION INTRAVENOUS at 08:03

## 2024-03-29 RX ADMIN — HYDRALAZINE HYDROCHLORIDE 10 MG: 20 INJECTION, SOLUTION INTRAMUSCULAR; INTRAVENOUS at 10:03

## 2024-03-29 RX ADMIN — PANTOPRAZOLE SODIUM 8 MG/HR: 40 INJECTION, POWDER, FOR SOLUTION INTRAVENOUS at 04:03

## 2024-03-29 NOTE — ASSESSMENT & PLAN NOTE
EKG done, revealed no change from previous EKG's, with prolonged QT  Echo and cardiology consult ordered

## 2024-03-29 NOTE — CONSULTS
[facility]  Department of Cardiology  Consult Note      PATIENT NAME: Irvin Diaz Jr.    MRN: [id number]  TODAY'S DATE: 03/29/2024  ADMIT DATE: 3/27/2024                          CONSULT REQUESTED BY: Sharon Jarvis MD    SUBJECTIVE     PRINCIPAL PROBLEM: Gastrointestinal hemorrhage  48-year-old male presented to ED for eval of bleeding. Patient reported he has been having intermittent epistaxis since yesterday. Patient also reported today he had hematemesis and tarry stools. Patient has hx of alcoholic cirrhosis. Patient reported he had not had a drink for about 2 months prior to 2 days ago when he did consume alcohol. Patient has had episodes similar to this in the past and has required transfusions. Denies taking blood thinners or NSAIDs. Denied abd pain. Denied fever. Hgb & hct stable. Noted with thrombocytopenia, appears to be around baseline. Noted to have bright red bloody emesis to emesis bag on exam, epistaxis has ceased. Heme occult positive in ED. GI consulted in ED. Admit to hospital medicine for further eval.      Overview/Hospital Course:  The patient was admitted and observed for any bleeding, persistent vomiting or change in blood pressure.  He was continued on octreotide and ppi. GI was consulted and planned an EGD. The patient has chronic thrombocytopenia, and this was monitored closely, with the plan to transfuse platelets for count less than 10,000 and stop dvt prophylaxis for count less then 50,000.     REASON FOR CONSULT:  Ventricular tachycardia      HPI:  Patient is a 48-year-old male presented to the emergency room with bleeding issues he has been having heavy epistaxis for almost most of the day and he also complained of having hematemesis and dark tarry stools.  Patient has prior history of alcoholic cirrhosis and has had similar episodes in the past.  At the present time patient is comfortable denies any chest pain or tightness or heaviness denies any dizziness or  lightheadedness or loss of consciousness.    Patient had a 15 beat of ventricular tachycardia on 3 /29 EKG was ordered and there was no change from the previous EKGs he did have prolonged QT interval.  Patient is currently on octreotide drip.        Review of patient's allergies indicates:   Allergen Reactions    Ciprofloxacin hcl Hallucinations    Meperidine Hives     Pt medicated w/multiple medications (demerol, protonix, and zofran)  w/i 10 mins time frame prior to developing localized hives near IV site that med was administered. Pt reports he has/takes all other medications on daily basis.     Morphine Itching    Nsaids (non-steroidal anti-inflammatory drug)      Kidney Disease    Tylenol [acetaminophen]      Hx of liver disease       Past Medical History:   Diagnosis Date    Alcohol withdrawal 5/1/2022    Alcoholic cirrhosis of liver     Alcoholic ketoacidosis 6/6/2021    Arthritis     ATN (acute tubular necrosis)     CHF (congestive heart failure)     Diverticulitis 01/2020    Esophageal varices     GERD (gastroesophageal reflux disease)     GI bleed     Hip arthritis     Left    Hypertension     Liver cirrhosis     Macrocytic anemia     Pulmonary embolism 08/2018    Unprovoked DVT.  Stop Coumadin due to GIB    Thrombocytopenia      Past Surgical History:   Procedure Laterality Date    ANKLE SURGERY      BLOCK, NERVE, PERIPHERAL Left 06/24/2022    Procedure: Left Hip femoral-obturator accessory nerve block;  Surgeon: Robbin De La Garza DO;  Location: [facility] OR;  Service: Pain Management;  Laterality: Left;    COLON SURGERY  2007    COLONOSCOPY  09/05/2019    [facility]    COLONOSCOPY N/A 02/17/2021    Procedure: COLONOSCOPY;  Surgeon: Renea Billingsley MD;  Location: [facility];  Service: Endoscopy;  Laterality: N/A;    COLONOSCOPY N/A 2/13/2023    Procedure: COLONOSCOPY;  Surgeon: Rudy Orellana MD;  Location: [facility] ([address] [place] [address]);  Service: Endoscopy;  Laterality: N/A;  cirrhosis-labs done on 1/11/23  instr  portal-GT  No answer for precall- [place]    COLONOSCOPY N/A 3/10/2023    Procedure: COLONOSCOPY;  Surgeon: Rudy Orellana MD;  Location: [facility] ([address] [place] [address]);  Service: Endoscopy;  Laterality: N/A;  inst via poral per pt request    COLONOSCOPY W/ BIOPSIES  02/17/2021    ESOPHAGOGASTRODUODENOSCOPY N/A 07/26/2019    Procedure: EGD (ESOPHAGOGASTRODUODENOSCOPY);  Surgeon: Brian Trivedi MD;  [facility];  Service: Endoscopy;  Laterality: N/A;    ESOPHAGOGASTRODUODENOSCOPY N/A 04/08/2020    Procedure: EGD (ESOPHAGOGASTRODUODENOSCOPY);  Surgeon: Donn Acuna MD;  Location: [facility];  Service: Endoscopy;  Laterality: N/A;    ESOPHAGOGASTRODUODENOSCOPY N/A 01/03/2021    Procedure: EGD (ESOPHAGOGASTRODUODENOSCOPY)- coffee ground emesis, hx varices;  Surgeon: Steve Chairez MD;  Location: [facility];  Service: Endoscopy;  Laterality: N/A;    ESOPHAGOGASTRODUODENOSCOPY N/A 05/03/2021    Procedure: EGD (ESOPHAGOGASTRODUODENOSCOPY);  Surgeon: Jeanmarie Ngo MD;  Location: [facility];  Service: Endoscopy;  Laterality: N/A;    ESOPHAGOGASTRODUODENOSCOPY N/A 07/19/2021    Procedure: ESOPHAGOGASTRODUODENOSCOPY (EGD);  Surgeon: Claudio Medeiros MD;  Location: [facility];  Service: Endoscopy;  Laterality: N/A;    ESOPHAGOGASTRODUODENOSCOPY  12/20/2021    ESOPHAGOGASTRODUODENOSCOPY N/A 12/20/2021    Procedure: EGD (ESOPHAGOGASTRODUODENOSCOPY);  Surgeon: Ajay Jackson MD;  Location: [facility];  Service: Endoscopy;  Laterality: N/A;    ESOPHAGOGASTRODUODENOSCOPY N/A 05/03/2022    Procedure: EGD (ESOPHAGOGASTRODUODENOSCOPY);  Surgeon: Brian Trivedi MD;  Location: [facility];  Service: Endoscopy;  Laterality: N/A;    ESOPHAGOGASTRODUODENOSCOPY N/A 7/7/2022    Procedure: EGD (ESOPHAGOGASTRODUODENOSCOPY);  Surgeon: Ajay Jackson MD;  Location: [facility];  Service: Endoscopy;  Laterality: N/A;    ESOPHAGOGASTRODUODENOSCOPY N/A 11/29/2022    Procedure: EGD (ESOPHAGOGASTRODUODENOSCOPY);  Surgeon: Edouard Maynard MD;   Location: [facility] ENDO (2ND FLR);  Service: Endoscopy;  Laterality: N/A;    ESOPHAGOGASTRODUODENOSCOPY N/A 2023    Procedure: EGD (ESOPHAGOGASTRODUODENOSCOPY);  Surgeon: Caesar Dickens MD;  Location: [facility] ENDO;  Service: Endoscopy;  Laterality: N/A;    ESOPHAGOGASTRODUODENOSCOPY N/A 3/28/2024    Procedure: EGD (ESOPHAGOGASTRODUODENOSCOPY);  Surgeon: Jeanmarie Ngo MD;  Location: [facility] ENDO;  Service: Endoscopy;  Laterality: N/A;    HERNIA REPAIR Left     Inguinal    INJECTION OF JOINT Right 2023    Procedure: RT Hip Injection;  Surgeon: Robbin De La Garza DO;  Location: [facility]IN [facility];  Service: Pain Management;  Laterality: Right;  oral - 20 mins    UPPER GASTROINTESTINAL ENDOSCOPY N/A 2022     Social History     Tobacco Use    Smoking status: Former     Current packs/day: 0.00     Types: Cigarettes     Quit date:      Years since quittin.2    Smokeless tobacco: Never    Tobacco comments:     quit 20 years ago   Substance Use Topics    Alcohol use: Not Currently     Comment: Quit drinking 21    Drug use: Not Currently        REVIEW OF SYSTEMS  CONSTITUTIONAL: Negative for chills, fatigue and fever.   EYES: No double vision, No blurred vision  NEURO: No headaches, No dizziness  RESPIRATORY: Negative for cough, shortness of breath and wheezing.    CARDIOVASCULAR: Negative for chest pain. Negative for palpitations and leg swelling.   GI:  History of bleeding including epistaxis hematemesis and dark tarry stools.  History of alcoholic cirrhosis    : Negative for dysuria and frequency, Negative for hematuria  SKIN: Negative for bruising, Negative for edema or discoloration noted.   ENDOCRINE: Negative for polyphagia, Negative for heat intolerance, Negative for cold intolerance  PSYCHIATRIC: Negative for depression, Negative for anxiety, Negative for memory loss  MUSCULOSKELETAL: Negative for neck pain, Negative for muscle weakness, Negative for back pain     OBJECTIVE      VITAL SIGNS (Most Recent)  Temp: 98.1 °F (36.7 °C) (03/29/24 0710)  Pulse: 67 (03/29/24 0710)  Resp: 19 (03/29/24 0710)  BP: (!) 146/85 (03/29/24 0800)  SpO2: 98 % (03/29/24 0710)    VENTILATION STATUS  Resp: 19 (03/29/24 0710)  SpO2: 98 % (03/29/24 0710)           I & O (Last 24H):  Intake/Output Summary (Last 24 hours) at 3/29/2024 0920  Last data filed at 3/29/2024 0427  Gross per 24 hour   Intake 780 ml   Output 925 ml   Net -145 ml       WEIGHTS  Wt Readings from Last 1 Encounters:   03/28/24 0151 106.6 kg (235 lb)   03/27/24 0818 106.6 kg (235 lb)       PHYSICAL EXAM  GENERAL: well built, well nourished, well-developed in no apparent distress alert and oriented.   HEENT: Normocephalic. Pupils normal and conjunctivae normal.  ..   NECK: No JVD. No bruit..   THYROID: Thyroid not evaluated..   CARDIAC: Regular rate and rhythm. S1 is normal.S2 is normal.No gallops, clicks or murmurs noted at this time.  CHEST ANATOMY: normal.   LUNGS: Clear to auscultation.  Decreased breath sounds at the bases.   ABDOMEN: Soft bowel sounds audible and appears to be full  URINARY: No boyce catheter   EXTREMITIES: No cyanosis, clubbing or edema noted at this time., no calf tenderness bilaterally.   PERIPHERAL VASCULAR SYSTEM: Good palpable distal pulses.   CENTRAL NERVOUS SYSTEM: No focal motor or sensory deficits noted.   SKIN: Skin without lesions, moist, well perfused.   MUSCLE STRENGTH & TONE: No noteable weakness, atrophy or abnormal movement.     HOME MEDICATIONS:  No current facility-administered medications on file prior to encounter.     Current Outpatient Medications on File Prior to Encounter   Medication Sig Dispense Refill    traMADoL (ULTRAM) 50 mg tablet Take 1 tablet (50 mg total) by mouth 4 (four) times daily as needed for Pain. 120 each 2    acamprosate (CAMPRAL) 333 mg tablet Take 2 tablets (666 mg total) by mouth 3 (three) times daily. 180 tablet 11    amLODIPine (NORVASC) 10 MG tablet Take 1 tablet (10 mg  total) by mouth once daily. 30 tablet 11    augmented betamethasone dipropionate (DIPROLENE-AF) 0.05 % cream Apply topically 2 (two) times daily. (Patient taking differently: Apply 1 application  topically 2 (two) times daily.) 50 g 1    calcitRIOL (ROCALTROL) 0.25 MCG Cap Take 1 capsule (0.25 mcg total) by mouth once daily. 30 capsule 1    carvediloL (COREG) 12.5 MG tablet Take 1 tablet (12.5 mg total) by mouth 2 (two) times daily with meals. 60 tablet 11    clindamycin-benzoyl peroxide (BENZACLIN) gel Apply topically 2 (two) times daily. (Patient taking differently: Apply 1 application  topically 2 (two) times daily.) 50 g 1    ferrous gluconate (FERGON) 240 (27 FE) MG tablet Take 2 tablets (480 mg total) by mouth 2 (two) times daily with meals. 120 tablet 3    folic acid (FOLVITE) 1 MG tablet Take 1 tablet (1 mg total) by mouth once daily. 30 tablet 5    furosemide (LASIX) 20 MG tablet Take 1 tablet (20 mg total) by mouth once daily. 30 tablet 11    hydrOXYzine HCL (ATARAX) 25 MG tablet Take 25 mg by mouth nightly as needed for Itching.      pantoprazole (PROTONIX) 40 MG tablet Take 2 tablets (80 mg total) by mouth 2 (two) times daily. 60 tablet 11    rifAXIMin (XIFAXAN) 550 mg Tab Take 1 tablet (550 mg total) by mouth 2 (two) times daily. 60 tablet 11    sildenafiL (VIAGRA) 100 MG tablet Take 1 tablet (100 mg total) by mouth daily as needed for Erectile Dysfunction. 30 tablet 5    spironolactone (ALDACTONE) 50 MG tablet Take 1 tablet (50 mg total) by mouth once daily. 30 tablet 11    thiamine 100 MG tablet Take 1 tablet (100 mg total) by mouth once daily. 30 tablet 5       SCHEDULED MEDS:   metoprolol tartrate  50 mg Oral BID    silver nitrate applicators  2 applicator Topical (Top) Once       CONTINUOUS INFUSIONS:   octreotide (SANDOSTATIN) 500 mcg in sodium chloride 0.9% 100 mL infusion 50 mcg/hr (03/29/24 5981)    pantoprazole (PROTONIX) IV infusion 8 mg/hr (03/29/24 1437)       PRN MEDS:aluminum-magnesium  "hydroxide-simethicone, HYDROmorphone, labetalol, lorazepam, magnesium oxide, magnesium oxide, melatonin, naloxone, ondansetron, potassium bicarbonate, potassium bicarbonate, potassium bicarbonate, potassium, sodium phosphates, potassium, sodium phosphates, potassium, sodium phosphates, sodium chloride 0.9%    LABS AND DIAGNOSTICS     CBC LAST 3 DAYS  Recent Labs   Lab 03/27/24  0850 03/27/24 1935 03/28/24  0607 03/28/24  1228 03/28/24 2056 03/29/24 0622   WBC 6.16  --  5.49  --  5.34 5.50   RBC 3.42*  --  2.52*  --  2.70* 2.61*   HGB 11.9*   < > 9.1*  9.1* 10.4* 9.7* 9.1*   HCT 36.2*  --  27.0*  --  28.9* 27.7*   *  --  107*  --  107* 106*   MCH 34.8*  --  36.1*  --  35.9* 34.9*   MCHC 32.9  --  33.7  --  33.6 32.9   RDW 17.2*  --  17.4*  --  17.4* 17.3*   PLT 69*  --  50*  --  52* 59*   MPV 10.8  --  10.9  --  10.7 10.7   GRAN 66.1  4.1  --  65.0  3.6  --   --  62.2  3.4   LYMPH 25.2  1.6  --  25.0  1.4  --   --  25.8  1.4   MONO 6.8  0.4  --  7.7  0.4  --   --  8.7  0.5   BASO 0.03  --  0.02  --   --  0.02   NRBC 0  --  0  --   --  0    < > = values in this interval not displayed.       COAGULATION LAST 3 DAYS  Recent Labs   Lab 03/27/24 0850 03/27/24 1935 03/28/24 0607   INR 1.2  --  1.2   APTT  --  29.3  --        CHEMISTRY LAST 3 DAYS  Recent Labs   Lab 03/27/24  0850 03/28/24  0607 03/28/24  2056 03/29/24  0622    138 138 137   K 4.1 3.7 3.9 4.0    105 106 106   CO2 25 26 26 27   ANIONGAP 11 7* 6* 4*   BUN 31* 29* 22* 20   CREATININE 1.6* 1.7* 1.8* 1.9*    122* 108 111*   CALCIUM 8.9 8.3* 8.4* 8.2*   MG  --  1.7 1.8 1.9   ALBUMIN 3.8 3.1*  --  3.1*   PROT 8.6* 6.7  --  6.8   ALKPHOS 102 75  --  75   ALT 26 18  --  18   AST 47* 33  --  37   BILITOT 3.9* 3.2*  --  2.7*       CARDIAC PROFILE LAST 3 DAYS  Recent Labs   Lab 03/28/24 2056   TROPONINIHS 18.0*       ENDOCRINE LAST 3 DAYS  No results for input(s): "TSH", "PROCAL" in the last 168 hours.    LAST ARTERIAL BLOOD " "GAS  ABG  No results for input(s): "PH", "PO2", "PCO2", "HCO3", "BE" in the last 168 hours.    LAST 7 DAYS MICROBIOLOGY   Microbiology Results (last 7 days)       Procedure Component Value Units Date/Time    Urine culture [[id number]]  (Abnormal) Collected: 03/27/24 2150    Order Status: Completed Specimen: Urine Updated: 03/29/24 0652     Urine Culture, Routine STAPHYLOCOCCUS AUREUS  >100,000cfu/ml  Susceptibility pending      Narrative:      Specimen Source->Urine            MOST RECENT IMAGING  SURG FL Surgery Fluoro Usage  See OP Notes for results.     IMPRESSION: See OP Notes for results.     This procedure was auto-finalized by: Virtual Radiologist      ECHOCARDIOGRAM RESULTS (last 5)  Results for orders placed during the hospital encounter of 07/18/22    Echo Saline Bubble? No    Interpretation Summary  · The left ventricle is mildly enlarged with normal systolic function.  · The estimated ejection fraction is 63%.  · Indeterminate left ventricular diastolic function.  · Normal right ventricular size with normal right ventricular systolic function.  · Mild left atrial enlargement.  · Mild right atrial enlargement.  · Trivial posterior pericardial effusion. And outside the RA.      Results for orders placed during the hospital encounter of 03/07/22    Echo    Interpretation Summary  · The left ventricle is normal in size with mild concentric hypertrophy and normal systolic function.  · The estimated ejection fraction is 60%.  · Normal left ventricular diastolic function.  · Normal right ventricular size with normal right ventricular systolic function.  · The estimated PA systolic pressure is 37 mmHg.  · Normal central venous pressure (3 mmHg).      Results for orders placed during the hospital encounter of 10/26/20    Echo Color Flow Doppler? Yes    Interpretation Summary  · The left ventricle is normal in size with normal systolic function. The estimated ejection fraction is 55%.  · There is left ventricular " concentric hypertrophy.  · Normal left ventricular diastolic function.  · Mild tricuspid regurgitation.  · Intermediate central venous pressure (8 mmHg).  · The estimated PA systolic pressure is 34 mmHg.      CURRENT/PREVIOUS VISIT EKG  Results for orders placed or performed during the hospital encounter of 03/27/24   EKG 12-lead    Collection Time: 03/28/24  6:20 PM   Result Value Ref Range    QRS Duration 88 ms    [facility] QTC Calculation 503 ms    Narrative    Test Reason : I49.3,    Vent. Rate : 072 BPM     Atrial Rate : 072 BPM     P-R Int : 146 ms          QRS Dur : 088 ms      QT Int : 460 ms       P-R-T Axes : 064 074 064 degrees     QTc Int : 503 ms    Normal sinus rhythm  Prolonged QT  Abnormal ECG  When compared with ECG of 27-MAR-2024 19:01,  No significant change was found    Referred By: [email or web address]   SELF           Confirmed By:            ASSESSMENT/PLAN:     Active Hospital Problems    Diagnosis    *Gastrointestinal hemorrhage    HTN (hypertension)    Alcohol abuse    CKD (chronic kidney disease) stage 3, GFR 30-59 ml/min    Alcoholic cirrhosis of liver    Thrombocytopenia       ASSESSMENT & PLAN:   1. Ventricular tachycardia   2. Ethanol abuse/alcoholic cirrhosis of the liver  3. Thrombocytopenia   4. Chronic kidney disease  5. Essential hypertension  6. Gastrointestinal hemorrhage.        RECOMMENDATIONS:  1. Patient had 21 beat run of ventricular tachycardia and patient is currently on octreotide drip  Recommend to discontinue octreotide drip.    2. Octreotide has prolonged his QT interval his corrected QT interval is 503 milliseconds.  And patient has a risk of going back into ventricular tachycardia.  3. Started him on metoprolol tartrate 50 mg p.o. b.i.d..  Continue the same.  4. 2D echocardiogram to evaluate for alcoholic cardiomyopathy.  And that would also explain for ventricular tachycardia.    He would also need further cardiac evaluation including stress myocardial perfusion study when he is  more stable.  5. Further recommendations to follow will closely follow with you thank you for the consultation.      Carl Abreu MD  Date of Service: 03/29/2024  9:20 AM

## 2024-03-29 NOTE — ASSESSMENT & PLAN NOTE
Creatine stable for now. BMP reviewed- noted Estimated Creatinine Clearance: 59.3 mL/min (A) (based on SCr of 1.9 mg/dL (H)). according to latest data. Based on current GFR, CKD stage is stage 3 - GFR 30-59.  Monitor UOP and serial BMP and adjust therapy as needed. Renally dose meds. Avoid nephrotoxic medications and procedures.

## 2024-03-29 NOTE — ASSESSMENT & PLAN NOTE
Patient with known Cirrhosis with Child's class B. Co-morbidities are present and inclusive of esophageal varices and anemia.  MELD-Na score calculated; MELD 3.0: 20 at 3/29/2024  6:22 AM  MELD-Na: 18 at 3/29/2024  6:22 AM  Calculated from:  Serum Creatinine: 1.9 mg/dL at 3/29/2024  6:22 AM  Serum Sodium: 137 mmol/L at 3/29/2024  6:22 AM  Total Bilirubin: 2.7 mg/dL at 3/29/2024  6:22 AM  Serum Albumin: 3.1 g/dL at 3/29/2024  6:22 AM  INR(ratio): 1.2 at 3/28/2024  6:07 AM  Age at listing (hypothetical): 48 years  Sex: Male at 3/29/2024  6:22 AM      Continue chronic meds. Etiology likely ETOH. Will avoid any hepatotoxic meds, and monitor CBC/CMP/INR for synthetic function.

## 2024-03-29 NOTE — PROGRESS NOTES
[facility] Medicine  Progress Note    Patient Name: Irvin Diaz Jr.  MRN: [id number]  Patient Class: IP- Inpatient   Admission Date: 3/27/2024  Length of Stay: 2 days  Attending Physician: Sharon Jarvis MD  Primary Care Provider: Edouard Gibson MD        Subjective:     Principal Problem:Gastrointestinal hemorrhage        HPI:  48-year-old male presented to ED for eval of bleeding. Patient reported he has been having intermittent epistaxis since yesterday. Patient also reported today he had hematemesis and tarry stools. Patient has hx of alcoholic cirrhosis. Patient reported he had not had a drink for about 2 months prior to 2 days ago when he did consume alcohol. Patient has had episodes similar to this in the past and has required transfusions. Denies taking blood thinners or NSAIDs. Denied abd pain. Denied fever. Hgb & hct stable. Noted with thrombocytopenia, appears to be around baseline. Noted to have bright red bloody emesis to emesis bag on exam, epistaxis has ceased. Heme occult positive in ED. GI consulted in ED. Admit to hospital medicine for further eval.     Overview/Hospital Course:  The patient was admitted and observed for any bleeding, persistent vomiting or change in blood pressure.  He was continued on octreotide and ppi. GI was consulted and planned an EGD. The patient has chronic thrombocytopenia, and this was monitored closely, with the plan to transfuse platelets for count less than 10,000 and stop dvt prophylaxis for count less then 50,000. EGD was done and revealed no acute findings. The patient had a 15-beat run of V-tach on 03/29. EKG was ordered and revealed no change from previous EKG's, with prolonged QT interval. Echo and cardiology consult were ordered. Patient placed on lopressor.    No new subjective & objective note has been filed under this hospital service since the last note was generated.      Assessment/Plan:      * Gastrointestinal  hemorrhage  GI plans EGD today  NPO.  Follow H/H closely, serial h&h q6h x 4.   Type and screen, transfuse blood as needed hgb<7.  IV Protonix and Octreotide transfusions, monitor plts closely  IV hydration.  IV antiemetics prn.       Ventricular tachycardia  EKG done, revealed no change from previous EKG's, with prolonged QT  Echo and cardiology consult ordered      HTN (hypertension)  Chronic, uncontrolled. Latest blood pressure and vitals reviewed-     Temp:  [97.3 °F (36.3 °C)-98.3 °F (36.8 °C)]   Pulse:  [62-93]   Resp:  [3-33]   BP: ()/()   SpO2:  [95 %-100 %] .   Home meds for hypertension were reviewed and noted below.   Hypertension Medications               amLODIPine (NORVASC) 10 MG tablet Take 1 tablet (10 mg total) by mouth once daily.    carvediloL (COREG) 12.5 MG tablet Take 1 tablet (12.5 mg total) by mouth 2 (two) times daily with meals.    furosemide (LASIX) 20 MG tablet Take 1 tablet (20 mg total) by mouth once daily.    spironolactone (ALDACTONE) 50 MG tablet Take 1 tablet (50 mg total) by mouth once daily.            While in the hospital, will manage blood pressure as follows; Adjust home antihypertensive regimen as follows- IV labetalol, holding home PO meds given varices and active hematemesis    Will utilize p.r.n. blood pressure medication only if patient's blood pressure greater than 180/110 and he develops symptoms such as worsening chest pain or shortness of breath.    Alcohol abuse  Banana bag  CIWA precautions  Ativan PRN withdrawal  Tele monitoring      CKD (chronic kidney disease) stage 3, GFR 30-59 ml/min  Creatine stable for now. BMP reviewed- noted Estimated Creatinine Clearance: 59.3 mL/min (A) (based on SCr of 1.9 mg/dL (H)). according to latest data. Based on current GFR, CKD stage is stage 3 - GFR 30-59.  Monitor UOP and serial BMP and adjust therapy as needed. Renally dose meds. Avoid nephrotoxic medications and procedures.    Alcoholic cirrhosis of liver  Patient  with known Cirrhosis with Child's class B. Co-morbidities are present and inclusive of esophageal varices and anemia.  MELD-Na score calculated; MELD 3.0: 20 at 3/29/2024  6:22 AM  MELD-Na: 18 at 3/29/2024  6:22 AM  Calculated from:  Serum Creatinine: 1.9 mg/dL at 3/29/2024  6:22 AM  Serum Sodium: 137 mmol/L at 3/29/2024  6:22 AM  Total Bilirubin: 2.7 mg/dL at 3/29/2024  6:22 AM  Serum Albumin: 3.1 g/dL at 3/29/2024  6:22 AM  INR(ratio): 1.2 at 3/28/2024  6:07 AM  Age at listing (hypothetical): 48 years  Sex: Male at 3/29/2024  6:22 AM      Continue chronic meds. Etiology likely ETOH. Will avoid any hepatotoxic meds, and monitor CBC/CMP/INR for synthetic function.     Thrombocytopenia  Patient was found to have thrombocytopenia, the likely etiology is secondary to cirrhosis/portal hypertension, will monitor the platelets Daily. Will transfuse if platelet count is <10k. Hold DVT prophylaxis if platelets are <50k. The patient's platelet results have been reviewed and are listed below.  Recent Labs   Lab 03/29/24  0622   PLT 59*             VTE Risk Mitigation (From admission, onward)           Ordered     Reason for No Pharmacological VTE Prophylaxis  Once        Question:  Reasons:  Answer:  Active Bleeding    03/27/24 1457     IP VTE HIGH RISK PATIENT  Once         03/27/24 1457     Place sequential compression device  Until discontinued         03/27/24 1457                    Discharge Planning   RADHA: 3/31/2024     Code Status: Full Code   Is the patient medically ready for discharge?:     Reason for patient still in hospital (select all that apply): Patient trending condition  Discharge Plan A: Home Health                  Sharon Jarvis MD, MD  Department of Hospital Medicine   [facility]

## 2024-03-29 NOTE — ASSESSMENT & PLAN NOTE
Patient was found to have thrombocytopenia, the likely etiology is secondary to cirrhosis/portal hypertension, will monitor the platelets Daily. Will transfuse if platelet count is <10k. Hold DVT prophylaxis if platelets are <50k. The patient's platelet results have been reviewed and are listed below.  Recent Labs   Lab 03/29/24  0622   PLT 59*

## 2024-03-30 LAB
ALBUMIN SERPL BCP-MCNC: 3.3 G/DL (ref 3.5–5.2)
ALP SERPL-CCNC: 80 U/L (ref 55–135)
ALT SERPL W/O P-5'-P-CCNC: 17 U/L (ref 10–44)
ANION GAP SERPL CALC-SCNC: 7 MMOL/L (ref 8–16)
AST SERPL-CCNC: 35 U/L (ref 10–40)
BACTERIA UR CULT: ABNORMAL
BASOPHILS # BLD AUTO: 0.02 K/UL (ref 0–0.2)
BASOPHILS NFR BLD: 0.3 % (ref 0–1.9)
BILIRUB SERPL-MCNC: 3.1 MG/DL (ref 0.1–1)
BUN SERPL-MCNC: 15 MG/DL (ref 6–20)
CALCIUM SERPL-MCNC: 8.5 MG/DL (ref 8.7–10.5)
CHLORIDE SERPL-SCNC: 104 MMOL/L (ref 95–110)
CO2 SERPL-SCNC: 25 MMOL/L (ref 23–29)
CREAT SERPL-MCNC: 1.9 MG/DL (ref 0.5–1.4)
DIFFERENTIAL METHOD BLD: ABNORMAL
EOSINOPHIL # BLD AUTO: 0.2 K/UL (ref 0–0.5)
EOSINOPHIL NFR BLD: 2.9 % (ref 0–8)
ERYTHROCYTE [DISTWIDTH] IN BLOOD BY AUTOMATED COUNT: 17.6 % (ref 11.5–14.5)
EST. GFR  (NO RACE VARIABLE): 42.7 ML/MIN/1.73 M^2
GLUCOSE SERPL-MCNC: 111 MG/DL (ref 70–110)
HCT VFR BLD AUTO: 32.2 % (ref 40–54)
HGB BLD-MCNC: 10.6 G/DL (ref 14–18)
IMM GRANULOCYTES # BLD AUTO: 0.03 K/UL (ref 0–0.04)
IMM GRANULOCYTES NFR BLD AUTO: 0.5 % (ref 0–0.5)
LYMPHOCYTES # BLD AUTO: 1.3 K/UL (ref 1–4.8)
LYMPHOCYTES NFR BLD: 19.9 % (ref 18–48)
MAGNESIUM SERPL-MCNC: 2 MG/DL (ref 1.6–2.6)
MCH RBC QN AUTO: 35.3 PG (ref 27–31)
MCHC RBC AUTO-ENTMCNC: 32.9 G/DL (ref 32–36)
MCV RBC AUTO: 107 FL (ref 82–98)
MONOCYTES # BLD AUTO: 0.6 K/UL (ref 0.3–1)
MONOCYTES NFR BLD: 9 % (ref 4–15)
NEUTROPHILS # BLD AUTO: 4.5 K/UL (ref 1.8–7.7)
NEUTROPHILS NFR BLD: 67.4 % (ref 38–73)
NRBC BLD-RTO: 0 /100 WBC
PLATELET # BLD AUTO: 72 K/UL (ref 150–450)
PMV BLD AUTO: 10.9 FL (ref 9.2–12.9)
POTASSIUM SERPL-SCNC: 4 MMOL/L (ref 3.5–5.1)
PROT SERPL-MCNC: 7.4 G/DL (ref 6–8.4)
RBC # BLD AUTO: 3 M/UL (ref 4.6–6.2)
SODIUM SERPL-SCNC: 136 MMOL/L (ref 136–145)
WBC # BLD AUTO: 6.64 K/UL (ref 3.9–12.7)

## 2024-03-30 PROCEDURE — 80053 COMPREHEN METABOLIC PANEL: CPT

## 2024-03-30 PROCEDURE — 83735 ASSAY OF MAGNESIUM: CPT

## 2024-03-30 PROCEDURE — 25000003 PHARM REV CODE 250: Performed by: INTERNAL MEDICINE

## 2024-03-30 PROCEDURE — 63600175 PHARM REV CODE 636 W HCPCS: Performed by: HOSPITALIST

## 2024-03-30 PROCEDURE — 12000002 HC ACUTE/MED SURGE SEMI-PRIVATE ROOM

## 2024-03-30 PROCEDURE — 85025 COMPLETE CBC W/AUTO DIFF WBC: CPT

## 2024-03-30 PROCEDURE — 99232 SBSQ HOSP IP/OBS MODERATE 35: CPT | Mod: ,,, | Performed by: INTERNAL MEDICINE

## 2024-03-30 PROCEDURE — C9113 INJ PANTOPRAZOLE SODIUM, VIA: HCPCS

## 2024-03-30 PROCEDURE — 25000003 PHARM REV CODE 250: Performed by: HOSPITALIST

## 2024-03-30 PROCEDURE — 63600175 PHARM REV CODE 636 W HCPCS

## 2024-03-30 PROCEDURE — 25000003 PHARM REV CODE 250

## 2024-03-30 PROCEDURE — 36415 COLL VENOUS BLD VENIPUNCTURE: CPT

## 2024-03-30 RX ORDER — PANTOPRAZOLE SODIUM 40 MG/1
40 TABLET, DELAYED RELEASE ORAL DAILY
Status: DISCONTINUED | OUTPATIENT
Start: 2024-03-30 | End: 2024-04-01 | Stop reason: HOSPADM

## 2024-03-30 RX ADMIN — METOPROLOL TARTRATE 50 MG: 50 TABLET, FILM COATED ORAL at 09:03

## 2024-03-30 RX ADMIN — HYDROMORPHONE HYDROCHLORIDE 1 MG: 1 INJECTION, SOLUTION INTRAMUSCULAR; INTRAVENOUS; SUBCUTANEOUS at 03:03

## 2024-03-30 RX ADMIN — PANTOPRAZOLE SODIUM 40 MG: 40 TABLET, DELAYED RELEASE ORAL at 09:03

## 2024-03-30 RX ADMIN — OCTREOTIDE ACETATE 50 MCG/HR: 500 INJECTION, SOLUTION INTRAVENOUS; SUBCUTANEOUS at 05:03

## 2024-03-30 RX ADMIN — HYDROMORPHONE HYDROCHLORIDE 1 MG: 1 INJECTION, SOLUTION INTRAMUSCULAR; INTRAVENOUS; SUBCUTANEOUS at 06:03

## 2024-03-30 RX ADMIN — PANTOPRAZOLE SODIUM 8 MG/HR: 40 INJECTION, POWDER, FOR SOLUTION INTRAVENOUS at 05:03

## 2024-03-30 RX ADMIN — CEFTRIAXONE SODIUM 1 G: 1 INJECTION, POWDER, FOR SOLUTION INTRAMUSCULAR; INTRAVENOUS at 03:03

## 2024-03-30 RX ADMIN — HYDROMORPHONE HYDROCHLORIDE 1 MG: 1 INJECTION, SOLUTION INTRAMUSCULAR; INTRAVENOUS; SUBCUTANEOUS at 10:03

## 2024-03-30 RX ADMIN — PANTOPRAZOLE SODIUM 8 MG/HR: 40 INJECTION, POWDER, FOR SOLUTION INTRAVENOUS at 12:03

## 2024-03-30 NOTE — ASSESSMENT & PLAN NOTE
Patient was found to have thrombocytopenia, the likely etiology is secondary to cirrhosis/portal hypertension, will monitor the platelets Daily. Will transfuse if platelet count is <10k. Hold DVT prophylaxis if platelets are <50k. The patient's platelet results have been reviewed and are listed below.  Recent Labs   Lab 03/30/24  0452   PLT 72*

## 2024-03-30 NOTE — PROGRESS NOTES
[facility]  Department of Cardiology  Progress Note    PATIENT NAME: Irvin Diaz Jr.  MRN: [id number]  TODAY'S DATE: 03/30/2024  ADMIT DATE: 3/27/2024    SUBJECTIVE     PRINCIPLE PROBLEM: Gastrointestinal hemorrhage    Patient is a 48-year-old male presented to the emergency room with bleeding issues he has been having heavy epistaxis for almost most of the day and he also complained of having hematemesis and dark tarry stools.  Patient has prior history of alcoholic cirrhosis and has had similar episodes in the past.  At the present time patient is comfortable denies any chest pain or tightness or heaviness denies any dizziness or lightheadedness or loss of consciousness.    Patient had a 15 beat of ventricular tachycardia on 3 /29 EKG was ordered and there was no change from the previous EKGs he did have prolonged QT interval.  Patient is currently on octreotide drip.     INTERVAL HISTORY:    3/30/2024  Patient is awake alert lying in bed.  Feels much better.    Has not had any further episodes of ventricular tachycardia.    Currently is off of Sandostatin.    Review of patient's allergies indicates:   Allergen Reactions    Ciprofloxacin hcl Hallucinations    Meperidine Hives     Pt medicated w/multiple medications (demerol, protonix, and zofran)  w/i 10 mins time frame prior to developing localized hives near IV site that med was administered. Pt reports he has/takes all other medications on daily basis.     Morphine Itching    Nsaids (non-steroidal anti-inflammatory drug)      Kidney Disease    Tylenol [acetaminophen]      Hx of liver disease       REVIEW OF SYSTEMS  CARDIOVASCULAR: No recent chest pain, palpitations, arm, neck, or jaw pain  RESPIRATORY: No recent fever, cough chills, SOB or congestion  : No blood in the urine  GI: History of bleeding including epistaxis hematemesis and dark tarry stools.  History of alcoholic cirrhosis   MUSCULOSKELETAL: No myalgias  NEURO: No  lightheadedness or dizziness  EYES: No Double vision, blurry, vision or headache     OBJECTIVE     VITAL SIGNS (Most Recent)  Temp: 98.1 °F (36.7 °C) (03/30/24 1132)  Pulse: 66 (03/30/24 1132)  Resp: 19 (03/30/24 1132)  BP: (!) 165/88 (03/30/24 1132)  SpO2: 99 % (03/30/24 1132)    VENTILATION STATUS  Resp: 19 (03/30/24 1132)  SpO2: 99 % (03/30/24 1132)           I & O (Last 24H):  Intake/Output Summary (Last 24 hours) at 3/30/2024 1517  Last data filed at 3/30/2024 0557  Gross per 24 hour   Intake 1110 ml   Output 950 ml   Net 160 ml       WEIGHTS  Wt Readings from Last 1 Encounters:   03/28/24 0151 106.6 kg (235 lb)   03/27/24 0818 106.6 kg (235 lb)       PHYSICAL EXAM  CONSTITUTIONAL: Well built, well nourished in no apparent distress  NECK: no carotid bruit, no JVD  LUNGS: CTA  CHEST WALL: no tenderness  HEART: regular rate and rhythm, S1, S2 normal, no murmur, click, rub or gallop   ABDOMEN: soft, appears full.    EXTREMITIES: Extremities normal, no edema  NEURO: AAO X 3    SCHEDULED MEDS:   metoprolol tartrate  50 mg Oral BID    pantoprazole  40 mg Oral Daily    silver nitrate applicators  2 applicator Topical (Top) Once       CONTINUOUS INFUSIONS:    PRN MEDS:aluminum-magnesium hydroxide-simethicone, HYDROmorphone, labetalol, lorazepam, magnesium oxide, magnesium oxide, melatonin, naloxone, ondansetron, potassium bicarbonate, potassium bicarbonate, potassium bicarbonate, potassium, sodium phosphates, potassium, sodium phosphates, potassium, sodium phosphates, sodium chloride 0.9%    LABS AND DIAGNOSTICS     CBC LAST 3 DAYS  Recent Labs   Lab 03/28/24  0607 03/28/24  1228 03/28/24  2056 03/29/24  0622 03/30/24  0452   WBC 5.49  --  5.34 5.50 6.64   RBC 2.52*  --  2.70* 2.61* 3.00*   HGB 9.1*  9.1*   < > 9.7* 9.1* 10.6*   HCT 27.0*  --  28.9* 27.7* 32.2*   *  --  107* 106* 107*   MCH 36.1*  --  35.9* 34.9* 35.3*   MCHC 33.7  --  33.6 32.9 32.9   RDW 17.4*  --  17.4* 17.3* 17.6*   PLT 50*  --  52* 59* 72*  "  MPV 10.9  --  10.7 10.7 10.9   GRAN 65.0  3.6  --   --  62.2  3.4 67.4  4.5   LYMPH 25.0  1.4  --   --  25.8  1.4 19.9  1.3   MONO 7.7  0.4  --   --  8.7  0.5 9.0  0.6   BASO 0.02  --   --  0.02 0.02   NRBC 0  --   --  0 0    < > = values in this interval not displayed.       COAGULATION LAST 3 DAYS  Recent Labs   Lab 03/27/24  0850 03/27/24  1935 03/28/24 0607   INR 1.2  --  1.2   APTT  --  29.3  --        CHEMISTRY LAST 3 DAYS  Recent Labs   Lab 03/27/24  0850 03/27/24  0850 03/28/24  0607 03/28/24 2056 03/29/24 0622 03/30/24  0452     --  138 138 137 136   K 4.1  --  3.7 3.9 4.0 4.0     --  105 106 106 104   CO2 25  --  26 26 27 25   ANIONGAP 11  --  7* 6* 4* 7*   BUN 31*  --  29* 22* 20 15   CREATININE 1.6*  --  1.7* 1.8* 1.9* 1.9*     --  122* 108 111* 111*   CALCIUM 8.9  --  8.3* 8.4* 8.2* 8.5*   MG  --    < > 1.7 1.8 1.9 2.0   ALBUMIN 3.8  --  3.1*  --  3.1* 3.3*   BILITOT 3.9*  --  3.2*  --  2.7* 3.1*   PROT 8.6*  --  6.7  --  6.8 7.4   ALKPHOS 102  --  75  --  75 80   ALT 26  --  18  --  18 17   AST 47*  --  33  --  37 35   LIPASE 67*  --   --   --   --   --     < > = values in this interval not displayed.       CARDIAC PROFILE LAST 3 DAYS  Recent Labs   Lab 03/28/24 2056   TROPONINIHS 18.0*       ENDOCRINE LAST 3 DAYS  No results for input(s): "TSH", "PROCAL" in the last 168 hours.    LAST ARTERIAL BLOOD GAS  ABG  No results for input(s): "PH", "PO2", "PCO2", "HCO3", "BE" in the last 168 hours.    LAST 7 DAYS MICROBIOLOGY   Microbiology Results (last 7 days)       Procedure Component Value Units Date/Time    Urine culture [[id number]]  (Abnormal)  (Susceptibility) Collected: 03/27/24 2092    Order Status: Completed Specimen: Urine Updated: 03/30/24 0745     Urine Culture, Routine STAPHYLOCOCCUS AUREUS  >100,000cfu/ml      Narrative:      Specimen Source->Urine            MOST RECENT IMAGING  Echo Saline Bubble? No    Left Ventricle: The left ventricle is normal in size. " Normal wall   thickness. There is concentric hypertrophy. There is normal systolic   function. Biplane (2D) method of discs ejection fraction is 57%. There is   normal diastolic function.    Right Ventricle: Normal right ventricular cavity size. Wall thickness   is normal. Right ventricle wall motion  is normal. Systolic function is   normal.    Left Atrium: Left atrium is mildly dilated.    Mitral Valve: There is mild to moderate regurgitation with a centrally   directed jet.    Pulmonary Artery: The estimated pulmonary artery systolic pressure is   19 mmHg.    IVC/SVC: Normal venous pressure at 3 mmHg.      [place]  Results for orders placed during the hospital encounter of 03/27/24    Echo Saline Bubble? No    Interpretation Summary    Left Ventricle: The left ventricle is normal in size. Normal wall thickness. There is concentric hypertrophy. There is normal systolic function. Biplane (2D) method of discs ejection fraction is 57%. There is normal diastolic function.    Right Ventricle: Normal right ventricular cavity size. Wall thickness is normal. Right ventricle wall motion  is normal. Systolic function is normal.    Left Atrium: Left atrium is mildly dilated.    Mitral Valve: There is mild to moderate regurgitation with a centrally directed jet.    Pulmonary Artery: The estimated pulmonary artery systolic pressure is 19 mmHg.    IVC/SVC: Normal venous pressure at 3 mmHg.      CURRENT/PREVIOUS VISIT EKG  Results for orders placed or performed during the hospital encounter of 03/27/24   EKG 12-lead    Collection Time: 03/28/24  6:20 PM   Result Value Ref Range    QRS Duration 88 ms    OHS QTC Calculation 503 ms    Narrative    Test Reason : I49.3,    Vent. Rate : 072 BPM     Atrial Rate : 072 BPM     P-R Int : 146 ms          QRS Dur : 088 ms      QT Int : 460 ms       P-R-T Axes : 064 074 064 degrees     QTc Int : 503 ms    Normal sinus rhythm  Prolonged QT  Abnormal ECG  When compared with ECG of 27-MAR-2024  19:01,  No significant change was found    Referred By: [email or web address]   SELF           Confirmed By:        ASSESSMENT/PLAN:     Active Hospital Problems    Diagnosis    *Gastrointestinal hemorrhage    Ventricular tachycardia    HTN (hypertension)    Alcohol abuse    CKD (chronic kidney disease) stage 3, GFR 30-59 ml/min    Alcoholic cirrhosis of liver    Thrombocytopenia       ASSESSMENT & PLAN:   1. Ventricular tachycardia   2. Ethanol abuse/alcoholic cirrhosis of the liver  3. Thrombocytopenia   4. Chronic kidney disease  5. Essential hypertension  6. Gastrointestinal hemorrhage.      RECOMMENDATIONS:    1. Patient has not had any further episodes of ventricular tachycardia  2. Continue metoprolol tartrate 50 mg p.o. b.i.d.   3. Patient is currently off of Sandostatin.  4. Will repeat EKG in a.m..  5. Also discontinue Zofran.        1. Patient had 21 beat run of ventricular tachycardia and patient is currently on octreotide drip  Recommend to discontinue octreotide drip.    2. Octreotide has prolonged his QT interval his corrected QT interval is 503 milliseconds.  And patient has a risk of going back into ventricular tachycardia.  3. Started him on metoprolol tartrate 50 mg p.o. b.i.d..  Continue the same.  4. 2D echocardiogram to evaluate for alcoholic cardiomyopathy.  And that would also explain for ventricular tachycardia.    He would also need further cardiac evaluation including stress myocardial perfusion study when he is more stable.  5. Further recommendations to follow will closely follow with you thank you for the consultation.    Carl Abreu MD  Date of Service: 03/30/2024  3:17 PM

## 2024-03-30 NOTE — ASSESSMENT & PLAN NOTE
Chronic, uncontrolled. Latest blood pressure and vitals reviewed-     Temp:  [97.8 °F (36.6 °C)-98.8 °F (37.1 °C)]   Pulse:  [66-74]   Resp:  [16-19]   BP: (148-218)/()   SpO2:  [98 %-100 %] .   Home meds for hypertension were reviewed and noted below.   Hypertension Medications               amLODIPine (NORVASC) 10 MG tablet Take 1 tablet (10 mg total) by mouth once daily.    carvediloL (COREG) 12.5 MG tablet Take 1 tablet (12.5 mg total) by mouth 2 (two) times daily with meals.    furosemide (LASIX) 20 MG tablet Take 1 tablet (20 mg total) by mouth once daily.    spironolactone (ALDACTONE) 50 MG tablet Take 1 tablet (50 mg total) by mouth once daily.            While in the hospital, will manage blood pressure as follows; Adjust home antihypertensive regimen as follows- IV labetalol, holding home PO meds given varices and active hematemesis    Will utilize p.r.n. blood pressure medication only if patient's blood pressure greater than 180/110 and he develops symptoms such as worsening chest pain or shortness of breath.

## 2024-03-30 NOTE — SUBJECTIVE & OBJECTIVE
Interval History: No further bleeding. EGD results discussed and need for outpatient ENT discussed. Patient denies urinary symptoms. UCx with S aureus, ceftriaxone started. Cardiology following for Vtach episodes.     Review of Systems   Respiratory:  Negative for shortness of breath.    Gastrointestinal:  Negative for blood in stool.     Objective:     Vital Signs (Most Recent):  Temp: 98.1 °F (36.7 °C) (03/30/24 1132)  Pulse: 66 (03/30/24 1132)  Resp: 19 (03/30/24 1132)  BP: (!) 165/88 (03/30/24 1132)  SpO2: 99 % (03/30/24 1132) Vital Signs (24h Range):  Temp:  [97.8 °F (36.6 °C)-98.8 °F (37.1 °C)] 98.1 °F (36.7 °C)  Pulse:  [66-74] 66  Resp:  [16-19] 19  SpO2:  [98 %-100 %] 99 %  BP: (148-218)/() 165/88     Weight: 106.6 kg (235 lb)  Body mass index is 31.87 kg/m².    Intake/Output Summary (Last 24 hours) at 3/30/2024 1510  Last data filed at 3/30/2024 0557  Gross per 24 hour   Intake 1110 ml   Output 950 ml   Net 160 ml           Physical Exam  Vitals reviewed.   Constitutional:       General: He is not in acute distress.     Appearance: Normal appearance. He is normal weight. He is not ill-appearing.   HENT:      Head: Normocephalic and atraumatic.      Nose: Nose normal.      Mouth/Throat:      Mouth: Mucous membranes are moist.      Pharynx: Oropharynx is clear.   Eyes:      General: No scleral icterus.     Extraocular Movements: Extraocular movements intact.      Conjunctiva/sclera: Conjunctivae normal.      Pupils: Pupils are equal, round, and reactive to light.   Cardiovascular:      Rate and Rhythm: Normal rate and regular rhythm.      Pulses: Normal pulses.      Heart sounds: Normal heart sounds. No murmur heard.     No gallop.   Pulmonary:      Effort: Pulmonary effort is normal. No respiratory distress.      Breath sounds: Normal breath sounds. No wheezing, rhonchi or rales.   Chest:      Chest wall: No tenderness.   Abdominal:      General: Abdomen is flat. There is no distension.       Palpations: Abdomen is soft.      Tenderness: There is no abdominal tenderness.   Musculoskeletal:         General: No swelling or tenderness.      Cervical back: Normal range of motion and neck supple. No rigidity or tenderness.      Right lower leg: No edema.      Left lower leg: No edema.   Skin:     General: Skin is warm and dry.   Neurological:      General: No focal deficit present.      Mental Status: He is alert and oriented to person, place, and time. Mental status is at baseline.      Motor: No weakness.   Psychiatric:         Mood and Affect: Mood normal.         Behavior: Behavior normal.         Thought Content: Thought content normal.             Significant Labs: All pertinent labs within the past 24 hours have been reviewed.  BMP:   Recent Labs   Lab 03/30/24  0452   *      K 4.0      CO2 25   BUN 15   CREATININE 1.9*   CALCIUM 8.5*   MG 2.0       CBC:   Recent Labs   Lab 03/28/24 2056 03/29/24  0622 03/30/24  0452   WBC 5.34 5.50 6.64   HGB 9.7* 9.1* 10.6*   HCT 28.9* 27.7* 32.2*   PLT 52* 59* 72*       CMP:   Recent Labs   Lab 03/28/24 2056 03/29/24  0622 03/30/24  0452    137 136   K 3.9 4.0 4.0    106 104   CO2 26 27 25    111* 111*   BUN 22* 20 15   CREATININE 1.8* 1.9* 1.9*   CALCIUM 8.4* 8.2* 8.5*   PROT  --  6.8 7.4   ALBUMIN  --  3.1* 3.3*   BILITOT  --  2.7* 3.1*   ALKPHOS  --  75 80   AST  --  37 35   ALT  --  18 17   ANIONGAP 6* 4* 7*         Significant Imaging: I have reviewed all pertinent imaging results/findings within the past 24 hours.

## 2024-03-30 NOTE — PROGRESS NOTES
[facility]  Progress Note    Patient Name: Irvin Diaz Jr.  MRN: [id number]  Patient Class: IP- Inpatient   Admission Date: 3/27/2024  Length of Stay: 3 days  Attending Physician: Mechelle Toscano MD  Primary Care Provider: Edouard Gibson MD        Subjective:     Principal Problem:Gastrointestinal hemorrhage        HPI:  48-year-old male presented to ED for eval of bleeding. Patient reported he has been having intermittent epistaxis since yesterday. Patient also reported today he had hematemesis and tarry stools. Patient has hx of alcoholic cirrhosis. Patient reported he had not had a drink for about 2 months prior to 2 days ago when he did consume alcohol. Patient has had episodes similar to this in the past and has required transfusions. Denies taking blood thinners or NSAIDs. Denied abd pain. Denied fever. Hgb & hct stable. Noted with thrombocytopenia, appears to be around baseline. Noted to have bright red bloody emesis to emesis bag on exam, epistaxis has ceased. Heme occult positive in ED. GI consulted in ED. Admit to hospital medicine for further eval.     Overview/Hospital Course:  The patient was admitted and observed for any bleeding, persistent vomiting or change in blood pressure.  He was continued on octreotide and ppi. GI was consulted and planned an EGD. The patient has chronic thrombocytopenia, and this was monitored closely, with the plan to transfuse platelets for count less than 10,000 and stop dvt prophylaxis for count less then 50,000. EGD was done and revealed no acute findings. The patient had a 15-beat run of V-tach on 03/29. EKG was ordered and revealed no change from previous EKG's, with prolonged QT interval. Echo and cardiology consult were ordered. Patient placed on lopressor.    Interval History: No further bleeding. EGD results discussed and need for outpatient ENT discussed. Patient denies urinary symptoms. UCx with S aureus, ceftriaxone  started. Cardiology following for Vtach episodes.     Review of Systems   Respiratory:  Negative for shortness of breath.    Gastrointestinal:  Negative for blood in stool.     Objective:     Vital Signs (Most Recent):  Temp: 98.1 °F (36.7 °C) (03/30/24 1132)  Pulse: 66 (03/30/24 1132)  Resp: 19 (03/30/24 1132)  BP: (!) 165/88 (03/30/24 1132)  SpO2: 99 % (03/30/24 1132) Vital Signs (24h Range):  Temp:  [97.8 °F (36.6 °C)-98.8 °F (37.1 °C)] 98.1 °F (36.7 °C)  Pulse:  [66-74] 66  Resp:  [16-19] 19  SpO2:  [98 %-100 %] 99 %  BP: (148-218)/() 165/88     Weight: 106.6 kg (235 lb)  Body mass index is 31.87 kg/m².    Intake/Output Summary (Last 24 hours) at 3/30/2024 1510  Last data filed at 3/30/2024 0557  Gross per 24 hour   Intake 1110 ml   Output 950 ml   Net 160 ml           Physical Exam  Vitals reviewed.   Constitutional:       General: He is not in acute distress.     Appearance: Normal appearance. He is normal weight. He is not ill-appearing.   HENT:      Head: Normocephalic and atraumatic.      Nose: Nose normal.      Mouth/Throat:      Mouth: Mucous membranes are moist.      Pharynx: Oropharynx is clear.   Eyes:      General: No scleral icterus.     Extraocular Movements: Extraocular movements intact.      Conjunctiva/sclera: Conjunctivae normal.      Pupils: Pupils are equal, round, and reactive to light.   Cardiovascular:      Rate and Rhythm: Normal rate and regular rhythm.      Pulses: Normal pulses.      Heart sounds: Normal heart sounds. No murmur heard.     No gallop.   Pulmonary:      Effort: Pulmonary effort is normal. No respiratory distress.      Breath sounds: Normal breath sounds. No wheezing, rhonchi or rales.   Chest:      Chest wall: No tenderness.   Abdominal:      General: Abdomen is flat. There is no distension.      Palpations: Abdomen is soft.      Tenderness: There is no abdominal tenderness.   Musculoskeletal:         General: No swelling or tenderness.      Cervical back: Normal  range of motion and neck supple. No rigidity or tenderness.      Right lower leg: No edema.      Left lower leg: No edema.   Skin:     General: Skin is warm and dry.   Neurological:      General: No focal deficit present.      Mental Status: He is alert and oriented to person, place, and time. Mental status is at baseline.      Motor: No weakness.   Psychiatric:         Mood and Affect: Mood normal.         Behavior: Behavior normal.         Thought Content: Thought content normal.             Significant Labs: All pertinent labs within the past 24 hours have been reviewed.  BMP:   Recent Labs   Lab 03/30/24  0452   *      K 4.0      CO2 25   BUN 15   CREATININE 1.9*   CALCIUM 8.5*   MG 2.0       CBC:   Recent Labs   Lab 03/28/24 2056 03/29/24 0622 03/30/24 0452   WBC 5.34 5.50 6.64   HGB 9.7* 9.1* 10.6*   HCT 28.9* 27.7* 32.2*   PLT 52* 59* 72*       CMP:   Recent Labs   Lab 03/28/24 2056 03/29/24 0622 03/30/24  0452    137 136   K 3.9 4.0 4.0    106 104   CO2 26 27 25    111* 111*   BUN 22* 20 15   CREATININE 1.8* 1.9* 1.9*   CALCIUM 8.4* 8.2* 8.5*   PROT  --  6.8 7.4   ALBUMIN  --  3.1* 3.3*   BILITOT  --  2.7* 3.1*   ALKPHOS  --  75 80   AST  --  37 35   ALT  --  18 17   ANIONGAP 6* 4* 7*         Significant Imaging: I have reviewed all pertinent imaging results/findings within the past 24 hours.    Assessment/Plan:      * Gastrointestinal hemorrhage  EGD with small nonbleeding varices  Bleeding thought to be from epistaxis, not GI source      Ventricular tachycardia  EKG done, revealed no change from previous EKG's, with prolonged QT  Cardiology following      HTN (hypertension)  Chronic, uncontrolled. Latest blood pressure and vitals reviewed-     Temp:  [97.8 °F (36.6 °C)-98.8 °F (37.1 °C)]   Pulse:  [66-74]   Resp:  [16-19]   BP: (148-218)/()   SpO2:  [98 %-100 %] .   Home meds for hypertension were reviewed and noted below.   Hypertension Medications                amLODIPine (NORVASC) 10 MG tablet Take 1 tablet (10 mg total) by mouth once daily.    carvediloL (COREG) 12.5 MG tablet Take 1 tablet (12.5 mg total) by mouth 2 (two) times daily with meals.    furosemide (LASIX) 20 MG tablet Take 1 tablet (20 mg total) by mouth once daily.    spironolactone (ALDACTONE) 50 MG tablet Take 1 tablet (50 mg total) by mouth once daily.            While in the hospital, will manage blood pressure as follows; Adjust home antihypertensive regimen as follows- IV labetalol, holding home PO meds given varices and active hematemesis    Will utilize p.r.n. blood pressure medication only if patient's blood pressure greater than 180/110 and he develops symptoms such as worsening chest pain or shortness of breath.    Alcohol abuse  Banana bag  CIWA precautions  Ativan PRN withdrawal  Tele monitoring      CKD (chronic kidney disease) stage 3, GFR 30-59 ml/min  Creatine stable for now. BMP reviewed- noted Estimated Creatinine Clearance: 59.3 mL/min (A) (based on SCr of 1.9 mg/dL (H)). according to latest data. Based on current GFR, CKD stage is stage 3 - GFR 30-59.  Monitor UOP and serial BMP and adjust therapy as needed. Renally dose meds. Avoid nephrotoxic medications and procedures.    Alcoholic cirrhosis of liver  Patient with known Cirrhosis with Child's class B. Co-morbidities are present and inclusive of esophageal varices and anemia.  MELD-Na score calculated; MELD 3.0: 21 at 3/30/2024  4:52 AM  MELD-Na: 20 at 3/30/2024  4:52 AM  Calculated from:  Serum Creatinine: 1.9 mg/dL at 3/30/2024  4:52 AM  Serum Sodium: 136 mmol/L at 3/30/2024  4:52 AM  Total Bilirubin: 3.1 mg/dL at 3/30/2024  4:52 AM  Serum Albumin: 3.3 g/dL at 3/30/2024  4:52 AM  INR(ratio): 1.2 at 3/28/2024  6:07 AM  Age at listing (hypothetical): 48 years  Sex: Male at 3/30/2024  4:52 AM      Continue chronic meds. Etiology likely ETOH. Will avoid any hepatotoxic meds, and monitor CBC/CMP/INR for synthetic function.      Thrombocytopenia  Patient was found to have thrombocytopenia, the likely etiology is secondary to cirrhosis/portal hypertension, will monitor the platelets Daily. Will transfuse if platelet count is <10k. Hold DVT prophylaxis if platelets are <50k. The patient's platelet results have been reviewed and are listed below.  Recent Labs   Lab 03/30/24  0452   PLT 72*             VTE Risk Mitigation (From admission, onward)           Ordered     Reason for No Pharmacological VTE Prophylaxis  Once        Question:  Reasons:  Answer:  Active Bleeding    03/27/24 1457     IP VTE HIGH RISK PATIENT  Once         03/27/24 1457     Place sequential compression device  Until discontinued         03/27/24 1457                    Discharge Planning   RADHA: 3/31/2024     Code Status: Full Code   Is the patient medically ready for discharge?:     Reason for patient still in hospital (select all that apply): Patient trending condition and Treatment  Discharge Plan A: Home Health                  Mechelle Toscano MD  Department of Hospital Medicine   [facility]

## 2024-03-30 NOTE — ASSESSMENT & PLAN NOTE
Patient with known Cirrhosis with Child's class B. Co-morbidities are present and inclusive of esophageal varices and anemia.  MELD-Na score calculated; MELD 3.0: 21 at 3/30/2024  4:52 AM  MELD-Na: 20 at 3/30/2024  4:52 AM  Calculated from:  Serum Creatinine: 1.9 mg/dL at 3/30/2024  4:52 AM  Serum Sodium: 136 mmol/L at 3/30/2024  4:52 AM  Total Bilirubin: 3.1 mg/dL at 3/30/2024  4:52 AM  Serum Albumin: 3.3 g/dL at 3/30/2024  4:52 AM  INR(ratio): 1.2 at 3/28/2024  6:07 AM  Age at listing (hypothetical): 48 years  Sex: Male at 3/30/2024  4:52 AM      Continue chronic meds. Etiology likely ETOH. Will avoid any hepatotoxic meds, and monitor CBC/CMP/INR for synthetic function.

## 2024-03-31 LAB
ALBUMIN SERPL BCP-MCNC: 3.4 G/DL (ref 3.5–5.2)
ALP SERPL-CCNC: 84 U/L (ref 55–135)
ALT SERPL W/O P-5'-P-CCNC: 16 U/L (ref 10–44)
ANION GAP SERPL CALC-SCNC: 6 MMOL/L (ref 8–16)
AST SERPL-CCNC: 33 U/L (ref 10–40)
BASOPHILS # BLD AUTO: 0.03 K/UL (ref 0–0.2)
BASOPHILS NFR BLD: 0.4 % (ref 0–1.9)
BILIRUB SERPL-MCNC: 3.5 MG/DL (ref 0.1–1)
BUN SERPL-MCNC: 14 MG/DL (ref 6–20)
CALCIUM SERPL-MCNC: 8.7 MG/DL (ref 8.7–10.5)
CHLORIDE SERPL-SCNC: 103 MMOL/L (ref 95–110)
CO2 SERPL-SCNC: 25 MMOL/L (ref 23–29)
CREAT SERPL-MCNC: 2.1 MG/DL (ref 0.5–1.4)
DIFFERENTIAL METHOD BLD: ABNORMAL
EOSINOPHIL # BLD AUTO: 0.2 K/UL (ref 0–0.5)
EOSINOPHIL NFR BLD: 2.8 % (ref 0–8)
ERYTHROCYTE [DISTWIDTH] IN BLOOD BY AUTOMATED COUNT: 17.8 % (ref 11.5–14.5)
EST. GFR  (NO RACE VARIABLE): 37.9 ML/MIN/1.73 M^2
GLUCOSE SERPL-MCNC: 108 MG/DL (ref 70–110)
HCT VFR BLD AUTO: 31.7 % (ref 40–54)
HGB BLD-MCNC: 10.6 G/DL (ref 14–18)
IMM GRANULOCYTES # BLD AUTO: 0.02 K/UL (ref 0–0.04)
IMM GRANULOCYTES NFR BLD AUTO: 0.3 % (ref 0–0.5)
LYMPHOCYTES # BLD AUTO: 2 K/UL (ref 1–4.8)
LYMPHOCYTES NFR BLD: 26.8 % (ref 18–48)
MAGNESIUM SERPL-MCNC: 2.1 MG/DL (ref 1.6–2.6)
MCH RBC QN AUTO: 35.3 PG (ref 27–31)
MCHC RBC AUTO-ENTMCNC: 33.4 G/DL (ref 32–36)
MCV RBC AUTO: 106 FL (ref 82–98)
MONOCYTES # BLD AUTO: 0.8 K/UL (ref 0.3–1)
MONOCYTES NFR BLD: 11.2 % (ref 4–15)
NEUTROPHILS # BLD AUTO: 4.3 K/UL (ref 1.8–7.7)
NEUTROPHILS NFR BLD: 58.5 % (ref 38–73)
NRBC BLD-RTO: 0 /100 WBC
PLATELET # BLD AUTO: 75 K/UL (ref 150–450)
PMV BLD AUTO: 10.7 FL (ref 9.2–12.9)
POTASSIUM SERPL-SCNC: 3.8 MMOL/L (ref 3.5–5.1)
PROT SERPL-MCNC: 7.6 G/DL (ref 6–8.4)
RBC # BLD AUTO: 3 M/UL (ref 4.6–6.2)
SODIUM SERPL-SCNC: 134 MMOL/L (ref 136–145)
WBC # BLD AUTO: 7.42 K/UL (ref 3.9–12.7)

## 2024-03-31 PROCEDURE — 93010 ELECTROCARDIOGRAM REPORT: CPT | Mod: ,,, | Performed by: GENERAL PRACTICE

## 2024-03-31 PROCEDURE — 25000003 PHARM REV CODE 250: Performed by: HOSPITALIST

## 2024-03-31 PROCEDURE — 85025 COMPLETE CBC W/AUTO DIFF WBC: CPT

## 2024-03-31 PROCEDURE — 63600175 PHARM REV CODE 636 W HCPCS: Performed by: HOSPITALIST

## 2024-03-31 PROCEDURE — 63600175 PHARM REV CODE 636 W HCPCS

## 2024-03-31 PROCEDURE — 83735 ASSAY OF MAGNESIUM: CPT

## 2024-03-31 PROCEDURE — 80053 COMPREHEN METABOLIC PANEL: CPT

## 2024-03-31 PROCEDURE — 93005 ELECTROCARDIOGRAM TRACING: CPT | Performed by: GENERAL PRACTICE

## 2024-03-31 PROCEDURE — 36415 COLL VENOUS BLD VENIPUNCTURE: CPT

## 2024-03-31 PROCEDURE — 12000002 HC ACUTE/MED SURGE SEMI-PRIVATE ROOM

## 2024-03-31 PROCEDURE — 25000003 PHARM REV CODE 250: Performed by: INTERNAL MEDICINE

## 2024-03-31 RX ORDER — LACTULOSE 10 G/15ML
20 SOLUTION ORAL EVERY 6 HOURS PRN
Status: DISCONTINUED | OUTPATIENT
Start: 2024-03-31 | End: 2024-04-01 | Stop reason: HOSPADM

## 2024-03-31 RX ADMIN — LACTULOSE 20 G: 20 SOLUTION ORAL at 12:03

## 2024-03-31 RX ADMIN — CEFTRIAXONE SODIUM 1 G: 1 INJECTION, POWDER, FOR SOLUTION INTRAMUSCULAR; INTRAVENOUS at 05:03

## 2024-03-31 RX ADMIN — METOPROLOL TARTRATE 50 MG: 50 TABLET, FILM COATED ORAL at 09:03

## 2024-03-31 RX ADMIN — HYDROMORPHONE HYDROCHLORIDE 1 MG: 1 INJECTION, SOLUTION INTRAMUSCULAR; INTRAVENOUS; SUBCUTANEOUS at 06:03

## 2024-03-31 RX ADMIN — HYDROMORPHONE HYDROCHLORIDE 1 MG: 1 INJECTION, SOLUTION INTRAMUSCULAR; INTRAVENOUS; SUBCUTANEOUS at 12:03

## 2024-03-31 RX ADMIN — PANTOPRAZOLE SODIUM 40 MG: 40 TABLET, DELAYED RELEASE ORAL at 09:03

## 2024-03-31 RX ADMIN — HYDROMORPHONE HYDROCHLORIDE 1 MG: 1 INJECTION, SOLUTION INTRAMUSCULAR; INTRAVENOUS; SUBCUTANEOUS at 04:03

## 2024-03-31 NOTE — SUBJECTIVE & OBJECTIVE
Interval History: Patient seen and examined. EKG this am with sinus bradycardia. Cardiology following    Review of Systems   Respiratory:  Negative for shortness of breath.    Gastrointestinal:  Negative for blood in stool.     Objective:     Vital Signs (Most Recent):  Temp: 98 °F (36.7 °C) (03/31/24 1137)  Pulse: 61 (03/31/24 1137)  Resp: 17 (03/31/24 1206)  BP: 139/73 (03/31/24 1137)  SpO2: 99 % (03/31/24 1137) Vital Signs (24h Range):  Temp:  [97.9 °F (36.6 °C)-98.8 °F (37.1 °C)] 98 °F (36.7 °C)  Pulse:  [56-69] 61  Resp:  [17-19] 17  SpO2:  [95 %-99 %] 99 %  BP: (139-160)/(73-92) 139/73     Weight: 106.6 kg (235 lb)  Body mass index is 31.87 kg/m².    Intake/Output Summary (Last 24 hours) at 3/31/2024 1407  Last data filed at 3/31/2024 1316  Gross per 24 hour   Intake 480 ml   Output 1600 ml   Net -1120 ml           Physical Exam  Vitals reviewed.   Constitutional:       General: He is not in acute distress.     Appearance: Normal appearance. He is normal weight. He is not ill-appearing.   HENT:      Head: Normocephalic and atraumatic.      Nose: Nose normal.      Mouth/Throat:      Mouth: Mucous membranes are moist.      Pharynx: Oropharynx is clear.   Eyes:      General: No scleral icterus.     Extraocular Movements: Extraocular movements intact.      Conjunctiva/sclera: Conjunctivae normal.      Pupils: Pupils are equal, round, and reactive to light.   Cardiovascular:      Rate and Rhythm: Normal rate and regular rhythm.      Pulses: Normal pulses.      Heart sounds: Normal heart sounds. No murmur heard.     No gallop.   Pulmonary:      Effort: Pulmonary effort is normal. No respiratory distress.      Breath sounds: Normal breath sounds. No wheezing, rhonchi or rales.   Chest:      Chest wall: No tenderness.   Abdominal:      General: Abdomen is flat. There is no distension.      Palpations: Abdomen is soft.      Tenderness: There is no abdominal tenderness.   Musculoskeletal:         General: No swelling or  tenderness.      Cervical back: Normal range of motion and neck supple. No rigidity or tenderness.      Right lower leg: No edema.      Left lower leg: No edema.   Skin:     General: Skin is warm and dry.   Neurological:      General: No focal deficit present.      Mental Status: He is alert and oriented to person, place, and time. Mental status is at baseline.      Motor: No weakness.   Psychiatric:         Mood and Affect: Mood normal.         Behavior: Behavior normal.         Thought Content: Thought content normal.             Significant Labs: All pertinent labs within the past 24 hours have been reviewed.  BMP:   Recent Labs   Lab 03/31/24  0736      *   K 3.8      CO2 25   BUN 14   CREATININE 2.1*   CALCIUM 8.7   MG 2.1       CBC:   Recent Labs   Lab 03/30/24  0452 03/31/24  0736   WBC 6.64 7.42   HGB 10.6* 10.6*   HCT 32.2* 31.7*   PLT 72* 75*       CMP:   Recent Labs   Lab 03/30/24  0452 03/31/24  0736    134*   K 4.0 3.8    103   CO2 25 25   * 108   BUN 15 14   CREATININE 1.9* 2.1*   CALCIUM 8.5* 8.7   PROT 7.4 7.6   ALBUMIN 3.3* 3.4*   BILITOT 3.1* 3.5*   ALKPHOS 80 84   AST 35 33   ALT 17 16   ANIONGAP 7* 6*         Significant Imaging: I have reviewed all pertinent imaging results/findings within the past 24 hours.

## 2024-03-31 NOTE — ASSESSMENT & PLAN NOTE
Creatine stable for now. BMP reviewed- noted Estimated Creatinine Clearance: 53.7 mL/min (A) (based on SCr of 2.1 mg/dL (H)). according to latest data. Based on current GFR, CKD stage is stage 3 - GFR 30-59.  Monitor UOP and serial BMP and adjust therapy as needed. Renally dose meds. Avoid nephrotoxic medications and procedures.

## 2024-03-31 NOTE — PROGRESS NOTES
[facility] Medicine  Progress Note    Patient Name: Irvin Diaz Jr.  MRN: [id number]  Patient Class: IP- Inpatient   Admission Date: 3/27/2024  Length of Stay: 4 days  Attending Physician: Mechelle Toscano MD  Primary Care Provider: Edouard Gibson MD        Subjective:     Principal Problem:Gastrointestinal hemorrhage        HPI:  48-year-old male presented to ED for eval of bleeding. Patient reported he has been having intermittent epistaxis since yesterday. Patient also reported today he had hematemesis and tarry stools. Patient has hx of alcoholic cirrhosis. Patient reported he had not had a drink for about 2 months prior to 2 days ago when he did consume alcohol. Patient has had episodes similar to this in the past and has required transfusions. Denies taking blood thinners or NSAIDs. Denied abd pain. Denied fever. Hgb & hct stable. Noted with thrombocytopenia, appears to be around baseline. Noted to have bright red bloody emesis to emesis bag on exam, epistaxis has ceased. Heme occult positive in ED. GI consulted in ED. Admit to hospital medicine for further eval.     Overview/Hospital Course:  The patient was admitted and observed for any bleeding, persistent vomiting or change in blood pressure.  He was continued on octreotide and ppi. GI was consulted and planned an EGD. The patient has chronic thrombocytopenia, and this was monitored closely, with the plan to transfuse platelets for count less than 10,000 and stop dvt prophylaxis for count less then 50,000. EGD was done and revealed no acute findings. The patient had a 15-beat run of V-tach on 03/29. EKG was ordered and revealed no change from previous EKG's, with prolonged QT interval. Echo and cardiology consult were ordered. Patient placed on lopressor.    Interval History: Patient seen and examined. EKG this am with sinus bradycardia. Cardiology following    Review of Systems   Respiratory:  Negative for shortness of  breath.    Gastrointestinal:  Negative for blood in stool.     Objective:     Vital Signs (Most Recent):  Temp: 98 °F (36.7 °C) (03/31/24 1137)  Pulse: 61 (03/31/24 1137)  Resp: 17 (03/31/24 1206)  BP: 139/73 (03/31/24 1137)  SpO2: 99 % (03/31/24 1137) Vital Signs (24h Range):  Temp:  [97.9 °F (36.6 °C)-98.8 °F (37.1 °C)] 98 °F (36.7 °C)  Pulse:  [56-69] 61  Resp:  [17-19] 17  SpO2:  [95 %-99 %] 99 %  BP: (139-160)/(73-92) 139/73     Weight: 106.6 kg (235 lb)  Body mass index is 31.87 kg/m².    Intake/Output Summary (Last 24 hours) at 3/31/2024 1407  Last data filed at 3/31/2024 1316  Gross per 24 hour   Intake 480 ml   Output 1600 ml   Net -1120 ml           Physical Exam  Vitals reviewed.   Constitutional:       General: He is not in acute distress.     Appearance: Normal appearance. He is normal weight. He is not ill-appearing.   HENT:      Head: Normocephalic and atraumatic.      Nose: Nose normal.      Mouth/Throat:      Mouth: Mucous membranes are moist.      Pharynx: Oropharynx is clear.   Eyes:      General: No scleral icterus.     Extraocular Movements: Extraocular movements intact.      Conjunctiva/sclera: Conjunctivae normal.      Pupils: Pupils are equal, round, and reactive to light.   Cardiovascular:      Rate and Rhythm: Normal rate and regular rhythm.      Pulses: Normal pulses.      Heart sounds: Normal heart sounds. No murmur heard.     No gallop.   Pulmonary:      Effort: Pulmonary effort is normal. No respiratory distress.      Breath sounds: Normal breath sounds. No wheezing, rhonchi or rales.   Chest:      Chest wall: No tenderness.   Abdominal:      General: Abdomen is flat. There is no distension.      Palpations: Abdomen is soft.      Tenderness: There is no abdominal tenderness.   Musculoskeletal:         General: No swelling or tenderness.      Cervical back: Normal range of motion and neck supple. No rigidity or tenderness.      Right lower leg: No edema.      Left lower leg: No edema.    Skin:     General: Skin is warm and dry.   Neurological:      General: No focal deficit present.      Mental Status: He is alert and oriented to person, place, and time. Mental status is at baseline.      Motor: No weakness.   Psychiatric:         Mood and Affect: Mood normal.         Behavior: Behavior normal.         Thought Content: Thought content normal.             Significant Labs: All pertinent labs within the past 24 hours have been reviewed.  BMP:   Recent Labs   Lab 03/31/24  0736      *   K 3.8      CO2 25   BUN 14   CREATININE 2.1*   CALCIUM 8.7   MG 2.1       CBC:   Recent Labs   Lab 03/30/24 0452 03/31/24  0736   WBC 6.64 7.42   HGB 10.6* 10.6*   HCT 32.2* 31.7*   PLT 72* 75*       CMP:   Recent Labs   Lab 03/30/24 0452 03/31/24  0736    134*   K 4.0 3.8    103   CO2 25 25   * 108   BUN 15 14   CREATININE 1.9* 2.1*   CALCIUM 8.5* 8.7   PROT 7.4 7.6   ALBUMIN 3.3* 3.4*   BILITOT 3.1* 3.5*   ALKPHOS 80 84   AST 35 33   ALT 17 16   ANIONGAP 7* 6*         Significant Imaging: I have reviewed all pertinent imaging results/findings within the past 24 hours.    Assessment/Plan:      * Gastrointestinal hemorrhage  EGD with small nonbleeding varices  Bleeding thought to be from epistaxis, not GI source      Ventricular tachycardia  EKG today with sinus bradycardia  Cardiology following      HTN (hypertension)  Chronic, controlled. Latest blood pressure and vitals reviewed-     Temp:  [97.9 °F (36.6 °C)-98.8 °F (37.1 °C)]   Pulse:  [56-69]   Resp:  [17-19]   BP: (139-160)/(73-92)   SpO2:  [95 %-99 %] .   Home meds for hypertension were reviewed and noted below.   Hypertension Medications               amLODIPine (NORVASC) 10 MG tablet Take 1 tablet (10 mg total) by mouth once daily.    carvediloL (COREG) 12.5 MG tablet Take 1 tablet (12.5 mg total) by mouth 2 (two) times daily with meals.    furosemide (LASIX) 20 MG tablet Take 1 tablet (20 mg total) by mouth once  daily.    spironolactone (ALDACTONE) 50 MG tablet Take 1 tablet (50 mg total) by mouth once daily.            While in the hospital, will manage blood pressure as follows; Adjust home antihypertensive regimen as follows- IV labetalol, holding home PO meds given varices and active hematemesis    Will utilize p.r.n. blood pressure medication only if patient's blood pressure greater than 180/110 and he develops symptoms such as worsening chest pain or shortness of breath.    Alcohol abuse  Banana bag  CIWA precautions  Ativan PRN withdrawal  Tele monitoring      CKD (chronic kidney disease) stage 3, GFR 30-59 ml/min  Creatine stable for now. BMP reviewed- noted Estimated Creatinine Clearance: 53.7 mL/min (A) (based on SCr of 2.1 mg/dL (H)). according to latest data. Based on current GFR, CKD stage is stage 3 - GFR 30-59.  Monitor UOP and serial BMP and adjust therapy as needed. Renally dose meds. Avoid nephrotoxic medications and procedures.    Alcoholic cirrhosis of liver  Patient with known Cirrhosis with Child's class B. Co-morbidities are present and inclusive of esophageal varices and anemia.  MELD-Na score calculated; MELD 3.0: 21 at 3/30/2024  4:52 AM  MELD-Na: 20 at 3/30/2024  4:52 AM  Calculated from:  Serum Creatinine: 1.9 mg/dL at 3/30/2024  4:52 AM  Serum Sodium: 136 mmol/L at 3/30/2024  4:52 AM  Total Bilirubin: 3.1 mg/dL at 3/30/2024  4:52 AM  Serum Albumin: 3.3 g/dL at 3/30/2024  4:52 AM  INR(ratio): 1.2 at 3/28/2024  6:07 AM  Age at listing (hypothetical): 48 years  Sex: Male at 3/30/2024  4:52 AM      Continue chronic meds. Etiology likely ETOH. Will avoid any hepatotoxic meds, and monitor CBC/CMP/INR for synthetic function.     Thrombocytopenia  Patient was found to have thrombocytopenia, the likely etiology is secondary to cirrhosis/portal hypertension, will monitor the platelets Daily. Will transfuse if platelet count is <10k. Hold DVT prophylaxis if platelets are <50k. The patient's platelet  results have been reviewed and are listed below.  Recent Labs   Lab 03/31/24  0736   PLT 75*             VTE Risk Mitigation (From admission, onward)           Ordered     Reason for No Pharmacological VTE Prophylaxis  Once        Question:  Reasons:  Answer:  Active Bleeding    03/27/24 1457     IP VTE HIGH RISK PATIENT  Once         03/27/24 1457     Place sequential compression device  Until discontinued         03/27/24 1457                    Discharge Planning   RADHA: 3/31/2024     Code Status: Full Code   Is the patient medically ready for discharge?:     Reason for patient still in hospital (select all that apply): Patient trending condition and Treatment  Discharge Plan A: Home Health                  Mechelle Toscano MD  Department of Hospital Medicine   [facility]

## 2024-03-31 NOTE — ASSESSMENT & PLAN NOTE
Chronic, controlled. Latest blood pressure and vitals reviewed-     Temp:  [97.9 °F (36.6 °C)-98.8 °F (37.1 °C)]   Pulse:  [56-69]   Resp:  [17-19]   BP: (139-160)/(73-92)   SpO2:  [95 %-99 %] .   Home meds for hypertension were reviewed and noted below.   Hypertension Medications               amLODIPine (NORVASC) 10 MG tablet Take 1 tablet (10 mg total) by mouth once daily.    carvediloL (COREG) 12.5 MG tablet Take 1 tablet (12.5 mg total) by mouth 2 (two) times daily with meals.    furosemide (LASIX) 20 MG tablet Take 1 tablet (20 mg total) by mouth once daily.    spironolactone (ALDACTONE) 50 MG tablet Take 1 tablet (50 mg total) by mouth once daily.            While in the hospital, will manage blood pressure as follows; Adjust home antihypertensive regimen as follows- IV labetalol, holding home PO meds given varices and active hematemesis    Will utilize p.r.n. blood pressure medication only if patient's blood pressure greater than 180/110 and he develops symptoms such as worsening chest pain or shortness of breath.

## 2024-03-31 NOTE — ASSESSMENT & PLAN NOTE
Patient was found to have thrombocytopenia, the likely etiology is secondary to cirrhosis/portal hypertension, will monitor the platelets Daily. Will transfuse if platelet count is <10k. Hold DVT prophylaxis if platelets are <50k. The patient's platelet results have been reviewed and are listed below.  Recent Labs   Lab 03/31/24  0736   PLT 75*

## 2024-04-01 ENCOUNTER — TELEPHONE (OUTPATIENT)
Dept: CARDIOLOGY | Facility: CLINIC | Age: 49
End: 2024-04-01
Payer: MEDICARE

## 2024-04-01 VITALS
BODY MASS INDEX: 31.83 KG/M2 | OXYGEN SATURATION: 98 % | RESPIRATION RATE: 18 BRPM | SYSTOLIC BLOOD PRESSURE: 155 MMHG | WEIGHT: 235 LBS | TEMPERATURE: 98 F | DIASTOLIC BLOOD PRESSURE: 85 MMHG | HEIGHT: 72 IN | HEART RATE: 56 BPM

## 2024-04-01 LAB
ALBUMIN SERPL BCP-MCNC: 3.6 G/DL (ref 3.5–5.2)
ALP SERPL-CCNC: 97 U/L (ref 55–135)
ALT SERPL W/O P-5'-P-CCNC: 15 U/L (ref 10–44)
ANION GAP SERPL CALC-SCNC: 9 MMOL/L (ref 8–16)
AST SERPL-CCNC: 34 U/L (ref 10–40)
BASOPHILS # BLD AUTO: 0.03 K/UL (ref 0–0.2)
BASOPHILS NFR BLD: 0.3 % (ref 0–1.9)
BILIRUB SERPL-MCNC: 3.7 MG/DL (ref 0.1–1)
BUN SERPL-MCNC: 13 MG/DL (ref 6–20)
CALCIUM SERPL-MCNC: 9.3 MG/DL (ref 8.7–10.5)
CHLORIDE SERPL-SCNC: 104 MMOL/L (ref 95–110)
CO2 SERPL-SCNC: 24 MMOL/L (ref 23–29)
CREAT SERPL-MCNC: 1.9 MG/DL (ref 0.5–1.4)
DIFFERENTIAL METHOD BLD: ABNORMAL
EOSINOPHIL # BLD AUTO: 0.2 K/UL (ref 0–0.5)
EOSINOPHIL NFR BLD: 1.7 % (ref 0–8)
ERYTHROCYTE [DISTWIDTH] IN BLOOD BY AUTOMATED COUNT: 17.6 % (ref 11.5–14.5)
EST. GFR  (NO RACE VARIABLE): 42.7 ML/MIN/1.73 M^2
GLUCOSE SERPL-MCNC: 123 MG/DL (ref 70–110)
HCT VFR BLD AUTO: 33.9 % (ref 40–54)
HGB BLD-MCNC: 11.3 G/DL (ref 14–18)
IMM GRANULOCYTES # BLD AUTO: 0.02 K/UL (ref 0–0.04)
IMM GRANULOCYTES NFR BLD AUTO: 0.2 % (ref 0–0.5)
LYMPHOCYTES # BLD AUTO: 2.5 K/UL (ref 1–4.8)
LYMPHOCYTES NFR BLD: 26.1 % (ref 18–48)
MAGNESIUM SERPL-MCNC: 2.2 MG/DL (ref 1.6–2.6)
MCH RBC QN AUTO: 36.2 PG (ref 27–31)
MCHC RBC AUTO-ENTMCNC: 33.3 G/DL (ref 32–36)
MCV RBC AUTO: 109 FL (ref 82–98)
MONOCYTES # BLD AUTO: 1.2 K/UL (ref 0.3–1)
MONOCYTES NFR BLD: 12.1 % (ref 4–15)
NEUTROPHILS # BLD AUTO: 5.7 K/UL (ref 1.8–7.7)
NEUTROPHILS NFR BLD: 59.6 % (ref 38–73)
NRBC BLD-RTO: 0 /100 WBC
PLATELET # BLD AUTO: 85 K/UL (ref 150–450)
PMV BLD AUTO: 10.5 FL (ref 9.2–12.9)
POTASSIUM SERPL-SCNC: 4.5 MMOL/L (ref 3.5–5.1)
PROT SERPL-MCNC: 8.1 G/DL (ref 6–8.4)
RBC # BLD AUTO: 3.12 M/UL (ref 4.6–6.2)
SODIUM SERPL-SCNC: 137 MMOL/L (ref 136–145)
WBC # BLD AUTO: 9.5 K/UL (ref 3.9–12.7)

## 2024-04-01 PROCEDURE — 80053 COMPREHEN METABOLIC PANEL: CPT

## 2024-04-01 PROCEDURE — 63600175 PHARM REV CODE 636 W HCPCS

## 2024-04-01 PROCEDURE — 85025 COMPLETE CBC W/AUTO DIFF WBC: CPT

## 2024-04-01 PROCEDURE — 25000003 PHARM REV CODE 250: Performed by: HOSPITALIST

## 2024-04-01 PROCEDURE — 36415 COLL VENOUS BLD VENIPUNCTURE: CPT

## 2024-04-01 PROCEDURE — 83735 ASSAY OF MAGNESIUM: CPT

## 2024-04-01 RX ORDER — METOPROLOL TARTRATE 50 MG/1
50 TABLET ORAL 2 TIMES DAILY
Qty: 60 TABLET | Refills: 11 | Status: SHIPPED | OUTPATIENT
Start: 2024-04-01 | End: 2025-04-01

## 2024-04-01 RX ORDER — AMLODIPINE BESYLATE 5 MG/1
10 TABLET ORAL DAILY
Status: DISCONTINUED | OUTPATIENT
Start: 2024-04-01 | End: 2024-04-01 | Stop reason: HOSPADM

## 2024-04-01 RX ORDER — OXYCODONE AND ACETAMINOPHEN 7.5; 325 MG/1; MG/1
1 TABLET ORAL ONCE
Status: COMPLETED | OUTPATIENT
Start: 2024-04-01 | End: 2024-04-01

## 2024-04-01 RX ORDER — DOXYCYCLINE 100 MG/1
100 CAPSULE ORAL 2 TIMES DAILY
Qty: 10 CAPSULE | Refills: 0 | Status: SHIPPED | OUTPATIENT
Start: 2024-04-01 | End: 2024-04-06

## 2024-04-01 RX ADMIN — AMLODIPINE BESYLATE 10 MG: 5 TABLET ORAL at 08:04

## 2024-04-01 RX ADMIN — PANTOPRAZOLE SODIUM 40 MG: 40 TABLET, DELAYED RELEASE ORAL at 08:04

## 2024-04-01 RX ADMIN — HYDROMORPHONE HYDROCHLORIDE 1 MG: 1 INJECTION, SOLUTION INTRAMUSCULAR; INTRAVENOUS; SUBCUTANEOUS at 06:04

## 2024-04-01 RX ADMIN — HYDROMORPHONE HYDROCHLORIDE 1 MG: 1 INJECTION, SOLUTION INTRAMUSCULAR; INTRAVENOUS; SUBCUTANEOUS at 12:04

## 2024-04-01 RX ADMIN — LACTULOSE 20 G: 20 SOLUTION ORAL at 08:04

## 2024-04-01 RX ADMIN — OXYCODONE HYDROCHLORIDE AND ACETAMINOPHEN 1 TABLET: 7.5; 325 TABLET ORAL at 01:04

## 2024-04-01 NOTE — PROGRESS NOTES
Patient cleared for dc by cm to home with no needs once seen by cardiology.     PCP hospital follow up appt scheduled for 4/5/24 at 9:20 am; added to AVS    Sent in basket message to cardiology to contact patient for appointment.    ENT referral placed. ENT to contact patient with appointment.    04/01/24 1143   Final Note   Assessment Type Final Discharge Note   Anticipated Discharge Disposition Home   Hospital Resources/Appts/Education Provided Appointments scheduled and added to AVS

## 2024-04-01 NOTE — TELEPHONE ENCOUNTER
----- Message from Priscilla Silva RN sent at 4/1/2024 11:34 AM CDT -----  Regarding: hospital follow up appointment  Patient discharging today, please contact with hospital follow up.

## 2024-04-02 LAB
OHS QRS DURATION: 92 MS
OHS QTC CALCULATION: 463 MS

## 2024-04-02 NOTE — DISCHARGE SUMMARY
[facility]  Discharge Summary      Patient Name: Irvin Diaz Jr.  MRN: [id number]  SOLEDAD: [id number]  Patient Class: IP- Inpatient  Admission Date: 3/27/2024  Hospital Length of Stay: 5 days  Discharge Date and Time: 4/1/2024  5:30 PM  Attending Physician: No att. providers found   Discharging Provider: Mechelle Toscano MD  Primary Care Provider: Edouard Gibson MD    Primary Care Team: Networked reference to record PCT     HPI:   48-year-old male presented to ED for eval of bleeding. Patient reported he has been having intermittent epistaxis since yesterday. Patient also reported today he had hematemesis and tarry stools. Patient has hx of alcoholic cirrhosis. Patient reported he had not had a drink for about 2 months prior to 2 days ago when he did consume alcohol. Patient has had episodes similar to this in the past and has required transfusions. Denies taking blood thinners or NSAIDs. Denied abd pain. Denied fever. Hgb & hct stable. Noted with thrombocytopenia, appears to be around baseline. Noted to have bright red bloody emesis to emesis bag on exam, epistaxis has ceased. Heme occult positive in ED. GI consulted in ED. Admit to hospital medicine for further eval.     Procedure(s) (LRB):  EGD (ESOPHAGOGASTRODUODENOSCOPY) (N/A)      Hospital Course:   The patient was admitted and observed for any bleeding, persistent vomiting or change in blood pressure.  He was continued on octreotide and ppi. GI was consulted and planned an EGD. The patient has chronic thrombocytopenia, and this was monitored closely, with the plan to transfuse platelets for count less than 10,000 and stop dvt prophylaxis for count less then 50,000. EGD was done and revealed no acute findings. The patient had a 15-beat run of V-tach on 03/29. EKG was ordered and revealed no change from previous EKG's, with prolonged QT interval. Echo and cardiology consult were ordered. Patient placed on lopressor.  And  did not have further episodes V-tach.  He was cleared for discharge by Cardiology.  Patient was discharged home with referral to ENT for epistaxis.  He will also follow up with PCP and Cardiology.   Patient noted to have S aureus UTI during his stay.  Treated with ceftriaxone while admitted and will complete a course of doxycycline on discharge.      Goals of Care Treatment Preferences:  Code Status: Full Code      Consults:   Consults (From admission, onward)          Status Ordering Provider     Inpatient consult to Cardiology  Once        Provider:  Carl Abreu MD    Completed CHRISTOPHE MANSFIELD     Inpatient consult to Gastroenterology  Once        Provider:  Renea Billingsley MD    Completed BHAKTI WHITLEY new Assessment & Plan notes have been filed under this hospital service since the last note was generated.  Service: Hospital Medicine    Final Active Diagnoses:    Diagnosis Date Noted POA    PRINCIPAL PROBLEM:  Gastrointestinal hemorrhage [K92.2] 03/27/2024 Yes    Ventricular tachycardia [I47.20] 03/29/2024 No    HTN (hypertension) [I10] 03/27/2024 Yes    Alcohol abuse [F10.10] 02/16/2021 Yes    CKD (chronic kidney disease) stage 3, GFR 30-59 ml/min [N18.30] 04/08/2020 Yes     Chronic    Alcoholic cirrhosis of liver [K70.30]  Yes     Chronic    Thrombocytopenia [D69.6] 05/11/2019 Yes     Chronic      Problems Resolved During this Admission:       Discharged Condition: good    Disposition: Home or Self Care    Follow Up:   Follow-up Information       Edouard Gibson MD Follow up on 4/5/2024.    Specialties: Family Medicine, Hospitalist  Why: @ 09:20 am  Contact information:  [address] [place] [address]  [phone]               Elver Gamble MD Follow up.    Specialties: Cardiovascular Disease, Cardiology  Contact information:  [address]  [address]  [facility] [address]  [phone]                           Patient Instructions:      Ambulatory  referral/consult to ENT   Standing Status: Future   Referral Priority: Routine Referral Type: Consultation   Referral Reason: Specialty Services Required   Requested Specialty: Otolaryngology   Number of Visits Requested: 1     Notify your health care provider if you experience any of the following:  temperature >100.4     Notify your health care provider if you experience any of the following:  persistent nausea and vomiting or diarrhea     Notify your health care provider if you experience any of the following:  redness, tenderness, or signs of infection (pain, swelling, redness, odor or green/yellow discharge around incision site)     Notify your health care provider if you experience any of the following:  persistent dizziness, light-headedness, or visual disturbances     Notify your health care provider if you experience any of the following:  increased confusion or weakness     Cardiac Monitor - 3-15 Day Adult (Cupid Only)   Standing Status: Future Standing Exp. Date: 04/01/25   Order Comments: Results to Dr. Elver Gamble     Order Specific Question Answer Comments   Duration: 7 days    Release to patient Immediate      Activity as tolerated       Significant Diagnostic Studies: Labs: BMP:   Recent Labs   Lab 04/01/24  0614   *      K 4.5      CO2 24   BUN 13   CREATININE 1.9*   CALCIUM 9.3   MG 2.2    and CBC   Recent Labs   Lab 04/01/24  0614   WBC 9.50   HGB 11.3*   HCT 33.9*   PLT 85*     Microbiology Results (Last 365 Days)    Procedure Component Value Units Date/Time   Urine culture [[id number]] (Abnormal)  Collected: 03/27/24 6002   Order Status: Completed Specimen: Urine Updated: 03/30/24 0745    Urine Culture, Routine STAPHYLOCOCCUS AUREUS  >100,000cfu/ml   Abnormal    Narrative:     Specimen Source->Urine   Susceptibility     Staphylococcus aureus     CULTURE, URINE     Ceftriaxone <=8 mcg/mL Sensitive     Oxacillin <=0.25 mcg/mL Sensitive     Penicillin <=0.03 mcg/mL Sensitive      Tetracycline <=4 mcg/mL Sensitive     Trimeth/Sulfa <=0.5/9.5 m... Sensitive     Vancomycin 1 mcg/mL Sensitive               Linear View           Pending Diagnostic Studies:       Procedure Component Value Units Date/Time    EKG 12-lead [[id number]] Collected: 03/31/24 0848    Order Status: Sent Lab Status: In process Updated: 03/31/24 1400     QRS Duration 92 ms      [facility] QTC Calculation 463 ms     Narrative:      Test Reason : R07.9,    Vent. Rate : 053 BPM     Atrial Rate : 053 BPM     P-R Int : 148 ms          QRS Dur : 092 ms      QT Int : 494 ms       P-R-T Axes : 062 061 064 degrees     QTc Int : 463 ms    Sinus bradycardia  Otherwise normal ECG  No previous ECGs available    Referred By: JULIAN   SELF           Confirmed By:     EKG 12-lead [[id number]]     Order Status: Sent Lab Status: No result            Medications:  Reconciled Home Medications:      Medication List        START taking these medications      doxycycline 100 MG Cap  Commonly known as: VIBRAMYCIN  Take 1 capsule (100 mg total) by mouth 2 (two) times daily. for 5 days     metoprolol tartrate 50 MG tablet  Commonly known as: LOPRESSOR  Take 1 tablet (50 mg total) by mouth 2 (two) times daily. Do not take if heart rate is less than 60            CHANGE how you take these medications      clindamycin-benzoyl peroxide gel  Commonly known as: BENZACLIN  Apply topically 2 (two) times daily.  What changed: how much to take            CONTINUE taking these medications      acamprosate 333 mg tablet  Commonly known as: CAMPRAL  Take 2 tablets (666 mg total) by mouth 3 (three) times daily.     amLODIPine 10 MG tablet  Commonly known as: NORVASC  Take 1 tablet (10 mg total) by mouth once daily.     augmented betamethasone dipropionate 0.05 % cream  Commonly known as: DIPROLENE-AF  Apply topically 2 (two) times daily.     calcitRIOL 0.25 MCG Cap  Commonly known as: ROCALTROL  Take 1 capsule (0.25 mcg total) by mouth once daily.     FERATE 240  (27 FE) MG tablet  Generic drug: ferrous gluconate  Take 2 tablets (480 mg total) by mouth 2 (two) times daily with meals.     folic acid 1 MG tablet  Commonly known as: FOLVITE  Take 1 tablet (1 mg total) by mouth once daily.     furosemide 20 MG tablet  Commonly known as: LASIX  Take 1 tablet (20 mg total) by mouth once daily.     hydrOXYzine HCL 25 MG tablet  Commonly known as: ATARAX  Take 25 mg by mouth nightly as needed for Itching.     pantoprazole 40 MG tablet  Commonly known as: PROTONIX  Take 2 tablets (80 mg total) by mouth 2 (two) times daily.     sildenafiL 100 MG tablet  Commonly known as: VIAGRA  Take 1 tablet (100 mg total) by mouth daily as needed for Erectile Dysfunction.     spironolactone 50 MG tablet  Commonly known as: ALDACTONE  Take 1 tablet (50 mg total) by mouth once daily.     thiamine 100 MG tablet  Take 1 tablet (100 mg total) by mouth once daily.     traMADoL 50 mg tablet  Commonly known as: ULTRAM  Take 1 tablet (50 mg total) by mouth 4 (four) times daily as needed for Pain.     XIFAXAN 550 mg Tab  Generic drug: rifAXIMin  Take 1 tablet (550 mg total) by mouth 2 (two) times daily.            STOP taking these medications      carvediloL 12.5 MG tablet  Commonly known as: COREG              Indwelling Lines/Drains at time of discharge:   Lines/Drains/Airways       Airway  Duration                  Airway - Non-Surgical 03/28/24 1137 Other (Comment) 5 days                    Time spent on the discharge of patient: 35 minutes         Mechelle Toscano MD  Department of Hospital Medicine  [facility]

## 2024-04-05 ENCOUNTER — OFFICE VISIT (OUTPATIENT)
Dept: PRIMARY CARE CLINIC | Facility: CLINIC | Age: 49
End: 2024-04-05
Payer: MEDICARE

## 2024-04-05 VITALS
HEART RATE: 86 BPM | RESPIRATION RATE: 19 BRPM | SYSTOLIC BLOOD PRESSURE: 136 MMHG | OXYGEN SATURATION: 99 % | WEIGHT: 228.75 LBS | DIASTOLIC BLOOD PRESSURE: 66 MMHG | HEIGHT: 72 IN | BODY MASS INDEX: 30.98 KG/M2

## 2024-04-05 DIAGNOSIS — Z23 NEED FOR VACCINATION: Primary | ICD-10-CM

## 2024-04-05 DIAGNOSIS — N52.9 ERECTILE DYSFUNCTION, UNSPECIFIED ERECTILE DYSFUNCTION TYPE: ICD-10-CM

## 2024-04-05 DIAGNOSIS — K70.30 ALCOHOLIC CIRRHOSIS OF LIVER WITHOUT ASCITES: ICD-10-CM

## 2024-04-05 DIAGNOSIS — F10.10 ALCOHOL ABUSE: ICD-10-CM

## 2024-04-05 DIAGNOSIS — I50.9 CONGESTIVE HEART FAILURE, UNSPECIFIED HF CHRONICITY, UNSPECIFIED HEART FAILURE TYPE: ICD-10-CM

## 2024-04-05 DIAGNOSIS — I85.10 SECONDARY ESOPHAGEAL VARICES WITHOUT BLEEDING: ICD-10-CM

## 2024-04-05 DIAGNOSIS — D69.6 THROMBOCYTOPENIA: ICD-10-CM

## 2024-04-05 DIAGNOSIS — N18.32 STAGE 3B CHRONIC KIDNEY DISEASE: ICD-10-CM

## 2024-04-05 DIAGNOSIS — F10.21 ALCOHOL USE DISORDER, SEVERE, IN EARLY REMISSION: ICD-10-CM

## 2024-04-05 PROBLEM — E72.20 HYPERAMMONEMIA: Status: ACTIVE | Noted: 2024-04-05

## 2024-04-05 PROCEDURE — 99999PBSHW FLU VACCINE (QUAD) GREATER THAN OR EQUAL TO 3YO PRESERVATIVE FREE IM: Mod: PBBFAC,NTX,,

## 2024-04-05 PROCEDURE — G0008 ADMIN INFLUENZA VIRUS VAC: HCPCS | Mod: PBBFAC,PN,NTX

## 2024-04-05 PROCEDURE — 99215 OFFICE O/P EST HI 40 MIN: CPT | Mod: PBBFAC,PN,NTX,25 | Performed by: STUDENT IN AN ORGANIZED HEALTH CARE EDUCATION/TRAINING PROGRAM

## 2024-04-05 PROCEDURE — 99214 OFFICE O/P EST MOD 30 MIN: CPT | Mod: S$PBB,NTX,, | Performed by: STUDENT IN AN ORGANIZED HEALTH CARE EDUCATION/TRAINING PROGRAM

## 2024-04-05 PROCEDURE — 99999 PR PBB SHADOW E&M-EST. PATIENT-LVL V: CPT | Mod: PBBFAC,TXP,, | Performed by: STUDENT IN AN ORGANIZED HEALTH CARE EDUCATION/TRAINING PROGRAM

## 2024-04-05 NOTE — PROGRESS NOTES
Subjective:       Patient ID: Irvin Diaz Jr. is a 48 y.o. male.    Chief Complaint: Hospital Follow Up (Nose bleed)    HPI:  48 y.o. male presents to [facility] with complaints of    Patient was admitted for epistaxis 3/27/2024-4/1/2024. Patient with history of alcoholic cirrhosis. Placed on octreotide whie impatient. Bleeding was recurrent.    Patient reports had stopped drinking for a long time. Had drank 1 day prior to recurrent nose bleed.    Follows with hematology?:  Hasn't in past. Bleedign has been recurrent  Patient has history of CHF and will see Cardiology in 1 week    Patient is on viagra without good response and would like to speak with Urology.    Review of Systems   Constitutional:  Negative for chills, diaphoresis, fatigue and fever.   HENT:  Negative for congestion, sinus pressure, sneezing and sore throat.    Respiratory:  Negative for cough and shortness of breath.    Cardiovascular:  Negative for chest pain and palpitations.   Gastrointestinal:  Negative for abdominal pain, diarrhea, nausea and vomiting.   Skin:  Positive for rash (On ankles, psoriasis). Negative for wound.   Neurological:  Negative for dizziness and weakness.   Hematological:  Negative for adenopathy. Bruises/bleeds easily.       Objective:      Vitals:    04/05/24 0905   BP: (!) 144/68   BP Location: Left arm   Patient Position: Sitting   BP Method: Medium (Manual)   Pulse: 86   Resp: 19   SpO2: 99%   Weight: 103.8 kg (228 lb 11.6 oz)   Height: 6' (1.829 m)     Physical Exam  Vitals reviewed.   Constitutional:       General: He is not in acute distress.     Appearance: Normal appearance. He is not ill-appearing.   HENT:      Head: Normocephalic and atraumatic.   Eyes:      General:         Right eye: No discharge.         Left eye: No discharge.      Conjunctiva/sclera: Conjunctivae normal.   Cardiovascular:      Rate and Rhythm: Normal rate and regular rhythm.      Heart sounds: Normal heart sounds. No murmur  heard.  Pulmonary:      Effort: Pulmonary effort is normal.      Breath sounds: Normal breath sounds.   Musculoskeletal:         General: No deformity.   Skin:     General: Skin is warm and dry.      Coloration: Skin is not jaundiced.   Neurological:      General: No focal deficit present.      Mental Status: He is alert and oriented to person, place, and time.   Psychiatric:         Mood and Affect: Mood normal.         Behavior: Behavior normal.             Lab Results   Component Value Date     04/01/2024     09/02/2018    K 4.5 04/01/2024     04/01/2024     09/02/2018    CO2 24 04/01/2024    BUN 13 04/01/2024    CREATININE 1.9 (H) 04/01/2024    CREATININE 0.91 09/02/2018    GLUCOSE 103 (H) 03/30/2021    ANIONGAP 9 04/01/2024     Lab Results   Component Value Date    HGBA1C 5.7 11/12/2020     Lab Results   Component Value Date    BNP 48 04/28/2023     (H) 07/18/2022     (H) 03/07/2022       Lab Results   Component Value Date    WBC 9.50 04/01/2024    HGB 11.3 (L) 04/01/2024    HCT 33.9 (L) 04/01/2024    HCT 25 (L) 11/01/2022    PLT 85 (L) 04/01/2024    GRAN 5.7 04/01/2024    GRAN 59.6 04/01/2024     Lab Results   Component Value Date    CHOL 146 08/15/2022    HDL 30 (L) 08/15/2022    LDLCALC 98.0 08/15/2022    TRIG 90 08/15/2022          Current Outpatient Medications:     amLODIPine (NORVASC) 10 MG tablet, Take 1 tablet (10 mg total) by mouth once daily., Disp: 30 tablet, Rfl: 11    augmented betamethasone dipropionate (DIPROLENE-AF) 0.05 % cream, Apply topically 2 (two) times daily. (Patient taking differently: Apply 1 application  topically 2 (two) times daily.), Disp: 50 g, Rfl: 1    calcitRIOL (ROCALTROL) 0.25 MCG Cap, Take 1 capsule (0.25 mcg total) by mouth once daily., Disp: 30 capsule, Rfl: 1    clindamycin-benzoyl peroxide (BENZACLIN) gel, Apply topically 2 (two) times daily. (Patient taking differently: Apply 1 application  topically 2 (two) times daily.), Disp: 50  g, Rfl: 1    doxycycline (VIBRAMYCIN) 100 MG Cap, Take 1 capsule (100 mg total) by mouth 2 (two) times daily. for 5 days, Disp: 10 capsule, Rfl: 0    ferrous gluconate (FERGON) 240 (27 FE) MG tablet, Take 2 tablets (480 mg total) by mouth 2 (two) times daily with meals., Disp: 120 tablet, Rfl: 3    folic acid (FOLVITE) 1 MG tablet, Take 1 tablet (1 mg total) by mouth once daily., Disp: 30 tablet, Rfl: 5    furosemide (LASIX) 20 MG tablet, Take 1 tablet (20 mg total) by mouth once daily., Disp: 30 tablet, Rfl: 11    hydrOXYzine HCL (ATARAX) 25 MG tablet, Take 25 mg by mouth nightly as needed for Itching., Disp: , Rfl:     metoprolol tartrate (LOPRESSOR) 50 MG tablet, Take 1 tablet (50 mg total) by mouth 2 (two) times daily. Do not take if heart rate is less than 60, Disp: 60 tablet, Rfl: 11    pantoprazole (PROTONIX) 40 MG tablet, Take 2 tablets (80 mg total) by mouth 2 (two) times daily., Disp: 60 tablet, Rfl: 11    rifAXIMin (XIFAXAN) 550 mg Tab, Take 1 tablet (550 mg total) by mouth 2 (two) times daily., Disp: 60 tablet, Rfl: 11    sildenafiL (VIAGRA) 100 MG tablet, Take 1 tablet (100 mg total) by mouth daily as needed for Erectile Dysfunction., Disp: 30 tablet, Rfl: 5    spironolactone (ALDACTONE) 50 MG tablet, Take 1 tablet (50 mg total) by mouth once daily., Disp: 30 tablet, Rfl: 11    thiamine 100 MG tablet, Take 1 tablet (100 mg total) by mouth once daily., Disp: 30 tablet, Rfl: 5    traMADoL (ULTRAM) 50 mg tablet, Take 1 tablet (50 mg total) by mouth 4 (four) times daily as needed for Pain., Disp: 120 each, Rfl: 2    acamprosate (CAMPRAL) 333 mg tablet, Take 2 tablets (666 mg total) by mouth 3 (three) times daily., Disp: 180 tablet, Rfl: 11        Assessment:       1. Need for vaccination    2. Alcoholic cirrhosis of liver without ascites    3. Congestive heart failure, unspecified HF chronicity, unspecified heart failure type    4. Secondary esophageal varices without bleeding    5. Alcohol use disorder,  severe, in early remission    6. Stage 3b chronic kidney disease    7. Erectile dysfunction, unspecified erectile dysfunction type           Plan:       Need for vaccination  -     Influenza - Quadrivalent *Preferred* (6 months+) (PF)    Alcoholic cirrhosis of liver without ascites  Secondary esophageal varices without bleeding  Alcohol use disorder, severe, in early remission  Stage 3b chronic kidney disease  Alcohol abuse  Thrombocytopenia  -     Ambulatory referral/consult to Hematology / Oncology; Future; Expected date: 04/12/2024  - Avoiding nephrotoxic agents  - Recommend avoid alcohol    Congestive heart failure, unspecified HF chronicity, unspecified heart failure type  -     Ambulatory referral/consult to Cardiology; Future; Expected date: 04/12/2024      Erectile dysfunction, unspecified erectile dysfunction type  -     Ambulatory referral/consult to Urology; Future; Expected date: 04/12/2024    RTC PRN

## 2024-04-08 ENCOUNTER — PATIENT OUTREACH (OUTPATIENT)
Dept: ADMINISTRATIVE | Facility: CLINIC | Age: 49
End: 2024-04-08
Payer: MEDICARE

## 2024-04-08 NOTE — PROGRESS NOTES
C3 nurse attempted to contact Irvin Diaz Jr. for a TCC post hospital discharge follow up call. No answer. Left voicemail with callback information. The patient has a scheduled HOSFU appointment with Edouard Gibson MD on 04/05/24 @ [phone] - completed.

## 2024-04-08 NOTE — PROGRESS NOTES
2nd Attempt made to reach patient for TCC call. Left voicemail please call [phone] leave first name, last name, and  Benita will return your call.

## 2024-04-09 ENCOUNTER — HOSPITAL ENCOUNTER (OUTPATIENT)
Dept: CARDIOLOGY | Facility: CLINIC | Age: 49
Discharge: HOME | End: 2024-04-09
Attending: HOSPITALIST
Payer: MEDICARE

## 2024-04-09 DIAGNOSIS — I47.20 VENTRICULAR TACHYCARDIA: ICD-10-CM

## 2024-04-09 DIAGNOSIS — I42.6 ALCOHOLIC CARDIOMYOPATHY: ICD-10-CM

## 2024-04-09 PROCEDURE — 93244 EXT ECG>48HR<7D REV&INTERPJ: CPT | Mod: NTX,,, | Performed by: INTERNAL MEDICINE

## 2024-04-09 NOTE — PROGRESS NOTES
3rd Attempt made to reach patient for TCC call. Left voicemail please call [phone] leave first name, last name, and  Benita will return your call.

## 2024-04-14 LAB
OHS QRS DURATION: 86 MS
OHS QTC CALCULATION: 488 MS

## 2024-04-15 LAB
OHS QRS DURATION: 88 MS
OHS QTC CALCULATION: 503 MS

## 2024-04-22 ENCOUNTER — OFFICE VISIT (OUTPATIENT)
Dept: UROLOGY | Facility: CLINIC | Age: 49
End: 2024-04-22
Payer: MEDICARE

## 2024-04-22 VITALS
HEART RATE: 96 BPM | SYSTOLIC BLOOD PRESSURE: 165 MMHG | DIASTOLIC BLOOD PRESSURE: 89 MMHG | BODY MASS INDEX: 31.1 KG/M2 | WEIGHT: 229.63 LBS | HEIGHT: 72 IN

## 2024-04-22 DIAGNOSIS — N52.9 ERECTILE DYSFUNCTION, UNSPECIFIED ERECTILE DYSFUNCTION TYPE: Primary | ICD-10-CM

## 2024-04-22 DIAGNOSIS — I51.89 DIASTOLIC DYSFUNCTION: ICD-10-CM

## 2024-04-22 DIAGNOSIS — F10.20 ALCOHOL USE DISORDER, SEVERE, DEPENDENCE: Chronic | ICD-10-CM

## 2024-04-22 PROCEDURE — 99213 OFFICE O/P EST LOW 20 MIN: CPT | Mod: PBBFAC,PN,NTX | Performed by: STUDENT IN AN ORGANIZED HEALTH CARE EDUCATION/TRAINING PROGRAM

## 2024-04-22 PROCEDURE — 99204 OFFICE O/P NEW MOD 45 MIN: CPT | Mod: S$PBB,NTX,, | Performed by: STUDENT IN AN ORGANIZED HEALTH CARE EDUCATION/TRAINING PROGRAM

## 2024-04-22 PROCEDURE — 99999 PR PBB SHADOW E&M-EST. PATIENT-LVL III: CPT | Mod: PBBFAC,TXP,, | Performed by: STUDENT IN AN ORGANIZED HEALTH CARE EDUCATION/TRAINING PROGRAM

## 2024-04-22 RX ORDER — TADALAFIL 20 MG/1
20 TABLET ORAL DAILY PRN
Qty: 15 TABLET | Refills: 11 | Status: SHIPPED | OUTPATIENT
Start: 2024-04-22 | End: 2024-04-22

## 2024-04-22 RX ORDER — TADALAFIL 20 MG/1
20 TABLET ORAL DAILY PRN
Qty: 15 TABLET | Refills: 11 | Status: SHIPPED | OUTPATIENT
Start: 2024-04-22 | End: 2025-04-22

## 2024-04-22 NOTE — PROGRESS NOTES
[facility] - Urology [facility] [address]   Clinic Note    SUBJECTIVE:     Chief Complaint: ED    History of Present Illness:  Irvin Diaz Jr. is a 48 y.o. male who presents to clinic for ED. He is new to our clinic referred by Dr. Edouard Gibson. Referral from Edouard Gibson MD.    He has had ED for 3-4 months years. He has tried Viagra without success but he has been taking this on a full stomach.   He has a history of CHF, alcoholic cirrhosis with esophageal varices, and thrombocytopenia.     Anticoagulation:  No    OBJECTIVE:     Estimated body mass index is 31.14 kg/m² as calculated from the following:    Height as of this encounter: 6' (1.829 m).    Weight as of this encounter: 104.2 kg (229 lb 9.8 oz).    Vital Signs (Most Recent)  Pulse: 96 (04/22/24 1019)  BP: (!) 165/89 (04/22/24 1019)    Physical Exam  Constitutional:       Appearance: He is obese.   HENT:      Head: Normocephalic and atraumatic.   Eyes:      Conjunctiva/sclera: Conjunctivae normal.   Cardiovascular:      Rate and Rhythm: Normal rate.   Abdominal:      General: Abdomen is flat.   Musculoskeletal:         General: Normal range of motion.   Skin:     General: Skin is warm and dry.   Neurological:      General: No focal deficit present.      Mental Status: He is alert and oriented to person, place, and time.   Psychiatric:         Mood and Affect: Mood normal.         Behavior: Behavior normal.         Thought Content: Thought content normal.         Judgment: Judgment normal.         Lab Results   Component Value Date    BUN 13 04/01/2024    CREATININE 1.9 (H) 04/01/2024    WBC 9.50 04/01/2024    HGB 11.3 (L) 04/01/2024    HCT 33.9 (L) 04/01/2024    PLT 85 (L) 04/01/2024    AST 34 04/01/2024    ALT 15 04/01/2024    ALKPHOS 97 04/01/2024    ALBUMIN 3.6 04/01/2024    HGBA1C 5.7 11/12/2020        Lab Results   Component Value Date    PSA 1.2 07/21/2022    PSA 1.2 10/27/2020         ASSESSMENT     1. Erectile dysfunction, unspecified  erectile dysfunction type      PLAN:   1. Erectile dysfunction, unspecified erectile dysfunction type  -     Ambulatory referral/consult to Urology    Other orders  -     Discontinue: tadalafiL (CIALIS) 20 MG Tab; Take 1 tablet (20 mg total) by mouth daily as needed (erectile dysfunction).  Dispense: 15 tablet; Refill: 11  -     tadalafiL (CIALIS) 20 MG Tab; Take 1 tablet (20 mg total) by mouth daily as needed (erectile dysfunction).  Dispense: 15 tablet; Refill: 11       Rx given for Cialis 20 mg PRN. Can take this or Viagra, but not both together. Instructed patient that Viagra must be taken on an empty stomach.   Recommend limiting or eliminating alcohol consumption.   RTC PRN if no improvement; can try ICI or ASTRID. Given comorbidities, patient would not be a candidate for penile prosthesis.   Given CHF, patient is not a candidate for testosterone replacement therapy (if his T were low); to maximize endogenous testosterone production, recommend good sleep hygiene, regular physical exercise, good diet, and limiting consumption of alcohol, marijuana, and opiates.     Nestor Ma MD     Letter to Edouard Gibson MD

## 2024-04-23 ENCOUNTER — OFFICE VISIT (OUTPATIENT)
Dept: HEMATOLOGY/ONCOLOGY | Facility: CLINIC | Age: 49
End: 2024-04-23
Payer: MEDICARE

## 2024-04-23 ENCOUNTER — PATIENT MESSAGE (OUTPATIENT)
Dept: HEMATOLOGY/ONCOLOGY | Facility: CLINIC | Age: 49
End: 2024-04-23

## 2024-04-23 DIAGNOSIS — D53.9 NUTRITIONAL ANEMIA: ICD-10-CM

## 2024-04-23 DIAGNOSIS — K70.30 ALCOHOLIC CIRRHOSIS OF LIVER WITHOUT ASCITES: ICD-10-CM

## 2024-04-23 PROCEDURE — 99204 OFFICE O/P NEW MOD 45 MIN: CPT | Mod: 95,,, | Performed by: INTERNAL MEDICINE

## 2024-04-23 NOTE — PROGRESS NOTES
The patient location is: home  Visit type: Virtual visit with synchronous audio and video  Face-to-face or time spent with patient on the encounter: 25 min  Total time spent on and for  this encounter which includes non face-to-face time preparing to see patient, review of tests, obtaining and or reviewing separately obtained records documenting clinical information in the electronic or other health records, independently interpreting results which is not separately reported ,and communicating results to the patient/family/caregiver and in care coordination and treatment planning/communicating with pharmacy for prescriptions/addressing social needs/arranging follow-up and or referrals : 25 min     Each patient I provide medical services by telemedicine is:  (1) informed of the relationship between the physician and patient and the respective role of any other health care provider with respect to management of the patient; and (2) notified that he or she may decline to receive medical services by telemedicine and may withdraw from such care at any time.  This is a video visit therefore some elements of the physical exam such as vital signs, heart sounds are breath sounds are not included and may be included if found in recent clinic notes of other providers assessing same patient. Any symptoms or signs that were visualized were stated by the patient may be included in this note.      Service Date:  4/23/24    Chief Complaint: thrombocytopenia and anemia    Irvin Diaz Jr. is a 48 y.o. male here with thrombocytopenia and anemia. Patient does have a cirrhosis from hx of ETOH abuse. Had a recent episode of epistaxis. Recently had an EGD for esophageal varices which was negative for bleeding. Had positive stool occult blood at that time. Denies ETOH use now. No complaints to me.    Review of Systems   Constitutional: Negative.  Negative for appetite change and unexpected weight change.   HENT: Negative.  Negative  for mouth sores.    Eyes: Negative.  Negative for visual disturbance.   Respiratory:  Positive for shortness of breath. Negative for cough.    Cardiovascular: Negative.  Negative for chest pain.   Gastrointestinal: Negative.  Negative for abdominal pain and diarrhea.   Endocrine: Negative.    Genitourinary:  Positive for frequency.   Musculoskeletal: Negative.  Negative for back pain.   Integumentary:  Positive for rash.   Neurological: Negative.  Negative for headaches.   Hematological: Negative.  Negative for adenopathy.   Psychiatric/Behavioral: Negative.  The patient is not nervous/anxious.         Current Outpatient Medications   Medication Instructions    acamprosate (CAMPRAL) 666 mg, Oral, 3 times daily    amLODIPine (NORVASC) 10 mg, Oral, Daily    augmented betamethasone dipropionate (DIPROLENE-AF) 0.05 % cream Topical (Top), 2 times daily    calcitRIOL (ROCALTROL) 0.25 mcg, Oral, Daily    clindamycin-benzoyl peroxide (BENZACLIN) gel Topical (Top), 2 times daily    FERATE 480 mg, Oral, 2 times daily with meals    folic acid (FOLVITE) 1 mg, Oral, Daily    furosemide (LASIX) 20 mg, Oral, Daily    hydrOXYzine HCL (ATARAX) 25 mg, Nightly PRN    metoprolol tartrate (LOPRESSOR) 50 mg, Oral, 2 times daily, Do not take if heart rate is less than 60    pantoprazole (PROTONIX) 80 mg, Oral, 2 times daily    sildenafiL (VIAGRA) 100 mg, Oral, Daily PRN    spironolactone (ALDACTONE) 50 mg, Oral, Daily    tadalafiL (CIALIS) 20 mg, Oral, Daily PRN    thiamine 100 mg, Oral, Daily    traMADoL (ULTRAM) 50 mg, Oral, 4 times daily PRN    XIFAXAN 550 mg, Oral, 2 times daily        Past Medical History:   Diagnosis Date    Alcohol withdrawal 5/1/2022    Alcoholic cirrhosis of liver     Alcoholic ketoacidosis 6/6/2021    Arthritis     ATN (acute tubular necrosis)     CHF (congestive heart failure)     Diverticulitis 01/2020    Esophageal varices     GERD (gastroesophageal reflux disease)     GI bleed     Hip arthritis     Left     Hypertension     Liver cirrhosis     Macrocytic anemia     Pulmonary embolism 08/2018    Unprovoked DVT.  Stop Coumadin due to GIB    Thrombocytopenia         Past Surgical History:   Procedure Laterality Date    ANKLE SURGERY      BLOCK, NERVE, PERIPHERAL Left 06/24/2022    Procedure: Left Hip femoral-obturator accessory nerve block;  Surgeon: Robbin De La Garza DO;  Location: [facility];  Service: Pain Management;  Laterality: Left;    COLON SURGERY  2007    COLONOSCOPY  09/05/2019    [facility]    COLONOSCOPY N/A 02/17/2021    Procedure: COLONOSCOPY;  Surgeon: Renea Billingsley MD;  Location: [facility];  Service: Endoscopy;  Laterality: N/A;    COLONOSCOPY N/A 2/13/2023    Procedure: COLONOSCOPY;  Surgeon: Rudy Orellana MD;  [facility]  Service: Endoscopy;  Laterality: N/A;  cirrhosis-labs done on 1/11/23  instr portal-GT  No answer for precall- [place]    COLONOSCOPY N/A 3/10/2023    Procedure: COLONOSCOPY;  Surgeon: Rudy Orellana MD;  Location[facility]  Service: Endoscopy;  Laterality: N/A;  inst via poral per pt request    COLONOSCOPY W/ BIOPSIES  02/17/2021    ESOPHAGOGASTRODUODENOSCOPY N/A 07/26/2019    Procedure: EGD (ESOPHAGOGASTRODUODENOSCOPY);  Surgeon: Brian Trivedi MD;  Location: [facility];  Service: Endoscopy;  Laterality: N/A;    ESOPHAGOGASTRODUODENOSCOPY N/A 04/08/2020    Procedure: EGD (ESOPHAGOGASTRODUODENOSCOPY);  Surgeon: Donn Acuna MD;  Location: [facility];  Service: Endoscopy;  Laterality: N/A;    ESOPHAGOGASTRODUODENOSCOPY N/A 01/03/2021    Procedure: EGD (ESOPHAGOGASTRODUODENOSCOPY)- coffee ground emesis, hx varices;  Surgeon: Steve Chairez MD;  Location: [facility];  Service: Endoscopy;  Laterality: N/A;    ESOPHAGOGASTRODUODENOSCOPY N/A 05/03/2021    Procedure: EGD (ESOPHAGOGASTRODUODENOSCOPY);  Surgeon: Jeanmarie Ngo MD;  Location: [facility];  Service: Endoscopy;  Laterality: N/A;    ESOPHAGOGASTRODUODENOSCOPY N/A 07/19/2021    Procedure:  ESOPHAGOGASTRODUODENOSCOPY (EGD);  Surgeon: Claudio Medeiros MD;  Location: [facility];  Service: Endoscopy;  Laterality: N/A;    ESOPHAGOGASTRODUODENOSCOPY  2021    ESOPHAGOGASTRODUODENOSCOPY N/A 2021    Procedure: EGD (ESOPHAGOGASTRODUODENOSCOPY);  Surgeon: Ajay Jackson MD;  Location: [facility];  Service: Endoscopy;  Laterality: N/A;    ESOPHAGOGASTRODUODENOSCOPY N/A 2022    Procedure: EGD (ESOPHAGOGASTRODUODENOSCOPY);  Surgeon: Brian Trivedi MD;  Location: [facility];  Service: Endoscopy;  Laterality: N/A;    ESOPHAGOGASTRODUODENOSCOPY N/A 2022    Procedure: EGD (ESOPHAGOGASTRODUODENOSCOPY);  Surgeon: Ajay Jackson MD;  Location: [facility];  Service: Endoscopy;  Laterality: N/A;    ESOPHAGOGASTRODUODENOSCOPY N/A 2022    Procedure: EGD (ESOPHAGOGASTRODUODENOSCOPY);  Surgeon: Edouard Maynard MD;  Location: [facility] ([address] [place] [address]);  Service: Endoscopy;  Laterality: N/A;    ESOPHAGOGASTRODUODENOSCOPY N/A 2023    Procedure: EGD (ESOPHAGOGASTRODUODENOSCOPY);  Surgeon: Caesar Dickens MD;  Location: [facility];  Service: Endoscopy;  Laterality: N/A;    ESOPHAGOGASTRODUODENOSCOPY N/A 3/28/2024    Procedure: EGD (ESOPHAGOGASTRODUODENOSCOPY);  Surgeon: Jeanmarie Ngo MD;  Location: [facility];  Service: Endoscopy;  Laterality: N/A;    HERNIA REPAIR Left     Inguinal    INJECTION OF JOINT Right 2023    Procedure: RT Hip Injection;  Surgeon: Robbin De La Garza DO;  Location: [facility];  Service: Pain Management;  Laterality: Right;  oral - 20 mins    UPPER GASTROINTESTINAL ENDOSCOPY N/A 2022        Family History   Problem Relation Name Age of Onset    Hypertension Mother      Breast cancer Mother      Hypertension Father      Prostate cancer Father      Bladder Cancer Father         Social History     Tobacco Use    Smoking status: Former     Current packs/day: 0.00     Types: Cigarettes     Quit date:      Years since quittin.3     Smokeless tobacco: Never    Tobacco comments:     quit 20 years ago   Substance Use Topics    Alcohol use: Not Currently     Comment: Quit drinking 9/12/22    Drug use: Not Currently         There were no vitals filed for this visit.     Physical Exam:  There were no vitals taken for this visit.    Physical Exam  Vitals and nursing note reviewed.   Constitutional:       Appearance: Normal appearance.   HENT:      Head: Normocephalic and atraumatic.      Nose: Nose normal.      Mouth/Throat:      Mouth: Mucous membranes are moist.      Pharynx: Oropharynx is clear.   Eyes:      Extraocular Movements: Extraocular movements intact.      Conjunctiva/sclera: Conjunctivae normal.   Cardiovascular:      Rate and Rhythm: Normal rate and regular rhythm.      Heart sounds: Normal heart sounds.   Pulmonary:      Effort: Pulmonary effort is normal.      Breath sounds: Normal breath sounds.   Abdominal:      General: Abdomen is flat. Bowel sounds are normal.      Palpations: Abdomen is soft.   Musculoskeletal:         General: Normal range of motion.      Cervical back: Normal range of motion and neck supple.   Skin:     General: Skin is warm and dry.   Neurological:      General: No focal deficit present.      Mental Status: He is alert and oriented to person, place, and time. Mental status is at baseline.   Psychiatric:         Mood and Affect: Mood normal.          Labs:  Lab Results   Component Value Date    WBC 9.50 04/01/2024    RBC 3.12 (L) 04/01/2024    HGB 11.3 (L) 04/01/2024    HCT 33.9 (L) 04/01/2024     (H) 04/01/2024    MCH 36.2 (H) 04/01/2024    MCHC 33.3 04/01/2024    RDW 17.6 (H) 04/01/2024    PLT 85 (L) 04/01/2024    MPV 10.5 04/01/2024    GRAN 5.7 04/01/2024    GRAN 59.6 04/01/2024    LYMPH 2.5 04/01/2024    LYMPH 26.1 04/01/2024    MONO 1.2 (H) 04/01/2024    MONO 12.1 04/01/2024    EOS 0.2 04/01/2024    BASO 0.03 04/01/2024    EOSINOPHIL 1.7 04/01/2024    BASOPHIL 0.3 04/01/2024     Sodium   Date Value  Ref Range Status   04/01/2024 137 136 - 145 mmol/L Final   09/02/2018 135 134 - 144 mmol/L      Potassium   Date Value Ref Range Status   04/01/2024 4.5 3.5 - 5.1 mmol/L Final     Chloride   Date Value Ref Range Status   04/01/2024 104 95 - 110 mmol/L Final   09/02/2018 102 98 - 110 mmol/L      CO2   Date Value Ref Range Status   04/01/2024 24 23 - 29 mmol/L Final     Glucose   Date Value Ref Range Status   04/01/2024 123 (H) 70 - 110 mg/dL Final   09/02/2018 99 70 - 99 mg/dL      BUN   Date Value Ref Range Status   04/01/2024 13 6 - 20 mg/dL Final     Creatinine   Date Value Ref Range Status   04/01/2024 1.9 (H) 0.5 - 1.4 mg/dL Final   09/02/2018 0.91 0.60 - 1.40 mg/dL      Calcium   Date Value Ref Range Status   04/01/2024 9.3 8.7 - 10.5 mg/dL Final     Total Protein   Date Value Ref Range Status   04/01/2024 8.1 6.0 - 8.4 g/dL Final     Albumin   Date Value Ref Range Status   04/01/2024 3.6 3.5 - 5.2 g/dL Final     Total Bilirubin   Date Value Ref Range Status   04/01/2024 3.7 (H) 0.1 - 1.0 mg/dL Final     Comment:     For infants and newborns, interpretation of results should be based  on gestational age, weight and in agreement with clinical  observations.    Premature Infant recommended reference ranges:  Up to 24 hours.............<8.0 mg/dL  Up to 48 hours............<12.0 mg/dL  3-5 days..................<15.0 mg/dL  6-29 days.................<15.0 mg/dL       Alkaline Phosphatase   Date Value Ref Range Status   04/01/2024 97 55 - 135 U/L Final     AST   Date Value Ref Range Status   04/01/2024 34 10 - 40 U/L Final     ALT   Date Value Ref Range Status   04/01/2024 15 10 - 44 U/L Final     Anion Gap   Date Value Ref Range Status   04/01/2024 9 8 - 16 mmol/L Final     eGFR if    Date Value Ref Range Status   07/21/2022 39.8 (A) >60 mL/min/1.73 m^2 Final     eGFR if non    Date Value Ref Range Status   07/21/2022 34.4 (A) >60 mL/min/1.73 m^2 Final     Comment:     Calculation  used to obtain the estimated glomerular filtration  rate (eGFR) is the CKD-EPI equation.          A/P:    Thrombocytopenia and anemia  -will always have some degree of this due to his cirrhosis  -will check iron levels, b12, and folate    Cirrhosis  -check AFP    RTC in 1 year      Aurash Khoobehi, MD  Hematology and Oncology

## 2024-04-26 LAB
OHS CV EVENT MONITOR DAY: 7
OHS CV HOLTER HOOKUP DATE: NORMAL
OHS CV HOLTER HOOKUP TIME: NORMAL
OHS CV HOLTER LENGTH DECIMAL HOURS: 175
OHS CV HOLTER LENGTH HOURS: 7
OHS CV HOLTER LENGTH MINUTES: 0
OHS CV HOLTER SCAN DATE: NORMAL
OHS CV HOLTER SINUS AVERAGE HR: 94 BPM
OHS CV HOLTER SINUS MAX HR: 194 BPM
OHS CV HOLTER SINUS MIN HR: 55 BPM
OHS CV HOLTER STUDY END DATE: NORMAL
OHS CV HOLTER STUDY END TIME: NORMAL

## 2024-04-29 ENCOUNTER — TELEPHONE (OUTPATIENT)
Dept: CARDIOLOGY | Facility: CLINIC | Age: 49
End: 2024-04-29
Payer: MEDICARE

## 2024-05-13 DIAGNOSIS — R79.1 ELEVATED CLOTTING TIME: ICD-10-CM

## 2024-05-13 DIAGNOSIS — K70.30 ALCOHOLIC CIRRHOSIS OF LIVER WITHOUT ASCITES: ICD-10-CM

## 2024-05-13 DIAGNOSIS — D69.6 THROMBOCYTOPENIA: ICD-10-CM

## 2024-05-13 DIAGNOSIS — L70.0 ACNE VULGARIS: ICD-10-CM

## 2024-05-13 RX ORDER — FUROSEMIDE 20 MG/1
20 TABLET ORAL DAILY
Qty: 30 TABLET | Refills: 11 | Status: CANCELLED | OUTPATIENT
Start: 2024-05-13 | End: 2025-05-13

## 2024-05-13 RX ORDER — SPIRONOLACTONE 50 MG/1
50 TABLET, FILM COATED ORAL DAILY
Qty: 30 TABLET | Refills: 11 | Status: CANCELLED | OUTPATIENT
Start: 2024-05-13 | End: 2025-05-13

## 2024-05-13 RX ORDER — AMLODIPINE BESYLATE 10 MG/1
10 TABLET ORAL DAILY
Qty: 30 TABLET | Refills: 11 | Status: CANCELLED | OUTPATIENT
Start: 2024-05-13 | End: 2025-05-13

## 2024-05-13 RX ORDER — CALCITRIOL 0.25 UG/1
0.25 CAPSULE ORAL DAILY
Qty: 30 CAPSULE | Refills: 1 | Status: CANCELLED | OUTPATIENT
Start: 2024-05-13

## 2024-05-14 RX ORDER — SPIRONOLACTONE 50 MG/1
50 TABLET, FILM COATED ORAL DAILY
Qty: 30 TABLET | Refills: 11 | Status: CANCELLED | OUTPATIENT
Start: 2024-05-13 | End: 2025-05-13

## 2024-05-14 RX ORDER — LANOLIN ALCOHOL/MO/W.PET/CERES
100 CREAM (GRAM) TOPICAL DAILY
Qty: 30 TABLET | Refills: 5 | Status: SHIPPED | OUTPATIENT
Start: 2024-05-14

## 2024-05-14 RX ORDER — FOLIC ACID 1 MG/1
1 TABLET ORAL DAILY
Qty: 30 TABLET | Refills: 5 | Status: SHIPPED | OUTPATIENT
Start: 2024-05-14

## 2024-05-14 RX ORDER — CALCITRIOL 0.25 UG/1
0.25 CAPSULE ORAL DAILY
Qty: 30 CAPSULE | Refills: 1 | Status: CANCELLED | OUTPATIENT
Start: 2024-05-13

## 2024-05-14 RX ORDER — CLINDAMYCIN AND BENZOYL PEROXIDE 10; 50 MG/G; MG/G
GEL TOPICAL 2 TIMES DAILY
Qty: 50 G | Refills: 1 | Status: SHIPPED | OUTPATIENT
Start: 2024-05-14 | End: 2024-06-12

## 2024-05-14 RX ORDER — AMLODIPINE BESYLATE 10 MG/1
10 TABLET ORAL DAILY
Qty: 30 TABLET | Refills: 11 | Status: CANCELLED | OUTPATIENT
Start: 2024-05-13 | End: 2025-05-13

## 2024-05-14 RX ORDER — FUROSEMIDE 20 MG/1
20 TABLET ORAL DAILY
Qty: 30 TABLET | Refills: 11 | Status: CANCELLED | OUTPATIENT
Start: 2024-05-13 | End: 2025-05-13

## 2024-05-14 NOTE — TELEPHONE ENCOUNTER
No care due was identified.  [facility] Embedded Care Due Messages. Reference number: [id number].   5/13/2024 10:52:55 PM CDT

## 2024-05-14 NOTE — TELEPHONE ENCOUNTER
Refill Routing Note   Medication(s) are not appropriate for processing by [facility] for the following reason(s):        Outside of protocol    [facility] action(s):  Route             Appointments  past 12m or future 3m with PCP    Date Provider   Last Visit   4/5/2024 Edouard Gibson MD   Next Visit   5/13/2024 Edouard Gibson MD   ED visits in past 90 days: 0        Note composed:7:41 AM 05/14/2024

## 2024-06-06 ENCOUNTER — OFFICE VISIT (OUTPATIENT)
Dept: CARDIOLOGY | Facility: CLINIC | Age: 49
End: 2024-06-06
Payer: MEDICARE

## 2024-06-06 VITALS
DIASTOLIC BLOOD PRESSURE: 78 MMHG | HEIGHT: 72 IN | WEIGHT: 227.06 LBS | SYSTOLIC BLOOD PRESSURE: 134 MMHG | OXYGEN SATURATION: 98 % | HEART RATE: 94 BPM | BODY MASS INDEX: 30.75 KG/M2

## 2024-06-06 DIAGNOSIS — I51.89 DIASTOLIC DYSFUNCTION: ICD-10-CM

## 2024-06-06 DIAGNOSIS — I47.20 VENTRICULAR TACHYCARDIA: Primary | ICD-10-CM

## 2024-06-06 DIAGNOSIS — R94.31 ABNORMAL ELECTROCARDIOGRAM (ECG) (EKG): ICD-10-CM

## 2024-06-06 DIAGNOSIS — E78.5 DYSLIPIDEMIA: ICD-10-CM

## 2024-06-06 DIAGNOSIS — I10 HYPERTENSION, UNSPECIFIED TYPE: ICD-10-CM

## 2024-06-06 DIAGNOSIS — F10.20 ALCOHOL USE DISORDER, SEVERE, DEPENDENCE: Chronic | ICD-10-CM

## 2024-06-06 DIAGNOSIS — F12.90 MARIJUANA SMOKER: ICD-10-CM

## 2024-06-06 PROCEDURE — 99999 PR PBB SHADOW E&M-EST. PATIENT-LVL IV: CPT | Mod: PBBFAC,TXP,,

## 2024-06-06 PROCEDURE — 99214 OFFICE O/P EST MOD 30 MIN: CPT | Mod: PBBFAC,PN,TXP

## 2024-06-06 RX ORDER — FUROSEMIDE 20 MG/1
20 TABLET ORAL DAILY
Qty: 90 TABLET | Refills: 3 | Status: SHIPPED | OUTPATIENT
Start: 2024-06-06 | End: 2024-06-12

## 2024-06-06 RX ORDER — SPIRONOLACTONE 25 MG/1
25 TABLET ORAL DAILY
Qty: 90 TABLET | Refills: 3 | Status: SHIPPED | OUTPATIENT
Start: 2024-06-06 | End: 2025-06-06

## 2024-06-06 RX ORDER — AMLODIPINE BESYLATE 10 MG/1
10 TABLET ORAL DAILY
Qty: 90 TABLET | Refills: 3 | Status: SHIPPED | OUTPATIENT
Start: 2024-06-06 | End: 2024-06-12

## 2024-06-06 NOTE — ASSESSMENT & PLAN NOTE
Clinically euvolemic in clinic today.  Restart patient's Lasix 20 mg daily and decreased Aldactone to 25 mg daily.  Continue beta-blocker as prescribed.

## 2024-06-06 NOTE — ASSESSMENT & PLAN NOTE
Patient reports smoking marijuana on a daily basis since giving.  Patient was educated on side effects of marijuana.  Verbalized understanding.

## 2024-06-06 NOTE — ASSESSMENT & PLAN NOTE
Reviewed the results of patient's Zio monitor see below:  Patient had a min HR of 55 bpm, max HR of 194 bpm, and avg HR of 94 bpm. Predominant underlying rhythm was Sinus Rhythm. 2 Ventricular Tachycardia runs occurred, the run with the fastest interval lasting 7 beats with a max rate of 194 bpm, the longest lasting 7 beats with an avg rate of 108 bpm. Isolated SVEs were rare (<1.0%, 225), SVE Couplets were rare (<1.0%, 5), and SVE Triplets were rare (<1.0%, 2). Isolated VEs were rare (<1.0%, 186), VE Couplets were rare (<1.0%, 3), and VE Triplets were rare (<1.0%, 2).      Potassium and magnesium within normal limits.  We will continue on metoprolol 50 mg b.I.d. patient instructed to hold the medication if his heart rate is noted to be below 50.  If he does experience more or  more frequent palpitations at home, instructed to call the office.  However, at this time he appears stable.  Anemia appears to be improving as well.      We will obtain a stress test for further ischemic workup.

## 2024-06-06 NOTE — ASSESSMENT & PLAN NOTE
Blood pressure stable today in clinic at 134/78.  Continue metoprolol tartrate 50 mg b.I.d. and restart amlodipine 10 mg daily as patient has been out.  Patient instructed to keep a blood pressure log at home and will bring to the next visit.

## 2024-06-06 NOTE — PROGRESS NOTES
Subjective:    Patient ID:  Irvin Diaz Jr. is a 48 y.o. male who presents for follow-up.   Chief Complaint   Patient presents with    Results     Cardiac monitor        HPI:  Mr. Diaz is a 48-year-old male with past medical history of alcoholic cirrhosis of the liver, arthritis, CHF, esophageal varices, GERD, recent GI bleed, hypertension, and anemia who comes into the clinic today to discuss the results of his cardiac monitor as well as for a follow-up visit after a recent hospital discharge.  See discharge summary below:    The patient was admitted and observed for any bleeding, persistent vomiting or change in blood pressure.  He was continued on octreotide and ppi. GI was consulted and planned an EGD. The patient has chronic thrombocytopenia, and this was monitored closely, with the plan to transfuse platelets for count less than 10,000 and stop dvt prophylaxis for count less then 50,000. EGD was done and revealed no acute findings. The patient had a 15-beat run of V-tach on 03/29. EKG was ordered and revealed no change from previous EKG's, with prolonged QT interval. Echo and cardiology consult were ordered. Patient placed on lopressor.  And did not have further episodes V-tach.  He was cleared for discharge by Cardiology.  Patient was discharged home with referral to ENT for epistaxis.  He will also follow up with PCP and Cardiology.       Today, he denies any chest pain or shortness of breath with exertion.  He states that he is no longer drinking alcohol however he has recently  taken up smoking marijuana on a daily basis.  Denies any blood in the urine or stool.  States he is due to follow up with primary as well and  he has been out of all of his medications besides his beta blocker for at least the last 2 weeks.      Review of patient's allergies indicates:   Allergen Reactions    Ciprofloxacin hcl Hallucinations    Meperidine Hives     Pt medicated w/multiple medications (demerol, protonix,  and zofran)  w/i 10 mins time frame prior to developing localized hives near IV site that med was administered. Pt reports he has/takes all other medications on daily basis.     Morphine Itching    Nsaids (non-steroidal anti-inflammatory drug)      Kidney Disease    Tylenol [acetaminophen]      Hx of liver disease       Past Medical History:   Diagnosis Date    Alcohol withdrawal 5/1/2022    Alcoholic cirrhosis of liver     Alcoholic ketoacidosis 6/6/2021    Arthritis     ATN (acute tubular necrosis)     CHF (congestive heart failure)     Diverticulitis 01/2020    Esophageal varices     GERD (gastroesophageal reflux disease)     GI bleed     Hip arthritis     Left    Hypertension     Liver cirrhosis     Macrocytic anemia     Pulmonary embolism 08/2018    Unprovoked DVT.  Stop Coumadin due to GIB    Thrombocytopenia      Past Surgical History:   Procedure Laterality Date    ANKLE SURGERY      BLOCK, NERVE, PERIPHERAL Left 06/24/2022    Procedure: Left Hip femoral-obturator accessory nerve block;  Surgeon: Robbin De La Garza DO;  Location: [facility];  Service: Pain Management;  Laterality: Left;    COLON SURGERY  2007    COLONOSCOPY  09/05/2019    [facility]    COLONOSCOPY N/A 02/17/2021    Procedure: COLONOSCOPY;  Surgeon: Renea Billingsley MD;  Location: [facility];  Service: Endoscopy;  Laterality: N/A;    COLONOSCOPY N/A 2/13/2023    Procedure: COLONOSCOPY;  Surgeon: Rudy Orellana MD;  Location: [facility] ([address] [place] [address]);  Service: Endoscopy;  Laterality: N/A;  cirrhosis-labs done on 1/11/23  instr portal-GT  No answer for precall- [place]    COLONOSCOPY N/A 3/10/2023    Procedure: COLONOSCOPY;  Surgeon: Rudy Orellana MD;  Location: [facility] ([address] [place] [address]);  Service: Endoscopy;  Laterality: N/A;  inst via poral per pt request    COLONOSCOPY W/ BIOPSIES  02/17/2021    ESOPHAGOGASTRODUODENOSCOPY N/A 07/26/2019    Procedure: EGD (ESOPHAGOGASTRODUODENOSCOPY);  Surgeon: Brian Trivedi MD;  Location: [facility];  Service: Endoscopy;   Laterality: N/A;    ESOPHAGOGASTRODUODENOSCOPY N/A 04/08/2020    Procedure: EGD (ESOPHAGOGASTRODUODENOSCOPY);  Surgeon: Donn Acuna MD;  Location: [facility];  Service: Endoscopy;  Laterality: N/A;    ESOPHAGOGASTRODUODENOSCOPY N/A 01/03/2021    Procedure: EGD (ESOPHAGOGASTRODUODENOSCOPY)- coffee ground emesis, hx varices;  Surgeon: Steve Chairez MD;  Location: [facility];  Service: Endoscopy;  Laterality: N/A;    ESOPHAGOGASTRODUODENOSCOPY N/A 05/03/2021    Procedure: EGD (ESOPHAGOGASTRODUODENOSCOPY);  Surgeon: Jeanmarie Ngo MD;  Location: [facility];  Service: Endoscopy;  Laterality: N/A;    ESOPHAGOGASTRODUODENOSCOPY N/A 07/19/2021    Procedure: ESOPHAGOGASTRODUODENOSCOPY (EGD);  Surgeon: Claudio Medeiros MD;  Location: [facility];  Service: Endoscopy;  Laterality: N/A;    ESOPHAGOGASTRODUODENOSCOPY  12/20/2021    ESOPHAGOGASTRODUODENOSCOPY N/A 12/20/2021    Procedure: EGD (ESOPHAGOGASTRODUODENOSCOPY);  Surgeon: Ajay Jackson MD;  Location: [facility];  Service: Endoscopy;  Laterality: N/A;    ESOPHAGOGASTRODUODENOSCOPY N/A 05/03/2022    Procedure: EGD (ESOPHAGOGASTRODUODENOSCOPY);  Surgeon: Brian Trivedi MD;  Location: [facility];  Service: Endoscopy;  Laterality: N/A;    ESOPHAGOGASTRODUODENOSCOPY N/A 7/7/2022    Procedure: EGD (ESOPHAGOGASTRODUODENOSCOPY);  Surgeon: Ajay Jackson MD;  Location: [facility];  Service: Endoscopy;  Laterality: N/A;    ESOPHAGOGASTRODUODENOSCOPY N/A 11/29/2022    Procedure: EGD (ESOPHAGOGASTRODUODENOSCOPY);  Surgeon: Edouard Maynard MD;  Location: [address]);  Service: Endoscopy;  Laterality: N/A;    ESOPHAGOGASTRODUODENOSCOPY N/A 4/28/2023    Procedure: EGD (ESOPHAGOGASTRODUODENOSCOPY);  Surgeon: Caesar Dickens MD;  Location: [facility];  Service: Endoscopy;  Laterality: N/A;    ESOPHAGOGASTRODUODENOSCOPY N/A 3/28/2024    Procedure: EGD (ESOPHAGOGASTRODUODENOSCOPY);  Surgeon: Jeanmarie Ngo MD;  Location: [facility];  Service: Endoscopy;   Laterality: N/A;    HERNIA REPAIR Left     Inguinal    INJECTION OF JOINT Right 2023    Procedure: RT Hip Injection;  Surgeon: Robbin De La Garza DO;  Location: [facility];  Service: Pain Management;  Laterality: Right;  oral - 20 mins    UPPER GASTROINTESTINAL ENDOSCOPY N/A 2022     Social History     Tobacco Use    Smoking status: Former     Current packs/day: 0.00     Types: Cigarettes     Quit date:      Years since quittin.4    Smokeless tobacco: Never    Tobacco comments:     quit 20 years ago   Substance Use Topics    Alcohol use: Not Currently     Comment: Quit drinking 21    Drug use: Not Currently     Family History   Problem Relation Name Age of Onset    Hypertension Mother      Breast cancer Mother      Hypertension Father      Prostate cancer Father      Bladder Cancer Father          Review of Systems:   Constitution: Negative for diaphoresis and fever.   HEENT: Negative for nosebleeds.    Cardiovascular: Negative for chest pain       No dyspnea on exertion       No leg swelling        + palpitations -   Infrequent  Respiratory: Negative for shortness of breath and wheezing.    Hematologic/Lymphatic: Negative for bleeding problem. Does not bruise/bleed easily.   Skin: Negative for color change and rash.   Musculoskeletal: Negative for falls and myalgias.   Gastrointestinal: Negative for hematemesis and hematochezia.   Genitourinary: Negative for hematuria.   Neurological: Negative for dizziness and light-headedness.   Psychiatric/Behavioral: Negative for altered mental status and memory loss.          Objective:        Vitals:    24 0837   BP: 134/78   Pulse: 94       Lab Results   Component Value Date    WBC 9.50 2024    HGB 11.3 (L) 2024    HCT 33.9 (L) 2024    PLT 85 (L) 2024    CHOL 146 08/15/2022    TRIG 90 08/15/2022    HDL 30 (L) 08/15/2022    ALT 15 2024    AST 34 2024     2024    K 4.5 2024      04/01/2024    CREATININE 1.9 (H) 04/01/2024    BUN 13 04/01/2024    CO2 24 04/01/2024    TSH 1.676 08/15/2022    PSA 1.2 07/21/2022    INR 1.2 03/28/2024    HGBA1C 5.7 11/12/2020        ECHOCARDIOGRAM RESULTS  Results for orders placed during the hospital encounter of 03/27/24    Echo Saline Bubble? No    Interpretation Summary    Left Ventricle: The left ventricle is normal in size. Normal wall thickness. There is concentric hypertrophy. There is normal systolic function. Biplane (2D) method of discs ejection fraction is 57%. There is normal diastolic function.    Right Ventricle: Normal right ventricular cavity size. Wall thickness is normal. Right ventricle wall motion  is normal. Systolic function is normal.    Left Atrium: Left atrium is mildly dilated.    Mitral Valve: There is mild to moderate regurgitation with a centrally directed jet.    Pulmonary Artery: The estimated pulmonary artery systolic pressure is 19 mmHg.    IVC/SVC: Normal venous pressure at 3 mmHg.        CURRENT/PREVIOUS VISIT EKG  Results for orders placed or performed during the hospital encounter of 03/27/24   EKG 12-lead    Collection Time: 03/31/24  8:48 AM   Result Value Ref Range    QRS Duration 92 ms    [facility] QTC Calculation 463 ms    Narrative    Test Reason : R07.9,    Vent. Rate : 053 BPM     Atrial Rate : 053 BPM     P-R Int : 148 ms          QRS Dur : 092 ms      QT Int : 494 ms       P-R-T Axes : 062 061 064 degrees     QTc Int : 463 ms    Sinus bradycardia  Otherwise normal ECG  No previous ECGs available  Confirmed by Tye ALLAN, Elver MENDOZA ([id number]) on 4/2/2024 11:04:45 PM    Referred By: [email or web address]   SELF           Confirmed By:Elver Gamble MD     No valid procedures specified.   Results for orders placed during the hospital encounter of 07/22/22    Nuclear Stress - Cardiology Interpreted    Interpretation Summary    Normal myocardial perfusion scan. There is no evidence of myocardial ischemia or infarction.    The gated  perfusion images showed an ejection fraction of 58% at rest. The gated perfusion images showed an ejection fraction of 58% post stress. Normal ejection fraction is greater than 53%.    There is normal wall motion at rest and post stress.    LV cavity size is normal at rest and normal at stress.    The EKG portion of this study is negative for ischemia.    The patient reported no chest pain during the stress test.    There were no arrhythmias during stress.    When compared to the previous study from 11/7/2020, there are no significant changes.      Physical Exam:  CONSTITUTIONAL: No fever, no chills  HEENT: Normocephalic, atraumatic,pupils reactive to light                 NECK:  No JVD no carotid bruit  CVS: S1S2+, RRR, no murmurs,   LUNGS: Clear  ABDOMEN: Soft, NT, BS+  EXTREMITIES: No cyanosis, edema  : No boyce catheter  NEURO: AAO X 3  PSY: Normal affect      Medication List with Changes/Refills   Current Medications    ACAMPROSATE (CAMPRAL) 333 MG TABLET    Take 2 tablets (666 mg total) by mouth 3 (three) times daily.    AUGMENTED BETAMETHASONE DIPROPIONATE (DIPROLENE-AF) 0.05 % CREAM    Apply topically 2 (two) times daily.    CALCITRIOL (ROCALTROL) 0.25 MCG CAP    Take 1 capsule (0.25 mcg total) by mouth once daily.    CLINDAMYCIN-BENZOYL PEROXIDE (BENZACLIN) GEL    Apply topically 2 (two) times daily.    FERROUS GLUCONATE (FERGON) 240 (27 FE) MG TABLET    Take 2 tablets (480 mg total) by mouth 2 (two) times daily with meals.    FOLIC ACID (FOLVITE) 1 MG TABLET    Take 1 tablet (1 mg total) by mouth once daily.    HYDROXYZINE HCL (ATARAX) 25 MG TABLET    Take 25 mg by mouth nightly as needed for Itching.    METOPROLOL TARTRATE (LOPRESSOR) 50 MG TABLET    Take 1 tablet (50 mg total) by mouth 2 (two) times daily. Do not take if heart rate is less than 60    PANTOPRAZOLE (PROTONIX) 40 MG TABLET    Take 2 tablets (80 mg total) by mouth 2 (two) times daily.    RIFAXIMIN (XIFAXAN) 550 MG TAB    Take 1 tablet (550  mg total) by mouth 2 (two) times daily.    SILDENAFIL (VIAGRA) 100 MG TABLET    Take 1 tablet (100 mg total) by mouth daily as needed for Erectile Dysfunction.    TADALAFIL (CIALIS) 20 MG TAB    Take 1 tablet (20 mg total) by mouth daily as needed (erectile dysfunction).    THIAMINE 100 MG TABLET    Take 1 tablet (100 mg total) by mouth once daily.    TRAMADOL (ULTRAM) 50 MG TABLET    Take 1 tablet (50 mg total) by mouth 4 (four) times daily as needed for Pain.   Changed and/or Refilled Medications    Modified Medication Previous Medication    AMLODIPINE (NORVASC) 10 MG TABLET amLODIPine (NORVASC) 10 MG tablet       Take 1 tablet (10 mg total) by mouth once daily.    Take 1 tablet (10 mg total) by mouth once daily.    FUROSEMIDE (LASIX) 20 MG TABLET furosemide (LASIX) 20 MG tablet       Take 1 tablet (20 mg total) by mouth once daily.    Take 1 tablet (20 mg total) by mouth once daily.    SPIRONOLACTONE (ALDACTONE) 25 MG TABLET spironolactone (ALDACTONE) 50 MG tablet       Take 1 tablet (25 mg total) by mouth once daily.    Take 1 tablet (50 mg total) by mouth once daily.             Assessment:       1. Ventricular tachycardia    2. Dyslipidemia    3. Hypertension, unspecified type    4. Abnormal electrocardiogram (ECG) (EKG)    5. Diastolic dysfunction    6. Alcohol use disorder, severe, dependence    7. Marijuana smoker         Plan:     Problem List Items Addressed This Visit          Psychiatric    Alcohol use disorder, severe, dependence (Chronic)    Current Assessment & Plan       In remission, patient reports no alcohol use since discharge.            Cardiac/Vascular    Diastolic dysfunction    Current Assessment & Plan       Clinically euvolemic in clinic today.  Restart patient's Lasix 20 mg daily and decreased Aldactone to 25 mg daily.  Continue beta-blocker as prescribed.         HTN (hypertension)    Current Assessment & Plan       Blood pressure stable today in clinic at 134/78.  Continue metoprolol  tartrate 50 mg b.I.d. and restart amlodipine 10 mg daily as patient has been out.  Patient instructed to keep a blood pressure log at home and will bring to the next visit.         Relevant Orders    Nuclear Stress - Cardiology Interpreted    Ventricular tachycardia - Primary    Current Assessment & Plan     Reviewed the results of patient's Zio monitor see below:  Patient had a min HR of 55 bpm, max HR of 194 bpm, and avg HR of 94 bpm. Predominant underlying rhythm was Sinus Rhythm. 2 Ventricular Tachycardia runs occurred, the run with the fastest interval lasting 7 beats with a max rate of 194 bpm, the longest lasting 7 beats with an avg rate of 108 bpm. Isolated SVEs were rare (<1.0%, 225), SVE Couplets were rare (<1.0%, 5), and SVE Triplets were rare (<1.0%, 2). Isolated VEs were rare (<1.0%, 186), VE Couplets were rare (<1.0%, 3), and VE Triplets were rare (<1.0%, 2).      Potassium and magnesium within normal limits.  We will continue on metoprolol 50 mg b.I.d. patient instructed to hold the medication if his heart rate is noted to be below 50.  If he does experience more or  more frequent palpitations at home, instructed to call the office.  However, at this time he appears stable.  Anemia appears to be improving as well.      We will obtain a stress test for further ischemic workup.         Relevant Orders    Basic Metabolic Panel    Nuclear Stress - Cardiology Interpreted       Palliative Care    Marijuana smoker    Current Assessment & Plan      Patient reports smoking marijuana on a daily basis since giving.  Patient was educated on side effects of marijuana.  Verbalized understanding.          Other Visit Diagnoses       Dyslipidemia        Relevant Orders    Lipid Panel    Abnormal electrocardiogram (ECG) (EKG)        Relevant Orders    Nuclear Stress - Cardiology Interpreted            Follow up in about 3 months (around 9/6/2024).

## 2024-06-12 ENCOUNTER — OFFICE VISIT (OUTPATIENT)
Dept: PRIMARY CARE CLINIC | Facility: CLINIC | Age: 49
End: 2024-06-12
Payer: MEDICARE

## 2024-06-12 VITALS
OXYGEN SATURATION: 95 % | BODY MASS INDEX: 30.54 KG/M2 | HEIGHT: 72 IN | SYSTOLIC BLOOD PRESSURE: 134 MMHG | RESPIRATION RATE: 18 BRPM | DIASTOLIC BLOOD PRESSURE: 76 MMHG | HEART RATE: 80 BPM | WEIGHT: 225.5 LBS

## 2024-06-12 DIAGNOSIS — Z13.1 DIABETES MELLITUS SCREENING: ICD-10-CM

## 2024-06-12 DIAGNOSIS — Z00.00 ANNUAL PHYSICAL EXAM: Primary | ICD-10-CM

## 2024-06-12 DIAGNOSIS — N18.32 CKD STAGE 3B, GFR 30-44 ML/MIN: ICD-10-CM

## 2024-06-12 DIAGNOSIS — Z79.899 ENCOUNTER FOR LONG-TERM (CURRENT) USE OF OTHER MEDICATIONS: ICD-10-CM

## 2024-06-12 DIAGNOSIS — K70.30 ALCOHOLIC CIRRHOSIS OF LIVER WITHOUT ASCITES: ICD-10-CM

## 2024-06-12 DIAGNOSIS — I10 BENIGN ESSENTIAL HTN: ICD-10-CM

## 2024-06-12 PROBLEM — E46 MALNUTRITION: Status: RESOLVED | Noted: 2022-05-21 | Resolved: 2024-06-12

## 2024-06-12 PROBLEM — E72.20 HYPERAMMONEMIA: Status: RESOLVED | Noted: 2024-04-05 | Resolved: 2024-06-12

## 2024-06-12 PROCEDURE — 99999 PR PBB SHADOW E&M-EST. PATIENT-LVL V: CPT | Mod: PBBFAC,TXP,, | Performed by: STUDENT IN AN ORGANIZED HEALTH CARE EDUCATION/TRAINING PROGRAM

## 2024-06-12 PROCEDURE — 99215 OFFICE O/P EST HI 40 MIN: CPT | Mod: PBBFAC,PN,NTX | Performed by: STUDENT IN AN ORGANIZED HEALTH CARE EDUCATION/TRAINING PROGRAM

## 2024-06-12 PROCEDURE — 99214 OFFICE O/P EST MOD 30 MIN: CPT | Mod: S$PBB,NTX,, | Performed by: STUDENT IN AN ORGANIZED HEALTH CARE EDUCATION/TRAINING PROGRAM

## 2024-06-12 NOTE — PROGRESS NOTES
Subjective:       Patient ID: Irvin Diaz Jr. is a 48 y.o. male.    Chief Complaint: Annual Exam      HPI:  48 y.o. male presents to [facility] for annual exam    Acute concerns?: None at this time.    Patient reports hallucinated on ciprofloxacin. Did have elevation in ammonia in the past.    Last saw Dr. Pérez with Hepatology?: Hasn't seen, only phone  Last saw transplant clinic?: Missed appointment as was out of town and taken off list. Patient reports living locally more stable now and would like referral. No longer drinking alcohol.      Review of Systems   Constitutional:  Negative for chills, diaphoresis, fatigue and fever.   HENT:  Negative for congestion, sinus pressure, sneezing and sore throat.    Respiratory:  Negative for cough and shortness of breath.    Cardiovascular:  Positive for palpitations (Is following with Cardiology and had recent monitoring. Last saw last week.). Negative for chest pain.   Gastrointestinal:  Negative for abdominal pain, diarrhea, nausea and vomiting.   Musculoskeletal:  Positive for arthralgias (In left hip. Can get pains to left side. Still with Pain Management). Negative for joint swelling and myalgias.   Skin:  Negative for rash and wound.   Neurological:  Negative for dizziness, light-headedness and headaches.       Objective:      Vitals:    06/12/24 1306   BP: 134/76   BP Location: Left arm   Patient Position: Sitting   BP Method: Medium (Manual)   Pulse: 80   Resp: 18   SpO2: 95%   Weight: 102.3 kg (225 lb 8.5 oz)   Height: 6' (1.829 m)     Physical Exam  Vitals reviewed.   Constitutional:       General: He is not in acute distress.     Appearance: Normal appearance. He is not ill-appearing.   HENT:      Head: Normocephalic and atraumatic.   Eyes:      General:         Right eye: No discharge.         Left eye: No discharge.      Conjunctiva/sclera: Conjunctivae normal.   Cardiovascular:      Rate and Rhythm: Normal rate and regular rhythm.      Pulses:  Normal pulses.      Heart sounds: Normal heart sounds. No murmur heard.  Pulmonary:      Effort: Pulmonary effort is normal.      Breath sounds: Normal breath sounds.   Abdominal:      General: Bowel sounds are normal.      Palpations: Abdomen is soft.      Tenderness: There is no abdominal tenderness.   Musculoskeletal:         General: No deformity.      Cervical back: Neck supple. No rigidity.   Lymphadenopathy:      Cervical: No cervical adenopathy.   Skin:     General: Skin is warm and dry.      Coloration: Skin is not jaundiced.   Neurological:      General: No focal deficit present.      Mental Status: He is alert and oriented to person, place, and time.   Psychiatric:         Mood and Affect: Mood normal.         Behavior: Behavior normal.             Lab Results   Component Value Date     04/01/2024     09/02/2018    K 4.5 04/01/2024     04/01/2024     09/02/2018    CO2 24 04/01/2024    BUN 13 04/01/2024    CREATININE 1.9 (H) 04/01/2024    CREATININE 0.91 09/02/2018    GLUCOSE 103 (H) 03/30/2021    ANIONGAP 9 04/01/2024     Lab Results   Component Value Date    HGBA1C 5.7 11/12/2020     Lab Results   Component Value Date    BNP 48 04/28/2023     (H) 07/18/2022     (H) 03/07/2022       Lab Results   Component Value Date    WBC 9.50 04/01/2024    HGB 11.3 (L) 04/01/2024    HCT 33.9 (L) 04/01/2024    HCT 25 (L) 11/01/2022    PLT 85 (L) 04/01/2024    GRAN 5.7 04/01/2024    GRAN 59.6 04/01/2024     Lab Results   Component Value Date    CHOL 146 08/15/2022    HDL 30 (L) 08/15/2022    LDLCALC 98.0 08/15/2022    TRIG 90 08/15/2022          Current Outpatient Medications:     metoprolol tartrate (LOPRESSOR) 50 MG tablet, Take 1 tablet (50 mg total) by mouth 2 (two) times daily. Do not take if heart rate is less than 60, Disp: 60 tablet, Rfl: 11    tadalafiL (CIALIS) 20 MG Tab, Take 1 tablet (20 mg total) by mouth daily as needed (erectile dysfunction)., Disp: 15 tablet, Rfl:  11    traMADoL (ULTRAM) 50 mg tablet, Take 1 tablet (50 mg total) by mouth 4 (four) times daily as needed for Pain., Disp: 120 each, Rfl: 2    calcitRIOL (ROCALTROL) 0.25 MCG Cap, Take 1 capsule (0.25 mcg total) by mouth once daily. (Patient not taking: Reported on 4/22/2024), Disp: 30 capsule, Rfl: 1    ferrous gluconate (FERGON) 240 (27 FE) MG tablet, Take 2 tablets (480 mg total) by mouth 2 (two) times daily with meals. (Patient not taking: Reported on 4/22/2024), Disp: 120 tablet, Rfl: 3    folic acid (FOLVITE) 1 MG tablet, Take 1 tablet (1 mg total) by mouth once daily. (Patient not taking: Reported on 6/6/2024), Disp: 30 tablet, Rfl: 5    rifAXIMin (XIFAXAN) 550 mg Tab, Take 1 tablet (550 mg total) by mouth 2 (two) times daily. (Patient not taking: Reported on 6/6/2024), Disp: 60 tablet, Rfl: 11    spironolactone (ALDACTONE) 25 MG tablet, Take 1 tablet (25 mg total) by mouth once daily. (Patient not taking: Reported on 6/12/2024), Disp: 90 tablet, Rfl: 3    thiamine 100 MG tablet, Take 1 tablet (100 mg total) by mouth once daily. (Patient not taking: Reported on 6/6/2024), Disp: 30 tablet, Rfl: 5        Assessment:       1. Annual physical exam    2. Alcoholic cirrhosis of liver without ascites    3. Benign essential HTN    4. Encounter for long-term (current) use of other medications    5. Diabetes mellitus screening    6. CKD stage 3b, GFR 30-44 ml/min           Plan:       Annual physical exam  Alcoholic cirrhosis of liver without ascites  -     Ambulatory referral/consult to Hepatology; Future; Expected date: 06/19/2024  -     Ambulatory referral/consult to Transplant; Future; Expected date: 06/19/2024  -     VITAMIN B1; Future; Expected date: 06/12/2024  - Will have patient re-establish with transplant clinic as living situation is stable at this time. Remains abstinent of alcohol  - Avoid hepatotoxic agents    Benign essential HTN  -     MICROALBUMIN / CREATININE RATIO URINE  - Controlled at this time.  Will discontinue amlodipine    Encounter for long-term (current) use of other medications  -     HEMOGLOBIN A1C; Future; Expected date: 06/12/2024    Diabetes mellitus screening  -     HEMOGLOBIN A1C; Future; Expected date: 06/12/2024    CKD stage 3b, GFR 30-44 ml/min  -     Ambulatory referral/consult to Nephrology; Future; Expected date: 06/19/2024  - Avoid nephrotoxic agents, stable on recent blood work Apr 2024    RTC for AWV

## 2024-06-14 DIAGNOSIS — N18.30 STAGE 3 CHRONIC KIDNEY DISEASE, UNSPECIFIED WHETHER STAGE 3A OR 3B CKD: Primary | Chronic | ICD-10-CM

## 2024-06-20 ENCOUNTER — HOSPITAL ENCOUNTER (OUTPATIENT)
Dept: RADIOLOGY | Facility: HOSPITAL | Age: 49
Discharge: HOME | End: 2024-06-20
Attending: STUDENT IN AN ORGANIZED HEALTH CARE EDUCATION/TRAINING PROGRAM
Payer: MEDICARE

## 2024-06-20 ENCOUNTER — OFFICE VISIT (OUTPATIENT)
Dept: PAIN MEDICINE | Facility: CLINIC | Age: 49
End: 2024-06-20
Payer: MEDICARE

## 2024-06-20 VITALS
BODY MASS INDEX: 30.54 KG/M2 | WEIGHT: 225.5 LBS | SYSTOLIC BLOOD PRESSURE: 143 MMHG | DIASTOLIC BLOOD PRESSURE: 78 MMHG | HEART RATE: 71 BPM | HEIGHT: 72 IN

## 2024-06-20 DIAGNOSIS — M87.052 AVASCULAR NECROSIS OF HIP, LEFT: ICD-10-CM

## 2024-06-20 DIAGNOSIS — M17.12 PRIMARY OSTEOARTHRITIS OF LEFT KNEE: ICD-10-CM

## 2024-06-20 DIAGNOSIS — F11.90 CHRONIC, CONTINUOUS USE OF OPIOIDS: ICD-10-CM

## 2024-06-20 DIAGNOSIS — M17.12 PRIMARY OSTEOARTHRITIS OF LEFT KNEE: Primary | ICD-10-CM

## 2024-06-20 DIAGNOSIS — F11.20 OPIOID DEPENDENCE, UNCOMPLICATED: ICD-10-CM

## 2024-06-20 DIAGNOSIS — G89.4 CHRONIC PAIN SYNDROME: ICD-10-CM

## 2024-06-20 PROCEDURE — 73562 X-RAY EXAM OF KNEE 3: CPT | Mod: 26,LT,TXP, | Performed by: RADIOLOGY

## 2024-06-20 PROCEDURE — 73562 X-RAY EXAM OF KNEE 3: CPT | Mod: TC,LT,TXP

## 2024-06-20 PROCEDURE — 99999 PR PBB SHADOW E&M-EST. PATIENT-LVL III: CPT | Mod: PBBFAC,,, | Performed by: STUDENT IN AN ORGANIZED HEALTH CARE EDUCATION/TRAINING PROGRAM

## 2024-06-20 RX ORDER — TRIAMCINOLONE ACETONIDE 40 MG/ML
40 INJECTION, SUSPENSION INTRA-ARTICULAR; INTRAMUSCULAR
Status: COMPLETED | OUTPATIENT
Start: 2024-06-20 | End: 2024-06-20

## 2024-06-20 RX ORDER — TRAMADOL HYDROCHLORIDE 50 MG/1
50 TABLET ORAL 4 TIMES DAILY PRN
Qty: 120 EACH | Refills: 2 | Status: SHIPPED | OUTPATIENT
Start: 2024-06-30 | End: 2024-09-28

## 2024-06-20 RX ADMIN — TRIAMCINOLONE ACETONIDE 40 MG: 40 INJECTION, SUSPENSION INTRA-ARTICULAR; INTRAMUSCULAR at 05:06

## 2024-06-20 NOTE — PROCEDURES
Procedures  PROCEDURE: left Knee Injection with Ultrasound Guidance    Patient Name: Irvin Diaz Jr.  MRN: [id number]    PROCEDURE DATE: 6/20/2024    Diagnosis: Chronic Knee Pain  CPT code:  96216 (Arthrocentesis, aspiration and/or injection, large joint or bursa (e.g., shoulder, hip, knee, subacromial bursa); with ultrasound guidance)  Postprocedural Diagnosis: Same  Needle Type: - Stimuplex 21G x 100mm needle    Solution injected: A 6 ml mixture of 0.25% Bupivacaine and 20mg Kenalog     Estimated Blood Loss - <2ml  Drains: None  Specimens Removed: None  Urine Output - Not Measured  Complications: None  Outcome: Good    Informed Consent:  The patient's condition and proposed procedures, risks (including complications of nerve damage,  bleeding, infection, and failure of pain relief), and alternatives were discussed with the patient or responsible party.  The patient's/responsible party's questions were answered.  The patient/responsible party appeared to understand and chose to proceed.  Informed consent was obtained.    Procedure in Detail:  The procedure was performed in the procedure treatment room.  After obtaining consent, the patient was placed in a supine position with the above noted knee in slight degree of flexion. The site for the injection was palpated, identified with ultrasound imaging and marked.    The skin overlying the target site of injection was prepped and draped in an aseptic fashion.      Procedural Pause:  A procedural pause verifying correct patient, medical record number, allergies, medications to be administered, current vital signs, and surgical site was performed immediately prior to beginning the procedure.    The skin and subcutaneous tissue overlying the target site of the injection was anesthetized using 3  ml of 1% lidocaine MPF with a 25-gauge, 1½ -inch needle.      The above noted needle was then inserted horizontally, proximal to the superior and lateral edge of the patella,  between the suprapatellar fat pad and prefemoral fat pad.  The needle was then slowly advanced until it slid into the suprapatellar synovial recess.  After negative aspiration for heme, the above noted solution was injected slowly.  The needle was then retracted and a sterile bandage was placed over the injection site.    The patient tolerated the procedure well and without complications. After meeting discharge criteria, the patient was discharged home safely.    The above noted procedure was not repeated on the opposite side.    DISCUSSION:  Today we performed a ultrasound guided knee injection.  The patient was informed that it may take several days for the steroid medication to reach its full efficacy and they should continue their regular pain medications as prescribed.  The patient was advised to relax and avoid any heavy lifting or excessive bending for 24 hours.   He was advised that he may return to his usual activities after 24 hours if he is otherwise feeling well.  The patient was advised not to bathe or soak in water for 24 hours but that showering would be acceptable.  The patient was instructed that if he experienced fever or chills, new weakness, new sensory changes, any changes in bowel or bladder habits, worsening back pain, new headache, neck stiffness, or other new symptoms, that he should contact the pain clinic immediately or dial 911 if unable to reach the pain clinic.      Note Electronically Signed By:  Robbin Abernathy

## 2024-06-20 NOTE — PROGRESS NOTES
Chronic Pain - f/u    Referring Physician: No ref. provider found    Date: 06/20/2024     Re: Irvin Diaz Jr.  MR#: [id number]  YOB: 1975  Age: 48 y.o.    Chief Complaint: hip pain  Chief Complaint   Patient presents with    Hip Pain     Left, 8/10    Leg Pain     Left, 8/10    Ankle Pain     Left, 8/10     **This note is dictated using the M*Modal Fluency Direct word recognition program. There are word recognition mistakes that are occasionally missed on review.**    ASSESSMENT: 48 y.o. year old male with left hip pain, consistent with     1. Primary osteoarthritis of left knee  X-Ray Knee 3 View Left    triamcinolone acetonide injection 40 mg      2. Chronic, continuous use of opioids  [facility] Drug Screen    traMADoL (ULTRAM) 50 mg tablet      3. Avascular necrosis of hip, left  Pain Clinic Drug Screen    traMADoL (ULTRAM) 50 mg tablet      4. Opioid dependence, uncomplicated  [facility] Clinic Drug Screen      5. Chronic pain syndrome  traMADoL (ULTRAM) 50 mg tablet          PLAN:     Left knee pain likely OA 2/2 altered mechanics due to hip AVN  -worse the last 2-3 months  -pain with varus/valgus strain  -left knee x-ray  -minimal edema. Worse with weight bearing.  -proceed with knee injection today. Risks, benefits, and alternatives were discussed with the patient, and He would like to proceed.  -He has failed PT in the knee before.  -He uses a knee brace.    Avascular necrosis of the left hip   -s/p hip block without significant pain relief and complicated by Hematoma.   -tolerating oxycodone 5 mg BID PRN (he thinks his lucid dreams were due to EtOH withdrawal). No more lucid dreams. Switch back to tramadol  -switched back to tramadol because he has been doing better and does not think that he needs the oxycodone anymore.  -refill Tramadol QID. This has been working for him. Helps him function. Refill x3 months  - Medrol dose pack, helped while taking, but not sustained.  -not surgical  candidate  -Not a candidate for further procedures due to bleeding risk  - Plts improved to ~110.  -buprenorphine likely not an option 2/2 to his ESLD and liver transplant status    Right hip pain  -XR Right hip given hx of avascular necrosis in the left hip. This showed moderate OA in the hip  9/28/23 - right hip injection - >50% relief for 6 months  -recommended against any repeat due to risks unless the pain becomes unbearable.    Allodynia  -stopped Nortriptyline to 50 mg nightly. Helps him sleep, but made him nauseous    Thorombocytopenia 2/2 cirrhosis  -platelets are 92 on 5/26/22, 82 on 9/7/22, 74 on 10/25/22, 110 on 5/18/23  -Avanos recommended above 50, but he had hematoma, so will not proceed.    Chronic Opioid Use  - UDS from October and September did not show opioids but he states he was decreasing use at that time.  -no UDS today, He had a drug screen 05/2023 which was appropriate.  -new UDS 6/20/24 since the one in March 2024 did not show tramadol    Cirrhosis 2/2 past EtOH. He states he is not drinking anymore.  -following with hepatology  -possible liver transplant?  -working with psychiatry for EtOH.    Kidney disease  -GFR 54    - RTC before 6/16/24  - Counseled patient regarding the importance of  activity modification.    The above plan and management options were discussed at length with patient. Patient is in agreement with the above and verbalized understanding. It will be communicated with the referring physician via electronic record, fax, or mail.  Lab/study reports reviewed were important and necessary because subsequent medical and treatment recommendations required review of the above lab/study reports. Images viewed/reviewed above were important and necessary because subsequent medical and treatment recommendations required review of the reviewed image(s).     Electronically signed by:  Robbin De La Garza  DO  06/20/2024    =========================================================================================================    SUBJECTIVE:    Interval History 6/20/2024:    is a 48 y.o. male presents to the clinic for follow up.  Since last visit the pain has is unchanged.    The pain is located in the left Hip area and radiates to the left knee and ankles.  The pain is described as aching, shooting, and stabbing    At BEST  8/10   At WORST  10/10 on the WORST day.    On average pain is rated as 8/10.   Today the pain is rated as 8/10  Symptoms interfere with daily activity and sleeping.   Exacerbating factors: Sitting, Standing, Bending, and Flexing.    Mitigating factors nothing.     Current pain medications: Tramadol 50mg QID  Failed Pain Medications: oxycodone, tramadol, gabapentin, cannot take NSAIDs due to gastric bleeding, cannot take tylenol due to liver failure.     Pain procedures:  6/24/22 - left hip block - no relief. C/b hematoma formation  10/10/23 - Right hip injection - oral sed - no AC - >50% @ 6m    Interval History 3/18/2024:   Irvin Diaz Jr. is a 48 y.o. male presents to the clinic for follow up.  Since last visit the pain has has worsened. He has been having to travel to [place] a lot recently due to his son-in-law getting in to a MVA and helping out. His right hip has been bothering him more recently    The pain is located in the groin area and radiates to the right hip, left knee and ankles .  The pain is described as aching    At BEST  8/10   At WORST  10/10 on the WORST day.    On average pain is rated as 8/10.   Today the pain is rated as 8/10  Symptoms interfere with daily activity and sleeping.   Exacerbating factors: Sitting, Standing, Bending, and Flexing.    Mitigating factors medications.     Interval History 9/13/2023:   Irvin NINA Diaz Jr. is a 48 y.o. male presents to the clinic for follow up.  Since last visit the pain has has worsened.    The pain is located in the  groin, left knee, and both ankles area and radiates to the knee and ankle .  The pain is described as aching    At BEST  8/10   At WORST  10/10 on the WORST day.    On average pain is rated as 8/10.   Today the pain is rated as 8/10  Symptoms interfere with daily activity and sleeping.   Exacerbating factors: Sitting, Standing, Bending, Walking, and Flexing.    Mitigating factors medications.     Interval History 6/21/2023:   Irvin Diaz Jr. is a 48 y.o. male presents to the clinic for follow up.  Since last visit the pain has has improved in some ways and worsened in others. The right hip has been bothering him more now. States that his right leg is clicking.     The pain is located in the left hip area and radiates to the right hip and down to his left knee .  The pain is described as aching and throbbing    At BEST  7/10   At WORST  9/10 on the WORST day.    On average pain is rated as 8/10.   Today the pain is rated as 8/10  Symptoms interfere with daily activity.   Exacerbating factors: Bending.    Mitigating factors medications (tramadol not oxycodone).     Interval History 1/25/2023:     Irvin Diaz Jr. is a 48 y.o. male presents to the clinic for follow up.  Since last visit the pain has has moderately improved. Reports that he went on a cruise and noticed his pain was doing better.  He has stopped using the cane because it has been better.  He went back to tramadol because he did not think he required the oxycodone. Pain 8/10 today. Feels like a tolerable 8.    Interval History 10/31/2022:     Irvin Diaz Jr. is a 48 y.o. male presents to the clinic for follow up.  Since last visit the pain has has significantly worsened.    The pain is located in the L hip area and radiates to the L foot .  The pain is described as aching, dull, shooting, stabbing, tight band, and tingling    At BEST  8/10   At WORST  10/10 on the WORST day.    On average pain is rated as 7/10.   Today the pain is  rated as 8/10  Symptoms interfere with daily activity, sleeping, and work.   Exacerbating factors: Standing, Bending, Coughing/Sneezing, Walking, and Getting out of bed/chair.    Mitigating factors nothing and medications.     Interval History 9/7/2022:     Irvin Diaz Jr. is a 48 y.o. male presents to the clinic for follow up.  Since last visit the pain has has worsened. He is now using a walker full time for the last 2 weeks. In a wheelchair today. He is unable to describe if his limited mobility is due to just pain or maybe a component of weakness.     The pain is located in the L hip area and radiates to the L foot .  The pain is described as aching, shooting, and stabbing    At BEST  7/10   At WORST  10/10 on the WORST day.    On average pain is rated as 8/10.   Today the pain is rated as 10/10  Symptoms interfere with daily activity, sleeping, and work.   Exacerbating factors: Standing, Laying, Bending, Walking, Lifting, and Getting out of bed/chair.    Mitigating factors none.     Interval History 8/2/2022:     Irvin Diaz Jr. is a 48 y.o. male presents to the clinic for follow up.  Since last visit the pain has is unchanged.  He stopped the Oxycodone because it gave severe, lucid dreams.  Ran out of tramadol and has not been taking anything for his pain.    The pain is located in the left hip area and radiates to the left knee/ groin .  The pain is described as aching, shooting and stabbing    At BEST  8/10   At WORST  10/10 on the WORST day.    On average pain is rated as 8/10.   Today the pain is rated as 8/10  Symptoms interfere with daily activity, sleeping and work.   Exacerbating factors: Standing, Walking and Getting out of bed/chair.    Mitigating factors nothing, heat, ice, medications and rest.     Interval History 7/5/2022:     Irvin Diaz Jr. is a 48 y.o. male presents to the clinic for follow up.  Since last visit the pain has is unchanged.  He is s/p hip block that was  complicated by hematoma in the pectineus muscle.  His Hgb has dropped, although the hematoma is resolving.  Recommended that patient go to ED as recommended by transplant.    The pain is located in the left hip area and radiates to the left leg/ groin  .  The pain is described as aching, shooting and stabbing    At BEST  8/10   At WORST  10/10 on the WORST day.    On average pain is rated as 8/10.   Today the pain is rated as 8/10  Symptoms interfere with daily activity, sleeping and work.   Exacerbating factors: Standing, Walking and Getting out of bed/chair.    Mitigating factors nothing, heat, ice, medications and rest.     Interval History 5/30/2022:     Irvin Diaz Jr. is a 48 y.o. male presents to the clinic for follow up.  Since last visit the pain has is unchanged. He missed his original procedure date due to not checking his voicemail and transportation issues. He is still interested in the procedure.  WE had a long talk about bleeding risk with his low platelets and high INR.    The pain is located in the left hip area and radiates to the left leg/groin .  The pain is described as aching, shooting and stabbing    At BEST  8/10   At WORST  10/10 on the WORST day.    On average pain is rated as 8/10.   Today the pain is rated as 8/10  Symptoms interfere with daily activity, sleeping and work.   Exacerbating factors: walking.    Mitigating factors nothing.     Initial Hx:  Irvin Diaz Jr. is a 48 y.o. male presents to the clinic for the evaluation of left hip pain. The pain started 3 years ago following fall and symptoms have been worsening. The patient states used to get hip injections of the left hip.  He is unable to walk without a cane.  He states that he was told after imaging that he had a fracture in 2019.  He had an injection (and aspiration) in December/january and it helps the pain for about 3 weeks.  After the injections he still needs the pain but it helps.  The ortho physician at  [facility]    CT note about the hip:  There is collapse of the left femoral head superior portion with bone-on-bone as well as underlying femoral sclerotic changes suggesting AVN with severe secondary degenerative changes of the acetabulum and the left hip effusion stable since prior exam.     The patient says that he has seen a ortho surgeon in the past and that they are unable to do surgery due to his liver failure.    Pain Description:    The pain is located in the left hip area and radiates to the left leg/ groin area .    At BEST  8/10   At WORST  10/10 on the WORST day.    On average pain is rated as 8/10.   Today the pain is rated as 8/10  The pain is continuous.  The pain is described as aching, shooting and throbbing    Symptoms interfere with daily activity, sleeping and work.   Exacerbating factors: Standing, Laying, Bending, Walking, Night Time, Morning, Flexing, Lifting and Getting out of bed/chair.    Mitigating factors laying down and medications.   He reports 5 hours of sleep per night.    Physical Therapy/Home Exercise: No, not currently in physical therapy or home exercise program    Current Pain Medications:    - none    Failed Pain Medications:    - cannot take NSAIDs due to gastric bleeding, cannot take tylenol due to liver failure.     Pain Treatment Therapies:    Pain procedures: hip injections  Physical Therapy: physical therapy made things worse  Chiropractor: none  Acupuncture: none  TENS unit: none  Spinal decompression: none  Joint replacement: none    Patient denies urinary incontinence, bowel incontinence and loss of sensations. (+) weakness in the left leg  Patient denies any suicidal or homicidal ideations     report:  Reviewed and consistent with medication use as prescribed.    Imaging:   XR abdomen 03/2022:  Please note the hemidiaphragms are not included in their entirety.  Multiple surgical clips and presumed anastomotic suture line project over the right  abdomen.  There are a few mildly prominent loops of gas-filled small bowel loops present measuring up to 3.3 cm in the left abdomen.  Limited evaluation of free intraperitoneal air to patient positioning/technique.  Calcifications project over the pelvis, likely phleboliths.  Osseous structures demonstrate significant degenerative change of the left hip with chronic appearing deformity/collapse of the left femoral head.    CT abdomen 03/2022:  Mild circumferential wall thickening involving the proximal colon extending from the ileocolic anastomosis at the hepatic flexure through around the level of the splenic flexure suggests inflammatory or infectious colitis.  No convincing transmural inflammation is seen.     Postoperative changes of proximal colectomy and scattered mild diverticulosis of the more distal colon.  No convincing diverticulitis, bowel obstruction, free air or abscess.     Findings of cirrhosis and mild abdominal ascites as described essentially stable.     Left hip findings suggesting AVN as described.     This report was flagged in Epic as abnormal.     No other significant or acute findings.       Past Medical History:   Diagnosis Date    Alcohol withdrawal 5/1/2022    Alcoholic cirrhosis of liver     Alcoholic ketoacidosis 6/6/2021    Arthritis     ATN (acute tubular necrosis)     CHF (congestive heart failure)     Diverticulitis 01/2020    Esophageal varices     GERD (gastroesophageal reflux disease)     GI bleed     Hip arthritis     Left    Hypertension     Liver cirrhosis     Macrocytic anemia     Pulmonary embolism 08/2018    Unprovoked DVT.  Stop Coumadin due to GIB    Thrombocytopenia      Past Surgical History:   Procedure Laterality Date    ANKLE SURGERY      BLOCK, NERVE, PERIPHERAL Left 06/24/2022    Procedure: Left Hip femoral-obturator accessory nerve block;  Surgeon: Robbin De La Garza DO;  Location: [facility];  Service: Pain Management;  Laterality: Left;    COLON SURGERY  2007     COLONOSCOPY  09/05/2019    [facility]    COLONOSCOPY N/A 02/17/2021    Procedure: COLONOSCOPY;  Surgeon: Renea Billingsley MD;  Location: [facility];  Service: Endoscopy;  Laterality: N/A;    COLONOSCOPY N/A 2/13/2023    Procedure: COLONOSCOPY;  Surgeon: Rudy Orellana MD;  Location: [facility] (4TH FLR);  Service: Endoscopy;  Laterality: N/A;  cirrhosis-labs done on 1/11/23  instr portal-GT  No answer for precall- [place]    COLONOSCOPY N/A 3/10/2023    Procedure: COLONOSCOPY;  Surgeon: Rudy Orellana MD;  Location: [facility] (4TH FLR);  Service: Endoscopy;  Laterality: N/A;  inst via poral per pt request    COLONOSCOPY W/ BIOPSIES  02/17/2021    ESOPHAGOGASTRODUODENOSCOPY N/A 07/26/2019    Procedure: EGD (ESOPHAGOGASTRODUODENOSCOPY);  Surgeon: Brian Trivedi MD;  Location: [facility];  Service: Endoscopy;  Laterality: N/A;    ESOPHAGOGASTRODUODENOSCOPY N/A 04/08/2020    Procedure: EGD (ESOPHAGOGASTRODUODENOSCOPY);  Surgeon: Donn Acuna MD;  Location: [facility];  Service: Endoscopy;  Laterality: N/A;    ESOPHAGOGASTRODUODENOSCOPY N/A 01/03/2021    Procedure: EGD (ESOPHAGOGASTRODUODENOSCOPY)- coffee ground emesis, hx varices;  Surgeon: Steve Chairez MD;  Location: [facility];  Service: Endoscopy;  Laterality: N/A;    ESOPHAGOGASTRODUODENOSCOPY N/A 05/03/2021    Procedure: EGD (ESOPHAGOGASTRODUODENOSCOPY);  Surgeon: Jeanmarie Ngo MD;  Location: [facility];  Service: Endoscopy;  Laterality: N/A;    ESOPHAGOGASTRODUODENOSCOPY N/A 07/19/2021    Procedure: ESOPHAGOGASTRODUODENOSCOPY (EGD);  Surgeon: Claudio Medeiros MD;  Location: [facility];  Service: Endoscopy;  Laterality: N/A;    ESOPHAGOGASTRODUODENOSCOPY  12/20/2021    ESOPHAGOGASTRODUODENOSCOPY N/A 12/20/2021    Procedure: EGD (ESOPHAGOGASTRODUODENOSCOPY);  Surgeon: Ajay Jackson MD;  Location: [facility];  Service: Endoscopy;  Laterality: N/A;    ESOPHAGOGASTRODUODENOSCOPY N/A 05/03/2022    Procedure: EGD (ESOPHAGOGASTRODUODENOSCOPY);  Surgeon: Brian PRIETO  MD Farooq;  Location: [facility];  Service: Endoscopy;  Laterality: N/A;    ESOPHAGOGASTRODUODENOSCOPY N/A 2022    Procedure: EGD (ESOPHAGOGASTRODUODENOSCOPY);  Surgeon: Ajay Jackson MD;  Location: [facility];  Service: Endoscopy;  Laterality: N/A;    ESOPHAGOGASTRODUODENOSCOPY N/A 2022    Procedure: EGD (ESOPHAGOGASTRODUODENOSCOPY);  Surgeon: Edouard Maynard MD;  [facility]  Service: Endoscopy;  Laterality: N/A;    ESOPHAGOGASTRODUODENOSCOPY N/A 2023    Procedure: EGD (ESOPHAGOGASTRODUODENOSCOPY);  Surgeon: Caesar Dickens MD;  Location: [facility];  Service: Endoscopy;  Laterality: N/A;    ESOPHAGOGASTRODUODENOSCOPY N/A 3/28/2024    Procedure: EGD (ESOPHAGOGASTRODUODENOSCOPY);  Surgeon: Jeanmarie Ngo MD;  Location: [facility];  Service: Endoscopy;  Laterality: N/A;    HERNIA REPAIR Left     Inguinal    INJECTION OF JOINT Right 2023    Procedure: RT Hip Injection;  Surgeon: Robbin De La Garza DO;  Location: [facility];  Service: Pain Management;  Laterality: Right;  oral - 20 mins    UPPER GASTROINTESTINAL ENDOSCOPY N/A 2022     Social History     Socioeconomic History    Marital status:    Tobacco Use    Smoking status: Former     Current packs/day: 0.00     Types: Cigarettes     Quit date: 2000     Years since quittin.4    Smokeless tobacco: Never    Tobacco comments:     quit 20 years ago   Substance and Sexual Activity    Alcohol use: Not Currently     Comment: Quit drinking 21    Drug use: Not Currently    Sexual activity: Yes     Comment: occ     Social Determinants of Health     Financial Resource Strain: Low Risk  (2024)    Overall Financial Resource Strain (CARDIA)     Difficulty of Paying Living Expenses: Not hard at all   Food Insecurity: No Food Insecurity (2024)    Hunger Vital Sign     Worried About Running Out of Food in the Last Year: Never true     Ran Out of Food in the Last Year: Never true    Transportation Needs: No Transportation Needs (4/22/2024)    PRAPARE - Transportation     Lack of Transportation (Medical): No     Lack of Transportation (Non-Medical): No   Physical Activity: Inactive (4/22/2024)    Exercise Vital Sign     Days of Exercise per Week: 0 days     Minutes of Exercise per Session: 0 min   Stress: No Stress Concern Present (4/22/2024)    [employer] [place] [employer] - Occupational Stress Questionnaire     Feeling of Stress : Not at all   Housing Stability: Unknown (4/22/2024)    Housing Stability Vital Sign     Unable to Pay for Housing in the Last Year: No     Family History   Problem Relation Name Age of Onset    Hypertension Mother      Breast cancer Mother      Hypertension Father      Prostate cancer Father      Bladder Cancer Father         Review of patient's allergies indicates:   Allergen Reactions    Ciprofloxacin hcl Hallucinations    Meperidine Hives     Pt medicated w/multiple medications (demerol, protonix, and zofran)  w/i 10 mins time frame prior to developing localized hives near IV site that med was administered. Pt reports he has/takes all other medications on daily basis.     Morphine Itching    Nsaids (non-steroidal anti-inflammatory drug)      Kidney Disease    Tylenol [acetaminophen]      Hx of liver disease       Current Outpatient Medications   Medication Sig    calcitRIOL (ROCALTROL) 0.25 MCG Cap Take 1 capsule (0.25 mcg total) by mouth once daily. (Patient not taking: Reported on 4/22/2024)    ferrous gluconate (FERGON) 240 (27 FE) MG tablet Take 2 tablets (480 mg total) by mouth 2 (two) times daily with meals. (Patient not taking: Reported on 4/22/2024)    folic acid (FOLVITE) 1 MG tablet Take 1 tablet (1 mg total) by mouth once daily. (Patient not taking: Reported on 6/6/2024)    metoprolol tartrate (LOPRESSOR) 50 MG tablet Take 1 tablet (50 mg total) by mouth 2 (two) times daily. Do not take if heart rate is less than 60    rifAXIMin (XIFAXAN)  550 mg Tab Take 1 tablet (550 mg total) by mouth 2 (two) times daily. (Patient not taking: Reported on 6/6/2024)    spironolactone (ALDACTONE) 25 MG tablet Take 1 tablet (25 mg total) by mouth once daily. (Patient not taking: Reported on 6/12/2024)    tadalafiL (CIALIS) 20 MG Tab Take 1 tablet (20 mg total) by mouth daily as needed (erectile dysfunction).    thiamine 100 MG tablet Take 1 tablet (100 mg total) by mouth once daily. (Patient not taking: Reported on 6/6/2024)    [START ON 6/30/2024] traMADoL (ULTRAM) 50 mg tablet Take 1 tablet (50 mg total) by mouth 4 (four) times daily as needed for Pain.     Current Facility-Administered Medications   Medication    triamcinolone acetonide injection 40 mg       REVIEW OF SYSTEMS:    GENERAL:  No weight loss, malaise or fevers.   HEENT:   No recent changes in vision or hearing   NECK:  Negative for lumps, no difficulty with swallowing.  RESPIRATORY:  Negative for cough, wheezing or shortness of breath, patient denies any recent URI.  CARDIOVASCULAR:  Negative for chest pain, leg swelling or palpitations.  GI:  Negative for abdominal discomfort, blood in stools or black stools or change in bowel habits.  MUSCULOSKELETAL:  See HPI.  SKIN:  Negative for lesions, rash, and itching. + skin itching  PSYCH:  No mood disorder or recent psychosocial stressors.  Patients sleep is not disturbed secondary to pain.  HEMATOLOGY/LYMPHOLOGY:  Negative for prolonged bleeding, bruising easily or swollen nodes.  Patient is not currently taking any anti-coagulants  NEURO:   No history of headaches, syncope, paralysis, seizures or tremors.  All other reviewed and negative other than HPI.    OBJECTIVE:    BP (!) 143/78 (BP Location: Left arm, Patient Position: Sitting, BP Method: Large (Automatic))   Pulse 71   Ht 6' (1.829 m)   Wt 102.3 kg (225 lb 8.5 oz)   BMI 30.59 kg/m²     PHYSICAL EXAMINATION:    GENERAL: Fraile, in no acute distress, alert and oriented x3.  PSYCH:  Mood and affect  appropriate.  SKIN: Skin color, texture, turgor normal, no rashes or lesions on visible skin.  HEAD/FACE:  Normocephalic, atraumatic. Cranial nerves grossly intact.  CV: RRR with palpation of the radial artery.  PULM: CTAB. No evidence of respiratory difficulty, symmetric chest rise.  GI:  Soft    MUSKULOSKELETAL:    EXTREMITIES:   Left Hip Exam (limited ROM and exam 2/2/ pain)  - Log Roll Positive  - FADIR Positive  - [place] Unable to Perform  - Hip Scour Unable to Perform  - GTB Tenderness Positive   -TTP over anterior hip joint. Posterior not palpated  - TTP of the superior knee joint.    Right hip exam:  (+) log roll  (+) fadir  (+) TTP of the right groin    Left knee:  TTP right medial knee  (-) edema  (-) crepitus  (+) pain with end ROM    MUSCULOSKELETAL:  Atrophy of the left leg compared to the right  No deformities, edema, or skin discoloration are noted on visible skin. Good capillary refill.     NEURO: Bilateral upper and lower extremity coordination and muscle stretch reflexes are physiologic and symmetric.      NEUROLOGICAL EXAM:  MENTAL STATUS: A x O x 3, good concentration, speech is fluent and goal directed  MEMORY: recent and remote are intact  CN: CN2-12 grossly intact  MOTOR: 5/5 in all muscle groups on the right. HF 3/5 on the left, 4/5 KE, 4/5 KF with pain  DTRs: 3+ intact symmetric patella and 2 + achilles  Sensation:    -yes Loss of sensation in a left lower L-1 and L-2 on the left distribution.  Babinski: absent     Gait: Cane, abnormal. Short stride. Favors left leg

## 2024-06-21 ENCOUNTER — TELEPHONE (OUTPATIENT)
Dept: TRANSPLANT | Facility: CLINIC | Age: 49
End: 2024-06-21
Payer: MEDICARE

## 2024-06-21 DIAGNOSIS — Z76.82 AWAITING LIVER TRANSPLANT: Primary | ICD-10-CM

## 2024-06-21 NOTE — TELEPHONE ENCOUNTER
Called pt to discuss continuing care with Dr. Arreaga in transplant. Discussed with pt all the appointments that were previously missed. Pt agreed to come in to see Dr. Arreaga and discuss if moving forward with transplant is an option. Appt card given for pt to RTC and labs with SHIRA.

## 2024-06-29 ENCOUNTER — HOSPITAL ENCOUNTER (EMERGENCY)
Facility: HOSPITAL | Age: 49
Discharge: HOME | End: 2024-06-29
Attending: EMERGENCY MEDICINE
Payer: MEDICARE

## 2024-06-29 ENCOUNTER — NURSE TRIAGE (OUTPATIENT)
Dept: ADMINISTRATIVE | Facility: CLINIC | Age: 49
End: 2024-06-29
Payer: MEDICARE

## 2024-06-29 VITALS
BODY MASS INDEX: 31.15 KG/M2 | TEMPERATURE: 99 F | HEIGHT: 72 IN | WEIGHT: 230 LBS | DIASTOLIC BLOOD PRESSURE: 79 MMHG | HEART RATE: 80 BPM | SYSTOLIC BLOOD PRESSURE: 167 MMHG | OXYGEN SATURATION: 99 % | RESPIRATION RATE: 18 BRPM

## 2024-06-29 DIAGNOSIS — R60.9 SWELLING: ICD-10-CM

## 2024-06-29 DIAGNOSIS — M79.89 LEG SWELLING: Primary | ICD-10-CM

## 2024-06-29 LAB
ALBUMIN SERPL BCP-MCNC: 3.5 G/DL (ref 3.5–5.2)
ALP SERPL-CCNC: 82 U/L (ref 55–135)
ALT SERPL W/O P-5'-P-CCNC: 46 U/L (ref 10–44)
ANION GAP SERPL CALC-SCNC: 11 MMOL/L (ref 8–16)
AST SERPL-CCNC: 60 U/L (ref 10–40)
BASOPHILS # BLD AUTO: 0.02 K/UL (ref 0–0.2)
BASOPHILS NFR BLD: 0.3 % (ref 0–1.9)
BILIRUB SERPL-MCNC: 4.5 MG/DL (ref 0.1–1)
BNP SERPL-MCNC: 186 PG/ML (ref 0–99)
BUN SERPL-MCNC: 17 MG/DL (ref 6–20)
CALCIUM SERPL-MCNC: 8.6 MG/DL (ref 8.7–10.5)
CHLORIDE SERPL-SCNC: 106 MMOL/L (ref 95–110)
CO2 SERPL-SCNC: 20 MMOL/L (ref 23–29)
CREAT SERPL-MCNC: 1.5 MG/DL (ref 0.5–1.4)
DIFFERENTIAL METHOD BLD: ABNORMAL
EOSINOPHIL # BLD AUTO: 0 K/UL (ref 0–0.5)
EOSINOPHIL NFR BLD: 0.7 % (ref 0–8)
ERYTHROCYTE [DISTWIDTH] IN BLOOD BY AUTOMATED COUNT: 19.4 % (ref 11.5–14.5)
EST. GFR  (NO RACE VARIABLE): 56.7 ML/MIN/1.73 M^2
GLUCOSE SERPL-MCNC: 128 MG/DL (ref 70–110)
HCT VFR BLD AUTO: 36.1 % (ref 40–54)
HGB BLD-MCNC: 12.2 G/DL (ref 14–18)
IMM GRANULOCYTES # BLD AUTO: 0.05 K/UL (ref 0–0.04)
IMM GRANULOCYTES NFR BLD AUTO: 0.8 % (ref 0–0.5)
LYMPHOCYTES # BLD AUTO: 0.8 K/UL (ref 1–4.8)
LYMPHOCYTES NFR BLD: 14 % (ref 18–48)
MCH RBC QN AUTO: 37.7 PG (ref 27–31)
MCHC RBC AUTO-ENTMCNC: 33.8 G/DL (ref 32–36)
MCV RBC AUTO: 111 FL (ref 82–98)
MONOCYTES # BLD AUTO: 0.5 K/UL (ref 0.3–1)
MONOCYTES NFR BLD: 7.6 % (ref 4–15)
NEUTROPHILS # BLD AUTO: 4.6 K/UL (ref 1.8–7.7)
NEUTROPHILS NFR BLD: 76.6 % (ref 38–73)
NRBC BLD-RTO: 0 /100 WBC
PLATELET # BLD AUTO: 64 K/UL (ref 150–450)
PMV BLD AUTO: 11.6 FL (ref 9.2–12.9)
POTASSIUM SERPL-SCNC: 3.5 MMOL/L (ref 3.5–5.1)
PROT SERPL-MCNC: 7.9 G/DL (ref 6–8.4)
RBC # BLD AUTO: 3.24 M/UL (ref 4.6–6.2)
SODIUM SERPL-SCNC: 137 MMOL/L (ref 136–145)
WBC # BLD AUTO: 6.02 K/UL (ref 3.9–12.7)

## 2024-06-29 PROCEDURE — 83880 ASSAY OF NATRIURETIC PEPTIDE: CPT

## 2024-06-29 PROCEDURE — 80053 COMPREHEN METABOLIC PANEL: CPT

## 2024-06-29 PROCEDURE — 96374 THER/PROPH/DIAG INJ IV PUSH: CPT

## 2024-06-29 PROCEDURE — 99285 EMERGENCY DEPT VISIT HI MDM: CPT | Mod: 25

## 2024-06-29 PROCEDURE — 63600175 PHARM REV CODE 636 W HCPCS

## 2024-06-29 PROCEDURE — 85025 COMPLETE CBC W/AUTO DIFF WBC: CPT

## 2024-06-29 RX ORDER — FUROSEMIDE 20 MG/1
20 TABLET ORAL 2 TIMES DAILY
Qty: 6 TABLET | Refills: 0 | Status: SHIPPED | OUTPATIENT
Start: 2024-06-29 | End: 2024-07-02

## 2024-06-29 RX ORDER — HYDROMORPHONE HYDROCHLORIDE 1 MG/ML
0.2 INJECTION, SOLUTION INTRAMUSCULAR; INTRAVENOUS; SUBCUTANEOUS
Status: COMPLETED | OUTPATIENT
Start: 2024-06-29 | End: 2024-06-29

## 2024-06-29 RX ADMIN — HYDROMORPHONE HYDROCHLORIDE 0.2 MG: 0.5 INJECTION, SOLUTION INTRAMUSCULAR; INTRAVENOUS; SUBCUTANEOUS at 08:06

## 2024-06-29 NOTE — TELEPHONE ENCOUNTER
Pt reports BLE swelling, right worse than left, x's 2 to 3 days. Hx of CHF and liver disease.No liver transplant yet. Pt reports some increased SOB with exertion.    Dispo is to go to ED/UC now. Recommended ED s/t resources available. Care advice given. Pt v/u.     Reason for Disposition   [1] Difficulty breathing with exertion (e.g., walking) AND [2] new-onset or getting worse    Additional Information   Negative: SEVERE difficulty breathing (e.g., struggling for each breath, speaks in single words)   Negative: Looks like a broken bone or dislocated joint (e.g., crooked or deformed)   Negative: Sounds like a life-threatening emergency to the triager   Negative: Difficulty breathing at rest   Negative: Entire foot is cool or blue in comparison to other side   Negative: [1] Can't walk or can barely walk AND [2] new-onset    Protocols used: Leg Swelling and Edema-A-AH

## 2024-06-30 NOTE — ED PROVIDER NOTES
Encounter Date: 6/29/2024       History     Chief Complaint   Patient presents with    Leg Swelling     His right leg is swollen and he does have a history of blood clots.      48-year-old male with HX of alcohol abuse and subsequent cirrhosis of the liver, arthritis, CHF, diverticulitis, esophageal varices, GERD, GI bleed, HTN and HX of DVT in right lower extremity presents this ER via private auto with chief concern of bilateral leg swelling.  Patient reports over the past 3 days he has had swelling in both of his legs but much more significant in his right.  Patient reports he came to the ER due to concern for recurrent DVT.  Patient reports he has otherwise been feeling well.  Patient reports he has not currently on blood thinners but has been in the past.  Patient denies chest pain shortness a breath nausea vomiting diarrhea.      Review of patient's allergies indicates:   Allergen Reactions    Ciprofloxacin hcl Hallucinations    Meperidine Hives     Pt medicated w/multiple medications (demerol, protonix, and zofran)  w/i 10 mins time frame prior to developing localized hives near IV site that med was administered. Pt reports he has/takes all other medications on daily basis.     Morphine Itching    Nsaids (non-steroidal anti-inflammatory drug)      Kidney Disease    Tylenol [acetaminophen]      Hx of liver disease     Past Medical History:   Diagnosis Date    Alcohol withdrawal 5/1/2022    Alcoholic cirrhosis of liver     Alcoholic ketoacidosis 6/6/2021    Arthritis     ATN (acute tubular necrosis)     CHF (congestive heart failure)     Diverticulitis 01/2020    Esophageal varices     GERD (gastroesophageal reflux disease)     GI bleed     Hip arthritis     Left    Hypertension     Liver cirrhosis     Macrocytic anemia     Pulmonary embolism 08/2018    Unprovoked DVT.  Stop Coumadin due to GIB    Thrombocytopenia      Past Surgical History:   Procedure Laterality Date    ANKLE SURGERY      BLOCK, NERVE,  PERIPHERAL Left 06/24/2022    Procedure: Left Hip femoral-obturator accessory nerve block;  Surgeon: Robbin De La Garza DO;  Location: [facility] OR;  Service: Pain Management;  Laterality: Left;    COLON SURGERY  2007    COLONOSCOPY  09/05/2019    [facility]    COLONOSCOPY N/A 02/17/2021    Procedure: COLONOSCOPY;  Surgeon: Renea Billingsley MD;  Location: [facility];  Service: Endoscopy;  Laterality: N/A;    COLONOSCOPY N/A 2/13/2023    Procedure: COLONOSCOPY;  Surgeon: Rudy Orellana MD;  [facility]  Service: Endoscopy;  Laterality: N/A;  cirrhosis-labs done on 1/11/23  instr portal-GT  No answer for precall- [place]    COLONOSCOPY N/A 3/10/2023    Procedure: COLONOSCOPY;  Surgeon: Rudy Orellana MD;  [facility]  Service: Endoscopy;  Laterality: N/A;  inst via poral per pt request    COLONOSCOPY W/ BIOPSIES  02/17/2021    ESOPHAGOGASTRODUODENOSCOPY N/A 07/26/2019    Procedure: EGD (ESOPHAGOGASTRODUODENOSCOPY);  Surgeon: Brian Trivedi MD;  Location: [facility];  Service: Endoscopy;  Laterality: N/A;    ESOPHAGOGASTRODUODENOSCOPY N/A 04/08/2020    Procedure: EGD (ESOPHAGOGASTRODUODENOSCOPY);  Surgeon: Donn cAuna MD;  Location: [facility];  Service: Endoscopy;  Laterality: N/A;    ESOPHAGOGASTRODUODENOSCOPY N/A 01/03/2021    Procedure: EGD (ESOPHAGOGASTRODUODENOSCOPY)- coffee ground emesis, hx varices;  Surgeon: Steve Chairez MD;  Location: [facility];  Service: Endoscopy;  Laterality: N/A;    ESOPHAGOGASTRODUODENOSCOPY N/A 05/03/2021    Procedure: EGD (ESOPHAGOGASTRODUODENOSCOPY);  Surgeon: Jeanmarie Ngo MD;  Location: [facility];  Service: Endoscopy;  Laterality: N/A;    ESOPHAGOGASTRODUODENOSCOPY N/A 07/19/2021    Procedure: ESOPHAGOGASTRODUODENOSCOPY (EGD);  Surgeon: Claudio Medeiros MD;  Location: [facility];  Service: Endoscopy;  Laterality: N/A;    ESOPHAGOGASTRODUODENOSCOPY  12/20/2021    ESOPHAGOGASTRODUODENOSCOPY N/A 12/20/2021    Procedure: EGD  (ESOPHAGOGASTRODUODENOSCOPY);  Surgeon: Ajay Jackson MD;  Location: [facility];  Service: Endoscopy;  Laterality: N/A;    ESOPHAGOGASTRODUODENOSCOPY N/A 2022    Procedure: EGD (ESOPHAGOGASTRODUODENOSCOPY);  Surgeon: Brian Trivedi MD;  Location: [facility];  Service: Endoscopy;  Laterality: N/A;    ESOPHAGOGASTRODUODENOSCOPY N/A 2022    Procedure: EGD (ESOPHAGOGASTRODUODENOSCOPY);  Surgeon: Ajay Jackson MD;  Location: [facility];  Service: Endoscopy;  Laterality: N/A;    ESOPHAGOGASTRODUODENOSCOPY N/A 2022    Procedure: EGD (ESOPHAGOGASTRODUODENOSCOPY);  Surgeon: Edouard Maynard MD;  Location: [facility] ([address] [place] [address]);  Service: Endoscopy;  Laterality: N/A;    ESOPHAGOGASTRODUODENOSCOPY N/A 2023    Procedure: EGD (ESOPHAGOGASTRODUODENOSCOPY);  Surgeon: Caesar Dickens MD;  Location: [facility];  Service: Endoscopy;  Laterality: N/A;    ESOPHAGOGASTRODUODENOSCOPY N/A 3/28/2024    Procedure: EGD (ESOPHAGOGASTRODUODENOSCOPY);  Surgeon: Jeanmarie Ngo MD;  Location: [facility];  Service: Endoscopy;  Laterality: N/A;    HERNIA REPAIR Left     Inguinal    INJECTION OF JOINT Right 2023    Procedure: RT Hip Injection;  Surgeon: Robbin De La Garza DO;  Location: [facility];  Service: Pain Management;  Laterality: Right;  oral - 20 mins    UPPER GASTROINTESTINAL ENDOSCOPY N/A 2022     Family History   Problem Relation Name Age of Onset    Hypertension Mother      Breast cancer Mother      Hypertension Father      Prostate cancer Father      Bladder Cancer Father       Social History     Tobacco Use    Smoking status: Former     Current packs/day: 0.00     Types: Cigarettes     Quit date:      Years since quittin.5    Smokeless tobacco: Never    Tobacco comments:     quit 20 years ago   Substance Use Topics    Alcohol use: Not Currently     Comment: Quit drinking 21    Drug use: Not Currently     Review of Systems   Constitutional:  Negative for chills,  fatigue, fever and unexpected weight change.   HENT:  Negative for congestion, hearing loss, rhinorrhea, sinus pressure, sinus pain and sore throat.    Eyes:  Negative for pain and visual disturbance.   Respiratory:  Negative for chest tightness and shortness of breath.    Cardiovascular:  Positive for leg swelling. Negative for chest pain.   Gastrointestinal:  Negative for abdominal pain, nausea and vomiting.   Endocrine: Negative for polydipsia, polyphagia and polyuria.   Genitourinary:  Negative for dysuria and hematuria.   Musculoskeletal:  Negative for gait problem, joint swelling and myalgias.   Skin:  Negative for rash and wound.   Allergic/Immunologic: Negative for environmental allergies and food allergies.   Neurological:  Negative for syncope and weakness.   Hematological:  Negative for adenopathy. Does not bruise/bleed easily.   Psychiatric/Behavioral:  Negative for confusion and hallucinations.        Physical Exam     Initial Vitals [06/29/24 1849]   BP Pulse Resp Temp SpO2   (!) 187/84 79 18 98.7 °F (37.1 °C) 97 %      MAP       --         Physical Exam    Nursing note and vitals reviewed.  Constitutional: He appears well-developed and well-nourished.   HENT:   Head: Normocephalic and atraumatic.   Right Ear: External ear normal.   Left Ear: External ear normal.   Mouth/Throat: Oropharynx is clear and moist.   Eyes: Conjunctivae and EOM are normal. Pupils are equal, round, and reactive to light.   Neck: Neck supple.   Normal range of motion.  Cardiovascular:  Normal rate and regular rhythm.           Pulmonary/Chest: Breath sounds normal.   Abdominal: Abdomen is soft. Bowel sounds are normal.   Musculoskeletal:         General: Normal range of motion.      Cervical back: Normal range of motion and neck supple.      Comments: BLE swelling, 1+ pitting edema on left side and 3+ pitting edema on right side.  See attached media.  No tenderness to palpation on either leg.  No erythema, warmth drainage  noted.  PMS intact in BLE.     Neurological: He is alert and oriented to person, place, and time. He has normal strength. GCS score is 15. GCS eye subscore is 4. GCS verbal subscore is 5. GCS motor subscore is 6.   Skin: Skin is warm and dry. Capillary refill takes less than 2 seconds.   Psychiatric: He has a normal mood and affect. Thought content normal.         ED Course   Procedures  Labs Reviewed   CBC W/ AUTO DIFFERENTIAL - Abnormal; Notable for the following components:       Result Value    RBC 3.24 (*)     Hemoglobin 12.2 (*)     Hematocrit 36.1 (*)      (*)     MCH 37.7 (*)     RDW 19.4 (*)     Platelets 64 (*)     Immature Granulocytes 0.8 (*)     Immature Grans (Abs) 0.05 (*)     Lymph # 0.8 (*)     Gran % 76.6 (*)     Lymph % 14.0 (*)     All other components within normal limits   COMPREHENSIVE METABOLIC PANEL - Abnormal; Notable for the following components:    CO2 20 (*)     Glucose 128 (*)     Creatinine 1.5 (*)     Calcium 8.6 (*)     Total Bilirubin 4.5 (*)     AST 60 (*)     ALT 46 (*)     eGFR 56.7 (*)     All other components within normal limits   B-TYPE NATRIURETIC PEPTIDE - Abnormal; Notable for the following components:     (*)     All other components within normal limits          Imaging Results              US Lower Extremity Veins Right (Final result)  Result time 06/29/24 20:03:45      Final result by Miquel Rodrigez DO (06/29/24 20:03:45)                   Impression:      No evidence of deep venous thrombosis in the right lower extremity.      Electronically signed by: Miquel Rodrigez  Date:    06/29/2024  Time:    20:03               Narrative:    EXAMINATION:  US LOWER EXTREMITY VEINS RIGHT    CLINICAL HISTORY:  Edema, unspecified    TECHNIQUE:  Duplex and color flow Doppler evaluation and graded compression of the right lower extremity veins was performed.    COMPARISON:  Lower extremity ultrasound 11/25/2022.    FINDINGS:  Right thigh veins: The common femoral,  femoral, popliteal, upper greater saphenous, and deep femoral veins are patent and free of thrombus. The veins are normally compressible and have normal phasic flow and augmentation response.    Right calf veins: The visualized calf veins are patent.    Contralateral CFV: The contralateral (left) common femoral vein is patent and free of thrombus.    Miscellaneous: There is soft tissue edema.                                       Medications   HYDROmorphone injection 0.2 mg (0.2 mg Intravenous Given 6/29/24 2014)     Medical Decision Making  Patient A/O x 3, GCS 15   Vital signs stable, patient in no acute distress.  Patient presents with BLE swelling, significantly worse on right side.. Physical exam notable for 1+ pitting edema on LLE in 3+ pitting edema on RLE.  Differential includes CHF, PE, DVT, venous stasis, cellulitis, lymphedema.. Ordered DVT U.S., CBC, CMP and BNP.  No DVT seen on U.S..  Labs unremarkable compared with previous.  At this time, low concern for DVT, CHF exacerbation or PE.  Symptoms likely attributed to venous stasis, lymphedema or other nonemergent cause.  Through shared decision making with patient and his wife, patient is stable for discharge at this time with short course of Lasix to take at home.  Patient was given very strict return precautions which the patient and his wife verbalized understanding of.      -DISPO  - patient discharged home in stable condition with diagnosis of leg swelling  - recommended supportive care and prescriptions for lasix given to patient       Amount and/or Complexity of Data Reviewed  Labs: ordered.    Risk  Prescription drug management.                                      Clinical Impression:  Final diagnoses:  [R60.9] Swelling  [M79.89] Leg swelling (Primary)          ED Disposition Condition    Discharge Stable          ED Prescriptions       Medication Sig Dispense Start Date End Date Auth. Provider    furosemide (LASIX) 20 MG tablet Take 1 tablet (20  mg total) by mouth 2 (two) times daily. for 3 days 6 tablet 6/29/2024 7/2/2024 Thuan Hernandez PA-C          Follow-up Information       Follow up With Specialties Details Why Contact Info Additional Information    Edouard Gibson MD Family Medicine, Hospitalist Schedule an appointment as soon as possible for a visit   [address] [address]  [phone]       [facility] - Emergency Dept Emergency Medicine  As needed [address] [facility] [address] [phone] 1st floor             Thuan Hernandez PA-C  06/29/24 7961

## 2024-07-01 ENCOUNTER — LAB VISIT (OUTPATIENT)
Dept: LAB | Facility: HOSPITAL | Age: 49
End: 2024-07-01
Payer: MEDICARE

## 2024-07-01 ENCOUNTER — TELEPHONE (OUTPATIENT)
Dept: CARDIOLOGY | Facility: HOSPITAL | Age: 49
End: 2024-07-01

## 2024-07-01 DIAGNOSIS — I47.20 VENTRICULAR TACHYCARDIA: ICD-10-CM

## 2024-07-01 DIAGNOSIS — E78.5 DYSLIPIDEMIA: ICD-10-CM

## 2024-07-01 PROBLEM — K92.2 GASTROINTESTINAL HEMORRHAGE: Status: RESOLVED | Noted: 2024-03-27 | Resolved: 2024-07-01

## 2024-07-01 LAB
ANION GAP SERPL CALC-SCNC: 8 MMOL/L (ref 8–16)
BUN SERPL-MCNC: 18 MG/DL (ref 6–20)
CALCIUM SERPL-MCNC: 9 MG/DL (ref 8.7–10.5)
CHLORIDE SERPL-SCNC: 103 MMOL/L (ref 95–110)
CHOLEST SERPL-MCNC: 167 MG/DL (ref 120–199)
CHOLEST/HDLC SERPL: 6 {RATIO} (ref 2–5)
CO2 SERPL-SCNC: 26 MMOL/L (ref 23–29)
CREAT SERPL-MCNC: 1.6 MG/DL (ref 0.5–1.4)
EST. GFR  (NO RACE VARIABLE): 52.5 ML/MIN/1.73 M^2
GLUCOSE SERPL-MCNC: 98 MG/DL (ref 70–110)
HDLC SERPL-MCNC: 28 MG/DL (ref 40–75)
HDLC SERPL: 16.8 % (ref 20–50)
LDLC SERPL CALC-MCNC: 128 MG/DL (ref 63–159)
NONHDLC SERPL-MCNC: 139 MG/DL
OHS QRS DURATION: 88 MS
OHS QTC CALCULATION: 477 MS
POTASSIUM SERPL-SCNC: 3.6 MMOL/L (ref 3.5–5.1)
SODIUM SERPL-SCNC: 137 MMOL/L (ref 136–145)
TRIGL SERPL-MCNC: 55 MG/DL (ref 30–150)

## 2024-07-01 PROCEDURE — 80048 BASIC METABOLIC PNL TOTAL CA: CPT

## 2024-07-01 PROCEDURE — 80061 LIPID PANEL: CPT

## 2024-07-01 PROCEDURE — 36415 COLL VENOUS BLD VENIPUNCTURE: CPT

## 2024-07-01 NOTE — TELEPHONE ENCOUNTER
Patient advised, [facility]   Will need to register on the first floor at the main entrance.   Patient advised that arrival time is 7:20am.  Patient advised that she may be here about 3.5-4 hours, and may want to bring something to occupy their time, as there will be periods of waiting.    Patient advised, may take her medications prior to testing if you need to.  Advised if she needs to eat to take her medications, please keep it light, like toast and juice.    Patient advised to avoid all caffeine 12 hours prior to testing.  This includes decaf tea and coffee.    Will provide peanut butter crackers for a snack after stress test.  If patient would prefer something else, please bring a snack from home.    Wear comfortable clothing.   No lotions, oils, or powders to the upper chest area. May wear deodorant.    No metal jewelry, buttons, or zippers to the upper body.  Patient verbalizes understanding of instructions.

## 2024-07-02 ENCOUNTER — HOSPITAL ENCOUNTER (OUTPATIENT)
Dept: CARDIOLOGY | Facility: HOSPITAL | Age: 49
Discharge: HOME | End: 2024-07-02
Payer: MEDICARE

## 2024-07-02 ENCOUNTER — HOSPITAL ENCOUNTER (OUTPATIENT)
Dept: RADIOLOGY | Facility: HOSPITAL | Age: 49
Discharge: HOME | End: 2024-07-02
Payer: MEDICARE

## 2024-07-02 ENCOUNTER — TELEPHONE (OUTPATIENT)
Dept: PRIMARY CARE CLINIC | Facility: CLINIC | Age: 49
End: 2024-07-02
Payer: MEDICARE

## 2024-07-02 ENCOUNTER — TELEPHONE (OUTPATIENT)
Dept: PAIN MEDICINE | Facility: CLINIC | Age: 49
End: 2024-07-02
Payer: MEDICARE

## 2024-07-02 DIAGNOSIS — I47.20 VENTRICULAR TACHYCARDIA: ICD-10-CM

## 2024-07-02 DIAGNOSIS — R94.31 ABNORMAL ELECTROCARDIOGRAM (ECG) (EKG): ICD-10-CM

## 2024-07-02 DIAGNOSIS — I10 HYPERTENSION, UNSPECIFIED TYPE: ICD-10-CM

## 2024-07-02 LAB
CV PHARM DOSE: 0.4 MG
CV STRESS BASE HR: 68 BPM
DIASTOLIC BLOOD PRESSURE: 81 MMHG
EJECTION FRACTION- HIGH: 65 %
END DIASTOLIC INDEX-HIGH: 153 ML/M2
END DIASTOLIC INDEX-LOW: 93 ML/M2
END SYSTOLIC INDEX-HIGH: 71 ML/M2
END SYSTOLIC INDEX-LOW: 31 ML/M2
NUC REST DIASTOLIC VOLUME INDEX: 175
NUC REST EJECTION FRACTION: 71
NUC REST SYSTOLIC VOLUME INDEX: 50
NUC STRESS DIASTOLIC VOLUME INDEX: 155
NUC STRESS EJECTION FRACTION: 72 %
NUC STRESS SYSTOLIC VOLUME INDEX: 44
OHS CV CPX 1 MINUTE RECOVERY HEART RATE: 84 BPM
OHS CV CPX 85 PERCENT MAX PREDICTED HEART RATE MALE: 145
OHS CV CPX MAX PREDICTED HEART RATE: 171
OHS CV CPX PATIENT IS FEMALE: 0
OHS CV CPX PATIENT IS MALE: 1
OHS CV CPX PEAK DIASTOLIC BLOOD PRESSURE: 83 MMHG
OHS CV CPX PEAK HEAR RATE: 84 BPM
OHS CV CPX PEAK RATE PRESSURE PRODUCT: NORMAL
OHS CV CPX PEAK SYSTOLIC BLOOD PRESSURE: 156 MMHG
OHS CV CPX PERCENT MAX PREDICTED HEART RATE ACHIEVED: 49
OHS CV CPX RATE PRESSURE PRODUCT PRESENTING: NORMAL
RETIRED EF AND QEF - SEE NOTES: 53 %
SYSTOLIC BLOOD PRESSURE: 160 MMHG

## 2024-07-02 PROCEDURE — 63600175 PHARM REV CODE 636 W HCPCS

## 2024-07-02 PROCEDURE — A9502 TC99M TETROFOSMIN: HCPCS

## 2024-07-02 PROCEDURE — 93016 CV STRESS TEST SUPVJ ONLY: CPT | Mod: ,,, | Performed by: NURSE PRACTITIONER

## 2024-07-02 PROCEDURE — 93018 CV STRESS TEST I&R ONLY: CPT | Mod: ,,, | Performed by: INTERNAL MEDICINE

## 2024-07-02 PROCEDURE — 78452 HT MUSCLE IMAGE SPECT MULT: CPT | Mod: 26,,, | Performed by: INTERNAL MEDICINE

## 2024-07-02 PROCEDURE — 78452 HT MUSCLE IMAGE SPECT MULT: CPT

## 2024-07-02 RX ORDER — REGADENOSON 0.08 MG/ML
0.4 INJECTION, SOLUTION INTRAVENOUS ONCE
Status: COMPLETED | OUTPATIENT
Start: 2024-07-02 | End: 2024-07-02

## 2024-07-02 RX ADMIN — TETROFOSMIN 26.7 MILLICURIE: 1.38 INJECTION, POWDER, LYOPHILIZED, FOR SOLUTION INTRAVENOUS at 09:07

## 2024-07-02 RX ADMIN — REGADENOSON 0.4 MG: 0.08 INJECTION, SOLUTION INTRAVENOUS at 09:07

## 2024-07-02 RX ADMIN — TETROFOSMIN 12.5 MILLICURIE: 1.38 INJECTION, POWDER, LYOPHILIZED, FOR SOLUTION INTRAVENOUS at 07:07

## 2024-07-03 ENCOUNTER — TELEPHONE (OUTPATIENT)
Dept: PAIN MEDICINE | Facility: CLINIC | Age: 49
End: 2024-07-03
Payer: MEDICARE

## 2024-08-09 ENCOUNTER — TELEPHONE (OUTPATIENT)
Dept: PAIN MEDICINE | Facility: CLINIC | Age: 49
End: 2024-08-09
Payer: MEDICARE

## 2024-08-12 ENCOUNTER — LAB VISIT (OUTPATIENT)
Dept: LAB | Facility: HOSPITAL | Age: 49
End: 2024-08-12
Attending: INTERNAL MEDICINE
Payer: MEDICARE

## 2024-08-12 DIAGNOSIS — Z76.82 AWAITING LIVER TRANSPLANT: ICD-10-CM

## 2024-08-12 LAB
ALBUMIN SERPL BCP-MCNC: 3.2 G/DL (ref 3.5–5.2)
ALP SERPL-CCNC: 74 U/L (ref 55–135)
ALT SERPL W/O P-5'-P-CCNC: 40 U/L (ref 10–44)
ANION GAP SERPL CALC-SCNC: 10 MMOL/L (ref 8–16)
AST SERPL-CCNC: 49 U/L (ref 10–40)
BASOPHILS # BLD AUTO: 0.02 K/UL (ref 0–0.2)
BASOPHILS NFR BLD: 0.4 % (ref 0–1.9)
BILIRUB SERPL-MCNC: 3.8 MG/DL (ref 0.1–1)
BUN SERPL-MCNC: 19 MG/DL (ref 6–20)
CALCIUM SERPL-MCNC: 9.6 MG/DL (ref 8.7–10.5)
CHLORIDE SERPL-SCNC: 107 MMOL/L (ref 95–110)
CO2 SERPL-SCNC: 21 MMOL/L (ref 23–29)
CREAT SERPL-MCNC: 2.1 MG/DL (ref 0.5–1.4)
DIFFERENTIAL METHOD BLD: ABNORMAL
EOSINOPHIL # BLD AUTO: 0.1 K/UL (ref 0–0.5)
EOSINOPHIL NFR BLD: 1.3 % (ref 0–8)
ERYTHROCYTE [DISTWIDTH] IN BLOOD BY AUTOMATED COUNT: 14.2 % (ref 11.5–14.5)
EST. GFR  (NO RACE VARIABLE): 37.9 ML/MIN/1.73 M^2
GLUCOSE SERPL-MCNC: 157 MG/DL (ref 70–110)
HCT VFR BLD AUTO: 36.9 % (ref 40–54)
HGB BLD-MCNC: 11.8 G/DL (ref 14–18)
IMM GRANULOCYTES # BLD AUTO: 0.06 K/UL (ref 0–0.04)
IMM GRANULOCYTES NFR BLD AUTO: 1.1 % (ref 0–0.5)
INR PPP: 1.3 (ref 0.8–1.2)
LYMPHOCYTES # BLD AUTO: 1.3 K/UL (ref 1–4.8)
LYMPHOCYTES NFR BLD: 23 % (ref 18–48)
MCH RBC QN AUTO: 35.6 PG (ref 27–31)
MCHC RBC AUTO-ENTMCNC: 32 G/DL (ref 32–36)
MCV RBC AUTO: 112 FL (ref 82–98)
MONOCYTES # BLD AUTO: 0.4 K/UL (ref 0.3–1)
MONOCYTES NFR BLD: 6.4 % (ref 4–15)
NEUTROPHILS # BLD AUTO: 3.7 K/UL (ref 1.8–7.7)
NEUTROPHILS NFR BLD: 67.8 % (ref 38–73)
NRBC BLD-RTO: 0 /100 WBC
PLATELET # BLD AUTO: 81 K/UL (ref 150–450)
PMV BLD AUTO: 10 FL (ref 9.2–12.9)
POTASSIUM SERPL-SCNC: 4.9 MMOL/L (ref 3.5–5.1)
PROT SERPL-MCNC: 8 G/DL (ref 6–8.4)
PROTHROMBIN TIME: 13.6 SEC (ref 9–12.5)
RBC # BLD AUTO: 3.31 M/UL (ref 4.6–6.2)
SODIUM SERPL-SCNC: 138 MMOL/L (ref 136–145)
WBC # BLD AUTO: 5.49 K/UL (ref 3.9–12.7)

## 2024-08-12 PROCEDURE — 85610 PROTHROMBIN TIME: CPT | Mod: TXP | Performed by: INTERNAL MEDICINE

## 2024-08-12 PROCEDURE — 85025 COMPLETE CBC W/AUTO DIFF WBC: CPT | Mod: TXP | Performed by: INTERNAL MEDICINE

## 2024-08-12 PROCEDURE — 80321 ALCOHOLS BIOMARKERS 1OR 2: CPT | Mod: TXP | Performed by: INTERNAL MEDICINE

## 2024-08-12 PROCEDURE — 80053 COMPREHEN METABOLIC PANEL: CPT | Mod: TXP | Performed by: INTERNAL MEDICINE

## 2024-08-12 PROCEDURE — 36415 COLL VENOUS BLD VENIPUNCTURE: CPT | Mod: TXP | Performed by: INTERNAL MEDICINE

## 2024-08-14 LAB
CLINICAL BIOCHEMIST REVIEW: NORMAL
PLPETH BLD-MCNC: <10 NG/ML
POPETH BLD-MCNC: <10 NG/ML

## 2024-08-14 NOTE — ED NOTES
"Pt has medications in his bag: lisinopril-hctz, carvedilol and methocarbamol. Pt reports he has not taken any "muscle relaxer" and reports taking both other pills.  " Yes - the patient is able to be screened

## 2024-08-19 ENCOUNTER — OFFICE VISIT (OUTPATIENT)
Dept: TRANSPLANT | Facility: CLINIC | Age: 49
End: 2024-08-19
Payer: MEDICARE

## 2024-08-19 ENCOUNTER — LAB VISIT (OUTPATIENT)
Dept: LAB | Facility: HOSPITAL | Age: 49
End: 2024-08-19
Attending: INTERNAL MEDICINE
Payer: MEDICARE

## 2024-08-19 VITALS
DIASTOLIC BLOOD PRESSURE: 66 MMHG | HEIGHT: 72 IN | HEART RATE: 83 BPM | TEMPERATURE: 97 F | BODY MASS INDEX: 30.13 KG/M2 | OXYGEN SATURATION: 100 % | SYSTOLIC BLOOD PRESSURE: 136 MMHG | WEIGHT: 222.44 LBS

## 2024-08-19 DIAGNOSIS — K74.60 CIRRHOSIS OF LIVER WITHOUT ASCITES, UNSPECIFIED HEPATIC CIRRHOSIS TYPE: ICD-10-CM

## 2024-08-19 DIAGNOSIS — K74.60 CIRRHOSIS OF LIVER WITHOUT ASCITES, UNSPECIFIED HEPATIC CIRRHOSIS TYPE: Primary | ICD-10-CM

## 2024-08-19 LAB
AFP SERPL-MCNC: 2.5 NG/ML (ref 0–8.4)
ALBUMIN SERPL BCP-MCNC: 3.1 G/DL (ref 3.5–5.2)
ALP SERPL-CCNC: 96 U/L (ref 55–135)
ALT SERPL W/O P-5'-P-CCNC: 37 U/L (ref 10–44)
ANION GAP SERPL CALC-SCNC: 6 MMOL/L (ref 8–16)
AST SERPL-CCNC: 52 U/L (ref 10–40)
BASOPHILS # BLD AUTO: 0.02 K/UL (ref 0–0.2)
BASOPHILS NFR BLD: 0.4 % (ref 0–1.9)
BILIRUB SERPL-MCNC: 3.1 MG/DL (ref 0.1–1)
BUN SERPL-MCNC: 22 MG/DL (ref 6–20)
CALCIUM SERPL-MCNC: 9.5 MG/DL (ref 8.7–10.5)
CHLORIDE SERPL-SCNC: 108 MMOL/L (ref 95–110)
CO2 SERPL-SCNC: 23 MMOL/L (ref 23–29)
CREAT SERPL-MCNC: 2 MG/DL (ref 0.5–1.4)
DIFFERENTIAL METHOD BLD: ABNORMAL
EOSINOPHIL # BLD AUTO: 0.1 K/UL (ref 0–0.5)
EOSINOPHIL NFR BLD: 1.9 % (ref 0–8)
ERYTHROCYTE [DISTWIDTH] IN BLOOD BY AUTOMATED COUNT: 13.8 % (ref 11.5–14.5)
EST. GFR  (NO RACE VARIABLE): 40.2 ML/MIN/1.73 M^2
GLUCOSE SERPL-MCNC: 97 MG/DL (ref 70–110)
HCT VFR BLD AUTO: 37 % (ref 40–54)
HGB BLD-MCNC: 12 G/DL (ref 14–18)
IMM GRANULOCYTES # BLD AUTO: 0.03 K/UL (ref 0–0.04)
IMM GRANULOCYTES NFR BLD AUTO: 0.5 % (ref 0–0.5)
INR PPP: 1.3 (ref 0.8–1.2)
LYMPHOCYTES # BLD AUTO: 1.2 K/UL (ref 1–4.8)
LYMPHOCYTES NFR BLD: 21.4 % (ref 18–48)
MCH RBC QN AUTO: 35.9 PG (ref 27–31)
MCHC RBC AUTO-ENTMCNC: 32.4 G/DL (ref 32–36)
MCV RBC AUTO: 111 FL (ref 82–98)
MONOCYTES # BLD AUTO: 0.5 K/UL (ref 0.3–1)
MONOCYTES NFR BLD: 9.2 % (ref 4–15)
NEUTROPHILS # BLD AUTO: 3.8 K/UL (ref 1.8–7.7)
NEUTROPHILS NFR BLD: 66.6 % (ref 38–73)
NRBC BLD-RTO: 0 /100 WBC
PLATELET # BLD AUTO: 80 K/UL (ref 150–450)
PMV BLD AUTO: 10.1 FL (ref 9.2–12.9)
POTASSIUM SERPL-SCNC: 4.4 MMOL/L (ref 3.5–5.1)
PROT SERPL-MCNC: 8.1 G/DL (ref 6–8.4)
PROTHROMBIN TIME: 13.6 SEC (ref 9–12.5)
RBC # BLD AUTO: 3.34 M/UL (ref 4.6–6.2)
SODIUM SERPL-SCNC: 137 MMOL/L (ref 136–145)
WBC # BLD AUTO: 5.66 K/UL (ref 3.9–12.7)

## 2024-08-19 PROCEDURE — 3078F DIAST BP <80 MM HG: CPT | Mod: CPTII,S$GLB,TXP, | Performed by: INTERNAL MEDICINE

## 2024-08-19 PROCEDURE — 85610 PROTHROMBIN TIME: CPT | Mod: TXP | Performed by: INTERNAL MEDICINE

## 2024-08-19 PROCEDURE — 82105 ALPHA-FETOPROTEIN SERUM: CPT | Mod: TXP | Performed by: INTERNAL MEDICINE

## 2024-08-19 PROCEDURE — 99215 OFFICE O/P EST HI 40 MIN: CPT | Mod: S$GLB,TXP,, | Performed by: INTERNAL MEDICINE

## 2024-08-19 PROCEDURE — 36415 COLL VENOUS BLD VENIPUNCTURE: CPT | Mod: TXP | Performed by: INTERNAL MEDICINE

## 2024-08-19 PROCEDURE — 3075F SYST BP GE 130 - 139MM HG: CPT | Mod: CPTII,S$GLB,TXP, | Performed by: INTERNAL MEDICINE

## 2024-08-19 PROCEDURE — 85025 COMPLETE CBC W/AUTO DIFF WBC: CPT | Mod: TXP | Performed by: INTERNAL MEDICINE

## 2024-08-19 PROCEDURE — 3044F HG A1C LEVEL LT 7.0%: CPT | Mod: CPTII,S$GLB,TXP, | Performed by: INTERNAL MEDICINE

## 2024-08-19 PROCEDURE — 3061F NEG MICROALBUMINURIA REV: CPT | Mod: CPTII,S$GLB,TXP, | Performed by: INTERNAL MEDICINE

## 2024-08-19 PROCEDURE — 3066F NEPHROPATHY DOC TX: CPT | Mod: CPTII,S$GLB,TXP, | Performed by: INTERNAL MEDICINE

## 2024-08-19 PROCEDURE — 99999 PR PBB SHADOW E&M-EST. PATIENT-LVL III: CPT | Mod: PBBFAC,TXP,, | Performed by: INTERNAL MEDICINE

## 2024-08-19 PROCEDURE — 3008F BODY MASS INDEX DOCD: CPT | Mod: CPTII,S$GLB,TXP, | Performed by: INTERNAL MEDICINE

## 2024-08-19 PROCEDURE — 80053 COMPREHEN METABOLIC PANEL: CPT | Mod: TXP | Performed by: INTERNAL MEDICINE

## 2024-08-19 NOTE — LETTER
August 19, 2024                      [facility] 1st Fl  [address]  [facility] [address]  Phone: [phone]   Patient: Irvin Diaz Jr.   MR Number: [id number]   YOB: 1975   Date of Visit: 8/19/2024       Dear       Thank you for referring Irvin Diaz to me for evaluation. Attached you will find relevant portions of my assessment and plan of care.    If you have questions, please do not hesitate to call me. I look forward to following Irvin Diaz along with you.    Sincerely,    Jacek Arreaga MD    Enclosure    If you would like to receive this communication electronically, please contact [email or web address] or [phone] to request [employer] access.    [employer] is a tool which provides read-only access to select patient information with whom you have a relationship. Its easy to use and provides real time access to review your patients record including encounter summaries, notes, results, and demographic information.    If you feel you have received this communication in error or would no longer like to receive these types of communications, please e-mail [email or web address]

## 2024-08-19 NOTE — PROGRESS NOTES
Subjective:       Patient ID: Irvin Diaz Jr. is a 48 y.o. male.    Chief Complaint: No chief complaint on file.      HPI  I saw this 48 y.o. 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  He came to clinic alone.    I last saw him in the liver clinic in Jan 2023.    He has had some minor variceal bleeds - been to [facility] in [id number].  - previously banded  - more recently he's had some leg edema and was started on diuretics.    Stopped drining in April 2024  - last PEth on 8/12/24 was negative    EGD: 3/28/24  Small (< 5 mm) varices were found in the lower third of the        esophagus.        A small post variceal banding scar was found in the lower third of        the esophagus.        The stomach was normal.        The examined duodenum was normal     No episodes of HE.    CT abdo: 5/13/23  No evidence for nephrolithiasis and/or hydronephrosis.  Cirrhotic liver with portal hypertension.  Minimal haziness throughout the peritoneum suggesting early changes of edema/ascites.  Status post right hemicolectomy.  Mild cardiomegaly.  Severe osteoarthritis left hip joint.      Alcohol hx:  - 1/5 of gin per day  - completed outpatient rehab at [facility] and managed to stay off alcohol for 5 months but relapsed a couple of times      No DUI  No legal troubles with alcohol    Completed [facility] on 10/21/22      MELD 3.0: 21 at 8/19/2024 12:15 PM  MELD-Na: 20 at 8/19/2024 12:15 PM  Calculated from:  Serum Creatinine: 2.0 mg/dL at 8/19/2024 12:15 PM  Serum Sodium: 137 mmol/L at 8/19/2024 12:15 PM  Total Bilirubin: 3.1 mg/dL at 8/19/2024 12:15 PM  Serum Albumin: 3.1 g/dL at 8/19/2024 12:15 PM  INR(ratio): 1.3 at 8/19/2024 12:15 PM  Age at listing (hypothetical): 48 years  Sex: Male at 8/19/2024 12:15 PM      PMH:  Colon resection- 2007- mass- not Ca  Diverticulitis  CKD  Hypertension  Hip OA- left hip  PE- 2018- stopped warfarin due to GI bleed      SH:  Disabled  Ex-smoker (1 pack  robert day for 3 years in his 20s)  Lives with mother  6 kids- healthy    FH:  Nil liver      Review of Systems   Constitutional:  Negative for activity change, appetite change, chills, fatigue, fever and unexpected weight change.   HENT:  Negative for hearing loss.    Eyes:  Negative for discharge and visual disturbance.   Respiratory:  Negative for cough, chest tightness, shortness of breath and wheezing.    Cardiovascular:  Negative for chest pain, palpitations and leg swelling.   Gastrointestinal:  Negative for abdominal distention, abdominal pain, constipation, diarrhea and nausea.   Genitourinary:  Negative for dysuria and frequency.   Musculoskeletal:  Negative for arthralgias and back pain.   Skin:  Negative for pallor and rash.   Neurological:  Negative for dizziness, tremors, speech difficulty and headaches.   Hematological:  Negative for adenopathy.   Psychiatric/Behavioral:  Negative for agitation and confusion.          Lab Results   Component Value Date    ALT 37 08/19/2024    AST 52 (H) 08/19/2024    GGT 81 (H) 07/21/2022    ALKPHOS 96 08/19/2024    BILITOT 3.1 (H) 08/19/2024     Past Medical History:   Diagnosis Date    Alcohol withdrawal 5/1/2022    Alcoholic cirrhosis of liver     Alcoholic ketoacidosis 6/6/2021    Arthritis     ATN (acute tubular necrosis)     CHF (congestive heart failure)     Diverticulitis 01/2020    Esophageal varices     GERD (gastroesophageal reflux disease)     GI bleed     Hip arthritis     Left    Hypertension     Liver cirrhosis     Macrocytic anemia     Pulmonary embolism 08/2018    Unprovoked DVT.  Stop Coumadin due to GIB    Thrombocytopenia      Past Surgical History:   Procedure Laterality Date    ANKLE SURGERY      BLOCK, NERVE, PERIPHERAL Left 06/24/2022    Procedure: Left Hip femoral-obturator accessory nerve block;  Surgeon: Robbin De La Garza DO;  Location: [facility];  Service: Pain Management;  Laterality: Left;    COLON SURGERY  2007    COLONOSCOPY   09/05/2019    [facility]    COLONOSCOPY N/A 02/17/2021    Procedure: COLONOSCOPY;  Surgeon: Renea Billingsley MD;  Location: [facility];  Service: Endoscopy;  Laterality: N/A;    COLONOSCOPY N/A 2/13/2023    Procedure: COLONOSCOPY;  Surgeon: Rudy Orellana MD;  [facility]  Service: Endoscopy;  Laterality: N/A;  cirrhosis-labs done on 1/11/23  instr portal-GT  No answer for precall- [place]    COLONOSCOPY N/A 3/10/2023    Procedure: COLONOSCOPY;  Surgeon: Rudy Orellana MD;  [facility]: [facility] (4[facility]);  Service: Endoscopy;  Laterality: N/A;  inst via poral per pt request    COLONOSCOPY W/ BIOPSIES  02/17/2021    ESOPHAGOGASTRODUODENOSCOPY N/A 07/26/2019    Procedure: EGD (ESOPHAGOGASTRODUODENOSCOPY);  Surgeon: Brian Trivedi MD;  Location: [facility];  Service: Endoscopy;  Laterality: N/A;    ESOPHAGOGASTRODUODENOSCOPY N/A 04/08/2020    Procedure: EGD (ESOPHAGOGASTRODUODENOSCOPY);  Surgeon: Donn Acuna MD;  Location: [facility];  Service: Endoscopy;  Laterality: N/A;    ESOPHAGOGASTRODUODENOSCOPY N/A 01/03/2021    Procedure: EGD (ESOPHAGOGASTRODUODENOSCOPY)- coffee ground emesis, hx varices;  Surgeon: Steve Chairez MD;  Location: [facility];  Service: Endoscopy;  Laterality: N/A;    ESOPHAGOGASTRODUODENOSCOPY N/A 05/03/2021    Procedure: EGD (ESOPHAGOGASTRODUODENOSCOPY);  Surgeon: Jeanmarie Ngo MD;  Location: [facility];  Service: Endoscopy;  Laterality: N/A;    ESOPHAGOGASTRODUODENOSCOPY N/A 07/19/2021    Procedure: ESOPHAGOGASTRODUODENOSCOPY (EGD);  Surgeon: Claudio Medeiros MD;  Location: [facility];  Service: Endoscopy;  Laterality: N/A;    ESOPHAGOGASTRODUODENOSCOPY  12/20/2021    ESOPHAGOGASTRODUODENOSCOPY N/A 12/20/2021    Procedure: EGD (ESOPHAGOGASTRODUODENOSCOPY);  Surgeon: Ajay Jackson MD;  Location: [facility];  Service: Endoscopy;  Laterality: N/A;    ESOPHAGOGASTRODUODENOSCOPY N/A 05/03/2022    Procedure: EGD (ESOPHAGOGASTRODUODENOSCOPY);  Surgeon: Brian Trivedi MD;   Location: [facility] ENDO;  Service: Endoscopy;  Laterality: N/A;    ESOPHAGOGASTRODUODENOSCOPY N/A 7/7/2022    Procedure: EGD (ESOPHAGOGASTRODUODENOSCOPY);  Surgeon: Ajay Jackson MD;  Location: [facility];  Service: Endoscopy;  Laterality: N/A;    ESOPHAGOGASTRODUODENOSCOPY N/A 11/29/2022    Procedure: EGD (ESOPHAGOGASTRODUODENOSCOPY);  Surgeon: Edouard Maynard MD;  Location: [facility]  Service: Endoscopy;  Laterality: N/A;    ESOPHAGOGASTRODUODENOSCOPY N/A 4/28/2023    Procedure: EGD (ESOPHAGOGASTRODUODENOSCOPY);  Surgeon: Caesar Dickens MD;  Location: [facility];  Service: Endoscopy;  Laterality: N/A;    ESOPHAGOGASTRODUODENOSCOPY N/A 3/28/2024    Procedure: EGD (ESOPHAGOGASTRODUODENOSCOPY);  Surgeon: Jeanmarie Ngo MD;  Location: [facility];  Service: Endoscopy;  Laterality: N/A;    HERNIA REPAIR Left     Inguinal    INJECTION OF JOINT Right 9/28/2023    Procedure: RT Hip Injection;  Surgeon: Robbin De La Garza DO;  [facility]: [facility];  Service: Pain Management;  Laterality: Right;  oral - 20 mins    UPPER GASTROINTESTINAL ENDOSCOPY N/A 07/07/2022     Current Outpatient Medications   Medication Sig    folic acid (FOLVITE) 1 MG tablet Take 1 tablet (1 mg total) by mouth once daily.    furosemide (LASIX) 20 MG tablet Take 1 tablet (20 mg total) by mouth 2 (two) times daily. for 3 days    metoprolol tartrate (LOPRESSOR) 50 MG tablet Take 1 tablet (50 mg total) by mouth 2 (two) times daily. Do not take if heart rate is less than 60    rifAXIMin (XIFAXAN) 550 mg Tab Take 1 tablet (550 mg total) by mouth 2 (two) times daily.    spironolactone (ALDACTONE) 25 MG tablet Take 1 tablet (25 mg total) by mouth once daily.    thiamine 100 MG tablet Take 1 tablet (100 mg total) by mouth once daily.    traMADoL (ULTRAM) 50 mg tablet Take 1 tablet (50 mg total) by mouth 4 (four) times daily as needed for Pain.    calcitRIOL (ROCALTROL) 0.25 MCG Cap Take 1 capsule (0.25 mcg total) by mouth once  daily. (Patient not taking: Reported on 4/22/2024)    ferrous gluconate (FERGON) 240 (27 FE) MG tablet Take 2 tablets (480 mg total) by mouth 2 (two) times daily with meals. (Patient not taking: Reported on 4/22/2024)    tadalafiL (CIALIS) 20 MG Tab Take 1 tablet (20 mg total) by mouth daily as needed (erectile dysfunction). (Patient not taking: Reported on 8/19/2024)     No current facility-administered medications for this visit.       Objective:      Physical Exam  HENT:      Head: Normocephalic.   Eyes:      General: Scleral icterus present.      Pupils: Pupils are equal, round, and reactive to light.   Neck:      Thyroid: No thyromegaly.   Cardiovascular:      Rate and Rhythm: Normal rate and regular rhythm.      Heart sounds: Normal heart sounds.   Pulmonary:      Effort: Pulmonary effort is normal.      Breath sounds: Normal breath sounds. No wheezing.   Abdominal:      General: There is no distension.      Palpations: Abdomen is soft. There is no mass.      Tenderness: There is no abdominal tenderness.   Lymphadenopathy:      Cervical: No cervical adenopathy.   Skin:     General: Skin is warm.      Findings: No erythema or rash.   Neurological:      Mental Status: He is alert and oriented to person, place, and time.   Psychiatric:         Behavior: Behavior normal.           Assessment:       1. Cirrhosis of liver without ascites, unspecified hepatic cirrhosis type      Plan:   This 48 year old man has decompensated alcohol related cirrhosis with jaundice and mild ascites. His MELD score despite abstinence from alcohol is 21 driven by his bilirubin and his chronically elevated creatinine.    Once again, I explained to him that his liver function is severely impaired but can recover with prolonged abstinence. Explained that he should aim for lifelong complete abstinence.    - Left hip fracture 4 years ago with poor healing and dificulty walking  - Walker around the house  - wheelchair for distances    I am  concerned that he has had multiple relapses despite completing rehab but given his high MELD, I will initiate a liver tx evaluation at this point and make sure that he is carefully assessed by our social work and psychiatry team.

## 2024-08-22 ENCOUNTER — TELEPHONE (OUTPATIENT)
Dept: TRANSPLANT | Facility: CLINIC | Age: 49
End: 2024-08-22
Payer: MEDICARE

## 2024-08-22 DIAGNOSIS — Z76.82 AWAITING LIVER TRANSPLANT: Primary | ICD-10-CM

## 2024-08-22 DIAGNOSIS — Z76.82 ORGAN TRANSPLANT CANDIDATE: ICD-10-CM

## 2024-08-22 NOTE — LETTER
August 22, 2024    Irvin Diaz  [address] [address]        Dear Irvin Diaz  MRN: [id number]    Securing your caregivers is one of your most important actions during your transplant work-up. Their care and support are so vital, you can only be transplanted if you have reliable caregivers.     What You Need to Know:   Due to current Covid restrictions, one adult caregiver-s must meet with the transplant  during your work-up. The second caregiver should be available by phone. We need to make sure that both caregivers are very clear about what is expected of them.    Why are my caregivers so important?  Your caregivers play a major role before, during and especially after your transplant. They help you do things that you physically cannot do as you get better. They support you mentally and emotionally as you handle the ups and downs of the transplant process.   They also transport you to your follow-up appointments during your post-transplant recovery.    How many caregivers do I need?  You must have at least two adult caregivers:   Primary caregiver - the main person to stay with you, help you and coordinate other caregivers  AND   Secondary caregiver - someone who is committed to serving as a reliable back-up caregiver for you    We strongly recommend that you have a third caregiver to also help in case they are needed. The more caregivers you have, the easier it will be for your primary caregiver to call on back-ups when needed.    Who can be my caregiver?  Your caregivers may be a spouse, partner, parent, adult family member or close friend, or some combination of these persons. Note: Your caregiver cannot be home health or a random person you hire. Caregivers must be reliable and committed. They must be able to provide assistance and be available with short notice, as time of transplant typically cannot be determined ahead of time.    How long do my caregivers need to help  me?  One of your two adult caregivers must be able to be with you and help you 24 hours a day, seven days a week, for at least three months - or longer as needed, until your doctor says you can be alone.    What do my caregivers agree to do for me?  For at least three months - or longer as needed, your caregivers agree to:    Care for you 24-hours a day, seven days a week   Stay locally in the [place] area if you live more than 1-hour away, or if recommended by the transplant team  Transporting you where you to need to go before and after transplant. This includes transporting you for:  All needed clinic/hospital visits, labs and procedures  Emergencies  Be available to you as needed while you are in the hospital for your transplant  Be with you for all teaching in the hospital prior to discharge  Stay actively involved with all aspects of your care   Learn what symptoms to look for before and after transplant  Use good judgment about any complications  Tell the transplant team right away if you have any concerns or health changes before and after transplant  Learn your medicines; Make sure you are taking them the right way and at the right time, on the right days  Be a strong, stable support for you to help you  [place] during tough emotional times  Communicate directly with the health care team members  Stay sober and drug-free  Take their own medications on time  Follow the advice of your transplant team, including mental health and social work recommendations  Be able to take off from work/school without worrying about unpaid and/or missed work/school days  Have back-up caregiver plans for the unexpected times when they cannot care for you  Take breaks and bring in back-up caregivers when needed  Coordinate caregiver plan schedule - who is coming when and for how many days each     Thank you for choosing us as your transplant center. We are committed to providing you with excellent care. We want the best for  you!     Be sure to bring your primary  adult caregiver with you for your workup appointments.    Sincerely,  Your Liver Transplant Team  [facility]  [address] [address] [place] [address]  [phone]

## 2024-08-22 NOTE — TELEPHONE ENCOUNTER
Spoke to pt regarding transplant evaluation. Pt has agreed to September 5 and 6 with his caregiver (wife) all test ans consults needed discussed. Pt is aware he will be called right before eval to discuss appts. Understanding stated.

## 2024-08-22 NOTE — TELEPHONE ENCOUNTER
----- Message from Jacek Arreaga MD sent at 8/19/2024  5:03 PM CDT -----  Can we start an evaluation please?

## 2024-08-22 NOTE — LETTER
August 22, 2024    Irvinradha Estradaruder  [address] [facility] [address] [facility] [address]      Dear Irvin Diaz:  MRN: [id number]    You have been referred to the [facility] in [place] for evaluation for a liver transplant. Thank you for your interest in our program. Since its creation in 1987, we have performed over 2000 liver transplants. Our success rates have ranked us as one of the top programs in the country.     We are excited to have you begin your transplant evaluation on. Please make the necessary arrangements to be in our transplant center for 2 days on these dates September 5 & 6, 2024. If you are more than 30 minutes late for these appointments, your evaluation will have to be rescheduled.    All appointments on the first day except for ultrasound and CXR will be located in our Transplant Clinic on the 1st floor of the clinic.      Your appointment will include the following:   Blood work (if you are told that you must fast, you may take your medications with a sip of water  Educational seminar   Ultrasound/MRI/Bone Mineral Density   2 D echo and EKG   /Surgeon/Infectious Disease provider/Dietician/  Immunizations      Things to remember for your appointments for transplant evaluation:   We have enclosed an instruction sheet for directions. Please allow plenty of time for travel/traffic.  A caregiver is REQUIRED to attend these appointments with you.  Bring ALL insurance information including medication card. Please COMPLETE and BRING back all enclosed forms including: Liver Disease Medical History Form, Consent to Treat,  Demographic Form and your immunization records.  Bring a current list of your medications and your medication bottles.  Please bring copies of any outside medical reports from the past year, such as mammogram, PAP smear, ultrasounds, CAT scans, colonoscopy, endoscopy, cardiac angiogram, cardiac stress tests or  angioplasty reports. This may prevent us from repeating tests.     To reschedule your appointments, please call [phone] or [phone] ext. [phone] and ask for an . Failure to cancel in advance could result in a $50.00 cancellation fee.     We look forward to meeting you and assisting you through the evaluation process.     Sincerely,    [facility]  [address] [place] [address]  [phone]

## 2024-09-03 ENCOUNTER — TELEPHONE (OUTPATIENT)
Dept: TRANSPLANT | Facility: CLINIC | Age: 49
End: 2024-09-03
Payer: MEDICARE

## 2024-09-03 NOTE — TELEPHONE ENCOUNTER
Returned call to pt to discuss questions for transplant evaluation. Informed pt the evaluation nurse will give him a call tomorrow to discuss in more detail. Understanding stated.

## 2024-09-03 NOTE — TELEPHONE ENCOUNTER
----- Message from Cary Ma MA sent at 9/3/2024 11:24 AM CDT -----  Regarding: FW: Consult/Advisory  Contact: [phone]    ----- Message -----  From: Kg Peguero  Sent: 9/3/2024  11:01 AM CDT  To: [facility] Staff  Subject: Consult/Advisory                                 Consult/Advisory     Name Of Caller: pt         Contact Preference:   [phone]     Nature of call: Pt states that he was advise he needs to care taker for tomorrow's appts. Pt would like to know which appts he will need a care takers for. Please call to advise

## 2024-09-04 NOTE — TELEPHONE ENCOUNTER
Patient called to confirm that they will be attending the scheduled appointments for Liver Transplant Fast Pass Evaluation scheduled to start 9/5/24  at 0730.  Patient confirms that caregivers will be present for the scheduled appointments.  Patient appointments reviewed along with location and special instructions.  Patient questions answered at this time and number provided to call the office if there is any problem.

## 2024-09-04 NOTE — TELEPHONE ENCOUNTER
Spoke with the patient about having caregivers  present for the evaluation. Patient questions answered about having caregivers present.

## 2024-09-05 ENCOUNTER — HOSPITAL ENCOUNTER (OUTPATIENT)
Dept: RADIOLOGY | Facility: HOSPITAL | Age: 49
Discharge: HOME | End: 2024-09-05
Attending: INTERNAL MEDICINE
Payer: MEDICARE

## 2024-09-05 ENCOUNTER — SOCIAL WORK (OUTPATIENT)
Dept: TRANSPLANT | Facility: CLINIC | Age: 49
End: 2024-09-05
Payer: MEDICARE

## 2024-09-05 ENCOUNTER — CLINICAL SUPPORT (OUTPATIENT)
Dept: TRANSPLANT | Facility: CLINIC | Age: 49
End: 2024-09-05
Payer: MEDICARE

## 2024-09-05 ENCOUNTER — PATIENT MESSAGE (OUTPATIENT)
Dept: TRANSPLANT | Facility: HOSPITAL | Age: 49
End: 2024-09-05
Payer: MEDICARE

## 2024-09-05 VITALS
BODY MASS INDEX: 33.21 KG/M2 | TEMPERATURE: 97 F | WEIGHT: 231.94 LBS | RESPIRATION RATE: 18 BRPM | HEART RATE: 80 BPM | HEIGHT: 70 IN | DIASTOLIC BLOOD PRESSURE: 76 MMHG | OXYGEN SATURATION: 99 % | SYSTOLIC BLOOD PRESSURE: 147 MMHG

## 2024-09-05 DIAGNOSIS — Z76.82 ORGAN TRANSPLANT CANDIDATE: ICD-10-CM

## 2024-09-05 DIAGNOSIS — Z76.82 AWAITING LIVER TRANSPLANT: ICD-10-CM

## 2024-09-05 LAB
AMPHET+METHAMPHET UR QL: NEGATIVE
BACTERIA #/AREA URNS AUTO: ABNORMAL /HPF
BARBITURATES UR QL SCN>200 NG/ML: NEGATIVE
BENZODIAZ UR QL SCN>200 NG/ML: NEGATIVE
BILIRUB UR QL STRIP: NEGATIVE
BZE UR QL SCN: NEGATIVE
CANNABINOIDS UR QL SCN: NEGATIVE
CLARITY UR REFRACT.AUTO: CLEAR
COLOR UR AUTO: YELLOW
CREAT UR-MCNC: 137 MG/DL (ref 23–375)
ETHANOL UR-MCNC: <10 MG/DL
GLUCOSE UR QL STRIP: NEGATIVE
HGB UR QL STRIP: ABNORMAL
KETONES UR QL STRIP: NEGATIVE
LEUKOCYTE ESTERASE UR QL STRIP: ABNORMAL
METHADONE UR QL SCN>300 NG/ML: NEGATIVE
MICROSCOPIC COMMENT: ABNORMAL
NITRITE UR QL STRIP: NEGATIVE
OPIATES UR QL SCN: NEGATIVE
PCP UR QL SCN>25 NG/ML: NEGATIVE
PH UR STRIP: 6 [PH] (ref 5–8)
PROT UR QL STRIP: NEGATIVE
RBC #/AREA URNS AUTO: 3 /HPF (ref 0–4)
SP GR UR STRIP: 1.02 (ref 1–1.03)
SQUAMOUS #/AREA URNS AUTO: 0 /HPF
TOXICOLOGY INFORMATION: NORMAL
URN SPEC COLLECT METH UR: ABNORMAL
WBC #/AREA URNS AUTO: 32 /HPF (ref 0–5)
YEAST UR QL AUTO: ABNORMAL

## 2024-09-05 PROCEDURE — 80307 DRUG TEST PRSMV CHEM ANLYZR: CPT | Mod: TXP | Performed by: INTERNAL MEDICINE

## 2024-09-05 PROCEDURE — 81001 URINALYSIS AUTO W/SCOPE: CPT | Mod: TXP,XB | Performed by: INTERNAL MEDICINE

## 2024-09-05 PROCEDURE — 93975 VASCULAR STUDY: CPT | Mod: TC,TXP

## 2024-09-05 PROCEDURE — 71046 X-RAY EXAM CHEST 2 VIEWS: CPT | Mod: TC,TXP

## 2024-09-05 PROCEDURE — 99999 PR PBB SHADOW E&M-EST. PATIENT-LVL III: CPT | Mod: PBBFAC,,,

## 2024-09-05 NOTE — PROGRESS NOTES
Transplant Recipient Adult Psychosocial Assessment  SW met with pt and wife in clinic - [facility] psychosocial from 7/2022    Irvinradha Estradaruder  [address] [facility] [address] [facility] [address]  Telephone Information:   Mobile [phone]   Mobile Not on file.   Home  [phone] (home)  Work  There is no work phone number on file.  E-mail  [email or web address]    Sex: male  YOB: 1975  Age: 48 y.o.    Encounter Date: 9/5/2024  [place] Citizen: yes  Primary Language: English   Needed: no    Emergency Contact:  Name: Florentino Diaz  Relationship: mother  Address: same as patient   Phone Numbers:  C: [phone]    Pt, wife and their 3 children (adults) live in [place], the above address is pt's parents home, they recently lived there. Pt would recover from transplant there, and still uses it as his main address and prefers to keep it this way in the system.    Family/Social Support:   Number of dependents/: Patient denies any dependents in his care.   Marital history: Pt and wife have been  for 19yrs. This is pt's 3rd marriage.  Other family dynamics: Pt has support from his wife, his parents, his 3 siblings, his 6 adult children, and extended family. Pt and wife have 3 adult children, who live in their home with them, and pt has 3 additional, older children from previous marriages.     Household Composition:  Name: Florentino Diaz  Age: 46  Relationship: wife  Does person drive? Yes  C: [phone]    Name: Stan Diaz, age 22, pt's son  Name: Don Estradaruder, age 21, pt's son  Name: Irvin Estradaruder III, 18, pt's son    Do you and your caregivers have access to reliable transportation? yes, pt's wife and father both drives. Pt's wife will be primary with his parents as back-ups  PRIMARY CAREGIVER: Florentino Diaz, patients mother will be primary caregiver, phone number [phone].      provided in-depth information to patient and caregiver regarding pre- and  post-transplant caregiver role.   strongly encourages patient and caregiver to have concrete plan regarding post-transplant care giving, including back-up caregiver(s) to ensure care giving needs are met as needed.    Patient and Caregiver states understanding all aspects of caregiver role/commitment and is able/willing/committed to being caregiver to the fullest extent necessary.    Patient and Caregiver verbalizes understanding of the education provided today and caregiver responsibilities.         remains available. Patient and Caregiver agree to contact  in a timely manner if concerns arise.      Able to take time off work without financial concerns: yes. Patient reports both of patients parents are retired.     Additional Significant Others who will Assist with Transplant:  Name: Irvin Sharpe (present in evaluation)  Age: 75  City: [place] State: [place]  Relationship: father  Does person drive? yes  [phone]     Name: Sahron Diaz  Age: 74  City: [place]: [place]  Relationship: mother  Does person drive? no  [phone]    Living Will: yes  Healthcare Power of : no  Advance Directives on file: <<no information> per medical record.  Verbally reviewed LW/HCPA information.   provided patient with copy of LW/HCPA documents and provided education on completion of forms.    Living Donors: N/A    Highest Education Level: Attended College/Technical School  Reading Ability: college  Reports difficulty with: seeing- patient reports wearing glasses.   Learns Best By: Patient reports learning best by combination of hands on learning, written information, and reading information.      Status: no  VA Benefits: no     Working for Income: No  If no, reason not working: Disability Patient reports that he has been receiving disability since June 2019 due to a hip injury and is now receiving it due to his liver diagnosis.   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      Spouse/Significant Other Employment: Patient reports patients wife is a doing some property management, and side jobs. She previously worked as a , but says the market has been very slow.    Disabled: yes, since 2019, due to hip issues, and now ESLD as well.    Monthly Income: $ 1428, pt and wife both work side jobs and are able to afford their bills.  Able to afford all costs now and if transplanted, including medications: yes  Patient and Caregiver verbalizes understanding of personal responsibilities related to transplant costs and the importance of having a financial plan to ensure that patients transplant costs are fully covered.      provided fundraising information/education. Patient and Caregiver verbalizes understanding.   remains available.    Insurance:   Payer/Plan Subscr  Sex Relation Sub. Ins. ID Effective Group Num   1. [employer]ET[employer] MANAGED* MARIA ISABEL LUX* 1975 Male Self [id number]                                   [address]     Primary Insurance (for UNOS reporting): Public Insurance - Medicare & Choice  Secondary Insurance (for UNOS reporting): None, pt has had Medicaid in the past  Patient and Caregiver verbalizes clear understanding that patient may experience difficulty obtaining and/or be denied insurance coverage post-surgery. This includes and is not limited to disability insurance, life insurance, health insurance, burial insurance, long term care insurance, and other insurances.    Patient and Caregiver also reports understanding that future health concerns related to or unrelated to transplantation may not be covered by patient's insurance. Resources and information provided and reviewed.      Patient and Caregiver provides verbal permission to release any necessary information to outside resources for patient care and discharge planning. Resources and information  provided are reviewed.      Dialysis Adherence: patient denies     Infusion Service: patient utilizing? no  Home Health: patient utilizing? None currently  DME: yes cane, walker, related to hip injury  Pulmonary/Cardiac Rehab: pt denies   ADLS: Patient reports being independent with ADLs, and drives.    Adherence: Patient reports adhering to his health care regiemen. Adherence education and counseling provided.     Per History Section:  Past Medical History:   Diagnosis Date    Alcohol withdrawal 2022    Alcoholic cirrhosis of liver     Alcoholic ketoacidosis 2021    Arthritis     ATN (acute tubular necrosis)     CHF (congestive heart failure)     Diverticulitis 2020    Esophageal varices     GERD (gastroesophageal reflux disease)     GI bleed     Hip arthritis     Left    Hypertension     Liver cirrhosis     Macrocytic anemia     Pulmonary embolism 2018    Unprovoked DVT.  Stop Coumadin due to GIB    Thrombocytopenia      Social History     Tobacco Use    Smoking status: Former     Current packs/day: 0.00     Types: Cigarettes     Quit date: 2000     Years since quittin.6    Smokeless tobacco: Never    Tobacco comments:     quit 20 years ago   Substance Use Topics    Alcohol use: Not Currently     Comment: Quit drinking 21     Social History     Substance and Sexual Activity   Drug Use Not Currently     Social History     Substance and Sexual Activity   Sexual Activity Yes    Comment: occ       Per Today's Psychosocial:  Tobacco: pt denies current use. Patient reports last use of smoking cigarettes being around 25 yrs ago. Pt started as a teen, and at his most smoked 1PPD.    Alcohol: Pt reports that his last drink was in 2024. Pt started drinking as a teenager and by his late 20s was drinking daily, this progressed to a fifth of gin daily. More recently pt stated he was drinking a pint daily. Pt attended [facility] in  and was sober for about 5 months following that, pt  "relapsed and had several positive PETHs, and has been drinking steadily since then until this April, per report.    Pt states that "a long time ago" he attended another outpt program, but did not know the name, and reports very limited sobriety time from that.    SW provided education that pt would need to complete IOP due to history of relapse and continued drinking despite diagnosis. Pt stated "I don't have time for an IOP". SW provided education that this is his life, and his choice, but that this is what he needs to do to be an appropriate candidate. Understanding verbalized.    Pt states he is attending weekly group therapy sessions at [facility] in [place]. SW provided education that while this is good, he needs to attend IOP and AA, or similar, related to substance abuse. SW provided resources for [place] (provided in person) and [place] (sent via patient portal) IOPs, and AA, and similar resources.    Pt does not appear very motivated and appears to have limited insight into his alcohol abuse. SW recommending pt see addictive psychiatry.    Illicit Drugs/Non-prescribed Medications: Patient denies current use. Patient reports smoking marijuana on and off for 15 yrs.  Patient reports using Cocaine in the past in 2002 for about a month before getting arrested for possession.     Patient and Caregiver states clear understanding of the potential impact of substance use as it relates to transplant candidacy and is aware of possible random substance screening. Substance abstinence/cessation counseling, education and resources provided and reviewed.     Arrests/DWI/Treatment/Rehab:   Patient reports being arrested in the past for possession of marijuana and cocaine in 2002 and completed a drug court program. Pt completed [facility] in 2022, and did attend another IOP in the more remote past, but did not know the name. Pt reports no DUIs.    Psychiatric History:    Mental Health: patient " denies.  Psychiatrist/Counselor: Patient reports seeing a psychiatrist through his IOP in the past.   Medications: None reported  Suicide/Homicide Issues: Pt reports none currently, or in the past.  Safety at home: Patient reports feeling safe in his home environment.    Knowledge: Patient and Caregiver states having clear understanding and realistic expectations regarding the potential risks and potential benefits of organ transplantation and organ donation, agrees to discuss with health care team members and support system members and to utilize available resources and express questions and/or concerns in order to further facilitate the pt informed decision-making. Resources and information provided and reviewed.     Patient and Caregiver is aware of [facility]'s affiliation and/or partnership with agencies in home health care, LTAC, SNF, [facility], and other hospitals and clinics.    Understanding: Patient and Caregiver reports having a clear understanding of the many lifetime commitments involved with being a transplant recipient, including costs, compliance, medications, lab work, procedures, appointments, concrete and financial planning, preparedness, timely and appropriate communication of concerns, abstinence (ETOH, tobacco, illicit non-prescribed drugs), adherence to all health care team recommendations, support system and caregiver involvement, appropriate and timely resource utilization and follow-through, mental health counseling as needed/recommended, and patient and caregiver responsibilities.  Social Service Handbook, resources and detailed educational information provided and reviewed. Educational information provided.    Patient and Caregiver also reports current and expected compliance with health care regime and states having a clear understanding of the importance of compliance.      Patient and Caregiver reports a clear understanding that risks and benefits may be involved with organ transplantation and  with organ donation.      Patient and Caregiver also reports clear understanding that psychosocial risk factors may affect patient, and include but are not limited to feelings of depression, generalized anxiety, anxiety regarding dependence on others, post traumatic stress disorder, feelings of guilt and other emotional and/or mental concerns, and/or exacerbation of existing mental health concerns.  Detailed resources provided and discussed.     Patient and Caregiver agrees to access appropriate resources in a timely manner as needed and/or as recommended, and to communicate concerns appropriately. Patient and Caregiver also reports a clear understanding of treatment options available.      reviewed education, provided additional information, and answered questions.    Feelings or Concerns: None reported    Coping: Identify Patient & Caregiver Strategies to [place]:   1. Currently & Pre-transplant - watching TV and spending time with his kids and grandson, working odd jobs. Pt previously worked and was more able to do physical things before his hip injury. He needs surgery, but states the MDs won't be able to do it until after his transplant.   2. At the time of surgery - family support   3. During post-Transplant & Recovery Period - family support    Goals: Patient reports hopefully getting back to the point where he can get back to work, and not be in pain from his hip. Patient referred to Vocational Rehabilitation.    Interview Behavior: Caregiver presents as alert and oriented x 4, calm, communicative, cooperative and asking and answering questions appropriately. Pt presents as alert/oriented x4 and pleasant, but was not very engaged today.     Transplant Social Work - Candidacy  Assessment/Plan:     Psychosocial Suitability: Patient presents as a high risk candidate for liver transplant at this time. Pt has less than 6 months sobriety, and has relapsed several times in the past, mostly recently in  2022 following the completion of [facility] IOP. Pt appears to have limited insight into his alcohol abuse. Protective factors are a solid caregiver plan, no significant mental health history outside of alcohol abuse, active insurance and adequate finances.      Recommendations/Additional Comments:   -completion of IOP/engagement in AA, or similar, provide proof to SW  -see addictive psychiatry  -Fundraising as needed  - maintain abstinence from marijuana use    Hilda Potts LCSW

## 2024-09-05 NOTE — PROGRESS NOTES
Irvin was  seen in clinic for [facility] evaluation.  Handbook on pre-liver transplant information (see outline below) was given to the patient.  Patient's wife , accompanied him to scheduled appointments.   Patient viewed pre-liver transplant education slides via desktop in transplant clinic.  Informed consent signed and written information given on selection criteria.    LIVER TRANSPLANT WORK-UP EDUCATION   I. UNDERSTANDING THE TRANSPLANT PROCESS     A. Transplant team      B. MELD score      C. Balancing urgency and outcome     D. Liver Transplant Options         1.  Donor         2. Living Donor--rationale, benefits     E. Transplant Work-up         1. Medical         2. Psychological and Social--lifetime commitment, life changes, personal plan/ goal         3. Financial--fundraising     F.  Completed work-up and Next Steps    G. Wait Time         1.  Can be listed at more than one center         2.  Can transfer wait time     H. The Call       I. Possible donor options         1. DCD         2. Hep B Core and Hep C Positive         3. Increased Risk     J.  Liver Transplant Surgery         1. Length         2. Transfusions, cell saver         3. Surgical risks         4. What to expect after sugery--Central lines, drains, Saravia catheter, incision, endotracheal              tube, NG tube, length of stay in ICU/ TSU  II.  HOW TO BEST CARE FOR YOURSELF (Take Five To Thrive)  III. UNDERSTANDING LIFE AFTER TRANSPLANT  A. Medicines after transplant      1. Immunosuppression--infection and rejection  B. Labs   IV. ADULT LIVER SURVIVAL RATES

## 2024-09-07 RX ORDER — TADALAFIL 20 MG/1
20 TABLET ORAL DAILY PRN
Qty: 15 TABLET | Refills: 11 | Status: CANCELLED | OUTPATIENT
Start: 2024-09-07 | End: 2025-09-07

## 2024-09-09 ENCOUNTER — PATIENT MESSAGE (OUTPATIENT)
Dept: UROLOGY | Facility: CLINIC | Age: 49
End: 2024-09-09
Payer: MEDICARE

## 2024-09-10 RX ORDER — TADALAFIL 20 MG/1
20 TABLET ORAL DAILY PRN
Qty: 15 TABLET | Refills: 11 | OUTPATIENT
Start: 2024-09-10 | End: 2025-09-10

## 2024-09-10 NOTE — TELEPHONE ENCOUNTER
Spoke with patient, informed him that his liver doctor cancelled his Cialis due to liver transplant he is waiting on and it has contraindications.

## 2024-09-13 ENCOUNTER — TELEPHONE (OUTPATIENT)
Dept: UROLOGY | Facility: CLINIC | Age: 49
End: 2024-09-13
Payer: MEDICARE

## 2024-09-13 RX ORDER — TADALAFIL 20 MG/1
20 TABLET ORAL DAILY PRN
Qty: 15 TABLET | Refills: 11 | Status: SHIPPED | OUTPATIENT
Start: 2024-09-13 | End: 2025-09-13

## 2024-09-19 ENCOUNTER — PATIENT MESSAGE (OUTPATIENT)
Dept: PRIMARY CARE CLINIC | Facility: CLINIC | Age: 49
End: 2024-09-19
Payer: MEDICARE

## 2024-09-26 ENCOUNTER — TELEPHONE (OUTPATIENT)
Dept: PAIN MEDICINE | Facility: CLINIC | Age: 49
End: 2024-09-26

## 2024-09-26 ENCOUNTER — PATIENT MESSAGE (OUTPATIENT)
Dept: PAIN MEDICINE | Facility: CLINIC | Age: 49
End: 2024-09-26

## 2024-09-26 ENCOUNTER — OFFICE VISIT (OUTPATIENT)
Dept: PAIN MEDICINE | Facility: CLINIC | Age: 49
End: 2024-09-26
Payer: MEDICARE

## 2024-09-26 VITALS
DIASTOLIC BLOOD PRESSURE: 82 MMHG | HEIGHT: 70 IN | WEIGHT: 231.94 LBS | SYSTOLIC BLOOD PRESSURE: 145 MMHG | BODY MASS INDEX: 33.21 KG/M2 | HEART RATE: 65 BPM

## 2024-09-26 DIAGNOSIS — M17.12 PRIMARY OSTEOARTHRITIS OF LEFT KNEE: ICD-10-CM

## 2024-09-26 DIAGNOSIS — G89.29 CHRONIC LEFT HIP PAIN: Primary | ICD-10-CM

## 2024-09-26 DIAGNOSIS — G89.4 CHRONIC PAIN SYNDROME: ICD-10-CM

## 2024-09-26 DIAGNOSIS — F11.90 CHRONIC, CONTINUOUS USE OF OPIOIDS: ICD-10-CM

## 2024-09-26 DIAGNOSIS — M17.12 PRIMARY OSTEOARTHRITIS OF LEFT KNEE: Primary | ICD-10-CM

## 2024-09-26 DIAGNOSIS — M25.552 CHRONIC LEFT HIP PAIN: Primary | ICD-10-CM

## 2024-09-26 DIAGNOSIS — M16.11 PRIMARY OSTEOARTHRITIS OF RIGHT HIP: ICD-10-CM

## 2024-09-26 DIAGNOSIS — M87.052 AVASCULAR NECROSIS OF HIP, LEFT: ICD-10-CM

## 2024-09-26 DIAGNOSIS — F11.20 OPIOID DEPENDENCE, UNCOMPLICATED: ICD-10-CM

## 2024-09-26 PROCEDURE — 3066F NEPHROPATHY DOC TX: CPT | Mod: CPTII,S$GLB,, | Performed by: STUDENT IN AN ORGANIZED HEALTH CARE EDUCATION/TRAINING PROGRAM

## 2024-09-26 PROCEDURE — 3077F SYST BP >= 140 MM HG: CPT | Mod: CPTII,S$GLB,, | Performed by: STUDENT IN AN ORGANIZED HEALTH CARE EDUCATION/TRAINING PROGRAM

## 2024-09-26 PROCEDURE — 1159F MED LIST DOCD IN RCRD: CPT | Mod: CPTII,S$GLB,, | Performed by: STUDENT IN AN ORGANIZED HEALTH CARE EDUCATION/TRAINING PROGRAM

## 2024-09-26 PROCEDURE — 99999 PR PBB SHADOW E&M-EST. PATIENT-LVL III: CPT | Mod: PBBFAC,,, | Performed by: STUDENT IN AN ORGANIZED HEALTH CARE EDUCATION/TRAINING PROGRAM

## 2024-09-26 PROCEDURE — 3061F NEG MICROALBUMINURIA REV: CPT | Mod: CPTII,S$GLB,, | Performed by: STUDENT IN AN ORGANIZED HEALTH CARE EDUCATION/TRAINING PROGRAM

## 2024-09-26 PROCEDURE — 3008F BODY MASS INDEX DOCD: CPT | Mod: CPTII,S$GLB,, | Performed by: STUDENT IN AN ORGANIZED HEALTH CARE EDUCATION/TRAINING PROGRAM

## 2024-09-26 PROCEDURE — 3079F DIAST BP 80-89 MM HG: CPT | Mod: CPTII,S$GLB,, | Performed by: STUDENT IN AN ORGANIZED HEALTH CARE EDUCATION/TRAINING PROGRAM

## 2024-09-26 PROCEDURE — 3044F HG A1C LEVEL LT 7.0%: CPT | Mod: CPTII,S$GLB,, | Performed by: STUDENT IN AN ORGANIZED HEALTH CARE EDUCATION/TRAINING PROGRAM

## 2024-09-26 PROCEDURE — 99214 OFFICE O/P EST MOD 30 MIN: CPT | Mod: S$GLB,,, | Performed by: STUDENT IN AN ORGANIZED HEALTH CARE EDUCATION/TRAINING PROGRAM

## 2024-09-26 RX ORDER — TRAMADOL HYDROCHLORIDE 50 MG/1
50 TABLET ORAL 4 TIMES DAILY PRN
Qty: 120 EACH | Refills: 2 | Status: SHIPPED | OUTPATIENT
Start: 2024-10-07 | End: 2025-01-05

## 2024-09-26 NOTE — TELEPHONE ENCOUNTER
----- Message from Robbin Villarreal DO sent at 2024 11:10 AM CDT -----  Regarding: Order for MARIA ISABEL LUX JR.    Patient Name: MARIA ISABEL LUX JR.([id number])  Sex: Male  : 1975      PCP: ELENI ESTRADA    Center: [facility]     Types of orders made on 2024: Procedure Request    Order Date:2024  Ordering User:ROBBIN VILLARREAL [[id number]]  Encounter Provider:Robbin Villarreal DO [[id number]]  Z1   Authorizing Provider: Robbin Villarreal DO [[id number]]  Department:[facility][[id number]]    Common Order Information  Procedure -> Joint/Bursa Injection (Specify joint and laterality) Cmt: right hip    Pre-op Diagnosis -> Chronic hip pain     Order Specific Information  Order: Procedure Order to Pain Management [Custom: [id number]]  Order #:          [id number]: 1 FUTURE    Priority: Routine  Class:   Clinic Performed    Future Order Information      Expires on:2025            Expected by:2024                   Comment:10/8/24 - Right hip injection - no sed - no AC    Associated Diagnoses      M16.11 Primary osteoarthritis of right hip      Physician -> hanyu         Is patient on anti-coagulants? -> No Cmt: low platelets        Facility Name: -> [facility]           Priority: Routine  Class:   Clinic Performed    Future Order Information      Expires on:2025            Expected by:2024                   Comment:10/8/24 - Right hip injection - no sed - no AC    Associated Diagnoses      M16.11 Primary osteoarthritis of right hip      Procedure -> Joint/Bursa Injection (Specify joint and laterality) Cmt:                 right hip        Physician -> hanyu         Is patient on anti-coagulants? -> No   Cmt: low platelets        Pre-op Diagnosis -> Chronic hip pain         Facility Name: -> [facility]

## 2024-09-26 NOTE — PROGRESS NOTES
Chronic Pain - f/u    Referring Physician: No ref. provider found    Date: 09/26/2024     Re: Irvin Diaz Jr.  MR#: [id number]  YOB: 1975  Age: 49 y.o.    Chief Complaint: hip pain  Chief Complaint   Patient presents with    Follow-up    Knee Pain     **This note is dictated using the M*Modal Fluency Direct word recognition program. There are word recognition mistakes that are occasionally missed on review.**    ASSESSMENT: 49 y.o. year old male with left hip pain, consistent with     1. Primary osteoarthritis of left knee        2. Avascular necrosis of hip, left  traMADoL (ULTRAM) 50 mg tablet      3. Chronic, continuous use of opioids  traMADoL (ULTRAM) 50 mg tablet      4. Opioid dependence, uncomplicated        5. Primary osteoarthritis of right hip  Procedure Order to Pain Management      6. Chronic pain syndrome  traMADoL (ULTRAM) 50 mg tablet        PLAN:     Left knee pain likely OA 2/2 altered mechanics due to hip AVN  -pain with varus/valgus strain  -left knee x-ray shows KL grade 2 OA with medial joint space narrowing  -minimal edema. Pain worse with weight bearing.  -6/20/24 - knee injection CSI - 70%x1m  -He has failed PT in the knee before.  -He uses a knee brace.  -failed PT. Failed conservative therapy.  Cannot take NSAIDs due to low platelets  -MEDICAL NECESSITY FOR VISCOSUPPLEMENTATION USE: After thorough evaluation of the patient, I have determined that viscosupplementation treatment is medically necessary. The patient has painful degenerative joint disease (DJD) of the knee(s) with failure of conservative treatments including lifestyle modifications and rehabilitation exercises. Oral analgesics including NSAIDs have not adequately controlled the patient's symptoms or are contraindicated. There is radiographic evidence of Kellgren-Maury grade II (or greater) osteoarthritic (OA) changes, or if lack of radiographic evidence, there is arthroscopic or other evidence of  chondrosis of the knee(s).   10/10/24 - L knee synvisc-one inj.    Avascular necrosis of the left hip   -s/p hip block without significant pain relief and complicated by Hematoma.   -tolerating oxycodone 5 mg BID PRN (he thinks his lucid dreams were due to EtOH withdrawal). No more lucid dreams. Switch back to tramadol  -switched back to tramadol because he has been doing better and does not think that he needs the oxycodone anymore.  -refill Tramadol QID. This has been working for him. Helps him function. Refill x3 months  - Medrol dose pack, helped while taking, but not sustained.  -not surgical candidate  -Not a candidate for further procedures due to bleeding risk  - Plts improved to ~110.  -buprenorphine likely not an option 2/2 to his ESLD and liver transplant status    Right hip pain  -XR Right hip given hx of avascular necrosis in the left hip. This showed moderate OA in the hip  9/28/23 - right hip injection - >50% relief for 6 months  10/8/24 - Right hip injection - no sed - no AC  -recommended minimizing repeats due to risks unless the pain becomes unbearable.    Allodynia  -stopped Nortriptyline to 50 mg nightly. Helps him sleep, but made him nauseous    Thorombocytopenia 2/2 cirrhosis  -platelets are 92 on 5/26/22, 82 on 9/7/22, 74 on 10/25/22, 110 on 5/18/23  -Avanos recommended above 50, but he had hematoma, so will not proceed.    Chronic Opioid Use  - UDS from October and September did not show opioids but he states he was decreasing use at that time.  -no UDS today, He had a drug screen 05/2023 which was appropriate.  -new UDS 6/20/24 since the one in March 2024 did not show tramadol.  UDS 6/2024 showed MJ.  UDS in 09/2024 was appropriate.    Cirrhosis 2/2 past EtOH. He states he is not drinking anymore.  -following with hepatology  -possible liver transplant?  -working with psychiatry for EtOH.    Kidney disease  -GFR 54    - RTC before 1/5/24  - Counseled patient regarding the importance of   activity modification.    The above plan and management options were discussed at length with patient. Patient is in agreement with the above and verbalized understanding. It will be communicated with the referring physician via electronic record, fax, or mail.  Lab/study reports reviewed were important and necessary because subsequent medical and treatment recommendations required review of the above lab/study reports. Images viewed/reviewed above were important and necessary because subsequent medical and treatment recommendations required review of the reviewed image(s).     Electronically signed by:  Robbin De La Garza DO  09/26/2024    =========================================================================================================    SUBJECTIVE:    Interval History 9/26/2024:     Irvin Diaz Jr. is a 49 y.o. male presents to the clinic for follow up.  Since last visit the pain has is unchanged.    The pain is located in the left hip area and radiates to the left knee and ankles .  The pain is described as aching, shooting, and stabbing    At BEST  8/10   At WORST  10/10 on the WORST day.    On average pain is rated as 8/10.   Today the pain is rated as 8/10  Symptoms interfere with daily activity and sleeping.   Exacerbating factors: Sitting, Standing, Bending, and Flexing.    Mitigating factors nothing.     Current pain medications: Tramadol 50mg QID  Failed Pain Medications: oxycodone, tramadol, gabapentin, cannot take NSAIDs due to gastric bleeding, cannot take tylenol due to liver failure.     Pain procedures:  6/24/22 - left hip block - no relief. C/b hematoma formation  10/10/23 - Right hip injection - oral sed - no AC - >50% @ 6m  6/20/24 - L Knee - 70%x1m  10/8/24 - Right hip injection - no sed - no AC  10/10/24 - L knee synvisc-1 inj.    Interval History 6/20/2024:    is a 48 y.o. male presents to the clinic for follow up.  Since last visit the pain has is unchanged.    The pain is  located in the left Hip area and radiates to the left knee and ankles.  The pain is described as aching, shooting, and stabbing    At BEST  8/10   At WORST  10/10 on the WORST day.    On average pain is rated as 8/10.   Today the pain is rated as 8/10  Symptoms interfere with daily activity and sleeping.   Exacerbating factors: Sitting, Standing, Bending, and Flexing.    Mitigating factors nothing.     Interval History 3/18/2024:   Irvin Murilloder  is a 49 y.o. male presents to the clinic for follow up.  Since last visit the pain has has worsened. He has been having to travel to [place] a lot recently due to his son-in-law getting in to a MVA and helping out. His right hip has been bothering him more recently    The pain is located in the groin area and radiates to the right hip, left knee and ankles .  The pain is described as aching    At BEST  8/10   At WORST  10/10 on the WORST day.    On average pain is rated as 8/10.   Today the pain is rated as 8/10  Symptoms interfere with daily activity and sleeping.   Exacerbating factors: Sitting, Standing, Bending, and Flexing.    Mitigating factors medications.     Interval History 9/13/2023:   Irvin Estradaruder  is a 49 y.o. male presents to the clinic for follow up.  Since last visit the pain has has worsened.    The pain is located in the groin, left knee, and both ankles area and radiates to the knee and ankle .  The pain is described as aching    At BEST  8/10   At WORST  10/10 on the WORST day.    On average pain is rated as 8/10.   Today the pain is rated as 8/10  Symptoms interfere with daily activity and sleeping.   Exacerbating factors: Sitting, Standing, Bending, Walking, and Flexing.    Mitigating factors medications.     Interval History 6/21/2023:   Irvin WOOD Joe ArellanoDavid is a 49 y.o. male presents to the clinic for follow up.  Since last visit the pain has has improved in some ways and worsened in others. The right hip has been bothering him  more now. States that his right leg is clicking.     The pain is located in the left hip area and radiates to the right hip and down to his left knee .  The pain is described as aching and throbbing    At BEST  7/10   At WORST  9/10 on the WORST day.    On average pain is rated as 8/10.   Today the pain is rated as 8/10  Symptoms interfere with daily activity.   Exacerbating factors: Bending.    Mitigating factors medications (tramadol not oxycodone).     Interval History 1/25/2023:     Irvin Diaz Jr. is a 49 y.o. male presents to the clinic for follow up.  Since last visit the pain has has moderately improved. Reports that he went on a cruise and noticed his pain was doing better.  He has stopped using the cane because it has been better.  He went back to tramadol because he did not think he required the oxycodone. Pain 8/10 today. Feels like a tolerable 8.    Interval History 10/31/2022:     Irvin Diaz Jr. is a 49 y.o. male presents to the clinic for follow up.  Since last visit the pain has has significantly worsened.    The pain is located in the L hip area and radiates to the L foot .  The pain is described as aching, dull, shooting, stabbing, tight band, and tingling    At BEST  8/10   At WORST  10/10 on the WORST day.    On average pain is rated as 7/10.   Today the pain is rated as 8/10  Symptoms interfere with daily activity, sleeping, and work.   Exacerbating factors: Standing, Bending, Coughing/Sneezing, Walking, and Getting out of bed/chair.    Mitigating factors nothing and medications.     Interval History 9/7/2022:     Irvin Diaz Jr. is a 49 y.o. male presents to the clinic for follow up.  Since last visit the pain has has worsened. He is now using a walker full time for the last 2 weeks. In a wheelchair today. He is unable to describe if his limited mobility is due to just pain or maybe a component of weakness.     The pain is located in the L hip area and radiates to the L  foot .  The pain is described as aching, shooting, and stabbing    At BEST  7/10   At WORST  10/10 on the WORST day.    On average pain is rated as 8/10.   Today the pain is rated as 10/10  Symptoms interfere with daily activity, sleeping, and work.   Exacerbating factors: Standing, Laying, Bending, Walking, Lifting, and Getting out of bed/chair.    Mitigating factors none.     Interval History 8/2/2022:     Irvin Diaz Jr. is a 49 y.o. male presents to the clinic for follow up.  Since last visit the pain has is unchanged.  He stopped the Oxycodone because it gave severe, lucid dreams.  Ran out of tramadol and has not been taking anything for his pain.    The pain is located in the left hip area and radiates to the left knee/ groin .  The pain is described as aching, shooting and stabbing    At BEST  8/10   At WORST  10/10 on the WORST day.    On average pain is rated as 8/10.   Today the pain is rated as 8/10  Symptoms interfere with daily activity, sleeping and work.   Exacerbating factors: Standing, Walking and Getting out of bed/chair.    Mitigating factors nothing, heat, ice, medications and rest.     Interval History 7/5/2022:     Irvin Diaz Jr. is a 49 y.o. male presents to the clinic for follow up.  Since last visit the pain has is unchanged.  He is s/p hip block that was complicated by hematoma in the pectineus muscle.  His Hgb has dropped, although the hematoma is resolving.  Recommended that patient go to ED as recommended by transplant.    The pain is located in the left hip area and radiates to the left leg/ groin  .  The pain is described as aching, shooting and stabbing    At BEST  8/10   At WORST  10/10 on the WORST day.    On average pain is rated as 8/10.   Today the pain is rated as 8/10  Symptoms interfere with daily activity, sleeping and work.   Exacerbating factors: Standing, Walking and Getting out of bed/chair.    Mitigating factors nothing, heat, ice, medications and rest.      Interval History 5/30/2022:     Irvin Diaz Jr. is a 49 y.o. male presents to the clinic for follow up.  Since last visit the pain has is unchanged. He missed his original procedure date due to not checking his voicemail and transportation issues. He is still interested in the procedure.  WE had a long talk about bleeding risk with his low platelets and high INR.    The pain is located in the left hip area and radiates to the left leg/groin .  The pain is described as aching, shooting and stabbing    At BEST  8/10   At WORST  10/10 on the WORST day.    On average pain is rated as 8/10.   Today the pain is rated as 8/10  Symptoms interfere with daily activity, sleeping and work.   Exacerbating factors: walking.    Mitigating factors nothing.     Initial Hx:  Irvin Diaz Jr. is a 49 y.o. male presents to the clinic for the evaluation of left hip pain. The pain started 3 years ago following fall and symptoms have been worsening. The patient states used to get hip injections of the left hip.  He is unable to walk without a cane.  He states that he was told after imaging that he had a fracture in 2019.  He had an injection (and aspiration) in December/january and it helps the pain for about 3 weeks.  After the injections he still needs the pain but it helps.  The ortho physician at [facility]    CT note about the hip:  There is collapse of the left femoral head superior portion with bone-on-bone as well as underlying femoral sclerotic changes suggesting AVN with severe secondary degenerative changes of the acetabulum and the left hip effusion stable since prior exam.     The patient says that he has seen a ortho surgeon in the past and that they are unable to do surgery due to his liver failure.    Pain Description:    The pain is located in the left hip area and radiates to the left leg/ groin area .    At BEST  8/10   At WORST  10/10 on the WORST day.    On average pain is rated as  8/10.   Today the pain is rated as 8/10  The pain is continuous.  The pain is described as aching, shooting and throbbing    Symptoms interfere with daily activity, sleeping and work.   Exacerbating factors: Standing, Laying, Bending, Walking, Night Time, Morning, Flexing, Lifting and Getting out of bed/chair.    Mitigating factors laying down and medications.   He reports 5 hours of sleep per night.    Physical Therapy/Home Exercise: No, not currently in physical therapy or home exercise program    Current Pain Medications:    - none    Failed Pain Medications:    - cannot take NSAIDs due to gastric bleeding, cannot take tylenol due to liver failure.     Pain Treatment Therapies:    Pain procedures: hip injections  Physical Therapy: physical therapy made things worse  Chiropractor: none  Acupuncture: none  TENS unit: none  Spinal decompression: none  Joint replacement: none    Patient denies urinary incontinence, bowel incontinence and loss of sensations. (+) weakness in the left leg  Patient denies any suicidal or homicidal ideations     report:  Reviewed and consistent with medication use as prescribed.    Imaging:   XR abdomen 03/2022:  Please note the hemidiaphragms are not included in their entirety.  Multiple surgical clips and presumed anastomotic suture line project over the right abdomen.  There are a few mildly prominent loops of gas-filled small bowel loops present measuring up to 3.3 cm in the left abdomen.  Limited evaluation of free intraperitoneal air to patient positioning/technique.  Calcifications project over the pelvis, likely phleboliths.  Osseous structures demonstrate significant degenerative change of the left hip with chronic appearing deformity/collapse of the left femoral head.    CT abdomen 03/2022:  Mild circumferential wall thickening involving the proximal colon extending from the ileocolic anastomosis at the hepatic flexure through around the level of the splenic flexure suggests  inflammatory or infectious colitis.  No convincing transmural inflammation is seen.     Postoperative changes of proximal colectomy and scattered mild diverticulosis of the more distal colon.  No convincing diverticulitis, bowel obstruction, free air or abscess.     Findings of cirrhosis and mild abdominal ascites as described essentially stable.     Left hip findings suggesting AVN as described.     This report was flagged in Epic as abnormal.     No other significant or acute findings.       Past Medical History:   Diagnosis Date    Alcohol withdrawal 5/1/2022    Alcoholic cirrhosis of liver     Alcoholic ketoacidosis 6/6/2021    Arthritis     ATN (acute tubular necrosis)     CHF (congestive heart failure)     Diverticulitis 01/2020    Esophageal varices     GERD (gastroesophageal reflux disease)     GI bleed     Hip arthritis     Left    Hypertension     Liver cirrhosis     Macrocytic anemia     Pulmonary embolism 08/2018    Unprovoked DVT.  Stop Coumadin due to GIB    Thrombocytopenia      Past Surgical History:   Procedure Laterality Date    ANKLE SURGERY      BLOCK, NERVE, PERIPHERAL Left 06/24/2022    Procedure: Left Hip femoral-obturator accessory nerve block;  Surgeon: Robbin De La Garza DO;  Location: [facility] OR;  Service: Pain Management;  Laterality: Left;    COLON SURGERY  2007    COLONOSCOPY  09/05/2019    [facility]    COLONOSCOPY N/A 02/17/2021    Procedure: COLONOSCOPY;  Surgeon: Renea Billingsley MD;  Location: [facility];  Service: Endoscopy;  Laterality: N/A;    COLONOSCOPY N/A 2/13/2023    Procedure: COLONOSCOPY;  Surgeon: Rudy Orellana MD;  Location: [facility] ([address] [place] [address]);  Service: Endoscopy;  Laterality: N/A;  cirrhosis-labs done on 1/11/23  instr portal-GT  No answer for precall- [place]    COLONOSCOPY N/A 3/10/2023    Procedure: COLONOSCOPY;  Surgeon: Rudy Orellana MD;  [facility]  Service: Endoscopy;  Laterality: N/A;  inst via poral per pt request    COLONOSCOPY W/ BIOPSIES  02/17/2021     ESOPHAGOGASTRODUODENOSCOPY N/A 07/26/2019    Procedure: EGD (ESOPHAGOGASTRODUODENOSCOPY);  Surgeon: Brian Trivedi MD;  Location: [facility];  Service: Endoscopy;  Laterality: N/A;    ESOPHAGOGASTRODUODENOSCOPY N/A 04/08/2020    Procedure: EGD (ESOPHAGOGASTRODUODENOSCOPY);  Surgeon: Donn Acuna MD;  Location: [facility];  Service: Endoscopy;  Laterality: N/A;    ESOPHAGOGASTRODUODENOSCOPY N/A 01/03/2021    Procedure: EGD (ESOPHAGOGASTRODUODENOSCOPY)- coffee ground emesis, hx varices;  Surgeon: Steve Chairez MD;  Location: [facility];  Service: Endoscopy;  Laterality: N/A;    ESOPHAGOGASTRODUODENOSCOPY N/A 05/03/2021    Procedure: EGD (ESOPHAGOGASTRODUODENOSCOPY);  Surgeon: Jeanmarie Ngo MD;  Location: [facility];  Service: Endoscopy;  Laterality: N/A;    ESOPHAGOGASTRODUODENOSCOPY N/A 07/19/2021    Procedure: ESOPHAGOGASTRODUODENOSCOPY (EGD);  Surgeon: Claudio Medeiros MD;  Location: [facility];  Service: Endoscopy;  Laterality: N/A;    ESOPHAGOGASTRODUODENOSCOPY  12/20/2021    ESOPHAGOGASTRODUODENOSCOPY N/A 12/20/2021    Procedure: EGD (ESOPHAGOGASTRODUODENOSCOPY);  Surgeon: Ajay Jackson MD;  Location: [facility];  Service: Endoscopy;  Laterality: N/A;    ESOPHAGOGASTRODUODENOSCOPY N/A 05/03/2022    Procedure: EGD (ESOPHAGOGASTRODUODENOSCOPY);  Surgeon: Brian Trivedi MD;  Location: [facility];  Service: Endoscopy;  Laterality: N/A;    ESOPHAGOGASTRODUODENOSCOPY N/A 7/7/2022    Procedure: EGD (ESOPHAGOGASTRODUODENOSCOPY);  Surgeon: Ajay Jackson MD;  Location: [facility];  Service: Endoscopy;  Laterality: N/A;    ESOPHAGOGASTRODUODENOSCOPY N/A 11/29/2022    Procedure: EGD (ESOPHAGOGASTRODUODENOSCOPY);  Surgeon: Edouard Maynard MD;  Location: [facility] ([address] [place] [address]);  Service: Endoscopy;  Laterality: N/A;    ESOPHAGOGASTRODUODENOSCOPY N/A 4/28/2023    Procedure: EGD (ESOPHAGOGASTRODUODENOSCOPY);  Surgeon: Caesar Dickens MD;  Location: [facility];  Service: Endoscopy;  Laterality: N/A;     ESOPHAGOGASTRODUODENOSCOPY N/A 3/28/2024    Procedure: EGD (ESOPHAGOGASTRODUODENOSCOPY);  Surgeon: Jeanmarie Ngo MD;  Location: [facility];  Service: Endoscopy;  Laterality: N/A;    HERNIA REPAIR Left     Inguinal    INJECTION OF JOINT Right 2023    Procedure: RT Hip Injection;  Surgeon: Robbin De La Garza DO;  Location: [facility];  Service: Pain Management;  Laterality: Right;  oral - 20 mins    UPPER GASTROINTESTINAL ENDOSCOPY N/A 2022     Social History     Socioeconomic History    Marital status:    Tobacco Use    Smoking status: Former     Current packs/day: 0.00     Types: Cigarettes     Quit date: 2000     Years since quittin.7    Smokeless tobacco: Never    Tobacco comments:     quit 20 years ago   Substance and Sexual Activity    Alcohol use: Not Currently     Comment: Quit drinking 21    Drug use: Not Currently    Sexual activity: Yes     Comment: occ     Social Determinants of Health     Financial Resource Strain: Low Risk  (2024)    Overall Financial Resource Strain (CARDIA)     Difficulty of Paying Living Expenses: Not hard at all   Food Insecurity: No Food Insecurity (2024)    Hunger Vital Sign     Worried About Running Out of Food in the Last Year: Never true     Ran Out of Food in the Last Year: Never true   Transportation Needs: No Transportation Needs (2024)    PRAPARE - Transportation     Lack of Transportation (Medical): No     Lack of Transportation (Non-Medical): No   Physical Activity: Inactive (2024)    Exercise Vital Sign     Days of Exercise per Week: 0 days     Minutes of Exercise per Session: 0 min   Stress: No Stress Concern Present (2024)    [employer] - Occupational Stress Questionnaire     Feeling of Stress : Not at all   Housing Stability: Unknown (2024)    Housing Stability Vital Sign     Unable to Pay for Housing in the Last Year: No     Family History   Problem Relation Name  Age of Onset    Hypertension Mother      Breast cancer Mother      Hypertension Father      Prostate cancer Father      Bladder Cancer Father         Review of patient's allergies indicates:   Allergen Reactions    Ciprofloxacin hcl Hallucinations    Meperidine Hives     Pt medicated w/multiple medications (demerol, protonix, and zofran)  w/i 10 mins time frame prior to developing localized hives near IV site that med was administered. Pt reports he has/takes all other medications on daily basis.     Morphine Itching    Nsaids (non-steroidal anti-inflammatory drug)      Kidney Disease    Tylenol [acetaminophen]      Hx of liver disease       Current Outpatient Medications   Medication Sig    ferrous gluconate (FERGON) 240 (27 FE) MG tablet Take 2 tablets (480 mg total) by mouth 2 (two) times daily with meals.    folic acid (FOLVITE) 1 MG tablet Take 1 tablet (1 mg total) by mouth once daily.    metoprolol tartrate (LOPRESSOR) 50 MG tablet Take 1 tablet (50 mg total) by mouth 2 (two) times daily. Do not take if heart rate is less than 60    rifAXIMin (XIFAXAN) 550 mg Tab Take 1 tablet (550 mg total) by mouth 2 (two) times daily.    sildenafiL (VIAGRA) 50 MG tablet Take 50 mg by mouth daily as needed for Erectile Dysfunction.    spironolactone (ALDACTONE) 25 MG tablet Take 1 tablet (25 mg total) by mouth once daily.    tadalafiL (CIALIS) 20 MG Tab Take 1 tablet (20 mg total) by mouth daily as needed (erectile dysfunction).    thiamine 100 MG tablet Take 1 tablet (100 mg total) by mouth once daily.    [START ON 10/7/2024] traMADoL (ULTRAM) 50 mg tablet Take 1 tablet (50 mg total) by mouth 4 (four) times daily as needed for Pain.     No current facility-administered medications for this visit.       REVIEW OF SYSTEMS:    GENERAL:  No weight loss, malaise or fevers.   HEENT:   No recent changes in vision or hearing   NECK:  Negative for lumps, no difficulty with swallowing.  RESPIRATORY:  Negative for cough, wheezing or  "shortness of breath, patient denies any recent URI.  CARDIOVASCULAR:  Negative for chest pain, leg swelling or palpitations.  GI:  Negative for abdominal discomfort, blood in stools or black stools or change in bowel habits.  MUSCULOSKELETAL:  See HPI.  SKIN:  Negative for lesions, rash, and itching. + skin itching  PSYCH:  No mood disorder or recent psychosocial stressors.  Patients sleep is not disturbed secondary to pain.  HEMATOLOGY/LYMPHOLOGY:  Negative for prolonged bleeding, bruising easily or swollen nodes.  Patient is not currently taking any anti-coagulants  NEURO:   No history of headaches, syncope, paralysis, seizures or tremors.  All other reviewed and negative other than HPI.    OBJECTIVE:    BP (!) 145/82 (BP Location: Left arm, Patient Position: Sitting)   Pulse 65   Ht 5' 10.2" (1.783 m)   Wt 105.2 kg (231 lb 14.8 oz)   BMI 33.09 kg/m²     PHYSICAL EXAMINATION:    GENERAL: Fraile, in no acute distress, alert and oriented x3.  PSYCH:  Mood and affect appropriate.  SKIN: Skin color, texture, turgor normal, no rashes or lesions on visible skin.  HEAD/FACE:  Normocephalic, atraumatic. Cranial nerves grossly intact.  CV: RRR with palpation of the radial artery.  PULM: CTAB. No evidence of respiratory difficulty, symmetric chest rise.  GI:  Soft    MUSKULOSKELETAL:    EXTREMITIES:   Left Hip Exam (limited ROM and exam 2/2/ pain)  - Log Roll Did not perform  - FADIR Did not perform  - Stinchfield Did not perform  - Hip Scour Did not perform  - GTB Tenderness Positive   -TTP over anterior hip joint. Posterior not palpated  - TTP of the superior knee joint.    Right hip exam:  (+) log roll  (+) fadir  (+) TTP of the right groin    Left knee:  TTP right medial knee  (-) edema  (-) crepitus  (+) pain with end ROM    MUSCULOSKELETAL:  Atrophy of the left leg compared to the right  No deformities, edema, or skin discoloration are noted on visible skin. Good capillary refill.     NEURO: Bilateral upper and " lower extremity coordination and muscle stretch reflexes are physiologic and symmetric.      NEUROLOGICAL EXAM:  MENTAL STATUS: A x O x 3, good concentration, speech is fluent and goal directed  MEMORY: recent and remote are intact  CN: CN2-12 grossly intact  MOTOR: 5/5 in all muscle groups on the right. HF 3/5 on the left, 4/5 KE, 4/5 KF with pain  DTRs: 3+ intact symmetric patella and 2 + achilles  Sensation:    -yes Loss of sensation in a left lower L-1 and L-2 on the left distribution.  Babinski: absent     Gait: Cane, abnormal. Short stride. Favors left leg

## 2024-09-26 NOTE — H&P (VIEW-ONLY)
Chronic Pain - f/u    Referring Physician: No ref. provider found    Date: 09/26/2024     Re: Irvin Diaz Jr.  MR#: [id number]  YOB: 1975  Age: 49 y.o.    Chief Complaint: hip pain  Chief Complaint   Patient presents with    Follow-up    Knee Pain     **This note is dictated using the M*Modal Fluency Direct word recognition program. There are word recognition mistakes that are occasionally missed on review.**    ASSESSMENT: 49 y.o. year old male with left hip pain, consistent with     1. Primary osteoarthritis of left knee        2. Avascular necrosis of hip, left  traMADoL (ULTRAM) 50 mg tablet      3. Chronic, continuous use of opioids  traMADoL (ULTRAM) 50 mg tablet      4. Opioid dependence, uncomplicated        5. Primary osteoarthritis of right hip  Procedure Order to Pain Management      6. Chronic pain syndrome  traMADoL (ULTRAM) 50 mg tablet        PLAN:     Left knee pain likely OA 2/2 altered mechanics due to hip AVN  -pain with varus/valgus strain  -left knee x-ray shows KL grade 2 OA with medial joint space narrowing  -minimal edema. Pain worse with weight bearing.  -6/20/24 - knee injection CSI - 70%x1m  -He has failed PT in the knee before.  -He uses a knee brace.  -failed PT. Failed conservative therapy.  Cannot take NSAIDs due to low platelets  -MEDICAL NECESSITY FOR VISCOSUPPLEMENTATION USE: After thorough evaluation of the patient, I have determined that viscosupplementation treatment is medically necessary. The patient has painful degenerative joint disease (DJD) of the knee(s) with failure of conservative treatments including lifestyle modifications and rehabilitation exercises. Oral analgesics including NSAIDs have not adequately controlled the patient's symptoms or are contraindicated. There is radiographic evidence of Kellgren-Maury grade II (or greater) osteoarthritic (OA) changes, or if lack of radiographic evidence, there is arthroscopic or other evidence of  chondrosis of the knee(s).   10/10/24 - L knee synvisc-one inj.    Avascular necrosis of the left hip   -s/p hip block without significant pain relief and complicated by Hematoma.   -tolerating oxycodone 5 mg BID PRN (he thinks his lucid dreams were due to EtOH withdrawal). No more lucid dreams. Switch back to tramadol  -switched back to tramadol because he has been doing better and does not think that he needs the oxycodone anymore.  -refill Tramadol QID. This has been working for him. Helps him function. Refill x3 months  - Medrol dose pack, helped while taking, but not sustained.  -not surgical candidate  -Not a candidate for further procedures due to bleeding risk  - Plts improved to ~110.  -buprenorphine likely not an option 2/2 to his ESLD and liver transplant status    Right hip pain  -XR Right hip given hx of avascular necrosis in the left hip. This showed moderate OA in the hip  9/28/23 - right hip injection - >50% relief for 6 months  10/8/24 - Right hip injection - no sed - no AC  -recommended minimizing repeats due to risks unless the pain becomes unbearable.    Allodynia  -stopped Nortriptyline to 50 mg nightly. Helps him sleep, but made him nauseous    Thorombocytopenia 2/2 cirrhosis  -platelets are 92 on 5/26/22, 82 on 9/7/22, 74 on 10/25/22, 110 on 5/18/23  -Avanos recommended above 50, but he had hematoma, so will not proceed.    Chronic Opioid Use  - UDS from October and September did not show opioids but he states he was decreasing use at that time.  -no UDS today, He had a drug screen 05/2023 which was appropriate.  -new UDS 6/20/24 since the one in March 2024 did not show tramadol.  UDS 6/2024 showed MJ.  UDS in 09/2024 was appropriate.    Cirrhosis 2/2 past EtOH. He states he is not drinking anymore.  -following with hepatology  -possible liver transplant?  -working with psychiatry for EtOH.    Kidney disease  -GFR 54    - RTC before 1/5/24  - Counseled patient regarding the importance of   activity modification.    The above plan and management options were discussed at length with patient. Patient is in agreement with the above and verbalized understanding. It will be communicated with the referring physician via electronic record, fax, or mail.  Lab/study reports reviewed were important and necessary because subsequent medical and treatment recommendations required review of the above lab/study reports. Images viewed/reviewed above were important and necessary because subsequent medical and treatment recommendations required review of the reviewed image(s).     Electronically signed by:  Robbin De La Garza DO  09/26/2024    =========================================================================================================    SUBJECTIVE:    Interval History 9/26/2024:     Irvin Diaz Jr. is a 49 y.o. male presents to the clinic for follow up.  Since last visit the pain has is unchanged.    The pain is located in the left hip area and radiates to the left knee and ankles .  The pain is described as aching, shooting, and stabbing    At BEST  8/10   At WORST  10/10 on the WORST day.    On average pain is rated as 8/10.   Today the pain is rated as 8/10  Symptoms interfere with daily activity and sleeping.   Exacerbating factors: Sitting, Standing, Bending, and Flexing.    Mitigating factors nothing.     Current pain medications: Tramadol 50mg QID  Failed Pain Medications: oxycodone, tramadol, gabapentin, cannot take NSAIDs due to gastric bleeding, cannot take tylenol due to liver failure.     Pain procedures:  6/24/22 - left hip block - no relief. C/b hematoma formation  10/10/23 - Right hip injection - oral sed - no AC - >50% @ 6m  6/20/24 - L Knee - 70%x1m  10/8/24 - Right hip injection - no sed - no AC  10/10/24 - L knee synvisc-1 inj.    Interval History 6/20/2024:    is a 48 y.o. male presents to the clinic for follow up.  Since last visit the pain has is unchanged.    The pain is  located in the left Hip area and radiates to the left knee and ankles.  The pain is described as aching, shooting, and stabbing    At BEST  8/10   At WORST  10/10 on the WORST day.    On average pain is rated as 8/10.   Today the pain is rated as 8/10  Symptoms interfere with daily activity and sleeping.   Exacerbating factors: Sitting, Standing, Bending, and Flexing.    Mitigating factors nothing.     Interval History 3/18/2024:   Irvin Murilloder  is a 49 y.o. male presents to the clinic for follow up.  Since last visit the pain has has worsened. He has been having to travel to [place] a lot recently due to his son-in-law getting in to a MVA and helping out. His right hip has been bothering him more recently    The pain is located in the groin area and radiates to the right hip, left knee and ankles .  The pain is described as aching    At BEST  8/10   At WORST  10/10 on the WORST day.    On average pain is rated as 8/10.   Today the pain is rated as 8/10  Symptoms interfere with daily activity and sleeping.   Exacerbating factors: Sitting, Standing, Bending, and Flexing.    Mitigating factors medications.     Interval History 9/13/2023:   Irvin Estradaruder  is a 49 y.o. male presents to the clinic for follow up.  Since last visit the pain has has worsened.    The pain is located in the groin, left knee, and both ankles area and radiates to the knee and ankle .  The pain is described as aching    At BEST  8/10   At WORST  10/10 on the WORST day.    On average pain is rated as 8/10.   Today the pain is rated as 8/10  Symptoms interfere with daily activity and sleeping.   Exacerbating factors: Sitting, Standing, Bending, Walking, and Flexing.    Mitigating factors medications.     Interval History 6/21/2023:   Irvin WOOD Joe ArellanoDavid is a 49 y.o. male presents to the clinic for follow up.  Since last visit the pain has has improved in some ways and worsened in others. The right hip has been bothering him  more now. States that his right leg is clicking.     The pain is located in the left hip area and radiates to the right hip and down to his left knee .  The pain is described as aching and throbbing    At BEST  7/10   At WORST  9/10 on the WORST day.    On average pain is rated as 8/10.   Today the pain is rated as 8/10  Symptoms interfere with daily activity.   Exacerbating factors: Bending.    Mitigating factors medications (tramadol not oxycodone).     Interval History 1/25/2023:     Irvin Diaz Jr. is a 49 y.o. male presents to the clinic for follow up.  Since last visit the pain has has moderately improved. Reports that he went on a cruise and noticed his pain was doing better.  He has stopped using the cane because it has been better.  He went back to tramadol because he did not think he required the oxycodone. Pain 8/10 today. Feels like a tolerable 8.    Interval History 10/31/2022:     Irvin Diaz Jr. is a 49 y.o. male presents to the clinic for follow up.  Since last visit the pain has has significantly worsened.    The pain is located in the L hip area and radiates to the L foot .  The pain is described as aching, dull, shooting, stabbing, tight band, and tingling    At BEST  8/10   At WORST  10/10 on the WORST day.    On average pain is rated as 7/10.   Today the pain is rated as 8/10  Symptoms interfere with daily activity, sleeping, and work.   Exacerbating factors: Standing, Bending, Coughing/Sneezing, Walking, and Getting out of bed/chair.    Mitigating factors nothing and medications.     Interval History 9/7/2022:     Irvin Diaz Jr. is a 49 y.o. male presents to the clinic for follow up.  Since last visit the pain has has worsened. He is now using a walker full time for the last 2 weeks. In a wheelchair today. He is unable to describe if his limited mobility is due to just pain or maybe a component of weakness.     The pain is located in the L hip area and radiates to the L  foot .  The pain is described as aching, shooting, and stabbing    At BEST  7/10   At WORST  10/10 on the WORST day.    On average pain is rated as 8/10.   Today the pain is rated as 10/10  Symptoms interfere with daily activity, sleeping, and work.   Exacerbating factors: Standing, Laying, Bending, Walking, Lifting, and Getting out of bed/chair.    Mitigating factors none.     Interval History 8/2/2022:     Irvin Diaz Jr. is a 49 y.o. male presents to the clinic for follow up.  Since last visit the pain has is unchanged.  He stopped the Oxycodone because it gave severe, lucid dreams.  Ran out of tramadol and has not been taking anything for his pain.    The pain is located in the left hip area and radiates to the left knee/ groin .  The pain is described as aching, shooting and stabbing    At BEST  8/10   At WORST  10/10 on the WORST day.    On average pain is rated as 8/10.   Today the pain is rated as 8/10  Symptoms interfere with daily activity, sleeping and work.   Exacerbating factors: Standing, Walking and Getting out of bed/chair.    Mitigating factors nothing, heat, ice, medications and rest.     Interval History 7/5/2022:     Irvin Diaz Jr. is a 49 y.o. male presents to the clinic for follow up.  Since last visit the pain has is unchanged.  He is s/p hip block that was complicated by hematoma in the pectineus muscle.  His Hgb has dropped, although the hematoma is resolving.  Recommended that patient go to ED as recommended by transplant.    The pain is located in the left hip area and radiates to the left leg/ groin  .  The pain is described as aching, shooting and stabbing    At BEST  8/10   At WORST  10/10 on the WORST day.    On average pain is rated as 8/10.   Today the pain is rated as 8/10  Symptoms interfere with daily activity, sleeping and work.   Exacerbating factors: Standing, Walking and Getting out of bed/chair.    Mitigating factors nothing, heat, ice, medications and rest.      Interval History 5/30/2022:     Irvin Diaz Jr. is a 49 y.o. male presents to the clinic for follow up.  Since last visit the pain has is unchanged. He missed his original procedure date due to not checking his voicemail and transportation issues. He is still interested in the procedure.  WE had a long talk about bleeding risk with his low platelets and high INR.    The pain is located in the left hip area and radiates to the left leg/groin .  The pain is described as aching, shooting and stabbing    At BEST  8/10   At WORST  10/10 on the WORST day.    On average pain is rated as 8/10.   Today the pain is rated as 8/10  Symptoms interfere with daily activity, sleeping and work.   Exacerbating factors: walking.    Mitigating factors nothing.     Initial Hx:  Irvin Diaz Jr. is a 49 y.o. male presents to the clinic for the evaluation of left hip pain. The pain started 3 years ago following fall and symptoms have been worsening. The patient states used to get hip injections of the left hip.  He is unable to walk without a cane.  He states that he was told after imaging that he had a fracture in 2019.  He had an injection (and aspiration) in December/january and it helps the pain for about 3 weeks.  After the injections he still needs the pain but it helps.  The ortho physician at [facility]    CT note about the hip:  There is collapse of the left femoral head superior portion with bone-on-bone as well as underlying femoral sclerotic changes suggesting AVN with severe secondary degenerative changes of the acetabulum and the left hip effusion stable since prior exam.     The patient says that he has seen a ortho surgeon in the past and that they are unable to do surgery due to his liver failure.    Pain Description:    The pain is located in the left hip area and radiates to the left leg/ groin area .    At BEST  8/10   At WORST  10/10 on the WORST day.    On average pain is rated as  8/10.   Today the pain is rated as 8/10  The pain is continuous.  The pain is described as aching, shooting and throbbing    Symptoms interfere with daily activity, sleeping and work.   Exacerbating factors: Standing, Laying, Bending, Walking, Night Time, Morning, Flexing, Lifting and Getting out of bed/chair.    Mitigating factors laying down and medications.   He reports 5 hours of sleep per night.    Physical Therapy/Home Exercise: No, not currently in physical therapy or home exercise program    Current Pain Medications:    - none    Failed Pain Medications:    - cannot take NSAIDs due to gastric bleeding, cannot take tylenol due to liver failure.     Pain Treatment Therapies:    Pain procedures: hip injections  Physical Therapy: physical therapy made things worse  Chiropractor: none  Acupuncture: none  TENS unit: none  Spinal decompression: none  Joint replacement: none    Patient denies urinary incontinence, bowel incontinence and loss of sensations. (+) weakness in the left leg  Patient denies any suicidal or homicidal ideations     report:  Reviewed and consistent with medication use as prescribed.    Imaging:   XR abdomen 03/2022:  Please note the hemidiaphragms are not included in their entirety.  Multiple surgical clips and presumed anastomotic suture line project over the right abdomen.  There are a few mildly prominent loops of gas-filled small bowel loops present measuring up to 3.3 cm in the left abdomen.  Limited evaluation of free intraperitoneal air to patient positioning/technique.  Calcifications project over the pelvis, likely phleboliths.  Osseous structures demonstrate significant degenerative change of the left hip with chronic appearing deformity/collapse of the left femoral head.    CT abdomen 03/2022:  Mild circumferential wall thickening involving the proximal colon extending from the ileocolic anastomosis at the hepatic flexure through around the level of the splenic flexure suggests  inflammatory or infectious colitis.  No convincing transmural inflammation is seen.     Postoperative changes of proximal colectomy and scattered mild diverticulosis of the more distal colon.  No convincing diverticulitis, bowel obstruction, free air or abscess.     Findings of cirrhosis and mild abdominal ascites as described essentially stable.     Left hip findings suggesting AVN as described.     This report was flagged in Epic as abnormal.     No other significant or acute findings.       Past Medical History:   Diagnosis Date    Alcohol withdrawal 5/1/2022    Alcoholic cirrhosis of liver     Alcoholic ketoacidosis 6/6/2021    Arthritis     ATN (acute tubular necrosis)     CHF (congestive heart failure)     Diverticulitis 01/2020    Esophageal varices     GERD (gastroesophageal reflux disease)     GI bleed     Hip arthritis     Left    Hypertension     Liver cirrhosis     Macrocytic anemia     Pulmonary embolism 08/2018    Unprovoked DVT.  Stop Coumadin due to GIB    Thrombocytopenia      Past Surgical History:   Procedure Laterality Date    ANKLE SURGERY      BLOCK, NERVE, PERIPHERAL Left 06/24/2022    Procedure: Left Hip femoral-obturator accessory nerve block;  Surgeon: Robbin De La Garza DO;  Location: [facility] OR;  Service: Pain Management;  Laterality: Left;    COLON SURGERY  2007    COLONOSCOPY  09/05/2019    [facility]    COLONOSCOPY N/A 02/17/2021    Procedure: COLONOSCOPY;  Surgeon: Renea Billingsley MD;  Location: [facility];  Service: Endoscopy;  Laterality: N/A;    COLONOSCOPY N/A 2/13/2023    Procedure: COLONOSCOPY;  Surgeon: Rudy Orellana MD;  Location: [facility] ([address] [place] [address]);  Service: Endoscopy;  Laterality: N/A;  cirrhosis-labs done on 1/11/23  instr portal-GT  No answer for precall- [place]    COLONOSCOPY N/A 3/10/2023    Procedure: COLONOSCOPY;  Surgeon: Rudy Orellana MD;  [facility]  Service: Endoscopy;  Laterality: N/A;  inst via poral per pt request    COLONOSCOPY W/ BIOPSIES  02/17/2021     ESOPHAGOGASTRODUODENOSCOPY N/A 07/26/2019    Procedure: EGD (ESOPHAGOGASTRODUODENOSCOPY);  Surgeon: Brian Trivedi MD;  Location: [facility];  Service: Endoscopy;  Laterality: N/A;    ESOPHAGOGASTRODUODENOSCOPY N/A 04/08/2020    Procedure: EGD (ESOPHAGOGASTRODUODENOSCOPY);  Surgeon: Donn Acuna MD;  Location: [facility];  Service: Endoscopy;  Laterality: N/A;    ESOPHAGOGASTRODUODENOSCOPY N/A 01/03/2021    Procedure: EGD (ESOPHAGOGASTRODUODENOSCOPY)- coffee ground emesis, hx varices;  Surgeon: Steve Chairez MD;  Location: [facility];  Service: Endoscopy;  Laterality: N/A;    ESOPHAGOGASTRODUODENOSCOPY N/A 05/03/2021    Procedure: EGD (ESOPHAGOGASTRODUODENOSCOPY);  Surgeon: Jeanmarie Ngo MD;  Location: [facility];  Service: Endoscopy;  Laterality: N/A;    ESOPHAGOGASTRODUODENOSCOPY N/A 07/19/2021    Procedure: ESOPHAGOGASTRODUODENOSCOPY (EGD);  Surgeon: Claudio Medeiros MD;  Location: [facility];  Service: Endoscopy;  Laterality: N/A;    ESOPHAGOGASTRODUODENOSCOPY  12/20/2021    ESOPHAGOGASTRODUODENOSCOPY N/A 12/20/2021    Procedure: EGD (ESOPHAGOGASTRODUODENOSCOPY);  Surgeon: Ajay Jackson MD;  Location: [facility];  Service: Endoscopy;  Laterality: N/A;    ESOPHAGOGASTRODUODENOSCOPY N/A 05/03/2022    Procedure: EGD (ESOPHAGOGASTRODUODENOSCOPY);  Surgeon: Brian Trivedi MD;  Location: [facility];  Service: Endoscopy;  Laterality: N/A;    ESOPHAGOGASTRODUODENOSCOPY N/A 7/7/2022    Procedure: EGD (ESOPHAGOGASTRODUODENOSCOPY);  Surgeon: Ajay Jackson MD;  Location: [facility];  Service: Endoscopy;  Laterality: N/A;    ESOPHAGOGASTRODUODENOSCOPY N/A 11/29/2022    Procedure: EGD (ESOPHAGOGASTRODUODENOSCOPY);  Surgeon: Edouard Maynard MD;  Location: [facility] ([address] [place] [address]);  Service: Endoscopy;  Laterality: N/A;    ESOPHAGOGASTRODUODENOSCOPY N/A 4/28/2023    Procedure: EGD (ESOPHAGOGASTRODUODENOSCOPY);  Surgeon: Caesar Dickens MD;  Location: [facility];  Service: Endoscopy;  Laterality: N/A;     ESOPHAGOGASTRODUODENOSCOPY N/A 3/28/2024    Procedure: EGD (ESOPHAGOGASTRODUODENOSCOPY);  Surgeon: Jeanmarie Ngo MD;  Location: [facility];  Service: Endoscopy;  Laterality: N/A;    HERNIA REPAIR Left     Inguinal    INJECTION OF JOINT Right 2023    Procedure: RT Hip Injection;  Surgeon: Robbin De La Garza DO;  Location: [facility];  Service: Pain Management;  Laterality: Right;  oral - 20 mins    UPPER GASTROINTESTINAL ENDOSCOPY N/A 2022     Social History     Socioeconomic History    Marital status:    Tobacco Use    Smoking status: Former     Current packs/day: 0.00     Types: Cigarettes     Quit date: 2000     Years since quittin.7    Smokeless tobacco: Never    Tobacco comments:     quit 20 years ago   Substance and Sexual Activity    Alcohol use: Not Currently     Comment: Quit drinking 21    Drug use: Not Currently    Sexual activity: Yes     Comment: occ     Social Determinants of Health     Financial Resource Strain: Low Risk  (2024)    Overall Financial Resource Strain (CARDIA)     Difficulty of Paying Living Expenses: Not hard at all   Food Insecurity: No Food Insecurity (2024)    Hunger Vital Sign     Worried About Running Out of Food in the Last Year: Never true     Ran Out of Food in the Last Year: Never true   Transportation Needs: No Transportation Needs (2024)    PRAPARE - Transportation     Lack of Transportation (Medical): No     Lack of Transportation (Non-Medical): No   Physical Activity: Inactive (2024)    Exercise Vital Sign     Days of Exercise per Week: 0 days     Minutes of Exercise per Session: 0 min   Stress: No Stress Concern Present (2024)    [employer] [place] [employer] - Occupational Stress Questionnaire     Feeling of Stress : Not at all   Housing Stability: Unknown (2024)    Housing Stability Vital Sign     Unable to Pay for Housing in the Last Year: No     Family History   Problem Relation Name  Age of Onset    Hypertension Mother      Breast cancer Mother      Hypertension Father      Prostate cancer Father      Bladder Cancer Father         Review of patient's allergies indicates:   Allergen Reactions    Ciprofloxacin hcl Hallucinations    Meperidine Hives     Pt medicated w/multiple medications (demerol, protonix, and zofran)  w/i 10 mins time frame prior to developing localized hives near IV site that med was administered. Pt reports he has/takes all other medications on daily basis.     Morphine Itching    Nsaids (non-steroidal anti-inflammatory drug)      Kidney Disease    Tylenol [acetaminophen]      Hx of liver disease       Current Outpatient Medications   Medication Sig    ferrous gluconate (FERGON) 240 (27 FE) MG tablet Take 2 tablets (480 mg total) by mouth 2 (two) times daily with meals.    folic acid (FOLVITE) 1 MG tablet Take 1 tablet (1 mg total) by mouth once daily.    metoprolol tartrate (LOPRESSOR) 50 MG tablet Take 1 tablet (50 mg total) by mouth 2 (two) times daily. Do not take if heart rate is less than 60    rifAXIMin (XIFAXAN) 550 mg Tab Take 1 tablet (550 mg total) by mouth 2 (two) times daily.    sildenafiL (VIAGRA) 50 MG tablet Take 50 mg by mouth daily as needed for Erectile Dysfunction.    spironolactone (ALDACTONE) 25 MG tablet Take 1 tablet (25 mg total) by mouth once daily.    tadalafiL (CIALIS) 20 MG Tab Take 1 tablet (20 mg total) by mouth daily as needed (erectile dysfunction).    thiamine 100 MG tablet Take 1 tablet (100 mg total) by mouth once daily.    [START ON 10/7/2024] traMADoL (ULTRAM) 50 mg tablet Take 1 tablet (50 mg total) by mouth 4 (four) times daily as needed for Pain.     No current facility-administered medications for this visit.       REVIEW OF SYSTEMS:    GENERAL:  No weight loss, malaise or fevers.   HEENT:   No recent changes in vision or hearing   NECK:  Negative for lumps, no difficulty with swallowing.  RESPIRATORY:  Negative for cough, wheezing or  "shortness of breath, patient denies any recent URI.  CARDIOVASCULAR:  Negative for chest pain, leg swelling or palpitations.  GI:  Negative for abdominal discomfort, blood in stools or black stools or change in bowel habits.  MUSCULOSKELETAL:  See HPI.  SKIN:  Negative for lesions, rash, and itching. + skin itching  PSYCH:  No mood disorder or recent psychosocial stressors.  Patients sleep is not disturbed secondary to pain.  HEMATOLOGY/LYMPHOLOGY:  Negative for prolonged bleeding, bruising easily or swollen nodes.  Patient is not currently taking any anti-coagulants  NEURO:   No history of headaches, syncope, paralysis, seizures or tremors.  All other reviewed and negative other than HPI.    OBJECTIVE:    BP (!) 145/82 (BP Location: Left arm, Patient Position: Sitting)   Pulse 65   Ht 5' 10.2" (1.783 m)   Wt 105.2 kg (231 lb 14.8 oz)   BMI 33.09 kg/m²     PHYSICAL EXAMINATION:    GENERAL: Fraile, in no acute distress, alert and oriented x3.  PSYCH:  Mood and affect appropriate.  SKIN: Skin color, texture, turgor normal, no rashes or lesions on visible skin.  HEAD/FACE:  Normocephalic, atraumatic. Cranial nerves grossly intact.  CV: RRR with palpation of the radial artery.  PULM: CTAB. No evidence of respiratory difficulty, symmetric chest rise.  GI:  Soft    MUSKULOSKELETAL:    EXTREMITIES:   Left Hip Exam (limited ROM and exam 2/2/ pain)  - Log Roll Did not perform  - FADIR Did not perform  - Stinchfield Did not perform  - Hip Scour Did not perform  - GTB Tenderness Positive   -TTP over anterior hip joint. Posterior not palpated  - TTP of the superior knee joint.    Right hip exam:  (+) log roll  (+) fadir  (+) TTP of the right groin    Left knee:  TTP right medial knee  (-) edema  (-) crepitus  (+) pain with end ROM    MUSCULOSKELETAL:  Atrophy of the left leg compared to the right  No deformities, edema, or skin discoloration are noted on visible skin. Good capillary refill.     NEURO: Bilateral upper and " lower extremity coordination and muscle stretch reflexes are physiologic and symmetric.      NEUROLOGICAL EXAM:  MENTAL STATUS: A x O x 3, good concentration, speech is fluent and goal directed  MEMORY: recent and remote are intact  CN: CN2-12 grossly intact  MOTOR: 5/5 in all muscle groups on the right. HF 3/5 on the left, 4/5 KE, 4/5 KF with pain  DTRs: 3+ intact symmetric patella and 2 + achilles  Sensation:    -yes Loss of sensation in a left lower L-1 and L-2 on the left distribution.  Babinski: absent     Gait: Cane, abnormal. Short stride. Favors left leg

## 2024-09-26 NOTE — TELEPHONE ENCOUNTER
----- Message from Mehran Hernandez sent at 9/26/2024  1:22 PM CDT -----  Type: General Call Back     Name of Caller:Pt  Would the patient rather a call back or a response via [facility]? Call back   Best Call Back Number:[phone]   Additional Information: Pt is requesting that the office gives him a call regarding a medication that the provider once prescribed him. He wants to know if he would give it to him again. It was for his eczema and pain.

## 2024-09-27 ENCOUNTER — HOSPITAL ENCOUNTER (OUTPATIENT)
Dept: RADIOLOGY | Facility: CLINIC | Age: 49
Discharge: HOME | End: 2024-09-27
Attending: INTERNAL MEDICINE
Payer: MEDICARE

## 2024-09-27 ENCOUNTER — HOSPITAL ENCOUNTER (OUTPATIENT)
Dept: RADIOLOGY | Facility: HOSPITAL | Age: 49
Discharge: HOME | End: 2024-09-27
Attending: INTERNAL MEDICINE
Payer: MEDICARE

## 2024-09-27 DIAGNOSIS — Z76.82 ORGAN TRANSPLANT CANDIDATE: ICD-10-CM

## 2024-09-27 DIAGNOSIS — Z76.82 AWAITING LIVER TRANSPLANT: ICD-10-CM

## 2024-09-27 LAB
CREAT SERPL-MCNC: 1.8 MG/DL (ref 0.5–1.4)
SAMPLE: ABNORMAL

## 2024-09-27 PROCEDURE — 74183 MRI ABD W/O CNTR FLWD CNTR: CPT | Mod: 26,TXP,, | Performed by: INTERNAL MEDICINE

## 2024-09-27 PROCEDURE — 72197 MRI PELVIS W/O & W/DYE: CPT | Mod: 26,TXP,, | Performed by: INTERNAL MEDICINE

## 2024-09-27 PROCEDURE — 72197 MRI PELVIS W/O & W/DYE: CPT | Mod: TC,TXP

## 2024-09-27 PROCEDURE — 77080 DXA BONE DENSITY AXIAL: CPT | Mod: TC,TXP

## 2024-09-27 PROCEDURE — 77080 DXA BONE DENSITY AXIAL: CPT | Mod: 26,,, | Performed by: INTERNAL MEDICINE

## 2024-09-27 PROCEDURE — 74183 MRI ABD W/O CNTR FLWD CNTR: CPT | Mod: TC,TXP

## 2024-09-27 PROCEDURE — A9585 GADOBUTROL INJECTION: HCPCS | Mod: TXP | Performed by: INTERNAL MEDICINE

## 2024-09-27 PROCEDURE — 25500020 PHARM REV CODE 255: Mod: TXP | Performed by: INTERNAL MEDICINE

## 2024-09-27 RX ORDER — GADOBUTROL 604.72 MG/ML
10 INJECTION INTRAVENOUS
Status: COMPLETED | OUTPATIENT
Start: 2024-09-27 | End: 2024-09-27

## 2024-09-27 RX ORDER — NORTRIPTYLINE HYDROCHLORIDE 25 MG/1
25 CAPSULE ORAL NIGHTLY
Qty: 90 CAPSULE | Refills: 3 | Status: SHIPPED | OUTPATIENT
Start: 2024-09-27 | End: 2025-09-27

## 2024-09-27 RX ADMIN — GADOBUTROL 10 ML: 604.72 INJECTION INTRAVENOUS at 03:09

## 2024-09-30 ENCOUNTER — TELEPHONE (OUTPATIENT)
Dept: TRANSPLANT | Facility: HOSPITAL | Age: 49
End: 2024-09-30
Payer: MEDICARE

## 2024-09-30 NOTE — TELEPHONE ENCOUNTER
"SW called pt regarding if he has started an IOP, or done any AA, or similar. Pt stated he has not, he is still attending the weekly group therapy session at his [facility]. SW reminded pt that we had discussed the fact that he needed to do something focused on alcohol and sobriety. Pt verbalized understanding. Pt stated "I don't have time to be somewhere 5-8hrs/day. SW encouraged pt to call the IOPs he had been given to see what they do offer, and that it is not 5-8hrs/day.    Pt has not attended AA, or similar. TRUDY re-educated pt that there are meetings at all times of the day, and some can be done virtually. Pt verbalized understanding and stated he will do this, and provide documentation. SW provided support, education and resources. SW remains available.  "

## 2024-10-01 ENCOUNTER — TELEPHONE (OUTPATIENT)
Dept: PAIN MEDICINE | Facility: CLINIC | Age: 49
End: 2024-10-01
Payer: MEDICARE

## 2024-10-01 ENCOUNTER — TELEPHONE (OUTPATIENT)
Dept: TRANSPLANT | Facility: CLINIC | Age: 49
End: 2024-10-01
Payer: MEDICARE

## 2024-10-04 ENCOUNTER — TELEPHONE (OUTPATIENT)
Dept: TRANSPLANT | Facility: CLINIC | Age: 49
End: 2024-10-04
Payer: MEDICARE

## 2024-10-04 NOTE — LETTER
October 4, 2024    Irvin Diaz  [address] [place] [address]          Dear Irvin Diaz:  MRN: [id number]    It is the duty of the [facility] to determine which patients are candidates for a liver transplant.  Unfortunately, there are many more patients who need liver transplants than there are organs available.  For this reason, our Committee has the difficult task of evaluating patients to determine which ones have the greatest chance of having a successful transplant.  We are aware of the magnitude of this responsibility, and we approach it with reverence and humility.    The Liver Selection Committee has completed your evaluation. We conducted a thorough review of your medical records, your psychosocial history and drug/alcohol screens.  Based on this review, we have determined that you have been noncompliant with alcohol use and not attending required AA and IOP treatment.  At this time, you are not a candidate for a liver transplant at [facility], and you will not be listed for liver transplant.    We are very sorry that this decision had to be made, and we want to assure you that, along with your primary care physician, [facility] doctors will continue to provide you with medical care.  We recommend that you follow up with your primary care physician.  If at any time, you want to have your care reviewed by other physicians and/or institutions, we will be happy to make your complete record available.  To obtain a copy of your medical record, please contact the Release of Information Office at [phone].    Attached is a letter from the [employer] Organ [employer]aring (UN[employer]).  It describes the services and information offered to patients by [employer] and the Organ Procurement and Transplant Network.    We wish you all the best in the future.      Sincerely,    Mitch Lewis MD, ANNA, MSc, FRCSC, FACS  Director, [facility]  Director of Transplant  Research  Professor of Surgery [facility] [employer],   [facility]      Cc: Edouard Gibson MD                 The Organ Procurement and Transplantation Network   Toll-free patient services line: [phone]  Your resource for organ transplant information      Staffed 8:30 am - 5:00 pm ET Monday - Friday   Leave a message 24/7 to receive a call back    The Organ Procurement and Transplantation Network (OPTN) is the national transplant system. It makes the policies that decide how donated organs are matched to patients waiting for a transplant. The OPTN:    Makes sure donated organs get matched to people on the transplant waiting list  Tells people about the donation and transplant processes  Makes sure that the public knows about the need for more organ and tissue donations    The OPTN has a free patient services line that you can call to:  Get more information about:   o Organ donation and organ transplants   o Donation and transplant policies  Get an information kit with:   o A list of transplant hospitals   o Waiting list information  Talk about any questions you may have about your transplant hospital or organ procurement organization. The staff will do their best to help you or point you to others who may help.  Find out how you can volunteer with the OPTN and help shape transplant policy    The patient services line number is: 9-155-035-0397    Patient services line staff CANNOT answer questions about your own medical care, including:  Waiting list status  Test results  Medical records  You will need to call your transplant hospital for this information.    The following websites have more information about transplantation and donation:  OPTN: https://optn.transplant.hrsa.gov/  For potential living donors and transplant recipients:   o Living with transplant:  https://www.transplantliving.org/   o Living donation process: https://optn.transplant.hrsa.gov/living-donation/     o Financial assistance: https://www.livingdonorassistance.org/  Transplantation data: https://www.srtr.org/  Organ donation: https://www.organdonor.gov/    Volunteer with the OPTN: https://optn.transplant.hrsa.gov/get-involved

## 2024-10-04 NOTE — DISCHARGE INSTRUCTIONS
[facility] [place]  [facility] [place] [place]  [facility] service # [phone]    Hip Injection POST-PROCEDURE INSTRUCTIONS:    Today you had an injection that included a steroid medications.  The steroid may or may not have been mixed with a local anesthetic when it was injected.   Your injection today was in your hip.  This is both a therapeutic and diagnostic procedure.  What this means is that the diagnostic portion helps give us an idea if the hip is your primary pain generator, and the therapeutic portion is to hopefully give you longer lasting relief  The DIAGNOSTIC portion will only last a few hours.  This is from the local anesthetic that was placed in your hip joint.  The pain relief that you get immediately after the injection (for several hours) is the pain that the hip is most likely responsible for.  If you do not get pain relief, then your hip is likely not the cause of your pain.  The THERAPEUTIC portion of the injection is from the steroid.  This typically takes 3-5 days to start working.  The pain relief from this will hopefully last several months.  You may get side effects from the steroid.  This is not uncommon.  Symptoms include: elevated blood sugar, elevated blood pressure, headache, flushing, nausea, insomnia.  These symptoms are transient and will resolve within 1-3 days.  If symptoms last longer than this please contact our office or head to the emergency room.  You may notice that your pain worsens for a short period of time after the numbing medicine wears off, this would not be unusual due to the pressure and trauma from the needle.      What you need to do:    Keep a record of your response to the injection you had today.    How much relief did you get?   When did the relief start and how long did it last?  Were you able to decrease the use of any of your pain medications?  Were you able to increase your level of activity?  How long did the relief  last?    What to watch out for:    If you experience any of the following symptoms after your procedure, please notify the messaging service immediately (see above for contact information):   fever (increased oral temperature)   bleeding or swelling at the injection site,    drainage, rash or redness at the injection site    possible signs of infection    increased pain at the injection site   worsening of your usual pain   severe headache   new or worsening numbness    new leg weakness, or    changes in bowel and/or bladder function: urinating or defecating on yourself and not knowing that you did it.    PLEASE FOLLOW ALL INSTRUCTIONS CAREFULLY     Do not engage in strenuous activity (e.g., lifting or pushing heavy objects or repeated bending) for 24 hours.     Do not take a bath, swim or use Jacuzzi for 24 hours after procedure. (A shower is fine).   Remove any Band-Aids when you get home.    Use cold/ice, as needed for comfort.  We recommend the use of cold therapy alternating on for 20 minutes, off for 20 minutes.    Do not apply direct heat (heating pad or heat packs) to the injection site for 24 hours.     Resume your usual medications, unless instructed otherwise by your Pain Physician.      IF AT ANY POINT YOU ARE VERY CONCERNED ABOUT YOUR SYMPTOMS, PLEASE GO TO THE EMERGENCY ROOM.    If you develop worsening pain, weakness, numbness, lose bowel or bladder control (i.e., having an accident where you did not even know you had to go to the bathroom and suddenly noticed you soiled yourself), saddle anesthesia (a loss of sensation restricted to the area of the buttocks, anus and between the legs -- i.e., those parts of your body that would touch a saddle if you were sitting on one) you need to go immediately to the emergency department for evaluation and  treatment.    ----------------------------------------------------------------------------------------------------------------------------------------------------------------  If you received Sedation please read the following instructions:  POST SEDATION INSTRUCTIONS    Today you received intravenous medication (also known as sedation) that was used to help you relax and/or decrease discomfort during your procedure. This medication will be acting in your body for the next 24 hours, so you might feel a little tired or sleepy. This feeling will slowly wear off.   Common side effects associated with these medications include: drowsiness, dizziness, sleepiness, confusion, feeling excited, difficulty remembering things, lack of steadiness with walking or balance, loss of fine muscle control, slowed reflexes, difficulty focusing, and blurred vision.  Some over-the-counter and prescription medications (e.g., muscle relaxants, opioids, mood-altering medications, sedatives/hypnotics, antihistamines) can interact with the intravenous medication you received and cause an increased risk of the side effects listed above in addition to other potentially life threatening side effects. Use extreme caution if you are taking such medications, and consult with your Pain Physician or prescribing physician if you have any questions.  For the next 12-24 hours:    DO NOT--Drive a car, operate machinery or power tools   DO NOT--Drink any alcoholic beverages (not even beer), they may dangerously increase the risk of side effects.    DO NOT--Make any important legal or business decisions or sign important documents.  We advise you to have someone to assist you at home. Move slowly and carefully. Do not make sudden changes in position. Be aware of dizziness or light-headedness and move accordingly.   If you seek medical treatment within 24 hours, let the nurse or doctor caring for you know that you have received the above medications. If you  have any questions or concerns related to your sedation or treatment today please contact us.

## 2024-10-04 NOTE — TELEPHONE ENCOUNTER
Informed pt his case was discussed in the liver committee meeting. Due to pt's lack of insight and of alcohol use with liver disease, multiple failed rehab attempts and not enrolling in AA or IOP.   Explained to pt his insurance is out of network with medical services which means he will not be able to follow up in the hepatology clinic. Pt will call and discuss with insurance on Monday. Pt did express receiving help with alcoholism at his [facility] informed pt the program requires documentation of rehab treatment.

## 2024-10-07 NOTE — PRE-PROCEDURE INSTRUCTIONS
Unable to reach pt via phone.  Left voicemail with arrival time also informing pt of need for responsible  accompaniment and instructing pt to follow pre-procedure instructions provided via [facility] portal.  The following message was sent to pt's portal.        Dear Irvin,     Please read over the following pre-procedure instructions in it's entirety as there is helpful information here to get you well prepared for your upcoming procedure.     You are scheduled for a procedure with Dr. De La Garza on 10/8/2024.     [facility] at the corner of [facility] and [facility]. It is in the [place] next to [employer]. The address is: [address] [place] [address]. Take the elevator to the 2nd floor.       Registration check in time: 11:30 am  Procedure scheduled for time: 1:30 pm     If you are receiving sedation, you CANNOT drive yourself and must have a responsible friend or family member (no rideshare) to drive you home.        You should take any medications that you routinely take for blood pressure, heart medications, thyroid, cholesterol, etc.      The fasting restrictions are dependent on whether or not you are receiving sedation. Sedation is not available for all procedures.      Your fasting instructions/Sedation type are as follow:  No sedation.  You do not need to fast before this procedure.  You can eat and drink like normal.  You can drive yourself (with stipulations).  There are some rare cases where you may need to call an uber if you are unable to drive after the procedure due to weakness/dizziness.         If you are on blood thinners, you need to follow the anticoagulation instructions that had been discussed previously. You should only stop the blood thinners if it was approved by your primary care physician or your cardiologist. In the event that you are not able to stop your blood thinners, a blood thinner was not listed on your medication list, or we were not  able to get clearance from your cardiologist, then the procedure may have to be postponed/canceled.      IF you were told to stop your blood thinners, this is how long you should generally hold some of the more common ones. Remember that stopping blood thinners is only necessary for certain procedures. If you are unsure of your instructions, please call us.   Aspirin - 5 days  Plavix/Clopidogrel - 7 days  Warfarin / Coumadin - 5 days  Eliquis - 3 days  Pradaxa/Dabigatran - 4 days  Xarelto/Rivaroxaban - 3 days     If you are a diabetic, do not take your medication if you will be fasting, but bring it with you. Please plan on being here for roughly 2-3 hours.     Please call us if you have been sick (running fever, having any flu-like symptoms) or have been taking ANTIBIOTICS in the past 2 weeks or had any outpatient procedures other than with us (colonoscopy, endoscopy, OBGYN, dental, etc.).      If you have been previously COVID positive, you will need to hold off on your procedure until you are symptom free for 10 days. If you did not have any symptoms, you can have your procedure 10 days from your positive test result.         On the morning of your procedure:  *HOLD ALL VITAMINS, MINERALS, HERBS (INCLUDING HERBAL TEAS) AND SUPPLEMENTS  *SHOWER WITH ANTIBACTERIAL SOAP (EX. DIAL) NIGHT BEFORE AND MORNING OF PROCEDURE  *DO NOT APPLY ANY LOTIONS, OILS, POWDERS, PERFUME/COLOGNE, OINTMENTS, GELS, CREAMS, MAKEUP OR DEODORANT TO YOUR SKIN MORNING OF PROCEDURE  *LEAVE JEWELRY AND ANY VALUABLES AT HOME  *WEAR LOOSE COMFORTABLE CLOTHING         Please reply to this portal message as receipt of delivery.     Thank you,  [facility]  [place], LPN [facility]  Pre-Admit

## 2024-10-08 ENCOUNTER — HOSPITAL ENCOUNTER (OUTPATIENT)
Facility: HOSPITAL | Age: 49
Discharge: HOME | End: 2024-10-08
Attending: STUDENT IN AN ORGANIZED HEALTH CARE EDUCATION/TRAINING PROGRAM | Admitting: STUDENT IN AN ORGANIZED HEALTH CARE EDUCATION/TRAINING PROGRAM
Payer: MEDICARE

## 2024-10-08 VITALS
DIASTOLIC BLOOD PRESSURE: 62 MMHG | WEIGHT: 240 LBS | TEMPERATURE: 98 F | RESPIRATION RATE: 16 BRPM | BODY MASS INDEX: 32.51 KG/M2 | SYSTOLIC BLOOD PRESSURE: 123 MMHG | HEIGHT: 72 IN | OXYGEN SATURATION: 100 % | HEART RATE: 75 BPM

## 2024-10-08 DIAGNOSIS — M16.11 OSTEOARTHRITIS OF RIGHT HIP, UNSPECIFIED OSTEOARTHRITIS TYPE: Primary | ICD-10-CM

## 2024-10-08 DIAGNOSIS — G89.29 CHRONIC PAIN: ICD-10-CM

## 2024-10-08 PROCEDURE — 63600175 PHARM REV CODE 636 W HCPCS: Performed by: STUDENT IN AN ORGANIZED HEALTH CARE EDUCATION/TRAINING PROGRAM

## 2024-10-08 PROCEDURE — 20610 DRAIN/INJ JOINT/BURSA W/O US: CPT | Mod: RT | Performed by: STUDENT IN AN ORGANIZED HEALTH CARE EDUCATION/TRAINING PROGRAM

## 2024-10-08 PROCEDURE — 20610 DRAIN/INJ JOINT/BURSA W/O US: CPT | Mod: RT,,, | Performed by: STUDENT IN AN ORGANIZED HEALTH CARE EDUCATION/TRAINING PROGRAM

## 2024-10-08 PROCEDURE — 77002 NEEDLE LOCALIZATION BY XRAY: CPT | Mod: 26,,, | Performed by: STUDENT IN AN ORGANIZED HEALTH CARE EDUCATION/TRAINING PROGRAM

## 2024-10-08 PROCEDURE — 25500020 PHARM REV CODE 255: Performed by: STUDENT IN AN ORGANIZED HEALTH CARE EDUCATION/TRAINING PROGRAM

## 2024-10-08 RX ORDER — BUPIVACAINE HYDROCHLORIDE 2.5 MG/ML
INJECTION, SOLUTION EPIDURAL; INFILTRATION; INTRACAUDAL
Status: DISCONTINUED | OUTPATIENT
Start: 2024-10-08 | End: 2024-10-08 | Stop reason: HOSPADM

## 2024-10-08 RX ORDER — TRIAMCINOLONE ACETONIDE 40 MG/ML
INJECTION, SUSPENSION INTRA-ARTICULAR; INTRAMUSCULAR
Status: DISCONTINUED | OUTPATIENT
Start: 2024-10-08 | End: 2024-10-08 | Stop reason: HOSPADM

## 2024-10-08 RX ORDER — LIDOCAINE HYDROCHLORIDE 20 MG/ML
INJECTION, SOLUTION EPIDURAL; INFILTRATION; INTRACAUDAL; PERINEURAL
Status: DISCONTINUED | OUTPATIENT
Start: 2024-10-08 | End: 2024-10-08 | Stop reason: HOSPADM

## 2024-10-08 NOTE — DISCHARGE SUMMARY
[facility]  Discharge Note  Short Stay    Procedure(s) (LRB):  Right hip injection (Right)      OUTCOME: Patient tolerated treatment/procedure well without complication and is now ready for discharge.    DISPOSITION: Home or Self Care    FINAL DIAGNOSIS:  <principal problem not specified>    FOLLOWUP: In clinic    DISCHARGE INSTRUCTIONS:  No discharge procedures on file.     TIME SPENT ON DISCHARGE: 10 minutes

## 2024-10-08 NOTE — OP NOTE
Hip Joint Injection under Fluoroscopic Guidance    The procedure, risks, benefits, and options were discussed with the patient. There are no contraindications to the procedure. The patent expressed understanding and agreed to the procedure. Informed written consent was obtained prior to the start of the procedure and can be found in the patient's chart.    PATIENT NAME: Irvin Diaz Jr.   MRN: [id number]     DATE OF PROCEDURE: 10/08/2024    PROCEDURE: Right Hip Joint Injection under Fluoroscopic Guidance    PRE-OP DIAGNOSIS: Chronic left hip pain [M25.552, G89.29]    POST-OP DIAGNOSIS: Chronic left hip pain [M25.552, G89.29]    PHYSICIAN: Robbin De La Garza DO    ASSISTANTS: dr snyder     MEDICATIONS INJECTED: Preservative-free Kenalog 40mg with 3cc of Bupivacine 0.25%     LOCAL ANESTHETIC INJECTED: Xylocaine 2%     SEDATION: None    ESTIMATED BLOOD LOSS: None    COMPLICATIONS: None    Drain: 8cc of serosanguinous fluid    TECHNIQUE: Time-out was performed to identify the patient and procedure to be performed. With the patient laying in a supine position, the surgical area was prepped and draped in the usual sterile fashion using ChloraPrep and a fenestrated drape. The area overlying the hip joint was determined under fluoroscopy guidance. Skin anesthesia was achieved by injecting Lidocaine 2% over the injection site. A 22 gauge, 3.5 inch spinal quinke needle was introduced under fluoroscopy until the tip reached the greater trochanter. The tip of the needle was hinged cephalad from the greater trochanter into the joint space.  When the needle tip was in the appropriate position, and there was no blood aspiration, Contrast dye  Omnipaque (300mg/mL) was injected to confirm placement and there was no vascular runoff. 4 mL of the medication mixture listed above was injected slowly. The needles were removed and bleeding was nil. A sterile dressing was applied. No specimens collected. The patient tolerated the  procedure well.      The patient was monitored after the procedure in the recovery area. They were given post-procedure and discharge instructions to follow at home. The patient was discharged in a stable condition.      Robbin Abernathy DO

## 2024-10-08 NOTE — PLAN OF CARE
Irvin Joe has met all discharge criteria from Phase II. Vital Signs are stable, ambulating  without difficulty. Discharge instructions given, patient verbalized understanding. Discharged from facility via ambulation in stable condition.

## 2024-10-08 NOTE — PLAN OF CARE
Pt in preop [facility], VSS, Pt denies any open wounds on body or the use of any immunizations or antibiotics in the past 2 weeks. Pt needs site marking and consents, otherwise ready to roll.

## 2024-10-10 ENCOUNTER — CLINICAL SUPPORT (OUTPATIENT)
Dept: PAIN MEDICINE | Facility: CLINIC | Age: 49
End: 2024-10-10
Payer: MEDICARE

## 2024-10-10 VITALS
HEART RATE: 71 BPM | SYSTOLIC BLOOD PRESSURE: 150 MMHG | DIASTOLIC BLOOD PRESSURE: 73 MMHG | WEIGHT: 240.06 LBS | BODY MASS INDEX: 32.52 KG/M2 | HEIGHT: 72 IN

## 2024-10-10 DIAGNOSIS — M25.552 CHRONIC LEFT HIP PAIN: ICD-10-CM

## 2024-10-10 DIAGNOSIS — M87.052 AVASCULAR NECROSIS OF HIP, LEFT: ICD-10-CM

## 2024-10-10 DIAGNOSIS — M17.12 PRIMARY OSTEOARTHRITIS OF LEFT KNEE: Primary | ICD-10-CM

## 2024-10-10 DIAGNOSIS — G89.29 CHRONIC LEFT HIP PAIN: ICD-10-CM

## 2024-10-10 DIAGNOSIS — M16.11 OSTEOARTHRITIS OF RIGHT HIP, UNSPECIFIED OSTEOARTHRITIS TYPE: ICD-10-CM

## 2024-10-10 PROCEDURE — 99999 PR PBB SHADOW E&M-EST. PATIENT-LVL II: CPT | Mod: PBBFAC,,, | Performed by: STUDENT IN AN ORGANIZED HEALTH CARE EDUCATION/TRAINING PROGRAM

## 2024-10-10 NOTE — PROGRESS NOTES
Chronic Pain - f/u    Referring Physician: Robbin De La Garza,*    Date: 10/10/2024     Re: Irvin Diaz Jr.  MR#: [id number]  YOB: 1975  Age: 49 y.o.    Chief Complaint: hip pain  No chief complaint on file.    **This note is dictated using the M*Modal Fluency Direct word recognition program. There are word recognition mistakes that are occasionally missed on review.**    ASSESSMENT: 49 y.o. year old male with left hip pain, consistent with     1. Primary osteoarthritis of left knee  hylan g-f 20 (SYNVISC ONE) 48 mg/6 mL injection 48 mg      2. Chronic left hip pain        3. Avascular necrosis of hip, left        4. Osteoarthritis of right hip, unspecified osteoarthritis type          PLAN:     Left knee pain likely OA 2/2 altered mechanics due to hip AVN  -pain with varus/valgus strain  -left knee x-ray shows KL grade 2 OA with medial joint space narrowing  -minimal edema. Pain worse with weight bearing.  -6/20/24 - knee injection CSI - 70%x1m  -He has failed PT in the knee before.  -He uses a knee brace.  -failed PT. Failed conservative therapy.  Cannot take NSAIDs due to low platelets  -MEDICAL NECESSITY FOR VISCOSUPPLEMENTATION USE: After thorough evaluation of the patient, I have determined that viscosupplementation treatment is medically necessary. The patient has painful degenerative joint disease (DJD) of the knee(s) with failure of conservative treatments including lifestyle modifications and rehabilitation exercises. Oral analgesics including NSAIDs have not adequately controlled the patient's symptoms or are contraindicated. There is radiographic evidence of Kellgren-Maury grade II (or greater) osteoarthritic (OA) changes, or if lack of radiographic evidence, there is arthroscopic or other evidence of chondrosis of the knee(s).   10/10/24 - L knee synvisc-one inj. Risks, benefits, and alternatives were discussed with the patient, and He would like to proceed.    Avascular  necrosis of the left hip   -s/p hip block without significant pain relief and complicated by Hematoma.   -tolerating oxycodone 5 mg BID PRN (he thinks his lucid dreams were due to EtOH withdrawal). No more lucid dreams. Switch back to tramadol  -switched back to tramadol because he has been doing better and does not think that he needs the oxycodone anymore.  -refill Tramadol QID. This has been working for him. Helps him function. Refill x3 months  - Medrol dose pack, helped while taking, but not sustained.  -not surgical candidate  -Not a candidate for further procedures due to bleeding risk  - Plts improved to ~110.  -buprenorphine likely not an option 2/2 to his ESLD and liver transplant status    Right hip pain  -XR Right hip given hx of avascular necrosis in the left hip. This showed moderate OA in the hip  9/28/23 - right hip injection - >50% relief for 6 months  10/8/24 - Right hip injection - no sed - no AC - >80% improvement at 2 days  -recommended minimizing repeats due to risks unless the pain becomes unbearable.    Allodynia  -stopped Nortriptyline to 50 mg nightly. Helps him sleep, but made him nauseous    Thorombocytopenia 2/2 cirrhosis  -platelets are 92 on 5/26/22, 82 on 9/7/22, 74 on 10/25/22, 110 on 5/18/23  -Avanos recommended above 50, but he had hematoma, so will not proceed.    Chronic Opioid Use  - UDS from October and September did not show opioids but he states he was decreasing use at that time.  -no UDS today, He had a drug screen 05/2023 which was appropriate.  -new UDS 6/20/24 since the one in March 2024 did not show tramadol.  UDS 6/2024 showed MJ.  UDS in 09/2024 was appropriate.    Cirrhosis 2/2 past EtOH. He states he is not drinking anymore.  -following with hepatology  -possible liver transplant?  -working with psychiatry for EtOH.    Kidney disease  -GFR 54    - RTC on 1/5/24  - Counseled patient regarding the importance of  activity modification.    The above plan and management  options were discussed at length with patient. Patient is in agreement with the above and verbalized understanding. It will be communicated with the referring physician via electronic record, fax, or mail.  Lab/study reports reviewed were important and necessary because subsequent medical and treatment recommendations required review of the above lab/study reports. Images viewed/reviewed above were important and necessary because subsequent medical and treatment recommendations required review of the reviewed image(s).     Electronically signed by:  Robbin De La Garza DO  10/10/2024    =========================================================================================================    SUBJECTIVE:    Interval History 10/10/2024:   Irvin WOOD Joe Gudino is a 49 y.o. male presents to the clinic for follow up.  Since last visit the pain has has significantly improved. Right hip is almost 100% better since the injection a few days ago.  We drained fluid form the hip as well and that likely helped.  He is able to walk without a cane right now.  He is ready for his left knee injection.  No other complaints.    Current pain medications: Tramadol 50mg QID  Failed Pain Medications: oxycodone, tramadol, gabapentin, cannot take NSAIDs due to gastric bleeding, cannot take tylenol due to liver failure.     Pain procedures:  6/24/22 - left hip block - no relief. C/b hematoma formation  10/10/23 - Right hip injection - oral sed - no AC - >50% @ 6m  6/20/24 - L Knee - 70%x1m  10/8/24 - Right hip injection - no sed - no AC  10/10/24 - L knee synvisc-1 inj.    Interval History 9/26/2024:   Irvin WOOD Joe Gudino is a 49 y.o. male presents to the clinic for follow up.  Since last visit the pain has is unchanged.    The pain is located in the left hip area and radiates to the left knee and ankles .  The pain is described as aching, shooting, and stabbing    At BEST  8/10   At WORST  10/10 on the WORST day.    On average  pain is rated as 8/10.   Today the pain is rated as 8/10  Symptoms interfere with daily activity and sleeping.   Exacerbating factors: Sitting, Standing, Bending, and Flexing.    Mitigating factors nothing.     Interval History 6/20/2024:    is a 48 y.o. male presents to the clinic for follow up.  Since last visit the pain has is unchanged.    The pain is located in the left Hip area and radiates to the left knee and ankles.  The pain is described as aching, shooting, and stabbing    At BEST  8/10   At WORST  10/10 on the WORST day.    On average pain is rated as 8/10.   Today the pain is rated as 8/10  Symptoms interfere with daily activity and sleeping.   Exacerbating factors: Sitting, Standing, Bending, and Flexing.    Mitigating factors nothing.     Interval History 3/18/2024:   Irvin Diaz Jr. is a 49 y.o. male presents to the clinic for follow up.  Since last visit the pain has has worsened. He has been having to travel to [place] a lot recently due to his son-in-law getting in to a MVA and helping out. His right hip has been bothering him more recently    The pain is located in the groin area and radiates to the right hip, left knee and ankles .  The pain is described as aching    At BEST  8/10   At WORST  10/10 on the WORST day.    On average pain is rated as 8/10.   Today the pain is rated as 8/10  Symptoms interfere with daily activity and sleeping.   Exacerbating factors: Sitting, Standing, Bending, and Flexing.    Mitigating factors medications.     Interval History 9/13/2023:   Irvin Estradaruder . is a 49 y.o. male presents to the clinic for follow up.  Since last visit the pain has has worsened.    The pain is located in the groin, left knee, and both ankles area and radiates to the knee and ankle .  The pain is described as aching    At BEST  8/10   At WORST  10/10 on the WORST day.    On average pain is rated as 8/10.   Today the pain is rated as 8/10  Symptoms interfere with daily activity  and sleeping.   Exacerbating factors: Sitting, Standing, Bending, Walking, and Flexing.    Mitigating factors medications.     Interval History 6/21/2023:   Irvin Diaz Jr. is a 49 y.o. male presents to the clinic for follow up.  Since last visit the pain has has improved in some ways and worsened in others. The right hip has been bothering him more now. States that his right leg is clicking.     The pain is located in the left hip area and radiates to the right hip and down to his left knee .  The pain is described as aching and throbbing    At BEST  7/10   At WORST  9/10 on the WORST day.    On average pain is rated as 8/10.   Today the pain is rated as 8/10  Symptoms interfere with daily activity.   Exacerbating factors: Bending.    Mitigating factors medications (tramadol not oxycodone).     Interval History 1/25/2023:     Irvin Diaz Jr. is a 49 y.o. male presents to the clinic for follow up.  Since last visit the pain has has moderately improved. Reports that he went on a cruise and noticed his pain was doing better.  He has stopped using the cane because it has been better.  He went back to tramadol because he did not think he required the oxycodone. Pain 8/10 today. Feels like a tolerable 8.    Interval History 10/31/2022:     Irvin Diaz Jr. is a 49 y.o. male presents to the clinic for follow up.  Since last visit the pain has has significantly worsened.    The pain is located in the L hip area and radiates to the L foot .  The pain is described as aching, dull, shooting, stabbing, tight band, and tingling    At BEST  8/10   At WORST  10/10 on the WORST day.    On average pain is rated as 7/10.   Today the pain is rated as 8/10  Symptoms interfere with daily activity, sleeping, and work.   Exacerbating factors: Standing, Bending, Coughing/Sneezing, Walking, and Getting out of bed/chair.    Mitigating factors nothing and medications.     Interval History 9/7/2022:     Irvin Estradaruder   is a 49 y.o. male presents to the clinic for follow up.  Since last visit the pain has has worsened. He is now using a walker full time for the last 2 weeks. In a wheelchair today. He is unable to describe if his limited mobility is due to just pain or maybe a component of weakness.     The pain is located in the L hip area and radiates to the L foot .  The pain is described as aching, shooting, and stabbing    At BEST  7/10   At WORST  10/10 on the WORST day.    On average pain is rated as 8/10.   Today the pain is rated as 10/10  Symptoms interfere with daily activity, sleeping, and work.   Exacerbating factors: Standing, Laying, Bending, Walking, Lifting, and Getting out of bed/chair.    Mitigating factors none.     Interval History 8/2/2022:     Irvin Diaz Jr. is a 49 y.o. male presents to the clinic for follow up.  Since last visit the pain has is unchanged.  He stopped the Oxycodone because it gave severe, lucid dreams.  Ran out of tramadol and has not been taking anything for his pain.    The pain is located in the left hip area and radiates to the left knee/ groin .  The pain is described as aching, shooting and stabbing    At BEST  8/10   At WORST  10/10 on the WORST day.    On average pain is rated as 8/10.   Today the pain is rated as 8/10  Symptoms interfere with daily activity, sleeping and work.   Exacerbating factors: Standing, Walking and Getting out of bed/chair.    Mitigating factors nothing, heat, ice, medications and rest.     Interval History 7/5/2022:     Irvin Diaz Jr. is a 49 y.o. male presents to the clinic for follow up.  Since last visit the pain has is unchanged.  He is s/p hip block that was complicated by hematoma in the pectineus muscle.  His Hgb has dropped, although the hematoma is resolving.  Recommended that patient go to ED as recommended by transplant.    The pain is located in the left hip area and radiates to the left leg/ groin  .  The pain is described as  aching, shooting and stabbing    At BEST  8/10   At WORST  10/10 on the WORST day.    On average pain is rated as 8/10.   Today the pain is rated as 8/10  Symptoms interfere with daily activity, sleeping and work.   Exacerbating factors: Standing, Walking and Getting out of bed/chair.    Mitigating factors nothing, heat, ice, medications and rest.     Interval History 5/30/2022:     Irvin Diaz Jr. is a 49 y.o. male presents to the clinic for follow up.  Since last visit the pain has is unchanged. He missed his original procedure date due to not checking his voicemail and transportation issues. He is still interested in the procedure.  WE had a long talk about bleeding risk with his low platelets and high INR.    The pain is located in the left hip area and radiates to the left leg/groin .  The pain is described as aching, shooting and stabbing    At BEST  8/10   At WORST  10/10 on the WORST day.    On average pain is rated as 8/10.   Today the pain is rated as 8/10  Symptoms interfere with daily activity, sleeping and work.   Exacerbating factors: walking.    Mitigating factors nothing.     Initial Hx:  Irvin Diaz Jr. is a 49 y.o. male presents to the clinic for the evaluation of left hip pain. The pain started 3 years ago following fall and symptoms have been worsening. The patient states used to get hip injections of the left hip.  He is unable to walk without a cane.  He states that he was told after imaging that he had a fracture in 2019.  He had an injection (and aspiration) in December/january and it helps the pain for about 3 weeks.  After the injections he still needs the pain but it helps.  The ortho physician at [facility]    CT note about the hip:  There is collapse of the left femoral head superior portion with bone-on-bone as well as underlying femoral sclerotic changes suggesting AVN with severe secondary degenerative changes of the acetabulum and the left hip effusion  stable since prior exam.     The patient says that he has seen a ortho surgeon in the past and that they are unable to do surgery due to his liver failure.    Pain Description:    The pain is located in the left hip area and radiates to the left leg/ groin area .    At BEST  8/10   At WORST  10/10 on the WORST day.    On average pain is rated as 8/10.   Today the pain is rated as 8/10  The pain is continuous.  The pain is described as aching, shooting and throbbing    Symptoms interfere with daily activity, sleeping and work.   Exacerbating factors: Standing, Laying, Bending, Walking, Night Time, Morning, Flexing, Lifting and Getting out of bed/chair.    Mitigating factors laying down and medications.   He reports 5 hours of sleep per night.    Physical Therapy/Home Exercise: No, not currently in physical therapy or home exercise program    Current Pain Medications:    - none    Failed Pain Medications:    - cannot take NSAIDs due to gastric bleeding, cannot take tylenol due to liver failure.     Pain Treatment Therapies:    Pain procedures: hip injections  Physical Therapy: physical therapy made things worse  Chiropractor: none  Acupuncture: none  TENS unit: none  Spinal decompression: none  Joint replacement: none    Patient denies urinary incontinence, bowel incontinence and loss of sensations. (+) weakness in the left leg  Patient denies any suicidal or homicidal ideations     report:  Reviewed and consistent with medication use as prescribed.    Imaging:   XR abdomen 03/2022:  Please note the hemidiaphragms are not included in their entirety.  Multiple surgical clips and presumed anastomotic suture line project over the right abdomen.  There are a few mildly prominent loops of gas-filled small bowel loops present measuring up to 3.3 cm in the left abdomen.  Limited evaluation of free intraperitoneal air to patient positioning/technique.  Calcifications project over the pelvis, likely phleboliths.  Osseous  structures demonstrate significant degenerative change of the left hip with chronic appearing deformity/collapse of the left femoral head.    CT abdomen 03/2022:  Mild circumferential wall thickening involving the proximal colon extending from the ileocolic anastomosis at the hepatic flexure through around the level of the splenic flexure suggests inflammatory or infectious colitis.  No convincing transmural inflammation is seen.     Postoperative changes of proximal colectomy and scattered mild diverticulosis of the more distal colon.  No convincing diverticulitis, bowel obstruction, free air or abscess.     Findings of cirrhosis and mild abdominal ascites as described essentially stable.     Left hip findings suggesting AVN as described.     This report was flagged in Epic as abnormal.     No other significant or acute findings.       Past Medical History:   Diagnosis Date    Alcohol withdrawal 5/1/2022    Alcoholic cirrhosis of liver     Alcoholic ketoacidosis 6/6/2021    Arthritis     ATN (acute tubular necrosis)     CHF (congestive heart failure)     Diverticulitis 01/2020    Esophageal varices     GERD (gastroesophageal reflux disease)     GI bleed     Hip arthritis     Left    Hypertension     Liver cirrhosis     Macrocytic anemia     Pulmonary embolism 08/2018    Unprovoked DVT.  Stop Coumadin due to GIB    Thrombocytopenia      Past Surgical History:   Procedure Laterality Date    ANKLE SURGERY      BLOCK, NERVE, PERIPHERAL Left 06/24/2022    Procedure: Left Hip femoral-obturator accessory nerve block;  Surgeon: Robbin De La Garza DO;  Location: [facility] OR;  Service: Pain Management;  Laterality: Left;    COLON SURGERY  2007    COLONOSCOPY  09/05/2019    [facility]    COLONOSCOPY N/A 02/17/2021    Procedure: COLONOSCOPY;  Surgeon: Renea Billingsley MD;  Location: [facility];  Service: Endoscopy;  Laterality: N/A;    COLONOSCOPY N/A 2/13/2023    Procedure: COLONOSCOPY;  Surgeon: Rudy Orellana MD;  Location: [facility]  (4TH FLR);  Service: Endoscopy;  Laterality: N/A;  cirrhosis-labs done on 1/11/23  instr portal-GT  No answer for precall- [place]    COLONOSCOPY N/A 3/10/2023    Procedure: COLONOSCOPY;  Surgeon: Rudy Orellana MD;  Location: [facility] (4TH FLR);  Service: Endoscopy;  Laterality: N/A;  inst via poral per pt request    COLONOSCOPY W/ BIOPSIES  02/17/2021    ESOPHAGOGASTRODUODENOSCOPY N/A 07/26/2019    Procedure: EGD (ESOPHAGOGASTRODUODENOSCOPY);  Surgeon: Brian Trivedi MD;  Location: [facility];  Service: Endoscopy;  Laterality: N/A;    ESOPHAGOGASTRODUODENOSCOPY N/A 04/08/2020    Procedure: EGD (ESOPHAGOGASTRODUODENOSCOPY);  Surgeon: Donn Acuna MD;  Location: [facility];  Service: Endoscopy;  Laterality: N/A;    ESOPHAGOGASTRODUODENOSCOPY N/A 01/03/2021    Procedure: EGD (ESOPHAGOGASTRODUODENOSCOPY)- coffee ground emesis, hx varices;  Surgeon: Steve Chairez MD;  Location: [facility];  Service: Endoscopy;  Laterality: N/A;    ESOPHAGOGASTRODUODENOSCOPY N/A 05/03/2021    Procedure: EGD (ESOPHAGOGASTRODUODENOSCOPY);  Surgeon: Jeanmarie Ngo MD;  Location: [facility];  Service: Endoscopy;  Laterality: N/A;    ESOPHAGOGASTRODUODENOSCOPY N/A 07/19/2021    Procedure: ESOPHAGOGASTRODUODENOSCOPY (EGD);  Surgeon: Claudio Medeiros MD;  Location: [facility];  Service: Endoscopy;  Laterality: N/A;    ESOPHAGOGASTRODUODENOSCOPY  12/20/2021    ESOPHAGOGASTRODUODENOSCOPY N/A 12/20/2021    Procedure: EGD (ESOPHAGOGASTRODUODENOSCOPY);  Surgeon: Ajay Jackson MD;  Location: [facility];  Service: Endoscopy;  Laterality: N/A;    ESOPHAGOGASTRODUODENOSCOPY N/A 05/03/2022    Procedure: EGD (ESOPHAGOGASTRODUODENOSCOPY);  Surgeon: Brian Trivedi MD;  Location: [facility];  Service: Endoscopy;  Laterality: N/A;    ESOPHAGOGASTRODUODENOSCOPY N/A 7/7/2022    Procedure: EGD (ESOPHAGOGASTRODUODENOSCOPY);  Surgeon: Ajay Jackson MD;  Location: [facility];  Service: Endoscopy;  Laterality: N/A;    ESOPHAGOGASTRODUODENOSCOPY N/A  2022    Procedure: EGD (ESOPHAGOGASTRODUODENOSCOPY);  Surgeon: Edouard Maynard MD;  Location: [facility] ([facility] FLR);  Service: Endoscopy;  Laterality: N/A;    ESOPHAGOGASTRODUODENOSCOPY N/A 2023    Procedure: EGD (ESOPHAGOGASTRODUODENOSCOPY);  Surgeon: Caesar Dickens MD;  Location: [facility] END[facility];  Service: Endoscopy;  Laterality: N/A;    ESOPHAGOGASTRODUODENOSCOPY N/A 3/28/2024    Procedure: EGD (ESOPHAGOGASTRODUODENOSCOPY);  Surgeon: Jeanmarie Ngo MD;  Location: [facility] ENDO;  Service: Endoscopy;  Laterality: N/A;    HERNIA REPAIR Left     Inguinal    INJECTION OF JOINT Right 2023    Procedure: RT Hip Injection;  Surgeon: Robbin De La Garza DO;  Location: [facility];  Service: Pain Management;  Laterality: Right;  oral - 20 mins    INJECTION OF JOINT Right 10/8/2024    Procedure: Right hip injection;  Surgeon: Robbin De La Garza DO;  Location: [facility];  Service: Pain Management;  Laterality: Right;  No sed no AC    UPPER GASTROINTESTINAL ENDOSCOPY N/A 2022     Social History     Socioeconomic History    Marital status:    Tobacco Use    Smoking status: Former     Current packs/day: 0.00     Types: Cigarettes     Quit date: 2000     Years since quittin.7    Smokeless tobacco: Never    Tobacco comments:     quit 20 years ago   Substance and Sexual Activity    Alcohol use: Not Currently     Comment: Quit drinking 21    Drug use: Not Currently    Sexual activity: Yes     Comment: occ     Social Drivers of Health     Financial Resource Strain: Low Risk  (2024)    Overall Financial Resource Strain (CARDIA)     Difficulty of Paying Living Expenses: Not hard at all   Food Insecurity: No Food Insecurity (2024)    Hunger Vital Sign     Worried About Running Out of Food in the Last Year: Never true     Ran Out of Food in the Last Year: Never true   Transportation Needs: No Transportation Needs (2024)    PRAPARE - Transportation     Lack of  Transportation (Medical): No     Lack of Transportation (Non-Medical): No   Physical Activity: Inactive (4/22/2024)    Exercise Vital Sign     Days of Exercise per Week: 0 days     Minutes of Exercise per Session: 0 min   Stress: No Stress Concern Present (4/22/2024)    [employer] - Occupational Stress Questionnaire     Feeling of Stress : Not at all   Housing Stability: Unknown (4/22/2024)    Housing Stability Vital Sign     Unable to Pay for Housing in the Last Year: No     Family History   Problem Relation Name Age of Onset    Hypertension Mother      Breast cancer Mother      Hypertension Father      Prostate cancer Father      Bladder Cancer Father         Review of patient's allergies indicates:   Allergen Reactions    Ciprofloxacin hcl Hallucinations    Meperidine Hives     Pt medicated w/multiple medications (demerol, protonix, and zofran)  w/i 10 mins time frame prior to developing localized hives near IV site that med was administered. Pt reports he has/takes all other medications on daily basis.     Morphine Itching    Nsaids (non-steroidal anti-inflammatory drug)      Kidney Disease    Tylenol [acetaminophen]      Hx of liver disease       Current Outpatient Medications   Medication Sig    ferrous gluconate (FERGON) 240 (27 FE) MG tablet Take 2 tablets (480 mg total) by mouth 2 (two) times daily with meals.    folic acid (FOLVITE) 1 MG tablet Take 1 tablet (1 mg total) by mouth once daily.    metoprolol tartrate (LOPRESSOR) 50 MG tablet Take 1 tablet (50 mg total) by mouth 2 (two) times daily. Do not take if heart rate is less than 60    nortriptyline (PAMELOR) 25 MG capsule Take 1 capsule (25 mg total) by mouth every evening.    rifAXIMin (XIFAXAN) 550 mg Tab Take 1 tablet (550 mg total) by mouth 2 (two) times daily.    sildenafiL (VIAGRA) 50 MG tablet Take 50 mg by mouth daily as needed for Erectile Dysfunction.    spironolactone (ALDACTONE) 25 MG tablet Take 1 tablet (25  mg total) by mouth once daily.    tadalafiL (CIALIS) 20 MG Tab Take 1 tablet (20 mg total) by mouth daily as needed (erectile dysfunction).    thiamine 100 MG tablet Take 1 tablet (100 mg total) by mouth once daily.    traMADoL (ULTRAM) 50 mg tablet Take 1 tablet (50 mg total) by mouth 4 (four) times daily as needed for Pain.     Current Facility-Administered Medications   Medication    hylan g-f 20 (SYNVISC ONE) 48 mg/6 mL injection 48 mg       REVIEW OF SYSTEMS:    GENERAL:  No weight loss, malaise or fevers.   HEENT:   No recent changes in vision or hearing   NECK:  Negative for lumps, no difficulty with swallowing.  RESPIRATORY:  Negative for cough, wheezing or shortness of breath, patient denies any recent URI.  CARDIOVASCULAR:  Negative for chest pain, leg swelling or palpitations.  GI:  Negative for abdominal discomfort, blood in stools or black stools or change in bowel habits.  MUSCULOSKELETAL:  See HPI.  SKIN:  Negative for lesions, rash, and itching. + skin itching  PSYCH:  No mood disorder or recent psychosocial stressors.  Patients sleep is not disturbed secondary to pain.  HEMATOLOGY/LYMPHOLOGY:  Negative for prolonged bleeding, bruising easily or swollen nodes.  Patient is not currently taking any anti-coagulants  NEURO:   No history of headaches, syncope, paralysis, seizures or tremors.  All other reviewed and negative other than HPI.    OBJECTIVE:    BP (!) 150/73   Pulse 71   Ht 6' (1.829 m)   Wt 108.9 kg (240 lb 1.3 oz)   BMI 32.56 kg/m²     PHYSICAL EXAMINATION:    GENERAL: Fraile, in no acute distress, alert and oriented x3.  PSYCH:  Mood and affect appropriate.  SKIN: Skin color, texture, turgor normal, no rashes or lesions on visible skin.  HEAD/FACE:  Normocephalic, atraumatic. Cranial nerves grossly intact.  CV: RRR with palpation of the radial artery.  PULM: CTAB. No evidence of respiratory difficulty, symmetric chest rise.  GI:  Soft    MUSKULOSKELETAL:    EXTREMITIES:   Left Hip Exam  (limited ROM and exam 2/2/ pain)  - Log Roll Did not perform  - FADIR Did not perform  - Stinchfield Did not perform  - Hip Scour Did not perform  - GTB Tenderness Positive   -TTP over anterior hip joint. Posterior not palpated  - TTP of the superior knee joint.    Right hip exam:  (-) log roll  (-) fadir  (+) TTP of the right groin    Left knee:  TTP right medial knee  (-) edema  (-) crepitus  (+) pain with end ROM    MUSCULOSKELETAL:  Atrophy of the left leg compared to the right  No deformities, edema, or skin discoloration are noted on visible skin. Good capillary refill.     NEURO: Bilateral upper and lower extremity coordination and muscle stretch reflexes are physiologic and symmetric.      NEUROLOGICAL EXAM:  MENTAL STATUS: A x O x 3, good concentration, speech is fluent and goal directed  MEMORY: recent and remote are intact  CN: CN2-12 grossly intact  MOTOR: 5/5 in all muscle groups on the right. HF 3/5 on the left, 4/5 KE, 4/5 KF with pain  DTRs: 3+ intact symmetric patella and 2 + achilles  Sensation:    -yes Loss of sensation in a left lower L-1 and L-2 on the left distribution.  Babinski: absent     Gait: Cane, abnormal. Short stride. Favors left leg

## 2024-10-10 NOTE — PROCEDURES
Procedures  PROCEDURE: left Knee Synvisc (Hyaluronic) ONE Injection with Ultrasound Guidance    Patient Name: Irvin Diaz Jr.  MRN: [id number]    PROCEDURE DATE: 10/10/2024    Diagnosis: Chronic Knee Pain, Primary osteoarthritis of the knee  CPT code:  79539 (Arthrocentesis, aspiration and/or injection, large joint or bursa (e.g., shoulder, hip, knee, subacromial bursa); with ultrasound guidance) ;  (synvisc)  Postprocedural Diagnosis: Same  Needle Type: - Stimuplex 21G x 100mm needle    Solution injected: A 6 ml prefilled syringe of Synvisc    Injection # 1    Estimated Blood Loss - <2ml  Drains: None  Specimens Removed: None  Urine Output - Not Measured  Complications: None  Outcome: Good    Informed Consent:  The patient's condition and proposed procedures, risks (including complications of nerve damage,  bleeding, infection, and failure of pain relief), and alternatives were discussed with the patient or responsible party.  The patient's/responsible party's questions were answered.  The patient/responsible party appeared to understand and chose to proceed.  Informed consent was obtained.    Procedure in Detail:  The procedure was performed in the procedure treatment room.  After obtaining consent, the patient was placed in a supine position with the above noted knee in slight degree of flexion. The site for the injection was palpated, identified with ultrasound imaging and marked.    The skin overlying the target site of injection was prepped and draped in an aseptic fashion.      Procedural Pause:  A procedural pause verifying correct patient, medical record number, allergies, medications to be administered, current vital signs, and surgical site was performed immediately prior to beginning the procedure.    The skin and subcutaneous tissue overlying the target site of the injection was anesthetized using 3-5 ml of 1% lidocaine MPF with a 25-gauge, 1½ -inch needle.      The above noted needle was then  inserted horizontally, proximal to the superior and lateral edge of the patella, between the suprapatellar fat pad and prefemoral fat pad.  The needle was then slowly advanced until it slid into the suprapatellar synovial recess.  After negative aspiration for heme, the above noted solution was injected slowly.  The needle was then retracted and a sterile bandage was placed over the injection site.    The patient tolerated the procedure well and without complications. After meeting discharge criteria, the patient was discharged home safely.    The above noted procedure was not repeated on the opposite side.    DISCUSSION:  Today we performed a ultrasound guided knee injection.  The patient was informed that it may take several days for the medication to reach its effect and they should continue their regular pain medications as prescribed.  The patient was advised to relax and avoid any heavy lifting or excessive bending for 24 hours.   He was advised that he may return to his usual activities after 24 hours if he is otherwise feeling well.  The patient was advised not to bathe or soak in water for 24 hours but that showering would be acceptable.  The patient was instructed that if he experienced fever or chills, new weakness, new sensory changes, any changes in bowel or bladder habits, worsening back pain, new headache, neck stiffness, or other new symptoms, that he should contact the pain clinic immediately or dial 911 if unable to reach the pain clinic.      Note Electronically Signed By:  Robbin Abernathy

## 2024-10-21 ENCOUNTER — TELEPHONE (OUTPATIENT)
Dept: PRIMARY CARE CLINIC | Facility: CLINIC | Age: 49
End: 2024-10-21
Payer: MEDICARE

## 2024-10-21 NOTE — TELEPHONE ENCOUNTER
----- Message from Elda sent at 10/21/2024  8:17 AM CDT -----  Regarding: nurse  Type: Requesting to speak with nurse    Who Called: PT  Regarding: Blood In Urine   Would the patient rather a call back or a response via [facility]? Call back  Best Call Back Number: [phone]  Additional Information: Please call, Thank You

## 2024-10-22 ENCOUNTER — OFFICE VISIT (OUTPATIENT)
Dept: PRIMARY CARE CLINIC | Facility: CLINIC | Age: 49
End: 2024-10-22
Payer: MEDICARE

## 2024-10-22 VITALS
RESPIRATION RATE: 18 BRPM | DIASTOLIC BLOOD PRESSURE: 70 MMHG | OXYGEN SATURATION: 96 % | HEART RATE: 98 BPM | TEMPERATURE: 98 F | HEIGHT: 72 IN | WEIGHT: 228.94 LBS | SYSTOLIC BLOOD PRESSURE: 134 MMHG | BODY MASS INDEX: 31.01 KG/M2

## 2024-10-22 DIAGNOSIS — R31.9 HEMATURIA, UNSPECIFIED TYPE: ICD-10-CM

## 2024-10-22 DIAGNOSIS — Z12.5 ENCOUNTER FOR SCREENING FOR MALIGNANT NEOPLASM OF PROSTATE: ICD-10-CM

## 2024-10-22 DIAGNOSIS — Z23 NEED FOR VACCINATION: ICD-10-CM

## 2024-10-22 DIAGNOSIS — N30.01 ACUTE CYSTITIS WITH HEMATURIA: ICD-10-CM

## 2024-10-22 DIAGNOSIS — R22.41 LOCALIZED SWELLING OF RIGHT LOWER LEG: ICD-10-CM

## 2024-10-22 DIAGNOSIS — R31.0 GROSS HEMATURIA: ICD-10-CM

## 2024-10-22 DIAGNOSIS — R31.0 GROSS HEMATURIA: Primary | ICD-10-CM

## 2024-10-22 LAB
BACTERIA #/AREA URNS HPF: ABNORMAL /HPF
BILIRUB SERPL-MCNC: NORMAL MG/DL
BILIRUB UR QL STRIP: NEGATIVE
BLOOD URINE, POC: NORMAL
CLARITY UR: CLEAR
CLARITY, POC UA: NORMAL
COLOR UR: YELLOW
COLOR, POC UA: NORMAL
GLUCOSE UR QL STRIP: NEGATIVE
GLUCOSE UR QL STRIP: NORMAL
HGB UR QL STRIP: ABNORMAL
KETONES UR QL STRIP: NEGATIVE
KETONES UR QL STRIP: NORMAL
LEUKOCYTE ESTERASE UR QL STRIP: ABNORMAL
LEUKOCYTE ESTERASE URINE, POC: NORMAL
MICROSCOPIC COMMENT: ABNORMAL
NITRITE UR QL STRIP: NEGATIVE
NITRITE, POC UA: NORMAL
PH UR STRIP: 6 [PH] (ref 5–8)
PH, POC UA: 6
PROT UR QL STRIP: NEGATIVE
PROTEIN, POC: NORMAL
RBC #/AREA URNS HPF: 4 /HPF (ref 0–4)
SP GR UR STRIP: 1.01 (ref 1–1.03)
SPECIFIC GRAVITY, POC UA: 1.01
SQUAMOUS #/AREA URNS HPF: 0 /HPF
URN SPEC COLLECT METH UR: ABNORMAL
UROBILINOGEN UR STRIP-ACNC: ABNORMAL EU/DL
UROBILINOGEN, POC UA: 1
WBC #/AREA URNS HPF: 40 /HPF (ref 0–5)

## 2024-10-22 PROCEDURE — 87088 URINE BACTERIA CULTURE: CPT | Performed by: STUDENT IN AN ORGANIZED HEALTH CARE EDUCATION/TRAINING PROGRAM

## 2024-10-22 PROCEDURE — 3066F NEPHROPATHY DOC TX: CPT | Mod: CPTII,S$GLB,, | Performed by: STUDENT IN AN ORGANIZED HEALTH CARE EDUCATION/TRAINING PROGRAM

## 2024-10-22 PROCEDURE — 3061F NEG MICROALBUMINURIA REV: CPT | Mod: CPTII,S$GLB,, | Performed by: STUDENT IN AN ORGANIZED HEALTH CARE EDUCATION/TRAINING PROGRAM

## 2024-10-22 PROCEDURE — 87186 SC STD MICRODIL/AGAR DIL: CPT | Performed by: STUDENT IN AN ORGANIZED HEALTH CARE EDUCATION/TRAINING PROGRAM

## 2024-10-22 PROCEDURE — 1159F MED LIST DOCD IN RCRD: CPT | Mod: CPTII,S$GLB,, | Performed by: STUDENT IN AN ORGANIZED HEALTH CARE EDUCATION/TRAINING PROGRAM

## 2024-10-22 PROCEDURE — 87077 CULTURE AEROBIC IDENTIFY: CPT | Performed by: STUDENT IN AN ORGANIZED HEALTH CARE EDUCATION/TRAINING PROGRAM

## 2024-10-22 PROCEDURE — 99214 OFFICE O/P EST MOD 30 MIN: CPT | Mod: 25,S$GLB,, | Performed by: STUDENT IN AN ORGANIZED HEALTH CARE EDUCATION/TRAINING PROGRAM

## 2024-10-22 PROCEDURE — 3044F HG A1C LEVEL LT 7.0%: CPT | Mod: CPTII,S$GLB,, | Performed by: STUDENT IN AN ORGANIZED HEALTH CARE EDUCATION/TRAINING PROGRAM

## 2024-10-22 PROCEDURE — 81001 URINALYSIS AUTO W/SCOPE: CPT | Performed by: STUDENT IN AN ORGANIZED HEALTH CARE EDUCATION/TRAINING PROGRAM

## 2024-10-22 PROCEDURE — 99999 PR PBB SHADOW E&M-EST. PATIENT-LVL V: CPT | Mod: PBBFAC,,, | Performed by: STUDENT IN AN ORGANIZED HEALTH CARE EDUCATION/TRAINING PROGRAM

## 2024-10-22 PROCEDURE — 3008F BODY MASS INDEX DOCD: CPT | Mod: CPTII,S$GLB,, | Performed by: STUDENT IN AN ORGANIZED HEALTH CARE EDUCATION/TRAINING PROGRAM

## 2024-10-22 PROCEDURE — 87086 URINE CULTURE/COLONY COUNT: CPT | Performed by: STUDENT IN AN ORGANIZED HEALTH CARE EDUCATION/TRAINING PROGRAM

## 2024-10-22 PROCEDURE — 81002 URINALYSIS NONAUTO W/O SCOPE: CPT | Mod: S$GLB,,, | Performed by: STUDENT IN AN ORGANIZED HEALTH CARE EDUCATION/TRAINING PROGRAM

## 2024-10-22 PROCEDURE — 3078F DIAST BP <80 MM HG: CPT | Mod: CPTII,S$GLB,, | Performed by: STUDENT IN AN ORGANIZED HEALTH CARE EDUCATION/TRAINING PROGRAM

## 2024-10-22 PROCEDURE — G0008 ADMIN INFLUENZA VIRUS VAC: HCPCS | Mod: S$GLB,,, | Performed by: STUDENT IN AN ORGANIZED HEALTH CARE EDUCATION/TRAINING PROGRAM

## 2024-10-22 PROCEDURE — 90656 IIV3 VACC NO PRSV 0.5 ML IM: CPT | Mod: S$GLB,,, | Performed by: STUDENT IN AN ORGANIZED HEALTH CARE EDUCATION/TRAINING PROGRAM

## 2024-10-22 PROCEDURE — 3075F SYST BP GE 130 - 139MM HG: CPT | Mod: CPTII,S$GLB,, | Performed by: STUDENT IN AN ORGANIZED HEALTH CARE EDUCATION/TRAINING PROGRAM

## 2024-10-22 RX ORDER — NITROFURANTOIN 25; 75 MG/1; MG/1
100 CAPSULE ORAL 2 TIMES DAILY
Qty: 10 CAPSULE | Refills: 0 | Status: SHIPPED | OUTPATIENT
Start: 2024-10-22 | End: 2024-10-29

## 2024-10-22 RX ORDER — NITROFURANTOIN 25; 75 MG/1; MG/1
100 CAPSULE ORAL 2 TIMES DAILY
Qty: 10 CAPSULE | Refills: 0 | Status: SHIPPED | OUTPATIENT
Start: 2024-10-22 | End: 2024-10-22 | Stop reason: SDUPTHER

## 2024-10-22 NOTE — PATIENT INSTRUCTIONS
"Gross hematuria  -     POCT URINE DIPSTICK WITHOUT MICROSCOPE  -     URINALYSIS  -     Urine culture  -     Ambulatory referral/consult to Urology; Future; Expected date: 10/29/2024  -     CBC Auto Differential; Future; Expected date: 10/22/2024  -     Comprehensive Metabolic Panel; Future; Expected date: 10/22/2024  -     PSA, Screening; Future; Expected date: 10/22/2024  -     Protime-INR; Future; Expected date: 10/22/2024    Need for vaccination  -     influenza (Flulaval, Fluzone, Fluarix) 45 mcg/0.5 mL IM vaccine (> or = 6 mo) 0.5 mL    Localized swelling of right lower leg  -     CBC Auto Differential; Future; Expected date: 10/22/2024  -     Comprehensive Metabolic Panel; Future; Expected date: 10/22/2024  -     PSA, Screening; Future; Expected date: 10/22/2024    Hematuria, unspecified type  -     Ambulatory referral/consult to Urology; Future; Expected date: 10/29/2024  -     CBC Auto Differential; Future; Expected date: 10/22/2024  -     Comprehensive Metabolic Panel; Future; Expected date: 10/22/2024  -     PSA, Screening; Future; Expected date: 10/22/2024  -     Protime-INR; Future; Expected date: 10/22/2024    Encounter for screening for malignant neoplasm of prostate  -     PSA, Screening; Future; Expected date: 10/22/2024         Assessment & Plan    Assessed reported hematuria since Sunday, initially copious bleeding now reduced to "shadow" in urine  Reviewed recent labs from September 5th, noting trace blood in urine, lower than previous tests  Considered history of low platelet counts (72,000) and slightly elevated PT time  Evaluated recent imaging studies (ultrasound, bone scan, MRI) showing normal bladder and kidneys, with no stones or masses  Noted significant right leg swelling (4 cm difference) compared to left, despite previous negative venous ultrasound for DVT  Considered potential causes for hematuria, including bladder issues, prostate concerns, or vascular complications    URINARY TRACT " INFECTION (SUSPECTED):  - Urinalysis and urine dip ordered to check for blood and bacteria.    INFLUENZA VACCINATION:  - Flu shot offered (if patient consents).    LEG SWELLING:  - Consider referral to vascular surgeon for evaluation of right leg swelling.

## 2024-10-22 NOTE — PROGRESS NOTES
Assessment:       1. Gross hematuria    2. Need for vaccination    3. Localized swelling of right lower leg    4. Hematuria, unspecified type    5. Encounter for screening for malignant neoplasm of prostate    6. Acute cystitis with hematuria           Plan:       Gross hematuria  -     POCT URINE DIPSTICK WITHOUT MICROSCOPE  -     URINALYSIS  -     Urine culture  -     Ambulatory referral/consult to Urology; Future; Expected date: 10/29/2024  -     CBC Auto Differential; Future; Expected date: 10/22/2024  -     Comprehensive Metabolic Panel; Future; Expected date: 10/22/2024  -     PSA, Screening; Future; Expected date: 10/22/2024  -     Protime-INR; Future; Expected date: 10/22/2024  -     nitrofurantoin, macrocrystal-monohydrate, (MACROBID) 100 MG capsule; Take 1 capsule (100 mg total) by mouth 2 (two) times daily. for 5 days  Dispense: 10 capsule; Refill: 0    Need for vaccination  -     influenza (Flulaval, Fluzone, Fluarix) 45 mcg/0.5 mL IM vaccine (> or = 6 mo) 0.5 mL    Localized swelling of right lower leg  -     CBC Auto Differential; Future; Expected date: 10/22/2024  -     Comprehensive Metabolic Panel; Future; Expected date: 10/22/2024  -     PSA, Screening; Future; Expected date: 10/22/2024    Hematuria, unspecified type  -     Ambulatory referral/consult to Urology; Future; Expected date: 10/29/2024  -     CBC Auto Differential; Future; Expected date: 10/22/2024  -     Comprehensive Metabolic Panel; Future; Expected date: 10/22/2024  -     PSA, Screening; Future; Expected date: 10/22/2024  -     Protime-INR; Future; Expected date: 10/22/2024    Encounter for screening for malignant neoplasm of prostate  -     PSA, Screening; Future; Expected date: 10/22/2024    Acute cystitis with hematuria  -     nitrofurantoin, macrocrystal-monohydrate, (MACROBID) 100 MG capsule; Take 1 capsule (100 mg total) by mouth 2 (two) times daily. for 5 days  Dispense: 10 capsule; Refill: 0         Assessment & Plan   "  Assessed reported hematuria since Sunday, initially copious bleeding now reduced to "shadow" in urine  Reviewed recent labs from September 5th, noting trace blood in urine, lower than previous tests  Considered history of low platelet counts (72,000) and slightly elevated PT time  Evaluated recent imaging studies (ultrasound, bone scan, MRI) showing normal bladder and kidneys, with no stones or masses  Noted significant right leg swelling (4 cm difference) compared to left, despite previous negative venous ultrasound for DVT  Considered potential causes for hematuria, including bladder issues, prostate concerns, or vascular complications    URINARY TRACT INFECTION (SUSPECTED):  - Urinalysis and urine dip ordered to check for blood and bacteria.    INFLUENZA VACCINATION:  - Flu shot offered (if patient consents).    LEG SWELLING:  - Consider referral to vascular surgeon for evaluation of right leg swelling.                This note was generated with the assistance of ambient listening technology. Verbal consent was obtained by the patient and accompanying visitor(s) for the recording of patient appointment to facilitate this note. I attest to having reviewed and edited the generated note for accuracy, though some syntax or spelling errors may persist. Please contact the author of this note for any clarification.        Subjective:           Patient ID: Irvin Diaz Jr.   Age:  49 y.o.  Sex: male     Chief Complaint:   Leg Swelling (Bilateral/) and Hematuria (Started on 10/20)      History of Present Illness:    Irvin Diaz Jr. is a 49 y.o. male who presents today with a chief complaint of Leg Swelling (Bilateral/) and Hematuria (Started on 10/20)  .    History of Present Illness    CHIEF COMPLAINT:  Irvin presents today for blood in urine.    HEMATURIA:  He reports experiencing blood in urine since Sunday, initially mistaking it for urine dripping due to the large amount. The bleeding has persisted " "but decreased in volume, currently noting a "shadow" in his urine. He denies urinary control issues, burning with urination, or trauma to the area. Recent lab work from September 5th shows trace blood in urine, which is less than previous analyses from 6 months and 1 year ago where 1+ or 2+ blood was noted.    MEDICAL HISTORY:  He has a history of low platelet counts, with recent levels at 72. He experienced a nosebleed in April lasting 24-40 hours that resolved without cauterization. He denies current bleeding issues, including when brushing teeth. He has liver issues managed by Dr. Sykes. Recent ultrasound shows normal liver and kidneys. He has been diagnosed with osteoporosis based on a recent lumbar spine bone scan. He reports an ankle injury in 1993 or 1994.    RECENT LAB RESULTS:  PSA level was 1.6, within normal range. Vitamin D levels were low. Thyroid function tests were normal. Iron levels were normal. PT time was slightly elevated but lower than previous results from the past year. He has mild anemia, though blood counts were higher than the last two checks.    RIGHT LEG SWELLING:  He reports right leg swelling with a 4 cm difference between the right and left leg, which is not typical. A previous ultrasound to evaluate for suspected blood clot was negative.    MEDICATIONS:  He reports taking a diuretic medication.      ROS:  Genitourinary: +hematuria, -difficulty urinating           Review of Systems   Constitutional: Negative.  Negative for fatigue and fever.   HENT: Negative.  Negative for congestion, postnasal drip, rhinorrhea and sinus pressure.    Eyes: Negative.    Respiratory:  Negative for cough, shortness of breath and wheezing.    Cardiovascular: Negative.  Negative for chest pain, palpitations and leg swelling.   Gastrointestinal: Negative.  Negative for constipation, diarrhea, nausea and vomiting.   Endocrine: Negative.    Genitourinary:  Positive for hematuria. Negative for difficulty " urinating, dysuria, flank pain, frequency, genital sores, penile discharge, penile pain, penile swelling and testicular pain.   Musculoskeletal:  Positive for arthralgias, back pain (left hip, left lower back pain.), gait problem and joint swelling.   Allergic/Immunologic: Negative for food allergies.   Neurological:  Negative for weakness, light-headedness and headaches.   Psychiatric/Behavioral: Negative.  The patient is not nervous/anxious.            Objective:        Vitals:    10/22/24 1331   BP: 134/70   BP Location: Right arm   Patient Position: Sitting   Pulse: 98   Resp: 18   Temp: 97.5 °F (36.4 °C)   TempSrc: Temporal   SpO2: 96%   Weight: 103.8 kg (228 lb 15.2 oz)   Height: 6' (1.829 m)       Body mass index is 31.05 kg/m².              Physical Exam  Constitutional:       Appearance: Normal appearance. He is not toxic-appearing.      Comments: As per BMI.   HENT:      Head: Normocephalic and atraumatic.      Right Ear: External ear normal.      Left Ear: External ear normal.      Nose: No congestion.      Mouth/Throat:      Mouth: Mucous membranes are moist.      Pharynx: Oropharynx is clear.   Eyes:      Extraocular Movements: Extraocular movements intact.      Conjunctiva/sclera: Conjunctivae normal.   Cardiovascular:      Rate and Rhythm: Normal rate and regular rhythm.      Heart sounds: No murmur heard.  Pulmonary:      Effort: Pulmonary effort is normal. No respiratory distress.      Breath sounds: No wheezing.   Abdominal:      General: Bowel sounds are normal.      Palpations: Abdomen is soft.   Musculoskeletal:         General: No swelling.      Cervical back: Normal range of motion.      Right lower leg: Edema (3+, with 4cm greater size than left.) present.      Left lower leg: Edema (2+) present.   Skin:     General: Skin is warm.      Capillary Refill: Capillary refill takes less than 2 seconds.      Coloration: Skin is not jaundiced.   Neurological:      General: No focal deficit present.       Mental Status: He is alert and oriented to person, place, and time.      Motor: No weakness.   Psychiatric:         Mood and Affect: Mood normal.         Physical Exam    Extremities: Right leg edema. Right leg circumference: 30.5 cm. Left leg circumference: 26 cm. 4 cm difference in leg circumference (right larger than left).               Past Medical History:   Diagnosis Date    Alcohol withdrawal 5/1/2022    Alcoholic cirrhosis of liver     Alcoholic ketoacidosis 6/6/2021    Arthritis     ATN (acute tubular necrosis)     CHF (congestive heart failure)     Diverticulitis 01/2020    Esophageal varices     GERD (gastroesophageal reflux disease)     GI bleed     Hip arthritis     Left    Hypertension     Liver cirrhosis     Macrocytic anemia     Pulmonary embolism 08/2018    Unprovoked DVT.  Stop Coumadin due to GIB    Thrombocytopenia        Lab Results   Component Value Date     (L) 10/22/2024     09/02/2018    K 4.2 10/22/2024     10/22/2024     09/02/2018    CO2 18 (L) 10/22/2024    BUN 18 10/22/2024    CREATININE 2.2 (H) 10/22/2024    CREATININE 0.91 09/02/2018    GLUCOSE 103 (H) 03/30/2021    ANIONGAP 11 10/22/2024     Lab Results   Component Value Date    HGBA1C 4.8 06/12/2024     Lab Results   Component Value Date     (H) 06/29/2024    BNP 48 04/28/2023     (H) 07/18/2022       Lab Results   Component Value Date    WBC 6.24 10/22/2024    HGB 11.9 (L) 10/22/2024    HCT 36.4 (L) 10/22/2024    HCT 25 (L) 11/01/2022    PLT 47 (L) 10/22/2024    GRAN 5.3 10/22/2024    GRAN 84.8 (H) 10/22/2024     Lab Results   Component Value Date    CHOL 167 07/01/2024    HDL 28 (L) 07/01/2024    LDLCALC 128.0 07/01/2024    TRIG 55 07/01/2024        Outpatient Encounter Medications as of 10/22/2024   Medication Sig Dispense Refill    ferrous gluconate (FERGON) 240 (27 FE) MG tablet Take 2 tablets (480 mg total) by mouth 2 (two) times daily with meals. 120 tablet 3    folic acid (FOLVITE)  1 MG tablet Take 1 tablet (1 mg total) by mouth once daily. 30 tablet 5    metoprolol tartrate (LOPRESSOR) 50 MG tablet Take 1 tablet (50 mg total) by mouth 2 (two) times daily. Do not take if heart rate is less than 60 60 tablet 11    nortriptyline (PAMELOR) 25 MG capsule Take 1 capsule (25 mg total) by mouth every evening. 90 capsule 3    rifAXIMin (XIFAXAN) 550 mg Tab Take 1 tablet (550 mg total) by mouth 2 (two) times daily. 60 tablet 11    spironolactone (ALDACTONE) 25 MG tablet Take 1 tablet (25 mg total) by mouth once daily. 90 tablet 3    tadalafiL (CIALIS) 20 MG Tab Take 1 tablet (20 mg total) by mouth daily as needed (erectile dysfunction). 15 tablet 11    thiamine 100 MG tablet Take 1 tablet (100 mg total) by mouth once daily. 30 tablet 5    traMADoL (ULTRAM) 50 mg tablet Take 1 tablet (50 mg total) by mouth 4 (four) times daily as needed for Pain. 120 each 2    nitrofurantoin, macrocrystal-monohydrate, (MACROBID) 100 MG capsule Take 1 capsule (100 mg total) by mouth 2 (two) times daily. for 5 days 10 capsule 0    [DISCONTINUED] sildenafiL (VIAGRA) 50 MG tablet Take 50 mg by mouth daily as needed for Erectile Dysfunction.       Facility-Administered Encounter Medications as of 10/22/2024   Medication Dose Route Frequency Provider Last Rate Last Admin    [COMPLETED] influenza (Flulaval, Fluzone, Fluarix) 45 mcg/0.5 mL IM vaccine (> or = 6 mo) 0.5 mL  0.5 mL Intramuscular 1 time in Clinic/HOD Elver Whaley MD   0.5 mL at 10/22/24 1410

## 2024-10-24 NOTE — PROGRESS NOTES
Subjective:      Irvin Diaz Jr. is a 49 y.o. male who returns today regarding his Gross hematuria.    Established patient of Dr. Ma; new to me today.  Presents with c/o gross hematuria.   UC (10/22): staph aureus (completed Macrobid but finalized report today shows resistance) States that GH has resolved. Denies f/c/n/v.     MRI ABD (8/22): negative for stones, hydro, masses; +b/l cyst    He has a history of CHF, alcoholic cirrhosis with esophageal varices, and thrombocytopenia.     Reports history of ED; minimal results with Cialis; requesting trimix rx       The following portions of the patient's history were reviewed and updated as appropriate: allergies, current medications, past family history, past medical history, past social history, past surgical history and problem list.    Review of Systems  A comprehensive multipoint review of systems was negative except as otherwise stated in the HPI.     Objective:   Vitals: BP (!) 140/76 (BP Location: Left arm, Patient Position: Sitting)   Pulse (!) 116   Ht 6' (1.829 m)   Wt 112.2 kg (247 lb 7.5 oz)   BMI 33.56 kg/m²     Physical Exam   General: alert and oriented, no acute distress  Respiratory: Symmetric expansion, non-labored breathing  Cardiovascular: regular rate and rhythm, no peripheral edema  Abdomen: soft, non distended  Genitourinary: defer   Skin: normal coloration and turgor, no rashes, no suspicious skin lesions noted  Neuro: no gross deficits  Psych: normal judgment and insight, normal mood/affect, and non-anxious    Lab Review   Urinalysis demonstrates   Lab Results   Component Value Date    WBC 6.24 10/22/2024    HGB 11.9 (L) 10/22/2024    HCT 36.4 (L) 10/22/2024    HCT 25 (L) 11/01/2022     (H) 10/22/2024    PLT 47 (L) 10/22/2024     Lab Results   Component Value Date    CREATININE 2.2 (H) 10/22/2024    CREATININE 0.91 09/02/2018    BUN 18 10/22/2024     Lab Results   Component Value Date    PSA 1.9 10/22/2024       Imaging    MRI ABDOMEN-PELVIS W W/O CONTRAST (XPD)     CLINICAL HISTORY:  Transplant eval (pre-op) (Ped 0-18y);  Awaiting organ transplant status     TECHNIQUE:  MRI of the abdomen and pelvis before and after intravenous administration of 10 mL Gadavist.     COMPARISON:  Abdominal ultrasound 09/05/2024; CT abdomen pelvis 05/13/2023     FINDINGS:  Sequences are degraded by patient motion artifact.     LOWER THORAX: Unremarkable.     LIVER: No global hepatic signal abnormality. Nodular liver contour.  Punctate cyst in the right hepatic lobe.  No focal lesion concerning for HCC.     BILIARY: Normal gallbladder. No biliary ductal dilatation.     SPLEEN: Splenomegaly measuring 13.0 cm.  Small splenules.     PANCREAS: No focal masses or ductal dilatation.     ADRENALS: No adrenal nodules.     KIDNEYS/URETERS: No hydronephrosis or solid mass lesions.  Small renal cysts bilaterally.     BLADDER/PELVIC ORGANS: Unremarkable.     PERITONEUM / RETROPERITONEUM: Trace perihepatic free fluid.  Bilateral perirenal fat stranding, which is nonspecific.     LYMPH NODES: Enlarged upper abdominal lymph nodes measuring up to 1.4 cm.     VESSELS: Portal veins are patent, noting diminutive right portal veins.  There are upper abdominal venous collaterals including a patent paraumbilical vein.     GI TRACT: Postoperative changes from right hemicolectomy.  Colonic diverticulosis.  No distention or wall thickening.     BONES AND SOFT TISSUES: Bilateral gynecomastia.  No marrow replacing lesions.  Avascular necrosis in both femoral heads, with articular surface collapse and advanced degenerative changes on the left.  Bilateral hip joint effusions with synovitis.     Impression:     Cirrhosis with signs of portal hypertension including splenomegaly, portosystemic collaterals, and ascites.     No liver lesion concerning for HCC.     Upper abdominal lymphadenopathy, presumably reactive.     Other findings as described.        Electronically signed  by:Vasquez Ramon  Date:                                            09/27/2024  Time:                                           15:35        Exam Ended: 09/27/24 15:02 CDT Last Resulted: 09/27/24 15:35 CDT            Bladder Scan PVR: 0 cc        Assessment and Plan:   1. Erectile dysfunction, unspecified erectile dysfunction type  --Trimix    2.  Acute cystitis with hematuria  --suspect gross hematuria directly related to UTI.  Patient completed Macrobid however sensitivity report back shows sensitivity Bactrim and ampicillin.  Will treat with Bactrim and have patient follow-up in 1 month to reassess urine, if hematuria remains then will proceed with full workup    - sulfamethoxazole-trimethoprim 400-80mg (BACTRIM,SEPTRA) 400-80 mg per tablet; Take 1 tablet by mouth 2 (two) times daily. for 10 days  Dispense: 20 tablet; Refill: 0       --rtc with Trimix for injection teaching     This note is dictated on M*Modal word recognition program.  There are word recognition mistakes that are occasionally missed on review.

## 2024-10-26 LAB — BACTERIA UR CULT: ABNORMAL

## 2024-11-04 ENCOUNTER — OFFICE VISIT (OUTPATIENT)
Dept: UROLOGY | Facility: CLINIC | Age: 49
End: 2024-11-04
Payer: MEDICARE

## 2024-11-04 VITALS
DIASTOLIC BLOOD PRESSURE: 76 MMHG | HEIGHT: 72 IN | BODY MASS INDEX: 33.52 KG/M2 | WEIGHT: 247.44 LBS | SYSTOLIC BLOOD PRESSURE: 140 MMHG | HEART RATE: 116 BPM

## 2024-11-04 DIAGNOSIS — R31.0 GROSS HEMATURIA: ICD-10-CM

## 2024-11-04 DIAGNOSIS — D50.0 IRON DEFICIENCY ANEMIA DUE TO CHRONIC BLOOD LOSS: ICD-10-CM

## 2024-11-04 DIAGNOSIS — N30.01 ACUTE CYSTITIS WITH HEMATURIA: ICD-10-CM

## 2024-11-04 DIAGNOSIS — N52.2 DRUG-INDUCED ERECTILE DYSFUNCTION: ICD-10-CM

## 2024-11-04 DIAGNOSIS — G93.40 ENCEPHALOPATHY: ICD-10-CM

## 2024-11-04 DIAGNOSIS — N52.9 ERECTILE DYSFUNCTION, UNSPECIFIED ERECTILE DYSFUNCTION TYPE: Primary | ICD-10-CM

## 2024-11-04 LAB
BILIRUB SERPL-MCNC: NEGATIVE MG/DL
BLOOD URINE, POC: NORMAL
CLARITY, POC UA: CLEAR
COLOR, POC UA: NORMAL
GLUCOSE UR QL STRIP: NEGATIVE
KETONES UR QL STRIP: NEGATIVE
LEUKOCYTE ESTERASE URINE, POC: NORMAL
NITRITE, POC UA: NEGATIVE
PH, POC UA: 6.5
POC RESIDUAL URINE VOLUME: 0 ML (ref 0–100)
PROTEIN, POC: NORMAL
SPECIFIC GRAVITY, POC UA: 1.01
UROBILINOGEN, POC UA: NORMAL

## 2024-11-04 PROCEDURE — 1159F MED LIST DOCD IN RCRD: CPT | Mod: CPTII,S$GLB,, | Performed by: NURSE PRACTITIONER

## 2024-11-04 PROCEDURE — 3008F BODY MASS INDEX DOCD: CPT | Mod: CPTII,S$GLB,, | Performed by: NURSE PRACTITIONER

## 2024-11-04 PROCEDURE — 99999 PR PBB SHADOW E&M-EST. PATIENT-LVL IV: CPT | Mod: PBBFAC,,, | Performed by: NURSE PRACTITIONER

## 2024-11-04 PROCEDURE — 51798 US URINE CAPACITY MEASURE: CPT | Mod: S$GLB,,, | Performed by: NURSE PRACTITIONER

## 2024-11-04 PROCEDURE — 3061F NEG MICROALBUMINURIA REV: CPT | Mod: CPTII,S$GLB,, | Performed by: NURSE PRACTITIONER

## 2024-11-04 PROCEDURE — 99214 OFFICE O/P EST MOD 30 MIN: CPT | Mod: 25,S$GLB,, | Performed by: NURSE PRACTITIONER

## 2024-11-04 PROCEDURE — 1160F RVW MEDS BY RX/DR IN RCRD: CPT | Mod: CPTII,S$GLB,, | Performed by: NURSE PRACTITIONER

## 2024-11-04 PROCEDURE — 81002 URINALYSIS NONAUTO W/O SCOPE: CPT | Mod: S$GLB,,, | Performed by: NURSE PRACTITIONER

## 2024-11-04 PROCEDURE — 3078F DIAST BP <80 MM HG: CPT | Mod: CPTII,S$GLB,, | Performed by: NURSE PRACTITIONER

## 2024-11-04 PROCEDURE — 3077F SYST BP >= 140 MM HG: CPT | Mod: CPTII,S$GLB,, | Performed by: NURSE PRACTITIONER

## 2024-11-04 PROCEDURE — 3044F HG A1C LEVEL LT 7.0%: CPT | Mod: CPTII,S$GLB,, | Performed by: NURSE PRACTITIONER

## 2024-11-04 PROCEDURE — 3066F NEPHROPATHY DOC TX: CPT | Mod: CPTII,S$GLB,, | Performed by: NURSE PRACTITIONER

## 2024-11-04 RX ORDER — SULFAMETHOXAZOLE AND TRIMETHOPRIM 400; 80 MG/1; MG/1
1 TABLET ORAL 2 TIMES DAILY
Qty: 20 TABLET | Refills: 0 | Status: SHIPPED | OUTPATIENT
Start: 2024-11-04 | End: 2024-11-04

## 2024-11-04 RX ORDER — SULFAMETHOXAZOLE AND TRIMETHOPRIM 400; 80 MG/1; MG/1
1 TABLET ORAL 2 TIMES DAILY
Qty: 20 TABLET | Refills: 0 | Status: SHIPPED | OUTPATIENT
Start: 2024-11-04 | End: 2024-11-15

## 2024-11-04 RX ORDER — PAPAVERINE HYDROCHLORIDE 30 MG/ML
INJECTION INTRAMUSCULAR; INTRAVENOUS
Qty: 10 ML | Refills: 12 | Status: SHIPPED | OUTPATIENT
Start: 2024-11-04

## 2024-11-07 RX ORDER — QUINIDINE GLUCONATE 324 MG
480 TABLET, EXTENDED RELEASE ORAL 2 TIMES DAILY WITH MEALS
Qty: 120 TABLET | Refills: 3 | Status: SHIPPED | OUTPATIENT
Start: 2024-11-07

## 2024-11-15 ENCOUNTER — TELEPHONE (OUTPATIENT)
Dept: NEPHROLOGY | Facility: CLINIC | Age: 49
End: 2024-11-15
Payer: MEDICARE

## 2024-12-03 ENCOUNTER — TELEPHONE (OUTPATIENT)
Dept: NEPHROLOGY | Facility: CLINIC | Age: 49
End: 2024-12-03
Payer: MEDICARE

## 2024-12-03 DIAGNOSIS — N18.30 STAGE 3 CHRONIC KIDNEY DISEASE, UNSPECIFIED WHETHER STAGE 3A OR 3B CKD: Primary | ICD-10-CM

## 2024-12-03 NOTE — TELEPHONE ENCOUNTER
----- Message from Med Assistant Rice sent at 12/3/2024  1:53 PM CST -----  Regarding: FW: Missed Call  Contact: MARIA ISABEL LUX JR. [[id number]]    ----- Message -----  From: Sharon Moss  Sent: 12/3/2024   8:27 AM CST  To: [facility] Staff  Subject: Missed Call                                      Returning a Missed Call    Caller: MARIA ISABEL LUX JR. [[id number]]    Returning call to :  Roseline    Caller can be reached @:  [phone] (home)       Nature of the call:  Missed Call

## 2025-01-02 ENCOUNTER — TELEPHONE (OUTPATIENT)
Dept: PAIN MEDICINE | Facility: CLINIC | Age: 50
End: 2025-01-02

## 2025-01-02 ENCOUNTER — OFFICE VISIT (OUTPATIENT)
Dept: PAIN MEDICINE | Facility: CLINIC | Age: 50
End: 2025-01-02
Payer: MEDICARE

## 2025-01-02 VITALS
BODY MASS INDEX: 33.51 KG/M2 | DIASTOLIC BLOOD PRESSURE: 90 MMHG | HEIGHT: 72 IN | SYSTOLIC BLOOD PRESSURE: 150 MMHG | HEART RATE: 99 BPM | WEIGHT: 247.38 LBS

## 2025-01-02 DIAGNOSIS — F11.90 CHRONIC, CONTINUOUS USE OF OPIOIDS: ICD-10-CM

## 2025-01-02 DIAGNOSIS — G89.4 CHRONIC PAIN SYNDROME: ICD-10-CM

## 2025-01-02 DIAGNOSIS — M17.12 PRIMARY OSTEOARTHRITIS OF LEFT KNEE: Primary | ICD-10-CM

## 2025-01-02 DIAGNOSIS — M87.052 AVASCULAR NECROSIS OF HIP, LEFT: Primary | ICD-10-CM

## 2025-01-02 PROCEDURE — 3077F SYST BP >= 140 MM HG: CPT | Mod: CPTII,S$GLB,, | Performed by: STUDENT IN AN ORGANIZED HEALTH CARE EDUCATION/TRAINING PROGRAM

## 2025-01-02 PROCEDURE — 1159F MED LIST DOCD IN RCRD: CPT | Mod: CPTII,S$GLB,, | Performed by: STUDENT IN AN ORGANIZED HEALTH CARE EDUCATION/TRAINING PROGRAM

## 2025-01-02 PROCEDURE — 3008F BODY MASS INDEX DOCD: CPT | Mod: CPTII,S$GLB,, | Performed by: STUDENT IN AN ORGANIZED HEALTH CARE EDUCATION/TRAINING PROGRAM

## 2025-01-02 PROCEDURE — 99214 OFFICE O/P EST MOD 30 MIN: CPT | Mod: S$GLB,,, | Performed by: STUDENT IN AN ORGANIZED HEALTH CARE EDUCATION/TRAINING PROGRAM

## 2025-01-02 PROCEDURE — 99999 PR PBB SHADOW E&M-EST. PATIENT-LVL III: CPT | Mod: PBBFAC,,, | Performed by: STUDENT IN AN ORGANIZED HEALTH CARE EDUCATION/TRAINING PROGRAM

## 2025-01-02 PROCEDURE — 3080F DIAST BP >= 90 MM HG: CPT | Mod: CPTII,S$GLB,, | Performed by: STUDENT IN AN ORGANIZED HEALTH CARE EDUCATION/TRAINING PROGRAM

## 2025-01-02 RX ORDER — TRAMADOL HYDROCHLORIDE 50 MG/1
50 TABLET ORAL 4 TIMES DAILY PRN
Qty: 120 EACH | Refills: 2 | Status: SHIPPED | OUTPATIENT
Start: 2025-01-02 | End: 2025-04-02

## 2025-01-02 NOTE — PROGRESS NOTES
Chronic Pain - f/u    Referring Physician: No ref. provider found    Date: 01/02/2025     Re: Irvin Diaz Jr.  MR#: [id number]  YOB: 1975  Age: 49 y.o.    Chief Complaint: hip pain  Chief Complaint   Patient presents with    Follow-up     **This note is dictated using the M*Modal Fluency Direct word recognition program. There are word recognition mistakes that are occasionally missed on review.**    ASSESSMENT: 49 y.o. year old male with left hip pain, consistent with     1. Avascular necrosis of hip, left  traMADoL (ULTRAM) 50 mg tablet      2. Chronic, continuous use of opioids  traMADoL (ULTRAM) 50 mg tablet      3. Chronic pain syndrome  traMADoL (ULTRAM) 50 mg tablet          PLAN:     Right leg edema  -not a blood clot since US negative  -recommended discussing with PCP to adjust fluid pills  -continue compression stockings    Left knee pain likely OA 2/2 altered mechanics due to hip AVN  -pain with varus/valgus strain  -left knee x-ray shows KL grade 2 OA with medial joint space narrowing  -minimal edema. Pain worse with weight bearing.  -6/20/24 - knee injection CSI - 70%x1m  -He has failed PT in the knee before.  -He uses a knee brace.  -failed PT. Failed conservative therapy.  Cannot take NSAIDs due to low platelets  -MEDICAL NECESSITY FOR VISCOSUPPLEMENTATION USE: After thorough evaluation of the patient, I have determined that viscosupplementation treatment is medically necessary. The patient has painful degenerative joint disease (DJD) of the knee(s) with failure of conservative treatments including lifestyle modifications and rehabilitation exercises. Oral analgesics including NSAIDs have not adequately controlled the patient's symptoms or are contraindicated. There is radiographic evidence of Kellgren-Maury grade II (or greater) osteoarthritic (OA) changes, or if lack of radiographic evidence, there is arthroscopic or other evidence of chondrosis of the knee(s).   10/10/24 - L  knee synvisc-one inj. Helpful until fall the week before Cumma[place] - >50% until then  4/10/25@1pm- L syncisc-one    Avascular necrosis of the left hip   -s/p hip block without significant pain relief and complicated by Hematoma.   -tolerating oxycodone 5 mg BID PRN (he thinks his lucid dreams were due to EtOH withdrawal). No more lucid dreams. Switch back to tramadol  -switched back to tramadol because he has been doing better and does not think that he needs the oxycodone anymore.  -refill Tramadol QID. This has been working for him. Helps him function. Refill x3 months  - Medrol dose pack, helped while taking, but not sustained.  -not surgical candidate  -Not a candidate for further procedures due to bleeding risk  - Plts improved to ~110.  -buprenorphine likely not an option 2/2 to his ESLD and liver transplant status    Right hip pain  -XR Right hip given hx of avascular necrosis in the left hip. This showed moderate OA in the hip  9/28/23 - right hip injection - >50% relief for 6 months  10/8/24 - Right hip injection - no sed - no AC - 50% @ 3m. Recommended waiting as long as possible before a repeat.  -recommended minimizing repeats due to risks unless the pain becomes unbearable.    Allodynia  -stopped Nortriptyline to 50 mg nightly. Helps him sleep, but made him nauseous    Thorombocytopenia 2/2 cirrhosis  -platelets are 92 on 5/26/22, 82 on 9/7/22, 74 on 10/25/22, 110 on 5/18/23  -Avanos recommended above 50, but he had hematoma, so will not proceed.    Chronic Opioid Use  - UDS from October and September did not show opioids but he states he was decreasing use at that time.  -drug screen 05/2023 which was appropriate.  -UDS 6/2024 showed MJ.  UDS in 09/2024 was appropriate.    Cirrhosis 2/2 past EtOH. He states he is not drinking anymore.  -following with hepatology  -possible liver transplant?  -working with psychiatry for EtOH.    Kidney disease  -GFR 54    - RTC 4/10/25 for synvisc and follow up  -  Counseled patient regarding the importance of  activity modification.    The above plan and management options were discussed at length with patient. Patient is in agreement with the above and verbalized understanding. It will be communicated with the referring physician via electronic record, fax, or mail.  Lab/study reports reviewed were important and necessary because subsequent medical and treatment recommendations required review of the above lab/study reports. Images viewed/reviewed above were important and necessary because subsequent medical and treatment recommendations required review of the reviewed image(s).     Electronically signed by:  Robbin De La Garza DO  01/02/2025    =========================================================================================================    SUBJECTIVE:    Interval History 1/2/2025:   Irvin Diaz Jr. is a 49 y.o. male presents to the clinic for follow up.  Since last visit the pain has is unchanged. Hip pain and knee pain much better after injection until he had a fall just before valerie.  Right hip is still doing okay but the left knee has been acting up more.      The pain is located in the left hip area and radiates to the left knee and ankle .  The pain is described as aching, shooting, and stabbing    At BEST  8/10   At WORST  10/10 on the WORST day.    On average pain is rated as 8/10.   Today the pain is rated as 9/10  Symptoms interfere with daily activity and sleeping.   Exacerbating factors: Sitting, Standing, Bending, and Flexing.    Mitigating factors medications.     Current pain medications: Tramadol 50mg QID  Failed Pain Medications: oxycodone, tramadol, gabapentin, cannot take NSAIDs due to gastric bleeding, cannot take tylenol due to liver failure.     Pain procedures:  6/24/22 - left hip block - no relief. C/b hematoma formation  10/10/23 - Right hip injection - oral sed - no AC - >50% @ 6m  6/20/24 - L Knee - 70%x1m  10/8/24 -  Right hip injection - no sed - no AC - >50%@3m  10/10/24 - L knee synvisc-1 inj. - >50% x2.5m    Interval History 9/26/2024:     Irvin Diaz Jr. is a 49 y.o. male presents to the clinic for follow up.  Since last visit the pain has is unchanged.    The pain is located in the left hip area and radiates to the left knee and ankles .  The pain is described as aching, shooting, and stabbing    At BEST  8/10   At WORST  10/10 on the WORST day.    On average pain is rated as 8/10.   Today the pain is rated as 8/10  Symptoms interfere with daily activity and sleeping.   Exacerbating factors: Sitting, Standing, Bending, and Flexing.    Mitigating factors nothing.     Interval History 6/20/2024:    is a 48 y.o. male presents to the clinic for follow up.  Since last visit the pain has is unchanged.    The pain is located in the left Hip area and radiates to the left knee and ankles.  The pain is described as aching, shooting, and stabbing    At BEST  8/10   At WORST  10/10 on the WORST day.    On average pain is rated as 8/10.   Today the pain is rated as 8/10  Symptoms interfere with daily activity and sleeping.   Exacerbating factors: Sitting, Standing, Bending, and Flexing.    Mitigating factors nothing.     Interval History 3/18/2024:   Irvin Diaz Jr. is a 49 y.o. male presents to the clinic for follow up.  Since last visit the pain has has worsened. He has been having to travel to [place] a lot recently due to his son-in-law getting in to a MVA and helping out. His right hip has been bothering him more recently    The pain is located in the groin area and radiates to the right hip, left knee and ankles .  The pain is described as aching    At BEST  8/10   At WORST  10/10 on the WORST day.    On average pain is rated as 8/10.   Today the pain is rated as 8/10  Symptoms interfere with daily activity and sleeping.   Exacerbating factors: Sitting, Standing, Bending, and Flexing.    Mitigating factors  medications.     Interval History 9/13/2023:   Irvin Diaz Jr. is a 49 y.o. male presents to the clinic for follow up.  Since last visit the pain has has worsened.    The pain is located in the groin, left knee, and both ankles area and radiates to the knee and ankle .  The pain is described as aching    At BEST  8/10   At WORST  10/10 on the WORST day.    On average pain is rated as 8/10.   Today the pain is rated as 8/10  Symptoms interfere with daily activity and sleeping.   Exacerbating factors: Sitting, Standing, Bending, Walking, and Flexing.    Mitigating factors medications.     Interval History 6/21/2023:   Irvin Diaz Jr. is a 49 y.o. male presents to the clinic for follow up.  Since last visit the pain has has improved in some ways and worsened in others. The right hip has been bothering him more now. States that his right leg is clicking.     The pain is located in the left hip area and radiates to the right hip and down to his left knee .  The pain is described as aching and throbbing    At BEST  7/10   At WORST  9/10 on the WORST day.    On average pain is rated as 8/10.   Today the pain is rated as 8/10  Symptoms interfere with daily activity.   Exacerbating factors: Bending.    Mitigating factors medications (tramadol not oxycodone).     Interval History 1/25/2023:     Irvin Diaz Jr. is a 49 y.o. male presents to the clinic for follow up.  Since last visit the pain has has moderately improved. Reports that he went on a cruise and noticed his pain was doing better.  He has stopped using the cane because it has been better.  He went back to tramadol because he did not think he required the oxycodone. Pain 8/10 today. Feels like a tolerable 8.    Interval History 10/31/2022:     Irvin Diaz Jr. is a 49 y.o. male presents to the clinic for follow up.  Since last visit the pain has has significantly worsened.    The pain is located in the L hip area and radiates to the L foot  .  The pain is described as aching, dull, shooting, stabbing, tight band, and tingling    At BEST  8/10   At WORST  10/10 on the WORST day.    On average pain is rated as 7/10.   Today the pain is rated as 8/10  Symptoms interfere with daily activity, sleeping, and work.   Exacerbating factors: Standing, Bending, Coughing/Sneezing, Walking, and Getting out of bed/chair.    Mitigating factors nothing and medications.     Interval History 9/7/2022:     Irvin Diaz Jr. is a 49 y.o. male presents to the clinic for follow up.  Since last visit the pain has has worsened. He is now using a walker full time for the last 2 weeks. In a wheelchair today. He is unable to describe if his limited mobility is due to just pain or maybe a component of weakness.     The pain is located in the L hip area and radiates to the L foot .  The pain is described as aching, shooting, and stabbing    At BEST  7/10   At WORST  10/10 on the WORST day.    On average pain is rated as 8/10.   Today the pain is rated as 10/10  Symptoms interfere with daily activity, sleeping, and work.   Exacerbating factors: Standing, Laying, Bending, Walking, Lifting, and Getting out of bed/chair.    Mitigating factors none.     Interval History 8/2/2022:     Irvin Diaz Jr. is a 49 y.o. male presents to the clinic for follow up.  Since last visit the pain has is unchanged.  He stopped the Oxycodone because it gave severe, lucid dreams.  Ran out of tramadol and has not been taking anything for his pain.    The pain is located in the left hip area and radiates to the left knee/ groin .  The pain is described as aching, shooting and stabbing    At BEST  8/10   At WORST  10/10 on the WORST day.    On average pain is rated as 8/10.   Today the pain is rated as 8/10  Symptoms interfere with daily activity, sleeping and work.   Exacerbating factors: Standing, Walking and Getting out of bed/chair.    Mitigating factors nothing, heat, ice, medications and  rest.     Interval History 7/5/2022:     Irvin Diaz Jr. is a 49 y.o. male presents to the clinic for follow up.  Since last visit the pain has is unchanged.  He is s/p hip block that was complicated by hematoma in the pectineus muscle.  His Hgb has dropped, although the hematoma is resolving.  Recommended that patient go to ED as recommended by transplant.    The pain is located in the left hip area and radiates to the left leg/ groin  .  The pain is described as aching, shooting and stabbing    At BEST  8/10   At WORST  10/10 on the WORST day.    On average pain is rated as 8/10.   Today the pain is rated as 8/10  Symptoms interfere with daily activity, sleeping and work.   Exacerbating factors: Standing, Walking and Getting out of bed/chair.    Mitigating factors nothing, heat, ice, medications and rest.     Interval History 5/30/2022:     Irvin Diaz Jr. is a 49 y.o. male presents to the clinic for follow up.  Since last visit the pain has is unchanged. He missed his original procedure date due to not checking his voicemail and transportation issues. He is still interested in the procedure.  WE had a long talk about bleeding risk with his low platelets and high INR.    The pain is located in the left hip area and radiates to the left leg/groin .  The pain is described as aching, shooting and stabbing    At BEST  8/10   At WORST  10/10 on the WORST day.    On average pain is rated as 8/10.   Today the pain is rated as 8/10  Symptoms interfere with daily activity, sleeping and work.   Exacerbating factors: walking.    Mitigating factors nothing.     Initial Hx:  Irvin Diaz Jr. is a 49 y.o. male presents to the clinic for the evaluation of left hip pain. The pain started 3 years ago following fall and symptoms have been worsening. The patient states used to get hip injections of the left hip.  He is unable to walk without a cane.  He states that he was told after imaging that he had a fracture  in 2019.  He had an injection (and aspiration) in December/january and it helps the pain for about 3 weeks.  After the injections he still needs the pain but it helps.  The ortho physician at [facility]    CT note about the hip:  There is collapse of the left femoral head superior portion with bone-on-bone as well as underlying femoral sclerotic changes suggesting AVN with severe secondary degenerative changes of the acetabulum and the left hip effusion stable since prior exam.     The patient says that he has seen a ortho surgeon in the past and that they are unable to do surgery due to his liver failure.    Pain Description:    The pain is located in the left hip area and radiates to the left leg/ groin area .    At BEST  8/10   At WORST  10/10 on the WORST day.    On average pain is rated as 8/10.   Today the pain is rated as 8/10  The pain is continuous.  The pain is described as aching, shooting and throbbing    Symptoms interfere with daily activity, sleeping and work.   Exacerbating factors: Standing, Laying, Bending, Walking, Night Time, Morning, Flexing, Lifting and Getting out of bed/chair.    Mitigating factors laying down and medications.   He reports 5 hours of sleep per night.    Physical Therapy/Home Exercise: No, not currently in physical therapy or home exercise program    Current Pain Medications:    - none    Failed Pain Medications:    - cannot take NSAIDs due to gastric bleeding, cannot take tylenol due to liver failure.     Pain Treatment Therapies:    Pain procedures: hip injections  Physical Therapy: physical therapy made things worse  Chiropractor: none  Acupuncture: none  TENS unit: none  Spinal decompression: none  Joint replacement: none    Patient denies urinary incontinence, bowel incontinence and loss of sensations. (+) weakness in the left leg  Patient denies any suicidal or homicidal ideations     report:  Reviewed and consistent with medication use as  prescribed.    Imaging:   XR abdomen 03/2022:  Please note the hemidiaphragms are not included in their entirety.  Multiple surgical clips and presumed anastomotic suture line project over the right abdomen.  There are a few mildly prominent loops of gas-filled small bowel loops present measuring up to 3.3 cm in the left abdomen.  Limited evaluation of free intraperitoneal air to patient positioning/technique.  Calcifications project over the pelvis, likely phleboliths.  Osseous structures demonstrate significant degenerative change of the left hip with chronic appearing deformity/collapse of the left femoral head.    CT abdomen 03/2022:  Mild circumferential wall thickening involving the proximal colon extending from the ileocolic anastomosis at the hepatic flexure through around the level of the splenic flexure suggests inflammatory or infectious colitis.  No convincing transmural inflammation is seen.     Postoperative changes of proximal colectomy and scattered mild diverticulosis of the more distal colon.  No convincing diverticulitis, bowel obstruction, free air or abscess.     Findings of cirrhosis and mild abdominal ascites as described essentially stable.     Left hip findings suggesting AVN as described.     This report was flagged in Epic as abnormal.     No other significant or acute findings.       Past Medical History:   Diagnosis Date    Alcohol withdrawal 5/1/2022    Alcoholic cirrhosis of liver     Alcoholic ketoacidosis 6/6/2021    Arthritis     ATN (acute tubular necrosis)     CHF (congestive heart failure)     Diverticulitis 01/2020    Esophageal varices     GERD (gastroesophageal reflux disease)     GI bleed     Hip arthritis     Left    Hypertension     Liver cirrhosis     Macrocytic anemia     Pulmonary embolism 08/2018    Unprovoked DVT.  Stop Coumadin due to GIB    Thrombocytopenia      Past Surgical History:   Procedure Laterality Date    ANKLE SURGERY      BLOCK, NERVE, PERIPHERAL Left  06/24/2022    Procedure: Left Hip femoral-obturator accessory nerve block;  Surgeon: Robbin De La Garza DO;  Location[facility] OR;  Service: Pain Management;  Laterality: Left;    COLON SURGERY  2007    COLONOSCOPY  09/05/2019    [facility]    COLONOSCOPY N/A 02/17/2021    Procedure: COLONOSCOPY;  Surgeon: Renea Billingsley MD;  Location: [facility];  Service: Endoscopy;  Laterality: N/A;    COLONOSCOPY N/A 2/13/2023    Procedure: COLONOSCOPY;  Surgeon: Rudy Orellana MD;  [facility]  Service: Endoscopy;  Laterality: N/A;  cirrhosis-labs done on 1/11/23  instr portal-GT  No answer for precall- [place]    COLONOSCOPY N/A 3/10/2023    Procedure: COLONOSCOPY;  Surgeon: Rudy Orellana MD;  [facility]  Service: Endoscopy;  Laterality: N/A;  inst via poral per pt request    COLONOSCOPY W/ BIOPSIES  02/17/2021    ESOPHAGOGASTRODUODENOSCOPY N/A 07/26/2019    Procedure: EGD (ESOPHAGOGASTRODUODENOSCOPY);  Surgeon: Brian Trivedi MD;  Location: [facility];  Service: Endoscopy;  Laterality: N/A;    ESOPHAGOGASTRODUODENOSCOPY N/A 04/08/2020    Procedure: EGD (ESOPHAGOGASTRODUODENOSCOPY);  Surgeon: Donn Acuna MD;  Location: [facility];  Service: Endoscopy;  Laterality: N/A;    ESOPHAGOGASTRODUODENOSCOPY N/A 01/03/2021    Procedure: EGD (ESOPHAGOGASTRODUODENOSCOPY)- coffee ground emesis, hx varices;  Surgeon: Steve Chairez MD;  Location: [facility];  Service: Endoscopy;  Laterality: N/A;    ESOPHAGOGASTRODUODENOSCOPY N/A 05/03/2021    Procedure: EGD (ESOPHAGOGASTRODUODENOSCOPY);  Surgeon: Jeanmarie Ngo MD;  Location: [facility];  Service: Endoscopy;  Laterality: N/A;    ESOPHAGOGASTRODUODENOSCOPY N/A 07/19/2021    Procedure: ESOPHAGOGASTRODUODENOSCOPY (EGD);  Surgeon: Claudio Medeiros MD;  Location: [facility];  Service: Endoscopy;  Laterality: N/A;    ESOPHAGOGASTRODUODENOSCOPY  12/20/2021    ESOPHAGOGASTRODUODENOSCOPY N/A 12/20/2021    Procedure: EGD (ESOPHAGOGASTRODUODENOSCOPY);  Surgeon:  Ajay Jackson MD;  Location: [facility];  Service: Endoscopy;  Laterality: N/A;    ESOPHAGOGASTRODUODENOSCOPY N/A 2022    Procedure: EGD (ESOPHAGOGASTRODUODENOSCOPY);  Surgeon: Brian Trivedi MD;  [facility]: [facility];  Service: Endoscopy;  Laterality: N/A;    ESOPHAGOGASTRODUODENOSCOPY N/A 2022    Procedure: EGD (ESOPHAGOGASTRODUODENOSCOPY);  Surgeon: Ajay Jackson MD;  Location: [facility];  Service: Endoscopy;  Laterality: N/A;    ESOPHAGOGASTRODUODENOSCOPY N/A 2022    Procedure: EGD (ESOPHAGOGASTRODUODENOSCOPY);  Surgeon: Edouard Maynard MD;  Location: [address]);  Service: Endoscopy;  Laterality: N/A;    ESOPHAGOGASTRODUODENOSCOPY N/A 2023    Procedure: EGD (ESOPHAGOGASTRODUODENOSCOPY);  Surgeon: Caesar Dickens MD;  Location: [facility];  Service: Endoscopy;  Laterality: N/A;    ESOPHAGOGASTRODUODENOSCOPY N/A 3/28/2024    Procedure: EGD (ESOPHAGOGASTRODUODENOSCOPY);  Surgeon: Jeanmarie Ngo MD;  Location: [facility];  Service: Endoscopy;  Laterality: N/A;    HERNIA REPAIR Left     Inguinal    INJECTION OF JOINT Right 2023    Procedure: RT Hip Injection;  Surgeon: Robbin De La Garza DO;  [facility];  Service: Pain Management;  Laterality: Right;  oral - 20 mins    INJECTION OF JOINT Right 10/8/2024    Procedure: Right hip injection;  Surgeon: Robbin De La Garza DO;  Location: [facility];  Service: Pain Management;  Laterality: Right;  No sed no AC    UPPER GASTROINTESTINAL ENDOSCOPY N/A 2022     Social History     Socioeconomic History    Marital status:    Tobacco Use    Smoking status: Former     Current packs/day: 0.00     Types: Cigarettes     Quit date: 2000     Years since quittin.0    Smokeless tobacco: Never    Tobacco comments:     quit 20 years ago   Substance and Sexual Activity    Alcohol use: Not Currently     Comment: Quit drinking 21    Drug use: Not Currently    Sexual activity: Yes      Comment: occ     Social Drivers of Health     Financial Resource Strain: Low Risk  (4/22/2024)    Overall Financial Resource Strain (CARDIA)     Difficulty of Paying Living Expenses: Not hard at all   Food Insecurity: No Food Insecurity (4/22/2024)    Hunger Vital Sign     Worried About Running Out of Food in the Last Year: Never true     Ran Out of Food in the Last Year: Never true   Transportation Needs: No Transportation Needs (4/22/2024)    PRAPARE - Transportation     Lack of Transportation (Medical): No     Lack of Transportation (Non-Medical): No   Physical Activity: Inactive (4/22/2024)    Exercise Vital Sign     Days of Exercise per Week: 0 days     Minutes of Exercise per Session: 0 min   Stress: No Stress Concern Present (4/22/2024)    [employer] - Occupational Stress Questionnaire     Feeling of Stress : Not at all   Housing Stability: Unknown (4/22/2024)    Housing Stability Vital Sign     Unable to Pay for Housing in the Last Year: No     Family History   Problem Relation Name Age of Onset    Hypertension Mother      Breast cancer Mother      Hypertension Father      Prostate cancer Father      Bladder Cancer Father         Review of patient's allergies indicates:   Allergen Reactions    Ciprofloxacin hcl Hallucinations    Meperidine Hives     Pt medicated w/multiple medications (demerol, protonix, and zofran)  w/i 10 mins time frame prior to developing localized hives near IV site that med was administered. Pt reports he has/takes all other medications on daily basis.     Morphine Itching    Nsaids (non-steroidal anti-inflammatory drug)      Kidney Disease    Tylenol [acetaminophen]      Hx of liver disease       Current Outpatient Medications   Medication Sig    ferrous gluconate (FERATE) 240 (27 FE) MG tablet Take 2 tablets (480 mg total) by mouth 2 (two) times daily with meals.    folic acid (FOLVITE) 1 MG tablet Take 1 tablet (1 mg total) by mouth once daily.     metoprolol tartrate (LOPRESSOR) 50 MG tablet Take 1 tablet (50 mg total) by mouth 2 (two) times daily. Do not take if heart rate is less than 60    nortriptyline (PAMELOR) 25 MG capsule Take 1 capsule (25 mg total) by mouth every evening.    papaverine 30 mg/mL injection Add phentolamine 10 mgs/ml., add PGE1  100 micrograms.    rifAXIMin (XIFAXAN) 550 mg Tab Take 1 tablet (550 mg total) by mouth 2 (two) times daily.    spironolactone (ALDACTONE) 25 MG tablet Take 1 tablet (25 mg total) by mouth once daily.    tadalafiL (CIALIS) 20 MG Tab Take 1 tablet (20 mg total) by mouth daily as needed (erectile dysfunction).    thiamine 100 MG tablet Take 1 tablet (100 mg total) by mouth once daily.    traMADoL (ULTRAM) 50 mg tablet Take 1 tablet (50 mg total) by mouth 4 (four) times daily as needed for Pain.     No current facility-administered medications for this visit.       REVIEW OF SYSTEMS:    GENERAL:  No weight loss, malaise or fevers.   HEENT:   No recent changes in vision or hearing   NECK:  Negative for lumps, no difficulty with swallowing.  RESPIRATORY:  Negative for cough, wheezing or shortness of breath, patient denies any recent URI.  CARDIOVASCULAR:  Negative for chest pain, leg swelling or palpitations.  GI:  Negative for abdominal discomfort, blood in stools or black stools or change in bowel habits.  MUSCULOSKELETAL:  See HPI.  SKIN:  Negative for lesions, rash, and itching. + skin itching  PSYCH:  No mood disorder or recent psychosocial stressors.  Patients sleep is not disturbed secondary to pain.  HEMATOLOGY/LYMPHOLOGY:  Negative for prolonged bleeding, bruising easily or swollen nodes.  Patient is not currently taking any anti-coagulants  NEURO:   No history of headaches, syncope, paralysis, seizures or tremors.  All other reviewed and negative other than HPI.    OBJECTIVE:    BP (!) 150/90 (BP Location: Left arm, Patient Position: Sitting)   Pulse 99   Ht 6' (1.829 m)   Wt 112.2 kg (247 lb 5.7 oz)    BMI 33.55 kg/m²     PHYSICAL EXAMINATION:    GENERAL: Fraile, in no acute distress, alert and oriented x3.  PSYCH:  Mood and affect appropriate.  SKIN: Skin color, texture, turgor normal, no rashes or lesions on visible skin.  HEAD/FACE:  Normocephalic, atraumatic. Cranial nerves grossly intact.  CV: RRR with palpation of the radial artery.  PULM: CTAB. No evidence of respiratory difficulty, symmetric chest rise.  GI:  Soft    MUSKULOSKELETAL:    EXTREMITIES:   Left Hip Exam (limited ROM and exam 2/2/ pain)  - Log Roll Did not perform  - FADIR Did not perform  - Stinchfield Did not perform  - Hip Scour Did not perform  - GTB Tenderness Positive   -TTP over anterior hip joint. Posterior not palpated  - TTP of the superior knee joint.    Right hip exam:  (-) log roll  (+) fadir  (+) TTP of the right groin  (+) Right leg 2+ pitting edema    Left knee:  TTP right medial knee  (-) edema  (-) crepitus  (+) pain with end ROM    MUSCULOSKELETAL:  Atrophy of the left leg compared to the right  No deformities, edema, or skin discoloration are noted on visible skin. Good capillary refill.     NEURO: Bilateral upper and lower extremity coordination and muscle stretch reflexes are physiologic and symmetric.      NEUROLOGICAL EXAM:  MENTAL STATUS: A x O x 3, good concentration, speech is fluent and goal directed  MEMORY: recent and remote are intact  CN: CN2-12 grossly intact  MOTOR: 5/5 in all muscle groups on the right. HF 3/5 on the left, 4/5 KE, 4/5 KF with pain  DTRs: 3+ intact symmetric patella and 2 + achilles  Sensation:    -yes Loss of sensation in a left lower L-1 and L-2 on the left distribution.  Babinski: absent     Gait: Cane, abnormal. Short stride. Favors left leg

## 2025-01-03 ENCOUNTER — TELEPHONE (OUTPATIENT)
Dept: PRIMARY CARE CLINIC | Facility: CLINIC | Age: 50
End: 2025-01-03
Payer: MEDICARE

## 2025-01-03 DIAGNOSIS — I10 BENIGN ESSENTIAL HTN: Primary | ICD-10-CM

## 2025-01-03 RX ORDER — VALSARTAN 40 MG/1
40 TABLET ORAL DAILY
Qty: 90 TABLET | Refills: 1 | Status: SHIPPED | OUTPATIENT
Start: 2025-01-03

## 2025-01-03 NOTE — TELEPHONE ENCOUNTER
----- Message from April sent at 1/3/2025  8:56 AM CST -----  Contact: Mobile  [phone]  Patient would like for you to increase the dosage of his spironolactone (ALDACTONE) 25 MG tablet.    Patient also said that he went to see a doctor on yesterday January second, and he was told that his pressure was high. Patient would like for you to write a script for his pressure and sen it to..    [facility] [address] [address]  Phone: [phone] Fax: [phone]

## 2025-01-07 ENCOUNTER — PATIENT MESSAGE (OUTPATIENT)
Dept: TRANSPLANT | Facility: CLINIC | Age: 50
End: 2025-01-07
Payer: MEDICARE

## 2025-01-27 ENCOUNTER — TELEPHONE (OUTPATIENT)
Dept: PRIMARY CARE CLINIC | Facility: CLINIC | Age: 50
End: 2025-01-27
Payer: MEDICARE

## 2025-01-27 NOTE — TELEPHONE ENCOUNTER
----- Message from Elda sent at 1/27/2025  9:03 AM CST -----  Regarding: Nurse  Contact: Pt  Type: Requesting to speak with nurse    Who Called: PT  Regarding: Foot Pain, Neck Pain, Blood in urine    Would the patient rather a call back or a response via [facility]? Call back  Best Call Back Number:  [phone]  Additional Information: Per Symptoms Screening , Please call, Thank You

## 2025-01-29 ENCOUNTER — OFFICE VISIT (OUTPATIENT)
Dept: PRIMARY CARE CLINIC | Facility: CLINIC | Age: 50
End: 2025-01-29
Payer: MEDICARE

## 2025-01-29 VITALS
HEART RATE: 97 BPM | DIASTOLIC BLOOD PRESSURE: 62 MMHG | BODY MASS INDEX: 32.55 KG/M2 | WEIGHT: 240 LBS | SYSTOLIC BLOOD PRESSURE: 162 MMHG | OXYGEN SATURATION: 97 %

## 2025-01-29 DIAGNOSIS — I10 BENIGN ESSENTIAL HTN: Primary | ICD-10-CM

## 2025-01-29 DIAGNOSIS — M62.838 MUSCLE SPASMS OF NECK: ICD-10-CM

## 2025-01-29 DIAGNOSIS — F11.20 OPIOID DEPENDENCE, UNCOMPLICATED: ICD-10-CM

## 2025-01-29 DIAGNOSIS — D69.6 THROMBOCYTOPENIA: ICD-10-CM

## 2025-01-29 DIAGNOSIS — L03.116 CELLULITIS OF LEFT LOWER EXTREMITY: ICD-10-CM

## 2025-01-29 DIAGNOSIS — R79.1 ELEVATED CLOTTING TIME: ICD-10-CM

## 2025-01-29 DIAGNOSIS — D50.0 IRON DEFICIENCY ANEMIA DUE TO CHRONIC BLOOD LOSS: ICD-10-CM

## 2025-01-29 DIAGNOSIS — R60.9 EDEMA, UNSPECIFIED TYPE: ICD-10-CM

## 2025-01-29 DIAGNOSIS — M79.89 LEFT LEG SWELLING: ICD-10-CM

## 2025-01-29 DIAGNOSIS — I50.9 CONGESTIVE HEART FAILURE, UNSPECIFIED HF CHRONICITY, UNSPECIFIED HEART FAILURE TYPE: ICD-10-CM

## 2025-01-29 DIAGNOSIS — R31.0 GROSS HEMATURIA: ICD-10-CM

## 2025-01-29 DIAGNOSIS — N18.32 STAGE 3B CHRONIC KIDNEY DISEASE: ICD-10-CM

## 2025-01-29 DIAGNOSIS — K70.30 ALCOHOLIC CIRRHOSIS OF LIVER WITHOUT ASCITES: ICD-10-CM

## 2025-01-29 PROCEDURE — 99999 PR PBB SHADOW E&M-EST. PATIENT-LVL V: CPT | Mod: PBBFAC,,,

## 2025-01-29 PROCEDURE — 99215 OFFICE O/P EST HI 40 MIN: CPT | Mod: PBBFAC,PN

## 2025-01-29 RX ORDER — METHOCARBAMOL 500 MG/1
500 TABLET, FILM COATED ORAL 4 TIMES DAILY
Qty: 40 TABLET | Refills: 0 | Status: SHIPPED | OUTPATIENT
Start: 2025-01-29 | End: 2025-01-30

## 2025-01-29 RX ORDER — CEPHALEXIN 500 MG/1
500 CAPSULE ORAL EVERY 6 HOURS
Qty: 20 CAPSULE | Refills: 0 | Status: SHIPPED | OUTPATIENT
Start: 2025-01-29 | End: 2025-01-30

## 2025-01-29 RX ORDER — VALSARTAN 40 MG/1
40 TABLET ORAL DAILY
Qty: 90 TABLET | Refills: 1 | Status: SHIPPED | OUTPATIENT
Start: 2025-01-29 | End: 2025-01-30

## 2025-01-29 RX ORDER — SPIRONOLACTONE 25 MG/1
25 TABLET ORAL DAILY
Qty: 90 TABLET | Refills: 3 | Status: SHIPPED | OUTPATIENT
Start: 2025-01-29 | End: 2025-01-30

## 2025-01-29 RX ORDER — FOLIC ACID 1 MG/1
1 TABLET ORAL DAILY
Qty: 30 TABLET | Refills: 5 | Status: SHIPPED | OUTPATIENT
Start: 2025-01-29 | End: 2025-01-30

## 2025-01-29 RX ORDER — QUINIDINE GLUCONATE 324 MG
480 TABLET, EXTENDED RELEASE ORAL 2 TIMES DAILY WITH MEALS
Qty: 120 TABLET | Refills: 3 | Status: SHIPPED | OUTPATIENT
Start: 2025-01-29 | End: 2025-01-30

## 2025-01-29 RX ORDER — DAPAGLIFLOZIN 10 MG/1
10 TABLET, FILM COATED ORAL DAILY
Qty: 90 TABLET | Refills: 3 | Status: SHIPPED | OUTPATIENT
Start: 2025-01-29 | End: 2025-01-30

## 2025-01-29 RX ORDER — LANOLIN ALCOHOL/MO/W.PET/CERES
100 CREAM (GRAM) TOPICAL DAILY
Qty: 30 TABLET | Refills: 5 | Status: SHIPPED | OUTPATIENT
Start: 2025-01-29 | End: 2025-01-30

## 2025-01-29 RX ORDER — METOPROLOL TARTRATE 50 MG/1
50 TABLET ORAL 2 TIMES DAILY
Qty: 60 TABLET | Refills: 11 | Status: SHIPPED | OUTPATIENT
Start: 2025-01-29 | End: 2025-01-30

## 2025-01-29 NOTE — PROGRESS NOTES
"Clinic Note  1/29/2025      Subjective:       Patient ID:  Irvin is a 49 y.o. male being seen for an established visit.    Chief Complaint: No chief complaint on file.    Reports to clinic today accompanied by his father.      Patient has an extensive medical history including but not limited to chronic kidney disease, high blood pressure, liver cirrhosis, congestive heart failure, gross hematuria.    Patient presents to clinic today for refills and gross hematuria. Patient states he has not been taking his medication because he is unsure of which medications to take.  Medication list discussed in depth with patient.  Refills sent to pharmacy and updated medication list provided to the patient. Patient also reports gross hematuria but deemed "least important" at visit today. Symptoms of hematuria is not a new issue. Patient has followed urology in the past.  Patient has no showed to urology appointments in the past.  Urinalysis with reflex to culture ordered today.  Will help the patient to get a follow-up appointment with Urology.  Patient has neglected to follow up with Nephrology, Transplant specialists. and Cardiology as well.  Advised patient to follow up with all specialists including liver transplant doctor as well.     Patient's blood pressure is elevated today.  Patient is asymptomatic.  Has not taken his blood pressure medications recently. Denies a/e to medication.     Reports neck stiffness starting 3-4 days , denies injury, reports he believes its from sleeping in a difficult position. Pt reports symptoms are worsen when lateral twisting of neck. Patient denies numbness and tingling into extremity. States he takes tramadol for other pain symptoms but is not effective for neck stiffness    Foot and leg swelling- photo taken at visit today, pt states he woke up with swelling in foot 2 days ago, denies injury to foot, SOB, fevers, calf pain, chest pain, or palpitations. He reports it is painful to walk " "and put on socks. He states "I never seen it swell like this before". Advised pt will do a STAT ultrasound to r/o DVT and order antibiotic for possible cellulitis. Also informed pt that the swelling can be contributory to liver disease, kidney disease, and heart failure as well. Pt verbalized understanding.     Patient denies any other concerns today         Review of Systems   Constitutional:  Negative for appetite change, fatigue, fever and unexpected weight change.   HENT:  Negative for hearing loss and sore throat.    Eyes:  Negative for pain and visual disturbance.   Respiratory:  Negative for cough, shortness of breath and wheezing.    Cardiovascular:  Positive for leg swelling (bilateral and foot swelling). Negative for chest pain and palpitations.   Gastrointestinal:  Negative for abdominal pain, blood in stool, constipation, diarrhea, nausea and vomiting.   Genitourinary:  Positive for hematuria (not a new concern). Negative for dysuria.   Musculoskeletal:  Positive for myalgias and neck stiffness. Negative for arthralgias.   Neurological:  Negative for dizziness and headaches.   Psychiatric/Behavioral:  Negative for suicidal ideas.         Medication List with Changes/Refills   New Medications    CEPHALEXIN (KEFLEX) 500 MG CAPSULE    Take 1 capsule (500 mg total) by mouth every 6 (six) hours. for 5 days    FARXIGA 10 MG TABLET    Take 1 tablet (10 mg total) by mouth once daily.    METHOCARBAMOL (ROBAXIN) 500 MG TAB    Take 1 tablet (500 mg total) by mouth 4 (four) times daily. for 10 days   Current Medications    NORTRIPTYLINE (PAMELOR) 25 MG CAPSULE    Take 1 capsule (25 mg total) by mouth every evening.    PAPAVERINE 30 MG/ML INJECTION    Add phentolamine 10 mgs/ml., add PGE1  100 micrograms.    RIFAXIMIN (XIFAXAN) 550 MG TAB    Take 1 tablet (550 mg total) by mouth 2 (two) times daily.    TADALAFIL (CIALIS) 20 MG TAB    Take 1 tablet (20 mg total) by mouth daily as needed (erectile dysfunction).    " TRAMADOL (ULTRAM) 50 MG TABLET    Take 1 tablet (50 mg total) by mouth 4 (four) times daily as needed for Pain.   Changed and/or Refilled Medications    Modified Medication Previous Medication    FERROUS GLUCONATE (FERATE) 240 (27 FE) MG TABLET ferrous gluconate (FERATE) 240 (27 FE) MG tablet       Take 2 tablets (480 mg total) by mouth 2 (two) times daily with meals.    Take 2 tablets (480 mg total) by mouth 2 (two) times daily with meals.    FOLIC ACID (FOLVITE) 1 MG TABLET folic acid (FOLVITE) 1 MG tablet       Take 1 tablet (1 mg total) by mouth once daily.    Take 1 tablet (1 mg total) by mouth once daily.    METOPROLOL TARTRATE (LOPRESSOR) 50 MG TABLET metoprolol tartrate (LOPRESSOR) 50 MG tablet       Take 1 tablet (50 mg total) by mouth 2 (two) times daily. Do not take if heart rate is less than 60    Take 1 tablet (50 mg total) by mouth 2 (two) times daily. Do not take if heart rate is less than 60    SPIRONOLACTONE (ALDACTONE) 25 MG TABLET spironolactone (ALDACTONE) 25 MG tablet       Take 1 tablet (25 mg total) by mouth once daily.    Take 1 tablet (25 mg total) by mouth once daily.    THIAMINE 100 MG TABLET thiamine 100 MG tablet       Take 1 tablet (100 mg total) by mouth once daily.    Take 1 tablet (100 mg total) by mouth once daily.    VALSARTAN (DIOVAN) 40 MG TABLET valsartan (DIOVAN) 40 MG tablet       Take 1 tablet (40 mg total) by mouth once daily.    Take 1 tablet (40 mg total) by mouth once daily.       Patient Active Problem List   Diagnosis    Coagulopathy    Thrombocytopenia    History of pulmonary embolus (PE)    AVN (avascular necrosis of bone)    Hydronephrosis of right kidney    Transaminitis    Elevated lipase    Essential hypertension    Alcoholic cirrhosis of liver    CKD (chronic kidney disease) stage 3, GFR 30-59 ml/min    Hip arthritis    Arm pain    History of GI bleed    Colitis    Alcohol abuse    Ascites due to alcoholic cirrhosis    Diastolic dysfunction    Jaundice     Hyperbilirubinemia    Dilation of common bile duct    Chronic left hip pain    Alcohol use disorder, severe, dependence    Encounter for pre-transplant evaluation for liver transplant    Hepatic encephalopathy    Long-term use of high-risk medication    Melena    Secondary esophageal varices without bleeding    Anemia in chronic kidney disease (CKD)    HTN (hypertension)    Ventricular tachycardia    Marijuana smoker    Opioid dependence, uncomplicated    Congestive heart failure, unspecified HF chronicity, unspecified heart failure type           Objective:      BP (!) 162/62 (BP Location: Right arm, Patient Position: Sitting)   Pulse 97   Wt 108.9 kg (240 lb)   SpO2 97%   BMI 32.55 kg/m²   Estimated body mass index is 32.55 kg/m² as calculated from the following:    Height as of 1/2/25: 6' (1.829 m).    Weight as of this encounter: 108.9 kg (240 lb).  Physical Exam  Vitals and nursing note reviewed.   Constitutional:       General: He is not in acute distress.  HENT:      Head: Atraumatic.   Cardiovascular:      Rate and Rhythm: Normal rate and regular rhythm.      Pulses:           Dorsalis pedis pulses are 2+ on the left side.      Heart sounds: Murmur heard.   Pulmonary:      Effort: Pulmonary effort is normal. No respiratory distress.      Breath sounds: Normal breath sounds. No wheezing, rhonchi or rales.   Musculoskeletal:      Right lower leg: Edema present.      Left lower leg: Edema present.      Right foot: Decreased range of motion and decreased capillary refill. Swelling present. No tenderness. Normal pulse.      Left foot: Decreased range of motion and decreased capillary refill. Swelling and tenderness present. Normal pulse.      Comments: Negative homans sign, denies calf tenderness upon palpation of LLE   Feet:      Left foot:      Skin integrity: Erythema, warmth and dry skin present. No ulcer or skin breakdown.   Neurological:      Mental Status: He is alert and oriented to person, place, and  time.      Gait: Gait normal.   Psychiatric:         Mood and Affect: Mood normal.         Thought Content: Thought content normal.             Assessment and Plan:         Problem List Items Addressed This Visit          Psychiatric    Opioid dependence, uncomplicated       Cardiac/Vascular    Congestive heart failure, unspecified HF chronicity, unspecified heart failure type       Renal/    CKD (chronic kidney disease) stage 3, GFR 30-59 ml/min (Chronic)    Relevant Medications    FARXIGA 10 mg tablet       Hematology    Thrombocytopenia (Chronic)    Relevant Medications    folic acid (FOLVITE) 1 MG tablet       GI    Alcoholic cirrhosis of liver (Chronic)    Relevant Medications    thiamine 100 MG tablet     Other Visit Diagnoses       Benign essential HTN    -  Primary    Relevant Medications    metoprolol tartrate (LOPRESSOR) 50 MG tablet    valsartan (DIOVAN) 40 MG tablet    spironolactone (ALDACTONE) 25 MG tablet    Cellulitis of left lower extremity        Relevant Medications    cephALEXin (KEFLEX) 500 MG capsule    Gross hematuria        Relevant Orders    Urinalysis, Reflex to Urine Culture Urine, Clean Catch (Completed)    Left leg swelling        Relevant Orders    US Lower Extremity Veins Left    Muscle spasms of neck        Relevant Medications    methocarbamoL (ROBAXIN) 500 MG Tab    Iron deficiency anemia due to chronic blood loss        Relevant Medications    ferrous gluconate (FERATE) 240 (27 FE) MG tablet    Elevated clotting time        Relevant Medications    folic acid (FOLVITE) 1 MG tablet    Edema, unspecified type        Relevant Orders    US Lower Extremity Veins Left            Reviewed risks, benefits, and appropriate medication use prescribed  Discussed LS changes as appropriate   Reviewed cancer screening guidelines   Advised to keep/make speciality and follow up appointments as scheduled   Reviewed ER precautions in depth  Verbalized understanding and is agreeable to plan       Follow Up:   Follow up in about 2 weeks (around 2/12/2025), or if symptoms worsen or fail to improve.    Other Orders Placed This Visit:  Orders Placed This Encounter   Procedures    US Lower Extremity Veins Left    Urinalysis, Reflex to Urine Culture Urine, Clean Catch           Michael Dudley, VINOD-BC

## 2025-01-30 ENCOUNTER — HOSPITAL ENCOUNTER (EMERGENCY)
Facility: HOSPITAL | Age: 50
Discharge: HOME | End: 2025-01-30
Attending: EMERGENCY MEDICINE
Payer: MEDICARE

## 2025-01-30 ENCOUNTER — PATIENT MESSAGE (OUTPATIENT)
Dept: PRIMARY CARE CLINIC | Facility: CLINIC | Age: 50
End: 2025-01-30
Payer: MEDICARE

## 2025-01-30 VITALS
RESPIRATION RATE: 18 BRPM | BODY MASS INDEX: 32.55 KG/M2 | SYSTOLIC BLOOD PRESSURE: 165 MMHG | TEMPERATURE: 98 F | WEIGHT: 240 LBS | HEART RATE: 88 BPM | OXYGEN SATURATION: 98 % | DIASTOLIC BLOOD PRESSURE: 86 MMHG

## 2025-01-30 DIAGNOSIS — M10.9 GOUTY ARTHRITIS OF LEFT FOOT: Primary | ICD-10-CM

## 2025-01-30 DIAGNOSIS — K74.60 CHRONIC LIVER DISEASE AND CIRRHOSIS: ICD-10-CM

## 2025-01-30 DIAGNOSIS — I89.0 LYMPHEDEMA: ICD-10-CM

## 2025-01-30 DIAGNOSIS — K76.9 CHRONIC LIVER DISEASE AND CIRRHOSIS: ICD-10-CM

## 2025-01-30 LAB
ALBUMIN SERPL BCP-MCNC: 2.7 G/DL (ref 3.5–5.2)
ALP SERPL-CCNC: 93 U/L (ref 55–135)
ALT SERPL W/O P-5'-P-CCNC: 21 U/L (ref 10–44)
ANION GAP SERPL CALC-SCNC: 4 MMOL/L (ref 8–16)
AST SERPL-CCNC: 39 U/L (ref 10–40)
BASOPHILS # BLD AUTO: 0.02 K/UL (ref 0–0.2)
BASOPHILS NFR BLD: 0.3 % (ref 0–1.9)
BILIRUB SERPL-MCNC: 3.9 MG/DL (ref 0.1–1)
BUN SERPL-MCNC: 15 MG/DL (ref 6–20)
CALCIUM SERPL-MCNC: 8.1 MG/DL (ref 8.7–10.5)
CHLORIDE SERPL-SCNC: 108 MMOL/L (ref 95–110)
CO2 SERPL-SCNC: 22 MMOL/L (ref 23–29)
CREAT SERPL-MCNC: 1.7 MG/DL (ref 0.5–1.4)
DIFFERENTIAL METHOD BLD: ABNORMAL
EOSINOPHIL # BLD AUTO: 0.2 K/UL (ref 0–0.5)
EOSINOPHIL NFR BLD: 2.2 % (ref 0–8)
ERYTHROCYTE [DISTWIDTH] IN BLOOD BY AUTOMATED COUNT: 16.1 % (ref 11.5–14.5)
EST. GFR  (NO RACE VARIABLE): 48.5 ML/MIN/1.73 M^2
GLUCOSE SERPL-MCNC: 105 MG/DL (ref 70–110)
HCT VFR BLD AUTO: 26.6 % (ref 40–54)
HGB BLD-MCNC: 8.8 G/DL (ref 14–18)
IMM GRANULOCYTES # BLD AUTO: 0.06 K/UL (ref 0–0.04)
IMM GRANULOCYTES NFR BLD AUTO: 0.8 % (ref 0–0.5)
LYMPHOCYTES # BLD AUTO: 1.7 K/UL (ref 1–4.8)
LYMPHOCYTES NFR BLD: 22.8 % (ref 18–48)
MCH RBC QN AUTO: 33.1 PG (ref 27–31)
MCHC RBC AUTO-ENTMCNC: 33.1 G/DL (ref 32–36)
MCV RBC AUTO: 100 FL (ref 82–98)
MONOCYTES # BLD AUTO: 0.9 K/UL (ref 0.3–1)
MONOCYTES NFR BLD: 12.9 % (ref 4–15)
NEUTROPHILS # BLD AUTO: 4.4 K/UL (ref 1.8–7.7)
NEUTROPHILS NFR BLD: 61 % (ref 38–73)
NRBC BLD-RTO: 0 /100 WBC
PLATELET # BLD AUTO: 82 K/UL (ref 150–450)
PMV BLD AUTO: 9.5 FL (ref 9.2–12.9)
POTASSIUM SERPL-SCNC: 3.5 MMOL/L (ref 3.5–5.1)
PROT SERPL-MCNC: 6.7 G/DL (ref 6–8.4)
RBC # BLD AUTO: 2.66 M/UL (ref 4.6–6.2)
SODIUM SERPL-SCNC: 134 MMOL/L (ref 136–145)
URATE SERPL-MCNC: 8.7 MG/DL (ref 3.4–7)
WBC # BLD AUTO: 7.27 K/UL (ref 3.9–12.7)

## 2025-01-30 PROCEDURE — 80053 COMPREHEN METABOLIC PANEL: CPT | Performed by: EMERGENCY MEDICINE

## 2025-01-30 PROCEDURE — 25000003 PHARM REV CODE 250: Performed by: EMERGENCY MEDICINE

## 2025-01-30 PROCEDURE — 84550 ASSAY OF BLOOD/URIC ACID: CPT | Performed by: EMERGENCY MEDICINE

## 2025-01-30 PROCEDURE — 99285 EMERGENCY DEPT VISIT HI MDM: CPT | Mod: 25

## 2025-01-30 PROCEDURE — 85025 COMPLETE CBC W/AUTO DIFF WBC: CPT | Performed by: EMERGENCY MEDICINE

## 2025-01-30 RX ORDER — FOLIC ACID 1 MG/1
1 TABLET ORAL DAILY
Qty: 30 TABLET | Refills: 5 | Status: SHIPPED | OUTPATIENT
Start: 2025-01-30

## 2025-01-30 RX ORDER — PREDNISONE 20 MG/1
20 TABLET ORAL DAILY
Qty: 5 TABLET | Refills: 0 | Status: SHIPPED | OUTPATIENT
Start: 2025-01-30 | End: 2025-02-04

## 2025-01-30 RX ORDER — DAPAGLIFLOZIN 10 MG/1
10 TABLET, FILM COATED ORAL DAILY
Qty: 90 TABLET | Refills: 3 | Status: SHIPPED | OUTPATIENT
Start: 2025-01-30 | End: 2026-01-25

## 2025-01-30 RX ORDER — METHOCARBAMOL 500 MG/1
500 TABLET, FILM COATED ORAL 4 TIMES DAILY
Qty: 40 TABLET | Refills: 0 | Status: SHIPPED | OUTPATIENT
Start: 2025-01-30 | End: 2025-02-09

## 2025-01-30 RX ORDER — LANOLIN ALCOHOL/MO/W.PET/CERES
100 CREAM (GRAM) TOPICAL DAILY
Qty: 30 TABLET | Refills: 5 | Status: SHIPPED | OUTPATIENT
Start: 2025-01-30

## 2025-01-30 RX ORDER — ALLOPURINOL 100 MG/1
50 TABLET ORAL DAILY
Qty: 3 TABLET | Refills: 0 | Status: SHIPPED | OUTPATIENT
Start: 2025-01-30 | End: 2025-02-07

## 2025-01-30 RX ORDER — SPIRONOLACTONE 25 MG/1
25 TABLET ORAL DAILY
Qty: 90 TABLET | Refills: 3 | Status: SHIPPED | OUTPATIENT
Start: 2025-01-30 | End: 2026-01-30

## 2025-01-30 RX ORDER — CEPHALEXIN 500 MG/1
500 CAPSULE ORAL EVERY 6 HOURS
Qty: 20 CAPSULE | Refills: 0 | Status: SHIPPED | OUTPATIENT
Start: 2025-01-30 | End: 2025-02-03

## 2025-01-30 RX ORDER — HYDROCODONE BITARTRATE AND ACETAMINOPHEN 5; 325 MG/1; MG/1
1 TABLET ORAL EVERY 6 HOURS PRN
Qty: 15 TABLET | Refills: 0 | Status: SHIPPED | OUTPATIENT
Start: 2025-01-30

## 2025-01-30 RX ORDER — VALSARTAN 40 MG/1
40 TABLET ORAL DAILY
Qty: 90 TABLET | Refills: 1 | Status: SHIPPED | OUTPATIENT
Start: 2025-01-30

## 2025-01-30 RX ORDER — METOPROLOL TARTRATE 50 MG/1
50 TABLET ORAL 2 TIMES DAILY
Qty: 60 TABLET | Refills: 11 | Status: SHIPPED | OUTPATIENT
Start: 2025-01-30 | End: 2026-01-30

## 2025-01-30 RX ORDER — HYDROCODONE BITARTRATE AND ACETAMINOPHEN 5; 325 MG/1; MG/1
1 TABLET ORAL
Status: COMPLETED | OUTPATIENT
Start: 2025-01-30 | End: 2025-01-30

## 2025-01-30 RX ORDER — QUINIDINE GLUCONATE 324 MG
480 TABLET, EXTENDED RELEASE ORAL 2 TIMES DAILY WITH MEALS
Qty: 120 TABLET | Refills: 3 | Status: SHIPPED | OUTPATIENT
Start: 2025-01-30

## 2025-01-30 RX ADMIN — HYDROCODONE BITARTRATE AND ACETAMINOPHEN 1 TABLET: 5; 325 TABLET ORAL at 06:01

## 2025-01-30 NOTE — ED PROVIDER NOTES
"Encounter Date: 1/30/2025       History     Chief Complaint   Patient presents with    Foot Pain     Reports "left foot pain" states "have appointment for ultrasound today to rule out blood clot in left leg" "could not handle pain so came here"      49-year-old male with previous history of alcoholic cirrhotic liver disease, osteoarthritis, congestive heart failure, gastroesophageal reflux, esophageal varices, previous GI bleed, left hip arthritis, hypertension, thrombocytopenia, previous pulmonary embolism, congestive heart failure.  Patient currently undergoing workup for gross hematuria with Urology follow up.  Patient was seen as outpatient diagnosed with left lower extremity/foot cellulitis.  Was told to have outpatient ultrasound performed as well as to follow up with primary care provider.  Was placed on Keflex.  Patient states waiting to  his medicines today.  Decided to come to the emergency department due to persistent pain.  Denies any fever, no trauma, denies any other constitutional symptoms.       Review of patient's allergies indicates:   Allergen Reactions    Ciprofloxacin hcl Hallucinations    Meperidine Hives     Pt medicated w/multiple medications (demerol, protonix, and zofran)  w/i 10 mins time frame prior to developing localized hives near IV site that med was administered. Pt reports he has/takes all other medications on daily basis.     Morphine Itching    Nsaids (non-steroidal anti-inflammatory drug)      Kidney Disease    Tylenol [acetaminophen]      Hx of liver disease     Past Medical History:   Diagnosis Date    Alcohol withdrawal 5/1/2022    Alcoholic cirrhosis of liver     Alcoholic ketoacidosis 6/6/2021    Arthritis     ATN (acute tubular necrosis)     CHF (congestive heart failure)     Diverticulitis 01/2020    Esophageal varices     GERD (gastroesophageal reflux disease)     GI bleed     Hip arthritis     Left    Hypertension     Liver cirrhosis     Macrocytic anemia     " Pulmonary embolism 08/2018    Unprovoked DVT.  Stop Coumadin due to GIB    Thrombocytopenia      Past Surgical History:   Procedure Laterality Date    ANKLE SURGERY      BLOCK, NERVE, PERIPHERAL Left 06/24/2022    Procedure: Left Hip femoral-obturator accessory nerve block;  Surgeon: Robbin De La Garza DO;  Location: [facility];  Service: Pain Management;  Laterality: Left;    COLON SURGERY  2007    COLONOSCOPY  09/05/2019    [facility]    COLONOSCOPY N/A 02/17/2021    Procedure: COLONOSCOPY;  Surgeon: Renea Billingsley MD;  Location: [facility];  Service: Endoscopy;  Laterality: N/A;    COLONOSCOPY N/A 2/13/2023    Procedure: COLONOSCOPY;  Surgeon: Rudy Orellana MD;  Location: [facility]  Service: Endoscopy;  Laterality: N/A;  cirrhosis-labs done on 1/11/23  instr portal-GT  No answer for precall- [place]    COLONOSCOPY N/A 3/10/2023    Procedure: COLONOSCOPY;  Surgeon: Rudy Orellana MD;  Location[facility]  Service: Endoscopy;  Laterality: N/A;  inst via poral per pt request    COLONOSCOPY W/ BIOPSIES  02/17/2021    ESOPHAGOGASTRODUODENOSCOPY N/A 07/26/2019    Procedure: EGD (ESOPHAGOGASTRODUODENOSCOPY);  Surgeon: Brian Trivedi MD;  Location: [facility];  Service: Endoscopy;  Laterality: N/A;    ESOPHAGOGASTRODUODENOSCOPY N/A 04/08/2020    Procedure: EGD (ESOPHAGOGASTRODUODENOSCOPY);  Surgeon: Donn Acuna MD;  Location: [facility];  Service: Endoscopy;  Laterality: N/A;    ESOPHAGOGASTRODUODENOSCOPY N/A 01/03/2021    Procedure: EGD (ESOPHAGOGASTRODUODENOSCOPY)- coffee ground emesis, hx varices;  Surgeon: Steve Chairez MD;  Location: [facility];  Service: Endoscopy;  Laterality: N/A;    ESOPHAGOGASTRODUODENOSCOPY N/A 05/03/2021    Procedure: EGD (ESOPHAGOGASTRODUODENOSCOPY);  Surgeon: Jeanmarie Ngo MD;  Location: [facility];  Service: Endoscopy;  Laterality: N/A;    ESOPHAGOGASTRODUODENOSCOPY N/A 07/19/2021    Procedure: ESOPHAGOGASTRODUODENOSCOPY (EGD);  Surgeon: Claudio Medeiros MD;   Location: [facility];  Service: Endoscopy;  Laterality: N/A;    ESOPHAGOGASTRODUODENOSCOPY  12/20/2021    ESOPHAGOGASTRODUODENOSCOPY N/A 12/20/2021    Procedure: EGD (ESOPHAGOGASTRODUODENOSCOPY);  Surgeon: Ajay Jackson MD;  Location: [facility];  Service: Endoscopy;  Laterality: N/A;    ESOPHAGOGASTRODUODENOSCOPY N/A 05/03/2022    Procedure: EGD (ESOPHAGOGASTRODUODENOSCOPY);  Surgeon: Brian Trivedi MD;  Location: [facility];  Service: Endoscopy;  Laterality: N/A;    ESOPHAGOGASTRODUODENOSCOPY N/A 7/7/2022    Procedure: EGD (ESOPHAGOGASTRODUODENOSCOPY);  Surgeon: Ajay Jackson MD;  Location: [facility];  Service: Endoscopy;  Laterality: N/A;    ESOPHAGOGASTRODUODENOSCOPY N/A 11/29/2022    Procedure: EGD (ESOPHAGOGASTRODUODENOSCOPY);  Surgeon: Edouard Maynard MD;  Location: [address]);  Service: Endoscopy;  Laterality: N/A;    ESOPHAGOGASTRODUODENOSCOPY N/A 4/28/2023    Procedure: EGD (ESOPHAGOGASTRODUODENOSCOPY);  Surgeon: Caesar Dickens MD;  Location: [facility] [place] [place];  Service: Endoscopy;  Laterality: N/A;    ESOPHAGOGASTRODUODENOSCOPY N/A 3/28/2024    Procedure: EGD (ESOPHAGOGASTRODUODENOSCOPY);  Surgeon: Jeanmarie Ngo MD;  Location: [facility] [place] [place];  Service: Endoscopy;  Laterality: N/A;    HERNIA REPAIR Left     Inguinal    INJECTION OF JOINT Right 9/28/2023    Procedure: RT Hip Injection;  Surgeon: Robbin De La Garza DO;  Location: [facility];  Service: Pain Management;  Laterality: Right;  oral - 20 mins    INJECTION OF JOINT Right 10/8/2024    Procedure: Right hip injection;  Surgeon: Robbin De La Garza DO;  Location: [facility];  Service: Pain Management;  Laterality: Right;  No sed no AC    UPPER GASTROINTESTINAL ENDOSCOPY N/A 07/07/2022     Family History   Problem Relation Name Age of Onset    Hypertension Mother      Breast cancer Mother      Hypertension Father      Prostate cancer Father      Bladder Cancer Father       Social History     Tobacco Use     Smoking status: Former     Current packs/day: 0.00     Types: Cigarettes     Quit date:      Years since quittin.0    Smokeless tobacco: Never    Tobacco comments:     quit 20 years ago   Substance Use Topics    Alcohol use: Not Currently     Comment: Quit drinking 21    Drug use: Not Currently     Review of Systems   Constitutional:  Negative for fever.   HENT:  Negative for sore throat.    Respiratory:  Negative for shortness of breath.    Cardiovascular:  Negative for chest pain.   Gastrointestinal:  Negative for nausea and vomiting.   Genitourinary:  Negative for dysuria.   Musculoskeletal:  Positive for arthralgias and myalgias. Negative for back pain.   Skin:  Negative for rash.        Left foot redness and pain   Neurological:  Negative for weakness.   Hematological:  Does not bruise/bleed easily.       Physical Exam     Initial Vitals [25 0357]   BP Pulse Resp Temp SpO2   (!) 180/86 100 16 98.3 °F (36.8 °C) 100 %      MAP       --         Physical Exam    Nursing note and vitals reviewed.  Constitutional: He appears well-developed and well-nourished.   HENT:   Head: Normocephalic and atraumatic.   Nose: Nose normal. Mouth/Throat: Oropharynx is clear and moist.   Eyes: Conjunctivae and EOM are normal. Pupils are equal, round, and reactive to light. No scleral icterus.   Neck: Neck supple.   Normal range of motion.  Cardiovascular:  Normal rate, regular rhythm, normal heart sounds and intact distal pulses.     Exam reveals no gallop and no friction rub.       No murmur heard.  Pulmonary/Chest: No stridor. No respiratory distress.   Course bilateral breath sounds no adventitious sounds   Abdominal: Abdomen is soft. Bowel sounds are normal. He exhibits no mass. There is no abdominal tenderness. There is no rebound and no guarding.   Musculoskeletal:         General: No edema. Normal range of motion.      Cervical back: Normal range of motion and neck supple.     Lymphadenopathy:     He has no  cervical adenopathy.   Neurological: He is alert and oriented to person, place, and time. He has normal strength and normal reflexes. He displays normal reflexes. No cranial nerve deficit or sensory deficit. GCS score is 15. GCS eye subscore is 4. GCS verbal subscore is 5. GCS motor subscore is 6.   Skin: Skin is warm and dry. Capillary refill takes less than 2 seconds. No rash noted.        Positive erythema with increased warmth.  No induration or fluctuance noted.  2+ dorsalis pedis posterior tibial pulse.   Psychiatric: He has a normal mood and affect. His behavior is normal. Judgment and thought content normal.         ED Course   Procedures  Labs Reviewed   CBC W/ AUTO DIFFERENTIAL - Abnormal       Result Value    WBC 7.27      RBC 2.66 (*)     Hemoglobin 8.8 (*)     Hematocrit 26.6 (*)      (*)     MCH 33.1 (*)     MCHC 33.1      RDW 16.1 (*)     Platelets 82 (*)     MPV 9.5      Immature Granulocytes 0.8 (*)     Gran # (ANC) 4.4      Immature Grans (Abs) 0.06 (*)     Lymph # 1.7      Mono # 0.9      Eos # 0.2      Baso # 0.02      nRBC 0      Gran % 61.0      Lymph % 22.8      Mono % 12.9      Eosinophil % 2.2      Basophil % 0.3      Differential Method Automated     COMPREHENSIVE METABOLIC PANEL - Abnormal    Sodium 134 (*)     Potassium 3.5      Chloride 108      CO2 22 (*)     Glucose 105      BUN 15      Creatinine 1.7 (*)     Calcium 8.1 (*)     Total Protein 6.7      Albumin 2.7 (*)     Total Bilirubin 3.9 (*)     Alkaline Phosphatase 93      AST 39      ALT 21      eGFR 48.5 (*)     Anion Gap 4 (*)    URIC ACID - Abnormal    Uric Acid 8.7 (*)           Imaging Results              US Lower Extremity Veins Left (Final result)  Result time 01/30/25 06:14:11   Procedure changed from US Extremity Non Vascular Complete Left     Final result by Matteo Maguire MD (01/30/25 06:14:11)                   Impression:      There is no sonographic evidence for intraluminal thrombus/DVT involving the  visualized venous structures of the left lower extremity.      Electronically signed by: Matteo Maguire  Date:    01/30/2025  Time:    06:14               Narrative:    EXAMINATION:  US LOWER EXTREMITY VEINS LEFT    CLINICAL HISTORY:  pain; Pain, unspecified    TECHNIQUE:  Duplex and color flow Doppler evaluation and graded compression of the left lower extremity veins was performed.    COMPARISON:  Bilateral lower extremity venous ultrasound November 19, 2020    FINDINGS:  Targeted sonographic evaluation of the venous structures of the left lower extremity was performed.  The visualized venous structures of the left lower extremity demonstrate sonographic evidence for flow and compressibility, there is no sonographic evidence for intraluminal thrombus/DVT.                                       Medications   HYDROcodone-acetaminophen 5-325 mg per tablet 1 tablet (1 tablet Oral Given 1/30/25 0653)     Medical Decision Making  Amount and/or Complexity of Data Reviewed  Labs: ordered.  Radiology: ordered.    Risk  Prescription drug management.               ED Course as of 01/30/25 0942   u Jan 30, 2025   0933 Patient seen evaluated emergency department.  Currently at this time patient did also endorse that he had history of lymphedema.  Did not  his antibiotics which he was being treated for cellulitis to his left lower extremity.  States he would pick it up today.  Patient was placed on Keflex.  Patient ultrasound in emergency department revealed no evidence of DVT.  Patient did not have occult fractures on x-ray as well.  Patient did have elevated uric acid level of 8.7.  With chronic electrolyte abnormalities including renal insufficiency with a creatinine of 1.7 also with hyperbilirubinemia which was well documented today was found to be 3.9.  Down from 5.13 months ago.  At this time patient most likely has acute gout flare.  Patient will be placed on prednisone 20 mg daily for next 5 days.  Also we  placed on allopurinol 50 mg daily for next 6 days.  Renal adjustment was given based on GFR of 48.  Patient instructed to continue with Keflex.  He is to avoid high uric acid producing foods including meats/red meat.  Also to avoid any alcohol consumption.  He is instructed to follow up with the appointment as scheduled.  Patient does state he has hematuria.  Currently being worked up.  Without abdominal pain or flank pain at this time.  Patient has Urology appointment scheduled for tomorrow.  Found to be anemic with H&H of 8 and 26.  Patient denies melena, no hematemesis, no abdominal pain.  He is informed that he was anemic with H&H down from 11 and 36 3 months ago to 8 and 26.  Currently denies shortness of breath weakness or dizziness.  Patient given detailed instructions to return if he is developing any of the symptoms.  Muscle will be placed on Norco as needed for pain.  Given detailed return instructions, including worsening pain, redness, fever or additional concerns.  Patient white count found to be normal at 7000, with no fever history.  Most likely symptoms related to gout and lymphedema.    [RM]   0934 BILIRUBIN TOTAL(!): 3.9 [RM]      ED Course User Index  [RM] Elton Oropeza MD                 Medical Decision Making:   Initial Assessment:   49-year-old male with previous history of alcoholic cirrhotic liver disease, osteoarthritis, congestive heart failure, gastroesophageal reflux, esophageal varices, previous GI bleed, left hip arthritis, hypertension, thrombocytopenia, previous pulmonary embolism, congestive heart failure.  Patient currently undergoing workup for gross hematuria with Urology follow up.  Patient was seen as outpatient diagnosed with left lower extremity/foot cellulitis.  Was told to have outpatient ultrasound performed as well as to follow up with primary care provider.  Was placed on Keflex.  Patient states waiting to  his medicines today.  Decided to come to the  emergency department due to persistent pain.  Denies any fever, no trauma, denies any other constitutional symptoms.     Differential Diagnosis:   DVT, cellulitis, gouty arthritis, occult metatarsal fracture  Clinical Tests:   Lab Tests: Ordered and Reviewed  Radiological Study: Ordered and Reviewed             Clinical Impression:  Final diagnoses:  [M10.9] Gouty arthritis of left foot (Primary)  [I89.0] Lymphedema  [K74.60, K76.9] Chronic liver disease and cirrhosis                 Elton Oropeza MD  01/30/25 0960

## 2025-02-03 ENCOUNTER — TELEPHONE (OUTPATIENT)
Dept: HEPATOLOGY | Facility: CLINIC | Age: 50
End: 2025-02-03
Payer: MEDICARE

## 2025-02-03 NOTE — TELEPHONE ENCOUNTER
Called the patient to schedule a follow up visit.  Scheduled to the next available appt 3/24/2025.  Patient confirmed and agreed with the appt. Reminder letter mailed

## 2025-02-03 NOTE — TELEPHONE ENCOUNTER
----- Message from RICO Urbina sent at 2/3/2025 10:29 AM CST -----  Regarding: scheduling  Good morning, this pt was last seen in August 2024 and was suppose to follow up in 3 months. Can we please get him scheduled for a follow up appt with Dr Arreaga ? Thank you

## 2025-02-05 NOTE — PROGRESS NOTES
Subjective:      Irvin Diaz Jr. is a 49 y.o. male who returns today regarding his UTI.    Last seen in November 20, 2024 for culture proven UTI.  Patient was not on appropriate antibiotic treatment at that time and he was instructed to follow-up in the office for recheck however he was lost to follow-up.  Most recently evaluated in the emergency room on 02/04 with complaint of gross hematuria and dysuria.  Urine culture revealed Staphylococcus aureus.  He was discharged with doxycycline and received Rocephin IV.  His preliminary blood cultures are positive for MRSA. Per chart review the ER attempted to reach him about results but the patient states he never had a missed call or spoke to anyone.  Cr 1.8  CT neg for stones, hydro, masses   He denies fever/chills/flank pain.    He denies previous  surgeries; has a history of colon resection in 2007  No previous prostate cancer screening        The following portions of the patient's history were reviewed and updated as appropriate: allergies, current medications, past family history, past medical history, past social history, past surgical history and problem list.    Review of Systems  A comprehensive multipoint review of systems was negative except as otherwise stated in the HPI.     Objective:   Vitals: /73 (BP Location: Left arm, Patient Position: Sitting)   Pulse 65   Ht 6' (1.829 m)   BMI 33.50 kg/m²     Physical Exam   General: alert and oriented, no acute distress  Respiratory: Symmetric expansion, non-labored breathing  Cardiovascular: regular rate and rhythm, no peripheral edema  Abdomen: soft, non distended  Genitourinary:defer   Skin: normal coloration and turgor, no rashes, no suspicious skin lesions noted  Neuro: no gross deficits  Psych: normal judgment and insight, normal mood/affect, and non-anxious    Lab Review   Urinalysis demonstrates positive for leukocytes, red blood cells  Lab Results   Component Value Date    WBC 9.96  02/04/2025    HGB 10.4 (L) 02/04/2025    HCT 31.5 (L) 02/04/2025    HCT 25 (L) 11/01/2022     (H) 02/04/2025    PLT 75 (L) 02/04/2025     Lab Results   Component Value Date    CREATININE 1.8 (H) 02/04/2025    CREATININE 0.91 09/02/2018    BUN 25 (H) 02/04/2025     Lab Results   Component Value Date    PSA 1.9 10/22/2024      3/27/2024 10/22/2024 1/29/2025   Urine Culture STAPHYLOCOCCUS AUREUS !  STAPHYLOCOCCUS AUREUS !  STAPHYLOCOCCUS AUREUS !       2/4/2025   Urine Culture STAPHYLOCOCCUS AUREUS !         Imaging     CT CHEST ABDOMEN PELVIS WITHOUT CONTRAST(XPD)     CLINICAL HISTORY:  Respiratory illness, nondiagnostic xray;supraclavicular edema;     TECHNIQUE:  Chest, abdomen and pelvis CT without IV contrast     COMPARISON:  CT abdomen and pelvis 11/28/2022.     FINDINGS:  The central tracheobronchial tree is patent.  Respiratory artifact limits evaluation of the lungs.  There is bilateral dependent subsegmental atelectasis.  No pleural effusions.     Heart is enlarged.  The thoracic aorta is normal caliber with calcified plaque formation.  No enlarged mediastinal lymph nodes or mass.     Liver is normal size with slight nodular contour.  The gallbladder and common bile duct within normal limits.  The pancreas, spleen and adrenal glands are unremarkable.  Kidneys are normal size.  There is no hydronephrosis or nephrolithiasis.  Bilateral nonspecific perinephric fat stranding.  The ureters are normal caliber without obstructions.  The bladder is mildly fluid filled and grossly unremarkable.  Prostate gland is grossly unremarkable.     Large and small bowel normal caliber.  Postsurgical changes of the ascending colon observed.  There is no bowel wall thickening or inflammatory changes.  The stomach is grossly unremarkable.     There are shotty periaortic lymph nodes.  There are enlarged periportal lymph nodes, largest measuring 1.9 x 1.6 cm.  The abdominal aorta is normal caliber.  The ventral abdominal  wall is unremarkable.  There are degenerative changes of the bilateral hips.  Bilateral gynecomastia observed.     Impression:     1. Cardiomegaly.  2. Slight nodular contour of the liver could reflect hepatic cirrhosis.  Correlate.  3. Shotty periaortic lymph nodes and a few enlarged periportal lymph nodes observed with largest periportal in load measuring 1.9 x 1.6 cm.  These could be reactive.  4. Additional incidental observations as described.        Electronically signed by:Jarret Dunaway  Date:                                            02/04/2025  Time:                                           15:31           Bladder Scan Random: 140 cc      Assessment and Plan:   1. Gross hematuria  2. Acute cystitis with hematuria  - cefTRIAXone injection 1 g  - sulfamethoxazole-trimethoprim 400-80mg (BACTRIM,SEPTRA) 400-80 mg per tablet; Take 1 tablet by mouth 2 (two) times daily.  Dispense: 28 tablet; Refill: 0     --multiple urine culture since November showing Staphylococcus aureus.  Patient completed antibiotics as directed in November however he is now having gross hematuria with preliminary positive blood cultures.  He received Rocephin IV in the emergency room.  We will give additional dose today and tomorrow.  Will start extended course of Bactrim based on sensitivity for suspected prostatitis.    This note is dictated on M*Modal word recognition program.  There are word recognition mistakes that are occasionally missed on review.

## 2025-02-06 ENCOUNTER — HOSPITAL ENCOUNTER (INPATIENT)
Facility: HOSPITAL | Age: 50
LOS: 4 days | Discharge: HOME HEALTH | DRG: 728 | End: 2025-02-11
Attending: STUDENT IN AN ORGANIZED HEALTH CARE EDUCATION/TRAINING PROGRAM | Admitting: INTERNAL MEDICINE
Payer: MEDICARE

## 2025-02-06 ENCOUNTER — OFFICE VISIT (OUTPATIENT)
Dept: UROLOGY | Facility: CLINIC | Age: 50
End: 2025-02-06
Payer: MEDICARE

## 2025-02-06 VITALS
BODY MASS INDEX: 33.5 KG/M2 | SYSTOLIC BLOOD PRESSURE: 129 MMHG | HEIGHT: 72 IN | DIASTOLIC BLOOD PRESSURE: 73 MMHG | HEART RATE: 65 BPM

## 2025-02-06 DIAGNOSIS — R78.81 BACTEREMIA DUE TO STAPHYLOCOCCUS: ICD-10-CM

## 2025-02-06 DIAGNOSIS — M79.89 LEG SWELLING: ICD-10-CM

## 2025-02-06 DIAGNOSIS — R78.81 STAPHYLOCOCCUS AUREUS BACTEREMIA: Primary | ICD-10-CM

## 2025-02-06 DIAGNOSIS — B95.61 STAPHYLOCOCCUS AUREUS BACTEREMIA: Primary | ICD-10-CM

## 2025-02-06 DIAGNOSIS — B95.8 BACTEREMIA DUE TO STAPHYLOCOCCUS: ICD-10-CM

## 2025-02-06 DIAGNOSIS — N39.0 RECURRENT UTI: ICD-10-CM

## 2025-02-06 DIAGNOSIS — N41.9 PROSTATITIS, UNSPECIFIED PROSTATITIS TYPE: ICD-10-CM

## 2025-02-06 DIAGNOSIS — R78.81 BACTEREMIA: ICD-10-CM

## 2025-02-06 DIAGNOSIS — M62.838 MUSCLE SPASMS OF NECK: ICD-10-CM

## 2025-02-06 DIAGNOSIS — Z13.6 SCREENING FOR CARDIOVASCULAR CONDITION: ICD-10-CM

## 2025-02-06 DIAGNOSIS — N30.01 ACUTE CYSTITIS WITH HEMATURIA: ICD-10-CM

## 2025-02-06 DIAGNOSIS — R07.9 CHEST PAIN: ICD-10-CM

## 2025-02-06 DIAGNOSIS — R31.0 GROSS HEMATURIA: Primary | ICD-10-CM

## 2025-02-06 LAB — POC RESIDUAL URINE VOLUME: 140 ML (ref 0–100)

## 2025-02-06 PROCEDURE — 99285 EMERGENCY DEPT VISIT HI MDM: CPT | Mod: 25

## 2025-02-06 PROCEDURE — 51798 US URINE CAPACITY MEASURE: CPT | Mod: PBBFAC,PN | Performed by: NURSE PRACTITIONER

## 2025-02-06 PROCEDURE — 99214 OFFICE O/P EST MOD 30 MIN: CPT | Mod: PBBFAC,PN | Performed by: NURSE PRACTITIONER

## 2025-02-06 PROCEDURE — 99999PBSHW PR PBB SHADOW TECHNICAL ONLY FILED TO HB: Mod: PBBFAC,,,

## 2025-02-06 PROCEDURE — 96372 THER/PROPH/DIAG INJ SC/IM: CPT | Mod: PBBFAC,PN

## 2025-02-06 PROCEDURE — 99999 PR PBB SHADOW E&M-EST. PATIENT-LVL IV: CPT | Mod: PBBFAC,,, | Performed by: NURSE PRACTITIONER

## 2025-02-06 PROCEDURE — 99214 OFFICE O/P EST MOD 30 MIN: CPT | Mod: S$PBB,,, | Performed by: NURSE PRACTITIONER

## 2025-02-06 PROCEDURE — 82565 ASSAY OF CREATININE: CPT

## 2025-02-06 PROCEDURE — 99999PBSHW POCT BLADDER SCAN: Mod: PBBFAC,,,

## 2025-02-06 RX ORDER — CEFTRIAXONE 1 G/1
1 INJECTION, POWDER, FOR SOLUTION INTRAMUSCULAR; INTRAVENOUS
Status: DISCONTINUED | OUTPATIENT
Start: 2025-02-06 | End: 2025-02-08 | Stop reason: HOSPADM

## 2025-02-06 RX ORDER — CEFTRIAXONE 1 G/1
2 INJECTION, POWDER, FOR SOLUTION INTRAMUSCULAR; INTRAVENOUS ONCE
Status: DISCONTINUED | OUTPATIENT
Start: 2025-02-06 | End: 2025-02-06

## 2025-02-06 RX ORDER — SULFAMETHOXAZOLE AND TRIMETHOPRIM 400; 80 MG/1; MG/1
1 TABLET ORAL 2 TIMES DAILY
Qty: 28 TABLET | Refills: 0 | Status: ON HOLD | OUTPATIENT
Start: 2025-02-06 | End: 2025-02-11 | Stop reason: HOSPADM

## 2025-02-06 RX ORDER — CEFTRIAXONE 1 G/1
1 INJECTION, POWDER, FOR SOLUTION INTRAMUSCULAR; INTRAVENOUS ONCE
Status: COMPLETED | OUTPATIENT
Start: 2025-02-07 | End: 2025-02-07

## 2025-02-06 RX ORDER — CEFTRIAXONE 1 G/1
1 INJECTION, POWDER, FOR SOLUTION INTRAMUSCULAR; INTRAVENOUS ONCE
Status: DISCONTINUED | OUTPATIENT
Start: 2025-02-06 | End: 2025-02-06

## 2025-02-06 RX ADMIN — CEFTRIAXONE SODIUM 1 G: 1 INJECTION, POWDER, FOR SOLUTION INTRAMUSCULAR; INTRAVENOUS at 02:02

## 2025-02-06 NOTE — PROGRESS NOTES
Verified pt ID using name and David Brenner. Administered Rocephin 1gr in UORQ gluteus luz maria per physician order using aseptic technique. Aspirated and no blood return noted. Pt tolerated well with no adverse reactions noted.

## 2025-02-07 PROBLEM — N41.0 ACUTE ON CHRONIC PROSTATITIS: Status: ACTIVE | Noted: 2025-02-07

## 2025-02-07 PROBLEM — N41.1 ACUTE ON CHRONIC PROSTATITIS: Status: ACTIVE | Noted: 2025-02-07

## 2025-02-07 PROBLEM — L03.116 CELLULITIS OF FOOT, LEFT: Status: ACTIVE | Noted: 2025-02-07

## 2025-02-07 PROBLEM — B95.8 BACTEREMIA DUE TO STAPHYLOCOCCUS: Status: ACTIVE | Noted: 2025-02-07

## 2025-02-07 PROBLEM — R78.81 BACTEREMIA DUE TO STAPHYLOCOCCUS: Status: ACTIVE | Noted: 2025-02-07

## 2025-02-07 LAB
ALBUMIN SERPL BCP-MCNC: 3.1 G/DL (ref 3.5–5.2)
ALP SERPL-CCNC: 109 U/L (ref 55–135)
ALT SERPL W/O P-5'-P-CCNC: 29 U/L (ref 10–44)
ANION GAP SERPL CALC-SCNC: 7 MMOL/L (ref 8–16)
AST SERPL-CCNC: 39 U/L (ref 10–40)
BASOPHILS # BLD AUTO: 0.04 K/UL (ref 0–0.2)
BASOPHILS NFR BLD: 0.4 % (ref 0–1.9)
BILIRUB SERPL-MCNC: 4.7 MG/DL (ref 0.1–1)
BILIRUB UR QL STRIP: ABNORMAL
BUN SERPL-MCNC: 23 MG/DL (ref 6–20)
CALCIUM SERPL-MCNC: 9.1 MG/DL (ref 8.7–10.5)
CHLORIDE SERPL-SCNC: 103 MMOL/L (ref 95–110)
CLARITY UR: CLEAR
CO2 SERPL-SCNC: 23 MMOL/L (ref 23–29)
COLOR UR: YELLOW
CREAT SERPL-MCNC: 2 MG/DL (ref 0.5–1.4)
CREAT SERPL-MCNC: 2.1 MG/DL (ref 0.5–1.4)
DIFFERENTIAL METHOD BLD: ABNORMAL
EOSINOPHIL # BLD AUTO: 0.1 K/UL (ref 0–0.5)
EOSINOPHIL NFR BLD: 1.1 % (ref 0–8)
ERYTHROCYTE [DISTWIDTH] IN BLOOD BY AUTOMATED COUNT: 16.5 % (ref 11.5–14.5)
EST. GFR  (NO RACE VARIABLE): 39.9 ML/MIN/1.73 M^2
GLUCOSE SERPL-MCNC: 96 MG/DL (ref 70–110)
GLUCOSE UR QL STRIP: NEGATIVE
HCT VFR BLD AUTO: 32.3 % (ref 40–54)
HGB BLD-MCNC: 10.6 G/DL (ref 14–18)
HGB UR QL STRIP: ABNORMAL
IMM GRANULOCYTES # BLD AUTO: 0.06 K/UL (ref 0–0.04)
IMM GRANULOCYTES NFR BLD AUTO: 0.6 % (ref 0–0.5)
KETONES UR QL STRIP: NEGATIVE
LDH SERPL L TO P-CCNC: 0.98 MMOL/L (ref 0.5–2.2)
LEUKOCYTE ESTERASE UR QL STRIP: ABNORMAL
LYMPHOCYTES # BLD AUTO: 1.9 K/UL (ref 1–4.8)
LYMPHOCYTES NFR BLD: 19.7 % (ref 18–48)
MCH RBC QN AUTO: 33.5 PG (ref 27–31)
MCHC RBC AUTO-ENTMCNC: 32.8 G/DL (ref 32–36)
MCV RBC AUTO: 102 FL (ref 82–98)
MICROSCOPIC COMMENT: ABNORMAL
MONOCYTES # BLD AUTO: 1.1 K/UL (ref 0.3–1)
MONOCYTES NFR BLD: 11.7 % (ref 4–15)
NEUTROPHILS # BLD AUTO: 6.4 K/UL (ref 1.8–7.7)
NEUTROPHILS NFR BLD: 66.5 % (ref 38–73)
NITRITE UR QL STRIP: NEGATIVE
NRBC BLD-RTO: 0 /100 WBC
PH UR STRIP: 6 [PH] (ref 5–8)
PLATELET # BLD AUTO: 140 K/UL (ref 150–450)
PMV BLD AUTO: 9.8 FL (ref 9.2–12.9)
POTASSIUM SERPL-SCNC: 3.8 MMOL/L (ref 3.5–5.1)
PROCALCITONIN SERPL IA-MCNC: 0.76 NG/ML (ref 0–0.5)
PROT SERPL-MCNC: 8.7 G/DL (ref 6–8.4)
PROT UR QL STRIP: ABNORMAL
RBC # BLD AUTO: 3.16 M/UL (ref 4.6–6.2)
RBC #/AREA URNS HPF: 14 /HPF (ref 0–4)
SAMPLE: ABNORMAL
SAMPLE: NORMAL
SODIUM SERPL-SCNC: 133 MMOL/L (ref 136–145)
SP GR UR STRIP: 1.03 (ref 1–1.03)
SQUAMOUS #/AREA URNS HPF: 3 /HPF
URN SPEC COLLECT METH UR: ABNORMAL
UROBILINOGEN UR STRIP-ACNC: >=8 EU/DL
WBC # BLD AUTO: 9.64 K/UL (ref 3.9–12.7)
WBC #/AREA URNS HPF: 15 /HPF (ref 0–5)

## 2025-02-07 PROCEDURE — 25000003 PHARM REV CODE 250: Performed by: STUDENT IN AN ORGANIZED HEALTH CARE EDUCATION/TRAINING PROGRAM

## 2025-02-07 PROCEDURE — 25000003 PHARM REV CODE 250: Performed by: NURSE PRACTITIONER

## 2025-02-07 PROCEDURE — 63600175 PHARM REV CODE 636 W HCPCS: Performed by: INTERNAL MEDICINE

## 2025-02-07 PROCEDURE — 12000002 HC ACUTE/MED SURGE SEMI-PRIVATE ROOM

## 2025-02-07 PROCEDURE — 87086 URINE CULTURE/COLONY COUNT: CPT | Performed by: STUDENT IN AN ORGANIZED HEALTH CARE EDUCATION/TRAINING PROGRAM

## 2025-02-07 PROCEDURE — 84145 PROCALCITONIN (PCT): CPT | Performed by: STUDENT IN AN ORGANIZED HEALTH CARE EDUCATION/TRAINING PROGRAM

## 2025-02-07 PROCEDURE — 81001 URINALYSIS AUTO W/SCOPE: CPT | Performed by: STUDENT IN AN ORGANIZED HEALTH CARE EDUCATION/TRAINING PROGRAM

## 2025-02-07 PROCEDURE — 99223 1ST HOSP IP/OBS HIGH 75: CPT | Mod: ,,, | Performed by: INTERNAL MEDICINE

## 2025-02-07 PROCEDURE — 93010 ELECTROCARDIOGRAM REPORT: CPT | Mod: ,,, | Performed by: INTERNAL MEDICINE

## 2025-02-07 PROCEDURE — 25000003 PHARM REV CODE 250: Performed by: INTERNAL MEDICINE

## 2025-02-07 PROCEDURE — 36415 COLL VENOUS BLD VENIPUNCTURE: CPT | Performed by: STUDENT IN AN ORGANIZED HEALTH CARE EDUCATION/TRAINING PROGRAM

## 2025-02-07 PROCEDURE — 87040 BLOOD CULTURE FOR BACTERIA: CPT | Mod: 59 | Performed by: STUDENT IN AN ORGANIZED HEALTH CARE EDUCATION/TRAINING PROGRAM

## 2025-02-07 PROCEDURE — 80053 COMPREHEN METABOLIC PANEL: CPT | Performed by: STUDENT IN AN ORGANIZED HEALTH CARE EDUCATION/TRAINING PROGRAM

## 2025-02-07 PROCEDURE — 93005 ELECTROCARDIOGRAM TRACING: CPT | Performed by: INTERNAL MEDICINE

## 2025-02-07 PROCEDURE — 63600175 PHARM REV CODE 636 W HCPCS: Performed by: NURSE PRACTITIONER

## 2025-02-07 PROCEDURE — 63600175 PHARM REV CODE 636 W HCPCS: Performed by: STUDENT IN AN ORGANIZED HEALTH CARE EDUCATION/TRAINING PROGRAM

## 2025-02-07 PROCEDURE — 96374 THER/PROPH/DIAG INJ IV PUSH: CPT

## 2025-02-07 PROCEDURE — 85025 COMPLETE CBC W/AUTO DIFF WBC: CPT | Performed by: STUDENT IN AN ORGANIZED HEALTH CARE EDUCATION/TRAINING PROGRAM

## 2025-02-07 RX ORDER — ONDANSETRON HYDROCHLORIDE 2 MG/ML
4 INJECTION, SOLUTION INTRAVENOUS EVERY 6 HOURS PRN
Status: DISCONTINUED | OUTPATIENT
Start: 2025-02-07 | End: 2025-02-11 | Stop reason: HOSPADM

## 2025-02-07 RX ORDER — GLUCAGON 1 MG
1 KIT INJECTION
Status: DISCONTINUED | OUTPATIENT
Start: 2025-02-07 | End: 2025-02-11 | Stop reason: HOSPADM

## 2025-02-07 RX ORDER — AMOXICILLIN 250 MG
1 CAPSULE ORAL DAILY PRN
Status: DISCONTINUED | OUTPATIENT
Start: 2025-02-07 | End: 2025-02-11 | Stop reason: HOSPADM

## 2025-02-07 RX ORDER — SODIUM,POTASSIUM PHOSPHATES 280-250MG
2 POWDER IN PACKET (EA) ORAL
Status: DISCONTINUED | OUTPATIENT
Start: 2025-02-07 | End: 2025-02-11 | Stop reason: HOSPADM

## 2025-02-07 RX ORDER — CEFTRIAXONE 1 G/1
1 INJECTION, POWDER, FOR SOLUTION INTRAMUSCULAR; INTRAVENOUS
Status: DISCONTINUED | OUTPATIENT
Start: 2025-02-07 | End: 2025-02-07

## 2025-02-07 RX ORDER — METOPROLOL TARTRATE 50 MG/1
50 TABLET ORAL 2 TIMES DAILY
Status: DISCONTINUED | OUTPATIENT
Start: 2025-02-07 | End: 2025-02-11 | Stop reason: HOSPADM

## 2025-02-07 RX ORDER — TALC
6 POWDER (GRAM) TOPICAL NIGHTLY PRN
Status: DISCONTINUED | OUTPATIENT
Start: 2025-02-07 | End: 2025-02-11 | Stop reason: HOSPADM

## 2025-02-07 RX ORDER — ALUMINUM HYDROXIDE, MAGNESIUM HYDROXIDE, AND SIMETHICONE 1200; 120; 1200 MG/30ML; MG/30ML; MG/30ML
30 SUSPENSION ORAL 4 TIMES DAILY PRN
Status: DISCONTINUED | OUTPATIENT
Start: 2025-02-07 | End: 2025-02-11 | Stop reason: HOSPADM

## 2025-02-07 RX ORDER — MUPIROCIN 20 MG/G
OINTMENT TOPICAL 2 TIMES DAILY
Status: DISCONTINUED | OUTPATIENT
Start: 2025-02-07 | End: 2025-02-11 | Stop reason: HOSPADM

## 2025-02-07 RX ORDER — CEFTRIAXONE 1 G/1
1 INJECTION, POWDER, FOR SOLUTION INTRAMUSCULAR; INTRAVENOUS ONCE
Status: COMPLETED | OUTPATIENT
Start: 2025-02-07 | End: 2025-02-07

## 2025-02-07 RX ORDER — CEFTRIAXONE 2 G/1
2 INJECTION, POWDER, FOR SOLUTION INTRAMUSCULAR; INTRAVENOUS DAILY
Status: DISCONTINUED | OUTPATIENT
Start: 2025-02-08 | End: 2025-02-07

## 2025-02-07 RX ORDER — IBUPROFEN 200 MG
24 TABLET ORAL
Status: DISCONTINUED | OUTPATIENT
Start: 2025-02-07 | End: 2025-02-11 | Stop reason: HOSPADM

## 2025-02-07 RX ORDER — METHOCARBAMOL 500 MG/1
500 TABLET, FILM COATED ORAL EVERY 6 HOURS PRN
Status: DISCONTINUED | OUTPATIENT
Start: 2025-02-07 | End: 2025-02-11 | Stop reason: HOSPADM

## 2025-02-07 RX ORDER — SPIRONOLACTONE 25 MG/1
25 TABLET ORAL DAILY
Status: DISCONTINUED | OUTPATIENT
Start: 2025-02-07 | End: 2025-02-11 | Stop reason: HOSPADM

## 2025-02-07 RX ORDER — FAMOTIDINE 10 MG/ML
20 INJECTION INTRAVENOUS 2 TIMES DAILY
Status: DISCONTINUED | OUTPATIENT
Start: 2025-02-07 | End: 2025-02-07

## 2025-02-07 RX ORDER — LANOLIN ALCOHOL/MO/W.PET/CERES
800 CREAM (GRAM) TOPICAL
Status: DISCONTINUED | OUTPATIENT
Start: 2025-02-07 | End: 2025-02-11 | Stop reason: HOSPADM

## 2025-02-07 RX ORDER — TRAMADOL HYDROCHLORIDE 50 MG/1
50 TABLET ORAL EVERY 6 HOURS PRN
Status: DISCONTINUED | OUTPATIENT
Start: 2025-02-07 | End: 2025-02-11 | Stop reason: HOSPADM

## 2025-02-07 RX ORDER — CEFTRIAXONE 2 G/1
2 INJECTION, POWDER, FOR SOLUTION INTRAMUSCULAR; INTRAVENOUS DAILY
Status: DISCONTINUED | OUTPATIENT
Start: 2025-02-07 | End: 2025-02-07

## 2025-02-07 RX ORDER — ENOXAPARIN SODIUM 100 MG/ML
40 INJECTION SUBCUTANEOUS EVERY 24 HOURS
Status: DISCONTINUED | OUTPATIENT
Start: 2025-02-07 | End: 2025-02-11 | Stop reason: HOSPADM

## 2025-02-07 RX ORDER — FAMOTIDINE 10 MG/ML
20 INJECTION INTRAVENOUS DAILY
Status: DISCONTINUED | OUTPATIENT
Start: 2025-02-07 | End: 2025-02-08

## 2025-02-07 RX ORDER — NALOXONE HCL 0.4 MG/ML
0.02 VIAL (ML) INJECTION
Status: DISCONTINUED | OUTPATIENT
Start: 2025-02-07 | End: 2025-02-11 | Stop reason: HOSPADM

## 2025-02-07 RX ORDER — TRAMADOL HYDROCHLORIDE 50 MG/1
50 TABLET ORAL
Status: COMPLETED | OUTPATIENT
Start: 2025-02-07 | End: 2025-02-07

## 2025-02-07 RX ORDER — IBUPROFEN 200 MG
16 TABLET ORAL
Status: DISCONTINUED | OUTPATIENT
Start: 2025-02-07 | End: 2025-02-11 | Stop reason: HOSPADM

## 2025-02-07 RX ADMIN — MUPIROCIN 1 G: 20 OINTMENT TOPICAL at 08:02

## 2025-02-07 RX ADMIN — TRAMADOL HYDROCHLORIDE 50 MG: 50 TABLET, COATED ORAL at 01:02

## 2025-02-07 RX ADMIN — OXACILLIN 12 G: 2 INJECTION, POWDER, FOR SOLUTION INTRAMUSCULAR; INTRAVENOUS at 09:02

## 2025-02-07 RX ADMIN — TRAMADOL HYDROCHLORIDE 50 MG: 50 TABLET, COATED ORAL at 03:02

## 2025-02-07 RX ADMIN — ENOXAPARIN SODIUM 40 MG: 40 INJECTION SUBCUTANEOUS at 04:02

## 2025-02-07 RX ADMIN — CEFTRIAXONE 1 G: 1 INJECTION, POWDER, FOR SOLUTION INTRAMUSCULAR; INTRAVENOUS at 02:02

## 2025-02-07 RX ADMIN — FAMOTIDINE 20 MG: 10 INJECTION, SOLUTION INTRAVENOUS at 09:02

## 2025-02-07 RX ADMIN — SPIRONOLACTONE 25 MG: 25 TABLET, FILM COATED ORAL at 01:02

## 2025-02-07 RX ADMIN — METHOCARBAMOL 500 MG: 500 TABLET ORAL at 08:02

## 2025-02-07 RX ADMIN — TRAMADOL HYDROCHLORIDE 50 MG: 50 TABLET, COATED ORAL at 08:02

## 2025-02-07 RX ADMIN — CEFTRIAXONE 1 G: 1 INJECTION, POWDER, FOR SOLUTION INTRAMUSCULAR; INTRAVENOUS at 09:02

## 2025-02-07 RX ADMIN — MUPIROCIN 1 G: 20 OINTMENT TOPICAL at 09:02

## 2025-02-07 RX ADMIN — METOPROLOL TARTRATE 50 MG: 50 TABLET, FILM COATED ORAL at 08:02

## 2025-02-07 NOTE — PLAN OF CARE
[facility] - Emergency Dept  Initial Discharge Assessment       Primary Care Provider: Edouard Gibson MD    Admission Diagnosis: Bacteremia [R78.81]    Information reviewed with patient and updated as needed. Patient lives with spouse Aj- who is NOK.  Patient states he still drives himself and is independent with most tasks. Patient uses a rolling walker and cane as needed for ambulation.  Denies HH/HD/Coumadin.  [facility] on [address]     Admission Date: 2/6/2025  Expected Discharge Date: 2/10/2025    Transition of Care Barriers: None    Payor: MEDICARE / Plan: MEDICARE PART A & B / Product Type: Government /     Extended Emergency Contact Information  Primary Emergency Contact: JoeFlorentino  Address: [address] [place] [address] [facility]  Home Phone: [phone]  Mobile Phone: [phone]  Relation: Spouse  Preferred language: English   needed? No  Secondary Emergency Contact: Irvin Diaz   [place]  Mobile Phone: [phone]  Relation: Father  Preferred language: English   needed? No  Mother: Sharon Diaz  Address: [address] [place] [address] [place]  Mobile Phone: [phone]    Discharge Plan A: Home with family  Discharge Plan B: Home      [id number] - [place], [place] - [address] & [facility]  [address] [facility] [address]  Phone: [phone] Fax: [phone]    [facility] [address] [address]  Phone: [phone] Fax: [phone]    [facility]  [address]  [facility] [address]  Phone: [phone] Fax: [phone]    [facility] - [place]  [address]  [address]  [place] [address]  Phone: [phone] Fax: [phone]      Initial Assessment (most recent)       Adult Discharge Assessment -  02/07/25 1406          Discharge Assessment    Assessment Type Discharge Planning Assessment     Confirmed/corrected address, phone number and insurance Yes     Confirmed Demographics Correct on Facesheet     Source of Information patient     When was your last doctors appointment? 01/29/25     Communicated RADHA with patient/caregiver Yes     Reason For Admission bacteremia     People in Home spouse     Facility Arrived From: home     Do you expect to return to your current living situation? Yes     Do you have help at home or someone to help you manage your care at home? Yes     Who are your caregiver(s) and their phone number(s)? spouse Florentino- [phone]     Prior to hospitilization cognitive status: Alert/Oriented     Current cognitive status: Alert/Oriented     Walking or Climbing Stairs Difficulty yes     Walking or Climbing Stairs ambulation difficulty, requires equipment;stair climbing difficulty, requires equipment     Mobility Management walker, cane     Dressing/Bathing Difficulty no     Equipment Currently Used at Home walker, rolling;cane, straight     Readmission within 30 days? No     Patient currently being followed by outpatient case management? No     Do you currently have service(s) that help you manage your care at home? No     Do you take prescription medications? Yes     Do you have prescription coverage? Yes     Do you have any problems affording any of your prescribed medications? No     Is the patient taking medications as prescribed? yes     Who is going to help you get home at discharge? spouse Florentino     How do you get to doctors appointments? family or friend will provide     Are you on dialysis? No     Do you take coumadin? No     Discharge Plan A Home with family     Discharge Plan B Home     DME Needed Upon Discharge  none     Discharge Plan discussed with: Patient     Transition of Care Barriers None

## 2025-02-07 NOTE — SUBJECTIVE & OBJECTIVE
Past Medical History:   Diagnosis Date    Alcohol withdrawal 5/1/2022    Alcoholic cirrhosis of liver     Alcoholic ketoacidosis 6/6/2021    Arthritis     ATN (acute tubular necrosis)     CHF (congestive heart failure)     Diverticulitis 01/2020    Esophageal varices     GERD (gastroesophageal reflux disease)     GI bleed     Hip arthritis     Left    Hypertension     Liver cirrhosis     Macrocytic anemia     Pulmonary embolism 08/2018    Unprovoked DVT.  Stop Coumadin due to GIB    Thrombocytopenia        Past Surgical History:   Procedure Laterality Date    ANKLE SURGERY      BLOCK, NERVE, PERIPHERAL Left 06/24/2022    Procedure: Left Hip femoral-obturator accessory nerve block;  Surgeon: Robbin De La Garza DO;  Location: [facility] OR;  Service: Pain Management;  Laterality: Left;    COLON SURGERY  2007    COLONOSCOPY  09/05/2019    [facility]    COLONOSCOPY N/A 02/17/2021    Procedure: COLONOSCOPY;  Surgeon: Renea Billingsley MD;  Location: [facility];  Service: Endoscopy;  Laterality: N/A;    COLONOSCOPY N/A 2/13/2023    Procedure: COLONOSCOPY;  Surgeon: Rudy Orellana MD;  Location: [facility] ([address] [place] [address]);  Service: Endoscopy;  Laterality: N/A;  cirrhosis-labs done on 1/11/23  instr portal-GT  No answer for precall- [place]    COLONOSCOPY N/A 3/10/2023    Procedure: COLONOSCOPY;  Surgeon: Rudy Orellana MD;  Location: [facility] ([address] [place] [address]);  Service: Endoscopy;  Laterality: N/A;  inst via poral per pt request    COLONOSCOPY W/ BIOPSIES  02/17/2021    ESOPHAGOGASTRODUODENOSCOPY N/A 07/26/2019    Procedure: EGD (ESOPHAGOGASTRODUODENOSCOPY);  Surgeon: Brian Trivedi MD;  Location: [facility];  Service: Endoscopy;  Laterality: N/A;    ESOPHAGOGASTRODUODENOSCOPY N/A 04/08/2020    Procedure: EGD (ESOPHAGOGASTRODUODENOSCOPY);  Surgeon: Donn Acuna MD;  Location: [facility];  Service: Endoscopy;  Laterality: N/A;    ESOPHAGOGASTRODUODENOSCOPY N/A 01/03/2021    Procedure: EGD (ESOPHAGOGASTRODUODENOSCOPY)- coffee ground emesis, hx varices;   Surgeon: Steve Chairez MD;  Location: [facility];  Service: Endoscopy;  Laterality: N/A;    ESOPHAGOGASTRODUODENOSCOPY N/A 05/03/2021    Procedure: EGD (ESOPHAGOGASTRODUODENOSCOPY);  Surgeon: Jeanmarie Ngo MD;  Location: [facility];  Service: Endoscopy;  Laterality: N/A;    ESOPHAGOGASTRODUODENOSCOPY N/A 07/19/2021    Procedure: ESOPHAGOGASTRODUODENOSCOPY (EGD);  Surgeon: Claudio Medeiros MD;  Location: [facility];  Service: Endoscopy;  Laterality: N/A;    ESOPHAGOGASTRODUODENOSCOPY  12/20/2021    ESOPHAGOGASTRODUODENOSCOPY N/A 12/20/2021    Procedure: EGD (ESOPHAGOGASTRODUODENOSCOPY);  Surgeon: Ajay Jackson MD;  Location: [facility];  Service: Endoscopy;  Laterality: N/A;    ESOPHAGOGASTRODUODENOSCOPY N/A 05/03/2022    Procedure: EGD (ESOPHAGOGASTRODUODENOSCOPY);  Surgeon: Brian Trivedi MD;  Location: [facility];  Service: Endoscopy;  Laterality: N/A;    ESOPHAGOGASTRODUODENOSCOPY N/A 7/7/2022    Procedure: EGD (ESOPHAGOGASTRODUODENOSCOPY);  Surgeon: Ajay Jackson MD;  Location: [facility];  Service: Endoscopy;  Laterality: N/A;    ESOPHAGOGASTRODUODENOSCOPY N/A 11/29/2022    Procedure: EGD (ESOPHAGOGASTRODUODENOSCOPY);  Surgeon: Edouard Maynard MD;  Location: [address]);  Service: Endoscopy;  Laterality: N/A;    ESOPHAGOGASTRODUODENOSCOPY N/A 4/28/2023    Procedure: EGD (ESOPHAGOGASTRODUODENOSCOPY);  Surgeon: Caesar Dickens MD;  Location: [facility];  Service: Endoscopy;  Laterality: N/A;    ESOPHAGOGASTRODUODENOSCOPY N/A 3/28/2024    Procedure: EGD (ESOPHAGOGASTRODUODENOSCOPY);  Surgeon: Jeanmarie Ngo MD;  Location: [facility];  Service: Endoscopy;  Laterality: N/A;    HERNIA REPAIR Left     Inguinal    INJECTION OF JOINT Right 9/28/2023    Procedure: RT Hip Injection;  Surgeon: Robbin De La Garza DO;  Location: [facility];  Service: Pain Management;  Laterality: Right;  oral - 20 mins    INJECTION OF JOINT Right 10/8/2024    Procedure: Right hip injection;   Surgeon: Robbin De La Garza DO;  Location: [facility];  Service: Pain Management;  Laterality: Right;  No sed no AC    UPPER GASTROINTESTINAL ENDOSCOPY N/A 07/07/2022       Review of patient's allergies indicates:   Allergen Reactions    Ciprofloxacin hcl Hallucinations    Meperidine Hives     Pt medicated w/multiple medications (demerol, protonix, and zofran)  w/i 10 mins time frame prior to developing localized hives near IV site that med was administered. Pt reports he has/takes all other medications on daily basis.     Morphine Itching    Nsaids (non-steroidal anti-inflammatory drug)      Kidney Disease    Tylenol [acetaminophen]      Hx of liver disease       Current Facility-Administered Medications on File Prior to Encounter   Medication    cefTRIAXone injection 1 g     Current Outpatient Medications on File Prior to Encounter   Medication Sig    doxycycline (VIBRAMYCIN) 100 MG Cap Take 1 capsule (100 mg total) by mouth every 12 (twelve) hours. for 7 days    ferrous gluconate (FERATE) 240 (27 FE) MG tablet Take 2 tablets (480 mg total) by mouth 2 (two) times daily with meals.    folic acid (FOLVITE) 1 MG tablet Take 1 tablet (1 mg total) by mouth once daily.    HYDROcodone-acetaminophen (NORCO) 5-325 mg per tablet Take 1 tablet by mouth every 6 (six) hours as needed for Pain.    methocarbamoL (ROBAXIN) 500 MG Tab Take 1 tablet (500 mg total) by mouth 4 (four) times daily. for 10 days    metoprolol tartrate (LOPRESSOR) 50 MG tablet Take 1 tablet (50 mg total) by mouth 2 (two) times daily. Do not take if heart rate is less than 60    nortriptyline (PAMELOR) 25 MG capsule Take 1 capsule (25 mg total) by mouth every evening.    rifAXIMin (XIFAXAN) 550 mg Tab Take 1 tablet (550 mg total) by mouth 2 (two) times daily.    spironolactone (ALDACTONE) 25 MG tablet Take 1 tablet (25 mg total) by mouth once daily.    tadalafiL (CIALIS) 20 MG Tab Take 1 tablet (20 mg total) by mouth daily as needed  (erectile dysfunction).    thiamine 100 MG tablet Take 1 tablet (100 mg total) by mouth once daily.    traMADoL (ULTRAM) 50 mg tablet Take 1 tablet (50 mg total) by mouth 4 (four) times daily as needed for Pain.    amoxicillin-clavulanate 875-125mg (AUGMENTIN) 875-125 mg per tablet Take 1 tablet by mouth 2 (two) times daily. for 10 days    FARXIGA 10 mg tablet Take 1 tablet (10 mg total) by mouth once daily. (Patient not taking: Reported on 2025)    papaverine 30 mg/mL injection Add phentolamine 10 mgs/ml., add PGE1  100 micrograms.    sulfamethoxazole-trimethoprim 400-80mg (BACTRIM,SEPTRA) 400-80 mg per tablet Take 1 tablet by mouth 2 (two) times daily. (Patient not taking: Reported on 2025)    valsartan (DIOVAN) 40 MG tablet Take 1 tablet (40 mg total) by mouth once daily. (Patient not taking: Reported on 2025)    [DISCONTINUED] allopurinoL (ZYLOPRIM) 100 MG tablet Take 0.5 tablets (50 mg total) by mouth once daily. for 6 days     Family History       Problem Relation (Age of Onset)    Bladder Cancer Father    Breast cancer Mother    Hypertension Mother, Father    Prostate cancer Father          Tobacco Use    Smoking status: Former     Current packs/day: 0.00     Types: Cigarettes     Quit date:      Years since quittin.    Smokeless tobacco: Never    Tobacco comments:     quit 20 years ago   Substance and Sexual Activity    Alcohol use: Not Currently     Comment: Quit drinking 21    Drug use: Not Currently    Sexual activity: Yes     Comment: occ     Review of Systems   Constitutional:  Negative for chills and fever.   Respiratory:  Negative for chest tightness and shortness of breath.    Cardiovascular:  Positive for leg swelling. Negative for chest pain.   Gastrointestinal:  Negative for nausea and vomiting.   Genitourinary:  Positive for hematuria. Negative for difficulty urinating and dysuria.   Skin:  Positive for color change.     Objective:     Vital Signs (Most Recent):  Temp:  98.7 °F (37.1 °C) (02/06/25 2153)  Pulse: 78 (02/07/25 1026)  Resp: 16 (02/07/25 0334)  BP: (!) 145/69 (02/07/25 1026)  SpO2: 97 % (02/07/25 1026) Vital Signs (24h Range):  Temp:  [98.7 °F (37.1 °C)] 98.7 °F (37.1 °C)  Pulse:  [65-81] 78  Resp:  [16] 16  SpO2:  [96 %-100 %] 97 %  BP: (106-181)/(53-75) 145/69     Weight: 108.9 kg (240 lb)  Body mass index is 32.55 kg/m².     Physical Exam  Constitutional:       Appearance: Normal appearance.   HENT:      Head: Normocephalic.   Eyes:      General: Scleral icterus present.   Cardiovascular:      Rate and Rhythm: Normal rate and regular rhythm.      Pulses: Normal pulses.      Heart sounds: Normal heart sounds.   Pulmonary:      Effort: Pulmonary effort is normal.      Breath sounds: Normal breath sounds.   Abdominal:      General: Abdomen is flat. Bowel sounds are normal.      Palpations: Abdomen is soft.   Musculoskeletal:         General: Swelling (left ankle/foot) and tenderness (left ankle/foot) present.      Cervical back: Normal range of motion and neck supple.      Right lower leg: Edema present.      Left lower leg: Edema present.   Skin:     Capillary Refill: Capillary refill takes less than 2 seconds.      Findings: Erythema (dorsum of LEFT foot with erythema and warmth to touch) present.   Neurological:      General: No focal deficit present.      Mental Status: He is alert and oriented to person, place, and time. Mental status is at baseline.   Psychiatric:         Mood and Affect: Mood normal.         Behavior: Behavior normal.         Thought Content: Thought content normal.         Judgment: Judgment normal.                Significant Labs: All pertinent labs within the past 24 hours have been reviewed.  Bilirubin:   Recent Labs   Lab 01/30/25  0701 02/04/25  1138 02/07/25  0402   BILITOT 3.9* 3.6* 4.7*     Blood Culture:   Recent Labs   Lab 02/07/25  0233 02/07/25  0234   LABBLOO No Growth to date No Growth to date     CBC:   Recent Labs   Lab  02/07/25  0402   WBC 9.64   HGB 10.6*   HCT 32.3*   *     CMP:   Recent Labs   Lab 02/07/25  0402   *   K 3.8      CO2 23   GLU 96   BUN 23*   CREATININE 2.0*   CALCIUM 9.1   PROT 8.7*   ALBUMIN 3.1*   BILITOT 4.7*   ALKPHOS 109   AST 39   ALT 29   ANIONGAP 7*       Urine Studies:   Recent Labs   Lab 02/07/25  0041   COLORU Yellow   APPEARANCEUA Clear   PHUR 6.0   SPECGRAV 1.030   PROTEINUA Trace*   GLUCUA Negative   KETONESU Negative   BILIRUBINUA 1+*   OCCULTUA 2+*   NITRITE Negative   UROBILINOGEN >=8.0*   LEUKOCYTESUR Trace*   RBCUA 14*   WBCUA 15*   SQUAMEPITHEL 3       Significant Imaging: I have reviewed all pertinent imaging results/findings within the past 24 hours.

## 2025-02-07 NOTE — ASSESSMENT & PLAN NOTE
- Pt with MSSA on blood cultures from 02/05.  Repeat blood cultures drawn 2/7   - Pt placed on IV Rocephin which we will continue  - urine cultures growing the same felt to be related to prostatitis.  Recently seen by Urology in clinic-she comments on continued presence of staph in urine despite multiple courses of treatment and was considering a prolonged and multidrug course prior to admission.  - given recurrent infection, now bacteremia-we will consult with ID to ensure appropriate timing and choice of antibiotic therapy  - ECHO ordered to rule out vegetation   -will obtain MRI of the left ankle/hindfoot to rule out any osteomyelitis

## 2025-02-07 NOTE — ASSESSMENT & PLAN NOTE
Patient's blood pressure range in the last 24 hours was: BP  Min: 106/53  Max: 181/75.The patient's inpatient anti-hypertensive regimen is listed below:  Current Antihypertensives   Metoprolol    Plan  - BP is controlled, no changes needed to their regimen

## 2025-02-07 NOTE — ASSESSMENT & PLAN NOTE
-urine culture growing MSSA  -continue the Rocephin   -id consultation   -will need continued follow-up with Urology as an outpatient ensure resolution

## 2025-02-07 NOTE — ED PROVIDER NOTES
Encounter Date: 2/6/2025       History     Chief Complaint   Patient presents with    Called to Return     Pt states he got a call to return to ed.        HPI  49-year-old man presents after he was told to return to the emergency room for bacteria in his blood.  He reports that he went to the emergency department on the 4th for left ankle swelling that is been ongoing since the snow storm earlier in January.  He reports it is getting somewhat better.  He reports he lives with some element of left ankle swelling ever since he broke his left hip.  He denies acute new fall.  He denies fever chills cough congestion belly pain nausea or vomiting change in bowel or bladder habits.  He reports he had some dysuria on the 4th when he was here but that has not improved.  He reports getting a shot of Rocephin today at his urologist.  He reports a history of cirrhosis and CKD.  Does not drink.  Denies a history of IVDU  Review of patient's allergies indicates:   Allergen Reactions    Ciprofloxacin hcl Hallucinations    Meperidine Hives     Pt medicated w/multiple medications (demerol, protonix, and zofran)  w/i 10 mins time frame prior to developing localized hives near IV site that med was administered. Pt reports he has/takes all other medications on daily basis.     Morphine Itching    Nsaids (non-steroidal anti-inflammatory drug)      Kidney Disease    Tylenol [acetaminophen]      Hx of liver disease     Past Medical History:   Diagnosis Date    Alcohol withdrawal 5/1/2022    Alcoholic cirrhosis of liver     Alcoholic ketoacidosis 6/6/2021    Arthritis     ATN (acute tubular necrosis)     CHF (congestive heart failure)     Diverticulitis 01/2020    Esophageal varices     GERD (gastroesophageal reflux disease)     GI bleed     Hip arthritis     Left    Hypertension     Liver cirrhosis     Macrocytic anemia     Pulmonary embolism 08/2018    Unprovoked DVT.  Stop Coumadin due to GIB    Thrombocytopenia      Past Surgical  History:   Procedure Laterality Date    ANKLE SURGERY      BLOCK, NERVE, PERIPHERAL Left 06/24/2022    Procedure: Left Hip femoral-obturator accessory nerve block;  Surgeon: Robbin De La Garza DO;  Location: [facility] OR;  Service: Pain Management;  Laterality: Left;    COLON SURGERY  2007    COLONOSCOPY  09/05/2019    [facility]    COLONOSCOPY N/A 02/17/2021    Procedure: COLONOSCOPY;  Surgeon: Renea Billingsley MD;  Location: [facility];  Service: Endoscopy;  Laterality: N/A;    COLONOSCOPY N/A 02/13/2023    Procedure: COLONOSCOPY;  Surgeon: Rudy Orellana MD;  [facility]  Service: Endoscopy;  Laterality: N/A;  cirrhosis-labs done on 1/11/23  instr portal-GT  No answer for precall- [place]    COLONOSCOPY N/A 03/10/2023    Procedure: COLONOSCOPY;  Surgeon: Rudy Orellana MD;  Location: [facility] ([address] [place] [address]);  Service: Endoscopy;  Laterality: N/A;  inst via poral per pt request    COLONOSCOPY W/ BIOPSIES  02/17/2021    ECHOCARDIOGRAM,TRANSESOPHAGEAL N/A 02/10/2025    Procedure: Transesophageal echo (ALLAN) intra-procedure log documentation;  Surgeon: Yunior Abreu MD;  Location: [facility] CATH/EP LAB;  Service: Cardiology;  Laterality: N/A;    ESOPHAGOGASTRODUODENOSCOPY N/A 07/26/2019    Procedure: EGD (ESOPHAGOGASTRODUODENOSCOPY);  Surgeon: Brian Trivedi MD;  Location: [facility];  Service: Endoscopy;  Laterality: N/A;    ESOPHAGOGASTRODUODENOSCOPY N/A 04/08/2020    Procedure: EGD (ESOPHAGOGASTRODUODENOSCOPY);  Surgeon: Donn Acuna MD;  Location: [facility];  Service: Endoscopy;  Laterality: N/A;    ESOPHAGOGASTRODUODENOSCOPY N/A 01/03/2021    Procedure: EGD (ESOPHAGOGASTRODUODENOSCOPY)- coffee ground emesis, hx varices;  Surgeon: Steve Chairez MD;  Location: [facility];  Service: Endoscopy;  Laterality: N/A;    ESOPHAGOGASTRODUODENOSCOPY N/A 05/03/2021    Procedure: EGD (ESOPHAGOGASTRODUODENOSCOPY);  Surgeon: Jeanmarie Ngo MD;  Location: [facility];  Service: Endoscopy;  Laterality: N/A;     ESOPHAGOGASTRODUODENOSCOPY N/A 07/19/2021    Procedure: ESOPHAGOGASTRODUODENOSCOPY (EGD);  Surgeon: Claudio Medeiros MD;  Location: [facility];  Service: Endoscopy;  Laterality: N/A;    ESOPHAGOGASTRODUODENOSCOPY  12/20/2021    ESOPHAGOGASTRODUODENOSCOPY N/A 12/20/2021    Procedure: EGD (ESOPHAGOGASTRODUODENOSCOPY);  Surgeon: Ajay Jackson MD;  Location: [facility];  Service: Endoscopy;  Laterality: N/A;    ESOPHAGOGASTRODUODENOSCOPY N/A 05/03/2022    Procedure: EGD (ESOPHAGOGASTRODUODENOSCOPY);  Surgeon: Brian Trivedi MD;  Location: [facility];  Service: Endoscopy;  Laterality: N/A;    ESOPHAGOGASTRODUODENOSCOPY N/A 07/07/2022    Procedure: EGD (ESOPHAGOGASTRODUODENOSCOPY);  Surgeon: Ajay Jackson MD;  Location: [facility];  Service: Endoscopy;  Laterality: N/A;    ESOPHAGOGASTRODUODENOSCOPY N/A 11/29/2022    Procedure: EGD (ESOPHAGOGASTRODUODENOSCOPY);  Surgeon: Edouard Maynard MD;  Location: [address]);  Service: Endoscopy;  Laterality: N/A;    ESOPHAGOGASTRODUODENOSCOPY N/A 04/28/2023    Procedure: EGD (ESOPHAGOGASTRODUODENOSCOPY);  Surgeon: Caesar Dickens MD;  [facility]: [facility];  Service: Endoscopy;  Laterality: N/A;    ESOPHAGOGASTRODUODENOSCOPY N/A 03/28/2024    Procedure: EGD (ESOPHAGOGASTRODUODENOSCOPY);  Surgeon: Jeanmarie Ngo MD;  [facility]: [facility];  Service: Endoscopy;  Laterality: N/A;    FRACTURE SURGERY  6/96    HERNIA REPAIR Left     Inguinal    INJECTION OF JOINT Right 09/28/2023    Procedure: RT Hip Injection;  Surgeon: Robbin De La Garza DO;  Location: [facility]IN [facility]EMENT;  Service: Pain Management;  Laterality: Right;  oral - 20 mins    INJECTION OF JOINT Right 10/08/2024    Procedure: Right hip injection;  Surgeon: Robbin De La Garza DO;  Location: [facility];  Service: Pain Management;  Laterality: Right;  No sed no AC    UPPER GASTROINTESTINAL ENDOSCOPY N/A 07/07/2022     Family History   Problem Relation Name Age of Onset     Hypertension Mother ya perdomo     Breast cancer Mother ya perdomo     Hypertension Father Irvin Diaz Sr     Prostate cancer Father Irvin Diaz Sr     Bladder Cancer Father Irvin Diaz Sr      Social History     Tobacco Use    Smoking status: Former     Current packs/day: 0.00     Types: Cigarettes     Quit date:      Years since quittin.1    Smokeless tobacco: Never    Tobacco comments:     quit 20 years ago   Substance Use Topics    Alcohol use: Not Currently     Comment: Quit drinking 21    Drug use: Not Currently     Review of Systems   All other systems reviewed and are negative.      Physical Exam     Initial Vitals [25 2153]   BP Pulse Resp Temp SpO2   134/71 81 16 98.7 °F (37.1 °C) 99 %      MAP       --         Physical Exam    Nursing note and vitals reviewed.  Constitutional: He appears well-developed and well-nourished.   HENT:   Head: Normocephalic and atraumatic.   Eyes: Right eye exhibits no discharge. Left eye exhibits no discharge.   Neck: No tracheal deviation present.   Cardiovascular:  Normal rate, regular rhythm and intact distal pulses.           Pulmonary/Chest: Breath sounds normal. No stridor. No respiratory distress.   Abdominal: Abdomen is soft. Bowel sounds are normal. There is no abdominal tenderness.   Musculoskeletal:         General: Edema present.      Comments: Left leg is swollen from mid shin down, there is some warmth, no crepitus no significant tenderness to palpation normal passive and active range of motion of left ankle, good pulses     Neurological: He is alert and oriented to person, place, and time.   Skin: Skin is warm and dry.         ED Course   Critical Care    Date/Time: 2025 9:45 PM    Performed by: Chris Segal MD  Authorized by: Chris Segal MD  Direct patient critical care time: 5 minutes  Additional history critical care time: 5 minutes  Ordering / reviewing critical care time: 5  minutes  Documentation critical care time: 5 minutes  Consulting other physicians critical care time: 5 minutes  Total critical care time (exclusive of procedural time) : 25 minutes  Critical care time was exclusive of separately billable procedures and treating other patients and teaching time.  Critical care was necessary to treat or prevent imminent or life-threatening deterioration of the following conditions: bacteremia.  Critical care was time spent personally by me on the following activities: development of treatment plan with patient or surrogate, discussions with consultants, interpretation of cardiac output measurements, evaluation of patient's response to treatment, examination of patient, obtaining history from patient or surrogate, ordering and review of laboratory studies, ordering and performing treatments and interventions, ordering and review of radiographic studies, pulse oximetry, re-evaluation of patient's condition and review of old charts.        Labs Reviewed   CBC W/ AUTO DIFFERENTIAL - Abnormal       Result Value    WBC 9.64      RBC 3.16 (*)     Hemoglobin 10.6 (*)     Hematocrit 32.3 (*)      (*)     MCH 33.5 (*)     MCHC 32.8      RDW 16.5 (*)     Platelets 140 (*)     MPV 9.8      Immature Granulocytes 0.6 (*)     Gran # (ANC) 6.4      Immature Grans (Abs) 0.06 (*)     Lymph # 1.9      Mono # 1.1 (*)     Eos # 0.1      Baso # 0.04      nRBC 0      Gran % 66.5      Lymph % 19.7      Mono % 11.7      Eosinophil % 1.1      Basophil % 0.4      Differential Method Automated     COMPREHENSIVE METABOLIC PANEL - Abnormal    Sodium 133 (*)     Potassium 3.8      Chloride 103      CO2 23      Glucose 96      BUN 23 (*)     Creatinine 2.0 (*)     Calcium 9.1      Total Protein 8.7 (*)     Albumin 3.1 (*)     Total Bilirubin 4.7 (*)     Alkaline Phosphatase 109      AST 39      ALT 29      eGFR 39.9 (*)     Anion Gap 7 (*)    URINALYSIS, REFLEX TO URINE CULTURE - Abnormal    Specimen UA  Urine, Clean Catch      Color, UA Yellow      Appearance, UA Clear      pH, UA 6.0      Specific Gravity, UA 1.030      Protein, UA Trace (*)     Glucose, UA Negative      Ketones, UA Negative      Bilirubin (UA) 1+ (*)     Occult Blood UA 2+ (*)     Nitrite, UA Negative      Urobilinogen, UA >=8.0 (*)     Leukocytes, UA Trace (*)     Narrative:     Specimen Source->Urine   PROCALCITONIN - Abnormal    Procalcitonin 0.760 (*)    URINALYSIS MICROSCOPIC - Abnormal    RBC, UA 14 (*)     WBC, UA 15 (*)     Squam Epithel, UA 3      Microscopic Comment SEE COMMENT      Narrative:     Specimen Source->Urine   ISTAT CREATININE - Abnormal    POC Creatinine 2.1 (*)     Sample VENOUS     ISTAT LACTATE    POC Lactate 0.98      Sample VENOUS          ECG Results              EKG 12-lead (Final result)        Collection Time Result Time QRS Duration OHS QTC Calculation    02/07/25 00:42:49 02/08/25 18:16:04 106 479                     Final result by Interface, Lab In [facility] (02/08/25 18:16:13)                   Narrative:    Test Reason : Z13.6,    Vent. Rate :  70 BPM     Atrial Rate :  70 BPM     P-R Int : 148 ms          QRS Dur : 106 ms      QT Int : 444 ms       P-R-T Axes :  57  69  16 degrees    QTcB Int : 479 ms    Normal sinus rhythm  Possible Left atrial enlargement  Minimal voltage criteria for LVH, may be normal variant ( Sokolow-Coleman )  Borderline Abnormal ECG  No previous ECGs available  Confirmed by Yunior Abreu ([id number]) on 2/8/2025 6:16:02 PM    Referred By: AAAREFERRAL SELF           Confirmed By: Yunior Abreu                                     EKG 12-lead (Final result)  Result time 02/10/25 12:05:44      Final result by Unknown User (02/10/25 12:05:44)                                      Imaging Results              MRI Ankle Without Contrast Left (Final result)  Result time 02/07/25 15:43:56      Final result by Sin Beavers MD (02/07/25 15:43:56)                   Impression:      1. Negative for  osteomyelitis about the left ankle.  2. Extensive subcutaneous edema about the left ankle.  3. Prominent heterogeneous increased T2 signal about extensor hallucis longus tendon, with tendon appearing intact throughout its visualized course.  The finding suggests tenosynovitis, either reactive/inflammatory in nature or, in the appropriate clinical setting, due to underlying infection.      Electronically signed by: Sin Beavers  Date:    02/07/2025  Time:    15:43               Narrative:    EXAMINATION:  MRI ANKLE WITHOUT CONTRAST LEFT    CLINICAL HISTORY:  Septic arthritis suspected, ankle, xray done;Osteomyelitis suspected, ankle, xray done;Soft tissue infection suspected, ankle, xray done;    TECHNIQUE:  Left ankle MRI without IV contrast.    COMPARISON:  Left ankle CT 02/07/2025, left foot radiographs 01/30/2025    FINDINGS:  Susceptibility artifact related to surgical screws coursing through medial malleolus is present.    Bone marrow signal is normal.  No confluent low T1 bone marrow signal alteration to suggest osteomyelitis.  Physiologic amount of fluid lies in tibiotalar and subtalar joints.  No articular cartilage defect or osteochondral lesion.    Visualized components of the deltoid ligament complex are intact.  Lateral left ankle ligaments are intact.  Lisfranc ligament is partially visualized and intact.    Medial flexor and lateral peroneal tendons are normal.  The Achilles tendon is normal.  Trace fluid lies in retrocalcaneal bursa.    Tibialis anterior and extensor digitorum tendons are normal.  There is prominent heterogeneous increased T2 signal about the extensor hallucis longus tendon which appears intact.    Extensive subcutaneous edema affects the left foot, ankle, and distal left calf.                                       CT Ankle (Including Hindfoot) Without Contrast Left (Final result)  Result time 02/07/25 07:49:07   Procedure changed from CT Ankle (Including Hindfoot) With Contrast  Left     Final result by Michelet Gandhi MD (02/07/25 07:49:07)                   Impression:      1. Diffuse nonspecific left lower extremity soft tissue edema.  No focal drainable fluid collection is identified.  2. Postoperative changes.  3. Mild arthritic change as above.  4. If osteomyelitis is a strong clinical concern, follow-up MR could be utilized.      Electronically signed by: Michelet Gandhi  Date:    02/07/2025  Time:    07:49               Narrative:    EXAMINATION:  CT ANKLE (INCLUDING HINDFOOT) WITHOUT CONTRAST LEFT    CLINICAL HISTORY:  Osteomyelitis, ankle;    COMPARISON:  Left foot radiographs, January 30, 2025    FINDINGS:  Thin-section axial images were obtained, with reformatted imaging in the sagittal and coronal planes.    There is no CT evidence of acute fracture, dislocation, or acute osseous destructive process.  There has been previous fixation of medial malleolus fracture.  Mild midfoot osteoarthritic changes are noted, with mild arthritic changes at the 1st metatarsophalangeal joint.  No pathologic joint fluid accumulation is identified.    There is diffuse soft tissue swelling of the left lower leg, ankle, and foot, most pronounced over the medial and lateral malleoli.  No focal drainable fluid collection is identified.                                       X-Ray Chest AP Portable (Final result)  Result time 02/07/25 00:20:45      Final result by Edouard Chavarria MD (02/07/25 00:20:45)                   Impression:      As above.      Electronically signed by: Edouard Chavarria MD  Date:    02/07/2025  Time:    00:20               Narrative:    EXAMINATION:  XR CHEST AP PORTABLE    CLINICAL HISTORY:  Sepsis;    TECHNIQUE:  Single frontal view of the chest was performed.    COMPARISON:  02/04/2025    FINDINGS:  There is enlargement of the cardiomediastinal silhouette, similar to prior examination.  There is central vascular congestion.  There is mild increased interstitial  attenuation which may represent mild interstitial edema.  Asymmetric opacification of the left lung base appears to correspond to prominent mediastinal fat a prior CT exam.  No definite new large confluent airspace consolidation appreciated.  No evidence of pneumothorax.  Osseous structures appear intact.                                       Medications   cefTRIAXone injection 1 g (1 g Intravenous Given 2/7/25 0248)   traMADoL tablet 50 mg (50 mg Oral Given 2/7/25 7334)   cefTRIAXone injection 1 g (1 g Intravenous Given 2/7/25 0915)   iohexoL (OMNIPAQUE 350) injection 100 mL (100 mLs Intravenous Given 2/8/25 9369)   0.9% NaCl infusion ( Intravenous New Bag 2/10/25 0605)     Medical Decision Making  Presents after they told him to come back because he had bacteria in his blood.  He reports Ms. Symptoms besides this subacute leg swelling have improved.  He is hypertensive.  He got a dose of ceftriaxone with his urologist today.  My suspicion is he has bacteremia from his urine test his urine culture also grew staph aureus.  I scanned his ankle looking for a fluid collection.  I covered him with ceftriaxone which his bacteria is sensitive to.  With his multiple medical comorbidities I think he is reasonable for admission repeat blood cultures and continued monitoring.  He is in agreement with this plan.    I do not appreciate a large loculated fluid collection or gas on my independent interpretation of the CT ankle, admit to hospital medicine for bacteremia.     Amount and/or Complexity of Data Reviewed  External Data Reviewed: notes.     Details: Seen in urology clinic today and given 1 g of IM ceftriaxone  Labs: ordered.  Radiology: ordered and independent interpretation performed. Decision-making details documented in ED Course.  ECG/medicine tests: ordered and independent interpretation performed.     Details: EKG on my independent interpretation normal sinus rhythm no STEMI    Risk  Prescription drug  management.  Decision regarding hospitalization.               ED Course as of 02/20/25 2009 Fri Feb 07, 2025 0017 Chest x-ray on my independent interpretation no pneumothorax left heart border obscured [IC]   0312 Takes tramadol multiple times a day and would like his nighttime tramadol [IC]      ED Course User Index  [IC] Chris Segal MD                           Clinical Impression:  Final diagnoses:  [Z13.6] Screening for cardiovascular condition  [R78.81] Bacteremia  [M79.89] Leg swelling  [R07.9] Chest pain  [R78.81, B95.61] Staphylococcus aureus bacteremia (Primary)          ED Disposition Condition    Admit Stable                Chris Segal MD  02/07/25 0622       Chris Segal MD  02/20/25 2009

## 2025-02-07 NOTE — ASSESSMENT & PLAN NOTE
Pt with left ankle and hindfoot swelling, erythema, and warmth   -continue Rocephin   -follow-up MRI to rule out bony involvement

## 2025-02-07 NOTE — H&P
[facility] - Emergency Dept  Hospital Medicine  History & Physical    Patient Name: Irvin Diaz Jr.  MRN: [id number]  Patient Class: IP- Inpatient  Admission Date: 2/6/2025  Attending Physician: Eric Lewis MD   Primary Care Provider: Edouard Gibson MD         Patient information was obtained from patient, past medical records, and ER records.     Subjective:     Principal Problem:Bacteremia due to Staphylococcus    Chief Complaint:   Chief Complaint   Patient presents with    Called to Return     Pt states he got a call to return to ed.           HPI: Irvin Diaz is a 50 y/o male with PMH of HTN, CKD3, CHF, GERD, alcoholic cirrhosis, diverticulitis, and prostatitis who presented to the ED yesterday with c/o blood in his urine and left ankle swelling/pain. States that he has been following a urologist outpatient for an infection in his urine since October and was treated with antibiotics but the symptoms have returned. Blood cultures from 2/5 showed bacteremia d/t staphylococcus and he was called to come back to ED for bacteremia. He endorses continued left ankle swelling ever since a previous hip fracture but states that it has become progressively worse in the last 1-2 weeks associated with pain and warmth. Denies shortness of breath, chest pain, palpitations, N/V/D. Denies difficulty urinating or incomplete emptying, fever or chills. CT ankle shows soft tissue edema.  Repeat blood cultures were drawn on admission.  The blood cultures from 02/05 0 MSSA.  He was placed on IV Rocephin on admission.   Admitted to hospital medicine for further evaluation and management.  Given resistant bacteria now bacteremia consult has been placed ID.    Past Medical History:   Diagnosis Date    Alcohol withdrawal 5/1/2022    Alcoholic cirrhosis of liver     Alcoholic ketoacidosis 6/6/2021    Arthritis     ATN (acute tubular necrosis)     CHF (congestive heart failure)     Diverticulitis 01/2020     Esophageal varices     GERD (gastroesophageal reflux disease)     GI bleed     Hip arthritis     Left    Hypertension     Liver cirrhosis     Macrocytic anemia     Pulmonary embolism 08/2018    Unprovoked DVT.  Stop Coumadin due to GIB    Thrombocytopenia        Past Surgical History:   Procedure Laterality Date    ANKLE SURGERY      BLOCK, NERVE, PERIPHERAL Left 06/24/2022    Procedure: Left Hip femoral-obturator accessory nerve block;  Surgeon: Robbin De La Garza DO;  Location: [facility];  Service: Pain Management;  Laterality: Left;    COLON SURGERY  2007    COLONOSCOPY  09/05/2019    [facility]    COLONOSCOPY N/A 02/17/2021    Procedure: COLONOSCOPY;  Surgeon: Renea Billingsley MD;  Location: [facility];  Service: Endoscopy;  Laterality: N/A;    COLONOSCOPY N/A 2/13/2023    Procedure: COLONOSCOPY;  Surgeon: Rudy Orellana MD;  Location: [facility] ([address] [place] [address]);  Service: Endoscopy;  Laterality: N/A;  cirrhosis-labs done on 1/11/23  instr portal-GT  No answer for precall- [place]    COLONOSCOPY N/A 3/10/2023    Procedure: COLONOSCOPY;  Surgeon: Rudy Orellana MD;  Location: [facility] ([address] [place] [address]);  Service: Endoscopy;  Laterality: N/A;  inst via poral per pt request    COLONOSCOPY W/ BIOPSIES  02/17/2021    ESOPHAGOGASTRODUODENOSCOPY N/A 07/26/2019    Procedure: EGD (ESOPHAGOGASTRODUODENOSCOPY);  Surgeon: Brian Trivedi MD;  Location: [facility];  Service: Endoscopy;  Laterality: N/A;    ESOPHAGOGASTRODUODENOSCOPY N/A 04/08/2020    Procedure: EGD (ESOPHAGOGASTRODUODENOSCOPY);  Surgeon: Donn Acuna MD;  Location: [facility];  Service: Endoscopy;  Laterality: N/A;    ESOPHAGOGASTRODUODENOSCOPY N/A 01/03/2021    Procedure: EGD (ESOPHAGOGASTRODUODENOSCOPY)- coffee ground emesis, hx varices;  Surgeon: Steve Chairez MD;  Location: [facility];  Service: Endoscopy;  Laterality: N/A;    ESOPHAGOGASTRODUODENOSCOPY N/A 05/03/2021    Procedure: EGD (ESOPHAGOGASTRODUODENOSCOPY);  Surgeon: Jeanmarie Ngo MD;  Location: [facility];  Service:  Endoscopy;  Laterality: N/A;    ESOPHAGOGASTRODUODENOSCOPY N/A 07/19/2021    Procedure: ESOPHAGOGASTRODUODENOSCOPY (EGD);  Surgeon: Claudio Medeiros MD;  Location: [facility];  Service: Endoscopy;  Laterality: N/A;    ESOPHAGOGASTRODUODENOSCOPY  12/20/2021    ESOPHAGOGASTRODUODENOSCOPY N/A 12/20/2021    Procedure: EGD (ESOPHAGOGASTRODUODENOSCOPY);  Surgeon: Ajay Jackson MD;  Location: [facility];  Service: Endoscopy;  Laterality: N/A;    ESOPHAGOGASTRODUODENOSCOPY N/A 05/03/2022    Procedure: EGD (ESOPHAGOGASTRODUODENOSCOPY);  Surgeon: Brian Trivedi MD;  Location: [facility];  Service: Endoscopy;  Laterality: N/A;    ESOPHAGOGASTRODUODENOSCOPY N/A 7/7/2022    Procedure: EGD (ESOPHAGOGASTRODUODENOSCOPY);  Surgeon: Ajay Jackson MD;  Location: [facility];  Service: Endoscopy;  Laterality: N/A;    ESOPHAGOGASTRODUODENOSCOPY N/A 11/29/2022    Procedure: EGD (ESOPHAGOGASTRODUODENOSCOPY);  Surgeon: Edouard Maynard MD;  Location: [address]);  Service: Endoscopy;  Laterality: N/A;    ESOPHAGOGASTRODUODENOSCOPY N/A 4/28/2023    Procedure: EGD (ESOPHAGOGASTRODUODENOSCOPY);  Surgeon: Caesar Dickens MD;  Location: [facility];  Service: Endoscopy;  Laterality: N/A;    ESOPHAGOGASTRODUODENOSCOPY N/A 3/28/2024    Procedure: EGD (ESOPHAGOGASTRODUODENOSCOPY);  Surgeon: Jeanmarie Ngo MD;  Location: [facility];  Service: Endoscopy;  Laterality: N/A;    HERNIA REPAIR Left     Inguinal    INJECTION OF JOINT Right 9/28/2023    Procedure: RT Hip Injection;  Surgeon: Robbin De La Garza DO;  Location: [facility];  Service: Pain Management;  Laterality: Right;  oral - 20 mins    INJECTION OF JOINT Right 10/8/2024    Procedure: Right hip injection;  Surgeon: Robbin De La Garza DO;  Location: [facility];  Service: Pain Management;  Laterality: Right;  No sed no AC    UPPER GASTROINTESTINAL ENDOSCOPY N/A 07/07/2022       Review of patient's allergies indicates:   Allergen Reactions     Ciprofloxacin hcl Hallucinations    Meperidine Hives     Pt medicated w/multiple medications (demerol, protonix, and zofran)  w/i 10 mins time frame prior to developing localized hives near IV site that med was administered. Pt reports he has/takes all other medications on daily basis.     Morphine Itching    Nsaids (non-steroidal anti-inflammatory drug)      Kidney Disease    Tylenol [acetaminophen]      Hx of liver disease       Current Facility-Administered Medications on File Prior to Encounter   Medication    cefTRIAXone injection 1 g     Current Outpatient Medications on File Prior to Encounter   Medication Sig    doxycycline (VIBRAMYCIN) 100 MG Cap Take 1 capsule (100 mg total) by mouth every 12 (twelve) hours. for 7 days    ferrous gluconate (FERATE) 240 (27 FE) MG tablet Take 2 tablets (480 mg total) by mouth 2 (two) times daily with meals.    folic acid (FOLVITE) 1 MG tablet Take 1 tablet (1 mg total) by mouth once daily.    HYDROcodone-acetaminophen (NORCO) 5-325 mg per tablet Take 1 tablet by mouth every 6 (six) hours as needed for Pain.    methocarbamoL (ROBAXIN) 500 MG Tab Take 1 tablet (500 mg total) by mouth 4 (four) times daily. for 10 days    metoprolol tartrate (LOPRESSOR) 50 MG tablet Take 1 tablet (50 mg total) by mouth 2 (two) times daily. Do not take if heart rate is less than 60    nortriptyline (PAMELOR) 25 MG capsule Take 1 capsule (25 mg total) by mouth every evening.    rifAXIMin (XIFAXAN) 550 mg Tab Take 1 tablet (550 mg total) by mouth 2 (two) times daily.    spironolactone (ALDACTONE) 25 MG tablet Take 1 tablet (25 mg total) by mouth once daily.    tadalafiL (CIALIS) 20 MG Tab Take 1 tablet (20 mg total) by mouth daily as needed (erectile dysfunction).    thiamine 100 MG tablet Take 1 tablet (100 mg total) by mouth once daily.    traMADoL (ULTRAM) 50 mg tablet Take 1 tablet (50 mg total) by mouth 4 (four) times daily as needed for Pain.    amoxicillin-clavulanate 875-125mg  (AUGMENTIN) 875-125 mg per tablet Take 1 tablet by mouth 2 (two) times daily. for 10 days    FARXIGA 10 mg tablet Take 1 tablet (10 mg total) by mouth once daily. (Patient not taking: Reported on 2025)    papaverine 30 mg/mL injection Add phentolamine 10 mgs/ml., add PGE1  100 micrograms.    sulfamethoxazole-trimethoprim 400-80mg (BACTRIM,SEPTRA) 400-80 mg per tablet Take 1 tablet by mouth 2 (two) times daily. (Patient not taking: Reported on 2025)    valsartan (DIOVAN) 40 MG tablet Take 1 tablet (40 mg total) by mouth once daily. (Patient not taking: Reported on 2025)    [DISCONTINUED] allopurinoL (ZYLOPRIM) 100 MG tablet Take 0.5 tablets (50 mg total) by mouth once daily. for 6 days     Family History       Problem Relation (Age of Onset)    Bladder Cancer Father    Breast cancer Mother    Hypertension Mother, Father    Prostate cancer Father          Tobacco Use    Smoking status: Former     Current packs/day: 0.00     Types: Cigarettes     Quit date:      Years since quittin.1    Smokeless tobacco: Never    Tobacco comments:     quit 20 years ago   Substance and Sexual Activity    Alcohol use: Not Currently     Comment: Quit drinking 21    Drug use: Not Currently    Sexual activity: Yes     Comment: occ     Review of Systems   Constitutional:  Negative for chills and fever.   Respiratory:  Negative for chest tightness and shortness of breath.    Cardiovascular:  Positive for leg swelling. Negative for chest pain.   Gastrointestinal:  Negative for nausea and vomiting.   Genitourinary:  Positive for hematuria. Negative for difficulty urinating and dysuria.   Skin:  Positive for color change.     Objective:     Vital Signs (Most Recent):  Temp: 98.7 °F (37.1 °C) (25)  Pulse: 78 (25 1026)  Resp: 16 (25 0334)  BP: (!) 145/69 (25 1026)  SpO2: 97 % (25 102) Vital Signs (24h Range):  Temp:  [98.7 °F (37.1 °C)] 98.7 °F (37.1 °C)  Pulse:  [65-81] 78  Resp:   [16] 16  SpO2:  [96 %-100 %] 97 %  BP: (106-181)/(53-75) 145/69     Weight: 108.9 kg (240 lb)  Body mass index is 32.55 kg/m².     Physical Exam  Constitutional:       Appearance: Normal appearance.   HENT:      Head: Normocephalic.   Eyes:      General: Scleral icterus present.   Cardiovascular:      Rate and Rhythm: Normal rate and regular rhythm.      Pulses: Normal pulses.      Heart sounds: Normal heart sounds.   Pulmonary:      Effort: Pulmonary effort is normal.      Breath sounds: Normal breath sounds.   Abdominal:      General: Abdomen is flat. Bowel sounds are normal.      Palpations: Abdomen is soft.   Musculoskeletal:         General: Swelling (left ankle/foot) and tenderness (left ankle/foot) present.      Cervical back: Normal range of motion and neck supple.      Right lower leg: Edema present.      Left lower leg: Edema present.   Skin:     Capillary Refill: Capillary refill takes less than 2 seconds.      Findings: Erythema (dorsum of LEFT foot with erythema and warmth to touch) present.   Neurological:      General: No focal deficit present.      Mental Status: He is alert and oriented to person, place, and time. Mental status is at baseline.   Psychiatric:         Mood and Affect: Mood normal.         Behavior: Behavior normal.         Thought Content: Thought content normal.         Judgment: Judgment normal.                Significant Labs: All pertinent labs within the past 24 hours have been reviewed.  Bilirubin:   Recent Labs   Lab 01/30/25  0701 02/04/25  1138 02/07/25  0402   BILITOT 3.9* 3.6* 4.7*     Blood Culture:   Recent Labs   Lab 02/07/25  0233 02/07/25  0234   LABBLOO No Growth to date No Growth to date     CBC:   Recent Labs   Lab 02/07/25  0402   WBC 9.64   HGB 10.6*   HCT 32.3*   *     CMP:   Recent Labs   Lab 02/07/25  0402   *   K 3.8      CO2 23   GLU 96   BUN 23*   CREATININE 2.0*   CALCIUM 9.1   PROT 8.7*   ALBUMIN 3.1*   BILITOT 4.7*   ALKPHOS 109   AST  "39   ALT 29   ANIONGAP 7*       Urine Studies:   Recent Labs   Lab 02/07/25  0041   COLORU Yellow   APPEARANCEUA Clear   PHUR 6.0   SPECGRAV 1.030   PROTEINUA Trace*   GLUCUA Negative   KETONESU Negative   BILIRUBINUA 1+*   OCCULTUA 2+*   NITRITE Negative   UROBILINOGEN >=8.0*   LEUKOCYTESUR Trace*   RBCUA 14*   WBCUA 15*   SQUAMEPITHEL 3       Significant Imaging: I have reviewed all pertinent imaging results/findings within the past 24 hours.  Assessment/Plan:     * Bacteremia due to Staphylococcus  - Pt with MSSA on blood cultures from 02/05.  Repeat blood cultures drawn 2/7   - Pt placed on IV Rocephin which we will continue  - urine cultures growing the same felt to be related to prostatitis.  Recently seen by Urology in clinic-she comments on continued presence of staph in urine despite multiple courses of treatment and was considering a prolonged and multidrug course prior to admission.  - given recurrent infection, now bacteremia-we will consult with ID to ensure appropriate timing and choice of antibiotic therapy  - ECHO ordered to rule out vegetation   -will obtain MRI of the left ankle/hindfoot to rule out any osteomyelitis        Cellulitis of foot, left  Pt with left ankle and hindfoot swelling, erythema, and warmth   -continue Rocephin   -follow-up MRI to rule out bony involvement        Acute on chronic prostatitis  -urine culture growing MSSA  -continue the Rocephin   -id consultation   -will need continued follow-up with Urology as an outpatient ensure resolution      Congestive heart failure, unspecified HF chronicity, unspecified heart failure type  Patient has Diastolic (HFpEF) heart failure that is Chronic. On presentation their CHF was well compensated. Most recent BNP and echo results are listed below.  No results for input(s): "BNP" in the last 72 hours.  Latest ECHO  Results for orders placed during the hospital encounter of 03/27/24    Echo Saline Bubble? No    Interpretation Summary    " Left Ventricle: The left ventricle is normal in size. Normal wall thickness. There is concentric hypertrophy. There is normal systolic function. Biplane (2D) method of discs ejection fraction is 57%. There is normal diastolic function.    Right Ventricle: Normal right ventricular cavity size. Wall thickness is normal. Right ventricle wall motion  is normal. Systolic function is normal.    Left Atrium: Left atrium is mildly dilated.    Mitral Valve: There is mild to moderate regurgitation with a centrally directed jet.    Pulmonary Artery: The estimated pulmonary artery systolic pressure is 19 mmHg.    IVC/SVC: Normal venous pressure at 3 mmHg.    Current Heart Failure Medications       Plan  - Monitor strict I&Os and daily weights.    - Place on telemetry  - Low sodium diet  - appears euvolemic  - new echocardiogram ordered, pending results         Hyperbilirubinemia  Pt with hx of alcoholic cirrhosis  - followed by Neurology and liver transplant-has been denied for liver transplant due to timing of sobriety   -encouraged continued ETOH cessation  -bilirubin level stable with elevated baseline    CKD (chronic kidney disease) stage 3, GFR 30-59 ml/min  Creatine stable for now. BMP reviewed- noted Estimated Creatinine Clearance: 56.3 mL/min (A) (based on SCr of 2 mg/dL (H)). according to latest data. Based on current GFR, CKD stage is stage 3 - GFR 30-59.  Monitor UOP and serial BMP and adjust therapy as needed. Renally dose meds. Avoid nephrotoxic medications and procedures.    Essential hypertension  Patient's blood pressure range in the last 24 hours was: BP  Min: 106/53  Max: 181/75.The patient's inpatient anti-hypertensive regimen is listed below:  Current Antihypertensives   Metoprolol    Plan  - BP is controlled, no changes needed to their regimen      VTE Risk Mitigation (From admission, onward)           Ordered     enoxaparin injection 40 mg  Daily         02/07/25 0824     IP VTE HIGH RISK PATIENT  Once          02/07/25 0824     Place sequential compression device  Until discontinued         02/07/25 0824                               Pharmacist Renal Dose Adjustment Note    Irvin Diaz Jr. is a 49 y.o. male being treated with the medication Famotidine IV 20 mg    Patient Data:    Vital Signs (Most Recent):  Temp: 98.7 °F (37.1 °C) (02/06/25 2153)  Pulse: 76 (02/07/25 0530)  Resp: 16 (02/07/25 0334)  BP: (!) 106/53 (02/07/25 0530)  SpO2: 96 % (02/07/25 0530) Vital Signs (72h Range):  Temp:  [98.7 °F (37.1 °C)]   Pulse:  [65-81]   Resp:  [16]   BP: (106-181)/(53-75)   SpO2:  [96 %-100 %]      Recent Labs   Lab 02/04/25  1138 02/07/25  0402   CREATININE 1.8* 2.0*     Serum creatinine: 2 mg/dL (H) 02/07/25 0402  Estimated creatinine clearance: 56.3 mL/min (A)    Medication:Famotidine IV 20 mg twice daily will be changed to Famotidine IV 20 mg daily per protocol.    Pharmacist's Name: Roberto Moran  Pharmacist's Extension: [phone]      Purnima Valenzuela NP  Department of Hospital Medicine  [facility] - Emergency Dept

## 2025-02-07 NOTE — PROGRESS NOTES
Pharmacist Renal Dose Adjustment Note    Irvin Diaz Jr. is a 49 y.o. male being treated with the medication Famotidine IV 20 mg    Patient Data:    Vital Signs (Most Recent):  Temp: 98.7 °F (37.1 °C) (02/06/25 2153)  Pulse: 76 (02/07/25 0530)  Resp: 16 (02/07/25 0334)  BP: (!) 106/53 (02/07/25 0530)  SpO2: 96 % (02/07/25 0530) Vital Signs (72h Range):  Temp:  [98.7 °F (37.1 °C)]   Pulse:  [65-81]   Resp:  [16]   BP: (106-181)/(53-75)   SpO2:  [96 %-100 %]      Recent Labs   Lab 02/04/25  1138 02/07/25  0402   CREATININE 1.8* 2.0*     Serum creatinine: 2 mg/dL (H) 02/07/25 0402  Estimated creatinine clearance: 56.3 mL/min (A)    Medication:Famotidine IV 20 mg twice daily will be changed to Famotidine IV 20 mg daily per protocol.    Pharmacist's Name: Roberto Moran  Pharmacist's Extension: [phone]

## 2025-02-07 NOTE — ASSESSMENT & PLAN NOTE
"Patient has Diastolic (HFpEF) heart failure that is Chronic. On presentation their CHF was well compensated. Most recent BNP and echo results are listed below.  No results for input(s): "BNP" in the last 72 hours.  Latest ECHO  Results for orders placed during the hospital encounter of 03/27/24    Echo Saline Bubble? No    Interpretation Summary    Left Ventricle: The left ventricle is normal in size. Normal wall thickness. There is concentric hypertrophy. There is normal systolic function. Biplane (2D) method of discs ejection fraction is 57%. There is normal diastolic function.    Right Ventricle: Normal right ventricular cavity size. Wall thickness is normal. Right ventricle wall motion  is normal. Systolic function is normal.    Left Atrium: Left atrium is mildly dilated.    Mitral Valve: There is mild to moderate regurgitation with a centrally directed jet.    Pulmonary Artery: The estimated pulmonary artery systolic pressure is 19 mmHg.    IVC/SVC: Normal venous pressure at 3 mmHg.    Current Heart Failure Medications       Plan  - Monitor strict I&Os and daily weights.    - Place on telemetry  - Low sodium diet  - appears euvolemic  - new echocardiogram ordered, pending results       "

## 2025-02-07 NOTE — HPI
Irvin Diaz is a 48 y/o male with PMH of HTN, CKD3, CHF, GERD, alcoholic cirrhosis, diverticulitis, and prostatitis who presented to the ED yesterday with c/o blood in his urine and left ankle swelling/pain. States that he has been following a urologist outpatient for an infection in his urine since October and was treated with antibiotics but the symptoms have returned. Blood cultures from 2/5 showed bacteremia d/t staphylococcus and he was called to come back to ED for bacteremia. He endorses continued left ankle swelling ever since a previous hip fracture but states that it has become progressively worse in the last 1-2 weeks associated with pain and warmth. Denies shortness of breath, chest pain, palpitations, N/V/D. Denies difficulty urinating or incomplete emptying, fever or chills. CT ankle shows soft tissue edema.  Repeat blood cultures were drawn on admission.  The blood cultures from 02/05 0 MSSA.  He was placed on IV Rocephin on admission.   Admitted to hospital medicine for further evaluation and management.  Given resistant bacteria now bacteremia consult has been placed ID.

## 2025-02-07 NOTE — ASSESSMENT & PLAN NOTE
Pt with hx of alcoholic cirrhosis  - followed by Neurology and liver transplant-has been denied for liver transplant due to timing of sobriety   -encouraged continued ETOH cessation  -bilirubin level stable with elevated baseline

## 2025-02-08 ENCOUNTER — CLINICAL SUPPORT (OUTPATIENT)
Dept: CARDIOLOGY | Facility: HOSPITAL | Age: 50
End: 2025-02-08
Attending: STUDENT IN AN ORGANIZED HEALTH CARE EDUCATION/TRAINING PROGRAM
Payer: MEDICARE

## 2025-02-08 VITALS — WEIGHT: 240.94 LBS | HEIGHT: 72 IN | BODY MASS INDEX: 32.63 KG/M2

## 2025-02-08 LAB
ALBUMIN SERPL BCP-MCNC: 2.3 G/DL (ref 3.5–5.2)
ALP SERPL-CCNC: 82 U/L (ref 55–135)
ALT SERPL W/O P-5'-P-CCNC: 19 U/L (ref 10–44)
ANION GAP SERPL CALC-SCNC: 3 MMOL/L (ref 8–16)
AORTIC ROOT ANNULUS: 3 CM
AORTIC VALVE CUSP SEPERATION: 2.3 CM
APICAL FOUR CHAMBER EJECTION FRACTION: 52 %
APICAL TWO CHAMBER EJECTION FRACTION: 67 %
ASCENDING AORTA: 2.9 CM
AST SERPL-CCNC: 29 U/L (ref 10–40)
AV INDEX (PROSTH): 0.95
AV MEAN GRADIENT: 7 MMHG
AV PEAK GRADIENT: 12 MMHG
AV VALVE AREA BY VELOCITY RATIO: 3 CM²
AV VALVE AREA: 3 CM²
AV VELOCITY RATIO: 0.94
BASOPHILS # BLD AUTO: 0.02 K/UL (ref 0–0.2)
BASOPHILS NFR BLD: 0.4 % (ref 0–1.9)
BILIRUB SERPL-MCNC: 2.7 MG/DL (ref 0.1–1)
BSA FOR ECHO PROCEDURE: 2.36 M2
BUN SERPL-MCNC: 19 MG/DL (ref 6–20)
CALCIUM SERPL-MCNC: 8.1 MG/DL (ref 8.7–10.5)
CHLORIDE SERPL-SCNC: 109 MMOL/L (ref 95–110)
CO2 SERPL-SCNC: 24 MMOL/L (ref 23–29)
CREAT SERPL-MCNC: 1.8 MG/DL (ref 0.5–1.4)
CRP SERPL-MCNC: 1.3 MG/DL
CV ECHO LV RWT: 0.44 CM
DIFFERENTIAL METHOD BLD: ABNORMAL
DOP CALC AO PEAK VEL: 1.7 M/S
DOP CALC AO VTI: 38.3 CM
DOP CALC LVOT AREA: 3.1 CM2
DOP CALC LVOT DIAMETER: 2 CM
DOP CALC LVOT PEAK VEL: 1.6 M/S
DOP CALC LVOT STROKE VOLUME: 114.6 CM3
DOP CALC MV VTI: 33.3 CM
DOP CALCLVOT PEAK VEL VTI: 36.5 CM
E WAVE DECELERATION TIME: 208 MSEC
E/A RATIO: 1.73
E/E' RATIO: 10 M/S
ECHO LV POSTERIOR WALL: 1.2 CM (ref 0.6–1.1)
EOSINOPHIL # BLD AUTO: 0.1 K/UL (ref 0–0.5)
EOSINOPHIL NFR BLD: 1.1 % (ref 0–8)
ERYTHROCYTE [DISTWIDTH] IN BLOOD BY AUTOMATED COUNT: 16.4 % (ref 11.5–14.5)
ERYTHROCYTE [SEDIMENTATION RATE] IN BLOOD BY WESTERGREN METHOD: 36 MM/HR (ref 0–10)
EST. GFR  (NO RACE VARIABLE): 45.3 ML/MIN/1.73 M^2
FRACTIONAL SHORTENING: 40 % (ref 28–44)
GLOBAL LONGITUIDAL STRAIN: -21.4 %
GLUCOSE SERPL-MCNC: 99 MG/DL (ref 70–110)
HCT VFR BLD AUTO: 26.4 % (ref 40–54)
HGB BLD-MCNC: 8.8 G/DL (ref 14–18)
IMM GRANULOCYTES # BLD AUTO: 0.03 K/UL (ref 0–0.04)
IMM GRANULOCYTES NFR BLD AUTO: 0.5 % (ref 0–0.5)
INTERVENTRICULAR SEPTUM: 0.9 CM (ref 0.6–1.1)
IVC DIAMETER: 1.5 CM
LEFT ATRIUM AREA SYSTOLIC (APICAL 4 CHAMBER): 24.7 CM2
LEFT ATRIUM SIZE: 4.8 CM
LEFT INTERNAL DIMENSION IN SYSTOLE: 3.3 CM (ref 2.1–4)
LEFT VENTRICLE DIASTOLIC VOLUME INDEX: 63.64 ML/M2
LEFT VENTRICLE DIASTOLIC VOLUME: 147 ML
LEFT VENTRICLE END DIASTOLIC VOLUME APICAL 2 CHAMBER: 126 ML
LEFT VENTRICLE END DIASTOLIC VOLUME APICAL 4 CHAMBER: 116 ML
LEFT VENTRICLE END SYSTOLIC VOLUME APICAL 4 CHAMBER: 66.9 ML
LEFT VENTRICLE MASS INDEX: 98.4 G/M2
LEFT VENTRICLE SYSTOLIC VOLUME INDEX: 19.1 ML/M2
LEFT VENTRICLE SYSTOLIC VOLUME: 44.1 ML
LEFT VENTRICULAR INTERNAL DIMENSION IN DIASTOLE: 5.5 CM (ref 3.5–6)
LEFT VENTRICULAR MASS: 227.4 G
LV LATERAL E/E' RATIO: 9.1 M/S
LV SEPTAL E/E' RATIO: 10.7 M/S
LVED V (TEICH): 147 ML
LVES V (TEICH): 44.1 ML
LVOT MG: 6 MMHG
LVOT MV: 1.12 CM/S
LYMPHOCYTES # BLD AUTO: 1.4 K/UL (ref 1–4.8)
LYMPHOCYTES NFR BLD: 24.9 % (ref 18–48)
MAGNESIUM SERPL-MCNC: 2 MG/DL (ref 1.6–2.6)
MCH RBC QN AUTO: 34 PG (ref 27–31)
MCHC RBC AUTO-ENTMCNC: 33.3 G/DL (ref 32–36)
MCV RBC AUTO: 102 FL (ref 82–98)
MONOCYTES # BLD AUTO: 0.7 K/UL (ref 0.3–1)
MONOCYTES NFR BLD: 12.4 % (ref 4–15)
MV MEAN GRADIENT: 2 MMHG
MV PEAK A VEL: 0.74 M/S
MV PEAK E VEL: 1.28 M/S
MV PEAK GRADIENT: 6 MMHG
MV STENOSIS PRESSURE HALF TIME: 108 MS
MV VALVE AREA BY CONTINUITY EQUATION: 3.44 CM2
MV VALVE AREA P 1/2 METHOD: 2.04 CM2
NEUTROPHILS # BLD AUTO: 3.4 K/UL (ref 1.8–7.7)
NEUTROPHILS NFR BLD: 60.7 % (ref 38–73)
NRBC BLD-RTO: 0 /100 WBC
OHS CV RV/LV RATIO: 0.49 CM
OHS LV EJECTION FRACTION SIMPSONS BIPLANE MOD: 57 %
OHS QRS DURATION: 106 MS
OHS QTC CALCULATION: 479 MS
PISA TR MAX VEL: 2.8 M/S
PLATELET # BLD AUTO: 102 K/UL (ref 150–450)
PMV BLD AUTO: 9.4 FL (ref 9.2–12.9)
POTASSIUM SERPL-SCNC: 4.2 MMOL/L (ref 3.5–5.1)
PROT SERPL-MCNC: 6.5 G/DL (ref 6–8.4)
PV MV: 0.86 M/S
PV PEAK GRADIENT: 7 MMHG
PV PEAK VELOCITY: 1.28 M/S
RA PRESSURE ESTIMATED: 3 MMHG
RBC # BLD AUTO: 2.59 M/UL (ref 4.6–6.2)
RIGHT ATRIUM VOLUME AREA LENGTH APICAL 4 CHAMBER: 62.6 ML
RIGHT VENTRICLE DIASTOLIC BASEL DIMENSION: 2.7 CM
RIGHT VENTRICULAR END-DIASTOLIC DIMENSION: 2.7 CM
RV TB RVSP: 6 MMHG
RV TISSUE DOPPLER FREE WALL SYSTOLIC VELOCITY 1 (APICAL 4 CHAMBER VIEW): 16.5 CM/S
SODIUM SERPL-SCNC: 136 MMOL/L (ref 136–145)
TDI LATERAL: 0.14 M/S
TDI SEPTAL: 0.12 M/S
TDI: 0.13 M/S
TR MAX PG: 31 MMHG
TRICUSPID ANNULAR PLANE SYSTOLIC EXCURSION: 2.8 CM
TV REST PULMONARY ARTERY PRESSURE: 34 MMHG
WBC # BLD AUTO: 5.55 K/UL (ref 3.9–12.7)
Z-SCORE OF LEFT VENTRICULAR DIMENSION IN END DIASTOLE: -5.02
Z-SCORE OF LEFT VENTRICULAR DIMENSION IN END SYSTOLE: -4

## 2025-02-08 PROCEDURE — 99233 SBSQ HOSP IP/OBS HIGH 50: CPT | Mod: ,,, | Performed by: INTERNAL MEDICINE

## 2025-02-08 PROCEDURE — 25000003 PHARM REV CODE 250: Performed by: NURSE PRACTITIONER

## 2025-02-08 PROCEDURE — 25000003 PHARM REV CODE 250: Performed by: INTERNAL MEDICINE

## 2025-02-08 PROCEDURE — 93356 MYOCRD STRAIN IMG SPCKL TRCK: CPT

## 2025-02-08 PROCEDURE — 63600175 PHARM REV CODE 636 W HCPCS: Performed by: INTERNAL MEDICINE

## 2025-02-08 PROCEDURE — 93306 TTE W/DOPPLER COMPLETE: CPT | Mod: 26,,, | Performed by: INTERNAL MEDICINE

## 2025-02-08 PROCEDURE — 86140 C-REACTIVE PROTEIN: CPT | Performed by: NURSE PRACTITIONER

## 2025-02-08 PROCEDURE — 93356 MYOCRD STRAIN IMG SPCKL TRCK: CPT | Mod: ,,, | Performed by: INTERNAL MEDICINE

## 2025-02-08 PROCEDURE — 63600175 PHARM REV CODE 636 W HCPCS: Performed by: NURSE PRACTITIONER

## 2025-02-08 PROCEDURE — 12000002 HC ACUTE/MED SURGE SEMI-PRIVATE ROOM

## 2025-02-08 PROCEDURE — 36415 COLL VENOUS BLD VENIPUNCTURE: CPT | Performed by: INTERNAL MEDICINE

## 2025-02-08 PROCEDURE — 25500020 PHARM REV CODE 255: Performed by: HOSPITALIST

## 2025-02-08 PROCEDURE — 85651 RBC SED RATE NONAUTOMATED: CPT | Performed by: INTERNAL MEDICINE

## 2025-02-08 PROCEDURE — 85025 COMPLETE CBC W/AUTO DIFF WBC: CPT | Performed by: NURSE PRACTITIONER

## 2025-02-08 PROCEDURE — 80053 COMPREHEN METABOLIC PANEL: CPT | Performed by: NURSE PRACTITIONER

## 2025-02-08 PROCEDURE — 83735 ASSAY OF MAGNESIUM: CPT | Performed by: NURSE PRACTITIONER

## 2025-02-08 RX ORDER — SODIUM CHLORIDE 9 MG/ML
INJECTION, SOLUTION INTRAVENOUS CONTINUOUS
Status: DISCONTINUED | OUTPATIENT
Start: 2025-02-08 | End: 2025-02-09

## 2025-02-08 RX ORDER — PREDNISONE 20 MG/1
20 TABLET ORAL DAILY
Status: DISCONTINUED | OUTPATIENT
Start: 2025-02-08 | End: 2025-02-11 | Stop reason: HOSPADM

## 2025-02-08 RX ORDER — FAMOTIDINE 10 MG/ML
20 INJECTION INTRAVENOUS 2 TIMES DAILY
Status: DISCONTINUED | OUTPATIENT
Start: 2025-02-08 | End: 2025-02-11 | Stop reason: HOSPADM

## 2025-02-08 RX ADMIN — PREDNISONE 20 MG: 20 TABLET ORAL at 11:02

## 2025-02-08 RX ADMIN — SPIRONOLACTONE 25 MG: 25 TABLET, FILM COATED ORAL at 08:02

## 2025-02-08 RX ADMIN — METHOCARBAMOL 500 MG: 500 TABLET ORAL at 09:02

## 2025-02-08 RX ADMIN — FAMOTIDINE 20 MG: 10 INJECTION, SOLUTION INTRAVENOUS at 09:02

## 2025-02-08 RX ADMIN — METOPROLOL TARTRATE 50 MG: 50 TABLET, FILM COATED ORAL at 08:02

## 2025-02-08 RX ADMIN — TRAMADOL HYDROCHLORIDE 50 MG: 50 TABLET, COATED ORAL at 09:02

## 2025-02-08 RX ADMIN — SODIUM CHLORIDE: 9 INJECTION, SOLUTION INTRAVENOUS at 01:02

## 2025-02-08 RX ADMIN — METHOCARBAMOL 500 MG: 500 TABLET ORAL at 08:02

## 2025-02-08 RX ADMIN — TRAMADOL HYDROCHLORIDE 50 MG: 50 TABLET, COATED ORAL at 08:02

## 2025-02-08 RX ADMIN — MUPIROCIN 1 G: 20 OINTMENT TOPICAL at 08:02

## 2025-02-08 RX ADMIN — FAMOTIDINE 20 MG: 10 INJECTION, SOLUTION INTRAVENOUS at 08:02

## 2025-02-08 RX ADMIN — OXACILLIN 12 G: 2 INJECTION, POWDER, FOR SOLUTION INTRAMUSCULAR; INTRAVENOUS at 08:02

## 2025-02-08 RX ADMIN — ENOXAPARIN SODIUM 40 MG: 40 INJECTION SUBCUTANEOUS at 04:02

## 2025-02-08 RX ADMIN — IOHEXOL 100 ML: 350 INJECTION, SOLUTION INTRAVENOUS at 05:02

## 2025-02-08 NOTE — ASSESSMENT & PLAN NOTE
- Pt with MSSA on blood cultures from 02/05.  Repeat blood cultures drawn 2/7   - Pt placed on IV Rocephin on admit adjusted to oxacillin infusion.  - urine cultures growing the same felt to be related to prostatitis.  Recently seen by Urology in clinic-she comments on continued presence of staph in urine despite multiple courses of treatment and was considering a prolonged and multidrug course prior to admission.  - given recurrent infection, now bacteremia-we will consult with ID to ensure appropriate timing and choice of antibiotic therapy  - ECHO ordered to rule out vegetation   -MRI negative for fluid collection or Osteo.

## 2025-02-08 NOTE — ASSESSMENT & PLAN NOTE
-urine culture growing MSSA  -adjusted to oxacillin infusion per ID  -ID consultation. Additional imaging modalities to be determined.  -will need continued follow-up with Urology as an outpatient ensure resolution     Is This A New Presentation, Or A Follow-Up?: Skin Lesions How Severe Is Your Skin Lesion?: mild Has Your Skin Lesion Been Treated?: not been treated

## 2025-02-08 NOTE — HOSPITAL COURSE
Pt was monitored closely during his stay.  He was maintained on IV Abx.  Id was consulted.  An was done to the left ankle rule out osteomyelitis-this was negative.  Repeat blood cultures were drawn on 02/07 with no growth to date.  An echo was ordered evaluate for valvular vegetations which was negative. He CT of the abdomen and pelvis with IV contrast to rule out prostate abscess.  Due to his CKD, he was hydrated pre and postprocedure.  CT was negative for any abscess.  He did have enlarged prostate.  He was continued on IV oxacillin per ID recommendations.  Cardiology was consulted for ALLAN to ensure no valvular vegetations-this was negative.  ID followed closely. They made outpatient IV antibiotic recommendations including transitioned to Ancef.  Pt remained stable here in the hospital.  He was D/C to complete a very short course of steroids for the gout which has helped tremendously.  The PICC line was in place for outpatient Ancef infusion for 4 weeks.  Return precautions were discussed.  Pt verbalized understanding and agreement with discharge plan    He was seen on day of discharge and deemed appropriate for discharge.

## 2025-02-08 NOTE — PROGRESS NOTES
[facility]   Department of Infectious Disease  Progress Note        PATIENT NAME: Irvin Diaz Jr.  MRN: [id number]  TODAY'S DATE: 02/08/2025  ADMIT DATE: 2/6/2025  LOS: 1 days    CHIEF COMPLAINT: Called to Return (Pt states he got a call to return to ed. /)      PRINCIPLE PROBLEM: Bacteremia due to Staphylococcus    INTERVAL HISTORY      02/08/2025:   Unable to obtain CT abdomen and pelvis with contrast because of baseline CKD.  Repeat urine and blood cultures so far negative.  Left Foot and ankle swelling and redness also better.    Antibiotics (From admission, onward)      Start     Stop Route Frequency Ordered    02/07/25 2000  oxacillin 12 g in  mL CONTINUOUS INFUSION         -- IV Every 24 hours (non-standard times) 02/07/25 1833    02/07/25 0930  mupirocin 2 % ointment         02/12/25 0859 Nasl 2 times daily 02/07/25 0825          Antifungals (From admission, onward)      None           Antivirals (From admission, onward)      None            ASSESSMENT and PLAN      High-grade MSSA bacteremia in a patient with recurrent MSSA bacteriuria.  Follow repeat blood cultures and TTE report.  Continue oxacillin.     2. Recurrent MSSA bacteriuria with hematuria.  Need to rule out prostate abscess.  Noncontrast CT abdomen and pelvis with no prostate abnormality.  E GFR better today.  Discussed with radiologist.  We will order CT abdomen and pelvis with IV contrast.     3. Left ankle and foot cellulitis.  Most likely related to MSSA.  Gout maybe contributed.  Noncontrast CT without abscess.  MRI shows tenosynovitis but no abscess or osteomyelitis.  Antibiotics as above.  Gout management as per hospitalist.     4. Compensated alcoholic cirrhosis.  Immune to HAV and HBV.  HCV screen negative on multiple locations.  Management as per hospitalist.       5. Microcytic anemia.  Most likely related to cirrhosis.    6. CKD     RECOMMENDATIONS:      CT abdomen and pelvis with IV contrast   Continue  oxacillin.    Please send Epic secure chat with any questions.  Discussed with radiologist.  Discussed with hospitalist.      SUBJECTIVE    Irvin Diaz Jr. is a 49 y.o. male with history of alcoholic cirrhosis with portal hypertension, hypertension, GERD.  In an episode of hematuria in October 20, 2024.  Urine culture that time apparently grew MSSA.  Treated with antibiotics with resolution.  Appears he was lost to follow-up to Urology Service.     Started feeling unwell about 2 weeks ago around the snowstorm time.  Then started having hematuria again and later developed left foot swelling and pain.  Seen by his PCP on 01/29/2025.  He was diagnosed with cellulitis left worse than prescribed Keflex.  Presents to the emergency room with a nice day 1/30/25 for evaluation.  Uric acid level was high.  X-ray foot is unremarkable SR 4 screws in the medial malleolus area.  He was given steroid and discharged on allopurinol.  Staphylococcus aureus.  Back in the ER again on 02/04/2025 with left foot pain.  Stabilized and discharged once again.     Hematuria persisted. Left foot swelling also positive but did improve.  So Urology CMP 02/06/2025.  Called back to the ER because blood culture 02/04/2025 grew.  Noncontrast CT abdomen and pelvis shows cirrhosis but no other acute findings.  Noncontrast CT left ankle and foot shows cellulitis and no abscess.  MRI left ankle and foot documented tenosynovitis but no abscess or osteomyelitis.  ID asked to assist with his care.        Antibiotic history:    Keflex:.  01/29/2025-02/06/2025  Ceftriaxone: 2/6/25-2/7/25  Oxacillin: 2/7/25-     Microbiology:    Urine culture 03/27/2024, 10/22/2024: MSSA   Urine culture 01/29/2025, 02/04/2025: MSSA   Blood cultures on 04/25: MSSA 2/2   Urine culture 02/07/2025: NGTD  Blood culture 02/07/2025: NGTD    Review of Systems  Negative except as stated above in Interval History     OBJECTIVE   Temp:  [98.2 °F (36.8 °C)-98.7 °F (37.1 °C)] 98.2  °F (36.8 °C)  Pulse:  [66-86] 68  Resp:  [15-20] 20  SpO2:  [97 %-100 %] 100 %  BP: (120-162)/(54-83) 143/78  Temp:  [98.2 °F (36.8 °C)-98.7 °F (37.1 °C)]   Temp: 98.2 °F (36.8 °C) (02/08/25 0747)  Pulse: 68 (02/08/25 0747)  Resp: 20 (02/08/25 0900)  BP: (!) 143/78 (02/08/25 0747)  SpO2: 100 % (02/08/25 0747)    Intake/Output Summary (Last 24 hours) at 2/8/2025 0942  Last data filed at 2/8/2025 0855  Gross per 24 hour   Intake 700 ml   Output 1250 ml   Net -550 ml       Physical Exam  General:  Humalog quietly in bed in no acute distress   Left lower history of ventricular edema of leg ankle and foot with erythema ankle and foot.  Normal range of motion of ankle and toes.  Evidence of ankle surgery   CVS: S1-S2 heard, no murmurs appreciated   Respiratory: Clear to auscultation   Abdomen: Full, soft, nontender, no palpable organomegaly   Skin: No rash appreciated   CNS: No focal deficits   Musculoskeletal: No other joint or bony resorption septal described on the left lower extremity  Psych: Good mood, normal affect.      VAD:  PIV  ISOLATION:  None     Wounds:  None    Significant Labs: All pertinent labs within the past 24 hours have been reviewed.    CBC LAST 7 DAYS  Recent Labs   Lab 02/04/25  1030 02/07/25  0402 02/08/25  0454   WBC 9.96 9.64 5.55   RBC 3.06* 3.16* 2.59*   HGB 10.4* 10.6* 8.8*   HCT 31.5* 32.3* 26.4*   * 102* 102*   MCH 34.0* 33.5* 34.0*   MCHC 33.0 32.8 33.3   RDW 16.9* 16.5* 16.4*   PLT 75* 140* 102*   MPV 12.2 9.8 9.4   GRAN 70.3  7.0 66.5  6.4 60.7  3.4   LYMPH 17.2*  1.7 19.7  1.9 24.9  1.4   MONO 10.3  1.0 11.7  1.1* 12.4  0.7   BASO 0.03 0.04 0.02   NRBC 0 0 0       CHEMISTRY LAST 7 DAYS  Recent Labs   Lab 02/04/25  1138 02/07/25  0402 02/08/25  0454   * 133* 136   K 3.9 3.8 4.2    103 109   CO2 26 23 24   ANIONGAP 2* 7* 3*   BUN 25* 23* 19   CREATININE 1.8* 2.0* 1.8*    96 99   CALCIUM 8.2* 9.1 8.1*   MG 2.3  --  2.0   ALBUMIN 2.7* 3.1* 2.3*   PROT 7.0  "8.7* 6.5   ALKPHOS 93 109 82   ALT 30 29 19   AST 41* 39 29   BILITOT 3.6* 4.7* 2.7*       Estimated Creatinine Clearance: 62.7 mL/min (A) (based on SCr of 1.8 mg/dL (H)).    INFLAMMATORY/PROCAL  LAST 7 DAYS  Recent Labs   Lab 02/04/25  1419 02/07/25  0402 02/08/25  0454   PROCAL 0.267 0.760*  --    CRP  --   --  1.30*     No results found for: "ESR"  CRP   Date Value Ref Range Status   02/08/2025 1.30 (H) <1.00 mg/dL Final     Comment:     CRP-Normal Application expected values:   <1.0        mg/dL   Normal Range  1.0 - 5.0  mg/dL   Indicates mild inflammation  5.0 - 10.0 mg/dL   Indicates severe inflammation  >10.0        mg/dL   Represents serious processes and   frequently         indicates the presence of a bacterial   infection.      10/27/2020 0.60 0.00 - 1.00 mg/dL Final       PRIOR  MICROBIOLOGY:    Susceptibility data from last 90 days.  Collected Specimen Info Organism Ceftriaxone Clindamycin Erythromycin Oxacillin Penicillin Tetracycline Trimeth/Sulfa Vancomycin   02/04/25 Urine Staphylococcus aureus  S    S  R  S  S  S   02/04/25 Blood from Peripheral, Hand, Left Staphylococcus aureus  S  R  R  S  R  S  S  S   02/04/25 Blood from Peripheral, Hand, Right Staphylococcus aureus           01/29/25 Urine Staphylococcus aureus     S    S        LAST 7 DAYS MICROBIOLOGY   Microbiology Results (last 7 days)       Procedure Component Value Units Date/Time    Urine culture [[id number]] Collected: 02/07/25 0041    Order Status: Completed Specimen: Urine Updated: 02/08/25 0714     Urine Culture, Routine No growth to date    Narrative:      Specimen Source->Urine    Blood culture x two cultures. Draw prior to antibiotics. [[id number]] Collected: 02/07/25 0233    Order Status: Completed Specimen: Blood Updated: 02/08/25 0432     Blood Culture, Routine No Growth to date      No Growth to date    Narrative:      Aerobic and anaerobic  Collection has been rescheduled by 2MB at 02/06/2025 23:30 Reason:   Patient " unavailable  Collection has been rescheduled by 2MB at 02/06/2025 23:30 Reason:   Patient unavailable    Blood culture x two cultures. Draw prior to antibiotics. [[id number]] Collected: 02/07/25 0234    Order Status: Completed Specimen: Blood Updated: 02/08/25 0432     Blood Culture, Routine No Growth to date      No Growth to date    Narrative:      Aerobic and anaerobic  Collection has been rescheduled by 2MB at 02/06/2025 23:30 Reason:   Patient unavailable  Collection has been rescheduled by 2MB at 02/06/2025 23:30 Reason:   Patient unavailable          CURRENT/PREVIOUS VISIT EKG  Results for orders placed or performed during the hospital encounter of 02/06/25   EKG 12-lead    Collection Time: 02/07/25 12:42 AM   Result Value Ref Range    QRS Duration 106 ms    OHS QTC Calculation 479 ms    Narrative    Test Reason : Z13.6,    Vent. Rate :  70 BPM     Atrial Rate :  70 BPM     P-R Int : 148 ms          QRS Dur : 106 ms      QT Int : 444 ms       P-R-T Axes :  57  69  16 degrees    QTcB Int : 479 ms    Normal sinus rhythm  Possible Left atrial enlargement  Minimal voltage criteria for LVH, may be normal variant ( Sokolow-Coleman )  Borderline Abnormal ECG  No previous ECGs available    Referred By: AAAREFERRAL SELF           Confirmed By:      Significant Imaging: I have reviewed all relevant and available imaging results/findings within the past 24 hours.    I spent a total of 55 minutes on the day of the visit.This includes face to face time and non-face to face time preparing to see the patient (eg, review of tests), obtaining and/or reviewing separately obtained history, documenting clinical information in the electronic or other health record, independently interpreting results and communicating results to the patient/family/caregiver, or care coordinator.    Telly Knott MD  Date of Service: 02/08/2025      This note was created using [employer] voice recognition software that occasionally misinterpreted  phrases or words.

## 2025-02-08 NOTE — PROGRESS NOTES
[facility] Medicine  Progress Note    Patient Name: Irvin Diaz Jr.  MRN: [id number]  Patient Class: IP- Inpatient   Admission Date: 2/6/2025  Length of Stay: 1 days  Attending Physician: Fili Mast MD  Primary Care Provider: Edouard Gibson MD        Subjective     Principal Problem:Bacteremia due to Staphylococcus        HPI:  Irvin Diaz is a 50 y/o male with PMH of HTN, CKD3, CHF, GERD, alcoholic cirrhosis, diverticulitis, and prostatitis who presented to the ED yesterday with c/o blood in his urine and left ankle swelling/pain. States that he has been following a urologist outpatient for an infection in his urine since October and was treated with antibiotics but the symptoms have returned. Blood cultures from 2/5 showed bacteremia d/t staphylococcus and he was called to come back to ED for bacteremia. He endorses continued left ankle swelling ever since a previous hip fracture but states that it has become progressively worse in the last 1-2 weeks associated with pain and warmth. Denies shortness of breath, chest pain, palpitations, N/V/D. Denies difficulty urinating or incomplete emptying, fever or chills. CT ankle shows soft tissue edema.  Repeat blood cultures were drawn on admission.  The blood cultures from 02/05 0 MSSA.  He was placed on IV Rocephin on admission.   Admitted to hospital medicine for further evaluation and management.  Given resistant bacteria now bacteremia consult has been placed ID.    Overview/Hospital Course:  Pt was monitored closely during his stay.  He was maintained on IV Abx.  Id was consulted.  An was done to the left ankle rule out osteomyelitis-this was negative.  Repeat blood cultures were drawn on 02/07 with no growth to date.  An echo was ordered evaluate for valvular vegetations.    Interval History:  Pt seen and examined.  Feeling well.  Feels like foot is a little better.  Discussed with ID.  They are okay with oral steroids  for gout component.  Id to discuss with Radiology best imaging approach to rule out prostate abscess in the setting of CKD contrast administration not ideal.  Continue the oxacillin infusion.    Review of Systems   Constitutional:  Negative for chills and fever.   Respiratory:  Negative for cough and shortness of breath.    Cardiovascular:  Positive for leg swelling. Negative for chest pain.   Gastrointestinal:  Negative for diarrhea, nausea and vomiting.   Genitourinary:  Negative for difficulty urinating and dysuria.     Objective:     Vital Signs (Most Recent):  Temp: 98.2 °F (36.8 °C) (02/08/25 1101)  Pulse: 61 (02/08/25 1101)  Resp: 20 (02/08/25 0900)  BP: 138/73 (02/08/25 1101)  SpO2: 100 % (02/08/25 1101) Vital Signs (24h Range):  Temp:  [98.2 °F (36.8 °C)-98.7 °F (37.1 °C)] 98.2 °F (36.8 °C)  Pulse:  [61-86] 61  Resp:  [15-20] 20  SpO2:  [97 %-100 %] 100 %  BP: (120-162)/(54-83) 138/73     Weight: 109.3 kg (240 lb 14.4 oz)  Body mass index is 32.67 kg/m².    Intake/Output Summary (Last 24 hours) at 2/8/2025 1122  Last data filed at 2/8/2025 1010  Gross per 24 hour   Intake 700 ml   Output 1450 ml   Net -750 ml         Physical Exam  Vitals and nursing note reviewed.   Constitutional:       Appearance: Normal appearance.   Cardiovascular:      Rate and Rhythm: Normal rate and regular rhythm.   Pulmonary:      Effort: Pulmonary effort is normal.      Breath sounds: Normal breath sounds.   Abdominal:      General: Abdomen is flat. Bowel sounds are normal.      Palpations: Abdomen is soft.   Musculoskeletal:      Cervical back: Normal range of motion and neck supple.      Left lower leg: Edema (LLE ankle and dorsum of the foot with swelling, decreased erythema and warmth to touch) present.   Skin:     General: Skin is warm and dry.      Capillary Refill: Capillary refill takes less than 2 seconds.   Neurological:      General: No focal deficit present.      Mental Status: He is alert and oriented to person,  place, and time. Mental status is at baseline.             Significant Labs: All pertinent labs within the past 24 hours have been reviewed.  CBC:   Recent Labs   Lab 02/07/25  0402 02/08/25  0454   WBC 9.64 5.55   HGB 10.6* 8.8*   HCT 32.3* 26.4*   * 102*     CMP:   Recent Labs   Lab 02/07/25  0402 02/08/25  0454   * 136   K 3.8 4.2    109   CO2 23 24   GLU 96 99   BUN 23* 19   CREATININE 2.0* 1.8*   CALCIUM 9.1 8.1*   PROT 8.7* 6.5   ALBUMIN 3.1* 2.3*   BILITOT 4.7* 2.7*   ALKPHOS 109 82   AST 39 29   ALT 29 19   ANIONGAP 7* 3*       Significant Imaging: I have reviewed all pertinent imaging results/findings within the past 24 hours.    Assessment and Plan     * Bacteremia due to Staphylococcus  - Pt with MSSA on blood cultures from 02/05.  Repeat blood cultures drawn 2/7   - Pt placed on IV Rocephin on admit adjusted to oxacillin infusion.  - urine cultures growing the same felt to be related to prostatitis.  Recently seen by Urology in clinic-she comments on continued presence of staph in urine despite multiple courses of treatment and was considering a prolonged and multidrug course prior to admission.  - given recurrent infection, now bacteremia-we will consult with ID to ensure appropriate timing and choice of antibiotic therapy  - ECHO ordered to rule out vegetation   -MRI negative for fluid collection or Osteo.        Cellulitis of foot, left  Pt with left ankle and hindfoot swelling, erythema, and warmth   - +/- gout component. Add steroids          Acute on chronic prostatitis  -urine culture growing MSSA  -adjusted to oxacillin infusion per ID  -ID consultation. Additional imaging modalities to be determined.  -will need continued follow-up with Urology as an outpatient ensure resolution      Congestive heart failure, unspecified HF chronicity, unspecified heart failure type  Patient has Diastolic (HFpEF) heart failure that is Chronic. On presentation their CHF was well compensated.  "Most recent BNP and echo results are listed below.  No results for input(s): "BNP" in the last 72 hours.  Latest ECHO  Results for orders placed during the hospital encounter of 03/27/24    Echo Saline Bubble? No    Interpretation Summary    Left Ventricle: The left ventricle is normal in size. Normal wall thickness. There is concentric hypertrophy. There is normal systolic function. Biplane (2D) method of discs ejection fraction is 57%. There is normal diastolic function.    Right Ventricle: Normal right ventricular cavity size. Wall thickness is normal. Right ventricle wall motion  is normal. Systolic function is normal.    Left Atrium: Left atrium is mildly dilated.    Mitral Valve: There is mild to moderate regurgitation with a centrally directed jet.    Pulmonary Artery: The estimated pulmonary artery systolic pressure is 19 mmHg.    IVC/SVC: Normal venous pressure at 3 mmHg.    Current Heart Failure Medications       Plan  - Monitor strict I&Os and daily weights.    - Place on telemetry  - Low sodium diet  - appears euvolemic  - new echocardiogram ordered, pending results         Hyperbilirubinemia  Pt with hx of alcoholic cirrhosis  - followed by Neurology and liver transplant-has been denied for liver transplant due to timing of sobriety   -encouraged continued ETOH cessation  -bilirubin level stable with elevated baseline    CKD (chronic kidney disease) stage 3, GFR 30-59 ml/min  Creatine stable for now. BMP reviewed- noted Estimated Creatinine Clearance: 56.3 mL/min (A) (based on SCr of 2 mg/dL (H)). according to latest data. Based on current GFR, CKD stage is stage 3 - GFR 30-59.  Monitor UOP and serial BMP and adjust therapy as needed. Renally dose meds. Avoid nephrotoxic medications and procedures.    Essential hypertension  Patient's blood pressure range in the last 24 hours was: BP  Min: 106/53  Max: 181/75.The patient's inpatient anti-hypertensive regimen is listed below:  Current Antihypertensives   " Metoprolol    Plan  - BP is controlled, no changes needed to their regimen      VTE Risk Mitigation (From admission, onward)           Ordered     enoxaparin injection 40 mg  Daily         02/07/25 0824     IP VTE HIGH RISK PATIENT  Once         02/07/25 0824     Place sequential compression device  Until discontinued         02/07/25 0824                    Discharge Planning   RADHA: 2/10/2025     Code Status: Full Code   Medical Readiness for Discharge Date:   Discharge Plan A: Home with family                        Purnima Valenzuela NP  Department of Hospital Medicine   [facility]

## 2025-02-08 NOTE — PROGRESS NOTES
Pharmacist Renal Adjustment Note    Irvin Diaz Jr. is a 49 y.o. male being treated with Famotidine.     Patient Data:    Vital Signs (Most Recent):  Temp: 98.4 °F (36.9 °C) (02/08/25 0349)  Pulse: 70 (02/08/25 0349)  Resp: 17 (02/07/25 2040)  BP: (!) 148/71 (02/08/25 0349)  SpO2: 100 % (02/08/25 0500) Vital Signs (72h Range):  Temp:  [98.3 °F (36.8 °C)-98.7 °F (37.1 °C)]   Pulse:  [65-86]   Resp:  [16-18]   BP: (106-181)/(53-83)   SpO2:  [96 %-100 %]      Recent Labs   Lab 02/04/25  1138 02/07/25  0402   CREATININE 1.8* 2.0*     Serum creatinine: 2 mg/dL (H) 02/07/25 0402  Estimated creatinine clearance: 56.4 mL/min (A)    Famotidine 20 mg IV every 24 hours will be changed to Famotidine 20 mg IV every 12 hours due to CrCl > 50 mL/min per Renal Dose Adjustment protocol.     Pharmacist's Name: Jillian Silva  Pharmacist's Extension: [phone]

## 2025-02-08 NOTE — ASSESSMENT & PLAN NOTE
Pt with left ankle and hindfoot swelling, erythema, and warmth   - +/- gout component. Add steroids

## 2025-02-08 NOTE — SUBJECTIVE & OBJECTIVE
Interval History:  Pt seen and examined.  Feeling well.  Feels like foot is a little better.  Discussed with ID.  They are okay with oral steroids for gout component.  Id to discuss with Radiology best imaging approach to rule out prostate abscess in the setting of CKD contrast administration not ideal.  Continue the oxacillin infusion.    Review of Systems   Constitutional:  Negative for chills and fever.   Respiratory:  Negative for cough and shortness of breath.    Cardiovascular:  Positive for leg swelling. Negative for chest pain.   Gastrointestinal:  Negative for diarrhea, nausea and vomiting.   Genitourinary:  Negative for difficulty urinating and dysuria.     Objective:     Vital Signs (Most Recent):  Temp: 98.2 °F (36.8 °C) (02/08/25 1101)  Pulse: 61 (02/08/25 1101)  Resp: 20 (02/08/25 0900)  BP: 138/73 (02/08/25 1101)  SpO2: 100 % (02/08/25 1101) Vital Signs (24h Range):  Temp:  [98.2 °F (36.8 °C)-98.7 °F (37.1 °C)] 98.2 °F (36.8 °C)  Pulse:  [61-86] 61  Resp:  [15-20] 20  SpO2:  [97 %-100 %] 100 %  BP: (120-162)/(54-83) 138/73     Weight: 109.3 kg (240 lb 14.4 oz)  Body mass index is 32.67 kg/m².    Intake/Output Summary (Last 24 hours) at 2/8/2025 1122  Last data filed at 2/8/2025 1010  Gross per 24 hour   Intake 700 ml   Output 1450 ml   Net -750 ml         Physical Exam  Vitals and nursing note reviewed.   Constitutional:       Appearance: Normal appearance.   Cardiovascular:      Rate and Rhythm: Normal rate and regular rhythm.   Pulmonary:      Effort: Pulmonary effort is normal.      Breath sounds: Normal breath sounds.   Abdominal:      General: Abdomen is flat. Bowel sounds are normal.      Palpations: Abdomen is soft.   Musculoskeletal:      Cervical back: Normal range of motion and neck supple.      Left lower leg: Edema (LLE ankle and dorsum of the foot with swelling, decreased erythema and warmth to touch) present.   Skin:     General: Skin is warm and dry.      Capillary Refill: Capillary  refill takes less than 2 seconds.   Neurological:      General: No focal deficit present.      Mental Status: He is alert and oriented to person, place, and time. Mental status is at baseline.             Significant Labs: All pertinent labs within the past 24 hours have been reviewed.  CBC:   Recent Labs   Lab 02/07/25  0402 02/08/25  0454   WBC 9.64 5.55   HGB 10.6* 8.8*   HCT 32.3* 26.4*   * 102*     CMP:   Recent Labs   Lab 02/07/25  0402 02/08/25  0454   * 136   K 3.8 4.2    109   CO2 23 24   GLU 96 99   BUN 23* 19   CREATININE 2.0* 1.8*   CALCIUM 9.1 8.1*   PROT 8.7* 6.5   ALBUMIN 3.1* 2.3*   BILITOT 4.7* 2.7*   ALKPHOS 109 82   AST 39 29   ALT 29 19   ANIONGAP 7* 3*       Significant Imaging: I have reviewed all pertinent imaging results/findings within the past 24 hours.

## 2025-02-09 LAB
ALBUMIN SERPL BCP-MCNC: 2.4 G/DL (ref 3.5–5.2)
ALP SERPL-CCNC: 91 U/L (ref 55–135)
ALT SERPL W/O P-5'-P-CCNC: 19 U/L (ref 10–44)
ANION GAP SERPL CALC-SCNC: 5 MMOL/L (ref 8–16)
AST SERPL-CCNC: 29 U/L (ref 10–40)
BACTERIA UR CULT: NO GROWTH
BASOPHILS # BLD AUTO: 0.02 K/UL (ref 0–0.2)
BASOPHILS NFR BLD: 0.2 % (ref 0–1.9)
BILIRUB SERPL-MCNC: 2.4 MG/DL (ref 0.1–1)
BUN SERPL-MCNC: 18 MG/DL (ref 6–20)
CALCIUM SERPL-MCNC: 8.3 MG/DL (ref 8.7–10.5)
CHLORIDE SERPL-SCNC: 109 MMOL/L (ref 95–110)
CO2 SERPL-SCNC: 23 MMOL/L (ref 23–29)
CREAT SERPL-MCNC: 1.7 MG/DL (ref 0.5–1.4)
DIFFERENTIAL METHOD BLD: ABNORMAL
EOSINOPHIL # BLD AUTO: 0 K/UL (ref 0–0.5)
EOSINOPHIL NFR BLD: 0.2 % (ref 0–8)
ERYTHROCYTE [DISTWIDTH] IN BLOOD BY AUTOMATED COUNT: 16.5 % (ref 11.5–14.5)
EST. GFR  (NO RACE VARIABLE): 48.5 ML/MIN/1.73 M^2
GLUCOSE SERPL-MCNC: 132 MG/DL (ref 70–110)
HCT VFR BLD AUTO: 28.2 % (ref 40–54)
HGB BLD-MCNC: 9.2 G/DL (ref 14–18)
IMM GRANULOCYTES # BLD AUTO: 0.07 K/UL (ref 0–0.04)
IMM GRANULOCYTES NFR BLD AUTO: 0.8 % (ref 0–0.5)
INR PPP: 1.3 (ref 0.8–1.2)
LYMPHOCYTES # BLD AUTO: 1.5 K/UL (ref 1–4.8)
LYMPHOCYTES NFR BLD: 16.6 % (ref 18–48)
MAGNESIUM SERPL-MCNC: 2 MG/DL (ref 1.6–2.6)
MCH RBC QN AUTO: 33.7 PG (ref 27–31)
MCHC RBC AUTO-ENTMCNC: 32.6 G/DL (ref 32–36)
MCV RBC AUTO: 103 FL (ref 82–98)
MONOCYTES # BLD AUTO: 0.8 K/UL (ref 0.3–1)
MONOCYTES NFR BLD: 8.6 % (ref 4–15)
NEUTROPHILS # BLD AUTO: 6.7 K/UL (ref 1.8–7.7)
NEUTROPHILS NFR BLD: 73.6 % (ref 38–73)
NRBC BLD-RTO: 0 /100 WBC
PLATELET # BLD AUTO: 95 K/UL (ref 150–450)
PMV BLD AUTO: 9.5 FL (ref 9.2–12.9)
POTASSIUM SERPL-SCNC: 4.4 MMOL/L (ref 3.5–5.1)
PROT SERPL-MCNC: 6.8 G/DL (ref 6–8.4)
PROTHROMBIN TIME: 14.1 SEC (ref 9–12.5)
RBC # BLD AUTO: 2.73 M/UL (ref 4.6–6.2)
SODIUM SERPL-SCNC: 137 MMOL/L (ref 136–145)
WBC # BLD AUTO: 9.04 K/UL (ref 3.9–12.7)

## 2025-02-09 PROCEDURE — 25000003 PHARM REV CODE 250: Performed by: NURSE PRACTITIONER

## 2025-02-09 PROCEDURE — 94760 N-INVAS EAR/PLS OXIMETRY 1: CPT

## 2025-02-09 PROCEDURE — 85610 PROTHROMBIN TIME: CPT

## 2025-02-09 PROCEDURE — 63600175 PHARM REV CODE 636 W HCPCS: Performed by: NURSE PRACTITIONER

## 2025-02-09 PROCEDURE — 85025 COMPLETE CBC W/AUTO DIFF WBC: CPT | Performed by: NURSE PRACTITIONER

## 2025-02-09 PROCEDURE — 36415 COLL VENOUS BLD VENIPUNCTURE: CPT | Performed by: NURSE PRACTITIONER

## 2025-02-09 PROCEDURE — 25000003 PHARM REV CODE 250: Performed by: INTERNAL MEDICINE

## 2025-02-09 PROCEDURE — 63600175 PHARM REV CODE 636 W HCPCS: Performed by: INTERNAL MEDICINE

## 2025-02-09 PROCEDURE — 99900035 HC TECH TIME PER 15 MIN (STAT)

## 2025-02-09 PROCEDURE — 99233 SBSQ HOSP IP/OBS HIGH 50: CPT | Mod: ,,, | Performed by: INTERNAL MEDICINE

## 2025-02-09 PROCEDURE — 80053 COMPREHEN METABOLIC PANEL: CPT | Performed by: NURSE PRACTITIONER

## 2025-02-09 PROCEDURE — 36415 COLL VENOUS BLD VENIPUNCTURE: CPT

## 2025-02-09 PROCEDURE — 83735 ASSAY OF MAGNESIUM: CPT | Performed by: NURSE PRACTITIONER

## 2025-02-09 PROCEDURE — 12000002 HC ACUTE/MED SURGE SEMI-PRIVATE ROOM

## 2025-02-09 RX ORDER — SODIUM CHLORIDE 9 MG/ML
INJECTION, SOLUTION INTRAVENOUS ONCE
Status: COMPLETED | OUTPATIENT
Start: 2025-02-10 | End: 2025-02-10

## 2025-02-09 RX ADMIN — SPIRONOLACTONE 25 MG: 25 TABLET, FILM COATED ORAL at 08:02

## 2025-02-09 RX ADMIN — TRAMADOL HYDROCHLORIDE 50 MG: 50 TABLET, COATED ORAL at 02:02

## 2025-02-09 RX ADMIN — MUPIROCIN 1 G: 20 OINTMENT TOPICAL at 08:02

## 2025-02-09 RX ADMIN — METOPROLOL TARTRATE 50 MG: 50 TABLET, FILM COATED ORAL at 08:02

## 2025-02-09 RX ADMIN — FAMOTIDINE 20 MG: 10 INJECTION, SOLUTION INTRAVENOUS at 08:02

## 2025-02-09 RX ADMIN — FAMOTIDINE 20 MG: 10 INJECTION, SOLUTION INTRAVENOUS at 09:02

## 2025-02-09 RX ADMIN — PREDNISONE 20 MG: 20 TABLET ORAL at 08:02

## 2025-02-09 RX ADMIN — TRAMADOL HYDROCHLORIDE 50 MG: 50 TABLET, COATED ORAL at 08:02

## 2025-02-09 RX ADMIN — METHOCARBAMOL 500 MG: 500 TABLET ORAL at 08:02

## 2025-02-09 RX ADMIN — OXACILLIN 12 G: 2 INJECTION, POWDER, FOR SOLUTION INTRAMUSCULAR; INTRAVENOUS at 09:02

## 2025-02-09 RX ADMIN — ENOXAPARIN SODIUM 40 MG: 40 INJECTION SUBCUTANEOUS at 04:02

## 2025-02-09 NOTE — PROGRESS NOTES
[facility] [place]   Department of Infectious Disease  Progress Note        PATIENT NAME: Irvin Diaz Jr.  MRN: [id number]  TODAY'S DATE: 02/09/2025  ADMIT DATE: 2/6/2025  LOS: 2 days    CHIEF COMPLAINT: Called to Return (Pt states he got a call to return to ed. /)      PRINCIPLE PROBLEM: Bacteremia due to Staphylococcus    INTERVAL HISTORY      02/08/2025:   Unable to obtain CT abdomen and pelvis with contrast because of baseline CKD.  Repeat urine and blood cultures so far negative.  Left Foot and ankle swelling and redness also better.    02/09/2025:  Left foot pain almost resolved.  Swelling and redness also significantly better on steroids.  CT abdomen and pelvis negative for any renal abnormality.  No prostate abscess seen.  Repeat blood and urine cultures remain negative.    Antibiotics (From admission, onward)      Start     Stop Route Frequency Ordered    02/07/25 2000  oxacillin 12 g in  mL CONTINUOUS INFUSION         -- IV Every 24 hours (non-standard times) 02/07/25 1833    02/07/25 0930  mupirocin 2 % ointment         02/12/25 0859 Nasl 2 times daily 02/07/25 0825          Antifungals (From admission, onward)      None           Antivirals (From admission, onward)      None            ASSESSMENT and PLAN      High-grade MSSA bacteremia in a patient with recurrent MSSA bacteriuria.  TTE negative for vegetation.  We will consult Cardiology for ALLAN.      2. Recurrent MSSA bacteriuria with hematuria.  CT abdomen and pelvis with contrast negative for prostate on renal abscess.  We will most likely treat for at least 4 weeks for this indication.         3. Left ankle and foot cellulitis.  Also with gout.  Improved on steroids.  Gout management as per hospitalist. Antibiotics as above.       4. Compensated alcoholic cirrhosis.  Immune to HAV and HBV.  HCV screen negative on multiple locations.  Management as per hospitalist.       5. Macrocytic anemia.  Most likely related to cirrhosis.    6.  CKD     RECOMMENDATIONS:    Consult candidate for ALLAN   Continue oxacillin.  Plan to treat for at least 4-6 weeks for clinical prostatitis.  Extend to 6 weeks if vegetation on ALLAN.    Please send Epic secure chat with any questions.  Discussed with hospitalist.      SUBJECTIVE    Irvin Diaz Jr. is a 49 y.o. male with history of alcoholic cirrhosis with portal hypertension, hypertension, GERD.  In an episode of hematuria in October 20, 2024.  Urine culture that time apparently grew MSSA.  Treated with antibiotics with resolution.  Appears he was lost to follow-up to Urology Service.     Started feeling unwell about 2 weeks ago around the snowstorm time.  Then started having hematuria again and later developed left foot swelling and pain.  Seen by his PCP on 01/29/2025.  He was diagnosed with cellulitis left worse than prescribed Keflex.  Presents to the emergency room with a nice day 1/30/25 for evaluation.  Uric acid level was high.  X-ray foot is unremarkable SR 4 screws in the medial malleolus area.  He was given steroid and discharged on allopurinol.  Staphylococcus aureus.  Back in the ER again on 02/04/2025 with left foot pain.  Stabilized and discharged once again.     Hematuria persisted. Left foot swelling also positive but did improve.  So Urology CMP 02/06/2025.  Called back to the ER because blood culture 02/04/2025 grew.  Noncontrast CT abdomen and pelvis shows cirrhosis but no other acute findings.  Noncontrast CT left ankle and foot shows cellulitis and no abscess.  MRI left ankle and foot documented tenosynovitis but no abscess or osteomyelitis.  ID asked to assist with his care.        Antibiotic history:    Keflex:.  01/29/2025-02/06/2025  Ceftriaxone: 2/6/25-2/7/25  Oxacillin: 2/7/25-     Microbiology:    Urine culture 03/27/2024, 10/22/2024: MSSA   Urine culture 01/29/2025, 02/04/2025: MSSA   Blood cultures on 04/25: MSSA 2/2   Urine culture 02/07/2025: NGTD  Blood culture 02/07/2025:  NGTD    Review of Systems  Negative except as stated above in Interval History     OBJECTIVE   Temp:  [98 °F (36.7 °C)-99.1 °F (37.3 °C)] 98.4 °F (36.9 °C)  Pulse:  [67-86] 71  Resp:  [15-18] 15  SpO2:  [97 %-100 %] 99 %  BP: (115-147)/(62-80) 116/65  Temp:  [98 °F (36.7 °C)-99.1 °F (37.3 °C)]   Temp: 98.4 °F (36.9 °C) (02/09/25 1149)  Pulse: 71 (02/09/25 1325)  Resp: 15 (02/09/25 1325)  BP: 116/65 (02/09/25 1149)  SpO2: 99 % (02/09/25 1325)    Intake/Output Summary (Last 24 hours) at 2/9/2025 1348  Last data filed at 2/9/2025 0831  Gross per 24 hour   Intake 240 ml   Output 800 ml   Net -560 ml       Physical Exam  General:  Humalog quietly in bed in no acute distress   Left lower extremity:  Likely reduced edema of ankle and foot.  Normal range of motion ankle.  Erythema resolved but still with hyperpigmentation.  CVS: S1-S2 heard, no murmurs appreciated   Respiratory: Clear to auscultation   Abdomen: Full, soft, nontender, no palpable organomegaly   Skin: No rash appreciated   CNS: No focal deficits   Musculoskeletal: No other joint or bony resorption septal described on the left lower extremity  Psych: Good mood, normal affect.      VAD:  PIV  ISOLATION:  None     Wounds:  None    Significant Labs: All pertinent labs within the past 24 hours have been reviewed.    CBC LAST 7 DAYS  Recent Labs   Lab 02/04/25  1030 02/07/25  0402 02/08/25  0454 02/09/25  0353   WBC 9.96 9.64 5.55 9.04   RBC 3.06* 3.16* 2.59* 2.73*   HGB 10.4* 10.6* 8.8* 9.2*   HCT 31.5* 32.3* 26.4* 28.2*   * 102* 102* 103*   MCH 34.0* 33.5* 34.0* 33.7*   MCHC 33.0 32.8 33.3 32.6   RDW 16.9* 16.5* 16.4* 16.5*   PLT 75* 140* 102* 95*   MPV 12.2 9.8 9.4 9.5   GRAN 70.3  7.0 66.5  6.4 60.7  3.4 73.6*  6.7   LYMPH 17.2*  1.7 19.7  1.9 24.9  1.4 16.6*  1.5   MONO 10.3  1.0 11.7  1.1* 12.4  0.7 8.6  0.8   BASO 0.03 0.04 0.02 0.02   NRBC 0 0 0 0       CHEMISTRY LAST 7 DAYS  Recent Labs   Lab 02/04/25  1138 02/07/25  0402 02/08/25  0454  "02/09/25  0353   * 133* 136 137   K 3.9 3.8 4.2 4.4    103 109 109   CO2 26 23 24 23   ANIONGAP 2* 7* 3* 5*   BUN 25* 23* 19 18   CREATININE 1.8* 2.0* 1.8* 1.7*    96 99 132*   CALCIUM 8.2* 9.1 8.1* 8.3*   MG 2.3  --  2.0 2.0   ALBUMIN 2.7* 3.1* 2.3* 2.4*   PROT 7.0 8.7* 6.5 6.8   ALKPHOS 93 109 82 91   ALT 30 29 19 19   AST 41* 39 29 29   BILITOT 3.6* 4.7* 2.7* 2.4*       Estimated Creatinine Clearance: 66.4 mL/min (A) (based on SCr of 1.7 mg/dL (H)).    INFLAMMATORY/PROCAL  LAST 7 DAYS  Recent Labs   Lab 02/04/25  1419 02/07/25  0402 02/08/25  0454   PROCAL 0.267 0.760*  --    CRP  --   --  1.30*     No results found for: "ESR"  CRP   Date Value Ref Range Status   02/08/2025 1.30 (H) <1.00 mg/dL Final     Comment:     CRP-Normal Application expected values:   <1.0        mg/dL   Normal Range  1.0 - 5.0  mg/dL   Indicates mild inflammation  5.0 - 10.0 mg/dL   Indicates severe inflammation  >10.0        mg/dL   Represents serious processes and   frequently         indicates the presence of a bacterial   infection.      10/27/2020 0.60 0.00 - 1.00 mg/dL Final       PRIOR  MICROBIOLOGY:    Susceptibility data from last 90 days.  Collected Specimen Info Organism Ceftriaxone Clindamycin Erythromycin Oxacillin Penicillin Tetracycline Trimeth/Sulfa Vancomycin   02/04/25 Urine Staphylococcus aureus  S    S  R  S  S  S   02/04/25 Blood from Peripheral, Hand, Left Staphylococcus aureus  S  R  R  S  R  S  S  S   02/04/25 Blood from Peripheral, Hand, Right Staphylococcus aureus           01/29/25 Urine Staphylococcus aureus     S    S        LAST 7 DAYS MICROBIOLOGY   Microbiology Results (last 7 days)       Procedure Component Value Units Date/Time    Urine culture [[id number]] Collected: 02/07/25 0041    Order Status: Completed Specimen: Urine Updated: 02/09/25 0750     Urine Culture, Routine No growth    Narrative:      Specimen Source->Urine    Blood culture x two cultures. Draw prior to antibiotics. " [[id number]] Collected: 02/07/25 0233    Order Status: Completed Specimen: Blood Updated: 02/09/25 0432     Blood Culture, Routine No Growth to date      No Growth to date      No Growth to date    Narrative:      Aerobic and anaerobic  Collection has been rescheduled by 2MB at 02/06/2025 23:30 Reason:   Patient unavailable  Collection has been rescheduled by 2MB at 02/06/2025 23:30 Reason:   Patient unavailable    Blood culture x two cultures. Draw prior to antibiotics. [[id number]] Collected: 02/07/25 0234    Order Status: Completed Specimen: Blood Updated: 02/09/25 0432     Blood Culture, Routine No Growth to date      No Growth to date      No Growth to date    Narrative:      Aerobic and anaerobic  Collection has been rescheduled by 2MB at 02/06/2025 23:30 Reason:   Patient unavailable  Collection has been rescheduled by 2MB at 02/06/2025 23:30 Reason:   Patient unavailable          CURRENT/PREVIOUS VISIT EKG  Results for orders placed or performed during the hospital encounter of 02/06/25   EKG 12-lead    Collection Time: 02/07/25 12:42 AM   Result Value Ref Range    QRS Duration 106 ms    OHS QTC Calculation 479 ms    Narrative    Test Reason : Z13.6,    Vent. Rate :  70 BPM     Atrial Rate :  70 BPM     P-R Int : 148 ms          QRS Dur : 106 ms      QT Int : 444 ms       P-R-T Axes :  57  69  16 degrees    QTcB Int : 479 ms    Normal sinus rhythm  Possible Left atrial enlargement  Minimal voltage criteria for LVH, may be normal variant ( Sokolow-Coleman )  Borderline Abnormal ECG  No previous ECGs available  Confirmed by Yunior Abreu ([id number]) on 2/8/2025 6:16:02 PM    Referred By: AAAREFERRAL SELF           Confirmed By: Yunior Abreu     Significant Imaging: I have reviewed all relevant and available imaging results/findings within the past 24 hours.    I spent a total of 50 minutes on the day of the visit.This includes face to face time and non-face to face time preparing to see the patient (eg, review  of tests), obtaining and/or reviewing separately obtained history, documenting clinical information in the electronic or other health record, independently interpreting results and communicating results to the patient/family/caregiver, or care coordinator.    Telly Knott MD  Date of Service: 02/09/2025      This note was created using eyeOS voice recognition software that occasionally misinterpreted phrases or words.

## 2025-02-09 NOTE — ASSESSMENT & PLAN NOTE
- Pt with MSSA on blood cultures from 02/05.  Repeat blood cultures drawn 2/7   - Pt placed on IV Rocephin on admit adjusted to oxacillin infusion.  - urine cultures growing the same felt to be related to prostatitis.  Recently seen by Urology in clinic-she comments on continued presence of staph in urine despite multiple courses of treatment and was considering a prolonged and multidrug course prior to admission.  - given recurrent infection, now bacteremia-we will consult with ID to ensure appropriate timing and choice of antibiotic therapy  - ECHO ordered to rule out vegetation .  Negative.  Discussed with ID-will get ALLAN with Cardiology for definitive rule out  -MRI negative for fluid collection or Osteo.  -CT with no definitive abscess in the prostate, but does have enlarged prostate

## 2025-02-09 NOTE — SUBJECTIVE & OBJECTIVE
Interval History:  Pt seen and examined.  Feeling well.  Swelling and pain of the left ankle and foot continues to improve.  Today is day 2 of steroids.  Blood cultures remained negative.  Continues on oxacillin infusion.  Tyree with ID-will consult Cardiology for ALLAN.    Review of Systems   Constitutional:  Negative for chills and fever.   Respiratory:  Negative for cough and shortness of breath.    Cardiovascular:  Positive for leg swelling. Negative for chest pain.   Gastrointestinal:  Negative for diarrhea, nausea and vomiting.   Genitourinary:  Negative for difficulty urinating and dysuria.     Objective:     Vital Signs (Most Recent):  Temp: 98.4 °F (36.9 °C) (02/09/25 1149)  Pulse: 71 (02/09/25 1325)  Resp: 15 (02/09/25 1325)  BP: 116/65 (02/09/25 1149)  SpO2: 99 % (02/09/25 1325) Vital Signs (24h Range):  Temp:  [98 °F (36.7 °C)-99.1 °F (37.3 °C)] 98.4 °F (36.9 °C)  Pulse:  [67-86] 71  Resp:  [15-18] 15  SpO2:  [97 %-100 %] 99 %  BP: (115-147)/(62-80) 116/65     Weight: 109.3 kg (240 lb 14.4 oz)  Body mass index is 32.67 kg/m².    Intake/Output Summary (Last 24 hours) at 2/9/2025 1337  Last data filed at 2/9/2025 0831  Gross per 24 hour   Intake 240 ml   Output 800 ml   Net -560 ml         Physical Exam  Vitals and nursing note reviewed.   Constitutional:       Appearance: Normal appearance.   Cardiovascular:      Rate and Rhythm: Normal rate and regular rhythm.   Pulmonary:      Effort: Pulmonary effort is normal.      Breath sounds: Normal breath sounds.   Abdominal:      General: Abdomen is flat. Bowel sounds are normal.      Palpations: Abdomen is soft.   Musculoskeletal:      Cervical back: Normal range of motion and neck supple.      Left lower leg: Edema (LLE ankle and dorsum of the foot with swelling, decreased erythema and warmth to touch) present.   Skin:     General: Skin is warm and dry.      Capillary Refill: Capillary refill takes less than 2 seconds.   Neurological:      General: No focal  deficit present.      Mental Status: He is alert and oriented to person, place, and time. Mental status is at baseline.             Significant Labs: All pertinent labs within the past 24 hours have been reviewed.  CBC:   Recent Labs   Lab 02/08/25 0454 02/09/25 0353   WBC 5.55 9.04   HGB 8.8* 9.2*   HCT 26.4* 28.2*   * 95*     CMP:   Recent Labs   Lab 02/08/25 0454 02/09/25 0353    137   K 4.2 4.4    109   CO2 24 23   GLU 99 132*   BUN 19 18   CREATININE 1.8* 1.7*   CALCIUM 8.1* 8.3*   PROT 6.5 6.8   ALBUMIN 2.3* 2.4*   BILITOT 2.7* 2.4*   ALKPHOS 82 91   AST 29 29   ALT 19 19   ANIONGAP 3* 5*       Significant Imaging: I have reviewed all pertinent imaging results/findings within the past 24 hours.

## 2025-02-09 NOTE — CARE UPDATE
02/09/25 1325   PRE-TX-O2   Device (Oxygen Therapy) room air   SpO2 99 %   $ Pulse Oximetry - Single Charge Pulse Oximetry - Single   Pulse 71   Resp 15   Respiratory Evaluation   $ Care Plan Tech Time 15 min

## 2025-02-09 NOTE — CONSULTS
[facility]  Department of Cardiology  Consult Note      PATIENT NAME: Irvin Diaz Jr.  MRN: [id number]  TODAY'S DATE:  02/10/2025  ADMIT DATE: 2/6/2025                          CONSULT REQUESTED BY: Fili Mast MD    SUBJECTIVE     PRINCIPAL PROBLEM: Bacteremia due to Staphylococcus      REASON FOR CONSULT:  ALLAN- MSSA Bacteremia      HPI:    50 y/o male with PMH of HTN, CKD3, CHF, GERD, alcoholic cirrhosis, esophageal varices, diverticulitis, and prostatitis who presented to hospital with blood in urine and L ankle swelling and pain.  Patient admitted for MSSA Bacteremia and bacteriuria with hematuria, and L ankle and foot cellulitis.  Cardiology consulted for ALLAN to rule out vegetation in setting of MSSA bacteremia.     Patient has history of  small esophageal varices noted on EGD March 2024. No acute abnormalities.    Patient denies having any swallowing difficulty no recent hematemesis  Denies having nausea dyspepsia at this time        FROM ADMITTING MD NOTE    HPI:  Irvin Diaz is a 50 y/o male with PMH of HTN, CKD3, CHF, GERD, alcoholic cirrhosis, diverticulitis, and prostatitis who presented to the ED yesterday with c/o blood in his urine and left ankle swelling/pain. States that he has been following a urologist outpatient for an infection in his urine since October and was treated with antibiotics but the symptoms have returned. Blood cultures from 2/5 showed bacteremia d/t staphylococcus and he was called to come back to ED for bacteremia. He endorses continued left ankle swelling ever since a previous hip fracture but states that it has become progressively worse in the last 1-2 weeks associated with pain and warmth. Denies shortness of breath, chest pain, palpitations, N/V/D. Denies difficulty urinating or incomplete emptying, fever or chills. CT ankle shows soft tissue edema.  Repeat blood cultures were drawn on admission.  The blood cultures from 02/05 0 MSSA.  He was  placed on IV Rocephin on admission.   Admitted to hospital medicine for further evaluation and management.  Given resistant bacteria now bacteremia consult has been placed ID.     Overview/Hospital Course:  Pt was monitored closely during his stay.  He was maintained on IV Abx.  Id was consulted.  An was done to the left ankle rule out osteomyelitis-this was negative.  Repeat blood cultures were drawn on 02/07 with no growth to date.  An echo was ordered evaluate for valvular vegetations.     Interval History:  Pt seen and examined.  Feeling well.  Feels like foot is a little better.  Discussed with ID.  They are okay with oral steroids for gout component.  Id to discuss with Radiology best imaging approach to rule out prostate abscess in the setting of CKD contrast administration not ideal.  Continue the oxacillin infusion.      Review of patient's allergies indicates:   Allergen Reactions    Ciprofloxacin hcl Hallucinations    Meperidine Hives     Pt medicated w/multiple medications (demerol, protonix, and zofran)  w/i 10 mins time frame prior to developing localized hives near IV site that med was administered. Pt reports he has/takes all other medications on daily basis.     Morphine Itching    Nsaids (non-steroidal anti-inflammatory drug)      Kidney Disease    Tylenol [acetaminophen]      Hx of liver disease       Past Medical History:   Diagnosis Date    Alcohol withdrawal 5/1/2022    Alcoholic cirrhosis of liver     Alcoholic ketoacidosis 6/6/2021    Arthritis     ATN (acute tubular necrosis)     CHF (congestive heart failure)     Diverticulitis 01/2020    Esophageal varices     GERD (gastroesophageal reflux disease)     GI bleed     Hip arthritis     Left    Hypertension     Liver cirrhosis     Macrocytic anemia     Pulmonary embolism 08/2018    Unprovoked DVT.  Stop Coumadin due to GIB    Thrombocytopenia      Past Surgical History:   Procedure Laterality Date    ANKLE SURGERY      BLOCK, NERVE, PERIPHERAL  Left 06/24/2022    Procedure: Left Hip femoral-obturator accessory nerve block;  Surgeon: Robbin De La Garza DO;  Location: [facility] OR;  Service: Pain Management;  Laterality: Left;    COLON SURGERY  2007    COLONOSCOPY  09/05/2019    [facility]    COLONOSCOPY N/A 02/17/2021    Procedure: COLONOSCOPY;  Surgeon: Renea Billingsley MD;  Location: [facility];  Service: Endoscopy;  Laterality: N/A;    COLONOSCOPY N/A 2/13/2023    Procedure: COLONOSCOPY;  Surgeon: Rudy Orellana MD;  [facility]  Service: Endoscopy;  Laterality: N/A;  cirrhosis-labs done on 1/11/23  instr portal-GT  No answer for precall- [place]    COLONOSCOPY N/A 3/10/2023    Procedure: COLONOSCOPY;  Surgeon: Rudy Orellana MD;  Location: [facility] ([address] [place] [address]);  Service: Endoscopy;  Laterality: N/A;  inst via poral per pt request    COLONOSCOPY W/ BIOPSIES  02/17/2021    ESOPHAGOGASTRODUODENOSCOPY N/A 07/26/2019    Procedure: EGD (ESOPHAGOGASTRODUODENOSCOPY);  Surgeon: Brian Trivedi MD;  Location: [facility];  Service: Endoscopy;  Laterality: N/A;    ESOPHAGOGASTRODUODENOSCOPY N/A 04/08/2020    Procedure: EGD (ESOPHAGOGASTRODUODENOSCOPY);  Surgeon: Donn Acuna MD;  Location: [facility];  Service: Endoscopy;  Laterality: N/A;    ESOPHAGOGASTRODUODENOSCOPY N/A 01/03/2021    Procedure: EGD (ESOPHAGOGASTRODUODENOSCOPY)- coffee ground emesis, hx varices;  Surgeon: Steve Chairez MD;  Location: [facility];  Service: Endoscopy;  Laterality: N/A;    ESOPHAGOGASTRODUODENOSCOPY N/A 05/03/2021    Procedure: EGD (ESOPHAGOGASTRODUODENOSCOPY);  Surgeon: Jeanmarie Ngo MD;  Location: [facility];  Service: Endoscopy;  Laterality: N/A;    ESOPHAGOGASTRODUODENOSCOPY N/A 07/19/2021    Procedure: ESOPHAGOGASTRODUODENOSCOPY (EGD);  Surgeon: Claudio Medeiros MD;  [facility];  Service: Endoscopy;  Laterality: N/A;    ESOPHAGOGASTRODUODENOSCOPY  12/20/2021    ESOPHAGOGASTRODUODENOSCOPY N/A 12/20/2021    Procedure: EGD (ESOPHAGOGASTRODUODENOSCOPY);   Surgeon: Ajay Jackson MD;  Location: [facility];  Service: Endoscopy;  Laterality: N/A;    ESOPHAGOGASTRODUODENOSCOPY N/A 2022    Procedure: EGD (ESOPHAGOGASTRODUODENOSCOPY);  Surgeon: Brian Trivedi MD;  Location: [facility];  Service: Endoscopy;  Laterality: N/A;    ESOPHAGOGASTRODUODENOSCOPY N/A 2022    Procedure: EGD (ESOPHAGOGASTRODUODENOSCOPY);  Surgeon: Ajay Jackson MD;  Location: [facility];  Service: Endoscopy;  Laterality: N/A;    ESOPHAGOGASTRODUODENOSCOPY N/A 2022    Procedure: EGD (ESOPHAGOGASTRODUODENOSCOPY);  Surgeon: Edouard Maynard MD;  Location: [address]);  Service: Endoscopy;  Laterality: N/A;    ESOPHAGOGASTRODUODENOSCOPY N/A 2023    Procedure: EGD (ESOPHAGOGASTRODUODENOSCOPY);  Surgeon: Caesar Dickens MD;  Location: [facility];  Service: Endoscopy;  Laterality: N/A;    ESOPHAGOGASTRODUODENOSCOPY N/A 3/28/2024    Procedure: EGD (ESOPHAGOGASTRODUODENOSCOPY);  Surgeon: Jeanmarie Ngo MD;  Location: [facility];  Service: Endoscopy;  Laterality: N/A;    HERNIA REPAIR Left     Inguinal    INJECTION OF JOINT Right 2023    Procedure: RT Hip Injection;  Surgeon: Robbin De La Garza DO;  Location: [facility];  Service: Pain Management;  Laterality: Right;  oral - 20 mins    INJECTION OF JOINT Right 10/8/2024    Procedure: Right hip injection;  Surgeon: Robbin De La Garza DO;  Location: [facility];  Service: Pain Management;  Laterality: Right;  No sed no AC    UPPER GASTROINTESTINAL ENDOSCOPY N/A 2022     Social History     Tobacco Use    Smoking status: Former     Current packs/day: 0.00     Types: Cigarettes     Quit date: 2000     Years since quittin.1    Smokeless tobacco: Never    Tobacco comments:     quit 20 years ago   Substance Use Topics    Alcohol use: Not Currently     Comment: Quit drinking 9/12/22    Drug use: Not Currently        REVIEW OF SYSTEMS    As mentioned in HPI    OBJECTIVE     VITAL SIGNS  (Most Recent)  Temp: 98.4 °F (36.9 °C) (02/09/25 1149)  Pulse: 69 (02/09/25 1149)  Resp: 18 (02/09/25 1149)  BP: 116/65 (02/09/25 1149)  SpO2: 98 % (02/09/25 1149)    VENTILATION STATUS  Resp: 18 (02/09/25 1149)  SpO2: 98 % (02/09/25 1149)           I & O (Last 24H):  Intake/Output Summary (Last 24 hours) at 2/9/2025 1210  Last data filed at 2/9/2025 0831  Gross per 24 hour   Intake 340 ml   Output 975 ml   Net -635 ml       WEIGHTS  Wt Readings from Last 3 Encounters:   02/07/25 1645 109.3 kg (240 lb 14.4 oz)   02/06/25 2153 108.9 kg (240 lb)   02/08/25 0910 109.3 kg (240 lb 15.4 oz)   02/04/25 0921 112 kg (247 lb)       PHYSICAL EXAM    Per Hospitalist Physical Exam    HOME MEDICATIONS:  No current facility-administered medications on file prior to encounter.     Current Outpatient Medications on File Prior to Encounter   Medication Sig Dispense Refill    doxycycline (VIBRAMYCIN) 100 MG Cap Take 1 capsule (100 mg total) by mouth every 12 (twelve) hours. for 7 days 14 capsule 0    ferrous gluconate (FERATE) 240 (27 FE) MG tablet Take 2 tablets (480 mg total) by mouth 2 (two) times daily with meals. 120 tablet 3    folic acid (FOLVITE) 1 MG tablet Take 1 tablet (1 mg total) by mouth once daily. 30 tablet 5    HYDROcodone-acetaminophen (NORCO) 5-325 mg per tablet Take 1 tablet by mouth every 6 (six) hours as needed for Pain. 15 tablet 0    methocarbamoL (ROBAXIN) 500 MG Tab Take 1 tablet (500 mg total) by mouth 4 (four) times daily. for 10 days 40 tablet 0    metoprolol tartrate (LOPRESSOR) 50 MG tablet Take 1 tablet (50 mg total) by mouth 2 (two) times daily. Do not take if heart rate is less than 60 60 tablet 11    nortriptyline (PAMELOR) 25 MG capsule Take 1 capsule (25 mg total) by mouth every evening. 90 capsule 3    rifAXIMin (XIFAXAN) 550 mg Tab Take 1 tablet (550 mg total) by mouth 2 (two) times daily. 60 tablet 11    spironolactone (ALDACTONE) 25 MG tablet Take 1 tablet (25 mg total) by mouth once daily. 90  tablet 3    tadalafiL (CIALIS) 20 MG Tab Take 1 tablet (20 mg total) by mouth daily as needed (erectile dysfunction). 15 tablet 11    thiamine 100 MG tablet Take 1 tablet (100 mg total) by mouth once daily. 30 tablet 5    traMADoL (ULTRAM) 50 mg tablet Take 1 tablet (50 mg total) by mouth 4 (four) times daily as needed for Pain. 120 each 2    amoxicillin-clavulanate 875-125mg (AUGMENTIN) 875-125 mg per tablet Take 1 tablet by mouth 2 (two) times daily. for 10 days 20 tablet 0    FARXIGA 10 mg tablet Take 1 tablet (10 mg total) by mouth once daily. (Patient not taking: Reported on 2/7/2025) 90 tablet 3    papaverine 30 mg/mL injection Add phentolamine 10 mgs/ml., add PGE1  100 micrograms. 10 mL 12    sulfamethoxazole-trimethoprim 400-80mg (BACTRIM,SEPTRA) 400-80 mg per tablet Take 1 tablet by mouth 2 (two) times daily. (Patient not taking: Reported on 2/7/2025) 28 tablet 0    valsartan (DIOVAN) 40 MG tablet Take 1 tablet (40 mg total) by mouth once daily. (Patient not taking: Reported on 2/7/2025) 90 tablet 1       SCHEDULED MEDS:   enoxparin  40 mg Subcutaneous Daily    famotidine (PF)  20 mg Intravenous BID    metoprolol tartrate  50 mg Oral BID    mupirocin   Nasal BID    oxacillin 12 g in  mL CONTINUOUS INFUSION  12 g Intravenous Q24H    predniSONE  20 mg Oral Daily    spironolactone  25 mg Oral Daily       CONTINUOUS INFUSIONS:   0.9% NaCl   Intravenous Continuous 75 mL/hr at 02/08/25 1301 New Bag at 02/08/25 1301       PRN MEDS:  Current Facility-Administered Medications:     aluminum-magnesium hydroxide-simethicone, 30 mL, Oral, QID PRN    dextrose 50%, 12.5 g, Intravenous, PRN    dextrose 50%, 25 g, Intravenous, PRN    glucagon (human recombinant), 1 mg, Intramuscular, PRN    glucose, 16 g, Oral, PRN    glucose, 24 g, Oral, PRN    magnesium oxide, 800 mg, Oral, PRN    magnesium oxide, 800 mg, Oral, PRN    melatonin, 6 mg, Oral, Nightly PRN    methocarbamoL, 500 mg, Oral, Q6H PRN    naloxone, 0.02 mg,  "Intravenous, PRN    ondansetron, 4 mg, Intravenous, Q6H PRN    potassium bicarbonate, 35 mEq, Oral, PRN    potassium bicarbonate, 50 mEq, Oral, PRN    potassium bicarbonate, 60 mEq, Oral, PRN    potassium, sodium phosphates, 2 packet, Oral, PRN    potassium, sodium phosphates, 2 packet, Oral, PRN    potassium, sodium phosphates, 2 packet, Oral, PRN    senna-docusate 8.6-50 mg, 1 tablet, Oral, Daily PRN    traMADoL, 50 mg, Oral, Q6H PRN    LABS AND DIAGNOSTICS     CBC LAST 3 DAYS  Recent Labs   Lab 02/07/25  0402 02/08/25  0454 02/09/25  0353   WBC 9.64 5.55 9.04   RBC 3.16* 2.59* 2.73*   HGB 10.6* 8.8* 9.2*   HCT 32.3* 26.4* 28.2*   * 102* 103*   MCH 33.5* 34.0* 33.7*   MCHC 32.8 33.3 32.6   RDW 16.5* 16.4* 16.5*   * 102* 95*   MPV 9.8 9.4 9.5   GRAN 66.5  6.4 60.7  3.4 73.6*  6.7   LYMPH 19.7  1.9 24.9  1.4 16.6*  1.5   MONO 11.7  1.1* 12.4  0.7 8.6  0.8   BASO 0.04 0.02 0.02   NRBC 0 0 0       COAGULATION LAST 3 DAYS  No results for input(s): "LABPT", "INR", "APTT" in the last 168 hours.    CHEMISTRY LAST 3 DAYS  Recent Labs   Lab 02/04/25  1138 02/07/25  0402 02/08/25  0454 02/09/25  0353   * 133* 136 137   K 3.9 3.8 4.2 4.4    103 109 109   CO2 26 23 24 23   ANIONGAP 2* 7* 3* 5*   BUN 25* 23* 19 18   CREATININE 1.8* 2.0* 1.8* 1.7*    96 99 132*   CALCIUM 8.2* 9.1 8.1* 8.3*   MG 2.3  --  2.0 2.0   ALBUMIN 2.7* 3.1* 2.3* 2.4*   PROT 7.0 8.7* 6.5 6.8   ALKPHOS 93 109 82 91   ALT 30 29 19 19   AST 41* 39 29 29   BILITOT 3.6* 4.7* 2.7* 2.4*       CARDIAC PROFILE LAST 3 DAYS  Recent Labs   Lab 02/04/25  1030 02/04/25  1138 02/04/25  1419   BNP 88  --   --    CPK  --  137  --    TROPONINIHS  --  9.2 9.0       ENDOCRINE LAST 3 DAYS  Recent Labs   Lab 02/04/25  1138 02/04/25  1419 02/07/25  0402   TSH 3.185  --   --    PROCAL  --  0.267 0.760*       LAST ARTERIAL BLOOD GAS  ABG  No results for input(s): "PH", "PO2", "PCO2", "HCO3", "BE" in the last 168 hours.    LAST 7 DAYS " MICROBIOLOGY   Microbiology Results (last 7 days)       Procedure Component Value Units Date/Time    Urine culture [[id number]] Collected: 02/07/25 0041    Order Status: Completed Specimen: Urine Updated: 02/09/25 0750     Urine Culture, Routine No growth    Narrative:      Specimen Source->Urine    Blood culture x two cultures. Draw prior to antibiotics. [[id number]] Collected: 02/07/25 0233    Order Status: Completed Specimen: Blood Updated: 02/09/25 0432     Blood Culture, Routine No Growth to date      No Growth to date      No Growth to date    Narrative:      Aerobic and anaerobic  Collection has been rescheduled by 2MB at 02/06/2025 23:30 Reason:   Patient unavailable  Collection has been rescheduled by 2MB at 02/06/2025 23:30 Reason:   Patient unavailable    Blood culture x two cultures. Draw prior to antibiotics. [[id number]] Collected: 02/07/25 0234    Order Status: Completed Specimen: Blood Updated: 02/09/25 0432     Blood Culture, Routine No Growth to date      No Growth to date      No Growth to date    Narrative:      Aerobic and anaerobic  Collection has been rescheduled by 2MB at 02/06/2025 23:30 Reason:   Patient unavailable  Collection has been rescheduled by 2MB at 02/06/2025 23:30 Reason:   Patient unavailable            MOST RECENT IMAGING  CT Abdomen Pelvis With IV Contrast NO Oral Contrast  Narrative: EXAMINATION:  CT ABDOMEN PELVIS WITH IV CONTRAST    CLINICAL HISTORY:  Evaluate for prostate and renal abscesses;.    TECHNIQUE:  Post infusion axial images were obtained from the lung bases to the pubic symphysis 100 cc nonionic contrast was utilized for the examination.    COMPARISON:  February 4, 2025    FINDINGS:  The lung bases are unremarkable.    The liver is shrunken with nodular contour compatible with cirrhosis.  No focal hepatic mass lesion is identified.  There is a small amount of perihepatic free fluid.  Recanalized umbilical vein is noted, with multiple prominent portosystemic  collaterals noted along the anterior margin of liver and stomach.  The gallbladder and biliary tree are unremarkable.  The spleen is enlarged measuring 15.3 cm in greatest dimension.  The pancreas is within normal limits.  The adrenal glands are unremarkable.  There is no renal mass, calculus, or hydronephrosis.  Bilateral perinephric fat stranding is unchanged.  The abdominal aorta is normal in caliber.    There is no pathologic bowel wall thickening or evidence of obstruction.  Changes of previous partial colectomy are noted.  No intraperitoneal free air is identified.  Multiple small Khushi aortic lymph nodes are unchanged.    Images of the pelvis demonstrate normal appearing urinary bladder.  The prostate gland measures 5.7 cm in greatest transverse dimension, with no evidence of focal lesion or surrounding inflammatory reaction.  Bowel structures are unremarkable.  There is no pelvic free fluid.    Deformity of the proximal left femur and severe left hip osteoarthritic changes are noted.  Changes of avascular necrosis and osteo arthritis at the right hip are stable  Impression: 1. Cirrhosis with findings of portal hypertension, including portosystemic collateral formation, mild splenomegaly, and mild perihepatic ascites.  2. Stable bilateral perinephric fat stranding.  No evidence of renal mass, calculus, or hydronephrosis.  3. Additional stable findings as above.    Electronically signed by: Michelet Gandhi  Date:    02/09/2025  Time:    07:07      ECHOCARDIOGRAM RESULTS (last 5)  Results for orders placed during the hospital encounter of 02/06/25    Echo    Interpretation Summary    Left Ventricle: The left ventricle is normal in size. Normal wall thickness. There is concentric remodeling. Normal wall motion. There is normal systolic function with a visually estimated ejection fraction of 60 - 65%. Global longitudinal strain is --21.4%. There is normal diastolic function.    Right Ventricle: Normal right  ventricular cavity size. Wall thickness is normal. Systolic function is normal.    Left Atrium: Left atrium is dilated.    Mitral Valve: There is mild regurgitation with a centrally directed jet.    Tricuspid Valve: There is mild regurgitation.    Pulmonary Artery: There is borderline elevated pulmonary hypertension. The estimated pulmonary artery systolic pressure is 34 mmHg.    IVC/SVC: Normal venous pressure at 3 mmHg.      Results for orders placed during the hospital encounter of 03/27/24    Echo Saline Bubble? No    Interpretation Summary    Left Ventricle: The left ventricle is normal in size. Normal wall thickness. There is concentric hypertrophy. There is normal systolic function. Biplane (2D) method of discs ejection fraction is 57%. There is normal diastolic function.    Right Ventricle: Normal right ventricular cavity size. Wall thickness is normal. Right ventricle wall motion  is normal. Systolic function is normal.    Left Atrium: Left atrium is mildly dilated.    Mitral Valve: There is mild to moderate regurgitation with a centrally directed jet.    Pulmonary Artery: The estimated pulmonary artery systolic pressure is 19 mmHg.    IVC/SVC: Normal venous pressure at 3 mmHg.      Results for orders placed during the hospital encounter of 07/18/22    Echo Saline Bubble? No    Interpretation Summary  · The left ventricle is mildly enlarged with normal systolic function.  · The estimated ejection fraction is 63%.  · Indeterminate left ventricular diastolic function.  · Normal right ventricular size with normal right ventricular systolic function.  · Mild left atrial enlargement.  · Mild right atrial enlargement.  · Trivial posterior pericardial effusion. And outside the RA.      CURRENT/PREVIOUS VISIT EKG  Results for orders placed or performed during the hospital encounter of 02/06/25   EKG 12-lead    Collection Time: 02/07/25 12:42 AM   Result Value Ref Range    QRS Duration 106 ms    [facility] QTC Calculation  479 ms    Narrative    Test Reason : Z13.6,    Vent. Rate :  70 BPM     Atrial Rate :  70 BPM     P-R Int : 148 ms          QRS Dur : 106 ms      QT Int : 444 ms       P-R-T Axes :  57  69  16 degrees    QTcB Int : 479 ms    Normal sinus rhythm  Possible Left atrial enlargement  Minimal voltage criteria for LVH, may be normal variant ( Sokolow-Coleman )  Borderline Abnormal ECG  No previous ECGs available  Confirmed by Yunior Abreu ([id number]) on 2/8/2025 6:16:02 PM    Referred By: MAXIMINO SELF           Confirmed By: Yunior Abreu           ASSESSMENT/PLAN:     Active Hospital Problems    Diagnosis    *Bacteremia due to Staphylococcus    Acute on chronic prostatitis    Cellulitis of foot, left    Congestive heart failure, unspecified HF chronicity, unspecified heart failure type    Hyperbilirubinemia    CKD (chronic kidney disease) stage 3, GFR 30-59 ml/min    Essential hypertension       ASSESSMENT & PLAN:     MSSA Bacteremia  MSSA Bacteruria   Acute on chronic prostatitis  Cellulitis of L foot  Hypertension  Chronic HFpEF      RECOMMENDATIONS:    MSSA Bacteremia this admission.  ID requesting ALLAN to rule out vegetation. No evidence of vegetation on TTE.  Will perform ALLAN next am for further evaluation. NPO at midnight.     Risks and Benefits of the procedure have been explained to the patient. alternatives discussed in detail regarding upcoming procedures and sedation and possible complications. Some of the more  complications include but not limited to immediate or delayed perforation, bleeding, infections, pain, inadvertent injury to surrounding tissue / organs and possible need for surgical evaluation.To include but  not limited to oropharyngeal damage, tooth damage, aspiration, sore throat and risk of anesthesia have been explained to patient.  Other risks of conscious sedation to be explained by anesthesiologist.  All questions have been answered to patient's satisfaction Informed consent obtained.          Paola Gan NP  Department of Cardiology  Date of Service: 02/09/2025        I have personally interviewed and examined the patient, I have reviewed the Nurse Practitioner's history and physical, assessment, and plan. I agree with the findings and plan.  Risks and benefits of transesophageal echocardiography including risk of bleeding in his damage to teeth aspiration perforation infection and other complications but not limited to these has been discussed with the patient in detail informed consent has been obtained.  Patient understands the procedure and the implications thereof very clearly    Yunior Abreu MD  Department of Cardiology  Date of Service:  02/10/2025

## 2025-02-09 NOTE — PROGRESS NOTES
[facility] Medicine  Progress Note    Patient Name: Irvin Diaz Jr.  MRN: [id number]  Patient Class: IP- Inpatient   Admission Date: 2/6/2025  Length of Stay: 2 days  Attending Physician: Fili Mast MD  Primary Care Provider: Edouard Gibson MD        Subjective     Principal Problem:Bacteremia due to Staphylococcus        HPI:  Irvin Diaz is a 48 y/o male with PMH of HTN, CKD3, CHF, GERD, alcoholic cirrhosis, diverticulitis, and prostatitis who presented to the ED yesterday with c/o blood in his urine and left ankle swelling/pain. States that he has been following a urologist outpatient for an infection in his urine since October and was treated with antibiotics but the symptoms have returned. Blood cultures from 2/5 showed bacteremia d/t staphylococcus and he was called to come back to ED for bacteremia. He endorses continued left ankle swelling ever since a previous hip fracture but states that it has become progressively worse in the last 1-2 weeks associated with pain and warmth. Denies shortness of breath, chest pain, palpitations, N/V/D. Denies difficulty urinating or incomplete emptying, fever or chills. CT ankle shows soft tissue edema.  Repeat blood cultures were drawn on admission.  The blood cultures from 02/05 0 MSSA.  He was placed on IV Rocephin on admission.   Admitted to hospital medicine for further evaluation and management.  Given resistant bacteria now bacteremia consult has been placed ID.    Overview/Hospital Course:  Pt was monitored closely during his stay.  He was maintained on IV Abx.  Id was consulted.  An was done to the left ankle rule out osteomyelitis-this was negative.  Repeat blood cultures were drawn on 02/07 with no growth to date.  An echo was ordered evaluate for valvular vegetations which was negative. He CT of the abdomen and pelvis with IV contrast to rule out prostate abscess.  Due to his CKD, he was hydrated pre and  postprocedure.  CT was negative for any abscess.  He did have enlarged prostate.  He was continued on IV oxacillin per ID recommendations.  Cardiology was consulted for ALLAN to ensure no valvular vegetations.    Interval History:  Pt seen and examined.  Feeling well.  Swelling and pain of the left ankle and foot continues to improve.  Today is day 2 of steroids.  Blood cultures remained negative.  Continues on oxacillin infusion.  Tyree with ID-will consult Cardiology for ALLAN.    Review of Systems   Constitutional:  Negative for chills and fever.   Respiratory:  Negative for cough and shortness of breath.    Cardiovascular:  Positive for leg swelling. Negative for chest pain.   Gastrointestinal:  Negative for diarrhea, nausea and vomiting.   Genitourinary:  Negative for difficulty urinating and dysuria.     Objective:     Vital Signs (Most Recent):  Temp: 98.4 °F (36.9 °C) (02/09/25 1149)  Pulse: 71 (02/09/25 1325)  Resp: 15 (02/09/25 1325)  BP: 116/65 (02/09/25 1149)  SpO2: 99 % (02/09/25 1325) Vital Signs (24h Range):  Temp:  [98 °F (36.7 °C)-99.1 °F (37.3 °C)] 98.4 °F (36.9 °C)  Pulse:  [67-86] 71  Resp:  [15-18] 15  SpO2:  [97 %-100 %] 99 %  BP: (115-147)/(62-80) 116/65     Weight: 109.3 kg (240 lb 14.4 oz)  Body mass index is 32.67 kg/m².    Intake/Output Summary (Last 24 hours) at 2/9/2025 1337  Last data filed at 2/9/2025 0831  Gross per 24 hour   Intake 240 ml   Output 800 ml   Net -560 ml         Physical Exam  Vitals and nursing note reviewed.   Constitutional:       Appearance: Normal appearance.   Cardiovascular:      Rate and Rhythm: Normal rate and regular rhythm.   Pulmonary:      Effort: Pulmonary effort is normal.      Breath sounds: Normal breath sounds.   Abdominal:      General: Abdomen is flat. Bowel sounds are normal.      Palpations: Abdomen is soft.   Musculoskeletal:      Cervical back: Normal range of motion and neck supple.      Left lower leg: Edema (LLE ankle and dorsum of the foot with  swelling, decreased erythema and warmth to touch) present.   Skin:     General: Skin is warm and dry.      Capillary Refill: Capillary refill takes less than 2 seconds.   Neurological:      General: No focal deficit present.      Mental Status: He is alert and oriented to person, place, and time. Mental status is at baseline.             Significant Labs: All pertinent labs within the past 24 hours have been reviewed.  CBC:   Recent Labs   Lab 02/08/25 0454 02/09/25  0353   WBC 5.55 9.04   HGB 8.8* 9.2*   HCT 26.4* 28.2*   * 95*     CMP:   Recent Labs   Lab 02/08/25  0454 02/09/25  0353    137   K 4.2 4.4    109   CO2 24 23   GLU 99 132*   BUN 19 18   CREATININE 1.8* 1.7*   CALCIUM 8.1* 8.3*   PROT 6.5 6.8   ALBUMIN 2.3* 2.4*   BILITOT 2.7* 2.4*   ALKPHOS 82 91   AST 29 29   ALT 19 19   ANIONGAP 3* 5*       Significant Imaging: I have reviewed all pertinent imaging results/findings within the past 24 hours.    Assessment and Plan     * Bacteremia due to Staphylococcus  - Pt with MSSA on blood cultures from 02/05.  Repeat blood cultures drawn 2/7   - Pt placed on IV Rocephin on admit adjusted to oxacillin infusion.  - urine cultures growing the same felt to be related to prostatitis.  Recently seen by Urology in clinic-she comments on continued presence of staph in urine despite multiple courses of treatment and was considering a prolonged and multidrug course prior to admission.  - given recurrent infection, now bacteremia-we will consult with ID to ensure appropriate timing and choice of antibiotic therapy  - ECHO ordered to rule out vegetation .  Negative.  Discussed with ID-will get ALLAN with Cardiology for definitive rule out  -MRI negative for fluid collection or Osteo.  -CT with no definitive abscess in the prostate, but does have enlarged prostate        Cellulitis of foot, left  Pt with left ankle and hindfoot swelling, erythema, and warmth   - +/- gout component. Add  "steroids          Acute on chronic prostatitis  -urine culture growing MSSA  -adjusted to oxacillin infusion per ID  -ID consultation.  CT abdomen and pelvis with enlarged prostate, no definitive fluid collection.  -will need continued follow-up with Urology as an outpatient ensure resolution      Congestive heart failure, unspecified HF chronicity, unspecified heart failure type  Patient has Diastolic (HFpEF) heart failure that is Chronic. On presentation their CHF was well compensated. Most recent BNP and echo results are listed below.  No results for input(s): "BNP" in the last 72 hours.  Latest ECHO  Results for orders placed during the hospital encounter of 03/27/24    Echo Saline Bubble? No    Interpretation Summary    Left Ventricle: The left ventricle is normal in size. Normal wall thickness. There is concentric hypertrophy. There is normal systolic function. Biplane (2D) method of discs ejection fraction is 57%. There is normal diastolic function.    Right Ventricle: Normal right ventricular cavity size. Wall thickness is normal. Right ventricle wall motion  is normal. Systolic function is normal.    Left Atrium: Left atrium is mildly dilated.    Mitral Valve: There is mild to moderate regurgitation with a centrally directed jet.    Pulmonary Artery: The estimated pulmonary artery systolic pressure is 19 mmHg.    IVC/SVC: Normal venous pressure at 3 mmHg.    Current Heart Failure Medications       Plan  - Monitor strict I&Os and daily weights.    - Place on telemetry  - Low sodium diet  - appears euvolemic  - new echocardiogram ordered, pending results         Hyperbilirubinemia  Pt with hx of alcoholic cirrhosis  - followed by Neurology and liver transplant-has been denied for liver transplant due to timing of sobriety   -encouraged continued ETOH cessation  -bilirubin level stable with elevated baseline    CKD (chronic kidney disease) stage 3, GFR 30-59 ml/min  Creatine stable for now. BMP reviewed- noted " Estimated Creatinine Clearance: 56.3 mL/min (A) (based on SCr of 2 mg/dL (H)). according to latest data. Based on current GFR, CKD stage is stage 3 - GFR 30-59.  Monitor UOP and serial BMP and adjust therapy as needed. Renally dose meds. Avoid nephrotoxic medications and procedures.    Essential hypertension  Patient's blood pressure range in the last 24 hours was: BP  Min: 106/53  Max: 181/75.The patient's inpatient anti-hypertensive regimen is listed below:  Current Antihypertensives   Metoprolol    Plan  - BP is controlled, no changes needed to their regimen      VTE Risk Mitigation (From admission, onward)           Ordered     enoxaparin injection 40 mg  Daily         02/07/25 0824     IP VTE HIGH RISK PATIENT  Once         02/07/25 0824     Place sequential compression device  Until discontinued         02/07/25 0824                    Discharge Planning   RADHA: 2/10/2025     Code Status: Full Code   Medical Readiness for Discharge Date:   Discharge Plan A: Home with family                        Purnima Valenzuela NP  Department of Hospital Medicine   [facility]

## 2025-02-09 NOTE — ASSESSMENT & PLAN NOTE
-urine culture growing MSSA  -adjusted to oxacillin infusion per ID  -ID consultation.  CT abdomen and pelvis with enlarged prostate, no definitive fluid collection.  -will need continued follow-up with Urology as an outpatient ensure resolution

## 2025-02-10 ENCOUNTER — CLINICAL SUPPORT (OUTPATIENT)
Dept: CARDIOLOGY | Facility: HOSPITAL | Age: 50
End: 2025-02-10
Attending: STUDENT IN AN ORGANIZED HEALTH CARE EDUCATION/TRAINING PROGRAM
Payer: MEDICARE

## 2025-02-10 ENCOUNTER — ANESTHESIA EVENT (OUTPATIENT)
Dept: CARDIOLOGY | Facility: HOSPITAL | Age: 50
End: 2025-02-10
Payer: MEDICARE

## 2025-02-10 ENCOUNTER — ANESTHESIA (OUTPATIENT)
Dept: CARDIOLOGY | Facility: HOSPITAL | Age: 50
End: 2025-02-10
Payer: MEDICARE

## 2025-02-10 VITALS
OXYGEN SATURATION: 100 % | WEIGHT: 240.94 LBS | HEART RATE: 63 BPM | DIASTOLIC BLOOD PRESSURE: 58 MMHG | BODY MASS INDEX: 32.63 KG/M2 | RESPIRATION RATE: 22 BRPM | HEIGHT: 72 IN | SYSTOLIC BLOOD PRESSURE: 106 MMHG

## 2025-02-10 LAB
ALBUMIN SERPL BCP-MCNC: 2.4 G/DL (ref 3.5–5.2)
ALP SERPL-CCNC: 90 U/L (ref 55–135)
ALT SERPL W/O P-5'-P-CCNC: 18 U/L (ref 10–44)
ANION GAP SERPL CALC-SCNC: 4 MMOL/L (ref 8–16)
AST SERPL-CCNC: 27 U/L (ref 10–40)
BASOPHILS # BLD AUTO: 0.04 K/UL (ref 0–0.2)
BASOPHILS NFR BLD: 0.4 % (ref 0–1.9)
BILIRUB SERPL-MCNC: 2.2 MG/DL (ref 0.1–1)
BSA FOR ECHO PROCEDURE: 2.36 M2
BUN SERPL-MCNC: 19 MG/DL (ref 6–20)
CALCIUM SERPL-MCNC: 8.1 MG/DL (ref 8.7–10.5)
CHLORIDE SERPL-SCNC: 107 MMOL/L (ref 95–110)
CO2 SERPL-SCNC: 24 MMOL/L (ref 23–29)
CREAT SERPL-MCNC: 1.8 MG/DL (ref 0.5–1.4)
DIFFERENTIAL METHOD BLD: ABNORMAL
EOSINOPHIL # BLD AUTO: 0.1 K/UL (ref 0–0.5)
EOSINOPHIL NFR BLD: 0.4 % (ref 0–8)
ERYTHROCYTE [DISTWIDTH] IN BLOOD BY AUTOMATED COUNT: 16.9 % (ref 11.5–14.5)
EST. GFR  (NO RACE VARIABLE): 45.3 ML/MIN/1.73 M^2
GLUCOSE SERPL-MCNC: 97 MG/DL (ref 70–110)
HCT VFR BLD AUTO: 28.8 % (ref 40–54)
HGB BLD-MCNC: 9.5 G/DL (ref 14–18)
IMM GRANULOCYTES # BLD AUTO: 0.19 K/UL (ref 0–0.04)
IMM GRANULOCYTES NFR BLD AUTO: 1.7 % (ref 0–0.5)
LYMPHOCYTES # BLD AUTO: 2 K/UL (ref 1–4.8)
LYMPHOCYTES NFR BLD: 17.8 % (ref 18–48)
MAGNESIUM SERPL-MCNC: 2 MG/DL (ref 1.6–2.6)
MCH RBC QN AUTO: 34.3 PG (ref 27–31)
MCHC RBC AUTO-ENTMCNC: 33 G/DL (ref 32–36)
MCV RBC AUTO: 104 FL (ref 82–98)
MONOCYTES # BLD AUTO: 1 K/UL (ref 0.3–1)
MONOCYTES NFR BLD: 8.5 % (ref 4–15)
NEUTROPHILS # BLD AUTO: 8.1 K/UL (ref 1.8–7.7)
NEUTROPHILS NFR BLD: 71.2 % (ref 38–73)
NRBC BLD-RTO: 0 /100 WBC
PLATELET # BLD AUTO: 113 K/UL (ref 150–450)
PMV BLD AUTO: 9.8 FL (ref 9.2–12.9)
POTASSIUM SERPL-SCNC: 4.3 MMOL/L (ref 3.5–5.1)
PROT SERPL-MCNC: 6.8 G/DL (ref 6–8.4)
RBC # BLD AUTO: 2.77 M/UL (ref 4.6–6.2)
SODIUM SERPL-SCNC: 135 MMOL/L (ref 136–145)
WBC # BLD AUTO: 11.36 K/UL (ref 3.9–12.7)

## 2025-02-10 PROCEDURE — 25000003 PHARM REV CODE 250

## 2025-02-10 PROCEDURE — 27000207 HC ISOLATION

## 2025-02-10 PROCEDURE — 99222 1ST HOSP IP/OBS MODERATE 55: CPT | Mod: ,,, | Performed by: INTERNAL MEDICINE

## 2025-02-10 PROCEDURE — 85025 COMPLETE CBC W/AUTO DIFF WBC: CPT | Performed by: NURSE PRACTITIONER

## 2025-02-10 PROCEDURE — 25000003 PHARM REV CODE 250: Performed by: NURSE PRACTITIONER

## 2025-02-10 PROCEDURE — 37000008 HC ANESTHESIA 1ST 15 MINUTES: Performed by: INTERNAL MEDICINE

## 2025-02-10 PROCEDURE — 93312 ECHO TRANSESOPHAGEAL: CPT | Mod: 26,,, | Performed by: INTERNAL MEDICINE

## 2025-02-10 PROCEDURE — 02HV33Z INSERTION OF INFUSION DEVICE INTO SUPERIOR VENA CAVA, PERCUTANEOUS APPROACH: ICD-10-PCS | Performed by: HOSPITALIST

## 2025-02-10 PROCEDURE — 99233 SBSQ HOSP IP/OBS HIGH 50: CPT | Mod: ,,, | Performed by: NURSE PRACTITIONER

## 2025-02-10 PROCEDURE — 93312 ECHO TRANSESOPHAGEAL: CPT

## 2025-02-10 PROCEDURE — 83735 ASSAY OF MAGNESIUM: CPT | Performed by: NURSE PRACTITIONER

## 2025-02-10 PROCEDURE — B24BZZ4 ULTRASONOGRAPHY OF HEART WITH AORTA, TRANSESOPHAGEAL: ICD-10-PCS | Performed by: INTERNAL MEDICINE

## 2025-02-10 PROCEDURE — B548ZZA ULTRASONOGRAPHY OF SUPERIOR VENA CAVA, GUIDANCE: ICD-10-PCS | Performed by: HOSPITALIST

## 2025-02-10 PROCEDURE — C1751 CATH, INF, PER/CENT/MIDLINE: HCPCS

## 2025-02-10 PROCEDURE — 63600175 PHARM REV CODE 636 W HCPCS: Performed by: NURSE PRACTITIONER

## 2025-02-10 PROCEDURE — 12000002 HC ACUTE/MED SURGE SEMI-PRIVATE ROOM

## 2025-02-10 PROCEDURE — 36573 INSJ PICC RS&I 5 YR+: CPT

## 2025-02-10 PROCEDURE — 93325 DOPPLER ECHO COLOR FLOW MAPG: CPT | Mod: 26,,, | Performed by: INTERNAL MEDICINE

## 2025-02-10 PROCEDURE — 36415 COLL VENOUS BLD VENIPUNCTURE: CPT | Performed by: NURSE PRACTITIONER

## 2025-02-10 PROCEDURE — 37000009 HC ANESTHESIA EA ADD 15 MINS: Performed by: INTERNAL MEDICINE

## 2025-02-10 PROCEDURE — 80053 COMPREHEN METABOLIC PANEL: CPT | Performed by: NURSE PRACTITIONER

## 2025-02-10 PROCEDURE — 93320 DOPPLER ECHO COMPLETE: CPT | Mod: 26,,, | Performed by: INTERNAL MEDICINE

## 2025-02-10 PROCEDURE — 25000003 PHARM REV CODE 250: Performed by: INTERNAL MEDICINE

## 2025-02-10 PROCEDURE — 63600175 PHARM REV CODE 636 W HCPCS: Performed by: NURSE ANESTHETIST, CERTIFIED REGISTERED

## 2025-02-10 RX ORDER — CEFAZOLIN 2 G/1
2 INJECTION, POWDER, FOR SOLUTION INTRAMUSCULAR; INTRAVENOUS
Status: DISCONTINUED | OUTPATIENT
Start: 2025-02-10 | End: 2025-02-11 | Stop reason: HOSPADM

## 2025-02-10 RX ORDER — PROPOFOL 10 MG/ML
VIAL (ML) INTRAVENOUS
Status: DISCONTINUED | OUTPATIENT
Start: 2025-02-10 | End: 2025-02-10

## 2025-02-10 RX ADMIN — TRAMADOL HYDROCHLORIDE 50 MG: 50 TABLET, COATED ORAL at 11:02

## 2025-02-10 RX ADMIN — PREDNISONE 20 MG: 20 TABLET ORAL at 11:02

## 2025-02-10 RX ADMIN — MUPIROCIN 1 G: 20 OINTMENT TOPICAL at 08:02

## 2025-02-10 RX ADMIN — SPIRONOLACTONE 25 MG: 25 TABLET, FILM COATED ORAL at 11:02

## 2025-02-10 RX ADMIN — PROPOFOL 20 MG: 10 INJECTION, EMULSION INTRAVENOUS at 09:02

## 2025-02-10 RX ADMIN — CEFAZOLIN 2 G: 2 INJECTION, POWDER, FOR SOLUTION INTRAMUSCULAR; INTRAVENOUS at 09:02

## 2025-02-10 RX ADMIN — METOPROLOL TARTRATE 50 MG: 50 TABLET, FILM COATED ORAL at 11:02

## 2025-02-10 RX ADMIN — CEFAZOLIN 2 G: 2 INJECTION, POWDER, FOR SOLUTION INTRAMUSCULAR; INTRAVENOUS at 05:02

## 2025-02-10 RX ADMIN — METOPROLOL TARTRATE 50 MG: 50 TABLET, FILM COATED ORAL at 08:02

## 2025-02-10 RX ADMIN — SODIUM CHLORIDE: 9 INJECTION, SOLUTION INTRAVENOUS at 06:02

## 2025-02-10 RX ADMIN — TRAMADOL HYDROCHLORIDE 50 MG: 50 TABLET, COATED ORAL at 08:02

## 2025-02-10 RX ADMIN — FAMOTIDINE 20 MG: 10 INJECTION, SOLUTION INTRAVENOUS at 08:02

## 2025-02-10 RX ADMIN — ENOXAPARIN SODIUM 40 MG: 40 INJECTION SUBCUTANEOUS at 05:02

## 2025-02-10 RX ADMIN — PROPOFOL 100 MG: 10 INJECTION, EMULSION INTRAVENOUS at 09:02

## 2025-02-10 RX ADMIN — FAMOTIDINE 20 MG: 10 INJECTION, SOLUTION INTRAVENOUS at 11:02

## 2025-02-10 NOTE — CONSULTS
Double lumen PICC to right basilic vein.  41 cm in length, 34 cm arm circumference and 0 cm exposed.   Lot # [id number].

## 2025-02-10 NOTE — ASSESSMENT & PLAN NOTE
- Pt with MSSA on blood cultures from 02/05.  Repeat blood cultures drawn 2/7   - Pt placed on IV Rocephin on admit adjusted to oxacillin infusion. Changed to cefazolin 2/10.  - urine cultures growing the same felt to be related to prostatitis.  Recently seen by Urology in clinic-she comments on continued presence of staph in urine despite multiple courses of treatment and was considering a prolonged and multidrug course prior to admission.  - given recurrent infection, now bacteremia-we will consult with ID to ensure appropriate timing and choice of antibiotic therapy  - ECHO ordered to rule out vegetation .  Negative.  Discussed with ID-ALLAN negative.  -MRI negative for fluid collection or Osteo.  -CT with no definitive abscess in the prostate, but does have enlarged prostate

## 2025-02-10 NOTE — PROGRESS NOTES
Patient tolerated transesophageal echocardiography very well  No significant difficulty identified with intubating the esophagus  No blood was noted on the probe with the termination of the procedure    Patient remained hemodynamically stable postprocedure  Conscious sedation was administered and patient was recovered by anesthesiologist    Findings:  No valvular abnormalities to suggest endocarditis noted.  No masses or vegetation identified  No intracardiac shunt noted  Normal LV function  Please see the report for full detail      Yunior kirk MD  Department of Cardiology

## 2025-02-10 NOTE — ANESTHESIA POSTPROCEDURE EVALUATION
Anesthesia Post Evaluation    Patient: Irvin Diaz Jr.    Procedure(s) Performed: Procedure(s) (LRB):  Transesophageal echo (ALLAN) intra-procedure log documentation (N/A)    Final Anesthesia Type: general      Patient location during evaluation: OPS  Patient participation: Yes- Able to Participate  Level of consciousness: awake and alert  Post-procedure vital signs: reviewed and stable  Pain management: adequate  Airway patency: patent    PONV status at discharge: No PONV  Anesthetic complications: no      Cardiovascular status: hemodynamically stable  Respiratory status: unassisted, spontaneous ventilation and room air  Hydration status: euvolemic  Follow-up not needed.              Vitals Value Taken Time   /73 02/10/25 0935   Temp 36.7 °C (98.1 °F) 02/10/25 0814   Pulse 61 02/10/25 0936   Resp 15 02/10/25 0936   SpO2 98 % 02/10/25 0936   Vitals shown include unfiled device data.      No case tracking events are documented in the log.      Pain/Leno Score: Pain Rating Prior to Med Admin: 8 (2/9/2025  8:19 PM)  Pain Rating Post Med Admin: 2 (2/9/2025  9:19 PM)

## 2025-02-10 NOTE — ASSESSMENT & PLAN NOTE
Pt with left ankle and hindfoot swelling, erythema, and warmth   - +/- gout component. Continue steroids

## 2025-02-10 NOTE — TRANSFER OF CARE
Anesthesia Transfer of Care Note    Patient: Irvin Diaz Jr.    Procedure(s) Performed: Procedure(s) (LRB):  Transesophageal echo (ALLAN) intra-procedure log documentation (N/A)    Patient location: Cath Lab    Anesthesia Type: general    Transport from OR: Transported from OR on room air with adequate spontaneous ventilation    Post pain: adequate analgesia    Post assessment: no apparent anesthetic complications and tolerated procedure well    Post vital signs: stable    Level of consciousness: awake, alert and oriented    Nausea/Vomiting: no nausea/vomiting    Complications: none    Transfer of care protocol was followed      Last vitals: Visit Vitals  BP (!) 146/79   Pulse 64   Temp 36.7 °C (98.1 °F) (Oral)   Resp 20   Ht 6' (1.829 m)   Wt 109.3 kg (240 lb 14.4 oz)   SpO2 99%   BMI 32.67 kg/m²

## 2025-02-10 NOTE — ASSESSMENT & PLAN NOTE
-urine culture growing MSSA  -ID adjusted to cefazolin 2/10 which he will go home on.  -ID consultation.  CT abdomen and pelvis with enlarged prostate, no definitive fluid collection.  -will need continued follow-up with Urology as an outpatient ensure resolution

## 2025-02-10 NOTE — PROGRESS NOTES
[facility]   Department of Infectious Disease  Progress Note        PATIENT NAME: Irvin Diaz Jr.  MRN: [id number]  TODAY'S DATE: 02/10/2025  ADMIT DATE: 2/6/2025  LOS: 3 days    CHIEF COMPLAINT: Called to Return (Pt states he got a call to return to ed. /)    PRINCIPLE PROBLEM: Bacteremia due to Staphylococcus    REASON FOR CONSULT: staph bacteremia- suspected prostatits source.  Failed a course of OP abx in past vs. course not long enough     INTERVAL HISTORY      02/08/2025:   Unable to obtain CT abdomen and pelvis with contrast because of baseline CKD.  Repeat urine and blood cultures so far negative.  Left Foot and ankle swelling and redness also better.    02/09/2025:  Left foot pain almost resolved.  Swelling and redness also significantly better on steroids.  CT abdomen and pelvis negative for any renal abnormality.  No prostate abscess seen.  Repeat blood and urine cultures remain negative.    2/10/2025@1159 (Denette):  Patient is lying in bed awake and alert.  He said he is feeling much better today.  He said all the times he presented with urinary tract infections he had hematuria and no other symptoms such as dysuria, fevers or chills or any similar symptoms.  He follows with Urology and [facility].  He has never been told that he has an enlarged prostate or any prostate problems.  Left ankle pain is much better.   ALLAN today with no vegetations present per report.   An MRI from 02/07 of the left ankle with tenosynovitis reactive or inflammatory in nature.  No osteomyelitis or fluid collections.  A CT from same date with soft tissue edema and no fluid collection with mild arthritic changes. T-max 98.8° in the last 24 hours.  WBC 11.36, platelets 113, H&H 9.5/28.8, no left shift, eosinophils 0.4%, creatinine 1.8, BUN 19, creatinine clearance 62.7, last CRP 1.30.  Last procalcitonin 0.760.  ESR 36.      Antibiotics (From admission, onward)      Start     Stop Route Frequency Ordered     02/10/25 1615  ceFAZolin 2 g         -- IV Every 6 hours (non-standard times) 02/10/25 1514    02/07/25 0930  mupirocin 2 % ointment         02/12/25 0859 Nasl 2 times daily 02/07/25 0825          Antifungals (From admission, onward)      None           Antivirals (From admission, onward)      None            ASSESSMENT and PLAN      High-grade MSSA bacteremia in a patient with recurrent MSSA bacteriuria.  TTE negative for vegetation.  We will consult Cardiology for ALLAN.  - 02/04/2025 blood cultures x2 sets with 3/4  bottles positive for staph aureus resistant to clindamycin, erythromycin and penicillin  -02/07/2025 blood cultures x2 sets no growth to date    2. Recurrent MSSA bacteriuria with hematuria.  CT abdomen and pelvis with contrast negative for prostate on renal abscess.  We will most likely treat for at least 4 weeks for this indication.    - 03/27/2024 urine culture with staph aureus   -10/22/2024 urine culture with staph aureus   -01/29/2025  urine culture with staph aureus  - 02/04/2025 urine culture with staph aureus    - 02/07/2025 urine culture negative    3. Left ankle and foot cellulitis.  Also with gout.  Improved on steroids.  Gout management as per hospitalist. Antibiotics as above.       4. Compensated alcoholic cirrhosis.  Immune to HAV and HBV.  HCV screen negative on multiple locations.  Management as per hospitalist.       5. Macrocytic anemia.  Most likely related to cirrhosis.    6. CKD     RECOMMENDATIONS:    Stop Oxacillin   Start cefazolin 2 g IV every 6 hours  Plan to send home on continuous infusion cefazolin 8 g IV every 24 hours cefazolin for 4 weeks  See OPAT Plan of Care for outpatient antibiotic recommendation      D/W Dr Calloway    Please send [employer] secure chat with any questions.        SUBJECTIVE    Irvin WOOD Joe Arellano. is a 49 y.o. male with history of alcoholic cirrhosis with portal hypertension, hypertension, GERD.  In an episode of hematuria in October 20, 2024.  Urine  culture that time apparently grew MSSA.  Treated with antibiotics with resolution.  Appears he was lost to follow-up to Urology Service.     Started feeling unwell about 2 weeks ago around the snowstorm time.  Then started having hematuria again and later developed left foot swelling and pain.  Seen by his PCP on 01/29/2025.  He was diagnosed with cellulitis left worse than prescribed Keflex.  Presents to the emergency room with a nice day 1/30/25 for evaluation.  Uric acid level was high.  X-ray foot is unremarkable SR 4 screws in the medial malleolus area.  He was given steroid and discharged on allopurinol.  Staphylococcus aureus.  Back in the ER again on 02/04/2025 with left foot pain.  Stabilized and discharged once again.     Hematuria persisted. Left foot swelling also positive but did improve.  So Urology CMP 02/06/2025.  Called back to the ER because blood culture 02/04/2025 grew.  Noncontrast CT abdomen and pelvis shows cirrhosis but no other acute findings.  Noncontrast CT left ankle and foot shows cellulitis and no abscess.  MRI left ankle and foot documented tenosynovitis but no abscess or osteomyelitis.  ID asked to assist with his care.        Antibiotic history:    Keflex:.  01/29/2025-02/06/2025  Ceftriaxone: 2/6/25-2/7/25  Oxacillin: 2/7/25-     Microbiology:    Urine culture 03/27/2024, 10/22/2024: MSSA   Urine culture 01/29/2025, 02/04/2025: MSSA   Blood cultures on 04/25: MSSA 2/2   Urine culture 02/07/2025: NGTD  Blood culture 02/07/2025: NGTD    Review of Systems  Negative except as stated above in Interval History     OBJECTIVE   Temp:  [98 °F (36.7 °C)-98.8 °F (37.1 °C)] 98 °F (36.7 °C)  Pulse:  [61-74] 61  Resp:  [7-22] 16  SpO2:  [96 %-100 %] 96 %  BP: (106-157)/(56-92) 151/91  Temp:  [98 °F (36.7 °C)-98.8 °F (37.1 °C)]   Temp: 98 °F (36.7 °C) (02/10/25 1112)  Pulse: 61 (02/10/25 1112)  Resp: 16 (02/10/25 1152)  BP: (!) 151/91 (02/10/25 1115)  SpO2: 96 % (02/10/25 1112)    Intake/Output  Summary (Last 24 hours) at 2/10/2025 1330  Last data filed at 2/10/2025 0827  Gross per 24 hour   Intake 200 ml   Output --   Net 200 ml       Physical Exam  General:  middle-aged male, lying in bed, awake and alert, he is in no distress in his pleasant and conversant.  Eyes: Eyes with no icterus or injection. Vision grossly normal  Ears: Hearing grossly normal.  Nose: Nares patent  Mouth: Moist mucous membranes, dentition is good. No ulcerations, erythema or exudates.  Neck: Supple, no tenderness to palpation.  Cardiovascular: Regular rate and rhythm, no murmurs,  improved edema to left ankle.    Respiratory:  Clear to auscultation bilaterally, no tachypnea or increased work of breathing.  Gastrointestinal:  Soft with active bowel sounds, no tenderness to palpation, no distention.  Genitourinary:  No suprapubic tenderness.  Musculoskeletal:  Moves all extremities with good strength.    Mild left lower extremity ankle edema, no tenderness.  Skin:  Warm and dry, no obvious rashes.    Neuro:   Oriented, conversant, follows commands.  Psych: Good mood, normal affect.   VAD:  PIV   PICC to be placed today 2/10  ISOLATION:  None     Wounds:     Significant Labs: All pertinent labs within the past 24 hours have been reviewed.    CBC LAST 7 DAYS  Recent Labs   Lab 02/04/25  1030 02/07/25  0402 02/08/25  0454 02/09/25  0353 02/10/25  0439   WBC 9.96 9.64 5.55 9.04 11.36   RBC 3.06* 3.16* 2.59* 2.73* 2.77*   HGB 10.4* 10.6* 8.8* 9.2* 9.5*   HCT 31.5* 32.3* 26.4* 28.2* 28.8*   * 102* 102* 103* 104*   MCH 34.0* 33.5* 34.0* 33.7* 34.3*   MCHC 33.0 32.8 33.3 32.6 33.0   RDW 16.9* 16.5* 16.4* 16.5* 16.9*   PLT 75* 140* 102* 95* 113*   MPV 12.2 9.8 9.4 9.5 9.8   GRAN 70.3  7.0 66.5  6.4 60.7  3.4 73.6*  6.7 71.2  8.1*   LYMPH 17.2*  1.7 19.7  1.9 24.9  1.4 16.6*  1.5 17.8*  2.0   MONO 10.3  1.0 11.7  1.1* 12.4  0.7 8.6  0.8 8.5  1.0   BASO 0.03 0.04 0.02 0.02 0.04   NRBC 0 0 0 0 0       CHEMISTRY LAST 7  "DAYS  Recent Labs   Lab 02/04/25  1138 02/07/25  0402 02/08/25  0454 02/09/25  0353 02/10/25  0439   * 133* 136 137 135*   K 3.9 3.8 4.2 4.4 4.3    103 109 109 107   CO2 26 23 24 23 24   ANIONGAP 2* 7* 3* 5* 4*   BUN 25* 23* 19 18 19   CREATININE 1.8* 2.0* 1.8* 1.7* 1.8*    96 99 132* 97   CALCIUM 8.2* 9.1 8.1* 8.3* 8.1*   MG 2.3  --  2.0 2.0 2.0   ALBUMIN 2.7* 3.1* 2.3* 2.4* 2.4*   PROT 7.0 8.7* 6.5 6.8 6.8   ALKPHOS 93 109 82 91 90   ALT 30 29 19 19 18   AST 41* 39 29 29 27   BILITOT 3.6* 4.7* 2.7* 2.4* 2.2*       Estimated Creatinine Clearance: 62.7 mL/min (A) (based on SCr of 1.8 mg/dL (H)).    INFLAMMATORY/PROCAL  LAST 7 DAYS  Recent Labs   Lab 02/04/25  1419 02/07/25  0402 02/08/25  0454   PROCAL 0.267 0.760*  --    CRP  --   --  1.30*     No results found for: "ESR"  CRP   Date Value Ref Range Status   02/08/2025 1.30 (H) <1.00 mg/dL Final     Comment:     CRP-Normal Application expected values:   <1.0        mg/dL   Normal Range  1.0 - 5.0  mg/dL   Indicates mild inflammation  5.0 - 10.0 mg/dL   Indicates severe inflammation  >10.0        mg/dL   Represents serious processes and   frequently         indicates the presence of a bacterial   infection.      10/27/2020 0.60 0.00 - 1.00 mg/dL Final       PRIOR  MICROBIOLOGY:    Susceptibility data from last 90 days.  Collected Specimen Info Organism Ceftriaxone Clindamycin Erythromycin Oxacillin Penicillin Tetracycline Trimeth/Sulfa Vancomycin   02/04/25 Urine Staphylococcus aureus  S    S  R  S  S  S   02/04/25 Blood from Peripheral, Hand, Left Staphylococcus aureus  S  R  R  S  R  S  S  S   02/04/25 Blood from Peripheral, Hand, Right Staphylococcus aureus           01/29/25 Urine Staphylococcus aureus     S    S        LAST 7 DAYS MICROBIOLOGY   Microbiology Results (last 7 days)       Procedure Component Value Units Date/Time    Blood culture x two cultures. Draw prior to antibiotics. [[id number]] Collected: 02/07/25 0233    Order Status: " Completed Specimen: Blood Updated: 02/10/25 0432     Blood Culture, Routine No Growth to date      No Growth to date      No Growth to date      No Growth to date    Narrative:      Aerobic and anaerobic  Collection has been rescheduled by 2MB at 02/06/2025 23:30 Reason:   Patient unavailable  Collection has been rescheduled by 2MB at 02/06/2025 23:30 Reason:   Patient unavailable    Blood culture x two cultures. Draw prior to antibiotics. [[id number]] Collected: 02/07/25 0234    Order Status: Completed Specimen: Blood Updated: 02/10/25 0432     Blood Culture, Routine No Growth to date      No Growth to date      No Growth to date      No Growth to date    Narrative:      Aerobic and anaerobic  Collection has been rescheduled by 2MB at 02/06/2025 23:30 Reason:   Patient unavailable  Collection has been rescheduled by 2MB at 02/06/2025 23:30 Reason:   Patient unavailable    Urine culture [[id number]] Collected: 02/07/25 0041    Order Status: Completed Specimen: Urine Updated: 02/09/25 0750     Urine Culture, Routine No growth    Narrative:      Specimen Source->Urine          CURRENT/PREVIOUS VISIT EKG  Results for orders placed or performed during the hospital encounter of 02/06/25   EKG 12-lead    Collection Time: 02/07/25 12:42 AM   Result Value Ref Range    QRS Duration 106 ms    [facility] QTC Calculation 479 ms    Narrative    Test Reason : Z13.6,    Vent. Rate :  70 BPM     Atrial Rate :  70 BPM     P-R Int : 148 ms          QRS Dur : 106 ms      QT Int : 444 ms       P-R-T Axes :  57  69  16 degrees    QTcB Int : 479 ms    Normal sinus rhythm  Possible Left atrial enlargement  Minimal voltage criteria for LVH, may be normal variant ( Sokolow-Coleman )  Borderline Abnormal ECG  No previous ECGs available  Confirmed by Yunior Abreu ([id number]) on 2/8/2025 6:16:02 PM    Referred By: AAAREFERRAL SELF           Confirmed By: Yunior Abreu     Echocardiography Findings ALLAN 2/10/2025  Left Ventricle The left ventricle is  normal in size. Normal wall thickness. Normal wall motion. There is normal systolic function with a visually estimated ejection fraction of 60 - 65%.   Right Ventricle Normal right ventricular cavity size. Wall thickness is normal. Systolic function is normal.   Left Atrium Normal left atrial size and left atrium is at the upper limits of normal in size. The left atrial appendage appears normal. Appendage velocity is normal at greater than 40 cm/sec. There is no thrombus in the left atrial cavity.   Right Atrium Normal and upper limits of normal right atrial size.   Aortic Valve The aortic valve is structurally normal. There is normal leaflet mobility. There is no mass/vegetation present. There is no significant regurgitation.   Mitral Valve The mitral valve is structurally normal. There is normal leaflet mobility. There is no mass/vegetation present. There is no stenosis. There is mild regurgitation with a centrally directed jet.   Tricuspid Valve The tricuspid valve is structurally normal. There is no mass/vegetation present. There is normal leaflet mobility. There is no stenosis. There is mild regurgitation.   IVC/SVC IVC was not assessed.   Ascending Aorta Ascending aorta is normal. Descending aorta is normal. There is no mass present.   Pericardium and Other Findings There is no pericardial effusion.   Summary    Left Ventricle: The left ventricle is normal in size. Normal wall thickness. There is normal systolic function with a visually estimated ejection fraction of 60 - 65%.    Right Ventricle: Normal right ventricular cavity size. Wall thickness is normal. Systolic function is normal.    Left Atrium: There is no thrombus in the left atrial cavity.    Aortic Valve: There is no mass/vegetation present.    Mitral Valve: There is no mass/vegetation present. There is mild regurgitation with a centrally directed jet.    Tricuspid Valve: There is no mass/vegetation present. There is mild  regurgitation.        Significant Imaging: I have reviewed all relevant and available imaging results/findings within the past 24 hours.      I spent a total of 60 minutes on the day of the visit.This includes face to face time and non-face to face time preparing to see the patient (eg, review of tests), obtaining and/or reviewing separately obtained history, documenting clinical information in the electronic or other health record, independently interpreting results and communicating results to the patient/family/caregiver, or care coordinator.    Nerissa Baptiste NP  Date of Service: 02/10/2025      This note was created using [employer] voice recognition software that occasionally misinterpreted phrases or words.

## 2025-02-10 NOTE — ANESTHESIA PREPROCEDURE EVALUATION
02/10/2025  Irvin Diaz Jr. is a 49 y.o., male.      Patient Active Problem List   Diagnosis    Coagulopathy    Thrombocytopenia    History of pulmonary embolus (PE)    AVN (avascular necrosis of bone)    Hydronephrosis of right kidney    Transaminitis    Elevated lipase    Essential hypertension    Alcoholic cirrhosis of liver    CKD (chronic kidney disease) stage 3, GFR 30-59 ml/min    Hip arthritis    Arm pain    History of GI bleed    Colitis    Alcohol abuse    Ascites due to alcoholic cirrhosis    Diastolic dysfunction    Jaundice    Hyperbilirubinemia    Dilation of common bile duct    Chronic left hip pain    Alcohol use disorder, severe, dependence    Encounter for pre-transplant evaluation for liver transplant    Hepatic encephalopathy    Long-term use of high-risk medication    Melena    Secondary esophageal varices without bleeding    Anemia in chronic kidney disease (CKD)    HTN (hypertension)    Ventricular tachycardia    Marijuana smoker    Opioid dependence, uncomplicated    Congestive heart failure, unspecified HF chronicity, unspecified heart failure type    Bacteremia due to Staphylococcus    Acute on chronic prostatitis    Cellulitis of foot, left       Past Surgical History:   Procedure Laterality Date    ANKLE SURGERY      BLOCK, NERVE, PERIPHERAL Left 06/24/2022    Procedure: Left Hip femoral-obturator accessory nerve block;  Surgeon: Robbin De La Garza DO;  Location: [facility] OR;  Service: Pain Management;  Laterality: Left;    COLON SURGERY  2007    COLONOSCOPY  09/05/2019    [facility]    COLONOSCOPY N/A 02/17/2021    Procedure: COLONOSCOPY;  Surgeon: Renea Billingsley MD;  Location: [facility];  Service: Endoscopy;  Laterality: N/A;    COLONOSCOPY N/A 2/13/2023    Procedure: COLONOSCOPY;  Surgeon: Rudy Orellana MD;  Location: [facility] ([address] [place] [address]);  Service: Endoscopy;  Laterality: N/A;   cirrhosis-labs done on 1/11/23  instr portal-GT  No answer for precall- [place]    COLONOSCOPY N/A 3/10/2023    Procedure: COLONOSCOPY;  Surgeon: Rudy Orellana MD;  Location: [address]);  Service: Endoscopy;  Laterality: N/A;  inst via poral per pt request    COLONOSCOPY W/ BIOPSIES  02/17/2021    ESOPHAGOGASTRODUODENOSCOPY N/A 07/26/2019    Procedure: EGD (ESOPHAGOGASTRODUODENOSCOPY);  Surgeon: Brian Trivedi MD;  Location: [facility];  Service: Endoscopy;  Laterality: N/A;    ESOPHAGOGASTRODUODENOSCOPY N/A 04/08/2020    Procedure: EGD (ESOPHAGOGASTRODUODENOSCOPY);  Surgeon: Donn Acuna MD;  Location: [facility];  Service: Endoscopy;  Laterality: N/A;    ESOPHAGOGASTRODUODENOSCOPY N/A 01/03/2021    Procedure: EGD (ESOPHAGOGASTRODUODENOSCOPY)- coffee ground emesis, hx varices;  Surgeon: Steve Chairez MD;  Location: [facility];  Service: Endoscopy;  Laterality: N/A;    ESOPHAGOGASTRODUODENOSCOPY N/A 05/03/2021    Procedure: EGD (ESOPHAGOGASTRODUODENOSCOPY);  Surgeon: Jeanmarie Ngo MD;  Location: [facility];  Service: Endoscopy;  Laterality: N/A;    ESOPHAGOGASTRODUODENOSCOPY N/A 07/19/2021    Procedure: ESOPHAGOGASTRODUODENOSCOPY (EGD);  Surgeon: Claudio Medeiros MD;  Location: [facility];  Service: Endoscopy;  Laterality: N/A;    ESOPHAGOGASTRODUODENOSCOPY  12/20/2021    ESOPHAGOGASTRODUODENOSCOPY N/A 12/20/2021    Procedure: EGD (ESOPHAGOGASTRODUODENOSCOPY);  Surgeon: Ajay Jackson MD;  Location: [facility];  Service: Endoscopy;  Laterality: N/A;    ESOPHAGOGASTRODUODENOSCOPY N/A 05/03/2022    Procedure: EGD (ESOPHAGOGASTRODUODENOSCOPY);  Surgeon: Brian Trivedi MD;  Location: [facility];  Service: Endoscopy;  Laterality: N/A;    ESOPHAGOGASTRODUODENOSCOPY N/A 7/7/2022    Procedure: EGD (ESOPHAGOGASTRODUODENOSCOPY);  Surgeon: Ajay Jackson MD;  Location: [facility];  Service: Endoscopy;  Laterality: N/A;    ESOPHAGOGASTRODUODENOSCOPY N/A 11/29/2022    Procedure: EGD  (ESOPHAGOGASTRODUODENOSCOPY);  Surgeon: Edouard Maynard MD;  Location: [facility] ENDO (2ND FLR);  Service: Endoscopy;  Laterality: N/A;    ESOPHAGOGASTRODUODENOSCOPY N/A 4/28/2023    Procedure: EGD (ESOPHAGOGASTRODUODENOSCOPY);  Surgeon: Caesar Dickens MD;  Location: [facility] ENDO;  Service: Endoscopy;  Laterality: N/A;    ESOPHAGOGASTRODUODENOSCOPY N/A 3/28/2024    Procedure: EGD (ESOPHAGOGASTRODUODENOSCOPY);  Surgeon: Jeanmarie Ngo MD;  Location: [facility] ENDO;  Service: Endoscopy;  Laterality: N/A;    HERNIA REPAIR Left     Inguinal    INJECTION OF JOINT Right 9/28/2023    Procedure: RT Hip Injection;  Surgeon: Robbin De La Garza DO;  Location: [facility];  Service: Pain Management;  Laterality: Right;  oral - 20 mins    INJECTION OF JOINT Right 10/8/2024    Procedure: Right hip injection;  Surgeon: Robbin De La Garza DO;  Location: [facility];  Service: Pain Management;  Laterality: Right;  No sed no AC    UPPER GASTROINTESTINAL ENDOSCOPY N/A 07/07/2022        Tobacco Use:  The patient  reports that he quit smoking about 25 years ago. His smoking use included cigarettes. He has never used smokeless tobacco.     Results for orders placed or performed during the hospital encounter of 02/06/25   EKG 12-lead    Collection Time: 02/07/25 12:42 AM   Result Value Ref Range    QRS Duration 106 ms    [facility] QTC Calculation 479 ms    Narrative    Test Reason : Z13.6,    Vent. Rate :  70 BPM     Atrial Rate :  70 BPM     P-R Int : 148 ms          QRS Dur : 106 ms      QT Int : 444 ms       P-R-T Axes :  57  69  16 degrees    QTcB Int : 479 ms    Normal sinus rhythm  Possible Left atrial enlargement  Minimal voltage criteria for LVH, may be normal variant ( Sokolow-Coleman )  Borderline Abnormal ECG  No previous ECGs available  Confirmed by Yunior Abreu ([id number]) on 2/8/2025 6:16:02 PM    Referred By: AAAREFERRAL SELF           Confirmed By: Yunior Abreu        Imaging Results              MRI Ankle  Without Contrast Left (Final result)  Result time 02/07/25 15:43:56      Final result by Sin Beavers MD (02/07/25 15:43:56)                   Impression:      1. Negative for osteomyelitis about the left ankle.  2. Extensive subcutaneous edema about the left ankle.  3. Prominent heterogeneous increased T2 signal about extensor hallucis longus tendon, with tendon appearing intact throughout its visualized course.  The finding suggests tenosynovitis, either reactive/inflammatory in nature or, in the appropriate clinical setting, due to underlying infection.      Electronically signed by: Sin Beavers  Date:    02/07/2025  Time:    15:43               Narrative:    EXAMINATION:  MRI ANKLE WITHOUT CONTRAST LEFT    CLINICAL HISTORY:  Septic arthritis suspected, ankle, xray done;Osteomyelitis suspected, ankle, xray done;Soft tissue infection suspected, ankle, xray done;    TECHNIQUE:  Left ankle MRI without IV contrast.    COMPARISON:  Left ankle CT 02/07/2025, left foot radiographs 01/30/2025    FINDINGS:  Susceptibility artifact related to surgical screws coursing through medial malleolus is present.    Bone marrow signal is normal.  No confluent low T1 bone marrow signal alteration to suggest osteomyelitis.  Physiologic amount of fluid lies in tibiotalar and subtalar joints.  No articular cartilage defect or osteochondral lesion.    Visualized components of the deltoid ligament complex are intact.  Lateral left ankle ligaments are intact.  Lisfranc ligament is partially visualized and intact.    Medial flexor and lateral peroneal tendons are normal.  The Achilles tendon is normal.  Trace fluid lies in retrocalcaneal bursa.    Tibialis anterior and extensor digitorum tendons are normal.  There is prominent heterogeneous increased T2 signal about the extensor hallucis longus tendon which appears intact.    Extensive subcutaneous edema affects the left foot, ankle, and distal left calf.                                        CT Ankle (Including Hindfoot) Without Contrast Left (Final result)  Result time 02/07/25 07:49:07   Procedure changed from CT Ankle (Including Hindfoot) With Contrast Left     Final result by Michelet Gandhi MD (02/07/25 07:49:07)                   Impression:      1. Diffuse nonspecific left lower extremity soft tissue edema.  No focal drainable fluid collection is identified.  2. Postoperative changes.  3. Mild arthritic change as above.  4. If osteomyelitis is a strong clinical concern, follow-up MR could be utilized.      Electronically signed by: Michelet Gandhi  Date:    02/07/2025  Time:    07:49               Narrative:    EXAMINATION:  CT ANKLE (INCLUDING HINDFOOT) WITHOUT CONTRAST LEFT    CLINICAL HISTORY:  Osteomyelitis, ankle;    COMPARISON:  Left foot radiographs, January 30, 2025    FINDINGS:  Thin-section axial images were obtained, with reformatted imaging in the sagittal and coronal planes.    There is no CT evidence of acute fracture, dislocation, or acute osseous destructive process.  There has been previous fixation of medial malleolus fracture.  Mild midfoot osteoarthritic changes are noted, with mild arthritic changes at the 1st metatarsophalangeal joint.  No pathologic joint fluid accumulation is identified.    There is diffuse soft tissue swelling of the left lower leg, ankle, and foot, most pronounced over the medial and lateral malleoli.  No focal drainable fluid collection is identified.                                       X-Ray Chest AP Portable (Final result)  Result time 02/07/25 00:20:45      Final result by Edouard Chavarria MD (02/07/25 00:20:45)                   Impression:      As above.      Electronically signed by: Edouard Chavarria MD  Date:    02/07/2025  Time:    00:20               Narrative:    EXAMINATION:  XR CHEST AP PORTABLE    CLINICAL HISTORY:  Sepsis;    TECHNIQUE:  Single frontal view of the chest was  performed.    COMPARISON:  02/04/2025    FINDINGS:  There is enlargement of the cardiomediastinal silhouette, similar to prior examination.  There is central vascular congestion.  There is mild increased interstitial attenuation which may represent mild interstitial edema.  Asymmetric opacification of the left lung base appears to correspond to prominent mediastinal fat a prior CT exam.  No definite new large confluent airspace consolidation appreciated.  No evidence of pneumothorax.  Osseous structures appear intact.                                       Lab Results   Component Value Date    WBC 11.36 02/10/2025    HGB 9.5 (L) 02/10/2025    HCT 28.8 (L) 02/10/2025     (H) 02/10/2025     (L) 02/10/2025     BMP  Lab Results   Component Value Date     (L) 02/10/2025    K 4.3 02/10/2025     02/10/2025    CO2 24 02/10/2025    BUN 19 02/10/2025    CREATININE 1.8 (H) 02/10/2025    CALCIUM 8.1 (L) 02/10/2025    ANIONGAP 4 (L) 02/10/2025    GLU 97 02/10/2025     (H) 02/09/2025    GLU 99 02/08/2025       Results for orders placed during the hospital encounter of 07/18/22    Echo Saline Bubble? No    Interpretation Summary  · The left ventricle is mildly enlarged with normal systolic function.  · The estimated ejection fraction is 63%.  · Indeterminate left ventricular diastolic function.  · Normal right ventricular size with normal right ventricular systolic function.  · Mild left atrial enlargement.  · Mild right atrial enlargement.  · Trivial posterior pericardial effusion. And outside the RA.        Pre-op Assessment    I have reviewed the Patient Summary Reports.     I have reviewed the Nursing Notes. I have reviewed the NPO Status.   I have reviewed the Medications.     Review of Systems  Anesthesia Hx:  No problems with previous Anesthesia             Denies Family Hx of Anesthesia complications.    Denies Personal Hx of Anesthesia complications.                    Social:  Former  Smoker, Alcohol Use    Alcohol Use:   Alcohol Abuse:   Hematology/Oncology:  Hematology Normal   Oncology Normal        --  Thrombocytopenia:            Hematology Comments: History of pulmonary embolus (PE)    Thrombocytopenia                    EENT/Dental:  EENT/Dental Normal           Cardiovascular:     Hypertension, poorly controlled       CHF       ECG has been reviewed.         Congestive Heart Failure (CHF)                Hypertension         Pulmonary:  Pulmonary Normal        History of pulmonary embolus (PE)               Renal/:  Chronic Renal Disease   ATN (acute tubular necrosis)     Kidney Function/Disease, Chronic Kidney Disease (CKD) , CKD Stage III (GFR 30-59)          Other Renal / Gu Conditions: Hydronephrosis  Hepatic/GI:     GERD Liver Disease,  History of GI bleed    Gastrointestinal hemorrhage with hematemesis     Esophageal / Stomach Disorders Gerd, Esophageal Disorder, Esophageal Varices       Liver Disease, Alcoholic Liver Disease  , Cirrhosis  Ascites, Esophageal Varices, Thrombocytopenia    Musculoskeletal:  Arthritis        Bone Disorders:     AVN (avascular necrosis of bone)       Neurological:  Neurology Normal                                      Endocrine:  Endocrine Normal            Dermatological:  Skin Normal    Psych:  Psychiatric Normal                    Physical Exam  General: Well nourished, Cooperative, Alert and Oriented    Airway:  Mallampati: III   Mouth Opening: Normal  TM Distance: Normal  Neck ROM: Normal ROM    Dental:  Intact    Chest/Lungs:  Clear to auscultation, Normal Respiratory Rate    Heart:  Rate: Tachycardia  Rhythm: Regular Rhythm  Sounds: Normal        Anesthesia Plan  Type of Anesthesia, risks & benefits discussed:    Anesthesia Type: Gen Natural Airway  Intra-op Monitoring Plan: Standard ASA Monitors  Informed Consent: Informed consent signed with the Patient and all parties understand the risks and agree with anesthesia plan.  All questions  answered.   ASA Score: 4  Anesthesia Plan Notes: Hx of varices    Ready For Surgery From Anesthesia Perspective.     .

## 2025-02-10 NOTE — PROCEDURES
[facility]  Date/Time: 2/10/2025 2:24 PM  Location procedure was performed: [facility] [address]  Performed by: Nicola Barron RN  Assisting provider: Wesly Sun RN  Pre-operative Diagnosis: Bacteremia  Consent Done: Yes  Time out: Immediately prior to procedure a time out was called to verify the correct patient, procedure, equipment, support staff and site/side marked as required  Indications: med administration  Anesthesia: local infiltration  Local anesthetic: lidocaine 1% without epinephrine  Anesthetic Total (mL): 3  Preparation: skin prepped with ChloraPrep  Skin prep agent dried: skin prep agent completely dried prior to procedure  Sterile barriers: all five maximum sterile barriers used - cap, mask, sterile gown, sterile gloves, and large sterile sheet  Hand hygiene: hand hygiene performed prior to central venous catheter insertion  Location details: right basilic  Catheter type: double lumen  Catheter size: 5 Fr  Catheter Length: 41cm    Ultrasound guidance: yes  Vessel Caliber: medium and patent, compressibility normal  Vascular Doppler: not done  Needle advanced into vessel with real time Ultrasound guidance.  Guidewire confirmed in vessel.  Image recorded and saved.  Sterile sheath used.  no esophageal manometryNumber of attempts: 1  Post-procedure: chlorhexidine patch, blood return through all ports and sterile dressing applied  Technical procedures used: 3CG  Estimated blood loss (mL): 0  Specimens: No  Implants: No  Assessment: placement verified by x-ray  Complications: none

## 2025-02-10 NOTE — PROGRESS NOTES
[facility] Medicine  Progress Note    Patient Name: Irvin Diaz Jr.  MRN: [id number]  Patient Class: IP- Inpatient   Admission Date: 2/6/2025  Length of Stay: 3 days  Attending Physician: Fili Mast MD  Primary Care Provider: Edouard Gibson MD        Subjective     Principal Problem:Bacteremia due to Staphylococcus        HPI:  Irvin Diaz is a 50 y/o male with PMH of HTN, CKD3, CHF, GERD, alcoholic cirrhosis, diverticulitis, and prostatitis who presented to the ED yesterday with c/o blood in his urine and left ankle swelling/pain. States that he has been following a urologist outpatient for an infection in his urine since October and was treated with antibiotics but the symptoms have returned. Blood cultures from 2/5 showed bacteremia d/t staphylococcus and he was called to come back to ED for bacteremia. He endorses continued left ankle swelling ever since a previous hip fracture but states that it has become progressively worse in the last 1-2 weeks associated with pain and warmth. Denies shortness of breath, chest pain, palpitations, N/V/D. Denies difficulty urinating or incomplete emptying, fever or chills. CT ankle shows soft tissue edema.  Repeat blood cultures were drawn on admission.  The blood cultures from 02/05 0 MSSA.  He was placed on IV Rocephin on admission.   Admitted to hospital medicine for further evaluation and management.  Given resistant bacteria now bacteremia consult has been placed ID.    Overview/Hospital Course:  Pt was monitored closely during his stay.  He was maintained on IV Abx.  Id was consulted.  An was done to the left ankle rule out osteomyelitis-this was negative.  Repeat blood cultures were drawn on 02/07 with no growth to date.  An echo was ordered evaluate for valvular vegetations which was negative. He CT of the abdomen and pelvis with IV contrast to rule out prostate abscess.  Due to his CKD, he was hydrated pre and  postprocedure.  CT was negative for any abscess.  He did have enlarged prostate.  He was continued on IV oxacillin per ID recommendations.  Cardiology was consulted for ALLAN to ensure no valvular vegetations-this was negative.  ID followed closely. They made outpatient IV medication recommendations.    Interval History:  Pt seen and examined.  Feeling good.  ALLAN negative.  ID following- plan for DC tomorrow with IV antibiotics.    Review of Systems   Constitutional:  Negative for chills and fever.   Respiratory:  Negative for cough and shortness of breath.    Cardiovascular:  Positive for leg swelling. Negative for chest pain.   Gastrointestinal:  Negative for diarrhea, nausea and vomiting.   Genitourinary:  Negative for difficulty urinating and dysuria.     Objective:     Vital Signs (Most Recent):  Temp: 98.2 °F (36.8 °C) (02/10/25 1514)  Pulse: (!) 59 (02/10/25 1514)  Resp: 16 (02/10/25 1152)  BP: 132/71 (02/10/25 1514)  SpO2: 99 % (02/10/25 1514) Vital Signs (24h Range):  Temp:  [98 °F (36.7 °C)-98.8 °F (37.1 °C)] 98.2 °F (36.8 °C)  Pulse:  [59-74] 59  Resp:  [7-22] 16  SpO2:  [96 %-100 %] 99 %  BP: (106-157)/(56-92) 132/71     Weight: 109.3 kg (240 lb 14.4 oz)  Body mass index is 32.67 kg/m².    Intake/Output Summary (Last 24 hours) at 2/10/2025 1544  Last data filed at 2/10/2025 1345  Gross per 24 hour   Intake 550 ml   Output 210 ml   Net 340 ml         Physical Exam  Vitals and nursing note reviewed.   Constitutional:       Appearance: Normal appearance.   Cardiovascular:      Rate and Rhythm: Normal rate and regular rhythm.   Pulmonary:      Effort: Pulmonary effort is normal.      Breath sounds: Normal breath sounds.   Abdominal:      General: Abdomen is flat. Bowel sounds are normal.      Palpations: Abdomen is soft.   Musculoskeletal:      Cervical back: Normal range of motion and neck supple.      Left lower leg: Edema (LLE ankle and dorsum of the foot with swelling, decreased erythema and warmth to  touch) present.   Skin:     General: Skin is warm and dry.      Capillary Refill: Capillary refill takes less than 2 seconds.   Neurological:      General: No focal deficit present.      Mental Status: He is alert and oriented to person, place, and time. Mental status is at baseline.             Significant Labs: All pertinent labs within the past 24 hours have been reviewed.  CBC:   Recent Labs   Lab 02/09/25  0353 02/10/25  0439   WBC 9.04 11.36   HGB 9.2* 9.5*   HCT 28.2* 28.8*   PLT 95* 113*     CMP:   Recent Labs   Lab 02/09/25  0353 02/10/25  0439    135*   K 4.4 4.3    107   CO2 23 24   * 97   BUN 18 19   CREATININE 1.7* 1.8*   CALCIUM 8.3* 8.1*   PROT 6.8 6.8   ALBUMIN 2.4* 2.4*   BILITOT 2.4* 2.2*   ALKPHOS 91 90   AST 29 27   ALT 19 18   ANIONGAP 5* 4*       Significant Imaging: I have reviewed all pertinent imaging results/findings within the past 24 hours.    Assessment and Plan     * Bacteremia due to Staphylococcus  - Pt with MSSA on blood cultures from 02/05.  Repeat blood cultures drawn 2/7   - Pt placed on IV Rocephin on admit adjusted to oxacillin infusion. Changed to cefazolin 2/10.  - urine cultures growing the same felt to be related to prostatitis.  Recently seen by Urology in clinic-she comments on continued presence of staph in urine despite multiple courses of treatment and was considering a prolonged and multidrug course prior to admission.  - given recurrent infection, now bacteremia-we will consult with ID to ensure appropriate timing and choice of antibiotic therapy  - ECHO ordered to rule out vegetation .  Negative.  Discussed with ID-ALLAN negative.  -MRI negative for fluid collection or Osteo.  -CT with no definitive abscess in the prostate, but does have enlarged prostate        Cellulitis of foot, left  Pt with left ankle and hindfoot swelling, erythema, and warmth   - +/- gout component. Continue steroids          Acute on chronic prostatitis  -urine culture  "growing MSSA  -ID adjusted to cefazolin 2/10 which he will go home on.  -ID consultation.  CT abdomen and pelvis with enlarged prostate, no definitive fluid collection.  -will need continued follow-up with Urology as an outpatient ensure resolution      Congestive heart failure, unspecified HF chronicity, unspecified heart failure type  Patient has Diastolic (HFpEF) heart failure that is Chronic. On presentation their CHF was well compensated. Most recent BNP and echo results are listed below.  No results for input(s): "BNP" in the last 72 hours.  Latest ECHO  Results for orders placed during the hospital encounter of 03/27/24    Echo Saline Bubble? No    Interpretation Summary    Left Ventricle: The left ventricle is normal in size. Normal wall thickness. There is concentric hypertrophy. There is normal systolic function. Biplane (2D) method of discs ejection fraction is 57%. There is normal diastolic function.    Right Ventricle: Normal right ventricular cavity size. Wall thickness is normal. Right ventricle wall motion  is normal. Systolic function is normal.    Left Atrium: Left atrium is mildly dilated.    Mitral Valve: There is mild to moderate regurgitation with a centrally directed jet.    Pulmonary Artery: The estimated pulmonary artery systolic pressure is 19 mmHg.    IVC/SVC: Normal venous pressure at 3 mmHg.    Current Heart Failure Medications       Plan  - Monitor strict I&Os and daily weights.    - Place on telemetry  - Low sodium diet  - appears euvolemic  - new echocardiogram ordered, pending results         Hyperbilirubinemia  Pt with hx of alcoholic cirrhosis  - followed by Neurology and liver transplant-has been denied for liver transplant due to timing of sobriety   -encouraged continued ETOH cessation  -bilirubin level stable with elevated baseline    CKD (chronic kidney disease) stage 3, GFR 30-59 ml/min  Creatine stable for now. BMP reviewed- noted Estimated Creatinine Clearance: 56.3 mL/min " (A) (based on SCr of 2 mg/dL (H)). according to latest data. Based on current GFR, CKD stage is stage 3 - GFR 30-59.  Monitor UOP and serial BMP and adjust therapy as needed. Renally dose meds. Avoid nephrotoxic medications and procedures.    Essential hypertension  Patient's blood pressure range in the last 24 hours was: BP  Min: 106/53  Max: 181/75.The patient's inpatient anti-hypertensive regimen is listed below:  Current Antihypertensives   Metoprolol    Plan  - BP is controlled, no changes needed to their regimen      VTE Risk Mitigation (From admission, onward)           Ordered     enoxaparin injection 40 mg  Daily         02/07/25 0824     IP VTE HIGH RISK PATIENT  Once         02/07/25 0824     Place sequential compression device  Until discontinued         02/07/25 0824                    Discharge Planning   RADHA: 2/11/2025     Code Status: Full Code   Medical Readiness for Discharge Date:   Discharge Plan A: Home with family                        Purnima Valenzuela NP  Department of Hospital Medicine   [facility]

## 2025-02-10 NOTE — PLAN OF CARE
[facility]   Department of Infectious Disease    PATIENT NAME: Irvin Diaz Jr.  MRN: [id number]  TODAY'S DATE: 02/10/2025  ADMIT DATE: 2/6/2025  LENGTH OF STAY: 3 DAYS  PROJECTED DISCHARGE DATE: 02/10/2025      OUTPATIENT PARENTERAL ANTIBIOTIC THERAPY PLAN:       Case Management: Please send referral to Home Health and/or Home Infusion Agencies     1) Diagnosis:  Staph aureus bacteremia and recurrent UTI with prostatitis     2) Discharge Antibiotics:  IV antibiotics: cefazolin 8 g by continuous infusion over 24 hours daily  for 4 weeks  (Monitor Creatine clearance, currently 56.4, if crcl decreases to < 50, cefazolin will need to decrease to 6 grams IV over 24 hours by continuous infusion  daily)    3) Therapy Duration:    Estimated end date of IV antibiotics:  March 7th    4) Intravenous Access:  PICC/midline placed on:  02/10/2025  Routine PICC/midline care for duration of antibiotic therapy with weekly dressing changes per protocol  Remove PICC/midline at completion of antibiotics    5) Outpatient Weekly Labs for duration of antibiotic therapy: (labs ordered)  Order the following labs to be drawn on Mondays:  CBC with Diff, CMP, ESR and CRP      6) Fax Lab Results to Infectious Diseases Provider if not done at [facility] facility:   [facility] Fax Number: [phone], Attn: Dr Knott     7) Outpatient Infectious Diseases Follow-up: Dr Knott in 3-4 weeks  around March 7 (message sent)  Follow-up appointment will be arranged by the ID clinic and will be found in the patient's appointments tab.  Prior to discharge, please ensure the patient's follow-up has been scheduled.    If there is still no follow-up scheduled prior to discharge, please send an EPIC message to Susi Saucedo in [facility].      8) Miscellaneous Orders:   Follow-up with Urology and PCP        Nerissa Baptiste NP 02/10/2025  3:25 PM  [facility]    This note was  created using [employer]  voice recognition software that occasionally misinterpreted phrases or words.

## 2025-02-10 NOTE — SUBJECTIVE & OBJECTIVE
Interval History:  Pt seen and examined.  Feeling good.  ALLAN negative.  ID following- plan for DC tomorrow with IV antibiotics.    Review of Systems   Constitutional:  Negative for chills and fever.   Respiratory:  Negative for cough and shortness of breath.    Cardiovascular:  Positive for leg swelling. Negative for chest pain.   Gastrointestinal:  Negative for diarrhea, nausea and vomiting.   Genitourinary:  Negative for difficulty urinating and dysuria.     Objective:     Vital Signs (Most Recent):  Temp: 98.2 °F (36.8 °C) (02/10/25 1514)  Pulse: (!) 59 (02/10/25 1514)  Resp: 16 (02/10/25 1152)  BP: 132/71 (02/10/25 1514)  SpO2: 99 % (02/10/25 1514) Vital Signs (24h Range):  Temp:  [98 °F (36.7 °C)-98.8 °F (37.1 °C)] 98.2 °F (36.8 °C)  Pulse:  [59-74] 59  Resp:  [7-22] 16  SpO2:  [96 %-100 %] 99 %  BP: (106-157)/(56-92) 132/71     Weight: 109.3 kg (240 lb 14.4 oz)  Body mass index is 32.67 kg/m².    Intake/Output Summary (Last 24 hours) at 2/10/2025 1544  Last data filed at 2/10/2025 1345  Gross per 24 hour   Intake 550 ml   Output 210 ml   Net 340 ml         Physical Exam  Vitals and nursing note reviewed.   Constitutional:       Appearance: Normal appearance.   Cardiovascular:      Rate and Rhythm: Normal rate and regular rhythm.   Pulmonary:      Effort: Pulmonary effort is normal.      Breath sounds: Normal breath sounds.   Abdominal:      General: Abdomen is flat. Bowel sounds are normal.      Palpations: Abdomen is soft.   Musculoskeletal:      Cervical back: Normal range of motion and neck supple.      Left lower leg: Edema (LLE ankle and dorsum of the foot with swelling, decreased erythema and warmth to touch) present.   Skin:     General: Skin is warm and dry.      Capillary Refill: Capillary refill takes less than 2 seconds.   Neurological:      General: No focal deficit present.      Mental Status: He is alert and oriented to person, place, and time. Mental status is at baseline.              Significant Labs: All pertinent labs within the past 24 hours have been reviewed.  CBC:   Recent Labs   Lab 02/09/25  0353 02/10/25  0439   WBC 9.04 11.36   HGB 9.2* 9.5*   HCT 28.2* 28.8*   PLT 95* 113*     CMP:   Recent Labs   Lab 02/09/25  0353 02/10/25  0439    135*   K 4.4 4.3    107   CO2 23 24   * 97   BUN 18 19   CREATININE 1.7* 1.8*   CALCIUM 8.3* 8.1*   PROT 6.8 6.8   ALBUMIN 2.4* 2.4*   BILITOT 2.4* 2.2*   ALKPHOS 91 90   AST 29 27   ALT 19 18   ANIONGAP 5* 4*       Significant Imaging: I have reviewed all pertinent imaging results/findings within the past 24 hours.

## 2025-02-10 NOTE — PLAN OF CARE
IV abx recommendations sent to [facility] via gregory Wells (liaison) notified of above     02/10/25 8862   Post-Acute Status   Post-Acute Authorization IV Infusion   IV Infusion Status Referral(s) sent

## 2025-02-11 VITALS
DIASTOLIC BLOOD PRESSURE: 75 MMHG | HEIGHT: 72 IN | HEART RATE: 69 BPM | TEMPERATURE: 98 F | RESPIRATION RATE: 18 BRPM | SYSTOLIC BLOOD PRESSURE: 132 MMHG | OXYGEN SATURATION: 97 % | WEIGHT: 240.88 LBS | BODY MASS INDEX: 32.63 KG/M2

## 2025-02-11 LAB
ALBUMIN SERPL BCP-MCNC: 2.4 G/DL (ref 3.5–5.2)
ALP SERPL-CCNC: 84 U/L (ref 55–135)
ALT SERPL W/O P-5'-P-CCNC: 16 U/L (ref 10–44)
ANION GAP SERPL CALC-SCNC: 2 MMOL/L (ref 8–16)
AST SERPL-CCNC: 25 U/L (ref 10–40)
BASOPHILS # BLD AUTO: 0.04 K/UL (ref 0–0.2)
BASOPHILS NFR BLD: 0.3 % (ref 0–1.9)
BILIRUB SERPL-MCNC: 2 MG/DL (ref 0.1–1)
BUN SERPL-MCNC: 20 MG/DL (ref 6–20)
CALCIUM SERPL-MCNC: 8 MG/DL (ref 8.7–10.5)
CHLORIDE SERPL-SCNC: 109 MMOL/L (ref 95–110)
CO2 SERPL-SCNC: 26 MMOL/L (ref 23–29)
CREAT SERPL-MCNC: 2 MG/DL (ref 0.5–1.4)
DIFFERENTIAL METHOD BLD: ABNORMAL
EOSINOPHIL # BLD AUTO: 0 K/UL (ref 0–0.5)
EOSINOPHIL NFR BLD: 0.3 % (ref 0–8)
ERYTHROCYTE [DISTWIDTH] IN BLOOD BY AUTOMATED COUNT: 17 % (ref 11.5–14.5)
EST. GFR  (NO RACE VARIABLE): 39.9 ML/MIN/1.73 M^2
GLUCOSE SERPL-MCNC: 90 MG/DL (ref 70–110)
HCT VFR BLD AUTO: 28.9 % (ref 40–54)
HGB BLD-MCNC: 9.3 G/DL (ref 14–18)
IMM GRANULOCYTES # BLD AUTO: 0.21 K/UL (ref 0–0.04)
IMM GRANULOCYTES NFR BLD AUTO: 1.8 % (ref 0–0.5)
LYMPHOCYTES # BLD AUTO: 2.2 K/UL (ref 1–4.8)
LYMPHOCYTES NFR BLD: 18.6 % (ref 18–48)
MAGNESIUM SERPL-MCNC: 2 MG/DL (ref 1.6–2.6)
MCH RBC QN AUTO: 33.7 PG (ref 27–31)
MCHC RBC AUTO-ENTMCNC: 32.2 G/DL (ref 32–36)
MCV RBC AUTO: 105 FL (ref 82–98)
MONOCYTES # BLD AUTO: 1.1 K/UL (ref 0.3–1)
MONOCYTES NFR BLD: 9.7 % (ref 4–15)
NEUTROPHILS # BLD AUTO: 8.2 K/UL (ref 1.8–7.7)
NEUTROPHILS NFR BLD: 69.3 % (ref 38–73)
NRBC BLD-RTO: 0 /100 WBC
PLATELET # BLD AUTO: 107 K/UL (ref 150–450)
PMV BLD AUTO: 9.9 FL (ref 9.2–12.9)
POTASSIUM SERPL-SCNC: 4.3 MMOL/L (ref 3.5–5.1)
PROT SERPL-MCNC: 6.8 G/DL (ref 6–8.4)
RBC # BLD AUTO: 2.76 M/UL (ref 4.6–6.2)
SODIUM SERPL-SCNC: 137 MMOL/L (ref 136–145)
WBC # BLD AUTO: 11.8 K/UL (ref 3.9–12.7)

## 2025-02-11 PROCEDURE — 80053 COMPREHEN METABOLIC PANEL: CPT | Performed by: NURSE PRACTITIONER

## 2025-02-11 PROCEDURE — 85025 COMPLETE CBC W/AUTO DIFF WBC: CPT | Performed by: NURSE PRACTITIONER

## 2025-02-11 PROCEDURE — 36415 COLL VENOUS BLD VENIPUNCTURE: CPT | Performed by: NURSE PRACTITIONER

## 2025-02-11 PROCEDURE — 83735 ASSAY OF MAGNESIUM: CPT | Performed by: NURSE PRACTITIONER

## 2025-02-11 PROCEDURE — 25000003 PHARM REV CODE 250: Performed by: NURSE PRACTITIONER

## 2025-02-11 PROCEDURE — 63600175 PHARM REV CODE 636 W HCPCS: Performed by: NURSE PRACTITIONER

## 2025-02-11 PROCEDURE — 25000003 PHARM REV CODE 250: Performed by: INTERNAL MEDICINE

## 2025-02-11 RX ORDER — CEFAZOLIN 2 G/1
2 INJECTION, POWDER, FOR SOLUTION INTRAMUSCULAR; INTRAVENOUS EVERY 6 HOURS
Start: 2025-02-11

## 2025-02-11 RX ORDER — PREDNISONE 20 MG/1
20 TABLET ORAL DAILY
Qty: 2 TABLET | Refills: 0 | Status: SHIPPED | OUTPATIENT
Start: 2025-02-11 | End: 2025-02-13

## 2025-02-11 RX ADMIN — METOPROLOL TARTRATE 50 MG: 50 TABLET, FILM COATED ORAL at 08:02

## 2025-02-11 RX ADMIN — CEFAZOLIN 2 G: 2 INJECTION, POWDER, FOR SOLUTION INTRAMUSCULAR; INTRAVENOUS at 04:02

## 2025-02-11 RX ADMIN — MUPIROCIN 1 G: 20 OINTMENT TOPICAL at 08:02

## 2025-02-11 RX ADMIN — SPIRONOLACTONE 25 MG: 25 TABLET, FILM COATED ORAL at 08:02

## 2025-02-11 RX ADMIN — CEFAZOLIN 2 G: 2 INJECTION, POWDER, FOR SOLUTION INTRAMUSCULAR; INTRAVENOUS at 10:02

## 2025-02-11 RX ADMIN — PREDNISONE 20 MG: 20 TABLET ORAL at 08:02

## 2025-02-11 RX ADMIN — TRAMADOL HYDROCHLORIDE 50 MG: 50 TABLET, COATED ORAL at 08:02

## 2025-02-11 RX ADMIN — FAMOTIDINE 20 MG: 10 INJECTION, SOLUTION INTRAVENOUS at 08:02

## 2025-02-11 NOTE — PLAN OF CARE
TRUDY sent HH referral to [facility] . Patient choice form signed and scanned into [employer].      02/11/25 1056   Post-Acute Status   Post-Acute Authorization Home Health   Home Health Status Referrals Sent   Patient choice form signed by patient/caregiver List with quality metrics by geographic area provided   Discharge Delays None known at this time   Discharge Plan   Discharge Plan A Home Health   Discharge Plan B Home Health

## 2025-02-11 NOTE — DISCHARGE INSTRUCTIONS
Discharge Instructions, [facility]    Thank you for choosing [facility] for your medical care. The primary doctor who is taking care of you at the time of your discharge is Fili Mast MD.     You were admitted to the hospital with Bacteremia due to Staphylococcus.     Please note your discharge instructions, including diet/activity restrictions, follow-up appointments, and medication changes.  If you have any questions about your medical issues, prescriptions, or any other questions, please feel free to contact the [facility] Medicine Dept at [phone] and we will help.    If you are previously with Home health, outpatient PT/OT or under a therapy program, you are cleared to return to those programs.    Please direct all long term medication refills and follow up to your primary care provider, Edouard Gibson MD. Thank you again for letting us take care of your health care needs.    Please note the following discharge instructions per your discharging physician-  Purnima Valenzuela NP

## 2025-02-11 NOTE — PLAN OF CARE
Spoke with Patient verbally over the phone-([phone]), regarding discharge IMM, Understands statement and IMM will be scanned to chart.

## 2025-02-11 NOTE — NURSING
Patient D/C via MD order. Telemetry and PIV removed. Discharge instructions reviewed with patient. Patient transported to [facility] via W/C.

## 2025-02-11 NOTE — PLAN OF CARE
Discharge orders and chart reviewed with no further post-acute discharge needs identified at this time.  At this time, patient is cleared for discharge from Case Management standpoint.     HH set up with [facility], SOC: 2/12/2025. IV infustion set up with [facility], teaching has been done at bedside with patient.        02/11/25 1119   Final Note   Assessment Type Final Discharge Note   Anticipated Discharge Disposition [facility] Resources/Appts/Education Provided Appointments scheduled and added to AVS;Provided education on problems/symptoms using teachback   Post-Acute Status   Post-Acute Authorization Home Health;IV Infusion   Home Health Status Set-up Complete/Auth obtained   IV Infusion Status Set-up Complete/Auth obtained   Discharge Delays None known at this time

## 2025-02-11 NOTE — PROGRESS NOTES
Pt to receive education on home antibiotics through PICC line. Priscilla to be her around lunch time.

## 2025-02-11 NOTE — DISCHARGE SUMMARY
[facility] Medicine  Discharge Summary      Patient Name: Irvin Diaz Jr.  MRN: [id number]  SOLEDAD: [id number]  Patient Class: IP- Inpatient  Admission Date: 2/6/2025  Hospital Length of Stay: 4 days  Discharge Date and Time:  02/11/2025 11:45 AM  Attending Physician: Fili Mast MD   Discharging Provider: Purnima Valenzuela NP  Primary Care Provider: Edouard Gibson MD    Primary Care Team: Networked reference to record PCT     HPI:   Irvin Diaz is a 48 y/o male with PMH of HTN, CKD3, CHF, GERD, alcoholic cirrhosis, diverticulitis, and prostatitis who presented to the ED yesterday with c/o blood in his urine and left ankle swelling/pain. States that he has been following a urologist outpatient for an infection in his urine since October and was treated with antibiotics but the symptoms have returned. Blood cultures from 2/5 showed bacteremia d/t staphylococcus and he was called to come back to ED for bacteremia. He endorses continued left ankle swelling ever since a previous hip fracture but states that it has become progressively worse in the last 1-2 weeks associated with pain and warmth. Denies shortness of breath, chest pain, palpitations, N/V/D. Denies difficulty urinating or incomplete emptying, fever or chills. CT ankle shows soft tissue edema.  Repeat blood cultures were drawn on admission.  The blood cultures from 02/05 0 MSSA.  He was placed on IV Rocephin on admission.   Admitted to hospital medicine for further evaluation and management.  Given resistant bacteria now bacteremia consult has been placed ID.    Procedure(s) (LRB):  Transesophageal echo (ALLAN) intra-procedure log documentation (N/A)      Hospital Course:   Pt was monitored closely during his stay.  He was maintained on IV Abx.  Id was consulted.  An was done to the left ankle rule out osteomyelitis-this was negative.  Repeat blood cultures were drawn on 02/07 with no growth to date.  An echo was  ordered evaluate for valvular vegetations which was negative. He CT of the abdomen and pelvis with IV contrast to rule out prostate abscess.  Due to his CKD, he was hydrated pre and postprocedure.  CT was negative for any abscess.  He did have enlarged prostate.  He was continued on IV oxacillin per ID recommendations.  Cardiology was consulted for ALLAN to ensure no valvular vegetations-this was negative.  ID followed closely. They made outpatient IV antibiotic recommendations including transitioned to Ancef.  Pt remained stable here in the hospital.  He was D/C to complete a very short course of steroids for the gout which has helped tremendously.  The PICC line was in place for outpatient Ancef infusion for 4 weeks.  Return precautions were discussed.  Pt verbalized understanding and agreement with discharge plan    He was seen on day of discharge and deemed appropriate for discharge.     Goals of Care Treatment Preferences:  Code Status: Full Code      SDOH Screening:  The patient was screened for utility difficulties, food insecurity, transport difficulties, housing insecurity, and interpersonal safety and there were no concerns identified this admission.     Consults:   Consults (From admission, onward)          Status Ordering Provider     Inpatient consult to Social Work/Case Management  Once        Provider:  (Not yet assigned)    Acknowledged UYEN CONROY     Inpatient consult to PICC team (NIAS)  Once        Provider:  (Not yet assigned)    Completed ALEX ELLISON     Inpatient consult to Cardiology  Once        Provider:  Yunior Abreu MD    Completed ALEX ELLISON     Inpatient consult to Infectious Diseases  Once        Provider:  Telly Knott MD Completed SUIT, JESSICA M.            * Bacteremia due to Staphylococcus  - Pt with MSSA on blood cultures from 02/05.  Repeat blood cultures drawn 2/7   - Pt placed on IV Rocephin on admit adjusted to oxacillin infusion. Changed to  "cefazolin 2/10.  - urine cultures growing the same felt to be related to prostatitis.  Recently seen by Urology in clinic-she comments on continued presence of staph in urine despite multiple courses of treatment and was considering a prolonged and multidrug course prior to admission.  - given recurrent infection, now bacteremia-we will consult with ID to ensure appropriate timing and choice of antibiotic therapy  - ECHO ordered to rule out vegetation .  Negative.  Discussed with ID-ALLAN negative.  -MRI negative for fluid collection or Osteo.  -CT with no definitive abscess in the prostate, but does have enlarged prostate        Cellulitis of foot, left  Pt with left ankle and hindfoot swelling, erythema, and warmth   - +/- gout component. Continue steroids          Acute on chronic prostatitis  -urine culture growing MSSA  -ID adjusted to cefazolin 2/10 which he will go home on.  -ID consultation.  CT abdomen and pelvis with enlarged prostate, no definitive fluid collection.  -will need continued follow-up with Urology as an outpatient ensure resolution      Congestive heart failure, unspecified HF chronicity, unspecified heart failure type  Patient has Diastolic (HFpEF) heart failure that is Chronic. On presentation their CHF was well compensated. Most recent BNP and echo results are listed below.  No results for input(s): "BNP" in the last 72 hours.  Latest ECHO  Results for orders placed during the hospital encounter of 03/27/24    Echo Saline Bubble? No    Interpretation Summary    Left Ventricle: The left ventricle is normal in size. Normal wall thickness. There is concentric hypertrophy. There is normal systolic function. Biplane (2D) method of discs ejection fraction is 57%. There is normal diastolic function.    Right Ventricle: Normal right ventricular cavity size. Wall thickness is normal. Right ventricle wall motion  is normal. Systolic function is normal.    Left Atrium: Left atrium is mildly dilated.    " Mitral Valve: There is mild to moderate regurgitation with a centrally directed jet.    Pulmonary Artery: The estimated pulmonary artery systolic pressure is 19 mmHg.    IVC/SVC: Normal venous pressure at 3 mmHg.    Current Heart Failure Medications       Plan  - Monitor strict I&Os and daily weights.    - Place on telemetry  - Low sodium diet  - appears euvolemic  - new echocardiogram ordered, pending results         Hyperbilirubinemia  Pt with hx of alcoholic cirrhosis  - followed by Neurology and liver transplant-has been denied for liver transplant due to timing of sobriety   -encouraged continued ETOH cessation  -bilirubin level stable with elevated baseline    CKD (chronic kidney disease) stage 3, GFR 30-59 ml/min  Creatine stable for now. BMP reviewed- noted Estimated Creatinine Clearance: 56.3 mL/min (A) (based on SCr of 2 mg/dL (H)). according to latest data. Based on current GFR, CKD stage is stage 3 - GFR 30-59.  Monitor UOP and serial BMP and adjust therapy as needed. Renally dose meds. Avoid nephrotoxic medications and procedures.    Essential hypertension  Patient's blood pressure range in the last 24 hours was: BP  Min: 106/53  Max: 181/75.The patient's inpatient anti-hypertensive regimen is listed below:  Current Antihypertensives   Metoprolol    Plan  - BP is controlled, no changes needed to their regimen      Final Active Diagnoses:    Diagnosis Date Noted POA    PRINCIPAL PROBLEM:  Bacteremia due to Staphylococcus [R78.81, B95.8] 02/07/2025 Yes    Acute on chronic prostatitis [N41.0, N41.1] 02/07/2025 Yes    Cellulitis of foot, left [L03.116] 02/07/2025 Yes    Congestive heart failure, unspecified HF chronicity, unspecified heart failure type [I50.9] 01/29/2025 Yes    Hyperbilirubinemia [E80.6] 07/16/2021 Yes    CKD (chronic kidney disease) stage 3, GFR 30-59 ml/min [N18.30] 04/08/2020 Yes     Chronic    Essential hypertension [I10]  Yes     Chronic      Problems Resolved During this Admission:        Discharged Condition: good    Disposition: Home or Self Care    Follow Up:   Follow-up Information       Edouard Gibson MD. Go on 2/18/2025.    Specialties: Family Medicine, Hospitalist  Why: hospital follow up appointment scheduled at 9:20 AM  Contact information:  [address]  [place] [address]  [phone]               Telly Knott MD. Go on 3/5/2025.    Specialty: Infectious Diseases  Why: hospital follow up appointment scheduled  Contact information:  [address] [facility] [address]  [phone]               [facility] Follow up on 2/12/2025.    Specialties: Home Health Services, Home Therapy Services, Home Living Aide Services  Contact information:  [address] [address]  [phone]                         Patient Instructions:      C-Reactive Protein   Standing Status: Standing Number of Occurrences: 4 Standing Exp. Date: 04/11/26     Comprehensive Metabolic Panel   Standing Status: Standing Number of Occurrences: 4 Standing Exp. Date: 04/11/26     CBC Auto Differential   Standing Status: Standing Number of Occurrences: 4 Standing Exp. Date: 04/11/26     Sedimentation rate   Standing Status: Standing Number of Occurrences: 4 Standing Exp. Date: 05/11/26     Order Specific Question Answer Comments   Send normal result to authorizing provider's In Basket if patient is active on MyChart: Yes      Ambulatory referral/consult to Home Health   Standing Status: Future   Referral Priority: Routine Referral Type: Home Health   Referral Reason: Specialty Services Required   Requested Specialty: Home Health Services   Number of Visits Requested: 1     Diet Adult Regular     Notify your health care provider if you experience any of the following:  temperature >100.4     Notify your health care provider if you experience any of the following:  persistent nausea and vomiting or diarrhea     Notify your health care provider if you experience  any of the following:  severe uncontrolled pain     Notify your health care provider if you experience any of the following:  redness, tenderness, or signs of infection (pain, swelling, redness, odor or green/yellow discharge around incision site)     Notify your health care provider if you experience any of the following:  difficulty breathing or increased cough     Activity as tolerated       Significant Diagnostic Studies: Labs: CMP   Recent Labs   Lab 02/10/25  0439 02/11/25  0502   * 137   K 4.3 4.3    109   CO2 24 26   GLU 97 90   BUN 19 20   CREATININE 1.8* 2.0*   CALCIUM 8.1* 8.0*   PROT 6.8 6.8   ALBUMIN 2.4* 2.4*   BILITOT 2.2* 2.0*   ALKPHOS 90 84   AST 27 25   ALT 18 16   ANIONGAP 4* 2*    and CBC   Recent Labs   Lab 02/10/25  0439 02/11/25  0502   WBC 11.36 11.80   HGB 9.5* 9.3*   HCT 28.8* 28.9*   * 107*       Pending Diagnostic Studies:       None           Medications:  Reconciled Home Medications:      Medication List        START taking these medications      ceFAZolin 2 gram Solr  Inject 2,000 mg (2 g total) into the vein every 6 (six) hours.     predniSONE 20 MG tablet  Commonly known as: DELTASONE  Take 1 tablet (20 mg total) by mouth once daily. for 2 days            CONTINUE taking these medications      ferrous gluconate 240 (27 FE) MG tablet  Commonly known as: FERATE  Take 2 tablets (480 mg total) by mouth 2 (two) times daily with meals.     folic acid 1 MG tablet  Commonly known as: FOLVITE  Take 1 tablet (1 mg total) by mouth once daily.     HYDROcodone-acetaminophen 5-325 mg per tablet  Commonly known as: NORCO  Take 1 tablet by mouth every 6 (six) hours as needed for Pain.     methocarbamoL 500 MG Tab  Commonly known as: Robaxin  Take 1 tablet (500 mg total) by mouth 4 (four) times daily. for 10 days     metoprolol tartrate 50 MG tablet  Commonly known as: LOPRESSOR  Take 1 tablet (50 mg total) by mouth 2 (two) times daily. Do not take if heart rate is less than 60      nortriptyline 25 MG capsule  Commonly known as: PAMELOR  Take 1 capsule (25 mg total) by mouth every evening.     papaverine 30 mg/mL injection  Add phentolamine 10 mgs/ml., add PGE1  100 micrograms.     spironolactone 25 MG tablet  Commonly known as: ALDACTONE  Take 1 tablet (25 mg total) by mouth once daily.     tadalafiL 20 MG Tab  Commonly known as: CIALIS  Take 1 tablet (20 mg total) by mouth daily as needed (erectile dysfunction).     thiamine 100 MG tablet  Take 1 tablet (100 mg total) by mouth once daily.     traMADoL 50 mg tablet  Commonly known as: ULTRAM  Take 1 tablet (50 mg total) by mouth 4 (four) times daily as needed for Pain.     XIFAXAN 550 mg Tab  Generic drug: rifAXIMin  Take 1 tablet (550 mg total) by mouth 2 (two) times daily.            STOP taking these medications      amoxicillin-clavulanate 875-125mg 875-125 mg per tablet  Commonly known as: AUGMENTIN     doxycycline 100 MG Cap  Commonly known as: VIBRAMYCIN     sulfamethoxazole-trimethoprim 400-80mg 400-80 mg per tablet  Commonly known as: BACTRIM,SEPTRA     valsartan 40 MG tablet  Commonly known as: DIOVAN            ASK your doctor about these medications      FARXIGA 10 mg tablet  Generic drug: dapagliflozin propanediol  Take 1 tablet (10 mg total) by mouth once daily.              Indwelling Lines/Drains at time of discharge:   Lines/Drains/Airways       Peripherally Inserted Central Catheter Line  Duration             PICC Double Lumen 02/10/25 1408 right basilic <1 day                    Time spent on the discharge of patient: 35 minutes         Purnima Valenzuela NP  Department of Hospital Medicine  [facility]

## 2025-02-11 NOTE — PLAN OF CARE
Per Priscilla (liaison) for [facility] - price for home infusions about $78/week. CM met with patient at bedside to notify of price - patient verbalized understanding. Priscilla to present to patient's bedside on today for teaching     02/11/25 0901   Post-Acute Status   Post-Acute Authorization IV Infusion   IV Infusion Status Awaiting delivery

## 2025-02-12 LAB
BACTERIA BLD CULT: NORMAL
BACTERIA BLD CULT: NORMAL

## 2025-02-17 ENCOUNTER — RESULTS FOLLOW-UP (OUTPATIENT)
Dept: INFECTIOUS DISEASES | Facility: HOSPITAL | Age: 50
End: 2025-02-17

## 2025-02-17 ENCOUNTER — LAB VISIT (OUTPATIENT)
Dept: LAB | Facility: HOSPITAL | Age: 50
End: 2025-02-17
Attending: INTERNAL MEDICINE
Payer: MEDICARE

## 2025-02-17 DIAGNOSIS — R78.81 BACTEREMIA: ICD-10-CM

## 2025-02-17 DIAGNOSIS — N41.0 ACUTE PROSTATITIS: Primary | ICD-10-CM

## 2025-02-17 DIAGNOSIS — B95.8 STAPHYLOCOCCAL INFECTIOUS DISEASE: ICD-10-CM

## 2025-02-17 LAB
ALBUMIN SERPL BCP-MCNC: 2.3 G/DL (ref 3.5–5.2)
ALP SERPL-CCNC: 94 U/L (ref 40–150)
ALT SERPL W/O P-5'-P-CCNC: 11 U/L (ref 10–44)
ANION GAP SERPL CALC-SCNC: 8 MMOL/L (ref 8–16)
AST SERPL-CCNC: 44 U/L (ref 10–40)
BASOPHILS # BLD AUTO: 0.02 K/UL (ref 0–0.2)
BASOPHILS NFR BLD: 0.3 % (ref 0–1.9)
BILIRUB SERPL-MCNC: 2 MG/DL (ref 0.1–1)
BUN SERPL-MCNC: 19 MG/DL (ref 6–20)
CALCIUM SERPL-MCNC: 8 MG/DL (ref 8.7–10.5)
CHLORIDE SERPL-SCNC: 110 MMOL/L (ref 95–110)
CO2 SERPL-SCNC: 19 MMOL/L (ref 23–29)
CREAT SERPL-MCNC: 1.5 MG/DL (ref 0.5–1.4)
CRP SERPL-MCNC: 2.7 MG/L (ref 0–8.2)
DIFFERENTIAL METHOD BLD: ABNORMAL
EOSINOPHIL # BLD AUTO: 0.1 K/UL (ref 0–0.5)
EOSINOPHIL NFR BLD: 1.8 % (ref 0–8)
ERYTHROCYTE [DISTWIDTH] IN BLOOD BY AUTOMATED COUNT: 16.9 % (ref 11.5–14.5)
ERYTHROCYTE [SEDIMENTATION RATE] IN BLOOD BY WESTERGREN METHOD: 60 MM/HR (ref 0–10)
EST. GFR  (NO RACE VARIABLE): 56 ML/MIN/1.73 M^2
GLUCOSE SERPL-MCNC: 107 MG/DL (ref 70–110)
HCT VFR BLD AUTO: 28.1 % (ref 40–54)
HGB BLD-MCNC: 9.1 G/DL (ref 14–18)
IMM GRANULOCYTES # BLD AUTO: 0.03 K/UL (ref 0–0.04)
IMM GRANULOCYTES NFR BLD AUTO: 0.5 % (ref 0–0.5)
LYMPHOCYTES # BLD AUTO: 1.1 K/UL (ref 1–4.8)
LYMPHOCYTES NFR BLD: 17.1 % (ref 18–48)
MCH RBC QN AUTO: 34.6 PG (ref 27–31)
MCHC RBC AUTO-ENTMCNC: 32.4 G/DL (ref 32–36)
MCV RBC AUTO: 107 FL (ref 82–98)
MONOCYTES # BLD AUTO: 0.6 K/UL (ref 0.3–1)
MONOCYTES NFR BLD: 9.5 % (ref 4–15)
NEUTROPHILS # BLD AUTO: 4.7 K/UL (ref 1.8–7.7)
NEUTROPHILS NFR BLD: 70.8 % (ref 38–73)
NRBC BLD-RTO: 0 /100 WBC
PLATELET # BLD AUTO: 54 K/UL (ref 150–450)
PLATELET BLD QL SMEAR: ABNORMAL
PMV BLD AUTO: 10.6 FL (ref 9.2–12.9)
POTASSIUM SERPL-SCNC: 4.1 MMOL/L (ref 3.5–5.1)
PROT SERPL-MCNC: 7.4 G/DL (ref 6–8.4)
RBC # BLD AUTO: 2.63 M/UL (ref 4.6–6.2)
SODIUM SERPL-SCNC: 137 MMOL/L (ref 136–145)
WBC # BLD AUTO: 6.56 K/UL (ref 3.9–12.7)

## 2025-02-17 PROCEDURE — 36415 COLL VENOUS BLD VENIPUNCTURE: CPT | Performed by: INTERNAL MEDICINE

## 2025-02-17 PROCEDURE — 86140 C-REACTIVE PROTEIN: CPT | Performed by: INTERNAL MEDICINE

## 2025-02-17 PROCEDURE — 85025 COMPLETE CBC W/AUTO DIFF WBC: CPT | Performed by: INTERNAL MEDICINE

## 2025-02-17 PROCEDURE — 85651 RBC SED RATE NONAUTOMATED: CPT | Performed by: INTERNAL MEDICINE

## 2025-02-17 PROCEDURE — 80053 COMPREHEN METABOLIC PANEL: CPT | Performed by: INTERNAL MEDICINE

## 2025-02-17 NOTE — PROGRESS NOTES
Yes,  Patient has a follow up appointment on   3/05/2025 at 10:20 am with Dr Nikos Ferguson [facility]  2/17/2025

## 2025-02-18 ENCOUNTER — OFFICE VISIT (OUTPATIENT)
Dept: PRIMARY CARE CLINIC | Facility: CLINIC | Age: 50
End: 2025-02-18
Payer: MEDICARE

## 2025-02-18 VITALS
BODY MASS INDEX: 35.04 KG/M2 | RESPIRATION RATE: 18 BRPM | OXYGEN SATURATION: 98 % | HEART RATE: 68 BPM | WEIGHT: 258.69 LBS | DIASTOLIC BLOOD PRESSURE: 64 MMHG | SYSTOLIC BLOOD PRESSURE: 126 MMHG | HEIGHT: 72 IN

## 2025-02-18 DIAGNOSIS — M25.572 PAIN AND SWELLING OF LEFT ANKLE: ICD-10-CM

## 2025-02-18 DIAGNOSIS — R78.81 BACTEREMIA: ICD-10-CM

## 2025-02-18 DIAGNOSIS — M25.472 PAIN AND SWELLING OF LEFT ANKLE: ICD-10-CM

## 2025-02-18 DIAGNOSIS — Z09 HOSPITAL DISCHARGE FOLLOW-UP: Primary | ICD-10-CM

## 2025-02-18 PROCEDURE — 99214 OFFICE O/P EST MOD 30 MIN: CPT | Mod: PBBFAC,PN | Performed by: STUDENT IN AN ORGANIZED HEALTH CARE EDUCATION/TRAINING PROGRAM

## 2025-02-18 NOTE — PROGRESS NOTES
Subjective:       Patient ID: Irvin Diaz Jr. is a 49 y.o. male.    Chief Complaint: Follow-up    HPI:  49 y.o. male presents to [facility] for follow-up of recent hospital visit due to need for IV antibiotics with bacteremia.     Echo was negative for vegetations. Repeat cultures 2/7/2025 negative for bacterial growth. CT was performed to rule out prostate abscess. Patient on Ancef outpatient through PICC line and IV steroid for Gout flare which was helping with pain.    Scheduled to see ID (Dr. Telly Knott) 3/5/2025  Home health present?: Yes, PICC line dressing was replaced 2/17/2024    Patient has noticed some improvement in swelling to left foot. Still having some persistent pains to region.    Will see Dr. Emerson with Orthopaedics 2/19/2025.  States he is still having pain and swelling to left ankle, but is improving.  March 24th 2025 will see hepatology for ongoing cirrhosis.    Review of Systems   Constitutional:  Negative for chills, diaphoresis, fatigue and fever.   Respiratory:  Negative for chest tightness and shortness of breath.    Cardiovascular:  Negative for chest pain and palpitations.   Gastrointestinal:  Negative for abdominal pain, diarrhea, nausea and vomiting.   Musculoskeletal:  Positive for arthralgias (Left hip and ankle) and joint swelling (Left ankle). Negative for myalgias.   Skin:  Negative for rash and wound.   Neurological:  Negative for weakness and numbness.       Objective:      Vitals:    02/18/25 0933   BP: 126/64   BP Location: Right arm   Patient Position: Sitting   Pulse: 68   Resp: 18   SpO2: 98%   Weight: 117.4 kg (258 lb 11.4 oz)   Height: 6' (1.829 m)     Physical Exam  Vitals reviewed.   Constitutional:       General: He is not in acute distress.     Appearance: Normal appearance. He is not ill-appearing.   HENT:      Head: Normocephalic and atraumatic.   Eyes:      General:         Right eye: No discharge.         Left eye: No discharge.       Conjunctiva/sclera: Conjunctivae normal.   Cardiovascular:      Rate and Rhythm: Normal rate.   Pulmonary:      Effort: Pulmonary effort is normal.   Musculoskeletal:         General: No deformity.      Right lower le+ Pitting Edema present.      Left lower le+ Pitting Edema present.   Skin:     Coloration: Skin is not jaundiced or pale.      Comments: PICC line site without evidence for infection   Neurological:      General: No focal deficit present.      Mental Status: He is alert and oriented to person, place, and time.   Psychiatric:         Mood and Affect: Mood normal.         Behavior: Behavior normal.             Lab Results   Component Value Date     2025     2018    K 4.1 2025     2025     2018    CO2 19 (L) 2025    BUN 19 2025    CREATININE 1.5 (H) 2025    CREATININE 0.91 2018    ANIONGAP 8 2025     Lab Results   Component Value Date    HGBA1C 4.8 2024     Lab Results   Component Value Date    BNP 88 2025     (H) 2024    BNP 48 2023       Lab Results   Component Value Date    WBC 6.56 2025    HGB 9.1 (L) 2025    HCT 28.1 (L) 2025    HCT 25 (L) 2022    PLT 54 (L) 2025    GRAN 4.7 2025    GRAN 70.8 2025     Lab Results   Component Value Date    CHOL 167 2024    HDL 28 (L) 2024    LDLCALC 128.0 2024    TRIG 55 2024        Current Medications[1]        Assessment:       1. Hospital discharge follow-up    2. Bacteremia    3. Pain and swelling of left ankle           Plan:       Hospital discharge follow-up  Bacteremia  Pain and swelling of left ankle  Pedal edema  - Much improved since hospital admit. Keep follow-up with Orthopaedics, ID, Pain Management, and Hepatology  - Informed patient that low protein associated with cirrhosis is likely at least partly contributing to edema in lower extremities and to discuss  recommendations on future management when seen in clinic again. Continue spironolactone    RTC in 4 months         [1]   Current Outpatient Medications:     ceFAZolin 2 gram SolR, Inject 2,000 mg (2 g total) into the vein every 6 (six) hours., Disp: , Rfl:     ferrous gluconate (FERATE) 240 (27 FE) MG tablet, Take 2 tablets (480 mg total) by mouth 2 (two) times daily with meals., Disp: 120 tablet, Rfl: 3    folic acid (FOLVITE) 1 MG tablet, Take 1 tablet (1 mg total) by mouth once daily., Disp: 30 tablet, Rfl: 5    HYDROcodone-acetaminophen (NORCO) 5-325 mg per tablet, Take 1 tablet by mouth every 6 (six) hours as needed for Pain., Disp: 15 tablet, Rfl: 0    metoprolol tartrate (LOPRESSOR) 50 MG tablet, Take 1 tablet (50 mg total) by mouth 2 (two) times daily. Do not take if heart rate is less than 60, Disp: 60 tablet, Rfl: 11    nortriptyline (PAMELOR) 25 MG capsule, Take 1 capsule (25 mg total) by mouth every evening., Disp: 90 capsule, Rfl: 3    papaverine 30 mg/mL injection, Add phentolamine 10 mgs/ml., add PGE1  100 micrograms., Disp: 10 mL, Rfl: 12    rifAXIMin (XIFAXAN) 550 mg Tab, Take 1 tablet (550 mg total) by mouth 2 (two) times daily., Disp: 60 tablet, Rfl: 11    spironolactone (ALDACTONE) 25 MG tablet, Take 1 tablet (25 mg total) by mouth once daily., Disp: 90 tablet, Rfl: 3    tadalafiL (CIALIS) 20 MG Tab, Take 1 tablet (20 mg total) by mouth daily as needed (erectile dysfunction)., Disp: 15 tablet, Rfl: 11    thiamine 100 MG tablet, Take 1 tablet (100 mg total) by mouth once daily., Disp: 30 tablet, Rfl: 5    traMADoL (ULTRAM) 50 mg tablet, Take 1 tablet (50 mg total) by mouth 4 (four) times daily as needed for Pain., Disp: 120 each, Rfl: 2    FARXIGA 10 mg tablet, Take 1 tablet (10 mg total) by mouth once daily. (Patient not taking: Reported on 2/7/2025), Disp: 90 tablet, Rfl: 3

## 2025-02-19 ENCOUNTER — OFFICE VISIT (OUTPATIENT)
Dept: ORTHOPEDICS | Facility: CLINIC | Age: 50
End: 2025-02-19
Payer: MEDICARE

## 2025-02-19 VITALS — BODY MASS INDEX: 35.05 KG/M2 | WEIGHT: 258.81 LBS | HEIGHT: 72 IN

## 2025-02-19 DIAGNOSIS — M19.072 ARTHRITIS OF ANKLE, LEFT: ICD-10-CM

## 2025-02-19 DIAGNOSIS — B95.8 BACTEREMIA DUE TO STAPHYLOCOCCUS: Primary | ICD-10-CM

## 2025-02-19 DIAGNOSIS — R78.81 BACTEREMIA DUE TO STAPHYLOCOCCUS: Primary | ICD-10-CM

## 2025-02-19 DIAGNOSIS — L03.116 CELLULITIS OF FOOT, LEFT: ICD-10-CM

## 2025-02-19 PROCEDURE — 99213 OFFICE O/P EST LOW 20 MIN: CPT | Mod: PBBFAC,PO | Performed by: FAMILY MEDICINE

## 2025-02-19 NOTE — PROGRESS NOTES
Subjective     Patient ID: Irvin Diaz Jr. is a 49 y.o. male.    Chief Complaint: Pain, Follow-up, and Swelling of the Left Ankle (Pt here w/ c/o L) ankle pain and swelling. Pt denies knowing when pain began. Denies recent fall/injury. States pain is a constant 8. Tramadol PRN.)    49-year-old man here today for ER referral secondary to left-sided ankle pain.  He is in the ED for this issue on February 4th.  He also presented with complaints of hematuria.  Was diagnosed with a UTI and osteoarthritis in the ankle.  Was placed on antibiotics and cultures were drawn.  He was given a referral for his ankle pain.  Two days after this visit he was contacted by the ED as his blood cultures had grown staph.  He was admitted for several days secondary to bacteremia.  MRI was done of his left lower extremity which noted a likely tenosynovitis with overlying cellulitis of his left foot.  He was discharged from his inpatient stay on February 11th.  He is scheduled to be on IV antibiotics for at least the next six weeks and has not IV pump with him today.  Notes a prior history of an over if than his medial malleolus on the left ankle.  He still has some pain mainly in the left lower extremity secondary to edema and swelling.  He also suffers from alcoholic cirrhosis as well as some renal pathology likely causing edema.  He is unable take NSAIDs secondary to these conditions.        Review of Systems   Constitutional: Negative for chills and decreased appetite.   HENT:  Negative for congestion and sore throat.    Eyes:  Negative for blurred vision.   Cardiovascular:  Negative for chest pain, dyspnea on exertion and palpitations.   Respiratory:  Negative for cough and shortness of breath.    Skin:  Negative for rash.   Neurological:  Negative for difficulty with concentration, disturbances in coordination and headaches.   Psychiatric/Behavioral:  Negative for altered mental status, depression, hallucinations, memory loss  and suicidal ideas.           Objective     General    Nursing note and vitals reviewed.  Constitutional: He is oriented to person, place, and time. He appears well-developed and well-nourished.   HENT:   Nose: Nose normal.   Eyes: EOM are normal. Pupils are equal, round, and reactive to light.   Neck: Neck supple.   Cardiovascular:  Normal rate.            Pulmonary/Chest: Effort normal.   Abdominal: Soft.   Neurological: He is alert and oriented to person, place, and time. He has normal reflexes.   Psychiatric: He has a normal mood and affect. His behavior is normal. Judgment and thought content normal.         Right Ankle/Foot Exam   Right ankle exam is normal.    Left Ankle/Foot Exam     Inspection  Bruising: Ankle - absent   Effusion: Ankle - present Foot - present  Atrophy: Ankle - absent     Pain   The patient exhibits pain of the metatarsals.    Swelling   The patient is swollen on the metatarsals, lateral malleolus and medial malleolus.    Range of Motion   The patient has normal left ankle ROM.     Muscle Strength   The patient has normal left ankle strength.    Tests   Anterior drawer: negative  Varus tilt: negative  Squeeze Test: absent    Other   Ankle Crepitus: absent  Sensation: normal  Peroneal Subluxation: negative      Reflexes     Left Side  Achilles:  2+    Vascular Exam       Left Pulses    Posterior Tibial:      2+        Edema  Left Lower Leg: present      Physical Exam  Vitals and nursing note reviewed.   Constitutional:       Appearance: He is well-developed and well-nourished.   HENT:      Nose: Nose normal.   Eyes:      Extraocular Movements: EOM normal.      Pupils: Pupils are equal, round, and reactive to light.   Cardiovascular:      Rate and Rhythm: Normal rate.      Pulses:           Posterior tibial pulses are 2+ on the left side.   Pulmonary:      Effort: Pulmonary effort is normal.   Abdominal:      Palpations: Abdomen is soft.   Musculoskeletal:      Cervical back: Neck supple.       Left lower leg: Edema present.      Left ankle: Tenderness present.   Neurological:      Mental Status: He is alert and oriented to person, place, and time.      Deep Tendon Reflexes: Reflexes are normal and symmetric.      Reflex Scores:       Achilles reflexes are 2+ on the left side.  Psychiatric:         Mood and Affect: Mood and affect normal.         Behavior: Behavior normal.         Thought Content: Thought content normal.         Judgment: Judgment normal.     EXAMINATION:  MRI ANKLE WITHOUT CONTRAST LEFT     CLINICAL HISTORY:  Septic arthritis suspected, ankle, xray done;Osteomyelitis suspected, ankle, xray done;Soft tissue infection suspected, ankle, xray done;     TECHNIQUE:  Left ankle MRI without IV contrast.     COMPARISON:  Left ankle CT 02/07/2025, left foot radiographs 01/30/2025     FINDINGS:  Susceptibility artifact related to surgical screws coursing through medial malleolus is present.     Bone marrow signal is normal.  No confluent low T1 bone marrow signal alteration to suggest osteomyelitis.  Physiologic amount of fluid lies in tibiotalar and subtalar joints.  No articular cartilage defect or osteochondral lesion.     Visualized components of the deltoid ligament complex are intact.  Lateral left ankle ligaments are intact.  Lisfranc ligament is partially visualized and intact.     Medial flexor and lateral peroneal tendons are normal.  The Achilles tendon is normal.  Trace fluid lies in retrocalcaneal bursa.     Tibialis anterior and extensor digitorum tendons are normal.  There is prominent heterogeneous increased T2 signal about the extensor hallucis longus tendon which appears intact.     Extensive subcutaneous edema affects the left foot, ankle, and distal left calf.     Impression:     1. Negative for osteomyelitis about the left ankle.  2. Extensive subcutaneous edema about the left ankle.  3. Prominent heterogeneous increased T2 signal about extensor hallucis longus tendon, with  tendon appearing intact throughout its visualized course.  The finding suggests tenosynovitis, either reactive/inflammatory in nature or, in the appropriate clinical setting, due to underlying infection.        Electronically signed by:Sin Beavers  Date:                                            02/07/2025  Time:                                           15:43        Assessment and Plan     Encounter Diagnoses   Name Primary?    Arthritis of ankle, left     Bacteremia due to Staphylococcus Yes    Cellulitis of foot, left          Irvin was seen today for pain, follow-up and swelling.    Diagnoses and all orders for this visit:    Bacteremia due to Staphylococcus    Arthritis of ankle, left  -     Ambulatory referral/consult to Orthopedics    Cellulitis of foot, left      Reviewing all the past information from his inpatient stay as well as past imaging I do not believe there is much to offer him from an orthopedic standpoint.  Most of his issue seems to be secondary to acute edema and infectious process.  He has follow up with ID in the next few weeks.  I recommend he keep all follow up in complete his course of antibiotics.  If he is still having persistent ankle pain after that he may follow up here.

## 2025-02-20 ENCOUNTER — HOSPITAL ENCOUNTER (EMERGENCY)
Facility: HOSPITAL | Age: 50
Discharge: HOME | End: 2025-02-20
Attending: EMERGENCY MEDICINE
Payer: MEDICARE

## 2025-02-20 VITALS
WEIGHT: 240 LBS | OXYGEN SATURATION: 100 % | DIASTOLIC BLOOD PRESSURE: 89 MMHG | HEART RATE: 89 BPM | SYSTOLIC BLOOD PRESSURE: 150 MMHG | RESPIRATION RATE: 16 BRPM | TEMPERATURE: 100 F | HEIGHT: 72 IN | BODY MASS INDEX: 32.51 KG/M2

## 2025-02-20 DIAGNOSIS — T14.8XXA BLISTER: Primary | ICD-10-CM

## 2025-02-20 PROCEDURE — 99282 EMERGENCY DEPT VISIT SF MDM: CPT

## 2025-02-20 NOTE — DISCHARGE INSTRUCTIONS
Please keep your upcoming scheduled followups.  Please have home health continue to monitor the blister and your PICC line dressings.  Recommend keeping a nonstick dressing over the blister region, separate from the PICC line dressing.  Please seek medical attention for any worsening symptoms or concerns including fever, redness, pain to touch.

## 2025-02-20 NOTE — ED NOTES
Masks applied to patient and self. Hands washed. Old dressing removed. PICC line dressing changed, CHG ring placed. Sterile technique used. Non adherent applied to blister with paper tape. Patient tolerated.

## 2025-02-20 NOTE — ED PROVIDER NOTES
Encounter Date: 2/20/2025       History     Chief Complaint   Patient presents with    Blister     Patient c/o blister near PICC line dressing      HPI  Pt w/ PMHX HTN, CKD3, CHF, GERD, alcoholic cirrhosis, diverticulitis, gout and prostatitis w/ recent admission 2/6-2/11 w/ MSSA bacteremia presents to ED w/ concern for wound near PICC line.  He first noticed the wound this evening.  He states home health last changed the PICC line dressing on 02/17.  The PICC line was originally placed on 02/10 per chart review.  He is on a course of IV Ancef for the next 4 weeks.  He states everything is going well with his infusions.  He denies any other complaints at present.    Review of patient's allergies indicates:   Allergen Reactions    Ciprofloxacin hcl Hallucinations    Meperidine Hives     Pt medicated w/multiple medications (demerol, protonix, and zofran)  w/i 10 mins time frame prior to developing localized hives near IV site that med was administered. Pt reports he has/takes all other medications on daily basis.     Morphine Itching    Nsaids (non-steroidal anti-inflammatory drug)      Kidney Disease    Tylenol [acetaminophen]      Hx of liver disease     Past Medical History:   Diagnosis Date    Alcohol withdrawal 5/1/2022    Alcoholic cirrhosis of liver     Alcoholic ketoacidosis 6/6/2021    Arthritis     ATN (acute tubular necrosis)     CHF (congestive heart failure)     Diverticulitis 01/2020    Esophageal varices     GERD (gastroesophageal reflux disease)     GI bleed     Hip arthritis     Left    Hypertension     Liver cirrhosis     Macrocytic anemia     Pulmonary embolism 08/2018    Unprovoked DVT.  Stop Coumadin due to GIB    Thrombocytopenia      Past Surgical History:   Procedure Laterality Date    ANKLE SURGERY      BLOCK, NERVE, PERIPHERAL Left 06/24/2022    Procedure: Left Hip femoral-obturator accessory nerve block;  Surgeon: Robbin De La Garza DO;  Location: [facility];  Service: Pain Management;   Laterality: Left;    COLON SURGERY  2007    COLONOSCOPY  09/05/2019    [facility]    COLONOSCOPY N/A 02/17/2021    Procedure: COLONOSCOPY;  Surgeon: Renea Billingsley MD;  Location: [facility];  Service: Endoscopy;  Laterality: N/A;    COLONOSCOPY N/A 02/13/2023    Procedure: COLONOSCOPY;  Surgeon: Rudy Orellana MD;  Location: [facility] (4TH FLR);  Service: Endoscopy;  Laterality: N/A;  cirrhosis-labs done on 1/11/23  instr portal-GT  No answer for precall- [place]    COLONOSCOPY N/A 03/10/2023    Procedure: COLONOSCOPY;  Surgeon: Rudy Orellana MD;  Location: [facility] (4TH FLR);  Service: Endoscopy;  Laterality: N/A;  inst via poral per pt request    COLONOSCOPY W/ BIOPSIES  02/17/2021    ECHOCARDIOGRAM,TRANSESOPHAGEAL N/A 02/10/2025    Procedure: Transesophageal echo (ALLAN) intra-procedure log documentation;  Surgeon: Yunior Abreu MD;  Location: [facility] CATH/EP LAB;  Service: Cardiology;  Laterality: N/A;    ESOPHAGOGASTRODUODENOSCOPY N/A 07/26/2019    Procedure: EGD (ESOPHAGOGASTRODUODENOSCOPY);  Surgeon: Brian Trivedi MD;  Location: [facility];  Service: Endoscopy;  Laterality: N/A;    ESOPHAGOGASTRODUODENOSCOPY N/A 04/08/2020    Procedure: EGD (ESOPHAGOGASTRODUODENOSCOPY);  Surgeon: Donn Acuna MD;  Location: [facility];  Service: Endoscopy;  Laterality: N/A;    ESOPHAGOGASTRODUODENOSCOPY N/A 01/03/2021    Procedure: EGD (ESOPHAGOGASTRODUODENOSCOPY)- coffee ground emesis, hx varices;  Surgeon: Steve Chairez MD;  Location: [facility];  Service: Endoscopy;  Laterality: N/A;    ESOPHAGOGASTRODUODENOSCOPY N/A 05/03/2021    Procedure: EGD (ESOPHAGOGASTRODUODENOSCOPY);  Surgeon: Jeanmarie Ngo MD;  Location: [facility];  Service: Endoscopy;  Laterality: N/A;    ESOPHAGOGASTRODUODENOSCOPY N/A 07/19/2021    Procedure: ESOPHAGOGASTRODUODENOSCOPY (EGD);  Surgeon: Claudio Medeiros MD;  Location: [facility];  Service: Endoscopy;  Laterality: N/A;    ESOPHAGOGASTRODUODENOSCOPY  12/20/2021    ESOPHAGOGASTRODUODENOSCOPY N/A  12/20/2021    Procedure: EGD (ESOPHAGOGASTRODUODENOSCOPY);  Surgeon: Ajay Jackson MD;  Location: [facility];  Service: Endoscopy;  Laterality: N/A;    ESOPHAGOGASTRODUODENOSCOPY N/A 05/03/2022    Procedure: EGD (ESOPHAGOGASTRODUODENOSCOPY);  Surgeon: Brian Trivedi MD;  Location: [facility];  Service: Endoscopy;  Laterality: N/A;    ESOPHAGOGASTRODUODENOSCOPY N/A 07/07/2022    Procedure: EGD (ESOPHAGOGASTRODUODENOSCOPY);  Surgeon: Ajay Jackson MD;  Location: [facility];  Service: Endoscopy;  Laterality: N/A;    ESOPHAGOGASTRODUODENOSCOPY N/A 11/29/2022    Procedure: EGD (ESOPHAGOGASTRODUODENOSCOPY);  Surgeon: Edouard Maynard MD;  Location: [facility] ([address] [place] [address]);  Service: Endoscopy;  Laterality: N/A;    ESOPHAGOGASTRODUODENOSCOPY N/A 04/28/2023    Procedure: EGD (ESOPHAGOGASTRODUODENOSCOPY);  Surgeon: Caesar Dickens MD;  Location: [facility];  Service: Endoscopy;  Laterality: N/A;    ESOPHAGOGASTRODUODENOSCOPY N/A 03/28/2024    Procedure: EGD (ESOPHAGOGASTRODUODENOSCOPY);  Surgeon: Jeanmarie Ngo MD;  Location: [facility];  Service: Endoscopy;  Laterality: N/A;    FRACTURE SURGERY  6/96    HERNIA REPAIR Left     Inguinal    INJECTION OF JOINT Right 09/28/2023    Procedure: RT Hip Injection;  Surgeon: Robbin De La Garza DO;  Location: [facility];  Service: Pain Management;  Laterality: Right;  oral - 20 mins    INJECTION OF JOINT Right 10/08/2024    Procedure: Right hip injection;  Surgeon: Robbin De La Garza DO;  Location: [facility];  Service: Pain Management;  Laterality: Right;  No sed no AC    UPPER GASTROINTESTINAL ENDOSCOPY N/A 07/07/2022     Family History   Problem Relation Name Age of Onset    Hypertension Mother pat ncgruder     Breast cancer Mother pat ncgruder     Hypertension Father Irvinradha Diaz Sr     Prostate cancer Father Irvin Joe Sr     Bladder Cancer Father Irvin Diaz Sr      Social History[1]  Review of Systems   Constitutional:   Negative for fever.   Respiratory:  Negative for shortness of breath.    Cardiovascular:  Negative for chest pain.   Gastrointestinal:  Negative for abdominal pain.   Genitourinary:  Negative for dysuria.   Skin:  Positive for wound.   All other systems reviewed and are negative.      Physical Exam     Initial Vitals   BP Pulse Resp Temp SpO2   02/20/25 0019 02/20/25 0019 02/20/25 0020 02/20/25 0019 02/20/25 0019   (!) 152/78 93 18 99.8 °F (37.7 °C) 100 %      MAP       --                Physical Exam    Nursing note and vitals reviewed.  Constitutional: He appears well-developed and well-nourished. No distress.   HENT:   Head: Normocephalic and atraumatic.   Nose: Nose normal.   Eyes: Conjunctivae and EOM are normal. Pupils are equal, round, and reactive to light.   Neck: Neck supple.   Normal range of motion.  Cardiovascular:  Normal rate and regular rhythm.           Pulmonary/Chest: Breath sounds normal. No respiratory distress.   Abdominal: Abdomen is soft. There is no abdominal tenderness. There is no rebound and no guarding.   Musculoskeletal:         General: Edema (BLE-chronic) present. No tenderness. Normal range of motion.      Cervical back: Normal range of motion and neck supple.     Neurological: He is alert and oriented to person, place, and time. He has normal strength. No cranial nerve deficit. GCS score is 15. GCS eye subscore is 4. GCS verbal subscore is 5. GCS motor subscore is 6.   Skin: Skin is warm and dry. Capillary refill takes less than 2 seconds. No rash noted.   There is a PICC line present to the right medial arm w/ dressing intact.  Scant amount of blood below the dressing.  No erythema or other drainage noted.  Nontender to touch.  There is a small clear fluid filled blister at the margin of the superior aspect of the dressing.  Also nontender and nonerythematous.   Psychiatric: He has a normal mood and affect. Thought content normal.         ED Course   Procedures  Labs Reviewed - No  data to display       Imaging Results    None          Medications - No data to display  Medical Decision Making                   Patient presents to ED as above.  Vitals are stable.  Has a small clear fluid-filled blister at the superior portion of the dressing.  I suspect potential friction blister.  Have low suspicion for allergy component as it is isolated to this area.  The current bandage was removed.  Area was cleaned and a new PICC line dressing was placed below the level of the blister.  A nonstick dressing was placed to cover the small blister.  I did discuss that it may rupture on its own and he should just continue to monitor and could apply topical bacitracin if needed.  Has home health for close wound inspections.  ED return precautions discussed and provided.                 Clinical Impression:  Final diagnoses:  [T14.8XXA] Blister (Primary)          ED Disposition Condition    Discharge Stable          ED Prescriptions    None       Follow-up Information       Follow up With Specialties Details Why Contact Info Additional Information    [facility] - Emergency Dept Emergency Medicine  As needed, If symptoms worsen [address] [facility] [id number] [phone] 1st floor               [1]   Social History  Tobacco Use    Smoking status: Former     Current packs/day: 0.00     Types: Cigarettes     Quit date:      Years since quittin.    Smokeless tobacco: Never    Tobacco comments:     quit 20 years ago   Substance Use Topics    Alcohol use: Not Currently     Comment: Quit drinking 21    Drug use: Not Currently        iHlda Leblanc MD  25 0052

## 2025-02-24 ENCOUNTER — LAB VISIT (OUTPATIENT)
Dept: LAB | Facility: HOSPITAL | Age: 50
End: 2025-02-24
Attending: INTERNAL MEDICINE
Payer: MEDICARE

## 2025-02-24 ENCOUNTER — RESULTS FOLLOW-UP (OUTPATIENT)
Dept: INFECTIOUS DISEASES | Facility: HOSPITAL | Age: 50
End: 2025-02-24

## 2025-02-24 DIAGNOSIS — N41.0 ACUTE PROSTATITIS: Primary | ICD-10-CM

## 2025-02-24 DIAGNOSIS — N41.1 CHRONIC PROSTATITIS: ICD-10-CM

## 2025-02-24 LAB
ALBUMIN SERPL BCP-MCNC: 2.2 G/DL (ref 3.5–5.2)
ALP SERPL-CCNC: 90 U/L (ref 40–150)
ALT SERPL W/O P-5'-P-CCNC: <5 U/L (ref 10–44)
ANION GAP SERPL CALC-SCNC: 6 MMOL/L (ref 8–16)
AST SERPL-CCNC: 37 U/L (ref 10–40)
BASOPHILS # BLD AUTO: 0.03 K/UL (ref 0–0.2)
BASOPHILS NFR BLD: 0.6 % (ref 0–1.9)
BILIRUB SERPL-MCNC: 2.6 MG/DL (ref 0.1–1)
BUN SERPL-MCNC: 17 MG/DL (ref 6–20)
CALCIUM SERPL-MCNC: 8 MG/DL (ref 8.7–10.5)
CHLORIDE SERPL-SCNC: 111 MMOL/L (ref 95–110)
CO2 SERPL-SCNC: 19 MMOL/L (ref 23–29)
CREAT SERPL-MCNC: 1.3 MG/DL (ref 0.5–1.4)
CRP SERPL-MCNC: 1.6 MG/L (ref 0–8.2)
DIFFERENTIAL METHOD BLD: ABNORMAL
EOSINOPHIL # BLD AUTO: 0.2 K/UL (ref 0–0.5)
EOSINOPHIL NFR BLD: 3.4 % (ref 0–8)
ERYTHROCYTE [DISTWIDTH] IN BLOOD BY AUTOMATED COUNT: 16.6 % (ref 11.5–14.5)
ERYTHROCYTE [SEDIMENTATION RATE] IN BLOOD BY WESTERGREN METHOD: 50 MM/HR (ref 0–10)
EST. GFR  (NO RACE VARIABLE): >60 ML/MIN/1.73 M^2
GLUCOSE SERPL-MCNC: 135 MG/DL (ref 70–110)
HCT VFR BLD AUTO: 26.1 % (ref 40–54)
HGB BLD-MCNC: 8.7 G/DL (ref 14–18)
IMM GRANULOCYTES # BLD AUTO: 0.02 K/UL (ref 0–0.04)
IMM GRANULOCYTES NFR BLD AUTO: 0.4 % (ref 0–0.5)
LYMPHOCYTES # BLD AUTO: 1 K/UL (ref 1–4.8)
LYMPHOCYTES NFR BLD: 18 % (ref 18–48)
MCH RBC QN AUTO: 34.3 PG (ref 27–31)
MCHC RBC AUTO-ENTMCNC: 33.3 G/DL (ref 32–36)
MCV RBC AUTO: 103 FL (ref 82–98)
MONOCYTES # BLD AUTO: 0.4 K/UL (ref 0.3–1)
MONOCYTES NFR BLD: 7.4 % (ref 4–15)
NEUTROPHILS # BLD AUTO: 3.7 K/UL (ref 1.8–7.7)
NEUTROPHILS NFR BLD: 70.2 % (ref 38–73)
NRBC BLD-RTO: 0 /100 WBC
PLATELET # BLD AUTO: 53 K/UL (ref 150–450)
PMV BLD AUTO: 10.4 FL (ref 9.2–12.9)
POTASSIUM SERPL-SCNC: 3.7 MMOL/L (ref 3.5–5.1)
PROT SERPL-MCNC: 7.1 G/DL (ref 6–8.4)
RBC # BLD AUTO: 2.54 M/UL (ref 4.6–6.2)
SODIUM SERPL-SCNC: 136 MMOL/L (ref 136–145)
WBC # BLD AUTO: 5.28 K/UL (ref 3.9–12.7)

## 2025-02-24 PROCEDURE — 86140 C-REACTIVE PROTEIN: CPT | Performed by: INTERNAL MEDICINE

## 2025-02-24 PROCEDURE — 36415 COLL VENOUS BLD VENIPUNCTURE: CPT | Performed by: INTERNAL MEDICINE

## 2025-02-24 PROCEDURE — 85025 COMPLETE CBC W/AUTO DIFF WBC: CPT | Performed by: INTERNAL MEDICINE

## 2025-02-24 PROCEDURE — 80053 COMPREHEN METABOLIC PANEL: CPT | Performed by: INTERNAL MEDICINE

## 2025-02-24 PROCEDURE — 85651 RBC SED RATE NONAUTOMATED: CPT | Performed by: INTERNAL MEDICINE

## 2025-02-25 DIAGNOSIS — N52.9 ERECTILE DYSFUNCTION, UNSPECIFIED ERECTILE DYSFUNCTION TYPE: ICD-10-CM

## 2025-02-26 RX ORDER — PAPAVERINE HYDROCHLORIDE 30 MG/ML
INJECTION INTRAMUSCULAR; INTRAVENOUS
Qty: 10 ML | Refills: 12 | Status: SHIPPED | OUTPATIENT
Start: 2025-02-26

## 2025-03-03 ENCOUNTER — LAB VISIT (OUTPATIENT)
Dept: LAB | Facility: HOSPITAL | Age: 50
End: 2025-03-03
Attending: INTERNAL MEDICINE
Payer: MEDICARE

## 2025-03-03 DIAGNOSIS — N41.0 ACUTE PROSTATITIS: Primary | ICD-10-CM

## 2025-03-03 DIAGNOSIS — N41.1 CHRONIC PROSTATITIS: ICD-10-CM

## 2025-03-03 LAB
ALBUMIN SERPL BCP-MCNC: 2.3 G/DL (ref 3.5–5.2)
ALP SERPL-CCNC: 98 U/L (ref 40–150)
ALT SERPL W/O P-5'-P-CCNC: 6 U/L (ref 10–44)
ANION GAP SERPL CALC-SCNC: 8 MMOL/L (ref 8–16)
AST SERPL-CCNC: 40 U/L (ref 10–40)
BASOPHILS # BLD AUTO: 0.03 K/UL (ref 0–0.2)
BASOPHILS NFR BLD: 0.8 % (ref 0–1.9)
BILIRUB SERPL-MCNC: 3 MG/DL (ref 0.1–1)
BUN SERPL-MCNC: 12 MG/DL (ref 6–20)
CALCIUM SERPL-MCNC: 8.2 MG/DL (ref 8.7–10.5)
CHLORIDE SERPL-SCNC: 108 MMOL/L (ref 95–110)
CO2 SERPL-SCNC: 18 MMOL/L (ref 23–29)
CREAT SERPL-MCNC: 1.3 MG/DL (ref 0.5–1.4)
CRP SERPL-MCNC: 2.2 MG/L (ref 0–8.2)
DIFFERENTIAL METHOD BLD: ABNORMAL
EOSINOPHIL # BLD AUTO: 0.1 K/UL (ref 0–0.5)
EOSINOPHIL NFR BLD: 2.5 % (ref 0–8)
ERYTHROCYTE [DISTWIDTH] IN BLOOD BY AUTOMATED COUNT: 15.8 % (ref 11.5–14.5)
ERYTHROCYTE [SEDIMENTATION RATE] IN BLOOD BY WESTERGREN METHOD: 40 MM/HR (ref 0–10)
EST. GFR  (NO RACE VARIABLE): >60 ML/MIN/1.73 M^2
GLUCOSE SERPL-MCNC: 83 MG/DL (ref 70–110)
HCT VFR BLD AUTO: 28.6 % (ref 40–54)
HGB BLD-MCNC: 9.5 G/DL (ref 14–18)
IMM GRANULOCYTES # BLD AUTO: 0.01 K/UL (ref 0–0.04)
IMM GRANULOCYTES NFR BLD AUTO: 0.3 % (ref 0–0.5)
LYMPHOCYTES # BLD AUTO: 1 K/UL (ref 1–4.8)
LYMPHOCYTES NFR BLD: 28.4 % (ref 18–48)
MCH RBC QN AUTO: 34.3 PG (ref 27–31)
MCHC RBC AUTO-ENTMCNC: 33.2 G/DL (ref 32–36)
MCV RBC AUTO: 103 FL (ref 82–98)
MONOCYTES # BLD AUTO: 0.5 K/UL (ref 0.3–1)
MONOCYTES NFR BLD: 14.6 % (ref 4–15)
NEUTROPHILS # BLD AUTO: 1.9 K/UL (ref 1.8–7.7)
NEUTROPHILS NFR BLD: 53.4 % (ref 38–73)
NRBC BLD-RTO: 0 /100 WBC
PLATELET # BLD AUTO: 61 K/UL (ref 150–450)
PMV BLD AUTO: 10.3 FL (ref 9.2–12.9)
POTASSIUM SERPL-SCNC: 3.9 MMOL/L (ref 3.5–5.1)
PROT SERPL-MCNC: 7.3 G/DL (ref 6–8.4)
RBC # BLD AUTO: 2.77 M/UL (ref 4.6–6.2)
SODIUM SERPL-SCNC: 134 MMOL/L (ref 136–145)
WBC # BLD AUTO: 3.63 K/UL (ref 3.9–12.7)

## 2025-03-03 PROCEDURE — 86140 C-REACTIVE PROTEIN: CPT | Performed by: INTERNAL MEDICINE

## 2025-03-03 PROCEDURE — 85651 RBC SED RATE NONAUTOMATED: CPT | Performed by: INTERNAL MEDICINE

## 2025-03-03 PROCEDURE — 80053 COMPREHEN METABOLIC PANEL: CPT | Performed by: INTERNAL MEDICINE

## 2025-03-03 PROCEDURE — 85025 COMPLETE CBC W/AUTO DIFF WBC: CPT | Performed by: INTERNAL MEDICINE

## 2025-03-03 PROCEDURE — 36415 COLL VENOUS BLD VENIPUNCTURE: CPT | Performed by: INTERNAL MEDICINE

## 2025-03-05 ENCOUNTER — OFFICE VISIT (OUTPATIENT)
Dept: INFECTIOUS DISEASES | Facility: CLINIC | Age: 50
End: 2025-03-05
Payer: MEDICARE

## 2025-03-05 ENCOUNTER — RESULTS FOLLOW-UP (OUTPATIENT)
Dept: INFECTIOUS DISEASES | Facility: HOSPITAL | Age: 50
End: 2025-03-05

## 2025-03-05 VITALS
DIASTOLIC BLOOD PRESSURE: 78 MMHG | TEMPERATURE: 99 F | WEIGHT: 257 LBS | HEART RATE: 66 BPM | SYSTOLIC BLOOD PRESSURE: 144 MMHG | HEIGHT: 72 IN | OXYGEN SATURATION: 97 % | BODY MASS INDEX: 34.81 KG/M2

## 2025-03-05 DIAGNOSIS — R78.81 MSSA BACTEREMIA: Primary | ICD-10-CM

## 2025-03-05 DIAGNOSIS — K70.31 ALCOHOLIC CIRRHOSIS OF LIVER WITH ASCITES: Chronic | ICD-10-CM

## 2025-03-05 DIAGNOSIS — D53.9 MACROCYTIC ANEMIA: ICD-10-CM

## 2025-03-05 DIAGNOSIS — B95.61 MSSA BACTEREMIA: Primary | ICD-10-CM

## 2025-03-05 DIAGNOSIS — R60.0 BILATERAL LEG EDEMA: ICD-10-CM

## 2025-03-05 DIAGNOSIS — N30.01 ACUTE CYSTITIS WITH HEMATURIA: ICD-10-CM

## 2025-03-05 PROCEDURE — 99214 OFFICE O/P EST MOD 30 MIN: CPT | Mod: PBBFAC,PN | Performed by: INTERNAL MEDICINE

## 2025-03-05 PROCEDURE — 99214 OFFICE O/P EST MOD 30 MIN: CPT | Mod: S$PBB,,, | Performed by: INTERNAL MEDICINE

## 2025-03-05 PROCEDURE — 99999 PR PBB SHADOW E&M-EST. PATIENT-LVL IV: CPT | Mod: PBBFAC,,, | Performed by: INTERNAL MEDICINE

## 2025-03-05 NOTE — PROGRESS NOTES
Subjective:      Reason for consult:  Hospital follow up    HPI: Irvin Diaz Jr. is a 49 y.o. male with history of alcoholic cirrhosis with portal hypertension, hypertension, GERD.  In an episode of hematuria in October 20, 2024.  Urine culture that time apparently grew MSSA.  Treated with antibiotics with resolution.  Appears he was lost to follow-up to Urology Service.     Started feeling unwell January 2025 around the snowstorm time.  Then started having hematuria again and later developed left foot swelling and pain.  Seen by his PCP on 01/29/2025.  He was diagnosed with cellulitis left foot than prescribed Keflex.  Presented to ER 1/30/25 for evaluation.  Uric acid level was high.  X-ray foot is unremarkable SR 4 screws in the medial malleolus area.  He was given steroid and discharged on allopurinol.  Staphylococcus aureus.  Back in the ER again on 02/04/2025 with left foot pain.  Stabilized and discharged once again.     Hematuria persisted. Left foot swelling also positive but did improve.  Saw Urology NP 02/06/2025.  Called back to the ER because blood culture 02/04/2025 grew.  Noncontrast CT abdomen and pelvis shows cirrhosis but no other acute findings.  Noncontrast CT left ankle and foot shows cellulitis and no abscess.  MRI left ankle and foot documented tenosynovitis but no abscess or osteomyelitis.  Blood cultures later grew MSSA.  TTE and ALLAN were negative for vegetation.  He improved on antibiotics and was discharged on IV cefazolin to complete treatment on 03/07/2025.    He has done well post discharge.  No other acute issues.  Tolerating antibiotics okay.    Review of patient's allergies indicates:   Allergen Reactions    Ciprofloxacin hcl Hallucinations    Meperidine Hives     Pt medicated w/multiple medications (demerol, protonix, and zofran)  w/i 10 mins time frame prior to developing localized hives near IV site that med was administered. Pt reports he has/takes all other medications on  daily basis.     Morphine Itching    Nsaids (non-steroidal anti-inflammatory drug)      Kidney Disease    Tylenol [acetaminophen]      Hx of liver disease     Past Medical History:   Diagnosis Date    Alcohol withdrawal 5/1/2022    Alcoholic cirrhosis of liver     Alcoholic ketoacidosis 6/6/2021    Arthritis     ATN (acute tubular necrosis)     CHF (congestive heart failure)     Diverticulitis 01/2020    Esophageal varices     GERD (gastroesophageal reflux disease)     GI bleed     Hip arthritis     Left    Hypertension     Liver cirrhosis     Macrocytic anemia     Pulmonary embolism 08/2018    Unprovoked DVT.  Stop Coumadin due to GIB    Thrombocytopenia      Past Surgical History:   Procedure Laterality Date    ANKLE SURGERY      BLOCK, NERVE, PERIPHERAL Left 06/24/2022    Procedure: Left Hip femoral-obturator accessory nerve block;  Surgeon: Robbin De La Garza DO;  Location: [facility] OR;  Service: Pain Management;  Laterality: Left;    COLON SURGERY  2007    COLONOSCOPY  09/05/2019    [facility]    COLONOSCOPY N/A 02/17/2021    Procedure: COLONOSCOPY;  Surgeon: Renea Billingsley MD;  Location: [facility];  Service: Endoscopy;  Laterality: N/A;    COLONOSCOPY N/A 02/13/2023    Procedure: COLONOSCOPY;  Surgeon: Rudy Orellana MD;  [facility]  Service: Endoscopy;  Laterality: N/A;  cirrhosis-labs done on 1/11/23  instr portal-GT  No answer for precall- [place]    COLONOSCOPY N/A 03/10/2023    Procedure: COLONOSCOPY;  Surgeon: Rudy Orellana MD;  Location: [facility] ([facility]);  Service: Endoscopy;  Laterality: N/A;  inst via poral per pt request    COLONOSCOPY W/ BIOPSIES  02/17/2021    ECHOCARDIOGRAM,TRANSESOPHAGEAL N/A 02/10/2025    Procedure: Transesophageal echo (ALLAN) intra-procedure log documentation;  Surgeon: Yunior Abreu MD;  [facility]: [facility] CATH[facility]EP LAB;  Service: Cardiology;  Laterality: N/A;    ESOPHAGOGASTRODUODENOSCOPY N/A 07/26/2019    Procedure: EGD (ESOPHAGOGASTRODUODENOSCOPY);  Surgeon: Brian PRIETO  MD Farooq;  Location: [facility];  Service: Endoscopy;  Laterality: N/A;    ESOPHAGOGASTRODUODENOSCOPY N/A 04/08/2020    Procedure: EGD (ESOPHAGOGASTRODUODENOSCOPY);  Surgeon: Donn Acuna MD;  Location: [facility];  Service: Endoscopy;  Laterality: N/A;    ESOPHAGOGASTRODUODENOSCOPY N/A 01/03/2021    Procedure: EGD (ESOPHAGOGASTRODUODENOSCOPY)- coffee ground emesis, hx varices;  Surgeon: Steve Chairez MD;  Location: [facility];  Service: Endoscopy;  Laterality: N/A;    ESOPHAGOGASTRODUODENOSCOPY N/A 05/03/2021    Procedure: EGD (ESOPHAGOGASTRODUODENOSCOPY);  Surgeon: Jeanmarie Ngo MD;  Location: [facility];  Service: Endoscopy;  Laterality: N/A;    ESOPHAGOGASTRODUODENOSCOPY N/A 07/19/2021    Procedure: ESOPHAGOGASTRODUODENOSCOPY (EGD);  Surgeon: Claudio Medeiros MD;  Location: [facility];  Service: Endoscopy;  Laterality: N/A;    ESOPHAGOGASTRODUODENOSCOPY  12/20/2021    ESOPHAGOGASTRODUODENOSCOPY N/A 12/20/2021    Procedure: EGD (ESOPHAGOGASTRODUODENOSCOPY);  Surgeon: Ajay Jackson MD;  Location: [facility];  Service: Endoscopy;  Laterality: N/A;    ESOPHAGOGASTRODUODENOSCOPY N/A 05/03/2022    Procedure: EGD (ESOPHAGOGASTRODUODENOSCOPY);  Surgeon: Brian Trivedi MD;  Location: [facility];  Service: Endoscopy;  Laterality: N/A;    ESOPHAGOGASTRODUODENOSCOPY N/A 07/07/2022    Procedure: EGD (ESOPHAGOGASTRODUODENOSCOPY);  Surgeon: Ajay Jackson MD;  Location: [facility];  Service: Endoscopy;  Laterality: N/A;    ESOPHAGOGASTRODUODENOSCOPY N/A 11/29/2022    Procedure: EGD (ESOPHAGOGASTRODUODENOSCOPY);  Surgeon: Edouard Maynard MD;  Location: [address]);  Service: Endoscopy;  Laterality: N/A;    ESOPHAGOGASTRODUODENOSCOPY N/A 04/28/2023    Procedure: EGD (ESOPHAGOGASTRODUODENOSCOPY);  Surgeon: Caesar Dickens MD;  Location: [facility];  Service: Endoscopy;  Laterality: N/A;    ESOPHAGOGASTRODUODENOSCOPY N/A 03/28/2024    Procedure: EGD (ESOPHAGOGASTRODUODENOSCOPY);  Surgeon: Huber  Jeanmarie DUMAS MD;  Location: [facility] ENDO;  Service: Endoscopy;  Laterality: N/A;    FRACTURE SURGERY      HERNIA REPAIR Left     Inguinal    INJECTION OF JOINT Right 2023    Procedure: RT Hip Injection;  Surgeon: Robbin De La Garza DO;  Location: [facility];  Service: Pain Management;  Laterality: Right;  oral - 20 mins    INJECTION OF JOINT Right 10/08/2024    Procedure: Right hip injection;  Surgeon: Robbin De La Garza DO;  Location: [facility];  Service: Pain Management;  Laterality: Right;  No sed no AC    UPPER GASTROINTESTINAL ENDOSCOPY N/A 2022      Social History     Tobacco Use    Smoking status: Former     Current packs/day: 0.00     Types: Cigarettes     Quit date:      Years since quittin.    Smokeless tobacco: Never    Tobacco comments:     quit 20 years ago   Substance Use Topics    Alcohol use: Not Currently     Comment: Quit drinking 21     Family History   Problem Relation Name Age of Onset    Hypertension Mother ya perdomo     Breast cancer Mother ya perdomo     Hypertension Father Irvin Estradaruder Sr     Prostate cancer Father Irvin Estradaruder Sr     Bladder Cancer Father Irvin Estradaruder Sr        Pertinent medications noted:     Review of Systems  ten system review unremarkable.  As in HPI    Outdoor activities:  Lives with his wife.  No pets at home.  No alcohol  Travel:  No recent travel  Implants:  None  Antibiotic History:  As above.  Currently on cefazolin      Objective:      There were no vitals taken for this visit. There is no height or weight on file to calculate BMI.  Physical Exam      General:  Middle-aged man in no acute distress   HEENT: No oral thrush   CVS: S1 and 2 heard, no murmurs appreciated  Respiratory:  Clear to auscultation   Abdomen:  Full, soft, nontender, no palpable organomegaly  Skin: No rash   CNS:  No gross focal deficits   Musculoskeletal: No joint or bony abnormalities appreciated    Wound:  None    VAD:   Right arm PICC line      General Labs reviewed:  Lab Results   Component Value Date    WBC 3.63 (L) 03/03/2025    RBC 2.77 (L) 03/03/2025    HGB 9.5 (L) 03/03/2025    HCT 28.6 (L) 03/03/2025     (H) 03/03/2025    MCH 34.3 (H) 03/03/2025    MCHC 33.2 03/03/2025    RDW 15.8 (H) 03/03/2025    PLT 61 (L) 03/03/2025    MPV 10.3 03/03/2025    GRAN 1.9 03/03/2025    GRAN 53.4 03/03/2025    LYMPH 1.0 03/03/2025    LYMPH 28.4 03/03/2025    MONO 0.5 03/03/2025    MONO 14.6 03/03/2025    EOS 0.1 03/03/2025    BASO 0.03 03/03/2025    EOSINOPHIL 2.5 03/03/2025    BASOPHIL 0.8 03/03/2025     CMP  Sodium   Date Value Ref Range Status   03/03/2025 134 (L) 136 - 145 mmol/L Final   09/02/2018 135 134 - 144 mmol/L      Potassium   Date Value Ref Range Status   03/03/2025 3.9 3.5 - 5.1 mmol/L Final     Chloride   Date Value Ref Range Status   03/03/2025 108 95 - 110 mmol/L Final   09/02/2018 102 98 - 110 mmol/L      CO2   Date Value Ref Range Status   03/03/2025 18 (L) 23 - 29 mmol/L Final     Glucose   Date Value Ref Range Status   03/03/2025 83 70 - 110 mg/dL Final   09/02/2018 99 70 - 99 mg/dL      BUN   Date Value Ref Range Status   03/03/2025 12 6 - 20 mg/dL Final     Creatinine   Date Value Ref Range Status   03/03/2025 1.3 0.5 - 1.4 mg/dL Final   09/02/2018 0.91 0.60 - 1.40 mg/dL      Calcium   Date Value Ref Range Status   03/03/2025 8.2 (L) 8.7 - 10.5 mg/dL Final     Total Protein   Date Value Ref Range Status   03/03/2025 7.3 6.0 - 8.4 g/dL Final     Albumin   Date Value Ref Range Status   03/03/2025 2.3 (L) 3.5 - 5.2 g/dL Final     Total Bilirubin   Date Value Ref Range Status   03/03/2025 3.0 (H) 0.1 - 1.0 mg/dL Final     Comment:     For infants and newborns, interpretation of results should be based  on gestational age, weight and in agreement with clinical  observations.    Premature Infant recommended reference ranges:  Up to 24 hours.............<8.0 mg/dL  Up to 48 hours............<12.0 mg/dL  3-5  days..................<15.0 mg/dL  6-29 days.................<15.0 mg/dL       Alkaline Phosphatase   Date Value Ref Range Status   03/03/2025 98 40 - 150 U/L Final     AST   Date Value Ref Range Status   03/03/2025 40 10 - 40 U/L Final     ALT   Date Value Ref Range Status   03/03/2025 6 (L) 10 - 44 U/L Final     Anion Gap   Date Value Ref Range Status   03/03/2025 8 8 - 16 mmol/L Final     eGFR   Date Value Ref Range Status   03/03/2025 >60 >60 mL/min/1.73 m^2 Final           Micro:  Microbiology Results (last 7 days)       ** No results found for the last 168 hours. **          Imaging Reviewed:          Assessment:      High-grade MSSA bacteremia in a patient with recurrent MSSA bacteriuria.  TTE and ALLAN were negative for vegetation.  Antibiotics as in #2.      2. Recurrent MSSA bacteriuria with hematuria.  CT abdomen and pelvis with contrast negative for prostate on renal abscess.  Treating as clinical prostatitis.  He is scheduled to complete antibiotic course 03/07/2025.  Informs me as about 5 days of antibiotic supplies left.  That means he will finish around 03/10/2025.     3. Left ankle and foot cellulitis.  Also with gout.  Improved on steroids.  Gout management as per hospitalist. Antibiotics as above.  Clinically resolved.     4. Compensated alcoholic cirrhosis.  Immune to HAV and HBV.  HCV screen negative on multiple locations.  Management as per PCP and GI     5. Macrocytic anemia.  Most likely related to cirrhosis.     6. CKD    7. Bilateral lower extremity edema.  Also had mild ascites on CT abdomen and pelvis 02/08/2025.  Currently on spironolactone 50 mg daily.  He probably will need increased as per linezolid and maybe addition of low-dose Lasix.  Management as per his GI and PCP.    I will plan to see again in about 4 weeks.  Please call with questions.  Spent 32 minutes in his care today.    Problem List Items Addressed This Visit    None       Plan:     As above.      There are no diagnoses  linked to this encounter.    This note was created using Dragon voice recognition software that occasionally misinterpreted phrases or words.

## 2025-03-05 NOTE — PATIENT INSTRUCTIONS
Complete the antibiotics you have at home which looks like will finish 03/10/2025   Contact your PCP and GI to assist with management of your leg swelling   I will see you again in 4 weeks

## 2025-03-19 ENCOUNTER — TELEPHONE (OUTPATIENT)
Dept: PRIMARY CARE CLINIC | Facility: CLINIC | Age: 50
End: 2025-03-19
Payer: MEDICARE

## 2025-03-19 NOTE — TELEPHONE ENCOUNTER
----- Message from [place] sent at 3/19/2025  9:58 AM CDT -----  Type:  Needs Medical AdviceWho Called: Mr Diaz Symptoms (please be specific): wrist pain  How long has patient had these symptoms:  3 days Pharmacy name and phone #:  [id number] - [place] [address] [facility] AT [facility] & [address] [address]Gctko: [phone] Fax: [phone] the patient rather a call back or a response via [facility]? Call Best Call Back Number: [phone] Information: states he is having pain in his wrist and tylenol he can't take wants to see if something else can be called in to the pharmacy

## 2025-03-20 ENCOUNTER — OFFICE VISIT (OUTPATIENT)
Dept: PRIMARY CARE CLINIC | Facility: CLINIC | Age: 50
End: 2025-03-20
Payer: MEDICARE

## 2025-03-20 ENCOUNTER — NURSE TRIAGE (OUTPATIENT)
Dept: ADMINISTRATIVE | Facility: CLINIC | Age: 50
End: 2025-03-20
Payer: MEDICARE

## 2025-03-20 ENCOUNTER — OCHSNER VIRTUAL EMERGENCY DEPARTMENT (OUTPATIENT)
Facility: CLINIC | Age: 50
End: 2025-03-20
Payer: MEDICARE

## 2025-03-20 ENCOUNTER — PATIENT OUTREACH (OUTPATIENT)
Facility: OTHER | Age: 50
End: 2025-03-20
Payer: MEDICARE

## 2025-03-20 ENCOUNTER — OFFICE VISIT (OUTPATIENT)
Dept: UROLOGY | Facility: CLINIC | Age: 50
End: 2025-03-20
Payer: MEDICARE

## 2025-03-20 VITALS
HEIGHT: 72 IN | WEIGHT: 245.81 LBS | OXYGEN SATURATION: 91 % | BODY MASS INDEX: 33.29 KG/M2 | RESPIRATION RATE: 18 BRPM | HEART RATE: 91 BPM | DIASTOLIC BLOOD PRESSURE: 68 MMHG | SYSTOLIC BLOOD PRESSURE: 128 MMHG

## 2025-03-20 VITALS
WEIGHT: 251.19 LBS | HEART RATE: 88 BPM | DIASTOLIC BLOOD PRESSURE: 89 MMHG | BODY MASS INDEX: 34.07 KG/M2 | SYSTOLIC BLOOD PRESSURE: 160 MMHG

## 2025-03-20 DIAGNOSIS — R31.0 GROSS HEMATURIA: ICD-10-CM

## 2025-03-20 DIAGNOSIS — R78.81 BACTEREMIA DUE TO COAGULASE-NEGATIVE STAPHYLOCOCCUS: ICD-10-CM

## 2025-03-20 DIAGNOSIS — M25.532 ACUTE PAIN OF LEFT WRIST: Primary | ICD-10-CM

## 2025-03-20 DIAGNOSIS — Z80.42 FAMILY HISTORY OF PROSTATE CANCER: ICD-10-CM

## 2025-03-20 DIAGNOSIS — N41.0 PROSTATITIS, ACUTE: Primary | ICD-10-CM

## 2025-03-20 DIAGNOSIS — N40.0 ENLARGED PROSTATE ON RECTAL EXAMINATION: ICD-10-CM

## 2025-03-20 DIAGNOSIS — B95.7 BACTEREMIA DUE TO COAGULASE-NEGATIVE STAPHYLOCOCCUS: ICD-10-CM

## 2025-03-20 LAB
BILIRUB SERPL-MCNC: NORMAL MG/DL
BLOOD URINE, POC: NORMAL
CLARITY, POC UA: CLEAR
COLOR, POC UA: NORMAL
GLUCOSE UR QL STRIP: NORMAL
KETONES UR QL STRIP: NORMAL
LEUKOCYTE ESTERASE URINE, POC: NORMAL
NITRITE, POC UA: NORMAL
PH, POC UA: 6.5
POC RESIDUAL URINE VOLUME: 149 ML (ref 0–100)
PROTEIN, POC: NORMAL
SPECIFIC GRAVITY, POC UA: 1.01
UROBILINOGEN, POC UA: 2

## 2025-03-20 PROCEDURE — 99999PBSHW POCT BLADDER SCAN: Mod: PBBFAC,,,

## 2025-03-20 PROCEDURE — 51798 US URINE CAPACITY MEASURE: CPT | Mod: PBBFAC,PN | Performed by: NURSE PRACTITIONER

## 2025-03-20 PROCEDURE — 99214 OFFICE O/P EST MOD 30 MIN: CPT | Mod: S$PBB,,, | Performed by: STUDENT IN AN ORGANIZED HEALTH CARE EDUCATION/TRAINING PROGRAM

## 2025-03-20 PROCEDURE — 99999 PR PBB SHADOW E&M-EST. PATIENT-LVL III: CPT | Mod: PBBFAC,,, | Performed by: NURSE PRACTITIONER

## 2025-03-20 PROCEDURE — 99999 PR PBB SHADOW E&M-EST. PATIENT-LVL V: CPT | Mod: PBBFAC,,, | Performed by: STUDENT IN AN ORGANIZED HEALTH CARE EDUCATION/TRAINING PROGRAM

## 2025-03-20 PROCEDURE — 99999PBSHW POCT URINE DIPSTICK WITHOUT MICROSCOPE: Mod: PBBFAC,,,

## 2025-03-20 PROCEDURE — 99215 OFFICE O/P EST HI 40 MIN: CPT | Mod: PBBFAC,27,PN | Performed by: STUDENT IN AN ORGANIZED HEALTH CARE EDUCATION/TRAINING PROGRAM

## 2025-03-20 PROCEDURE — 81002 URINALYSIS NONAUTO W/O SCOPE: CPT | Mod: PBBFAC,PN | Performed by: NURSE PRACTITIONER

## 2025-03-20 PROCEDURE — 87088 URINE BACTERIA CULTURE: CPT | Performed by: NURSE PRACTITIONER

## 2025-03-20 PROCEDURE — 99214 OFFICE O/P EST MOD 30 MIN: CPT | Mod: S$PBB,,, | Performed by: NURSE PRACTITIONER

## 2025-03-20 PROCEDURE — 99213 OFFICE O/P EST LOW 20 MIN: CPT | Mod: PBBFAC,PN | Performed by: NURSE PRACTITIONER

## 2025-03-20 PROCEDURE — 87077 CULTURE AEROBIC IDENTIFY: CPT | Performed by: NURSE PRACTITIONER

## 2025-03-20 PROCEDURE — 87086 URINE CULTURE/COLONY COUNT: CPT | Performed by: NURSE PRACTITIONER

## 2025-03-20 PROCEDURE — 87186 SC STD MICRODIL/AGAR DIL: CPT | Performed by: NURSE PRACTITIONER

## 2025-03-20 RX ORDER — TAMSULOSIN HYDROCHLORIDE 0.4 MG/1
0.4 CAPSULE ORAL DAILY
Qty: 30 CAPSULE | Refills: 11 | Status: SHIPPED | OUTPATIENT
Start: 2025-03-20 | End: 2025-04-19

## 2025-03-20 NOTE — PROGRESS NOTES
"Patient called the OOC RN on 3/20/25 for c/o "Lt wrist pain rated 7/10, ongoing Lt/Rt sided weakness (unchanged), and ongoing Lt hand numbness/tingling (worsening)". OOC RN consult Lisa.   Lisa Provider Dr Perez disposition recommendation patient to see primary care.  Appointment scheduled for 3/20/2025 at 11:20 AM with Edouard Gibson MD at [address] [place] [address].  Follow up scheduled for 3/21/25 to assess for additional needs.      "

## 2025-03-20 NOTE — TELEPHONE ENCOUNTER
"[place]    PCP:  Edouard Gibson MD    C/O Lt wrist pain rated 7/10, ongoing Lt/Rt sided weakness (unchanged), and ongoing Lt hand numbness/tingling (worsening).  Denies injury, fever, neck pain, rash swelling, SOB, CP, and redness.  He has tried Tramadol and a Goody for the pain without relief.  Per protocol, care advised is go to ED/UCC now (or to office with PCP approval).  Pt referred to Lisa Provider (Yimi Perez MD).  Lisa Provider recommends:  appt with PCP or if acutely worse go to UC or ER.  Appt scheduled with PCP for today at 1120.  Pt VU.  Advised to call for worsening/questions/concerns.  VU.    Reason for Disposition   Patient sounds very sick or weak to the triager   Patient sounds very sick or weak to the triager    Additional Information   Negative: Similar pain previously and it was from "heart attack"   Negative: Similar pain previously from 'angina' and not relieved by nitroglycerin   Negative: Sounds like a life-threatening emergency to the triager   Negative: Swollen joint and fever   Negative: Red area or streak and fever   Negative: SEVERE weakness (e.g., unable to walk or barely able to walk, requires support) and new-onset or getting worse   Negative: Difficult to awaken or acting confused (e.g., disoriented, slurred speech)   Negative: Sudden onset of weakness of the face, arm or leg on one side of the body and present now (Exception: Bell's palsy suspected: weakness on one side of the face developing over hours to days, with no other symptoms.)   Negative: Sudden onset of numbness of the face, arm or leg on one side of the body and present now   Negative: Sudden onset of loss of speech or garbled speech and present now   Negative: Sounds like a life-threatening emergency to the triager   Negative: Headache (with neurologic deficit)   Negative: Unable to urinate (or only a few drops) and bladder feels very full   Negative: Loss of bladder or bowel control (urine or bowel incontinence; " wetting self, leaking stool) of new-onset   Negative: Back pain with numbness (loss of sensation) in groin or rectal area   Negative: Neurologic deficit that was brief (now gone), ANY of the following: * Weakness of the face, arm, or leg on one side of the body * Numbness of the face, arm, or leg on one side of the body * Loss of speech or garbled speech    Protocols used: Wrist Pain-A-OH, Neurologic Deficit-A-OH

## 2025-03-20 NOTE — PROGRESS NOTES
Subjective:       Patient ID: Irvin Diaz Jr. is a 49 y.o. male.    Chief Complaint: Wrist Pain (Left wrist )    HPI:  49 y.o. male presents to [facility] with complaints of ongoing worsening left wrist pain.    Patient reports 3-4 days ago left wrist began hurting. Wraps with tape to help stabilize. Hasn't tried ice or heat. No trauma or lifting prior. Can't press buttons at this point with left hand. Has some numbness past 3-4 days in wrist toward elbow.    Follows with Dr. De La Garza with Pain Management. Amenable to notify Pain Management to check recommendations.    Was in Orthopaedics in past and wanted off of antibiotics for UTI before considering intervention.    Onset?: 3-4 days  Progression?: Worsening initially now more persistent, always somewhat present  Inciting incident/new physical activity?: No  Past trauma/surgery?: No  Recurrent?: No  Description?: Shooting pains and throbbing  Radiates?: No  Severity?: 7-8/10 at worst  Worsening factors?: Movements  Interventions?: Only taking what is prescribed from pain management  Did interventions help?: No      Review of Systems   Constitutional:  Negative for chills, diaphoresis, fatigue and fever.   Respiratory:  Negative for chest tightness and shortness of breath.    Cardiovascular:  Negative for chest pain and palpitations.   Gastrointestinal:  Negative for abdominal pain, diarrhea, nausea and vomiting.   Musculoskeletal:  Positive for arthralgias (Left wrist, no swelling, no trauma) and myalgias. Negative for joint swelling.   Skin:  Negative for rash and wound.   Neurological:  Positive for weakness (In left hand and wrist) and numbness (Into fingers).       Objective:      Vitals:    03/20/25 1118   BP: 128/68   BP Location: Right arm   Patient Position: Sitting   Pulse: 91   Resp: 18   SpO2: (!) 91%   Weight: 111.5 kg (245 lb 13 oz)   Height: 6' (1.829 m)     Physical Exam  Vitals reviewed.   Constitutional:       General: He is not in  acute distress.     Appearance: Normal appearance. He is not ill-appearing.   HENT:      Head: Normocephalic and atraumatic.      Mouth/Throat:      Mouth: Mucous membranes are moist.      Pharynx: Oropharynx is clear.   Eyes:      General:         Right eye: No discharge.         Left eye: No discharge.   Cardiovascular:      Rate and Rhythm: Normal rate and regular rhythm.      Pulses: Normal pulses.   Pulmonary:      Effort: Pulmonary effort is normal.   Musculoskeletal:         General: No deformity.      Right wrist: Normal.      Left wrist: Tenderness and bony tenderness present. No swelling, deformity, effusion, lacerations or crepitus. Normal range of motion. Normal pulse.      Left hand: Normal. No swelling or deformity. Normal range of motion. Normal capillary refill.        Arms:       Comments: Tenderness to left distal Ulna   Skin:     General: Skin is warm and dry.      Coloration: Skin is not jaundiced.   Neurological:      General: No focal deficit present.      Mental Status: He is alert and oriented to person, place, and time.   Psychiatric:         Mood and Affect: Mood normal.         Behavior: Behavior normal.             Lab Results   Component Value Date     (L) 03/03/2025     09/02/2018    K 3.9 03/03/2025     03/03/2025     09/02/2018    CO2 18 (L) 03/03/2025    BUN 12 03/03/2025    CREATININE 1.3 03/03/2025    CREATININE 0.91 09/02/2018    ANIONGAP 8 03/03/2025     Lab Results   Component Value Date    HGBA1C 4.8 06/12/2024     Lab Results   Component Value Date    BNP 88 02/04/2025     (H) 06/29/2024    BNP 48 04/28/2023       Lab Results   Component Value Date    WBC 3.63 (L) 03/03/2025    HGB 9.5 (L) 03/03/2025    HCT 28.6 (L) 03/03/2025    HCT 25 (L) 11/01/2022    PLT 61 (L) 03/03/2025    GRAN 1.9 03/03/2025    GRAN 53.4 03/03/2025     Lab Results   Component Value Date    CHOL 167 07/01/2024    HDL 28 (L) 07/01/2024    LDLCALC 128.0 07/01/2024    TRIG 55  07/01/2024        Current Medications[1]        Assessment:       1. Acute pain of left wrist           Plan:       Acute pain of left wrist  -     Ambulatory referral/consult to Orthopedics; Future; Expected date: 03/27/2025  -     X-Ray Wrist Complete 3 views Left; Future; Expected date: 03/20/2025  -     Sedimentation rate; Future; Expected date: 03/20/2025  -     CBC Auto Differential; Future; Expected date: 03/20/2025  -     C-reactive protein; Future; Expected date: 03/20/2025  -     Procalcitonin; Future; Expected date: 03/20/2025  - Will reach out to patient's Pain Management [place] to notify of acute concern  - If having fever, chills, sweats, pre-syncope, syncope, worsening symptoms, or other immediate concerns instructed patient to present to ED  - Appreciate Pain Management and Ortho recommendations    RTC PRN         [1]   Current Outpatient Medications:     ferrous gluconate (FERATE) 240 (27 FE) MG tablet, Take 2 tablets (480 mg total) by mouth 2 (two) times daily with meals., Disp: 120 tablet, Rfl: 3    folic acid (FOLVITE) 1 MG tablet, Take 1 tablet (1 mg total) by mouth once daily., Disp: 30 tablet, Rfl: 5    metoprolol tartrate (LOPRESSOR) 50 MG tablet, Take 1 tablet (50 mg total) by mouth 2 (two) times daily. Do not take if heart rate is less than 60, Disp: 60 tablet, Rfl: 11    nortriptyline (PAMELOR) 25 MG capsule, Take 1 capsule (25 mg total) by mouth every evening., Disp: 90 capsule, Rfl: 3    papaverine 30 mg/mL injection, Add phentolamine 10 mgs/ml., add PGE1  100 micrograms., Disp: 10 mL, Rfl: 12    rifAXIMin (XIFAXAN) 550 mg Tab, Take 1 tablet (550 mg total) by mouth 2 (two) times daily., Disp: 60 tablet, Rfl: 11    spironolactone (ALDACTONE) 25 MG tablet, Take 1 tablet (25 mg total) by mouth once daily., Disp: 90 tablet, Rfl: 3    tadalafiL (CIALIS) 20 MG Tab, Take 1 tablet (20 mg total) by mouth daily as needed (erectile dysfunction)., Disp: 15 tablet, Rfl: 11    tamsulosin (FLOMAX)  0.4 mg Cap, Take 1 capsule (0.4 mg total) by mouth once daily., Disp: 30 capsule, Rfl: 11    thiamine 100 MG tablet, Take 1 tablet (100 mg total) by mouth once daily., Disp: 30 tablet, Rfl: 5    traMADoL (ULTRAM) 50 mg tablet, Take 1 tablet (50 mg total) by mouth 4 (four) times daily as needed for Pain., Disp: 120 each, Rfl: 2

## 2025-03-20 NOTE — PLAN OF CARE-OVED
[facility] Plan of Care Note  Referral Source: Nurse On-Call                               Chief Complaint   Patient presents with    Wrist Pain       Recommendation: Primary Care                            No diagnosis found.      Pmhx of alcohol cirrhosis pw left wrist pain for 3 days. Has arthritis similar. Appointment made for PCP at 11.20 AM today.

## 2025-03-20 NOTE — PROGRESS NOTES
Pt w/ PMHX HTN, CKD3, CHF, GERD, alcoholic cirrhosis, diverticulitis, gout and prostatitis w/ recent admission 2/6-2/11 w/ MSSA bacteremia presents to ED w/ concern for wound near PICC line.  He first noticed the wound this evening.  He states home health last changed the PICC line dressing on 02/17.  The PICC line was originally placed on 02/10 per chart review.  He is on a course of IV Ancef for the next 4 weeks.  He states everything is going well with his infusions.  He denies any other complaints at present.    11/19/2020 3/27/2024 10/22/2024   Urine Culture No significant growth  STAPHYLOCOCCUS AUREUS !  STAPHYLOCOCCUS AUREUS !       1/29/2025 2/4/2025 2/7/2025   Urine Culture STAPHYLOCOCCUS AUREUS !  STAPHYLOCOCCUS AUREUS !  No growth      Lab Results   Component Value Date    PSA 1.9 10/22/2024    PSA 1.6 09/05/2024    PSA 1.2 07/21/2022    PSA 1.2 10/27/2020     Patient ID: Irvin Diaz Jr. is a 49 y.o. male.    Chief Complaint: Follow-up    History of Present Illness    CHIEF COMPLAINT:  Patient presents today for follow up of recurrent urinary tract infections.    HISTORY OF PRESENT ILLNESS:  He was previously seen on February 6th and received Rocephin injection with Bactrim prescription for UTI. He did not take prescribed medications due to emergency room admission. Following ER visit, he received a PICC line with antibiotic therapy and was evaluated by infectious disease specialist. Urine culture from February 7th was negative. He denies any current issues with urination or constipation.    FAMILY HISTORY:  His father was diagnosed with prostate cancer age unknow.    MEDICATIONS:  He denies taking Farxiga and denies being on any blood thinners.      ROS:  General: -fever, -chills, -fatigue, -weight gain, -weight loss  Eyes: -vision changes, -redness, -discharge  ENT: -ear pain, -nasal congestion, -sore throat  Cardiovascular: -chest pain, -palpitations, -lower extremity edema  Respiratory:  -cough, -shortness of breath  Gastrointestinal: -abdominal pain, -nausea, -vomiting, -diarrhea, -constipation, -blood in stool  Genitourinary: -dysuria, -hematuria, -frequency  Musculoskeletal: -joint pain, -muscle pain  Skin: -rash, -lesion  Neurological: -headache, -dizziness, -numbness, -tingling  Psychiatric: -anxiety, -depression, -sleep difficulty         Physical Exam    General: No acute distress. Well-developed. Well-nourished. Uses cane for assistance     Respiratory: Normal respiratory effort.   Abdomen: Soft. Non-tender. Non-distended.   Psychiatric: Normal mood. Normal affect. Good insight. Good judgment.  Skin: Warm. Dry. No rash.  Male Genitourinary: Enlarged prostate. No tumors or masses on prostate., difficulty reaching base         Assessment & Plan        IMPRESSION:  - Suspect enlarged prostate causing recurrent UTIs, based on bladder scan showing 150 cc residual urine.  - Considering minimally invasive procedure to clip open prostate, depending on ultrasound results and cystoscopy findings.  - Performed prostate exam: enlarged but no suspicious masses or tumors detected.    ENLARGED PROSTATE WITH LOWER URINARY TRACT SYMPTOMS:  - Explained that enlarged prostate likely causing incomplete bladder emptying, leading to recurrent UTIs.  - Discussed potential need for prostate procedure based on diagnostic results.  - Started Tamsulosin (Flomax) daily to improve bladder emptying.  - Ordered prostate US and cystoscopy.    URINARY TRACT INFECTION:  - Perform quick urine check before cystoscopy to ensure no active infection.  - Ordered urine culture.    FOLLOW-UP:  - Follow up for cystoscopy.           This note was generated with the assistance of ambient listening technology. Verbal consent was obtained by the patient and accompanying visitor(s) for the recording of patient appointment to facilitate this note. I attest to having reviewed and edited the generated note for accuracy, though some syntax or  spelling errors may persist. Please contact the author of this note for any clarification.

## 2025-03-21 ENCOUNTER — PATIENT OUTREACH (OUTPATIENT)
Facility: OTHER | Age: 50
End: 2025-03-21
Payer: MEDICARE

## 2025-03-21 LAB — BACTERIA UR CULT: ABNORMAL

## 2025-03-21 NOTE — PROGRESS NOTES
Per chart review, patient attended primary care appointment on 3/20/25. Called patient to follow up and assess additional needs. Patient states that he has no additional concerns at this time.

## 2025-03-24 ENCOUNTER — HOSPITAL ENCOUNTER (OUTPATIENT)
Dept: RADIOLOGY | Facility: HOSPITAL | Age: 50
Discharge: HOME | End: 2025-03-24
Attending: STUDENT IN AN ORGANIZED HEALTH CARE EDUCATION/TRAINING PROGRAM
Payer: MEDICARE

## 2025-03-24 ENCOUNTER — PATIENT MESSAGE (OUTPATIENT)
Dept: PAIN MEDICINE | Facility: CLINIC | Age: 50
End: 2025-03-24

## 2025-03-24 ENCOUNTER — OFFICE VISIT (OUTPATIENT)
Dept: PAIN MEDICINE | Facility: CLINIC | Age: 50
End: 2025-03-24
Payer: MEDICARE

## 2025-03-24 ENCOUNTER — OFFICE VISIT (OUTPATIENT)
Dept: HEPATOLOGY | Facility: CLINIC | Age: 50
End: 2025-03-24
Payer: MEDICARE

## 2025-03-24 ENCOUNTER — RESULTS FOLLOW-UP (OUTPATIENT)
Dept: PRIMARY CARE CLINIC | Facility: CLINIC | Age: 50
End: 2025-03-24

## 2025-03-24 ENCOUNTER — TELEPHONE (OUTPATIENT)
Dept: HEPATOLOGY | Facility: CLINIC | Age: 50
End: 2025-03-24

## 2025-03-24 VITALS
WEIGHT: 245.81 LBS | SYSTOLIC BLOOD PRESSURE: 125 MMHG | DIASTOLIC BLOOD PRESSURE: 78 MMHG | HEART RATE: 80 BPM | BODY MASS INDEX: 33.29 KG/M2 | HEIGHT: 72 IN

## 2025-03-24 DIAGNOSIS — M87.00 AVN (AVASCULAR NECROSIS OF BONE): Chronic | ICD-10-CM

## 2025-03-24 DIAGNOSIS — M25.532 LEFT WRIST PAIN: Primary | ICD-10-CM

## 2025-03-24 DIAGNOSIS — I85.10 SECONDARY ESOPHAGEAL VARICES WITHOUT BLEEDING: ICD-10-CM

## 2025-03-24 DIAGNOSIS — F10.20 ALCOHOL USE DISORDER, SEVERE, DEPENDENCE: Chronic | ICD-10-CM

## 2025-03-24 DIAGNOSIS — K76.6 PORTAL HYPERTENSION: ICD-10-CM

## 2025-03-24 DIAGNOSIS — K70.31 ALCOHOLIC CIRRHOSIS OF LIVER WITH ASCITES: Primary | Chronic | ICD-10-CM

## 2025-03-24 DIAGNOSIS — M25.532 ACUTE PAIN OF LEFT WRIST: ICD-10-CM

## 2025-03-24 PROCEDURE — G2211 COMPLEX E/M VISIT ADD ON: HCPCS | Mod: 95,,, | Performed by: INTERNAL MEDICINE

## 2025-03-24 PROCEDURE — 99213 OFFICE O/P EST LOW 20 MIN: CPT | Mod: PBBFAC,25 | Performed by: STUDENT IN AN ORGANIZED HEALTH CARE EDUCATION/TRAINING PROGRAM

## 2025-03-24 PROCEDURE — 99214 OFFICE O/P EST MOD 30 MIN: CPT | Mod: S$PBB,,, | Performed by: STUDENT IN AN ORGANIZED HEALTH CARE EDUCATION/TRAINING PROGRAM

## 2025-03-24 PROCEDURE — 73110 X-RAY EXAM OF WRIST: CPT | Mod: TC,LT

## 2025-03-24 PROCEDURE — 73110 X-RAY EXAM OF WRIST: CPT | Mod: 26,LT,, | Performed by: RADIOLOGY

## 2025-03-24 PROCEDURE — 99999 PR PBB SHADOW E&M-EST. PATIENT-LVL III: CPT | Mod: PBBFAC,,, | Performed by: STUDENT IN AN ORGANIZED HEALTH CARE EDUCATION/TRAINING PROGRAM

## 2025-03-24 PROCEDURE — 98006 SYNCH AUDIO-VIDEO EST MOD 30: CPT | Mod: 95,,, | Performed by: INTERNAL MEDICINE

## 2025-03-24 NOTE — TELEPHONE ENCOUNTER
----- Message from Jacek Arreaga MD sent at 3/24/2025 10:20 AM CDT -----  Clinic in 3 months with labs and US

## 2025-03-24 NOTE — PROGRESS NOTES
Subjective:       Patient ID: Irvin Diaz Jr. is a 49 y.o. male.    Chief Complaint: No chief complaint on file.  The patient location is: Home  The chief complaint leading to consultation is: Cirrhosis    Visit type: audiovisual    Face to Face time with patient: 20 minutes of total time spent on the encounter, which includes face to face time and non-face to face time preparing to see the patient (eg, review of tests), Obtaining and/or reviewing separately obtained history, Documenting clinical information in the electronic or other health record, Independently interpreting results (not separately reported) and communicating results to the patient/family/caregiver, or Care coordination (not separately reported).     Each patient to whom he or she provides medical services by telemedicine is:  (1) informed of the relationship between the physician and patient and the respective role of any other health care provider with respect to management of the patient; and (2) notified that he or she may decline to receive medical services by telemedicine and may withdraw from such care at any time.    Notes:       HPI  I saw this 49 y.o. man with decompensated cirrhosis related to alcohol.  He came to clinic alone.    I last saw him in the liver clinic in Aug 2024..    He has had some minor variceal bleeds - been to [facility] in [id number].  - previously banded  - more recently he's had some leg edema and was started on diuretics.    In addition, he was in hospital in Feb/March 2025 with sepsis related to his urinary tract.    Stopped drinking in April 2024  - last PEth on 8/12/24 was negative    EGD: 3/28/24  Small (< 5 mm) varices were found in the lower third of the        esophagus.        A small post variceal banding scar was found in the lower third of        the esophagus.        The stomach was normal.        The examined duodenum was normal     No episodes of HE.    CT abdo: 2/8/25  1. Cirrhosis with  findings of portal hypertension, including portosystemic collateral formation, mild splenomegaly, and mild perihepatic ascites.  2. Stable bilateral perinephric fat stranding.  No evidence of renal mass, calculus, or hydronephrosis.   Deformity of the proximal left femur and severe left hip osteoarthritic changes are noted.  Changes of avascular necrosis and osteo arthritis at the right hip are stable      Alcohol hx:  - 1/5 of gin per day  - completed outpatient rehab at [facility] and managed to stay off alcohol for 5 months but relapsed a couple of times      No DUI  No legal troubles with alcohol    [facility]  No alcohol for 10 months- last alcohol in April 2024    MELD 3.0: 20 at 2/11/2025  5:02 AM  MELD-Na: 19 at 2/11/2025  5:02 AM  Calculated from:  Serum Creatinine: 2 mg/dL at 2/11/2025  5:02 AM  Serum Sodium: 137 mmol/L at 2/11/2025  5:02 AM  Total Bilirubin: 2 mg/dL at 2/11/2025  5:02 AM  Serum Albumin: 2.4 g/dL at 2/11/2025  5:02 AM  INR(ratio): 1.3 at 2/9/2025  4:10 PM  Age at listing (hypothetical): 49 years  Sex: Male at 2/11/2025  5:02 AM      PMH:  Colon resection- 2007- mass- not Ca  Diverticulitis  CKD  Hypertension  Hip OA- left hip  PE- 2018- stopped warfarin due to GI bleed      SH:  Disabled  Ex-smoker (1 pack robert day for 3 years in his 20s)  Lives with mother  6 kids- healthy    FH:  Nil liver      Review of Systems   Constitutional:  Negative for activity change, appetite change, chills, fatigue, fever and unexpected weight change.   HENT:  Negative for hearing loss.    Eyes:  Negative for discharge and visual disturbance.   Respiratory:  Negative for cough, chest tightness, shortness of breath and wheezing.    Cardiovascular:  Negative for chest pain, palpitations and leg swelling.   Gastrointestinal:  Negative for abdominal distention, abdominal pain, constipation, diarrhea and nausea.   Genitourinary:  Negative for dysuria and frequency.   Musculoskeletal:   Negative for arthralgias and back pain.   Skin:  Negative for pallor and rash.   Neurological:  Negative for dizziness, tremors, speech difficulty and headaches.   Hematological:  Negative for adenopathy.   Psychiatric/Behavioral:  Negative for agitation and confusion.          Lab Results   Component Value Date    ALT 6 (L) 03/03/2025    AST 40 03/03/2025    GGT 42 09/05/2024    ALKPHOS 98 03/03/2025    BILITOT 3.0 (H) 03/03/2025     Past Medical History:   Diagnosis Date    Alcohol withdrawal 5/1/2022    Alcoholic cirrhosis of liver     Alcoholic ketoacidosis 6/6/2021    Arthritis     ATN (acute tubular necrosis)     CHF (congestive heart failure)     Diverticulitis 01/2020    Esophageal varices     GERD (gastroesophageal reflux disease)     GI bleed     Hip arthritis     Left    Hypertension     Liver cirrhosis     Macrocytic anemia     Pulmonary embolism 08/2018    Unprovoked DVT.  Stop Coumadin due to GIB    Thrombocytopenia      Past Surgical History:   Procedure Laterality Date    ANKLE SURGERY      BLOCK, NERVE, PERIPHERAL Left 06/24/2022    Procedure: Left Hip femoral-obturator accessory nerve block;  Surgeon: Robbin De La Garza DO;  Location: [facility];  Service: Pain Management;  Laterality: Left;    COLON SURGERY  2007    COLONOSCOPY  09/05/2019    [facility]    COLONOSCOPY N/A 02/17/2021    Procedure: COLONOSCOPY;  Surgeon: Renea Billingsley MD;  Location: [facility];  Service: Endoscopy;  Laterality: N/A;    COLONOSCOPY N/A 02/13/2023    Procedure: COLONOSCOPY;  Surgeon: Rudy Orellana MD;  Location: [address]);  Service: Endoscopy;  Laterality: N/A;  cirrhosis-labs done on 1/11/23  instr portal-GT  No answer for precall- [place]    COLONOSCOPY N/A 03/10/2023    Procedure: COLONOSCOPY;  Surgeon: Rudy Orellana MD;  Location: [facility] ([address] [place] [address]);  Service: Endoscopy;  Laterality: N/A;  inst via poral per pt request    COLONOSCOPY W/ BIOPSIES  02/17/2021    ECHOCARDIOGRAM,TRANSESOPHAGEAL N/A 02/10/2025     Procedure: Transesophageal echo (ALLAN) intra-procedure log documentation;  Surgeon: Yunior Abreu MD;  Location: [facility] CATH/EP LAB;  Service: Cardiology;  Laterality: N/A;    ESOPHAGOGASTRODUODENOSCOPY N/A 07/26/2019    Procedure: EGD (ESOPHAGOGASTRODUODENOSCOPY);  Surgeon: Brian Trivedi MD;  Location: [facility];  Service: Endoscopy;  Laterality: N/A;    ESOPHAGOGASTRODUODENOSCOPY N/A 04/08/2020    Procedure: EGD (ESOPHAGOGASTRODUODENOSCOPY);  Surgeon: Donn Acuna MD;  Location: [facility];  Service: Endoscopy;  Laterality: N/A;    ESOPHAGOGASTRODUODENOSCOPY N/A 01/03/2021    Procedure: EGD (ESOPHAGOGASTRODUODENOSCOPY)- coffee ground emesis, hx varices;  Surgeon: Steve Chairez MD;  Location: [facility];  Service: Endoscopy;  Laterality: N/A;    ESOPHAGOGASTRODUODENOSCOPY N/A 05/03/2021    Procedure: EGD (ESOPHAGOGASTRODUODENOSCOPY);  Surgeon: Jeanmarie Ngo MD;  Location: [facility];  Service: Endoscopy;  Laterality: N/A;    ESOPHAGOGASTRODUODENOSCOPY N/A 07/19/2021    Procedure: ESOPHAGOGASTRODUODENOSCOPY (EGD);  Surgeon: Claudio Medeiros MD;  Location: [facility];  Service: Endoscopy;  Laterality: N/A;    ESOPHAGOGASTRODUODENOSCOPY  12/20/2021    ESOPHAGOGASTRODUODENOSCOPY N/A 12/20/2021    Procedure: EGD (ESOPHAGOGASTRODUODENOSCOPY);  Surgeon: Ajay Jackson MD;  Location: [facility];  Service: Endoscopy;  Laterality: N/A;    ESOPHAGOGASTRODUODENOSCOPY N/A 05/03/2022    Procedure: EGD (ESOPHAGOGASTRODUODENOSCOPY);  Surgeon: Brian Trivedi MD;  Location: [facility];  Service: Endoscopy;  Laterality: N/A;    ESOPHAGOGASTRODUODENOSCOPY N/A 07/07/2022    Procedure: EGD (ESOPHAGOGASTRODUODENOSCOPY);  Surgeon: Ajay Jackson MD;  Location: [facility];  Service: Endoscopy;  Laterality: N/A;    ESOPHAGOGASTRODUODENOSCOPY N/A 11/29/2022    Procedure: EGD (ESOPHAGOGASTRODUODENOSCOPY);  Surgeon: Edouard Maynard MD;  Location: [facility] ([address] [place] [address]);  Service: Endoscopy;  Laterality: N/A;     ESOPHAGOGASTRODUODENOSCOPY N/A 04/28/2023    Procedure: EGD (ESOPHAGOGASTRODUODENOSCOPY);  Surgeon: Caesar Dickens MD;  Location: [facility];  Service: Endoscopy;  Laterality: N/A;    ESOPHAGOGASTRODUODENOSCOPY N/A 03/28/2024    Procedure: EGD (ESOPHAGOGASTRODUODENOSCOPY);  Surgeon: Jeanmarie Ngo MD;  Location: [facility] END[facility];  Service: Endoscopy;  Laterality: N/A;    FRACTURE SURGERY  6/96    HERNIA REPAIR Left     Inguinal    INJECTION OF JOINT Right 09/28/2023    Procedure: RT Hip Injection;  Surgeon: Robbin De La Garza DO;  Location: [facility];  Service: Pain Management;  Laterality: Right;  oral - 20 mins    INJECTION OF JOINT Right 10/08/2024    Procedure: Right hip injection;  Surgeon: Robbin De La Garza DO;  Location: [facility];  Service: Pain Management;  Laterality: Right;  No sed no AC    UPPER GASTROINTESTINAL ENDOSCOPY N/A 07/07/2022     Current Outpatient Medications   Medication Sig    ferrous gluconate (FERATE) 240 (27 FE) MG tablet Take 2 tablets (480 mg total) by mouth 2 (two) times daily with meals.    folic acid (FOLVITE) 1 MG tablet Take 1 tablet (1 mg total) by mouth once daily.    metoprolol tartrate (LOPRESSOR) 50 MG tablet Take 1 tablet (50 mg total) by mouth 2 (two) times daily. Do not take if heart rate is less than 60    nortriptyline (PAMELOR) 25 MG capsule Take 1 capsule (25 mg total) by mouth every evening.    papaverine 30 mg/mL injection Add phentolamine 10 mgs/ml., add PGE1  100 micrograms.    rifAXIMin (XIFAXAN) 550 mg Tab Take 1 tablet (550 mg total) by mouth 2 (two) times daily.    spironolactone (ALDACTONE) 25 MG tablet Take 1 tablet (25 mg total) by mouth once daily.    tadalafiL (CIALIS) 20 MG Tab Take 1 tablet (20 mg total) by mouth daily as needed (erectile dysfunction).    tamsulosin (FLOMAX) 0.4 mg Cap Take 1 capsule (0.4 mg total) by mouth once daily.    thiamine 100 MG tablet Take 1 tablet (100 mg total) by mouth once daily.    traMADoL  (ULTRAM) 50 mg tablet Take 1 tablet (50 mg total) by mouth 4 (four) times daily as needed for Pain. (Patient not taking: Reported on 3/24/2025)     No current facility-administered medications for this visit.       Objective:      NOT DONE-VIDEO VISIT  Assessment:       1. Alcoholic cirrhosis of liver with ascites    2. Secondary esophageal varices without bleeding    3. Alcohol use disorder, severe, dependence    4. AVN (avascular necrosis of bone)    5. Portal hypertension        Plan:   This 49 year old man has decompensated alcohol related cirrhosis with jaundice and mild ascites. His MELD score despite abstinence from alcohol was 20 in Feb 2025 driven by his bilirubin and his chronically elevated creatinine.  However, his latest creatinine is down to 1.3 and he is only on low dose spironolactone and feels well.    Once again, I explained to him that his liver function was severely impaired but can recover with prolonged abstinence. Explained that he should aim for lifelong complete abstinence.    - Left hip fracture 4 years ago with poor healing and dificulty walking  - Walker around the house  - wheelchair for distances    I had previously wanted him to start a liver Tx evaluation but since he has improved, we will defer this until I see him back in clinic in 3 months.    - labs/US in 3 months.    I spent a total of 30 minutes on the day of the visit.  This includes face to face time and non-face to face time preparing to see the patient (eg, review of tests), obtaining and/or reviewing separately obtained history, documenting clinical information in the electronic or other health record, independently interpreting results and communicating results to the patient/family/caregiver, or care coordinator.

## 2025-03-24 NOTE — PROGRESS NOTES
Chronic Pain - f/u    Referring Physician: No ref. provider found    Date: 03/24/2025     Re: Irvin Diaz Jr.  MR#: [id number]  YOB: 1975  Age: 49 y.o.    Chief Complaint: hip pain  Chief Complaint   Patient presents with    Follow-up     **This note is dictated using the M*Modal Fluency Direct word recognition program. There are word recognition mistakes that are occasionally missed on review.**    ASSESSMENT: 49 y.o. year old male with left hip pain, consistent with     1. Left wrist pain  WRIST BRACE FOR HOME USE    Ambulatory referral/consult to Orthopedics        PLAN:     Acute left wrist pain  -Rx for wrist brace  -Referral to ortho hand  -recommended he compelte the Xr that was ordered by PCP    Left knee pain likely OA 2/2 altered mechanics due to hip AVN  -pain with varus/valgus strain  -left knee x-ray shows KL grade 2 OA with medial joint space narrowing  -minimal edema. Pain worse with weight bearing.  -6/20/24 - knee injection CSI - 70%x1m  -He has failed PT in the knee before.  -He uses a knee brace.  -failed PT. Failed conservative therapy.  Cannot take NSAIDs due to low platelets  -MEDICAL NECESSITY FOR VISCOSUPPLEMENTATION USE: After thorough evaluation of the patient, I have determined that viscosupplementation treatment is medically necessary. The patient has painful degenerative joint disease (DJD) of the knee(s) with failure of conservative treatments including lifestyle modifications and rehabilitation exercises. Oral analgesics including NSAIDs have not adequately controlled the patient's symptoms or are contraindicated. There is radiographic evidence of Kellgren-Maury grade II (or greater) osteoarthritic (OA) changes, or if lack of radiographic evidence, there is arthroscopic or other evidence of chondrosis of the knee(s).   10/10/24 - L knee synvisc-one inj. Helpful until fall the week before [place] - >50% until then  4/10/25@1pm- L syncisc-one    Avascular necrosis  of the left hip   -s/p hip block without significant pain relief and complicated by Hematoma.   -tolerating oxycodone 5 mg BID PRN (he thinks his lucid dreams were due to EtOH withdrawal). No more lucid dreams. Switch back to tramadol  -switched back to tramadol because he has been doing better and does not think that he needs the oxycodone anymore.  -STOPPED Tramadol QID. This was working for him. Helps him function. He has since stopped all opioids.  - Medrol dose pack, helped while taking, but not sustained.  -not surgical candidate  -Not a candidate for further procedures due to bleeding risk  - Plts improved to ~110.  -buprenorphine likely not an option 2/2 to his ESLD and liver transplant status    Right hip pain  -XR Right hip given hx of avascular necrosis in the left hip. This showed moderate OA in the hip  9/28/23 - right hip injection - >50% relief for 6 months  10/8/24 - Right hip injection - no sed - no AC - 50% @ 3m. Recommended waiting as long as possible before a repeat.  -recommended minimizing repeats due to risks unless the pain becomes unbearable.    Allodynia  -stopped Nortriptyline to 50 mg nightly. Helps him sleep, but made him nauseous    Thorombocytopenia 2/2 cirrhosis  -platelets are 92 on 5/26/22, 82 on 9/7/22, 74 on 10/25/22, 110 on 5/18/23  -Avanos recommended above 50, but he had hematoma, so will not proceed.    Chronic Opioid Use  - UDS from October and September did not show opioids but he states he was decreasing use at that time.  -drug screen 05/2023 which was appropriate.  -UDS 6/2024 showed MJ.  UDS in 09/2024 was appropriate.    Cirrhosis 2/2 past EtOH. He states he is not drinking anymore.  -following with hepatology  -possible liver transplant?  -working with psychiatry for EtOH.    Kidney disease  -GFR 54    - RTC 4/10/25 for synvisc  - Counseled patient regarding the importance of  activity modification.    The above plan and management options were discussed at length with  patient. Patient is in agreement with the above and verbalized understanding. It will be communicated with the referring physician via electronic record, fax, or mail.  Lab/study reports reviewed were important and necessary because subsequent medical and treatment recommendations required review of the above lab/study reports. Images viewed/reviewed above were important and necessary because subsequent medical and treatment recommendations required review of the reviewed image(s).     Electronically signed by:  Robbin De La Garza DO  03/24/2025    =========================================================================================================    SUBJECTIVE:    Interval History 3/24/2025:   Irvin WOOD Joe Gudino is a 49 y.o. male presents to the clinic for follow up.  Since last visit the pain has is unchanged. The patient was recently admitted to the hospital for blood in his urine.  He had bacteremia 2/2 staph.  He was placed on IVB antibiotics with a picc line. The picc linew as removed on 2/27.  The wrist started bothering him last Wednesday/Thursday. Denies trauma.  (+) swelling.      The pain is located in the left wrist, left hip area and radiates to the left knee and ankle .  The pain is described as aching, shooting, and stabbing    At BEST  8/10   At WORST  10/10 on the WORST day.    On average pain is rated as 8/10.   Today the pain is rated as 8/10  Symptoms interfere with daily activity and sleeping.   Exacerbating factors: Sitting, Standing, Bending, and Flexing.    Mitigating factors medications.     Current pain medications: nothing  Failed Pain Medications: oxycodone, tramadol, gabapentin, cannot take NSAIDs due to gastric bleeding, cannot take tylenol due to liver failure. Tramadol 50mg QID    Pain procedures:  6/24/22 - left hip block - no relief. C/b hematoma formation  10/10/23 - Right hip injection - oral sed - no AC - >50% @ 6m  6/20/24 - L Knee - 70%x1m  10/8/24 - Right hip  injection - no sed - no AC - >50%@3m  10/10/24 - L knee synvisc-1 inj. - >50% x2.5m    Interval History 1/2/2025:   Irvin Diaz Jr. is a 49 y.o. male presents to the clinic for follow up.  Since last visit the pain has is unchanged. Hip pain and knee pain much better after injection until he had a fall just before [place].  Right hip is still doing okay but the left knee has been acting up more.      The pain is located in the left hip area and radiates to the left knee and ankle .  The pain is described as aching, shooting, and stabbing    At BEST  8/10   At WORST  10/10 on the WORST day.    On average pain is rated as 8/10.   Today the pain is rated as 9/10  Symptoms interfere with daily activity and sleeping.   Exacerbating factors: Sitting, Standing, Bending, and Flexing.    Mitigating factors medications.     Interval History 9/26/2024:     Irvin Diaz Jr. is a 49 y.o. male presents to the clinic for follow up.  Since last visit the pain has is unchanged.    The pain is located in the left hip area and radiates to the left knee and ankles .  The pain is described as aching, shooting, and stabbing    At BEST  8/10   At WORST  10/10 on the WORST day.    On average pain is rated as 8/10.   Today the pain is rated as 8/10  Symptoms interfere with daily activity and sleeping.   Exacerbating factors: Sitting, Standing, Bending, and Flexing.    Mitigating factors nothing.     Interval History 6/20/2024:    is a 48 y.o. male presents to the clinic for follow up.  Since last visit the pain has is unchanged.    The pain is located in the left Hip area and radiates to the left knee and ankles.  The pain is described as aching, shooting, and stabbing    At BEST  8/10   At WORST  10/10 on the WORST day.    On average pain is rated as 8/10.   Today the pain is rated as 8/10  Symptoms interfere with daily activity and sleeping.   Exacerbating factors: Sitting, Standing, Bending, and Flexing.    Mitigating  factors nothing.     Interval History 3/18/2024:   Irvin Diaz Jr. is a 49 y.o. male presents to the clinic for follow up.  Since last visit the pain has has worsened. He has been having to travel to [place] a lot recently due to his son-in-law getting in to a MVA and helping out. His right hip has been bothering him more recently    The pain is located in the groin area and radiates to the right hip, left knee and ankles .  The pain is described as aching    At BEST  8/10   At WORST  10/10 on the WORST day.    On average pain is rated as 8/10.   Today the pain is rated as 8/10  Symptoms interfere with daily activity and sleeping.   Exacerbating factors: Sitting, Standing, Bending, and Flexing.    Mitigating factors medications.     Interval History 9/13/2023:   Irvin Diaz Jr. is a 49 y.o. male presents to the clinic for follow up.  Since last visit the pain has has worsened.    The pain is located in the groin, left knee, and both ankles area and radiates to the knee and ankle .  The pain is described as aching    At BEST  8/10   At WORST  10/10 on the WORST day.    On average pain is rated as 8/10.   Today the pain is rated as 8/10  Symptoms interfere with daily activity and sleeping.   Exacerbating factors: Sitting, Standing, Bending, Walking, and Flexing.    Mitigating factors medications.     Interval History 6/21/2023:   Irvin Diaz Jr. is a 49 y.o. male presents to the clinic for follow up.  Since last visit the pain has has improved in some ways and worsened in others. The right hip has been bothering him more now. States that his right leg is clicking.     The pain is located in the left hip area and radiates to the right hip and down to his left knee .  The pain is described as aching and throbbing    At BEST  7/10   At WORST  9/10 on the WORST day.    On average pain is rated as 8/10.   Today the pain is rated as 8/10  Symptoms interfere with daily activity.   Exacerbating factors:  Bending.    Mitigating factors medications (tramadol not oxycodone).     Interval History 1/25/2023:     Irvin Diaz Jr. is a 49 y.o. male presents to the clinic for follow up.  Since last visit the pain has has moderately improved. Reports that he went on a cruise and noticed his pain was doing better.  He has stopped using the cane because it has been better.  He went back to tramadol because he did not think he required the oxycodone. Pain 8/10 today. Feels like a tolerable 8.    Interval History 10/31/2022:     Irvin Diaz Jr. is a 49 y.o. male presents to the clinic for follow up.  Since last visit the pain has has significantly worsened.    The pain is located in the L hip area and radiates to the L foot .  The pain is described as aching, dull, shooting, stabbing, tight band, and tingling    At BEST  8/10   At WORST  10/10 on the WORST day.    On average pain is rated as 7/10.   Today the pain is rated as 8/10  Symptoms interfere with daily activity, sleeping, and work.   Exacerbating factors: Standing, Bending, Coughing/Sneezing, Walking, and Getting out of bed/chair.    Mitigating factors nothing and medications.     Interval History 9/7/2022:     Irvin Diaz Jr. is a 49 y.o. male presents to the clinic for follow up.  Since last visit the pain has has worsened. He is now using a walker full time for the last 2 weeks. In a wheelchair today. He is unable to describe if his limited mobility is due to just pain or maybe a component of weakness.     The pain is located in the L hip area and radiates to the L foot .  The pain is described as aching, shooting, and stabbing    At BEST  7/10   At WORST  10/10 on the WORST day.    On average pain is rated as 8/10.   Today the pain is rated as 10/10  Symptoms interfere with daily activity, sleeping, and work.   Exacerbating factors: Standing, Laying, Bending, Walking, Lifting, and Getting out of bed/chair.    Mitigating factors none.     Interval  History 8/2/2022:     Irvin Diaz Jr. is a 49 y.o. male presents to the clinic for follow up.  Since last visit the pain has is unchanged.  He stopped the Oxycodone because it gave severe, lucid dreams.  Ran out of tramadol and has not been taking anything for his pain.    The pain is located in the left hip area and radiates to the left knee/ groin .  The pain is described as aching, shooting and stabbing    At BEST  8/10   At WORST  10/10 on the WORST day.    On average pain is rated as 8/10.   Today the pain is rated as 8/10  Symptoms interfere with daily activity, sleeping and work.   Exacerbating factors: Standing, Walking and Getting out of bed/chair.    Mitigating factors nothing, heat, ice, medications and rest.     Interval History 7/5/2022:     Irvin Diaz Jr. is a 49 y.o. male presents to the clinic for follow up.  Since last visit the pain has is unchanged.  He is s/p hip block that was complicated by hematoma in the pectineus muscle.  His Hgb has dropped, although the hematoma is resolving.  Recommended that patient go to ED as recommended by transplant.    The pain is located in the left hip area and radiates to the left leg/ groin  .  The pain is described as aching, shooting and stabbing    At BEST  8/10   At WORST  10/10 on the WORST day.    On average pain is rated as 8/10.   Today the pain is rated as 8/10  Symptoms interfere with daily activity, sleeping and work.   Exacerbating factors: Standing, Walking and Getting out of bed/chair.    Mitigating factors nothing, heat, ice, medications and rest.     Interval History 5/30/2022:     Irvin Diaz Jr. is a 49 y.o. male presents to the clinic for follow up.  Since last visit the pain has is unchanged. He missed his original procedure date due to not checking his voicemail and transportation issues. He is still interested in the procedure.  WE had a long talk about bleeding risk with his low platelets and high INR.    The pain is  located in the left hip area and radiates to the left leg/groin .  The pain is described as aching, shooting and stabbing    At BEST  8/10   At WORST  10/10 on the WORST day.    On average pain is rated as 8/10.   Today the pain is rated as 8/10  Symptoms interfere with daily activity, sleeping and work.   Exacerbating factors: walking.    Mitigating factors nothing.     Initial Hx:  Irvin Diaz Jr. is a 49 y.o. male presents to the clinic for the evaluation of left hip pain. The pain started 3 years ago following fall and symptoms have been worsening. The patient states used to get hip injections of the left hip.  He is unable to walk without a cane.  He states that he was told after imaging that he had a fracture in 2019.  He had an injection (and aspiration) in December/january and it helps the pain for about 3 weeks.  After the injections he still needs the pain but it helps.  The ortho physician at [facility]    CT note about the hip:  There is collapse of the left femoral head superior portion with bone-on-bone as well as underlying femoral sclerotic changes suggesting AVN with severe secondary degenerative changes of the acetabulum and the left hip effusion stable since prior exam.     The patient says that he has seen a ortho surgeon in the past and that they are unable to do surgery due to his liver failure.    Pain Description:    The pain is located in the left hip area and radiates to the left leg/ groin area .    At BEST  8/10   At WORST  10/10 on the WORST day.    On average pain is rated as 8/10.   Today the pain is rated as 8/10  The pain is continuous.  The pain is described as aching, shooting and throbbing    Symptoms interfere with daily activity, sleeping and work.   Exacerbating factors: Standing, Laying, Bending, Walking, Night Time, Morning, Flexing, Lifting and Getting out of bed/chair.    Mitigating factors laying down and medications.   He reports 5 hours of sleep per  night.    Physical Therapy/Home Exercise: No, not currently in physical therapy or home exercise program    Current Pain Medications:    - none    Failed Pain Medications:    - cannot take NSAIDs due to gastric bleeding, cannot take tylenol due to liver failure.     Pain Treatment Therapies:    Pain procedures: hip injections  Physical Therapy: physical therapy made things worse  Chiropractor: none  Acupuncture: none  TENS unit: none  Spinal decompression: none  Joint replacement: none    Patient denies urinary incontinence, bowel incontinence and loss of sensations. (+) weakness in the left leg  Patient denies any suicidal or homicidal ideations     report:  Reviewed and consistent with medication use as prescribed.    Imaging:   XR abdomen 03/2022:  Please note the hemidiaphragms are not included in their entirety.  Multiple surgical clips and presumed anastomotic suture line project over the right abdomen.  There are a few mildly prominent loops of gas-filled small bowel loops present measuring up to 3.3 cm in the left abdomen.  Limited evaluation of free intraperitoneal air to patient positioning/technique.  Calcifications project over the pelvis, likely phleboliths.  Osseous structures demonstrate significant degenerative change of the left hip with chronic appearing deformity/collapse of the left femoral head.    CT abdomen 03/2022:  Mild circumferential wall thickening involving the proximal colon extending from the ileocolic anastomosis at the hepatic flexure through around the level of the splenic flexure suggests inflammatory or infectious colitis.  No convincing transmural inflammation is seen.     Postoperative changes of proximal colectomy and scattered mild diverticulosis of the more distal colon.  No convincing diverticulitis, bowel obstruction, free air or abscess.     Findings of cirrhosis and mild abdominal ascites as described essentially stable.     Left hip findings suggesting AVN as  described.     This report was flagged in Epic as abnormal.     No other significant or acute findings.       Past Medical History:   Diagnosis Date    Alcohol withdrawal 5/1/2022    Alcoholic cirrhosis of liver     Alcoholic ketoacidosis 6/6/2021    Arthritis     ATN (acute tubular necrosis)     CHF (congestive heart failure)     Diverticulitis 01/2020    Esophageal varices     GERD (gastroesophageal reflux disease)     GI bleed     Hip arthritis     Left    Hypertension     Liver cirrhosis     Macrocytic anemia     Pulmonary embolism 08/2018    Unprovoked DVT.  Stop Coumadin due to GIB    Thrombocytopenia      Past Surgical History:   Procedure Laterality Date    ANKLE SURGERY      BLOCK, NERVE, PERIPHERAL Left 06/24/2022    Procedure: Left Hip femoral-obturator accessory nerve block;  Surgeon: Robbin De La Garza DO;  Location: [facility] OR;  Service: Pain Management;  Laterality: Left;    COLON SURGERY  2007    COLONOSCOPY  09/05/2019    [facility]    COLONOSCOPY N/A 02/17/2021    Procedure: COLONOSCOPY;  Surgeon: Renea Billingsley MD;  Location: [facility];  Service: Endoscopy;  Laterality: N/A;    COLONOSCOPY N/A 02/13/2023    Procedure: COLONOSCOPY;  Surgeon: Rudy Orellana MD;  Location: [facility] ([address] [place] [address]);  Service: Endoscopy;  Laterality: N/A;  cirrhosis-labs done on 1/11/23  instr portal-GT  No answer for precall- [place]    COLONOSCOPY N/A 03/10/2023    Procedure: COLONOSCOPY;  Surgeon: Rudy Orellana MD;  Location: [facility] ([address] [place] [address]);  Service: Endoscopy;  Laterality: N/A;  inst via poral per pt request    COLONOSCOPY W/ BIOPSIES  02/17/2021    ECHOCARDIOGRAM,TRANSESOPHAGEAL N/A 02/10/2025    Procedure: Transesophageal echo (ALLAN) intra-procedure log documentation;  Surgeon: Yunior Abreu MD;  Location: [facility] CATH/EP LAB;  Service: Cardiology;  Laterality: N/A;    ESOPHAGOGASTRODUODENOSCOPY N/A 07/26/2019    Procedure: EGD (ESOPHAGOGASTRODUODENOSCOPY);  Surgeon: Brian Trivedi MD;  Location: [facility];  Service:  Endoscopy;  Laterality: N/A;    ESOPHAGOGASTRODUODENOSCOPY N/A 04/08/2020    Procedure: EGD (ESOPHAGOGASTRODUODENOSCOPY);  Surgeon: Donn Acuna MD;  Location: [facility];  Service: Endoscopy;  Laterality: N/A;    ESOPHAGOGASTRODUODENOSCOPY N/A 01/03/2021    Procedure: EGD (ESOPHAGOGASTRODUODENOSCOPY)- coffee ground emesis, hx varices;  Surgeon: Steve Chairez MD;  Location: [facility];  Service: Endoscopy;  Laterality: N/A;    ESOPHAGOGASTRODUODENOSCOPY N/A 05/03/2021    Procedure: EGD (ESOPHAGOGASTRODUODENOSCOPY);  Surgeon: Jeanmarie Ngo MD;  Location: [facility];  Service: Endoscopy;  Laterality: N/A;    ESOPHAGOGASTRODUODENOSCOPY N/A 07/19/2021    Procedure: ESOPHAGOGASTRODUODENOSCOPY (EGD);  Surgeon: Claudio Medeiros MD;  Location: [facility];  Service: Endoscopy;  Laterality: N/A;    ESOPHAGOGASTRODUODENOSCOPY  12/20/2021    ESOPHAGOGASTRODUODENOSCOPY N/A 12/20/2021    Procedure: EGD (ESOPHAGOGASTRODUODENOSCOPY);  Surgeon: Ajay Jackson MD;  Location: [facility];  Service: Endoscopy;  Laterality: N/A;    ESOPHAGOGASTRODUODENOSCOPY N/A 05/03/2022    Procedure: EGD (ESOPHAGOGASTRODUODENOSCOPY);  Surgeon: Brian Trivedi MD;  Location: [facility];  Service: Endoscopy;  Laterality: N/A;    ESOPHAGOGASTRODUODENOSCOPY N/A 07/07/2022    Procedure: EGD (ESOPHAGOGASTRODUODENOSCOPY);  Surgeon: Ajay Jackson MD;  Location: [facility];  Service: Endoscopy;  Laterality: N/A;    ESOPHAGOGASTRODUODENOSCOPY N/A 11/29/2022    Procedure: EGD (ESOPHAGOGASTRODUODENOSCOPY);  Surgeon: Edouard Maynard MD;  Location: [facility] ([address] [place] [address]);  Service: Endoscopy;  Laterality: N/A;    ESOPHAGOGASTRODUODENOSCOPY N/A 04/28/2023    Procedure: EGD (ESOPHAGOGASTRODUODENOSCOPY);  Surgeon: Caesar Dickens MD;  Location: [facility];  Service: Endoscopy;  Laterality: N/A;    ESOPHAGOGASTRODUODENOSCOPY N/A 03/28/2024    Procedure: EGD (ESOPHAGOGASTRODUODENOSCOPY);  Surgeon: Jeanmarie Ngo MD;  Location: [facility];   Service: Endoscopy;  Laterality: N/A;    FRACTURE SURGERY      HERNIA REPAIR Left     Inguinal    INJECTION OF JOINT Right 2023    Procedure: RT Hip Injection;  Surgeon: Robbin De La Garza DO;  Location: [facility];  Service: Pain Management;  Laterality: Right;  oral - 20 mins    INJECTION OF JOINT Right 10/08/2024    Procedure: Right hip injection;  Surgeon: Robbin De La Garza DO;  Location: [facility];  Service: Pain Management;  Laterality: Right;  No sed no AC    UPPER GASTROINTESTINAL ENDOSCOPY N/A 2022     Social History     Socioeconomic History    Marital status:    Tobacco Use    Smoking status: Former     Current packs/day: 0.00     Types: Cigarettes     Quit date:      Years since quittin.    Smokeless tobacco: Never    Tobacco comments:     quit 20 years ago   Substance and Sexual Activity    Alcohol use: Not Currently     Comment: Quit drinking 21    Drug use: Not Currently    Sexual activity: Yes     Comment: occ     Social Drivers of Health     Financial Resource Strain: Low Risk  (2025)    Overall Financial Resource Strain (CARDIA)     Difficulty of Paying Living Expenses: Not hard at all   Food Insecurity: No Food Insecurity (2025)    Hunger Vital Sign     Worried About Running Out of Food in the Last Year: Never true     Ran Out of Food in the Last Year: Never true   Transportation Needs: No Transportation Needs (2025)    TRANSPORTATION NEEDS     Transportation : No   Physical Activity: Inactive (2024)    Exercise Vital Sign     Days of Exercise per Week: 0 days     Minutes of Exercise per Session: 0 min   Stress: No Stress Concern Present (2025)    [employer] - Occupational Stress Questionnaire     Feeling of Stress : Not at all   Housing Stability: Unknown (2025)    Housing Stability Vital Sign     Unable to Pay for Housing in the Last Year: No     Homeless in the Last Year:  No     Family History   Problem Relation Name Age of Onset    Hypertension Mother pat conor     Breast cancer Mother pat conro     Hypertension Father Irvin Diaz Sr     Prostate cancer Father Irvin Diaz Sr     Bladder Cancer Father Irvin Diaz Sr        Review of patient's allergies indicates:   Allergen Reactions    Ciprofloxacin hcl Hallucinations    Meperidine Hives     Pt medicated w/multiple medications (demerol, protonix, and zofran)  w/i 10 mins time frame prior to developing localized hives near IV site that med was administered. Pt reports he has/takes all other medications on daily basis.     Morphine Itching    Nsaids (non-steroidal anti-inflammatory drug)      Kidney Disease    Tylenol [acetaminophen]      Hx of liver disease       Current Outpatient Medications   Medication Sig    ferrous gluconate (FERATE) 240 (27 FE) MG tablet Take 2 tablets (480 mg total) by mouth 2 (two) times daily with meals.    folic acid (FOLVITE) 1 MG tablet Take 1 tablet (1 mg total) by mouth once daily.    metoprolol tartrate (LOPRESSOR) 50 MG tablet Take 1 tablet (50 mg total) by mouth 2 (two) times daily. Do not take if heart rate is less than 60    nortriptyline (PAMELOR) 25 MG capsule Take 1 capsule (25 mg total) by mouth every evening.    papaverine 30 mg/mL injection Add phentolamine 10 mgs/ml., add PGE1  100 micrograms.    rifAXIMin (XIFAXAN) 550 mg Tab Take 1 tablet (550 mg total) by mouth 2 (two) times daily.    spironolactone (ALDACTONE) 25 MG tablet Take 1 tablet (25 mg total) by mouth once daily.    tadalafiL (CIALIS) 20 MG Tab Take 1 tablet (20 mg total) by mouth daily as needed (erectile dysfunction).    tamsulosin (FLOMAX) 0.4 mg Cap Take 1 capsule (0.4 mg total) by mouth once daily.    thiamine 100 MG tablet Take 1 tablet (100 mg total) by mouth once daily.    traMADoL (ULTRAM) 50 mg tablet Take 1 tablet (50 mg total) by mouth 4 (four) times daily as needed for Pain. (Patient not taking:  Reported on 3/24/2025)     No current facility-administered medications for this visit.       REVIEW OF SYSTEMS:    GENERAL:  No weight loss, malaise or fevers.   HEENT:   No recent changes in vision or hearing   NECK:  Negative for lumps, no difficulty with swallowing.  RESPIRATORY:  Negative for cough, wheezing or shortness of breath, patient denies any recent URI.  CARDIOVASCULAR:  Negative for chest pain, leg swelling or palpitations.  GI:  Negative for abdominal discomfort, blood in stools or black stools or change in bowel habits.  MUSCULOSKELETAL:  See HPI.  SKIN:  Negative for lesions, rash, and itching. + skin itching  PSYCH:  No mood disorder or recent psychosocial stressors.  Patients sleep is not disturbed secondary to pain.  HEMATOLOGY/LYMPHOLOGY:  Negative for prolonged bleeding, bruising easily or swollen nodes.  Patient is not currently taking any anti-coagulants  NEURO:   No history of headaches, syncope, paralysis, seizures or tremors.  All other reviewed and negative other than HPI.    OBJECTIVE:    /78 (BP Location: Right arm, Patient Position: Sitting)   Pulse 80   Ht 6' (1.829 m)   Wt 111.5 kg (245 lb 13 oz)   BMI 33.34 kg/m²     PHYSICAL EXAMINATION:    GENERAL: Fraile, in no acute distress, alert and oriented x3.  PSYCH:  Mood and affect appropriate.  SKIN: Skin color, texture, turgor normal, no rashes or lesions on visible skin.  HEAD/FACE:  Normocephalic, atraumatic. Cranial nerves grossly intact.  CV: RRR with palpation of the radial artery.  PULM: CTAB. No evidence of respiratory difficulty, symmetric chest rise.  GI:  Soft    MUSKULOSKELETAL:    EXTREMITIES:     Left wrist  (+) edema  (+) pain with wrist flexion and extension  (+) TTP at the ulnar/wrist junction    Left Hip Exam (limited ROM and exam 2/2/ pain)  - Log Roll Did not perform  - FADIR Did not perform  - Stinchfield Did not perform  - Hip Scour Did not perform  - GTB Tenderness Positive   -TTP over anterior hip  joint. Posterior not palpated  - TTP of the superior knee joint.    Right hip exam:  (-) log roll  (+) fadir  (+) TTP of the right groin  (+) Right leg 2+ pitting edema    Left knee:  TTP right medial knee  (-) edema  (-) crepitus  (+) pain with end ROM    MUSCULOSKELETAL:  Atrophy of the left leg compared to the right  No deformities, edema, or skin discoloration are noted on visible skin. Good capillary refill.     NEURO: Bilateral upper and lower extremity coordination and muscle stretch reflexes are physiologic and symmetric.      NEUROLOGICAL EXAM:  MENTAL STATUS: A x O x 3, good concentration, speech is fluent and goal directed  MEMORY: recent and remote are intact  CN: CN2-12 grossly intact  MOTOR: 5/5 in all muscle groups on the right. HF 3/5 on the left, 4/5 KE, 4/5 KF with pain  DTRs: 3+ intact symmetric patella and 2 + achilles  Sensation:    -yes Loss of sensation in a left lower L-1 and L-2 on the left distribution.  Babinski: absent     Gait: Cane, abnormal. Short stride. Favors left leg

## 2025-03-26 ENCOUNTER — RESULTS FOLLOW-UP (OUTPATIENT)
Dept: UROLOGY | Facility: CLINIC | Age: 50
End: 2025-03-26

## 2025-03-26 DIAGNOSIS — N30.00 ACUTE CYSTITIS WITHOUT HEMATURIA: Primary | ICD-10-CM

## 2025-03-26 RX ORDER — NITROFURANTOIN 25; 75 MG/1; MG/1
100 CAPSULE ORAL 2 TIMES DAILY
Qty: 14 CAPSULE | Refills: 0 | Status: SHIPPED | OUTPATIENT
Start: 2025-03-26 | End: 2025-04-02

## 2025-03-27 ENCOUNTER — TELEPHONE (OUTPATIENT)
Dept: UROLOGY | Facility: CLINIC | Age: 50
End: 2025-03-27
Payer: MEDICARE

## 2025-03-27 NOTE — TELEPHONE ENCOUNTER
Called patient and explained the urine results, patient states verbal understanding. He states he did start the antibiotic that was sent in 2 days ago. Informed patient to complete antibiotics, even if he feels better.

## 2025-03-31 ENCOUNTER — TELEPHONE (OUTPATIENT)
Dept: PAIN MEDICINE | Facility: CLINIC | Age: 50
End: 2025-03-31
Payer: MEDICARE

## 2025-03-31 DIAGNOSIS — M87.052 AVASCULAR NECROSIS OF HIP, LEFT: ICD-10-CM

## 2025-03-31 DIAGNOSIS — G89.4 CHRONIC PAIN SYNDROME: ICD-10-CM

## 2025-03-31 DIAGNOSIS — F11.90 CHRONIC, CONTINUOUS USE OF OPIOIDS: ICD-10-CM

## 2025-03-31 RX ORDER — TRAMADOL HYDROCHLORIDE 50 MG/1
50 TABLET ORAL 4 TIMES DAILY PRN
Qty: 120 EACH | Refills: 0 | Status: SHIPPED | OUTPATIENT
Start: 2025-03-31 | End: 2025-04-30

## 2025-03-31 NOTE — TELEPHONE ENCOUNTER
----- Message from Med Assistant Krystal sent at 3/31/2025 11:10 AM CDT -----  Contact: [phone] Patient  Good morning Dr. Robertson,Please see attached Rx refill request & preferred pharmacy for Tramadol 50 mg. Thank you.  ----- Message -----  From: Sharon Alvarez  Sent: 3/31/2025   9:52 AM CDT  To: Marcelo Siegel Staff    PHARMACY CHANGEPLEASE SEND TO [facility] NOT [facility]. Thank youRequesting an RX refill or new RX.Is this a refill or new RX: newRX name and strength (copy/paste from chart):  traMADoL (ULTRAM) 50 mg tabletIs this a 30 day or 90 day RX: Pharmacy name and phone # (copy/paste from chart):  [id number] - [address] & [address] [id number]: [phone] Fax: [phone]

## 2025-04-01 ENCOUNTER — RESULTS FOLLOW-UP (OUTPATIENT)
Dept: PRIMARY CARE CLINIC | Facility: CLINIC | Age: 50
End: 2025-04-01
Payer: MEDICARE

## 2025-04-01 DIAGNOSIS — M79.89 LEFT LEG SWELLING: Primary | ICD-10-CM

## 2025-04-03 ENCOUNTER — OFFICE VISIT (OUTPATIENT)
Dept: INFECTIOUS DISEASES | Facility: CLINIC | Age: 50
End: 2025-04-03
Payer: MEDICARE

## 2025-04-03 VITALS
HEIGHT: 72 IN | SYSTOLIC BLOOD PRESSURE: 124 MMHG | OXYGEN SATURATION: 96 % | HEART RATE: 72 BPM | BODY MASS INDEX: 32.64 KG/M2 | WEIGHT: 241 LBS | TEMPERATURE: 98 F | DIASTOLIC BLOOD PRESSURE: 68 MMHG

## 2025-04-03 DIAGNOSIS — R78.81 MSSA BACTEREMIA: Primary | ICD-10-CM

## 2025-04-03 DIAGNOSIS — N41.0 ACUTE PROSTATITIS: ICD-10-CM

## 2025-04-03 DIAGNOSIS — R60.0 BILATERAL LEG EDEMA: ICD-10-CM

## 2025-04-03 DIAGNOSIS — B95.61 MSSA BACTEREMIA: Primary | ICD-10-CM

## 2025-04-03 DIAGNOSIS — K70.31 ALCOHOLIC CIRRHOSIS OF LIVER WITH ASCITES: ICD-10-CM

## 2025-04-03 PROCEDURE — 99999 PR PBB SHADOW E&M-EST. PATIENT-LVL III: CPT | Mod: PBBFAC,,, | Performed by: INTERNAL MEDICINE

## 2025-04-03 PROCEDURE — 99213 OFFICE O/P EST LOW 20 MIN: CPT | Mod: PBBFAC,PN | Performed by: INTERNAL MEDICINE

## 2025-04-03 NOTE — PATIENT INSTRUCTIONS
I will see you again in 6 weeks. Anticipate DC you from ID clinic at that time if all remains okay  Your PCP and GI doctors can adjust your medications to manage your leg swelling

## 2025-04-03 NOTE — PROGRESS NOTES
Subjective:      Reason for consult:  Hospital follow up    HPI: Irvin Diaz Jr. is a 49 y.o. male with history of alcoholic cirrhosis with portal hypertension, hypertension, GERD.  In an episode of hematuria in October 20, 2024.  Urine culture that time apparently grew MSSA.  Treated with antibiotics with resolution.  Appears he was lost to follow-up to Urology Service.     Started feeling unwell January 2025 around the snowstorm time.  Then started having hematuria again and later developed left foot swelling and pain.  Seen by his PCP on 01/29/2025.  He was diagnosed with cellulitis left foot than prescribed Keflex.  Presented to ER 1/30/25 for evaluation.  Uric acid level was high.  X-ray foot is unremarkable SR 4 screws in the medial malleolus area.  He was given steroid and discharged on allopurinol.  Staphylococcus aureus.  Back in the ER again on 02/04/2025 with left foot pain.  Stabilized and discharged once again.     Hematuria persisted. Left foot swelling also positive but did improve.  Saw Urology NP 02/06/2025.  Called back to the ER because blood culture 02/04/2025 grew.  Noncontrast CT abdomen and pelvis shows cirrhosis but no other acute findings.  Noncontrast CT left ankle and foot shows cellulitis and no abscess.  MRI left ankle and foot documented tenosynovitis but no abscess or osteomyelitis.  Blood cultures later grew MSSA.  TTE and ALLAN were negative for vegetation.  He improved on antibiotics and was discharged on IV cefazolin to complete treatment on 03/07/2025.    He has done well post discharge.  No other acute issues.  Tolerating antibiotics okay.    04/03/2025:  He has completed antibiotics.  No other acute issues CSF for persistent bilateral lower extremity edema.  Also complaining of urine frequency.        Review of patient's allergies indicates:   Allergen Reactions    Ciprofloxacin hcl Hallucinations    Meperidine Hives     Pt medicated w/multiple medications (demerol,  protonix, and zofran)  w/i 10 mins time frame prior to developing localized hives near IV site that med was administered. Pt reports he has/takes all other medications on daily basis.     Morphine Itching    Nsaids (non-steroidal anti-inflammatory drug)      Kidney Disease    Tylenol [acetaminophen]      Hx of liver disease     Past Medical History:   Diagnosis Date    Alcohol withdrawal 5/1/2022    Alcoholic cirrhosis of liver     Alcoholic ketoacidosis 6/6/2021    Arthritis     ATN (acute tubular necrosis)     CHF (congestive heart failure)     Diverticulitis 01/2020    Esophageal varices     GERD (gastroesophageal reflux disease)     GI bleed     Hip arthritis     Left    Hypertension     Liver cirrhosis     Macrocytic anemia     Pulmonary embolism 08/2018    Unprovoked DVT.  Stop Coumadin due to GIB    Thrombocytopenia      Past Surgical History:   Procedure Laterality Date    ANKLE SURGERY      BLOCK, NERVE, PERIPHERAL Left 06/24/2022    Procedure: Left Hip femoral-obturator accessory nerve block;  Surgeon: Robbin De La Garza DO;  Location: [facility];  Service: Pain Management;  Laterality: Left;    COLON SURGERY  2007    COLONOSCOPY  09/05/2019    [facility]    COLONOSCOPY N/A 02/17/2021    Procedure: COLONOSCOPY;  Surgeon: Renea Billingsley MD;  Location: [facility];  Service: Endoscopy;  Laterality: N/A;    COLONOSCOPY N/A 02/13/2023    Procedure: COLONOSCOPY;  Surgeon: Rudy Orellana MD;  Location: [facility] ([address] [place] [address]);  Service: Endoscopy;  Laterality: N/A;  cirrhosis-labs done on 1/11/23  instr portal-GT  No answer for precall- [place]    COLONOSCOPY N/A 03/10/2023    Procedure: COLONOSCOPY;  Surgeon: Rudy Orellana MD;  Location: [facility]  Service: Endoscopy;  Laterality: N/A;  inst via poral per pt request    COLONOSCOPY W/ BIOPSIES  02/17/2021    ECHOCARDIOGRAM,TRANSESOPHAGEAL N/A 02/10/2025    Procedure: Transesophageal echo (ALLAN) intra-procedure log documentation;  Surgeon: Yunior Abreu MD;  Location:  [facility] C[facility]/EP [facility];  Service: Cardiology;  Laterality: N/A;    ESOPHAGOGASTRODUODENOSCOPY N/A 07/26/2019    Procedure: EGD (ESOPHAGOGASTRODUODENOSCOPY);  Surgeon: Brian Trivedi MD;  Location: [facility];  Service: Endoscopy;  Laterality: N/A;    ESOPHAGOGASTRODUODENOSCOPY N/A 04/08/2020    Procedure: EGD (ESOPHAGOGASTRODUODENOSCOPY);  Surgeon: Donn Acuna MD;  Location: [facility];  Service: Endoscopy;  Laterality: N/A;    ESOPHAGOGASTRODUODENOSCOPY N/A 01/03/2021    Procedure: EGD (ESOPHAGOGASTRODUODENOSCOPY)- coffee ground emesis, hx varices;  Surgeon: Steve Chairez MD;  Location: [facility];  Service: Endoscopy;  Laterality: N/A;    ESOPHAGOGASTRODUODENOSCOPY N/A 05/03/2021    Procedure: EGD (ESOPHAGOGASTRODUODENOSCOPY);  Surgeon: Jeanmarie Ngo MD;  Location: [facility];  Service: Endoscopy;  Laterality: N/A;    ESOPHAGOGASTRODUODENOSCOPY N/A 07/19/2021    Procedure: ESOPHAGOGASTRODUODENOSCOPY (EGD);  Surgeon: Claudio Medeiros MD;  Location: [facility];  Service: Endoscopy;  Laterality: N/A;    ESOPHAGOGASTRODUODENOSCOPY  12/20/2021    ESOPHAGOGASTRODUODENOSCOPY N/A 12/20/2021    Procedure: EGD (ESOPHAGOGASTRODUODENOSCOPY);  Surgeon: Ajay Jackson MD;  Location: [facility];  Service: Endoscopy;  Laterality: N/A;    ESOPHAGOGASTRODUODENOSCOPY N/A 05/03/2022    Procedure: EGD (ESOPHAGOGASTRODUODENOSCOPY);  Surgeon: Brian Trivedi MD;  Location: [facility];  Service: Endoscopy;  Laterality: N/A;    ESOPHAGOGASTRODUODENOSCOPY N/A 07/07/2022    Procedure: EGD (ESOPHAGOGASTRODUODENOSCOPY);  Surgeon: Ajay Jackson MD;  Location: [facility];  Service: Endoscopy;  Laterality: N/A;    ESOPHAGOGASTRODUODENOSCOPY N/A 11/29/2022    Procedure: EGD (ESOPHAGOGASTRODUODENOSCOPY);  Surgeon: Edouard Maynard MD;  Location: [address]);  Service: Endoscopy;  Laterality: N/A;    ESOPHAGOGASTRODUODENOSCOPY N/A 04/28/2023    Procedure: EGD (ESOPHAGOGASTRODUODENOSCOPY);  Surgeon: Caesar Dickens MD;   Location: [facility];  Service: Endoscopy;  Laterality: N/A;    ESOPHAGOGASTRODUODENOSCOPY N/A 2024    Procedure: EGD (ESOPHAGOGASTRODUODENOSCOPY);  Surgeon: Jeanmarie Ngo MD;  Location: [facility];  Service: Endoscopy;  Laterality: N/A;    FRACTURE SURGERY      HERNIA REPAIR Left     Inguinal    INJECTION OF JOINT Right 2023    Procedure: RT Hip Injection;  Surgeon: Robbin De La Garza DO;  Location: [facility];  Service: Pain Management;  Laterality: Right;  oral - 20 mins    INJECTION OF JOINT Right 10/08/2024    Procedure: Right hip injection;  Surgeon: Robbin De La Garza DO;  Location: [facility];  Service: Pain Management;  Laterality: Right;  No sed no AC    UPPER GASTROINTESTINAL ENDOSCOPY N/A 2022      Social History     Tobacco Use    Smoking status: Former     Current packs/day: 0.00     Types: Cigarettes     Quit date:      Years since quittin.2    Smokeless tobacco: Never    Tobacco comments:     quit 20 years ago   Substance Use Topics    Alcohol use: Not Currently     Comment: Quit drinking 21     Family History   Problem Relation Name Age of Onset    Hypertension Mother pat conor     Breast cancer Mother pat conor     Hypertension Father Irvin Joe Sr     Prostate cancer Father Irvin Joe Sr     Bladder Cancer Father Irvin Estradaruder Sr        Pertinent medications noted:     Review of Systems  ten system review unremarkable.  As in HPI    Outdoor activities:  Lives with his wife.  No pets at home.  No alcohol  Travel:  No recent travel  Implants:  None  Antibiotic History:  As above.  Currently on cefazolin      Objective:      Height 6' (1.829 m), weight 109.3 kg (241 lb). Body mass index is 32.69 kg/m².  Physical Exam      General:  Middle-aged man in no acute distress   HEENT: No oral thrush   CVS: S1 and 2 heard, no murmurs appreciated  Respiratory:  Clear to auscultation   Abdomen:  Full, soft, nontender, no  palpable organomegaly  Skin: No rash   CNS:  No gross focal deficits   Musculoskeletal: No joint or bony abnormalities appreciated  Lower extremity: Bilateral ankle edema up to mid leg    Wound:  None    VAD:  None      General Labs reviewed:  Lab Results   Component Value Date    WBC 5.23 03/24/2025    RBC 2.84 (L) 03/24/2025    HGB 9.8 (L) 03/24/2025    HCT 29.9 (L) 03/24/2025     (H) 03/24/2025    MCH 34.5 03/24/2025    MCHC 32.8 03/24/2025    RDW 15.7 (H) 03/24/2025    PLT 83 (L) 03/24/2025    MPV 10.5 03/24/2025    GRAN 1.9 03/03/2025    GRAN 53.4 03/03/2025    LYMPH 21.4 03/24/2025    LYMPH 1.12 03/24/2025    MONO 9.6 03/24/2025    MONO 0.50 03/24/2025    EOS 2.5 03/24/2025    EOS 0.13 03/24/2025    BASO 0.03 03/03/2025    EOSINOPHIL 2.5 03/03/2025    BASOPHIL 0.4 03/24/2025    BASOPHIL 0.02 03/24/2025     CMP  Sodium   Date Value Ref Range Status   03/03/2025 134 (L) 136 - 145 mmol/L Final   09/02/2018 135 134 - 144 mmol/L      Potassium   Date Value Ref Range Status   03/03/2025 3.9 3.5 - 5.1 mmol/L Final     Chloride   Date Value Ref Range Status   03/03/2025 108 95 - 110 mmol/L Final   09/02/2018 102 98 - 110 mmol/L      CO2   Date Value Ref Range Status   03/03/2025 18 (L) 23 - 29 mmol/L Final     Glucose   Date Value Ref Range Status   03/03/2025 83 70 - 110 mg/dL Final   09/02/2018 99 70 - 99 mg/dL      BUN   Date Value Ref Range Status   03/03/2025 12 6 - 20 mg/dL Final     Creatinine   Date Value Ref Range Status   03/03/2025 1.3 0.5 - 1.4 mg/dL Final   09/02/2018 0.91 0.60 - 1.40 mg/dL      Calcium   Date Value Ref Range Status   03/03/2025 8.2 (L) 8.7 - 10.5 mg/dL Final     Total Protein   Date Value Ref Range Status   03/03/2025 7.3 6.0 - 8.4 g/dL Final     Albumin   Date Value Ref Range Status   03/03/2025 2.3 (L) 3.5 - 5.2 g/dL Final     Total Bilirubin   Date Value Ref Range Status   03/03/2025 3.0 (H) 0.1 - 1.0 mg/dL Final     Comment:     For infants and newborns, interpretation of  results should be based  on gestational age, weight and in agreement with clinical  observations.    Premature Infant recommended reference ranges:  Up to 24 hours.............<8.0 mg/dL  Up to 48 hours............<12.0 mg/dL  3-5 days..................<15.0 mg/dL  6-29 days.................<15.0 mg/dL       Alkaline Phosphatase   Date Value Ref Range Status   03/03/2025 98 40 - 150 U/L Final     AST   Date Value Ref Range Status   03/03/2025 40 10 - 40 U/L Final     ALT   Date Value Ref Range Status   03/03/2025 6 (L) 10 - 44 U/L Final     Anion Gap   Date Value Ref Range Status   03/03/2025 8 8 - 16 mmol/L Final     eGFR   Date Value Ref Range Status   03/03/2025 >60 >60 mL/min/1.73 m^2 Final           Micro:  Microbiology Results (last 7 days)       ** No results found for the last 168 hours. **          Imaging Reviewed:          Assessment:      High-grade MSSA bacteremia in a patient with recurrent MSSA bacteriuria.  TTE and ALLAN were negative for vegetation.  Treated.  Resolved.      2. Recurrent MSSA bacteriuria with hematuria.  CT abdomen and pelvis with contrast negative for prostate on renal abscess.  This was treated as acute prostatitis.  Completed antibiotics around 03/10/2025.  Clinically resolved.     3. Bilateral lower extremities edema related to cirrhosis.  Currently on on Aldactone 25 mg daily.  Optimize diuretics as per PCP and GI.      4. Compensated alcoholic cirrhosis.  Immune to HAV and HBV.  HCV screen negative on multiple locations.  Management as per PCP and GI     5. Macrocytic anemia.  Most likely related to cirrhosis.     6. CKD    7. Gout.  No acute flare at this time.    I will plan to see again in 6 weeks.  Anticipate DC from ID service at that time if everything remains okay.  Please call with questions.  Spent 30 minutes in his care today.    Problem List Items Addressed This Visit    None       Plan:     As above.      There are no diagnoses linked to this encounter.    This note  was created using Dragon voice recognition software that occasionally misinterpreted phrases or words.

## 2025-04-04 ENCOUNTER — TELEPHONE (OUTPATIENT)
Dept: VASCULAR SURGERY | Facility: CLINIC | Age: 50
End: 2025-04-04
Payer: MEDICARE

## 2025-04-04 DIAGNOSIS — R60.9 EDEMA, UNSPECIFIED TYPE: ICD-10-CM

## 2025-04-04 DIAGNOSIS — M79.89 LEG SWELLING: Primary | ICD-10-CM

## 2025-04-04 NOTE — TELEPHONE ENCOUNTER
Left message, appt was scheduled incorrectly. It was scheduled in the wrong block and without a u/s that is protocol prior to seeing a vascular provider .

## 2025-04-07 ENCOUNTER — HOSPITAL ENCOUNTER (OUTPATIENT)
Dept: RADIOLOGY | Facility: HOSPITAL | Age: 50
Discharge: HOME | End: 2025-04-07
Attending: INTERNAL MEDICINE
Payer: MEDICARE

## 2025-04-07 DIAGNOSIS — K70.31 ALCOHOLIC CIRRHOSIS OF LIVER WITH ASCITES: Chronic | ICD-10-CM

## 2025-04-07 PROCEDURE — 76705 ECHO EXAM OF ABDOMEN: CPT | Mod: TC

## 2025-04-07 PROCEDURE — 76705 ECHO EXAM OF ABDOMEN: CPT | Mod: 26,,, | Performed by: RADIOLOGY

## 2025-04-09 ENCOUNTER — PROCEDURE VISIT (OUTPATIENT)
Dept: UROLOGY | Facility: CLINIC | Age: 50
End: 2025-04-09
Payer: MEDICARE

## 2025-04-09 VITALS
DIASTOLIC BLOOD PRESSURE: 78 MMHG | HEIGHT: 72 IN | HEART RATE: 92 BPM | SYSTOLIC BLOOD PRESSURE: 133 MMHG | WEIGHT: 227.88 LBS | BODY MASS INDEX: 30.86 KG/M2

## 2025-04-09 DIAGNOSIS — B95.7 BACTEREMIA DUE TO COAGULASE-NEGATIVE STAPHYLOCOCCUS: ICD-10-CM

## 2025-04-09 DIAGNOSIS — N30.00 ACUTE CYSTITIS WITHOUT HEMATURIA: Primary | ICD-10-CM

## 2025-04-09 DIAGNOSIS — R78.81 BACTEREMIA DUE TO COAGULASE-NEGATIVE STAPHYLOCOCCUS: ICD-10-CM

## 2025-04-09 DIAGNOSIS — N40.1 BPH WITH OBSTRUCTION/LOWER URINARY TRACT SYMPTOMS: ICD-10-CM

## 2025-04-09 DIAGNOSIS — N13.8 BPH WITH OBSTRUCTION/LOWER URINARY TRACT SYMPTOMS: ICD-10-CM

## 2025-04-09 DIAGNOSIS — N41.0 PROSTATITIS, ACUTE: ICD-10-CM

## 2025-04-09 LAB
BILIRUB SERPL-MCNC: NORMAL MG/DL
BLOOD URINE, POC: NORMAL
CLARITY, POC UA: CLEAR
COLOR, POC UA: YELLOW
GLUCOSE UR QL STRIP: NORMAL
KETONES UR QL STRIP: NORMAL
LEUKOCYTE ESTERASE URINE, POC: NORMAL
NITRITE, POC UA: NORMAL
PH, POC UA: 6
PROTEIN, POC: NORMAL
SPECIFIC GRAVITY, POC UA: 1.01
UROBILINOGEN, POC UA: 8

## 2025-04-09 PROCEDURE — 81002 URINALYSIS NONAUTO W/O SCOPE: CPT | Mod: PBBFAC,PN | Performed by: STUDENT IN AN ORGANIZED HEALTH CARE EDUCATION/TRAINING PROGRAM

## 2025-04-09 PROCEDURE — 52000 CYSTOURETHROSCOPY: CPT | Mod: S$PBB,,, | Performed by: STUDENT IN AN ORGANIZED HEALTH CARE EDUCATION/TRAINING PROGRAM

## 2025-04-09 PROCEDURE — 99999PBSHW POCT URINE DIPSTICK WITHOUT MICROSCOPE: Mod: PBBFAC,,,

## 2025-04-09 PROCEDURE — 87086 URINE CULTURE/COLONY COUNT: CPT | Performed by: STUDENT IN AN ORGANIZED HEALTH CARE EDUCATION/TRAINING PROGRAM

## 2025-04-09 PROCEDURE — 52000 CYSTOURETHROSCOPY: CPT | Mod: PBBFAC,PN | Performed by: STUDENT IN AN ORGANIZED HEALTH CARE EDUCATION/TRAINING PROGRAM

## 2025-04-09 RX ORDER — LIDOCAINE HYDROCHLORIDE 20 MG/ML
JELLY TOPICAL
Status: COMPLETED | OUTPATIENT
Start: 2025-04-09 | End: 2025-04-09

## 2025-04-09 RX ORDER — SULFAMETHOXAZOLE AND TRIMETHOPRIM 800; 160 MG/1; MG/1
1 TABLET ORAL ONCE
Status: CANCELLED | OUTPATIENT
Start: 2025-04-09 | End: 2025-04-09

## 2025-04-09 RX ORDER — SULFAMETHOXAZOLE AND TRIMETHOPRIM 800; 160 MG/1; MG/1
1 TABLET ORAL ONCE
Status: COMPLETED | OUTPATIENT
Start: 2025-04-09 | End: 2025-04-09

## 2025-04-09 RX ADMIN — LIDOCAINE HYDROCHLORIDE: 20 JELLY TOPICAL at 11:04

## 2025-04-09 RX ADMIN — SULFAMETHOXAZOLE AND TRIMETHOPRIM 1 TABLET: 800; 160 TABLET ORAL at 11:04

## 2025-04-09 NOTE — PROCEDURES
Irvin Diaz Jr. is a 49 y.o. male patient.    Pulse: 92 (04/09/25 1127)  BP: 133/78 (04/09/25 1127)  Weight: 103.4 kg (227 lb 13.5 oz) (04/09/25 1127)  Height: 6' (182.9 cm) (04/09/25 1127)       Cystoscopy    Date/Time: 4/9/2025 11:44 AM    Performed by: Nestor Ma MD  Authorized by: Zaina Sevilla NP    Consent Done?:  Yes (Written)  Timeout: prior to procedure the correct patient, procedure, and site was verified    Prep: patient was prepped and draped in usual sterile fashion    Local anesthesia used?: Yes    Anesthesia:  Intraurethral instillation  Indications: recurrent UTIs and BPH    Position:  Supine  Anesthesia:  Intraurethral instillation  Preparation: Patient was prepped and draped in usual sterile fashion    Scope type:  Flexible cystoscope  Urethra normal: Yes    Prostate normal: No     Hyperplasia   Bilobar  Bladder neck normal: Yes    Bladder normal: Yes     patient tolerated the procedure well with no immediate complications  Comments:      Discussed options for management of BPH with UTIs on Flomax. Given comorbidities, would favor UroLift as a less invasive approach. Patient is amenable to this. Urine sent for culture.      4/9/2025

## 2025-04-10 ENCOUNTER — CLINICAL SUPPORT (OUTPATIENT)
Dept: PAIN MEDICINE | Facility: CLINIC | Age: 50
End: 2025-04-10
Payer: MEDICARE

## 2025-04-10 VITALS — DIASTOLIC BLOOD PRESSURE: 79 MMHG | HEART RATE: 85 BPM | SYSTOLIC BLOOD PRESSURE: 129 MMHG

## 2025-04-10 DIAGNOSIS — M16.11 PRIMARY OSTEOARTHRITIS OF RIGHT HIP: ICD-10-CM

## 2025-04-10 DIAGNOSIS — M17.12 PRIMARY OSTEOARTHRITIS OF LEFT KNEE: Primary | ICD-10-CM

## 2025-04-10 PROCEDURE — 99999 PR PBB SHADOW E&M-EST. PATIENT-LVL III: CPT | Mod: PBBFAC,,, | Performed by: STUDENT IN AN ORGANIZED HEALTH CARE EDUCATION/TRAINING PROGRAM

## 2025-04-10 PROCEDURE — 20611 DRAIN/INJ JOINT/BURSA W/US: CPT | Mod: PBBFAC | Performed by: STUDENT IN AN ORGANIZED HEALTH CARE EDUCATION/TRAINING PROGRAM

## 2025-04-10 PROCEDURE — 99999PBSHW PR PBB SHADOW TECHNICAL ONLY FILED TO HB: Mod: JZ,PBBFAC,,

## 2025-04-10 NOTE — PROCEDURES
Procedures  PROCEDURE: left Knee Synvisc (Hyaluronic) ONE Injection with Ultrasound Guidance    Patient Name: Irvin Diaz Jr.  MRN: [id number]    PROCEDURE DATE: 4/10/2025    Diagnosis: Chronic Knee Pain, Primary osteoarthritis of the knee  CPT code:  11019 (Arthrocentesis, aspiration and/or injection, large joint or bursa (e.g., shoulder, hip, knee, subacromial bursa); with ultrasound guidance) ;  (synvisc)  Postprocedural Diagnosis: Same  Needle Type: - Stimuplex 21G x 100mm needle    Solution injected: A 6 ml prefilled syringe of Synvisc    Injection # 2    Estimated Blood Loss - <2ml  Drains: None  Specimens Removed: None  Urine Output - Not Measured  Complications: None  Outcome: Good    Informed Consent:  The patient's condition and proposed procedures, risks (including complications of nerve damage,  bleeding, infection, and failure of pain relief), and alternatives were discussed with the patient or responsible party.  The patient's/responsible party's questions were answered.  The patient/responsible party appeared to understand and chose to proceed.  Informed consent was obtained.    Procedure in Detail:  The procedure was performed in the procedure treatment room.  After obtaining consent, the patient was placed in a supine position with the above noted knee in slight degree of flexion. The site for the injection was palpated, identified with ultrasound imaging and marked.    The skin overlying the target site of injection was prepped and draped in an aseptic fashion.      Procedural Pause:  A procedural pause verifying correct patient, medical record number, allergies, medications to be administered, current vital signs, and surgical site was performed immediately prior to beginning the procedure.    The skin and subcutaneous tissue overlying the target site of the injection was anesthetized using 3-5 ml of 1% lidocaine MPF with a 25-gauge, 1½ -inch needle.      The above noted needle was then  inserted horizontally, proximal to the superior and lateral edge of the patella, between the suprapatellar fat pad and prefemoral fat pad.  The needle was then slowly advanced until it slid into the suprapatellar synovial recess.  After negative aspiration for heme, the above noted solution was injected slowly.  The needle was then retracted and a sterile bandage was placed over the injection site.    The patient tolerated the procedure well and without complications. After meeting discharge criteria, the patient was discharged home safely.    The above noted procedure was not repeated on the opposite side.    DISCUSSION:  Today we performed a ultrasound guided knee injection.  The patient was informed that it may take several days for the medication to reach its effect and they should continue their regular pain medications as prescribed.  The patient was advised to relax and avoid any heavy lifting or excessive bending for 24 hours.   He was advised that he may return to his usual activities after 24 hours if he is otherwise feeling well.  The patient was advised not to bathe or soak in water for 24 hours but that showering would be acceptable.  The patient was instructed that if he experienced fever or chills, new weakness, new sensory changes, any changes in bowel or bladder habits, worsening back pain, new headache, neck stiffness, or other new symptoms, that he should contact the pain clinic immediately or dial 911 if unable to reach the pain clinic.      Note Electronically Signed By:  Robbin Abernathy

## 2025-04-10 NOTE — PROGRESS NOTES
Chronic Pain - f/u    Referring Physician: Robbin De La Garza,*    Date: 04/10/2025     Re: Irvin Diaz Jr.  MR#: [id number]  YOB: 1975  Age: 49 y.o.    Chief Complaint: hip pain  Chief Complaint   Patient presents with    Knee Pain     Bilateral knee pain     **This note is dictated using the M*Modal Fluency Direct word recognition program. There are word recognition mistakes that are occasionally missed on review.**    ASSESSMENT: 49 y.o. year old male with left hip pain, consistent with     1. Primary osteoarthritis of left knee  hylan g-f 20 (SYNVISC ONE) 48 mg/6 mL injection 48 mg      2. Primary osteoarthritis of right hip          PLAN:     Acute left wrist pain - resolved  -Rx for wrist brace  -Referral to ortho hand  -recommended he compelte the Xr that was ordered by PCP  -refilled tramadol x1 previously. now doing okay and off the tramadol again    Left knee pain likely OA 2/2 altered mechanics due to hip AVN  -pain with varus/valgus strain  -left knee x-ray shows KL grade 2 OA with medial joint space narrowing  -minimal edema. Pain worse with weight bearing.  -6/20/24 - knee injection CSI - 70%x1m  -He has failed PT in the knee before.  -He uses a knee brace.  -failed PT. Failed conservative therapy.  Cannot take NSAIDs due to low platelets  -MEDICAL NECESSITY FOR VISCOSUPPLEMENTATION USE: After thorough evaluation of the patient, I have determined that viscosupplementation treatment is medically necessary. The patient has painful degenerative joint disease (DJD) of the knee(s) with failure of conservative treatments including lifestyle modifications and rehabilitation exercises. Oral analgesics including NSAIDs have not adequately controlled the patient's symptoms or are contraindicated. There is radiographic evidence of Kellgren-Maury grade II (or greater) osteoarthritic (OA) changes, or if lack of radiographic evidence, there is arthroscopic or other evidence of chondrosis of  the knee(s).   10/10/24 - L knee synvisc-one inj. Helpful until fall the week before Tab - >50% until then  4/10/25 - L syncisc-one - Risks, benefits, and alternatives were discussed with the patient, and He would like to proceed.    Avascular necrosis of the left hip   -s/p hip block without significant pain relief and complicated by Hematoma.   -tolerating oxycodone 5 mg BID PRN (he thinks his lucid dreams were due to EtOH withdrawal). No more lucid dreams. Switch back to tramadol  -switched back to tramadol because he has been doing better and does not think that he needs the oxycodone anymore.  -STOPPED Tramadol QID. This was working for him. Helps him function. He has since stopped all opioids.  -temporary tramadol prescribed because hand started being really painful. Now back to normal and not requiring any tramadol.  - Medrol dose pack, helped while taking, but not sustained.  -not surgical candidate  -Not a candidate for further procedures due to bleeding risk  - Plts improved to ~110.  -buprenorphine likely not an option 2/2 to his ESLD and liver transplant status    Right hip pain  -XR Right hip given hx of avascular necrosis in the left hip. This showed moderate OA in the hip  9/28/23 - right hip injection - >50% relief for 6 months  10/8/24 - Right hip injection - no sed - no AC - 50% @ 3m. Recommended waiting as long as possible before a repeat.  -recommended minimizing repeats due to risks unless the pain becomes unbearable.    Allodynia  -stopped Nortriptyline to 50 mg nightly. Helps him sleep, but made him nauseous    Thorombocytopenia 2/2 cirrhosis  -platelets are 92 on 5/26/22, 82 on 9/7/22, 74 on 10/25/22, 110 on 5/18/23  -Avanos recommended above 50, but he had hematoma, so will not proceed.    Chronic Opioid Use  - UDS from October and September did not show opioids but he states he was decreasing use at that time.  -drug screen 05/2023 which was appropriate.  -UDS 6/2024 showed MJ.  UDS  in 09/2024 was appropriate.  -patient has stopped chronic opioids.    Cirrhosis 2/2 past EtOH. He states he is not drinking anymore.  -following with hepatology  -possible liver transplant?  -working with psychiatry for EtOH.    Kidney disease  -GFR 54    - RTC 4 months  - Counseled patient regarding the importance of  activity modification.    The above plan and management options were discussed at length with patient. Patient is in agreement with the above and verbalized understanding. It will be communicated with the referring physician via electronic record, fax, or mail.  Lab/study reports reviewed were important and necessary because subsequent medical and treatment recommendations required review of the above lab/study reports. Images viewed/reviewed above were important and necessary because subsequent medical and treatment recommendations required review of the reviewed image(s).     Electronically signed by:  Robbin De La Garza DO  04/10/2025    =========================================================================================================    SUBJECTIVE:      Interval History 4/10/2025:   Irvin Diaz David is a 49 y.o. male presents to the clinic for follow up.  Since last visit the pain has is unchanged.  Wrist pain is doing better.    The pain is located in the bilateral knees  area and radiates to the bilateral legs .  The pain is described as aching, burning, shooting, stabbing, and throbbing    At BEST  8/10   At WORST  10/10 on the WORST day.    On average pain is rated as 8/10.   Today the pain is rated as 10/10  Symptoms interfere with daily activity.   Exacerbating factors: Sitting, Standing, Walking, Night Time, Morning, Lifting, and Getting out of bed/chair.    Mitigating factors nothing.     Current pain medications: nothing  Failed Pain Medications: oxycodone, tramadol, gabapentin, cannot take NSAIDs due to gastric bleeding, cannot take tylenol due to liver failure. Tramadol  50mg QID    Pain procedures:  6/24/22 - left hip block - no relief. C/b hematoma formation  10/10/23 - Right hip injection - oral sed - no AC - >50% @ 6m  6/20/24 - L Knee - 70%x1m  10/8/24 - Right hip injection - no sed - no AC - >50%@3m  10/10/24 - L knee synvisc-1 inj. - >50% x2.5m  4/10/25@1pm - L syncisc-one     Interval History 3/24/2025:   Irvin Diaz Jr. is a 49 y.o. male presents to the clinic for follow up.  Since last visit the pain has is unchanged. The patient was recently admitted to the hospital for blood in his urine.  He had bacteremia 2/2 staph.  He was placed on IVB antibiotics with a picc line. The picc linew as removed on 2/27.  The wrist started bothering him last Wednesday/Thursday. Denies trauma.  (+) swelling.      The pain is located in the left wrist, left hip area and radiates to the left knee and ankle .  The pain is described as aching, shooting, and stabbing    At BEST  8/10   At WORST  10/10 on the WORST day.    On average pain is rated as 8/10.   Today the pain is rated as 8/10  Symptoms interfere with daily activity and sleeping.   Exacerbating factors: Sitting, Standing, Bending, and Flexing.    Mitigating factors medications.       Interval History 1/2/2025:   Irvin Diaz Jr. is a 49 y.o. male presents to the clinic for follow up.  Since last visit the pain has is unchanged. Hip pain and knee pain much better after injection until he had a fall just before valerie.  Right hip is still doing okay but the left knee has been acting up more.      The pain is located in the left hip area and radiates to the left knee and ankle .  The pain is described as aching, shooting, and stabbing    At BEST  8/10   At WORST  10/10 on the WORST day.    On average pain is rated as 8/10.   Today the pain is rated as 9/10  Symptoms interfere with daily activity and sleeping.   Exacerbating factors: Sitting, Standing, Bending, and Flexing.    Mitigating factors medications.     Interval  History 9/26/2024:     Irvin Murilloder  is a 49 y.o. male presents to the clinic for follow up.  Since last visit the pain has is unchanged.    The pain is located in the left hip area and radiates to the left knee and ankles .  The pain is described as aching, shooting, and stabbing    At BEST  8/10   At WORST  10/10 on the WORST day.    On average pain is rated as 8/10.   Today the pain is rated as 8/10  Symptoms interfere with daily activity and sleeping.   Exacerbating factors: Sitting, Standing, Bending, and Flexing.    Mitigating factors nothing.     Interval History 6/20/2024:    is a 48 y.o. male presents to the clinic for follow up.  Since last visit the pain has is unchanged.    The pain is located in the left Hip area and radiates to the left knee and ankles.  The pain is described as aching, shooting, and stabbing    At BEST  8/10   At WORST  10/10 on the WORST day.    On average pain is rated as 8/10.   Today the pain is rated as 8/10  Symptoms interfere with daily activity and sleeping.   Exacerbating factors: Sitting, Standing, Bending, and Flexing.    Mitigating factors nothing.     Interval History 3/18/2024:   Irvin Murilloder  is a 49 y.o. male presents to the clinic for follow up.  Since last visit the pain has has worsened. He has been having to travel to [place] a lot recently due to his son-in-law getting in to a MVA and helping out. His right hip has been bothering him more recently    The pain is located in the groin area and radiates to the right hip, left knee and ankles .  The pain is described as aching    At BEST  8/10   At WORST  10/10 on the WORST day.    On average pain is rated as 8/10.   Today the pain is rated as 8/10  Symptoms interfere with daily activity and sleeping.   Exacerbating factors: Sitting, Standing, Bending, and Flexing.    Mitigating factors medications.     Interval History 9/13/2023:   Irvin Murilloder  is a 49 y.o. male presents to the clinic for  follow up.  Since last visit the pain has has worsened.    The pain is located in the groin, left knee, and both ankles area and radiates to the knee and ankle .  The pain is described as aching    At BEST  8/10   At WORST  10/10 on the WORST day.    On average pain is rated as 8/10.   Today the pain is rated as 8/10  Symptoms interfere with daily activity and sleeping.   Exacerbating factors: Sitting, Standing, Bending, Walking, and Flexing.    Mitigating factors medications.     Interval History 6/21/2023:   Irvin Diaz Jr. is a 49 y.o. male presents to the clinic for follow up.  Since last visit the pain has has improved in some ways and worsened in others. The right hip has been bothering him more now. States that his right leg is clicking.     The pain is located in the left hip area and radiates to the right hip and down to his left knee .  The pain is described as aching and throbbing    At BEST  7/10   At WORST  9/10 on the WORST day.    On average pain is rated as 8/10.   Today the pain is rated as 8/10  Symptoms interfere with daily activity.   Exacerbating factors: Bending.    Mitigating factors medications (tramadol not oxycodone).     Interval History 1/25/2023:     Irvin Diaz Jr. is a 49 y.o. male presents to the clinic for follow up.  Since last visit the pain has has moderately improved. Reports that he went on a cruise and noticed his pain was doing better.  He has stopped using the cane because it has been better.  He went back to tramadol because he did not think he required the oxycodone. Pain 8/10 today. Feels like a tolerable 8.    Interval History 10/31/2022:     Irvin Diaz Jr. is a 49 y.o. male presents to the clinic for follow up.  Since last visit the pain has has significantly worsened.    The pain is located in the L hip area and radiates to the L foot .  The pain is described as aching, dull, shooting, stabbing, tight band, and tingling    At BEST  8/10   At WORST   10/10 on the WORST day.    On average pain is rated as 7/10.   Today the pain is rated as 8/10  Symptoms interfere with daily activity, sleeping, and work.   Exacerbating factors: Standing, Bending, Coughing/Sneezing, Walking, and Getting out of bed/chair.    Mitigating factors nothing and medications.     Interval History 9/7/2022:     Irvin Diaz Jr. is a 49 y.o. male presents to the clinic for follow up.  Since last visit the pain has has worsened. He is now using a walker full time for the last 2 weeks. In a wheelchair today. He is unable to describe if his limited mobility is due to just pain or maybe a component of weakness.     The pain is located in the L hip area and radiates to the L foot .  The pain is described as aching, shooting, and stabbing    At BEST  7/10   At WORST  10/10 on the WORST day.    On average pain is rated as 8/10.   Today the pain is rated as 10/10  Symptoms interfere with daily activity, sleeping, and work.   Exacerbating factors: Standing, Laying, Bending, Walking, Lifting, and Getting out of bed/chair.    Mitigating factors none.     Interval History 8/2/2022:     Irvin Diaz Jr. is a 49 y.o. male presents to the clinic for follow up.  Since last visit the pain has is unchanged.  He stopped the Oxycodone because it gave severe, lucid dreams.  Ran out of tramadol and has not been taking anything for his pain.    The pain is located in the left hip area and radiates to the left knee/ groin .  The pain is described as aching, shooting and stabbing    At BEST  8/10   At WORST  10/10 on the WORST day.    On average pain is rated as 8/10.   Today the pain is rated as 8/10  Symptoms interfere with daily activity, sleeping and work.   Exacerbating factors: Standing, Walking and Getting out of bed/chair.    Mitigating factors nothing, heat, ice, medications and rest.     Interval History 7/5/2022:     Irvin Diaz Jr. is a 49 y.o. male presents to the clinic for follow  up.  Since last visit the pain has is unchanged.  He is s/p hip block that was complicated by hematoma in the pectineus muscle.  His Hgb has dropped, although the hematoma is resolving.  Recommended that patient go to ED as recommended by transplant.    The pain is located in the left hip area and radiates to the left leg/ groin  .  The pain is described as aching, shooting and stabbing    At BEST  8/10   At WORST  10/10 on the WORST day.    On average pain is rated as 8/10.   Today the pain is rated as 8/10  Symptoms interfere with daily activity, sleeping and work.   Exacerbating factors: Standing, Walking and Getting out of bed/chair.    Mitigating factors nothing, heat, ice, medications and rest.     Interval History 5/30/2022:     Irvin Diaz Jr. is a 49 y.o. male presents to the clinic for follow up.  Since last visit the pain has is unchanged. He missed his original procedure date due to not checking his voicemail and transportation issues. He is still interested in the procedure.  WE had a long talk about bleeding risk with his low platelets and high INR.    The pain is located in the left hip area and radiates to the left leg/groin .  The pain is described as aching, shooting and stabbing    At BEST  8/10   At WORST  10/10 on the WORST day.    On average pain is rated as 8/10.   Today the pain is rated as 8/10  Symptoms interfere with daily activity, sleeping and work.   Exacerbating factors: walking.    Mitigating factors nothing.     Initial Hx:  Irvin Diaz Jr. is a 49 y.o. male presents to the clinic for the evaluation of left hip pain. The pain started 3 years ago following fall and symptoms have been worsening. The patient states used to get hip injections of the left hip.  He is unable to walk without a cane.  He states that he was told after imaging that he had a fracture in 2019.  He had an injection (and aspiration) in December/january and it helps the pain for about 3 weeks.  After  the injections he still needs the pain but it helps.  The ortho physician at [facility]    CT note about the hip:  There is collapse of the left femoral head superior portion with bone-on-bone as well as underlying femoral sclerotic changes suggesting AVN with severe secondary degenerative changes of the acetabulum and the left hip effusion stable since prior exam.     The patient says that he has seen a ortho surgeon in the past and that they are unable to do surgery due to his liver failure.    Pain Description:    The pain is located in the left hip area and radiates to the left leg/ groin area .    At BEST  8/10   At WORST  10/10 on the WORST day.    On average pain is rated as 8/10.   Today the pain is rated as 8/10  The pain is continuous.  The pain is described as aching, shooting and throbbing    Symptoms interfere with daily activity, sleeping and work.   Exacerbating factors: Standing, Laying, Bending, Walking, Night Time, Morning, Flexing, Lifting and Getting out of bed/chair.    Mitigating factors laying down and medications.   He reports 5 hours of sleep per night.    Physical Therapy/Home Exercise: No, not currently in physical therapy or home exercise program    Current Pain Medications:    - none    Failed Pain Medications:    - cannot take NSAIDs due to gastric bleeding, cannot take tylenol due to liver failure.     Pain Treatment Therapies:    Pain procedures: hip injections  Physical Therapy: physical therapy made things worse  Chiropractor: none  Acupuncture: none  TENS unit: none  Spinal decompression: none  Joint replacement: none    Patient denies urinary incontinence, bowel incontinence and loss of sensations. (+) weakness in the left leg  Patient denies any suicidal or homicidal ideations     report:  Reviewed and consistent with medication use as prescribed.    Imaging:   XR abdomen 03/2022:  Please note the hemidiaphragms are not included in their entirety.  Multiple  surgical clips and presumed anastomotic suture line project over the right abdomen.  There are a few mildly prominent loops of gas-filled small bowel loops present measuring up to 3.3 cm in the left abdomen.  Limited evaluation of free intraperitoneal air to patient positioning/technique.  Calcifications project over the pelvis, likely phleboliths.  Osseous structures demonstrate significant degenerative change of the left hip with chronic appearing deformity/collapse of the left femoral head.    CT abdomen 03/2022:  Mild circumferential wall thickening involving the proximal colon extending from the ileocolic anastomosis at the hepatic flexure through around the level of the splenic flexure suggests inflammatory or infectious colitis.  No convincing transmural inflammation is seen.     Postoperative changes of proximal colectomy and scattered mild diverticulosis of the more distal colon.  No convincing diverticulitis, bowel obstruction, free air or abscess.     Findings of cirrhosis and mild abdominal ascites as described essentially stable.     Left hip findings suggesting AVN as described.     This report was flagged in Epic as abnormal.     No other significant or acute findings.       Past Medical History:   Diagnosis Date    Alcohol withdrawal 5/1/2022    Alcoholic cirrhosis of liver     Alcoholic ketoacidosis 6/6/2021    Arthritis     ATN (acute tubular necrosis)     CHF (congestive heart failure)     Diverticulitis 01/2020    Esophageal varices     GERD (gastroesophageal reflux disease)     GI bleed     Hip arthritis     Left    Hypertension     Liver cirrhosis     Macrocytic anemia     Pulmonary embolism 08/2018    Unprovoked DVT.  Stop Coumadin due to GIB    Thrombocytopenia      Past Surgical History:   Procedure Laterality Date    ANKLE SURGERY      BLOCK, NERVE, PERIPHERAL Left 06/24/2022    Procedure: Left Hip femoral-obturator accessory nerve block;  Surgeon: Robbin De La Garza DO;  Location: [facility]  OR;  Service: Pain Management;  Laterality: Left;    COLON SURGERY  2007    COLONOSCOPY  09/05/2019    [facility]    COLONOSCOPY N/A 02/17/2021    Procedure: COLONOSCOPY;  Surgeon: Renea Billingsley MD;  Location: [facility];  Service: Endoscopy;  Laterality: N/A;    COLONOSCOPY N/A 02/13/2023    Procedure: COLONOSCOPY;  Surgeon: Rudy Orellana MD;  [facility]  Service: Endoscopy;  Laterality: N/A;  cirrhosis-labs done on 1/11/23  instr portal-GT  No answer for precall- [place]    COLONOSCOPY N/A 03/10/2023    Procedure: COLONOSCOPY;  Surgeon: Rudy Orellana MD;  Location: [facility] ([facility]);  Service: Endoscopy;  Laterality: N/A;  inst via poral per pt request    COLONOSCOPY W/ BIOPSIES  02/17/2021    ECHOCARDIOGRAM,TRANSESOPHAGEAL N/A 02/10/2025    Procedure: Transesophageal echo (ALLAN) intra-procedure log documentation;  Surgeon: Yunior Abreu MD;  Location: [facility] CATH/EP LAB;  Service: Cardiology;  Laterality: N/A;    ESOPHAGOGASTRODUODENOSCOPY N/A 07/26/2019    Procedure: EGD (ESOPHAGOGASTRODUODENOSCOPY);  Surgeon: Brian Trivedi MD;  Location: [facility];  Service: Endoscopy;  Laterality: N/A;    ESOPHAGOGASTRODUODENOSCOPY N/A 04/08/2020    Procedure: EGD (ESOPHAGOGASTRODUODENOSCOPY);  Surgeon: Donn Acuna MD;  Location: [facility];  Service: Endoscopy;  Laterality: N/A;    ESOPHAGOGASTRODUODENOSCOPY N/A 01/03/2021    Procedure: EGD (ESOPHAGOGASTRODUODENOSCOPY)- coffee ground emesis, hx varices;  Surgeon: Steve Chairez MD;  Location: [facility];  Service: Endoscopy;  Laterality: N/A;    ESOPHAGOGASTRODUODENOSCOPY N/A 05/03/2021    Procedure: EGD (ESOPHAGOGASTRODUODENOSCOPY);  Surgeon: Jeanmarie Ngo MD;  Location: [facility];  Service: Endoscopy;  Laterality: N/A;    ESOPHAGOGASTRODUODENOSCOPY N/A 07/19/2021    Procedure: ESOPHAGOGASTRODUODENOSCOPY (EGD);  Surgeon: Claudio Medeiros MD;  [facility];  Service: Endoscopy;  Laterality: N/A;    ESOPHAGOGASTRODUODENOSCOPY  12/20/2021     ESOPHAGOGASTRODUODENOSCOPY N/A 2021    Procedure: EGD (ESOPHAGOGASTRODUODENOSCOPY);  Surgeon: Ajay Jackson MD;  Location: [facility];  Service: Endoscopy;  Laterality: N/A;    ESOPHAGOGASTRODUODENOSCOPY N/A 2022    Procedure: EGD (ESOPHAGOGASTRODUODENOSCOPY);  Surgeon: Brian Trivedi MD;  Location: [facility];  Service: Endoscopy;  Laterality: N/A;    ESOPHAGOGASTRODUODENOSCOPY N/A 2022    Procedure: EGD (ESOPHAGOGASTRODUODENOSCOPY);  Surgeon: Ajay Jackson MD;  Location: [facility];  Service: Endoscopy;  Laterality: N/A;    ESOPHAGOGASTRODUODENOSCOPY N/A 2022    Procedure: EGD (ESOPHAGOGASTRODUODENOSCOPY);  Surgeon: Edouard Maynard MD;  Location: [address]);  Service: Endoscopy;  Laterality: N/A;    ESOPHAGOGASTRODUODENOSCOPY N/A 2023    Procedure: EGD (ESOPHAGOGASTRODUODENOSCOPY);  Surgeon: Caesar Dickens MD;  Location: [facility];  Service: Endoscopy;  Laterality: N/A;    ESOPHAGOGASTRODUODENOSCOPY N/A 2024    Procedure: EGD (ESOPHAGOGASTRODUODENOSCOPY);  Surgeon: Jeanmarie Ngo MD;  Location: [facility];  Service: Endoscopy;  Laterality: N/A;    FRACTURE SURGERY      HERNIA REPAIR Left     Inguinal    INJECTION OF JOINT Right 2023    Procedure: RT Hip Injection;  Surgeon: Robbin De La Garza DO;  Location: [facility];  Service: Pain Management;  Laterality: Right;  oral - 20 mins    INJECTION OF JOINT Right 10/08/2024    Procedure: Right hip injection;  Surgeon: Robbin De La Garza DO;  Location: [facility];  Service: Pain Management;  Laterality: Right;  No sed no AC    UPPER GASTROINTESTINAL ENDOSCOPY N/A 2022     Social History     Socioeconomic History    Marital status:    Tobacco Use    Smoking status: Former     Current packs/day: 0.00     Types: Cigarettes     Quit date:      Years since quittin.2    Smokeless tobacco: Never    Tobacco comments:     quit 20 years ago   Substance and  Sexual Activity    Alcohol use: Not Currently     Comment: Quit drinking 9/12/22    Drug use: Not Currently    Sexual activity: Yes     Comment: occ     Social Drivers of Health     Financial Resource Strain: Low Risk  (2/7/2025)    Overall Financial Resource Strain (CARDIA)     Difficulty of Paying Living Expenses: Not hard at all   Food Insecurity: No Food Insecurity (2/7/2025)    Hunger Vital Sign     Worried About Running Out of Food in the Last Year: Never true     Ran Out of Food in the Last Year: Never true   Transportation Needs: No Transportation Needs (2/7/2025)    TRANSPORTATION NEEDS     Transportation : No   Physical Activity: Unknown (4/22/2024)    Exercise Vital Sign     Days of Exercise per Week: 0 days   Recent Concern: Physical Activity - Inactive (4/22/2024)    Exercise Vital Sign     Days of Exercise per Week: 0 days     Minutes of Exercise per Session: 0 min   Stress: No Stress Concern Present (2/7/2025)    [employer] [place] [employer] - Occupational Stress Questionnaire     Feeling of Stress : Not at all   Housing Stability: Unknown (2/7/2025)    Housing Stability Vital Sign     Unable to Pay for Housing in the Last Year: No     Homeless in the Last Year: No     Family History   Problem Relation Name Age of Onset    Hypertension Mother pat ncgragapito     Breast cancer Mother pat ncflavia     Hypertension Father Irvin Estradaruder Sr     Prostate cancer Father Irvin Estradaruder Sr     Bladder Cancer Father Irvin Murilloder Sr        Review of patient's allergies indicates:   Allergen Reactions    Ciprofloxacin hcl Hallucinations    Meperidine Hives     Pt medicated w/multiple medications (demerol, protonix, and zofran)  w/i 10 mins time frame prior to developing localized hives near IV site that med was administered. Pt reports he has/takes all other medications on daily basis.     Morphine Itching    Nsaids (non-steroidal anti-inflammatory drug)      Kidney Disease    Tylenol  [acetaminophen]      Hx of liver disease       Current Outpatient Medications   Medication Sig    ferrous gluconate (FERATE) 240 (27 FE) MG tablet Take 2 tablets (480 mg total) by mouth 2 (two) times daily with meals.    folic acid (FOLVITE) 1 MG tablet Take 1 tablet (1 mg total) by mouth once daily.    metoprolol tartrate (LOPRESSOR) 50 MG tablet Take 1 tablet (50 mg total) by mouth 2 (two) times daily. Do not take if heart rate is less than 60    nortriptyline (PAMELOR) 25 MG capsule Take 1 capsule (25 mg total) by mouth every evening.    papaverine 30 mg/mL injection Add phentolamine 10 mgs/ml., add PGE1  100 micrograms.    rifAXIMin (XIFAXAN) 550 mg Tab Take 1 tablet (550 mg total) by mouth 2 (two) times daily.    spironolactone (ALDACTONE) 25 MG tablet Take 1 tablet (25 mg total) by mouth once daily.    tadalafiL (CIALIS) 20 MG Tab Take 1 tablet (20 mg total) by mouth daily as needed (erectile dysfunction).    tamsulosin (FLOMAX) 0.4 mg Cap Take 1 capsule (0.4 mg total) by mouth once daily.    thiamine 100 MG tablet Take 1 tablet (100 mg total) by mouth once daily.    traMADoL (ULTRAM) 50 mg tablet Take 1 tablet (50 mg total) by mouth 4 (four) times daily as needed for Pain.     Current Facility-Administered Medications   Medication    hylan g-f 20 (SYNVISC ONE) 48 mg/6 mL injection 48 mg       REVIEW OF SYSTEMS:    GENERAL:  No weight loss, malaise or fevers.   HEENT:   No recent changes in vision or hearing   NECK:  Negative for lumps, no difficulty with swallowing.  RESPIRATORY:  Negative for cough, wheezing or shortness of breath, patient denies any recent URI.  CARDIOVASCULAR:  Negative for chest pain, leg swelling or palpitations.  GI:  Negative for abdominal discomfort, blood in stools or black stools or change in bowel habits.  MUSCULOSKELETAL:  See HPI.  SKIN:  Negative for lesions, rash, and itching. + skin itching  PSYCH:  No mood disorder or recent psychosocial stressors.  Patients sleep is not  disturbed secondary to pain.  HEMATOLOGY/LYMPHOLOGY:  Negative for prolonged bleeding, bruising easily or swollen nodes.  Patient is not currently taking any anti-coagulants  NEURO:   No history of headaches, syncope, paralysis, seizures or tremors.  All other reviewed and negative other than HPI.    OBJECTIVE:    /79 (BP Location: Right arm, Patient Position: Sitting)   Pulse 85     PHYSICAL EXAMINATION:    GENERAL: Fraile, in no acute distress, alert and oriented x3.  PSYCH:  Mood and affect appropriate.  SKIN: Skin color, texture, turgor normal, no rashes or lesions on visible skin.  HEAD/FACE:  Normocephalic, atraumatic. Cranial nerves grossly intact.  CV: RRR with palpation of the radial artery.  PULM: CTAB. No evidence of respiratory difficulty, symmetric chest rise.  GI:  Soft    MUSKULOSKELETAL:    EXTREMITIES:     Left wrist  (-) edema  (-) pain with wrist flexion and extension  (-) TTP at the ulnar/wrist junction    Left Hip Exam (limited ROM and exam 2/2/ pain)  - Log Roll Did not perform  - FADIR Did not perform  - Stinchfield Did not perform  - Hip Scour Did not perform  - GTB Tenderness Positive   -TTP over anterior hip joint. Posterior not palpated  - TTP of the superior knee joint.    Right hip exam:  (+) log roll  (+) fadir  (+) TTP of the right groin  (+) Right leg 2+ pitting edema    Left knee:  TTP right medial knee  (-) edema  (-) crepitus  (+) pain with end ROM    MUSCULOSKELETAL:  Atrophy of the left leg compared to the right  No deformities, edema, or skin discoloration are noted on visible skin. Good capillary refill.     NEURO: Bilateral upper and lower extremity coordination and muscle stretch reflexes are physiologic and symmetric.      NEUROLOGICAL EXAM:  MENTAL STATUS: A x O x 3, good concentration, speech is fluent and goal directed  MEMORY: recent and remote are intact  CN: CN2-12 grossly intact  MOTOR: 5/5 in all muscle groups on the right. HF 3/5 on the left, 4/5 KE, 4/5 KF  with pain  DTRs: 3+ intact symmetric patella and 2 + achilles  Sensation:    -yes Loss of sensation in a left lower L-1 and L-2 on the left distribution.  Babinski: absent     Gait: Cane, abnormal. Short stride. Favors left leg

## 2025-04-11 ENCOUNTER — RESULTS FOLLOW-UP (OUTPATIENT)
Dept: UROLOGY | Facility: CLINIC | Age: 50
End: 2025-04-11

## 2025-04-11 LAB — BACTERIA UR CULT: NORMAL

## 2025-04-11 RX ORDER — NITROFURANTOIN 25; 75 MG/1; MG/1
100 CAPSULE ORAL 2 TIMES DAILY
Qty: 14 CAPSULE | Refills: 0 | Status: SHIPPED | OUTPATIENT
Start: 2025-04-11 | End: 2025-04-18

## 2025-04-17 ENCOUNTER — TELEPHONE (OUTPATIENT)
Dept: UROLOGY | Facility: CLINIC | Age: 50
End: 2025-04-17
Payer: MEDICARE

## 2025-04-17 NOTE — TELEPHONE ENCOUNTER
Called patient and he said that the urine gets dark then light. No pain or problems, Told patient that if he gets any pains or urine stays dark to go to ER. Patient states verbal understanding.

## 2025-04-17 NOTE — TELEPHONE ENCOUNTER
----- Message from Ada sent at 4/17/2025  8:04 AM CDT -----  Regarding: Patient concerns  Contact: pt [phone]  Type:  Needs Medical AdviceWho Called: Irvin called in regards to still seeing blood and pt is concern pt has an appt with doctor scheduled on 5/6 Symptoms (please be specific): seeing blood  How long has patient had these symptoms: procedure Wed 4/9Would the patient rather a call back or a response via [facility]? Call back Best Call Back Number: pt [phone] Additional Information:

## 2025-04-21 ENCOUNTER — TELEPHONE (OUTPATIENT)
Dept: PAIN MEDICINE | Facility: CLINIC | Age: 50
End: 2025-04-21
Payer: MEDICARE

## 2025-04-22 ENCOUNTER — TELEPHONE (OUTPATIENT)
Dept: HEMATOLOGY/ONCOLOGY | Facility: CLINIC | Age: 50
End: 2025-04-22
Payer: MEDICARE

## 2025-04-28 ENCOUNTER — HOSPITAL ENCOUNTER (OUTPATIENT)
Dept: RADIOLOGY | Facility: HOSPITAL | Age: 50
Discharge: HOME | End: 2025-04-28
Attending: INTERNAL MEDICINE
Payer: MEDICARE

## 2025-04-28 ENCOUNTER — PATIENT MESSAGE (OUTPATIENT)
Dept: PRIMARY CARE CLINIC | Facility: CLINIC | Age: 50
End: 2025-04-28
Payer: MEDICARE

## 2025-04-28 DIAGNOSIS — K70.31 ALCOHOLIC CIRRHOSIS OF LIVER WITH ASCITES: Chronic | ICD-10-CM

## 2025-04-28 PROCEDURE — 76705 ECHO EXAM OF ABDOMEN: CPT | Mod: TC,PO

## 2025-04-28 PROCEDURE — 76705 ECHO EXAM OF ABDOMEN: CPT | Mod: 26,,, | Performed by: RADIOLOGY

## 2025-04-29 DIAGNOSIS — G93.40 ENCEPHALOPATHY: ICD-10-CM

## 2025-04-29 DIAGNOSIS — G89.4 CHRONIC PAIN SYNDROME: ICD-10-CM

## 2025-04-29 DIAGNOSIS — D69.6 THROMBOCYTOPENIA: ICD-10-CM

## 2025-04-29 DIAGNOSIS — F11.90 CHRONIC, CONTINUOUS USE OF OPIOIDS: ICD-10-CM

## 2025-04-29 DIAGNOSIS — D50.0 IRON DEFICIENCY ANEMIA DUE TO CHRONIC BLOOD LOSS: ICD-10-CM

## 2025-04-29 DIAGNOSIS — I10 BENIGN ESSENTIAL HTN: ICD-10-CM

## 2025-04-29 DIAGNOSIS — R79.1 ELEVATED CLOTTING TIME: ICD-10-CM

## 2025-04-29 DIAGNOSIS — N41.0 PROSTATITIS, ACUTE: ICD-10-CM

## 2025-04-29 DIAGNOSIS — R78.81 BACTEREMIA DUE TO COAGULASE-NEGATIVE STAPHYLOCOCCUS: ICD-10-CM

## 2025-04-29 DIAGNOSIS — M87.052 AVASCULAR NECROSIS OF HIP, LEFT: ICD-10-CM

## 2025-04-29 DIAGNOSIS — K70.30 ALCOHOLIC CIRRHOSIS OF LIVER WITHOUT ASCITES: ICD-10-CM

## 2025-04-29 DIAGNOSIS — B95.7 BACTEREMIA DUE TO COAGULASE-NEGATIVE STAPHYLOCOCCUS: ICD-10-CM

## 2025-04-29 RX ORDER — TADALAFIL 20 MG/1
20 TABLET ORAL DAILY PRN
Qty: 15 TABLET | Refills: 11 | Status: SHIPPED | OUTPATIENT
Start: 2025-04-29 | End: 2026-04-29

## 2025-04-29 RX ORDER — FOLIC ACID 1 MG/1
1 TABLET ORAL DAILY
Qty: 30 TABLET | Refills: 5 | Status: SHIPPED | OUTPATIENT
Start: 2025-04-29

## 2025-04-29 RX ORDER — METOPROLOL TARTRATE 50 MG/1
50 TABLET ORAL 2 TIMES DAILY
Qty: 60 TABLET | Refills: 11 | Status: SHIPPED | OUTPATIENT
Start: 2025-04-29 | End: 2026-04-29

## 2025-04-29 RX ORDER — LANOLIN ALCOHOL/MO/W.PET/CERES
100 CREAM (GRAM) TOPICAL DAILY
Qty: 30 TABLET | Refills: 5 | Status: SHIPPED | OUTPATIENT
Start: 2025-04-29

## 2025-04-29 RX ORDER — TAMSULOSIN HYDROCHLORIDE 0.4 MG/1
0.4 CAPSULE ORAL DAILY
Qty: 30 CAPSULE | Refills: 11 | Status: SHIPPED | OUTPATIENT
Start: 2025-04-29 | End: 2025-05-29

## 2025-04-29 RX ORDER — QUINIDINE GLUCONATE 324 MG
480 TABLET, EXTENDED RELEASE ORAL 2 TIMES DAILY WITH MEALS
Qty: 120 TABLET | Refills: 3 | Status: SHIPPED | OUTPATIENT
Start: 2025-04-29

## 2025-04-29 RX ORDER — SPIRONOLACTONE 25 MG/1
25 TABLET ORAL DAILY
Qty: 90 TABLET | Refills: 3 | Status: SHIPPED | OUTPATIENT
Start: 2025-04-29 | End: 2026-04-29

## 2025-04-29 NOTE — TELEPHONE ENCOUNTER
Refills pended   ----- Message from Tan sent at 4/29/2025  2:28 PM CDT -----  Regarding: Change of Pharmacy  Contact: Pt [phone]  .[email or web address] Patient is calling for Medical Advice regarding: Patient needs a call from the office to discuss medications. He says he needs all of them refilled at a new pharmacy and he wanted to make sure it was being sent to the new pharmacy, not the old one. His new pharmacy is listed below. Please call patient at number provided to confirm.How long has patient had these symptoms:Pharmacy name and phone#:[id number] - [address] & [address] [address]Clmyt: [phone] Fax: [phone] Patient wants a call back or thru [facility]: CallComments:

## 2025-04-29 NOTE — TELEPHONE ENCOUNTER
No care due was identified.  [facility] Embedded Care Due Messages. Reference number: [id number].   4/29/2025 3:14:29 PM CDT

## 2025-04-30 ENCOUNTER — PATIENT MESSAGE (OUTPATIENT)
Dept: PAIN MEDICINE | Facility: CLINIC | Age: 50
End: 2025-04-30
Payer: MEDICARE

## 2025-04-30 ENCOUNTER — TELEPHONE (OUTPATIENT)
Dept: UROLOGY | Facility: CLINIC | Age: 50
End: 2025-04-30
Payer: MEDICARE

## 2025-04-30 ENCOUNTER — OFFICE VISIT (OUTPATIENT)
Dept: HEMATOLOGY/ONCOLOGY | Facility: CLINIC | Age: 50
End: 2025-04-30
Payer: MEDICARE

## 2025-04-30 DIAGNOSIS — K70.30 ALCOHOLIC CIRRHOSIS OF LIVER WITHOUT ASCITES: Primary | ICD-10-CM

## 2025-04-30 DIAGNOSIS — D53.9 NUTRITIONAL ANEMIA: ICD-10-CM

## 2025-04-30 RX ORDER — TRAMADOL HYDROCHLORIDE 50 MG/1
50 TABLET ORAL 4 TIMES DAILY PRN
Qty: 120 EACH | Refills: 2 | Status: SHIPPED | OUTPATIENT
Start: 2025-04-30 | End: 2025-07-29

## 2025-04-30 NOTE — TELEPHONE ENCOUNTER
----- Message from Claudio sent at 4/30/2025  1:12 PM CDT -----  Regarding: Consult/Advisory  Contact: [phone]  Consult/Advisory Name Of Caller: Irvin   Contact Preference:  [phone]  Nature of call: Calling to see if there is an arrival time yet for his sx.

## 2025-04-30 NOTE — TELEPHONE ENCOUNTER
Spoke to patient. He is requiring the tramadol again to function.  Will go ahead and refill.     Robbin Abernathy

## 2025-04-30 NOTE — TELEPHONE ENCOUNTER
Spoke with patient.  Informed patient that surgery is scheduled May 6.  Informed patient about arrival time.  Patient verbalized understanding.    GOGO Castillo

## 2025-04-30 NOTE — PROGRESS NOTES
The patient location is: [place]  Visit type: Virtual visit with synchronous audio and video  Face-to-face or time spent with patient on the encounter: 25 min  Total time spent on and for  this encounter which includes non face-to-face time preparing to see patient, review of tests, obtaining and or reviewing separately obtained records documenting clinical information in the electronic or other health records, independently interpreting results which is not separately reported ,and communicating results to the patient/family/caregiver and in care coordination and treatment planning/communicating with pharmacy for prescriptions/addressing social needs/arranging follow-up and or referrals : 25 min     Each patient I provide medical services by telemedicine is:  (1) informed of the relationship between the physician and patient and the respective role of any other health care provider with respect to management of the patient; and (2) notified that he or she may decline to receive medical services by telemedicine and may withdraw from such care at any time.  This is a video visit therefore some elements of the physical exam such as vital signs, heart sounds are breath sounds are not included and may be included if found in recent clinic notes of other providers assessing same patient. Any symptoms or signs that were visualized were stated by the patient may be included in this note.      Service Date:  4/30/25    Chief Complaint: thrombocytopenia and anemia    Irvin Diaz Jr. is a 49 y.o. male here with thrombocytopenia and anemia. Patient does have a cirrhosis from hx of ETOH abuse.  States he has not had alcohol in over a year now.  Doing well.  No complaints to me.      Review of Systems   Constitutional: Negative.  Negative for appetite change and unexpected weight change.   HENT: Negative.  Negative for mouth sores.    Eyes: Negative.  Negative for visual disturbance.   Respiratory:  Positive for  shortness of breath. Negative for cough.    Cardiovascular: Negative.  Negative for chest pain.   Gastrointestinal: Negative.  Negative for abdominal pain and diarrhea.   Endocrine: Negative.    Genitourinary:  Positive for frequency.   Musculoskeletal: Negative.  Negative for back pain.   Integumentary:  Positive for rash.   Neurological: Negative.  Negative for headaches.   Hematological: Negative.  Negative for adenopathy.   Psychiatric/Behavioral: Negative.  The patient is not nervous/anxious.         Current Outpatient Medications   Medication Instructions    ferrous gluconate (FERATE) 480 mg, Oral, 2 times daily with meals    folic acid (FOLVITE) 1 mg, Oral, Daily    metoprolol tartrate (LOPRESSOR) 50 mg, Oral, 2 times daily, Do not take if heart rate is less than 60    nortriptyline (PAMELOR) 25 mg, Oral, Nightly    papaverine 30 mg/mL injection Add phentolamine 10 mgs/ml., add PGE1  100 micrograms.    spironolactone (ALDACTONE) 25 mg, Oral, Daily    tadalafiL (CIALIS) 20 mg, Oral, Daily PRN    tamsulosin (FLOMAX) 0.4 mg, Oral, Daily    thiamine 100 mg, Oral, Daily    traMADoL (ULTRAM) 50 mg, Oral, 4 times daily PRN    XIFAXAN 550 mg, Oral, 2 times daily        Past Medical History:   Diagnosis Date    Alcohol withdrawal 5/1/2022    Alcoholic cirrhosis of liver     Alcoholic ketoacidosis 6/6/2021    Arthritis     ATN (acute tubular necrosis)     CHF (congestive heart failure)     Diverticulitis 01/2020    Esophageal varices     GERD (gastroesophageal reflux disease)     GI bleed     Hip arthritis     Left    Hypertension     Liver cirrhosis     Macrocytic anemia     Pulmonary embolism 08/2018    Unprovoked DVT.  Stop Coumadin due to GIB    Thrombocytopenia         Past Surgical History:   Procedure Laterality Date    ANKLE SURGERY      BLOCK, NERVE, PERIPHERAL Left 06/24/2022    Procedure: Left Hip femoral-obturator accessory nerve block;  Surgeon: Robbin De La Garza DO;  Location: [facility] OR;  Service: Pain  Management;  Laterality: Left;    COLON SURGERY  2007    COLONOSCOPY  09/05/2019    [facility]    COLONOSCOPY N/A 02/17/2021    Procedure: COLONOSCOPY;  Surgeon: Renea Billingsley MD;  Location: [facility];  Service: Endoscopy;  Laterality: N/A;    COLONOSCOPY N/A 02/13/2023    Procedure: COLONOSCOPY;  Surgeon: Rudy Orellana MD;  Location: [facility] (4TH FLR);  Service: Endoscopy;  Laterality: N/A;  cirrhosis-labs done on 1/11/23  instr portal-GT  No answer for precall- [place]    COLONOSCOPY N/A 03/10/2023    Procedure: COLONOSCOPY;  Surgeon: Rudy Orellana MD;  Location: [facility] (4TH FLR);  Service: Endoscopy;  Laterality: N/A;  inst via poral per pt request    COLONOSCOPY W/ BIOPSIES  02/17/2021    ECHOCARDIOGRAM,TRANSESOPHAGEAL N/A 02/10/2025    Procedure: Transesophageal echo (ALLAN) intra-procedure log documentation;  Surgeon: Yunior Abreu MD;  Location: [facility] CATH/EP LAB;  Service: Cardiology;  Laterality: N/A;    ESOPHAGOGASTRODUODENOSCOPY N/A 07/26/2019    Procedure: EGD (ESOPHAGOGASTRODUODENOSCOPY);  Surgeon: Brian Trivedi MD;  Location: [facility];  Service: Endoscopy;  Laterality: N/A;    ESOPHAGOGASTRODUODENOSCOPY N/A 04/08/2020    Procedure: EGD (ESOPHAGOGASTRODUODENOSCOPY);  Surgeon: Donn Acuna MD;  Location: [facility];  Service: Endoscopy;  Laterality: N/A;    ESOPHAGOGASTRODUODENOSCOPY N/A 01/03/2021    Procedure: EGD (ESOPHAGOGASTRODUODENOSCOPY)- coffee ground emesis, hx varices;  Surgeon: Steve Chairez MD;  Location: [facility];  Service: Endoscopy;  Laterality: N/A;    ESOPHAGOGASTRODUODENOSCOPY N/A 05/03/2021    Procedure: EGD (ESOPHAGOGASTRODUODENOSCOPY);  Surgeon: Jeanmarie Ngo MD;  Location: [facility];  Service: Endoscopy;  Laterality: N/A;    ESOPHAGOGASTRODUODENOSCOPY N/A 07/19/2021    Procedure: ESOPHAGOGASTRODUODENOSCOPY (EGD);  Surgeon: Claudio Medeiros MD;  Location: [facility];  Service: Endoscopy;  Laterality: N/A;    ESOPHAGOGASTRODUODENOSCOPY  12/20/2021     ESOPHAGOGASTRODUODENOSCOPY N/A 12/20/2021    Procedure: EGD (ESOPHAGOGASTRODUODENOSCOPY);  Surgeon: Ajay Jackson MD;  Location: [facility];  Service: Endoscopy;  Laterality: N/A;    ESOPHAGOGASTRODUODENOSCOPY N/A 05/03/2022    Procedure: EGD (ESOPHAGOGASTRODUODENOSCOPY);  Surgeon: Brian Trivedi MD;  Location: [facility];  Service: Endoscopy;  Laterality: N/A;    ESOPHAGOGASTRODUODENOSCOPY N/A 07/07/2022    Procedure: EGD (ESOPHAGOGASTRODUODENOSCOPY);  Surgeon: Ajay Jackson MD;  Location: [facility];  Service: Endoscopy;  Laterality: N/A;    ESOPHAGOGASTRODUODENOSCOPY N/A 11/29/2022    Procedure: EGD (ESOPHAGOGASTRODUODENOSCOPY);  Surgeon: Edouard Maynard MD;  Location: [facility] ([address] [place] [address]);  Service: Endoscopy;  Laterality: N/A;    ESOPHAGOGASTRODUODENOSCOPY N/A 04/28/2023    Procedure: EGD (ESOPHAGOGASTRODUODENOSCOPY);  Surgeon: Caesar Dickens MD;  Location: [facility];  Service: Endoscopy;  Laterality: N/A;    ESOPHAGOGASTRODUODENOSCOPY N/A 03/28/2024    Procedure: EGD (ESOPHAGOGASTRODUODENOSCOPY);  Surgeon: Jeanmarie Ngo MD;  Location: [facility];  Service: Endoscopy;  Laterality: N/A;    FRACTURE SURGERY  6/96    HERNIA REPAIR Left     Inguinal    INJECTION OF JOINT Right 09/28/2023    Procedure: RT Hip Injection;  Surgeon: Robbin De La Garza DO;  Location: [facility] PAIN MANAGEMENT;  Service: Pain Management;  Laterality: Right;  oral - 20 mins    INJECTION OF JOINT Right 10/08/2024    Procedure: Right hip injection;  Surgeon: Robbin De La Garza DO;  Location: [facility] PAIN MANAGEMENT;  Service: Pain Management;  Laterality: Right;  No sed no AC    UPPER GASTROINTESTINAL ENDOSCOPY N/A 07/07/2022        Family History   Problem Relation Name Age of Onset    Hypertension Mother ya wittgragapito     Breast cancer Mother ya ncflavia     Hypertension Father Irvin Diaz Sr     Prostate cancer Father Irvin Diaz Sr     Bladder Cancer Father Irvin Diaz Sr        Social History     Tobacco  Use    Smoking status: Former     Current packs/day: 0.00     Types: Cigarettes     Quit date:      Years since quittin.3    Smokeless tobacco: Never    Tobacco comments:     quit 20 years ago   Substance Use Topics    Alcohol use: Not Currently     Comment: Quit drinking 21, relapsed last time 2024    Drug use: Not Currently         There were no vitals filed for this visit.     Physical Exam:  There were no vitals taken for this visit.    Physical Exam  Vitals and nursing note reviewed.   Constitutional:       Appearance: Normal appearance.   HENT:      Head: Normocephalic and atraumatic.      Nose: Nose normal.      Mouth/Throat:      Mouth: Mucous membranes are moist.      Pharynx: Oropharynx is clear.   Eyes:      Extraocular Movements: Extraocular movements intact.      Conjunctiva/sclera: Conjunctivae normal.   Cardiovascular:      Rate and Rhythm: Normal rate and regular rhythm.      Heart sounds: Normal heart sounds.   Pulmonary:      Effort: Pulmonary effort is normal.      Breath sounds: Normal breath sounds.   Abdominal:      General: Abdomen is flat. Bowel sounds are normal.      Palpations: Abdomen is soft.   Musculoskeletal:         General: Normal range of motion.      Cervical back: Normal range of motion and neck supple.   Skin:     General: Skin is warm and dry.   Neurological:      General: No focal deficit present.      Mental Status: He is alert and oriented to person, place, and time. Mental status is at baseline.   Psychiatric:         Mood and Affect: Mood normal.          Labs:  Lab Results   Component Value Date    WBC 4.83 2025    RBC 2.95 (L) 2025    HGB 10.0 (L) 2025    HCT 30.8 (L) 2025     (H) 2025    MCH 33.9 (H) 2025    MCHC 32.5 2025    RDW 14.6 (H) 2025    PLT 73 (L) 2025    MPV 10.6 2025    GRAN 1.9 2025    GRAN 53.4 2025    LYMPH 21.4 2025    LYMPH 1.12 2025    MONO 9.6  03/24/2025    MONO 0.50 03/24/2025    EOS 2.5 03/24/2025    EOS 0.13 03/24/2025    BASO 0.03 03/03/2025    EOSINOPHIL 2.5 03/03/2025    BASOPHIL 0.4 03/24/2025    BASOPHIL 0.02 03/24/2025     Sodium   Date Value Ref Range Status   03/03/2025 134 (L) 136 - 145 mmol/L Final   09/02/2018 135 134 - 144 mmol/L      Potassium   Date Value Ref Range Status   03/03/2025 3.9 3.5 - 5.1 mmol/L Final     Chloride   Date Value Ref Range Status   03/03/2025 108 95 - 110 mmol/L Final   09/02/2018 102 98 - 110 mmol/L      CO2   Date Value Ref Range Status   03/03/2025 18 (L) 23 - 29 mmol/L Final     Glucose   Date Value Ref Range Status   03/03/2025 83 70 - 110 mg/dL Final   09/02/2018 99 70 - 99 mg/dL      BUN   Date Value Ref Range Status   03/03/2025 12 6 - 20 mg/dL Final     Creatinine   Date Value Ref Range Status   03/03/2025 1.3 0.5 - 1.4 mg/dL Final   09/02/2018 0.91 0.60 - 1.40 mg/dL      Calcium   Date Value Ref Range Status   03/03/2025 8.2 (L) 8.7 - 10.5 mg/dL Final     Total Protein   Date Value Ref Range Status   03/03/2025 7.3 6.0 - 8.4 g/dL Final     Albumin   Date Value Ref Range Status   03/03/2025 2.3 (L) 3.5 - 5.2 g/dL Final     Total Bilirubin   Date Value Ref Range Status   03/03/2025 3.0 (H) 0.1 - 1.0 mg/dL Final     Comment:     For infants and newborns, interpretation of results should be based  on gestational age, weight and in agreement with clinical  observations.    Premature Infant recommended reference ranges:  Up to 24 hours.............<8.0 mg/dL  Up to 48 hours............<12.0 mg/dL  3-5 days..................<15.0 mg/dL  6-29 days.................<15.0 mg/dL       Alkaline Phosphatase   Date Value Ref Range Status   03/03/2025 98 40 - 150 U/L Final     AST   Date Value Ref Range Status   03/03/2025 40 10 - 40 U/L Final     ALT   Date Value Ref Range Status   03/03/2025 6 (L) 10 - 44 U/L Final     Anion Gap   Date Value Ref Range Status   03/03/2025 8 8 - 16 mmol/L Final     eGFR if African  American   Date Value Ref Range Status   07/21/2022 39.8 (A) >60 mL/min/1.73 m^2 Final     eGFR if non    Date Value Ref Range Status   07/21/2022 34.4 (A) >60 mL/min/1.73 m^2 Final     Comment:     Calculation used to obtain the estimated glomerular filtration  rate (eGFR) is the CKD-EPI equation.          A/P:    Thrombocytopenia and anemia  -nutritional workup negative with normal B12 and folic acid.  I will repeat this again in 1 year given his underlying cirrhosis   -iron studies show a borderline normal ferritin but with normal iron profile.  Therefore I will not start him on any iron tablet right now and just recheck again in 1 year.  -return to clinic in 1 year with lab work including the above along with CBC    Cirrhosis  -AFP within normal limits   -recheck in 1 year      Aurash Khoobehi, MD  Hematology and Oncology    Visit today included increased complexity associated with the care of the episodic problem thrombocytopenia and anemia addressed and managing the longitudinal care of the patient due to the serious and/or complex managed problem(s).

## 2025-05-08 ENCOUNTER — TELEPHONE (OUTPATIENT)
Dept: UROLOGY | Facility: CLINIC | Age: 50
End: 2025-05-08
Payer: MEDICARE

## 2025-05-08 NOTE — TELEPHONE ENCOUNTER
Patient states that all medication prescribed on 5/6/2025 due to his procedure comes up to $430.  Informed patient that he can try Azo over the counter for the pyridium.  Patient is asking if there is a cheaper alternative.  Please advise.

## 2025-05-15 ENCOUNTER — OFFICE VISIT (OUTPATIENT)
Dept: INFECTIOUS DISEASES | Facility: CLINIC | Age: 50
End: 2025-05-15
Payer: MEDICARE

## 2025-05-15 VITALS
DIASTOLIC BLOOD PRESSURE: 76 MMHG | WEIGHT: 238 LBS | BODY MASS INDEX: 32.23 KG/M2 | TEMPERATURE: 98 F | OXYGEN SATURATION: 94 % | SYSTOLIC BLOOD PRESSURE: 140 MMHG | HEART RATE: 64 BPM | HEIGHT: 72 IN

## 2025-05-15 DIAGNOSIS — N41.0 ACUTE PROSTATITIS: ICD-10-CM

## 2025-05-15 DIAGNOSIS — R78.81 MSSA BACTEREMIA: Primary | ICD-10-CM

## 2025-05-15 DIAGNOSIS — K70.31 ALCOHOLIC CIRRHOSIS OF LIVER WITH ASCITES: ICD-10-CM

## 2025-05-15 DIAGNOSIS — B95.61 MSSA BACTEREMIA: Primary | ICD-10-CM

## 2025-05-15 DIAGNOSIS — R60.0 BILATERAL LEG EDEMA: ICD-10-CM

## 2025-05-15 PROCEDURE — 99999 PR PBB SHADOW E&M-EST. PATIENT-LVL IV: CPT | Mod: PBBFAC,,, | Performed by: INTERNAL MEDICINE

## 2025-05-15 PROCEDURE — 99214 OFFICE O/P EST MOD 30 MIN: CPT | Mod: PBBFAC,PN | Performed by: INTERNAL MEDICINE

## 2025-05-15 NOTE — PROGRESS NOTES
Subjective:      Reason for consult:  Hospital follow up    HPI: Irvin Diaz Jr. is a 49 y.o. male with history of alcoholic cirrhosis with portal hypertension, hypertension, GERD.  In an episode of hematuria in October 20, 2024.  Urine culture that time apparently grew MSSA.  Treated with antibiotics with resolution.  Appears he was lost to follow-up to Urology Service.     Started feeling unwell January 2025 around the snowstorm time.  Then started having hematuria again and later developed left foot swelling and pain.  Seen by his PCP on 01/29/2025.  He was diagnosed with cellulitis left foot than prescribed Keflex.  Presented to ER 1/30/25 for evaluation.  Uric acid level was high.  X-ray foot is unremarkable SR 4 screws in the medial malleolus area.  He was given steroid and discharged on allopurinol.  Staphylococcus aureus.  Back in the ER again on 02/04/2025 with left foot pain.  Stabilized and discharged once again.     Hematuria persisted. Left foot swelling also positive but did improve.  Saw Urology NP 02/06/2025.  Called back to the ER because blood culture 02/04/2025 grew.  Noncontrast CT abdomen and pelvis shows cirrhosis but no other acute findings.  Noncontrast CT left ankle and foot shows cellulitis and no abscess.  MRI left ankle and foot documented tenosynovitis but no abscess or osteomyelitis.  Blood cultures later grew MSSA.  TTE and ALLAN were negative for vegetation.  He improved on antibiotics and was discharged on IV cefazolin to complete treatment on 03/07/2025.    He has done well post discharge.  No other acute issues.  Tolerating antibiotics okay.    04/03/2025:  He has completed antibiotics.  No other acute issues CSF for persistent bilateral lower extremity edema.  Also complaining of urine frequency.    05/15/2025:  Since last visit heard UroLift procedure 5/6/for management of nocturia.  States it is better.  Also with abdominal swelling and leg swelling.  Only on spironolactone  50 mg daily.  No other new sign of infection since last visit.    Review of patient's allergies indicates:   Allergen Reactions    Ciprofloxacin hcl Hallucinations    Meperidine Hives     Pt medicated w/multiple medications (demerol, protonix, and zofran)  w/i 10 mins time frame prior to developing localized hives near IV site that med was administered. Pt reports he has/takes all other medications on daily basis.     Morphine Itching    Nsaids (non-steroidal anti-inflammatory drug)      Kidney Disease    Tylenol [acetaminophen]      Hx of liver disease     Past Medical History:   Diagnosis Date    Alcohol withdrawal 5/1/2022    Alcoholic cirrhosis of liver     Alcoholic ketoacidosis 6/6/2021    Arthritis     ATN (acute tubular necrosis)     CHF (congestive heart failure)     Diverticulitis 01/2020    Esophageal varices     GERD (gastroesophageal reflux disease)     GI bleed     Hip arthritis     Left    Hypertension     Liver cirrhosis     Macrocytic anemia     Pulmonary embolism 08/2018    Unprovoked DVT.  Stop Coumadin due to GIB    Thrombocytopenia      Past Surgical History:   Procedure Laterality Date    ANKLE SURGERY      BLOCK, NERVE, PERIPHERAL Left 06/24/2022    Procedure: Left Hip femoral-obturator accessory nerve block;  Surgeon: Robbin De La Garza DO;  Location: [facility];  Service: Pain Management;  Laterality: Left;    COLON SURGERY  2007    COLONOSCOPY  09/05/2019    [facility]    COLONOSCOPY N/A 02/17/2021    Procedure: COLONOSCOPY;  Surgeon: Renea Billingsley MD;  Location: [facility];  Service: Endoscopy;  Laterality: N/A;    COLONOSCOPY N/A 02/13/2023    Procedure: COLONOSCOPY;  Surgeon: Rudy Orellana MD;  Location: [address]);  Service: Endoscopy;  Laterality: N/A;  cirrhosis-labs done on 1/11/23  instr portal-GT  No answer for precall- [place]    COLONOSCOPY N/A 03/10/2023    Procedure: COLONOSCOPY;  Surgeon: Rudy Orellana MD;  Location: [facility] ([address] [place] [address]);  Service: Endoscopy;  Laterality: N/A;  inst  via poral per pt request    COLONOSCOPY W/ BIOPSIES  02/17/2021    CYSTOSCOPY WITH INSERTION OF MINIMALLY INVASIVE IMPLANT TO ENLARGE PROSTATIC URETHRA N/A 5/6/2025    Procedure: CYSTOSCOPY, WITH INSERTION OF UROLIFT IMPLANT;  Surgeon: Nestor Ma MD;  Location: [facility];  Service: Urology;  Laterality: N/A;    ECHOCARDIOGRAM,TRANSESOPHAGEAL N/A 02/10/2025    Procedure: Transesophageal echo (ALLAN) intra-procedure log documentation;  Surgeon: Yunior Abreu MD;  Location: [facility] CATH/EP LAB;  Service: Cardiology;  Laterality: N/A;    ESOPHAGOGASTRODUODENOSCOPY N/A 07/26/2019    Procedure: EGD (ESOPHAGOGASTRODUODENOSCOPY);  Surgeon: Brian Trivedi MD;  Location: [facility];  Service: Endoscopy;  Laterality: N/A;    ESOPHAGOGASTRODUODENOSCOPY N/A 04/08/2020    Procedure: EGD (ESOPHAGOGASTRODUODENOSCOPY);  Surgeon: Donn Acuna MD;  Location: [facility];  Service: Endoscopy;  Laterality: N/A;    ESOPHAGOGASTRODUODENOSCOPY N/A 01/03/2021    Procedure: EGD (ESOPHAGOGASTRODUODENOSCOPY)- coffee ground emesis, hx varices;  Surgeon: Steve Chairez MD;  Location: [facility];  Service: Endoscopy;  Laterality: N/A;    ESOPHAGOGASTRODUODENOSCOPY N/A 05/03/2021    Procedure: EGD (ESOPHAGOGASTRODUODENOSCOPY);  Surgeon: Jeanmarie Ngo MD;  Location: [facility];  Service: Endoscopy;  Laterality: N/A;    ESOPHAGOGASTRODUODENOSCOPY N/A 07/19/2021    Procedure: ESOPHAGOGASTRODUODENOSCOPY (EGD);  Surgeon: Claudio Medeiros MD;  Location: [facility];  Service: Endoscopy;  Laterality: N/A;    ESOPHAGOGASTRODUODENOSCOPY  12/20/2021    ESOPHAGOGASTRODUODENOSCOPY N/A 12/20/2021    Procedure: EGD (ESOPHAGOGASTRODUODENOSCOPY);  Surgeon: Ajay Jackson MD;  Location: [facility];  Service: Endoscopy;  Laterality: N/A;    ESOPHAGOGASTRODUODENOSCOPY N/A 05/03/2022    Procedure: EGD (ESOPHAGOGASTRODUODENOSCOPY);  Surgeon: Brian Trivedi MD;  Location: [facility];  Service: Endoscopy;  Laterality: N/A;     ESOPHAGOGASTRODUODENOSCOPY N/A 2022    Procedure: EGD (ESOPHAGOGASTRODUODENOSCOPY);  Surgeon: Ajay Jackson MD;  Location: [facility];  Service: Endoscopy;  Laterality: N/A;    ESOPHAGOGASTRODUODENOSCOPY N/A 2022    Procedure: EGD (ESOPHAGOGASTRODUODENOSCOPY);  Surgeon: Edouard Maynard MD;  [facility]  Service: Endoscopy;  Laterality: N/A;    ESOPHAGOGASTRODUODENOSCOPY N/A 2023    Procedure: EGD (ESOPHAGOGASTRODUODENOSCOPY);  Surgeon: Caesar Dickens MD;  Location: [facility];  Service: Endoscopy;  Laterality: N/A;    ESOPHAGOGASTRODUODENOSCOPY N/A 2024    Procedure: EGD (ESOPHAGOGASTRODUODENOSCOPY);  Surgeon: Jeanmarie Ngo MD;  Location: [facility];  Service: Endoscopy;  Laterality: N/A;    FRACTURE SURGERY      HERNIA REPAIR Left     Inguinal    INJECTION OF JOINT Right 2023    Procedure: RT Hip Injection;  Surgeon: Robbin De La Garza DO;  Location: [facility] PAIN MANAGEMENT;  Service: Pain Management;  Laterality: Right;  oral - 20 mins    INJECTION OF JOINT Right 10/08/2024    Procedure: Right hip injection;  Surgeon: Robbin De La Garza DO;  Location: [facility] PAIN MANAGEMENT;  Service: Pain Management;  Laterality: Right;  No sed no AC    Prostatic Urethral Lift (Urolift)  2025    UPPER GASTROINTESTINAL ENDOSCOPY N/A 2022      Social History     Tobacco Use    Smoking status: Former     Current packs/day: 0.00     Types: Cigarettes     Quit date: 2000     Years since quittin.3    Smokeless tobacco: Never    Tobacco comments:     quit 20 years ago   Substance Use Topics    Alcohol use: Not Currently     Comment: Quit drinking 21, relapsed last time 2024     Family History   Problem Relation Name Age of Onset    Hypertension Mother pat ncgruder     Breast cancer Mother pat ncgruder     Hypertension Father Irvin Diaz Sr     Prostate cancer Father Irvin Diaz Sr     Bladder Cancer Father Irvin Diaz Sr         Pertinent medications noted:     Review of Systems  ten system review unremarkable.  As in HPI    Outdoor activities:  Lives with his wife.  No pets at home.  No alcohol  Travel:  No recent travel  Implants:  None  Antibiotic History:  As above.  Currently on cefazolin      Objective:      Blood pressure (!) 140/76, pulse 64, temperature 98.4 °F (36.9 °C), temperature source Temporal, height 6' (1.829 m), weight 108 kg (238 lb), SpO2 (!) 94%. Body mass index is 32.28 kg/m².  Physical Exam      General:  Middle-aged man in no acute distress   HEENT: No oral thrush   CVS: S1 and 2 heard, no murmurs appreciated  Respiratory:  Clear to auscultation   Abdomen:  Full, soft, nontender, no palpable organomegaly  Skin: No rash   CNS:  No gross focal deficits   Musculoskeletal: No joint or bony abnormalities appreciated  Lower extremity: Bilateral ankle edema up to mid leg, right > Left    Wound:  None    VAD:  None      General Labs reviewed:  Lab Results   Component Value Date    WBC 4.83 04/28/2025    RBC 2.95 (L) 04/28/2025    HGB 10.0 (L) 04/28/2025    HCT 30.8 (L) 04/28/2025     (H) 04/28/2025    MCH 33.9 (H) 04/28/2025    MCHC 32.5 04/28/2025    RDW 14.6 (H) 04/28/2025    PLT 73 (L) 04/28/2025    MPV 10.6 04/28/2025    GRAN 1.9 03/03/2025    GRAN 53.4 03/03/2025    LYMPH 21.4 03/24/2025    LYMPH 1.12 03/24/2025    MONO 9.6 03/24/2025    MONO 0.50 03/24/2025    EOS 2.5 03/24/2025    EOS 0.13 03/24/2025    BASO 0.03 03/03/2025    EOSINOPHIL 2.5 03/03/2025    BASOPHIL 0.4 03/24/2025    BASOPHIL 0.02 03/24/2025     CMP  Sodium   Date Value Ref Range Status   03/03/2025 134 (L) 136 - 145 mmol/L Final   09/02/2018 135 134 - 144 mmol/L      Potassium   Date Value Ref Range Status   03/03/2025 3.9 3.5 - 5.1 mmol/L Final     Chloride   Date Value Ref Range Status   03/03/2025 108 95 - 110 mmol/L Final   09/02/2018 102 98 - 110 mmol/L      CO2   Date Value Ref Range Status   03/03/2025 18 (L) 23 - 29 mmol/L Final      Glucose   Date Value Ref Range Status   03/03/2025 83 70 - 110 mg/dL Final   09/02/2018 99 70 - 99 mg/dL      BUN   Date Value Ref Range Status   03/03/2025 12 6 - 20 mg/dL Final     Creatinine   Date Value Ref Range Status   03/03/2025 1.3 0.5 - 1.4 mg/dL Final   09/02/2018 0.91 0.60 - 1.40 mg/dL      Calcium   Date Value Ref Range Status   03/03/2025 8.2 (L) 8.7 - 10.5 mg/dL Final     Total Protein   Date Value Ref Range Status   03/03/2025 7.3 6.0 - 8.4 g/dL Final     Albumin   Date Value Ref Range Status   03/03/2025 2.3 (L) 3.5 - 5.2 g/dL Final     Total Bilirubin   Date Value Ref Range Status   03/03/2025 3.0 (H) 0.1 - 1.0 mg/dL Final     Comment:     For infants and newborns, interpretation of results should be based  on gestational age, weight and in agreement with clinical  observations.    Premature Infant recommended reference ranges:  Up to 24 hours.............<8.0 mg/dL  Up to 48 hours............<12.0 mg/dL  3-5 days..................<15.0 mg/dL  6-29 days.................<15.0 mg/dL       Alkaline Phosphatase   Date Value Ref Range Status   03/03/2025 98 40 - 150 U/L Final     AST   Date Value Ref Range Status   03/03/2025 40 10 - 40 U/L Final     ALT   Date Value Ref Range Status   03/03/2025 6 (L) 10 - 44 U/L Final     Anion Gap   Date Value Ref Range Status   03/03/2025 8 8 - 16 mmol/L Final     eGFR   Date Value Ref Range Status   03/03/2025 >60 >60 mL/min/1.73 m^2 Final           Micro:  Microbiology Results (last 7 days)       ** No results found for the last 168 hours. **          Imaging Reviewed:          Assessment:      High-grade MSSA bacteremia in a patient with recurrent MSSA bacteriuria.  TTE and ALLAN were negative for vegetation.  Treated.  Resolved.      2. Recurrent MSSA bacteriuria with hematuria.  CT abdomen and pelvis with contrast negative for prostate on renal abscess.  This was treated as acute prostatitis.  Completed antibiotics around 03/10/2025.  Clinically resolved.      3. Bilateral lower extremities edema related to cirrhosis.  Currently on on Aldactone 50 mg daily.  Optimize diuretics as per PCP and GI.      4. Compensated alcoholic cirrhosis.  Immune to HAV and HBV.  HCV screen negative on multiple locations.  Management as per PCP and GI     5. Macrocytic anemia.  Most likely related to cirrhosis.     6. CKD    7. Gout.  No acute flare at this time.    He will be discharged from ID Clinic today.  I will be happy to see again in the future if clinically indicated.    Problem List Items Addressed This Visit    None       Plan:     As above.      There are no diagnoses linked to this encounter.    This note was created using Dragon voice recognition software that occasionally misinterpreted phrases or words.

## 2025-05-15 NOTE — PATIENT INSTRUCTIONS
We will discharge him from ID clinic today   We can see you in the future if clinically indicated   Continue to follow up with your PCP and your other physicians.

## 2025-05-21 DIAGNOSIS — I10 BENIGN ESSENTIAL HTN: ICD-10-CM

## 2025-05-21 DIAGNOSIS — R78.81 BACTEREMIA DUE TO COAGULASE-NEGATIVE STAPHYLOCOCCUS: ICD-10-CM

## 2025-05-21 DIAGNOSIS — N41.0 PROSTATITIS, ACUTE: ICD-10-CM

## 2025-05-21 DIAGNOSIS — B95.7 BACTEREMIA DUE TO COAGULASE-NEGATIVE STAPHYLOCOCCUS: ICD-10-CM

## 2025-05-21 RX ORDER — TAMSULOSIN HYDROCHLORIDE 0.4 MG/1
0.4 CAPSULE ORAL DAILY
Qty: 90 CAPSULE | Refills: 3 | Status: SHIPPED | OUTPATIENT
Start: 2025-05-21 | End: 2026-05-16

## 2025-05-21 NOTE — TELEPHONE ENCOUNTER
No care due was identified.  [facility] Embedded Care Due Messages. Reference number: [id number].   5/21/2025 2:07:11 PM CDT

## 2025-05-21 NOTE — TELEPHONE ENCOUNTER
----- Message from Carmelita sent at 5/21/2025  9:51 AM CDT -----  Regarding: refill  Contact: [phone]  Pt is calling to speak with someone in provider office in regards to having the provider refill his fluid pill pt didn't know the name of the medication pt  is asking for a return call please call pt at [phone] pt stated provider would know the name of the medication I explained I needed to know the name of the medication

## 2025-06-10 ENCOUNTER — OFFICE VISIT (OUTPATIENT)
Dept: PRIMARY CARE CLINIC | Facility: CLINIC | Age: 50
End: 2025-06-10
Payer: MEDICARE

## 2025-06-10 VITALS
WEIGHT: 245.06 LBS | DIASTOLIC BLOOD PRESSURE: 88 MMHG | BODY MASS INDEX: 33.19 KG/M2 | HEIGHT: 72 IN | RESPIRATION RATE: 18 BRPM | SYSTOLIC BLOOD PRESSURE: 150 MMHG

## 2025-06-10 DIAGNOSIS — I10 BENIGN ESSENTIAL HTN: ICD-10-CM

## 2025-06-10 DIAGNOSIS — Z00.00 ANNUAL PHYSICAL EXAM: Primary | ICD-10-CM

## 2025-06-10 PROBLEM — I47.20 VENTRICULAR TACHYCARDIA: Status: RESOLVED | Noted: 2024-03-29 | Resolved: 2025-06-10

## 2025-06-10 PROCEDURE — 99999 PR PBB SHADOW E&M-EST. PATIENT-LVL III: CPT | Mod: PBBFAC,,, | Performed by: STUDENT IN AN ORGANIZED HEALTH CARE EDUCATION/TRAINING PROGRAM

## 2025-06-10 PROCEDURE — 99213 OFFICE O/P EST LOW 20 MIN: CPT | Mod: PBBFAC,PN | Performed by: STUDENT IN AN ORGANIZED HEALTH CARE EDUCATION/TRAINING PROGRAM

## 2025-06-10 NOTE — PROGRESS NOTES
Subjective:       Patient ID: Irvin Diaz Jr. is a 49 y.o. male.    Chief Complaint: No chief complaint on file.      HPI:  49 y.o. male presents to [facility] for follow-up care    Acute concerns?: No acute concerns. Following with Pain Management for finding and will need hip replacement. Has been sober and not drinking for 1 year now and plans to proceed with Hepatology recommendations and surgery.    Had prostatic urethral lift 5/6/2025 through Urology. Has been doing well since.    Diet?: No specific  Exercise?: No specific    Review of Systems   Constitutional:  Negative for chills, diaphoresis, fatigue and fever.   HENT:  Negative for congestion, sinus pressure, sneezing and sore throat.    Respiratory:  Negative for cough and shortness of breath.    Cardiovascular:  Negative for chest pain and palpitations.   Gastrointestinal:  Negative for abdominal pain, diarrhea, nausea and vomiting.   Musculoskeletal:  Positive for arthralgias (Left hip and leg, right ankle). Negative for joint swelling and myalgias.   Skin:  Negative for rash and wound.   Neurological:  Negative for dizziness and weakness.       Objective:      Vitals:    06/10/25 1105   BP: (!) 150/88   BP Location: Right arm   Patient Position: Sitting   Resp: 18   Weight: 111.1 kg (245 lb 0.7 oz)   Height: 6' (1.829 m)     Physical Exam  Vitals reviewed.   Constitutional:       General: He is not in acute distress.     Appearance: Normal appearance. He is not ill-appearing.   HENT:      Head: Normocephalic and atraumatic.   Eyes:      General:         Right eye: No discharge.         Left eye: No discharge.      Conjunctiva/sclera: Conjunctivae normal.   Cardiovascular:      Rate and Rhythm: Normal rate and regular rhythm.      Pulses: Normal pulses.      Heart sounds: No murmur heard.  Pulmonary:      Effort: Pulmonary effort is normal.      Breath sounds: Normal breath sounds.   Musculoskeletal:         General: No deformity.      Cervical  back: Neck supple. No rigidity.   Lymphadenopathy:      Cervical: No cervical adenopathy.   Skin:     General: Skin is warm and dry.      Coloration: Skin is not jaundiced.   Neurological:      General: No focal deficit present.      Mental Status: He is alert and oriented to person, place, and time.   Psychiatric:         Mood and Affect: Mood normal.         Behavior: Behavior normal.             Lab Results   Component Value Date     (L) 03/03/2025     09/02/2018    K 3.9 03/03/2025     03/03/2025     09/02/2018    CO2 18 (L) 03/03/2025    BUN 12 03/03/2025    CREATININE 1.3 03/03/2025    CREATININE 0.91 09/02/2018    ANIONGAP 8 03/03/2025     Lab Results   Component Value Date    HGBA1C 4.8 06/12/2024     Lab Results   Component Value Date    BNP 88 02/04/2025     (H) 06/29/2024    BNP 48 04/28/2023       Lab Results   Component Value Date    WBC 4.83 04/28/2025    WBC 5.23 03/24/2025    HGB 10.0 (L) 04/28/2025    HCT 30.8 (L) 04/28/2025    HCT 25 (L) 11/01/2022    PLT 73 (L) 04/28/2025    GRAN 1.9 03/03/2025    GRAN 53.4 03/03/2025     Lab Results   Component Value Date    CHOL 167 07/01/2024    HDL 28 (L) 07/01/2024    LDLCALC 128.0 07/01/2024    TRIG 55 07/01/2024        Current Medications[1]        Assessment:       1. Annual physical exam    2. Benign essential HTN           Plan:       Annual physical exam  Benign essential HTN  -     MICROALBUMIN / CREATININE RATIO URINE  - Health maintenance items reviewed today's visit  - Diet and exercise guidance provided today's visit  - Keep follow-up with Hepatology, Pain Management, and Orthopaedics    RTC in 1 year         [1]   Current Outpatient Medications:     ferrous gluconate (FERATE) 240 (27 FE) MG tablet, Take 2 tablets (480 mg total) by mouth 2 (two) times daily with meals., Disp: 120 tablet, Rfl: 3    folic acid (FOLVITE) 1 MG tablet, Take 1 tablet (1 mg total) by mouth once daily., Disp: 30 tablet, Rfl: 5    metoprolol  tartrate (LOPRESSOR) 50 MG tablet, Take 1 tablet (50 mg total) by mouth 2 (two) times daily. Do not take if heart rate is less than 60, Disp: 60 tablet, Rfl: 11    nortriptyline (PAMELOR) 25 MG capsule, Take 1 capsule (25 mg total) by mouth every evening., Disp: 90 capsule, Rfl: 3    oxybutynin (DITROPAN) 5 MG Tab, Take 1 tablet (5 mg total) by mouth 3 (three) times daily as needed (bladder spasms (urge to urinate, pain in bladder))., Disp: 30 tablet, Rfl: 0    papaverine 30 mg/mL injection, Add phentolamine 10 mgs/ml., add PGE1  100 micrograms., Disp: 10 mL, Rfl: 12    rifAXIMin (XIFAXAN) 550 mg Tab, Take 1 tablet (550 mg total) by mouth 2 (two) times daily., Disp: 60 tablet, Rfl: 11    spironolactone (ALDACTONE) 25 MG tablet, Take 1 tablet (25 mg total) by mouth once daily., Disp: 90 tablet, Rfl: 3    suzetrigine 50 mg Tab, Take 50 mg by mouth 2 (two) times a day. Take 100 mg for the first dose, 50 mg for subsequent doses., Disp: 15 tablet, Rfl: 0    tadalafiL (CIALIS) 20 MG Tab, Take 1 tablet (20 mg total) by mouth daily as needed (erectile dysfunction)., Disp: 15 tablet, Rfl: 11    tamsulosin (FLOMAX) 0.4 mg Cap, Take 1 capsule (0.4 mg total) by mouth once daily., Disp: 90 capsule, Rfl: 3    thiamine 100 MG tablet, Take 1 tablet (100 mg total) by mouth once daily., Disp: 30 tablet, Rfl: 5    traMADoL (ULTRAM) 50 mg tablet, Take 1 tablet (50 mg total) by mouth 4 (four) times daily as needed for Pain., Disp: 120 each, Rfl: 2

## 2025-06-11 ENCOUNTER — RESULTS FOLLOW-UP (OUTPATIENT)
Dept: PRIMARY CARE CLINIC | Facility: CLINIC | Age: 50
End: 2025-06-11
Payer: MEDICARE

## 2025-06-11 LAB
ALBUMIN/CREAT UR: 12.8 UG/MG
CREAT UR-MCNC: 336 MG/DL (ref 23–375)
MICROALBUMIN UR-MCNC: 43 UG/ML (ref ?–5000)

## 2025-06-23 DIAGNOSIS — Z00.00 ENCOUNTER FOR MEDICARE ANNUAL WELLNESS EXAM: ICD-10-CM

## 2025-06-30 ENCOUNTER — OFFICE VISIT (OUTPATIENT)
Dept: UROLOGY | Facility: CLINIC | Age: 50
End: 2025-06-30
Payer: MEDICARE

## 2025-06-30 VITALS
SYSTOLIC BLOOD PRESSURE: 139 MMHG | HEART RATE: 56 BPM | DIASTOLIC BLOOD PRESSURE: 83 MMHG | BODY MASS INDEX: 33.18 KG/M2 | HEIGHT: 72 IN | WEIGHT: 244.94 LBS

## 2025-06-30 DIAGNOSIS — N32.81 OVERACTIVE BLADDER: ICD-10-CM

## 2025-06-30 DIAGNOSIS — N13.8 BPH WITH OBSTRUCTION/LOWER URINARY TRACT SYMPTOMS: Primary | ICD-10-CM

## 2025-06-30 DIAGNOSIS — N40.1 BPH WITH OBSTRUCTION/LOWER URINARY TRACT SYMPTOMS: Primary | ICD-10-CM

## 2025-06-30 PROCEDURE — 99214 OFFICE O/P EST MOD 30 MIN: CPT | Mod: S$PBB,,, | Performed by: STUDENT IN AN ORGANIZED HEALTH CARE EDUCATION/TRAINING PROGRAM

## 2025-06-30 PROCEDURE — 99213 OFFICE O/P EST LOW 20 MIN: CPT | Mod: PBBFAC,PN | Performed by: STUDENT IN AN ORGANIZED HEALTH CARE EDUCATION/TRAINING PROGRAM

## 2025-06-30 PROCEDURE — 99999 PR PBB SHADOW E&M-EST. PATIENT-LVL III: CPT | Mod: PBBFAC,,, | Performed by: STUDENT IN AN ORGANIZED HEALTH CARE EDUCATION/TRAINING PROGRAM

## 2025-06-30 RX ORDER — TAMSULOSIN HYDROCHLORIDE 0.4 MG/1
0.4 CAPSULE ORAL DAILY
Qty: 30 CAPSULE | Refills: 11 | Status: SHIPPED | OUTPATIENT
Start: 2025-06-30 | End: 2026-06-25

## 2025-06-30 NOTE — PROGRESS NOTES
[facility] - Urology [facility] [address]   Clinic Note    SUBJECTIVE:     Chief Complaint: BPH    History of Present Illness:  Irvin Diaz Jr. is a 49 y.o. male who presents to clinic for BPH. He is established to our clinic.     He has a history of recurrent UTIs and bothersome LUTS on Flomax.  He underwent UroLift with me 5/6/25.    He stopped taking Flomax but is now having bothersome LUTS - weak stream, nocturia x 6, daytime frequency.      OBJECTIVE:     Estimated body mass index is 33.22 kg/m² as calculated from the following:    Height as of this encounter: 6' (1.829 m).    Weight as of this encounter: 111.1 kg (244 lb 14.9 oz).    Vital Signs (Most Recent)  Pulse: (!) 56 (06/30/25 0921)  BP: 139/83 (06/30/25 0921)    Physical Exam  Vitals reviewed.   Constitutional:       Appearance: Normal appearance.   HENT:      Head: Normocephalic and atraumatic.   Eyes:      Conjunctiva/sclera: Conjunctivae normal.   Cardiovascular:      Rate and Rhythm: Normal rate.   Pulmonary:      Effort: Pulmonary effort is normal. No respiratory distress.   Skin:     General: Skin is warm and dry.   Neurological:      General: No focal deficit present.      Mental Status: He is alert and oriented to person, place, and time.   Psychiatric:         Mood and Affect: Mood normal.         Behavior: Behavior normal.         Thought Content: Thought content normal.         Judgment: Judgment normal.         Lab Results   Component Value Date    BUN 12 03/03/2025    CREATININE 1.3 03/03/2025    WBC 4.83 04/28/2025    HGB 10.0 (L) 04/28/2025    HCT 30.8 (L) 04/28/2025    PLT 73 (L) 04/28/2025    AST 40 03/03/2025    ALT 6 (L) 03/03/2025    ALKPHOS 98 03/03/2025    ALBUMIN 2.3 (L) 03/03/2025    HGBA1C 4.8 06/12/2024        Lab Results   Component Value Date    PSA 1.9 10/22/2024    PSA 1.6 09/05/2024    PSA 1.2 07/21/2022    PSA 1.2 10/27/2020     UA - unable to give specimen (voided just prior to arrival.)    ASSESSMENT     1. BPH with  obstruction/lower urinary tract symptoms    2. Overactive bladder      PLAN:   1. BPH with obstruction/lower urinary tract symptoms  -     tamsulosin (FLOMAX) 0.4 mg Cap; Take 1 capsule (0.4 mg total) by mouth once daily.  Dispense: 30 capsule; Refill: 11    2. Overactive bladder  -     tamsulosin (FLOMAX) 0.4 mg Cap; Take 1 capsule (0.4 mg total) by mouth once daily.  Dispense: 30 capsule; Refill: 11       Restart Flomax. RTC 2 months with TAE.    Nestor Ma MD

## 2025-07-03 ENCOUNTER — PATIENT MESSAGE (OUTPATIENT)
Dept: ADMINISTRATIVE | Facility: HOSPITAL | Age: 50
End: 2025-07-03
Payer: MEDICARE

## 2025-07-17 ENCOUNTER — TELEPHONE (OUTPATIENT)
Dept: HEPATOLOGY | Facility: CLINIC | Age: 50
End: 2025-07-17
Payer: MEDICARE

## 2025-07-17 NOTE — TELEPHONE ENCOUNTER
Copied from CRM #[id number]. Topic: Appointments - Appointment Access  >> Jul 17, 2025  9:18 AM Tamika wrote:  SCHEDULING/REQUEST    Appt Type: EST    Date/Time Preference: Prior to 7/21    Treating Provider: Gibson     Caller Name: Irvin    Contact Preference:  [phone]      Comments/Notes: Pt would like to schedule in Labs prior to [place] [facility] would like a allen back to confirm time and dates

## 2025-07-17 NOTE — TELEPHONE ENCOUNTER
Called pt to help him schedule labs prior to the visit with Dr Arreaga. Labs were scheduled on 7/18/25 at [facility] per pt's request prior to his video visit on 7/21/25.

## 2025-07-21 ENCOUNTER — OFFICE VISIT (OUTPATIENT)
Dept: HEPATOLOGY | Facility: CLINIC | Age: 50
End: 2025-07-21
Payer: MEDICARE

## 2025-07-21 ENCOUNTER — TELEPHONE (OUTPATIENT)
Dept: ORTHOPEDICS | Facility: CLINIC | Age: 50
End: 2025-07-21
Payer: MEDICARE

## 2025-07-21 ENCOUNTER — TELEPHONE (OUTPATIENT)
Dept: HEPATOLOGY | Facility: CLINIC | Age: 50
End: 2025-07-21

## 2025-07-21 DIAGNOSIS — I85.10 SECONDARY ESOPHAGEAL VARICES WITHOUT BLEEDING: Primary | Chronic | ICD-10-CM

## 2025-07-21 DIAGNOSIS — K70.31 ALCOHOLIC CIRRHOSIS OF LIVER WITH ASCITES: Chronic | ICD-10-CM

## 2025-07-21 PROCEDURE — G2211 COMPLEX E/M VISIT ADD ON: HCPCS | Mod: 95,,, | Performed by: INTERNAL MEDICINE

## 2025-07-21 PROCEDURE — 98006 SYNCH AUDIO-VIDEO EST MOD 30: CPT | Mod: 95,,, | Performed by: INTERNAL MEDICINE

## 2025-07-21 NOTE — TELEPHONE ENCOUNTER
Returned call to pt and schedule him for labs on 7/25/25 at [place] location per pt's request. Patient placed on 4 mths recall when the schedule for November opens up.

## 2025-07-21 NOTE — TELEPHONE ENCOUNTER
----- Message from Jacek Arreaga MD sent at 7/21/2025  9:22 AM CDT -----  Hepatology team- Labs in the next few days and clinic in 4 months please

## 2025-07-21 NOTE — PROGRESS NOTES
Subjective:       Patient ID: Irvin Diaz Jr. is a 49 y.o. male.    Chief Complaint: No chief complaint on file.  The patient location is: Home  The chief complaint leading to consultation is: Cirrhosis    Visit type: audiovisual    Face to Face time with patient: 20 minutes of total time spent on the encounter, which includes face to face time and non-face to face time preparing to see the patient (eg, review of tests), Obtaining and/or reviewing separately obtained history, Documenting clinical information in the electronic or other health record, Independently interpreting results (not separately reported) and communicating results to the patient/family/caregiver, or Care coordination (not separately reported).     Each patient to whom he or she provides medical services by telemedicine is:  (1) informed of the relationship between the physician and patient and the respective role of any other health care provider with respect to management of the patient; and (2) notified that he or she may decline to receive medical services by telemedicine and may withdraw from such care at any time.    Notes:       HPI  I saw this 49 y.o. man with decompensated cirrhosis related to alcohol.    He has had some minor variceal bleeds - been to [facility] in [phone].  - previously banded  - more recently he's had some leg edema and was started on diuretics.    In addition, he was in hospital in Feb/March 2025 with sepsis related to his urinary tract.    Stopped drinking in April 2024  - last PEth on 8/12/24 was negative  He has been abstinenet now for about 14 months.    EGD: 3/28/24  Small (< 5 mm) varices were found in the lower third of the        esophagus.        A small post variceal banding scar was found in the lower third of        the esophagus.        The stomach was normal.        The examined duodenum was normal     No episodes of HE.    CT abdo: 2/8/25  1. Cirrhosis with findings of portal  hypertension, including portosystemic collateral formation, mild splenomegaly, and mild perihepatic ascites.  2. Stable bilateral perinephric fat stranding.  No evidence of renal mass, calculus, or hydronephrosis.   Deformity of the proximal left femur and severe left hip osteoarthritic changes are noted.  Changes of avascular necrosis and osteo arthritis at the right hip are stable    Abdo US: 6/17/25  1. Cirrhosis.  No focal hepatic lesions.  2. Sequela of portal hypertension including trace ascites, splenomegaly, a recanalized umbilical vein and splenic collaterals.    Alcohol hx:  - 1/5 of gin per day  - completed outpatient rehab at [facility] and managed to stay off alcohol for 5 months but relapsed a couple of times      No DUI  No legal troubles with alcohol    [facility]  No alcohol for 10 months- last alcohol in April 2024    MELD 3.0: 20 at 2/11/2025  5:02 AM  MELD-Na: 19 at 2/11/2025  5:02 AM  Calculated from:  Serum Creatinine: 2 mg/dL at 2/11/2025  5:02 AM  Serum Sodium: 137 mmol/L at 2/11/2025  5:02 AM  Total Bilirubin: 2 mg/dL at 2/11/2025  5:02 AM  Serum Albumin: 2.4 g/dL at 2/11/2025  5:02 AM  INR(ratio): 1.3 at 2/9/2025  4:10 PM  Age at listing (hypothetical): 49 years  Sex: Male at 2/11/2025  5:02 AM      PMH:  Colon resection- 2007- mass- not Ca  Diverticulitis  CKD  Hypertension  Hip OA- left hip  PE- 2018- stopped warfarin due to GI bleed      SH:  Disabled  Ex-smoker (1 pack robert day for 3 years in his 20s)  Lives with mother  6 kids- healthy    FH:  Nil liver      Review of Systems   Constitutional:  Negative for activity change, appetite change, chills, fatigue, fever and unexpected weight change.   HENT:  Negative for hearing loss.    Eyes:  Negative for discharge and visual disturbance.   Respiratory:  Negative for cough, chest tightness, shortness of breath and wheezing.    Cardiovascular:  Negative for chest pain, palpitations and leg swelling.    Gastrointestinal:  Negative for abdominal distention, abdominal pain, constipation, diarrhea and nausea.   Genitourinary:  Negative for dysuria and frequency.   Musculoskeletal:  Negative for arthralgias and back pain.   Skin:  Negative for pallor and rash.   Neurological:  Negative for dizziness, tremors, speech difficulty and headaches.   Hematological:  Negative for adenopathy.   Psychiatric/Behavioral:  Negative for agitation and confusion.          Lab Results   Component Value Date    ALT 6 (L) 03/03/2025    AST 40 03/03/2025    GGT 42 09/05/2024    ALKPHOS 98 03/03/2025    BILITOT 3.0 (H) 03/03/2025     Past Medical History:   Diagnosis Date    Alcohol withdrawal 5/1/2022    Alcoholic cirrhosis of liver     Alcoholic ketoacidosis 6/6/2021    Arthritis     ATN (acute tubular necrosis)     CHF (congestive heart failure)     Diverticulitis 01/2020    Esophageal varices     GERD (gastroesophageal reflux disease)     GI bleed     Hip arthritis     Left    Hypertension     Liver cirrhosis     Macrocytic anemia     Pulmonary embolism 08/2018    Unprovoked DVT.  Stop Coumadin due to GIB    Thrombocytopenia      Past Surgical History:   Procedure Laterality Date    ANKLE SURGERY      BLOCK, NERVE, PERIPHERAL Left 06/24/2022    Procedure: Left Hip femoral-obturator accessory nerve block;  Surgeon: Robbin De La Garza DO;  Location: [facility];  Service: Pain Management;  Laterality: Left;    COLON SURGERY  2007    COLONOSCOPY  09/05/2019    [facility]    COLONOSCOPY N/A 02/17/2021    Procedure: COLONOSCOPY;  Surgeon: Renea Billingsley MD;  Location: [facility];  Service: Endoscopy;  Laterality: N/A;    COLONOSCOPY N/A 02/13/2023    Procedure: COLONOSCOPY;  Surgeon: Rudy Orellana MD;  Location: [facility] ([address] [place] [address]);  Service: Endoscopy;  Laterality: N/A;  cirrhosis-labs done on 1/11/23  instr portal-GT  No answer for precall- [place]    COLONOSCOPY N/A 03/10/2023    Procedure: COLONOSCOPY;  Surgeon: Rudy Orellana MD;  Location: [facility]  (4TH FLR);  Service: Endoscopy;  Laterality: N/A;  inst via poral per pt request    COLONOSCOPY W/ BIOPSIES  02/17/2021    CYSTOSCOPY WITH INSERTION OF MINIMALLY INVASIVE IMPLANT TO ENLARGE PROSTATIC URETHRA N/A 5/6/2025    Procedure: CYSTOSCOPY, WITH INSERTION OF UROLIFT IMPLANT;  Surgeon: Nestor Ma MD;  Location: [facility];  Service: Urology;  Laterality: N/A;    ECHOCARDIOGRAM,TRANSESOPHAGEAL N/A 02/10/2025    Procedure: Transesophageal echo (ALLAN) intra-procedure log documentation;  Surgeon: Yunior Abreu MD;  Location: [facility] CATH/EP LAB;  Service: Cardiology;  Laterality: N/A;    ESOPHAGOGASTRODUODENOSCOPY N/A 07/26/2019    Procedure: EGD (ESOPHAGOGASTRODUODENOSCOPY);  Surgeon: Brian Trivedi MD;  Location: [facility];  Service: Endoscopy;  Laterality: N/A;    ESOPHAGOGASTRODUODENOSCOPY N/A 04/08/2020    Procedure: EGD (ESOPHAGOGASTRODUODENOSCOPY);  Surgeon: Donn Acuna MD;  Location: [facility];  Service: Endoscopy;  Laterality: N/A;    ESOPHAGOGASTRODUODENOSCOPY N/A 01/03/2021    Procedure: EGD (ESOPHAGOGASTRODUODENOSCOPY)- coffee ground emesis, hx varices;  Surgeon: Steve Chairez MD;  Location: [facility];  Service: Endoscopy;  Laterality: N/A;    ESOPHAGOGASTRODUODENOSCOPY N/A 05/03/2021    Procedure: EGD (ESOPHAGOGASTRODUODENOSCOPY);  Surgeon: Jeanmarie Ngo MD;  Location: [facility];  Service: Endoscopy;  Laterality: N/A;    ESOPHAGOGASTRODUODENOSCOPY N/A 07/19/2021    Procedure: ESOPHAGOGASTRODUODENOSCOPY (EGD);  Surgeon: Claudio Medeiros MD;  Location: [facility];  Service: Endoscopy;  Laterality: N/A;    ESOPHAGOGASTRODUODENOSCOPY  12/20/2021    ESOPHAGOGASTRODUODENOSCOPY N/A 12/20/2021    Procedure: EGD (ESOPHAGOGASTRODUODENOSCOPY);  Surgeon: Ajay Jackson MD;  Location: [facility];  Service: Endoscopy;  Laterality: N/A;    ESOPHAGOGASTRODUODENOSCOPY N/A 05/03/2022    Procedure: EGD (ESOPHAGOGASTRODUODENOSCOPY);  Surgeon: Brian Trivedi MD;  Location: [facility];  Service:  Endoscopy;  Laterality: N/A;    ESOPHAGOGASTRODUODENOSCOPY N/A 07/07/2022    Procedure: EGD (ESOPHAGOGASTRODUODENOSCOPY);  Surgeon: Ajay Jackson MD;  Location: [facility];  Service: Endoscopy;  Laterality: N/A;    ESOPHAGOGASTRODUODENOSCOPY N/A 11/29/2022    Procedure: EGD (ESOPHAGOGASTRODUODENOSCOPY);  Surgeon: Edouard Maynard MD;  Location: [facility] ([address] [place] [address]);  Service: Endoscopy;  Laterality: N/A;    ESOPHAGOGASTRODUODENOSCOPY N/A 04/28/2023    Procedure: EGD (ESOPHAGOGASTRODUODENOSCOPY);  Surgeon: Caesar Dickens MD;  Location: [facility];  Service: Endoscopy;  Laterality: N/A;    ESOPHAGOGASTRODUODENOSCOPY N/A 03/28/2024    Procedure: EGD (ESOPHAGOGASTRODUODENOSCOPY);  Surgeon: Jeanmarie Ngo MD;  Location: [facility];  Service: Endoscopy;  Laterality: N/A;    FRACTURE SURGERY  6/96    HERNIA REPAIR Left     Inguinal    INJECTION OF JOINT Right 09/28/2023    Procedure: RT Hip Injection;  Surgeon: Robbin De La Garza DO;  Location: [facility]IN [facility];  Service: Pain Management;  Laterality: Right;  oral - 20 mins    INJECTION OF JOINT Right 10/08/2024    Procedure: Right hip injection;  Surgeon: Robbin De La Garza DO;  Location: [facility];  Service: Pain Management;  Laterality: Right;  No sed no AC    Prostatic Urethral Lift (Urolift)  05/06/2025    UPPER GASTROINTESTINAL ENDOSCOPY N/A 07/07/2022     Current Outpatient Medications   Medication Sig    ferrous gluconate (FERATE) 240 (27 FE) MG tablet Take 2 tablets (480 mg total) by mouth 2 (two) times daily with meals.    folic acid (FOLVITE) 1 MG tablet Take 1 tablet (1 mg total) by mouth once daily.    metoprolol tartrate (LOPRESSOR) 50 MG tablet Take 1 tablet (50 mg total) by mouth 2 (two) times daily. Do not take if heart rate is less than 60    nortriptyline (PAMELOR) 25 MG capsule Take 1 capsule (25 mg total) by mouth every evening.    oxybutynin (DITROPAN) 5 MG Tab Take 1 tablet (5 mg total) by mouth 3 (three) times daily  as needed (bladder spasms (urge to urinate, pain in bladder)).    papaverine 30 mg/mL injection Add phentolamine 10 mgs/ml., add PGE1  100 micrograms.    rifAXIMin (XIFAXAN) 550 mg Tab Take 1 tablet (550 mg total) by mouth 2 (two) times daily.    spironolactone (ALDACTONE) 25 MG tablet Take 1 tablet (25 mg total) by mouth once daily.    suzetrigine 50 mg Tab Take 50 mg by mouth 2 (two) times a day. Take 100 mg for the first dose, 50 mg for subsequent doses.    tadalafiL (CIALIS) 20 MG Tab Take 1 tablet (20 mg total) by mouth daily as needed (erectile dysfunction).    tamsulosin (FLOMAX) 0.4 mg Cap Take 1 capsule (0.4 mg total) by mouth once daily.    thiamine 100 MG tablet Take 1 tablet (100 mg total) by mouth once daily.    traMADoL (ULTRAM) 50 mg tablet Take 1 tablet (50 mg total) by mouth 4 (four) times daily as needed for Pain.     No current facility-administered medications for this visit.       Objective:      NOT DONE-VIDEO VISIT  Assessment:       1. Secondary esophageal varices without bleeding    2. Alcoholic cirrhosis of liver with ascites      Plan:   This 49 year old man has decompensated alcohol related cirrhosis with jaundice and mild ascites. His MELD score despite abstinence from alcohol was 20 in Feb 2025 driven by his bilirubin and his chronically elevated creatinine.    He feels well but is troubled by abdominal bloating and leg edema - his latest abdo US in June 2025 showed only a trace of ascites.    - on small dose of sharifa only and has some urination problems related to his prostate.    Once again, I explained to him that his liver function was severely impaired but can recover with prolonged abstinence. Explained that he should aim for lifelong complete abstinence.    - Left hip fracture 4 years ago with poor healing and dificulty walking  - Walker around the house  - wheelchair for distances    I had previously wanted him to start a liver Tx evaluation but since he has improved, we will  defer this until I see him back in clinic in 3 months.    1) Start furosemide 20 mg daily  2) Repeat labs now  3) Clinic in 4 months.    He asked me if anyone would be willing to consider him for a hip replacement and I think it may be worthwhile getting an orthopedic opinion since his liver is now better (although he still has evidence of portal hypertension).

## 2025-07-21 NOTE — TELEPHONE ENCOUNTER
Appointment scheduled, acceptance verbalized.     Copied from CRM #[id number]. Topic: General Inquiry - Patient Advice  >> Jul 21, 2025 12:26 PM Libby Nieves wrote:  Type: Advice    Who Called: Patient     Would the patient rather a call back or a response via [facility]? Call Back    Best Call Back Number: [phone]    Additional Information: Pt is calling asking for a return call from office in regards to getting information about getting a hip replacement per pt.

## 2025-07-22 DIAGNOSIS — R52 PAIN: Primary | ICD-10-CM

## 2025-07-24 DIAGNOSIS — M87.052 AVASCULAR NECROSIS OF HIP, LEFT: ICD-10-CM

## 2025-07-24 DIAGNOSIS — F11.90 CHRONIC, CONTINUOUS USE OF OPIOIDS: ICD-10-CM

## 2025-07-24 DIAGNOSIS — G89.4 CHRONIC PAIN SYNDROME: ICD-10-CM

## 2025-07-24 RX ORDER — TRAMADOL HYDROCHLORIDE 50 MG/1
50 TABLET, FILM COATED ORAL 4 TIMES DAILY PRN
Qty: 120 TABLET | Refills: 2 | Status: SHIPPED | OUTPATIENT
Start: 2025-07-25 | End: 2025-10-23

## 2025-07-25 ENCOUNTER — LAB VISIT (OUTPATIENT)
Dept: LAB | Facility: HOSPITAL | Age: 50
End: 2025-07-25
Attending: INTERNAL MEDICINE
Payer: MEDICARE

## 2025-07-25 DIAGNOSIS — K70.31 ALCOHOLIC CIRRHOSIS OF LIVER WITH ASCITES: ICD-10-CM

## 2025-07-25 LAB
ABSOLUTE EOSINOPHIL (SMH): 0.23 K/UL
ABSOLUTE MONOCYTE (SMH): 0.51 K/UL (ref 0.3–1)
ABSOLUTE NEUTROPHIL COUNT (SMH): 3.5 K/UL (ref 1.8–7.7)
ALBUMIN SERPL-MCNC: 3.1 G/DL (ref 3.5–5.2)
ALP SERPL-CCNC: 82 UNIT/L (ref 55–135)
ALT SERPL-CCNC: 19 UNIT/L (ref 10–44)
ANION GAP (SMH): 7 MMOL/L (ref 8–16)
AST SERPL-CCNC: 34 UNIT/L (ref 10–40)
BASOPHILS # BLD AUTO: 0.03 K/UL
BASOPHILS NFR BLD AUTO: 0.5 %
BILIRUB SERPL-MCNC: 2.8 MG/DL (ref 0.1–1)
BUN SERPL-MCNC: 21 MG/DL (ref 6–20)
CALCIUM SERPL-MCNC: 8.7 MG/DL (ref 8.7–10.5)
CHLORIDE SERPL-SCNC: 110 MMOL/L (ref 95–110)
CO2 SERPL-SCNC: 19 MMOL/L (ref 23–29)
CREAT SERPL-MCNC: 1.9 MG/DL (ref 0.5–1.4)
ERYTHROCYTE [DISTWIDTH] IN BLOOD BY AUTOMATED COUNT: 15.3 % (ref 11.5–14.5)
GFR SERPLBLD CREATININE-BSD FMLA CKD-EPI: 42 ML/MIN/1.73/M2
GLUCOSE SERPL-MCNC: 98 MG/DL (ref 70–110)
HCT VFR BLD AUTO: 30.2 % (ref 40–54)
HGB BLD-MCNC: 10.2 GM/DL (ref 14–18)
IMM GRANULOCYTES # BLD AUTO: 0.02 K/UL (ref 0–0.04)
IMM GRANULOCYTES NFR BLD AUTO: 0.4 % (ref 0–0.5)
INR PPP: 1.2 (ref 0.8–1.2)
LYMPHOCYTES # BLD AUTO: 1.27 K/UL (ref 1–4.8)
MCH RBC QN AUTO: 32.9 PG (ref 27–31)
MCHC RBC AUTO-ENTMCNC: 33.8 G/DL (ref 32–36)
MCV RBC AUTO: 97 FL (ref 82–98)
NUCLEATED RBC (/100WBC) (SMH): 0 /100 WBC
PLATELET # BLD AUTO: 73 K/UL (ref 150–450)
PMV BLD AUTO: 9.8 FL (ref 9.2–12.9)
POTASSIUM SERPL-SCNC: 3.9 MMOL/L (ref 3.5–5.1)
PROT SERPL-MCNC: 7.3 GM/DL (ref 6–8.4)
PROTHROMBIN TIME: 13.5 SECONDS (ref 9–12.5)
RBC # BLD AUTO: 3.1 M/UL (ref 4.6–6.2)
RELATIVE EOSINOPHIL (SMH): 4.1 % (ref 0–8)
RELATIVE LYMPHOCYTE (SMH): 22.7 % (ref 18–48)
RELATIVE MONOCYTE (SMH): 9.1 % (ref 4–15)
RELATIVE NEUTROPHIL (SMH): 63.2 % (ref 38–73)
SODIUM SERPL-SCNC: 136 MMOL/L (ref 136–145)
WBC # BLD AUTO: 5.6 K/UL (ref 3.9–12.7)

## 2025-07-25 PROCEDURE — 85610 PROTHROMBIN TIME: CPT

## 2025-07-25 PROCEDURE — 80053 COMPREHEN METABOLIC PANEL: CPT

## 2025-07-25 PROCEDURE — 36415 COLL VENOUS BLD VENIPUNCTURE: CPT

## 2025-07-25 PROCEDURE — 82105 ALPHA-FETOPROTEIN SERUM: CPT

## 2025-07-25 PROCEDURE — 85025 COMPLETE CBC W/AUTO DIFF WBC: CPT

## 2025-07-26 LAB — AFP-TM SERPL-MCNC: 1.9 NG/ML (ref 0–6.9)

## 2025-07-29 ENCOUNTER — PATIENT MESSAGE (OUTPATIENT)
Dept: ADMINISTRATIVE | Facility: HOSPITAL | Age: 50
End: 2025-07-29
Payer: MEDICARE

## 2025-07-30 ENCOUNTER — PATIENT OUTREACH (OUTPATIENT)
Dept: ADMINISTRATIVE | Facility: HOSPITAL | Age: 50
End: 2025-07-30
Payer: MEDICARE

## 2025-07-30 VITALS — DIASTOLIC BLOOD PRESSURE: 83 MMHG | SYSTOLIC BLOOD PRESSURE: 139 MMHG

## 2025-07-30 NOTE — NURSING
Medication:   Requested Prescriptions     Pending Prescriptions Disp Refills    lisdexamfetamine (VYVANSE) 70 MG capsule 30 capsule 0     Sig: Take 1 capsule by mouth every morning for 30 days.    BYSTOLIC 10 MG tablet 180 tablet 1     Sig: Take 1 tablet by mouth daily       Last Filled:  lisdexamfetamine (VYVANSE) 70 MG capsule () 25      BYSTOLIC 10 MG tablet   25    Patient Phone Number: [phone] (home)     Last appt: 2025   Next appt: Visit date not found    Last Labs DM:   Lab Results   Component Value Date/Time    LABA1C 5.6 2024 11:16 AM     Last Lipid:   Lab Results   Component Value Date/Time    CHOL 229 2024 11:16 AM    TRIG 75 2024 11:16 AM    HDL 48 2024 11:16 AM     Last PSA:   Lab Results   Component Value Date/Time    PSA 1.0 2021 11:14 AM     Last Thyroid:   Lab Results   Component Value Date/Time    TSH 2.75 10/16/2018 09:06 AM    FT3 3.5 2021 11:14 AM    T4FREE 1.31 2012 02:27 PM    T4FREE 1.31 2012 02:27 PM            Pt resting. No c/o SOB or pain. No complaints at this time.

## 2025-07-30 NOTE — PROGRESS NOTES
Care Coordination Encounter Details:       MyChart Portal Status:         [x]  Reviewed MyChart Portal Status offered / enrolled if applicable        Additional Notes:     MyChart Outcomes: Pt is enrolled & active          Updates Requested / Reviewed:        Updated Care Coordination Note, Care Everywhere, , Care Team Updated, and Immunizations Reconciliation Completed or Queried: [place]         Health Maintenance Screening(s) Due:      Health Maintenance Topics Overdue:      VBHM Score: 1     Uncontrolled BP                 Health Maintenance Topic(s) Outreach Outcomes & Actions Taken:    Blood Pressure - Outreach Outcomes & Actions Taken  : Remote Blood Pressure Reading Captured. Patient seen by Urology on 6/30/2025. BP updated. No appointment needed with PCP at this time         Chronic Disease Management:     Diabetes Measures        Lab Results   Component Value Date    HGBA1C 4.8 06/12/2024           [x]  Reviewed chart for active Diabetes diagnosis     []  Scheduled necessary follow up appointments if needed         Additional Notes:  Patient is not diabetic            Hypertension Measures        BP Readings from Last 1 Encounters:   06/30/25 139/83           [x]  Reviewed chart for active Hypertension diagnosis     []  Reviewed & documented Home BP Cuff     []  Documented a Remote BP if needed & applicable     []  Scheduled necessary follow up appointments with Primary Care if needed          Provider Team Continuity:     Last PCP Visit Date: 6/10/2025          [x]  Reviewed Primary Care Provider Visits, Annual Wellness Visit, and Future          Appointments to ensure appointments have been scheduled and/or           completed        Additional Notes:  PCP recommends 1 year F/U, 6/10/2026 for annual            Social Determinants of Health          [x]  Reviewed, completed, and/or updated the following sections:                  Food Insecurity, Transportation Needs, Financial Resource  Strain,                 Tobacco Use        Additional Notes:  UTD            Care Management, Digital Medicine, and/or Education Referrals    OPCM Risk Score: 81.9         Next Steps - Referral Actions: Patient is eligible for DM

## 2025-08-07 ENCOUNTER — TELEPHONE (OUTPATIENT)
Dept: PRIMARY CARE CLINIC | Facility: CLINIC | Age: 50
End: 2025-08-07
Payer: MEDICARE

## 2025-08-07 DIAGNOSIS — L40.9 PSORIASIS: Primary | ICD-10-CM

## 2025-08-07 RX ORDER — TRIAMCINOLONE ACETONIDE 1 MG/G
CREAM TOPICAL 2 TIMES DAILY
Qty: 80 G | Refills: 1 | Status: SHIPPED | OUTPATIENT
Start: 2025-08-07

## 2025-08-07 NOTE — TELEPHONE ENCOUNTER
Patient is requesting to see if he can get a prescription for his psoriasis. He states Dr zarate prescribed something in the past.

## 2025-08-13 ENCOUNTER — TELEPHONE (OUTPATIENT)
Dept: ORTHOPEDICS | Facility: CLINIC | Age: 50
End: 2025-08-13
Payer: MEDICARE

## 2025-08-13 DIAGNOSIS — M25.552 LEFT HIP PAIN: Primary | ICD-10-CM

## 2025-08-14 ENCOUNTER — TELEPHONE (OUTPATIENT)
Dept: PAIN MEDICINE | Facility: CLINIC | Age: 50
End: 2025-08-14

## 2025-08-14 ENCOUNTER — OFFICE VISIT (OUTPATIENT)
Dept: ORTHOPEDICS | Facility: CLINIC | Age: 50
End: 2025-08-14
Payer: MEDICARE

## 2025-08-14 ENCOUNTER — HOSPITAL ENCOUNTER (OUTPATIENT)
Dept: RADIOLOGY | Facility: HOSPITAL | Age: 50
Discharge: HOME | End: 2025-08-14
Attending: STUDENT IN AN ORGANIZED HEALTH CARE EDUCATION/TRAINING PROGRAM
Payer: MEDICARE

## 2025-08-14 ENCOUNTER — TELEPHONE (OUTPATIENT)
Dept: ORTHOPEDICS | Facility: CLINIC | Age: 50
End: 2025-08-14
Payer: MEDICARE

## 2025-08-14 ENCOUNTER — OFFICE VISIT (OUTPATIENT)
Dept: PAIN MEDICINE | Facility: CLINIC | Age: 50
End: 2025-08-14
Payer: MEDICARE

## 2025-08-14 VITALS
DIASTOLIC BLOOD PRESSURE: 82 MMHG | HEART RATE: 91 BPM | WEIGHT: 260.13 LBS | HEIGHT: 72 IN | SYSTOLIC BLOOD PRESSURE: 154 MMHG | BODY MASS INDEX: 35.23 KG/M2

## 2025-08-14 VITALS — HEIGHT: 72 IN | BODY MASS INDEX: 34.7 KG/M2 | WEIGHT: 256.19 LBS

## 2025-08-14 DIAGNOSIS — M16.11 PRIMARY OSTEOARTHRITIS OF RIGHT HIP: ICD-10-CM

## 2025-08-14 DIAGNOSIS — R52 PAIN: Primary | ICD-10-CM

## 2025-08-14 DIAGNOSIS — G89.4 CHRONIC PAIN SYNDROME: ICD-10-CM

## 2025-08-14 DIAGNOSIS — M87.052 AVASCULAR NECROSIS OF BONE OF HIP, LEFT: Primary | ICD-10-CM

## 2025-08-14 DIAGNOSIS — R52 PAIN: ICD-10-CM

## 2025-08-14 DIAGNOSIS — M25.562 CHRONIC PAIN OF LEFT KNEE: Primary | ICD-10-CM

## 2025-08-14 DIAGNOSIS — G89.29 CHRONIC PAIN OF LEFT KNEE: Primary | ICD-10-CM

## 2025-08-14 DIAGNOSIS — M16.7 OTHER SECONDARY OSTEOARTHRITIS OF LEFT HIP: ICD-10-CM

## 2025-08-14 DIAGNOSIS — M17.12 PRIMARY OSTEOARTHRITIS OF LEFT KNEE: ICD-10-CM

## 2025-08-14 DIAGNOSIS — F11.20 OPIOID DEPENDENCE, UNCOMPLICATED: ICD-10-CM

## 2025-08-14 DIAGNOSIS — M87.052 AVASCULAR NECROSIS OF HIP, LEFT: ICD-10-CM

## 2025-08-14 DIAGNOSIS — M87.051 AVASCULAR NECROSIS OF BONE OF RIGHT HIP: ICD-10-CM

## 2025-08-14 DIAGNOSIS — F11.90 CHRONIC, CONTINUOUS USE OF OPIOIDS: ICD-10-CM

## 2025-08-14 DIAGNOSIS — M25.552 LEFT HIP PAIN: ICD-10-CM

## 2025-08-14 PROCEDURE — 73521 X-RAY EXAM HIPS BI 2 VIEWS: CPT | Mod: 26,,, | Performed by: RADIOLOGY

## 2025-08-14 PROCEDURE — 99213 OFFICE O/P EST LOW 20 MIN: CPT | Mod: PBBFAC,25,27 | Performed by: STUDENT IN AN ORGANIZED HEALTH CARE EDUCATION/TRAINING PROGRAM

## 2025-08-14 PROCEDURE — 99204 OFFICE O/P NEW MOD 45 MIN: CPT | Mod: S$PBB,,, | Performed by: STUDENT IN AN ORGANIZED HEALTH CARE EDUCATION/TRAINING PROGRAM

## 2025-08-14 PROCEDURE — 73521 X-RAY EXAM HIPS BI 2 VIEWS: CPT | Mod: TC

## 2025-08-14 PROCEDURE — 99999 PR PBB SHADOW E&M-EST. PATIENT-LVL III: CPT | Mod: PBBFAC,,, | Performed by: STUDENT IN AN ORGANIZED HEALTH CARE EDUCATION/TRAINING PROGRAM

## 2025-08-14 PROCEDURE — 99214 OFFICE O/P EST MOD 30 MIN: CPT | Mod: S$PBB,,, | Performed by: STUDENT IN AN ORGANIZED HEALTH CARE EDUCATION/TRAINING PROGRAM

## 2025-08-14 PROCEDURE — 99213 OFFICE O/P EST LOW 20 MIN: CPT | Mod: PBBFAC,25 | Performed by: STUDENT IN AN ORGANIZED HEALTH CARE EDUCATION/TRAINING PROGRAM

## 2025-08-15 ENCOUNTER — TELEPHONE (OUTPATIENT)
Dept: HEPATOLOGY | Facility: CLINIC | Age: 50
End: 2025-08-15
Payer: MEDICARE

## 2025-08-15 ENCOUNTER — PATIENT MESSAGE (OUTPATIENT)
Dept: HEPATOLOGY | Facility: CLINIC | Age: 50
End: 2025-08-15
Payer: MEDICARE

## 2025-08-15 ENCOUNTER — TELEPHONE (OUTPATIENT)
Dept: PAIN MEDICINE | Facility: CLINIC | Age: 50
End: 2025-08-15
Payer: MEDICARE

## 2025-08-15 DIAGNOSIS — G89.29 CHRONIC PAIN OF LEFT KNEE: Primary | ICD-10-CM

## 2025-08-15 DIAGNOSIS — M25.562 CHRONIC PAIN OF LEFT KNEE: Primary | ICD-10-CM

## 2025-08-25 ENCOUNTER — TELEPHONE (OUTPATIENT)
Dept: PAIN MEDICINE | Facility: CLINIC | Age: 50
End: 2025-08-25
Payer: MEDICARE

## 2025-09-02 ENCOUNTER — OFFICE VISIT (OUTPATIENT)
Dept: UROLOGY | Facility: CLINIC | Age: 50
End: 2025-09-02
Payer: MEDICARE

## 2025-09-02 ENCOUNTER — HOSPITAL ENCOUNTER (OUTPATIENT)
Facility: HOSPITAL | Age: 50
Discharge: HOME | End: 2025-09-02
Attending: STUDENT IN AN ORGANIZED HEALTH CARE EDUCATION/TRAINING PROGRAM | Admitting: STUDENT IN AN ORGANIZED HEALTH CARE EDUCATION/TRAINING PROGRAM
Payer: MEDICARE

## 2025-09-02 VITALS
HEIGHT: 72 IN | BODY MASS INDEX: 34.67 KG/M2 | RESPIRATION RATE: 16 BRPM | WEIGHT: 256 LBS | HEART RATE: 76 BPM | SYSTOLIC BLOOD PRESSURE: 133 MMHG | OXYGEN SATURATION: 100 % | TEMPERATURE: 98 F | DIASTOLIC BLOOD PRESSURE: 60 MMHG

## 2025-09-02 DIAGNOSIS — M25.562 CHRONIC PAIN OF LEFT KNEE: Primary | ICD-10-CM

## 2025-09-02 DIAGNOSIS — G89.29 CHRONIC PAIN OF LEFT KNEE: Primary | ICD-10-CM

## 2025-09-02 DIAGNOSIS — M25.569 CHRONIC KNEE PAIN: ICD-10-CM

## 2025-09-02 DIAGNOSIS — N39.43 POST-VOID DRIBBLING: ICD-10-CM

## 2025-09-02 DIAGNOSIS — N52.9 ERECTILE DYSFUNCTION, UNSPECIFIED ERECTILE DYSFUNCTION TYPE: ICD-10-CM

## 2025-09-02 DIAGNOSIS — G89.29 CHRONIC KNEE PAIN: ICD-10-CM

## 2025-09-02 DIAGNOSIS — N32.81 OVERACTIVE BLADDER: Primary | ICD-10-CM

## 2025-09-02 PROCEDURE — 64454 NJX AA&/STRD GNCLR NRV BRNCH: CPT | Mod: LT,,, | Performed by: STUDENT IN AN ORGANIZED HEALTH CARE EDUCATION/TRAINING PROGRAM

## 2025-09-02 PROCEDURE — 64454 NJX AA&/STRD GNCLR NRV BRNCH: CPT | Mod: LT | Performed by: STUDENT IN AN ORGANIZED HEALTH CARE EDUCATION/TRAINING PROGRAM

## 2025-09-02 PROCEDURE — 63600175 PHARM REV CODE 636 W HCPCS: Performed by: STUDENT IN AN ORGANIZED HEALTH CARE EDUCATION/TRAINING PROGRAM

## 2025-09-02 RX ORDER — LIDOCAINE HYDROCHLORIDE 20 MG/ML
INJECTION, SOLUTION EPIDURAL; INFILTRATION; INTRACAUDAL; PERINEURAL
Status: DISCONTINUED | OUTPATIENT
Start: 2025-09-02 | End: 2025-09-02 | Stop reason: HOSPADM

## 2025-09-02 RX ORDER — BUPIVACAINE HYDROCHLORIDE 2.5 MG/ML
INJECTION, SOLUTION EPIDURAL; INFILTRATION; INTRACAUDAL; PERINEURAL
Status: DISCONTINUED | OUTPATIENT
Start: 2025-09-02 | End: 2025-09-02 | Stop reason: HOSPADM

## 2025-09-02 RX ORDER — PAPAVERINE HYDROCHLORIDE 30 MG/ML
INJECTION INTRAMUSCULAR; INTRAVENOUS
Qty: 10 ML | Refills: 12 | Status: SHIPPED | OUTPATIENT
Start: 2025-09-02

## 2025-09-03 ENCOUNTER — TELEPHONE (OUTPATIENT)
Dept: PAIN MEDICINE | Facility: CLINIC | Age: 50
End: 2025-09-03
Payer: MEDICARE

## (undated) DEVICE — MARKER SKIN STND TIP BLUE BARR

## (undated) DEVICE — NDL QUINCKE S/SU 22GA 5IN

## (undated) DEVICE — GLOVE BIOGEL SKINSENSE PI 7.5

## (undated) DEVICE — BANDAGE ADHESIVE

## (undated) DEVICE — KIT NERVE BLOCK PREP BAPTIST

## (undated) DEVICE — TOWEL OR DISP STRL BLUE 4/PK

## (undated) DEVICE — SEE MEDLINE ITEM 146292